# Patient Record
Sex: FEMALE | Race: WHITE | NOT HISPANIC OR LATINO | Employment: OTHER | URBAN - METROPOLITAN AREA
[De-identification: names, ages, dates, MRNs, and addresses within clinical notes are randomized per-mention and may not be internally consistent; named-entity substitution may affect disease eponyms.]

---

## 2020-12-21 ENCOUNTER — TELEPHONE (OUTPATIENT)
Dept: FAMILY MEDICINE | Facility: CLINIC | Age: 75
End: 2020-12-21

## 2020-12-21 NOTE — TELEPHONE ENCOUNTER
----- Message from Michaela Vasques sent at 12/21/2020  5:03 PM CST -----  Contact: self [phone]  Caller is requesting an earlier appt than we can schedule.  Caller declined first available appointment listed below. Caller will not accept being placed on the wait list and is requesting a message be sent to the provider.  When is the next available appointment:  2/9/2021  Reason for the appointment:  medication refill  Patient preference of timeframe to be scheduled: first week of January  Comments:

## 2021-01-04 ENCOUNTER — OFFICE VISIT (OUTPATIENT)
Dept: FAMILY MEDICINE | Facility: CLINIC | Age: 76
End: 2021-01-04
Payer: MEDICARE

## 2021-01-04 ENCOUNTER — TELEPHONE (OUTPATIENT)
Dept: FAMILY MEDICINE | Facility: CLINIC | Age: 76
End: 2021-01-04

## 2021-01-04 VITALS
TEMPERATURE: 95 F | OXYGEN SATURATION: 97 % | HEART RATE: 99 BPM | SYSTOLIC BLOOD PRESSURE: 130 MMHG | DIASTOLIC BLOOD PRESSURE: 72 MMHG | HEIGHT: 61 IN | BODY MASS INDEX: 33.68 KG/M2 | WEIGHT: 178.38 LBS

## 2021-01-04 DIAGNOSIS — Z12.11 SCREENING FOR COLON CANCER: ICD-10-CM

## 2021-01-04 DIAGNOSIS — G89.4 CHRONIC PAIN ASSOCIATED WITH SIGNIFICANT PSYCHOSOCIAL DYSFUNCTION: ICD-10-CM

## 2021-01-04 DIAGNOSIS — I10 HYPERTENSION, UNSPECIFIED TYPE: ICD-10-CM

## 2021-01-04 DIAGNOSIS — I10 ESSENTIAL HYPERTENSION: Primary | ICD-10-CM

## 2021-01-04 DIAGNOSIS — K21.9 GASTROESOPHAGEAL REFLUX DISEASE WITHOUT ESOPHAGITIS: ICD-10-CM

## 2021-01-04 DIAGNOSIS — K21.9 GASTROESOPHAGEAL REFLUX DISEASE, UNSPECIFIED WHETHER ESOPHAGITIS PRESENT: ICD-10-CM

## 2021-01-04 PROBLEM — M54.16 LUMBAR RADICULOPATHY: Status: ACTIVE | Noted: 2021-01-04

## 2021-01-04 PROBLEM — C53.9: Status: ACTIVE | Noted: 2018-01-11

## 2021-01-04 PROBLEM — F17.200 NICOTINE DEPENDENCE: Status: RESOLVED | Noted: 2018-01-11 | Resolved: 2021-01-04

## 2021-01-04 PROBLEM — L93.0 LUPUS ERYTHEMATOSUS: Status: ACTIVE | Noted: 2018-01-11

## 2021-01-04 PROBLEM — F17.200 NICOTINE DEPENDENCE: Status: ACTIVE | Noted: 2018-01-11

## 2021-01-04 PROBLEM — J41.1 MUCOPURULENT CHRONIC BRONCHITIS: Status: ACTIVE | Noted: 2021-01-04

## 2021-01-04 PROBLEM — E11.42 DIABETIC POLYNEUROPATHY: Status: ACTIVE | Noted: 2019-03-12

## 2021-01-04 PROBLEM — Z87.442 HISTORY OF RENAL CALCULI: Status: ACTIVE | Noted: 2021-01-04

## 2021-01-04 PROCEDURE — 99204 OFFICE O/P NEW MOD 45 MIN: CPT | Mod: S$GLB,,, | Performed by: STUDENT IN AN ORGANIZED HEALTH CARE EDUCATION/TRAINING PROGRAM

## 2021-01-04 PROCEDURE — 99204 PR OFFICE/OUTPT VISIT, NEW, LEVL IV, 45-59 MIN: ICD-10-PCS | Mod: S$GLB,,, | Performed by: STUDENT IN AN ORGANIZED HEALTH CARE EDUCATION/TRAINING PROGRAM

## 2021-01-04 RX ORDER — PROMETHAZINE HYDROCHLORIDE 25 MG/1
TABLET ORAL
COMMUNITY
Start: 2020-12-01 | End: 2021-07-19 | Stop reason: SDUPTHER

## 2021-01-04 RX ORDER — HYDROCODONE BITARTRATE AND ACETAMINOPHEN 10; 325 MG/1; MG/1
1 TABLET ORAL 4 TIMES DAILY
Qty: 120 TABLET | Refills: 0 | OUTPATIENT
Start: 2021-01-04

## 2021-01-04 RX ORDER — LOPERAMIDE HYDROCHLORIDE 2 MG/1
CAPSULE ORAL
Status: ON HOLD | COMMUNITY
End: 2021-09-10 | Stop reason: HOSPADM

## 2021-01-04 RX ORDER — INSULIN DETEMIR 100 [IU]/ML
INJECTION, SOLUTION SUBCUTANEOUS
COMMUNITY
Start: 2020-12-01 | End: 2021-06-16

## 2021-01-04 RX ORDER — DICYCLOMINE HYDROCHLORIDE 10 MG/1
CAPSULE ORAL
COMMUNITY
End: 2021-08-03 | Stop reason: SDUPTHER

## 2021-01-04 RX ORDER — PANTOPRAZOLE SODIUM 40 MG/1
TABLET, DELAYED RELEASE ORAL
Qty: 90 TABLET | Refills: 0 | Status: ON HOLD | OUTPATIENT
Start: 2021-01-04 | End: 2021-09-10 | Stop reason: HOSPADM

## 2021-01-04 RX ORDER — FLUTICASONE PROPIONATE 50 MCG
SPRAY, SUSPENSION (ML) NASAL
Status: ON HOLD | COMMUNITY
End: 2021-09-10 | Stop reason: HOSPADM

## 2021-01-04 RX ORDER — ATORVASTATIN CALCIUM 20 MG/1
TABLET, FILM COATED ORAL
COMMUNITY
Start: 2020-10-30 | End: 2021-05-28 | Stop reason: SDUPTHER

## 2021-01-04 RX ORDER — INSULIN ASPART 100 [IU]/ML
INJECTION, SUSPENSION SUBCUTANEOUS
COMMUNITY
End: 2021-06-16

## 2021-01-04 RX ORDER — CALCIUM CITRATE/VITAMIN D3 200MG-6.25
TABLET ORAL
COMMUNITY
Start: 2020-09-29 | End: 2021-02-09 | Stop reason: SDUPTHER

## 2021-01-04 RX ORDER — OXYCODONE AND ACETAMINOPHEN 7.5; 325 MG/1; MG/1
TABLET ORAL
COMMUNITY
End: 2021-02-09

## 2021-01-04 RX ORDER — PIOGLITAZONEHYDROCHLORIDE 15 MG/1
TABLET ORAL
COMMUNITY
End: 2021-01-04

## 2021-01-04 RX ORDER — CLONIDINE HYDROCHLORIDE 0.1 MG/1
0.1 TABLET ORAL 2 TIMES DAILY
Qty: 90 TABLET | Refills: 0 | Status: SHIPPED | OUTPATIENT
Start: 2021-01-04 | End: 2021-03-11

## 2021-01-04 RX ORDER — METHOCARBAMOL 750 MG/1
TABLET, FILM COATED ORAL
COMMUNITY
End: 2021-08-03 | Stop reason: SDUPTHER

## 2021-01-04 RX ORDER — AMLODIPINE BESYLATE 5 MG/1
TABLET ORAL
COMMUNITY
End: 2021-08-03 | Stop reason: SDUPTHER

## 2021-01-04 RX ORDER — PANTOPRAZOLE SODIUM 40 MG/1
TABLET, DELAYED RELEASE ORAL
COMMUNITY
End: 2021-01-04 | Stop reason: SDUPTHER

## 2021-01-04 RX ORDER — FUROSEMIDE 20 MG/1
TABLET ORAL
COMMUNITY
End: 2021-08-03 | Stop reason: SDUPTHER

## 2021-01-04 RX ORDER — IPRATROPIUM BROMIDE AND ALBUTEROL 20; 100 UG/1; UG/1
SPRAY, METERED RESPIRATORY (INHALATION)
COMMUNITY
End: 2021-02-10 | Stop reason: SDUPTHER

## 2021-01-06 RX ORDER — HYDROCODONE BITARTRATE AND ACETAMINOPHEN 10; 325 MG/1; MG/1
1 TABLET ORAL EVERY 6 HOURS PRN
Qty: 120 TABLET | Refills: 0 | OUTPATIENT
Start: 2021-01-06

## 2021-01-08 RX ORDER — HYDROCODONE BITARTRATE AND ACETAMINOPHEN 10; 325 MG/1; MG/1
1 TABLET ORAL 4 TIMES DAILY
Qty: 120 TABLET | Refills: 0 | Status: CANCELLED | OUTPATIENT
Start: 2021-01-08

## 2021-01-28 ENCOUNTER — TELEPHONE (OUTPATIENT)
Dept: FAMILY MEDICINE | Facility: CLINIC | Age: 76
End: 2021-01-28

## 2021-02-09 ENCOUNTER — OFFICE VISIT (OUTPATIENT)
Dept: FAMILY MEDICINE | Facility: CLINIC | Age: 76
End: 2021-02-09
Payer: MEDICARE

## 2021-02-09 DIAGNOSIS — L57.0 ACTINIC KERATOSIS: ICD-10-CM

## 2021-02-09 DIAGNOSIS — E11.42 DIABETIC POLYNEUROPATHY ASSOCIATED WITH TYPE 2 DIABETES MELLITUS: ICD-10-CM

## 2021-02-09 DIAGNOSIS — M51.36 DEGENERATION OF INTERVERTEBRAL DISC OF LUMBAR REGION: Primary | ICD-10-CM

## 2021-02-09 PROCEDURE — 99213 OFFICE O/P EST LOW 20 MIN: CPT | Mod: S$GLB,,, | Performed by: FAMILY MEDICINE

## 2021-02-09 PROCEDURE — 99213 PR OFFICE/OUTPT VISIT, EST, LEVL III, 20-29 MIN: ICD-10-PCS | Mod: S$GLB,,, | Performed by: FAMILY MEDICINE

## 2021-02-09 RX ORDER — HYDROCODONE BITARTRATE AND ACETAMINOPHEN 10; 325 MG/1; MG/1
1 TABLET ORAL 4 TIMES DAILY
Qty: 120 TABLET | Refills: 0 | Status: SHIPPED | OUTPATIENT
Start: 2021-02-09 | End: 2021-02-09

## 2021-02-09 RX ORDER — LIDOCAINE AND PRILOCAINE 25; 25 MG/G; MG/G
CREAM TOPICAL
Status: ON HOLD | COMMUNITY
End: 2021-09-10 | Stop reason: HOSPADM

## 2021-02-09 RX ORDER — MUPIROCIN 20 MG/G
OINTMENT TOPICAL 2 TIMES DAILY
Qty: 30 G | Refills: 2 | Status: ON HOLD | OUTPATIENT
Start: 2021-02-09 | End: 2021-09-10 | Stop reason: HOSPADM

## 2021-02-09 RX ORDER — MUPIROCIN 20 MG/G
OINTMENT TOPICAL
COMMUNITY
End: 2021-02-09 | Stop reason: SDUPTHER

## 2021-02-09 RX ORDER — CALCIUM CITRATE/VITAMIN D3 200MG-6.25
1 TABLET ORAL 4 TIMES DAILY
Qty: 124 STRIP | Refills: 5 | Status: ON HOLD | OUTPATIENT
Start: 2021-02-09 | End: 2021-09-10 | Stop reason: HOSPADM

## 2021-02-09 RX ORDER — PIOGLITAZONEHYDROCHLORIDE 15 MG/1
TABLET ORAL
COMMUNITY
Start: 2021-02-03 | End: 2021-06-16

## 2021-02-09 RX ORDER — HYDROCODONE BITARTRATE AND ACETAMINOPHEN 10; 325 MG/1; MG/1
1 TABLET ORAL 4 TIMES DAILY
Qty: 120 TABLET | Refills: 0 | Status: SHIPPED | OUTPATIENT
Start: 2021-02-09 | End: 2021-03-15 | Stop reason: SDUPTHER

## 2021-02-10 ENCOUNTER — PATIENT MESSAGE (OUTPATIENT)
Dept: RHEUMATOLOGY | Facility: CLINIC | Age: 76
End: 2021-02-10

## 2021-02-10 RX ORDER — IPRATROPIUM BROMIDE AND ALBUTEROL 20; 100 UG/1; UG/1
SPRAY, METERED RESPIRATORY (INHALATION)
Qty: 1 PACKAGE | Refills: 5 | Status: ON HOLD | OUTPATIENT
Start: 2021-02-10 | End: 2021-09-10 | Stop reason: HOSPADM

## 2021-02-11 DIAGNOSIS — E11.9 TYPE 2 DIABETES MELLITUS WITHOUT COMPLICATION: ICD-10-CM

## 2021-03-10 ENCOUNTER — TELEPHONE (OUTPATIENT)
Dept: PAIN MEDICINE | Facility: CLINIC | Age: 76
End: 2021-03-10

## 2021-03-11 ENCOUNTER — OFFICE VISIT (OUTPATIENT)
Dept: PAIN MEDICINE | Facility: CLINIC | Age: 76
End: 2021-03-11
Attending: ANESTHESIOLOGY
Payer: MEDICARE

## 2021-03-11 VITALS
RESPIRATION RATE: 18 BRPM | DIASTOLIC BLOOD PRESSURE: 79 MMHG | HEIGHT: 61 IN | TEMPERATURE: 99 F | SYSTOLIC BLOOD PRESSURE: 127 MMHG | WEIGHT: 183 LBS | HEART RATE: 113 BPM | BODY MASS INDEX: 34.55 KG/M2

## 2021-03-11 DIAGNOSIS — G89.4 CHRONIC PAIN SYNDROME: ICD-10-CM

## 2021-03-11 DIAGNOSIS — M54.15 RADICULOPATHY OF THORACOLUMBAR REGION: ICD-10-CM

## 2021-03-11 DIAGNOSIS — G89.4 CHRONIC PAIN ASSOCIATED WITH SIGNIFICANT PSYCHOSOCIAL DYSFUNCTION: ICD-10-CM

## 2021-03-11 DIAGNOSIS — E11.42 DIABETIC POLYNEUROPATHY ASSOCIATED WITH TYPE 2 DIABETES MELLITUS: Primary | ICD-10-CM

## 2021-03-11 DIAGNOSIS — I10 HYPERTENSION, UNSPECIFIED TYPE: ICD-10-CM

## 2021-03-11 DIAGNOSIS — M79.7 FIBROMYALGIA: ICD-10-CM

## 2021-03-11 DIAGNOSIS — M51.36 DDD (DEGENERATIVE DISC DISEASE), LUMBAR: ICD-10-CM

## 2021-03-11 PROCEDURE — 99215 OFFICE O/P EST HI 40 MIN: CPT | Mod: PBBFAC | Performed by: ANESTHESIOLOGY

## 2021-03-11 PROCEDURE — 99999 PR PBB SHADOW E&M-EST. PATIENT-LVL V: ICD-10-PCS | Mod: PBBFAC,,, | Performed by: ANESTHESIOLOGY

## 2021-03-11 PROCEDURE — 99204 PR OFFICE/OUTPT VISIT, NEW, LEVL IV, 45-59 MIN: ICD-10-PCS | Mod: S$PBB,GC,, | Performed by: ANESTHESIOLOGY

## 2021-03-11 PROCEDURE — 99999 PR PBB SHADOW E&M-EST. PATIENT-LVL V: CPT | Mod: PBBFAC,,, | Performed by: ANESTHESIOLOGY

## 2021-03-11 PROCEDURE — 99204 OFFICE O/P NEW MOD 45 MIN: CPT | Mod: S$PBB,GC,, | Performed by: ANESTHESIOLOGY

## 2021-03-11 RX ORDER — CLONIDINE HYDROCHLORIDE 0.1 MG/1
0.1 TABLET ORAL 2 TIMES DAILY
Qty: 90 TABLET | Refills: 0 | Status: SHIPPED | OUTPATIENT
Start: 2021-03-11 | End: 2021-05-27

## 2021-03-12 ENCOUNTER — TELEPHONE (OUTPATIENT)
Dept: PAIN MEDICINE | Facility: CLINIC | Age: 76
End: 2021-03-12

## 2021-03-15 ENCOUNTER — TELEPHONE (OUTPATIENT)
Dept: PAIN MEDICINE | Facility: CLINIC | Age: 76
End: 2021-03-15

## 2021-03-15 DIAGNOSIS — M51.36 DEGENERATION OF INTERVERTEBRAL DISC OF LUMBAR REGION: ICD-10-CM

## 2021-03-15 RX ORDER — HYDROCODONE BITARTRATE AND ACETAMINOPHEN 10; 325 MG/1; MG/1
1 TABLET ORAL EVERY 8 HOURS PRN
Qty: 90 TABLET | Refills: 0 | Status: SHIPPED | OUTPATIENT
Start: 2021-03-15 | End: 2021-04-15 | Stop reason: SDUPTHER

## 2021-03-21 PROBLEM — M54.16 LUMBAR RADICULOPATHY: Status: RESOLVED | Noted: 2021-01-04 | Resolved: 2021-03-21

## 2021-03-21 PROBLEM — M54.15 RADICULOPATHY OF THORACOLUMBAR REGION: Status: ACTIVE | Noted: 2021-03-21

## 2021-03-31 ENCOUNTER — TELEPHONE (OUTPATIENT)
Dept: PAIN MEDICINE | Facility: CLINIC | Age: 76
End: 2021-03-31

## 2021-04-05 ENCOUNTER — PATIENT MESSAGE (OUTPATIENT)
Dept: ADMINISTRATIVE | Facility: HOSPITAL | Age: 76
End: 2021-04-05

## 2021-04-07 ENCOUNTER — HOSPITAL ENCOUNTER (OUTPATIENT)
Dept: RADIOLOGY | Facility: OTHER | Age: 76
Discharge: HOME | End: 2021-04-07
Attending: ANESTHESIOLOGY
Payer: MEDICARE

## 2021-04-07 DIAGNOSIS — G89.4 CHRONIC PAIN SYNDROME: ICD-10-CM

## 2021-04-07 DIAGNOSIS — G89.4 CHRONIC PAIN ASSOCIATED WITH SIGNIFICANT PSYCHOSOCIAL DYSFUNCTION: ICD-10-CM

## 2021-04-07 DIAGNOSIS — M79.7 FIBROMYALGIA: ICD-10-CM

## 2021-04-07 DIAGNOSIS — E11.42 DIABETIC POLYNEUROPATHY ASSOCIATED WITH TYPE 2 DIABETES MELLITUS: ICD-10-CM

## 2021-04-07 PROCEDURE — 72110 X-RAY EXAM L-2 SPINE 4/>VWS: CPT | Mod: 26,,, | Performed by: RADIOLOGY

## 2021-04-07 PROCEDURE — 73521 X-RAY EXAM HIPS BI 2 VIEWS: CPT | Mod: TC,FY

## 2021-04-07 PROCEDURE — 72110 X-RAY EXAM L-2 SPINE 4/>VWS: CPT | Mod: TC,FY

## 2021-04-07 PROCEDURE — 73521 X-RAY EXAM HIPS BI 2 VIEWS: CPT | Mod: 26,,, | Performed by: RADIOLOGY

## 2021-04-07 PROCEDURE — 72110 XR LUMBAR SPINE 5 VIEW WITH FLEX AND EXT: ICD-10-PCS | Mod: 26,,, | Performed by: RADIOLOGY

## 2021-04-07 PROCEDURE — 73521 XR HIPS BILATERAL 2 VIEW INCL AP PELVIS: ICD-10-PCS | Mod: 26,,, | Performed by: RADIOLOGY

## 2021-04-12 ENCOUNTER — TELEPHONE (OUTPATIENT)
Dept: PAIN MEDICINE | Facility: CLINIC | Age: 76
End: 2021-04-12

## 2021-04-14 ENCOUNTER — TELEPHONE (OUTPATIENT)
Dept: FAMILY MEDICINE | Facility: CLINIC | Age: 76
End: 2021-04-14

## 2021-04-14 ENCOUNTER — TELEPHONE (OUTPATIENT)
Dept: PAIN MEDICINE | Facility: CLINIC | Age: 76
End: 2021-04-14

## 2021-04-15 ENCOUNTER — OFFICE VISIT (OUTPATIENT)
Dept: PAIN MEDICINE | Facility: CLINIC | Age: 76
End: 2021-04-15
Attending: ANESTHESIOLOGY
Payer: MEDICARE

## 2021-04-15 VITALS
RESPIRATION RATE: 18 BRPM | HEART RATE: 103 BPM | BODY MASS INDEX: 35.8 KG/M2 | TEMPERATURE: 99 F | SYSTOLIC BLOOD PRESSURE: 144 MMHG | DIASTOLIC BLOOD PRESSURE: 85 MMHG | WEIGHT: 189.63 LBS | HEIGHT: 61 IN

## 2021-04-15 DIAGNOSIS — M79.7 FIBROMYALGIA: ICD-10-CM

## 2021-04-15 DIAGNOSIS — M51.36 DDD (DEGENERATIVE DISC DISEASE), LUMBAR: ICD-10-CM

## 2021-04-15 DIAGNOSIS — M51.36 DEGENERATION OF INTERVERTEBRAL DISC OF LUMBAR REGION: ICD-10-CM

## 2021-04-15 DIAGNOSIS — G89.4 CHRONIC PAIN DISORDER: Primary | ICD-10-CM

## 2021-04-15 DIAGNOSIS — E11.42 DIABETIC POLYNEUROPATHY ASSOCIATED WITH TYPE 2 DIABETES MELLITUS: ICD-10-CM

## 2021-04-15 DIAGNOSIS — M54.16 LUMBAR RADICULOPATHY: ICD-10-CM

## 2021-04-15 PROCEDURE — 99214 PR OFFICE/OUTPT VISIT, EST, LEVL IV, 30-39 MIN: ICD-10-PCS | Mod: S$PBB,GC,, | Performed by: ANESTHESIOLOGY

## 2021-04-15 PROCEDURE — 99999 PR PBB SHADOW E&M-EST. PATIENT-LVL IV: CPT | Mod: PBBFAC,,, | Performed by: ANESTHESIOLOGY

## 2021-04-15 PROCEDURE — 99999 PR PBB SHADOW E&M-EST. PATIENT-LVL IV: ICD-10-PCS | Mod: PBBFAC,,, | Performed by: ANESTHESIOLOGY

## 2021-04-15 PROCEDURE — 99214 OFFICE O/P EST MOD 30 MIN: CPT | Mod: S$PBB,GC,, | Performed by: ANESTHESIOLOGY

## 2021-04-15 PROCEDURE — 99214 OFFICE O/P EST MOD 30 MIN: CPT | Mod: PBBFAC | Performed by: ANESTHESIOLOGY

## 2021-04-15 RX ORDER — PREGABALIN 150 MG/1
150 CAPSULE ORAL 3 TIMES DAILY
Qty: 90 CAPSULE | Refills: 2 | Status: SHIPPED | OUTPATIENT
Start: 2021-04-15 | End: 2021-08-03 | Stop reason: SDUPTHER

## 2021-04-15 RX ORDER — HYDROCODONE BITARTRATE AND ACETAMINOPHEN 10; 325 MG/1; MG/1
1 TABLET ORAL EVERY 8 HOURS PRN
Qty: 90 TABLET | Refills: 0 | Status: SHIPPED | OUTPATIENT
Start: 2021-04-15 | End: 2021-05-11 | Stop reason: SDUPTHER

## 2021-04-19 ENCOUNTER — TELEPHONE (OUTPATIENT)
Dept: ADMINISTRATIVE | Facility: OTHER | Age: 76
End: 2021-04-19

## 2021-04-20 RX ORDER — PREGABALIN 150 MG/1
150 CAPSULE ORAL 3 TIMES DAILY
OUTPATIENT
Start: 2021-04-20

## 2021-04-21 ENCOUNTER — TELEPHONE (OUTPATIENT)
Dept: FAMILY MEDICINE | Facility: CLINIC | Age: 76
End: 2021-04-21

## 2021-05-04 ENCOUNTER — PATIENT MESSAGE (OUTPATIENT)
Dept: PAIN MEDICINE | Facility: OTHER | Age: 76
End: 2021-05-04

## 2021-05-05 ENCOUNTER — HOSPITAL ENCOUNTER (OUTPATIENT)
Facility: OTHER | Age: 76
Discharge: HOME | End: 2021-05-05
Attending: ANESTHESIOLOGY | Admitting: ANESTHESIOLOGY
Payer: MEDICARE

## 2021-05-05 VITALS
HEART RATE: 102 BPM | BODY MASS INDEX: 31.24 KG/M2 | HEIGHT: 64 IN | SYSTOLIC BLOOD PRESSURE: 129 MMHG | DIASTOLIC BLOOD PRESSURE: 71 MMHG | OXYGEN SATURATION: 95 % | WEIGHT: 183 LBS | RESPIRATION RATE: 16 BRPM | TEMPERATURE: 98 F

## 2021-05-05 DIAGNOSIS — M79.2 NEUROPATHIC PAIN: Primary | ICD-10-CM

## 2021-05-05 LAB — POCT GLUCOSE: 194 MG/DL (ref 70–110)

## 2021-05-05 PROCEDURE — 64520 PR INJECT NERV BLCK,PARAVERT SYMPATH: ICD-10-PCS | Mod: 50,,, | Performed by: ANESTHESIOLOGY

## 2021-05-05 PROCEDURE — 25000003 PHARM REV CODE 250: Performed by: STUDENT IN AN ORGANIZED HEALTH CARE EDUCATION/TRAINING PROGRAM

## 2021-05-05 PROCEDURE — 25000003 PHARM REV CODE 250: Performed by: ANESTHESIOLOGY

## 2021-05-05 PROCEDURE — 25500020 PHARM REV CODE 255: Performed by: ANESTHESIOLOGY

## 2021-05-05 PROCEDURE — 64520 N BLOCK LUMBAR/THORACIC: CPT | Mod: 50,,, | Performed by: ANESTHESIOLOGY

## 2021-05-05 PROCEDURE — 63600175 PHARM REV CODE 636 W HCPCS: Performed by: ANESTHESIOLOGY

## 2021-05-05 PROCEDURE — 64520 N BLOCK LUMBAR/THORACIC: CPT | Mod: 50 | Performed by: ANESTHESIOLOGY

## 2021-05-05 RX ORDER — BUPIVACAINE HYDROCHLORIDE 2.5 MG/ML
INJECTION, SOLUTION EPIDURAL; INFILTRATION; INTRACAUDAL
Status: DISCONTINUED | OUTPATIENT
Start: 2021-05-05 | End: 2021-05-05 | Stop reason: HOSPADM

## 2021-05-05 RX ORDER — DEXAMETHASONE SODIUM PHOSPHATE 4 MG/ML
INJECTION, SOLUTION INTRA-ARTICULAR; INTRALESIONAL; INTRAMUSCULAR; INTRAVENOUS; SOFT TISSUE
Status: DISCONTINUED | OUTPATIENT
Start: 2021-05-05 | End: 2021-05-05 | Stop reason: HOSPADM

## 2021-05-05 RX ORDER — SODIUM CHLORIDE 9 MG/ML
INJECTION, SOLUTION INTRAVENOUS CONTINUOUS
Status: DISCONTINUED | OUTPATIENT
Start: 2021-05-05 | End: 2021-05-05 | Stop reason: HOSPADM

## 2021-05-05 RX ORDER — MIDAZOLAM HYDROCHLORIDE 1 MG/ML
INJECTION INTRAMUSCULAR; INTRAVENOUS
Status: DISCONTINUED | OUTPATIENT
Start: 2021-05-05 | End: 2021-05-05 | Stop reason: HOSPADM

## 2021-05-05 RX ORDER — LIDOCAINE HYDROCHLORIDE 10 MG/ML
INJECTION INFILTRATION; PERINEURAL
Status: DISCONTINUED | OUTPATIENT
Start: 2021-05-05 | End: 2021-05-05 | Stop reason: HOSPADM

## 2021-05-05 RX ORDER — FENTANYL CITRATE 50 UG/ML
INJECTION, SOLUTION INTRAMUSCULAR; INTRAVENOUS
Status: DISCONTINUED | OUTPATIENT
Start: 2021-05-05 | End: 2021-05-05 | Stop reason: HOSPADM

## 2021-05-10 ENCOUNTER — TELEPHONE (OUTPATIENT)
Dept: PAIN MEDICINE | Facility: CLINIC | Age: 76
End: 2021-05-10

## 2021-05-11 DIAGNOSIS — M51.36 DEGENERATION OF INTERVERTEBRAL DISC OF LUMBAR REGION: ICD-10-CM

## 2021-05-12 RX ORDER — HYDROCODONE BITARTRATE AND ACETAMINOPHEN 10; 325 MG/1; MG/1
1 TABLET ORAL EVERY 8 HOURS PRN
Qty: 90 TABLET | Refills: 0 | Status: SHIPPED | OUTPATIENT
Start: 2021-05-14 | End: 2021-05-14 | Stop reason: SDUPTHER

## 2021-05-13 DIAGNOSIS — K21.9 GASTROESOPHAGEAL REFLUX DISEASE, UNSPECIFIED WHETHER ESOPHAGITIS PRESENT: Primary | ICD-10-CM

## 2021-05-13 DIAGNOSIS — I10 HYPERTENSION, UNSPECIFIED TYPE: ICD-10-CM

## 2021-05-14 ENCOUNTER — TELEPHONE (OUTPATIENT)
Dept: PAIN MEDICINE | Facility: CLINIC | Age: 76
End: 2021-05-14

## 2021-05-14 DIAGNOSIS — M51.36 DEGENERATION OF INTERVERTEBRAL DISC OF LUMBAR REGION: ICD-10-CM

## 2021-05-17 RX ORDER — HYDROCODONE BITARTRATE AND ACETAMINOPHEN 10; 325 MG/1; MG/1
1 TABLET ORAL EVERY 8 HOURS PRN
Qty: 90 TABLET | Refills: 0 | Status: SHIPPED | OUTPATIENT
Start: 2021-05-17 | End: 2021-06-10 | Stop reason: SDUPTHER

## 2021-05-26 RX ORDER — CLONIDINE HYDROCHLORIDE 0.1 MG/1
0.1 TABLET ORAL 2 TIMES DAILY
Qty: 90 TABLET | Refills: 0 | Status: CANCELLED | OUTPATIENT
Start: 2021-05-26

## 2021-05-27 ENCOUNTER — PATIENT OUTREACH (OUTPATIENT)
Dept: ADMINISTRATIVE | Facility: OTHER | Age: 76
End: 2021-05-27

## 2021-05-27 ENCOUNTER — TELEPHONE (OUTPATIENT)
Dept: INTERNAL MEDICINE | Facility: CLINIC | Age: 76
End: 2021-05-27

## 2021-05-27 ENCOUNTER — TELEPHONE (OUTPATIENT)
Dept: ADMINISTRATIVE | Facility: OTHER | Age: 76
End: 2021-05-27

## 2021-05-27 ENCOUNTER — LAB VISIT (OUTPATIENT)
Dept: LAB | Facility: OTHER | Age: 76
End: 2021-05-27
Attending: STUDENT IN AN ORGANIZED HEALTH CARE EDUCATION/TRAINING PROGRAM
Payer: MEDICARE

## 2021-05-27 ENCOUNTER — OFFICE VISIT (OUTPATIENT)
Dept: INTERNAL MEDICINE | Facility: CLINIC | Age: 76
End: 2021-05-27
Attending: ANESTHESIOLOGY
Payer: MEDICARE

## 2021-05-27 VITALS
OXYGEN SATURATION: 95 % | BODY MASS INDEX: 31.96 KG/M2 | HEIGHT: 64 IN | SYSTOLIC BLOOD PRESSURE: 146 MMHG | WEIGHT: 187.19 LBS | HEART RATE: 93 BPM | DIASTOLIC BLOOD PRESSURE: 78 MMHG

## 2021-05-27 DIAGNOSIS — G89.4 CHRONIC PAIN SYNDROME: ICD-10-CM

## 2021-05-27 DIAGNOSIS — R53.83 FATIGUE, UNSPECIFIED TYPE: ICD-10-CM

## 2021-05-27 DIAGNOSIS — I10 ESSENTIAL HYPERTENSION: Primary | ICD-10-CM

## 2021-05-27 DIAGNOSIS — Z11.4 SCREENING FOR HIV (HUMAN IMMUNODEFICIENCY VIRUS): ICD-10-CM

## 2021-05-27 DIAGNOSIS — L93.0 LUPUS ERYTHEMATOSUS, UNSPECIFIED FORM: ICD-10-CM

## 2021-05-27 DIAGNOSIS — I10 HYPERTENSION, UNSPECIFIED TYPE: ICD-10-CM

## 2021-05-27 DIAGNOSIS — Z13.6 ENCOUNTER FOR LIPID SCREENING FOR CARDIOVASCULAR DISEASE: ICD-10-CM

## 2021-05-27 DIAGNOSIS — M79.7 FIBROMYALGIA: ICD-10-CM

## 2021-05-27 DIAGNOSIS — Z00.00 PREVENTATIVE HEALTH CARE: ICD-10-CM

## 2021-05-27 DIAGNOSIS — C53.9: ICD-10-CM

## 2021-05-27 DIAGNOSIS — G89.4 CHRONIC PAIN ASSOCIATED WITH SIGNIFICANT PSYCHOSOCIAL DYSFUNCTION: ICD-10-CM

## 2021-05-27 DIAGNOSIS — E11.59 TYPE 2 DIABETES MELLITUS WITH OTHER CIRCULATORY COMPLICATION, WITHOUT LONG-TERM CURRENT USE OF INSULIN: ICD-10-CM

## 2021-05-27 DIAGNOSIS — Z79.4 TYPE 2 DIABETES MELLITUS WITH OTHER CIRCULATORY COMPLICATION, WITH LONG-TERM CURRENT USE OF INSULIN: Primary | ICD-10-CM

## 2021-05-27 DIAGNOSIS — Z11.59 NEED FOR HEPATITIS C SCREENING TEST: ICD-10-CM

## 2021-05-27 DIAGNOSIS — R07.89 OTHER CHEST PAIN: ICD-10-CM

## 2021-05-27 DIAGNOSIS — E11.42 DIABETIC POLYNEUROPATHY ASSOCIATED WITH TYPE 2 DIABETES MELLITUS: ICD-10-CM

## 2021-05-27 DIAGNOSIS — E78.5 HYPERLIPIDEMIA, UNSPECIFIED HYPERLIPIDEMIA TYPE: ICD-10-CM

## 2021-05-27 DIAGNOSIS — Z13.220 ENCOUNTER FOR LIPID SCREENING FOR CARDIOVASCULAR DISEASE: ICD-10-CM

## 2021-05-27 DIAGNOSIS — I25.10 ATHEROSCLEROSIS OF NATIVE CORONARY ARTERY OF NATIVE HEART WITHOUT ANGINA PECTORIS: ICD-10-CM

## 2021-05-27 DIAGNOSIS — J41.1 MUCOPURULENT CHRONIC BRONCHITIS: ICD-10-CM

## 2021-05-27 DIAGNOSIS — E11.59 TYPE 2 DIABETES MELLITUS WITH OTHER CIRCULATORY COMPLICATION, WITH LONG-TERM CURRENT USE OF INSULIN: Primary | ICD-10-CM

## 2021-05-27 LAB
ALBUMIN SERPL BCP-MCNC: 3.2 G/DL (ref 3.5–5.2)
ALP SERPL-CCNC: 109 U/L (ref 55–135)
ALT SERPL W/O P-5'-P-CCNC: 10 U/L (ref 10–44)
ANION GAP SERPL CALC-SCNC: 14 MMOL/L (ref 8–16)
AST SERPL-CCNC: 11 U/L (ref 10–40)
BILIRUB SERPL-MCNC: 0.3 MG/DL (ref 0.1–1)
BUN SERPL-MCNC: 13 MG/DL (ref 8–23)
CALCIUM SERPL-MCNC: 8.8 MG/DL (ref 8.7–10.5)
CHLORIDE SERPL-SCNC: 98 MMOL/L (ref 95–110)
CO2 SERPL-SCNC: 23 MMOL/L (ref 23–29)
CREAT SERPL-MCNC: 1 MG/DL (ref 0.5–1.4)
EST. GFR  (AFRICAN AMERICAN): >60 ML/MIN/1.73 M^2
EST. GFR  (NON AFRICAN AMERICAN): 55 ML/MIN/1.73 M^2
ESTIMATED AVG GLUCOSE: 280 MG/DL (ref 68–131)
GLUCOSE SERPL-MCNC: 584 MG/DL (ref 70–110)
HBA1C MFR BLD: 11.4 % (ref 4–5.6)
POTASSIUM SERPL-SCNC: 4.4 MMOL/L (ref 3.5–5.1)
PROT SERPL-MCNC: 6.9 G/DL (ref 6–8.4)
SODIUM SERPL-SCNC: 135 MMOL/L (ref 136–145)
TSH SERPL DL<=0.005 MIU/L-ACNC: 2.27 UIU/ML (ref 0.4–4)

## 2021-05-27 PROCEDURE — 99999 PR PBB SHADOW E&M-EST. PATIENT-LVL V: CPT | Mod: PBBFAC,,, | Performed by: STUDENT IN AN ORGANIZED HEALTH CARE EDUCATION/TRAINING PROGRAM

## 2021-05-27 PROCEDURE — 86703 HIV-1/HIV-2 1 RESULT ANTBDY: CPT | Performed by: STUDENT IN AN ORGANIZED HEALTH CARE EDUCATION/TRAINING PROGRAM

## 2021-05-27 PROCEDURE — 99214 PR OFFICE/OUTPT VISIT, EST, LEVL IV, 30-39 MIN: ICD-10-PCS | Mod: S$PBB,,, | Performed by: STUDENT IN AN ORGANIZED HEALTH CARE EDUCATION/TRAINING PROGRAM

## 2021-05-27 PROCEDURE — 99215 OFFICE O/P EST HI 40 MIN: CPT | Mod: PBBFAC | Performed by: STUDENT IN AN ORGANIZED HEALTH CARE EDUCATION/TRAINING PROGRAM

## 2021-05-27 PROCEDURE — 86803 HEPATITIS C AB TEST: CPT | Performed by: STUDENT IN AN ORGANIZED HEALTH CARE EDUCATION/TRAINING PROGRAM

## 2021-05-27 PROCEDURE — 83036 HEMOGLOBIN GLYCOSYLATED A1C: CPT | Performed by: STUDENT IN AN ORGANIZED HEALTH CARE EDUCATION/TRAINING PROGRAM

## 2021-05-27 PROCEDURE — 99999 PR PBB SHADOW E&M-EST. PATIENT-LVL V: ICD-10-PCS | Mod: PBBFAC,,, | Performed by: STUDENT IN AN ORGANIZED HEALTH CARE EDUCATION/TRAINING PROGRAM

## 2021-05-27 PROCEDURE — 80053 COMPREHEN METABOLIC PANEL: CPT | Performed by: STUDENT IN AN ORGANIZED HEALTH CARE EDUCATION/TRAINING PROGRAM

## 2021-05-27 PROCEDURE — 84443 ASSAY THYROID STIM HORMONE: CPT | Performed by: STUDENT IN AN ORGANIZED HEALTH CARE EDUCATION/TRAINING PROGRAM

## 2021-05-27 PROCEDURE — 99214 OFFICE O/P EST MOD 30 MIN: CPT | Mod: S$PBB,,, | Performed by: STUDENT IN AN ORGANIZED HEALTH CARE EDUCATION/TRAINING PROGRAM

## 2021-05-27 PROCEDURE — 80061 LIPID PANEL: CPT | Performed by: STUDENT IN AN ORGANIZED HEALTH CARE EDUCATION/TRAINING PROGRAM

## 2021-05-27 PROCEDURE — 36415 COLL VENOUS BLD VENIPUNCTURE: CPT | Performed by: STUDENT IN AN ORGANIZED HEALTH CARE EDUCATION/TRAINING PROGRAM

## 2021-05-27 RX ORDER — CLONIDINE HYDROCHLORIDE 0.1 MG/1
0.1 TABLET ORAL 2 TIMES DAILY
Qty: 60 TABLET | Refills: 1 | Status: SHIPPED | OUTPATIENT
Start: 2021-05-27 | End: 2021-08-03 | Stop reason: SDUPTHER

## 2021-05-28 DIAGNOSIS — E78.5 HYPERLIPIDEMIA, UNSPECIFIED HYPERLIPIDEMIA TYPE: Primary | ICD-10-CM

## 2021-05-28 LAB
CHOLEST SERPL-MCNC: 286 MG/DL (ref 120–199)
CHOLEST/HDLC SERPL: 6.5 {RATIO} (ref 2–5)
HCV AB SERPL QL IA: NEGATIVE
HDLC SERPL-MCNC: 44 MG/DL (ref 40–75)
HDLC SERPL: 15.4 % (ref 20–50)
HIV 1+2 AB+HIV1 P24 AG SERPL QL IA: NEGATIVE
LDLC SERPL CALC-MCNC: 185.8 MG/DL (ref 63–159)
NONHDLC SERPL-MCNC: 242 MG/DL
TRIGL SERPL-MCNC: 281 MG/DL (ref 30–150)

## 2021-05-28 RX ORDER — ATORVASTATIN CALCIUM 20 MG/1
40 TABLET, FILM COATED ORAL DAILY
Qty: 90 TABLET | Refills: 1 | Status: SHIPPED | OUTPATIENT
Start: 2021-05-28 | End: 2021-08-03 | Stop reason: SDUPTHER

## 2021-05-31 ENCOUNTER — TELEPHONE (OUTPATIENT)
Dept: RHEUMATOLOGY | Facility: CLINIC | Age: 76
End: 2021-05-31

## 2021-06-01 ENCOUNTER — TELEPHONE (OUTPATIENT)
Dept: PAIN MEDICINE | Facility: CLINIC | Age: 76
End: 2021-06-01

## 2021-06-02 ENCOUNTER — CLINICAL SUPPORT (OUTPATIENT)
Dept: DIABETES | Facility: CLINIC | Age: 76
End: 2021-06-02
Payer: MEDICARE

## 2021-06-02 VITALS — WEIGHT: 187.81 LBS | BODY MASS INDEX: 32.24 KG/M2

## 2021-06-02 DIAGNOSIS — Z79.4 TYPE 2 DIABETES MELLITUS WITH OTHER CIRCULATORY COMPLICATION, WITH LONG-TERM CURRENT USE OF INSULIN: ICD-10-CM

## 2021-06-02 DIAGNOSIS — E11.59 TYPE 2 DIABETES MELLITUS WITH OTHER CIRCULATORY COMPLICATION, WITH LONG-TERM CURRENT USE OF INSULIN: ICD-10-CM

## 2021-06-02 PROCEDURE — G0108 DIAB MANAGE TRN  PER INDIV: HCPCS | Mod: PBBFAC | Performed by: DIETITIAN, REGISTERED

## 2021-06-02 PROCEDURE — 99999 PR PBB SHADOW E&M-EST. PATIENT-LVL I: ICD-10-PCS | Mod: PBBFAC,,, | Performed by: DIETITIAN, REGISTERED

## 2021-06-02 PROCEDURE — 99999 PR PBB SHADOW E&M-EST. PATIENT-LVL I: CPT | Mod: PBBFAC,,, | Performed by: DIETITIAN, REGISTERED

## 2021-06-02 PROCEDURE — 99211 OFF/OP EST MAY X REQ PHY/QHP: CPT | Mod: PBBFAC | Performed by: DIETITIAN, REGISTERED

## 2021-06-07 ENCOUNTER — OFFICE VISIT (OUTPATIENT)
Dept: PODIATRY | Facility: CLINIC | Age: 76
End: 2021-06-07
Payer: MEDICARE

## 2021-06-07 VITALS
HEIGHT: 64 IN | HEART RATE: 98 BPM | BODY MASS INDEX: 31.92 KG/M2 | DIASTOLIC BLOOD PRESSURE: 79 MMHG | WEIGHT: 187 LBS | SYSTOLIC BLOOD PRESSURE: 173 MMHG

## 2021-06-07 DIAGNOSIS — E11.42 DIABETIC POLYNEUROPATHY ASSOCIATED WITH TYPE 2 DIABETES MELLITUS: ICD-10-CM

## 2021-06-07 DIAGNOSIS — M79.2 NEUROPATHIC PAIN: Primary | ICD-10-CM

## 2021-06-07 DIAGNOSIS — E11.59 TYPE 2 DIABETES MELLITUS WITH OTHER CIRCULATORY COMPLICATION, WITHOUT LONG-TERM CURRENT USE OF INSULIN: ICD-10-CM

## 2021-06-07 DIAGNOSIS — M79.7 FIBROMYALGIA: ICD-10-CM

## 2021-06-07 PROCEDURE — 99999 PR PBB SHADOW E&M-EST. PATIENT-LVL V: CPT | Mod: PBBFAC,,, | Performed by: PODIATRIST

## 2021-06-07 PROCEDURE — 99203 PR OFFICE/OUTPT VISIT, NEW, LEVL III, 30-44 MIN: ICD-10-PCS | Mod: S$PBB,,, | Performed by: PODIATRIST

## 2021-06-07 PROCEDURE — 99203 OFFICE O/P NEW LOW 30 MIN: CPT | Mod: S$PBB,,, | Performed by: PODIATRIST

## 2021-06-07 PROCEDURE — 99999 PR PBB SHADOW E&M-EST. PATIENT-LVL V: ICD-10-PCS | Mod: PBBFAC,,, | Performed by: PODIATRIST

## 2021-06-07 PROCEDURE — 99215 OFFICE O/P EST HI 40 MIN: CPT | Mod: PBBFAC,PN | Performed by: PODIATRIST

## 2021-06-07 RX ORDER — PROMETHAZINE HYDROCHLORIDE 25 MG/1
TABLET ORAL
OUTPATIENT
Start: 2021-06-07

## 2021-06-07 RX ORDER — ALCOHOL 2.38 KG/3.79L
1 GEL TOPICAL DAILY
Qty: 30 CAPSULE | Refills: 3 | Status: ON HOLD | OUTPATIENT
Start: 2021-06-07 | End: 2022-05-04

## 2021-06-09 ENCOUNTER — PATIENT OUTREACH (OUTPATIENT)
Dept: ADMINISTRATIVE | Facility: HOSPITAL | Age: 76
End: 2021-06-09

## 2021-06-09 ENCOUNTER — HOSPITAL ENCOUNTER (OUTPATIENT)
Dept: RADIOLOGY | Facility: OTHER | Age: 76
Discharge: HOME | End: 2021-06-09
Attending: STUDENT IN AN ORGANIZED HEALTH CARE EDUCATION/TRAINING PROGRAM
Payer: MEDICARE

## 2021-06-09 ENCOUNTER — TELEPHONE (OUTPATIENT)
Dept: OBSTETRICS AND GYNECOLOGY | Facility: CLINIC | Age: 76
End: 2021-06-09

## 2021-06-09 ENCOUNTER — HOSPITAL ENCOUNTER (OUTPATIENT)
Dept: CARDIOLOGY | Facility: OTHER | Age: 76
Discharge: HOME | End: 2021-06-09
Attending: STUDENT IN AN ORGANIZED HEALTH CARE EDUCATION/TRAINING PROGRAM
Payer: MEDICARE

## 2021-06-09 ENCOUNTER — TELEPHONE (OUTPATIENT)
Dept: PAIN MEDICINE | Facility: CLINIC | Age: 76
End: 2021-06-09

## 2021-06-09 ENCOUNTER — PATIENT MESSAGE (OUTPATIENT)
Dept: ADMINISTRATIVE | Facility: HOSPITAL | Age: 76
End: 2021-06-09

## 2021-06-09 VITALS
HEIGHT: 64 IN | HEART RATE: 84 BPM | WEIGHT: 187 LBS | BODY MASS INDEX: 31.92 KG/M2 | SYSTOLIC BLOOD PRESSURE: 171 MMHG | DIASTOLIC BLOOD PRESSURE: 85 MMHG

## 2021-06-09 DIAGNOSIS — R07.89 OTHER CHEST PAIN: ICD-10-CM

## 2021-06-09 DIAGNOSIS — I25.10 ATHEROSCLEROSIS OF NATIVE CORONARY ARTERY OF NATIVE HEART WITHOUT ANGINA PECTORIS: ICD-10-CM

## 2021-06-09 DIAGNOSIS — Z78.0 POSTMENOPAUSAL: Primary | ICD-10-CM

## 2021-06-09 LAB
CV STRESS BASE HR: 84 BPM
DIASTOLIC BLOOD PRESSURE: 85 MMHG
OHS CV CPX 85 PERCENT MAX PREDICTED HEART RATE MALE: 119
OHS CV CPX MAX PREDICTED HEART RATE: 140
OHS CV CPX PATIENT IS FEMALE: 1
OHS CV CPX PATIENT IS MALE: 0
OHS CV CPX PEAK DIASTOLIC BLOOD PRESSURE: 59 MMHG
OHS CV CPX PEAK HEAR RATE: 98 BPM
OHS CV CPX PEAK RATE PRESSURE PRODUCT: NORMAL
OHS CV CPX PEAK SYSTOLIC BLOOD PRESSURE: 153 MMHG
OHS CV CPX PERCENT MAX PREDICTED HEART RATE ACHIEVED: 70
OHS CV CPX RATE PRESSURE PRODUCT PRESENTING: NORMAL
SYSTOLIC BLOOD PRESSURE: 171 MMHG

## 2021-06-09 PROCEDURE — 78452 NM MYOCARDIAL PERFUSION SPECT MULTI PHARM: ICD-10-PCS | Mod: 26,,, | Performed by: RADIOLOGY

## 2021-06-09 PROCEDURE — 78452 HT MUSCLE IMAGE SPECT MULT: CPT | Mod: 26,,, | Performed by: RADIOLOGY

## 2021-06-09 PROCEDURE — 63600175 PHARM REV CODE 636 W HCPCS: Performed by: STUDENT IN AN ORGANIZED HEALTH CARE EDUCATION/TRAINING PROGRAM

## 2021-06-09 PROCEDURE — 93018 NUCLEAR STRESS TEST (CUPID ONLY): ICD-10-PCS | Mod: ,,, | Performed by: INTERNAL MEDICINE

## 2021-06-09 PROCEDURE — 93018 CV STRESS TEST I&R ONLY: CPT | Mod: ,,, | Performed by: INTERNAL MEDICINE

## 2021-06-09 PROCEDURE — 78452 HT MUSCLE IMAGE SPECT MULT: CPT | Mod: TC

## 2021-06-09 PROCEDURE — A9500 TC99M SESTAMIBI: HCPCS

## 2021-06-09 PROCEDURE — 93016 NUCLEAR STRESS TEST (CUPID ONLY): ICD-10-PCS | Mod: ,,, | Performed by: INTERNAL MEDICINE

## 2021-06-09 PROCEDURE — 93017 CV STRESS TEST TRACING ONLY: CPT

## 2021-06-09 PROCEDURE — 93016 CV STRESS TEST SUPVJ ONLY: CPT | Mod: ,,, | Performed by: INTERNAL MEDICINE

## 2021-06-09 RX ORDER — REGADENOSON 0.08 MG/ML
0.4 INJECTION, SOLUTION INTRAVENOUS ONCE
Status: COMPLETED | OUTPATIENT
Start: 2021-06-09 | End: 2021-06-09

## 2021-06-09 RX ADMIN — REGADENOSON 0.4 MG: 0.08 INJECTION, SOLUTION INTRAVENOUS at 10:06

## 2021-06-10 ENCOUNTER — OFFICE VISIT (OUTPATIENT)
Dept: PAIN MEDICINE | Facility: CLINIC | Age: 76
End: 2021-06-10
Attending: ANESTHESIOLOGY
Payer: MEDICARE

## 2021-06-10 VITALS
WEIGHT: 186.94 LBS | HEART RATE: 107 BPM | RESPIRATION RATE: 18 BRPM | SYSTOLIC BLOOD PRESSURE: 158 MMHG | DIASTOLIC BLOOD PRESSURE: 77 MMHG | HEIGHT: 64 IN | BODY MASS INDEX: 31.91 KG/M2

## 2021-06-10 DIAGNOSIS — M79.2 NEUROPATHIC PAIN: ICD-10-CM

## 2021-06-10 DIAGNOSIS — M51.36 DEGENERATION OF INTERVERTEBRAL DISC OF LUMBAR REGION: ICD-10-CM

## 2021-06-10 DIAGNOSIS — M79.2 NEURALGIA AND NEURITIS: ICD-10-CM

## 2021-06-10 DIAGNOSIS — E11.42 DIABETIC POLYNEUROPATHY ASSOCIATED WITH TYPE 2 DIABETES MELLITUS: Primary | ICD-10-CM

## 2021-06-10 DIAGNOSIS — I10 HYPERTENSION, UNSPECIFIED TYPE: ICD-10-CM

## 2021-06-10 DIAGNOSIS — G89.4 CHRONIC PAIN SYNDROME: ICD-10-CM

## 2021-06-10 PROCEDURE — 99214 PR OFFICE/OUTPT VISIT, EST, LEVL IV, 30-39 MIN: ICD-10-PCS | Mod: S$PBB,GC,, | Performed by: ANESTHESIOLOGY

## 2021-06-10 PROCEDURE — 99999 PR PBB SHADOW E&M-EST. PATIENT-LVL IV: CPT | Mod: PBBFAC,,, | Performed by: ANESTHESIOLOGY

## 2021-06-10 PROCEDURE — 99999 PR PBB SHADOW E&M-EST. PATIENT-LVL IV: ICD-10-PCS | Mod: PBBFAC,,, | Performed by: ANESTHESIOLOGY

## 2021-06-10 PROCEDURE — 99214 OFFICE O/P EST MOD 30 MIN: CPT | Mod: PBBFAC | Performed by: ANESTHESIOLOGY

## 2021-06-10 PROCEDURE — 99214 OFFICE O/P EST MOD 30 MIN: CPT | Mod: S$PBB,GC,, | Performed by: ANESTHESIOLOGY

## 2021-06-10 RX ORDER — HYDROCODONE BITARTRATE AND ACETAMINOPHEN 10; 325 MG/1; MG/1
1 TABLET ORAL EVERY 8 HOURS PRN
Qty: 90 TABLET | Refills: 0 | Status: SHIPPED | OUTPATIENT
Start: 2021-06-15 | End: 2021-07-09 | Stop reason: SDUPTHER

## 2021-06-10 RX ORDER — CLONIDINE HYDROCHLORIDE 0.1 MG/1
0.1 TABLET ORAL 2 TIMES DAILY
Qty: 60 TABLET | Refills: 1 | OUTPATIENT
Start: 2021-06-10

## 2021-06-10 RX ORDER — PIOGLITAZONEHYDROCHLORIDE 15 MG/1
15 TABLET ORAL DAILY
Qty: 30 TABLET | Refills: 5 | OUTPATIENT
Start: 2021-06-10

## 2021-06-11 ENCOUNTER — OFFICE VISIT (OUTPATIENT)
Dept: CARDIOLOGY | Facility: CLINIC | Age: 76
End: 2021-06-11
Attending: INTERNAL MEDICINE
Payer: MEDICARE

## 2021-06-11 VITALS
SYSTOLIC BLOOD PRESSURE: 138 MMHG | DIASTOLIC BLOOD PRESSURE: 70 MMHG | HEART RATE: 102 BPM | WEIGHT: 188.5 LBS | HEIGHT: 64 IN | BODY MASS INDEX: 32.18 KG/M2

## 2021-06-11 DIAGNOSIS — E78.1 HYPERTRIGLYCERIDEMIA: ICD-10-CM

## 2021-06-11 DIAGNOSIS — I25.10 ATHEROSCLEROSIS OF NATIVE CORONARY ARTERY OF NATIVE HEART WITHOUT ANGINA PECTORIS: ICD-10-CM

## 2021-06-11 DIAGNOSIS — I10 ESSENTIAL HYPERTENSION: ICD-10-CM

## 2021-06-11 DIAGNOSIS — I50.32 HEART FAILURE, DIASTOLIC, CHRONIC: ICD-10-CM

## 2021-06-11 DIAGNOSIS — E78.01 FAMILIAL HYPERCHOLESTEROLEMIA: ICD-10-CM

## 2021-06-11 DIAGNOSIS — N18.31 STAGE 3A CHRONIC KIDNEY DISEASE: ICD-10-CM

## 2021-06-11 DIAGNOSIS — E66.9 MILD OBESITY: ICD-10-CM

## 2021-06-11 DIAGNOSIS — R07.2 PRECORDIAL PAIN: ICD-10-CM

## 2021-06-11 DIAGNOSIS — M32.19 SYSTEMIC LUPUS ERYTHEMATOSUS WITH OTHER ORGAN INVOLVEMENT, UNSPECIFIED SLE TYPE: ICD-10-CM

## 2021-06-11 DIAGNOSIS — E11.59 TYPE 2 DIABETES MELLITUS WITH OTHER CIRCULATORY COMPLICATION, WITHOUT LONG-TERM CURRENT USE OF INSULIN: ICD-10-CM

## 2021-06-11 DIAGNOSIS — F41.9 ANXIETY DISORDER, UNSPECIFIED TYPE: Primary | ICD-10-CM

## 2021-06-11 DIAGNOSIS — Z87.891 FORMER SMOKER: ICD-10-CM

## 2021-06-11 PROBLEM — N18.30 CHRONIC KIDNEY DISEASE, STAGE III (MODERATE): Status: ACTIVE | Noted: 2021-06-11

## 2021-06-11 PROBLEM — R07.9 CHEST PAIN: Status: ACTIVE | Noted: 2021-06-11

## 2021-06-11 PROCEDURE — 99214 OFFICE O/P EST MOD 30 MIN: CPT | Mod: PBBFAC | Performed by: INTERNAL MEDICINE

## 2021-06-11 PROCEDURE — 93005 ELECTROCARDIOGRAM TRACING: CPT

## 2021-06-11 PROCEDURE — 99999 PR PBB SHADOW E&M-EST. PATIENT-LVL IV: ICD-10-PCS | Mod: PBBFAC,,, | Performed by: INTERNAL MEDICINE

## 2021-06-11 PROCEDURE — 99999 PR PBB SHADOW E&M-EST. PATIENT-LVL IV: CPT | Mod: PBBFAC,,, | Performed by: INTERNAL MEDICINE

## 2021-06-11 PROCEDURE — 99205 OFFICE O/P NEW HI 60 MIN: CPT | Mod: 25,S$PBB,, | Performed by: INTERNAL MEDICINE

## 2021-06-11 PROCEDURE — 93010 ELECTROCARDIOGRAM REPORT: CPT | Mod: ,,, | Performed by: INTERNAL MEDICINE

## 2021-06-11 PROCEDURE — 93005 ELECTROCARDIOGRAM TRACING: CPT | Mod: PBBFAC | Performed by: INTERNAL MEDICINE

## 2021-06-11 PROCEDURE — 93010 EKG 12-LEAD: ICD-10-PCS | Mod: ,,, | Performed by: INTERNAL MEDICINE

## 2021-06-11 PROCEDURE — 99205 PR OFFICE/OUTPT VISIT, NEW, LEVL V, 60-74 MIN: ICD-10-PCS | Mod: 25,S$PBB,, | Performed by: INTERNAL MEDICINE

## 2021-06-11 RX ORDER — SERTRALINE HYDROCHLORIDE 50 MG/1
50 TABLET, FILM COATED ORAL DAILY
Qty: 90 TABLET | Refills: 2 | Status: SHIPPED | OUTPATIENT
Start: 2021-06-11 | End: 2021-08-03 | Stop reason: SDUPTHER

## 2021-06-14 ENCOUNTER — TELEPHONE (OUTPATIENT)
Dept: CARDIOLOGY | Facility: CLINIC | Age: 76
End: 2021-06-14

## 2021-06-14 ENCOUNTER — TELEPHONE (OUTPATIENT)
Dept: PAIN MEDICINE | Facility: CLINIC | Age: 76
End: 2021-06-14

## 2021-06-14 ENCOUNTER — TELEPHONE (OUTPATIENT)
Dept: INTERNAL MEDICINE | Facility: CLINIC | Age: 76
End: 2021-06-14

## 2021-06-14 DIAGNOSIS — R07.89 OTHER CHEST PAIN: Primary | ICD-10-CM

## 2021-06-14 DIAGNOSIS — I25.10 ATHEROSCLEROSIS OF NATIVE CORONARY ARTERY OF NATIVE HEART WITHOUT ANGINA PECTORIS: ICD-10-CM

## 2021-06-14 DIAGNOSIS — R93.1 ABNORMAL ECHOCARDIOGRAM: ICD-10-CM

## 2021-06-16 ENCOUNTER — OFFICE VISIT (OUTPATIENT)
Dept: ENDOCRINOLOGY | Facility: CLINIC | Age: 76
End: 2021-06-16
Payer: MEDICARE

## 2021-06-16 VITALS
SYSTOLIC BLOOD PRESSURE: 138 MMHG | RESPIRATION RATE: 16 BRPM | DIASTOLIC BLOOD PRESSURE: 94 MMHG | HEIGHT: 64 IN | BODY MASS INDEX: 31.66 KG/M2 | WEIGHT: 185.44 LBS | HEART RATE: 60 BPM

## 2021-06-16 DIAGNOSIS — E78.5 HYPERLIPIDEMIA, UNSPECIFIED HYPERLIPIDEMIA TYPE: ICD-10-CM

## 2021-06-16 DIAGNOSIS — E11.59 TYPE 2 DIABETES MELLITUS WITH OTHER CIRCULATORY COMPLICATION, WITHOUT LONG-TERM CURRENT USE OF INSULIN: Primary | ICD-10-CM

## 2021-06-16 DIAGNOSIS — E11.42 DIABETIC POLYNEUROPATHY ASSOCIATED WITH TYPE 2 DIABETES MELLITUS: ICD-10-CM

## 2021-06-16 DIAGNOSIS — N18.31 STAGE 3A CHRONIC KIDNEY DISEASE: ICD-10-CM

## 2021-06-16 PROCEDURE — 99999 PR PBB SHADOW E&M-EST. PATIENT-LVL V: ICD-10-PCS | Mod: PBBFAC,,, | Performed by: INTERNAL MEDICINE

## 2021-06-16 PROCEDURE — 99214 PR OFFICE/OUTPT VISIT, EST, LEVL IV, 30-39 MIN: ICD-10-PCS | Mod: S$PBB,,, | Performed by: INTERNAL MEDICINE

## 2021-06-16 PROCEDURE — 99999 PR PBB SHADOW E&M-EST. PATIENT-LVL V: CPT | Mod: PBBFAC,,, | Performed by: INTERNAL MEDICINE

## 2021-06-16 PROCEDURE — 99214 OFFICE O/P EST MOD 30 MIN: CPT | Mod: S$PBB,,, | Performed by: INTERNAL MEDICINE

## 2021-06-16 PROCEDURE — 99215 OFFICE O/P EST HI 40 MIN: CPT | Mod: PBBFAC | Performed by: INTERNAL MEDICINE

## 2021-06-16 RX ORDER — INSULIN DETEMIR 100 [IU]/ML
22 INJECTION, SOLUTION SUBCUTANEOUS DAILY
Qty: 15 ML | Refills: 3 | Status: ON HOLD | OUTPATIENT
Start: 2021-06-16 | End: 2021-09-10 | Stop reason: SDUPTHER

## 2021-06-16 RX ORDER — PEN NEEDLE, DIABETIC 30 GX3/16"
1 NEEDLE, DISPOSABLE MISCELLANEOUS DAILY
Qty: 100 EACH | Refills: 3 | Status: ON HOLD | OUTPATIENT
Start: 2021-06-16 | End: 2021-09-10 | Stop reason: HOSPADM

## 2021-06-16 RX ORDER — INSULIN ASPART 100 [IU]/ML
INJECTION, SOLUTION INTRAVENOUS; SUBCUTANEOUS
Qty: 30 ML | Refills: 3 | Status: ON HOLD | OUTPATIENT
Start: 2021-06-16 | End: 2021-09-10 | Stop reason: HOSPADM

## 2021-06-29 ENCOUNTER — OFFICE VISIT (OUTPATIENT)
Dept: INTERNAL MEDICINE | Facility: CLINIC | Age: 76
End: 2021-06-29
Payer: MEDICARE

## 2021-06-29 VITALS
HEART RATE: 98 BPM | HEIGHT: 64 IN | WEIGHT: 180.13 LBS | SYSTOLIC BLOOD PRESSURE: 129 MMHG | OXYGEN SATURATION: 93 % | DIASTOLIC BLOOD PRESSURE: 61 MMHG | BODY MASS INDEX: 30.75 KG/M2

## 2021-06-29 DIAGNOSIS — M79.7 FIBROMYALGIA: ICD-10-CM

## 2021-06-29 DIAGNOSIS — I10 ESSENTIAL HYPERTENSION: ICD-10-CM

## 2021-06-29 DIAGNOSIS — K21.9 GASTROESOPHAGEAL REFLUX DISEASE WITHOUT ESOPHAGITIS: ICD-10-CM

## 2021-06-29 DIAGNOSIS — E11.59 TYPE 2 DIABETES MELLITUS WITH OTHER CIRCULATORY COMPLICATION, WITH LONG-TERM CURRENT USE OF INSULIN: Primary | ICD-10-CM

## 2021-06-29 DIAGNOSIS — J44.9 CAFL (CHRONIC AIRFLOW LIMITATION): ICD-10-CM

## 2021-06-29 DIAGNOSIS — G89.4 CHRONIC PAIN SYNDROME: ICD-10-CM

## 2021-06-29 DIAGNOSIS — M81.0 OSTEOPOROSIS, UNSPECIFIED OSTEOPOROSIS TYPE, UNSPECIFIED PATHOLOGICAL FRACTURE PRESENCE: ICD-10-CM

## 2021-06-29 DIAGNOSIS — N18.31 STAGE 3A CHRONIC KIDNEY DISEASE: ICD-10-CM

## 2021-06-29 DIAGNOSIS — E66.9 MILD OBESITY: ICD-10-CM

## 2021-06-29 DIAGNOSIS — I50.32 HEART FAILURE, DIASTOLIC, CHRONIC: ICD-10-CM

## 2021-06-29 DIAGNOSIS — C53.9: ICD-10-CM

## 2021-06-29 DIAGNOSIS — Z12.11 SCREENING FOR MALIGNANT NEOPLASM OF COLON: ICD-10-CM

## 2021-06-29 DIAGNOSIS — F41.9 ANXIETY DISORDER, UNSPECIFIED TYPE: ICD-10-CM

## 2021-06-29 DIAGNOSIS — E78.5 HYPERLIPIDEMIA, UNSPECIFIED HYPERLIPIDEMIA TYPE: ICD-10-CM

## 2021-06-29 DIAGNOSIS — F32.A DEPRESSION, UNSPECIFIED DEPRESSION TYPE: ICD-10-CM

## 2021-06-29 DIAGNOSIS — M79.2 NEUROPATHIC PAIN: ICD-10-CM

## 2021-06-29 DIAGNOSIS — J41.1 MUCOPURULENT CHRONIC BRONCHITIS: ICD-10-CM

## 2021-06-29 DIAGNOSIS — E11.42 DIABETIC POLYNEUROPATHY ASSOCIATED WITH TYPE 2 DIABETES MELLITUS: ICD-10-CM

## 2021-06-29 DIAGNOSIS — Z79.4 TYPE 2 DIABETES MELLITUS WITH OTHER CIRCULATORY COMPLICATION, WITH LONG-TERM CURRENT USE OF INSULIN: Primary | ICD-10-CM

## 2021-06-29 PROCEDURE — 99999 PR PBB SHADOW E&M-EST. PATIENT-LVL V: CPT | Mod: PBBFAC,,, | Performed by: STUDENT IN AN ORGANIZED HEALTH CARE EDUCATION/TRAINING PROGRAM

## 2021-06-29 PROCEDURE — 99999 PR PBB SHADOW E&M-EST. PATIENT-LVL V: ICD-10-PCS | Mod: PBBFAC,,, | Performed by: STUDENT IN AN ORGANIZED HEALTH CARE EDUCATION/TRAINING PROGRAM

## 2021-06-29 PROCEDURE — 99214 PR OFFICE/OUTPT VISIT, EST, LEVL IV, 30-39 MIN: ICD-10-PCS | Mod: S$PBB,,, | Performed by: STUDENT IN AN ORGANIZED HEALTH CARE EDUCATION/TRAINING PROGRAM

## 2021-06-29 PROCEDURE — 99215 OFFICE O/P EST HI 40 MIN: CPT | Mod: PBBFAC | Performed by: STUDENT IN AN ORGANIZED HEALTH CARE EDUCATION/TRAINING PROGRAM

## 2021-06-29 PROCEDURE — 99214 OFFICE O/P EST MOD 30 MIN: CPT | Mod: S$PBB,,, | Performed by: STUDENT IN AN ORGANIZED HEALTH CARE EDUCATION/TRAINING PROGRAM

## 2021-06-30 ENCOUNTER — TELEPHONE (OUTPATIENT)
Dept: INTERNAL MEDICINE | Facility: CLINIC | Age: 76
End: 2021-06-30

## 2021-06-30 ENCOUNTER — TELEPHONE (OUTPATIENT)
Dept: ADMINISTRATIVE | Facility: OTHER | Age: 76
End: 2021-06-30

## 2021-06-30 ENCOUNTER — OFFICE VISIT (OUTPATIENT)
Dept: CARDIOLOGY | Facility: CLINIC | Age: 76
End: 2021-06-30
Payer: MEDICARE

## 2021-06-30 VITALS
DIASTOLIC BLOOD PRESSURE: 76 MMHG | SYSTOLIC BLOOD PRESSURE: 142 MMHG | BODY MASS INDEX: 31.54 KG/M2 | HEART RATE: 94 BPM | WEIGHT: 183.75 LBS

## 2021-06-30 DIAGNOSIS — I25.10 ATHEROSCLEROSIS OF NATIVE CORONARY ARTERY OF NATIVE HEART WITHOUT ANGINA PECTORIS: Primary | ICD-10-CM

## 2021-06-30 DIAGNOSIS — I25.10 ATHEROSCLEROSIS OF NATIVE CORONARY ARTERY OF NATIVE HEART WITHOUT ANGINA PECTORIS: ICD-10-CM

## 2021-06-30 DIAGNOSIS — R93.1 ABNORMAL ECHOCARDIOGRAM: ICD-10-CM

## 2021-06-30 DIAGNOSIS — E11.65 TYPE 2 DIABETES MELLITUS WITH HYPERGLYCEMIA, WITH LONG-TERM CURRENT USE OF INSULIN: ICD-10-CM

## 2021-06-30 DIAGNOSIS — Z79.4 TYPE 2 DIABETES MELLITUS WITH HYPERGLYCEMIA, WITH LONG-TERM CURRENT USE OF INSULIN: ICD-10-CM

## 2021-06-30 DIAGNOSIS — N18.31 STAGE 3A CHRONIC KIDNEY DISEASE: Primary | ICD-10-CM

## 2021-06-30 DIAGNOSIS — R07.89 OTHER CHEST PAIN: ICD-10-CM

## 2021-06-30 PROCEDURE — 99215 PR OFFICE/OUTPT VISIT, EST, LEVL V, 40-54 MIN: ICD-10-PCS | Mod: S$PBB,,, | Performed by: INTERNAL MEDICINE

## 2021-06-30 PROCEDURE — 99999 PR PBB SHADOW E&M-EST. PATIENT-LVL III: CPT | Mod: PBBFAC,,, | Performed by: INTERNAL MEDICINE

## 2021-06-30 PROCEDURE — 99999 PR PBB SHADOW E&M-EST. PATIENT-LVL III: ICD-10-PCS | Mod: PBBFAC,,, | Performed by: INTERNAL MEDICINE

## 2021-06-30 PROCEDURE — 99215 OFFICE O/P EST HI 40 MIN: CPT | Mod: S$PBB,,, | Performed by: INTERNAL MEDICINE

## 2021-06-30 PROCEDURE — 99213 OFFICE O/P EST LOW 20 MIN: CPT | Mod: PBBFAC | Performed by: INTERNAL MEDICINE

## 2021-06-30 RX ORDER — DIPHENHYDRAMINE HCL 25 MG
25 CAPSULE ORAL
Status: CANCELLED | OUTPATIENT
Start: 2021-06-30 | End: 2021-07-01

## 2021-06-30 RX ORDER — SODIUM CHLORIDE 9 MG/ML
INJECTION, SOLUTION INTRAVENOUS CONTINUOUS
Status: CANCELLED | OUTPATIENT
Start: 2021-06-30

## 2021-07-09 DIAGNOSIS — M51.36 DEGENERATION OF INTERVERTEBRAL DISC OF LUMBAR REGION: ICD-10-CM

## 2021-07-09 RX ORDER — HYDROCODONE BITARTRATE AND ACETAMINOPHEN 10; 325 MG/1; MG/1
1 TABLET ORAL EVERY 8 HOURS PRN
Qty: 90 TABLET | Refills: 0 | Status: SHIPPED | OUTPATIENT
Start: 2021-07-14 | End: 2021-08-12 | Stop reason: SDUPTHER

## 2021-07-19 ENCOUNTER — TELEPHONE (OUTPATIENT)
Dept: ADMINISTRATIVE | Facility: HOSPITAL | Age: 76
End: 2021-07-19

## 2021-07-19 RX ORDER — PROMETHAZINE HYDROCHLORIDE 25 MG/1
25 TABLET ORAL EVERY 6 HOURS PRN
Qty: 12 TABLET | Refills: 0 | Status: ON HOLD | OUTPATIENT
Start: 2021-07-19 | End: 2021-09-10 | Stop reason: HOSPADM

## 2021-07-20 ENCOUNTER — PATIENT OUTREACH (OUTPATIENT)
Dept: ADMINISTRATIVE | Facility: HOSPITAL | Age: 76
End: 2021-07-20

## 2021-07-20 ENCOUNTER — TELEPHONE (OUTPATIENT)
Dept: CARDIOLOGY | Facility: CLINIC | Age: 76
End: 2021-07-20

## 2021-07-20 ENCOUNTER — PATIENT MESSAGE (OUTPATIENT)
Dept: ADMINISTRATIVE | Facility: HOSPITAL | Age: 76
End: 2021-07-20

## 2021-07-29 ENCOUNTER — PATIENT OUTREACH (OUTPATIENT)
Dept: ADMINISTRATIVE | Facility: HOSPITAL | Age: 76
End: 2021-07-29

## 2021-07-29 ENCOUNTER — PATIENT MESSAGE (OUTPATIENT)
Dept: ADMINISTRATIVE | Facility: HOSPITAL | Age: 76
End: 2021-07-29

## 2021-08-03 ENCOUNTER — OFFICE VISIT (OUTPATIENT)
Dept: INTERNAL MEDICINE | Facility: CLINIC | Age: 76
End: 2021-08-03
Payer: MEDICARE

## 2021-08-03 VITALS
HEART RATE: 76 BPM | SYSTOLIC BLOOD PRESSURE: 136 MMHG | OXYGEN SATURATION: 96 % | BODY MASS INDEX: 30.86 KG/M2 | DIASTOLIC BLOOD PRESSURE: 80 MMHG | HEIGHT: 64 IN | WEIGHT: 180.75 LBS

## 2021-08-03 DIAGNOSIS — R91.8 LUNG INFILTRATE ON CT: ICD-10-CM

## 2021-08-03 DIAGNOSIS — G40.909 CEREBRAL SEIZURE: ICD-10-CM

## 2021-08-03 DIAGNOSIS — J98.4 LUNG ABNORMALITY: Primary | ICD-10-CM

## 2021-08-03 DIAGNOSIS — I50.32 HEART FAILURE, DIASTOLIC, CHRONIC: ICD-10-CM

## 2021-08-03 DIAGNOSIS — M32.19 SYSTEMIC LUPUS ERYTHEMATOSUS WITH OTHER ORGAN INVOLVEMENT, UNSPECIFIED SLE TYPE: ICD-10-CM

## 2021-08-03 DIAGNOSIS — M79.2 NEUROPATHIC PAIN: ICD-10-CM

## 2021-08-03 DIAGNOSIS — E78.01 FAMILIAL HYPERCHOLESTEROLEMIA: ICD-10-CM

## 2021-08-03 DIAGNOSIS — M79.7 FIBROMYALGIA: ICD-10-CM

## 2021-08-03 DIAGNOSIS — I10 HYPERTENSION, UNSPECIFIED TYPE: ICD-10-CM

## 2021-08-03 DIAGNOSIS — Z12.31 ENCOUNTER FOR SCREENING MAMMOGRAM FOR MALIGNANT NEOPLASM OF BREAST: ICD-10-CM

## 2021-08-03 DIAGNOSIS — E11.42 DIABETIC POLYNEUROPATHY ASSOCIATED WITH TYPE 2 DIABETES MELLITUS: ICD-10-CM

## 2021-08-03 DIAGNOSIS — E78.5 HYPERLIPIDEMIA, UNSPECIFIED HYPERLIPIDEMIA TYPE: ICD-10-CM

## 2021-08-03 DIAGNOSIS — G89.4 CHRONIC PAIN SYNDROME: ICD-10-CM

## 2021-08-03 DIAGNOSIS — G89.4 CHRONIC PAIN SYNDROME: Primary | ICD-10-CM

## 2021-08-03 DIAGNOSIS — R10.9 ABDOMINAL CRAMPING: ICD-10-CM

## 2021-08-03 DIAGNOSIS — N18.31 STAGE 3A CHRONIC KIDNEY DISEASE: ICD-10-CM

## 2021-08-03 DIAGNOSIS — M54.15 RADICULOPATHY OF THORACOLUMBAR REGION: ICD-10-CM

## 2021-08-03 DIAGNOSIS — F41.9 ANXIETY DISORDER, UNSPECIFIED TYPE: ICD-10-CM

## 2021-08-03 PROCEDURE — 99215 OFFICE O/P EST HI 40 MIN: CPT | Mod: S$PBB,,, | Performed by: STUDENT IN AN ORGANIZED HEALTH CARE EDUCATION/TRAINING PROGRAM

## 2021-08-03 PROCEDURE — 99999 PR PBB SHADOW E&M-EST. PATIENT-LVL IV: ICD-10-PCS | Mod: PBBFAC,,, | Performed by: STUDENT IN AN ORGANIZED HEALTH CARE EDUCATION/TRAINING PROGRAM

## 2021-08-03 PROCEDURE — 99214 OFFICE O/P EST MOD 30 MIN: CPT | Mod: PBBFAC | Performed by: STUDENT IN AN ORGANIZED HEALTH CARE EDUCATION/TRAINING PROGRAM

## 2021-08-03 PROCEDURE — 99999 PR PBB SHADOW E&M-EST. PATIENT-LVL IV: CPT | Mod: PBBFAC,,, | Performed by: STUDENT IN AN ORGANIZED HEALTH CARE EDUCATION/TRAINING PROGRAM

## 2021-08-03 PROCEDURE — 99215 PR OFFICE/OUTPT VISIT, EST, LEVL V, 40-54 MIN: ICD-10-PCS | Mod: S$PBB,,, | Performed by: STUDENT IN AN ORGANIZED HEALTH CARE EDUCATION/TRAINING PROGRAM

## 2021-08-03 RX ORDER — PREGABALIN 150 MG/1
150 CAPSULE ORAL 3 TIMES DAILY
Qty: 90 CAPSULE | Refills: 2 | Status: ON HOLD | OUTPATIENT
Start: 2021-08-03 | End: 2021-09-10 | Stop reason: HOSPADM

## 2021-08-03 RX ORDER — AMLODIPINE BESYLATE 5 MG/1
TABLET ORAL
Qty: 90 TABLET | Refills: 1 | Status: ON HOLD | OUTPATIENT
Start: 2021-08-03 | End: 2021-09-10 | Stop reason: HOSPADM

## 2021-08-03 RX ORDER — DICYCLOMINE HYDROCHLORIDE 10 MG/1
CAPSULE ORAL
Qty: 30 CAPSULE | Refills: 1 | Status: ON HOLD | OUTPATIENT
Start: 2021-08-03 | End: 2021-09-10 | Stop reason: HOSPADM

## 2021-08-03 RX ORDER — ATORVASTATIN CALCIUM 20 MG/1
40 TABLET, FILM COATED ORAL DAILY
Qty: 90 TABLET | Refills: 1 | Status: ON HOLD | OUTPATIENT
Start: 2021-08-03 | End: 2021-09-10 | Stop reason: SDUPTHER

## 2021-08-03 RX ORDER — FUROSEMIDE 20 MG/1
TABLET ORAL
Qty: 90 TABLET | Refills: 1 | Status: ON HOLD | OUTPATIENT
Start: 2021-08-03 | End: 2021-09-10 | Stop reason: HOSPADM

## 2021-08-03 RX ORDER — METHOCARBAMOL 750 MG/1
TABLET, FILM COATED ORAL
Qty: 15 TABLET | Refills: 1 | Status: SHIPPED | OUTPATIENT
Start: 2021-08-03 | End: 2021-08-12

## 2021-08-03 RX ORDER — CLONIDINE HYDROCHLORIDE 0.1 MG/1
0.1 TABLET ORAL 2 TIMES DAILY
Qty: 180 TABLET | Refills: 1 | Status: ON HOLD | OUTPATIENT
Start: 2021-08-03 | End: 2021-09-10 | Stop reason: HOSPADM

## 2021-08-03 RX ORDER — SERTRALINE HYDROCHLORIDE 100 MG/1
100 TABLET, FILM COATED ORAL DAILY
Qty: 90 TABLET | Refills: 3 | Status: ON HOLD | OUTPATIENT
Start: 2021-08-03 | End: 2021-09-10 | Stop reason: SDUPTHER

## 2021-08-05 ENCOUNTER — HOSPITAL ENCOUNTER (OUTPATIENT)
Dept: RADIOLOGY | Facility: OTHER | Age: 76
Discharge: HOME | End: 2021-08-05
Attending: STUDENT IN AN ORGANIZED HEALTH CARE EDUCATION/TRAINING PROGRAM
Payer: MEDICARE

## 2021-08-05 DIAGNOSIS — Z12.31 ENCOUNTER FOR SCREENING MAMMOGRAM FOR MALIGNANT NEOPLASM OF BREAST: ICD-10-CM

## 2021-08-05 PROCEDURE — 77067 SCR MAMMO BI INCL CAD: CPT | Mod: 26,,, | Performed by: RADIOLOGY

## 2021-08-05 PROCEDURE — 77063 MAMMO DIGITAL SCREENING BILAT WITH TOMO: ICD-10-PCS | Mod: 26,,, | Performed by: RADIOLOGY

## 2021-08-05 PROCEDURE — 77067 MAMMO DIGITAL SCREENING BILAT WITH TOMO: ICD-10-PCS | Mod: 26,,, | Performed by: RADIOLOGY

## 2021-08-05 PROCEDURE — 77063 BREAST TOMOSYNTHESIS BI: CPT | Mod: 26,,, | Performed by: RADIOLOGY

## 2021-08-05 PROCEDURE — 77067 SCR MAMMO BI INCL CAD: CPT | Mod: TC

## 2021-08-06 ENCOUNTER — TELEPHONE (OUTPATIENT)
Dept: PAIN MEDICINE | Facility: CLINIC | Age: 76
End: 2021-08-06

## 2021-08-09 ENCOUNTER — TELEPHONE (OUTPATIENT)
Dept: PAIN MEDICINE | Facility: CLINIC | Age: 76
End: 2021-08-09

## 2021-08-11 ENCOUNTER — TELEPHONE (OUTPATIENT)
Dept: PAIN MEDICINE | Facility: CLINIC | Age: 76
End: 2021-08-11

## 2021-08-12 ENCOUNTER — OFFICE VISIT (OUTPATIENT)
Dept: PAIN MEDICINE | Facility: CLINIC | Age: 76
End: 2021-08-12
Attending: ANESTHESIOLOGY
Payer: MEDICARE

## 2021-08-12 ENCOUNTER — HOSPITAL ENCOUNTER (OUTPATIENT)
Dept: RADIOLOGY | Facility: OTHER | Age: 76
Discharge: HOME | End: 2021-08-12
Attending: STUDENT IN AN ORGANIZED HEALTH CARE EDUCATION/TRAINING PROGRAM
Payer: MEDICARE

## 2021-08-12 VITALS
HEIGHT: 64 IN | TEMPERATURE: 97 F | DIASTOLIC BLOOD PRESSURE: 98 MMHG | WEIGHT: 183 LBS | RESPIRATION RATE: 18 BRPM | SYSTOLIC BLOOD PRESSURE: 146 MMHG | BODY MASS INDEX: 31.24 KG/M2 | HEART RATE: 92 BPM

## 2021-08-12 DIAGNOSIS — G89.4 CHRONIC PAIN SYNDROME: ICD-10-CM

## 2021-08-12 DIAGNOSIS — J98.4 LUNG ABNORMALITY: ICD-10-CM

## 2021-08-12 DIAGNOSIS — R91.8 LUNG INFILTRATE ON CT: ICD-10-CM

## 2021-08-12 DIAGNOSIS — M51.36 DEGENERATION OF INTERVERTEBRAL DISC OF LUMBAR REGION: ICD-10-CM

## 2021-08-12 DIAGNOSIS — E08.42 DIABETIC POLYNEUROPATHY ASSOCIATED WITH DIABETES MELLITUS DUE TO UNDERLYING CONDITION: Primary | ICD-10-CM

## 2021-08-12 PROCEDURE — 71250 CT THORAX DX C-: CPT | Mod: 26,,, | Performed by: RADIOLOGY

## 2021-08-12 PROCEDURE — 71250 CT THORAX DX C-: CPT | Mod: TC

## 2021-08-12 PROCEDURE — 71250 CT CHEST WITHOUT CONTRAST: ICD-10-PCS | Mod: 26,,, | Performed by: RADIOLOGY

## 2021-08-12 PROCEDURE — 99214 OFFICE O/P EST MOD 30 MIN: CPT | Mod: PBBFAC | Performed by: ANESTHESIOLOGY

## 2021-08-12 PROCEDURE — 99999 PR PBB SHADOW E&M-EST. PATIENT-LVL IV: ICD-10-PCS | Mod: PBBFAC,,, | Performed by: ANESTHESIOLOGY

## 2021-08-12 PROCEDURE — 99214 OFFICE O/P EST MOD 30 MIN: CPT | Mod: S$PBB,GC,, | Performed by: ANESTHESIOLOGY

## 2021-08-12 PROCEDURE — 99999 PR PBB SHADOW E&M-EST. PATIENT-LVL IV: CPT | Mod: PBBFAC,,, | Performed by: ANESTHESIOLOGY

## 2021-08-12 PROCEDURE — 99214 PR OFFICE/OUTPT VISIT, EST, LEVL IV, 30-39 MIN: ICD-10-PCS | Mod: S$PBB,GC,, | Performed by: ANESTHESIOLOGY

## 2021-08-12 RX ORDER — HYDROCODONE BITARTRATE AND ACETAMINOPHEN 10; 325 MG/1; MG/1
1 TABLET ORAL EVERY 8 HOURS PRN
Qty: 90 TABLET | Refills: 0 | Status: ON HOLD | OUTPATIENT
Start: 2021-08-13 | End: 2021-09-10 | Stop reason: HOSPADM

## 2021-08-12 RX ORDER — METHOCARBAMOL 500 MG/1
500 TABLET, FILM COATED ORAL 4 TIMES DAILY
Qty: 120 TABLET | Refills: 0 | Status: ON HOLD | OUTPATIENT
Start: 2021-08-12 | End: 2021-09-10 | Stop reason: HOSPADM

## 2021-08-13 PROBLEM — I25.84 CORONARY ARTERY CALCIFICATION: Chronic | Status: ACTIVE | Noted: 2021-08-13

## 2021-08-13 PROBLEM — I25.10 CORONARY ARTERY CALCIFICATION: Chronic | Status: ACTIVE | Noted: 2021-08-13

## 2021-08-16 ENCOUNTER — TELEPHONE (OUTPATIENT)
Dept: FAMILY MEDICINE | Facility: CLINIC | Age: 76
End: 2021-08-16

## 2021-08-23 ENCOUNTER — OFFICE VISIT (OUTPATIENT)
Dept: OPTOMETRY | Facility: CLINIC | Age: 76
End: 2021-08-23
Payer: MEDICARE

## 2021-08-23 DIAGNOSIS — H52.03 HYPEROPIA WITH ASTIGMATISM AND PRESBYOPIA, BILATERAL: ICD-10-CM

## 2021-08-23 DIAGNOSIS — H52.203 HYPEROPIA WITH ASTIGMATISM AND PRESBYOPIA, BILATERAL: ICD-10-CM

## 2021-08-23 DIAGNOSIS — H04.123 DRY EYE SYNDROME OF BOTH EYES: ICD-10-CM

## 2021-08-23 DIAGNOSIS — E11.59 TYPE 2 DIABETES MELLITUS WITH OTHER CIRCULATORY COMPLICATION, WITHOUT LONG-TERM CURRENT USE OF INSULIN: ICD-10-CM

## 2021-08-23 DIAGNOSIS — H25.13 SENILE NUCLEAR SCLEROSIS, BILATERAL: ICD-10-CM

## 2021-08-23 DIAGNOSIS — E11.36 TYPE 2 DIABETES MELLITUS WITH CATARACT: ICD-10-CM

## 2021-08-23 DIAGNOSIS — H52.4 HYPEROPIA WITH ASTIGMATISM AND PRESBYOPIA, BILATERAL: ICD-10-CM

## 2021-08-23 DIAGNOSIS — E11.9 TYPE 2 DIABETES MELLITUS WITHOUT RETINOPATHY: Primary | ICD-10-CM

## 2021-08-23 PROCEDURE — 92004 COMPRE OPH EXAM NEW PT 1/>: CPT | Mod: S$PBB,,, | Performed by: OPTOMETRIST

## 2021-08-23 PROCEDURE — 99999 PR PBB SHADOW E&M-EST. PATIENT-LVL III: ICD-10-PCS | Mod: PBBFAC,,, | Performed by: OPTOMETRIST

## 2021-08-23 PROCEDURE — 99213 OFFICE O/P EST LOW 20 MIN: CPT | Mod: PBBFAC | Performed by: OPTOMETRIST

## 2021-08-23 PROCEDURE — 92004 PR EYE EXAM, NEW PATIENT,COMPREHESV: ICD-10-PCS | Mod: S$PBB,,, | Performed by: OPTOMETRIST

## 2021-08-23 PROCEDURE — 99999 PR PBB SHADOW E&M-EST. PATIENT-LVL III: CPT | Mod: PBBFAC,,, | Performed by: OPTOMETRIST

## 2021-08-25 ENCOUNTER — HOSPITAL ENCOUNTER (OUTPATIENT)
Facility: OTHER | Age: 76
Discharge: HOME | End: 2021-08-25
Attending: ANESTHESIOLOGY | Admitting: ANESTHESIOLOGY
Payer: MEDICARE

## 2021-08-25 VITALS
HEART RATE: 86 BPM | SYSTOLIC BLOOD PRESSURE: 125 MMHG | BODY MASS INDEX: 35.93 KG/M2 | HEIGHT: 60 IN | DIASTOLIC BLOOD PRESSURE: 72 MMHG | RESPIRATION RATE: 18 BRPM | WEIGHT: 183 LBS | OXYGEN SATURATION: 97 % | TEMPERATURE: 99 F

## 2021-08-25 DIAGNOSIS — M54.15 RADICULOPATHY OF THORACOLUMBAR REGION: Primary | ICD-10-CM

## 2021-08-25 DIAGNOSIS — G89.4 CHRONIC PAIN SYNDROME: ICD-10-CM

## 2021-08-25 DIAGNOSIS — G89.29 CHRONIC PAIN: ICD-10-CM

## 2021-08-25 LAB — POCT GLUCOSE: 172 MG/DL (ref 70–110)

## 2021-08-25 PROCEDURE — 64520 PR INJECT NERV BLCK,PARAVERT SYMPATH: ICD-10-PCS | Mod: 50,,, | Performed by: ANESTHESIOLOGY

## 2021-08-25 PROCEDURE — 63600175 PHARM REV CODE 636 W HCPCS: Performed by: ANESTHESIOLOGY

## 2021-08-25 PROCEDURE — 25000003 PHARM REV CODE 250: Performed by: ANESTHESIOLOGY

## 2021-08-25 PROCEDURE — 64520 N BLOCK LUMBAR/THORACIC: CPT | Mod: 50,,, | Performed by: ANESTHESIOLOGY

## 2021-08-25 PROCEDURE — 25500020 PHARM REV CODE 255: Performed by: ANESTHESIOLOGY

## 2021-08-25 PROCEDURE — 64520 N BLOCK LUMBAR/THORACIC: CPT | Mod: 50 | Performed by: ANESTHESIOLOGY

## 2021-08-25 RX ORDER — SODIUM CHLORIDE 9 MG/ML
INJECTION, SOLUTION INTRAVENOUS CONTINUOUS
Status: DISCONTINUED | OUTPATIENT
Start: 2021-08-25 | End: 2021-08-25 | Stop reason: HOSPADM

## 2021-08-25 RX ORDER — DEXAMETHASONE SODIUM PHOSPHATE 4 MG/ML
INJECTION, SOLUTION INTRA-ARTICULAR; INTRALESIONAL; INTRAMUSCULAR; INTRAVENOUS; SOFT TISSUE
Status: DISCONTINUED | OUTPATIENT
Start: 2021-08-25 | End: 2021-08-25 | Stop reason: HOSPADM

## 2021-08-25 RX ORDER — MIDAZOLAM HYDROCHLORIDE 1 MG/ML
INJECTION INTRAMUSCULAR; INTRAVENOUS
Status: DISCONTINUED | OUTPATIENT
Start: 2021-08-25 | End: 2021-08-25 | Stop reason: HOSPADM

## 2021-08-25 RX ORDER — LIDOCAINE HYDROCHLORIDE 10 MG/ML
INJECTION INFILTRATION; PERINEURAL
Status: DISCONTINUED | OUTPATIENT
Start: 2021-08-25 | End: 2021-08-25 | Stop reason: HOSPADM

## 2021-08-25 RX ORDER — BUPIVACAINE HYDROCHLORIDE 2.5 MG/ML
INJECTION, SOLUTION EPIDURAL; INFILTRATION; INTRACAUDAL
Status: DISCONTINUED | OUTPATIENT
Start: 2021-08-25 | End: 2021-08-25 | Stop reason: HOSPADM

## 2021-08-25 RX ORDER — FENTANYL CITRATE 50 UG/ML
INJECTION, SOLUTION INTRAMUSCULAR; INTRAVENOUS
Status: DISCONTINUED | OUTPATIENT
Start: 2021-08-25 | End: 2021-08-25 | Stop reason: HOSPADM

## 2021-08-25 RX ADMIN — SODIUM CHLORIDE: 0.9 INJECTION, SOLUTION INTRAVENOUS at 01:08

## 2021-09-05 ENCOUNTER — HOSPITAL ENCOUNTER (INPATIENT)
Facility: OTHER | Age: 76
LOS: 7 days | Discharge: HOME HEALTH | DRG: 871 | End: 2021-09-12
Attending: EMERGENCY MEDICINE | Admitting: INTERNAL MEDICINE
Payer: MEDICARE

## 2021-09-05 DIAGNOSIS — N39.0 URINARY TRACT INFECTION WITHOUT HEMATURIA, SITE UNSPECIFIED: ICD-10-CM

## 2021-09-05 DIAGNOSIS — E87.20 LACTIC ACID ACIDOSIS: ICD-10-CM

## 2021-09-05 DIAGNOSIS — E11.59 TYPE 2 DIABETES MELLITUS WITH OTHER CIRCULATORY COMPLICATION, WITHOUT LONG-TERM CURRENT USE OF INSULIN: ICD-10-CM

## 2021-09-05 DIAGNOSIS — F41.9 ANXIETY DISORDER, UNSPECIFIED TYPE: ICD-10-CM

## 2021-09-05 DIAGNOSIS — I50.32 HEART FAILURE, DIASTOLIC, CHRONIC: ICD-10-CM

## 2021-09-05 DIAGNOSIS — E78.5 HYPERLIPIDEMIA, UNSPECIFIED HYPERLIPIDEMIA TYPE: ICD-10-CM

## 2021-09-05 DIAGNOSIS — E11.10 DIABETIC KETOACIDOSIS WITHOUT COMA ASSOCIATED WITH TYPE 2 DIABETES MELLITUS: Primary | ICD-10-CM

## 2021-09-05 DIAGNOSIS — Z51.5 ENCOUNTER FOR PALLIATIVE CARE: ICD-10-CM

## 2021-09-05 DIAGNOSIS — M06.9 RHEUMATOID ARTHRITIS, INVOLVING UNSPECIFIED SITE, UNSPECIFIED WHETHER RHEUMATOID FACTOR PRESENT: ICD-10-CM

## 2021-09-05 DIAGNOSIS — R07.9 CHEST PAIN: ICD-10-CM

## 2021-09-05 DIAGNOSIS — E86.1 HYPOVOLEMIA: ICD-10-CM

## 2021-09-05 LAB
ALBUMIN SERPL BCP-MCNC: 3.7 G/DL (ref 3.5–5.2)
ALP SERPL-CCNC: 113 U/L (ref 55–135)
ALT SERPL W/O P-5'-P-CCNC: 14 U/L (ref 10–44)
ANION GAP SERPL CALC-SCNC: 29 MMOL/L (ref 8–16)
AST SERPL-CCNC: 14 U/L (ref 10–40)
B-OH-BUTYR BLD STRIP-SCNC: 5.6 MMOL/L (ref 0–0.5)
BASOPHILS # BLD AUTO: 0.12 K/UL (ref 0–0.2)
BASOPHILS NFR BLD: 0.5 % (ref 0–1.9)
BILIRUB SERPL-MCNC: 0.4 MG/DL (ref 0.1–1)
BILIRUB UR QL STRIP: NEGATIVE
BNP SERPL-MCNC: 92 PG/ML (ref 0–99)
BUN SERPL-MCNC: 9 MG/DL (ref 8–23)
CALCIUM SERPL-MCNC: 9.1 MG/DL (ref 8.7–10.5)
CHLORIDE SERPL-SCNC: 99 MMOL/L (ref 95–110)
CLARITY UR: CLEAR
CO2 SERPL-SCNC: 10 MMOL/L (ref 23–29)
COLOR UR: YELLOW
CREAT SERPL-MCNC: 1.1 MG/DL (ref 0.5–1.4)
CTP QC/QA: YES
DIFFERENTIAL METHOD: ABNORMAL
EOSINOPHIL # BLD AUTO: 0.2 K/UL (ref 0–0.5)
EOSINOPHIL NFR BLD: 0.8 % (ref 0–8)
ERYTHROCYTE [DISTWIDTH] IN BLOOD BY AUTOMATED COUNT: 13.8 % (ref 11.5–14.5)
EST. GFR  (AFRICAN AMERICAN): 57 ML/MIN/1.73 M^2
EST. GFR  (NON AFRICAN AMERICAN): 49 ML/MIN/1.73 M^2
GLUCOSE SERPL-MCNC: 760 MG/DL (ref 70–110)
GLUCOSE UR QL STRIP: ABNORMAL
HCO3 UR-SCNC: 15.9 MMOL/L (ref 24–28)
HCT VFR BLD AUTO: 42.9 % (ref 37–48.5)
HCT VFR BLD CALC: 43 %PCV (ref 36–54)
HGB BLD-MCNC: 14.2 G/DL (ref 12–16)
HGB BLD-MCNC: 15 G/DL
HGB UR QL STRIP: ABNORMAL
IMM GRANULOCYTES # BLD AUTO: 0.18 K/UL (ref 0–0.04)
IMM GRANULOCYTES NFR BLD AUTO: 0.8 % (ref 0–0.5)
INR PPP: 0.9 (ref 0.8–1.2)
KETONES UR QL STRIP: ABNORMAL
LACTATE SERPL-SCNC: 5.5 MMOL/L (ref 0.5–2.2)
LEUKOCYTE ESTERASE UR QL STRIP: ABNORMAL
LYMPHOCYTES # BLD AUTO: 6.8 K/UL (ref 1–4.8)
LYMPHOCYTES NFR BLD: 30 % (ref 18–48)
MCH RBC QN AUTO: 30 PG (ref 27–31)
MCHC RBC AUTO-ENTMCNC: 33.1 G/DL (ref 32–36)
MCV RBC AUTO: 91 FL (ref 82–98)
MICROSCOPIC COMMENT: ABNORMAL
MONOCYTES # BLD AUTO: 0.8 K/UL (ref 0.3–1)
MONOCYTES NFR BLD: 3.6 % (ref 4–15)
NEUTROPHILS # BLD AUTO: 14.6 K/UL (ref 1.8–7.7)
NEUTROPHILS NFR BLD: 64.3 % (ref 38–73)
NITRITE UR QL STRIP: NEGATIVE
NRBC BLD-RTO: 0 /100 WBC
PCO2 BLDA: 42.5 MMHG (ref 35–45)
PH SMN: 7.18 [PH] (ref 7.35–7.45)
PH UR STRIP: 6 [PH] (ref 5–8)
PLATELET # BLD AUTO: 343 K/UL (ref 150–450)
PMV BLD AUTO: 10.9 FL (ref 9.2–12.9)
PO2 BLDA: 30 MMHG (ref 40–60)
POC BE: -12 MMOL/L
POC SATURATED O2: 43 % (ref 95–100)
POC TCO2: 17 MMOL/L (ref 24–29)
POCT GLUCOSE: >500 MG/DL (ref 70–110)
POCT GLUCOSE: >500 MG/DL (ref 70–110)
POTASSIUM BLD-SCNC: 4.5 MMOL/L (ref 3.5–5.1)
POTASSIUM SERPL-SCNC: 4.3 MMOL/L (ref 3.5–5.1)
PROCALCITONIN SERPL IA-MCNC: 0.05 NG/ML
PROT SERPL-MCNC: 7.6 G/DL (ref 6–8.4)
PROT UR QL STRIP: NEGATIVE
PROTHROMBIN TIME: 10.4 SEC (ref 9–12.5)
RBC # BLD AUTO: 4.73 M/UL (ref 4–5.4)
RBC #/AREA URNS HPF: 5 /HPF (ref 0–4)
SAMPLE: ABNORMAL
SARS-COV-2 RDRP RESP QL NAA+PROBE: NEGATIVE
SODIUM BLD-SCNC: 137 MMOL/L (ref 136–145)
SODIUM SERPL-SCNC: 138 MMOL/L (ref 136–145)
SP GR UR STRIP: 1.02 (ref 1–1.03)
SQUAMOUS #/AREA URNS HPF: 2 /HPF
TROPONIN I SERPL DL<=0.01 NG/ML-MCNC: 0.01 NG/ML (ref 0–0.03)
URN SPEC COLLECT METH UR: ABNORMAL
UROBILINOGEN UR STRIP-ACNC: NEGATIVE EU/DL
WBC # BLD AUTO: 22.79 K/UL (ref 3.9–12.7)
WBC #/AREA URNS HPF: 40 /HPF (ref 0–5)
YEAST URNS QL MICRO: ABNORMAL

## 2021-09-05 PROCEDURE — U0002 COVID-19 LAB TEST NON-CDC: HCPCS | Performed by: EMERGENCY MEDICINE

## 2021-09-05 PROCEDURE — 63600175 PHARM REV CODE 636 W HCPCS: Performed by: EMERGENCY MEDICINE

## 2021-09-05 PROCEDURE — 84145 PROCALCITONIN (PCT): CPT | Performed by: EMERGENCY MEDICINE

## 2021-09-05 PROCEDURE — 12000002 HC ACUTE/MED SURGE SEMI-PRIVATE ROOM

## 2021-09-05 PROCEDURE — 99291 CRITICAL CARE FIRST HOUR: CPT | Mod: 25

## 2021-09-05 PROCEDURE — 84484 ASSAY OF TROPONIN QUANT: CPT | Mod: 91 | Performed by: EMERGENCY MEDICINE

## 2021-09-05 PROCEDURE — 96365 THER/PROPH/DIAG IV INF INIT: CPT

## 2021-09-05 PROCEDURE — 85610 PROTHROMBIN TIME: CPT | Performed by: EMERGENCY MEDICINE

## 2021-09-05 PROCEDURE — 87086 URINE CULTURE/COLONY COUNT: CPT | Performed by: EMERGENCY MEDICINE

## 2021-09-05 PROCEDURE — 99222 PR INITIAL HOSPITAL CARE,LEVL II: ICD-10-PCS | Mod: ,,, | Performed by: INTERNAL MEDICINE

## 2021-09-05 PROCEDURE — 81000 URINALYSIS NONAUTO W/SCOPE: CPT | Performed by: EMERGENCY MEDICINE

## 2021-09-05 PROCEDURE — 96361 HYDRATE IV INFUSION ADD-ON: CPT

## 2021-09-05 PROCEDURE — 80053 COMPREHEN METABOLIC PANEL: CPT | Performed by: EMERGENCY MEDICINE

## 2021-09-05 PROCEDURE — 93010 ELECTROCARDIOGRAM REPORT: CPT | Mod: ,,, | Performed by: INTERNAL MEDICINE

## 2021-09-05 PROCEDURE — 82962 GLUCOSE BLOOD TEST: CPT

## 2021-09-05 PROCEDURE — 96367 TX/PROPH/DG ADDL SEQ IV INF: CPT

## 2021-09-05 PROCEDURE — 96374 THER/PROPH/DIAG INJ IV PUSH: CPT | Mod: 59

## 2021-09-05 PROCEDURE — 85025 COMPLETE CBC W/AUTO DIFF WBC: CPT | Performed by: EMERGENCY MEDICINE

## 2021-09-05 PROCEDURE — 96375 TX/PRO/DX INJ NEW DRUG ADDON: CPT

## 2021-09-05 PROCEDURE — 93005 ELECTROCARDIOGRAM TRACING: CPT

## 2021-09-05 PROCEDURE — 93010 EKG 12-LEAD: ICD-10-PCS | Mod: ,,, | Performed by: INTERNAL MEDICINE

## 2021-09-05 PROCEDURE — 99222 1ST HOSP IP/OBS MODERATE 55: CPT | Mod: ,,, | Performed by: INTERNAL MEDICINE

## 2021-09-05 PROCEDURE — 83605 ASSAY OF LACTIC ACID: CPT | Performed by: EMERGENCY MEDICINE

## 2021-09-05 PROCEDURE — 96372 THER/PROPH/DIAG INJ SC/IM: CPT | Mod: 59

## 2021-09-05 PROCEDURE — 87040 BLOOD CULTURE FOR BACTERIA: CPT | Mod: 59 | Performed by: EMERGENCY MEDICINE

## 2021-09-05 PROCEDURE — 96366 THER/PROPH/DIAG IV INF ADDON: CPT

## 2021-09-05 PROCEDURE — 82010 KETONE BODYS QUAN: CPT | Performed by: EMERGENCY MEDICINE

## 2021-09-05 PROCEDURE — 25000003 PHARM REV CODE 250: Performed by: EMERGENCY MEDICINE

## 2021-09-05 PROCEDURE — 83880 ASSAY OF NATRIURETIC PEPTIDE: CPT | Performed by: EMERGENCY MEDICINE

## 2021-09-05 PROCEDURE — 25000242 PHARM REV CODE 250 ALT 637 W/ HCPCS

## 2021-09-05 RX ORDER — NITROGLYCERIN 0.4 MG/1
TABLET SUBLINGUAL
Status: COMPLETED
Start: 2021-09-05 | End: 2021-09-05

## 2021-09-05 RX ORDER — NITROGLYCERIN 0.4 MG/1
0.4 TABLET SUBLINGUAL EVERY 5 MIN PRN
Status: DISCONTINUED | OUTPATIENT
Start: 2021-09-05 | End: 2021-09-12 | Stop reason: HOSPADM

## 2021-09-05 RX ORDER — MORPHINE SULFATE 2 MG/ML
2 INJECTION, SOLUTION INTRAMUSCULAR; INTRAVENOUS
Status: COMPLETED | OUTPATIENT
Start: 2021-09-05 | End: 2021-09-05

## 2021-09-05 RX ORDER — DEXTROSE MONOHYDRATE 100 MG/ML
INJECTION, SOLUTION INTRAVENOUS
Status: DISCONTINUED | OUTPATIENT
Start: 2021-09-05 | End: 2021-09-06

## 2021-09-05 RX ORDER — SODIUM CHLORIDE 9 MG/ML
INJECTION, SOLUTION INTRAVENOUS
Status: COMPLETED | OUTPATIENT
Start: 2021-09-05 | End: 2021-09-05

## 2021-09-05 RX ORDER — ENOXAPARIN SODIUM 100 MG/ML
1 INJECTION SUBCUTANEOUS
Status: COMPLETED | OUTPATIENT
Start: 2021-09-05 | End: 2021-09-05

## 2021-09-05 RX ORDER — LEVOFLOXACIN 500 MG/1
500 TABLET, FILM COATED ORAL
Status: COMPLETED | OUTPATIENT
Start: 2021-09-05 | End: 2021-09-05

## 2021-09-05 RX ADMIN — NITROGLYCERIN 0.4 MG: 0.4 TABLET, ORALLY DISINTEGRATING SUBLINGUAL at 08:09

## 2021-09-05 RX ADMIN — INSULIN HUMAN 6 UNITS/HR: 1 INJECTION, SOLUTION INTRAVENOUS at 10:09

## 2021-09-05 RX ADMIN — ENOXAPARIN SODIUM 90 MG: 100 INJECTION SUBCUTANEOUS at 11:09

## 2021-09-05 RX ADMIN — PROMETHAZINE HYDROCHLORIDE 12.5 MG: 25 INJECTION INTRAMUSCULAR; INTRAVENOUS at 11:09

## 2021-09-05 RX ADMIN — LEVOFLOXACIN 500 MG: 500 TABLET, FILM COATED ORAL at 11:09

## 2021-09-05 RX ADMIN — PROMETHAZINE HYDROCHLORIDE 12.5 MG: 25 INJECTION INTRAMUSCULAR; INTRAVENOUS at 09:09

## 2021-09-05 RX ADMIN — MORPHINE SULFATE 2 MG: 2 INJECTION, SOLUTION INTRAMUSCULAR; INTRAVENOUS at 09:09

## 2021-09-05 RX ADMIN — NITROGLYCERIN 0.4 MG: 0.4 TABLET SUBLINGUAL at 08:09

## 2021-09-05 RX ADMIN — SODIUM CHLORIDE 1000 ML: 0.9 INJECTION, SOLUTION INTRAVENOUS at 09:09

## 2021-09-05 RX ADMIN — SODIUM CHLORIDE 125 ML/HR: 0.9 INJECTION, SOLUTION INTRAVENOUS at 09:09

## 2021-09-06 PROBLEM — J18.9 LEFT LOWER LOBE PNEUMONIA: Status: ACTIVE | Noted: 2021-09-06

## 2021-09-06 PROBLEM — N39.0 UTI (URINARY TRACT INFECTION): Status: ACTIVE | Noted: 2021-09-06

## 2021-09-06 PROBLEM — A41.9 SEPSIS: Status: ACTIVE | Noted: 2021-09-06

## 2021-09-06 LAB
ALBUMIN SERPL BCP-MCNC: 3.6 G/DL (ref 3.5–5.2)
ALLENS TEST: ABNORMAL
ALLENS TEST: ABNORMAL
ALP SERPL-CCNC: 114 U/L (ref 55–135)
ALT SERPL W/O P-5'-P-CCNC: 15 U/L (ref 10–44)
ANION GAP SERPL CALC-SCNC: 14 MMOL/L (ref 8–16)
ANION GAP SERPL CALC-SCNC: 26 MMOL/L (ref 8–16)
ANION GAP SERPL CALC-SCNC: 9 MMOL/L (ref 8–16)
ANION GAP SERPL CALC-SCNC: ABNORMAL MMOL/L (ref 8–16)
ANION GAP SERPL CALC-SCNC: ABNORMAL MMOL/L (ref 8–16)
AST SERPL-CCNC: 12 U/L (ref 10–40)
B-OH-BUTYR BLD STRIP-SCNC: 3.3 MMOL/L (ref 0–0.5)
BASOPHILS # BLD AUTO: 0.12 K/UL (ref 0–0.2)
BASOPHILS NFR BLD: 0.4 % (ref 0–1.9)
BILIRUB SERPL-MCNC: 0.3 MG/DL (ref 0.1–1)
BUN SERPL-MCNC: 11 MG/DL (ref 8–23)
BUN SERPL-MCNC: 12 MG/DL (ref 8–23)
BUN SERPL-MCNC: 12 MG/DL (ref 8–23)
BUN SERPL-MCNC: 13 MG/DL (ref 8–23)
BUN SERPL-MCNC: 14 MG/DL (ref 8–23)
BUN SERPL-MCNC: 18 MG/DL (ref 8–23)
BUN SERPL-MCNC: 18 MG/DL (ref 8–23)
CALCIUM SERPL-MCNC: 7.9 MG/DL (ref 8.7–10.5)
CALCIUM SERPL-MCNC: 8.4 MG/DL (ref 8.7–10.5)
CALCIUM SERPL-MCNC: 8.5 MG/DL (ref 8.7–10.5)
CALCIUM SERPL-MCNC: 8.5 MG/DL (ref 8.7–10.5)
CALCIUM SERPL-MCNC: 8.7 MG/DL (ref 8.7–10.5)
CALCIUM SERPL-MCNC: 8.7 MG/DL (ref 8.7–10.5)
CALCIUM SERPL-MCNC: 8.9 MG/DL (ref 8.7–10.5)
CHLORIDE SERPL-SCNC: 106 MMOL/L (ref 95–110)
CHLORIDE SERPL-SCNC: 108 MMOL/L (ref 95–110)
CHLORIDE SERPL-SCNC: 112 MMOL/L (ref 95–110)
CHLORIDE SERPL-SCNC: 112 MMOL/L (ref 95–110)
CHLORIDE SERPL-SCNC: 119 MMOL/L (ref 95–110)
CHLORIDE SERPL-SCNC: 119 MMOL/L (ref 95–110)
CHLORIDE SERPL-SCNC: 122 MMOL/L (ref 95–110)
CO2 SERPL-SCNC: 10 MMOL/L (ref 23–29)
CO2 SERPL-SCNC: 14 MMOL/L (ref 23–29)
CO2 SERPL-SCNC: 7 MMOL/L (ref 23–29)
CO2 SERPL-SCNC: 7 MMOL/L (ref 23–29)
CO2 SERPL-SCNC: 8 MMOL/L (ref 23–29)
CO2 SERPL-SCNC: <5 MMOL/L (ref 23–29)
CO2 SERPL-SCNC: <5 MMOL/L (ref 23–29)
CREAT SERPL-MCNC: 1.1 MG/DL (ref 0.5–1.4)
CREAT SERPL-MCNC: 1.2 MG/DL (ref 0.5–1.4)
CREAT SERPL-MCNC: 1.2 MG/DL (ref 0.5–1.4)
CREAT SERPL-MCNC: 1.3 MG/DL (ref 0.5–1.4)
CREAT SERPL-MCNC: 1.4 MG/DL (ref 0.5–1.4)
CREAT SERPL-MCNC: 1.6 MG/DL (ref 0.5–1.4)
CREAT SERPL-MCNC: 1.6 MG/DL (ref 0.5–1.4)
DELSYS: ABNORMAL
DELSYS: ABNORMAL
DIFFERENTIAL METHOD: ABNORMAL
EOSINOPHIL # BLD AUTO: 0 K/UL (ref 0–0.5)
EOSINOPHIL NFR BLD: 0 % (ref 0–8)
ERYTHROCYTE [DISTWIDTH] IN BLOOD BY AUTOMATED COUNT: 14.4 % (ref 11.5–14.5)
EST. GFR  (AFRICAN AMERICAN): 36 ML/MIN/1.73 M^2
EST. GFR  (AFRICAN AMERICAN): 36 ML/MIN/1.73 M^2
EST. GFR  (AFRICAN AMERICAN): 42 ML/MIN/1.73 M^2
EST. GFR  (AFRICAN AMERICAN): 46 ML/MIN/1.73 M^2
EST. GFR  (AFRICAN AMERICAN): 51 ML/MIN/1.73 M^2
EST. GFR  (AFRICAN AMERICAN): 51 ML/MIN/1.73 M^2
EST. GFR  (AFRICAN AMERICAN): 57 ML/MIN/1.73 M^2
EST. GFR  (NON AFRICAN AMERICAN): 31 ML/MIN/1.73 M^2
EST. GFR  (NON AFRICAN AMERICAN): 31 ML/MIN/1.73 M^2
EST. GFR  (NON AFRICAN AMERICAN): 37 ML/MIN/1.73 M^2
EST. GFR  (NON AFRICAN AMERICAN): 40 ML/MIN/1.73 M^2
EST. GFR  (NON AFRICAN AMERICAN): 44 ML/MIN/1.73 M^2
EST. GFR  (NON AFRICAN AMERICAN): 44 ML/MIN/1.73 M^2
EST. GFR  (NON AFRICAN AMERICAN): 49 ML/MIN/1.73 M^2
ESTIMATED AVG GLUCOSE: 280 MG/DL (ref 68–131)
GLUCOSE SERPL-MCNC: 134 MG/DL (ref 70–110)
GLUCOSE SERPL-MCNC: 134 MG/DL (ref 70–110)
GLUCOSE SERPL-MCNC: 240 MG/DL (ref 70–110)
GLUCOSE SERPL-MCNC: 383 MG/DL (ref 70–110)
GLUCOSE SERPL-MCNC: 383 MG/DL (ref 70–110)
GLUCOSE SERPL-MCNC: 673 MG/DL (ref 70–110)
GLUCOSE SERPL-MCNC: 678 MG/DL (ref 70–110)
GLUCOSE SERPL-MCNC: 688 MG/DL (ref 70–110)
GLUCOSE SERPL-MCNC: 706 MG/DL (ref 70–110)
GLUCOSE SERPL-MCNC: 708 MG/DL (ref 70–110)
HBA1C MFR BLD: 11.4 % (ref 4–5.6)
HCO3 UR-SCNC: 12.2 MMOL/L (ref 24–28)
HCO3 UR-SCNC: 2.6 MMOL/L (ref 24–28)
HCT VFR BLD AUTO: 40.2 % (ref 37–48.5)
HGB BLD-MCNC: 12.7 G/DL (ref 12–16)
IMM GRANULOCYTES # BLD AUTO: 0.78 K/UL (ref 0–0.04)
IMM GRANULOCYTES NFR BLD AUTO: 2.3 % (ref 0–0.5)
LACTATE SERPL-SCNC: 1.6 MMOL/L (ref 0.5–2.2)
LACTATE SERPL-SCNC: 1.6 MMOL/L (ref 0.5–2.2)
LACTATE SERPL-SCNC: 3.3 MMOL/L (ref 0.5–2.2)
LACTATE SERPL-SCNC: 4.4 MMOL/L (ref 0.5–2.2)
LACTATE SERPL-SCNC: 4.5 MMOL/L (ref 0.5–2.2)
LACTATE SERPL-SCNC: 7.3 MMOL/L (ref 0.5–2.2)
LYMPHOCYTES # BLD AUTO: 1.8 K/UL (ref 1–4.8)
LYMPHOCYTES NFR BLD: 5.3 % (ref 18–48)
MAGNESIUM SERPL-MCNC: 2 MG/DL (ref 1.6–2.6)
MCH RBC QN AUTO: 30.2 PG (ref 27–31)
MCHC RBC AUTO-ENTMCNC: 31.6 G/DL (ref 32–36)
MCV RBC AUTO: 96 FL (ref 82–98)
MODE: ABNORMAL
MONOCYTES # BLD AUTO: 1.2 K/UL (ref 0.3–1)
MONOCYTES NFR BLD: 3.5 % (ref 4–15)
NEUTROPHILS # BLD AUTO: 30.2 K/UL (ref 1.8–7.7)
NEUTROPHILS NFR BLD: 88.5 % (ref 38–73)
NRBC BLD-RTO: 0 /100 WBC
PCO2 BLDA: 11.7 MMHG (ref 35–45)
PCO2 BLDA: 28.5 MMHG (ref 35–45)
PH SMN: 6.95 [PH] (ref 7.35–7.45)
PH SMN: 7.24 [PH] (ref 7.35–7.45)
PHOSPHATE SERPL-MCNC: 5.2 MG/DL (ref 2.7–4.5)
PHOSPHATE SERPL-MCNC: 5.2 MG/DL (ref 2.7–4.5)
PLATELET # BLD AUTO: 391 K/UL (ref 150–450)
PMV BLD AUTO: 11.1 FL (ref 9.2–12.9)
PO2 BLDA: 112 MMHG (ref 80–100)
PO2 BLDA: 35 MMHG (ref 40–60)
POC BE: -15 MMOL/L
POC BE: -29 MMOL/L
POC SATURATED O2: 58 % (ref 95–100)
POC SATURATED O2: 95 % (ref 95–100)
POC TCO2: 13 MMOL/L (ref 24–29)
POC TCO2: <5 MMOL/L (ref 23–27)
POCT GLUCOSE: 175 MG/DL (ref 70–110)
POCT GLUCOSE: 204 MG/DL (ref 70–110)
POCT GLUCOSE: 212 MG/DL (ref 70–110)
POCT GLUCOSE: 224 MG/DL (ref 70–110)
POCT GLUCOSE: 230 MG/DL (ref 70–110)
POCT GLUCOSE: 238 MG/DL (ref 70–110)
POCT GLUCOSE: 315 MG/DL (ref 70–110)
POCT GLUCOSE: 366 MG/DL (ref 70–110)
POCT GLUCOSE: 411 MG/DL (ref 70–110)
POCT GLUCOSE: 449 MG/DL (ref 70–110)
POCT GLUCOSE: >500 MG/DL (ref 70–110)
POTASSIUM SERPL-SCNC: 3.2 MMOL/L (ref 3.5–5.1)
POTASSIUM SERPL-SCNC: 3.7 MMOL/L (ref 3.5–5.1)
POTASSIUM SERPL-SCNC: 4 MMOL/L (ref 3.5–5.1)
POTASSIUM SERPL-SCNC: 5.2 MMOL/L (ref 3.5–5.1)
POTASSIUM SERPL-SCNC: 5.4 MMOL/L (ref 3.5–5.1)
POTASSIUM SERPL-SCNC: 5.5 MMOL/L (ref 3.5–5.1)
POTASSIUM SERPL-SCNC: 6 MMOL/L (ref 3.5–5.1)
PROT SERPL-MCNC: 7.3 G/DL (ref 6–8.4)
RBC # BLD AUTO: 4.21 M/UL (ref 4–5.4)
SAMPLE: ABNORMAL
SAMPLE: ABNORMAL
SITE: ABNORMAL
SITE: ABNORMAL
SODIUM SERPL-SCNC: 140 MMOL/L (ref 136–145)
SODIUM SERPL-SCNC: 141 MMOL/L (ref 136–145)
SODIUM SERPL-SCNC: 142 MMOL/L (ref 136–145)
SODIUM SERPL-SCNC: 144 MMOL/L (ref 136–145)
SODIUM SERPL-SCNC: 145 MMOL/L (ref 136–145)
SODIUM SERPL-SCNC: 146 MMOL/L (ref 136–145)
SODIUM SERPL-SCNC: 146 MMOL/L (ref 136–145)
SP02: 98
TROPONIN I SERPL DL<=0.01 NG/ML-MCNC: <0.006 NG/ML (ref 0–0.03)
WBC # BLD AUTO: 34.06 K/UL (ref 3.9–12.7)

## 2021-09-06 PROCEDURE — 84100 ASSAY OF PHOSPHORUS: CPT | Performed by: PHYSICIAN ASSISTANT

## 2021-09-06 PROCEDURE — 83605 ASSAY OF LACTIC ACID: CPT | Performed by: EMERGENCY MEDICINE

## 2021-09-06 PROCEDURE — 99223 PR INITIAL HOSPITAL CARE,LEVL III: ICD-10-PCS | Mod: AI,,, | Performed by: INTERNAL MEDICINE

## 2021-09-06 PROCEDURE — 99232 PR SUBSEQUENT HOSPITAL CARE,LEVL II: ICD-10-PCS | Mod: ,,, | Performed by: INTERNAL MEDICINE

## 2021-09-06 PROCEDURE — C1751 CATH, INF, PER/CENT/MIDLINE: HCPCS

## 2021-09-06 PROCEDURE — 20000000 HC ICU ROOM

## 2021-09-06 PROCEDURE — 99223 1ST HOSP IP/OBS HIGH 75: CPT | Mod: AI,,, | Performed by: INTERNAL MEDICINE

## 2021-09-06 PROCEDURE — 63600175 PHARM REV CODE 636 W HCPCS: Performed by: EMERGENCY MEDICINE

## 2021-09-06 PROCEDURE — 36415 COLL VENOUS BLD VENIPUNCTURE: CPT | Performed by: INTERNAL MEDICINE

## 2021-09-06 PROCEDURE — 36600 WITHDRAWAL OF ARTERIAL BLOOD: CPT

## 2021-09-06 PROCEDURE — 25000003 PHARM REV CODE 250: Performed by: EMERGENCY MEDICINE

## 2021-09-06 PROCEDURE — 99232 SBSQ HOSP IP/OBS MODERATE 35: CPT | Mod: ,,, | Performed by: INTERNAL MEDICINE

## 2021-09-06 PROCEDURE — 82803 BLOOD GASES ANY COMBINATION: CPT

## 2021-09-06 PROCEDURE — 80048 BASIC METABOLIC PNL TOTAL CA: CPT | Mod: 91 | Performed by: PHYSICIAN ASSISTANT

## 2021-09-06 PROCEDURE — 99900035 HC TECH TIME PER 15 MIN (STAT)

## 2021-09-06 PROCEDURE — 25000003 PHARM REV CODE 250: Performed by: PHYSICIAN ASSISTANT

## 2021-09-06 PROCEDURE — 36415 COLL VENOUS BLD VENIPUNCTURE: CPT | Performed by: PHYSICIAN ASSISTANT

## 2021-09-06 PROCEDURE — 36569 INSJ PICC 5 YR+ W/O IMAGING: CPT

## 2021-09-06 PROCEDURE — S5010 5% DEXTROSE AND 0.45% SALINE: HCPCS | Performed by: PHYSICIAN ASSISTANT

## 2021-09-06 PROCEDURE — 63600175 PHARM REV CODE 636 W HCPCS: Performed by: PHYSICIAN ASSISTANT

## 2021-09-06 PROCEDURE — 80048 BASIC METABOLIC PNL TOTAL CA: CPT | Mod: 91 | Performed by: INTERNAL MEDICINE

## 2021-09-06 PROCEDURE — 76937 US GUIDE VASCULAR ACCESS: CPT

## 2021-09-06 PROCEDURE — 94761 N-INVAS EAR/PLS OXIMETRY MLT: CPT

## 2021-09-06 PROCEDURE — 83036 HEMOGLOBIN GLYCOSYLATED A1C: CPT | Performed by: PHYSICIAN ASSISTANT

## 2021-09-06 PROCEDURE — 63600175 PHARM REV CODE 636 W HCPCS: Performed by: INTERNAL MEDICINE

## 2021-09-06 PROCEDURE — 25000003 PHARM REV CODE 250: Performed by: INTERNAL MEDICINE

## 2021-09-06 PROCEDURE — 63600175 PHARM REV CODE 636 W HCPCS: Performed by: STUDENT IN AN ORGANIZED HEALTH CARE EDUCATION/TRAINING PROGRAM

## 2021-09-06 PROCEDURE — 80053 COMPREHEN METABOLIC PANEL: CPT | Performed by: EMERGENCY MEDICINE

## 2021-09-06 PROCEDURE — A4216 STERILE WATER/SALINE, 10 ML: HCPCS | Performed by: INTERNAL MEDICINE

## 2021-09-06 PROCEDURE — 82010 KETONE BODYS QUAN: CPT | Performed by: STUDENT IN AN ORGANIZED HEALTH CARE EDUCATION/TRAINING PROGRAM

## 2021-09-06 PROCEDURE — 83735 ASSAY OF MAGNESIUM: CPT | Performed by: PHYSICIAN ASSISTANT

## 2021-09-06 PROCEDURE — 82947 ASSAY GLUCOSE BLOOD QUANT: CPT | Performed by: PHYSICIAN ASSISTANT

## 2021-09-06 PROCEDURE — 83605 ASSAY OF LACTIC ACID: CPT | Mod: 91 | Performed by: PHYSICIAN ASSISTANT

## 2021-09-06 PROCEDURE — 85025 COMPLETE CBC W/AUTO DIFF WBC: CPT | Performed by: PHYSICIAN ASSISTANT

## 2021-09-06 RX ORDER — ACETAMINOPHEN 325 MG/1
650 TABLET ORAL EVERY 4 HOURS PRN
Status: DISCONTINUED | OUTPATIENT
Start: 2021-09-06 | End: 2021-09-07

## 2021-09-06 RX ORDER — SODIUM CHLORIDE, SODIUM LACTATE, POTASSIUM CHLORIDE, CALCIUM CHLORIDE 600; 310; 30; 20 MG/100ML; MG/100ML; MG/100ML; MG/100ML
INJECTION, SOLUTION INTRAVENOUS CONTINUOUS
Status: DISCONTINUED | OUTPATIENT
Start: 2021-09-06 | End: 2021-09-07

## 2021-09-06 RX ORDER — TALC
6 POWDER (GRAM) TOPICAL NIGHTLY PRN
Status: DISCONTINUED | OUTPATIENT
Start: 2021-09-06 | End: 2021-09-12 | Stop reason: HOSPADM

## 2021-09-06 RX ORDER — SODIUM CHLORIDE 0.9 % (FLUSH) 0.9 %
10 SYRINGE (ML) INJECTION
Status: DISCONTINUED | OUTPATIENT
Start: 2021-09-06 | End: 2021-09-12 | Stop reason: HOSPADM

## 2021-09-06 RX ORDER — SODIUM CHLORIDE 9 MG/ML
125 INJECTION, SOLUTION INTRAVENOUS CONTINUOUS
Status: DISCONTINUED | OUTPATIENT
Start: 2021-09-06 | End: 2021-09-06

## 2021-09-06 RX ORDER — AMOXICILLIN 250 MG
1 CAPSULE ORAL 2 TIMES DAILY PRN
Status: DISCONTINUED | OUTPATIENT
Start: 2021-09-06 | End: 2021-09-12 | Stop reason: HOSPADM

## 2021-09-06 RX ORDER — ONDANSETRON 2 MG/ML
4 INJECTION INTRAMUSCULAR; INTRAVENOUS
Status: COMPLETED | OUTPATIENT
Start: 2021-09-06 | End: 2021-09-06

## 2021-09-06 RX ORDER — AMLODIPINE BESYLATE 5 MG/1
5 TABLET ORAL DAILY
Status: DISCONTINUED | OUTPATIENT
Start: 2021-09-06 | End: 2021-09-10

## 2021-09-06 RX ORDER — SERTRALINE HYDROCHLORIDE 100 MG/1
100 TABLET, FILM COATED ORAL DAILY
Status: DISCONTINUED | OUTPATIENT
Start: 2021-09-06 | End: 2021-09-12 | Stop reason: HOSPADM

## 2021-09-06 RX ORDER — POTASSIUM CHLORIDE 7.45 MG/ML
10 INJECTION INTRAVENOUS
Status: COMPLETED | OUTPATIENT
Start: 2021-09-06 | End: 2021-09-06

## 2021-09-06 RX ORDER — CLONIDINE HYDROCHLORIDE 0.1 MG/1
0.1 TABLET ORAL 2 TIMES DAILY
Status: DISCONTINUED | OUTPATIENT
Start: 2021-09-06 | End: 2021-09-07

## 2021-09-06 RX ORDER — SODIUM CHLORIDE 0.9 % (FLUSH) 0.9 %
10 SYRINGE (ML) INJECTION EVERY 6 HOURS
Status: DISCONTINUED | OUTPATIENT
Start: 2021-09-06 | End: 2021-09-12 | Stop reason: HOSPADM

## 2021-09-06 RX ORDER — ONDANSETRON 2 MG/ML
4 INJECTION INTRAMUSCULAR; INTRAVENOUS EVERY 6 HOURS PRN
Status: DISCONTINUED | OUTPATIENT
Start: 2021-09-06 | End: 2021-09-12 | Stop reason: HOSPADM

## 2021-09-06 RX ORDER — DEXTROSE MONOHYDRATE, SODIUM CHLORIDE, AND POTASSIUM CHLORIDE 50; 1.49; 4.5 G/1000ML; G/1000ML; G/1000ML
INJECTION, SOLUTION INTRAVENOUS CONTINUOUS
Status: DISCONTINUED | OUTPATIENT
Start: 2021-09-06 | End: 2021-09-07

## 2021-09-06 RX ORDER — SODIUM CHLORIDE 0.9 % (FLUSH) 0.9 %
10 SYRINGE (ML) INJECTION
Status: DISCONTINUED | OUTPATIENT
Start: 2021-09-06 | End: 2021-09-07

## 2021-09-06 RX ORDER — DEXTROSE MONOHYDRATE AND SODIUM CHLORIDE 5; .45 G/100ML; G/100ML
125 INJECTION, SOLUTION INTRAVENOUS CONTINUOUS PRN
Status: DISCONTINUED | OUTPATIENT
Start: 2021-09-06 | End: 2021-09-07

## 2021-09-06 RX ORDER — LEVOFLOXACIN 5 MG/ML
500 INJECTION, SOLUTION INTRAVENOUS
Status: DISCONTINUED | OUTPATIENT
Start: 2021-09-06 | End: 2021-09-06

## 2021-09-06 RX ORDER — MUPIROCIN 20 MG/G
OINTMENT TOPICAL 2 TIMES DAILY
Status: COMPLETED | OUTPATIENT
Start: 2021-09-06 | End: 2021-09-10

## 2021-09-06 RX ORDER — ASPIRIN 81 MG/1
81 TABLET ORAL DAILY
Status: DISCONTINUED | OUTPATIENT
Start: 2021-09-06 | End: 2021-09-12 | Stop reason: HOSPADM

## 2021-09-06 RX ORDER — PANTOPRAZOLE SODIUM 40 MG/1
40 TABLET, DELAYED RELEASE ORAL DAILY
Status: DISCONTINUED | OUTPATIENT
Start: 2021-09-06 | End: 2021-09-12 | Stop reason: HOSPADM

## 2021-09-06 RX ORDER — CEFEPIME HYDROCHLORIDE 1 G/50ML
2 INJECTION, SOLUTION INTRAVENOUS
Status: DISCONTINUED | OUTPATIENT
Start: 2021-09-06 | End: 2021-09-07

## 2021-09-06 RX ORDER — PREGABALIN 75 MG/1
75 CAPSULE ORAL 3 TIMES DAILY
Status: DISCONTINUED | OUTPATIENT
Start: 2021-09-06 | End: 2021-09-12 | Stop reason: HOSPADM

## 2021-09-06 RX ORDER — TALC
6 POWDER (GRAM) TOPICAL NIGHTLY PRN
Status: DISCONTINUED | OUTPATIENT
Start: 2021-09-06 | End: 2021-09-06 | Stop reason: SDUPTHER

## 2021-09-06 RX ORDER — SODIUM CHLORIDE 9 MG/ML
INJECTION, SOLUTION INTRAVENOUS
Status: COMPLETED | OUTPATIENT
Start: 2021-09-06 | End: 2021-09-06

## 2021-09-06 RX ORDER — ATORVASTATIN CALCIUM 20 MG/1
40 TABLET, FILM COATED ORAL DAILY
Status: DISCONTINUED | OUTPATIENT
Start: 2021-09-06 | End: 2021-09-12 | Stop reason: HOSPADM

## 2021-09-06 RX ADMIN — POTASSIUM CHLORIDE 10 MEQ: 7.46 INJECTION, SOLUTION INTRAVENOUS at 09:09

## 2021-09-06 RX ADMIN — INSULIN HUMAN 17 UNITS/HR: 1 INJECTION, SOLUTION INTRAVENOUS at 08:09

## 2021-09-06 RX ADMIN — SODIUM CHLORIDE, SODIUM LACTATE, POTASSIUM CHLORIDE, AND CALCIUM CHLORIDE 1000 ML: .6; .31; .03; .02 INJECTION, SOLUTION INTRAVENOUS at 11:09

## 2021-09-06 RX ADMIN — SODIUM CHLORIDE 125 ML/HR: 0.9 INJECTION, SOLUTION INTRAVENOUS at 10:09

## 2021-09-06 RX ADMIN — DEXTROSE, SODIUM CHLORIDE, AND POTASSIUM CHLORIDE: 5; .45; .15 INJECTION INTRAVENOUS at 08:09

## 2021-09-06 RX ADMIN — MUPIROCIN: 20 OINTMENT TOPICAL at 12:09

## 2021-09-06 RX ADMIN — POTASSIUM CHLORIDE 10 MEQ: 7.46 INJECTION, SOLUTION INTRAVENOUS at 07:09

## 2021-09-06 RX ADMIN — PROMETHAZINE HYDROCHLORIDE 12.5 MG: 25 INJECTION INTRAMUSCULAR; INTRAVENOUS at 08:09

## 2021-09-06 RX ADMIN — SODIUM CHLORIDE, SODIUM LACTATE, POTASSIUM CHLORIDE, AND CALCIUM CHLORIDE 1000 ML: .6; .31; .03; .02 INJECTION, SOLUTION INTRAVENOUS at 05:09

## 2021-09-06 RX ADMIN — SODIUM CHLORIDE, PRESERVATIVE FREE 10 ML: 5 INJECTION INTRAVENOUS at 05:09

## 2021-09-06 RX ADMIN — CEFEPIME HYDROCHLORIDE 2 G: 2 INJECTION, SOLUTION INTRAVENOUS at 01:09

## 2021-09-06 RX ADMIN — ONDANSETRON 4 MG: 2 INJECTION INTRAMUSCULAR; INTRAVENOUS at 05:09

## 2021-09-06 RX ADMIN — VANCOMYCIN HYDROCHLORIDE 1500 MG: 1.5 INJECTION, POWDER, LYOPHILIZED, FOR SOLUTION INTRAVENOUS at 12:09

## 2021-09-06 RX ADMIN — PREGABALIN 75 MG: 75 CAPSULE ORAL at 02:09

## 2021-09-06 RX ADMIN — DEXTROSE AND SODIUM CHLORIDE 125 ML/HR: 5; .45 INJECTION, SOLUTION INTRAVENOUS at 06:09

## 2021-09-06 RX ADMIN — SODIUM CHLORIDE 125 ML/HR: 0.9 INJECTION, SOLUTION INTRAVENOUS at 02:09

## 2021-09-06 RX ADMIN — SODIUM CHLORIDE: 0.9 INJECTION, SOLUTION INTRAVENOUS at 02:09

## 2021-09-06 RX ADMIN — MUPIROCIN: 20 OINTMENT TOPICAL at 09:09

## 2021-09-06 RX ADMIN — SODIUM CHLORIDE 500 ML: 0.9 INJECTION, SOLUTION INTRAVENOUS at 03:09

## 2021-09-06 RX ADMIN — INSULIN HUMAN 11.5 UNITS/HR: 1 INJECTION, SOLUTION INTRAVENOUS at 01:09

## 2021-09-06 RX ADMIN — SODIUM CHLORIDE, SODIUM LACTATE, POTASSIUM CHLORIDE, AND CALCIUM CHLORIDE: .6; .31; .03; .02 INJECTION, SOLUTION INTRAVENOUS at 02:09

## 2021-09-06 RX ADMIN — ONDANSETRON 4 MG: 2 INJECTION INTRAMUSCULAR; INTRAVENOUS at 01:09

## 2021-09-07 ENCOUNTER — PATIENT OUTREACH (OUTPATIENT)
Dept: ADMINISTRATIVE | Facility: OTHER | Age: 76
End: 2021-09-07

## 2021-09-07 ENCOUNTER — TELEPHONE (OUTPATIENT)
Dept: OBSTETRICS AND GYNECOLOGY | Facility: CLINIC | Age: 76
End: 2021-09-07

## 2021-09-07 PROBLEM — A41.9 SEPSIS: Status: RESOLVED | Noted: 2021-09-06 | Resolved: 2021-09-07

## 2021-09-07 PROBLEM — J18.9 LEFT LOWER LOBE PNEUMONIA: Status: RESOLVED | Noted: 2021-09-06 | Resolved: 2021-09-07

## 2021-09-07 PROBLEM — L93.0 LUPUS ERYTHEMATOSUS: Status: RESOLVED | Noted: 2018-01-11 | Resolved: 2021-09-07

## 2021-09-07 PROBLEM — Z51.5 ENCOUNTER FOR PALLIATIVE CARE: Status: ACTIVE | Noted: 2021-09-07

## 2021-09-07 PROBLEM — N39.0 UTI (URINARY TRACT INFECTION): Status: RESOLVED | Noted: 2021-09-06 | Resolved: 2021-09-07

## 2021-09-07 PROBLEM — N18.30 CHRONIC KIDNEY DISEASE, STAGE III (MODERATE): Status: RESOLVED | Noted: 2021-06-11 | Resolved: 2021-09-07

## 2021-09-07 PROBLEM — E78.5 HLD (HYPERLIPIDEMIA): Status: RESOLVED | Noted: 2021-06-11 | Resolved: 2021-09-07

## 2021-09-07 PROBLEM — Z51.5 ENCOUNTER FOR PALLIATIVE CARE: Status: RESOLVED | Noted: 2021-09-07 | Resolved: 2021-09-07

## 2021-09-07 LAB
ALLENS TEST: ABNORMAL
ANION GAP SERPL CALC-SCNC: 8 MMOL/L (ref 8–16)
ANION GAP SERPL CALC-SCNC: 9 MMOL/L (ref 8–16)
BACTERIA UR CULT: NORMAL
BACTERIA UR CULT: NORMAL
BASOPHILS # BLD AUTO: 0.02 K/UL (ref 0–0.2)
BASOPHILS # BLD AUTO: 0.02 K/UL (ref 0–0.2)
BASOPHILS NFR BLD: 0.1 % (ref 0–1.9)
BASOPHILS NFR BLD: 0.1 % (ref 0–1.9)
BUN SERPL-MCNC: 14 MG/DL (ref 8–23)
BUN SERPL-MCNC: 18 MG/DL (ref 8–23)
CALCIUM SERPL-MCNC: 7.9 MG/DL (ref 8.7–10.5)
CALCIUM SERPL-MCNC: 8.1 MG/DL (ref 8.7–10.5)
CHLORIDE SERPL-SCNC: 115 MMOL/L (ref 95–110)
CHLORIDE SERPL-SCNC: 118 MMOL/L (ref 95–110)
CO2 SERPL-SCNC: 14 MMOL/L (ref 23–29)
CO2 SERPL-SCNC: 14 MMOL/L (ref 23–29)
CREAT SERPL-MCNC: 1.4 MG/DL (ref 0.5–1.4)
CREAT SERPL-MCNC: 1.4 MG/DL (ref 0.5–1.4)
DELSYS: ABNORMAL
DIFFERENTIAL METHOD: ABNORMAL
DIFFERENTIAL METHOD: ABNORMAL
EOSINOPHIL # BLD AUTO: 0 K/UL (ref 0–0.5)
EOSINOPHIL # BLD AUTO: 0 K/UL (ref 0–0.5)
EOSINOPHIL NFR BLD: 0 % (ref 0–8)
EOSINOPHIL NFR BLD: 0 % (ref 0–8)
ERYTHROCYTE [DISTWIDTH] IN BLOOD BY AUTOMATED COUNT: 14.8 % (ref 11.5–14.5)
ERYTHROCYTE [DISTWIDTH] IN BLOOD BY AUTOMATED COUNT: 14.8 % (ref 11.5–14.5)
EST. GFR  (AFRICAN AMERICAN): 42 ML/MIN/1.73 M^2
EST. GFR  (AFRICAN AMERICAN): 42 ML/MIN/1.73 M^2
EST. GFR  (NON AFRICAN AMERICAN): 37 ML/MIN/1.73 M^2
EST. GFR  (NON AFRICAN AMERICAN): 37 ML/MIN/1.73 M^2
GLUCOSE SERPL-MCNC: 267 MG/DL (ref 70–110)
GLUCOSE SERPL-MCNC: 290 MG/DL (ref 70–110)
HCO3 UR-SCNC: 12.8 MMOL/L (ref 24–28)
HCT VFR BLD AUTO: 30.7 % (ref 37–48.5)
HCT VFR BLD AUTO: 30.7 % (ref 37–48.5)
HGB BLD-MCNC: 10.2 G/DL (ref 12–16)
HGB BLD-MCNC: 10.2 G/DL (ref 12–16)
IMM GRANULOCYTES # BLD AUTO: 0.18 K/UL (ref 0–0.04)
IMM GRANULOCYTES # BLD AUTO: 0.18 K/UL (ref 0–0.04)
IMM GRANULOCYTES NFR BLD AUTO: 0.8 % (ref 0–0.5)
IMM GRANULOCYTES NFR BLD AUTO: 0.8 % (ref 0–0.5)
LACTATE SERPL-SCNC: 1.1 MMOL/L (ref 0.5–2.2)
LACTATE SERPL-SCNC: 1.1 MMOL/L (ref 0.5–2.2)
LACTATE SERPL-SCNC: 2.2 MMOL/L (ref 0.5–2.2)
LACTATE SERPL-SCNC: 2.3 MMOL/L (ref 0.5–2.2)
LIPASE SERPL-CCNC: 14 U/L (ref 4–60)
LYMPHOCYTES # BLD AUTO: 1.2 K/UL (ref 1–4.8)
LYMPHOCYTES # BLD AUTO: 1.2 K/UL (ref 1–4.8)
LYMPHOCYTES NFR BLD: 5.1 % (ref 18–48)
LYMPHOCYTES NFR BLD: 5.1 % (ref 18–48)
MAGNESIUM SERPL-MCNC: 1.4 MG/DL (ref 1.6–2.6)
MAGNESIUM SERPL-MCNC: 2 MG/DL (ref 1.6–2.6)
MCH RBC QN AUTO: 30.1 PG (ref 27–31)
MCH RBC QN AUTO: 30.1 PG (ref 27–31)
MCHC RBC AUTO-ENTMCNC: 33.2 G/DL (ref 32–36)
MCHC RBC AUTO-ENTMCNC: 33.2 G/DL (ref 32–36)
MCV RBC AUTO: 91 FL (ref 82–98)
MCV RBC AUTO: 91 FL (ref 82–98)
MONOCYTES # BLD AUTO: 2.2 K/UL (ref 0.3–1)
MONOCYTES # BLD AUTO: 2.2 K/UL (ref 0.3–1)
MONOCYTES NFR BLD: 9.2 % (ref 4–15)
MONOCYTES NFR BLD: 9.2 % (ref 4–15)
NEUTROPHILS # BLD AUTO: 19.9 K/UL (ref 1.8–7.7)
NEUTROPHILS # BLD AUTO: 19.9 K/UL (ref 1.8–7.7)
NEUTROPHILS NFR BLD: 84.8 % (ref 38–73)
NEUTROPHILS NFR BLD: 84.8 % (ref 38–73)
NRBC BLD-RTO: 0 /100 WBC
NRBC BLD-RTO: 0 /100 WBC
PCO2 BLDA: 26.7 MMHG (ref 35–45)
PH SMN: 7.29 [PH] (ref 7.35–7.45)
PHOSPHATE SERPL-MCNC: 2.6 MG/DL (ref 2.7–4.5)
PHOSPHATE SERPL-MCNC: <1 MG/DL (ref 2.7–4.5)
PLATELET # BLD AUTO: 211 K/UL (ref 150–450)
PLATELET # BLD AUTO: 211 K/UL (ref 150–450)
PLATELET BLD QL SMEAR: ABNORMAL
PLATELET BLD QL SMEAR: ABNORMAL
PMV BLD AUTO: 10.4 FL (ref 9.2–12.9)
PMV BLD AUTO: 10.4 FL (ref 9.2–12.9)
PO2 BLDA: 35 MMHG (ref 40–60)
POC BE: -14 MMOL/L
POC SATURATED O2: 62 % (ref 95–100)
POC TCO2: 14 MMOL/L (ref 24–29)
POCT GLUCOSE: 189 MG/DL (ref 70–110)
POCT GLUCOSE: 206 MG/DL (ref 70–110)
POCT GLUCOSE: 239 MG/DL (ref 70–110)
POCT GLUCOSE: 240 MG/DL (ref 70–110)
POCT GLUCOSE: 254 MG/DL (ref 70–110)
POCT GLUCOSE: 261 MG/DL (ref 70–110)
POCT GLUCOSE: 269 MG/DL (ref 70–110)
POCT GLUCOSE: 276 MG/DL (ref 70–110)
POCT GLUCOSE: 280 MG/DL (ref 70–110)
POCT GLUCOSE: 297 MG/DL (ref 70–110)
POCT GLUCOSE: 304 MG/DL (ref 70–110)
POCT GLUCOSE: 328 MG/DL (ref 70–110)
POCT GLUCOSE: >500 MG/DL (ref 70–110)
POTASSIUM SERPL-SCNC: 3.4 MMOL/L (ref 3.5–5.1)
POTASSIUM SERPL-SCNC: 4.3 MMOL/L (ref 3.5–5.1)
RBC # BLD AUTO: 3.39 M/UL (ref 4–5.4)
RBC # BLD AUTO: 3.39 M/UL (ref 4–5.4)
SAMPLE: ABNORMAL
SITE: ABNORMAL
SODIUM SERPL-SCNC: 138 MMOL/L (ref 136–145)
SODIUM SERPL-SCNC: 140 MMOL/L (ref 136–145)
VANCOMYCIN SERPL-MCNC: 10.3 UG/ML
WBC # BLD AUTO: 23.48 K/UL (ref 3.9–12.7)
WBC # BLD AUTO: 23.48 K/UL (ref 3.9–12.7)

## 2021-09-07 PROCEDURE — 99497 PR ADVNCD CARE PLAN 30 MIN: ICD-10-PCS | Mod: 25,,, | Performed by: FAMILY MEDICINE

## 2021-09-07 PROCEDURE — 99222 PR INITIAL HOSPITAL CARE,LEVL II: ICD-10-PCS | Mod: ,,, | Performed by: FAMILY MEDICINE

## 2021-09-07 PROCEDURE — 25000003 PHARM REV CODE 250: Performed by: PHYSICIAN ASSISTANT

## 2021-09-07 PROCEDURE — 99222 1ST HOSP IP/OBS MODERATE 55: CPT | Mod: ,,, | Performed by: FAMILY MEDICINE

## 2021-09-07 PROCEDURE — 80048 BASIC METABOLIC PNL TOTAL CA: CPT | Performed by: INTERNAL MEDICINE

## 2021-09-07 PROCEDURE — 85025 COMPLETE CBC W/AUTO DIFF WBC: CPT | Performed by: PHYSICIAN ASSISTANT

## 2021-09-07 PROCEDURE — 63600175 PHARM REV CODE 636 W HCPCS

## 2021-09-07 PROCEDURE — 83735 ASSAY OF MAGNESIUM: CPT | Performed by: PHYSICIAN ASSISTANT

## 2021-09-07 PROCEDURE — 97166 OT EVAL MOD COMPLEX 45 MIN: CPT

## 2021-09-07 PROCEDURE — 25000003 PHARM REV CODE 250

## 2021-09-07 PROCEDURE — 99233 PR SUBSEQUENT HOSPITAL CARE,LEVL III: ICD-10-PCS | Mod: ,,, | Performed by: HOSPITALIST

## 2021-09-07 PROCEDURE — 25000003 PHARM REV CODE 250: Performed by: INTERNAL MEDICINE

## 2021-09-07 PROCEDURE — 94761 N-INVAS EAR/PLS OXIMETRY MLT: CPT

## 2021-09-07 PROCEDURE — 99233 SBSQ HOSP IP/OBS HIGH 50: CPT | Mod: ,,, | Performed by: HOSPITALIST

## 2021-09-07 PROCEDURE — 97162 PT EVAL MOD COMPLEX 30 MIN: CPT

## 2021-09-07 PROCEDURE — 80202 ASSAY OF VANCOMYCIN: CPT | Performed by: INTERNAL MEDICINE

## 2021-09-07 PROCEDURE — 80048 BASIC METABOLIC PNL TOTAL CA: CPT | Mod: 91 | Performed by: HOSPITALIST

## 2021-09-07 PROCEDURE — 63600175 PHARM REV CODE 636 W HCPCS: Performed by: INTERNAL MEDICINE

## 2021-09-07 PROCEDURE — 84100 ASSAY OF PHOSPHORUS: CPT | Performed by: PHYSICIAN ASSISTANT

## 2021-09-07 PROCEDURE — 25000003 PHARM REV CODE 250: Performed by: NURSE PRACTITIONER

## 2021-09-07 PROCEDURE — 83605 ASSAY OF LACTIC ACID: CPT | Mod: 91 | Performed by: HOSPITALIST

## 2021-09-07 PROCEDURE — 99900035 HC TECH TIME PER 15 MIN (STAT)

## 2021-09-07 PROCEDURE — 99497 ADVNCD CARE PLAN 30 MIN: CPT | Mod: 25,,, | Performed by: FAMILY MEDICINE

## 2021-09-07 PROCEDURE — 63600175 PHARM REV CODE 636 W HCPCS: Performed by: STUDENT IN AN ORGANIZED HEALTH CARE EDUCATION/TRAINING PROGRAM

## 2021-09-07 PROCEDURE — C9399 UNCLASSIFIED DRUGS OR BIOLOG: HCPCS | Performed by: HOSPITALIST

## 2021-09-07 PROCEDURE — 83690 ASSAY OF LIPASE: CPT | Performed by: INTERNAL MEDICINE

## 2021-09-07 PROCEDURE — 25000003 PHARM REV CODE 250: Performed by: HOSPITALIST

## 2021-09-07 PROCEDURE — 82803 BLOOD GASES ANY COMBINATION: CPT

## 2021-09-07 PROCEDURE — 93010 EKG 12-LEAD: ICD-10-PCS | Mod: ,,, | Performed by: INTERNAL MEDICINE

## 2021-09-07 PROCEDURE — 63600175 PHARM REV CODE 636 W HCPCS: Performed by: HOSPITALIST

## 2021-09-07 PROCEDURE — 99233 PR SUBSEQUENT HOSPITAL CARE,LEVL III: ICD-10-PCS | Mod: ,,, | Performed by: INTERNAL MEDICINE

## 2021-09-07 PROCEDURE — A4216 STERILE WATER/SALINE, 10 ML: HCPCS | Performed by: INTERNAL MEDICINE

## 2021-09-07 PROCEDURE — 99233 SBSQ HOSP IP/OBS HIGH 50: CPT | Mod: ,,, | Performed by: INTERNAL MEDICINE

## 2021-09-07 PROCEDURE — 93010 ELECTROCARDIOGRAM REPORT: CPT | Mod: ,,, | Performed by: INTERNAL MEDICINE

## 2021-09-07 PROCEDURE — 84100 ASSAY OF PHOSPHORUS: CPT | Mod: 91 | Performed by: HOSPITALIST

## 2021-09-07 PROCEDURE — 93005 ELECTROCARDIOGRAM TRACING: CPT

## 2021-09-07 PROCEDURE — 20000000 HC ICU ROOM

## 2021-09-07 PROCEDURE — 83735 ASSAY OF MAGNESIUM: CPT | Mod: 91 | Performed by: HOSPITALIST

## 2021-09-07 PROCEDURE — 97535 SELF CARE MNGMENT TRAINING: CPT

## 2021-09-07 PROCEDURE — 83605 ASSAY OF LACTIC ACID: CPT | Performed by: PHYSICIAN ASSISTANT

## 2021-09-07 RX ORDER — INSULIN ASPART 100 [IU]/ML
1-10 INJECTION, SOLUTION INTRAVENOUS; SUBCUTANEOUS
Status: DISCONTINUED | OUTPATIENT
Start: 2021-09-07 | End: 2021-09-12 | Stop reason: HOSPADM

## 2021-09-07 RX ORDER — MAGNESIUM SULFATE HEPTAHYDRATE 40 MG/ML
2 INJECTION, SOLUTION INTRAVENOUS ONCE
Status: COMPLETED | OUTPATIENT
Start: 2021-09-07 | End: 2021-09-07

## 2021-09-07 RX ORDER — GUAIFENESIN 100 MG/5ML
200 SOLUTION ORAL EVERY 4 HOURS PRN
Status: DISCONTINUED | OUTPATIENT
Start: 2021-09-07 | End: 2021-09-12 | Stop reason: HOSPADM

## 2021-09-07 RX ORDER — GLUCAGON 1 MG
1 KIT INJECTION
Status: DISCONTINUED | OUTPATIENT
Start: 2021-09-07 | End: 2021-09-12 | Stop reason: HOSPADM

## 2021-09-07 RX ORDER — ACETAMINOPHEN 500 MG
1000 TABLET ORAL EVERY 8 HOURS PRN
Status: DISCONTINUED | OUTPATIENT
Start: 2021-09-07 | End: 2021-09-12 | Stop reason: HOSPADM

## 2021-09-07 RX ORDER — SODIUM CHLORIDE AND POTASSIUM CHLORIDE 150; 450 MG/100ML; MG/100ML
INJECTION, SOLUTION INTRAVENOUS CONTINUOUS
Status: DISCONTINUED | OUTPATIENT
Start: 2021-09-07 | End: 2021-09-07

## 2021-09-07 RX ORDER — IBUPROFEN 200 MG
16 TABLET ORAL
Status: DISCONTINUED | OUTPATIENT
Start: 2021-09-07 | End: 2021-09-12 | Stop reason: HOSPADM

## 2021-09-07 RX ORDER — OXYCODONE HYDROCHLORIDE 5 MG/1
5 TABLET ORAL EVERY 6 HOURS PRN
Status: DISCONTINUED | OUTPATIENT
Start: 2021-09-07 | End: 2021-09-12 | Stop reason: HOSPADM

## 2021-09-07 RX ORDER — INSULIN ASPART 100 [IU]/ML
5 INJECTION, SOLUTION INTRAVENOUS; SUBCUTANEOUS
Status: DISCONTINUED | OUTPATIENT
Start: 2021-09-07 | End: 2021-09-10

## 2021-09-07 RX ORDER — IBUPROFEN 200 MG
24 TABLET ORAL
Status: DISCONTINUED | OUTPATIENT
Start: 2021-09-07 | End: 2021-09-12 | Stop reason: HOSPADM

## 2021-09-07 RX ORDER — POTASSIUM CHLORIDE 20 MEQ/1
40 TABLET, EXTENDED RELEASE ORAL ONCE
Status: COMPLETED | OUTPATIENT
Start: 2021-09-07 | End: 2021-09-07

## 2021-09-07 RX ORDER — ENOXAPARIN SODIUM 100 MG/ML
40 INJECTION SUBCUTANEOUS EVERY 24 HOURS
Status: DISCONTINUED | OUTPATIENT
Start: 2021-09-07 | End: 2021-09-12 | Stop reason: HOSPADM

## 2021-09-07 RX ORDER — HYDROCODONE BITARTRATE AND ACETAMINOPHEN 10; 325 MG/1; MG/1
1 TABLET ORAL EVERY 8 HOURS PRN
Status: DISCONTINUED | OUTPATIENT
Start: 2021-09-07 | End: 2021-09-07

## 2021-09-07 RX ADMIN — INSULIN ASPART 2 UNITS: 100 INJECTION, SOLUTION INTRAVENOUS; SUBCUTANEOUS at 09:09

## 2021-09-07 RX ADMIN — GUAIFENESIN 200 MG: 100 SOLUTION ORAL at 11:09

## 2021-09-07 RX ADMIN — SODIUM CHLORIDE, PRESERVATIVE FREE 10 ML: 5 INJECTION INTRAVENOUS at 11:09

## 2021-09-07 RX ADMIN — POTASSIUM CHLORIDE 40 MEQ: 1500 TABLET, EXTENDED RELEASE ORAL at 11:09

## 2021-09-07 RX ADMIN — OXYCODONE 5 MG: 5 TABLET ORAL at 09:09

## 2021-09-07 RX ADMIN — Medication 6 MG: at 09:09

## 2021-09-07 RX ADMIN — SERTRALINE HYDROCHLORIDE 100 MG: 100 TABLET ORAL at 08:09

## 2021-09-07 RX ADMIN — INSULIN ASPART 5 UNITS: 100 INJECTION, SOLUTION INTRAVENOUS; SUBCUTANEOUS at 04:09

## 2021-09-07 RX ADMIN — DEXTROSE, SODIUM CHLORIDE, AND POTASSIUM CHLORIDE: 5; .45; .15 INJECTION INTRAVENOUS at 03:09

## 2021-09-07 RX ADMIN — INSULIN ASPART 4 UNITS: 100 INJECTION, SOLUTION INTRAVENOUS; SUBCUTANEOUS at 05:09

## 2021-09-07 RX ADMIN — ENOXAPARIN SODIUM 40 MG: 40 INJECTION SUBCUTANEOUS at 11:09

## 2021-09-07 RX ADMIN — INSULIN DETEMIR 15 UNITS: 100 INJECTION, SOLUTION SUBCUTANEOUS at 09:09

## 2021-09-07 RX ADMIN — MUPIROCIN: 20 OINTMENT TOPICAL at 08:09

## 2021-09-07 RX ADMIN — CEFEPIME HYDROCHLORIDE 2 G: 2 INJECTION, SOLUTION INTRAVENOUS at 01:09

## 2021-09-07 RX ADMIN — INSULIN DETEMIR 15 UNITS: 100 INJECTION, SOLUTION SUBCUTANEOUS at 10:09

## 2021-09-07 RX ADMIN — INSULIN ASPART 5 UNITS: 100 INJECTION, SOLUTION INTRAVENOUS; SUBCUTANEOUS at 11:09

## 2021-09-07 RX ADMIN — AZITHROMYCIN 500 MG: 500 INJECTION, POWDER, LYOPHILIZED, FOR SOLUTION INTRAVENOUS at 11:09

## 2021-09-07 RX ADMIN — CEFTRIAXONE 1 G: 1 INJECTION, SOLUTION INTRAVENOUS at 11:09

## 2021-09-07 RX ADMIN — PREGABALIN 75 MG: 75 CAPSULE ORAL at 08:09

## 2021-09-07 RX ADMIN — PREGABALIN 75 MG: 75 CAPSULE ORAL at 02:09

## 2021-09-07 RX ADMIN — SODIUM CHLORIDE, PRESERVATIVE FREE 10 ML: 5 INJECTION INTRAVENOUS at 06:09

## 2021-09-07 RX ADMIN — INSULIN HUMAN 2.56 UNITS/HR: 1 INJECTION, SOLUTION INTRAVENOUS at 07:09

## 2021-09-07 RX ADMIN — PREGABALIN 75 MG: 75 CAPSULE ORAL at 09:09

## 2021-09-07 RX ADMIN — SODIUM PHOSPHATE, MONOBASIC, MONOHYDRATE 39.99 MMOL: 276; 142 INJECTION, SOLUTION INTRAVENOUS at 06:09

## 2021-09-07 RX ADMIN — AMLODIPINE BESYLATE 5 MG: 5 TABLET ORAL at 08:09

## 2021-09-07 RX ADMIN — MAGNESIUM SULFATE HEPTAHYDRATE 2 G: 40 INJECTION, SOLUTION INTRAVENOUS at 10:09

## 2021-09-07 RX ADMIN — CLONIDINE HYDROCHLORIDE 0.1 MG: 0.1 TABLET ORAL at 08:09

## 2021-09-07 RX ADMIN — PANTOPRAZOLE SODIUM 40 MG: 40 TABLET, DELAYED RELEASE ORAL at 08:09

## 2021-09-07 RX ADMIN — VANCOMYCIN HYDROCHLORIDE 1500 MG: 1.5 INJECTION, POWDER, LYOPHILIZED, FOR SOLUTION INTRAVENOUS at 08:09

## 2021-09-07 RX ADMIN — ASPIRIN 81 MG: 81 TABLET, COATED ORAL at 08:09

## 2021-09-07 RX ADMIN — OXYCODONE 5 MG: 5 TABLET ORAL at 02:09

## 2021-09-07 RX ADMIN — ATORVASTATIN CALCIUM 40 MG: 20 TABLET, FILM COATED ORAL at 08:09

## 2021-09-07 RX ADMIN — SODIUM CHLORIDE, PRESERVATIVE FREE 10 ML: 5 INJECTION INTRAVENOUS at 07:09

## 2021-09-07 RX ADMIN — HYDROCODONE BITARTRATE AND ACETAMINOPHEN 1 TABLET: 10; 325 TABLET ORAL at 03:09

## 2021-09-08 LAB
ANION GAP SERPL CALC-SCNC: 8 MMOL/L (ref 8–16)
BASOPHILS # BLD AUTO: 0.02 K/UL (ref 0–0.2)
BASOPHILS NFR BLD: 0.1 % (ref 0–1.9)
BUN SERPL-MCNC: 13 MG/DL (ref 8–23)
CALCIUM SERPL-MCNC: 8.2 MG/DL (ref 8.7–10.5)
CHLORIDE SERPL-SCNC: 117 MMOL/L (ref 95–110)
CO2 SERPL-SCNC: 16 MMOL/L (ref 23–29)
CREAT SERPL-MCNC: 1.2 MG/DL (ref 0.5–1.4)
DIFFERENTIAL METHOD: ABNORMAL
EOSINOPHIL # BLD AUTO: 0 K/UL (ref 0–0.5)
EOSINOPHIL NFR BLD: 0.1 % (ref 0–8)
ERYTHROCYTE [DISTWIDTH] IN BLOOD BY AUTOMATED COUNT: 15.6 % (ref 11.5–14.5)
EST. GFR  (AFRICAN AMERICAN): 51 ML/MIN/1.73 M^2
EST. GFR  (NON AFRICAN AMERICAN): 44 ML/MIN/1.73 M^2
GLUCOSE SERPL-MCNC: 151 MG/DL (ref 70–110)
HCT VFR BLD AUTO: 31.1 % (ref 37–48.5)
HGB BLD-MCNC: 10.3 G/DL (ref 12–16)
IMM GRANULOCYTES # BLD AUTO: 0.07 K/UL (ref 0–0.04)
IMM GRANULOCYTES NFR BLD AUTO: 0.4 % (ref 0–0.5)
LYMPHOCYTES # BLD AUTO: 2.7 K/UL (ref 1–4.8)
LYMPHOCYTES NFR BLD: 15.8 % (ref 18–48)
MAGNESIUM SERPL-MCNC: 2.1 MG/DL (ref 1.6–2.6)
MCH RBC QN AUTO: 29.8 PG (ref 27–31)
MCHC RBC AUTO-ENTMCNC: 33.1 G/DL (ref 32–36)
MCV RBC AUTO: 90 FL (ref 82–98)
MONOCYTES # BLD AUTO: 1 K/UL (ref 0.3–1)
MONOCYTES NFR BLD: 5.7 % (ref 4–15)
NEUTROPHILS # BLD AUTO: 13.4 K/UL (ref 1.8–7.7)
NEUTROPHILS NFR BLD: 77.9 % (ref 38–73)
NRBC BLD-RTO: 0 /100 WBC
PLATELET # BLD AUTO: 177 K/UL (ref 150–450)
PMV BLD AUTO: 11.2 FL (ref 9.2–12.9)
POCT GLUCOSE: 130 MG/DL (ref 70–110)
POCT GLUCOSE: 168 MG/DL (ref 70–110)
POCT GLUCOSE: 194 MG/DL (ref 70–110)
POCT GLUCOSE: 210 MG/DL (ref 70–110)
POCT GLUCOSE: 247 MG/DL (ref 70–110)
POCT GLUCOSE: 249 MG/DL (ref 70–110)
POTASSIUM SERPL-SCNC: 4 MMOL/L (ref 3.5–5.1)
RBC # BLD AUTO: 3.46 M/UL (ref 4–5.4)
SODIUM SERPL-SCNC: 141 MMOL/L (ref 136–145)
WBC # BLD AUTO: 17.14 K/UL (ref 3.9–12.7)

## 2021-09-08 PROCEDURE — 25000003 PHARM REV CODE 250: Performed by: PHYSICIAN ASSISTANT

## 2021-09-08 PROCEDURE — 25000003 PHARM REV CODE 250: Performed by: INTERNAL MEDICINE

## 2021-09-08 PROCEDURE — A4216 STERILE WATER/SALINE, 10 ML: HCPCS | Performed by: INTERNAL MEDICINE

## 2021-09-08 PROCEDURE — 94761 N-INVAS EAR/PLS OXIMETRY MLT: CPT

## 2021-09-08 PROCEDURE — 25000003 PHARM REV CODE 250: Performed by: HOSPITALIST

## 2021-09-08 PROCEDURE — 94640 AIRWAY INHALATION TREATMENT: CPT

## 2021-09-08 PROCEDURE — 99233 SBSQ HOSP IP/OBS HIGH 50: CPT | Mod: ,,, | Performed by: HOSPITALIST

## 2021-09-08 PROCEDURE — 11000001 HC ACUTE MED/SURG PRIVATE ROOM

## 2021-09-08 PROCEDURE — 97530 THERAPEUTIC ACTIVITIES: CPT | Mod: CQ

## 2021-09-08 PROCEDURE — 63600175 PHARM REV CODE 636 W HCPCS: Performed by: HOSPITALIST

## 2021-09-08 PROCEDURE — 99233 PR SUBSEQUENT HOSPITAL CARE,LEVL III: ICD-10-PCS | Mod: ,,, | Performed by: HOSPITALIST

## 2021-09-08 PROCEDURE — 97535 SELF CARE MNGMENT TRAINING: CPT

## 2021-09-08 PROCEDURE — 83735 ASSAY OF MAGNESIUM: CPT | Performed by: PHYSICIAN ASSISTANT

## 2021-09-08 PROCEDURE — 85025 COMPLETE CBC W/AUTO DIFF WBC: CPT | Performed by: HOSPITALIST

## 2021-09-08 PROCEDURE — 92610 EVALUATE SWALLOWING FUNCTION: CPT

## 2021-09-08 PROCEDURE — 25000242 PHARM REV CODE 250 ALT 637 W/ HCPCS: Performed by: HOSPITALIST

## 2021-09-08 PROCEDURE — 80048 BASIC METABOLIC PNL TOTAL CA: CPT | Performed by: HOSPITALIST

## 2021-09-08 RX ORDER — IPRATROPIUM BROMIDE AND ALBUTEROL SULFATE 2.5; .5 MG/3ML; MG/3ML
3 SOLUTION RESPIRATORY (INHALATION) EVERY 6 HOURS PRN
Status: DISCONTINUED | OUTPATIENT
Start: 2021-09-08 | End: 2021-09-12 | Stop reason: HOSPADM

## 2021-09-08 RX ORDER — IPRATROPIUM BROMIDE AND ALBUTEROL SULFATE 2.5; .5 MG/3ML; MG/3ML
3 SOLUTION RESPIRATORY (INHALATION) ONCE
Status: COMPLETED | OUTPATIENT
Start: 2021-09-08 | End: 2021-09-08

## 2021-09-08 RX ADMIN — ASPIRIN 81 MG: 81 TABLET, COATED ORAL at 08:09

## 2021-09-08 RX ADMIN — MUPIROCIN: 20 OINTMENT TOPICAL at 08:09

## 2021-09-08 RX ADMIN — OXYCODONE 5 MG: 5 TABLET ORAL at 09:09

## 2021-09-08 RX ADMIN — SODIUM CHLORIDE, PRESERVATIVE FREE 10 ML: 5 INJECTION INTRAVENOUS at 06:09

## 2021-09-08 RX ADMIN — INSULIN DETEMIR 15 UNITS: 100 INJECTION, SOLUTION SUBCUTANEOUS at 08:09

## 2021-09-08 RX ADMIN — PREGABALIN 75 MG: 75 CAPSULE ORAL at 03:09

## 2021-09-08 RX ADMIN — INSULIN ASPART 2 UNITS: 100 INJECTION, SOLUTION INTRAVENOUS; SUBCUTANEOUS at 04:09

## 2021-09-08 RX ADMIN — Medication 6 MG: at 09:09

## 2021-09-08 RX ADMIN — PREGABALIN 75 MG: 75 CAPSULE ORAL at 08:09

## 2021-09-08 RX ADMIN — ATORVASTATIN CALCIUM 40 MG: 20 TABLET, FILM COATED ORAL at 08:09

## 2021-09-08 RX ADMIN — INSULIN ASPART 5 UNITS: 100 INJECTION, SOLUTION INTRAVENOUS; SUBCUTANEOUS at 04:09

## 2021-09-08 RX ADMIN — OXYCODONE 5 MG: 5 TABLET ORAL at 01:09

## 2021-09-08 RX ADMIN — AZITHROMYCIN 500 MG: 500 INJECTION, POWDER, LYOPHILIZED, FOR SOLUTION INTRAVENOUS at 12:09

## 2021-09-08 RX ADMIN — IPRATROPIUM BROMIDE AND ALBUTEROL SULFATE 3 ML: .5; 2.5 SOLUTION RESPIRATORY (INHALATION) at 04:09

## 2021-09-08 RX ADMIN — SODIUM CHLORIDE, PRESERVATIVE FREE 10 ML: 5 INJECTION INTRAVENOUS at 12:09

## 2021-09-08 RX ADMIN — OXYCODONE 5 MG: 5 TABLET ORAL at 03:09

## 2021-09-08 RX ADMIN — INSULIN ASPART 5 UNITS: 100 INJECTION, SOLUTION INTRAVENOUS; SUBCUTANEOUS at 09:09

## 2021-09-08 RX ADMIN — CEFTRIAXONE 1 G: 1 INJECTION, SOLUTION INTRAVENOUS at 12:09

## 2021-09-08 RX ADMIN — AMLODIPINE BESYLATE 5 MG: 5 TABLET ORAL at 08:09

## 2021-09-08 RX ADMIN — INSULIN ASPART 5 UNITS: 100 INJECTION, SOLUTION INTRAVENOUS; SUBCUTANEOUS at 12:09

## 2021-09-08 RX ADMIN — SERTRALINE HYDROCHLORIDE 100 MG: 100 TABLET ORAL at 08:09

## 2021-09-08 RX ADMIN — PANTOPRAZOLE SODIUM 40 MG: 40 TABLET, DELAYED RELEASE ORAL at 08:09

## 2021-09-08 RX ADMIN — INSULIN ASPART 4 UNITS: 100 INJECTION, SOLUTION INTRAVENOUS; SUBCUTANEOUS at 12:09

## 2021-09-08 RX ADMIN — ENOXAPARIN SODIUM 40 MG: 40 INJECTION SUBCUTANEOUS at 04:09

## 2021-09-09 LAB
ANION GAP SERPL CALC-SCNC: 8 MMOL/L (ref 8–16)
BACTERIA BLD CULT: ABNORMAL
BASOPHILS # BLD AUTO: 0.03 K/UL (ref 0–0.2)
BASOPHILS NFR BLD: 0.3 % (ref 0–1.9)
BUN SERPL-MCNC: 12 MG/DL (ref 8–23)
CALCIUM SERPL-MCNC: 8.5 MG/DL (ref 8.7–10.5)
CHLORIDE SERPL-SCNC: 118 MMOL/L (ref 95–110)
CO2 SERPL-SCNC: 20 MMOL/L (ref 23–29)
CREAT SERPL-MCNC: 0.9 MG/DL (ref 0.5–1.4)
DIFFERENTIAL METHOD: ABNORMAL
EOSINOPHIL # BLD AUTO: 0.2 K/UL (ref 0–0.5)
EOSINOPHIL NFR BLD: 1.8 % (ref 0–8)
ERYTHROCYTE [DISTWIDTH] IN BLOOD BY AUTOMATED COUNT: 15.5 % (ref 11.5–14.5)
EST. GFR  (AFRICAN AMERICAN): >60 ML/MIN/1.73 M^2
EST. GFR  (NON AFRICAN AMERICAN): >60 ML/MIN/1.73 M^2
GLUCOSE SERPL-MCNC: 108 MG/DL (ref 70–110)
HCT VFR BLD AUTO: 30.8 % (ref 37–48.5)
HGB BLD-MCNC: 10.3 G/DL (ref 12–16)
IMM GRANULOCYTES # BLD AUTO: 0.04 K/UL (ref 0–0.04)
IMM GRANULOCYTES NFR BLD AUTO: 0.4 % (ref 0–0.5)
LYMPHOCYTES # BLD AUTO: 2.5 K/UL (ref 1–4.8)
LYMPHOCYTES NFR BLD: 25.4 % (ref 18–48)
MAGNESIUM SERPL-MCNC: 2.3 MG/DL (ref 1.6–2.6)
MCH RBC QN AUTO: 30.1 PG (ref 27–31)
MCHC RBC AUTO-ENTMCNC: 33.4 G/DL (ref 32–36)
MCV RBC AUTO: 90 FL (ref 82–98)
MONOCYTES # BLD AUTO: 0.7 K/UL (ref 0.3–1)
MONOCYTES NFR BLD: 6.6 % (ref 4–15)
NEUTROPHILS # BLD AUTO: 6.4 K/UL (ref 1.8–7.7)
NEUTROPHILS NFR BLD: 65.5 % (ref 38–73)
NRBC BLD-RTO: 0 /100 WBC
PHOSPHATE SERPL-MCNC: 2.9 MG/DL (ref 2.7–4.5)
PLATELET # BLD AUTO: 181 K/UL (ref 150–450)
PMV BLD AUTO: 10.5 FL (ref 9.2–12.9)
POCT GLUCOSE: 146 MG/DL (ref 70–110)
POCT GLUCOSE: 161 MG/DL (ref 70–110)
POCT GLUCOSE: 213 MG/DL (ref 70–110)
POCT GLUCOSE: 79 MG/DL (ref 70–110)
POTASSIUM SERPL-SCNC: 3.7 MMOL/L (ref 3.5–5.1)
RBC # BLD AUTO: 3.42 M/UL (ref 4–5.4)
SODIUM SERPL-SCNC: 146 MMOL/L (ref 136–145)
WBC # BLD AUTO: 9.84 K/UL (ref 3.9–12.7)

## 2021-09-09 PROCEDURE — 11000001 HC ACUTE MED/SURG PRIVATE ROOM

## 2021-09-09 PROCEDURE — 84100 ASSAY OF PHOSPHORUS: CPT | Performed by: HOSPITALIST

## 2021-09-09 PROCEDURE — 85025 COMPLETE CBC W/AUTO DIFF WBC: CPT | Performed by: PHYSICIAN ASSISTANT

## 2021-09-09 PROCEDURE — 92611 MOTION FLUOROSCOPY/SWALLOW: CPT

## 2021-09-09 PROCEDURE — 99233 SBSQ HOSP IP/OBS HIGH 50: CPT | Mod: ,,, | Performed by: HOSPITALIST

## 2021-09-09 PROCEDURE — 25000003 PHARM REV CODE 250: Performed by: HOSPITALIST

## 2021-09-09 PROCEDURE — 80048 BASIC METABOLIC PNL TOTAL CA: CPT | Performed by: HOSPITALIST

## 2021-09-09 PROCEDURE — 94761 N-INVAS EAR/PLS OXIMETRY MLT: CPT

## 2021-09-09 PROCEDURE — 97116 GAIT TRAINING THERAPY: CPT | Mod: CQ

## 2021-09-09 PROCEDURE — 25000003 PHARM REV CODE 250: Performed by: INTERNAL MEDICINE

## 2021-09-09 PROCEDURE — 83735 ASSAY OF MAGNESIUM: CPT | Performed by: PHYSICIAN ASSISTANT

## 2021-09-09 PROCEDURE — 97530 THERAPEUTIC ACTIVITIES: CPT | Mod: CQ

## 2021-09-09 PROCEDURE — 99233 PR SUBSEQUENT HOSPITAL CARE,LEVL III: ICD-10-PCS | Mod: ,,, | Performed by: HOSPITALIST

## 2021-09-09 PROCEDURE — 25500020 PHARM REV CODE 255: Performed by: HOSPITALIST

## 2021-09-09 PROCEDURE — 99232 PR SUBSEQUENT HOSPITAL CARE,LEVL II: ICD-10-PCS | Mod: ,,, | Performed by: INTERNAL MEDICINE

## 2021-09-09 PROCEDURE — 63600175 PHARM REV CODE 636 W HCPCS: Performed by: HOSPITALIST

## 2021-09-09 PROCEDURE — A9698 NON-RAD CONTRAST MATERIALNOC: HCPCS | Performed by: HOSPITALIST

## 2021-09-09 PROCEDURE — 99232 SBSQ HOSP IP/OBS MODERATE 35: CPT | Mod: ,,, | Performed by: INTERNAL MEDICINE

## 2021-09-09 PROCEDURE — 25000003 PHARM REV CODE 250: Performed by: PHYSICIAN ASSISTANT

## 2021-09-09 PROCEDURE — A4216 STERILE WATER/SALINE, 10 ML: HCPCS | Performed by: INTERNAL MEDICINE

## 2021-09-09 RX ORDER — HYDROCORTISONE 25 MG/G
CREAM TOPICAL 2 TIMES DAILY
Status: DISCONTINUED | OUTPATIENT
Start: 2021-09-09 | End: 2021-09-12 | Stop reason: HOSPADM

## 2021-09-09 RX ADMIN — OXYCODONE 5 MG: 5 TABLET ORAL at 06:09

## 2021-09-09 RX ADMIN — INSULIN ASPART 4 UNITS: 100 INJECTION, SOLUTION INTRAVENOUS; SUBCUTANEOUS at 12:09

## 2021-09-09 RX ADMIN — INSULIN ASPART 5 UNITS: 100 INJECTION, SOLUTION INTRAVENOUS; SUBCUTANEOUS at 05:09

## 2021-09-09 RX ADMIN — BARIUM SULFATE 50 ML: 0.81 POWDER, FOR SUSPENSION ORAL at 01:09

## 2021-09-09 RX ADMIN — MUPIROCIN: 20 OINTMENT TOPICAL at 08:09

## 2021-09-09 RX ADMIN — OXYCODONE 5 MG: 5 TABLET ORAL at 08:09

## 2021-09-09 RX ADMIN — PREGABALIN 75 MG: 75 CAPSULE ORAL at 08:09

## 2021-09-09 RX ADMIN — SODIUM CHLORIDE, PRESERVATIVE FREE 10 ML: 5 INJECTION INTRAVENOUS at 12:09

## 2021-09-09 RX ADMIN — AMLODIPINE BESYLATE 5 MG: 5 TABLET ORAL at 08:09

## 2021-09-09 RX ADMIN — INSULIN ASPART 5 UNITS: 100 INJECTION, SOLUTION INTRAVENOUS; SUBCUTANEOUS at 12:09

## 2021-09-09 RX ADMIN — SERTRALINE HYDROCHLORIDE 100 MG: 100 TABLET ORAL at 08:09

## 2021-09-09 RX ADMIN — SODIUM CHLORIDE, PRESERVATIVE FREE 10 ML: 5 INJECTION INTRAVENOUS at 05:09

## 2021-09-09 RX ADMIN — INSULIN DETEMIR 15 UNITS: 100 INJECTION, SOLUTION SUBCUTANEOUS at 08:09

## 2021-09-09 RX ADMIN — INSULIN ASPART 5 UNITS: 100 INJECTION, SOLUTION INTRAVENOUS; SUBCUTANEOUS at 08:09

## 2021-09-09 RX ADMIN — PREGABALIN 75 MG: 75 CAPSULE ORAL at 02:09

## 2021-09-09 RX ADMIN — CEFTRIAXONE 1 G: 1 INJECTION, SOLUTION INTRAVENOUS at 12:09

## 2021-09-09 RX ADMIN — ATORVASTATIN CALCIUM 40 MG: 20 TABLET, FILM COATED ORAL at 08:09

## 2021-09-09 RX ADMIN — SODIUM CHLORIDE, PRESERVATIVE FREE 10 ML: 5 INJECTION INTRAVENOUS at 06:09

## 2021-09-09 RX ADMIN — AZITHROMYCIN 500 MG: 500 INJECTION, POWDER, LYOPHILIZED, FOR SOLUTION INTRAVENOUS at 02:09

## 2021-09-09 RX ADMIN — PANTOPRAZOLE SODIUM 40 MG: 40 TABLET, DELAYED RELEASE ORAL at 08:09

## 2021-09-09 RX ADMIN — ASPIRIN 81 MG: 81 TABLET, COATED ORAL at 08:09

## 2021-09-09 RX ADMIN — ENOXAPARIN SODIUM 40 MG: 40 INJECTION SUBCUTANEOUS at 05:09

## 2021-09-10 ENCOUNTER — PATIENT OUTREACH (OUTPATIENT)
Dept: ADMINISTRATIVE | Facility: OTHER | Age: 76
End: 2021-09-10

## 2021-09-10 PROBLEM — I10 ESSENTIAL HYPERTENSION: Status: ACTIVE | Noted: 2021-09-10

## 2021-09-10 LAB
ANION GAP SERPL CALC-SCNC: 10 MMOL/L (ref 8–16)
BASOPHILS # BLD AUTO: 0.02 K/UL (ref 0–0.2)
BASOPHILS NFR BLD: 0.2 % (ref 0–1.9)
BUN SERPL-MCNC: 8 MG/DL (ref 8–23)
CALCIUM SERPL-MCNC: 8.3 MG/DL (ref 8.7–10.5)
CHLORIDE SERPL-SCNC: 113 MMOL/L (ref 95–110)
CO2 SERPL-SCNC: 21 MMOL/L (ref 23–29)
CREAT SERPL-MCNC: 0.9 MG/DL (ref 0.5–1.4)
DIFFERENTIAL METHOD: ABNORMAL
EOSINOPHIL # BLD AUTO: 0.2 K/UL (ref 0–0.5)
EOSINOPHIL NFR BLD: 2.4 % (ref 0–8)
ERYTHROCYTE [DISTWIDTH] IN BLOOD BY AUTOMATED COUNT: 15.1 % (ref 11.5–14.5)
EST. GFR  (AFRICAN AMERICAN): >60 ML/MIN/1.73 M^2
EST. GFR  (NON AFRICAN AMERICAN): >60 ML/MIN/1.73 M^2
GLUCOSE SERPL-MCNC: 231 MG/DL (ref 70–110)
HCT VFR BLD AUTO: 31.7 % (ref 37–48.5)
HGB BLD-MCNC: 10.5 G/DL (ref 12–16)
IMM GRANULOCYTES # BLD AUTO: 0.03 K/UL (ref 0–0.04)
IMM GRANULOCYTES NFR BLD AUTO: 0.3 % (ref 0–0.5)
LYMPHOCYTES # BLD AUTO: 2.2 K/UL (ref 1–4.8)
LYMPHOCYTES NFR BLD: 23.4 % (ref 18–48)
MAGNESIUM SERPL-MCNC: 2 MG/DL (ref 1.6–2.6)
MCH RBC QN AUTO: 29.5 PG (ref 27–31)
MCHC RBC AUTO-ENTMCNC: 33.1 G/DL (ref 32–36)
MCV RBC AUTO: 89 FL (ref 82–98)
MONOCYTES # BLD AUTO: 0.7 K/UL (ref 0.3–1)
MONOCYTES NFR BLD: 7.4 % (ref 4–15)
NEUTROPHILS # BLD AUTO: 6.3 K/UL (ref 1.8–7.7)
NEUTROPHILS NFR BLD: 66.3 % (ref 38–73)
NRBC BLD-RTO: 0 /100 WBC
PHOSPHATE SERPL-MCNC: 2.8 MG/DL (ref 2.7–4.5)
PLATELET # BLD AUTO: 213 K/UL (ref 150–450)
PMV BLD AUTO: 10.4 FL (ref 9.2–12.9)
POCT GLUCOSE: 209 MG/DL (ref 70–110)
POCT GLUCOSE: 362 MG/DL (ref 70–110)
POCT GLUCOSE: 400 MG/DL (ref 70–110)
POCT GLUCOSE: 99 MG/DL (ref 70–110)
POTASSIUM SERPL-SCNC: 3.6 MMOL/L (ref 3.5–5.1)
RBC # BLD AUTO: 3.56 M/UL (ref 4–5.4)
SODIUM SERPL-SCNC: 144 MMOL/L (ref 136–145)
WBC # BLD AUTO: 9.57 K/UL (ref 3.9–12.7)

## 2021-09-10 PROCEDURE — A4216 STERILE WATER/SALINE, 10 ML: HCPCS | Performed by: INTERNAL MEDICINE

## 2021-09-10 PROCEDURE — 80048 BASIC METABOLIC PNL TOTAL CA: CPT | Performed by: HOSPITALIST

## 2021-09-10 PROCEDURE — 99233 SBSQ HOSP IP/OBS HIGH 50: CPT | Mod: ,,, | Performed by: HOSPITALIST

## 2021-09-10 PROCEDURE — 97535 SELF CARE MNGMENT TRAINING: CPT

## 2021-09-10 PROCEDURE — 99233 PR SUBSEQUENT HOSPITAL CARE,LEVL III: ICD-10-PCS | Mod: ,,, | Performed by: HOSPITALIST

## 2021-09-10 PROCEDURE — 25000003 PHARM REV CODE 250: Performed by: INTERNAL MEDICINE

## 2021-09-10 PROCEDURE — 25000003 PHARM REV CODE 250: Performed by: PHYSICIAN ASSISTANT

## 2021-09-10 PROCEDURE — 94761 N-INVAS EAR/PLS OXIMETRY MLT: CPT

## 2021-09-10 PROCEDURE — 25000003 PHARM REV CODE 250: Performed by: HOSPITALIST

## 2021-09-10 PROCEDURE — 97116 GAIT TRAINING THERAPY: CPT | Mod: CQ

## 2021-09-10 PROCEDURE — 63600175 PHARM REV CODE 636 W HCPCS: Performed by: HOSPITALIST

## 2021-09-10 PROCEDURE — 87324 CLOSTRIDIUM AG IA: CPT | Performed by: HOSPITALIST

## 2021-09-10 PROCEDURE — 84100 ASSAY OF PHOSPHORUS: CPT | Performed by: HOSPITALIST

## 2021-09-10 PROCEDURE — 11000001 HC ACUTE MED/SURG PRIVATE ROOM

## 2021-09-10 PROCEDURE — 27000207 HC ISOLATION

## 2021-09-10 PROCEDURE — 87449 NOS EACH ORGANISM AG IA: CPT | Performed by: HOSPITALIST

## 2021-09-10 PROCEDURE — 83735 ASSAY OF MAGNESIUM: CPT | Performed by: PHYSICIAN ASSISTANT

## 2021-09-10 PROCEDURE — 85025 COMPLETE CBC W/AUTO DIFF WBC: CPT | Performed by: PHYSICIAN ASSISTANT

## 2021-09-10 PROCEDURE — 97110 THERAPEUTIC EXERCISES: CPT | Mod: CQ

## 2021-09-10 RX ORDER — PANTOPRAZOLE SODIUM 40 MG/1
40 TABLET, DELAYED RELEASE ORAL DAILY
Qty: 30 TABLET | Refills: 0 | Status: SHIPPED | OUTPATIENT
Start: 2021-09-11

## 2021-09-10 RX ORDER — ASPIRIN 81 MG/1
81 TABLET ORAL DAILY
Qty: 30 TABLET | Refills: 0 | Status: SHIPPED | OUTPATIENT
Start: 2021-09-10

## 2021-09-10 RX ORDER — DEXTROSE 4 G
TABLET,CHEWABLE ORAL
Qty: 1 EACH | Refills: 0 | Status: SHIPPED | OUTPATIENT
Start: 2021-09-10 | End: 2023-09-29

## 2021-09-10 RX ORDER — INSULIN ASPART 100 [IU]/ML
4 INJECTION, SOLUTION INTRAVENOUS; SUBCUTANEOUS
Qty: 15 ML | Refills: 0 | Status: SHIPPED | OUTPATIENT
Start: 2021-09-10 | End: 2021-10-28

## 2021-09-10 RX ORDER — AMOXICILLIN 250 MG
1 CAPSULE ORAL 2 TIMES DAILY PRN
Qty: 60 TABLET | Refills: 0 | Status: SHIPPED | OUTPATIENT
Start: 2021-09-10

## 2021-09-10 RX ORDER — INSULIN ASPART 100 [IU]/ML
5 INJECTION, SOLUTION INTRAVENOUS; SUBCUTANEOUS
Status: DISCONTINUED | OUTPATIENT
Start: 2021-09-10 | End: 2021-09-11

## 2021-09-10 RX ORDER — SERTRALINE HYDROCHLORIDE 100 MG/1
100 TABLET, FILM COATED ORAL DAILY
Qty: 30 TABLET | Refills: 0 | Status: SHIPPED | OUTPATIENT
Start: 2021-09-10 | End: 2022-01-13 | Stop reason: SDUPTHER

## 2021-09-10 RX ORDER — NITROGLYCERIN 0.4 MG/1
0.4 TABLET SUBLINGUAL EVERY 5 MIN PRN
Qty: 25 TABLET | Refills: 0 | Status: SHIPPED | OUTPATIENT
Start: 2021-09-10 | End: 2022-04-20 | Stop reason: SDUPTHER

## 2021-09-10 RX ORDER — PEN NEEDLE, DIABETIC 30 GX3/16"
1 NEEDLE, DISPOSABLE MISCELLANEOUS
Qty: 100 EACH | Refills: 0 | Status: SHIPPED | OUTPATIENT
Start: 2021-09-10 | End: 2021-10-10

## 2021-09-10 RX ORDER — ACETAMINOPHEN 500 MG
1000 TABLET ORAL EVERY 8 HOURS PRN
Refills: 0 | COMMUNITY
Start: 2021-09-10

## 2021-09-10 RX ORDER — LOSARTAN POTASSIUM 50 MG/1
50 TABLET ORAL DAILY
Status: DISCONTINUED | OUTPATIENT
Start: 2021-09-11 | End: 2021-09-12 | Stop reason: HOSPADM

## 2021-09-10 RX ORDER — NIFEDIPINE 30 MG/1
30 TABLET, EXTENDED RELEASE ORAL DAILY
Status: DISCONTINUED | OUTPATIENT
Start: 2021-09-10 | End: 2021-09-10

## 2021-09-10 RX ORDER — INSULIN DETEMIR 100 [IU]/ML
12 INJECTION, SOLUTION SUBCUTANEOUS 2 TIMES DAILY
Qty: 15 ML | Refills: 0 | Status: SHIPPED | OUTPATIENT
Start: 2021-09-10 | End: 2021-09-12 | Stop reason: SDUPTHER

## 2021-09-10 RX ORDER — NIFEDIPINE 30 MG/1
30 TABLET, EXTENDED RELEASE ORAL 2 TIMES DAILY
Status: DISCONTINUED | OUTPATIENT
Start: 2021-09-10 | End: 2021-09-12 | Stop reason: HOSPADM

## 2021-09-10 RX ORDER — NIFEDIPINE 30 MG/1
30 TABLET, EXTENDED RELEASE ORAL DAILY
Qty: 30 TABLET | Refills: 0 | Status: SHIPPED | OUTPATIENT
Start: 2021-09-10 | End: 2021-09-12 | Stop reason: HOSPADM

## 2021-09-10 RX ORDER — LOSARTAN POTASSIUM 25 MG/1
25 TABLET ORAL DAILY
Qty: 30 TABLET | Refills: 0 | Status: SHIPPED | OUTPATIENT
Start: 2021-09-10 | End: 2021-09-12 | Stop reason: HOSPADM

## 2021-09-10 RX ORDER — PREGABALIN 25 MG/1
25 CAPSULE ORAL 3 TIMES DAILY
Qty: 90 CAPSULE | Refills: 0 | Status: SHIPPED | OUTPATIENT
Start: 2021-09-10 | End: 2021-09-16

## 2021-09-10 RX ORDER — LANCETS 33 GAUGE
EACH MISCELLANEOUS 4 TIMES DAILY
Qty: 200 EACH | Refills: 0 | Status: SHIPPED | OUTPATIENT
Start: 2021-09-10 | End: 2021-10-28 | Stop reason: SDUPTHER

## 2021-09-10 RX ORDER — HYDROCORTISONE 25 MG/G
CREAM TOPICAL 2 TIMES DAILY
Qty: 28 G | Refills: 0 | Status: SHIPPED | OUTPATIENT
Start: 2021-09-10 | End: 2021-09-24

## 2021-09-10 RX ORDER — LOSARTAN POTASSIUM 25 MG/1
25 TABLET ORAL DAILY
Status: DISCONTINUED | OUTPATIENT
Start: 2021-09-10 | End: 2021-09-10

## 2021-09-10 RX ORDER — ATORVASTATIN CALCIUM 40 MG/1
40 TABLET, FILM COATED ORAL DAILY
Qty: 30 TABLET | Refills: 0 | Status: SHIPPED | OUTPATIENT
Start: 2021-09-10 | End: 2021-10-14

## 2021-09-10 RX ORDER — INSULIN ASPART 100 [IU]/ML
4 INJECTION, SOLUTION INTRAVENOUS; SUBCUTANEOUS
Status: DISCONTINUED | OUTPATIENT
Start: 2021-09-10 | End: 2021-09-10

## 2021-09-10 RX ADMIN — PREGABALIN 75 MG: 75 CAPSULE ORAL at 03:09

## 2021-09-10 RX ADMIN — INSULIN ASPART 4 UNITS: 100 INJECTION, SOLUTION INTRAVENOUS; SUBCUTANEOUS at 12:09

## 2021-09-10 RX ADMIN — MUPIROCIN: 20 OINTMENT TOPICAL at 09:09

## 2021-09-10 RX ADMIN — SODIUM CHLORIDE, PRESERVATIVE FREE 10 ML: 5 INJECTION INTRAVENOUS at 05:09

## 2021-09-10 RX ADMIN — INSULIN ASPART 10 UNITS: 100 INJECTION, SOLUTION INTRAVENOUS; SUBCUTANEOUS at 12:09

## 2021-09-10 RX ADMIN — LOSARTAN POTASSIUM 25 MG: 25 TABLET, FILM COATED ORAL at 10:09

## 2021-09-10 RX ADMIN — HYDROCORTISONE: 25 CREAM TOPICAL at 05:09

## 2021-09-10 RX ADMIN — MUPIROCIN: 20 OINTMENT TOPICAL at 08:09

## 2021-09-10 RX ADMIN — AZITHROMYCIN 500 MG: 500 INJECTION, POWDER, LYOPHILIZED, FOR SOLUTION INTRAVENOUS at 11:09

## 2021-09-10 RX ADMIN — ENOXAPARIN SODIUM 40 MG: 40 INJECTION SUBCUTANEOUS at 05:09

## 2021-09-10 RX ADMIN — ASPIRIN 81 MG: 81 TABLET, COATED ORAL at 08:09

## 2021-09-10 RX ADMIN — INSULIN ASPART 5 UNITS: 100 INJECTION, SOLUTION INTRAVENOUS; SUBCUTANEOUS at 05:09

## 2021-09-10 RX ADMIN — OXYCODONE 5 MG: 5 TABLET ORAL at 09:09

## 2021-09-10 RX ADMIN — Medication 6 MG: at 09:09

## 2021-09-10 RX ADMIN — CEFTRIAXONE 1 G: 1 INJECTION, SOLUTION INTRAVENOUS at 10:09

## 2021-09-10 RX ADMIN — SERTRALINE HYDROCHLORIDE 100 MG: 100 TABLET ORAL at 08:09

## 2021-09-10 RX ADMIN — PREGABALIN 75 MG: 75 CAPSULE ORAL at 09:09

## 2021-09-10 RX ADMIN — SODIUM CHLORIDE, PRESERVATIVE FREE 10 ML: 5 INJECTION INTRAVENOUS at 12:09

## 2021-09-10 RX ADMIN — OXYCODONE 5 MG: 5 TABLET ORAL at 08:09

## 2021-09-10 RX ADMIN — OXYCODONE 5 MG: 5 TABLET ORAL at 02:09

## 2021-09-10 RX ADMIN — ATORVASTATIN CALCIUM 40 MG: 20 TABLET, FILM COATED ORAL at 08:09

## 2021-09-10 RX ADMIN — PANTOPRAZOLE SODIUM 40 MG: 40 TABLET, DELAYED RELEASE ORAL at 08:09

## 2021-09-10 RX ADMIN — NIFEDIPINE 30 MG: 30 TABLET, FILM COATED, EXTENDED RELEASE ORAL at 10:09

## 2021-09-10 RX ADMIN — NIFEDIPINE 30 MG: 30 TABLET, FILM COATED, EXTENDED RELEASE ORAL at 09:09

## 2021-09-10 RX ADMIN — PREGABALIN 75 MG: 75 CAPSULE ORAL at 08:09

## 2021-09-10 RX ADMIN — POTASSIUM BICARBONATE 25 MEQ: 978 TABLET, EFFERVESCENT ORAL at 08:09

## 2021-09-10 RX ADMIN — INSULIN ASPART 4 UNITS: 100 INJECTION, SOLUTION INTRAVENOUS; SUBCUTANEOUS at 08:09

## 2021-09-10 RX ADMIN — AMLODIPINE BESYLATE 5 MG: 5 TABLET ORAL at 08:09

## 2021-09-11 PROBLEM — R19.7 DIARRHEA: Status: ACTIVE | Noted: 2021-09-11

## 2021-09-11 LAB
ANION GAP SERPL CALC-SCNC: 11 MMOL/L (ref 8–16)
BACTERIA BLD CULT: NORMAL
BUN SERPL-MCNC: 8 MG/DL (ref 8–23)
C DIFF GDH STL QL: NEGATIVE
C DIFF TOX A+B STL QL IA: NEGATIVE
CALCIUM SERPL-MCNC: 8.2 MG/DL (ref 8.7–10.5)
CHLORIDE SERPL-SCNC: 109 MMOL/L (ref 95–110)
CO2 SERPL-SCNC: 22 MMOL/L (ref 23–29)
CREAT SERPL-MCNC: 1 MG/DL (ref 0.5–1.4)
EST. GFR  (AFRICAN AMERICAN): >60 ML/MIN/1.73 M^2
EST. GFR  (NON AFRICAN AMERICAN): 55 ML/MIN/1.73 M^2
GLUCOSE SERPL-MCNC: 359 MG/DL (ref 70–110)
PHOSPHATE SERPL-MCNC: 3.8 MG/DL (ref 2.7–4.5)
POCT GLUCOSE: 129 MG/DL (ref 70–110)
POCT GLUCOSE: 226 MG/DL (ref 70–110)
POCT GLUCOSE: 365 MG/DL (ref 70–110)
POCT GLUCOSE: 367 MG/DL (ref 70–110)
POCT GLUCOSE: 87 MG/DL (ref 70–110)
POTASSIUM SERPL-SCNC: 3.9 MMOL/L (ref 3.5–5.1)
SODIUM SERPL-SCNC: 142 MMOL/L (ref 136–145)

## 2021-09-11 PROCEDURE — 25000003 PHARM REV CODE 250: Performed by: INTERNAL MEDICINE

## 2021-09-11 PROCEDURE — 27000207 HC ISOLATION

## 2021-09-11 PROCEDURE — A4216 STERILE WATER/SALINE, 10 ML: HCPCS | Performed by: INTERNAL MEDICINE

## 2021-09-11 PROCEDURE — 99232 PR SUBSEQUENT HOSPITAL CARE,LEVL II: ICD-10-PCS | Mod: ,,, | Performed by: HOSPITALIST

## 2021-09-11 PROCEDURE — 80048 BASIC METABOLIC PNL TOTAL CA: CPT | Performed by: HOSPITALIST

## 2021-09-11 PROCEDURE — 25000003 PHARM REV CODE 250: Performed by: PHYSICIAN ASSISTANT

## 2021-09-11 PROCEDURE — 11000001 HC ACUTE MED/SURG PRIVATE ROOM

## 2021-09-11 PROCEDURE — 84100 ASSAY OF PHOSPHORUS: CPT | Performed by: HOSPITALIST

## 2021-09-11 PROCEDURE — 25000003 PHARM REV CODE 250: Performed by: HOSPITALIST

## 2021-09-11 PROCEDURE — C9399 UNCLASSIFIED DRUGS OR BIOLOG: HCPCS | Performed by: HOSPITALIST

## 2021-09-11 PROCEDURE — 99232 SBSQ HOSP IP/OBS MODERATE 35: CPT | Mod: ,,, | Performed by: HOSPITALIST

## 2021-09-11 PROCEDURE — 92526 ORAL FUNCTION THERAPY: CPT

## 2021-09-11 PROCEDURE — 63600175 PHARM REV CODE 636 W HCPCS: Performed by: HOSPITALIST

## 2021-09-11 RX ORDER — INSULIN ASPART 100 [IU]/ML
7 INJECTION, SOLUTION INTRAVENOUS; SUBCUTANEOUS
Status: DISCONTINUED | OUTPATIENT
Start: 2021-09-11 | End: 2021-09-12 | Stop reason: HOSPADM

## 2021-09-11 RX ORDER — SODIUM CHLORIDE 9 MG/ML
INJECTION, SOLUTION INTRAVENOUS CONTINUOUS
Status: ACTIVE | OUTPATIENT
Start: 2021-09-11 | End: 2021-09-12

## 2021-09-11 RX ORDER — LOPERAMIDE HYDROCHLORIDE 2 MG/1
2 CAPSULE ORAL 4 TIMES DAILY PRN
Status: DISCONTINUED | OUTPATIENT
Start: 2021-09-11 | End: 2021-09-12 | Stop reason: HOSPADM

## 2021-09-11 RX ADMIN — SODIUM CHLORIDE, PRESERVATIVE FREE 10 ML: 5 INJECTION INTRAVENOUS at 12:09

## 2021-09-11 RX ADMIN — INSULIN ASPART 4 UNITS: 100 INJECTION, SOLUTION INTRAVENOUS; SUBCUTANEOUS at 04:09

## 2021-09-11 RX ADMIN — PREGABALIN 75 MG: 75 CAPSULE ORAL at 02:09

## 2021-09-11 RX ADMIN — Medication 6 MG: at 09:09

## 2021-09-11 RX ADMIN — SODIUM CHLORIDE, PRESERVATIVE FREE 10 ML: 5 INJECTION INTRAVENOUS at 11:09

## 2021-09-11 RX ADMIN — INSULIN ASPART 5 UNITS: 100 INJECTION, SOLUTION INTRAVENOUS; SUBCUTANEOUS at 11:09

## 2021-09-11 RX ADMIN — INSULIN ASPART 5 UNITS: 100 INJECTION, SOLUTION INTRAVENOUS; SUBCUTANEOUS at 08:09

## 2021-09-11 RX ADMIN — OXYCODONE 5 MG: 5 TABLET ORAL at 08:09

## 2021-09-11 RX ADMIN — OXYCODONE 5 MG: 5 TABLET ORAL at 09:09

## 2021-09-11 RX ADMIN — INSULIN ASPART 7 UNITS: 100 INJECTION, SOLUTION INTRAVENOUS; SUBCUTANEOUS at 04:09

## 2021-09-11 RX ADMIN — PREGABALIN 75 MG: 75 CAPSULE ORAL at 08:09

## 2021-09-11 RX ADMIN — INSULIN DETEMIR 15 UNITS: 100 INJECTION, SOLUTION SUBCUTANEOUS at 08:09

## 2021-09-11 RX ADMIN — SERTRALINE HYDROCHLORIDE 100 MG: 100 TABLET ORAL at 08:09

## 2021-09-11 RX ADMIN — OXYCODONE 5 MG: 5 TABLET ORAL at 02:09

## 2021-09-11 RX ADMIN — SODIUM CHLORIDE, PRESERVATIVE FREE 10 ML: 5 INJECTION INTRAVENOUS at 06:09

## 2021-09-11 RX ADMIN — PREGABALIN 75 MG: 75 CAPSULE ORAL at 09:09

## 2021-09-11 RX ADMIN — LOSARTAN POTASSIUM 50 MG: 50 TABLET, FILM COATED ORAL at 08:09

## 2021-09-11 RX ADMIN — HYDROCORTISONE: 25 CREAM TOPICAL at 08:09

## 2021-09-11 RX ADMIN — ATORVASTATIN CALCIUM 40 MG: 20 TABLET, FILM COATED ORAL at 08:09

## 2021-09-11 RX ADMIN — NIFEDIPINE 30 MG: 30 TABLET, FILM COATED, EXTENDED RELEASE ORAL at 08:09

## 2021-09-11 RX ADMIN — INSULIN DETEMIR 15 UNITS: 100 INJECTION, SOLUTION SUBCUTANEOUS at 09:09

## 2021-09-11 RX ADMIN — SODIUM CHLORIDE: 0.9 INJECTION, SOLUTION INTRAVENOUS at 02:09

## 2021-09-11 RX ADMIN — INSULIN ASPART 10 UNITS: 100 INJECTION, SOLUTION INTRAVENOUS; SUBCUTANEOUS at 11:09

## 2021-09-11 RX ADMIN — ENOXAPARIN SODIUM 40 MG: 40 INJECTION SUBCUTANEOUS at 04:09

## 2021-09-11 RX ADMIN — ASPIRIN 81 MG: 81 TABLET, COATED ORAL at 08:09

## 2021-09-11 RX ADMIN — INSULIN ASPART 10 UNITS: 100 INJECTION, SOLUTION INTRAVENOUS; SUBCUTANEOUS at 08:09

## 2021-09-11 RX ADMIN — NIFEDIPINE 30 MG: 30 TABLET, FILM COATED, EXTENDED RELEASE ORAL at 09:09

## 2021-09-11 RX ADMIN — PANTOPRAZOLE SODIUM 40 MG: 40 TABLET, DELAYED RELEASE ORAL at 08:09

## 2021-09-12 VITALS
SYSTOLIC BLOOD PRESSURE: 145 MMHG | DIASTOLIC BLOOD PRESSURE: 67 MMHG | HEIGHT: 60 IN | RESPIRATION RATE: 17 BRPM | TEMPERATURE: 98 F | BODY MASS INDEX: 35.88 KG/M2 | OXYGEN SATURATION: 96 % | HEART RATE: 91 BPM | WEIGHT: 182.75 LBS

## 2021-09-12 LAB
ANION GAP SERPL CALC-SCNC: 13 MMOL/L (ref 8–16)
BASOPHILS # BLD AUTO: 0.03 K/UL (ref 0–0.2)
BASOPHILS NFR BLD: 0.3 % (ref 0–1.9)
BUN SERPL-MCNC: 10 MG/DL (ref 8–23)
CALCIUM SERPL-MCNC: 8 MG/DL (ref 8.7–10.5)
CHLORIDE SERPL-SCNC: 110 MMOL/L (ref 95–110)
CO2 SERPL-SCNC: 21 MMOL/L (ref 23–29)
CREAT SERPL-MCNC: 0.9 MG/DL (ref 0.5–1.4)
DIFFERENTIAL METHOD: ABNORMAL
EOSINOPHIL # BLD AUTO: 0.6 K/UL (ref 0–0.5)
EOSINOPHIL NFR BLD: 5.1 % (ref 0–8)
ERYTHROCYTE [DISTWIDTH] IN BLOOD BY AUTOMATED COUNT: 14.9 % (ref 11.5–14.5)
EST. GFR  (AFRICAN AMERICAN): >60 ML/MIN/1.73 M^2
EST. GFR  (NON AFRICAN AMERICAN): >60 ML/MIN/1.73 M^2
GLUCOSE SERPL-MCNC: 179 MG/DL (ref 70–110)
HCT VFR BLD AUTO: 30.7 % (ref 37–48.5)
HGB BLD-MCNC: 10.2 G/DL (ref 12–16)
IMM GRANULOCYTES # BLD AUTO: 0.07 K/UL (ref 0–0.04)
IMM GRANULOCYTES NFR BLD AUTO: 0.6 % (ref 0–0.5)
LYMPHOCYTES # BLD AUTO: 3.6 K/UL (ref 1–4.8)
LYMPHOCYTES NFR BLD: 29.9 % (ref 18–48)
MCH RBC QN AUTO: 30.1 PG (ref 27–31)
MCHC RBC AUTO-ENTMCNC: 33.2 G/DL (ref 32–36)
MCV RBC AUTO: 91 FL (ref 82–98)
MONOCYTES # BLD AUTO: 1.1 K/UL (ref 0.3–1)
MONOCYTES NFR BLD: 9.1 % (ref 4–15)
NEUTROPHILS # BLD AUTO: 6.6 K/UL (ref 1.8–7.7)
NEUTROPHILS NFR BLD: 55 % (ref 38–73)
NRBC BLD-RTO: 0 /100 WBC
PHOSPHATE SERPL-MCNC: 4 MG/DL (ref 2.7–4.5)
PLATELET # BLD AUTO: 252 K/UL (ref 150–450)
PMV BLD AUTO: 10.7 FL (ref 9.2–12.9)
POCT GLUCOSE: 242 MG/DL (ref 70–110)
POCT GLUCOSE: 268 MG/DL (ref 70–110)
POTASSIUM SERPL-SCNC: 3.7 MMOL/L (ref 3.5–5.1)
RBC # BLD AUTO: 3.39 M/UL (ref 4–5.4)
SODIUM SERPL-SCNC: 144 MMOL/L (ref 136–145)
WBC # BLD AUTO: 11.88 K/UL (ref 3.9–12.7)

## 2021-09-12 PROCEDURE — 80048 BASIC METABOLIC PNL TOTAL CA: CPT | Performed by: HOSPITALIST

## 2021-09-12 PROCEDURE — 25000003 PHARM REV CODE 250: Performed by: HOSPITALIST

## 2021-09-12 PROCEDURE — 85025 COMPLETE CBC W/AUTO DIFF WBC: CPT | Performed by: HOSPITALIST

## 2021-09-12 PROCEDURE — 25000003 PHARM REV CODE 250: Performed by: INTERNAL MEDICINE

## 2021-09-12 PROCEDURE — A4216 STERILE WATER/SALINE, 10 ML: HCPCS | Performed by: INTERNAL MEDICINE

## 2021-09-12 PROCEDURE — 99239 PR HOSPITAL DISCHARGE DAY,>30 MIN: ICD-10-PCS | Mod: ,,, | Performed by: HOSPITALIST

## 2021-09-12 PROCEDURE — 25000003 PHARM REV CODE 250: Performed by: PHYSICIAN ASSISTANT

## 2021-09-12 PROCEDURE — 99239 HOSP IP/OBS DSCHRG MGMT >30: CPT | Mod: ,,, | Performed by: HOSPITALIST

## 2021-09-12 PROCEDURE — 92526 ORAL FUNCTION THERAPY: CPT

## 2021-09-12 PROCEDURE — 84100 ASSAY OF PHOSPHORUS: CPT | Performed by: HOSPITALIST

## 2021-09-12 RX ORDER — INSULIN DETEMIR 100 [IU]/ML
15 INJECTION, SOLUTION SUBCUTANEOUS 2 TIMES DAILY
Qty: 15 ML | Refills: 0 | Status: SHIPPED | OUTPATIENT
Start: 2021-09-12 | End: 2021-10-28

## 2021-09-12 RX ORDER — NIFEDIPINE 30 MG/1
30 TABLET, EXTENDED RELEASE ORAL 2 TIMES DAILY
Qty: 180 TABLET | Refills: 0 | Status: SHIPPED | OUTPATIENT
Start: 2021-09-12 | End: 2024-02-27 | Stop reason: SDUPTHER

## 2021-09-12 RX ORDER — PROMETHAZINE HYDROCHLORIDE 25 MG/1
25 TABLET ORAL EVERY 6 HOURS PRN
Qty: 30 TABLET | Refills: 0 | Status: SHIPPED | OUTPATIENT
Start: 2021-09-12 | End: 2021-12-09 | Stop reason: SDUPTHER

## 2021-09-12 RX ORDER — LOSARTAN POTASSIUM 50 MG/1
50 TABLET ORAL DAILY
Qty: 90 TABLET | Refills: 0 | Status: SHIPPED | OUTPATIENT
Start: 2021-09-12 | End: 2024-02-27 | Stop reason: SDUPTHER

## 2021-09-12 RX ADMIN — INSULIN DETEMIR 15 UNITS: 100 INJECTION, SOLUTION SUBCUTANEOUS at 08:09

## 2021-09-12 RX ADMIN — ASPIRIN 81 MG: 81 TABLET, COATED ORAL at 08:09

## 2021-09-12 RX ADMIN — LOSARTAN POTASSIUM 50 MG: 50 TABLET, FILM COATED ORAL at 08:09

## 2021-09-12 RX ADMIN — SODIUM CHLORIDE, PRESERVATIVE FREE 10 ML: 5 INJECTION INTRAVENOUS at 06:09

## 2021-09-12 RX ADMIN — SERTRALINE HYDROCHLORIDE 100 MG: 100 TABLET ORAL at 08:09

## 2021-09-12 RX ADMIN — PANTOPRAZOLE SODIUM 40 MG: 40 TABLET, DELAYED RELEASE ORAL at 08:09

## 2021-09-12 RX ADMIN — INSULIN ASPART 7 UNITS: 100 INJECTION, SOLUTION INTRAVENOUS; SUBCUTANEOUS at 08:09

## 2021-09-12 RX ADMIN — HYDROCORTISONE: 25 CREAM TOPICAL at 08:09

## 2021-09-12 RX ADMIN — ATORVASTATIN CALCIUM 40 MG: 20 TABLET, FILM COATED ORAL at 08:09

## 2021-09-12 RX ADMIN — NIFEDIPINE 30 MG: 30 TABLET, FILM COATED, EXTENDED RELEASE ORAL at 08:09

## 2021-09-12 RX ADMIN — INSULIN ASPART 4 UNITS: 100 INJECTION, SOLUTION INTRAVENOUS; SUBCUTANEOUS at 08:09

## 2021-09-12 RX ADMIN — PREGABALIN 75 MG: 75 CAPSULE ORAL at 08:09

## 2021-09-12 RX ADMIN — OXYCODONE 5 MG: 5 TABLET ORAL at 08:09

## 2021-09-12 RX ADMIN — SODIUM CHLORIDE, PRESERVATIVE FREE 10 ML: 5 INJECTION INTRAVENOUS at 12:09

## 2021-09-13 ENCOUNTER — TELEPHONE (OUTPATIENT)
Dept: FAMILY MEDICINE | Facility: CLINIC | Age: 76
End: 2021-09-13

## 2021-09-14 ENCOUNTER — PATIENT OUTREACH (OUTPATIENT)
Dept: ADMINISTRATIVE | Facility: OTHER | Age: 76
End: 2021-09-14

## 2021-09-15 ENCOUNTER — TELEPHONE (OUTPATIENT)
Dept: PAIN MEDICINE | Facility: CLINIC | Age: 76
End: 2021-09-15

## 2021-09-15 PROCEDURE — G0180 MD CERTIFICATION HHA PATIENT: HCPCS | Mod: ,,, | Performed by: HOSPITALIST

## 2021-09-15 PROCEDURE — G0180 PR HOME HEALTH MD CERTIFICATION: ICD-10-PCS | Mod: ,,, | Performed by: HOSPITALIST

## 2021-09-16 ENCOUNTER — OFFICE VISIT (OUTPATIENT)
Dept: PAIN MEDICINE | Facility: CLINIC | Age: 76
End: 2021-09-16
Attending: ANESTHESIOLOGY
Payer: MEDICARE

## 2021-09-16 VITALS
SYSTOLIC BLOOD PRESSURE: 178 MMHG | WEIGHT: 176.38 LBS | HEART RATE: 106 BPM | BODY MASS INDEX: 34.63 KG/M2 | TEMPERATURE: 98 F | RESPIRATION RATE: 18 BRPM | DIASTOLIC BLOOD PRESSURE: 87 MMHG | HEIGHT: 60 IN

## 2021-09-16 DIAGNOSIS — M79.2 NEURALGIA AND NEURITIS: ICD-10-CM

## 2021-09-16 DIAGNOSIS — G89.4 CHRONIC PAIN SYNDROME: Primary | ICD-10-CM

## 2021-09-16 DIAGNOSIS — M51.36 DEGENERATION OF INTERVERTEBRAL DISC OF LUMBAR REGION: ICD-10-CM

## 2021-09-16 DIAGNOSIS — E11.40 PAINFUL DIABETIC NEUROPATHY: ICD-10-CM

## 2021-09-16 PROCEDURE — 99215 OFFICE O/P EST HI 40 MIN: CPT | Mod: PBBFAC | Performed by: ANESTHESIOLOGY

## 2021-09-16 PROCEDURE — 99999 PR PBB SHADOW E&M-EST. PATIENT-LVL V: ICD-10-PCS | Mod: PBBFAC,,, | Performed by: ANESTHESIOLOGY

## 2021-09-16 PROCEDURE — 99999 PR PBB SHADOW E&M-EST. PATIENT-LVL V: CPT | Mod: PBBFAC,,, | Performed by: ANESTHESIOLOGY

## 2021-09-16 PROCEDURE — 99214 PR OFFICE/OUTPT VISIT, EST, LEVL IV, 30-39 MIN: ICD-10-PCS | Mod: S$PBB,,, | Performed by: ANESTHESIOLOGY

## 2021-09-16 PROCEDURE — 99214 OFFICE O/P EST MOD 30 MIN: CPT | Mod: S$PBB,,, | Performed by: ANESTHESIOLOGY

## 2021-09-16 RX ORDER — METHOCARBAMOL 500 MG/1
500 TABLET, FILM COATED ORAL 3 TIMES DAILY
Qty: 90 TABLET | Refills: 0 | Status: SHIPPED | OUTPATIENT
Start: 2021-09-16 | End: 2021-10-14 | Stop reason: SDUPTHER

## 2021-09-16 RX ORDER — HYDROCODONE BITARTRATE AND ACETAMINOPHEN 10; 325 MG/1; MG/1
1 TABLET ORAL EVERY 8 HOURS PRN
Qty: 90 TABLET | Refills: 0 | Status: SHIPPED | OUTPATIENT
Start: 2021-09-16 | End: 2021-10-14 | Stop reason: SDUPTHER

## 2021-09-16 RX ORDER — PREGABALIN 150 MG/1
150 CAPSULE ORAL 3 TIMES DAILY
Qty: 90 CAPSULE | Refills: 0 | Status: SHIPPED | OUTPATIENT
Start: 2021-09-16 | End: 2021-10-14 | Stop reason: SDUPTHER

## 2021-09-20 ENCOUNTER — HOSPITAL ENCOUNTER (EMERGENCY)
Facility: OTHER | Age: 76
Discharge: HOME | End: 2021-09-20
Attending: EMERGENCY MEDICINE
Payer: MEDICARE

## 2021-09-20 VITALS
BODY MASS INDEX: 33.38 KG/M2 | RESPIRATION RATE: 17 BRPM | WEIGHT: 170 LBS | HEIGHT: 60 IN | TEMPERATURE: 98 F | SYSTOLIC BLOOD PRESSURE: 176 MMHG | OXYGEN SATURATION: 97 % | HEART RATE: 89 BPM | DIASTOLIC BLOOD PRESSURE: 76 MMHG

## 2021-09-20 DIAGNOSIS — M25.522 ARTHRALGIA OF BOTH ELBOWS: ICD-10-CM

## 2021-09-20 DIAGNOSIS — E11.65 TYPE 2 DIABETES MELLITUS WITH HYPERGLYCEMIA, WITH LONG-TERM CURRENT USE OF INSULIN: ICD-10-CM

## 2021-09-20 DIAGNOSIS — R23.3 PETECHIAL RASH: Primary | ICD-10-CM

## 2021-09-20 DIAGNOSIS — M25.521 ARTHRALGIA OF BOTH ELBOWS: ICD-10-CM

## 2021-09-20 DIAGNOSIS — Z79.4 TYPE 2 DIABETES MELLITUS WITH HYPERGLYCEMIA, WITH LONG-TERM CURRENT USE OF INSULIN: ICD-10-CM

## 2021-09-20 DIAGNOSIS — I10 ESSENTIAL HYPERTENSION: ICD-10-CM

## 2021-09-20 LAB
ALBUMIN SERPL BCP-MCNC: 2.9 G/DL (ref 3.5–5.2)
ALP SERPL-CCNC: 92 U/L (ref 55–135)
ALT SERPL W/O P-5'-P-CCNC: 11 U/L (ref 10–44)
ANION GAP SERPL CALC-SCNC: 11 MMOL/L (ref 8–16)
AST SERPL-CCNC: 17 U/L (ref 10–40)
BASOPHILS # BLD AUTO: 0.06 K/UL (ref 0–0.2)
BASOPHILS NFR BLD: 0.4 % (ref 0–1.9)
BILIRUB SERPL-MCNC: 0.2 MG/DL (ref 0.1–1)
BUN SERPL-MCNC: 14 MG/DL (ref 8–23)
CALCIUM SERPL-MCNC: 8.9 MG/DL (ref 8.7–10.5)
CHLORIDE SERPL-SCNC: 101 MMOL/L (ref 95–110)
CO2 SERPL-SCNC: 23 MMOL/L (ref 23–29)
CREAT SERPL-MCNC: 1.2 MG/DL (ref 0.5–1.4)
CRP SERPL-MCNC: 16.7 MG/L (ref 0–8.2)
DIFFERENTIAL METHOD: ABNORMAL
EOSINOPHIL # BLD AUTO: 0.4 K/UL (ref 0–0.5)
EOSINOPHIL NFR BLD: 2.5 % (ref 0–8)
ERYTHROCYTE [DISTWIDTH] IN BLOOD BY AUTOMATED COUNT: 14.2 % (ref 11.5–14.5)
ERYTHROCYTE [SEDIMENTATION RATE] IN BLOOD: 79 MM/HR (ref 0–20)
EST. GFR  (AFRICAN AMERICAN): 51 ML/MIN/1.73 M^2
EST. GFR  (NON AFRICAN AMERICAN): 44 ML/MIN/1.73 M^2
GLUCOSE SERPL-MCNC: 374 MG/DL (ref 70–110)
HCT VFR BLD AUTO: 33.3 % (ref 37–48.5)
HGB BLD-MCNC: 10.7 G/DL (ref 12–16)
IMM GRANULOCYTES # BLD AUTO: 0.06 K/UL (ref 0–0.04)
IMM GRANULOCYTES NFR BLD AUTO: 0.4 % (ref 0–0.5)
LYMPHOCYTES # BLD AUTO: 2.8 K/UL (ref 1–4.8)
LYMPHOCYTES NFR BLD: 19.7 % (ref 18–48)
MCH RBC QN AUTO: 29.2 PG (ref 27–31)
MCHC RBC AUTO-ENTMCNC: 32.1 G/DL (ref 32–36)
MCV RBC AUTO: 91 FL (ref 82–98)
MONOCYTES # BLD AUTO: 0.8 K/UL (ref 0.3–1)
MONOCYTES NFR BLD: 5.6 % (ref 4–15)
NEUTROPHILS # BLD AUTO: 10 K/UL (ref 1.8–7.7)
NEUTROPHILS NFR BLD: 71.4 % (ref 38–73)
NRBC BLD-RTO: 0 /100 WBC
PLATELET # BLD AUTO: 318 K/UL (ref 150–450)
PLATELET BLD QL SMEAR: ABNORMAL
PMV BLD AUTO: 11 FL (ref 9.2–12.9)
POCT GLUCOSE: 299 MG/DL (ref 70–110)
POTASSIUM SERPL-SCNC: 4.5 MMOL/L (ref 3.5–5.1)
PROT SERPL-MCNC: 7 G/DL (ref 6–8.4)
RBC # BLD AUTO: 3.67 M/UL (ref 4–5.4)
SODIUM SERPL-SCNC: 135 MMOL/L (ref 136–145)
WBC # BLD AUTO: 14.01 K/UL (ref 3.9–12.7)

## 2021-09-20 PROCEDURE — 85025 COMPLETE CBC W/AUTO DIFF WBC: CPT | Performed by: EMERGENCY MEDICINE

## 2021-09-20 PROCEDURE — 80053 COMPREHEN METABOLIC PANEL: CPT | Performed by: EMERGENCY MEDICINE

## 2021-09-20 PROCEDURE — 86140 C-REACTIVE PROTEIN: CPT | Performed by: EMERGENCY MEDICINE

## 2021-09-20 PROCEDURE — 82962 GLUCOSE BLOOD TEST: CPT

## 2021-09-20 PROCEDURE — 99283 EMERGENCY DEPT VISIT LOW MDM: CPT | Mod: 25

## 2021-09-20 PROCEDURE — 85651 RBC SED RATE NONAUTOMATED: CPT | Performed by: EMERGENCY MEDICINE

## 2021-09-21 ENCOUNTER — TELEPHONE (OUTPATIENT)
Dept: DERMATOLOGY | Facility: CLINIC | Age: 76
End: 2021-09-21

## 2021-09-21 ENCOUNTER — PATIENT MESSAGE (OUTPATIENT)
Dept: INTERNAL MEDICINE | Facility: CLINIC | Age: 76
End: 2021-09-21

## 2021-09-21 DIAGNOSIS — R23.3 PETECHIAL RASH: Primary | ICD-10-CM

## 2021-09-22 ENCOUNTER — PATIENT MESSAGE (OUTPATIENT)
Dept: DERMATOLOGY | Facility: CLINIC | Age: 76
End: 2021-09-22

## 2021-09-22 ENCOUNTER — LAB VISIT (OUTPATIENT)
Dept: LAB | Facility: OTHER | Age: 76
End: 2021-09-22
Attending: STUDENT IN AN ORGANIZED HEALTH CARE EDUCATION/TRAINING PROGRAM
Payer: MEDICARE

## 2021-09-22 ENCOUNTER — OFFICE VISIT (OUTPATIENT)
Dept: INTERNAL MEDICINE | Facility: CLINIC | Age: 76
End: 2021-09-22
Payer: MEDICARE

## 2021-09-22 VITALS
HEART RATE: 100 BPM | OXYGEN SATURATION: 95 % | BODY MASS INDEX: 35.62 KG/M2 | DIASTOLIC BLOOD PRESSURE: 82 MMHG | HEIGHT: 60 IN | SYSTOLIC BLOOD PRESSURE: 142 MMHG | WEIGHT: 181.44 LBS

## 2021-09-22 DIAGNOSIS — D69.2 PURPURA: ICD-10-CM

## 2021-09-22 DIAGNOSIS — R30.0 DYSURIA: Primary | ICD-10-CM

## 2021-09-22 DIAGNOSIS — Z79.4 TYPE 2 DIABETES MELLITUS WITH HYPERGLYCEMIA, WITH LONG-TERM CURRENT USE OF INSULIN: ICD-10-CM

## 2021-09-22 DIAGNOSIS — R23.3 EASY BRUISING: ICD-10-CM

## 2021-09-22 DIAGNOSIS — E11.65 TYPE 2 DIABETES MELLITUS WITH HYPERGLYCEMIA, WITH LONG-TERM CURRENT USE OF INSULIN: ICD-10-CM

## 2021-09-22 LAB
ALBUMIN SERPL BCP-MCNC: 3 G/DL (ref 3.5–5.2)
ALP SERPL-CCNC: 99 U/L (ref 55–135)
ALT SERPL W/O P-5'-P-CCNC: 10 U/L (ref 10–44)
ANION GAP SERPL CALC-SCNC: 13 MMOL/L (ref 8–16)
APTT BLDCRRT: 23.9 SEC (ref 21–32)
AST SERPL-CCNC: 19 U/L (ref 10–40)
BASOPHILS # BLD AUTO: 0.05 K/UL (ref 0–0.2)
BASOPHILS NFR BLD: 0.4 % (ref 0–1.9)
BILIRUB SERPL-MCNC: 0.1 MG/DL (ref 0.1–1)
BILIRUB SERPL-MCNC: ABNORMAL MG/DL
BLOOD URINE, POC: ABNORMAL
BUN SERPL-MCNC: 21 MG/DL (ref 8–23)
CALCIUM SERPL-MCNC: 9.2 MG/DL (ref 8.7–10.5)
CHLORIDE SERPL-SCNC: 105 MMOL/L (ref 95–110)
CLARITY, POC UA: CLEAR
CO2 SERPL-SCNC: 23 MMOL/L (ref 23–29)
COLOR, POC UA: ABNORMAL
CREAT SERPL-MCNC: 1.4 MG/DL (ref 0.5–1.4)
DIFFERENTIAL METHOD: ABNORMAL
EOSINOPHIL # BLD AUTO: 0.4 K/UL (ref 0–0.5)
EOSINOPHIL NFR BLD: 3.2 % (ref 0–8)
ERYTHROCYTE [DISTWIDTH] IN BLOOD BY AUTOMATED COUNT: 14.1 % (ref 11.5–14.5)
EST. GFR  (AFRICAN AMERICAN): 42 ML/MIN/1.73 M^2
EST. GFR  (NON AFRICAN AMERICAN): 37 ML/MIN/1.73 M^2
GLUCOSE SERPL-MCNC: 90 MG/DL (ref 70–110)
GLUCOSE UR QL STRIP: 250
HAPTOGLOB SERPL-MCNC: 383 MG/DL (ref 30–250)
HCT VFR BLD AUTO: 32.4 % (ref 37–48.5)
HGB BLD-MCNC: 10.5 G/DL (ref 12–16)
IMM GRANULOCYTES # BLD AUTO: 0.05 K/UL (ref 0–0.04)
IMM GRANULOCYTES NFR BLD AUTO: 0.4 % (ref 0–0.5)
INR PPP: 0.9 (ref 0.8–1.2)
KETONES UR QL STRIP: ABNORMAL
LDH SERPL L TO P-CCNC: 274 U/L (ref 110–260)
LEUKOCYTE ESTERASE URINE, POC: ABNORMAL
LYMPHOCYTES # BLD AUTO: 3.7 K/UL (ref 1–4.8)
LYMPHOCYTES NFR BLD: 32.1 % (ref 18–48)
MCH RBC QN AUTO: 29.2 PG (ref 27–31)
MCHC RBC AUTO-ENTMCNC: 32.4 G/DL (ref 32–36)
MCV RBC AUTO: 90 FL (ref 82–98)
MONOCYTES # BLD AUTO: 1 K/UL (ref 0.3–1)
MONOCYTES NFR BLD: 8.5 % (ref 4–15)
NEUTROPHILS # BLD AUTO: 6.4 K/UL (ref 1.8–7.7)
NEUTROPHILS NFR BLD: 55.4 % (ref 38–73)
NITRITE, POC UA: ABNORMAL
NRBC BLD-RTO: 0 /100 WBC
PATH REV BLD -IMP: NORMAL
PH, POC UA: 6
PLATELET # BLD AUTO: 398 K/UL (ref 150–450)
PMV BLD AUTO: 10.7 FL (ref 9.2–12.9)
POTASSIUM SERPL-SCNC: 4.2 MMOL/L (ref 3.5–5.1)
PROT SERPL-MCNC: 7 G/DL (ref 6–8.4)
PROTEIN, POC: ABNORMAL
PROTHROMBIN TIME: 10.4 SEC (ref 9–12.5)
RBC # BLD AUTO: 3.6 M/UL (ref 4–5.4)
SODIUM SERPL-SCNC: 141 MMOL/L (ref 136–145)
SPECIFIC GRAVITY, POC UA: 1.01
UROBILINOGEN, POC UA: NORMAL
WBC # BLD AUTO: 11.56 K/UL (ref 3.9–12.7)

## 2021-09-22 PROCEDURE — 81002 URINALYSIS NONAUTO W/O SCOPE: CPT | Mod: PBBFAC | Performed by: STUDENT IN AN ORGANIZED HEALTH CARE EDUCATION/TRAINING PROGRAM

## 2021-09-22 PROCEDURE — 85060 PATHOLOGIST REVIEW: ICD-10-PCS | Mod: ,,, | Performed by: PATHOLOGY

## 2021-09-22 PROCEDURE — 86038 ANTINUCLEAR ANTIBODIES: CPT | Performed by: STUDENT IN AN ORGANIZED HEALTH CARE EDUCATION/TRAINING PROGRAM

## 2021-09-22 PROCEDURE — 85610 PROTHROMBIN TIME: CPT | Performed by: STUDENT IN AN ORGANIZED HEALTH CARE EDUCATION/TRAINING PROGRAM

## 2021-09-22 PROCEDURE — 99214 OFFICE O/P EST MOD 30 MIN: CPT | Mod: S$PBB,,, | Performed by: STUDENT IN AN ORGANIZED HEALTH CARE EDUCATION/TRAINING PROGRAM

## 2021-09-22 PROCEDURE — 99214 OFFICE O/P EST MOD 30 MIN: CPT | Mod: PBBFAC | Performed by: STUDENT IN AN ORGANIZED HEALTH CARE EDUCATION/TRAINING PROGRAM

## 2021-09-22 PROCEDURE — 83615 LACTATE (LD) (LDH) ENZYME: CPT | Performed by: STUDENT IN AN ORGANIZED HEALTH CARE EDUCATION/TRAINING PROGRAM

## 2021-09-22 PROCEDURE — 87086 URINE CULTURE/COLONY COUNT: CPT | Performed by: STUDENT IN AN ORGANIZED HEALTH CARE EDUCATION/TRAINING PROGRAM

## 2021-09-22 PROCEDURE — 99999 PR PBB SHADOW E&M-EST. PATIENT-LVL IV: ICD-10-PCS | Mod: PBBFAC,,, | Performed by: STUDENT IN AN ORGANIZED HEALTH CARE EDUCATION/TRAINING PROGRAM

## 2021-09-22 PROCEDURE — 85060 BLOOD SMEAR INTERPRETATION: CPT | Mod: ,,, | Performed by: PATHOLOGY

## 2021-09-22 PROCEDURE — 36415 COLL VENOUS BLD VENIPUNCTURE: CPT | Performed by: STUDENT IN AN ORGANIZED HEALTH CARE EDUCATION/TRAINING PROGRAM

## 2021-09-22 PROCEDURE — 83010 ASSAY OF HAPTOGLOBIN QUANT: CPT | Performed by: STUDENT IN AN ORGANIZED HEALTH CARE EDUCATION/TRAINING PROGRAM

## 2021-09-22 PROCEDURE — 80053 COMPREHEN METABOLIC PANEL: CPT | Performed by: STUDENT IN AN ORGANIZED HEALTH CARE EDUCATION/TRAINING PROGRAM

## 2021-09-22 PROCEDURE — 99214 PR OFFICE/OUTPT VISIT, EST, LEVL IV, 30-39 MIN: ICD-10-PCS | Mod: S$PBB,,, | Performed by: STUDENT IN AN ORGANIZED HEALTH CARE EDUCATION/TRAINING PROGRAM

## 2021-09-22 PROCEDURE — 99999 PR PBB SHADOW E&M-EST. PATIENT-LVL IV: CPT | Mod: PBBFAC,,, | Performed by: STUDENT IN AN ORGANIZED HEALTH CARE EDUCATION/TRAINING PROGRAM

## 2021-09-22 PROCEDURE — 85730 THROMBOPLASTIN TIME PARTIAL: CPT | Performed by: STUDENT IN AN ORGANIZED HEALTH CARE EDUCATION/TRAINING PROGRAM

## 2021-09-22 PROCEDURE — 85025 COMPLETE CBC W/AUTO DIFF WBC: CPT | Performed by: STUDENT IN AN ORGANIZED HEALTH CARE EDUCATION/TRAINING PROGRAM

## 2021-09-22 RX ORDER — SULFAMETHOXAZOLE AND TRIMETHOPRIM 800; 160 MG/1; MG/1
1 TABLET ORAL 2 TIMES DAILY
Qty: 10 TABLET | Refills: 0 | Status: SHIPPED | OUTPATIENT
Start: 2021-09-22 | End: 2021-09-27

## 2021-09-23 LAB — ANA SER QL IF: NORMAL

## 2021-09-24 ENCOUNTER — OFFICE VISIT (OUTPATIENT)
Dept: DERMATOLOGY | Facility: CLINIC | Age: 76
End: 2021-09-24
Payer: MEDICARE

## 2021-09-24 ENCOUNTER — PATIENT MESSAGE (OUTPATIENT)
Dept: INTERNAL MEDICINE | Facility: CLINIC | Age: 76
End: 2021-09-24

## 2021-09-24 DIAGNOSIS — M32.9 HISTORY OF SYSTEMIC LUPUS ERYTHEMATOSUS: ICD-10-CM

## 2021-09-24 DIAGNOSIS — D48.5 NEOPLASM OF UNCERTAIN BEHAVIOR OF SKIN: Primary | ICD-10-CM

## 2021-09-24 DIAGNOSIS — R23.3 PETECHIAL RASH: ICD-10-CM

## 2021-09-24 DIAGNOSIS — D69.2 PALPABLE PURPURA: ICD-10-CM

## 2021-09-24 DIAGNOSIS — D64.9 ANEMIA, UNSPECIFIED TYPE: Primary | ICD-10-CM

## 2021-09-24 DIAGNOSIS — N39.0 RECURRENT UTI (URINARY TRACT INFECTION): ICD-10-CM

## 2021-09-24 LAB — PATH REV BLD -IMP: NORMAL

## 2021-09-24 PROCEDURE — 88350 IMFLUOR EA ADDL 1ANTB STN PX: CPT | Performed by: PATHOLOGY

## 2021-09-24 PROCEDURE — 11104 PUNCH BX SKIN SINGLE LESION: CPT | Mod: S$GLB,,, | Performed by: DERMATOLOGY

## 2021-09-24 PROCEDURE — 11105 PR PUNCH BIOPSY, SKIN, EA ADDTL LESION: ICD-10-PCS | Mod: S$GLB,,, | Performed by: DERMATOLOGY

## 2021-09-24 PROCEDURE — 99204 OFFICE O/P NEW MOD 45 MIN: CPT | Mod: 25,S$GLB,, | Performed by: DERMATOLOGY

## 2021-09-24 PROCEDURE — 11105 PUNCH BX SKIN EA SEP/ADDL: CPT | Mod: S$GLB,,, | Performed by: DERMATOLOGY

## 2021-09-24 PROCEDURE — 88305 TISSUE EXAM BY PATHOLOGIST: ICD-10-PCS | Mod: 26,,, | Performed by: PATHOLOGY

## 2021-09-24 PROCEDURE — 88305 TISSUE EXAM BY PATHOLOGIST: CPT | Performed by: PATHOLOGY

## 2021-09-24 PROCEDURE — 88305 TISSUE EXAM BY PATHOLOGIST: CPT | Mod: 26,,, | Performed by: PATHOLOGY

## 2021-09-24 PROCEDURE — 99204 PR OFFICE/OUTPT VISIT, NEW, LEVL IV, 45-59 MIN: ICD-10-PCS | Mod: 25,S$GLB,, | Performed by: DERMATOLOGY

## 2021-09-24 PROCEDURE — 88346 IMFLUOR 1ST 1ANTB STAIN PX: CPT | Performed by: PATHOLOGY

## 2021-09-24 PROCEDURE — 11104 PR PUNCH BIOPSY, SKIN, SINGLE LESION: ICD-10-PCS | Mod: S$GLB,,, | Performed by: DERMATOLOGY

## 2021-09-24 RX ORDER — TRIAMCINOLONE ACETONIDE 1 MG/G
CREAM TOPICAL
Qty: 454 G | Refills: 0 | Status: SHIPPED | OUTPATIENT
Start: 2021-09-24 | End: 2022-02-20 | Stop reason: SDUPTHER

## 2021-09-25 ENCOUNTER — TELEPHONE (OUTPATIENT)
Dept: INTERNAL MEDICINE | Facility: CLINIC | Age: 76
End: 2021-09-25

## 2021-09-25 LAB — BACTERIA UR CULT: NORMAL

## 2021-09-27 ENCOUNTER — PATIENT MESSAGE (OUTPATIENT)
Dept: INTERNAL MEDICINE | Facility: CLINIC | Age: 76
End: 2021-09-27

## 2021-09-27 ENCOUNTER — OFFICE VISIT (OUTPATIENT)
Dept: INTERNAL MEDICINE | Facility: CLINIC | Age: 76
End: 2021-09-27
Payer: MEDICARE

## 2021-09-27 DIAGNOSIS — R11.0 NAUSEA: Primary | ICD-10-CM

## 2021-09-27 DIAGNOSIS — E11.65 TYPE 2 DIABETES MELLITUS WITH HYPERGLYCEMIA, WITH LONG-TERM CURRENT USE OF INSULIN: ICD-10-CM

## 2021-09-27 DIAGNOSIS — Z79.4 TYPE 2 DIABETES MELLITUS WITH HYPERGLYCEMIA, WITH LONG-TERM CURRENT USE OF INSULIN: ICD-10-CM

## 2021-09-27 DIAGNOSIS — R53.1 WEAKNESS: ICD-10-CM

## 2021-09-27 PROCEDURE — 99215 OFFICE O/P EST HI 40 MIN: CPT | Mod: 95,,, | Performed by: STUDENT IN AN ORGANIZED HEALTH CARE EDUCATION/TRAINING PROGRAM

## 2021-09-27 PROCEDURE — 99215 PR OFFICE/OUTPT VISIT, EST, LEVL V, 40-54 MIN: ICD-10-PCS | Mod: 95,,, | Performed by: STUDENT IN AN ORGANIZED HEALTH CARE EDUCATION/TRAINING PROGRAM

## 2021-09-28 ENCOUNTER — LAB VISIT (OUTPATIENT)
Dept: LAB | Facility: OTHER | Age: 76
End: 2021-09-28
Attending: STUDENT IN AN ORGANIZED HEALTH CARE EDUCATION/TRAINING PROGRAM
Payer: MEDICARE

## 2021-09-28 ENCOUNTER — TELEPHONE (OUTPATIENT)
Dept: DERMATOLOGY | Facility: CLINIC | Age: 76
End: 2021-09-28

## 2021-09-28 DIAGNOSIS — R11.0 NAUSEA: ICD-10-CM

## 2021-09-28 DIAGNOSIS — E11.65 TYPE 2 DIABETES MELLITUS WITH HYPERGLYCEMIA, WITH LONG-TERM CURRENT USE OF INSULIN: ICD-10-CM

## 2021-09-28 DIAGNOSIS — Z79.4 TYPE 2 DIABETES MELLITUS WITH HYPERGLYCEMIA, WITH LONG-TERM CURRENT USE OF INSULIN: ICD-10-CM

## 2021-09-28 DIAGNOSIS — R53.1 WEAKNESS: ICD-10-CM

## 2021-09-28 LAB
ALBUMIN SERPL BCP-MCNC: 2.9 G/DL (ref 3.5–5.2)
ALP SERPL-CCNC: 98 U/L (ref 55–135)
ALT SERPL W/O P-5'-P-CCNC: 12 U/L (ref 10–44)
ANION GAP SERPL CALC-SCNC: 11 MMOL/L (ref 8–16)
AST SERPL-CCNC: 19 U/L (ref 10–40)
B-OH-BUTYR BLD STRIP-SCNC: 0.1 MMOL/L (ref 0–0.5)
BASOPHILS # BLD AUTO: 0.04 K/UL (ref 0–0.2)
BASOPHILS NFR BLD: 0.4 % (ref 0–1.9)
BILIRUB SERPL-MCNC: 0.1 MG/DL (ref 0.1–1)
BUN SERPL-MCNC: 13 MG/DL (ref 8–23)
CALCIUM SERPL-MCNC: 8.5 MG/DL (ref 8.7–10.5)
CHLORIDE SERPL-SCNC: 104 MMOL/L (ref 95–110)
CO2 SERPL-SCNC: 19 MMOL/L (ref 23–29)
CREAT SERPL-MCNC: 1.2 MG/DL (ref 0.5–1.4)
DIFFERENTIAL METHOD: ABNORMAL
EOSINOPHIL # BLD AUTO: 0.3 K/UL (ref 0–0.5)
EOSINOPHIL NFR BLD: 3 % (ref 0–8)
ERYTHROCYTE [DISTWIDTH] IN BLOOD BY AUTOMATED COUNT: 14.4 % (ref 11.5–14.5)
EST. GFR  (AFRICAN AMERICAN): 51 ML/MIN/1.73 M^2
EST. GFR  (NON AFRICAN AMERICAN): 44 ML/MIN/1.73 M^2
GLUCOSE SERPL-MCNC: 250 MG/DL (ref 70–110)
HCT VFR BLD AUTO: 29.6 % (ref 37–48.5)
HGB BLD-MCNC: 9.6 G/DL (ref 12–16)
IMM GRANULOCYTES # BLD AUTO: 0.05 K/UL (ref 0–0.04)
IMM GRANULOCYTES NFR BLD AUTO: 0.5 % (ref 0–0.5)
LYMPHOCYTES # BLD AUTO: 2.1 K/UL (ref 1–4.8)
LYMPHOCYTES NFR BLD: 20.2 % (ref 18–48)
MCH RBC QN AUTO: 29.5 PG (ref 27–31)
MCHC RBC AUTO-ENTMCNC: 32.4 G/DL (ref 32–36)
MCV RBC AUTO: 91 FL (ref 82–98)
MONOCYTES # BLD AUTO: 0.7 K/UL (ref 0.3–1)
MONOCYTES NFR BLD: 7.1 % (ref 4–15)
NEUTROPHILS # BLD AUTO: 7 K/UL (ref 1.8–7.7)
NEUTROPHILS NFR BLD: 68.8 % (ref 38–73)
NRBC BLD-RTO: 0 /100 WBC
PLATELET # BLD AUTO: 300 K/UL (ref 150–450)
PMV BLD AUTO: 11.3 FL (ref 9.2–12.9)
POTASSIUM SERPL-SCNC: 4.8 MMOL/L (ref 3.5–5.1)
PROT SERPL-MCNC: 6.7 G/DL (ref 6–8.4)
RBC # BLD AUTO: 3.25 M/UL (ref 4–5.4)
SODIUM SERPL-SCNC: 134 MMOL/L (ref 136–145)
WBC # BLD AUTO: 10.18 K/UL (ref 3.9–12.7)

## 2021-09-28 PROCEDURE — 85025 COMPLETE CBC W/AUTO DIFF WBC: CPT | Performed by: STUDENT IN AN ORGANIZED HEALTH CARE EDUCATION/TRAINING PROGRAM

## 2021-09-28 PROCEDURE — 80053 COMPREHEN METABOLIC PANEL: CPT | Performed by: STUDENT IN AN ORGANIZED HEALTH CARE EDUCATION/TRAINING PROGRAM

## 2021-09-28 PROCEDURE — 82010 KETONE BODYS QUAN: CPT | Performed by: STUDENT IN AN ORGANIZED HEALTH CARE EDUCATION/TRAINING PROGRAM

## 2021-09-28 PROCEDURE — 36415 COLL VENOUS BLD VENIPUNCTURE: CPT | Performed by: STUDENT IN AN ORGANIZED HEALTH CARE EDUCATION/TRAINING PROGRAM

## 2021-09-30 ENCOUNTER — EXTERNAL HOME HEALTH (OUTPATIENT)
Dept: HOME HEALTH SERVICES | Facility: HOSPITAL | Age: 76
End: 2021-09-30
Payer: MEDICARE

## 2021-09-30 ENCOUNTER — HOSPITAL ENCOUNTER (INPATIENT)
Facility: OTHER | Age: 76
LOS: 4 days | Discharge: HOME HEALTH | DRG: 291 | End: 2021-10-08
Attending: EMERGENCY MEDICINE | Admitting: INTERNAL MEDICINE
Payer: MEDICARE

## 2021-09-30 DIAGNOSIS — R50.9 FEVER: ICD-10-CM

## 2021-09-30 DIAGNOSIS — I50.33 ACUTE ON CHRONIC DIASTOLIC HEART FAILURE: ICD-10-CM

## 2021-09-30 DIAGNOSIS — R41.82 ALTERED MENTAL STATUS, UNSPECIFIED ALTERED MENTAL STATUS TYPE: Primary | ICD-10-CM

## 2021-09-30 DIAGNOSIS — E87.70 VOLUME OVERLOAD: ICD-10-CM

## 2021-09-30 DIAGNOSIS — R05.9 COUGH: ICD-10-CM

## 2021-09-30 LAB
ALBUMIN SERPL BCP-MCNC: 2.7 G/DL (ref 3.5–5.2)
ALP SERPL-CCNC: 100 U/L (ref 55–135)
ALT SERPL W/O P-5'-P-CCNC: 11 U/L (ref 10–44)
ANION GAP SERPL CALC-SCNC: 13 MMOL/L (ref 8–16)
AST SERPL-CCNC: 16 U/L (ref 10–40)
B-OH-BUTYR BLD STRIP-SCNC: 0.1 MMOL/L (ref 0–0.5)
BASOPHILS # BLD AUTO: 0.05 K/UL (ref 0–0.2)
BASOPHILS NFR BLD: 0.3 % (ref 0–1.9)
BILIRUB SERPL-MCNC: 0.2 MG/DL (ref 0.1–1)
BUN SERPL-MCNC: 19 MG/DL (ref 8–23)
CALCIUM SERPL-MCNC: 8.5 MG/DL (ref 8.7–10.5)
CHLORIDE SERPL-SCNC: 100 MMOL/L (ref 95–110)
CO2 SERPL-SCNC: 18 MMOL/L (ref 23–29)
CREAT SERPL-MCNC: 1.5 MG/DL (ref 0.5–1.4)
CTP QC/QA: YES
DIFFERENTIAL METHOD: ABNORMAL
EOSINOPHIL # BLD AUTO: 0.4 K/UL (ref 0–0.5)
EOSINOPHIL NFR BLD: 2.4 % (ref 0–8)
ERYTHROCYTE [DISTWIDTH] IN BLOOD BY AUTOMATED COUNT: 14.4 % (ref 11.5–14.5)
EST. GFR  (AFRICAN AMERICAN): 39 ML/MIN/1.73 M^2
EST. GFR  (NON AFRICAN AMERICAN): 34 ML/MIN/1.73 M^2
GLUCOSE SERPL-MCNC: 319 MG/DL (ref 70–110)
HCT VFR BLD AUTO: 29.9 % (ref 37–48.5)
HGB BLD-MCNC: 9.6 G/DL (ref 12–16)
IMM GRANULOCYTES # BLD AUTO: 0.13 K/UL (ref 0–0.04)
IMM GRANULOCYTES NFR BLD AUTO: 0.7 % (ref 0–0.5)
LACTATE SERPL-SCNC: 1.9 MMOL/L (ref 0.5–2.2)
LYMPHOCYTES # BLD AUTO: 2.3 K/UL (ref 1–4.8)
LYMPHOCYTES NFR BLD: 12.8 % (ref 18–48)
MCH RBC QN AUTO: 29.2 PG (ref 27–31)
MCHC RBC AUTO-ENTMCNC: 32.1 G/DL (ref 32–36)
MCV RBC AUTO: 91 FL (ref 82–98)
MONOCYTES # BLD AUTO: 1.5 K/UL (ref 0.3–1)
MONOCYTES NFR BLD: 8.5 % (ref 4–15)
NEUTROPHILS # BLD AUTO: 13.3 K/UL (ref 1.8–7.7)
NEUTROPHILS NFR BLD: 75.3 % (ref 38–73)
NRBC BLD-RTO: 0 /100 WBC
PLATELET # BLD AUTO: 327 K/UL (ref 150–450)
PMV BLD AUTO: 11.6 FL (ref 9.2–12.9)
POCT GLUCOSE: 286 MG/DL (ref 70–110)
POTASSIUM SERPL-SCNC: 4.8 MMOL/L (ref 3.5–5.1)
PROCALCITONIN SERPL IA-MCNC: 0.12 NG/ML
PROT SERPL-MCNC: 6.7 G/DL (ref 6–8.4)
RBC # BLD AUTO: 3.29 M/UL (ref 4–5.4)
SARS-COV-2 RDRP RESP QL NAA+PROBE: NEGATIVE
SODIUM SERPL-SCNC: 131 MMOL/L (ref 136–145)
WBC # BLD AUTO: 17.71 K/UL (ref 3.9–12.7)

## 2021-09-30 PROCEDURE — 82962 GLUCOSE BLOOD TEST: CPT

## 2021-09-30 PROCEDURE — 94640 AIRWAY INHALATION TREATMENT: CPT

## 2021-09-30 PROCEDURE — 99285 EMERGENCY DEPT VISIT HI MDM: CPT | Mod: 25

## 2021-09-30 PROCEDURE — 84145 PROCALCITONIN (PCT): CPT | Performed by: EMERGENCY MEDICINE

## 2021-09-30 PROCEDURE — 93010 EKG 12-LEAD: ICD-10-PCS | Mod: ,,, | Performed by: INTERNAL MEDICINE

## 2021-09-30 PROCEDURE — 82010 KETONE BODYS QUAN: CPT | Performed by: EMERGENCY MEDICINE

## 2021-09-30 PROCEDURE — 83605 ASSAY OF LACTIC ACID: CPT | Performed by: EMERGENCY MEDICINE

## 2021-09-30 PROCEDURE — 80053 COMPREHEN METABOLIC PANEL: CPT | Performed by: EMERGENCY MEDICINE

## 2021-09-30 PROCEDURE — 93010 ELECTROCARDIOGRAM REPORT: CPT | Mod: ,,, | Performed by: INTERNAL MEDICINE

## 2021-09-30 PROCEDURE — U0002 COVID-19 LAB TEST NON-CDC: HCPCS | Performed by: EMERGENCY MEDICINE

## 2021-09-30 PROCEDURE — 93005 ELECTROCARDIOGRAM TRACING: CPT

## 2021-09-30 PROCEDURE — 96361 HYDRATE IV INFUSION ADD-ON: CPT

## 2021-09-30 PROCEDURE — 25000003 PHARM REV CODE 250: Performed by: EMERGENCY MEDICINE

## 2021-09-30 PROCEDURE — 25000242 PHARM REV CODE 250 ALT 637 W/ HCPCS: Performed by: EMERGENCY MEDICINE

## 2021-09-30 PROCEDURE — 85025 COMPLETE CBC W/AUTO DIFF WBC: CPT | Performed by: EMERGENCY MEDICINE

## 2021-09-30 RX ORDER — IPRATROPIUM BROMIDE AND ALBUTEROL SULFATE 2.5; .5 MG/3ML; MG/3ML
3 SOLUTION RESPIRATORY (INHALATION)
Status: COMPLETED | OUTPATIENT
Start: 2021-09-30 | End: 2021-09-30

## 2021-09-30 RX ADMIN — SODIUM CHLORIDE 1000 ML: 0.9 INJECTION, SOLUTION INTRAVENOUS at 09:09

## 2021-09-30 RX ADMIN — IPRATROPIUM BROMIDE AND ALBUTEROL SULFATE 3 ML: .5; 3 SOLUTION RESPIRATORY (INHALATION) at 10:09

## 2021-10-01 PROBLEM — G89.29 CHRONIC PAIN: Chronic | Status: ACTIVE | Noted: 2021-08-25

## 2021-10-01 PROBLEM — G93.41 ENCEPHALOPATHY, METABOLIC: Status: ACTIVE | Noted: 2021-10-01

## 2021-10-01 PROBLEM — I50.9 ACUTE ON CHRONIC HEART FAILURE: Status: ACTIVE | Noted: 2021-10-01

## 2021-10-01 PROBLEM — R41.82 ALTERED MENTAL STATUS: Status: ACTIVE | Noted: 2021-10-01

## 2021-10-01 PROBLEM — E87.70 VOLUME OVERLOAD: Status: RESOLVED | Noted: 2021-10-01 | Resolved: 2021-10-01

## 2021-10-01 PROBLEM — E11.42 DIABETIC POLYNEUROPATHY: Chronic | Status: ACTIVE | Noted: 2019-03-12

## 2021-10-01 PROBLEM — E87.70 VOLUME OVERLOAD: Status: ACTIVE | Noted: 2021-10-01

## 2021-10-01 PROBLEM — I50.33 ACUTE ON CHRONIC DIASTOLIC HEART FAILURE: Status: ACTIVE | Noted: 2021-10-01

## 2021-10-01 PROBLEM — I10 ESSENTIAL HYPERTENSION: Chronic | Status: ACTIVE | Noted: 2021-09-10

## 2021-10-01 PROBLEM — J18.9 PNEUMONIA: Status: ACTIVE | Noted: 2021-10-01

## 2021-10-01 LAB
ANION GAP SERPL CALC-SCNC: 13 MMOL/L (ref 8–16)
AV PEAK GRADIENT: 7 MMHG
AV VELOCITY RATIO: 0.95
BACTERIA #/AREA URNS HPF: ABNORMAL /HPF
BASOPHILS # BLD AUTO: 0.03 K/UL (ref 0–0.2)
BASOPHILS NFR BLD: 0.3 % (ref 0–1.9)
BILIRUB UR QL STRIP: NEGATIVE
BNP SERPL-MCNC: 271 PG/ML (ref 0–99)
BSA FOR ECHO PROCEDURE: 1.86 M2
BUN SERPL-MCNC: 21 MG/DL (ref 8–23)
CALCIUM SERPL-MCNC: 8.2 MG/DL (ref 8.7–10.5)
CHLORIDE SERPL-SCNC: 106 MMOL/L (ref 95–110)
CLARITY UR: CLEAR
CO2 SERPL-SCNC: 15 MMOL/L (ref 23–29)
COLOR UR: YELLOW
CREAT SERPL-MCNC: 1.4 MG/DL (ref 0.5–1.4)
CV ECHO LV RWT: 0.4 CM
DIFFERENTIAL METHOD: ABNORMAL
DOP CALC AO PEAK VEL: 1.36 M/S
DOP CALC LVOT AREA: 2.6 CM2
DOP CALC LVOT DIAMETER: 1.81 CM
DOP CALC LVOT PEAK VEL: 1.29 M/S
DOP CALC LVOT STROKE VOLUME: 71.29 CM3
DOP CALCLVOT PEAK VEL VTI: 27.72 CM
E WAVE DECELERATION TIME: 213.12 MSEC
E/A RATIO: 0.76
E/E' RATIO: 10.71 M/S
ECHO LV POSTERIOR WALL: 0.83 CM (ref 0.6–1.1)
EJECTION FRACTION: 55 %
EOSINOPHIL # BLD AUTO: 0.3 K/UL (ref 0–0.5)
EOSINOPHIL NFR BLD: 2.5 % (ref 0–8)
ERYTHROCYTE [DISTWIDTH] IN BLOOD BY AUTOMATED COUNT: 14.5 % (ref 11.5–14.5)
EST. GFR  (AFRICAN AMERICAN): 42 ML/MIN/1.73 M^2
EST. GFR  (NON AFRICAN AMERICAN): 37 ML/MIN/1.73 M^2
FRACTIONAL SHORTENING: 30 % (ref 28–44)
GLUCOSE SERPL-MCNC: 452 MG/DL (ref 70–110)
GLUCOSE UR QL STRIP: ABNORMAL
HCT VFR BLD AUTO: 28.8 % (ref 37–48.5)
HGB BLD-MCNC: 9.4 G/DL (ref 12–16)
HGB UR QL STRIP: ABNORMAL
IMM GRANULOCYTES # BLD AUTO: 0.07 K/UL (ref 0–0.04)
IMM GRANULOCYTES NFR BLD AUTO: 0.7 % (ref 0–0.5)
INTERVENTRICULAR SEPTUM: 0.97 CM (ref 0.6–1.1)
IVRT: 72.31 MSEC
KETONES UR QL STRIP: NEGATIVE
LA MAJOR: 6.13 CM
LA MINOR: 5.77 CM
LA WIDTH: 3.7 CM
LACTATE SERPL-SCNC: 0.7 MMOL/L (ref 0.5–2.2)
LEFT ATRIUM SIZE: 3.26 CM
LEFT ATRIUM VOLUME INDEX MOD: 38.5 ML/M2
LEFT ATRIUM VOLUME INDEX: 34 ML/M2
LEFT ATRIUM VOLUME MOD: 69 CM3
LEFT ATRIUM VOLUME: 60.95 CM3
LEFT INTERNAL DIMENSION IN SYSTOLE: 2.91 CM (ref 2.1–4)
LEFT VENTRICLE DIASTOLIC VOLUME INDEX: 42.23 ML/M2
LEFT VENTRICLE DIASTOLIC VOLUME: 75.59 ML
LEFT VENTRICLE MASS INDEX: 65 G/M2
LEFT VENTRICLE SYSTOLIC VOLUME INDEX: 18.2 ML/M2
LEFT VENTRICLE SYSTOLIC VOLUME: 32.51 ML
LEFT VENTRICULAR INTERNAL DIMENSION IN DIASTOLE: 4.13 CM (ref 3.5–6)
LEFT VENTRICULAR MASS: 115.48 G
LEUKOCYTE ESTERASE UR QL STRIP: NEGATIVE
LV LATERAL E/E' RATIO: 11.38 M/S
LV SEPTAL E/E' RATIO: 10.11 M/S
LYMPHOCYTES # BLD AUTO: 2.1 K/UL (ref 1–4.8)
LYMPHOCYTES NFR BLD: 20.2 % (ref 18–48)
MAGNESIUM SERPL-MCNC: 1.9 MG/DL (ref 1.6–2.6)
MCH RBC QN AUTO: 30.7 PG (ref 27–31)
MCHC RBC AUTO-ENTMCNC: 32.6 G/DL (ref 32–36)
MCV RBC AUTO: 94 FL (ref 82–98)
MICROSCOPIC COMMENT: ABNORMAL
MONOCYTES # BLD AUTO: 1 K/UL (ref 0.3–1)
MONOCYTES NFR BLD: 9.8 % (ref 4–15)
MV PEAK A VEL: 1.2 M/S
MV PEAK E VEL: 0.91 M/S
MV STENOSIS PRESSURE HALF TIME: 0 MS
NEUTROPHILS # BLD AUTO: 6.8 K/UL (ref 1.8–7.7)
NEUTROPHILS NFR BLD: 66.5 % (ref 38–73)
NITRITE UR QL STRIP: NEGATIVE
NRBC BLD-RTO: 0 /100 WBC
PH UR STRIP: 6 [PH] (ref 5–8)
PISA TR MAX VEL: 2.31 M/S
PLATELET # BLD AUTO: 181 K/UL (ref 150–450)
PLATELET BLD QL SMEAR: ABNORMAL
PMV BLD AUTO: 12.5 FL (ref 9.2–12.9)
POCT GLUCOSE: 280 MG/DL (ref 70–110)
POCT GLUCOSE: 374 MG/DL (ref 70–110)
POCT GLUCOSE: 374 MG/DL (ref 70–110)
POCT GLUCOSE: 456 MG/DL (ref 70–110)
POTASSIUM SERPL-SCNC: 5.2 MMOL/L (ref 3.5–5.1)
PROT UR QL STRIP: ABNORMAL
PULM VEIN S/D RATIO: 1.26
PV PEAK D VEL: 0.61 M/S
PV PEAK S VEL: 0.77 M/S
PV PEAK VELOCITY: 1.39 CM/S
RA MAJOR: 5.37 CM
RA WIDTH: 2.94 CM
RBC # BLD AUTO: 3.06 M/UL (ref 4–5.4)
RBC #/AREA URNS HPF: 8 /HPF (ref 0–4)
SINUS: 2.72 CM
SODIUM SERPL-SCNC: 134 MMOL/L (ref 136–145)
SP GR UR STRIP: <=1.005 (ref 1–1.03)
SQUAMOUS #/AREA URNS HPF: 3 /HPF
TDI LATERAL: 0.08 M/S
TDI SEPTAL: 0.09 M/S
TDI: 0.09 M/S
TR MAX PG: 21 MMHG
TRICUSPID ANNULAR PLANE SYSTOLIC EXCURSION: 1.11 CM
URN SPEC COLLECT METH UR: ABNORMAL
UROBILINOGEN UR STRIP-ACNC: NEGATIVE EU/DL
WBC # BLD AUTO: 10.24 K/UL (ref 3.9–12.7)
WBC #/AREA URNS HPF: 1 /HPF (ref 0–5)

## 2021-10-01 PROCEDURE — 80048 BASIC METABOLIC PNL TOTAL CA: CPT | Performed by: PHYSICIAN ASSISTANT

## 2021-10-01 PROCEDURE — 63600175 PHARM REV CODE 636 W HCPCS: Performed by: INTERNAL MEDICINE

## 2021-10-01 PROCEDURE — 25000242 PHARM REV CODE 250 ALT 637 W/ HCPCS: Performed by: PHYSICIAN ASSISTANT

## 2021-10-01 PROCEDURE — 97162 PT EVAL MOD COMPLEX 30 MIN: CPT

## 2021-10-01 PROCEDURE — G0378 HOSPITAL OBSERVATION PER HR: HCPCS

## 2021-10-01 PROCEDURE — A4216 STERILE WATER/SALINE, 10 ML: HCPCS | Performed by: PHYSICIAN ASSISTANT

## 2021-10-01 PROCEDURE — 25000003 PHARM REV CODE 250: Performed by: PHYSICIAN ASSISTANT

## 2021-10-01 PROCEDURE — 94799 UNLISTED PULMONARY SVC/PX: CPT

## 2021-10-01 PROCEDURE — 81000 URINALYSIS NONAUTO W/SCOPE: CPT | Performed by: EMERGENCY MEDICINE

## 2021-10-01 PROCEDURE — 83880 ASSAY OF NATRIURETIC PEPTIDE: CPT | Performed by: EMERGENCY MEDICINE

## 2021-10-01 PROCEDURE — 92610 EVALUATE SWALLOWING FUNCTION: CPT

## 2021-10-01 PROCEDURE — 25000003 PHARM REV CODE 250: Performed by: INTERNAL MEDICINE

## 2021-10-01 PROCEDURE — 96365 THER/PROPH/DIAG IV INF INIT: CPT

## 2021-10-01 PROCEDURE — 83605 ASSAY OF LACTIC ACID: CPT | Performed by: EMERGENCY MEDICINE

## 2021-10-01 PROCEDURE — 99220 PR INITIAL OBSERVATION CARE,LEVL III: ICD-10-PCS | Mod: ,,, | Performed by: INTERNAL MEDICINE

## 2021-10-01 PROCEDURE — 36415 COLL VENOUS BLD VENIPUNCTURE: CPT | Performed by: PHYSICIAN ASSISTANT

## 2021-10-01 PROCEDURE — C9399 UNCLASSIFIED DRUGS OR BIOLOG: HCPCS | Performed by: INTERNAL MEDICINE

## 2021-10-01 PROCEDURE — 63600175 PHARM REV CODE 636 W HCPCS: Performed by: PHYSICIAN ASSISTANT

## 2021-10-01 PROCEDURE — 83735 ASSAY OF MAGNESIUM: CPT | Performed by: PHYSICIAN ASSISTANT

## 2021-10-01 PROCEDURE — 97165 OT EVAL LOW COMPLEX 30 MIN: CPT

## 2021-10-01 PROCEDURE — 85025 COMPLETE CBC W/AUTO DIFF WBC: CPT | Performed by: PHYSICIAN ASSISTANT

## 2021-10-01 PROCEDURE — 99220 PR INITIAL OBSERVATION CARE,LEVL III: CPT | Mod: ,,, | Performed by: INTERNAL MEDICINE

## 2021-10-01 PROCEDURE — 63600175 PHARM REV CODE 636 W HCPCS: Performed by: EMERGENCY MEDICINE

## 2021-10-01 PROCEDURE — 94640 AIRWAY INHALATION TREATMENT: CPT

## 2021-10-01 PROCEDURE — 94761 N-INVAS EAR/PLS OXIMETRY MLT: CPT

## 2021-10-01 PROCEDURE — C9399 UNCLASSIFIED DRUGS OR BIOLOG: HCPCS | Performed by: PHYSICIAN ASSISTANT

## 2021-10-01 PROCEDURE — 27000221 HC OXYGEN, UP TO 24 HOURS

## 2021-10-01 RX ORDER — HYDROCODONE BITARTRATE AND ACETAMINOPHEN 10; 325 MG/1; MG/1
1 TABLET ORAL EVERY 8 HOURS PRN
Status: DISCONTINUED | OUTPATIENT
Start: 2021-10-01 | End: 2021-10-08 | Stop reason: HOSPADM

## 2021-10-01 RX ORDER — SODIUM CHLORIDE 0.9 % (FLUSH) 0.9 %
10 SYRINGE (ML) INJECTION EVERY 8 HOURS
Status: DISCONTINUED | OUTPATIENT
Start: 2021-10-01 | End: 2021-10-08 | Stop reason: HOSPADM

## 2021-10-01 RX ORDER — INSULIN ASPART 100 [IU]/ML
8 INJECTION, SOLUTION INTRAVENOUS; SUBCUTANEOUS
Status: DISCONTINUED | OUTPATIENT
Start: 2021-10-01 | End: 2021-10-01

## 2021-10-01 RX ORDER — METHOCARBAMOL 500 MG/1
500 TABLET, FILM COATED ORAL 3 TIMES DAILY
Status: DISCONTINUED | OUTPATIENT
Start: 2021-10-01 | End: 2021-10-08 | Stop reason: HOSPADM

## 2021-10-01 RX ORDER — PROMETHAZINE HYDROCHLORIDE AND CODEINE PHOSPHATE 6.25; 1 MG/5ML; MG/5ML
5 SOLUTION ORAL NIGHTLY PRN
Status: DISCONTINUED | OUTPATIENT
Start: 2021-10-01 | End: 2021-10-01

## 2021-10-01 RX ORDER — NIFEDIPINE 30 MG/1
30 TABLET, EXTENDED RELEASE ORAL 2 TIMES DAILY
Status: DISCONTINUED | OUTPATIENT
Start: 2021-10-01 | End: 2021-10-03

## 2021-10-01 RX ORDER — INSULIN ASPART 100 [IU]/ML
12 INJECTION, SOLUTION INTRAVENOUS; SUBCUTANEOUS
Status: DISCONTINUED | OUTPATIENT
Start: 2021-10-01 | End: 2021-10-02

## 2021-10-01 RX ORDER — ASPIRIN 81 MG/1
81 TABLET ORAL DAILY
Status: DISCONTINUED | OUTPATIENT
Start: 2021-10-01 | End: 2021-10-08 | Stop reason: HOSPADM

## 2021-10-01 RX ORDER — CEFEPIME HYDROCHLORIDE 1 G/50ML
2 INJECTION, SOLUTION INTRAVENOUS
Status: DISCONTINUED | OUTPATIENT
Start: 2021-10-01 | End: 2021-10-02

## 2021-10-01 RX ORDER — ATORVASTATIN CALCIUM 20 MG/1
40 TABLET, FILM COATED ORAL DAILY
Status: DISCONTINUED | OUTPATIENT
Start: 2021-10-01 | End: 2021-10-06

## 2021-10-01 RX ORDER — SERTRALINE HYDROCHLORIDE 25 MG/1
100 TABLET, FILM COATED ORAL DAILY
Status: DISCONTINUED | OUTPATIENT
Start: 2021-10-01 | End: 2021-10-08 | Stop reason: HOSPADM

## 2021-10-01 RX ORDER — TRIAMCINOLONE ACETONIDE 1 MG/G
CREAM TOPICAL 2 TIMES DAILY
Status: DISCONTINUED | OUTPATIENT
Start: 2021-10-01 | End: 2021-10-08 | Stop reason: HOSPADM

## 2021-10-01 RX ORDER — CEFEPIME HYDROCHLORIDE 1 G/50ML
1 INJECTION, SOLUTION INTRAVENOUS
Status: COMPLETED | OUTPATIENT
Start: 2021-10-01 | End: 2021-10-01

## 2021-10-01 RX ORDER — FUROSEMIDE 10 MG/ML
20 INJECTION INTRAMUSCULAR; INTRAVENOUS ONCE
Status: COMPLETED | OUTPATIENT
Start: 2021-10-01 | End: 2021-10-01

## 2021-10-01 RX ORDER — GUAIFENESIN/DEXTROMETHORPHAN 100-10MG/5
10 SYRUP ORAL EVERY 4 HOURS PRN
Status: DISCONTINUED | OUTPATIENT
Start: 2021-10-01 | End: 2021-10-08 | Stop reason: HOSPADM

## 2021-10-01 RX ORDER — INSULIN ASPART 100 [IU]/ML
5 INJECTION, SOLUTION INTRAVENOUS; SUBCUTANEOUS
Status: DISCONTINUED | OUTPATIENT
Start: 2021-10-01 | End: 2021-10-01

## 2021-10-01 RX ORDER — FUROSEMIDE 10 MG/ML
40 INJECTION INTRAMUSCULAR; INTRAVENOUS 2 TIMES DAILY
Status: DISCONTINUED | OUTPATIENT
Start: 2021-10-02 | End: 2021-10-08 | Stop reason: HOSPADM

## 2021-10-01 RX ORDER — TALC
6 POWDER (GRAM) TOPICAL NIGHTLY PRN
Status: DISCONTINUED | OUTPATIENT
Start: 2021-10-01 | End: 2021-10-08 | Stop reason: HOSPADM

## 2021-10-01 RX ORDER — IBUPROFEN 200 MG
24 TABLET ORAL
Status: DISCONTINUED | OUTPATIENT
Start: 2021-10-01 | End: 2021-10-08 | Stop reason: HOSPADM

## 2021-10-01 RX ORDER — INSULIN ASPART 100 [IU]/ML
1-10 INJECTION, SOLUTION INTRAVENOUS; SUBCUTANEOUS
Status: DISCONTINUED | OUTPATIENT
Start: 2021-10-01 | End: 2021-10-08 | Stop reason: HOSPADM

## 2021-10-01 RX ORDER — SODIUM CHLORIDE 0.9 % (FLUSH) 0.9 %
10 SYRINGE (ML) INJECTION
Status: CANCELLED | OUTPATIENT
Start: 2021-10-01

## 2021-10-01 RX ORDER — PANTOPRAZOLE SODIUM 40 MG/1
40 TABLET, DELAYED RELEASE ORAL DAILY
Status: DISCONTINUED | OUTPATIENT
Start: 2021-10-01 | End: 2021-10-08 | Stop reason: HOSPADM

## 2021-10-01 RX ORDER — AMOXICILLIN 250 MG
1 CAPSULE ORAL 2 TIMES DAILY PRN
Status: DISCONTINUED | OUTPATIENT
Start: 2021-10-01 | End: 2021-10-07

## 2021-10-01 RX ORDER — IBUPROFEN 200 MG
16 TABLET ORAL
Status: DISCONTINUED | OUTPATIENT
Start: 2021-10-01 | End: 2021-10-08 | Stop reason: HOSPADM

## 2021-10-01 RX ORDER — GLUCAGON 1 MG
1 KIT INJECTION
Status: DISCONTINUED | OUTPATIENT
Start: 2021-10-01 | End: 2021-10-08 | Stop reason: HOSPADM

## 2021-10-01 RX ORDER — LOSARTAN POTASSIUM 50 MG/1
50 TABLET ORAL DAILY
Status: DISCONTINUED | OUTPATIENT
Start: 2021-10-01 | End: 2021-10-08 | Stop reason: HOSPADM

## 2021-10-01 RX ORDER — AMOXICILLIN 250 MG
1 CAPSULE ORAL 2 TIMES DAILY PRN
Status: DISCONTINUED | OUTPATIENT
Start: 2021-10-01 | End: 2021-10-01

## 2021-10-01 RX ORDER — BENZONATATE 100 MG/1
100 CAPSULE ORAL 3 TIMES DAILY PRN
Status: DISCONTINUED | OUTPATIENT
Start: 2021-10-01 | End: 2021-10-08 | Stop reason: HOSPADM

## 2021-10-01 RX ORDER — HEPARIN SODIUM 5000 [USP'U]/ML
5000 INJECTION, SOLUTION INTRAVENOUS; SUBCUTANEOUS EVERY 8 HOURS
Status: DISCONTINUED | OUTPATIENT
Start: 2021-10-01 | End: 2021-10-08 | Stop reason: HOSPADM

## 2021-10-01 RX ORDER — CLINDAMYCIN PHOSPHATE 600 MG/50ML
600 INJECTION, SOLUTION INTRAVENOUS
Status: DISCONTINUED | OUTPATIENT
Start: 2021-10-01 | End: 2021-10-01

## 2021-10-01 RX ORDER — TALC
6 POWDER (GRAM) TOPICAL NIGHTLY PRN
Status: CANCELLED | OUTPATIENT
Start: 2021-10-01

## 2021-10-01 RX ORDER — CEFEPIME HYDROCHLORIDE 1 G/50ML
2 INJECTION, SOLUTION INTRAVENOUS
Status: DISCONTINUED | OUTPATIENT
Start: 2021-10-01 | End: 2021-10-01

## 2021-10-01 RX ORDER — ACETAMINOPHEN 325 MG/1
650 TABLET ORAL EVERY 4 HOURS PRN
Status: DISCONTINUED | OUTPATIENT
Start: 2021-10-01 | End: 2021-10-08 | Stop reason: HOSPADM

## 2021-10-01 RX ORDER — PREGABALIN 75 MG/1
150 CAPSULE ORAL 3 TIMES DAILY
Status: DISCONTINUED | OUTPATIENT
Start: 2021-10-01 | End: 2021-10-08 | Stop reason: HOSPADM

## 2021-10-01 RX ADMIN — FUROSEMIDE 20 MG: 10 INJECTION, SOLUTION INTRAMUSCULAR; INTRAVENOUS at 09:10

## 2021-10-01 RX ADMIN — INSULIN ASPART 10 UNITS: 100 INJECTION, SOLUTION INTRAVENOUS; SUBCUTANEOUS at 09:10

## 2021-10-01 RX ADMIN — Medication 10 ML: at 09:10

## 2021-10-01 RX ADMIN — PREGABALIN 150 MG: 75 CAPSULE ORAL at 02:10

## 2021-10-01 RX ADMIN — INSULIN DETEMIR 15 UNITS: 100 INJECTION, SOLUTION SUBCUTANEOUS at 09:10

## 2021-10-01 RX ADMIN — TRIAMCINOLONE ACETONIDE: 1 CREAM TOPICAL at 09:10

## 2021-10-01 RX ADMIN — INSULIN ASPART 5 UNITS: 100 INJECTION, SOLUTION INTRAVENOUS; SUBCUTANEOUS at 12:10

## 2021-10-01 RX ADMIN — HYDROCODONE BITARTRATE AND ACETAMINOPHEN 1 TABLET: 10; 325 TABLET ORAL at 07:10

## 2021-10-01 RX ADMIN — IPRATROPIUM BROMIDE AND ALBUTEROL 1 PUFF: 20; 100 SPRAY, METERED RESPIRATORY (INHALATION) at 03:10

## 2021-10-01 RX ADMIN — HEPARIN SODIUM 5000 UNITS: 5000 INJECTION INTRAVENOUS; SUBCUTANEOUS at 01:10

## 2021-10-01 RX ADMIN — HYDROCODONE BITARTRATE AND ACETAMINOPHEN 1 TABLET: 10; 325 TABLET ORAL at 09:10

## 2021-10-01 RX ADMIN — ASPIRIN 81 MG: 81 TABLET, COATED ORAL at 09:10

## 2021-10-01 RX ADMIN — CEFEPIME 2 G: 2 INJECTION, POWDER, FOR SOLUTION INTRAVENOUS at 01:10

## 2021-10-01 RX ADMIN — PREGABALIN 150 MG: 75 CAPSULE ORAL at 08:10

## 2021-10-01 RX ADMIN — INSULIN ASPART 12 UNITS: 100 INJECTION, SOLUTION INTRAVENOUS; SUBCUTANEOUS at 04:10

## 2021-10-01 RX ADMIN — INSULIN DETEMIR 18 UNITS: 100 INJECTION, SOLUTION SUBCUTANEOUS at 08:10

## 2021-10-01 RX ADMIN — PANTOPRAZOLE SODIUM 40 MG: 40 TABLET, DELAYED RELEASE ORAL at 09:10

## 2021-10-01 RX ADMIN — METHOCARBAMOL TABLETS 500 MG: 500 TABLET, COATED ORAL at 08:10

## 2021-10-01 RX ADMIN — CEFEPIME HYDROCHLORIDE 1 G: 1 INJECTION, SOLUTION INTRAVENOUS at 04:10

## 2021-10-01 RX ADMIN — LOSARTAN POTASSIUM 50 MG: 50 TABLET, FILM COATED ORAL at 09:10

## 2021-10-01 RX ADMIN — NIFEDIPINE 30 MG: 30 TABLET, FILM COATED, EXTENDED RELEASE ORAL at 08:10

## 2021-10-01 RX ADMIN — INSULIN ASPART 5 UNITS: 100 INJECTION, SOLUTION INTRAVENOUS; SUBCUTANEOUS at 09:10

## 2021-10-01 RX ADMIN — INSULIN ASPART 10 UNITS: 100 INJECTION, SOLUTION INTRAVENOUS; SUBCUTANEOUS at 04:10

## 2021-10-01 RX ADMIN — IPRATROPIUM BROMIDE AND ALBUTEROL 1 PUFF: 20; 100 SPRAY, METERED RESPIRATORY (INHALATION) at 11:10

## 2021-10-01 RX ADMIN — METHOCARBAMOL TABLETS 500 MG: 500 TABLET, COATED ORAL at 09:10

## 2021-10-01 RX ADMIN — NIFEDIPINE 30 MG: 30 TABLET, FILM COATED, EXTENDED RELEASE ORAL at 09:10

## 2021-10-01 RX ADMIN — HEPARIN SODIUM 5000 UNITS: 5000 INJECTION INTRAVENOUS; SUBCUTANEOUS at 08:10

## 2021-10-01 RX ADMIN — INSULIN ASPART 5 UNITS: 100 INJECTION, SOLUTION INTRAVENOUS; SUBCUTANEOUS at 08:10

## 2021-10-01 RX ADMIN — Medication 10 ML: at 02:10

## 2021-10-01 RX ADMIN — ACETAMINOPHEN 650 MG: 325 TABLET, FILM COATED ORAL at 12:10

## 2021-10-01 RX ADMIN — BENZONATATE 100 MG: 100 CAPSULE ORAL at 08:10

## 2021-10-01 RX ADMIN — IPRATROPIUM BROMIDE AND ALBUTEROL 1 PUFF: 20; 100 SPRAY, METERED RESPIRATORY (INHALATION) at 06:10

## 2021-10-01 RX ADMIN — GUAIFENESIN SYRUP AND DEXTROMETHORPHAN 10 ML: 100; 10 SYRUP ORAL at 09:10

## 2021-10-01 RX ADMIN — METHOCARBAMOL TABLETS 500 MG: 500 TABLET, COATED ORAL at 02:10

## 2021-10-01 RX ADMIN — Medication 10 ML: at 10:10

## 2021-10-01 RX ADMIN — SERTRALINE HYDROCHLORIDE 100 MG: 25 TABLET ORAL at 09:10

## 2021-10-01 RX ADMIN — INSULIN ASPART 6 UNITS: 100 INJECTION, SOLUTION INTRAVENOUS; SUBCUTANEOUS at 12:10

## 2021-10-02 LAB
ANION GAP SERPL CALC-SCNC: 9 MMOL/L (ref 8–16)
BASOPHILS # BLD AUTO: 0.03 K/UL (ref 0–0.2)
BASOPHILS NFR BLD: 0.2 % (ref 0–1.9)
BUN SERPL-MCNC: 19 MG/DL (ref 8–23)
CALCIUM SERPL-MCNC: 8.7 MG/DL (ref 8.7–10.5)
CHLORIDE SERPL-SCNC: 107 MMOL/L (ref 95–110)
CO2 SERPL-SCNC: 20 MMOL/L (ref 23–29)
CREAT SERPL-MCNC: 1.1 MG/DL (ref 0.5–1.4)
DIFFERENTIAL METHOD: ABNORMAL
EOSINOPHIL # BLD AUTO: 0.5 K/UL (ref 0–0.5)
EOSINOPHIL NFR BLD: 4 % (ref 0–8)
ERYTHROCYTE [DISTWIDTH] IN BLOOD BY AUTOMATED COUNT: 14.6 % (ref 11.5–14.5)
EST. GFR  (AFRICAN AMERICAN): 57 ML/MIN/1.73 M^2
EST. GFR  (NON AFRICAN AMERICAN): 49 ML/MIN/1.73 M^2
GLUCOSE SERPL-MCNC: 235 MG/DL (ref 70–110)
HCT VFR BLD AUTO: 28 % (ref 37–48.5)
HGB BLD-MCNC: 8.8 G/DL (ref 12–16)
IMM GRANULOCYTES # BLD AUTO: 0.07 K/UL (ref 0–0.04)
IMM GRANULOCYTES NFR BLD AUTO: 0.6 % (ref 0–0.5)
LYMPHOCYTES # BLD AUTO: 2.3 K/UL (ref 1–4.8)
LYMPHOCYTES NFR BLD: 18.9 % (ref 18–48)
MAGNESIUM SERPL-MCNC: 2 MG/DL (ref 1.6–2.6)
MCH RBC QN AUTO: 28.9 PG (ref 27–31)
MCHC RBC AUTO-ENTMCNC: 31.4 G/DL (ref 32–36)
MCV RBC AUTO: 92 FL (ref 82–98)
MONOCYTES # BLD AUTO: 0.8 K/UL (ref 0.3–1)
MONOCYTES NFR BLD: 6.6 % (ref 4–15)
NEUTROPHILS # BLD AUTO: 8.4 K/UL (ref 1.8–7.7)
NEUTROPHILS NFR BLD: 69.7 % (ref 38–73)
NRBC BLD-RTO: 0 /100 WBC
PLATELET # BLD AUTO: 311 K/UL (ref 150–450)
PMV BLD AUTO: 11.8 FL (ref 9.2–12.9)
POCT GLUCOSE: 241 MG/DL (ref 70–110)
POCT GLUCOSE: 246 MG/DL (ref 70–110)
POCT GLUCOSE: 72 MG/DL (ref 70–110)
POCT GLUCOSE: 90 MG/DL (ref 70–110)
POTASSIUM SERPL-SCNC: 4.3 MMOL/L (ref 3.5–5.1)
PROCALCITONIN SERPL IA-MCNC: 0.21 NG/ML
RBC # BLD AUTO: 3.04 M/UL (ref 4–5.4)
SODIUM SERPL-SCNC: 136 MMOL/L (ref 136–145)
WBC # BLD AUTO: 12.11 K/UL (ref 3.9–12.7)

## 2021-10-02 PROCEDURE — 63600175 PHARM REV CODE 636 W HCPCS: Performed by: INTERNAL MEDICINE

## 2021-10-02 PROCEDURE — 85025 COMPLETE CBC W/AUTO DIFF WBC: CPT | Performed by: PHYSICIAN ASSISTANT

## 2021-10-02 PROCEDURE — 99226 PR SUBSEQUENT OBSERVATION CARE,LEVEL III: ICD-10-PCS | Mod: ,,, | Performed by: INTERNAL MEDICINE

## 2021-10-02 PROCEDURE — 80048 BASIC METABOLIC PNL TOTAL CA: CPT | Performed by: PHYSICIAN ASSISTANT

## 2021-10-02 PROCEDURE — 25000003 PHARM REV CODE 250: Performed by: PHYSICIAN ASSISTANT

## 2021-10-02 PROCEDURE — 94761 N-INVAS EAR/PLS OXIMETRY MLT: CPT

## 2021-10-02 PROCEDURE — G0378 HOSPITAL OBSERVATION PER HR: HCPCS

## 2021-10-02 PROCEDURE — 83735 ASSAY OF MAGNESIUM: CPT | Performed by: PHYSICIAN ASSISTANT

## 2021-10-02 PROCEDURE — A4216 STERILE WATER/SALINE, 10 ML: HCPCS | Performed by: PHYSICIAN ASSISTANT

## 2021-10-02 PROCEDURE — 63600175 PHARM REV CODE 636 W HCPCS: Performed by: PHYSICIAN ASSISTANT

## 2021-10-02 PROCEDURE — 25000003 PHARM REV CODE 250: Performed by: INTERNAL MEDICINE

## 2021-10-02 PROCEDURE — 25000242 PHARM REV CODE 250 ALT 637 W/ HCPCS: Performed by: PHYSICIAN ASSISTANT

## 2021-10-02 PROCEDURE — 84145 PROCALCITONIN (PCT): CPT | Performed by: INTERNAL MEDICINE

## 2021-10-02 PROCEDURE — 94640 AIRWAY INHALATION TREATMENT: CPT

## 2021-10-02 PROCEDURE — 27000221 HC OXYGEN, UP TO 24 HOURS

## 2021-10-02 PROCEDURE — 36415 COLL VENOUS BLD VENIPUNCTURE: CPT | Performed by: INTERNAL MEDICINE

## 2021-10-02 PROCEDURE — 99226 PR SUBSEQUENT OBSERVATION CARE,LEVEL III: CPT | Mod: ,,, | Performed by: INTERNAL MEDICINE

## 2021-10-02 RX ORDER — INSULIN ASPART 100 [IU]/ML
12 INJECTION, SOLUTION INTRAVENOUS; SUBCUTANEOUS
Status: DISCONTINUED | OUTPATIENT
Start: 2021-10-02 | End: 2021-10-08 | Stop reason: HOSPADM

## 2021-10-02 RX ORDER — COLCHICINE 0.6 MG/1
0.6 TABLET, FILM COATED ORAL 2 TIMES DAILY
Status: DISCONTINUED | OUTPATIENT
Start: 2021-10-02 | End: 2021-10-07

## 2021-10-02 RX ORDER — INSULIN ASPART 100 [IU]/ML
14 INJECTION, SOLUTION INTRAVENOUS; SUBCUTANEOUS
Status: DISCONTINUED | OUTPATIENT
Start: 2021-10-02 | End: 2021-10-02

## 2021-10-02 RX ADMIN — HEPARIN SODIUM 5000 UNITS: 5000 INJECTION INTRAVENOUS; SUBCUTANEOUS at 09:10

## 2021-10-02 RX ADMIN — METHOCARBAMOL TABLETS 500 MG: 500 TABLET, COATED ORAL at 09:10

## 2021-10-02 RX ADMIN — INSULIN ASPART 4 UNITS: 100 INJECTION, SOLUTION INTRAVENOUS; SUBCUTANEOUS at 08:10

## 2021-10-02 RX ADMIN — IPRATROPIUM BROMIDE AND ALBUTEROL 1 PUFF: 20; 100 SPRAY, METERED RESPIRATORY (INHALATION) at 07:10

## 2021-10-02 RX ADMIN — FUROSEMIDE 40 MG: 10 INJECTION, SOLUTION INTRAMUSCULAR; INTRAVENOUS at 05:10

## 2021-10-02 RX ADMIN — BENZONATATE 100 MG: 100 CAPSULE ORAL at 12:10

## 2021-10-02 RX ADMIN — GUAIFENESIN SYRUP AND DEXTROMETHORPHAN 10 ML: 100; 10 SYRUP ORAL at 05:10

## 2021-10-02 RX ADMIN — GUAIFENESIN SYRUP AND DEXTROMETHORPHAN 10 ML: 100; 10 SYRUP ORAL at 09:10

## 2021-10-02 RX ADMIN — PREGABALIN 150 MG: 75 CAPSULE ORAL at 08:10

## 2021-10-02 RX ADMIN — PREGABALIN 150 MG: 75 CAPSULE ORAL at 02:10

## 2021-10-02 RX ADMIN — METHOCARBAMOL TABLETS 500 MG: 500 TABLET, COATED ORAL at 08:10

## 2021-10-02 RX ADMIN — HEPARIN SODIUM 5000 UNITS: 5000 INJECTION INTRAVENOUS; SUBCUTANEOUS at 05:10

## 2021-10-02 RX ADMIN — HEPARIN SODIUM 5000 UNITS: 5000 INJECTION INTRAVENOUS; SUBCUTANEOUS at 01:10

## 2021-10-02 RX ADMIN — SERTRALINE HYDROCHLORIDE 100 MG: 25 TABLET ORAL at 08:10

## 2021-10-02 RX ADMIN — IPRATROPIUM BROMIDE AND ALBUTEROL 1 PUFF: 20; 100 SPRAY, METERED RESPIRATORY (INHALATION) at 01:10

## 2021-10-02 RX ADMIN — Medication 10 ML: at 06:10

## 2021-10-02 RX ADMIN — Medication 6 MG: at 01:10

## 2021-10-02 RX ADMIN — Medication 10 ML: at 10:10

## 2021-10-02 RX ADMIN — Medication 10 ML: at 01:10

## 2021-10-02 RX ADMIN — PREGABALIN 150 MG: 75 CAPSULE ORAL at 09:10

## 2021-10-02 RX ADMIN — LOSARTAN POTASSIUM 50 MG: 50 TABLET, FILM COATED ORAL at 09:10

## 2021-10-02 RX ADMIN — FUROSEMIDE 40 MG: 10 INJECTION, SOLUTION INTRAMUSCULAR; INTRAVENOUS at 09:10

## 2021-10-02 RX ADMIN — CEFEPIME 2 G: 2 INJECTION, POWDER, FOR SOLUTION INTRAVENOUS at 12:10

## 2021-10-02 RX ADMIN — COLCHICINE 0.6 MG: 0.6 TABLET, FILM COATED ORAL at 06:10

## 2021-10-02 RX ADMIN — TRIAMCINOLONE ACETONIDE: 1 CREAM TOPICAL at 10:10

## 2021-10-02 RX ADMIN — TRIAMCINOLONE ACETONIDE: 1 CREAM TOPICAL at 08:10

## 2021-10-02 RX ADMIN — INSULIN ASPART 14 UNITS: 100 INJECTION, SOLUTION INTRAVENOUS; SUBCUTANEOUS at 08:10

## 2021-10-02 RX ADMIN — INSULIN ASPART 2 UNITS: 100 INJECTION, SOLUTION INTRAVENOUS; SUBCUTANEOUS at 09:10

## 2021-10-02 RX ADMIN — Medication 6 MG: at 09:10

## 2021-10-02 RX ADMIN — HYDROCODONE BITARTRATE AND ACETAMINOPHEN 1 TABLET: 10; 325 TABLET ORAL at 05:10

## 2021-10-02 RX ADMIN — PANTOPRAZOLE SODIUM 40 MG: 40 TABLET, DELAYED RELEASE ORAL at 08:10

## 2021-10-02 RX ADMIN — METHOCARBAMOL TABLETS 500 MG: 500 TABLET, COATED ORAL at 02:10

## 2021-10-02 RX ADMIN — ASPIRIN 81 MG: 81 TABLET, COATED ORAL at 08:10

## 2021-10-02 RX ADMIN — GUAIFENESIN SYRUP AND DEXTROMETHORPHAN 10 ML: 100; 10 SYRUP ORAL at 01:10

## 2021-10-03 LAB
ANION GAP SERPL CALC-SCNC: 11 MMOL/L (ref 8–16)
BASOPHILS # BLD AUTO: 0.04 K/UL (ref 0–0.2)
BASOPHILS NFR BLD: 0.3 % (ref 0–1.9)
BUN SERPL-MCNC: 19 MG/DL (ref 8–23)
CALCIUM SERPL-MCNC: 8.3 MG/DL (ref 8.7–10.5)
CHLORIDE SERPL-SCNC: 106 MMOL/L (ref 95–110)
CO2 SERPL-SCNC: 19 MMOL/L (ref 23–29)
CREAT SERPL-MCNC: 1.2 MG/DL (ref 0.5–1.4)
DIFFERENTIAL METHOD: ABNORMAL
EOSINOPHIL # BLD AUTO: 0.4 K/UL (ref 0–0.5)
EOSINOPHIL NFR BLD: 3.3 % (ref 0–8)
ERYTHROCYTE [DISTWIDTH] IN BLOOD BY AUTOMATED COUNT: 14.6 % (ref 11.5–14.5)
EST. GFR  (AFRICAN AMERICAN): 51 ML/MIN/1.73 M^2
EST. GFR  (NON AFRICAN AMERICAN): 44 ML/MIN/1.73 M^2
GLUCOSE SERPL-MCNC: 333 MG/DL (ref 70–110)
HCT VFR BLD AUTO: 25.3 % (ref 37–48.5)
HGB BLD-MCNC: 8.1 G/DL (ref 12–16)
IMM GRANULOCYTES # BLD AUTO: 0.06 K/UL (ref 0–0.04)
IMM GRANULOCYTES NFR BLD AUTO: 0.5 % (ref 0–0.5)
LYMPHOCYTES # BLD AUTO: 2.3 K/UL (ref 1–4.8)
LYMPHOCYTES NFR BLD: 17.3 % (ref 18–48)
MAGNESIUM SERPL-MCNC: 2 MG/DL (ref 1.6–2.6)
MCH RBC QN AUTO: 29.6 PG (ref 27–31)
MCHC RBC AUTO-ENTMCNC: 32 G/DL (ref 32–36)
MCV RBC AUTO: 92 FL (ref 82–98)
MONOCYTES # BLD AUTO: 0.9 K/UL (ref 0.3–1)
MONOCYTES NFR BLD: 6.8 % (ref 4–15)
NEUTROPHILS # BLD AUTO: 9.6 K/UL (ref 1.8–7.7)
NEUTROPHILS NFR BLD: 71.8 % (ref 38–73)
NRBC BLD-RTO: 0 /100 WBC
PLATELET # BLD AUTO: 302 K/UL (ref 150–450)
PMV BLD AUTO: 11.5 FL (ref 9.2–12.9)
POCT GLUCOSE: 114 MG/DL (ref 70–110)
POCT GLUCOSE: 162 MG/DL (ref 70–110)
POCT GLUCOSE: 252 MG/DL (ref 70–110)
POCT GLUCOSE: 65 MG/DL (ref 70–110)
POCT GLUCOSE: 87 MG/DL (ref 70–110)
POTASSIUM SERPL-SCNC: 4.2 MMOL/L (ref 3.5–5.1)
PROCALCITONIN SERPL IA-MCNC: 0.18 NG/ML
RBC # BLD AUTO: 2.74 M/UL (ref 4–5.4)
SODIUM SERPL-SCNC: 136 MMOL/L (ref 136–145)
WBC # BLD AUTO: 13.33 K/UL (ref 3.9–12.7)

## 2021-10-03 PROCEDURE — 25000003 PHARM REV CODE 250: Performed by: PHYSICIAN ASSISTANT

## 2021-10-03 PROCEDURE — C9399 UNCLASSIFIED DRUGS OR BIOLOG: HCPCS | Performed by: NURSE PRACTITIONER

## 2021-10-03 PROCEDURE — 94640 AIRWAY INHALATION TREATMENT: CPT

## 2021-10-03 PROCEDURE — 94761 N-INVAS EAR/PLS OXIMETRY MLT: CPT

## 2021-10-03 PROCEDURE — 83735 ASSAY OF MAGNESIUM: CPT | Performed by: PHYSICIAN ASSISTANT

## 2021-10-03 PROCEDURE — 94799 UNLISTED PULMONARY SVC/PX: CPT

## 2021-10-03 PROCEDURE — A4216 STERILE WATER/SALINE, 10 ML: HCPCS | Performed by: PHYSICIAN ASSISTANT

## 2021-10-03 PROCEDURE — 27000221 HC OXYGEN, UP TO 24 HOURS

## 2021-10-03 PROCEDURE — 99226 PR SUBSEQUENT OBSERVATION CARE,LEVEL III: ICD-10-PCS | Mod: ,,, | Performed by: INTERNAL MEDICINE

## 2021-10-03 PROCEDURE — 25000003 PHARM REV CODE 250: Performed by: INTERNAL MEDICINE

## 2021-10-03 PROCEDURE — 25000242 PHARM REV CODE 250 ALT 637 W/ HCPCS: Performed by: PHYSICIAN ASSISTANT

## 2021-10-03 PROCEDURE — 85025 COMPLETE CBC W/AUTO DIFF WBC: CPT | Performed by: PHYSICIAN ASSISTANT

## 2021-10-03 PROCEDURE — 63600175 PHARM REV CODE 636 W HCPCS: Performed by: PHYSICIAN ASSISTANT

## 2021-10-03 PROCEDURE — 99226 PR SUBSEQUENT OBSERVATION CARE,LEVEL III: CPT | Mod: ,,, | Performed by: INTERNAL MEDICINE

## 2021-10-03 PROCEDURE — 80048 BASIC METABOLIC PNL TOTAL CA: CPT | Performed by: PHYSICIAN ASSISTANT

## 2021-10-03 PROCEDURE — 84145 PROCALCITONIN (PCT): CPT | Performed by: INTERNAL MEDICINE

## 2021-10-03 PROCEDURE — G0378 HOSPITAL OBSERVATION PER HR: HCPCS

## 2021-10-03 PROCEDURE — 63600175 PHARM REV CODE 636 W HCPCS: Performed by: INTERNAL MEDICINE

## 2021-10-03 PROCEDURE — 25000003 PHARM REV CODE 250: Performed by: NURSE PRACTITIONER

## 2021-10-03 PROCEDURE — 25000242 PHARM REV CODE 250 ALT 637 W/ HCPCS: Performed by: INTERNAL MEDICINE

## 2021-10-03 PROCEDURE — 36415 COLL VENOUS BLD VENIPUNCTURE: CPT | Performed by: INTERNAL MEDICINE

## 2021-10-03 RX ORDER — NIFEDIPINE 30 MG/1
30 TABLET, EXTENDED RELEASE ORAL DAILY
Status: DISCONTINUED | OUTPATIENT
Start: 2021-10-04 | End: 2021-10-08 | Stop reason: HOSPADM

## 2021-10-03 RX ORDER — CARVEDILOL 3.12 MG/1
3.12 TABLET ORAL 2 TIMES DAILY
Status: DISCONTINUED | OUTPATIENT
Start: 2021-10-03 | End: 2021-10-08 | Stop reason: HOSPADM

## 2021-10-03 RX ADMIN — PREGABALIN 150 MG: 75 CAPSULE ORAL at 08:10

## 2021-10-03 RX ADMIN — INSULIN ASPART 2 UNITS: 100 INJECTION, SOLUTION INTRAVENOUS; SUBCUTANEOUS at 02:10

## 2021-10-03 RX ADMIN — Medication 10 ML: at 10:10

## 2021-10-03 RX ADMIN — Medication 10 ML: at 03:10

## 2021-10-03 RX ADMIN — HYDROCODONE BITARTRATE AND ACETAMINOPHEN 1 TABLET: 10; 325 TABLET ORAL at 01:10

## 2021-10-03 RX ADMIN — IPRATROPIUM BROMIDE AND ALBUTEROL 1 PUFF: 20; 100 SPRAY, METERED RESPIRATORY (INHALATION) at 07:10

## 2021-10-03 RX ADMIN — COLCHICINE 0.6 MG: 0.6 TABLET, FILM COATED ORAL at 08:10

## 2021-10-03 RX ADMIN — NIFEDIPINE 30 MG: 30 TABLET, FILM COATED, EXTENDED RELEASE ORAL at 08:10

## 2021-10-03 RX ADMIN — IPRATROPIUM BROMIDE AND ALBUTEROL 1 PUFF: 20; 100 SPRAY, METERED RESPIRATORY (INHALATION) at 01:10

## 2021-10-03 RX ADMIN — GUAIFENESIN SYRUP AND DEXTROMETHORPHAN 10 ML: 100; 10 SYRUP ORAL at 01:10

## 2021-10-03 RX ADMIN — HYDROCODONE BITARTRATE AND ACETAMINOPHEN 1 TABLET: 10; 325 TABLET ORAL at 06:10

## 2021-10-03 RX ADMIN — CEFEPIME 2 G: 2 INJECTION, POWDER, FOR SOLUTION INTRAVENOUS at 01:10

## 2021-10-03 RX ADMIN — PANTOPRAZOLE SODIUM 40 MG: 40 TABLET, DELAYED RELEASE ORAL at 08:10

## 2021-10-03 RX ADMIN — CEFEPIME 2 G: 2 INJECTION, POWDER, FOR SOLUTION INTRAVENOUS at 12:10

## 2021-10-03 RX ADMIN — METHOCARBAMOL TABLETS 500 MG: 500 TABLET, COATED ORAL at 08:10

## 2021-10-03 RX ADMIN — CARVEDILOL 3.12 MG: 3.12 TABLET, FILM COATED ORAL at 08:10

## 2021-10-03 RX ADMIN — HEPARIN SODIUM 5000 UNITS: 5000 INJECTION INTRAVENOUS; SUBCUTANEOUS at 06:10

## 2021-10-03 RX ADMIN — SERTRALINE HYDROCHLORIDE 100 MG: 25 TABLET ORAL at 08:10

## 2021-10-03 RX ADMIN — PREGABALIN 150 MG: 75 CAPSULE ORAL at 03:10

## 2021-10-03 RX ADMIN — HEPARIN SODIUM 5000 UNITS: 5000 INJECTION INTRAVENOUS; SUBCUTANEOUS at 10:10

## 2021-10-03 RX ADMIN — INSULIN DETEMIR 10 UNITS: 100 INJECTION, SOLUTION SUBCUTANEOUS at 10:10

## 2021-10-03 RX ADMIN — BENZONATATE 100 MG: 100 CAPSULE ORAL at 08:10

## 2021-10-03 RX ADMIN — INSULIN ASPART 12 UNITS: 100 INJECTION, SOLUTION INTRAVENOUS; SUBCUTANEOUS at 06:10

## 2021-10-03 RX ADMIN — HYDROCODONE BITARTRATE AND ACETAMINOPHEN 1 TABLET: 10; 325 TABLET ORAL at 09:10

## 2021-10-03 RX ADMIN — Medication 10 ML: at 06:10

## 2021-10-03 RX ADMIN — TRIAMCINOLONE ACETONIDE: 1 CREAM TOPICAL at 08:10

## 2021-10-03 RX ADMIN — INSULIN ASPART 12 UNITS: 100 INJECTION, SOLUTION INTRAVENOUS; SUBCUTANEOUS at 08:10

## 2021-10-03 RX ADMIN — IPRATROPIUM BROMIDE AND ALBUTEROL 1 PUFF: 20; 100 SPRAY, METERED RESPIRATORY (INHALATION) at 03:10

## 2021-10-03 RX ADMIN — HEPARIN SODIUM 5000 UNITS: 5000 INJECTION INTRAVENOUS; SUBCUTANEOUS at 03:10

## 2021-10-03 RX ADMIN — GUAIFENESIN SYRUP AND DEXTROMETHORPHAN 10 ML: 100; 10 SYRUP ORAL at 08:10

## 2021-10-03 RX ADMIN — METHOCARBAMOL TABLETS 500 MG: 500 TABLET, COATED ORAL at 03:10

## 2021-10-03 RX ADMIN — FUROSEMIDE 40 MG: 10 INJECTION, SOLUTION INTRAMUSCULAR; INTRAVENOUS at 08:10

## 2021-10-03 RX ADMIN — INSULIN ASPART 12 UNITS: 100 INJECTION, SOLUTION INTRAVENOUS; SUBCUTANEOUS at 02:10

## 2021-10-03 RX ADMIN — ASPIRIN 81 MG: 81 TABLET, COATED ORAL at 08:10

## 2021-10-03 RX ADMIN — INSULIN ASPART 6 UNITS: 100 INJECTION, SOLUTION INTRAVENOUS; SUBCUTANEOUS at 08:10

## 2021-10-03 RX ADMIN — LOSARTAN POTASSIUM 50 MG: 50 TABLET, FILM COATED ORAL at 08:10

## 2021-10-03 RX ADMIN — FUROSEMIDE 40 MG: 10 INJECTION, SOLUTION INTRAMUSCULAR; INTRAVENOUS at 06:10

## 2021-10-04 ENCOUNTER — DOCUMENT SCAN (OUTPATIENT)
Dept: HOME HEALTH SERVICES | Facility: HOSPITAL | Age: 76
End: 2021-10-04
Payer: MEDICARE

## 2021-10-04 LAB
ANION GAP SERPL CALC-SCNC: 12 MMOL/L (ref 8–16)
BASOPHILS # BLD AUTO: 0.03 K/UL (ref 0–0.2)
BASOPHILS NFR BLD: 0.3 % (ref 0–1.9)
BUN SERPL-MCNC: 16 MG/DL (ref 8–23)
CALCIUM SERPL-MCNC: 8.6 MG/DL (ref 8.7–10.5)
CHLORIDE SERPL-SCNC: 105 MMOL/L (ref 95–110)
CO2 SERPL-SCNC: 21 MMOL/L (ref 23–29)
CREAT SERPL-MCNC: 0.9 MG/DL (ref 0.5–1.4)
DIFFERENTIAL METHOD: ABNORMAL
EOSINOPHIL # BLD AUTO: 0.4 K/UL (ref 0–0.5)
EOSINOPHIL NFR BLD: 3.5 % (ref 0–8)
ERYTHROCYTE [DISTWIDTH] IN BLOOD BY AUTOMATED COUNT: 14.1 % (ref 11.5–14.5)
EST. GFR  (AFRICAN AMERICAN): >60 ML/MIN/1.73 M^2
EST. GFR  (NON AFRICAN AMERICAN): >60 ML/MIN/1.73 M^2
FINAL PATHOLOGIC DIAGNOSIS: NORMAL
GLUCOSE SERPL-MCNC: 225 MG/DL (ref 70–110)
GROSS: NORMAL
HCT VFR BLD AUTO: 27.1 % (ref 37–48.5)
HGB BLD-MCNC: 8.9 G/DL (ref 12–16)
IMM GRANULOCYTES # BLD AUTO: 0.08 K/UL (ref 0–0.04)
IMM GRANULOCYTES NFR BLD AUTO: 0.7 % (ref 0–0.5)
LYMPHOCYTES # BLD AUTO: 2.2 K/UL (ref 1–4.8)
LYMPHOCYTES NFR BLD: 20.4 % (ref 18–48)
Lab: NORMAL
MAGNESIUM SERPL-MCNC: 1.8 MG/DL (ref 1.6–2.6)
MCH RBC QN AUTO: 29.7 PG (ref 27–31)
MCHC RBC AUTO-ENTMCNC: 32.8 G/DL (ref 32–36)
MCV RBC AUTO: 90 FL (ref 82–98)
MICROSCOPIC EXAM: NORMAL
MONOCYTES # BLD AUTO: 0.9 K/UL (ref 0.3–1)
MONOCYTES NFR BLD: 8.3 % (ref 4–15)
NEUTROPHILS # BLD AUTO: 7.2 K/UL (ref 1.8–7.7)
NEUTROPHILS NFR BLD: 66.8 % (ref 38–73)
NRBC BLD-RTO: 0 /100 WBC
PLATELET # BLD AUTO: 348 K/UL (ref 150–450)
PMV BLD AUTO: 11.1 FL (ref 9.2–12.9)
POCT GLUCOSE: 144 MG/DL (ref 70–110)
POCT GLUCOSE: 152 MG/DL (ref 70–110)
POCT GLUCOSE: 240 MG/DL (ref 70–110)
POCT GLUCOSE: 83 MG/DL (ref 70–110)
POTASSIUM SERPL-SCNC: 3.9 MMOL/L (ref 3.5–5.1)
RBC # BLD AUTO: 3 M/UL (ref 4–5.4)
SODIUM SERPL-SCNC: 138 MMOL/L (ref 136–145)
SUPPLEMENTAL DIAGNOSIS: NORMAL
WBC # BLD AUTO: 10.74 K/UL (ref 3.9–12.7)

## 2021-10-04 PROCEDURE — 25000003 PHARM REV CODE 250: Performed by: PHYSICIAN ASSISTANT

## 2021-10-04 PROCEDURE — 21400001 HC TELEMETRY ROOM

## 2021-10-04 PROCEDURE — 25000003 PHARM REV CODE 250: Performed by: INTERNAL MEDICINE

## 2021-10-04 PROCEDURE — 63600175 PHARM REV CODE 636 W HCPCS: Performed by: PHYSICIAN ASSISTANT

## 2021-10-04 PROCEDURE — 36410 VNPNXR 3YR/> PHY/QHP DX/THER: CPT

## 2021-10-04 PROCEDURE — A4216 STERILE WATER/SALINE, 10 ML: HCPCS | Performed by: PHYSICIAN ASSISTANT

## 2021-10-04 PROCEDURE — 80048 BASIC METABOLIC PNL TOTAL CA: CPT | Performed by: PHYSICIAN ASSISTANT

## 2021-10-04 PROCEDURE — 85025 COMPLETE CBC W/AUTO DIFF WBC: CPT | Performed by: PHYSICIAN ASSISTANT

## 2021-10-04 PROCEDURE — C1751 CATH, INF, PER/CENT/MIDLINE: HCPCS

## 2021-10-04 PROCEDURE — 63600175 PHARM REV CODE 636 W HCPCS: Performed by: INTERNAL MEDICINE

## 2021-10-04 PROCEDURE — 83735 ASSAY OF MAGNESIUM: CPT | Performed by: PHYSICIAN ASSISTANT

## 2021-10-04 PROCEDURE — 99233 SBSQ HOSP IP/OBS HIGH 50: CPT | Mod: ,,, | Performed by: INTERNAL MEDICINE

## 2021-10-04 PROCEDURE — 92526 ORAL FUNCTION THERAPY: CPT

## 2021-10-04 PROCEDURE — 99233 PR SUBSEQUENT HOSPITAL CARE,LEVL III: ICD-10-PCS | Mod: ,,, | Performed by: INTERNAL MEDICINE

## 2021-10-04 PROCEDURE — 36415 COLL VENOUS BLD VENIPUNCTURE: CPT | Performed by: PHYSICIAN ASSISTANT

## 2021-10-04 PROCEDURE — 76937 US GUIDE VASCULAR ACCESS: CPT

## 2021-10-04 RX ORDER — SODIUM CHLORIDE 9 MG/ML
INJECTION, SOLUTION INTRAVENOUS
Status: DISCONTINUED | OUTPATIENT
Start: 2021-10-04 | End: 2021-10-08 | Stop reason: HOSPADM

## 2021-10-04 RX ADMIN — LOSARTAN POTASSIUM 50 MG: 50 TABLET, FILM COATED ORAL at 08:10

## 2021-10-04 RX ADMIN — INSULIN ASPART 12 UNITS: 100 INJECTION, SOLUTION INTRAVENOUS; SUBCUTANEOUS at 01:10

## 2021-10-04 RX ADMIN — METHOCARBAMOL TABLETS 500 MG: 500 TABLET, COATED ORAL at 08:10

## 2021-10-04 RX ADMIN — GUAIFENESIN SYRUP AND DEXTROMETHORPHAN 10 ML: 100; 10 SYRUP ORAL at 09:10

## 2021-10-04 RX ADMIN — SERTRALINE HYDROCHLORIDE 100 MG: 25 TABLET ORAL at 08:10

## 2021-10-04 RX ADMIN — PREGABALIN 150 MG: 75 CAPSULE ORAL at 08:10

## 2021-10-04 RX ADMIN — SODIUM CHLORIDE: 0.9 INJECTION, SOLUTION INTRAVENOUS at 01:10

## 2021-10-04 RX ADMIN — INSULIN ASPART 4 UNITS: 100 INJECTION, SOLUTION INTRAVENOUS; SUBCUTANEOUS at 08:10

## 2021-10-04 RX ADMIN — METHOCARBAMOL TABLETS 500 MG: 500 TABLET, COATED ORAL at 04:10

## 2021-10-04 RX ADMIN — CARVEDILOL 3.12 MG: 3.12 TABLET, FILM COATED ORAL at 08:10

## 2021-10-04 RX ADMIN — HYDROCODONE BITARTRATE AND ACETAMINOPHEN 1 TABLET: 10; 325 TABLET ORAL at 05:10

## 2021-10-04 RX ADMIN — ASPIRIN 81 MG: 81 TABLET, COATED ORAL at 08:10

## 2021-10-04 RX ADMIN — HEPARIN SODIUM 5000 UNITS: 5000 INJECTION INTRAVENOUS; SUBCUTANEOUS at 05:10

## 2021-10-04 RX ADMIN — PREGABALIN 150 MG: 75 CAPSULE ORAL at 04:10

## 2021-10-04 RX ADMIN — COLCHICINE 0.6 MG: 0.6 TABLET, FILM COATED ORAL at 08:10

## 2021-10-04 RX ADMIN — INSULIN ASPART 12 UNITS: 100 INJECTION, SOLUTION INTRAVENOUS; SUBCUTANEOUS at 08:10

## 2021-10-04 RX ADMIN — ATORVASTATIN CALCIUM 40 MG: 20 TABLET, FILM COATED ORAL at 08:10

## 2021-10-04 RX ADMIN — TRIAMCINOLONE ACETONIDE: 1 CREAM TOPICAL at 08:10

## 2021-10-04 RX ADMIN — TRIAMCINOLONE ACETONIDE: 1 CREAM TOPICAL at 11:10

## 2021-10-04 RX ADMIN — HEPARIN SODIUM 5000 UNITS: 5000 INJECTION INTRAVENOUS; SUBCUTANEOUS at 09:10

## 2021-10-04 RX ADMIN — NIFEDIPINE 30 MG: 30 TABLET, FILM COATED, EXTENDED RELEASE ORAL at 08:10

## 2021-10-04 RX ADMIN — FUROSEMIDE 40 MG: 10 INJECTION, SOLUTION INTRAMUSCULAR; INTRAVENOUS at 04:10

## 2021-10-04 RX ADMIN — INSULIN ASPART 12 UNITS: 100 INJECTION, SOLUTION INTRAVENOUS; SUBCUTANEOUS at 11:10

## 2021-10-04 RX ADMIN — HEPARIN SODIUM 5000 UNITS: 5000 INJECTION INTRAVENOUS; SUBCUTANEOUS at 01:10

## 2021-10-04 RX ADMIN — CEFEPIME 2 G: 2 INJECTION, POWDER, FOR SOLUTION INTRAVENOUS at 01:10

## 2021-10-04 RX ADMIN — INSULIN ASPART 1 UNITS: 100 INJECTION, SOLUTION INTRAVENOUS; SUBCUTANEOUS at 09:10

## 2021-10-04 RX ADMIN — HYDROCODONE BITARTRATE AND ACETAMINOPHEN 1 TABLET: 10; 325 TABLET ORAL at 01:10

## 2021-10-04 RX ADMIN — HYDROCODONE BITARTRATE AND ACETAMINOPHEN 1 TABLET: 10; 325 TABLET ORAL at 10:10

## 2021-10-04 RX ADMIN — Medication 10 ML: at 09:10

## 2021-10-04 RX ADMIN — BENZONATATE 100 MG: 100 CAPSULE ORAL at 09:10

## 2021-10-04 RX ADMIN — BENZONATATE 100 MG: 100 CAPSULE ORAL at 05:10

## 2021-10-04 RX ADMIN — GUAIFENESIN SYRUP AND DEXTROMETHORPHAN 10 ML: 100; 10 SYRUP ORAL at 05:10

## 2021-10-04 RX ADMIN — PANTOPRAZOLE SODIUM 40 MG: 40 TABLET, DELAYED RELEASE ORAL at 08:10

## 2021-10-04 RX ADMIN — Medication 10 ML: at 04:10

## 2021-10-04 RX ADMIN — Medication 10 ML: at 05:10

## 2021-10-04 RX ADMIN — CEFEPIME 2 G: 2 INJECTION, POWDER, FOR SOLUTION INTRAVENOUS at 02:10

## 2021-10-05 ENCOUNTER — PATIENT OUTREACH (OUTPATIENT)
Dept: ADMINISTRATIVE | Facility: OTHER | Age: 76
End: 2021-10-05

## 2021-10-05 LAB
ANION GAP SERPL CALC-SCNC: 11 MMOL/L (ref 8–16)
BASOPHILS # BLD AUTO: 0.04 K/UL (ref 0–0.2)
BASOPHILS NFR BLD: 0.4 % (ref 0–1.9)
BNP SERPL-MCNC: 172 PG/ML (ref 0–99)
BUN SERPL-MCNC: 14 MG/DL (ref 8–23)
CALCIUM SERPL-MCNC: 8.5 MG/DL (ref 8.7–10.5)
CHLORIDE SERPL-SCNC: 104 MMOL/L (ref 95–110)
CO2 SERPL-SCNC: 23 MMOL/L (ref 23–29)
CREAT SERPL-MCNC: 0.8 MG/DL (ref 0.5–1.4)
DIFFERENTIAL METHOD: ABNORMAL
EOSINOPHIL # BLD AUTO: 0.4 K/UL (ref 0–0.5)
EOSINOPHIL NFR BLD: 4.3 % (ref 0–8)
ERYTHROCYTE [DISTWIDTH] IN BLOOD BY AUTOMATED COUNT: 13.8 % (ref 11.5–14.5)
EST. GFR  (AFRICAN AMERICAN): >60 ML/MIN/1.73 M^2
EST. GFR  (NON AFRICAN AMERICAN): >60 ML/MIN/1.73 M^2
GLUCOSE SERPL-MCNC: 161 MG/DL (ref 70–110)
HCT VFR BLD AUTO: 27.2 % (ref 37–48.5)
HGB BLD-MCNC: 8.9 G/DL (ref 12–16)
IMM GRANULOCYTES # BLD AUTO: 0.06 K/UL (ref 0–0.04)
IMM GRANULOCYTES NFR BLD AUTO: 0.6 % (ref 0–0.5)
LYMPHOCYTES # BLD AUTO: 2.7 K/UL (ref 1–4.8)
LYMPHOCYTES NFR BLD: 27.1 % (ref 18–48)
MAGNESIUM SERPL-MCNC: 1.7 MG/DL (ref 1.6–2.6)
MCH RBC QN AUTO: 29.8 PG (ref 27–31)
MCHC RBC AUTO-ENTMCNC: 32.7 G/DL (ref 32–36)
MCV RBC AUTO: 91 FL (ref 82–98)
MONOCYTES # BLD AUTO: 0.8 K/UL (ref 0.3–1)
MONOCYTES NFR BLD: 8.3 % (ref 4–15)
NEUTROPHILS # BLD AUTO: 6 K/UL (ref 1.8–7.7)
NEUTROPHILS NFR BLD: 59.3 % (ref 38–73)
NRBC BLD-RTO: 0 /100 WBC
PLATELET # BLD AUTO: 343 K/UL (ref 150–450)
PMV BLD AUTO: 11 FL (ref 9.2–12.9)
POCT GLUCOSE: 131 MG/DL (ref 70–110)
POCT GLUCOSE: 157 MG/DL (ref 70–110)
POCT GLUCOSE: 158 MG/DL (ref 70–110)
POCT GLUCOSE: 162 MG/DL (ref 70–110)
POCT GLUCOSE: 170 MG/DL (ref 70–110)
POCT GLUCOSE: 182 MG/DL (ref 70–110)
POCT GLUCOSE: 75 MG/DL (ref 70–110)
POTASSIUM SERPL-SCNC: 3.9 MMOL/L (ref 3.5–5.1)
RBC # BLD AUTO: 2.99 M/UL (ref 4–5.4)
SODIUM SERPL-SCNC: 138 MMOL/L (ref 136–145)
WBC # BLD AUTO: 10.06 K/UL (ref 3.9–12.7)

## 2021-10-05 PROCEDURE — 94761 N-INVAS EAR/PLS OXIMETRY MLT: CPT

## 2021-10-05 PROCEDURE — 85025 COMPLETE CBC W/AUTO DIFF WBC: CPT | Performed by: PHYSICIAN ASSISTANT

## 2021-10-05 PROCEDURE — 80048 BASIC METABOLIC PNL TOTAL CA: CPT | Performed by: PHYSICIAN ASSISTANT

## 2021-10-05 PROCEDURE — 83735 ASSAY OF MAGNESIUM: CPT | Performed by: PHYSICIAN ASSISTANT

## 2021-10-05 PROCEDURE — 25000003 PHARM REV CODE 250: Performed by: PHYSICIAN ASSISTANT

## 2021-10-05 PROCEDURE — 92526 ORAL FUNCTION THERAPY: CPT

## 2021-10-05 PROCEDURE — 63600175 PHARM REV CODE 636 W HCPCS: Performed by: INTERNAL MEDICINE

## 2021-10-05 PROCEDURE — 25000003 PHARM REV CODE 250: Performed by: INTERNAL MEDICINE

## 2021-10-05 PROCEDURE — 63600175 PHARM REV CODE 636 W HCPCS: Performed by: PHYSICIAN ASSISTANT

## 2021-10-05 PROCEDURE — 27000221 HC OXYGEN, UP TO 24 HOURS

## 2021-10-05 PROCEDURE — A4216 STERILE WATER/SALINE, 10 ML: HCPCS | Performed by: PHYSICIAN ASSISTANT

## 2021-10-05 PROCEDURE — 99232 PR SUBSEQUENT HOSPITAL CARE,LEVL II: ICD-10-PCS | Mod: ,,, | Performed by: INTERNAL MEDICINE

## 2021-10-05 PROCEDURE — 21400001 HC TELEMETRY ROOM

## 2021-10-05 PROCEDURE — 83880 ASSAY OF NATRIURETIC PEPTIDE: CPT | Performed by: INTERNAL MEDICINE

## 2021-10-05 PROCEDURE — 63600175 PHARM REV CODE 636 W HCPCS: Performed by: NURSE PRACTITIONER

## 2021-10-05 PROCEDURE — 99232 SBSQ HOSP IP/OBS MODERATE 35: CPT | Mod: ,,, | Performed by: INTERNAL MEDICINE

## 2021-10-05 RX ORDER — MORPHINE SULFATE 2 MG/ML
2 INJECTION, SOLUTION INTRAMUSCULAR; INTRAVENOUS ONCE
Status: COMPLETED | OUTPATIENT
Start: 2021-10-05 | End: 2021-10-05

## 2021-10-05 RX ADMIN — SODIUM CHLORIDE: 0.9 INJECTION, SOLUTION INTRAVENOUS at 02:10

## 2021-10-05 RX ADMIN — INSULIN ASPART 2 UNITS: 100 INJECTION, SOLUTION INTRAVENOUS; SUBCUTANEOUS at 02:10

## 2021-10-05 RX ADMIN — INSULIN ASPART 12 UNITS: 100 INJECTION, SOLUTION INTRAVENOUS; SUBCUTANEOUS at 02:10

## 2021-10-05 RX ADMIN — TRIAMCINOLONE ACETONIDE: 1 CREAM TOPICAL at 12:10

## 2021-10-05 RX ADMIN — METHOCARBAMOL TABLETS 500 MG: 500 TABLET, COATED ORAL at 10:10

## 2021-10-05 RX ADMIN — PANTOPRAZOLE SODIUM 40 MG: 40 TABLET, DELAYED RELEASE ORAL at 10:10

## 2021-10-05 RX ADMIN — PREGABALIN 150 MG: 75 CAPSULE ORAL at 06:10

## 2021-10-05 RX ADMIN — Medication 6 MG: at 08:10

## 2021-10-05 RX ADMIN — CEFEPIME 2 G: 2 INJECTION, POWDER, FOR SOLUTION INTRAVENOUS at 02:10

## 2021-10-05 RX ADMIN — FUROSEMIDE 40 MG: 10 INJECTION, SOLUTION INTRAMUSCULAR; INTRAVENOUS at 10:10

## 2021-10-05 RX ADMIN — HEPARIN SODIUM 5000 UNITS: 5000 INJECTION INTRAVENOUS; SUBCUTANEOUS at 10:10

## 2021-10-05 RX ADMIN — CARVEDILOL 3.12 MG: 3.12 TABLET, FILM COATED ORAL at 10:10

## 2021-10-05 RX ADMIN — LOSARTAN POTASSIUM 50 MG: 50 TABLET, FILM COATED ORAL at 10:10

## 2021-10-05 RX ADMIN — INSULIN ASPART 12 UNITS: 100 INJECTION, SOLUTION INTRAVENOUS; SUBCUTANEOUS at 06:10

## 2021-10-05 RX ADMIN — COLCHICINE 0.6 MG: 0.6 TABLET, FILM COATED ORAL at 08:10

## 2021-10-05 RX ADMIN — CEFEPIME 2 G: 2 INJECTION, POWDER, FOR SOLUTION INTRAVENOUS at 01:10

## 2021-10-05 RX ADMIN — PREGABALIN 150 MG: 75 CAPSULE ORAL at 10:10

## 2021-10-05 RX ADMIN — CARVEDILOL 3.12 MG: 3.12 TABLET, FILM COATED ORAL at 08:10

## 2021-10-05 RX ADMIN — Medication 10 ML: at 02:10

## 2021-10-05 RX ADMIN — HEPARIN SODIUM 5000 UNITS: 5000 INJECTION INTRAVENOUS; SUBCUTANEOUS at 02:10

## 2021-10-05 RX ADMIN — SERTRALINE HYDROCHLORIDE 100 MG: 25 TABLET ORAL at 10:10

## 2021-10-05 RX ADMIN — Medication 6 MG: at 01:10

## 2021-10-05 RX ADMIN — METHOCARBAMOL TABLETS 500 MG: 500 TABLET, COATED ORAL at 06:10

## 2021-10-05 RX ADMIN — FUROSEMIDE 40 MG: 10 INJECTION, SOLUTION INTRAMUSCULAR; INTRAVENOUS at 06:10

## 2021-10-05 RX ADMIN — GUAIFENESIN SYRUP AND DEXTROMETHORPHAN 10 ML: 100; 10 SYRUP ORAL at 02:10

## 2021-10-05 RX ADMIN — HYDROCODONE BITARTRATE AND ACETAMINOPHEN 1 TABLET: 10; 325 TABLET ORAL at 11:10

## 2021-10-05 RX ADMIN — TRIAMCINOLONE ACETONIDE: 1 CREAM TOPICAL at 10:10

## 2021-10-05 RX ADMIN — Medication 10 ML: at 10:10

## 2021-10-05 RX ADMIN — INSULIN ASPART 12 UNITS: 100 INJECTION, SOLUTION INTRAVENOUS; SUBCUTANEOUS at 10:10

## 2021-10-05 RX ADMIN — INSULIN ASPART 2 UNITS: 100 INJECTION, SOLUTION INTRAVENOUS; SUBCUTANEOUS at 10:10

## 2021-10-05 RX ADMIN — Medication 10 ML: at 05:10

## 2021-10-05 RX ADMIN — ASPIRIN 81 MG: 81 TABLET, COATED ORAL at 10:10

## 2021-10-05 RX ADMIN — HEPARIN SODIUM 5000 UNITS: 5000 INJECTION INTRAVENOUS; SUBCUTANEOUS at 05:10

## 2021-10-05 RX ADMIN — BENZONATATE 100 MG: 100 CAPSULE ORAL at 10:10

## 2021-10-05 RX ADMIN — MORPHINE SULFATE 2 MG: 2 INJECTION, SOLUTION INTRAMUSCULAR; INTRAVENOUS at 01:10

## 2021-10-05 RX ADMIN — BENZONATATE 100 MG: 100 CAPSULE ORAL at 05:10

## 2021-10-05 RX ADMIN — COLCHICINE 0.6 MG: 0.6 TABLET, FILM COATED ORAL at 10:10

## 2021-10-05 RX ADMIN — NIFEDIPINE 30 MG: 30 TABLET, FILM COATED, EXTENDED RELEASE ORAL at 10:10

## 2021-10-05 RX ADMIN — HYDROCODONE BITARTRATE AND ACETAMINOPHEN 1 TABLET: 10; 325 TABLET ORAL at 08:10

## 2021-10-05 RX ADMIN — ATORVASTATIN CALCIUM 40 MG: 20 TABLET, FILM COATED ORAL at 10:10

## 2021-10-06 ENCOUNTER — PATIENT MESSAGE (OUTPATIENT)
Dept: DERMATOLOGY | Facility: CLINIC | Age: 76
End: 2021-10-06

## 2021-10-06 ENCOUNTER — TELEPHONE (OUTPATIENT)
Dept: INTERNAL MEDICINE | Facility: CLINIC | Age: 76
End: 2021-10-06

## 2021-10-06 ENCOUNTER — PATIENT MESSAGE (OUTPATIENT)
Dept: INTERNAL MEDICINE | Facility: CLINIC | Age: 76
End: 2021-10-06

## 2021-10-06 ENCOUNTER — PATIENT OUTREACH (OUTPATIENT)
Dept: ADMINISTRATIVE | Facility: OTHER | Age: 76
End: 2021-10-06

## 2021-10-06 LAB
ANION GAP SERPL CALC-SCNC: 8 MMOL/L (ref 8–16)
BASOPHILS # BLD AUTO: 0.03 K/UL (ref 0–0.2)
BASOPHILS NFR BLD: 0.3 % (ref 0–1.9)
BUN SERPL-MCNC: 20 MG/DL (ref 8–23)
CALCIUM SERPL-MCNC: 8.5 MG/DL (ref 8.7–10.5)
CHLORIDE SERPL-SCNC: 101 MMOL/L (ref 95–110)
CO2 SERPL-SCNC: 26 MMOL/L (ref 23–29)
CREAT SERPL-MCNC: 1.3 MG/DL (ref 0.5–1.4)
DIFFERENTIAL METHOD: ABNORMAL
EOSINOPHIL # BLD AUTO: 0.4 K/UL (ref 0–0.5)
EOSINOPHIL NFR BLD: 4.2 % (ref 0–8)
ERYTHROCYTE [DISTWIDTH] IN BLOOD BY AUTOMATED COUNT: 13.9 % (ref 11.5–14.5)
EST. GFR  (AFRICAN AMERICAN): 46 ML/MIN/1.73 M^2
EST. GFR  (NON AFRICAN AMERICAN): 40 ML/MIN/1.73 M^2
GLUCOSE SERPL-MCNC: 345 MG/DL (ref 70–110)
HCT VFR BLD AUTO: 25.5 % (ref 37–48.5)
HGB BLD-MCNC: 8.3 G/DL (ref 12–16)
IMM GRANULOCYTES # BLD AUTO: 0.12 K/UL (ref 0–0.04)
IMM GRANULOCYTES NFR BLD AUTO: 1.2 % (ref 0–0.5)
LYMPHOCYTES # BLD AUTO: 3 K/UL (ref 1–4.8)
LYMPHOCYTES NFR BLD: 28.9 % (ref 18–48)
MAGNESIUM SERPL-MCNC: 1.7 MG/DL (ref 1.6–2.6)
MCH RBC QN AUTO: 29.6 PG (ref 27–31)
MCHC RBC AUTO-ENTMCNC: 32.5 G/DL (ref 32–36)
MCV RBC AUTO: 91 FL (ref 82–98)
MONOCYTES # BLD AUTO: 1 K/UL (ref 0.3–1)
MONOCYTES NFR BLD: 9.8 % (ref 4–15)
NEUTROPHILS # BLD AUTO: 5.8 K/UL (ref 1.8–7.7)
NEUTROPHILS NFR BLD: 55.6 % (ref 38–73)
NRBC BLD-RTO: 0 /100 WBC
PLATELET # BLD AUTO: 317 K/UL (ref 150–450)
PMV BLD AUTO: 10.9 FL (ref 9.2–12.9)
POCT GLUCOSE: 157 MG/DL (ref 70–110)
POCT GLUCOSE: 161 MG/DL (ref 70–110)
POCT GLUCOSE: 283 MG/DL (ref 70–110)
POCT GLUCOSE: 352 MG/DL (ref 70–110)
POCT GLUCOSE: 372 MG/DL (ref 70–110)
POCT GLUCOSE: 54 MG/DL (ref 70–110)
POTASSIUM SERPL-SCNC: 3.9 MMOL/L (ref 3.5–5.1)
RBC # BLD AUTO: 2.8 M/UL (ref 4–5.4)
SODIUM SERPL-SCNC: 135 MMOL/L (ref 136–145)
WBC # BLD AUTO: 10.37 K/UL (ref 3.9–12.7)

## 2021-10-06 PROCEDURE — 80048 BASIC METABOLIC PNL TOTAL CA: CPT | Performed by: PHYSICIAN ASSISTANT

## 2021-10-06 PROCEDURE — 94761 N-INVAS EAR/PLS OXIMETRY MLT: CPT

## 2021-10-06 PROCEDURE — 83735 ASSAY OF MAGNESIUM: CPT | Performed by: PHYSICIAN ASSISTANT

## 2021-10-06 PROCEDURE — 99232 SBSQ HOSP IP/OBS MODERATE 35: CPT | Mod: ,,, | Performed by: INTERNAL MEDICINE

## 2021-10-06 PROCEDURE — 99232 PR SUBSEQUENT HOSPITAL CARE,LEVL II: ICD-10-PCS | Mod: ,,, | Performed by: INTERNAL MEDICINE

## 2021-10-06 PROCEDURE — 63600175 PHARM REV CODE 636 W HCPCS: Performed by: PHYSICIAN ASSISTANT

## 2021-10-06 PROCEDURE — 85025 COMPLETE CBC W/AUTO DIFF WBC: CPT | Performed by: PHYSICIAN ASSISTANT

## 2021-10-06 PROCEDURE — 25000003 PHARM REV CODE 250: Performed by: PHYSICIAN ASSISTANT

## 2021-10-06 PROCEDURE — 11000001 HC ACUTE MED/SURG PRIVATE ROOM

## 2021-10-06 PROCEDURE — 25000003 PHARM REV CODE 250: Performed by: INTERNAL MEDICINE

## 2021-10-06 PROCEDURE — 63600175 PHARM REV CODE 636 W HCPCS: Performed by: INTERNAL MEDICINE

## 2021-10-06 PROCEDURE — A4216 STERILE WATER/SALINE, 10 ML: HCPCS | Performed by: PHYSICIAN ASSISTANT

## 2021-10-06 PROCEDURE — 27000221 HC OXYGEN, UP TO 24 HOURS

## 2021-10-06 RX ADMIN — FUROSEMIDE 40 MG: 10 INJECTION, SOLUTION INTRAMUSCULAR; INTRAVENOUS at 05:10

## 2021-10-06 RX ADMIN — PREGABALIN 150 MG: 75 CAPSULE ORAL at 10:10

## 2021-10-06 RX ADMIN — COLCHICINE 0.6 MG: 0.6 TABLET, FILM COATED ORAL at 10:10

## 2021-10-06 RX ADMIN — HEPARIN SODIUM 5000 UNITS: 5000 INJECTION INTRAVENOUS; SUBCUTANEOUS at 05:10

## 2021-10-06 RX ADMIN — HYDROCODONE BITARTRATE AND ACETAMINOPHEN 1 TABLET: 10; 325 TABLET ORAL at 07:10

## 2021-10-06 RX ADMIN — FUROSEMIDE 40 MG: 10 INJECTION, SOLUTION INTRAMUSCULAR; INTRAVENOUS at 10:10

## 2021-10-06 RX ADMIN — NIFEDIPINE 30 MG: 30 TABLET, FILM COATED, EXTENDED RELEASE ORAL at 10:10

## 2021-10-06 RX ADMIN — INSULIN ASPART 10 UNITS: 100 INJECTION, SOLUTION INTRAVENOUS; SUBCUTANEOUS at 10:10

## 2021-10-06 RX ADMIN — TRIAMCINOLONE ACETONIDE: 1 CREAM TOPICAL at 10:10

## 2021-10-06 RX ADMIN — CARVEDILOL 3.12 MG: 3.12 TABLET, FILM COATED ORAL at 10:10

## 2021-10-06 RX ADMIN — PANTOPRAZOLE SODIUM 40 MG: 40 TABLET, DELAYED RELEASE ORAL at 10:10

## 2021-10-06 RX ADMIN — HEPARIN SODIUM 5000 UNITS: 5000 INJECTION INTRAVENOUS; SUBCUTANEOUS at 01:10

## 2021-10-06 RX ADMIN — METHOCARBAMOL TABLETS 500 MG: 500 TABLET, COATED ORAL at 05:10

## 2021-10-06 RX ADMIN — Medication 10 ML: at 10:10

## 2021-10-06 RX ADMIN — HEPARIN SODIUM 5000 UNITS: 5000 INJECTION INTRAVENOUS; SUBCUTANEOUS at 10:10

## 2021-10-06 RX ADMIN — HYDROCODONE BITARTRATE AND ACETAMINOPHEN 1 TABLET: 10; 325 TABLET ORAL at 11:10

## 2021-10-06 RX ADMIN — METHOCARBAMOL TABLETS 500 MG: 500 TABLET, COATED ORAL at 10:10

## 2021-10-06 RX ADMIN — INSULIN ASPART 2 UNITS: 100 INJECTION, SOLUTION INTRAVENOUS; SUBCUTANEOUS at 01:10

## 2021-10-06 RX ADMIN — INSULIN ASPART 12 UNITS: 100 INJECTION, SOLUTION INTRAVENOUS; SUBCUTANEOUS at 01:10

## 2021-10-06 RX ADMIN — Medication 10 ML: at 05:10

## 2021-10-06 RX ADMIN — SERTRALINE HYDROCHLORIDE 100 MG: 25 TABLET ORAL at 10:10

## 2021-10-06 RX ADMIN — ASPIRIN 81 MG: 81 TABLET, COATED ORAL at 10:10

## 2021-10-06 RX ADMIN — INSULIN ASPART 5 UNITS: 100 INJECTION, SOLUTION INTRAVENOUS; SUBCUTANEOUS at 10:10

## 2021-10-06 RX ADMIN — Medication 10 ML: at 01:10

## 2021-10-06 RX ADMIN — LOSARTAN POTASSIUM 50 MG: 50 TABLET, FILM COATED ORAL at 10:10

## 2021-10-06 RX ADMIN — PREGABALIN 150 MG: 75 CAPSULE ORAL at 05:10

## 2021-10-06 RX ADMIN — ATORVASTATIN CALCIUM 40 MG: 20 TABLET, FILM COATED ORAL at 10:10

## 2021-10-06 RX ADMIN — INSULIN ASPART 12 UNITS: 100 INJECTION, SOLUTION INTRAVENOUS; SUBCUTANEOUS at 10:10

## 2021-10-06 RX ADMIN — Medication 6 MG: at 10:10

## 2021-10-07 ENCOUNTER — TELEPHONE (OUTPATIENT)
Dept: DERMATOLOGY | Facility: CLINIC | Age: 76
End: 2021-10-07

## 2021-10-07 ENCOUNTER — PATIENT OUTREACH (OUTPATIENT)
Dept: ADMINISTRATIVE | Facility: OTHER | Age: 76
End: 2021-10-07

## 2021-10-07 PROBLEM — M31.0 LEUCOCYTOCLASTIC VASCULITIS: Status: ACTIVE | Noted: 2021-10-07

## 2021-10-07 LAB
ANION GAP SERPL CALC-SCNC: 11 MMOL/L (ref 8–16)
BASOPHILS # BLD AUTO: 0.03 K/UL (ref 0–0.2)
BASOPHILS NFR BLD: 0.3 % (ref 0–1.9)
BUN SERPL-MCNC: 18 MG/DL (ref 8–23)
CALCIUM SERPL-MCNC: 8.6 MG/DL (ref 8.7–10.5)
CHLORIDE SERPL-SCNC: 97 MMOL/L (ref 95–110)
CO2 SERPL-SCNC: 27 MMOL/L (ref 23–29)
CREAT SERPL-MCNC: 0.9 MG/DL (ref 0.5–1.4)
DIFFERENTIAL METHOD: ABNORMAL
EOSINOPHIL # BLD AUTO: 0.4 K/UL (ref 0–0.5)
EOSINOPHIL NFR BLD: 4.1 % (ref 0–8)
ERYTHROCYTE [DISTWIDTH] IN BLOOD BY AUTOMATED COUNT: 13.4 % (ref 11.5–14.5)
EST. GFR  (AFRICAN AMERICAN): >60 ML/MIN/1.73 M^2
EST. GFR  (NON AFRICAN AMERICAN): >60 ML/MIN/1.73 M^2
GLUCOSE SERPL-MCNC: 276 MG/DL (ref 70–110)
HCT VFR BLD AUTO: 26.9 % (ref 37–48.5)
HGB BLD-MCNC: 8.8 G/DL (ref 12–16)
IMM GRANULOCYTES # BLD AUTO: 0.11 K/UL (ref 0–0.04)
IMM GRANULOCYTES NFR BLD AUTO: 1 % (ref 0–0.5)
LYMPHOCYTES # BLD AUTO: 3.1 K/UL (ref 1–4.8)
LYMPHOCYTES NFR BLD: 29.3 % (ref 18–48)
MAGNESIUM SERPL-MCNC: 1.6 MG/DL (ref 1.6–2.6)
MCH RBC QN AUTO: 29 PG (ref 27–31)
MCHC RBC AUTO-ENTMCNC: 32.7 G/DL (ref 32–36)
MCV RBC AUTO: 89 FL (ref 82–98)
MONOCYTES # BLD AUTO: 1.1 K/UL (ref 0.3–1)
MONOCYTES NFR BLD: 10.5 % (ref 4–15)
NEUTROPHILS # BLD AUTO: 5.8 K/UL (ref 1.8–7.7)
NEUTROPHILS NFR BLD: 54.8 % (ref 38–73)
NRBC BLD-RTO: 0 /100 WBC
PLATELET # BLD AUTO: 363 K/UL (ref 150–450)
PMV BLD AUTO: 10.7 FL (ref 9.2–12.9)
POCT GLUCOSE: 223 MG/DL (ref 70–110)
POCT GLUCOSE: 240 MG/DL (ref 70–110)
POCT GLUCOSE: 342 MG/DL (ref 70–110)
POCT GLUCOSE: 97 MG/DL (ref 70–110)
POTASSIUM SERPL-SCNC: 3.6 MMOL/L (ref 3.5–5.1)
RBC # BLD AUTO: 3.03 M/UL (ref 4–5.4)
SODIUM SERPL-SCNC: 135 MMOL/L (ref 136–145)
WBC # BLD AUTO: 10.49 K/UL (ref 3.9–12.7)

## 2021-10-07 PROCEDURE — 63600175 PHARM REV CODE 636 W HCPCS: Performed by: INTERNAL MEDICINE

## 2021-10-07 PROCEDURE — 11000001 HC ACUTE MED/SURG PRIVATE ROOM

## 2021-10-07 PROCEDURE — 99900035 HC TECH TIME PER 15 MIN (STAT)

## 2021-10-07 PROCEDURE — 25000003 PHARM REV CODE 250: Performed by: INTERNAL MEDICINE

## 2021-10-07 PROCEDURE — 94761 N-INVAS EAR/PLS OXIMETRY MLT: CPT

## 2021-10-07 PROCEDURE — 63600175 PHARM REV CODE 636 W HCPCS: Performed by: PHYSICIAN ASSISTANT

## 2021-10-07 PROCEDURE — A4216 STERILE WATER/SALINE, 10 ML: HCPCS | Performed by: PHYSICIAN ASSISTANT

## 2021-10-07 PROCEDURE — 99232 SBSQ HOSP IP/OBS MODERATE 35: CPT | Mod: ,,, | Performed by: INTERNAL MEDICINE

## 2021-10-07 PROCEDURE — 94799 UNLISTED PULMONARY SVC/PX: CPT

## 2021-10-07 PROCEDURE — 80048 BASIC METABOLIC PNL TOTAL CA: CPT | Performed by: PHYSICIAN ASSISTANT

## 2021-10-07 PROCEDURE — 25000003 PHARM REV CODE 250: Performed by: PHYSICIAN ASSISTANT

## 2021-10-07 PROCEDURE — 83735 ASSAY OF MAGNESIUM: CPT | Performed by: PHYSICIAN ASSISTANT

## 2021-10-07 PROCEDURE — 85025 COMPLETE CBC W/AUTO DIFF WBC: CPT | Performed by: PHYSICIAN ASSISTANT

## 2021-10-07 PROCEDURE — 99232 PR SUBSEQUENT HOSPITAL CARE,LEVL II: ICD-10-PCS | Mod: ,,, | Performed by: INTERNAL MEDICINE

## 2021-10-07 RX ORDER — ONDANSETRON 2 MG/ML
4 INJECTION INTRAMUSCULAR; INTRAVENOUS EVERY 8 HOURS PRN
Status: DISCONTINUED | OUTPATIENT
Start: 2021-10-07 | End: 2021-10-08 | Stop reason: HOSPADM

## 2021-10-07 RX ORDER — LOPERAMIDE HYDROCHLORIDE 2 MG/1
4 CAPSULE ORAL ONCE
Status: COMPLETED | OUTPATIENT
Start: 2021-10-07 | End: 2021-10-07

## 2021-10-07 RX ORDER — LOPERAMIDE HYDROCHLORIDE 2 MG/1
2 CAPSULE ORAL 4 TIMES DAILY PRN
Status: DISCONTINUED | OUTPATIENT
Start: 2021-10-07 | End: 2021-10-08 | Stop reason: HOSPADM

## 2021-10-07 RX ADMIN — METHOCARBAMOL TABLETS 500 MG: 500 TABLET, COATED ORAL at 09:10

## 2021-10-07 RX ADMIN — Medication 10 ML: at 10:10

## 2021-10-07 RX ADMIN — Medication 10 ML: at 02:10

## 2021-10-07 RX ADMIN — HEPARIN SODIUM 5000 UNITS: 5000 INJECTION INTRAVENOUS; SUBCUTANEOUS at 02:10

## 2021-10-07 RX ADMIN — PANTOPRAZOLE SODIUM 40 MG: 40 TABLET, DELAYED RELEASE ORAL at 09:10

## 2021-10-07 RX ADMIN — PREGABALIN 150 MG: 75 CAPSULE ORAL at 02:10

## 2021-10-07 RX ADMIN — LOPERAMIDE HYDROCHLORIDE 4 MG: 2 CAPSULE ORAL at 02:10

## 2021-10-07 RX ADMIN — TRIAMCINOLONE ACETONIDE: 1 CREAM TOPICAL at 09:10

## 2021-10-07 RX ADMIN — INSULIN ASPART 12 UNITS: 100 INJECTION, SOLUTION INTRAVENOUS; SUBCUTANEOUS at 04:10

## 2021-10-07 RX ADMIN — ONDANSETRON 4 MG: 2 INJECTION INTRAMUSCULAR; INTRAVENOUS at 02:10

## 2021-10-07 RX ADMIN — HEPARIN SODIUM 5000 UNITS: 5000 INJECTION INTRAVENOUS; SUBCUTANEOUS at 10:10

## 2021-10-07 RX ADMIN — HEPARIN SODIUM 5000 UNITS: 5000 INJECTION INTRAVENOUS; SUBCUTANEOUS at 06:10

## 2021-10-07 RX ADMIN — Medication 6 MG: at 10:10

## 2021-10-07 RX ADMIN — FUROSEMIDE 40 MG: 10 INJECTION, SOLUTION INTRAMUSCULAR; INTRAVENOUS at 08:10

## 2021-10-07 RX ADMIN — LOSARTAN POTASSIUM 50 MG: 50 TABLET, FILM COATED ORAL at 09:10

## 2021-10-07 RX ADMIN — NIFEDIPINE 30 MG: 30 TABLET, FILM COATED, EXTENDED RELEASE ORAL at 09:10

## 2021-10-07 RX ADMIN — ASPIRIN 81 MG: 81 TABLET, COATED ORAL at 09:10

## 2021-10-07 RX ADMIN — INSULIN ASPART 12 UNITS: 100 INJECTION, SOLUTION INTRAVENOUS; SUBCUTANEOUS at 11:10

## 2021-10-07 RX ADMIN — HYDROCODONE BITARTRATE AND ACETAMINOPHEN 1 TABLET: 10; 325 TABLET ORAL at 08:10

## 2021-10-07 RX ADMIN — CARVEDILOL 3.12 MG: 3.12 TABLET, FILM COATED ORAL at 09:10

## 2021-10-07 RX ADMIN — METHOCARBAMOL TABLETS 500 MG: 500 TABLET, COATED ORAL at 04:10

## 2021-10-07 RX ADMIN — PREGABALIN 150 MG: 75 CAPSULE ORAL at 08:10

## 2021-10-07 RX ADMIN — Medication 10 ML: at 05:10

## 2021-10-07 RX ADMIN — PREGABALIN 150 MG: 75 CAPSULE ORAL at 09:10

## 2021-10-07 RX ADMIN — TRIAMCINOLONE ACETONIDE: 1 CREAM TOPICAL at 10:10

## 2021-10-07 RX ADMIN — HYDROCODONE BITARTRATE AND ACETAMINOPHEN 1 TABLET: 10; 325 TABLET ORAL at 11:10

## 2021-10-07 RX ADMIN — SERTRALINE HYDROCHLORIDE 100 MG: 25 TABLET ORAL at 09:10

## 2021-10-07 RX ADMIN — INSULIN ASPART 4 UNITS: 100 INJECTION, SOLUTION INTRAVENOUS; SUBCUTANEOUS at 05:10

## 2021-10-07 RX ADMIN — HYDROCODONE BITARTRATE AND ACETAMINOPHEN 1 TABLET: 10; 325 TABLET ORAL at 03:10

## 2021-10-07 RX ADMIN — FUROSEMIDE 40 MG: 10 INJECTION, SOLUTION INTRAMUSCULAR; INTRAVENOUS at 05:10

## 2021-10-07 RX ADMIN — CARVEDILOL 3.12 MG: 3.12 TABLET, FILM COATED ORAL at 08:10

## 2021-10-07 RX ADMIN — METHOCARBAMOL TABLETS 500 MG: 500 TABLET, COATED ORAL at 08:10

## 2021-10-07 RX ADMIN — COLCHICINE 0.6 MG: 0.6 TABLET, FILM COATED ORAL at 09:10

## 2021-10-08 VITALS
DIASTOLIC BLOOD PRESSURE: 68 MMHG | SYSTOLIC BLOOD PRESSURE: 139 MMHG | HEART RATE: 94 BPM | RESPIRATION RATE: 16 BRPM | WEIGHT: 183.88 LBS | TEMPERATURE: 98 F | OXYGEN SATURATION: 92 % | BODY MASS INDEX: 36.1 KG/M2 | HEIGHT: 60 IN

## 2021-10-08 DIAGNOSIS — D53.9 NUTRITIONAL ANEMIA: ICD-10-CM

## 2021-10-08 DIAGNOSIS — D64.9 NORMOCYTIC ANEMIA: Primary | ICD-10-CM

## 2021-10-08 PROBLEM — G93.41 ENCEPHALOPATHY, METABOLIC: Status: RESOLVED | Noted: 2021-10-01 | Resolved: 2021-10-08

## 2021-10-08 PROBLEM — J18.9 PNEUMONIA: Status: RESOLVED | Noted: 2021-10-01 | Resolved: 2021-10-08

## 2021-10-08 LAB
ANION GAP SERPL CALC-SCNC: 13 MMOL/L (ref 8–16)
BASOPHILS # BLD AUTO: 0.04 K/UL (ref 0–0.2)
BASOPHILS NFR BLD: 0.4 % (ref 0–1.9)
BUN SERPL-MCNC: 19 MG/DL (ref 8–23)
CALCIUM SERPL-MCNC: 8.4 MG/DL (ref 8.7–10.5)
CHLORIDE SERPL-SCNC: 96 MMOL/L (ref 95–110)
CO2 SERPL-SCNC: 21 MMOL/L (ref 23–29)
CREAT SERPL-MCNC: 1.1 MG/DL (ref 0.5–1.4)
DIFFERENTIAL METHOD: ABNORMAL
EOSINOPHIL # BLD AUTO: 0.2 K/UL (ref 0–0.5)
EOSINOPHIL NFR BLD: 2.2 % (ref 0–8)
ERYTHROCYTE [DISTWIDTH] IN BLOOD BY AUTOMATED COUNT: 13.8 % (ref 11.5–14.5)
EST. GFR  (AFRICAN AMERICAN): 57 ML/MIN/1.73 M^2
EST. GFR  (NON AFRICAN AMERICAN): 49 ML/MIN/1.73 M^2
GLUCOSE SERPL-MCNC: 459 MG/DL (ref 70–110)
HCT VFR BLD AUTO: 28.3 % (ref 37–48.5)
HGB BLD-MCNC: 9.4 G/DL (ref 12–16)
IMM GRANULOCYTES # BLD AUTO: 0.13 K/UL (ref 0–0.04)
IMM GRANULOCYTES NFR BLD AUTO: 1.2 % (ref 0–0.5)
LYMPHOCYTES # BLD AUTO: 2.9 K/UL (ref 1–4.8)
LYMPHOCYTES NFR BLD: 26.7 % (ref 18–48)
MAGNESIUM SERPL-MCNC: 1.6 MG/DL (ref 1.6–2.6)
MCH RBC QN AUTO: 29.7 PG (ref 27–31)
MCHC RBC AUTO-ENTMCNC: 33.2 G/DL (ref 32–36)
MCV RBC AUTO: 89 FL (ref 82–98)
MONOCYTES # BLD AUTO: 1 K/UL (ref 0.3–1)
MONOCYTES NFR BLD: 9.3 % (ref 4–15)
NEUTROPHILS # BLD AUTO: 6.6 K/UL (ref 1.8–7.7)
NEUTROPHILS NFR BLD: 60.2 % (ref 38–73)
NRBC BLD-RTO: 0 /100 WBC
PLATELET # BLD AUTO: 352 K/UL (ref 150–450)
PMV BLD AUTO: 10.8 FL (ref 9.2–12.9)
POCT GLUCOSE: 273 MG/DL (ref 70–110)
POCT GLUCOSE: 439 MG/DL (ref 70–110)
POCT GLUCOSE: 456 MG/DL (ref 70–110)
POTASSIUM SERPL-SCNC: 4.7 MMOL/L (ref 3.5–5.1)
RBC # BLD AUTO: 3.17 M/UL (ref 4–5.4)
SODIUM SERPL-SCNC: 130 MMOL/L (ref 136–145)
WBC # BLD AUTO: 10.88 K/UL (ref 3.9–12.7)

## 2021-10-08 PROCEDURE — 99239 PR HOSPITAL DISCHARGE DAY,>30 MIN: ICD-10-PCS | Mod: ,,, | Performed by: INTERNAL MEDICINE

## 2021-10-08 PROCEDURE — 99239 HOSP IP/OBS DSCHRG MGMT >30: CPT | Mod: ,,, | Performed by: INTERNAL MEDICINE

## 2021-10-08 PROCEDURE — 83735 ASSAY OF MAGNESIUM: CPT | Performed by: PHYSICIAN ASSISTANT

## 2021-10-08 PROCEDURE — A4216 STERILE WATER/SALINE, 10 ML: HCPCS | Performed by: PHYSICIAN ASSISTANT

## 2021-10-08 PROCEDURE — 25000003 PHARM REV CODE 250: Performed by: INTERNAL MEDICINE

## 2021-10-08 PROCEDURE — 25000003 PHARM REV CODE 250: Performed by: PHYSICIAN ASSISTANT

## 2021-10-08 PROCEDURE — 36415 COLL VENOUS BLD VENIPUNCTURE: CPT | Performed by: PHYSICIAN ASSISTANT

## 2021-10-08 PROCEDURE — 85025 COMPLETE CBC W/AUTO DIFF WBC: CPT | Performed by: PHYSICIAN ASSISTANT

## 2021-10-08 PROCEDURE — 63600175 PHARM REV CODE 636 W HCPCS: Performed by: INTERNAL MEDICINE

## 2021-10-08 PROCEDURE — 63600175 PHARM REV CODE 636 W HCPCS: Performed by: PHYSICIAN ASSISTANT

## 2021-10-08 PROCEDURE — 80048 BASIC METABOLIC PNL TOTAL CA: CPT | Performed by: PHYSICIAN ASSISTANT

## 2021-10-08 RX ORDER — FUROSEMIDE 40 MG/1
40 TABLET ORAL DAILY
Qty: 30 TABLET | Refills: 11 | Status: SHIPPED | OUTPATIENT
Start: 2021-10-08 | End: 2023-01-05 | Stop reason: SDUPTHER

## 2021-10-08 RX ADMIN — INSULIN ASPART 6 UNITS: 100 INJECTION, SOLUTION INTRAVENOUS; SUBCUTANEOUS at 11:10

## 2021-10-08 RX ADMIN — PREGABALIN 150 MG: 75 CAPSULE ORAL at 08:10

## 2021-10-08 RX ADMIN — SERTRALINE HYDROCHLORIDE 100 MG: 25 TABLET ORAL at 08:10

## 2021-10-08 RX ADMIN — CARVEDILOL 3.12 MG: 3.12 TABLET, FILM COATED ORAL at 08:10

## 2021-10-08 RX ADMIN — Medication 10 ML: at 06:10

## 2021-10-08 RX ADMIN — ASPIRIN 81 MG: 81 TABLET, COATED ORAL at 08:10

## 2021-10-08 RX ADMIN — INSULIN ASPART 10 UNITS: 100 INJECTION, SOLUTION INTRAVENOUS; SUBCUTANEOUS at 08:10

## 2021-10-08 RX ADMIN — LOPERAMIDE HYDROCHLORIDE 2 MG: 2 CAPSULE ORAL at 02:10

## 2021-10-08 RX ADMIN — INSULIN ASPART 12 UNITS: 100 INJECTION, SOLUTION INTRAVENOUS; SUBCUTANEOUS at 08:10

## 2021-10-08 RX ADMIN — HYDROCODONE BITARTRATE AND ACETAMINOPHEN 1 TABLET: 10; 325 TABLET ORAL at 02:10

## 2021-10-08 RX ADMIN — PANTOPRAZOLE SODIUM 40 MG: 40 TABLET, DELAYED RELEASE ORAL at 08:10

## 2021-10-08 RX ADMIN — FUROSEMIDE 40 MG: 10 INJECTION, SOLUTION INTRAMUSCULAR; INTRAVENOUS at 08:10

## 2021-10-08 RX ADMIN — LOSARTAN POTASSIUM 50 MG: 50 TABLET, FILM COATED ORAL at 08:10

## 2021-10-08 RX ADMIN — METHOCARBAMOL TABLETS 500 MG: 500 TABLET, COATED ORAL at 08:10

## 2021-10-08 RX ADMIN — TRIAMCINOLONE ACETONIDE: 1 CREAM TOPICAL at 08:10

## 2021-10-08 RX ADMIN — METHOCARBAMOL TABLETS 500 MG: 500 TABLET, COATED ORAL at 02:10

## 2021-10-08 RX ADMIN — HEPARIN SODIUM 5000 UNITS: 5000 INJECTION INTRAVENOUS; SUBCUTANEOUS at 02:10

## 2021-10-08 RX ADMIN — HEPARIN SODIUM 5000 UNITS: 5000 INJECTION INTRAVENOUS; SUBCUTANEOUS at 06:10

## 2021-10-08 RX ADMIN — INSULIN ASPART 12 UNITS: 100 INJECTION, SOLUTION INTRAVENOUS; SUBCUTANEOUS at 11:10

## 2021-10-08 RX ADMIN — HYDROCODONE BITARTRATE AND ACETAMINOPHEN 1 TABLET: 10; 325 TABLET ORAL at 06:10

## 2021-10-08 RX ADMIN — PREGABALIN 150 MG: 75 CAPSULE ORAL at 02:10

## 2021-10-11 ENCOUNTER — DOCUMENT SCAN (OUTPATIENT)
Dept: HOME HEALTH SERVICES | Facility: HOSPITAL | Age: 76
End: 2021-10-11
Payer: MEDICARE

## 2021-10-11 ENCOUNTER — PATIENT OUTREACH (OUTPATIENT)
Dept: ADMINISTRATIVE | Facility: OTHER | Age: 76
End: 2021-10-11

## 2021-10-12 ENCOUNTER — PATIENT OUTREACH (OUTPATIENT)
Dept: ADMINISTRATIVE | Facility: CLINIC | Age: 76
End: 2021-10-12

## 2021-10-13 ENCOUNTER — TELEPHONE (OUTPATIENT)
Dept: PAIN MEDICINE | Facility: CLINIC | Age: 76
End: 2021-10-13

## 2021-10-14 ENCOUNTER — OFFICE VISIT (OUTPATIENT)
Dept: INTERNAL MEDICINE | Facility: CLINIC | Age: 76
End: 2021-10-14
Payer: MEDICARE

## 2021-10-14 ENCOUNTER — OFFICE VISIT (OUTPATIENT)
Dept: PAIN MEDICINE | Facility: CLINIC | Age: 76
End: 2021-10-14
Payer: MEDICARE

## 2021-10-14 ENCOUNTER — TELEPHONE (OUTPATIENT)
Dept: INTERNAL MEDICINE | Facility: CLINIC | Age: 76
End: 2021-10-14

## 2021-10-14 VITALS
RESPIRATION RATE: 18 BRPM | HEIGHT: 60 IN | HEART RATE: 107 BPM | DIASTOLIC BLOOD PRESSURE: 67 MMHG | TEMPERATURE: 99 F | WEIGHT: 183 LBS | SYSTOLIC BLOOD PRESSURE: 122 MMHG | BODY MASS INDEX: 35.93 KG/M2

## 2021-10-14 VITALS
BODY MASS INDEX: 35.76 KG/M2 | HEIGHT: 60 IN | DIASTOLIC BLOOD PRESSURE: 60 MMHG | WEIGHT: 182.13 LBS | SYSTOLIC BLOOD PRESSURE: 114 MMHG | OXYGEN SATURATION: 94 % | HEART RATE: 105 BPM

## 2021-10-14 DIAGNOSIS — M51.36 DEGENERATION OF INTERVERTEBRAL DISC OF LUMBAR REGION: ICD-10-CM

## 2021-10-14 DIAGNOSIS — I25.84 CORONARY ARTERY CALCIFICATION: Chronic | ICD-10-CM

## 2021-10-14 DIAGNOSIS — R19.7 DIARRHEA, UNSPECIFIED TYPE: ICD-10-CM

## 2021-10-14 DIAGNOSIS — M79.2 NEURALGIA AND NEURITIS: ICD-10-CM

## 2021-10-14 DIAGNOSIS — G89.4 CHRONIC PAIN SYNDROME: ICD-10-CM

## 2021-10-14 DIAGNOSIS — E08.42 DIABETIC POLYNEUROPATHY ASSOCIATED WITH DIABETES MELLITUS DUE TO UNDERLYING CONDITION: ICD-10-CM

## 2021-10-14 DIAGNOSIS — E78.2 MIXED HYPERLIPIDEMIA: Primary | ICD-10-CM

## 2021-10-14 DIAGNOSIS — I10 ESSENTIAL HYPERTENSION: Chronic | ICD-10-CM

## 2021-10-14 DIAGNOSIS — E11.65 TYPE 2 DIABETES MELLITUS WITH HYPERGLYCEMIA, WITH LONG-TERM CURRENT USE OF INSULIN: Chronic | ICD-10-CM

## 2021-10-14 DIAGNOSIS — R10.31 RIGHT LOWER QUADRANT ABDOMINAL PAIN: ICD-10-CM

## 2021-10-14 DIAGNOSIS — R30.0 DYSURIA: Primary | ICD-10-CM

## 2021-10-14 DIAGNOSIS — K21.9 GASTROESOPHAGEAL REFLUX DISEASE WITHOUT ESOPHAGITIS: Chronic | ICD-10-CM

## 2021-10-14 DIAGNOSIS — R30.0 DYSURIA: ICD-10-CM

## 2021-10-14 DIAGNOSIS — Z79.4 TYPE 2 DIABETES MELLITUS WITH HYPERGLYCEMIA, WITH LONG-TERM CURRENT USE OF INSULIN: Chronic | ICD-10-CM

## 2021-10-14 DIAGNOSIS — E78.01 FAMILIAL HYPERCHOLESTEROLEMIA: Chronic | ICD-10-CM

## 2021-10-14 DIAGNOSIS — I50.33 ACUTE ON CHRONIC DIASTOLIC HEART FAILURE: ICD-10-CM

## 2021-10-14 DIAGNOSIS — I25.10 CORONARY ARTERY CALCIFICATION: Chronic | ICD-10-CM

## 2021-10-14 DIAGNOSIS — M31.0 LEUCOCYTOCLASTIC VASCULITIS: Primary | ICD-10-CM

## 2021-10-14 DIAGNOSIS — M06.9 RHEUMATOID ARTHRITIS, INVOLVING UNSPECIFIED SITE, UNSPECIFIED WHETHER RHEUMATOID FACTOR PRESENT: ICD-10-CM

## 2021-10-14 DIAGNOSIS — M31.0 LEUCOCYTOCLASTIC VASCULITIS: ICD-10-CM

## 2021-10-14 DIAGNOSIS — E66.9 MILD OBESITY: ICD-10-CM

## 2021-10-14 DIAGNOSIS — J41.1 MUCOPURULENT CHRONIC BRONCHITIS: ICD-10-CM

## 2021-10-14 DIAGNOSIS — M79.7 FIBROMYALGIA: Chronic | ICD-10-CM

## 2021-10-14 PROCEDURE — 99999 PR PBB SHADOW E&M-EST. PATIENT-LVL IV: ICD-10-PCS | Mod: PBBFAC,,, | Performed by: NURSE PRACTITIONER

## 2021-10-14 PROCEDURE — 99213 PR OFFICE/OUTPT VISIT, EST, LEVL III, 20-29 MIN: ICD-10-PCS | Mod: S$PBB,,, | Performed by: NURSE PRACTITIONER

## 2021-10-14 PROCEDURE — 99999 PR PBB SHADOW E&M-EST. PATIENT-LVL IV: CPT | Mod: PBBFAC,,, | Performed by: NURSE PRACTITIONER

## 2021-10-14 PROCEDURE — 99999 PR PBB SHADOW E&M-EST. PATIENT-LVL V: CPT | Mod: PBBFAC,,, | Performed by: STUDENT IN AN ORGANIZED HEALTH CARE EDUCATION/TRAINING PROGRAM

## 2021-10-14 PROCEDURE — 99999 PR PBB SHADOW E&M-EST. PATIENT-LVL V: ICD-10-PCS | Mod: PBBFAC,,, | Performed by: STUDENT IN AN ORGANIZED HEALTH CARE EDUCATION/TRAINING PROGRAM

## 2021-10-14 PROCEDURE — 99214 OFFICE O/P EST MOD 30 MIN: CPT | Mod: S$PBB,,, | Performed by: STUDENT IN AN ORGANIZED HEALTH CARE EDUCATION/TRAINING PROGRAM

## 2021-10-14 PROCEDURE — 99214 PR OFFICE/OUTPT VISIT, EST, LEVL IV, 30-39 MIN: ICD-10-PCS | Mod: S$PBB,,, | Performed by: STUDENT IN AN ORGANIZED HEALTH CARE EDUCATION/TRAINING PROGRAM

## 2021-10-14 PROCEDURE — 99214 OFFICE O/P EST MOD 30 MIN: CPT | Mod: PBBFAC | Performed by: NURSE PRACTITIONER

## 2021-10-14 PROCEDURE — 99213 OFFICE O/P EST LOW 20 MIN: CPT | Mod: S$PBB,,, | Performed by: NURSE PRACTITIONER

## 2021-10-14 PROCEDURE — 99215 OFFICE O/P EST HI 40 MIN: CPT | Mod: PBBFAC,27 | Performed by: STUDENT IN AN ORGANIZED HEALTH CARE EDUCATION/TRAINING PROGRAM

## 2021-10-14 RX ORDER — HYDROCODONE BITARTRATE AND ACETAMINOPHEN 10; 325 MG/1; MG/1
1 TABLET ORAL EVERY 8 HOURS PRN
Qty: 90 TABLET | Refills: 0 | Status: SHIPPED | OUTPATIENT
Start: 2021-10-14 | End: 2021-11-11 | Stop reason: SDUPTHER

## 2021-10-14 RX ORDER — PREGABALIN 150 MG/1
150 CAPSULE ORAL 3 TIMES DAILY
Qty: 90 CAPSULE | Refills: 1 | Status: SHIPPED | OUTPATIENT
Start: 2021-10-14 | End: 2021-11-11 | Stop reason: SDUPTHER

## 2021-10-14 RX ORDER — METHOCARBAMOL 500 MG/1
500 TABLET, FILM COATED ORAL 3 TIMES DAILY
Qty: 90 TABLET | Refills: 1 | Status: SHIPPED | OUTPATIENT
Start: 2021-10-14 | End: 2021-11-11 | Stop reason: SDUPTHER

## 2021-10-15 ENCOUNTER — SPECIALTY PHARMACY (OUTPATIENT)
Dept: PHARMACY | Facility: CLINIC | Age: 76
End: 2021-10-15

## 2021-10-18 ENCOUNTER — TELEPHONE (OUTPATIENT)
Dept: INTERNAL MEDICINE | Facility: CLINIC | Age: 76
End: 2021-10-18

## 2021-10-18 DIAGNOSIS — I10 ESSENTIAL HYPERTENSION: Primary | ICD-10-CM

## 2021-10-18 RX ORDER — PEN NEEDLE, DIABETIC 30 GX3/16"
1 NEEDLE, DISPOSABLE MISCELLANEOUS 4 TIMES DAILY
Qty: 400 EACH | Refills: 2 | Status: SHIPPED | OUTPATIENT
Start: 2021-10-18 | End: 2022-05-29 | Stop reason: SDUPTHER

## 2021-10-18 RX ORDER — CLONIDINE HYDROCHLORIDE 0.1 MG/1
0.1 TABLET ORAL 2 TIMES DAILY
Qty: 180 TABLET | Refills: 1 | Status: SHIPPED | OUTPATIENT
Start: 2021-10-18 | End: 2022-02-20 | Stop reason: SDUPTHER

## 2021-10-21 ENCOUNTER — SPECIALTY PHARMACY (OUTPATIENT)
Dept: PHARMACY | Facility: CLINIC | Age: 76
End: 2021-10-21
Payer: MEDICARE

## 2021-10-21 ENCOUNTER — LAB VISIT (OUTPATIENT)
Dept: LAB | Facility: OTHER | Age: 76
End: 2021-10-21
Attending: INTERNAL MEDICINE
Payer: MEDICARE

## 2021-10-21 ENCOUNTER — PATIENT MESSAGE (OUTPATIENT)
Dept: PHARMACY | Facility: CLINIC | Age: 76
End: 2021-10-21
Payer: MEDICARE

## 2021-10-21 DIAGNOSIS — I25.84 CORONARY ARTERY CALCIFICATION: Primary | ICD-10-CM

## 2021-10-21 DIAGNOSIS — I25.10 CORONARY ARTERY CALCIFICATION: Primary | ICD-10-CM

## 2021-10-21 DIAGNOSIS — E11.59 TYPE 2 DIABETES MELLITUS WITH OTHER CIRCULATORY COMPLICATION, WITHOUT LONG-TERM CURRENT USE OF INSULIN: ICD-10-CM

## 2021-10-21 LAB
ALBUMIN SERPL BCP-MCNC: 2.9 G/DL (ref 3.5–5.2)
ALP SERPL-CCNC: 100 U/L (ref 55–135)
ALT SERPL W/O P-5'-P-CCNC: 7 U/L (ref 10–44)
ANION GAP SERPL CALC-SCNC: 13 MMOL/L (ref 8–16)
AST SERPL-CCNC: 13 U/L (ref 10–40)
BILIRUB SERPL-MCNC: 0.6 MG/DL (ref 0.1–1)
BUN SERPL-MCNC: 22 MG/DL (ref 8–23)
CALCIUM SERPL-MCNC: 8.9 MG/DL (ref 8.7–10.5)
CHLORIDE SERPL-SCNC: 103 MMOL/L (ref 95–110)
CHOLEST SERPL-MCNC: 186 MG/DL (ref 120–199)
CHOLEST/HDLC SERPL: 4.9 {RATIO} (ref 2–5)
CO2 SERPL-SCNC: 24 MMOL/L (ref 23–29)
CREAT SERPL-MCNC: 1.2 MG/DL (ref 0.5–1.4)
EST. GFR  (AFRICAN AMERICAN): 51 ML/MIN/1.73 M^2
EST. GFR  (NON AFRICAN AMERICAN): 44 ML/MIN/1.73 M^2
ESTIMATED AVG GLUCOSE: 235 MG/DL (ref 68–131)
GLUCOSE SERPL-MCNC: 245 MG/DL (ref 70–110)
HBA1C MFR BLD: 9.8 % (ref 4–5.6)
HDLC SERPL-MCNC: 38 MG/DL (ref 40–75)
HDLC SERPL: 20.4 % (ref 20–50)
LDLC SERPL CALC-MCNC: 109 MG/DL (ref 63–159)
NONHDLC SERPL-MCNC: 148 MG/DL
POTASSIUM SERPL-SCNC: 5 MMOL/L (ref 3.5–5.1)
PROT SERPL-MCNC: 6.9 G/DL (ref 6–8.4)
SODIUM SERPL-SCNC: 140 MMOL/L (ref 136–145)
TRIGL SERPL-MCNC: 195 MG/DL (ref 30–150)

## 2021-10-21 PROCEDURE — 80053 COMPREHEN METABOLIC PANEL: CPT | Performed by: INTERNAL MEDICINE

## 2021-10-21 PROCEDURE — 80061 LIPID PANEL: CPT | Performed by: INTERNAL MEDICINE

## 2021-10-21 PROCEDURE — 36415 COLL VENOUS BLD VENIPUNCTURE: CPT | Performed by: INTERNAL MEDICINE

## 2021-10-21 PROCEDURE — 83036 HEMOGLOBIN GLYCOSYLATED A1C: CPT | Performed by: INTERNAL MEDICINE

## 2021-10-22 ENCOUNTER — DOCUMENT SCAN (OUTPATIENT)
Dept: HOME HEALTH SERVICES | Facility: HOSPITAL | Age: 76
End: 2021-10-22
Payer: MEDICARE

## 2021-10-22 ENCOUNTER — DOCUMENT SCAN (OUTPATIENT)
Dept: HOME HEALTH SERVICES | Facility: HOSPITAL | Age: 76
End: 2021-10-22

## 2021-10-25 ENCOUNTER — DOCUMENT SCAN (OUTPATIENT)
Dept: HOME HEALTH SERVICES | Facility: HOSPITAL | Age: 76
End: 2021-10-25
Payer: MEDICARE

## 2021-10-28 ENCOUNTER — OFFICE VISIT (OUTPATIENT)
Dept: ENDOCRINOLOGY | Facility: CLINIC | Age: 76
End: 2021-10-28
Payer: MEDICARE

## 2021-10-28 VITALS
SYSTOLIC BLOOD PRESSURE: 137 MMHG | DIASTOLIC BLOOD PRESSURE: 62 MMHG | WEIGHT: 182.63 LBS | HEIGHT: 60 IN | HEART RATE: 64 BPM | OXYGEN SATURATION: 100 % | BODY MASS INDEX: 35.86 KG/M2

## 2021-10-28 DIAGNOSIS — E11.65 TYPE 2 DIABETES MELLITUS WITH HYPERGLYCEMIA, WITH LONG-TERM CURRENT USE OF INSULIN: Primary | Chronic | ICD-10-CM

## 2021-10-28 DIAGNOSIS — E11.59 TYPE 2 DIABETES MELLITUS WITH OTHER CIRCULATORY COMPLICATION, WITHOUT LONG-TERM CURRENT USE OF INSULIN: ICD-10-CM

## 2021-10-28 DIAGNOSIS — Z79.4 TYPE 2 DIABETES MELLITUS WITH HYPERGLYCEMIA, WITH LONG-TERM CURRENT USE OF INSULIN: Primary | Chronic | ICD-10-CM

## 2021-10-28 PROCEDURE — 99214 OFFICE O/P EST MOD 30 MIN: CPT | Mod: PBBFAC | Performed by: NURSE PRACTITIONER

## 2021-10-28 PROCEDURE — 99214 PR OFFICE/OUTPT VISIT, EST, LEVL IV, 30-39 MIN: ICD-10-PCS | Mod: S$PBB,,, | Performed by: NURSE PRACTITIONER

## 2021-10-28 PROCEDURE — 99999 PR PBB SHADOW E&M-EST. PATIENT-LVL IV: CPT | Mod: PBBFAC,,, | Performed by: NURSE PRACTITIONER

## 2021-10-28 PROCEDURE — 99999 PR PBB SHADOW E&M-EST. PATIENT-LVL IV: ICD-10-PCS | Mod: PBBFAC,,, | Performed by: NURSE PRACTITIONER

## 2021-10-28 PROCEDURE — 99214 OFFICE O/P EST MOD 30 MIN: CPT | Mod: S$PBB,,, | Performed by: NURSE PRACTITIONER

## 2021-10-28 RX ORDER — INSULIN DETEMIR 100 [IU]/ML
10 INJECTION, SOLUTION SUBCUTANEOUS 2 TIMES DAILY
Qty: 15 ML | Refills: 3 | Status: SHIPPED | OUTPATIENT
Start: 2021-10-28 | End: 2021-11-09

## 2021-10-28 RX ORDER — INSULIN ASPART 100 [IU]/ML
8 INJECTION, SOLUTION INTRAVENOUS; SUBCUTANEOUS
Qty: 15 ML | Refills: 3 | Status: SHIPPED | OUTPATIENT
Start: 2021-10-28 | End: 2021-11-09

## 2021-10-29 RX ORDER — BLOOD-GLUCOSE,RECEIVER,CONT
1 EACH MISCELLANEOUS CONTINUOUS PRN
Qty: 1 EACH | Status: SHIPPED | OUTPATIENT
Start: 2021-10-29 | End: 2023-09-29

## 2021-10-29 RX ORDER — BLOOD-GLUCOSE SENSOR
3 EACH MISCELLANEOUS CONTINUOUS PRN
Qty: 3 EACH | Status: SHIPPED | OUTPATIENT
Start: 2021-10-29 | End: 2023-09-29

## 2021-10-29 RX ORDER — BLOOD-GLUCOSE TRANSMITTER
1 EACH MISCELLANEOUS CONTINUOUS PRN
Qty: 1 EACH | Status: SHIPPED | OUTPATIENT
Start: 2021-10-29 | End: 2023-09-29

## 2021-10-29 RX ORDER — LANCETS 33 GAUGE
200 EACH MISCELLANEOUS 4 TIMES DAILY
Qty: 200 EACH | Refills: 0 | Status: SHIPPED | OUTPATIENT
Start: 2021-10-29 | End: 2022-05-29 | Stop reason: SDUPTHER

## 2021-10-31 ENCOUNTER — PATIENT MESSAGE (OUTPATIENT)
Dept: ENDOCRINOLOGY | Facility: CLINIC | Age: 76
End: 2021-10-31
Payer: MEDICARE

## 2021-11-02 ENCOUNTER — TELEPHONE (OUTPATIENT)
Dept: HEMATOLOGY/ONCOLOGY | Facility: CLINIC | Age: 76
End: 2021-11-02
Payer: MEDICARE

## 2021-11-02 ENCOUNTER — PATIENT MESSAGE (OUTPATIENT)
Dept: ENDOCRINOLOGY | Facility: CLINIC | Age: 76
End: 2021-11-02
Payer: MEDICARE

## 2021-11-04 ENCOUNTER — TELEPHONE (OUTPATIENT)
Dept: HEMATOLOGY/ONCOLOGY | Facility: CLINIC | Age: 76
End: 2021-11-04
Payer: MEDICARE

## 2021-11-05 ENCOUNTER — PATIENT MESSAGE (OUTPATIENT)
Dept: ENDOCRINOLOGY | Facility: CLINIC | Age: 76
End: 2021-11-05
Payer: MEDICARE

## 2021-11-09 ENCOUNTER — TELEPHONE (OUTPATIENT)
Dept: INTERNAL MEDICINE | Facility: CLINIC | Age: 76
End: 2021-11-09
Payer: MEDICARE

## 2021-11-09 ENCOUNTER — OFFICE VISIT (OUTPATIENT)
Dept: INTERNAL MEDICINE | Facility: CLINIC | Age: 76
End: 2021-11-09
Payer: MEDICARE

## 2021-11-09 ENCOUNTER — PATIENT MESSAGE (OUTPATIENT)
Dept: INTERNAL MEDICINE | Facility: CLINIC | Age: 76
End: 2021-11-09
Payer: MEDICARE

## 2021-11-09 ENCOUNTER — TELEPHONE (OUTPATIENT)
Dept: HEMATOLOGY/ONCOLOGY | Facility: CLINIC | Age: 76
End: 2021-11-09
Payer: MEDICARE

## 2021-11-09 ENCOUNTER — TELEPHONE (OUTPATIENT)
Dept: ENDOCRINOLOGY | Facility: CLINIC | Age: 76
End: 2021-11-09
Payer: MEDICARE

## 2021-11-09 VITALS — DIASTOLIC BLOOD PRESSURE: 82 MMHG | OXYGEN SATURATION: 96 % | SYSTOLIC BLOOD PRESSURE: 144 MMHG

## 2021-11-09 DIAGNOSIS — E08.42 DIABETIC POLYNEUROPATHY ASSOCIATED WITH DIABETES MELLITUS DUE TO UNDERLYING CONDITION: Chronic | ICD-10-CM

## 2021-11-09 DIAGNOSIS — I25.10 CORONARY ARTERY CALCIFICATION: Chronic | ICD-10-CM

## 2021-11-09 DIAGNOSIS — E78.01 FAMILIAL HYPERCHOLESTEROLEMIA: Chronic | ICD-10-CM

## 2021-11-09 DIAGNOSIS — I25.84 CORONARY ARTERY CALCIFICATION: Chronic | ICD-10-CM

## 2021-11-09 DIAGNOSIS — E11.10 DIABETIC KETOACIDOSIS WITHOUT COMA ASSOCIATED WITH TYPE 2 DIABETES MELLITUS: ICD-10-CM

## 2021-11-09 DIAGNOSIS — I10 ESSENTIAL HYPERTENSION: Chronic | ICD-10-CM

## 2021-11-09 DIAGNOSIS — C53.9: ICD-10-CM

## 2021-11-09 DIAGNOSIS — I50.33 ACUTE ON CHRONIC DIASTOLIC HEART FAILURE: ICD-10-CM

## 2021-11-09 DIAGNOSIS — Z79.4 TYPE 2 DIABETES MELLITUS WITH HYPERGLYCEMIA, WITH LONG-TERM CURRENT USE OF INSULIN: Chronic | ICD-10-CM

## 2021-11-09 DIAGNOSIS — G89.4 CHRONIC PAIN SYNDROME: ICD-10-CM

## 2021-11-09 DIAGNOSIS — E11.59 TYPE 2 DIABETES MELLITUS WITH OTHER CIRCULATORY COMPLICATION, WITHOUT LONG-TERM CURRENT USE OF INSULIN: ICD-10-CM

## 2021-11-09 DIAGNOSIS — R10.9 ABDOMINAL CRAMPING: ICD-10-CM

## 2021-11-09 DIAGNOSIS — E11.65 TYPE 2 DIABETES MELLITUS WITH HYPERGLYCEMIA, WITH LONG-TERM CURRENT USE OF INSULIN: Chronic | ICD-10-CM

## 2021-11-09 DIAGNOSIS — Z71.89 ADVANCED CARE PLANNING/COUNSELING DISCUSSION: ICD-10-CM

## 2021-11-09 DIAGNOSIS — J41.1 MUCOPURULENT CHRONIC BRONCHITIS: ICD-10-CM

## 2021-11-09 DIAGNOSIS — R19.7 DIARRHEA, UNSPECIFIED TYPE: Primary | ICD-10-CM

## 2021-11-09 PROCEDURE — 99215 OFFICE O/P EST HI 40 MIN: CPT | Mod: 95,,, | Performed by: STUDENT IN AN ORGANIZED HEALTH CARE EDUCATION/TRAINING PROGRAM

## 2021-11-09 PROCEDURE — 99215 PR OFFICE/OUTPT VISIT, EST, LEVL V, 40-54 MIN: ICD-10-PCS | Mod: 95,,, | Performed by: STUDENT IN AN ORGANIZED HEALTH CARE EDUCATION/TRAINING PROGRAM

## 2021-11-09 RX ORDER — INSULIN DETEMIR 100 [IU]/ML
14 INJECTION, SOLUTION SUBCUTANEOUS 2 TIMES DAILY
Qty: 15 ML | Refills: 3 | Status: SHIPPED | OUTPATIENT
Start: 2021-11-09 | End: 2022-01-31 | Stop reason: SDUPTHER

## 2021-11-09 RX ORDER — INSULIN ASPART 100 [IU]/ML
10 INJECTION, SOLUTION INTRAVENOUS; SUBCUTANEOUS
Qty: 15 ML | Refills: 3 | Status: SHIPPED | OUTPATIENT
Start: 2021-11-09 | End: 2022-02-20 | Stop reason: SDUPTHER

## 2021-11-09 RX ORDER — DICYCLOMINE HYDROCHLORIDE 10 MG/1
10 CAPSULE ORAL
Qty: 120 CAPSULE | Refills: 1 | Status: SHIPPED | OUTPATIENT
Start: 2021-11-09 | End: 2022-02-04

## 2021-11-10 ENCOUNTER — TELEPHONE (OUTPATIENT)
Dept: INTERNAL MEDICINE | Facility: CLINIC | Age: 76
End: 2021-11-10
Payer: MEDICARE

## 2021-11-12 ENCOUNTER — LAB VISIT (OUTPATIENT)
Dept: LAB | Facility: OTHER | Age: 76
End: 2021-11-12
Attending: STUDENT IN AN ORGANIZED HEALTH CARE EDUCATION/TRAINING PROGRAM
Payer: MEDICARE

## 2021-11-12 DIAGNOSIS — R19.7 DIARRHEA, UNSPECIFIED TYPE: ICD-10-CM

## 2021-11-12 LAB
C DIFF GDH STL QL: NEGATIVE
C DIFF TOX A+B STL QL IA: NEGATIVE
OB PNL STL: POSITIVE

## 2021-11-12 PROCEDURE — 89055 LEUKOCYTE ASSESSMENT FECAL: CPT | Performed by: STUDENT IN AN ORGANIZED HEALTH CARE EDUCATION/TRAINING PROGRAM

## 2021-11-12 PROCEDURE — 87045 FECES CULTURE AEROBIC BACT: CPT | Performed by: STUDENT IN AN ORGANIZED HEALTH CARE EDUCATION/TRAINING PROGRAM

## 2021-11-12 PROCEDURE — 87324 CLOSTRIDIUM AG IA: CPT | Performed by: STUDENT IN AN ORGANIZED HEALTH CARE EDUCATION/TRAINING PROGRAM

## 2021-11-12 PROCEDURE — 87046 STOOL CULTR AEROBIC BACT EA: CPT | Performed by: STUDENT IN AN ORGANIZED HEALTH CARE EDUCATION/TRAINING PROGRAM

## 2021-11-12 PROCEDURE — 87427 SHIGA-LIKE TOXIN AG IA: CPT | Performed by: STUDENT IN AN ORGANIZED HEALTH CARE EDUCATION/TRAINING PROGRAM

## 2021-11-12 PROCEDURE — 87449 NOS EACH ORGANISM AG IA: CPT | Performed by: STUDENT IN AN ORGANIZED HEALTH CARE EDUCATION/TRAINING PROGRAM

## 2021-11-12 PROCEDURE — 82272 OCCULT BLD FECES 1-3 TESTS: CPT | Performed by: STUDENT IN AN ORGANIZED HEALTH CARE EDUCATION/TRAINING PROGRAM

## 2021-11-13 LAB — WBC #/AREA STL HPF: NORMAL /[HPF]

## 2021-11-14 PROCEDURE — G0179 PR HOME HEALTH MD RECERTIFICATION: ICD-10-PCS | Mod: ,,, | Performed by: STUDENT IN AN ORGANIZED HEALTH CARE EDUCATION/TRAINING PROGRAM

## 2021-11-14 PROCEDURE — G0179 MD RECERTIFICATION HHA PT: HCPCS | Mod: ,,, | Performed by: STUDENT IN AN ORGANIZED HEALTH CARE EDUCATION/TRAINING PROGRAM

## 2021-11-15 LAB
E COLI SXT1 STL QL IA: NEGATIVE
E COLI SXT2 STL QL IA: NEGATIVE
O+P STL MICRO: NORMAL

## 2021-11-16 LAB — BACTERIA STL CULT: NORMAL

## 2021-11-18 ENCOUNTER — TELEPHONE (OUTPATIENT)
Dept: PAIN MEDICINE | Facility: CLINIC | Age: 76
End: 2021-11-18
Payer: MEDICARE

## 2021-11-18 ENCOUNTER — PATIENT MESSAGE (OUTPATIENT)
Dept: ENDOCRINOLOGY | Facility: CLINIC | Age: 76
End: 2021-11-18
Payer: MEDICARE

## 2021-11-18 DIAGNOSIS — E78.5 HYPERLIPIDEMIA, UNSPECIFIED HYPERLIPIDEMIA TYPE: ICD-10-CM

## 2021-11-18 RX ORDER — ATORVASTATIN CALCIUM 40 MG/1
40 TABLET, FILM COATED ORAL DAILY
Qty: 30 TABLET | Refills: 0 | OUTPATIENT
Start: 2021-11-18

## 2021-11-20 ENCOUNTER — PATIENT MESSAGE (OUTPATIENT)
Dept: ENDOCRINOLOGY | Facility: CLINIC | Age: 76
End: 2021-11-20
Payer: MEDICARE

## 2021-11-20 DIAGNOSIS — E11.65 TYPE 2 DIABETES MELLITUS WITH HYPERGLYCEMIA, WITH LONG-TERM CURRENT USE OF INSULIN: Primary | ICD-10-CM

## 2021-11-20 DIAGNOSIS — Z79.4 TYPE 2 DIABETES MELLITUS WITH HYPERGLYCEMIA, WITH LONG-TERM CURRENT USE OF INSULIN: Primary | ICD-10-CM

## 2021-11-22 ENCOUNTER — TELEPHONE (OUTPATIENT)
Dept: ENDOCRINOLOGY | Facility: CLINIC | Age: 76
End: 2021-11-22
Payer: MEDICARE

## 2021-11-24 ENCOUNTER — TELEPHONE (OUTPATIENT)
Dept: PAIN MEDICINE | Facility: CLINIC | Age: 76
End: 2021-11-24
Payer: MEDICARE

## 2021-12-01 ENCOUNTER — PATIENT OUTREACH (OUTPATIENT)
Dept: ADMINISTRATIVE | Facility: OTHER | Age: 76
End: 2021-12-01
Payer: MEDICARE

## 2021-12-03 ENCOUNTER — EXTERNAL HOME HEALTH (OUTPATIENT)
Dept: HOME HEALTH SERVICES | Facility: HOSPITAL | Age: 76
End: 2021-12-03
Payer: MEDICARE

## 2021-12-09 ENCOUNTER — TELEPHONE (OUTPATIENT)
Dept: INTERNAL MEDICINE | Facility: CLINIC | Age: 76
End: 2021-12-09
Payer: MEDICARE

## 2021-12-09 RX ORDER — PROMETHAZINE HYDROCHLORIDE 25 MG/1
25 TABLET ORAL EVERY 6 HOURS PRN
Qty: 30 TABLET | Refills: 0 | Status: SHIPPED | OUTPATIENT
Start: 2021-12-09 | End: 2022-01-31 | Stop reason: SDUPTHER

## 2021-12-13 ENCOUNTER — TELEPHONE (OUTPATIENT)
Dept: PAIN MEDICINE | Facility: CLINIC | Age: 76
End: 2021-12-13
Payer: MEDICARE

## 2021-12-14 ENCOUNTER — OFFICE VISIT (OUTPATIENT)
Dept: PAIN MEDICINE | Facility: CLINIC | Age: 76
End: 2021-12-14
Payer: MEDICARE

## 2021-12-14 VITALS
BODY MASS INDEX: 34.95 KG/M2 | HEIGHT: 60 IN | TEMPERATURE: 98 F | WEIGHT: 178 LBS | HEART RATE: 89 BPM | DIASTOLIC BLOOD PRESSURE: 69 MMHG | SYSTOLIC BLOOD PRESSURE: 130 MMHG | RESPIRATION RATE: 18 BRPM

## 2021-12-14 DIAGNOSIS — E11.42 DIABETIC POLYNEUROPATHY ASSOCIATED WITH TYPE 2 DIABETES MELLITUS: ICD-10-CM

## 2021-12-14 DIAGNOSIS — M54.15 RADICULOPATHY OF THORACOLUMBAR REGION: Primary | ICD-10-CM

## 2021-12-14 DIAGNOSIS — M31.0 LEUCOCYTOCLASTIC VASCULITIS: ICD-10-CM

## 2021-12-14 DIAGNOSIS — M51.36 DEGENERATION OF INTERVERTEBRAL DISC OF LUMBAR REGION: ICD-10-CM

## 2021-12-14 DIAGNOSIS — M79.2 NEUROPATHIC PAIN: ICD-10-CM

## 2021-12-14 DIAGNOSIS — E08.42 DIABETIC POLYNEUROPATHY ASSOCIATED WITH DIABETES MELLITUS DUE TO UNDERLYING CONDITION: ICD-10-CM

## 2021-12-14 DIAGNOSIS — M79.7 FIBROMYALGIA: ICD-10-CM

## 2021-12-14 DIAGNOSIS — E11.40 PAINFUL DIABETIC NEUROPATHY: ICD-10-CM

## 2021-12-14 PROCEDURE — 99999 PR PBB SHADOW E&M-EST. PATIENT-LVL IV: CPT | Mod: PBBFAC,,, | Performed by: NURSE PRACTITIONER

## 2021-12-14 PROCEDURE — 99214 OFFICE O/P EST MOD 30 MIN: CPT | Mod: PBBFAC | Performed by: NURSE PRACTITIONER

## 2021-12-14 PROCEDURE — 99999 PR PBB SHADOW E&M-EST. PATIENT-LVL IV: ICD-10-PCS | Mod: PBBFAC,,, | Performed by: NURSE PRACTITIONER

## 2021-12-14 PROCEDURE — 99213 OFFICE O/P EST LOW 20 MIN: CPT | Mod: S$PBB,,, | Performed by: NURSE PRACTITIONER

## 2021-12-14 PROCEDURE — 99213 PR OFFICE/OUTPT VISIT, EST, LEVL III, 20-29 MIN: ICD-10-PCS | Mod: S$PBB,,, | Performed by: NURSE PRACTITIONER

## 2021-12-14 RX ORDER — HYDROCODONE BITARTRATE AND ACETAMINOPHEN 10; 325 MG/1; MG/1
1 TABLET ORAL EVERY 8 HOURS PRN
Qty: 90 TABLET | Refills: 0 | Status: SHIPPED | OUTPATIENT
Start: 2021-12-14 | End: 2022-01-07 | Stop reason: SDUPTHER

## 2021-12-14 RX ORDER — METHOCARBAMOL 500 MG/1
500 TABLET, FILM COATED ORAL 3 TIMES DAILY
Qty: 90 TABLET | Refills: 1 | Status: SHIPPED | OUTPATIENT
Start: 2021-12-14 | End: 2022-03-16

## 2021-12-14 RX ORDER — PREGABALIN 150 MG/1
150 CAPSULE ORAL 3 TIMES DAILY
Qty: 90 CAPSULE | Refills: 1 | Status: SHIPPED | OUTPATIENT
Start: 2021-12-14 | End: 2022-02-14 | Stop reason: SDUPTHER

## 2021-12-21 ENCOUNTER — OFFICE VISIT (OUTPATIENT)
Dept: PAIN MEDICINE | Facility: CLINIC | Age: 76
End: 2021-12-21
Payer: MEDICARE

## 2021-12-21 ENCOUNTER — TELEPHONE (OUTPATIENT)
Dept: PAIN MEDICINE | Facility: CLINIC | Age: 76
End: 2021-12-21
Payer: MEDICARE

## 2021-12-21 ENCOUNTER — HOSPITAL ENCOUNTER (EMERGENCY)
Facility: OTHER | Age: 76
Discharge: HOME | End: 2021-12-21
Attending: EMERGENCY MEDICINE
Payer: MEDICARE

## 2021-12-21 VITALS
DIASTOLIC BLOOD PRESSURE: 78 MMHG | RESPIRATION RATE: 12 BRPM | HEART RATE: 82 BPM | BODY MASS INDEX: 31.41 KG/M2 | TEMPERATURE: 98 F | SYSTOLIC BLOOD PRESSURE: 165 MMHG | OXYGEN SATURATION: 95 % | WEIGHT: 160 LBS | HEIGHT: 60 IN

## 2021-12-21 VITALS
DIASTOLIC BLOOD PRESSURE: 69 MMHG | HEART RATE: 93 BPM | SYSTOLIC BLOOD PRESSURE: 123 MMHG | TEMPERATURE: 98 F | WEIGHT: 176.38 LBS | RESPIRATION RATE: 18 BRPM | BODY MASS INDEX: 34.63 KG/M2 | HEIGHT: 60 IN

## 2021-12-21 DIAGNOSIS — R26.9 GAIT ABNORMALITY: Primary | ICD-10-CM

## 2021-12-21 DIAGNOSIS — M25.552 LEFT HIP PAIN: ICD-10-CM

## 2021-12-21 DIAGNOSIS — M25.562 ACUTE PAIN OF LEFT KNEE: Primary | ICD-10-CM
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
## 2021-12-21 DIAGNOSIS — S93.402A SPRAIN OF LEFT ANKLE, UNSPECIFIED LIGAMENT, INITIAL ENCOUNTER: ICD-10-CM

## 2021-12-21 LAB
ANION GAP SERPL CALC-SCNC: 18 MMOL/L (ref 8–16)
BASOPHILS # BLD AUTO: 0.04 K/UL (ref 0–0.2)
BASOPHILS NFR BLD: 0.2 % (ref 0–1.9)
BUN SERPL-MCNC: 19 MG/DL (ref 8–23)
CALCIUM SERPL-MCNC: 8.8 MG/DL (ref 8.7–10.5)
CHLORIDE SERPL-SCNC: 86 MMOL/L (ref 95–110)
CO2 SERPL-SCNC: 25 MMOL/L (ref 23–29)
CREAT SERPL-MCNC: 1.3 MG/DL (ref 0.5–1.4)
CTP QC/QA: YES
DIFFERENTIAL METHOD: ABNORMAL
EOSINOPHIL # BLD AUTO: 0.4 K/UL (ref 0–0.5)
EOSINOPHIL NFR BLD: 2.4 % (ref 0–8)
ERYTHROCYTE [DISTWIDTH] IN BLOOD BY AUTOMATED COUNT: 14.2 % (ref 11.5–14.5)
EST. GFR  (AFRICAN AMERICAN): 46 ML/MIN/1.73 M^2
EST. GFR  (NON AFRICAN AMERICAN): 40 ML/MIN/1.73 M^2
GLUCOSE SERPL-MCNC: 142 MG/DL (ref 70–110)
HCT VFR BLD AUTO: 31 % (ref 37–48.5)
HGB BLD-MCNC: 10.7 G/DL (ref 12–16)
IMM GRANULOCYTES # BLD AUTO: 0.12 K/UL (ref 0–0.04)
IMM GRANULOCYTES NFR BLD AUTO: 0.7 % (ref 0–0.5)
LYMPHOCYTES # BLD AUTO: 2.9 K/UL (ref 1–4.8)
LYMPHOCYTES NFR BLD: 17.5 % (ref 18–48)
MCH RBC QN AUTO: 28.2 PG (ref 27–31)
MCHC RBC AUTO-ENTMCNC: 34.5 G/DL (ref 32–36)
MCV RBC AUTO: 82 FL (ref 82–98)
MONOCYTES # BLD AUTO: 1.1 K/UL (ref 0.3–1)
MONOCYTES NFR BLD: 6.4 % (ref 4–15)
NEUTROPHILS # BLD AUTO: 12 K/UL (ref 1.8–7.7)
NEUTROPHILS NFR BLD: 72.8 % (ref 38–73)
NRBC BLD-RTO: 0 /100 WBC
PLATELET # BLD AUTO: 267 K/UL (ref 150–450)
PMV BLD AUTO: 11.5 FL (ref 9.2–12.9)
POTASSIUM SERPL-SCNC: 4 MMOL/L (ref 3.5–5.1)
RBC # BLD AUTO: 3.79 M/UL (ref 4–5.4)
SARS-COV-2 RDRP RESP QL NAA+PROBE: NEGATIVE
SODIUM SERPL-SCNC: 129 MMOL/L (ref 136–145)
WBC # BLD AUTO: 16.53 K/UL (ref 3.9–12.7)

## 2021-12-21 PROCEDURE — 96372 THER/PROPH/DIAG INJ SC/IM: CPT | Mod: 59

## 2021-12-21 PROCEDURE — 80048 BASIC METABOLIC PNL TOTAL CA: CPT | Performed by: EMERGENCY MEDICINE

## 2021-12-21 PROCEDURE — 93010 EKG 12-LEAD: ICD-10-PCS | Mod: ,,, | Performed by: INTERNAL MEDICINE

## 2021-12-21 PROCEDURE — 93010 ELECTROCARDIOGRAM REPORT: CPT | Mod: ,,, | Performed by: INTERNAL MEDICINE

## 2021-12-21 PROCEDURE — 99285 EMERGENCY DEPT VISIT HI MDM: CPT | Mod: 25,27

## 2021-12-21 PROCEDURE — 93005 ELECTROCARDIOGRAM TRACING: CPT

## 2021-12-21 PROCEDURE — 99999 PR PBB SHADOW E&M-EST. PATIENT-LVL V: CPT | Mod: PBBFAC,,, | Performed by: NURSE PRACTITIONER

## 2021-12-21 PROCEDURE — 99999 PR PBB SHADOW E&M-EST. PATIENT-LVL V: ICD-10-PCS | Mod: PBBFAC,,, | Performed by: NURSE PRACTITIONER

## 2021-12-21 PROCEDURE — 85025 COMPLETE CBC W/AUTO DIFF WBC: CPT | Performed by: EMERGENCY MEDICINE

## 2021-12-21 PROCEDURE — 99213 OFFICE O/P EST LOW 20 MIN: CPT | Mod: S$PBB,,, | Performed by: NURSE PRACTITIONER

## 2021-12-21 PROCEDURE — 63600175 PHARM REV CODE 636 W HCPCS: Performed by: EMERGENCY MEDICINE

## 2021-12-21 PROCEDURE — U0002 COVID-19 LAB TEST NON-CDC: HCPCS | Performed by: EMERGENCY MEDICINE

## 2021-12-21 PROCEDURE — 99213 PR OFFICE/OUTPT VISIT, EST, LEVL III, 20-29 MIN: ICD-10-PCS | Mod: S$PBB,,, | Performed by: NURSE PRACTITIONER

## 2021-12-21 PROCEDURE — 99215 OFFICE O/P EST HI 40 MIN: CPT | Mod: PBBFAC,25 | Performed by: NURSE PRACTITIONER

## 2021-12-21 RX ORDER — ORPHENADRINE CITRATE 30 MG/ML
30 INJECTION INTRAMUSCULAR; INTRAVENOUS
Status: COMPLETED | OUTPATIENT
Start: 2021-12-21 | End: 2021-12-21

## 2021-12-21 RX ORDER — ORPHENADRINE CITRATE 30 MG/ML
30 INJECTION INTRAMUSCULAR; INTRAVENOUS
Status: DISCONTINUED | OUTPATIENT
Start: 2021-12-21 | End: 2021-12-21

## 2021-12-21 RX ADMIN — ORPHENADRINE CITRATE 30 MG: 30 INJECTION INTRAMUSCULAR; INTRAVENOUS at 01:12

## 2021-12-21 NOTE — ED PROVIDER NOTES
"SCRIBE #1 NOTE: IJose Miguel, am scribing for, and in the presence of, William Leigh DO.         Source of History:  Patient and patient's daughter.     Chief complaint:  Fall (Fall last PM, unknown if 2/2 to syncope or trip/slip, pt difficulty with remembering events prior to fall. New onset emesis this AM. Pt is poor historian. Sent by PCP for eval. )      HPI:  Indira Waters is a 76 y.o. female presenting with fall. Patient states she lost her balance last night and landed on her buttock. She is not sure if she had a trauma to the head following the fall. She notes pain in the left lower extremity and the tail bone that started following the fall. Family found the patient laying on her butt on the floor. Family also notes "shaking tremors" on bilateral arms that started after the fall. She was at her PCP's clinic today where she vomited three times. Patient states she felt nauseated this morning before she went to the PCP's office. Patient was recommended by her primary care provider to come to ER for evaluations. This is the extent to the patients complaints today here in the emergency department.    ROS: As per HPI and below:  Constitutional: No fever.  No chills.  Eyes: No visual changes.  ENT: No sore throat. No ear pain    Cardiovascular: No chest pain.  Respiratory: No shortness of breath.  GI: No abdominal pain. Nausea and vomiting.  Genitourinary: No abnormal urination.  Neurologic: No headache. No focal weakness.  No numbness.  MSK: no back pain. Knee pain. Hip pain. Shaking tremors.   Integument: No rashes or lesions.  Hematologic: No easy bruising.  Endocrine: No excessive thirst or urination.    Review of patient's allergies indicates:   Allergen Reactions    Bleach (sodium hypochlorite) Shortness Of Breath    Nitrofurantoin macrocrystalline Anaphylaxis    Lipitor [atorvastatin] Diarrhea and Rash    Nsaids (non-steroidal anti-inflammatory drug)     Pcn [penicillins]     " Toradol [ketorolac]        PMH:  As per HPI and below:  Past Medical History:   Diagnosis Date    Arthritis     Back pain     Cancer     ovarian    Depression     Diabetes mellitus     Fibromyalgia     Hyperlipidemia     Hypertension     Lupus     Stroke     slight left sided weakness     Past Surgical History:   Procedure Laterality Date    APPENDECTOMY       SECTION      HYSTERECTOMY      INJECTION OF ANESTHETIC AGENT AROUND NERVE Bilateral 2021    Procedure: BLOCK, NERVE, SYMPATHIC;  Surgeon: Holden Pereira MD;  Location: [facility] PAIN [facility]GT;  Service: Pain Management;  Laterality: Bilateral;    INJECTION OF ANESTHETIC AGENT AROUND NERVE N/A 2021    Procedure: BLOCK, NERVE, SYMPATHETIC  need consent;  Surgeon: Holden Pereira MD;  Location: [facility] PAIN [facility]GT;  Service: Pain Management;  Laterality: N/A;    VA EVAL,SWALLOW FUNCTION,CINE/VIDEO RECORD  2021         TONSILLECTOMY         Social History     Tobacco Use    Smoking status: Former Smoker     Quit date: 2020     Years since quittin.1    Smokeless tobacco: Never Used   Substance Use Topics    Alcohol use: No    Drug use: No       Physical Exam:    BP (!) 188/78   Pulse 83   Temp 98.1 °F (36.7 °C) (Oral)   Resp 13   Ht 5' (1.524 m)   Wt 72.6 kg (160 lb)   SpO2 97%   BMI 31.25 kg/m²   Nursing note and vital signs reviewed.  Constitutional: No acute distress. Somnolent. Non-toxic appearance.   Eyes: No conjunctival injection.Extraocular muscles are intact.  ENT: Oropharynx clear.  Normal phonation.   Cardiovascular: Regular rate and rhythm.  No murmurs. No gallops. No rubs  Respiratory: Clear to auscultation bilaterally.  Good air movement. No wheezes. No rhonchi. No rales. No accessory muscle use. No respiratory distress.  Abdomen: Soft. Not distended. Nontender. No guarding.  No rebound. Non-peritoneal.  Musculoskeletal: Tenderness to palpation of left lower extremity, specifically left knee and left hip.  no edema. Good range of motion all other joints. No deformities.    Neck supple. No meningismus.  Skin: No rashes seen.  Good turgor.  No abrasions.  No ecchymoses.  Neurologic: Motor intact.  Sensation intact.  No ataxia. No focal neurological deficits. Alert and oriented to person, place and time answering questions appropriately..   Psych: Appropriate, conversant    Labs that have been ordered have been independently reviewed and interpreted by myself.    I decided to obtain the patient's medical records.  Summary of Medical Records:  Patient seen by pain management for chronic pain treated with opiates.    Seen a pain management clinic prior to arrival.  Refill of muscle relaxants as well as Norco.    09/30/2021 hospitalization admitted for CHF.    Initial Impression/ Differential Dx MDM:  Hip fracture, knee fracture.  The sounds that the patient had a mechanical fall.  I am concerned of overuse of opiate medication inducing her somnolence.  Will give muscle or accident for pain and get x-rays as well as labs get a COVID and observed.      ED Course as of 12/21/21 1519   Tue Dec 21, 2021   1349 SARS-CoV-2 RNA, Amplification, Qual: Negative [SM]   1349 X-Ray Hip 2 or 3 views Left (with Pelvis when performed)  X-ray of the hip independently interpreted by myself shows no fracture or dislocation. [SM]   1350 X-Ray Knee 1 or 2 View Left  X-ray of the knee independently interpreted by myself shows no fracture or dislocation. [SM]   1358 EKG 12-lead  EKG independently interpreted by myself shows normal sinus rhythm at a rate of 82, normal intervals, narrow QRS, no acute ST T wave abnormalities.  Compared to an EKG on September 30, 2021 shows no significant change. [SM]   1402 WBC(!): 16.53 [SM]   1402 Hemoglobin(!): 10.7 [SM]   1419 Updated the patient and her daughter on results and plan of care. [SM]   1440 X-Ray Chest AP Portable  Chest x-ray independently interpreted by myself shows normal cardiac size, no  infiltrate or focal consolidation, no pneumothorax, no bony abnormalities.  Overall impression negative chest x-ray. [SM]   1440 Sodium(!): 129 [SM]   1441 Anion Gap(!): 18 [SM]   1441 Creatinine: 1.3  Slight RANJEET with a baseline of 0.92 months ago. [SM]   1517 Patient doing well.  I discussed with the patient discharge planning.  I offered admission to the hospital if she felt she would have a difficult time getting around.  Patient deals with chronic pain and she and her daughter both agreed that she would prefer to go home and they felt comfortable with this.  The patient's oxygen saturations were about 91%.  I had her take a few deep breaths and she easily pops up to 97%.  I do suspect this is secondary to being a little bit more somnolent secondary to the pain medication.  I had a long discussion with this regarding not overdoing on the hydrocodone.  Patient denies any shortness of breath and states this is her baseline.  Strict return precautions given.  precautions were given.     No further workup indicated based on their complaints or examination today.    Patient to be discharged home in stable condition. Diagnosis and treatment plan explained to patient and/or family member who is at bedside. I have answered all questions and the patient is satisfied with the plan of care. Strict return precautions given. The patient demonstrates understanding of the care plan. [SM]      ED Course User Index  [SM] William Leigh, DO                Scribe Attestation:   Scribe #1: I performed the above scribed service and the documentation accurately describes the services I performed. I attest to the accuracy of the note.    Physician Attestation for Scribe: I, Dr Leigh, reviewed documentation as scribed in my presence, which is both accurate and complete.  Diagnostic Impression:    1. Acute pain of left knee    2. Fall    3. Left hip pain         ED Disposition Condition    Discharge Stable          ED  Prescriptions     None        Follow-up Information     Follow up With Specialties Details Why Contact Info    [facility] - Emergency Dept Emergency Medicine  [facility] symptoms worsen [address] [facility] [id number] [phone]           William Leigh,   12/21/21 [id number]

## 2021-12-21 NOTE — ED TRIAGE NOTES
Pt presents to ED accompanied by daughter with c/o fall last PM. Pt reports bilateral knee pain L greater than R since fall. Pt unsure if LOC or if trip and fall. Pt unsure if she hit her head. Pts daugher reports pt has been confused since fall and is not following commands. At present pt is AAOx4, though poor historian, and is able to follow commands. Pts daughter also report pt had episode of emesis while pt at pain management MD this AM.

## 2021-12-23 ENCOUNTER — TELEPHONE (OUTPATIENT)
Dept: INTERNAL MEDICINE | Facility: CLINIC | Age: 76
End: 2021-12-23
Payer: MEDICARE

## 2022-01-07 RX ORDER — HYDROCODONE BITARTRATE AND ACETAMINOPHEN 10; 325 MG/1; MG/1
1 TABLET ORAL EVERY 8 HOURS PRN
Qty: 90 TABLET | Refills: 0 | Status: SHIPPED | OUTPATIENT
Start: 2022-01-07 | End: 2022-02-14 | Stop reason: SDUPTHER

## 2022-01-12 ENCOUNTER — TELEPHONE (OUTPATIENT)
Dept: ADMINISTRATIVE | Facility: HOSPITAL | Age: 77
End: 2022-01-12
Payer: MEDICARE

## 2022-01-13 DIAGNOSIS — F41.9 ANXIETY DISORDER, UNSPECIFIED TYPE: ICD-10-CM

## 2022-01-13 RX ORDER — SERTRALINE HYDROCHLORIDE 100 MG/1
100 TABLET, FILM COATED ORAL DAILY
Qty: 90 TABLET | Refills: 3 | Status: SHIPPED | OUTPATIENT
Start: 2022-01-13 | End: 2022-10-24 | Stop reason: SDUPTHER

## 2022-01-31 DIAGNOSIS — E11.59 TYPE 2 DIABETES MELLITUS WITH OTHER CIRCULATORY COMPLICATION, WITHOUT LONG-TERM CURRENT USE OF INSULIN: ICD-10-CM

## 2022-01-31 RX ORDER — INSULIN DETEMIR 100 [IU]/ML
14 INJECTION, SOLUTION SUBCUTANEOUS 2 TIMES DAILY
Qty: 15 ML | Refills: 3 | Status: SHIPPED | OUTPATIENT
Start: 2022-01-31 | End: 2022-04-20

## 2022-01-31 RX ORDER — PROMETHAZINE HYDROCHLORIDE 25 MG/1
25 TABLET ORAL EVERY 6 HOURS PRN
Qty: 30 TABLET | Refills: 0 | Status: SHIPPED | OUTPATIENT
Start: 2022-01-31 | End: 2022-02-20 | Stop reason: SDUPTHER

## 2022-01-31 NOTE — TELEPHONE ENCOUNTER
Care Due:                  Date            Visit Type   Department     Provider  --------------------------------------------------------------------------------                                GISELLE LEE      [facility] INTERNAL  Last Visit: 11-      VISIT        MEDICINE       Chun Zapata  Next Visit: None Scheduled  None         None Found                                                            Last  Test          Frequency    Reason                     Performed    Due Date  --------------------------------------------------------------------------------    HBA1C.......  6 months...  insulin..................  10-   04-    Powered by [email or web address]. Reference number: [id number].   1/31/2022 1:14:13 PM CST

## 2022-02-14 ENCOUNTER — OFFICE VISIT (OUTPATIENT)
Dept: PAIN MEDICINE | Facility: CLINIC | Age: 77
End: 2022-02-14
Payer: MEDICARE

## 2022-02-14 VITALS
RESPIRATION RATE: 18 BRPM | WEIGHT: 165 LBS | TEMPERATURE: 98 F | HEIGHT: 60 IN | SYSTOLIC BLOOD PRESSURE: 151 MMHG | HEART RATE: 88 BPM | DIASTOLIC BLOOD PRESSURE: 81 MMHG | BODY MASS INDEX: 32.39 KG/M2

## 2022-02-14 DIAGNOSIS — E11.42 DIABETIC POLYNEUROPATHY ASSOCIATED WITH TYPE 2 DIABETES MELLITUS: ICD-10-CM

## 2022-02-14 DIAGNOSIS — Z51.81 ENCOUNTER FOR THERAPEUTIC DRUG MONITORING: ICD-10-CM

## 2022-02-14 DIAGNOSIS — E11.65 TYPE 2 DIABETES MELLITUS WITH HYPERGLYCEMIA, WITH LONG-TERM CURRENT USE OF INSULIN: Chronic | ICD-10-CM

## 2022-02-14 DIAGNOSIS — G89.4 CHRONIC PAIN SYNDROME: ICD-10-CM

## 2022-02-14 DIAGNOSIS — E08.42 DIABETIC POLYNEUROPATHY ASSOCIATED WITH DIABETES MELLITUS DUE TO UNDERLYING CONDITION: Primary | ICD-10-CM

## 2022-02-14 DIAGNOSIS — Z79.4 TYPE 2 DIABETES MELLITUS WITH HYPERGLYCEMIA, WITH LONG-TERM CURRENT USE OF INSULIN: Chronic | ICD-10-CM

## 2022-02-14 PROCEDURE — 99215 OFFICE O/P EST HI 40 MIN: CPT | Mod: PBBFAC | Performed by: NURSE PRACTITIONER

## 2022-02-14 PROCEDURE — 80307 DRUG TEST PRSMV CHEM ANLYZR: CPT | Performed by: NURSE PRACTITIONER

## 2022-02-14 PROCEDURE — 99999 PR PBB SHADOW E&M-EST. PATIENT-LVL V: ICD-10-PCS | Mod: PBBFAC,,, | Performed by: NURSE PRACTITIONER

## 2022-02-14 PROCEDURE — 99214 PR OFFICE/OUTPT VISIT, EST, LEVL IV, 30-39 MIN: ICD-10-PCS | Mod: S$PBB,,, | Performed by: NURSE PRACTITIONER

## 2022-02-14 PROCEDURE — 99214 OFFICE O/P EST MOD 30 MIN: CPT | Mod: S$PBB,,, | Performed by: NURSE PRACTITIONER

## 2022-02-14 PROCEDURE — 99999 PR PBB SHADOW E&M-EST. PATIENT-LVL V: CPT | Mod: PBBFAC,,, | Performed by: NURSE PRACTITIONER

## 2022-02-14 RX ORDER — METHOCARBAMOL 500 MG/1
500 TABLET, FILM COATED ORAL 3 TIMES DAILY PRN
Qty: 90 TABLET | Refills: 0 | Status: SHIPPED | OUTPATIENT
Start: 2022-02-14 | End: 2022-04-14 | Stop reason: SDUPTHER

## 2022-02-14 RX ORDER — PREGABALIN 150 MG/1
150 CAPSULE ORAL 3 TIMES DAILY
Qty: 90 CAPSULE | Refills: 2 | Status: SHIPPED | OUTPATIENT
Start: 2022-02-14 | End: 2022-04-14 | Stop reason: SDUPTHER

## 2022-02-14 RX ORDER — HYDROCODONE BITARTRATE AND ACETAMINOPHEN 10; 325 MG/1; MG/1
1 TABLET ORAL EVERY 8 HOURS PRN
Qty: 90 TABLET | Refills: 0 | Status: SHIPPED | OUTPATIENT
Start: 2022-02-14 | End: 2022-03-10 | Stop reason: SDUPTHER

## 2022-02-14 RX ORDER — HYDROCODONE BITARTRATE AND ACETAMINOPHEN 10; 325 MG/1; MG/1
1 TABLET ORAL EVERY 8 HOURS PRN
Qty: 90 TABLET | Refills: 0 | Status: SHIPPED | OUTPATIENT
Start: 2022-02-14 | End: 2022-02-14

## 2022-02-14 NOTE — PROGRESS NOTES
Chronic patient Established Note (Follow up visit)      SUBJECTIVE:    Interval History 2/14/2022:  Mrs Waters presents for follow up. Pt states overall neuropathy continues. Since last visit she has been stable with medication mgt and takign Norco 10/325mg TID, Robaxin 500mg TID and Lyrica 150mg TID. She denies new areas of pain or neurological changes. Medication adequate to control pain without adverse SE.     Interval History 12/21/2021:  Pt presents for urgent evaluation s/p fall in kitchen last night. Pt unsure of LOC or head trauma but states some amnesia of events. Pt is having left knee pain, B ankle pain L>R and lower back/buttock pain. Daughter further states tremor like activity last night. During visit daughter asked for bedside commode due to inability to ambulate.  Pt during visit appeared somnolent, pale and began vomiting. Discussed going to ER for further evaluation.     Interval History 12/14/2021:  Mrs Waters presents for follow up of chronic pain complaints. She is hopeful she will have A1C lower soon to move forward with SCS. She is doing fair with medication mgt alone at this time and denies SE of medications. Pt is currently taking Norco 10/325mg TID PRN, Lyrica 150mg TID, and Robaxin 500mg TID. She denies new areas of pain or neurological changes.     Interval History 10/14/2021:  The Pt presents for follow up and s/p B Lumbar sympathetic blocks. Pt states this has done well but ready to proceed with SCS trial. She is aware A1C must be under tight control and Pt and daughter re-iterate knowing this. Pt also mentions DKA admission and diagnosis of vasculitis as well over interval. Pt continues to take hydrocodone 10/325 mg TID PRN, Lyrica 150 mg TID, and Robaxin 500mg TID. She denies any SE of medication, denies new neurological changes.     Interval Hx 09/16/2021  Indira Waters presents to the clinic for a follow-up appointment for f/u after bilateral lumbar sympathetic block on  8/25/21.Patient reports >50% relief after lumbar sympathetic block that lasted 2 weeks when she then developed a UTI/DKA, was admitted to the hospital and pain recurred.  Current pain intensity is 8/10.    Pain Disability Index Review:  Last 3 PDI Scores 2/14/2022 12/21/2021 12/14/2021   Pain Disability Index (PDI) 56 33 45     Interval History 8/12/2021:  Indira Waters presents to the clinic for a follow-up appointment for BLE diabetic neuropathy, painful. Continues with Norco 10/325mg, Lyrica 150mg TID, Robaxin 750mg daily PRN. She had to cancel previously scheduled lumbar sympathetic block 2/2 lithotripsy for painful kidney stones, which have now passed. She still has intermittent pain with urination but overall that pain is improving. She had to cancel previous angiogram for this same reason - this has not been rescheduled yet. She denies any change in her LE pain. Denies any new neurological sxs in BLEs.     Pain Disability Index Review:  Last 3 PDI Scores 8/12/2021 6/10/2021 4/15/2021   Pain Disability Index (PDI) 45 37 55       Interval History 6/10/21:  Indira Waters presents to the clinic for a 2 week follow-up appointment from lumbar sympathetic nerve block in early May. She reports minimal relief from this intervention, but did note that it helped some, briefly. Since the last visit, Indira Waters states the pain has been persistant. Current pain intensity is 10/10. Patient has chronic generalized diffuse pain, most pronounced in her BL lower extremities 2/2 diabetic neuropathy. HSe states that the pain is a little better than at her last visit. Most days are 10/10, but some days are better than others. Her pain is aggravated by exertion, walking, or sitting/standing for extended periods of time. He pain is mildly improved by rest and medications. She is currently taking Lyrica 150 PO TID, Zoloft, and Norco 10-325mg TID PRN. She states that the pain meds are helping but she is still constantly in  "pain and unable to participate in her ADLs. She has now established care with PCP for assistance w/ poorly controlled T2DM, last A1c 11.4. She has not yet established care w/ Rheumatology for fibromyalgia management.     Interval HPI 4/15/21  Patient returns for follow up of chronic generalized diffuse pain. At the last visit, had EMG (not yet completed), lumbar and hip x-ray imaging ordered. She was also referred to Rheumatology for fibromyalgia management and referral to PCP for DM2 management and weaning of zoloft for transition to cymbalta for treatment of neuropathy. She had her Lyrica increased to 150mg PO TID, and prescribed Norco 10mg TID with opioid contract signed.      She has been taking Norco 10mg TID and it has been helping her "bad" pains but does not take all of her pain away. She has not yet been set up with her new PCP or rheumatologist yet for fibromyalgia. Still continues to have generalized pain everywhere including feet, hips, legs, lower and upper back, neck and shoulder pain. Continues to have neuropathy in the bilateral lower extremities due to uncontrolled DM2.     Initial Visit 3/11/21  Indira Waters presents to the clinic for the evaluation of chronic pain. Complaining of pain everywhere including feet, legs, hips, lower and upper back, neck, shoulder. Pain started 5+ years ago. Pain 10/10 at worse. She was referred here by her PCP Dr. Ramos who she has been seeing for over 6 years. She states her pain is aggravated by any physical activity/movement. She takes lyrica, robaxin, and Norco 10 TID with mild relief of pain. Per patient, she was referred here by her PCP for medication refill. She has not tried physical therapy for several years, she states it did not help last time she was in PT. She says she is trying to exercise daily by doing yoga. She walks with a walker. She has numerous comorbidities including DM2 (Last A1C 9+ per prior family medicine note in Jan 2021), fibromyalgia, " lupus, DDD. She states she would like to find a new PCP as she no longer lives near her old one.      Patient denies night fever/night sweats, urinary incontinence, bowel incontinence and significant weight loss.       Pain Medications:  Opioids: Lortab ( Hydrocodone/Acetaminophen)        Opioid Contract: yes      report:  Reviewed and consistent with medication use as prescribed.     Pain Procedures:    - reports possible back injection 10+ years ago  - Lumbar sympathetic nerve block 05/05/21 - Dr. Pereira - minimal relief     Physical Therapy/Home Exercise: no     Imaging:   Hip X-ray 4/7/21  FINDINGS:  Osseous structures appear intact without evidence of fracture or osseous destructive process.  No apparent dislocation.     Modest degenerative change or significant joint space narrowing.     Lumbar X-ray 4/7/21  FINDINGS:  Diffuse bony demineralization.  Vertebral bodies are normal in height without evidence of fracture.  No pars defects.     Normal sagittal alignment is preserved.  No spondylolisthesis. No abnormal translation with flexion and extension.     Intervertebral disc heights are well maintained.  Mild facet arthropathy in the lower lumbar spine.     Mild scattered vascular calcification.     Impression:     No evidence of fracture or malalignment.     Mild facet arthropathy in the lower lumbar spine.     Diffuse bony demineralization.  Consider correlation with DEXA.        Review of patient's allergies indicates:   Allergen Reactions    Bleach (sodium hypochlorite) Shortness Of Breath    Nitrofurantoin macrocrystalline Anaphylaxis    Lipitor [atorvastatin] Diarrhea and Rash    Nsaids (non-steroidal anti-inflammatory drug)     Pcn [penicillins]     Toradol [ketorolac]        Current Medications:   Current Outpatient Medications   Medication Sig Dispense Refill    acetaminophen (TYLENOL) 500 MG tablet Take 2 tablets (1,000 mg total) by mouth every 8 (eight) hours as needed for Pain.  0     aspirin (ECOTRIN) 81 MG EC tablet Take 1 tablet (81 mg total) by mouth once daily. 30 tablet 0    blood sugar diagnostic Strp 1 each by Misc.(Non-Drug; Combo Route) route 4 (four) times daily. 200 each 0    blood-glucose meter Misc Follow package directions (Patient taking differently: Follow package directions) 1 each 0    blood-glucose meter,continuous (DEXCOM G6 ) Misc 1 each by Misc.(Non-Drug; Combo Route) route continuous prn. 1 each PRN    blood-glucose sensor (DEXCOM G6 SENSOR) Nicole 3 each by Misc.(Non-Drug; Combo Route) route continuous prn. Change every 10 days. 3 each PRN    blood-glucose transmitter (DEXCOM G6 TRANSMITTER) Nicole 1 each by Misc.(Non-Drug; Combo Route) route continuous prn. 1 each PRN    cloNIDine (CATAPRES) 0.1 MG tablet Take 1 tablet (0.1 mg total) by mouth 2 (two) times daily. 180 tablet 1    evolocumab (REPATHA SYRINGE) 140 mg/mL Syrg Inject 140 mLs into the skin every 14 (fourteen) days. 2 mL 11    furosemide (LASIX) 40 MG tablet Take 1 tablet (40 mg total) by mouth once daily. 30 tablet 11    insulin aspart U-100 (NOVOLOG) 100 unit/mL (3 mL) InPn pen Inject 10 Units into the skin 3 (three) times daily with meals. Plus correction scale. Max TDD 40units. 15 mL 3    insulin detemir U-100 (LEVEMIR FLEXTOUCH U-100 INSULN) 100 unit/mL (3 mL) InPn pen Inject 14 Units into the skin 2 (two) times daily. 15 mL 3    ipratropium-albuteroL (COMBIVENT)  mcg/actuation inhaler Inhale 1 puff into the lungs by mouth every 6 (six) hours. 4 g 0    lancets 33 gauge Misc Use 4 (four) times daily. 200 each 0    tyxzhigvf-P8-hpE21-algal oil (METANX/FOLTANX RF) 3 mg-35 mg-2 mg -90.314 mg Cap Take 1 capsule by mouth once daily. For neuropathy in feet. 30 capsule 3    losartan (COZAAR) 50 MG tablet Take 1 tablet (50 mg total) by mouth once daily. 90 tablet 0    NIFEdipine (PROCARDIA-XL) 30 MG (OSM) 24 hr tablet Take 1 tablet (30 mg total) by mouth 2 (two) times a day. 180 tablet 0  "   nitroGLYCERIN (NITROSTAT) 0.4 MG SL tablet Place 1 tablet (0.4 mg total) under the tongue every 5 (five) minutes as needed for Chest pain. 25 tablet 0    pantoprazole (PROTONIX) 40 MG tablet Take 1 tablet (40 mg total) by mouth once daily. 30 tablet 0    pen needle, diabetic (BD ULTRA-FINE HIEN PEN NEEDLE) 32 gauge x 5/32" Ndle 1 each by Misc.(Non-Drug; Combo Route) route 4 (four) times daily. 400 each 2    promethazine (PHENERGAN) 25 MG tablet Take 1 tablet (25 mg total) by mouth every 6 (six) hours as needed for Nausea. 30 tablet 0    senna-docusate 8.6-50 mg (PERICOLACE) 8.6-50 mg per tablet Take 1 tablet by mouth 2 (two) times daily as needed for Constipation. 60 tablet 0    sertraline (ZOLOFT) 100 MG tablet Take 1 tablet (100 mg total) by mouth once daily. 90 tablet 3    triamcinolone acetonide 0.1% (KENALOG) 0.1 % cream Apply to affected areas of body twice daily as needed rash. Do not use on face, underarms, or groin. (Patient taking differently: Apply to affected areas of body twice daily as needed rash. Do not use on face, underarms, or groin.) 454 g 0    HYDROcodone-acetaminophen (NORCO)  mg per tablet Take 1 tablet by mouth every 8 (eight) hours as needed for Pain. 90 tablet 0    methocarbamoL (ROBAXIN) 500 MG Tab Take 1 tablet (500 mg total) by mouth 3 (three) times daily. 90 tablet 1    methocarbamoL (ROBAXIN) 500 MG Tab Take 1 tablet (500 mg total) by mouth 3 (three) times daily as needed (muscle spasm). 90 tablet 0    pregabalin (LYRICA) 150 MG capsule Take 1 capsule (150 mg total) by mouth 3 (three) times daily. 90 capsule 2     No current facility-administered medications for this visit.       REVIEW OF SYSTEMS:    GENERAL:  No weight loss, malaise or fevers.  HEENT:  Negative for frequent or significant headaches.  NECK:  Negative for lumps, goiter, pain and significant neck swelling.  RESPIRATORY:  Negative for cough, wheezing or shortness of breath.  CARDIOVASCULAR:  Negative " for chest pain, leg swelling or palpitations.  GI:  Negative for abdominal discomfort, blood in stools or black stools or change in bowel habits.  MUSCULOSKELETAL:  See HPI.  SKIN:  Negative for lesions, rash, and itching.  PSYCH:  Negative for sleep disturbance, mood disorder and recent psychosocial stressors.  HEMATOLOGY/LYMPHOLOGY:  Negative for prolonged bleeding, bruising easily or swollen nodes.  NEURO:   No history of headaches, syncope, paralysis, seizures or tremors.  All other reviewed and negative other than HPI.    Past Medical History:  Past Medical History:   Diagnosis Date    Arthritis     Back pain     Cancer     ovarian    Depression     Diabetes mellitus     Fibromyalgia     Hyperlipidemia     Hypertension     Lupus     Stroke     slight left sided weakness       Past Surgical History:  Past Surgical History:   Procedure Laterality Date    APPENDECTOMY       SECTION      HYSTERECTOMY      INJECTION OF ANESTHETIC AGENT AROUND NERVE Bilateral 2021    Procedure: BLOCK, NERVE, SYMPATHIC;  Surgeon: Holden Pereira MD;  Location[place] [facility], [place], [place] [facility] [place]  Service: Pain Management;  Laterality: Bilateral;    INJECTION OF ANESTHETIC AGENT AROUND NERVE N/A 2021    Procedure: BLOCK, NERVE, SYMPATHETIC  need consent;  Surgeon: Holden Pereira MD;  Location: [facility], [place], operated by [facility];  Service: Pain Management;  Laterality: N/A;    NV EVAL,SWALLOW FUNCTION,CINE/VIDEO RECORD  2021         TONSILLECTOMY         Family History:  Family History   Problem Relation Age of Onset    COPD Mother     Lupus Mother     Hernia Mother     Uterine cancer Mother         vs cervical cancer    Ovarian cancer Mother     Diabetes Father     Coronary artery disease Father     Colon cancer Maternal Grandmother         in her 50's       Social History:  Social History     Socioeconomic History    Marital status:    Tobacco Use    Smoking status: Former Smoker     Quit date: 2020     Years  since quittin.2    Smokeless tobacco: Never Used   Substance and Sexual Activity    Alcohol use: No    Drug use: No     Social Determinants of Health     Financial Resource Strain: Low Risk     Difficulty of Paying Living Expenses: Not hard at all   Food Insecurity: No Food Insecurity    Worried About Running Out of Food in the Last Year: Never true    Ran Out of Food in the Last Year: Never true   Transportation Needs: No Transportation Needs    Lack of Transportation (Medical): No    Lack of Transportation (Non-Medical): No   Physical Activity: Insufficiently Active    Days of Exercise per Week: 3 days    Minutes of Exercise per Session: 20 min   Stress: No Stress Concern Present    Feeling of Stress : Not at all   Social Connections: Socially Isolated    Frequency of Communication with Friends and Family: More than three times a week    Frequency of Social Gatherings with Friends and Family: Never    Attends Hoahaoism Services: Never    Active Member of Clubs or Organizations: No    Attends Club or Organization Meetings: Never    Marital Status:    Housing Stability: Low Risk     Unable to Pay for Housing in the Last Year: No    Number of Places Lived in the Last Year: 1    Unstable Housing in the Last Year: No       OBJECTIVE:    BP (!) 151/81   Pulse 88   Temp 98 °F (36.7 °C)   Resp 18   Ht 5' (1.524 m)   Wt 74.8 kg (165 lb)   BMI 32.22 kg/m²     PHYSICAL EXAMINATION:  Voice normal.  Normal affect without evidence of distress.  Gait: sitting in hospital wc for transport.      Prior  PHYSICAL EXAMINATION:    General appearance: Well appearing, in no acute distress, alert and oriented x3.  Psych:  Mood and affect appropriate.  Skin: Skin color, texture, turgor normal, no rashes or lesions, in both upper and lower body.  Head/face:  Atraumatic, normocephalic. No palpable lymph nodes  Neck: No pain to palpation over the cervical paraspinous muscles. Spurling Negative. No pain  with neck flexion, extension, or lateral flexion. .  Cor: RRR  Pulm: CTA  GI: Abdomen soft and non-tender.  Back: Straight leg raising in the sitting and supine positions is negative to radicular pain. No pain to palpation over the spine or costovertebral angles. Normal range of motion without pain reproduction.  Extremities: Peripheral joint ROM is full and pain free without obvious instability or laxity in all four extremities. No deformities, edema, or skin discoloration. Good capillary refill.  Musculoskeletal: Shoulder, hip, sacroiliac and knee provocative maneuvers are negative. Bilateral upper and lower extremity strength is normal and symmetric.  No atrophy or tone abnormalities are noted.  Neuro: Bilateral upper and lower extremity coordination and muscle stretch reflexes are physiologic and symmetric.  Plantar response are downgoing. No loss of sensation is noted.  Gait: Normal.    ASSESSMENT: 76 y.o. year old female with      1. Diabetic polyneuropathy associated with diabetes mellitus due to underlying condition  Hemoglobin A1C    CBC W/ AUTO DIFFERENTIAL   2. Type 2 diabetes mellitus with hyperglycemia, with long-term current use of insulin     3. Encounter for therapeutic drug monitoring  Pain Clinic Drug Screen   4. Chronic pain syndrome     5. Diabetic polyneuropathy associated with type 2 diabetes mellitus  Ambulatory referral/consult to Psychiatry           PLAN:      - Prior records reviewed  - Stable with medication mgt  - Discussed Nevro SCS implant for PDN  - Will update A1c and if <10 can proceed with trial  - Referral [;aced for  psych evaluation   - Utox updated today 2/14/2022  Continue with prior care plan  - Refill Robaxin 500mg TID- Refilled Norco 10/325mg TID #90, can call with one additional refill  - Refill Lyrica 150mg TID  - I have stressed the importance of physical activity and a home exercise plan to help with pain and improve health.  - Patient can continue with medications for  now since they are providing benefits, using them appropriately, and without side effects..    - Counseled patient regarding the importance of activity modification. Also counseled patient on improved blood glucose control. - Counseled patient regarding the importance of activity modification, constant sleeping habits and physical therapy.  - RTC 2 months with Dr Pereira to continue medication mgt.     The above plan and management options were discussed at length with patient. Patient is in agreement with the above and verbalized understanding.    Colin Gibbs  02/14/2022

## 2022-02-19 LAB
6MAM UR QL: NOT DETECTED
7AMINOCLONAZEPAM UR QL: NOT DETECTED
A-OH ALPRAZ UR QL: NOT DETECTED
ALPHA-OH-MIDAZOLAM: NOT DETECTED
ALPRAZ UR QL: NOT DETECTED
AMPHET UR QL SCN: NOT DETECTED
ANNOTATION COMMENT IMP: NORMAL
ANNOTATION COMMENT IMP: NORMAL
BARBITURATES UR QL: NOT DETECTED
BUPRENORPHINE UR QL: NOT DETECTED
BZE UR QL: NOT DETECTED
CARBOXYTHC UR QL: NOT DETECTED
CARISOPRODOL UR QL: NOT DETECTED
CLONAZEPAM UR QL: NOT DETECTED
CODEINE UR QL: NOT DETECTED
CREAT UR-MCNC: 62.7 MG/DL (ref 20–400)
DIAZEPAM UR QL: NOT DETECTED
ETHYL GLUCURONIDE UR QL: NOT DETECTED
FENTANYL UR QL: NOT DETECTED
GABAPENTIN: NOT DETECTED
HYDROCODONE UR QL: PRESENT
HYDROMORPHONE UR QL: PRESENT
LORAZEPAM UR QL: NOT DETECTED
MDA UR QL: NOT DETECTED
MDEA UR QL: NOT DETECTED
MDMA UR QL: NOT DETECTED
ME-PHENIDATE UR QL: NOT DETECTED
METHADONE UR QL: NOT DETECTED
METHAMPHET UR QL: NOT DETECTED
MIDAZOLAM UR QL SCN: NOT DETECTED
MORPHINE UR QL: NOT DETECTED
NALOXONE: NOT DETECTED
NORBUPRENORPHINE UR QL CFM: NOT DETECTED
NORDIAZEPAM UR QL: NOT DETECTED
NORFENTANYL UR QL: NOT DETECTED
NORHYDROCODONE UR QL CFM: PRESENT
NORMEPERIDINE UR QL CFM: NOT DETECTED
NOROXYCODONE UR QL CFM: NOT DETECTED
NOROXYMORPHONE UR QL SCN: NOT DETECTED
OXAZEPAM UR QL: NOT DETECTED
OXYCODONE UR QL: NOT DETECTED
OXYMORPHONE UR QL: NOT DETECTED
PATHOLOGY STUDY: NORMAL
PCP UR QL: NOT DETECTED
PHENTERMINE UR QL: NOT DETECTED
PREGABALIN: PRESENT
SERVICE CMNT-IMP: NORMAL
TAPENTADOL UR QL SCN: NOT DETECTED
TAPENTADOL UR QL SCN: NOT DETECTED
TEMAZEPAM UR QL: NOT DETECTED
TRAMADOL UR QL: NOT DETECTED
ZOLPIDEM METABOLITE: NOT DETECTED
ZOLPIDEM UR QL: NOT DETECTED

## 2022-02-20 DIAGNOSIS — I10 ESSENTIAL HYPERTENSION: ICD-10-CM

## 2022-02-20 DIAGNOSIS — D69.2 PALPABLE PURPURA: ICD-10-CM

## 2022-02-21 RX ORDER — CLONIDINE HYDROCHLORIDE 0.1 MG/1
0.1 TABLET ORAL 2 TIMES DAILY
Qty: 180 TABLET | Refills: 1 | Status: SHIPPED | OUTPATIENT
Start: 2022-02-21 | End: 2022-05-29 | Stop reason: SDUPTHER

## 2022-02-21 RX ORDER — PROMETHAZINE HYDROCHLORIDE 25 MG/1
25 TABLET ORAL EVERY 6 HOURS PRN
Qty: 30 TABLET | Refills: 0 | Status: SHIPPED | OUTPATIENT
Start: 2022-02-21 | End: 2022-03-24 | Stop reason: SDUPTHER

## 2022-02-21 RX ORDER — TRIAMCINOLONE ACETONIDE 1 MG/G
CREAM TOPICAL
Qty: 454 G | Refills: 0 | Status: SHIPPED | OUTPATIENT
Start: 2022-02-21 | End: 2022-05-29 | Stop reason: SDUPTHER

## 2022-02-21 NOTE — TELEPHONE ENCOUNTER
No new care gaps identified.  Powered by [employer]. Reference number: [id number].   2/20/2022 9:36:53 PM CST

## 2022-02-21 NOTE — TELEPHONE ENCOUNTER
Refilled TAC cream but would like to see her for f/u. Please call to schedule f/u appt-- in person or virtual

## 2022-02-23 DIAGNOSIS — N18.9 CHRONIC KIDNEY DISEASE, UNSPECIFIED CKD STAGE: ICD-10-CM

## 2022-02-28 ENCOUNTER — PES CALL (OUTPATIENT)
Dept: ADMINISTRATIVE | Facility: CLINIC | Age: 77
End: 2022-02-28
Payer: MEDICARE

## 2022-03-02 ENCOUNTER — PATIENT MESSAGE (OUTPATIENT)
Dept: PSYCHIATRY | Facility: CLINIC | Age: 77
End: 2022-03-02
Payer: MEDICARE

## 2022-03-03 ENCOUNTER — DOCUMENTATION ONLY (OUTPATIENT)
Dept: PSYCHIATRY | Facility: CLINIC | Age: 77
End: 2022-03-03
Payer: MEDICARE

## 2022-03-03 ENCOUNTER — OFFICE VISIT (OUTPATIENT)
Dept: PSYCHIATRY | Facility: CLINIC | Age: 77
End: 2022-03-03
Payer: MEDICARE

## 2022-03-03 DIAGNOSIS — M79.2 NEURALGIA AND NEURITIS: ICD-10-CM

## 2022-03-03 DIAGNOSIS — G47.01 INSOMNIA SECONDARY TO CHRONIC PAIN: ICD-10-CM

## 2022-03-03 DIAGNOSIS — E11.40 PAINFUL DIABETIC NEUROPATHY: ICD-10-CM

## 2022-03-03 DIAGNOSIS — G89.29 INSOMNIA SECONDARY TO CHRONIC PAIN: ICD-10-CM

## 2022-03-03 DIAGNOSIS — Z86.59 HISTORY OF DEPRESSION: ICD-10-CM

## 2022-03-03 DIAGNOSIS — Z01.818 PREOPERATIVE EVALUATION TO RULE OUT SURGICAL CONTRAINDICATION: Primary | ICD-10-CM

## 2022-03-03 DIAGNOSIS — G89.4 CHRONIC PAIN SYNDROME: ICD-10-CM

## 2022-03-03 PROCEDURE — 96130 PR PSYCHOLOGIC TEST EVAL SVCS, 1ST HR: ICD-10-PCS | Mod: 95,,, | Performed by: PSYCHOLOGIST

## 2022-03-03 PROCEDURE — 96139 PR PSYCH/NEUROPSYCH TEST ADMIN/SCORING, BY TECH, 2+ TESTS, EA ADDTL 30 MIN: ICD-10-PCS | Mod: 95,,, | Performed by: PSYCHOLOGIST

## 2022-03-03 PROCEDURE — 96131 PSYCL TST EVAL PHYS/QHP EA: CPT | Mod: 95,,, | Performed by: PSYCHOLOGIST

## 2022-03-03 PROCEDURE — 90791 PSYCH DIAGNOSTIC EVALUATION: CPT | Mod: 95,,, | Performed by: PSYCHOLOGIST

## 2022-03-03 PROCEDURE — 96138 PSYCL/NRPSYC TECH 1ST: CPT | Mod: 95,,, | Performed by: PSYCHOLOGIST

## 2022-03-03 PROCEDURE — 96138 PR PSYCH/NEUROPSYCH TEST ADMIN/SCORING, BY TECH, 2+ TESTS, 1ST 30 MIN: ICD-10-PCS | Mod: 95,,, | Performed by: PSYCHOLOGIST

## 2022-03-03 PROCEDURE — 96130 PSYCL TST EVAL PHYS/QHP 1ST: CPT | Mod: 95,,, | Performed by: PSYCHOLOGIST

## 2022-03-03 PROCEDURE — 96131 PR PSYCHOLOGIC TEST EVAL SVCS, EA ADDTL HR: ICD-10-PCS | Mod: 95,,, | Performed by: PSYCHOLOGIST

## 2022-03-03 PROCEDURE — 96139 PSYCL/NRPSYC TST TECH EA: CPT | Mod: 95,,, | Performed by: PSYCHOLOGIST

## 2022-03-03 PROCEDURE — 90791 PR PSYCHIATRIC DIAGNOSTIC EVALUATION: ICD-10-PCS | Mod: 95,,, | Performed by: PSYCHOLOGIST

## 2022-03-03 NOTE — LETTER
March 3, 2022        LAURENT CALDERÓN STAFF             [facility] [address]  [address]  [facility] [address]  Phone: [phone]   Patient: Indira Waters   MR Number: [id number]   YOB: 1945   Date of Visit: 3/3/2022       Dear  Staff:    Thank you for referring Indira Waters to me for evaluation. Below are the relevant portions of my assessment and plan of care.    Please see Psych Testing Note in Epic as needed.  This evaluation revealed NO contraindications to SCS from a psychological perspective.  There are no significant testing risk factors, current psychiatric problems, or major psychosocial stressors that would contraindicate surgery.  Her history of depression is well managed with pharmacotherapy and she is hopeful insomnia will improve with pain reductions.  She has multiple protective factors to help her manage stress, as well as SCS and recovery.  There are no recommendations for psychological intervention at this time.       If you have questions, please do not hesitate to call me.     Sincerely,        Sandra Nieves, PhD

## 2022-03-03 NOTE — PROGRESS NOTES
Virtual Visit  The patient location is: Home ([place])  The chief complaint leading to consultation is: Presurgical psychological evaluation prior to SCS    Visit type: audiovisual    Face to Face time with patient: 60 minutes of total time spent on the encounter, which includes face to face time and non-face to face time preparing to see the patient (eg, review of tests), Obtaining and/or reviewing separately obtained history, Documenting clinical information in the electronic or other health record, Independently interpreting results (not separately reported) and communicating results to the patient/family/caregiver, or Care coordination (not separately reported). Testing times and codes in report.    Each patient to whom he or she provides medical services by telemedicine is:  (1) informed of the relationship between the physician and patient and the respective role of any other health care provider with respect to management of the patient; and (2) notified that he or she may decline to receive medical services by telemedicine and may withdraw from such care at any time.    Notes: N/A        Psychiatry Initial Visit (PhD/LCSW)    NAME: Indira Waters  MRN: [id number]  : 1945    Date:   2022  Site: [facility]   CPT Code: 85215  Site:  [facility]  Clinical status of patient:  Outpatient  Met with:  Patient  Referred by:  Colin Gibbs NP / Holden Pereira M.D.    Chief complaint/reason for encounter:  Psychological Evaluation prior to surgical implantation of a spinal cord stimulator (SCS) to address chronic pain.      Before this evaluation was initiated, the purposes and process of the assessment and the limits of confidentiality were discussed with the patient who expressed understanding of these issues and verbally consented to proceed with the evaluation.    History of present illness:  Ms. Indira Waters is a 76-year-old female referred for Psychological Evaluation  prior to surgical implantation of a spinal cord stimulator (SCS) to address chronic pain.  She has chronic pain due to neuropathy throughout her body.  Her pain has been present since she was in her 60s.  She has tried medications and injections without receiving sufficient relief.  Her activities are greatly limited.  She reports, It affected my life real bad.  I cant do what I used to do.  I cant hardly walk now I used to sew, shop I dont shop anymore go to [employer].    Ms. Waters is now interested in a trial of SCS.  She was unfamiliar with the procedures and SCS (only thing I know is it is supposed to help).  She understood risks of surgery including bleeding, infection, failure of surgery, misplaced hardware, migration of hardware, need for reoperation, etc.  When asked about potential concerns regarding SCS, she indicated no significant concerns.  She only had one question about whether it affects her CHF.  Her expectations regarding SCS include hoping the pain gets better because its so bad.  She is motivated to walk again.  If SCS is ineffective for her, she noted she will continue living her life.  Her daughters will be available to help her during recovery after surgery.    When asked how pain affects her mood, Ms. Waters reported, Ive always been a strong woman, so thats why the pain aggravates me I try not letting pain affect me real bad, but sometimes it does get to me I cry a lot.  Regarding mental health history, she endorsed a history of pharmacotherapy related to depression and insomnia.  She is currently prescribed Zoloft with benefit, but had to discontinue Valium for insomnia due to taking pain medication.  She has never attended psychotherapy or counseling in the past.  Currently, Ms. Waters denied significant psychiatric problems or major psychosocial stressors.  She has good social support, strong whit, and engagement in recreation.  She was pleasant and cooperative in  interview and appeared to be in good spirits.    Relevant medical history:  Diabetic polyneuropathy associated with diabetes mellitus due to underlying condition; Type 2 diabetes mellitus with hyperglycemia, with long-term current use of insulin; Encounter for therapeutic drug monitoring; Chronic pain syndrome; Diabetic polyneuropathy associated with type 2 diabetes mellitus    Pain scales:  Current level of pain:  9/10    Psychiatric Symptoms:  Depression:  She endorsed depressive symptoms related to multiple psychosocial stressors.  She felt depressed while raising her children on her own.  She also felt depressed due to the loss of her son and also due to pain limitations.  She denied current depression.  She described herself as a happy person who enjoys life.   Krystyna/Hypomania:  Denied.  She denied periods of elevated mood or abnormally increased energy or goal-directed activity.  Anxiety:  She endorsed nervousness that does not seem exaggerated or problematic.  She denied experiencing excessive, exaggerated anxiety that was unmanageable.  Based on her reports, her symptoms do not meet full criteria for Generalized Anxiety Disorder.  Thoughts:  Denied delusions, hallucinations.  Suicidal thoughts/behaviors:  Denied.  Self-injury:  Denied.  Sleep:  She has had sleep problems for which she was prescribed Valium.  They will not prescribe Valium to her currently due to pain medication.  She has trouble falling and staying asleep due to pain because she does not take medication during the night.  Cognitive:  She reports having short-term and long-term memory problems.    Current psychosocial stressors:  Moving to another apartment  Report of coping skills:  Spending time with family, Playing phone games, Watching television  Support system:  Daughters, Family  Strengths and liabilities:  Strength: Patient accepts guidance/feedback, Strength: Patient is expressive/articulate., Strength: Patient is motivated for  change., Strength: Patient has positive support network., Strength: Patient has reasonable judgment., Strength: Patient is stable.    Current and past substance use:  Alcohol: She drinks a cocktail or glass of wine every once in a while; denied history of abuse or dependency.   Drugs: Denied current use; denied history of abuse or dependency.  Tobacco: None.   Caffeine: She drinks two cups of coffee each day.    Current Psychiatric Treatment:  Medications:  She is currently prescribed Zoloft 100 mg by Chun Zapata MD.  She started taking it after her son passed away five years ago.  Psychotherapy:  Denied.    Psychiatric History:  Previous diagnosis:  Depression, Anxiety  Previous hospitalizations:  Denied.  History of outpatient treatment:  Denied.  Previous suicide attempt:  Denied.  Family history of psychiatric illness:  None reported.  Access to guns:  None.    Trauma history:  Five years ago her son passed away.    Social history (marriage, employment, etc.):   Ms. Waters was born and raised in [place], [place] by her biological mother and father along with three younger brothers.  She described her childhood as real good I had a good life.  She denied childhood trauma, abuse, and neglect.  She did good in school and earned a high school diploma.  She is currently not working.  She denied  service.  She is not on disability and finances are somewhat of a struggle (I have to pay for a lot of my medication) but improved by her daughters help.  She is .  She has four living children and one  son.  She currently lives with two of her daughters.  Amish is very important to her and she identifies as Buddhist.    Legal history:  She denied history of arrests and convictions.  She denied current involvement in litigation.    Mental Status Exam:  General appearance:  appears stated age, neatly dressed, well groomed  Speech:  normal rate and tone  Level of cooperation:   cooperative  Thought processes:  logical, goal-directed  Mood:  euthymic  Thought content:  no illusions, no visual hallucinations, no auditory hallucinations, no delusions, no active or passive homicidal thoughts, no active or passive suicidal ideation, no obsessions, no compulsions, no violence  Affect:  appropriate  Orientation:  oriented to person, place, and date  Memory:  Recent memory:  2 of 3 objects after brief delay.  (1/1 with categorical cue)  Remote memory - intact  Attention span and concentration:  spelled HOUSE forward and backwards  Fund of general knowledge: 2 of 3 recent presidents  Abstract reasoning:    Similarities: abstract.    Proverbs: abstract.  Judgment and insight: fair  Language:  intact    Diagnostic impressions:    ICD-10-CM ICD-9-CM   1. Preoperative evaluation to rule out surgical contraindication  Z01.818 V72.83   2. Chronic pain syndrome  G89.4 338.4   3. Neuralgia and neuritis  M79.2 729.2   4. Painful diabetic neuropathy  E11.40 250.60     357.2   5. History of depression  Z86.59 V11.8   6. Insomnia secondary to chronic pain  G89.29 338.29    G47.01 327.01       Plan and Recommendations:  Ms. Waters completed psychological testing.  The testing report of this psychological evaluation will follow in the Notes folder in the patients chart in the encounter titled Psychological Testing.  Her test results indicated no significant psychiatric distress that would contraindicate surgery.  She was provided with feedback and recommendations to address unhelpful pain perceptions, to which she was very open and agreeable.    Based on this evaluation, Ms. Waters is a suitable candidate to proceed with spinal cord stimulation (SCS) from a psychological perspective.  Her history of depression is well-managed with psychotropic medication, and she is hopeful that insomnia will improve with reductions in pain.  She has multiple protective factors (good social support, stability, whit, engagement  in recreation) that should help her manage stress and recovery from surgery.  There are no recommendations for psychological intervention at this time.        Length of time:   60 minutes              Sandra Nieves, PhD  Clinical Psychologist

## 2022-03-03 NOTE — PROGRESS NOTES
This evaluation revealed NO contraindications to SCS from a psychological perspective.  There are no significant testing risk factors, current psychiatric problems, or major psychosocial stressors that would contraindicate surgery.  Her history of depression is well managed with pharmacotherapy and she is hopeful insomnia will improve with pain reductions.  She has multiple protective factors to help her manage stress, as well as SCS and recovery.  There are no recommendations for psychological intervention at this time.

## 2022-03-04 DIAGNOSIS — G89.4 CHRONIC PAIN SYNDROME: Primary | ICD-10-CM

## 2022-03-04 DIAGNOSIS — E11.40 PAINFUL DIABETIC NEUROPATHY: ICD-10-CM

## 2022-03-04 DIAGNOSIS — E11.42 DIABETIC POLYNEUROPATHY ASSOCIATED WITH TYPE 2 DIABETES MELLITUS: ICD-10-CM

## 2022-03-09 ENCOUNTER — OFFICE VISIT (OUTPATIENT)
Dept: RHEUMATOLOGY | Facility: CLINIC | Age: 77
End: 2022-03-09
Payer: MEDICARE

## 2022-03-09 VITALS
HEIGHT: 60 IN | WEIGHT: 185.88 LBS | OXYGEN SATURATION: 96 % | SYSTOLIC BLOOD PRESSURE: 165 MMHG | DIASTOLIC BLOOD PRESSURE: 72 MMHG | RESPIRATION RATE: 18 BRPM | HEART RATE: 90 BPM | BODY MASS INDEX: 36.49 KG/M2

## 2022-03-09 DIAGNOSIS — E66.9 CLASS 2 OBESITY WITH BODY MASS INDEX (BMI) OF 36.0 TO 36.9 IN ADULT, UNSPECIFIED OBESITY TYPE, UNSPECIFIED WHETHER SERIOUS COMORBIDITY PRESENT: ICD-10-CM

## 2022-03-09 DIAGNOSIS — G89.4 CHRONIC PAIN SYNDROME: ICD-10-CM

## 2022-03-09 DIAGNOSIS — M31.0 LEUKOCYTOCLASTIC VASCULITIS: ICD-10-CM

## 2022-03-09 DIAGNOSIS — M79.7 FIBROMYALGIA: Primary | ICD-10-CM

## 2022-03-09 DIAGNOSIS — M15.9 PRIMARY OSTEOARTHRITIS INVOLVING MULTIPLE JOINTS: ICD-10-CM

## 2022-03-09 DIAGNOSIS — Z71.89 COUNSELING AND COORDINATION OF CARE: ICD-10-CM

## 2022-03-09 PROCEDURE — 99999 PR PBB SHADOW E&M-EST. PATIENT-LVL III: ICD-10-PCS | Mod: PBBFAC,,, | Performed by: INTERNAL MEDICINE

## 2022-03-09 PROCEDURE — 99215 OFFICE O/P EST HI 40 MIN: CPT | Mod: S$PBB,ICN,, | Performed by: INTERNAL MEDICINE

## 2022-03-09 PROCEDURE — 99999 PR PBB SHADOW E&M-EST. PATIENT-LVL III: CPT | Mod: PBBFAC,,, | Performed by: INTERNAL MEDICINE

## 2022-03-09 PROCEDURE — 99213 OFFICE O/P EST LOW 20 MIN: CPT | Mod: PBBFAC,PN | Performed by: INTERNAL MEDICINE

## 2022-03-09 PROCEDURE — 99215 PR OFFICE/OUTPT VISIT, EST, LEVL V, 40-54 MIN: ICD-10-PCS | Mod: S$PBB,ICN,, | Performed by: INTERNAL MEDICINE

## 2022-03-09 NOTE — PROGRESS NOTES
RHEUMATOLOGY OUTPATIENT CLINIC NOTE    3/9/2022    Attending Rheumatologist: Jayesh Boggs  Primary Care Provider: Chun Zapata MD   Physician Requesting Consultation: Holden Pereira MD  [address] [place] [address]  Chief Complaint/Reason For Consultation:  No chief complaint on file.      Subjective:       HPI  Indira Waters is a 76 y.o. White female with medical history noted below who presents for evaluation of joint pain.     Patient presents for evaluation of joint pain. She reports widespread pain from the top of her head to the bottom of your toes. Patient reports morning stiffness, usually takes her pain pill and it goes away. Reports lower extremity swelling. 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 +fatigue, HA, forgetfulness, anxiety/depresssion, paraesthesias, myalgias/spasms. +Hair thinning, oral sores, rash on arms though as a result of LCV, Hx of Pleural Effusion, easy bruising, Raynaud's, Hx of CVA, miscarriage x2 (1st trimester), chest pain, wt gain. No Photosensitivity, LAD, Raynaud's, GERD, Skin tightening, SOB, fever, wt loss. Of note patient reports Hx of Fibromyalgia and Lupus (not previously treated).     Review of Systems   Constitutional: Positive for fatigue. Negative for chills, fever and unexpected weight change.   HENT: Positive for mouth dryness, mouth sores and trouble swallowing.    Eyes: Positive for eye dryness. Negative for redness.   Respiratory: Negative for cough and shortness of breath.    Cardiovascular: Positive for chest pain.   Gastrointestinal: Negative for abdominal distention, constipation, diarrhea, nausea and vomiting.   Genitourinary: Positive for dysuria. Negative for genital sores and vaginal dryness.   Musculoskeletal: Positive for arthralgias, back pain, leg pain and myalgias. Negative for gait problem, joint swelling, neck pain, neck stiffness and joint  deformity.   Integumentary:  Negative for rash.   Neurological: Positive for numbness and headaches. Negative for weakness.   Hematological: Negative for adenopathy. Bruises/bleeds easily.   Psychiatric/Behavioral: Positive for decreased concentration and sleep disturbance. Negative for confusion. The patient is nervous/anxious.    All other systems reviewed and are negative.       Chronic comorbid conditions affecting medical decision making today:  Past Medical History:   Diagnosis Date    Arthritis     Back pain     Cancer     ovarian    Depression     Diabetes mellitus     Fibromyalgia     Hyperlipidemia     Hypertension     Lupus     Stroke     slight left sided weakness     Past Surgical History:   Procedure Laterality Date    APPENDECTOMY       SECTION      HYSTERECTOMY      INJECTION OF ANESTHETIC AGENT AROUND NERVE Bilateral 2021    Procedure: BLOCK, NERVE, SYMPATHIC;  Surgeon: Holden Pereira MD;  Location: [facility];  Service: Pain Management;  Laterality: Bilateral;    INJECTION OF ANESTHETIC AGENT AROUND NERVE N/A 2021    Procedure: BLOCK, NERVE, SYMPATHETIC  need consent;  Surgeon: Holden Pereira MD;  Location: [facility];  Service: Pain Management;  Laterality: N/A;    VA EVAL,SWALLOW FUNCTION,CINE/VIDEO RECORD  2021         TONSILLECTOMY       Family History   Problem Relation Age of Onset    COPD Mother     Lupus Mother     Hernia Mother     Uterine cancer Mother         vs cervical cancer    Ovarian cancer Mother     Diabetes Father     Coronary artery disease Father     Colon cancer Maternal Grandmother         in her 50's     Social History     Substance and Sexual Activity   Alcohol Use No     Social History     Tobacco Use   Smoking Status Former Smoker    Quit date: 2020    Years since quittin.3   Smokeless Tobacco Never Used     Social History     Substance and Sexual Activity   Drug Use No       Current Outpatient  Medications:     acetaminophen (TYLENOL) 500 MG tablet, Take 2 tablets (1,000 mg total) by mouth every 8 (eight) hours as needed for Pain., Disp: , Rfl: 0    aspirin (ECOTRIN) 81 MG EC tablet, Take 1 tablet (81 mg total) by mouth once daily., Disp: 30 tablet, Rfl: 0    blood sugar diagnostic Strp, 1 each by Misc.(Non-Drug; Combo Route) route 4 (four) times daily., Disp: 200 each, Rfl: 0    blood-glucose meter Misc, Follow package directions (Patient taking differently: Follow package directions), Disp: 1 each, Rfl: 0    blood-glucose meter,continuous (DEXCOM G6 ) Misc, 1 each by Misc.(Non-Drug; Combo Route) route continuous prn., Disp: 1 each, Rfl: PRN    blood-glucose sensor (DEXCOM G6 SENSOR) Nicole, 3 each by Misc.(Non-Drug; Combo Route) route continuous prn. Change every 10 days., Disp: 3 each, Rfl: PRN    blood-glucose transmitter (DEXCOM G6 TRANSMITTER) Nicole, 1 each by Misc.(Non-Drug; Combo Route) route continuous prn., Disp: 1 each, Rfl: PRN    cloNIDine (CATAPRES) 0.1 MG tablet, Take 1 tablet (0.1 mg total) by mouth 2 (two) times daily., Disp: 180 tablet, Rfl: 1    evolocumab (REPATHA SYRINGE) 140 mg/mL Syrg, Inject 140 mLs into the skin every 14 (fourteen) days., Disp: 2 mL, Rfl: 11    furosemide (LASIX) 40 MG tablet, Take 1 tablet (40 mg total) by mouth once daily., Disp: 30 tablet, Rfl: 11    HYDROcodone-acetaminophen (NORCO)  mg per tablet, Take 1 tablet by mouth every 8 (eight) hours as needed for Pain., Disp: 90 tablet, Rfl: 0    insulin aspart U-100 (NOVOLOG) 100 unit/mL (3 mL) InPn pen, Inject 10 Units into the skin 3 (three) times daily with meals. Plus correction scale. Max TDD 40units., Disp: 15 mL, Rfl: 3    insulin detemir U-100 (LEVEMIR FLEXTOUCH U-100 INSULN) 100 unit/mL (3 mL) InPn pen, Inject 14 Units into the skin 2 (two) times daily., Disp: 15 mL, Rfl: 3    ipratropium-albuteroL (COMBIVENT)  mcg/actuation inhaler, Inhale 1 puff into the lungs by mouth every  "6 (six) hours., Disp: 4 g, Rfl: 0    lancets 33 gauge Misc, Use 4 (four) times daily., Disp: 200 each, Rfl: 0    zzvwutngh-U7-sqO95-algal oil (METANX/FOLTANX RF) 3 mg-35 mg-2 mg -90.314 mg Cap, Take 1 capsule by mouth once daily. For neuropathy in feet., Disp: 30 capsule, Rfl: 3    losartan (COZAAR) 50 MG tablet, Take 1 tablet (50 mg total) by mouth once daily., Disp: 90 tablet, Rfl: 0    methocarbamoL (ROBAXIN) 500 MG Tab, Take 1 tablet (500 mg total) by mouth 3 (three) times daily., Disp: 90 tablet, Rfl: 1    methocarbamoL (ROBAXIN) 500 MG Tab, Take 1 tablet (500 mg total) by mouth 3 (three) times daily as needed (muscle spasm)., Disp: 90 tablet, Rfl: 0    NIFEdipine (PROCARDIA-XL) 30 MG (OSM) 24 hr tablet, Take 1 tablet (30 mg total) by mouth 2 (two) times a day., Disp: 180 tablet, Rfl: 0    nitroGLYCERIN (NITROSTAT) 0.4 MG SL tablet, Place 1 tablet (0.4 mg total) under the tongue every 5 (five) minutes as needed for Chest pain., Disp: 25 tablet, Rfl: 0    pantoprazole (PROTONIX) 40 MG tablet, Take 1 tablet (40 mg total) by mouth once daily., Disp: 30 tablet, Rfl: 0    pen needle, diabetic (BD ULTRA-FINE HIEN PEN NEEDLE) 32 gauge x 5/32" Ndle, 1 each by Misc.(Non-Drug; Combo Route) route 4 (four) times daily., Disp: 400 each, Rfl: 2    pregabalin (LYRICA) 150 MG capsule, Take 1 capsule (150 mg total) by mouth 3 (three) times daily., Disp: 90 capsule, Rfl: 2    promethazine (PHENERGAN) 25 MG tablet, Take 1 tablet (25 mg total) by mouth every 6 (six) hours as needed for Nausea., Disp: 30 tablet, Rfl: 0    senna-docusate 8.6-50 mg (PERICOLACE) 8.6-50 mg per tablet, Take 1 tablet by mouth 2 (two) times daily as needed for Constipation., Disp: 60 tablet, Rfl: 0    sertraline (ZOLOFT) 100 MG tablet, Take 1 tablet (100 mg total) by mouth once daily., Disp: 90 tablet, Rfl: 3    triamcinolone acetonide 0.1% (KENALOG) 0.1 % cream, Apply to affected areas of body twice daily as needed rash. Do not use on face, " underarms, or groin., Disp: 454 g, Rfl: 0    TRUEPLUS LANCETS 30 gauge Misc, USE FOR TESTING FOUR TIMES DAILY, Disp: 200 each, Rfl: 2     Objective:         Vitals:    03/09/22 1520   BP: (!) 165/72   Pulse: 90   Resp: 18     Physical Exam   Constitutional:   Uses walker    Musculoskeletal:      Comments: Can make fist, no synovitis   Knee crepitus   Tender Points:  No   Yes  ( )    (x )   Low cervical anterior aspect    ( )    (x )   Costochondral Junction   ( )    (x )   Lateral Epicondyle  ( )    (x )   Suboccipital   ( )    (x )   Trapezius   ( )    (x )   Supraspinatus   ( )    (x )   Gluteal   ( )    (x )   Greater trochanter  ( )    (x )   Knee       Skin:   hyperpigmented spots        Reviewed old and all outside pertinent medical records available.    All lab results personally reviewed and interpreted by me.  Lab Results   Component Value Date    WBC 16.53 (H) 12/21/2021    HGB 10.7 (L) 12/21/2021    HCT 31.0 (L) 12/21/2021    MCV 82 12/21/2021    MCH 28.2 12/21/2021    MCHC 34.5 12/21/2021    RDW 14.2 12/21/2021     12/21/2021    MPV 11.5 12/21/2021    PLTEST Appears normal 10/01/2021       Lab Results   Component Value Date     (L) 12/21/2021    K 4.0 12/21/2021    CL 86 (L) 12/21/2021    CO2 25 12/21/2021     (H) 12/21/2021    BUN 19 12/21/2021    CALCIUM 8.8 12/21/2021    PROT 6.9 10/21/2021    ALBUMIN 2.9 (L) 10/21/2021    BILITOT 0.6 10/21/2021    AST 13 10/21/2021    ALKPHOS 100 10/21/2021    ALT 7 (L) 10/21/2021       Lab Results   Component Value Date    COLORU Yellow 10/01/2021    APPEARANCEUA Clear 10/01/2021    SPECGRAV <=1.005 (A) 10/01/2021    PHUR 6.0 10/01/2021    PROTEINUA Trace (A) 10/01/2021    KETONESU Negative 10/01/2021    LEUKOCYTESUR Negative 10/01/2021    NITRITE Negative 10/01/2021    UROBILINOGEN Negative 10/01/2021       Lab Results   Component Value Date    CRP 16.7 (H) 09/20/2021       Lab Results   Component Value Date    SEDRATE 79 (H) 09/20/2021        Lab Results   Component Value Date    SEDRATE 79 (H) 09/20/2021       No components found for: 25OHVITDTOT, 76XSJYXU2, 21XOLJAZ8, METHODNOTE    No results found for: URICACID    No components found for: TSPOTTB        Imaging:  All imaging reviewed and independently interpreted by me.         ASSESSMENT / PLAN:     Indira Waters is a 76 y.o. White female with:      1. Fibromyalgia with Chronic Pain Syndrome   - I believe patients main complaints are due to overlap of fibromyalgia/chronic pain syndrome/osteoarthritis  - discussed diagnosis and management   - she is on Lyrica, Robaxin, Norco  - can consider adding Cymbalta/Savella   - continue following with Pain  - sleep hygiene and stress management discussed  - PT   - reassurance and exercise    - C-Reactive Protein; Future  - STEVE Screen w/Reflex; Future  - Rheumatoid Factor; Future  - Sjogrens syndrome-B extractable nuclear antibody; Future  - CBC Auto Differential; Future  - Comprehensive Metabolic Panel; Future  - Cyclic Citrullinated Peptide Antibody, IgG; Future  - Protein/Creatinine Ratio, Urine; Future  - C4 Complement; Future  - C3 Complement; Future  - Anti-Histone Antibody; Future  - Sedimentation rate; Future  - Urinalysis; Future  - Hepatitis B Surface Ab, Qualitative; Future  - Vitamin D; Future  - TSH; Future  - Uric Acid; Future  - Hepatitis C Antibody; Future  - Hepatitis B Surface Antigen; Future  - CK; Future  - Anti-Scleroderma Antibody; Future  - Quantiferon Gold TB; Future  - HIV 1/2 Ag/Ab (4th Gen); Future  - IgG 1, 2, 3, and 4; Future  - Immunoglobulins (IgG, IgA, IgM) Quantitative; Future  - Protein Electrophoresis, Serum; Future  - Immunofixation Electrophoresis; Future  - DRVVT; Future  - Cardiolipin antibody; Future  - Beta-2 Glycoprotein Abs (IgA, IgG, IgM); Future  - Anti-Neutrophilic Cytoplasmic Antibody; Future  - Cryoglobulin; Future  - Ambulatory referral/consult to Physical/Occupational Therapy; Future    2. Primary  osteoarthritis involving multiple joints  - I believe her current complaints are due to overlap of CPS/OA/FM, which will make her pain goals harder to achieve   - discussed diagnosis and management  - PT referral  - pain meds per Pain team   - wt loss  - reassurance and exercise   - Ambulatory referral/consult to [facility]; Future    3. Leukocytoclastic vasculitis  - patient with hx of LCV with +IgA    - discussed diagnosis and association with CTD   - it is possible this was a drug induced reaction   - will check labs as above   - reassurance     4. Other specified counseling  - over 10 minutes spent regarding below topics:  - Immunization counseling done.  - Weight loss counseling done.  - Nutrition and exercise counseling.  - Limitation of alcohol consumption.  - Regular exercise:  Aerobic and resistance.  - Medication counseling provided.    5. Obesity  - would benefit from decreasing at least 10% of body weight.  - recommended goal of losing 1 lb per week.  - consider nutritionist evaluation.      Follow up in about 4 weeks (around 4/6/2022).    Method of contact with patient concerns: [facility]    Disclaimer:  This note is prepared using voice recognition software and as such is likely to have errors and has not been proof read. Please contact me for questions.     Time spent: 45 minutes in face to face discussion concerning diagnosis, prognosis, review of lab and test results, benefits of treatment as well as management of disease, counseling of patient and coordination of care between various health care providers.  Greater than half the time spent was used for coordination of care and counseling of patient.    Jayesh Boggs M.D.  Rheumatology Department   [facility]

## 2022-03-10 RX ORDER — HYDROCODONE BITARTRATE AND ACETAMINOPHEN 10; 325 MG/1; MG/1
1 TABLET ORAL EVERY 8 HOURS PRN
Qty: 90 TABLET | Refills: 0 | Status: SHIPPED | OUTPATIENT
Start: 2022-03-10 | End: 2022-04-14 | Stop reason: SDUPTHER

## 2022-03-24 RX ORDER — PROMETHAZINE HYDROCHLORIDE 25 MG/1
25 TABLET ORAL EVERY 6 HOURS PRN
Qty: 30 TABLET | Refills: 0 | Status: SHIPPED | OUTPATIENT
Start: 2022-03-24 | End: 2022-04-14 | Stop reason: SDUPTHER

## 2022-03-24 NOTE — TELEPHONE ENCOUNTER
This Rx Request does not qualify for assessment with the OR   Please review protocol details and the Care Due Message for extra clinical information    Reasons Rx Request may be deferred:  Medication is non-delegated    Note composed:4:21 PM 03/24/2022

## 2022-04-06 ENCOUNTER — OFFICE VISIT (OUTPATIENT)
Dept: RHEUMATOLOGY | Facility: CLINIC | Age: 77
End: 2022-04-06
Payer: MEDICARE

## 2022-04-06 DIAGNOSIS — M79.7 FIBROMYALGIA: Primary | ICD-10-CM

## 2022-04-06 DIAGNOSIS — E66.9 CLASS 2 OBESITY WITH BODY MASS INDEX (BMI) OF 36.0 TO 36.9 IN ADULT, UNSPECIFIED OBESITY TYPE, UNSPECIFIED WHETHER SERIOUS COMORBIDITY PRESENT: ICD-10-CM

## 2022-04-06 DIAGNOSIS — M31.0 LEUKOCYTOCLASTIC VASCULITIS: ICD-10-CM

## 2022-04-06 DIAGNOSIS — M15.9 PRIMARY OSTEOARTHRITIS INVOLVING MULTIPLE JOINTS: ICD-10-CM

## 2022-04-06 DIAGNOSIS — Z71.89 COUNSELING AND COORDINATION OF CARE: ICD-10-CM

## 2022-04-06 PROCEDURE — 99214 OFFICE O/P EST MOD 30 MIN: CPT | Mod: 95,,, | Performed by: INTERNAL MEDICINE

## 2022-04-06 PROCEDURE — 99214 PR OFFICE/OUTPT VISIT, EST, LEVL IV, 30-39 MIN: ICD-10-PCS | Mod: 95,,, | Performed by: INTERNAL MEDICINE

## 2022-04-06 NOTE — PROGRESS NOTES
The patient location is: Home  The chief complaint leading to consultation is: Follow up   Visit type: Virtual visit with synchronous audio and video  Total time spent with patient: 15 minutes     Each patient to whom he or she provides medical services by telemedicine is:  (1) informed of the relationship between the physician and patient and the respective role of any other health care provider with respect to management of the patient; and (2) notified that he or she may decline to receive medical services by telemedicine and may withdraw from such care at any time.         RHEUMATOLOGY OUTPATIENT CLINIC NOTE    4/6/2022    Attending Rheumatologist: Jayesh Boggs  Primary Care Provider: Chun Zapata MD   Physician Requesting Consultation: No referring provider defined for this encounter.  Chief Complaint/Reason For Consultation:  No chief complaint on file.      Subjective:       HPI  Indira Waters is a 77 y.o. White female with medical history noted below who presents for evaluation of joint pain.   Patient presents for evaluation of joint pain. She reports widespread pain from the top of her head to the bottom of your toes. Patient reports morning stiffness, usually takes her pain pill and it goes away. Reports lower extremity swelling. 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 +fatigue, HA, forgetfulness, anxiety/depresssion, paraesthesias, myalgias/spasms. +Hair thinning, oral sores, rash on arms though as a result of LCV, Hx of Pleural Effusion, easy bruising, Raynaud's, Hx of CVA, miscarriage x2 (1st trimester), chest pain, wt gain. No Photosensitivity, LAD, Raynaud's, GERD, Skin tightening, SOB, fever, wt loss. Of note patient reports Hx of Fibromyalgia and Lupus (not previously treated).     Today  Patient here for follow up.   Last visit presented for evaluation of joint pain. Extensive work up ordered, patient never got  the work up. She notes since last visit a fall due to her slippers, reports eeing her PCP after this.     Review of Systems   Constitutional: Positive for fatigue. Negative for chills, fever and unexpected weight change.   HENT: Positive for mouth dryness, mouth sores and trouble swallowing.    Eyes: Positive for eye dryness. Negative for redness.   Respiratory: Negative for cough and shortness of breath.    Cardiovascular: Positive for chest pain.   Gastrointestinal: Negative for abdominal distention, constipation, diarrhea, nausea and vomiting.   Genitourinary: Positive for dysuria. Negative for genital sores and vaginal dryness.   Musculoskeletal: Positive for arthralgias, back pain, leg pain and myalgias. Negative for gait problem, joint swelling, neck pain, neck stiffness and joint deformity.   Integumentary:  Negative for rash.   Neurological: Positive for numbness and headaches. Negative for weakness.   Hematological: Negative for adenopathy. Bruises/bleeds easily.   Psychiatric/Behavioral: Positive for decreased concentration and sleep disturbance. Negative for confusion. The patient is nervous/anxious.    All other systems reviewed and are negative.       Chronic comorbid conditions affecting medical decision making today:  Past Medical History:   Diagnosis Date    Arthritis     Back pain     Cancer     ovarian    Depression     Diabetes mellitus     Fibromyalgia     Hyperlipidemia     Hypertension     Lupus     Stroke     slight left sided weakness     Past Surgical History:   Procedure Laterality Date    APPENDECTOMY       SECTION      HYSTERECTOMY      INJECTION OF ANESTHETIC AGENT AROUND NERVE Bilateral 2021    Procedure: BLOCK, NERVE, SYMPATHIC;  Surgeon: Holden Pereira MD;  [facility]: [facility] PAIN [facility]GT;  Service: Pain Management;  Laterality: Bilateral;    INJECTION OF ANESTHETIC AGENT AROUND NERVE N/A 2021    Procedure: BLOCK, NERVE, SYMPATHETIC  need consent;  Surgeon:  Holden Pereira MD;  Location: [facility] PAIN MGT;  Service: Pain Management;  Laterality: N/A;    NH EVAL,SWALLOW FUNCTION,CINE/VIDEO RECORD  2021         TONSILLECTOMY       Family History   Problem Relation Age of Onset    COPD Mother     Lupus Mother     Hernia Mother     Uterine cancer Mother         vs cervical cancer    Ovarian cancer Mother     Diabetes Father     Coronary artery disease Father     Colon cancer Maternal Grandmother         in her 50's     Social History     Substance and Sexual Activity   Alcohol Use No     Social History     Tobacco Use   Smoking Status Former Smoker    Quit date: 2020    Years since quittin.4   Smokeless Tobacco Never Used     Social History     Substance and Sexual Activity   Drug Use No       Current Outpatient Medications:     acetaminophen (TYLENOL) 500 MG tablet, Take 2 tablets (1,000 mg total) by mouth every 8 (eight) hours as needed for Pain., Disp: , Rfl: 0    aspirin (ECOTRIN) 81 MG EC tablet, Take 1 tablet (81 mg total) by mouth once daily., Disp: 30 tablet, Rfl: 0    blood sugar diagnostic Strp, 1 each by Misc.(Non-Drug; Combo Route) route 4 (four) times daily., Disp: 200 each, Rfl: 0    blood-glucose meter Misc, Follow package directions (Patient taking differently: Follow package directions), Disp: 1 each, Rfl: 0    blood-glucose meter,continuous (DEXCOM G6 ) Misc, 1 each by Misc.(Non-Drug; Combo Route) route continuous prn., Disp: 1 each, Rfl: PRN    blood-glucose sensor (DEXCOM G6 SENSOR) Nicole, 3 each by Misc.(Non-Drug; Combo Route) route continuous prn. Change every 10 days., Disp: 3 each, Rfl: PRN    blood-glucose transmitter (DEXCOM G6 TRANSMITTER) Nicole, 1 each by Misc.(Non-Drug; Combo Route) route continuous prn., Disp: 1 each, Rfl: PRN    cloNIDine (CATAPRES) 0.1 MG tablet, Take 1 tablet (0.1 mg total) by mouth 2 (two) times daily., Disp: 180 tablet, Rfl: 1    evolocumab (REPATHA SYRINGE) 140 mg/mL Syrg, Inject 140  "mLs into the skin every 14 (fourteen) days., Disp: 2 mL, Rfl: 11    furosemide (LASIX) 40 MG tablet, Take 1 tablet (40 mg total) by mouth once daily., Disp: 30 tablet, Rfl: 11    HYDROcodone-acetaminophen (NORCO)  mg per tablet, Take 1 tablet by mouth every 8 (eight) hours as needed for Pain., Disp: 90 tablet, Rfl: 0    insulin aspart U-100 (NOVOLOG) 100 unit/mL (3 mL) InPn pen, Inject 10 Units into the skin 3 (three) times daily with meals. Plus correction scale. Max TDD 40units., Disp: 15 mL, Rfl: 3    insulin detemir U-100 (LEVEMIR FLEXTOUCH U-100 INSULN) 100 unit/mL (3 mL) InPn pen, Inject 14 Units into the skin 2 (two) times daily., Disp: 15 mL, Rfl: 3    ipratropium-albuteroL (COMBIVENT)  mcg/actuation inhaler, Inhale 1 puff into the lungs by mouth every 6 (six) hours., Disp: 4 g, Rfl: 0    lancets 33 gauge Misc, Use 4 (four) times daily., Disp: 200 each, Rfl: 0    [id number]-algal oil (METANX/FOLTANX RF) 3 mg-35 mg-2 mg -90.314 mg Cap, Take 1 capsule by mouth once daily. For neuropathy in feet., Disp: 30 capsule, Rfl: 3    losartan (COZAAR) 50 MG tablet, Take 1 tablet (50 mg total) by mouth once daily., Disp: 90 tablet, Rfl: 0    methocarbamoL (ROBAXIN) 500 MG Tab, Take 1 tablet (500 mg total) by mouth 3 (three) times daily as needed (muscle spasm)., Disp: 90 tablet, Rfl: 0    NIFEdipine (PROCARDIA-XL) 30 MG (OSM) 24 hr tablet, Take 1 tablet (30 mg total) by mouth 2 (two) times a day., Disp: 180 tablet, Rfl: 0    nitroGLYCERIN (NITROSTAT) 0.4 MG SL tablet, Place 1 tablet (0.4 mg total) under the tongue every 5 (five) minutes as needed for Chest pain., Disp: 25 tablet, Rfl: 0    pantoprazole (PROTONIX) 40 MG tablet, Take 1 tablet (40 mg total) by mouth once daily., Disp: 30 tablet, Rfl: 0    pen needle, diabetic (BD ULTRA-FINE HIEN PEN NEEDLE) 32 gauge x 5/32" Ndle, 1 each by Misc.(Non-Drug; Combo Route) route 4 (four) times daily., Disp: 400 each, Rfl: 2    pregabalin " (LYRICA) 150 MG capsule, Take 1 capsule (150 mg total) by mouth 3 (three) times daily., Disp: 90 capsule, Rfl: 2    promethazine (PHENERGAN) 25 MG tablet, Take 1 tablet (25 mg total) by mouth every 6 (six) hours as needed for Nausea., Disp: 30 tablet, Rfl: 0    senna-docusate 8.6-50 mg (PERICOLACE) 8.6-50 mg per tablet, Take 1 tablet by mouth 2 (two) times daily as needed for Constipation., Disp: 60 tablet, Rfl: 0    sertraline (ZOLOFT) 100 MG tablet, Take 1 tablet (100 mg total) by mouth once daily., Disp: 90 tablet, Rfl: 3    triamcinolone acetonide 0.1% (KENALOG) 0.1 % cream, Apply to affected areas of body twice daily as needed rash. Do not use on face, underarms, or groin., Disp: 454 g, Rfl: 0    TRUEPLUS LANCETS 30 gauge Misc, USE FOR TESTING FOUR TIMES DAILY, Disp: 200 each, Rfl: 2     Objective:         There were no vitals filed for this visit.  Physical Exam  Virtual visit   Reviewed old and all outside pertinent medical records available.    All lab results personally reviewed and interpreted by me.  Lab Results   Component Value Date    WBC 16.53 (H) 12/21/2021    HGB 10.7 (L) 12/21/2021    HCT 31.0 (L) 12/21/2021    MCV 82 12/21/2021    MCH 28.2 12/21/2021    MCHC 34.5 12/21/2021    RDW 14.2 12/21/2021     12/21/2021    MPV 11.5 12/21/2021    PLTEST Appears normal 10/01/2021       Lab Results   Component Value Date     (L) 12/21/2021    K 4.0 12/21/2021    CL 86 (L) 12/21/2021    CO2 25 12/21/2021     (H) 12/21/2021    BUN 19 12/21/2021    CALCIUM 8.8 12/21/2021    PROT 6.9 10/21/2021    ALBUMIN 2.9 (L) 10/21/2021    BILITOT 0.6 10/21/2021    AST 13 10/21/2021    ALKPHOS 100 10/21/2021    ALT 7 (L) 10/21/2021       Lab Results   Component Value Date    COLORU Yellow 10/01/2021    APPEARANCEUA Clear 10/01/2021    SPECGRAV <=1.005 (A) 10/01/2021    PHUR 6.0 10/01/2021    PROTEINUA Trace (A) 10/01/2021    KETONESU Negative 10/01/2021    LEUKOCYTESUR Negative 10/01/2021    NITRITE  Negative 10/01/2021    UROBILINOGEN Negative 10/01/2021       Lab Results   Component Value Date    CRP 16.7 (H) 09/20/2021       Lab Results   Component Value Date    SEDRATE 79 (H) 09/20/2021       Lab Results   Component Value Date    SEDRATE 79 (H) 09/20/2021       No components found for: 25OHVITDTOT, 18QKCQEP8, 06YLJKUC4, METHODNOTE    No results found for: URICACID    No components found for: TSPOTTB        Imaging:  All imaging reviewed and independently interpreted by me.         ASSESSMENT / PLAN:     Indira Waters is a 77 y.o. White female with:      1. Fibromyalgia with Chronic Pain Syndrome   - I believe patients main complaints are due to overlap of fibromyalgia/chronic pain syndrome/osteoarthritis  - she is on Lyrica, Robaxin, Norco  - can consider adding Cymbalta/Savella   - continue following with Pain  - sleep hygiene and stress management discussed  - PT   - reassurance and exercise      2. Primary osteoarthritis involving multiple joints  - I believe her current complaints are due to overlap of CPS/OA/FM, which will make her pain goals harder to achieve   - stable   - advised to follow up with PT as this will help with her from not falling the future   - PT referral  - pain meds per Pain team   - wt loss  - reassurance and exercise   - Ambulatory referral/consult to [facility]; [facility]    3. Leukocytoclastic vasculitis  - patient with hx of LCV with +IgA    - it is possible this was a drug induced reaction   - will get blood work from last visit    - reassurance     4. Other specified counseling  - over 10 minutes spent regarding below topics:  - Immunization counseling done.  - Weight loss counseling done.  - Nutrition and exercise counseling.  - Limitation of alcohol consumption.  - Regular exercise:  Aerobic and resistance.  - Medication counseling provided.    5. Obesity  - would benefit from decreasing at least 10% of body weight.  - recommended goal of losing 1 lb per  week.  - consider nutritionist evaluation.      Follow up in about 6 weeks (around 5/18/2022).    Method of contact with patient concerns: [facility]    Disclaimer:  This note is prepared using voice recognition software and as such is likely to have errors and has not been proof read. Please contact me for questions.     Time spent: 30 minutes in face to face discussion concerning diagnosis, prognosis, review of lab and test results, benefits of treatment as well as management of disease, counseling of patient and coordination of care between various health care providers.  Greater than half the time spent was used for coordination of care and counseling of patient.    Jayesh Boggs M.D.  Rheumatology Department   [facility]

## 2022-04-13 ENCOUNTER — PATIENT OUTREACH (OUTPATIENT)
Dept: ADMINISTRATIVE | Facility: OTHER | Age: 77
End: 2022-04-13
Payer: MEDICARE

## 2022-04-13 ENCOUNTER — TELEPHONE (OUTPATIENT)
Dept: PAIN MEDICINE | Facility: CLINIC | Age: 77
End: 2022-04-13
Payer: MEDICARE

## 2022-04-13 DIAGNOSIS — E11.9 TYPE 2 DIABETES MELLITUS WITHOUT COMPLICATION, UNSPECIFIED WHETHER LONG TERM INSULIN USE: Primary | ICD-10-CM

## 2022-04-13 NOTE — TELEPHONE ENCOUNTER
Staff tried lvm in regards to appt on 4/14/22 @ 9:30  Am with  on the 9th floor [address] ,but there was no answer and mailbox wasn't set up.

## 2022-04-13 NOTE — PROGRESS NOTES
Care Everywhere: updated  Immunization: no profile in links   Health Maintenance: updated  Media Review:   Legacy Review:   DIS:  Order placed: hemoglobin   Upcoming appts:hemoglobin 4.14  EFAX:  Task Tickets:  Referrals:

## 2022-04-14 ENCOUNTER — TELEPHONE (OUTPATIENT)
Dept: PAIN MEDICINE | Facility: CLINIC | Age: 77
End: 2022-04-14
Payer: MEDICARE

## 2022-04-14 ENCOUNTER — OFFICE VISIT (OUTPATIENT)
Dept: PAIN MEDICINE | Facility: CLINIC | Age: 77
End: 2022-04-14
Attending: ANESTHESIOLOGY
Payer: MEDICARE

## 2022-04-14 ENCOUNTER — TELEPHONE (OUTPATIENT)
Dept: INTERNAL MEDICINE | Facility: CLINIC | Age: 77
End: 2022-04-14
Payer: MEDICARE

## 2022-04-14 ENCOUNTER — LAB VISIT (OUTPATIENT)
Dept: LAB | Facility: OTHER | Age: 77
End: 2022-04-14
Payer: MEDICARE

## 2022-04-14 VITALS
SYSTOLIC BLOOD PRESSURE: 140 MMHG | RESPIRATION RATE: 18 BRPM | TEMPERATURE: 98 F | WEIGHT: 185.19 LBS | HEIGHT: 60 IN | HEART RATE: 96 BPM | DIASTOLIC BLOOD PRESSURE: 71 MMHG | BODY MASS INDEX: 36.36 KG/M2 | OXYGEN SATURATION: 98 %

## 2022-04-14 DIAGNOSIS — E11.40 PAINFUL DIABETIC NEUROPATHY: ICD-10-CM

## 2022-04-14 DIAGNOSIS — E11.42 DIABETIC POLYNEUROPATHY ASSOCIATED WITH TYPE 2 DIABETES MELLITUS: ICD-10-CM

## 2022-04-14 DIAGNOSIS — M79.7 FIBROMYALGIA: ICD-10-CM

## 2022-04-14 DIAGNOSIS — E08.42 DIABETIC POLYNEUROPATHY ASSOCIATED WITH DIABETES MELLITUS DUE TO UNDERLYING CONDITION: Primary | ICD-10-CM

## 2022-04-14 DIAGNOSIS — Z79.4 TYPE 2 DIABETES MELLITUS WITH HYPERGLYCEMIA, WITH LONG-TERM CURRENT USE OF INSULIN: ICD-10-CM

## 2022-04-14 DIAGNOSIS — E11.65 TYPE 2 DIABETES MELLITUS WITH HYPERGLYCEMIA, WITH LONG-TERM CURRENT USE OF INSULIN: ICD-10-CM

## 2022-04-14 DIAGNOSIS — G89.4 CHRONIC PAIN DISORDER: ICD-10-CM

## 2022-04-14 DIAGNOSIS — Z51.81 ENCOUNTER FOR THERAPEUTIC DRUG MONITORING: ICD-10-CM

## 2022-04-14 DIAGNOSIS — E11.9 TYPE 2 DIABETES MELLITUS WITHOUT COMPLICATION, UNSPECIFIED WHETHER LONG TERM INSULIN USE: ICD-10-CM

## 2022-04-14 LAB
25(OH)D3+25(OH)D2 SERPL-MCNC: 34 NG/ML (ref 30–96)
ALBUMIN SERPL BCP-MCNC: 3.9 G/DL (ref 3.5–5.2)
ALP SERPL-CCNC: 110 U/L (ref 55–135)
ALT SERPL W/O P-5'-P-CCNC: 8 U/L (ref 10–44)
ANION GAP SERPL CALC-SCNC: 16 MMOL/L (ref 8–16)
AST SERPL-CCNC: 12 U/L (ref 10–40)
BASOPHILS # BLD AUTO: 0.08 K/UL (ref 0–0.2)
BASOPHILS NFR BLD: 0.6 % (ref 0–1.9)
BILIRUB SERPL-MCNC: 0.2 MG/DL (ref 0.1–1)
BUN SERPL-MCNC: 29 MG/DL (ref 8–23)
C3 SERPL-MCNC: 162 MG/DL (ref 50–180)
C4 SERPL-MCNC: 45 MG/DL (ref 11–44)
CALCIUM SERPL-MCNC: 9.6 MG/DL (ref 8.7–10.5)
CCP AB SER IA-ACNC: <0.5 U/ML
CHLORIDE SERPL-SCNC: 99 MMOL/L (ref 95–110)
CK SERPL-CCNC: 40 U/L (ref 20–180)
CO2 SERPL-SCNC: 21 MMOL/L (ref 23–29)
CREAT SERPL-MCNC: 1.6 MG/DL (ref 0.5–1.4)
CRP SERPL-MCNC: 9 MG/L (ref 0–8.2)
DIFFERENTIAL METHOD: ABNORMAL
EOSINOPHIL # BLD AUTO: 0.6 K/UL (ref 0–0.5)
EOSINOPHIL NFR BLD: 3.9 % (ref 0–8)
ERYTHROCYTE [DISTWIDTH] IN BLOOD BY AUTOMATED COUNT: 14 % (ref 11.5–14.5)
ERYTHROCYTE [SEDIMENTATION RATE] IN BLOOD: 55 MM/HR (ref 0–20)
EST. GFR  (AFRICAN AMERICAN): 36 ML/MIN/1.73 M^2
EST. GFR  (NON AFRICAN AMERICAN): 31 ML/MIN/1.73 M^2
ESTIMATED AVG GLUCOSE: 286 MG/DL (ref 68–131)
GLUCOSE SERPL-MCNC: 523 MG/DL (ref 70–110)
HBA1C MFR BLD: 11.6 % (ref 4–5.6)
HCT VFR BLD AUTO: 36.7 % (ref 37–48.5)
HGB BLD-MCNC: 12.1 G/DL (ref 12–16)
IGA SERPL-MCNC: 443 MG/DL (ref 40–350)
IGG SERPL-MCNC: 997 MG/DL (ref 650–1600)
IGM SERPL-MCNC: 116 MG/DL (ref 50–300)
IMM GRANULOCYTES # BLD AUTO: 0.05 K/UL (ref 0–0.04)
IMM GRANULOCYTES NFR BLD AUTO: 0.3 % (ref 0–0.5)
LYMPHOCYTES # BLD AUTO: 4.2 K/UL (ref 1–4.8)
LYMPHOCYTES NFR BLD: 29 % (ref 18–48)
MCH RBC QN AUTO: 28.7 PG (ref 27–31)
MCHC RBC AUTO-ENTMCNC: 33 G/DL (ref 32–36)
MCV RBC AUTO: 87 FL (ref 82–98)
MONOCYTES # BLD AUTO: 0.8 K/UL (ref 0.3–1)
MONOCYTES NFR BLD: 5.8 % (ref 4–15)
NEUTROPHILS # BLD AUTO: 8.7 K/UL (ref 1.8–7.7)
NEUTROPHILS NFR BLD: 60.4 % (ref 38–73)
NRBC BLD-RTO: 0 /100 WBC
PLATELET # BLD AUTO: 322 K/UL (ref 150–450)
PMV BLD AUTO: 11.5 FL (ref 9.2–12.9)
POTASSIUM SERPL-SCNC: 4.2 MMOL/L (ref 3.5–5.1)
PROT SERPL-MCNC: 7.9 G/DL (ref 6–8.4)
RBC # BLD AUTO: 4.21 M/UL (ref 4–5.4)
RHEUMATOID FACT SERPL-ACNC: <13 IU/ML (ref 0–15)
SODIUM SERPL-SCNC: 136 MMOL/L (ref 136–145)
TSH SERPL DL<=0.005 MIU/L-ACNC: 2.08 UIU/ML (ref 0.4–4)
URATE SERPL-MCNC: 6.4 MG/DL (ref 2.4–5.7)
WBC # BLD AUTO: 14.42 K/UL (ref 3.9–12.7)

## 2022-04-14 PROCEDURE — 99215 OFFICE O/P EST HI 40 MIN: CPT | Mod: PBBFAC | Performed by: ANESTHESIOLOGY

## 2022-04-14 PROCEDURE — 86334 PATHOLOGIST INTERPRETATION IFE: ICD-10-PCS | Mod: 26,,, | Performed by: PATHOLOGY

## 2022-04-14 PROCEDURE — 82595 ASSAY OF CRYOGLOBULIN: CPT | Performed by: INTERNAL MEDICINE

## 2022-04-14 PROCEDURE — 84165 PATHOLOGIST INTERPRETATION SPE: ICD-10-PCS | Mod: 26,,, | Performed by: PATHOLOGY

## 2022-04-14 PROCEDURE — 99999 PR PBB SHADOW E&M-EST. PATIENT-LVL V: ICD-10-PCS | Mod: PBBFAC,,, | Performed by: ANESTHESIOLOGY

## 2022-04-14 PROCEDURE — 86235 NUCLEAR ANTIGEN ANTIBODY: CPT | Performed by: INTERNAL MEDICINE

## 2022-04-14 PROCEDURE — 82550 ASSAY OF CK (CPK): CPT | Performed by: INTERNAL MEDICINE

## 2022-04-14 PROCEDURE — 99214 PR OFFICE/OUTPT VISIT, EST, LEVL IV, 30-39 MIN: ICD-10-PCS | Mod: S$PBB,GC,, | Performed by: ANESTHESIOLOGY

## 2022-04-14 PROCEDURE — 86160 COMPLEMENT ANTIGEN: CPT | Mod: 59 | Performed by: INTERNAL MEDICINE

## 2022-04-14 PROCEDURE — 87389 HIV-1 AG W/HIV-1&-2 AB AG IA: CPT | Performed by: INTERNAL MEDICINE

## 2022-04-14 PROCEDURE — 87340 HEPATITIS B SURFACE AG IA: CPT | Performed by: INTERNAL MEDICINE

## 2022-04-14 PROCEDURE — 86334 IMMUNOFIX E-PHORESIS SERUM: CPT | Mod: 26,,, | Performed by: PATHOLOGY

## 2022-04-14 PROCEDURE — 80053 COMPREHEN METABOLIC PANEL: CPT | Performed by: INTERNAL MEDICINE

## 2022-04-14 PROCEDURE — 86431 RHEUMATOID FACTOR QUANT: CPT | Performed by: INTERNAL MEDICINE

## 2022-04-14 PROCEDURE — 86038 ANTINUCLEAR ANTIBODIES: CPT | Performed by: INTERNAL MEDICINE

## 2022-04-14 PROCEDURE — 84165 PROTEIN E-PHORESIS SERUM: CPT | Performed by: INTERNAL MEDICINE

## 2022-04-14 PROCEDURE — 86255 FLUORESCENT ANTIBODY SCREEN: CPT | Performed by: INTERNAL MEDICINE

## 2022-04-14 PROCEDURE — 82306 VITAMIN D 25 HYDROXY: CPT | Performed by: INTERNAL MEDICINE

## 2022-04-14 PROCEDURE — 86140 C-REACTIVE PROTEIN: CPT | Performed by: INTERNAL MEDICINE

## 2022-04-14 PROCEDURE — 86160 COMPLEMENT ANTIGEN: CPT | Performed by: INTERNAL MEDICINE

## 2022-04-14 PROCEDURE — 83516 IMMUNOASSAY NONANTIBODY: CPT | Performed by: INTERNAL MEDICINE

## 2022-04-14 PROCEDURE — 86200 CCP ANTIBODY: CPT | Performed by: INTERNAL MEDICINE

## 2022-04-14 PROCEDURE — 84165 PROTEIN E-PHORESIS SERUM: CPT | Mod: 26,,, | Performed by: PATHOLOGY

## 2022-04-14 PROCEDURE — 82784 ASSAY IGA/IGD/IGG/IGM EACH: CPT | Performed by: INTERNAL MEDICINE

## 2022-04-14 PROCEDURE — 86706 HEP B SURFACE ANTIBODY: CPT | Performed by: INTERNAL MEDICINE

## 2022-04-14 PROCEDURE — 86147 CARDIOLIPIN ANTIBODY EA IG: CPT | Mod: 59 | Performed by: INTERNAL MEDICINE

## 2022-04-14 PROCEDURE — 85025 COMPLETE CBC W/AUTO DIFF WBC: CPT | Performed by: INTERNAL MEDICINE

## 2022-04-14 PROCEDURE — 86334 IMMUNOFIX E-PHORESIS SERUM: CPT | Performed by: INTERNAL MEDICINE

## 2022-04-14 PROCEDURE — 86803 HEPATITIS C AB TEST: CPT | Performed by: INTERNAL MEDICINE

## 2022-04-14 PROCEDURE — 85730 THROMBOPLASTIN TIME PARTIAL: CPT | Performed by: INTERNAL MEDICINE

## 2022-04-14 PROCEDURE — 85651 RBC SED RATE NONAUTOMATED: CPT | Performed by: INTERNAL MEDICINE

## 2022-04-14 PROCEDURE — 84443 ASSAY THYROID STIM HORMONE: CPT | Performed by: INTERNAL MEDICINE

## 2022-04-14 PROCEDURE — 86235 NUCLEAR ANTIGEN ANTIBODY: CPT | Mod: 59 | Performed by: INTERNAL MEDICINE

## 2022-04-14 PROCEDURE — 83036 HEMOGLOBIN GLYCOSYLATED A1C: CPT | Performed by: STUDENT IN AN ORGANIZED HEALTH CARE EDUCATION/TRAINING PROGRAM

## 2022-04-14 PROCEDURE — 84550 ASSAY OF BLOOD/URIC ACID: CPT | Performed by: INTERNAL MEDICINE

## 2022-04-14 PROCEDURE — 86146 BETA-2 GLYCOPROTEIN ANTIBODY: CPT | Performed by: INTERNAL MEDICINE

## 2022-04-14 PROCEDURE — 36415 COLL VENOUS BLD VENIPUNCTURE: CPT | Performed by: INTERNAL MEDICINE

## 2022-04-14 PROCEDURE — 99214 OFFICE O/P EST MOD 30 MIN: CPT | Mod: S$PBB,GC,, | Performed by: ANESTHESIOLOGY

## 2022-04-14 PROCEDURE — 99999 PR PBB SHADOW E&M-EST. PATIENT-LVL V: CPT | Mod: PBBFAC,,, | Performed by: ANESTHESIOLOGY

## 2022-04-14 PROCEDURE — 82787 IGG 1 2 3 OR 4 EACH: CPT | Performed by: INTERNAL MEDICINE

## 2022-04-14 RX ORDER — PROMETHAZINE HYDROCHLORIDE 25 MG/1
25 TABLET ORAL EVERY 6 HOURS PRN
Qty: 30 TABLET | Refills: 0 | Status: SHIPPED | OUTPATIENT
Start: 2022-04-14 | End: 2022-05-12 | Stop reason: SDUPTHER

## 2022-04-14 RX ORDER — PREGABALIN 150 MG/1
150 CAPSULE ORAL 3 TIMES DAILY
Qty: 90 CAPSULE | Refills: 2 | Status: SHIPPED | OUTPATIENT
Start: 2022-05-12 | End: 2022-05-12 | Stop reason: SDUPTHER

## 2022-04-14 RX ORDER — METHOCARBAMOL 500 MG/1
500 TABLET, FILM COATED ORAL 3 TIMES DAILY PRN
Qty: 90 TABLET | Refills: 0 | Status: SHIPPED | OUTPATIENT
Start: 2022-05-12 | End: 2022-05-12 | Stop reason: SDUPTHER

## 2022-04-14 RX ORDER — HYDROCODONE BITARTRATE AND ACETAMINOPHEN 10; 325 MG/1; MG/1
1 TABLET ORAL EVERY 8 HOURS PRN
Qty: 90 TABLET | Refills: 0 | Status: SHIPPED | OUTPATIENT
Start: 2022-04-14 | End: 2022-05-12 | Stop reason: SDUPTHER

## 2022-04-14 RX ORDER — METHOCARBAMOL 500 MG/1
500 TABLET, FILM COATED ORAL 3 TIMES DAILY PRN
Qty: 90 TABLET | Refills: 0 | Status: SHIPPED | OUTPATIENT
Start: 2022-04-14 | End: 2022-04-14 | Stop reason: SDUPTHER

## 2022-04-14 NOTE — TELEPHONE ENCOUNTER
----- Message from David Ortiz sent at 4/14/2022  1:07 PM CDT -----  Name of Who is Calling: Lab         What is the request in detail: A critical for the patient. Please advise          Can the clinic reply by [facility]: No         What Number to Call Back if not in [facility]: [phone]

## 2022-04-14 NOTE — TELEPHONE ENCOUNTER
----- Message from [place] sent at 4/14/2022 11:32 AM CDT -----  Regarding: Pt's Daughter Esha called due to not being able to filled the pt's Rx at the pharmacy and New Rx will have to be sent for next month  Patient Advice:    Pt's Daughter Esha called due to not being able to filled the pt's Rx at the pharmacy and New Rx will have to be sent for next month,.    Ms Balbuena would like a call back today and can be reached at [phone]

## 2022-04-14 NOTE — TELEPHONE ENCOUNTER
----- Message from Estelle Lane sent at 4/14/2022  1:09 PM CDT -----  Regarding: Critical  Who called: Melissa from Lab     What is the request in detail: Patient is requesting a call back. She states she was previously on hold for too long and is requesting a message be sent to notify the staff of a critical value   Please advise.    Can the clinic reply by [facility]? No    Best call back number: [phone]     Additional Information: N/A

## 2022-04-14 NOTE — PROGRESS NOTES
Chronic patient Established Note (Follow up visit)        Interval History 4/12/2022:  Patient returns to clinic today for follow-up. Her neuropathic pain continues to be an issue for her, but she says that she is able to manage. She is taking Norco 10-325mg TID, Robaxin 500mg TID, and Lyrica 150mg TID without any adverse side effects. She denies any changes in her symptoms. She would like to proceed with SCS trial. Discussed last time that her HbA1c should be <10, was 9.8 on most recent labs. Seen by psychology and cleared for SCS.     Interval History 2/14/2022:  Mrs Waters presents for follow up. Pt states overall neuropathy continues. Since last visit she has been stable with medication mgt and takign Norco 10/325mg TID, Robaxin 500mg TID and Lyrica 150mg TID. She denies new areas of pain or neurological changes. Medication adequate to control pain without adverse SE.      Interval History 12/21/2021:  Pt presents for urgent evaluation s/p fall in kitchen last night. Pt unsure of LOC or head trauma but states some amnesia of events. Pt is having left knee pain, B ankle pain L>R and lower back/buttock pain. Daughter further states tremor like activity last night. During visit daughter asked for bedside commode due to inability to ambulate.  Pt during visit appeared somnolent, pale and began vomiting. Discussed going to ER for further evaluation.      Interval History 12/14/2021:  Mrs Waters presents for follow up of chronic pain complaints. She is hopeful she will have A1C lower soon to move forward with SCS. She is doing fair with medication mgt alone at this time and denies SE of medications. Pt is currently taking Norco 10/325mg TID PRN, Lyrica 150mg TID, and Robaxin 500mg TID. She denies new areas of pain or neurological changes.      Interval History 10/14/2021:  The Pt presents for follow up and s/p B Lumbar sympathetic blocks. Pt states this has done well but ready to proceed with SCS trial. She is aware A1C  must be under tight control and Pt and daughter re-iterate knowing this. Pt also mentions DKA admission and diagnosis of vasculitis as well over interval. Pt continues to take hydrocodone 10/325 mg TID PRN, Lyrica 150 mg TID, and Robaxin 500mg TID. She denies any SE of medication, denies new neurological changes.      Interval Hx 09/16/2021  Indira Waters presents to the clinic for a follow-up appointment for f/u after bilateral lumbar sympathetic block on 8/25/21.Patient reports >50% relief after lumbar sympathetic block that lasted 2 weeks when she then developed a UTI/DKA, was admitted to the hospital and pain recurred.  Current pain intensity is 8/10.     Interval History 8/12/2021:  Indira Waters presents to the clinic for a follow-up appointment for BLE diabetic neuropathy, painful. Continues with Norco 10/325mg, Lyrica 150mg TID, Robaxin 750mg daily PRN. She had to cancel previously scheduled lumbar sympathetic block 2/2 lithotripsy for painful kidney stones, which have now passed. She still has intermittent pain with urination but overall that pain is improving. She had to cancel previous angiogram for this same reason - this has not been rescheduled yet. She denies any change in her LE pain. Denies any new neurological sxs in BLEs.     Interval History 6/10/21:  Indira Waters presents to the clinic for a 2 week follow-up appointment from lumbar sympathetic nerve block in early May. She reports minimal relief from this intervention, but did note that it helped some, briefly. Since the last visit, Indira Waters states the pain has been persistant. Current pain intensity is 10/10. Patient has chronic generalized diffuse pain, most pronounced in her BL lower extremities 2/2 diabetic neuropathy. HSe states that the pain is a little better than at her last visit. Most days are 10/10, but some days are better than others. Her pain is aggravated by exertion, walking, or sitting/standing for extended  "periods of time. He pain is mildly improved by rest and medications. She is currently taking Lyrica 150 PO TID, Zoloft, and Norco 10-325mg TID PRN. She states that the pain meds are helping but she is still constantly in pain and unable to participate in her ADLs. She has now established care with PCP for assistance w/ poorly controlled T2DM, last A1c 11.4. She has not yet established care w/ Rheumatology for fibromyalgia management.    Interval HPI 4/15/21:  Patient returns for follow up of chronic generalized diffuse pain. At the last visit, had EMG (not yet completed), lumbar and hip x-ray imaging ordered. She was also referred to Rheumatology for fibromyalgia management and referral to PCP for DM2 management and weaning of zoloft for transition to cymbalta for treatment of neuropathy. She had her Lyrica increased to 150mg PO TID, and prescribed Norco 10mg TID with opioid contract signed. She has been taking Norco 10mg TID and it has been helping her "bad" pains but does not take all of her pain away. She has not yet been set up with her new PCP or rheumatologist yet for fibromyalgia. Still continues to have generalized pain everywhere including feet, hips, legs, lower and upper back, neck and shoulder pain. Continues to have neuropathy in the bilateral lower extremities due to uncontrolled DM2.    Initial Visit 3/11/21:  Indira Waters presents to the clinic for the evaluation of chronic pain. Complaining of pain everywhere including feet, legs, hips, lower and upper back, neck, shoulder. Pain started 5+ years ago. Pain 10/10 at worse. She was referred here by her PCP Dr. Ramos who she has been seeing for over 6 years. She states her pain is aggravated by any physical activity/movement. She takes lyrica, robaxin, and Norco 10 TID with mild relief of pain. Per patient, she was referred here by her PCP for medication refill. She has not tried physical therapy for several years, she states it did not help last time " she was in PT. She says she is trying to exercise daily by doing yoga. She walks with a walker. She has numerous comorbidities including DM2 (Last A1C 9+ per prior family medicine note in Jan 2021), fibromyalgia, lupus, DDD. She states she would like to find a new PCP as she no longer lives near her old one. Patient denies night fever/night sweats, urinary incontinence, bowel incontinence and significant weight loss.      Pain Disability Index Review:  Last 3 PDI Scores 4/14/2022 2/14/2022 12/21/2021   Pain Disability Index (PDI) 50 56 33     Pain Medications:  Norco 10-325mg TID, Robaxin 500mg TID, and Lyrica 150mg TID    Opioid Contract: yes     report:  Reviewed and consistent with medication use as prescribed.    Pain Procedures:    - reports possible back injection 10+ years ago  - Lumbar sympathetic nerve block 05/05/21 - Dr. Pereira - minimal relief    Physical Therapy/Home Exercise: no     Imaging:   Hip X-ray 4/7/21  FINDINGS:  Osseous structures appear intact without evidence of fracture or osseous destructive process.  No apparent dislocation.     Modest degenerative change or significant joint space narrowing.     Lumbar X-ray 4/7/21  FINDINGS:  Diffuse bony demineralization.  Vertebral bodies are normal in height without evidence of fracture.  No pars defects.     Normal sagittal alignment is preserved.  No spondylolisthesis. No abnormal translation with flexion and extension.     Intervertebral disc heights are well maintained.  Mild facet arthropathy in the lower lumbar spine.     Mild scattered vascular calcification.     Impression:     No evidence of fracture or malalignment.     Mild facet arthropathy in the lower lumbar spine.     Diffuse bony demineralization.  Consider correlation with DEXA.    Allergies:   Review of patient's allergies indicates:   Allergen Reactions    Bleach (sodium hypochlorite) Shortness Of Breath    Nitrofurantoin macrocrystalline Anaphylaxis    Lipitor  [atorvastatin] Diarrhea and Rash    Nsaids (non-steroidal anti-inflammatory drug)     Pcn [penicillins]     Toradol [ketorolac]        Current Medications:   Current Outpatient Medications   Medication Sig Dispense Refill    aspirin (ECOTRIN) 81 MG EC tablet Take 1 tablet (81 mg total) by mouth once daily. 30 tablet 0    blood sugar diagnostic Strp 1 each by Misc.(Non-Drug; Combo Route) route 4 (four) times daily. 200 each 0    blood-glucose meter Misc Follow package directions (Patient taking differently: Follow package directions) 1 each 0    blood-glucose meter,continuous (DEXCOM G6 ) Misc 1 each by Misc.(Non-Drug; Combo Route) route continuous prn. 1 each PRN    blood-glucose sensor (DEXCOM G6 SENSOR) Nicole 3 each by Misc.(Non-Drug; Combo Route) route continuous prn. Change every 10 days. 3 each PRN    blood-glucose transmitter (DEXCOM G6 TRANSMITTER) Nicole 1 each by Misc.(Non-Drug; Combo Route) route continuous prn. 1 each PRN    cloNIDine (CATAPRES) 0.1 MG tablet Take 1 tablet (0.1 mg total) by mouth 2 (two) times daily. 180 tablet 1    evolocumab (REPATHA SYRINGE) 140 mg/mL Syrg Inject 140 mLs into the skin every 14 (fourteen) days. 2 mL 11    furosemide (LASIX) 40 MG tablet Take 1 tablet (40 mg total) by mouth once daily. 30 tablet 11    insulin aspart U-100 (NOVOLOG) 100 unit/mL (3 mL) InPn pen Inject 10 Units into the skin 3 (three) times daily with meals. Plus correction scale. Max TDD 40units. 15 mL 3    insulin detemir U-100 (LEVEMIR FLEXTOUCH U-100 INSULN) 100 unit/mL (3 mL) InPn pen Inject 14 Units into the skin 2 (two) times daily. 15 mL 3    ipratropium-albuteroL (COMBIVENT)  mcg/actuation inhaler Inhale 1 puff into the lungs by mouth every 6 (six) hours. 4 g 0    lancets 33 gauge Misc Use 4 (four) times daily. 200 each 0    [id number]-[id number]-algal oil (METANX/FOLTANX RF) 3 mg-35 mg-2 mg -90.314 mg Cap Take 1 capsule by mouth once daily. For neuropathy in feet. 30  "capsule 3    nitroGLYCERIN (NITROSTAT) 0.4 MG SL tablet Place 1 tablet (0.4 mg total) under the tongue every 5 (five) minutes as needed for Chest pain. 25 tablet 0    pen needle, diabetic (BD ULTRA-FINE HIEN PEN NEEDLE) 32 gauge x 5/32" Ndle 1 each by Misc.(Non-Drug; Combo Route) route 4 (four) times daily. 400 each 2    sertraline (ZOLOFT) 100 MG tablet Take 1 tablet (100 mg total) by mouth once daily. 90 tablet 3    triamcinolone acetonide 0.1% (KENALOG) 0.1 % cream Apply to affected areas of body twice daily as needed rash. Do not use on face, underarms, or groin. 454 g 0    TRUEPLUS LANCETS 30 gauge Misc USE FOR TESTING FOUR TIMES DAILY 200 each 2    acetaminophen (TYLENOL) 500 MG tablet Take 2 tablets (1,000 mg total) by mouth every 8 (eight) hours as needed for Pain.  0    losartan (COZAAR) 50 MG tablet Take 1 tablet (50 mg total) by mouth once daily. 90 tablet 0    methocarbamoL (ROBAXIN) 500 MG Tab Take 1 tablet (500 mg total) by mouth 3 (three) times daily as needed (muscle spasm). 90 tablet 0    NIFEdipine (PROCARDIA-XL) 30 MG (OSM) 24 hr tablet Take 1 tablet (30 mg total) by mouth 2 (two) times a day. 180 tablet 0    pantoprazole (PROTONIX) 40 MG tablet Take 1 tablet (40 mg total) by mouth once daily. 30 tablet 0    pregabalin (LYRICA) 150 MG capsule Take 1 capsule (150 mg total) by mouth 3 (three) times daily. 90 capsule 2    promethazine (PHENERGAN) 25 MG tablet Take 1 tablet (25 mg total) by mouth every 6 (six) hours as needed for Nausea. 30 tablet 0    senna-docusate 8.6-50 mg (PERICOLACE) 8.6-50 mg per tablet Take 1 tablet by mouth 2 (two) times daily as needed for Constipation. 60 tablet 0     No current facility-administered medications for this visit.       REVIEW OF SYSTEMS:    GENERAL:  No weight loss, malaise or fevers.  HEENT:  Negative for frequent or significant headaches.  NECK:  Negative for lumps, goiter, pain and significant neck swelling.  RESPIRATORY:  Negative for cough, " wheezing or shortness of breath.  CARDIOVASCULAR:  Negative for chest pain, leg swelling or palpitations.  GI:  Negative for abdominal discomfort, blood in stools or black stools or change in bowel habits.  MUSCULOSKELETAL:  See HPI.  SKIN:  Negative for lesions, rash, and itching.  PSYCH:  Negative for sleep disturbance, mood disorder and recent psychosocial stressors.  HEMATOLOGY/LYMPHOLOGY:  Negative for prolonged bleeding, bruising easily or swollen nodes.  NEURO:   No history of headaches, syncope, paralysis, seizures or tremors.  All other reviewed and negative other than HPI.    Past Medical History:  Past Medical History:   Diagnosis Date    Arthritis     Back pain     Cancer     ovarian    Depression     Diabetes mellitus     Fibromyalgia     Hyperlipidemia     Hypertension     Lupus     Stroke     slight left sided weakness       Past Surgical History:  Past Surgical History:   Procedure Laterality Date    APPENDECTOMY       SECTION      HYSTERECTOMY      INJECTION OF ANESTHETIC AGENT AROUND NERVE Bilateral 2021    Procedure: BLOCK, NERVE, SYMPATHIC;  Surgeon: Holden Pereira MD;  Location: [facility];  Service: Pain Management;  Laterality: Bilateral;    INJECTION OF ANESTHETIC AGENT AROUND NERVE N/A 2021    Procedure: BLOCK, NERVE, SYMPATHETIC  need consent;  Surgeon: Holden Pereira MD;  Location: [facility];  Service: Pain Management;  Laterality: N/A;    NH EVAL,SWALLOW FUNCTION,CINE/VIDEO RECORD  2021         TONSILLECTOMY         Family History:  Family History   Problem Relation Age of Onset    COPD Mother     Lupus Mother     Hernia Mother     Uterine cancer Mother         vs cervical cancer    Ovarian cancer Mother     Diabetes Father     Coronary artery disease Father     Colon cancer Maternal Grandmother         in her 50's       Social History:  Social History     Socioeconomic History    Marital status:    Tobacco Use     Smoking status: Former Smoker     Quit date: 2020     Years since quittin.4    Smokeless tobacco: Never Used   Substance and Sexual Activity    Alcohol use: No    Drug use: No     Social Determinants of Health     Financial Resource Strain: Low Risk     Difficulty of Paying Living Expenses: Not hard at all   Food Insecurity: No Food Insecurity    Worried About Running Out of Food in the Last Year: Never true    Ran Out of Food in the Last Year: Never true   Transportation Needs: No Transportation Needs    Lack of Transportation (Medical): No    Lack of Transportation (Non-Medical): No   Physical Activity: Insufficiently Active    Days of Exercise per Week: 3 days    Minutes of Exercise per Session: 20 min   Stress: No Stress Concern Present    Feeling of Stress : Not at all   Social Connections: Socially Isolated    Frequency of Communication with Friends and Family: More than three times a week    Frequency of Social Gatherings with Friends and Family: Never    Attends Orthodoxy Services: Never    Active Member of Clubs or Organizations: No    Attends Club or Organization Meetings: Never    Marital Status:    Housing Stability: Low Risk     Unable to Pay for Housing in the Last Year: No    Number of Places Lived in the Last Year: 1    Unstable Housing in the Last Year: No       OBJECTIVE:    BP (!) 140/71   Pulse 96   Temp 97.7 °F (36.5 °C)   Resp 18   Ht 5' (1.524 m)   Wt 84 kg (185 lb 3 oz)   SpO2 98%   BMI 36.17 kg/m²     PHYSICAL EXAMINATION:    General appearance: Well appearing, in no acute distress, alert and oriented x3.  Psych:  Mood and affect appropriate.  Skin: Skin color, texture, turgor normal, no rashes or lesions, in both upper and lower body.  Head/face:  Atraumatic, normocephalic. No palpable lymph nodes  Cor: RRR  Pulm: Non-labored  Back: Straight leg raising in the sitting and supine positions is negative to radicular pain. No pain to palpation over the  spine or costovertebral angles.  Extremities: Peripheral joint ROM is full and pain free without obvious instability or laxity in all four extremities. No deformities, edema, or skin discoloration. Good capillary refill.  Musculoskeletal: Bilateral upper and lower extremity strength is normal and symmetric.  No atrophy or tone abnormalities are noted.  Neuro: Muscle stretch reflexes are diminished but symmetric. Altered sensation in BLE at baseline. Painful paresthesias in BLE.   Gait: Antalgic, uses RW for ambulation    ASSESSMENT: 77 y.o. year old female with chronic pain, consistent with:     1. Diabetic polyneuropathy associated with diabetes mellitus due to underlying condition     2. Type 2 diabetes mellitus with hyperglycemia, with long-term current use of insulin     3. Encounter for therapeutic drug monitoring     4. Chronic pain disorder     5. Diabetic polyneuropathy associated with type 2 diabetes mellitus     6. Painful diabetic neuropathy         PLAN:     - I have stressed the importance of physical activity and a home exercise plan to help with pain and improve health.  - Patient can continue with medications for now since they are providing benefits, using them appropriately, and without side effects.  - Discussed Nevro SCS implant for PDN. RBA discussed, and she would like to proceed.   - Repeat HbA1c today. If <10, will proceed with SCS trial.  - UDS 2/14/22 reviewed.   - Refill Robaxin 500mg TID #90  - Refilled Norco 10/325mg TID #90  - Refill Lyrica 150mg TID #90  - Will schedule for SCS trial pending lab results  - In the meantime, counseled patient regarding the importance of activity modification and physical activity as tolerated. Also counseled patient on close blood glucose control.  - RTC 1 week after SCS trail   - Counseled patient regarding the importance of activity modification, constant sleeping habits and physical therapy.      The above plan and management options were discussed at  length with patient. Patient is in agreement with the above and verbalized understanding.    River James MD  [employer], PGY-3   I have personally reviewed the history and exam of this patient and agree with the resident/fellow/NPs note as stated above.    Holden Pereira MD  4/14/22

## 2022-04-14 NOTE — TELEPHONE ENCOUNTER
Refill Routing Note   Medication(s) are not appropriate for processing by [facility] for the following reason(s):      - Outside of protocol    [facility] action(s):  Route          Medication reconciliation completed: No     Appointments  past 12m or future 3m with PCP    Date Provider   Last Visit   11/9/2021 Chun Zapata MD   Next Visit   4/19/2022 Chun Zapata MD   ED visits in past 90 days: 0        Note composed:8:30 AM 04/14/2022

## 2022-04-15 LAB
HBV SURFACE AB SER-ACNC: NEGATIVE M[IU]/ML
HBV SURFACE AG SERPL QL IA: NEGATIVE
HCV AB SERPL QL IA: NEGATIVE
HIV 1+2 AB+HIV1 P24 AG SERPL QL IA: NEGATIVE

## 2022-04-16 LAB
B2 GLYCOPROT1 IGA SER QL: <9 SAU
B2 GLYCOPROT1 IGG SER QL: <9 SGU
B2 GLYCOPROT1 IGM SER QL: <9 SMU

## 2022-04-17 LAB — HISTONE IGG SER IA-ACNC: 0.3 UNITS (ref 0–0.9)

## 2022-04-18 LAB
ALBUMIN SERPL ELPH-MCNC: 4.37 G/DL (ref 3.35–5.55)
ALPHA1 GLOB SERPL ELPH-MCNC: 0.36 G/DL (ref 0.17–0.41)
ALPHA2 GLOB SERPL ELPH-MCNC: 1.01 G/DL (ref 0.43–0.99)
ANA SER QL IF: NORMAL
ANCA AB TITR SER IF: NORMAL TITER
B-GLOBULIN SERPL ELPH-MCNC: 1.08 G/DL (ref 0.5–1.1)
CARDIOLIPIN IGG SER IA-ACNC: <9.4 GPL (ref 0–14.99)
CARDIOLIPIN IGM SER IA-ACNC: 18.3 MPL (ref 0–12.49)
ENA SCL70 AB SER-ACNC: 7 UNITS
GAMMA GLOB SERPL ELPH-MCNC: 0.88 G/DL (ref 0.67–1.58)
IGG1 SER-MCNC: 500 MG/DL (ref 382–929)
IGG2 SER-MCNC: 294 MG/DL (ref 242–700)
IGG3 SER-MCNC: 31 MG/DL (ref 22–176)
IGG4 SER-MCNC: 88 MG/DL (ref 4–86)
INTERPRETATION SERPL IFE-IMP: NORMAL
P-ANCA TITR SER IF: NORMAL TITER
PROT SERPL-MCNC: 7.7 G/DL (ref 6–8.4)

## 2022-04-19 LAB
ANTI-SSB ANTIBODY: 0.08 RATIO (ref 0–0.99)
ANTI-SSB INTERPRETATION: NEGATIVE
APTT IMM NP PPP: NORMAL SEC (ref 32–48)
APTT P HEP NEUT PPP: NORMAL SEC (ref 32–48)
CONFIRM APTT STACLOT: NORMAL
DRVVT SCREEN TO CONFIRM RATIO: NORMAL RATIO
LA 3 SCREEN W REFLEX-IMP: NORMAL
LA NT DPL PPP QL: NORMAL
MIXING DRVVT: NORMAL SEC (ref 33–44)
PROTHROMBIN TIME: 12.9 SEC (ref 12–15.5)
REPTILASE TIME: NORMAL SEC
SCREEN APTT: 39 SEC (ref 32–48)
SCREEN DRVVT: 37 SEC (ref 33–44)
THROMBIN TIME: NORMAL SEC (ref 14.7–19.5)

## 2022-04-20 ENCOUNTER — OFFICE VISIT (OUTPATIENT)
Dept: INTERNAL MEDICINE | Facility: CLINIC | Age: 77
End: 2022-04-20
Payer: MEDICARE

## 2022-04-20 VITALS
HEART RATE: 74 BPM | OXYGEN SATURATION: 95 % | WEIGHT: 185.63 LBS | BODY MASS INDEX: 36.25 KG/M2 | SYSTOLIC BLOOD PRESSURE: 114 MMHG | DIASTOLIC BLOOD PRESSURE: 66 MMHG

## 2022-04-20 DIAGNOSIS — I25.84 CORONARY ARTERY CALCIFICATION: Chronic | ICD-10-CM

## 2022-04-20 DIAGNOSIS — E08.42 DIABETIC POLYNEUROPATHY ASSOCIATED WITH DIABETES MELLITUS DUE TO UNDERLYING CONDITION: Chronic | ICD-10-CM

## 2022-04-20 DIAGNOSIS — Z85.41 HISTORY OF CERVICAL CANCER: ICD-10-CM

## 2022-04-20 DIAGNOSIS — F32.A DEPRESSION, UNSPECIFIED DEPRESSION TYPE: Chronic | ICD-10-CM

## 2022-04-20 DIAGNOSIS — I25.10 CORONARY ARTERY CALCIFICATION: Chronic | ICD-10-CM

## 2022-04-20 DIAGNOSIS — M06.9 RHEUMATOID ARTHRITIS, INVOLVING UNSPECIFIED SITE, UNSPECIFIED WHETHER RHEUMATOID FACTOR PRESENT: ICD-10-CM

## 2022-04-20 DIAGNOSIS — K21.9 GASTROESOPHAGEAL REFLUX DISEASE WITHOUT ESOPHAGITIS: Chronic | ICD-10-CM

## 2022-04-20 DIAGNOSIS — Z87.442 HISTORY OF RENAL CALCULI: ICD-10-CM

## 2022-04-20 DIAGNOSIS — M31.0 LEUCOCYTOCLASTIC VASCULITIS: ICD-10-CM

## 2022-04-20 DIAGNOSIS — J44.9 CAFL (CHRONIC AIRFLOW LIMITATION): ICD-10-CM

## 2022-04-20 DIAGNOSIS — Z79.4 TYPE 2 DIABETES MELLITUS WITH HYPERGLYCEMIA, WITH LONG-TERM CURRENT USE OF INSULIN: Chronic | ICD-10-CM

## 2022-04-20 DIAGNOSIS — Z87.891 FORMER SMOKER: ICD-10-CM

## 2022-04-20 DIAGNOSIS — N18.4 STAGE 4 CHRONIC KIDNEY DISEASE: Primary | ICD-10-CM

## 2022-04-20 DIAGNOSIS — M79.7 FIBROMYALGIA: Chronic | ICD-10-CM

## 2022-04-20 DIAGNOSIS — F41.1 GENERALIZED ANXIETY DISORDER: ICD-10-CM

## 2022-04-20 DIAGNOSIS — C53.9: ICD-10-CM

## 2022-04-20 DIAGNOSIS — G89.4 CHRONIC PAIN SYNDROME: ICD-10-CM

## 2022-04-20 DIAGNOSIS — E11.65 TYPE 2 DIABETES MELLITUS WITH HYPERGLYCEMIA, WITH LONG-TERM CURRENT USE OF INSULIN: Chronic | ICD-10-CM

## 2022-04-20 DIAGNOSIS — I10 ESSENTIAL HYPERTENSION: Chronic | ICD-10-CM

## 2022-04-20 DIAGNOSIS — I50.33 ACUTE ON CHRONIC DIASTOLIC HEART FAILURE: ICD-10-CM

## 2022-04-20 DIAGNOSIS — E78.01 FAMILIAL HYPERCHOLESTEROLEMIA: Chronic | ICD-10-CM

## 2022-04-20 DIAGNOSIS — E66.01 CLASS 2 SEVERE OBESITY DUE TO EXCESS CALORIES WITH SERIOUS COMORBIDITY AND BODY MASS INDEX (BMI) OF 36.0 TO 36.9 IN ADULT: ICD-10-CM

## 2022-04-20 DIAGNOSIS — E11.59 TYPE 2 DIABETES MELLITUS WITH OTHER CIRCULATORY COMPLICATION, WITHOUT LONG-TERM CURRENT USE OF INSULIN: ICD-10-CM

## 2022-04-20 DIAGNOSIS — K31.84 GASTROPARESIS: ICD-10-CM

## 2022-04-20 DIAGNOSIS — I20.89 ANGINA AT REST: ICD-10-CM

## 2022-04-20 DIAGNOSIS — M54.15 RADICULOPATHY OF THORACOLUMBAR REGION: ICD-10-CM

## 2022-04-20 PROBLEM — E11.10 DIABETIC KETOACIDOSIS WITHOUT COMA ASSOCIATED WITH TYPE 2 DIABETES MELLITUS: Status: RESOLVED | Noted: 2021-09-05 | Resolved: 2022-04-20

## 2022-04-20 PROBLEM — E66.9 MILD OBESITY: Status: RESOLVED | Noted: 2021-06-11 | Resolved: 2022-04-20

## 2022-04-20 PROBLEM — E66.812 CLASS 2 SEVERE OBESITY DUE TO EXCESS CALORIES WITH SERIOUS COMORBIDITY AND BODY MASS INDEX (BMI) OF 36.0 TO 36.9 IN ADULT: Status: RESOLVED | Noted: 2021-06-11 | Resolved: 2022-04-20

## 2022-04-20 LAB
PATHOLOGIST INTERPRETATION IFE: NORMAL
PATHOLOGIST INTERPRETATION SPE: NORMAL

## 2022-04-20 PROCEDURE — 99215 OFFICE O/P EST HI 40 MIN: CPT | Mod: S$PBB,,, | Performed by: STUDENT IN AN ORGANIZED HEALTH CARE EDUCATION/TRAINING PROGRAM

## 2022-04-20 PROCEDURE — 99213 OFFICE O/P EST LOW 20 MIN: CPT | Mod: PBBFAC | Performed by: STUDENT IN AN ORGANIZED HEALTH CARE EDUCATION/TRAINING PROGRAM

## 2022-04-20 PROCEDURE — 99215 PR OFFICE/OUTPT VISIT, EST, LEVL V, 40-54 MIN: ICD-10-PCS | Mod: S$PBB,,, | Performed by: STUDENT IN AN ORGANIZED HEALTH CARE EDUCATION/TRAINING PROGRAM

## 2022-04-20 PROCEDURE — 99999 PR PBB SHADOW E&M-EST. PATIENT-LVL III: CPT | Mod: PBBFAC,,, | Performed by: STUDENT IN AN ORGANIZED HEALTH CARE EDUCATION/TRAINING PROGRAM

## 2022-04-20 PROCEDURE — 99999 PR PBB SHADOW E&M-EST. PATIENT-LVL III: ICD-10-PCS | Mod: PBBFAC,,, | Performed by: STUDENT IN AN ORGANIZED HEALTH CARE EDUCATION/TRAINING PROGRAM

## 2022-04-20 RX ORDER — INSULIN ASPART 100 [IU]/ML
12 INJECTION, SOLUTION INTRAVENOUS; SUBCUTANEOUS
Qty: 15 ML | Refills: 3 | Status: SHIPPED | OUTPATIENT
Start: 2022-04-20 | End: 2022-10-07 | Stop reason: SDUPTHER

## 2022-04-20 RX ORDER — METOCLOPRAMIDE 5 MG/1
5 TABLET ORAL 4 TIMES DAILY PRN
Qty: 30 TABLET | Refills: 3 | Status: SHIPPED | OUTPATIENT
Start: 2022-04-20 | End: 2022-08-21 | Stop reason: SDUPTHER

## 2022-04-20 RX ORDER — NITROGLYCERIN 0.4 MG/1
0.4 TABLET SUBLINGUAL EVERY 5 MIN PRN
Qty: 25 TABLET | Refills: 0 | Status: SHIPPED | OUTPATIENT
Start: 2022-04-20 | End: 2022-05-12 | Stop reason: SDUPTHER

## 2022-04-20 RX ORDER — INSULIN DETEMIR 100 [IU]/ML
20 INJECTION, SOLUTION SUBCUTANEOUS 2 TIMES DAILY
Qty: 15 ML | Refills: 3 | Status: SHIPPED | OUTPATIENT
Start: 2022-04-20 | End: 2022-10-07 | Stop reason: SDUPTHER

## 2022-04-20 NOTE — PATIENT INSTRUCTIONS
-Increased levemir from 14 units twice a day to 20 units twice a day  -Increased novolog 10 three times a day now 12 three times a day  -Try reglan for nausea, to promote digestion  -diabetes education  -I sent in more nitroglycerin  -It was the statin that we stopped due to suspected drug reaction/skin changes (not lisinopril)

## 2022-04-20 NOTE — PROGRESS NOTES
[facility]    Subjective:      Patient ID: Indira Waters is a 77 y.o. female.    Chief Complaint: Diabetes    History was obtained from the patient and supplemented through chart review.  This pt is known to me.    HPI:    Patient is a 77 y.o. female with chronic medical problems including DM, HTN, HLD, CAD, fibrmomyalgia    DM  Uncontrolled  11.6 4/14/2022  Hypoglycemia months ago now into 40's, but   Ice cream, grits, oatmeal, apple juice, pasta- spaghetti  Not much rice  levemir changed from 14 BID to 20 BID  Novolog 10 TID with meals to 12 TID  Am glucoses often in 300's, sometimes high 200's  Still with nausea  Went to  Unable to get dexcom thus far   Suspect gastroparesis, takes phenergan regularly.  Recommended trial of reglan to substitue    Severely deconditioned    CAD  Seen by Dr. Montalvo and Dr. Méndez  Wishes to follow with Dr. Méndez  Potential Cardiac Cath in future, needs to return  Interrupted with a number of health problems  Taking ASA 81    Fibromyalgia  Chronic Pain Syndrome  Potential plan for surgical implantation of spinal cord stimulator with Dr. Covington if can get A1C < 10  Roabxin, norco 10    COPD/asthma?  combivent    HTN  Controlled  Procardia 30 BID, losartan 50 mg daily, clonidine 0.1 mg BID    CHF   EF 55%, Grade I DD 10/2021  Lasix 40 mg daily    Drug induced Vasculitis  Thought to be 2/2 to atorvastatin, switched to repatha q 14 days    CKD Stage 3- worsening to CKD Stage 4?  Nephrology referrology  Avoid nsaids, nephrotoxic meds      Advance Care Planning   Wishes to be full code. Wants to reverse prior decisions (had been concerned about intubation prior).        Medical History  Past Medical History:   Diagnosis Date    Arthritis     Back pain     Cancer     ovarian    Depression     Diabetes mellitus     Fibromyalgia     Hyperlipidemia     Hypertension     Lupus     Stroke     slight left sided weakness       Review of Systems   Constitutional:  Negative for fever.        Deconditioned   HENT: Negative for trouble swallowing.    Respiratory: Positive for shortness of breath (improves with rest).    Cardiovascular: Negative for chest pain.   Gastrointestinal: Positive for abdominal pain and diarrhea. Negative for constipation, nausea and vomiting.   Genitourinary: Positive for dysuria.   Musculoskeletal: Positive for arthralgias (improving) and gait problem.   Skin: Positive for pallor.   Neurological: Positive for light-headedness (on exertion, improves with rest). Negative for dizziness and seizures.   Psychiatric/Behavioral: Negative for hallucinations.         Surgical hx, family hx, social hx   Have been reviewed      Current Outpatient Medications:     acetaminophen (TYLENOL) 500 MG tablet, Take 2 tablets (1,000 mg total) by mouth every 8 (eight) hours as needed for Pain., Disp: , Rfl: 0    aspirin (ECOTRIN) 81 MG EC tablet, Take 1 tablet (81 mg total) by mouth once daily., Disp: 30 tablet, Rfl: 0    blood sugar diagnostic Strp, 1 each by Misc.(Non-Drug; Combo Route) route 4 (four) times daily., Disp: 200 each, Rfl: 0    blood-glucose meter Misc, Follow package directions (Patient taking differently: Follow package directions), Disp: 1 each, Rfl: 0    blood-glucose meter,continuous (DEXCOM G6 ) Misc, 1 each by Misc.(Non-Drug; Combo Route) route continuous prn., Disp: 1 each, Rfl: PRN    blood-glucose sensor (DEXCOM G6 SENSOR) Nicole, 3 each by Misc.(Non-Drug; Combo Route) route continuous prn. Change every 10 days., Disp: 3 each, Rfl: PRN    blood-glucose transmitter (DEXCOM G6 TRANSMITTER) Nicole, 1 each by Misc.(Non-Drug; Combo Route) route continuous prn., Disp: 1 each, Rfl: PRN    cloNIDine (CATAPRES) 0.1 MG tablet, Take 1 tablet (0.1 mg total) by mouth 2 (two) times daily., Disp: 180 tablet, Rfl: 1    evolocumab (REPATHA SYRINGE) 140 mg/mL Syrg, Inject 140 mLs into the skin every 14 (fourteen) days., Disp: 2 mL, Rfl: 11    furosemide  "(LASIX) 40 MG tablet, Take 1 tablet (40 mg total) by mouth once daily., Disp: 30 tablet, Rfl: 11    HYDROcodone-acetaminophen (NORCO)  mg per tablet, Take 1 tablet by mouth every 8 (eight) hours as needed for Pain., Disp: 90 tablet, Rfl: 0    ipratropium-albuteroL (COMBIVENT)  mcg/actuation inhaler, Inhale 1 puff into the lungs by mouth every 6 (six) hours., Disp: 4 g, Rfl: 0    lancets 33 gauge Misc, Use 4 (four) times daily., Disp: 200 each, Rfl: 0    [id number]-algal oil (METANX/FOLTANX RF) 3 mg-35 mg-2 mg -90.314 mg Cap, Take 1 capsule by mouth once daily. For neuropathy in feet., Disp: 30 capsule, Rfl: 3    [START ON 5/12/2022] methocarbamoL (ROBAXIN) 500 MG Tab, Take 1 tablet (500 mg total) by mouth 3 (three) times daily as needed (muscle spasm)., Disp: 90 tablet, Rfl: 0    pantoprazole (PROTONIX) 40 MG tablet, Take 1 tablet (40 mg total) by mouth once daily., Disp: 30 tablet, Rfl: 0    pen needle, diabetic (BD ULTRA-FINE HIEN PEN NEEDLE) 32 gauge x 5/32" Ndle, 1 each by Misc.(Non-Drug; Combo Route) route 4 (four) times daily., Disp: 400 each, Rfl: 2    [START ON 5/12/2022] pregabalin (LYRICA) 150 MG capsule, Take 1 capsule (150 mg total) by mouth 3 (three) times daily., Disp: 90 capsule, Rfl: 2    promethazine (PHENERGAN) 25 MG tablet, Take 1 tablet (25 mg total) by mouth every 6 (six) hours as needed for Nausea., Disp: 30 tablet, Rfl: 0    senna-docusate 8.6-50 mg (PERICOLACE) 8.6-50 mg per tablet, Take 1 tablet by mouth 2 (two) times daily as needed for Constipation., Disp: 60 tablet, Rfl: 0    sertraline (ZOLOFT) 100 MG tablet, Take 1 tablet (100 mg total) by mouth once daily., Disp: 90 tablet, Rfl: 3    triamcinolone acetonide 0.1% (KENALOG) 0.1 % cream, Apply to affected areas of body twice daily as needed rash. Do not use on face, underarms, or groin., Disp: 454 g, Rfl: 0    TRUEPLUS LANCETS 30 gauge Misc, USE FOR TESTING FOUR TIMES DAILY, Disp: 200 each, Rfl: 2    " insulin aspart U-100 (NOVOLOG) 100 unit/mL (3 mL) InPn pen, Inject 12 Units into the skin 3 (three) times daily with meals. Plus correction scale. Max TDD 40units., Disp: 15 mL, Rfl: 3    insulin detemir U-100 (LEVEMIR FLEXTOUCH U-100 INSULN) 100 unit/mL (3 mL) InPn pen, Inject 20 Units into the skin 2 (two) times daily., Disp: 15 mL, Rfl: 3    losartan (COZAAR) 50 MG tablet, Take 1 tablet (50 mg total) by mouth once daily., Disp: 90 tablet, Rfl: 0    metoclopramide HCl (REGLAN) 5 MG tablet, Take 1 tablet (5 mg total) by mouth 4 (four) times daily as needed (nausea, prevention)., Disp: 30 tablet, Rfl: 3    NIFEdipine (PROCARDIA-XL) 30 MG (OSM) 24 hr tablet, Take 1 tablet (30 mg total) by mouth 2 (two) times a day., Disp: 180 tablet, Rfl: 0    nitroGLYCERIN (NITROSTAT) 0.4 MG SL tablet, Place 1 tablet (0.4 mg total) under the tongue every 5 (five) minutes as needed for Chest pain., Disp: 25 tablet, Rfl: 0    Objective:        Body mass index is 36.25 kg/m².  Vitals:    04/20/22 1303   BP: 114/66   Pulse: 74   SpO2: 95%   Weight: 84.2 kg (185 lb 10 oz)     Physical Exam  Vitals and nursing note reviewed.   Constitutional:       General: She is not in acute distress.     Appearance: Normal appearance. She is not ill-appearing.      Comments: wheelchair   HENT:      Head: Normocephalic and atraumatic.      Mouth/Throat:      Comments: Wearing a mask  Removed for exam  Left sided facial weakness  Eyes:      General: No scleral icterus.  Cardiovascular:      Rate and Rhythm: Normal rate.      Comments: Diminished heart sounds  Pulmonary:      Effort: Pulmonary effort is normal.      Breath sounds: Wheezing (with forceful expiration) present.   Abdominal:      General: There is no distension.   Musculoskeletal:         General: No deformity.      Cervical back: Normal range of motion.   Skin:     General: Skin is warm and dry.   Neurological:      Mental Status: She is alert and oriented to person, place, and time.    Psychiatric:         Behavior: Behavior normal.                       Lab Results   Component Value Date    WBC 14.42 (H) 04/14/2022    HGB 12.1 04/14/2022    HCT 36.7 (L) 04/14/2022     04/14/2022    CHOL 186 10/21/2021    TRIG 195 (H) 10/21/2021    HDL 38 (L) 10/21/2021    ALT 8 (L) 04/14/2022    AST 12 04/14/2022     04/14/2022    K 4.2 04/14/2022    CL 99 04/14/2022    CREATININE 1.6 (H) 04/14/2022    BUN 29 (H) 04/14/2022    CO2 21 (L) 04/14/2022    TSH 2.084 04/14/2022    INR 0.9 09/22/2021    HGBA1C 11.6 (H) 04/14/2022       The 10-year ASCVD risk score (Osiris CAMPBELL Jr., et al., 2013) is: 32.7%    Values used to calculate the score:      Age: 77 years      Sex: Female      Is Non- : No      Diabetic: Yes      Tobacco smoker: No      Systolic Blood Pressure: 114 mmHg      Is BP treated: Yes      HDL Cholesterol: 38 mg/dL      Total Cholesterol: 186 mg/dL    Repatha    (Imaging have been independently reviewed)      Assessment:         1. Stage 4 chronic kidney disease    2. Type 2 diabetes mellitus with other circulatory complication, without long-term current use of insulin    3. Gastroparesis    4. Angina at rest    5. Radiculopathy of thoracolumbar region    6. Chronic pain syndrome    7. Diabetic polyneuropathy associated with diabetes mellitus due to underlying condition    8. Depression, unspecified depression type    9. Generalized anxiety disorder    10. CAFL (chronic airflow limitation)    11. Coronary artery calcification    12. Essential hypertension    13. Familial hypercholesterolemia    14. Acute on chronic diastolic heart failure    15. History of renal calculi    16. Rheumatoid arthritis, involving unspecified site, unspecified whether rheumatoid factor present    17. Leucocytoclastic vasculitis    18. Carcinoma of uterine cervix, invasive    19. History of cervical cancer    20. Type 2 diabetes mellitus with hyperglycemia, with long-term current use of  insulin    21. Class 2 severe obesity due to excess calories with serious comorbidity and body mass index (BMI) of 36.0 to 36.9 in adult    22. Gastroesophageal reflux disease without esophagitis    23. Fibromyalgia    24. Former smoker          Plan:     Indira was seen today for diabetes.    Diagnoses and all orders for this visit:    Stage 4 chronic kidney disease  -     Ambulatory referral/consult to Nephrology; Future    Type 2 diabetes mellitus with other circulatory complication, without long-term current use of insulin  -     insulin detemir U-100 (LEVEMIR FLEXTOUCH U-100 INSULN) 100 unit/mL (3 mL) InPn pen; Inject 20 Units into the skin 2 (two) times daily.  -     insulin aspart U-100 (NOVOLOG) 100 unit/mL (3 mL) InPn pen; Inject 12 Units into the skin 3 (three) times daily with meals. Plus correction scale. Max TDD 40units.    Gastroparesis  -     metoclopramide HCl (REGLAN) 5 MG tablet; Take 1 tablet (5 mg total) by mouth 4 (four) times daily as needed (nausea, prevention).    Angina at rest  -     nitroGLYCERIN (NITROSTAT) 0.4 MG SL tablet; Place 1 tablet (0.4 mg total) under the tongue every 5 (five) minutes as needed for Chest pain.    Radiculopathy of thoracolumbar region    Chronic pain syndrome    Diabetic polyneuropathy associated with diabetes mellitus due to underlying condition    Depression, unspecified depression type    Generalized anxiety disorder    CAFL (chronic airflow limitation)    Coronary artery calcification    Essential hypertension    Familial hypercholesterolemia    Acute on chronic diastolic heart failure    History of renal calculi    Rheumatoid arthritis, involving unspecified site, unspecified whether rheumatoid factor present    Leucocytoclastic vasculitis    Carcinoma of uterine cervix, invasive    History of cervical cancer  -     Ambulatory referral/consult to Obstetrics / Gynecology; Future    Type 2 diabetes mellitus with hyperglycemia, with long-term current use of  insulin    Class 2 severe obesity due to excess calories with serious comorbidity and body mass index (BMI) of 36.0 to 36.9 in adult    Gastroesophageal reflux disease without esophagitis    Fibromyalgia    Former smoker        Follow up in about 2 months (around 6/20/2022). or sooner prn      50 min were used in chart review, evaluation and counseling of patient including counseling on diabetes compliance (A1C 11) and dangers, end of life conversation, coordiantion of care, documentation and review of results on same day of service     All medications were reviewed including potential side effects and risks/benefits.  Pt was counseled to call back if anything worsens or if questions arise.    Chun Zapata MD  Family Medicine  [facility]  [id number]  [place] [address]  Phone [phone]  Fax [phone]

## 2022-04-21 ENCOUNTER — PATIENT MESSAGE (OUTPATIENT)
Dept: ADMINISTRATIVE | Facility: OTHER | Age: 77
End: 2022-04-21
Payer: MEDICARE

## 2022-04-21 ENCOUNTER — TELEPHONE (OUTPATIENT)
Dept: INTERNAL MEDICINE | Facility: CLINIC | Age: 77
End: 2022-04-21
Payer: MEDICARE

## 2022-04-21 DIAGNOSIS — E11.69 TYPE 2 DIABETES MELLITUS WITH OTHER SPECIFIED COMPLICATION, UNSPECIFIED WHETHER LONG TERM INSULIN USE: Primary | ICD-10-CM

## 2022-04-21 NOTE — TELEPHONE ENCOUNTER
----- Message from Chun Zapata MD sent at 4/20/2022  3:13 PM CDT -----  Please make gyn appt.  Daughter and pt aware.  thanks

## 2022-04-22 ENCOUNTER — TELEPHONE (OUTPATIENT)
Dept: ADMINISTRATIVE | Facility: OTHER | Age: 77
End: 2022-04-22
Payer: MEDICARE

## 2022-04-22 NOTE — TELEPHONE ENCOUNTER
Not able to leave a message to discuss scheduling [facility] appointment as there is no voice mail. Portal message will be sent.

## 2022-04-25 LAB — CRYOGLOB SER QL: NORMAL

## 2022-04-27 ENCOUNTER — OFFICE VISIT (OUTPATIENT)
Dept: CARDIOLOGY | Facility: CLINIC | Age: 77
End: 2022-04-27
Payer: MEDICARE

## 2022-04-27 VITALS
DIASTOLIC BLOOD PRESSURE: 80 MMHG | OXYGEN SATURATION: 95 % | WEIGHT: 179.5 LBS | SYSTOLIC BLOOD PRESSURE: 132 MMHG | HEART RATE: 95 BPM | BODY MASS INDEX: 35.06 KG/M2

## 2022-04-27 DIAGNOSIS — E11.22 TYPE 2 DIABETES MELLITUS WITH STAGE 3A CHRONIC KIDNEY DISEASE, WITH LONG-TERM CURRENT USE OF INSULIN: ICD-10-CM

## 2022-04-27 DIAGNOSIS — I25.118 ATHEROSCLEROSIS OF NATIVE CORONARY ARTERY OF NATIVE HEART WITH STABLE ANGINA PECTORIS: Primary | ICD-10-CM

## 2022-04-27 DIAGNOSIS — E78.00 HYPERCHOLESTEROLEMIA: ICD-10-CM

## 2022-04-27 DIAGNOSIS — E66.01 SEVERE OBESITY: ICD-10-CM

## 2022-04-27 DIAGNOSIS — I10 ESSENTIAL HYPERTENSION: ICD-10-CM

## 2022-04-27 DIAGNOSIS — N18.31 TYPE 2 DIABETES MELLITUS WITH STAGE 3A CHRONIC KIDNEY DISEASE, WITH LONG-TERM CURRENT USE OF INSULIN: ICD-10-CM

## 2022-04-27 DIAGNOSIS — Z79.4 TYPE 2 DIABETES MELLITUS WITH STAGE 3A CHRONIC KIDNEY DISEASE, WITH LONG-TERM CURRENT USE OF INSULIN: ICD-10-CM

## 2022-04-27 PROCEDURE — 99999 PR PBB SHADOW E&M-EST. PATIENT-LVL III: CPT | Mod: PBBFAC,,, | Performed by: INTERNAL MEDICINE

## 2022-04-27 PROCEDURE — 99215 PR OFFICE/OUTPT VISIT, EST, LEVL V, 40-54 MIN: ICD-10-PCS | Mod: S$PBB,,, | Performed by: INTERNAL MEDICINE

## 2022-04-27 PROCEDURE — 99213 OFFICE O/P EST LOW 20 MIN: CPT | Mod: PBBFAC | Performed by: INTERNAL MEDICINE

## 2022-04-27 PROCEDURE — 99999 PR PBB SHADOW E&M-EST. PATIENT-LVL III: ICD-10-PCS | Mod: PBBFAC,,, | Performed by: INTERNAL MEDICINE

## 2022-04-27 PROCEDURE — 99215 OFFICE O/P EST HI 40 MIN: CPT | Mod: S$PBB,,, | Performed by: INTERNAL MEDICINE

## 2022-04-27 RX ORDER — METOPROLOL SUCCINATE 50 MG/1
50 TABLET, EXTENDED RELEASE ORAL NIGHTLY
Qty: 30 TABLET | Refills: 11 | Status: ON HOLD | OUTPATIENT
Start: 2022-04-27 | End: 2022-05-07 | Stop reason: SDUPTHER

## 2022-04-27 RX ORDER — CLOPIDOGREL BISULFATE 75 MG/1
75 TABLET ORAL DAILY
Qty: 30 TABLET | Refills: 11 | Status: SHIPPED | OUTPATIENT
Start: 2022-04-27 | End: 2022-09-29

## 2022-04-27 RX ORDER — SODIUM CHLORIDE 9 MG/ML
INJECTION, SOLUTION INTRAVENOUS CONTINUOUS
Status: CANCELLED | OUTPATIENT
Start: 2022-04-27

## 2022-04-27 NOTE — PATIENT INSTRUCTIONS
Procedure: cardiac catheterization  Procedure date: 5/4/22  Procedure time: 9:30am    Arrive at the Outpatient Surgery Unit ([facility]) at 7:30am.  Nothing to eat or drink after midnight.  Take all morning medication with a sip of water.  If you are diabetic, do NOT take any diabetes medication the morning of the procedure.   Please make arrangements for a family member or friend to bring you home after the procedure. You will not be able to drive home.          If you have any questions, please call our office at [phone].

## 2022-04-27 NOTE — H&P (VIEW-ONLY)
[facility]    Chief Complaint  Chief Complaint   Patient presents with    Coronary Artery Disease       HPI:    Patient finally presents for follow-up.  Was last seen in October when she was hospitalized with DKA.  Has recently been taking more nitroglycerin.  May take up to 3 per day.  Gets dyspneic and has chest discomfort when she exerts herself.  Relieved with rest and a single nitroglycerin.  Inquires about scheduling angiography that we discussed at her visit last September.    Medications  Current Outpatient Medications   Medication Sig Dispense Refill    acetaminophen (TYLENOL) 500 MG tablet Take 2 tablets (1,000 mg total) by mouth every 8 (eight) hours as needed for Pain.  0    aspirin (ECOTRIN) 81 MG EC tablet Take 1 tablet (81 mg total) by mouth once daily. 30 tablet 0    blood sugar diagnostic Strp 1 each by Misc.(Non-Drug; Combo Route) route 4 (four) times daily. 200 each 0    blood-glucose meter Misc Follow package directions (Patient taking differently: Follow package directions) 1 each 0    blood-glucose meter,continuous (DEXCOM G6 ) Misc 1 each by Misc.(Non-Drug; Combo Route) route continuous prn. 1 each PRN    blood-glucose sensor (DEXCOM G6 SENSOR) Nicole 3 each by Misc.(Non-Drug; Combo Route) route continuous prn. Change every 10 days. 3 each PRN    blood-glucose transmitter (DEXCOM G6 TRANSMITTER) Nicole 1 each by Misc.(Non-Drug; Combo Route) route continuous prn. 1 each PRN    cloNIDine (CATAPRES) 0.1 MG tablet Take 1 tablet (0.1 mg total) by mouth 2 (two) times daily. 180 tablet 1    clopidogreL (PLAVIX) 75 mg tablet Take 1 tablet (75 mg total) by mouth once daily. 30 tablet 11    furosemide (LASIX) 40 MG tablet Take 1 tablet (40 mg total) by mouth once daily. 30 tablet 11    HYDROcodone-acetaminophen (NORCO)  mg per tablet Take 1 tablet by mouth every 8 (eight) hours as needed for Pain. 90 tablet 0    insulin aspart U-100 (NOVOLOG) 100 unit/mL (3 mL)  "InPn pen Inject 12 Units into the skin 3 (three) times daily with meals. Plus correction scale. Max TDD 40units. 15 mL 3    insulin detemir U-100 (LEVEMIR FLEXTOUCH U-100 INSULN) 100 unit/mL (3 mL) InPn pen Inject 20 Units into the skin 2 (two) times daily. 15 mL 3    ipratropium-albuteroL (COMBIVENT)  mcg/actuation inhaler Inhale 1 puff into the lungs by mouth every 6 (six) hours. 4 g 0    lancets 33 gauge Misc Use 4 (four) times daily. 200 each 0    fiujecypj-T4-rhW04-algal oil (METANX/FOLTANX RF) 3 mg-35 mg-2 mg -90.314 mg Cap Take 1 capsule by mouth once daily. For neuropathy in feet. 30 capsule 3    losartan (COZAAR) 50 MG tablet Take 1 tablet (50 mg total) by mouth once daily. 90 tablet 0    [START ON 5/12/2022] methocarbamoL (ROBAXIN) 500 MG Tab Take 1 tablet (500 mg total) by mouth 3 (three) times daily as needed (muscle spasm). 90 tablet 0    metoclopramide HCl (REGLAN) 5 MG tablet Take 1 tablet (5 mg total) by mouth 4 (four) times daily as needed (nausea, prevention). 30 tablet 3    metoprolol succinate (TOPROL-XL) 50 MG 24 hr tablet Take 1 tablet (50 mg total) by mouth nightly. 30 tablet 11    NIFEdipine (PROCARDIA-XL) 30 MG (OSM) 24 hr tablet Take 1 tablet (30 mg total) by mouth 2 (two) times a day. 180 tablet 0    nitroGLYCERIN (NITROSTAT) 0.4 MG SL tablet Place 1 tablet (0.4 mg total) under the tongue every 5 (five) minutes as needed for Chest pain. 25 tablet 0    pantoprazole (PROTONIX) 40 MG tablet Take 1 tablet (40 mg total) by mouth once daily. 30 tablet 0    pen needle, diabetic (BD ULTRA-FINE HIEN PEN NEEDLE) 32 gauge x 5/32" Ndle 1 each by Misc.(Non-Drug; Combo Route) route 4 (four) times daily. 400 each 2    [START ON 5/12/2022] pregabalin (LYRICA) 150 MG capsule Take 1 capsule (150 mg total) by mouth 3 (three) times daily. 90 capsule 2    promethazine (PHENERGAN) 25 MG tablet Take 1 tablet (25 mg total) by mouth every 6 (six) hours as needed for Nausea. 30 tablet 0    " senna-docusate 8.6-50 mg (PERICOLACE) 8.6-50 mg per tablet Take 1 tablet by mouth 2 (two) times daily as needed for Constipation. 60 tablet 0    sertraline (ZOLOFT) 100 MG tablet Take 1 tablet (100 mg total) by mouth once daily. 90 tablet 3    triamcinolone acetonide 0.1% (KENALOG) 0.1 % cream Apply to affected areas of body twice daily as needed rash. Do not use on face, underarms, or groin. 454 g 0    TRUEPLUS LANCETS 30 gauge Misc USE FOR TESTING FOUR TIMES DAILY 200 each 2     No current facility-administered medications for this visit.        History  Past Medical History:   Diagnosis Date    Arthritis     Back pain     Cancer     ovarian    Depression     Diabetes mellitus     Fibromyalgia     Hyperlipidemia     Hypertension     Lupus     Stroke     slight left sided weakness     Past Surgical History:   Procedure Laterality Date    APPENDECTOMY       SECTION      HYSTERECTOMY      INJECTION OF ANESTHETIC AGENT AROUND NERVE Bilateral 2021    Procedure: BLOCK, NERVE, SYMPATHIC;  Surgeon: Holden Pereira MD;  Location: [facility];  Service: Pain Management;  Laterality: Bilateral;    INJECTION OF ANESTHETIC AGENT AROUND NERVE N/A 2021    Procedure: BLOCK, NERVE, SYMPATHETIC  need consent;  Surgeon: Holden Pereira MD;  Location: [facility];  Service: Pain Management;  Laterality: N/A;    MS EVAL,SWALLOW FUNCTION,CINE/VIDEO RECORD  2021         TONSILLECTOMY       Social History     Socioeconomic History    Marital status:    Tobacco Use    Smoking status: Former Smoker     Quit date: 2020     Years since quittin.4    Smokeless tobacco: Never Used   Substance and Sexual Activity    Alcohol use: No    Drug use: No     Social Determinants of Health     Financial Resource Strain: Low Risk     Difficulty of Paying Living Expenses: Not hard at all   Food Insecurity: No Food Insecurity    Worried About Running Out of Food in the Last Year: Never  true    Ran Out of Food in the Last Year: Never true   Transportation Needs: No Transportation Needs    Lack of Transportation (Medical): No    Lack of Transportation (Non-Medical): No   Physical Activity: Insufficiently Active    Days of Exercise per Week: 3 days    Minutes of Exercise per Session: 20 min   Stress: No Stress Concern Present    Feeling of Stress : Not at all   Social Connections: Socially Isolated    Frequency of Communication with Friends and Family: More than three times a week    Frequency of Social Gatherings with Friends and Family: Never    Attends Adventism Services: Never    Active Member of Clubs or Organizations: No    Attends Club or Organization Meetings: Never    Marital Status:    Housing Stability: Low Risk     Unable to Pay for Housing in the Last Year: No    Number of Places Lived in the Last Year: 1    Unstable Housing in the Last Year: No     Family History   Problem Relation Age of Onset    COPD Mother     Lupus Mother     Hernia Mother     Uterine cancer Mother         vs cervical cancer    Ovarian cancer Mother     Diabetes Father     Coronary artery disease Father     Colon cancer Maternal Grandmother         in her 50's        Allergies  Review of patient's allergies indicates:   Allergen Reactions    Bleach (sodium hypochlorite) Shortness Of Breath    Nitrofurantoin macrocrystalline Anaphylaxis    Lipitor [atorvastatin] Diarrhea and Rash    Nsaids (non-steroidal anti-inflammatory drug)     Pcn [penicillins]     Toradol [ketorolac]        Review of Systems   Review of Systems   Constitutional: Negative for chills, fever and malaise/fatigue.   HENT: Negative for nosebleeds.    Eyes: Negative for blurred vision, double vision, vision loss in left eye and vision loss in right eye.   Cardiovascular: Positive for chest pain and dyspnea on exertion. Negative for claudication, irregular heartbeat, leg swelling, near-syncope, orthopnea,  palpitations, paroxysmal nocturnal dyspnea and syncope.   Respiratory: Positive for shortness of breath. Negative for cough, hemoptysis and wheezing.    Endocrine: Negative for cold intolerance and heat intolerance.   Hematologic/Lymphatic: Negative for bleeding problem. Does not bruise/bleed easily.   Skin: Positive for rash. Negative for color change.   Musculoskeletal: Positive for back pain and neck pain. Negative for falls, muscle weakness and myalgias.   Gastrointestinal: Negative for abdominal pain, heartburn, hematemesis, hematochezia, hemorrhoids, jaundice, melena, nausea and vomiting.   Genitourinary: Negative for dysuria, hematuria and nocturia.   Neurological: Negative for dizziness, focal weakness, headaches, light-headedness, loss of balance, numbness, vertigo and weakness.   Psychiatric/Behavioral: Negative for altered mental status, depression and memory loss. The patient is not nervous/anxious.    Allergic/Immunologic: Negative for hives and persistent infections.       Physical Exam  Vitals:    04/27/22 1541   BP: 132/80   Pulse: 95     Wt Readings from Last 1 Encounters:   04/27/22 81.4 kg (179 lb 8 oz)     Physical Exam  Vitals and nursing note reviewed.   Constitutional:       General: She is not in acute distress.     Appearance: She is obese. She is not toxic-appearing or diaphoretic.   HENT:      Head: Normocephalic and atraumatic.      Mouth/Throat:      Lips: Pink.      Mouth: Mucous membranes are moist.   Eyes:      General: No scleral icterus.     Conjunctiva/sclera: Conjunctivae normal.   Neck:      Thyroid: No thyromegaly.      Vascular: No carotid bruit, hepatojugular reflux or JVD.      Trachea: Trachea normal.   Cardiovascular:      Rate and Rhythm: Normal rate and regular rhythm.  No extrasystoles are present.     Chest Wall: PMI is not displaced.      Pulses:           Carotid pulses are 2+ on the right side and 2+ on the left side.       Radial pulses are 2+ on the right side and  2+ on the left side.      Heart sounds: S1 normal and S2 normal. No murmur heard.    No friction rub. No S3 or S4 sounds.      Comments: Jc test suggests patent palmar arch.  Pulmonary:      Effort: Pulmonary effort is normal. No accessory muscle usage or respiratory distress.      Breath sounds: Normal breath sounds and air entry. No decreased breath sounds, wheezing, rhonchi or rales.   Abdominal:      General: Bowel sounds are normal. There is no distension or abdominal bruit.      Palpations: Abdomen is soft. There is no hepatomegaly, splenomegaly or pulsatile mass.      Tenderness: There is no abdominal tenderness.   Musculoskeletal:         General: No tenderness or deformity.      Right lower leg: No edema.      Left lower leg: No edema.   Skin:     General: Skin is warm and dry.      Capillary Refill: Capillary refill takes less than 2 seconds.      Coloration: Skin is not cyanotic or pale.      Nails: There is no clubbing.   Neurological:      General: No focal deficit present.      Mental Status: She is alert and oriented to person, place, and time.   Psychiatric:         Attention and Perception: Attention normal.         Mood and Affect: Mood normal.         Speech: Speech normal.         Behavior: Behavior normal. Behavior is cooperative.         Labs  Lab Visit on 04/14/2022   Component Date Value Ref Range Status    Protein, Urine Random 04/14/2022 33 (A) 0 - 15 mg/dL Final    Creatinine, Urine 04/14/2022 89.0  15.0 - 325.0 mg/dL Final    Prot/Creat Ratio, Urine 04/14/2022 0.37 (A) 0.00 - 0.20 Final    Specimen UA 04/14/2022 Urine, Clean Catch   Final    Color, UA 04/14/2022 Yellow  Yellow, Straw, Aye Final    Appearance, UA 04/14/2022 Hazy (A) Clear Final    pH, UA 04/14/2022 6.0  5.0 - 8.0 Final    Specific Gravity, UA 04/14/2022 1.020  1.005 - 1.030 Final    Protein, UA 04/14/2022 Trace (A) Negative Final    Comment: Recommend a 24 hour urine protein or a urine   protein/creatinine  ratio if globulin induced proteinuria is  clinically suspected.      Glucose, UA 04/14/2022 3+ (A) Negative Final    Ketones, UA 04/14/2022 Negative  Negative Final    Bilirubin (UA) 04/14/2022 Negative  Negative Final    Occult Blood UA 04/14/2022 3+ (A) Negative Final    Nitrite, UA 04/14/2022 Negative  Negative Final    Urobilinogen, UA 04/14/2022 Negative  <2.0 EU/dL Final    Leukocytes, UA 04/14/2022 Trace (A) Negative Final    RBC, UA 04/14/2022 60 (A) 0 - 4 /hpf Final    WBC, UA 04/14/2022 35 (A) 0 - 5 /hpf Final    WBC Clumps, UA 04/14/2022 Few (A) None-Rare Final    Bacteria 04/14/2022 Moderate (A) None-Occ /hpf Final    Yeast, UA 04/14/2022 None  None Final    Squam Epithel, UA 04/14/2022 12  /hpf Final    Microscopic Comment 04/14/2022 SEE COMMENT   Final    Comment: Other formed elements not mentioned in the report are not   present in the microscopic examination.      Lab Visit on 04/14/2022   Component Date Value Ref Range Status    CRP 04/14/2022 9.0 (A) 0.0 - 8.2 mg/L Final    STEVE Screen 04/14/2022 Negative <1:80  Negative <1:80 Final    STEVE test was performed by Immunofluorescence on HEP2 cells.       Rheumatoid Factor 04/14/2022 <13.0  0.0 - 15.0 IU/mL Final    Anti-SSB Antibody 04/14/2022 0.08  0.00 - 0.99 Ratio Final    Anti-SSB Interpretation 04/14/2022 Negative  Negative Final    WBC 04/14/2022 14.42 (A) 3.90 - 12.70 K/uL Final    RBC 04/14/2022 4.21  4.00 - 5.40 M/uL Final    Hemoglobin 04/14/2022 12.1  12.0 - 16.0 g/dL Final    Hematocrit 04/14/2022 36.7 (A) 37.0 - 48.5 % Final    MCV 04/14/2022 87  82 - 98 fL Final    MCH 04/14/2022 28.7  27.0 - 31.0 pg Final    MCHC 04/14/2022 33.0  32.0 - 36.0 g/dL Final    RDW 04/14/2022 14.0  11.5 - 14.5 % Final    Platelets 04/14/2022 322  150 - 450 K/uL Final    MPV 04/14/2022 11.5  9.2 - 12.9 fL Final    Immature Granulocytes 04/14/2022 0.3  0.0 - 0.5 % Final    Gran # (ANC) 04/14/2022 8.7 (A) 1.8 - 7.7 K/uL Final     Immature Grans (Abs) 04/14/2022 0.05 (A) 0.00 - 0.04 K/uL Final    Comment: Mild elevation in immature granulocytes is non specific and   can be seen in a variety of conditions including stress response,   acute inflammation, trauma and pregnancy. Correlation with other   laboratory and clinical findings is essential.      Lymph # 04/14/2022 4.2  1.0 - 4.8 K/uL Final    Mono # 04/14/2022 0.8  0.3 - 1.0 K/uL Final    Eos # 04/14/2022 0.6 (A) 0.0 - 0.5 K/uL Final    Baso # 04/14/2022 0.08  0.00 - 0.20 K/uL Final    nRBC 04/14/2022 0  0 /100 WBC Final    Gran % 04/14/2022 60.4  38.0 - 73.0 % Final    Lymph % 04/14/2022 29.0  18.0 - 48.0 % Final    Mono % 04/14/2022 5.8  4.0 - 15.0 % Final    Eosinophil % 04/14/2022 3.9  0.0 - 8.0 % Final    Basophil % 04/14/2022 0.6  0.0 - 1.9 % Final    Differential Method 04/14/2022 Automated   Final    Sodium 04/14/2022 136  136 - 145 mmol/L Final    Potassium 04/14/2022 4.2  3.5 - 5.1 mmol/L Final    Chloride 04/14/2022 99  95 - 110 mmol/L Final    CO2 04/14/2022 21 (A) 23 - 29 mmol/L Final    Glucose 04/14/2022 523 (A) 70 - 110 mg/dL Final    Comment: GLU   critical result(s) called and verbal readback obtained from   Leigh Isbell RN by [facility] 04/14/2022 13:18      BUN 04/14/2022 29 (A) 8 - 23 mg/dL Final    Creatinine 04/14/2022 1.6 (A) 0.5 - 1.4 mg/dL Final    Calcium 04/14/2022 9.6  8.7 - 10.5 mg/dL Final    Total Protein 04/14/2022 7.9  6.0 - 8.4 g/dL Final    Albumin 04/14/2022 3.9  3.5 - 5.2 g/dL Final    Total Bilirubin 04/14/2022 0.2  0.1 - 1.0 mg/dL Final    Comment: For infants and newborns, interpretation of results should be based  on gestational age, weight and in agreement with clinical  observations.    Premature Infant recommended reference ranges:  Up to 24 hours.............<8.0 mg/dL  Up to 48 hours............<12.0 mg/dL  3-5 days..................<15.0 mg/dL  6-29 days.................<15.0 mg/dL      Alkaline Phosphatase 04/14/2022 110   55 - 135 U/L Final    AST 04/14/2022 12  10 - 40 U/L Final    ALT 04/14/2022 8 (A) 10 - 44 U/L Final    Anion Gap 04/14/2022 16  8 - 16 mmol/L Final    eGFR if  04/14/2022 36 (A) >60 mL/min/1.73 m^2 Final    eGFR if non  04/14/2022 31 (A) >60 mL/min/1.73 m^2 Final    Comment: Calculation used to obtain the estimated glomerular filtration  rate (eGFR) is the CKD-EPI equation.       CCP Antibodies 04/14/2022 <0.5  <5.0 U/mL Final    Complement (C-4) 04/14/2022 45 (A) 11 - 44 mg/dL Final    Complement (C-3) 04/14/2022 162  50 - 180 mg/dL Final    Anti-Histone Antibody 04/14/2022 0.3  0.0 - 0.9 Units Final    Comment: INTERPRETIVE INFORMATION: Histone Ab, IgG  0.9 Units or less ............ Negative  1.0 - 1.5 Units .............. Weak Positive  1.6 - 2.5 Units .............. Moderate Positive  2.6 Units or greater ......... Strong Positive  Performed By: [email or web address]  [address] [place] [id number]  : Jodi Vu MD      Sed Rate 04/14/2022 55 (A) 0 - 20 mm/Hr Final    Hep B S Ab 04/14/2022 Negative   Final    Individual is considered not immune to HBV infection.    Vit D, 25-Hydroxy 04/14/2022 34  30 - 96 ng/mL Final    Comment: Vitamin D deficiency.........<10 ng/mL                              Vitamin D insufficiency......10-29 ng/mL       Vitamin D sufficiency........> or equal to 30 ng/mL  Vitamin D toxicity............>100 ng/mL      TSH 04/14/2022 2.084  0.400 - 4.000 uIU/mL Final    Uric Acid 04/14/2022 6.4 (A) 2.4 - 5.7 mg/dL Final    Hepatitis C Ab 04/14/2022 Negative  Negative Final    Hepatitis B Surface Ag 04/14/2022 Negative  Negative Final    CPK 04/14/2022 40  20 - 180 U/L Final    Scleroderma SCL- 04/14/2022 7  <20 UNITS Final    Comment: Interpretation: Negative    Test performed at [facility],  [address], [place]  [address] [phone]  Jules Polo MD  - Medical Director       HIV 1/2 Ag/Ab 04/14/2022 Negative  Negative Final    IgG 1 04/14/2022 500  382 - 929 mg/dL Final    IgG 2 04/14/2022 294  242 - 700 mg/dL Final    IgG 3 04/14/2022 31  22 - 176 mg/dL Final    IgG 4 04/14/2022 88 (A) 4 - 86 mg/dL Final    Comment: Test performed at [facility],  [address] , [place] [id number]     [phone]  Jules Polo MD  - Medical Director      IgG 04/14/2022 997  650 - 1600 mg/dL Final    IgG Cord Blood Reference Range: 650-1600 mg/dL.    IgA 04/14/2022 443 (A) 40 - 350 mg/dL Final    IgA Cord Blood Reference Range: <5 mg/dL.    IgM 04/14/2022 116  50 - 300 mg/dL Final    IgM Cord Blood Reference Range: <25 mg/dL.    Protein, Serum 04/14/2022 7.7  6.0 - 8.4 g/dL Final    Comment: Serum protein electrophoresis and immunofixation results should be   interpreted in clinical context in that some therapeutic agents can   result   in false positive results (example, daratumumab). Correlation with   the   patient s therapeutic regimen is required.      Albumin 04/14/2022 4.37  3.35 - 5.55 g/dL Final    Alpha-1 04/14/2022 0.36  0.17 - 0.41 g/dL Final    Alpha-2 04/14/2022 1.01 (A) 0.43 - 0.99 g/dL Final    Beta 04/14/2022 1.08  0.50 - 1.10 g/dL Final    Gamma 04/14/2022 0.88  0.67 - 1.58 g/dL Final    Immunofix Interp. 04/14/2022 SEE COMMENT   Final    Comment: Serum protein electrophoresis and immunofixation results should be   interpreted in clinical context in that some therapeutic agents can   result   in false positive results (example, daratumumab). Correlation with   the   patient s therapeutic regimen is required.  See pathologist's interpretation.      PT Lupus Anticoagulant 04/14/2022 12.9  12.0 - 15.5 sec Final    PTT Lupus Anticoagulant 04/14/2022 39  32 - 48 sec Final    Thrombin Time 04/14/2022 Not Applicable  14.7 - 19.5 sec Final    Reptilase Time 04/14/2022 Not Applicable  <=21.9 sec Final    PTT Heparin Neutralized 04/14/2022 Not  Applicable  32 - 48 sec Final    PTT-LA Mix 04/14/2022 Not Applicable  32 - 48 sec Final    PLATELET NEUTRALIZATION APTT 04/14/2022 Not Applicable  Negative Final    DRVVT Screen 04/14/2022 37  33 - 44 sec Final    dRVVT Incubated 1:1 Mix 04/14/2022 Not Applicable  33 - 44 sec Final    dRVVT Confirm 04/14/2022 Not Applicable  Negative ratio Final    Hex Phosph Neut Test 04/14/2022 Not Applicable  Negative Final    Lupus Anticoagulant Interpretation 04/14/2022 See Note   Final    Comment: Lupus anticoagulant not detected.  The phospholipid-dependent screening tests (PTT, DRVVT) are   not prolonged.  Lupus anticoagulant antibodies are heterogeneous and   antibody titers fluctuate over time. Laboratory tests used   to identify lupus anticoagulants demonstrate variable   sensitivity. If there is strong clinical suspicion for   antiphospholipid antibody syndrome (APS), consider testing   for cardiolipin and beta-2 glycoprotein 1 antibodies (IgG   and IgM) if this testing has not already been performed.  Performed by [facility],                              [address] [place] [id number] [phone]                    [email or web address], Jodi Vu MD - Lab. Director      APA Isotype IgG 04/14/2022 <9.40  0.00 - 14.99 GPL Final    Interpretation: Absent    APA Isotype IgM 04/14/2022 18.30 (A) 0.00 - 12.49 MPL Final    Comment: Interpretation: Inconclusive    Test performed at [facility],  [address] [place]  [address] [phone]  Jules Polo MD  - Medical Director      Beta-2 Glyco 1 IgG 04/14/2022 <9  <=20 SGU Final    Beta-2 Glyco 1 IgM 04/14/2022 <9  <=20 SMU Final    Beta-2 Glyco 1 IgA 04/14/2022 <9  <=20 TROY Final    Comment: Test performed at [facility],  [address] [place]  [address] [phone]  Jules Polo MD  - Medical Director      Cytoplasmic Neutrophilic Ab 04/14/2022 <1:20  <1:20 Titer Final    Comment: Test performed  at [facility],  [address], [place]  [address]     [phone]  Jules Polo MD  - Medical Director      Perinuclear (P-ANCA) 04/14/2022 <1:20  <1:20 Titer Final    Cryoglobulin, Qual 04/14/2022 Absent  Absent Final    Comment: Test performed at [facility],  [address], [place]  [address]     [phone]  Jules Polo MD  - Medical Director      Hemoglobin A1C 04/14/2022 11.6 (A) 4.0 - 5.6 % Final    Comment: ADA Screening Guidelines:  5.7-6.4%  Consistent with prediabetes  >or=6.5%  Consistent with diabetes    High levels of fetal hemoglobin interfere with the HbA1C  assay. Heterozygous hemoglobin variants (HbS, HgC, etc)do  not significantly interfere with this assay.   However, presence of multiple variants may affect accuracy.      Estimated Avg Glucose 04/14/2022 286 (A) 68 - 131 mg/dL Final    Pathologist Interpretation BRI 04/14/2022 REVIEWED   Final    Comment:   Electronically reviewed and signed by:  Tangela Reyes MD  Signed on 04/20/22 at 15:22  No monoclonal peaks identified.      Pathologist Interpretation SPE 04/14/2022 REVIEWED   Final    Comment:   Electronically reviewed and signed by:  Tangela Reyes MD  Signed on 04/20/22 at 15:22  Normal total protein.  No discrete paraprotein bands identified.     Office Visit on 02/14/2022   Component Date Value Ref Range Status    DRUGS EXPECTED 02/14/2022 See Interp   Final    PAIN MANAGEMENT DRUG PANEL INTERP 02/14/2022 See Note   Final    Comment: ____________________________________________________________  ____  NO DRUGS PROVIDED  ____________________________________________________________  ____  Please call [facility] at   [phone] for Medical Director interpretation if   applicable. Alternatively, please consider the PAIN HYB U   ([id number]) which does not require medication information or   provide compliance  interpretation.  ____________________________________________________________  ____  INTERPRETIVE INFORMATION: Targeted drug profile Interp  Interpretation depends on accuracy and completeness of   patient medication information submitted by client.      CODEINE 02/14/2022 Not Detected   Final    MORPHINE 02/14/2022 Not Detected   Final    6-ACETYLMORPHINE 02/14/2022 Not Detected   Final    OXYCODONE 02/14/2022 Not Detected   Final    NOROYXCODONE 02/14/2022 Not Detected   Final    OXYMORPHONE 02/14/2022 Not Detected   Final    NOROXYMORPHONE 02/14/2022 Not Detected   Final    HYDROCODONE 02/14/2022 Present   Final    NORHYDROCODONE 02/14/2022 Present   Final    HYDROMORPHONE 02/14/2022 Present   Final    BUPRENORPHINE 02/14/2022 Not Detected   Final    NORUBPRENORPHINE 02/14/2022 Not Detected   Final    FENTANYL 02/14/2022 Not Detected   Final    NORFENTANYL 02/14/2022 Not Detected   Final    MEPERIDINE METABOLITE 02/14/2022 Not Detected   Final    TAPENTADOL 02/14/2022 Not Detected   Final    TAPENTADOL-O-SULF 02/14/2022 Not Detected   Final    METHADONE 02/14/2022 Not Detected   Final    TRAMADOL 02/14/2022 Not Detected   Final    AMPHETAMINE 02/14/2022 Not Detected   Final    METHAMPHETAMINE 02/14/2022 Not Detected   Final    MDMA- ECSTASY 02/14/2022 Not Detected   Final    MDA 02/14/2022 Not Detected   Final    MDEA- Mary 02/14/2022 Not Detected   Final    METHYLPHENIDATE 02/14/2022 Not Detected   Final    PHENTERMINE 02/14/2022 Not Detected   Final    BENZOYLECGONINE 02/14/2022 Not Detected   Final    ALPRAZOLAM 02/14/2022 Not Detected   Final    ALPHA-OH-ALPRAZOLAM 02/14/2022 Not Detected   Final    CLONAZEPAM 02/14/2022 Not Detected   Final    7-AMINOCLONAZEPAM 02/14/2022 Not Detected   Final    DIAZEPAM 02/14/2022 Not Detected   Final    NORDIAZEPAM 02/14/2022 Not Detected   Final    OXAZEPAM 02/14/2022 Not Detected   Final    TEMAZEPAM 02/14/2022 Not Detected   Final     Lorazepam 02/14/2022 Not Detected   Final    MIDAZOLAM 02/14/2022 Not Detected   Final    ZOLPIDEM 02/14/2022 Not Detected   Final    BARBITURATES 02/14/2022 Not Detected   Final    Creatinine, Urine 02/14/2022 62.7  20.0 - 400.0 mg/dL Final    ETHYL GLUCURONIDE 02/14/2022 Not Detected   Final    MARIJUANA METABOLITE 02/14/2022 Not Detected   Final    PCP 02/14/2022 Not Detected   Final    CARISOPRODOL 02/14/2022 Not Detected   Final    Comment: The carisoprodol immunoassay has cross-reactivity to   carisoprodol and meprobamate.      Naloxone 02/14/2022 Not Detected   Final    Gabapentin 02/14/2022 Not Detected   Final    Pregabalin 02/14/2022 Present   Final    Alpha-OH-Midazolam 02/14/2022 Not Detected   Final    Zolpidem Metabolite 02/14/2022 Not Detected   Final    PAIN MANAGEMENT DRUG PANEL 02/14/2022 See Below   Final    Comment: Methodology: Qualitative Enzyme Immunoassay and Qualitative   Liquid Chromatography-Tandem Mass Spectrometry,   Quantitative Spectrophotometry  The absence of expected drug(s) and/or drug metabolite(s)   may indicate non-compliance, inappropriate timing of   specimen collection relative to drug administration, poor   drug absorption, diluted/adulterated urine, or limitations   of testing. The concentration must be greater than or equal   to the cutoff to be reported as present.  If specific drug   concentrations are required, contact the laboratory within   two weeks of specimen collection to request quantification   by a second analytical technique. Interpretive questions   should be directed to the laboratory.  Results based on immunoassay detection that do not match   clinical expectations should be  interpreted with caution. Confirmatory testing by mass   spectrometry for immunoassay-based results is available, if   ordered within two weeks of specimen collection. Additional   kathrin                           ges apply.  For medical purposes only; not valid for  forensic use.  This test was developed and its performance characteristics   determined by [facility]. It has not been cleared or   approved by the [place] Food and Drug Administration. This test   was performed in a CLIA certified laboratory and is   intended for clinical purposes.      EER PAIN MGT PAN,HIGH RES/EMIT, IN* 02/14/2022 See Note   Final    Comment: Access [employer] Enhanced Report using the link below:    -Direct access:   [email or web address]  Performed By: [facility]  [address] [place] [id number]  : Jodi Vu MD     Admission on 12/21/2021, Discharged on 12/21/2021   Component Date Value Ref Range Status    POC Rapid COVID 12/21/2021 Negative  Negative Final     Acceptable 12/21/2021 Yes   Final    WBC 12/21/2021 16.53 (A) 3.90 - 12.70 K/uL Final    RBC 12/21/2021 3.79 (A) 4.00 - 5.40 M/uL Final    Hemoglobin 12/21/2021 10.7 (A) 12.0 - 16.0 g/dL Final    Hematocrit 12/21/2021 31.0 (A) 37.0 - 48.5 % Final    MCV 12/21/2021 82  82 - 98 fL Final    MCH 12/21/2021 28.2  27.0 - 31.0 pg Final    MCHC 12/21/2021 34.5  32.0 - 36.0 g/dL Final    RDW 12/21/2021 14.2  11.5 - 14.5 % Final    Platelets 12/21/2021 267  150 - 450 K/uL Final    MPV 12/21/2021 11.5  9.2 - 12.9 fL Final    Immature Granulocytes 12/21/2021 0.7 (A) 0.0 - 0.5 % Final    Gran # (ANC) 12/21/2021 12.0 (A) 1.8 - 7.7 K/uL Final    Immature Grans (Abs) 12/21/2021 0.12 (A) 0.00 - 0.04 K/uL Final    Comment: Mild elevation in immature granulocytes is non specific and   can be seen in a variety of conditions including stress response,   acute inflammation, trauma and pregnancy. Correlation with other   laboratory and clinical findings is essential.      Lymph # 12/21/2021 2.9  1.0 - 4.8 K/uL Final    Mono # 12/21/2021 1.1 (A) 0.3 - 1.0 K/uL Final    Eos # 12/21/2021 0.4  0.0 - 0.5 K/uL Final    Baso # 12/21/2021 0.04  0.00 - 0.20 K/uL Final     nRBC 12/21/2021 0  0 /100 WBC Final    Gran % 12/21/2021 72.8  38.0 - 73.0 % Final    Lymph % 12/21/2021 17.5 (A) 18.0 - 48.0 % Final    Mono % 12/21/2021 6.4  4.0 - 15.0 % Final    Eosinophil % 12/21/2021 2.4  0.0 - 8.0 % Final    Basophil % 12/21/2021 0.2  0.0 - 1.9 % Final    Differential Method 12/21/2021 Automated   Final    Sodium 12/21/2021 129 (A) 136 - 145 mmol/L Final    Potassium 12/21/2021 4.0  3.5 - 5.1 mmol/L Final    Specimen moderately hemolyzed    Chloride 12/21/2021 86 (A) 95 - 110 mmol/L Final    CO2 12/21/2021 25  23 - 29 mmol/L Final    Glucose 12/21/2021 142 (A) 70 - 110 mg/dL Final    BUN 12/21/2021 19  8 - 23 mg/dL Final    Creatinine 12/21/2021 1.3  0.5 - 1.4 mg/dL Final    Calcium 12/21/2021 8.8  8.7 - 10.5 mg/dL Final    Anion Gap 12/21/2021 18 (A) 8 - 16 mmol/L Final    eGFR if  12/21/2021 46 (A) >60 mL/min/1.73 m^2 Final    eGFR if non African American 12/21/2021 40 (A) >60 mL/min/1.73 m^2 Final    Comment: Calculation used to obtain the estimated glomerular filtration  rate (eGFR) is the CKD-EPI equation.      Lab Visit on 11/12/2021   Component Date Value Ref Range Status    Stool Culture 11/12/2021 No Salmonella,Shigella,Vibrio,Campylobacter,Yersinia isolated.   Final    Occult Blood 11/12/2021 Positive (A) Negative Final    Stool Exam-Ova,Cysts,Parasites 11/12/2021 FINAL 11/15/2021 1636   Final    Comment: SOURCE: STOOL  OVA AND PARASITE, MICROSCOPY, F                        FINAL    No parasites seen.   Cryptosporidium, Cyclospora, and microsporidia are not   readily detected by this method.   Single negative specimen does not rule out   parasitic infection.    Test Performed by:  [facility] - [address] [address]  : Jules Galicia M.D. Ph.D.; CLIA# [id number]      Stool WBC 11/12/2021 No neutrophils seen  No neutrophils seen Final    No fungal elements seen.    C. diff  Antigen 11/12/2021 Negative  Negative Final    C difficile Toxins A+B, EIA 11/12/2021 Negative  Negative Final    Testing not recommended for children <24 months old.    Shiga Toxin 1 E.coli 11/12/2021 Negative   Final    Shiga Toxin 2 E.coli 11/12/2021 Negative   Final       Imaging  No results found.    Assessment  1. Atherosclerosis of native coronary artery of native heart with stable angina pectoris  Ready to proceed with angiography    2. Essential hypertension  Controlled.  Unclear what she is actually taking.  She reports not taking nifedipine and losartan which are on her medication list.  Will need to clarify.    3. Hypercholesterolemia  Reassessment ordered.  Likely needs to be on a PCSK9 inhibitor    4. Type 2 diabetes mellitus with stage 3a chronic kidney disease, with long-term current use of insulin  Dr. Zapata    5. Severe obesity  Unchanged      Plan and Discussion    Plan for coronary angiography.  Start Clopidogrel in addition aspirin in advance of the procedure.  Start metoprolol 50 mg daily to help with blood pressure, heart rate, and angina.  Further planning based on results of angiography. The procedure, its risks, benefits and alternatives were discussed in detail.  All questions were answered.  Appropriate consents were signed.    The 10-year ASCVD risk score (Osiris DC Jr., et al., 2013) is: 41.2%    Values used to calculate the score:      Age: 77 years      Sex: Female      Is Non- : No      Diabetic: Yes      Tobacco smoker: No      Systolic Blood Pressure: 132 mmHg      Is BP treated: Yes      HDL Cholesterol: 38 mg/dL      Total Cholesterol: 186 mg/dL     Follow Up  After angiography      Jose L Méndez MD

## 2022-04-27 NOTE — PROGRESS NOTES
[facility]    Chief Complaint  Chief Complaint   Patient presents with    Coronary Artery Disease       HPI:    Patient finally presents for follow-up.  Was last seen in October when she was hospitalized with DKA.  Has recently been taking more nitroglycerin.  May take up to 3 per day.  Gets dyspneic and has chest discomfort when she exerts herself.  Relieved with rest and a single nitroglycerin.  Inquires about scheduling angiography that we discussed at her visit last September.    Medications  Current Outpatient Medications   Medication Sig Dispense Refill    acetaminophen (TYLENOL) 500 MG tablet Take 2 tablets (1,000 mg total) by mouth every 8 (eight) hours as needed for Pain.  0    aspirin (ECOTRIN) 81 MG EC tablet Take 1 tablet (81 mg total) by mouth once daily. 30 tablet 0    blood sugar diagnostic Strp 1 each by Misc.(Non-Drug; Combo Route) route 4 (four) times daily. 200 each 0    blood-glucose meter Misc Follow package directions (Patient taking differently: Follow package directions) 1 each 0    blood-glucose meter,continuous (DEXCOM G6 ) Misc 1 each by Misc.(Non-Drug; Combo Route) route continuous prn. 1 each PRN    blood-glucose sensor (DEXCOM G6 SENSOR) Nicole 3 each by Misc.(Non-Drug; Combo Route) route continuous prn. Change every 10 days. 3 each PRN    blood-glucose transmitter (DEXCOM G6 TRANSMITTER) Nicole 1 each by Misc.(Non-Drug; Combo Route) route continuous prn. 1 each PRN    cloNIDine (CATAPRES) 0.1 MG tablet Take 1 tablet (0.1 mg total) by mouth 2 (two) times daily. 180 tablet 1    clopidogreL (PLAVIX) 75 mg tablet Take 1 tablet (75 mg total) by mouth once daily. 30 tablet 11    furosemide (LASIX) 40 MG tablet Take 1 tablet (40 mg total) by mouth once daily. 30 tablet 11    HYDROcodone-acetaminophen (NORCO)  mg per tablet Take 1 tablet by mouth every 8 (eight) hours as needed for Pain. 90 tablet 0    insulin aspart U-100 (NOVOLOG) 100 unit/mL (3 mL)  "InPn pen Inject 12 Units into the skin 3 (three) times daily with meals. Plus correction scale. Max TDD 40units. 15 mL 3    insulin detemir U-100 (LEVEMIR FLEXTOUCH U-100 INSULN) 100 unit/mL (3 mL) InPn pen Inject 20 Units into the skin 2 (two) times daily. 15 mL 3    ipratropium-albuteroL (COMBIVENT)  mcg/actuation inhaler Inhale 1 puff into the lungs by mouth every 6 (six) hours. 4 g 0    lancets 33 gauge Misc Use 4 (four) times daily. 200 each 0    [id number]-algal oil (METANX/FOLTANX RF) 3 mg-35 mg-2 mg -90.314 mg Cap Take 1 capsule by mouth once daily. For neuropathy in feet. 30 capsule 3    losartan (COZAAR) 50 MG tablet Take 1 tablet (50 mg total) by mouth once daily. 90 tablet 0    [START ON 5/12/2022] methocarbamoL (ROBAXIN) 500 MG Tab Take 1 tablet (500 mg total) by mouth 3 (three) times daily as needed (muscle spasm). 90 tablet 0    metoclopramide HCl (REGLAN) 5 MG tablet Take 1 tablet (5 mg total) by mouth 4 (four) times daily as needed (nausea, prevention). 30 tablet 3    metoprolol succinate (TOPROL-XL) 50 MG 24 hr tablet Take 1 tablet (50 mg total) by mouth nightly. 30 tablet 11    NIFEdipine (PROCARDIA-XL) 30 MG (OSM) 24 hr tablet Take 1 tablet (30 mg total) by mouth 2 (two) times a day. 180 tablet 0    nitroGLYCERIN (NITROSTAT) 0.4 MG SL tablet Place 1 tablet (0.4 mg total) under the tongue every 5 (five) minutes as needed for Chest pain. 25 tablet 0    pantoprazole (PROTONIX) 40 MG tablet Take 1 tablet (40 mg total) by mouth once daily. 30 tablet 0    pen needle, diabetic (BD ULTRA-FINE HIEN PEN NEEDLE) 32 gauge x 5/32" Ndle 1 each by Misc.(Non-Drug; Combo Route) route 4 (four) times daily. 400 each 2    [START ON 5/12/2022] pregabalin (LYRICA) 150 MG capsule Take 1 capsule (150 mg total) by mouth 3 (three) times daily. 90 capsule 2    promethazine (PHENERGAN) 25 MG tablet Take 1 tablet (25 mg total) by mouth every 6 (six) hours as needed for Nausea. 30 tablet 0    " senna-docusate 8.6-50 mg (PERICOLACE) 8.6-50 mg per tablet Take 1 tablet by mouth 2 (two) times daily as needed for Constipation. 60 tablet 0    sertraline (ZOLOFT) 100 MG tablet Take 1 tablet (100 mg total) by mouth once daily. 90 tablet 3    triamcinolone acetonide 0.1% (KENALOG) 0.1 % cream Apply to affected areas of body twice daily as needed rash. Do not use on face, underarms, or groin. 454 g 0    TRUEPLUS LANCETS 30 gauge Misc USE FOR TESTING FOUR TIMES DAILY 200 each 2     No current facility-administered medications for this visit.        History  Past Medical History:   Diagnosis Date    Arthritis     Back pain     Cancer     ovarian    Depression     Diabetes mellitus     Fibromyalgia     Hyperlipidemia     Hypertension     Lupus     Stroke     slight left sided weakness     Past Surgical History:   Procedure Laterality Date    APPENDECTOMY       SECTION      HYSTERECTOMY      INJECTION OF ANESTHETIC AGENT AROUND NERVE Bilateral 2021    Procedure: BLOCK, NERVE, SYMPATHIC;  Surgeon: Holden Pereira MD;  Location: [facility];  Service: Pain Management;  Laterality: Bilateral;    INJECTION OF ANESTHETIC AGENT AROUND NERVE N/A 2021    Procedure: BLOCK, NERVE, SYMPATHETIC  need consent;  Surgeon: Holden Pereira MD;  Location: [facility];  Service: Pain Management;  Laterality: N/A;    CT EVAL,SWALLOW FUNCTION,CINE/VIDEO RECORD  2021         TONSILLECTOMY       Social History     Socioeconomic History    Marital status:    Tobacco Use    Smoking status: Former Smoker     Quit date: 2020     Years since quittin.4    Smokeless tobacco: Never Used   Substance and Sexual Activity    Alcohol use: No    Drug use: No     Social Determinants of Health     Financial Resource Strain: Low Risk     Difficulty of Paying Living Expenses: Not hard at all   Food Insecurity: No Food Insecurity    Worried About Running Out of Food in the Last Year: Never  true    Ran Out of Food in the Last Year: Never true   Transportation Needs: No Transportation Needs    Lack of Transportation (Medical): No    Lack of Transportation (Non-Medical): No   Physical Activity: Insufficiently Active    Days of Exercise per Week: 3 days    Minutes of Exercise per Session: 20 min   Stress: No Stress Concern Present    Feeling of Stress : Not at all   Social Connections: Socially Isolated    Frequency of Communication with Friends and Family: More than three times a week    Frequency of Social Gatherings with Friends and Family: Never    Attends Congregation Services: Never    Active Member of Clubs or Organizations: No    Attends Club or Organization Meetings: Never    Marital Status:    Housing Stability: Low Risk     Unable to Pay for Housing in the Last Year: No    Number of Places Lived in the Last Year: 1    Unstable Housing in the Last Year: No     Family History   Problem Relation Age of Onset    COPD Mother     Lupus Mother     Hernia Mother     Uterine cancer Mother         vs cervical cancer    Ovarian cancer Mother     Diabetes Father     Coronary artery disease Father     Colon cancer Maternal Grandmother         in her 50's        Allergies  Review of patient's allergies indicates:   Allergen Reactions    Bleach (sodium hypochlorite) Shortness Of Breath    Nitrofurantoin macrocrystalline Anaphylaxis    Lipitor [atorvastatin] Diarrhea and Rash    Nsaids (non-steroidal anti-inflammatory drug)     Pcn [penicillins]     Toradol [ketorolac]        Review of Systems   Review of Systems   Constitutional: Negative for chills, fever and malaise/fatigue.   HENT: Negative for nosebleeds.    Eyes: Negative for blurred vision, double vision, vision loss in left eye and vision loss in right eye.   Cardiovascular: Positive for chest pain and dyspnea on exertion. Negative for claudication, irregular heartbeat, leg swelling, near-syncope, orthopnea,  palpitations, paroxysmal nocturnal dyspnea and syncope.   Respiratory: Positive for shortness of breath. Negative for cough, hemoptysis and wheezing.    Endocrine: Negative for cold intolerance and heat intolerance.   Hematologic/Lymphatic: Negative for bleeding problem. Does not bruise/bleed easily.   Skin: Positive for rash. Negative for color change.   Musculoskeletal: Positive for back pain and neck pain. Negative for falls, muscle weakness and myalgias.   Gastrointestinal: Negative for abdominal pain, heartburn, hematemesis, hematochezia, hemorrhoids, jaundice, melena, nausea and vomiting.   Genitourinary: Negative for dysuria, hematuria and nocturia.   Neurological: Negative for dizziness, focal weakness, headaches, light-headedness, loss of balance, numbness, vertigo and weakness.   Psychiatric/Behavioral: Negative for altered mental status, depression and memory loss. The patient is not nervous/anxious.    Allergic/Immunologic: Negative for hives and persistent infections.       Physical Exam  Vitals:    04/27/22 1541   BP: 132/80   Pulse: 95     Wt Readings from Last 1 Encounters:   04/27/22 81.4 kg (179 lb 8 oz)     Physical Exam  Vitals and nursing note reviewed.   Constitutional:       General: She is not in acute distress.     Appearance: She is obese. She is not toxic-appearing or diaphoretic.   HENT:      Head: Normocephalic and atraumatic.      Mouth/Throat:      Lips: Pink.      Mouth: Mucous membranes are moist.   Eyes:      General: No scleral icterus.     Conjunctiva/sclera: Conjunctivae normal.   Neck:      Thyroid: No thyromegaly.      Vascular: No carotid bruit, hepatojugular reflux or JVD.      Trachea: Trachea normal.   Cardiovascular:      Rate and Rhythm: Normal rate and regular rhythm.  No extrasystoles are present.     Chest Wall: PMI is not displaced.      Pulses:           Carotid pulses are 2+ on the right side and 2+ on the left side.       Radial pulses are 2+ on the right side and  2+ on the left side.      Heart sounds: S1 normal and S2 normal. No murmur heard.    No friction rub. No S3 or S4 sounds.      Comments: Jc test suggests patent palmar arch.  Pulmonary:      Effort: Pulmonary effort is normal. No accessory muscle usage or respiratory distress.      Breath sounds: Normal breath sounds and air entry. No decreased breath sounds, wheezing, rhonchi or rales.   Abdominal:      General: Bowel sounds are normal. There is no distension or abdominal bruit.      Palpations: Abdomen is soft. There is no hepatomegaly, splenomegaly or pulsatile mass.      Tenderness: There is no abdominal tenderness.   Musculoskeletal:         General: No tenderness or deformity.      Right lower leg: No edema.      Left lower leg: No edema.   Skin:     General: Skin is warm and dry.      Capillary Refill: Capillary refill takes less than 2 seconds.      Coloration: Skin is not cyanotic or pale.      Nails: There is no clubbing.   Neurological:      General: No focal deficit present.      Mental Status: She is alert and oriented to person, place, and time.   Psychiatric:         Attention and Perception: Attention normal.         Mood and Affect: Mood normal.         Speech: Speech normal.         Behavior: Behavior normal. Behavior is cooperative.         Labs  Lab Visit on 04/14/2022   Component Date Value Ref Range Status    Protein, Urine Random 04/14/2022 33 (A) 0 - 15 mg/dL Final    Creatinine, Urine 04/14/2022 89.0  15.0 - 325.0 mg/dL Final    Prot/Creat Ratio, Urine 04/14/2022 0.37 (A) 0.00 - 0.20 Final    Specimen UA 04/14/2022 Urine, Clean Catch   Final    Color, UA 04/14/2022 Yellow  Yellow, Straw, Aye Final    Appearance, UA 04/14/2022 Hazy (A) Clear Final    pH, UA 04/14/2022 6.0  5.0 - 8.0 Final    Specific Gravity, UA 04/14/2022 1.020  1.005 - 1.030 Final    Protein, UA 04/14/2022 Trace (A) Negative Final    Comment: Recommend a 24 hour urine protein or a urine   protein/creatinine  ratio if globulin induced proteinuria is  clinically suspected.      Glucose, UA 04/14/2022 3+ (A) Negative Final    Ketones, UA 04/14/2022 Negative  Negative Final    Bilirubin (UA) 04/14/2022 Negative  Negative Final    Occult Blood UA 04/14/2022 3+ (A) Negative Final    Nitrite, UA 04/14/2022 Negative  Negative Final    Urobilinogen, UA 04/14/2022 Negative  <2.0 EU/dL Final    Leukocytes, UA 04/14/2022 Trace (A) Negative Final    RBC, UA 04/14/2022 60 (A) 0 - 4 /hpf Final    WBC, UA 04/14/2022 35 (A) 0 - 5 /hpf Final    WBC Clumps, UA 04/14/2022 Few (A) None-Rare Final    Bacteria 04/14/2022 Moderate (A) None-Occ /hpf Final    Yeast, UA 04/14/2022 None  None Final    Squam Epithel, UA 04/14/2022 12  /hpf Final    Microscopic Comment 04/14/2022 SEE COMMENT   Final    Comment: Other formed elements not mentioned in the report are not   present in the microscopic examination.      Lab Visit on 04/14/2022   Component Date Value Ref Range Status    CRP 04/14/2022 9.0 (A) 0.0 - 8.2 mg/L Final    STEVE Screen 04/14/2022 Negative <1:80  Negative <1:80 Final    STEVE test was performed by Immunofluorescence on HEP2 cells.       Rheumatoid Factor 04/14/2022 <13.0  0.0 - 15.0 IU/mL Final    Anti-SSB Antibody 04/14/2022 0.08  0.00 - 0.99 Ratio Final    Anti-SSB Interpretation 04/14/2022 Negative  Negative Final    WBC 04/14/2022 14.42 (A) 3.90 - 12.70 K/uL Final    RBC 04/14/2022 4.21  4.00 - 5.40 M/uL Final    Hemoglobin 04/14/2022 12.1  12.0 - 16.0 g/dL Final    Hematocrit 04/14/2022 36.7 (A) 37.0 - 48.5 % Final    MCV 04/14/2022 87  82 - 98 fL Final    MCH 04/14/2022 28.7  27.0 - 31.0 pg Final    MCHC 04/14/2022 33.0  32.0 - 36.0 g/dL Final    RDW 04/14/2022 14.0  11.5 - 14.5 % Final    Platelets 04/14/2022 322  150 - 450 K/uL Final    MPV 04/14/2022 11.5  9.2 - 12.9 fL Final    Immature Granulocytes 04/14/2022 0.3  0.0 - 0.5 % Final    Gran # (ANC) 04/14/2022 8.7 (A) 1.8 - 7.7 K/uL Final     Immature Grans (Abs) 04/14/2022 0.05 (A) 0.00 - 0.04 K/uL Final    Comment: Mild elevation in immature granulocytes is non specific and   can be seen in a variety of conditions including stress response,   acute inflammation, trauma and pregnancy. Correlation with other   laboratory and clinical findings is essential.      Lymph # 04/14/2022 4.2  1.0 - 4.8 K/uL Final    Mono # 04/14/2022 0.8  0.3 - 1.0 K/uL Final    Eos # 04/14/2022 0.6 (A) 0.0 - 0.5 K/uL Final    Baso # 04/14/2022 0.08  0.00 - 0.20 K/uL Final    nRBC 04/14/2022 0  0 /100 WBC Final    Gran % 04/14/2022 60.4  38.0 - 73.0 % Final    Lymph % 04/14/2022 29.0  18.0 - 48.0 % Final    Mono % 04/14/2022 5.8  4.0 - 15.0 % Final    Eosinophil % 04/14/2022 3.9  0.0 - 8.0 % Final    Basophil % 04/14/2022 0.6  0.0 - 1.9 % Final    Differential Method 04/14/2022 Automated   Final    Sodium 04/14/2022 136  136 - 145 mmol/L Final    Potassium 04/14/2022 4.2  3.5 - 5.1 mmol/L Final    Chloride 04/14/2022 99  95 - 110 mmol/L Final    CO2 04/14/2022 21 (A) 23 - 29 mmol/L Final    Glucose 04/14/2022 523 (A) 70 - 110 mg/dL Final    Comment: GLU   critical result(s) called and verbal readback obtained from   Leigh Isbell RN by [facility] 04/14/2022 13:18      BUN 04/14/2022 29 (A) 8 - 23 mg/dL Final    Creatinine 04/14/2022 1.6 (A) 0.5 - 1.4 mg/dL Final    Calcium 04/14/2022 9.6  8.7 - 10.5 mg/dL Final    Total Protein 04/14/2022 7.9  6.0 - 8.4 g/dL Final    Albumin 04/14/2022 3.9  3.5 - 5.2 g/dL Final    Total Bilirubin 04/14/2022 0.2  0.1 - 1.0 mg/dL Final    Comment: For infants and newborns, interpretation of results should be based  on gestational age, weight and in agreement with clinical  observations.    Premature Infant recommended reference ranges:  Up to 24 hours.............<8.0 mg/dL  Up to 48 hours............<12.0 mg/dL  3-5 days..................<15.0 mg/dL  6-29 days.................<15.0 mg/dL      Alkaline Phosphatase 04/14/2022 110   55 - 135 U/L Final    AST 04/14/2022 12  10 - 40 U/L Final    ALT 04/14/2022 8 (A) 10 - 44 U/L Final    Anion Gap 04/14/2022 16  8 - 16 mmol/L Final    eGFR if  04/14/2022 36 (A) >60 mL/min/1.73 m^2 Final    eGFR if non  04/14/2022 31 (A) >60 mL/min/1.73 m^2 Final    Comment: Calculation used to obtain the estimated glomerular filtration  rate (eGFR) is the CKD-EPI equation.       CCP Antibodies 04/14/2022 <0.5  <5.0 U/mL Final    Complement (C-4) 04/14/2022 45 (A) 11 - 44 mg/dL Final    Complement (C-3) 04/14/2022 162  50 - 180 mg/dL Final    Anti-Histone Antibody 04/14/2022 0.3  0.0 - 0.9 Units Final    Comment: INTERPRETIVE INFORMATION: Histone Ab, IgG  0.9 Units or less ............ Negative  1.0 - 1.5 Units .............. Weak Positive  1.6 - 2.5 Units .............. Moderate Positive  2.6 Units or greater ......... Strong Positive  Performed By: [facility]  [address] [place] [id number]  : Jodi Vu MD      Sed Rate 04/14/2022 55 (A) 0 - 20 mm/Hr Final    Hep B S Ab 04/14/2022 Negative   Final    Individual is considered not immune to HBV infection.    Vit D, 25-Hydroxy 04/14/2022 34  30 - 96 ng/mL Final    Comment: Vitamin D deficiency.........<10 ng/mL                              Vitamin D insufficiency......10-29 ng/mL       Vitamin D sufficiency........> or equal to 30 ng/mL  Vitamin D toxicity............>100 ng/mL      TSH 04/14/2022 2.084  0.400 - 4.000 uIU/mL Final    Uric Acid 04/14/2022 6.4 (A) 2.4 - 5.7 mg/dL Final    Hepatitis C Ab 04/14/2022 Negative  Negative Final    Hepatitis B Surface Ag 04/14/2022 Negative  Negative Final    CPK 04/14/2022 40  20 - 180 U/L Final    Scleroderma SCL- 04/14/2022 7  <20 UNITS Final    Comment: Interpretation: Negative    Test performed at [facility],  [address], [place]  [address] [phone]  Jules Polo MD  - Medical Director       HIV 1/2 Ag/Ab 04/14/2022 Negative  Negative Final    IgG 1 04/14/2022 500  382 - 929 mg/dL Final    IgG 2 04/14/2022 294  242 - 700 mg/dL Final    IgG 3 04/14/2022 31  22 - 176 mg/dL Final    IgG 4 04/14/2022 88 (A) 4 - 86 mg/dL Final    Comment: Test performed at [facility],  [address] , [place] [id number]     [phone]  Jules Polo MD  - Medical Director      IgG 04/14/2022 997  650 - 1600 mg/dL Final    IgG Cord Blood Reference Range: 650-1600 mg/dL.    IgA 04/14/2022 443 (A) 40 - 350 mg/dL Final    IgA Cord Blood Reference Range: <5 mg/dL.    IgM 04/14/2022 116  50 - 300 mg/dL Final    IgM Cord Blood Reference Range: <25 mg/dL.    Protein, Serum 04/14/2022 7.7  6.0 - 8.4 g/dL Final    Comment: Serum protein electrophoresis and immunofixation results should be   interpreted in clinical context in that some therapeutic agents can   result   in false positive results (example, daratumumab). Correlation with   the   patient s therapeutic regimen is required.      Albumin 04/14/2022 4.37  3.35 - 5.55 g/dL Final    Alpha-1 04/14/2022 0.36  0.17 - 0.41 g/dL Final    Alpha-2 04/14/2022 1.01 (A) 0.43 - 0.99 g/dL Final    Beta 04/14/2022 1.08  0.50 - 1.10 g/dL Final    Gamma 04/14/2022 0.88  0.67 - 1.58 g/dL Final    Immunofix Interp. 04/14/2022 SEE COMMENT   Final    Comment: Serum protein electrophoresis and immunofixation results should be   interpreted in clinical context in that some therapeutic agents can   result   in false positive results (example, daratumumab). Correlation with   the   patient s therapeutic regimen is required.  See pathologist's interpretation.      PT Lupus Anticoagulant 04/14/2022 12.9  12.0 - 15.5 sec Final    PTT Lupus Anticoagulant 04/14/2022 39  32 - 48 sec Final    Thrombin Time 04/14/2022 Not Applicable  14.7 - 19.5 sec Final    Reptilase Time 04/14/2022 Not Applicable  <=21.9 sec Final    PTT Heparin Neutralized 04/14/2022 Not  Applicable  32 - 48 sec Final    PTT-LA Mix 04/14/2022 Not Applicable  32 - 48 sec Final    PLATELET NEUTRALIZATION APTT 04/14/2022 Not Applicable  Negative Final    DRVVT Screen 04/14/2022 37  33 - 44 sec Final    dRVVT Incubated 1:1 Mix 04/14/2022 Not Applicable  33 - 44 sec Final    dRVVT Confirm 04/14/2022 Not Applicable  Negative ratio Final    Hex Phosph Neut Test 04/14/2022 Not Applicable  Negative Final    Lupus Anticoagulant Interpretation 04/14/2022 See Note   Final    Comment: Lupus anticoagulant not detected.  The phospholipid-dependent screening tests (PTT, DRVVT) are   not prolonged.  Lupus anticoagulant antibodies are heterogeneous and   antibody titers fluctuate over time. Laboratory tests used   to identify lupus anticoagulants demonstrate variable   sensitivity. If there is strong clinical suspicion for   antiphospholipid antibody syndrome (APS), consider testing   for cardiolipin and beta-2 glycoprotein 1 antibodies (IgG   and IgM) if this testing has not already been performed.  Performed by [facility],                              [address] [place] [id number] [phone]                    [email or web address], Jodi Vu MD - Lab. Director      APA Isotype IgG 04/14/2022 <9.40  0.00 - 14.99 GPL Final    Interpretation: Absent    APA Isotype IgM 04/14/2022 18.30 (A) 0.00 - 12.49 MPL Final    Comment: Interpretation: Inconclusive    Test performed at [facility],  [address] [place]  [address] [phone]  Jules Polo MD  - Medical Director      Beta-2 Glyco 1 IgG 04/14/2022 <9  <=20 SGU Final    Beta-2 Glyco 1 IgM 04/14/2022 <9  <=20 SMU Final    Beta-2 Glyco 1 IgA 04/14/2022 <9  <=20 TROY Final    Comment: Test performed at [facility],  [address] [place]  [address] [phone]  Jules Polo MD  - Medical Director      Cytoplasmic Neutrophilic Ab 04/14/2022 <1:20  <1:20 Titer Final    Comment: Test performed  at [facility],  [address], [place]  [address]     [phone]  Jules Polo MD  - Medical Director      Perinuclear (P-ANCA) 04/14/2022 <1:20  <1:20 Titer Final    Cryoglobulin, Qual 04/14/2022 Absent  Absent Final    Comment: Test performed at [facility],  [address], [place]  [address]     [phone]  Jules Polo MD  - Medical Director      Hemoglobin A1C 04/14/2022 11.6 (A) 4.0 - 5.6 % Final    Comment: ADA Screening Guidelines:  5.7-6.4%  Consistent with prediabetes  >or=6.5%  Consistent with diabetes    High levels of fetal hemoglobin interfere with the HbA1C  assay. Heterozygous hemoglobin variants (HbS, HgC, etc)do  not significantly interfere with this assay.   However, presence of multiple variants may affect accuracy.      Estimated Avg Glucose 04/14/2022 286 (A) 68 - 131 mg/dL Final    Pathologist Interpretation BRI 04/14/2022 REVIEWED   Final    Comment:   Electronically reviewed and signed by:  Tangela Reyes MD  Signed on 04/20/22 at 15:22  No monoclonal peaks identified.      Pathologist Interpretation SPE 04/14/2022 REVIEWED   Final    Comment:   Electronically reviewed and signed by:  Tangela Reyes MD  Signed on 04/20/22 at 15:22  Normal total protein.  No discrete paraprotein bands identified.     Office Visit on 02/14/2022   Component Date Value Ref Range Status    DRUGS EXPECTED 02/14/2022 See Interp   Final    PAIN MANAGEMENT DRUG PANEL INTERP 02/14/2022 See Note   Final    Comment: ____________________________________________________________  ____  NO DRUGS PROVIDED  ____________________________________________________________  ____  Please call [facility] at   [phone] for Medical Director interpretation if   applicable. Alternatively, please consider the PAIN HYB U   ([id number]) which does not require medication information or   provide compliance  interpretation.  ____________________________________________________________  ____  INTERPRETIVE INFORMATION: Targeted drug profile Interp  Interpretation depends on accuracy and completeness of   patient medication information submitted by client.      CODEINE 02/14/2022 Not Detected   Final    MORPHINE 02/14/2022 Not Detected   Final    6-ACETYLMORPHINE 02/14/2022 Not Detected   Final    OXYCODONE 02/14/2022 Not Detected   Final    NOROYXCODONE 02/14/2022 Not Detected   Final    OXYMORPHONE 02/14/2022 Not Detected   Final    NOROXYMORPHONE 02/14/2022 Not Detected   Final    HYDROCODONE 02/14/2022 Present   Final    NORHYDROCODONE 02/14/2022 Present   Final    HYDROMORPHONE 02/14/2022 Present   Final    BUPRENORPHINE 02/14/2022 Not Detected   Final    NORUBPRENORPHINE 02/14/2022 Not Detected   Final    FENTANYL 02/14/2022 Not Detected   Final    NORFENTANYL 02/14/2022 Not Detected   Final    MEPERIDINE METABOLITE 02/14/2022 Not Detected   Final    TAPENTADOL 02/14/2022 Not Detected   Final    TAPENTADOL-O-SULF 02/14/2022 Not Detected   Final    METHADONE 02/14/2022 Not Detected   Final    TRAMADOL 02/14/2022 Not Detected   Final    AMPHETAMINE 02/14/2022 Not Detected   Final    METHAMPHETAMINE 02/14/2022 Not Detected   Final    MDMA- ECSTASY 02/14/2022 Not Detected   Final    MDA 02/14/2022 Not Detected   Final    MDEA- Mary 02/14/2022 Not Detected   Final    METHYLPHENIDATE 02/14/2022 Not Detected   Final    PHENTERMINE 02/14/2022 Not Detected   Final    BENZOYLECGONINE 02/14/2022 Not Detected   Final    ALPRAZOLAM 02/14/2022 Not Detected   Final    ALPHA-OH-ALPRAZOLAM 02/14/2022 Not Detected   Final    CLONAZEPAM 02/14/2022 Not Detected   Final    7-AMINOCLONAZEPAM 02/14/2022 Not Detected   Final    DIAZEPAM 02/14/2022 Not Detected   Final    NORDIAZEPAM 02/14/2022 Not Detected   Final    OXAZEPAM 02/14/2022 Not Detected   Final    TEMAZEPAM 02/14/2022 Not Detected   Final     Lorazepam 02/14/2022 Not Detected   Final    MIDAZOLAM 02/14/2022 Not Detected   Final    ZOLPIDEM 02/14/2022 Not Detected   Final    BARBITURATES 02/14/2022 Not Detected   Final    Creatinine, Urine 02/14/2022 62.7  20.0 - 400.0 mg/dL Final    ETHYL GLUCURONIDE 02/14/2022 Not Detected   Final    MARIJUANA METABOLITE 02/14/2022 Not Detected   Final    PCP 02/14/2022 Not Detected   Final    CARISOPRODOL 02/14/2022 Not Detected   Final    Comment: The carisoprodol immunoassay has cross-reactivity to   carisoprodol and meprobamate.      Naloxone 02/14/2022 Not Detected   Final    Gabapentin 02/14/2022 Not Detected   Final    Pregabalin 02/14/2022 Present   Final    Alpha-OH-Midazolam 02/14/2022 Not Detected   Final    Zolpidem Metabolite 02/14/2022 Not Detected   Final    PAIN MANAGEMENT DRUG PANEL 02/14/2022 See Below   Final    Comment: Methodology: Qualitative Enzyme Immunoassay and Qualitative   Liquid Chromatography-Tandem Mass Spectrometry,   Quantitative Spectrophotometry  The absence of expected drug(s) and/or drug metabolite(s)   may indicate non-compliance, inappropriate timing of   specimen collection relative to drug administration, poor   drug absorption, diluted/adulterated urine, or limitations   of testing. The concentration must be greater than or equal   to the cutoff to be reported as present.  If specific drug   concentrations are required, contact the laboratory within   two weeks of specimen collection to request quantification   by a second analytical technique. Interpretive questions   should be directed to the laboratory.  Results based on immunoassay detection that do not match   clinical expectations should be  interpreted with caution. Confirmatory testing by mass   spectrometry for immunoassay-based results is available, if   ordered within two weeks of specimen collection. Additional   kathrin                           ges apply.  For medical purposes only; not valid for  forensic use.  This test was developed and its performance characteristics   determined by [facility]. It has not been cleared or   approved by the US Food and Drug Administration. This test   was performed in a CLIA certified laboratory and is   intended for clinical purposes.      EER PAIN MGT PAN,HIGH RES/EMIT, IN* 02/14/2022 See Note   Final    Comment: Access Spout Enhanced Report using the link below:    -Direct access:   [email or web address]  Performed By: [facility]  [address] [place] [id number]  : Jodi Vu MD     Admission on 12/21/2021, Discharged on 12/21/2021   Component Date Value Ref Range Status    POC Rapid COVID 12/21/2021 Negative  Negative Final     Acceptable 12/21/2021 Yes   Final    WBC 12/21/2021 16.53 (A) 3.90 - 12.70 K/uL Final    RBC 12/21/2021 3.79 (A) 4.00 - 5.40 M/uL Final    Hemoglobin 12/21/2021 10.7 (A) 12.0 - 16.0 g/dL Final    Hematocrit 12/21/2021 31.0 (A) 37.0 - 48.5 % Final    MCV 12/21/2021 82  82 - 98 fL Final    MCH 12/21/2021 28.2  27.0 - 31.0 pg Final    MCHC 12/21/2021 34.5  32.0 - 36.0 g/dL Final    RDW 12/21/2021 14.2  11.5 - 14.5 % Final    Platelets 12/21/2021 267  150 - 450 K/uL Final    MPV 12/21/2021 11.5  9.2 - 12.9 fL Final    Immature Granulocytes 12/21/2021 0.7 (A) 0.0 - 0.5 % Final    Gran # (ANC) 12/21/2021 12.0 (A) 1.8 - 7.7 K/uL Final    Immature Grans (Abs) 12/21/2021 0.12 (A) 0.00 - 0.04 K/uL Final    Comment: Mild elevation in immature granulocytes is non specific and   can be seen in a variety of conditions including stress response,   acute inflammation, trauma and pregnancy. Correlation with other   laboratory and clinical findings is essential.      Lymph # 12/21/2021 2.9  1.0 - 4.8 K/uL Final    Mono # 12/21/2021 1.1 (A) 0.3 - 1.0 K/uL Final    Eos # 12/21/2021 0.4  0.0 - 0.5 K/uL Final    Baso # 12/21/2021 0.04  0.00 - 0.20 K/uL Final     nRBC 12/21/2021 0  0 /100 WBC Final    Gran % 12/21/2021 72.8  38.0 - 73.0 % Final    Lymph % 12/21/2021 17.5 (A) 18.0 - 48.0 % Final    Mono % 12/21/2021 6.4  4.0 - 15.0 % Final    Eosinophil % 12/21/2021 2.4  0.0 - 8.0 % Final    Basophil % 12/21/2021 0.2  0.0 - 1.9 % Final    Differential Method 12/21/2021 Automated   Final    Sodium 12/21/2021 129 (A) 136 - 145 mmol/L Final    Potassium 12/21/2021 4.0  3.5 - 5.1 mmol/L Final    Specimen moderately hemolyzed    Chloride 12/21/2021 86 (A) 95 - 110 mmol/L Final    CO2 12/21/2021 25  23 - 29 mmol/L Final    Glucose 12/21/2021 142 (A) 70 - 110 mg/dL Final    BUN 12/21/2021 19  8 - 23 mg/dL Final    Creatinine 12/21/2021 1.3  0.5 - 1.4 mg/dL Final    Calcium 12/21/2021 8.8  8.7 - 10.5 mg/dL Final    Anion Gap 12/21/2021 18 (A) 8 - 16 mmol/L Final    eGFR if  12/21/2021 46 (A) >60 mL/min/1.73 m^2 Final    eGFR if non African American 12/21/2021 40 (A) >60 mL/min/1.73 m^2 Final    Comment: Calculation used to obtain the estimated glomerular filtration  rate (eGFR) is the CKD-EPI equation.      Lab Visit on 11/12/2021   Component Date Value Ref Range Status    Stool Culture 11/12/2021 No Salmonella,Shigella,Vibrio,Campylobacter,Yersinia isolated.   Final    Occult Blood 11/12/2021 Positive (A) Negative Final    Stool Exam-Ova,Cysts,Parasites 11/12/2021 FINAL 11/15/2021 1636   Final    Comment: SOURCE: STOOL  OVA AND PARASITE, MICROSCOPY, F                        FINAL    No parasites seen.   Cryptosporidium, Cyclospora, and microsporidia are not   readily detected by this method.   Single negative specimen does not rule out   parasitic infection.    Test Performed by:  [facility] - [address] [address]  : Jules Galicia M.D. Ph.D.; CLIA# [id number]      Stool WBC 11/12/2021 No neutrophils seen  No neutrophils seen Final    No fungal elements seen.    C. diff  Antigen 11/12/2021 Negative  Negative Final    C difficile Toxins A+B, EIA 11/12/2021 Negative  Negative Final    Testing not recommended for children <24 months old.    Shiga Toxin 1 E.coli 11/12/2021 Negative   Final    Shiga Toxin 2 E.coli 11/12/2021 Negative   Final       Imaging  No results found.    Assessment  1. Atherosclerosis of native coronary artery of native heart with stable angina pectoris  Ready to proceed with angiography    2. Essential hypertension  Controlled.  Unclear what she is actually taking.  She reports not taking nifedipine and losartan which are on her medication list.  Will need to clarify.    3. Hypercholesterolemia  Reassessment ordered.  Likely needs to be on a PCSK9 inhibitor    4. Type 2 diabetes mellitus with stage 3a chronic kidney disease, with long-term current use of insulin  Dr. Zapata    5. Severe obesity  Unchanged      Plan and Discussion    Plan for coronary angiography.  Start Clopidogrel in addition aspirin in advance of the procedure.  Start metoprolol 50 mg daily to help with blood pressure, heart rate, and angina.  Further planning based on results of angiography. The procedure, its risks, benefits and alternatives were discussed in detail.  All questions were answered.  Appropriate consents were signed.    The 10-year ASCVD risk score (Osiris DC Jr., et al., 2013) is: 41.2%    Values used to calculate the score:      Age: 77 years      Sex: Female      Is Non- : No      Diabetic: Yes      Tobacco smoker: No      Systolic Blood Pressure: 132 mmHg      Is BP treated: Yes      HDL Cholesterol: 38 mg/dL      Total Cholesterol: 186 mg/dL     Follow Up  After angiography      Jose L Méndez MD

## 2022-05-04 ENCOUNTER — HOSPITAL ENCOUNTER (INPATIENT)
Facility: OTHER | Age: 77
LOS: 2 days | Discharge: HOME | DRG: 682 | End: 2022-05-07
Attending: INTERNAL MEDICINE | Admitting: INTERNAL MEDICINE
Payer: MEDICARE

## 2022-05-04 DIAGNOSIS — I25.118 ATHEROSCLEROSIS OF NATIVE CORONARY ARTERY OF NATIVE HEART WITH STABLE ANGINA PECTORIS: ICD-10-CM

## 2022-05-04 DIAGNOSIS — I50.33 ACUTE ON CHRONIC DIASTOLIC CHF (CONGESTIVE HEART FAILURE): ICD-10-CM

## 2022-05-04 DIAGNOSIS — R07.9 CHEST PAIN: ICD-10-CM

## 2022-05-04 DIAGNOSIS — I25.84 CORONARY ARTERY CALCIFICATION: ICD-10-CM

## 2022-05-04 DIAGNOSIS — I25.10 CAD (CORONARY ARTERY DISEASE): ICD-10-CM

## 2022-05-04 DIAGNOSIS — I25.118 CORONARY ARTERY DISEASE OF NATIVE ARTERY OF NATIVE HEART WITH STABLE ANGINA PECTORIS: ICD-10-CM

## 2022-05-04 DIAGNOSIS — I25.10 CORONARY ARTERY CALCIFICATION: ICD-10-CM

## 2022-05-04 PROBLEM — N17.9 AKI (ACUTE KIDNEY INJURY): Status: ACTIVE | Noted: 2022-05-04

## 2022-05-04 LAB
ANION GAP SERPL CALC-SCNC: 12 MMOL/L (ref 8–16)
BACTERIA #/AREA URNS HPF: NORMAL /HPF
BASOPHILS # BLD AUTO: 0.06 K/UL (ref 0–0.2)
BASOPHILS NFR BLD: 0.6 % (ref 0–1.9)
BILIRUB UR QL STRIP: NEGATIVE
BUN SERPL-MCNC: 25 MG/DL (ref 8–23)
CALCIUM SERPL-MCNC: 8.8 MG/DL (ref 8.7–10.5)
CHLORIDE SERPL-SCNC: 97 MMOL/L (ref 95–110)
CLARITY UR: CLEAR
CO2 SERPL-SCNC: 26 MMOL/L (ref 23–29)
COLOR UR: YELLOW
CREAT SERPL-MCNC: 1.7 MG/DL (ref 0.5–1.4)
CTP QC/QA: YES
DIFFERENTIAL METHOD: ABNORMAL
EOSINOPHIL # BLD AUTO: 0.3 K/UL (ref 0–0.5)
EOSINOPHIL NFR BLD: 2.9 % (ref 0–8)
ERYTHROCYTE [DISTWIDTH] IN BLOOD BY AUTOMATED COUNT: 14 % (ref 11.5–14.5)
EST. GFR  (AFRICAN AMERICAN): 33 ML/MIN/1.73 M^2
EST. GFR  (NON AFRICAN AMERICAN): 29 ML/MIN/1.73 M^2
GLUCOSE SERPL-MCNC: 648 MG/DL (ref 70–110)
GLUCOSE UR QL STRIP: ABNORMAL
HCT VFR BLD AUTO: 34.4 % (ref 37–48.5)
HGB BLD-MCNC: 10.9 G/DL (ref 12–16)
HGB UR QL STRIP: ABNORMAL
IMM GRANULOCYTES # BLD AUTO: 0.07 K/UL (ref 0–0.04)
IMM GRANULOCYTES NFR BLD AUTO: 0.7 % (ref 0–0.5)
INR PPP: 0.9 (ref 0.8–1.2)
KETONES UR QL STRIP: NEGATIVE
LEUKOCYTE ESTERASE UR QL STRIP: NEGATIVE
LYMPHOCYTES # BLD AUTO: 2.8 K/UL (ref 1–4.8)
LYMPHOCYTES NFR BLD: 27.2 % (ref 18–48)
MCH RBC QN AUTO: 28.6 PG (ref 27–31)
MCHC RBC AUTO-ENTMCNC: 31.7 G/DL (ref 32–36)
MCV RBC AUTO: 90 FL (ref 82–98)
MICROSCOPIC COMMENT: NORMAL
MONOCYTES # BLD AUTO: 0.7 K/UL (ref 0.3–1)
MONOCYTES NFR BLD: 6.5 % (ref 4–15)
NEUTROPHILS # BLD AUTO: 6.3 K/UL (ref 1.8–7.7)
NEUTROPHILS NFR BLD: 62.1 % (ref 38–73)
NITRITE UR QL STRIP: NEGATIVE
NRBC BLD-RTO: 0 /100 WBC
PH UR STRIP: 6 [PH] (ref 5–8)
PLATELET # BLD AUTO: 255 K/UL (ref 150–450)
PMV BLD AUTO: 12.2 FL (ref 9.2–12.9)
POCT GLUCOSE: 100 MG/DL (ref 70–110)
POCT GLUCOSE: 231 MG/DL (ref 70–110)
POCT GLUCOSE: 368 MG/DL (ref 70–110)
POCT GLUCOSE: >500 MG/DL (ref 70–110)
POCT GLUCOSE: >500 MG/DL (ref 70–110)
POTASSIUM SERPL-SCNC: 4.6 MMOL/L (ref 3.5–5.1)
PROT UR QL STRIP: NEGATIVE
PROTHROMBIN TIME: 10.1 SEC (ref 9–12.5)
RBC # BLD AUTO: 3.81 M/UL (ref 4–5.4)
RBC #/AREA URNS HPF: 4 /HPF (ref 0–4)
SARS-COV-2 AG RESP QL IA.RAPID: NEGATIVE
SODIUM SERPL-SCNC: 135 MMOL/L (ref 136–145)
SP GR UR STRIP: 1.01 (ref 1–1.03)
URN SPEC COLLECT METH UR: ABNORMAL
UROBILINOGEN UR STRIP-ACNC: NEGATIVE EU/DL
WBC # BLD AUTO: 10.21 K/UL (ref 3.9–12.7)
YEAST URNS QL MICRO: NORMAL

## 2022-05-04 PROCEDURE — 25000003 PHARM REV CODE 250: Performed by: INTERNAL MEDICINE

## 2022-05-04 PROCEDURE — 93010 ELECTROCARDIOGRAM REPORT: CPT | Mod: ,,, | Performed by: INTERNAL MEDICINE

## 2022-05-04 PROCEDURE — 99220 PR INITIAL OBSERVATION CARE,LEVL III: CPT | Mod: ,,, | Performed by: INTERNAL MEDICINE

## 2022-05-04 PROCEDURE — 99220 PR INITIAL OBSERVATION CARE,LEVL III: ICD-10-PCS | Mod: ,,, | Performed by: INTERNAL MEDICINE

## 2022-05-04 PROCEDURE — C9399 UNCLASSIFIED DRUGS OR BIOLOG: HCPCS | Performed by: INTERNAL MEDICINE

## 2022-05-04 PROCEDURE — 63600175 PHARM REV CODE 636 W HCPCS: Performed by: INTERNAL MEDICINE

## 2022-05-04 PROCEDURE — 99499 UNLISTED E&M SERVICE: CPT | Mod: ,,, | Performed by: INTERNAL MEDICINE

## 2022-05-04 PROCEDURE — 93005 ELECTROCARDIOGRAM TRACING: CPT

## 2022-05-04 PROCEDURE — 82962 GLUCOSE BLOOD TEST: CPT | Performed by: INTERNAL MEDICINE

## 2022-05-04 PROCEDURE — 93010 EKG 12-LEAD: ICD-10-PCS | Mod: ,,, | Performed by: INTERNAL MEDICINE

## 2022-05-04 PROCEDURE — 85025 COMPLETE CBC W/AUTO DIFF WBC: CPT | Performed by: INTERNAL MEDICINE

## 2022-05-04 PROCEDURE — 81000 URINALYSIS NONAUTO W/SCOPE: CPT | Performed by: INTERNAL MEDICINE

## 2022-05-04 PROCEDURE — 80048 BASIC METABOLIC PNL TOTAL CA: CPT | Performed by: INTERNAL MEDICINE

## 2022-05-04 PROCEDURE — 96372 THER/PROPH/DIAG INJ SC/IM: CPT | Performed by: INTERNAL MEDICINE

## 2022-05-04 PROCEDURE — 36415 COLL VENOUS BLD VENIPUNCTURE: CPT | Performed by: INTERNAL MEDICINE

## 2022-05-04 PROCEDURE — G0378 HOSPITAL OBSERVATION PER HR: HCPCS

## 2022-05-04 PROCEDURE — 85610 PROTHROMBIN TIME: CPT | Performed by: INTERNAL MEDICINE

## 2022-05-04 PROCEDURE — 99499 NO LOS: ICD-10-PCS | Mod: ,,, | Performed by: INTERNAL MEDICINE

## 2022-05-04 RX ORDER — NITROGLYCERIN 0.4 MG/1
0.4 TABLET SUBLINGUAL EVERY 5 MIN PRN
Status: DISCONTINUED | OUTPATIENT
Start: 2022-05-04 | End: 2022-05-07 | Stop reason: HOSPADM

## 2022-05-04 RX ORDER — TALC
6 POWDER (GRAM) TOPICAL NIGHTLY PRN
Status: DISCONTINUED | OUTPATIENT
Start: 2022-05-04 | End: 2022-05-07 | Stop reason: HOSPADM

## 2022-05-04 RX ORDER — PANTOPRAZOLE SODIUM 40 MG/1
40 TABLET, DELAYED RELEASE ORAL DAILY
Status: DISCONTINUED | OUTPATIENT
Start: 2022-05-04 | End: 2022-05-07 | Stop reason: HOSPADM

## 2022-05-04 RX ORDER — METHOCARBAMOL 500 MG/1
500 TABLET, FILM COATED ORAL 3 TIMES DAILY PRN
Status: DISCONTINUED | OUTPATIENT
Start: 2022-05-04 | End: 2022-05-07 | Stop reason: HOSPADM

## 2022-05-04 RX ORDER — DIPHENHYDRAMINE HCL 25 MG
25 CAPSULE ORAL
Status: COMPLETED | OUTPATIENT
Start: 2022-05-04 | End: 2022-05-04

## 2022-05-04 RX ORDER — POLYETHYLENE GLYCOL 3350 17 G/17G
17 POWDER, FOR SOLUTION ORAL DAILY PRN
Status: DISCONTINUED | OUTPATIENT
Start: 2022-05-04 | End: 2022-05-07 | Stop reason: HOSPADM

## 2022-05-04 RX ORDER — INSULIN ASPART 100 [IU]/ML
12 INJECTION, SOLUTION INTRAVENOUS; SUBCUTANEOUS
Status: DISCONTINUED | OUTPATIENT
Start: 2022-05-04 | End: 2022-05-05

## 2022-05-04 RX ORDER — INSULIN ASPART 100 [IU]/ML
1-10 INJECTION, SOLUTION INTRAVENOUS; SUBCUTANEOUS
Status: DISCONTINUED | OUTPATIENT
Start: 2022-05-04 | End: 2022-05-07 | Stop reason: HOSPADM

## 2022-05-04 RX ORDER — METOPROLOL SUCCINATE 50 MG/1
50 TABLET, EXTENDED RELEASE ORAL NIGHTLY
Status: DISCONTINUED | OUTPATIENT
Start: 2022-05-04 | End: 2022-05-06

## 2022-05-04 RX ORDER — GLUCAGON 1 MG
1 KIT INJECTION
Status: DISCONTINUED | OUTPATIENT
Start: 2022-05-04 | End: 2022-05-07 | Stop reason: HOSPADM

## 2022-05-04 RX ORDER — PREGABALIN 75 MG/1
150 CAPSULE ORAL 2 TIMES DAILY
Status: DISCONTINUED | OUTPATIENT
Start: 2022-05-04 | End: 2022-05-07 | Stop reason: HOSPADM

## 2022-05-04 RX ORDER — ONDANSETRON 2 MG/ML
4 INJECTION INTRAMUSCULAR; INTRAVENOUS EVERY 6 HOURS PRN
Status: DISCONTINUED | OUTPATIENT
Start: 2022-05-04 | End: 2022-05-07 | Stop reason: HOSPADM

## 2022-05-04 RX ORDER — CLOPIDOGREL BISULFATE 75 MG/1
75 TABLET ORAL DAILY
Status: DISCONTINUED | OUTPATIENT
Start: 2022-05-04 | End: 2022-05-07 | Stop reason: HOSPADM

## 2022-05-04 RX ORDER — NIFEDIPINE 30 MG/1
30 TABLET, EXTENDED RELEASE ORAL 2 TIMES DAILY
Status: DISCONTINUED | OUTPATIENT
Start: 2022-05-04 | End: 2022-05-06

## 2022-05-04 RX ORDER — CLONIDINE HYDROCHLORIDE 0.1 MG/1
0.1 TABLET ORAL 2 TIMES DAILY
Status: DISCONTINUED | OUTPATIENT
Start: 2022-05-04 | End: 2022-05-06

## 2022-05-04 RX ORDER — SODIUM CHLORIDE 9 MG/ML
INJECTION, SOLUTION INTRAVENOUS CONTINUOUS
Status: DISCONTINUED | OUTPATIENT
Start: 2022-05-04 | End: 2022-05-05

## 2022-05-04 RX ORDER — DIAZEPAM 5 MG/1
5 TABLET ORAL
Status: COMPLETED | OUTPATIENT
Start: 2022-05-04 | End: 2022-05-04

## 2022-05-04 RX ORDER — IBUPROFEN 200 MG
16 TABLET ORAL
Status: DISCONTINUED | OUTPATIENT
Start: 2022-05-04 | End: 2022-05-07 | Stop reason: HOSPADM

## 2022-05-04 RX ORDER — SERTRALINE HYDROCHLORIDE 100 MG/1
100 TABLET, FILM COATED ORAL DAILY
Status: DISCONTINUED | OUTPATIENT
Start: 2022-05-04 | End: 2022-05-07 | Stop reason: HOSPADM

## 2022-05-04 RX ORDER — HYDROCODONE BITARTRATE AND ACETAMINOPHEN 10; 325 MG/1; MG/1
1 TABLET ORAL EVERY 8 HOURS PRN
Status: DISCONTINUED | OUTPATIENT
Start: 2022-05-04 | End: 2022-05-07 | Stop reason: HOSPADM

## 2022-05-04 RX ORDER — SODIUM CHLORIDE 0.9 % (FLUSH) 0.9 %
10 SYRINGE (ML) INJECTION EVERY 8 HOURS PRN
Status: DISCONTINUED | OUTPATIENT
Start: 2022-05-04 | End: 2022-05-07 | Stop reason: HOSPADM

## 2022-05-04 RX ORDER — NALOXONE HCL 0.4 MG/ML
0.02 VIAL (ML) INJECTION
Status: DISCONTINUED | OUTPATIENT
Start: 2022-05-04 | End: 2022-05-07 | Stop reason: HOSPADM

## 2022-05-04 RX ORDER — HEPARIN SODIUM 5000 [USP'U]/ML
5000 INJECTION, SOLUTION INTRAVENOUS; SUBCUTANEOUS EVERY 8 HOURS
Status: DISPENSED | OUTPATIENT
Start: 2022-05-04 | End: 2022-05-06

## 2022-05-04 RX ORDER — IBUPROFEN 200 MG
24 TABLET ORAL
Status: DISCONTINUED | OUTPATIENT
Start: 2022-05-04 | End: 2022-05-07 | Stop reason: HOSPADM

## 2022-05-04 RX ORDER — MAG HYDROX/ALUMINUM HYD/SIMETH 200-200-20
30 SUSPENSION, ORAL (FINAL DOSE FORM) ORAL 4 TIMES DAILY PRN
Status: DISCONTINUED | OUTPATIENT
Start: 2022-05-04 | End: 2022-05-07 | Stop reason: HOSPADM

## 2022-05-04 RX ORDER — ASPIRIN 81 MG/1
81 TABLET ORAL DAILY
Status: DISCONTINUED | OUTPATIENT
Start: 2022-05-04 | End: 2022-05-07 | Stop reason: HOSPADM

## 2022-05-04 RX ORDER — ACETAMINOPHEN 325 MG/1
650 TABLET ORAL EVERY 4 HOURS PRN
Status: DISCONTINUED | OUTPATIENT
Start: 2022-05-04 | End: 2022-05-07 | Stop reason: HOSPADM

## 2022-05-04 RX ADMIN — NIFEDIPINE 30 MG: 30 TABLET, FILM COATED, EXTENDED RELEASE ORAL at 10:05

## 2022-05-04 RX ADMIN — SODIUM CHLORIDE: 0.9 INJECTION, SOLUTION INTRAVENOUS at 10:05

## 2022-05-04 RX ADMIN — INSULIN ASPART 2 UNITS: 100 INJECTION, SOLUTION INTRAVENOUS; SUBCUTANEOUS at 10:05

## 2022-05-04 RX ADMIN — DIPHENHYDRAMINE HYDROCHLORIDE 25 MG: 25 CAPSULE ORAL at 08:05

## 2022-05-04 RX ADMIN — INSULIN DETEMIR 20 UNITS: 100 INJECTION, SOLUTION SUBCUTANEOUS at 10:05

## 2022-05-04 RX ADMIN — CLONIDINE HYDROCHLORIDE 0.1 MG: 0.1 TABLET ORAL at 10:05

## 2022-05-04 RX ADMIN — HYDROCODONE BITARTRATE AND ACETAMINOPHEN 1 TABLET: 10; 325 TABLET ORAL at 10:05

## 2022-05-04 RX ADMIN — SERTRALINE 100 MG: 100 TABLET, FILM COATED ORAL at 05:05

## 2022-05-04 RX ADMIN — ASPIRIN 81 MG: 81 TABLET, COATED ORAL at 05:05

## 2022-05-04 RX ADMIN — METOPROLOL SUCCINATE 50 MG: 50 TABLET, EXTENDED RELEASE ORAL at 10:05

## 2022-05-04 RX ADMIN — INSULIN ASPART 12 UNITS: 100 INJECTION, SOLUTION INTRAVENOUS; SUBCUTANEOUS at 01:05

## 2022-05-04 RX ADMIN — HEPARIN SODIUM 5000 UNITS: 5000 INJECTION INTRAVENOUS; SUBCUTANEOUS at 10:05

## 2022-05-04 RX ADMIN — INSULIN HUMAN 8 UNITS: 100 INJECTION, SOLUTION PARENTERAL at 10:05

## 2022-05-04 RX ADMIN — PREGABALIN 150 MG: 75 CAPSULE ORAL at 10:05

## 2022-05-04 RX ADMIN — HEPARIN SODIUM 5000 UNITS: 5000 INJECTION INTRAVENOUS; SUBCUTANEOUS at 01:05

## 2022-05-04 RX ADMIN — SODIUM CHLORIDE: 0.9 INJECTION, SOLUTION INTRAVENOUS at 09:05

## 2022-05-04 RX ADMIN — INSULIN ASPART 10 UNITS: 100 INJECTION, SOLUTION INTRAVENOUS; SUBCUTANEOUS at 12:05

## 2022-05-04 RX ADMIN — DIAZEPAM 5 MG: 5 TABLET ORAL at 08:05

## 2022-05-04 RX ADMIN — CLOPIDOGREL 75 MG: 75 TABLET, FILM COATED ORAL at 05:05

## 2022-05-04 RX ADMIN — PANTOPRAZOLE SODIUM 40 MG: 40 TABLET, DELAYED RELEASE ORAL at 05:05

## 2022-05-04 NOTE — INTERVAL H&P NOTE
The patient has been examined and the H&P has been reviewed:    I concur with the findings and changes have been noted since the H&P was written:  Her angina has improved with the addition of metoprolol and clopidogrel.  Feels better.  But, presented today with a blood sugar of 648.  Creatinine is increased to 1.7.  Baseline creatinine is about 1.0.  Risk of contrast induced nephropathy is much higher.  Best to admit for blood sugar control, rehydration and then coronary angiography.  Discussed with Dr. Scott.    Anesthesia/Surgery risks, benefits and alternative options discussed and understood by patient/family.          There are no hospital problems to display for this patient.

## 2022-05-04 NOTE — Clinical Note
The catheter was inserted into the and was repositioned into the ostium   right coronary artery. An angiography was performed of the right coronary arteries. Multiple views were taken.

## 2022-05-04 NOTE — HPI
76 y/o F with history of DM2, HTN, HLD, CAD, fibromyalgia, and chronic pain who presented for outpatient coronary angiography for evaluation of angina. She was found to have elevated creatinine and hyperglycemia so referred for admission and treatment. She received benadryl and diazepam prior to planned procedure. She is drowsy at the time of my evaluation so history obtained from her daughter at bedside. She reports compliance with home medications, including insulin but she has had uncontrolled blood glucose for the past several weeks (often >300). Daughter reports she has symptoms of hypoglycemia when her glucose is low/normal so she often runs high. Patient has complained of dry mouth and polyuria lately. Currently chest pain free. Patient complains of dysuria but reports this is chronic. Denies fever or chills. ROS limited

## 2022-05-04 NOTE — SUBJECTIVE & OBJECTIVE
Past Medical History:   Diagnosis Date    Arthritis     Back pain     Cancer     ovarian    Depression     Diabetes mellitus     Fibromyalgia     Hyperlipidemia     Hypertension     Lupus     Stroke     slight left sided weakness       Past Surgical History:   Procedure Laterality Date    APPENDECTOMY       SECTION      HYSTERECTOMY      INJECTION OF ANESTHETIC AGENT AROUND NERVE Bilateral 2021    Procedure: BLOCK, NERVE, SYMPATHIC;  Surgeon: Holden Pereira MD;  Location: [facility] PAIN [facility]GT;  Service: Pain Management;  Laterality: Bilateral;    INJECTION OF ANESTHETIC AGENT AROUND NERVE N/A 2021    Procedure: BLOCK, NERVE, SYMPATHETIC  need consent;  Surgeon: Holden Pereira MD;  Location: [facility] PAIN MGT;  Service: Pain Management;  Laterality: N/A;    HI EVAL,SWALLOW FUNCTION,CINE/VIDEO RECORD  2021         TONSILLECTOMY         Review of patient's allergies indicates:   Allergen Reactions    Bleach (sodium hypochlorite) Shortness Of Breath    Nitrofurantoin macrocrystalline Anaphylaxis    Lipitor [atorvastatin] Diarrhea and Rash    Nsaids (non-steroidal anti-inflammatory drug)     Pcn [penicillins]     Toradol [ketorolac]        No current facility-administered medications on file prior to encounter.     Current Outpatient Medications on File Prior to Encounter   Medication Sig    aspirin (ECOTRIN) 81 MG EC tablet Take 1 tablet (81 mg total) by mouth once daily.    cloNIDine (CATAPRES) 0.1 MG tablet Take 1 tablet (0.1 mg total) by mouth 2 (two) times daily.    furosemide (LASIX) 40 MG tablet Take 1 tablet (40 mg total) by mouth once daily.    HYDROcodone-acetaminophen (NORCO)  mg per tablet Take 1 tablet by mouth every 8 (eight) hours as needed for Pain.    insulin aspart U-100 (NOVOLOG) 100 unit/mL (3 mL) InPn pen Inject 12 Units into the skin 3 (three) times daily with meals. Plus correction scale. Max TDD 40units.    insulin detemir U-100 (LEVEMIR FLEXTOUCH U-100 INSULN) 100 unit/mL  (3 mL) InPn pen Inject 20 Units into the skin 2 (two) times daily.    [START ON 5/12/2022] methocarbamoL (ROBAXIN) 500 MG Tab Take 1 tablet (500 mg total) by mouth 3 (three) times daily as needed (muscle spasm).    metoclopramide HCl (REGLAN) 5 MG tablet Take 1 tablet (5 mg total) by mouth 4 (four) times daily as needed (nausea, prevention).    metoprolol succinate (TOPROL-XL) 50 MG 24 hr tablet Take 1 tablet (50 mg total) by mouth nightly.    nitroGLYCERIN (NITROSTAT) 0.4 MG SL tablet Place 1 tablet (0.4 mg total) under the tongue every 5 (five) minutes as needed for Chest pain.    [START ON 5/12/2022] pregabalin (LYRICA) 150 MG capsule Take 1 capsule (150 mg total) by mouth 3 (three) times daily.    senna-docusate 8.6-50 mg (PERICOLACE) 8.6-50 mg per tablet Take 1 tablet by mouth 2 (two) times daily as needed for Constipation.    sertraline (ZOLOFT) 100 MG tablet Take 1 tablet (100 mg total) by mouth once daily.    acetaminophen (TYLENOL) 500 MG tablet Take 2 tablets (1,000 mg total) by mouth every 8 (eight) hours as needed for Pain.    blood sugar diagnostic Strp 1 each by Misc.(Non-Drug; Combo Route) route 4 (four) times daily.    blood-glucose meter Misc Follow package directions (Patient taking differently: Follow package directions)    blood-glucose meter,continuous (DEXCOM G6 ) Misc 1 each by Misc.(Non-Drug; Combo Route) route continuous prn.    blood-glucose sensor (DEXCOM G6 SENSOR) Nicole 3 each by Misc.(Non-Drug; Combo Route) route continuous prn. Change every 10 days.    blood-glucose transmitter (DEXCOM G6 TRANSMITTER) Nicole 1 each by Misc.(Non-Drug; Combo Route) route continuous prn.    clopidogreL (PLAVIX) 75 mg tablet Take 1 tablet (75 mg total) by mouth once daily.    ipratropium-albuteroL (COMBIVENT)  mcg/actuation inhaler Inhale 1 puff into the lungs by mouth every 6 (six) hours.    lancets 33 gauge Misc Use 4 (four) times daily.    mopzumulw-M7-[id number]-algal oil (METANX/FOLTANX RF) 3  "mg-35 mg-2 mg -90.314 mg Cap Take 1 capsule by mouth once daily. For neuropathy in feet.    losartan (COZAAR) 50 MG tablet Take 1 tablet (50 mg total) by mouth once daily.    NIFEdipine (PROCARDIA-XL) 30 MG (OSM) 24 hr tablet Take 1 tablet (30 mg total) by mouth 2 (two) times a day.    pantoprazole (PROTONIX) 40 MG tablet Take 1 tablet (40 mg total) by mouth once daily.    pen needle, diabetic (BD ULTRA-FINE HIEN PEN NEEDLE) 32 gauge x 5/32" Ndle 1 each by Misc.(Non-Drug; Combo Route) route 4 (four) times daily.    promethazine (PHENERGAN) 25 MG tablet Take 1 tablet (25 mg total) by mouth every 6 (six) hours as needed for Nausea.    triamcinolone acetonide 0.1% (KENALOG) 0.1 % cream Apply to affected areas of body twice daily as needed rash. Do not use on face, underarms, or groin.    TRUEPLUS LANCETS 30 gauge Misc USE FOR TESTING FOUR TIMES DAILY     Family History       Problem Relation (Age of Onset)    COPD Mother    Colon cancer Maternal Grandmother    Coronary artery disease Father    Diabetes Father    Hernia Mother    Lupus Mother    Ovarian cancer Mother    Uterine cancer Mother          Tobacco Use    Smoking status: Former Smoker     Quit date: 2020     Years since quittin.5    Smokeless tobacco: Never Used   Substance and Sexual Activity    Alcohol use: No    Drug use: No    Sexual activity: Not on file     Review of Systems   Constitutional:  Negative for chills and fever.   Respiratory:  Negative for shortness of breath.    Cardiovascular:  Negative for chest pain and leg swelling.   Gastrointestinal:  Negative for nausea and vomiting.   Genitourinary:  Positive for dysuria and frequency.   Objective:     Vital Signs (Most Recent):  Temp: 97.5 °F (36.4 °C) (22 1145)  Pulse: (!) 59 (22 1145)  Resp: 18 (22 1145)  BP: (!) 165/71 (22 1145)  SpO2: 97 % (22 1145)   Vital Signs (24h Range):  Temp:  [97.5 °F (36.4 °C)-97.7 °F (36.5 °C)] 97.5 °F (36.4 °C)  Pulse:  [59-93] " 59  Resp:  [18] 18  SpO2:  [93 %-97 %] 97 %  BP: (151-165)/(71) 165/71     Weight: 81.4 kg (179 lb 8 oz)  Body mass index is 35.06 kg/m².    Physical Exam  Vitals and nursing note reviewed.   Constitutional:       General: She is not in acute distress.     Appearance: Normal appearance. She is well-developed. She is obese.      Comments: Drowsy but awakens to voice   HENT:      Head: Normocephalic and atraumatic.   Eyes:      General:         Right eye: No discharge.         Left eye: No discharge.      Conjunctiva/sclera: Conjunctivae normal.   Neck:      Vascular: No JVD.   Cardiovascular:      Rate and Rhythm: Normal rate and regular rhythm.      Pulses: Normal pulses.   Pulmonary:      Effort: Pulmonary effort is normal. No respiratory distress.      Breath sounds: Normal breath sounds. No wheezing or rales.   Abdominal:      General: Bowel sounds are normal. There is no distension.      Palpations: Abdomen is soft.      Tenderness: There is no abdominal tenderness.   Musculoskeletal:      Cervical back: Neck supple.      Right lower leg: No edema.      Left lower leg: No edema.   Skin:     General: Skin is warm and dry.   Neurological:      Mental Status: She is oriented to person, place, and time.           Significant Labs: All pertinent labs within the past 24 hours have been reviewed.  CBC:   Recent Labs   Lab 05/04/22  0821   WBC 10.21   HGB 10.9*   HCT 34.4*        CMP:   Recent Labs   Lab 05/04/22  0821   *   K 4.6   CL 97   CO2 26   *   BUN 25*   CREATININE 1.7*   CALCIUM 8.8   ANIONGAP 12   EGFRNONAA 29*       Significant Imaging: I have reviewed all pertinent imaging results/findings within the past 24 hours.

## 2022-05-04 NOTE — ASSESSMENT & PLAN NOTE
- Planned for LHC today but cancelled due to hyperglycemia and RANJEET  - Continue aspirin, plavix, metoprolol  - Nitroglycerin PRN  - NPO after midnight in case LHC can be done  - Cardiology following

## 2022-05-04 NOTE — ASSESSMENT & PLAN NOTE
Chronic pain   - Continue home regimen with norco 10/325 mg q8h PRN, methocarbamol 500 mg q8h PRN and lyrica - renally dose at 150 mg BID

## 2022-05-04 NOTE — H&P
[facility]  History & Physical    Patient Name: Indira Waters  MRN: [id number]  Patient Class: OP- Observation  Admission Date: 2022  Attending Physician: Keila Chan MD   Primary Care Provider: Chun Zapata MD         Patient information was obtained from patient, relative(s) and past medical records.     Subjective:     Principal Problem:Type 2 diabetes mellitus with hyperglycemia    Chief Complaint:   Chief Complaint   Patient presents with    Hyperglycemia        HPI: 78 y/o F with history of DM2, HTN, HLD, CAD, fibromyalgia, and chronic pain who presented for outpatient coronary angiography for evaluation of angina. She was found to have elevated creatinine and hyperglycemia so referred for admission and treatment. She received benadryl and diazepam prior to planned procedure. She is drowsy at the time of my evaluation so history obtained from her daughter at bedside. She reports compliance with home medications, including insulin but she has had uncontrolled blood glucose for the past several weeks (often >300). Daughter reports she has symptoms of hypoglycemia when her glucose is low/normal so she often runs high. Patient has complained of dry mouth and polyuria lately. Currently chest pain free. Patient complains of dysuria but reports this is chronic. Denies fever or chills. ROS limited       Past Medical History:   Diagnosis Date    Arthritis     Back pain     Cancer     ovarian    Depression     Diabetes mellitus     Fibromyalgia     Hyperlipidemia     Hypertension     Lupus     Stroke     slight left sided weakness       Past Surgical History:   Procedure Laterality Date    APPENDECTOMY       SECTION      HYSTERECTOMY      INJECTION OF ANESTHETIC AGENT AROUND NERVE Bilateral 2021    Procedure: BLOCK, NERVE, SYMPATHIC;  Surgeon: Holden Pereira MD;  Location: [facility];  Service: Pain Management;  Laterality: Bilateral;     INJECTION OF ANESTHETIC AGENT AROUND NERVE N/A 8/25/2021    Procedure: BLOCK, NERVE, SYMPATHETIC  need consent;  Surgeon: Holden Pereira MD;  Location: [facility];  Service: Pain Management;  Laterality: N/A;    MD EVAL,SWALLOW FUNCTION,CINE/VIDEO RECORD  9/9/2021         TONSILLECTOMY         Review of patient's allergies indicates:   Allergen Reactions    Bleach (sodium hypochlorite) Shortness Of Breath    Nitrofurantoin macrocrystalline Anaphylaxis    Lipitor [atorvastatin] Diarrhea and Rash    Nsaids (non-steroidal anti-inflammatory drug)     Pcn [penicillins]     Toradol [ketorolac]        No current facility-administered medications on file prior to encounter.     Current Outpatient Medications on File Prior to Encounter   Medication Sig    aspirin (ECOTRIN) 81 MG EC tablet Take 1 tablet (81 mg total) by mouth once daily.    cloNIDine (CATAPRES) 0.1 MG tablet Take 1 tablet (0.1 mg total) by mouth 2 (two) times daily.    furosemide (LASIX) 40 MG tablet Take 1 tablet (40 mg total) by mouth once daily.    HYDROcodone-acetaminophen (NORCO)  mg per tablet Take 1 tablet by mouth every 8 (eight) hours as needed for Pain.    insulin aspart U-100 (NOVOLOG) 100 unit/mL (3 mL) InPn pen Inject 12 Units into the skin 3 (three) times daily with meals. Plus correction scale. Max TDD 40units.    insulin detemir U-100 (LEVEMIR FLEXTOUCH U-100 INSULN) 100 unit/mL (3 mL) InPn pen Inject 20 Units into the skin 2 (two) times daily.    [START ON 5/12/2022] methocarbamoL (ROBAXIN) 500 MG Tab Take 1 tablet (500 mg total) by mouth 3 (three) times daily as needed (muscle spasm).    metoclopramide HCl (REGLAN) 5 MG tablet Take 1 tablet (5 mg total) by mouth 4 (four) times daily as needed (nausea, prevention).    metoprolol succinate (TOPROL-XL) 50 MG 24 hr tablet Take 1 tablet (50 mg total) by mouth nightly.    nitroGLYCERIN (NITROSTAT) 0.4 MG SL tablet Place 1 tablet (0.4 mg total) under the tongue  "every 5 (five) minutes as needed for Chest pain.    [START ON 5/12/2022] pregabalin (LYRICA) 150 MG capsule Take 1 capsule (150 mg total) by mouth 3 (three) times daily.    senna-docusate 8.6-50 mg (PERICOLACE) 8.6-50 mg per tablet Take 1 tablet by mouth 2 (two) times daily as needed for Constipation.    sertraline (ZOLOFT) 100 MG tablet Take 1 tablet (100 mg total) by mouth once daily.    acetaminophen (TYLENOL) 500 MG tablet Take 2 tablets (1,000 mg total) by mouth every 8 (eight) hours as needed for Pain.    blood sugar diagnostic Strp 1 each by Misc.(Non-Drug; Combo Route) route 4 (four) times daily.    blood-glucose meter Misc Follow package directions (Patient taking differently: Follow package directions)    blood-glucose meter,continuous (DEXCOM G6 ) Misc 1 each by Misc.(Non-Drug; Combo Route) route continuous prn.    blood-glucose sensor (DEXCOM G6 SENSOR) Nicole 3 each by Misc.(Non-Drug; Combo Route) route continuous prn. Change every 10 days.    blood-glucose transmitter (DEXCOM G6 TRANSMITTER) Nicole 1 each by Misc.(Non-Drug; Combo Route) route continuous prn.    clopidogreL (PLAVIX) 75 mg tablet Take 1 tablet (75 mg total) by mouth once daily.    ipratropium-albuteroL (COMBIVENT)  mcg/actuation inhaler Inhale 1 puff into the lungs by mouth every 6 (six) hours.    lancets 33 gauge Misc Use 4 (four) times daily.    fgslngoit-H6-bmP89-algal oil (METANX/FOLTANX RF) 3 mg-35 mg-2 mg -90.314 mg Cap Take 1 capsule by mouth once daily. For neuropathy in feet.    losartan (COZAAR) 50 MG tablet Take 1 tablet (50 mg total) by mouth once daily.    NIFEdipine (PROCARDIA-XL) 30 MG (OSM) 24 hr tablet Take 1 tablet (30 mg total) by mouth 2 (two) times a day.    pantoprazole (PROTONIX) 40 MG tablet Take 1 tablet (40 mg total) by mouth once daily.    pen needle, diabetic (BD ULTRA-FINE HIEN PEN NEEDLE) 32 gauge x 5/32" Ndle 1 each by Misc.(Non-Drug; Combo Route) route 4 (four) times daily.    " promethazine (PHENERGAN) 25 MG tablet Take 1 tablet (25 mg total) by mouth every 6 (six) hours as needed for Nausea.    triamcinolone acetonide 0.1% (KENALOG) 0.1 % cream Apply to affected areas of body twice daily as needed rash. Do not use on face, underarms, or groin.    TRUEPLUS LANCETS 30 gauge Misc USE FOR TESTING FOUR TIMES DAILY     Family History       Problem Relation (Age of Onset)    COPD Mother    Colon cancer Maternal Grandmother    Coronary artery disease Father    Diabetes Father    Hernia Mother    Lupus Mother    Ovarian cancer Mother    Uterine cancer Mother          Tobacco Use    Smoking status: Former Smoker     Quit date: 2020     Years since quittin.5    Smokeless tobacco: Never Used   Substance and Sexual Activity    Alcohol use: No    Drug use: No    Sexual activity: Not on file     Review of Systems   Constitutional:  Negative for chills and fever.   Respiratory:  Negative for shortness of breath.    Cardiovascular:  Negative for chest pain and leg swelling.   Gastrointestinal:  Negative for nausea and vomiting.   Genitourinary:  Positive for dysuria and frequency.   Objective:     Vital Signs (Most Recent):  Temp: 97.5 °F (36.4 °C) (22 1145)  Pulse: (!) 59 (22 1145)  Resp: 18 (22 1145)  BP: (!) 165/71 (22 1145)  SpO2: 97 % (22 1145)   Vital Signs (24h Range):  Temp:  [97.5 °F (36.4 °C)-97.7 °F (36.5 °C)] 97.5 °F (36.4 °C)  Pulse:  [59-93] 59  Resp:  [18] 18  SpO2:  [93 %-97 %] 97 %  BP: (151-165)/(71) 165/71     Weight: 81.4 kg (179 lb 8 oz)  Body mass index is 35.06 kg/m².    Physical Exam  Vitals and nursing note reviewed.   Constitutional:       General: She is not in acute distress.     Appearance: Normal appearance. She is well-developed. She is obese.      Comments: Drowsy but awakens to voice   HENT:      Head: Normocephalic and atraumatic.   Eyes:      General:         Right eye: No discharge.         Left eye: No discharge.       Conjunctiva/sclera: Conjunctivae normal.   Neck:      Vascular: No JVD.   Cardiovascular:      Rate and Rhythm: Normal rate and regular rhythm.      Pulses: Normal pulses.   Pulmonary:      Effort: Pulmonary effort is normal. No respiratory distress.      Breath sounds: Normal breath sounds. No wheezing or rales.   Abdominal:      General: Bowel sounds are normal. There is no distension.      Palpations: Abdomen is soft.      Tenderness: There is no abdominal tenderness.   Musculoskeletal:      Cervical back: Neck supple.      Right lower leg: No edema.      Left lower leg: No edema.   Skin:     General: Skin is warm and dry.   Neurological:      Mental Status: She is oriented to person, place, and time.           Significant Labs: All pertinent labs within the past 24 hours have been reviewed.  CBC:   Recent Labs   Lab 05/04/22  0821   WBC 10.21   HGB 10.9*   HCT 34.4*        CMP:   Recent Labs   Lab 05/04/22 0821   *   K 4.6   CL 97   CO2 26   *   BUN 25*   CREATININE 1.7*   CALCIUM 8.8   ANIONGAP 12   EGFRNONAA 29*       Significant Imaging: I have reviewed all pertinent imaging results/findings within the past 24 hours.    Assessment/Plan:     * Type 2 diabetes mellitus with hyperglycemia  - Uncontrolled. A1c 11.6.   - Glucose 648 with AGMA  - Given regular insulin 8 units IV x 1  - Start home regimen: levemir 20U BID and aspart 12U TIDWM  - Start moderate dose SSI  - Continue IVF  - Consistent carb diet  - Non-toxic appearing. Check UA to r/o UTI    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 pectoris  - Planned for LHC today but cancelled due to hyperglycemia and RANJEET  - Continue aspirin, plavix, metoprolol  - Nitroglycerin PRN  - NPO after midnight in case LHC can be done  - Cardiology following    RANJEET (acute kidney injury)  - Baseline Cr 1.0 - 1.3  - Presents with Cr. 1.7  - Suspect due to volume depletion from hyperglycemia  - Continue IVF. Monitor I/Os  -  Hold ARB and lasix. Renally dose meds    Essential hypertension  - Continue clonidine, metoprolol, and nifedipine    Fibromyalgia  Chronic pain   - Continue home regimen with norco 10/325 mg q8h PRN, methocarbamol 500 mg q8h PRN and lyrica - renally dose at 150 mg BID    VTE Risk Mitigation (From admission, onward)         Ordered     heparin (porcine) injection 5,000 Units  Every 8 hours         05/04/22 0931     IP VTE HIGH RISK PATIENT  Once         05/04/22 0931     Place sequential compression device  Until discontinued         05/04/22 0931                   Keila Chan MD  Department of Hospital Medicine   [facility] ([place])

## 2022-05-04 NOTE — ASSESSMENT & PLAN NOTE
- Baseline Cr 1.0 - 1.3  - Presents with Cr. 1.7  - Suspect due to volume depletion from hyperglycemia  - Continue IVF. Monitor I/Os  - Hold ARB and lasix. Renally dose meds

## 2022-05-04 NOTE — PROGRESS NOTES
1040 Patient AAOx3. Daughter at the bedside. IVP insulin order administration verified with Hospital , [facility]. 8u Regular Insulin administered IVP, will recheck BG in 30min.     1050 Report called to TERRY Choi. Will transfer to room 382 after BG recheck. POC discussed with patient and daughter, both verbalized understanding.

## 2022-05-04 NOTE — Clinical Note
dry, intact, no bleeding and no hematoma. R wrist insertion site has Vasc band with 3cc of air instilled

## 2022-05-04 NOTE — Clinical Note
The catheter was inserted into the and was inserted over the wire into the aorta. An angiography was performed of the left coronary arteries. Multiple views were taken.

## 2022-05-04 NOTE — ASSESSMENT & PLAN NOTE
- Uncontrolled. A1c 11.6.   - Glucose 648 with AGMA  - Given regular insulin 8 units IV x 1  - Start home regimen: levemir 20U BID and aspart 12U TIDWM  - Start moderate dose SSI  - Continue IVF  - Consistent carb diet  - Non-toxic appearing. Check UA to r/o UTI

## 2022-05-05 ENCOUNTER — PATIENT OUTREACH (OUTPATIENT)
Dept: ADMINISTRATIVE | Facility: OTHER | Age: 77
End: 2022-05-05
Payer: MEDICARE

## 2022-05-05 PROBLEM — R41.0 DELIRIUM: Status: ACTIVE | Noted: 2022-05-05

## 2022-05-05 PROBLEM — J96.01 ACUTE HYPOXEMIC RESPIRATORY FAILURE: Status: ACTIVE | Noted: 2022-05-05

## 2022-05-05 LAB
ALBUMIN SERPL BCP-MCNC: 3 G/DL (ref 3.5–5.2)
ALLENS TEST: ABNORMAL
ANION GAP SERPL CALC-SCNC: 11 MMOL/L (ref 8–16)
ANION GAP SERPL CALC-SCNC: 13 MMOL/L (ref 8–16)
BASOPHILS # BLD AUTO: 0.02 K/UL (ref 0–0.2)
BASOPHILS # BLD AUTO: 0.04 K/UL (ref 0–0.2)
BASOPHILS NFR BLD: 0.2 % (ref 0–1.9)
BASOPHILS NFR BLD: 0.4 % (ref 0–1.9)
BNP SERPL-MCNC: 1120 PG/ML (ref 0–99)
BUN SERPL-MCNC: 19 MG/DL (ref 8–23)
BUN SERPL-MCNC: 23 MG/DL (ref 8–23)
CALCIUM SERPL-MCNC: 8.3 MG/DL (ref 8.7–10.5)
CALCIUM SERPL-MCNC: 8.5 MG/DL (ref 8.7–10.5)
CHLORIDE SERPL-SCNC: 104 MMOL/L (ref 95–110)
CHLORIDE SERPL-SCNC: 108 MMOL/L (ref 95–110)
CO2 SERPL-SCNC: 23 MMOL/L (ref 23–29)
CO2 SERPL-SCNC: 25 MMOL/L (ref 23–29)
CREAT SERPL-MCNC: 1.4 MG/DL (ref 0.5–1.4)
CREAT SERPL-MCNC: 1.4 MG/DL (ref 0.5–1.4)
DIFFERENTIAL METHOD: ABNORMAL
DIFFERENTIAL METHOD: ABNORMAL
EOSINOPHIL # BLD AUTO: 0 K/UL (ref 0–0.5)
EOSINOPHIL # BLD AUTO: 0.2 K/UL (ref 0–0.5)
EOSINOPHIL NFR BLD: 0.3 % (ref 0–8)
EOSINOPHIL NFR BLD: 1.7 % (ref 0–8)
ERYTHROCYTE [DISTWIDTH] IN BLOOD BY AUTOMATED COUNT: 13.8 % (ref 11.5–14.5)
ERYTHROCYTE [DISTWIDTH] IN BLOOD BY AUTOMATED COUNT: 14.2 % (ref 11.5–14.5)
EST. GFR  (AFRICAN AMERICAN): 42 ML/MIN/1.73 M^2
EST. GFR  (AFRICAN AMERICAN): 42 ML/MIN/1.73 M^2
EST. GFR  (NON AFRICAN AMERICAN): 37 ML/MIN/1.73 M^2
EST. GFR  (NON AFRICAN AMERICAN): 37 ML/MIN/1.73 M^2
GLUCOSE SERPL-MCNC: 375 MG/DL (ref 70–110)
GLUCOSE SERPL-MCNC: 83 MG/DL (ref 70–110)
HCO3 UR-SCNC: 25.4 MMOL/L (ref 24–28)
HCT VFR BLD AUTO: 31 % (ref 37–48.5)
HCT VFR BLD AUTO: 35.8 % (ref 37–48.5)
HGB BLD-MCNC: 10.2 G/DL (ref 12–16)
HGB BLD-MCNC: 11.4 G/DL (ref 12–16)
IMM GRANULOCYTES # BLD AUTO: 0.03 K/UL (ref 0–0.04)
IMM GRANULOCYTES # BLD AUTO: 0.04 K/UL (ref 0–0.04)
IMM GRANULOCYTES NFR BLD AUTO: 0.3 % (ref 0–0.5)
IMM GRANULOCYTES NFR BLD AUTO: 0.4 % (ref 0–0.5)
INFLUENZA A, MOLECULAR: NEGATIVE
INFLUENZA B, MOLECULAR: NEGATIVE
INR PPP: 1 (ref 0.8–1.2)
LYMPHOCYTES # BLD AUTO: 1.1 K/UL (ref 1–4.8)
LYMPHOCYTES # BLD AUTO: 1.2 K/UL (ref 1–4.8)
LYMPHOCYTES NFR BLD: 11 % (ref 18–48)
LYMPHOCYTES NFR BLD: 13.2 % (ref 18–48)
MAGNESIUM SERPL-MCNC: 2.1 MG/DL (ref 1.6–2.6)
MCH RBC QN AUTO: 28.4 PG (ref 27–31)
MCH RBC QN AUTO: 28.7 PG (ref 27–31)
MCHC RBC AUTO-ENTMCNC: 31.8 G/DL (ref 32–36)
MCHC RBC AUTO-ENTMCNC: 32.9 G/DL (ref 32–36)
MCV RBC AUTO: 87 FL (ref 82–98)
MCV RBC AUTO: 89 FL (ref 82–98)
MONOCYTES # BLD AUTO: 0.4 K/UL (ref 0.3–1)
MONOCYTES # BLD AUTO: 1.4 K/UL (ref 0.3–1)
MONOCYTES NFR BLD: 14.6 % (ref 4–15)
MONOCYTES NFR BLD: 4.2 % (ref 4–15)
NEUTROPHILS # BLD AUTO: 6.7 K/UL (ref 1.8–7.7)
NEUTROPHILS # BLD AUTO: 7.9 K/UL (ref 1.8–7.7)
NEUTROPHILS NFR BLD: 71.3 % (ref 38–73)
NEUTROPHILS NFR BLD: 82.4 % (ref 38–73)
NRBC BLD-RTO: 0 /100 WBC
NRBC BLD-RTO: 0 /100 WBC
PCO2 BLDA: 43.2 MMHG (ref 35–45)
PH SMN: 7.38 [PH] (ref 7.35–7.45)
PHOSPHATE SERPL-MCNC: 2.5 MG/DL (ref 2.7–4.5)
PHOSPHATE SERPL-MCNC: 4.3 MG/DL (ref 2.7–4.5)
PLATELET # BLD AUTO: 204 K/UL (ref 150–450)
PLATELET # BLD AUTO: 230 K/UL (ref 150–450)
PMV BLD AUTO: 11.6 FL (ref 9.2–12.9)
PMV BLD AUTO: 12 FL (ref 9.2–12.9)
PO2 BLDA: 21 MMHG (ref 40–60)
POC BE: 0 MMOL/L
POC SATURATED O2: 35 % (ref 95–100)
POC TCO2: 27 MMOL/L (ref 24–29)
POCT GLUCOSE: 101 MG/DL (ref 70–110)
POCT GLUCOSE: 126 MG/DL (ref 70–110)
POCT GLUCOSE: 251 MG/DL (ref 70–110)
POCT GLUCOSE: 345 MG/DL (ref 70–110)
POCT GLUCOSE: 500 MG/DL (ref 70–110)
POCT GLUCOSE: 66 MG/DL (ref 70–110)
POTASSIUM SERPL-SCNC: 4.1 MMOL/L (ref 3.5–5.1)
POTASSIUM SERPL-SCNC: 4.5 MMOL/L (ref 3.5–5.1)
PROTHROMBIN TIME: 10.6 SEC (ref 9–12.5)
RBC # BLD AUTO: 3.55 M/UL (ref 4–5.4)
RBC # BLD AUTO: 4.01 M/UL (ref 4–5.4)
SAMPLE: ABNORMAL
SODIUM SERPL-SCNC: 140 MMOL/L (ref 136–145)
SODIUM SERPL-SCNC: 144 MMOL/L (ref 136–145)
SPECIMEN SOURCE: NORMAL
TROPONIN I SERPL DL<=0.01 NG/ML-MCNC: 0.01 NG/ML (ref 0–0.03)
WBC # BLD AUTO: 9.36 K/UL (ref 3.9–12.7)
WBC # BLD AUTO: 9.57 K/UL (ref 3.9–12.7)

## 2022-05-05 PROCEDURE — C9399 UNCLASSIFIED DRUGS OR BIOLOG: HCPCS | Performed by: INTERNAL MEDICINE

## 2022-05-05 PROCEDURE — 80069 RENAL FUNCTION PANEL: CPT | Performed by: INTERNAL MEDICINE

## 2022-05-05 PROCEDURE — 96372 THER/PROPH/DIAG INJ SC/IM: CPT | Performed by: INTERNAL MEDICINE

## 2022-05-05 PROCEDURE — 99233 PR SUBSEQUENT HOSPITAL CARE,LEVL III: ICD-10-PCS | Mod: ,,, | Performed by: INTERNAL MEDICINE

## 2022-05-05 PROCEDURE — 21400001 HC TELEMETRY ROOM

## 2022-05-05 PROCEDURE — 87502 INFLUENZA DNA AMP PROBE: CPT | Performed by: INTERNAL MEDICINE

## 2022-05-05 PROCEDURE — 25000003 PHARM REV CODE 250: Performed by: INTERNAL MEDICINE

## 2022-05-05 PROCEDURE — 63600175 PHARM REV CODE 636 W HCPCS: Performed by: NURSE PRACTITIONER

## 2022-05-05 PROCEDURE — 99233 SBSQ HOSP IP/OBS HIGH 50: CPT | Mod: ,,, | Performed by: INTERNAL MEDICINE

## 2022-05-05 PROCEDURE — 63600175 PHARM REV CODE 636 W HCPCS: Performed by: INTERNAL MEDICINE

## 2022-05-05 PROCEDURE — 85610 PROTHROMBIN TIME: CPT | Performed by: INTERNAL MEDICINE

## 2022-05-05 PROCEDURE — 83735 ASSAY OF MAGNESIUM: CPT | Performed by: INTERNAL MEDICINE

## 2022-05-05 PROCEDURE — 93005 ELECTROCARDIOGRAM TRACING: CPT

## 2022-05-05 PROCEDURE — 36415 COLL VENOUS BLD VENIPUNCTURE: CPT | Performed by: INTERNAL MEDICINE

## 2022-05-05 PROCEDURE — 93010 ELECTROCARDIOGRAM REPORT: CPT | Mod: ,,, | Performed by: INTERNAL MEDICINE

## 2022-05-05 PROCEDURE — 27000221 HC OXYGEN, UP TO 24 HOURS

## 2022-05-05 PROCEDURE — 94640 AIRWAY INHALATION TREATMENT: CPT

## 2022-05-05 PROCEDURE — 84100 ASSAY OF PHOSPHORUS: CPT | Performed by: INTERNAL MEDICINE

## 2022-05-05 PROCEDURE — 80048 BASIC METABOLIC PNL TOTAL CA: CPT | Performed by: INTERNAL MEDICINE

## 2022-05-05 PROCEDURE — 93010 EKG 12-LEAD: ICD-10-PCS | Mod: ,,, | Performed by: INTERNAL MEDICINE

## 2022-05-05 PROCEDURE — 83880 ASSAY OF NATRIURETIC PEPTIDE: CPT | Performed by: INTERNAL MEDICINE

## 2022-05-05 PROCEDURE — 84484 ASSAY OF TROPONIN QUANT: CPT | Performed by: NURSE PRACTITIONER

## 2022-05-05 PROCEDURE — 96372 THER/PROPH/DIAG INJ SC/IM: CPT | Performed by: NURSE PRACTITIONER

## 2022-05-05 PROCEDURE — 85025 COMPLETE CBC W/AUTO DIFF WBC: CPT | Performed by: INTERNAL MEDICINE

## 2022-05-05 PROCEDURE — 25000242 PHARM REV CODE 250 ALT 637 W/ HCPCS: Performed by: NURSE PRACTITIONER

## 2022-05-05 RX ORDER — IPRATROPIUM BROMIDE AND ALBUTEROL SULFATE 2.5; .5 MG/3ML; MG/3ML
3 SOLUTION RESPIRATORY (INHALATION) EVERY 4 HOURS PRN
Status: DISCONTINUED | OUTPATIENT
Start: 2022-05-05 | End: 2022-05-07 | Stop reason: HOSPADM

## 2022-05-05 RX ORDER — INSULIN ASPART 100 [IU]/ML
8 INJECTION, SOLUTION INTRAVENOUS; SUBCUTANEOUS
Status: DISCONTINUED | OUTPATIENT
Start: 2022-05-05 | End: 2022-05-05

## 2022-05-05 RX ORDER — FUROSEMIDE 10 MG/ML
40 INJECTION INTRAMUSCULAR; INTRAVENOUS ONCE
Status: COMPLETED | OUTPATIENT
Start: 2022-05-05 | End: 2022-05-05

## 2022-05-05 RX ORDER — FUROSEMIDE 10 MG/ML
20 INJECTION INTRAMUSCULAR; INTRAVENOUS ONCE
Status: COMPLETED | OUTPATIENT
Start: 2022-05-05 | End: 2022-05-05

## 2022-05-05 RX ORDER — INSULIN ASPART 100 [IU]/ML
6 INJECTION, SOLUTION INTRAVENOUS; SUBCUTANEOUS ONCE
Status: COMPLETED | OUTPATIENT
Start: 2022-05-05 | End: 2022-05-05

## 2022-05-05 RX ADMIN — INSULIN DETEMIR 10 UNITS: 100 INJECTION, SOLUTION SUBCUTANEOUS at 09:05

## 2022-05-05 RX ADMIN — METOPROLOL SUCCINATE 50 MG: 50 TABLET, EXTENDED RELEASE ORAL at 09:05

## 2022-05-05 RX ADMIN — NIFEDIPINE 30 MG: 30 TABLET, FILM COATED, EXTENDED RELEASE ORAL at 09:05

## 2022-05-05 RX ADMIN — PREGABALIN 150 MG: 75 CAPSULE ORAL at 08:05

## 2022-05-05 RX ADMIN — CLONIDINE HYDROCHLORIDE 0.1 MG: 0.1 TABLET ORAL at 09:05

## 2022-05-05 RX ADMIN — IPRATROPIUM BROMIDE AND ALBUTEROL SULFATE 3 ML: 2.5; .5 SOLUTION RESPIRATORY (INHALATION) at 04:05

## 2022-05-05 RX ADMIN — ACETAMINOPHEN 650 MG: 325 TABLET, FILM COATED ORAL at 04:05

## 2022-05-05 RX ADMIN — SERTRALINE 100 MG: 100 TABLET, FILM COATED ORAL at 08:05

## 2022-05-05 RX ADMIN — PREGABALIN 150 MG: 75 CAPSULE ORAL at 09:05

## 2022-05-05 RX ADMIN — PANTOPRAZOLE SODIUM 40 MG: 40 TABLET, DELAYED RELEASE ORAL at 08:05

## 2022-05-05 RX ADMIN — METHOCARBAMOL TABLETS 500 MG: 500 TABLET, COATED ORAL at 09:05

## 2022-05-05 RX ADMIN — ASPIRIN 81 MG: 81 TABLET, COATED ORAL at 08:05

## 2022-05-05 RX ADMIN — INSULIN ASPART 6 UNITS: 100 INJECTION, SOLUTION INTRAVENOUS; SUBCUTANEOUS at 04:05

## 2022-05-05 RX ADMIN — INSULIN ASPART 2 UNITS: 100 INJECTION, SOLUTION INTRAVENOUS; SUBCUTANEOUS at 09:05

## 2022-05-05 RX ADMIN — INSULIN ASPART 12 UNITS: 100 INJECTION, SOLUTION INTRAVENOUS; SUBCUTANEOUS at 08:05

## 2022-05-05 RX ADMIN — CLOPIDOGREL 75 MG: 75 TABLET, FILM COATED ORAL at 08:05

## 2022-05-05 RX ADMIN — HEPARIN SODIUM 5000 UNITS: 5000 INJECTION INTRAVENOUS; SUBCUTANEOUS at 08:05

## 2022-05-05 RX ADMIN — INSULIN DETEMIR 20 UNITS: 100 INJECTION, SOLUTION SUBCUTANEOUS at 08:05

## 2022-05-05 RX ADMIN — FUROSEMIDE 20 MG: 10 INJECTION, SOLUTION INTRAMUSCULAR; INTRAVENOUS at 04:05

## 2022-05-05 RX ADMIN — NIFEDIPINE 30 MG: 30 TABLET, FILM COATED, EXTENDED RELEASE ORAL at 08:05

## 2022-05-05 RX ADMIN — INSULIN ASPART 6 UNITS: 100 INJECTION, SOLUTION INTRAVENOUS; SUBCUTANEOUS at 08:05

## 2022-05-05 RX ADMIN — HEPARIN SODIUM 5000 UNITS: 5000 INJECTION INTRAVENOUS; SUBCUTANEOUS at 10:05

## 2022-05-05 RX ADMIN — HYDROCODONE BITARTRATE AND ACETAMINOPHEN 1 TABLET: 10; 325 TABLET ORAL at 03:05

## 2022-05-05 RX ADMIN — FUROSEMIDE 40 MG: 10 INJECTION, SOLUTION INTRAMUSCULAR; INTRAVENOUS at 09:05

## 2022-05-05 NOTE — CARE UPDATE
05/05/22 0422   Patient Assessment/Suction   Level of Consciousness (AVPU) alert  (Little confused)   Respiratory Effort Mild   All Lung Fields Breath Sounds Anterior:;pleural rub;crackles, fine   CARMEN Breath Sounds pleural rub   Cough Frequency incessant   Cough Type loose;nonproductive   Sent to the lab? No   Is this patient in SNF or Inpatient Rehab? No   PRE-TX-O2   O2 Device (Oxygen Therapy) nasal cannula   $ Is the patient on Low Flow Oxygen? Yes   Flow (L/min) 4   SpO2 95 %   Pulse Oximetry Type Intermittent   Pulse 95   Resp 20   Positioning HOB elevated 30 degrees   Positioning   Body Position position maintained   Head of Bed (HOB) Positioning HOB at 30 degrees   Aerosol Therapy   $ Aerosol Therapy Charges Aerosol Treatment   Respiratory Treatment Status (SVN) given   Treatment Route (SVN) mask   Patient Position (SVN) HOB elevated   Post Treatment Assessment (SVN) breath sounds unchanged   Signs of Intolerance (SVN) none   Breath Sounds Post-Respiratory Treatment   Post-treatment Heart Rate (beats/min) 95   Post-treatment Resp Rate (breaths/min) 22

## 2022-05-05 NOTE — SIGNIFICANT EVENT
Called to bedside for patient with wheezing.  On assessment, patient pale, not diaphoretic. Audible wheezes.  Duoneb, stat labs, CXR pending. Hold IVF

## 2022-05-05 NOTE — ASSESSMENT & PLAN NOTE
Acute on chronic diastolic CHF  - Hypoxic overnight with RA sats down to 86%. Improved with oxygen protocol. Continue  - CXR with mild interstitial edema. BNP 1120. Rales on exam  - Due to IVF which have been stopped  - Give lasix 40 mg IV x 1 and re-evalaute this afternoon  - Echo in 2021 with grade I diastolic dysfunction and normal EF. Repeat echo

## 2022-05-05 NOTE — ASSESSMENT & PLAN NOTE
- With hallucinations and confusion that waxes and wanes  - Suspect hospital/mediation induced  - Delirium precautions ordered

## 2022-05-05 NOTE — ASSESSMENT & PLAN NOTE
- Baseline Cr 1.0 - 1.3  - Presents with Cr. 1.7  - Suspect due to volume depletion from hyperglycemia  - Cr down trending with IVF to 1.4. IVF stopped  - Hold ARB and lasix. Renally dose meds

## 2022-05-05 NOTE — PLAN OF CARE
AAOX4. VSS.Pt free of trauma, falls, injury and skin breakdown. Pt denies pain at this time. NaCl infusing @ 125ml/hr to 22g L wrist. Pt has been eating and voiding adequately throughout shift. Blood glucose monitoring maintained.Pt ambulates w/ rollator/cane and repositions self independently. Purposeful hourly rounding. Pt has call light in reach, side rails up X2, bed in low position, SCDs and nonskid socks on. Pt lying in bed in no distress w/ daughter at the bedside. Pt NPO after midnight.

## 2022-05-05 NOTE — PROGRESS NOTES
[facility] CARDIOLOGY    Admission date:  5/4/2022     Assessment    Acute heart failure  Likely related to hydration efforts to correct her glucose.    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  But will check an EKG to make sure acute pulmonary edema was not related to ischemia.     Essential hypertension  Elevated during events of early morning    Plan and Discussion    Would not proceed with angiography today as she is volume overloaded which would be worsened by contrast load.  Will tentatively plan for tomorrow.  Agree with Lasix.  Check EKG.    Subjective    Became acutely short of breath this morning.  Fluid stopped.  Given Lasix.  Feeling better now.    Medications  Current Facility-Administered Medications   Medication Dose Route Frequency Provider Last Rate Last Admin    acetaminophen tablet 650 mg  650 mg Oral Q4H PRN Keila Chan MD        albuterol-ipratropium 2.5 mg-0.5 mg/3 mL nebulizer solution 3 mL  3 mL Nebulization Q4H PRN Jairon Ariza, BRAN   3 mL at 05/05/22 0422    aluminum-magnesium hydroxide-simethicone 200-200-20 mg/5 mL suspension 30 mL  30 mL Oral QID PRN Keila Chan MD        aspirin EC tablet 81 mg  81 mg Oral Daily Keila Chan MD   81 mg at 05/05/22 0846    cloNIDine tablet 0.1 mg  0.1 mg Oral BID Keila Chan MD   0.1 mg at 05/04/22 2220    clopidogreL tablet 75 mg  75 mg Oral Daily Keila Chan MD   75 mg at 05/05/22 0845    dextrose 10% bolus 125 mL  12.5 g Intravenous PRN Keila Chan MD        dextrose 10% bolus 250 mL  25 g Intravenous PRN Keila Chan MD        glucagon (human recombinant) injection 1 mg  1 mg Intramuscular PRN Keila Chan MD        glucose chewable tablet 16 g  16 g Oral PRN Keila Chan MD        glucose chewable tablet 24 g  24 g Oral PRN Keila Chan MD        heparin (porcine) injection 5,000 Units  5,000 Units Subcutaneous Q8H Keila Chan  MD   5,000 Units at 05/05/22 0846    HYDROcodone-acetaminophen  mg per tablet 1 tablet  1 tablet Oral Q8H PRN Keila Chan MD   1 tablet at 05/04/22 2241    insulin aspart U-100 pen 1-10 Units  1-10 Units Subcutaneous QID (AC + HS) PRN Keila Chan MD   6 Units at 05/05/22 0843    insulin aspart U-100 pen 8 Units  8 Units Subcutaneous TIDWM Keila Chan MD        insulin detemir U-100 pen 20 Units  20 Units Subcutaneous BID Keila Chan MD   20 Units at 05/05/22 0845    melatonin tablet 6 mg  6 mg Oral Nightly PRN Keila Chan MD        methocarbamoL tablet 500 mg  500 mg Oral TID PRN Keila Chan MD        metoprolol succinate (TOPROL-XL) 24 hr tablet 50 mg  50 mg Oral Nightly Keila Chan MD   50 mg at 05/04/22 2220    naloxone 0.4 mg/mL injection 0.02 mg  0.02 mg Intravenous PRN Keila Chan MD        NIFEdipine 24 hr tablet 30 mg  30 mg Oral BID Keila Chan MD   30 mg at 05/05/22 0845    nitroGLYCERIN SL tablet 0.4 mg  0.4 mg Sublingual Q5 Min PRN Keila Chan MD        ondansetron injection 4 mg  4 mg Intravenous Q6H PRN Keila Chan MD        pantoprazole EC tablet 40 mg  40 mg Oral Daily Keila Chan MD   40 mg at 05/05/22 0846    polyethylene glycol packet 17 g  17 g Oral Daily PRN Keila Chan MD        pregabalin capsule 150 mg  150 mg Oral BID Keila Chan MD   150 mg at 05/05/22 0846    sertraline tablet 100 mg  100 mg Oral Daily Keila Chan MD   100 mg at 05/05/22 0846    sodium chloride 0.9% flush 10 mL  10 mL Intravenous Q8H PRN Keila Chan MD            Physical Exam    Temp:  [96.8 °F (36 °C)-99.4 °F (37.4 °C)]   Pulse:  [58-97]   Resp:  [16-26]   BP: (116-217)/(56-93)   SpO2:  [86 %-97 %]    Wt Readings from Last 3 Encounters:   05/05/22 84.2 kg (185 lb 10 oz)   04/27/22 81.4 kg (179 lb 8 oz)   04/20/22 84.2 kg (185 lb 10 oz)     Physical Exam  Constitutional:       General: She is not in acute distress.  Neck:      Vascular:  JVD present.   Cardiovascular:      Rate and Rhythm: Normal rate and regular rhythm.      Heart sounds: S1 normal and S2 normal. No murmur heard.    No S3 or S4 sounds.   Pulmonary:      Effort: Pulmonary effort is normal.      Breath sounds: Normal breath sounds and air entry.   Abdominal:      General: Bowel sounds are normal.      Palpations: Abdomen is soft. There is no hepatomegaly.      Tenderness: There is no abdominal tenderness.   Musculoskeletal:      Right lower leg: No edema.      Left lower leg: No edema.   Skin:     Coloration: Skin is not pale.   Neurological:      Mental Status: She is alert.   Psychiatric:         Behavior: Behavior is cooperative.         Labs  Recent Results (from the past 24 hour(s))   POCT glucose    Collection Time: 05/04/22 10:38 AM   Result Value Ref Range    POCT Glucose >500 (H) 70 - 110 mg/dL   POCT glucose    Collection Time: 05/04/22 11:11 AM   Result Value Ref Range    POCT Glucose >500 (H) 70 - 110 mg/dL   POCT glucose    Collection Time: 05/04/22  1:39 PM   Result Value Ref Range    POCT Glucose 368 (H) 70 - 110 mg/dL   Urinalysis, Reflex to Urine Culture Urine, Clean Catch    Collection Time: 05/04/22  1:45 PM    Specimen: Urine   Result Value Ref Range    Specimen UA Urine, Clean Catch     Color, UA Yellow Yellow, Straw, Aye    Appearance, UA Clear Clear    pH, UA 6.0 5.0 - 8.0    Specific Gravity, UA 1.010 1.005 - 1.030    Protein, UA Negative Negative    Glucose, UA 3+ (A) Negative    Ketones, UA Negative Negative    Bilirubin (UA) Negative Negative    Occult Blood UA 1+ (A) Negative    Nitrite, UA Negative Negative    Urobilinogen, UA Negative <2.0 EU/dL    Leukocytes, UA Negative Negative   Urinalysis Microscopic    Collection Time: 05/04/22  1:45 PM   Result Value Ref Range    RBC, UA 4 0 - 4 /hpf    Bacteria Rare None-Occ /hpf    Yeast, UA None None    Microscopic Comment SEE COMMENT    POCT glucose    Collection Time: 05/04/22  5:18 PM   Result Value Ref  Range    POCT Glucose 100 70 - 110 mg/dL   POCT glucose    Collection Time: 05/04/22  9:09 PM   Result Value Ref Range    POCT Glucose 231 (H) 70 - 110 mg/dL   POCT glucose    Collection Time: 05/05/22  3:48 AM   Result Value Ref Range    POCT Glucose 345 (H) 70 - 110 mg/dL   Basic Metabolic Panel (BMP)    Collection Time: 05/05/22  4:35 AM   Result Value Ref Range    Sodium 140 136 - 145 mmol/L    Potassium 4.5 3.5 - 5.1 mmol/L    Chloride 104 95 - 110 mmol/L    CO2 23 23 - 29 mmol/L    Glucose 375 (H) 70 - 110 mg/dL    BUN 19 8 - 23 mg/dL    Creatinine 1.4 0.5 - 1.4 mg/dL    Calcium 8.3 (L) 8.7 - 10.5 mg/dL    Anion Gap 13 8 - 16 mmol/L    eGFR if African American 42 (A) >60 mL/min/1.73 m^2    eGFR if non African American 37 (A) >60 mL/min/1.73 m^2   Magnesium    Collection Time: 05/05/22  4:35 AM   Result Value Ref Range    Magnesium 2.1 1.6 - 2.6 mg/dL   Phosphorus    Collection Time: 05/05/22  4:35 AM   Result Value Ref Range    Phosphorus 2.5 (L) 2.7 - 4.5 mg/dL   CBC with Automated Differential    Collection Time: 05/05/22  4:35 AM   Result Value Ref Range    WBC 9.57 3.90 - 12.70 K/uL    RBC 4.01 4.00 - 5.40 M/uL    Hemoglobin 11.4 (L) 12.0 - 16.0 g/dL    Hematocrit 35.8 (L) 37.0 - 48.5 %    MCV 89 82 - 98 fL    MCH 28.4 27.0 - 31.0 pg    MCHC 31.8 (L) 32.0 - 36.0 g/dL    RDW 13.8 11.5 - 14.5 %    Platelets 230 150 - 450 K/uL    MPV 11.6 9.2 - 12.9 fL    Immature Granulocytes 0.3 0.0 - 0.5 %    Gran # (ANC) 7.9 (H) 1.8 - 7.7 K/uL    Immature Grans (Abs) 0.03 0.00 - 0.04 K/uL    Lymph # 1.1 1.0 - 4.8 K/uL    Mono # 0.4 0.3 - 1.0 K/uL    Eos # 0.2 0.0 - 0.5 K/uL    Baso # 0.04 0.00 - 0.20 K/uL    nRBC 0 0 /100 WBC    Gran % 82.4 (H) 38.0 - 73.0 %    Lymph % 11.0 (L) 18.0 - 48.0 %    Mono % 4.2 4.0 - 15.0 %    Eosinophil % 1.7 0.0 - 8.0 %    Basophil % 0.4 0.0 - 1.9 %    Differential Method Automated    Troponin I    Collection Time: 05/05/22  4:35 AM   Result Value Ref Range    Troponin I 0.007 0.000 - 0.026  ng/mL   ISTAT PROCEDURE    Collection Time: 05/05/22  4:40 AM   Result Value Ref Range    POC PH 7.378 7.35 - 7.45    POC PCO2 43.2 35 - 45 mmHg    POC PO2 21 (L) 40 - 60 mmHg    POC HCO3 25.4 24 - 28 mmol/L    POC BE 0 -2 to 2 mmol/L    POC SATURATED O2 35 (L) 95 - 100 %    POC TCO2 27 24 - 29 mmol/L    Sample VENOUS     Allens Test N/A         Imaging  EXAMINATION:  XR CHEST AP PORTABLE     CLINICAL HISTORY:  Wheezing, hypoxia;     TECHNIQUE:  Single frontal view of the chest was performed.     COMPARISON:  12/21/2021     FINDINGS:  The cardiomediastinal silhouette is unchanged in size and configuration noting atherosclerosis of the thoracic aorta.  Mediastinal structures are midline.  There are low lung volumes with increased interstitial parenchymal attenuation which could relate to hypoventilatory status versus interstitial edema.  No large volume of pleural fluid or pneumothorax is identified.     Impression:     Please see above.        Electronically signed by: Shantel Gonzalez MD  Date:                                            05/05/2022  Time:                                           05:27       Jose L Méndez MD

## 2022-05-05 NOTE — PLAN OF CARE
Initial Discharge Planning Assessment:  Patient admitted on: 5/4/22     Chart reviewed, Care plan discussed with treatment team,  attending Dr. Keila Chan     PCP updated in Epic: Dr. Chioma Zapata  Pharmacy, updated in [facility]: [facility] (bedside)   DME at home: cane, rolling walker, bedside commoode      Current dispo: Home      Transportation: Patient will need help with transportation home upon discharge.     Power of  or Living Will: Unknown     Anticipated DC needs from CM perspective:  Transportation home.       05/05/22 1355   Discharge Assessment   Assessment Type Discharge Planning Assessment   Confirmed/corrected address, phone number and insurance Yes   Confirmed Demographics Correct on Facesheet   Source of Information patient   Lives With child(georges), adult   Do you expect to return to your current living situation? Yes   Do you have help at home or someone to help you manage your care at home? Yes   Who are your caregiver(s) and their phone number(s)? Daughters   Prior to hospitilization cognitive status: Alert/Oriented   Current cognitive status: Alert/Oriented   Walking or Climbing Stairs Difficulty ambulation difficulty, requires equipment   Dressing/Bathing Difficulty bathing difficulty, assistance 1 person   Do you have any problems with: Needs other help   Equipment Currently Used at Home walker, rolling;cane, straight;commode   Readmission within 30 days? No   Patient currently being followed by outpatient case management? No   Do you currently have service(s) that help you manage your care at home? No   Do you take prescription medications? Yes   Do you have prescription coverage? Yes   Do you have any problems affording any of your prescribed medications? Yes   If yes, what medications? Patient reported having trouble buying some medications.   Is the patient taking medications as prescribed? yes   Who is going to help you get home at discharge? Patient reported that she may  need help with transportation home at discharge.   How do you get to doctors appointments? family or friend will provide   Are you on dialysis? No   Do you take coumadin? No   Discharge Plan A Home with family   Discharge Plan B Home with family   DME Needed Upon Discharge  none   Discharge Plan discussed with: Patient   Discharge Barriers Identified Transportation   Relationship/Environment   Name(s) of Who Lives With Patient Daughters

## 2022-05-05 NOTE — SUBJECTIVE & OBJECTIVE
Interval History: Overnight pt with wheezing and hypoxia. O2 sats down to 86% on RA, improved on 4L NC. Also with confusion and hallucinations overnight. Daughter reports she had similar episode of confusion on prior hospitalization. Updated on POC. Questions answered.    Review of Systems   Constitutional:  Negative for chills and fever.   Respiratory:  Positive for shortness of breath and wheezing.    Cardiovascular:  Negative for leg swelling.   Gastrointestinal:  Negative for nausea and vomiting.   Psychiatric/Behavioral:  Positive for hallucinations.    Objective:     Vital Signs (Most Recent):  Temp: (!) 101.1 °F (38.4 °C) (05/05/22 1619)  Pulse: 79 (05/05/22 1619)  Resp: 18 (05/05/22 1619)  BP: (!) 126/58 (05/05/22 1619)  SpO2: 98 % (05/05/22 1619)   Vital Signs (24h Range):  Temp:  [96.8 °F (36 °C)-101.1 °F (38.4 °C)] 101.1 °F (38.4 °C)  Pulse:  [69-97] 79  Resp:  [16-26] 18  SpO2:  [86 %-98 %] 98 %  BP: (114-217)/(56-93) 126/58     Weight: 84.2 kg (185 lb 10 oz)  Body mass index is 36.25 kg/m².    Intake/Output Summary (Last 24 hours) at 5/5/2022 1622  Last data filed at 5/5/2022 1529  Gross per 24 hour   Intake 2280 ml   Output 1000 ml   Net 1280 ml      Physical Exam  Vitals and nursing note reviewed.   Constitutional:       General: She is not in acute distress.     Appearance: She is well-developed. She is obese.      Interventions: Nasal cannula in place.   Cardiovascular:      Rate and Rhythm: Normal rate and regular rhythm.      Pulses: Normal pulses.   Pulmonary:      Effort: Pulmonary effort is normal.      Breath sounds: Rales present.      Comments: B/l rales noted  Abdominal:      General: Bowel sounds are normal.      Palpations: Abdomen is soft.      Tenderness: There is no abdominal tenderness.   Musculoskeletal:         General: Normal range of motion.      Right lower leg: No edema.      Left lower leg: No edema.   Skin:     General: Skin is warm and dry.   Neurological:      General: No  focal deficit present.      Mental Status: She is alert and oriented to person, place, and time.      Comments: Slowed response but A&O   Psychiatric:         Attention and Perception: She is inattentive. She perceives visual hallucinations.       Significant Labs: All pertinent labs within the past 24 hours have been reviewed.  CBC:   Recent Labs   Lab 05/04/22  0821 05/05/22  0435   WBC 10.21 9.57   HGB 10.9* 11.4*   HCT 34.4* 35.8*    230     CMP:   Recent Labs   Lab 05/04/22  0821 05/05/22  0435   * 140   K 4.6 4.5   CL 97 104   CO2 26 23   * 375*   BUN 25* 19   CREATININE 1.7* 1.4   CALCIUM 8.8 8.3*   ANIONGAP 12 13   EGFRNONAA 29* 37*       Significant Imaging: I have reviewed all pertinent imaging results/findings within the past 24 hours.

## 2022-05-05 NOTE — ASSESSMENT & PLAN NOTE
Chronic pain   - Continue home regimen with norco 10/325 mg q8h PRN, methocarbamol 500 mg q8h PRN and lyrica - renally dose at 150 mg BID   Detail Level: Zone Detail Level: Detailed

## 2022-05-05 NOTE — ASSESSMENT & PLAN NOTE
- Uncontrolled. A1c 11.6.   - Glucose 648 without AGMA. Now improved  - Continue levemir 20U BID. Aspart decreased to 8U TIDWM  - Continue moderate dose SSI  - Stop IVF  - Consistent carb diet

## 2022-05-05 NOTE — PROGRESS NOTES
[facility], [place], operated by [facility]  Progress Note    Patient Name: Indira Waters  MRN: [id number]  Patient Class: OP- Observation   Admission Date: 5/4/2022  Length of Stay: 0 days  Attending Physician: Keila Chan MD  Primary Care Provider: Chun Zapata MD        Subjective:     Principal Problem:Type 2 diabetes mellitus with hyperglycemia        HPI:  76 y/o F with history of DM2, HTN, HLD, CAD, fibromyalgia, and chronic pain who presented for outpatient coronary angiography for evaluation of angina. She was found to have elevated creatinine and hyperglycemia so referred for admission and treatment. She received benadryl and diazepam prior to planned procedure. She is drowsy at the time of my evaluation so history obtained from her daughter at bedside. She reports compliance with home medications, including insulin but she has had uncontrolled blood glucose for the past several weeks (often >300). Daughter reports she has symptoms of hypoglycemia when her glucose is low/normal so she often runs high. Patient has complained of dry mouth and polyuria lately. Currently chest pain free. Patient complains of dysuria but reports this is chronic. Denies fever or chills. ROS limited       Overview/Hospital Course:  No notes on file    Interval History: Overnight pt with wheezing and hypoxia. O2 sats down to 86% on RA, improved on 4L NC. Also with confusion and hallucinations overnight. Daughter reports she had similar episode of confusion on prior hospitalization. Updated on POC. Questions answered.    Review of Systems   Constitutional:  Negative for chills and fever.   Respiratory:  Positive for shortness of breath and wheezing.    Cardiovascular:  Negative for leg swelling.   Gastrointestinal:  Negative for nausea and vomiting.   Psychiatric/Behavioral:  Positive for hallucinations.    Objective:     Vital Signs (Most Recent):  Temp: (!) 101.1 °F (38.4 °C) (05/05/22 1619)  Pulse: 79  (05/05/22 1619)  Resp: 18 (05/05/22 1619)  BP: (!) 126/58 (05/05/22 1619)  SpO2: 98 % (05/05/22 1619)   Vital Signs (24h Range):  Temp:  [96.8 °F (36 °C)-101.1 °F (38.4 °C)] 101.1 °F (38.4 °C)  Pulse:  [69-97] 79  Resp:  [16-26] 18  SpO2:  [86 %-98 %] 98 %  BP: (114-217)/(56-93) 126/58     Weight: 84.2 kg (185 lb 10 oz)  Body mass index is 36.25 kg/m².    Intake/Output Summary (Last 24 hours) at 5/5/2022 1622  Last data filed at 5/5/2022 1529  Gross per 24 hour   Intake 2280 ml   Output 1000 ml   Net 1280 ml      Physical Exam  Vitals and nursing note reviewed.   Constitutional:       General: She is not in acute distress.     Appearance: She is well-developed. She is obese.      Interventions: Nasal cannula in place.   Cardiovascular:      Rate and Rhythm: Normal rate and regular rhythm.      Pulses: Normal pulses.   Pulmonary:      Effort: Pulmonary effort is normal.      Breath sounds: Rales present.      Comments: B/l rales noted  Abdominal:      General: Bowel sounds are normal.      Palpations: Abdomen is soft.      Tenderness: There is no abdominal tenderness.   Musculoskeletal:         General: Normal range of motion.      Right lower leg: No edema.      Left lower leg: No edema.   Skin:     General: Skin is warm and dry.   Neurological:      General: No focal deficit present.      Mental Status: She is alert and oriented to person, place, and time.      Comments: Slowed response but A&O   Psychiatric:         Attention and Perception: She is inattentive. She perceives visual hallucinations.       Significant Labs: All pertinent labs within the past 24 hours have been reviewed.  CBC:   Recent Labs   Lab 05/04/22 0821 05/05/22 0435   WBC 10.21 9.57   HGB 10.9* 11.4*   HCT 34.4* 35.8*    230     CMP:   Recent Labs   Lab 05/04/22  0821 05/05/22  0435   * 140   K 4.6 4.5   CL 97 104   CO2 26 23   * 375*   BUN 25* 19   CREATININE 1.7* 1.4   CALCIUM 8.8 8.3*   ANIONGAP 12 13   EGFRNONAA 29*  37*       Significant Imaging: I have reviewed all pertinent imaging results/findings within the past 24 hours.      Assessment/Plan:      * Type 2 diabetes mellitus with hyperglycemia  - Uncontrolled. A1c 11.6.   - Glucose 648 without AGMA. Now improved  - Continue levemir 20U BID. Aspart decreased to 8U TIDWM  - Continue moderate dose SSI  - Stop IVF  - Consistent carb diet    Delirium  - With hallucinations and confusion that waxes and wanes  - Suspect hospital/mediation induced  - Delirium precautions ordered    Acute hypoxemic respiratory failure  Acute on chronic diastolic CHF  - Hypoxic overnight with RA sats down to 86%. Improved with oxygen protocol. Continue  - CXR with mild interstitial edema. BNP 1120. Rales on exam  - Due to IVF which have been stopped  - Give lasix 40 mg IV x 1 and re-evalaute this afternoon  - Echo in 2021 with grade I diastolic dysfunction and normal EF. Repeat echo    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 pectoris  - Planned for LHC today but cancelled due to hyperglycemia and RANJEET  - Continue aspirin, plavix, metoprolol  - Nitroglycerin PRN  - NPO after midnight in case LHC can be done  - Cardiology following    RANJEET (acute kidney injury)  - Baseline Cr 1.0 - 1.3  - Presents with Cr. 1.7  - Suspect due to volume depletion from hyperglycemia  - Cr down trending with IVF to 1.4. IVF stopped  - Hold ARB and lasix. Renally dose meds    Essential hypertension  - Continue clonidine, metoprolol, and nifedipine as BP allows    Fibromyalgia  Chronic pain   - Continue home regimen with norco 10/325 mg q8h PRN, methocarbamol 500 mg q8h PRN and lyrica - renally dose at 150 mg BID    VTE Risk Mitigation (From admission, onward)         Ordered     heparin (porcine) injection 5,000 Units  Every 8 hours         05/04/22 0931     IP VTE HIGH RISK PATIENT  Once         05/04/22 0931     Place sequential compression device  Until discontinued         05/04/22 0931                   Keila Chan MD  Department of Hospital Medicine   [facility] - Med Surg ([facility])

## 2022-05-05 NOTE — PROGRESS NOTES
05/04/22 2000   Obey Risk Assessment   Sensory Perception 3-->slightly limited   Moisture 4-->rarely moist   Activity 3-->walks occasionally  (with rolling walker)   Mobility 3-->slightly limited   Nutrition 3-->adequate   Friction and Shear 2-->potential problem   Obey Score 18   [facility] ([facility])  Wound Care  Progress Note    Patient Name: Indira Waters  MRN: [id number]  Admission Date: 5/4/2022  Hospital Length of Stay: 0 days  Attending Physician: Keila Chan MD  Primary Care Provider: Chun Zapata MD   No new subjective & objective note has been filed under this hospital service since the last note was generated.    Assessment/Plan:       HAPI prevention,Obey<18 PIP orders placed     Ella Moran LPN  Wound Care  [facility] ([facility])

## 2022-05-05 NOTE — NURSING
Patient's daughter took patient to the BR using her rolling walker and took her back to bed.  I came to assist her in getting the patient pulled up and situated in the bed.  I lied the bed down flat.  The patient, when asked asked, said that she would rather use her legs and push herself up instead of us using the draw sheet to pull her up, she did so with little effort.  Her daughter and I put the HOB up and put her pillows in place behind her head and under her arms.  Patient seemed to be in no distress, but a minute or two later I noticed a tremor.  I went to get the accucheck machine and returned to the room.  Her blood glucose was 345.  She was still tremulous, I asked the patient if she was cold, she said yes.  I excused myself to go call ZUHAIR Smallwood for insulin orders and to get the patient a blanket.  Upon my arrival back to the room.  The patient had new onset audible wheezing, new confusion/hallucinations, and she was more pale than before.  I went to go get a Dynamap and called ZUHAIR Smallwood and charge nurse, Soha Holloway, who came to the bedside quickly.  The patient received 6 units of Novolog.   VS were taken.  Patient's O2 sats had dropped to 86% on RA and she was hypertensive.  She was put on 4L NC and came up to 93%.  Please see new orders that ZUHAIR Smallwood placed at that time.  RT, phlebotomy, and radiology all notified of their new stat orders and arrived to the room soon after to perform their interventions.  Patient stabilized, but still confused.  Daughter at bedside, will continue to monitor.

## 2022-05-05 NOTE — PROGRESS NOTES
IP Liaison - Initial Visit Note    Patient: Indira Waters  MRN:  [id number]  Date of Service:  5/5/2022  Completed by:  EDGAR Obrien    Reason for Visit   Patient presents with    IP Liaison Initial Visit       RSW met with patient at bedside in order to complete SDOH questionnaire and liaison assessment.  Pt has identified no barriers to care.    The following were addressed during this visit:  - Review SDOH Questions   - Complete patient assessment   - Review and discuss options for social groups or events   - Review and discuss options to become more physically active   - Complete initial visit with patient        Patient Summary     IP Liaison Patient Assessment    General  Level of Caregiver support: Member independent and does not need caregiver assistance  Have you had to make a decision between paying for any of the following in the last 2 months?: None  Transportation means: Family  Employment status: Retired and not working  Do you have any of the following?: Medical power of   Current symptoms: Confusion  Assessments  Was the PHQ Depression Screening completed this visit?: Yes  Was the AD-7 Screening completed this visit?: No         EDGAR Obrien

## 2022-05-06 ENCOUNTER — PES CALL (OUTPATIENT)
Dept: ADMINISTRATIVE | Facility: CLINIC | Age: 77
End: 2022-05-06
Payer: MEDICARE

## 2022-05-06 ENCOUNTER — PATIENT OUTREACH (OUTPATIENT)
Dept: ADMINISTRATIVE | Facility: OTHER | Age: 77
End: 2022-05-06
Payer: MEDICARE

## 2022-05-06 LAB
ANION GAP SERPL CALC-SCNC: 12 MMOL/L (ref 8–16)
ASCENDING AORTA: 2.41 CM
AV INDEX (PROSTH): 0.97
AV MEAN GRADIENT: 4 MMHG
AV PEAK GRADIENT: 6 MMHG
AV VALVE AREA: 1.54 CM2
AV VELOCITY RATIO: 0.9
BASOPHILS # BLD AUTO: 0.01 K/UL (ref 0–0.2)
BASOPHILS NFR BLD: 0.2 % (ref 0–1.9)
BSA FOR ECHO PROCEDURE: 1.88 M2
BUN SERPL-MCNC: 22 MG/DL (ref 8–23)
CALCIUM SERPL-MCNC: 8 MG/DL (ref 8.7–10.5)
CHLORIDE SERPL-SCNC: 105 MMOL/L (ref 95–110)
CO2 SERPL-SCNC: 24 MMOL/L (ref 23–29)
CREAT SERPL-MCNC: 1.4 MG/DL (ref 0.5–1.4)
CV ECHO LV RWT: 0.72 CM
DIFFERENTIAL METHOD: ABNORMAL
DOP CALC AO PEAK VEL: 1.2 M/S
DOP CALC AO VTI: 29.78 CM
DOP CALC LVOT AREA: 1.6 CM2
DOP CALC LVOT DIAMETER: 1.42 CM
DOP CALC LVOT PEAK VEL: 1.08 M/S
DOP CALC LVOT STROKE VOLUME: 45.95 CM3
DOP CALCLVOT PEAK VEL VTI: 29.03 CM
E WAVE DECELERATION TIME: 286.31 MSEC
E/A RATIO: 1.01
E/E' RATIO: 15.43 M/S
ECHO LV POSTERIOR WALL: 1.23 CM (ref 0.6–1.1)
EJECTION FRACTION: 55 %
EOSINOPHIL # BLD AUTO: 0 K/UL (ref 0–0.5)
EOSINOPHIL NFR BLD: 0.2 % (ref 0–8)
ERYTHROCYTE [DISTWIDTH] IN BLOOD BY AUTOMATED COUNT: 14.4 % (ref 11.5–14.5)
EST. GFR  (AFRICAN AMERICAN): 42 ML/MIN/1.73 M^2
EST. GFR  (NON AFRICAN AMERICAN): 37 ML/MIN/1.73 M^2
FRACTIONAL SHORTENING: 29 % (ref 28–44)
GLUCOSE SERPL-MCNC: 313 MG/DL (ref 70–110)
HCT VFR BLD AUTO: 29.4 % (ref 37–48.5)
HGB BLD-MCNC: 9.5 G/DL (ref 12–16)
IMM GRANULOCYTES # BLD AUTO: 0.03 K/UL (ref 0–0.04)
IMM GRANULOCYTES NFR BLD AUTO: 0.5 % (ref 0–0.5)
INTERVENTRICULAR SEPTUM: 1.18 CM (ref 0.6–1.1)
IVRT: 83.04 MSEC
LA MAJOR: 5.74 CM
LA MINOR: 5.15 CM
LA WIDTH: 3.45 CM
LEFT ATRIUM SIZE: 3.92 CM
LEFT ATRIUM VOLUME INDEX MOD: 30.4 ML/M2
LEFT ATRIUM VOLUME INDEX: 34.5 ML/M2
LEFT ATRIUM VOLUME MOD: 55 CM3
LEFT ATRIUM VOLUME: 62.41 CM3
LEFT INTERNAL DIMENSION IN SYSTOLE: 2.42 CM (ref 2.1–4)
LEFT VENTRICLE DIASTOLIC VOLUME INDEX: 26.22 ML/M2
LEFT VENTRICLE DIASTOLIC VOLUME: 47.46 ML
LEFT VENTRICLE MASS INDEX: 72 G/M2
LEFT VENTRICLE SYSTOLIC VOLUME INDEX: 11.4 ML/M2
LEFT VENTRICLE SYSTOLIC VOLUME: 20.64 ML
LEFT VENTRICULAR INTERNAL DIMENSION IN DIASTOLE: 3.4 CM (ref 3.5–6)
LEFT VENTRICULAR MASS: 131.07 G
LV LATERAL E/E' RATIO: 12 M/S
LV SEPTAL E/E' RATIO: 21.6 M/S
LYMPHOCYTES # BLD AUTO: 1.5 K/UL (ref 1–4.8)
LYMPHOCYTES NFR BLD: 24.2 % (ref 18–48)
MAGNESIUM SERPL-MCNC: 2.3 MG/DL (ref 1.6–2.6)
MCH RBC QN AUTO: 28.4 PG (ref 27–31)
MCHC RBC AUTO-ENTMCNC: 32.3 G/DL (ref 32–36)
MCV RBC AUTO: 88 FL (ref 82–98)
MONOCYTES # BLD AUTO: 1 K/UL (ref 0.3–1)
MONOCYTES NFR BLD: 16.2 % (ref 4–15)
MV PEAK A VEL: 1.07 M/S
MV PEAK E VEL: 1.08 M/S
MV STENOSIS PRESSURE HALF TIME: 83.03 MS
MV VALVE AREA P 1/2 METHOD: 2.65 CM2
NEUTROPHILS # BLD AUTO: 3.7 K/UL (ref 1.8–7.7)
NEUTROPHILS NFR BLD: 58.7 % (ref 38–73)
NRBC BLD-RTO: 0 /100 WBC
PHOSPHATE SERPL-MCNC: 3.1 MG/DL (ref 2.7–4.5)
PISA MRMAX VEL: 0.02 M/S
PISA TR MAX VEL: 2.56 M/S
PLATELET # BLD AUTO: 166 K/UL (ref 150–450)
PMV BLD AUTO: 12 FL (ref 9.2–12.9)
POCT GLUCOSE: 212 MG/DL (ref 70–110)
POCT GLUCOSE: 260 MG/DL (ref 70–110)
POCT GLUCOSE: 298 MG/DL (ref 70–110)
POCT GLUCOSE: 340 MG/DL (ref 70–110)
POCT GLUCOSE: 359 MG/DL (ref 70–110)
POTASSIUM SERPL-SCNC: 4 MMOL/L (ref 3.5–5.1)
RA MAJOR: 4.46 CM
RA PRESSURE: 3 MMHG
RA WIDTH: 2.55 CM
RBC # BLD AUTO: 3.35 M/UL (ref 4–5.4)
RIGHT VENTRICULAR END-DIASTOLIC DIMENSION: 3.37 CM
SINUS: 2.94 CM
SODIUM SERPL-SCNC: 141 MMOL/L (ref 136–145)
STJ: 2.16 CM
TDI LATERAL: 0.09 M/S
TDI SEPTAL: 0.05 M/S
TDI: 0.07 M/S
TR MAX PG: 26 MMHG
TRICUSPID ANNULAR PLANE SYSTOLIC EXCURSION: 1.56 CM
TV REST PULMONARY ARTERY PRESSURE: 29 MMHG
WBC # BLD AUTO: 6.36 K/UL (ref 3.9–12.7)

## 2022-05-06 PROCEDURE — 93454 CORONARY ARTERY ANGIO S&I: CPT | Mod: 26,,, | Performed by: INTERNAL MEDICINE

## 2022-05-06 PROCEDURE — C1894 INTRO/SHEATH, NON-LASER: HCPCS | Performed by: INTERNAL MEDICINE

## 2022-05-06 PROCEDURE — 85025 COMPLETE CBC W/AUTO DIFF WBC: CPT | Performed by: INTERNAL MEDICINE

## 2022-05-06 PROCEDURE — 25000003 PHARM REV CODE 250: Performed by: INTERNAL MEDICINE

## 2022-05-06 PROCEDURE — 80048 BASIC METABOLIC PNL TOTAL CA: CPT | Performed by: INTERNAL MEDICINE

## 2022-05-06 PROCEDURE — 94761 N-INVAS EAR/PLS OXIMETRY MLT: CPT

## 2022-05-06 PROCEDURE — 93454 PR CATH PLACE/CORONARY ANGIO, IMG SUPER/INTERP: ICD-10-PCS | Mod: 26,,, | Performed by: INTERNAL MEDICINE

## 2022-05-06 PROCEDURE — C1887 CATHETER, GUIDING: HCPCS | Performed by: INTERNAL MEDICINE

## 2022-05-06 PROCEDURE — 99232 SBSQ HOSP IP/OBS MODERATE 35: CPT | Mod: ,,, | Performed by: INTERNAL MEDICINE

## 2022-05-06 PROCEDURE — 63600175 PHARM REV CODE 636 W HCPCS: Performed by: INTERNAL MEDICINE

## 2022-05-06 PROCEDURE — 93454 CORONARY ARTERY ANGIO S&I: CPT | Performed by: INTERNAL MEDICINE

## 2022-05-06 PROCEDURE — 99232 PR SUBSEQUENT HOSPITAL CARE,LEVL II: ICD-10-PCS | Mod: ,,, | Performed by: INTERNAL MEDICINE

## 2022-05-06 PROCEDURE — 84100 ASSAY OF PHOSPHORUS: CPT | Performed by: INTERNAL MEDICINE

## 2022-05-06 PROCEDURE — 99233 SBSQ HOSP IP/OBS HIGH 50: CPT | Mod: 25,,, | Performed by: INTERNAL MEDICINE

## 2022-05-06 PROCEDURE — 21400001 HC TELEMETRY ROOM

## 2022-05-06 PROCEDURE — 99152 MOD SED SAME PHYS/QHP 5/>YRS: CPT | Performed by: INTERNAL MEDICINE

## 2022-05-06 PROCEDURE — C1769 GUIDE WIRE: HCPCS | Performed by: INTERNAL MEDICINE

## 2022-05-06 PROCEDURE — 99233 PR SUBSEQUENT HOSPITAL CARE,LEVL III: ICD-10-PCS | Mod: 25,,, | Performed by: INTERNAL MEDICINE

## 2022-05-06 PROCEDURE — 99153 MOD SED SAME PHYS/QHP EA: CPT | Performed by: INTERNAL MEDICINE

## 2022-05-06 PROCEDURE — 36415 COLL VENOUS BLD VENIPUNCTURE: CPT | Performed by: INTERNAL MEDICINE

## 2022-05-06 PROCEDURE — 83735 ASSAY OF MAGNESIUM: CPT | Performed by: INTERNAL MEDICINE

## 2022-05-06 RX ORDER — CLONIDINE HYDROCHLORIDE 0.1 MG/1
0.1 TABLET ORAL 2 TIMES DAILY
Status: DISCONTINUED | OUTPATIENT
Start: 2022-05-06 | End: 2022-05-07 | Stop reason: HOSPADM

## 2022-05-06 RX ORDER — NIFEDIPINE 30 MG/1
30 TABLET, EXTENDED RELEASE ORAL 2 TIMES DAILY
Status: DISCONTINUED | OUTPATIENT
Start: 2022-05-06 | End: 2022-05-07 | Stop reason: HOSPADM

## 2022-05-06 RX ORDER — ACETAMINOPHEN 325 MG/1
650 TABLET ORAL EVERY 4 HOURS PRN
Status: DISCONTINUED | OUTPATIENT
Start: 2022-05-06 | End: 2022-05-07 | Stop reason: HOSPADM

## 2022-05-06 RX ORDER — ONDANSETRON 8 MG/1
8 TABLET, ORALLY DISINTEGRATING ORAL EVERY 8 HOURS PRN
Status: DISCONTINUED | OUTPATIENT
Start: 2022-05-06 | End: 2022-05-07 | Stop reason: HOSPADM

## 2022-05-06 RX ORDER — INSULIN ASPART 100 [IU]/ML
5 INJECTION, SOLUTION INTRAVENOUS; SUBCUTANEOUS
Status: DISCONTINUED | OUTPATIENT
Start: 2022-05-06 | End: 2022-05-07 | Stop reason: HOSPADM

## 2022-05-06 RX ORDER — LIDOCAINE HYDROCHLORIDE 10 MG/ML
INJECTION INFILTRATION; PERINEURAL
Status: DISCONTINUED | OUTPATIENT
Start: 2022-05-06 | End: 2022-05-06 | Stop reason: HOSPADM

## 2022-05-06 RX ORDER — MIDAZOLAM HYDROCHLORIDE 1 MG/ML
INJECTION, SOLUTION INTRAMUSCULAR; INTRAVENOUS
Status: DISCONTINUED | OUTPATIENT
Start: 2022-05-06 | End: 2022-05-06 | Stop reason: HOSPADM

## 2022-05-06 RX ORDER — METOPROLOL SUCCINATE 50 MG/1
50 TABLET, EXTENDED RELEASE ORAL 2 TIMES DAILY
Status: DISCONTINUED | OUTPATIENT
Start: 2022-05-06 | End: 2022-05-07 | Stop reason: HOSPADM

## 2022-05-06 RX ORDER — FENTANYL CITRATE 50 UG/ML
INJECTION, SOLUTION INTRAMUSCULAR; INTRAVENOUS
Status: DISCONTINUED | OUTPATIENT
Start: 2022-05-06 | End: 2022-05-06 | Stop reason: HOSPADM

## 2022-05-06 RX ORDER — SODIUM CHLORIDE 9 MG/ML
INJECTION, SOLUTION INTRAVENOUS CONTINUOUS
Status: DISCONTINUED | OUTPATIENT
Start: 2022-05-06 | End: 2022-05-07

## 2022-05-06 RX ADMIN — HYDROCODONE BITARTRATE AND ACETAMINOPHEN 1 TABLET: 10; 325 TABLET ORAL at 09:05

## 2022-05-06 RX ADMIN — PANTOPRAZOLE SODIUM 40 MG: 40 TABLET, DELAYED RELEASE ORAL at 09:05

## 2022-05-06 RX ADMIN — CLONIDINE HYDROCHLORIDE 0.1 MG: 0.1 TABLET ORAL at 09:05

## 2022-05-06 RX ADMIN — INSULIN ASPART 8 UNITS: 100 INJECTION, SOLUTION INTRAVENOUS; SUBCUTANEOUS at 03:05

## 2022-05-06 RX ADMIN — SODIUM CHLORIDE: 0.9 INJECTION, SOLUTION INTRAVENOUS at 09:05

## 2022-05-06 RX ADMIN — INSULIN ASPART 5 UNITS: 100 INJECTION, SOLUTION INTRAVENOUS; SUBCUTANEOUS at 06:05

## 2022-05-06 RX ADMIN — NIFEDIPINE 30 MG: 30 TABLET, FILM COATED, EXTENDED RELEASE ORAL at 09:05

## 2022-05-06 RX ADMIN — SERTRALINE 100 MG: 100 TABLET, FILM COATED ORAL at 09:05

## 2022-05-06 RX ADMIN — HYDROCODONE BITARTRATE AND ACETAMINOPHEN 1 TABLET: 10; 325 TABLET ORAL at 12:05

## 2022-05-06 RX ADMIN — CLOPIDOGREL 75 MG: 75 TABLET, FILM COATED ORAL at 09:05

## 2022-05-06 RX ADMIN — METOPROLOL SUCCINATE 50 MG: 50 TABLET, EXTENDED RELEASE ORAL at 09:05

## 2022-05-06 RX ADMIN — SODIUM CHLORIDE: 0.9 INJECTION, SOLUTION INTRAVENOUS at 06:05

## 2022-05-06 RX ADMIN — INSULIN DETEMIR 10 UNITS: 100 INJECTION, SOLUTION SUBCUTANEOUS at 09:05

## 2022-05-06 RX ADMIN — HYDROCODONE BITARTRATE AND ACETAMINOPHEN 1 TABLET: 10; 325 TABLET ORAL at 03:05

## 2022-05-06 RX ADMIN — METHOCARBAMOL TABLETS 500 MG: 500 TABLET, COATED ORAL at 09:05

## 2022-05-06 RX ADMIN — PREGABALIN 150 MG: 75 CAPSULE ORAL at 09:05

## 2022-05-06 RX ADMIN — INSULIN DETEMIR 10 UNITS: 100 INJECTION, SOLUTION SUBCUTANEOUS at 11:05

## 2022-05-06 RX ADMIN — ASPIRIN 81 MG: 81 TABLET, COATED ORAL at 09:05

## 2022-05-06 NOTE — NURSING
Spoke with Jairon Ariza NP about report from day shift nurse that her heparin was held due to bleeding. No bleeding noted at this time, ZUHAIR Ariza NP okay to given 2200 dose of heparin, hold 0600 am dose of heparin. Telephone orders read back.

## 2022-05-06 NOTE — OR NURSING
Patient transported by RN to room 382. Moved to bed with assist of 3. Right groin site remains dry and intact without bleeding or hematoma.  Patient rolled to side and skin check completed with myself and nurse YORDAN Husain. Sacral Mepilex placed by YORDAN Husain.

## 2022-05-06 NOTE — PROGRESS NOTES
[facility]    Admission date:  5/4/2022     Assessment    Acute heart failure  Compensated with minimal diuresis     Atherosclerosis of native coronary artery of native heart with stable angina pectoris  Free of angina.       Essential hypertension  Controlled    Plan and Discussion    Continues to optimize the management of her coronary artery disease.    Subjective    Confused overnight.  No dyspnea.  No orthostasis.    Medications  Current Facility-Administered Medications   Medication Dose Route Frequency Provider Last Rate Last Admin    0.9%  NaCl infusion   Intravenous Continuous Jose L Méndez MD        acetaminophen tablet 650 mg  650 mg Oral Q4H PRN Keila Chan MD   650 mg at 05/05/22 1627    acetaminophen tablet 650 mg  650 mg Oral Q4H PRN Jose L Méndez MD        albuterol-ipratropium 2.5 mg-0.5 mg/3 mL nebulizer solution 3 mL  3 mL Nebulization Q4H PRN Jairon Ariza NP   3 mL at 05/05/22 0422    aluminum-magnesium hydroxide-simethicone 200-200-20 mg/5 mL suspension 30 mL  30 mL Oral QID PRN Keila Chan MD        aspirin EC tablet 81 mg  81 mg Oral Daily Keila Chan MD   81 mg at 05/06/22 0958    cloNIDine tablet 0.1 mg  0.1 mg Oral BID Jose L Méndez MD        clopidogreL tablet 75 mg  75 mg Oral Daily Keila Chan MD   75 mg at 05/06/22 0958    dextrose 10% bolus 125 mL  12.5 g Intravenous PRN Keila Chan MD        dextrose 10% bolus 250 mL  25 g Intravenous PRN Keila Chan MD        fentaNYL 50 mcg/mL injection    PRN Jose L Méndez MD   25 mcg at 05/06/22 1358    glucagon (human recombinant) injection 1 mg  1 mg Intramuscular PRN Keila Chan MD        glucose chewable tablet 16 g  16 g Oral PRN Keila Chan MD        glucose chewable tablet 24 g  24 g Oral PRN Keila Chan MD        HYDROcodone-acetaminophen  mg per tablet 1 tablet  1 tablet Oral Q8H PRN Keila Chan MD   1 tablet at 05/06/22 1225    insulin aspart  U-100 pen 1-10 Units  1-10 Units Subcutaneous QID (AC + HS) PRN Keila Chan MD   2 Units at 05/05/22 2151    insulin detemir U-100 pen 10 Units  10 Units Subcutaneous BID Keila Chan MD   10 Units at 05/06/22 1105    LIDOcaine HCL 10 mg/ml (1%) injection    PRN Jose L Méndez MD   10 mL at 05/06/22 1414    melatonin tablet 6 mg  6 mg Oral Nightly PRN Keila Chan MD        methocarbamoL tablet 500 mg  500 mg Oral TID PRN Keila Chan MD   500 mg at 05/05/22 2148    metoprolol succinate (TOPROL-XL) 24 hr tablet 50 mg  50 mg Oral BID Jose L Méndez MD        midazolam injection    PRN Jose L Méndez MD   1 mg at 05/06/22 1358    naloxone 0.4 mg/mL injection 0.02 mg  0.02 mg Intravenous PRN Keila Chan MD        NIFEdipine 24 hr tablet 30 mg  30 mg Oral BID Jose L Méndez MD        nitroGLYCERIN SL tablet 0.4 mg  0.4 mg Sublingual Q5 Min PRN Keila Chan MD        ondansetron disintegrating tablet 8 mg  8 mg Oral Q8H PRN Jose L Méndez MD        ondansetron injection 4 mg  4 mg Intravenous Q6H PRN Keila Chan MD        pantoprazole EC tablet 40 mg  40 mg Oral Daily Keila Chan MD   40 mg at 05/06/22 0958    polyethylene glycol packet 17 g  17 g Oral Daily PRN Keila Chan MD        pregabalin capsule 150 mg  150 mg Oral BID Keila Chan MD   150 mg at 05/06/22 0958    sertraline tablet 100 mg  100 mg Oral Daily Keila Chan MD   100 mg at 05/06/22 0958    sodium chloride 0.9% flush 10 mL  10 mL Intravenous Q8H PRN Keila Chan MD            Physical Exam    Temp:  [98.1 °F (36.7 °C)-101.1 °F (38.4 °C)]   Pulse:  [59-79]   Resp:  [16-18]   BP: (111-144)/(56-64)   SpO2:  [95 %-100 %]    Wt Readings from Last 3 Encounters:   05/06/22 83.9 kg (185 lb)   04/27/22 81.4 kg (179 lb 8 oz)   04/20/22 84.2 kg (185 lb 10 oz)     Physical Exam  Constitutional:       General: She is not in acute distress.  Neck:      Vascular: No hepatojugular reflux or JVD.    Cardiovascular:      Rate and Rhythm: Normal rate and regular rhythm.      Heart sounds: S1 normal and S2 normal. No murmur heard.    No S3 or S4 sounds.   Pulmonary:      Effort: Pulmonary effort is normal.      Breath sounds: Normal breath sounds and air entry.   Abdominal:      General: Bowel sounds are normal.      Palpations: Abdomen is soft. There is no hepatomegaly.      Tenderness: There is no abdominal tenderness.   Musculoskeletal:      Right lower leg: No edema.      Left lower leg: No edema.   Skin:     Coloration: Skin is not pale.   Neurological:      Mental Status: She is alert.   Psychiatric:         Behavior: Behavior is cooperative.         Telemetry  Sinus rhythm    Labs  Recent Results (from the past 24 hour(s))   POCT glucose    Collection Time: 05/05/22  5:19 PM   Result Value Ref Range    POCT Glucose 66 (L) 70 - 110 mg/dL   CBC auto differential    Collection Time: 05/05/22  5:25 PM   Result Value Ref Range    WBC 9.36 3.90 - 12.70 K/uL    RBC 3.55 (L) 4.00 - 5.40 M/uL    Hemoglobin 10.2 (L) 12.0 - 16.0 g/dL    Hematocrit 31.0 (L) 37.0 - 48.5 %    MCV 87 82 - 98 fL    MCH 28.7 27.0 - 31.0 pg    MCHC 32.9 32.0 - 36.0 g/dL    RDW 14.2 11.5 - 14.5 %    Platelets 204 150 - 450 K/uL    MPV 12.0 9.2 - 12.9 fL    Immature Granulocytes 0.4 0.0 - 0.5 %    Gran # (ANC) 6.7 1.8 - 7.7 K/uL    Immature Grans (Abs) 0.04 0.00 - 0.04 K/uL    Lymph # 1.2 1.0 - 4.8 K/uL    Mono # 1.4 (H) 0.3 - 1.0 K/uL    Eos # 0.0 0.0 - 0.5 K/uL    Baso # 0.02 0.00 - 0.20 K/uL    nRBC 0 0 /100 WBC    Gran % 71.3 38.0 - 73.0 %    Lymph % 13.2 (L) 18.0 - 48.0 %    Mono % 14.6 4.0 - 15.0 %    Eosinophil % 0.3 0.0 - 8.0 %    Basophil % 0.2 0.0 - 1.9 %    Differential Method Automated    Renal function panel    Collection Time: 05/05/22  5:25 PM   Result Value Ref Range    Glucose 83 70 - 110 mg/dL    Sodium 144 136 - 145 mmol/L    Potassium 4.1 3.5 - 5.1 mmol/L    Chloride 108 95 - 110 mmol/L    CO2 25 23 - 29 mmol/L    BUN 23  8 - 23 mg/dL    Calcium 8.5 (L) 8.7 - 10.5 mg/dL    Creatinine 1.4 0.5 - 1.4 mg/dL    Albumin 3.0 (L) 3.5 - 5.2 g/dL    Phosphorus 4.3 2.7 - 4.5 mg/dL    eGFR if African American 42 (A) >60 mL/min/1.73 m^2    eGFR if non African American 37 (A) >60 mL/min/1.73 m^2    Anion Gap 11 8 - 16 mmol/L   POCT glucose    Collection Time: 05/05/22  5:42 PM   Result Value Ref Range    POCT Glucose 126 (H) 70 - 110 mg/dL   Influenza A & B by Molecular    Collection Time: 05/05/22  7:50 PM    Specimen: Nasopharyngeal Swab   Result Value Ref Range    Influenza A, Molecular Negative Negative    Influenza B, Molecular Negative Negative    Flu A & B Source Nasal Swab    POCT glucose    Collection Time: 05/05/22  8:30 PM   Result Value Ref Range    POCT Glucose 212 (H) 70 - 110 mg/dL   Basic Metabolic Panel (BMP)    Collection Time: 05/06/22  4:25 AM   Result Value Ref Range    Sodium 141 136 - 145 mmol/L    Potassium 4.0 3.5 - 5.1 mmol/L    Chloride 105 95 - 110 mmol/L    CO2 24 23 - 29 mmol/L    Glucose 313 (H) 70 - 110 mg/dL    BUN 22 8 - 23 mg/dL    Creatinine 1.4 0.5 - 1.4 mg/dL    Calcium 8.0 (L) 8.7 - 10.5 mg/dL    Anion Gap 12 8 - 16 mmol/L    eGFR if African American 42 (A) >60 mL/min/1.73 m^2    eGFR if non African American 37 (A) >60 mL/min/1.73 m^2   Magnesium    Collection Time: 05/06/22  4:25 AM   Result Value Ref Range    Magnesium 2.3 1.6 - 2.6 mg/dL   Phosphorus    Collection Time: 05/06/22  4:25 AM   Result Value Ref Range    Phosphorus 3.1 2.7 - 4.5 mg/dL   CBC with Automated Differential    Collection Time: 05/06/22  4:25 AM   Result Value Ref Range    WBC 6.36 3.90 - 12.70 K/uL    RBC 3.35 (L) 4.00 - 5.40 M/uL    Hemoglobin 9.5 (L) 12.0 - 16.0 g/dL    Hematocrit 29.4 (L) 37.0 - 48.5 %    MCV 88 82 - 98 fL    MCH 28.4 27.0 - 31.0 pg    MCHC 32.3 32.0 - 36.0 g/dL    RDW 14.4 11.5 - 14.5 %    Platelets 166 150 - 450 K/uL    MPV 12.0 9.2 - 12.9 fL    Immature Granulocytes 0.5 0.0 - 0.5 %    Gran # (ANC) 3.7 1.8 - 7.7  K/uL    Immature Grans (Abs) 0.03 0.00 - 0.04 K/uL    Lymph # 1.5 1.0 - 4.8 K/uL    Mono # 1.0 0.3 - 1.0 K/uL    Eos # 0.0 0.0 - 0.5 K/uL    Baso # 0.01 0.00 - 0.20 K/uL    nRBC 0 0 /100 WBC    Gran % 58.7 38.0 - 73.0 %    Lymph % 24.2 18.0 - 48.0 %    Mono % 16.2 (H) 4.0 - 15.0 %    Eosinophil % 0.2 0.0 - 8.0 %    Basophil % 0.2 0.0 - 1.9 %    Differential Method Automated    POCT glucose    Collection Time: 05/06/22  7:58 AM   Result Value Ref Range    POCT Glucose 298 (H) 70 - 110 mg/dL   Echo    Collection Time: 05/06/22  8:55 AM   Result Value Ref Range    BSA 1.88 m2    TDI SEPTAL 0.05 m/s    LV LATERAL E/E' RATIO 12.00 m/s    LV SEPTAL E/E' RATIO 21.60 m/s    LA WIDTH 3.45 cm    TDI LATERAL 0.09 m/s    LVIDd 3.40 (A) 3.5 - 6.0 cm    IVS 1.18 (A) 0.6 - 1.1 cm    Posterior Wall 1.23 (A) 0.6 - 1.1 cm    LVIDs 2.42 2.1 - 4.0 cm    FS 29 28 - 44 %    LA volume 62.41 cm3    Sinus 2.94 cm    STJ 2.16 cm    Ascending aorta 2.41 cm    LV mass 131.07 g    LA size 3.92 cm    RVDD 3.37 cm    TAPSE 1.56 cm    Left Ventricle Relative Wall Thickness 0.72 cm    AV mean gradient 4 mmHg    AV valve area 1.54 cm2    AV Velocity Ratio 0.90     AV index (prosthetic) 0.97     MV valve area p 1/2 method 2.65 cm2    E/A ratio 1.01     Mean e' 0.07 m/s    E wave deceleration time 286.31 msec    IVRT 83.04 msec    LVOT diameter 1.42 cm    LVOT area 1.6 cm2    LVOT peak calvin 1.08 m/s    LVOT peak VTI 29.03 cm    Ao peak calvin 1.20 m/s    Ao VTI 29.78 cm    Mr max calvin 0.02 m/s    LVOT stroke volume 45.95 cm3    AV peak gradient 6 mmHg    E/E' ratio 15.43 m/s    MV Peak E Calvin 1.08 m/s    TR Max Calvin 2.56 m/s    MV stenosis pressure 1/2 time 83.03 ms    MV Peak A Calvin 1.07 m/s    LV Systolic Volume 20.64 mL    LV Systolic Volume Index 11.4 mL/m2    LV Diastolic Volume 47.46 mL    LV Diastolic Volume Index 26.22 mL/m2    LA Volume Index 34.5 mL/m2    LV Mass Index 72 g/m2    RA Major Axis 4.46 cm    Left Atrium Minor Axis 5.15 cm    Left Atrium  Major Axis 5.74 cm    Triscuspid Valve Regurgitation Peak Gradient 26 mmHg    LA Volume Index (Mod) 30.4 mL/m2    LA volume (mod) 55.00 cm3    RA Width 2.55 cm   POCT glucose    Collection Time: 05/06/22 12:41 PM   Result Value Ref Range    POCT Glucose 359 (H) 70 - 110 mg/dL             Jose L Méndez MD

## 2022-05-06 NOTE — ASSESSMENT & PLAN NOTE
- Baseline Cr 1.0 - 1.3  - Presents with Cr. 1.7  - Suspect due to volume depletion from hyperglycemia  - Cr down trending with IVF to 1.4. IVF stopped. Cr remains stable  - Renally dose meds. Resume lasix and ARB after procedure

## 2022-05-06 NOTE — ASSESSMENT & PLAN NOTE
- Uncontrolled. A1c 11.6.   - Glucose 648 without AGMA. Now improved  - Continue levemir 10U BID. Aspart stopped for now  - Continue moderate dose SSI  - Consistent carb diet

## 2022-05-06 NOTE — ASSESSMENT & PLAN NOTE
Acute on chronic diastolic CHF  - Hypoxic overnight with RA sats down to 86%. Improved with oxygen protocol. Continue  - CXR with mild interstitial edema. BNP 1120. Rales on exam  - Due to IVF which have been stopped  - Improvement with lasix IV x 2. Oxygen weaned from 4L to 1L. Continue to wean as able  - Echo in 2021 with grade I diastolic dysfunction and normal EF. Repeat echo pending  - PT/OT eval

## 2022-05-06 NOTE — OR NURSING
Patient received from Cath Lab with right wrist TR band in place. Reported by Cath Lab nurse that right radial stick was venous, 3cc in band. TR band removed, site cleaned and bandaid applied. Will monitor site in PACU.  Right groin site with gauze and tegaderm dressing dry and intact. No bleeding or hematoma, soft to paljpation. Ice pack to site. Will monitor site while in PACU.

## 2022-05-06 NOTE — PLAN OF CARE
Problem: Adult Inpatient Plan of Care  Goal: Plan of Care Review  Outcome: Ongoing, Progressing  Goal: Patient-Specific Goal (Individualized)  Outcome: Ongoing, Progressing  Goal: Absence of Hospital-Acquired Illness or Injury  Outcome: Ongoing, Progressing  Goal: Optimal Comfort and Wellbeing  Outcome: Ongoing, Progressing  Goal: Readiness for Transition of Care  Outcome: Ongoing, Progressing     Problem: Diabetes Comorbidity  Goal: Blood Glucose Level Within Targeted Range  Outcome: Ongoing, Progressing     Problem: Fluid and Electrolyte Imbalance (Acute Kidney Injury/Impairment)  Goal: Fluid and Electrolyte Balance  Outcome: Ongoing, Progressing     Problem: Oral Intake Inadequate (Acute Kidney Injury/Impairment)  Goal: Optimal Nutrition Intake  Outcome: Ongoing, Progressing     Problem: Renal Function Impairment (Acute Kidney Injury/Impairment)  Goal: Effective Renal Function  Outcome: Ongoing, Progressing     Problem: Fall Injury Risk  Goal: Absence of Fall and Fall-Related Injury  Outcome: Ongoing, Progressing     Problem: Skin Injury Risk Increased  Goal: Skin Health and Integrity  Outcome: Ongoing, Progressing

## 2022-05-06 NOTE — ASSESSMENT & PLAN NOTE
- Planned for C today but cancelled due to hyperglycemia and RANJEET  - Continue aspirin, plavix, metoprolol  - Nitroglycerin PRN  - Cardiology following. Cardiac cath today

## 2022-05-06 NOTE — OR NURSING
Report called to R[facility] on [address].  VSS. Right groin with dry and intact dressing. No hematoma or bleeding, soft to palpation.  Will transport patient on 2L O2 via stretcher with RN.

## 2022-05-06 NOTE — HOSPITAL COURSE
Patient presented for cardiac cath for evaluation of stable angina but was found to have elevated Cr and hyperglycemia (glucose >600) on pre-op labs. She was admitted to hospital medicine and started on IVF for RANJEET and insulin for hyperglycemia. Overnight she developed acute onset dyspnea and hypoxia requiring supplemental oxygen. She developed pulmonary edema related to IVF so fluids held and she was started on IV lasix with improvement in symptoms. LHC done on 5/6 which showed diffuse diabetic small vessel disease. She was treated with continued medical management. She had improvement in respiratory symptoms with diuresis and weaned off oxygen. Home lasix regimen was resumed. She was evaluated by PT/OT and recommended discharge home. She is stable and ready for discharge home today. Discussed importance of improved glucose control with patient and her 3 daughters

## 2022-05-06 NOTE — SUBJECTIVE & OBJECTIVE
Interval History: No acute events overnight. She had fever of 101 F x 1 yesterday. She has been afebrile since. SOB has improved. Denies cough, new rash, dysuria. Flu screen negative. Weaned down to 1L NC. Awaiting [facility] today.    Review of Systems   Constitutional:  Negative for chills and fever.   Respiratory:  Negative for shortness of breath.    Cardiovascular:  Negative for leg swelling.   Gastrointestinal:  Negative for nausea and vomiting.   Objective:     Vital Signs (Most Recent):  Temp: 98.6 °F (37 °C) (05/06/22 0747)  Pulse: 62 (05/06/22 1200)  Resp: 18 (05/06/22 0747)  BP: 130/60 (05/06/22 0800)  SpO2: 97 % (05/06/22 0747)   Vital Signs (24h Range):  Temp:  [98.6 °F (37 °C)-101.1 °F (38.4 °C)] 98.6 °F (37 °C)  Pulse:  [62-80] 62  Resp:  [16-18] 18  SpO2:  [95 %-100 %] 97 %  BP: (111-134)/(56-61) 130/60     Weight: 83.9 kg (185 lb)  Body mass index is 36.13 kg/m².    Intake/Output Summary (Last 24 hours) at 5/6/2022 1218  Last data filed at 5/6/2022 0600  Gross per 24 hour   Intake 600 ml   Output 1700 ml   Net -1100 ml        Physical Exam  Vitals and nursing note reviewed.   Constitutional:       General: She is not in acute distress.     Appearance: She is well-developed. She is obese.      Interventions: Nasal cannula in place.   Cardiovascular:      Rate and Rhythm: Normal rate and regular rhythm.      Pulses: Normal pulses.   Pulmonary:      Effort: Pulmonary effort is normal. No respiratory distress.      Breath sounds: Normal breath sounds. No rales.   Abdominal:      General: Bowel sounds are normal.      Palpations: Abdomen is soft.      Tenderness: There is no abdominal tenderness.   Musculoskeletal:      Right lower leg: No edema.      Left lower leg: No edema.   Skin:     General: Skin is warm and dry.   Neurological:      General: No focal deficit present.      Mental Status: She is alert and oriented to person, place, and time.      Comments: Slowed response but A&O   Psychiatric:          Attention and Perception: She is inattentive. She perceives visual hallucinations.       Significant Labs: All pertinent labs within the past 24 hours have been reviewed.  CBC:   Recent Labs   Lab 05/05/22 0435 05/05/22 1725 05/06/22  0425   WBC 9.57 9.36 6.36   HGB 11.4* 10.2* 9.5*   HCT 35.8* 31.0* 29.4*    204 166       CMP:   Recent Labs   Lab 05/05/22 0435 05/05/22 1725 05/06/22  0425    144 141   K 4.5 4.1 4.0    108 105   CO2 23 25 24   * 83 313*   BUN 19 23 22   CREATININE 1.4 1.4 1.4   CALCIUM 8.3* 8.5* 8.0*   ALBUMIN  --  3.0*  --    ANIONGAP 13 11 12   EGFRNONAA 37* 37* 37*         Significant Imaging: I have reviewed all pertinent imaging results/findings within the past 24 hours.

## 2022-05-06 NOTE — PT/OT/SLP PROGRESS
Physical Therapy  Not Seen    Patient Name:  Indira Waters   MRN:  [id number]    Patient not seen today secondary to Off the floor for procedure/surgery. Will follow-up tomorrow, 5/7.

## 2022-05-06 NOTE — PROGRESS NOTES
[facility] Medicine  Progress Note    Patient Name: Indira Waters  MRN: [id number]  Patient Class: IP- Inpatient   Admission Date: 5/4/2022  Length of Stay: 1 days  Attending Physician: Keila Chan MD  Primary Care Provider: Chun Zapata MD        Subjective:     Principal Problem:Type 2 diabetes mellitus with hyperglycemia        HPI:  76 y/o F with history of DM2, HTN, HLD, CAD, fibromyalgia, and chronic pain who presented for outpatient coronary angiography for evaluation of angina. She was found to have elevated creatinine and hyperglycemia so referred for admission and treatment. She received benadryl and diazepam prior to planned procedure. She is drowsy at the time of my evaluation so history obtained from her daughter at bedside. She reports compliance with home medications, including insulin but she has had uncontrolled blood glucose for the past several weeks (often >300). Daughter reports she has symptoms of hypoglycemia when her glucose is low/normal so she often runs high. Patient has complained of dry mouth and polyuria lately. Currently chest pain free. Patient complains of dysuria but reports this is chronic. Denies fever or chills. ROS limited       Overview/Hospital Course:  Patient presented for cardiac cath for evaluation of stable angina but was found to have elevated Cr and hyperglycemia (glucose >600) on pre-op labs. She was admitted to hospital medicine and started on IVF for RANJEET and insulin for hyperglycemia. Overnight she developed acute onset dyspnea and hypoxia requiring supplemental oxygen. She developed pulmonary edema related to IVF so fluids held and she was started on IV lasix with improvement in symptoms. [facility] planned for 5/6      Interval History: No acute events overnight. She had fever of 101 F x 1 yesterday. She has been afebrile since. SOB has improved. Denies cough, new rash, dysuria. Flu screen negative. Weaned down to 1L NC.  Awaiting [facility] today.    Review of Systems   Constitutional:  Negative for chills and fever.   Respiratory:  Negative for shortness of breath.    Cardiovascular:  Negative for leg swelling.   Gastrointestinal:  Negative for nausea and vomiting.   Objective:     Vital Signs (Most Recent):  Temp: 98.6 °F (37 °C) (05/06/22 0747)  Pulse: 62 (05/06/22 1200)  Resp: 18 (05/06/22 0747)  BP: 130/60 (05/06/22 0800)  SpO2: 97 % (05/06/22 0747)   Vital Signs (24h Range):  Temp:  [98.6 °F (37 °C)-101.1 °F (38.4 °C)] 98.6 °F (37 °C)  Pulse:  [62-80] 62  Resp:  [16-18] 18  SpO2:  [95 %-100 %] 97 %  BP: (111-134)/(56-61) 130/60     Weight: 83.9 kg (185 lb)  Body mass index is 36.13 kg/m².    Intake/Output Summary (Last 24 hours) at 5/6/2022 1218  Last data filed at 5/6/2022 0600  Gross per 24 hour   Intake 600 ml   Output 1700 ml   Net -1100 ml        Physical Exam  Vitals and nursing note reviewed.   Constitutional:       General: She is not in acute distress.     Appearance: She is well-developed. She is obese.      Interventions: Nasal cannula in place.   Cardiovascular:      Rate and Rhythm: Normal rate and regular rhythm.      Pulses: Normal pulses.   Pulmonary:      Effort: Pulmonary effort is normal. No respiratory distress.      Breath sounds: Normal breath sounds. No rales.   Abdominal:      General: Bowel sounds are normal.      Palpations: Abdomen is soft.      Tenderness: There is no abdominal tenderness.   Musculoskeletal:      Right lower leg: No edema.      Left lower leg: No edema.   Skin:     General: Skin is warm and dry.   Neurological:      General: No focal deficit present.      Mental Status: She is alert and oriented to person, place, and time.      Comments: Slowed response but A&O   Psychiatric:         Attention and Perception: She is inattentive. She perceives visual hallucinations.       Significant Labs: All pertinent labs within the past 24 hours have been reviewed.  CBC:   Recent Labs   Lab  05/05/22  0435 05/05/22  1725 05/06/22  0425   WBC 9.57 9.36 6.36   HGB 11.4* 10.2* 9.5*   HCT 35.8* 31.0* 29.4*    204 166       CMP:   Recent Labs   Lab 05/05/22  0435 05/05/22  1725 05/06/22  0425    144 141   K 4.5 4.1 4.0    108 105   CO2 23 25 24   * 83 313*   BUN 19 23 22   CREATININE 1.4 1.4 1.4   CALCIUM 8.3* 8.5* 8.0*   ALBUMIN  --  3.0*  --    ANIONGAP 13 11 12   EGFRNONAA 37* 37* 37*         Significant Imaging: I have reviewed all pertinent imaging results/findings within the past 24 hours.      Assessment/Plan:      * Type 2 diabetes mellitus with hyperglycemia  - Uncontrolled. A1c 11.6.   - Glucose 648 without AGMA. Now improved  - Continue levemir 10U BID. Aspart stopped for now  - Continue moderate dose SSI  - Consistent carb diet    Delirium  - With hallucinations and confusion that waxes and wanes  - Suspect hospital/mediation induced  - Delirium precautions ordered    Acute hypoxemic respiratory failure  Acute on chronic diastolic CHF  - Hypoxic overnight with RA sats down to 86%. Improved with oxygen protocol. Continue  - CXR with mild interstitial edema. BNP 1120. Rales on exam  - Due to IVF which have been stopped  - Improvement with lasix IV x 2. Oxygen weaned from 4L to 1L. Continue to wean as able  - Echo in 2021 with grade I diastolic dysfunction and normal EF. Repeat echo pending  - PT/OT eval    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 pectoris  - Planned for [facility] today but cancelled due to hyperglycemia and RANJEET  - Continue aspirin, plavix, metoprolol  - Nitroglycerin PRN  - Cardiology following. Cardiac cath today    RANJEET (acute kidney injury)  - Baseline Cr 1.0 - 1.3  - Presents with Cr. 1.7  - Suspect due to volume depletion from hyperglycemia  - Cr down trending with IVF to 1.4. IVF stopped. Cr remains stable  - Renally dose meds. Resume lasix and ARB after procedure    Essential hypertension  - Continue clonidine,  metoprolol, and nifedipine as BP allows    Fibromyalgia  Chronic pain   - Continue home regimen with norco 10/325 mg q8h PRN, methocarbamol 500 mg q8h PRN and lyrica - renally dose at 150 mg BID    VTE Risk Mitigation (From admission, onward)         Ordered     heparin (porcine) injection 5,000 Units  Every 8 hours         05/04/22 0931     IP VTE HIGH RISK PATIENT  Once         05/04/22 0931     Place sequential compression device  Until discontinued         05/04/22 0931                Keila Chan MD  Department of Hospital Medicine   [facility] ([facility])

## 2022-05-06 NOTE — PT/OT/SLP PROGRESS
Occupational Therapy      Patient Name:  Indira Waters   MRN:  [id number]    Patient not seen today secondary to Off the floor for procedure/surgery. Will follow-up on 5/7/22.    5/6/2022

## 2022-05-07 VITALS
TEMPERATURE: 99 F | HEIGHT: 60 IN | DIASTOLIC BLOOD PRESSURE: 64 MMHG | RESPIRATION RATE: 18 BRPM | OXYGEN SATURATION: 94 % | SYSTOLIC BLOOD PRESSURE: 135 MMHG | BODY MASS INDEX: 36.32 KG/M2 | HEART RATE: 69 BPM | WEIGHT: 185 LBS

## 2022-05-07 PROBLEM — N17.9 AKI (ACUTE KIDNEY INJURY): Status: RESOLVED | Noted: 2022-05-04 | Resolved: 2022-05-07

## 2022-05-07 PROBLEM — J96.01 ACUTE HYPOXEMIC RESPIRATORY FAILURE: Status: RESOLVED | Noted: 2022-05-05 | Resolved: 2022-05-07

## 2022-05-07 PROBLEM — R41.0 DELIRIUM: Status: RESOLVED | Noted: 2022-05-05 | Resolved: 2022-05-07

## 2022-05-07 PROBLEM — I50.33 ACUTE ON CHRONIC DIASTOLIC HEART FAILURE: Status: RESOLVED | Noted: 2021-10-01 | Resolved: 2022-05-07

## 2022-05-07 LAB
ANION GAP SERPL CALC-SCNC: 11 MMOL/L (ref 8–16)
BASOPHILS # BLD AUTO: 0.02 K/UL (ref 0–0.2)
BASOPHILS NFR BLD: 0.3 % (ref 0–1.9)
BUN SERPL-MCNC: 19 MG/DL (ref 8–23)
CALCIUM SERPL-MCNC: 8 MG/DL (ref 8.7–10.5)
CHLORIDE SERPL-SCNC: 109 MMOL/L (ref 95–110)
CO2 SERPL-SCNC: 22 MMOL/L (ref 23–29)
CREAT SERPL-MCNC: 1.2 MG/DL (ref 0.5–1.4)
DIFFERENTIAL METHOD: ABNORMAL
EOSINOPHIL # BLD AUTO: 0.1 K/UL (ref 0–0.5)
EOSINOPHIL NFR BLD: 0.8 % (ref 0–8)
ERYTHROCYTE [DISTWIDTH] IN BLOOD BY AUTOMATED COUNT: 14.3 % (ref 11.5–14.5)
EST. GFR  (AFRICAN AMERICAN): 51 ML/MIN/1.73 M^2
EST. GFR  (NON AFRICAN AMERICAN): 44 ML/MIN/1.73 M^2
GLUCOSE SERPL-MCNC: 289 MG/DL (ref 70–110)
HCT VFR BLD AUTO: 31.1 % (ref 37–48.5)
HGB BLD-MCNC: 10 G/DL (ref 12–16)
IMM GRANULOCYTES # BLD AUTO: 0.02 K/UL (ref 0–0.04)
IMM GRANULOCYTES NFR BLD AUTO: 0.3 % (ref 0–0.5)
LYMPHOCYTES # BLD AUTO: 3 K/UL (ref 1–4.8)
LYMPHOCYTES NFR BLD: 48.4 % (ref 18–48)
MAGNESIUM SERPL-MCNC: 2.2 MG/DL (ref 1.6–2.6)
MCH RBC QN AUTO: 29.2 PG (ref 27–31)
MCHC RBC AUTO-ENTMCNC: 32.2 G/DL (ref 32–36)
MCV RBC AUTO: 91 FL (ref 82–98)
MONOCYTES # BLD AUTO: 0.6 K/UL (ref 0.3–1)
MONOCYTES NFR BLD: 9.7 % (ref 4–15)
NEUTROPHILS # BLD AUTO: 2.5 K/UL (ref 1.8–7.7)
NEUTROPHILS NFR BLD: 40.5 % (ref 38–73)
NRBC BLD-RTO: 0 /100 WBC
PHOSPHATE SERPL-MCNC: 2.7 MG/DL (ref 2.7–4.5)
PLATELET # BLD AUTO: 163 K/UL (ref 150–450)
PMV BLD AUTO: 11.9 FL (ref 9.2–12.9)
POCT GLUCOSE: 156 MG/DL (ref 70–110)
POCT GLUCOSE: 239 MG/DL (ref 70–110)
POCT GLUCOSE: 246 MG/DL (ref 70–110)
POCT GLUCOSE: 289 MG/DL (ref 70–110)
POTASSIUM SERPL-SCNC: 4.1 MMOL/L (ref 3.5–5.1)
RBC # BLD AUTO: 3.43 M/UL (ref 4–5.4)
SODIUM SERPL-SCNC: 142 MMOL/L (ref 136–145)
WBC # BLD AUTO: 6.09 K/UL (ref 3.9–12.7)

## 2022-05-07 PROCEDURE — 99232 PR SUBSEQUENT HOSPITAL CARE,LEVL II: ICD-10-PCS | Mod: ,,, | Performed by: INTERNAL MEDICINE

## 2022-05-07 PROCEDURE — 36415 COLL VENOUS BLD VENIPUNCTURE: CPT | Performed by: INTERNAL MEDICINE

## 2022-05-07 PROCEDURE — 25000003 PHARM REV CODE 250: Performed by: INTERNAL MEDICINE

## 2022-05-07 PROCEDURE — 97161 PT EVAL LOW COMPLEX 20 MIN: CPT

## 2022-05-07 PROCEDURE — 99232 SBSQ HOSP IP/OBS MODERATE 35: CPT | Mod: ,,, | Performed by: INTERNAL MEDICINE

## 2022-05-07 PROCEDURE — 94761 N-INVAS EAR/PLS OXIMETRY MLT: CPT

## 2022-05-07 PROCEDURE — 99239 HOSP IP/OBS DSCHRG MGMT >30: CPT | Mod: ,,, | Performed by: INTERNAL MEDICINE

## 2022-05-07 PROCEDURE — 84100 ASSAY OF PHOSPHORUS: CPT | Performed by: INTERNAL MEDICINE

## 2022-05-07 PROCEDURE — 83735 ASSAY OF MAGNESIUM: CPT | Performed by: INTERNAL MEDICINE

## 2022-05-07 PROCEDURE — 99239 PR HOSPITAL DISCHARGE DAY,>30 MIN: ICD-10-PCS | Mod: ,,, | Performed by: INTERNAL MEDICINE

## 2022-05-07 PROCEDURE — 97116 GAIT TRAINING THERAPY: CPT

## 2022-05-07 PROCEDURE — 85025 COMPLETE CBC W/AUTO DIFF WBC: CPT | Performed by: INTERNAL MEDICINE

## 2022-05-07 PROCEDURE — 80048 BASIC METABOLIC PNL TOTAL CA: CPT | Performed by: INTERNAL MEDICINE

## 2022-05-07 RX ORDER — FUROSEMIDE 40 MG/1
40 TABLET ORAL DAILY
Status: DISCONTINUED | OUTPATIENT
Start: 2022-05-07 | End: 2022-05-07 | Stop reason: HOSPADM

## 2022-05-07 RX ORDER — METOPROLOL SUCCINATE 50 MG/1
50 TABLET, EXTENDED RELEASE ORAL 2 TIMES DAILY
Qty: 60 TABLET | Refills: 0 | Status: SHIPPED | OUTPATIENT
Start: 2022-05-07 | End: 2022-05-30 | Stop reason: SDUPTHER

## 2022-05-07 RX ADMIN — CLOPIDOGREL 75 MG: 75 TABLET, FILM COATED ORAL at 09:05

## 2022-05-07 RX ADMIN — ASPIRIN 81 MG: 81 TABLET, COATED ORAL at 09:05

## 2022-05-07 RX ADMIN — CLONIDINE HYDROCHLORIDE 0.1 MG: 0.1 TABLET ORAL at 09:05

## 2022-05-07 RX ADMIN — NIFEDIPINE 30 MG: 30 TABLET, FILM COATED, EXTENDED RELEASE ORAL at 09:05

## 2022-05-07 RX ADMIN — PREGABALIN 150 MG: 75 CAPSULE ORAL at 09:05

## 2022-05-07 RX ADMIN — METOPROLOL SUCCINATE 50 MG: 50 TABLET, EXTENDED RELEASE ORAL at 09:05

## 2022-05-07 RX ADMIN — SERTRALINE 100 MG: 100 TABLET, FILM COATED ORAL at 09:05

## 2022-05-07 RX ADMIN — INSULIN ASPART 6 UNITS: 100 INJECTION, SOLUTION INTRAVENOUS; SUBCUTANEOUS at 09:05

## 2022-05-07 RX ADMIN — INSULIN ASPART 5 UNITS: 100 INJECTION, SOLUTION INTRAVENOUS; SUBCUTANEOUS at 09:05

## 2022-05-07 RX ADMIN — PANTOPRAZOLE SODIUM 40 MG: 40 TABLET, DELAYED RELEASE ORAL at 09:05

## 2022-05-07 RX ADMIN — FUROSEMIDE 40 MG: 40 TABLET ORAL at 09:05

## 2022-05-07 NOTE — PT/OT/SLP EVAL
"Physical Therapy Evaluation and Discharge Note    Patient Name:  Indira Waters   MRN:  [id number]    Recommendations:     Discharge Recommendations:  home   Discharge Equipment Recommendations: none   Barriers to discharge: None    Assessment:     Indira Waters is a 77 y.o. female admitted with a medical diagnosis of Type 2 diabetes mellitus with hyperglycemia. .  At this time, patient is functioning at their prior level of function and does not require further acute PT services. Upon arrival, pt was sitting up in chair on room air. Pt was Indigo-CGA sit<>stand w/ SPC. Pt amb 45ft w/ SPC CGA, increased fatigue. Daughter present w/in room. Rollator was suggested for pt to use d/t increase fatigue and more support. Eval/DCPT      Recent Surgery: Procedure(s) (LRB):  ANGIOGRAM, CORONARY ARTERY (N/A) 1 Day Post-Op    Plan:     During this hospitalization, patient does not require further acute PT services.  Please re-consult if situation changes.      Subjective     Chief Complaint: I feel naseous  Patient/Family Comments/goals: Return home w/ Daughter  Pain/Comfort:  · Pain Rating 1: other (see comments) (did not rate but stated "a lit pain this is the knee I fell on")  · Location - Side 1: Left  · Location 1: knee  · Pain Addressed 1: Nurse notified    Patients cultural, spiritual, Confucianist conflicts given the current situation: no    Living Environment:  Pt lives w/ daughter on 2nd in an Apt. Has 20 steps upon entry into home.  Prior to admission, patients level of function was Mod I.  Equipment used at home: rollator, cane, straight, wheelchair.  DME owned (not currently used): none.  Upon discharge, patient will have assistance from Daughter and Grandson.    Objective:     Communicated with Jamee STEWART prior to session.  Patient found up in chair with peripheral IV upon PT entry to room.    General Precautions: Standard, fall   Orthopedic Precautions:N/A   Braces: N/A   Respiratory Status: Room " air    Exams:  · Cognitive Exam:  Patient is oriented to Person, Place, Time and Situation  · Gross Motor Coordination:  WFL  · Postural Exam:  Patient presented with the following abnormalities:    · -       Rounded shoulders  · -       Forward head  · RLE ROM: WFL  · RLE Strength: WFL  · LLE ROM: Deficits: Decreased AROM  · LLE Strength: Deficits: 3/5 Hip flex, 2+/5 knee ext/flexion, and 4/5 DF/PF    Functional Mobility:  · Transfers:     · Sit to Stand:  minimum assistance with straight cane  · Gait: 45ft w/ SPC CGA, increased fatigue  · Balance: Good static and Fair+ dynamic    AM-PAC 6 CLICK MOBILITY  Total Score:20       Therapeutic Activities and Exercises:   importance of mob, safety awareness, gait training for increased functional mob and endurance, Eval/DCPT    AM-PAC 6 CLICK MOBILITY  Total Score:20     Patient left up in chair with all lines intact, call button in reach, RN notified and Daughter present.    GOALS:   Multidisciplinary Problems     Physical Therapy Goals     Not on file                History:     Past Medical History:   Diagnosis Date    Arthritis     Back pain     Cancer     ovarian    Depression     Diabetes mellitus     Fibromyalgia     Hyperlipidemia     Hypertension     Lupus     Stroke     slight left sided weakness       Past Surgical History:   Procedure Laterality Date    APPENDECTOMY       SECTION      HYSTERECTOMY      INJECTION OF ANESTHETIC AGENT AROUND NERVE Bilateral 2021    Procedure: BLOCK, NERVE, SYMPATHIC;  Surgeon: Holden Pereira MD;  Location: [facility];  Service: Pain Management;  Laterality: Bilateral;    INJECTION OF ANESTHETIC AGENT AROUND NERVE N/A 2021    Procedure: BLOCK, NERVE, SYMPATHETIC  need consent;  Surgeon: Holden Pereira MD;  Location: [facility];  Service: Pain Management;  Laterality: N/A;    SD EVAL,SWALLOW FUNCTION,CINE/VIDEO RECORD  2021         TONSILLECTOMY         Time Tracking:     PT  Received On: 05/07/22  PT Start Time: 1125     PT Stop Time: 1200  PT Total Time (min): 35 min     Billable Minutes: Evaluation 17 and Gait Training 18      05/07/2022

## 2022-05-07 NOTE — PT/OT/SLP PROGRESS
Occupational Therapy      Patient Name:  Indira Waters   MRN:  [id number]    [id number]:  Patient not seen today secondary to discharging soon with daughter packing and expressing disinterest in OT at this time.  Discussed DME at home with Pt. Having rollator and BSC as well as access to TTB in storage.  Pt. Found seated at rollator and daughter drying her hair; daughter pushing Pt. Out of bathroom using rollator as W/C and advised that rollator should not be used in this manner as Pt. Could possibly flip out of it; daughter dismissing this and continuing to push Pt. Out of bathroom seated at rollator.  Also suggested use of TTB as daughter reports assisting Pt. With sit down baths and assisting her with transfer into tub,.  Will follow-up later if d/c plans change.    5/7/2022

## 2022-05-07 NOTE — PROGRESS NOTES
[facility]    Admission date:  5/4/2022     Assessment    Acute heart failure  Echocardiogram confirms a left ventricular function remains normal.  So exacerbation likely related to volume resuscitation efforts.  She is compensated now.     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       Essential hypertension  Controlled    Plan and Discussion    She has diffuse diabetic disease.  Discussed with patient and her daughter that I think at this point the best course is intensive medical therapy, especially given her difficult to control diabetes.  Her angina improved with addition of clopidogrel and metoprolol just prior to admission.  Metoprolol was increased.  Blood pressure and heart rate are tolerating.  From the cardiac standpoint, she is ready for discharge.  I will plan to see her in the office 2 weeks after discharge.    Subjective    No chest pain or dyspnea.  Breathing and able to speak in complete sentences comfortably while supine on no supplemental oxygen.    Medications  Current Facility-Administered Medications   Medication Dose Route Frequency Provider Last Rate Last Admin    acetaminophen tablet 650 mg  650 mg Oral Q4H PRN Keila Chan MD   650 mg at 05/05/22 1627    acetaminophen tablet 650 mg  650 mg Oral Q4H PRN Jose L Méndez MD        albuterol-ipratropium 2.5 mg-0.5 mg/3 mL nebulizer solution 3 mL  3 mL Nebulization Q4H PRN Jairon Ariza NP   3 mL at 05/05/22 0422    aluminum-magnesium hydroxide-simethicone 200-200-20 mg/5 mL suspension 30 mL  30 mL Oral QID PRN Keila Chan MD        aspirin EC tablet 81 mg  81 mg Oral Daily Keila Chan MD   81 mg at 05/06/22 0958    cloNIDine tablet 0.1 mg  0.1 mg Oral BID Jose L Méndez MD   0.1 mg at 05/06/22 2114    clopidogreL tablet 75 mg  75 mg Oral Daily Keila Chan MD   75 mg at 05/06/22 0958    dextrose 10% bolus 125 mL  12.5 g Intravenous PRN  Keila Chan MD        dextrose 10% bolus 250 mL  25 g Intravenous PRN Keila Chan MD        furosemide tablet 40 mg  40 mg Oral Daily Keila Chan MD        glucagon (human recombinant) injection 1 mg  1 mg Intramuscular PRN Keila Chan MD        glucose chewable tablet 16 g  16 g Oral PRN Keila Chan MD        glucose chewable tablet 24 g  24 g Oral PRN Keila Chan MD        HYDROcodone-acetaminophen  mg per tablet 1 tablet  1 tablet Oral Q8H PRN Keila Chan MD   1 tablet at 05/06/22 2115    insulin aspart U-100 pen 1-10 Units  1-10 Units Subcutaneous QID (AC + HS) PRN Keila Chan MD   8 Units at 05/06/22 1545    insulin aspart U-100 pen 5 Units  5 Units Subcutaneous TIDWM Keila Chan MD   5 Units at 05/06/22 1844    insulin detemir U-100 pen 10 Units  10 Units Subcutaneous BID Keila Chan MD   10 Units at 05/06/22 2114    melatonin tablet 6 mg  6 mg Oral Nightly PRN Keila Chan MD        methocarbamoL tablet 500 mg  500 mg Oral TID PRN Keila Chan MD   500 mg at 05/06/22 2116    metoprolol succinate (TOPROL-XL) 24 hr tablet 50 mg  50 mg Oral BID Jose L Méndez MD   50 mg at 05/06/22 2116    naloxone 0.4 mg/mL injection 0.02 mg  0.02 mg Intravenous PRN Keila Chan MD        NIFEdipine 24 hr tablet 30 mg  30 mg Oral BID Jose L Méndez MD   30 mg at 05/06/22 2116    nitroGLYCERIN SL tablet 0.4 mg  0.4 mg Sublingual Q5 Min PRN Keila Chan MD        ondansetron disintegrating tablet 8 mg  8 mg Oral Q8H PRN Jose L Méndez MD        ondansetron injection 4 mg  4 mg Intravenous Q6H PRN Keila Chan MD        pantoprazole EC tablet 40 mg  40 mg Oral Daily Keila Chan MD   40 mg at 05/06/22 0958    polyethylene glycol packet 17 g  17 g Oral Daily PRN Keila Chan MD        pregabalin capsule 150 mg  150 mg Oral BID Keila Chan MD   150 mg at 05/06/22 2116    sertraline tablet 100 mg  100 mg Oral Daily Keila Chan MD   100  mg at 05/06/22 0958    sodium chloride 0.9% flush 10 mL  10 mL Intravenous Q8H NETO Chan MD            Physical Exam    Temp:  [98 °F (36.7 °C)-99.7 °F (37.6 °C)]   Pulse:  [57-68]   Resp:  [18]   BP: (126-148)/(57-67)   SpO2:  [90 %-100 %]    Wt Readings from Last 3 Encounters:   05/06/22 83.9 kg (185 lb)   04/27/22 81.4 kg (179 lb 8 oz)   04/20/22 84.2 kg (185 lb 10 oz)     Physical Exam  Constitutional:       General: She is not in acute distress.  Neck:      Vascular: No hepatojugular reflux or JVD.   Cardiovascular:      Rate and Rhythm: Normal rate and regular rhythm.      Heart sounds: S1 normal and S2 normal. No murmur heard.    No S3 or S4 sounds.      Comments: Some ecchymosis around the right radial access.  No bleeding or swelling.  Hand is well perfused.  Right femoral access site looks good with no bleeding, hematoma, or bruit.  Distal pulses are unchanged.  Pulmonary:      Effort: Pulmonary effort is normal.      Breath sounds: Normal breath sounds and air entry.   Abdominal:      General: Bowel sounds are normal.      Palpations: Abdomen is soft. There is no hepatomegaly.      Tenderness: There is no abdominal tenderness.   Musculoskeletal:      Right lower leg: No edema.      Left lower leg: No edema.   Skin:     Coloration: Skin is not pale.   Neurological:      Mental Status: She is alert.   Psychiatric:         Behavior: Behavior is cooperative.         Telemetry  Sinus rhythm    Labs  Recent Results (from the past 24 hour(s))   Echo    Collection Time: 05/06/22  8:55 AM   Result Value Ref Range    BSA 1.88 m2    TDI SEPTAL 0.05 m/s    LV LATERAL E/E' RATIO 12.00 m/s    LV SEPTAL E/E' RATIO 21.60 m/s    LA WIDTH 3.45 cm    TDI LATERAL 0.09 m/s    LVIDd 3.40 (A) 3.5 - 6.0 cm    IVS 1.18 (A) 0.6 - 1.1 cm    Posterior Wall 1.23 (A) 0.6 - 1.1 cm    LVIDs 2.42 2.1 - 4.0 cm    FS 29 28 - 44 %    LA volume 62.41 cm3    Sinus 2.94 cm    STJ 2.16 cm    Ascending aorta 2.41 cm    LV mass 131.07  g    LA size 3.92 cm    RVDD 3.37 cm    TAPSE 1.56 cm    Left Ventricle Relative Wall Thickness 0.72 cm    AV mean gradient 4 mmHg    AV valve area 1.54 cm2    AV Velocity Ratio 0.90     AV index (prosthetic) 0.97     MV valve area p 1/2 method 2.65 cm2    E/A ratio 1.01     Mean e' 0.07 m/s    E wave deceleration time 286.31 msec    IVRT 83.04 msec    LVOT diameter 1.42 cm    LVOT area 1.6 cm2    LVOT peak calvin 1.08 m/s    LVOT peak VTI 29.03 cm    Ao peak calvin 1.20 m/s    Ao VTI 29.78 cm    Mr max calvin 0.02 m/s    LVOT stroke volume 45.95 cm3    AV peak gradient 6 mmHg    E/E' ratio 15.43 m/s    MV Peak E Calvin 1.08 m/s    TR Max Calvin 2.56 m/s    MV stenosis pressure 1/2 time 83.03 ms    MV Peak A Calvin 1.07 m/s    LV Systolic Volume 20.64 mL    LV Systolic Volume Index 11.4 mL/m2    LV Diastolic Volume 47.46 mL    LV Diastolic Volume Index 26.22 mL/m2    LA Volume Index 34.5 mL/m2    LV Mass Index 72 g/m2    RA Major Axis 4.46 cm    Left Atrium Minor Axis 5.15 cm    Left Atrium Major Axis 5.74 cm    Triscuspid Valve Regurgitation Peak Gradient 26 mmHg    LA Volume Index (Mod) 30.4 mL/m2    LA volume (mod) 55.00 cm3    RA Width 2.55 cm    Right Atrial Pressure (from IVC) 3 mmHg    EF 55 %    TV rest pulmonary artery pressure 29 mmHg   POCT glucose    Collection Time: 05/06/22 12:41 PM   Result Value Ref Range    POCT Glucose 359 (H) 70 - 110 mg/dL   POCT glucose    Collection Time: 05/06/22  3:35 PM   Result Value Ref Range    POCT Glucose 340 (H) 70 - 110 mg/dL   POCT glucose    Collection Time: 05/06/22  5:13 PM   Result Value Ref Range    POCT Glucose 260 (H) 70 - 110 mg/dL   POCT glucose    Collection Time: 05/06/22  8:28 PM   Result Value Ref Range    POCT Glucose 156 (H) 70 - 110 mg/dL   Basic Metabolic Panel (BMP)    Collection Time: 05/07/22  6:10 AM   Result Value Ref Range    Sodium 142 136 - 145 mmol/L    Potassium 4.1 3.5 - 5.1 mmol/L    Chloride 109 95 - 110 mmol/L    CO2 22 (L) 23 - 29 mmol/L    Glucose 289  (H) 70 - 110 mg/dL    BUN 19 8 - 23 mg/dL    Creatinine 1.2 0.5 - 1.4 mg/dL    Calcium 8.0 (L) 8.7 - 10.5 mg/dL    Anion Gap 11 8 - 16 mmol/L    eGFR if African American 51 (A) >60 mL/min/1.73 m^2    eGFR if non African American 44 (A) >60 mL/min/1.73 m^2   Magnesium    Collection Time: 05/07/22  6:10 AM   Result Value Ref Range    Magnesium 2.2 1.6 - 2.6 mg/dL   Phosphorus    Collection Time: 05/07/22  6:10 AM   Result Value Ref Range    Phosphorus 2.7 2.7 - 4.5 mg/dL   CBC with Automated Differential    Collection Time: 05/07/22  6:10 AM   Result Value Ref Range    WBC 6.09 3.90 - 12.70 K/uL    RBC 3.43 (L) 4.00 - 5.40 M/uL    Hemoglobin 10.0 (L) 12.0 - 16.0 g/dL    Hematocrit 31.1 (L) 37.0 - 48.5 %    MCV 91 82 - 98 fL    MCH 29.2 27.0 - 31.0 pg    MCHC 32.2 32.0 - 36.0 g/dL    RDW 14.3 11.5 - 14.5 %    Platelets 163 150 - 450 K/uL    MPV 11.9 9.2 - 12.9 fL    Immature Granulocytes 0.3 0.0 - 0.5 %    Gran # (ANC) 2.5 1.8 - 7.7 K/uL    Immature Grans (Abs) 0.02 0.00 - 0.04 K/uL    Lymph # 3.0 1.0 - 4.8 K/uL    Mono # 0.6 0.3 - 1.0 K/uL    Eos # 0.1 0.0 - 0.5 K/uL    Baso # 0.02 0.00 - 0.20 K/uL    nRBC 0 0 /100 WBC    Gran % 40.5 38.0 - 73.0 %    Lymph % 48.4 (H) 18.0 - 48.0 %    Mono % 9.7 4.0 - 15.0 %    Eosinophil % 0.8 0.0 - 8.0 %    Basophil % 0.3 0.0 - 1.9 %    Differential Method Automated    POCT glucose    Collection Time: 05/07/22  8:32 AM   Result Value Ref Range    POCT Glucose 289 (H) 70 - 110 mg/dL             Jose L Méndez MD

## 2022-05-07 NOTE — ASSESSMENT & PLAN NOTE
- Baseline Cr 1.0 - 1.3  - Presents with Cr. 1.7  - Suspect due to volume depletion from hyperglycemia  - Cr down to baseline. Monitor

## 2022-05-07 NOTE — DISCHARGE SUMMARY
[facility]  Discharge Summary      Patient Name: Indira Waters  MRN: [id number]  Patient Class: IP- Inpatient  Admission Date: 5/4/2022  Hospital Length of Stay: 2 days  Discharge Date and Time:  05/07/2022 1:03 PM  Attending Physician: Keila Chan MD   Discharging Provider: Keila Chan MD  Primary Care Provider: Chun Zapata MD      HPI:   76 y/o F with history of DM2, HTN, HLD, CAD, fibromyalgia, and chronic pain who presented for outpatient coronary angiography for evaluation of angina. She was found to have elevated creatinine and hyperglycemia so referred for admission and treatment. She received benadryl and diazepam prior to planned procedure. She is drowsy at the time of my evaluation so history obtained from her daughter at bedside. She reports compliance with home medications, including insulin but she has had uncontrolled blood glucose for the past several weeks (often >300). Daughter reports she has symptoms of hypoglycemia when her glucose is low/normal so she often runs high. Patient has complained of dry mouth and polyuria lately. Currently chest pain free. Patient complains of dysuria but reports this is chronic. Denies fever or chills. ROS limited       Procedure(s) (LRB):  ANGIOGRAM, CORONARY ARTERY (N/A)      Hospital Course:   Patient presented for cardiac cath for evaluation of stable angina but was found to have elevated Cr and hyperglycemia (glucose >600) on pre-op labs. She was admitted to hospital medicine and started on IVF for RANJEET and insulin for hyperglycemia. Overnight she developed acute onset dyspnea and hypoxia requiring supplemental oxygen. She developed pulmonary edema related to IVF so fluids held and she was started on IV lasix with improvement in symptoms. LHC done on 5/6 which showed diffuse diabetic small vessel disease. She was treated with continued medical management. She had improvement in respiratory symptoms with  diuresis and weaned off oxygen. Home lasix regimen was resumed. She was evaluated by PT/OT and recommended discharge home. She is stable and ready for discharge home today. Discussed importance of improved glucose control with patient and her 3 daughters       Goals of Care Treatment Preferences:  Code Status: Full Code    Health care agent: Esha Iglesias  [facility] care agent number: [phone]                   Consults:     * Type 2 diabetes mellitus with hyperglycemia  - Uncontrolled. A1c 11.6.   - Glucose 648 without AGMA. Now improved  - Continue levemir 10U BID. Aspart 5U TID  - Continue moderate dose SSI  - Consistent carb diet    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 pectoris  - Planned for [facility] today but cancelled due to hyperglycemia and RANJEET  - Nitroglycerin PRN  - Cardiology following.   - [facility] 5/6 with small diabetic vessels with diffuse disease  - Continue medical management with aspirin, plavix, and metoprolol    Essential hypertension  - Continue clonidine, metoprolol, and nifedipine as BP allows    Delirium-resolved as of 5/7/2022  - With hallucinations and confusion that waxes and wanes  - Suspect hospital/mediation induced  - Delirium precautions ordered    Acute hypoxemic respiratory failure-resolved as of 5/7/2022  Acute on chronic diastolic CHF  - Hypoxic overnight with RA sats down to 86%. Improved with oxygen protocol. Continue  - CXR with mild interstitial edema. BNP 1120. Rales on exam  - Due to IVF which have been stopped  - Improvement with lasix IV x 2. Oxygen weaned from 4L to 2L. Continue to wean as able  - Echo with grade II diastolic dysfunction and normal EF.   - Resume home lasix 40 mg qd  - PT/OT eval    RANJEET (acute kidney injury)-resolved as of 5/7/2022  - Baseline Cr 1.0 - 1.3  - Presents with Cr. 1.7  - Suspect due to volume depletion from hyperglycemia  - Cr down to baseline. Monitor      Final Active Diagnoses:    Diagnosis Date Noted POA     PRINCIPAL PROBLEM:  Type 2 diabetes mellitus with hyperglycemia [E11.65] 04/29/2014 Yes     Chronic    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 pectoris [I25.118] 05/04/2022 Yes    Essential hypertension [I10] 09/10/2021 Yes     Chronic    Fibromyalgia [M79.7] 04/29/2014 Yes     Chronic      Problems Resolved During this Admission:    Diagnosis Date Noted Date Resolved POA    Acute hypoxemic respiratory failure [J96.01] 05/05/2022 05/07/2022 No    Delirium [R41.0] 05/05/2022 05/07/2022 No    RANJEET (acute kidney injury) [N17.9] 05/04/2022 05/07/2022 Yes    Acute on chronic diastolic heart failure [I50.33] 10/01/2021 05/07/2022 Yes       Discharged Condition: good    Disposition: Home or Self Care    Follow Up:   Follow-up Information     Chun Zapata MD Follow up in 2 week(s).    Specialty: Family Medicine  Why: hospital follow-up for uncontrolled diabetes  Contact information:  [address] [address]  [phone]                       Patient Instructions:      Diet diabetic     Diet Cardiac     Activity as tolerated       Significant Diagnostic Studies: Labs: All labs within the past 24 hours have been reviewed    Pending Diagnostic Studies:     None         Medications:  Reconciled Home Medications:      Medication List      CHANGE how you take these medications    metoprolol succinate 50 MG 24 hr tablet  Commonly known as: TOPROL-XL  Take 1 tablet (50 mg total) by mouth 2 (two) times daily.  What changed: when to take this        CONTINUE taking these medications    acetaminophen 500 MG tablet  Commonly known as: TYLENOL  Take 2 tablets (1,000 mg total) by mouth every 8 (eight) hours as needed for Pain.     aspirin 81 MG EC tablet  Commonly known as: ECOTRIN  Take 1 tablet (81 mg total) by mouth once daily.     blood sugar diagnostic Strp  1 each by Misc.(Non-Drug; Combo Route) route 4 (four) times daily.     cloNIDine 0.1 MG  tablet  Commonly known as: CATAPRES  Take 1 tablet (0.1 mg total) by mouth 2 (two) times daily.     clopidogreL 75 mg tablet  Commonly known as: PLAVIX  Take 1 tablet (75 mg total) by mouth once daily.     CombiVENT RESPIMAT  mcg/actuation inhaler  Generic drug: ipratropium-albuteroL  Inhale 1 puff into the lungs by mouth every 6 (six) hours.     DEXCOM G6  Misc  Generic drug: blood-glucose meter,continuous  1 each by Misc.(Non-Drug; Combo Route) route continuous prn.     DEXCOM G6 SENSOR Nicole  Generic drug: blood-glucose sensor  3 each by Misc.(Non-Drug; Combo Route) route continuous prn. Change every 10 days.     DEXCOM G6 TRANSMITTER Nicole  Generic drug: blood-glucose transmitter  1 each by Misc.(Non-Drug; Combo Route) route continuous prn.     furosemide 40 MG tablet  Commonly known as: LASIX  Take 1 tablet (40 mg total) by mouth once daily.     HYDROcodone-acetaminophen  mg per tablet  Commonly known as: NORCO  Take 1 tablet by mouth every 8 (eight) hours as needed for Pain.     insulin aspart U-100 100 unit/mL (3 mL) Inpn pen  Commonly known as: NovoLOG  Inject 12 Units into the skin 3 (three) times daily with meals. Plus correction scale. Max TDD 40units.     * lancets 33 gauge Misc  Use 4 (four) times daily.     * TRUEPLUS LANCETS 30 gauge Misc  Generic drug: lancets  USE FOR TESTING FOUR TIMES DAILY     LEVEMIR FLEXTOUCH U-100 INSULN 100 unit/mL (3 mL) Inpn pen  Generic drug: insulin detemir U-100  Inject 20 Units into the skin 2 (two) times daily.     losartan 50 MG tablet  Commonly known as: COZAAR  Take 1 tablet (50 mg total) by mouth once daily.     methocarbamoL 500 MG Tab  Commonly known as: ROBAXIN  Take 1 tablet (500 mg total) by mouth 3 (three) times daily as needed (muscle spasm).  Start taking on: May 12, 2022     metoclopramide HCl 5 MG tablet  Commonly known as: REGLAN  Take 1 tablet (5 mg total) by mouth 4 (four) times daily as needed (nausea, prevention).     NIFEdipine 30  "MG (OSM) 24 hr tablet  Commonly known as: PROCARDIA-XL  Take 1 tablet (30 mg total) by mouth 2 (two) times a day.     nitroGLYCERIN 0.4 MG SL tablet  Commonly known as: NITROSTAT  Place 1 tablet (0.4 mg total) under the tongue every 5 (five) minutes as needed for Chest pain.     pantoprazole 40 MG tablet  Commonly known as: PROTONIX  Take 1 tablet (40 mg total) by mouth once daily.     pen needle, diabetic 32 gauge x 5/32" Ndle  Commonly known as: BD ULTRA-FINE HIEN PEN NEEDLE  1 each by Misc.(Non-Drug; Combo Route) route 4 (four) times daily.     pregabalin 150 MG capsule  Commonly known as: LYRICA  Take 1 capsule (150 mg total) by mouth 3 (three) times daily.  Start taking on: May 12, 2022     promethazine 25 MG tablet  Commonly known as: PHENERGAN  Take 1 tablet (25 mg total) by mouth every 6 (six) hours as needed for Nausea.     sertraline 100 MG tablet  Commonly known as: ZOLOFT  Take 1 tablet (100 mg total) by mouth once daily.     STOOL SOFTENER-STIMULANT LAXAT 8.6-50 mg per tablet  Generic drug: senna-docusate 8.6-50 mg  Take 1 tablet by mouth 2 (two) times daily as needed for Constipation.     triamcinolone acetonide 0.1% 0.1 % cream  Commonly known as: KENALOG  Apply to affected areas of body twice daily as needed rash. Do not use on face, underarms, or groin.     TRUE METRIX GLUCOSE METER Misc  Generic drug: blood-glucose meter  Follow package directions         * This list has 2 medication(s) that are the same as other medications prescribed for you. Read the directions carefully, and ask your doctor or other care provider to review them with you.                Indwelling Lines/Drains at time of discharge:   Lines/Drains/Airways     None                 Time spent on the discharge of patient: 35 minutes         Keila Chan MD  Department of Hospital Medicine  [facility])  "

## 2022-05-07 NOTE — ASSESSMENT & PLAN NOTE
Acute on chronic diastolic CHF  - Hypoxic overnight with RA sats down to 86%. Improved with oxygen protocol. Continue  - CXR with mild interstitial edema. BNP 1120. Rales on exam  - Due to IVF which have been stopped  - Improvement with lasix IV x 2. Oxygen weaned from 4L to 2L. Continue to wean as able  - Echo with grade II diastolic dysfunction and normal EF.   - Resume home lasix 40 mg qd  - PT/OT eval

## 2022-05-07 NOTE — ASSESSMENT & PLAN NOTE
- Planned for LHC today but cancelled due to hyperglycemia and RANJEET  - Nitroglycerin PRN  - Cardiology following.   - LHC 5/6 with small diabetic vessels with diffuse disease  - Continue medical management with aspirin, plavix, and metoprolol

## 2022-05-07 NOTE — PLAN OF CARE
Spoke with patient's son in law - he's at bedside to provide transportation home - denied any discharge needs        05/07/22 1312   Final Note   Assessment Type Final Discharge Note   Anticipated Discharge Disposition Home   What phone number can be called within the next 1-3 days to see how you are doing after discharge? [phone]   Hospital Resources/Appts/Education Provided Provided patient/caregiver with written discharge plan information

## 2022-05-07 NOTE — ASSESSMENT & PLAN NOTE
- Uncontrolled. A1c 11.6.   - Glucose 648 without AGMA. Now improved  - Continue levemir 10U BID. Aspart 5U TID  - Continue moderate dose SSI  - Consistent carb diet

## 2022-05-09 ENCOUNTER — PATIENT OUTREACH (OUTPATIENT)
Dept: ADMINISTRATIVE | Facility: OTHER | Age: 77
End: 2022-05-09
Payer: MEDICARE

## 2022-05-09 DIAGNOSIS — J41.1 MUCOPURULENT CHRONIC BRONCHITIS: Primary | ICD-10-CM

## 2022-05-09 NOTE — PROGRESS NOTES
IP Liaison - Final Visit Note    Patient: Indira Waters  MRN:  [id number]  Date of Service:  5/9/2022  Completed by:  EDGAR Obrien    Reason for Visit   Patient presents with    IP Liaison Chart Review       The following were addressed during this visit:  - Complete follow-up visit with patient        Patient Summary     Discharge Date: 5/7/2022  Discharge telephone number/address:  [phone]/[address] [facility] [address] [phone]  Follow up provider: Jayesh Boggs MD  Follow up appointments: 5/17/2022, 2:30pm  Home Health agency & telephone number: n/a  DME ordered &  name: n/a  Assigned OPCM RN/SW: Mary Lou POMPA  Report sent to follow up team (PCP/OPCM) via in basket message: yes  Community Resources arranged including agency name & contact info: none    EDGAR Obrien

## 2022-05-10 ENCOUNTER — PATIENT OUTREACH (OUTPATIENT)
Dept: ADMINISTRATIVE | Facility: CLINIC | Age: 77
End: 2022-05-10
Payer: MEDICARE

## 2022-05-12 DIAGNOSIS — G89.4 CHRONIC PAIN DISORDER: ICD-10-CM

## 2022-05-12 DIAGNOSIS — E08.42 DIABETIC POLYNEUROPATHY ASSOCIATED WITH DIABETES MELLITUS DUE TO UNDERLYING CONDITION: Primary | ICD-10-CM

## 2022-05-12 DIAGNOSIS — R11.0 NAUSEA: Primary | ICD-10-CM

## 2022-05-12 DIAGNOSIS — I20.89 ANGINA AT REST: ICD-10-CM

## 2022-05-12 DIAGNOSIS — G89.4 CHRONIC PAIN SYNDROME: ICD-10-CM

## 2022-05-12 RX ORDER — HYDROCODONE BITARTRATE AND ACETAMINOPHEN 10; 325 MG/1; MG/1
1 TABLET ORAL EVERY 8 HOURS PRN
Qty: 90 TABLET | Refills: 0 | Status: SHIPPED | OUTPATIENT
Start: 2022-05-13 | End: 2022-06-13 | Stop reason: SDUPTHER

## 2022-05-12 RX ORDER — PROMETHAZINE HYDROCHLORIDE 25 MG/1
25 TABLET ORAL EVERY 6 HOURS PRN
Qty: 30 TABLET | Refills: 0 | Status: SHIPPED | OUTPATIENT
Start: 2022-05-12 | End: 2022-05-29 | Stop reason: SDUPTHER

## 2022-05-12 RX ORDER — PREGABALIN 150 MG/1
150 CAPSULE ORAL 3 TIMES DAILY
Qty: 90 CAPSULE | Refills: 2 | Status: SHIPPED | OUTPATIENT
Start: 2022-05-12 | End: 2022-06-13 | Stop reason: SDUPTHER

## 2022-05-12 RX ORDER — METHOCARBAMOL 500 MG/1
500 TABLET, FILM COATED ORAL 3 TIMES DAILY PRN
Qty: 90 TABLET | Refills: 0 | Status: SHIPPED | OUTPATIENT
Start: 2022-05-12 | End: 2022-06-13 | Stop reason: SDUPTHER

## 2022-05-12 RX ORDER — NITROGLYCERIN 0.4 MG/1
0.4 TABLET SUBLINGUAL EVERY 5 MIN PRN
Qty: 25 TABLET | Refills: 0 | Status: SHIPPED | OUTPATIENT
Start: 2022-05-12 | End: 2022-05-29 | Stop reason: SDUPTHER

## 2022-05-12 NOTE — TELEPHONE ENCOUNTER
Patient requesting refill on Norco 10/325mg  Last office visit 04.14.22   shows last refill on 04.14.22  Patient does have a pain contract on file with [facility]  Patient last UDS 02.14.22 was consistent with current therapy    CODEINE  Not Detected     MORPHINE  Not Detected     6-ACETYLMORPHINE  Not Detected     OXYCODONE  Not Detected     NOROYXCODONE  Not Detected     OXYMORPHONE  Not Detected     NOROXYMORPHONE  Not Detected     HYDROCODONE  Present     NORHYDROCODONE  Present     HYDROMORPHONE  Present     BUPRENORPHINE  Not Detected     NORUBPRENORPHINE  Not Detected     FENTANYL  Not Detected     NORFENTANYL  Not Detected     MEPERIDINE METABOLITE  Not Detected     TAPENTADOL  Not Detected     TAPENTADOL-O-SULF  Not Detected     METHADONE  Not Detected     TRAMADOL  Not Detected     AMPHETAMINE  Not Detected     METHAMPHETAMINE  Not Detected     MDMA- ECSTASY  Not Detected     MDA  Not Detected     MDEA- Mary  Not Detected     METHYLPHENIDATE  Not Detected     PHENTERMINE  Not Detected     BENZOYLECGONINE  Not Detected     ALPRAZOLAM  Not Detected     ALPHA-OH-ALPRAZOLAM  Not Detected     CLONAZEPAM  Not Detected     7-AMINOCLONAZEPAM  Not Detected     DIAZEPAM  Not Detected     NORDIAZEPAM  Not Detected     OXAZEPAM  Not Detected     TEMAZEPAM  Not Detected     Lorazepam  Not Detected     MIDAZOLAM  Not Detected     ZOLPIDEM  Not Detected     BARBITURATES  Not Detected     Creatinine, Urine 20.0 - 400.0 mg/dL 62.7  36.9 R, CM    ETHYL GLUCURONIDE  Not Detected     MARIJUANA METABOLITE  Not Detected     PCP  Not Detected     CARISOPRODOL  Not Detected     Comment: The carisoprodol immunoassay has cross-reactivity to   carisoprodol and meprobamate.    Naloxone  Not Detected     Gabapentin  Not Detected     Pregabalin  Present     Alpha-OH-Midazolam  Not Detected     Zolpidem Metabolite  Not Detected

## 2022-05-12 NOTE — TELEPHONE ENCOUNTER
Refill Routing Note   Medication(s) are not appropriate for processing by [facility] for the following reason(s):      - Patient has been seen in the ED/Hospital since the last PCP visit    [facility] action(s):  Defer          Medication reconciliation completed: No     Appointments  past 12m or future 3m with PCP    Date Provider   Last Visit   4/20/2022 Chun Zapata MD   Next Visit   5/12/2022 Chun Zapata MD   ED visits in past 90 days: 0        Note composed:12:12 PM 05/12/2022

## 2022-05-12 NOTE — TELEPHONE ENCOUNTER
----- Message from David Ortiz sent at 5/12/2022 11:36 AM CDT -----  Can the clinic reply in [facility]: Yes         Please refill the medication(s) listed below. Please call the patient when the prescription(s) is ready for  at this phone number   [phone]         Medication #1 HYDROcodone-acetaminophen (NORCO)  mg per tablet         Medication #2 methocarbamoL (ROBAXIN) 500 MG Tab      Medication #pregabalin (LYRICA) 150 MG capsule         Preferred Pharmacy: [facility]

## 2022-05-12 NOTE — TELEPHONE ENCOUNTER
Refill Routing Note   Medication(s) are not appropriate for processing by [facility] for the following reason(s):      - Outside of protocol    [facility] action(s):  Route          Medication reconciliation completed: No     Appointments  past 12m or future 3m with PCP    Date Provider   Last Visit   4/20/2022 Chun Zapata MD   Next Visit   6/20/2022 Chun Zapata MD   ED visits in past 90 days: 0        Note composed:11:33 AM 05/12/2022

## 2022-05-13 NOTE — TELEPHONE ENCOUNTER
Spoke with pt daughter about promethazine (PHENERGAN) 25 MG tablet being sent to preferred pharmacy on file. Pt daughter verbalized understanding. She stated she was ok with weaning off Reglan due to Phenergan. Had no further questions.

## 2022-05-16 ENCOUNTER — TELEPHONE (OUTPATIENT)
Dept: NEPHROLOGY | Facility: CLINIC | Age: 77
End: 2022-05-16
Payer: MEDICARE

## 2022-05-17 ENCOUNTER — OFFICE VISIT (OUTPATIENT)
Dept: RHEUMATOLOGY | Facility: CLINIC | Age: 77
End: 2022-05-17
Payer: MEDICARE

## 2022-05-17 DIAGNOSIS — D69.0 IGA MEDIATED LEUKOCYTOCLASTIC VASCULITIS: ICD-10-CM

## 2022-05-17 DIAGNOSIS — Z71.89 COUNSELING AND COORDINATION OF CARE: ICD-10-CM

## 2022-05-17 DIAGNOSIS — M15.9 PRIMARY OSTEOARTHRITIS INVOLVING MULTIPLE JOINTS: ICD-10-CM

## 2022-05-17 DIAGNOSIS — G89.4 CHRONIC PAIN SYNDROME: ICD-10-CM

## 2022-05-17 DIAGNOSIS — M79.7 FIBROMYALGIA: Primary | ICD-10-CM

## 2022-05-17 DIAGNOSIS — E66.9 CLASS 2 OBESITY WITH BODY MASS INDEX (BMI) OF 36.0 TO 36.9 IN ADULT, UNSPECIFIED OBESITY TYPE, UNSPECIFIED WHETHER SERIOUS COMORBIDITY PRESENT: ICD-10-CM

## 2022-05-17 PROCEDURE — 99214 OFFICE O/P EST MOD 30 MIN: CPT | Mod: 95,,, | Performed by: INTERNAL MEDICINE

## 2022-05-17 PROCEDURE — 99214 PR OFFICE/OUTPT VISIT, EST, LEVL IV, 30-39 MIN: ICD-10-PCS | Mod: 95,,, | Performed by: INTERNAL MEDICINE

## 2022-05-18 NOTE — PROGRESS NOTES
The patient location is: Home  The chief complaint leading to consultation is: Follow up   Visit type: Virtual visit with synchronous audio and video  Total time spent with patient: 15 minutes     Each patient to whom he or she provides medical services by telemedicine is:  (1) informed of the relationship between the physician and patient and the respective role of any other health care provider with respect to management of the patient; and (2) notified that he or she may decline to receive medical services by telemedicine and may withdraw from such care at any time.         RHEUMATOLOGY OUTPATIENT CLINIC NOTE    5/18/2022    Attending Rheumatologist: Jayesh Boggs  Primary Care Provider: Chun Zapata MD   Physician Requesting Consultation: No referring provider defined for this encounter.  Chief Complaint/Reason For Consultation:  No chief complaint on file.      Subjective:       HPI  Indira Waters is a 77 y.o. White female with medical history noted below who presents for evaluation of joint pain.   Patient presents for evaluation of joint pain. She reports widespread pain from the top of her head to the bottom of your toes. Patient reports morning stiffness, usually takes her pain pill and it goes away. Reports lower extremity swelling. 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 +fatigue, HA, forgetfulness, anxiety/depresssion, paraesthesias, myalgias/spasms. +Hair thinning, oral sores, rash on arms though as a result of LCV, Hx of Pleural Effusion, easy bruising, Raynaud's, Hx of CVA, miscarriage x2 (1st trimester), chest pain, wt gain. No Photosensitivity, LAD, Raynaud's, GERD, Skin tightening, SOB, fever, wt loss. Of note patient reports Hx of Fibromyalgia and Lupus (not previously treated).     Today  Patient here for follow up.   Last visit had got the prior work up ordered. Notes she has been suffering from pain all over,  relief from current therapy plan. Rest per ROS.     Review of Systems   Constitutional: Positive for fatigue. Negative for chills, fever and unexpected weight change.   HENT: Positive for mouth dryness, mouth sores and trouble swallowing.    Eyes: Positive for eye dryness. Negative for redness.   Respiratory: Negative for cough and shortness of breath.    Cardiovascular: Positive for chest pain.   Gastrointestinal: Negative for abdominal distention, constipation, diarrhea, nausea and vomiting.   Genitourinary: Positive for dysuria. Negative for genital sores and vaginal dryness.   Musculoskeletal: Positive for arthralgias, back pain, leg pain and myalgias. Negative for gait problem, joint swelling, neck pain, neck stiffness and joint deformity.   Integumentary:  Negative for rash.   Neurological: Positive for numbness and headaches. Negative for weakness.   Hematological: Negative for adenopathy. Bruises/bleeds easily.   Psychiatric/Behavioral: Positive for decreased concentration and sleep disturbance. Negative for confusion. The patient is nervous/anxious.    All other systems reviewed and are negative.       Chronic comorbid conditions affecting medical decision making today:  Past Medical History:   Diagnosis Date    Arthritis     Back pain     Cancer     ovarian    Depression     Diabetes mellitus     Fibromyalgia     Hyperlipidemia     Hypertension     Lupus     Stroke     slight left sided weakness     Past Surgical History:   Procedure Laterality Date    APPENDECTOMY       SECTION      CORONARY ANGIOGRAPHY N/A 2022    Procedure: ANGIOGRAM, CORONARY ARTERY;  Surgeon: Jose L Méndez MD;  Location: [facility] operated [facility] CATH LAB;  Service: Cardiology;  Laterality: N/A;    HYSTERECTOMY      INJECTION OF ANESTHETIC AGENT AROUND NERVE Bilateral 2021    Procedure: BLOCK, NERVE, SYMPATHIC;  Surgeon: Holden Pereira MD;  Location: [facility] PAIN MGT;  Service: Pain Management;  Laterality: Bilateral;     INJECTION OF ANESTHETIC AGENT AROUND NERVE N/A 2021    Procedure: BLOCK, NERVE, SYMPATHETIC  need consent;  Surgeon: Holden Pereira MD;  Location: [facility];  Service: Pain Management;  Laterality: N/A;    DE EVAL,SWALLOW FUNCTION,CINE/VIDEO RECORD  2021         TONSILLECTOMY       Family History   Problem Relation Age of Onset    COPD Mother     Lupus Mother     Hernia Mother     Uterine cancer Mother         vs cervical cancer    Ovarian cancer Mother     Diabetes Father     Coronary artery disease Father     Colon cancer Maternal Grandmother         in her 50's     Social History     Substance and Sexual Activity   Alcohol Use No     Social History     Tobacco Use   Smoking Status Former Smoker    Quit date: 2020    Years since quittin.5   Smokeless Tobacco Never Used     Social History     Substance and Sexual Activity   Drug Use No       Current Outpatient Medications:     acetaminophen (TYLENOL) 500 MG tablet, Take 2 tablets (1,000 mg total) by mouth every 8 (eight) hours as needed for Pain., Disp: , Rfl: 0    aspirin (ECOTRIN) 81 MG EC tablet, Take 1 tablet (81 mg total) by mouth once daily., Disp: 30 tablet, Rfl: 0    blood sugar diagnostic Strp, 1 each by Misc.(Non-Drug; Combo Route) route 4 (four) times daily., Disp: 200 each, Rfl: 0    blood-glucose meter Misc, Follow package directions (Patient taking differently: Follow package directions), Disp: 1 each, Rfl: 0    blood-glucose meter,continuous (DEXCOM G6 ) Misc, 1 each by Misc.(Non-Drug; Combo Route) route continuous prn., Disp: 1 each, Rfl: PRN    blood-glucose sensor (DEXCOM G6 SENSOR) Nicole, 3 each by Misc.(Non-Drug; Combo Route) route continuous prn. Change every 10 days., Disp: 3 each, Rfl: PRN    blood-glucose transmitter (DEXCOM G6 TRANSMITTER) Nicole, 1 each by Misc.(Non-Drug; Combo Route) route continuous prn., Disp: 1 each, Rfl: PRN    cloNIDine (CATAPRES) 0.1 MG tablet, Take 1 tablet (0.1 mg  total) by mouth 2 (two) times daily., Disp: 180 tablet, Rfl: 1    clopidogreL (PLAVIX) 75 mg tablet, Take 1 tablet (75 mg total) by mouth once daily., Disp: 30 tablet, Rfl: 11    furosemide (LASIX) 40 MG tablet, Take 1 tablet (40 mg total) by mouth once daily., Disp: 30 tablet, Rfl: 11    HYDROcodone-acetaminophen (NORCO)  mg per tablet, Take 1 tablet by mouth every 8 (eight) hours as needed for Pain., Disp: 90 tablet, Rfl: 0    insulin aspart U-100 (NOVOLOG) 100 unit/mL (3 mL) InPn pen, Inject 12 Units into the skin 3 (three) times daily with meals. Plus correction scale. Max TDD 40units., Disp: 15 mL, Rfl: 3    insulin detemir U-100 (LEVEMIR FLEXTOUCH U-100 INSULN) 100 unit/mL (3 mL) InPn pen, Inject 20 Units into the skin 2 (two) times daily., Disp: 15 mL, Rfl: 3    ipratropium-albuteroL (COMBIVENT)  mcg/actuation inhaler, Inhale 1 puff into the lungs by mouth every 6 (six) hours., Disp: 4 g, Rfl: 0    lancets 33 gauge Misc, Use 4 (four) times daily., Disp: 200 each, Rfl: 0    losartan (COZAAR) 50 MG tablet, Take 1 tablet (50 mg total) by mouth once daily., Disp: 90 tablet, Rfl: 0    methocarbamoL (ROBAXIN) 500 MG Tab, Take 1 tablet (500 mg total) by mouth 3 (three) times daily as needed (muscle spasm)., Disp: 90 tablet, Rfl: 0    metoclopramide HCl (REGLAN) 5 MG tablet, Take 1 tablet (5 mg total) by mouth 4 (four) times daily as needed (nausea, prevention)., Disp: 30 tablet, Rfl: 3    metoprolol succinate (TOPROL-XL) 50 MG 24 hr tablet, Take 1 tablet (50 mg total) by mouth 2 (two) times daily., Disp: 60 tablet, Rfl: 0    NIFEdipine (PROCARDIA-XL) 30 MG (OSM) 24 hr tablet, Take 1 tablet (30 mg total) by mouth 2 (two) times a day., Disp: 180 tablet, Rfl: 0    nitroGLYCERIN (NITROSTAT) 0.4 MG SL tablet, Place 1 tablet (0.4 mg total) under the tongue every 5 (five) minutes as needed for Chest pain., Disp: 25 tablet, Rfl: 0    pantoprazole (PROTONIX) 40 MG tablet, Take 1 tablet (40 mg total)  "by mouth once daily., Disp: 30 tablet, Rfl: 0    pen needle, diabetic (BD ULTRA-FINE HIEN PEN NEEDLE) 32 gauge x 5/32" Ndle, 1 each by Misc.(Non-Drug; Combo Route) route 4 (four) times daily., Disp: 400 each, Rfl: 2    pregabalin (LYRICA) 150 MG capsule, Take 1 capsule (150 mg total) by mouth 3 (three) times daily., Disp: 90 capsule, Rfl: 2    promethazine (PHENERGAN) 25 MG tablet, Take 1 tablet (25 mg total) by mouth every 6 (six) hours as needed for Nausea., Disp: 30 tablet, Rfl: 0    senna-docusate 8.6-50 mg (PERICOLACE) 8.6-50 mg per tablet, Take 1 tablet by mouth 2 (two) times daily as needed for Constipation., Disp: 60 tablet, Rfl: 0    sertraline (ZOLOFT) 100 MG tablet, Take 1 tablet (100 mg total) by mouth once daily., Disp: 90 tablet, Rfl: 3    triamcinolone acetonide 0.1% (KENALOG) 0.1 % cream, Apply to affected areas of body twice daily as needed rash. Do not use on face, underarms, or groin., Disp: 454 g, Rfl: 0    TRUEPLUS LANCETS 30 gauge Misc, USE FOR TESTING FOUR TIMES DAILY, Disp: 200 each, Rfl: 2     Objective:         There were no vitals filed for this visit.  Physical Exam  Virtual visit   Reviewed old and all outside pertinent medical records available.    All lab results personally reviewed and interpreted by me.  Lab Results   Component Value Date    WBC 6.09 05/07/2022    HGB 10.0 (L) 05/07/2022    HCT 31.1 (L) 05/07/2022    MCV 91 05/07/2022    MCH 29.2 05/07/2022    MCHC 32.2 05/07/2022    RDW 14.3 05/07/2022     05/07/2022    MPV 11.9 05/07/2022    PLTEST Appears normal 10/01/2021       Lab Results   Component Value Date     05/07/2022    K 4.1 05/07/2022     05/07/2022    CO2 22 (L) 05/07/2022     (H) 05/07/2022    BUN 19 05/07/2022    CALCIUM 8.0 (L) 05/07/2022    PROT 7.9 04/14/2022    ALBUMIN 3.0 (L) 05/05/2022    BILITOT 0.2 04/14/2022    AST 12 04/14/2022    ALKPHOS 110 04/14/2022    ALT 8 (L) 04/14/2022       Lab Results   Component Value Date    " COLORU Yellow 05/04/2022    APPEARANCEUA Clear 05/04/2022    SPECGRAV 1.010 05/04/2022    PHUR 6.0 05/04/2022    PROTEINUA Negative 05/04/2022    KETONESU Negative 05/04/2022    LEUKOCYTESUR Negative 05/04/2022    NITRITE Negative 05/04/2022    UROBILINOGEN Negative 05/04/2022       Lab Results   Component Value Date    CRP 9.0 (H) 04/14/2022       Lab Results   Component Value Date    SEDRATE 55 (H) 04/14/2022       Lab Results   Component Value Date    RF <13.0 04/14/2022    SEDRATE 55 (H) 04/14/2022       No components found for: 25OHVITDTOT, 54CZOIJH0, 32OVFTRN9, METHODNOTE    Lab Results   Component Value Date    URICACID 6.4 (H) 04/14/2022       No components found for: TSPOTTB        Imaging:  All imaging reviewed and independently interpreted by me.         ASSESSMENT / PLAN:     Indira Waters is a 77 y.o. White female with:      1. Fibromyalgia with Chronic Pain Syndrome   - active   - she is on Lyrica, Robaxin, Norco  - can consider adding Cymbalta/Savella but will leave to discretion of primary team   - continue following with Pain  - sleep hygiene and stress management reinforced   - PT   - reassurance and exercise      2. Primary osteoarthritis involving multiple joints  - I believe her current complaints are due to overlap of CPS/OA/FM, which will make her pain goals harder to achieve   - stable   - pain meds per Pain team   - wt loss  - reassurance and exercise     3. Leukocytoclastic vasculitis  - patient with hx of LCV with +IgA    - it is possible this was a drug induced reaction   - no evidence of ongoing CTD     - reassurance     4. Other specified counseling  - over 10 minutes spent regarding below topics:  - Immunization counseling done.  - Weight loss counseling done.  - Nutrition and exercise counseling.  - Limitation of alcohol consumption.  - Regular exercise:  Aerobic and resistance.  - Medication counseling provided.    5. Obesity  - would benefit from decreasing at least 10% of  body weight.  - recommended goal of losing 1 lb per week.  - consider nutritionist evaluation.      Follow up in about 6 months (around 11/17/2022).    Method of contact with patient concerns: [facility]    Disclaimer:  This note is prepared using voice recognition software and as such is likely to have errors and has not been proof read. Please contact me for questions.     Time spent: 30 minutes in face to face discussion concerning diagnosis, prognosis, review of lab and test results, benefits of treatment as well as management of disease, counseling of patient and coordination of care between various health care providers.  Greater than half the time spent was used for coordination of care and counseling of patient.    Jayesh Boggs M.D.  Rheumatology Department   [facility]

## 2022-05-29 DIAGNOSIS — I20.89 ANGINA AT REST: ICD-10-CM

## 2022-05-29 DIAGNOSIS — D69.2 PALPABLE PURPURA: ICD-10-CM

## 2022-05-29 DIAGNOSIS — R11.0 NAUSEA: ICD-10-CM

## 2022-05-29 DIAGNOSIS — I10 ESSENTIAL HYPERTENSION: ICD-10-CM

## 2022-05-30 RX ORDER — PROMETHAZINE HYDROCHLORIDE 25 MG/1
25 TABLET ORAL EVERY 6 HOURS PRN
Qty: 30 TABLET | Refills: 0 | Status: SHIPPED | OUTPATIENT
Start: 2022-05-30 | End: 2022-06-20

## 2022-05-30 RX ORDER — CLONIDINE HYDROCHLORIDE 0.1 MG/1
0.1 TABLET ORAL 2 TIMES DAILY
Qty: 180 TABLET | Refills: 0 | Status: SHIPPED | OUTPATIENT
Start: 2022-05-30 | End: 2022-08-21 | Stop reason: SDUPTHER

## 2022-05-30 RX ORDER — TRIAMCINOLONE ACETONIDE 1 MG/G
CREAM TOPICAL
Qty: 454 G | Refills: 0 | Status: SHIPPED | OUTPATIENT
Start: 2022-05-30 | End: 2022-08-21 | Stop reason: SDUPTHER

## 2022-05-30 RX ORDER — PEN NEEDLE, DIABETIC 30 GX3/16"
1 NEEDLE, DISPOSABLE MISCELLANEOUS 4 TIMES DAILY
Qty: 400 EACH | Refills: 1 | Status: SHIPPED | OUTPATIENT
Start: 2022-05-30 | End: 2022-10-24 | Stop reason: SDUPTHER

## 2022-05-30 RX ORDER — NITROGLYCERIN 0.4 MG/1
0.4 TABLET SUBLINGUAL EVERY 5 MIN PRN
Qty: 25 TABLET | Refills: 0 | Status: SHIPPED | OUTPATIENT
Start: 2022-05-30 | End: 2022-07-12 | Stop reason: SDUPTHER

## 2022-05-30 RX ORDER — METOPROLOL SUCCINATE 50 MG/1
50 TABLET, EXTENDED RELEASE ORAL 2 TIMES DAILY
Qty: 180 TABLET | Refills: 0 | Status: SHIPPED | OUTPATIENT
Start: 2022-05-30 | End: 2022-06-20

## 2022-05-30 NOTE — TELEPHONE ENCOUNTER
No new care gaps identified.  [facility] Embedded Care Gaps. Reference number: [id number]. 5/29/2022   9:35:38 PM CDT

## 2022-05-30 NOTE — TELEPHONE ENCOUNTER
No new care gaps identified.  [facility] Embedded Care Gaps. Reference number: [id number]. 5/29/2022   9:37:50 PM CDT

## 2022-06-01 ENCOUNTER — TELEPHONE (OUTPATIENT)
Dept: PRIMARY CARE CLINIC | Facility: CLINIC | Age: 77
End: 2022-06-01
Payer: MEDICARE

## 2022-06-01 ENCOUNTER — OUTPATIENT CASE MANAGEMENT (OUTPATIENT)
Dept: ADMINISTRATIVE | Facility: OTHER | Age: 77
End: 2022-06-01
Payer: MEDICARE

## 2022-06-01 DIAGNOSIS — Z79.4 TYPE 2 DIABETES MELLITUS WITH HYPERGLYCEMIA, WITH LONG-TERM CURRENT USE OF INSULIN: ICD-10-CM

## 2022-06-01 DIAGNOSIS — I10 ESSENTIAL HYPERTENSION: ICD-10-CM

## 2022-06-01 DIAGNOSIS — E11.65 TYPE 2 DIABETES MELLITUS WITH HYPERGLYCEMIA, WITH LONG-TERM CURRENT USE OF INSULIN: ICD-10-CM

## 2022-06-01 DIAGNOSIS — I25.118 CORONARY ARTERY DISEASE OF NATIVE ARTERY OF NATIVE HEART WITH STABLE ANGINA PECTORIS: Primary | ICD-10-CM

## 2022-06-01 NOTE — TELEPHONE ENCOUNTER
Dr Zapata- Your patient has a complex medical profile, advanced age, debility. She is appropriate for a referral to [facility].     If you are in agreement with transferring this patient to [facility], could you reach out to family to explain that you recommend the clinic, and describe to them why the services of the clinic would benefit their loved one (care coordination, home visits, mobile NPs, addressing barriers to care)? If they agree, please place a referral to [facility] ([id number]).     Thank you,  Narcisa Marshall MD/MPH  [facility]  [phone] jorge

## 2022-06-01 NOTE — LETTER
June 6, 2022    Indira Shaw Waters  [address]  [address]  [place] [address]             [facility]  [address]  [place] [address] Dear Mrs Waters:      Welcome to [facility] Complex Care Management Program.  It was a pleasure talking with you today.  My name is Eden Plascencia and I look forward to being your Care Manager.   I am available Monday - Wednesday 8:00 am - 4:30 pm. My goal is to help you function at the healthiest and highest level possible.  You can contact me directly at [phone]. Monday through Wednesday 8 - 4:30 pm.     As an [facility] patient, some of the services we may be able to provide include:      Development of an individualized care plan with a Registered Nurse    Connection with a    Connection with available resources and services     Coordinate communication among your care team members    Provide coaching and education    Help you understand your doctors treatment plan   Help you obtain information about your insurance coverage.     All services provided by [facility]s Complex Care Managers and other care team members are coordinated with and communicated to your primary care team.    As part of your enrollment, you will be receiving education materials and more information about these services in your [facility] account, by phone or through the mail.  If you do not wish to participate or receive information, please contact our office at [phone].      Sincerely,    Eden Plascencia RN,[facility]  Outpatient Complex Case Management  [facility]   [phone] cell phone   [phone] office phone

## 2022-06-01 NOTE — TELEPHONE ENCOUNTER
----- Message from Elijah Le MA sent at 6/1/2022  4:50 PM CDT -----  Regarding: FW: patient review please    ----- Message -----  From: Eden Plascencia RN  Sent: 6/1/2022   4:40 PM CDT  To: Mei Abreu RN, [facility]zm[facility] Staff  Subject: patient review please                            Dr. FLORES,    Pt with multiple co-morbid conditions and uncontrolled dm.  Not always compliant with follow up visits and I think the amount of providers following pt is overwhelming to pt and family.  Could you review this case and see if you feel she is appropriate for you clinic.  Pt is agreeable and would welcome your expertise in her care.      Thank you,  Eden Plascencia RN,[facility]  Outpatient Complex Case Management  [phone]

## 2022-06-02 ENCOUNTER — TELEPHONE (OUTPATIENT)
Dept: PRIMARY CARE CLINIC | Facility: CLINIC | Age: 77
End: 2022-06-02
Payer: MEDICARE

## 2022-06-02 NOTE — TELEPHONE ENCOUNTER
----- Message from Eden Plascencia RN sent at 6/1/2022  4:35 PM CDT -----  Regarding: patient review please  Dr. FLORES,    Pt with multiple co-morbid conditions and uncontrolled dm.  Not always compliant with follow up visits and I think the amount of providers following pt is overwhelming to pt and family.  Could you review this case and see if you feel she is appropriate for you clinic.  Pt is agreeable and would welcome your expertise in her care.      Thank you,  Eden Plascencia RN,[facility]  Outpatient Complex Case Management  [phone]

## 2022-06-06 ENCOUNTER — TELEPHONE (OUTPATIENT)
Dept: PRIMARY CARE CLINIC | Facility: CLINIC | Age: 77
End: 2022-06-06
Payer: MEDICARE

## 2022-06-06 ENCOUNTER — OUTPATIENT CASE MANAGEMENT (OUTPATIENT)
Dept: ADMINISTRATIVE | Facility: OTHER | Age: 77
End: 2022-06-06
Payer: MEDICARE

## 2022-06-06 NOTE — PROGRESS NOTES
Outpatient Care Management  Plan of Care Follow Up Visit    Patient: Indira Waters  MRN: [id number]  Date of Service: 06/06/2022  Completed by: Eden Plascencia RN  Referral Date: 05/09/2022  Program: Case Management (High Risk)    Reason for Visit   Patient presents with    OPCM Chart Review    Follow-up    Update Plan Of Care       Brief Summary:       Next steps from previous encounter  Fu on message to dr storm about assuming care of pt  This is noted and pt is scheduled  Follow up on receipt of education material sent in mail not received yet  Fu on blood sugar range and diet done    Interventions  Chart reviewed  Reviewed upcoming appt schedule    Assess/review short term goals met       Next steps  Review educational information on diabetes management  Fu on appointment with dr celeste  6/10  Follow up on receipt of education material sent in mail  Fu on blood sugar range and diet    Patient Summary     Involvement of Care:  Do I have permission to speak with other family members about your care?       Patient Reported Labs & Vitals:  1.  Any Patient Reported Labs & Vitals?     2.  Patient Reported Blood Pressure:     3.  Patient Reported Pulse:     4.  Patient Reported Weight (Kg):     5.  Patient Reported Blood Glucose (mg/dl):       Medical and social history was reviewed with patient and/or caregiver.     Clinical Assessment     Reviewed and provided basic information on available community resources for mental health, transportation, wellness resources, and palliative care programs with patient and/or caregiver.     Complex Care Plan     Care plan was discussed and completed today with input from patient and/or caregiver.    Patient Instructions     Instructions were provided via the [employer] patient resources and are available for the patient to view on the patient portal.    Todays OPCM Self-Management Care Plan was developed with the patients/caregivers input and was based on identified barriers from  todays assessment.  Goals were written today with the patient/caregiver and the patient has agreed to work towards these goals to improve his/her overall well-being. Patient verbalized understanding of the care plan, goals, and all of today's instructions. Encouraged patient/caregiver to communicate with his/her physician and health care team about health conditions and the treatment plan.  Provided my contact information today and encouraged patient/caregiver to call me with any questions as needed.

## 2022-06-06 NOTE — TELEPHONE ENCOUNTER
----- Message from Eden Plascencia RN sent at 6/6/2022  3:02 PM CDT -----  6/6/2022       Dr. Queen,  Thank you for reaching out to pt and pcp to facilitate transition of care to you and the [facility] team.  I will continue to follow pt as well and provide case management and support to pt and her family.  I will focus on diabetes management.  Let me know if there is anything else you need me to do to assist with her care.      Sincerely,    Eden Plascencia RN,[facility]  Outpatient Complex Case Management  [phone]

## 2022-06-06 NOTE — TELEPHONE ENCOUNTER
Pt has been in hospital, needed earlier appt, moved pt appt to 6/10/2022, called pt dtr, no answer. Not able to leave message for call back.

## 2022-06-07 ENCOUNTER — PATIENT MESSAGE (OUTPATIENT)
Dept: ADMINISTRATIVE | Facility: OTHER | Age: 77
End: 2022-06-07
Payer: MEDICARE

## 2022-06-08 ENCOUNTER — OUTPATIENT CASE MANAGEMENT (OUTPATIENT)
Dept: ADMINISTRATIVE | Facility: OTHER | Age: 77
End: 2022-06-08
Payer: MEDICARE

## 2022-06-09 ENCOUNTER — TELEPHONE (OUTPATIENT)
Dept: ADMINISTRATIVE | Facility: HOSPITAL | Age: 77
End: 2022-06-09
Payer: MEDICARE

## 2022-06-10 ENCOUNTER — TELEPHONE (OUTPATIENT)
Dept: PRIMARY CARE CLINIC | Facility: CLINIC | Age: 77
End: 2022-06-10
Payer: MEDICARE

## 2022-06-10 NOTE — TELEPHONE ENCOUNTER
----- Message from Zarina Jacinto sent at 6/10/2022  1:44 PM CDT -----  Contact: [phone] cat  Pts daughter is calling she cancelled the pts appt for today for a hospital follow up and she is asking for another appt please give return call

## 2022-06-13 ENCOUNTER — OUTPATIENT CASE MANAGEMENT (OUTPATIENT)
Dept: ADMINISTRATIVE | Facility: OTHER | Age: 77
End: 2022-06-13
Payer: MEDICARE

## 2022-06-13 DIAGNOSIS — E08.42 DIABETIC POLYNEUROPATHY ASSOCIATED WITH DIABETES MELLITUS DUE TO UNDERLYING CONDITION: ICD-10-CM

## 2022-06-13 DIAGNOSIS — G89.4 CHRONIC PAIN SYNDROME: ICD-10-CM

## 2022-06-13 DIAGNOSIS — G89.4 CHRONIC PAIN DISORDER: ICD-10-CM

## 2022-06-13 RX ORDER — HYDROCODONE BITARTRATE AND ACETAMINOPHEN 10; 325 MG/1; MG/1
1 TABLET ORAL EVERY 8 HOURS PRN
Qty: 90 TABLET | Refills: 0 | Status: SHIPPED | OUTPATIENT
Start: 2022-06-13 | End: 2022-07-12 | Stop reason: SDUPTHER

## 2022-06-13 RX ORDER — PREGABALIN 150 MG/1
150 CAPSULE ORAL 3 TIMES DAILY
Qty: 90 CAPSULE | Refills: 2 | Status: SHIPPED | OUTPATIENT
Start: 2022-06-13 | End: 2022-09-12 | Stop reason: SDUPTHER

## 2022-06-13 RX ORDER — METHOCARBAMOL 500 MG/1
500 TABLET, FILM COATED ORAL 3 TIMES DAILY PRN
Qty: 90 TABLET | Refills: 0 | Status: SHIPPED | OUTPATIENT
Start: 2022-06-13 | End: 2022-07-12 | Stop reason: SDUPTHER

## 2022-06-13 NOTE — PROGRESS NOTES
Outpatient Care Management  Plan of Care Follow Up Visit    Patient: Indira Waters  MRN: [id number]  Date of Service: 06/13/2022  Completed by: Eden Plascencia RN  Referral Date: 05/09/2022  Program: Case Management (High Risk)    Reason for Visit   Patient presents with    OPCM Chart Review    Update Plan Of Care    Follow-up       Brief Summary:   Pt states cx appt with kj last week due to kidney stone and rescheduled for 6/16  bs 344      Next steps from previous encounter  Review educational information on diabetes management  Fu on appointment with dr celeste  6/10  cx per daughter rescheduled for 6/15  Follow up on receipt of education material sent in mail  Fu on blood sugar range and diet    Interventions  Chart reviewed  Reviewed upcoming appt schedule    Assess/review short term goals met       Next steps  Review educational information on diabetes management  Fu on appointment with dr celeste  6/15  Follow up on receipt of education material sent in mail  Fu on blood sugar range and diet        Patient Summary     Involvement of Care:  Do I have permission to speak with other family members about your care?       Patient Reported Labs & Vitals:  1.  Any Patient Reported Labs & Vitals?     2.  Patient Reported Blood Pressure:     3.  Patient Reported Pulse:     4.  Patient Reported Weight (Kg):     5.  Patient Reported Blood Glucose (mg/dl):       Medical and social history was reviewed with patient and/or caregiver.     Clinical Assessment     Reviewed and provided basic information on available community resources for mental health, transportation, wellness resources, and palliative care programs with patient and/or caregiver.     Complex Care Plan     Care plan was discussed and completed today with input from patient and/or caregiver.    Patient Instructions     Instructions were provided via the [employer] patient resources and are available for the patient to view on the patient portal.      Todays OPCM  Self-Management Care Plan was developed with the patients/caregivers input and was based on identified barriers from todays assessment.  Goals were written today with the patient/caregiver and the patient has agreed to work towards these goals to improve his/her overall well-being. Patient verbalized understanding of the care plan, goals, and all of today's instructions. Encouraged patient/caregiver to communicate with his/her physician and health care team about health conditions and the treatment plan.  Provided my contact information today and encouraged patient/caregiver to call me with any questions as needed.

## 2022-06-13 NOTE — TELEPHONE ENCOUNTER
Patient requesting refill on Norco 10/325mg  Last office visit 04.14.22   shows last refill on 05.13.22  Patient does have a pain contract on file with [facility]  Patient last UDS 02.14.22 was consistent with current therapy    Patient requesting refill on lyrica 150mg  Last office visit 04.14.22   shows last refill on 05.12.22  Patient does have a pain contract on file with [facility]  Patient last UDS 02.14.22 was consistent with current therapy    CODEINE  Not Detected     MORPHINE  Not Detected     6-ACETYLMORPHINE  Not Detected     OXYCODONE  Not Detected     NOROYXCODONE  Not Detected     OXYMORPHONE  Not Detected     NOROXYMORPHONE  Not Detected     HYDROCODONE  Present     NORHYDROCODONE  Present     HYDROMORPHONE  Present     BUPRENORPHINE  Not Detected     NORUBPRENORPHINE  Not Detected     FENTANYL  Not Detected     NORFENTANYL  Not Detected     MEPERIDINE METABOLITE  Not Detected     TAPENTADOL  Not Detected     TAPENTADOL-O-SULF  Not Detected     METHADONE  Not Detected     TRAMADOL  Not Detected     AMPHETAMINE  Not Detected     METHAMPHETAMINE  Not Detected     MDMA- ECSTASY  Not Detected     MDA  Not Detected     MDEA- Mary  Not Detected     METHYLPHENIDATE  Not Detected     PHENTERMINE  Not Detected     BENZOYLECGONINE  Not Detected     ALPRAZOLAM  Not Detected     ALPHA-OH-ALPRAZOLAM  Not Detected     CLONAZEPAM  Not Detected     7-AMINOCLONAZEPAM  Not Detected     DIAZEPAM  Not Detected     NORDIAZEPAM  Not Detected     OXAZEPAM  Not Detected     TEMAZEPAM  Not Detected     Lorazepam  Not Detected     MIDAZOLAM  Not Detected     ZOLPIDEM  Not Detected     BARBITURATES  Not Detected     Creatinine, Urine 20.0 - 400.0 mg/dL 62.7  36.9 R, CM    ETHYL GLUCURONIDE  Not Detected     MARIJUANA METABOLITE  Not Detected     PCP  Not Detected     CARISOPRODOL  Not Detected     Comment: The carisoprodol immunoassay has cross-reactivity to    carisoprodol and meprobamate.    Naloxone  Not Detected     Gabapentin  Not Detected     Pregabalin  Present     Alpha-OH-Midazolam  Not Detected     Zolpidem Metabolite  Not Detected

## 2022-06-13 NOTE — TELEPHONE ENCOUNTER
----- Message from Vivian Portillo sent at 6/13/2022  9:02 AM CDT -----  Regarding: Medication  Name of Who is Calling:Esha Severino           What is the request in detail: Patients daughter is requesting a call back in reference to  medication that is no longer listed under her mother medication list             Can the clinic reply by [facility]: No           What Number to Call Back if not in [facility]: [phone]

## 2022-06-20 ENCOUNTER — HOSPITAL ENCOUNTER (OUTPATIENT)
Dept: RADIOLOGY | Facility: OTHER | Age: 77
Discharge: HOME | End: 2022-06-20
Attending: STUDENT IN AN ORGANIZED HEALTH CARE EDUCATION/TRAINING PROGRAM
Payer: MEDICARE

## 2022-06-20 ENCOUNTER — PATIENT MESSAGE (OUTPATIENT)
Dept: INTERNAL MEDICINE | Facility: CLINIC | Age: 77
End: 2022-06-20

## 2022-06-20 ENCOUNTER — TELEPHONE (OUTPATIENT)
Dept: INTERNAL MEDICINE | Facility: CLINIC | Age: 77
End: 2022-06-20
Payer: MEDICARE

## 2022-06-20 ENCOUNTER — OFFICE VISIT (OUTPATIENT)
Dept: INTERNAL MEDICINE | Facility: CLINIC | Age: 77
End: 2022-06-20
Payer: MEDICARE

## 2022-06-20 ENCOUNTER — OUTPATIENT CASE MANAGEMENT (OUTPATIENT)
Dept: ADMINISTRATIVE | Facility: OTHER | Age: 77
End: 2022-06-20
Payer: MEDICARE

## 2022-06-20 VITALS
OXYGEN SATURATION: 96 % | HEIGHT: 60 IN | WEIGHT: 182.75 LBS | HEART RATE: 74 BPM | DIASTOLIC BLOOD PRESSURE: 90 MMHG | BODY MASS INDEX: 35.88 KG/M2 | SYSTOLIC BLOOD PRESSURE: 120 MMHG

## 2022-06-20 DIAGNOSIS — F32.A DEPRESSION, UNSPECIFIED DEPRESSION TYPE: Chronic | ICD-10-CM

## 2022-06-20 DIAGNOSIS — R11.2 NAUSEA AND VOMITING, INTRACTABILITY OF VOMITING NOT SPECIFIED, UNSPECIFIED VOMITING TYPE: ICD-10-CM

## 2022-06-20 DIAGNOSIS — I10 ESSENTIAL HYPERTENSION: ICD-10-CM

## 2022-06-20 DIAGNOSIS — Z91.199 NONCOMPLIANCE: ICD-10-CM

## 2022-06-20 DIAGNOSIS — I25.84 CORONARY ARTERY CALCIFICATION: ICD-10-CM

## 2022-06-20 DIAGNOSIS — K21.9 GASTROESOPHAGEAL REFLUX DISEASE WITHOUT ESOPHAGITIS: Chronic | ICD-10-CM

## 2022-06-20 DIAGNOSIS — R10.31 RIGHT LOWER QUADRANT ABDOMINAL PAIN: ICD-10-CM

## 2022-06-20 DIAGNOSIS — N95.9 POST MENOPAUSAL PROBLEMS: ICD-10-CM

## 2022-06-20 DIAGNOSIS — D64.9 NORMOCYTIC ANEMIA: ICD-10-CM

## 2022-06-20 DIAGNOSIS — Z85.41 HISTORY OF CERVICAL CANCER: ICD-10-CM

## 2022-06-20 DIAGNOSIS — N20.0 NEPHROLITHIASIS: ICD-10-CM

## 2022-06-20 DIAGNOSIS — F33.40 RECURRENT MAJOR DEPRESSIVE DISORDER, IN REMISSION: ICD-10-CM

## 2022-06-20 DIAGNOSIS — M54.15 RADICULOPATHY OF THORACOLUMBAR REGION: ICD-10-CM

## 2022-06-20 DIAGNOSIS — M79.2 NEUROPATHIC PAIN: ICD-10-CM

## 2022-06-20 DIAGNOSIS — G89.4 CHRONIC PAIN SYNDROME: Chronic | ICD-10-CM

## 2022-06-20 DIAGNOSIS — I25.10 CORONARY ARTERY CALCIFICATION: ICD-10-CM

## 2022-06-20 DIAGNOSIS — R20.9 UNSPECIFIED DISTURBANCES OF SKIN SENSATION: ICD-10-CM

## 2022-06-20 DIAGNOSIS — N18.4 STAGE 4 CHRONIC KIDNEY DISEASE: ICD-10-CM

## 2022-06-20 DIAGNOSIS — Z79.4 TYPE 2 DIABETES MELLITUS WITH HYPERGLYCEMIA, WITH LONG-TERM CURRENT USE OF INSULIN: Primary | ICD-10-CM

## 2022-06-20 DIAGNOSIS — Z87.442 HISTORY OF RENAL CALCULI: ICD-10-CM

## 2022-06-20 DIAGNOSIS — E11.65 TYPE 2 DIABETES MELLITUS WITH HYPERGLYCEMIA, WITH LONG-TERM CURRENT USE OF INSULIN: Primary | ICD-10-CM

## 2022-06-20 DIAGNOSIS — I25.118 CORONARY ARTERY DISEASE OF NATIVE ARTERY OF NATIVE HEART WITH STABLE ANGINA PECTORIS: ICD-10-CM

## 2022-06-20 DIAGNOSIS — E79.0 ELEVATED URIC ACID IN BLOOD: ICD-10-CM

## 2022-06-20 DIAGNOSIS — F41.1 GENERALIZED ANXIETY DISORDER: ICD-10-CM

## 2022-06-20 DIAGNOSIS — E78.01 FAMILIAL HYPERCHOLESTEROLEMIA: ICD-10-CM

## 2022-06-20 PROBLEM — R19.7 DIARRHEA: Status: RESOLVED | Noted: 2021-09-11 | Resolved: 2022-06-20

## 2022-06-20 PROCEDURE — 99215 OFFICE O/P EST HI 40 MIN: CPT | Mod: S$PBB,,, | Performed by: STUDENT IN AN ORGANIZED HEALTH CARE EDUCATION/TRAINING PROGRAM

## 2022-06-20 PROCEDURE — 99215 PR OFFICE/OUTPT VISIT, EST, LEVL V, 40-54 MIN: ICD-10-PCS | Mod: S$PBB,,, | Performed by: STUDENT IN AN ORGANIZED HEALTH CARE EDUCATION/TRAINING PROGRAM

## 2022-06-20 PROCEDURE — 99999 PR PBB SHADOW E&M-EST. PATIENT-LVL V: CPT | Mod: PBBFAC,,, | Performed by: STUDENT IN AN ORGANIZED HEALTH CARE EDUCATION/TRAINING PROGRAM

## 2022-06-20 PROCEDURE — 74176 CT ABD & PELVIS W/O CONTRAST: CPT | Mod: 26,,, | Performed by: INTERNAL MEDICINE

## 2022-06-20 PROCEDURE — 99999 PR PBB SHADOW E&M-EST. PATIENT-LVL V: ICD-10-PCS | Mod: PBBFAC,,, | Performed by: STUDENT IN AN ORGANIZED HEALTH CARE EDUCATION/TRAINING PROGRAM

## 2022-06-20 PROCEDURE — 74176 CT ABD & PELVIS W/O CONTRAST: CPT | Mod: TC

## 2022-06-20 PROCEDURE — 99215 OFFICE O/P EST HI 40 MIN: CPT | Mod: PBBFAC,25 | Performed by: STUDENT IN AN ORGANIZED HEALTH CARE EDUCATION/TRAINING PROGRAM

## 2022-06-20 PROCEDURE — 74176 CT RENAL STONE STUDY ABD PELVIS WO: ICD-10-PCS | Mod: 26,,, | Performed by: INTERNAL MEDICINE

## 2022-06-20 RX ORDER — EVOLOCUMAB 140 MG/ML
140 INJECTION, SOLUTION SUBCUTANEOUS
Qty: 8 EACH | Refills: 3 | OUTPATIENT
Start: 2022-06-20 | End: 2022-06-22 | Stop reason: SDUPTHER

## 2022-06-20 RX ORDER — METOPROLOL SUCCINATE 50 MG/1
50 TABLET, EXTENDED RELEASE ORAL DAILY
Qty: 90 TABLET | Refills: 0 | Status: SHIPPED | OUTPATIENT
Start: 2022-06-20 | End: 2022-09-26 | Stop reason: SDUPTHER

## 2022-06-20 NOTE — PROGRESS NOTES
[facility]    Subjective:      Patient ID: Indira Waters is a 77 y.o. female.    Chief Complaint: Diabetes (F/u) and Flank Pain (Kidney stones)    History was obtained from the patient and supplemented through chart review.  This pt is known to me.    HPI:    Patient is a 77 y.o. female with chronic medical problems including DM, HTN, HLD, CAD, fibromyalgia    -Attempted to get pt into [facility] program but pt declined due to stated goal of getting her off pain meds  -Frequent no-show, cancellations appointments due to fatigue with appts.  Try to prioritize as able  -Frequent discontinuation, changing of dosing of meds on her own    DM  Uncontrolled  11.6 4/14/2022  Difficulty with compliance  In the past Ice cream, grits, oatmeal, apple juice, pasta- spaghetti  Working on affordability of food, new food choices, per daughter  Daughter states glucoses have been better in the AM, no numbers available  levemir 20 BID  Novolog 12 TID  Still with nausea  Went to  Did not ever get dexcom thus far, will attempt again   Attempt to get new Dm educat appt  Suspect gastroparesis, takes phenergan regularly.  Pt states  reglan helps  Delaying scheduling gastric emptying study due to number of other appointments/studies needed. Pt wishes to spread out studies    Severely deconditioned  Exercise encouraged    CAD  Seen by Dr. Montalvo and Dr. Méndez  Wishes to follow with Dr. Méndez  Cardiac Cath 5/6/2022 with significant blockages   Taking ASA 81, plavix  Not taking statin due to vasculitis thought to be 2/2 statin  Repatha prescribed prior, but not continued due to pt preference  Attempted to call twice after visit to discuss and no answer  Ordering again and sent portal message    Normocytic Anemia  Need iron studies, B12, folate  Did not follow up heme onc as requested    Fibromyalgia   Chronic Pain Syndrome  Potential plan for surgical implantation of spinal cord stimulator with Dr. Covington if can  get A1C < 10  Roabxin, norco 10    Lupus?  Not treated in the past    COPD/asthma?  combivent  Stable, breathing better with lasix    HTN  Controlled  Procardia 30 BID, losartan 50 mg daily, clonidine 0.1 mg BID, toprol 50 mg daily (had been refilled bid, fixed back to daily)  Extensive time educating again on clonidine rebound.  Explained about the patch, pt declines.  Mentioned initially skipping doses of clonidine but then stated she's taking consistently due to the way her daughter is laying them out    CHF   Grade II DD  Lasix 40 mg daily  Doing well    Drug induced Vasculitis  Thought to be 2/2 to atorvastatin, switched to repatha q 14 days  Was discontinued in April for unknown reason.  Reordering and sent pt portal message    CKD Stage 3- worsening to CKD Stage 4?  Nephrology referral delayed.  Family missed rescheduling of appt  Try again  Avoid nsaids, nephrotoxic meds    Long standing hx of kidney stones  Hx of lithotripsy in the past  Referral urology  Checking uric acid, had been elevated in the past  CT renal stone study today    Also with constipation    Depression  zoloft 100 mg daily   Stable, mentions long term chronic loss of son since 2007    History of cervical cancer  S/p hyst with 1 ovary remaining  Appt with gyn canceled and not rescheduled in the past  CT renal stone study might assist in f/u; still needs f/u with gyn but pt often gets overwhelmed with multiple appts. Will f/u CT    Medical History  Past Medical History:   Diagnosis Date    Arthritis     Back pain     Cancer     ovarian    Depression     Diabetes mellitus     Fibromyalgia     Hyperlipidemia     Hypertension     Lupus     Stroke     slight left sided weakness       Review of Systems   Constitutional: Negative for fever.        Deconditioned   HENT: Negative for trouble swallowing.    Respiratory: Negative for shortness of breath.    Cardiovascular: Negative for chest pain.   Gastrointestinal: Positive for abdominal  pain and constipation. Negative for nausea and vomiting.   Musculoskeletal: Positive for arthralgias (improving), back pain and gait problem.   Skin: Positive for pallor.   Neurological: Negative for dizziness, seizures and light-headedness.   Psychiatric/Behavioral: Negative for hallucinations.         Surgical hx, family hx, social hx   Have been reviewed      Current Outpatient Medications:     acetaminophen (TYLENOL) 500 MG tablet, Take 2 tablets (1,000 mg total) by mouth every 8 (eight) hours as needed for Pain., Disp: , Rfl: 0    aspirin (ECOTRIN) 81 MG EC tablet, Take 1 tablet (81 mg total) by mouth once daily., Disp: 30 tablet, Rfl: 0    blood sugar diagnostic Strp, 1 each by Misc.(Non-Drug; Combo Route) route 4 (four) times daily., Disp: 200 each, Rfl: 0    blood-glucose meter Misc, Follow package directions (Patient taking differently: Follow package directions), Disp: 1 each, Rfl: 0    blood-glucose meter,continuous (DEXCOM G6 ) Misc, 1 each by Misc.(Non-Drug; Combo Route) route continuous prn., Disp: 1 each, Rfl: PRN    blood-glucose sensor (DEXCOM G6 SENSOR) Nicole, 3 each by Misc.(Non-Drug; Combo Route) route continuous prn. Change every 10 days., Disp: 3 each, Rfl: PRN    blood-glucose transmitter (DEXCOM G6 TRANSMITTER) Nicole, 1 each by Misc.(Non-Drug; Combo Route) route continuous prn., Disp: 1 each, Rfl: PRN    cloNIDine (CATAPRES) 0.1 MG tablet, Take 1 tablet (0.1 mg total) by mouth 2 (two) times daily., Disp: 180 tablet, Rfl: 0    clopidogreL (PLAVIX) 75 mg tablet, Take 1 tablet (75 mg total) by mouth once daily., Disp: 30 tablet, Rfl: 11    furosemide (LASIX) 40 MG tablet, Take 1 tablet (40 mg total) by mouth once daily., Disp: 30 tablet, Rfl: 11    HYDROcodone-acetaminophen (NORCO)  mg per tablet, Take 1 tablet by mouth every 8 (eight) hours as needed for Pain., Disp: 90 tablet, Rfl: 0    insulin aspart U-100 (NOVOLOG) 100 unit/mL (3 mL) InPn pen, Inject 12 Units into the  "skin 3 (three) times daily with meals. Plus correction scale. Max TDD 40units., Disp: 15 mL, Rfl: 3    insulin detemir U-100 (LEVEMIR FLEXTOUCH U-100 INSULN) 100 unit/mL (3 mL) InPn pen, Inject 20 Units into the skin 2 (two) times daily., Disp: 15 mL, Rfl: 3    ipratropium-albuteroL (COMBIVENT)  mcg/actuation inhaler, Inhale 1 puff into the lungs by mouth every 6 (six) hours., Disp: 4 g, Rfl: 0    lancets (TRUEPLUS LANCETS) 30 gauge Misc, Inject 100 lancets into the skin once daily at 6am., Disp: 200 each, Rfl: 2    lancets 33 gauge Misc, Use 4 (four) times daily., Disp: 200 each, Rfl: 0    losartan (COZAAR) 50 MG tablet, Take 1 tablet (50 mg total) by mouth once daily., Disp: 90 tablet, Rfl: 0    methocarbamoL (ROBAXIN) 500 MG Tab, Take 1 tablet (500 mg total) by mouth 3 (three) times daily as needed (muscle spasm)., Disp: 90 tablet, Rfl: 0    metoclopramide HCl (REGLAN) 5 MG tablet, Take 1 tablet (5 mg total) by mouth 4 (four) times daily as needed (nausea, prevention)., Disp: 30 tablet, Rfl: 3    NIFEdipine (PROCARDIA-XL) 30 MG (OSM) 24 hr tablet, Take 1 tablet (30 mg total) by mouth 2 (two) times a day., Disp: 180 tablet, Rfl: 0    nitroGLYCERIN (NITROSTAT) 0.4 MG SL tablet, Place 1 tablet (0.4 mg total) under the tongue every 5 (five) minutes as needed for Chest pain., Disp: 25 tablet, Rfl: 0    pantoprazole (PROTONIX) 40 MG tablet, Take 1 tablet (40 mg total) by mouth once daily., Disp: 30 tablet, Rfl: 0    pen needle, diabetic (BD ULTRA-FINE HIEN PEN NEEDLE) 32 gauge x 5/32" Ndle, 1 each by Misc.(Non-Drug; Combo Route) route 4 (four) times daily., Disp: 400 each, Rfl: 1    pregabalin (LYRICA) 150 MG capsule, Take 1 capsule (150 mg total) by mouth 3 (three) times daily., Disp: 90 capsule, Rfl: 2    senna-docusate 8.6-50 mg (PERICOLACE) 8.6-50 mg per tablet, Take 1 tablet by mouth 2 (two) times daily as needed for Constipation., Disp: 60 tablet, Rfl: 0    sertraline (ZOLOFT) 100 MG tablet, " Take 1 tablet (100 mg total) by mouth once daily., Disp: 90 tablet, Rfl: 3    triamcinolone acetonide 0.1% (KENALOG) 0.1 % cream, Apply to affected areas of body twice daily as needed rash. Do not use on face, underarms, or groin., Disp: 454 g, Rfl: 0    evolocumab (REPATHA SURECLICK) 140 mg/mL PnIj, Inject 1 mL (140 mg total) into the skin every 14 (fourteen) days., Disp: 8 each, Rfl: 3    metoprolol succinate (TOPROL-XL) 50 MG 24 hr tablet, Take 1 tablet (50 mg total) by mouth once daily., Disp: 90 tablet, Rfl: 0    Objective:        Body mass index is 35.69 kg/m².  Vitals:    06/20/22 1549   BP: (!) 120/90   Pulse: 74   SpO2: 96%   Weight: 82.9 kg (182 lb 12.2 oz)   Height: 5' (1.524 m)   PainSc: 10-Worst pain ever   PainLoc: Back     Physical Exam  Vitals and nursing note reviewed.   Constitutional:       General: She is not in acute distress.     Appearance: Normal appearance. She is not ill-appearing.      Comments: Ambulates with cane   HENT:      Head: Normocephalic and atraumatic.   Eyes:      General: No scleral icterus.  Cardiovascular:      Rate and Rhythm: Normal rate and regular rhythm.      Comments: Diminished heart sounds  Pulmonary:      Effort: Pulmonary effort is normal.   Abdominal:      General: There is no distension.      Tenderness: There is abdominal tenderness (RLQ tenderness).   Musculoskeletal:         General: No deformity.      Cervical back: Normal range of motion.      Comments: Trace bilat edema   Skin:     General: Skin is warm and dry.   Neurological:      Mental Status: She is alert and oriented to person, place, and time.   Psychiatric:         Behavior: Behavior normal.             Lab Results   Component Value Date    WBC 6.09 05/07/2022    HGB 10.0 (L) 05/07/2022    HCT 31.1 (L) 05/07/2022     05/07/2022    CHOL 186 10/21/2021    TRIG 195 (H) 10/21/2021    HDL 38 (L) 10/21/2021    ALT 8 (L) 04/14/2022    AST 12 04/14/2022     05/07/2022    K 4.1 05/07/2022      05/07/2022    CREATININE 1.2 05/07/2022    BUN 19 05/07/2022    CO2 22 (L) 05/07/2022    TSH 2.084 04/14/2022    INR 1.0 05/05/2022    HGBA1C 11.6 (H) 04/14/2022       The 10-year ASCVD risk score (Osiris CAMPBELL Jr., et al., 2013) is: 35.5%    Values used to calculate the score:      Age: 77 years      Sex: Female      Is Non- : No      Diabetic: Yes      Tobacco smoker: No      Systolic Blood Pressure: 120 mmHg      Is BP treated: Yes      HDL Cholesterol: 38 mg/dL      Total Cholesterol: 186 mg/dL    Repatha    (Imaging have been independently reviewed)      Assessment:         1. Type 2 diabetes mellitus with hyperglycemia, with long-term current use of insulin    2. Essential hypertension    3. Right lower quadrant abdominal pain    4. Stage 4 chronic kidney disease    5. Nephrolithiasis    6. Noncompliance    7. Nausea and vomiting, intractability of vomiting not specified, unspecified vomiting type    8. Familial hypercholesterolemia    9. 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 pectoris    10. Coronary artery calcification    11. Elevated uric acid in blood    12. Gastroesophageal reflux disease without esophagitis    13. Post menopausal problems    14. Normocytic anemia    15. Unspecified disturbances of skin sensation     16. History of renal calculi    17. Chronic pain syndrome    18. Generalized anxiety disorder    19. Depression, unspecified depression type    20. Neuropathic pain    21. Radiculopathy of thoracolumbar region    22. History of cervical cancer    23. Recurrent major depressive disorder, in remission          Plan:     Indira was seen today for diabetes and flank pain.    Diagnoses and all orders for this visit:    Type 2 diabetes mellitus with hyperglycemia, with long-term current use of insulin  -     Hemoglobin A1C; Future    Essential hypertension  -     metoprolol succinate (TOPROL-XL) 50 MG 24 hr tablet; Take 1 tablet (50 mg  total) by mouth once daily.    Right lower quadrant abdominal pain  -     CT Renal Stone Study ABD Pelvis WO; Future    Stage 4 chronic kidney disease  -     Ambulatory referral/consult to Nephrology; Future  -     Uric Acid; Future    Nephrolithiasis  -     Ambulatory referral/consult to Urology; Future    Noncompliance    Nausea and vomiting, intractability of vomiting not specified, unspecified vomiting type  -     NM Gastric Emptying; Future    Familial hypercholesterolemia  -     evolocumab (REPATHA SURECLICK) 140 mg/mL PnIj; Inject 1 mL (140 mg total) into the skin every 14 (fourteen) days.    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 pectoris  -     evolocumab (REPATHA SURECLICK) 140 mg/mL PnIj; Inject 1 mL (140 mg total) into the skin every 14 (fourteen) days.    Coronary artery calcification  -     evolocumab (REPATHA SURECLICK) 140 mg/mL PnIj; Inject 1 mL (140 mg total) into the skin every 14 (fourteen) days.    Elevated uric acid in blood  -     Uric Acid; Future    Gastroesophageal reflux disease without esophagitis    Post menopausal problems  -     DXA Bone Density Spine And Hip; Future    Normocytic anemia  -     CBC Auto Differential; Future  -     Comprehensive Metabolic Panel; Future  -     Iron and TIBC; Future  -     Ferritin; Future  -     Methylmalonic Acid, Serum; Future  -     Folate; Future  -     Erythropoietin; Future  -     Sedimentation rate; Future  -     Soluble Transferrin Receptor; Future  -     Lactate Dehydrogenase; Future  -     Reticulocytes; Future  -     Vitamin B12; Future    Unspecified disturbances of skin sensation   -     Folate; Future  -     Vitamin B12; Future    History of renal calculi    Chronic pain syndrome    Generalized anxiety disorder    Depression, unspecified depression type    Neuropathic pain    Radiculopathy of thoracolumbar region    History of cervical cancer    Recurrent major depressive disorder, in  remission        Follow up in about 3 months (around 9/20/2022). or sooner prn      72 min were used in chart review, evaluation and counseling of patient re: A1c, compliance, specialists missed, kidney stones, BP meds, and chart review, planning for work-up for gastroparesis later and attempt to investigate anemia without heme onc for now, documentation and review of results on same day of service     All medications were reviewed including potential side effects and risks/benefits.  Pt was counseled to call back if anything worsens or if questions arise.    Chun Zapata MD  Family Medicine  [facility]  [address]  [address]  [place] [address]  Phone [phone]  Fax [phone]

## 2022-06-22 DIAGNOSIS — I25.10 CORONARY ARTERY CALCIFICATION: ICD-10-CM

## 2022-06-22 DIAGNOSIS — I25.118 CORONARY ARTERY DISEASE OF NATIVE ARTERY OF NATIVE HEART WITH STABLE ANGINA PECTORIS: ICD-10-CM

## 2022-06-22 DIAGNOSIS — I25.84 CORONARY ARTERY CALCIFICATION: ICD-10-CM

## 2022-06-22 DIAGNOSIS — E78.01 FAMILIAL HYPERCHOLESTEROLEMIA: ICD-10-CM

## 2022-06-22 RX ORDER — EVOLOCUMAB 140 MG/ML
140 INJECTION, SOLUTION SUBCUTANEOUS
Qty: 8 EACH | Refills: 3 | Status: SHIPPED | OUTPATIENT
Start: 2022-06-22 | End: 2022-06-23 | Stop reason: SDUPTHER

## 2022-06-23 ENCOUNTER — PATIENT MESSAGE (OUTPATIENT)
Dept: INTERNAL MEDICINE | Facility: CLINIC | Age: 77
End: 2022-06-23
Payer: MEDICARE

## 2022-06-23 DIAGNOSIS — I25.84 CORONARY ARTERY CALCIFICATION: ICD-10-CM

## 2022-06-23 DIAGNOSIS — I25.118 CORONARY ARTERY DISEASE OF NATIVE ARTERY OF NATIVE HEART WITH STABLE ANGINA PECTORIS: ICD-10-CM

## 2022-06-23 DIAGNOSIS — E78.01 FAMILIAL HYPERCHOLESTEROLEMIA: ICD-10-CM

## 2022-06-23 DIAGNOSIS — I25.10 CORONARY ARTERY CALCIFICATION: ICD-10-CM

## 2022-06-23 RX ORDER — EVOLOCUMAB 140 MG/ML
140 INJECTION, SOLUTION SUBCUTANEOUS
Qty: 8 EACH | Refills: 3 | Status: SHIPPED | OUTPATIENT
Start: 2022-06-23 | End: 2023-06-26 | Stop reason: SDUPTHER

## 2022-06-23 NOTE — PROGRESS NOTES
3rd follow up attempt to complete high risk assessment.  Unable to leave a voicemail message because pt's voicemail box has not been set up.  Notified OPCM RN re: difficulty making contact with pt.

## 2022-06-27 ENCOUNTER — OFFICE VISIT (OUTPATIENT)
Dept: CARDIOLOGY | Facility: CLINIC | Age: 77
End: 2022-06-27
Payer: MEDICARE

## 2022-06-27 VITALS
WEIGHT: 185 LBS | OXYGEN SATURATION: 94 % | BODY MASS INDEX: 36.13 KG/M2 | SYSTOLIC BLOOD PRESSURE: 126 MMHG | DIASTOLIC BLOOD PRESSURE: 76 MMHG | HEART RATE: 78 BPM

## 2022-06-27 DIAGNOSIS — E78.00 HYPERCHOLESTEROLEMIA: ICD-10-CM

## 2022-06-27 DIAGNOSIS — N18.31 TYPE 2 DIABETES MELLITUS WITH STAGE 3A CHRONIC KIDNEY DISEASE, WITH LONG-TERM CURRENT USE OF INSULIN: ICD-10-CM

## 2022-06-27 DIAGNOSIS — I10 ESSENTIAL HYPERTENSION: ICD-10-CM

## 2022-06-27 DIAGNOSIS — I25.118 ATHEROSCLEROSIS OF NATIVE CORONARY ARTERY OF NATIVE HEART WITH STABLE ANGINA PECTORIS: Primary | ICD-10-CM

## 2022-06-27 DIAGNOSIS — Z79.4 TYPE 2 DIABETES MELLITUS WITH STAGE 3A CHRONIC KIDNEY DISEASE, WITH LONG-TERM CURRENT USE OF INSULIN: ICD-10-CM

## 2022-06-27 DIAGNOSIS — E11.22 TYPE 2 DIABETES MELLITUS WITH STAGE 3A CHRONIC KIDNEY DISEASE, WITH LONG-TERM CURRENT USE OF INSULIN: ICD-10-CM

## 2022-06-27 DIAGNOSIS — E66.01 SEVERE OBESITY: ICD-10-CM

## 2022-06-27 PROCEDURE — 99213 OFFICE O/P EST LOW 20 MIN: CPT | Mod: PBBFAC | Performed by: INTERNAL MEDICINE

## 2022-06-27 PROCEDURE — 99999 PR PBB SHADOW E&M-EST. PATIENT-LVL III: CPT | Mod: PBBFAC,,, | Performed by: INTERNAL MEDICINE

## 2022-06-27 PROCEDURE — 99214 PR OFFICE/OUTPT VISIT, EST, LEVL IV, 30-39 MIN: ICD-10-PCS | Mod: S$PBB,,, | Performed by: INTERNAL MEDICINE

## 2022-06-27 PROCEDURE — 99999 PR PBB SHADOW E&M-EST. PATIENT-LVL III: ICD-10-PCS | Mod: PBBFAC,,, | Performed by: INTERNAL MEDICINE

## 2022-06-27 PROCEDURE — 99214 OFFICE O/P EST MOD 30 MIN: CPT | Mod: S$PBB,,, | Performed by: INTERNAL MEDICINE

## 2022-06-27 NOTE — PROGRESS NOTES
[facility]    Chief Complaint  Chief Complaint   Patient presents with    Coronary Artery Disease       HPI:    Has done well with medical therapy.  More active.  Not having to take any sublingual nitroglycerin.  Still having issues with diabetes control.    Medications  Current Outpatient Medications   Medication Sig Dispense Refill    acetaminophen (TYLENOL) 500 MG tablet Take 2 tablets (1,000 mg total) by mouth every 8 (eight) hours as needed for Pain.  0    aspirin (ECOTRIN) 81 MG EC tablet Take 1 tablet (81 mg total) by mouth once daily. 30 tablet 0    blood sugar diagnostic Strp 1 each by Misc.(Non-Drug; Combo Route) route 4 (four) times daily. 200 each 0    blood-glucose meter Misc Follow package directions (Patient taking differently: Follow package directions) 1 each 0    blood-glucose meter,continuous (DEXCOM G6 ) Misc 1 each by Misc.(Non-Drug; Combo Route) route continuous prn. 1 each PRN    blood-glucose sensor (DEXCOM G6 SENSOR) Nicole 3 each by Misc.(Non-Drug; Combo Route) route continuous prn. Change every 10 days. 3 each PRN    blood-glucose transmitter (DEXCOM G6 TRANSMITTER) Nicole 1 each by Misc.(Non-Drug; Combo Route) route continuous prn. 1 each PRN    cloNIDine (CATAPRES) 0.1 MG tablet Take 1 tablet (0.1 mg total) by mouth 2 (two) times daily. (Patient taking differently: Take 0.1 mg by mouth Daily.) 180 tablet 0    clopidogreL (PLAVIX) 75 mg tablet Take 1 tablet (75 mg total) by mouth once daily. 30 tablet 11    evolocumab (REPATHA SURECLICK) 140 mg/mL PnIj Inject 1 mL (140 mg total) into the skin every 14 (fourteen) days. 8 each 3    furosemide (LASIX) 40 MG tablet Take 1 tablet (40 mg total) by mouth once daily. 30 tablet 11    HYDROcodone-acetaminophen (NORCO)  mg per tablet Take 1 tablet by mouth every 8 (eight) hours as needed for Pain. 90 tablet 0    insulin aspart U-100 (NOVOLOG) 100 unit/mL (3 mL) InPn pen Inject 12 Units into the skin 3 (three)  "times daily with meals. Plus correction scale. Max TDD 40units. 15 mL 3    insulin detemir U-100 (LEVEMIR FLEXTOUCH U-100 INSULN) 100 unit/mL (3 mL) InPn pen Inject 20 Units into the skin 2 (two) times daily. 15 mL 3    ipratropium-albuteroL (COMBIVENT)  mcg/actuation inhaler Inhale 1 puff into the lungs by mouth every 6 (six) hours. 4 g 0    lancets (TRUEPLUS LANCETS) 30 gauge Misc Inject 100 lancets into the skin once daily at 6am. 200 each 2    lancets 33 gauge Misc Use 4 (four) times daily. 200 each 0    losartan (COZAAR) 50 MG tablet Take 1 tablet (50 mg total) by mouth once daily. 90 tablet 0    methocarbamoL (ROBAXIN) 500 MG Tab Take 1 tablet (500 mg total) by mouth 3 (three) times daily as needed (muscle spasm). 90 tablet 0    metoclopramide HCl (REGLAN) 5 MG tablet Take 1 tablet (5 mg total) by mouth 4 (four) times daily as needed (nausea, prevention). 30 tablet 3    metoprolol succinate (TOPROL-XL) 50 MG 24 hr tablet Take 1 tablet (50 mg total) by mouth once daily. 90 tablet 0    NIFEdipine (PROCARDIA-XL) 30 MG (OSM) 24 hr tablet Take 1 tablet (30 mg total) by mouth 2 (two) times a day. 180 tablet 0    nitroGLYCERIN (NITROSTAT) 0.4 MG SL tablet Place 1 tablet (0.4 mg total) under the tongue every 5 (five) minutes as needed for Chest pain. 25 tablet 0    pantoprazole (PROTONIX) 40 MG tablet Take 1 tablet (40 mg total) by mouth once daily. 30 tablet 0    pen needle, diabetic (BD ULTRA-FINE HIEN PEN NEEDLE) 32 gauge x 5/32" Ndle 1 each by Misc.(Non-Drug; Combo Route) route 4 (four) times daily. 400 each 1    pregabalin (LYRICA) 150 MG capsule Take 1 capsule (150 mg total) by mouth 3 (three) times daily. 90 capsule 2    senna-docusate 8.6-50 mg (PERICOLACE) 8.6-50 mg per tablet Take 1 tablet by mouth 2 (two) times daily as needed for Constipation. 60 tablet 0    sertraline (ZOLOFT) 100 MG tablet Take 1 tablet (100 mg total) by mouth once daily. 90 tablet 3    triamcinolone acetonide 0.1% " (KENALOG) 0.1 % cream Apply to affected areas of body twice daily as needed rash. Do not use on face, underarms, or groin. 454 g 0     No current facility-administered medications for this visit.        History  Past Medical History:   Diagnosis Date    Arthritis     Back pain     Cancer     ovarian    Depression     Diabetes mellitus     Fibromyalgia     Hyperlipidemia     Hypertension     Lupus     Stroke     slight left sided weakness     Past Surgical History:   Procedure Laterality Date    APPENDECTOMY       SECTION      CORONARY ANGIOGRAPHY N/A 2022    Procedure: ANGIOGRAM, CORONARY ARTERY;  Surgeon: Jose L Méndez MD;  Location: [facility] CATH LAB;  Service: Cardiology;  Laterality: N/A;    HYSTERECTOMY      INJECTION OF ANESTHETIC AGENT AROUND NERVE Bilateral 2021    Procedure: BLOCK, NERVE, SYMPATHIC;  Surgeon: Holden Pereira MD;  Location: [facility] PAIN MGT;  Service: Pain Management;  Laterality: Bilateral;    INJECTION OF ANESTHETIC AGENT AROUND NERVE N/A 2021    Procedure: BLOCK, NERVE, SYMPATHETIC  need consent;  Surgeon: Holden Pereira MD;  Location: [facility] PAIN MGT;  Service: Pain Management;  Laterality: N/A;    SC EVAL,SWALLOW FUNCTION,CINE/VIDEO RECORD  2021         TONSILLECTOMY       Social History     Socioeconomic History    Marital status:    Tobacco Use    Smoking status: Former Smoker     Quit date: 2020     Years since quittin.6    Smokeless tobacco: Never Used   Substance and Sexual Activity    Alcohol use: No    Drug use: No     Social Determinants of Health     Financial Resource Strain: Low Risk     Difficulty of Paying Living Expenses: Not hard at all   Food Insecurity: No Food Insecurity    Worried About Running Out of Food in the Last Year: Never true    Ran Out of Food in the Last Year: Never true   Transportation Needs: No Transportation Needs    Lack of Transportation (Medical): No    Lack of Transportation  (Non-Medical): No   Physical Activity: Insufficiently Active    Days of Exercise per Week: 3 days    Minutes of Exercise per Session: 20 min   Stress: No Stress Concern Present    Feeling of Stress : Not at all   Social Connections: Socially Isolated    Frequency of Communication with Friends and Family: More than three times a week    Frequency of Social Gatherings with Friends and Family: Never    Attends Christian Services: Never    Active Member of Clubs or Organizations: No    Attends Club or Organization Meetings: Never    Marital Status:    Housing Stability: Low Risk     Unable to Pay for Housing in the Last Year: No    Number of Places Lived in the Last Year: 1    Unstable Housing in the Last Year: No     Family History   Problem Relation Age of Onset    COPD Mother     Lupus Mother     Hernia Mother     Uterine cancer Mother         vs cervical cancer    Ovarian cancer Mother     Diabetes Father     Coronary artery disease Father     Colon cancer Maternal Grandmother         in her 50's        Allergies  Review of patient's allergies indicates:   Allergen Reactions    Bleach (sodium hypochlorite) Shortness Of Breath    Nitrofurantoin macrocrystalline Anaphylaxis    Lipitor [atorvastatin] Diarrhea and Rash    Nsaids (non-steroidal anti-inflammatory drug)     Pcn [penicillins]     Toradol [ketorolac]        Review of Systems   Review of Systems   Constitutional: Negative for chills, fever and malaise/fatigue.   HENT: Negative for nosebleeds.    Eyes: Negative for blurred vision, double vision, vision loss in left eye and vision loss in right eye.   Cardiovascular: Negative for chest pain, claudication, dyspnea on exertion, irregular heartbeat, leg swelling, near-syncope, orthopnea, palpitations, paroxysmal nocturnal dyspnea and syncope.   Respiratory: Negative for cough, hemoptysis, shortness of breath and wheezing.    Endocrine: Negative for cold intolerance and heat  intolerance.   Hematologic/Lymphatic: Negative for bleeding problem. Does not bruise/bleed easily.   Skin: Negative for color change.   Musculoskeletal: Negative for falls, muscle weakness and myalgias.   Gastrointestinal: Negative for abdominal pain, heartburn, hematemesis, hematochezia, hemorrhoids, jaundice, melena, nausea and vomiting.   Genitourinary: Negative for dysuria, hematuria and nocturia.   Neurological: Negative for dizziness, focal weakness, headaches, light-headedness, loss of balance, numbness, vertigo and weakness.   Psychiatric/Behavioral: Negative for altered mental status, depression and memory loss. The patient is not nervous/anxious.    Allergic/Immunologic: Negative for hives and persistent infections.       Physical Exam  Vitals:    06/27/22 1506   BP: 126/76   Pulse: 78     Wt Readings from Last 1 Encounters:   06/27/22 83.9 kg (185 lb)     Physical Exam  Vitals and nursing note reviewed.   Constitutional:       General: She is not in acute distress.     Appearance: She is obese. She is not toxic-appearing or diaphoretic.   HENT:      Head: Normocephalic and atraumatic.      Mouth/Throat:      Lips: Pink.      Mouth: Mucous membranes are moist.   Eyes:      General: No scleral icterus.     Conjunctiva/sclera: Conjunctivae normal.   Neck:      Thyroid: No thyromegaly.      Vascular: No carotid bruit, hepatojugular reflux or JVD.      Trachea: Trachea normal.   Cardiovascular:      Rate and Rhythm: Normal rate and regular rhythm.  No extrasystoles are present.     Chest Wall: PMI is not displaced.      Pulses:           Carotid pulses are 2+ on the right side and 2+ on the left side.       Radial pulses are 2+ on the right side and 2+ on the left side.      Heart sounds: S1 normal and S2 normal. No murmur heard.    No friction rub. No S3 or S4 sounds.   Pulmonary:      Effort: Pulmonary effort is normal. No accessory muscle usage or respiratory distress.      Breath sounds: Normal breath  sounds and air entry. No decreased breath sounds, wheezing, rhonchi or rales.   Abdominal:      General: Bowel sounds are normal. There is no distension or abdominal bruit.      Palpations: Abdomen is soft. There is no hepatomegaly, splenomegaly or pulsatile mass.      Tenderness: There is no abdominal tenderness.   Musculoskeletal:         General: No tenderness or deformity.      Right lower leg: No edema.      Left lower leg: No edema.   Skin:     General: Skin is warm and dry.      Capillary Refill: Capillary refill takes less than 2 seconds.      Coloration: Skin is not cyanotic or pale.      Nails: There is no clubbing.   Neurological:      General: No focal deficit present.      Mental Status: She is alert and oriented to person, place, and time.   Psychiatric:         Attention and Perception: Attention normal.         Mood and Affect: Mood normal.         Speech: Speech normal.         Behavior: Behavior normal. Behavior is cooperative.         Labs  No results displayed because visit has over 200 results.      Lab Visit on 04/14/2022   Component Date Value Ref Range Status    Protein, Urine Random 04/14/2022 33 (A) 0 - 15 mg/dL Final    Creatinine, Urine 04/14/2022 89.0  15.0 - 325.0 mg/dL Final    Prot/Creat Ratio, Urine 04/14/2022 0.37 (A) 0.00 - 0.20 Final    Specimen UA 04/14/2022 Urine, Clean Catch   Final    Color, UA 04/14/2022 Yellow  Yellow, Straw, Aye Final    Appearance, UA 04/14/2022 Hazy (A) Clear Final    pH, UA 04/14/2022 6.0  5.0 - 8.0 Final    Specific Gravity, UA 04/14/2022 1.020  1.005 - 1.030 Final    Protein, UA 04/14/2022 Trace (A) Negative Final    Comment: Recommend a 24 hour urine protein or a urine   protein/creatinine ratio if globulin induced proteinuria is  clinically suspected.      Glucose, UA 04/14/2022 3+ (A) Negative Final    Ketones, UA 04/14/2022 Negative  Negative Final    Bilirubin (UA) 04/14/2022 Negative  Negative Final    Occult Blood UA 04/14/2022 3+  (A) Negative Final    Nitrite, UA 04/14/2022 Negative  Negative Final    Urobilinogen, UA 04/14/2022 Negative  <2.0 EU/dL Final    Leukocytes, UA 04/14/2022 Trace (A) Negative Final    RBC, UA 04/14/2022 60 (A) 0 - 4 /hpf Final    WBC, UA 04/14/2022 35 (A) 0 - 5 /hpf Final    WBC Clumps, UA 04/14/2022 Few (A) None-Rare Final    Bacteria 04/14/2022 Moderate (A) None-Occ /hpf Final    Yeast, UA 04/14/2022 None  None Final    Squam Epithel, UA 04/14/2022 12  /hpf Final    Microscopic Comment 04/14/2022 SEE COMMENT   Final    Comment: Other formed elements not mentioned in the report are not   present in the microscopic examination.      Lab Visit on 04/14/2022   Component Date Value Ref Range Status    CRP 04/14/2022 9.0 (A) 0.0 - 8.2 mg/L Final    STEVE Screen 04/14/2022 Negative <1:80  Negative <1:80 Final    STEVE test was performed by Immunofluorescence on HEP2 cells.       Rheumatoid Factor 04/14/2022 <13.0  0.0 - 15.0 IU/mL Final    Anti-SSB Antibody 04/14/2022 0.08  0.00 - 0.99 Ratio Final    Anti-SSB Interpretation 04/14/2022 Negative  Negative Final    WBC 04/14/2022 14.42 (A) 3.90 - 12.70 K/uL Final    RBC 04/14/2022 4.21  4.00 - 5.40 M/uL Final    Hemoglobin 04/14/2022 12.1  12.0 - 16.0 g/dL Final    Hematocrit 04/14/2022 36.7 (A) 37.0 - 48.5 % Final    MCV 04/14/2022 87  82 - 98 fL Final    MCH 04/14/2022 28.7  27.0 - 31.0 pg Final    MCHC 04/14/2022 33.0  32.0 - 36.0 g/dL Final    RDW 04/14/2022 14.0  11.5 - 14.5 % Final    Platelets 04/14/2022 322  150 - 450 K/uL Final    MPV 04/14/2022 11.5  9.2 - 12.9 fL Final    Immature Granulocytes 04/14/2022 0.3  0.0 - 0.5 % Final    Gran # (ANC) 04/14/2022 8.7 (A) 1.8 - 7.7 K/uL Final    Immature Grans (Abs) 04/14/2022 0.05 (A) 0.00 - 0.04 K/uL Final    Comment: Mild elevation in immature granulocytes is non specific and   can be seen in a variety of conditions including stress response,   acute inflammation, trauma and pregnancy.  Correlation with other   laboratory and clinical findings is essential.      Lymph # 04/14/2022 4.2  1.0 - 4.8 K/uL Final    Mono # 04/14/2022 0.8  0.3 - 1.0 K/uL Final    Eos # 04/14/2022 0.6 (A) 0.0 - 0.5 K/uL Final    Baso # 04/14/2022 0.08  0.00 - 0.20 K/uL Final    nRBC 04/14/2022 0  0 /100 WBC Final    Gran % 04/14/2022 60.4  38.0 - 73.0 % Final    Lymph % 04/14/2022 29.0  18.0 - 48.0 % Final    Mono % 04/14/2022 5.8  4.0 - 15.0 % Final    Eosinophil % 04/14/2022 3.9  0.0 - 8.0 % Final    Basophil % 04/14/2022 0.6  0.0 - 1.9 % Final    Differential Method 04/14/2022 Automated   Final    Sodium 04/14/2022 136  136 - 145 mmol/L Final    Potassium 04/14/2022 4.2  3.5 - 5.1 mmol/L Final    Chloride 04/14/2022 99  95 - 110 mmol/L Final    CO2 04/14/2022 21 (A) 23 - 29 mmol/L Final    Glucose 04/14/2022 523 (A) 70 - 110 mg/dL Final    Comment: GLU   critical result(s) called and verbal readback obtained from   Leigh Isbell RN by [facility] 04/14/2022 13:18      BUN 04/14/2022 29 (A) 8 - 23 mg/dL Final    Creatinine 04/14/2022 1.6 (A) 0.5 - 1.4 mg/dL Final    Calcium 04/14/2022 9.6  8.7 - 10.5 mg/dL Final    Total Protein 04/14/2022 7.9  6.0 - 8.4 g/dL Final    Albumin 04/14/2022 3.9  3.5 - 5.2 g/dL Final    Total Bilirubin 04/14/2022 0.2  0.1 - 1.0 mg/dL Final    Comment: For infants and newborns, interpretation of results should be based  on gestational age, weight and in agreement with clinical  observations.    Premature Infant recommended reference ranges:  Up to 24 hours.............<8.0 mg/dL  Up to 48 hours............<12.0 mg/dL  3-5 days..................<15.0 mg/dL  6-29 days.................<15.0 mg/dL      Alkaline Phosphatase 04/14/2022 110  55 - 135 U/L Final    AST 04/14/2022 12  10 - 40 U/L Final    ALT 04/14/2022 8 (A) 10 - 44 U/L Final    Anion Gap 04/14/2022 16  8 - 16 mmol/L Final    eGFR if  04/14/2022 36 (A) >60 mL/min/1.73 m^2 Final    eGFR if non   04/14/2022 31 (A) >60 mL/min/1.73 m^2 Final    Comment: Calculation used to obtain the estimated glomerular filtration  rate (eGFR) is the CKD-EPI equation.       CCP Antibodies 04/14/2022 <0.5  <5.0 U/mL Final    Complement (C-4) 04/14/2022 45 (A) 11 - 44 mg/dL Final    Complement (C-3) 04/14/2022 162  50 - 180 mg/dL Final    Anti-Histone Antibody 04/14/2022 0.3  0.0 - 0.9 Units Final    Comment: INTERPRETIVE INFORMATION: Histone Ab, IgG  0.9 Units or less ............ Negative  1.0 - 1.5 Units .............. Weak Positive  1.6 - 2.5 Units .............. Moderate Positive  2.6 Units or greater ......... Strong Positive  Performed By: [facility]  [address] [place] [id number]  : Jodi Vu MD      Sed Rate 04/14/2022 55 (A) 0 - 20 mm/Hr Final    Hep B S Ab 04/14/2022 Negative   Final    Individual is considered not immune to HBV infection.    Vit D, 25-Hydroxy 04/14/2022 34  30 - 96 ng/mL Final    Comment: Vitamin D deficiency.........<10 ng/mL                              Vitamin D insufficiency......10-29 ng/mL       Vitamin D sufficiency........> or equal to 30 ng/mL  Vitamin D toxicity............>100 ng/mL      TSH 04/14/2022 2.084  0.400 - 4.000 uIU/mL Final    Uric Acid 04/14/2022 6.4 (A) 2.4 - 5.7 mg/dL Final    Hepatitis C Ab 04/14/2022 Negative  Negative Final    Hepatitis B Surface Ag 04/14/2022 Negative  Negative Final    CPK 04/14/2022 40  20 - 180 U/L Final    Scleroderma SCL- 04/14/2022 7  <20 UNITS Final    Comment: Interpretation: Negative    Test performed at [facility],  [address], [place]  [address]     [phone]  Jules Polo MD  - Medical Director      HIV 1/2 Ag/Ab 04/14/2022 Negative  Negative Final    IgG 1 04/14/2022 500  382 - 929 mg/dL Final    IgG 2 04/14/2022 294  242 - 700 mg/dL Final    IgG 3 04/14/2022 31  22 - 176 mg/dL Final    IgG 4 04/14/2022 88 (A) 4 - 86 mg/dL Final     Comment: Test performed at [facility],  [address], [place]  [address]     [phone]  Jules Polo MD  - Medical Director      IgG 04/14/2022 997  650 - 1600 mg/dL Final    IgG Cord Blood Reference Range: 650-1600 mg/dL.    IgA 04/14/2022 443 (A) 40 - 350 mg/dL Final    IgA Cord Blood Reference Range: <5 mg/dL.    IgM 04/14/2022 116  50 - 300 mg/dL Final    IgM Cord Blood Reference Range: <25 mg/dL.    Protein, Serum 04/14/2022 7.7  6.0 - 8.4 g/dL Final    Comment: Serum protein electrophoresis and immunofixation results should be   interpreted in clinical context in that some therapeutic agents can   result   in false positive results (example, daratumumab). Correlation with   the   patient s therapeutic regimen is required.      Albumin 04/14/2022 4.37  3.35 - 5.55 g/dL Final    Alpha-1 04/14/2022 0.36  0.17 - 0.41 g/dL Final    Alpha-2 04/14/2022 1.01 (A) 0.43 - 0.99 g/dL Final    Beta 04/14/2022 1.08  0.50 - 1.10 g/dL Final    Gamma 04/14/2022 0.88  0.67 - 1.58 g/dL Final    Immunofix Interp. 04/14/2022 SEE COMMENT   Final    Comment: Serum protein electrophoresis and immunofixation results should be   interpreted in clinical context in that some therapeutic agents can   result   in false positive results (example, daratumumab). Correlation with   the   patient s therapeutic regimen is required.  See pathologist's interpretation.      PT Lupus Anticoagulant 04/14/2022 12.9  12.0 - 15.5 sec Final    PTT Lupus Anticoagulant 04/14/2022 39  32 - 48 sec Final    Thrombin Time 04/14/2022 Not Applicable  14.7 - 19.5 sec Final    Reptilase Time 04/14/2022 Not Applicable  <=21.9 sec Final    PTT Heparin Neutralized 04/14/2022 Not Applicable  32 - 48 sec Final    PTT-LA Mix 04/14/2022 Not Applicable  32 - 48 sec Final    PLATELET NEUTRALIZATION APTT 04/14/2022 Not Applicable  Negative Final    DRVVT Screen 04/14/2022 37  33 - 44 sec Final    dRVVT Incubated 1:1 Mix  04/14/2022 Not Applicable  33 - 44 sec Final    dRVVT Confirm 04/14/2022 Not Applicable  Negative ratio Final    Hex Phosph Neut Test 04/14/2022 Not Applicable  Negative Final    Lupus Anticoagulant Interpretation 04/14/2022 See Note   Final    Comment: Lupus anticoagulant not detected.  The phospholipid-dependent screening tests (PTT, DRVVT) are   not prolonged.  Lupus anticoagulant antibodies are heterogeneous and   antibody titers fluctuate over time. Laboratory tests used   to identify lupus anticoagulants demonstrate variable   sensitivity. If there is strong clinical suspicion for   antiphospholipid antibody syndrome (APS), consider testing   for cardiolipin and beta-2 glycoprotein 1 antibodies (IgG   and IgM) if this testing has not already been performed.  Performed by [facility],                              [address] [place] [id number] [phone]                    [email or web address] Jodi Vu MD - Lab. Director      APA Isotype IgG 04/14/2022 <9.40  0.00 - 14.99 GPL Final    Interpretation: Absent    APA Isotype IgM 04/14/2022 18.30 (A) 0.00 - 12.49 MPL Final    Comment: Interpretation: Inconclusive    Test performed at [facility],  [address] [place]  [address] [phone]  Jules Polo MD  - Medical Director      Beta-2 Glyco 1 IgG 04/14/2022 <9  <=20 SGU Final    Beta-2 Glyco 1 IgM 04/14/2022 <9  <=20 [facility]U Final    Beta-2 Glyco 1 IgA 04/14/2022 <9  <=20 TROY Final    Comment: Test performed at [facility],  [address] [place]  [address] [phone]  Jules Polo MD  - Medical Director      Cytoplasmic Neutrophilic Ab 04/14/2022 <1:20  <1:20 Titer Final    Comment: Test performed at [facility],  [address] [place]  [address] [phone]  Jules Polo MD  - Medical Director      Perinuclear (P-ANCA) 04/14/2022 <1:20  <1:20 Titer Final    Cryoglobulin, Qual 04/14/2022 Absent  Absent  Final    Comment: Test performed at [facility],  [address], [place]  [address]     [phone]  Jules Polo MD  - Medical Director      Hemoglobin A1C 04/14/2022 11.6 (A) 4.0 - 5.6 % Final    Comment: ADA Screening Guidelines:  5.7-6.4%  Consistent with prediabetes  >or=6.5%  Consistent with diabetes    High levels of fetal hemoglobin interfere with the HbA1C  assay. Heterozygous hemoglobin variants (HbS, HgC, etc)do  not significantly interfere with this assay.   However, presence of multiple variants may affect accuracy.      Estimated Avg Glucose 04/14/2022 286 (A) 68 - 131 mg/dL Final    Pathologist Interpretation BRI 04/14/2022 REVIEWED   Final    Comment:   Electronically reviewed and signed by:  Tangela Reyes MD  Signed on 04/20/22 at 15:22  No monoclonal peaks identified.      Pathologist Interpretation SPE 04/14/2022 REVIEWED   Final    Comment:   Electronically reviewed and signed by:  Tangela Reyes MD  Signed on 04/20/22 at 15:22  Normal total protein.  No discrete paraprotein bands identified.     Office Visit on 02/14/2022   Component Date Value Ref Range Status    DRUGS EXPECTED 02/14/2022 See Interp   Final    PAIN MANAGEMENT DRUG PANEL INTERP 02/14/2022 See Note   Final    Comment: ____________________________________________________________  ____  NO DRUGS PROVIDED  ____________________________________________________________  ____  Please call [employer] [facility] at   [phone] for Medical Director interpretation if   applicable. Alternatively, please consider the PAIN HYB U   ([id number]) which does not require medication information or   provide compliance interpretation.  ____________________________________________________________  ____  INTERPRETIVE INFORMATION: Targeted drug profile Interp  Interpretation depends on accuracy and completeness of   patient medication information submitted by client.      CODEINE 02/14/2022 Not  Detected   Final    MORPHINE 02/14/2022 Not Detected   Final    6-ACETYLMORPHINE 02/14/2022 Not Detected   Final    OXYCODONE 02/14/2022 Not Detected   Final    NOROYXCODONE 02/14/2022 Not Detected   Final    OXYMORPHONE 02/14/2022 Not Detected   Final    NOROXYMORPHONE 02/14/2022 Not Detected   Final    HYDROCODONE 02/14/2022 Present   Final    NORHYDROCODONE 02/14/2022 Present   Final    HYDROMORPHONE 02/14/2022 Present   Final    BUPRENORPHINE 02/14/2022 Not Detected   Final    NORUBPRENORPHINE 02/14/2022 Not Detected   Final    FENTANYL 02/14/2022 Not Detected   Final    NORFENTANYL 02/14/2022 Not Detected   Final    MEPERIDINE METABOLITE 02/14/2022 Not Detected   Final    TAPENTADOL 02/14/2022 Not Detected   Final    TAPENTADOL-O-SULF 02/14/2022 Not Detected   Final    METHADONE 02/14/2022 Not Detected   Final    TRAMADOL 02/14/2022 Not Detected   Final    AMPHETAMINE 02/14/2022 Not Detected   Final    METHAMPHETAMINE 02/14/2022 Not Detected   Final    MDMA- ECSTASY 02/14/2022 Not Detected   Final    MDA 02/14/2022 Not Detected   Final    MDEA- Mary 02/14/2022 Not Detected   Final    METHYLPHENIDATE 02/14/2022 Not Detected   Final    PHENTERMINE 02/14/2022 Not Detected   Final    BENZOYLECGONINE 02/14/2022 Not Detected   Final    ALPRAZOLAM 02/14/2022 Not Detected   Final    ALPHA-OH-ALPRAZOLAM 02/14/2022 Not Detected   Final    CLONAZEPAM 02/14/2022 Not Detected   Final    7-AMINOCLONAZEPAM 02/14/2022 Not Detected   Final    DIAZEPAM 02/14/2022 Not Detected   Final    NORDIAZEPAM 02/14/2022 Not Detected   Final    OXAZEPAM 02/14/2022 Not Detected   Final    TEMAZEPAM 02/14/2022 Not Detected   Final    Lorazepam 02/14/2022 Not Detected   Final    MIDAZOLAM 02/14/2022 Not Detected   Final    ZOLPIDEM 02/14/2022 Not Detected   Final    BARBITURATES 02/14/2022 Not Detected   Final    Creatinine, Urine 02/14/2022 62.7  20.0 - 400.0 mg/dL Final    ETHYL GLUCURONIDE 02/14/2022  Not Detected   Final    MARIJUANA METABOLITE 02/14/2022 Not Detected   Final    PCP 02/14/2022 Not Detected   Final    CARISOPRODOL 02/14/2022 Not Detected   Final    Comment: The carisoprodol immunoassay has cross-reactivity to   carisoprodol and meprobamate.      Naloxone 02/14/2022 Not Detected   Final    Gabapentin 02/14/2022 Not Detected   Final    Pregabalin 02/14/2022 Present   Final    Alpha-OH-Midazolam 02/14/2022 Not Detected   Final    Zolpidem Metabolite 02/14/2022 Not Detected   Final    PAIN MANAGEMENT DRUG PANEL 02/14/2022 See Below   Final    Comment: Methodology: Qualitative Enzyme Immunoassay and Qualitative   Liquid Chromatography-Tandem Mass Spectrometry,   Quantitative Spectrophotometry  The absence of expected drug(s) and/or drug metabolite(s)   may indicate non-compliance, inappropriate timing of   specimen collection relative to drug administration, poor   drug absorption, diluted/adulterated urine, or limitations   of testing. The concentration must be greater than or equal   to the cutoff to be reported as present.  If specific drug   concentrations are required, contact the laboratory within   two weeks of specimen collection to request quantification   by a second analytical technique. Interpretive questions   should be directed to the laboratory.  Results based on immunoassay detection that do not match   clinical expectations should be  interpreted with caution. Confirmatory testing by mass   spectrometry for immunoassay-based results is available, if   ordered within two weeks of specimen collection. Additional   kathrin                           ges apply.  For medical purposes only; not valid for forensic use.  This test was developed and its performance characteristics   determined by [facility]. It has not been cleared or   approved by the US Food and Drug Administration. This test   was performed in a CLIA certified laboratory and is   intended for clinical  purposes.      EER PAIN MGT PAN,HIGH RES/EMIT, IN* 02/14/2022 See Note   Final    Comment: Access [facility] Enhanced Report using the link below:    -Direct access:   [email or web address]  Performed By: [facility]  [address] [place] [id number]  : Jodi Vu MD         Imaging  CT Renal Stone Study ABD Pelvis WO    Result Date: 6/21/2022  EXAMINATION: CT RENAL STONE STUDY ABD PELVIS WO CLINICAL HISTORY: Flank pain, kidney stone suspected; Right lower quadrant pain TECHNIQUE: Low dose axial images, sagittal and coronal reformations were obtained from the lung bases to the pubic symphysis.  Contrast was not administered. COMPARISON: CT abdomen pelvis 07/10/2014 FINDINGS: LUNG BASES: Mitral annular calcifications. HEPATOBILIARY: Unchanged 1.3 cm soft tissue nodule in the gallbladder fossa (02:50); it is difficult to tell whether this arises from the liver or gallbladder wall.  Either way, it is unchanged from 2014 and likely benign.  No new focal hepatic lesions.  Gallbladder is otherwise normal.  No biliary ductal dilatation. SPLEEN: No splenomegaly. PANCREAS: Diffuse fatty atrophy.  No focal masses or ductal dilatation. ADRENALS: No adrenal nodules. KIDNEYS/URETERS: No hydronephrosis or stones.  Unchanged subcentimeter hypodensity in the left upper pole, too small to characterize but likely a cyst. PELVIC ORGANS/BLADDER: Circumferential bladder wall thickening.  Hysterectomy.  No adnexal mass. PERITONEUM / RETROPERITONEUM: No free air or fluid. LYMPH NODES: No lymphadenopathy. VESSELS: Scattered atherosclerosis of the abdominal aorta and its major branches. GI TRACT: Small hiatal hernia.  Mildly dilated fecalized loop of small bowel in the left upper quadrant (601:59).  No abrupt transition point.  No large bowel distention.  No bowel wall thickening.  Prior appendectomy.  Moderate stool burden throughout the colon. BONES AND SOFT TISSUES:  Postoperative changes in the midline abdominal wall.  There are subcutaneous nodules in the lower abdominal wall and gluteal regions bilaterally, presumably injection related. Mild degenerative changes.  No acute fractures or focal osseous lesions.     Mildly dilated fecalized loop of small bowel in the left upper quadrant with no abrupt transition point, likely localized ileus. Moderate colonic stool burden. No nephrolithiasis or hydronephrosis. Circumferential bladder wall thickening, which could be secondary to cystitis or outlet obstruction.  Correlation with urinalysis is recommended. Electronically signed by: Fahad Ricks Date:    06/21/2022 Time:    09:49      Assessment  1. Atherosclerosis of native coronary artery of native heart with stable angina pectoris  Stable on medical therapy.  Small-vessel disease not favorable for revascularization    2. Essential hypertension  Controlled    3. Hypercholesterolemia  Controlled    4. Type 2 diabetes mellitus with stage 3a chronic kidney disease, with long-term current use of insulin  Working on it    5. Severe obesity  Unchanged      Plan and Discussion    Continue current guideline directed medical therapy.    The 10-year ASCVD risk score (Bear DC Jr., et al., 2013) is: 38.4%    Values used to calculate the score:      Age: 77 years      Sex: Female      Is Non- : No      Diabetic: Yes      Tobacco smoker: No      Systolic Blood Pressure: 126 mmHg      Is BP treated: Yes      HDL Cholesterol: 38 mg/dL      Total Cholesterol: 186 mg/dL     Follow Up  Follow up in about 3 months (around 9/27/2022).      Jose L Méndez MD

## 2022-06-28 ENCOUNTER — SPECIALTY PHARMACY (OUTPATIENT)
Dept: PHARMACY | Facility: CLINIC | Age: 77
End: 2022-06-28
Payer: MEDICARE

## 2022-06-28 ENCOUNTER — PATIENT OUTREACH (OUTPATIENT)
Dept: ADMINISTRATIVE | Facility: OTHER | Age: 77
End: 2022-06-28
Payer: MEDICARE

## 2022-06-28 NOTE — TELEPHONE ENCOUNTER
PA approved for Repatha from 6/28/22 to 6/28/23.   PA Case: [id number]  $4 copay     Forwarding to .

## 2022-06-28 NOTE — TELEPHONE ENCOUNTER
Oma, this is Karon Zarate with [facility].  We are working on your prescription that your doctor has sent us. We will be working with your insurance to get this approved for you. We will be calling you along the way with updates on your medication.  If you have any questions, you can reach us at [phone].    Welcome call outcome: No answer/Unable to leave voicemail

## 2022-06-29 NOTE — PROGRESS NOTES
Outpatient Care Management  Plan of Care Follow Up Visit    Patient: Indira Waters  MRN: [id number]  Date of Service: 06/20/2022  Completed by: Eden Plascencia RN  Referral Date: 05/09/2022  Program: Case Management (High Risk)    Reason for Visit   Patient presents with    OPCM Chart Review    OPCM RN First Follow-Up Attempt    OPCM RN Second Follow-Up Attempt     6/27 message sent v ia pt portal       Brief Summary:   7/6 no response from pt case closed  7/5  Message to dr. jimenez advising of case closure due to no contact      6/27    Received message from pt states they are busy and will contact me later today for follow up      ally lambw attempted to contact to assist with medicaid application for [place]  [facility] also attempting to contact for resources for repatha rx  Pt to cardiology no significant changes    Send message to pcp to possibly simplify insulin regiment and maybe add trulicity    Next steps from previous encounter      Interventions  Chart reviewed  Reviewed upcoming appt schedule    Assess/review short term goals met       Next steps  Review educational information on        Patient Summary     Involvement of Care:  Do I have permission to speak with other family members about your care?       Patient Reported Labs & Vitals:  1.  Any Patient Reported Labs & Vitals?     2.  Patient Reported Blood Pressure:     3.  Patient Reported Pulse:     4.  Patient Reported Weight (Kg):     5.  Patient Reported Blood Glucose (mg/dl):       Medical and social history was reviewed with patient and/or caregiver.     Clinical Assessment     Reviewed and provided basic information on available community resources for mental health, transportation, wellness resources, and palliative care programs with patient and/or caregiver.     Complex Care Plan     Care plan was discussed and completed today with input from patient and/or caregiver.    Patient Instructions     Instructions were provided via the  [facility] patient resources and are available for the patient to view on the patient portal.      Todays OPCM Self-Management Care Plan was developed with the patients/caregivers input and was based on identified barriers from todays assessment.  Goals were written today with the patient/caregiver and the patient has agreed to work towards these goals to improve his/her overall well-being. Patient verbalized understanding of the care plan, goals, and all of today's instructions. Encouraged patient/caregiver to communicate with his/her physician and health care team about health conditions and the treatment plan.  Provided my contact information today and encouraged patient/caregiver to call me with any questions as needed.

## 2022-06-29 NOTE — TELEPHONE ENCOUNTER
BI: COMPLETE     MEDICARE PLAN: Rx options   Estimated copay: $4  Benefits Stage: initial   In Network: yes   PA approval on file: 6/28/22-6/28/23  LIS level: 2    Pending for initial consult

## 2022-06-30 ENCOUNTER — TELEPHONE (OUTPATIENT)
Dept: PRIMARY CARE CLINIC | Facility: CLINIC | Age: 77
End: 2022-06-30

## 2022-06-30 ENCOUNTER — PATIENT OUTREACH (OUTPATIENT)
Dept: ADMINISTRATIVE | Facility: OTHER | Age: 77
End: 2022-06-30
Payer: MEDICARE

## 2022-06-30 ENCOUNTER — SPECIALTY PHARMACY (OUTPATIENT)
Dept: PHARMACY | Facility: CLINIC | Age: 77
End: 2022-06-30
Payer: MEDICARE

## 2022-06-30 DIAGNOSIS — E78.01 ESSENTIAL FAMILIAL HYPERCHOLESTEROLEMIA: Primary | ICD-10-CM

## 2022-06-30 NOTE — PROGRESS NOTES
Community Health Worker's second attempt to contact this patient regarding Medicaid applicatin.  Unable to contact patient at this time.  Unable to leave a voicemail message and will try again at a later date.

## 2022-06-30 NOTE — TELEPHONE ENCOUNTER
----- Message from Eden Plascencia RN sent at 6/29/2022  9:07 AM CDT -----  This pt was referred to you but has decided to stay with her current PCP Dr Zapata.  Would you still make a recommendation for dm management.  Pt is taking levimere bid and novolog 3 times per day with correction scale. I dont think they are compliant with this and would possibly benefit from a simpler regiment.  I think dr. Zapata would be willing to prescribed as she was open to pt going to [facility] due to her non compliance and complicated hx.     pt has not seen endocrine since 10/2021.  They seem to have a good connection with dr. Zapata.      Please advise    Eden Plascencia RN,[facility]  Outpatient Complex Case Management  [phone]

## 2022-07-01 ENCOUNTER — OFFICE VISIT (OUTPATIENT)
Dept: UROLOGY | Facility: CLINIC | Age: 77
End: 2022-07-01
Payer: MEDICARE

## 2022-07-01 VITALS
SYSTOLIC BLOOD PRESSURE: 161 MMHG | WEIGHT: 184.94 LBS | DIASTOLIC BLOOD PRESSURE: 70 MMHG | HEIGHT: 60 IN | BODY MASS INDEX: 36.31 KG/M2 | HEART RATE: 76 BPM

## 2022-07-01 DIAGNOSIS — N20.0 NEPHROLITHIASIS: ICD-10-CM

## 2022-07-01 DIAGNOSIS — N32.89 BLADDER WALL THICKENING: Primary | ICD-10-CM

## 2022-07-01 PROCEDURE — 99213 OFFICE O/P EST LOW 20 MIN: CPT | Mod: S$GLB,ICN,, | Performed by: NURSE PRACTITIONER

## 2022-07-01 PROCEDURE — 87086 URINE CULTURE/COLONY COUNT: CPT | Performed by: NURSE PRACTITIONER

## 2022-07-01 PROCEDURE — 99213 PR OFFICE/OUTPT VISIT, EST, LEVL III, 20-29 MIN: ICD-10-PCS | Mod: S$GLB,ICN,, | Performed by: NURSE PRACTITIONER

## 2022-07-01 NOTE — PROGRESS NOTES
"Subjective:      Indira Waters is a 77 y.o. female who was referred by Dr. Zapata for evaluation of her nephrolithiasis.    The patient has a long history of stone stones- s/p ESWL many years ago. Reports passing uric acid stones. Most recently she passed 2 small stones in June.     She completed a CT that demonstrated "No hydronephrosis or stones. Unchanged subcentimeter hypodensity in the left upper pole, too small to characterize but likely a cyst. Circumferential bladder wall thickening."    She denies dysuria, frequency and urgency. Reports gross hematuria with passing her small stones. She reports lower right quadrant abdominal pain.     Former smoker. Denies a recent history of recurrent UTIs.    The following portions of the patient's history were reviewed and updated as appropriate: allergies, current medications, past family history, past medical history, past social history, past surgical history and problem list.    Review of Systems  Constitutional: no fever or chills  ENT: no nasal congestion or sore throat  Respiratory: no cough or shortness of breath  Cardiovascular: no chest pain or palpitations  Gastrointestinal: no nausea or vomiting, tolerating diet  Genitourinary: as per HPI  Hematologic/Lymphatic: no easy bruising or lymphadenopathy  Musculoskeletal: no arthralgias or myalgias  Neurological: no seizures or tremors  Behavioral/Psych: no auditory or visual hallucinations     Objective:   Vital Signs:  Vitals:    07/01/22 1321   BP: (!) 161/70   Pulse: 76     Physical Exam   General: alert and oriented, no acute distress  Head: normocephalic, atraumatic  Neck: supple, normal ROM  Respiratory: Symmetric expansion, non-labored breathing  Cardiovascular: regular rate and rhythm  Abdomen: soft, non tender, non distended  Pelvic: deferred  Skin: normal coloration and turgor, no rashes, no suspicious skin lesions noted  Neuro: alert and oriented x3, no gross deficits  Psych: normal judgment and " "insight, normal mood/affect and non-anxious  No CVA tenderness    Lab Review   Urinalysis demonstrates positive for red blood cells (5-10 royce/mL), glucose (500 mg/dL)  Lab Results   Component Value Date    WBC 6.09 05/07/2022    HGB 10.0 (L) 05/07/2022    HCT 31.1 (L) 05/07/2022    HCT 43 09/05/2021    MCV 91 05/07/2022     05/07/2022     Lab Results   Component Value Date    CREATININE 1.2 05/07/2022    BUN 19 05/07/2022       Imaging   (all images personally reviewed; agree with report below)  CT stone study (6/20/22)- "No nephrolithiasis or hydronephrosis. Circumferential bladder wall thickening, which could be secondary to cystitis or outlet obstruction.  Correlation with urinalysis is recommended."    Assessment:     Bladder wall thickening  Nephrolithiasis    Plan:   Indira was seen today for kidnsy stone.    Diagnoses and all orders for this visit:    Bladder wall thickening  -     Cystoscopy; Future    Nephrolithiasis  -     Ambulatory referral/consult to Urology  -     Urine culture    Plan:  --Discussed imaging results- no stones or hydro noted  --Recommend general stone preventive measures, to include increased hydration, low Na diet, and increased citrus intake  --Urine culture today  --Due to smoking history will proceed with cysto for further evaluation of bladder wall thickening noted on CT  "

## 2022-07-02 LAB — BACTERIA UR CULT: NORMAL

## 2022-07-05 ENCOUNTER — TELEPHONE (OUTPATIENT)
Dept: INTERNAL MEDICINE | Facility: CLINIC | Age: 77
End: 2022-07-05
Payer: MEDICARE

## 2022-07-05 ENCOUNTER — PATIENT OUTREACH (OUTPATIENT)
Dept: ADMINISTRATIVE | Facility: OTHER | Age: 77
End: 2022-07-05
Payer: MEDICARE

## 2022-07-05 NOTE — TELEPHONE ENCOUNTER
----- Message from Eden Plascencia RN sent at 7/5/2022  8:31 AM CDT -----  I have made several attempts to follow up with pt and her daughter without success.  I will be closing their case this week if they do not follow up with me.    I did reach out to Dr Marshall and ask her for suggestion on simplifying her insulin regiment for improved compliance.  I see she is scheduled to see endocrine and hope they might do that if the pt attends the appointment.  If not that was one of Dr. Castro recommendations to simplify the regiment for ease of management.    Sincerely,  Eden Plascencia RN,[facility]  Outpatient Complex Case Management  [phone]

## 2022-07-05 NOTE — PROGRESS NOTES
Case closed on this date due to inability to make contact with pt.  OPCM RN notified re: case closure.

## 2022-07-05 NOTE — PROGRESS NOTES
Community Health Worker assistance requested from July Kapadia LCSW to assist this patient with Medicaid application.  Application completed with: n/a   Location of meeting: telephone   Status of application: Informed patients daughter of the medicaid application center number, she decline information regarding medicaid applications centers.   Follow up required: no   Follow up Scheduled for: n/ a

## 2022-07-08 ENCOUNTER — PATIENT MESSAGE (OUTPATIENT)
Dept: PHARMACY | Facility: CLINIC | Age: 77
End: 2022-07-08
Payer: MEDICARE

## 2022-07-11 ENCOUNTER — PES CALL (OUTPATIENT)
Dept: ADMINISTRATIVE | Facility: CLINIC | Age: 77
End: 2022-07-11
Payer: MEDICARE

## 2022-07-11 NOTE — TELEPHONE ENCOUNTER
Spoke with Esha, she stated that pt is refusing to be part of [facility].   Pt received a call stating what to expect with Dr. Marshall's appt.   It was explained to the pt that the goal was to get pt off of her pain meds.   Pt is in pain, has problems with her back and is not able to have surgery.     Explained that the goal is not to stop her pain meds.     Esha scheduled pt with SOFY Lane DNP, explained if she gets another phone call stating this, please explain the goal is not to stop pain meds.

## 2022-07-12 DIAGNOSIS — R11.0 NAUSEA: ICD-10-CM

## 2022-07-12 DIAGNOSIS — G89.4 CHRONIC PAIN SYNDROME: ICD-10-CM

## 2022-07-12 DIAGNOSIS — E08.42 DIABETIC POLYNEUROPATHY ASSOCIATED WITH DIABETES MELLITUS DUE TO UNDERLYING CONDITION: ICD-10-CM

## 2022-07-12 DIAGNOSIS — G89.4 CHRONIC PAIN DISORDER: ICD-10-CM

## 2022-07-12 DIAGNOSIS — I20.89 ANGINA AT REST: ICD-10-CM

## 2022-07-12 RX ORDER — PROMETHAZINE HYDROCHLORIDE 25 MG/1
25 TABLET ORAL EVERY 6 HOURS PRN
Qty: 30 TABLET | Refills: 0 | Status: CANCELLED | OUTPATIENT
Start: 2022-07-12

## 2022-07-12 NOTE — TELEPHONE ENCOUNTER
Specialty Pharmacy - Initial Clinical Assessment    Specialty Medication Orders Linked to Encounter    Flowsheet Row Most Recent Value   Medication #1 evolocumab (REPATHA SURECLICK) 140 mg/mL PnIj (Order#[id number], Rx#[id number])        Patient Diagnosis   E78.01 - Essential familial hypercholesterolemia    Subjective    Indira Waters is a 77 y.o. female, who is followed by the specialty pharmacy service for management and education.    Recent Encounters     Date Type Provider Description    06/30/2022 Specialty Pharmacy Karon Main PharmD Initial Clinical Assessment    06/28/2022 Specialty Pharmacy Karon Main PharmD Referral Authorization    10/21/2021 Specialty Pharmacy Ayesha Reyes, Taniya Initial Clinical Assessment    10/15/2021 Specialty Pharmacy Giuliana Medina PharmD Referral Authorization        Clinical call attempts since last clinical assessment   6/30/2022  5:15 PM - Specialty Pharmacy - Clinical Assessment by Karon Main PharmD     Current Outpatient Medications   Medication Sig    acetaminophen (TYLENOL) 500 MG tablet Take 2 tablets (1,000 mg total) by mouth every 8 (eight) hours as needed for Pain.    aspirin (ECOTRIN) 81 MG EC tablet Take 1 tablet (81 mg total) by mouth once daily.    blood sugar diagnostic Strp 1 each by Misc.(Non-Drug; Combo Route) route 4 (four) times daily.    blood-glucose meter Misc Follow package directions (Patient taking differently: Follow package directions)    blood-glucose meter,continuous (DEXCOM G6 ) Misc 1 each by Misc.(Non-Drug; Combo Route) route continuous prn.    blood-glucose sensor (DEXCOM G6 SENSOR) Nicole 3 each by Misc.(Non-Drug; Combo Route) route continuous prn. Change every 10 days.    blood-glucose transmitter (DEXCOM G6 TRANSMITTER) Nicole 1 each by Misc.(Non-Drug; Combo Route) route continuous prn.    cloNIDine (CATAPRES) 0.1 MG tablet Take 1 tablet (0.1 mg total) by mouth 2 (two) times daily. (Patient  "taking differently: Take 0.1 mg by mouth Daily.)    clopidogreL (PLAVIX) 75 mg tablet Take 1 tablet (75 mg total) by mouth once daily.    evolocumab (REPATHA SURECLICK) 140 mg/mL PnIj Inject 1 mL (140 mg total) into the skin every 14 (fourteen) days.    furosemide (LASIX) 40 MG tablet Take 1 tablet (40 mg total) by mouth once daily.    HYDROcodone-acetaminophen (NORCO)  mg per tablet Take 1 tablet by mouth every 8 (eight) hours as needed for Pain.    insulin aspart U-100 (NOVOLOG) 100 unit/mL (3 mL) InPn pen Inject 12 Units into the skin 3 (three) times daily with meals. Plus correction scale. Max TDD 40units.    insulin detemir U-100 (LEVEMIR FLEXTOUCH U-100 INSULN) 100 unit/mL (3 mL) InPn pen Inject 20 Units into the skin 2 (two) times daily.    ipratropium-albuteroL (COMBIVENT)  mcg/actuation inhaler Inhale 1 puff into the lungs by mouth every 6 (six) hours.    lancets (TRUEPLUS LANCETS) 30 gauge Misc Inject 100 lancets into the skin once daily at 6am.    lancets 33 gauge Misc Use 4 (four) times daily.    losartan (COZAAR) 50 MG tablet Take 1 tablet (50 mg total) by mouth once daily.    methocarbamoL (ROBAXIN) 500 MG Tab Take 1 tablet (500 mg total) by mouth 3 (three) times daily as needed (muscle spasm).    metoclopramide HCl (REGLAN) 5 MG tablet Take 1 tablet (5 mg total) by mouth 4 (four) times daily as needed (nausea, prevention).    metoprolol succinate (TOPROL-XL) 50 MG 24 hr tablet Take 1 tablet (50 mg total) by mouth once daily.    NIFEdipine (PROCARDIA-XL) 30 MG (OSM) 24 hr tablet Take 1 tablet (30 mg total) by mouth 2 (two) times a day.    nitroGLYCERIN (NITROSTAT) 0.4 MG SL tablet Place 1 tablet (0.4 mg total) under the tongue every 5 (five) minutes as needed for Chest pain.    pantoprazole (PROTONIX) 40 MG tablet Take 1 tablet (40 mg total) by mouth once daily.    pen needle, diabetic (BD ULTRA-FINE HIEN PEN NEEDLE) 32 gauge x 5/32" Ndle 1 each by Misc.(Non-Drug; Combo " Route) route 4 (four) times daily.    pregabalin (LYRICA) 150 MG capsule Take 1 capsule (150 mg total) by mouth 3 (three) times daily.    senna-docusate 8.6-50 mg (PERICOLACE) 8.6-50 mg per tablet Take 1 tablet by mouth 2 (two) times daily as needed for Constipation.    sertraline (ZOLOFT) 100 MG tablet Take 1 tablet (100 mg total) by mouth once daily.    triamcinolone acetonide 0.1% (KENALOG) 0.1 % cream Apply to affected areas of body twice daily as needed rash. Do not use on face, underarms, or groin.   Last reviewed on 7/1/2022  1:58 PM by Celsa Ramos NP    Review of patient's allergies indicates:   Allergen Reactions    Bleach (sodium hypochlorite) Shortness Of Breath    Nitrofurantoin macrocrystalline Anaphylaxis    Lipitor [atorvastatin] Diarrhea and Rash    Nsaids (non-steroidal anti-inflammatory drug)     Pcn [penicillins]     Toradol [ketorolac]    Last reviewed on  7/1/2022 1:21 PM by Priyanka Mcgraw    Drug Interactions    Drug interactions evaluated: no  Clinically relevant drug interactions identified: no  Provided the patient with educational material regarding drug interactions: not applicable         Adverse Effects    Unable to perform a full ROS: Yes   Reason: other        Assessment Questions - Documented Responses    Flowsheet Row Most Recent Value   Assessment    Medication Reconciliation completed for patient No   Goals of Therapy Status Discussed (new start)   Status of the patients ability to self-administer: Is Able   All education points have been covered with patient? Yes, supplemental printed education provided   Welcome packet contents reviewed and discussed with patient? Yes   Assesment completed? Yes   Plan Therapy being initiated   Do you need to open a clinical intervention (i-vent)? No   Do you want to schedule first shipment? Yes   Medication #1 Assessment Info    Patient status New medication   Is this medication appropriate for the patient? Yes   Is this  medication effective? Not yet started        Refill Questions - Documented Responses    Flowsheet Row Most Recent Value   Patient Availability and HIPAA Verification    Does patient want to proceed with activity? Yes   HIPAA/medical authority confirmed? Yes   Relationship to patient of person spoken to? Child   Refill Screening Questions    When does the patient need to receive the medication? 07/14/22   Refill Delivery Questions    How will the patient receive the medication? Delivery [place]   When does the patient need to receive the medication? 07/14/22   Shipping Address Home   Address in [facility] confirmed and updated if neccessary? Yes   Expected Copay ($) 4   Is the patient able to afford the medication copay? Yes   Payment Method new CC added to file   Days supply of Refill 28   Supplies needed? No supplies needed   Refill activity completed? Yes   Refill activity plan Refill scheduled   Shipment/Pickup Date: 07/14/22          Objective    She has a past medical history of Arthritis, Back pain, Cancer, Depression, Diabetes mellitus, Fibromyalgia, Hyperlipidemia, Hypertension, Lupus, and Stroke.        BP Readings from Last 4 Encounters:   07/01/22 (!) 161/70   06/27/22 126/76   06/20/22 (!) 120/90   05/07/22 135/64     Ht Readings from Last 4 Encounters:   07/01/22 5' (1.524 m)   06/20/22 5' (1.524 m)   05/06/22 5' (1.524 m)   04/14/22 5' (1.524 m)     Wt Readings from Last 4 Encounters:   07/01/22 83.9 kg (184 lb 15.5 oz)   06/27/22 83.9 kg (185 lb)   06/20/22 82.9 kg (182 lb 12.2 oz)   05/06/22 83.9 kg (185 lb)       The goals of prescribed drug therapy management include:  · Supporting patient to meet the prescriber's medical treatment objectives  · Improving or maintaining quality of life  · Maintaining optimal therapy adherence  · Minimizing and managing side effects      Goals of Therapy Status: Discussed (new start)    Assessment/Plan  Patient plans to start therapy on 07/14/22      Indication,  dosage, appropriateness, effectiveness, safety and convenience of her specialty medication(s) were reviewed today.     Patient Education   Patient received education on the following:    Expectations and possible outcomes of therapy   Proper use, timely administration, and missed dose management   Duration of therapy   Side effects, including prevention, minimization, and management   Contraindications and safety precautions   New or changed medications, including prescribe and over the counter medications and supplements   Reviews recommended vaccinations, as appropriate   Storage, safe handling, and disposal        Tasks added this encounter   No tasks added.   Tasks due within next 3 months   6/29/2022 - Clinical - Initial Assessment (Manual Add)     Karon Zarate, PharmD  [facility]  [address] [address]  Phone: [phone]  Fax: [phone]

## 2022-07-12 NOTE — TELEPHONE ENCOUNTER
No new care gaps identified.  [facility] Embedded Care Gaps. Reference number: [id number]. 7/12/2022   2:11:44 PM CDT

## 2022-07-12 NOTE — TELEPHONE ENCOUNTER
Patient requesting refill on hydrocodone(norco)10-325mg  Last office visit 04/14/22   shows last refill on 06/13/22  Patient does have a pain contract on file with [facility]  Patient last UDS 02/14/22 was consistent with current therapy    Patient requesting refill on methocarbamoL (ROBAXIN) 500 mg  Last office visit 04/14/22   shows last refill on 06/13/22  Patient does have a pain contract on file with [facility]  Patient last UDS 02/14/22 was consistent with current therapy  CODEINE  Not Detected     MORPHINE  Not Detected     6-ACETYLMORPHINE  Not Detected     OXYCODONE  Not Detected     NOROYXCODONE  Not Detected     OXYMORPHONE  Not Detected     NOROXYMORPHONE  Not Detected     HYDROCODONE  Present     NORHYDROCODONE  Present     HYDROMORPHONE  Present     BUPRENORPHINE  Not Detected     NORUBPRENORPHINE  Not Detected     FENTANYL  Not Detected     NORFENTANYL  Not Detected     MEPERIDINE METABOLITE  Not Detected     TAPENTADOL  Not Detected     TAPENTADOL-O-SULF  Not Detected     METHADONE  Not Detected     TRAMADOL  Not Detected     AMPHETAMINE  Not Detected     METHAMPHETAMINE  Not Detected     MDMA- ECSTASY  Not Detected     MDA  Not Detected     MDEA- Mary  Not Detected     METHYLPHENIDATE  Not Detected     PHENTERMINE  Not Detected     BENZOYLECGONINE  Not Detected     ALPRAZOLAM  Not Detected     ALPHA-OH-ALPRAZOLAM  Not Detected     CLONAZEPAM  Not Detected     7-AMINOCLONAZEPAM  Not Detected     DIAZEPAM  Not Detected     NORDIAZEPAM  Not Detected     OXAZEPAM  Not Detected     TEMAZEPAM  Not Detected     Lorazepam  Not Detected     MIDAZOLAM  Not Detected     ZOLPIDEM  Not Detected     BARBITURATES  Not Detected     Creatinine, Urine 20.0 - 400.0 mg/dL 62.7  36.9 R, CM    ETHYL GLUCURONIDE  Not Detected     MARIJUANA METABOLITE  Not Detected     PCP  Not Detected     CARISOPRODOL  Not Detected     Comment: The carisoprodol  immunoassay has cross-reactivity to   carisoprodol and meprobamate.    Naloxone  Not Detected     Gabapentin  Not Detected     Pregabalin  Present     Alpha-OH-Midazolam  Not Detected     Zolpidem Metabolite  Not Detected

## 2022-07-13 PROBLEM — Z91.148 NONCOMPLIANCE WITH MEDICATION REGIMEN: Status: ACTIVE | Noted: 2022-07-13

## 2022-07-13 RX ORDER — METHOCARBAMOL 500 MG/1
500 TABLET, FILM COATED ORAL 3 TIMES DAILY PRN
Qty: 90 TABLET | Refills: 0 | Status: SHIPPED | OUTPATIENT
Start: 2022-07-13 | End: 2022-08-10 | Stop reason: SDUPTHER

## 2022-07-13 RX ORDER — NITROGLYCERIN 0.4 MG/1
0.4 TABLET SUBLINGUAL EVERY 5 MIN PRN
Qty: 25 TABLET | Refills: 0 | Status: SHIPPED | OUTPATIENT
Start: 2022-07-13 | End: 2022-08-21 | Stop reason: SDUPTHER

## 2022-07-13 RX ORDER — HYDROCODONE BITARTRATE AND ACETAMINOPHEN 10; 325 MG/1; MG/1
1 TABLET ORAL EVERY 8 HOURS PRN
Qty: 90 TABLET | Refills: 0 | Status: SHIPPED | OUTPATIENT
Start: 2022-07-13 | End: 2022-08-12 | Stop reason: SDUPTHER

## 2022-07-13 NOTE — TELEPHONE ENCOUNTER
Refill Routing Note   Medication(s) are not appropriate for processing by [facility] for the following reason(s):      - Medication not previously prescribed by PCP    [facility] action(s):  Defer          Medication reconciliation completed: No     Appointments  past 12m or future 3m with PCP    Date Provider   Last Visit   9/22/2021 Ayesha Mckay MD   Next Visit   Visit date not found Ayesha Mckay MD   ED visits in past 90 days: 0        Note composed:12:58 PM 07/13/2022

## 2022-07-14 ENCOUNTER — HOSPITAL ENCOUNTER (OUTPATIENT)
Dept: RADIOLOGY | Facility: OTHER | Age: 77
Discharge: HOME | End: 2022-07-14
Attending: STUDENT IN AN ORGANIZED HEALTH CARE EDUCATION/TRAINING PROGRAM
Payer: MEDICARE

## 2022-07-14 DIAGNOSIS — N95.9 POST MENOPAUSAL PROBLEMS: ICD-10-CM

## 2022-07-14 PROCEDURE — 77080 DXA BONE DENSITY AXIAL: CPT | Mod: 26,,, | Performed by: RADIOLOGY

## 2022-07-14 PROCEDURE — 77080 DXA BONE DENSITY AXIAL: CPT | Mod: TC

## 2022-07-14 PROCEDURE — 77080 DEXA BONE DENSITY SPINE HIP: ICD-10-PCS | Mod: 26,,, | Performed by: RADIOLOGY

## 2022-07-17 DIAGNOSIS — M81.0 AGE-RELATED OSTEOPOROSIS WITHOUT CURRENT PATHOLOGICAL FRACTURE: Primary | ICD-10-CM

## 2022-07-17 DIAGNOSIS — E11.65 TYPE 2 DIABETES MELLITUS WITH HYPERGLYCEMIA, WITH LONG-TERM CURRENT USE OF INSULIN: ICD-10-CM

## 2022-07-17 DIAGNOSIS — E78.1 HYPERTRIGLYCERIDEMIA: ICD-10-CM

## 2022-07-17 DIAGNOSIS — Z79.4 TYPE 2 DIABETES MELLITUS WITH HYPERGLYCEMIA, WITH LONG-TERM CURRENT USE OF INSULIN: ICD-10-CM

## 2022-07-17 DIAGNOSIS — E79.0 HYPERURICEMIA: Primary | ICD-10-CM

## 2022-07-17 RX ORDER — ALLOPURINOL 300 MG/1
300 TABLET ORAL DAILY
Qty: 90 TABLET | Refills: 1 | Status: SHIPPED | OUTPATIENT
Start: 2022-07-17 | End: 2023-02-01 | Stop reason: SDUPTHER

## 2022-07-17 RX ORDER — FENOFIBRATE 134 MG/1
134 CAPSULE ORAL
Qty: 90 CAPSULE | Refills: 3 | Status: SHIPPED | OUTPATIENT
Start: 2022-07-17 | End: 2023-08-09 | Stop reason: SDUPTHER

## 2022-07-18 ENCOUNTER — TELEPHONE (OUTPATIENT)
Dept: INTERNAL MEDICINE | Facility: CLINIC | Age: 77
End: 2022-07-18
Payer: MEDICARE

## 2022-07-18 NOTE — TELEPHONE ENCOUNTER
Schedule pt for 10/7/22 with Dr. Peters. Schedule pt for virtual appt on 8/5/22 with Dr. Zapata to discuss lab results. Pt verbalized understanding.     10.6 L, Hct 33.3 L, Na 140 wnl, K 4.5 wnl, Cl 106 wnl, BUN 26 wnl, BUN 13 wnl, Creat 0.72 wnl, Glu 155 H, POCT glucose 161 H, HbA1c 6.7 H, Alb 2.8 L

## 2022-07-18 NOTE — TELEPHONE ENCOUNTER
----- Message from Chun Zapata MD sent at 7/17/2022  1:30 PM CDT -----  Please help schedule in endocrinology with Dr. Tanner

## 2022-07-18 NOTE — TELEPHONE ENCOUNTER
----- Message from Chun Zapata MD sent at 7/17/2022 12:59 PM CDT -----  Please make us a virtual appt that fits for the patient and family.    thanks

## 2022-07-21 ENCOUNTER — PROCEDURE VISIT (OUTPATIENT)
Dept: UROLOGY | Facility: CLINIC | Age: 77
End: 2022-07-21
Attending: UROLOGY
Payer: MEDICARE

## 2022-07-21 VITALS — SYSTOLIC BLOOD PRESSURE: 134 MMHG | DIASTOLIC BLOOD PRESSURE: 61 MMHG | HEART RATE: 92 BPM

## 2022-07-21 DIAGNOSIS — N32.89 BLADDER WALL THICKENING: ICD-10-CM

## 2022-07-21 LAB
BILIRUB SERPL-MCNC: NEGATIVE MG/DL
BLOOD URINE, POC: 250
CLARITY, POC UA: CLEAR
COLOR, POC UA: YELLOW
GLUCOSE UR QL STRIP: 1000
KETONES UR QL STRIP: NEGATIVE
LEUKOCYTE ESTERASE URINE, POC: ABNORMAL
NITRITE, POC UA: NEGATIVE
PH, POC UA: 5
PROTEIN, POC: 30
SPECIFIC GRAVITY, POC UA: 1.02
UROBILINOGEN, POC UA: NORMAL

## 2022-07-21 PROCEDURE — 52000 CYSTOURETHROSCOPY: CPT | Mod: S$GLB,,, | Performed by: UROLOGY

## 2022-07-21 PROCEDURE — 52000 CYSTOSCOPY: ICD-10-PCS | Mod: S$GLB,,, | Performed by: UROLOGY

## 2022-07-21 PROCEDURE — 81002 POCT URINE DIPSTICK WITHOUT MICROSCOPE: ICD-10-PCS | Mod: S$GLB,,, | Performed by: UROLOGY

## 2022-07-21 PROCEDURE — 81002 URINALYSIS NONAUTO W/O SCOPE: CPT | Mod: S$GLB,,, | Performed by: UROLOGY

## 2022-07-21 RX ORDER — CIPROFLOXACIN 500 MG/1
500 TABLET ORAL
Status: COMPLETED | OUTPATIENT
Start: 2022-07-21 | End: 2022-07-21

## 2022-07-21 RX ORDER — LIDOCAINE HYDROCHLORIDE 20 MG/ML
JELLY TOPICAL
Status: COMPLETED | OUTPATIENT
Start: 2022-07-21 | End: 2022-07-21

## 2022-07-21 RX ADMIN — CIPROFLOXACIN 500 MG: 500 TABLET ORAL at 10:07

## 2022-07-21 RX ADMIN — LIDOCAINE HYDROCHLORIDE 1 ML: 20 JELLY TOPICAL at 10:07

## 2022-07-21 NOTE — PROCEDURES
Cystoscopy    Date/Time: 7/21/2022 8:30 AM  Performed by: Raffi Crisostomo MD  Authorized by: Celsa Ramos NP     Consent Done?:  Yes (Written)  Prep: patient was prepped and draped in usual sterile fashion    Anesthesia:  Lidocaine jelly  Position:  Dorsal lithotomy  Anesthesia:  Lidocaine jelly  Preparation: Patient was prepped and draped in usual sterile fashion    Scope type:  Flexible cystoscope  External exam normal: Yes    Urethra normal: Yes (Resistance initially noted that released with pressure c/w possible stricture)    Bladder neck normal: Yes    Bladder normal: No (Moderate to severe trabeculations. No tumors, lesions, or stones noted.  Normal bilateral UOs.)     Patient tolerated the procedure well with no immediate complications    Impression  No evidence of malignancy  Bladder wall thickening 2/2 detrusor hypertrophy, presumably 2/2 obstruction  Possible urethral stricture dilated with cystoscope today    Plan  Will monitor voiding symptoms which may improve if stricture dilated today  If obstructive urinary symptoms persist, will refer to UroGyn

## 2022-07-27 ENCOUNTER — TELEPHONE (OUTPATIENT)
Dept: ADMINISTRATIVE | Facility: HOSPITAL | Age: 77
End: 2022-07-27
Payer: MEDICARE

## 2022-08-09 ENCOUNTER — SPECIALTY PHARMACY (OUTPATIENT)
Dept: PHARMACY | Facility: CLINIC | Age: 77
End: 2022-08-09
Payer: MEDICARE

## 2022-08-09 ENCOUNTER — TELEPHONE (OUTPATIENT)
Dept: PAIN MEDICINE | Facility: CLINIC | Age: 77
End: 2022-08-09
Payer: MEDICARE

## 2022-08-09 NOTE — TELEPHONE ENCOUNTER
----- Message from Alonzo Uriel sent at 8/9/2022  2:26 PM CDT -----  Regarding: Refill  Contact: [phone]  RX Refill Request    Who Called:  Indira    Refill or New Rx: Refills    RX Name and Strength:  methocarbamoL (ROBAXIN) 500 MG Tab  HYDROcodone-acetaminophen (NORCO)  mg per tablet  pregabalin (LYRICA) 150 MG capsule    Preferred Pharmacy with phone number:[facility]    Local or Mail Order: Local    Ordering Provider:Holden Pereira MD    Would the patient rather a call back or a response via [facility]? Call Back    Best Call Back Number:[phone]

## 2022-08-09 NOTE — TELEPHONE ENCOUNTER
Specialty Pharmacy - Refill Coordination    Specialty Medication Orders Linked to Encounter    Flowsheet Row Most Recent Value   Medication #1 evolocumab (REPATHA SURECLICK) 140 mg/mL PnIj (Order#[id number], Rx#[id number])          Refill Questions - Documented Responses    Flowsheet Row Most Recent Value   Patient Availability and HIPAA Verification    Does patient want to proceed with activity? Yes   HIPAA/medical authority confirmed? Yes   Relationship to patient of person spoken to? Child   Refill Screening Questions    Would patient like to speak to a pharmacist? No   When does the patient need to receive the medication? 08/10/22   Refill Delivery Questions    How will the patient receive the medication? Pickup   When does the patient need to receive the medication? 08/10/22   Shipping Address Home   Address in [facility] confirmed and updated if neccessary? Yes   Expected Copay ($) 4   Is the patient able to afford the medication copay? Yes   Payment Method at pickup   Days supply of Refill 28   Supplies needed? No supplies needed   Refill activity completed? Yes   Refill activity plan Refill scheduled   Shipment/Pickup Date: 08/10/22          Current Outpatient Medications   Medication Sig    acetaminophen (TYLENOL) 500 MG tablet Take 2 tablets (1,000 mg total) by mouth every 8 (eight) hours as needed for Pain.    allopurinoL (ZYLOPRIM) 300 MG tablet Take 1 tablet (300 mg total) by mouth once daily.    aspirin (ECOTRIN) 81 MG EC tablet Take 1 tablet (81 mg total) by mouth once daily.    blood sugar diagnostic Strp 1 each by Misc.(Non-Drug; Combo Route) route 4 (four) times daily.    blood-glucose meter Misc Follow package directions (Patient taking differently: Follow package directions)    blood-glucose meter,continuous (DEXCOM G6 ) Misc 1 each by Misc.(Non-Drug; Combo Route) route continuous prn.    blood-glucose sensor (DEXCOM G6 SENSOR) Nicole 3 each by Misc.(Non-Drug; Combo Route)  route continuous prn. Change every 10 days.    blood-glucose transmitter (DEXCOM G6 TRANSMITTER) Nicole 1 each by Misc.(Non-Drug; Combo Route) route continuous prn.    cloNIDine (CATAPRES) 0.1 MG tablet Take 1 tablet (0.1 mg total) by mouth 2 (two) times daily. (Patient taking differently: Take 0.1 mg by mouth Daily.)    clopidogreL (PLAVIX) 75 mg tablet Take 1 tablet (75 mg total) by mouth once daily.    evolocumab (REPATHA SURECLICK) 140 mg/mL PnIj Inject 1 mL (140 mg total) into the skin every 14 (fourteen) days.    fenofibrate micronized (LOFIBRA) 134 MG Cap Take 1 capsule (134 mg total) by mouth daily with breakfast.    furosemide (LASIX) 40 MG tablet Take 1 tablet (40 mg total) by mouth once daily.    HYDROcodone-acetaminophen (NORCO)  mg per tablet Take 1 tablet by mouth every 8 (eight) hours as needed for Pain.    insulin aspart U-100 (NOVOLOG) 100 unit/mL (3 mL) InPn pen Inject 12 Units into the skin 3 (three) times daily with meals. Plus correction scale. Max TDD 40units.    insulin detemir U-100 (LEVEMIR FLEXTOUCH U-100 INSULN) 100 unit/mL (3 mL) InPn pen Inject 20 Units into the skin 2 (two) times daily.    ipratropium-albuteroL (COMBIVENT)  mcg/actuation inhaler Inhale 1 puff into the lungs by mouth every 6 (six) hours.    losartan (COZAAR) 50 MG tablet Take 1 tablet (50 mg total) by mouth once daily.    methocarbamoL (ROBAXIN) 500 MG Tab Take 1 tablet (500 mg total) by mouth 3 (three) times daily as needed (muscle spasm).    metoclopramide HCl (REGLAN) 5 MG tablet Take 1 tablet (5 mg total) by mouth 4 (four) times daily as needed (nausea, prevention).    metoprolol succinate (TOPROL-XL) 50 MG 24 hr tablet Take 1 tablet (50 mg total) by mouth once daily.    NIFEdipine (PROCARDIA-XL) 30 MG (OSM) 24 hr tablet Take 1 tablet (30 mg total) by mouth 2 (two) times a day.    nitroGLYCERIN (NITROSTAT) 0.4 MG SL tablet Place 1 tablet (0.4 mg total) under the tongue every 5 (five) minutes  "as needed for Chest pain.    pantoprazole (PROTONIX) 40 MG tablet Take 1 tablet (40 mg total) by mouth once daily.    pen needle, diabetic (BD ULTRA-FINE HIEN PEN NEEDLE) 32 gauge x 5/32" Ndle 1 each by Misc.(Non-Drug; Combo Route) route 4 (four) times daily.    pregabalin (LYRICA) 150 MG capsule Take 1 capsule (150 mg total) by mouth 3 (three) times daily.    senna-docusate 8.6-50 mg (PERICOLACE) 8.6-50 mg per tablet Take 1 tablet by mouth 2 (two) times daily as needed for Constipation.    sertraline (ZOLOFT) 100 MG tablet Take 1 tablet (100 mg total) by mouth once daily.    triamcinolone acetonide 0.1% (KENALOG) 0.1 % cream Apply to affected areas of body twice daily as needed rash. Do not use on face, underarms, or groin.    TRUEPLUS LANCETS 30 gauge Misc TEST AS DIRECTED FOUR TIMES DAILY   Last reviewed on 7/21/2022  9:23 AM by Sowmya Green RN    Review of patient's allergies indicates:   Allergen Reactions    Bleach (sodium hypochlorite) Shortness Of Breath    Nitrofurantoin macrocrystalline Anaphylaxis    Lipitor [atorvastatin] Diarrhea and Rash    Nsaids (non-steroidal anti-inflammatory drug)     Pcn [penicillins]     Toradol [ketorolac]     Last reviewed on  7/21/2022 9:21 AM by Sowmya Green      Tasks added this encounter   8/31/2022 - Refill Call (Auto Added)  8/11/2022 - Pickup Reminder   Tasks due within next 3 months   No tasks due.     Gosia Almaraz [facility]  [address] [address]  Phone: [phone]  Fax: [phone]      "

## 2022-08-09 NOTE — TELEPHONE ENCOUNTER
Staff spoke with patient daughter and confirmed that medication request has been received and will be submitted to  and once a response is received you will be updated.

## 2022-08-10 ENCOUNTER — PATIENT MESSAGE (OUTPATIENT)
Dept: PAIN MEDICINE | Facility: CLINIC | Age: 77
End: 2022-08-10
Payer: MEDICARE

## 2022-08-10 DIAGNOSIS — G89.4 CHRONIC PAIN SYNDROME: ICD-10-CM

## 2022-08-10 DIAGNOSIS — G89.4 CHRONIC PAIN DISORDER: ICD-10-CM

## 2022-08-10 DIAGNOSIS — E08.42 DIABETIC POLYNEUROPATHY ASSOCIATED WITH DIABETES MELLITUS DUE TO UNDERLYING CONDITION: ICD-10-CM

## 2022-08-10 RX ORDER — METHOCARBAMOL 500 MG/1
500 TABLET, FILM COATED ORAL 3 TIMES DAILY PRN
Qty: 90 TABLET | Refills: 0 | Status: SHIPPED | OUTPATIENT
Start: 2022-08-10 | End: 2022-09-12 | Stop reason: SDUPTHER

## 2022-08-11 ENCOUNTER — OFFICE VISIT (OUTPATIENT)
Dept: PAIN MEDICINE | Facility: CLINIC | Age: 77
End: 2022-08-11
Payer: MEDICARE

## 2022-08-11 VITALS
WEIGHT: 183 LBS | RESPIRATION RATE: 18 BRPM | OXYGEN SATURATION: 99 % | BODY MASS INDEX: 35.93 KG/M2 | DIASTOLIC BLOOD PRESSURE: 81 MMHG | SYSTOLIC BLOOD PRESSURE: 164 MMHG | HEART RATE: 78 BPM | HEIGHT: 60 IN

## 2022-08-11 DIAGNOSIS — G89.4 CHRONIC PAIN SYNDROME: ICD-10-CM

## 2022-08-11 DIAGNOSIS — E08.42 DIABETIC POLYNEUROPATHY ASSOCIATED WITH DIABETES MELLITUS DUE TO UNDERLYING CONDITION: Primary | ICD-10-CM

## 2022-08-11 DIAGNOSIS — E11.42 DIABETIC PERIPHERAL NEUROPATHY: ICD-10-CM

## 2022-08-11 DIAGNOSIS — G89.4 CHRONIC PAIN DISORDER: ICD-10-CM

## 2022-08-11 PROCEDURE — 99999 PR PBB SHADOW E&M-EST. PATIENT-LVL V: ICD-10-PCS | Mod: PBBFAC,,, | Performed by: NURSE PRACTITIONER

## 2022-08-11 PROCEDURE — 99214 PR OFFICE/OUTPT VISIT, EST, LEVL IV, 30-39 MIN: ICD-10-PCS | Mod: S$PBB,,, | Performed by: NURSE PRACTITIONER

## 2022-08-11 PROCEDURE — 99214 OFFICE O/P EST MOD 30 MIN: CPT | Mod: S$PBB,,, | Performed by: NURSE PRACTITIONER

## 2022-08-11 PROCEDURE — 99215 OFFICE O/P EST HI 40 MIN: CPT | Mod: PBBFAC | Performed by: NURSE PRACTITIONER

## 2022-08-11 PROCEDURE — 99999 PR PBB SHADOW E&M-EST. PATIENT-LVL V: CPT | Mod: PBBFAC,,, | Performed by: NURSE PRACTITIONER

## 2022-08-11 NOTE — PROGRESS NOTES
Chronic patient Established Note (Follow up visit)    Interval History 8/12/2022:  Mrs Waters presents for delayed FU. She has had continuous pain to lumbar and radicular pain but also peripheral neuropathy complaints. Over interval she has had CVA but doing fair. Her A1C has unfortunately elevated above 10 at this time and SCS trial placed on hold but phychiatric evaluation complete. She continues to take Norco 10/325mg TID, Robaxin 500mg TID and Lyrica 150mg TID with some benefit and denies SE of medications. No s/s concerning for cauda equina.     Interval History 4/12/2022:  Patient returns to clinic today for follow-up. Her neuropathic pain continues to be an issue for her, but she says that she is able to manage. She is taking Norco 10-325mg TID, Robaxin 500mg TID, and Lyrica 150mg TID without any adverse side effects. She denies any changes in her symptoms. She would like to proceed with SCS trial. Discussed last time that her HbA1c should be <10, was 9.8 on most recent labs. Seen by psychology and cleared for SCS.     Interval History 2/14/2022:  Mrs Waters presents for follow up. Pt states overall neuropathy continues. Since last visit she has been stable with medication mgt and takign Norco 10/325mg TID, Robaxin 500mg TID and Lyrica 150mg TID. She denies new areas of pain or neurological changes. Medication adequate to control pain without adverse SE.      Interval History 12/21/2021:  Pt presents for urgent evaluation s/p fall in kitchen last night. Pt unsure of LOC or head trauma but states some amnesia of events. Pt is having left knee pain, B ankle pain L>R and lower back/buttock pain. Daughter further states tremor like activity last night. During visit daughter asked for bedside commode due to inability to ambulate.  Pt during visit appeared somnolent, pale and began vomiting. Discussed going to ER for further evaluation.      Interval History 12/14/2021:  Mrs Waters presents for follow up of chronic  pain complaints. She is hopeful she will have A1C lower soon to move forward with SCS. She is doing fair with medication mgt alone at this time and denies SE of medications. Pt is currently taking Norco 10/325mg TID PRN, Lyrica 150mg TID, and Robaxin 500mg TID. She denies new areas of pain or neurological changes.      Interval History 10/14/2021:  The Pt presents for follow up and s/p B Lumbar sympathetic blocks. Pt states this has done well but ready to proceed with SCS trial. She is aware A1C must be under tight control and Pt and daughter re-iterate knowing this. Pt also mentions DKA admission and diagnosis of vasculitis as well over interval. Pt continues to take hydrocodone 10/325 mg TID PRN, Lyrica 150 mg TID, and Robaxin 500mg TID. She denies any SE of medication, denies new neurological changes.      Interval Hx 09/16/2021  Indira Waters presents to the clinic for a follow-up appointment for f/u after bilateral lumbar sympathetic block on 8/25/21.Patient reports >50% relief after lumbar sympathetic block that lasted 2 weeks when she then developed a UTI/DKA, was admitted to the hospital and pain recurred.  Current pain intensity is 8/10.     Interval History 8/12/2021:  Indira Waters presents to the clinic for a follow-up appointment for BLE diabetic neuropathy, painful. Continues with Norco 10/325mg, Lyrica 150mg TID, Robaxin 750mg daily PRN. She had to cancel previously scheduled lumbar sympathetic block 2/2 lithotripsy for painful kidney stones, which have now passed. She still has intermittent pain with urination but overall that pain is improving. She had to cancel previous angiogram for this same reason - this has not been rescheduled yet. She denies any change in her LE pain. Denies any new neurological sxs in BLEs.     Interval History 6/10/21:  Indira Waters presents to the clinic for a 2 week follow-up appointment from lumbar sympathetic nerve block in early May. She reports minimal  "relief from this intervention, but did note that it helped some, briefly. Since the last visit, Indira Waters states the pain has been persistant. Current pain intensity is 10/10. Patient has chronic generalized diffuse pain, most pronounced in her BL lower extremities 2/2 diabetic neuropathy. HSe states that the pain is a little better than at her last visit. Most days are 10/10, but some days are better than others. Her pain is aggravated by exertion, walking, or sitting/standing for extended periods of time. He pain is mildly improved by rest and medications. She is currently taking Lyrica 150 PO TID, Zoloft, and Norco 10-325mg TID PRN. She states that the pain meds are helping but she is still constantly in pain and unable to participate in her ADLs. She has now established care with PCP for assistance w/ poorly controlled T2DM, last A1c 11.4. She has not yet established care w/ Rheumatology for fibromyalgia management.    Interval HPI 4/15/21:  Patient returns for follow up of chronic generalized diffuse pain. At the last visit, had EMG (not yet completed), lumbar and hip x-ray imaging ordered. She was also referred to Rheumatology for fibromyalgia management and referral to PCP for DM2 management and weaning of zoloft for transition to cymbalta for treatment of neuropathy. She had her Lyrica increased to 150mg PO TID, and prescribed Norco 10mg TID with opioid contract signed. She has been taking Norco 10mg TID and it has been helping her "bad" pains but does not take all of her pain away. She has not yet been set up with her new PCP or rheumatologist yet for fibromyalgia. Still continues to have generalized pain everywhere including feet, hips, legs, lower and upper back, neck and shoulder pain. Continues to have neuropathy in the bilateral lower extremities due to uncontrolled DM2.    Initial Visit 3/11/21:  Indira Waters presents to the clinic for the evaluation of chronic pain. Complaining of pain " everywhere including feet, legs, hips, lower and upper back, neck, shoulder. Pain started 5+ years ago. Pain 10/10 at worse. She was referred here by her PCP Dr. Ramos who she has been seeing for over 6 years. She states her pain is aggravated by any physical activity/movement. She takes lyrica, robaxin, and Norco 10 TID with mild relief of pain. Per patient, she was referred here by her PCP for medication refill. She has not tried physical therapy for several years, she states it did not help last time she was in PT. She says she is trying to exercise daily by doing yoga. She walks with a walker. She has numerous comorbidities including DM2 (Last A1C 9+ per prior family medicine note in Jan 2021), fibromyalgia, lupus, DDD. She states she would like to find a new PCP as she no longer lives near her old one. Patient denies night fever/night sweats, urinary incontinence, bowel incontinence and significant weight loss.      Pain Disability Index Review:  Last 3 PDI Scores 8/11/2022 4/14/2022 2/14/2022   Pain Disability Index (PDI) 40 50 56     Pain Medications:  Norco 10-325mg TID, Robaxin 500mg TID, and Lyrica 150mg TID    Opioid Contract: yes     report:  Reviewed and consistent with medication use as prescribed.    Pain Procedures:    - reports possible back injection 10+ years ago  - Lumbar sympathetic nerve block 05/05/21 - Dr. Pereira - minimal relief    Physical Therapy/Home Exercise: no     Imaging:   Hip X-ray 4/7/21  FINDINGS:  Osseous structures appear intact without evidence of fracture or osseous destructive process.  No apparent dislocation.     Modest degenerative change or significant joint space narrowing.     Lumbar X-ray 4/7/21  FINDINGS:  Diffuse bony demineralization.  Vertebral bodies are normal in height without evidence of fracture.  No pars defects.     Normal sagittal alignment is preserved.  No spondylolisthesis. No abnormal translation with flexion and extension.     Intervertebral disc  heights are well maintained.  Mild facet arthropathy in the lower lumbar spine.     Mild scattered vascular calcification.     Impression:     No evidence of fracture or malalignment.     Mild facet arthropathy in the lower lumbar spine.     Diffuse bony demineralization.  Consider correlation with DEXA.    Allergies:   Review of patient's allergies indicates:   Allergen Reactions    Bleach (sodium hypochlorite) Shortness Of Breath    Nitrofurantoin macrocrystalline Anaphylaxis    Lipitor [atorvastatin] Diarrhea and Rash    Nsaids (non-steroidal anti-inflammatory drug)     Pcn [penicillins]     Toradol [ketorolac]        Current Medications:   Current Outpatient Medications   Medication Sig Dispense Refill    acetaminophen (TYLENOL) 500 MG tablet Take 2 tablets (1,000 mg total) by mouth every 8 (eight) hours as needed for Pain.  0    allopurinoL (ZYLOPRIM) 300 MG tablet Take 1 tablet (300 mg total) by mouth once daily. 90 tablet 1    aspirin (ECOTRIN) 81 MG EC tablet Take 1 tablet (81 mg total) by mouth once daily. 30 tablet 0    blood sugar diagnostic Strp 1 each by Misc.(Non-Drug; Combo Route) route 4 (four) times daily. 200 each 0    blood-glucose meter Misc Follow package directions (Patient taking differently: Follow package directions) 1 each 0    blood-glucose meter,continuous (DEXCOM G6 ) Misc 1 each by Misc.(Non-Drug; Combo Route) route continuous prn. 1 each PRN    blood-glucose sensor (DEXCOM G6 SENSOR) Nicole 3 each by Misc.(Non-Drug; Combo Route) route continuous prn. Change every 10 days. 3 each PRN    blood-glucose transmitter (DEXCOM G6 TRANSMITTER) Nicole 1 each by Misc.(Non-Drug; Combo Route) route continuous prn. 1 each PRN    cloNIDine (CATAPRES) 0.1 MG tablet Take 1 tablet (0.1 mg total) by mouth 2 (two) times daily. (Patient taking differently: Take 0.1 mg by mouth Daily.) 180 tablet 0    clopidogreL (PLAVIX) 75 mg tablet Take 1 tablet (75 mg total) by mouth once daily. 30  "tablet 11    evolocumab (REPATHA SURECLICK) 140 mg/mL PnIj Inject 1 mL (140 mg total) into the skin every 14 (fourteen) days. 8 each 3    fenofibrate micronized (LOFIBRA) 134 MG Cap Take 1 capsule (134 mg total) by mouth daily with breakfast. 90 capsule 3    furosemide (LASIX) 40 MG tablet Take 1 tablet (40 mg total) by mouth once daily. 30 tablet 11    HYDROcodone-acetaminophen (NORCO)  mg per tablet Take 1 tablet by mouth every 8 (eight) hours as needed for Pain. 90 tablet 0    insulin aspart U-100 (NOVOLOG) 100 unit/mL (3 mL) InPn pen Inject 12 Units into the skin 3 (three) times daily with meals. Plus correction scale. Max TDD 40units. 15 mL 3    insulin detemir U-100 (LEVEMIR FLEXTOUCH U-100 INSULN) 100 unit/mL (3 mL) InPn pen Inject 20 Units into the skin 2 (two) times daily. 15 mL 3    ipratropium-albuteroL (COMBIVENT)  mcg/actuation inhaler Inhale 1 puff into the lungs by mouth every 6 (six) hours. 4 g 0    methocarbamoL (ROBAXIN) 500 MG Tab Take 1 tablet (500 mg total) by mouth 3 (three) times daily as needed (muscle spasm). 90 tablet 0    metoclopramide HCl (REGLAN) 5 MG tablet Take 1 tablet (5 mg total) by mouth 4 (four) times daily as needed (nausea, prevention). 30 tablet 3    metoprolol succinate (TOPROL-XL) 50 MG 24 hr tablet Take 1 tablet (50 mg total) by mouth once daily. 90 tablet 0    nitroGLYCERIN (NITROSTAT) 0.4 MG SL tablet Place 1 tablet (0.4 mg total) under the tongue every 5 (five) minutes as needed for Chest pain. 25 tablet 0    pantoprazole (PROTONIX) 40 MG tablet Take 1 tablet (40 mg total) by mouth once daily. 30 tablet 0    pen needle, diabetic (BD ULTRA-FINE HIEN PEN NEEDLE) 32 gauge x 5/32" Ndle 1 each by Misc.(Non-Drug; Combo Route) route 4 (four) times daily. 400 each 1    pregabalin (LYRICA) 150 MG capsule Take 1 capsule (150 mg total) by mouth 3 (three) times daily. 90 capsule 2    senna-docusate 8.6-50 mg (PERICOLACE) 8.6-50 mg per tablet Take 1 tablet " by mouth 2 (two) times daily as needed for Constipation. 60 tablet 0    sertraline (ZOLOFT) 100 MG tablet Take 1 tablet (100 mg total) by mouth once daily. 90 tablet 3    triamcinolone acetonide 0.1% (KENALOG) 0.1 % cream Apply to affected areas of body twice daily as needed rash. Do not use on face, underarms, or groin. 454 g 0    TRUEPLUS LANCETS 30 gauge Misc TEST AS DIRECTED FOUR TIMES DAILY 200 each 2    losartan (COZAAR) 50 MG tablet Take 1 tablet (50 mg total) by mouth once daily. 90 tablet 0    NIFEdipine (PROCARDIA-XL) 30 MG (OSM) 24 hr tablet Take 1 tablet (30 mg total) by mouth 2 (two) times a day. 180 tablet 0     Current Facility-Administered Medications   Medication Dose Route Frequency Provider Last Rate Last Admin    capsaicin-skin cleanser 8 % patch Kit 2 patch  2 patch Topical (Top) 1 time in Clinic/HOD Colin Gibbs NP           REVIEW OF SYSTEMS:    GENERAL:  No weight loss, malaise or fevers.  HEENT:  Negative for frequent or significant headaches.  NECK:  Negative for lumps, goiter, pain and significant neck swelling.  RESPIRATORY:  Negative for cough, wheezing or shortness of breath.  CARDIOVASCULAR:  Negative for chest pain, leg swelling or palpitations.  GI:  Negative for abdominal discomfort, blood in stools or black stools or change in bowel habits.  MUSCULOSKELETAL:  See HPI.  SKIN:  Negative for lesions, rash, and itching.  PSYCH:  Negative for sleep disturbance, mood disorder and recent psychosocial stressors.  HEMATOLOGY/LYMPHOLOGY:  Negative for prolonged bleeding, bruising easily or swollen nodes.  NEURO:   No history of headaches, syncope, paralysis, seizures or tremors.  All other reviewed and negative other than HPI.    Past Medical History:  Past Medical History:   Diagnosis Date    Arthritis     Back pain     Cancer     ovarian    Depression     Diabetes mellitus     Fibromyalgia     Hyperlipidemia     Hypertension     Lupus     Stroke     slight left sided  weakness       Past Surgical History:  Past Surgical History:   Procedure Laterality Date    APPENDECTOMY       SECTION      CORONARY ANGIOGRAPHY N/A 2022    Procedure: ANGIOGRAM, CORONARY ARTERY;  Surgeon: Jose L Méndez MD;  Location: [facility] CATH LAB;  Service: Cardiology;  Laterality: N/A;    HYSTERECTOMY      INJECTION OF ANESTHETIC AGENT AROUND NERVE Bilateral 2021    Procedure: BLOCK, NERVE, SYMPATHIC;  Surgeon: Holden Pereira MD;  Location: [facility] PAIN MGT;  Service: Pain Management;  Laterality: Bilateral;    INJECTION OF ANESTHETIC AGENT AROUND NERVE N/A 2021    Procedure: BLOCK, NERVE, SYMPATHETIC  need consent;  Surgeon: Holden Pereira MD;  Location: [facility] PAIN MGT;  Service: Pain Management;  Laterality: N/A;    [place] EVAL,SWALLOW FUNCTION,CINE/VIDEO RECORD  2021         TONSILLECTOMY         Family History:  Family History   Problem Relation Age of Onset    COPD Mother     Lupus Mother     Hernia Mother     Uterine cancer Mother         vs cervical cancer    Ovarian cancer Mother     Diabetes Father     Coronary artery disease Father     Colon cancer Maternal Grandmother         in her 50's       Social History:  Social History     Socioeconomic History    Marital status:    Tobacco Use    Smoking status: Former Smoker     Quit date: 2020     Years since quittin.7    Smokeless tobacco: Never Used   Substance and Sexual Activity    Alcohol use: No    Drug use: No    Sexual activity: Not Currently     Social Determinants of Health     Financial Resource Strain: Low Risk     Difficulty of Paying Living Expenses: Not hard at all   Food Insecurity: No Food Insecurity    Worried About Running Out of Food in the Last Year: Never true    Ran Out of Food in the Last Year: Never true   Transportation Needs: No Transportation Needs    Lack of Transportation (Medical): No    Lack of Transportation (Non-Medical): No   Physical Activity:  Insufficiently Active    Days of Exercise per Week: 3 days    Minutes of Exercise per Session: 20 min   Stress: No Stress Concern Present    Feeling of Stress : Not at all   Social Connections: Socially Isolated    Frequency of Communication with Friends and Family: More than three times a week    Frequency of Social Gatherings with Friends and Family: Never    Attends Catholic Services: Never    Active Member of Clubs or Organizations: No    Attends Club or Organization Meetings: Never    Marital Status:    Housing Stability: Low Risk     Unable to Pay for Housing in the Last Year: No    Number of Places Lived in the Last Year: 1    Unstable Housing in the Last Year: No       OBJECTIVE:    BP (!) 164/81 (BP Location: Right arm, Patient Position: Sitting, BP Method: X-Large (Automatic))   Pulse 78   Resp 18   Ht 5' (1.524 m)   Wt 83 kg (182 lb 15.7 oz)   SpO2 99%   BMI 35.74 kg/m²     PHYSICAL EXAMINATION:    General appearance: Well appearing, in no acute distress, alert and oriented x3.  Psych:  Mood and affect appropriate.  Skin: Skin color, texture, turgor normal, no rashes or lesions, in both upper and lower body.  Head/face:  Atraumatic, normocephalic. No palpable lymph nodes  Cor: RRR  Pulm: Non-labored  Back: Straight leg raising in the sitting and supine positions is negative to radicular pain. No pain to palpation over the spine or costovertebral angles.  Extremities: Peripheral joint ROM is full and pain free without obvious instability or laxity in all four extremities. No deformities, edema, or skin discoloration. Good capillary refill.  Musculoskeletal: Bilateral upper and lower extremity strength is normal and symmetric.  No atrophy or tone abnormalities are noted.  Neuro: Muscle stretch reflexes are diminished but symmetric. Altered sensation in BLE at baseline. Painful paresthesias in BLE.   Gait: Antalgic, uses RW for ambulation    ASSESSMENT: 77 y.o. year old female with  chronic pain, consistent with:     1. Diabetic polyneuropathy associated with diabetes mellitus due to underlying condition     2. Diabetic peripheral neuropathy  capsaicin-skin cleanser 8 % patch Kit 2 patch       PLAN:     - I have stressed the importance of physical activity and a home exercise plan to help with pain and improve health.  - Patient can continue with medications for now since they are providing benefits, using them appropriately, and without side effects.  - Psych evaluation complete but now waiting till A1C can drop below 10 again  - in meantime will order Qutenza patch to address PDN  - Discussed Nevro SCS implant for PDN.  - When  HbA1c becomes <10, will proceed with SCS trial with Nevro for PDN  - UDS 2/14/22 reviewed.   - Continue Robaxin 500mg TID #90  - Refilled Norco 10/325mg TID #90 Last fill 7/13/2022  - Continue Lyrica 150mg TID #90  - RTC for Qutenza application.   - Counseled patient regarding the importance of activity modification, constant sleeping habits and physical therapy.    The above plan and management options were discussed at length with patient. Patient is in agreement with the above and verbalized understanding.    Colin Gibbs NP  8/11/2022    I spent a total of 30 minutes on the day of the visit.  This includes face to face time and non-face to face time preparing to see the patient by reviewing previous labs/imaging, obtaining and/or reviewing separately obtained history, documenting clinical information in the electronic or other health record, independently interpreting results and communicating results to the patient/family/caregiver.

## 2022-08-12 ENCOUNTER — NURSE TRIAGE (OUTPATIENT)
Dept: ADMINISTRATIVE | Facility: CLINIC | Age: 77
End: 2022-08-12
Payer: MEDICARE

## 2022-08-12 DIAGNOSIS — E08.42 DIABETIC POLYNEUROPATHY ASSOCIATED WITH DIABETES MELLITUS DUE TO UNDERLYING CONDITION: ICD-10-CM

## 2022-08-12 DIAGNOSIS — G89.4 CHRONIC PAIN DISORDER: ICD-10-CM

## 2022-08-12 DIAGNOSIS — G89.4 CHRONIC PAIN SYNDROME: ICD-10-CM

## 2022-08-12 NOTE — TELEPHONE ENCOUNTER
Spoke with Esha (daughter) and patient.  Esha states prescription for Norco was supposed to be sent to [facility] ([place] today.  Patient was seen in office on yesterday by Colin Gibbs NP.  Spoke with on call provider Dr. Lee Morrell who states patient will have to follow up with office during business hours.  Patient given information and she verbalized understanding.  Informed patient that message would be sent to office.  Advised ER for uncontrolled pain.  Patient verbalized understanding.     Reason for Disposition   [1] Caller has URGENT medicine question about med that PCP or specialist prescribed AND [2] triager unable to answer question    Protocols used: MEDICATION QUESTION CALL-A-

## 2022-08-12 NOTE — TELEPHONE ENCOUNTER
----- Message from David Ortiz sent at 8/12/2022  2:53 PM CDT -----  Name of Who is Calling: Esha (daughter)         What is the request in detail: Esha is calling to speak to the nurse in regards to medication for the patient. Please advise          Can the clinic reply by [facility]: No         What Number to Call Back if not in [facility]: [phone]

## 2022-08-12 NOTE — TELEPHONE ENCOUNTER
----- Message from Alonzo Trevino sent at 8/12/2022  1:22 PM CDT -----  Regarding: Refills  Contact: [phone]  RX Refill Request    Who Called: Indira    Refill or New Rx: Refills    RX Name and Strength: HYDROcodone-acetaminophen (NORCO)  mg per tablet    How is the patient currently taking it? (ex. 1XDay):    Preferred Pharmacy with phone number:[facility]    Local or Mail Order: Local    Would the patient rather a call back or a [facility]? Call Back    Best Call Back Number:[phone]

## 2022-08-12 NOTE — TELEPHONE ENCOUNTER
Patient requesting refill on Norco 10-325mg  Last office visit 08/12/22   shows last refill on 7/13/22  Patient DO NOT have a pain contract on file with [facility]  Patient last UDS 2/14/22 was consistent with current therapy    CODEINE  Not Detected     MORPHINE  Not Detected     6-ACETYLMORPHINE  Not Detected     OXYCODONE  Not Detected     NOROYXCODONE  Not Detected     OXYMORPHONE  Not Detected     NOROXYMORPHONE  Not Detected     HYDROCODONE  Present     NORHYDROCODONE  Present     HYDROMORPHONE  Present     BUPRENORPHINE  Not Detected     NORUBPRENORPHINE  Not Detected     FENTANYL  Not Detected     NORFENTANYL  Not Detected     MEPERIDINE METABOLITE  Not Detected     TAPENTADOL  Not Detected     TAPENTADOL-O-SULF  Not Detected     METHADONE  Not Detected     TRAMADOL  Not Detected     AMPHETAMINE  Not Detected     METHAMPHETAMINE  Not Detected     MDMA- ECSTASY  Not Detected     MDA  Not Detected     MDEA- Mary  Not Detected     METHYLPHENIDATE  Not Detected     PHENTERMINE  Not Detected     BENZOYLECGONINE  Not Detected     ALPRAZOLAM  Not Detected     ALPHA-OH-ALPRAZOLAM  Not Detected     CLONAZEPAM  Not Detected     7-AMINOCLONAZEPAM  Not Detected     DIAZEPAM  Not Detected     NORDIAZEPAM  Not Detected     OXAZEPAM  Not Detected     TEMAZEPAM  Not Detected     Lorazepam  Not Detected     MIDAZOLAM  Not Detected     ZOLPIDEM  Not Detected     BARBITURATES  Not Detected     Creatinine, Urine 20.0 - 400.0 mg/dL 62.7  36.9 R, CM    ETHYL GLUCURONIDE  Not Detected     MARIJUANA METABOLITE  Not Detected     PCP  Not Detected     CARISOPRODOL  Not Detected     Comment: The carisoprodol immunoassay has cross-reactivity to   carisoprodol and meprobamate.    Naloxone  Not Detected     Gabapentin  Not Detected     Pregabalin  Present     Alpha-OH-Midazolam  Not Detected     Zolpidem Metabolite  Not Detected

## 2022-08-12 NOTE — TELEPHONE ENCOUNTER
Patient's daughter was contacted staff could not leave a message due to the mailbox not being set up

## 2022-08-13 RX ORDER — HYDROCODONE BITARTRATE AND ACETAMINOPHEN 10; 325 MG/1; MG/1
1 TABLET ORAL EVERY 8 HOURS PRN
Qty: 90 TABLET | Refills: 0 | OUTPATIENT
Start: 2022-08-13 | End: 2022-09-12

## 2022-08-13 RX ORDER — HYDROCODONE BITARTRATE AND ACETAMINOPHEN 10; 325 MG/1; MG/1
1 TABLET ORAL EVERY 8 HOURS PRN
Qty: 90 TABLET | Refills: 0 | Status: SHIPPED | OUTPATIENT
Start: 2022-08-13 | End: 2022-09-08 | Stop reason: SDUPTHER

## 2022-08-21 DIAGNOSIS — I20.89 ANGINA AT REST: ICD-10-CM

## 2022-08-21 DIAGNOSIS — K31.84 GASTROPARESIS: ICD-10-CM

## 2022-08-21 DIAGNOSIS — I10 ESSENTIAL HYPERTENSION: ICD-10-CM

## 2022-08-21 DIAGNOSIS — D69.2 PALPABLE PURPURA: ICD-10-CM

## 2022-08-22 RX ORDER — NITROGLYCERIN 0.4 MG/1
0.4 TABLET SUBLINGUAL EVERY 5 MIN PRN
Qty: 25 TABLET | Refills: 0 | Status: SHIPPED | OUTPATIENT
Start: 2022-08-22 | End: 2022-09-26 | Stop reason: SDUPTHER

## 2022-08-22 RX ORDER — CLONIDINE HYDROCHLORIDE 0.1 MG/1
0.1 TABLET ORAL 2 TIMES DAILY
Qty: 180 TABLET | Refills: 0 | Status: SHIPPED | OUTPATIENT
Start: 2022-08-22 | End: 2023-01-02 | Stop reason: SDUPTHER

## 2022-08-22 RX ORDER — METOCLOPRAMIDE 5 MG/1
5 TABLET ORAL 4 TIMES DAILY PRN
Qty: 30 TABLET | Refills: 3 | Status: SHIPPED | OUTPATIENT
Start: 2022-08-22 | End: 2022-11-08 | Stop reason: SDUPTHER

## 2022-08-22 RX ORDER — TRIAMCINOLONE ACETONIDE 1 MG/G
CREAM TOPICAL
Qty: 454 G | Refills: 0 | Status: SHIPPED | OUTPATIENT
Start: 2022-08-22

## 2022-08-22 NOTE — TELEPHONE ENCOUNTER
Refill Routing Note   Medication(s) are not appropriate for processing by [facility] for the following reason(s):      - Outside of protocol  - Medication is a new start (<3 months)    [facility] action(s):  Defer  Route          Medication reconciliation completed: No     Appointments  past 12m or future 3m with PCP    Date Provider   Last Visit   6/20/2022 Chun Zapata MD   Next Visit   8/21/2022 Chun Zapata MD   ED visits in past 90 days: 0        Note composed:11:43 AM 08/22/2022

## 2022-08-22 NOTE — TELEPHONE ENCOUNTER
No new care gaps identified.  [facility] Embedded Care Gaps. Reference number: [id number]. 8/21/2022   11:05:07 PM CDT

## 2022-08-22 NOTE — TELEPHONE ENCOUNTER
Refill Routing Note   Medication(s) are not appropriate for processing by [facility] for the following reason(s):      - Outside of protocol  - Required vitals are abnormal    [facility] action(s):  Route          Medication reconciliation completed: No     Appointments  past 12m or future 3m with PCP    Date Provider   Last Visit   6/20/2022 Chun Zapata MD   Next Visit   Visit date not found Chun Zapata MD   ED visits in past 90 days: 0        Note composed:9:57 AM 08/22/2022

## 2022-08-22 NOTE — TELEPHONE ENCOUNTER
No new care gaps identified.  [facility] Embedded Care Gaps. Reference number: [id number]. 8/21/2022   11:03:26 PM CDT

## 2022-08-24 ENCOUNTER — PATIENT MESSAGE (OUTPATIENT)
Dept: PAIN MEDICINE | Facility: CLINIC | Age: 77
End: 2022-08-24
Payer: MEDICARE

## 2022-08-31 ENCOUNTER — SPECIALTY PHARMACY (OUTPATIENT)
Dept: PHARMACY | Facility: CLINIC | Age: 77
End: 2022-08-31
Payer: MEDICARE

## 2022-08-31 NOTE — TELEPHONE ENCOUNTER
Specialty Pharmacy - Refill Coordination    Specialty Medication Orders Linked to Encounter      Flowsheet Row Most Recent Value   Medication #1 evolocumab (REPATHA SURECLICK) 140 mg/mL PnIj (Order#[id number], Rx#[id number])            Refill Questions - Documented Responses      Flowsheet Row Most Recent Value   Patient Availability and HIPAA Verification    Does patient want to proceed with activity? Yes   HIPAA/medical authority confirmed? Yes   Relationship to patient of person spoken to? Child   Refill Screening Questions    Would patient like to speak to a pharmacist? No   When does the patient need to receive the medication? 09/01/22   Refill Delivery Questions    How will the patient receive the medication? Pickup   When does the patient need to receive the medication? 09/01/22   Shipping Address Home   Address in [facility] confirmed and updated if neccessary? Yes   Expected Copay ($) 4   Is the patient able to afford the medication copay? Yes   Payment Method at pickup   Days supply of Refill 28   Supplies needed? No supplies needed   Refill activity completed? Yes   Refill activity plan Refill scheduled   Shipment/Pickup Date: 09/01/22            Current Outpatient Medications   Medication Sig    acetaminophen (TYLENOL) 500 MG tablet Take 2 tablets (1,000 mg total) by mouth every 8 (eight) hours as needed for Pain.    allopurinoL (ZYLOPRIM) 300 MG tablet Take 1 tablet (300 mg total) by mouth once daily.    aspirin (ECOTRIN) 81 MG EC tablet Take 1 tablet (81 mg total) by mouth once daily.    blood sugar diagnostic Strp 1 each by Misc.(Non-Drug; Combo Route) route 4 (four) times daily.    blood-glucose meter Misc Follow package directions (Patient taking differently: Follow package directions)    blood-glucose meter,continuous (DEXCOM G6 ) Misc 1 each by Misc.(Non-Drug; Combo Route) route continuous prn.    blood-glucose sensor (DEXCOM G6 SENSOR) Nicole 3 each by Misc.(Non-Drug; Combo Route)  route continuous prn. Change every 10 days.    blood-glucose transmitter (DEXCOM G6 TRANSMITTER) Nicole 1 each by Misc.(Non-Drug; Combo Route) route continuous prn.    cloNIDine (CATAPRES) 0.1 MG tablet Take 1 tablet (0.1 mg total) by mouth 2 (two) times daily.    clopidogreL (PLAVIX) 75 mg tablet Take 1 tablet (75 mg total) by mouth once daily.    evolocumab (REPATHA SURECLICK) 140 mg/mL PnIj Inject 1 mL (140 mg total) into the skin every 14 (fourteen) days.    fenofibrate micronized (LOFIBRA) 134 MG Cap Take 1 capsule (134 mg total) by mouth daily with breakfast.    furosemide (LASIX) 40 MG tablet Take 1 tablet (40 mg total) by mouth once daily.    HYDROcodone-acetaminophen (NORCO)  mg per tablet Take 1 tablet by mouth every 8 (eight) hours as needed for Pain.    insulin aspart U-100 (NOVOLOG) 100 unit/mL (3 mL) InPn pen Inject 12 Units into the skin 3 (three) times daily with meals. Plus correction scale. Max TDD 40units.    insulin detemir U-100 (LEVEMIR FLEXTOUCH U-100 INSULN) 100 unit/mL (3 mL) InPn pen Inject 20 Units into the skin 2 (two) times daily.    ipratropium-albuteroL (COMBIVENT)  mcg/actuation inhaler Inhale 1 puff into the lungs by mouth every 6 (six) hours.    losartan (COZAAR) 50 MG tablet Take 1 tablet (50 mg total) by mouth once daily.    methocarbamoL (ROBAXIN) 500 MG Tab Take 1 tablet (500 mg total) by mouth 3 (three) times daily as needed (muscle spasm).    metoclopramide HCl (REGLAN) 5 MG tablet Take 1 tablet (5 mg total) by mouth 4 (four) times daily as needed (nausea, prevention).    metoprolol succinate (TOPROL-XL) 50 MG 24 hr tablet Take 1 tablet (50 mg total) by mouth once daily.    NIFEdipine (PROCARDIA-XL) 30 MG (OSM) 24 hr tablet Take 1 tablet (30 mg total) by mouth 2 (two) times a day.    nitroGLYCERIN (NITROSTAT) 0.4 MG SL tablet Place 1 tablet (0.4 mg total) under the tongue every 5 (five) minutes as needed for Chest pain.    pantoprazole (PROTONIX) 40 MG tablet Take 1  "tablet (40 mg total) by mouth once daily.    pen needle, diabetic (BD ULTRA-FINE HIEN PEN NEEDLE) 32 gauge x 5/32" Ndle 1 each by Misc.(Non-Drug; Combo Route) route 4 (four) times daily.    pregabalin (LYRICA) 150 MG capsule Take 1 capsule (150 mg total) by mouth 3 (three) times daily.    senna-docusate 8.6-50 mg (PERICOLACE) 8.6-50 mg per tablet Take 1 tablet by mouth 2 (two) times daily as needed for Constipation.    sertraline (ZOLOFT) 100 MG tablet Take 1 tablet (100 mg total) by mouth once daily.    triamcinolone acetonide 0.1% (KENALOG) 0.1 % cream Apply to affected areas of body twice daily as needed rash. Do not use on face, underarms, or groin.    TRUEPLUS LANCETS 30 gauge Misc TEST AS DIRECTED FOUR TIMES DAILY   Last reviewed on 8/12/2022  8:15 AM by Colin Gibbs NP    Review of patient's allergies indicates:   Allergen Reactions    Bleach (sodium hypochlorite) Shortness Of Breath    Nitrofurantoin macrocrystalline Anaphylaxis    Lipitor [atorvastatin] Diarrhea and Rash    Nsaids (non-steroidal anti-inflammatory drug)     Pcn [penicillins]     Toradol [ketorolac]     Last reviewed on  8/22/2022 9:42 AM by Yamile Gamez      Tasks added this encounter   9/22/2022 - Refill Call (Auto Added)  9/2/2022 - Pickup Reminder   Tasks due within next 3 months   No tasks due.     Shruti Almaraz [facility]  [address] [place] [id number]  Phone: [phone]  Fax: [phone]        "

## 2022-09-02 NOTE — TELEPHONE ENCOUNTER
Outgoing call for pickup reminder but daughter states they cannot make it in and requested rx to be mailed instead. Verified address is the home listed on [address] and daughter instructed to use the ccof ending in [id number]. Informed fulfillment team to switch [facility] from pickup to ship for 9/7 delivery.

## 2022-09-07 ENCOUNTER — TELEPHONE (OUTPATIENT)
Dept: PAIN MEDICINE | Facility: CLINIC | Age: 77
End: 2022-09-07
Payer: MEDICARE

## 2022-09-07 NOTE — TELEPHONE ENCOUNTER
This message is for patient in regards to his/her appointment 09/08/22 at 2:20p  With THAD Cherry.      For the safety of all patients and staff members. We are requesting during this visit that all patients and visitors to wear required face mask at all times here at [facility].     If you have any questions or concerns please contact [phone]     Pt daughter verbalized understanding and has confirmed appt

## 2022-09-08 ENCOUNTER — OFFICE VISIT (OUTPATIENT)
Dept: PAIN MEDICINE | Facility: CLINIC | Age: 77
End: 2022-09-08
Payer: MEDICARE

## 2022-09-08 VITALS — RESPIRATION RATE: 18 BRPM | TEMPERATURE: 99 F | WEIGHT: 187.38 LBS | HEIGHT: 60 IN | BODY MASS INDEX: 36.79 KG/M2

## 2022-09-08 DIAGNOSIS — M79.2 NEUROPATHIC PAIN: ICD-10-CM

## 2022-09-08 DIAGNOSIS — E08.42 DIABETIC POLYNEUROPATHY ASSOCIATED WITH DIABETES MELLITUS DUE TO UNDERLYING CONDITION: ICD-10-CM

## 2022-09-08 DIAGNOSIS — M54.15 RADICULOPATHY OF THORACOLUMBAR REGION: ICD-10-CM

## 2022-09-08 DIAGNOSIS — E11.40 PAINFUL DIABETIC NEUROPATHY: ICD-10-CM

## 2022-09-08 DIAGNOSIS — E13.42 POLYNEUROPATHY DUE TO SECONDARY DIABETES: Primary | ICD-10-CM

## 2022-09-08 DIAGNOSIS — G89.4 CHRONIC PAIN DISORDER: ICD-10-CM

## 2022-09-08 PROCEDURE — 99999 PR PBB SHADOW E&M-EST. PATIENT-LVL IV: ICD-10-PCS | Mod: PBBFAC,,, | Performed by: NURSE PRACTITIONER

## 2022-09-08 PROCEDURE — 99214 OFFICE O/P EST MOD 30 MIN: CPT | Mod: PBBFAC | Performed by: NURSE PRACTITIONER

## 2022-09-08 PROCEDURE — 99215 OFFICE O/P EST HI 40 MIN: CPT | Mod: S$PBB,,, | Performed by: NURSE PRACTITIONER

## 2022-09-08 PROCEDURE — 99999 PR PBB SHADOW E&M-EST. PATIENT-LVL IV: CPT | Mod: PBBFAC,,, | Performed by: NURSE PRACTITIONER

## 2022-09-08 PROCEDURE — 99215 PR OFFICE/OUTPT VISIT, EST, LEVL V, 40-54 MIN: ICD-10-PCS | Mod: S$PBB,,, | Performed by: NURSE PRACTITIONER

## 2022-09-08 RX ADMIN — CAPSAICIN 4 PATCH: KIT at 02:09

## 2022-09-12 DIAGNOSIS — G89.4 CHRONIC PAIN SYNDROME: ICD-10-CM

## 2022-09-12 DIAGNOSIS — E08.42 DIABETIC POLYNEUROPATHY ASSOCIATED WITH DIABETES MELLITUS DUE TO UNDERLYING CONDITION: ICD-10-CM

## 2022-09-12 DIAGNOSIS — G89.4 CHRONIC PAIN DISORDER: ICD-10-CM

## 2022-09-12 NOTE — PROGRESS NOTES
Chronic patient Established Note (Follow up visit)    Interval History 9/8/2022:  Mrs Waters presents for follow up of chronic lower back painn and radicular pain in conjunction with PDN to bilateral feet. She is doing fair with medication mgt of Norco, Robaxin, and Lyrica and does need refill at this time. She denies SE of medications. She is patiently awaiting her A1C to become lower than 10 to proceed with SCS trial.     Interval History 8/12/2022:  Mrs Waters presents for delayed FU. She has had continuous pain to lumbar and radicular pain but also peripheral neuropathy complaints. Over interval she has had CVA but doing fair. Her A1C has unfortunately elevated above 10 at this time and SCS trial placed on hold but phychiatric evaluation complete. She continues to take Norco 10/325mg TID, Robaxin 500mg TID and Lyrica 150mg TID with some benefit and denies SE of medications. No s/s concerning for cauda equina.     Interval History 4/12/2022:  Patient returns to clinic today for follow-up. Her neuropathic pain continues to be an issue for her, but she says that she is able to manage. She is taking Norco 10-325mg TID, Robaxin 500mg TID, and Lyrica 150mg TID without any adverse side effects. She denies any changes in her symptoms. She would like to proceed with SCS trial. Discussed last time that her HbA1c should be <10, was 9.8 on most recent labs. Seen by psychology and cleared for SCS.     Interval History 2/14/2022:  Mrs Waters presents for follow up. Pt states overall neuropathy continues. Since last visit she has been stable with medication mgt and takign Norco 10/325mg TID, Robaxin 500mg TID and Lyrica 150mg TID. She denies new areas of pain or neurological changes. Medication adequate to control pain without adverse SE.      Interval History 12/21/2021:  Pt presents for urgent evaluation s/p fall in kitchen last night. Pt unsure of LOC or head trauma but states some amnesia of events. Pt is having left knee  pain, B ankle pain L>R and lower back/buttock pain. Daughter further states tremor like activity last night. During visit daughter asked for bedside commode due to inability to ambulate.  Pt during visit appeared somnolent, pale and began vomiting. Discussed going to ER for further evaluation.      Interval History 12/14/2021:  Mrs Waters presents for follow up of chronic pain complaints. She is hopeful she will have A1C lower soon to move forward with SCS. She is doing fair with medication mgt alone at this time and denies SE of medications. Pt is currently taking Norco 10/325mg TID PRN, Lyrica 150mg TID, and Robaxin 500mg TID. She denies new areas of pain or neurological changes.      Interval History 10/14/2021:  The Pt presents for follow up and s/p B Lumbar sympathetic blocks. Pt states this has done well but ready to proceed with SCS trial. She is aware A1C must be under tight control and Pt and daughter re-iterate knowing this. Pt also mentions DKA admission and diagnosis of vasculitis as well over interval. Pt continues to take hydrocodone 10/325 mg TID PRN, Lyrica 150 mg TID, and Robaxin 500mg TID. She denies any SE of medication, denies new neurological changes.      Interval Hx 09/16/2021  Indira Waters presents to the clinic for a follow-up appointment for f/u after bilateral lumbar sympathetic block on 8/25/21.Patient reports >50% relief after lumbar sympathetic block that lasted 2 weeks when she then developed a UTI/DKA, was admitted to the hospital and pain recurred.  Current pain intensity is 8/10.     Interval History 8/12/2021:  Indira Waters presents to the clinic for a follow-up appointment for BLE diabetic neuropathy, painful. Continues with Norco 10/325mg, Lyrica 150mg TID, Robaxin 750mg daily PRN. She had to cancel previously scheduled lumbar sympathetic block 2/2 lithotripsy for painful kidney stones, which have now passed. She still has intermittent pain with urination but  "overall that pain is improving. She had to cancel previous angiogram for this same reason - this has not been rescheduled yet. She denies any change in her LE pain. Denies any new neurological sxs in BLEs.     Interval History 6/10/21:  Indira Waters presents to the clinic for a 2 week follow-up appointment from lumbar sympathetic nerve block in early May. She reports minimal relief from this intervention, but did note that it helped some, briefly. Since the last visit, Indira Waters states the pain has been persistant. Current pain intensity is 10/10. Patient has chronic generalized diffuse pain, most pronounced in her BL lower extremities 2/2 diabetic neuropathy. HSe states that the pain is a little better than at her last visit. Most days are 10/10, but some days are better than others. Her pain is aggravated by exertion, walking, or sitting/standing for extended periods of time. He pain is mildly improved by rest and medications. She is currently taking Lyrica 150 PO TID, Zoloft, and Norco 10-325mg TID PRN. She states that the pain meds are helping but she is still constantly in pain and unable to participate in her ADLs. She has now established care with PCP for assistance w/ poorly controlled T2DM, last A1c 11.4. She has not yet established care w/ Rheumatology for fibromyalgia management.    Interval HPI 4/15/21:  Patient returns for follow up of chronic generalized diffuse pain. At the last visit, had EMG (not yet completed), lumbar and hip x-ray imaging ordered. She was also referred to Rheumatology for fibromyalgia management and referral to PCP for DM2 management and weaning of zoloft for transition to cymbalta for treatment of neuropathy. She had her Lyrica increased to 150mg PO TID, and prescribed Norco 10mg TID with opioid contract signed. She has been taking Norco 10mg TID and it has been helping her "bad" pains but does not take all of her pain away. She has not yet been set up with her new PCP or " rheumatologist yet for fibromyalgia. Still continues to have generalized pain everywhere including feet, hips, legs, lower and upper back, neck and shoulder pain. Continues to have neuropathy in the bilateral lower extremities due to uncontrolled DM2.    Initial Visit 3/11/21:  Indira Waters presents to the clinic for the evaluation of chronic pain. Complaining of pain everywhere including feet, legs, hips, lower and upper back, neck, shoulder. Pain started 5+ years ago. Pain 10/10 at worse. She was referred here by her PCP Dr. Ramos who she has been seeing for over 6 years. She states her pain is aggravated by any physical activity/movement. She takes lyrica, robaxin, and Norco 10 TID with mild relief of pain. Per patient, she was referred here by her PCP for medication refill. She has not tried physical therapy for several years, she states it did not help last time she was in PT. She says she is trying to exercise daily by doing yoga. She walks with a walker. She has numerous comorbidities including DM2 (Last A1C 9+ per prior family medicine note in Jan 2021), fibromyalgia, lupus, DDD. She states she would like to find a new PCP as she no longer lives near her old one. Patient denies night fever/night sweats, urinary incontinence, bowel incontinence and significant weight loss.      Pain Disability Index Review:  Last 3 PDI Scores 9/8/2022 8/11/2022 4/14/2022   Pain Disability Index (PDI) 35 40 50     Pain Medications:  Norco 10-325mg TID, Robaxin 500mg TID, and Lyrica 150mg TID    Opioid Contract: yes     report:  Reviewed and consistent with medication use as prescribed.    Pain Procedures:    - reports possible back injection 10+ years ago  - Lumbar sympathetic nerve block 05/05/21 - Dr. Pereira - minimal relief    Physical Therapy/Home Exercise: no     Imaging:   Hip X-ray 4/7/21  FINDINGS:  Osseous structures appear intact without evidence of fracture or osseous destructive process.  No apparent  dislocation.     Modest degenerative change or significant joint space narrowing.     Lumbar X-ray 4/7/21  FINDINGS:  Diffuse bony demineralization.  Vertebral bodies are normal in height without evidence of fracture.  No pars defects.     Normal sagittal alignment is preserved.  No spondylolisthesis. No abnormal translation with flexion and extension.     Intervertebral disc heights are well maintained.  Mild facet arthropathy in the lower lumbar spine.     Mild scattered vascular calcification.     Impression:     No evidence of fracture or malalignment.     Mild facet arthropathy in the lower lumbar spine.     Diffuse bony demineralization.  Consider correlation with DEXA.    Allergies:   Review of patient's allergies indicates:   Allergen Reactions    Bleach (sodium hypochlorite) Shortness Of Breath    Nitrofurantoin macrocrystalline Anaphylaxis    Lipitor [atorvastatin] Diarrhea and Rash    Nsaids (non-steroidal anti-inflammatory drug)     Pcn [penicillins]     Toradol [ketorolac]        Current Medications:   Current Outpatient Medications   Medication Sig Dispense Refill    acetaminophen (TYLENOL) 500 MG tablet Take 2 tablets (1,000 mg total) by mouth every 8 (eight) hours as needed for Pain.  0    allopurinoL (ZYLOPRIM) 300 MG tablet Take 1 tablet (300 mg total) by mouth once daily. 90 tablet 1    aspirin (ECOTRIN) 81 MG EC tablet Take 1 tablet (81 mg total) by mouth once daily. 30 tablet 0    blood sugar diagnostic Strp 1 each by Misc.(Non-Drug; Combo Route) route 4 (four) times daily. 200 each 0    blood-glucose meter Misc Follow package directions (Patient taking differently: Follow package directions) 1 each 0    blood-glucose meter,continuous (DEXCOM G6 ) Misc 1 each by Misc.(Non-Drug; Combo Route) route continuous prn. 1 each PRN    blood-glucose sensor (DEXCOM G6 SENSOR) Nicole 3 each by Misc.(Non-Drug; Combo Route) route continuous prn. Change every 10 days. 3 each PRN    blood-glucose  "transmitter (DEXCOM G6 TRANSMITTER) Nicole 1 each by Misc.(Non-Drug; Combo Route) route continuous prn. 1 each PRN    cloNIDine (CATAPRES) 0.1 MG tablet Take 1 tablet (0.1 mg total) by mouth 2 (two) times daily. 180 tablet 0    clopidogreL (PLAVIX) 75 mg tablet Take 1 tablet (75 mg total) by mouth once daily. 30 tablet 11    evolocumab (REPATHA SURECLICK) 140 mg/mL PnIj Inject 1 mL (140 mg total) into the skin every 14 (fourteen) days. 8 each 3    fenofibrate micronized (LOFIBRA) 134 MG Cap Take 1 capsule (134 mg total) by mouth daily with breakfast. 90 capsule 3    furosemide (LASIX) 40 MG tablet Take 1 tablet (40 mg total) by mouth once daily. 30 tablet 11    insulin aspart U-100 (NOVOLOG) 100 unit/mL (3 mL) InPn pen Inject 12 Units into the skin 3 (three) times daily with meals. Plus correction scale. Max TDD 40units. 15 mL 3    insulin detemir U-100 (LEVEMIR FLEXTOUCH U-100 INSULN) 100 unit/mL (3 mL) InPn pen Inject 20 Units into the skin 2 (two) times daily. 15 mL 3    ipratropium-albuteroL (COMBIVENT)  mcg/actuation inhaler Inhale 1 puff into the lungs by mouth every 6 (six) hours. 4 g 0    metoclopramide HCl (REGLAN) 5 MG tablet Take 1 tablet (5 mg total) by mouth 4 (four) times daily as needed (nausea, prevention). 30 tablet 3    metoprolol succinate (TOPROL-XL) 50 MG 24 hr tablet Take 1 tablet (50 mg total) by mouth once daily. 90 tablet 0    nitroGLYCERIN (NITROSTAT) 0.4 MG SL tablet Place 1 tablet (0.4 mg total) under the tongue every 5 (five) minutes as needed for Chest pain. 25 tablet 0    pantoprazole (PROTONIX) 40 MG tablet Take 1 tablet (40 mg total) by mouth once daily. 30 tablet 0    pen needle, diabetic (BD ULTRA-FINE HIEN PEN NEEDLE) 32 gauge x 5/32" Ndle 1 each by Misc.(Non-Drug; Combo Route) route 4 (four) times daily. 400 each 1    pregabalin (LYRICA) 150 MG capsule Take 1 capsule (150 mg total) by mouth 3 (three) times daily. 90 capsule 2    senna-docusate 8.6-50 mg (PERICOLACE) 8.6-50 mg " per tablet Take 1 tablet by mouth 2 (two) times daily as needed for Constipation. 60 tablet 0    sertraline (ZOLOFT) 100 MG tablet Take 1 tablet (100 mg total) by mouth once daily. 90 tablet 3    triamcinolone acetonide 0.1% (KENALOG) 0.1 % cream Apply to affected areas of body twice daily as needed rash. Do not use on face, underarms, or groin. 454 g 0    TRUEPLUS LANCETS 30 gauge Misc TEST AS DIRECTED FOUR TIMES DAILY 200 each 2    HYDROcodone-acetaminophen (NORCO)  mg per tablet Take 1 tablet by mouth every 8 (eight) hours as needed for Pain. 90 tablet 0    losartan (COZAAR) 50 MG tablet Take 1 tablet (50 mg total) by mouth once daily. 90 tablet 0    NIFEdipine (PROCARDIA-XL) 30 MG (OSM) 24 hr tablet Take 1 tablet (30 mg total) by mouth 2 (two) times a day. 180 tablet 0     Current Facility-Administered Medications   Medication Dose Route Frequency Provider Last Rate Last Admin    capsaicin-skin cleanser 8 % patch Kit 2 patch  2 patch Topical (Top) 1 time in Clinic/HOD Colin Gibbs NP           REVIEW OF SYSTEMS:    GENERAL:  No weight loss, malaise or fevers.  HEENT:  Negative for frequent or significant headaches.  NECK:  Negative for lumps, goiter, pain and significant neck swelling.  RESPIRATORY:  Negative for cough, wheezing or shortness of breath.  CARDIOVASCULAR:  Negative for chest pain, leg swelling or palpitations.  GI:  Negative for abdominal discomfort, blood in stools or black stools or change in bowel habits.  MUSCULOSKELETAL:  See HPI.  SKIN:  Negative for lesions, rash, and itching.  PSYCH:  Negative for sleep disturbance, mood disorder and recent psychosocial stressors.  HEMATOLOGY/LYMPHOLOGY:  Negative for prolonged bleeding, bruising easily or swollen nodes.  NEURO:   No history of headaches, syncope, paralysis, seizures or tremors.  All other reviewed and negative other than HPI.    Past Medical History:  Past Medical History:   Diagnosis Date    Arthritis     Back pain     Cancer      ovarian    Depression     Diabetes mellitus     Fibromyalgia     Hyperlipidemia     Hypertension     Lupus     Stroke     slight left sided weakness       Past Surgical History:  Past Surgical History:   Procedure Laterality Date    APPENDECTOMY       SECTION      CORONARY ANGIOGRAPHY N/A 2022    Procedure: ANGIOGRAM, CORONARY ARTERY;  Surgeon: Jose L Méndez MD;  Location: [facility] CATH LAB;  Service: Cardiology;  Laterality: N/A;    HYSTERECTOMY      INJECTION OF ANESTHETIC AGENT AROUND NERVE Bilateral 2021    Procedure: BLOCK, NERVE, SYMPATHIC;  Surgeon: Holden Pereira MD;  Location: [facility] PAIN MGT;  Service: Pain Management;  Laterality: Bilateral;    INJECTION OF ANESTHETIC AGENT AROUND NERVE N/A 2021    Procedure: BLOCK, NERVE, SYMPATHETIC  need consent;  Surgeon: Holden Pereira MD;  Location: [facility] PAIN MGT;  Service: Pain Management;  Laterality: N/A;    MI EVAL,SWALLOW FUNCTION,CINE/VIDEO RECORD  2021         TONSILLECTOMY         Family History:  Family History   Problem Relation Age of Onset    COPD Mother     Lupus Mother     Hernia Mother     Uterine cancer Mother         vs cervical cancer    Ovarian cancer Mother     Diabetes Father     Coronary artery disease Father     Colon cancer Maternal Grandmother         in her 50's       Social History:  Social History     Socioeconomic History    Marital status:    Tobacco Use    Smoking status: Former     Types: Cigarettes     Quit date: 2020     Years since quittin.8    Smokeless tobacco: Never   Substance and Sexual Activity    Alcohol use: No    Drug use: No    Sexual activity: Not Currently     Social Determinants of Health     Financial Resource Strain: Low Risk     Difficulty of Paying Living Expenses: Not hard at all   Food Insecurity: No Food Insecurity    Worried About Running Out of Food in the Last Year: Never true    Ran Out of Food in the Last Year: Never true   Transportation Needs: No  Transportation Needs    Lack of Transportation (Medical): No    Lack of Transportation (Non-Medical): No   Physical Activity: Insufficiently Active    Days of Exercise per Week: 3 days    Minutes of Exercise per Session: 20 min   Stress: No Stress Concern Present    Feeling of Stress : Not at all   Social Connections: Socially Isolated    Frequency of Communication with Friends and Family: More than three times a week    Frequency of Social Gatherings with Friends and Family: Never    Attends Zoroastrian Services: Never    Active Member of Clubs or Organizations: No    Attends Club or Organization Meetings: Never    Marital Status:    Housing Stability: Low Risk     Unable to Pay for Housing in the Last Year: No    Number of Places Lived in the Last Year: 1    Unstable Housing in the Last Year: No       OBJECTIVE:    Temp 99 °F (37.2 °C)   Resp 18   Ht 5' (1.524 m)   Wt 85 kg (187 lb 6.3 oz)   BMI 36.60 kg/m²     PHYSICAL EXAMINATION:    General appearance: Well appearing, in no acute distress, alert and oriented x3.  Psych:  Mood and affect appropriate.  Skin: Skin color, texture, turgor normal, no rashes or lesions, in both upper and lower body.  Head/face:  Atraumatic, normocephalic. No palpable lymph nodes  Cor: RRR  Pulm: Non-labored  Back: Straight leg raising in the sitting and supine positions is negative to radicular pain. No pain to palpation over the spine or costovertebral angles.  Extremities: Peripheral joint ROM is full and pain free without obvious instability or laxity in all four extremities. No deformities, edema, or skin discoloration. Good capillary refill.  Musculoskeletal: Bilateral upper and lower extremity strength is normal and symmetric.  No atrophy or tone abnormalities are noted.  Neuro: Muscle stretch reflexes are diminished but symmetric. Altered sensation in BLE at baseline. Painful paresthesias in BLE.   Gait: Antalgic, uses RW for ambulation    ASSESSMENT: 77 y.o. year old  female with chronic pain, consistent with:     No diagnosis found.      PLAN:     - I have stressed the importance of physical activity and a home exercise plan to help with pain and improve health.  - Patient can continue with medications for now since they are providing benefits, using them appropriately, and without side effects.  - Psych evaluation complete but now waiting till A1C can drop below 10 again  - Qutenza patch to address PDN placed to B feet today  - When  HbA1c becomes <10, will proceed with SCS trial with Nevro for PDN  - UDS 2/14/22 reviewed.   - Continue Robaxin 500mg TID #90  - Refilled Norco 10/325mg TID #90 Last fill 7/13/2022  - Continue Lyrica 150mg TID #90  - RTC 4 weeks for re-evaluation of symptoms.   - Counseled patient regarding the importance of activity modification, constant sleeping habits and physical therapy.    The above plan and management options were discussed at length with patient. Patient is in agreement with the above and verbalized understanding.    Colin Gibbs NP  8/11/2022    I spent a total of 55 minutes on the day of the visit.  This includes face to face time and non-face to face time preparing to see the patient by reviewing previous labs/imaging, obtaining and/or reviewing separately obtained history, documenting clinical information in the electronic or other health record, independently interpreting results and communicating results to the patient/family/caregiver.    PATIENT NAME: Indira Waters    MRN:[id number]     DATE OF PROCEDURE:9/8    Diagnosis: Painful Diabetic Neuropathy E13.42     Postprocedural Diagnosis: Same     Complications: None     Informed Consent:  The patient's condition and proposed procedures, risks (including complications of nerve damage, skin irritation, infection, and failure of pain relief), and alternatives were discussed with the patient or responsible party.  The patient's/responsible party's questions were answered.  The  patient/responsible party appeared to understand and chose to proceed.       Procedural Pause:  A procedural pause verifying correct patient, medical record number, allergies, medications to be administered, current vital signs, and proper application site was performed immediately prior to beginning the procedure.     Procedure in Detail:  The patient was placed in a supine position. 2 patches were used for the procedure today.  The patches were applied to the target area and secured with tape.  A coban was used to further secure the patches in place.  The patient was allowed to rest in a comfortable position, and monitored by staff every 10-15 minutes to ensure comfort. The patches remained in place for 30 minutes.  The patches were then removed and the cleaning gel was applied and allowed to sit for an additional 60 seconds, after which the area was wiped clean.      The patient was then discharged in stable condition.        DISCUSSION:  A Qutenza patch was applied today with the purpose of treating the patients nerve pain. They were informed that they may have some burning of the skin for a few days, and should not take a hot shower for 2-3 days as that may irritate the area.  They were informed that the area may feel like they had a sunburn. They were informed that it can take about 2-4 weeks for the effects of the patch to be fully realized.    I will see the patient for follow up in 1 month.    Plan to repeat every 91 days.

## 2022-09-13 RX ORDER — METHOCARBAMOL 500 MG/1
500 TABLET, FILM COATED ORAL 3 TIMES DAILY PRN
Qty: 90 TABLET | Refills: 0 | Status: SHIPPED | OUTPATIENT
Start: 2022-09-13 | End: 2022-10-12 | Stop reason: SDUPTHER

## 2022-09-13 RX ORDER — PREGABALIN 150 MG/1
150 CAPSULE ORAL 3 TIMES DAILY
Qty: 90 CAPSULE | Refills: 2 | Status: SHIPPED | OUTPATIENT
Start: 2022-09-13 | End: 2022-10-12 | Stop reason: SDUPTHER

## 2022-09-22 ENCOUNTER — SPECIALTY PHARMACY (OUTPATIENT)
Dept: PHARMACY | Facility: CLINIC | Age: 77
End: 2022-09-22
Payer: MEDICARE

## 2022-09-26 DIAGNOSIS — I20.89 ANGINA AT REST: ICD-10-CM

## 2022-09-26 DIAGNOSIS — I10 ESSENTIAL HYPERTENSION: ICD-10-CM

## 2022-09-26 RX ORDER — METOPROLOL SUCCINATE 100 MG/1
100 TABLET, EXTENDED RELEASE ORAL DAILY
Qty: 30 TABLET | Refills: 1 | Status: SHIPPED | OUTPATIENT
Start: 2022-09-26 | End: 2022-11-30 | Stop reason: SDUPTHER

## 2022-09-26 RX ORDER — NITROGLYCERIN 0.4 MG/1
0.4 TABLET SUBLINGUAL EVERY 5 MIN PRN
Qty: 25 TABLET | Refills: 5 | Status: SHIPPED | OUTPATIENT
Start: 2022-09-26 | End: 2023-05-01

## 2022-09-26 NOTE — TELEPHONE ENCOUNTER
Refill Routing Note   Medication(s) are not appropriate for processing by [facility] for the following reason(s):      - Required vitals are abnormal    [facility] action(s):  Defer  Approve       Medication Therapy Plan: Defer toprol - high BP. Approve nitrostat.  Medication reconciliation completed: No     Appointments  past 12m or future 3m with PCP    Date Provider   Last Visit   6/20/2022 Chun Zapata MD   Next Visit   Visit date not found Chun Zapata MD   ED visits in past 90 days: 0        Note composed:12:40 PM 09/26/2022

## 2022-09-26 NOTE — TELEPHONE ENCOUNTER
No new care gaps identified.  [facility] Embedded Care Gaps. Reference number: [id number]. 9/26/2022   9:59:42 AM GHISLAINET

## 2022-09-27 ENCOUNTER — HOSPITAL ENCOUNTER (EMERGENCY)
Facility: OTHER | Age: 77
Discharge: HOME | End: 2022-09-27
Attending: EMERGENCY MEDICINE
Payer: MEDICARE

## 2022-09-27 VITALS
SYSTOLIC BLOOD PRESSURE: 168 MMHG | WEIGHT: 182 LBS | RESPIRATION RATE: 19 BRPM | OXYGEN SATURATION: 95 % | HEIGHT: 60 IN | TEMPERATURE: 98 F | BODY MASS INDEX: 35.73 KG/M2 | HEART RATE: 70 BPM | DIASTOLIC BLOOD PRESSURE: 70 MMHG
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
## 2022-09-27 DIAGNOSIS — R04.2 HEMOPTYSIS: Primary | ICD-10-CM

## 2022-09-27 DIAGNOSIS — R07.81 RIB PAIN: ICD-10-CM

## 2022-09-27 LAB
ALBUMIN SERPL BCP-MCNC: 3.8 G/DL (ref 3.5–5.2)
ALP SERPL-CCNC: 80 U/L (ref 55–135)
ALT SERPL W/O P-5'-P-CCNC: 16 U/L (ref 10–44)
ANION GAP SERPL CALC-SCNC: 7 MMOL/L (ref 8–16)
AST SERPL-CCNC: 23 U/L (ref 10–40)
BASOPHILS # BLD AUTO: 0.06 K/UL (ref 0–0.2)
BASOPHILS NFR BLD: 0.5 % (ref 0–1.9)
BILIRUB SERPL-MCNC: 0.2 MG/DL (ref 0.1–1)
BUN SERPL-MCNC: 21 MG/DL (ref 8–23)
CALCIUM SERPL-MCNC: 9.3 MG/DL (ref 8.7–10.5)
CHLORIDE SERPL-SCNC: 106 MMOL/L (ref 95–110)
CO2 SERPL-SCNC: 25 MMOL/L (ref 23–29)
CREAT SERPL-MCNC: 1.3 MG/DL (ref 0.5–1.4)
DIFFERENTIAL METHOD: ABNORMAL
EOSINOPHIL # BLD AUTO: 0.3 K/UL (ref 0–0.5)
EOSINOPHIL NFR BLD: 2.9 % (ref 0–8)
ERYTHROCYTE [DISTWIDTH] IN BLOOD BY AUTOMATED COUNT: 15.3 % (ref 11.5–14.5)
EST. GFR  (NO RACE VARIABLE): 43 ML/MIN/1.73 M^2
GLUCOSE SERPL-MCNC: 143 MG/DL (ref 70–110)
HCT VFR BLD AUTO: 34.1 % (ref 37–48.5)
HGB BLD-MCNC: 11.1 G/DL (ref 12–16)
IMM GRANULOCYTES # BLD AUTO: 0.07 K/UL (ref 0–0.04)
IMM GRANULOCYTES NFR BLD AUTO: 0.6 % (ref 0–0.5)
LYMPHOCYTES # BLD AUTO: 3.2 K/UL (ref 1–4.8)
LYMPHOCYTES NFR BLD: 27.5 % (ref 18–48)
MCH RBC QN AUTO: 28 PG (ref 27–31)
MCHC RBC AUTO-ENTMCNC: 32.6 G/DL (ref 32–36)
MCV RBC AUTO: 86 FL (ref 82–98)
MONOCYTES # BLD AUTO: 0.8 K/UL (ref 0.3–1)
MONOCYTES NFR BLD: 7 % (ref 4–15)
NEUTROPHILS # BLD AUTO: 7.2 K/UL (ref 1.8–7.7)
NEUTROPHILS NFR BLD: 61.5 % (ref 38–73)
NRBC BLD-RTO: 0 /100 WBC
PLATELET # BLD AUTO: 315 K/UL (ref 150–450)
PMV BLD AUTO: 11.4 FL (ref 9.2–12.9)
POTASSIUM SERPL-SCNC: 4.3 MMOL/L (ref 3.5–5.1)
PROT SERPL-MCNC: 7.5 G/DL (ref 6–8.4)
RBC # BLD AUTO: 3.96 M/UL (ref 4–5.4)
SODIUM SERPL-SCNC: 138 MMOL/L (ref 136–145)
WBC # BLD AUTO: 11.73 K/UL (ref 3.9–12.7)

## 2022-09-27 PROCEDURE — 85025 COMPLETE CBC W/AUTO DIFF WBC: CPT | Performed by: EMERGENCY MEDICINE

## 2022-09-27 PROCEDURE — 80053 COMPREHEN METABOLIC PANEL: CPT | Performed by: EMERGENCY MEDICINE

## 2022-09-27 PROCEDURE — 99284 EMERGENCY DEPT VISIT MOD MDM: CPT | Mod: 25

## 2022-09-27 NOTE — ED TRIAGE NOTES
Ptpresents to ED c/o coughing up  blood clot earlier today.Pt also  rep[orts BL rib pain s/p fall 2 weeks ago. Pt states she has not been evaluated for fall. Denies hitting head, LOC, SOB,recent illness.

## 2022-09-27 NOTE — FIRST PROVIDER EVALUATION
"Medical screening examination initiated.  I have conducted a focused provider triage encounter, findings are as follows:    Brief history of present illness: "blood clot in mouth after brushing false teeth." Did not cough up or vomit any blood. Only one small clot was noted. On blood thinner (plavix noted on med list).  Trip and fall 2 weeks ago injuring her bilateral ribs. Has had pain since then, worse in the last few days. Not better with narcotic pain medication.     Vitals:    09/27/22 1336   BP: (!) 145/67   BP Location: Left arm   Patient Position: Sitting   Pulse: 79   Resp: 16   SpO2: 96%   Weight: 82.6 kg (182 lb)   Height: 5' (1.524 m)       Pertinent physical exam:  In wheelchair. No distress.     Brief workup plan:  Chest xray.    Preliminary workup initiated; this workup will be continued and followed by the physician or advanced practice provider that is assigned to the patient when roomed.  "

## 2022-09-27 NOTE — ED PROVIDER NOTES
SCRIBE #1 NOTE: I, Aimee Mancilla, am scribing for, and in the presence of,  Duane Benson MD.     Source of History   The patient.     Chief Complaint   Hemoptysis (Reports coughing up a blood clot. )      History Of Present Illness   Indira Waters is a 77 y.o. female with PMHx of lupus, HTN, CVA, DM, cancer and HLD, presenting with 1 episode of hemoptysis this afternoon. She has no complaints otherwise. She denies cough prior to this episode. She notes BLE swelling, which is typical and no worse than usual. Of note, patient takes an anticoagulant daily.     Review Of Systems   As per HPI and below:  General: No fever.  No chills.  Eyes: No visual changes.  Head: No headache.    Integument: No rashes or lesions.   Chest: No shortness of breath. No cough. +Hemoptysis.    Cardiovascular: No chest pain.  Abdomen: No abdominal pain.  No nausea or vomiting.  Urinary: No abnormal urination.  Musculoskeletal: +BLE swelling.  Neurologic: No focal weakness.  No numbness.  Hematologic: No easy bruising.  Endocrine: No excessive thirst or urination.      Review of patient's allergies indicates:   Allergen Reactions    Bleach (sodium hypochlorite) Shortness Of Breath    Nitrofurantoin macrocrystalline Anaphylaxis    Lipitor [atorvastatin] Diarrhea and Rash    Nsaids (non-steroidal anti-inflammatory drug)     Pcn [penicillins]     Toradol [ketorolac]        No current facility-administered medications on file prior to encounter.     Current Outpatient Medications on File Prior to Encounter   Medication Sig Dispense Refill    acetaminophen (TYLENOL) 500 MG tablet Take 2 tablets (1,000 mg total) by mouth every 8 (eight) hours as needed for Pain.  0    allopurinoL (ZYLOPRIM) 300 MG tablet Take 1 tablet (300 mg total) by mouth once daily. 90 tablet 1    aspirin (ECOTRIN) 81 MG EC tablet Take 1 tablet (81 mg total) by mouth once daily. 30 tablet 0    blood sugar diagnostic Strp 1 each by Misc.(Non-Drug; Combo Route) route 4  (four) times daily. 200 each 0    blood-glucose meter Misc Follow package directions (Patient taking differently: Follow package directions) 1 each 0    blood-glucose meter,continuous (DEXCOM G6 ) Misc 1 each by Misc.(Non-Drug; Combo Route) route continuous prn. 1 each PRN    blood-glucose sensor (DEXCOM G6 SENSOR) Nicole 3 each by Misc.(Non-Drug; Combo Route) route continuous prn. Change every 10 days. 3 each PRN    blood-glucose transmitter (DEXCOM G6 TRANSMITTER) Nicole 1 each by Misc.(Non-Drug; Combo Route) route continuous prn. 1 each PRN    cloNIDine (CATAPRES) 0.1 MG tablet Take 1 tablet (0.1 mg total) by mouth 2 (two) times daily. 180 tablet 0    clopidogreL (PLAVIX) 75 mg tablet Take 1 tablet (75 mg total) by mouth once daily. 30 tablet 11    evolocumab (REPATHA SURECLICK) 140 mg/mL PnIj Inject 1 mL (140 mg total) into the skin every 14 (fourteen) days. 8 each 3    fenofibrate micronized (LOFIBRA) 134 MG Cap Take 1 capsule (134 mg total) by mouth daily with breakfast. 90 capsule 3    furosemide (LASIX) 40 MG tablet Take 1 tablet (40 mg total) by mouth once daily. 30 tablet 11    HYDROcodone-acetaminophen (NORCO)  mg per tablet Take 1 tablet by mouth every 8 (eight) hours as needed for Pain. 90 tablet 0    insulin aspart U-100 (NOVOLOG) 100 unit/mL (3 mL) InPn pen Inject 12 Units into the skin 3 (three) times daily with meals. Plus correction scale. Max TDD 40units. 15 mL 3    insulin detemir U-100 (LEVEMIR FLEXTOUCH U-100 INSULN) 100 unit/mL (3 mL) InPn pen Inject 20 Units into the skin 2 (two) times daily. 15 mL 3    ipratropium-albuteroL (COMBIVENT)  mcg/actuation inhaler Inhale 1 puff into the lungs by mouth every 6 (six) hours. 4 g 0    losartan (COZAAR) 50 MG tablet Take 1 tablet (50 mg total) by mouth once daily. 90 tablet 0    methocarbamoL (ROBAXIN) 500 MG Tab Take 1 tablet (500 mg total) by mouth 3 (three) times daily as needed (muscle spasm). 90 tablet 0    metoclopramide HCl  "(REGLAN) 5 MG tablet Take 1 tablet (5 mg total) by mouth 4 (four) times daily as needed (nausea, prevention). 30 tablet 3    metoprolol succinate (TOPROL-XL) 100 MG 24 hr tablet Take 1 tablet (100 mg total) by mouth once daily. 30 tablet 1    NIFEdipine (PROCARDIA-XL) 30 MG (OSM) 24 hr tablet Take 1 tablet (30 mg total) by mouth 2 (two) times a day. 180 tablet 0    nitroGLYCERIN (NITROSTAT) 0.4 MG SL tablet Place 1 tablet (0.4 mg total) under the tongue every 5 (five) minutes as needed for Chest pain. 25 tablet 5    pantoprazole (PROTONIX) 40 MG tablet Take 1 tablet (40 mg total) by mouth once daily. 30 tablet 0    pen needle, diabetic (BD ULTRA-FINE HIEN PEN NEEDLE) 32 gauge x 5/32" Ndle 1 each by Misc.(Non-Drug; Combo Route) route 4 (four) times daily. 400 each 1    pregabalin (LYRICA) 150 MG capsule Take 1 capsule (150 mg total) by mouth 3 (three) times daily. 90 capsule 2    senna-docusate 8.6-50 mg (PERICOLACE) 8.6-50 mg per tablet Take 1 tablet by mouth 2 (two) times daily as needed for Constipation. 60 tablet 0    sertraline (ZOLOFT) 100 MG tablet Take 1 tablet (100 mg total) by mouth once daily. 90 tablet 3    triamcinolone acetonide 0.1% (KENALOG) 0.1 % cream Apply to affected areas of body twice daily as needed rash. Do not use on face, underarms, or groin. 454 g 0    TRUEPLUS LANCETS 30 gauge Misc TEST AS DIRECTED FOUR TIMES DAILY 200 each 2       Past History   As per HPI and below:  Past Medical History:   Diagnosis Date    Arthritis     Back pain     Cancer     ovarian    Depression     Diabetes mellitus     Fibromyalgia     Hyperlipidemia     Hypertension     Lupus     Stroke     slight left sided weakness     Past Surgical History:   Procedure Laterality Date    APPENDECTOMY       SECTION      CORONARY ANGIOGRAPHY N/A 2022    Procedure: ANGIOGRAM, CORONARY ARTERY;  Surgeon: Jose L Méndez MD;  Location: [facility] CATH LAB;  Service: Cardiology;  Laterality: N/A;    HYSTERECTOMY      INJECTION " OF ANESTHETIC AGENT AROUND NERVE Bilateral 2021    Procedure: BLOCK, NERVE, SYMPATHIC;  Surgeon: Holden Pereira MD;  Location: [facility] PAIN MGT;  Service: Pain Management;  Laterality: Bilateral;    INJECTION OF ANESTHETIC AGENT AROUND NERVE N/A 2021    Procedure: BLOCK, NERVE, SYMPATHETIC  need consent;  Surgeon: Holden Pereira MD;  Location: [facility] PAIN MGT;  Service: Pain Management;  Laterality: N/A;    HI EVAL,SWALLOW FUNCTION,CINE/VIDEO RECORD  2021         TONSILLECTOMY         Social History     Socioeconomic History    Marital status:    Tobacco Use    Smoking status: Former     Types: Cigarettes     Quit date: 2020     Years since quittin.9    Smokeless tobacco: Never   Substance and Sexual Activity    Alcohol use: No    Drug use: No    Sexual activity: Not Currently     Social Determinants of Health     Financial Resource Strain: Low Risk     Difficulty of Paying Living Expenses: Not hard at all   Food Insecurity: No Food Insecurity    Worried About Running Out of Food in the Last Year: Never true    Ran Out of Food in the Last Year: Never true   Transportation Needs: No Transportation Needs    Lack of Transportation (Medical): No    Lack of Transportation (Non-Medical): No   Physical Activity: Insufficiently Active    Days of Exercise per Week: 3 days    Minutes of Exercise per Session: 20 min   Stress: No Stress Concern Present    Feeling of Stress : Not at all   Social Connections: Socially Isolated    Frequency of Communication with Friends and Family: More than three times a week    Frequency of Social Gatherings with Friends and Family: Never    Attends Rastafarian Services: Never    Active Member of Clubs or Organizations: No    Attends Club or Organization Meetings: Never    Marital Status:    Housing Stability: Low Risk     Unable to Pay for Housing in the Last Year: No    Number of Places Lived in the Last Year: 1    Unstable Housing in the Last Year: No        Family History   Problem Relation Age of Onset    COPD Mother     Lupus Mother     Hernia Mother     Uterine cancer Mother         vs cervical cancer    Ovarian cancer Mother     Diabetes Father     Coronary artery disease Father     Colon cancer Maternal Grandmother         in her 50's       Physical Exam     Vitals:    09/27/22 1336 09/27/22 1658 09/27/22 1702 09/27/22 1711   BP: (!) 145/67 (!) 174/80 (!) 168/70    BP Location: Left arm Left arm     Patient Position: Sitting Lying     Pulse: 79 70 69 70   Resp: 16 20  19   Temp:  98.3 °F (36.8 °C)     TempSrc:  Oral     SpO2: 96% 96% 95% 95%   Weight: 82.6 kg (182 lb)      Height: 5' (1.524 m)        Appearance: No acute distress.  Skin: No rashes seen.  Good turgor.  No abrasions.  No ecchymoses.  Eyes: No conjunctival injection.  ENT: Oropharynx clear.  Small area of bleeding on hard palate.  Chest: Clear to auscultation bilaterally.  Good air movement.  No wheezes.  No rhonchi.  Cardiovascular: Regular rate and rhythm.  No murmurs. No gallops. No rubs.  Abdomen: Soft.  Not distended.  Nontender.  No guarding.  No rebound.  Musculoskeletal: Good range of motion all joints.  No deformities.  Neck supple.  No meningismus. Trace BLE edema.   Neurologic: Motor intact.  Sensation intact.  Cerebellar intact.  Cranial nerves intact.  Mental Status:  Alert and oriented x 3.  Appropriate, conversant.      Initial MDM   Hemoptysis, 1 episode.  One very small clot.  There is a small area of bleeding on the hard palate which may be the source.  Will check chest x-ray and labs/    I decided to obtain the patient's medical records.    Medications - No data to display    Results and Course     Labs Reviewed   CBC W/ AUTO DIFFERENTIAL - Abnormal; Notable for the following components:       Result Value    RBC 3.96 (*)     Hemoglobin 11.1 (*)     Hematocrit 34.1 (*)     RDW 15.3 (*)     Immature Granulocytes 0.6 (*)     Immature Grans (Abs) 0.07 (*)     All other  components within normal limits   COMPREHENSIVE METABOLIC PANEL - Abnormal; Notable for the following components:    Glucose 143 (*)     Anion Gap 7 (*)     eGFR 43 (*)     All other components within normal limits       Imaging Results              X-Ray Chest PA And Lateral (Final result)  Result time 09/27/22 16:13:16      Final result by Giorgi Yeh MD (09/27/22 16:13:16)                   Impression:      No acute abnormality.      Electronically signed by: Giorgi Yeh MD  Date:    09/27/2022  Time:    16:13               Narrative:    EXAMINATION:  XR CHEST PA AND LATERAL    CLINICAL HISTORY:  Pleurodynia    TECHNIQUE:  PA and lateral views of the chest were performed.    COMPARISON:  05/05/2022    FINDINGS:  The lungs are clear, with normal appearance of pulmonary vasculature and no pleural effusion or pneumothorax.    The cardiac silhouette is normal in size. The hilar and mediastinal contours are unremarkable.    Bones are intact.                                      ED Course as of 09/27/22 1903   Tue Sep 27, 2022   1632 X-Ray Chest PA And Lateral  CXR shows no acute disease per my independent interpretation.     [DC]   1724 Platelets: 315 [DC]   1724 WBC: 11.73 [DC]   1724 Hemoglobin(!): 11.1 [DC]   1811 Creatinine: 1.3 [DC]      ED Course User Index  [DC] Duane Benson MD           MDM, Impression and Plan   77 y.o. female with hemoptysis, likely from small area of bleeding on the hard palate.  Chest x-ray is clear.  Labs are benign.  I highly doubt PE, mass.  No symptoms of bronchitis.  Recommend discharge with PCP follow-up.  Plavix is likely contributory.         Final diagnoses:  [W19.XXXA] Fall  [R07.81] Rib pain  [R04.2] Hemoptysis (Primary)        ED Disposition Condition    Discharge Stable          ED Prescriptions    None       Follow-up Information       Follow up With Specialties Details Why Contact Info    Chun Zapata MD Family Medicine Schedule an appointment as  soon as possible for a visit in 1 week  [address] [facility]  [address]  [facility] [address]  [phone]      [facility] Emergency Medicine  If symptoms worsen [address] [facility] [id number] [phone]            Scribe Attestation:   Scribe #1: I performed the above scribed service and the documentation accurately describes the services I performed. I attest to the accuracy of the note.    Physician Attestation for Scribe: I, Duane Benson, reviewed documentation as scribed in my presence, which is both accurate and complete.     Duane Benson MD  09/27/22 4718

## 2022-09-28 ENCOUNTER — TELEPHONE (OUTPATIENT)
Dept: INTERNAL MEDICINE | Facility: CLINIC | Age: 77
End: 2022-09-28
Payer: MEDICARE

## 2022-09-28 NOTE — TELEPHONE ENCOUNTER
----- Message from Chun Zapata MD sent at 9/28/2022  9:14 AM CDT -----  Ok to override schedule for sooner ER  f/u  thanks  ----- Message -----  From: Joel Soliman  Sent: 9/28/2022   8:50 AM CDT  To: Chun Zapata MD    Name of Who is Calling: CAYLA GOODEN [[id number]]            What is the request in detail: Patient is requesting call back to get sooner appointment for hospital f/u bc of blood clot in the mouth              Can the clinic reply by [facility]: no              What Number to Call Back if not in [facility]: [phone]

## 2022-09-29 ENCOUNTER — OFFICE VISIT (OUTPATIENT)
Dept: CARDIOLOGY | Facility: CLINIC | Age: 77
End: 2022-09-29
Payer: MEDICARE

## 2022-09-29 VITALS
OXYGEN SATURATION: 95 % | HEART RATE: 70 BPM | SYSTOLIC BLOOD PRESSURE: 158 MMHG | DIASTOLIC BLOOD PRESSURE: 78 MMHG | BODY MASS INDEX: 37.36 KG/M2 | WEIGHT: 190.31 LBS | HEIGHT: 60 IN

## 2022-09-29 DIAGNOSIS — I25.118 ATHEROSCLEROSIS OF NATIVE CORONARY ARTERY OF NATIVE HEART WITH STABLE ANGINA PECTORIS: Primary | ICD-10-CM

## 2022-09-29 DIAGNOSIS — E11.22 TYPE 2 DIABETES MELLITUS WITH STAGE 3A CHRONIC KIDNEY DISEASE, WITH LONG-TERM CURRENT USE OF INSULIN: ICD-10-CM

## 2022-09-29 DIAGNOSIS — E66.01 SEVERE OBESITY: ICD-10-CM

## 2022-09-29 DIAGNOSIS — Z79.4 TYPE 2 DIABETES MELLITUS WITH STAGE 3A CHRONIC KIDNEY DISEASE, WITH LONG-TERM CURRENT USE OF INSULIN: ICD-10-CM

## 2022-09-29 DIAGNOSIS — E78.00 HYPERCHOLESTEROLEMIA: ICD-10-CM

## 2022-09-29 DIAGNOSIS — I10 ESSENTIAL HYPERTENSION: ICD-10-CM

## 2022-09-29 DIAGNOSIS — N18.31 TYPE 2 DIABETES MELLITUS WITH STAGE 3A CHRONIC KIDNEY DISEASE, WITH LONG-TERM CURRENT USE OF INSULIN: ICD-10-CM

## 2022-09-29 PROCEDURE — 99215 OFFICE O/P EST HI 40 MIN: CPT | Mod: S$PBB,,, | Performed by: INTERNAL MEDICINE

## 2022-09-29 PROCEDURE — 99999 PR PBB SHADOW E&M-EST. PATIENT-LVL III: CPT | Mod: PBBFAC,,, | Performed by: INTERNAL MEDICINE

## 2022-09-29 PROCEDURE — 99999 PR PBB SHADOW E&M-EST. PATIENT-LVL III: ICD-10-PCS | Mod: PBBFAC,,, | Performed by: INTERNAL MEDICINE

## 2022-09-29 PROCEDURE — 99213 OFFICE O/P EST LOW 20 MIN: CPT | Mod: PBBFAC | Performed by: INTERNAL MEDICINE

## 2022-09-29 PROCEDURE — 99215 PR OFFICE/OUTPT VISIT, EST, LEVL V, 40-54 MIN: ICD-10-PCS | Mod: S$PBB,,, | Performed by: INTERNAL MEDICINE

## 2022-09-29 NOTE — PROGRESS NOTES
[facility]    Chief Complaint  Chief Complaint   Patient presents with    Coronary Artery Disease       HPI:    Went to the emergency department 2 days ago because she was coughing up blood clots.  Workup was unrevealing.  No problems since.  Also some confusion about metoprolol dosing.  Seems to have 50 mg pills, not 100 mg pills.  Has not had to take any nitroglycerin.  Angina is well controlled    Medications  Current Outpatient Medications   Medication Sig Dispense Refill    acetaminophen (TYLENOL) 500 MG tablet Take 2 tablets (1,000 mg total) by mouth every 8 (eight) hours as needed for Pain.  0    allopurinoL (ZYLOPRIM) 300 MG tablet Take 1 tablet (300 mg total) by mouth once daily. 90 tablet 1    aspirin (ECOTRIN) 81 MG EC tablet Take 1 tablet (81 mg total) by mouth once daily. 30 tablet 0    blood sugar diagnostic Strp 1 each by Misc.(Non-Drug; Combo Route) route 4 (four) times daily. 200 each 0    blood-glucose meter Misc Follow package directions (Patient taking differently: Follow package directions) 1 each 0    blood-glucose meter,continuous (DEXCOM G6 ) Misc 1 each by Misc.(Non-Drug; Combo Route) route continuous prn. 1 each PRN    blood-glucose sensor (DEXCOM G6 SENSOR) Nicole 3 each by Misc.(Non-Drug; Combo Route) route continuous prn. Change every 10 days. 3 each PRN    blood-glucose transmitter (DEXCOM G6 TRANSMITTER) Nicole 1 each by Misc.(Non-Drug; Combo Route) route continuous prn. 1 each PRN    cloNIDine (CATAPRES) 0.1 MG tablet Take 1 tablet (0.1 mg total) by mouth 2 (two) times daily. 180 tablet 0    evolocumab (REPATHA SURECLICK) 140 mg/mL PnIj Inject 1 mL (140 mg total) into the skin every 14 (fourteen) days. 8 each 3    fenofibrate micronized (LOFIBRA) 134 MG Cap Take 1 capsule (134 mg total) by mouth daily with breakfast. 90 capsule 3    furosemide (LASIX) 40 MG tablet Take 1 tablet (40 mg total) by mouth once daily. 30 tablet 11    HYDROcodone-acetaminophen (NORCO)  " mg per tablet Take 1 tablet by mouth every 8 (eight) hours as needed for Pain. 90 tablet 0    insulin aspart U-100 (NOVOLOG) 100 unit/mL (3 mL) InPn pen Inject 12 Units into the skin 3 (three) times daily with meals. Plus correction scale. Max TDD 40units. 15 mL 3    insulin detemir U-100 (LEVEMIR FLEXTOUCH U-100 INSULN) 100 unit/mL (3 mL) InPn pen Inject 20 Units into the skin 2 (two) times daily. 15 mL 3    ipratropium-albuteroL (COMBIVENT)  mcg/actuation inhaler Inhale 1 puff into the lungs by mouth every 6 (six) hours. 4 g 0    methocarbamoL (ROBAXIN) 500 MG Tab Take 1 tablet (500 mg total) by mouth 3 (three) times daily as needed (muscle spasm). 90 tablet 0    metoclopramide HCl (REGLAN) 5 MG tablet Take 1 tablet (5 mg total) by mouth 4 (four) times daily as needed (nausea, prevention). 30 tablet 3    metoprolol succinate (TOPROL-XL) 100 MG 24 hr tablet Take 1 tablet (100 mg total) by mouth once daily. 30 tablet 1    nitroGLYCERIN (NITROSTAT) 0.4 MG SL tablet Place 1 tablet (0.4 mg total) under the tongue every 5 (five) minutes as needed for Chest pain. 25 tablet 5    pantoprazole (PROTONIX) 40 MG tablet Take 1 tablet (40 mg total) by mouth once daily. 30 tablet 0    pen needle, diabetic (BD ULTRA-FINE HIEN PEN NEEDLE) 32 gauge x 5/32" Ndle 1 each by Misc.(Non-Drug; Combo Route) route 4 (four) times daily. 400 each 1    pregabalin (LYRICA) 150 MG capsule Take 1 capsule (150 mg total) by mouth 3 (three) times daily. 90 capsule 2    senna-docusate 8.6-50 mg (PERICOLACE) 8.6-50 mg per tablet Take 1 tablet by mouth 2 (two) times daily as needed for Constipation. 60 tablet 0    sertraline (ZOLOFT) 100 MG tablet Take 1 tablet (100 mg total) by mouth once daily. 90 tablet 3    triamcinolone acetonide 0.1% (KENALOG) 0.1 % cream Apply to affected areas of body twice daily as needed rash. Do not use on face, underarms, or groin. 454 g 0    TRUEPLUS LANCETS 30 gauge Misc TEST AS DIRECTED FOUR TIMES DAILY 200 " each 2    losartan (COZAAR) 50 MG tablet Take 1 tablet (50 mg total) by mouth once daily. 90 tablet 0    NIFEdipine (PROCARDIA-XL) 30 MG (OSM) 24 hr tablet Take 1 tablet (30 mg total) by mouth 2 (two) times a day. 180 tablet 0     No current facility-administered medications for this visit.        History  Past Medical History:   Diagnosis Date    Arthritis     Back pain     Cancer     ovarian    Depression     Diabetes mellitus     Fibromyalgia     Hyperlipidemia     Hypertension     Lupus     Stroke     slight left sided weakness     Past Surgical History:   Procedure Laterality Date    APPENDECTOMY       SECTION      CORONARY ANGIOGRAPHY N/A 2022    Procedure: ANGIOGRAM, CORONARY ARTERY;  Surgeon: Jose L Méndez MD;  Location: [facility] CATH LAB;  Service: Cardiology;  Laterality: N/A;    HYSTERECTOMY      INJECTION OF ANESTHETIC AGENT AROUND NERVE Bilateral 2021    Procedure: BLOCK, NERVE, SYMPATHIC;  Surgeon: Holden Pereira MD;  Location: [facility] PAIN MGT;  Service: Pain Management;  Laterality: Bilateral;    INJECTION OF ANESTHETIC AGENT AROUND NERVE N/A 2021    Procedure: BLOCK, NERVE, SYMPATHETIC  need consent;  Surgeon: Holden Pereira MD;  Location: [facility] PAIN MGT;  Service: Pain Management;  Laterality: N/A;    NV EVAL,SWALLOW FUNCTION,CINE/VIDEO RECORD  2021         TONSILLECTOMY       Social History     Socioeconomic History    Marital status:    Tobacco Use    Smoking status: Former     Types: Cigarettes     Quit date: 2020     Years since quittin.9    Smokeless tobacco: Never   Substance and Sexual Activity    Alcohol use: No    Drug use: No    Sexual activity: Not Currently     Social Determinants of Health     Financial Resource Strain: Low Risk     Difficulty of Paying Living Expenses: Not hard at all   Food Insecurity: No Food Insecurity    Worried About Running Out of Food in the Last Year: Never true    Ran Out of Food in the Last Year: Never true    Transportation Needs: No Transportation Needs    Lack of Transportation (Medical): No    Lack of Transportation (Non-Medical): No   Physical Activity: Insufficiently Active    Days of Exercise per Week: 3 days    Minutes of Exercise per Session: 20 min   Stress: No Stress Concern Present    Feeling of Stress : Not at all   Social Connections: Socially Isolated    Frequency of Communication with Friends and Family: More than three times a week    Frequency of Social Gatherings with Friends and Family: Never    Attends [employer] Services: Never    Active Member of Clubs or Organizations: No    Attends Club or Organization Meetings: Never    Marital Status:    Housing Stability: Low Risk     Unable to Pay for Housing in the Last Year: No    Number of Places Lived in the Last Year: 1    Unstable Housing in the Last Year: No     Family History   Problem Relation Age of Onset    COPD Mother     Lupus Mother     Hernia Mother     Uterine cancer Mother         vs cervical cancer    Ovarian cancer Mother     Diabetes Father     Coronary artery disease Father     Colon cancer Maternal Grandmother         in her 50's        Allergies  Review of patient's allergies indicates:   Allergen Reactions    Bleach (sodium hypochlorite) Shortness Of Breath    Nitrofurantoin macrocrystalline Anaphylaxis    Lipitor [atorvastatin] Diarrhea and Rash    Nsaids (non-steroidal anti-inflammatory drug)     Pcn [penicillins]     Toradol [ketorolac]        Review of Systems   Review of Systems   Constitutional: Negative for chills, fever and malaise/fatigue.   HENT:  Negative for nosebleeds.    Eyes:  Negative for blurred vision, double vision, vision loss in left eye and vision loss in right eye.   Cardiovascular:  Negative for chest pain, claudication, dyspnea on exertion, irregular heartbeat, leg swelling, near-syncope, orthopnea, palpitations, paroxysmal nocturnal dyspnea and syncope.   Respiratory:  Positive for hemoptysis. Negative  for cough, shortness of breath and wheezing.    Endocrine: Negative for cold intolerance and heat intolerance.   Hematologic/Lymphatic: Negative for bleeding problem. Does not bruise/bleed easily.   Skin:  Negative for color change.   Musculoskeletal:  Negative for falls, muscle weakness and myalgias.   Gastrointestinal:  Negative for abdominal pain, heartburn, hematemesis, hematochezia, hemorrhoids, jaundice, melena, nausea and vomiting.   Genitourinary:  Negative for dysuria, hematuria and nocturia.   Neurological:  Negative for dizziness, focal weakness, headaches, light-headedness, loss of balance, numbness, vertigo and weakness.   Psychiatric/Behavioral:  Negative for altered mental status, depression and memory loss. The patient is not nervous/anxious.    Allergic/Immunologic: Negative for hives and persistent infections.     Physical Exam  Vitals:    09/29/22 1509   BP: (!) 158/78   Pulse: 70     Wt Readings from Last 1 Encounters:   09/29/22 86.3 kg (190 lb 4.8 oz)     Physical Exam  Vitals and nursing note reviewed.   Constitutional:       General: She is not in acute distress.     Appearance: She is obese. She is not toxic-appearing or diaphoretic.   HENT:      Head: Normocephalic and atraumatic.      Mouth/Throat:      Lips: Pink.      Mouth: Mucous membranes are moist.   Eyes:      General: No scleral icterus.     Conjunctiva/sclera: Conjunctivae normal.   Neck:      Thyroid: No thyromegaly.      Vascular: No carotid bruit, hepatojugular reflux or JVD.      Trachea: Trachea normal.   Cardiovascular:      Rate and Rhythm: Normal rate and regular rhythm. No extrasystoles are present.     Chest Wall: PMI is not displaced.      Pulses:           Carotid pulses are 2+ on the right side and 2+ on the left side.       Radial pulses are 2+ on the right side and 2+ on the left side.      Heart sounds: S1 normal and S2 normal. No murmur heard.    No friction rub. No S3 or S4 sounds.   Pulmonary:      Effort:  Pulmonary effort is normal. No accessory muscle usage or respiratory distress.      Breath sounds: Normal breath sounds and air entry. No decreased breath sounds, wheezing, rhonchi or rales.   Abdominal:      General: Bowel sounds are normal. There is no distension or abdominal bruit.      Palpations: Abdomen is soft. There is no hepatomegaly, splenomegaly or pulsatile mass.      Tenderness: There is no abdominal tenderness.   Musculoskeletal:         General: No tenderness or deformity.      Right lower leg: No edema.      Left lower leg: No edema.   Skin:     General: Skin is warm and dry.      Capillary Refill: Capillary refill takes less than 2 seconds.      Coloration: Skin is not cyanotic or pale.      Nails: There is no clubbing.   Neurological:      General: No focal deficit present.      Mental Status: She is alert and oriented to person, place, and time.   Psychiatric:         Attention and Perception: Attention normal.         Mood and Affect: Mood normal.         Speech: Speech normal.         Behavior: Behavior normal. Behavior is cooperative.       Labs  Admission on 09/27/2022, Discharged on 09/27/2022   Component Date Value Ref Range Status    WBC 09/27/2022 11.73  3.90 - 12.70 K/uL Final    RBC 09/27/2022 3.96 (L)  4.00 - 5.40 M/uL Final    Hemoglobin 09/27/2022 11.1 (L)  12.0 - 16.0 g/dL Final    Hematocrit 09/27/2022 34.1 (L)  37.0 - 48.5 % Final    MCV 09/27/2022 86  82 - 98 fL Final    MCH 09/27/2022 28.0  27.0 - 31.0 pg Final    MCHC 09/27/2022 32.6  32.0 - 36.0 g/dL Final    RDW 09/27/2022 15.3 (H)  11.5 - 14.5 % Final    Platelets 09/27/2022 315  150 - 450 K/uL Final    MPV 09/27/2022 11.4  9.2 - 12.9 fL Final    Immature Granulocytes 09/27/2022 0.6 (H)  0.0 - 0.5 % Final    Gran # (ANC) 09/27/2022 7.2  1.8 - 7.7 K/uL Final    Immature Grans (Abs) 09/27/2022 0.07 (H)  0.00 - 0.04 K/uL Final    Comment: Mild elevation in immature granulocytes is non specific and   can be seen in a variety of  conditions including stress response,   acute inflammation, trauma and pregnancy. Correlation with other   laboratory and clinical findings is essential.      Lymph # 09/27/2022 3.2  1.0 - 4.8 K/uL Final    Mono # 09/27/2022 0.8  0.3 - 1.0 K/uL Final    Eos # 09/27/2022 0.3  0.0 - 0.5 K/uL Final    Baso # 09/27/2022 0.06  0.00 - 0.20 K/uL Final    nRBC 09/27/2022 0  0 /100 WBC Final    Gran % 09/27/2022 61.5  38.0 - 73.0 % Final    Lymph % 09/27/2022 27.5  18.0 - 48.0 % Final    Mono % 09/27/2022 7.0  4.0 - 15.0 % Final    Eosinophil % 09/27/2022 2.9  0.0 - 8.0 % Final    Basophil % 09/27/2022 0.5  0.0 - 1.9 % Final    Differential Method 09/27/2022 Automated   Final    Sodium 09/27/2022 138  136 - 145 mmol/L Final    Potassium 09/27/2022 4.3  3.5 - 5.1 mmol/L Final    Chloride 09/27/2022 106  95 - 110 mmol/L Final    CO2 09/27/2022 25  23 - 29 mmol/L Final    Glucose 09/27/2022 143 (H)  70 - 110 mg/dL Final    BUN 09/27/2022 21  8 - 23 mg/dL Final    Creatinine 09/27/2022 1.3  0.5 - 1.4 mg/dL Final    Calcium 09/27/2022 9.3  8.7 - 10.5 mg/dL Final    Total Protein 09/27/2022 7.5  6.0 - 8.4 g/dL Final    Albumin 09/27/2022 3.8  3.5 - 5.2 g/dL Final    Total Bilirubin 09/27/2022 0.2  0.1 - 1.0 mg/dL Final    Comment: For infants and newborns, interpretation of results should be based  on gestational age, weight and in agreement with clinical  observations.    Premature Infant recommended reference ranges:  Up to 24 hours.............<8.0 mg/dL  Up to 48 hours............<12.0 mg/dL  3-5 days..................<15.0 mg/dL  6-29 days.................<15.0 mg/dL      Alkaline Phosphatase 09/27/2022 80  55 - 135 U/L Final    AST 09/27/2022 23  10 - 40 U/L Final    ALT 09/27/2022 16  10 - 44 U/L Final    Anion Gap 09/27/2022 7 (L)  8 - 16 mmol/L Final    eGFR 09/27/2022 43 (A)  >60 mL/min/1.73 m^2 Final   Procedure visit on 07/21/2022   Component Date Value Ref Range Status    Color, UA 07/21/2022 Yellow   Final    pH, UA  07/21/2022 5   Final    WBC, UA 07/21/2022 +   Final    Nitrite, UA 07/21/2022 Negative   Final    Protein, POC 07/21/2022 30   Final    Glucose, UA 07/21/2022 1,000   Final    Ketones, UA 07/21/2022 Negative   Final    Urobilinogen, UA 07/21/2022 Normal   Final    Bilirubin, POC 07/21/2022 Negative   Final    Blood, UA 07/21/2022 250   Final    Clarity, UA 07/21/2022 Clear   Final    Spec Grav UA 07/21/2022 1.02   Final   Lab Visit on 07/14/2022   Component Date Value Ref Range Status    Cholesterol 07/14/2022 296 (H)  120 - 199 mg/dL Final    Comment: The National Cholesterol Education Program (NCEP) has set the  following guidelines (reference ranges) for Cholesterol:  Optimal.....................<200 mg/dL  Borderline High.............200-239 mg/dL  High........................> or = 240 mg/dL      Triglycerides 07/14/2022 533 (H)  30 - 150 mg/dL Final    Comment: The National Cholesterol Education Program (NCEP) has set the  following guidelines (reference values) for triglycerides:  Normal......................<150 mg/dL  Borderline High.............150-199 mg/dL  High........................200-499 mg/dL      HDL 07/14/2022 39 (L)  40 - 75 mg/dL Final    Comment: The National Cholesterol Education Program (NCEP) has set the  following guidelines (reference values) for HDL Cholesterol:  Low...............<40 mg/dL  Optimal...........>60 mg/dL      LDL Cholesterol 07/14/2022 Invalid, Trig>400.0  63.0 - 159.0 mg/dL Final    Comment: The National Cholesterol Education Program (NCEP) has set the  following guidelines (reference values) for LDL Cholesterol:  Optimal.......................<130 mg/dL  Borderline High...............130-159 mg/dL  High..........................160-189 mg/dL  Very High.....................>190 mg/dL      HDL/Cholesterol Ratio 07/14/2022 13.2 (L)  20.0 - 50.0 % Final    Total Cholesterol/HDL Ratio 07/14/2022 7.6 (H)  2.0 - 5.0 Final    Non-HDL Cholesterol 07/14/2022 257  mg/dL Final     Comment: Risk category and Non-HDL cholesterol goals:  Coronary heart disease (CHD)or equivalent (10-year risk of CHD >20%):  Non-HDL cholesterol goal     <130 mg/dL  Two or more CHD risk factors and 10-year risk of CHD <= 20%:  Non-HDL cholesterol goal     <160 mg/dL  0 to 1 CHD risk factor:  Non-HDL cholesterol goal     <190 mg/dL      Hemoglobin A1C 07/14/2022 10.5 (H)  4.0 - 5.6 % Final    Comment: ADA Screening Guidelines:  5.7-6.4%  Consistent with prediabetes  >or=6.5%  Consistent with diabetes    High levels of fetal hemoglobin interfere with the HbA1C  assay. Heterozygous hemoglobin variants (HbS, HgC, etc)do  not significantly interfere with this assay.   However, presence of multiple variants may affect accuracy.      Estimated Avg Glucose 07/14/2022 255 (H)  68 - 131 mg/dL Final    Uric Acid 07/14/2022 8.6 (H)  2.4 - 5.7 mg/dL Final    WBC 07/14/2022 11.49  3.90 - 12.70 K/uL Final    RBC 07/14/2022 3.92 (L)  4.00 - 5.40 M/uL Final    Hemoglobin 07/14/2022 11.1 (L)  12.0 - 16.0 g/dL Final    Hematocrit 07/14/2022 33.9 (L)  37.0 - 48.5 % Final    MCV 07/14/2022 87  82 - 98 fL Final    MCH 07/14/2022 28.3  27.0 - 31.0 pg Final    MCHC 07/14/2022 32.7  32.0 - 36.0 g/dL Final    RDW 07/14/2022 14.6 (H)  11.5 - 14.5 % Final    Platelets 07/14/2022 268  150 - 450 K/uL Final    MPV 07/14/2022 11.2  9.2 - 12.9 fL Final    Immature Granulocytes 07/14/2022 0.3  0.0 - 0.5 % Final    Gran # (ANC) 07/14/2022 7.3  1.8 - 7.7 K/uL Final    Immature Grans (Abs) 07/14/2022 0.04  0.00 - 0.04 K/uL Final    Comment: Mild elevation in immature granulocytes is non specific and   can be seen in a variety of conditions including stress response,   acute inflammation, trauma and pregnancy. Correlation with other   laboratory and clinical findings is essential.      Lymph # 07/14/2022 2.9  1.0 - 4.8 K/uL Final    Mono # 07/14/2022 0.7  0.3 - 1.0 K/uL Final    Eos # 07/14/2022 0.4  0.0 - 0.5 K/uL Final    Baso # 07/14/2022 0.04   0.00 - 0.20 K/uL Final    nRBC 07/14/2022 0  0 /100 WBC Final    Gran % 07/14/2022 64.0  38.0 - 73.0 % Final    Lymph % 07/14/2022 25.3  18.0 - 48.0 % Final    Mono % 07/14/2022 6.4  4.0 - 15.0 % Final    Eosinophil % 07/14/2022 3.7  0.0 - 8.0 % Final    Basophil % 07/14/2022 0.3  0.0 - 1.9 % Final    Differential Method 07/14/2022 Automated   Final    Sodium 07/14/2022 137  136 - 145 mmol/L Final    Potassium 07/14/2022 3.8  3.5 - 5.1 mmol/L Final    Chloride 07/14/2022 98  95 - 110 mmol/L Final    CO2 07/14/2022 24  23 - 29 mmol/L Final    Glucose 07/14/2022 229 (H)  70 - 110 mg/dL Final    BUN 07/14/2022 26 (H)  8 - 23 mg/dL Final    Creatinine 07/14/2022 1.4  0.5 - 1.4 mg/dL Final    Calcium 07/14/2022 9.2  8.7 - 10.5 mg/dL Final    Total Protein 07/14/2022 7.6  6.0 - 8.4 g/dL Final    Albumin 07/14/2022 3.6  3.5 - 5.2 g/dL Final    Total Bilirubin 07/14/2022 0.2  0.1 - 1.0 mg/dL Final    Comment: For infants and newborns, interpretation of results should be based  on gestational age, weight and in agreement with clinical  observations.    Premature Infant recommended reference ranges:  Up to 24 hours.............<8.0 mg/dL  Up to 48 hours............<12.0 mg/dL  3-5 days..................<15.0 mg/dL  6-29 days.................<15.0 mg/dL      Alkaline Phosphatase 07/14/2022 109  55 - 135 U/L Final    AST 07/14/2022 12  10 - 40 U/L Final    ALT 07/14/2022 9 (L)  10 - 44 U/L Final    Anion Gap 07/14/2022 15  8 - 16 mmol/L Final    eGFR if  07/14/2022 42 (A)  >60 mL/min/1.73 m^2 Final    eGFR if non  07/14/2022 37 (A)  >60 mL/min/1.73 m^2 Final    Comment: Calculation used to obtain the estimated glomerular filtration  rate (eGFR) is the CKD-EPI equation.       Iron 07/14/2022 61  30 - 160 ug/dL Final    Transferrin 07/14/2022 317  200 - 375 mg/dL Final    TIBC 07/14/2022 469 (H)  250 - 450 ug/dL Final    Saturated Iron 07/14/2022 13 (L)  20 - 50 % Final    Ferritin 07/14/2022 57   20.0 - 300.0 ng/mL Final    Methlymalonic Acid 07/14/2022 0.57 (H)  <0.40 umol/L Final    Comment: If applicable, any drug confirmation testing reported  here was developed and the performance characteristics  determined by [facility]. This   confirmation testing has not been cleared or approved  by the FDA. The laboratory is regulated under CLIA as  qualified to perform high-complexity testing. This test  is used for patient testing purposes. It should not be  regarded as investigational or for research.    Test performed at [facility],  [address] , [place]  [address]     [phone]  Jules Polo MD  - Medical Director      Folate 07/14/2022 4.2  4.0 - 24.0 ng/mL Final    Erythropoietin 07/14/2022 15.4  2.6 - 18.5 mIU/mL Final    Comment: Test performed at [facility],  [address] , [place]  [address]     [phone]  Jules Polo MD  - Medical Director      Sed Rate 07/14/2022 46 (H)  0 - 20 mm/Hr Final    Sol. Transferrin Receptor 07/14/2022 5.6 (H)  1.8 - 4.6 mg/L Final    Comment: -------------------ADDITIONAL INFORMATION-------------------  It is reported that    Americans may   have slightly   higher values.    Test Performed by:  [place] [address]  : Jules Galicia M.D. Ph.D.; CLIA# [id number]       07/14/2022 235  110 - 260 U/L Final    Results are increased in hemolyzed samples.    Retic 07/14/2022 2.2  0.5 - 2.5 % Final    Vitamin B-12 07/14/2022 538  210 - 950 pg/mL Final   Office Visit on 07/01/2022   Component Date Value Ref Range Status    Urine Culture, Routine 07/01/2022 No significant growth   Final   No results displayed because visit has over 200 results.      Lab Visit on 04/14/2022   Component Date Value Ref Range Status    Protein, Urine Random 04/14/2022 33 (H)  0 - 15 mg/dL Final    Creatinine, Urine 04/14/2022 89.0  15.0 -  325.0 mg/dL Final    Prot/Creat Ratio, Urine 04/14/2022 0.37 (H)  0.00 - 0.20 Final    Specimen UA 04/14/2022 Urine, Clean Catch   Final    Color, UA 04/14/2022 Yellow  Yellow, Straw, Aye Final    Appearance, UA 04/14/2022 Hazy (A)  Clear Final    pH, UA 04/14/2022 6.0  5.0 - 8.0 Final    Specific Gravity, UA 04/14/2022 1.020  1.005 - 1.030 Final    Protein, UA 04/14/2022 Trace (A)  Negative Final    Comment: Recommend a 24 hour urine protein or a urine   protein/creatinine ratio if globulin induced proteinuria is  clinically suspected.      Glucose, UA 04/14/2022 3+ (A)  Negative Final    Ketones, UA 04/14/2022 Negative  Negative Final    Bilirubin (UA) 04/14/2022 Negative  Negative Final    Occult Blood UA 04/14/2022 3+ (A)  Negative Final    Nitrite, UA 04/14/2022 Negative  Negative Final    Urobilinogen, UA 04/14/2022 Negative  <2.0 EU/dL Final    Leukocytes, UA 04/14/2022 Trace (A)  Negative Final    RBC, UA 04/14/2022 60 (H)  0 - 4 /hpf Final    WBC, UA 04/14/2022 35 (H)  0 - 5 /hpf Final    WBC Clumps, UA 04/14/2022 Few (A)  None-Rare Final    Bacteria 04/14/2022 Moderate (A)  None-Occ /hpf Final    Yeast, UA 04/14/2022 None  None Final    Squam Epithel, UA 04/14/2022 12  /hpf Final    Microscopic Comment 04/14/2022 SEE COMMENT   Final    Comment: Other formed elements not mentioned in the report are not   present in the microscopic examination.      Lab Visit on 04/14/2022   Component Date Value Ref Range Status    CRP 04/14/2022 9.0 (H)  0.0 - 8.2 mg/L Final    STEVE Screen 04/14/2022 Negative <1:80  Negative <1:80 Final    STEVE test was performed by Immunofluorescence on HEP2 cells.       Rheumatoid Factor 04/14/2022 <13.0  0.0 - 15.0 IU/mL Final    Anti-SSB Antibody 04/14/2022 0.08  0.00 - 0.99 Ratio Final    Anti-SSB Interpretation 04/14/2022 Negative  Negative Final    WBC 04/14/2022 14.42 (H)  3.90 - 12.70 K/uL Final    RBC 04/14/2022 4.21  4.00 - 5.40 M/uL Final    Hemoglobin 04/14/2022 12.1  12.0 -  16.0 g/dL Final    Hematocrit 04/14/2022 36.7 (L)  37.0 - 48.5 % Final    MCV 04/14/2022 87  82 - 98 fL Final    MCH 04/14/2022 28.7  27.0 - 31.0 pg Final    MCHC 04/14/2022 33.0  32.0 - 36.0 g/dL Final    RDW 04/14/2022 14.0  11.5 - 14.5 % Final    Platelets 04/14/2022 322  150 - 450 K/uL Final    MPV 04/14/2022 11.5  9.2 - 12.9 fL Final    Immature Granulocytes 04/14/2022 0.3  0.0 - 0.5 % Final    Gran # (ANC) 04/14/2022 8.7 (H)  1.8 - 7.7 K/uL Final    Immature Grans (Abs) 04/14/2022 0.05 (H)  0.00 - 0.04 K/uL Final    Comment: Mild elevation in immature granulocytes is non specific and   can be seen in a variety of conditions including stress response,   acute inflammation, trauma and pregnancy. Correlation with other   laboratory and clinical findings is essential.      Lymph # 04/14/2022 4.2  1.0 - 4.8 K/uL Final    Mono # 04/14/2022 0.8  0.3 - 1.0 K/uL Final    Eos # 04/14/2022 0.6 (H)  0.0 - 0.5 K/uL Final    Baso # 04/14/2022 0.08  0.00 - 0.20 K/uL Final    nRBC 04/14/2022 0  0 /100 WBC Final    Gran % 04/14/2022 60.4  38.0 - 73.0 % Final    Lymph % 04/14/2022 29.0  18.0 - 48.0 % Final    Mono % 04/14/2022 5.8  4.0 - 15.0 % Final    Eosinophil % 04/14/2022 3.9  0.0 - 8.0 % Final    Basophil % 04/14/2022 0.6  0.0 - 1.9 % Final    Differential Method 04/14/2022 Automated   Final    Sodium 04/14/2022 136  136 - 145 mmol/L Final    Potassium 04/14/2022 4.2  3.5 - 5.1 mmol/L Final    Chloride 04/14/2022 99  95 - 110 mmol/L Final    CO2 04/14/2022 21 (L)  23 - 29 mmol/L Final    Glucose 04/14/2022 523 (HH)  70 - 110 mg/dL Final    Comment: GLU   critical result(s) called and verbal readback obtained from   Leigh Isbell RN by [facility] 04/14/2022 13:18      BUN 04/14/2022 29 (H)  8 - 23 mg/dL Final    Creatinine 04/14/2022 1.6 (H)  0.5 - 1.4 mg/dL Final    Calcium 04/14/2022 9.6  8.7 - 10.5 mg/dL Final    Total Protein 04/14/2022 7.9  6.0 - 8.4 g/dL Final    Albumin 04/14/2022 3.9  3.5 - 5.2 g/dL Final    Total  Bilirubin 04/14/2022 0.2  0.1 - 1.0 mg/dL Final    Comment: For infants and newborns, interpretation of results should be based  on gestational age, weight and in agreement with clinical  observations.    Premature Infant recommended reference ranges:  Up to 24 hours.............<8.0 mg/dL  Up to 48 hours............<12.0 mg/dL  3-5 days..................<15.0 mg/dL  6-29 days.................<15.0 mg/dL      Alkaline Phosphatase 04/14/2022 110  55 - 135 U/L Final    AST 04/14/2022 12  10 - 40 U/L Final    ALT 04/14/2022 8 (L)  10 - 44 U/L Final    Anion Gap 04/14/2022 16  8 - 16 mmol/L Final    eGFR if  04/14/2022 36 (A)  >60 mL/min/1.73 m^2 Final    eGFR if non  04/14/2022 31 (A)  >60 mL/min/1.73 m^2 Final    Comment: Calculation used to obtain the estimated glomerular filtration  rate (eGFR) is the CKD-EPI equation.       CCP Antibodies 04/14/2022 <0.5  <5.0 U/mL Final    Complement (C-4) 04/14/2022 45 (H)  11 - 44 mg/dL Final    Complement (C-3) 04/14/2022 162  50 - 180 mg/dL Final    Anti-Histone Antibody 04/14/2022 0.3  0.0 - 0.9 Units Final    Comment: INTERPRETIVE INFORMATION: Histone Ab, IgG  0.9 Units or less ............ Negative  1.0 - 1.5 Units .............. Weak Positive  1.6 - 2.5 Units .............. Moderate Positive  2.6 Units or greater ......... Strong Positive  Performed By: [facility]  [address] [place] [id number]  : Jodi Vu MD      Sed Rate 04/14/2022 55 (H)  0 - 20 mm/Hr Final    Hep B S Ab 04/14/2022 Negative   Final    Individual is considered not immune to HBV infection.    Vit D, 25-Hydroxy 04/14/2022 34  30 - 96 ng/mL Final    Comment: Vitamin D deficiency.........<10 ng/mL                              Vitamin D insufficiency......10-29 ng/mL       Vitamin D sufficiency........> or equal to 30 ng/mL  Vitamin D toxicity............>100 ng/mL      TSH 04/14/2022 2.084  0.400 - 4.000 uIU/mL Final    Uric Acid  04/14/2022 6.4 (H)  2.4 - 5.7 mg/dL Final    Hepatitis C Ab 04/14/2022 Negative  Negative Final    Hepatitis B Surface Ag 04/14/2022 Negative  Negative Final    CPK 04/14/2022 40  20 - 180 U/L Final    Scleroderma SCL- 04/14/2022 7  <20 UNITS Final    Comment: Interpretation: Negative    Test performed at [facility],  [address] , [place]  [address]     [phone]  Jules Polo MD  - Medical Director      HIV 1/2 Ag/Ab 04/14/2022 Negative  Negative Final    IgG 1 04/14/2022 500  382 - 929 mg/dL Final    IgG 2 04/14/2022 294  242 - 700 mg/dL Final    IgG 3 04/14/2022 31  22 - 176 mg/dL Final    IgG 4 04/14/2022 88 (H)  4 - 86 mg/dL Final    Comment: Test performed at [facility],  [address] , [place]  [address]     [phone]  Jules Polo MD  - Medical Director      IgG 04/14/2022 997  650 - 1600 mg/dL Final    IgG Cord Blood Reference Range: 650-1600 mg/dL.    IgA 04/14/2022 443 (H)  40 - 350 mg/dL Final    IgA Cord Blood Reference Range: <5 mg/dL.    IgM 04/14/2022 116  50 - 300 mg/dL Final    IgM Cord Blood Reference Range: <25 mg/dL.    Protein, Serum 04/14/2022 7.7  6.0 - 8.4 g/dL Final    Comment: Serum protein electrophoresis and immunofixation results should be   interpreted in clinical context in that some therapeutic agents can   result   in false positive results (example, daratumumab). Correlation with   the   patient s therapeutic regimen is required.      Albumin 04/14/2022 4.37  3.35 - 5.55 g/dL Final    Alpha-1 04/14/2022 0.36  0.17 - 0.41 g/dL Final    Alpha-2 04/14/2022 1.01 (H)  0.43 - 0.99 g/dL Final    Beta 04/14/2022 1.08  0.50 - 1.10 g/dL Final    Gamma 04/14/2022 0.88  0.67 - 1.58 g/dL Final    Immunofix Interp. 04/14/2022 SEE COMMENT   Final    Comment: Serum protein electrophoresis and immunofixation results should be   interpreted in clinical context in that some therapeutic agents can   result   in false positive results (example,  daratumumab). Correlation with   the   patient s therapeutic regimen is required.  See pathologist's interpretation.      PT Lupus Anticoagulant 04/14/2022 12.9  12.0 - 15.5 sec Final    PTT Lupus Anticoagulant 04/14/2022 39  32 - 48 sec Final    Thrombin Time 04/14/2022 Not Applicable  14.7 - 19.5 sec Final    Reptilase Time 04/14/2022 Not Applicable  <=21.9 sec Final    PTT Heparin Neutralized 04/14/2022 Not Applicable  32 - 48 sec Final    PTT-LA Mix 04/14/2022 Not Applicable  32 - 48 sec Final    PLATELET NEUTRALIZATION APTT 04/14/2022 Not Applicable  Negative Final    DRVVT Screen 04/14/2022 37  33 - 44 sec Final    dRVVT Incubated 1:1 Mix 04/14/2022 Not Applicable  33 - 44 sec Final    dRVVT Confirm 04/14/2022 Not Applicable  Negative ratio Final    Hex Phosph Neut Test 04/14/2022 Not Applicable  Negative Final    Lupus Anticoagulant Interpretation 04/14/2022 See Note   Final    Comment: Lupus anticoagulant not detected.  The phospholipid-dependent screening tests (PTT, DRVVT) are   not prolonged.  Lupus anticoagulant antibodies are heterogeneous and   antibody titers fluctuate over time. Laboratory tests used   to identify lupus anticoagulants demonstrate variable   sensitivity. If there is strong clinical suspicion for   antiphospholipid antibody syndrome (APS), consider testing   for cardiolipin and beta-2 glycoprotein 1 antibodies (IgG   and IgM) if this testing has not already been performed.  Performed by [facility],                              [address] [place] [id number] [phone]                    [email or web address] [email or web address] MD - Lab. Director      APA Isotype IgG 04/14/2022 <9.40  0.00 - 14.99 GPL Final    Interpretation: Absent    APA Isotype IgM 04/14/2022 18.30 (H)  0.00 - 12.49 MPL Final    Comment: Interpretation: Inconclusive    Test performed at [facility],  [address] , [place]  [address] [phone]  Jules Polo MD  - Medical Director       Beta-2 Glyco 1 IgG 04/14/2022 <9  <=20 SGU Final    Beta-2 Glyco 1 IgM 04/14/2022 <9  <=20 SMU Final    Beta-2 Glyco 1 IgA 04/14/2022 <9  <=20 TROY Final    Comment: Test performed at [facility],  [address], [place], [place]  [id number] [phone]  Jules Polo MD  - Medical Director      Cytoplasmic Neutrophilic Ab 04/14/2022 <1:20  <1:20 Titer Final    Comment: Test performed at [facility],  [address], [place]  [address]     [phone]  Jules Polo MD  - Medical Director      Perinuclear (P-ANCA) 04/14/2022 <1:20  <1:20 Titer Final    Cryoglobulin, Qual 04/14/2022 Absent  Absent Final    Comment: Test performed at [facility],  [address] [place]  [address] [phone]  Jules Polo MD  - Medical Director      Hemoglobin A1C 04/14/2022 11.6 (H)  4.0 - 5.6 % Final    Comment: ADA Screening Guidelines:  5.7-6.4%  Consistent with prediabetes  >or=6.5%  Consistent with diabetes    High levels of fetal hemoglobin interfere with the HbA1C  assay. Heterozygous hemoglobin variants (HbS, HgC, etc)do  not significantly interfere with this assay.   However, presence of multiple variants may affect accuracy.      Estimated Avg Glucose 04/14/2022 286 (H)  68 - 131 mg/dL Final    Pathologist Interpretation BRI 04/14/2022 REVIEWED   Final    Comment:   Electronically reviewed and signed by:  Tangela Reyes MD  Signed on 04/20/22 at 15:22  No monoclonal peaks identified.      Pathologist Interpretation SPE 04/14/2022 REVIEWED   Final    Comment:   Electronically reviewed and signed by:  Tangela Reyes MD  Signed on 04/20/22 at 15:22  Normal total protein.  No discrete paraprotein bands identified.         Imaging  X-Ray Chest PA And Lateral    Result Date: 9/27/2022  EXAMINATION: XR CHEST PA AND LATERAL CLINICAL HISTORY: Pleurodynia TECHNIQUE: PA and lateral views of the chest were performed. COMPARISON: 05/05/2022 FINDINGS: The lungs  are clear, with normal appearance of pulmonary vasculature and no pleural effusion or pneumothorax. The cardiac silhouette is normal in size. The hilar and mediastinal contours are unremarkable. Bones are intact.     No acute abnormality. Electronically signed by: Giorgi Yeh MD Date:    09/27/2022 Time:    16:13      Assessment  1. Atherosclerosis of native coronary artery of native heart with stable angina pectoris  Well controlled with medical therapy.    2. Essential hypertension  Blood pressure seems to be running high    3. Hypercholesterolemia  Triglycerides are a reflection of her diabetes control    4. Type 2 diabetes mellitus with stage 3a chronic kidney disease, with long-term current use of insulin  Dr. Zapata    5. Severe obesity  Unchanged      Plan and Discussion    Will stop Plavix.  But I discussed with her the Plavix does not cause bleeding.  So the underlying etiology still needs to be investigated.  I told her with 50 mg metoprolol pills, she should be taking 50 mg twice daily.    The 10-year ASCVD risk score (Annamarie DK, et al., 2019) is: 54.4%    Values used to calculate the score:      Age: 77 years      Sex: Female      Is Non- : No      Diabetic: Yes      Tobacco smoker: No      Systolic Blood Pressure: 158 mmHg      Is BP treated: Yes      HDL Cholesterol: 39 mg/dL      Total Cholesterol: 296 mg/dL     Follow Up  Follow up in about 3 months (around 12/29/2022).      Jose L Méndez MD

## 2022-09-29 NOTE — PROGRESS NOTES
[facility]    Subjective:      Patient ID: Indira Waters is a 77 y.o. female.    Chief Complaint: Hospital Follow Up    History was obtained from the patient and supplemented through chart review.  This pt is known to me.    HPI:    Patient is a 77 y.o. female with chronic medical problems including DM, HTN, HLD, CAD, fibromyalgia    Pt chooses to minimize specialists and sometimes changes her medical regimen without consulting others.  She has particular ideas of how things should unfold    Fall and ER visit weeks ago  Spit up blood clots this week Monday without coughing, vomiting  No nose bleed, no coughin  Dentures sometimes fit poorly, was on plavix and was temp stopped  However upon discussing risks and benefits, pt and daughter would rather risk another similar occasion happening rather than going off the plavix for now.  ENT referral and recommended pt see a dentist for denture sizing/ evaluation    DM  Uncontrolled  11.6 4/14/2022 -> 10.5 -> 11.2 9/30/2022  Difficulty with compliance  In the past Ice cream, grits, oatmeal, apple juice, pasta- spaghetti  Working on affordability of food, new food choices, per daughter  Daughter states glucoses have been better in the AM, no numbers available  levemir 20 BID  Novolog 12 TID  Still with nausea  In the past Did not ever get dexcom thus far, despite prior attempts  Attempt to get new Dm educat appt    Severely deconditioned  Exercise encouraged    CAD  Followed by Dr. Méndez  Cardiac Cath 5/6/2022 with significant blockages   Taking ASA 81, plavix  Not taking statin due to vasculitis thought to be 2/2 statin  On Repatha  stable    Normocytic Anemia  Need iron studies, B12, folate  Did not follow up heme onc as requested  Overdue for colonoscopy due to famiy hx of colon cancer    Fibromyalgia   Chronic Pain Syndrome  Potential plan for surgical implantation of spinal cord stimulator with Dr. Covington if can get A1C < 10  Roabxin, norco  10    Lupus?  Not treated in the past    COPD/asthma?  combivent  Stable, breathing better with lasix    Osteoporosis  Did not tolerate bisphosphonate in the past  AE dicussed, Rx prolia  Advised on r/b, pt choses to pursue    HTN  Uncontrolled today  Procardia 30 BID, losartan 50 mg daily, clonidine 0.1 mg BID, toprol 50 mg daily  F/u BP w/ 2 week nurse visit    CHF   Grade II DD  Lasix 40 mg daily  Doing well    Drug induced Vasculitis  Thought to be 2/2 to atorvastatin, switched to repatha q 14 days  Was discontinued in April for unknown reason.  Reordering and sent pt portal message    CKD Stage 3- worsening to CKD Stage 4?  Nephrology referral delayed.  Family missed rescheduling of appt  Try again  Avoid nsaids, nephrotoxic meds    Long standing hx of kidney stones  Hx of lithotripsy in the past  Referral urology  Checking uric acid, had been elevated in the past  CT renal stone study August 2022    Also with constipation    Depression  zoloft 100 mg daily   Stable, mentions long term chronic loss of son since 2007    History of cervical cancer  S/p hyst with 1 ovary remaining  Appt with gyn canceled and not rescheduled in the past      Medical History  Past Medical History:   Diagnosis Date    Arthritis     Back pain     Cancer     ovarian    Depression     Diabetes mellitus     Fibromyalgia     Hyperlipidemia     Hypertension     Lupus     Stroke     slight left sided weakness       Review of Systems   Constitutional:  Negative for fever.        Deconditioned   HENT:  Negative for trouble swallowing.    Respiratory:  Negative for shortness of breath.    Cardiovascular:  Negative for chest pain.   Gastrointestinal:  Positive for abdominal pain and constipation. Negative for nausea and vomiting.   Musculoskeletal:  Positive for arthralgias (improving), back pain and gait problem.   Skin:  Positive for pallor.   Neurological:  Negative for dizziness, seizures and light-headedness.   Psychiatric/Behavioral:   Negative for hallucinations.        Surgical hx, family hx, social hx   Have been reviewed      Current Outpatient Medications:     acetaminophen (TYLENOL) 500 MG tablet, Take 2 tablets (1,000 mg total) by mouth every 8 (eight) hours as needed for Pain., Disp: , Rfl: 0    allopurinoL (ZYLOPRIM) 300 MG tablet, Take 1 tablet (300 mg total) by mouth once daily., Disp: 90 tablet, Rfl: 1    aspirin (ECOTRIN) 81 MG EC tablet, Take 1 tablet (81 mg total) by mouth once daily., Disp: 30 tablet, Rfl: 0    blood sugar diagnostic Strp, 1 each by Misc.(Non-Drug; Combo Route) route 4 (four) times daily., Disp: 200 each, Rfl: 0    blood-glucose meter Misc, Follow package directions (Patient taking differently: Follow package directions), Disp: 1 each, Rfl: 0    blood-glucose meter,continuous (DEXCOM G6 ) Misc, 1 each by Misc.(Non-Drug; Combo Route) route continuous prn., Disp: 1 each, Rfl: PRN    blood-glucose sensor (DEXCOM G6 SENSOR) Nicole, 3 each by Misc.(Non-Drug; Combo Route) route continuous prn. Change every 10 days., Disp: 3 each, Rfl: PRN    blood-glucose transmitter (DEXCOM G6 TRANSMITTER) Nicole, 1 each by Misc.(Non-Drug; Combo Route) route continuous prn., Disp: 1 each, Rfl: PRN    cloNIDine (CATAPRES) 0.1 MG tablet, Take 1 tablet (0.1 mg total) by mouth 2 (two) times daily., Disp: 180 tablet, Rfl: 0    evolocumab (REPATHA SURECLICK) 140 mg/mL PnIj, Inject 1 mL (140 mg total) into the skin every 14 (fourteen) days., Disp: 8 each, Rfl: 3    fenofibrate micronized (LOFIBRA) 134 MG Cap, Take 1 capsule (134 mg total) by mouth daily with breakfast., Disp: 90 capsule, Rfl: 3    furosemide (LASIX) 40 MG tablet, Take 1 tablet (40 mg total) by mouth once daily., Disp: 30 tablet, Rfl: 11    HYDROcodone-acetaminophen (NORCO)  mg per tablet, Take 1 tablet by mouth every 8 (eight) hours as needed for Pain., Disp: 90 tablet, Rfl: 0    insulin aspart U-100 (NOVOLOG) 100 unit/mL (3 mL) InPn pen, Inject 12 Units into the  "skin 3 (three) times daily with meals. Plus correction scale. Max TDD 40units., Disp: 15 mL, Rfl: 3    insulin detemir U-100 (LEVEMIR FLEXTOUCH U-100 INSULN) 100 unit/mL (3 mL) InPn pen, Inject 20 Units into the skin 2 (two) times daily., Disp: 15 mL, Rfl: 3    ipratropium-albuteroL (COMBIVENT)  mcg/actuation inhaler, Inhale 1 puff into the lungs by mouth every 6 (six) hours., Disp: 4 g, Rfl: 0    losartan (COZAAR) 50 MG tablet, Take 1 tablet (50 mg total) by mouth once daily., Disp: 90 tablet, Rfl: 0    methocarbamoL (ROBAXIN) 500 MG Tab, Take 1 tablet (500 mg total) by mouth 3 (three) times daily as needed (muscle spasm)., Disp: 90 tablet, Rfl: 0    metoclopramide HCl (REGLAN) 5 MG tablet, Take 1 tablet (5 mg total) by mouth 4 (four) times daily as needed (nausea, prevention)., Disp: 30 tablet, Rfl: 3    metoprolol succinate (TOPROL-XL) 100 MG 24 hr tablet, Take 1 tablet (100 mg total) by mouth once daily., Disp: 30 tablet, Rfl: 1    nitroGLYCERIN (NITROSTAT) 0.4 MG SL tablet, Place 1 tablet (0.4 mg total) under the tongue every 5 (five) minutes as needed for Chest pain., Disp: 25 tablet, Rfl: 5    pantoprazole (PROTONIX) 40 MG tablet, Take 1 tablet (40 mg total) by mouth once daily., Disp: 30 tablet, Rfl: 0    pen needle, diabetic (BD ULTRA-FINE HIEN PEN NEEDLE) 32 gauge x 5/32" Ndle, 1 each by Misc.(Non-Drug; Combo Route) route 4 (four) times daily., Disp: 400 each, Rfl: 1    pregabalin (LYRICA) 150 MG capsule, Take 1 capsule (150 mg total) by mouth 3 (three) times daily., Disp: 90 capsule, Rfl: 2    sertraline (ZOLOFT) 100 MG tablet, Take 1 tablet (100 mg total) by mouth once daily., Disp: 90 tablet, Rfl: 3    triamcinolone acetonide 0.1% (KENALOG) 0.1 % cream, Apply to affected areas of body twice daily as needed rash. Do not use on face, underarms, or groin., Disp: 454 g, Rfl: 0    TRUEPLUS LANCETS 30 gauge Misc, TEST AS DIRECTED FOUR TIMES DAILY, Disp: 200 each, Rfl: 2    clopidogreL (PLAVIX) 75 mg " tablet, Take 1 tablet (75 mg total) by mouth once daily., Disp: 30 tablet, Rfl: 3    denosumab (PROLIA) 60 mg/mL Syrg, Inject 1 mL (60 mg total) into the skin every 6 (six) months., Disp: 2 mL, Rfl: 0    NIFEdipine (PROCARDIA-XL) 30 MG (OSM) 24 hr tablet, Take 1 tablet (30 mg total) by mouth 2 (two) times a day., Disp: 180 tablet, Rfl: 0    senna-docusate 8.6-50 mg (PERICOLACE) 8.6-50 mg per tablet, Take 1 tablet by mouth 2 (two) times daily as needed for Constipation. (Patient not taking: Reported on 9/30/2022), Disp: 60 tablet, Rfl: 0    Objective:        Body mass index is 37.17 kg/m².  Vitals:    09/30/22 1358   BP: (!) 148/80   Pulse: 72   SpO2: 95%   Height: 5' (1.524 m)   PainSc:   9   PainLoc: Generalized       Physical Exam  Vitals and nursing note reviewed.   Constitutional:       General: She is not in acute distress.     Appearance: Normal appearance. She is not ill-appearing.      Comments: Ambulates with cane   HENT:      Head: Normocephalic and atraumatic.      Mouth/Throat:      Comments: No bleeding, no lesions, no trauma, no abscess  Eyes:      General: No scleral icterus.  Cardiovascular:      Rate and Rhythm: Normal rate and regular rhythm.      Comments: Diminished heart sounds  Pulmonary:      Effort: Pulmonary effort is normal.   Abdominal:      General: There is no distension.      Tenderness: There is no abdominal tenderness.   Musculoskeletal:         General: No deformity.      Cervical back: Normal range of motion.      Comments: Trace bilat edema   Skin:     General: Skin is warm and dry.   Neurological:      Mental Status: She is alert and oriented to person, place, and time.   Psychiatric:         Behavior: Behavior normal.           Lab Results   Component Value Date    WBC 10.85 09/30/2022    HGB 10.6 (L) 09/30/2022    HCT 33.4 (L) 09/30/2022     09/30/2022    CHOL 171 09/30/2022    TRIG 210 (H) 09/30/2022    HDL 38 (L) 09/30/2022    ALT 17 09/30/2022    AST 20 09/30/2022      09/30/2022    K 4.5 09/30/2022     09/30/2022    CREATININE 1.4 09/30/2022    BUN 16 09/30/2022    CO2 22 (L) 09/30/2022    TSH 2.432 09/30/2022    INR 1.0 05/05/2022    HGBA1C 11.2 (H) 09/30/2022       The 10-year ASCVD risk score (Annamarie WEATHERS, et al., 2019) is: 48.7%    Values used to calculate the score:      Age: 77 years      Sex: Female      Is Non- : No      Diabetic: Yes      Tobacco smoker: No      Systolic Blood Pressure: 148 mmHg      Is BP treated: Yes      HDL Cholesterol: 38 mg/dL      Total Cholesterol: 171 mg/dL    Repatha    (Imaging have been independently reviewed)      Assessment:         1. Chronic pain syndrome    2. Adverse effect of bisphosphonate, sequela    3. Radiculopathy of thoracolumbar region    4. Diabetic polyneuropathy associated with diabetes mellitus due to underlying condition    5. Familial hypercholesterolemia    6. Age-related osteoporosis with current pathological fracture, initial encounter    7. Stage 4 chronic kidney disease    8. At risk for bleeding    9. Type 2 diabetes mellitus with hyperglycemia, with long-term current use of insulin    10. Normocytic anemia    11. Iron deficiency anemia, unspecified iron deficiency anemia type    12. Coronary artery calcification    13. Hemoptysis    14. 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 pectoris    15. Recurrent UTI            Plan:     Indira was seen today for hospital follow up.    Diagnoses and all orders for this visit:    Chronic pain syndrome  -     HOSPITAL BED FOR HOME USE    Adverse effect of bisphosphonate, sequela    Radiculopathy of thoracolumbar region  -     HOSPITAL BED FOR HOME USE    Diabetic polyneuropathy associated with diabetes mellitus due to underlying condition  -     HOSPITAL BED FOR HOME USE    Familial hypercholesterolemia  -     Lipid Panel; Future  -     TSH; Future    Age-related osteoporosis with current pathological fracture,  initial encounter  -     denosumab (PROLIA) 60 mg/mL Syrg; Inject 1 mL (60 mg total) into the skin every 6 (six) months.  -     Ambulatory referral/consult to Pharmacy Assistance; Future    Stage 4 chronic kidney disease  -     Comprehensive Metabolic Panel; Future  -     PTH, Intact; Future    At risk for bleeding  -     Occult blood x 1, stool; Future  -     Occult blood x 1, stool; Future  -     Occult blood x 1, stool; Future  -     CBC Auto Differential; Future    Type 2 diabetes mellitus with hyperglycemia, with long-term current use of insulin  -     Hemoglobin A1C; Future  -     Cancel: POCT Microalbumin/Creatinine Ratio  -     Microalbumin/Creatinine Ratio, Urine; Future    Normocytic anemia  -     CBC Auto Differential; Future    Iron deficiency anemia, unspecified iron deficiency anemia type  -     Occult blood x 1, stool; Future  -     Occult blood x 1, stool; Future  -     Occult blood x 1, stool; Future    Coronary artery calcification  -     clopidogreL (PLAVIX) 75 mg tablet; Take 1 tablet (75 mg total) by mouth once daily.    Hemoptysis  -     Ambulatory referral/consult to ENT; Future    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 pectoris  -     clopidogreL (PLAVIX) 75 mg tablet; Take 1 tablet (75 mg total) by mouth once daily.    Recurrent UTI  -     Cancel: Urinalysis, Reflex to Urine Culture Urine, Clean Catch  -     Urinalysis, Reflex to Urine Culture Urine, Clean Catch; Future        Follow up in about 4 weeks (around 10/28/2022) for virtual 1 month for hemoptysis f/u. or sooner prn        56 min were used in chart review, evaluation and counseling of patient, documentation and review of results on same day of service     All medications were reviewed including potential side effects and risks/benefits.  Pt was counseled to call back if anything worsens or if questions arise.    Chun Zapata MD  Family Medicine  [facility]  [address] [place] [address]  [address]ve  [address]  [place] [address]  Phone [phone]  Fax [phone]

## 2022-09-30 ENCOUNTER — SPECIALTY PHARMACY (OUTPATIENT)
Dept: PHARMACY | Facility: CLINIC | Age: 77
End: 2022-09-30
Payer: MEDICARE

## 2022-09-30 ENCOUNTER — LAB VISIT (OUTPATIENT)
Dept: LAB | Facility: OTHER | Age: 77
End: 2022-09-30
Attending: STUDENT IN AN ORGANIZED HEALTH CARE EDUCATION/TRAINING PROGRAM
Payer: MEDICARE

## 2022-09-30 ENCOUNTER — OFFICE VISIT (OUTPATIENT)
Dept: INTERNAL MEDICINE | Facility: CLINIC | Age: 77
End: 2022-09-30
Payer: MEDICARE

## 2022-09-30 ENCOUNTER — TELEPHONE (OUTPATIENT)
Dept: PHARMACY | Facility: CLINIC | Age: 77
End: 2022-09-30
Payer: MEDICARE

## 2022-09-30 ENCOUNTER — TELEPHONE (OUTPATIENT)
Dept: FAMILY MEDICINE | Facility: CLINIC | Age: 77
End: 2022-09-30
Payer: MEDICARE

## 2022-09-30 ENCOUNTER — TELEPHONE (OUTPATIENT)
Dept: INTERNAL MEDICINE | Facility: CLINIC | Age: 77
End: 2022-09-30

## 2022-09-30 VITALS
HEIGHT: 60 IN | HEART RATE: 72 BPM | SYSTOLIC BLOOD PRESSURE: 148 MMHG | DIASTOLIC BLOOD PRESSURE: 80 MMHG | BODY MASS INDEX: 37.17 KG/M2 | OXYGEN SATURATION: 95 %

## 2022-09-30 DIAGNOSIS — M54.15 RADICULOPATHY OF THORACOLUMBAR REGION: ICD-10-CM

## 2022-09-30 DIAGNOSIS — M80.00XA AGE-RELATED OSTEOPOROSIS WITH CURRENT PATHOLOGICAL FRACTURE, INITIAL ENCOUNTER: ICD-10-CM

## 2022-09-30 DIAGNOSIS — N18.4 STAGE 4 CHRONIC KIDNEY DISEASE: ICD-10-CM

## 2022-09-30 DIAGNOSIS — I25.84 CORONARY ARTERY CALCIFICATION: Chronic | ICD-10-CM

## 2022-09-30 DIAGNOSIS — D64.9 NORMOCYTIC ANEMIA: ICD-10-CM

## 2022-09-30 DIAGNOSIS — E08.42 DIABETIC POLYNEUROPATHY ASSOCIATED WITH DIABETES MELLITUS DUE TO UNDERLYING CONDITION: Chronic | ICD-10-CM

## 2022-09-30 DIAGNOSIS — T45.8X5S ADVERSE EFFECT OF BISPHOSPHONATE, SEQUELA: ICD-10-CM

## 2022-09-30 DIAGNOSIS — Z91.89 AT RISK FOR BLEEDING: ICD-10-CM

## 2022-09-30 DIAGNOSIS — I25.10 CORONARY ARTERY CALCIFICATION: Chronic | ICD-10-CM

## 2022-09-30 DIAGNOSIS — E11.65 TYPE 2 DIABETES MELLITUS WITH HYPERGLYCEMIA, WITH LONG-TERM CURRENT USE OF INSULIN: Chronic | ICD-10-CM

## 2022-09-30 DIAGNOSIS — R04.2 HEMOPTYSIS: ICD-10-CM

## 2022-09-30 DIAGNOSIS — D50.9 IRON DEFICIENCY ANEMIA, UNSPECIFIED IRON DEFICIENCY ANEMIA TYPE: ICD-10-CM

## 2022-09-30 DIAGNOSIS — G89.4 CHRONIC PAIN SYNDROME: Primary | ICD-10-CM

## 2022-09-30 DIAGNOSIS — Z79.4 TYPE 2 DIABETES MELLITUS WITH HYPERGLYCEMIA, WITH LONG-TERM CURRENT USE OF INSULIN: Chronic | ICD-10-CM

## 2022-09-30 DIAGNOSIS — E78.01 FAMILIAL HYPERCHOLESTEROLEMIA: Chronic | ICD-10-CM

## 2022-09-30 DIAGNOSIS — I25.118 CORONARY ARTERY DISEASE OF NATIVE ARTERY OF NATIVE HEART WITH STABLE ANGINA PECTORIS: ICD-10-CM

## 2022-09-30 DIAGNOSIS — N39.0 RECURRENT UTI: ICD-10-CM

## 2022-09-30 PROBLEM — T45.8X5A: Status: ACTIVE | Noted: 2022-09-30

## 2022-09-30 LAB
ALBUMIN SERPL BCP-MCNC: 3.6 G/DL (ref 3.5–5.2)
ALBUMIN/CREAT UR: 65.8 UG/MG (ref 0–30)
ALP SERPL-CCNC: 75 U/L (ref 55–135)
ALT SERPL W/O P-5'-P-CCNC: 17 U/L (ref 10–44)
ANION GAP SERPL CALC-SCNC: 11 MMOL/L (ref 8–16)
AST SERPL-CCNC: 20 U/L (ref 10–40)
BASOPHILS # BLD AUTO: 0.06 K/UL (ref 0–0.2)
BASOPHILS NFR BLD: 0.6 % (ref 0–1.9)
BILIRUB SERPL-MCNC: 0.2 MG/DL (ref 0.1–1)
BUN SERPL-MCNC: 16 MG/DL (ref 8–23)
CALCIUM SERPL-MCNC: 9.4 MG/DL (ref 8.7–10.5)
CHLORIDE SERPL-SCNC: 106 MMOL/L (ref 95–110)
CHOLEST SERPL-MCNC: 171 MG/DL (ref 120–199)
CHOLEST/HDLC SERPL: 4.5 {RATIO} (ref 2–5)
CO2 SERPL-SCNC: 22 MMOL/L (ref 23–29)
CREAT SERPL-MCNC: 1.4 MG/DL (ref 0.5–1.4)
CREAT UR-MCNC: 22.8 MG/DL (ref 15–325)
DIFFERENTIAL METHOD: ABNORMAL
EOSINOPHIL # BLD AUTO: 0.5 K/UL (ref 0–0.5)
EOSINOPHIL NFR BLD: 4.1 % (ref 0–8)
ERYTHROCYTE [DISTWIDTH] IN BLOOD BY AUTOMATED COUNT: 15.3 % (ref 11.5–14.5)
EST. GFR  (NO RACE VARIABLE): 39 ML/MIN/1.73 M^2
ESTIMATED AVG GLUCOSE: 275 MG/DL (ref 68–131)
GLUCOSE SERPL-MCNC: 223 MG/DL (ref 70–110)
HBA1C MFR BLD: 11.2 % (ref 4–5.6)
HCT VFR BLD AUTO: 33.4 % (ref 37–48.5)
HDLC SERPL-MCNC: 38 MG/DL (ref 40–75)
HDLC SERPL: 22.2 % (ref 20–50)
HGB BLD-MCNC: 10.6 G/DL (ref 12–16)
IMM GRANULOCYTES # BLD AUTO: 0.05 K/UL (ref 0–0.04)
IMM GRANULOCYTES NFR BLD AUTO: 0.5 % (ref 0–0.5)
LDLC SERPL CALC-MCNC: 91 MG/DL (ref 63–159)
LYMPHOCYTES # BLD AUTO: 2.8 K/UL (ref 1–4.8)
LYMPHOCYTES NFR BLD: 25.5 % (ref 18–48)
MCH RBC QN AUTO: 28 PG (ref 27–31)
MCHC RBC AUTO-ENTMCNC: 31.7 G/DL (ref 32–36)
MCV RBC AUTO: 88 FL (ref 82–98)
MICROALBUMIN UR DL<=1MG/L-MCNC: 15 UG/ML
MONOCYTES # BLD AUTO: 0.8 K/UL (ref 0.3–1)
MONOCYTES NFR BLD: 7.4 % (ref 4–15)
NEUTROPHILS # BLD AUTO: 6.7 K/UL (ref 1.8–7.7)
NEUTROPHILS NFR BLD: 61.9 % (ref 38–73)
NONHDLC SERPL-MCNC: 133 MG/DL
NRBC BLD-RTO: 0 /100 WBC
PLATELET # BLD AUTO: 314 K/UL (ref 150–450)
PMV BLD AUTO: 12 FL (ref 9.2–12.9)
POTASSIUM SERPL-SCNC: 4.5 MMOL/L (ref 3.5–5.1)
PROT SERPL-MCNC: 7.2 G/DL (ref 6–8.4)
PTH-INTACT SERPL-MCNC: 38.4 PG/ML (ref 9–77)
RBC # BLD AUTO: 3.79 M/UL (ref 4–5.4)
SODIUM SERPL-SCNC: 139 MMOL/L (ref 136–145)
TRIGL SERPL-MCNC: 210 MG/DL (ref 30–150)
TSH SERPL DL<=0.005 MIU/L-ACNC: 2.43 UIU/ML (ref 0.4–4)
WBC # BLD AUTO: 10.85 K/UL (ref 3.9–12.7)

## 2022-09-30 PROCEDURE — 84443 ASSAY THYROID STIM HORMONE: CPT | Performed by: STUDENT IN AN ORGANIZED HEALTH CARE EDUCATION/TRAINING PROGRAM

## 2022-09-30 PROCEDURE — 82570 ASSAY OF URINE CREATININE: CPT | Performed by: STUDENT IN AN ORGANIZED HEALTH CARE EDUCATION/TRAINING PROGRAM

## 2022-09-30 PROCEDURE — 36415 COLL VENOUS BLD VENIPUNCTURE: CPT | Performed by: STUDENT IN AN ORGANIZED HEALTH CARE EDUCATION/TRAINING PROGRAM

## 2022-09-30 PROCEDURE — 99999 PR PBB SHADOW E&M-EST. PATIENT-LVL III: CPT | Mod: PBBFAC,,, | Performed by: STUDENT IN AN ORGANIZED HEALTH CARE EDUCATION/TRAINING PROGRAM

## 2022-09-30 PROCEDURE — 82043 UR ALBUMIN QUANTITATIVE: CPT | Performed by: STUDENT IN AN ORGANIZED HEALTH CARE EDUCATION/TRAINING PROGRAM

## 2022-09-30 PROCEDURE — 83970 ASSAY OF PARATHORMONE: CPT | Performed by: STUDENT IN AN ORGANIZED HEALTH CARE EDUCATION/TRAINING PROGRAM

## 2022-09-30 PROCEDURE — 99213 OFFICE O/P EST LOW 20 MIN: CPT | Mod: PBBFAC | Performed by: STUDENT IN AN ORGANIZED HEALTH CARE EDUCATION/TRAINING PROGRAM

## 2022-09-30 PROCEDURE — 81000 URINALYSIS NONAUTO W/SCOPE: CPT | Performed by: STUDENT IN AN ORGANIZED HEALTH CARE EDUCATION/TRAINING PROGRAM

## 2022-09-30 PROCEDURE — 99215 PR OFFICE/OUTPT VISIT, EST, LEVL V, 40-54 MIN: ICD-10-PCS | Mod: S$PBB,,, | Performed by: STUDENT IN AN ORGANIZED HEALTH CARE EDUCATION/TRAINING PROGRAM

## 2022-09-30 PROCEDURE — 80061 LIPID PANEL: CPT | Performed by: STUDENT IN AN ORGANIZED HEALTH CARE EDUCATION/TRAINING PROGRAM

## 2022-09-30 PROCEDURE — 85025 COMPLETE CBC W/AUTO DIFF WBC: CPT | Performed by: STUDENT IN AN ORGANIZED HEALTH CARE EDUCATION/TRAINING PROGRAM

## 2022-09-30 PROCEDURE — 99215 OFFICE O/P EST HI 40 MIN: CPT | Mod: S$PBB,,, | Performed by: STUDENT IN AN ORGANIZED HEALTH CARE EDUCATION/TRAINING PROGRAM

## 2022-09-30 PROCEDURE — 83036 HEMOGLOBIN GLYCOSYLATED A1C: CPT | Performed by: STUDENT IN AN ORGANIZED HEALTH CARE EDUCATION/TRAINING PROGRAM

## 2022-09-30 PROCEDURE — 80053 COMPREHEN METABOLIC PANEL: CPT | Performed by: STUDENT IN AN ORGANIZED HEALTH CARE EDUCATION/TRAINING PROGRAM

## 2022-09-30 PROCEDURE — 99999 PR PBB SHADOW E&M-EST. PATIENT-LVL III: ICD-10-PCS | Mod: PBBFAC,,, | Performed by: STUDENT IN AN ORGANIZED HEALTH CARE EDUCATION/TRAINING PROGRAM

## 2022-09-30 RX ORDER — CLOPIDOGREL BISULFATE 75 MG/1
75 TABLET ORAL DAILY
Qty: 30 TABLET | Refills: 3 | Status: SHIPPED | OUTPATIENT
Start: 2022-09-30 | End: 2023-02-01 | Stop reason: SDUPTHER

## 2022-09-30 NOTE — TELEPHONE ENCOUNTER
This is a specialty drug.  The prescription was submitting to [facility] which will contact the patient.

## 2022-09-30 NOTE — TELEPHONE ENCOUNTER
----- Message from Chun Zapata MD sent at 9/29/2022  6:17 PM CDT -----  Regarding: please help arrange for sooner hos f/u even if override is needed. joshua lerma    ----- Message -----  From: Joel Soliman  Sent: 9/28/2022   8:50 AM CDT  To: Chun Zapata MD    Name of Who is Calling: CAYLA GOODEN [[id number]]            What is the request in detail: Patient is requesting call back to get sooner appointment for hospital f/u bc of blood clot in the mouth              Can the clinic reply by [facility]: no              What Number to Call Back if not in [facility]: [phone]

## 2022-09-30 NOTE — TELEPHONE ENCOUNTER
Left a voicemail message to inform the Patient about the [facility] appointment and to schedule.  Requested the Patient to call the office back to be schedule.  Sent a detailed message thru [employer] as well.

## 2022-09-30 NOTE — TELEPHONE ENCOUNTER
Prolia covered without a PA    BENEFIT INVESTIGATION:  [employer] Medicare plan.   [facility] in network. LIS level 2. No deductible or TrOOP max.   Patient is in initial stage of coverage.   Will pay $4.00 in initial & GAP and $0 in catastrophic.

## 2022-09-30 NOTE — Clinical Note
Good afternoon,  We are working on investigating cause of hemoptysis. In the mean time, pt and daughter would like to remain on the plaxix.  They understand the risks and would rather re-evaluate if there is another occasion.  Thanks for your help.  Happy to get feedback, but I suspect you will be ok with this.  Thanks, Chun

## 2022-10-01 LAB
BACTERIA #/AREA URNS HPF: ABNORMAL /HPF
BILIRUB UR QL STRIP: NEGATIVE
CLARITY UR: CLEAR
COLOR UR: YELLOW
GLUCOSE UR QL STRIP: ABNORMAL
HGB UR QL STRIP: ABNORMAL
KETONES UR QL STRIP: NEGATIVE
LEUKOCYTE ESTERASE UR QL STRIP: ABNORMAL
MICROSCOPIC COMMENT: ABNORMAL
NITRITE UR QL STRIP: NEGATIVE
PH UR STRIP: 6 [PH] (ref 5–8)
PROT UR QL STRIP: NEGATIVE
RBC #/AREA URNS HPF: 3 /HPF (ref 0–4)
SP GR UR STRIP: <=1.005 (ref 1–1.03)
SQUAMOUS #/AREA URNS HPF: 2 /HPF
URN SPEC COLLECT METH UR: ABNORMAL
WBC #/AREA URNS HPF: 7 /HPF (ref 0–5)
YEAST URNS QL MICRO: ABNORMAL

## 2022-10-03 ENCOUNTER — SPECIALTY PHARMACY (OUTPATIENT)
Dept: PHARMACY | Facility: CLINIC | Age: 77
End: 2022-10-03
Payer: MEDICARE

## 2022-10-03 ENCOUNTER — TELEPHONE (OUTPATIENT)
Dept: INTERNAL MEDICINE | Facility: CLINIC | Age: 77
End: 2022-10-03
Payer: MEDICARE

## 2022-10-03 ENCOUNTER — PATIENT MESSAGE (OUTPATIENT)
Dept: PHARMACY | Facility: CLINIC | Age: 77
End: 2022-10-03
Payer: MEDICARE

## 2022-10-03 DIAGNOSIS — M81.0 OSTEOPOROSIS, UNSPECIFIED OSTEOPOROSIS TYPE, UNSPECIFIED PATHOLOGICAL FRACTURE PRESENCE: Primary | ICD-10-CM

## 2022-10-03 DIAGNOSIS — Z71.89 COMPLEX CARE COORDINATION: ICD-10-CM

## 2022-10-03 NOTE — TELEPHONE ENCOUNTER
Specialty Pharmacy - Refill Coordination    Specialty Medication Orders Linked to Encounter      Flowsheet Row Most Recent Value   Medication #1 evolocumab (REPATHA SURECLICK) 140 mg/mL PnIj (Order#[id number], Rx#[id number])            Refill Questions - Documented Responses      Flowsheet Row Most Recent Value   Patient Availability and HIPAA Verification    Does patient want to proceed with activity? Yes   HIPAA/medical authority confirmed? Yes   Relationship to patient of person spoken to? Child   Refill Screening Questions    Would patient like to speak to a pharmacist? No   When does the patient need to receive the medication? 10/07/22   Refill Delivery Questions    How will the patient receive the medication? [facility]   When does the patient need to receive the medication? 10/07/22   Shipping Address Home   Address in [facility] confirmed and updated if neccessary? Yes   Expected Copay ($) 4   Is the patient able to afford the medication copay? Yes   Payment Method CC on file   Days supply of Refill 28   Supplies needed? Injection Device   Refill activity completed? Yes   Refill activity plan Refill scheduled   Shipment/Pickup Date: 10/04/22            Current Outpatient Medications   Medication Sig    acetaminophen (TYLENOL) 500 MG tablet Take 2 tablets (1,000 mg total) by mouth every 8 (eight) hours as needed for Pain.    allopurinoL (ZYLOPRIM) 300 MG tablet Take 1 tablet (300 mg total) by mouth once daily.    aspirin (ECOTRIN) 81 MG EC tablet Take 1 tablet (81 mg total) by mouth once daily.    blood sugar diagnostic Strp 1 each by Misc.(Non-Drug; Combo Route) route 4 (four) times daily.    blood-glucose meter Misc Follow package directions (Patient taking differently: Follow package directions)    blood-glucose meter,continuous (DEXCOM G6 ) Misc 1 each by Misc.(Non-Drug; Combo Route) route continuous prn.    blood-glucose sensor (DEXCOM G6 SENSOR) Nicole 3 each by Misc.(Non-Drug; Combo Route)  route continuous prn. Change every 10 days.    blood-glucose transmitter (DEXCOM G6 TRANSMITTER) Nicole 1 each by Misc.(Non-Drug; Combo Route) route continuous prn.    cloNIDine (CATAPRES) 0.1 MG tablet Take 1 tablet (0.1 mg total) by mouth 2 (two) times daily.    clopidogreL (PLAVIX) 75 mg tablet Take 1 tablet (75 mg total) by mouth once daily.    denosumab (PROLIA) 60 mg/mL Syrg Inject 1 mL (60 mg total) into the skin every 6 (six) months.    evolocumab (REPATHA SURECLICK) 140 mg/mL PnIj Inject 1 mL (140 mg total) into the skin every 14 (fourteen) days.    fenofibrate micronized (LOFIBRA) 134 MG Cap Take 1 capsule (134 mg total) by mouth daily with breakfast.    furosemide (LASIX) 40 MG tablet Take 1 tablet (40 mg total) by mouth once daily.    HYDROcodone-acetaminophen (NORCO)  mg per tablet Take 1 tablet by mouth every 8 (eight) hours as needed for Pain.    insulin aspart U-100 (NOVOLOG) 100 unit/mL (3 mL) InPn pen Inject 12 Units into the skin 3 (three) times daily with meals. Plus correction scale. Max TDD 40units.    insulin detemir U-100 (LEVEMIR FLEXTOUCH U-100 INSULN) 100 unit/mL (3 mL) InPn pen Inject 20 Units into the skin 2 (two) times daily.    ipratropium-albuteroL (COMBIVENT)  mcg/actuation inhaler Inhale 1 puff into the lungs by mouth every 6 (six) hours.    losartan (COZAAR) 50 MG tablet Take 1 tablet (50 mg total) by mouth once daily.    methocarbamoL (ROBAXIN) 500 MG Tab Take 1 tablet (500 mg total) by mouth 3 (three) times daily as needed (muscle spasm).    metoclopramide HCl (REGLAN) 5 MG tablet Take 1 tablet (5 mg total) by mouth 4 (four) times daily as needed (nausea, prevention).    metoprolol succinate (TOPROL-XL) 100 MG 24 hr tablet Take 1 tablet (100 mg total) by mouth once daily.    NIFEdipine (PROCARDIA-XL) 30 MG (OSM) 24 hr tablet Take 1 tablet (30 mg total) by mouth 2 (two) times a day.    nitroGLYCERIN (NITROSTAT) 0.4 MG SL tablet Place 1 tablet (0.4 mg total) under the  "tongue every 5 (five) minutes as needed for Chest pain.    pantoprazole (PROTONIX) 40 MG tablet Take 1 tablet (40 mg total) by mouth once daily.    pen needle, diabetic (BD ULTRA-FINE HIEN PEN NEEDLE) 32 gauge x 5/32" Ndle 1 each by Misc.(Non-Drug; Combo Route) route 4 (four) times daily.    pregabalin (LYRICA) 150 MG capsule Take 1 capsule (150 mg total) by mouth 3 (three) times daily.    senna-docusate 8.6-50 mg (PERICOLACE) 8.6-50 mg per tablet Take 1 tablet by mouth 2 (two) times daily as needed for Constipation. (Patient not taking: Reported on 9/30/2022)    sertraline (ZOLOFT) 100 MG tablet Take 1 tablet (100 mg total) by mouth once daily.    triamcinolone acetonide 0.1% (KENALOG) 0.1 % cream Apply to affected areas of body twice daily as needed rash. Do not use on face, underarms, or groin.    TRUEPLUS LANCETS 30 gauge Misc TEST AS DIRECTED FOUR TIMES DAILY   Last reviewed on 9/30/2022  2:01 PM by Maxime Isbell MA    Review of patient's allergies indicates:   Allergen Reactions    Bleach (sodium hypochlorite) Shortness Of Breath    Nitrofurantoin macrocrystalline Anaphylaxis    Lipitor [atorvastatin] Diarrhea and Rash    Nsaids (non-steroidal anti-inflammatory drug)     Pcn [penicillins]     Toradol [ketorolac]     Last reviewed on  9/30/2022 1:59 PM by Maxime Isbell      Tasks added this encounter   10/28/2022 - Refill Call (Auto Added)   Tasks due within next 3 months   No tasks due.     Goldy Rodriguez, PharmD  [facility]  [address]  [facility] [address]  Phone: [phone]  Fax: [phone]        "

## 2022-10-03 NOTE — TELEPHONE ENCOUNTER
Spoke to Ms. Waters and confirmed remote blood pressure appt.  Patient states understanding with no further questions or concerns.

## 2022-10-03 NOTE — TELEPHONE ENCOUNTER
----- Message from Chun Zapata MD sent at 9/30/2022 11:53 PM CDT -----  Can we please set up a remote Bp rcheck in approx 1 week

## 2022-10-03 NOTE — TELEPHONE ENCOUNTER
Specialty Pharmacy - Initial Clinical Assessment    Specialty Medication Orders Linked to Encounter      Flowsheet Row Most Recent Value   Medication #1 denosumab (PROLIA) 60 mg/mL Syrg (Order#[id number], Rx#[id number])          Patient Diagnosis   M81.0 - OP (osteoporosis)    Subjective    Indira Waters is a 77 y.o. female, who is followed by the specialty pharmacy service for management and education.    Recent Encounters       Date Type Provider Description    10/03/2022 Specialty Pharmacy Goldy Rodriguez PharmD Initial Clinical Assessment    10/03/2022 Specialty Pharmacy Goldy Rodriguez PharmD Refill Coordination    09/30/2022 Specialty Pharmacy Goldy Rodriguez PharmD Referral Authorization    09/22/2022 Specialty Pharmacy Marilia Whitten, Taniya Refill Coordination    08/31/2022 Specialty Pharmacy Shruti BRUNSON Resor Refill Coordination          Clinical call attempts since last clinical assessment   6/30/2022  5:15 PM - Specialty Pharmacy - Clinical Assessment by Karon Main PharmD  7/5/2022  3:49 PM - Specialty Pharmacy - Clinical Assessment by Karon Main PharmD  7/8/2022  4:42 PM - Specialty Pharmacy - Clinical Assessment by Karon Main PharmD     Current Outpatient Medications   Medication Sig    acetaminophen (TYLENOL) 500 MG tablet Take 2 tablets (1,000 mg total) by mouth every 8 (eight) hours as needed for Pain.    allopurinoL (ZYLOPRIM) 300 MG tablet Take 1 tablet (300 mg total) by mouth once daily.    aspirin (ECOTRIN) 81 MG EC tablet Take 1 tablet (81 mg total) by mouth once daily.    blood sugar diagnostic Strp 1 each by Misc.(Non-Drug; Combo Route) route 4 (four) times daily.    blood-glucose meter Misc Follow package directions (Patient taking differently: Follow package directions)    blood-glucose meter,continuous (DEXCOM G6 ) Misc 1 each by Misc.(Non-Drug; Combo Route) route continuous prn.    blood-glucose sensor (DEXCOM G6 SENSOR) Nicole 3 each by  Misc.(Non-Drug; Combo Route) route continuous prn. Change every 10 days.    blood-glucose transmitter (DEXCOM G6 TRANSMITTER) Nicole 1 each by Misc.(Non-Drug; Combo Route) route continuous prn.    cloNIDine (CATAPRES) 0.1 MG tablet Take 1 tablet (0.1 mg total) by mouth 2 (two) times daily.    clopidogreL (PLAVIX) 75 mg tablet Take 1 tablet (75 mg total) by mouth once daily.    denosumab (PROLIA) 60 mg/mL Syrg Inject 1 mL (60 mg total) into the skin every 6 (six) months.    evolocumab (REPATHA SURECLICK) 140 mg/mL PnIj Inject 1 mL (140 mg total) into the skin every 14 (fourteen) days.    fenofibrate micronized (LOFIBRA) 134 MG Cap Take 1 capsule (134 mg total) by mouth daily with breakfast.    furosemide (LASIX) 40 MG tablet Take 1 tablet (40 mg total) by mouth once daily.    HYDROcodone-acetaminophen (NORCO)  mg per tablet Take 1 tablet by mouth every 8 (eight) hours as needed for Pain.    insulin aspart U-100 (NOVOLOG) 100 unit/mL (3 mL) InPn pen Inject 12 Units into the skin 3 (three) times daily with meals. Plus correction scale. Max TDD 40units.    insulin detemir U-100 (LEVEMIR FLEXTOUCH U-100 INSULN) 100 unit/mL (3 mL) InPn pen Inject 20 Units into the skin 2 (two) times daily.    ipratropium-albuteroL (COMBIVENT)  mcg/actuation inhaler Inhale 1 puff into the lungs by mouth every 6 (six) hours.    losartan (COZAAR) 50 MG tablet Take 1 tablet (50 mg total) by mouth once daily.    methocarbamoL (ROBAXIN) 500 MG Tab Take 1 tablet (500 mg total) by mouth 3 (three) times daily as needed (muscle spasm).    metoclopramide HCl (REGLAN) 5 MG tablet Take 1 tablet (5 mg total) by mouth 4 (four) times daily as needed (nausea, prevention).    metoprolol succinate (TOPROL-XL) 100 MG 24 hr tablet Take 1 tablet (100 mg total) by mouth once daily.    NIFEdipine (PROCARDIA-XL) 30 MG (OSM) 24 hr tablet Take 1 tablet (30 mg total) by mouth 2 (two) times a day.    nitroGLYCERIN (NITROSTAT) 0.4 MG SL tablet Place 1 tablet  "(0.4 mg total) under the tongue every 5 (five) minutes as needed for Chest pain.    pantoprazole (PROTONIX) 40 MG tablet Take 1 tablet (40 mg total) by mouth once daily.    pen needle, diabetic (BD ULTRA-FINE HIEN PEN NEEDLE) 32 gauge x 5/32" Ndle 1 each by Misc.(Non-Drug; Combo Route) route 4 (four) times daily.    pregabalin (LYRICA) 150 MG capsule Take 1 capsule (150 mg total) by mouth 3 (three) times daily.    senna-docusate 8.6-50 mg (PERICOLACE) 8.6-50 mg per tablet Take 1 tablet by mouth 2 (two) times daily as needed for Constipation. (Patient not taking: Reported on 9/30/2022)    sertraline (ZOLOFT) 100 MG tablet Take 1 tablet (100 mg total) by mouth once daily.    triamcinolone acetonide 0.1% (KENALOG) 0.1 % cream Apply to affected areas of body twice daily as needed rash. Do not use on face, underarms, or groin.    TRUEPLUS LANCETS 30 gauge Misc TEST AS DIRECTED FOUR TIMES DAILY   Last reviewed on 9/30/2022  2:01 PM by Maxime Isbell MA    Review of patient's allergies indicates:   Allergen Reactions    Bleach (sodium hypochlorite) Shortness Of Breath    Nitrofurantoin macrocrystalline Anaphylaxis    Lipitor [atorvastatin] Diarrhea and Rash    Nsaids (non-steroidal anti-inflammatory drug)     Pcn [penicillins]     Toradol [ketorolac]    Last reviewed on  9/30/2022 1:59 PM by Maxime Isbell    Drug Interactions    Drug interactions evaluated: yes  Clinically relevant drug interactions identified: no  Provided the patient with educational material regarding drug interactions: not applicable         Adverse Effects    Unable to perform a full ROS: Yes   Reason: other          Assessment Questions - Documented Responses      Flowsheet Row Most Recent Value   Assessment    Medication Reconciliation completed for patient No  [patient's daughter declined]   Status of the patients ability to self-administer: Caregiver to administer  [clinic administered]   All education points have been covered with patient? " No, patient declined- printed education provided   Welcome packet contents reviewed and discussed with patient? No  [clinic administered]   Assesment completed? No  [patient's daughter declined]   Plan Therapy being initiated   Do you need to open a clinical intervention (i-vent)? No   Do you want to schedule first shipment? Yes   Medication #1 Assessment Info    Patient status New medication, Exisiting to [facility]   Is this medication appropriate for the patient? Yes   Is this medication effective? Not yet started          Refill Questions - Documented Responses      Flowsheet Row Most Recent Value   Patient Availability and HIPAA Verification    Does patient want to proceed with activity? Yes   HIPAA/medical authority confirmed? Yes   Relationship to patient of person spoken to? Child   Refill Screening Questions    When does the patient need to receive the medication? 10/07/22   Refill Delivery Questions    How will the patient receive the medication?    When does the patient need to receive the medication? 10/07/22   Shipping Address Home   Address in [facility] confirmed and updated if neccessary? Yes   Expected Copay ($) 4   Is the patient able to afford the medication copay? Yes   Payment Method CC on file   Days supply of Refill 180   Supplies needed? No supplies needed   Refill activity completed? Yes   Refill activity plan Refill scheduled   Shipment/Pickup Date: 10/06/22            Objective    She has a past medical history of Arthritis, Back pain, Cancer, Depression, Diabetes mellitus, Fibromyalgia, Hyperlipidemia, Hypertension, Lupus, and Stroke.    Tried/failed medications: bisphosphonate    BP Readings from Last 4 Encounters:   09/30/22 (!) 148/80   09/29/22 (!) 158/78   09/27/22 (!) 168/70   08/11/22 (!) 164/81     Ht Readings from Last 4 Encounters:   09/30/22 5' (1.524 m)   09/29/22 5' (1.524 m)   09/27/22 5' (1.524 m)   09/08/22 5' (1.524 m)     Wt Readings from Last 4 Encounters:    09/29/22 86.3 kg (190 lb 4.8 oz)   09/27/22 82.6 kg (182 lb)   09/08/22 85 kg (187 lb 6.3 oz)   08/11/22 83 kg (182 lb 15.7 oz)       Assessment/Plan  Patient plans to start therapy on 10/07/22      Indication, dosage, appropriateness, effectiveness, safety and convenience of her specialty medication(s) were reviewed today.     Patient Education   Pharmacist offer to  patient was declined. Printed educational materials will be provided with medication.      Patient's daughter declined Prolia consult, states information was reviewed at the office visit. No questions or concerns at this time.     Tasks added this encounter   3/29/2023 - Refill Call (Auto Added)  10/3/2022 -  Setup Confirmation   Tasks due within next 3 months   No tasks due.     Goldy Rodriguez, PharmD  [facility]  [address]  [facility] [address]  Phone: [phone]  Fax: [phone]

## 2022-10-04 NOTE — TELEPHONE ENCOUNTER
Confirmed  with Dr Zapata for Thurs 10/6    [facility]   [address] [place] [address]

## 2022-10-07 ENCOUNTER — SPECIALTY PHARMACY (OUTPATIENT)
Dept: PHARMACY | Facility: CLINIC | Age: 77
End: 2022-10-07
Payer: MEDICARE

## 2022-10-07 ENCOUNTER — PATIENT MESSAGE (OUTPATIENT)
Dept: PAIN MEDICINE | Facility: CLINIC | Age: 77
End: 2022-10-07
Payer: MEDICARE

## 2022-10-07 ENCOUNTER — OFFICE VISIT (OUTPATIENT)
Dept: ENDOCRINOLOGY | Facility: CLINIC | Age: 77
End: 2022-10-07
Payer: MEDICARE

## 2022-10-07 VITALS
BODY MASS INDEX: 36.52 KG/M2 | SYSTOLIC BLOOD PRESSURE: 138 MMHG | DIASTOLIC BLOOD PRESSURE: 66 MMHG | HEART RATE: 72 BPM | WEIGHT: 186 LBS | HEIGHT: 60 IN

## 2022-10-07 DIAGNOSIS — E66.01 CLASS 2 SEVERE OBESITY WITH SERIOUS COMORBIDITY AND BODY MASS INDEX (BMI) OF 36.0 TO 36.9 IN ADULT, UNSPECIFIED OBESITY TYPE: ICD-10-CM

## 2022-10-07 DIAGNOSIS — M81.0 AGE-RELATED OSTEOPOROSIS WITHOUT CURRENT PATHOLOGICAL FRACTURE: Primary | ICD-10-CM

## 2022-10-07 DIAGNOSIS — E11.65 TYPE 2 DIABETES MELLITUS WITH HYPERGLYCEMIA, WITH LONG-TERM CURRENT USE OF INSULIN: ICD-10-CM

## 2022-10-07 DIAGNOSIS — E11.59 TYPE 2 DIABETES MELLITUS WITH OTHER CIRCULATORY COMPLICATION, WITHOUT LONG-TERM CURRENT USE OF INSULIN: ICD-10-CM

## 2022-10-07 DIAGNOSIS — Z79.4 TYPE 2 DIABETES MELLITUS WITH HYPERGLYCEMIA, WITH LONG-TERM CURRENT USE OF INSULIN: ICD-10-CM

## 2022-10-07 PROBLEM — E66.812 CLASS 2 SEVERE OBESITY WITH SERIOUS COMORBIDITY AND BODY MASS INDEX (BMI) OF 36.0 TO 36.9 IN ADULT: Status: ACTIVE | Noted: 2022-10-07

## 2022-10-07 PROCEDURE — 99215 PR OFFICE/OUTPT VISIT, EST, LEVL V, 40-54 MIN: ICD-10-PCS | Mod: S$GLB,,, | Performed by: INTERNAL MEDICINE

## 2022-10-07 PROCEDURE — 99215 OFFICE O/P EST HI 40 MIN: CPT | Mod: S$GLB,,, | Performed by: INTERNAL MEDICINE

## 2022-10-07 RX ORDER — INSULIN DETEMIR 100 [IU]/ML
20 INJECTION, SOLUTION SUBCUTANEOUS 2 TIMES DAILY
Qty: 15 ML | Refills: 3 | Status: SHIPPED | OUTPATIENT
Start: 2022-10-07 | End: 2023-09-29

## 2022-10-07 RX ORDER — INSULIN ASPART 100 [IU]/ML
12 INJECTION, SOLUTION INTRAVENOUS; SUBCUTANEOUS
Qty: 15 ML | Refills: 3 | Status: SHIPPED | OUTPATIENT
Start: 2022-10-07 | End: 2023-07-27

## 2022-10-07 RX ORDER — TERIPARATIDE 250 UG/ML
20 INJECTION, SOLUTION SUBCUTANEOUS DAILY
Qty: 2.48 ML | Refills: 11 | Status: SHIPPED | OUTPATIENT
Start: 2022-10-07 | End: 2023-09-14 | Stop reason: SDUPTHER

## 2022-10-07 NOTE — TELEPHONE ENCOUNTER
PA submitted through [facility] - Approved       PA Case: [id number], Status: Approved, Coverage Starts on: 10/7/2022 12:00:00 AM, Coverage Ends on: 10/7/2023 12:00:00 AM.    Test Claim - $0   LIS LVL 2       Sending to initial

## 2022-10-07 NOTE — ASSESSMENT & PLAN NOTE
Body mass index is 36.33 kg/m².   complicated by HTN, HLD, diabetes   encourage pt to work on healthy diet, increase exercise as tolerated.   monitor weight

## 2022-10-07 NOTE — TELEPHONE ENCOUNTER
Adeel Tanner MD discontinued Prolia due to starting Forteo. Messaging PCP Chun Zapata MD to inform of change. Closing referral and will workup new referral for Forteo.

## 2022-10-07 NOTE — ASSESSMENT & PLAN NOTE
Complicated by hyperglycemia, sometimes hypoglycemia, neuropathy, CAD, CKD, pt also notes hx CVA, chart mentioned hx retinopathy.    Reviewed goals of therapy - to get the best control we can without hypoglycemia. Goal A1C <8 given age, comorbidities   last a1c remains above goal    Refer to education, recommend yearly DM education    Glucose reports with wide variability. 50s-400s. No log, so unable to determine patterns.  Pt/family denies missed doses of mealtime insulins.    Medication changes:   Continue same for now.    Reviewed patient's current insulin regimen. Clarified proper insulin dose and timing in relation to meals, etc. Insulin injection sites and proper rotation instructed.     -Advised frequent self blood glucose monitoring. Patient encouraged to document glucose results and bring them to every clinic visit    -Hypoglycemia precautions discussed. Instructed on precautions before driving.    -Close adherence to lifestyle changes recommended.    -Periodic follow ups for eye evaluations, foot care suggested.    Try for professional CGM as well as DM education, that can hopefully help fine tune some insulin doses.   otherwise could consider DPP4 vs GLP1 agonist if covered okay. Options limited by CKD.

## 2022-10-07 NOTE — PATIENT INSTRUCTIONS
For bones, don't use prolia yet. Try for forteo (Teriparatide). Once a day shot for about a year or two, then prolia after that.    For diabetes, try professional CGM when able and check in with diabetes education folks.    For now, continue insulins:    Basal Insulin  Take 16 units of levemir insulin twice daily    Check your glucose in the morning before breakfast and keep a log.  Goal AM glucose:       After 3-5 days, if your average glucose is above 140, increase your dose by 2 units  If your AM glucose is under 90, decrease by 2 units.       Meal-time insulin    Take 15 units of novolog with each meal (works best when taken about 5-10 minutes before you eat). If you skip a meal, skip this meal-time insulin.    This dose of insulin can be adjusted based on what your sugar is like at mealtime as well as what kind of meal you're about to eat:  If you're having a low-carb meal (like a salad), take 12 units instead  If you're having a high-carb meal (like pasta, with bread, and/or adding a pastry/dessert), take 17 units instead    This amount can then further be adjusted based on your pre-meal glucose:    Pre-meal glucose Adjustment to make   <70 Eat a meal   70-90 Decrease by 2 units    No change   150-249 Add 1 units   250-349 Add 2 units   350-449 Add 3 units   450+ Add 4 units

## 2022-10-07 NOTE — LETTER
October 7, 2022        Chun Zapata MD  [address]  [address]  [facility] [address]             [facility]  [address] [place] [address] [address]  [facility] [address]  Phone: [phone]  Fax: [phone]   Patient: Indira Waters   MR Number: [id number]   YOB: 1945   Date of Visit: 10/7/2022       Dear Dr. Zapata:    I saw your patient, Indira Waters, today for evaluation. Attached you will find relevant portions of my assessment and plan of care.    Tscore very low, fracture risk very high, want to see if anabolic agent is covered first. CKD and cardiac/other history makes options a bit limited, but ideally forteo for a year or two then prolia after that.    Diabetes is not looking great. No glucose data/log with which to recommend adjustments, so mostly trying to get her back to diabetes education and see about a professional CGM sometime to get some more information.    If you have questions, please do not hesitate to call me. I look forward to following Indira Waters along with you.    Sincerely,      Adeel Tanner MD    CC  No Recipients    Enclosure

## 2022-10-07 NOTE — PROGRESS NOTES
Subjective:      Chief Complaint: Osteoporosis    HPI: Indira Waters is a 77 y.o. female who is here for a follow-up evaluation for bones. Last seen 10/2021, though this is her first visit with me.    With regards to osteoporosis:     Pt diagnosed with osteoporosis on bone density scan from: not sure when, pt reports having it for a while.  Fractures: pt denies fractures.  First bone density was: not sure  Last bone density: 7/14/2022. FRAX 37% major, 16.7% hip  Location BMD T-score Change?   Spine 0.821 -3 N/A   Total hip (L) 0.612 -3.1 N/A   Fem neck (R) 0.546 -3.5 N/A     Osteoporosis Risk Factor Assessment:    No   Yes  [x]    []  Prior use of hormone replacement therapy  []    [x]  Falls over the age of fifty  [x]    []  fractures to her wrist, hip or spine  [x]    []  loss of height of more than 1 1/2 - 2 inches   []    [x]  family history of osteoporosis, stooped posture, or fractures of hip.  []    [x]  Kidney stones, kidney disease  [x]    []  Thyroid disease  []    [x]  Medication exposure: steroids, anticoagulants, proton pump inhibitors or anti-seizure medications  []    [x]  Tobacco use, including use in the past. Pt reports she quit.  []    [x]  EtOH use, sometimes  [x]    []  Active malignancy, history of bone malignancy, or prior radiation treatment. Hx cancer, but denied radiation treatment    Has hx heart disease, stroke.    Last labs:    Lab Results   Component Value Date    CALCIUM 9.4 09/30/2022    ALBUMIN 3.6 09/30/2022    CREATININE 1.4 09/30/2022    [id number] 34 04/14/2022    PTH 38.4 09/30/2022     Treatment:  Osteoporosis medications used in the past: pt not sure, thinks she tried a lot of medications over the years. GI side effects.    Current medication: None. Pending prolia.  Start date: Pending start.    Vitamin intake:  Calcium: not sure how much  Vit D: not sure how much    Weight bearing exercise?  Not much    Today, pt reports feeling okay overall.    Symptoms  No    Yes  []    [x]  Falls or fractures recently. No fractures.  []    [x]  Dizziness, lightheadedness    Estimated Creatinine Clearance: 33 mL/min (based on SCr of 1.4 mg/dL).    With regards to the diabetes:  Diagnosed with T2DM since around 2015   Hx DKA in 2015, started insulin then and has remained on insulin    Known complications:     Retinopathy? Yes    Nephropathy? Yes    Neuropathy? Yes    CAD? Yes    CVA? Yes    Current regimen:   Basal insulin: levemir 16 units BID   Prandial insulin: novolog 14-16 units TIDWM    Missed doses? occasional    Prior treatments:    Pioglitazone   Januvia   70/30 insulin   Metformin    Self-monitored glucose  # times a day testing: 3-4/day  Log reviewed: No    176 this morning recently.    400s often  Other times 100s.    Hypoglycemic events? 1/week lately. Down to 50s.    Current Symptoms  No   Yes  []    [x]  Polydipsia  []    [x]  Polyuria  []    [x]  Vision changes  [x]    []  Nausea  [x]    []  Diarrhea  [x]    []  Weight gain  [x]    []  Weight loss  Neuropathy  Back, neck pains. Seeing pain management.    Diabetes Management Status    Statin: Not taking. On repatha.  ACE/ARB: Not taking    Screening or Prevention Patient's value Goal Complete/Controlled?   HgA1C Testing and Control   Lab Results   Component Value Date    HGBA1C 11.2 (H) 09/30/2022      q6months/Less than 8% No     Lipid profile : 09/30/2022 Annually Yes     LDL control Lab Results   Component Value Date    LDLCALC 91.0 09/30/2022    Annually/Less than 100 mg/dl  yes     Nephropathy screening Lab Results   Component Value Date    MICALBCREAT 65.8 (H) 09/30/2022     Lab Results   Component Value Date    CREATININE 1.4 09/30/2022     Lab Results   Component Value Date    PROTEINUA Negative 09/30/2022    Annually Yes     Blood pressure BP Readings from Last 1 Encounters:   10/07/22 138/66    Less than 140/90 Yes     Dilated retinal exam : 08/23/2021 Annually No     Foot exam   Most Recent Foot Exam Date: Not  Found Annually No     Reviewed past medical, family, social history and updated as appropriate.    Review of Systems  As above    Objective:     Vitals:    10/07/22 1027   BP: 138/66   Pulse: 72     BP Readings from Last 5 Encounters:   10/07/22 138/66   09/30/22 (!) 148/80   09/29/22 (!) 158/78   09/27/22 (!) 168/70   08/11/22 (!) 164/81     Physical Exam  Vitals reviewed.   Constitutional:       General: She is not in acute distress.     Appearance: She is obese.   Cardiovascular:      Heart sounds: Normal heart sounds.   Pulmonary:      Effort: Pulmonary effort is normal.   Musculoskeletal:         General: Swelling present.       Wt Readings from Last 10 Encounters:   10/07/22 1027 84.4 kg (186 lb)   09/29/22 1509 86.3 kg (190 lb 4.8 oz)   09/27/22 1336 82.6 kg (182 lb)   09/08/22 1431 85 kg (187 lb 6.3 oz)   08/11/22 1443 83 kg (182 lb 15.7 oz)   07/01/22 1321 83.9 kg (184 lb 15.5 oz)   06/27/22 1506 83.9 kg (185 lb)   06/20/22 1549 82.9 kg (182 lb 12.2 oz)   05/06/22 0800 83.9 kg (185 lb)   05/05/22 0811 84.2 kg (185 lb 10 oz)   05/04/22 1145 81.4 kg (179 lb 8 oz)   05/03/22 1100 81.4 kg (179 lb 8 oz)   04/27/22 1541 81.4 kg (179 lb 8 oz)     Lab Results   Component Value Date    HGBA1C 11.2 (H) 09/30/2022     Lab Results   Component Value Date    CHOL 171 09/30/2022    HDL 38 (L) 09/30/2022    LDLCALC 91.0 09/30/2022    TRIG 210 (H) 09/30/2022    CHOLHDL 22.2 09/30/2022     Lab Results   Component Value Date     09/30/2022    K 4.5 09/30/2022     09/30/2022    CO2 22 (L) 09/30/2022     (H) 09/30/2022    BUN 16 09/30/2022    CREATININE 1.4 09/30/2022    CALCIUM 9.4 09/30/2022    PROT 7.2 09/30/2022    ALBUMIN 3.6 09/30/2022    BILITOT 0.2 09/30/2022    ALKPHOS 75 09/30/2022    AST 20 09/30/2022    ALT 17 09/30/2022    ANIONGAP 11 09/30/2022    ESTGFRAFRICA 42 (A) 07/14/2022    EGFRNONAA 37 (A) 07/14/2022    TSH 2.432 09/30/2022        Assessment/Plan:     Type 2 diabetes mellitus with  hyperglycemia  Complicated by hyperglycemia, sometimes hypoglycemia, neuropathy, CAD, CKD, pt also notes hx CVA, chart mentioned hx retinopathy.    Reviewed goals of therapy - to get the best control we can without hypoglycemia. Goal A1C <8 given age, comorbidities   last a1c remains above goal    Refer to education, recommend yearly DM education    Glucose reports with wide variability. 50s-400s. No log, so unable to determine patterns.  Pt/family denies missed doses of mealtime insulins.    Medication changes:   Continue same for now.    Reviewed patient's current insulin regimen. Clarified proper insulin dose and timing in relation to meals, etc. Insulin injection sites and proper rotation instructed.     -Advised frequent self blood glucose monitoring. Patient encouraged to document glucose results and bring them to every clinic visit    -Hypoglycemia precautions discussed. Instructed on precautions before driving.    -Close adherence to lifestyle changes recommended.    -Periodic follow ups for eye evaluations, foot care suggested.    Try for professional CGM as well as DM education, that can hopefully help fine tune some insulin doses.   otherwise could consider DPP4 vs GLP1 agonist if covered okay. Options limited by CKD.      Age-related osteoporosis without current pathological fracture  Discussed with pt diagnosis of osteoporosis.     Reviewed basics of bone quantity versus quality  Reassuring she is not fracturing     Risks include  race, menopause, hx smoking, ETOH,  +FH, PPI therapy    Recommend pt have sufficient calcium (8026-7229 mg/day in divided doses) and vitamin D (2000 units a day), calcium from food sources preferred but OTC supplementation okay if needed to reach goal amounts.  Fall precautions emphasized  Encourage weight bearing exercises as tolerated    Treatment options limited by CKD. And comorbidities.  With how low BMD is, and how high FRAX is, recommend anabolic agent. To  build up bone density a bit, then use other agent after that to maintain bone density at that higher level     avoid evenity with cardiac/stroke hx.   leaves forteo/tymlos. Try for forteo if covered okay. Once a day shot for a year or two, then prolia after that      Class 2 severe obesity with serious comorbidity and body mass index (BMI) of 36.0 to 36.9 in adult  Body mass index is 36.33 kg/m².   complicated by HTN, HLD, diabetes   encourage pt to work on healthy diet, increase exercise as tolerated.   monitor weight      40 minutes of total time spent on the encounter, which includes face to face time and non-face to face time preparing to see the patient (eg, review of tests), Obtaining and/or reviewing separately obtained history, Documenting clinical information in the electronic or other health record, Independently interpreting results (not separately reported) and communicating results to the patient/family/caregiver, or Care coordination (not separately reported).      Follow up in about 4 months (around 2/7/2023) for lab review, further monitoring.      Adeel Tanner MD  Endocrinology

## 2022-10-07 NOTE — ASSESSMENT & PLAN NOTE
Discussed with pt diagnosis of osteoporosis.     Reviewed basics of bone quantity versus quality  Reassuring she is not fracturing     Risks include  race, menopause, hx smoking, ETOH,  +FH, PPI therapy    Recommend pt have sufficient calcium (1977-3751 mg/day in divided doses) and vitamin D (2000 units a day), calcium from food sources preferred but OTC supplementation okay if needed to reach goal amounts.  Fall precautions emphasized  Encourage weight bearing exercises as tolerated    Treatment options limited by CKD. And comorbidities.  With how low BMD is, and how high FRAX is, recommend anabolic agent. To build up bone density a bit, then use other agent after that to maintain bone density at that higher level     avoid evenity with cardiac/stroke hx.   leaves forteo/tymlos. Try for forteo if covered okay. Once a day shot for a year or two, then prolia after that

## 2022-10-07 NOTE — TELEPHONE ENCOUNTER
Oma, this is Harvinder Flor with [facility].  We are working on your prescription that your doctor has sent us. We will be working with your insurance to get this approved for you. We will be calling you along the way with updates on your medication.  If you have any questions, you can reach us at [phone].    Welcome call outcome: Patient/caregiver reached Spoke with Esha (daughter)

## 2022-10-09 RX ORDER — FUROSEMIDE 40 MG/1
40 TABLET ORAL DAILY
Qty: 30 TABLET | Refills: 11 | Status: CANCELLED | OUTPATIENT
Start: 2022-10-09 | End: 2023-10-09

## 2022-10-10 ENCOUNTER — SPECIALTY PHARMACY (OUTPATIENT)
Dept: PHARMACY | Facility: CLINIC | Age: 77
End: 2022-10-10
Payer: MEDICARE

## 2022-10-10 DIAGNOSIS — M81.0 AGE-RELATED OSTEOPOROSIS WITHOUT CURRENT PATHOLOGICAL FRACTURE: Primary | ICD-10-CM

## 2022-10-10 NOTE — TELEPHONE ENCOUNTER
Specialty Pharmacy - Initial Clinical Assessment    Specialty Medication Orders Linked to Encounter      Flowsheet Row Most Recent Value   Medication #1 teriparatide 20 mcg/dose (620mcg/2.48mL) PnIj (Order#[id number], Rx#[id number])          Patient Diagnosis   M81.0 - Age-related osteoporosis without current pathological fracture    Subjective    Indira Waters is a 77 y.o. female, who is followed by the specialty pharmacy service for management and education.    Recent Encounters       Date Type Provider Description    10/10/2022 Specialty Pharmacy Goldy Rodriguez PharmD Initial Clinical Assessment    10/07/2022 Specialty Pharmacy Harvinder Flor PharmD Referral Authorization    10/03/2022 Specialty Pharmacy Goldy Rodriguez PharmD Initial Clinical Assessment    10/03/2022 Specialty Pharmacy Goldy Rodriguez PharmD Refill Coordination    09/30/2022 Specialty Pharmacy Goldy Rodriguez PharmD Referral Authorization          Clinical call attempts since last clinical assessment   No call attempts found.     Current Outpatient Medications   Medication Sig    acetaminophen (TYLENOL) 500 MG tablet Take 2 tablets (1,000 mg total) by mouth every 8 (eight) hours as needed for Pain.    allopurinoL (ZYLOPRIM) 300 MG tablet Take 1 tablet (300 mg total) by mouth once daily.    aspirin (ECOTRIN) 81 MG EC tablet Take 1 tablet (81 mg total) by mouth once daily.    blood sugar diagnostic Strp 1 each by Misc.(Non-Drug; Combo Route) route 4 (four) times daily.    blood-glucose meter Misc Follow package directions (Patient taking differently: Follow package directions)    blood-glucose meter,continuous (DEXCOM G6 ) Misc 1 each by Misc.(Non-Drug; Combo Route) route continuous prn.    blood-glucose sensor (DEXCOM G6 SENSOR) Nicole 3 each by Misc.(Non-Drug; Combo Route) route continuous prn. Change every 10 days.    blood-glucose transmitter (DEXCOM G6 TRANSMITTER) Nicole 1 each by Misc.(Non-Drug; Combo Route) route  "continuous prn.    cloNIDine (CATAPRES) 0.1 MG tablet Take 1 tablet (0.1 mg total) by mouth 2 (two) times daily.    clopidogreL (PLAVIX) 75 mg tablet Take 1 tablet (75 mg total) by mouth once daily.    evolocumab (REPATHA SURECLICK) 140 mg/mL PnIj Inject 1 mL (140 mg total) into the skin every 14 (fourteen) days.    fenofibrate micronized (LOFIBRA) 134 MG Cap Take 1 capsule (134 mg total) by mouth daily with breakfast.    furosemide (LASIX) 40 MG tablet Take 1 tablet (40 mg total) by mouth once daily.    HYDROcodone-acetaminophen (NORCO)  mg per tablet Take 1 tablet by mouth every 8 (eight) hours as needed for Pain.    insulin aspart U-100 (NOVOLOG) 100 unit/mL (3 mL) InPn pen Inject 12 Units into the skin 3 (three) times daily with meals. Plus correction scale. Max TDD 40units.    insulin detemir U-100 (LEVEMIR FLEXTOUCH U-100 INSULN) 100 unit/mL (3 mL) InPn pen Inject 20 Units into the skin 2 (two) times daily.    ipratropium-albuteroL (COMBIVENT)  mcg/actuation inhaler Inhale 1 puff into the lungs by mouth every 6 (six) hours.    losartan (COZAAR) 50 MG tablet Take 1 tablet (50 mg total) by mouth once daily.    methocarbamoL (ROBAXIN) 500 MG Tab Take 1 tablet (500 mg total) by mouth 3 (three) times daily as needed (muscle spasm).    metoclopramide HCl (REGLAN) 5 MG tablet Take 1 tablet (5 mg total) by mouth 4 (four) times daily as needed (nausea, prevention).    metoprolol succinate (TOPROL-XL) 100 MG 24 hr tablet Take 1 tablet (100 mg total) by mouth once daily.    NIFEdipine (PROCARDIA-XL) 30 MG (OSM) 24 hr tablet Take 1 tablet (30 mg total) by mouth 2 (two) times a day.    nitroGLYCERIN (NITROSTAT) 0.4 MG SL tablet Place 1 tablet (0.4 mg total) under the tongue every 5 (five) minutes as needed for Chest pain.    pantoprazole (PROTONIX) 40 MG tablet Take 1 tablet (40 mg total) by mouth once daily.    pen needle, diabetic (BD ULTRA-FINE HIEN PEN NEEDLE) 32 gauge x 5/32" Ndle 1 each by Misc.(Non-Drug; " Combo Route) route 4 (four) times daily.    pregabalin (LYRICA) 150 MG capsule Take 1 capsule (150 mg total) by mouth 3 (three) times daily.    senna-docusate 8.6-50 mg (PERICOLACE) 8.6-50 mg per tablet Take 1 tablet by mouth 2 (two) times daily as needed for Constipation. (Patient not taking: Reported on 9/30/2022)    sertraline (ZOLOFT) 100 MG tablet Take 1 tablet (100 mg total) by mouth once daily.    teriparatide 20 mcg/dose (620mcg/2.48mL) PnIj Inject 20 mcg into the skin once daily.    triamcinolone acetonide 0.1% (KENALOG) 0.1 % cream Apply to affected areas of body twice daily as needed rash. Do not use on face, underarms, or groin.    TRUEPLUS LANCETS 30 gauge Misc TEST AS DIRECTED FOUR TIMES DAILY   Last reviewed on 10/7/2022 10:38 AM by Adeel Tanner MD    Review of patient's allergies indicates:   Allergen Reactions    Bleach (sodium hypochlorite) Shortness Of Breath    Nitrofurantoin macrocrystalline Anaphylaxis    Lipitor [atorvastatin] Diarrhea and Rash    Nsaids (non-steroidal anti-inflammatory drug)     Pcn [penicillins]     Toradol [ketorolac]    Last reviewed on  10/7/2022 10:38 AM by Adeel Tanner    Drug Interactions    Drug interactions evaluated: yes  Clinically relevant drug interactions identified: no  Provided the patient with educational material regarding drug interactions: not applicable         Adverse Effects    Unable to perform a full ROS: Yes   Reason: other          Assessment Questions - Documented Responses      Flowsheet Row Most Recent Value   Assessment    Medication Reconciliation completed for patient No  [patient's daughter declined]   Status of the patients ability to self-administer: Is Able   All education points have been covered with patient? No, patient declined- printed education provided   Welcome packet contents reviewed and discussed with patient? No  [existing to [facility]]   Assesment completed? No  [patient's daughter declined]   Plan Therapy being initiated   Do  you need to open a clinical intervention (i-vent)? No   Do you want to schedule first shipment? Yes   Medication #1 Assessment Info    Patient status New medication, Exisiting to [facility]   Is this medication appropriate for the patient? Yes   Is this medication effective? Not yet started          Refill Questions - Documented Responses      Flowsheet Row Most Recent Value   Patient Availability and HIPAA Verification    Does patient want to proceed with activity? Yes   HIPAA/medical authority confirmed? Yes   Relationship to patient of person spoken to? Child   Refill Screening Questions    When does the patient need to receive the medication? 10/13/22   Refill Delivery Questions    How will the patient receive the medication? [facility]   When does the patient need to receive the medication? 10/13/22   Shipping Address Home   Address in [facility] confirmed and updated if neccessary? Yes   Expected Copay ($) 0   Is the patient able to afford the medication copay? Yes   Payment Method zero copay   Days supply of Refill 28   Supplies needed? Alcohol Swabs, Pen needles   Refill activity completed? Yes   Refill activity plan Refill scheduled   Shipment/Pickup Date: 10/12/22            Objective    She has a past medical history of Arthritis, Back pain, Cancer, Depression, Diabetes mellitus, Fibromyalgia, Hyperlipidemia, Hypertension, Lupus, and Stroke.    Tried/failed medications: bisphosphonate    BP Readings from Last 4 Encounters:   10/07/22 138/66   09/30/22 (!) 148/80   09/29/22 (!) 158/78   09/27/22 (!) 168/70     Ht Readings from Last 4 Encounters:   10/07/22 5' (1.524 m)   09/30/22 5' (1.524 m)   09/29/22 5' (1.524 m)   09/27/22 5' (1.524 m)     Wt Readings from Last 4 Encounters:   10/07/22 84.4 kg (186 lb)   09/29/22 86.3 kg (190 lb 4.8 oz)   09/27/22 82.6 kg (182 lb)   09/08/22 85 kg (187 lb 6.3 oz)       Assessment/Plan  Patient plans to start therapy on 10/13/22      Indication, dosage, appropriateness,  effectiveness, safety and convenience of her specialty medication(s) were reviewed today.     Patient Education   Pharmacist offer to  patient was declined. Printed educational materials will be provided with medication.  Patient did accept verbal education on the following topics:  · Proper use, timely administration, and missed dose management  · Duration of therapy  · Side effects, including prevention, minimization, and management  · Contraindications and safety precautions  · Storage, safe handling, and disposal    Patient's daughter declined full Teriparatide consult. Was able to review the above information. She did not want to review injection technique, stating patient is comfortable self-injections. Takes insulin and Repatha. No questions or concerns at this time.     Tasks added this encounter   11/3/2022 - Refill Call (Auto Added)  7/10/2023 - Clinical - Follow Up Assesement (Annual)   Tasks due within next 3 months   No tasks due.     Goldy Rodriguez, PharmD  [facility]  [address]  [facility] [address]  Phone: [phone]  Fax: [phone]

## 2022-10-12 ENCOUNTER — OFFICE VISIT (OUTPATIENT)
Dept: PAIN MEDICINE | Facility: CLINIC | Age: 77
End: 2022-10-12
Payer: MEDICARE

## 2022-10-12 VITALS
TEMPERATURE: 98 F | SYSTOLIC BLOOD PRESSURE: 152 MMHG | BODY MASS INDEX: 34.08 KG/M2 | HEIGHT: 62 IN | DIASTOLIC BLOOD PRESSURE: 73 MMHG | HEART RATE: 76 BPM | WEIGHT: 185.19 LBS | RESPIRATION RATE: 18 BRPM

## 2022-10-12 DIAGNOSIS — G89.4 CHRONIC PAIN DISORDER: ICD-10-CM

## 2022-10-12 DIAGNOSIS — E11.42 DIABETIC PERIPHERAL NEUROPATHY: ICD-10-CM

## 2022-10-12 DIAGNOSIS — R26.9 GAIT ABNORMALITY: ICD-10-CM

## 2022-10-12 DIAGNOSIS — E08.42 DIABETIC POLYNEUROPATHY ASSOCIATED WITH DIABETES MELLITUS DUE TO UNDERLYING CONDITION: ICD-10-CM

## 2022-10-12 DIAGNOSIS — M79.7 FIBROMYALGIA: Primary | ICD-10-CM

## 2022-10-12 DIAGNOSIS — G89.4 CHRONIC PAIN SYNDROME: ICD-10-CM

## 2022-10-12 DIAGNOSIS — M79.2 NEUROPATHIC PAIN: ICD-10-CM

## 2022-10-12 PROCEDURE — 99215 OFFICE O/P EST HI 40 MIN: CPT | Mod: PBBFAC | Performed by: NURSE PRACTITIONER

## 2022-10-12 PROCEDURE — 99999 PR PBB SHADOW E&M-EST. PATIENT-LVL V: ICD-10-PCS | Mod: PBBFAC,,, | Performed by: NURSE PRACTITIONER

## 2022-10-12 PROCEDURE — 99999 PR PBB SHADOW E&M-EST. PATIENT-LVL V: CPT | Mod: PBBFAC,,, | Performed by: NURSE PRACTITIONER

## 2022-10-12 PROCEDURE — 99215 PR OFFICE/OUTPT VISIT, EST, LEVL V, 40-54 MIN: ICD-10-PCS | Mod: S$PBB,,, | Performed by: NURSE PRACTITIONER

## 2022-10-12 PROCEDURE — 99215 OFFICE O/P EST HI 40 MIN: CPT | Mod: S$PBB,,, | Performed by: NURSE PRACTITIONER

## 2022-10-12 RX ORDER — HYDROCODONE BITARTRATE AND ACETAMINOPHEN 10; 325 MG/1; MG/1
1 TABLET ORAL EVERY 8 HOURS PRN
Qty: 90 TABLET | Refills: 0 | Status: SHIPPED | OUTPATIENT
Start: 2022-10-12 | End: 2022-11-08 | Stop reason: SDUPTHER

## 2022-10-12 RX ORDER — PREGABALIN 150 MG/1
150 CAPSULE ORAL 3 TIMES DAILY
Qty: 90 CAPSULE | Refills: 2 | Status: SHIPPED | OUTPATIENT
Start: 2022-10-12 | End: 2023-01-02 | Stop reason: SDUPTHER

## 2022-10-12 RX ORDER — METHOCARBAMOL 500 MG/1
500 TABLET, FILM COATED ORAL 3 TIMES DAILY PRN
Qty: 90 TABLET | Refills: 2 | Status: SHIPPED | OUTPATIENT
Start: 2022-10-12 | End: 2023-01-02 | Stop reason: SDUPTHER

## 2022-10-12 NOTE — PROGRESS NOTES
Chronic patient Established Note (Follow up visit)    Interval History 10/12/2022:  Mrs Waters presents for follow up of chronic neuropathy. She is s/p qutenza patch placement with improved functioning and neuropathy control. Unfortunately her A1C was above 11 which inhibits her from Nevro SCS trial at this time. She is eager to have SCS trial. She denies new areas of pain or neurological changes. She continued to take  Norco 10/325mg TID, Robaxin 500mg and Lyrica 150mg TID with benefit and denies significant SE of medications.     Interval History 9/8/2022:  Mrs Waters presents for follow up of chronic lower back painn and radicular pain in conjunction with PDN to bilateral feet. She is doing fair with medication mgt of Norco, Robaxin, and Lyrica and does need refill at this time. She denies SE of medications. She is patiently awaiting her A1C to become lower than 10 to proceed with SCS trial.     Interval History 8/12/2022:  Mrs Wtaers presents for delayed FU. She has had continuous pain to lumbar and radicular pain but also peripheral neuropathy complaints. Over interval she has had CVA but doing fair. Her A1C has unfortunately elevated above 10 at this time and SCS trial placed on hold but phychiatric evaluation complete. She continues to take Norco 10/325mg TID, Robaxin 500mg TID and Lyrica 150mg TID with some benefit and denies SE of medications. No s/s concerning for cauda equina.     Interval History 4/12/2022:  Patient returns to clinic today for follow-up. Her neuropathic pain continues to be an issue for her, but she says that she is able to manage. She is taking Norco 10-325mg TID, Robaxin 500mg TID, and Lyrica 150mg TID without any adverse side effects. She denies any changes in her symptoms. She would like to proceed with SCS trial. Discussed last time that her HbA1c should be <10, was 9.8 on most recent labs. Seen by psychology and cleared for SCS.     Interval History 2/14/2022:  Mrs Waters presents for  follow up. Pt states overall neuropathy continues. Since last visit she has been stable with medication mgt and takign Norco 10/325mg TID, Robaxin 500mg TID and Lyrica 150mg TID. She denies new areas of pain or neurological changes. Medication adequate to control pain without adverse SE.      Interval History 12/21/2021:  Pt presents for urgent evaluation s/p fall in kitchen last night. Pt unsure of LOC or head trauma but states some amnesia of events. Pt is having left knee pain, B ankle pain L>R and lower back/buttock pain. Daughter further states tremor like activity last night. During visit daughter asked for bedside commode due to inability to ambulate.  Pt during visit appeared somnolent, pale and began vomiting. Discussed going to ER for further evaluation.      Interval History 12/14/2021:  Mrs Waters presents for follow up of chronic pain complaints. She is hopeful she will have A1C lower soon to move forward with SCS. She is doing fair with medication mgt alone at this time and denies SE of medications. Pt is currently taking Norco 10/325mg TID PRN, Lyrica 150mg TID, and Robaxin 500mg TID. She denies new areas of pain or neurological changes.      Interval History 10/14/2021:  The Pt presents for follow up and s/p B Lumbar sympathetic blocks. Pt states this has done well but ready to proceed with SCS trial. She is aware A1C must be under tight control and Pt and daughter re-iterate knowing this. Pt also mentions DKA admission and diagnosis of vasculitis as well over interval. Pt continues to take hydrocodone 10/325 mg TID PRN, Lyrica 150 mg TID, and Robaxin 500mg TID. She denies any SE of medication, denies new neurological changes.      Interval Hx 09/16/2021  Indira Waters presents to the clinic for a follow-up appointment for f/u after bilateral lumbar sympathetic block on 8/25/21.Patient reports >50% relief after lumbar sympathetic block that lasted 2 weeks when she then developed a UTI/DKA, was  admitted to the hospital and pain recurred.  Current pain intensity is 8/10.     Interval History 8/12/2021:  Indira Waters presents to the clinic for a follow-up appointment for BLE diabetic neuropathy, painful. Continues with Norco 10/325mg, Lyrica 150mg TID, Robaxin 750mg daily PRN. She had to cancel previously scheduled lumbar sympathetic block 2/2 lithotripsy for painful kidney stones, which have now passed. She still has intermittent pain with urination but overall that pain is improving. She had to cancel previous angiogram for this same reason - this has not been rescheduled yet. She denies any change in her LE pain. Denies any new neurological sxs in BLEs.     Interval History 6/10/21:  Indira Waters presents to the clinic for a 2 week follow-up appointment from lumbar sympathetic nerve block in early May. She reports minimal relief from this intervention, but did note that it helped some, briefly. Since the last visit, Indira Waters states the pain has been persistant. Current pain intensity is 10/10. Patient has chronic generalized diffuse pain, most pronounced in her BL lower extremities 2/2 diabetic neuropathy. HSe states that the pain is a little better than at her last visit. Most days are 10/10, but some days are better than others. Her pain is aggravated by exertion, walking, or sitting/standing for extended periods of time. He pain is mildly improved by rest and medications. She is currently taking Lyrica 150 PO TID, Zoloft, and Norco 10-325mg TID PRN. She states that the pain meds are helping but she is still constantly in pain and unable to participate in her ADLs. She has now established care with PCP for assistance w/ poorly controlled T2DM, last A1c 11.4. She has not yet established care w/ Rheumatology for fibromyalgia management.    Interval HPI 4/15/21:  Patient returns for follow up of chronic generalized diffuse pain. At the last visit, had EMG (not yet completed), lumbar and hip  "x-ray imaging ordered. She was also referred to Rheumatology for fibromyalgia management and referral to PCP for DM2 management and weaning of zoloft for transition to cymbalta for treatment of neuropathy. She had her Lyrica increased to 150mg PO TID, and prescribed Norco 10mg TID with opioid contract signed. She has been taking Norco 10mg TID and it has been helping her "bad" pains but does not take all of her pain away. She has not yet been set up with her new PCP or rheumatologist yet for fibromyalgia. Still continues to have generalized pain everywhere including feet, hips, legs, lower and upper back, neck and shoulder pain. Continues to have neuropathy in the bilateral lower extremities due to uncontrolled DM2.    Initial Visit 3/11/21:  Indira Waters presents to the clinic for the evaluation of chronic pain. Complaining of pain everywhere including feet, legs, hips, lower and upper back, neck, shoulder. Pain started 5+ years ago. Pain 10/10 at worse. She was referred here by her PCP Dr. Ramos who she has been seeing for over 6 years. She states her pain is aggravated by any physical activity/movement. She takes lyrica, robaxin, and Norco 10 TID with mild relief of pain. Per patient, she was referred here by her PCP for medication refill. She has not tried physical therapy for several years, she states it did not help last time she was in PT. She says she is trying to exercise daily by doing yoga. She walks with a walker. She has numerous comorbidities including DM2 (Last A1C 9+ per prior family medicine note in Jan 2021), fibromyalgia, lupus, DDD. She states she would like to find a new PCP as she no longer lives near her old one. Patient denies night fever/night sweats, urinary incontinence, bowel incontinence and significant weight loss.      Pain Disability Index Review:  Last 3 PDI Scores 10/12/2022 9/8/2022 8/11/2022   Pain Disability Index (PDI) 40 35 40     Pain Medications:  Norco 10-325mg TID, Robaxin " 500mg TID, and Lyrica 150mg TID    Opioid Contract: yes     report:  Reviewed and consistent with medication use as prescribed.    Pain Procedures:    - reports possible back injection 10+ years ago  - Lumbar sympathetic nerve block 05/05/21 - Dr. Pereira - minimal relief    Physical Therapy/Home Exercise: no     Imaging:   Hip X-ray 4/7/21  FINDINGS:  Osseous structures appear intact without evidence of fracture or osseous destructive process.  No apparent dislocation.     Modest degenerative change or significant joint space narrowing.     Lumbar X-ray 4/7/21  FINDINGS:  Diffuse bony demineralization.  Vertebral bodies are normal in height without evidence of fracture.  No pars defects.     Normal sagittal alignment is preserved.  No spondylolisthesis. No abnormal translation with flexion and extension.     Intervertebral disc heights are well maintained.  Mild facet arthropathy in the lower lumbar spine.     Mild scattered vascular calcification.     Impression:     No evidence of fracture or malalignment.     Mild facet arthropathy in the lower lumbar spine.     Diffuse bony demineralization.  Consider correlation with DEXA.    Allergies:   Review of patient's allergies indicates:   Allergen Reactions    Bleach (sodium hypochlorite) Shortness Of Breath    Nitrofurantoin macrocrystalline Anaphylaxis    Lipitor [atorvastatin] Diarrhea and Rash    Nsaids (non-steroidal anti-inflammatory drug)     Pcn [penicillins]     Toradol [ketorolac]        Current Medications:   Current Outpatient Medications   Medication Sig Dispense Refill    acetaminophen (TYLENOL) 500 MG tablet Take 2 tablets (1,000 mg total) by mouth every 8 (eight) hours as needed for Pain.  0    allopurinoL (ZYLOPRIM) 300 MG tablet Take 1 tablet (300 mg total) by mouth once daily. 90 tablet 1    aspirin (ECOTRIN) 81 MG EC tablet Take 1 tablet (81 mg total) by mouth once daily. 30 tablet 0    blood sugar diagnostic Strp 1 each by Misc.(Non-Drug;  Combo Route) route 4 (four) times daily. 200 each 0    blood-glucose meter Misc Follow package directions (Patient taking differently: Follow package directions) 1 each 0    blood-glucose meter,continuous (DEXCOM G6 ) Misc 1 each by Misc.(Non-Drug; Combo Route) route continuous prn. 1 each PRN    blood-glucose sensor (DEXCOM G6 SENSOR) Nicole 3 each by Misc.(Non-Drug; Combo Route) route continuous prn. Change every 10 days. 3 each PRN    blood-glucose transmitter (DEXCOM G6 TRANSMITTER) Nicole 1 each by Misc.(Non-Drug; Combo Route) route continuous prn. 1 each PRN    cloNIDine (CATAPRES) 0.1 MG tablet Take 1 tablet (0.1 mg total) by mouth 2 (two) times daily. 180 tablet 0    clopidogreL (PLAVIX) 75 mg tablet Take 1 tablet (75 mg total) by mouth once daily. 30 tablet 3    evolocumab (REPATHA SURECLICK) 140 mg/mL PnIj Inject 1 mL (140 mg total) into the skin every 14 (fourteen) days. 8 each 3    fenofibrate micronized (LOFIBRA) 134 MG Cap Take 1 capsule (134 mg total) by mouth daily with breakfast. 90 capsule 3    HYDROcodone-acetaminophen (NORCO)  mg per tablet Take 1 tablet by mouth every 8 (eight) hours as needed for Pain. 90 tablet 0    insulin aspart U-100 (NOVOLOG) 100 unit/mL (3 mL) InPn pen Inject 12 Units into the skin 3 (three) times daily with meals. Plus correction scale. Max TDD 40units. 15 mL 3    insulin detemir U-100 (LEVEMIR FLEXTOUCH U-100 INSULN) 100 unit/mL (3 mL) InPn pen Inject 20 Units into the skin 2 (two) times daily. 15 mL 3    ipratropium-albuteroL (COMBIVENT)  mcg/actuation inhaler Inhale 1 puff into the lungs by mouth every 6 (six) hours. 4 g 0    methocarbamoL (ROBAXIN) 500 MG Tab Take 1 tablet (500 mg total) by mouth 3 (three) times daily as needed (muscle spasm). 90 tablet 0    metoclopramide HCl (REGLAN) 5 MG tablet Take 1 tablet (5 mg total) by mouth 4 (four) times daily as needed (nausea, prevention). 30 tablet 3    metoprolol succinate (TOPROL-XL) 100 MG 24 hr tablet  "Take 1 tablet (100 mg total) by mouth once daily. 30 tablet 1    nitroGLYCERIN (NITROSTAT) 0.4 MG SL tablet Place 1 tablet (0.4 mg total) under the tongue every 5 (five) minutes as needed for Chest pain. 25 tablet 5    pantoprazole (PROTONIX) 40 MG tablet Take 1 tablet (40 mg total) by mouth once daily. 30 tablet 0    pen needle, diabetic (BD ULTRA-FINE HIEN PEN NEEDLE) 32 gauge x 5/32" Ndle 1 each by Misc.(Non-Drug; Combo Route) route 4 (four) times daily. 400 each 1    pregabalin (LYRICA) 150 MG capsule Take 1 capsule (150 mg total) by mouth 3 (three) times daily. 90 capsule 2    senna-docusate 8.6-50 mg (PERICOLACE) 8.6-50 mg per tablet Take 1 tablet by mouth 2 (two) times daily as needed for Constipation. 60 tablet 0    sertraline (ZOLOFT) 100 MG tablet Take 1 tablet (100 mg total) by mouth once daily. 90 tablet 3    teriparatide 20 mcg/dose (620mcg/2.48mL) PnIj Inject 20 mcg into the skin once daily. 2.48 mL 11    triamcinolone acetonide 0.1% (KENALOG) 0.1 % cream Apply to affected areas of body twice daily as needed rash. Do not use on face, underarms, or groin. 454 g 0    TRUEPLUS LANCETS 30 gauge Misc TEST AS DIRECTED FOUR TIMES DAILY 200 each 2    furosemide (LASIX) 40 MG tablet Take 1 tablet (40 mg total) by mouth once daily. 30 tablet 11    losartan (COZAAR) 50 MG tablet Take 1 tablet (50 mg total) by mouth once daily. 90 tablet 0    NIFEdipine (PROCARDIA-XL) 30 MG (OSM) 24 hr tablet Take 1 tablet (30 mg total) by mouth 2 (two) times a day. 180 tablet 0     No current facility-administered medications for this visit.       REVIEW OF SYSTEMS:    GENERAL:  No weight loss, malaise or fevers.  HEENT:  Negative for frequent or significant headaches.  NECK:  Negative for lumps, goiter, pain and significant neck swelling.  RESPIRATORY:  Negative for cough, wheezing or shortness of breath.  CARDIOVASCULAR:  Negative for chest pain, leg swelling or palpitations.  GI:  Negative for abdominal discomfort, blood in " stools or black stools or change in bowel habits.  MUSCULOSKELETAL:  See HPI.  SKIN:  Negative for lesions, rash, and itching.  PSYCH:  Negative for sleep disturbance, mood disorder and recent psychosocial stressors.  HEMATOLOGY/LYMPHOLOGY:  Negative for prolonged bleeding, bruising easily or swollen nodes.  NEURO:   No history of headaches, syncope, paralysis, seizures or tremors.  All other reviewed and negative other than HPI.    Past Medical History:  Past Medical History:   Diagnosis Date    Arthritis     Back pain     Cancer     ovarian    Depression     Diabetes mellitus     Fibromyalgia     Hyperlipidemia     Hypertension     Lupus     Stroke     slight left sided weakness       Past Surgical History:  Past Surgical History:   Procedure Laterality Date    APPENDECTOMY       SECTION      CORONARY ANGIOGRAPHY N/A 2022    Procedure: ANGIOGRAM, CORONARY ARTERY;  Surgeon: Jose L Méndez MD;  Location: [facility] CATH LAB;  Service: Cardiology;  Laterality: N/A;    HYSTERECTOMY      INJECTION OF ANESTHETIC AGENT AROUND NERVE Bilateral 2021    Procedure: BLOCK, NERVE, SYMPATHIC;  Surgeon: Holden Pereira MD;  Location: [facility] PAIN MGT;  Service: Pain Management;  Laterality: Bilateral;    INJECTION OF ANESTHETIC AGENT AROUND NERVE N/A 2021    Procedure: BLOCK, NERVE, SYMPATHETIC  need consent;  Surgeon: Holden Pereira MD;  Location: [facility] PAIN MGT;  Service: Pain Management;  Laterality: N/A;    YARED LINK,SWALLOW FUNCTION,CINE/VIDEO RECORD  2021         TONSILLECTOMY         Family History:  Family History   Problem Relation Age of Onset    COPD Mother     Lupus Mother     Hernia Mother     Uterine cancer Mother         vs cervical cancer    Ovarian cancer Mother     Diabetes Father     Coronary artery disease Father     Colon cancer Maternal Grandmother         in her 50's       Social History:  Social History     Socioeconomic History    Marital status:    Tobacco Use    Smoking  "status: Former     Types: Cigarettes     Quit date: 2020     Years since quittin.9    Smokeless tobacco: Never   Substance and Sexual Activity    Alcohol use: No    Drug use: No    Sexual activity: Not Currently     Social Determinants of Health     Financial Resource Strain: Low Risk     Difficulty of Paying Living Expenses: Not hard at all   Food Insecurity: No Food Insecurity    Worried About Running Out of Food in the Last Year: Never true    Ran Out of Food in the Last Year: Never true   Transportation Needs: No Transportation Needs    Lack of Transportation (Medical): No    Lack of Transportation (Non-Medical): No   Physical Activity: Insufficiently Active    Days of Exercise per Week: 3 days    Minutes of Exercise per Session: 20 min   Stress: No Stress Concern Present    Feeling of Stress : Not at all   Social Connections: Socially Isolated    Frequency of Communication with Friends and Family: More than three times a week    Frequency of Social Gatherings with Friends and Family: Never    Attends Zoroastrian Services: Never    Active Member of Clubs or Organizations: No    Attends Club or Organization Meetings: Never    Marital Status:    Housing Stability: Low Risk     Unable to Pay for Housing in the Last Year: No    Number of Places Lived in the Last Year: 1    Unstable Housing in the Last Year: No       OBJECTIVE:    BP (!) 152/73   Pulse 76   Temp 98.2 °F (36.8 °C)   Resp 18   Ht 5' 1.52" (1.563 m)   Wt 84 kg (185 lb 3 oz)   BMI 34.40 kg/m²     PHYSICAL EXAMINATION:    General appearance: Well appearing, in no acute distress, alert and oriented x3.  Psych:  Mood and affect appropriate.  Skin: Skin color, texture, turgor normal, no rashes or lesions, in both upper and lower body.  Head/face:  Atraumatic, normocephalic. No palpable lymph nodes  Cor: RRR  Pulm: Non-labored  Back: Straight leg raising in the sitting and supine positions is negative to radicular pain. No pain to palpation " over the spine or costovertebral angles.  Extremities: Peripheral joint ROM is full and pain free without obvious instability or laxity in all four extremities. No deformities, edema, or skin discoloration. Good capillary refill.  Musculoskeletal: Bilateral upper and lower extremity strength is normal and symmetric.  No atrophy or tone abnormalities are noted.  Neuro: Muscle stretch reflexes are diminished but symmetric. Altered sensation in BLE at baseline. Painful paresthesias in BLE.   Gait: Antalgic, uses cane for ambulation today     ASSESSMENT: 77 y.o. year old female with chronic pain, consistent with:     1. Fibromyalgia        2. Diabetic peripheral neuropathy        3. Chronic pain disorder        4. Neuropathic pain        5. Gait abnormality              PLAN:     - I have stressed the importance of physical activity and a home exercise plan to help with pain and improve health.  - Patient can continue with medications for now since they are providing benefits, using them appropriately, and without side effects.  - Psych evaluation complete but now waiting till A1C can drop below 10 again (recent was 11.2  - Qutenza patch tdid well for PDN   - When  HbA1c becomes <10, will proceed with SCS trial with Nevro for PDN  - UDS 2/14/22 reviewed.   - Continue Robaxin 500mg TID #90  - Refilled Norco 10/325mg TID #90   - Continue Lyrica 150mg TID #90  - RTC 8 weeks for re-evaluation of symptoms, monitor A1C and consider repeat Qutenza placement    - Counseled patient regarding the importance of activity modification, constant sleeping habits and physical therapy.    The above plan and management options were discussed at length with patient. Patient is in agreement with the above and verbalized understanding.    Colin Gibbs NP  10/12/2022    I spent a total of 40 minutes on the day of the visit.  This includes face to face time and non-face to face time preparing to see the patient by reviewing previous  labs/imaging, obtaining and/or reviewing separately obtained history, documenting clinical information in the electronic or other health record, independently interpreting results and communicating results to the patient/family/caregiver.

## 2022-10-24 ENCOUNTER — TELEPHONE (OUTPATIENT)
Dept: ENDOCRINOLOGY | Facility: CLINIC | Age: 77
End: 2022-10-24
Payer: MEDICARE

## 2022-10-24 DIAGNOSIS — D69.2 PALPABLE PURPURA: ICD-10-CM

## 2022-10-24 DIAGNOSIS — Z79.4 TYPE 2 DIABETES MELLITUS WITH HYPERGLYCEMIA, WITH LONG-TERM CURRENT USE OF INSULIN: Primary | ICD-10-CM

## 2022-10-24 DIAGNOSIS — E11.65 TYPE 2 DIABETES MELLITUS WITH HYPERGLYCEMIA, WITH LONG-TERM CURRENT USE OF INSULIN: Primary | ICD-10-CM

## 2022-10-24 DIAGNOSIS — F41.9 ANXIETY DISORDER, UNSPECIFIED TYPE: ICD-10-CM

## 2022-10-24 RX ORDER — PEN NEEDLE, DIABETIC 30 GX3/16"
1 NEEDLE, DISPOSABLE MISCELLANEOUS 4 TIMES DAILY
Qty: 400 EACH | Refills: 3 | Status: SHIPPED | OUTPATIENT
Start: 2022-10-24 | End: 2023-09-29

## 2022-10-24 RX ORDER — TRIAMCINOLONE ACETONIDE 1 MG/G
CREAM TOPICAL
Qty: 454 G | Refills: 0 | OUTPATIENT
Start: 2022-10-24

## 2022-10-24 NOTE — TELEPHONE ENCOUNTER
Pt has not been seen by dr paul since 6/2021    Last office visit was with Dr. Peters, please advise

## 2022-10-24 NOTE — TELEPHONE ENCOUNTER
"----- Message from Phuong Dean sent at 10/24/2022  1:47 PM CDT -----  Regarding: refill  Contact: [phone]  Caller, Esha is requesting a refill on Pt (mother) Rx pen needle, diabetic (BD ULTRA-FINE HIEN PEN NEEDLE) 32 gauge x 5/32" Ndle. Please call to discuss further.        [id number] - [place], [place] - [address] AT [address] [place] [address]  Phone: [phone] Fax: [phone]        "

## 2022-10-25 RX ORDER — SERTRALINE HYDROCHLORIDE 100 MG/1
100 TABLET, FILM COATED ORAL DAILY
Qty: 90 TABLET | Refills: 3 | Status: SHIPPED | OUTPATIENT
Start: 2022-10-25 | End: 2023-08-09 | Stop reason: SDUPTHER

## 2022-10-25 NOTE — TELEPHONE ENCOUNTER
No new care gaps identified.  [facility] Embedded Care Gaps. Reference number: [id number]. 10/25/2022   8:12:32 AM CDT

## 2022-10-25 NOTE — TELEPHONE ENCOUNTER
Refill Authorization Note   Indira Waters  is requesting a refill authorization.  Brief Assessment and Rationale for Refill:  Approve     Medication Therapy Plan:       Medication Reconciliation Completed: No   Comments:     No Care Gaps recommended.     Note composed:8:11 AM 10/25/2022

## 2022-10-31 ENCOUNTER — SPECIALTY PHARMACY (OUTPATIENT)
Dept: PHARMACY | Facility: CLINIC | Age: 77
End: 2022-10-31
Payer: MEDICARE

## 2022-10-31 NOTE — TELEPHONE ENCOUNTER
Specialty Pharmacy - Refill Coordination    Specialty Medication Orders Linked to Encounter      Flowsheet Row Most Recent Value   Medication #1 evolocumab (REPATHA SURECLICK) 140 mg/mL PnIj (Order#[id number], Rx#[id number])            Refill Questions - Documented Responses      Flowsheet Row Most Recent Value   Patient Availability and HIPAA Verification    Does patient want to proceed with activity? Yes   HIPAA/medical authority confirmed? Yes   Relationship to patient of person spoken to? Self   Refill Screening Questions    Would patient like to speak to a pharmacist? No   When does the patient need to receive the medication? 11/09/22   Refill Delivery Questions    How will the patient receive the medication? MEDRx   When does the patient need to receive the medication? 11/09/22   Address in [facility] confirmed and updated if neccessary? Yes   Expected Copay ($) 0   Is the patient able to afford the medication copay? Yes   Payment Method zero copay   Days supply of Refill 28   Supplies needed? No supplies needed   Refill activity completed? Yes   Refill activity plan Refill scheduled   Shipment/Pickup Date: 11/07/22            Current Outpatient Medications   Medication Sig    acetaminophen (TYLENOL) 500 MG tablet Take 2 tablets (1,000 mg total) by mouth every 8 (eight) hours as needed for Pain.    allopurinoL (ZYLOPRIM) 300 MG tablet Take 1 tablet (300 mg total) by mouth once daily.    aspirin (ECOTRIN) 81 MG EC tablet Take 1 tablet (81 mg total) by mouth once daily.    blood sugar diagnostic Strp 1 each by Misc.(Non-Drug; Combo Route) route 4 (four) times daily.    blood-glucose meter Misc Follow package directions (Patient taking differently: Follow package directions)    blood-glucose meter,continuous (DEXCOM G6 ) Misc 1 each by Misc.(Non-Drug; Combo Route) route continuous prn.    blood-glucose sensor (DEXCOM G6 SENSOR) Nicole 3 each by Misc.(Non-Drug; Combo Route) route continuous prn.  Change every 10 days.    blood-glucose transmitter (DEXCOM G6 TRANSMITTER) Nicole 1 each by Misc.(Non-Drug; Combo Route) route continuous prn.    cloNIDine (CATAPRES) 0.1 MG tablet Take 1 tablet (0.1 mg total) by mouth 2 (two) times daily.    clopidogreL (PLAVIX) 75 mg tablet Take 1 tablet (75 mg total) by mouth once daily.    evolocumab (REPATHA SURECLICK) 140 mg/mL PnIj Inject 1 mL (140 mg total) into the skin every 14 (fourteen) days.    fenofibrate micronized (LOFIBRA) 134 MG Cap Take 1 capsule (134 mg total) by mouth daily with breakfast.    furosemide (LASIX) 40 MG tablet Take 1 tablet (40 mg total) by mouth once daily.    HYDROcodone-acetaminophen (NORCO)  mg per tablet Take 1 tablet by mouth every 8 (eight) hours as needed for Pain.    insulin aspart U-100 (NOVOLOG) 100 unit/mL (3 mL) InPn pen Inject 12 Units into the skin 3 (three) times daily with meals. Plus correction scale. Max TDD 40units.    insulin detemir U-100 (LEVEMIR FLEXTOUCH U-100 INSULN) 100 unit/mL (3 mL) InPn pen Inject 20 Units into the skin 2 (two) times daily.    ipratropium-albuteroL (COMBIVENT)  mcg/actuation inhaler Inhale 1 puff into the lungs by mouth every 6 (six) hours.    losartan (COZAAR) 50 MG tablet Take 1 tablet (50 mg total) by mouth once daily.    methocarbamoL (ROBAXIN) 500 MG Tab Take 1 tablet (500 mg total) by mouth 3 (three) times daily as needed (muscle spasm).    metoclopramide HCl (REGLAN) 5 MG tablet Take 1 tablet (5 mg total) by mouth 4 (four) times daily as needed (nausea, prevention).    metoprolol succinate (TOPROL-XL) 100 MG 24 hr tablet Take 1 tablet (100 mg total) by mouth once daily.    NIFEdipine (PROCARDIA-XL) 30 MG (OSM) 24 hr tablet Take 1 tablet (30 mg total) by mouth 2 (two) times a day.    nitroGLYCERIN (NITROSTAT) 0.4 MG SL tablet Place 1 tablet (0.4 mg total) under the tongue every 5 (five) minutes as needed for Chest pain.    pantoprazole (PROTONIX) 40 MG tablet Take 1 tablet (40 mg total)  "by mouth once daily.    pen needle, diabetic (BD ULTRA-FINE HIEN PEN NEEDLE) 32 gauge x 5/32" Ndle 1 each by Misc.(Non-Drug; Combo Route) route 4 (four) times daily.    pregabalin (LYRICA) 150 MG capsule Take 1 capsule (150 mg total) by mouth 3 (three) times daily.    senna-docusate 8.6-50 mg (PERICOLACE) 8.6-50 mg per tablet Take 1 tablet by mouth 2 (two) times daily as needed for Constipation.    sertraline (ZOLOFT) 100 MG tablet Take 1 tablet (100 mg total) by mouth once daily.    teriparatide 20 mcg/dose (620mcg/2.48mL) PnIj Inject 20 mcg into the skin once daily.    triamcinolone acetonide 0.1% (KENALOG) 0.1 % cream Apply to affected areas of body twice daily as needed rash. Do not use on face, underarms, or groin.    TRUEPLUS LANCETS 30 gauge Misc TEST AS DIRECTED FOUR TIMES DAILY   Last reviewed on 10/12/2022  2:11 PM by Colin Gibbs NP    Review of patient's allergies indicates:   Allergen Reactions    Bleach (sodium hypochlorite) Shortness Of Breath    Nitrofurantoin macrocrystalline Anaphylaxis    Lipitor [atorvastatin] Diarrhea and Rash    Nsaids (non-steroidal anti-inflammatory drug)     Pcn [penicillins]     Toradol [ketorolac]     Last reviewed on  10/12/2022 2:11 PM by Colin Gibbs      Tasks added this encounter   11/30/2022 - Refill Call (Auto Added)   Tasks due within next 3 months   No tasks due.     Tiffany Martínez, PharmD  [facility] - Specialty Pharmacy  [address] [facility] [address] [facility] [address]  Phone: [phone]  Fax: [phone]        "

## 2022-11-01 ENCOUNTER — OFFICE VISIT (OUTPATIENT)
Dept: INTERNAL MEDICINE | Facility: CLINIC | Age: 77
End: 2022-11-01
Payer: MEDICARE

## 2022-11-01 VITALS — SYSTOLIC BLOOD PRESSURE: 104 MMHG | DIASTOLIC BLOOD PRESSURE: 52 MMHG

## 2022-11-01 DIAGNOSIS — N18.32 STAGE 3B CHRONIC KIDNEY DISEASE: ICD-10-CM

## 2022-11-01 DIAGNOSIS — D64.9 ANEMIA, UNSPECIFIED TYPE: ICD-10-CM

## 2022-11-01 DIAGNOSIS — Z80.0 FAMILY HISTORY OF COLON CANCER: ICD-10-CM

## 2022-11-01 DIAGNOSIS — I25.118 CORONARY ARTERY DISEASE OF NATIVE ARTERY OF NATIVE HEART WITH STABLE ANGINA PECTORIS: ICD-10-CM

## 2022-11-01 DIAGNOSIS — Z91.148 NONCOMPLIANCE WITH MEDICATION REGIMEN: Primary | ICD-10-CM

## 2022-11-01 DIAGNOSIS — R10.2 PELVIC PAIN: ICD-10-CM

## 2022-11-01 DIAGNOSIS — Z79.4 TYPE 2 DIABETES MELLITUS WITH HYPERGLYCEMIA, WITH LONG-TERM CURRENT USE OF INSULIN: Chronic | ICD-10-CM

## 2022-11-01 DIAGNOSIS — E11.65 TYPE 2 DIABETES MELLITUS WITH HYPERGLYCEMIA, WITH LONG-TERM CURRENT USE OF INSULIN: Chronic | ICD-10-CM

## 2022-11-01 DIAGNOSIS — N18.4 STAGE 4 CHRONIC KIDNEY DISEASE: ICD-10-CM

## 2022-11-01 DIAGNOSIS — I10 ESSENTIAL HYPERTENSION: Chronic | ICD-10-CM

## 2022-11-01 PROCEDURE — 99215 OFFICE O/P EST HI 40 MIN: CPT | Mod: GT,,, | Performed by: STUDENT IN AN ORGANIZED HEALTH CARE EDUCATION/TRAINING PROGRAM

## 2022-11-01 PROCEDURE — 99215 PR OFFICE/OUTPT VISIT, EST, LEVL V, 40-54 MIN: ICD-10-PCS | Mod: GT,,, | Performed by: STUDENT IN AN ORGANIZED HEALTH CARE EDUCATION/TRAINING PROGRAM

## 2022-11-01 NOTE — Clinical Note
Please inquire if pt is interested in seeing urogynocology for recommendations pelvic pain.  Urology had recommended this has next step is symptoms persist  Perhaps Dr Cassie Salgado (new) at [facility] please?  Thanks  Also see if pt now willing to see nephrology

## 2022-11-02 ENCOUNTER — TELEPHONE (OUTPATIENT)
Dept: INTERNAL MEDICINE | Facility: CLINIC | Age: 77
End: 2022-11-02
Payer: MEDICARE

## 2022-11-02 NOTE — TELEPHONE ENCOUNTER
----- Message from Chun Zapata MD sent at 11/1/2022  7:44 PM CDT -----  Please inquire if pt is interested in seeing urogynocology for recommendations pelvic pain.  Urology had recommended this has next step is symptoms persist    Perhaps Dr Cassie Salgado (new) at [facility] please?  Thanks    Also see if pt now willing to see nephrology

## 2022-11-03 ENCOUNTER — SPECIALTY PHARMACY (OUTPATIENT)
Dept: PHARMACY | Facility: CLINIC | Age: 77
End: 2022-11-03
Payer: MEDICARE

## 2022-11-03 NOTE — TELEPHONE ENCOUNTER
Specialty Pharmacy - Refill Coordination    Specialty Medication Orders Linked to Encounter      Flowsheet Row Most Recent Value   Medication #1 teriparatide 20 mcg/dose (620mcg/2.48mL) PnIj (Order#[id number], Rx#[id number])            Refill Questions - Documented Responses      Flowsheet Row Most Recent Value   Patient Availability and HIPAA Verification    Does patient want to proceed with activity? Yes   HIPAA/medical authority confirmed? Yes   Relationship to patient of person spoken to? Child   Refill Screening Questions    Changes to allergies? No   Changes to medications? No   New conditions since last clinic visit? No   Unplanned office visit, urgent care, ED, or hospital admission in the last 4 weeks? No   How does patient/caregiver feel medication is working? Good   Financial problems or insurance changes? No   How many doses of your specialty medications were missed in the last 4 weeks? 0   Would patient like to speak to a pharmacist? No   When does the patient need to receive the medication? 11/09/22   Refill Delivery Questions    How will the patient receive the medication? MEDRx   When does the patient need to receive the medication? 11/09/22   Shipping Address Home   Address in [facility] confirmed and updated if neccessary? Yes   Expected Copay ($) 0   Is the patient able to afford the medication copay? Yes   Payment Method zero copay   Days supply of Refill 28   Supplies needed? No supplies needed   Refill activity completed? Yes   Refill activity plan Refill scheduled   Shipment/Pickup Date: 11/07/22            Current Outpatient Medications   Medication Sig    acetaminophen (TYLENOL) 500 MG tablet Take 2 tablets (1,000 mg total) by mouth every 8 (eight) hours as needed for Pain.    allopurinoL (ZYLOPRIM) 300 MG tablet Take 1 tablet (300 mg total) by mouth once daily.    aspirin (ECOTRIN) 81 MG EC tablet Take 1 tablet (81 mg total) by mouth once daily.    blood sugar diagnostic Strp 1 each  by Misc.(Non-Drug; Combo Route) route 4 (four) times daily.    blood-glucose meter Misc Follow package directions (Patient taking differently: Follow package directions)    blood-glucose meter,continuous (DEXCOM G6 ) Misc 1 each by Misc.(Non-Drug; Combo Route) route continuous prn.    blood-glucose sensor (DEXCOM G6 SENSOR) Nicole 3 each by Misc.(Non-Drug; Combo Route) route continuous prn. Change every 10 days.    blood-glucose transmitter (DEXCOM G6 TRANSMITTER) Nicole 1 each by Misc.(Non-Drug; Combo Route) route continuous prn.    cloNIDine (CATAPRES) 0.1 MG tablet Take 1 tablet (0.1 mg total) by mouth 2 (two) times daily.    clopidogreL (PLAVIX) 75 mg tablet Take 1 tablet (75 mg total) by mouth once daily.    evolocumab (REPATHA SURECLICK) 140 mg/mL PnIj Inject 1 mL (140 mg total) into the skin every 14 (fourteen) days.    fenofibrate micronized (LOFIBRA) 134 MG Cap Take 1 capsule (134 mg total) by mouth daily with breakfast.    furosemide (LASIX) 40 MG tablet Take 1 tablet (40 mg total) by mouth once daily.    HYDROcodone-acetaminophen (NORCO)  mg per tablet Take 1 tablet by mouth every 8 (eight) hours as needed for Pain.    insulin aspart U-100 (NOVOLOG) 100 unit/mL (3 mL) InPn pen Inject 12 Units into the skin 3 (three) times daily with meals. Plus correction scale. Max TDD 40units.    insulin detemir U-100 (LEVEMIR FLEXTOUCH U-100 INSULN) 100 unit/mL (3 mL) InPn pen Inject 20 Units into the skin 2 (two) times daily.    ipratropium-albuteroL (COMBIVENT)  mcg/actuation inhaler Inhale 1 puff into the lungs by mouth every 6 (six) hours.    losartan (COZAAR) 50 MG tablet Take 1 tablet (50 mg total) by mouth once daily.    methocarbamoL (ROBAXIN) 500 MG Tab Take 1 tablet (500 mg total) by mouth 3 (three) times daily as needed (muscle spasm).    metoclopramide HCl (REGLAN) 5 MG tablet Take 1 tablet (5 mg total) by mouth 4 (four) times daily as needed (nausea, prevention).    metoprolol succinate  "(TOPROL-XL) 100 MG 24 hr tablet Take 1 tablet (100 mg total) by mouth once daily.    NIFEdipine (PROCARDIA-XL) 30 MG (OSM) 24 hr tablet Take 1 tablet (30 mg total) by mouth 2 (two) times a day.    nitroGLYCERIN (NITROSTAT) 0.4 MG SL tablet Place 1 tablet (0.4 mg total) under the tongue every 5 (five) minutes as needed for Chest pain.    pantoprazole (PROTONIX) 40 MG tablet Take 1 tablet (40 mg total) by mouth once daily.    pen needle, diabetic (BD ULTRA-FINE HIEN PEN NEEDLE) 32 gauge x 5/32" Ndle 1 each by Misc.(Non-Drug; Combo Route) route 4 (four) times daily.    pregabalin (LYRICA) 150 MG capsule Take 1 capsule (150 mg total) by mouth 3 (three) times daily.    senna-docusate 8.6-50 mg (PERICOLACE) 8.6-50 mg per tablet Take 1 tablet by mouth 2 (two) times daily as needed for Constipation.    sertraline (ZOLOFT) 100 MG tablet Take 1 tablet (100 mg total) by mouth once daily.    teriparatide 20 mcg/dose (620mcg/2.48mL) PnIj Inject 20 mcg into the skin once daily.    triamcinolone acetonide 0.1% (KENALOG) 0.1 % cream Apply to affected areas of body twice daily as needed rash. Do not use on face, underarms, or groin.    TRUEPLUS LANCETS 30 gauge Misc TEST AS DIRECTED FOUR TIMES DAILY   Last reviewed on 10/12/2022  2:11 PM by Colin Gibbs NP    Review of patient's allergies indicates:   Allergen Reactions    Bleach (sodium hypochlorite) Shortness Of Breath    Nitrofurantoin macrocrystalline Anaphylaxis    Lipitor [atorvastatin] Diarrhea and Rash    Nsaids (non-steroidal anti-inflammatory drug)     Pcn [penicillins]     Toradol [ketorolac]     Last reviewed on  11/1/2022 7:44 PM by Chun Zapata      Tasks added this encounter   11/30/2022 - Refill Call (Auto Added)   Tasks due within next 3 months   11/30/2022 - Refill Call (Auto Added)     Sowmya Almaraz [facility] - Specialty Pharmacy  [address] [address]  Phone: [phone]  Fax: [phone] "

## 2022-11-08 DIAGNOSIS — K31.84 GASTROPARESIS: ICD-10-CM

## 2022-11-08 DIAGNOSIS — E08.42 DIABETIC POLYNEUROPATHY ASSOCIATED WITH DIABETES MELLITUS DUE TO UNDERLYING CONDITION: ICD-10-CM

## 2022-11-08 DIAGNOSIS — G89.4 CHRONIC PAIN SYNDROME: ICD-10-CM

## 2022-11-08 DIAGNOSIS — G89.4 CHRONIC PAIN DISORDER: ICD-10-CM

## 2022-11-08 RX ORDER — METOCLOPRAMIDE 5 MG/1
5 TABLET ORAL 4 TIMES DAILY PRN
Qty: 30 TABLET | Refills: 3 | Status: SHIPPED | OUTPATIENT
Start: 2022-11-08 | End: 2023-02-01 | Stop reason: SDUPTHER

## 2022-11-08 RX ORDER — HYDROCODONE BITARTRATE AND ACETAMINOPHEN 10; 325 MG/1; MG/1
1 TABLET ORAL EVERY 8 HOURS PRN
Qty: 90 TABLET | Refills: 0 | Status: SHIPPED | OUTPATIENT
Start: 2022-11-12 | End: 2022-12-12 | Stop reason: SDUPTHER

## 2022-11-08 NOTE — TELEPHONE ENCOUNTER
Refill Routing Note   Medication(s) are not appropriate for processing by [facility] for the following reason(s):      - Outside of protocol    [facility] action(s):  Route          Medication reconciliation completed: No     Appointments  past 12m or future 3m with PCP    Date Provider   Last Visit   11/1/2022 Chun Zapata MD   Next Visit   Visit date not found Chun Zapata MD   ED visits in past 90 days: 1        Note composed:2:37 PM 11/08/2022

## 2022-11-08 NOTE — TELEPHONE ENCOUNTER
Patient requesting refill on Hydrocodone-acetaminophen (NORCO) 10 mg  Last office visit 10/12/2022   shows last refill on 10/12/2022  Patient does have a pain contract on file with [facility]  Patient last UDS 02/14/2022 was consistent with current therapy       HYDROCODONE  Present     NORHYDROCODONE  Present     HYDROMORPHONE  Present

## 2022-11-08 NOTE — TELEPHONE ENCOUNTER
No new care gaps identified.  [facility] Embedded Care Gaps. Reference number: [id number]. 11/08/2022   2:38:22 PM CST

## 2022-11-21 ENCOUNTER — OFFICE VISIT (OUTPATIENT)
Dept: OTOLARYNGOLOGY | Facility: CLINIC | Age: 77
End: 2022-11-21
Payer: MEDICARE

## 2022-11-21 VITALS — HEART RATE: 74 BPM | DIASTOLIC BLOOD PRESSURE: 68 MMHG | SYSTOLIC BLOOD PRESSURE: 133 MMHG

## 2022-11-21 DIAGNOSIS — R13.11 DYSPHAGIA, ORAL PHASE: ICD-10-CM

## 2022-11-21 DIAGNOSIS — R04.2 HEMOPTYSIS: ICD-10-CM

## 2022-11-21 DIAGNOSIS — R13.10 DYSPHAGIA, UNSPECIFIED TYPE: Primary | ICD-10-CM

## 2022-11-21 PROCEDURE — 31575 PR LARYNGOSCOPY, FLEXIBLE; DIAGNOSTIC: ICD-10-PCS | Mod: S$PBB,,, | Performed by: OTOLARYNGOLOGY

## 2022-11-21 PROCEDURE — 99203 OFFICE O/P NEW LOW 30 MIN: CPT | Mod: 25,S$PBB,, | Performed by: OTOLARYNGOLOGY

## 2022-11-21 PROCEDURE — 31575 DIAGNOSTIC LARYNGOSCOPY: CPT | Mod: PBBFAC | Performed by: OTOLARYNGOLOGY

## 2022-11-21 PROCEDURE — 31575 DIAGNOSTIC LARYNGOSCOPY: CPT | Mod: S$PBB,,, | Performed by: OTOLARYNGOLOGY

## 2022-11-21 PROCEDURE — 99999 PR PBB SHADOW E&M-EST. PATIENT-LVL V: CPT | Mod: PBBFAC,,, | Performed by: OTOLARYNGOLOGY

## 2022-11-21 PROCEDURE — 99203 PR OFFICE/OUTPT VISIT, NEW, LEVL III, 30-44 MIN: ICD-10-PCS | Mod: 25,S$PBB,, | Performed by: OTOLARYNGOLOGY

## 2022-11-21 PROCEDURE — 99215 OFFICE O/P EST HI 40 MIN: CPT | Mod: PBBFAC,25 | Performed by: OTOLARYNGOLOGY

## 2022-11-21 PROCEDURE — 99999 PR PBB SHADOW E&M-EST. PATIENT-LVL V: ICD-10-PCS | Mod: PBBFAC,,, | Performed by: OTOLARYNGOLOGY

## 2022-11-22 PROBLEM — R13.10 DYSPHAGIA: Status: ACTIVE | Noted: 2022-11-22

## 2022-11-22 PROBLEM — R04.2 HEMOPTYSIS: Status: ACTIVE | Noted: 2022-11-22

## 2022-11-22 NOTE — PROGRESS NOTES
Chief Complaint   Patient presents with    consult/ problems swallowing, change in speech, throat irri       HPI   77 y.o. female presents for evaluation of recent hemoptysis.  She reports she class coughed up blood roughly 6 weeks ago.  She is anticoagulated with aspirin and Plavix.  She also reports frequent drooling and dysphagia to both solids and liquids.  She is status post stroke.    Review of Systems   Constitutional: Negative for fatigue and unexpected weight change.   HENT: Per HPI.  Eyes: Negative for visual disturbance.   Respiratory: Negative for shortness of breath, hemoptysis   Cardiovascular: Negative for chest pain and palpitations.   Musculoskeletal: Negative for decreased ROM, back pain.   Skin: Negative for rash, sunburn, itching.   Neurological: Negative for dizziness and seizures.   Hematological: Negative for adenopathy. Does not bruise/bleed easily.   Endocrine: Negative for rapid weight loss/weight gain, heat/cold intolerance.     Past Medical History   Patient Active Problem List   Diagnosis    Abnormal electrocardiogram    Abnormal presence of protein in urine    Gastroesophageal reflux disease without esophagitis    Anxiety disorder    CAFL (chronic airflow limitation)    Calculus of kidney    Chronic pain syndrome    Clinical depression    Type 2 diabetes mellitus with hyperglycemia    Fibromyalgia    Familial hypercholesterolemia    Arthritis or polyarthritis, rheumatoid    Lung mass    Age-related osteoporosis without current pathological fracture    Diabetic polyneuropathy    History of renal calculi    History of cervical cancer    Mucopurulent chronic bronchitis    Radiculopathy of thoracolumbar region    Neuropathic pain    Former smoker    Chest pain    Coronary artery calcification    Chronic pain    Essential hypertension    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 pectoris    Noncompliance    Recurrent major depressive disorder, in remission     Noncompliance with medication regimen    Adverse effect of bisphosphonate    Class 2 severe obesity with serious comorbidity and body mass index (BMI) of 36.0 to 36.9 in adult    Hemoptysis    Dysphagia           Past Surgical History   Past Surgical History:   Procedure Laterality Date    APPENDECTOMY       SECTION      CORONARY ANGIOGRAPHY N/A 2022    Procedure: ANGIOGRAM, CORONARY ARTERY;  Surgeon: Jose L Méndez MD;  Location: [facility] CATH LAB;  Service: Cardiology;  Laterality: N/A;    HYSTERECTOMY      INJECTION OF ANESTHETIC AGENT AROUND NERVE Bilateral 2021    Procedure: BLOCK, NERVE, SYMPATHIC;  Surgeon: Holden Pereira MD;  Location: [facility] PAIN MGT;  Service: Pain Management;  Laterality: Bilateral;    INJECTION OF ANESTHETIC AGENT AROUND NERVE N/A 2021    Procedure: BLOCK, NERVE, SYMPATHETIC  need consent;  Surgeon: Holden Pereira MD;  Location: [facility] PAIN MGT;  Service: Pain Management;  Laterality: N/A;    DE EVAL,SWALLOW FUNCTION,CINE/VIDEO RECORD  2021         TONSILLECTOMY           Family History   Family History   Problem Relation Age of Onset    COPD Mother     Lupus Mother     Hernia Mother     Uterine cancer Mother         vs cervical cancer    Ovarian cancer Mother     Diabetes Father     Coronary artery disease Father     Colon cancer Maternal Grandmother         in her 50's           Social History   .  Social History     Socioeconomic History    Marital status:    Tobacco Use    Smoking status: Former     Types: Cigarettes     Quit date: 2020     Years since quittin.0    Smokeless tobacco: Never   Substance and Sexual Activity    Alcohol use: No    Drug use: No    Sexual activity: Not Currently     Social Determinants of Health     Financial Resource Strain: Low Risk     Difficulty of Paying Living Expenses: Not hard at all   Food Insecurity: No Food Insecurity    Worried About Running Out of Food in the Last Year: Never true    Ran Out of Food in  the Last Year: Never true   Transportation Needs: No Transportation Needs    Lack of Transportation (Medical): No    Lack of Transportation (Non-Medical): No   Physical Activity: Insufficiently Active    Days of Exercise per Week: 3 days    Minutes of Exercise per Session: 20 min   Stress: No Stress Concern Present    Feeling of Stress : Not at all   Social Connections: Socially Isolated    Frequency of Communication with Friends and Family: More than three times a week    Frequency of Social Gatherings with Friends and Family: Never    Attends Temple Services: Never    Active Member of Clubs or Organizations: No    Attends Club or Organization Meetings: Never    Marital Status:    Housing Stability: Low Risk     Unable to Pay for Housing in the Last Year: No    Number of Places Lived in the Last Year: 1    Unstable Housing in the Last Year: No         Allergies   Review of patient's allergies indicates:   Allergen Reactions    Bleach (sodium hypochlorite) Shortness Of Breath    Nitrofurantoin macrocrystalline Anaphylaxis    Lipitor [atorvastatin] Diarrhea and Rash    Nsaids (non-steroidal anti-inflammatory drug)     Pcn [penicillins]     Toradol [ketorolac]            Physical Exam     Vitals:    11/21/22 1407   BP: 133/68   Pulse: 74         There is no height or weight on file to calculate BMI.      General: AOx3, NAD   Respiratory:  Symmetric chest rise, normal effort  Nose: No gross nasal septal deviation. Inferior Turbinates WNL bilaterally. No septal perforation. No masses/lesions.   Oral Cavity:  Oral Tongue mobile, no lesions noted. Hard Palate WNL. No buccal or FOM lesions.  Oropharynx:  No masses/lesions of the posterior pharyngeal wall. Tonsillar fossa without lesions. Soft palate without masses. Midline uvula.   Neck: No scars.  No cervical lymphadenopathy, thyromegaly or thyroid nodules.  Normal range of motion.    Face: House Brackmann I bilaterally.     Flex Naso Lizett Hypo Procedures  #2    Procedure:  Diagnostic flexible nasopharyngoscopy, laryngoscopy and hypopharyngoscopy:    Routine preparation with local atomizer with 1% neosynephrine/pontocaine with customary flexible endoscope.    Nasopharynx:  No lesions.   Mucosa:  No lesions.   Adenoids:  Present.  Posterior Choanae:  Patent.  Eustachian Tubes:  Patent.  Posterior pharynx:  No lesions.  Larynx/hypopharynx:   Epiglottis:  No lesions, without edema.   AE Folds:  No lesions.   Vocal cords:  No polyps, nodules, ulcers or lesions.   Mobility:  Equal and normal bilateral.   Hypopharynx:  No lesions.   Piriform sinus:  No pooling, no lesions.   Post Cricoid:  No erythema, no edema.      Assessment/Plan  Problem List Items Addressed This Visit          Pulmonary    Hemoptysis     No worrisome lesions.  I suspect that her hemoptysis was due to irritation of the pharynx in the setting of anticoagulation.  Reassured.         Relevant Orders    Fl Modified Barium Swallow Speech    SLP video swallow       GI    Dysphagia - Primary     Repeat modified barium swallow.         Relevant Orders    Fl Modified Barium Swallow Speech    SLP video swallow     Other Visit Diagnoses       Dysphagia, oral phase        Relevant Orders    Fl Modified Barium Swallow Speech

## 2022-11-22 NOTE — ASSESSMENT & PLAN NOTE
No worrisome lesions.  I suspect that her hemoptysis was due to irritation of the pharynx in the setting of anticoagulation.  Reassured.

## 2022-11-29 DIAGNOSIS — N18.1 CKD (CHRONIC KIDNEY DISEASE) STAGE 1, GFR 90 ML/MIN OR GREATER: Primary | ICD-10-CM

## 2022-11-29 DIAGNOSIS — E11.65 TYPE 2 DIABETES MELLITUS WITH HYPERGLYCEMIA, UNSPECIFIED WHETHER LONG TERM INSULIN USE: ICD-10-CM

## 2022-11-30 ENCOUNTER — PATIENT MESSAGE (OUTPATIENT)
Dept: PHARMACY | Facility: CLINIC | Age: 77
End: 2022-11-30
Payer: MEDICARE

## 2022-11-30 ENCOUNTER — SPECIALTY PHARMACY (OUTPATIENT)
Dept: PHARMACY | Facility: CLINIC | Age: 77
End: 2022-11-30
Payer: MEDICARE

## 2022-11-30 DIAGNOSIS — I10 ESSENTIAL HYPERTENSION: ICD-10-CM

## 2022-11-30 RX ORDER — FUROSEMIDE 40 MG/1
40 TABLET ORAL DAILY
Qty: 30 TABLET | Refills: 11 | Status: CANCELLED | OUTPATIENT
Start: 2022-11-30 | End: 2023-11-30

## 2022-11-30 RX ORDER — METOPROLOL SUCCINATE 100 MG/1
100 TABLET, EXTENDED RELEASE ORAL DAILY
Qty: 90 TABLET | Refills: 3 | Status: SHIPPED | OUTPATIENT
Start: 2022-11-30 | End: 2023-08-09 | Stop reason: SDUPTHER

## 2022-11-30 NOTE — TELEPHONE ENCOUNTER
Refill Routing Note   Medication(s) are not appropriate for processing by [facility] for the following reason(s):      - Medication requested has undergone a recent dosage adjustment (<3 months)    [facility] action(s):  Defer          Medication reconciliation completed: No     Appointments  past 12m or future 3m with PCP    Date Provider   Last Visit   11/1/2022 Chun Zapata MD   Next Visit   Visit date not found Chun Zapata MD   ED visits in past 90 days: 1        Note composed:4:15 PM 11/30/2022

## 2022-11-30 NOTE — TELEPHONE ENCOUNTER
No new care gaps identified.  [facility] Embedded Care Gaps. Reference number: [id number]. 11/30/2022   2:24:13 PM CST

## 2022-11-30 NOTE — TELEPHONE ENCOUNTER
Specialty Pharmacy - Refill Coordination    Specialty Medication Orders Linked to Encounter      Flowsheet Row Most Recent Value   Medication #1 teriparatide 20 mcg/dose (620mcg/2.48mL) PnIj (Order#[id number], Rx#[id number])            Refill Questions - Documented Responses      Flowsheet Row Most Recent Value   Patient Availability and HIPAA Verification    Does patient want to proceed with activity? Yes   HIPAA/medical authority confirmed? Yes   Relationship to patient of person spoken to? Child   Refill Screening Questions    Changes to allergies? No   Changes to medications? No   New conditions since last clinic visit? No   Unplanned office visit, urgent care, ED, or hospital admission in the last 4 weeks? No   How does patient/caregiver feel medication is working? Good   Financial problems or insurance changes? No   How many doses of your specialty medications were missed in the last 4 weeks? 0   Would patient like to speak to a pharmacist? No   When does the patient need to receive the medication? 12/07/22   Refill Delivery Questions    How will the patient receive the medication? [facility]   When does the patient need to receive the medication? 12/07/22   Shipping Address Home   Address in [facility] confirmed and updated if neccessary? Yes   Expected Copay ($) 0   Is the patient able to afford the medication copay? Yes   Payment Method zero copay   Days supply of Refill 28   Supplies needed? No supplies needed   Refill activity completed? Yes   Refill activity plan Refill scheduled   Shipment/Pickup Date: 12/05/22            Current Outpatient Medications   Medication Sig    acetaminophen (TYLENOL) 500 MG tablet Take 2 tablets (1,000 mg total) by mouth every 8 (eight) hours as needed for Pain.    allopurinoL (ZYLOPRIM) 300 MG tablet Take 1 tablet (300 mg total) by mouth once daily.    aspirin (ECOTRIN) 81 MG EC tablet Take 1 tablet (81 mg total) by mouth once daily.    blood sugar diagnostic Strp 1 each  by Misc.(Non-Drug; Combo Route) route 4 (four) times daily.    blood-glucose meter Misc Follow package directions (Patient taking differently: Follow package directions)    blood-glucose meter,continuous (DEXCOM G6 ) Misc 1 each by Misc.(Non-Drug; Combo Route) route continuous prn.    blood-glucose sensor (DEXCOM G6 SENSOR) Nicole 3 each by Misc.(Non-Drug; Combo Route) route continuous prn. Change every 10 days.    blood-glucose transmitter (DEXCOM G6 TRANSMITTER) Nicole 1 each by Misc.(Non-Drug; Combo Route) route continuous prn.    cloNIDine (CATAPRES) 0.1 MG tablet Take 1 tablet (0.1 mg total) by mouth 2 (two) times daily.    clopidogreL (PLAVIX) 75 mg tablet Take 1 tablet (75 mg total) by mouth once daily.    evolocumab (REPATHA SURECLICK) 140 mg/mL PnIj Inject 1 mL (140 mg total) into the skin every 14 (fourteen) days.    fenofibrate micronized (LOFIBRA) 134 MG Cap Take 1 capsule (134 mg total) by mouth daily with breakfast.    furosemide (LASIX) 40 MG tablet Take 1 tablet (40 mg total) by mouth once daily.    HYDROcodone-acetaminophen (NORCO)  mg per tablet Take 1 tablet by mouth every 8 (eight) hours as needed for Pain.    insulin aspart U-100 (NOVOLOG) 100 unit/mL (3 mL) InPn pen Inject 12 Units into the skin 3 (three) times daily with meals. Plus correction scale. Max TDD 40units.    insulin detemir U-100 (LEVEMIR FLEXTOUCH U-100 INSULN) 100 unit/mL (3 mL) InPn pen Inject 20 Units into the skin 2 (two) times daily.    ipratropium-albuteroL (COMBIVENT)  mcg/actuation inhaler Inhale 1 puff into the lungs by mouth every 6 (six) hours.    losartan (COZAAR) 50 MG tablet Take 1 tablet (50 mg total) by mouth once daily.    methocarbamoL (ROBAXIN) 500 MG Tab Take 1 tablet (500 mg total) by mouth 3 (three) times daily as needed (muscle spasm).    metoclopramide HCl (REGLAN) 5 MG tablet Take 1 tablet (5 mg total) by mouth 4 (four) times daily as needed (nausea, prevention).    metoprolol succinate  "(TOPROL-XL) 100 MG 24 hr tablet Take 1 tablet (100 mg total) by mouth once daily.    NIFEdipine (PROCARDIA-XL) 30 MG (OSM) 24 hr tablet Take 1 tablet (30 mg total) by mouth 2 (two) times a day.    nitroGLYCERIN (NITROSTAT) 0.4 MG SL tablet Place 1 tablet (0.4 mg total) under the tongue every 5 (five) minutes as needed for Chest pain.    pantoprazole (PROTONIX) 40 MG tablet Take 1 tablet (40 mg total) by mouth once daily.    pen needle, diabetic (BD ULTRA-FINE HIEN PEN NEEDLE) 32 gauge x 5/32" Ndle 1 each by Misc.(Non-Drug; Combo Route) route 4 (four) times daily.    pregabalin (LYRICA) 150 MG capsule Take 1 capsule (150 mg total) by mouth 3 (three) times daily.    senna-docusate 8.6-50 mg (PERICOLACE) 8.6-50 mg per tablet Take 1 tablet by mouth 2 (two) times daily as needed for Constipation.    sertraline (ZOLOFT) 100 MG tablet Take 1 tablet (100 mg total) by mouth once daily.    teriparatide 20 mcg/dose (620mcg/2.48mL) PnIj Inject 20 mcg into the skin once daily.    triamcinolone acetonide 0.1% (KENALOG) 0.1 % cream Apply to affected areas of body twice daily as needed rash. Do not use on face, underarms, or groin.    TRUEPLUS LANCETS 30 gauge Misc TEST AS DIRECTED FOUR TIMES DAILY   Last reviewed on 11/22/2022 10:52 AM by Gagan Plaza MD    Review of patient's allergies indicates:   Allergen Reactions    Bleach (sodium hypochlorite) Shortness Of Breath    Nitrofurantoin macrocrystalline Anaphylaxis    Lipitor [atorvastatin] Diarrhea and Rash    Nsaids (non-steroidal anti-inflammatory drug)     Pcn [penicillins]     Toradol [ketorolac]     Last reviewed on  11/22/2022 10:52 AM by Gagan Plaza      Tasks added this encounter   12/28/2022 - Refill Call (Auto Added)   Tasks due within next 3 months   No tasks due.     Tangela Chris, PharmD  [facility]  [address] [address]  Phone: [phone]  Fax: [phone] "

## 2022-12-02 ENCOUNTER — TELEPHONE (OUTPATIENT)
Dept: SPEECH THERAPY | Facility: HOSPITAL | Age: 77
End: 2022-12-02
Payer: MEDICARE

## 2022-12-05 ENCOUNTER — LAB VISIT (OUTPATIENT)
Dept: LAB | Facility: OTHER | Age: 77
End: 2022-12-05
Attending: INTERNAL MEDICINE
Payer: MEDICARE

## 2022-12-05 DIAGNOSIS — N18.1 CKD (CHRONIC KIDNEY DISEASE) STAGE 1, GFR 90 ML/MIN OR GREATER: ICD-10-CM

## 2022-12-05 DIAGNOSIS — E11.65 TYPE 2 DIABETES MELLITUS WITH HYPERGLYCEMIA, UNSPECIFIED WHETHER LONG TERM INSULIN USE: ICD-10-CM

## 2022-12-05 LAB
ALBUMIN SERPL BCP-MCNC: 3.6 G/DL (ref 3.5–5.2)
ALP SERPL-CCNC: 69 U/L (ref 55–135)
ALT SERPL W/O P-5'-P-CCNC: 23 U/L (ref 10–44)
ANION GAP SERPL CALC-SCNC: 10 MMOL/L (ref 8–16)
AST SERPL-CCNC: 23 U/L (ref 10–40)
BASOPHILS # BLD AUTO: 0.05 K/UL (ref 0–0.2)
BASOPHILS NFR BLD: 0.5 % (ref 0–1.9)
BILIRUB SERPL-MCNC: 0.2 MG/DL (ref 0.1–1)
BUN SERPL-MCNC: 21 MG/DL (ref 8–23)
CALCIUM SERPL-MCNC: 9.5 MG/DL (ref 8.7–10.5)
CHLORIDE SERPL-SCNC: 103 MMOL/L (ref 95–110)
CO2 SERPL-SCNC: 24 MMOL/L (ref 23–29)
CREAT SERPL-MCNC: 1.5 MG/DL (ref 0.5–1.4)
DIFFERENTIAL METHOD: ABNORMAL
EOSINOPHIL # BLD AUTO: 0.5 K/UL (ref 0–0.5)
EOSINOPHIL NFR BLD: 5 % (ref 0–8)
ERYTHROCYTE [DISTWIDTH] IN BLOOD BY AUTOMATED COUNT: 15.3 % (ref 11.5–14.5)
EST. GFR  (NO RACE VARIABLE): 36 ML/MIN/1.73 M^2
ESTIMATED AVG GLUCOSE: 246 MG/DL (ref 68–131)
GLUCOSE SERPL-MCNC: 386 MG/DL (ref 70–110)
HBA1C MFR BLD: 10.2 % (ref 4–5.6)
HCT VFR BLD AUTO: 32.3 % (ref 37–48.5)
HGB BLD-MCNC: 10.4 G/DL (ref 12–16)
IMM GRANULOCYTES # BLD AUTO: 0.05 K/UL (ref 0–0.04)
IMM GRANULOCYTES NFR BLD AUTO: 0.5 % (ref 0–0.5)
LYMPHOCYTES # BLD AUTO: 2.1 K/UL (ref 1–4.8)
LYMPHOCYTES NFR BLD: 22.7 % (ref 18–48)
MCH RBC QN AUTO: 28.3 PG (ref 27–31)
MCHC RBC AUTO-ENTMCNC: 32.2 G/DL (ref 32–36)
MCV RBC AUTO: 88 FL (ref 82–98)
MONOCYTES # BLD AUTO: 0.5 K/UL (ref 0.3–1)
MONOCYTES NFR BLD: 5.7 % (ref 4–15)
NEUTROPHILS # BLD AUTO: 6 K/UL (ref 1.8–7.7)
NEUTROPHILS NFR BLD: 65.6 % (ref 38–73)
NRBC BLD-RTO: 0 /100 WBC
PHOSPHATE SERPL-MCNC: 3.3 MG/DL (ref 2.7–4.5)
PLATELET # BLD AUTO: 292 K/UL (ref 150–450)
PMV BLD AUTO: 12.1 FL (ref 9.2–12.9)
POTASSIUM SERPL-SCNC: 4.6 MMOL/L (ref 3.5–5.1)
PROT SERPL-MCNC: 7 G/DL (ref 6–8.4)
PTH-INTACT SERPL-MCNC: 30.1 PG/ML (ref 9–77)
RBC # BLD AUTO: 3.68 M/UL (ref 4–5.4)
SODIUM SERPL-SCNC: 137 MMOL/L (ref 136–145)
URATE SERPL-MCNC: 3.5 MG/DL (ref 2.4–5.7)
WBC # BLD AUTO: 9.2 K/UL (ref 3.9–12.7)

## 2022-12-05 PROCEDURE — 80053 COMPREHEN METABOLIC PANEL: CPT | Performed by: INTERNAL MEDICINE

## 2022-12-05 PROCEDURE — 36415 COLL VENOUS BLD VENIPUNCTURE: CPT | Performed by: INTERNAL MEDICINE

## 2022-12-05 PROCEDURE — 84100 ASSAY OF PHOSPHORUS: CPT | Performed by: INTERNAL MEDICINE

## 2022-12-05 PROCEDURE — 83036 HEMOGLOBIN GLYCOSYLATED A1C: CPT | Performed by: INTERNAL MEDICINE

## 2022-12-05 PROCEDURE — 84550 ASSAY OF BLOOD/URIC ACID: CPT | Performed by: INTERNAL MEDICINE

## 2022-12-05 PROCEDURE — 85025 COMPLETE CBC W/AUTO DIFF WBC: CPT | Performed by: INTERNAL MEDICINE

## 2022-12-05 PROCEDURE — 83970 ASSAY OF PARATHORMONE: CPT | Performed by: INTERNAL MEDICINE

## 2022-12-07 ENCOUNTER — OFFICE VISIT (OUTPATIENT)
Dept: NEPHROLOGY | Facility: CLINIC | Age: 77
End: 2022-12-07
Payer: MEDICARE

## 2022-12-07 VITALS
SYSTOLIC BLOOD PRESSURE: 132 MMHG | DIASTOLIC BLOOD PRESSURE: 70 MMHG | OXYGEN SATURATION: 96 % | BODY MASS INDEX: 36.17 KG/M2 | HEART RATE: 84 BPM | HEIGHT: 61 IN | WEIGHT: 191.56 LBS

## 2022-12-07 DIAGNOSIS — N20.0 CALCULUS OF KIDNEY: Primary | ICD-10-CM

## 2022-12-07 DIAGNOSIS — N18.4 STAGE 4 CHRONIC KIDNEY DISEASE: ICD-10-CM

## 2022-12-07 PROCEDURE — 99205 PR OFFICE/OUTPT VISIT, NEW, LEVL V, 60-74 MIN: ICD-10-PCS | Mod: S$PBB,,, | Performed by: INTERNAL MEDICINE

## 2022-12-07 PROCEDURE — 99205 OFFICE O/P NEW HI 60 MIN: CPT | Mod: S$PBB,,, | Performed by: INTERNAL MEDICINE

## 2022-12-07 PROCEDURE — 99215 OFFICE O/P EST HI 40 MIN: CPT | Mod: PBBFAC | Performed by: INTERNAL MEDICINE

## 2022-12-07 PROCEDURE — 99999 PR PBB SHADOW E&M-EST. PATIENT-LVL V: ICD-10-PCS | Mod: PBBFAC,,, | Performed by: INTERNAL MEDICINE

## 2022-12-07 PROCEDURE — 99999 PR PBB SHADOW E&M-EST. PATIENT-LVL V: CPT | Mod: PBBFAC,,, | Performed by: INTERNAL MEDICINE

## 2022-12-07 NOTE — PROGRESS NOTES
New Consultation Report  Nephrology      Consult Requested By: PCP  Reason for Consult: CKD, nephrolithiasis    History of Present Illness:  Patient is a 77 y.o. female presents for a new consultation for evaluation of elevated serum creatinine and presumed chronic kidney disease. The patient was found to have an elevated serum creatinine during routine laboratory testing, at 1.5 mg/dL.     The patient denies SOB, LE edema, hematuria or foamy urine. She has had nephrolithiasis with multiple (>10 times) stones passed over her entire life beginning during her adolescence. She underwent lithotripsy 3 years ago, last stone passed several months ago. She was told that she has uric acid stones.     The patient denies taking NSAIDs or new antibiotics, recreational drugs, recent episode of dehydration, diarrhea, nausea or vomiting, acute illness, hospitalization or exposure to IV radiocontrast.     Past Medical History:   Diagnosis Date    Arthritis     Back pain     Cancer     ovarian    Depression     Diabetes mellitus     Fibromyalgia     Hyperlipidemia     Hypertension     Lupus     Stroke     slight left sided weakness         Current Outpatient Medications:     acetaminophen (TYLENOL) 500 MG tablet, Take 2 tablets (1,000 mg total) by mouth every 8 (eight) hours as needed for Pain., Disp: , Rfl: 0    allopurinoL (ZYLOPRIM) 300 MG tablet, Take 1 tablet (300 mg total) by mouth once daily., Disp: 90 tablet, Rfl: 1    aspirin (ECOTRIN) 81 MG EC tablet, Take 1 tablet (81 mg total) by mouth once daily., Disp: 30 tablet, Rfl: 0    cloNIDine (CATAPRES) 0.1 MG tablet, Take 1 tablet (0.1 mg total) by mouth 2 (two) times daily., Disp: 180 tablet, Rfl: 0    clopidogreL (PLAVIX) 75 mg tablet, Take 1 tablet (75 mg total) by mouth once daily., Disp: 30 tablet, Rfl: 3    evolocumab (REPATHA SURECLICK) 140 mg/mL PnIj, Inject 1 mL (140 mg total) into the skin every 14 (fourteen) days., Disp: 8 each, Rfl: 3    fenofibrate micronized  (LOFIBRA) 134 MG Cap, Take 1 capsule (134 mg total) by mouth daily with breakfast., Disp: 90 capsule, Rfl: 3    furosemide (LASIX) 40 MG tablet, Take 1 tablet (40 mg total) by mouth once daily., Disp: 30 tablet, Rfl: 11    HYDROcodone-acetaminophen (NORCO)  mg per tablet, Take 1 tablet by mouth every 8 (eight) hours as needed for Pain., Disp: 90 tablet, Rfl: 0    insulin aspart U-100 (NOVOLOG) 100 unit/mL (3 mL) InPn pen, Inject 12 Units into the skin 3 (three) times daily with meals. Plus correction scale. Max TDD 40units., Disp: 15 mL, Rfl: 3    insulin detemir U-100 (LEVEMIR FLEXTOUCH U-100 INSULN) 100 unit/mL (3 mL) InPn pen, Inject 20 Units into the skin 2 (two) times daily., Disp: 15 mL, Rfl: 3    ipratropium-albuteroL (COMBIVENT)  mcg/actuation inhaler, Inhale 1 puff into the lungs by mouth every 6 (six) hours., Disp: 4 g, Rfl: 0    methocarbamoL (ROBAXIN) 500 MG Tab, Take 1 tablet (500 mg total) by mouth 3 (three) times daily as needed (muscle spasm)., Disp: 90 tablet, Rfl: 2    metoclopramide HCl (REGLAN) 5 MG tablet, Take 1 tablet (5 mg total) by mouth 4 (four) times daily as needed (nausea, prevention)., Disp: 30 tablet, Rfl: 3    metoprolol succinate (TOPROL-XL) 100 MG 24 hr tablet, Take 1 tablet (100 mg total) by mouth once daily., Disp: 90 tablet, Rfl: 3    nitroGLYCERIN (NITROSTAT) 0.4 MG SL tablet, Place 1 tablet (0.4 mg total) under the tongue every 5 (five) minutes as needed for Chest pain., Disp: 25 tablet, Rfl: 5    pantoprazole (PROTONIX) 40 MG tablet, Take 1 tablet (40 mg total) by mouth once daily., Disp: 30 tablet, Rfl: 0    pregabalin (LYRICA) 150 MG capsule, Take 1 capsule (150 mg total) by mouth 3 (three) times daily., Disp: 90 capsule, Rfl: 2    senna-docusate 8.6-50 mg (PERICOLACE) 8.6-50 mg per tablet, Take 1 tablet by mouth 2 (two) times daily as needed for Constipation., Disp: 60 tablet, Rfl: 0    sertraline (ZOLOFT) 100 MG tablet, Take 1 tablet (100 mg total) by mouth  "once daily., Disp: 90 tablet, Rfl: 3    teriparatide 20 mcg/dose (620mcg/2.48mL) PnIj, Inject 20 mcg into the skin once daily., Disp: 2.48 mL, Rfl: 11    triamcinolone acetonide 0.1% (KENALOG) 0.1 % cream, Apply to affected areas of body twice daily as needed rash. Do not use on face, underarms, or groin., Disp: 454 g, Rfl: 0    blood sugar diagnostic Strp, 1 each by Misc.(Non-Drug; Combo Route) route 4 (four) times daily., Disp: 200 each, Rfl: 0    blood-glucose meter Misc, Follow package directions (Patient taking differently: Follow package directions), Disp: 1 each, Rfl: 0    blood-glucose meter,continuous (DEXCOM G6 ) Misc, 1 each by Misc.(Non-Drug; Combo Route) route continuous prn., Disp: 1 each, Rfl: PRN    blood-glucose sensor (DEXCOM G6 SENSOR) Nicole, 3 each by Misc.(Non-Drug; Combo Route) route continuous prn. Change every 10 days., Disp: 3 each, Rfl: PRN    blood-glucose transmitter (DEXCOM G6 TRANSMITTER) Nicole, 1 each by Misc.(Non-Drug; Combo Route) route continuous prn., Disp: 1 each, Rfl: PRN    losartan (COZAAR) 50 MG tablet, Take 1 tablet (50 mg total) by mouth once daily., Disp: 90 tablet, Rfl: 0    NIFEdipine (PROCARDIA-XL) 30 MG (OSM) 24 hr tablet, Take 1 tablet (30 mg total) by mouth 2 (two) times a day. (Patient not taking: Reported on 12/7/2022), Disp: 180 tablet, Rfl: 0    pen needle, diabetic (BD ULTRA-FINE HIEN PEN NEEDLE) 32 gauge x 5/32" Ndle, 1 each by Misc.(Non-Drug; Combo Route) route 4 (four) times daily., Disp: 400 each, Rfl: 3    TRUEPLUS LANCETS 30 gauge Misc, TEST AS DIRECTED FOUR TIMES DAILY, Disp: 200 each, Rfl: 2  Review of patient's allergies indicates:   Allergen Reactions    Bleach (sodium hypochlorite) Shortness Of Breath    Nitrofurantoin macrocrystalline Anaphylaxis    Lipitor [atorvastatin] Diarrhea and Rash    Nsaids (non-steroidal anti-inflammatory drug)     Pcn [penicillins]     Toradol [ketorolac]         Past Surgical History:   Procedure Laterality Date    " APPENDECTOMY       SECTION      CORONARY ANGIOGRAPHY N/A 2022    Procedure: ANGIOGRAM, CORONARY ARTERY;  Surgeon: Jose L Méndez MD;  Location: [facility] CATH [facility];  Service: Cardiology;  Laterality: N/A;    HYSTERECTOMY      INJECTION OF ANESTHETIC AGENT AROUND NERVE Bilateral 2021    Procedure: BLOCK, NERVE, SYMPATHIC;  Surgeon: Holden Pereira MD;  Location: [facility]IN [facility]GT;  Service: Pain Management;  Laterality: Bilateral;    INJECTION OF ANESTHETIC AGENT AROUND NERVE N/A 2021    Procedure: BLOCK, NERVE, SYMPATHETIC  need consent;  Surgeon: Holden Pereira MD;  Location: [facility]IN [facility];  Service: Pain Management;  Laterality: N/A;    OH EVAL,SWALLOW FUNCTION,CINE/VIDEO RECORD  2021         TONSILLECTOMY       Family History   Problem Relation Age of Onset    COPD Mother     Lupus Mother     Hernia Mother     Uterine cancer Mother         vs cervical cancer    Ovarian cancer Mother     Diabetes Father     Coronary artery disease Father     Colon cancer Maternal Grandmother         in her 50's     Social History     Tobacco Use    Smoking status: Former     Types: Cigarettes     Quit date: 2020     Years since quittin.0    Smokeless tobacco: Never   Substance Use Topics    Alcohol use: No    Drug use: No       Review of Systems   Constitutional: Negative.    Respiratory: Negative.     Gastrointestinal: Negative.    Genitourinary: Negative.    Skin: Negative.    All other systems reviewed and are negative.    Vitals:    22 1446   BP: 132/70   Pulse: 84       PHYSICAL EXAMINATION:  General: no distress, well nourished  Skin: color, texture, turgor normal. No rash or lesions  HEENT: Eyes: reactive pupils, normal conjunctiva. Oral mucosa moist, no ulcers. Throat: no erythema.  Neck: supple, symmetrical, trachea midline, no JVD, no carotid bruit  Lungs: clear to auscultation bilaterally and normal respiratory effort  Cardiovascular: Heart: regular rate and rhythm, S1, S2 normal, no  murmur, rub or gallop. Pulses: 2+ and symmetric.  Abdomen: bowel sounds present, no abdominal bruit, soft, non-tender non-distented; no masses, organomegaly or ascites.   Musculoskeletal: trace pitting edema in lower extremities, no clubbing or cyanosis  Lymph Nodes: No cervical or supraclavicular adenopathy  Neurologic: AAOx3, normal strength and tone. No focal deficit. No asterixis.       LABORATORY DATA:  Lab Results   Component Value Date    CREATININE 1.5 (H) 12/05/2022       Prot/Creat Ratio, Urine   Date Value Ref Range Status   12/05/2022 0.25 (H) 0.00 - 0.20 Final   04/14/2022 0.37 (H) 0.00 - 0.20 Final       Lab Results   Component Value Date     12/05/2022    K 4.6 12/05/2022    CO2 24 12/05/2022       Lab Results   Component Value Date    PTH 30.1 12/05/2022    CALCIUM 9.5 12/05/2022    PHOS 3.3 12/05/2022       Lab Results   Component Value Date    HGB 10.4 (L) 12/05/2022        Lab Results   Component Value Date    HGBA1C 10.2 (H) 12/05/2022       Lab Results   Component Value Date    LDLCALC 91.0 09/30/2022         IMAGING STUDIES      IMPRESSION / RECOMMENDATIONS:     1. Calculus of kidney  Reportedly uric acid stones. Will institute standard measure: high water intake (2-3 L per day or more), low Na diet, low protein diet. Will obtain kidney US to assess stone burden. Will consult renal dietitian       2. Stage 3b chronic kidney disease  Minimally proteinuric, low risk for progression to ESRD. Unclear etiology, metabolic syndrome, chronic parenchymal scarring from nephrolithiasis, diabetic nephropathy, renovascular hypertensive nephropathy are among some of the potential culprits. Takes an ARB, well controlled hypertension. We may consider stopping fenofibrate to eliminate confounders for serum Cr          SUMMARY OF PLAN:  Continue losartan, nifedipine  Consult Renal Dietitian: low Na, low protein, high water intake  Renal US  4.   RTC in 6 mo

## 2022-12-09 ENCOUNTER — TELEPHONE (OUTPATIENT)
Dept: DIABETES | Facility: CLINIC | Age: 77
End: 2022-12-09
Payer: MEDICARE

## 2022-12-12 ENCOUNTER — OFFICE VISIT (OUTPATIENT)
Dept: PAIN MEDICINE | Facility: CLINIC | Age: 77
End: 2022-12-12
Payer: MEDICARE

## 2022-12-12 VITALS
BODY MASS INDEX: 35.3 KG/M2 | RESPIRATION RATE: 18 BRPM | DIASTOLIC BLOOD PRESSURE: 77 MMHG | WEIGHT: 187 LBS | HEART RATE: 80 BPM | HEIGHT: 61 IN | SYSTOLIC BLOOD PRESSURE: 142 MMHG | TEMPERATURE: 97 F

## 2022-12-12 DIAGNOSIS — G89.4 CHRONIC PAIN DISORDER: ICD-10-CM

## 2022-12-12 DIAGNOSIS — E08.42 DIABETIC POLYNEUROPATHY ASSOCIATED WITH DIABETES MELLITUS DUE TO UNDERLYING CONDITION: ICD-10-CM

## 2022-12-12 DIAGNOSIS — E11.42 DIABETIC PERIPHERAL NEUROPATHY: Primary | ICD-10-CM

## 2022-12-12 DIAGNOSIS — G89.4 CHRONIC PAIN SYNDROME: ICD-10-CM

## 2022-12-12 PROCEDURE — 99999 PR PBB SHADOW E&M-EST. PATIENT-LVL V: ICD-10-PCS | Mod: PBBFAC,,, | Performed by: NURSE PRACTITIONER

## 2022-12-12 PROCEDURE — 99999 PR PBB SHADOW E&M-EST. PATIENT-LVL V: CPT | Mod: PBBFAC,,, | Performed by: NURSE PRACTITIONER

## 2022-12-12 PROCEDURE — 99214 PR OFFICE/OUTPT VISIT, EST, LEVL IV, 30-39 MIN: ICD-10-PCS | Mod: S$PBB,,, | Performed by: NURSE PRACTITIONER

## 2022-12-12 PROCEDURE — 99215 OFFICE O/P EST HI 40 MIN: CPT | Mod: PBBFAC | Performed by: NURSE PRACTITIONER

## 2022-12-12 PROCEDURE — 99214 OFFICE O/P EST MOD 30 MIN: CPT | Mod: S$PBB,,, | Performed by: NURSE PRACTITIONER

## 2022-12-12 RX ORDER — HYDROCODONE BITARTRATE AND ACETAMINOPHEN 10; 325 MG/1; MG/1
1 TABLET ORAL EVERY 8 HOURS PRN
Qty: 90 TABLET | Refills: 0 | Status: SHIPPED | OUTPATIENT
Start: 2022-12-12 | End: 2023-01-12 | Stop reason: SDUPTHER

## 2022-12-12 NOTE — PROGRESS NOTES
Chronic patient Established Note (Follow up visit)    Interval History 12/13/2022:  Mrs Waters presents for follow up of chronic pain. She is ready to repeat Qutenza application as it has been >91 days and she had significant improvement of symptoms of PDN. She recently had repeat A1C and just above 10.0 but is decreasing. She continues to take medication with benefit and denies SE of medication. Medication regimen include Norco 10/325mg TID, Robaxin 500mg and Lyrica 150mg.     Interval History 10/12/2022:  Mrs Waters presents for follow up of chronic neuropathy. She is s/p qutenza patch placement with improved functioning and neuropathy control. Unfortunately her A1C was above 11 which inhibits her from Nevro SCS trial at this time. She is eager to have SCS trial. She denies new areas of pain or neurological changes. She continued to take  Norco 10/325mg TID, Robaxin 500mg and Lyrica 150mg TID with benefit and denies significant SE of medications.     Interval History 9/8/2022:  Mrs Waters presents for follow up of chronic lower back painn and radicular pain in conjunction with PDN to bilateral feet. She is doing fair with medication mgt of Norco, Robaxin, and Lyrica and does need refill at this time. She denies SE of medications. She is patiently awaiting her A1C to become lower than 10 to proceed with SCS trial.     Interval History 8/12/2022:  Mrs Waters presents for delayed FU. She has had continuous pain to lumbar and radicular pain but also peripheral neuropathy complaints. Over interval she has had CVA but doing fair. Her A1C has unfortunately elevated above 10 at this time and SCS trial placed on hold but phychiatric evaluation complete. She continues to take Norco 10/325mg TID, Robaxin 500mg TID and Lyrica 150mg TID with some benefit and denies SE of medications. No s/s concerning for cauda equina.     Interval History 4/12/2022:  Patient returns to clinic today for follow-up. Her neuropathic pain continues to  be an issue for her, but she says that she is able to manage. She is taking Norco 10-325mg TID, Robaxin 500mg TID, and Lyrica 150mg TID without any adverse side effects. She denies any changes in her symptoms. She would like to proceed with SCS trial. Discussed last time that her HbA1c should be <10, was 9.8 on most recent labs. Seen by psychology and cleared for SCS.     Interval History 2/14/2022:  Mrs Waters presents for follow up. Pt states overall neuropathy continues. Since last visit she has been stable with medication mgt and takign Norco 10/325mg TID, Robaxin 500mg TID and Lyrica 150mg TID. She denies new areas of pain or neurological changes. Medication adequate to control pain without adverse SE.      Interval History 12/21/2021:  Pt presents for urgent evaluation s/p fall in kitchen last night. Pt unsure of LOC or head trauma but states some amnesia of events. Pt is having left knee pain, B ankle pain L>R and lower back/buttock pain. Daughter further states tremor like activity last night. During visit daughter asked for bedside commode due to inability to ambulate.  Pt during visit appeared somnolent, pale and began vomiting. Discussed going to ER for further evaluation.      Interval History 12/14/2021:  Mrs Waters presents for follow up of chronic pain complaints. She is hopeful she will have A1C lower soon to move forward with SCS. She is doing fair with medication mgt alone at this time and denies SE of medications. Pt is currently taking Norco 10/325mg TID PRN, Lyrica 150mg TID, and Robaxin 500mg TID. She denies new areas of pain or neurological changes.      Interval History 10/14/2021:  The Pt presents for follow up and s/p B Lumbar sympathetic blocks. Pt states this has done well but ready to proceed with SCS trial. She is aware A1C must be under tight control and Pt and daughter re-iterate knowing this. Pt also mentions DKA admission and diagnosis of vasculitis as well over interval. Pt continues  to take hydrocodone 10/325 mg TID PRN, Lyrica 150 mg TID, and Robaxin 500mg TID. She denies any SE of medication, denies new neurological changes.      Interval Hx 09/16/2021  Indira Waters presents to the clinic for a follow-up appointment for f/u after bilateral lumbar sympathetic block on 8/25/21.Patient reports >50% relief after lumbar sympathetic block that lasted 2 weeks when she then developed a UTI/DKA, was admitted to the hospital and pain recurred.  Current pain intensity is 8/10.     Interval History 8/12/2021:  Indira Waters presents to the clinic for a follow-up appointment for BLE diabetic neuropathy, painful. Continues with Norco 10/325mg, Lyrica 150mg TID, Robaxin 750mg daily PRN. She had to cancel previously scheduled lumbar sympathetic block 2/2 lithotripsy for painful kidney stones, which have now passed. She still has intermittent pain with urination but overall that pain is improving. She had to cancel previous angiogram for this same reason - this has not been rescheduled yet. She denies any change in her LE pain. Denies any new neurological sxs in BLEs.     Interval History 6/10/21:  Indira Waters presents to the clinic for a 2 week follow-up appointment from lumbar sympathetic nerve block in early May. She reports minimal relief from this intervention, but did note that it helped some, briefly. Since the last visit, Indira Waters states the pain has been persistant. Current pain intensity is 10/10. Patient has chronic generalized diffuse pain, most pronounced in her BL lower extremities 2/2 diabetic neuropathy. HSe states that the pain is a little better than at her last visit. Most days are 10/10, but some days are better than others. Her pain is aggravated by exertion, walking, or sitting/standing for extended periods of time. He pain is mildly improved by rest and medications. She is currently taking Lyrica 150 PO TID, Zoloft, and Norco 10-325mg TID PRN. She states that the  "pain meds are helping but she is still constantly in pain and unable to participate in her ADLs. She has now established care with PCP for assistance w/ poorly controlled T2DM, last A1c 11.4. She has not yet established care w/ Rheumatology for fibromyalgia management.    Interval HPI 4/15/21:  Patient returns for follow up of chronic generalized diffuse pain. At the last visit, had EMG (not yet completed), lumbar and hip x-ray imaging ordered. She was also referred to Rheumatology for fibromyalgia management and referral to PCP for DM2 management and weaning of zoloft for transition to cymbalta for treatment of neuropathy. She had her Lyrica increased to 150mg PO TID, and prescribed Norco 10mg TID with opioid contract signed. She has been taking Norco 10mg TID and it has been helping her "bad" pains but does not take all of her pain away. She has not yet been set up with her new PCP or rheumatologist yet for fibromyalgia. Still continues to have generalized pain everywhere including feet, hips, legs, lower and upper back, neck and shoulder pain. Continues to have neuropathy in the bilateral lower extremities due to uncontrolled DM2.    Initial Visit 3/11/21:  Indira Waters presents to the clinic for the evaluation of chronic pain. Complaining of pain everywhere including feet, legs, hips, lower and upper back, neck, shoulder. Pain started 5+ years ago. Pain 10/10 at worse. She was referred here by her PCP Dr. Ramos who she has been seeing for over 6 years. She states her pain is aggravated by any physical activity/movement. She takes lyrica, robaxin, and Norco 10 TID with mild relief of pain. Per patient, she was referred here by her PCP for medication refill. She has not tried physical therapy for several years, she states it did not help last time she was in PT. She says she is trying to exercise daily by doing yoga. She walks with a walker. She has numerous comorbidities including DM2 (Last A1C 9+ per prior " family medicine note in Jan 2021), fibromyalgia, lupus, DDD. She states she would like to find a new PCP as she no longer lives near her old one. Patient denies night fever/night sweats, urinary incontinence, bowel incontinence and significant weight loss.      Pain Disability Index Review:  Last 3 PDI Scores 10/12/2022 9/8/2022 8/11/2022   Pain Disability Index (PDI) 40 35 40     Pain Medications:  Norco 10-325mg TID, Robaxin 500mg TID, and Lyrica 150mg TID    Opioid Contract: yes     report:  Reviewed and consistent with medication use as prescribed.    Pain Procedures:    - reports possible back injection 10+ years ago  - Lumbar sympathetic nerve block 05/05/21 - Dr. Pereira - minimal relief    Physical Therapy/Home Exercise: no     Imaging:   Hip X-ray 4/7/21  FINDINGS:  Osseous structures appear intact without evidence of fracture or osseous destructive process.  No apparent dislocation.     Modest degenerative change or significant joint space narrowing.     Lumbar X-ray 4/7/21  FINDINGS:  Diffuse bony demineralization.  Vertebral bodies are normal in height without evidence of fracture.  No pars defects.     Normal sagittal alignment is preserved.  No spondylolisthesis. No abnormal translation with flexion and extension.     Intervertebral disc heights are well maintained.  Mild facet arthropathy in the lower lumbar spine.     Mild scattered vascular calcification.     Impression:     No evidence of fracture or malalignment.     Mild facet arthropathy in the lower lumbar spine.     Diffuse bony demineralization.  Consider correlation with DEXA.    Allergies:   Review of patient's allergies indicates:   Allergen Reactions    Bleach (sodium hypochlorite) Shortness Of Breath    Nitrofurantoin macrocrystalline Anaphylaxis    Lipitor [atorvastatin] Diarrhea and Rash    Nsaids (non-steroidal anti-inflammatory drug)     Pcn [penicillins]     Toradol [ketorolac]        Current Medications:   Current Outpatient  Medications   Medication Sig Dispense Refill    acetaminophen (TYLENOL) 500 MG tablet Take 2 tablets (1,000 mg total) by mouth every 8 (eight) hours as needed for Pain.  0    allopurinoL (ZYLOPRIM) 300 MG tablet Take 1 tablet (300 mg total) by mouth once daily. 90 tablet 1    aspirin (ECOTRIN) 81 MG EC tablet Take 1 tablet (81 mg total) by mouth once daily. 30 tablet 0    blood sugar diagnostic Strp 1 each by Misc.(Non-Drug; Combo Route) route 4 (four) times daily. 200 each 0    blood-glucose meter Misc Follow package directions (Patient taking differently: Follow package directions) 1 each 0    blood-glucose meter,continuous (DEXCOM G6 ) Misc 1 each by Misc.(Non-Drug; Combo Route) route continuous prn. 1 each PRN    blood-glucose sensor (DEXCOM G6 SENSOR) Nicole 3 each by Misc.(Non-Drug; Combo Route) route continuous prn. Change every 10 days. 3 each PRN    blood-glucose transmitter (DEXCOM G6 TRANSMITTER) Nicole 1 each by Misc.(Non-Drug; Combo Route) route continuous prn. 1 each PRN    cloNIDine (CATAPRES) 0.1 MG tablet Take 1 tablet (0.1 mg total) by mouth 2 (two) times daily. 180 tablet 0    clopidogreL (PLAVIX) 75 mg tablet Take 1 tablet (75 mg total) by mouth once daily. 30 tablet 3    evolocumab (REPATHA SURECLICK) 140 mg/mL PnIj Inject 1 mL (140 mg total) into the skin every 14 (fourteen) days. 8 each 3    fenofibrate micronized (LOFIBRA) 134 MG Cap Take 1 capsule (134 mg total) by mouth daily with breakfast. 90 capsule 3    furosemide (LASIX) 40 MG tablet Take 1 tablet (40 mg total) by mouth once daily. 30 tablet 11    insulin aspart U-100 (NOVOLOG) 100 unit/mL (3 mL) InPn pen Inject 12 Units into the skin 3 (three) times daily with meals. Plus correction scale. Max TDD 40units. 15 mL 3    insulin detemir U-100 (LEVEMIR FLEXTOUCH U-100 INSULN) 100 unit/mL (3 mL) InPn pen Inject 20 Units into the skin 2 (two) times daily. 15 mL 3    ipratropium-albuteroL (COMBIVENT)  mcg/actuation inhaler Inhale 1  "puff into the lungs by mouth every 6 (six) hours. 4 g 0    losartan (COZAAR) 50 MG tablet Take 1 tablet (50 mg total) by mouth once daily. 90 tablet 0    methocarbamoL (ROBAXIN) 500 MG Tab Take 1 tablet (500 mg total) by mouth 3 (three) times daily as needed (muscle spasm). 90 tablet 2    metoclopramide HCl (REGLAN) 5 MG tablet Take 1 tablet (5 mg total) by mouth 4 (four) times daily as needed (nausea, prevention). 30 tablet 3    metoprolol succinate (TOPROL-XL) 100 MG 24 hr tablet Take 1 tablet (100 mg total) by mouth once daily. 90 tablet 3    NIFEdipine (PROCARDIA-XL) 30 MG (OSM) 24 hr tablet Take 1 tablet (30 mg total) by mouth 2 (two) times a day. 180 tablet 0    nitroGLYCERIN (NITROSTAT) 0.4 MG SL tablet Place 1 tablet (0.4 mg total) under the tongue every 5 (five) minutes as needed for Chest pain. 25 tablet 5    pantoprazole (PROTONIX) 40 MG tablet Take 1 tablet (40 mg total) by mouth once daily. 30 tablet 0    pen needle, diabetic (BD ULTRA-FINE HIEN PEN NEEDLE) 32 gauge x 5/32" Ndle 1 each by Misc.(Non-Drug; Combo Route) route 4 (four) times daily. 400 each 3    pregabalin (LYRICA) 150 MG capsule Take 1 capsule (150 mg total) by mouth 3 (three) times daily. 90 capsule 2    senna-docusate 8.6-50 mg (PERICOLACE) 8.6-50 mg per tablet Take 1 tablet by mouth 2 (two) times daily as needed for Constipation. 60 tablet 0    sertraline (ZOLOFT) 100 MG tablet Take 1 tablet (100 mg total) by mouth once daily. 90 tablet 3    teriparatide 20 mcg/dose (620mcg/2.48mL) PnIj Inject 20 mcg into the skin once daily. 2.48 mL 11    triamcinolone acetonide 0.1% (KENALOG) 0.1 % cream Apply to affected areas of body twice daily as needed rash. Do not use on face, underarms, or groin. 454 g 0    TRUEPLUS LANCETS 30 gauge Misc TEST AS DIRECTED FOUR TIMES DAILY 200 each 2    HYDROcodone-acetaminophen (NORCO)  mg per tablet Take 1 tablet by mouth every 8 (eight) hours as needed for Pain. 90 tablet 0     No current " facility-administered medications for this visit.       REVIEW OF SYSTEMS:    GENERAL:  No weight loss, malaise or fevers.  HEENT:  Negative for frequent or significant headaches.  NECK:  Negative for lumps, goiter, pain and significant neck swelling.  RESPIRATORY:  Negative for cough, wheezing or shortness of breath.  CARDIOVASCULAR:  Negative for chest pain, leg swelling or palpitations.  GI:  Negative for abdominal discomfort, blood in stools or black stools or change in bowel habits.  MUSCULOSKELETAL:  See HPI.  SKIN:  Negative for lesions, rash, and itching.  PSYCH:  Negative for sleep disturbance, mood disorder and recent psychosocial stressors.  HEMATOLOGY/LYMPHOLOGY:  Negative for prolonged bleeding, bruising easily or swollen nodes.  NEURO:   No history of headaches, syncope, paralysis, seizures or tremors.  All other reviewed and negative other than HPI.    Past Medical History:  Past Medical History:   Diagnosis Date    Arthritis     Back pain     Cancer     ovarian    Depression     Diabetes mellitus     Fibromyalgia     Hyperlipidemia     Hypertension     Lupus     Stroke     slight left sided weakness       Past Surgical History:  Past Surgical History:   Procedure Laterality Date    APPENDECTOMY       SECTION      CORONARY ANGIOGRAPHY N/A 2022    Procedure: ANGIOGRAM, CORONARY ARTERY;  Surgeon: Jose L Méndez MD;  Location: [facility] CATH LAB;  Service: Cardiology;  Laterality: N/A;    HYSTERECTOMY      INJECTION OF ANESTHETIC AGENT AROUND NERVE Bilateral 2021    Procedure: BLOCK, NERVE, SYMPATHIC;  Surgeon: Holden Pereira MD;  Location: [facility];  Service: Pain Management;  Laterality: Bilateral;    INJECTION OF ANESTHETIC AGENT AROUND NERVE N/A 2021    Procedure: BLOCK, NERVE, SYMPATHETIC  need consent;  Surgeon: Holden Pereira MD;  Location: [facility];  Service: Pain Management;  Laterality: N/A;    YARED LINK,SWALLOW FUNCTION,CINE/VIDEO RECORD  2021          "TONSILLECTOMY         Family History:  Family History   Problem Relation Age of Onset    COPD Mother     Lupus Mother     Hernia Mother     Uterine cancer Mother         vs cervical cancer    Ovarian cancer Mother     Diabetes Father     Coronary artery disease Father     Colon cancer Maternal Grandmother         in her 50's       Social History:  Social History     Socioeconomic History    Marital status:    Tobacco Use    Smoking status: Former     Types: Cigarettes     Quit date: 2020     Years since quittin.1    Smokeless tobacco: Never   Substance and Sexual Activity    Alcohol use: No    Drug use: No    Sexual activity: Not Currently     Social Determinants of Health     Financial Resource Strain: Low Risk     Difficulty of Paying Living Expenses: Not hard at all   Food Insecurity: No Food Insecurity    Worried About Running Out of Food in the Last Year: Never true    Ran Out of Food in the Last Year: Never true   Transportation Needs: No Transportation Needs    Lack of Transportation (Medical): No    Lack of Transportation (Non-Medical): No   Physical Activity: Insufficiently Active    Days of Exercise per Week: 3 days    Minutes of Exercise per Session: 20 min   Stress: No Stress Concern Present    Feeling of Stress : Not at all   Social Connections: Socially Isolated    Frequency of Communication with Friends and Family: More than three times a week    Frequency of Social Gatherings with Friends and Family: Never    Attends Orthodox Services: Never    Active Member of Clubs or Organizations: No    Attends Club or Organization Meetings: Never    Marital Status:    Housing Stability: Low Risk     Unable to Pay for Housing in the Last Year: No    Number of Places Lived in the Last Year: 1    Unstable Housing in the Last Year: No       OBJECTIVE:    BP (!) 142/77   Pulse 80   Temp 97 °F (36.1 °C) (Oral)   Resp 18   Ht 5' 1" (1.549 m)   Wt 84.8 kg (187 lb)   BMI 35.33 kg/m² "     PHYSICAL EXAMINATION:    General appearance: Well appearing, in no acute distress, alert and oriented x3.  Psych:  Mood and affect appropriate.  Skin: Skin color, texture, turgor normal, no rashes or lesions, in both upper and lower body.  Head/face:  Atraumatic, normocephalic. No palpable lymph nodes  Cor: RRR  Pulm: Non-labored  Back: Straight leg raising in the sitting and supine positions is negative to radicular pain. No pain to palpation over the spine or costovertebral angles.  Extremities: Peripheral joint ROM is full and pain free without obvious instability or laxity in all four extremities. No deformities, edema, or skin discoloration. Good capillary refill.  Musculoskeletal: Bilateral upper and lower extremity strength is normal and symmetric.  No atrophy or tone abnormalities are noted.  Neuro: Muscle stretch reflexes are diminished but symmetric. Altered sensation in BLE at baseline. Painful paresthesias in BLE.   Gait: Antalgic, uses cane for ambulation today     ASSESSMENT: 77 y.o. year old female with chronic pain, consistent with:     No diagnosis found.        PLAN:     - I have stressed the importance of physical activity and a home exercise plan to help with pain and improve health.  - Patient can continue with medications for now since they are providing benefits, using them appropriately, and without side effects.  - Psych evaluation complete but now waiting till A1C can drop below 10 again (recent was 10.2 and trending downward)  - s/f repeat Qutenza application   - When  HbA1c becomes <10, will proceed with SCS trial with Nevro for PDN  - UDS 2/14/22 reviewed.   - Continue Robaxin 500mg TID #90  - Refilled Norco 10/325mg TID #90   - Continue Lyrica 150mg TID #90  - RTC for Qutenza application. 8 weeks for re-evaluation of symptoms, monitor A1C   - Counseled patient regarding the importance of activity modification, constant sleeping habits and physical therapy.    The above plan and  management options were discussed at length with patient. Patient is in agreement with the above and verbalized understanding.    Colin Gibbs NP  12/13/2022    I spent a total of 30 minutes on the day of the visit.  This includes face to face time and non-face to face time preparing to see the patient by reviewing previous labs/imaging, obtaining and/or reviewing separately obtained history, documenting clinical information in the electronic or other health record, independently interpreting results and communicating results to the patient/family/caregiver.

## 2022-12-13 ENCOUNTER — PATIENT MESSAGE (OUTPATIENT)
Dept: PHARMACY | Facility: CLINIC | Age: 77
End: 2022-12-13
Payer: MEDICARE

## 2022-12-13 ENCOUNTER — SPECIALTY PHARMACY (OUTPATIENT)
Dept: PHARMACY | Facility: CLINIC | Age: 77
End: 2022-12-13
Payer: MEDICARE

## 2022-12-15 ENCOUNTER — PES CALL (OUTPATIENT)
Dept: ADMINISTRATIVE | Facility: CLINIC | Age: 77
End: 2022-12-15
Payer: MEDICARE

## 2022-12-16 ENCOUNTER — PATIENT MESSAGE (OUTPATIENT)
Dept: PHARMACY | Facility: CLINIC | Age: 77
End: 2022-12-16
Payer: MEDICARE

## 2022-12-16 NOTE — TELEPHONE ENCOUNTER
Specialty Pharmacy - Refill Coordination    Specialty Medication Orders Linked to Encounter      Flowsheet Row Most Recent Value   Medication #1 evolocumab (REPATHA SURECLICK) 140 mg/mL PnIj (Order#[id number], Rx#[id number])            Refill Questions - Documented Responses      Flowsheet Row Most Recent Value   Patient Availability and HIPAA Verification    Does patient want to proceed with activity? Yes   HIPAA/medical authority confirmed? Yes   Relationship to patient of person spoken to? Child   Refill Screening Questions    Would patient like to speak to a pharmacist? No   When does the patient need to receive the medication? 12/23/22   Refill Delivery Questions    How will the patient receive the medication? MEDRx   When does the patient need to receive the medication? 12/23/22   Shipping Address Home   Address in [facility] confirmed and updated if neccessary? Yes   Expected Copay ($) 0   Is the patient able to afford the medication copay? Yes   Payment Method zero copay   Days supply of Refill 28   Supplies needed? No supplies needed   Refill activity completed? Yes   Refill activity plan Refill scheduled   Shipment/Pickup Date: 12/21/22            Current Outpatient Medications   Medication Sig    acetaminophen (TYLENOL) 500 MG tablet Take 2 tablets (1,000 mg total) by mouth every 8 (eight) hours as needed for Pain.    allopurinoL (ZYLOPRIM) 300 MG tablet Take 1 tablet (300 mg total) by mouth once daily.    aspirin (ECOTRIN) 81 MG EC tablet Take 1 tablet (81 mg total) by mouth once daily.    blood sugar diagnostic Strp 1 each by Misc.(Non-Drug; Combo Route) route 4 (four) times daily.    blood-glucose meter Misc Follow package directions (Patient taking differently: Follow package directions)    blood-glucose meter,continuous (DEXCOM G6 ) Misc 1 each by Misc.(Non-Drug; Combo Route) route continuous prn.    blood-glucose sensor (DEXCOM G6 SENSOR) Nicole 3 each by Misc.(Non-Drug; Combo Route)  route continuous prn. Change every 10 days.    blood-glucose transmitter (DEXCOM G6 TRANSMITTER) Nicole 1 each by Misc.(Non-Drug; Combo Route) route continuous prn.    cloNIDine (CATAPRES) 0.1 MG tablet Take 1 tablet (0.1 mg total) by mouth 2 (two) times daily.    clopidogreL (PLAVIX) 75 mg tablet Take 1 tablet (75 mg total) by mouth once daily.    evolocumab (REPATHA SURECLICK) 140 mg/mL PnIj Inject 1 mL (140 mg total) into the skin every 14 (fourteen) days.    fenofibrate micronized (LOFIBRA) 134 MG Cap Take 1 capsule (134 mg total) by mouth daily with breakfast.    furosemide (LASIX) 40 MG tablet Take 1 tablet (40 mg total) by mouth once daily.    HYDROcodone-acetaminophen (NORCO)  mg per tablet Take 1 tablet by mouth every 8 (eight) hours as needed for Pain.    insulin aspart U-100 (NOVOLOG) 100 unit/mL (3 mL) InPn pen Inject 12 Units into the skin 3 (three) times daily with meals. Plus correction scale. Max TDD 40units.    insulin detemir U-100 (LEVEMIR FLEXTOUCH U-100 INSULN) 100 unit/mL (3 mL) InPn pen Inject 20 Units into the skin 2 (two) times daily.    ipratropium-albuteroL (COMBIVENT)  mcg/actuation inhaler Inhale 1 puff into the lungs by mouth every 6 (six) hours.    losartan (COZAAR) 50 MG tablet Take 1 tablet (50 mg total) by mouth once daily.    methocarbamoL (ROBAXIN) 500 MG Tab Take 1 tablet (500 mg total) by mouth 3 (three) times daily as needed (muscle spasm).    metoclopramide HCl (REGLAN) 5 MG tablet Take 1 tablet (5 mg total) by mouth 4 (four) times daily as needed (nausea, prevention).    metoprolol succinate (TOPROL-XL) 100 MG 24 hr tablet Take 1 tablet (100 mg total) by mouth once daily.    NIFEdipine (PROCARDIA-XL) 30 MG (OSM) 24 hr tablet Take 1 tablet (30 mg total) by mouth 2 (two) times a day.    nitroGLYCERIN (NITROSTAT) 0.4 MG SL tablet Place 1 tablet (0.4 mg total) under the tongue every 5 (five) minutes as needed for Chest pain.    pantoprazole (PROTONIX) 40 MG tablet Take  "1 tablet (40 mg total) by mouth once daily.    pen needle, diabetic (BD ULTRA-FINE HIEN PEN NEEDLE) 32 gauge x 5/32" Ndle 1 each by Misc.(Non-Drug; Combo Route) route 4 (four) times daily.    pregabalin (LYRICA) 150 MG capsule Take 1 capsule (150 mg total) by mouth 3 (three) times daily.    senna-docusate 8.6-50 mg (PERICOLACE) 8.6-50 mg per tablet Take 1 tablet by mouth 2 (two) times daily as needed for Constipation.    sertraline (ZOLOFT) 100 MG tablet Take 1 tablet (100 mg total) by mouth once daily.    teriparatide 20 mcg/dose (620mcg/2.48mL) PnIj Inject 20 mcg into the skin once daily.    triamcinolone acetonide 0.1% (KENALOG) 0.1 % cream Apply to affected areas of body twice daily as needed rash. Do not use on face, underarms, or groin.    TRUEPLUS LANCETS 30 gauge Misc TEST AS DIRECTED FOUR TIMES DAILY   Last reviewed on 12/13/2022  8:36 AM by Colin Gibbs NP    Review of patient's allergies indicates:   Allergen Reactions    Bleach (sodium hypochlorite) Shortness Of Breath    Nitrofurantoin macrocrystalline Anaphylaxis    Lipitor [atorvastatin] Diarrhea and Rash    Nsaids (non-steroidal anti-inflammatory drug)     Pcn [penicillins]     Toradol [ketorolac]     Last reviewed on  12/13/2022 8:36 AM by Colin Gibbs      Tasks added this encounter   1/13/2023 - Refill Call (Auto Added)   Tasks due within next 3 months   No tasks due.     Edne Candelaria, PharmD  [facility]  [address] [place] [id number]  Phone: [phone]  Fax: [phone]        "

## 2022-12-27 ENCOUNTER — PATIENT MESSAGE (OUTPATIENT)
Dept: CARDIOLOGY | Facility: CLINIC | Age: 77
End: 2022-12-27
Payer: MEDICARE

## 2022-12-28 ENCOUNTER — PATIENT MESSAGE (OUTPATIENT)
Dept: PHARMACY | Facility: CLINIC | Age: 77
End: 2022-12-28
Payer: MEDICARE

## 2022-12-29 ENCOUNTER — SPECIALTY PHARMACY (OUTPATIENT)
Dept: PHARMACY | Facility: CLINIC | Age: 77
End: 2022-12-29
Payer: MEDICARE

## 2022-12-29 NOTE — TELEPHONE ENCOUNTER
Specialty Pharmacy - Refill Coordination    Specialty Medication Orders Linked to Encounter      Flowsheet Row Most Recent Value   Medication #1 teriparatide 20 mcg/dose (620mcg/2.48mL) PnIj (Order#[id number], Rx#[id number])            Refill Questions - Documented Responses      Flowsheet Row Most Recent Value   Patient Availability and HIPAA Verification    Does patient want to proceed with activity? Yes   HIPAA/medical authority confirmed? Yes   Relationship to patient of person spoken to? Family Member   Refill Screening Questions    Changes to allergies? No   Changes to medications? No   New conditions since last clinic visit? No   Unplanned office visit, urgent care, ED, or hospital admission in the last 4 weeks? No   How does patient/caregiver feel medication is working? Excellent   Financial problems or insurance changes? No   How many doses of your specialty medications were missed in the last 4 weeks? 0   Would patient like to speak to a pharmacist? No   When does the patient need to receive the medication? 01/05/23   Refill Delivery Questions    How will the patient receive the medication? MEDRx   When does the patient need to receive the medication? 01/05/23   Shipping Address Home   Address in [facility] confirmed and updated if neccessary? Yes   Expected Copay ($) 0   Is the patient able to afford the medication copay? Yes   Payment Method zero copay   Days supply of Refill 28   Supplies needed? Pen needles   Refill activity completed? Yes   Refill activity plan Refill scheduled   Shipment/Pickup Date: 01/04/23            Current Outpatient Medications   Medication Sig    acetaminophen (TYLENOL) 500 MG tablet Take 2 tablets (1,000 mg total) by mouth every 8 (eight) hours as needed for Pain.    allopurinoL (ZYLOPRIM) 300 MG tablet Take 1 tablet (300 mg total) by mouth once daily.    aspirin (ECOTRIN) 81 MG EC tablet Take 1 tablet (81 mg total) by mouth once daily.    blood sugar diagnostic Strp 1  each by Misc.(Non-Drug; Combo Route) route 4 (four) times daily.    blood-glucose meter Misc Follow package directions (Patient taking differently: Follow package directions)    blood-glucose meter,continuous (DEXCOM G6 ) Misc 1 each by Misc.(Non-Drug; Combo Route) route continuous prn.    blood-glucose sensor (DEXCOM G6 SENSOR) Nicole 3 each by Misc.(Non-Drug; Combo Route) route continuous prn. Change every 10 days.    blood-glucose transmitter (DEXCOM G6 TRANSMITTER) Nicole 1 each by Misc.(Non-Drug; Combo Route) route continuous prn.    cloNIDine (CATAPRES) 0.1 MG tablet Take 1 tablet (0.1 mg total) by mouth 2 (two) times daily.    clopidogreL (PLAVIX) 75 mg tablet Take 1 tablet (75 mg total) by mouth once daily.    evolocumab (REPATHA SURECLICK) 140 mg/mL PnIj Inject 1 mL (140 mg total) into the skin every 14 (fourteen) days.    fenofibrate micronized (LOFIBRA) 134 MG Cap Take 1 capsule (134 mg total) by mouth daily with breakfast.    furosemide (LASIX) 40 MG tablet Take 1 tablet (40 mg total) by mouth once daily.    HYDROcodone-acetaminophen (NORCO)  mg per tablet Take 1 tablet by mouth every 8 (eight) hours as needed for Pain.    insulin aspart U-100 (NOVOLOG) 100 unit/mL (3 mL) InPn pen Inject 12 Units into the skin 3 (three) times daily with meals. Plus correction scale. Max TDD 40units.    insulin detemir U-100 (LEVEMIR FLEXTOUCH U-100 INSULN) 100 unit/mL (3 mL) InPn pen Inject 20 Units into the skin 2 (two) times daily.    ipratropium-albuteroL (COMBIVENT)  mcg/actuation inhaler Inhale 1 puff into the lungs by mouth every 6 (six) hours.    losartan (COZAAR) 50 MG tablet Take 1 tablet (50 mg total) by mouth once daily.    methocarbamoL (ROBAXIN) 500 MG Tab Take 1 tablet (500 mg total) by mouth 3 (three) times daily as needed (muscle spasm).    metoclopramide HCl (REGLAN) 5 MG tablet Take 1 tablet (5 mg total) by mouth 4 (four) times daily as needed (nausea, prevention).    metoprolol  "succinate (TOPROL-XL) 100 MG 24 hr tablet Take 1 tablet (100 mg total) by mouth once daily.    NIFEdipine (PROCARDIA-XL) 30 MG (OSM) 24 hr tablet Take 1 tablet (30 mg total) by mouth 2 (two) times a day.    nitroGLYCERIN (NITROSTAT) 0.4 MG SL tablet Place 1 tablet (0.4 mg total) under the tongue every 5 (five) minutes as needed for Chest pain.    pantoprazole (PROTONIX) 40 MG tablet Take 1 tablet (40 mg total) by mouth once daily.    pen needle, diabetic (BD ULTRA-FINE HIEN PEN NEEDLE) 32 gauge x 5/32" Ndle 1 each by Misc.(Non-Drug; Combo Route) route 4 (four) times daily.    pregabalin (LYRICA) 150 MG capsule Take 1 capsule (150 mg total) by mouth 3 (three) times daily.    senna-docusate 8.6-50 mg (PERICOLACE) 8.6-50 mg per tablet Take 1 tablet by mouth 2 (two) times daily as needed for Constipation.    sertraline (ZOLOFT) 100 MG tablet Take 1 tablet (100 mg total) by mouth once daily.    teriparatide 20 mcg/dose (620mcg/2.48mL) PnIj Inject 20 mcg into the skin once daily.    triamcinolone acetonide 0.1% (KENALOG) 0.1 % cream Apply to affected areas of body twice daily as needed rash. Do not use on face, underarms, or groin.    TRUEPLUS LANCETS 30 gauge Misc TEST AS DIRECTED FOUR TIMES DAILY   Last reviewed on 12/13/2022  8:36 AM by Colin Gibbs NP    Review of patient's allergies indicates:   Allergen Reactions    Bleach (sodium hypochlorite) Shortness Of Breath    Nitrofurantoin macrocrystalline Anaphylaxis    Lipitor [atorvastatin] Diarrhea and Rash    Nsaids (non-steroidal anti-inflammatory drug)     Pcn [penicillins]     Toradol [ketorolac]     Last reviewed on  12/13/2022 8:36 AM by Colin Gibbs      Tasks added this encounter   1/26/2023 - Refill Call (Auto Added)   Tasks due within next 3 months   No tasks due.     Kelly Reid, PharmD  [facility]  [address] [facility] [address]  Phone: [phone]  Fax: [phone] "

## 2023-01-02 DIAGNOSIS — E08.42 DIABETIC POLYNEUROPATHY ASSOCIATED WITH DIABETES MELLITUS DUE TO UNDERLYING CONDITION: ICD-10-CM

## 2023-01-02 DIAGNOSIS — G89.4 CHRONIC PAIN DISORDER: ICD-10-CM

## 2023-01-02 DIAGNOSIS — G89.4 CHRONIC PAIN SYNDROME: ICD-10-CM

## 2023-01-02 DIAGNOSIS — I10 ESSENTIAL HYPERTENSION: ICD-10-CM

## 2023-01-02 NOTE — TELEPHONE ENCOUNTER
No new care gaps identified.  [facility] Embedded Care Gaps. Reference number: [id number]. 1/02/2023   10:19:09 AM CST

## 2023-01-03 RX ORDER — METHOCARBAMOL 500 MG/1
500 TABLET, FILM COATED ORAL 3 TIMES DAILY PRN
Qty: 90 TABLET | Refills: 2 | Status: SHIPPED | OUTPATIENT
Start: 2023-01-03 | End: 2023-03-27 | Stop reason: SDUPTHER

## 2023-01-03 RX ORDER — PREGABALIN 150 MG/1
150 CAPSULE ORAL 3 TIMES DAILY
Qty: 90 CAPSULE | Refills: 2 | Status: SHIPPED | OUTPATIENT
Start: 2023-01-03 | End: 2023-03-27 | Stop reason: SDUPTHER

## 2023-01-03 RX ORDER — CLONIDINE HYDROCHLORIDE 0.1 MG/1
0.1 TABLET ORAL 2 TIMES DAILY
Qty: 180 TABLET | Refills: 0 | Status: SHIPPED | OUTPATIENT
Start: 2023-01-03 | End: 2023-02-01 | Stop reason: SDUPTHER

## 2023-01-03 NOTE — TELEPHONE ENCOUNTER
Refill Routing Note   Medication(s) are not appropriate for processing by [facility] for the following reason(s):      - Outside of protocol    [facility] action(s):  Route          Medication reconciliation completed: No     Appointments  past 12m or future 3m with PCP    Date Provider   Last Visit   11/1/2022 Chun Zapata MD   Next Visit   Visit date not found Chun Zapata MD   ED visits in past 90 days: 0        Note composed:8:55 AM 01/03/2023

## 2023-01-04 ENCOUNTER — PATIENT MESSAGE (OUTPATIENT)
Dept: INTERNAL MEDICINE | Facility: CLINIC | Age: 78
End: 2023-01-04
Payer: MEDICARE

## 2023-01-04 ENCOUNTER — HOSPITAL ENCOUNTER (EMERGENCY)
Facility: OTHER | Age: 78
Discharge: HOME | End: 2023-01-05
Attending: EMERGENCY MEDICINE
Payer: MEDICARE

## 2023-01-04 DIAGNOSIS — E11.42 DIABETIC PERIPHERAL NEUROPATHY: Primary | ICD-10-CM

## 2023-01-04 DIAGNOSIS — M79.89 LEFT LEG SWELLING: ICD-10-CM

## 2023-01-04 DIAGNOSIS — M79.89 LEG SWELLING: Primary | ICD-10-CM

## 2023-01-04 DIAGNOSIS — M54.15 RADICULOPATHY OF THORACOLUMBAR REGION: ICD-10-CM

## 2023-01-04 DIAGNOSIS — M79.604 PAIN IN BOTH LOWER EXTREMITIES: ICD-10-CM

## 2023-01-04 DIAGNOSIS — G89.4 CHRONIC PAIN SYNDROME: ICD-10-CM

## 2023-01-04 DIAGNOSIS — E11.40 PAINFUL DIABETIC NEUROPATHY: ICD-10-CM

## 2023-01-04 DIAGNOSIS — M79.605 PAIN IN BOTH LOWER EXTREMITIES: ICD-10-CM

## 2023-01-04 LAB — POCT GLUCOSE: 256 MG/DL (ref 70–110)

## 2023-01-04 PROCEDURE — 96374 THER/PROPH/DIAG INJ IV PUSH: CPT

## 2023-01-04 PROCEDURE — 82962 GLUCOSE BLOOD TEST: CPT

## 2023-01-04 PROCEDURE — 99284 EMERGENCY DEPT VISIT MOD MDM: CPT | Mod: 25

## 2023-01-04 RX ORDER — FUROSEMIDE 10 MG/ML
40 INJECTION INTRAMUSCULAR; INTRAVENOUS
Status: COMPLETED | OUTPATIENT
Start: 2023-01-04 | End: 2023-01-05

## 2023-01-05 ENCOUNTER — PATIENT MESSAGE (OUTPATIENT)
Dept: INTERNAL MEDICINE | Facility: CLINIC | Age: 78
End: 2023-01-05
Payer: MEDICARE

## 2023-01-05 VITALS
DIASTOLIC BLOOD PRESSURE: 69 MMHG | HEART RATE: 68 BPM | SYSTOLIC BLOOD PRESSURE: 156 MMHG | BODY MASS INDEX: 33.79 KG/M2 | OXYGEN SATURATION: 96 % | WEIGHT: 179 LBS | HEIGHT: 61 IN | TEMPERATURE: 98 F | RESPIRATION RATE: 19 BRPM

## 2023-01-05 LAB
ANION GAP SERPL CALC-SCNC: 11 MMOL/L (ref 8–16)
BASOPHILS # BLD AUTO: 0.07 K/UL (ref 0–0.2)
BASOPHILS NFR BLD: 0.6 % (ref 0–1.9)
BUN SERPL-MCNC: 18 MG/DL (ref 8–23)
CALCIUM SERPL-MCNC: 9.5 MG/DL (ref 8.7–10.5)
CHLORIDE SERPL-SCNC: 103 MMOL/L (ref 95–110)
CO2 SERPL-SCNC: 24 MMOL/L (ref 23–29)
CREAT SERPL-MCNC: 1.4 MG/DL (ref 0.5–1.4)
DIFFERENTIAL METHOD: ABNORMAL
EOSINOPHIL # BLD AUTO: 0.6 K/UL (ref 0–0.5)
EOSINOPHIL NFR BLD: 5.3 % (ref 0–8)
ERYTHROCYTE [DISTWIDTH] IN BLOOD BY AUTOMATED COUNT: 14.9 % (ref 11.5–14.5)
EST. GFR  (NO RACE VARIABLE): 39 ML/MIN/1.73 M^2
GLUCOSE SERPL-MCNC: 223 MG/DL (ref 70–110)
HCT VFR BLD AUTO: 33.7 % (ref 37–48.5)
HGB BLD-MCNC: 11.3 G/DL (ref 12–16)
IMM GRANULOCYTES # BLD AUTO: 0.05 K/UL (ref 0–0.04)
IMM GRANULOCYTES NFR BLD AUTO: 0.4 % (ref 0–0.5)
LYMPHOCYTES # BLD AUTO: 3.3 K/UL (ref 1–4.8)
LYMPHOCYTES NFR BLD: 29.1 % (ref 18–48)
MCH RBC QN AUTO: 28.7 PG (ref 27–31)
MCHC RBC AUTO-ENTMCNC: 33.5 G/DL (ref 32–36)
MCV RBC AUTO: 86 FL (ref 82–98)
MONOCYTES # BLD AUTO: 0.8 K/UL (ref 0.3–1)
MONOCYTES NFR BLD: 7.1 % (ref 4–15)
NEUTROPHILS # BLD AUTO: 6.5 K/UL (ref 1.8–7.7)
NEUTROPHILS NFR BLD: 57.5 % (ref 38–73)
NRBC BLD-RTO: 0 /100 WBC
PLATELET # BLD AUTO: 272 K/UL (ref 150–450)
PMV BLD AUTO: 11.6 FL (ref 9.2–12.9)
POCT GLUCOSE: 228 MG/DL (ref 70–110)
POTASSIUM SERPL-SCNC: 4.4 MMOL/L (ref 3.5–5.1)
RBC # BLD AUTO: 3.94 M/UL (ref 4–5.4)
SODIUM SERPL-SCNC: 138 MMOL/L (ref 136–145)
WBC # BLD AUTO: 11.34 K/UL (ref 3.9–12.7)

## 2023-01-05 PROCEDURE — 63600175 PHARM REV CODE 636 W HCPCS: Performed by: EMERGENCY MEDICINE

## 2023-01-05 PROCEDURE — 25000003 PHARM REV CODE 250: Performed by: EMERGENCY MEDICINE

## 2023-01-05 PROCEDURE — 85025 COMPLETE CBC W/AUTO DIFF WBC: CPT | Performed by: EMERGENCY MEDICINE

## 2023-01-05 PROCEDURE — 80048 BASIC METABOLIC PNL TOTAL CA: CPT | Performed by: EMERGENCY MEDICINE

## 2023-01-05 RX ORDER — HYDROCODONE BITARTRATE AND ACETAMINOPHEN 5; 325 MG/1; MG/1
2 TABLET ORAL
Status: COMPLETED | OUTPATIENT
Start: 2023-01-05 | End: 2023-01-05

## 2023-01-05 RX ORDER — FUROSEMIDE 40 MG/1
40 TABLET ORAL DAILY
Qty: 30 TABLET | Refills: 6 | Status: SHIPPED | OUTPATIENT
Start: 2023-01-05 | End: 2023-08-01

## 2023-01-05 RX ADMIN — HYDROCODONE BITARTRATE AND ACETAMINOPHEN 2 TABLET: 5; 325 TABLET ORAL at 01:01

## 2023-01-05 RX ADMIN — FUROSEMIDE 40 MG: 10 INJECTION, SOLUTION INTRAMUSCULAR; INTRAVENOUS at 12:01

## 2023-01-05 NOTE — FIRST PROVIDER EVALUATION
" Emergency Department TeleTriage Encounter Note      CHIEF COMPLAINT    Chief Complaint   Patient presents with    Leg Swelling     Reports left knee swelling with "fever" and red discoloration per daughter, onset 2 days ago. Unable to recall any event or activity, takes a blood thinner daily. (+) headache and elevated BG readings       VITAL SIGNS   Initial Vitals [01/04/23 2208]   BP Pulse Resp Temp SpO2   (!) 146/69 77 16 98.1 °F (36.7 °C) 97 %      MAP       --            ALLERGIES    Review of patient's allergies indicates:   Allergen Reactions    Bleach (sodium hypochlorite) Shortness Of Breath    Nitrofurantoin macrocrystalline Anaphylaxis    Lipitor [atorvastatin] Diarrhea and Rash    Nsaids (non-steroidal anti-inflammatory drug)     Pcn [penicillins]     Toradol [ketorolac]        PROVIDER TRIAGE NOTE  77-year-old female presenting with left lower extremity swelling and redness.  Endorses discomfort.  No trauma or injury reported.  Vital signs normal.      ORDERS  Labs Reviewed   POCT GLUCOSE - Abnormal; Notable for the following components:       Result Value    POCT Glucose 256 (*)     All other components within normal limits   POCT GLUCOSE MONITORING CONTINUOUS       ED Orders (720h ago, onward)      Start Ordered     Status Ordering Provider    01/04/23 2240 01/04/23 2239  US Lower Extremity Veins Left  1 time imaging         Ordered CATHERINE JOYCE    01/04/23 2214 01/04/23 2214  POCT glucose  Once         Final result EMERGENCY, DEPT PHYSICIAN    01/04/23 2212 01/04/23 2211  POCT glucose  Once         Acknowledged MARK ANTHONY CIFUENTES              Virtual Visit Note: The provider triage portion of this emergency department evaluation and documentation was performed via [employer], a HIPAA-compliant telemedicine application, in concert with a tele-presenter in the room. A face to face patient evaluation with one of my colleagues will occur once the patient is placed in an emergency department " room.      DISCLAIMER: This note was prepared with [employer] voice recognition transcription software. Garbled syntax, mangled pronouns, and other bizarre constructions may be attributed to that software system.

## 2023-01-05 NOTE — ED PROVIDER NOTES
"Encounter Date: 2023    SCRIBE #1 NOTE: I, Payam Moreau am scribing for, and in the presence of,  Yaniv Arguello MD. I have scribed the following portions of the note - Other sections scribed: HPI, ROS.     History     Chief Complaint   Patient presents with    Leg Swelling     Reports left knee swelling with "fever" and red discoloration per daughter, onset 2 days ago. Unable to recall any event or activity, takes a blood thinner daily. (+) headache and elevated BG readings     Time seen by provider: 10:51 PM    This is a 77 y.o. female, with PMHx of MI and CVA within the past eight months, who presents with complaint of bilateral posterior lower extremity pains. Patient states that this initially began on the left side four days ago, where the pain is currently worse. She describes shooting pains "like lightning going into me" today. Her daughter adds concerns of a firm area to the posterior left thigh as well as the back. Patient notes associated bilateral lower extremity swelling as well, and states she has been out of her Lasix for two days now. PMHx also includes DM, lupus, and herniated discs of the cervical and lumbar spine. No PSHx of cardiac stent placement. This is the extent of the patient's complaints at this time.    The history is provided by the patient and a relative.   Review of patient's allergies indicates:   Allergen Reactions    Bleach (sodium hypochlorite) Shortness Of Breath    Nitrofurantoin macrocrystalline Anaphylaxis    Lipitor [atorvastatin] Diarrhea and Rash    Nsaids (non-steroidal anti-inflammatory drug)     Pcn [penicillins]     Toradol [ketorolac]      Past Medical History:   Diagnosis Date    Arthritis     Back pain     Cancer     ovarian    Depression     Diabetes mellitus     Fibromyalgia     Hyperlipidemia     Hypertension     Lupus     Stroke     slight left sided weakness     Past Surgical History:   Procedure Laterality Date    APPENDECTOMY       SECTION   "    CORONARY ANGIOGRAPHY N/A 2022    Procedure: ANGIOGRAM, CORONARY ARTERY;  Surgeon: Jose L Méndez MD;  Location: [facility] CATH LAB;  Service: Cardiology;  Laterality: N/A;    HYSTERECTOMY      INJECTION OF ANESTHETIC AGENT AROUND NERVE Bilateral 2021    Procedure: BLOCK, NERVE, SYMPATHIC;  Surgeon: Holden Pereira MD;  Location: [facility] PAIN MGT;  Service: Pain Management;  Laterality: Bilateral;    INJECTION OF ANESTHETIC AGENT AROUND NERVE N/A 2021    Procedure: BLOCK, NERVE, SYMPATHETIC  need consent;  Surgeon: Holden Pereira MD;  Location: [facility] PAIN MGT;  Service: Pain Management;  Laterality: N/A;    NJ EVAL,SWALLOW FUNCTION,CINE/VIDEO RECORD  2021         TONSILLECTOMY       Family History   Problem Relation Age of Onset    COPD Mother     Lupus Mother     Hernia Mother     Uterine cancer Mother         vs cervical cancer    Ovarian cancer Mother     Diabetes Father     Coronary artery disease Father     Colon cancer Maternal Grandmother         in her 50's     Social History     Tobacco Use    Smoking status: Former     Types: Cigarettes     Quit date: 2020     Years since quittin.1    Smokeless tobacco: Never   Substance Use Topics    Alcohol use: No    Drug use: No     Review of Systems    Constitutional-no fever  HEENT-no congestion  Eyes-no redness  Respiratory-no shortness of breath  Cardio-no chest pain, notes leg swelling  GI-no abdominal pain  Endocrine-no cold intolerance  -no difficulty urinating  MSK-notes myalgias  Skin-no rashes  Allergy-no environmental allergy  Neurologic-, no headache  Hematology-no swollen nodes  Behavioral-no confusion    Physical Exam     Initial Vitals [23 2208]   BP Pulse Resp Temp SpO2   (!) 146/69 77 16 98.1 °F (36.7 °C) 97 %      MAP       --         Physical Exam    Constitutional: diminutive chronically ill appearing 78 yo woman in mild distress  Eyes: Conjunctivae normal.  ENT       Head: Normocephalic, atraumatic.       Nose: No  congestion.       Mouth/Throat: Mucous membranes are moist.  Hematological/Lymphatic/Immunilogical: No cervical lymphadenopathy.  Cardiovascular: Normal rate, regular rhythm. Normal and symmetric distal pulses.  Respiratory: Normal respiratory effort. Breath sounds are normal.  Gastrointestinal: Soft, nontender.   Musculoskeletal: Normal range of motion in all extremities. + 2 edema in the lower extremities and bilateral mild induration in the hamstrings   Neurologic: Alert, oriented. Normal speech and language. No gross focal neurologic deficits are appreciated.  Skin: Skin is warm, dry. No rash noted.  Psychiatric: Mood and affect are normal.     ED Course   Procedures  Labs Reviewed   CBC W/ AUTO DIFFERENTIAL - Abnormal; Notable for the following components:       Result Value    RBC 3.94 (*)     Hemoglobin 11.3 (*)     Hematocrit 33.7 (*)     RDW 14.9 (*)     Immature Grans (Abs) 0.05 (*)     Eos # 0.6 (*)     All other components within normal limits   BASIC METABOLIC PANEL - Abnormal; Notable for the following components:    Glucose 223 (*)     eGFR 39 (*)     All other components within normal limits   POCT GLUCOSE - Abnormal; Notable for the following components:    POCT Glucose 256 (*)     All other components within normal limits   POCT GLUCOSE - Abnormal; Notable for the following components:    POCT Glucose 228 (*)     All other components within normal limits          Imaging Results              US Lower Extremity Veins Left (Final result)  Result time 01/04/23 23:39:35      Final result by Duane Mims MD (01/04/23 23:39:35)                   Impression:      No evidence of deep venous thrombosis in the left lower extremity.      Electronically signed by: Duane Mims  Date:    01/04/2023  Time:    23:39               Narrative:    EXAMINATION:  US LOWER EXTREMITY VEINS LEFT    CLINICAL HISTORY:  Other specified soft tissue disorders    TECHNIQUE:  Duplex and color flow Doppler evaluation  and graded compression of the left lower extremity veins was performed.    COMPARISON:  None    FINDINGS:  Left thigh veins: The common femoral, femoral, popliteal, upper greater saphenous, and deep femoral veins are patent and free of thrombus. The veins are normally compressible and have normal phasic flow and augmentation response.    Left calf veins: The visualized calf veins are patent.    Contralateral CFV: The contralateral (right) common femoral vein is patent and free of thrombus.    Miscellaneous: None                                       Medications   furosemide injection 40 mg (40 mg Intravenous Given 1/5/23 0055)   HYDROcodone-acetaminophen 5-325 mg per tablet 2 tablet (2 tablets Oral Given 1/5/23 0104)     Medical Decision Making:   History:   Old Medical Records: I decided to obtain old medical records.  Old Records Summarized: records from clinic visits and records from previous admission(s).  Differential Diagnosis:   Dvt, edema, cellulitis,   Clinical Tests:   Lab Tests: Ordered and Reviewed  Radiological Study: Ordered and Reviewed  ED Management:  Moderate edema in the bilateral lower extremities, has been off of diuretic for the last several days.    Will plan for administration of diuretic, will plan for administration of analgesics.    Ultrasound is nondiagnostic, currently awaiting chemistry with plan for discharge following return of chemisty sparing profound derangement.  Will plan for symptom care, resumption of her diuretic in the outpatient setting and returning case of any worsening.        Scribe Attestation:   Scribe #1: I performed the above scribed service and the documentation accurately describes the services I performed. I attest to the accuracy of the note.            Physician Attestation for Scribe: I, greg calix, reviewed documentation as scribed in my presence, which is both accurate and complete.         Clinical Impression:   Final diagnoses:  [M79.89] Left leg  swelling  [M79.89] Leg swelling (Primary)  [M79.604, M79.605] Pain in both lower extremities        ED Disposition Condition    Discharge Stable          ED Prescriptions    None       Follow-up Information       Follow up With Specialties Details Why Contact Info    Chun Zapata MD Family Medicine Call in 1 day If symptoms worsen, For a follow up visit about today [address] [address]  [phone]               Yaniv Arguello MD  01/05/23 8860

## 2023-01-05 NOTE — TELEPHONE ENCOUNTER
No new care gaps identified.  [facility] Embedded Care Gaps. Reference number: [id number]. 1/05/2023   8:01:25 AM CST

## 2023-01-05 NOTE — ED TRIAGE NOTES
[facility]  Hospitalist Discharge Summary       Date of Admission:  12/30/2019  Date of Discharge:  1/1/2020  Discharging Provider: Jaquan Garces MD      Discharge Diagnoses   Possible seizure  Brain atrophy  Abnormal non specific EEG without evidence for seizure    Follow-ups Needed After Discharge    With your PCP  With neurology in one month    Unresulted Labs Ordered in the Past 30 Days of this Admission     Date and Time Order Name Status Description    12/31/2019 0001 Vitamin B1 whole blood In process     12/31/2019 0001 Anti Nuclear Dianne IgG by IFA with Reflex In process     12/31/2019 0001 Rheumatoid factor In process       These results will be followed up by PCP    Discharge Disposition   Discharged to home  Condition at discharge: Stable    Hospital Course   Megan Bettencourt is a 66 year old female admitted on 12/30/2019 with confusion and memory lapses for past couple months and possible witnessed tonic clonic seizure on sunday morning. She also had some R foot tremor witnessed by ED physician who felt it was involuntary and possible c/w focal seizure. She was admitted and neurology was consulted. She also reported right leg weakness and non volitional episodes of tremor which have resolved and she feels she is now back to her usual state of health and was thus discharge home on 1/1/2020. During her hospital stay, the folllowing issues were addressed    Suspected seizure, New cognitive deficits, short term memory loss  Head CT unremarkable. Sister at bedside notes significant mental status changes for past couple of months. Pt seems unaware of changes. Utox negative. She stays that she stopped taking clonazepam a week ago which might have led to the seizure. EEG did not show any evidence of seizure but this did not rule this out. Non specific frontal slow wave were seen. She had an MRI which showed some atrophy and no acute changes. No lab abnormality to explain her altered  Pt presents to ED with bilateral lower leg swelling and 8/10 headache; denies dizziness and blurry vision.    mental status. She is now back to normal and  Rose from neurology was consulted from ED and made recommendations.   - avoid taking benzodiazepines and if taking do not stop abruptly  - no driving till follow up in a month with Dr Rose   - discontinue Effexor which may decrease seizure threshold  It is unclear at this time what happened but seizure remain an unproven possibility may be related to medication (benzo withdrawal while on venlafaxine)     Depression/anxiety/ADHD:   Hold venlafaxine and discuss with her PCP alternative which does not decrease her seizure threshold  Hold clonazepam as patient hasn't taken in a week. Hold adderall.    SVT  While sleeping patient was noted to have a few second episode of SVT. No hx of palpitation.  - patient opted to discuss with her PCP if she wants further workup. She declined have an event monitor placed prior to discharge.      Hypothyroid: continue levothyroxine. TSH within NL    Consultations This Hospital Stay   NEUROLOGY IP CONSULT  NEUROLOGY IP CONSULT  PHYSICAL THERAPY ADULT IP CONSULT  OCCUPATIONAL THERAPY ADULT IP CONSULT  ADVANCE DIRECTIVE IP CONSULT    Code Status   Full Code    Time Spent on this Encounter   I, Jaquan Garces MD, personally saw the patient today and spent less than or equal to 30 minutes discharging this patient.       Jaquan Garces MD  [facility]  ______________________________________________________________________    Physical Exam   Vital Signs: Temp: 98.1  F (36.7  C) Temp src: Oral BP: 135/80   Heart Rate: 66 Resp: 14 SpO2: 97 % O2 Device: None (Room air)    Weight: 0 lbs 0 oz  Constitutional: alert, no distress and cooperative  Head  normocephalic   Neck: Neck supple.  No JVD  ENT: Pupils symmetrical, sclera anicteric,  Cardiac: RRR. No murmurs, clicks gallops or rub,   Respiratory: Breathing comfortably. Lungs clear  Gastrointestinal: Abdomen soft, non-tender. BS normal.  : no  Kennedi  Musculoskeletal: extremities normal- no gross deformities noted. No clubbing  Skin: no rashes, extremities edema: none.   Neurologic:oriented and appropriate, grossly non focal.       Primary Care Physician   Gunnar Sarkar    Discharge Orders   No discharge procedures on file.    Significant Results and Procedures   Most Recent 3 CBC's:  Recent Labs   Lab Test 12/30/19  1204 03/20/19  1352 07/08/18  1745   WBC 5.8 5.4 6.3   HGB 13.5 14.9 13.1    94 96    224 251     Most Recent 3 BMP's:  Recent Labs   Lab Test 12/31/19  0758 12/30/19  1204 03/20/19  1352    139 137   POTASSIUM 4.0 3.8 3.9   CHLORIDE 112* 105 98   CO2 28 30 29   BUN 6* 5* 19   CR 0.60 0.66 0.73   ANIONGAP 3 4 10   DENISA 8.5 8.8 9.1   GLC 96 128* 96     Most Recent 2 LFT's:  Recent Labs   Lab Test 12/30/19  1204 03/20/19  1352   AST 28 69*   ALT 19 45   ALKPHOS 62 109   BILITOTAL 0.6 1.5*     Most Recent 3 INR's:  Recent Labs   Lab Test 07/09/18  1013   INR 1.03     Most Recent INR's and Anticoagulation Dosing History:  Anticoagulation Dose History     Recent Dosing and Labs Latest Ref Rng & Units 7/9/2018    INR 0.86 - 1.14 1.03        Most Recent 3 Creatinines:  Recent Labs   Lab Test 12/31/19  0758 12/30/19  1204 03/20/19  1352   CR 0.60 0.66 0.73     Most Recent 3 Hemoglobins:  Recent Labs   Lab Test 12/30/19  1204 03/20/19  1352 07/08/18  1745   HGB 13.5 14.9 13.1     Most Recent 3 Troponin's:  Recent Labs   Lab Test 08/23/13  1145   TROPI <0.012     Most Recent 3 BNP's:No lab results found.  Most Recent D-dimer:No lab results found.  Most Recent Cholesterol Panel:No lab results found.  Most Recent 6 Bacteria Isolates From Any Culture (See EPIC Reports for Culture Details):No lab results found.  Most Recent TSH and T4:  Recent Labs   Lab Test 12/30/19  1204   TSH 1.23     Most Recent Hemoglobin A1c:No lab results found.  Most Recent 6 glucoses:  Recent Labs   Lab Test 12/31/19  0758 12/30/19  1204 03/20/19  1352  07/08/18  1745 08/23/13  1145 01/05/13  1910   GLC 96 128* 96 98 93 93     Most Recent Urinalysis:  Recent Labs   Lab Test 12/30/19  1307   COLOR Yellow   APPEARANCE Clear   URINEGLC Negative   URINEBILI Negative   URINEKETONE Negative   SG 1.004   UBLD Negative   URINEPH 7.0   PROTEIN Negative   NITRITE Negative   LEUKEST Negative   RBCU 0   WBCU 1     Most Recent ABG:No lab results found.  Most Recent ESR & CRP:No lab results found.  Most Recent Anemia Panel:  Recent Labs   Lab Test 12/31/19  0758 12/30/19  1204   WBC  --  5.8   HGB  --  13.5   HCT  --  41.9   MCV  --  100   PLT  --  337   B12  --  319   FOLIC 10.1  --      Most Recent CPK:No lab results found.,   Results for orders placed or performed during the hospital encounter of 12/30/19   Head CT w/o contrast    Narrative    CT SCAN OF THE HEAD WITHOUT CONTRAST December 30, 2019 12:28 PM     HISTORY: Right leg shaking, confusion/memory issue.    TECHNIQUE: Axial images of the head and coronal reformations without  IV contrast material. Radiation dose for this scan was reduced using  automated exposure control, adjustment of the mA and/or kV according  to patient size, or iterative reconstruction technique.    COMPARISON: Head CT 3/20/2019.    FINDINGS: There is no evidence of intracranial hemorrhage, mass, acute  infarct or anomaly. The ventricles are normal in size, shape and  configuration. The brain parenchyma and subarachnoid spaces are  normal.     The visualized portions of the sinuses and mastoids appear normal. The  bony calvarium and bones of the skull base appear intact.       Impression    IMPRESSION: No evidence of acute intracranial hemorrhage, mass, or  herniation.    LUCY BERNARD MD   MR Brain w/o & w Contrast    Narrative    MRI OF THE BRAIN WITHOUT AND WITH CONTRAST 12/31/2019 7:18 PM     COMPARISON: Head CT 12/30/2019.    HISTORY: Seizures, refractory, surgical candidate .    TECHNIQUE: Axial diffusion-weighted with ADC map, axial  T2-weighted  with fat saturation, axial T1-weighted, axial turboFLAIR and coronal  T1-weighted images of the brain were acquired without intravenous  contrast. Following intravenous administration of gadolinium (6.5  Gadavist), axial T1-weighted images of the brain were acquired.     FINDINGS: There is mild diffuse cerebral volume loss. There are a few  tiny scattered focal areas of abnormal T2 signal hyperintensity in the  cerebral white matter bilaterally that are consistent with sequela of  chronic small vessel ischemic disease.    The ventricles and basal cisterns are within normal limits in  configuration given the degree of cerebral volume loss. There is no  midline shift. There are no extra-axial fluid collections. There is no  evidence for stroke or acute intracranial hemorrhage. There is no  abnormal contrast enhancement in the brain or its coverings.    There is no sinusitis or mastoiditis.      Impression    IMPRESSION: Diffuse cerebral volume loss and cerebral white matter  changes consistent with chronic small vessel ischemic disease. No  evidence for acute intracranial pathology. No findings to suggest a  seizure focus.     AGATHA EDMONDS MD       EEG dated 12/31/2019  Description:  This is a digital EEG performed in the standard international 10-20 electrode system.  A normal posterior alpha rhythm is present at 8-9 Hz bilaterally with quiet wakefulness and eye closure.  The background activity during wakefulness is mostly in the Low alpha range and is symmetric.      Intermittent delta/theta activity is noted in bifrontal area.  With drowsiness, there is a dropout of the posterior dominant rhythm, and increased delta/theta frequency activity.  The patient did not fall asleep.       Photic stimulation and hyperventilation were performed, and no abnormalities occurred during the procedures.       No epileptiform activity or seizures were present.  The one-lead EKG was unremarkable.     Impression:  Intermittent focal slowing in the bifrontal area which could indicate cerebral dysfunction in this area otherwise No epileptiform activity or seizures were present.      Discharge Medications   Current Discharge Medication List      CONTINUE these medications which have NOT CHANGED    Details   lactobacillus rhamnosus, GG, (CULTURELL) capsule Take 1 capsule by mouth daily      levothyroxine (SYNTHROID) 50 MCG tablet Take 50 mcg by mouth daily       metoprolol succinate ER (TOPROL-XL) 50 MG 24 hr tablet Take 50 mg by mouth daily          STOP taking these medications       amphetamine-dextroamphetamine (ADDERALL) 10 MG tablet Comments:   Reason for Stopping:         clonazePAM (KLONOPIN) 1 MG tablet Comments:   Reason for Stopping:         venlafaxine (EFFEXOR-XR) 150 MG 24 hr capsule Comments:   Reason for Stopping:             Allergies   Allergies   Allergen Reactions     Morphine Itching     iv morphine after         Hydrocodone-Acetaminophen Itching     In higher doses    In higher doses     Morphine Hcl Itching

## 2023-01-05 NOTE — DISCHARGE INSTRUCTIONS
You do not have any clotting in your blood.  Please take your fluid pill as directed.  If your swelling continues to worsen with fluid pill you may use a double dose 1x per week as needed.  Use your previously prescribed medications to help with pain.    Mrs. Waters,    Thank you for letting me care for you today! It was nice meeting you, and I hope you feel better soon.   If you would like access to your chart and what was done today please utilize the [facility].   Please come back to [facility] for all of your future medical needs.    Our goal in the emergency department is to always give you outstanding care and exceptional service. You may receive a survey by mail or e-mail in the next week regarding your experience in our ED. We would greatly appreciate you completing and returning the survey. Your feedback provides us with a way to recognize our staff who give very good care and it helps us learn how to improve when your experience was below our aspiration of excellence.     Sincerely,    Yaniv Arguello MD  Board Certified Emergency Physician

## 2023-01-06 ENCOUNTER — PATIENT MESSAGE (OUTPATIENT)
Dept: PAIN MEDICINE | Facility: OTHER | Age: 78
End: 2023-01-06
Payer: MEDICARE

## 2023-01-06 ENCOUNTER — TELEPHONE (OUTPATIENT)
Dept: PAIN MEDICINE | Facility: OTHER | Age: 78
End: 2023-01-06
Payer: MEDICARE

## 2023-01-06 DIAGNOSIS — M54.15 RADICULOPATHY OF THORACOLUMBAR REGION: ICD-10-CM

## 2023-01-06 DIAGNOSIS — G89.4 CHRONIC PAIN SYNDROME: Primary | ICD-10-CM

## 2023-01-06 NOTE — TELEPHONE ENCOUNTER
----- Message from Jose L Méndez MD sent at 1/5/2023  4:46 PM CST -----  Regarding: RE: Request to hold Plavix  Okay to hold Plavix as needed       ----- Message -----  From: Anyi Elizondo LPN  Sent: 1/5/2023  12:58 PM CST  To: Jose L Méndez MD  Subject: Request to hold Plavix                           Good afternoon,    Patient will be scheduled for an SCS Trial with Dr. Pereira. Staff requesting to hold Plavix for 7 days prior to procedure plus 7 days for the duration of the trial for a total of 14 days.    -ALSO-      If the trial is successful can the patient stop Plavix for 7 days prior to implant?    Please advise.    Thank you,  Anyi

## 2023-01-07 NOTE — PROGRESS NOTES
"The patient location is: [facility]  The chief complaint leading to consultation is: f/u diabetes, specialist appointments, kidney stones    Visit type: audiovisual    Face to Face time with patient: 35  43 minutes of total time spent on the encounter, which includes face to face time and non-face to face time preparing to see the patient (eg, review of tests), Obtaining and/or reviewing separately obtained history, Documenting clinical information in the electronic or other health record, Independently interpreting results (not separately reported) and communicating results to the patient/family/caregiver, or Care coordination (not separately reported).         Each patient to whom he or she provides medical services by telemedicine is:  (1) informed of the relationship between the physician and patient and the respective role of any other health care provider with respect to management of the patient; and (2) notified that he or she may decline to receive medical services by telemedicine and may withdraw from such care at any time.    Notes:           [facility]    Subjective:      Patient ID: Indira Waters is a 77 y.o. female.    Chief Complaint: Diabetes    History was obtained from the patient and supplemented through chart review.  This pt is known to me.    HPI:    Patient is a 77 y.o. female with chronic medical problems including DM, HTN, HLD, CAD, fibromyalgia    An attempt was made in Spring of 2022 to get pt in with Dr. Lozoya who specializes in more complex patients.  Ms. Waters decided against this plan due to the tradition in that clinic to minimize medications, namely narcotic pain medication.      Hypotension after taking 1 nitro, SOB, feels like "my heart is going to stop"  Also took nitro last week  Advised pt go to ER, she declines.  Advised to go if she feels worse  Precautions given BP meds and hypotension "I know" states pt    2 kidney stones per pt, no " Problem: Discharge Planning  Goal: Discharge to home or other facility with appropriate resources  1/7/2023 0024 by Tiera Rizvi  Outcome: Progressing  Flowsheets (Taken 1/6/2023 2100)  Discharge to home or other facility with appropriate resources: Identify barriers to discharge with patient and caregiver  1/6/2023 2012 by Lyudmila Lacey RN  Outcome: Progressing  Flowsheets (Taken 1/6/2023 7453)  Discharge to home or other facility with appropriate resources: Identify barriers to discharge with patient and caregiver     Problem: Pain  Goal: Verbalizes/displays adequate comfort level or baseline comfort level  1/7/2023 0024 by JAZLYN Urban  Outcome: Progressing  Flowsheets (Taken 1/6/2023 2015)  Verbalizes/displays adequate comfort level or baseline comfort level: Encourage patient to monitor pain and request assistance  1/6/2023 2012 by Lyudmila Lacey RN  Outcome: Progressing  Flowsheets (Taken 1/6/2023 2012)  Verbalizes/displays adequate comfort level or baseline comfort level:   Encourage patient to monitor pain and request assistance   Assess pain using appropriate pain scale     Problem: Skin/Tissue Integrity  Goal: Absence of new skin breakdown  Description: 1. Monitor for areas of redness and/or skin breakdown  2. Assess vascular access sites hourly  3. Every 4-6 hours minimum:  Change oxygen saturation probe site  4. Every 4-6 hours:  If on nasal continuous positive airway pressure, respiratory therapy assess nares and determine need for appliance change or resting period.   1/7/2023 0024 by Tiera Rizvi  Outcome: Progressing  1/6/2023 2012 by Lyudmila Lacey RN  Outcome: Progressing     Problem: Safety - Adult  Goal: Free from fall injury  1/6/2023 2012 by Lyudmila Lacey RN  Outcome: Francia Brooks (Taken 1/6/2023 2012)  Free From Fall Injury: Instruct family/caregiver on patient safety     Problem: ABCDS Injury Assessment  Goal: Absence of physical injury  1/6/2023 2012 by Laverta Brittle "nephrolithiasis on CT renal scan June  Seen by urology July 2022, possible urethral stricture dilated 7/2022, advised urogyn if obstructive symptoms persist  Pt "states she knows her body"  No further hemoptysis  Sometimes with jaw  Does not want to go to ER, does not want admission  Referred to urogyn, staff will call pt and inquire if she's willing    Uncontrolled glucoses  229 "good" she states; aware of goal  Advised elevated glucose killing her, "I know.   Declined dexcom because she doesn't want something attached to her    Pt chooses to minimize specialists and sometimes changes her medical regimen without consulting others.  She has particular ideas of how things should unfold    Fall and ER visit weeks ago  Spit up blood clots this week Monday without coughing, vomiting  No nose bleed, no coughin  Dentures sometimes fit poorly, was on plavix and was temp stopped  However upon discussing risks and benefits, pt and daughter would rather risk another similar occasion happening rather than going off the plavix for now.  ENT referral and recommended pt see a dentist for denture sizing/ evaluation    DM  Uncontrolled  11.6 4/14/2022 -> 10.5 -> 11.2 9/30/2022  Difficulty with compliance; not motivated, attempted to encourage with motivational interviewing  levemir 20 BID  Novolog 12 TID  Still with nausea  Declines dexcom    Severely deconditioned  Exercise encouraged    CAD  Followed by Dr. Méndez  Cardiac Cath 5/6/2022 with significant blockages   Taking ASA 81, plavix  Not taking statin due to vasculitis thought to be 2/2 statin; repatha started    Normocytic Anemia  Family history of colon cancer  Need iron studies, B12, folate  Did not follow up heme onc as requested  Overdue for colonoscopy due to famiy hx of colon cancer; ordered 6/2021, attempt again    Fibromyalgia   Chronic Pain Syndrome  Potential plan for surgical implantation of spinal cord stimulator with Dr. Covington if can get A1C < 10  Roariane, " Abe Gonzalez RN  Outcome: Progressing norco 10    Lupus?  Not treated in the past    COPD/asthma?  combivent  Stable, breathing better with lasix    Osteoporosis  Did not tolerate bisphosphonate in the past  AE dicussed, Rx prolia  Advised on r/b, pt choses to pursue    HTN  Hypotensino today  Procardia 30 BID, losartan 50 mg daily, clonidine 0.1 mg BID, toprol 50 mg daily  F/u BP w/ 2 week nurse visit    CHF   Grade II DD  Lasix 40 mg daily  Doing well    Drug induced Vasculitis  Thought to be 2/2 to atorvastatin, switched to repatha q 14 days  Was discontinued in April for unknown reason.  Reordering and sent pt portal message    CKD Stage 3b  Nephrology referral #5 placed today, encourage pt again to make appt.   Avoid nsaids, nephrotoxic meds    Also with constipation    Not addressed  Depression  zoloft 100 mg daily   generally depressed.    History of cervical cancer  S/p hyst with 1 ovary remaining  Appt with gyn canceled and not rescheduled in the past      Medical History  Past Medical History:   Diagnosis Date    Arthritis     Back pain     Cancer     ovarian    Depression     Diabetes mellitus     Fibromyalgia     Hyperlipidemia     Hypertension     Lupus     Stroke     slight left sided weakness       Review of Systems   Constitutional:  Negative for activity change and unexpected weight change.   HENT:  Positive for trouble swallowing. Negative for hearing loss and rhinorrhea.    Eyes:  Negative for discharge and visual disturbance.   Respiratory:  Negative for chest tightness and wheezing.    Cardiovascular:  Negative for chest pain and palpitations.   Gastrointestinal:  Negative for blood in stool, constipation, diarrhea and vomiting.   Endocrine: Negative for polydipsia and polyuria.   Genitourinary:  Negative for difficulty urinating, dysuria, hematuria and menstrual problem.   Musculoskeletal:  Positive for arthralgias, joint swelling and neck pain.   Neurological:  Negative for weakness and headaches.   Psychiatric/Behavioral:   Negative for confusion and dysphoric mood.        Surgical hx, family hx, social hx   Have been reviewed      Current Outpatient Medications:     acetaminophen (TYLENOL) 500 MG tablet, Take 2 tablets (1,000 mg total) by mouth every 8 (eight) hours as needed for Pain., Disp: , Rfl: 0    allopurinoL (ZYLOPRIM) 300 MG tablet, Take 1 tablet (300 mg total) by mouth once daily., Disp: 90 tablet, Rfl: 1    aspirin (ECOTRIN) 81 MG EC tablet, Take 1 tablet (81 mg total) by mouth once daily., Disp: 30 tablet, Rfl: 0    blood sugar diagnostic Strp, 1 each by Misc.(Non-Drug; Combo Route) route 4 (four) times daily., Disp: 200 each, Rfl: 0    blood-glucose meter Misc, Follow package directions (Patient taking differently: Follow package directions), Disp: 1 each, Rfl: 0    blood-glucose meter,continuous (DEXCOM G6 ) Misc, 1 each by Misc.(Non-Drug; Combo Route) route continuous prn., Disp: 1 each, Rfl: PRN    blood-glucose sensor (DEXCOM G6 SENSOR) Nicole, 3 each by Misc.(Non-Drug; Combo Route) route continuous prn. Change every 10 days., Disp: 3 each, Rfl: PRN    blood-glucose transmitter (DEXCOM G6 TRANSMITTER) Nicole, 1 each by Misc.(Non-Drug; Combo Route) route continuous prn., Disp: 1 each, Rfl: PRN    cloNIDine (CATAPRES) 0.1 MG tablet, Take 1 tablet (0.1 mg total) by mouth 2 (two) times daily., Disp: 180 tablet, Rfl: 0    clopidogreL (PLAVIX) 75 mg tablet, Take 1 tablet (75 mg total) by mouth once daily., Disp: 30 tablet, Rfl: 3    evolocumab (REPATHA SURECLICK) 140 mg/mL PnIj, Inject 1 mL (140 mg total) into the skin every 14 (fourteen) days., Disp: 8 each, Rfl: 3    fenofibrate micronized (LOFIBRA) 134 MG Cap, Take 1 capsule (134 mg total) by mouth daily with breakfast., Disp: 90 capsule, Rfl: 3    furosemide (LASIX) 40 MG tablet, Take 1 tablet (40 mg total) by mouth once daily., Disp: 30 tablet, Rfl: 11    HYDROcodone-acetaminophen (NORCO)  mg per tablet, Take 1 tablet by mouth every 8 (eight) hours as needed  "for Pain., Disp: 90 tablet, Rfl: 0    insulin aspart U-100 (NOVOLOG) 100 unit/mL (3 mL) InPn pen, Inject 12 Units into the skin 3 (three) times daily with meals. Plus correction scale. Max TDD 40units., Disp: 15 mL, Rfl: 3    insulin detemir U-100 (LEVEMIR FLEXTOUCH U-100 INSULN) 100 unit/mL (3 mL) InPn pen, Inject 20 Units into the skin 2 (two) times daily., Disp: 15 mL, Rfl: 3    ipratropium-albuteroL (COMBIVENT)  mcg/actuation inhaler, Inhale 1 puff into the lungs by mouth every 6 (six) hours., Disp: 4 g, Rfl: 0    losartan (COZAAR) 50 MG tablet, Take 1 tablet (50 mg total) by mouth once daily., Disp: 90 tablet, Rfl: 0    methocarbamoL (ROBAXIN) 500 MG Tab, Take 1 tablet (500 mg total) by mouth 3 (three) times daily as needed (muscle spasm)., Disp: 90 tablet, Rfl: 2    metoclopramide HCl (REGLAN) 5 MG tablet, Take 1 tablet (5 mg total) by mouth 4 (four) times daily as needed (nausea, prevention)., Disp: 30 tablet, Rfl: 3    metoprolol succinate (TOPROL-XL) 100 MG 24 hr tablet, Take 1 tablet (100 mg total) by mouth once daily., Disp: 30 tablet, Rfl: 1    NIFEdipine (PROCARDIA-XL) 30 MG (OSM) 24 hr tablet, Take 1 tablet (30 mg total) by mouth 2 (two) times a day., Disp: 180 tablet, Rfl: 0    nitroGLYCERIN (NITROSTAT) 0.4 MG SL tablet, Place 1 tablet (0.4 mg total) under the tongue every 5 (five) minutes as needed for Chest pain., Disp: 25 tablet, Rfl: 5    pantoprazole (PROTONIX) 40 MG tablet, Take 1 tablet (40 mg total) by mouth once daily., Disp: 30 tablet, Rfl: 0    pen needle, diabetic (BD ULTRA-FINE HIEN PEN NEEDLE) 32 gauge x 5/32" Ndle, 1 each by Misc.(Non-Drug; Combo Route) route 4 (four) times daily., Disp: 400 each, Rfl: 3    pregabalin (LYRICA) 150 MG capsule, Take 1 capsule (150 mg total) by mouth 3 (three) times daily., Disp: 90 capsule, Rfl: 2    senna-docusate 8.6-50 mg (PERICOLACE) 8.6-50 mg per tablet, Take 1 tablet by mouth 2 (two) times daily as needed for Constipation., Disp: 60 tablet, " Rfl: 0    sertraline (ZOLOFT) 100 MG tablet, Take 1 tablet (100 mg total) by mouth once daily., Disp: 90 tablet, Rfl: 3    teriparatide 20 mcg/dose (620mcg/2.48mL) PnIj, Inject 20 mcg into the skin once daily., Disp: 2.48 mL, Rfl: 11    triamcinolone acetonide 0.1% (KENALOG) 0.1 % cream, Apply to affected areas of body twice daily as needed rash. Do not use on face, underarms, or groin., Disp: 454 g, Rfl: 0    TRUEPLUS LANCETS 30 gauge Misc, TEST AS DIRECTED FOUR TIMES DAILY, Disp: 200 each, Rfl: 2    Objective:        There is no height or weight on file to calculate BMI.  Vitals:    11/01/22 1304   BP: (!) 104/52         Physical Exam  Vitals and nursing note reviewed.   Constitutional:       General: She is not in acute distress.     Appearance: Normal appearance. She is not ill-appearing.   HENT:      Head: Normocephalic and atraumatic.   Eyes:      General: No scleral icterus.  Pulmonary:      Effort: Pulmonary effort is normal.   Musculoskeletal:         General: No deformity.   Neurological:      Mental Status: She is alert and oriented to person, place, and time.   Psychiatric:         Behavior: Behavior normal.           Lab Results   Component Value Date    WBC 10.85 09/30/2022    HGB 10.6 (L) 09/30/2022    HCT 33.4 (L) 09/30/2022     09/30/2022    CHOL 171 09/30/2022    TRIG 210 (H) 09/30/2022    HDL 38 (L) 09/30/2022    ALT 17 09/30/2022    AST 20 09/30/2022     09/30/2022    K 4.5 09/30/2022     09/30/2022    CREATININE 1.4 09/30/2022    BUN 16 09/30/2022    CO2 22 (L) 09/30/2022    TSH 2.432 09/30/2022    INR 1.0 05/05/2022    HGBA1C 11.2 (H) 09/30/2022       The 10-year ASCVD risk score (Annamarie WEATHERS, et al., 2019) is: 27.9%    Values used to calculate the score:      Age: 77 years      Sex: Female      Is Non- : No      Diabetic: Yes      Tobacco smoker: No      Systolic Blood Pressure: 104 mmHg      Is BP treated: Yes      HDL Cholesterol: 38 mg/dL      Total  Cholesterol: 171 mg/dL    Repatha    (Imaging have been independently reviewed)      Assessment:         1. Noncompliance with medication regimen    2. Type 2 diabetes mellitus with hyperglycemia, with long-term current use of insulin    3. Essential hypertension    4. 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 pectoris    5. Pelvic pain    6. Anemia, unspecified type    7. Family history of colon cancer    8. Stage 4 chronic kidney disease    9. Stage 3b chronic kidney disease              Plan:     Indira was seen today for diabetes.    Diagnoses and all orders for this visit:    Noncompliance with medication regimen  -     Ambulatory referral/consult to Nephrology; Future    Type 2 diabetes mellitus with hyperglycemia, with long-term current use of insulin  -     Ambulatory referral/consult to Nephrology; Future    Essential hypertension    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 pectoris    Pelvic pain  -     Ambulatory referral/consult to Urogynecology; Future    Anemia, unspecified type  -     Ambulatory referral/consult to Endo Procedure ; Future    Family history of colon cancer  -     Ambulatory referral/consult to Endo Procedure ; Future    Stage 4 chronic kidney disease  -     Ambulatory referral/consult to Nephrology; Future    Stage 3b chronic kidney disease    Advised pt go to ER/admission due to hypotension, chest pain, abdominal pain  Pr declines    Will f/u on BP tomorrow with nurse remote visit  Precautions given    Follow up in about 3 months (around 2/1/2023). or sooner prn        58 min were used in chart review, evaluation and counseling of patient re: diabetes, f/u kidney stones and risks, documentation and on same day of service     All medications were reviewed including potential side effects and risks/benefits.  Pt was counseled to call back if anything worsens or if questions arise.    Chun Zapata,  MD  Family Medicine  [facility]  [address]  [address]  [place] [address]  Phone [phone]  Fax [phone]

## 2023-01-10 ENCOUNTER — TELEPHONE (OUTPATIENT)
Dept: OPTOMETRY | Facility: CLINIC | Age: 78
End: 2023-01-10
Payer: MEDICARE

## 2023-01-10 ENCOUNTER — OFFICE VISIT (OUTPATIENT)
Dept: OPTOMETRY | Facility: CLINIC | Age: 78
End: 2023-01-10
Payer: MEDICARE

## 2023-01-10 DIAGNOSIS — H52.203 HYPEROPIA WITH ASTIGMATISM AND PRESBYOPIA, BILATERAL: ICD-10-CM

## 2023-01-10 DIAGNOSIS — H25.13 SENILE NUCLEAR SCLEROSIS, BILATERAL: ICD-10-CM

## 2023-01-10 DIAGNOSIS — H04.123 DRY EYE SYNDROME OF BOTH EYES: ICD-10-CM

## 2023-01-10 DIAGNOSIS — H52.4 HYPEROPIA WITH ASTIGMATISM AND PRESBYOPIA, BILATERAL: ICD-10-CM

## 2023-01-10 DIAGNOSIS — E11.36 TYPE 2 DIABETES MELLITUS WITH CATARACT: ICD-10-CM

## 2023-01-10 DIAGNOSIS — H52.03 HYPEROPIA WITH ASTIGMATISM AND PRESBYOPIA, BILATERAL: ICD-10-CM

## 2023-01-10 DIAGNOSIS — E11.9 TYPE 2 DIABETES MELLITUS WITHOUT RETINOPATHY: ICD-10-CM

## 2023-01-10 DIAGNOSIS — E11.59 TYPE 2 DIABETES MELLITUS WITH OTHER CIRCULATORY COMPLICATION, WITHOUT LONG-TERM CURRENT USE OF INSULIN: Primary | ICD-10-CM

## 2023-01-10 PROCEDURE — 92014 COMPRE OPH EXAM EST PT 1/>: CPT | Mod: S$PBB,,, | Performed by: OPTOMETRIST

## 2023-01-10 PROCEDURE — 99999 PR PBB SHADOW E&M-EST. PATIENT-LVL IV: CPT | Mod: PBBFAC,,, | Performed by: OPTOMETRIST

## 2023-01-10 PROCEDURE — 92015 PR REFRACTION: ICD-10-PCS | Mod: ,,, | Performed by: OPTOMETRIST

## 2023-01-10 PROCEDURE — 99214 OFFICE O/P EST MOD 30 MIN: CPT | Mod: PBBFAC | Performed by: OPTOMETRIST

## 2023-01-10 PROCEDURE — 92014 PR EYE EXAM, EST PATIENT,COMPREHESV: ICD-10-PCS | Mod: S$PBB,,, | Performed by: OPTOMETRIST

## 2023-01-10 PROCEDURE — 99999 PR PBB SHADOW E&M-EST. PATIENT-LVL IV: ICD-10-PCS | Mod: PBBFAC,,, | Performed by: OPTOMETRIST

## 2023-01-10 PROCEDURE — 92015 DETERMINE REFRACTIVE STATE: CPT | Mod: ,,, | Performed by: OPTOMETRIST

## 2023-01-10 RX ORDER — HYDROCORTISONE 25 MG/G
CREAM TOPICAL
COMMUNITY

## 2023-01-10 RX ORDER — SULFAMETHOXAZOLE AND TRIMETHOPRIM 800; 160 MG/1; MG/1
TABLET ORAL
COMMUNITY
End: 2023-10-26

## 2023-01-10 RX ORDER — ATORVASTATIN CALCIUM 40 MG/1
TABLET, FILM COATED ORAL
COMMUNITY
End: 2023-05-19

## 2023-01-11 ENCOUNTER — TELEPHONE (OUTPATIENT)
Dept: PAIN MEDICINE | Facility: CLINIC | Age: 78
End: 2023-01-11
Payer: MEDICARE

## 2023-01-11 NOTE — PROGRESS NOTES
EDI    MARIBEL: [birth year]  Chief complaint (CC): Patient is here for annual eye exam today.  Patient   is here with daughter today.  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  Patient has prescription glasses that   are 10 year old and are uncomfortable.  Patient has noticed that distance   and near vision has gradually gotten worse over.    Glasses? +10 yrs. old  Contacts? -  H/o eye surgery, injections or laser: -  H/o eye injury: -  Known eye conditions? See above  Family h/o eye conditions? -  Eye gtts? -      (-) Flashes (-)  Floaters (-) Mucous   (-)  Tearing (-) Itching (-) Burning   (-) Headaches (-) Eye Pain/discomfort (-) Irritation   (-)  Redness (-) Double vision (-) Blurry vision    Diabetic? + usually 170-350 BS is not under control  A1c? Hemoglobin A1C       Date                     Value               Ref Range             Status                12/05/2022               10.2 (H)            4.0 - 5.6 %           Final                 09/30/2022               11.2 (H)            4.0 - 5.6 %           Final                 07/14/2022               10.5 (H)            4.0 - 5.6 %           Final                    Last edited by Marifer Prescott on 1/10/2023  3:34 PM.            Assessment /Plan     For exam results, see Encounter Report.      Type 2 diabetes mellitus with other circulatory complication, without long-term current use of insulin  Type 2 diabetes mellitus without retinopathy  BS control. No signs of diabetic retinopathy. Monitor with annual exam.    Dry eye syndrome of both eyes  Recommend Systane Ultra or Refresh Optive BID-TID OU to aid with symptoms of dry eyes.    Type 2 diabetes mellitus with cataract  -     Ambulatory referral/consult to Ophthalmology; Future; Expected date: 01/17/2023  Senile nuclear sclerosis, bilateral  -     Ambulatory referral/consult to Ophthalmology; Future; Expected date: 01/17/2023  Visually significant. Educated pt on  potential for surgery. Referral to Dr Heller.     Hyperopia with astigmatism and presbyopia, bilateral  SRx released to patient. Patient educated on lens options. Normal ocular health. RTC 1 year for routine exam.

## 2023-01-11 NOTE — TELEPHONE ENCOUNTER
----- Message from Joel Soliman sent at 1/11/2023 10:07 AM CST -----  Regarding: REFILL  Who Called:CAYLA GOODEN [[id number]]          RX Name and Strength:HYDROcodone-acetaminophen (NORCO)  mg per tablet            Is this a 30 day or 90 day RX: 30          Preferred Pharmacy with phone number:  [facility]           Local or Mail Order: LOCAL                       Would the patient rather a call back or a response via [facility]?    Best Call Back Number:      Additional Information:

## 2023-01-11 NOTE — TELEPHONE ENCOUNTER
----- Message from Joel Soliman sent at 1/11/2023 10:07 AM CST -----  Regarding: REFILL  Who Called:CAYLA GOODEN [[id number]]          RX Name and Strength:HYDROcodone-acetaminophen (NORCO)  mg per tablet            Is this a 30 day or 90 day RX: 30          Preferred Pharmacy with phone number:  [facility]           Local or Mail Order: LOCAL                       Would the patient rather a call back or a response via [facility]?    Best Call Back Number:      Additional Information:

## 2023-01-12 DIAGNOSIS — G89.4 CHRONIC PAIN DISORDER: ICD-10-CM

## 2023-01-12 DIAGNOSIS — G89.4 CHRONIC PAIN SYNDROME: ICD-10-CM

## 2023-01-12 DIAGNOSIS — E08.42 DIABETIC POLYNEUROPATHY ASSOCIATED WITH DIABETES MELLITUS DUE TO UNDERLYING CONDITION: ICD-10-CM

## 2023-01-12 NOTE — TELEPHONE ENCOUNTER
----- Message from Clarita Espinosa sent at 1/12/2023 11:20 AM CST -----  Can the clinic reply in [facility]:              Please refill the medication(s) listed below. Please call the patient when the prescription(s) is ready for  at this phone number [phone]                           Medication #1HYDROcodone-acetaminophen (NORCO)  mg per tablet            Medication #2              Preferred Pharmacy:[facility]

## 2023-01-12 NOTE — TELEPHONE ENCOUNTER
Patient requesting refill on norco 10 mg   Last office visit 12/12/22   shows last refill on 12/12/22  Patient does have a pain contract on file with [facility]  Patient last UDS 2/14/22 was consistent with current therapy     NOROXYMORPHONE  Not Detected     HYDROCODONE  Present     NORHYDROCODONE  Present     HYDROMORPHONE  Present

## 2023-01-13 ENCOUNTER — SPECIALTY PHARMACY (OUTPATIENT)
Dept: PHARMACY | Facility: CLINIC | Age: 78
End: 2023-01-13
Payer: MEDICARE

## 2023-01-13 RX ORDER — HYDROCODONE BITARTRATE AND ACETAMINOPHEN 10; 325 MG/1; MG/1
1 TABLET ORAL EVERY 8 HOURS PRN
Qty: 90 TABLET | Refills: 0 | Status: SHIPPED | OUTPATIENT
Start: 2023-01-13 | End: 2023-02-10 | Stop reason: SDUPTHER

## 2023-01-13 NOTE — TELEPHONE ENCOUNTER
Specialty Pharmacy - Refill Coordination    Specialty Medication Orders Linked to Encounter      Flowsheet Row Most Recent Value   Medication #1 evolocumab (REPATHA SURECLICK) 140 mg/mL PnIj (Order#[id number], Rx#[id number])            Refill Questions - Documented Responses      Flowsheet Row Most Recent Value   Patient Availability and HIPAA Verification    Does patient want to proceed with activity? Yes   HIPAA/medical authority confirmed? Yes   Relationship to patient of person spoken to? Child   Refill Screening Questions    Would patient like to speak to a pharmacist? No   When does the patient need to receive the medication? 01/20/23   Refill Delivery Questions    How will the patient receive the medication? MED[facility]   When does the patient need to receive the medication? 01/20/23   Shipping Address Home   Address in [facility] confirmed and updated if neccessary? Yes   Expected Copay ($) 4.3   Is the patient able to afford the medication copay? Yes   Payment Method CC on file   Days supply of Refill 28   Supplies needed? No supplies needed   Refill activity completed? Yes   Refill activity plan Refill scheduled   Shipment/Pickup Date: 01/18/23            Current Outpatient Medications   Medication Sig    acetaminophen (TYLENOL) 500 MG tablet Take 2 tablets (1,000 mg total) by mouth every 8 (eight) hours as needed for Pain.    allopurinoL (ZYLOPRIM) 300 MG tablet Take 1 tablet (300 mg total) by mouth once daily.    aspirin (ECOTRIN) 81 MG EC tablet Take 1 tablet (81 mg total) by mouth once daily.    atorvastatin (LIPITOR) 40 MG tablet atorvastatin 40 mg tablet    blood sugar diagnostic Strp 1 each by Misc.(Non-Drug; Combo Route) route 4 (four) times daily.    blood-glucose meter Misc Follow package directions (Patient taking differently: Follow package directions)    blood-glucose meter,continuous (DEXCOM G6 ) Misc 1 each by Misc.(Non-Drug; Combo Route) route continuous prn.    blood-glucose  sensor (DEXCOM G6 SENSOR) Nicole 3 each by Misc.(Non-Drug; Combo Route) route continuous prn. Change every 10 days.    blood-glucose transmitter (DEXCOM G6 TRANSMITTER) Nicole 1 each by Misc.(Non-Drug; Combo Route) route continuous prn.    cloNIDine (CATAPRES) 0.1 MG tablet Take 1 tablet (0.1 mg total) by mouth 2 (two) times daily.    clopidogreL (PLAVIX) 75 mg tablet Take 1 tablet (75 mg total) by mouth once daily.    evolocumab (REPATHA SURECLICK) 140 mg/mL PnIj Inject 1 mL (140 mg total) into the skin every 14 (fourteen) days.    fenofibrate micronized (LOFIBRA) 134 MG Cap Take 1 capsule (134 mg total) by mouth daily with breakfast.    furosemide (LASIX) 40 MG tablet Take 1 tablet (40 mg total) by mouth once daily.    HYDROcodone-acetaminophen (NORCO)  mg per tablet Take 1 tablet by mouth every 8 (eight) hours as needed for Pain.    hydrocortisone (ANUSOL-HC) 2.5 % rectal cream Procto-Med HC 2.5 % topical cream perineal applicator    insulin aspart U-100 (NOVOLOG) 100 unit/mL (3 mL) InPn pen Inject 12 Units into the skin 3 (three) times daily with meals. Plus correction scale. Max TDD 40units.    insulin detemir U-100 (LEVEMIR FLEXTOUCH U-100 INSULN) 100 unit/mL (3 mL) InPn pen Inject 20 Units into the skin 2 (two) times daily.    ipratropium-albuteroL (COMBIVENT)  mcg/actuation inhaler Inhale 1 puff into the lungs by mouth every 6 (six) hours.    losartan (COZAAR) 50 MG tablet Take 1 tablet (50 mg total) by mouth once daily.    methocarbamoL (ROBAXIN) 500 MG Tab Take 1 tablet (500 mg total) by mouth 3 (three) times daily as needed (muscle spasm).    metoclopramide HCl (REGLAN) 5 MG tablet Take 1 tablet (5 mg total) by mouth 4 (four) times daily as needed (nausea, prevention).    metoprolol succinate (TOPROL-XL) 100 MG 24 hr tablet Take 1 tablet (100 mg total) by mouth once daily.    NIFEdipine (PROCARDIA-XL) 30 MG (OSM) 24 hr tablet Take 1 tablet (30 mg total) by mouth 2 (two) times a day.     "nitroGLYCERIN (NITROSTAT) 0.4 MG SL tablet Place 1 tablet (0.4 mg total) under the tongue every 5 (five) minutes as needed for Chest pain.    pantoprazole (PROTONIX) 40 MG tablet Take 1 tablet (40 mg total) by mouth once daily.    pen needle, diabetic (BD ULTRA-FINE HIEN PEN NEEDLE) 32 gauge x 5/32" Ndle 1 each by Misc.(Non-Drug; Combo Route) route 4 (four) times daily.    pregabalin (LYRICA) 150 MG capsule Take 1 capsule (150 mg total) by mouth 3 (three) times daily.    senna-docusate 8.6-50 mg (PERICOLACE) 8.6-50 mg per tablet Take 1 tablet by mouth 2 (two) times daily as needed for Constipation.    sertraline (ZOLOFT) 100 MG tablet Take 1 tablet (100 mg total) by mouth once daily.    sulfamethoxazole-trimethoprim 800-160mg (BACTRIM DS) 800-160 mg Tab sulfamethoxazole 800 mg-trimethoprim 160 mg tablet    teriparatide 20 mcg/dose (620mcg/2.48mL) PnIj Inject 20 mcg into the skin once daily.    triamcinolone acetonide 0.1% (KENALOG) 0.1 % cream Apply to affected areas of body twice daily as needed rash. Do not use on face, underarms, or groin.    TRUEPLUS LANCETS 30 gauge Misc TEST AS DIRECTED FOUR TIMES DAILY   Last reviewed on 1/11/2023  4:59 PM by Judie Morales, ANN    Review of patient's allergies indicates:   Allergen Reactions    Bleach (sodium hypochlorite) Shortness Of Breath    Nitrofurantoin macrocrystalline Anaphylaxis    Lipitor [atorvastatin] Diarrhea and Rash    Nsaids (non-steroidal anti-inflammatory drug)     Pcn [penicillins]     Toradol [ketorolac]     Last reviewed on  1/11/2023 4:59 PM by Judie Morales      Tasks added this encounter   2/10/2023 - Refill Call (Auto Added)   Tasks due within next 3 months   No tasks due.     July Almaraz [facility]  [address] [address]  Phone: [phone]  Fax: [phone]        "

## 2023-01-13 NOTE — TELEPHONE ENCOUNTER
----- Message from Estelle Lane sent at 1/13/2023 11:10 AM CST -----  Regarding: Patient call back  Who called: CAYLA GOODEN [[phone]]    What is the request in detail: Patient is requesting a call back. She would like to f/u on her 1HYDROcodone-acetaminophen (NORCO)  mg per tablet refill request      Please advise.    Can the clinic reply by [facility]? No    Best call back number: [phone]    Additional Information: N/A

## 2023-01-26 ENCOUNTER — PATIENT MESSAGE (OUTPATIENT)
Dept: PHARMACY | Facility: CLINIC | Age: 78
End: 2023-01-26
Payer: MEDICARE

## 2023-01-26 ENCOUNTER — OFFICE VISIT (OUTPATIENT)
Dept: UROGYNECOLOGY | Facility: CLINIC | Age: 78
End: 2023-01-26
Payer: MEDICARE

## 2023-01-26 VITALS
WEIGHT: 179 LBS | HEIGHT: 61 IN | BODY MASS INDEX: 33.79 KG/M2 | SYSTOLIC BLOOD PRESSURE: 124 MMHG | DIASTOLIC BLOOD PRESSURE: 80 MMHG

## 2023-01-26 DIAGNOSIS — N81.6 RECTOCELE: ICD-10-CM

## 2023-01-26 DIAGNOSIS — M79.18 MYOFASCIAL PAIN: ICD-10-CM

## 2023-01-26 DIAGNOSIS — N81.11 CYSTOCELE, MIDLINE: ICD-10-CM

## 2023-01-26 DIAGNOSIS — R39.15 URGENCY OF URINATION: ICD-10-CM

## 2023-01-26 DIAGNOSIS — N39.41 URGE INCONTINENCE: Primary | ICD-10-CM

## 2023-01-26 DIAGNOSIS — N39.3 STRESS INCONTINENCE: ICD-10-CM

## 2023-01-26 PROCEDURE — 99999 PR PBB SHADOW E&M-EST. PATIENT-LVL V: ICD-10-PCS | Mod: PBBFAC,,, | Performed by: OBSTETRICS & GYNECOLOGY

## 2023-01-26 PROCEDURE — 87086 URINE CULTURE/COLONY COUNT: CPT | Performed by: OBSTETRICS & GYNECOLOGY

## 2023-01-26 PROCEDURE — 99204 PR OFFICE/OUTPT VISIT, NEW, LEVL IV, 45-59 MIN: ICD-10-PCS | Mod: S$PBB,,, | Performed by: OBSTETRICS & GYNECOLOGY

## 2023-01-26 PROCEDURE — 99999 PR PBB SHADOW E&M-EST. PATIENT-LVL V: CPT | Mod: PBBFAC,,, | Performed by: OBSTETRICS & GYNECOLOGY

## 2023-01-26 PROCEDURE — 99215 OFFICE O/P EST HI 40 MIN: CPT | Mod: PBBFAC | Performed by: OBSTETRICS & GYNECOLOGY

## 2023-01-26 PROCEDURE — 99204 OFFICE O/P NEW MOD 45 MIN: CPT | Mod: S$PBB,,, | Performed by: OBSTETRICS & GYNECOLOGY

## 2023-01-26 NOTE — PROGRESS NOTES
"Chief Complaint   Patient presents with    Pelvic Pain    Urinary Incontinence    Constipation        HPI: Patient is a 77 y.o. female  presents today with c/o vaginal pressure and discomfort. She denies a vaginal bulge/ prolapse. She states that the symptoms have been occurring for a "long time", for longer than 10 years. She reports that she had a "bladder lift/ tack" procedure at least twice. She states that the surgery was due to prolapse and both surgeries were performed in the .     She reports bilateral hip pain and vaginal pain. She reports that the vaginal pain occurs when she has urinary urgency and needs to run to the bathroom. States that she had a fall in the middle of last year. She reports that since the fall her urinary urgency got worse. She states that she used to be able to squeeze the muscles but no longer feels like she is able to do so. Patient takes lasix and will void every hour. She wakes 2-3 times per night to void. She wears two pads at night and finds that they are both wet when she wakes. She reports urgency incontinence. Also notices leakage with a cough, sneeze or laugh.     She reports trying to do kegel exercises  but she has not tried any other treatments.   Patient drinks 6-7 bottles of water day. She also drinks 1-2 cups of coffee per day. She infrequently drinks alcohol.     She denies accidental bowel leakage. Only experienced one episode. Has constipation intermittently and takes a fiber supplement or MOM.       Review of Systems   Constitutional:  Negative for activity change, appetite change, chills, fatigue, fever and unexpected weight change.   HENT:  Positive for nasal congestion, hearing loss, mouth dryness and sore throat. Negative for dental problem.    Eyes:  Positive for pain and eye dryness.   Respiratory:  Negative for cough, shortness of breath and wheezing.    Cardiovascular:  Positive for chest pain, palpitations and leg swelling.   Gastrointestinal: "  Positive for abdominal pain, nausea and rectal pain. Negative for abdominal distention, blood in stool and constipation.   Endocrine: Negative for cold intolerance and heat intolerance.   Genitourinary:  Negative for dyspareunia, hot flashes and vaginal dryness.   Musculoskeletal:  Positive for arthralgias, back pain, joint swelling, myalgias, neck pain and neck stiffness. Negative for gait problem.   Integumentary:  Negative for rash.   Neurological:  Positive for dizziness, speech difficulty, weakness, light-headedness, numbness and headaches. Negative for tremors and seizures.   Psychiatric/Behavioral:  Negative for confusion, dysphoric mood and sleep disturbance. The patient is not nervous/anxious.       Past Medical History:   Diagnosis Date    Arthritis     Back pain     Cancer     ovarian    Cervical cancer     Depression     Diabetes mellitus     Fibromyalgia     Heart attack     Hyperlipidemia     Hypertension     Lupus     Stroke     slight left sided weakness       Past Surgical History:   Procedure Laterality Date    APPENDECTOMY       SECTION      2    CORONARY ANGIOGRAPHY N/A 2022    Procedure: ANGIOGRAM, CORONARY ARTERY;  Surgeon: Jose L Méndez MD;  Location: [facility] CATH LAB;  Service: Cardiology;  Laterality: N/A;    HYSTERECTOMY      with USO for cervical cancer    INJECTION OF ANESTHETIC AGENT AROUND NERVE Bilateral 2021    Procedure: BLOCK, NERVE, SYMPATHIC;  Surgeon: Holden Pereira MD;  Location: [facility] PAIN MGT;  Service: Pain Management;  Laterality: Bilateral;    INJECTION OF ANESTHETIC AGENT AROUND NERVE N/A 2021    Procedure: BLOCK, NERVE, SYMPATHETIC  need consent;  Surgeon: Holden Pereira MD;  Location: [facility] PAIN MGT;  Service: Pain Management;  Laterality: N/A;    YARED LINK,SWALLOW FUNCTION,CINE/VIDEO RECORD  2021         TONSILLECTOMY         Current Outpatient Medications:     acetaminophen (TYLENOL) 500 MG tablet, Take 2 tablets (1,000 mg total) by  mouth every 8 (eight) hours as needed for Pain., Disp: , Rfl: 0    allopurinoL (ZYLOPRIM) 300 MG tablet, Take 1 tablet (300 mg total) by mouth once daily., Disp: 90 tablet, Rfl: 1    aspirin (ECOTRIN) 81 MG EC tablet, Take 1 tablet (81 mg total) by mouth once daily., Disp: 30 tablet, Rfl: 0    atorvastatin (LIPITOR) 40 MG tablet, atorvastatin 40 mg tablet, Disp: , Rfl:     blood sugar diagnostic Strp, 1 each by Misc.(Non-Drug; Combo Route) route 4 (four) times daily., Disp: 200 each, Rfl: 0    blood-glucose meter Misc, Follow package directions (Patient taking differently: Follow package directions), Disp: 1 each, Rfl: 0    cloNIDine (CATAPRES) 0.1 MG tablet, Take 1 tablet (0.1 mg total) by mouth 2 (two) times daily., Disp: 180 tablet, Rfl: 0    clopidogreL (PLAVIX) 75 mg tablet, Take 1 tablet (75 mg total) by mouth once daily., Disp: 30 tablet, Rfl: 3    evolocumab (REPATHA SURECLICK) 140 mg/mL PnIj, Inject 1 mL (140 mg total) into the skin every 14 (fourteen) days., Disp: 8 each, Rfl: 3    fenofibrate micronized (LOFIBRA) 134 MG Cap, Take 1 capsule (134 mg total) by mouth daily with breakfast., Disp: 90 capsule, Rfl: 3    furosemide (LASIX) 40 MG tablet, Take 1 tablet (40 mg total) by mouth once daily., Disp: 30 tablet, Rfl: 6    HYDROcodone-acetaminophen (NORCO)  mg per tablet, Take 1 tablet by mouth every 8 (eight) hours as needed for Pain., Disp: 90 tablet, Rfl: 0    hydrocortisone (ANUSOL-HC) 2.5 % rectal cream, Procto-Med HC 2.5 % topical cream perineal applicator, Disp: , Rfl:     insulin aspart U-100 (NOVOLOG) 100 unit/mL (3 mL) InPn pen, Inject 12 Units into the skin 3 (three) times daily with meals. Plus correction scale. Max TDD 40units., Disp: 15 mL, Rfl: 3    insulin detemir U-100 (LEVEMIR FLEXTOUCH U-100 INSULN) 100 unit/mL (3 mL) InPn pen, Inject 20 Units into the skin 2 (two) times daily., Disp: 15 mL, Rfl: 3    ipratropium-albuteroL (COMBIVENT)  mcg/actuation inhaler, Inhale 1 puff into  "the lungs by mouth every 6 (six) hours., Disp: 4 g, Rfl: 0    methocarbamoL (ROBAXIN) 500 MG Tab, Take 1 tablet (500 mg total) by mouth 3 (three) times daily as needed (muscle spasm)., Disp: 90 tablet, Rfl: 2    metoclopramide HCl (REGLAN) 5 MG tablet, Take 1 tablet (5 mg total) by mouth 4 (four) times daily as needed (nausea, prevention)., Disp: 30 tablet, Rfl: 3    metoprolol succinate (TOPROL-XL) 100 MG 24 hr tablet, Take 1 tablet (100 mg total) by mouth once daily., Disp: 90 tablet, Rfl: 3    nitroGLYCERIN (NITROSTAT) 0.4 MG SL tablet, Place 1 tablet (0.4 mg total) under the tongue every 5 (five) minutes as needed for Chest pain., Disp: 25 tablet, Rfl: 5    pantoprazole (PROTONIX) 40 MG tablet, Take 1 tablet (40 mg total) by mouth once daily., Disp: 30 tablet, Rfl: 0    pen needle, diabetic (BD ULTRA-FINE HIEN PEN NEEDLE) 32 gauge x 5/32" Ndle, 1 each by Misc.(Non-Drug; Combo Route) route 4 (four) times daily., Disp: 400 each, Rfl: 3    pregabalin (LYRICA) 150 MG capsule, Take 1 capsule (150 mg total) by mouth 3 (three) times daily., Disp: 90 capsule, Rfl: 2    senna-docusate 8.6-50 mg (PERICOLACE) 8.6-50 mg per tablet, Take 1 tablet by mouth 2 (two) times daily as needed for Constipation., Disp: 60 tablet, Rfl: 0    sertraline (ZOLOFT) 100 MG tablet, Take 1 tablet (100 mg total) by mouth once daily., Disp: 90 tablet, Rfl: 3    sulfamethoxazole-trimethoprim 800-160mg (BACTRIM DS) 800-160 mg Tab, sulfamethoxazole 800 mg-trimethoprim 160 mg tablet, Disp: , Rfl:     teriparatide 20 mcg/dose (620mcg/2.48mL) PnIj, Inject 20 mcg into the skin once daily., Disp: 2.48 mL, Rfl: 11    triamcinolone acetonide 0.1% (KENALOG) 0.1 % cream, Apply to affected areas of body twice daily as needed rash. Do not use on face, underarms, or groin., Disp: 454 g, Rfl: 0    TRUEPLUS LANCETS 30 gauge Misc, TEST AS DIRECTED FOUR TIMES DAILY, Disp: 200 each, Rfl: 2    blood-glucose meter,continuous (DEXCOM G6 ) Misc, 1 each by " Misc.(Non-Drug; Combo Route) route continuous prn., Disp: 1 each, Rfl: PRN    blood-glucose sensor (DEXCOM G6 SENSOR) Nicole, 3 each by Misc.(Non-Drug; Combo Route) route continuous prn. Change every 10 days., Disp: 3 each, Rfl: PRN    blood-glucose transmitter (DEXCOM G6 TRANSMITTER) Nicole, 1 each by Misc.(Non-Drug; Combo Route) route continuous prn., Disp: 1 each, Rfl: PRN    losartan (COZAAR) 50 MG tablet, Take 1 tablet (50 mg total) by mouth once daily., Disp: 90 tablet, Rfl: 0    NIFEdipine (PROCARDIA-XL) 30 MG (OSM) 24 hr tablet, Take 1 tablet (30 mg total) by mouth 2 (two) times a day., Disp: 180 tablet, Rfl: 0    Review of patient's allergies indicates:   Allergen Reactions    Bleach (sodium hypochlorite) Shortness Of Breath    Nitrofurantoin macrocrystalline Anaphylaxis    Lipitor [atorvastatin] Diarrhea and Rash    Nsaids (non-steroidal anti-inflammatory drug)     Pcn [penicillins]     Toradol [ketorolac]        Family History   Problem Relation Age of Onset    COPD Mother     Lupus Mother     Hernia Mother     Uterine cancer Mother         vs cervical cancer    Ovarian cancer Mother     Diabetes Father     Coronary artery disease Father     Colon cancer Maternal Grandmother         in her 50's       Social History     Socioeconomic History    Marital status:    Tobacco Use    Smoking status: Former     Types: Cigarettes     Quit date: 2020     Years since quittin.2    Smokeless tobacco: Never   Substance and Sexual Activity    Alcohol use: Yes     Comment: occasionally    Drug use: No    Sexual activity: Not Currently   Social History Narrative    Lives with daughter. .      Social Determinants of Health     Financial Resource Strain: Low Risk     Difficulty of Paying Living Expenses: Not hard at all   Food Insecurity: No Food Insecurity    Worried About Running Out of Food in the Last Year: Never true    Ran Out of Food in the Last Year: Never true   Transportation Needs: No  Transportation Needs    Lack of Transportation (Medical): No    Lack of Transportation (Non-Medical): No   Physical Activity: Insufficiently Active    Days of Exercise per Week: 3 days    Minutes of Exercise per Session: 20 min   Stress: No Stress Concern Present    Feeling of Stress : Not at all   Social Connections: Socially Isolated    Frequency of Communication with Friends and Family: More than three times a week    Frequency of Social Gatherings with Friends and Family: Never    Attends [employer] Services: Never    Active Member of Clubs or Organizations: No    Attends Club or Organization Meetings: Never    Marital Status:    Housing Stability: Low Risk     Unable to Pay for Housing in the Last Year: No    Number of Places Lived in the Last Year: 1    Unstable Housing in the Last Year: No       OB History          5    Para   4    Term   4            AB   1    Living   4         SAB   1    IAB        Ectopic        Multiple        Live Births                     Gyn History    Mammogram: 21 BI-RADS 1, negative  Pap smear: n/a  LMP: No LMP recorded. Patient has had a hysterectomy.   Postmenopausal bleeding: n/a      INITIAL PHYSICAL EXAMINATION    Vitals:    23 1459   BP: 124/80      General: Healthy in appearance, Well nourished, Affect Normal, NAD.  Neck: Supple, No masses, Trachea midline, Thyroid normal size,  non-tender, no masses or nodules.  Nodes: No clavicular/cervical adenopathy.  Skin: Normal temperature, No atypical lesions or rash.  Heart: Normal sounds, no murmurs  Lungs: Normal respiratory effort.  Gastrointestinal: Non tender, Non distended, No masses, guarding or  rebound, No hepatosplenomegally, No hernia.  Ext: No clubbing, cyanosis, edema or varicosities.  DTR's: 2+ bilaterally  Strength 5/5 bilateral upper and lower extremities    Genitourinary-  Vulva: normal without lesions, masses, atrophy or pain  Urethra: meatus central and normal in appearance, no  prolapse/caruncle, no masses or discharge. Empty supine stress test was negative. Spontaneous leakage of urine with insertion of speculum.  Bladder: non-tender, no masses  Vagina: No discharge or lesions, severe atrophy, no masses appreciated.  [See POP-Q]  Cervix: absent  Uterus:  absent  Adnexa: no masses, non tender.  Rectal: deferred   Neuro: decreased sensation S2-4- pin prick and light touch , Anal wink absent  Levator strength: 1/5  Levator tenderness: left 9/10 and right 0/10    POP-Q Exam- pelvic organ prolapse quantitative    Aa   Anterior Wall -2 Ba   Anterior wall -2 C   Cervix or cuff    Gh   Genital hiatus 3 pb   perineal body 3 tvl   Total vaginal length 8   Ap   Posterior wall -2 Bp   Posterior wall -2 D   Posterior fornix     without Uterus    POP-Q Stage:1      No visits with results within 1 Day(s) from this visit.   Latest known visit with results is:   Admission on 01/04/2023, Discharged on 01/05/2023   Component Date Value Ref Range Status    POCT Glucose 01/04/2023 256 (H)  70 - 110 mg/dL Final    WBC 01/05/2023 11.34  3.90 - 12.70 K/uL Final    RBC 01/05/2023 3.94 (L)  4.00 - 5.40 M/uL Final    Hemoglobin 01/05/2023 11.3 (L)  12.0 - 16.0 g/dL Final    Hematocrit 01/05/2023 33.7 (L)  37.0 - 48.5 % Final    MCV 01/05/2023 86  82 - 98 fL Final    MCH 01/05/2023 28.7  27.0 - 31.0 pg Final    MCHC 01/05/2023 33.5  32.0 - 36.0 g/dL Final    RDW 01/05/2023 14.9 (H)  11.5 - 14.5 % Final    Platelets 01/05/2023 272  150 - 450 K/uL Final    MPV 01/05/2023 11.6  9.2 - 12.9 fL Final    Immature Granulocytes 01/05/2023 0.4  0.0 - 0.5 % Final    Gran # (ANC) 01/05/2023 6.5  1.8 - 7.7 K/uL Final    Immature Grans (Abs) 01/05/2023 0.05 (H)  0.00 - 0.04 K/uL Final    Lymph # 01/05/2023 3.3  1.0 - 4.8 K/uL Final    Mono # 01/05/2023 0.8  0.3 - 1.0 K/uL Final    Eos # 01/05/2023 0.6 (H)  0.0 - 0.5 K/uL Final    Baso # 01/05/2023 0.07  0.00 - 0.20 K/uL Final    nRBC 01/05/2023 0  0 /100 WBC Final    Gran %  01/05/2023 57.5  38.0 - 73.0 % Final    Lymph % 01/05/2023 29.1  18.0 - 48.0 % Final    Mono % 01/05/2023 7.1  4.0 - 15.0 % Final    Eosinophil % 01/05/2023 5.3  0.0 - 8.0 % Final    Basophil % 01/05/2023 0.6  0.0 - 1.9 % Final    Differential Method 01/05/2023 Automated   Final    Sodium 01/05/2023 138  136 - 145 mmol/L Final    Potassium 01/05/2023 4.4  3.5 - 5.1 mmol/L Final    Chloride 01/05/2023 103  95 - 110 mmol/L Final    CO2 01/05/2023 24  23 - 29 mmol/L Final    Glucose 01/05/2023 223 (H)  70 - 110 mg/dL Final    BUN 01/05/2023 18  8 - 23 mg/dL Final    Creatinine 01/05/2023 1.4  0.5 - 1.4 mg/dL Final    Calcium 01/05/2023 9.5  8.7 - 10.5 mg/dL Final    Anion Gap 01/05/2023 11  8 - 16 mmol/L Final    eGFR 01/05/2023 39 (A)  >60 mL/min/1.73 m^2 Final    POCT Glucose 01/05/2023 228 (H)  70 - 110 mg/dL Final        ASSESSMENT & PLAN:    Urge incontinence  -     Ambulatory referral/consult to Physical/Occupational Therapy; Future; Expected date: 02/02/2023    Urgency of urination  -     Urinalysis Microscopic  -     Urine culture  -     Urinalysis    Myofascial pain  -     Ambulatory referral/consult to Urogynecology  -     Ambulatory referral/consult to Physical/Occupational Therapy; Future; Expected date: 02/02/2023    Cystocele, midline  Comments:  Grade 1    Rectocele  Comments:  Grade 1       Exam findings and treatment options were discussed in detail with the patient. Her symptoms are consistent with mixed urinary incontinence. We spent time in discussion today of the differences between UUI and ADELA. We reviewed treatment options of each type of incontinence as well, discussing in detail that for UUI she can try conservative measures such as bladder retraining, PT or medications and for ADELA options such as PT, Impressa Poise, pessary use or surgery. Handouts on OAB and bladder training were provided.   Discussed with patient that due to her poorly controlled DM I would not be able to offer her surgical  intervention at this time as the literature indicates a higher complication rate. I have however recommended that she start with fluid titration, bladder training, and pelvic floor physical therapy. She was not able to spontaneously void today and mentioned that she often needs to have an indwelling catheter placed when she is hospitalized. Will plan to have the patient return in a month for a PVR check and follow up visit to re-assess symptoms after she has initiated the bladder training and fluid titration. Reviewed also that due to her lower extremity edema she should wear support stockings and keep her legs elevated above her heart prior to bedtime.   Depending on patient's symptoms at the next visit may consider initiating medication therapy, potentially Myrbetriq.     We did have a lengthy conversation about her DM and improving control as the DM will contribute to her nocturia and OAB symptoms. Reviewed that it also will contribute to her bladder dysfunction given that she already has peripheral neuropathy. Patient verbalized understanding.     All questions were answered today. The patient was encouraged to contact the office as needed with any additional questions or concerns.     Total time spent on visit was 55 minutes.  This includes face to face time and non-face to face time preparing to see the patient (eg, review of tests), Obtaining and/or reviewing separately obtained history, Documenting clinical information in the electronic or other health record, Independently interpreting resultsand communicating results to the patient/family/caregiver, or Care coordination.    Cassie Salgado MD

## 2023-01-28 LAB — BACTERIA UR CULT: NO GROWTH

## 2023-01-30 ENCOUNTER — SPECIALTY PHARMACY (OUTPATIENT)
Dept: PHARMACY | Facility: CLINIC | Age: 78
End: 2023-01-30
Payer: MEDICARE

## 2023-01-30 NOTE — TELEPHONE ENCOUNTER
Specialty Pharmacy - Refill Coordination    Specialty Medication Orders Linked to Encounter      Flowsheet Row Most Recent Value   Medication #1 teriparatide 20 mcg/dose (620mcg/2.48mL) PnIj (Order#[id number], Rx#[id number])            Refill Questions - Documented Responses      Flowsheet Row Most Recent Value   Patient Availability and HIPAA Verification    Does patient want to proceed with activity? Yes   HIPAA/medical authority confirmed? Yes   Relationship to patient of person spoken to? Child   Refill Screening Questions    Changes to allergies? No   Changes to medications? No   New conditions since last clinic visit? No   Unplanned office visit, urgent care, ED, or hospital admission in the last 4 weeks? No   How does patient/caregiver feel medication is working? Good   Financial problems or insurance changes? No   Would patient like to speak to a pharmacist? No   When does the patient need to receive the medication? 02/02/23   Refill Delivery Questions    How will the patient receive the medication? [facility]   When does the patient need to receive the medication? 02/02/23   Shipping Address Home   Address in [facility] confirmed and updated if neccessary? Yes   Expected Copay ($) 4.3   Is the patient able to afford the medication copay? Yes   Payment Method CC on file   Days supply of Refill 28   Supplies needed? No supplies needed   Refill activity completed? Yes   Refill activity plan Refill scheduled   Shipment/Pickup Date: 02/01/23            Current Outpatient Medications   Medication Sig    acetaminophen (TYLENOL) 500 MG tablet Take 2 tablets (1,000 mg total) by mouth every 8 (eight) hours as needed for Pain.    allopurinoL (ZYLOPRIM) 300 MG tablet Take 1 tablet (300 mg total) by mouth once daily.    aspirin (ECOTRIN) 81 MG EC tablet Take 1 tablet (81 mg total) by mouth once daily.    atorvastatin (LIPITOR) 40 MG tablet atorvastatin 40 mg tablet    blood sugar diagnostic Strp 1 each by  Misc.(Non-Drug; Combo Route) route 4 (four) times daily.    blood-glucose meter Misc Follow package directions (Patient taking differently: Follow package directions)    blood-glucose meter,continuous (DEXCOM G6 ) Misc 1 each by Misc.(Non-Drug; Combo Route) route continuous prn.    blood-glucose sensor (DEXCOM G6 SENSOR) Nicole 3 each by Misc.(Non-Drug; Combo Route) route continuous prn. Change every 10 days.    blood-glucose transmitter (DEXCOM G6 TRANSMITTER) Nicole 1 each by Misc.(Non-Drug; Combo Route) route continuous prn.    cloNIDine (CATAPRES) 0.1 MG tablet Take 1 tablet (0.1 mg total) by mouth 2 (two) times daily.    clopidogreL (PLAVIX) 75 mg tablet Take 1 tablet (75 mg total) by mouth once daily.    evolocumab (REPATHA SURECLICK) 140 mg/mL PnIj Inject 1 mL (140 mg total) into the skin every 14 (fourteen) days.    fenofibrate micronized (LOFIBRA) 134 MG Cap Take 1 capsule (134 mg total) by mouth daily with breakfast.    furosemide (LASIX) 40 MG tablet Take 1 tablet (40 mg total) by mouth once daily.    HYDROcodone-acetaminophen (NORCO)  mg per tablet Take 1 tablet by mouth every 8 (eight) hours as needed for Pain.    hydrocortisone (ANUSOL-HC) 2.5 % rectal cream Procto-Med HC 2.5 % topical cream perineal applicator    insulin aspart U-100 (NOVOLOG) 100 unit/mL (3 mL) InPn pen Inject 12 Units into the skin 3 (three) times daily with meals. Plus correction scale. Max TDD 40units.    insulin detemir U-100 (LEVEMIR FLEXTOUCH U-100 INSULN) 100 unit/mL (3 mL) InPn pen Inject 20 Units into the skin 2 (two) times daily.    ipratropium-albuteroL (COMBIVENT)  mcg/actuation inhaler Inhale 1 puff into the lungs by mouth every 6 (six) hours.    losartan (COZAAR) 50 MG tablet Take 1 tablet (50 mg total) by mouth once daily.    methocarbamoL (ROBAXIN) 500 MG Tab Take 1 tablet (500 mg total) by mouth 3 (three) times daily as needed (muscle spasm).    metoclopramide HCl (REGLAN) 5 MG tablet Take 1 tablet (5  "mg total) by mouth 4 (four) times daily as needed (nausea, prevention).    metoprolol succinate (TOPROL-XL) 100 MG 24 hr tablet Take 1 tablet (100 mg total) by mouth once daily.    NIFEdipine (PROCARDIA-XL) 30 MG (OSM) 24 hr tablet Take 1 tablet (30 mg total) by mouth 2 (two) times a day.    nitroGLYCERIN (NITROSTAT) 0.4 MG SL tablet Place 1 tablet (0.4 mg total) under the tongue every 5 (five) minutes as needed for Chest pain.    pantoprazole (PROTONIX) 40 MG tablet Take 1 tablet (40 mg total) by mouth once daily.    pen needle, diabetic (BD ULTRA-FINE HIEN PEN NEEDLE) 32 gauge x 5/32" Ndle 1 each by Misc.(Non-Drug; Combo Route) route 4 (four) times daily.    pregabalin (LYRICA) 150 MG capsule Take 1 capsule (150 mg total) by mouth 3 (three) times daily.    senna-docusate 8.6-50 mg (PERICOLACE) 8.6-50 mg per tablet Take 1 tablet by mouth 2 (two) times daily as needed for Constipation.    sertraline (ZOLOFT) 100 MG tablet Take 1 tablet (100 mg total) by mouth once daily.    sulfamethoxazole-trimethoprim 800-160mg (BACTRIM DS) 800-160 mg Tab sulfamethoxazole 800 mg-trimethoprim 160 mg tablet    teriparatide 20 mcg/dose (620mcg/2.48mL) PnIj Inject 20 mcg into the skin once daily.    triamcinolone acetonide 0.1% (KENALOG) 0.1 % cream Apply to affected areas of body twice daily as needed rash. Do not use on face, underarms, or groin.    TRUEPLUS LANCETS 30 gauge Misc TEST AS DIRECTED FOUR TIMES DAILY   Last reviewed on 1/26/2023  7:19 PM by Cassie Salgado MD    Review of patient's allergies indicates:   Allergen Reactions    Bleach (sodium hypochlorite) Shortness Of Breath    Nitrofurantoin macrocrystalline Anaphylaxis    Lipitor [atorvastatin] Diarrhea and Rash    Nsaids (non-steroidal anti-inflammatory drug)     Pcn [penicillins]     Toradol [ketorolac]     Last reviewed on  1/26/2023 7:19 PM by Cassie Salgado      Tasks added this encounter   2/23/2023 - Refill Call (Auto Added)   Tasks due within next 3 months   No " tasks due.     Robert, PharmD  [facility]  [address]  [place] [address]  Phone: [phone]  Fax: [phone]

## 2023-01-31 ENCOUNTER — PES CALL (OUTPATIENT)
Dept: ADMINISTRATIVE | Facility: CLINIC | Age: 78
End: 2023-01-31
Payer: MEDICARE

## 2023-02-01 DIAGNOSIS — E79.0 HYPERURICEMIA: ICD-10-CM

## 2023-02-01 DIAGNOSIS — I25.10 CORONARY ARTERY CALCIFICATION: Chronic | ICD-10-CM

## 2023-02-01 DIAGNOSIS — I10 ESSENTIAL HYPERTENSION: ICD-10-CM

## 2023-02-01 DIAGNOSIS — I25.118 CORONARY ARTERY DISEASE OF NATIVE ARTERY OF NATIVE HEART WITH STABLE ANGINA PECTORIS: ICD-10-CM

## 2023-02-01 DIAGNOSIS — K31.84 GASTROPARESIS: ICD-10-CM

## 2023-02-01 DIAGNOSIS — I25.84 CORONARY ARTERY CALCIFICATION: Chronic | ICD-10-CM

## 2023-02-02 RX ORDER — METOCLOPRAMIDE 5 MG/1
5 TABLET ORAL 4 TIMES DAILY PRN
Qty: 30 TABLET | Refills: 3 | Status: SHIPPED | OUTPATIENT
Start: 2023-02-02 | End: 2023-04-21 | Stop reason: SDUPTHER

## 2023-02-02 RX ORDER — ALLOPURINOL 300 MG/1
300 TABLET ORAL DAILY
Qty: 90 TABLET | Refills: 1 | Status: SHIPPED | OUTPATIENT
Start: 2023-02-02 | End: 2023-08-09 | Stop reason: SDUPTHER

## 2023-02-02 RX ORDER — CLONIDINE HYDROCHLORIDE 0.1 MG/1
0.1 TABLET ORAL 2 TIMES DAILY
Qty: 180 TABLET | Refills: 0 | Status: SHIPPED | OUTPATIENT
Start: 2023-02-02 | End: 2023-06-15

## 2023-02-02 RX ORDER — CLOPIDOGREL BISULFATE 75 MG/1
75 TABLET ORAL DAILY
Qty: 30 TABLET | Refills: 3 | Status: SHIPPED | OUTPATIENT
Start: 2023-02-02 | End: 2023-02-03

## 2023-02-02 NOTE — TELEPHONE ENCOUNTER
No new care gaps identified.  [facility] Embedded Care Gaps. Reference number: [id number]. 2/01/2023   9:27:53 PM CST

## 2023-02-02 NOTE — TELEPHONE ENCOUNTER
No new care gaps identified.  [facility] Embedded Care Gaps. Reference number: [id number]. 2/01/2023   9:28:50 PM CST

## 2023-02-03 ENCOUNTER — LAB VISIT (OUTPATIENT)
Dept: LAB | Facility: OTHER | Age: 78
End: 2023-02-03
Attending: INTERNAL MEDICINE
Payer: MEDICARE

## 2023-02-03 ENCOUNTER — PATIENT MESSAGE (OUTPATIENT)
Dept: INTERNAL MEDICINE | Facility: CLINIC | Age: 78
End: 2023-02-03
Payer: MEDICARE

## 2023-02-03 ENCOUNTER — OFFICE VISIT (OUTPATIENT)
Dept: CARDIOLOGY | Facility: CLINIC | Age: 78
End: 2023-02-03
Payer: MEDICARE

## 2023-02-03 VITALS
SYSTOLIC BLOOD PRESSURE: 122 MMHG | OXYGEN SATURATION: 94 % | HEART RATE: 85 BPM | DIASTOLIC BLOOD PRESSURE: 74 MMHG | BODY MASS INDEX: 36.69 KG/M2 | WEIGHT: 194.19 LBS

## 2023-02-03 DIAGNOSIS — E66.01 SEVERE OBESITY: ICD-10-CM

## 2023-02-03 DIAGNOSIS — E78.2 MIXED HYPERLIPIDEMIA: ICD-10-CM

## 2023-02-03 DIAGNOSIS — I25.118 ATHEROSCLEROSIS OF NATIVE CORONARY ARTERY OF NATIVE HEART WITH STABLE ANGINA PECTORIS: Primary | ICD-10-CM

## 2023-02-03 DIAGNOSIS — Z79.4 TYPE 2 DIABETES MELLITUS WITH HYPERGLYCEMIA, WITH LONG-TERM CURRENT USE OF INSULIN: ICD-10-CM

## 2023-02-03 DIAGNOSIS — E11.65 TYPE 2 DIABETES MELLITUS WITH HYPERGLYCEMIA, WITH LONG-TERM CURRENT USE OF INSULIN: ICD-10-CM

## 2023-02-03 DIAGNOSIS — I10 PRIMARY HYPERTENSION: ICD-10-CM

## 2023-02-03 DIAGNOSIS — Z01.810 PREOPERATIVE CARDIOVASCULAR EXAMINATION: ICD-10-CM

## 2023-02-03 DIAGNOSIS — I70.0 AORTIC ATHEROSCLEROSIS: ICD-10-CM

## 2023-02-03 LAB
ANION GAP SERPL CALC-SCNC: 9 MMOL/L (ref 8–16)
BUN SERPL-MCNC: 19 MG/DL (ref 8–23)
CALCIUM SERPL-MCNC: 9.6 MG/DL (ref 8.7–10.5)
CHLORIDE SERPL-SCNC: 102 MMOL/L (ref 95–110)
CO2 SERPL-SCNC: 26 MMOL/L (ref 23–29)
CREAT SERPL-MCNC: 1.5 MG/DL (ref 0.5–1.4)
EST. GFR  (NO RACE VARIABLE): 36 ML/MIN/1.73 M^2
ESTIMATED AVG GLUCOSE: 237 MG/DL (ref 68–131)
GLUCOSE SERPL-MCNC: 371 MG/DL (ref 70–110)
HBA1C MFR BLD: 9.9 % (ref 4–5.6)
POTASSIUM SERPL-SCNC: 4.5 MMOL/L (ref 3.5–5.1)
SODIUM SERPL-SCNC: 137 MMOL/L (ref 136–145)

## 2023-02-03 PROCEDURE — 99215 OFFICE O/P EST HI 40 MIN: CPT | Mod: S$PBB,,, | Performed by: INTERNAL MEDICINE

## 2023-02-03 PROCEDURE — 80048 BASIC METABOLIC PNL TOTAL CA: CPT | Performed by: INTERNAL MEDICINE

## 2023-02-03 PROCEDURE — 99215 PR OFFICE/OUTPT VISIT, EST, LEVL V, 40-54 MIN: ICD-10-PCS | Mod: S$PBB,,, | Performed by: INTERNAL MEDICINE

## 2023-02-03 PROCEDURE — 99999 PR PBB SHADOW E&M-EST. PATIENT-LVL III: CPT | Mod: PBBFAC,,, | Performed by: INTERNAL MEDICINE

## 2023-02-03 PROCEDURE — 99213 OFFICE O/P EST LOW 20 MIN: CPT | Mod: PBBFAC | Performed by: INTERNAL MEDICINE

## 2023-02-03 PROCEDURE — 99999 PR PBB SHADOW E&M-EST. PATIENT-LVL III: ICD-10-PCS | Mod: PBBFAC,,, | Performed by: INTERNAL MEDICINE

## 2023-02-03 PROCEDURE — 83036 HEMOGLOBIN GLYCOSYLATED A1C: CPT | Performed by: INTERNAL MEDICINE

## 2023-02-03 PROCEDURE — 36415 COLL VENOUS BLD VENIPUNCTURE: CPT | Performed by: INTERNAL MEDICINE

## 2023-02-03 NOTE — PROGRESS NOTES
[facility]    Chief Complaint  Chief Complaint   Patient presents with    Pre-op Exam       HPI:    Comes in seeking preoperative cardiac evaluation.  She is scheduled to have implantation of a spinal cord stimulator.  Reports that she is been doing well from a cardiac standpoint.  Has been walking for exercise.  Also got a trampoline for [place] to exercise on.  No problems with exertional chest pain.  Has occasional arrest episodes of chest discomfort, last 1 was at the beginning of this week.  Relieved after a few minutes and a single sublingual nitroglycerin.    Medications  Current Outpatient Medications   Medication Sig Dispense Refill    acetaminophen (TYLENOL) 500 MG tablet Take 2 tablets (1,000 mg total) by mouth every 8 (eight) hours as needed for Pain.  0    allopurinoL (ZYLOPRIM) 300 MG tablet Take 1 tablet (300 mg total) by mouth once daily. 90 tablet 1    aspirin (ECOTRIN) 81 MG EC tablet Take 1 tablet (81 mg total) by mouth once daily. 30 tablet 0    atorvastatin (LIPITOR) 40 MG tablet atorvastatin 40 mg tablet      blood sugar diagnostic Strp 1 each by Misc.(Non-Drug; Combo Route) route 4 (four) times daily. 200 each 0    blood-glucose meter Misc Follow package directions (Patient taking differently: Follow package directions) 1 each 0    blood-glucose meter,continuous (DEXCOM G6 ) Misc 1 each by Misc.(Non-Drug; Combo Route) route continuous prn. 1 each PRN    blood-glucose sensor (DEXCOM G6 SENSOR) Nicole 3 each by Misc.(Non-Drug; Combo Route) route continuous prn. Change every 10 days. 3 each PRN    blood-glucose transmitter (DEXCOM G6 TRANSMITTER) Nicole 1 each by Misc.(Non-Drug; Combo Route) route continuous prn. 1 each PRN    cloNIDine (CATAPRES) 0.1 MG tablet Take 1 tablet (0.1 mg total) by mouth 2 (two) times daily. 180 tablet 0    evolocumab (REPATHA SURECLICK) 140 mg/mL PnIj Inject 1 mL (140 mg total) into the skin every 14 (fourteen) days. 8 each 3    fenofibrate  "micronized (LOFIBRA) 134 MG Cap Take 1 capsule (134 mg total) by mouth daily with breakfast. 90 capsule 3    furosemide (LASIX) 40 MG tablet Take 1 tablet (40 mg total) by mouth once daily. 30 tablet 6    HYDROcodone-acetaminophen (NORCO)  mg per tablet Take 1 tablet by mouth every 8 (eight) hours as needed for Pain. 90 tablet 0    hydrocortisone (ANUSOL-HC) 2.5 % rectal cream Procto-Med HC 2.5 % topical cream perineal applicator      insulin aspart U-100 (NOVOLOG) 100 unit/mL (3 mL) InPn pen Inject 12 Units into the skin 3 (three) times daily with meals. Plus correction scale. Max TDD 40units. 15 mL 3    insulin detemir U-100 (LEVEMIR FLEXTOUCH U-100 INSULN) 100 unit/mL (3 mL) InPn pen Inject 20 Units into the skin 2 (two) times daily. 15 mL 3    ipratropium-albuteroL (COMBIVENT)  mcg/actuation inhaler Inhale 1 puff into the lungs by mouth every 6 (six) hours. 4 g 0    losartan (COZAAR) 50 MG tablet Take 1 tablet (50 mg total) by mouth once daily. 90 tablet 0    methocarbamoL (ROBAXIN) 500 MG Tab Take 1 tablet (500 mg total) by mouth 3 (three) times daily as needed (muscle spasm). 90 tablet 2    metoclopramide HCl (REGLAN) 5 MG tablet Take 1 tablet (5 mg total) by mouth 4 (four) times daily as needed (nausea, prevention). 30 tablet 3    metoprolol succinate (TOPROL-XL) 100 MG 24 hr tablet Take 1 tablet (100 mg total) by mouth once daily. 90 tablet 3    NIFEdipine (PROCARDIA-XL) 30 MG (OSM) 24 hr tablet Take 1 tablet (30 mg total) by mouth 2 (two) times a day. 180 tablet 0    nitroGLYCERIN (NITROSTAT) 0.4 MG SL tablet Place 1 tablet (0.4 mg total) under the tongue every 5 (five) minutes as needed for Chest pain. 25 tablet 5    pantoprazole (PROTONIX) 40 MG tablet Take 1 tablet (40 mg total) by mouth once daily. 30 tablet 0    pen needle, diabetic (BD ULTRA-FINE HIEN PEN NEEDLE) 32 gauge x 5/32" Ndle 1 each by Misc.(Non-Drug; Combo Route) route 4 (four) times daily. 400 each 3    pregabalin (LYRICA) 150 " MG capsule Take 1 capsule (150 mg total) by mouth 3 (three) times daily. 90 capsule 2    senna-docusate 8.6-50 mg (PERICOLACE) 8.6-50 mg per tablet Take 1 tablet by mouth 2 (two) times daily as needed for Constipation. 60 tablet 0    sertraline (ZOLOFT) 100 MG tablet Take 1 tablet (100 mg total) by mouth once daily. 90 tablet 3    sulfamethoxazole-trimethoprim 800-160mg (BACTRIM DS) 800-160 mg Tab sulfamethoxazole 800 mg-trimethoprim 160 mg tablet      teriparatide 20 mcg/dose (620mcg/2.48mL) PnIj Inject 20 mcg into the skin once daily. 2.48 mL 11    triamcinolone acetonide 0.1% (KENALOG) 0.1 % cream Apply to affected areas of body twice daily as needed rash. Do not use on face, underarms, or groin. 454 g 0    TRUEPLUS LANCETS 30 gauge Misc TEST AS DIRECTED FOUR TIMES DAILY 200 each 2     No current facility-administered medications for this visit.        History  Past Medical History:   Diagnosis Date    Arthritis     Back pain     Cancer     ovarian    Cervical cancer     Depression     Diabetes mellitus     Fibromyalgia     Heart attack     Hyperlipidemia     Hypertension     Lupus     Stroke     slight left sided weakness     Past Surgical History:   Procedure Laterality Date    APPENDECTOMY       SECTION      2    CORONARY ANGIOGRAPHY N/A 2022    Procedure: ANGIOGRAM, CORONARY ARTERY;  Surgeon: Jose L Méndez MD;  Location: [facility] CATH LAB;  Service: Cardiology;  Laterality: N/A;    HYSTERECTOMY      with USO for cervical cancer    INJECTION OF ANESTHETIC AGENT AROUND NERVE Bilateral 2021    Procedure: BLOCK, NERVE, SYMPATHIC;  Surgeon: Holden Pereira MD;  Location: [facility] PAIN MGT;  Service: Pain Management;  Laterality: Bilateral;    INJECTION OF ANESTHETIC AGENT AROUND NERVE N/A 2021    Procedure: BLOCK, NERVE, SYMPATHETIC  need consent;  Surgeon: Holden Pereira MD;  Location: [facility] PAIN MGT;  Service: Pain Management;  Laterality: N/A;    OH EVAL,SWALLOW FUNCTION,CINE/VIDEO  RECORD  2021         TONSILLECTOMY       Social History     Socioeconomic History    Marital status:    Tobacco Use    Smoking status: Former     Types: Cigarettes     Quit date: 2020     Years since quittin.2    Smokeless tobacco: Never   Substance and Sexual Activity    Alcohol use: Yes     Comment: occasionally    Drug use: No    Sexual activity: Not Currently   Social History Narrative    Lives with daughter. .      Social Determinants of Health     Financial Resource Strain: Low Risk     Difficulty of Paying Living Expenses: Not hard at all   Food Insecurity: No Food Insecurity    Worried About Running Out of Food in the Last Year: Never true    Ran Out of Food in the Last Year: Never true   Transportation Needs: No Transportation Needs    Lack of Transportation (Medical): No    Lack of Transportation (Non-Medical): No   Physical Activity: Insufficiently Active    Days of Exercise per Week: 3 days    Minutes of Exercise per Session: 20 min   Stress: No Stress Concern Present    Feeling of Stress : Not at all   Social Connections: Socially Isolated    Frequency of Communication with Friends and Family: More than three times a week    Frequency of Social Gatherings with Friends and Family: Never    Attends L[employer]-day Services: Never    Active Member of Clubs or Organizations: No    Attends Club or Organization Meetings: Never    Marital Status:    Housing Stability: Low Risk     Unable to Pay for Housing in the Last Year: No    Number of Places Lived in the Last Year: 1    Unstable Housing in the Last Year: No     Family History   Problem Relation Age of Onset    COPD Mother     Lupus Mother     Hernia Mother     Uterine cancer Mother         vs cervical cancer    Ovarian cancer Mother     Diabetes Father     Coronary artery disease Father     Colon cancer Maternal Grandmother         in her 50's        Allergies  Review of patient's allergies indicates:   Allergen Reactions     Bleach (sodium hypochlorite) Shortness Of Breath    Nitrofurantoin macrocrystalline Anaphylaxis    Lipitor [atorvastatin] Diarrhea and Rash    Nsaids (non-steroidal anti-inflammatory drug)     Pcn [penicillins]     Toradol [ketorolac]        Review of Systems   Review of Systems   Constitutional: Negative for chills, fever and malaise/fatigue.   HENT:  Negative for nosebleeds.    Eyes:  Negative for blurred vision, double vision, vision loss in left eye and vision loss in right eye.   Cardiovascular:  Positive for chest pain. Negative for claudication, dyspnea on exertion, irregular heartbeat, leg swelling, near-syncope, orthopnea, palpitations, paroxysmal nocturnal dyspnea and syncope.   Respiratory:  Negative for cough, hemoptysis, shortness of breath and wheezing.    Endocrine: Negative for cold intolerance and heat intolerance.   Hematologic/Lymphatic: Negative for bleeding problem. Does not bruise/bleed easily.   Skin:  Negative for color change.   Musculoskeletal:  Negative for falls, muscle weakness and myalgias.   Gastrointestinal:  Negative for abdominal pain, heartburn, hematemesis, hematochezia, hemorrhoids, jaundice, melena, nausea and vomiting.   Genitourinary:  Negative for dysuria, hematuria and nocturia.   Neurological:  Negative for dizziness, focal weakness, headaches, light-headedness, loss of balance, numbness, vertigo and weakness.   Psychiatric/Behavioral:  Negative for altered mental status, depression and memory loss. The patient is not nervous/anxious.    Allergic/Immunologic: Negative for hives and persistent infections.     Physical Exam  Vitals:    02/03/23 1118   BP: 122/74   Pulse: 85     Wt Readings from Last 1 Encounters:   02/03/23 88.1 kg (194 lb 3.2 oz)     Physical Exam  Vitals and nursing note reviewed.   Constitutional:       General: She is not in acute distress.     Appearance: She is obese. She is not toxic-appearing or diaphoretic.   HENT:      Head: Normocephalic and  atraumatic.      Mouth/Throat:      Lips: Pink.      Mouth: Mucous membranes are moist.   Eyes:      General: No scleral icterus.     Conjunctiva/sclera: Conjunctivae normal.   Neck:      Thyroid: No thyromegaly.      Vascular: No carotid bruit, hepatojugular reflux or JVD.      Trachea: Trachea normal.   Cardiovascular:      Rate and Rhythm: Normal rate and regular rhythm. No extrasystoles are present.     Chest Wall: PMI is not displaced.      Pulses:           Carotid pulses are 2+ on the right side and 2+ on the left side.       Radial pulses are 2+ on the right side and 2+ on the left side.      Heart sounds: S1 normal and S2 normal. No murmur heard.    No friction rub. No S3 or S4 sounds.   Pulmonary:      Effort: Pulmonary effort is normal. No accessory muscle usage or respiratory distress.      Breath sounds: Normal breath sounds and air entry. No decreased breath sounds, wheezing, rhonchi or rales.   Abdominal:      General: Bowel sounds are normal. There is no distension or abdominal bruit.      Palpations: Abdomen is soft. There is no hepatomegaly, splenomegaly or pulsatile mass.      Tenderness: There is no abdominal tenderness.   Musculoskeletal:         General: No tenderness or deformity.      Right lower leg: No edema.      Left lower leg: No edema.   Skin:     General: Skin is warm and dry.      Capillary Refill: Capillary refill takes less than 2 seconds.      Coloration: Skin is not cyanotic or pale.      Nails: There is no clubbing.   Neurological:      General: No focal deficit present.      Mental Status: She is alert and oriented to person, place, and time.   Psychiatric:         Attention and Perception: Attention normal.         Mood and Affect: Mood normal.         Speech: Speech normal.         Behavior: Behavior normal. Behavior is cooperative.       Labs  Office Visit on 01/26/2023   Component Date Value Ref Range Status    Urine Culture, Routine 01/26/2023 No growth   Final   Admission  on 01/04/2023, Discharged on 01/05/2023   Component Date Value Ref Range Status    POCT Glucose 01/04/2023 256 (H)  70 - 110 mg/dL Final    WBC 01/05/2023 11.34  3.90 - 12.70 K/uL Final    RBC 01/05/2023 3.94 (L)  4.00 - 5.40 M/uL Final    Hemoglobin 01/05/2023 11.3 (L)  12.0 - 16.0 g/dL Final    Hematocrit 01/05/2023 33.7 (L)  37.0 - 48.5 % Final    MCV 01/05/2023 86  82 - 98 fL Final    MCH 01/05/2023 28.7  27.0 - 31.0 pg Final    MCHC 01/05/2023 33.5  32.0 - 36.0 g/dL Final    RDW 01/05/2023 14.9 (H)  11.5 - 14.5 % Final    Platelets 01/05/2023 272  150 - 450 K/uL Final    MPV 01/05/2023 11.6  9.2 - 12.9 fL Final    Immature Granulocytes 01/05/2023 0.4  0.0 - 0.5 % Final    Gran # (ANC) 01/05/2023 6.5  1.8 - 7.7 K/uL Final    Immature Grans (Abs) 01/05/2023 0.05 (H)  0.00 - 0.04 K/uL Final    Comment: Mild elevation in immature granulocytes is non specific and   can be seen in a variety of conditions including stress response,   acute inflammation, trauma and pregnancy. Correlation with other   laboratory and clinical findings is essential.      Lymph # 01/05/2023 3.3  1.0 - 4.8 K/uL Final    Mono # 01/05/2023 0.8  0.3 - 1.0 K/uL Final    Eos # 01/05/2023 0.6 (H)  0.0 - 0.5 K/uL Final    Baso # 01/05/2023 0.07  0.00 - 0.20 K/uL Final    nRBC 01/05/2023 0  0 /100 WBC Final    Gran % 01/05/2023 57.5  38.0 - 73.0 % Final    Lymph % 01/05/2023 29.1  18.0 - 48.0 % Final    Mono % 01/05/2023 7.1  4.0 - 15.0 % Final    Eosinophil % 01/05/2023 5.3  0.0 - 8.0 % Final    Basophil % 01/05/2023 0.6  0.0 - 1.9 % Final    Differential Method 01/05/2023 Automated   Final    Sodium 01/05/2023 138  136 - 145 mmol/L Final    Potassium 01/05/2023 4.4  3.5 - 5.1 mmol/L Final    Chloride 01/05/2023 103  95 - 110 mmol/L Final    CO2 01/05/2023 24  23 - 29 mmol/L Final    Glucose 01/05/2023 223 (H)  70 - 110 mg/dL Final    BUN 01/05/2023 18  8 - 23 mg/dL Final    Creatinine 01/05/2023 1.4  0.5 - 1.4 mg/dL Final    Calcium 01/05/2023  9.5  8.7 - 10.5 mg/dL Final    Anion Gap 01/05/2023 11  8 - 16 mmol/L Final    eGFR 01/05/2023 39 (A)  >60 mL/min/1.73 m^2 Final    POCT Glucose 01/05/2023 228 (H)  70 - 110 mg/dL Final   Lab Visit on 12/05/2022   Component Date Value Ref Range Status    Specimen UA 12/05/2022 Urine, Clean Catch   Final    Color, UA 12/05/2022 Yellow  Yellow, Straw, Aye Final    Appearance, UA 12/05/2022 Clear  Clear Final    pH, UA 12/05/2022 6.0  5.0 - 8.0 Final    Specific Gravity, UA 12/05/2022 1.010  1.005 - 1.030 Final    Protein, UA 12/05/2022 Negative  Negative Final    Comment: Recommend a 24 hour urine protein or a urine   protein/creatinine ratio if globulin induced proteinuria is  clinically suspected.      Glucose, UA 12/05/2022 3+ (A)  Negative Final    Ketones, UA 12/05/2022 Negative  Negative Final    Bilirubin (UA) 12/05/2022 Negative  Negative Final    Occult Blood UA 12/05/2022 Trace (A)  Negative Final    Nitrite, UA 12/05/2022 Negative  Negative Final    Urobilinogen, UA 12/05/2022 Negative  <2.0 EU/dL Final    Leukocytes, UA 12/05/2022 2+ (A)  Negative Final    Protein, Urine Random 12/05/2022 8  0 - 15 mg/dL Final    Creatinine, Urine 12/05/2022 32.4  15.0 - 325.0 mg/dL Final    Prot/Creat Ratio, Urine 12/05/2022 0.25 (H)  0.00 - 0.20 Final    RBC, UA 12/05/2022 2  0 - 4 /hpf Final    WBC, UA 12/05/2022 25 (H)  0 - 5 /hpf Final    WBC Clumps, UA 12/05/2022 Few (A)  None-Rare Final    Bacteria 12/05/2022 Many (A)  None-Occ /hpf Final    Yeast, UA 12/05/2022 None  None Final    Squam Epithel, UA 12/05/2022 3  /hpf Final    Hyaline Casts, UA 12/05/2022 5 (A)  0-1/lpf /lpf Final    Microscopic Comment 12/05/2022 SEE COMMENT   Final    Comment: Other formed elements not mentioned in the report are not   present in the microscopic examination.      Lab Visit on 12/05/2022   Component Date Value Ref Range Status    Sodium 12/05/2022 137  136 - 145 mmol/L Final    Potassium 12/05/2022 4.6  3.5 - 5.1 mmol/L Final     Chloride 12/05/2022 103  95 - 110 mmol/L Final    CO2 12/05/2022 24  23 - 29 mmol/L Final    Glucose 12/05/2022 386 (H)  70 - 110 mg/dL Final    BUN 12/05/2022 21  8 - 23 mg/dL Final    Creatinine 12/05/2022 1.5 (H)  0.5 - 1.4 mg/dL Final    Calcium 12/05/2022 9.5  8.7 - 10.5 mg/dL Final    Total Protein 12/05/2022 7.0  6.0 - 8.4 g/dL Final    Albumin 12/05/2022 3.6  3.5 - 5.2 g/dL Final    Total Bilirubin 12/05/2022 0.2  0.1 - 1.0 mg/dL Final    Comment: For infants and newborns, interpretation of results should be based  on gestational age, weight and in agreement with clinical  observations.    Premature Infant recommended reference ranges:  Up to 24 hours.............<8.0 mg/dL  Up to 48 hours............<12.0 mg/dL  3-5 days..................<15.0 mg/dL  6-29 days.................<15.0 mg/dL      Alkaline Phosphatase 12/05/2022 69  55 - 135 U/L Final    AST 12/05/2022 23  10 - 40 U/L Final    ALT 12/05/2022 23  10 - 44 U/L Final    Anion Gap 12/05/2022 10  8 - 16 mmol/L Final    eGFR 12/05/2022 36 (A)  >60 mL/min/1.73 m^2 Final    WBC 12/05/2022 9.20  3.90 - 12.70 K/uL Final    RBC 12/05/2022 3.68 (L)  4.00 - 5.40 M/uL Final    Hemoglobin 12/05/2022 10.4 (L)  12.0 - 16.0 g/dL Final    Hematocrit 12/05/2022 32.3 (L)  37.0 - 48.5 % Final    MCV 12/05/2022 88  82 - 98 fL Final    MCH 12/05/2022 28.3  27.0 - 31.0 pg Final    MCHC 12/05/2022 32.2  32.0 - 36.0 g/dL Final    RDW 12/05/2022 15.3 (H)  11.5 - 14.5 % Final    Platelets 12/05/2022 292  150 - 450 K/uL Final    MPV 12/05/2022 12.1  9.2 - 12.9 fL Final    Immature Granulocytes 12/05/2022 0.5  0.0 - 0.5 % Final    Gran # (ANC) 12/05/2022 6.0  1.8 - 7.7 K/uL Final    Immature Grans (Abs) 12/05/2022 0.05 (H)  0.00 - 0.04 K/uL Final    Comment: Mild elevation in immature granulocytes is non specific and   can be seen in a variety of conditions including stress response,   acute inflammation, trauma and pregnancy. Correlation with other   laboratory and clinical  findings is essential.      Lymph # 12/05/2022 2.1  1.0 - 4.8 K/uL Final    Mono # 12/05/2022 0.5  0.3 - 1.0 K/uL Final    Eos # 12/05/2022 0.5  0.0 - 0.5 K/uL Final    Baso # 12/05/2022 0.05  0.00 - 0.20 K/uL Final    nRBC 12/05/2022 0  0 /100 WBC Final    Gran % 12/05/2022 65.6  38.0 - 73.0 % Final    Lymph % 12/05/2022 22.7  18.0 - 48.0 % Final    Mono % 12/05/2022 5.7  4.0 - 15.0 % Final    Eosinophil % 12/05/2022 5.0  0.0 - 8.0 % Final    Basophil % 12/05/2022 0.5  0.0 - 1.9 % Final    Differential Method 12/05/2022 Automated   Final    PTH, Intact 12/05/2022 30.1  9.0 - 77.0 pg/mL Final    Phosphorus 12/05/2022 3.3  2.7 - 4.5 mg/dL Final    Hemoglobin A1C 12/05/2022 10.2 (H)  4.0 - 5.6 % Final    Comment: ADA Screening Guidelines:  5.7-6.4%  Consistent with prediabetes  >or=6.5%  Consistent with diabetes    High levels of fetal hemoglobin interfere with the HbA1C  assay. Heterozygous hemoglobin variants (HbS, HgC, etc)do  not significantly interfere with this assay.   However, presence of multiple variants may affect accuracy.      Estimated Avg Glucose 12/05/2022 246 (H)  68 - 131 mg/dL Final    Uric Acid 12/05/2022 3.5  2.4 - 5.7 mg/dL Final   Lab Visit on 09/30/2022   Component Date Value Ref Range Status    Sodium 09/30/2022 139  136 - 145 mmol/L Final    Potassium 09/30/2022 4.5  3.5 - 5.1 mmol/L Final    Chloride 09/30/2022 106  95 - 110 mmol/L Final    CO2 09/30/2022 22 (L)  23 - 29 mmol/L Final    Glucose 09/30/2022 223 (H)  70 - 110 mg/dL Final    BUN 09/30/2022 16  8 - 23 mg/dL Final    Creatinine 09/30/2022 1.4  0.5 - 1.4 mg/dL Final    Calcium 09/30/2022 9.4  8.7 - 10.5 mg/dL Final    Total Protein 09/30/2022 7.2  6.0 - 8.4 g/dL Final    Albumin 09/30/2022 3.6  3.5 - 5.2 g/dL Final    Total Bilirubin 09/30/2022 0.2  0.1 - 1.0 mg/dL Final    Comment: For infants and newborns, interpretation of results should be based  on gestational age, weight and in agreement with  clinical  observations.    Premature Infant recommended reference ranges:  Up to 24 hours.............<8.0 mg/dL  Up to 48 hours............<12.0 mg/dL  3-5 days..................<15.0 mg/dL  6-29 days.................<15.0 mg/dL      Alkaline Phosphatase 09/30/2022 75  55 - 135 U/L Final    AST 09/30/2022 20  10 - 40 U/L Final    ALT 09/30/2022 17  10 - 44 U/L Final    Anion Gap 09/30/2022 11  8 - 16 mmol/L Final    eGFR 09/30/2022 39 (A)  >60 mL/min/1.73 m^2 Final    Cholesterol 09/30/2022 171  120 - 199 mg/dL Final    Comment: The National Cholesterol Education Program (NCEP) has set the  following guidelines (reference ranges) for Cholesterol:  Optimal.....................<200 mg/dL  Borderline High.............200-239 mg/dL  High........................> or = 240 mg/dL      Triglycerides 09/30/2022 210 (H)  30 - 150 mg/dL Final    Comment: The National Cholesterol Education Program (NCEP) has set the  following guidelines (reference values) for triglycerides:  Normal......................<150 mg/dL  Borderline High.............150-199 mg/dL  High........................200-499 mg/dL      HDL 09/30/2022 38 (L)  40 - 75 mg/dL Final    Comment: The National Cholesterol Education Program (NCEP) has set the  following guidelines (reference values) for HDL Cholesterol:  Low...............<40 mg/dL  Optimal...........>60 mg/dL      LDL Cholesterol 09/30/2022 91.0  63.0 - 159.0 mg/dL Final    Comment: The National Cholesterol Education Program (NCEP) has set the  following guidelines (reference values) for LDL Cholesterol:  Optimal.......................<130 mg/dL  Borderline High...............130-159 mg/dL  High..........................160-189 mg/dL  Very High.....................>190 mg/dL      HDL/Cholesterol Ratio 09/30/2022 22.2  20.0 - 50.0 % Final    Total Cholesterol/HDL Ratio 09/30/2022 4.5  2.0 - 5.0 Final    Non-HDL Cholesterol 09/30/2022 133  mg/dL Final    Comment: Risk category and Non-HDL cholesterol  goals:  Coronary heart disease (CHD)or equivalent (10-year risk of CHD >20%):  Non-HDL cholesterol goal     <130 mg/dL  Two or more CHD risk factors and 10-year risk of CHD <= 20%:  Non-HDL cholesterol goal     <160 mg/dL  0 to 1 CHD risk factor:  Non-HDL cholesterol goal     <190 mg/dL      TSH 09/30/2022 2.432  0.400 - 4.000 uIU/mL Final    WBC 09/30/2022 10.85  3.90 - 12.70 K/uL Final    RBC 09/30/2022 3.79 (L)  4.00 - 5.40 M/uL Final    Hemoglobin 09/30/2022 10.6 (L)  12.0 - 16.0 g/dL Final    Hematocrit 09/30/2022 33.4 (L)  37.0 - 48.5 % Final    MCV 09/30/2022 88  82 - 98 fL Final    MCH 09/30/2022 28.0  27.0 - 31.0 pg Final    MCHC 09/30/2022 31.7 (L)  32.0 - 36.0 g/dL Final    RDW 09/30/2022 15.3 (H)  11.5 - 14.5 % Final    Platelets 09/30/2022 314  150 - 450 K/uL Final    MPV 09/30/2022 12.0  9.2 - 12.9 fL Final    Immature Granulocytes 09/30/2022 0.5  0.0 - 0.5 % Final    Gran # (ANC) 09/30/2022 6.7  1.8 - 7.7 K/uL Final    Immature Grans (Abs) 09/30/2022 0.05 (H)  0.00 - 0.04 K/uL Final    Comment: Mild elevation in immature granulocytes is non specific and   can be seen in a variety of conditions including stress response,   acute inflammation, trauma and pregnancy. Correlation with other   laboratory and clinical findings is essential.      Lymph # 09/30/2022 2.8  1.0 - 4.8 K/uL Final    Mono # 09/30/2022 0.8  0.3 - 1.0 K/uL Final    Eos # 09/30/2022 0.5  0.0 - 0.5 K/uL Final    Baso # 09/30/2022 0.06  0.00 - 0.20 K/uL Final    nRBC 09/30/2022 0  0 /100 WBC Final    Gran % 09/30/2022 61.9  38.0 - 73.0 % Final    Lymph % 09/30/2022 25.5  18.0 - 48.0 % Final    Mono % 09/30/2022 7.4  4.0 - 15.0 % Final    Eosinophil % 09/30/2022 4.1  0.0 - 8.0 % Final    Basophil % 09/30/2022 0.6  0.0 - 1.9 % Final    Differential Method 09/30/2022 Automated   Final    Hemoglobin A1C 09/30/2022 11.2 (H)  4.0 - 5.6 % Final    Comment: ADA Screening Guidelines:  5.7-6.4%  Consistent with prediabetes  >or=6.5%  Consistent  with diabetes    High levels of fetal hemoglobin interfere with the HbA1C  assay. Heterozygous hemoglobin variants (HbS, HgC, etc)do  not significantly interfere with this assay.   However, presence of multiple variants may affect accuracy.      Estimated Avg Glucose 09/30/2022 275 (H)  68 - 131 mg/dL Final    PTH, Intact 09/30/2022 38.4  9.0 - 77.0 pg/mL Final    Specimen UA 09/30/2022 Urine, Clean Catch   Final    Color, UA 09/30/2022 Yellow  Yellow, Straw, Aye Final    Appearance, UA 09/30/2022 Clear  Clear Final    pH, UA 09/30/2022 6.0  5.0 - 8.0 Final    Specific Gravity, UA 09/30/2022 <=1.005 (A)  1.005 - 1.030 Final    Protein, UA 09/30/2022 Negative  Negative Final    Comment: Recommend a 24 hour urine protein or a urine   protein/creatinine ratio if globulin induced proteinuria is  clinically suspected.      Glucose, UA 09/30/2022 1+ (A)  Negative Final    Ketones, UA 09/30/2022 Negative  Negative Final    Bilirubin (UA) 09/30/2022 Negative  Negative Final    Occult Blood UA 09/30/2022 Trace (A)  Negative Final    Nitrite, UA 09/30/2022 Negative  Negative Final    Leukocytes, UA 09/30/2022 2+ (A)  Negative Final    Microalbumin, Urine 09/30/2022 15.0  ug/mL Final    Creatinine, Urine 09/30/2022 22.8  15.0 - 325.0 mg/dL Final    Microalb/Creat Ratio 09/30/2022 65.8 (H)  0.0 - 30.0 ug/mg Final    RBC, UA 09/30/2022 3  0 - 4 /hpf Final    WBC, UA 09/30/2022 7 (H)  0 - 5 /hpf Final    Bacteria 09/30/2022 Rare  None-Occ /hpf Final    Yeast, UA 09/30/2022 None  None Final    Squam Epithel, UA 09/30/2022 2  /hpf Final    Microscopic Comment 09/30/2022 SEE COMMENT   Final    Comment: Other formed elements not mentioned in the report are not   present in the microscopic examination.      Admission on 09/27/2022, Discharged on 09/27/2022   Component Date Value Ref Range Status    WBC 09/27/2022 11.73  3.90 - 12.70 K/uL Final    RBC 09/27/2022 3.96 (L)  4.00 - 5.40 M/uL Final    Hemoglobin 09/27/2022 11.1 (L)  12.0  - 16.0 g/dL Final    Hematocrit 09/27/2022 34.1 (L)  37.0 - 48.5 % Final    MCV 09/27/2022 86  82 - 98 fL Final    MCH 09/27/2022 28.0  27.0 - 31.0 pg Final    MCHC 09/27/2022 32.6  32.0 - 36.0 g/dL Final    RDW 09/27/2022 15.3 (H)  11.5 - 14.5 % Final    Platelets 09/27/2022 315  150 - 450 K/uL Final    MPV 09/27/2022 11.4  9.2 - 12.9 fL Final    Immature Granulocytes 09/27/2022 0.6 (H)  0.0 - 0.5 % Final    Gran # (ANC) 09/27/2022 7.2  1.8 - 7.7 K/uL Final    Immature Grans (Abs) 09/27/2022 0.07 (H)  0.00 - 0.04 K/uL Final    Comment: Mild elevation in immature granulocytes is non specific and   can be seen in a variety of conditions including stress response,   acute inflammation, trauma and pregnancy. Correlation with other   laboratory and clinical findings is essential.      Lymph # 09/27/2022 3.2  1.0 - 4.8 K/uL Final    Mono # 09/27/2022 0.8  0.3 - 1.0 K/uL Final    Eos # 09/27/2022 0.3  0.0 - 0.5 K/uL Final    Baso # 09/27/2022 0.06  0.00 - 0.20 K/uL Final    nRBC 09/27/2022 0  0 /100 WBC Final    Gran % 09/27/2022 61.5  38.0 - 73.0 % Final    Lymph % 09/27/2022 27.5  18.0 - 48.0 % Final    Mono % 09/27/2022 7.0  4.0 - 15.0 % Final    Eosinophil % 09/27/2022 2.9  0.0 - 8.0 % Final    Basophil % 09/27/2022 0.5  0.0 - 1.9 % Final    Differential Method 09/27/2022 Automated   Final    Sodium 09/27/2022 138  136 - 145 mmol/L Final    Potassium 09/27/2022 4.3  3.5 - 5.1 mmol/L Final    Chloride 09/27/2022 106  95 - 110 mmol/L Final    CO2 09/27/2022 25  23 - 29 mmol/L Final    Glucose 09/27/2022 143 (H)  70 - 110 mg/dL Final    BUN 09/27/2022 21  8 - 23 mg/dL Final    Creatinine 09/27/2022 1.3  0.5 - 1.4 mg/dL Final    Calcium 09/27/2022 9.3  8.7 - 10.5 mg/dL Final    Total Protein 09/27/2022 7.5  6.0 - 8.4 g/dL Final    Albumin 09/27/2022 3.8  3.5 - 5.2 g/dL Final    Total Bilirubin 09/27/2022 0.2  0.1 - 1.0 mg/dL Final    Comment: For infants and newborns, interpretation of results should be based  on  gestational age, weight and in agreement with clinical  observations.    Premature Infant recommended reference ranges:  Up to 24 hours.............<8.0 mg/dL  Up to 48 hours............<12.0 mg/dL  3-5 days..................<15.0 mg/dL  6-29 days.................<15.0 mg/dL      Alkaline Phosphatase 09/27/2022 80  55 - 135 U/L Final    AST 09/27/2022 23  10 - 40 U/L Final    ALT 09/27/2022 16  10 - 44 U/L Final    Anion Gap 09/27/2022 7 (L)  8 - 16 mmol/L Final    eGFR 09/27/2022 43 (A)  >60 mL/min/1.73 m^2 Final       Imaging  US Lower Extremity Veins Left    Result Date: 1/4/2023  EXAMINATION: US LOWER EXTREMITY VEINS LEFT CLINICAL HISTORY: Other specified soft tissue disorders TECHNIQUE: Duplex and color flow Doppler evaluation and graded compression of the left lower extremity veins was performed. COMPARISON: None FINDINGS: Left thigh veins: The common femoral, femoral, popliteal, upper greater saphenous, and deep femoral veins are patent and free of thrombus. The veins are normally compressible and have normal phasic flow and augmentation response. Left calf veins: The visualized calf veins are patent. Contralateral CFV: The contralateral (right) common femoral vein is patent and free of thrombus. Miscellaneous: None     No evidence of deep venous thrombosis in the left lower extremity. Electronically signed by: Duane Mims Date:    01/04/2023 Time:    23:39      Assessment  1. Atherosclerosis of native coronary artery of native heart with stable angina pectoris  Stable on current medical regimen    2. Aortic atherosclerosis  Needs continued risk factor modification efforts.  Discussed improving her diabetes control.    3. Primary hypertension  Controlled    4. Mixed hyperlipidemia  Triglycerides should improve with diabetes control    5. Severe obesity  Unchanged    6. Preoperative cardiovascular examination  Proceed with surgery without further cardiac workup      Plan and Discussion    Continue current  guideline directed medical therapy.  Discussed perioperative CV risk.    The 10-year ASCVD risk score (Annamarie DK, et al., 2019) is: 36.3%    Values used to calculate the score:      Age: 77 years      Sex: Female      Is Non- : No      Diabetic: Yes      Tobacco smoker: No      Systolic Blood Pressure: 122 mmHg      Is BP treated: Yes      HDL Cholesterol: 38 mg/dL      Total Cholesterol: 171 mg/dL     Follow Up  Follow up in about 3 months (around 5/3/2023).      Jose L Méndez MD

## 2023-02-08 ENCOUNTER — OFFICE VISIT (OUTPATIENT)
Dept: ENDOCRINOLOGY | Facility: CLINIC | Age: 78
End: 2023-02-08
Payer: MEDICARE

## 2023-02-08 VITALS
OXYGEN SATURATION: 96 % | WEIGHT: 194.25 LBS | HEART RATE: 82 BPM | SYSTOLIC BLOOD PRESSURE: 137 MMHG | DIASTOLIC BLOOD PRESSURE: 56 MMHG | BODY MASS INDEX: 36.67 KG/M2 | HEIGHT: 61 IN

## 2023-02-08 DIAGNOSIS — Z79.4 TYPE 2 DIABETES MELLITUS WITH HYPERGLYCEMIA, WITH LONG-TERM CURRENT USE OF INSULIN: Chronic | ICD-10-CM

## 2023-02-08 DIAGNOSIS — M81.0 AGE-RELATED OSTEOPOROSIS WITHOUT CURRENT PATHOLOGICAL FRACTURE: Primary | ICD-10-CM

## 2023-02-08 DIAGNOSIS — E11.65 TYPE 2 DIABETES MELLITUS WITH HYPERGLYCEMIA, WITH LONG-TERM CURRENT USE OF INSULIN: Chronic | ICD-10-CM

## 2023-02-08 DIAGNOSIS — E66.01 CLASS 2 SEVERE OBESITY WITH SERIOUS COMORBIDITY AND BODY MASS INDEX (BMI) OF 36.0 TO 36.9 IN ADULT, UNSPECIFIED OBESITY TYPE: ICD-10-CM

## 2023-02-08 PROCEDURE — 99214 PR OFFICE/OUTPT VISIT, EST, LEVL IV, 30-39 MIN: ICD-10-PCS | Mod: S$GLB,,, | Performed by: INTERNAL MEDICINE

## 2023-02-08 PROCEDURE — 99214 OFFICE O/P EST MOD 30 MIN: CPT | Mod: S$GLB,,, | Performed by: INTERNAL MEDICINE

## 2023-02-08 NOTE — PATIENT INSTRUCTIONS
For diabetes:     Think about dexcom trial to get a better sense for how the different doses are doing.    In the meantime, adjust insulin doses:    Basal Insulin  Take 20 units of levemir insulin in the morning, 15 units in the evening.    Check your glucose in the morning before breakfast and keep a log.  Goal AM glucose:       After 3-5 days, if your average glucose is above 140, increase your dose by 2 units  If your AM glucose is under 90, decrease by 2 units.     Meal-time insulin    Take 16 units of novolog with each meal (works best when taken about 5-10 minutes before you eat). If you skip a meal, skip this meal-time insulin.    This dose of insulin can be adjusted based on what your sugar is like at mealtime as well as what kind of meal you're about to eat:  If you're having a low-carb meal (like a salad), take 14 units instead  If you're having a high-carb meal (like pasta, with bread, and/or adding a pastry/dessert), take 18 units instead    This amount can then further be adjusted based on your pre-meal glucose:    Pre-meal glucose Adjustment to make   <70 Eat a meal   70-90 Decrease by 2 units    No change   150-249 Add 1 units   250-349 Add 2 units   350-449 Add 3 units   450+ Add 4 units

## 2023-02-08 NOTE — ASSESSMENT & PLAN NOTE
Complicated by hyperglycemia, sometimes hypoglycemia, neuropathy, CAD, CKD, pt also notes hx CVA, chart mentioned hx retinopathy.    Reviewed goals of therapy - to get the best control we can without hypoglycemia. Goal A1C <8 given age, comorbidities   last a1c remains above goal    Glucose reports still with wide variability. 50s-400s. No log, so unable to determine patterns.   Had recommended DM education, professional CGM, not done, pt not interested.    Medication changes:    Decrease PM basal insulin to 15 units   continue 20 units qAM lantus  Increase mealtime insulin.  Details in AVS    Reviewed patient's current insulin regimen. Clarified proper insulin dose and timing in relation to meals, etc. Insulin injection sites and proper rotation instructed.     -Advised frequent self blood glucose monitoring. Patient encouraged to document glucose results and bring them to every clinic visit    -Hypoglycemia precautions discussed. Instructed on precautions before driving.    -Close adherence to lifestyle changes recommended.    -Periodic follow ups for eye evaluations, foot care suggested.    Otherwise consider DPP4 vs GLP1 if covered okay. Options limited by CKD

## 2023-02-08 NOTE — ASSESSMENT & PLAN NOTE
Body mass index is 36.7 kg/m².   complicated by HTN, HLD, diabetes   encourage pt to work on healthy diet, increase exercise as tolerated.   monitor weight

## 2023-02-08 NOTE — PROGRESS NOTES
Subjective:      Chief Complaint: Diabetes and Osteoporosis      HPI: Indira Waters is a 77 y.o. female who is here for a follow-up evaluation for bones. Last seen 10/7/2022    With regards to osteoporosis:     Pt diagnosed with osteoporosis on bone density scan from: not sure when, pt reports having it for a while.  Fractures: pt denies fractures.  First bone density was: not sure  Last bone density: 7/14/2022. FRAX 37% major, 16.7% hip  Location BMD T-score Change?   Spine 0.821 -3 N/A   Total hip (L) 0.612 -3.1 N/A   Fem neck (R) 0.546 -3.5 N/A     Osteoporosis Risk Factor Assessment:    No   Yes  [x]    []  Prior use of hormone replacement therapy  []    [x]  Falls over the age of fifty  [x]    []  fractures to her wrist, hip or spine  [x]    []  loss of height of more than 1 1/2 - 2 inches   []    [x]  family history of osteoporosis, stooped posture, or fractures of hip.  []    [x]  Kidney stones, kidney disease  [x]    []  Thyroid disease  []    [x]  Medication exposure: steroids, anticoagulants, proton pump inhibitors or anti-seizure medications  []    [x]  Tobacco use, including use in the past. Pt reports she quit.  []    [x]  EtOH use, sometimes  [x]    []  Active malignancy, history of bone malignancy, or prior radiation treatment. Hx cancer, but denied radiation treatment    Has hx heart disease, stroke.    Last labs:    Lab Results   Component Value Date    CALCIUM 9.6 02/03/2023    ALBUMIN 3.6 12/05/2022    CREATININE 1.5 (H) 02/03/2023    [id number] 34 04/14/2022    PTH 30.1 12/05/2022     Treatment:  Osteoporosis medications used in the past: pt not sure, thinks she tried a lot of medications over the years. GI side effects.    Current medication: Forteo.  Start date: 10/2022    Vitamin intake:  Calcium: not sure how much  Vit D: not sure how much    Weight bearing exercise?  Not much    Today, pt reports feeling okay overall.    Symptoms  No   Yes  [x]    []  Falls or fractures  recently.  []    [x]  Dizziness, lightheadedness    Estimated Creatinine Clearance: 32.2 mL/min (A) (based on SCr of 1.5 mg/dL (H)).    With regards to the diabetes:  Diagnosed with T2DM since around    Hx DKA in , started insulin then and has remained on insulin    Known complications:     Retinopathy? Yes    Nephropathy? Yes    Neuropathy? Yes    CAD? Yes    CVA? Yes    Current regimen:   Basal insulin: levemir 20 units BID   Prandial insulin: novolog 14-16 units TIDWM    Missed doses? occasional    Prior treatments:    Pioglitazone   Januvia   70/30 insulin   Metformin    Self-monitored glucose  # times a day testin/day  Log reviewed: No    371 on the last blood test    AM: 200-300.  Later in the day: higher    Hypoglycemic events? Down to the 50s, with symptoms. Overnight mostly    Current Symptoms  No   Yes  []    [x]  Polydipsia  []    [x]  Polyuria  []    [x]  Vision changes  [x]    []  Nausea  [x]    []  Diarrhea  []    [x]  Weight gain, about 10 lbs  [x]    []  Weight loss  Neuropathy  Back, neck pains. Seeing pain management.   Decreased exercise    Diabetes Management Status    Statin: Not taking. On repatha.  ACE/ARB: Not taking    Screening or Prevention Patient's value Goal Complete/Controlled?   HgA1C Testing and Control   Lab Results   Component Value Date    HGBA1C 9.9 (H) 2023      q6months/Less than 8% No     Lipid profile : 2022 Annually Yes     LDL control Lab Results   Component Value Date    LDLCALC 91.0 2022    Annually/Less than 100 mg/dl  yes     Nephropathy screening Lab Results   Component Value Date    MICALBCREAT 65.8 (H) 2022     Lab Results   Component Value Date    CREATININE 1.5 (H) 2023     Lab Results   Component Value Date    PROTEINUA Negative 2022    Annually Yes     Blood pressure BP Readings from Last 1 Encounters:   23 (!) 137/56    Less than 140/90 Yes     Dilated retinal exam : 01/10/2023 Annually No     Foot exam    Most Recent Foot Exam Date: Not Found Annually No     Reviewed past medical, family, social history and updated as appropriate.    Review of Systems  As above    Objective:     Vitals:    02/08/23 1518   BP: (!) 137/56   Pulse: 82     BP Readings from Last 5 Encounters:   02/08/23 (!) 137/56   02/03/23 122/74   01/26/23 124/80   01/05/23 (!) 156/69   12/12/22 (!) 142/77     Physical Exam  Vitals reviewed.   Constitutional:       General: She is not in acute distress.     Appearance: She is obese.   Cardiovascular:      Heart sounds: Normal heart sounds.   Pulmonary:      Effort: Pulmonary effort is normal.   Musculoskeletal:         General: Swelling present.       Wt Readings from Last 10 Encounters:   02/08/23 1518 88.1 kg (194 lb 3.6 oz)   02/03/23 1118 88.1 kg (194 lb 3.2 oz)   01/26/23 1459 81.2 kg (179 lb)   01/04/23 2208 81.2 kg (179 lb)   12/12/22 1348 84.8 kg (187 lb)   12/07/22 1446 86.9 kg (191 lb 9.3 oz)   10/12/22 1344 84 kg (185 lb 3 oz)   10/07/22 1027 84.4 kg (186 lb)   09/29/22 1509 86.3 kg (190 lb 4.8 oz)   09/27/22 1336 82.6 kg (182 lb)     Lab Results   Component Value Date    HGBA1C 9.9 (H) 02/03/2023     Lab Results   Component Value Date    CHOL 171 09/30/2022    HDL 38 (L) 09/30/2022    LDLCALC 91.0 09/30/2022    TRIG 210 (H) 09/30/2022    CHOLHDL 22.2 09/30/2022     Lab Results   Component Value Date     02/03/2023    K 4.5 02/03/2023     02/03/2023    CO2 26 02/03/2023     (H) 02/03/2023    BUN 19 02/03/2023    CREATININE 1.5 (H) 02/03/2023    CALCIUM 9.6 02/03/2023    PROT 7.0 12/05/2022    ALBUMIN 3.6 12/05/2022    BILITOT 0.2 12/05/2022    ALKPHOS 69 12/05/2022    AST 23 12/05/2022    ALT 23 12/05/2022    ANIONGAP 9 02/03/2023    ESTGFRAFRICA 42 (A) 07/14/2022    EGFRNONAA 37 (A) 07/14/2022    TSH 2.432 09/30/2022        Assessment/Plan:     Type 2 diabetes mellitus with hyperglycemia  Complicated by hyperglycemia, sometimes hypoglycemia, neuropathy, CAD, CKD, pt also  notes hx CVA, chart mentioned hx retinopathy.    Reviewed goals of therapy - to get the best control we can without hypoglycemia. Goal A1C <8 given age, comorbidities   last a1c remains above goal    Glucose reports still with wide variability. 50s-400s. No log, so unable to determine patterns.   Had recommended DM education, professional CGM, not done, pt not interested.    Medication changes:    Decrease PM basal insulin to 15 units   continue 20 units qAM lantus  Increase mealtime insulin.  Details in AVS    Reviewed patient's current insulin regimen. Clarified proper insulin dose and timing in relation to meals, etc. Insulin injection sites and proper rotation instructed.     -Advised frequent self blood glucose monitoring. Patient encouraged to document glucose results and bring them to every clinic visit    -Hypoglycemia precautions discussed. Instructed on precautions before driving.    -Close adherence to lifestyle changes recommended.    -Periodic follow ups for eye evaluations, foot care suggested.    Otherwise consider DPP4 vs GLP1 if covered okay. Options limited by CKD    Age-related osteoporosis without current pathological fracture  Risks include  race, menopause, hx smoking, ETOH,  +FH, PPI therapy    Recommend pt have sufficient calcium (2239-5304 mg/day in divided doses) and vitamin D (2000 units a day), calcium from food sources preferred but OTC supplementation okay if needed to reach goal amounts.  Fall precautions emphasized  Encourage weight bearing exercises as tolerated    Treatment options limited by CKD. And comorbidities.  With how low BMD is, and how high FRAX is, recommended anabolic agent. To build up bone density a bit, then use other agent after that to maintain bone density at that higher level     avoid evenity with cardiac/stroke hx.  Went with forteo. Tolerating okay   continue   prolia after finishing.      Class 2 severe obesity with serious comorbidity and body mass  index (BMI) of 36.0 to 36.9 in adult  Body mass index is 36.7 kg/m².   complicated by HTN, HLD, diabetes   encourage pt to work on healthy diet, increase exercise as tolerated.   monitor weight      Follow up in about 3 months (around 5/8/2023) for further monitoring, lab review.      Adeel Tanner MD  Endocrinology

## 2023-02-08 NOTE — PROGRESS NOTES
Subjective:      Chief Complaint: No chief complaint on file.      HPI: Indira Waters is a 77 y.o. female who is here for a follow-up evaluation for bones. Last seen 10/7/2022    With regards to osteoporosis:     Pt diagnosed with osteoporosis on bone density scan from: not sure when, pt reports having it for a while.  Fractures: pt denies fractures.  First bone density was: not sure  Last bone density: 7/14/2022. FRAX 37% major, 16.7% hip  Location BMD T-score Change?   Spine 0.821 -3 N/A   Total hip (L) 0.612 -3.1 N/A   Fem neck (R) 0.546 -3.5 N/A     Osteoporosis Risk Factor Assessment:    No   Yes  [x]    []  Prior use of hormone replacement therapy  []    [x]  Falls over the age of fifty  [x]    []  fractures to her wrist, hip or spine  [x]    []  loss of height of more than 1 1/2 - 2 inches   []    [x]  family history of osteoporosis, stooped posture, or fractures of hip.  []    [x]  Kidney stones, kidney disease  [x]    []  Thyroid disease  []    [x]  Medication exposure: steroids, anticoagulants, proton pump inhibitors or anti-seizure medications  []    [x]  Tobacco use, including use in the past. Pt reports she quit.  []    [x]  EtOH use, sometimes  [x]    []  Active malignancy, history of bone malignancy, or prior radiation treatment. Hx cancer, but denied radiation treatment    Has hx heart disease, stroke.    Last labs:    Lab Results   Component Value Date    CALCIUM 9.6 02/03/2023    ALBUMIN 3.6 12/05/2022    CREATININE 1.5 (H) 02/03/2023    [id number] 34 04/14/2022    PTH 30.1 12/05/2022     Treatment:  Osteoporosis medications used in the past: pt not sure, thinks she tried a lot of medications over the years. GI side effects.    Current medication: Forteo.  Start date: 10/2022      Vitamin intake:  Calcium: not sure how much  Vit D: not sure how much    Weight bearing exercise?  Not much    Today, pt reports feeling okay overall.    Symptoms  No   Yes  []    [x]  Falls or fractures  recently. No fractures.  []    [x]  Dizziness, lightheadedness    Estimated Creatinine Clearance: 32.2 mL/min (A) (based on SCr of 1.5 mg/dL (H)).    With regards to the diabetes:  Diagnosed with T2DM since around 2015   Hx DKA in 2015, started insulin then and has remained on insulin    Known complications:     Retinopathy? Yes    Nephropathy? Yes    Neuropathy? Yes    CAD? Yes    CVA? Yes    Current regimen:   Basal insulin: levemir 16 units BID   Prandial insulin: novolog 14-16 units TIDWM    Missed doses? occasional    Prior treatments:    Pioglitazone   Januvia   70/30 insulin   Metformin    Self-monitored glucose  # times a day testing: 3-4/day  Log reviewed: No    176 this morning recently.    400s often  Other times 100s.    Hypoglycemic events? 1/week lately. Down to 50s.    Current Symptoms  No   Yes  []    [x]  Polydipsia  []    [x]  Polyuria  []    [x]  Vision changes  [x]    []  Nausea  [x]    []  Diarrhea  [x]    []  Weight gain  [x]    []  Weight loss  Neuropathy  Back, neck pains. Seeing pain management.    Diabetes Management Status    Statin: Not taking. On repatha.  ACE/ARB: Not taking    Screening or Prevention Patient's value Goal Complete/Controlled?   HgA1C Testing and Control   Lab Results   Component Value Date    HGBA1C 9.9 (H) 02/03/2023      q6months/Less than 8% No     Lipid profile : 09/30/2022 Annually Yes     LDL control Lab Results   Component Value Date    LDLCALC 91.0 09/30/2022    Annually/Less than 100 mg/dl  yes     Nephropathy screening Lab Results   Component Value Date    MICALBCREAT 65.8 (H) 09/30/2022     Lab Results   Component Value Date    CREATININE 1.5 (H) 02/03/2023     Lab Results   Component Value Date    PROTEINUA Negative 12/05/2022    Annually Yes     Blood pressure BP Readings from Last 1 Encounters:   02/08/23 (!) 137/56    Less than 140/90 Yes     Dilated retinal exam : 01/10/2023 Annually yes     Foot exam   Most Recent Foot Exam Date: Not Found Annually No      Reviewed past medical, family, social history and updated as appropriate.    Review of Systems  As above    Objective:     Vitals:    02/08/23 1518   BP: (!) 137/56   Pulse: 82     BP Readings from Last 5 Encounters:   02/08/23 (!) 137/56   02/03/23 122/74   01/26/23 124/80   01/05/23 (!) 156/69   12/12/22 (!) 142/77     Physical Exam  Vitals reviewed.   Constitutional:       General: She is not in acute distress.     Appearance: She is obese.   Cardiovascular:      Heart sounds: Normal heart sounds.   Pulmonary:      Effort: Pulmonary effort is normal.   Musculoskeletal:         General: Swelling present.       Wt Readings from Last 10 Encounters:   02/08/23 1518 88.1 kg (194 lb 3.6 oz)   02/03/23 1118 88.1 kg (194 lb 3.2 oz)   01/26/23 1459 81.2 kg (179 lb)   01/04/23 2208 81.2 kg (179 lb)   12/12/22 1348 84.8 kg (187 lb)   12/07/22 1446 86.9 kg (191 lb 9.3 oz)   10/12/22 1344 84 kg (185 lb 3 oz)   10/07/22 1027 84.4 kg (186 lb)   09/29/22 1509 86.3 kg (190 lb 4.8 oz)   09/27/22 1336 82.6 kg (182 lb)     Lab Results   Component Value Date    HGBA1C 9.9 (H) 02/03/2023     Lab Results   Component Value Date    CHOL 171 09/30/2022    HDL 38 (L) 09/30/2022    LDLCALC 91.0 09/30/2022    TRIG 210 (H) 09/30/2022    CHOLHDL 22.2 09/30/2022     Lab Results   Component Value Date     02/03/2023    K 4.5 02/03/2023     02/03/2023    CO2 26 02/03/2023     (H) 02/03/2023    BUN 19 02/03/2023    CREATININE 1.5 (H) 02/03/2023    CALCIUM 9.6 02/03/2023    PROT 7.0 12/05/2022    ALBUMIN 3.6 12/05/2022    BILITOT 0.2 12/05/2022    ALKPHOS 69 12/05/2022    AST 23 12/05/2022    ALT 23 12/05/2022    ANIONGAP 9 02/03/2023    ESTGFRAFRICA 42 (A) 07/14/2022    EGFRNONAA 37 (A) 07/14/2022    TSH 2.432 09/30/2022        Assessment/Plan:     No problem-specific Assessment & Plan notes found for this encounter.        40 minutes of total time spent on the encounter, which includes face to face time and non-face to face  time preparing to see the patient (eg, review of tests), Obtaining and/or reviewing separately obtained history, Documenting clinical information in the electronic or other health record, Independently interpreting results (not separately reported) and communicating results to the patient/family/caregiver, or Care coordination (not separately reported).      No follow-ups on file.      Adeel Tanner MD  Endocrinology

## 2023-02-08 NOTE — ASSESSMENT & PLAN NOTE
Risks include  race, menopause, hx smoking, ETOH,  +FH, PPI therapy    Recommend pt have sufficient calcium (2346-9352 mg/day in divided doses) and vitamin D (2000 units a day), calcium from food sources preferred but OTC supplementation okay if needed to reach goal amounts.  Fall precautions emphasized  Encourage weight bearing exercises as tolerated    Treatment options limited by CKD. And comorbidities.  With how low BMD is, and how high FRAX is, recommended anabolic agent. To build up bone density a bit, then use other agent after that to maintain bone density at that higher level     avoid evenity with cardiac/stroke hx.  Went with forteo. Tolerating okay   continue   prolia after finishing.

## 2023-02-09 ENCOUNTER — TELEPHONE (OUTPATIENT)
Dept: PAIN MEDICINE | Facility: CLINIC | Age: 78
End: 2023-02-09
Payer: MEDICARE

## 2023-02-10 ENCOUNTER — OFFICE VISIT (OUTPATIENT)
Dept: PAIN MEDICINE | Facility: CLINIC | Age: 78
End: 2023-02-10
Payer: MEDICARE

## 2023-02-10 ENCOUNTER — SPECIALTY PHARMACY (OUTPATIENT)
Dept: PHARMACY | Facility: CLINIC | Age: 78
End: 2023-02-10
Payer: MEDICARE

## 2023-02-10 VITALS
DIASTOLIC BLOOD PRESSURE: 85 MMHG | HEART RATE: 85 BPM | HEIGHT: 61 IN | BODY MASS INDEX: 36.7 KG/M2 | SYSTOLIC BLOOD PRESSURE: 176 MMHG

## 2023-02-10 DIAGNOSIS — E13.42 POLYNEUROPATHY DUE TO SECONDARY DIABETES: ICD-10-CM

## 2023-02-10 DIAGNOSIS — G89.4 CHRONIC PAIN DISORDER: ICD-10-CM

## 2023-02-10 DIAGNOSIS — E08.42 DIABETIC POLYNEUROPATHY ASSOCIATED WITH DIABETES MELLITUS DUE TO UNDERLYING CONDITION: ICD-10-CM

## 2023-02-10 DIAGNOSIS — E11.42 DIABETIC PERIPHERAL NEUROPATHY: Primary | ICD-10-CM

## 2023-02-10 DIAGNOSIS — G89.4 CHRONIC PAIN SYNDROME: ICD-10-CM

## 2023-02-10 PROCEDURE — 99215 OFFICE O/P EST HI 40 MIN: CPT | Mod: S$PBB,,, | Performed by: NURSE PRACTITIONER

## 2023-02-10 PROCEDURE — 99999 PR PBB SHADOW E&M-EST. PATIENT-LVL IV: ICD-10-PCS | Mod: PBBFAC,,, | Performed by: NURSE PRACTITIONER

## 2023-02-10 PROCEDURE — 99999 PR PBB SHADOW E&M-EST. PATIENT-LVL IV: CPT | Mod: PBBFAC,,, | Performed by: NURSE PRACTITIONER

## 2023-02-10 PROCEDURE — 99215 PR OFFICE/OUTPT VISIT, EST, LEVL V, 40-54 MIN: ICD-10-PCS | Mod: S$PBB,,, | Performed by: NURSE PRACTITIONER

## 2023-02-10 PROCEDURE — 99214 OFFICE O/P EST MOD 30 MIN: CPT | Mod: PBBFAC | Performed by: NURSE PRACTITIONER

## 2023-02-10 NOTE — TELEPHONE ENCOUNTER
Specialty Pharmacy - Refill Coordination    Specialty Medication Orders Linked to Encounter      Flowsheet Row Most Recent Value   Medication #1 evolocumab (REPATHA SURECLICK) 140 mg/mL PnIj (Order#[id number], Rx#[id number])            Refill Questions - Documented Responses      Flowsheet Row Most Recent Value   Patient Availability and HIPAA Verification    Does patient want to proceed with activity? Yes   HIPAA/medical authority confirmed? Yes   Relationship to patient of person spoken to? Child   Refill Screening Questions    Would patient like to speak to a pharmacist? No   When does the patient need to receive the medication? 02/15/23   Refill Delivery Questions    How will the patient receive the medication? [facility]   When does the patient need to receive the medication? 02/15/23   Shipping Address Home   Address in [facility] confirmed and updated if neccessary? Yes   Expected Copay ($) 4.3   Is the patient able to afford the medication copay? Yes   Payment Method CC on file   Days supply of Refill 28   Supplies needed? No supplies needed   Refill activity completed? Yes   Refill activity plan Refill scheduled   Shipment/Pickup Date: 02/13/23            Current Outpatient Medications   Medication Sig    acetaminophen (TYLENOL) 500 MG tablet Take 2 tablets (1,000 mg total) by mouth every 8 (eight) hours as needed for Pain.    allopurinoL (ZYLOPRIM) 300 MG tablet Take 1 tablet (300 mg total) by mouth once daily.    aspirin (ECOTRIN) 81 MG EC tablet Take 1 tablet (81 mg total) by mouth once daily.    atorvastatin (LIPITOR) 40 MG tablet atorvastatin 40 mg tablet    blood sugar diagnostic Strp 1 each by Misc.(Non-Drug; Combo Route) route 4 (four) times daily.    blood-glucose meter Misc Follow package directions (Patient taking differently: Follow package directions)    blood-glucose meter,continuous (DEXCOM G6 ) Misc 1 each by Misc.(Non-Drug; Combo Route) route continuous prn.    blood-glucose  sensor (DEXCOM G6 SENSOR) Nicole 3 each by Misc.(Non-Drug; Combo Route) route continuous prn. Change every 10 days.    blood-glucose transmitter (DEXCOM G6 TRANSMITTER) Nicole 1 each by Misc.(Non-Drug; Combo Route) route continuous prn.    cloNIDine (CATAPRES) 0.1 MG tablet Take 1 tablet (0.1 mg total) by mouth 2 (two) times daily.    evolocumab (REPATHA SURECLICK) 140 mg/mL PnIj Inject 1 mL (140 mg total) into the skin every 14 (fourteen) days.    fenofibrate micronized (LOFIBRA) 134 MG Cap Take 1 capsule (134 mg total) by mouth daily with breakfast.    furosemide (LASIX) 40 MG tablet Take 1 tablet (40 mg total) by mouth once daily.    HYDROcodone-acetaminophen (NORCO)  mg per tablet Take 1 tablet by mouth every 8 (eight) hours as needed for Pain.    hydrocortisone (ANUSOL-HC) 2.5 % rectal cream Procto-Med HC 2.5 % topical cream perineal applicator    insulin aspart U-100 (NOVOLOG) 100 unit/mL (3 mL) InPn pen Inject 12 Units into the skin 3 (three) times daily with meals. Plus correction scale. Max TDD 40units.    insulin detemir U-100 (LEVEMIR FLEXTOUCH U-100 INSULN) 100 unit/mL (3 mL) InPn pen Inject 20 Units into the skin 2 (two) times daily.    ipratropium-albuteroL (COMBIVENT)  mcg/actuation inhaler Inhale 1 puff into the lungs by mouth every 6 (six) hours.    losartan (COZAAR) 50 MG tablet Take 1 tablet (50 mg total) by mouth once daily.    methocarbamoL (ROBAXIN) 500 MG Tab Take 1 tablet (500 mg total) by mouth 3 (three) times daily as needed (muscle spasm).    metoclopramide HCl (REGLAN) 5 MG tablet Take 1 tablet (5 mg total) by mouth 4 (four) times daily as needed (nausea, prevention).    metoprolol succinate (TOPROL-XL) 100 MG 24 hr tablet Take 1 tablet (100 mg total) by mouth once daily.    NIFEdipine (PROCARDIA-XL) 30 MG (OSM) 24 hr tablet Take 1 tablet (30 mg total) by mouth 2 (two) times a day.    nitroGLYCERIN (NITROSTAT) 0.4 MG SL tablet Place 1 tablet (0.4 mg total) under the tongue every 5  "(five) minutes as needed for Chest pain.    pantoprazole (PROTONIX) 40 MG tablet Take 1 tablet (40 mg total) by mouth once daily.    pen needle, diabetic (BD ULTRA-FINE HIEN PEN NEEDLE) 32 gauge x 5/32" Ndle 1 each by Misc.(Non-Drug; Combo Route) route 4 (four) times daily.    pregabalin (LYRICA) 150 MG capsule Take 1 capsule (150 mg total) by mouth 3 (three) times daily.    senna-docusate 8.6-50 mg (PERICOLACE) 8.6-50 mg per tablet Take 1 tablet by mouth 2 (two) times daily as needed for Constipation.    sertraline (ZOLOFT) 100 MG tablet Take 1 tablet (100 mg total) by mouth once daily.    sulfamethoxazole-trimethoprim 800-160mg (BACTRIM DS) 800-160 mg Tab sulfamethoxazole 800 mg-trimethoprim 160 mg tablet    teriparatide 20 mcg/dose (620mcg/2.48mL) PnIj Inject 20 mcg into the skin once daily.    triamcinolone acetonide 0.1% (KENALOG) 0.1 % cream Apply to affected areas of body twice daily as needed rash. Do not use on face, underarms, or groin.    TRUEPLUS LANCETS 30 gauge Misc TEST AS DIRECTED FOUR TIMES DAILY   Last reviewed on 2/8/2023  3:35 PM by Adeel Tanner MD    Review of patient's allergies indicates:   Allergen Reactions    Bleach (sodium hypochlorite) Shortness Of Breath    Nitrofurantoin macrocrystalline Anaphylaxis    Lipitor [atorvastatin] Diarrhea and Rash    Nsaids (non-steroidal anti-inflammatory drug)     Pcn [penicillins]     Toradol [ketorolac]     Last reviewed on  2/10/2023 1:28 PM by Joyce Austin      Tasks added this encounter   3/8/2023 - Refill Call (Auto Added)   Tasks due within next 3 months   No tasks due.     [facility]  [address] [place] [id number]  Phone: [phone]  Fax: [phone]        "

## 2023-02-10 NOTE — PROGRESS NOTES
Chronic patient Established Note (Follow up visit)    Interval History 2/10/2023:  Mrs Waters presents for follow up of chronic lower back painn and radicular pain in conjunction with PDN to bilateral feet. She is doing fair with medication mgt of Norco, Robaxin, and Lyrica and does need refill at this time. She denies SE of medications. She is also here for Qutenza application today. She is scheduled to have upcoming SCS trial with [facility] to aslo address her PDN.     Interval History 12/13/2022:  Mrs Waters presents for follow up of chronic pain. She is ready to repeat Qutenza application as it has been >91 days and she had significant improvement of symptoms of PDN. She recently had repeat A1C and just above 10.0 but is decreasing. She continues to take medication with benefit and denies SE of medication. Medication regimen include Norco 10/325mg TID, Robaxin 500mg and Lyrica 150mg.     Interval History 10/12/2022:  Mrs Waters presents for follow up of chronic neuropathy. She is s/p qutenza patch placement with improved functioning and neuropathy control. Unfortunately her A1C was above 11 which inhibits her from Nevro SCS trial at this time. She is eager to have SCS trial. She denies new areas of pain or neurological changes. She continued to take  Norco 10/325mg TID, Robaxin 500mg and Lyrica 150mg TID with benefit and denies significant SE of medications.     Interval History 9/8/2022:  Mrs Waters presents for follow up of chronic lower back painn and radicular pain in conjunction with PDN to bilateral feet. She is doing fair with medication mgt of Norco, Robaxin, and Lyrica and does need refill at this time. She denies SE of medications. She is patiently awaiting her A1C to become lower than 10 to proceed with SCS trial.     Interval History 8/12/2022:  Mrs Waters presents for delayed FU. She has had continuous pain to lumbar and radicular pain but also peripheral neuropathy complaints. Over interval she has had CVA  but doing fair. Her A1C has unfortunately elevated above 10 at this time and SCS trial placed on hold but phychiatric evaluation complete. She continues to take Norco 10/325mg TID, Robaxin 500mg TID and Lyrica 150mg TID with some benefit and denies SE of medications. No s/s concerning for cauda equina.     Interval History 4/12/2022:  Patient returns to clinic today for follow-up. Her neuropathic pain continues to be an issue for her, but she says that she is able to manage. She is taking Norco 10-325mg TID, Robaxin 500mg TID, and Lyrica 150mg TID without any adverse side effects. She denies any changes in her symptoms. She would like to proceed with SCS trial. Discussed last time that her HbA1c should be <10, was 9.8 on most recent labs. Seen by psychology and cleared for SCS.     Interval History 2/14/2022:  Mrs Waters presents for follow up. Pt states overall neuropathy continues. Since last visit she has been stable with medication mgt and takign Norco 10/325mg TID, Robaxin 500mg TID and Lyrica 150mg TID. She denies new areas of pain or neurological changes. Medication adequate to control pain without adverse SE.      Interval History 12/21/2021:  Pt presents for urgent evaluation s/p fall in kitchen last night. Pt unsure of LOC or head trauma but states some amnesia of events. Pt is having left knee pain, B ankle pain L>R and lower back/buttock pain. Daughter further states tremor like activity last night. During visit daughter asked for bedside commode due to inability to ambulate.  Pt during visit appeared somnolent, pale and began vomiting. Discussed going to ER for further evaluation.      Interval History 12/14/2021:  Mrs Waters presents for follow up of chronic pain complaints. She is hopeful she will have A1C lower soon to move forward with SCS. She is doing fair with medication mgt alone at this time and denies SE of medications. Pt is currently taking Norco 10/325mg TID PRN, Lyrica 150mg TID, and Robaxin  500mg TID. She denies new areas of pain or neurological changes.      Interval History 10/14/2021:  The Pt presents for follow up and s/p B Lumbar sympathetic blocks. Pt states this has done well but ready to proceed with SCS trial. She is aware A1C must be under tight control and Pt and daughter re-iterate knowing this. Pt also mentions DKA admission and diagnosis of vasculitis as well over interval. Pt continues to take hydrocodone 10/325 mg TID PRN, Lyrica 150 mg TID, and Robaxin 500mg TID. She denies any SE of medication, denies new neurological changes.      Interval Hx 09/16/2021  Indira Waters presents to the clinic for a follow-up appointment for f/u after bilateral lumbar sympathetic block on 8/25/21.Patient reports >50% relief after lumbar sympathetic block that lasted 2 weeks when she then developed a UTI/DKA, was admitted to the hospital and pain recurred.  Current pain intensity is 8/10.     Interval History 8/12/2021:  Indira Waters presents to the clinic for a follow-up appointment for BLE diabetic neuropathy, painful. Continues with Norco 10/325mg, Lyrica 150mg TID, Robaxin 750mg daily PRN. She had to cancel previously scheduled lumbar sympathetic block 2/2 lithotripsy for painful kidney stones, which have now passed. She still has intermittent pain with urination but overall that pain is improving. She had to cancel previous angiogram for this same reason - this has not been rescheduled yet. She denies any change in her LE pain. Denies any new neurological sxs in BLEs.     Interval History 6/10/21:  Indira Waters presents to the clinic for a 2 week follow-up appointment from lumbar sympathetic nerve block in early May. She reports minimal relief from this intervention, but did note that it helped some, briefly. Since the last visit, Indira Waters states the pain has been persistant. Current pain intensity is 10/10. Patient has chronic generalized diffuse pain, most pronounced in her BL  "lower extremities 2/2 diabetic neuropathy. HSe states that the pain is a little better than at her last visit. Most days are 10/10, but some days are better than others. Her pain is aggravated by exertion, walking, or sitting/standing for extended periods of time. He pain is mildly improved by rest and medications. She is currently taking Lyrica 150 PO TID, Zoloft, and Norco 10-325mg TID PRN. She states that the pain meds are helping but she is still constantly in pain and unable to participate in her ADLs. She has now established care with PCP for assistance w/ poorly controlled T2DM, last A1c 11.4. She has not yet established care w/ Rheumatology for fibromyalgia management.    Interval HPI 4/15/21:  Patient returns for follow up of chronic generalized diffuse pain. At the last visit, had EMG (not yet completed), lumbar and hip x-ray imaging ordered. She was also referred to Rheumatology for fibromyalgia management and referral to PCP for DM2 management and weaning of zoloft for transition to cymbalta for treatment of neuropathy. She had her Lyrica increased to 150mg PO TID, and prescribed Norco 10mg TID with opioid contract signed. She has been taking Norco 10mg TID and it has been helping her "bad" pains but does not take all of her pain away. She has not yet been set up with her new PCP or rheumatologist yet for fibromyalgia. Still continues to have generalized pain everywhere including feet, hips, legs, lower and upper back, neck and shoulder pain. Continues to have neuropathy in the bilateral lower extremities due to uncontrolled DM2.    Initial Visit 3/11/21:  Indira Waters presents to the clinic for the evaluation of chronic pain. Complaining of pain everywhere including feet, legs, hips, lower and upper back, neck, shoulder. Pain started 5+ years ago. Pain 10/10 at worse. She was referred here by her PCP Dr. Ramos who she has been seeing for over 6 years. She states her pain is aggravated by any physical " activity/movement. She takes lyrica, robaxin, and Norco 10 TID with mild relief of pain. Per patient, she was referred here by her PCP for medication refill. She has not tried physical therapy for several years, she states it did not help last time she was in PT. She says she is trying to exercise daily by doing yoga. She walks with a walker. She has numerous comorbidities including DM2 (Last A1C 9+ per prior family medicine note in Jan 2021), fibromyalgia, lupus, DDD. She states she would like to find a new PCP as she no longer lives near her old one. Patient denies night fever/night sweats, urinary incontinence, bowel incontinence and significant weight loss.      Pain Disability Index Review:  Last 3 PDI Scores 3/16/2023 2/10/2023 10/12/2022   Pain Disability Index (PDI) 63 50 40     Pain Medications:  Norco 10-325mg TID, Robaxin 500mg TID, and Lyrica 150mg TID    Opioid Contract: yes     report:  Reviewed and consistent with medication use as prescribed.    Pain Procedures:    - reports possible back injection 10+ years ago  - Lumbar sympathetic nerve block 05/05/21 - Dr. Pereira - minimal relief    Physical Therapy/Home Exercise: no     Imaging:   Hip X-ray 4/7/21  FINDINGS:  Osseous structures appear intact without evidence of fracture or osseous destructive process.  No apparent dislocation.     Modest degenerative change or significant joint space narrowing.     Lumbar X-ray 4/7/21  FINDINGS:  Diffuse bony demineralization.  Vertebral bodies are normal in height without evidence of fracture.  No pars defects.     Normal sagittal alignment is preserved.  No spondylolisthesis. No abnormal translation with flexion and extension.     Intervertebral disc heights are well maintained.  Mild facet arthropathy in the lower lumbar spine.     Mild scattered vascular calcification.     Impression:     No evidence of fracture or malalignment.     Mild facet arthropathy in the lower lumbar spine.     Diffuse bony  demineralization.  Consider correlation with DEXA.    Allergies:   Review of patient's allergies indicates:   Allergen Reactions    Bleach (sodium hypochlorite) Shortness Of Breath    Nitrofurantoin macrocrystalline Anaphylaxis    Lipitor [atorvastatin] Diarrhea and Rash    Nsaids (non-steroidal anti-inflammatory drug)     Pcn [penicillins]     Toradol [ketorolac]        Current Medications:   Current Outpatient Medications   Medication Sig Dispense Refill    acetaminophen (TYLENOL) 500 MG tablet Take 2 tablets (1,000 mg total) by mouth every 8 (eight) hours as needed for Pain.  0    allopurinoL (ZYLOPRIM) 300 MG tablet Take 1 tablet (300 mg total) by mouth once daily. 90 tablet 1    aspirin (ECOTRIN) 81 MG EC tablet Take 1 tablet (81 mg total) by mouth once daily. 30 tablet 0    atorvastatin (LIPITOR) 40 MG tablet atorvastatin 40 mg tablet      blood sugar diagnostic Strp 1 each by Misc.(Non-Drug; Combo Route) route 4 (four) times daily. 200 each 0    blood-glucose meter Misc Follow package directions (Patient taking differently: Follow package directions) 1 each 0    blood-glucose meter,continuous (DEXCOM G6 ) Misc 1 each by Misc.(Non-Drug; Combo Route) route continuous prn. 1 each PRN    blood-glucose sensor (DEXCOM G6 SENSOR) Nicole 3 each by Misc.(Non-Drug; Combo Route) route continuous prn. Change every 10 days. 3 each PRN    blood-glucose transmitter (DEXCOM G6 TRANSMITTER) Nicole 1 each by Misc.(Non-Drug; Combo Route) route continuous prn. 1 each PRN    cloNIDine (CATAPRES) 0.1 MG tablet Take 1 tablet (0.1 mg total) by mouth 2 (two) times daily. 180 tablet 0    evolocumab (REPATHA SURECLICK) 140 mg/mL PnIj Inject 1 mL (140 mg total) into the skin every 14 (fourteen) days. 8 each 3    fenofibrate micronized (LOFIBRA) 134 MG Cap Take 1 capsule (134 mg total) by mouth daily with breakfast. 90 capsule 3    furosemide (LASIX) 40 MG tablet Take 1 tablet (40 mg total) by mouth once daily. 30 tablet 6     "HYDROcodone-acetaminophen (NORCO)  mg per tablet Take 1 tablet by mouth every 8 (eight) hours as needed for Pain. 90 tablet 0    hydrocortisone (ANUSOL-HC) 2.5 % rectal cream Procto-Med HC 2.5 % topical cream perineal applicator      insulin aspart U-100 (NOVOLOG) 100 unit/mL (3 mL) InPn pen Inject 12 Units into the skin 3 (three) times daily with meals. Plus correction scale. Max TDD 40units. 15 mL 3    insulin detemir U-100, Levemir, (LEVEMIR FLEXPEN) 100 unit/mL (3 mL) InPn pen Inject 20 units into the skin 2 (two) times daily. 15 mL 0    insulin detemir U-100, Levemir, (LEVEMIR FLEXTOUCH U-100 INSULN) 100 unit/mL (3 mL) InPn pen Inject 20 Units into the skin 2 (two) times daily. 15 mL 3    ipratropium-albuteroL (COMBIVENT)  mcg/actuation inhaler Inhale 1 puff into the lungs by mouth every 6 (six) hours. 4 g 0    losartan (COZAAR) 50 MG tablet Take 1 tablet (50 mg total) by mouth once daily. 90 tablet 0    methocarbamoL (ROBAXIN) 500 MG Tab Take 1 tablet (500 mg total) by mouth 3 (three) times daily as needed (muscle spasm). 90 tablet 2    metoclopramide HCl (REGLAN) 5 MG tablet Take 1 tablet (5 mg total) by mouth 4 (four) times daily as needed (nausea, prevention). 30 tablet 3    metoprolol succinate (TOPROL-XL) 100 MG 24 hr tablet Take 1 tablet (100 mg total) by mouth once daily. 90 tablet 3    NIFEdipine (PROCARDIA-XL) 30 MG (OSM) 24 hr tablet Take 1 tablet (30 mg total) by mouth 2 (two) times a day. 180 tablet 0    nitroGLYCERIN (NITROSTAT) 0.4 MG SL tablet Place 1 tablet (0.4 mg total) under the tongue every 5 (five) minutes as needed for Chest pain. 25 tablet 5    pantoprazole (PROTONIX) 40 MG tablet Take 1 tablet (40 mg total) by mouth once daily. 30 tablet 0    pen needle, diabetic (BD ULTRA-FINE HIEN PEN NEEDLE) 32 gauge x 5/32" Ndle 1 each by Misc.(Non-Drug; Combo Route) route 4 (four) times daily. 400 each 3    pregabalin (LYRICA) 150 MG capsule Take 1 capsule (150 mg total) by mouth 3 " (three) times daily. 90 capsule 2    senna-docusate 8.6-50 mg (PERICOLACE) 8.6-50 mg per tablet Take 1 tablet by mouth 2 (two) times daily as needed for Constipation. 60 tablet 0    sertraline (ZOLOFT) 100 MG tablet Take 1 tablet (100 mg total) by mouth once daily. 90 tablet 3    sulfamethoxazole-trimethoprim 800-160mg (BACTRIM DS) 800-160 mg Tab sulfamethoxazole 800 mg-trimethoprim 160 mg tablet      teriparatide 20 mcg/dose (620mcg/2.48mL) PnIj Inject 20 mcg into the skin once daily. 2.48 mL 11    triamcinolone acetonide 0.1% (KENALOG) 0.1 % cream Apply to affected areas of body twice daily as needed rash. Do not use on face, underarms, or groin. 454 g 0    TRUEPLUS LANCETS 30 gauge Misc TEST AS DIRECTED FOUR TIMES DAILY 200 each 2     No current facility-administered medications for this visit.       REVIEW OF SYSTEMS:    GENERAL:  No weight loss, malaise or fevers.  HEENT:  Negative for frequent or significant headaches.  NECK:  Negative for lumps, goiter, pain and significant neck swelling.  RESPIRATORY:  Negative for cough, wheezing or shortness of breath.  CARDIOVASCULAR:  Negative for chest pain, leg swelling or palpitations.  GI:  Negative for abdominal discomfort, blood in stools or black stools or change in bowel habits.  MUSCULOSKELETAL:  See HPI.  SKIN:  Negative for lesions, rash, and itching.  PSYCH:  Negative for sleep disturbance, mood disorder and recent psychosocial stressors.  HEMATOLOGY/LYMPHOLOGY:  Negative for prolonged bleeding, bruising easily or swollen nodes.  NEURO:   No history of headaches, syncope, paralysis, seizures or tremors.  All other reviewed and negative other than HPI.    Past Medical History:  Past Medical History:   Diagnosis Date    Arthritis     Back pain     Cancer     ovarian    Cervical cancer     Depression     Diabetes mellitus     Fibromyalgia     Heart attack     Hyperlipidemia     Hypertension     Lupus     Stroke     slight left sided weakness       Past Surgical  History:  Past Surgical History:   Procedure Laterality Date    APPENDECTOMY       SECTION      2    CORONARY ANGIOGRAPHY N/A 2022    Procedure: ANGIOGRAM, CORONARY ARTERY;  Surgeon: Jose L Méndez MD;  Location: [facility] CATH LAB;  Service: Cardiology;  Laterality: N/A;    HYSTERECTOMY      with USO for cervical cancer    INJECTION OF ANESTHETIC AGENT AROUND NERVE Bilateral 2021    Procedure: BLOCK, NERVE, SYMPATHIC;  Surgeon: Holden Pereira MD;  Location: [facility] PAIN MGT;  Service: Pain Management;  Laterality: Bilateral;    INJECTION OF ANESTHETIC AGENT AROUND NERVE N/A 2021    Procedure: BLOCK, NERVE, SYMPATHETIC  need consent;  Surgeon: Holden Pereira MD;  Location: [facility] PAIN MGT;  Service: Pain Management;  Laterality: N/A;    YARED LINK,SWALLOW FUNCTION,CINE/VIDEO RECORD  2021         TONSILLECTOMY      TRIAL OF SPINAL CORD NERVE STIMULATOR N/A 3/8/2023    Procedure: LUMBAR SPINAL CORD STIMULATOR TRIAL NEVRO REP PATIENT STATES SHE NO LONGER TAKES PLAVIX;  Surgeon: Holden Pereira MD;  Location: [facility] PAIN MGT;  Service: Pain Management;  Laterality: N/A;       Family History:  Family History   Problem Relation Age of Onset    COPD Mother     Lupus Mother     Hernia Mother     Uterine cancer Mother         vs cervical cancer    Ovarian cancer Mother     Diabetes Father     Coronary artery disease Father     Colon cancer Maternal Grandmother         in her 50's       Social History:  Social History     Socioeconomic History    Marital status:    Tobacco Use    Smoking status: Former     Types: Cigarettes     Quit date: 2020     Years since quittin.4    Smokeless tobacco: Never   Substance and Sexual Activity    Alcohol use: Yes     Comment: occasionally    Drug use: No    Sexual activity: Not Currently   Social History Narrative    Lives with daughter. .      Social Determinants of Health     Financial Resource Strain: Low Risk     Difficulty of Paying  "Living Expenses: Not hard at all   Food Insecurity: No Food Insecurity    Worried About Running Out of Food in the Last Year: Never true    Ran Out of Food in the Last Year: Never true   Transportation Needs: No Transportation Needs    Lack of Transportation (Medical): No    Lack of Transportation (Non-Medical): No   Physical Activity: Insufficiently Active    Days of Exercise per Week: 3 days    Minutes of Exercise per Session: 20 min   Stress: No Stress Concern Present    Feeling of Stress : Not at all   Social Connections: Socially Isolated    Frequency of Communication with Friends and Family: More than three times a week    Frequency of Social Gatherings with Friends and Family: Never    Attends Spiritism Services: Never    Active Member of Clubs or Organizations: No    Attends Club or Organization Meetings: Never    Marital Status:    Housing Stability: Low Risk     Unable to Pay for Housing in the Last Year: No    Number of Places Lived in the Last Year: 1    Unstable Housing in the Last Year: No       OBJECTIVE:    BP (!) 176/85 (BP Location: Right arm, Patient Position: Sitting, BP Method: Medium (Automatic))   Pulse 85   Ht 5' 1" (1.549 m)   BMI 36.70 kg/m²     PHYSICAL EXAMINATION:    General appearance: Well appearing, in no acute distress, alert and oriented x3.  Psych:  Mood and affect appropriate.  Skin: Skin color, texture, turgor normal, no rashes or lesions, in both upper and lower body.  Head/face:  Atraumatic, normocephalic. No palpable lymph nodes  Cor: RRR  Pulm: Non-labored  Back: Straight leg raising in the sitting and supine positions is negative to radicular pain. No pain to palpation over the spine or costovertebral angles.  Extremities: Peripheral joint ROM is full and pain free without obvious instability or laxity in all four extremities. No deformities, edema, or skin discoloration. Good capillary refill.  Musculoskeletal: Bilateral upper and lower extremity strength is " normal and symmetric.  No atrophy or tone abnormalities are noted.  Neuro: Muscle stretch reflexes are diminished but symmetric. Altered sensation in BLE at baseline. Painful paresthesias in BLE.   Gait: Antalgic, uses cane for ambulation today     ASSESSMENT: 78 y.o. year old female with chronic pain, consistent with:     1. Diabetic peripheral neuropathy        2. Polyneuropathy due to secondary diabetes        3. Chronic pain disorder                PLAN:     - I have stressed the importance of physical activity and a home exercise plan to help with pain and improve health.  - Patient can continue with medications for now since they are providing benefits, using them appropriately, and without side effects.  - Qutenza applied today   - Awaiting Nevro SCS trial  - Refill Norco 10/325mg TID  - UDS 2/14/22 reviewed.   - Continue Robaxin 500mg TID #90  - Continue Lyrica 150mg TID #90  - RTC after SCS trail, 91 days for re-application   - Counseled patient regarding the importance of activity modification, constant sleeping habits and physical therapy.    The above plan and management options were discussed at length with patient. Patient is in agreement with the above and verbalized understanding.    Colin Gibbs NP  2/10/2023    I spent a total of 40 minutes on the day of the visit.  This includes face to face time and non-face to face time preparing to see the patient by reviewing previous labs/imaging, obtaining and/or reviewing separately obtained history, documenting clinical information in the electronic or other health record, independently interpreting results and communicating results to the patient/family/caregiver.        PATIENT NAME: Indira Waters    MRN:[id number]     DATE OF PROCEDURE:2/10/2023    Diagnosis: Painful Diabetic Neuropathy E13.42     Postprocedural Diagnosis: Same     Complications: None     Informed Consent:  The patient's condition and proposed procedures, risks (including  complications of nerve damage, skin irritation, infection, and failure of pain relief), and alternatives were discussed with the patient or responsible party.  The patient's/responsible party's questions were answered.  The patient/responsible party appeared to understand and chose to proceed.       Procedural Pause:  A procedural pause verifying correct patient, medical record number, allergies, medications to be administered, current vital signs, and proper application site was performed immediately prior to beginning the procedure.     Procedure in Detail:  The patient was placed in a supine position. 2 patches were used for the procedure today.  The patches were applied to the target area and secured with tape.  A coban was used to further secure the patches in place.  The patient was allowed to rest in a comfortable position, and monitored by staff every 10-15 minutes to ensure comfort. The patches remained in place for 30 minutes.  The patches were then removed and the cleaning gel was applied and allowed to sit for an additional 60 seconds, after which the area was wiped clean.      The patient was then discharged in stable condition.        DISCUSSION:  A Qutenza patch was applied today with the purpose of treating the patients nerve pain. They were informed that they may have some burning of the skin for a few days, and should not take a hot shower for 2-3 days as that may irritate the area.  They were informed that the area may feel like they had a sunburn. They were informed that it can take about 2-4 weeks for the effects of the patch to be fully realized.    I will see the patient for follow up in 1 month.    Plan to repeat every 91 days.

## 2023-02-13 RX ORDER — HYDROCODONE BITARTRATE AND ACETAMINOPHEN 10; 325 MG/1; MG/1
1 TABLET ORAL EVERY 8 HOURS PRN
Qty: 90 TABLET | Refills: 0 | Status: SHIPPED | OUTPATIENT
Start: 2023-02-13 | End: 2023-03-13 | Stop reason: SDUPTHER

## 2023-02-14 ENCOUNTER — PATIENT MESSAGE (OUTPATIENT)
Dept: PAIN MEDICINE | Facility: OTHER | Age: 78
End: 2023-02-14
Payer: MEDICARE

## 2023-02-17 ENCOUNTER — TELEPHONE (OUTPATIENT)
Dept: SPEECH THERAPY | Facility: HOSPITAL | Age: 78
End: 2023-02-17
Payer: MEDICARE

## 2023-02-23 ENCOUNTER — SPECIALTY PHARMACY (OUTPATIENT)
Dept: PHARMACY | Facility: CLINIC | Age: 78
End: 2023-02-23
Payer: MEDICARE

## 2023-02-23 NOTE — TELEPHONE ENCOUNTER
Specialty Pharmacy - Refill Coordination    Specialty Medication Orders Linked to Encounter      Flowsheet Row Most Recent Value   Medication #1 teriparatide 20 mcg/dose (620mcg/2.48mL) PnIj (Order#[id number], Rx#[id number])            Refill Questions - Documented Responses      Flowsheet Row Most Recent Value   Patient Availability and HIPAA Verification    Does patient want to proceed with activity? Yes   HIPAA/medical authority confirmed? Yes   Relationship to patient of person spoken to? Self   Refill Screening Questions    Changes to allergies? No   Changes to medications? No   New conditions since last clinic visit? No   Unplanned office visit, urgent care, ED, or hospital admission in the last 4 weeks? No   How does patient/caregiver feel medication is working? Good   Financial problems or insurance changes? No   How many doses of your specialty medications were missed in the last 4 weeks? 0   Would patient like to speak to a pharmacist? No   When does the patient need to receive the medication? 02/28/23   Refill Delivery Questions    How will the patient receive the medication? MEDRx   When does the patient need to receive the medication? 02/28/23   Shipping Address Home   Address in [facility] confirmed and updated if neccessary? Yes   Expected Copay ($) 4.3   Is the patient able to afford the medication copay? Yes   Payment Method CC on file   Days supply of Refill 28   Supplies needed? Alcohol Swabs, Pen needles   Refill activity completed? Yes   Refill activity plan Refill scheduled   Shipment/Pickup Date: 02/24/23            Current Outpatient Medications   Medication Sig    acetaminophen (TYLENOL) 500 MG tablet Take 2 tablets (1,000 mg total) by mouth every 8 (eight) hours as needed for Pain.    allopurinoL (ZYLOPRIM) 300 MG tablet Take 1 tablet (300 mg total) by mouth once daily.    aspirin (ECOTRIN) 81 MG EC tablet Take 1 tablet (81 mg total) by mouth once daily.    atorvastatin (LIPITOR) 40  MG tablet atorvastatin 40 mg tablet    blood sugar diagnostic Strp 1 each by Misc.(Non-Drug; Combo Route) route 4 (four) times daily.    blood-glucose meter Misc Follow package directions (Patient taking differently: Follow package directions)    blood-glucose meter,continuous (DEXCOM G6 ) Misc 1 each by Misc.(Non-Drug; Combo Route) route continuous prn.    blood-glucose sensor (DEXCOM G6 SENSOR) Nicole 3 each by Misc.(Non-Drug; Combo Route) route continuous prn. Change every 10 days.    blood-glucose transmitter (DEXCOM G6 TRANSMITTER) Nicole 1 each by Misc.(Non-Drug; Combo Route) route continuous prn.    cloNIDine (CATAPRES) 0.1 MG tablet Take 1 tablet (0.1 mg total) by mouth 2 (two) times daily.    evolocumab (REPATHA SURECLICK) 140 mg/mL PnIj Inject 1 mL (140 mg total) into the skin every 14 (fourteen) days.    fenofibrate micronized (LOFIBRA) 134 MG Cap Take 1 capsule (134 mg total) by mouth daily with breakfast.    furosemide (LASIX) 40 MG tablet Take 1 tablet (40 mg total) by mouth once daily.    HYDROcodone-acetaminophen (NORCO)  mg per tablet Take 1 tablet by mouth every 8 (eight) hours as needed for Pain.    hydrocortisone (ANUSOL-HC) 2.5 % rectal cream Procto-Med HC 2.5 % topical cream perineal applicator    insulin aspart U-100 (NOVOLOG) 100 unit/mL (3 mL) InPn pen Inject 12 Units into the skin 3 (three) times daily with meals. Plus correction scale. Max TDD 40units.    insulin detemir U-100 (LEVEMIR FLEXTOUCH U-100 INSULN) 100 unit/mL (3 mL) InPn pen Inject 20 Units into the skin 2 (two) times daily.    ipratropium-albuteroL (COMBIVENT)  mcg/actuation inhaler Inhale 1 puff into the lungs by mouth every 6 (six) hours.    losartan (COZAAR) 50 MG tablet Take 1 tablet (50 mg total) by mouth once daily.    methocarbamoL (ROBAXIN) 500 MG Tab Take 1 tablet (500 mg total) by mouth 3 (three) times daily as needed (muscle spasm).    metoclopramide HCl (REGLAN) 5 MG tablet Take 1 tablet (5 mg  "total) by mouth 4 (four) times daily as needed (nausea, prevention).    metoprolol succinate (TOPROL-XL) 100 MG 24 hr tablet Take 1 tablet (100 mg total) by mouth once daily.    NIFEdipine (PROCARDIA-XL) 30 MG (OSM) 24 hr tablet Take 1 tablet (30 mg total) by mouth 2 (two) times a day.    nitroGLYCERIN (NITROSTAT) 0.4 MG SL tablet Place 1 tablet (0.4 mg total) under the tongue every 5 (five) minutes as needed for Chest pain.    pantoprazole (PROTONIX) 40 MG tablet Take 1 tablet (40 mg total) by mouth once daily.    pen needle, diabetic (BD ULTRA-FINE HIEN PEN NEEDLE) 32 gauge x 5/32" Ndle 1 each by Misc.(Non-Drug; Combo Route) route 4 (four) times daily.    pregabalin (LYRICA) 150 MG capsule Take 1 capsule (150 mg total) by mouth 3 (three) times daily.    senna-docusate 8.6-50 mg (PERICOLACE) 8.6-50 mg per tablet Take 1 tablet by mouth 2 (two) times daily as needed for Constipation.    sertraline (ZOLOFT) 100 MG tablet Take 1 tablet (100 mg total) by mouth once daily.    sulfamethoxazole-trimethoprim 800-160mg (BACTRIM DS) 800-160 mg Tab sulfamethoxazole 800 mg-trimethoprim 160 mg tablet    teriparatide 20 mcg/dose (620mcg/2.48mL) PnIj Inject 20 mcg into the skin once daily.    triamcinolone acetonide 0.1% (KENALOG) 0.1 % cream Apply to affected areas of body twice daily as needed rash. Do not use on face, underarms, or groin.    TRUEPLUS LANCETS 30 gauge Misc TEST AS DIRECTED FOUR TIMES DAILY   Last reviewed on 2/8/2023  3:35 PM by Adeel Tanner MD    Review of patient's allergies indicates:   Allergen Reactions    Bleach (sodium hypochlorite) Shortness Of Breath    Nitrofurantoin macrocrystalline Anaphylaxis    Lipitor [atorvastatin] Diarrhea and Rash    Nsaids (non-steroidal anti-inflammatory drug)     Pcn [penicillins]     Toradol [ketorolac]     Last reviewed on  2/10/2023 1:28 PM by Joyce Austin      Tasks added this encounter   3/21/2023 - Refill Call (Auto Added)   Tasks due within next 3 months "   3/8/2023 - Refill Call (Auto Added)     [facility]  [address]  [facility] [address]  Phone: [phone]  Fax: [phone]

## 2023-03-01 ENCOUNTER — PATIENT MESSAGE (OUTPATIENT)
Dept: ADMINISTRATIVE | Facility: OTHER | Age: 78
End: 2023-03-01
Payer: MEDICARE

## 2023-03-06 ENCOUNTER — TELEPHONE (OUTPATIENT)
Dept: PAIN MEDICINE | Facility: CLINIC | Age: 78
End: 2023-03-06
Payer: MEDICARE

## 2023-03-06 NOTE — TELEPHONE ENCOUNTER
----- Message from Rod Capellan sent at 3/6/2023  9:01 AM CST -----  Type:  Patient Returning Call    Who Called:     Who Left Message for Patient:     Does the patient know what this is regarding?: missed call     Best Call Back Number: [phone] / lora /wilfredo     Additional Information:

## 2023-03-07 ENCOUNTER — PATIENT MESSAGE (OUTPATIENT)
Dept: PAIN MEDICINE | Facility: OTHER | Age: 78
End: 2023-03-07
Payer: MEDICARE

## 2023-03-08 ENCOUNTER — HOSPITAL ENCOUNTER (OUTPATIENT)
Facility: OTHER | Age: 78
Discharge: HOME | End: 2023-03-08
Attending: ANESTHESIOLOGY | Admitting: ANESTHESIOLOGY
Payer: MEDICARE

## 2023-03-08 ENCOUNTER — SPECIALTY PHARMACY (OUTPATIENT)
Dept: PHARMACY | Facility: CLINIC | Age: 78
End: 2023-03-08
Payer: MEDICARE

## 2023-03-08 ENCOUNTER — TELEPHONE (OUTPATIENT)
Dept: PAIN MEDICINE | Facility: CLINIC | Age: 78
End: 2023-03-08
Payer: MEDICARE

## 2023-03-08 VITALS
SYSTOLIC BLOOD PRESSURE: 164 MMHG | HEART RATE: 74 BPM | BODY MASS INDEX: 36.63 KG/M2 | HEIGHT: 61 IN | TEMPERATURE: 99 F | DIASTOLIC BLOOD PRESSURE: 72 MMHG | OXYGEN SATURATION: 98 % | RESPIRATION RATE: 16 BRPM | WEIGHT: 194 LBS

## 2023-03-08 DIAGNOSIS — M54.15 RADICULOPATHY OF THORACOLUMBAR REGION: Primary | ICD-10-CM

## 2023-03-08 DIAGNOSIS — G89.29 CHRONIC PAIN: ICD-10-CM

## 2023-03-08 LAB
POCT GLUCOSE: 346 MG/DL (ref 70–110)
POCT GLUCOSE: 366 MG/DL (ref 70–110)
POCT GLUCOSE: 375 MG/DL (ref 70–110)

## 2023-03-08 PROCEDURE — 25000003 PHARM REV CODE 250: Performed by: ANESTHESIOLOGY

## 2023-03-08 PROCEDURE — 82947 ASSAY GLUCOSE BLOOD QUANT: CPT | Mod: 91 | Performed by: ANESTHESIOLOGY

## 2023-03-08 PROCEDURE — 63650 IMPLANT NEUROELECTRODES: CPT | Mod: ,,, | Performed by: ANESTHESIOLOGY

## 2023-03-08 PROCEDURE — C1778 LEAD, NEUROSTIMULATOR: HCPCS | Performed by: ANESTHESIOLOGY

## 2023-03-08 PROCEDURE — 63600175 PHARM REV CODE 636 W HCPCS: Performed by: ANESTHESIOLOGY

## 2023-03-08 PROCEDURE — 63650 PR PERCUT IMPLNT NEUROELECT,EPIDURAL: ICD-10-PCS | Mod: ,,, | Performed by: ANESTHESIOLOGY

## 2023-03-08 PROCEDURE — 63650 IMPLANT NEUROELECTRODES: CPT | Performed by: ANESTHESIOLOGY

## 2023-03-08 DEVICE — IMPLANTABLE DEVICE: Type: IMPLANTABLE DEVICE | Site: BACK | Status: FUNCTIONAL

## 2023-03-08 RX ORDER — BUPIVACAINE HYDROCHLORIDE 2.5 MG/ML
INJECTION, SOLUTION EPIDURAL; INFILTRATION; INTRACAUDAL
Status: DISCONTINUED | OUTPATIENT
Start: 2023-03-08 | End: 2023-03-08 | Stop reason: HOSPADM

## 2023-03-08 RX ORDER — SODIUM CHLORIDE 9 MG/ML
500 INJECTION, SOLUTION INTRAVENOUS CONTINUOUS
Status: DISCONTINUED | OUTPATIENT
Start: 2023-03-08 | End: 2023-03-08 | Stop reason: HOSPADM

## 2023-03-08 RX ORDER — FENTANYL CITRATE 50 UG/ML
INJECTION, SOLUTION INTRAMUSCULAR; INTRAVENOUS
Status: DISCONTINUED | OUTPATIENT
Start: 2023-03-08 | End: 2023-03-08 | Stop reason: HOSPADM

## 2023-03-08 RX ORDER — DOXYCYCLINE 100 MG/1
100 CAPSULE ORAL EVERY 12 HOURS
Qty: 14 CAPSULE | Refills: 0 | Status: SHIPPED | OUTPATIENT
Start: 2023-03-08 | End: 2023-03-15

## 2023-03-08 RX ORDER — DOXYCYCLINE 100 MG/1
100 CAPSULE ORAL EVERY 12 HOURS
Qty: 14 CAPSULE | Refills: 0 | Status: SHIPPED | OUTPATIENT
Start: 2023-03-08 | End: 2023-03-16

## 2023-03-08 RX ORDER — CLINDAMYCIN PHOSPHATE 900 MG/50ML
900 INJECTION, SOLUTION INTRAVENOUS ONCE
Status: COMPLETED | OUTPATIENT
Start: 2023-03-08 | End: 2023-03-08

## 2023-03-08 RX ORDER — SODIUM CHLORIDE 9 MG/ML
INJECTION, SOLUTION INTRAVENOUS
Status: COMPLETED | OUTPATIENT
Start: 2023-03-08 | End: 2023-03-08

## 2023-03-08 RX ORDER — MIDAZOLAM HYDROCHLORIDE 1 MG/ML
INJECTION INTRAMUSCULAR; INTRAVENOUS
Status: DISCONTINUED | OUTPATIENT
Start: 2023-03-08 | End: 2023-03-08 | Stop reason: HOSPADM

## 2023-03-08 RX ORDER — LIDOCAINE HYDROCHLORIDE 20 MG/ML
INJECTION, SOLUTION INFILTRATION; PERINEURAL
Status: DISCONTINUED | OUTPATIENT
Start: 2023-03-08 | End: 2023-03-08 | Stop reason: HOSPADM

## 2023-03-08 RX ADMIN — CLINDAMYCIN PHOSPHATE 900 MG: 900 INJECTION, SOLUTION INTRAVENOUS at 08:03

## 2023-03-08 NOTE — TELEPHONE ENCOUNTER
Specialty Pharmacy - Refill Coordination    Specialty Medication Orders Linked to Encounter      Flowsheet Row Most Recent Value   Medication #1 evolocumab (REPATHA SURECLICK) 140 mg/mL PnIj (Order#[id number], Rx#[id number])            Refill Questions - Documented Responses      Flowsheet Row Most Recent Value   Patient Availability and HIPAA Verification    Does patient want to proceed with activity? Yes   HIPAA/medical authority confirmed? Yes   Relationship to patient of person spoken to? Child   Refill Screening Questions    Would patient like to speak to a pharmacist? No   When does the patient need to receive the medication? 03/15/23   Refill Delivery Questions    How will the patient receive the medication? MEDRx   When does the patient need to receive the medication? 03/15/23   Shipping Address Home   Address in [facility] confirmed and updated if neccessary? Yes   Expected Copay ($) 4.3   Is the patient able to afford the medication copay? Yes   Payment Method CC on file   Days supply of Refill 28   Supplies needed? No supplies needed   Refill activity completed? Yes   Refill activity plan Refill scheduled   Shipment/Pickup Date: 03/13/23            No current outpatient medications on file.     Last reviewed on 3/8/2023  7:40 AM by Nancy Waters RN    Review of patient's allergies indicates:   Allergen Reactions    Bleach (sodium hypochlorite) Shortness Of Breath    Nitrofurantoin macrocrystalline Anaphylaxis    Lipitor [atorvastatin] Diarrhea and Rash    Nsaids (non-steroidal anti-inflammatory drug)     Pcn [penicillins]     Toradol [ketorolac]     Last reviewed on  3/8/2023 7:42 AM by Anette Le      Tasks added this encounter   4/3/2023 - Refill Call (Auto Added)   Tasks due within next 3 months   No tasks due.     Peggy Donato, PharmD  [facility]  [address] [place] [address]  Phone: [phone]  Fax: [phone]

## 2023-03-08 NOTE — TELEPHONE ENCOUNTER
Attempted to call Mrs. Waters as well as daughter Mrs. Severino to remind them to  post-procedural antibiotics.  Unable to reach patient or her daughter. Message sent to pain management pool to continue follow up with her.

## 2023-03-08 NOTE — OP NOTE
Spinal Cord Stimulator Trial Fluoroscopic Guidance    The procedure, risks, benefits, and options were discussed with the patient. There are no contraindications to the procedure. The patent expressed understanding and agreed to the procedure. Informed written consent was obtained prior to the start of the procedure and can be found in the patient's chart.    PATIENT NAME: Indira Waters   MRN: [id number]     DATE OF PROCEDURE: 03/08/2023    PROCEDURE:   1) Placement of Octad Electrode by 2   2) Intraoperative and Postoperative Programming, Simple   3) Spinal Cord Stimulator Trial     PRE-OP DIAGNOSIS: Chronic pain syndrome [G89.4]  Radiculopathy of thoracolumbar region [M54.15] PAINFUL DIABETIC NEUROPATHY     POST-OP DIAGNOSIS: SAME    PHYSICIAN: Holden Pereira MD    ASSISTANTS: Anette Le MD Pain Fellow     MEDICATIONS INJECTED: Bupivacaine 0.25%     LOCAL ANESTHETIC INJECTED: Xylocaine 2%     SEDATION: Versed 2mg and Fentanyl 50mcg                                                                                                                                                                                     Conscious sedation ordered by M.D. Patient re-evaluation prior to administration of conscious sedation. No changes noted in patient's status from initial evaluation. The patient's vital signs were monitored by RN and patient remained hemodynamically stable throughout the procedure.    Event Time In   Sedation Start 0919   Sedation End 0953       ESTIMATED BLOOD LOSS: None    COMPLICATIONS: None    TECHNIQUE: Time-out was performed to identify the patient and procedure to be performed. The patient was placed in the prone position on the OR table, the area overlying the  lumbar spine was prepped and draped in usual sterile fashion using chlorhexidine. The entry site of the  lumbar spine was identified at the T12/L1 interspace under fluoroscopy guidance. The entry site was anesthetized with Lidocaine 2%  followed by a 22 gauge, 3.5 inch spinal needle to anesthetize the deeper tissue up to the supraspinous ligament. A 20 gauge, 3.5 inch Tuohy needle was then advanced to contact the right L1 lamina.  It was walked off in a superior medial direction until it entered the epidural space using loss of resistance to air. The spinal cord stimulator lead was advanced through the Tuohy needle and directed to rest the tip at the bottom of the T7 vertebral body.  The same procedure was repeated in detail for the left side to insert a second lead within the epidural space to reside adjacent to the first lead.  As the 2nd lead was guided into the epidural space, the position was not adequate to allow coverage at T11 as it was following an anterior tract created by the first lead paramedian, thus the decision was made to modify the entry point to enter the epidural space at L1/L2. The lead was able to be placed adequately at the top of T9 vertebral body. The patient was allowed to fully awaken from anesthesia.  Testing was carried out by the device representative under the guidance of Holden Pereira MD. Leads were tested for impedence. Once the leads were adjusted to by within the proper positioning, needles were withdrawn leaving the leads in place and Biopatch was applied then leads were secured to the patients skin using sutures and  Stay-Fix adhesive bandages and tegaderm. The patients back was cleaned. No specimens collected. The patient tolerated the procedure well.     The patient was monitored after the procedure in the recovery area. They were given post-procedure and discharge instructions to follow at home. The patient was discharged in a stable condition.    Clarisse Haynes DO    I reviewed and edited the fellow's note. I conducted my own interview and physical examination. I agree with the findings. I was present and supervising all critical portions of the procedure.    Holden Pereira MD

## 2023-03-08 NOTE — H&P
HPI  Patient presenting for Procedure(s) (LRB):  SPINAL CORD STIMULATOR TRIAL NEVRO REP PATIENT STATES SHE NO LONGER TAKES PLAVIX (N/A)     Patient on Anti-coagulation Yes, ASA 81mg    No health changes since previous encounter    Past Medical History:   Diagnosis Date    Arthritis     Back pain     Cancer     ovarian    Cervical cancer     Depression     Diabetes mellitus     Fibromyalgia     Heart attack     Hyperlipidemia     Hypertension     Lupus     Stroke     slight left sided weakness     Past Surgical History:   Procedure Laterality Date    APPENDECTOMY       SECTION      2    CORONARY ANGIOGRAPHY N/A 2022    Procedure: ANGIOGRAM, CORONARY ARTERY;  Surgeon: Jose L Méndez MD;  Location: [facility] CATH LAB;  Service: Cardiology;  Laterality: N/A;    HYSTERECTOMY      with USO for cervical cancer    INJECTION OF ANESTHETIC AGENT AROUND NERVE Bilateral 2021    Procedure: BLOCK, NERVE, SYMPATHIC;  Surgeon: Holden Pereira MD;  Location: [facility] PAIN MGT;  Service: Pain Management;  Laterality: Bilateral;    INJECTION OF ANESTHETIC AGENT AROUND NERVE N/A 2021    Procedure: BLOCK, NERVE, SYMPATHETIC  need consent;  Surgeon: Holden Pereira MD;  Location: [facility] PAIN [facility]GT;  Service: Pain Management;  Laterality: N/A;    ME EVAL,SWALLOW FUNCTION,CINE/VIDEO RECORD  2021         TONSILLECTOMY       Review of patient's allergies indicates:   Allergen Reactions    Bleach (sodium hypochlorite) Shortness Of Breath    Nitrofurantoin macrocrystalline Anaphylaxis    Lipitor [atorvastatin] Diarrhea and Rash    Nsaids (non-steroidal anti-inflammatory drug)     Pcn [penicillins]     Toradol [ketorolac]       No current facility-administered medications for this encounter.       PMHx, PSHx, Allergies, Medications reviewed in epic    ROS negative except pain complaints in HPI    OBJECTIVE:    Breastfeeding No     PHYSICAL EXAMINATION:    GENERAL: Well appearing, in no acute distress, alert and  oriented x3.  PSYCH:  Mood and affect appropriate.  SKIN: Skin color, texture, turgor normal, no rashes or lesions which will impact the procedure.  CV: RRR with palpation of the radial artery.  PULM: No evidence of respiratory difficulty, symmetric chest rise. Clear to auscultation.  NEURO: Cranial nerves grossly intact.    Plan:    Proceed with procedure as planned Procedure(s) (LRB):  SPINAL CORD STIMULATOR TRIAL NEVRO REP PATIENT STATES SHE NO LONGER TAKES PLAVIX (N/A)    Anette Le  03/08/2023

## 2023-03-08 NOTE — DISCHARGE SUMMARY
Discharge Note  Short Stay      SUMMARY     Admit Date: 3/8/2023    Attending Physician: Holden Pereira    Discharge Physician: Anette Le      Discharge Date: 3/8/2023 10:09 AM    Procedure(s) (LRB):  LUMBAR SPINAL CORD STIMULATOR TRIAL NEVRO REP PATIENT STATES SHE NO LONGER TAKES PLAVIX (N/A)    Final Diagnosis: Chronic pain syndrome [G89.4]  Radiculopathy of thoracolumbar region [M54.15]    Disposition: Home or self care    Patient Instructions:   Current Discharge Medication List        CONTINUE these medications which have NOT CHANGED    Details   acetaminophen (TYLENOL) 500 MG tablet Take 2 tablets (1,000 mg total) by mouth every 8 (eight) hours as needed for Pain.  Refills: 0      allopurinoL (ZYLOPRIM) 300 MG tablet Take 1 tablet (300 mg total) by mouth once daily.  Qty: 90 tablet, Refills: 1    Associated Diagnoses: Hyperuricemia      aspirin (ECOTRIN) 81 MG EC tablet Take 1 tablet (81 mg total) by mouth once daily.  Qty: 30 tablet, Refills: 0      atorvastatin (LIPITOR) 40 MG tablet atorvastatin 40 mg tablet      blood sugar diagnostic Strp 1 each by Misc.(Non-Drug; Combo Route) route 4 (four) times daily.  Qty: 200 each, Refills: 0      blood-glucose meter Misc Follow package directions  Qty: 1 each, Refills: 0      blood-glucose meter,continuous (DEXCOM G6 ) Misc 1 each by Misc.(Non-Drug; Combo Route) route continuous prn.  Qty: 1 each, Refills: PRN    Associated Diagnoses: Type 2 diabetes mellitus with hyperglycemia, with long-term current use of insulin      blood-glucose sensor (DEXCOM G6 SENSOR) Nicole 3 each by Misc.(Non-Drug; Combo Route) route continuous prn. Change every 10 days.  Qty: 3 each, Refills: PRN    Associated Diagnoses: Type 2 diabetes mellitus with hyperglycemia, with long-term current use of insulin      blood-glucose transmitter (DEXCOM G6 TRANSMITTER) Nicole 1 each by Misc.(Non-Drug; Combo Route) route continuous prn.  Qty: 1 each, Refills: PRN    Associated Diagnoses: Type  2 diabetes mellitus with hyperglycemia, with long-term current use of insulin      cloNIDine (CATAPRES) 0.1 MG tablet Take 1 tablet (0.1 mg total) by mouth 2 (two) times daily.  Qty: 180 tablet, Refills: 0    Comments: .  Associated Diagnoses: Essential hypertension      evolocumab (REPATHA SURECLICK) 140 mg/mL PnIj Inject 1 mL (140 mg total) into the skin every 14 (fourteen) days.  Qty: 8 each, Refills: 3    Associated Diagnoses: Familial hypercholesterolemia; 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 pectoris; Coronary artery calcification      fenofibrate micronized (LOFIBRA) 134 MG Cap Take 1 capsule (134 mg total) by mouth daily with breakfast.  Qty: 90 capsule, Refills: 3    Associated Diagnoses: Hypertriglyceridemia      furosemide (LASIX) 40 MG tablet Take 1 tablet (40 mg total) by mouth once daily.  Qty: 30 tablet, Refills: 6      HYDROcodone-acetaminophen (NORCO)  mg per tablet Take 1 tablet by mouth every 8 (eight) hours as needed for Pain.  Qty: 90 tablet, Refills: 0    Comments: Quantity prescribed more than 7 day supply? Yes, quantity medically necessary  Associated Diagnoses: Chronic pain disorder; Diabetic polyneuropathy associated with diabetes mellitus due to underlying condition; Chronic pain syndrome      hydrocortisone (ANUSOL-HC) 2.5 % rectal cream Procto-Med HC 2.5 % topical cream perineal applicator      insulin aspart U-100 (NOVOLOG) 100 unit/mL (3 mL) InPn pen Inject 12 Units into the skin 3 (three) times daily with meals. Plus correction scale. Max TDD 40units.  Qty: 15 mL, Refills: 3    Associated Diagnoses: Type 2 diabetes mellitus with other circulatory complication, without long-term current use of insulin      insulin detemir U-100 (LEVEMIR FLEXTOUCH U-100 INSULN) 100 unit/mL (3 mL) InPn pen Inject 20 Units into the skin 2 (two) times daily.  Qty: 15 mL, Refills: 3    Associated Diagnoses: Type 2 diabetes mellitus with other circulatory  "complication, without long-term current use of insulin      ipratropium-albuteroL (COMBIVENT)  mcg/actuation inhaler Inhale 1 puff into the lungs by mouth every 6 (six) hours.  Qty: 4 g, Refills: 0      losartan (COZAAR) 50 MG tablet Take 1 tablet (50 mg total) by mouth once daily.  Qty: 90 tablet, Refills: 0    Comments: .      methocarbamoL (ROBAXIN) 500 MG Tab Take 1 tablet (500 mg total) by mouth 3 (three) times daily as needed (muscle spasm).  Qty: 90 tablet, Refills: 2    Associated Diagnoses: Chronic pain disorder; Diabetic polyneuropathy associated with diabetes mellitus due to underlying condition; Chronic pain syndrome      metoclopramide HCl (REGLAN) 5 MG tablet Take 1 tablet (5 mg total) by mouth 4 (four) times daily as needed (nausea, prevention).  Qty: 30 tablet, Refills: 3    Associated Diagnoses: Gastroparesis      metoprolol succinate (TOPROL-XL) 100 MG 24 hr tablet Take 1 tablet (100 mg total) by mouth once daily.  Qty: 90 tablet, Refills: 3    Comments: .  Associated Diagnoses: Essential hypertension      NIFEdipine (PROCARDIA-XL) 30 MG (OSM) 24 hr tablet Take 1 tablet (30 mg total) by mouth 2 (two) times a day.  Qty: 180 tablet, Refills: 0    Comments: .      nitroGLYCERIN (NITROSTAT) 0.4 MG SL tablet Place 1 tablet (0.4 mg total) under the tongue every 5 (five) minutes as needed for Chest pain.  Qty: 25 tablet, Refills: 5    Associated Diagnoses: Angina at rest      pantoprazole (PROTONIX) 40 MG tablet Take 1 tablet (40 mg total) by mouth once daily.  Qty: 30 tablet, Refills: 0      pen needle, diabetic (BD ULTRA-FINE HIEN PEN NEEDLE) 32 gauge x 5/32" Ndle 1 each by Misc.(Non-Drug; Combo Route) route 4 (four) times daily.  Qty: 400 each, Refills: 3    Associated Diagnoses: Type 2 diabetes mellitus with hyperglycemia, with long-term current use of insulin      pregabalin (LYRICA) 150 MG capsule Take 1 capsule (150 mg total) by mouth 3 (three) times daily.  Qty: 90 capsule, Refills: 2    " Associated Diagnoses: Chronic pain disorder      senna-docusate 8.6-50 mg (PERICOLACE) 8.6-50 mg per tablet Take 1 tablet by mouth 2 (two) times daily as needed for Constipation.  Qty: 60 tablet, Refills: 0      sertraline (ZOLOFT) 100 MG tablet Take 1 tablet (100 mg total) by mouth once daily.  Qty: 90 tablet, Refills: 3    Associated Diagnoses: Anxiety disorder, unspecified type      sulfamethoxazole-trimethoprim 800-160mg (BACTRIM DS) 800-160 mg Tab sulfamethoxazole 800 mg-trimethoprim 160 mg tablet      teriparatide 20 mcg/dose (620mcg/2.48mL) PnIj Inject 20 mcg into the skin once daily.  Qty: 2.48 mL, Refills: 11    Associated Diagnoses: Age-related osteoporosis without current pathological fracture      triamcinolone acetonide 0.1% (KENALOG) 0.1 % cream Apply to affected areas of body twice daily as needed rash. Do not use on face, underarms, or groin.  Qty: 454 g, Refills: 0    Associated Diagnoses: Palpable purpura      TRUEPLUS LANCETS 30 gauge Misc TEST AS DIRECTED FOUR TIMES DAILY  Qty: 200 each, Refills: 2                 Discharge Diagnosis: Chronic pain syndrome [G89.4]  Radiculopathy of thoracolumbar region [M54.15]  Condition on Discharge: Stable with no complications to procedure   Diet on Discharge: Same as before.  Activity: as per instruction sheet.  Discharge to: Home with a responsible adult.  Follow up: 2-4 weeks       Please call my office or pager at [phone] if experienced any weakness or loss of sensation, fever > 101.5, pain uncontrolled with oral medications, persistent nausea/vomiting/or diarrhea, redness or drainage from the incisions, or any other worrisome concerns. If physician on call was not reached or could not communicate with our office for any reason please go to the nearest emergency department        Holden Pereira

## 2023-03-08 NOTE — DISCHARGE INSTRUCTIONS
Thank you for allowing us to care for you today. You may receive a survey about the care we provided. Your feedback is valuable and helps us provide excellent care throughout the community.     Home Care Instructions Pain Management:    1. DIET:   You may resume your normal diet today.   2. BATHING:   Sponge baths ONLY. No showers or tub baths with Spinal Cord Stimulator (SCS) in.   3. DRESSING:   Do not remove bandage. Doctor will remove at follow up visit. You can reinforce edges with band aids or skin tape if they start to come up.  4. ACTIVITY LEVEL:   Nothing strenuous with SCS in place. No bending of the back, no lifting anything heavier than a gallon of milk.    5. MEDICATIONS:   You may resume your normal medications today. DO NOT take any blood thinners with SCS in place.    6. SPECIAL INSTRUCTIONS:   No heat or ice  to the injection site while SCS is in place     If you have received any sedation through your IV, you may not drive for 24 hrs.     Please call the PAIN MANAGEMENT office at [phone] or ON CALL pager at [phone] if you experienced any:   1. Redness or swelling around the injection site.  2. Fever of 101 degrees or more  3. Drainage (pus) from the injection site.  4. For any continuous bleeding (some dried blood over the incision is normal.)    FOR EMERGENCIES:   If any unusual problems or difficulties occur during clinic hours, call [phone] or [phone].  Adult Procedural Sedation Instructions    Recovery After Procedural Sedation (Adult)  You have been given medicine by vein to make you sleep during your surgery. This may have included both a pain medicine and sleeping medicine. Most of the effects have worn off. But you may still have some drowsiness for the next 6 to 8 hours.  Home care  Follow these guidelines when you get home:  For the next 8 hours, you should be watched by a responsible adult. This person should make sure your condition is not getting worse.  Don't drink any  alcohol for the next 24 hours.  Don't drive, operate dangerous machinery, or make important business or personal decisions during the next 24 hours.  Note: Your healthcare provider may tell you not to take any medicine by mouth for pain or sleep in the next 4 hours. These medicines may react with the medicines you were given in the hospital. This could cause a much stronger response than usual.  Follow-up care  Follow up with your healthcare provider if you are not alert and back to your usual level of activity within 12 hours.  When to seek medical advice  Call your healthcare provider right away if any of these occur:  Drowsiness gets worse  Weakness or dizziness gets worse  Repeated vomiting  You can't be awakened   Date Last Reviewed: 10/18/2016  © 7523-7094 [employer]. [address] [place] [address], [place] [address]. All rights reserved. This information is not intended as a substitute for professional medical care. Always follow your healthcare professional's instructions.

## 2023-03-08 NOTE — TELEPHONE ENCOUNTER
----- Message from Anette Le MD sent at 3/8/2023 11:04 AM CST -----  Regarding: Antibiotic Followup  Hi Everyone,    We sent doxycycline to the pharmacy for this patient to take following her SCS trial today.  It doesn't appear that she's picked up her prescription yet.  We attempted to call her mobile number and her daughter's number which is listed, however there was no answer and no ability to leave a voicemail. Can someone please follow up with her to make sure she gets and takes her antibiotics?    Thank you!    Anette

## 2023-03-12 DIAGNOSIS — G89.4 CHRONIC PAIN DISORDER: ICD-10-CM

## 2023-03-12 DIAGNOSIS — G89.4 CHRONIC PAIN SYNDROME: ICD-10-CM

## 2023-03-12 DIAGNOSIS — E08.42 DIABETIC POLYNEUROPATHY ASSOCIATED WITH DIABETES MELLITUS DUE TO UNDERLYING CONDITION: ICD-10-CM

## 2023-03-12 RX ORDER — HYDROCODONE BITARTRATE AND ACETAMINOPHEN 10; 325 MG/1; MG/1
1 TABLET ORAL EVERY 8 HOURS PRN
Qty: 90 TABLET | Refills: 0 | Status: CANCELLED | OUTPATIENT
Start: 2023-03-12 | End: 2023-04-11

## 2023-03-13 ENCOUNTER — NURSE TRIAGE (OUTPATIENT)
Dept: ADMINISTRATIVE | Facility: CLINIC | Age: 78
End: 2023-03-13
Payer: MEDICARE

## 2023-03-13 DIAGNOSIS — G89.4 CHRONIC PAIN DISORDER: ICD-10-CM

## 2023-03-13 DIAGNOSIS — E08.42 DIABETIC POLYNEUROPATHY ASSOCIATED WITH DIABETES MELLITUS DUE TO UNDERLYING CONDITION: ICD-10-CM

## 2023-03-13 DIAGNOSIS — G89.4 CHRONIC PAIN SYNDROME: ICD-10-CM

## 2023-03-13 RX ORDER — HYDROCODONE BITARTRATE AND ACETAMINOPHEN 10; 325 MG/1; MG/1
1 TABLET ORAL EVERY 8 HOURS PRN
Qty: 90 TABLET | Refills: 0 | Status: SHIPPED | OUTPATIENT
Start: 2023-03-14 | End: 2023-04-11 | Stop reason: SDUPTHER

## 2023-03-13 NOTE — TELEPHONE ENCOUNTER
----- Message from Narcisa Santos sent at 3/13/2023  4:21 PM CDT -----  Regarding: please call  Name of Who is Calling:  Esha / daughter        What is the request in detail:Pt daughter is calling to see when her mom's pain med will be filled. She also canceled moms' appt bc she said mom cant make it there w/ out her meds. Please c/b to advise           Can the clinic reply by [facility]:          What Number to Call Back if not in [facility]: [phone] or [phone]

## 2023-03-13 NOTE — TELEPHONE ENCOUNTER
----- Message from Valeria Yanez sent at 3/13/2023  9:15 AM CDT -----  Regarding: Medication  Refill  Request        Medication  Refill  Request:       Reply in [facility]:  NO      Please refill the medication(s) listed below. Please call the patient:  [phone] (T)      Medication   HYDROcodone-acetaminophen (NORCO)  mg per tablet                            Preferred Pharmacy: [facility]   Phone: [phone]  Fax:  [phone]

## 2023-03-13 NOTE — TELEPHONE ENCOUNTER
Patient requesting refill on norco 10 mg   Last office visit 2/10/23   shows last refill on 2/13/23  Patient does have a pain contract on file with [facility]  Patient last UDS 2/14/22 was consistent with current therapy   CODEINE  Not Detected     MORPHINE  Not Detected     6-ACETYLMORPHINE  Not Detected     OXYCODONE  Not Detected     NOROYXCODONE  Not Detected     OXYMORPHONE  Not Detected     NOROXYMORPHONE  Not Detected     HYDROCODONE  Present     NORHYDROCODONE  Present     HYDROMORPHONE  Present     BUPRENORPHINE  Not Detected     NORUBPRENORPHINE  Not Detected     FENTANYL  Not Detected

## 2023-03-13 NOTE — TELEPHONE ENCOUNTER
Daughter was checking on refill. I told her it is in for a refill on 3/14 and not 3/15. Daughter is satisfied.

## 2023-03-13 NOTE — TELEPHONE ENCOUNTER
Reason for Disposition   Caller has medicine question only, adult not sick, AND triager answers question    Additional Information   Negative: [1] Intentional drug overdose AND [2] suicidal thoughts or ideas   Negative: Drug overdose and triager unable to answer question   Negative: Caller requesting a renewal or refill of a medicine patient is currently taking   Negative: Caller requesting information unrelated to medicine   Negative: Caller requesting information about COVID-19 Vaccine   Negative: Caller requesting information about Emergency Contraception   Negative: Caller requesting information about Combined Birth Control Pills   Negative: Caller requesting information about Progestin Birth Control Pills   Negative: Caller requesting information about Post-Op pain or medicines   Negative: Caller requesting a prescription antibiotic (such as Penicillin) for Strep throat and has a positive culture result   Negative: Caller requesting a prescription anti-viral med (such as Tamiflu) and has influenza (flu) symptoms   Negative: Immunization reaction suspected   Negative: Rash while taking a medicine or within 3 days of stopping it   Negative: [1] Asthma and [2] having symptoms of asthma (cough, wheezing, etc.)   Negative: [1] Symptom of illness (e.g., headache, abdominal pain, earache, vomiting) AND [2] more than mild   Negative: Breastfeeding questions about mother's medicines and diet   Negative: MORE THAN A DOUBLE DOSE of a prescription or over-the-counter (OTC) drug   Negative: [1] DOUBLE DOSE (an extra dose or lesser amount) of prescription drug AND [2] any symptoms (e.g., dizziness, nausea, pain, sleepiness)   Negative: [1] DOUBLE DOSE (an extra dose or lesser amount) of over-the-counter (OTC) drug AND [2] any symptoms (e.g., dizziness, nausea, pain, sleepiness)   Negative: Took another person's prescription drug   Negative: [1] DOUBLE DOSE (an extra dose or lesser amount) of prescription drug AND [2] NO  symptoms (Exception: a double dose of antibiotics)   Negative: Diabetes drug error or overdose (e.g., took wrong type of insulin or took extra dose)   Negative: [1] Prescription not at pharmacy AND [2] was prescribed by PCP recently (Exception: triager has access to EMR and prescription is recorded there. Go to Home Care and confirm for pharmacy.)   Negative: [1] Pharmacy calling with prescription question AND [2] triager unable to answer question   Negative: [1] Caller has URGENT medicine question about med that PCP or specialist prescribed AND [2] triager unable to answer question   Negative: Medicine patch causing local rash or itching   Negative: [1] Caller has medicine question about med NOT prescribed by PCP AND [2] triager unable to answer question (e.g., compatibility with other med, storage)   Negative: Prescription request for new medicine (not a refill)   Negative: [1] Caller has NON-URGENT medicine question about med that PCP prescribed AND [2] triager unable to answer question   Negative: Caller wants to use a complementary or alternative medicine   Negative: [1] Prescription prescribed recently is not at pharmacy AND [2] triager has access to patient's EMR AND [3] prescription is recorded in the EMR   Negative: [1] DOUBLE DOSE (an extra dose or lesser amount) of over-the-counter (OTC) drug AND [2] NO symptoms   Negative: [1] DOUBLE DOSE (an extra dose or lesser amount) of antibiotic drug AND [2] NO symptoms    Protocols used: Medication Question Call-A-

## 2023-03-15 ENCOUNTER — TELEPHONE (OUTPATIENT)
Dept: PAIN MEDICINE | Facility: CLINIC | Age: 78
End: 2023-03-15
Payer: MEDICARE

## 2023-03-16 ENCOUNTER — HOSPITAL ENCOUNTER (OUTPATIENT)
Dept: RADIOLOGY | Facility: OTHER | Age: 78
Discharge: HOME | End: 2023-03-16
Attending: NURSE PRACTITIONER
Payer: MEDICARE

## 2023-03-16 ENCOUNTER — OFFICE VISIT (OUTPATIENT)
Dept: PAIN MEDICINE | Facility: CLINIC | Age: 78
End: 2023-03-16
Payer: MEDICARE

## 2023-03-16 ENCOUNTER — TELEPHONE (OUTPATIENT)
Dept: PAIN MEDICINE | Facility: CLINIC | Age: 78
End: 2023-03-16
Payer: MEDICARE

## 2023-03-16 VITALS
SYSTOLIC BLOOD PRESSURE: 134 MMHG | HEART RATE: 80 BPM | WEIGHT: 184.75 LBS | BODY MASS INDEX: 34.88 KG/M2 | HEIGHT: 61 IN | DIASTOLIC BLOOD PRESSURE: 72 MMHG

## 2023-03-16 DIAGNOSIS — E11.42 DIABETIC PERIPHERAL NEUROPATHY: ICD-10-CM

## 2023-03-16 DIAGNOSIS — G89.4 CHRONIC PAIN SYNDROME: ICD-10-CM

## 2023-03-16 DIAGNOSIS — Z96.89 S/P INSERTION OF SPINAL CORD STIMULATOR: Primary | ICD-10-CM

## 2023-03-16 DIAGNOSIS — R26.9 GAIT ABNORMALITY: ICD-10-CM

## 2023-03-16 DIAGNOSIS — Z96.89 S/P INSERTION OF SPINAL CORD STIMULATOR: ICD-10-CM

## 2023-03-16 DIAGNOSIS — E13.42 POLYNEUROPATHY DUE TO SECONDARY DIABETES: ICD-10-CM

## 2023-03-16 PROCEDURE — 72070 X-RAY EXAM THORAC SPINE 2VWS: CPT | Mod: 26,,, | Performed by: RADIOLOGY

## 2023-03-16 PROCEDURE — 99024 POSTOP FOLLOW-UP VISIT: CPT | Mod: POP,,, | Performed by: NURSE PRACTITIONER

## 2023-03-16 PROCEDURE — 72100 X-RAY EXAM L-S SPINE 2/3 VWS: CPT | Mod: TC,FY

## 2023-03-16 PROCEDURE — 99024 PR POST-OP FOLLOW-UP VISIT: ICD-10-PCS | Mod: POP,,, | Performed by: NURSE PRACTITIONER

## 2023-03-16 PROCEDURE — 72070 XR THORACIC SPINE AP LATERAL: ICD-10-PCS | Mod: 26,,, | Performed by: RADIOLOGY

## 2023-03-16 PROCEDURE — 72100 X-RAY EXAM L-S SPINE 2/3 VWS: CPT | Mod: 26,,, | Performed by: RADIOLOGY

## 2023-03-16 PROCEDURE — 72100 XR LUMBAR SPINE AP AND LATERAL: ICD-10-PCS | Mod: 26,,, | Performed by: RADIOLOGY

## 2023-03-16 PROCEDURE — 99215 OFFICE O/P EST HI 40 MIN: CPT | Mod: PBBFAC | Performed by: NURSE PRACTITIONER

## 2023-03-16 PROCEDURE — 99999 PR PBB SHADOW E&M-EST. PATIENT-LVL V: CPT | Mod: PBBFAC,,, | Performed by: NURSE PRACTITIONER

## 2023-03-16 PROCEDURE — 99999 PR PBB SHADOW E&M-EST. PATIENT-LVL V: ICD-10-PCS | Mod: PBBFAC,,, | Performed by: NURSE PRACTITIONER

## 2023-03-16 PROCEDURE — 72070 X-RAY EXAM THORAC SPINE 2VWS: CPT | Mod: TC,FY

## 2023-03-16 NOTE — TELEPHONE ENCOUNTER
----- Message from Padmini Huffman sent at 3/16/2023 11:07 AM CDT -----  Regarding: Requesting a call  Contact: INDIRA WATERS [[id number]]  Name of Who is Calling: Indira Waters            What is the request in detail:   Pt wants to know if she has to do Xray before appt, she states the appt was there this morning on my chart but isn't there now. Please advise she is requesting a call urgently in regards       Can the clinic reply by [facility]:          [facility]: [phone]

## 2023-03-16 NOTE — PROGRESS NOTES
Chronic patient Established Note (Follow up visit)    Interval History 3/17/2023:  Mrs Waters presents for follow up of chronic, continuous neuropathic pain to [facility]. She is s/p Nevro SCS trial and unfortunately she did not get the relief she was hoping for and 50% at best relief but this was not consistent. She has no constitutional s/s concerning for infection and denies newer neurological changes since trial. She continues with medication mgt and states Qutenza application has been providing significant benefit.     Interval History 2/10/2023:  Mrs Waters presents for follow up of chronic lower back painn and radicular pain in conjunction with PDN to bilateral feet. She is doing fair with medication mgt of Norco, Robaxin, and Lyrica and does need refill at this time. She denies SE of medications. She is also here for Qutenza application today. She is scheduled to have upcoming SCS trial with Nevro to aslo address her PDN.     Interval History 12/13/2022:  Mrs Waters presents for follow up of chronic pain. She is ready to repeat Qutenza application as it has been >91 days and she had significant improvement of symptoms of PDN. She recently had repeat A1C and just above 10.0 but is decreasing. She continues to take medication with benefit and denies SE of medication. Medication regimen include Norco 10/325mg TID, Robaxin 500mg and Lyrica 150mg.     Interval History 10/12/2022:  Mrs Waters presents for follow up of chronic neuropathy. She is s/p qutenza patch placement with improved functioning and neuropathy control. Unfortunately her A1C was above 11 which inhibits her from Nevro SCS trial at this time. She is eager to have SCS trial. She denies new areas of pain or neurological changes. She continued to take  Norco 10/325mg TID, Robaxin 500mg and Lyrica 150mg TID with benefit and denies significant SE of medications.     Interval History 9/8/2022:  Mrs Waters presents for follow up of chronic lower back painn and  radicular pain in conjunction with PDN to bilateral feet. She is doing fair with medication mgt of Norco, Robaxin, and Lyrica and does need refill at this time. She denies SE of medications. She is patiently awaiting her A1C to become lower than 10 to proceed with SCS trial.     Interval History 8/12/2022:  Mrs Waters presents for delayed FU. She has had continuous pain to lumbar and radicular pain but also peripheral neuropathy complaints. Over interval she has had CVA but doing fair. Her A1C has unfortunately elevated above 10 at this time and SCS trial placed on hold but phychiatric evaluation complete. She continues to take Norco 10/325mg TID, Robaxin 500mg TID and Lyrica 150mg TID with some benefit and denies SE of medications. No s/s concerning for cauda equina.     Interval History 4/12/2022:  Patient returns to clinic today for follow-up. Her neuropathic pain continues to be an issue for her, but she says that she is able to manage. She is taking Norco 10-325mg TID, Robaxin 500mg TID, and Lyrica 150mg TID without any adverse side effects. She denies any changes in her symptoms. She would like to proceed with SCS trial. Discussed last time that her HbA1c should be <10, was 9.8 on most recent labs. Seen by psychology and cleared for SCS.     Interval History 2/14/2022:  Mrs Waters presents for follow up. Pt states overall neuropathy continues. Since last visit she has been stable with medication mgt and takign Norco 10/325mg TID, Robaxin 500mg TID and Lyrica 150mg TID. She denies new areas of pain or neurological changes. Medication adequate to control pain without adverse SE.      Interval History 12/21/2021:  Pt presents for urgent evaluation s/p fall in kitchen last night. Pt unsure of LOC or head trauma but states some amnesia of events. Pt is having left knee pain, B ankle pain L>R and lower back/buttock pain. Daughter further states tremor like activity last night. During visit daughter asked for bedside  commode due to inability to ambulate.  Pt during visit appeared somnolent, pale and began vomiting. Discussed going to ER for further evaluation.      Interval History 12/14/2021:  Mrs Waters presents for follow up of chronic pain complaints. She is hopeful she will have A1C lower soon to move forward with SCS. She is doing fair with medication mgt alone at this time and denies SE of medications. Pt is currently taking Norco 10/325mg TID PRN, Lyrica 150mg TID, and Robaxin 500mg TID. She denies new areas of pain or neurological changes.      Interval History 10/14/2021:  The Pt presents for follow up and s/p B Lumbar sympathetic blocks. Pt states this has done well but ready to proceed with SCS trial. She is aware A1C must be under tight control and Pt and daughter re-iterate knowing this. Pt also mentions DKA admission and diagnosis of vasculitis as well over interval. Pt continues to take hydrocodone 10/325 mg TID PRN, Lyrica 150 mg TID, and Robaxin 500mg TID. She denies any SE of medication, denies new neurological changes.      Interval Hx 09/16/2021  Indira Waters presents to the clinic for a follow-up appointment for f/u after bilateral lumbar sympathetic block on 8/25/21.Patient reports >50% relief after lumbar sympathetic block that lasted 2 weeks when she then developed a UTI/DKA, was admitted to the hospital and pain recurred.  Current pain intensity is 8/10.     Interval History 8/12/2021:  Indira Waters presents to the clinic for a follow-up appointment for BLE diabetic neuropathy, painful. Continues with Norco 10/325mg, Lyrica 150mg TID, Robaxin 750mg daily PRN. She had to cancel previously scheduled lumbar sympathetic block 2/2 lithotripsy for painful kidney stones, which have now passed. She still has intermittent pain with urination but overall that pain is improving. She had to cancel previous angiogram for this same reason - this has not been rescheduled yet. She denies any change in  "her LE pain. Denies any new neurological sxs in BLEs.     Interval History 6/10/21:  Indira Waters presents to the clinic for a 2 week follow-up appointment from lumbar sympathetic nerve block in early May. She reports minimal relief from this intervention, but did note that it helped some, briefly. Since the last visit, Indira Waters states the pain has been persistant. Current pain intensity is 10/10. Patient has chronic generalized diffuse pain, most pronounced in her BL lower extremities 2/2 diabetic neuropathy. HSe states that the pain is a little better than at her last visit. Most days are 10/10, but some days are better than others. Her pain is aggravated by exertion, walking, or sitting/standing for extended periods of time. He pain is mildly improved by rest and medications. She is currently taking Lyrica 150 PO TID, Zoloft, and Norco 10-325mg TID PRN. She states that the pain meds are helping but she is still constantly in pain and unable to participate in her ADLs. She has now established care with PCP for assistance w/ poorly controlled T2DM, last A1c 11.4. She has not yet established care w/ Rheumatology for fibromyalgia management.    Interval HPI 4/15/21:  Patient returns for follow up of chronic generalized diffuse pain. At the last visit, had EMG (not yet completed), lumbar and hip x-ray imaging ordered. She was also referred to Rheumatology for fibromyalgia management and referral to PCP for DM2 management and weaning of zoloft for transition to cymbalta for treatment of neuropathy. She had her Lyrica increased to 150mg PO TID, and prescribed Norco 10mg TID with opioid contract signed. She has been taking Norco 10mg TID and it has been helping her "bad" pains but does not take all of her pain away. She has not yet been set up with her new PCP or rheumatologist yet for fibromyalgia. Still continues to have generalized pain everywhere including feet, hips, legs, lower and upper back, neck and " shoulder pain. Continues to have neuropathy in the bilateral lower extremities due to uncontrolled DM2.    Initial Visit 3/11/21:  Indira Waters presents to the clinic for the evaluation of chronic pain. Complaining of pain everywhere including feet, legs, hips, lower and upper back, neck, shoulder. Pain started 5+ years ago. Pain 10/10 at worse. She was referred here by her PCP Dr. Ramos who she has been seeing for over 6 years. She states her pain is aggravated by any physical activity/movement. She takes lyrica, robaxin, and Norco 10 TID with mild relief of pain. Per patient, she was referred here by her PCP for medication refill. She has not tried physical therapy for several years, she states it did not help last time she was in PT. She says she is trying to exercise daily by doing yoga. She walks with a walker. She has numerous comorbidities including DM2 (Last A1C 9+ per prior family medicine note in Jan 2021), fibromyalgia, lupus, DDD. She states she would like to find a new PCP as she no longer lives near her old one. Patient denies night fever/night sweats, urinary incontinence, bowel incontinence and significant weight loss.      Pain Disability Index Review:  Last 3 PDI Scores 3/16/2023 2/10/2023 10/12/2022   Pain Disability Index (PDI) 63 50 40     Pain Medications:  Norco 10-325mg TID, Robaxin 500mg TID, and Lyrica 150mg TID    Opioid Contract: yes     report:  Reviewed and consistent with medication use as prescribed.    Pain Procedures:    - reports possible back injection 10+ years ago  - Lumbar sympathetic nerve block 05/05/21 - Dr. Pereira - minimal relief    Physical Therapy/Home Exercise: no     Imaging:   Hip X-ray 4/7/21  FINDINGS:  Osseous structures appear intact without evidence of fracture or osseous destructive process.  No apparent dislocation.     Modest degenerative change or significant joint space narrowing.     Lumbar X-ray 4/7/21  FINDINGS:  Diffuse bony demineralization.   Vertebral bodies are normal in height without evidence of fracture.  No pars defects.     Normal sagittal alignment is preserved.  No spondylolisthesis. No abnormal translation with flexion and extension.     Intervertebral disc heights are well maintained.  Mild facet arthropathy in the lower lumbar spine.     Mild scattered vascular calcification.     Impression:     No evidence of fracture or malalignment.     Mild facet arthropathy in the lower lumbar spine.     Diffuse bony demineralization.  Consider correlation with DEXA.    Allergies:   Review of patient's allergies indicates:   Allergen Reactions    Bleach (sodium hypochlorite) Shortness Of Breath    Nitrofurantoin macrocrystalline Anaphylaxis    Lipitor [atorvastatin] Diarrhea and Rash    Nsaids (non-steroidal anti-inflammatory drug)     Pcn [penicillins]     Toradol [ketorolac]        Current Medications:   Current Outpatient Medications   Medication Sig Dispense Refill    acetaminophen (TYLENOL) 500 MG tablet Take 2 tablets (1,000 mg total) by mouth every 8 (eight) hours as needed for Pain.  0    allopurinoL (ZYLOPRIM) 300 MG tablet Take 1 tablet (300 mg total) by mouth once daily. 90 tablet 1    aspirin (ECOTRIN) 81 MG EC tablet Take 1 tablet (81 mg total) by mouth once daily. 30 tablet 0    atorvastatin (LIPITOR) 40 MG tablet atorvastatin 40 mg tablet      blood sugar diagnostic Strp 1 each by Misc.(Non-Drug; Combo Route) route 4 (four) times daily. 200 each 0    blood-glucose meter Misc Follow package directions (Patient taking differently: Follow package directions) 1 each 0    cloNIDine (CATAPRES) 0.1 MG tablet Take 1 tablet (0.1 mg total) by mouth 2 (two) times daily. 180 tablet 0    evolocumab (REPATHA SURECLICK) 140 mg/mL PnIj Inject 1 mL (140 mg total) into the skin every 14 (fourteen) days. 8 each 3    fenofibrate micronized (LOFIBRA) 134 MG Cap Take 1 capsule (134 mg total) by mouth daily with breakfast. 90 capsule 3    furosemide (LASIX) 40  "MG tablet Take 1 tablet (40 mg total) by mouth once daily. 30 tablet 6    HYDROcodone-acetaminophen (NORCO)  mg per tablet Take 1 tablet by mouth every 8 (eight) hours as needed for Pain. 90 tablet 0    hydrocortisone (ANUSOL-HC) 2.5 % rectal cream Procto-Med HC 2.5 % topical cream perineal applicator      insulin aspart U-100 (NOVOLOG) 100 unit/mL (3 mL) InPn pen Inject 12 Units into the skin 3 (three) times daily with meals. Plus correction scale. Max TDD 40units. 15 mL 3    insulin detemir U-100 (LEVEMIR FLEXTOUCH U-100 INSULN) 100 unit/mL (3 mL) InPn pen Inject 20 Units into the skin 2 (two) times daily. 15 mL 3    ipratropium-albuteroL (COMBIVENT)  mcg/actuation inhaler Inhale 1 puff into the lungs by mouth every 6 (six) hours. 4 g 0    losartan (COZAAR) 50 MG tablet Take 1 tablet (50 mg total) by mouth once daily. 90 tablet 0    methocarbamoL (ROBAXIN) 500 MG Tab Take 1 tablet (500 mg total) by mouth 3 (three) times daily as needed (muscle spasm). 90 tablet 2    metoclopramide HCl (REGLAN) 5 MG tablet Take 1 tablet (5 mg total) by mouth 4 (four) times daily as needed (nausea, prevention). 30 tablet 3    metoprolol succinate (TOPROL-XL) 100 MG 24 hr tablet Take 1 tablet (100 mg total) by mouth once daily. 90 tablet 3    NIFEdipine (PROCARDIA-XL) 30 MG (OSM) 24 hr tablet Take 1 tablet (30 mg total) by mouth 2 (two) times a day. 180 tablet 0    nitroGLYCERIN (NITROSTAT) 0.4 MG SL tablet Place 1 tablet (0.4 mg total) under the tongue every 5 (five) minutes as needed for Chest pain. 25 tablet 5    pantoprazole (PROTONIX) 40 MG tablet Take 1 tablet (40 mg total) by mouth once daily. 30 tablet 0    pen needle, diabetic (BD ULTRA-FINE HIEN PEN NEEDLE) 32 gauge x 5/32" Ndle 1 each by Misc.(Non-Drug; Combo Route) route 4 (four) times daily. 400 each 3    pregabalin (LYRICA) 150 MG capsule Take 1 capsule (150 mg total) by mouth 3 (three) times daily. 90 capsule 2    senna-docusate 8.6-50 mg (PERICOLACE) " 8.6-50 mg per tablet Take 1 tablet by mouth 2 (two) times daily as needed for Constipation. 60 tablet 0    sertraline (ZOLOFT) 100 MG tablet Take 1 tablet (100 mg total) by mouth once daily. 90 tablet 3    sulfamethoxazole-trimethoprim 800-160mg (BACTRIM DS) 800-160 mg Tab sulfamethoxazole 800 mg-trimethoprim 160 mg tablet      teriparatide 20 mcg/dose (620mcg/2.48mL) PnIj Inject 20 mcg into the skin once daily. 2.48 mL 11    triamcinolone acetonide 0.1% (KENALOG) 0.1 % cream Apply to affected areas of body twice daily as needed rash. Do not use on face, underarms, or groin. 454 g 0    TRUEPLUS LANCETS 30 gauge Misc TEST AS DIRECTED FOUR TIMES DAILY 200 each 2    blood-glucose meter,continuous (DEXCOM G6 ) Misc 1 each by Misc.(Non-Drug; Combo Route) route continuous prn. 1 each PRN    blood-glucose sensor (DEXCOM G6 SENSOR) Nicole 3 each by Misc.(Non-Drug; Combo Route) route continuous prn. Change every 10 days. 3 each PRN    blood-glucose transmitter (DEXCOM G6 TRANSMITTER) Nicole 1 each by Misc.(Non-Drug; Combo Route) route continuous prn. 1 each PRN     No current facility-administered medications for this visit.       REVIEW OF SYSTEMS:    GENERAL:  No weight loss, malaise or fevers.  HEENT:  Negative for frequent or significant headaches.  NECK:  Negative for lumps, goiter, pain and significant neck swelling.  RESPIRATORY:  Negative for cough, wheezing or shortness of breath.  CARDIOVASCULAR:  Negative for chest pain, leg swelling or palpitations.  GI:  Negative for abdominal discomfort, blood in stools or black stools or change in bowel habits.  MUSCULOSKELETAL:  See HPI.  SKIN:  Negative for lesions, rash, and itching.  PSYCH:  Negative for sleep disturbance, mood disorder and recent psychosocial stressors.  HEMATOLOGY/LYMPHOLOGY:  Negative for prolonged bleeding, bruising easily or swollen nodes.  NEURO:   No history of headaches, syncope, paralysis, seizures or tremors.  All other reviewed and negative  other than HPI.    Past Medical History:  Past Medical History:   Diagnosis Date    Arthritis     Back pain     Cancer     ovarian    Cervical cancer     Depression     Diabetes mellitus     Fibromyalgia     Heart attack     Hyperlipidemia     Hypertension     Lupus     Stroke     slight left sided weakness       Past Surgical History:  Past Surgical History:   Procedure Laterality Date    APPENDECTOMY       SECTION      2    CORONARY ANGIOGRAPHY N/A 2022    Procedure: ANGIOGRAM, CORONARY ARTERY;  Surgeon: Jose L Méndez MD;  Location: [facility] CATH LAB;  Service: Cardiology;  Laterality: N/A;    HYSTERECTOMY      with USO for cervical cancer    INJECTION OF ANESTHETIC AGENT AROUND NERVE Bilateral 2021    Procedure: BLOCK, NERVE, SYMPATHIC;  Surgeon: Holden Pereira MD;  Location: [facility] PAIN MGT;  Service: Pain Management;  Laterality: Bilateral;    INJECTION OF ANESTHETIC AGENT AROUND NERVE N/A 2021    Procedure: BLOCK, NERVE, SYMPATHETIC  need consent;  Surgeon: Holden Pereira MD;  Location: [facility] PAIN MGT;  Service: Pain Management;  Laterality: N/A;    FL EVAL,SWALLOW FUNCTION,CINE/VIDEO RECORD  2021         TONSILLECTOMY      TRIAL OF SPINAL CORD NERVE STIMULATOR N/A 3/8/2023    Procedure: LUMBAR SPINAL CORD STIMULATOR TRIAL NEVRO REP PATIENT STATES SHE NO LONGER TAKES PLAVIX;  Surgeon: Holden Pereira MD;  Location: [facility] PAIN MGT;  Service: Pain Management;  Laterality: N/A;       Family History:  Family History   Problem Relation Age of Onset    COPD Mother     Lupus Mother     Hernia Mother     Uterine cancer Mother         vs cervical cancer    Ovarian cancer Mother     Diabetes Father     Coronary artery disease Father     Colon cancer Maternal Grandmother         in her 50's       Social History:  Social History     Socioeconomic History    Marital status:    Tobacco Use    Smoking status: Former     Types: Cigarettes     Quit date: 2020     Years since  "quittin.3    Smokeless tobacco: Never   Substance and Sexual Activity    Alcohol use: Yes     Comment: occasionally    Drug use: No    Sexual activity: Not Currently   Social History Narrative    Lives with daughter. .      Social Determinants of Health     Financial Resource Strain: Low Risk     Difficulty of Paying Living Expenses: Not hard at all   Food Insecurity: No Food Insecurity    Worried About Running Out of Food in the Last Year: Never true    Ran Out of Food in the Last Year: Never true   Transportation Needs: No Transportation Needs    Lack of Transportation (Medical): No    Lack of Transportation (Non-Medical): No   Physical Activity: Insufficiently Active    Days of Exercise per Week: 3 days    Minutes of Exercise per Session: 20 min   Stress: No Stress Concern Present    Feeling of Stress : Not at all   Social Connections: Socially Isolated    Frequency of Communication with Friends and Family: More than three times a week    Frequency of Social Gatherings with Friends and Family: Never    Attends Spiritism Services: Never    Active Member of Clubs or Organizations: No    Attends Club or Organization Meetings: Never    Marital Status:    Housing Stability: Low Risk     Unable to Pay for Housing in the Last Year: No    Number of Places Lived in the Last Year: 1    Unstable Housing in the Last Year: No       OBJECTIVE:    /72 (BP Location: Left arm, Patient Position: Sitting, BP Method: Large (Automatic))   Pulse 80   Ht 5' 1" (1.549 m)   Wt 83.8 kg (184 lb 11.9 oz)   BMI 34.91 kg/m²     GEN:  Well developed, well nourished.  No acute distress.   HEENT:  No trauma.  Mucous membranes moist.  Nares patent bilaterally.  PSYCH: Normal affect. Thought content appropriate.  CHEST:  Breathing symmetric.  No audible wheezing.  ABD: Soft, non-distended.  SKIN:  SCS site without drainage or signs of infection.  Leads removed without difficulty with tips intact.  Area cleaned with " alcohol and Bacitracin applied.  EXT:  No cyanosis, clubbing, or edema.  No color change or changes in nail or hair growth.  NEURO/MUSCULOSKELETAL:  Fully alert, oriented, and appropriate. Speech normal alonso. No cranial nerve deficits.   Gait: in Hospital  for transport     ASSESSMENT: 78 y.o. year old female with chronic pain, consistent with:     1. S/P insertion of spinal cord stimulator  X-Ray Thoracic Spine AP Lateral    X-Ray Lumbar Spine AP And Lateral      2. Diabetic peripheral neuropathy        3. Chronic pain syndrome        4. Gait abnormality        5. Polyneuropathy due to secondary diabetes                PLAN:     - Prior records reviewed  - Prior imaging reviewed  - s/p Nevro SCS trial and no significant relief  - Qutenza does aid in relief of PDN and will continue this q91 days   - I have stressed the importance of physical activity and a home exercise plan to help with pain and improve health.  - Patient can continue with medications for now since they are providing benefits, using them appropriately, and without side effects.  - UDS 2/14/22 reviewed.   - Continue Robaxin 500mg TID #90  - Refilled Norco 10/325mg TID #90   - Continue Lyrica 150mg TID #90  - RTC Qutenza applications and med mgt refills   - Counseled patient regarding the importance of activity modification, constant sleeping habits and physical therapy.    The above plan and management options were discussed at length with patient. Patient is in agreement with the above and verbalized understanding.    Colin Gibbs NP  03/17/2023

## 2023-03-22 ENCOUNTER — SPECIALTY PHARMACY (OUTPATIENT)
Dept: PHARMACY | Facility: CLINIC | Age: 78
End: 2023-03-22
Payer: MEDICARE

## 2023-03-22 NOTE — TELEPHONE ENCOUNTER
Specialty Pharmacy - Refill Coordination    Specialty Medication Orders Linked to Encounter      Flowsheet Row Most Recent Value   Medication #1 teriparatide 20 mcg/dose (620mcg/2.48mL) PnIj (Order#[id number], Rx#[id number])            Refill Questions - Documented Responses      Flowsheet Row Most Recent Value   Patient Availability and HIPAA Verification    Does patient want to proceed with activity? Yes   HIPAA/medical authority confirmed? Yes   Relationship to patient of person spoken to? Child   Refill Screening Questions    Changes to allergies? No   Changes to medications? Yes  [Patient had a spinal cord stimulator placed- she was placed on Bactrim after procedure - no DDI with [facility]]   New conditions since last clinic visit? No   Unplanned office visit, urgent care, ED, or hospital admission in the last 4 weeks? Yes  [Patient was hospitalized for spinal cord stimulator placement.]   How does patient/caregiver feel medication is working? Good   Financial problems or insurance changes? No   How many doses of your specialty medications were missed in the last 4 weeks? 0   Would patient like to speak to a pharmacist? No   When does the patient need to receive the medication? 03/28/23   Refill Delivery Questions    How will the patient receive the medication? MEDRx   When does the patient need to receive the medication? 03/28/23   Shipping Address Home   Address in [facility] confirmed and updated if neccessary? Yes   Expected Copay ($) 4.3   Is the patient able to afford the medication copay? Yes   Payment Method CC on file   Days supply of Refill 28   Supplies needed? No supplies needed   Refill activity completed? Yes   Refill activity plan Refill scheduled   Shipment/Pickup Date: 03/24/23            Current Outpatient Medications   Medication Sig    acetaminophen (TYLENOL) 500 MG tablet Take 2 tablets (1,000 mg total) by mouth every 8 (eight) hours as needed for Pain.    allopurinoL (ZYLOPRIM) 300 MG  tablet Take 1 tablet (300 mg total) by mouth once daily.    aspirin (ECOTRIN) 81 MG EC tablet Take 1 tablet (81 mg total) by mouth once daily.    atorvastatin (LIPITOR) 40 MG tablet atorvastatin 40 mg tablet    blood sugar diagnostic Strp 1 each by Misc.(Non-Drug; Combo Route) route 4 (four) times daily.    blood-glucose meter Misc Follow package directions (Patient taking differently: Follow package directions)    blood-glucose meter,continuous (DEXCOM G6 ) Misc 1 each by Misc.(Non-Drug; Combo Route) route continuous prn.    blood-glucose sensor (DEXCOM G6 SENSOR) Nicole 3 each by Misc.(Non-Drug; Combo Route) route continuous prn. Change every 10 days.    blood-glucose transmitter (DEXCOM G6 TRANSMITTER) Nicole 1 each by Misc.(Non-Drug; Combo Route) route continuous prn.    cloNIDine (CATAPRES) 0.1 MG tablet Take 1 tablet (0.1 mg total) by mouth 2 (two) times daily.    evolocumab (REPATHA SURECLICK) 140 mg/mL PnIj Inject 1 mL (140 mg total) into the skin every 14 (fourteen) days.    fenofibrate micronized (LOFIBRA) 134 MG Cap Take 1 capsule (134 mg total) by mouth daily with breakfast.    furosemide (LASIX) 40 MG tablet Take 1 tablet (40 mg total) by mouth once daily.    HYDROcodone-acetaminophen (NORCO)  mg per tablet Take 1 tablet by mouth every 8 (eight) hours as needed for Pain.    hydrocortisone (ANUSOL-HC) 2.5 % rectal cream Procto-Med HC 2.5 % topical cream perineal applicator    insulin aspart U-100 (NOVOLOG) 100 unit/mL (3 mL) InPn pen Inject 12 Units into the skin 3 (three) times daily with meals. Plus correction scale. Max TDD 40units.    insulin detemir U-100 (LEVEMIR FLEXTOUCH U-100 INSULN) 100 unit/mL (3 mL) InPn pen Inject 20 Units into the skin 2 (two) times daily.    ipratropium-albuteroL (COMBIVENT)  mcg/actuation inhaler Inhale 1 puff into the lungs by mouth every 6 (six) hours.    losartan (COZAAR) 50 MG tablet Take 1 tablet (50 mg total) by mouth once daily.    methocarbamoL  "(ROBAXIN) 500 MG Tab Take 1 tablet (500 mg total) by mouth 3 (three) times daily as needed (muscle spasm).    metoclopramide HCl (REGLAN) 5 MG tablet Take 1 tablet (5 mg total) by mouth 4 (four) times daily as needed (nausea, prevention).    metoprolol succinate (TOPROL-XL) 100 MG 24 hr tablet Take 1 tablet (100 mg total) by mouth once daily.    NIFEdipine (PROCARDIA-XL) 30 MG (OSM) 24 hr tablet Take 1 tablet (30 mg total) by mouth 2 (two) times a day.    nitroGLYCERIN (NITROSTAT) 0.4 MG SL tablet Place 1 tablet (0.4 mg total) under the tongue every 5 (five) minutes as needed for Chest pain.    pantoprazole (PROTONIX) 40 MG tablet Take 1 tablet (40 mg total) by mouth once daily.    pen needle, diabetic (BD ULTRA-FINE HIEN PEN NEEDLE) 32 gauge x 5/32" Ndle 1 each by Misc.(Non-Drug; Combo Route) route 4 (four) times daily.    pregabalin (LYRICA) 150 MG capsule Take 1 capsule (150 mg total) by mouth 3 (three) times daily.    senna-docusate 8.6-50 mg (PERICOLACE) 8.6-50 mg per tablet Take 1 tablet by mouth 2 (two) times daily as needed for Constipation.    sertraline (ZOLOFT) 100 MG tablet Take 1 tablet (100 mg total) by mouth once daily.    sulfamethoxazole-trimethoprim 800-160mg (BACTRIM DS) 800-160 mg Tab sulfamethoxazole 800 mg-trimethoprim 160 mg tablet    teriparatide 20 mcg/dose (620mcg/2.48mL) PnIj Inject 20 mcg into the skin once daily.    triamcinolone acetonide 0.1% (KENALOG) 0.1 % cream Apply to affected areas of body twice daily as needed rash. Do not use on face, underarms, or groin.    TRUEPLUS LANCETS 30 gauge Misc TEST AS DIRECTED FOUR TIMES DAILY   Last reviewed on 3/17/2023  7:50 AM by Colin Gibbs NP    Review of patient's allergies indicates:   Allergen Reactions    Bleach (sodium hypochlorite) Shortness Of Breath    Nitrofurantoin macrocrystalline Anaphylaxis    Lipitor [atorvastatin] Diarrhea and Rash    Nsaids (non-steroidal anti-inflammatory drug)     Pcn [penicillins]     Toradol " [ketorolac]     Last reviewed on  3/17/2023 7:50 AM by Colin Gibbs      Tasks added this encounter   4/18/2023 - Refill Call (Auto Added)   Tasks due within next 3 months   No tasks due.     Christina Hammer, PharmD  [facility]  [address]  [facility] [address]  Phone: [phone]  Fax: [phone]

## 2023-03-27 DIAGNOSIS — G89.4 CHRONIC PAIN DISORDER: ICD-10-CM

## 2023-03-27 DIAGNOSIS — D69.2 PALPABLE PURPURA: ICD-10-CM

## 2023-03-27 DIAGNOSIS — E08.42 DIABETIC POLYNEUROPATHY ASSOCIATED WITH DIABETES MELLITUS DUE TO UNDERLYING CONDITION: ICD-10-CM

## 2023-03-27 DIAGNOSIS — G89.4 CHRONIC PAIN SYNDROME: ICD-10-CM

## 2023-03-27 RX ORDER — PREGABALIN 150 MG/1
150 CAPSULE ORAL 3 TIMES DAILY
Qty: 90 CAPSULE | Refills: 2 | Status: SHIPPED | OUTPATIENT
Start: 2023-03-27 | End: 2023-05-31 | Stop reason: SDUPTHER

## 2023-03-27 RX ORDER — METHOCARBAMOL 500 MG/1
500 TABLET, FILM COATED ORAL 3 TIMES DAILY PRN
Qty: 90 TABLET | Refills: 2 | Status: SHIPPED | OUTPATIENT
Start: 2023-03-27 | End: 2023-08-31 | Stop reason: SDUPTHER

## 2023-03-27 RX ORDER — TRIAMCINOLONE ACETONIDE 1 MG/G
CREAM TOPICAL
Qty: 454 G | Refills: 0 | OUTPATIENT
Start: 2023-03-27

## 2023-04-03 ENCOUNTER — SPECIALTY PHARMACY (OUTPATIENT)
Dept: PHARMACY | Facility: CLINIC | Age: 78
End: 2023-04-03
Payer: MEDICARE

## 2023-04-03 NOTE — TELEPHONE ENCOUNTER
Specialty Pharmacy - Refill Coordination    Specialty Medication Orders Linked to Encounter      Flowsheet Row Most Recent Value   Medication #1 evolocumab (REPATHA SURECLICK) 140 mg/mL PnIj (Order#[id number], Rx#[id number])            Refill Questions - Documented Responses      Flowsheet Row Most Recent Value   Patient Availability and HIPAA Verification    Does patient want to proceed with activity? Yes   HIPAA/medical authority confirmed? Yes   Relationship to patient of person spoken to? Child  [daughter]   Refill Screening Questions    Would patient like to speak to a pharmacist? No   When does the patient need to receive the medication? 04/10/23   Refill Delivery Questions    How will the patient receive the medication? MEDRx   When does the patient need to receive the medication? 04/10/23   Shipping Address Home   Address in [facility] confirmed and updated if neccessary? Yes   Expected Copay ($) 0   Is the patient able to afford the medication copay? Yes   Payment Method zero copay   Days supply of Refill 28   Supplies needed? No supplies needed   Refill activity completed? Yes   Refill activity plan Refill scheduled   Shipment/Pickup Date: 04/05/23            Current Outpatient Medications   Medication Sig    acetaminophen (TYLENOL) 500 MG tablet Take 2 tablets (1,000 mg total) by mouth every 8 (eight) hours as needed for Pain.    allopurinoL (ZYLOPRIM) 300 MG tablet Take 1 tablet (300 mg total) by mouth once daily.    aspirin (ECOTRIN) 81 MG EC tablet Take 1 tablet (81 mg total) by mouth once daily.    atorvastatin (LIPITOR) 40 MG tablet atorvastatin 40 mg tablet    blood sugar diagnostic Strp 1 each by Misc.(Non-Drug; Combo Route) route 4 (four) times daily.    blood-glucose meter Misc Follow package directions (Patient taking differently: Follow package directions)    blood-glucose meter,continuous (DEXCOM G6 ) Misc 1 each by Misc.(Non-Drug; Combo Route) route continuous prn.     blood-glucose sensor (DEXCOM G6 SENSOR) Nicole 3 each by Misc.(Non-Drug; Combo Route) route continuous prn. Change every 10 days.    blood-glucose transmitter (DEXCOM G6 TRANSMITTER) Nicole 1 each by Misc.(Non-Drug; Combo Route) route continuous prn.    cloNIDine (CATAPRES) 0.1 MG tablet Take 1 tablet (0.1 mg total) by mouth 2 (two) times daily.    evolocumab (REPATHA SURECLICK) 140 mg/mL PnIj Inject 1 mL (140 mg total) into the skin every 14 (fourteen) days.    fenofibrate micronized (LOFIBRA) 134 MG Cap Take 1 capsule (134 mg total) by mouth daily with breakfast.    furosemide (LASIX) 40 MG tablet Take 1 tablet (40 mg total) by mouth once daily.    HYDROcodone-acetaminophen (NORCO)  mg per tablet Take 1 tablet by mouth every 8 (eight) hours as needed for Pain.    hydrocortisone (ANUSOL-HC) 2.5 % rectal cream Procto-Med HC 2.5 % topical cream perineal applicator    insulin aspart U-100 (NOVOLOG) 100 unit/mL (3 mL) InPn pen Inject 12 Units into the skin 3 (three) times daily with meals. Plus correction scale. Max TDD 40units.    insulin detemir U-100, Levemir, (LEVEMIR FLEXPEN) 100 unit/mL (3 mL) InPn pen Inject 20 units into the skin 2 (two) times daily.    insulin detemir U-100, Levemir, (LEVEMIR FLEXTOUCH U-100 INSULN) 100 unit/mL (3 mL) InPn pen Inject 20 Units into the skin 2 (two) times daily.    ipratropium-albuteroL (COMBIVENT)  mcg/actuation inhaler Inhale 1 puff into the lungs by mouth every 6 (six) hours.    losartan (COZAAR) 50 MG tablet Take 1 tablet (50 mg total) by mouth once daily.    methocarbamoL (ROBAXIN) 500 MG Tab Take 1 tablet (500 mg total) by mouth 3 (three) times daily as needed (muscle spasm).    metoclopramide HCl (REGLAN) 5 MG tablet Take 1 tablet (5 mg total) by mouth 4 (four) times daily as needed (nausea, prevention).    metoprolol succinate (TOPROL-XL) 100 MG 24 hr tablet Take 1 tablet (100 mg total) by mouth once daily.    NIFEdipine (PROCARDIA-XL) 30 MG (OSM) 24 hr tablet  "Take 1 tablet (30 mg total) by mouth 2 (two) times a day.    nitroGLYCERIN (NITROSTAT) 0.4 MG SL tablet Place 1 tablet (0.4 mg total) under the tongue every 5 (five) minutes as needed for Chest pain.    pantoprazole (PROTONIX) 40 MG tablet Take 1 tablet (40 mg total) by mouth once daily.    pen needle, diabetic (BD ULTRA-FINE HIEN PEN NEEDLE) 32 gauge x 5/32" Ndle 1 each by Misc.(Non-Drug; Combo Route) route 4 (four) times daily.    pregabalin (LYRICA) 150 MG capsule Take 1 capsule (150 mg total) by mouth 3 (three) times daily.    senna-docusate 8.6-50 mg (PERICOLACE) 8.6-50 mg per tablet Take 1 tablet by mouth 2 (two) times daily as needed for Constipation.    sertraline (ZOLOFT) 100 MG tablet Take 1 tablet (100 mg total) by mouth once daily.    sulfamethoxazole-trimethoprim 800-160mg (BACTRIM DS) 800-160 mg Tab sulfamethoxazole 800 mg-trimethoprim 160 mg tablet    teriparatide 20 mcg/dose (620mcg/2.48mL) PnIj Inject 20 mcg into the skin once daily.    triamcinolone acetonide 0.1% (KENALOG) 0.1 % cream Apply to affected areas of body twice daily as needed rash. Do not use on face, underarms, or groin.    TRUEPLUS LANCETS 30 gauge Misc TEST AS DIRECTED FOUR TIMES DAILY   Last reviewed on 3/17/2023  7:50 AM by Colin Gibbs NP    Review of patient's allergies indicates:   Allergen Reactions    Bleach (sodium hypochlorite) Shortness Of Breath    Nitrofurantoin macrocrystalline Anaphylaxis    Lipitor [atorvastatin] Diarrhea and Rash    Nsaids (non-steroidal anti-inflammatory drug)     Pcn [penicillins]     Toradol [ketorolac]     Last reviewed on  3/17/2023 7:50 AM by Colin Gibbs      Tasks added this encounter   5/1/2023 - Refill Call (Auto Added)   Tasks due within next 3 months   No tasks due.     Alesha De La Torre, Patient Care Assistant  [facility]  [address] [place] [address]  Phone: [phone]  Fax: [phone]        "

## 2023-04-06 ENCOUNTER — TELEPHONE (OUTPATIENT)
Dept: FAMILY MEDICINE | Facility: CLINIC | Age: 78
End: 2023-04-06
Payer: MEDICARE

## 2023-04-06 NOTE — TELEPHONE ENCOUNTER
Called the Patient, but could not leave a voicemail not setup yet.  Called the home number and no answer.  Sent a detailed message thru [employer] requesting the Patient, to call the office back to be scheduled.

## 2023-04-11 ENCOUNTER — TELEPHONE (OUTPATIENT)
Dept: FAMILY MEDICINE | Facility: CLINIC | Age: 78
End: 2023-04-11
Payer: MEDICARE

## 2023-04-11 ENCOUNTER — PATIENT MESSAGE (OUTPATIENT)
Dept: FAMILY MEDICINE | Facility: CLINIC | Age: 78
End: 2023-04-11
Payer: MEDICARE

## 2023-04-11 NOTE — TELEPHONE ENCOUNTER
----- Message from Larry Goodwin sent at 4/11/2023  1:38 PM CDT -----  Regarding: CAll BAck  Name of Who is Calling: Esha Daughter              What is the request in detail: Esha Requesting a appointment for Pt Nothing available in system. Please assist               Can the clinic reply by [facility]: No              What Number to Call Back if not in [facility]: [phone]

## 2023-04-11 NOTE — TELEPHONE ENCOUNTER
----- Message from Aries Gibbs LPN sent at 4/11/2023  2:51 PM CDT -----    ----- Message -----  From: Sravanthi Clemons  Sent: 4/11/2023   1:31 PM CDT  To: [facility] Staff    .Type: Patient Call Back    Who called: Daughter    What is the request in detail: Calling to schedule EAWV     Can the clinic reply by [facility]? No     Would the patient rather a call back or a response via [facility]? Call back    Best call back number:.[phone] (home) [phone] (work)      Additional Information:

## 2023-04-11 NOTE — TELEPHONE ENCOUNTER
Called the Patient , but could not leave a message no voicemail set up yet on neither answering system..

## 2023-04-14 ENCOUNTER — TELEPHONE (OUTPATIENT)
Dept: PAIN MEDICINE | Facility: CLINIC | Age: 78
End: 2023-04-14
Payer: MEDICARE

## 2023-04-14 NOTE — TELEPHONE ENCOUNTER
----- Message from Valeria Yanez sent at 4/14/2023  9:44 AM CDT -----  Regarding: Medication  Refill  Request                  Reply in [facility]: NO      Please refill the medication listed below. Please call the patient   [phone] (V)         Medication      HYDROcodone-acetaminophen (NORCO)  mg per tablet                          Dispense:  90 tablet                          Sig - Route: Take 1 tablet by mouth every 8 (eight) hours as needed for Pain. - Oral       Preferred Pharmacy:   [place] [address]   Phone: [phone]  Fax:  [phone]

## 2023-04-14 NOTE — TELEPHONE ENCOUNTER
Staff tried to reach pt voicemail not set up that medication has been sent to [facility] pharmacy on file in the chart but if patients wants to use [facility] to call and inform the staff.

## 2023-04-18 ENCOUNTER — PATIENT MESSAGE (OUTPATIENT)
Dept: PHARMACY | Facility: CLINIC | Age: 78
End: 2023-04-18
Payer: MEDICARE

## 2023-04-19 RX ADMIN — CAPSAICIN 4 PATCH: KIT at 09:04

## 2023-04-21 ENCOUNTER — SPECIALTY PHARMACY (OUTPATIENT)
Dept: PHARMACY | Facility: CLINIC | Age: 78
End: 2023-04-21
Payer: MEDICARE

## 2023-04-21 DIAGNOSIS — K31.84 GASTROPARESIS: ICD-10-CM

## 2023-04-21 RX ORDER — METOCLOPRAMIDE 5 MG/1
5 TABLET ORAL 4 TIMES DAILY PRN
Qty: 30 TABLET | Refills: 3 | Status: SHIPPED | OUTPATIENT
Start: 2023-04-21 | End: 2023-07-07 | Stop reason: SDUPTHER

## 2023-04-21 NOTE — TELEPHONE ENCOUNTER
No new care gaps identified.  [facility] Embedded Care Gaps. Reference number: [id number]. 4/21/2023   7:33:40 AM CDT

## 2023-04-21 NOTE — TELEPHONE ENCOUNTER
Specialty Pharmacy - Refill Coordination    Specialty Medication Orders Linked to Encounter      Flowsheet Row Most Recent Value   Medication #1 teriparatide 20 mcg/dose (620mcg/2.48mL) PnIj (Order#[id number], Rx#[id number])            Refill Questions - Documented Responses      Flowsheet Row Most Recent Value   Patient Availability and HIPAA Verification    Does patient want to proceed with activity? Yes   HIPAA/medical authority confirmed? Yes   Relationship to patient of person spoken to? Self   Refill Screening Questions    Changes to allergies? No   Changes to medications? No   New conditions since last clinic visit? No   Unplanned office visit, urgent care, ED, or hospital admission in the last 4 weeks? No   How does patient/caregiver feel medication is working? Good   Financial problems or insurance changes? No   How many doses of your specialty medications were missed in the last 4 weeks? 0   Would patient like to speak to a pharmacist? No   When does the patient need to receive the medication? 04/26/23   Refill Delivery Questions    How will the patient receive the medication? MEDRx   When does the patient need to receive the medication? 04/26/23   Shipping Address Home   Address in [facility] confirmed and updated if neccessary? Yes   Expected Copay ($) 0   Is the patient able to afford the medication copay? Yes   Payment Method zero copay   Days supply of Refill 28   Supplies needed? No supplies needed   Refill activity completed? Yes   Refill activity plan Refill scheduled   Shipment/Pickup Date: 04/24/23            Current Outpatient Medications   Medication Sig    acetaminophen (TYLENOL) 500 MG tablet Take 2 tablets (1,000 mg total) by mouth every 8 (eight) hours as needed for Pain.    allopurinoL (ZYLOPRIM) 300 MG tablet Take 1 tablet (300 mg total) by mouth once daily.    aspirin (ECOTRIN) 81 MG EC tablet Take 1 tablet (81 mg total) by mouth once daily.    atorvastatin (LIPITOR) 40 MG tablet  atorvastatin 40 mg tablet    blood sugar diagnostic Strp 1 each by Misc.(Non-Drug; Combo Route) route 4 (four) times daily.    blood-glucose meter Misc Follow package directions (Patient taking differently: Follow package directions)    blood-glucose meter,continuous (DEXCOM G6 ) Misc 1 each by Misc.(Non-Drug; Combo Route) route continuous prn.    blood-glucose sensor (DEXCOM G6 SENSOR) Nicole 3 each by Misc.(Non-Drug; Combo Route) route continuous prn. Change every 10 days.    blood-glucose transmitter (DEXCOM G6 TRANSMITTER) Nicole 1 each by Misc.(Non-Drug; Combo Route) route continuous prn.    cloNIDine (CATAPRES) 0.1 MG tablet Take 1 tablet (0.1 mg total) by mouth 2 (two) times daily.    evolocumab (REPATHA SURECLICK) 140 mg/mL PnIj Inject 1 mL (140 mg total) into the skin every 14 (fourteen) days.    fenofibrate micronized (LOFIBRA) 134 MG Cap Take 1 capsule (134 mg total) by mouth daily with breakfast.    furosemide (LASIX) 40 MG tablet Take 1 tablet (40 mg total) by mouth once daily.    HYDROcodone-acetaminophen (NORCO)  mg per tablet Take 1 tablet by mouth every 8 (eight) hours as needed for Pain.    hydrocortisone (ANUSOL-HC) 2.5 % rectal cream Procto-Med HC 2.5 % topical cream perineal applicator    insulin aspart U-100 (NOVOLOG) 100 unit/mL (3 mL) InPn pen Inject 12 Units into the skin 3 (three) times daily with meals. Plus correction scale. Max TDD 40units.    insulin detemir U-100, Levemir, (LEVEMIR FLEXPEN) 100 unit/mL (3 mL) InPn pen Inject 20 units into the skin 2 (two) times daily.    insulin detemir U-100, Levemir, (LEVEMIR FLEXTOUCH U-100 INSULN) 100 unit/mL (3 mL) InPn pen Inject 20 Units into the skin 2 (two) times daily.    ipratropium-albuteroL (COMBIVENT)  mcg/actuation inhaler Inhale 1 puff into the lungs by mouth every 6 (six) hours.    losartan (COZAAR) 50 MG tablet Take 1 tablet (50 mg total) by mouth once daily.    methocarbamoL (ROBAXIN) 500 MG Tab Take 1 tablet (500 mg  "total) by mouth 3 (three) times daily as needed (muscle spasm).    metoclopramide HCl (REGLAN) 5 MG tablet Take 1 tablet (5 mg total) by mouth 4 (four) times daily as needed (nausea, prevention).    metoprolol succinate (TOPROL-XL) 100 MG 24 hr tablet Take 1 tablet (100 mg total) by mouth once daily.    NIFEdipine (PROCARDIA-XL) 30 MG (OSM) 24 hr tablet Take 1 tablet (30 mg total) by mouth 2 (two) times a day.    nitroGLYCERIN (NITROSTAT) 0.4 MG SL tablet Place 1 tablet (0.4 mg total) under the tongue every 5 (five) minutes as needed for Chest pain.    pantoprazole (PROTONIX) 40 MG tablet Take 1 tablet (40 mg total) by mouth once daily.    pen needle, diabetic (BD ULTRA-FINE HIEN PEN NEEDLE) 32 gauge x 5/32" Ndle 1 each by Misc.(Non-Drug; Combo Route) route 4 (four) times daily.    pregabalin (LYRICA) 150 MG capsule Take 1 capsule (150 mg total) by mouth 3 (three) times daily.    senna-docusate 8.6-50 mg (PERICOLACE) 8.6-50 mg per tablet Take 1 tablet by mouth 2 (two) times daily as needed for Constipation.    sertraline (ZOLOFT) 100 MG tablet Take 1 tablet (100 mg total) by mouth once daily.    sulfamethoxazole-trimethoprim 800-160mg (BACTRIM DS) 800-160 mg Tab sulfamethoxazole 800 mg-trimethoprim 160 mg tablet    teriparatide 20 mcg/dose (620mcg/2.48mL) PnIj Inject 20 mcg into the skin once daily.    triamcinolone acetonide 0.1% (KENALOG) 0.1 % cream Apply to affected areas of body twice daily as needed rash. Do not use on face, underarms, or groin.    TRUEPLUS LANCETS 30 gauge Misc TEST AS DIRECTED FOUR TIMES DAILY   Last reviewed on 4/19/2023  9:16 AM by Colin Gibbs NP    Review of patient's allergies indicates:   Allergen Reactions    Bleach (sodium hypochlorite) Shortness Of Breath    Nitrofurantoin macrocrystalline Anaphylaxis    Lipitor [atorvastatin] Diarrhea and Rash    Nsaids (non-steroidal anti-inflammatory drug)     Pcn [penicillins]     Toradol [ketorolac]     Last reviewed on  4/21/2023 7:32 AM " by Yamile Gamez      Tasks added this encounter   5/19/2023 - Refill Call (Auto Added)   Tasks due within next 3 months   5/1/2023 - Refill Call (Auto Added)  7/10/2023 - Clinical - Follow Up Assesement (Annual)     [facility]  [address]  [facility] [address]  Phone: [phone]  Fax: [phone]

## 2023-04-21 NOTE — TELEPHONE ENCOUNTER
----- Message from Narcisa Santos sent at 4/20/2023  4:14 PM CDT -----  Regarding: refill  Who Called: Esha / daughter      Refill or New Rx: Refill        RX Name and Strength  metoclopramide HCl (REGLAN) 5 MG tablet    Is this a 30 day or 90 day RX:      Preferred Pharmacy with phone number:    [place] [address]   Phone:  [phone]  Fax:  [phone]          Local or Mail Order:local       Would the patient rather a call back or a response via [facility]?      best Call Back Number:      Additional Information:

## 2023-05-01 DIAGNOSIS — I20.89 ANGINA AT REST: ICD-10-CM

## 2023-05-01 RX ORDER — NITROGLYCERIN 0.4 MG/1
TABLET SUBLINGUAL
Qty: 25 TABLET | Refills: 0 | Status: SHIPPED | OUTPATIENT
Start: 2023-05-01 | End: 2023-08-09 | Stop reason: SDUPTHER

## 2023-05-01 NOTE — TELEPHONE ENCOUNTER
Refill Encounter    PCP Visits: Recent Visits  Date Type Provider Dept   11/01/22 Office Visit Chun Zapata MD [facility]   09/30/22 Office Visit Chun Zapata MD [facility]   06/20/22 Office Visit Chun Zapata MD [facility]   Showing recent visits within past 360 days and meeting all other requirements  Future Appointments  Date Type Provider Dept   06/09/23 Appointment Chun Zapata MD [facility]   Showing future appointments within next 720 days and meeting all other requirements     Last 3 Blood Pressure:   BP Readings from Last 3 Encounters:   03/16/23 134/72   03/08/23 (!) 164/72   02/10/23 (!) 176/85     Preferred Pharmacy:   [facility]  [address]  [facility] [address]  Phone: [phone] Fax: [phone]    [facility] [id number] [place] [address] [address]  [address] [place] [address]  Phone: [phone] Fax: [phone]    [facility] [address]  [address]  [facility] [address]  Phone: [phone] Fax: [phone]    Requested RX:  Requested Prescriptions     Pending Prescriptions Disp Refills    nitroGLYCERIN (NITROSTAT) 0.4 MG SL tablet [Pharmacy Med Name: nitroglycerin 0.4 mg sublingual tablet] 25 tablet 1     Sig: place 1 TABLET UNDER THE TONGUE every five minutes AS NEEDED FOR CHEST pain      RX Route: Normal

## 2023-05-01 NOTE — TELEPHONE ENCOUNTER
No care due was identified.  [facility] Embedded Care Due Messages. Reference number: [id number].   5/01/2023 9:51:11 AM CDT   DISPLAY PLAN FREE TEXT DISPLAY PLAN FREE TEXT DISPLAY PLAN FREE TEXT DISPLAY PLAN FREE TEXT

## 2023-05-03 DIAGNOSIS — Z71.89 COMPLEX CARE COORDINATION: ICD-10-CM

## 2023-05-09 ENCOUNTER — TELEPHONE (OUTPATIENT)
Dept: ADMINISTRATIVE | Facility: CLINIC | Age: 78
End: 2023-05-09
Payer: MEDICARE

## 2023-05-09 ENCOUNTER — SPECIALTY PHARMACY (OUTPATIENT)
Dept: PHARMACY | Facility: CLINIC | Age: 78
End: 2023-05-09
Payer: MEDICARE

## 2023-05-09 NOTE — TELEPHONE ENCOUNTER
Specialty Pharmacy - Refill Coordination    Specialty Medication Orders Linked to Encounter      Flowsheet Row Most Recent Value   Medication #1 evolocumab (REPATHA SURECLICK) 140 mg/mL PnIj (Order#[id number], Rx#[id number])            Refill Questions - Documented Responses      Flowsheet Row Most Recent Value   Patient Availability and HIPAA Verification    Does patient want to proceed with activity? Yes   HIPAA/medical authority confirmed? Yes   Relationship to patient of person spoken to? Child   Refill Screening Questions    Would patient like to speak to a pharmacist? No   When does the patient need to receive the medication? 05/10/23   Refill Delivery Questions    How will the patient receive the medication? MEDRx   When does the patient need to receive the medication? 05/10/23   Shipping Address Home   Address in [facility] confirmed and updated if neccessary? Yes   Expected Copay ($) 0   Is the patient able to afford the medication copay? Yes   Payment Method zero copay   Days supply of Refill 28   Supplies needed? No supplies needed   Refill activity completed? Yes   Refill activity plan Refill scheduled   Shipment/Pickup Date: 05/09/23            Current Outpatient Medications   Medication Sig    acetaminophen (TYLENOL) 500 MG tablet Take 2 tablets (1,000 mg total) by mouth every 8 (eight) hours as needed for Pain.    allopurinoL (ZYLOPRIM) 300 MG tablet Take 1 tablet (300 mg total) by mouth once daily.    aspirin (ECOTRIN) 81 MG EC tablet Take 1 tablet (81 mg total) by mouth once daily.    atorvastatin (LIPITOR) 40 MG tablet atorvastatin 40 mg tablet    blood sugar diagnostic Strp 1 each by Misc.(Non-Drug; Combo Route) route 4 (four) times daily.    blood-glucose meter Misc Follow package directions (Patient taking differently: Follow package directions)    blood-glucose meter,continuous (DEXCOM G6 ) Misc 1 each by Misc.(Non-Drug; Combo Route) route continuous prn.    blood-glucose sensor  (DEXCOM G6 SENSOR) Nicole 3 each by Misc.(Non-Drug; Combo Route) route continuous prn. Change every 10 days.    blood-glucose transmitter (DEXCOM G6 TRANSMITTER) Nicole 1 each by Misc.(Non-Drug; Combo Route) route continuous prn.    cloNIDine (CATAPRES) 0.1 MG tablet Take 1 tablet (0.1 mg total) by mouth 2 (two) times daily.    evolocumab (REPATHA SURECLICK) 140 mg/mL PnIj Inject 1 mL (140 mg total) into the skin every 14 (fourteen) days.    fenofibrate micronized (LOFIBRA) 134 MG Cap Take 1 capsule (134 mg total) by mouth daily with breakfast.    furosemide (LASIX) 40 MG tablet Take 1 tablet (40 mg total) by mouth once daily.    HYDROcodone-acetaminophen (NORCO)  mg per tablet Take 1 tablet by mouth every 8 (eight) hours as needed for Pain.    hydrocortisone (ANUSOL-HC) 2.5 % rectal cream Procto-Med HC 2.5 % topical cream perineal applicator    insulin aspart U-100 (NOVOLOG) 100 unit/mL (3 mL) InPn pen Inject 12 Units into the skin 3 (three) times daily with meals. Plus correction scale. Max TDD 40units.    insulin detemir U-100, Levemir, (LEVEMIR FLEXPEN) 100 unit/mL (3 mL) InPn pen INJECT 20 UNITS into THE SKIN TWICE DAILY    insulin detemir U-100, Levemir, (LEVEMIR FLEXTOUCH U-100 INSULN) 100 unit/mL (3 mL) InPn pen Inject 20 Units into the skin 2 (two) times daily.    ipratropium-albuteroL (COMBIVENT)  mcg/actuation inhaler Inhale 1 puff into the lungs by mouth every 6 (six) hours.    losartan (COZAAR) 50 MG tablet Take 1 tablet (50 mg total) by mouth once daily.    metoclopramide HCl (REGLAN) 5 MG tablet Take 1 tablet (5 mg total) by mouth 4 (four) times daily as needed (nausea, prevention).    metoprolol succinate (TOPROL-XL) 100 MG 24 hr tablet Take 1 tablet (100 mg total) by mouth once daily.    NIFEdipine (PROCARDIA-XL) 30 MG (OSM) 24 hr tablet Take 1 tablet (30 mg total) by mouth 2 (two) times a day.    nitroGLYCERIN (NITROSTAT) 0.4 MG SL tablet place 1 TABLET UNDER THE TONGUE every five minutes  "AS NEEDED FOR CHEST pain    pantoprazole (PROTONIX) 40 MG tablet Take 1 tablet (40 mg total) by mouth once daily.    pen needle, diabetic (BD ULTRA-FINE HIEN PEN NEEDLE) 32 gauge x 5/32" Ndle 1 each by Misc.(Non-Drug; Combo Route) route 4 (four) times daily.    pregabalin (LYRICA) 150 MG capsule Take 1 capsule (150 mg total) by mouth 3 (three) times daily.    senna-docusate 8.6-50 mg (PERICOLACE) 8.6-50 mg per tablet Take 1 tablet by mouth 2 (two) times daily as needed for Constipation.    sertraline (ZOLOFT) 100 MG tablet Take 1 tablet (100 mg total) by mouth once daily.    sulfamethoxazole-trimethoprim 800-160mg (BACTRIM DS) 800-160 mg Tab sulfamethoxazole 800 mg-trimethoprim 160 mg tablet    teriparatide 20 mcg/dose (620mcg/2.48mL) PnIj Inject 20 mcg into the skin once daily.    triamcinolone acetonide 0.1% (KENALOG) 0.1 % cream Apply to affected areas of body twice daily as needed rash. Do not use on face, underarms, or groin.    TRUEPLUS LANCETS 30 gauge Misc TEST AS DIRECTED FOUR TIMES DAILY   Last reviewed on 4/19/2023  9:16 AM by Colin Gibbs NP    Review of patient's allergies indicates:   Allergen Reactions    Bleach (sodium hypochlorite) Shortness Of Breath    Nitrofurantoin macrocrystalline Anaphylaxis    Lipitor [atorvastatin] Diarrhea and Rash    Nsaids (non-steroidal anti-inflammatory drug)     Pcn [penicillins]     Toradol [ketorolac]     Last reviewed on  5/1/2023 2:46 PM by Rere Winkler      Tasks added this encounter   No tasks added.   Tasks due within next 3 months   5/12/2023 - Refill Coordination Outreach (1 time occurrence)     Vineet Bledsoe, PharmD  [facility] - [facility]  [address] [place] [id number]  Phone: [phone]  Fax: [phone]        "

## 2023-05-11 ENCOUNTER — OFFICE VISIT (OUTPATIENT)
Dept: FAMILY MEDICINE | Facility: CLINIC | Age: 78
End: 2023-05-11
Payer: MEDICARE

## 2023-05-11 ENCOUNTER — TELEPHONE (OUTPATIENT)
Dept: PAIN MEDICINE | Facility: CLINIC | Age: 78
End: 2023-05-11
Payer: MEDICARE

## 2023-05-11 VITALS
TEMPERATURE: 98 F | DIASTOLIC BLOOD PRESSURE: 70 MMHG | WEIGHT: 197.75 LBS | HEART RATE: 81 BPM | SYSTOLIC BLOOD PRESSURE: 132 MMHG | OXYGEN SATURATION: 95 % | HEIGHT: 61 IN | BODY MASS INDEX: 37.34 KG/M2

## 2023-05-11 DIAGNOSIS — Z85.41 HISTORY OF CERVICAL CANCER: ICD-10-CM

## 2023-05-11 DIAGNOSIS — E78.01 FAMILIAL HYPERCHOLESTEROLEMIA: Chronic | ICD-10-CM

## 2023-05-11 DIAGNOSIS — J44.9 CAFL (CHRONIC AIRFLOW LIMITATION): ICD-10-CM

## 2023-05-11 DIAGNOSIS — M06.9 RHEUMATOID ARTHRITIS, INVOLVING UNSPECIFIED SITE, UNSPECIFIED WHETHER RHEUMATOID FACTOR PRESENT: ICD-10-CM

## 2023-05-11 DIAGNOSIS — I10 ESSENTIAL HYPERTENSION: Chronic | ICD-10-CM

## 2023-05-11 DIAGNOSIS — I25.118 CORONARY ARTERY DISEASE OF NATIVE ARTERY OF NATIVE HEART WITH STABLE ANGINA PECTORIS: ICD-10-CM

## 2023-05-11 DIAGNOSIS — I70.0 AORTIC ATHEROSCLEROSIS: ICD-10-CM

## 2023-05-11 DIAGNOSIS — M79.7 FIBROMYALGIA: Chronic | ICD-10-CM

## 2023-05-11 DIAGNOSIS — Z79.4 TYPE 2 DIABETES MELLITUS WITH HYPERGLYCEMIA, WITH LONG-TERM CURRENT USE OF INSULIN: Chronic | ICD-10-CM

## 2023-05-11 DIAGNOSIS — E66.01 CLASS 2 SEVERE OBESITY DUE TO EXCESS CALORIES WITH SERIOUS COMORBIDITY AND BODY MASS INDEX (BMI) OF 37.0 TO 37.9 IN ADULT: ICD-10-CM

## 2023-05-11 DIAGNOSIS — J41.1 MUCOPURULENT CHRONIC BRONCHITIS: ICD-10-CM

## 2023-05-11 DIAGNOSIS — K21.9 GASTROESOPHAGEAL REFLUX DISEASE WITHOUT ESOPHAGITIS: Chronic | ICD-10-CM

## 2023-05-11 DIAGNOSIS — E11.65 TYPE 2 DIABETES MELLITUS WITH HYPERGLYCEMIA, WITH LONG-TERM CURRENT USE OF INSULIN: Chronic | ICD-10-CM

## 2023-05-11 DIAGNOSIS — N18.32 STAGE 3B CHRONIC KIDNEY DISEASE: ICD-10-CM

## 2023-05-11 DIAGNOSIS — E11.42 DIABETIC POLYNEUROPATHY ASSOCIATED WITH TYPE 2 DIABETES MELLITUS: Chronic | ICD-10-CM

## 2023-05-11 DIAGNOSIS — F33.40 RECURRENT MAJOR DEPRESSIVE DISORDER, IN REMISSION: ICD-10-CM

## 2023-05-11 DIAGNOSIS — Z00.00 ENCOUNTER FOR PREVENTIVE HEALTH EXAMINATION: Primary | ICD-10-CM

## 2023-05-11 PROCEDURE — 99999 PR PBB SHADOW E&M-EST. PATIENT-LVL V: ICD-10-PCS | Mod: PBBFAC,,, | Performed by: NURSE PRACTITIONER

## 2023-05-11 PROCEDURE — G0439 PPPS, SUBSEQ VISIT: HCPCS | Mod: ,,, | Performed by: NURSE PRACTITIONER

## 2023-05-11 PROCEDURE — 99999 PR PBB SHADOW E&M-EST. PATIENT-LVL V: CPT | Mod: PBBFAC,,, | Performed by: NURSE PRACTITIONER

## 2023-05-11 PROCEDURE — G0439 PR MEDICARE ANNUAL WELLNESS SUBSEQUENT VISIT: ICD-10-PCS | Mod: ,,, | Performed by: NURSE PRACTITIONER

## 2023-05-11 PROCEDURE — 99215 OFFICE O/P EST HI 40 MIN: CPT | Mod: PBBFAC,PO | Performed by: NURSE PRACTITIONER

## 2023-05-11 NOTE — PROGRESS NOTES
"  Indira Waters presented for a  Medicare AWV and comprehensive Health Risk Assessment today. The following components were reviewed and updated:    Medical history  Family History  Social history  Allergies and Current Medications  Health Risk Assessment  Health Maintenance  Care Team       ** See Completed Assessments for Annual Wellness Visit within the encounter summary.**       The following assessments were completed:  Living Situation  CAGE  Depression Screening  Timed Get Up and Go  Whisper Test  Cognitive Function Screening  Nutrition Screening  ADL Screening  PAQ Screening            Vitals:    05/11/23 1455   BP: 132/70   BP Location: Right arm   Patient Position: Sitting   BP Method: Large (Manual)   Pulse: 81   Temp: 98.3 °F (36.8 °C)   TempSrc: Oral   SpO2: 95%   Weight: 89.7 kg (197 lb 12 oz)   Height: 5' 1" (1.549 m)     Body mass index is 37.37 kg/m².  Physical Exam  Vitals and nursing note reviewed.   Constitutional:       Appearance: Normal appearance. She is obese.   Cardiovascular:      Rate and Rhythm: Normal rate.      Pulses: Normal pulses.      Heart sounds: Normal heart sounds.   Pulmonary:      Effort: Pulmonary effort is normal.      Breath sounds: Normal breath sounds.   Musculoskeletal:      Comments: ambulates with a cane   Neurological:      Mental Status: She is alert and oriented to person, place, and time.   Psychiatric:         Mood and Affect: Mood normal.         Behavior: Behavior normal.           Diagnoses and health risks identified today and associated recommendations/orders:    1. Encounter for preventive health examination  Pt, accompanied by her daughter Esha, was seen today for an Annual Wellness visit. Healthcare maintenance and screening recommendations were discussed and updated as indicated. Return in one year for AWV.    Review current opioid prescriptions:yes  Screen for potential Substance Use Disorders:yes  Patient is aware of non-narcotic pain options  Followed " by prescribing provider    2. Aortic atherosclerosis  The current medical regimen is effective;  continue present plan and medications.    3. 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 pectoris  The current medical regimen is effective;  continue present plan and medications.    4. Rheumatoid arthritis, involving unspecified site, unspecified whether rheumatoid factor present  The current medical regimen is effective;  continue present plan and medications.    5. Mucopurulent chronic bronchitis  The current medical regimen is effective;  continue present plan and medications.    6. CAFL (chronic airflow limitation)  The current medical regimen is effective;  continue present plan and medications.    7. Type 2 diabetes mellitus with hyperglycemia, with long-term current use of insulin  8. Diabetic polyneuropathy associated with type 2 diabetes mellitus  The current medical regimen is effective;  continue present plan and medications.  Hemoglobin A1C   Date Value Ref Range Status   02/03/2023 9.9 (H) 4.0 - 5.6 % Final             12/05/2022 10.2 (H) 4.0 - 5.6 % Final             09/30/2022 11.2 (H) 4.0 - 5.6 % Final               9. Stage 3b chronic kidney disease  The current medical regimen is effective;  continue present plan and medications.    10. Recurrent major depressive disorder, in remission  The current medical regimen is effective;  continue present plan and medications.    11. Class 2 severe obesity due to excess calories with serious comorbidity and body mass index (BMI) of 37.0 to 37.9 in adult  The patient is asked to make an attempt to improve diet and exercise patterns to aid in medical management of this problem.    12. Essential hypertension  The current medical regimen is effective;  continue present plan and medications.    13. Familial hypercholesterolemia  The current medical regimen is effective;  continue present plan and medications.    14. Gastroesophageal  reflux disease without esophagitis  The current medical regimen is effective;  continue present plan and medications.    15. Fibromyalgia  The current medical regimen is effective;  continue present plan and medications.    16. History of cervical cancer  The current medical regimen is effective;  continue present plan and medications.        Provided Indira with a 5-10 year written screening schedule and personal prevention plan. Recommendations were developed using the USPSTF age appropriate recommendations. Education, counseling, and referrals were provided as needed. After Visit Summary printed and given to patient which includes a list of additional screenings\tests needed.    Follow up in about 1 year (around 5/11/2024).    THAD Roblero  I offered to discuss advanced care planning, including how to pick a person who would make decisions for you if you were unable to make them for yourself, called a health care power of , and what kind of decisions you might make such as use of life sustaining treatments such as ventilators and tube feeding when faced with a life limiting illness recorded on a living will that they will need to know. (How you want to be cared for as you near the end of your natural life)     X  Patient has advanced directives on file, which we reviewed, and they do not wish to make changes.

## 2023-05-11 NOTE — TELEPHONE ENCOUNTER
----- Message from Aziza Joes sent at 5/11/2023  1:15 PM CDT -----  Regarding: Refill med  Contact: Pt  Pt calling in regards to medication    Needs refill: HYDROcodone-acetaminophen (NORCO)  mg per tablet    Pharmacy: Preferred : [facility] [address] [facility] [address]   [facility] [address], Phone [phone]    Please call to advise    Pt phone [phone]

## 2023-05-11 NOTE — PATIENT INSTRUCTIONS
Counseling and Referral of Other Preventative  (Italic type indicates deductible and co-insurance are waived)    Patient Name: Indira Waters  Today's Date: 5/11/2023    Health Maintenance       Date Due Completion Date    Shingles Vaccine (1 of 2) 05/23/2016 3/28/2016    Pneumococcal Vaccines (Age 65+) (3 - PCV) 10/19/2018 10/19/2017    Hemoglobin A1c 05/03/2023 2/3/2023    TETANUS VACCINE 09/30/2023 (Originally 3/10/1963) ---    COVID-19 Vaccine (1) 09/30/2023 (Originally 1945) ---    Influenza Vaccine (Season Ended) 09/01/2023 11/5/2019    Diabetes Urine Screening 09/30/2023 9/30/2022    Lipid Panel 09/30/2023 9/30/2022    Eye Exam 01/10/2024 1/10/2023    Aspirin/Antiplatelet Therapy 03/17/2024 3/17/2023    DEXA Scan 07/14/2025 7/14/2022        No orders of the defined types were placed in this encounter.    The following information is provided to all patients.  This information is to help you find resources for any of the problems found today that may be affecting your health:                Living healthy guide: www.[facility].[place].gov      Understanding Diabetes: www.diabetes.org      Eating healthy: www.cdc.gov/healthyweight      CDC home safety checklist: www.cdc.gov/steadi/patient.html      Agency on Aging: www.goea.[place].[facility]      Alcoholics anonymous (AA): www.aa.org      Physical Activity: www.salma.nih.gov/vf2rktv      Tobacco use: www.quitwithusla.org

## 2023-05-15 ENCOUNTER — PATIENT MESSAGE (OUTPATIENT)
Dept: PHARMACY | Facility: CLINIC | Age: 78
End: 2023-05-15
Payer: MEDICARE

## 2023-05-15 DIAGNOSIS — G89.4 CHRONIC PAIN DISORDER: ICD-10-CM

## 2023-05-15 DIAGNOSIS — G89.4 CHRONIC PAIN SYNDROME: ICD-10-CM

## 2023-05-15 DIAGNOSIS — E08.42 DIABETIC POLYNEUROPATHY ASSOCIATED WITH DIABETES MELLITUS DUE TO UNDERLYING CONDITION: ICD-10-CM

## 2023-05-15 RX ORDER — HYDROCODONE BITARTRATE AND ACETAMINOPHEN 10; 325 MG/1; MG/1
1 TABLET ORAL EVERY 8 HOURS PRN
Qty: 90 TABLET | Refills: 0 | OUTPATIENT
Start: 2023-05-15 | End: 2023-06-14

## 2023-05-15 NOTE — TELEPHONE ENCOUNTER
----- Message from Estelle Lane sent at 5/15/2023 10:53 AM CDT -----  Regarding: Refill request  Type:  RX Refill Request    Who Called: CAYLA GOODEN [[id number]]    Refill or New Rx: Refill     RX Name and Strength: HYDROcodone-acetaminophen (NORCO)  mg per tablet    How is the patient currently taking it? (ex. 1XDay) 3xday     Is this a 30 day or 90 day RX: 30 days     Preferred Pharmacy with phone number: [facility]    Local or Mail Order: local     Ordering Provider: Randall Linares Call Back Number:    Additional Information:

## 2023-05-15 NOTE — TELEPHONE ENCOUNTER
Patient requesting refill on Hydrocodone 10/325  Last office visit 03/16/2023   shows last refill on 04/14/2023  Patient does have a pain contract on file with [facility]  Patient has NO uds on file.

## 2023-05-15 NOTE — TELEPHONE ENCOUNTER
----- Message from Roosevelt Pantoja sent at 5/15/2023  4:07 PM CDT -----  Can the clinic reply in [facility]: no              Please refill the medication(s) listed below. Please call the patient when the prescription(s) is ready for  at this phone number   [phone]           Medication #1 HYDROcodone-acetaminophen (NORCO)  mg per tablet         Medication #2           Preferred Pharmacy: [facility]    Telephone   [phone]      Fax  [phone]

## 2023-05-15 NOTE — TELEPHONE ENCOUNTER
Patient requesting refill on Hydrocodone   Last office visit 03/16/23   shows last refill on 04/14/23  Patient does have a pain contract on file with [facility]  Patient has NO uds on file.

## 2023-05-16 RX ORDER — HYDROCODONE BITARTRATE AND ACETAMINOPHEN 10; 325 MG/1; MG/1
1 TABLET ORAL EVERY 8 HOURS PRN
Qty: 90 TABLET | Refills: 0 | Status: SHIPPED | OUTPATIENT
Start: 2023-05-16 | End: 2023-06-16 | Stop reason: SDUPTHER

## 2023-05-16 NOTE — TELEPHONE ENCOUNTER
----- Message from Valeria Yanez sent at 5/16/2023 11:15 AM CDT -----  Regarding: Medication  Refill  Request                  Reply in [facility]: NO      Please refill the medication listed below. Please call the patient   [phone]        Medication      HYDROcodone-acetaminophen (NORCO)  mg per tablet                          Dispense: 90 Tablets                          Sig - Route: Take 1 tablet by mouth every 8 (eight) hours as needed for Pain. - Oral         Preferred Pharmacy:  [facility]   Phone: [phone]  Fax:  [phone]

## 2023-05-19 ENCOUNTER — OFFICE VISIT (OUTPATIENT)
Dept: CARDIOLOGY | Facility: CLINIC | Age: 78
End: 2023-05-19
Payer: MEDICARE

## 2023-05-19 ENCOUNTER — SPECIALTY PHARMACY (OUTPATIENT)
Dept: PHARMACY | Facility: CLINIC | Age: 78
End: 2023-05-19
Payer: MEDICARE

## 2023-05-19 ENCOUNTER — LAB VISIT (OUTPATIENT)
Dept: LAB | Facility: OTHER | Age: 78
End: 2023-05-19
Attending: INTERNAL MEDICINE
Payer: MEDICARE

## 2023-05-19 VITALS
HEART RATE: 81 BPM | OXYGEN SATURATION: 95 % | DIASTOLIC BLOOD PRESSURE: 70 MMHG | SYSTOLIC BLOOD PRESSURE: 132 MMHG | BODY MASS INDEX: 36.92 KG/M2 | WEIGHT: 195.38 LBS

## 2023-05-19 DIAGNOSIS — Z79.4 TYPE 2 DIABETES MELLITUS WITH HYPERGLYCEMIA, WITH LONG-TERM CURRENT USE OF INSULIN: Chronic | ICD-10-CM

## 2023-05-19 DIAGNOSIS — I70.0 AORTIC ATHEROSCLEROSIS: ICD-10-CM

## 2023-05-19 DIAGNOSIS — E78.2 MIXED HYPERLIPIDEMIA: ICD-10-CM

## 2023-05-19 DIAGNOSIS — E11.65 TYPE 2 DIABETES MELLITUS WITH HYPERGLYCEMIA, WITH LONG-TERM CURRENT USE OF INSULIN: Chronic | ICD-10-CM

## 2023-05-19 DIAGNOSIS — M81.0 AGE-RELATED OSTEOPOROSIS WITHOUT CURRENT PATHOLOGICAL FRACTURE: ICD-10-CM

## 2023-05-19 DIAGNOSIS — I10 PRIMARY HYPERTENSION: ICD-10-CM

## 2023-05-19 DIAGNOSIS — I25.118 ATHEROSCLEROSIS OF NATIVE CORONARY ARTERY OF NATIVE HEART WITH STABLE ANGINA PECTORIS: Primary | ICD-10-CM

## 2023-05-19 DIAGNOSIS — E66.01 SEVERE OBESITY: ICD-10-CM

## 2023-05-19 LAB
25(OH)D3+25(OH)D2 SERPL-MCNC: 26 NG/ML (ref 30–96)
ALBUMIN SERPL BCP-MCNC: 3.7 G/DL (ref 3.5–5.2)
ANION GAP SERPL CALC-SCNC: 13 MMOL/L (ref 8–16)
BUN SERPL-MCNC: 28 MG/DL (ref 8–23)
CALCIUM SERPL-MCNC: 9.7 MG/DL (ref 8.7–10.5)
CHLORIDE SERPL-SCNC: 97 MMOL/L (ref 95–110)
CO2 SERPL-SCNC: 29 MMOL/L (ref 23–29)
CREAT SERPL-MCNC: 1.6 MG/DL (ref 0.5–1.4)
EST. GFR  (NO RACE VARIABLE): 33 ML/MIN/1.73 M^2
ESTIMATED AVG GLUCOSE: 255 MG/DL (ref 68–131)
GLUCOSE SERPL-MCNC: 142 MG/DL (ref 70–110)
HBA1C MFR BLD: 10.5 % (ref 4–5.6)
PHOSPHATE SERPL-MCNC: 2.6 MG/DL (ref 2.7–4.5)
POTASSIUM SERPL-SCNC: 4 MMOL/L (ref 3.5–5.1)
SODIUM SERPL-SCNC: 139 MMOL/L (ref 136–145)

## 2023-05-19 PROCEDURE — 82306 VITAMIN D 25 HYDROXY: CPT | Performed by: INTERNAL MEDICINE

## 2023-05-19 PROCEDURE — 99999 PR PBB SHADOW E&M-EST. PATIENT-LVL V: CPT | Mod: PBBFAC,,, | Performed by: INTERNAL MEDICINE

## 2023-05-19 PROCEDURE — 82985 ASSAY OF GLYCATED PROTEIN: CPT | Performed by: INTERNAL MEDICINE

## 2023-05-19 PROCEDURE — 99214 PR OFFICE/OUTPT VISIT, EST, LEVL IV, 30-39 MIN: ICD-10-PCS | Mod: S$PBB,,, | Performed by: INTERNAL MEDICINE

## 2023-05-19 PROCEDURE — 99214 OFFICE O/P EST MOD 30 MIN: CPT | Mod: S$PBB,,, | Performed by: INTERNAL MEDICINE

## 2023-05-19 PROCEDURE — 99215 OFFICE O/P EST HI 40 MIN: CPT | Mod: PBBFAC | Performed by: INTERNAL MEDICINE

## 2023-05-19 PROCEDURE — 83036 HEMOGLOBIN GLYCOSYLATED A1C: CPT | Mod: 59 | Performed by: INTERNAL MEDICINE

## 2023-05-19 PROCEDURE — 36415 COLL VENOUS BLD VENIPUNCTURE: CPT | Performed by: INTERNAL MEDICINE

## 2023-05-19 PROCEDURE — 80069 RENAL FUNCTION PANEL: CPT | Performed by: INTERNAL MEDICINE

## 2023-05-19 PROCEDURE — 99999 PR PBB SHADOW E&M-EST. PATIENT-LVL V: ICD-10-PCS | Mod: PBBFAC,,, | Performed by: INTERNAL MEDICINE

## 2023-05-19 NOTE — TELEPHONE ENCOUNTER
Specialty Pharmacy - Refill Coordination    Specialty Medication Orders Linked to Encounter      Flowsheet Row Most Recent Value   Medication #1 teriparatide 20 mcg/dose (620mcg/2.48mL) PnIj (Order#[id number], Rx#[id number])            Refill Questions - Documented Responses      Flowsheet Row Most Recent Value   Patient Availability and HIPAA Verification    Does patient want to proceed with activity? Yes   HIPAA/medical authority confirmed? Yes   Relationship to patient of person spoken to? Child   Refill Screening Questions    Changes to allergies? No   Changes to medications? No   New conditions since last clinic visit? No   Unplanned office visit, urgent care, ED, or hospital admission in the last 4 weeks? No   How does patient/caregiver feel medication is working? Good   Financial problems or insurance changes? No   How many doses of your specialty medications were missed in the last 4 weeks? 0   Would patient like to speak to a pharmacist? No   When does the patient need to receive the medication? 05/26/23   Refill Delivery Questions    How will the patient receive the medication? MEDRx   When does the patient need to receive the medication? 05/26/23   Shipping Address Home   Address in [facility] confirmed and updated if neccessary? Yes   Expected Copay ($) 0   Is the patient able to afford the medication copay? Yes   Payment Method zero copay   Days supply of Refill 28   Supplies needed? No supplies needed   Refill activity completed? Yes   Refill activity plan Refill scheduled   Shipment/Pickup Date: 05/23/23            Current Outpatient Medications   Medication Sig    acetaminophen (TYLENOL) 500 MG tablet Take 2 tablets (1,000 mg total) by mouth every 8 (eight) hours as needed for Pain.    allopurinoL (ZYLOPRIM) 300 MG tablet Take 1 tablet (300 mg total) by mouth once daily.    aspirin (ECOTRIN) 81 MG EC tablet Take 1 tablet (81 mg total) by mouth once daily.    atorvastatin (LIPITOR) 40 MG tablet  atorvastatin 40 mg tablet    blood sugar diagnostic Strp 1 each by Misc.(Non-Drug; Combo Route) route 4 (four) times daily.    blood-glucose meter Misc Follow package directions (Patient taking differently: Follow package directions)    blood-glucose meter,continuous (DEXCOM G6 ) Misc 1 each by Misc.(Non-Drug; Combo Route) route continuous prn.    blood-glucose sensor (DEXCOM G6 SENSOR) Nicole 3 each by Misc.(Non-Drug; Combo Route) route continuous prn. Change every 10 days.    blood-glucose transmitter (DEXCOM G6 TRANSMITTER) Nicole 1 each by Misc.(Non-Drug; Combo Route) route continuous prn.    cloNIDine (CATAPRES) 0.1 MG tablet Take 1 tablet (0.1 mg total) by mouth 2 (two) times daily.    evolocumab (REPATHA SURECLICK) 140 mg/mL PnIj Inject 1 mL (140 mg total) into the skin every 14 (fourteen) days.    fenofibrate micronized (LOFIBRA) 134 MG Cap Take 1 capsule (134 mg total) by mouth daily with breakfast.    furosemide (LASIX) 40 MG tablet Take 1 tablet (40 mg total) by mouth once daily.    HYDROcodone-acetaminophen (NORCO)  mg per tablet Take 1 tablet by mouth every 8 (eight) hours as needed for Pain.    hydrocortisone (ANUSOL-HC) 2.5 % rectal cream Procto-Med HC 2.5 % topical cream perineal applicator    insulin aspart U-100 (NOVOLOG) 100 unit/mL (3 mL) InPn pen Inject 12 Units into the skin 3 (three) times daily with meals. Plus correction scale. Max TDD 40units.    insulin detemir U-100, Levemir, (LEVEMIR FLEXPEN) 100 unit/mL (3 mL) InPn pen INJECT 20 UNITS into THE SKIN TWICE DAILY    insulin detemir U-100, Levemir, (LEVEMIR FLEXTOUCH U-100 INSULN) 100 unit/mL (3 mL) InPn pen Inject 20 Units into the skin 2 (two) times daily.    ipratropium-albuteroL (COMBIVENT)  mcg/actuation inhaler Inhale 1 puff into the lungs by mouth every 6 (six) hours.    losartan (COZAAR) 50 MG tablet Take 1 tablet (50 mg total) by mouth once daily.    metoclopramide HCl (REGLAN) 5 MG tablet Take 1 tablet (5 mg total)  "by mouth 4 (four) times daily as needed (nausea, prevention).    metoprolol succinate (TOPROL-XL) 100 MG 24 hr tablet Take 1 tablet (100 mg total) by mouth once daily.    NIFEdipine (PROCARDIA-XL) 30 MG (OSM) 24 hr tablet Take 1 tablet (30 mg total) by mouth 2 (two) times a day.    nitroGLYCERIN (NITROSTAT) 0.4 MG SL tablet place 1 TABLET UNDER THE TONGUE every five minutes AS NEEDED FOR CHEST pain    pantoprazole (PROTONIX) 40 MG tablet Take 1 tablet (40 mg total) by mouth once daily.    pen needle, diabetic (BD ULTRA-FINE HIEN PEN NEEDLE) 32 gauge x 5/32" Ndle 1 each by Misc.(Non-Drug; Combo Route) route 4 (four) times daily.    pregabalin (LYRICA) 150 MG capsule Take 1 capsule (150 mg total) by mouth 3 (three) times daily.    senna-docusate 8.6-50 mg (PERICOLACE) 8.6-50 mg per tablet Take 1 tablet by mouth 2 (two) times daily as needed for Constipation.    sertraline (ZOLOFT) 100 MG tablet Take 1 tablet (100 mg total) by mouth once daily.    sulfamethoxazole-trimethoprim 800-160mg (BACTRIM DS) 800-160 mg Tab sulfamethoxazole 800 mg-trimethoprim 160 mg tablet    teriparatide 20 mcg/dose (620mcg/2.48mL) PnIj Inject 20 mcg into the skin once daily.    triamcinolone acetonide 0.1% (KENALOG) 0.1 % cream Apply to affected areas of body twice daily as needed rash. Do not use on face, underarms, or groin. (Patient not taking: Reported on 5/11/2023)    TRUEPLUS LANCETS 30 gauge Misc TEST AS DIRECTED FOUR TIMES DAILY   Last reviewed on 5/11/2023  3:28 PM by THAD Payan    Review of patient's allergies indicates:   Allergen Reactions    Bleach (sodium hypochlorite) Shortness Of Breath    Nitrofurantoin macrocrystalline Anaphylaxis    Lipitor [atorvastatin] Diarrhea and Rash    Nsaids (non-steroidal anti-inflammatory drug)     Pcn [penicillins]     Toradol [ketorolac]     Last reviewed on  5/11/2023 3:21 PM by Nida Block      Tasks added this encounter   No tasks added.   Tasks due within next 3 months   7/10/2023 " - Clinical Assessment (1 year recurrence)  5/18/2023 - Refill Coordination Outreach (1 time occurrence)     [facility]  [facility] [address]  [facility] [address]  Phone: [phone]  Fax: [phone]

## 2023-05-19 NOTE — PROGRESS NOTES
[facility]    Chief Complaint  Chief Complaint   Patient presents with    Coronary Artery Disease       HPI:    Generally doing well from a cardiac standpoint.  More problems with knee pain and back pain.  Describes her diabetes as out of control.  Occasional episodes of angina.  Mostly happen at night and wake her up from sleep with associated dyspnea.  Always resolved after a single sublingual nitroglycerin.  No exertional discomfort.    Medications  Current Outpatient Medications   Medication Sig Dispense Refill    acetaminophen (TYLENOL) 500 MG tablet Take 2 tablets (1,000 mg total) by mouth every 8 (eight) hours as needed for Pain.  0    allopurinoL (ZYLOPRIM) 300 MG tablet Take 1 tablet (300 mg total) by mouth once daily. 90 tablet 1    aspirin (ECOTRIN) 81 MG EC tablet Take 1 tablet (81 mg total) by mouth once daily. 30 tablet 0    blood sugar diagnostic Strp 1 each by Misc.(Non-Drug; Combo Route) route 4 (four) times daily. 200 each 0    blood-glucose meter Misc Follow package directions (Patient taking differently: Follow package directions) 1 each 0    blood-glucose meter,continuous (DEXCOM G6 ) Misc 1 each by Misc.(Non-Drug; Combo Route) route continuous prn. 1 each PRN    blood-glucose sensor (DEXCOM G6 SENSOR) Nicole 3 each by Misc.(Non-Drug; Combo Route) route continuous prn. Change every 10 days. 3 each PRN    blood-glucose transmitter (DEXCOM G6 TRANSMITTER) Nicole 1 each by Misc.(Non-Drug; Combo Route) route continuous prn. 1 each PRN    cloNIDine (CATAPRES) 0.1 MG tablet Take 1 tablet (0.1 mg total) by mouth 2 (two) times daily. 180 tablet 0    evolocumab (REPATHA SURECLICK) 140 mg/mL PnIj Inject 1 mL (140 mg total) into the skin every 14 (fourteen) days. 8 each 3    fenofibrate micronized (LOFIBRA) 134 MG Cap Take 1 capsule (134 mg total) by mouth daily with breakfast. 90 capsule 3    furosemide (LASIX) 40 MG tablet Take 1 tablet (40 mg total) by mouth once daily. 30 tablet 6  "   HYDROcodone-acetaminophen (NORCO)  mg per tablet Take 1 tablet by mouth every 8 (eight) hours as needed for Pain. 90 tablet 0    hydrocortisone (ANUSOL-HC) 2.5 % rectal cream Procto-Med HC 2.5 % topical cream perineal applicator      insulin aspart U-100 (NOVOLOG) 100 unit/mL (3 mL) InPn pen Inject 12 Units into the skin 3 (three) times daily with meals. Plus correction scale. Max TDD 40units. 15 mL 3    insulin detemir U-100, Levemir, (LEVEMIR FLEXPEN) 100 unit/mL (3 mL) InPn pen INJECT 20 UNITS into THE SKIN TWICE DAILY 15 mL 11    insulin detemir U-100, Levemir, (LEVEMIR FLEXTOUCH U-100 INSULN) 100 unit/mL (3 mL) InPn pen Inject 20 Units into the skin 2 (two) times daily. 15 mL 3    ipratropium-albuteroL (COMBIVENT)  mcg/actuation inhaler Inhale 1 puff into the lungs by mouth every 6 (six) hours. 4 g 0    losartan (COZAAR) 50 MG tablet Take 1 tablet (50 mg total) by mouth once daily. 90 tablet 0    metoclopramide HCl (REGLAN) 5 MG tablet Take 1 tablet (5 mg total) by mouth 4 (four) times daily as needed (nausea, prevention). 30 tablet 3    metoprolol succinate (TOPROL-XL) 100 MG 24 hr tablet Take 1 tablet (100 mg total) by mouth once daily. 90 tablet 3    NIFEdipine (PROCARDIA-XL) 30 MG (OSM) 24 hr tablet Take 1 tablet (30 mg total) by mouth 2 (two) times a day. 180 tablet 0    nitroGLYCERIN (NITROSTAT) 0.4 MG SL tablet place 1 TABLET UNDER THE TONGUE every five minutes AS NEEDED FOR CHEST pain 25 tablet 0    pantoprazole (PROTONIX) 40 MG tablet Take 1 tablet (40 mg total) by mouth once daily. 30 tablet 0    pen needle, diabetic (BD ULTRA-FINE HIEN PEN NEEDLE) 32 gauge x 5/32" Ndle 1 each by Misc.(Non-Drug; Combo Route) route 4 (four) times daily. 400 each 3    pregabalin (LYRICA) 150 MG capsule Take 1 capsule (150 mg total) by mouth 3 (three) times daily. 90 capsule 2    senna-docusate 8.6-50 mg (PERICOLACE) 8.6-50 mg per tablet Take 1 tablet by mouth 2 (two) times daily as needed for " Constipation. 60 tablet 0    sertraline (ZOLOFT) 100 MG tablet Take 1 tablet (100 mg total) by mouth once daily. 90 tablet 3    sulfamethoxazole-trimethoprim 800-160mg (BACTRIM DS) 800-160 mg Tab sulfamethoxazole 800 mg-trimethoprim 160 mg tablet      teriparatide 20 mcg/dose (620mcg/2.48mL) PnIj Inject 20 mcg into the skin once daily. 2.48 mL 11    triamcinolone acetonide 0.1% (KENALOG) 0.1 % cream Apply to affected areas of body twice daily as needed rash. Do not use on face, underarms, or groin. (Patient not taking: Reported on 2023) 454 g 0    TRUEPLUS LANCETS 30 gauge Misc TEST AS DIRECTED FOUR TIMES DAILY 200 each 2     No current facility-administered medications for this visit.        History  Past Medical History:   Diagnosis Date    Arthritis     Back pain     Cancer     ovarian    Cervical cancer     Depression     Diabetes mellitus     Fibromyalgia     Heart attack     Hyperlipidemia     Hypertension     Lupus     Stroke     slight left sided weakness     Past Surgical History:   Procedure Laterality Date    APPENDECTOMY       SECTION      2    CORONARY ANGIOGRAPHY N/A 2022    Procedure: ANGIOGRAM, CORONARY ARTERY;  Surgeon: Jose L Méndez MD;  Location: [facility] CATH LAB;  Service: Cardiology;  Laterality: N/A;    HYSTERECTOMY      with USO for cervical cancer    INJECTION OF ANESTHETIC AGENT AROUND NERVE Bilateral 2021    Procedure: BLOCK, NERVE, SYMPATHIC;  Surgeon: Holden Pereira MD;  Location: [facility] PAIN MGT;  Service: Pain Management;  Laterality: Bilateral;    INJECTION OF ANESTHETIC AGENT AROUND NERVE N/A 2021    Procedure: BLOCK, NERVE, SYMPATHETIC  need consent;  Surgeon: Holden Pereira MD;  Location: [facility] PAIN MGT;  Service: Pain Management;  Laterality: N/A;    DC EVAL,SWALLOW FUNCTION,CINE/VIDEO RECORD  2021         TONSILLECTOMY      TRIAL OF SPINAL CORD NERVE STIMULATOR N/A 3/8/2023    Procedure: LUMBAR SPINAL CORD STIMULATOR TRIAL DIEUDONNE REP PATIENT  STATES SHE NO LONGER TAKES PLAVIX;  Surgeon: Holden Pereira MD;  Location: [facility];  Service: Pain Management;  Laterality: N/A;     Social History     Socioeconomic History    Marital status:    Tobacco Use    Smoking status: Former     Types: Cigarettes     Quit date: 2020     Years since quittin.5     Passive exposure: Past    Smokeless tobacco: Never   Substance and Sexual Activity    Alcohol use: Not Currently     Comment: occasionally    Drug use: Yes     Types: Hydrocodone     Comment: three times a day    Sexual activity: Not Currently   Social History Narrative    Lives with daughter. .      Family History   Problem Relation Age of Onset    COPD Mother     Lupus Mother     Hernia Mother     Uterine cancer Mother         vs cervical cancer    Ovarian cancer Mother     Diabetes Father     Coronary artery disease Father     Colon cancer Maternal Grandmother         in her 50's        Allergies  Review of patient's allergies indicates:   Allergen Reactions    Bleach (sodium hypochlorite) Shortness Of Breath    Nitrofurantoin macrocrystalline Anaphylaxis    Lipitor [atorvastatin] Diarrhea and Rash    Nsaids (non-steroidal anti-inflammatory drug)     Pcn [penicillins]     Toradol [ketorolac]        Review of Systems   Review of Systems   Constitutional: Negative for chills, fever and malaise/fatigue.   HENT:  Negative for nosebleeds.    Eyes:  Negative for blurred vision, double vision, vision loss in left eye and vision loss in right eye.   Cardiovascular:  Positive for chest pain and paroxysmal nocturnal dyspnea. Negative for claudication, dyspnea on exertion, irregular heartbeat, leg swelling, near-syncope, orthopnea, palpitations and syncope.   Respiratory:  Negative for cough, hemoptysis, shortness of breath and wheezing.    Endocrine: Negative for cold intolerance and heat intolerance.   Hematologic/Lymphatic: Negative for bleeding problem. Does not bruise/bleed easily.    Skin:  Negative for color change.   Musculoskeletal:  Negative for falls, muscle weakness and myalgias.   Gastrointestinal:  Negative for abdominal pain, heartburn, hematemesis, hematochezia, hemorrhoids, jaundice, melena, nausea and vomiting.   Genitourinary:  Negative for dysuria, hematuria and nocturia.   Neurological:  Negative for dizziness, focal weakness, headaches, light-headedness, loss of balance, numbness, vertigo and weakness.   Psychiatric/Behavioral:  Negative for altered mental status, depression and memory loss. The patient is not nervous/anxious.    Allergic/Immunologic: Negative for hives and persistent infections.     Physical Exam  Vitals:    05/19/23 1459   BP: 132/70   Pulse: 81     Wt Readings from Last 1 Encounters:   05/19/23 88.6 kg (195 lb 6.4 oz)     Physical Exam  Vitals and nursing note reviewed.   Constitutional:       General: She is not in acute distress.     Appearance: She is obese. She is not toxic-appearing or diaphoretic.   HENT:      Head: Normocephalic and atraumatic.      Mouth/Throat:      Lips: Pink.      Mouth: Mucous membranes are moist.   Eyes:      General: No scleral icterus.     Conjunctiva/sclera: Conjunctivae normal.   Neck:      Thyroid: No thyromegaly.      Vascular: No carotid bruit, hepatojugular reflux or JVD.      Trachea: Trachea normal.   Cardiovascular:      Rate and Rhythm: Normal rate and regular rhythm. No extrasystoles are present.     Chest Wall: PMI is not displaced.      Pulses:           Carotid pulses are 2+ on the right side and 2+ on the left side.       Radial pulses are 2+ on the right side and 2+ on the left side.      Heart sounds: S1 normal and S2 normal. No murmur heard.    No friction rub. No S3 or S4 sounds.   Pulmonary:      Effort: Pulmonary effort is normal. No accessory muscle usage or respiratory distress.      Breath sounds: Normal breath sounds and air entry. No decreased breath sounds, wheezing, rhonchi or rales.   Abdominal:       General: Bowel sounds are normal. There is no distension or abdominal bruit.      Palpations: Abdomen is soft. There is no hepatomegaly, splenomegaly or pulsatile mass.      Tenderness: There is no abdominal tenderness.   Musculoskeletal:         General: No tenderness or deformity.      Right lower leg: No edema.      Left lower leg: No edema.   Skin:     General: Skin is warm and dry.      Capillary Refill: Capillary refill takes less than 2 seconds.      Coloration: Skin is not cyanotic or pale.      Nails: There is no clubbing.   Neurological:      General: No focal deficit present.      Mental Status: She is alert and oriented to person, place, and time.   Psychiatric:         Attention and Perception: Attention normal.         Mood and Affect: Mood normal.         Speech: Speech normal.         Behavior: Behavior normal. Behavior is cooperative.       Labs  Admission on 03/08/2023, Discharged on 03/08/2023   Component Date Value Ref Range Status    POCT Glucose 03/08/2023 375 (H)  70 - 110 mg/dL Final    POCT Glucose 03/08/2023 366 (H)  70 - 110 mg/dL Final    POCT Glucose 03/08/2023 346 (H)  70 - 110 mg/dL Final   Lab Visit on 02/03/2023   Component Date Value Ref Range Status    Sodium 02/03/2023 137  136 - 145 mmol/L Final    Potassium 02/03/2023 4.5  3.5 - 5.1 mmol/L Final    Chloride 02/03/2023 102  95 - 110 mmol/L Final    CO2 02/03/2023 26  23 - 29 mmol/L Final    Glucose 02/03/2023 371 (H)  70 - 110 mg/dL Final    BUN 02/03/2023 19  8 - 23 mg/dL Final    Creatinine 02/03/2023 1.5 (H)  0.5 - 1.4 mg/dL Final    Calcium 02/03/2023 9.6  8.7 - 10.5 mg/dL Final    Anion Gap 02/03/2023 9  8 - 16 mmol/L Final    eGFR 02/03/2023 36 (A)  >60 mL/min/1.73 m^2 Final    Hemoglobin A1C 02/03/2023 9.9 (H)  4.0 - 5.6 % Final    Comment: ADA Screening Guidelines:  5.7-6.4%  Consistent with prediabetes  >or=6.5%  Consistent with diabetes    High levels of fetal hemoglobin interfere with the HbA1C  assay. Heterozygous  hemoglobin variants (HbS, HgC, etc)do  not significantly interfere with this assay.   However, presence of multiple variants may affect accuracy.      Estimated Avg Glucose 02/03/2023 237 (H)  68 - 131 mg/dL Final   Office Visit on 01/26/2023   Component Date Value Ref Range Status    Urine Culture, Routine 01/26/2023 No growth   Final   Admission on 01/04/2023, Discharged on 01/05/2023   Component Date Value Ref Range Status    POCT Glucose 01/04/2023 256 (H)  70 - 110 mg/dL Final    WBC 01/05/2023 11.34  3.90 - 12.70 K/uL Final    RBC 01/05/2023 3.94 (L)  4.00 - 5.40 M/uL Final    Hemoglobin 01/05/2023 11.3 (L)  12.0 - 16.0 g/dL Final    Hematocrit 01/05/2023 33.7 (L)  37.0 - 48.5 % Final    MCV 01/05/2023 86  82 - 98 fL Final    MCH 01/05/2023 28.7  27.0 - 31.0 pg Final    MCHC 01/05/2023 33.5  32.0 - 36.0 g/dL Final    RDW 01/05/2023 14.9 (H)  11.5 - 14.5 % Final    Platelets 01/05/2023 272  150 - 450 K/uL Final    MPV 01/05/2023 11.6  9.2 - 12.9 fL Final    Immature Granulocytes 01/05/2023 0.4  0.0 - 0.5 % Final    Gran # (ANC) 01/05/2023 6.5  1.8 - 7.7 K/uL Final    Immature Grans (Abs) 01/05/2023 0.05 (H)  0.00 - 0.04 K/uL Final    Comment: Mild elevation in immature granulocytes is non specific and   can be seen in a variety of conditions including stress response,   acute inflammation, trauma and pregnancy. Correlation with other   laboratory and clinical findings is essential.      Lymph # 01/05/2023 3.3  1.0 - 4.8 K/uL Final    Mono # 01/05/2023 0.8  0.3 - 1.0 K/uL Final    Eos # 01/05/2023 0.6 (H)  0.0 - 0.5 K/uL Final    Baso # 01/05/2023 0.07  0.00 - 0.20 K/uL Final    nRBC 01/05/2023 0  0 /100 WBC Final    Gran % 01/05/2023 57.5  38.0 - 73.0 % Final    Lymph % 01/05/2023 29.1  18.0 - 48.0 % Final    Mono % 01/05/2023 7.1  4.0 - 15.0 % Final    Eosinophil % 01/05/2023 5.3  0.0 - 8.0 % Final    Basophil % 01/05/2023 0.6  0.0 - 1.9 % Final    Differential Method 01/05/2023 Automated   Final    Sodium  01/05/2023 138  136 - 145 mmol/L Final    Potassium 01/05/2023 4.4  3.5 - 5.1 mmol/L Final    Chloride 01/05/2023 103  95 - 110 mmol/L Final    CO2 01/05/2023 24  23 - 29 mmol/L Final    Glucose 01/05/2023 223 (H)  70 - 110 mg/dL Final    BUN 01/05/2023 18  8 - 23 mg/dL Final    Creatinine 01/05/2023 1.4  0.5 - 1.4 mg/dL Final    Calcium 01/05/2023 9.5  8.7 - 10.5 mg/dL Final    Anion Gap 01/05/2023 11  8 - 16 mmol/L Final    eGFR 01/05/2023 39 (A)  >60 mL/min/1.73 m^2 Final    POCT Glucose 01/05/2023 228 (H)  70 - 110 mg/dL Final   Lab Visit on 12/05/2022   Component Date Value Ref Range Status    Specimen UA 12/05/2022 Urine, Clean Catch   Final    Color, UA 12/05/2022 Yellow  Yellow, Straw, Aye Final    Appearance, UA 12/05/2022 Clear  Clear Final    pH, UA 12/05/2022 6.0  5.0 - 8.0 Final    Specific Gravity, UA 12/05/2022 1.010  1.005 - 1.030 Final    Protein, UA 12/05/2022 Negative  Negative Final    Comment: Recommend a 24 hour urine protein or a urine   protein/creatinine ratio if globulin induced proteinuria is  clinically suspected.      Glucose, UA 12/05/2022 3+ (A)  Negative Final    Ketones, UA 12/05/2022 Negative  Negative Final    Bilirubin (UA) 12/05/2022 Negative  Negative Final    Occult Blood UA 12/05/2022 Trace (A)  Negative Final    Nitrite, UA 12/05/2022 Negative  Negative Final    Urobilinogen, UA 12/05/2022 Negative  <2.0 EU/dL Final    Leukocytes, UA 12/05/2022 2+ (A)  Negative Final    Protein, Urine Random 12/05/2022 8  0 - 15 mg/dL Final    Creatinine, Urine 12/05/2022 32.4  15.0 - 325.0 mg/dL Final    Prot/Creat Ratio, Urine 12/05/2022 0.25 (H)  0.00 - 0.20 Final    RBC, UA 12/05/2022 2  0 - 4 /hpf Final    WBC, UA 12/05/2022 25 (H)  0 - 5 /hpf Final    WBC Clumps, UA 12/05/2022 Few (A)  None-Rare Final    Bacteria 12/05/2022 Many (A)  None-Occ /hpf Final    Yeast, UA 12/05/2022 None  None Final    Squam Epithel, UA 12/05/2022 3  /hpf Final    Hyaline Casts, UA 12/05/2022 5 (A)  0-1/lpf  /lpf Final    Microscopic Comment 12/05/2022 SEE COMMENT   Final    Comment: Other formed elements not mentioned in the report are not   present in the microscopic examination.      Lab Visit on 12/05/2022   Component Date Value Ref Range Status    Sodium 12/05/2022 137  136 - 145 mmol/L Final    Potassium 12/05/2022 4.6  3.5 - 5.1 mmol/L Final    Chloride 12/05/2022 103  95 - 110 mmol/L Final    CO2 12/05/2022 24  23 - 29 mmol/L Final    Glucose 12/05/2022 386 (H)  70 - 110 mg/dL Final    BUN 12/05/2022 21  8 - 23 mg/dL Final    Creatinine 12/05/2022 1.5 (H)  0.5 - 1.4 mg/dL Final    Calcium 12/05/2022 9.5  8.7 - 10.5 mg/dL Final    Total Protein 12/05/2022 7.0  6.0 - 8.4 g/dL Final    Albumin 12/05/2022 3.6  3.5 - 5.2 g/dL Final    Total Bilirubin 12/05/2022 0.2  0.1 - 1.0 mg/dL Final    Comment: For infants and newborns, interpretation of results should be based  on gestational age, weight and in agreement with clinical  observations.    Premature Infant recommended reference ranges:  Up to 24 hours.............<8.0 mg/dL  Up to 48 hours............<12.0 mg/dL  3-5 days..................<15.0 mg/dL  6-29 days.................<15.0 mg/dL      Alkaline Phosphatase 12/05/2022 69  55 - 135 U/L Final    AST 12/05/2022 23  10 - 40 U/L Final    ALT 12/05/2022 23  10 - 44 U/L Final    Anion Gap 12/05/2022 10  8 - 16 mmol/L Final    eGFR 12/05/2022 36 (A)  >60 mL/min/1.73 m^2 Final    WBC 12/05/2022 9.20  3.90 - 12.70 K/uL Final    RBC 12/05/2022 3.68 (L)  4.00 - 5.40 M/uL Final    Hemoglobin 12/05/2022 10.4 (L)  12.0 - 16.0 g/dL Final    Hematocrit 12/05/2022 32.3 (L)  37.0 - 48.5 % Final    MCV 12/05/2022 88  82 - 98 fL Final    MCH 12/05/2022 28.3  27.0 - 31.0 pg Final    MCHC 12/05/2022 32.2  32.0 - 36.0 g/dL Final    RDW 12/05/2022 15.3 (H)  11.5 - 14.5 % Final    Platelets 12/05/2022 292  150 - 450 K/uL Final    MPV 12/05/2022 12.1  9.2 - 12.9 fL Final    Immature Granulocytes 12/05/2022 0.5  0.0 - 0.5 % Final     Gran # (ANC) 12/05/2022 6.0  1.8 - 7.7 K/uL Final    Immature Grans (Abs) 12/05/2022 0.05 (H)  0.00 - 0.04 K/uL Final    Comment: Mild elevation in immature granulocytes is non specific and   can be seen in a variety of conditions including stress response,   acute inflammation, trauma and pregnancy. Correlation with other   laboratory and clinical findings is essential.      Lymph # 12/05/2022 2.1  1.0 - 4.8 K/uL Final    Mono # 12/05/2022 0.5  0.3 - 1.0 K/uL Final    Eos # 12/05/2022 0.5  0.0 - 0.5 K/uL Final    Baso # 12/05/2022 0.05  0.00 - 0.20 K/uL Final    nRBC 12/05/2022 0  0 /100 WBC Final    Gran % 12/05/2022 65.6  38.0 - 73.0 % Final    Lymph % 12/05/2022 22.7  18.0 - 48.0 % Final    Mono % 12/05/2022 5.7  4.0 - 15.0 % Final    Eosinophil % 12/05/2022 5.0  0.0 - 8.0 % Final    Basophil % 12/05/2022 0.5  0.0 - 1.9 % Final    Differential Method 12/05/2022 Automated   Final    PTH, Intact 12/05/2022 30.1  9.0 - 77.0 pg/mL Final    Phosphorus 12/05/2022 3.3  2.7 - 4.5 mg/dL Final    Hemoglobin A1C 12/05/2022 10.2 (H)  4.0 - 5.6 % Final    Comment: ADA Screening Guidelines:  5.7-6.4%  Consistent with prediabetes  >or=6.5%  Consistent with diabetes    High levels of fetal hemoglobin interfere with the HbA1C  assay. Heterozygous hemoglobin variants (HbS, HgC, etc)do  not significantly interfere with this assay.   However, presence of multiple variants may affect accuracy.      Estimated Avg Glucose 12/05/2022 246 (H)  68 - 131 mg/dL Final    Uric Acid 12/05/2022 3.5  2.4 - 5.7 mg/dL Final       Imaging  No results found.    Assessment  1. Atherosclerosis of native coronary artery of native heart with stable angina pectoris  Sounds like angina decubitus.  Asked her to see if it correlated with sodium excess in the days prior.  Reinforced importance of sodium restriction.    2. Primary hypertension  Controlled    3. Mixed hyperlipidemia  Controlled.  Due for repeat blood work soon.    4. Aortic  atherosclerosis  Continue risk factor modification efforts    5. Severe obesity  No improvement      Plan and Discussion    Continue current guideline directed medical therapy.    The 10-year ASCVD risk score (Annamarie DK, et al., 2019) is: 44.4%    Values used to calculate the score:      Age: 78 years      Sex: Female      Is Non- : No      Diabetic: Yes      Tobacco smoker: No      Systolic Blood Pressure: 132 mmHg      Is BP treated: Yes      HDL Cholesterol: 38 mg/dL      Total Cholesterol: 171 mg/dL     Follow Up  Follow up in about 6 months (around 11/19/2023).      Jose L Méndez MD

## 2023-05-22 LAB — FRUCTOSAMINE SERPL-SCNC: 480 UMOL /L

## 2023-05-24 ENCOUNTER — TELEPHONE (OUTPATIENT)
Dept: ENDOCRINOLOGY | Facility: CLINIC | Age: 78
End: 2023-05-24
Payer: MEDICARE

## 2023-05-24 NOTE — TELEPHONE ENCOUNTER
Spoke with patient, I stated to her there are no available appointments, as of now with Dr. Tanner, I stated to her to check her portal for any openings, patient verbalized understanding.

## 2023-05-24 NOTE — TELEPHONE ENCOUNTER
----- Message from Gertrudis Branham sent at 5/24/2023 10:00 AM CDT -----  Regarding: sooner appt  Contact: [phone]  Indira Waters daughter/Esha calling regarding Appointment Reschedule for #appt on 6.9. She would like to see him sooner and before he leaves. Please call

## 2023-05-30 ENCOUNTER — PATIENT MESSAGE (OUTPATIENT)
Dept: PHARMACY | Facility: CLINIC | Age: 78
End: 2023-05-30
Payer: MEDICARE

## 2023-05-31 DIAGNOSIS — G89.4 CHRONIC PAIN DISORDER: ICD-10-CM

## 2023-05-31 RX ORDER — PREGABALIN 150 MG/1
150 CAPSULE ORAL 3 TIMES DAILY
Qty: 90 CAPSULE | Refills: 2 | Status: SHIPPED | OUTPATIENT
Start: 2023-05-31 | End: 2023-08-24 | Stop reason: SDUPTHER

## 2023-05-31 NOTE — TELEPHONE ENCOUNTER
----- Message from Rosalba Damian sent at 5/31/2023  3:40 PM CDT -----  Regarding: Refill Request  Who Called: CAYLA GOODEN [[id number]]          New Prescription or Refill : New      RX Name and Strength:  pregabalin (LYRICA) 150 MG capsule        30 day or 90 day RX: 30      Preferred Pharmacy: [place] [id number]   Phone:  [phone]  Fax:  [phone]            Would the patient rather a call back or a response via [facility]? Call back        Best Call Back Number:  [phone]        Additional Information: Patient says pharmacy has that the patient is supposed to be taking meds 2 times a day but the instructions says 3. Pharmacy says new prescription is needed showing 3 time daily

## 2023-06-02 ENCOUNTER — PATIENT MESSAGE (OUTPATIENT)
Dept: PHARMACY | Facility: CLINIC | Age: 78
End: 2023-06-02
Payer: MEDICARE

## 2023-06-02 ENCOUNTER — SPECIALTY PHARMACY (OUTPATIENT)
Dept: PHARMACY | Facility: CLINIC | Age: 78
End: 2023-06-02
Payer: MEDICARE

## 2023-06-02 NOTE — TELEPHONE ENCOUNTER
Specialty Pharmacy - Refill Coordination    Specialty Medication Orders Linked to Encounter      Flowsheet Row Most Recent Value   Medication #1 evolocumab (REPATHA SURECLICK) 140 mg/mL PnIj (Order#[id number], Rx#[id number])            Refill Questions - Documented Responses      Flowsheet Row Most Recent Value   Patient Availability and HIPAA Verification    Does patient want to proceed with activity? Yes   HIPAA/medical authority confirmed? Yes   Relationship to patient of person spoken to? Child   Refill Screening Questions    Would patient like to speak to a pharmacist? No   When does the patient need to receive the medication? 06/07/23   Refill Delivery Questions    How will the patient receive the medication? MEDRx   When does the patient need to receive the medication? 06/07/23   Shipping Address Home   Address in [facility] confirmed and updated if neccessary? Yes   Expected Copay ($) 0   Is the patient able to afford the medication copay? Yes   Payment Method zero copay   Days supply of Refill 28   Supplies needed? No supplies needed   Refill activity completed? Yes   Refill activity plan Refill scheduled   Shipment/Pickup Date: 06/05/23            Current Outpatient Medications   Medication Sig    acetaminophen (TYLENOL) 500 MG tablet Take 2 tablets (1,000 mg total) by mouth every 8 (eight) hours as needed for Pain.    allopurinoL (ZYLOPRIM) 300 MG tablet Take 1 tablet (300 mg total) by mouth once daily.    aspirin (ECOTRIN) 81 MG EC tablet Take 1 tablet (81 mg total) by mouth once daily.    blood sugar diagnostic Strp 1 each by Misc.(Non-Drug; Combo Route) route 4 (four) times daily.    blood-glucose meter Misc Follow package directions (Patient taking differently: Follow package directions)    blood-glucose meter,continuous (DEXCOM G6 ) Misc 1 each by Misc.(Non-Drug; Combo Route) route continuous prn.    blood-glucose sensor (DEXCOM G6 SENSOR) Nicole 3 each by Misc.(Non-Drug; Combo Route)  route continuous prn. Change every 10 days.    blood-glucose transmitter (DEXCOM G6 TRANSMITTER) Nicole 1 each by Misc.(Non-Drug; Combo Route) route continuous prn.    cloNIDine (CATAPRES) 0.1 MG tablet Take 1 tablet (0.1 mg total) by mouth 2 (two) times daily.    evolocumab (REPATHA SURECLICK) 140 mg/mL PnIj Inject 1 mL (140 mg total) into the skin every 14 (fourteen) days.    fenofibrate micronized (LOFIBRA) 134 MG Cap Take 1 capsule (134 mg total) by mouth daily with breakfast.    furosemide (LASIX) 40 MG tablet Take 1 tablet (40 mg total) by mouth once daily.    HYDROcodone-acetaminophen (NORCO)  mg per tablet Take 1 tablet by mouth every 8 (eight) hours as needed for Pain.    hydrocortisone (ANUSOL-HC) 2.5 % rectal cream Procto-Med HC 2.5 % topical cream perineal applicator    insulin aspart U-100 (NOVOLOG) 100 unit/mL (3 mL) InPn pen Inject 12 Units into the skin 3 (three) times daily with meals. Plus correction scale. Max TDD 40units.    insulin detemir U-100, Levemir, (LEVEMIR FLEXPEN) 100 unit/mL (3 mL) InPn pen INJECT 20 UNITS into THE SKIN TWICE DAILY    insulin detemir U-100, Levemir, (LEVEMIR FLEXTOUCH U-100 INSULN) 100 unit/mL (3 mL) InPn pen Inject 20 Units into the skin 2 (two) times daily.    ipratropium-albuteroL (COMBIVENT)  mcg/actuation inhaler Inhale 1 puff into the lungs by mouth every 6 (six) hours.    losartan (COZAAR) 50 MG tablet Take 1 tablet (50 mg total) by mouth once daily.    metoclopramide HCl (REGLAN) 5 MG tablet Take 1 tablet (5 mg total) by mouth 4 (four) times daily as needed (nausea, prevention).    metoprolol succinate (TOPROL-XL) 100 MG 24 hr tablet Take 1 tablet (100 mg total) by mouth once daily.    NIFEdipine (PROCARDIA-XL) 30 MG (OSM) 24 hr tablet Take 1 tablet (30 mg total) by mouth 2 (two) times a day.    nitroGLYCERIN (NITROSTAT) 0.4 MG SL tablet place 1 TABLET UNDER THE TONGUE every five minutes AS NEEDED FOR CHEST pain    pantoprazole (PROTONIX) 40 MG tablet  "Take 1 tablet (40 mg total) by mouth once daily.    pen needle, diabetic (BD ULTRA-FINE HIEN PEN NEEDLE) 32 gauge x 5/32" Ndle 1 each by Misc.(Non-Drug; Combo Route) route 4 (four) times daily.    pregabalin (LYRICA) 150 MG capsule Take 1 capsule (150 mg total) by mouth 3 (three) times daily.    senna-docusate 8.6-50 mg (PERICOLACE) 8.6-50 mg per tablet Take 1 tablet by mouth 2 (two) times daily as needed for Constipation.    sertraline (ZOLOFT) 100 MG tablet Take 1 tablet (100 mg total) by mouth once daily.    sulfamethoxazole-trimethoprim 800-160mg (BACTRIM DS) 800-160 mg Tab sulfamethoxazole 800 mg-trimethoprim 160 mg tablet    teriparatide 20 mcg/dose (620mcg/2.48mL) PnIj Inject 20 mcg into the skin once daily.    triamcinolone acetonide 0.1% (KENALOG) 0.1 % cream Apply to affected areas of body twice daily as needed rash. Do not use on face, underarms, or groin. (Patient not taking: Reported on 5/11/2023)    TRUEPLUS LANCETS 30 gauge Misc TEST AS DIRECTED FOUR TIMES DAILY   Last reviewed on 5/19/2023  3:24 PM by Jose L Méndez MD    Review of patient's allergies indicates:   Allergen Reactions    Bleach (sodium hypochlorite) Shortness Of Breath    Nitrofurantoin macrocrystalline Anaphylaxis    Lipitor [atorvastatin] Diarrhea and Rash    Nsaids (non-steroidal anti-inflammatory drug)     Pcn [penicillins]     Toradol [ketorolac]     Last reviewed on  5/19/2023 3:24 PM by Jose L Méndez      Tasks added this encounter   No tasks added.   Tasks due within next 3 months   No tasks due.     Yessy Diaz, PharmD  [facility]  [address] [place] [id number]  Phone: [phone]  Fax: [phone]        "

## 2023-06-05 ENCOUNTER — TELEPHONE (OUTPATIENT)
Dept: ENDOCRINOLOGY | Facility: CLINIC | Age: 78
End: 2023-06-05
Payer: MEDICARE

## 2023-06-05 ENCOUNTER — PATIENT MESSAGE (OUTPATIENT)
Dept: ENDOCRINOLOGY | Facility: CLINIC | Age: 78
End: 2023-06-05
Payer: MEDICARE

## 2023-06-05 NOTE — TELEPHONE ENCOUNTER
Spoke w/ pt informing her Dr Peters's last day is on 6/7/23 and we will have to refer her to another endocrinologist within [facility]. Sent form to pt via pt portal. Pt confirmed appt cancellation and VU

## 2023-06-07 ENCOUNTER — TELEPHONE (OUTPATIENT)
Dept: PAIN MEDICINE | Facility: CLINIC | Age: 78
End: 2023-06-07
Payer: MEDICARE

## 2023-06-07 NOTE — TELEPHONE ENCOUNTER
----- Message from Roosevelt Pantoja sent at 6/7/2023 10:51 AM CDT -----  .Type: Patient Call Back    Who called: [facility]    What is the request in detail:    Can the clinic reply by [facility]? NO    Would the patient rather a call back or a response via [facility]?  [phone]    Best call back number: [phone]    Additional Information:[phone]

## 2023-06-13 ENCOUNTER — SPECIALTY PHARMACY (OUTPATIENT)
Dept: PHARMACY | Facility: CLINIC | Age: 78
End: 2023-06-13
Payer: MEDICARE

## 2023-06-13 ENCOUNTER — PATIENT MESSAGE (OUTPATIENT)
Dept: PHARMACY | Facility: CLINIC | Age: 78
End: 2023-06-13
Payer: MEDICARE

## 2023-06-13 ENCOUNTER — TELEPHONE (OUTPATIENT)
Dept: PAIN MEDICINE | Facility: CLINIC | Age: 78
End: 2023-06-13
Payer: MEDICARE

## 2023-06-13 NOTE — TELEPHONE ENCOUNTER
Specialty Pharmacy - Refill Coordination    Specialty Medication Orders Linked to Encounter      Flowsheet Row Most Recent Value   Medication #1 teriparatide 20 mcg/dose (620mcg/2.48mL) PnIj (Order#[id number], Rx#[id number])            Refill Questions - Documented Responses      Flowsheet Row Most Recent Value   Patient Availability and HIPAA Verification    Does patient want to proceed with activity? Yes   HIPAA/medical authority confirmed? Yes   Relationship to patient of person spoken to? Child   Refill Screening Questions    Changes to allergies? No   Changes to medications? No   New conditions since last clinic visit? No   Unplanned office visit, urgent care, ED, or hospital admission in the last 4 weeks? No   How does patient/caregiver feel medication is working? Good   Financial problems or insurance changes? No   How many doses of your specialty medications were missed in the last 4 weeks? 0   Would patient like to speak to a pharmacist? No   When does the patient need to receive the medication? 06/23/23   Refill Delivery Questions    How will the patient receive the medication? MEDRx   When does the patient need to receive the medication? 06/23/23   Shipping Address Home   Address in [facility] confirmed and updated if neccessary? Yes   Expected Copay ($) 0   Is the patient able to afford the medication copay? Yes   Payment Method zero copay   Days supply of Refill 28   Supplies needed? No supplies needed   Refill activity completed? Yes   Refill activity plan Refill scheduled   Shipment/Pickup Date: 06/20/23            Current Outpatient Medications   Medication Sig    acetaminophen (TYLENOL) 500 MG tablet Take 2 tablets (1,000 mg total) by mouth every 8 (eight) hours as needed for Pain.    allopurinoL (ZYLOPRIM) 300 MG tablet Take 1 tablet (300 mg total) by mouth once daily.    aspirin (ECOTRIN) 81 MG EC tablet Take 1 tablet (81 mg total) by mouth once daily.    blood sugar diagnostic Strp 1 each  by Misc.(Non-Drug; Combo Route) route 4 (four) times daily.    blood-glucose meter Misc Follow package directions (Patient taking differently: Follow package directions)    blood-glucose meter,continuous (DEXCOM G6 ) Misc 1 each by Misc.(Non-Drug; Combo Route) route continuous prn.    blood-glucose sensor (DEXCOM G6 SENSOR) Nicole 3 each by Misc.(Non-Drug; Combo Route) route continuous prn. Change every 10 days.    blood-glucose transmitter (DEXCOM G6 TRANSMITTER) Nicole 1 each by Misc.(Non-Drug; Combo Route) route continuous prn.    cloNIDine (CATAPRES) 0.1 MG tablet Take 1 tablet (0.1 mg total) by mouth 2 (two) times daily.    evolocumab (REPATHA SURECLICK) 140 mg/mL PnIj Inject 1 mL (140 mg total) into the skin every 14 (fourteen) days.    fenofibrate micronized (LOFIBRA) 134 MG Cap Take 1 capsule (134 mg total) by mouth daily with breakfast.    furosemide (LASIX) 40 MG tablet Take 1 tablet (40 mg total) by mouth once daily.    HYDROcodone-acetaminophen (NORCO)  mg per tablet Take 1 tablet by mouth every 8 (eight) hours as needed for Pain.    hydrocortisone (ANUSOL-HC) 2.5 % rectal cream Procto-Med HC 2.5 % topical cream perineal applicator    insulin aspart U-100 (NOVOLOG) 100 unit/mL (3 mL) InPn pen Inject 12 Units into the skin 3 (three) times daily with meals. Plus correction scale. Max TDD 40units.    insulin detemir U-100, Levemir, (LEVEMIR FLEXPEN) 100 unit/mL (3 mL) InPn pen INJECT 20 UNITS into THE SKIN TWICE DAILY    insulin detemir U-100, Levemir, (LEVEMIR FLEXTOUCH U-100 INSULN) 100 unit/mL (3 mL) InPn pen Inject 20 Units into the skin 2 (two) times daily.    ipratropium-albuteroL (COMBIVENT)  mcg/actuation inhaler Inhale 1 puff into the lungs by mouth every 6 (six) hours.    losartan (COZAAR) 50 MG tablet Take 1 tablet (50 mg total) by mouth once daily.    metoclopramide HCl (REGLAN) 5 MG tablet Take 1 tablet (5 mg total) by mouth 4 (four) times daily as needed (nausea, prevention).  "   metoprolol succinate (TOPROL-XL) 100 MG 24 hr tablet Take 1 tablet (100 mg total) by mouth once daily.    NIFEdipine (PROCARDIA-XL) 30 MG (OSM) 24 hr tablet Take 1 tablet (30 mg total) by mouth 2 (two) times a day.    nitroGLYCERIN (NITROSTAT) 0.4 MG SL tablet place 1 TABLET UNDER THE TONGUE every five minutes AS NEEDED FOR CHEST pain    pantoprazole (PROTONIX) 40 MG tablet Take 1 tablet (40 mg total) by mouth once daily.    pen needle, diabetic (BD ULTRA-FINE HIEN PEN NEEDLE) 32 gauge x 5/32" Ndle 1 each by Misc.(Non-Drug; Combo Route) route 4 (four) times daily.    pregabalin (LYRICA) 150 MG capsule Take 1 capsule (150 mg total) by mouth 3 (three) times daily.    senna-docusate 8.6-50 mg (PERICOLACE) 8.6-50 mg per tablet Take 1 tablet by mouth 2 (two) times daily as needed for Constipation.    sertraline (ZOLOFT) 100 MG tablet Take 1 tablet (100 mg total) by mouth once daily.    sulfamethoxazole-trimethoprim 800-160mg (BACTRIM DS) 800-160 mg Tab sulfamethoxazole 800 mg-trimethoprim 160 mg tablet    teriparatide 20 mcg/dose (620mcg/2.48mL) PnIj Inject 20 mcg into the skin once daily.    triamcinolone acetonide 0.1% (KENALOG) 0.1 % cream Apply to affected areas of body twice daily as needed rash. Do not use on face, underarms, or groin. (Patient not taking: Reported on 5/11/2023)    TRUEPLUS LANCETS 30 gauge Misc TEST AS DIRECTED FOUR TIMES DAILY   Last reviewed on 5/19/2023  3:24 PM by Jose L Méndez MD    Review of patient's allergies indicates:   Allergen Reactions    Bleach (sodium hypochlorite) Shortness Of Breath    Nitrofurantoin macrocrystalline Anaphylaxis    Lipitor [atorvastatin] Diarrhea and Rash    Nsaids (non-steroidal anti-inflammatory drug)     Pcn [penicillins]     Toradol [ketorolac]     Last reviewed on  5/19/2023 3:24 PM by Jose L Méndez      Tasks added this encounter   No tasks added.   Tasks due within next 3 months   7/10/2023 - Clinical Assessment (1 year recurrence)     Robert, " PharmD  [facility]  [address]  [facility] [address]  Phone: [phone]  Fax: [phone]

## 2023-06-13 NOTE — TELEPHONE ENCOUNTER
Staff tried to reach pt no mailbox set up but patient needs appointment for pain med refill due to we haven't saw pt since 3/16/23.

## 2023-06-13 NOTE — TELEPHONE ENCOUNTER
----- Message from Estelle Lane sent at 6/13/2023 11:29 AM CDT -----  Regarding: Refill request  Type:  RX Refill Request    Who Called: CAYLA GOODEN [[id number]]    Refill or New Rx: Refill     RX Name and Strength: HYDROcodone-acetaminophen (NORCO)  mg per tablet    How is the patient currently taking it? (ex. 1XDay) 3xday     Is this a 30 day or 90 day RX: 30 days     Preferred Pharmacy with phone number: [facility]    Local or Mail Order: local    Ordering Provider: Randall Linares Call Back Number: [phone]    Additional Information:

## 2023-06-15 ENCOUNTER — TELEPHONE (OUTPATIENT)
Dept: PAIN MEDICINE | Facility: CLINIC | Age: 78
End: 2023-06-15
Payer: MEDICARE

## 2023-06-15 DIAGNOSIS — G89.4 CHRONIC PAIN DISORDER: ICD-10-CM

## 2023-06-15 DIAGNOSIS — E08.42 DIABETIC POLYNEUROPATHY ASSOCIATED WITH DIABETES MELLITUS DUE TO UNDERLYING CONDITION: ICD-10-CM

## 2023-06-15 DIAGNOSIS — G89.4 CHRONIC PAIN SYNDROME: ICD-10-CM

## 2023-06-15 RX ORDER — HYDROCODONE BITARTRATE AND ACETAMINOPHEN 10; 325 MG/1; MG/1
1 TABLET ORAL EVERY 8 HOURS PRN
Qty: 90 TABLET | Refills: 0 | OUTPATIENT
Start: 2023-06-15 | End: 2023-07-15

## 2023-06-15 NOTE — TELEPHONE ENCOUNTER
----- Message from Korina Toledo sent at 6/15/2023 11:10 AM CDT -----  Regarding: Patient call back  .Type: Patient Call Back    Who called:self     What is the request in detail:needs advice on what to do. Patient is out of medication and can not get a refill until she is seen in office.     Can the clinic reply by [facility]?no    Would the patient rather a call back or a response via [facility]? Call     Best call back number:[phone]    Additional Information:.  [facility]  [address] [place] [address]  Phone: [phone] Fax: [phone]

## 2023-06-15 NOTE — TELEPHONE ENCOUNTER
----- Message from Korina Toledo sent at 6/15/2023 11:10 AM CDT -----  Regarding: Patient call back  .Type: Patient Call Back    Who called:self     What is the request in detail:needs advice on what to do. Patient is out of medication and can not get a refill until she is seen in office.     Can the clinic reply by [facility]?no    Would the patient rather a call back or a response via [facility]? Call     Best call back number:[phone]    Additional Information:.  [facility]  [address] [place] [address]  Phone: [phone] Fax: [phone]

## 2023-06-15 NOTE — TELEPHONE ENCOUNTER
----- Message from Roosevelt Pantoja sent at 6/15/2023 11:41 AM CDT -----  Name of Who is Calling:CAYLA GOODEN [[id number]]           What is the request in detail: pt is needing an certain amount of meds until her upcoming appt as she is in excruciating pain HYDROcodone-acetaminophen (NORCO)  mg per tablet.Please contact to further discuss and advise.      [facility]   Phone:  [phone]  Fax:  [phone]            Can the clinic reply by [facility]:[phone]           [facility][phone]

## 2023-06-16 ENCOUNTER — OFFICE VISIT (OUTPATIENT)
Dept: PAIN MEDICINE | Facility: CLINIC | Age: 78
End: 2023-06-16
Payer: MEDICARE

## 2023-06-16 VITALS
BODY MASS INDEX: 36.92 KG/M2 | SYSTOLIC BLOOD PRESSURE: 138 MMHG | HEIGHT: 61 IN | DIASTOLIC BLOOD PRESSURE: 70 MMHG | HEART RATE: 93 BPM

## 2023-06-16 DIAGNOSIS — E11.40 PAINFUL DIABETIC NEUROPATHY: Primary | ICD-10-CM

## 2023-06-16 DIAGNOSIS — Z51.81 ENCOUNTER FOR THERAPEUTIC DRUG MONITORING: ICD-10-CM

## 2023-06-16 DIAGNOSIS — G89.4 CHRONIC PAIN DISORDER: ICD-10-CM

## 2023-06-16 DIAGNOSIS — M54.15 RADICULOPATHY OF THORACOLUMBAR REGION: ICD-10-CM

## 2023-06-16 DIAGNOSIS — E13.42 POLYNEUROPATHY DUE TO SECONDARY DIABETES: ICD-10-CM

## 2023-06-16 DIAGNOSIS — G89.4 CHRONIC PAIN SYNDROME: ICD-10-CM

## 2023-06-16 DIAGNOSIS — E08.42 DIABETIC POLYNEUROPATHY ASSOCIATED WITH DIABETES MELLITUS DUE TO UNDERLYING CONDITION: ICD-10-CM

## 2023-06-16 PROCEDURE — 99214 PR OFFICE/OUTPT VISIT, EST, LEVL IV, 30-39 MIN: ICD-10-PCS | Mod: S$PBB,,, | Performed by: NURSE PRACTITIONER

## 2023-06-16 PROCEDURE — 99215 OFFICE O/P EST HI 40 MIN: CPT | Mod: PBBFAC | Performed by: NURSE PRACTITIONER

## 2023-06-16 PROCEDURE — 80326 AMPHETAMINES 5 OR MORE: CPT | Performed by: NURSE PRACTITIONER

## 2023-06-16 PROCEDURE — 99214 OFFICE O/P EST MOD 30 MIN: CPT | Mod: S$PBB,,, | Performed by: NURSE PRACTITIONER

## 2023-06-16 PROCEDURE — 99999 PR PBB SHADOW E&M-EST. PATIENT-LVL V: ICD-10-PCS | Mod: PBBFAC,,, | Performed by: NURSE PRACTITIONER

## 2023-06-16 PROCEDURE — 99999 PR PBB SHADOW E&M-EST. PATIENT-LVL V: CPT | Mod: PBBFAC,,, | Performed by: NURSE PRACTITIONER

## 2023-06-16 RX ORDER — HYDROCODONE BITARTRATE AND ACETAMINOPHEN 10; 325 MG/1; MG/1
1 TABLET ORAL EVERY 8 HOURS PRN
Qty: 90 TABLET | Refills: 0 | Status: SHIPPED | OUTPATIENT
Start: 2023-06-16 | End: 2023-07-12 | Stop reason: SDUPTHER

## 2023-06-16 NOTE — PROGRESS NOTES
Chronic patient Established Note (Follow up visit)    Interval History 6/16/2023:  The patient returns to clinic today for follow up of back and leg pain. She continues to report low back pain. She also reports diabetic neuropathy to her bilateral feet. Her pain is worse with standing and walking. She has tried Qutenza patches with benefit in the past. She continues to take Robaxin and Lyrica. She also takes Norco as needed with benefit and without adverse effects. She denies any other health changes. Her pain today is 10/10.    Interval History 3/17/2023:  Mrs Waters presents for follow up of chronic, continuous neuropathic pain to [facility]. She is s/p Duartero SCS trial and unfortunately she did not get the relief she was hoping for and 50% at best relief but this was not consistent. She has no constitutional s/s concerning for infection and denies newer neurological changes since trial. She continues with medication mgt and states Qutenza application has been providing significant benefit.     Interval History 2/10/2023:  Mrs Waters presents for follow up of chronic lower back painn and radicular pain in conjunction with PDN to bilateral feet. She is doing fair with medication mgt of Norco, Robaxin, and Lyrica and does need refill at this time. She denies SE of medications. She is also here for Qutenza application today. She is scheduled to have upcoming SCS trial with [facility] to aslo address her PDN.     Interval History 12/13/2022:  Mrs Waters presents for follow up of chronic pain. She is ready to repeat Qutenza application as it has been >91 days and she had significant improvement of symptoms of PDN. She recently had repeat A1C and just above 10.0 but is decreasing. She continues to take medication with benefit and denies SE of medication. Medication regimen include Norco 10/325mg TID, Robaxin 500mg and Lyrica 150mg.     Interval History 10/12/2022:  Mrs Waters presents for follow up of chronic neuropathy. She is s/p  qutenza patch placement with improved functioning and neuropathy control. Unfortunately her A1C was above 11 which inhibits her from Nevro SCS trial at this time. She is eager to have SCS trial. She denies new areas of pain or neurological changes. She continued to take  Norco 10/325mg TID, Robaxin 500mg and Lyrica 150mg TID with benefit and denies significant SE of medications.     Interval History 9/8/2022:  Mrs Waters presents for follow up of chronic lower back painn and radicular pain in conjunction with PDN to bilateral feet. She is doing fair with medication mgt of Norco, Robaxin, and Lyrica and does need refill at this time. She denies SE of medications. She is patiently awaiting her A1C to become lower than 10 to proceed with SCS trial.     Interval History 8/12/2022:  Mrs Waters presents for delayed FU. She has had continuous pain to lumbar and radicular pain but also peripheral neuropathy complaints. Over interval she has had CVA but doing fair. Her A1C has unfortunately elevated above 10 at this time and SCS trial placed on hold but phychiatric evaluation complete. She continues to take Norco 10/325mg TID, Robaxin 500mg TID and Lyrica 150mg TID with some benefit and denies SE of medications. No s/s concerning for cauda equina.     Interval History 4/12/2022:  Patient returns to clinic today for follow-up. Her neuropathic pain continues to be an issue for her, but she says that she is able to manage. She is taking Norco 10-325mg TID, Robaxin 500mg TID, and Lyrica 150mg TID without any adverse side effects. She denies any changes in her symptoms. She would like to proceed with SCS trial. Discussed last time that her HbA1c should be <10, was 9.8 on most recent labs. Seen by psychology and cleared for SCS.     Interval History 2/14/2022:  Mrs Waters presents for follow up. Pt states overall neuropathy continues. Since last visit she has been stable with medication mgt and takign Norco 10/325mg TID, Robaxin  500mg TID and Lyrica 150mg TID. She denies new areas of pain or neurological changes. Medication adequate to control pain without adverse SE.      Interval History 12/21/2021:  Pt presents for urgent evaluation s/p fall in kitchen last night. Pt unsure of LOC or head trauma but states some amnesia of events. Pt is having left knee pain, B ankle pain L>R and lower back/buttock pain. Daughter further states tremor like activity last night. During visit daughter asked for bedside commode due to inability to ambulate.  Pt during visit appeared somnolent, pale and began vomiting. Discussed going to ER for further evaluation.      Interval History 12/14/2021:  Mrs Waters presents for follow up of chronic pain complaints. She is hopeful she will have A1C lower soon to move forward with SCS. She is doing fair with medication mgt alone at this time and denies SE of medications. Pt is currently taking Norco 10/325mg TID PRN, Lyrica 150mg TID, and Robaxin 500mg TID. She denies new areas of pain or neurological changes.      Interval History 10/14/2021:  The Pt presents for follow up and s/p B Lumbar sympathetic blocks. Pt states this has done well but ready to proceed with SCS trial. She is aware A1C must be under tight control and Pt and daughter re-iterate knowing this. Pt also mentions DKA admission and diagnosis of vasculitis as well over interval. Pt continues to take hydrocodone 10/325 mg TID PRN, Lyrica 150 mg TID, and Robaxin 500mg TID. She denies any SE of medication, denies new neurological changes.      Interval Hx 09/16/2021  Indira Waters presents to the clinic for a follow-up appointment for f/u after bilateral lumbar sympathetic block on 8/25/21.Patient reports >50% relief after lumbar sympathetic block that lasted 2 weeks when she then developed a UTI/DKA, was admitted to the hospital and pain recurred.  Current pain intensity is 8/10.     Interval History 8/12/2021:  Indira Waters presents to the  clinic for a follow-up appointment for BLE diabetic neuropathy, painful. Continues with Norco 10/325mg, Lyrica 150mg TID, Robaxin 750mg daily PRN. She had to cancel previously scheduled lumbar sympathetic block 2/2 lithotripsy for painful kidney stones, which have now passed. She still has intermittent pain with urination but overall that pain is improving. She had to cancel previous angiogram for this same reason - this has not been rescheduled yet. She denies any change in her LE pain. Denies any new neurological sxs in BLEs.     Interval History 6/10/21:  Indira Waters presents to the clinic for a 2 week follow-up appointment from lumbar sympathetic nerve block in early May. She reports minimal relief from this intervention, but did note that it helped some, briefly. Since the last visit, Indira Waters states the pain has been persistant. Current pain intensity is 10/10. Patient has chronic generalized diffuse pain, most pronounced in her BL lower extremities 2/2 diabetic neuropathy. HSe states that the pain is a little better than at her last visit. Most days are 10/10, but some days are better than others. Her pain is aggravated by exertion, walking, or sitting/standing for extended periods of time. He pain is mildly improved by rest and medications. She is currently taking Lyrica 150 PO TID, Zoloft, and Norco 10-325mg TID PRN. She states that the pain meds are helping but she is still constantly in pain and unable to participate in her ADLs. She has now established care with PCP for assistance w/ poorly controlled T2DM, last A1c 11.4. She has not yet established care w/ Rheumatology for fibromyalgia management.    Interval HPI 4/15/21:  Patient returns for follow up of chronic generalized diffuse pain. At the last visit, had EMG (not yet completed), lumbar and hip x-ray imaging ordered. She was also referred to Rheumatology for fibromyalgia management and referral to PCP for DM2 management and weaning of  "zoloft for transition to cymbalta for treatment of neuropathy. She had her Lyrica increased to 150mg PO TID, and prescribed Norco 10mg TID with opioid contract signed. She has been taking Norco 10mg TID and it has been helping her "bad" pains but does not take all of her pain away. She has not yet been set up with her new PCP or rheumatologist yet for fibromyalgia. Still continues to have generalized pain everywhere including feet, hips, legs, lower and upper back, neck and shoulder pain. Continues to have neuropathy in the bilateral lower extremities due to uncontrolled DM2.    Initial Visit 3/11/21:  Indira Waters presents to the clinic for the evaluation of chronic pain. Complaining of pain everywhere including feet, legs, hips, lower and upper back, neck, shoulder. Pain started 5+ years ago. Pain 10/10 at worse. She was referred here by her PCP Dr. Ramos who she has been seeing for over 6 years. She states her pain is aggravated by any physical activity/movement. She takes lyrica, robaxin, and Norco 10 TID with mild relief of pain. Per patient, she was referred here by her PCP for medication refill. She has not tried physical therapy for several years, she states it did not help last time she was in PT. She says she is trying to exercise daily by doing yoga. She walks with a walker. She has numerous comorbidities including DM2 (Last A1C 9+ per prior family medicine note in Jan 2021), fibromyalgia, lupus, DDD. She states she would like to find a new PCP as she no longer lives near her old one. Patient denies night fever/night sweats, urinary incontinence, bowel incontinence and significant weight loss.      Pain Disability Index Review:  Last 3 PDI Scores 6/16/2023 3/16/2023 2/10/2023   Pain Disability Index (PDI) 41 63 50     Pain Medications:  Norco 10-325mg TID, Robaxin 500mg TID, and Lyrica 150mg TID    Opioid Contract: yes     report:  Reviewed and consistent with medication use as prescribed.    Pain " Procedures:    - reports possible back injection 10+ years ago  - Lumbar sympathetic nerve block 05/05/21 - Dr. Pereira - minimal relief    Physical Therapy/Home Exercise: no     Imaging:   Hip X-ray 4/7/21  FINDINGS:  Osseous structures appear intact without evidence of fracture or osseous destructive process.  No apparent dislocation.     Modest degenerative change or significant joint space narrowing.     Lumbar X-ray 4/7/21  FINDINGS:  Diffuse bony demineralization.  Vertebral bodies are normal in height without evidence of fracture.  No pars defects.     Normal sagittal alignment is preserved.  No spondylolisthesis. No abnormal translation with flexion and extension.     Intervertebral disc heights are well maintained.  Mild facet arthropathy in the lower lumbar spine.     Mild scattered vascular calcification.     Impression:     No evidence of fracture or malalignment.     Mild facet arthropathy in the lower lumbar spine.     Diffuse bony demineralization.  Consider correlation with DEXA.    Allergies:   Review of patient's allergies indicates:   Allergen Reactions    Bleach (sodium hypochlorite) Shortness Of Breath    Nitrofurantoin macrocrystalline Anaphylaxis    Lipitor [atorvastatin] Diarrhea and Rash    Nsaids (non-steroidal anti-inflammatory drug)     Pcn [penicillins]     Toradol [ketorolac]        Current Medications:   Current Outpatient Medications   Medication Sig Dispense Refill    acetaminophen (TYLENOL) 500 MG tablet Take 2 tablets (1,000 mg total) by mouth every 8 (eight) hours as needed for Pain.  0    allopurinoL (ZYLOPRIM) 300 MG tablet Take 1 tablet (300 mg total) by mouth once daily. 90 tablet 1    aspirin (ECOTRIN) 81 MG EC tablet Take 1 tablet (81 mg total) by mouth once daily. 30 tablet 0    blood sugar diagnostic Strp 1 each by Misc.(Non-Drug; Combo Route) route 4 (four) times daily. 200 each 0    blood-glucose meter Misc Follow package directions (Patient taking differently: Follow  "package directions) 1 each 0    cloNIDine (CATAPRES) 0.1 MG tablet TAKE ONE TABLET BY MOUTH TWICE DAILY 180 tablet 0    evolocumab (REPATHA SURECLICK) 140 mg/mL PnIj Inject 1 mL (140 mg total) into the skin every 14 (fourteen) days. 8 each 3    fenofibrate micronized (LOFIBRA) 134 MG Cap Take 1 capsule (134 mg total) by mouth daily with breakfast. 90 capsule 3    furosemide (LASIX) 40 MG tablet Take 1 tablet (40 mg total) by mouth once daily. 30 tablet 6    hydrocortisone (ANUSOL-HC) 2.5 % rectal cream Procto-Med HC 2.5 % topical cream perineal applicator      insulin aspart U-100 (NOVOLOG) 100 unit/mL (3 mL) InPn pen Inject 12 Units into the skin 3 (three) times daily with meals. Plus correction scale. Max TDD 40units. 15 mL 3    insulin detemir U-100, Levemir, (LEVEMIR FLEXPEN) 100 unit/mL (3 mL) InPn pen INJECT 20 UNITS into THE SKIN TWICE DAILY 15 mL 11    insulin detemir U-100, Levemir, (LEVEMIR FLEXTOUCH U-100 INSULN) 100 unit/mL (3 mL) InPn pen Inject 20 Units into the skin 2 (two) times daily. 15 mL 3    ipratropium-albuteroL (COMBIVENT)  mcg/actuation inhaler Inhale 1 puff into the lungs by mouth every 6 (six) hours. 4 g 0    metoclopramide HCl (REGLAN) 5 MG tablet Take 1 tablet (5 mg total) by mouth 4 (four) times daily as needed (nausea, prevention). 30 tablet 3    metoprolol succinate (TOPROL-XL) 100 MG 24 hr tablet Take 1 tablet (100 mg total) by mouth once daily. 90 tablet 3    nitroGLYCERIN (NITROSTAT) 0.4 MG SL tablet place 1 TABLET UNDER THE TONGUE every five minutes AS NEEDED FOR CHEST pain 25 tablet 0    pantoprazole (PROTONIX) 40 MG tablet Take 1 tablet (40 mg total) by mouth once daily. 30 tablet 0    pen needle, diabetic (BD ULTRA-FINE HIEN PEN NEEDLE) 32 gauge x 5/32" Ndle 1 each by Misc.(Non-Drug; Combo Route) route 4 (four) times daily. 400 each 3    pregabalin (LYRICA) 150 MG capsule Take 1 capsule (150 mg total) by mouth 3 (three) times daily. 90 capsule 2    senna-docusate 8.6-50 mg " (PERICOLACE) 8.6-50 mg per tablet Take 1 tablet by mouth 2 (two) times daily as needed for Constipation. 60 tablet 0    sertraline (ZOLOFT) 100 MG tablet Take 1 tablet (100 mg total) by mouth once daily. 90 tablet 3    sulfamethoxazole-trimethoprim 800-160mg (BACTRIM DS) 800-160 mg Tab sulfamethoxazole 800 mg-trimethoprim 160 mg tablet      teriparatide 20 mcg/dose (620mcg/2.48mL) PnIj Inject 20 mcg into the skin once daily. 2.48 mL 11    triamcinolone acetonide 0.1% (KENALOG) 0.1 % cream Apply to affected areas of body twice daily as needed rash. Do not use on face, underarms, or groin. 454 g 0    TRUEPLUS LANCETS 30 gauge Misc TEST AS DIRECTED FOUR TIMES DAILY 200 each 2    blood-glucose meter,continuous (DEXCOM G6 ) Misc 1 each by Misc.(Non-Drug; Combo Route) route continuous prn. 1 each PRN    blood-glucose sensor (DEXCOM G6 SENSOR) Nicole 3 each by Misc.(Non-Drug; Combo Route) route continuous prn. Change every 10 days. 3 each PRN    blood-glucose transmitter (DEXCOM G6 TRANSMITTER) Nicole 1 each by Misc.(Non-Drug; Combo Route) route continuous prn. 1 each PRN    losartan (COZAAR) 50 MG tablet Take 1 tablet (50 mg total) by mouth once daily. 90 tablet 0    NIFEdipine (PROCARDIA-XL) 30 MG (OSM) 24 hr tablet Take 1 tablet (30 mg total) by mouth 2 (two) times a day. 180 tablet 0     No current facility-administered medications for this visit.       REVIEW OF SYSTEMS:    GENERAL:  No weight loss, malaise or fevers.  HEENT:  Negative for frequent or significant headaches.  NECK:  Negative for lumps, goiter, pain and significant neck swelling.  RESPIRATORY:  Negative for cough, wheezing or shortness of breath.  CARDIOVASCULAR:  Negative for chest pain, leg swelling or palpitations.  GI:  Negative for abdominal discomfort, blood in stools or black stools or change in bowel habits.  MUSCULOSKELETAL:  See HPI.  SKIN:  Negative for lesions, rash, and itching.  PSYCH:  Negative for sleep disturbance, mood disorder and  recent psychosocial stressors.  HEMATOLOGY/LYMPHOLOGY:  Negative for prolonged bleeding, bruising easily or swollen nodes.  NEURO:   No history of headaches, syncope, paralysis, seizures or tremors.  All other reviewed and negative other than HPI.    Past Medical History:  Past Medical History:   Diagnosis Date    Arthritis     Back pain     Cancer     ovarian    Cervical cancer     Depression     Diabetes mellitus     Fibromyalgia     Heart attack     Hyperlipidemia     Hypertension     Lupus     Stroke     slight left sided weakness       Past Surgical History:  Past Surgical History:   Procedure Laterality Date    APPENDECTOMY       SECTION      2    CORONARY ANGIOGRAPHY N/A 2022    Procedure: ANGIOGRAM, CORONARY ARTERY;  Surgeon: Jose L Méndez MD;  Location: [facility] CATH LAB;  Service: Cardiology;  Laterality: N/A;    HYSTERECTOMY      with USO for cervical cancer    INJECTION OF ANESTHETIC AGENT AROUND NERVE Bilateral 2021    Procedure: BLOCK, NERVE, SYMPATHIC;  Surgeon: Holden Pereira MD;  Location: [facility] PAIN MGT;  Service: Pain Management;  Laterality: Bilateral;    INJECTION OF ANESTHETIC AGENT AROUND NERVE N/A 2021    Procedure: BLOCK, NERVE, SYMPATHETIC  need consent;  Surgeon: Holden Pereira MD;  Location: [facility] PAIN MGT;  Service: Pain Management;  Laterality: N/A;    OK EVAL,SWALLOW FUNCTION,CINE/VIDEO RECORD  2021         TONSILLECTOMY      TRIAL OF SPINAL CORD NERVE STIMULATOR N/A 3/8/2023    Procedure: LUMBAR SPINAL CORD STIMULATOR TRIAL NEVRO REP PATIENT STATES SHE NO LONGER TAKES PLAVIX;  Surgeon: Holden Pereira MD;  Location: [facility] PAIN MGT;  Service: Pain Management;  Laterality: N/A;       Family History:  Family History   Problem Relation Age of Onset    COPD Mother     Lupus Mother     Hernia Mother     Uterine cancer Mother         vs cervical cancer    Ovarian cancer Mother     Diabetes Father     Coronary artery disease Father     Colon cancer  "Maternal Grandmother         in her 50's       Social History:  Social History     Socioeconomic History    Marital status:    Tobacco Use    Smoking status: Former     Types: Cigarettes     Quit date: 2020     Years since quittin.6     Passive exposure: Past    Smokeless tobacco: Never   Substance and Sexual Activity    Alcohol use: Not Currently     Comment: occasionally    Drug use: Yes     Types: Hydrocodone     Comment: three times a day    Sexual activity: Not Currently   Social History Narrative    Lives with daughter. .        OBJECTIVE:    /70   Pulse 93   Ht 5' 1" (1.549 m)   BMI 36.92 kg/m²     PHYSICAL EXAMINATION:     General appearance: Well appearing, in no acute distress, alert and oriented x3.  Psych:  Mood and affect appropriate.  Skin: Skin color, texture, turgor normal, no rashes or lesions, in both upper and lower body.  Head/face:  Atraumatic, normocephalic.  Pulm: Symmetric chest rise, no respiratory distress noted.   Musculoskeletal: 5/5 strength in right ankle with plantar and dorsiflexion. 5/5 strength in left ankle with plantar and dorsiflexion. 4/5 strength with right knee flexion and extension. 4/5 strength with left knee flexion and extension.   Neuro:  Decreased sensation to light touch to BLE.   Gait: Antalgic- ambulates with wheelchair.     ASSESSMENT: 78 y.o. year old female with chronic pain, consistent with:     1. Painful diabetic neuropathy        2. Polyneuropathy due to secondary diabetes        3. Radiculopathy of thoracolumbar region        4. Chronic pain disorder        5. Encounter for therapeutic drug monitoring  Pain Clinic Drug Screen                PLAN:     - Previous imaging reviewed.     - Schedule for Qutenza patch application.  She has had relief with this in the past.     - Continue Robaxin 500 mg TID PRN.    - Continue Lyrica 150 mg TID, refill provided.     - Continue Norco 10/325 mg TID PRN, #90, refill provided.     - The " patient is here today for a refill of current pain medications and they believe these provide effective pain control and improvements in quality of life.  The patient notes no serious side effects, and feels the benefits outweigh the risks.  The patient was reminded of the pain contract that they signed previously as well as the risks and benefits of the medication including possible death.  The updated [place] Board of [employer] prescription monitoring program was reviewed, and the patient has been found to be compliant with current treatment plan. Medication management provided by .     - UDS from 2/14/2022 reviewed. Repeat UDS today.     - RTC for above procedure.     The above plan and management options were discussed at length with patient. Patient is in agreement with the above and verbalized understanding.    Elvira Hylton NP  06/16/2023

## 2023-06-21 LAB
6MAM UR QL: NOT DETECTED
7AMINOCLONAZEPAM UR QL: NOT DETECTED
A-OH ALPRAZ UR QL: NOT DETECTED
ALPHA-OH-MIDAZOLAM: NOT DETECTED
ALPRAZ UR QL: NOT DETECTED
AMPHET UR QL SCN: NOT DETECTED
ANNOTATION COMMENT IMP: NORMAL
ANNOTATION COMMENT IMP: NORMAL
BARBITURATES UR QL: NOT DETECTED
BUPRENORPHINE UR QL: NOT DETECTED
BZE UR QL: NOT DETECTED
CARBOXYTHC UR QL: NOT DETECTED
CARISOPRODOL UR QL: NOT DETECTED
CLONAZEPAM UR QL: NOT DETECTED
CODEINE UR QL: NOT DETECTED
CREAT UR-MCNC: 21.1 MG/DL (ref 20–400)
DIAZEPAM UR QL: NOT DETECTED
ETHYL GLUCURONIDE UR QL: NOT DETECTED
FENTANYL UR QL: NOT DETECTED
GABAPENTIN: NOT DETECTED
HYDROCODONE UR QL: PRESENT
HYDROMORPHONE UR QL: PRESENT
LORAZEPAM UR QL: NOT DETECTED
MDA UR QL: NOT DETECTED
MDEA UR QL: NOT DETECTED
MDMA UR QL: NOT DETECTED
ME-PHENIDATE UR QL: NOT DETECTED
METHADONE UR QL: NOT DETECTED
METHAMPHET UR QL: NOT DETECTED
MIDAZOLAM UR QL SCN: NOT DETECTED
MORPHINE UR QL: NOT DETECTED
NALOXONE: NOT DETECTED
NORBUPRENORPHINE UR QL CFM: NOT DETECTED
NORDIAZEPAM UR QL: NOT DETECTED
NORFENTANYL UR QL: NOT DETECTED
NORHYDROCODONE UR QL CFM: PRESENT
NORMEPERIDINE UR QL CFM: NOT DETECTED
NOROXYCODONE UR QL CFM: NOT DETECTED
NOROXYMORPHONE UR QL SCN: NOT DETECTED
OXAZEPAM UR QL: NOT DETECTED
OXYCODONE UR QL: NOT DETECTED
OXYMORPHONE UR QL: NOT DETECTED
PATHOLOGY STUDY: NORMAL
PCP UR QL: NOT DETECTED
PHENTERMINE UR QL: NOT DETECTED
PREGABALIN: PRESENT
SERVICE CMNT-IMP: NORMAL
TAPENTADOL UR QL SCN: NOT DETECTED
TAPENTADOL UR QL SCN: NOT DETECTED
TEMAZEPAM UR QL: NOT DETECTED
TRAMADOL UR QL: NOT DETECTED
ZOLPIDEM METABOLITE: NOT DETECTED
ZOLPIDEM UR QL: NOT DETECTED

## 2023-06-26 ENCOUNTER — PATIENT MESSAGE (OUTPATIENT)
Dept: PHARMACY | Facility: CLINIC | Age: 78
End: 2023-06-26
Payer: MEDICARE

## 2023-06-26 DIAGNOSIS — I25.118 CORONARY ARTERY DISEASE OF NATIVE ARTERY OF NATIVE HEART WITH STABLE ANGINA PECTORIS: ICD-10-CM

## 2023-06-26 DIAGNOSIS — I25.84 CORONARY ARTERY CALCIFICATION: ICD-10-CM

## 2023-06-26 DIAGNOSIS — E78.01 FAMILIAL HYPERCHOLESTEROLEMIA: ICD-10-CM

## 2023-06-26 DIAGNOSIS — I25.10 CORONARY ARTERY CALCIFICATION: ICD-10-CM

## 2023-06-26 RX ORDER — EVOLOCUMAB 140 MG/ML
140 INJECTION, SOLUTION SUBCUTANEOUS
Qty: 8 EACH | Refills: 0 | Status: ACTIVE | OUTPATIENT
Start: 2023-06-26 | End: 2023-10-16 | Stop reason: SDUPTHER

## 2023-06-26 NOTE — TELEPHONE ENCOUNTER
Refill Encounter    PCP Visits: Recent Visits  Date Type Provider Dept   11/01/22 Office Visit Chun Zapata MD [facility]   09/30/22 Office Visit Chun Zapata MD [facility]   Showing recent visits within past 360 days and meeting all other requirements  Future Appointments  No visits were found meeting these conditions.  Showing future appointments within next 720 days and meeting all other requirements     Last 3 Blood Pressure:   BP Readings from Last 3 Encounters:   06/16/23 138/70   05/19/23 132/70   05/11/23 132/70     Preferred Pharmacy:   [facility]  [address]  [place] [address]  Phone: [phone] Fax: [phone]    [facility] #[id number] [address] AT [facility]  [address] [place] [address]  Phone: [phone] Fax: [phone]    [facility] [place], [place] - [address]  [address]  [facility] [address]  Phone: [phone] Fax: [phone]    [facility]  [address]  [place] [address]  Phone: [phone] Fax: [phone]    Requested RX:  Requested Prescriptions     Pending Prescriptions Disp Refills    evolocumab (REPATHA SURECLICK) 140 mg/mL PnIj 8 each 3     Sig: Inject 1 mL (140 mg total) into the skin every 14 (fourteen) days.      RX Route: Normal

## 2023-06-27 ENCOUNTER — OFFICE VISIT (OUTPATIENT)
Dept: PAIN MEDICINE | Facility: CLINIC | Age: 78
End: 2023-06-27
Payer: MEDICARE

## 2023-06-27 VITALS
HEIGHT: 61 IN | WEIGHT: 195.31 LBS | DIASTOLIC BLOOD PRESSURE: 71 MMHG | BODY MASS INDEX: 36.87 KG/M2 | SYSTOLIC BLOOD PRESSURE: 133 MMHG | HEART RATE: 82 BPM

## 2023-06-27 DIAGNOSIS — E13.42 POLYNEUROPATHY DUE TO SECONDARY DIABETES: ICD-10-CM

## 2023-06-27 DIAGNOSIS — E11.40 PAINFUL DIABETIC NEUROPATHY: Primary | ICD-10-CM

## 2023-06-27 PROCEDURE — 99999 PR PBB SHADOW E&M-EST. PATIENT-LVL IV: ICD-10-PCS | Mod: PBBFAC,,, | Performed by: NURSE PRACTITIONER

## 2023-06-27 PROCEDURE — 99499 UNLISTED E&M SERVICE: CPT | Mod: S$PBB,,, | Performed by: NURSE PRACTITIONER

## 2023-06-27 PROCEDURE — 64999 UNLISTED PX NERVOUS SYSTEM: CPT | Mod: S$PBB,,, | Performed by: NURSE PRACTITIONER

## 2023-06-27 PROCEDURE — 99499 NO LOS: ICD-10-PCS | Mod: S$PBB,,, | Performed by: NURSE PRACTITIONER

## 2023-06-27 PROCEDURE — 64999 UNLISTED PX NERVOUS SYSTEM: CPT | Mod: PBBFAC | Performed by: NURSE PRACTITIONER

## 2023-06-27 PROCEDURE — 99999 PR PBB SHADOW E&M-EST. PATIENT-LVL IV: CPT | Mod: PBBFAC,,, | Performed by: NURSE PRACTITIONER

## 2023-06-27 PROCEDURE — 64999 PR QUTENZA APPLICATION: ICD-10-PCS | Mod: S$PBB,,, | Performed by: NURSE PRACTITIONER

## 2023-06-27 PROCEDURE — 99214 OFFICE O/P EST MOD 30 MIN: CPT | Mod: PBBFAC | Performed by: NURSE PRACTITIONER

## 2023-06-27 RX ADMIN — CAPSAICIN 4 PATCH: KIT at 11:06

## 2023-06-27 NOTE — PROGRESS NOTES
Chronic patient Established Note (Follow up visit)    Interval History 6/27/2023:  The patient returns to clinic today for follow up of pain. She continues to report nerve pain to her feet bilaterally. This pain is worse with standing and walking. She is taking Lyrica and Robaxin. She also takes Norco as needed with benefit and without adverse effects. She denies any other health changes. Her pain today is 9/10.    Interval History 6/16/2023:  The patient returns to clinic today for follow up of back and leg pain. She continues to report low back pain. She also reports diabetic neuropathy to her bilateral feet. Her pain is worse with standing and walking. She has tried Qutenza patches with benefit in the past. She continues to take Robaxin and Lyrica. She also takes Norco as needed with benefit and without adverse effects. She denies any other health changes. Her pain today is 10/10.    Interval History 3/17/2023:  Mrs Waters presents for follow up of chronic, continuous neuropathic pain to [facility]. She is s/p Nevro SCS trial and unfortunately she did not get the relief she was hoping for and 50% at best relief but this was not consistent. She has no constitutional s/s concerning for infection and denies newer neurological changes since trial. She continues with medication mgt and states Qutenza application has been providing significant benefit.     Interval History 2/10/2023:  Mrs Waters presents for follow up of chronic lower back painn and radicular pain in conjunction with PDN to bilateral feet. She is doing fair with medication mgt of Norco, Robaxin, and Lyrica and does need refill at this time. She denies SE of medications. She is also here for Qutenza application today. She is scheduled to have upcoming SCS trial with [facility] address her PDN.     Interval History 12/13/2022:  Mrs Waters presents for follow up of chronic pain. She is ready to repeat Qutenza application as it has been >91 days and she had  significant improvement of symptoms of PDN. She recently had repeat A1C and just above 10.0 but is decreasing. She continues to take medication with benefit and denies SE of medication. Medication regimen include Norco 10/325mg TID, Robaxin 500mg and Lyrica 150mg.     Interval History 10/12/2022:  Mrs Waters presents for follow up of chronic neuropathy. She is s/p qutenza patch placement with improved functioning and neuropathy control. Unfortunately her A1C was above 11 which inhibits her from Nevro SCS trial at this time. She is eager to have SCS trial. She denies new areas of pain or neurological changes. She continued to take  Norco 10/325mg TID, Robaxin 500mg and Lyrica 150mg TID with benefit and denies significant SE of medications.     Interval History 9/8/2022:  Mrs Waters presents for follow up of chronic lower back painn and radicular pain in conjunction with PDN to bilateral feet. She is doing fair with medication mgt of Norco, Robaxin, and Lyrica and does need refill at this time. She denies SE of medications. She is patiently awaiting her A1C to become lower than 10 to proceed with SCS trial.     Interval History 8/12/2022:  Mrs Waters presents for delayed FU. She has had continuous pain to lumbar and radicular pain but also peripheral neuropathy complaints. Over interval she has had CVA but doing fair. Her A1C has unfortunately elevated above 10 at this time and SCS trial placed on hold but phychiatric evaluation complete. She continues to take Norco 10/325mg TID, Robaxin 500mg TID and Lyrica 150mg TID with some benefit and denies SE of medications. No s/s concerning for cauda equina.     Interval History 4/12/2022:  Patient returns to clinic today for follow-up. Her neuropathic pain continues to be an issue for her, but she says that she is able to manage. She is taking Norco 10-325mg TID, Robaxin 500mg TID, and Lyrica 150mg TID without any adverse side effects. She denies any changes in her symptoms.  She would like to proceed with SCS trial. Discussed last time that her HbA1c should be <10, was 9.8 on most recent labs. Seen by psychology and cleared for SCS.     Interval History 2/14/2022:  Mrs Waters presents for follow up. Pt states overall neuropathy continues. Since last visit she has been stable with medication mgt and takign Norco 10/325mg TID, Robaxin 500mg TID and Lyrica 150mg TID. She denies new areas of pain or neurological changes. Medication adequate to control pain without adverse SE.      Interval History 12/21/2021:  Pt presents for urgent evaluation s/p fall in kitchen last night. Pt unsure of LOC or head trauma but states some amnesia of events. Pt is having left knee pain, B ankle pain L>R and lower back/buttock pain. Daughter further states tremor like activity last night. During visit daughter asked for bedside commode due to inability to ambulate.  Pt during visit appeared somnolent, pale and began vomiting. Discussed going to ER for further evaluation.      Interval History 12/14/2021:  Mrs aWters presents for follow up of chronic pain complaints. She is hopeful she will have A1C lower soon to move forward with SCS. She is doing fair with medication mgt alone at this time and denies SE of medications. Pt is currently taking Norco 10/325mg TID PRN, Lyrica 150mg TID, and Robaxin 500mg TID. She denies new areas of pain or neurological changes.      Interval History 10/14/2021:  The Pt presents for follow up and s/p B Lumbar sympathetic blocks. Pt states this has done well but ready to proceed with SCS trial. She is aware A1C must be under tight control and Pt and daughter re-iterate knowing this. Pt also mentions DKA admission and diagnosis of vasculitis as well over interval. Pt continues to take hydrocodone 10/325 mg TID PRN, Lyrica 150 mg TID, and Robaxin 500mg TID. She denies any SE of medication, denies new neurological changes.      Interval Hx 09/16/2021  Indira Waters presents to  the clinic for a follow-up appointment for f/u after bilateral lumbar sympathetic block on 8/25/21.Patient reports >50% relief after lumbar sympathetic block that lasted 2 weeks when she then developed a UTI/DKA, was admitted to the hospital and pain recurred.  Current pain intensity is 8/10.     Interval History 8/12/2021:  Indira Waters presents to the clinic for a follow-up appointment for BLE diabetic neuropathy, painful. Continues with Norco 10/325mg, Lyrica 150mg TID, Robaxin 750mg daily PRN. She had to cancel previously scheduled lumbar sympathetic block 2/2 lithotripsy for painful kidney stones, which have now passed. She still has intermittent pain with urination but overall that pain is improving. She had to cancel previous angiogram for this same reason - this has not been rescheduled yet. She denies any change in her LE pain. Denies any new neurological sxs in BLEs.     Interval History 6/10/21:  Indira Waters presents to the clinic for a 2 week follow-up appointment from lumbar sympathetic nerve block in early May. She reports minimal relief from this intervention, but did note that it helped some, briefly. Since the last visit, Indira Waters states the pain has been persistant. Current pain intensity is 10/10. Patient has chronic generalized diffuse pain, most pronounced in her BL lower extremities 2/2 diabetic neuropathy. HSe states that the pain is a little better than at her last visit. Most days are 10/10, but some days are better than others. Her pain is aggravated by exertion, walking, or sitting/standing for extended periods of time. He pain is mildly improved by rest and medications. She is currently taking Lyrica 150 PO TID, Zoloft, and Norco 10-325mg TID PRN. She states that the pain meds are helping but she is still constantly in pain and unable to participate in her ADLs. She has now established care with PCP for assistance w/ poorly controlled T2DM, last A1c 11.4. She has not yet  "established care w/ Rheumatology for fibromyalgia management.    Interval HPI 4/15/21:  Patient returns for follow up of chronic generalized diffuse pain. At the last visit, had EMG (not yet completed), lumbar and hip x-ray imaging ordered. She was also referred to Rheumatology for fibromyalgia management and referral to PCP for DM2 management and weaning of zoloft for transition to cymbalta for treatment of neuropathy. She had her Lyrica increased to 150mg PO TID, and prescribed Norco 10mg TID with opioid contract signed. She has been taking Norco 10mg TID and it has been helping her "bad" pains but does not take all of her pain away. She has not yet been set up with her new PCP or rheumatologist yet for fibromyalgia. Still continues to have generalized pain everywhere including feet, hips, legs, lower and upper back, neck and shoulder pain. Continues to have neuropathy in the bilateral lower extremities due to uncontrolled DM2.    Initial Visit 3/11/21:  Indira Waters presents to the clinic for the evaluation of chronic pain. Complaining of pain everywhere including feet, legs, hips, lower and upper back, neck, shoulder. Pain started 5+ years ago. Pain 10/10 at worse. She was referred here by her PCP Dr. Ramos who she has been seeing for over 6 years. She states her pain is aggravated by any physical activity/movement. She takes lyrica, robaxin, and Norco 10 TID with mild relief of pain. Per patient, she was referred here by her PCP for medication refill. She has not tried physical therapy for several years, she states it did not help last time she was in PT. She says she is trying to exercise daily by doing yoga. She walks with a walker. She has numerous comorbidities including DM2 (Last A1C 9+ per prior family medicine note in Jan 2021), fibromyalgia, lupus, DDD. She states she would like to find a new PCP as she no longer lives near her old one. Patient denies night fever/night sweats, urinary incontinence, " bowel incontinence and significant weight loss.      Pain Disability Index Review:  Last 3 PDI Scores 6/16/2023 3/16/2023 2/10/2023   Pain Disability Index (PDI) 41 63 50     Pain Medications:  Norco 10-325mg TID, Robaxin 500mg TID, and Lyrica 150mg TID    Opioid Contract: yes     report:  Reviewed and consistent with medication use as prescribed.    Pain Procedures:    - reports possible back injection 10+ years ago  - Lumbar sympathetic nerve block 05/05/21 - Dr. Pereira - minimal relief    Physical Therapy/Home Exercise: no     Imaging:   Hip X-ray 4/7/21  FINDINGS:  Osseous structures appear intact without evidence of fracture or osseous destructive process.  No apparent dislocation.     Modest degenerative change or significant joint space narrowing.     Lumbar X-ray 4/7/21  FINDINGS:  Diffuse bony demineralization.  Vertebral bodies are normal in height without evidence of fracture.  No pars defects.     Normal sagittal alignment is preserved.  No spondylolisthesis. No abnormal translation with flexion and extension.     Intervertebral disc heights are well maintained.  Mild facet arthropathy in the lower lumbar spine.     Mild scattered vascular calcification.     Impression:     No evidence of fracture or malalignment.     Mild facet arthropathy in the lower lumbar spine.     Diffuse bony demineralization.  Consider correlation with DEXA.    Allergies:   Review of patient's allergies indicates:   Allergen Reactions    Bleach (sodium hypochlorite) Shortness Of Breath    Nitrofurantoin macrocrystalline Anaphylaxis    Lipitor [atorvastatin] Diarrhea and Rash    Nsaids (non-steroidal anti-inflammatory drug)     Pcn [penicillins]     Toradol [ketorolac]        Current Medications:   Current Outpatient Medications   Medication Sig Dispense Refill    acetaminophen (TYLENOL) 500 MG tablet Take 2 tablets (1,000 mg total) by mouth every 8 (eight) hours as needed for Pain.  0    allopurinoL (ZYLOPRIM) 300 MG tablet  Take 1 tablet (300 mg total) by mouth once daily. 90 tablet 1    aspirin (ECOTRIN) 81 MG EC tablet Take 1 tablet (81 mg total) by mouth once daily. 30 tablet 0    blood sugar diagnostic Strp 1 each by Misc.(Non-Drug; Combo Route) route 4 (four) times daily. 200 each 0    blood-glucose meter Misc Follow package directions (Patient taking differently: Follow package directions) 1 each 0    cloNIDine (CATAPRES) 0.1 MG tablet TAKE ONE TABLET BY MOUTH TWICE DAILY 180 tablet 0    evolocumab (REPATHA SURECLICK) 140 mg/mL PnIj Inject 1 mL (140 mg total) into the skin every 14 (fourteen) days. 8 each 0    fenofibrate micronized (LOFIBRA) 134 MG Cap Take 1 capsule (134 mg total) by mouth daily with breakfast. 90 capsule 3    furosemide (LASIX) 40 MG tablet Take 1 tablet (40 mg total) by mouth once daily. 30 tablet 6    HYDROcodone-acetaminophen (NORCO)  mg per tablet Take 1 tablet by mouth every 8 (eight) hours as needed for Pain. 90 tablet 0    hydrocortisone (ANUSOL-HC) 2.5 % rectal cream Procto-Med HC 2.5 % topical cream perineal applicator      insulin aspart U-100 (NOVOLOG) 100 unit/mL (3 mL) InPn pen Inject 12 Units into the skin 3 (three) times daily with meals. Plus correction scale. Max TDD 40units. 15 mL 3    insulin detemir U-100, Levemir, (LEVEMIR FLEXPEN) 100 unit/mL (3 mL) InPn pen INJECT 20 UNITS into THE SKIN TWICE DAILY 15 mL 11    insulin detemir U-100, Levemir, (LEVEMIR FLEXTOUCH U-100 INSULN) 100 unit/mL (3 mL) InPn pen Inject 20 Units into the skin 2 (two) times daily. 15 mL 3    ipratropium-albuteroL (COMBIVENT)  mcg/actuation inhaler Inhale 1 puff into the lungs by mouth every 6 (six) hours. 4 g 0    metoclopramide HCl (REGLAN) 5 MG tablet Take 1 tablet (5 mg total) by mouth 4 (four) times daily as needed (nausea, prevention). 30 tablet 3    metoprolol succinate (TOPROL-XL) 100 MG 24 hr tablet Take 1 tablet (100 mg total) by mouth once daily. 90 tablet 3    nitroGLYCERIN (NITROSTAT) 0.4 MG  "SL tablet place 1 TABLET UNDER THE TONGUE every five minutes AS NEEDED FOR CHEST pain 25 tablet 0    pantoprazole (PROTONIX) 40 MG tablet Take 1 tablet (40 mg total) by mouth once daily. 30 tablet 0    pen needle, diabetic (BD ULTRA-FINE HIEN PEN NEEDLE) 32 gauge x 5/32" Ndle 1 each by Misc.(Non-Drug; Combo Route) route 4 (four) times daily. 400 each 3    pregabalin (LYRICA) 150 MG capsule Take 1 capsule (150 mg total) by mouth 3 (three) times daily. 90 capsule 2    senna-docusate 8.6-50 mg (PERICOLACE) 8.6-50 mg per tablet Take 1 tablet by mouth 2 (two) times daily as needed for Constipation. 60 tablet 0    sertraline (ZOLOFT) 100 MG tablet Take 1 tablet (100 mg total) by mouth once daily. 90 tablet 3    sulfamethoxazole-trimethoprim 800-160mg (BACTRIM DS) 800-160 mg Tab sulfamethoxazole 800 mg-trimethoprim 160 mg tablet      teriparatide 20 mcg/dose (620mcg/2.48mL) PnIj Inject 20 mcg into the skin once daily. 2.48 mL 11    triamcinolone acetonide 0.1% (KENALOG) 0.1 % cream Apply to affected areas of body twice daily as needed rash. Do not use on face, underarms, or groin. 454 g 0    TRUEPLUS LANCETS 30 gauge Misc TEST AS DIRECTED FOUR TIMES DAILY 200 each 2    blood-glucose meter,continuous (DEXCOM G6 ) Misc 1 each by Misc.(Non-Drug; Combo Route) route continuous prn. 1 each PRN    blood-glucose sensor (DEXCOM G6 SENSOR) Nicole 3 each by Misc.(Non-Drug; Combo Route) route continuous prn. Change every 10 days. 3 each PRN    blood-glucose transmitter (DEXCOM G6 TRANSMITTER) Nicole 1 each by Misc.(Non-Drug; Combo Route) route continuous prn. 1 each PRN    losartan (COZAAR) 50 MG tablet Take 1 tablet (50 mg total) by mouth once daily. 90 tablet 0    NIFEdipine (PROCARDIA-XL) 30 MG (OSM) 24 hr tablet Take 1 tablet (30 mg total) by mouth 2 (two) times a day. 180 tablet 0     No current facility-administered medications for this visit.       REVIEW OF SYSTEMS:    GENERAL:  No weight loss, malaise or fevers.  HEENT:  " Negative for frequent or significant headaches.  NECK:  Negative for lumps, goiter, pain and significant neck swelling.  RESPIRATORY:  Negative for cough, wheezing or shortness of breath.  CARDIOVASCULAR:  Negative for chest pain, leg swelling or palpitations.  GI:  Negative for abdominal discomfort, blood in stools or black stools or change in bowel habits.  MUSCULOSKELETAL:  See HPI.  SKIN:  Negative for lesions, rash, and itching.  PSYCH:  Negative for sleep disturbance, mood disorder and recent psychosocial stressors.  HEMATOLOGY/LYMPHOLOGY:  Negative for prolonged bleeding, bruising easily or swollen nodes.  NEURO:   No history of headaches, syncope, paralysis, seizures or tremors.  All other reviewed and negative other than HPI.    Past Medical History:  Past Medical History:   Diagnosis Date    Arthritis     Back pain     Cancer     ovarian    Cervical cancer     Depression     Diabetes mellitus     Fibromyalgia     Heart attack     Hyperlipidemia     Hypertension     Lupus     Stroke     slight left sided weakness       Past Surgical History:  Past Surgical History:   Procedure Laterality Date    APPENDECTOMY       SECTION      2    CORONARY ANGIOGRAPHY N/A 2022    Procedure: ANGIOGRAM, CORONARY ARTERY;  Surgeon: Jose L Méndez MD;  Location: [facility] CATH LAB;  Service: Cardiology;  Laterality: N/A;    HYSTERECTOMY      with USO for cervical cancer    INJECTION OF ANESTHETIC AGENT AROUND NERVE Bilateral 2021    Procedure: BLOCK, NERVE, SYMPATHIC;  Surgeon: Holden Pereira MD;  Location: [facility] PAIN [facility];  Service: Pain Management;  Laterality: Bilateral;    INJECTION OF ANESTHETIC AGENT AROUND NERVE N/A 2021    Procedure: BLOCK, NERVE, SYMPATHETIC  need consent;  Surgeon: Holden Pereira MD;  Location: [facility] PAIN MGT;  Service: Pain Management;  Laterality: N/A;    YARED LINK,SWALLOW FUNCTION,CINE/VIDEO RECORD  2021         TONSILLECTOMY      TRIAL OF SPINAL CORD NERVE STIMULATOR  "N/A 3/8/2023    Procedure: LUMBAR SPINAL CORD STIMULATOR TRIAL NEVRO REP PATIENT STATES SHE NO LONGER TAKES PLAVIX;  Surgeon: Holden Pereira MD;  Location: [facility];  Service: Pain Management;  Laterality: N/A;       Family History:  Family History   Problem Relation Age of Onset    COPD Mother     Lupus Mother     Hernia Mother     Uterine cancer Mother         vs cervical cancer    Ovarian cancer Mother     Diabetes Father     Coronary artery disease Father     Colon cancer Maternal Grandmother         in her 50's       Social History:  Social History     Socioeconomic History    Marital status:    Tobacco Use    Smoking status: Former     Types: Cigarettes     Quit date: 2020     Years since quittin.6     Passive exposure: Past    Smokeless tobacco: Never   Substance and Sexual Activity    Alcohol use: Not Currently     Comment: occasionally    Drug use: Yes     Types: Hydrocodone     Comment: three times a day    Sexual activity: Not Currently   Social History Narrative    Lives with daughter. .        OBJECTIVE:    /71 (BP Location: Right arm, Patient Position: Sitting, BP Method: Medium (Automatic))   Pulse 82   Ht 5' 1" (1.549 m)   Wt 88.6 kg (195 lb 5.2 oz)   BMI 36.91 kg/m²     PHYSICAL EXAMINATION:     General appearance: Well appearing, in no acute distress, alert and oriented x3.  Psych:  Mood and affect appropriate.  Skin: Skin color, texture, turgor normal, no rashes or lesions, in both upper and lower body.  Head/face:  Atraumatic, normocephalic.  Pulm: Symmetric chest rise, no respiratory distress noted.   Musculoskeletal: 5/5 strength in right ankle with plantar and dorsiflexion. 5/5 strength in left ankle with plantar and dorsiflexion. 4/5 strength with right knee flexion and extension. 4/5 strength with left knee flexion and extension.   Neuro:  Decreased sensation to light touch to BLE.   Gait: Antalgic- ambulates with wheelchair.     ASSESSMENT: 78 y.o. " year old female with chronic pain, consistent with:     1. Painful diabetic neuropathy        2. Polyneuropathy due to secondary diabetes                    PLAN:     - Previous imaging reviewed.     - Qutenza patch application as per below.      - Continue Robaxin 500 mg TID PRN.    - Continue Lyrica 150 mg TID.     - Continue Norco 10/325 mg TID PRN, #90, refill already sent.      - The patient is here today for a refill of current pain medications and they believe these provide effective pain control and improvements in quality of life.  The patient notes no serious side effects, and feels the benefits outweigh the risks.  The patient was reminded of the pain contract that they signed previously as well as the risks and benefits of the medication including possible death.  The updated [place] Board  Pharmacy prescription monitoring program was reviewed, and the patient has been found to be compliant with current treatment plan. Medication management provided by .     - UDS from 6/16/2023 reviewed and consistent.     - RTC in 3 months for medication management.     The above plan and management options were discussed at length with patient. Patient is in agreement with the above and verbalized understanding.    Elvira Hylton NP  06/27/2023    PATIENT NAME: Indira Waters      MRN: [id number]      Diagnosis: Painful Diabetic Neuropathy E13.42     Postprocedural Diagnosis: Same     Complications: None     Informed Consent:  The patient's condition and proposed procedures, risks (including complications of nerve damage, skin irritation, infection, and failure of pain relief), and alternatives were discussed with the patient or responsible party.  The patient's/responsible party's questions were answered.  The patient/responsible party appeared to understand and chose to proceed.       Procedural Pause:  A procedural pause verifying correct patient, medical record number, allergies, medications to  be administered, current vital signs, and proper application site was performed immediately prior to beginning the procedure.     Procedure in Detail:  The patient was placed in a supine position. 4 patches were used for the procedure today.  The patches were applied to the target area and secured with tape.  A coban was used to further secure the patches in place.  The patient was allowed to rest in a comfortable position, and monitored by staff every 10-15 minutes to ensure comfort. The patches remained in place for 30 minutes.  The patches were then removed and the cleaning gel was applied and allowed to sit for an additional 60 seconds, after which the area was wiped clean.      The patient was then discharged in stable condition.        DISCUSSION:  A Qutenza patch was applied today with the purpose of treating the patients nerve pain. They were informed that they may have some burning of the skin for a few days, and should not take a hot shower for 2-3 days as that may irritate the area.  They were informed that the area may feel like they had a sunburn. They were informed that it can take about 2-4 weeks for the effects of the patch to be fully realized.    I will see the patient for follow up in 1 month.    Plan to repeat every 91 days.      Elvira Hylton NP  06/27/2023

## 2023-07-06 ENCOUNTER — SPECIALTY PHARMACY (OUTPATIENT)
Dept: PHARMACY | Facility: CLINIC | Age: 78
End: 2023-07-06
Payer: MEDICARE

## 2023-07-06 NOTE — TELEPHONE ENCOUNTER
Specialty Pharmacy - Refill Coordination    Specialty Medication Orders Linked to Encounter      Flowsheet Row Most Recent Value   Medication #1 evolocumab (REPATHA SURECLICK) 140 mg/mL PnIj (Order#[id number], Rx#[id number])            Refill Questions - Documented Responses      Flowsheet Row Most Recent Value   Patient Availability and HIPAA Verification    Does patient want to proceed with activity? Yes   HIPAA/medical authority confirmed? Yes   Relationship to patient of person spoken to? Child   Refill Screening Questions    Would patient like to speak to a pharmacist? No   When does the patient need to receive the medication? 07/11/23   Refill Delivery Questions    How will the patient receive the medication? MEDRx   When does the patient need to receive the medication? 07/11/23   Shipping Address Home   Address in [facility] confirmed and updated if neccessary? Yes   Expected Copay ($) 0   Is the patient able to afford the medication copay? Yes   Payment Method zero copay   Days supply of Refill 28   Supplies needed? No supplies needed   Refill activity completed? Yes   Refill activity plan Refill scheduled   Shipment/Pickup Date: 07/10/23            Current Outpatient Medications   Medication Sig    acetaminophen (TYLENOL) 500 MG tablet Take 2 tablets (1,000 mg total) by mouth every 8 (eight) hours as needed for Pain.    allopurinoL (ZYLOPRIM) 300 MG tablet Take 1 tablet (300 mg total) by mouth once daily.    aspirin (ECOTRIN) 81 MG EC tablet Take 1 tablet (81 mg total) by mouth once daily.    blood sugar diagnostic Strp 1 each by Misc.(Non-Drug; Combo Route) route 4 (four) times daily.    blood-glucose meter Misc Follow package directions (Patient taking differently: Follow package directions)    blood-glucose meter,continuous (DEXCOM G6 ) Misc 1 each by Misc.(Non-Drug; Combo Route) route continuous prn.    blood-glucose sensor (DEXCOM G6 SENSOR) Nicole 3 each by Misc.(Non-Drug; Combo Route)  route continuous prn. Change every 10 days.    blood-glucose transmitter (DEXCOM G6 TRANSMITTER) Nicole 1 each by Misc.(Non-Drug; Combo Route) route continuous prn.    cloNIDine (CATAPRES) 0.1 MG tablet TAKE ONE TABLET BY MOUTH TWICE DAILY    evolocumab (REPATHA SURECLICK) 140 mg/mL PnIj Inject 1 mL (140 mg total) into the skin every 14 (fourteen) days.    fenofibrate micronized (LOFIBRA) 134 MG Cap Take 1 capsule (134 mg total) by mouth daily with breakfast.    furosemide (LASIX) 40 MG tablet Take 1 tablet (40 mg total) by mouth once daily.    HYDROcodone-acetaminophen (NORCO)  mg per tablet Take 1 tablet by mouth every 8 (eight) hours as needed for Pain.    hydrocortisone (ANUSOL-HC) 2.5 % rectal cream Procto-Med HC 2.5 % topical cream perineal applicator    insulin aspart U-100 (NOVOLOG) 100 unit/mL (3 mL) InPn pen Inject 12 Units into the skin 3 (three) times daily with meals. Plus correction scale. Max TDD 40units.    insulin detemir U-100, Levemir, (LEVEMIR FLEXPEN) 100 unit/mL (3 mL) InPn pen INJECT 20 UNITS into THE SKIN TWICE DAILY    insulin detemir U-100, Levemir, (LEVEMIR FLEXTOUCH U-100 INSULN) 100 unit/mL (3 mL) InPn pen Inject 20 Units into the skin 2 (two) times daily.    ipratropium-albuteroL (COMBIVENT)  mcg/actuation inhaler Inhale 1 puff into the lungs by mouth every 6 (six) hours.    losartan (COZAAR) 50 MG tablet Take 1 tablet (50 mg total) by mouth once daily.    metoclopramide HCl (REGLAN) 5 MG tablet Take 1 tablet (5 mg total) by mouth 4 (four) times daily as needed (nausea, prevention).    metoprolol succinate (TOPROL-XL) 100 MG 24 hr tablet Take 1 tablet (100 mg total) by mouth once daily.    NIFEdipine (PROCARDIA-XL) 30 MG (OSM) 24 hr tablet Take 1 tablet (30 mg total) by mouth 2 (two) times a day.    nitroGLYCERIN (NITROSTAT) 0.4 MG SL tablet place 1 TABLET UNDER THE TONGUE every five minutes AS NEEDED FOR CHEST pain    pantoprazole (PROTONIX) 40 MG tablet Take 1 tablet (40 mg  "total) by mouth once daily.    pen needle, diabetic (BD ULTRA-FINE HIEN PEN NEEDLE) 32 gauge x 5/32" Ndle 1 each by Misc.(Non-Drug; Combo Route) route 4 (four) times daily.    pregabalin (LYRICA) 150 MG capsule Take 1 capsule (150 mg total) by mouth 3 (three) times daily.    senna-docusate 8.6-50 mg (PERICOLACE) 8.6-50 mg per tablet Take 1 tablet by mouth 2 (two) times daily as needed for Constipation.    sertraline (ZOLOFT) 100 MG tablet Take 1 tablet (100 mg total) by mouth once daily.    sulfamethoxazole-trimethoprim 800-160mg (BACTRIM DS) 800-160 mg Tab sulfamethoxazole 800 mg-trimethoprim 160 mg tablet    teriparatide 20 mcg/dose (620mcg/2.48mL) PnIj Inject 20 mcg into the skin once daily.    triamcinolone acetonide 0.1% (KENALOG) 0.1 % cream Apply to affected areas of body twice daily as needed rash. Do not use on face, underarms, or groin.    TRUEPLUS LANCETS 30 gauge Misc TEST AS DIRECTED FOUR TIMES DAILY   Last reviewed on 6/27/2023 10:57 AM by Elvira Hylton NP    Review of patient's allergies indicates:   Allergen Reactions    Bleach (sodium hypochlorite) Shortness Of Breath    Nitrofurantoin macrocrystalline Anaphylaxis    Lipitor [atorvastatin] Diarrhea and Rash    Nsaids (non-steroidal anti-inflammatory drug)     Pcn [penicillins]     Toradol [ketorolac]     Last reviewed on  6/27/2023 10:57 AM by Elvira Hylton      Tasks added this encounter   No tasks added.   Tasks due within next 3 months   7/6/2023 - Refill Coordination Outreach (1 time occurrence)     Karon Mcbride, [facility]  [address] [place] [address]  Phone: [phone]  Fax: [phone]        "

## 2023-07-07 DIAGNOSIS — K31.84 GASTROPARESIS: ICD-10-CM

## 2023-07-07 RX ORDER — METOCLOPRAMIDE 5 MG/1
5 TABLET ORAL 4 TIMES DAILY PRN
Qty: 30 TABLET | Refills: 3 | Status: SHIPPED | OUTPATIENT
Start: 2023-07-07 | End: 2023-08-09 | Stop reason: SDUPTHER

## 2023-07-07 NOTE — TELEPHONE ENCOUNTER
Care Due:                  Date            Visit Type   Department     Provider  --------------------------------------------------------------------------------                                ESTABLISHED                              PATIENT -    [facility] INTERNAL  Last Visit: 11-      [email or web address]      MEDICINE       Chun Zapata  Next Visit: None Scheduled  None         None Found                                                            Last  Test          Frequency    Reason                     Performed    Due Date  --------------------------------------------------------------------------------    Lipid Panel.  12 months..  fenofibrate..............  09- 09-    [facility] Embedded Care Due Messages. Reference number: [id number].   7/07/2023 4:46:37 PM CDT

## 2023-07-12 DIAGNOSIS — G89.4 CHRONIC PAIN SYNDROME: ICD-10-CM

## 2023-07-12 DIAGNOSIS — G89.4 CHRONIC PAIN DISORDER: ICD-10-CM

## 2023-07-12 DIAGNOSIS — E08.42 DIABETIC POLYNEUROPATHY ASSOCIATED WITH DIABETES MELLITUS DUE TO UNDERLYING CONDITION: ICD-10-CM

## 2023-07-13 RX ORDER — HYDROCODONE BITARTRATE AND ACETAMINOPHEN 10; 325 MG/1; MG/1
1 TABLET ORAL EVERY 8 HOURS PRN
Qty: 90 TABLET | Refills: 0 | Status: SHIPPED | OUTPATIENT
Start: 2023-07-15 | End: 2023-08-15 | Stop reason: SDUPTHER

## 2023-07-13 NOTE — TELEPHONE ENCOUNTER
Staff contacted patient about call back message. Patient wanted to check on the status of her hydrocodone prescription. Staff told patient that it is due to get filled tomorrow. Patient verbally understood.

## 2023-07-13 NOTE — TELEPHONE ENCOUNTER
----- Message from Roosevelt Pantoja sent at 7/13/2023 10:05 AM CDT -----  Name of Who is Calling:CAYLA GOODEN [[id number]]           What is the request in detail:pt stated that he would like to speak with the office directly in regards to his questions/concerns.Please contact to further discuss and advise.           Can the clinic reply by [facility]:[phone]           What [facility][phone]

## 2023-07-13 NOTE — TELEPHONE ENCOUNTER
Patient requesting refill on Norco 10/325mg  Last office visit 06.27.23   shows last refill on 06.16.23  Patient does have a pain contract on file with [facility]  Patient last UDS 06.16.23 was consistent with current therapy

## 2023-07-14 ENCOUNTER — SPECIALTY PHARMACY (OUTPATIENT)
Dept: PHARMACY | Facility: CLINIC | Age: 78
End: 2023-07-14
Payer: MEDICARE

## 2023-07-14 DIAGNOSIS — M81.0 AGE-RELATED OSTEOPOROSIS WITHOUT CURRENT PATHOLOGICAL FRACTURE: Primary | ICD-10-CM

## 2023-07-14 NOTE — TELEPHONE ENCOUNTER
Specialty Pharmacy - Clinical Reassessment    Specialty Medication Orders Linked to Encounter      Flowsheet Row Most Recent Value   Medication #1 teriparatide 20 mcg/dose (620mcg/2.48mL) PnIj (Order#[id number], Rx#[id number])          Patient Diagnosis   M81.0 - Age-related osteoporosis without current pathological fracture    Indira Waters is a 78 y.o. female, who is followed by the specialty pharmacy service for management and education of her Osteoporosis.  She has been on therapy with Teriparatide for 9 months.  I have reviewed her electronic medical record and current medication list and determined that specialty medication adjustment Is not needed at this time.    Patient has not experienced adverse events.  She Is adherent reporting 0 missed doses since last review.  Adherence has been encouraged with the following mechanism(s): proactive refill calls.  She is meeting goals of therapy and will continue treatment.        7/6/2023 6/13/2023 6/2/2023 5/19/2023 5/9/2023 4/21/2023 4/3/2023   Follow Up Review   # of missed doses  0  0  0    New Medications?  No  No  No    New Conditions?  No  No  No    New Allergies?  No  No  No    Med Effective?  Good  Good  Good    Urgent Care?  No  No  No    Requested Pharmacist? No No No No No No No         Therapy is appropriate to continue.    Therapy is effective: Yes  On scale of 1 to 10, how does patient rank quality of life? (10 - Best): Unable to Assess  Recommendations: none at this time.  Review Method: Chart Review    Tasks added this encounter   No tasks added.   Tasks due within next 3 months   7/10/2023 - Clinical Assessment (1 year recurrence)  7/14/2023 - Refill Coordination Outreach (1 time occurrence)     Goldy Rodriguez, [facility]  [address]  [facility] [address]  Phone: [phone]  Fax: [phone]

## 2023-07-20 ENCOUNTER — SPECIALTY PHARMACY (OUTPATIENT)
Dept: PHARMACY | Facility: CLINIC | Age: 78
End: 2023-07-20
Payer: MEDICARE

## 2023-07-20 NOTE — TELEPHONE ENCOUNTER
Specialty Pharmacy - Refill Coordination    Specialty Medication Orders Linked to Encounter      Flowsheet Row Most Recent Value   Medication #1 teriparatide 20 mcg/dose (620mcg/2.48mL) PnIj (Order#[id number], Rx#[id number])            Refill Questions - Documented Responses      Flowsheet Row Most Recent Value   Patient Availability and HIPAA Verification    Does patient want to proceed with activity? Yes   HIPAA/medical authority confirmed? Yes   Relationship to patient of person spoken to? Child   Refill Screening Questions    Changes to allergies? No   Changes to medications? No   New conditions since last clinic visit? No   Unplanned office visit, urgent care, ED, or hospital admission in the last 4 weeks? No   How does patient/caregiver feel medication is working? Good   Financial problems or insurance changes? No   How many doses of your specialty medications were missed in the last 4 weeks? 0   Would patient like to speak to a pharmacist? No   When does the patient need to receive the medication? 07/21/23   Refill Delivery Questions    How will the patient receive the medication? MEDRx   When does the patient need to receive the medication? 07/21/23   Shipping Address Home   Address in [facility] confirmed and updated if neccessary? Yes   Expected Copay ($) 0   Is the patient able to afford the medication copay? Yes   Payment Method zero copay   Days supply of Refill 28   Supplies needed? No supplies needed   Refill activity completed? Yes   Refill activity plan Refill scheduled   Shipment/Pickup Date: 07/21/23            Current Outpatient Medications   Medication Sig    acetaminophen (TYLENOL) 500 MG tablet Take 2 tablets (1,000 mg total) by mouth every 8 (eight) hours as needed for Pain.    allopurinoL (ZYLOPRIM) 300 MG tablet Take 1 tablet (300 mg total) by mouth once daily.    aspirin (ECOTRIN) 81 MG EC tablet Take 1 tablet (81 mg total) by mouth once daily.    blood sugar diagnostic Strp 1 each  by Misc.(Non-Drug; Combo Route) route 4 (four) times daily.    blood-glucose meter Misc Follow package directions (Patient taking differently: Follow package directions)    blood-glucose meter,continuous (DEXCOM G6 ) Misc 1 each by Misc.(Non-Drug; Combo Route) route continuous prn.    blood-glucose sensor (DEXCOM G6 SENSOR) Nicole 3 each by Misc.(Non-Drug; Combo Route) route continuous prn. Change every 10 days.    blood-glucose transmitter (DEXCOM G6 TRANSMITTER) Nicole 1 each by Misc.(Non-Drug; Combo Route) route continuous prn.    cloNIDine (CATAPRES) 0.1 MG tablet TAKE ONE TABLET BY MOUTH TWICE DAILY    evolocumab (REPATHA SURECLICK) 140 mg/mL PnIj Inject 1 mL (140 mg total) into the skin every 14 (fourteen) days.    fenofibrate micronized (LOFIBRA) 134 MG Cap Take 1 capsule (134 mg total) by mouth daily with breakfast.    furosemide (LASIX) 40 MG tablet Take 1 tablet (40 mg total) by mouth once daily.    HYDROcodone-acetaminophen (NORCO)  mg per tablet Take 1 tablet by mouth every 8 (eight) hours as needed for Pain.    hydrocortisone (ANUSOL-HC) 2.5 % rectal cream Procto-Med HC 2.5 % topical cream perineal applicator    insulin aspart U-100 (NOVOLOG) 100 unit/mL (3 mL) InPn pen Inject 12 Units into the skin 3 (three) times daily with meals. Plus correction scale. Max TDD 40units.    insulin detemir U-100, Levemir, (LEVEMIR FLEXPEN) 100 unit/mL (3 mL) InPn pen INJECT 20 UNITS into THE SKIN TWICE DAILY    insulin detemir U-100, Levemir, (LEVEMIR FLEXTOUCH U-100 INSULN) 100 unit/mL (3 mL) InPn pen Inject 20 Units into the skin 2 (two) times daily.    ipratropium-albuteroL (COMBIVENT)  mcg/actuation inhaler Inhale 1 puff into the lungs by mouth every 6 (six) hours.    losartan (COZAAR) 50 MG tablet Take 1 tablet (50 mg total) by mouth once daily.    metoclopramide HCl (REGLAN) 5 MG tablet Take 1 tablet (5 mg total) by mouth 4 (four) times daily as needed (nausea, prevention).    metoprolol succinate  "(TOPROL-XL) 100 MG 24 hr tablet Take 1 tablet (100 mg total) by mouth once daily.    NIFEdipine (PROCARDIA-XL) 30 MG (OSM) 24 hr tablet Take 1 tablet (30 mg total) by mouth 2 (two) times a day.    nitroGLYCERIN (NITROSTAT) 0.4 MG SL tablet place 1 TABLET UNDER THE TONGUE every five minutes AS NEEDED FOR CHEST pain    pantoprazole (PROTONIX) 40 MG tablet Take 1 tablet (40 mg total) by mouth once daily.    pen needle, diabetic (BD ULTRA-FINE HIEN PEN NEEDLE) 32 gauge x 5/32" Ndle 1 each by Misc.(Non-Drug; Combo Route) route 4 (four) times daily.    pregabalin (LYRICA) 150 MG capsule Take 1 capsule (150 mg total) by mouth 3 (three) times daily.    senna-docusate 8.6-50 mg (PERICOLACE) 8.6-50 mg per tablet Take 1 tablet by mouth 2 (two) times daily as needed for Constipation.    sertraline (ZOLOFT) 100 MG tablet Take 1 tablet (100 mg total) by mouth once daily.    sulfamethoxazole-trimethoprim 800-160mg (BACTRIM DS) 800-160 mg Tab sulfamethoxazole 800 mg-trimethoprim 160 mg tablet    teriparatide 20 mcg/dose (620mcg/2.48mL) PnIj Inject 20 mcg into the skin once daily.    triamcinolone acetonide 0.1% (KENALOG) 0.1 % cream Apply to affected areas of body twice daily as needed rash. Do not use on face, underarms, or groin.    TRUEPLUS LANCETS 30 gauge Misc TEST AS DIRECTED FOUR TIMES DAILY   Last reviewed on 6/27/2023 10:57 AM by Elvira Hylton NP    Review of patient's allergies indicates:   Allergen Reactions    Bleach (sodium hypochlorite) Shortness Of Breath    Nitrofurantoin macrocrystalline Anaphylaxis    Lipitor [atorvastatin] Diarrhea and Rash    Nsaids (non-steroidal anti-inflammatory drug)     Pcn [penicillins]     Toradol [ketorolac]     Last reviewed on  7/7/2023 4:47 PM by Maxime Isbell      Tasks added this encounter   No tasks added.   Tasks due within next 3 months   7/20/2023 - Refill Coordination Outreach (1 time occurrence)     Desiree Luna, PharmD  [facility]  [address]  [facility]  [facility] [address]  Phone: [phone]  Fax: [phone]

## 2023-07-31 NOTE — TELEPHONE ENCOUNTER
Refill Routing Note   Medication(s) are not appropriate for processing by [facility] for the following reason(s):      Patient seen in ED/Hospital since LOV with PCP  Required labs abnormal    [facility] action(s):  Defer None identified        Pharmacist review requested: Yes     Appointments  past 12m or future 3m with PCP    Date Provider   Last Visit   11/1/2022 Chun Zapata MD   Next Visit   10/26/2023 Chun Zapata MD   ED visits in past 90 days: 0        Note composed:3:11 PM 07/31/2023

## 2023-07-31 NOTE — TELEPHONE ENCOUNTER
No care due was identified.  [facility] Embedded Care Due Messages. Reference number: [id number].   7/31/2023 8:04:46 AM CDT

## 2023-07-31 NOTE — TELEPHONE ENCOUNTER
Refill Routing Note   Medication(s) are not appropriate for processing by [facility] for the following reason(s):      Patient seen in ED/Hospital since LOV with provider: 1/4/23 due to leg swelling    [facility] action(s):  Defer Care Due:  None identified        Pharmacist review requested: Yes     Appointments  past 12m or future 3m with PCP    Date Provider   Last Visit   11/1/2022 Chun Zapata MD   Next Visit   10/26/2023 Chun Zapata MD   ED visits in past 90 days: 0        Note composed:3:16 PM 07/31/2023

## 2023-08-01 RX ORDER — FUROSEMIDE 40 MG/1
40 TABLET ORAL
Qty: 90 TABLET | Refills: 1 | Status: SHIPPED | OUTPATIENT
Start: 2023-08-01 | End: 2024-03-12

## 2023-08-03 ENCOUNTER — SPECIALTY PHARMACY (OUTPATIENT)
Dept: PHARMACY | Facility: CLINIC | Age: 78
End: 2023-08-03
Payer: MEDICARE

## 2023-08-07 NOTE — TELEPHONE ENCOUNTER
Specialty Pharmacy - Refill Coordination    Specialty Medication Orders Linked to Encounter      Flowsheet Row Most Recent Value   Medication #1 evolocumab (REPATHA SURECLICK) 140 mg/mL PnIj (Order#[id number], Rx#[id number])            Refill Questions - Documented Responses      Flowsheet Row Most Recent Value   Patient Availability and HIPAA Verification    Does patient want to proceed with activity? Yes   HIPAA/medical authority confirmed? Yes   Relationship to patient of person spoken to? Self   Refill Screening Questions    Would patient like to speak to a pharmacist? No   When does the patient need to receive the medication? 08/09/23   Refill Delivery Questions    How will the patient receive the medication? MEDRx   When does the patient need to receive the medication? 08/09/23   Shipping Address Home   Address in [facility] confirmed and updated if neccessary? Yes   Expected Copay ($) 0   Is the patient able to afford the medication copay? Yes   Payment Method zero copay   Days supply of Refill 28   Supplies needed? No supplies needed   Refill activity completed? Yes   Refill activity plan Refill scheduled   Shipment/Pickup Date: 08/08/23            Current Outpatient Medications   Medication Sig    acetaminophen (TYLENOL) 500 MG tablet Take 2 tablets (1,000 mg total) by mouth every 8 (eight) hours as needed for Pain.    allopurinoL (ZYLOPRIM) 300 MG tablet Take 1 tablet (300 mg total) by mouth once daily.    aspirin (ECOTRIN) 81 MG EC tablet Take 1 tablet (81 mg total) by mouth once daily.    blood sugar diagnostic Strp 1 each by Misc.(Non-Drug; Combo Route) route 4 (four) times daily.    blood-glucose meter Misc Follow package directions (Patient taking differently: Follow package directions)    blood-glucose meter,continuous (DEXCOM G6 ) Misc 1 each by Misc.(Non-Drug; Combo Route) route continuous prn.    blood-glucose sensor (DEXCOM G6 SENSOR) Nicole 3 each by Misc.(Non-Drug; Combo Route)  route continuous prn. Change every 10 days.    blood-glucose transmitter (DEXCOM G6 TRANSMITTER) Nicole 1 each by Misc.(Non-Drug; Combo Route) route continuous prn.    cloNIDine (CATAPRES) 0.1 MG tablet TAKE ONE TABLET BY MOUTH TWICE DAILY    evolocumab (REPATHA SURECLICK) 140 mg/mL PnIj Inject 1 mL (140 mg total) into the skin every 14 (fourteen) days.    fenofibrate micronized (LOFIBRA) 134 MG Cap Take 1 capsule (134 mg total) by mouth daily with breakfast.    furosemide (LASIX) 40 MG tablet TAKE ONE TABLET BY MOUTH EVERY DAY    HYDROcodone-acetaminophen (NORCO)  mg per tablet Take 1 tablet by mouth every 8 (eight) hours as needed for Pain.    hydrocortisone (ANUSOL-HC) 2.5 % rectal cream Procto-Med HC 2.5 % topical cream perineal applicator    insulin detemir U-100, Levemir, (LEVEMIR FLEXPEN) 100 unit/mL (3 mL) InPn pen INJECT 20 UNITS into THE SKIN TWICE DAILY    insulin detemir U-100, Levemir, (LEVEMIR FLEXTOUCH U-100 INSULN) 100 unit/mL (3 mL) InPn pen Inject 20 Units into the skin 2 (two) times daily.    ipratropium-albuteroL (COMBIVENT)  mcg/actuation inhaler Inhale 1 puff into the lungs by mouth every 6 (six) hours.    losartan (COZAAR) 50 MG tablet Take 1 tablet (50 mg total) by mouth once daily.    metoclopramide HCl (REGLAN) 5 MG tablet Take 1 tablet (5 mg total) by mouth 4 (four) times daily as needed (nausea, prevention).    metoprolol succinate (TOPROL-XL) 100 MG 24 hr tablet Take 1 tablet (100 mg total) by mouth once daily.    NIFEdipine (PROCARDIA-XL) 30 MG (OSM) 24 hr tablet Take 1 tablet (30 mg total) by mouth 2 (two) times a day.    nitroGLYCERIN (NITROSTAT) 0.4 MG SL tablet place 1 TABLET UNDER THE TONGUE every five minutes AS NEEDED FOR CHEST pain    NOVOLOG FLEXPEN U-100 INSULIN 100 unit/mL (3 mL) InPn pen INJECT 12 units into THE SKIN 3 TIMES DAILY with meals plus correction scale. MAX 40 UNITS    pantoprazole (PROTONIX) 40 MG tablet Take 1 tablet (40 mg total) by mouth once daily.  "   pen needle, diabetic (BD ULTRA-FINE HIEN PEN NEEDLE) 32 gauge x 5/32" Ndle 1 each by Misc.(Non-Drug; Combo Route) route 4 (four) times daily.    pregabalin (LYRICA) 150 MG capsule Take 1 capsule (150 mg total) by mouth 3 (three) times daily.    senna-docusate 8.6-50 mg (PERICOLACE) 8.6-50 mg per tablet Take 1 tablet by mouth 2 (two) times daily as needed for Constipation.    sertraline (ZOLOFT) 100 MG tablet Take 1 tablet (100 mg total) by mouth once daily.    sulfamethoxazole-trimethoprim 800-160mg (BACTRIM DS) 800-160 mg Tab sulfamethoxazole 800 mg-trimethoprim 160 mg tablet    teriparatide 20 mcg/dose (620mcg/2.48mL) PnIj Inject 20 mcg into the skin once daily.    triamcinolone acetonide 0.1% (KENALOG) 0.1 % cream Apply to affected areas of body twice daily as needed rash. Do not use on face, underarms, or groin.    TRUEPLUS LANCETS 30 gauge Misc TEST AS DIRECTED FOUR TIMES DAILY   Last reviewed on 6/27/2023 10:57 AM by Elvira Hylton NP    Review of patient's allergies indicates:   Allergen Reactions    Bleach (sodium hypochlorite) Shortness Of Breath    Nitrofurantoin macrocrystalline Anaphylaxis    Lipitor [atorvastatin] Diarrhea and Rash    Nsaids (non-steroidal anti-inflammatory drug)     Pcn [penicillins]     Toradol [ketorolac]     Last reviewed on  7/31/2023 10:45 AM by Yamile Gamez      Tasks added this encounter   No tasks added.   Tasks due within next 3 months   8/6/2023 - Refill Coordination Outreach (1 time occurrence)     Karon Mcbride, JonathanD  [facility]  [facility] [facility] [address]  Phone: [phone]  Fax: [phone]        "

## 2023-08-09 DIAGNOSIS — E11.59 TYPE 2 DIABETES MELLITUS WITH OTHER CIRCULATORY COMPLICATION, WITHOUT LONG-TERM CURRENT USE OF INSULIN: ICD-10-CM

## 2023-08-09 DIAGNOSIS — I10 ESSENTIAL HYPERTENSION: ICD-10-CM

## 2023-08-09 DIAGNOSIS — E78.1 HYPERTRIGLYCERIDEMIA: ICD-10-CM

## 2023-08-09 DIAGNOSIS — K31.84 GASTROPARESIS: ICD-10-CM

## 2023-08-09 DIAGNOSIS — F41.9 ANXIETY DISORDER, UNSPECIFIED TYPE: ICD-10-CM

## 2023-08-09 DIAGNOSIS — I20.89 ANGINA AT REST: ICD-10-CM

## 2023-08-09 DIAGNOSIS — E79.0 HYPERURICEMIA: ICD-10-CM

## 2023-08-09 RX ORDER — SERTRALINE HYDROCHLORIDE 100 MG/1
100 TABLET, FILM COATED ORAL DAILY
Qty: 90 TABLET | Refills: 3 | Status: SHIPPED | OUTPATIENT
Start: 2023-08-09

## 2023-08-09 RX ORDER — INSULIN ASPART 100 [IU]/ML
12 INJECTION, SOLUTION INTRAVENOUS; SUBCUTANEOUS
Qty: 15 ML | Refills: 3 | Status: SHIPPED | OUTPATIENT
Start: 2023-08-09 | End: 2023-08-20 | Stop reason: SDUPTHER

## 2023-08-09 RX ORDER — FENOFIBRATE 134 MG/1
134 CAPSULE ORAL
Qty: 90 CAPSULE | Refills: 3 | Status: SHIPPED | OUTPATIENT
Start: 2023-08-09 | End: 2024-08-08

## 2023-08-09 RX ORDER — NITROGLYCERIN 0.4 MG/1
0.4 TABLET SUBLINGUAL EVERY 5 MIN PRN
Qty: 25 TABLET | Refills: 0 | Status: SHIPPED | OUTPATIENT
Start: 2023-08-09 | End: 2023-09-20 | Stop reason: SDUPTHER

## 2023-08-09 RX ORDER — FUROSEMIDE 40 MG/1
40 TABLET ORAL
Qty: 90 TABLET | Refills: 1 | Status: CANCELLED | OUTPATIENT
Start: 2023-08-09

## 2023-08-09 RX ORDER — ALLOPURINOL 300 MG/1
300 TABLET ORAL DAILY
Qty: 90 TABLET | Refills: 1 | Status: SHIPPED | OUTPATIENT
Start: 2023-08-09 | End: 2024-01-22

## 2023-08-09 RX ORDER — METOPROLOL SUCCINATE 100 MG/1
100 TABLET, EXTENDED RELEASE ORAL DAILY
Qty: 90 TABLET | Refills: 3 | Status: SHIPPED | OUTPATIENT
Start: 2023-08-09

## 2023-08-09 RX ORDER — METOCLOPRAMIDE 5 MG/1
5 TABLET ORAL 4 TIMES DAILY PRN
Qty: 30 TABLET | Refills: 3 | Status: SHIPPED | OUTPATIENT
Start: 2023-08-09 | End: 2023-08-20 | Stop reason: SDUPTHER

## 2023-08-09 RX ORDER — CLONIDINE HYDROCHLORIDE 0.1 MG/1
0.1 TABLET ORAL 2 TIMES DAILY
Qty: 180 TABLET | Refills: 0 | Status: SHIPPED | OUTPATIENT
Start: 2023-08-09 | End: 2024-02-27 | Stop reason: SDUPTHER

## 2023-08-09 NOTE — TELEPHONE ENCOUNTER
No care due was identified.  [facility] Embedded Care Due Messages. Reference number: [id number].   8/09/2023 2:35:29 PM CDT

## 2023-08-15 ENCOUNTER — TELEPHONE (OUTPATIENT)
Dept: OTOLARYNGOLOGY | Facility: CLINIC | Age: 78
End: 2023-08-15
Payer: MEDICARE

## 2023-08-15 DIAGNOSIS — G89.4 CHRONIC PAIN SYNDROME: ICD-10-CM

## 2023-08-15 DIAGNOSIS — G89.4 CHRONIC PAIN DISORDER: ICD-10-CM

## 2023-08-15 DIAGNOSIS — D69.2 PALPABLE PURPURA: ICD-10-CM

## 2023-08-15 DIAGNOSIS — E08.42 DIABETIC POLYNEUROPATHY ASSOCIATED WITH DIABETES MELLITUS DUE TO UNDERLYING CONDITION: ICD-10-CM

## 2023-08-15 RX ORDER — HYDROCODONE BITARTRATE AND ACETAMINOPHEN 10; 325 MG/1; MG/1
1 TABLET ORAL EVERY 8 HOURS PRN
Qty: 90 TABLET | Refills: 0 | Status: SHIPPED | OUTPATIENT
Start: 2023-08-15 | End: 2023-09-15 | Stop reason: SDUPTHER

## 2023-08-15 RX ORDER — HYDROCODONE BITARTRATE AND ACETAMINOPHEN 10; 325 MG/1; MG/1
1 TABLET ORAL EVERY 8 HOURS PRN
Qty: 90 TABLET | Refills: 0 | OUTPATIENT
Start: 2023-08-15 | End: 2023-09-14

## 2023-08-15 RX ORDER — TRIAMCINOLONE ACETONIDE 1 MG/G
CREAM TOPICAL
Qty: 454 G | Refills: 0 | OUTPATIENT
Start: 2023-08-15

## 2023-08-15 NOTE — TELEPHONE ENCOUNTER
Patient requesting refill on Hydrocodone  Last office visit 06/27/2023   shows last refill on 07/17/2023  Patient does not have a pain contract on file with [facility] department  Patient last UDS 06/16/2023 was consistent with current therapy     CODEINE  Not Detected     Not Detected     MORPHINE  Not Detected     Not Detected     6-ACETYLMORPHINE  Not Detected     Not Detected     OXYCODONE  Not Detected     Not Detected     NOROYXCODONE  Not Detected     Not Detected     OXYMORPHONE  Not Detected     Not Detected     NOROXYMORPHONE  Not Detected     Not Detected     HYDROCODONE  Present     Present     NORHYDROCODONE  Present     Present     HYDROMORPHONE  Present     Present     BUPRENORPHINE  Not Detected     Not Detected     NORUBPRENORPHINE  Not Detected     Not Detected     FENTANYL  Not Detected     Not Detected     NORFENTANYL  Not Detected     Not Detected     MEPERIDINE METABOLITE  Not Detected     Not Detected     TAPENTADOL  Not Detected     Not Detected     TAPENTADOL-O-SULF  Not Detected     Not Detected     METHADONE  Not Detected     Not Detected     TRAMADOL  Not Detected     Not Detected     AMPHETAMINE  Not Detected     Not Detected     METHAMPHETAMINE  Not Detected     Not Detected     MDMA- ECSTASY  Not Detected     Not Detected     MDA  Not Detected     Not Detected     MDEA- Mary  Not Detected     Not Detected     METHYLPHENIDATE  Not Detected     Not Detected     PHENTERMINE  Not Detected     Not Detected     BENZOYLECGONINE  Not Detected     Not Detected     ALPRAZOLAM  Not Detected     Not Detected     ALPHA-OH-ALPRAZOLAM  Not Detected     Not Detected     CLONAZEPAM  Not Detected     Not Detected     7-AMINOCLONAZEPAM  Not Detected     Not Detected     DIAZEPAM  Not Detected     Not Detected     NORDIAZEPAM  Not Detected     Not Detected     OXAZEPAM  Not Detected     Not Detected     TEMAZEPAM  Not Detected     Not Detected     Lorazepam  Not Detected     Not  Detected     MIDAZOLAM  Not Detected     Not Detected     ZOLPIDEM  Not Detected     Not Detected     BARBITURATES  Not Detected     Not Detected     Creatinine, Urine 20.0 - 400.0 mg/dL 21.1  32.4 R  22.8 R  89.0 R  62.7  36.9 R, CM    ETHYL GLUCURONIDE  Not Detected     Not Detected     MARIJUANA METABOLITE  Not Detected     Not Detected     Comment: INTERPRETIVE INFORMATION: Marijuana Metabolite   The cutoff for Marijuana Metabolite (immunoassay) was   changed from 20 ng/mL to 50 ng/mL,  effective May 15, 2023.    PCP  Not Detected     Not Detected     CARISOPRODOL  Not Detected     Not Detected CM     Comment: The carisoprodol immunoassay has cross-reactivity to   carisoprodol and meprobamate.    Naloxone  Not Detected     Not Detected     Gabapentin  Not Detected     Not Detected     Pregabalin  Present     Present     Alpha-OH-Midazolam  Not Detected     Not Detected     Zolpidem Metabolite  Not Detected     Not Detected     PAIN MANAGEMENT DRUG PANEL  See Below

## 2023-08-20 ENCOUNTER — PATIENT MESSAGE (OUTPATIENT)
Dept: ADMINISTRATIVE | Facility: HOSPITAL | Age: 78
End: 2023-08-20
Payer: MEDICARE

## 2023-08-20 DIAGNOSIS — E11.59 TYPE 2 DIABETES MELLITUS WITH OTHER CIRCULATORY COMPLICATION, WITHOUT LONG-TERM CURRENT USE OF INSULIN: ICD-10-CM

## 2023-08-20 DIAGNOSIS — K31.84 GASTROPARESIS: ICD-10-CM

## 2023-08-20 RX ORDER — BLOOD-GLUCOSE CONTROL, NORMAL
EACH MISCELLANEOUS
Qty: 200 EACH | Refills: 2 | Status: SHIPPED | OUTPATIENT
Start: 2023-08-20 | End: 2023-08-20 | Stop reason: SDUPTHER

## 2023-08-20 NOTE — TELEPHONE ENCOUNTER
Care Due:                  Date            Visit Type   Department     Provider  --------------------------------------------------------------------------------                                ESTABLISHED                              PATIENT -    [facility] INTERNAL  Last Visit: 11-      VIRTUAL      MEDICINE       Chun Zapata                              MYCHART                              FOLLOWUP/OF  [facility] INTERNAL  Next Visit: 10-      FICE VISIT   MEDICINE       Chun Zapata                                                            Last  Test          Frequency    Reason                     Performed    Due Date  --------------------------------------------------------------------------------    HBA1C.......  6 months...  insulin..................  05- 11-    [facility] Embedded Care Due Messages. Reference number: [id number].   8/20/2023 12:02:14 AM CDT

## 2023-08-20 NOTE — TELEPHONE ENCOUNTER
No care due was identified.  [facility] Embedded Care Due Messages. Reference number: [id number].   8/20/2023 5:37:10 PM CDT

## 2023-08-21 ENCOUNTER — PATIENT OUTREACH (OUTPATIENT)
Dept: ADMINISTRATIVE | Facility: HOSPITAL | Age: 78
End: 2023-08-21
Payer: MEDICARE

## 2023-08-21 DIAGNOSIS — E11.65 TYPE 2 DIABETES MELLITUS WITH HYPERGLYCEMIA, WITH LONG-TERM CURRENT USE OF INSULIN: Primary | Chronic | ICD-10-CM

## 2023-08-21 DIAGNOSIS — Z79.4 TYPE 2 DIABETES MELLITUS WITH HYPERGLYCEMIA, WITH LONG-TERM CURRENT USE OF INSULIN: Primary | Chronic | ICD-10-CM

## 2023-08-21 RX ORDER — INSULIN ASPART 100 [IU]/ML
12 INJECTION, SOLUTION INTRAVENOUS; SUBCUTANEOUS
Qty: 15 ML | Refills: 3 | OUTPATIENT
Start: 2023-08-21

## 2023-08-21 RX ORDER — BLOOD-GLUCOSE CONTROL, NORMAL
EACH MISCELLANEOUS
Qty: 200 EACH | Refills: 2 | Status: SHIPPED | OUTPATIENT
Start: 2023-08-21 | End: 2023-09-29

## 2023-08-21 RX ORDER — INSULIN ASPART 100 [IU]/ML
12 INJECTION, SOLUTION INTRAVENOUS; SUBCUTANEOUS
Qty: 15 ML | Refills: 3 | Status: SHIPPED | OUTPATIENT
Start: 2023-08-21 | End: 2023-09-20 | Stop reason: SDUPTHER

## 2023-08-21 RX ORDER — METOCLOPRAMIDE 5 MG/1
5 TABLET ORAL 4 TIMES DAILY PRN
Qty: 30 TABLET | Refills: 3 | Status: SHIPPED | OUTPATIENT
Start: 2023-08-21 | End: 2023-09-20 | Stop reason: SDUPTHER

## 2023-08-21 NOTE — TELEPHONE ENCOUNTER
Refill Routing Note   Medication(s) are not appropriate for processing by [facility] for the following reason(s):      Medication outside of protocol: metoclopramide   Patient seen in ED/Hospital since LOV with provider  Required labs abnormal: Novolog Flexpen (Cr)    [facility] action(s):  Defer  Route Care Due:  Labs due              Appointments  past 12m or future 3m with PCP    Date Provider   Last Visit   11/1/2022 Chun Zapata MD   Next Visit   8/20/2023 Chun Zapata MD   ED visits in past 90 days: 0        Note composed:10:49 AM 08/21/2023

## 2023-08-21 NOTE — TELEPHONE ENCOUNTER
Refill Decision Note   Indira Waters  is requesting a refill authorization.  Brief Assessment and Rationale for Refill:  Quick Discontinue     Medication Therapy Plan:  Duplicate Request-  med pended in previous encounter, awaiting assessment      Comments:     No Care Gaps recommended.     Note composed:10:57 AM 08/21/2023

## 2023-08-22 ENCOUNTER — PATIENT OUTREACH (OUTPATIENT)
Dept: ADMINISTRATIVE | Facility: HOSPITAL | Age: 78
End: 2023-08-22
Payer: MEDICARE

## 2023-08-22 DIAGNOSIS — E11.65 TYPE 2 DIABETES MELLITUS WITH HYPERGLYCEMIA, WITH LONG-TERM CURRENT USE OF INSULIN: ICD-10-CM

## 2023-08-22 DIAGNOSIS — Z79.4 TYPE 2 DIABETES MELLITUS WITH HYPERGLYCEMIA, WITH LONG-TERM CURRENT USE OF INSULIN: ICD-10-CM

## 2023-08-24 DIAGNOSIS — G89.4 CHRONIC PAIN DISORDER: ICD-10-CM

## 2023-08-25 RX ORDER — PREGABALIN 150 MG/1
150 CAPSULE ORAL 3 TIMES DAILY
Qty: 90 CAPSULE | Refills: 2 | Status: SHIPPED | OUTPATIENT
Start: 2023-08-25 | End: 2023-09-14

## 2023-08-31 DIAGNOSIS — G89.4 CHRONIC PAIN DISORDER: ICD-10-CM

## 2023-08-31 DIAGNOSIS — G89.4 CHRONIC PAIN SYNDROME: ICD-10-CM

## 2023-08-31 DIAGNOSIS — E08.42 DIABETIC POLYNEUROPATHY ASSOCIATED WITH DIABETES MELLITUS DUE TO UNDERLYING CONDITION: ICD-10-CM

## 2023-08-31 RX ORDER — METHOCARBAMOL 500 MG/1
500 TABLET, FILM COATED ORAL 3 TIMES DAILY PRN
Qty: 90 TABLET | Refills: 2 | Status: SHIPPED | OUTPATIENT
Start: 2023-08-31 | End: 2023-11-20 | Stop reason: SDUPTHER

## 2023-08-31 NOTE — TELEPHONE ENCOUNTER
----- Message from Michele Reyes sent at 8/31/2023 11:38 AM CDT -----      Can the clinic reply in [facility]:Y        Please refill the medication(s) listed below. Please call the patient when the prescription(s) is ready for  at this phone number         Medication #1 Robaxin     Medication #2       Preferred Pharmacy: [place] [id number]   Phone:  [phone]  Fax:  [phone]

## 2023-08-31 NOTE — TELEPHONE ENCOUNTER
----- Message from Michele Reyes sent at 8/31/2023 11:38 AM CDT -----      Can the clinic reply in [facility]:Y        Please refill the medication(s) listed below. Please call the patient when the prescription(s) is ready for  at this phone number         Medication #1 Robaxin     Medication #2       Preferred Pharmacy: [place] [address]   Phone:  [phone]  Fax:  [phone]

## 2023-09-01 DIAGNOSIS — E08.42 DIABETIC POLYNEUROPATHY ASSOCIATED WITH DIABETES MELLITUS DUE TO UNDERLYING CONDITION: ICD-10-CM

## 2023-09-01 DIAGNOSIS — G89.4 CHRONIC PAIN SYNDROME: ICD-10-CM

## 2023-09-01 DIAGNOSIS — G89.4 CHRONIC PAIN DISORDER: ICD-10-CM

## 2023-09-06 RX ORDER — METHOCARBAMOL 500 MG/1
TABLET, FILM COATED ORAL
Qty: 90 TABLET | Refills: 2 | OUTPATIENT
Start: 2023-09-06

## 2023-09-10 DIAGNOSIS — G89.4 CHRONIC PAIN DISORDER: ICD-10-CM

## 2023-09-14 DIAGNOSIS — M81.0 AGE-RELATED OSTEOPOROSIS WITHOUT CURRENT PATHOLOGICAL FRACTURE: ICD-10-CM

## 2023-09-14 RX ORDER — PREGABALIN 150 MG/1
CAPSULE ORAL
Qty: 90 CAPSULE | Refills: 2 | Status: SHIPPED | OUTPATIENT
Start: 2023-09-14 | End: 2023-12-04

## 2023-09-15 ENCOUNTER — OFFICE VISIT (OUTPATIENT)
Dept: PAIN MEDICINE | Facility: CLINIC | Age: 78
End: 2023-09-15
Payer: MEDICARE

## 2023-09-15 ENCOUNTER — LAB VISIT (OUTPATIENT)
Dept: LAB | Facility: OTHER | Age: 78
End: 2023-09-15
Attending: STUDENT IN AN ORGANIZED HEALTH CARE EDUCATION/TRAINING PROGRAM
Payer: MEDICARE

## 2023-09-15 VITALS
HEART RATE: 85 BPM | WEIGHT: 199.06 LBS | HEIGHT: 61 IN | RESPIRATION RATE: 18 BRPM | DIASTOLIC BLOOD PRESSURE: 78 MMHG | BODY MASS INDEX: 37.58 KG/M2 | OXYGEN SATURATION: 100 % | SYSTOLIC BLOOD PRESSURE: 149 MMHG

## 2023-09-15 DIAGNOSIS — E11.65 TYPE 2 DIABETES MELLITUS WITH HYPERGLYCEMIA, WITH LONG-TERM CURRENT USE OF INSULIN: ICD-10-CM

## 2023-09-15 DIAGNOSIS — E08.42 DIABETIC POLYNEUROPATHY ASSOCIATED WITH DIABETES MELLITUS DUE TO UNDERLYING CONDITION: ICD-10-CM

## 2023-09-15 DIAGNOSIS — Z79.4 TYPE 2 DIABETES MELLITUS WITH HYPERGLYCEMIA, WITH LONG-TERM CURRENT USE OF INSULIN: ICD-10-CM

## 2023-09-15 DIAGNOSIS — G89.4 CHRONIC PAIN SYNDROME: ICD-10-CM

## 2023-09-15 DIAGNOSIS — G89.4 CHRONIC PAIN DISORDER: ICD-10-CM

## 2023-09-15 DIAGNOSIS — E11.40 PAINFUL DIABETIC NEUROPATHY: Primary | ICD-10-CM

## 2023-09-15 LAB
ALBUMIN/CREAT UR: 29.2 UG/MG (ref 0–30)
CREAT UR-MCNC: 24 MG/DL (ref 15–325)
MICROALBUMIN UR DL<=1MG/L-MCNC: 7 UG/ML

## 2023-09-15 PROCEDURE — 99214 OFFICE O/P EST MOD 30 MIN: CPT | Mod: S$PBB,,, | Performed by: NURSE PRACTITIONER

## 2023-09-15 PROCEDURE — 82570 ASSAY OF URINE CREATININE: CPT | Performed by: STUDENT IN AN ORGANIZED HEALTH CARE EDUCATION/TRAINING PROGRAM

## 2023-09-15 PROCEDURE — 99215 OFFICE O/P EST HI 40 MIN: CPT | Mod: PBBFAC | Performed by: NURSE PRACTITIONER

## 2023-09-15 PROCEDURE — 99999 PR PBB SHADOW E&M-EST. PATIENT-LVL V: ICD-10-PCS | Mod: PBBFAC,,, | Performed by: NURSE PRACTITIONER

## 2023-09-15 PROCEDURE — 99214 PR OFFICE/OUTPT VISIT, EST, LEVL IV, 30-39 MIN: ICD-10-PCS | Mod: S$PBB,,, | Performed by: NURSE PRACTITIONER

## 2023-09-15 PROCEDURE — 99999 PR PBB SHADOW E&M-EST. PATIENT-LVL V: CPT | Mod: PBBFAC,,, | Performed by: NURSE PRACTITIONER

## 2023-09-15 RX ORDER — HYDROCODONE BITARTRATE AND ACETAMINOPHEN 10; 325 MG/1; MG/1
1 TABLET ORAL EVERY 8 HOURS PRN
Qty: 90 TABLET | Refills: 0 | Status: SHIPPED | OUTPATIENT
Start: 2023-09-15 | End: 2023-10-12 | Stop reason: SDUPTHER

## 2023-09-15 RX ORDER — TERIPARATIDE 250 UG/ML
20 INJECTION, SOLUTION SUBCUTANEOUS DAILY
Qty: 2.48 ML | Refills: 1 | Status: ACTIVE | OUTPATIENT
Start: 2023-09-15 | End: 2023-11-10 | Stop reason: SDUPTHER

## 2023-09-15 NOTE — PROGRESS NOTES
Chronic patient Established Note (Follow up visit)        Interval History 9/15/2023:  The patient returns to clinic today for follow up of back and leg pain. She did have benefit with Qutenza patches. She continues to report low back pain with intermittent radicular leg pain. She continues to report neuropathic pain into her feet. She continues to take Lyrica and Robaxin with benefit. She also takes Norco as needed with benefit. She denies any adverse effects. She denies any other health changes. Her pain today is 9/10.    Interval History 6/27/2023:  The patient returns to clinic today for follow up of pain. She continues to report nerve pain to her feet bilaterally. This pain is worse with standing and walking. She is taking Lyrica and Robaxin. She also takes Norco as needed with benefit and without adverse effects. She denies any other health changes. Her pain today is 9/10.    Interval History 6/16/2023:  The patient returns to clinic today for follow up of back and leg pain. She continues to report low back pain. She also reports diabetic neuropathy to her bilateral feet. Her pain is worse with standing and walking. She has tried Qutenza patches with benefit in the past. She continues to take Robaxin and Lyrica. She also takes Norco as needed with benefit and without adverse effects. She denies any other health changes. Her pain today is 10/10.    Interval History 3/17/2023:  Mrs Waters presents for follow up of chronic, continuous neuropathic pain to [facility]. She is s/p Nevro SCS trial and unfortunately she did not get the relief she was hoping for and 50% at best relief but this was not consistent. She has no constitutional s/s concerning for infection and denies newer neurological changes since trial. She continues with medication mgt and states Qutenza application has been providing significant benefit.     Interval History 2/10/2023:  Mrs Waters presents for follow up of chronic lower back painn and radicular  pain in conjunction with PDN to bilateral feet. She is doing fair with medication mgt of Norco, Robaxin, and Lyrica and does need refill at this time. She denies SE of medications. She is also here for Qutenza application today. She is scheduled to have upcoming SCS trial with [facility] to aslo address her PDN.     Interval History 12/13/2022:  Mrs Waters presents for follow up of chronic pain. She is ready to repeat Qutenza application as it has been >91 days and she had significant improvement of symptoms of PDN. She recently had repeat A1C and just above 10.0 but is decreasing. She continues to take medication with benefit and denies SE of medication. Medication regimen include Norco 10/325mg TID, Robaxin 500mg and Lyrica 150mg.     Interval History 10/12/2022:  Mrs Waters presents for follow up of chronic neuropathy. She is s/p qutenza patch placement with improved functioning and neuropathy control. Unfortunately her A1C was above 11 which inhibits her from Nevro SCS trial at this time. She is eager to have SCS trial. She denies new areas of pain or neurological changes. She continued to take  Norco 10/325mg TID, Robaxin 500mg and Lyrica 150mg TID with benefit and denies significant SE of medications.     Interval History 9/8/2022:  Mrs Waters presents for follow up of chronic lower back painn and radicular pain in conjunction with PDN to bilateral feet. She is doing fair with medication mgt of Norco, Robaxin, and Lyrica and does need refill at this time. She denies SE of medications. She is patiently awaiting her A1C to become lower than 10 to proceed with SCS trial.     Interval History 8/12/2022:  Mrs Waters presents for delayed FU. She has had continuous pain to lumbar and radicular pain but also peripheral neuropathy complaints. Over interval she has had CVA but doing fair. Her A1C has unfortunately elevated above 10 at this time and SCS trial placed on hold but phychiatric evaluation complete. She continues to  take Norco 10/325mg TID, Robaxin 500mg TID and Lyrica 150mg TID with some benefit and denies SE of medications. No s/s concerning for cauda equina.     Interval History 4/12/2022:  Patient returns to clinic today for follow-up. Her neuropathic pain continues to be an issue for her, but she says that she is able to manage. She is taking Norco 10-325mg TID, Robaxin 500mg TID, and Lyrica 150mg TID without any adverse side effects. She denies any changes in her symptoms. She would like to proceed with SCS trial. Discussed last time that her HbA1c should be <10, was 9.8 on most recent labs. Seen by psychology and cleared for SCS.     Interval History 2/14/2022:  Mrs Waters presents for follow up. Pt states overall neuropathy continues. Since last visit she has been stable with medication mgt and takign Norco 10/325mg TID, Robaxin 500mg TID and Lyrica 150mg TID. She denies new areas of pain or neurological changes. Medication adequate to control pain without adverse SE.      Interval History 12/21/2021:  Pt presents for urgent evaluation s/p fall in kitchen last night. Pt unsure of LOC or head trauma but states some amnesia of events. Pt is having left knee pain, B ankle pain L>R and lower back/buttock pain. Daughter further states tremor like activity last night. During visit daughter asked for bedside commode due to inability to ambulate.  Pt during visit appeared somnolent, pale and began vomiting. Discussed going to ER for further evaluation.      Interval History 12/14/2021:  Mrs Waters presents for follow up of chronic pain complaints. She is hopeful she will have A1C lower soon to move forward with SCS. She is doing fair with medication mgt alone at this time and denies SE of medications. Pt is currently taking Norco 10/325mg TID PRN, Lyrica 150mg TID, and Robaxin 500mg TID. She denies new areas of pain or neurological changes.      Interval History 10/14/2021:  The Pt presents for follow up and s/p B Lumbar  sympathetic blocks. Pt states this has done well but ready to proceed with SCS trial. She is aware A1C must be under tight control and Pt and daughter re-iterate knowing this. Pt also mentions DKA admission and diagnosis of vasculitis as well over interval. Pt continues to take hydrocodone 10/325 mg TID PRN, Lyrica 150 mg TID, and Robaxin 500mg TID. She denies any SE of medication, denies new neurological changes.      Interval Hx 09/16/2021  Indira Waters presents to the clinic for a follow-up appointment for f/u after bilateral lumbar sympathetic block on 8/25/21.Patient reports >50% relief after lumbar sympathetic block that lasted 2 weeks when she then developed a UTI/DKA, was admitted to the hospital and pain recurred.  Current pain intensity is 8/10.     Interval History 8/12/2021:  Indira Waters presents to the clinic for a follow-up appointment for BLE diabetic neuropathy, painful. Continues with Norco 10/325mg, Lyrica 150mg TID, Robaxin 750mg daily PRN. She had to cancel previously scheduled lumbar sympathetic block 2/2 lithotripsy for painful kidney stones, which have now passed. She still has intermittent pain with urination but overall that pain is improving. She had to cancel previous angiogram for this same reason - this has not been rescheduled yet. She denies any change in her LE pain. Denies any new neurological sxs in BLEs.     Interval History 6/10/21:  Indira Waters presents to the clinic for a 2 week follow-up appointment from lumbar sympathetic nerve block in early May. She reports minimal relief from this intervention, but did note that it helped some, briefly. Since the last visit, Indira Waters states the pain has been persistant. Current pain intensity is 10/10. Patient has chronic generalized diffuse pain, most pronounced in her BL lower extremities 2/2 diabetic neuropathy. HSe states that the pain is a little better than at her last visit. Most days are 10/10, but some days  "are better than others. Her pain is aggravated by exertion, walking, or sitting/standing for extended periods of time. He pain is mildly improved by rest and medications. She is currently taking Lyrica 150 PO TID, Zoloft, and Norco 10-325mg TID PRN. She states that the pain meds are helping but she is still constantly in pain and unable to participate in her ADLs. She has now established care with PCP for assistance w/ poorly controlled T2DM, last A1c 11.4. She has not yet established care w/ Rheumatology for fibromyalgia management.    Interval HPI 4/15/21:  Patient returns for follow up of chronic generalized diffuse pain. At the last visit, had EMG (not yet completed), lumbar and hip x-ray imaging ordered. She was also referred to Rheumatology for fibromyalgia management and referral to PCP for DM2 management and weaning of zoloft for transition to cymbalta for treatment of neuropathy. She had her Lyrica increased to 150mg PO TID, and prescribed Norco 10mg TID with opioid contract signed. She has been taking Norco 10mg TID and it has been helping her "bad" pains but does not take all of her pain away. She has not yet been set up with her new PCP or rheumatologist yet for fibromyalgia. Still continues to have generalized pain everywhere including feet, hips, legs, lower and upper back, neck and shoulder pain. Continues to have neuropathy in the bilateral lower extremities due to uncontrolled DM2.    Initial Visit 3/11/21:  Indira Waters presents to the clinic for the evaluation of chronic pain. Complaining of pain everywhere including feet, legs, hips, lower and upper back, neck, shoulder. Pain started 5+ years ago. Pain 10/10 at worse. She was referred here by her PCP Dr. Ramos who she has been seeing for over 6 years. She states her pain is aggravated by any physical activity/movement. She takes lyrica, robaxin, and Norco 10 TID with mild relief of pain. Per patient, she was referred here by her PCP for " medication refill. She has not tried physical therapy for several years, she states it did not help last time she was in PT. She says she is trying to exercise daily by doing yoga. She walks with a walker. She has numerous comorbidities including DM2 (Last A1C 9+ per prior family medicine note in Jan 2021), fibromyalgia, lupus, DDD. She states she would like to find a new PCP as she no longer lives near her old one. Patient denies night fever/night sweats, urinary incontinence, bowel incontinence and significant weight loss.      Pain Disability Index Review:      9/15/2023     2:03 PM 6/16/2023    11:37 AM 3/16/2023     3:33 PM   Last 3 PDI Scores   Pain Disability Index (PDI) 43 [phone] 63     Pain Medications:  Norco 10-325mg TID, Robaxin 500mg TID, and Lyrica 150mg TID    Opioid Contract: yes     report:  Reviewed and consistent with medication use as prescribed.    Pain Procedures:    - reports possible back injection 10+ years ago  - Lumbar sympathetic nerve block 05/05/21 - Dr. Pereira - minimal relief    Physical Therapy/Home Exercise: no     Imaging:   Hip X-ray 4/7/21  FINDINGS:  Osseous structures appear intact without evidence of fracture or osseous destructive process.  No apparent dislocation.     Modest degenerative change or significant joint space narrowing.     Lumbar X-ray 4/7/21  FINDINGS:  Diffuse bony demineralization.  Vertebral bodies are normal in height without evidence of fracture.  No pars defects.     Normal sagittal alignment is preserved.  No spondylolisthesis. No abnormal translation with flexion and extension.     Intervertebral disc heights are well maintained.  Mild facet arthropathy in the lower lumbar spine.     Mild scattered vascular calcification.     Impression:     No evidence of fracture or malalignment.     Mild facet arthropathy in the lower lumbar spine.     Diffuse bony demineralization.  Consider correlation with DEXA.    Allergies:   Review of patient's allergies  indicates:   Allergen Reactions    Bleach (sodium hypochlorite) Shortness Of Breath    Nitrofurantoin macrocrystalline Anaphylaxis    Lipitor [atorvastatin] Diarrhea and Rash    Nsaids (non-steroidal anti-inflammatory drug)     Pcn [penicillins]     Toradol [ketorolac]        Current Medications:   Current Outpatient Medications   Medication Sig Dispense Refill    acetaminophen (TYLENOL) 500 MG tablet Take 2 tablets (1,000 mg total) by mouth every 8 (eight) hours as needed for Pain.  0    allopurinoL (ZYLOPRIM) 300 MG tablet Take 1 tablet (300 mg total) by mouth once daily. 90 tablet 1    aspirin (ECOTRIN) 81 MG EC tablet Take 1 tablet (81 mg total) by mouth once daily. 30 tablet 0    blood sugar diagnostic Strp 1 each by Misc.(Non-Drug; Combo Route) route 4 (four) times daily. 200 each 0    blood-glucose meter Misc Follow package directions (Patient taking differently: Follow package directions) 1 each 0    cloNIDine (CATAPRES) 0.1 MG tablet Take 1 tablet (0.1 mg total) by mouth 2 (two) times daily. 180 tablet 0    evolocumab (REPATHA SURECLICK) 140 mg/mL PnIj Inject 1 mL (140 mg total) into the skin every 14 (fourteen) days. 8 each 0    fenofibrate micronized (LOFIBRA) 134 MG Cap Take 1 capsule (134 mg total) by mouth daily with breakfast. 90 capsule 3    furosemide (LASIX) 40 MG tablet TAKE ONE TABLET BY MOUTH EVERY DAY 90 tablet 1    HYDROcodone-acetaminophen (NORCO)  mg per tablet Take 1 tablet by mouth every 8 (eight) hours as needed for Pain. 90 tablet 0    hydrocortisone (ANUSOL-HC) 2.5 % rectal cream Procto-Med HC 2.5 % topical cream perineal applicator      insulin aspart U-100 (NOVOLOG FLEXPEN U-100 INSULIN) 100 unit/mL (3 mL) InPn pen Inject 12 Units into the skin 3 (three) times daily with meals. + meal correction scale. Max 40 units 15 mL 3    insulin detemir U-100, Levemir, (LEVEMIR FLEXPEN) 100 unit/mL (3 mL) InPn pen INJECT 20 UNITS into THE SKIN TWICE DAILY 15 mL 11    insulin detemir U-100,  "Levemir, (LEVEMIR FLEXTOUCH U-100 INSULN) 100 unit/mL (3 mL) InPn pen Inject 20 Units into the skin 2 (two) times daily. 15 mL 3    ipratropium-albuteroL (COMBIVENT)  mcg/actuation inhaler Inhale 1 puff into the lungs by mouth every 6 (six) hours. 4 g 0    lancets (TRUEPLUS LANCETS) 30 gauge Misc To check 4 times daily 200 each 2    methocarbamoL (ROBAXIN) 500 MG Tab Take 1 tablet (500 mg total) by mouth 3 (three) times daily as needed (muscle spasm). 90 tablet 2    metoclopramide HCl (REGLAN) 5 MG tablet Take 1 tablet (5 mg total) by mouth 4 (four) times daily as needed (nausea, prevention). 30 tablet 3    metoprolol succinate (TOPROL-XL) 100 MG 24 hr tablet Take 1 tablet (100 mg total) by mouth once daily. 90 tablet 3    nitroGLYCERIN (NITROSTAT) 0.4 MG SL tablet Place 1 tablet (0.4 mg total) under the tongue every 5 (five) minutes as needed for Chest pain. 25 tablet 0    pantoprazole (PROTONIX) 40 MG tablet Take 1 tablet (40 mg total) by mouth once daily. 30 tablet 0    pen needle, diabetic (BD ULTRA-FINE HIEN PEN NEEDLE) 32 gauge x 5/32" Ndle 1 each by Misc.(Non-Drug; Combo Route) route 4 (four) times daily. 400 each 3    pregabalin (LYRICA) 150 MG capsule TAKE ONE CAPSULE BY MOUTH 3 TIMES DAILY 90 capsule 2    senna-docusate 8.6-50 mg (PERICOLACE) 8.6-50 mg per tablet Take 1 tablet by mouth 2 (two) times daily as needed for Constipation. 60 tablet 0    sertraline (ZOLOFT) 100 MG tablet Take 1 tablet (100 mg total) by mouth once daily. 90 tablet 3    sulfamethoxazole-trimethoprim 800-160mg (BACTRIM DS) 800-160 mg Tab sulfamethoxazole 800 mg-trimethoprim 160 mg tablet      teriparatide 20 mcg/dose (620mcg/2.48mL) PnIj Inject 20 mcg into the skin once daily. 2.48 mL 11    triamcinolone acetonide 0.1% (KENALOG) 0.1 % cream Apply to affected areas of body twice daily as needed rash. Do not use on face, underarms, or groin. 454 g 0    blood-glucose meter,continuous (DEXCOM G6 ) Misc 1 each by " Misc.(Non-Drug; Combo Route) route continuous prn. 1 each PRN    blood-glucose sensor (DEXCOM G6 SENSOR) Nicole 3 each by Misc.(Non-Drug; Combo Route) route continuous prn. Change every 10 days. 3 each PRN    blood-glucose transmitter (DEXCOM G6 TRANSMITTER) Nicole 1 each by Misc.(Non-Drug; Combo Route) route continuous prn. 1 each PRN    losartan (COZAAR) 50 MG tablet Take 1 tablet (50 mg total) by mouth once daily. 90 tablet 0    NIFEdipine (PROCARDIA-XL) 30 MG (OSM) 24 hr tablet Take 1 tablet (30 mg total) by mouth 2 (two) times a day. 180 tablet 0     No current facility-administered medications for this visit.       REVIEW OF SYSTEMS:    GENERAL:  No weight loss, malaise or fevers.  HEENT:  Negative for frequent or significant headaches.  NECK:  Negative for lumps, goiter, pain and significant neck swelling.  RESPIRATORY:  Negative for cough, wheezing or shortness of breath.  CARDIOVASCULAR:  Negative for chest pain, leg swelling or palpitations.  GI:  Negative for abdominal discomfort, blood in stools or black stools or change in bowel habits.  MUSCULOSKELETAL:  See HPI.  SKIN:  Negative for lesions, rash, and itching.  PSYCH:  Negative for sleep disturbance, mood disorder and recent psychosocial stressors.  HEMATOLOGY/LYMPHOLOGY:  Negative for prolonged bleeding, bruising easily or swollen nodes.  NEURO:   No history of headaches, syncope, paralysis, seizures or tremors.  All other reviewed and negative other than HPI.    Past Medical History:  Past Medical History:   Diagnosis Date    Arthritis     Back pain     Cancer     ovarian    Cervical cancer     Depression     Diabetes mellitus     Fibromyalgia     Heart attack     Hyperlipidemia     Hypertension     Lupus     Stroke     slight left sided weakness       Past Surgical History:  Past Surgical History:   Procedure Laterality Date    APPENDECTOMY       SECTION      2    CORONARY ANGIOGRAPHY N/A 2022    Procedure: ANGIOGRAM, CORONARY ARTERY;   Surgeon: Jose L Méndez MD;  Location: [facility] CATH LAB;  Service: Cardiology;  Laterality: N/A;    HYSTERECTOMY      with USO for cervical cancer    INJECTION OF ANESTHETIC AGENT AROUND NERVE Bilateral 2021    Procedure: BLOCK, NERVE, SYMPATHIC;  Surgeon: Holden Pereira MD;  Location: [facility] PAIN MGT;  Service: Pain Management;  Laterality: Bilateral;    INJECTION OF ANESTHETIC AGENT AROUND NERVE N/A 2021    Procedure: BLOCK, NERVE, SYMPATHETIC  need consent;  Surgeon: Holden Pereira MD;  Location: [facility] PAIN MGT;  Service: Pain Management;  Laterality: N/A;    AZ EVAL,SWALLOW FUNCTION,CINE/VIDEO RECORD  2021         TONSILLECTOMY      TRIAL OF SPINAL CORD NERVE STIMULATOR N/A 3/8/2023    Procedure: LUMBAR SPINAL CORD STIMULATOR TRIAL NEVRO REP PATIENT STATES SHE NO LONGER TAKES PLAVIX;  Surgeon: Holden Pereira MD;  Location: [facility] PAIN MGT;  Service: Pain Management;  Laterality: N/A;       Family History:  Family History   Problem Relation Age of Onset    COPD Mother     Lupus Mother     Hernia Mother     Uterine cancer Mother         vs cervical cancer    Ovarian cancer Mother     Diabetes Father     Coronary artery disease Father     Colon cancer Maternal Grandmother         in her 50's       Social History:  Social History     Socioeconomic History    Marital status:    Tobacco Use    Smoking status: Former     Current packs/day: 0.00     Types: Cigarettes     Quit date: 2020     Years since quittin.8     Passive exposure: Past    Smokeless tobacco: Never   Substance and Sexual Activity    Alcohol use: Not Currently     Comment: occasionally    Drug use: Yes     Types: Hydrocodone     Comment: three times a day    Sexual activity: Not Currently   Social History Narrative    Lives with daughter. .      Social Determinants of Health     Financial Resource Strain: Low Risk  (2022)    Overall Financial Resource Strain (CARDIA)     Difficulty of Paying Living  "Expenses: Not hard at all   Food Insecurity: No Food Insecurity (5/5/2022)    Hunger Vital Sign     Worried About Running Out of Food in the Last Year: Never true     Ran Out of Food in the Last Year: Never true   Transportation Needs: No Transportation Needs (5/5/2022)    PRAPARE - Transportation     Lack of Transportation (Medical): No     Lack of Transportation (Non-Medical): No   Physical Activity: Insufficiently Active (5/5/2022)    Exercise Vital Sign     Days of Exercise per Week: 3 days     Minutes of Exercise per Session: 20 min   Stress: No Stress Concern Present (5/5/2022)    [employer] [place] [employer] - Occupational Stress Questionnaire     Feeling of Stress : Not at all   Social Connections: Socially Isolated (5/5/2022)    Social Connection and Isolation Panel [NHANES]     Frequency of Communication with Friends and Family: More than three times a week     Frequency of Social Gatherings with Friends and Family: Never     Attends Oriental orthodox Services: Never     Active Member of Clubs or Organizations: No     Attends Club or Organization Meetings: Never     Marital Status:    Housing Stability: Low Risk  (5/5/2022)    Housing Stability Vital Sign     Unable to Pay for Housing in the Last Year: No     Number of Places Lived in the Last Year: 1     Unstable Housing in the Last Year: No       OBJECTIVE:    BP (!) 149/78 (BP Location: Left arm, Patient Position: Sitting, BP Method: Large (Automatic))   Pulse 85   Resp 18   Ht 5' 1" (1.549 m)   Wt 90.3 kg (199 lb 1.2 oz)   SpO2 100%   BMI 37.62 kg/m²     PHYSICAL EXAMINATION:     General appearance: Well appearing, in no acute distress, alert and oriented x3.  Psych:  Mood and affect appropriate.  Skin: Skin color, texture, turgor normal, no rashes or lesions, in both upper and lower body.  Head/face:  Atraumatic, normocephalic.  Pulm: Symmetric chest rise, no respiratory distress noted.   Musculoskeletal: 5/5 strength in right ankle " with plantar and dorsiflexion. 5/5 strength in left ankle with plantar and dorsiflexion. 4/5 strength with right knee flexion and extension. 4/5 strength with left knee flexion and extension.   Neuro:  Decreased sensation to light touch to BLE.   Gait: Antalgic- ambulates with wheelchair.     ASSESSMENT: 78 y.o. year old female with chronic pain, consistent with:     1. Painful diabetic neuropathy        2. Chronic pain syndrome              PLAN:     - Previous imaging reviewed. Labs reviewed.     - Schedule for repeat Qutenza patches application.     - Continue Robaxin 500 mg TID PRN.    - Continue Lyrica 150 mg TID.     - Continue Norco 10/325 mg TID PRN, #90, refill provided.      - The patient is here today for a refill of current pain medications and they believe these provide effective pain control and improvements in quality of life.  The patient notes no serious side effects, and feels the benefits outweigh the risks.  The patient was reminded of the pain contract that they signed previously as well as the risks and benefits of the medication including possible death.  The updated [place] [employer] prescription monitoring program was reviewed, and the patient has been found to be compliant with current treatment plan. Medication management provided by  in absence of Dr. Pereira.     - UDS from 6/16/2023 reviewed and consistent.     - RTC in 3 months.     The above plan and management options were discussed at length with patient. Patient is in agreement with the above and verbalized understanding.    Elvira Hylton NP  09/15/2023

## 2023-09-20 DIAGNOSIS — I20.89 ANGINA AT REST: ICD-10-CM

## 2023-09-20 DIAGNOSIS — K31.84 GASTROPARESIS: ICD-10-CM

## 2023-09-20 DIAGNOSIS — E11.59 TYPE 2 DIABETES MELLITUS WITH OTHER CIRCULATORY COMPLICATION, WITHOUT LONG-TERM CURRENT USE OF INSULIN: ICD-10-CM

## 2023-09-21 RX ORDER — NITROGLYCERIN 0.4 MG/1
0.4 TABLET SUBLINGUAL EVERY 5 MIN PRN
Qty: 25 TABLET | Refills: 11 | Status: SHIPPED | OUTPATIENT
Start: 2023-09-21

## 2023-09-21 NOTE — TELEPHONE ENCOUNTER
No care due was identified.  [facility] Embedded Care Due Messages. Reference number: [id number].   9/20/2023 9:57:59 PM CDT

## 2023-09-21 NOTE — TELEPHONE ENCOUNTER
Care Due:                  Date            Visit Type   Department     Provider  --------------------------------------------------------------------------------                                ESTABLISHED                              PATIENT -    [facility] INTERNAL  Last Visit: 11-      VIRTUAL      MEDICINE       Chun Zapata                              MYCHART                              FOLLOWUP/OF  [facility] INTERNAL  Next Visit: 10-      FICE VISIT   MEDICINE       Chun Zapata                                                            Last  Test          Frequency    Reason                     Performed    Due Date  --------------------------------------------------------------------------------    CMP.........  12 months..  allopurinoL, fenofibrate.  12- 11-    Lipid Panel.  12 months..  fenofibrate..............  09- 09-    Uric Acid...  12 months..  allopurinoL..............  12- 11-    Health [facility] Embedded Care Due Messages. Reference number: [id number].   9/20/2023 9:56:56 PM CDT

## 2023-09-25 RX ORDER — METOCLOPRAMIDE 5 MG/1
5 TABLET ORAL 4 TIMES DAILY PRN
Qty: 30 TABLET | Refills: 3 | Status: SHIPPED | OUTPATIENT
Start: 2023-09-25 | End: 2023-11-08

## 2023-09-25 RX ORDER — INSULIN ASPART 100 [IU]/ML
12 INJECTION, SOLUTION INTRAVENOUS; SUBCUTANEOUS
Qty: 15 ML | Refills: 3 | Status: SHIPPED | OUTPATIENT
Start: 2023-09-25 | End: 2023-09-29

## 2023-09-25 NOTE — PROGRESS NOTES
Subjective:      Patient ID: Indira Waters is a 78 y.o. female.    Chief Complaint:  No chief complaint on file.    History of Present Illness  Indira Waters is here for follow up of Osteoporosis and DM.  Previously seen by me 2021.  Last seen by Dr Brown 2/2023.  This is a MyChart video visit.    The patient location is: [place]  The chief complaint leading to consultation is: Osteoporosis  Visit type: Virtual visit with synchronous audio and video  Total time spent with patient: see below  Each patient to whom he or she provides medical services by telemedicine is:  (1) informed of the relationship between the physician and patient and the respective role of any other health care provider with respect to management of the patient; and (2) notified that he or she may decline to receive medical services by telemedicine and may withdraw from such care at any time.    With regards to osteoporosis:     BMD:   7/14/2022  The bone mineral density within the lumbar spine measured from L1 through L4 is equal to 0.821 g/cm squared with a T score of -3.0 and a Z score of -1.9.  The left femoral neck bone mineral density is equal to 0.567 g/cm squared with a T score of -3.4 and a Z score of -1.8.  The right femoral neck bone mineral density is equal to 0.546 g/cm squared with a T score of -3.5 and a Z score of -2.0.  The left total hip bone mineral density is equal to 0.612 g/cm squared with a T score of -3.1 and a Z score of -1.8.  The right total hip bone mineral density is equal to 0.624 g/cm squared with a T score of -3.0 and a Z score of -1.7.  There is a 37.0% risk of a major osteoporotic fracture and a 16.7% risk of hip fracture in the next 10 years (FRAX).  Impression:  Osteoporosis    Medications:   Forteo  Start 10/2022  Tolerating well.    Calcium intake: None  Vit D intake: OTC Vit D3 1000iu daily     Weight bearing exercise: None   Falls: Denies any in the last 12 months  Fractures: Denies any in the  last 12 months   Significant height loss (>2 inches): Unsure     Family history: GM, Mother    Tobacco Use: Denies  Alcohol Use: Denies  Glucocorticoid History: Denies   Anticoagulant Use: Aspirin  GERD/PPI Use: Yes  History of Malabsorption: Denies  Antiseizure Medications: Denies  History of Thyroid Disease: Denies  Kidney Disease: Yes  Personal history of kidney stones: in the past   MI:   Stroke:   Dental Visit: Dentures     Lab Results   Component Value Date    CALCIUM 9.7 2023    PHOS 2.6 (L) 2023     Vit D, 25-Hydroxy   Date Value Ref Range Status   2023 26 (L) 30 - 96 ng/mL Final     Comment:     Vitamin D deficiency.........<10 ng/mL                              Vitamin D insufficiency......10-29 ng/mL       Vitamin D sufficiency........> or equal to 30 ng/mL  Vitamin D toxicity............>100 ng/mL         With regards to diabetes:    Diagnosed:   Education - last visit: in the past  Known complications:  DKA 2015  RN +  Eye Exam: 2023  PN +  Podiatry: 2021  Nephropathy +  CAD +  Denies history of pancreatitis & personal/family history of medullary thyroid cancer.     Diet/Exercise:  Eats 3 meals a day.   Snacks : PB/jelly   Drinks : water, milk.  Exercise: None   Recent illness, injury, steroids: Denies     Current regimen:  Levemir 22 units in AM and 18 units in PM   Novolog 10-16 units before meals    Reports compliance.     Other medications tried:  Metformin- stopped for unclear reasons but maybe related to kidney disease  Actos  Insulin 70/30  Januvia     Glucose Monitor:   5 times a day testing  Log reviewed: oral recall  Fastin-300  During the day: 100s    Hypoglycemia:  Yes - before bed   Knows how to correct with 15 grams of carbs- juice, coke, or a peppermint.     Diabetes Management Status    Hemoglobin A1C   Date Value Ref Range Status   2023 10.5 (H) 4.0 - 5.6 % Final     Comment:     ADA Screening Guidelines:  5.7-6.4%  Consistent with  prediabetes  >or=6.5%  Consistent with diabetes    High levels of fetal hemoglobin interfere with the HbA1C  assay. Heterozygous hemoglobin variants (HbS, HgC, etc)do  not significantly interfere with this assay.   However, presence of multiple variants may affect accuracy.     02/03/2023 9.9 (H) 4.0 - 5.6 % Final     Comment:     ADA Screening Guidelines:  5.7-6.4%  Consistent with prediabetes  >or=6.5%  Consistent with diabetes    High levels of fetal hemoglobin interfere with the HbA1C  assay. Heterozygous hemoglobin variants (HbS, HgC, etc)do  not significantly interfere with this assay.   However, presence of multiple variants may affect accuracy.     12/05/2022 10.2 (H) 4.0 - 5.6 % Final     Comment:     ADA Screening Guidelines:  5.7-6.4%  Consistent with prediabetes  >or=6.5%  Consistent with diabetes    High levels of fetal hemoglobin interfere with the HbA1C  assay. Heterozygous hemoglobin variants (HbS, HgC, etc)do  not significantly interfere with this assay.   However, presence of multiple variants may affect accuracy.         Statin: Not taking  ACE/ARB: Taking  Screening or Prevention Patient's value Goal Complete/Controlled?   HgA1C Testing and Control   Lab Results   Component Value Date    HGBA1C 10.5 (H) 05/19/2023      Annually/Less than 8% No   Lipid profile : 09/30/2022 Annually Yes   LDL control Lab Results   Component Value Date    LDLCALC 91.0 09/30/2022    Annually/Less than 100 mg/dl  No   Nephropathy screening Lab Results   Component Value Date    LABMICR 7.0 09/15/2023     Lab Results   Component Value Date    PROTEINUA Negative 12/05/2022    Annually Yes   Blood pressure BP Readings from Last 1 Encounters:   09/15/23 (!) 149/78    Less than 140/90 Yes   Dilated retinal exam : 01/10/2023 Annually Yes   Foot exam   Most Recent Foot Exam Date: Not Found Annually Yes       Review of Systems  As above    There were no vitals taken for this visit.      There is no height or weight on file to  "calculate BMI.    Lab Review:   Lab Results   Component Value Date    HGBA1C 10.5 (H) 05/19/2023    HGBA1C 9.9 (H) 02/03/2023    HGBA1C 10.2 (H) 12/05/2022       Lab Results   Component Value Date    CHOL 171 09/30/2022    HDL 38 (L) 09/30/2022    LDLCALC 91.0 09/30/2022    TRIG 210 (H) 09/30/2022    CHOLHDL 22.2 09/30/2022     Lab Results   Component Value Date     05/19/2023    K 4.0 05/19/2023    CL 97 05/19/2023    CO2 29 05/19/2023     (H) 05/19/2023    BUN 28 (H) 05/19/2023    CREATININE 1.6 (H) 05/19/2023    CALCIUM 9.7 05/19/2023    PROT 7.0 12/05/2022    ALBUMIN 3.7 05/19/2023    BILITOT 0.2 12/05/2022    ALKPHOS 69 12/05/2022    AST 23 12/05/2022    ALT 23 12/05/2022    ANIONGAP 13 05/19/2023    ESTGFRAFRICA 42 (A) 07/14/2022    EGFRNONAA 37 (A) 07/14/2022    TSH 2.432 09/30/2022     Vit D, 25-Hydroxy   Date Value Ref Range Status   05/19/2023 26 (L) 30 - 96 ng/mL Final     Comment:     Vitamin D deficiency.........<10 ng/mL                              Vitamin D insufficiency......10-29 ng/mL       Vitamin D sufficiency........> or equal to 30 ng/mL  Vitamin D toxicity............>100 ng/mL       Assessment and Plan     1. Type 2 diabetes mellitus with hyperglycemia, with long-term current use of insulin  blood-glucose sensor (DEXCOM G7 SENSOR) Nicole    ipratropium-albuteroL (COMBIVENT)  mcg/actuation inhaler    blood-glucose meter,continuous (DEXCOM G7 ) Misc    insulin detemir U-100, Levemir, (LEVEMIR FLEXTOUCH U100 INSULIN) 100 unit/mL (3 mL) InPn pen    insulin aspart U-100 (NOVOLOG FLEXPEN U-100 INSULIN) 100 unit/mL (3 mL) InPn pen    blood-glucose meter kit    lancets Misc    blood sugar diagnostic Strp    BD ULTRA-FINE HIEN PEN NEEDLE 32 gauge x 5/32" Ndle    Hemoglobin A1C      2. Age-related osteoporosis without current pathological fracture  Ambulatory referral/consult to Endocrinology    Vitamin D      3. Type 2 diabetes mellitus with other circulatory complication, " without long-term current use of insulin        4. Vitamin D deficiency        5. Stage 3b chronic kidney disease            Type 2 diabetes mellitus with hyperglycemia  -- Labs prior to follow up.  -- A1c goal < or = 8%.  -- Medications discussed:  MFM - CKD  Insulin   -- Reviewed logs/CGM:  Significant glucose variability.  Priority is to correct for hypoglycemia.  Will begin documenting on a log.  Instructed to send glucose logs in 7-14 days.  Reach out to me sooner for any glucose <70 or consistently >200.  -- Submit for Dexcmo G7 to [facility].  -- Medication Changes:   Levemir 22 units in AM and 15 units in PM   Novolog 10 units plus correction scale before meals only  Add correction scale as needed.  Blood sugar 200 to 250 add 1 units  Blood sugar 251 to 300 add 2 units  Blood sugar greater than 301 add 3 units    -- Reviewed goals of therapy are to get the best control we can without hypoglycemia.  -- Reviewed patient's current insulin regimen. Clarified proper insulin dose and timing in relation to meals, etc. Insulin injection sites and proper rotation instructed.    -- Advised frequent self blood glucose monitoring.  Patient encouraged to document glucose results and bring them to every clinic visit.  -- Hypoglycemia precautions discussed. Instructed on precautions before driving.    -- Call for Bg repeatedly < 90 or > 180.   -- Close adherence to lifestyle changes recommended.   -- Periodic follow ups for eye evaluations, foot care and dental care suggested.    Age-related osteoporosis without current pathological fracture  -- Risks include  race, menopause, history of smoking,  +FH, PPI therapy.  -- Reviewed basics of quantity versus quality.  -- Reassuring they are not fracturing.  -- Recommend:  -Pharmacological therapy is recommended for patients with osteopenia if the 10 year probability of a hip fracture is >3% and 10 year probability of other major osteoporotic fractures is >20%.  Treatment  options and potential side effects discussed for PO bisphosphonates, reclast, Denosumab, and Teriparatide.   -Treatment:   Forteo   Start 10/2022  Complete 2 years then follow up with antiresorptive.  -Calcium and Vitamin D RDD provided.  -Exercise: recommended.  -Fall precautions made in the home.  -Alerted that if dental work needs to be done it should be done prior to initiating therapy.   -- Repeat DEXA scan 7/2024.    Vitamin D deficiency  -- CHANGE OTC Vit D3 2000iu daily.    Stage 3b chronic kidney disease  -- Optimize glucose control.       Follow up in about 3 months (around 12/29/2023).      I spent 40 minutes face-to-face with the patient, over half of the visit was spent on counseling and/or coordinating the care of the patient.    Counseling includes:  Diagnostic results, impressions, recommendations   Prognosis   Risk and benefits of management/treatment options   Instructions for management treatment and or follow-up   Importance of compliance with management   Risk factor reduction   Patient education

## 2023-09-29 ENCOUNTER — OFFICE VISIT (OUTPATIENT)
Dept: ENDOCRINOLOGY | Facility: CLINIC | Age: 78
End: 2023-09-29
Payer: MEDICARE

## 2023-09-29 ENCOUNTER — PATIENT MESSAGE (OUTPATIENT)
Dept: ENDOCRINOLOGY | Facility: CLINIC | Age: 78
End: 2023-09-29

## 2023-09-29 ENCOUNTER — TELEPHONE (OUTPATIENT)
Dept: ENDOCRINOLOGY | Facility: CLINIC | Age: 78
End: 2023-09-29

## 2023-09-29 DIAGNOSIS — E55.9 VITAMIN D DEFICIENCY: ICD-10-CM

## 2023-09-29 DIAGNOSIS — E11.65 TYPE 2 DIABETES MELLITUS WITH HYPERGLYCEMIA, WITH LONG-TERM CURRENT USE OF INSULIN: Chronic | ICD-10-CM

## 2023-09-29 DIAGNOSIS — E11.65 TYPE 2 DIABETES MELLITUS WITH HYPERGLYCEMIA, WITH LONG-TERM CURRENT USE OF INSULIN: Primary | Chronic | ICD-10-CM

## 2023-09-29 DIAGNOSIS — Z79.4 TYPE 2 DIABETES MELLITUS WITH HYPERGLYCEMIA, WITH LONG-TERM CURRENT USE OF INSULIN: Primary | Chronic | ICD-10-CM

## 2023-09-29 DIAGNOSIS — Z79.4 TYPE 2 DIABETES MELLITUS WITH HYPERGLYCEMIA, WITH LONG-TERM CURRENT USE OF INSULIN: Chronic | ICD-10-CM

## 2023-09-29 DIAGNOSIS — N18.32 STAGE 3B CHRONIC KIDNEY DISEASE: ICD-10-CM

## 2023-09-29 DIAGNOSIS — M81.0 AGE-RELATED OSTEOPOROSIS WITHOUT CURRENT PATHOLOGICAL FRACTURE: ICD-10-CM

## 2023-09-29 DIAGNOSIS — E11.59 TYPE 2 DIABETES MELLITUS WITH OTHER CIRCULATORY COMPLICATION, WITHOUT LONG-TERM CURRENT USE OF INSULIN: ICD-10-CM

## 2023-09-29 PROCEDURE — 99214 OFFICE O/P EST MOD 30 MIN: CPT | Mod: 95,,, | Performed by: NURSE PRACTITIONER

## 2023-09-29 PROCEDURE — 99214 PR OFFICE/OUTPT VISIT, EST, LEVL IV, 30-39 MIN: ICD-10-PCS | Mod: 95,,, | Performed by: NURSE PRACTITIONER

## 2023-09-29 RX ORDER — LANCETS
EACH MISCELLANEOUS
Qty: 200 EACH | Refills: 11 | Status: SHIPPED | OUTPATIENT
Start: 2023-09-29

## 2023-09-29 RX ORDER — PEN NEEDLE, DIABETIC 31 GX5/16"
NEEDLE, DISPOSABLE MISCELLANEOUS
Qty: 450 EACH | Refills: 3 | Status: SHIPPED | OUTPATIENT
Start: 2023-09-29 | End: 2023-11-13 | Stop reason: SDUPTHER

## 2023-09-29 RX ORDER — INSULIN PUMP SYRINGE, 3 ML
EACH MISCELLANEOUS
Qty: 1 EACH | Refills: 0 | Status: SHIPPED | OUTPATIENT
Start: 2023-09-29 | End: 2023-09-29 | Stop reason: SDUPTHER

## 2023-09-29 RX ORDER — BLOOD-GLUCOSE,RECEIVER,CONT
EACH MISCELLANEOUS
Qty: 1 EACH | Refills: 0 | Status: SHIPPED | OUTPATIENT
Start: 2023-09-29

## 2023-09-29 RX ORDER — BLOOD-GLUCOSE SENSOR
EACH MISCELLANEOUS
Qty: 3 EACH | Refills: 11 | Status: SHIPPED | OUTPATIENT
Start: 2023-09-29 | End: 2023-09-29 | Stop reason: SDUPTHER

## 2023-09-29 RX ORDER — BLOOD-GLUCOSE,RECEIVER,CONT
EACH MISCELLANEOUS
Qty: 1 EACH | Refills: 0 | Status: SHIPPED | OUTPATIENT
Start: 2023-09-29 | End: 2023-09-29 | Stop reason: SDUPTHER

## 2023-09-29 RX ORDER — BLOOD-GLUCOSE SENSOR
EACH MISCELLANEOUS
Qty: 3 EACH | Refills: 11 | Status: SHIPPED | OUTPATIENT
Start: 2023-09-29 | End: 2024-02-29 | Stop reason: SDUPTHER

## 2023-09-29 RX ORDER — LANCETS
EACH MISCELLANEOUS
Qty: 200 EACH | Refills: 11 | Status: SHIPPED | OUTPATIENT
Start: 2023-09-29 | End: 2023-09-29 | Stop reason: SDUPTHER

## 2023-09-29 RX ORDER — INSULIN PUMP SYRINGE, 3 ML
EACH MISCELLANEOUS
Qty: 1 EACH | Refills: 0 | Status: SHIPPED | OUTPATIENT
Start: 2023-09-29

## 2023-09-29 RX ORDER — INSULIN ASPART 100 [IU]/ML
10 INJECTION, SOLUTION INTRAVENOUS; SUBCUTANEOUS
Qty: 30 ML | Refills: 3 | Status: SHIPPED | OUTPATIENT
Start: 2023-09-29 | End: 2023-11-13 | Stop reason: SDUPTHER

## 2023-09-29 RX ORDER — INSULIN DETEMIR 100 [IU]/ML
INJECTION, SOLUTION SUBCUTANEOUS
Qty: 30 ML | Refills: 3 | Status: SHIPPED | OUTPATIENT
Start: 2023-09-29 | End: 2023-11-13 | Stop reason: SDUPTHER

## 2023-09-29 NOTE — Clinical Note
Sugar clinic in 6 weeks Virtual visit in 12 weeks with labs prior Orders Placed This Encounter     Hemoglobin A1C     Vitamin D

## 2023-09-29 NOTE — ASSESSMENT & PLAN NOTE
-- Labs prior to follow up.  -- A1c goal < or = 8%.  -- Medications discussed:  MFM - CKD  Insulin   -- Reviewed logs/CGM:  Significant glucose variability.  Priority is to correct for hypoglycemia.  Will begin documenting on a log.  Instructed to send glucose logs in 7-14 days.  Reach out to me sooner for any glucose <70 or consistently >200.  -- Submit for Dexcmo G7 to [facility].  -- Medication Changes:   Levemir 22 units in AM and 15 units in PM   Novolog 10 units plus correction scale before meals only  Add correction scale as needed.  Blood sugar 200 to 250 add 1 units  Blood sugar 251 to 300 add 2 units  Blood sugar greater than 301 add 3 units    -- Reviewed goals of therapy are to get the best control we can without hypoglycemia.  -- Reviewed patient's current insulin regimen. Clarified proper insulin dose and timing in relation to meals, etc. Insulin injection sites and proper rotation instructed.    -- Advised frequent self blood glucose monitoring.  Patient encouraged to document glucose results and bring them to every clinic visit.  -- Hypoglycemia precautions discussed. Instructed on precautions before driving.    -- Call for Bg repeatedly < 90 or > 180.   -- Close adherence to lifestyle changes recommended.   -- Periodic follow ups for eye evaluations, foot care and dental care suggested.

## 2023-09-29 NOTE — ASSESSMENT & PLAN NOTE
-- Risks include  race, menopause, history of smoking,  +FH, PPI therapy.  -- Reviewed basics of quantity versus quality.  -- Reassuring they are not fracturing.  -- Recommend:  -Pharmacological therapy is recommended for patients with osteopenia if the 10 year probability of a hip fracture is >3% and 10 year probability of other major osteoporotic fractures is >20%.  Treatment options and potential side effects discussed for PO bisphosphonates, reclast, Denosumab, and Teriparatide.   -Treatment:   Forteo   Start 10/2022  Complete 2 years then follow up with antiresorptive.  -Calcium and Vitamin D RDD provided.  -Exercise: recommended.  -Fall precautions made in the home.  -Alerted that if dental work needs to be done it should be done prior to initiating therapy.   -- Repeat DEXA scan 7/2024.

## 2023-10-11 ENCOUNTER — OFFICE VISIT (OUTPATIENT)
Dept: PAIN MEDICINE | Facility: CLINIC | Age: 78
End: 2023-10-11
Payer: MEDICARE

## 2023-10-11 VITALS
HEIGHT: 61 IN | HEART RATE: 80 BPM | OXYGEN SATURATION: 100 % | SYSTOLIC BLOOD PRESSURE: 145 MMHG | DIASTOLIC BLOOD PRESSURE: 69 MMHG | TEMPERATURE: 98 F | BODY MASS INDEX: 37.62 KG/M2 | RESPIRATION RATE: 18 BRPM

## 2023-10-11 DIAGNOSIS — G89.4 CHRONIC PAIN SYNDROME: Primary | ICD-10-CM

## 2023-10-11 DIAGNOSIS — E11.40 PAINFUL DIABETIC NEUROPATHY: ICD-10-CM

## 2023-10-11 PROCEDURE — 99999 PR PBB SHADOW E&M-EST. PATIENT-LVL V: ICD-10-PCS | Mod: PBBFAC,,, | Performed by: NURSE PRACTITIONER

## 2023-10-11 PROCEDURE — 64999 UNLISTED PX NERVOUS SYSTEM: CPT | Mod: S$PBB,,, | Performed by: NURSE PRACTITIONER

## 2023-10-11 PROCEDURE — 99215 OFFICE O/P EST HI 40 MIN: CPT | Mod: PBBFAC | Performed by: NURSE PRACTITIONER

## 2023-10-11 PROCEDURE — 99999 PR PBB SHADOW E&M-EST. PATIENT-LVL V: CPT | Mod: PBBFAC,,, | Performed by: NURSE PRACTITIONER

## 2023-10-11 PROCEDURE — 64999 PR QUTENZA APPLICATION: ICD-10-PCS | Mod: S$PBB,,, | Performed by: NURSE PRACTITIONER

## 2023-10-11 PROCEDURE — 99499 NO LOS: ICD-10-PCS | Mod: S$PBB,,, | Performed by: NURSE PRACTITIONER

## 2023-10-11 PROCEDURE — 64999 UNLISTED PX NERVOUS SYSTEM: CPT | Mod: PBBFAC | Performed by: NURSE PRACTITIONER

## 2023-10-11 PROCEDURE — 99499 UNLISTED E&M SERVICE: CPT | Mod: S$PBB,,, | Performed by: NURSE PRACTITIONER

## 2023-10-11 NOTE — PROGRESS NOTES
Chronic patient Established Note (Follow up visit)        Interval History 9/15/2023:  The patient returns to clinic today for follow up of back and leg pain. She did have benefit with Qutenza patches. She continues to report low back pain with intermittent radicular leg pain. She continues to report neuropathic pain into her feet. She continues to take Lyrica and Robaxin with benefit. She also takes Norco as needed with benefit. She denies any adverse effects. She denies any other health changes. Her pain today is 9/10.    Interval History 6/27/2023:  The patient returns to clinic today for follow up of pain. She continues to report nerve pain to her feet bilaterally. This pain is worse with standing and walking. She is taking Lyrica and Robaxin. She also takes Norco as needed with benefit and without adverse effects. She denies any other health changes. Her pain today is 9/10.    Interval History 6/16/2023:  The patient returns to clinic today for follow up of back and leg pain. She continues to report low back pain. She also reports diabetic neuropathy to her bilateral feet. Her pain is worse with standing and walking. She has tried Qutenza patches with benefit in the past. She continues to take Robaxin and Lyrica. She also takes Norco as needed with benefit and without adverse effects. She denies any other health changes. Her pain today is 10/10.    Interval History 3/17/2023:  Mrs Waters presents for follow up of chronic, continuous neuropathic pain to [facility]. She is s/p Nevro SCS trial and unfortunately she did not get the relief she was hoping for and 50% at best relief but this was not consistent. She has no constitutional s/s concerning for infection and denies newer neurological changes since trial. She continues with medication mgt and states Qutenza application has been providing significant benefit.     Interval History 2/10/2023:  Mrs Waters presents for follow up of chronic lower back painn and radicular  pain in conjunction with PDN to bilateral feet. She is doing fair with medication mgt of Norco, Robaxin, and Lyrica and does need refill at this time. She denies SE of medications. She is also here for Qutenza application today. She is scheduled to have upcoming SCS trial with [facility] to aslo address her PDN.     Interval History 12/13/2022:  Mrs Waters presents for follow up of chronic pain. She is ready to repeat Qutenza application as it has been >91 days and she had significant improvement of symptoms of PDN. She recently had repeat A1C and just above 10.0 but is decreasing. She continues to take medication with benefit and denies SE of medication. Medication regimen include Norco 10/325mg TID, Robaxin 500mg and Lyrica 150mg.     Interval History 10/12/2022:  Mrs Waters presents for follow up of chronic neuropathy. She is s/p qutenza patch placement with improved functioning and neuropathy control. Unfortunately her A1C was above 11 which inhibits her from Nevro SCS trial at this time. She is eager to have SCS trial. She denies new areas of pain or neurological changes. She continued to take  Norco 10/325mg TID, Robaxin 500mg and Lyrica 150mg TID with benefit and denies significant SE of medications.     Interval History 9/8/2022:  Mrs Waters presents for follow up of chronic lower back painn and radicular pain in conjunction with PDN to bilateral feet. She is doing fair with medication mgt of Norco, Robaxin, and Lyrica and does need refill at this time. She denies SE of medications. She is patiently awaiting her A1C to become lower than 10 to proceed with SCS trial.     Interval History 8/12/2022:  Mrs Waters presents for delayed FU. She has had continuous pain to lumbar and radicular pain but also peripheral neuropathy complaints. Over interval she has had CVA but doing fair. Her A1C has unfortunately elevated above 10 at this time and SCS trial placed on hold but phychiatric evaluation complete. She continues to  take Norco 10/325mg TID, Robaxin 500mg TID and Lyrica 150mg TID with some benefit and denies SE of medications. No s/s concerning for cauda equina.     Interval History 4/12/2022:  Patient returns to clinic today for follow-up. Her neuropathic pain continues to be an issue for her, but she says that she is able to manage. She is taking Norco 10-325mg TID, Robaxin 500mg TID, and Lyrica 150mg TID without any adverse side effects. She denies any changes in her symptoms. She would like to proceed with SCS trial. Discussed last time that her HbA1c should be <10, was 9.8 on most recent labs. Seen by psychology and cleared for SCS.     Interval History 2/14/2022:  Mrs Waters presents for follow up. Pt states overall neuropathy continues. Since last visit she has been stable with medication mgt and takign Norco 10/325mg TID, Robaxin 500mg TID and Lyrica 150mg TID. She denies new areas of pain or neurological changes. Medication adequate to control pain without adverse SE.      Interval History 12/21/2021:  Pt presents for urgent evaluation s/p fall in kitchen last night. Pt unsure of LOC or head trauma but states some amnesia of events. Pt is having left knee pain, B ankle pain L>R and lower back/buttock pain. Daughter further states tremor like activity last night. During visit daughter asked for bedside commode due to inability to ambulate.  Pt during visit appeared somnolent, pale and began vomiting. Discussed going to ER for further evaluation.      Interval History 12/14/2021:  Mrs Waters presents for follow up of chronic pain complaints. She is hopeful she will have A1C lower soon to move forward with SCS. She is doing fair with medication mgt alone at this time and denies SE of medications. Pt is currently taking Norco 10/325mg TID PRN, Lyrica 150mg TID, and Robaxin 500mg TID. She denies new areas of pain or neurological changes.      Interval History 10/14/2021:  The Pt presents for follow up and s/p B Lumbar  sympathetic blocks. Pt states this has done well but ready to proceed with SCS trial. She is aware A1C must be under tight control and Pt and daughter re-iterate knowing this. Pt also mentions DKA admission and diagnosis of vasculitis as well over interval. Pt continues to take hydrocodone 10/325 mg TID PRN, Lyrica 150 mg TID, and Robaxin 500mg TID. She denies any SE of medication, denies new neurological changes.      Interval Hx 09/16/2021  Indira Waters presents to the clinic for a follow-up appointment for f/u after bilateral lumbar sympathetic block on 8/25/21.Patient reports >50% relief after lumbar sympathetic block that lasted 2 weeks when she then developed a UTI/DKA, was admitted to the hospital and pain recurred.  Current pain intensity is 8/10.     Interval History 8/12/2021:  Indira Waters presents to the clinic for a follow-up appointment for BLE diabetic neuropathy, painful. Continues with Norco 10/325mg, Lyrica 150mg TID, Robaxin 750mg daily PRN. She had to cancel previously scheduled lumbar sympathetic block 2/2 lithotripsy for painful kidney stones, which have now passed. She still has intermittent pain with urination but overall that pain is improving. She had to cancel previous angiogram for this same reason - this has not been rescheduled yet. She denies any change in her LE pain. Denies any new neurological sxs in BLEs.     Interval History 6/10/21:  Indira Waters presents to the clinic for a 2 week follow-up appointment from lumbar sympathetic nerve block in early May. She reports minimal relief from this intervention, but did note that it helped some, briefly. Since the last visit, Indira Waters states the pain has been persistant. Current pain intensity is 10/10. Patient has chronic generalized diffuse pain, most pronounced in her BL lower extremities 2/2 diabetic neuropathy. HSe states that the pain is a little better than at her last visit. Most days are 10/10, but some days  "are better than others. Her pain is aggravated by exertion, walking, or sitting/standing for extended periods of time. He pain is mildly improved by rest and medications. She is currently taking Lyrica 150 PO TID, Zoloft, and Norco 10-325mg TID PRN. She states that the pain meds are helping but she is still constantly in pain and unable to participate in her ADLs. She has now established care with PCP for assistance w/ poorly controlled T2DM, last A1c 11.4. She has not yet established care w/ Rheumatology for fibromyalgia management.    Interval HPI 4/15/21:  Patient returns for follow up of chronic generalized diffuse pain. At the last visit, had EMG (not yet completed), lumbar and hip x-ray imaging ordered. She was also referred to Rheumatology for fibromyalgia management and referral to PCP for DM2 management and weaning of zoloft for transition to cymbalta for treatment of neuropathy. She had her Lyrica increased to 150mg PO TID, and prescribed Norco 10mg TID with opioid contract signed. She has been taking Norco 10mg TID and it has been helping her "bad" pains but does not take all of her pain away. She has not yet been set up with her new PCP or rheumatologist yet for fibromyalgia. Still continues to have generalized pain everywhere including feet, hips, legs, lower and upper back, neck and shoulder pain. Continues to have neuropathy in the bilateral lower extremities due to uncontrolled DM2.    Initial Visit 3/11/21:  Indira Waters presents to the clinic for the evaluation of chronic pain. Complaining of pain everywhere including feet, legs, hips, lower and upper back, neck, shoulder. Pain started 5+ years ago. Pain 10/10 at worse. She was referred here by her PCP Dr. Ramos who she has been seeing for over 6 years. She states her pain is aggravated by any physical activity/movement. She takes lyrica, robaxin, and Norco 10 TID with mild relief of pain. Per patient, she was referred here by her PCP for " medication refill. She has not tried physical therapy for several years, she states it did not help last time she was in PT. She says she is trying to exercise daily by doing yoga. She walks with a walker. She has numerous comorbidities including DM2 (Last A1C 9+ per prior family medicine note in Jan 2021), fibromyalgia, lupus, DDD. She states she would like to find a new PCP as she no longer lives near her old one. Patient denies night fever/night sweats, urinary incontinence, bowel incontinence and significant weight loss.      Pain Disability Index Review:      10/11/2023     2:34 PM 9/15/2023     2:03 PM 6/16/2023    11:37 AM   Last 3 PDI Scores   Pain Disability Index (PDI) 40 43 41     Pain Medications:  Norco 10-325mg TID, Robaxin 500mg TID, and Lyrica 150mg TID    Opioid Contract: yes     report:  Reviewed and consistent with medication use as prescribed.    Pain Procedures:    - reports possible back injection 10+ years ago  - Lumbar sympathetic nerve block 05/05/21 - Dr. Pereira - minimal relief    Physical Therapy/Home Exercise: no     Imaging:   Hip X-ray 4/7/21  FINDINGS:  Osseous structures appear intact without evidence of fracture or osseous destructive process.  No apparent dislocation.     Modest degenerative change or significant joint space narrowing.     Lumbar X-ray 4/7/21  FINDINGS:  Diffuse bony demineralization.  Vertebral bodies are normal in height without evidence of fracture.  No pars defects.     Normal sagittal alignment is preserved.  No spondylolisthesis. No abnormal translation with flexion and extension.     Intervertebral disc heights are well maintained.  Mild facet arthropathy in the lower lumbar spine.     Mild scattered vascular calcification.     Impression:     No evidence of fracture or malalignment.     Mild facet arthropathy in the lower lumbar spine.     Diffuse bony demineralization.  Consider correlation with DEXA.    Allergies:   Review of patient's allergies  "indicates:   Allergen Reactions    Bleach (sodium hypochlorite) Shortness Of Breath    Nitrofurantoin macrocrystalline Anaphylaxis    Lipitor [atorvastatin] Diarrhea and Rash    Nsaids (non-steroidal anti-inflammatory drug)     Pcn [penicillins]     Toradol [ketorolac]        Current Medications:   Current Outpatient Medications   Medication Sig Dispense Refill    acetaminophen (TYLENOL) 500 MG tablet Take 2 tablets (1,000 mg total) by mouth every 8 (eight) hours as needed for Pain.  0    allopurinoL (ZYLOPRIM) 300 MG tablet Take 1 tablet (300 mg total) by mouth once daily. 90 tablet 1    aspirin (ECOTRIN) 81 MG EC tablet Take 1 tablet (81 mg total) by mouth once daily. 30 tablet 0    BD ULTRA-FINE HIEN PEN NEEDLE 32 gauge x 5/32" Ndle Use with insulin 5 times a day. 450 each 3    blood sugar diagnostic Strp To check BG 6 times daily, to use with insurance preferred meter 200 each 11    blood-glucose meter kit To check BG 6 times daily, to use with insurance preferred meter 1 each 0    blood-glucose meter,continuous (DEXCOM G7 ) Misc Use as directed. 1 each 0    blood-glucose sensor (DEXCOM G7 SENSOR) Nicole Change every 10 days. 3 each 11    cloNIDine (CATAPRES) 0.1 MG tablet Take 1 tablet (0.1 mg total) by mouth 2 (two) times daily. 180 tablet 0    evolocumab (REPATHA SURECLICK) 140 mg/mL PnIj Inject 1 mL (140 mg total) into the skin every 14 (fourteen) days. 8 each 0    fenofibrate micronized (LOFIBRA) 134 MG Cap Take 1 capsule (134 mg total) by mouth daily with breakfast. 90 capsule 3    furosemide (LASIX) 40 MG tablet TAKE ONE TABLET BY MOUTH EVERY DAY 90 tablet 1    HYDROcodone-acetaminophen (NORCO)  mg per tablet Take 1 tablet by mouth every 8 (eight) hours as needed for Pain. 90 tablet 0    hydrocortisone (ANUSOL-HC) 2.5 % rectal cream Procto-Med HC 2.5 % topical cream perineal applicator      insulin aspart U-100 (NOVOLOG FLEXPEN U-100 INSULIN) 100 unit/mL (3 mL) InPn pen Inject 10 Units into " the skin 3 (three) times daily with meals. + meal correction scale. Max 40 units 30 mL 3    insulin detemir U-100, Levemir, (LEVEMIR FLEXTOUCH U100 INSULIN) 100 unit/mL (3 mL) InPn pen Inject 22 Units into the skin once daily AND 15 Units every evening. 30 mL 3    ipratropium-albuteroL (COMBIVENT)  mcg/actuation inhaler Inhale 1 puff into the lungs by mouth every 6 (six) hours. 4 g 0    lancets Misc To check BG 6 times daily, to use with insurance preferred meter 200 each 11    methocarbamoL (ROBAXIN) 500 MG Tab Take 1 tablet (500 mg total) by mouth 3 (three) times daily as needed (muscle spasm). 90 tablet 2    metoclopramide HCl (REGLAN) 5 MG tablet Take 1 tablet (5 mg total) by mouth 4 (four) times daily as needed (nausea, prevention). 30 tablet 3    metoprolol succinate (TOPROL-XL) 100 MG 24 hr tablet Take 1 tablet (100 mg total) by mouth once daily. 90 tablet 3    nitroGLYCERIN (NITROSTAT) 0.4 MG SL tablet Place 1 tablet (0.4 mg total) under the tongue every 5 (five) minutes as needed for Chest pain. 25 tablet 11    pantoprazole (PROTONIX) 40 MG tablet Take 1 tablet (40 mg total) by mouth once daily. 30 tablet 0    pregabalin (LYRICA) 150 MG capsule TAKE ONE CAPSULE BY MOUTH 3 TIMES DAILY 90 capsule 2    senna-docusate 8.6-50 mg (PERICOLACE) 8.6-50 mg per tablet Take 1 tablet by mouth 2 (two) times daily as needed for Constipation. 60 tablet 0    sertraline (ZOLOFT) 100 MG tablet Take 1 tablet (100 mg total) by mouth once daily. 90 tablet 3    sulfamethoxazole-trimethoprim 800-160mg (BACTRIM DS) 800-160 mg Tab sulfamethoxazole 800 mg-trimethoprim 160 mg tablet      teriparatide 20 mcg/dose (620mcg/2.48mL) PnIj Inject 20 mcg into the skin once daily. 2.48 mL 1    triamcinolone acetonide 0.1% (KENALOG) 0.1 % cream Apply to affected areas of body twice daily as needed rash. Do not use on face, underarms, or groin. 454 g 0    losartan (COZAAR) 50 MG tablet Take 1 tablet (50 mg total) by mouth once daily. 90  tablet 0    NIFEdipine (PROCARDIA-XL) 30 MG (OSM) 24 hr tablet Take 1 tablet (30 mg total) by mouth 2 (two) times a day. 180 tablet 0     No current facility-administered medications for this visit.       REVIEW OF SYSTEMS:    GENERAL:  No weight loss, malaise or fevers.  HEENT:  Negative for frequent or significant headaches.  NECK:  Negative for lumps, goiter, pain and significant neck swelling.  RESPIRATORY:  Negative for cough, wheezing or shortness of breath.  CARDIOVASCULAR:  Negative for chest pain, leg swelling or palpitations.  GI:  Negative for abdominal discomfort, blood in stools or black stools or change in bowel habits.  MUSCULOSKELETAL:  See HPI.  SKIN:  Negative for lesions, rash, and itching.  PSYCH:  Negative for sleep disturbance, mood disorder and recent psychosocial stressors.  HEMATOLOGY/LYMPHOLOGY:  Negative for prolonged bleeding, bruising easily or swollen nodes.  NEURO:   No history of headaches, syncope, paralysis, seizures or tremors.  All other reviewed and negative other than HPI.    Past Medical History:  Past Medical History:   Diagnosis Date    Arthritis     Back pain     Cancer     ovarian    Cervical cancer     Depression     Diabetes mellitus     Fibromyalgia     Heart attack     Hyperlipidemia     Hypertension     Lupus     Stroke     slight left sided weakness       Past Surgical History:  Past Surgical History:   Procedure Laterality Date    APPENDECTOMY       SECTION      2    CORONARY ANGIOGRAPHY N/A 2022    Procedure: ANGIOGRAM, CORONARY ARTERY;  Surgeon: Jose L Méndez MD;  Location: [facility], operated by [facility] CATH LAB;  Service: Cardiology;  Laterality: N/A;    HYSTERECTOMY      with USO for cervical cancer    INJECTION OF ANESTHETIC AGENT AROUND NERVE Bilateral 2021    Procedure: BLOCK, NERVE, SYMPATHIC;  Surgeon: Holden Pereira MD;  Location: [facility], operated by [facility] PAIN MGT;  Service: Pain Management;  Laterality: Bilateral;    INJECTION OF ANESTHETIC AGENT AROUND NERVE N/A 2021     Procedure: BLOCK, NERVE, SYMPATHETIC  need consent;  Surgeon: Holden Pereira MD;  Location: [facility] PAIN MGT;  Service: Pain Management;  Laterality: N/A;    YARED LINK,SWALLOW FUNCTION,CINE/VIDEO RECORD  2021         TONSILLECTOMY      TRIAL OF SPINAL CORD NERVE STIMULATOR N/A 3/8/2023    Procedure: LUMBAR SPINAL CORD STIMULATOR TRIAL NEVRO REP PATIENT STATES SHE NO LONGER TAKES PLAVIX;  Surgeon: Holden Pereira MD;  Location: [facility] PAIN MGT;  Service: Pain Management;  Laterality: N/A;       Family History:  Family History   Problem Relation Age of Onset    COPD Mother     Lupus Mother     Hernia Mother     Uterine cancer Mother         vs cervical cancer    Ovarian cancer Mother     Diabetes Father     Coronary artery disease Father     Colon cancer Maternal Grandmother         in her 50's       Social History:  Social History     Socioeconomic History    Marital status:    Tobacco Use    Smoking status: Former     Current packs/day: 0.00     Types: Cigarettes     Quit date: 2020     Years since quittin.9     Passive exposure: Past    Smokeless tobacco: Never   Substance and Sexual Activity    Alcohol use: Not Currently     Comment: occasionally    Drug use: Yes     Types: Hydrocodone     Comment: three times a day    Sexual activity: Not Currently   Social History Narrative    Lives with daughter. .      Social Determinants of Health     Financial Resource Strain: Low Risk  (2022)    Overall Financial Resource Strain (CARDIA)     Difficulty of Paying Living Expenses: Not hard at all   Food Insecurity: No Food Insecurity (2022)    Hunger Vital Sign     Worried About Running Out of Food in the Last Year: Never true     Ran Out of Food in the Last Year: Never true   Transportation Needs: No Transportation Needs (2022)    PRAPARE - Transportation     Lack of Transportation (Medical): No     Lack of Transportation (Non-Medical): No   Physical Activity: Insufficiently Active  "(5/5/2022)    Exercise Vital Sign     Days of Exercise per Week: 3 days     Minutes of Exercise per Session: 20 min   Stress: No Stress Concern Present (5/5/2022)    [employer] [place] [employer] - Occupational Stress Questionnaire     Feeling of Stress : Not at all   Social Connections: Socially Isolated (5/5/2022)    Social Connection and Isolation Panel [NHANES]     Frequency of Communication with Friends and Family: More than three times a week     Frequency of Social Gatherings with Friends and Family: Never     Attends Buddhism Services: Never     Active Member of Clubs or Organizations: No     Attends Club or Organization Meetings: Never     Marital Status:    Housing Stability: Low Risk  (5/5/2022)    Housing Stability Vital Sign     Unable to Pay for Housing in the Last Year: No     Number of Places Lived in the Last Year: 1     Unstable Housing in the Last Year: No       OBJECTIVE:    BP (!) 145/69 (BP Location: Left arm, Patient Position: Sitting, BP Method: Large (Automatic))   Pulse 80   Temp 97.5 °F (36.4 °C)   Resp 18   Ht 5' 1" (1.549 m)   SpO2 100%   BMI 37.62 kg/m²     PHYSICAL EXAMINATION:     General appearance: Well appearing, in no acute distress, alert and oriented x3.  Psych:  Mood and affect appropriate.  Skin: Skin color, texture, turgor normal, no rashes or lesions, in both upper and lower body.  Head/face:  Atraumatic, normocephalic.  Pulm: Symmetric chest rise, no respiratory distress noted.   Musculoskeletal: 5/5 strength in right ankle with plantar and dorsiflexion. 5/5 strength in left ankle with plantar and dorsiflexion. 4/5 strength with right knee flexion and extension. 4/5 strength with left knee flexion and extension.   Neuro:  Decreased sensation to light touch to BLE.   Gait: Antalgic- ambulates with wheelchair.     ASSESSMENT: 78 y.o. year old female with chronic pain, consistent with:     No diagnosis found.        PLAN:     - Previous imaging " reviewed. Labs reviewed.     - Schedule for repeat Qutenza patches application.     - Continue Robaxin 500 mg TID PRN.    - Continue Lyrica 150 mg TID.     - Continue Norco 10/325 mg TID PRN, #90, refill provided.      - The patient is here today for a refill of current pain medications and they believe these provide effective pain control and improvements in quality of life.  The patient notes no serious side effects, and feels the benefits outweigh the risks.  The patient was reminded of the pain contract that they signed previously as well as the risks and benefits of the medication including possible death.  The updated [place] Board of [employer] prescription monitoring program was reviewed, and the patient has been found to be compliant with current treatment plan. Medication management provided by  in absence of Dr. Pereira.     - UDS from 6/16/2023 reviewed and consistent.     - RTC in 3 months.     The above plan and management options were discussed at length with patient. Patient is in agreement with the above and verbalized understanding.    Elvira Hylton NP  10/11/2023

## 2023-10-12 DIAGNOSIS — G89.4 CHRONIC PAIN DISORDER: ICD-10-CM

## 2023-10-12 DIAGNOSIS — G89.4 CHRONIC PAIN SYNDROME: ICD-10-CM

## 2023-10-12 DIAGNOSIS — E08.42 DIABETIC POLYNEUROPATHY ASSOCIATED WITH DIABETES MELLITUS DUE TO UNDERLYING CONDITION: ICD-10-CM

## 2023-10-12 PROCEDURE — 99999PBSHW PR PBB SHADOW TECHNICAL ONLY FILED TO HB: ICD-10-PCS | Mod: PBBFAC,,,

## 2023-10-12 PROCEDURE — 99999PBSHW PR PBB SHADOW TECHNICAL ONLY FILED TO HB: Mod: PBBFAC,,,

## 2023-10-12 RX ORDER — HYDROCODONE BITARTRATE AND ACETAMINOPHEN 10; 325 MG/1; MG/1
1 TABLET ORAL EVERY 8 HOURS PRN
Qty: 90 TABLET | Refills: 0 | Status: SHIPPED | OUTPATIENT
Start: 2023-10-14 | End: 2023-11-10 | Stop reason: SDUPTHER

## 2023-10-12 RX ADMIN — CAPSAICIN 1 PATCH: KIT at 09:10

## 2023-10-12 NOTE — PROGRESS NOTES
Chronic patient Established Note (Follow up visit)        Interval History 10/11/2023:  The patient returns to clinic today for follow up of bilateral foot pain. She continues to report neuropathic pain to her feet. Her pain is worse with standing and walking. She does have benefit with Qutenza patches. She also takes Lyrica, Robaxin, and Norco. She denies any adverse effects. She denies any other health changes. Her pain today is 8/10.    Interval History 9/15/2023:  The patient returns to clinic today for follow up of back and leg pain. She did have benefit with Qutenza patches. She continues to report low back pain with intermittent radicular leg pain. She continues to report neuropathic pain into her feet. She continues to take Lyrica and Robaxin with benefit. She also takes Norco as needed with benefit. She denies any adverse effects. She denies any other health changes. Her pain today is 9/10.    Interval History 6/27/2023:  The patient returns to clinic today for follow up of pain. She continues to report nerve pain to her feet bilaterally. This pain is worse with standing and walking. She is taking Lyrica and Robaxin. She also takes Norco as needed with benefit and without adverse effects. She denies any other health changes. Her pain today is 9/10.    Interval History 6/16/2023:  The patient returns to clinic today for follow up of back and leg pain. She continues to report low back pain. She also reports diabetic neuropathy to her bilateral feet. Her pain is worse with standing and walking. She has tried Qutenza patches with benefit in the past. She continues to take Robaxin and Lyrica. She also takes Norco as needed with benefit and without adverse effects. She denies any other health changes. Her pain today is 10/10.    Interval History 3/17/2023:  Mrs Waters presents for follow up of chronic, continuous neuropathic pain to [facility]. She is s/p Nevro SCS trial and unfortunately she did not get the relief she  was hoping for and 50% at best relief but this was not consistent. She has no constitutional s/s concerning for infection and denies newer neurological changes since trial. She continues with medication mgt and states Qutenza application has been providing significant benefit.     Interval History 2/10/2023:  Mrs Waters presents for follow up of chronic lower back painn and radicular pain in conjunction with PDN to bilateral feet. She is doing fair with medication mgt of Norco, Robaxin, and Lyrica and does need refill at this time. She denies SE of medications. She is also here for Qutenza application today. She is scheduled to have upcoming SCS trial with [facility] to aslo address her PDN.     Interval History 12/13/2022:  Mrs Waters presents for follow up of chronic pain. She is ready to repeat Qutenza application as it has been >91 days and she had significant improvement of symptoms of PDN. She recently had repeat A1C and just above 10.0 but is decreasing. She continues to take medication with benefit and denies SE of medication. Medication regimen include Norco 10/325mg TID, Robaxin 500mg and Lyrica 150mg.     Interval History 10/12/2022:  Mrs Waters presents for follow up of chronic neuropathy. She is s/p qutenza patch placement with improved functioning and neuropathy control. Unfortunately her A1C was above 11 which inhibits her from Nevro SCS trial at this time. She is eager to have SCS trial. She denies new areas of pain or neurological changes. She continued to take  Norco 10/325mg TID, Robaxin 500mg and Lyrica 150mg TID with benefit and denies significant SE of medications.     Interval History 9/8/2022:  Mrs Waters presents for follow up of chronic lower back painn and radicular pain in conjunction with PDN to bilateral feet. She is doing fair with medication mgt of Norco, Robaxin, and Lyrica and does need refill at this time. She denies SE of medications. She is patiently awaiting her A1C to become lower than  10 to proceed with SCS trial.     Interval History 8/12/2022:  Mrs Waters presents for delayed FU. She has had continuous pain to lumbar and radicular pain but also peripheral neuropathy complaints. Over interval she has had CVA but doing fair. Her A1C has unfortunately elevated above 10 at this time and SCS trial placed on hold but phychiatric evaluation complete. She continues to take Norco 10/325mg TID, Robaxin 500mg TID and Lyrica 150mg TID with some benefit and denies SE of medications. No s/s concerning for cauda equina.     Interval History 4/12/2022:  Patient returns to clinic today for follow-up. Her neuropathic pain continues to be an issue for her, but she says that she is able to manage. She is taking Norco 10-325mg TID, Robaxin 500mg TID, and Lyrica 150mg TID without any adverse side effects. She denies any changes in her symptoms. She would like to proceed with SCS trial. Discussed last time that her HbA1c should be <10, was 9.8 on most recent labs. Seen by psychology and cleared for SCS.     Interval History 2/14/2022:  Mrs Waters presents for follow up. Pt states overall neuropathy continues. Since last visit she has been stable with medication mgt and takign Norco 10/325mg TID, Robaxin 500mg TID and Lyrica 150mg TID. She denies new areas of pain or neurological changes. Medication adequate to control pain without adverse SE.      Interval History 12/21/2021:  Pt presents for urgent evaluation s/p fall in kitchen last night. Pt unsure of LOC or head trauma but states some amnesia of events. Pt is having left knee pain, B ankle pain L>R and lower back/buttock pain. Daughter further states tremor like activity last night. During visit daughter asked for bedside commode due to inability to ambulate.  Pt during visit appeared somnolent, pale and began vomiting. Discussed going to ER for further evaluation.      Interval History 12/14/2021:  Mrs Waters presents for follow up of chronic pain complaints. She  is hopeful she will have A1C lower soon to move forward with SCS. She is doing fair with medication mgt alone at this time and denies SE of medications. Pt is currently taking Norco 10/325mg TID PRN, Lyrica 150mg TID, and Robaxin 500mg TID. She denies new areas of pain or neurological changes.      Interval History 10/14/2021:  The Pt presents for follow up and s/p B Lumbar sympathetic blocks. Pt states this has done well but ready to proceed with SCS trial. She is aware A1C must be under tight control and Pt and daughter re-iterate knowing this. Pt also mentions DKA admission and diagnosis of vasculitis as well over interval. Pt continues to take hydrocodone 10/325 mg TID PRN, Lyrica 150 mg TID, and Robaxin 500mg TID. She denies any SE of medication, denies new neurological changes.      Interval Hx 09/16/2021  Indira Waters presents to the clinic for a follow-up appointment for f/u after bilateral lumbar sympathetic block on 8/25/21.Patient reports >50% relief after lumbar sympathetic block that lasted 2 weeks when she then developed a UTI/DKA, was admitted to the hospital and pain recurred.  Current pain intensity is 8/10.     Interval History 8/12/2021:  Indira Waters presents to the clinic for a follow-up appointment for BLE diabetic neuropathy, painful. Continues with Norco 10/325mg, Lyrica 150mg TID, Robaxin 750mg daily PRN. She had to cancel previously scheduled lumbar sympathetic block 2/2 lithotripsy for painful kidney stones, which have now passed. She still has intermittent pain with urination but overall that pain is improving. She had to cancel previous angiogram for this same reason - this has not been rescheduled yet. She denies any change in her LE pain. Denies any new neurological sxs in BLEs.     Interval History 6/10/21:  Indira Waters presents to the clinic for a 2 week follow-up appointment from lumbar sympathetic nerve block in early May. She reports minimal relief from this  "intervention, but did note that it helped some, briefly. Since the last visit, Indira Waters states the pain has been persistant. Current pain intensity is 10/10. Patient has chronic generalized diffuse pain, most pronounced in her BL lower extremities 2/2 diabetic neuropathy. HSe states that the pain is a little better than at her last visit. Most days are 10/10, but some days are better than others. Her pain is aggravated by exertion, walking, or sitting/standing for extended periods of time. He pain is mildly improved by rest and medications. She is currently taking Lyrica 150 PO TID, Zoloft, and Norco 10-325mg TID PRN. She states that the pain meds are helping but she is still constantly in pain and unable to participate in her ADLs. She has now established care with PCP for assistance w/ poorly controlled T2DM, last A1c 11.4. She has not yet established care w/ Rheumatology for fibromyalgia management.    Interval HPI 4/15/21:  Patient returns for follow up of chronic generalized diffuse pain. At the last visit, had EMG (not yet completed), lumbar and hip x-ray imaging ordered. She was also referred to Rheumatology for fibromyalgia management and referral to PCP for DM2 management and weaning of zoloft for transition to cymbalta for treatment of neuropathy. She had her Lyrica increased to 150mg PO TID, and prescribed Norco 10mg TID with opioid contract signed. She has been taking Norco 10mg TID and it has been helping her "bad" pains but does not take all of her pain away. She has not yet been set up with her new PCP or rheumatologist yet for fibromyalgia. Still continues to have generalized pain everywhere including feet, hips, legs, lower and upper back, neck and shoulder pain. Continues to have neuropathy in the bilateral lower extremities due to uncontrolled DM2.    Initial Visit 3/11/21:  Indira Waters presents to the clinic for the evaluation of chronic pain. Complaining of pain everywhere including feet, " legs, hips, lower and upper back, neck, shoulder. Pain started 5+ years ago. Pain 10/10 at worse. She was referred here by her PCP Dr. Ramos who she has been seeing for over 6 years. She states her pain is aggravated by any physical activity/movement. She takes lyrica, robaxin, and Norco 10 TID with mild relief of pain. Per patient, she was referred here by her PCP for medication refill. She has not tried physical therapy for several years, she states it did not help last time she was in PT. She says she is trying to exercise daily by doing yoga. She walks with a walker. She has numerous comorbidities including DM2 (Last A1C 9+ per prior family medicine note in Jan 2021), fibromyalgia, lupus, DDD. She states she would like to find a new PCP as she no longer lives near her old one. Patient denies night fever/night sweats, urinary incontinence, bowel incontinence and significant weight loss.      Pain Disability Index Review:      10/11/2023     2:34 PM 9/15/2023     2:03 PM 6/16/2023    11:37 AM   Last 3 PDI Scores   Pain Disability Index (PDI) 40 43 41     Pain Medications:  Norco 10-325mg TID, Robaxin 500mg TID, and Lyrica 150mg TID    Opioid Contract: yes     report:  Reviewed and consistent with medication use as prescribed.    Pain Procedures:    - reports possible back injection 10+ years ago  - Lumbar sympathetic nerve block 05/05/21 - Dr. Pereira - minimal relief    Physical Therapy/Home Exercise: no     Imaging:   Hip X-ray 4/7/21  FINDINGS:  Osseous structures appear intact without evidence of fracture or osseous destructive process.  No apparent dislocation.     Modest degenerative change or significant joint space narrowing.     Lumbar X-ray 4/7/21  FINDINGS:  Diffuse bony demineralization.  Vertebral bodies are normal in height without evidence of fracture.  No pars defects.     Normal sagittal alignment is preserved.  No spondylolisthesis. No abnormal translation with flexion and extension.    "  Intervertebral disc heights are well maintained.  Mild facet arthropathy in the lower lumbar spine.     Mild scattered vascular calcification.     Impression:     No evidence of fracture or malalignment.     Mild facet arthropathy in the lower lumbar spine.     Diffuse bony demineralization.  Consider correlation with DEXA.    Allergies:   Review of patient's allergies indicates:   Allergen Reactions    Bleach (sodium hypochlorite) Shortness Of Breath    Nitrofurantoin macrocrystalline Anaphylaxis    Lipitor [atorvastatin] Diarrhea and Rash    Nsaids (non-steroidal anti-inflammatory drug)     Pcn [penicillins]     Toradol [ketorolac]        Current Medications:   Current Outpatient Medications   Medication Sig Dispense Refill    acetaminophen (TYLENOL) 500 MG tablet Take 2 tablets (1,000 mg total) by mouth every 8 (eight) hours as needed for Pain.  0    allopurinoL (ZYLOPRIM) 300 MG tablet Take 1 tablet (300 mg total) by mouth once daily. 90 tablet 1    aspirin (ECOTRIN) 81 MG EC tablet Take 1 tablet (81 mg total) by mouth once daily. 30 tablet 0    BD ULTRA-FINE HIEN PEN NEEDLE 32 gauge x 5/32" Ndle Use with insulin 5 times a day. 450 each 3    blood sugar diagnostic Strp To check BG 6 times daily, to use with insurance preferred meter 200 each 11    blood-glucose meter kit To check BG 6 times daily, to use with insurance preferred meter 1 each 0    blood-glucose meter,continuous (DEXCOM G7 ) Misc Use as directed. 1 each 0    blood-glucose sensor (DEXCOM G7 SENSOR) Nicole Change every 10 days. 3 each 11    cloNIDine (CATAPRES) 0.1 MG tablet Take 1 tablet (0.1 mg total) by mouth 2 (two) times daily. 180 tablet 0    evolocumab (REPATHA SURECLICK) 140 mg/mL PnIj Inject 1 mL (140 mg total) into the skin every 14 (fourteen) days. 8 each 0    fenofibrate micronized (LOFIBRA) 134 MG Cap Take 1 capsule (134 mg total) by mouth daily with breakfast. 90 capsule 3    furosemide (LASIX) 40 MG tablet TAKE ONE TABLET BY " MOUTH EVERY DAY 90 tablet 1    HYDROcodone-acetaminophen (NORCO)  mg per tablet Take 1 tablet by mouth every 8 (eight) hours as needed for Pain. 90 tablet 0    hydrocortisone (ANUSOL-HC) 2.5 % rectal cream Procto-Med HC 2.5 % topical cream perineal applicator      insulin aspart U-100 (NOVOLOG FLEXPEN U-100 INSULIN) 100 unit/mL (3 mL) InPn pen Inject 10 Units into the skin 3 (three) times daily with meals. + meal correction scale. Max 40 units 30 mL 3    insulin detemir U-100, Levemir, (LEVEMIR FLEXTOUCH U100 INSULIN) 100 unit/mL (3 mL) InPn pen Inject 22 Units into the skin once daily AND 15 Units every evening. 30 mL 3    ipratropium-albuteroL (COMBIVENT)  mcg/actuation inhaler Inhale 1 puff into the lungs by mouth every 6 (six) hours. 4 g 0    lancets Misc To check BG 6 times daily, to use with insurance preferred meter 200 each 11    methocarbamoL (ROBAXIN) 500 MG Tab Take 1 tablet (500 mg total) by mouth 3 (three) times daily as needed (muscle spasm). 90 tablet 2    metoclopramide HCl (REGLAN) 5 MG tablet Take 1 tablet (5 mg total) by mouth 4 (four) times daily as needed (nausea, prevention). 30 tablet 3    metoprolol succinate (TOPROL-XL) 100 MG 24 hr tablet Take 1 tablet (100 mg total) by mouth once daily. 90 tablet 3    nitroGLYCERIN (NITROSTAT) 0.4 MG SL tablet Place 1 tablet (0.4 mg total) under the tongue every 5 (five) minutes as needed for Chest pain. 25 tablet 11    pantoprazole (PROTONIX) 40 MG tablet Take 1 tablet (40 mg total) by mouth once daily. 30 tablet 0    pregabalin (LYRICA) 150 MG capsule TAKE ONE CAPSULE BY MOUTH 3 TIMES DAILY 90 capsule 2    senna-docusate 8.6-50 mg (PERICOLACE) 8.6-50 mg per tablet Take 1 tablet by mouth 2 (two) times daily as needed for Constipation. 60 tablet 0    sertraline (ZOLOFT) 100 MG tablet Take 1 tablet (100 mg total) by mouth once daily. 90 tablet 3    sulfamethoxazole-trimethoprim 800-160mg (BACTRIM DS) 800-160 mg Tab sulfamethoxazole 800  mg-trimethoprim 160 mg tablet      teriparatide 20 mcg/dose (620mcg/2.48mL) PnIj Inject 20 mcg into the skin once daily. 2.48 mL 1    triamcinolone acetonide 0.1% (KENALOG) 0.1 % cream Apply to affected areas of body twice daily as needed rash. Do not use on face, underarms, or groin. 454 g 0    losartan (COZAAR) 50 MG tablet Take 1 tablet (50 mg total) by mouth once daily. 90 tablet 0    NIFEdipine (PROCARDIA-XL) 30 MG (OSM) 24 hr tablet Take 1 tablet (30 mg total) by mouth 2 (two) times a day. 180 tablet 0     No current facility-administered medications for this visit.       REVIEW OF SYSTEMS:    GENERAL:  No weight loss, malaise or fevers.  HEENT:  Negative for frequent or significant headaches.  NECK:  Negative for lumps, goiter, pain and significant neck swelling.  RESPIRATORY:  Negative for cough, wheezing or shortness of breath.  CARDIOVASCULAR:  Negative for chest pain, leg swelling or palpitations.  GI:  Negative for abdominal discomfort, blood in stools or black stools or change in bowel habits.  MUSCULOSKELETAL:  See HPI.  SKIN:  Negative for lesions, rash, and itching.  PSYCH:  Negative for sleep disturbance, mood disorder and recent psychosocial stressors.  HEMATOLOGY/LYMPHOLOGY:  Negative for prolonged bleeding, bruising easily or swollen nodes.  NEURO:   No history of headaches, syncope, paralysis, seizures or tremors.  All other reviewed and negative other than HPI.    Past Medical History:  Past Medical History:   Diagnosis Date    Arthritis     Back pain     Cancer     ovarian    Cervical cancer     Depression     Diabetes mellitus     Fibromyalgia     Heart attack     Hyperlipidemia     Hypertension     Lupus     Stroke     slight left sided weakness       Past Surgical History:  Past Surgical History:   Procedure Laterality Date    APPENDECTOMY       SECTION      2    CORONARY ANGIOGRAPHY N/A 2022    Procedure: ANGIOGRAM, CORONARY ARTERY;  Surgeon: Jose L Méndez MD;  Location:  [facility], [place], operated by [facility] CATH LAB;  Service: Cardiology;  Laterality: N/A;    HYSTERECTOMY      with USO for cervical cancer    INJECTION OF ANESTHETIC AGENT AROUND NERVE Bilateral 2021    Procedure: BLOCK, NERVE, SYMPATHIC;  Surgeon: Holden Pereira MD;  Location: [facility], [place], operated by [facility] PAIN MGT;  Service: Pain Management;  Laterality: Bilateral;    INJECTION OF ANESTHETIC AGENT AROUND NERVE N/A 2021    Procedure: BLOCK, NERVE, SYMPATHETIC  need consent;  Surgeon: Holden Pereira MD;  Location: [facility], [place], operated by [facility] PAIN MGT;  Service: Pain Management;  Laterality: N/A;    SD EVAL,SWALLOW FUNCTION,CINE/VIDEO RECORD  2021         TONSILLECTOMY      TRIAL OF SPINAL CORD NERVE STIMULATOR N/A 3/8/2023    Procedure: LUMBAR SPINAL CORD STIMULATOR TRIAL NEVRO REP PATIENT STATES SHE NO LONGER TAKES PLAVIX;  Surgeon: Holden Pereira MD;  Location: [facility], [place], operated by [facility] PAIN MGT;  Service: Pain Management;  Laterality: N/A;       Family History:  Family History   Problem Relation Age of Onset    COPD Mother     Lupus Mother     Hernia Mother     Uterine cancer Mother         vs cervical cancer    Ovarian cancer Mother     Diabetes Father     Coronary artery disease Father     Colon cancer Maternal Grandmother         in her 50's       Social History:  Social History     Socioeconomic History    Marital status:    Tobacco Use    Smoking status: Former     Current packs/day: 0.00     Types: Cigarettes     Quit date: 2020     Years since quittin.9     Passive exposure: Past    Smokeless tobacco: Never   Substance and Sexual Activity    Alcohol use: Not Currently     Comment: occasionally    Drug use: Yes     Types: Hydrocodone     Comment: three times a day    Sexual activity: Not Currently   Social History Narrative    Lives with daughter. .      Social Determinants of Health     Financial Resource Strain: Low Risk  (2022)    Overall Financial Resource Strain (CARDIA)     Difficulty of Paying Living Expenses: Not hard at all   Food Insecurity: No  "Food Insecurity (5/5/2022)    Hunger Vital Sign     Worried About Running Out of Food in the Last Year: Never true     Ran Out of Food in the Last Year: Never true   Transportation Needs: No Transportation Needs (5/5/2022)    PRAPARE - Transportation     Lack of Transportation (Medical): No     Lack of Transportation (Non-Medical): No   Physical Activity: Insufficiently Active (5/5/2022)    Exercise Vital Sign     Days of Exercise per Week: 3 days     Minutes of Exercise per Session: 20 min   Stress: No Stress Concern Present (5/5/2022)    [employer] - Occupational Stress Questionnaire     Feeling of Stress : Not at all   Social Connections: Socially Isolated (5/5/2022)    Social Connection and Isolation Panel [NHANES]     Frequency of Communication with Friends and Family: More than three times a week     Frequency of Social Gatherings with Friends and Family: Never     Attends Shinto Services: Never     Active Member of Clubs or Organizations: No     Attends Club or Organization Meetings: Never     Marital Status:    Housing Stability: Low Risk  (5/5/2022)    Housing Stability Vital Sign     Unable to Pay for Housing in the Last Year: No     Number of Places Lived in the Last Year: 1     Unstable Housing in the Last Year: No       OBJECTIVE:    BP (!) 145/69 (BP Location: Left arm, Patient Position: Sitting, BP Method: Large (Automatic))   Pulse 80   Temp 97.5 °F (36.4 °C)   Resp 18   Ht 5' 1" (1.549 m)   SpO2 100%   BMI 37.62 kg/m²     PHYSICAL EXAMINATION:     General appearance: Well appearing, in no acute distress, alert and oriented x3.  Psych:  Mood and affect appropriate.  Skin: Skin color, texture, turgor normal, no rashes or lesions, in both upper and lower body.  Head/face:  Atraumatic, normocephalic.  Pulm: Symmetric chest rise, no respiratory distress noted.   Musculoskeletal: 5/5 strength in right ankle with plantar and dorsiflexion. 5/5 strength in left " ankle with plantar and dorsiflexion. 4/5 strength with right knee flexion and extension. 4/5 strength with left knee flexion and extension.   Neuro:  Decreased sensation to light touch to BLE.   Gait: Antalgic- ambulates with wheelchair.     ASSESSMENT: 78 y.o. year old female with chronic pain, consistent with:     1. Chronic pain syndrome        2. Painful diabetic neuropathy              PLAN:     - Previous imaging reviewed. Labs reviewed.     - Qutenza patch application as per below.     - Continue Robaxin 500 mg TID PRN.    - Continue Lyrica 150 mg TID.     - Continue Norco 10/325 mg TID PRN, #90, refill provided.      - The patient is here today for a refill of current pain medications and they believe these provide effective pain control and improvements in quality of life.  The patient notes no serious side effects, and feels the benefits outweigh the risks.  The patient was reminded of the pain contract that they signed previously as well as the risks and benefits of the medication including possible death.  The updated [place] [employer] prescription monitoring program was reviewed, and the patient has been found to be compliant with current treatment plan. Medication management provided by  in absence of Dr. Pereira.     - UDS from 6/16/2023 reviewed and consistent.     The above plan and management options were discussed at length with patient. Patient is in agreement with the above and verbalized understanding.    Elvira Hylton NP  10/11/2023        PATIENT NAME: Indira Waters      MRN: [id number]        Diagnosis: Painful Diabetic Neuropathy E13.42     Postprocedural Diagnosis: Same     Complications: None     Informed Consent:  The patient's condition and proposed procedures, risks (including complications of nerve damage, skin irritation, infection, and failure of pain relief), and alternatives were discussed with the patient or responsible party.  The patient's/responsible  party's questions were answered.  The patient/responsible party appeared to understand and chose to proceed.       Procedural Pause:  A procedural pause verifying correct patient, medical record number, allergies, medications to be administered, current vital signs, and proper application site was performed immediately prior to beginning the procedure.     Procedure in Detail:  The patient was placed in a supine position. 4 patches were used for the procedure today.  The patches were applied to the target area and secured with tape.  A coban was used to further secure the patches in place.  The patient was allowed to rest in a comfortable position, and monitored by staff every 10-15 minutes to ensure comfort. The patches remained in place for 30 minutes.  The patches were then removed and the cleaning gel was applied and allowed to sit for an additional 60 seconds, after which the area was wiped clean.      The patient was then discharged in stable condition.        DISCUSSION:  A Qutenza patch was applied today with the purpose of treating the patients nerve pain. They were informed that they may have some burning of the skin for a few days, and should not take a hot shower for 2-3 days as that may irritate the area.  They were informed that the area may feel like they had a sunburn. They were informed that it can take about 2-4 weeks for the effects of the patch to be fully realized.    I will see the patient for follow up in 1 month.    Plan to repeat every 91 days.      Elvira Hylton NP  10/12/2023

## 2023-10-16 ENCOUNTER — PATIENT MESSAGE (OUTPATIENT)
Dept: ENDOCRINOLOGY | Facility: CLINIC | Age: 78
End: 2023-10-16
Payer: MEDICARE

## 2023-10-16 DIAGNOSIS — E78.01 FAMILIAL HYPERCHOLESTEROLEMIA: ICD-10-CM

## 2023-10-16 DIAGNOSIS — I25.10 CORONARY ARTERY CALCIFICATION: ICD-10-CM

## 2023-10-16 DIAGNOSIS — I25.84 CORONARY ARTERY CALCIFICATION: ICD-10-CM

## 2023-10-16 DIAGNOSIS — I25.118 CORONARY ARTERY DISEASE OF NATIVE ARTERY OF NATIVE HEART WITH STABLE ANGINA PECTORIS: ICD-10-CM

## 2023-10-17 RX ORDER — EVOLOCUMAB 140 MG/ML
140 INJECTION, SOLUTION SUBCUTANEOUS
Qty: 8 EACH | Refills: 0 | Status: ACTIVE | OUTPATIENT
Start: 2023-10-17 | End: 2024-02-05 | Stop reason: SDUPTHER

## 2023-10-26 ENCOUNTER — OFFICE VISIT (OUTPATIENT)
Dept: INTERNAL MEDICINE | Facility: CLINIC | Age: 78
End: 2023-10-26
Payer: MEDICARE

## 2023-10-26 ENCOUNTER — LAB VISIT (OUTPATIENT)
Dept: LAB | Facility: OTHER | Age: 78
End: 2023-10-26
Attending: STUDENT IN AN ORGANIZED HEALTH CARE EDUCATION/TRAINING PROGRAM
Payer: MEDICARE

## 2023-10-26 VITALS
SYSTOLIC BLOOD PRESSURE: 132 MMHG | DIASTOLIC BLOOD PRESSURE: 62 MMHG | BODY MASS INDEX: 37.62 KG/M2 | HEART RATE: 80 BPM | OXYGEN SATURATION: 93 % | HEIGHT: 61 IN

## 2023-10-26 DIAGNOSIS — E11.65 TYPE 2 DIABETES MELLITUS WITH HYPERGLYCEMIA, WITH LONG-TERM CURRENT USE OF INSULIN: ICD-10-CM

## 2023-10-26 DIAGNOSIS — C53.9: ICD-10-CM

## 2023-10-26 DIAGNOSIS — M80.00XA AGE-RELATED OSTEOPOROSIS WITH CURRENT PATHOLOGICAL FRACTURE, INITIAL ENCOUNTER: ICD-10-CM

## 2023-10-26 DIAGNOSIS — K21.9 GASTROESOPHAGEAL REFLUX DISEASE WITHOUT ESOPHAGITIS: Chronic | ICD-10-CM

## 2023-10-26 DIAGNOSIS — F32.A DEPRESSION, UNSPECIFIED DEPRESSION TYPE: Chronic | ICD-10-CM

## 2023-10-26 DIAGNOSIS — I25.10 CORONARY ARTERY CALCIFICATION: Chronic | ICD-10-CM

## 2023-10-26 DIAGNOSIS — N18.32 STAGE 3B CHRONIC KIDNEY DISEASE: ICD-10-CM

## 2023-10-26 DIAGNOSIS — Z85.41 HISTORY OF CERVICAL CANCER: ICD-10-CM

## 2023-10-26 DIAGNOSIS — G89.4 CHRONIC PAIN SYNDROME: Chronic | ICD-10-CM

## 2023-10-26 DIAGNOSIS — R23.8 SKIN BREAKDOWN: ICD-10-CM

## 2023-10-26 DIAGNOSIS — D69.0 IGA MEDIATED LEUKOCYTOCLASTIC VASCULITIS: ICD-10-CM

## 2023-10-26 DIAGNOSIS — E11.42 DIABETIC POLYNEUROPATHY ASSOCIATED WITH TYPE 2 DIABETES MELLITUS: Chronic | ICD-10-CM

## 2023-10-26 DIAGNOSIS — G47.30 SLEEP APNEA, UNSPECIFIED TYPE: ICD-10-CM

## 2023-10-26 DIAGNOSIS — N18.4 STAGE 4 CHRONIC KIDNEY DISEASE: ICD-10-CM

## 2023-10-26 DIAGNOSIS — M79.2 NEUROPATHIC PAIN: ICD-10-CM

## 2023-10-26 DIAGNOSIS — I25.84 CORONARY ARTERY CALCIFICATION: Chronic | ICD-10-CM

## 2023-10-26 DIAGNOSIS — I10 ESSENTIAL HYPERTENSION: Primary | Chronic | ICD-10-CM

## 2023-10-26 DIAGNOSIS — I25.118 CORONARY ARTERY DISEASE OF NATIVE ARTERY OF NATIVE HEART WITH STABLE ANGINA PECTORIS: ICD-10-CM

## 2023-10-26 DIAGNOSIS — Z79.4 TYPE 2 DIABETES MELLITUS WITH HYPERGLYCEMIA, WITH LONG-TERM CURRENT USE OF INSULIN: ICD-10-CM

## 2023-10-26 DIAGNOSIS — J41.1 MUCOPURULENT CHRONIC BRONCHITIS: ICD-10-CM

## 2023-10-26 DIAGNOSIS — E78.01 FAMILIAL HYPERCHOLESTEROLEMIA: Chronic | ICD-10-CM

## 2023-10-26 LAB
ALBUMIN SERPL BCP-MCNC: 3.8 G/DL (ref 3.5–5.2)
ALP SERPL-CCNC: 72 U/L (ref 55–135)
ALT SERPL W/O P-5'-P-CCNC: 17 U/L (ref 10–44)
ANION GAP SERPL CALC-SCNC: 12 MMOL/L (ref 8–16)
AST SERPL-CCNC: 21 U/L (ref 10–40)
BASOPHILS # BLD AUTO: 0.06 K/UL (ref 0–0.2)
BASOPHILS NFR BLD: 0.4 % (ref 0–1.9)
BILIRUB SERPL-MCNC: 0.2 MG/DL (ref 0.1–1)
BUN SERPL-MCNC: 20 MG/DL (ref 8–23)
CALCIUM SERPL-MCNC: 9.5 MG/DL (ref 8.7–10.5)
CHLORIDE SERPL-SCNC: 97 MMOL/L (ref 95–110)
CHOLEST SERPL-MCNC: 99 MG/DL (ref 120–199)
CHOLEST/HDLC SERPL: 3.1 {RATIO} (ref 2–5)
CO2 SERPL-SCNC: 25 MMOL/L (ref 23–29)
CREAT SERPL-MCNC: 1.4 MG/DL (ref 0.5–1.4)
DIFFERENTIAL METHOD: ABNORMAL
EOSINOPHIL # BLD AUTO: 0.4 K/UL (ref 0–0.5)
EOSINOPHIL NFR BLD: 2.6 % (ref 0–8)
ERYTHROCYTE [DISTWIDTH] IN BLOOD BY AUTOMATED COUNT: 15.3 % (ref 11.5–14.5)
EST. GFR  (NO RACE VARIABLE): 39 ML/MIN/1.73 M^2
ESTIMATED AVG GLUCOSE: 226 MG/DL (ref 68–131)
GLUCOSE SERPL-MCNC: 301 MG/DL (ref 70–110)
HBA1C MFR BLD: 9.5 % (ref 4–5.6)
HCT VFR BLD AUTO: 34.8 % (ref 37–48.5)
HDLC SERPL-MCNC: 32 MG/DL (ref 40–75)
HDLC SERPL: 32.3 % (ref 20–50)
HGB BLD-MCNC: 11.2 G/DL (ref 12–16)
IMM GRANULOCYTES # BLD AUTO: 0.09 K/UL (ref 0–0.04)
IMM GRANULOCYTES NFR BLD AUTO: 0.7 % (ref 0–0.5)
LDLC SERPL CALC-MCNC: 23 MG/DL (ref 63–159)
LYMPHOCYTES # BLD AUTO: 3.3 K/UL (ref 1–4.8)
LYMPHOCYTES NFR BLD: 24.5 % (ref 18–48)
MCH RBC QN AUTO: 27.9 PG (ref 27–31)
MCHC RBC AUTO-ENTMCNC: 32.2 G/DL (ref 32–36)
MCV RBC AUTO: 87 FL (ref 82–98)
MONOCYTES # BLD AUTO: 0.7 K/UL (ref 0.3–1)
MONOCYTES NFR BLD: 5.5 % (ref 4–15)
NEUTROPHILS # BLD AUTO: 8.8 K/UL (ref 1.8–7.7)
NEUTROPHILS NFR BLD: 66.3 % (ref 38–73)
NONHDLC SERPL-MCNC: 67 MG/DL
NRBC BLD-RTO: 0 /100 WBC
PLATELET # BLD AUTO: 286 K/UL (ref 150–450)
PMV BLD AUTO: 11.9 FL (ref 9.2–12.9)
POTASSIUM SERPL-SCNC: 3.8 MMOL/L (ref 3.5–5.1)
PROT SERPL-MCNC: 7.3 G/DL (ref 6–8.4)
RBC # BLD AUTO: 4.02 M/UL (ref 4–5.4)
SODIUM SERPL-SCNC: 134 MMOL/L (ref 136–145)
TRIGL SERPL-MCNC: 220 MG/DL (ref 30–150)
WBC # BLD AUTO: 13.34 K/UL (ref 3.9–12.7)

## 2023-10-26 PROCEDURE — 99999 PR PBB SHADOW E&M-EST. PATIENT-LVL V: ICD-10-PCS | Mod: PBBFAC,,, | Performed by: STUDENT IN AN ORGANIZED HEALTH CARE EDUCATION/TRAINING PROGRAM

## 2023-10-26 PROCEDURE — 99215 OFFICE O/P EST HI 40 MIN: CPT | Mod: S$PBB,,, | Performed by: STUDENT IN AN ORGANIZED HEALTH CARE EDUCATION/TRAINING PROGRAM

## 2023-10-26 PROCEDURE — 36415 COLL VENOUS BLD VENIPUNCTURE: CPT | Performed by: STUDENT IN AN ORGANIZED HEALTH CARE EDUCATION/TRAINING PROGRAM

## 2023-10-26 PROCEDURE — 80053 COMPREHEN METABOLIC PANEL: CPT | Performed by: STUDENT IN AN ORGANIZED HEALTH CARE EDUCATION/TRAINING PROGRAM

## 2023-10-26 PROCEDURE — 99215 PR OFFICE/OUTPT VISIT, EST, LEVL V, 40-54 MIN: ICD-10-PCS | Mod: S$PBB,,, | Performed by: STUDENT IN AN ORGANIZED HEALTH CARE EDUCATION/TRAINING PROGRAM

## 2023-10-26 PROCEDURE — 99215 OFFICE O/P EST HI 40 MIN: CPT | Mod: PBBFAC | Performed by: STUDENT IN AN ORGANIZED HEALTH CARE EDUCATION/TRAINING PROGRAM

## 2023-10-26 PROCEDURE — 99999 PR PBB SHADOW E&M-EST. PATIENT-LVL V: CPT | Mod: PBBFAC,,, | Performed by: STUDENT IN AN ORGANIZED HEALTH CARE EDUCATION/TRAINING PROGRAM

## 2023-10-26 PROCEDURE — 80061 LIPID PANEL: CPT | Performed by: STUDENT IN AN ORGANIZED HEALTH CARE EDUCATION/TRAINING PROGRAM

## 2023-10-26 PROCEDURE — 85025 COMPLETE CBC W/AUTO DIFF WBC: CPT | Performed by: STUDENT IN AN ORGANIZED HEALTH CARE EDUCATION/TRAINING PROGRAM

## 2023-10-26 PROCEDURE — 83036 HEMOGLOBIN GLYCOSYLATED A1C: CPT | Performed by: STUDENT IN AN ORGANIZED HEALTH CARE EDUCATION/TRAINING PROGRAM

## 2023-10-26 RX ORDER — MUPIROCIN 20 MG/G
OINTMENT TOPICAL 3 TIMES DAILY PRN
Qty: 22 G | Refills: 3 | Status: SHIPPED | OUTPATIENT
Start: 2023-10-26 | End: 2024-02-29 | Stop reason: SDUPTHER

## 2023-10-26 NOTE — PROGRESS NOTES
[facility]    Subjective:      Patient ID: Indira Waters is a 78 y.o. female.    Chief Complaint: Annual Exam      History was obtained from the patient and supplemented through chart review.  This pt is known to me.    HPI:    Patient is a 78 y.o. female with chronic medical problems including DM, HTN, HLD, CAD, fibromyalgia    Has not been seen by me in the past year    DM  Uncontrolled  10.5 5/19/2023  Stopped grits, stopped ice cream; daughter started cooking more, changed ingredients at home  Still with nausea  Trying to get dexcom with Radha Lewis  Using carbquick instead of flour  Check labs today    Severely deconditioned  Exercise encouraged, movement difficult    CAD\CHF HFpEF  Followed by Dr. Méndez  Cardiac Cath 5/6/2022 with significant blockages   Taking ASA 81, plavix  Not taking statin due to vasculitis thought to be 2/2 statin; repatha started  Nitro prn    Normocytic Anemia  Family history of colon cancer in grandmother  Need iron studies, B12, folate  Did not follow up heme onc as requested  Discussed risk/benefit will follow anemia, symptoms  Monitor    Fibromyalgia   Chronic Pain Syndrome  Stable currently    Lupus?  Not treated in the past  Pt thinks it is currently flared  STEVE normal 2021, 2022    Osteoporosis  Did not tolerate bisphosphonate in the past  AE dicussed, Rx prolia  Advised on r/b, pt choses to pursue    HTN  Controlled   Procardia 30 BID, losartan 50 mg daily, clonidine 0.1 mg BID, toprol 50 mg daily  F/u BP w/ 2 week nurse visit    CHF   Grade II DD  Lasix 40 mg daily  Doing well    Drug induced Vasculitis  Thought to be 2/2 to atorvastatin, switched to repatha q 14 days  Was discontinued in April for unknown reason.  Reordering and sent pt portal message    CKD Stage 3b  Nephrology referral #5 placed today, encourage pt again to make appt.   Avoid nsaids, nephrotoxic meds  Also with constipation    Depression  zoloft 100 mg daily   Doing  better    History of cervical cancer at about age 31, never a problem later in life  S/p hyst with 1 ovary remaining  Return to gyn for at least one more exam    Wt Readings from Last 15 Encounters:   09/15/23 90.3 kg (199 lb 1.2 oz)   23 88.6 kg (195 lb 5.2 oz)   23 88.6 kg (195 lb 6.4 oz)   23 89.7 kg (197 lb 12 oz)   23 83.8 kg (184 lb 11.9 oz)   23 88 kg (194 lb)   23 88.1 kg (194 lb 3.6 oz)   23 88.1 kg (194 lb 3.2 oz)   23 81.2 kg (179 lb)   23 81.2 kg (179 lb)   22 84.8 kg (187 lb)   22 86.9 kg (191 lb 9.3 oz)   10/12/22 84 kg (185 lb 3 oz)   10/07/22 84.4 kg (186 lb)   22 86.3 kg (190 lb 4.8 oz)      Son and   on mothers' day    Medical History  Past Medical History:   Diagnosis Date    Arthritis     Back pain     Cancer     ovarian    Cervical cancer     Depression     Diabetes mellitus     Fibromyalgia     Heart attack     Hyperlipidemia     Hypertension     Lupus     Stroke     slight left sided weakness       Review of Systems   Constitutional:  Negative for activity change and unexpected weight change.   HENT:  Positive for trouble swallowing. Negative for hearing loss and rhinorrhea.    Eyes:  Negative for discharge and visual disturbance.   Respiratory:  Negative for chest tightness and wheezing.    Cardiovascular:  Negative for chest pain and palpitations.   Gastrointestinal:  Negative for blood in stool, constipation, diarrhea and vomiting.   Endocrine: Negative for polydipsia and polyuria.   Genitourinary:  Negative for difficulty urinating, dysuria, hematuria and menstrual problem.   Musculoskeletal:  Positive for arthralgias, joint swelling and neck pain.   Neurological:  Negative for weakness and headaches.   Psychiatric/Behavioral:  Negative for confusion and dysphoric mood.          Surgical hx, family hx, social hx   Have been reviewed      Current Outpatient Medications:     allopurinoL (ZYLOPRIM) 300 MG  "tablet, Take 1 tablet (300 mg total) by mouth once daily., Disp: 90 tablet, Rfl: 1    aspirin (ECOTRIN) 81 MG EC tablet, Take 1 tablet (81 mg total) by mouth once daily., Disp: 30 tablet, Rfl: 0    BD ULTRA-FINE HIEN PEN NEEDLE 32 gauge x 5/32" Ndle, Use with insulin 5 times a day., Disp: 450 each, Rfl: 3    blood sugar diagnostic Strp, To check BG 6 times daily, to use with insurance preferred meter, Disp: 200 each, Rfl: 11    blood-glucose meter kit, To check BG 6 times daily, to use with insurance preferred meter, Disp: 1 each, Rfl: 0    blood-glucose meter,continuous (DEXCOM G7 ) Misc, Use as directed., Disp: 1 each, Rfl: 0    blood-glucose sensor (DEXCOM G7 SENSOR) Nicole, Change every 10 days., Disp: 3 each, Rfl: 11    cloNIDine (CATAPRES) 0.1 MG tablet, Take 1 tablet (0.1 mg total) by mouth 2 (two) times daily., Disp: 180 tablet, Rfl: 0    evolocumab (REPATHA SURECLICK) 140 mg/mL PnIj, Inject 1 mL (140 mg total) into the skin every 14 (fourteen) days., Disp: 8 each, Rfl: 0    fenofibrate micronized (LOFIBRA) 134 MG Cap, Take 1 capsule (134 mg total) by mouth daily with breakfast., Disp: 90 capsule, Rfl: 3    furosemide (LASIX) 40 MG tablet, TAKE ONE TABLET BY MOUTH EVERY DAY, Disp: 90 tablet, Rfl: 1    HYDROcodone-acetaminophen (NORCO)  mg per tablet, Take 1 tablet by mouth every 8 (eight) hours as needed for Pain., Disp: 90 tablet, Rfl: 0    hydrocortisone (ANUSOL-HC) 2.5 % rectal cream, Procto-Med HC 2.5 % topical cream perineal applicator, Disp: , Rfl:     insulin aspart U-100 (NOVOLOG FLEXPEN U-100 INSULIN) 100 unit/mL (3 mL) InPn pen, Inject 10 Units into the skin 3 (three) times daily with meals. + meal correction scale. Max 40 units, Disp: 30 mL, Rfl: 3    insulin detemir U-100, Levemir, (LEVEMIR FLEXTOUCH U100 INSULIN) 100 unit/mL (3 mL) InPn pen, Inject 22 Units into the skin once daily AND 15 Units every evening., Disp: 30 mL, Rfl: 3    ipratropium-albuteroL (COMBIVENT)  " mcg/actuation inhaler, Inhale 1 puff into the lungs by mouth every 6 (six) hours., Disp: 4 g, Rfl: 0    lancets Misc, To check BG 6 times daily, to use with insurance preferred meter, Disp: 200 each, Rfl: 11    methocarbamoL (ROBAXIN) 500 MG Tab, Take 1 tablet (500 mg total) by mouth 3 (three) times daily as needed (muscle spasm)., Disp: 90 tablet, Rfl: 2    metoclopramide HCl (REGLAN) 5 MG tablet, Take 1 tablet (5 mg total) by mouth 4 (four) times daily as needed (nausea, prevention)., Disp: 30 tablet, Rfl: 3    metoprolol succinate (TOPROL-XL) 100 MG 24 hr tablet, Take 1 tablet (100 mg total) by mouth once daily., Disp: 90 tablet, Rfl: 3    nitroGLYCERIN (NITROSTAT) 0.4 MG SL tablet, Place 1 tablet (0.4 mg total) under the tongue every 5 (five) minutes as needed for Chest pain., Disp: 25 tablet, Rfl: 11    pantoprazole (PROTONIX) 40 MG tablet, Take 1 tablet (40 mg total) by mouth once daily., Disp: 30 tablet, Rfl: 0    pregabalin (LYRICA) 150 MG capsule, TAKE ONE CAPSULE BY MOUTH 3 TIMES DAILY, Disp: 90 capsule, Rfl: 2    senna-docusate 8.6-50 mg (PERICOLACE) 8.6-50 mg per tablet, Take 1 tablet by mouth 2 (two) times daily as needed for Constipation., Disp: 60 tablet, Rfl: 0    sertraline (ZOLOFT) 100 MG tablet, Take 1 tablet (100 mg total) by mouth once daily., Disp: 90 tablet, Rfl: 3    teriparatide 20 mcg/dose (620mcg/2.48mL) PnIj, Inject 20 mcg into the skin once daily., Disp: 2.48 mL, Rfl: 1    acetaminophen (TYLENOL) 500 MG tablet, Take 2 tablets (1,000 mg total) by mouth every 8 (eight) hours as needed for Pain. (Patient not taking: Reported on 10/26/2023), Disp: , Rfl: 0    losartan (COZAAR) 50 MG tablet, Take 1 tablet (50 mg total) by mouth once daily., Disp: 90 tablet, Rfl: 0    mupirocin (BACTROBAN) 2 % ointment, Apply topically 3 (three) times daily as needed (skin breakdown)., Disp: 22 g, Rfl: 3    NIFEdipine (PROCARDIA-XL) 30 MG (OSM) 24 hr tablet, Take 1 tablet (30 mg total) by mouth 2 (two) times a  "day., Disp: 180 tablet, Rfl: 0    triamcinolone acetonide 0.1% (KENALOG) 0.1 % cream, Apply to affected areas of body twice daily as needed rash. Do not use on face, underarms, or groin. (Patient not taking: Reported on 10/26/2023), Disp: 454 g, Rfl: 0    Objective:        Body mass index is 37.62 kg/m².  Vitals:    10/26/23 1341   BP: 132/62   Pulse: 80   SpO2: (!) 93%   Height: 5' 1" (1.549 m)   PainSc:   8   PainLoc: Generalized         Physical Exam  Vitals and nursing note reviewed.   Constitutional:       General: She is not in acute distress.     Appearance: Normal appearance. She is not ill-appearing.      Comments: In wheelchair with cane   HENT:      Head: Normocephalic and atraumatic.   Eyes:      General: No scleral icterus.  Cardiovascular:      Rate and Rhythm: Normal rate and regular rhythm.      Heart sounds: Normal heart sounds.   Pulmonary:      Effort: Pulmonary effort is normal.   Musculoskeletal:         General: Swelling (1+ bilat) present. No deformity.   Skin:     Findings: Rash present.   Neurological:      Mental Status: She is alert and oriented to person, place, and time.   Psychiatric:         Behavior: Behavior normal.             Lab Results   Component Value Date    WBC 13.34 (H) 10/26/2023    HGB 11.2 (L) 10/26/2023    HCT 34.8 (L) 10/26/2023     10/26/2023    CHOL 171 09/30/2022    TRIG 210 (H) 09/30/2022    HDL 38 (L) 09/30/2022    ALT 17 10/26/2023    AST 21 10/26/2023     (L) 10/26/2023    K 3.8 10/26/2023    CL 97 10/26/2023    CREATININE 1.4 10/26/2023    BUN 20 10/26/2023    CO2 25 10/26/2023    TSH 2.432 09/30/2022    INR 1.0 05/05/2022    HGBA1C 10.5 (H) 05/19/2023       The 10-year ASCVD risk score (Annamarie WEATHERS, et al., 2019) is: 44.4%    Values used to calculate the score:      Age: 78 years      Sex: Female      Is Non- : No      Diabetic: Yes      Tobacco smoker: No      Systolic Blood Pressure: 132 mmHg      Is BP treated: Yes      HDL " Cholesterol: 38 mg/dL      Total Cholesterol: 171 mg/dL    Repatha    (Imaging have been independently reviewed)    Reviewed heart catch from 5/2022      Assessment:         1. Essential hypertension    2. Chronic pain syndrome    3. Mucopurulent chronic bronchitis    4. Familial hypercholesterolemia    5. Stage 3b chronic kidney disease    6. Age-related osteoporosis with current pathological fracture, initial encounter    7. Stage 4 chronic kidney disease    8. Type 2 diabetes mellitus with hyperglycemia, with long-term current use of insulin    9. 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 pectoris    10. Coronary artery calcification    11. Gastroesophageal reflux disease without esophagitis    12. Carcinoma of uterine cervix, invasive    13. IgA mediated leukocytoclastic vasculitis    14. Skin breakdown    15. Sleep apnea, unspecified type    16. Diabetic polyneuropathy associated with type 2 diabetes mellitus    17. Neuropathic pain    18. Depression, unspecified depression type    19. History of cervical cancer                Plan:     Indira was seen today for annual exam.    Diagnoses and all orders for this visit:    Essential hypertension    Chronic pain syndrome    Mucopurulent chronic bronchitis    Familial hypercholesterolemia    Stage 3b chronic kidney disease    Age-related osteoporosis with current pathological fracture, initial encounter    Stage 4 chronic kidney disease  -     COMPREHENSIVE METABOLIC PANEL; Future  -     CBC Auto Differential; Future    Type 2 diabetes mellitus with hyperglycemia, with long-term current use of insulin  -     Hemoglobin A1C; Future    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 pectoris  -     Lipid Panel; Future    Coronary artery calcification    Gastroesophageal reflux disease without esophagitis    Carcinoma of uterine cervix, invasive  -     Ambulatory referral/consult to Obstetrics / Gynecology;  Future    IgA mediated leukocytoclastic vasculitis    Skin breakdown  -     mupirocin (BACTROBAN) 2 % ointment; Apply topically 3 (three) times daily as needed (skin breakdown).    Sleep apnea, unspecified type  -     Ambulatory referral/consult to Sleep Disorders; Future    Diabetic polyneuropathy associated with type 2 diabetes mellitus    Neuropathic pain    Depression, unspecified depression type    History of cervical cancer        Follow up in about 3 months (around 1/26/2024) for virtual. or sooner prn      All of your core healthy metrics are met.    40 min were used in chart review, evaluation and counseling of patient, documentation and review of results on same day of service    All medications were reviewed including potential side effects and risks/benefits.  Pt was counseled to call back if anything worsens or if questions arise.    Chun Zapata MD  Family Medicine  [facility]  [facility] [address] [address]  Phone [phone]  Fax [phone]

## 2023-10-30 DIAGNOSIS — D72.829 LEUKOCYTOSIS, UNSPECIFIED TYPE: Primary | ICD-10-CM

## 2023-10-31 ENCOUNTER — TELEPHONE (OUTPATIENT)
Dept: INTERNAL MEDICINE | Facility: CLINIC | Age: 78
End: 2023-10-31
Payer: MEDICARE

## 2023-11-06 NOTE — PROGRESS NOTES
Subjective:      Patient ID: Indira Waters is a 78 y.o. female.    Chief Complaint:  No chief complaint on file.    History of Present Illness  Indira Waters is here for follow up of DM.  Previously seen by me 2023  This is a MyChart video visit.    The patient location is: [place]  The chief complaint leading to consultation is: DM  Visit type: Virtual visit with synchronous audio and video  Total time spent with patient: see below  Each patient to whom he or she provides medical services by telemedicine is:  (1) informed of the relationship between the physician and patient and the respective role of any other health care provider with respect to management of the patient; and (2) notified that he or she may decline to receive medical services by telemedicine and may withdraw from such care at any time.    With regards to Diabetes:    Diagnosed:   Education - last visit: in the past  Known complications:  DKA   RN +  Eye Exam: 2023  PN +  Podiatry: 2021  Nephropathy +  CAD +  Denies history of pancreatitis & personal/family history of medullary thyroid cancer.     Diet/Exercise:  Eats 3 meals a day.   Snacks : PB/jelly   Drinks : water, milk.  Exercise: None   Recent illness, injury, steroids: URI      Current Regimen:  Levemir 20 units in AM and 18 units in PM   Novolog 12 units plus correction scale before meals only  Add correction scale as needed.  Blood sugar 200 to 250 add 1 units  Blood sugar 251 to 300 add 2 units  Blood sugar greater than 301 add 3 units    Reports compliance.     Other medications tried:  Metformin- stopped for unclear reasons but maybe related to kidney disease  Actos  Insulin 70/30  Januvia     Glucose Monitor:   5 times a day testing  Log reviewed: oral recall  Fastins  During the day: 100-200s    Hypoglycemia:  Denies   Knows how to correct with 15 grams of carbs- juice, coke, or a peppermint.     Diabetes Management Status    Hemoglobin A1C   Date Value  Ref Range Status   10/26/2023 9.5 (H) 4.0 - 5.6 % Final     Comment:     ADA Screening Guidelines:  5.7-6.4%  Consistent with prediabetes  >or=6.5%  Consistent with diabetes    High levels of fetal hemoglobin interfere with the HbA1C  assay. Heterozygous hemoglobin variants (HbS, HgC, etc)do  not significantly interfere with this assay.   However, presence of multiple variants may affect accuracy.     05/19/2023 10.5 (H) 4.0 - 5.6 % Final     Comment:     ADA Screening Guidelines:  5.7-6.4%  Consistent with prediabetes  >or=6.5%  Consistent with diabetes    High levels of fetal hemoglobin interfere with the HbA1C  assay. Heterozygous hemoglobin variants (HbS, HgC, etc)do  not significantly interfere with this assay.   However, presence of multiple variants may affect accuracy.     02/03/2023 9.9 (H) 4.0 - 5.6 % Final     Comment:     ADA Screening Guidelines:  5.7-6.4%  Consistent with prediabetes  >or=6.5%  Consistent with diabetes    High levels of fetal hemoglobin interfere with the HbA1C  assay. Heterozygous hemoglobin variants (HbS, HgC, etc)do  not significantly interfere with this assay.   However, presence of multiple variants may affect accuracy.         Statin: Not taking  ACE/ARB: Taking  Screening or Prevention Patient's value Goal Complete/Controlled?   HgA1C Testing and Control   Lab Results   Component Value Date    HGBA1C 9.5 (H) 10/26/2023      Annually/Less than 8% No   Lipid profile : 10/26/2023 Annually Yes   LDL control Lab Results   Component Value Date    LDLCALC 23.0 (L) 10/26/2023    Annually/Less than 100 mg/dl  No   Nephropathy screening Lab Results   Component Value Date    LABMICR 7.0 09/15/2023     Lab Results   Component Value Date    PROTEINUA Negative 12/05/2022    Annually Yes   Blood pressure BP Readings from Last 1 Encounters:   10/26/23 132/62    Less than 140/90 Yes   Dilated retinal exam : 01/10/2023 Annually Yes   Foot exam   Most Recent Foot Exam Date: Not Found Annually Yes  "      Review of Systems  As above    There were no vitals taken for this visit.      There is no height or weight on file to calculate BMI.    Lab Review:   Lab Results   Component Value Date    HGBA1C 9.5 (H) 10/26/2023    HGBA1C 10.5 (H) 05/19/2023    HGBA1C 9.9 (H) 02/03/2023       Lab Results   Component Value Date    CHOL 99 (L) 10/26/2023    HDL 32 (L) 10/26/2023    LDLCALC 23.0 (L) 10/26/2023    TRIG 220 (H) 10/26/2023    CHOLHDL 32.3 10/26/2023     Lab Results   Component Value Date     (L) 10/26/2023    K 3.8 10/26/2023    CL 97 10/26/2023    CO2 25 10/26/2023     (H) 10/26/2023    BUN 20 10/26/2023    CREATININE 1.4 10/26/2023    CALCIUM 9.5 10/26/2023    PROT 7.3 10/26/2023    ALBUMIN 3.8 10/26/2023    BILITOT 0.2 10/26/2023    ALKPHOS 72 10/26/2023    AST 21 10/26/2023    ALT 17 10/26/2023    ANIONGAP 12 10/26/2023    ESTGFRAFRICA 42 (A) 07/14/2022    EGFRNONAA 37 (A) 07/14/2022    TSH 2.432 09/30/2022     Vit D, 25-Hydroxy   Date Value Ref Range Status   05/19/2023 26 (L) 30 - 96 ng/mL Final     Comment:     Vitamin D deficiency.........<10 ng/mL                              Vitamin D insufficiency......10-29 ng/mL       Vitamin D sufficiency........> or equal to 30 ng/mL  Vitamin D toxicity............>100 ng/mL       Assessment and Plan     1. Type 2 diabetes mellitus with hyperglycemia, with long-term current use of insulin  BD ULTRA-FINE HIEN PEN NEEDLE 32 gauge x 5/32" Ndle    insulin aspart U-100 (NOVOLOG FLEXPEN U-100 INSULIN) 100 unit/mL (3 mL) InPn pen    insulin detemir U-100, Levemir, (LEVEMIR FLEXTOUCH U100 INSULIN) 100 unit/mL (3 mL) InPn pen          Type 2 diabetes mellitus with hyperglycemia  -- Sugar clinic in 3 weeks. Labs prior to follow up in ~ 6 weeks.  -- A1c goal < or = 8%.  -- Medications discussed:  MFM - CKD  Insulin   -- Reviewed logs/CGM:  Significant glucose variability.  Will begin documenting on a log.  Instructed to send glucose logs in 7-14 days.  Reach out " to me sooner for any glucose <70 or consistently >200.  -- Submit for Dexcmo G7 to [facility] - not covered. Sent to [facility].  -- Medication Changes:   Levemir 20 units in AM and 18 units in PM   Novolog 14 units plus correction scale before meals only  Add correction scale as needed.  Blood sugar 200 to 250 add 1 units  Blood sugar 251 to 300 add 2 units  Blood sugar greater than 301 add 3 units    -- Reviewed goals of therapy are to get the best control we can without hypoglycemia.  -- Reviewed patient's current insulin regimen. Clarified proper insulin dose and timing in relation to meals, etc. Insulin injection sites and proper rotation instructed.    -- Advised frequent self blood glucose monitoring.  Patient encouraged to document glucose results and bring them to every clinic visit.  -- Hypoglycemia precautions discussed. Instructed on precautions before driving.    -- Call for Bg repeatedly < 90 or > 180.   -- Close adherence to lifestyle changes recommended.   -- Periodic follow ups for eye evaluations, foot care and dental care suggested.        Follow up in about 3 weeks (around 12/4/2023).      I spent 20 minutes face-to-face with the patient, over half of the visit was spent on counseling and/or coordinating the care of the patient.    Counseling includes:  Diagnostic results, impressions, recommendations   Prognosis   Risk and benefits of management/treatment options   Instructions for management treatment and or follow-up   Importance of compliance with management   Risk factor reduction   Patient education

## 2023-11-08 ENCOUNTER — LAB VISIT (OUTPATIENT)
Dept: LAB | Facility: OTHER | Age: 78
End: 2023-11-08
Attending: STUDENT IN AN ORGANIZED HEALTH CARE EDUCATION/TRAINING PROGRAM
Payer: MEDICARE

## 2023-11-08 DIAGNOSIS — D72.829 LEUKOCYTOSIS, UNSPECIFIED TYPE: ICD-10-CM

## 2023-11-08 DIAGNOSIS — K31.84 GASTROPARESIS: ICD-10-CM

## 2023-11-08 LAB
ERYTHROCYTE [DISTWIDTH] IN BLOOD BY AUTOMATED COUNT: 15.5 % (ref 11.5–14.5)
HCT VFR BLD AUTO: 35.5 % (ref 37–48.5)
HGB BLD-MCNC: 11.2 G/DL (ref 12–16)
MCH RBC QN AUTO: 27.9 PG (ref 27–31)
MCHC RBC AUTO-ENTMCNC: 31.5 G/DL (ref 32–36)
MCV RBC AUTO: 89 FL (ref 82–98)
PLATELET # BLD AUTO: 270 K/UL (ref 150–450)
PMV BLD AUTO: 12.5 FL (ref 9.2–12.9)
RBC # BLD AUTO: 4.01 M/UL (ref 4–5.4)
WBC # BLD AUTO: 13.15 K/UL (ref 3.9–12.7)

## 2023-11-08 PROCEDURE — 36415 COLL VENOUS BLD VENIPUNCTURE: CPT | Performed by: STUDENT IN AN ORGANIZED HEALTH CARE EDUCATION/TRAINING PROGRAM

## 2023-11-08 PROCEDURE — 85027 COMPLETE CBC AUTOMATED: CPT | Performed by: STUDENT IN AN ORGANIZED HEALTH CARE EDUCATION/TRAINING PROGRAM

## 2023-11-08 RX ORDER — METOCLOPRAMIDE 5 MG/1
TABLET ORAL
Qty: 30 TABLET | Refills: 3 | Status: SHIPPED | OUTPATIENT
Start: 2023-11-08 | End: 2024-02-11 | Stop reason: SDUPTHER

## 2023-11-08 NOTE — TELEPHONE ENCOUNTER
Care Due:                  Date            Visit Type   Department     Provider  --------------------------------------------------------------------------------                                MYCHART                              FOLLOWUP/OF  [facility] INTERNAL  Last Visit: 10-      FICE VISIT   MEDICINE       Chun Zapata                              ESTABLISHED                              PATIENT -    [facility] INTERNAL  Next Visit: 01-      [facility]       Chun Zapata                                                            Last  Test          Frequency    Reason                     Performed    Due Date  --------------------------------------------------------------------------------    Mg Level....  12 months..  teriparatide.............  05- 05-    [facility] Embedded Care Due Messages. Reference number: [id number].   11/08/2023 11:24:50 AM CST

## 2023-11-10 DIAGNOSIS — M81.0 AGE-RELATED OSTEOPOROSIS WITHOUT CURRENT PATHOLOGICAL FRACTURE: ICD-10-CM

## 2023-11-10 DIAGNOSIS — G89.4 CHRONIC PAIN DISORDER: ICD-10-CM

## 2023-11-10 DIAGNOSIS — G89.4 CHRONIC PAIN SYNDROME: ICD-10-CM

## 2023-11-10 DIAGNOSIS — E08.42 DIABETIC POLYNEUROPATHY ASSOCIATED WITH DIABETES MELLITUS DUE TO UNDERLYING CONDITION: ICD-10-CM

## 2023-11-10 RX ORDER — TERIPARATIDE 250 UG/ML
20 INJECTION, SOLUTION SUBCUTANEOUS DAILY
Qty: 2.48 ML | Refills: 5 | Status: ACTIVE | OUTPATIENT
Start: 2023-11-10

## 2023-11-10 NOTE — TELEPHONE ENCOUNTER
----- Message from Elina Chan sent at 11/10/2023 11:22 AM CST -----  Type: RX Refill Request    Who Called: pt     Have you contacted your pharmacy:    Refill or New Rx:HYDROcodone-acetaminophen (NORCO)  mg per tablet    RX Name and Strength:    How is the patient currently taking it? (ex. 1XDay):    Is this a 30 day or 90 day RX:    Preferred Pharmacy with phone number:  [facility]  [address]  [facility] [address]  Phone: [phone] Fax: [phone]        Local or Mail Order:    Ordering Provider:    Would the patient rather a call back or a response via [facility]? call    Best Call Back Number:[phone] (home) [phone] (work)      Additional Information:

## 2023-11-10 NOTE — TELEPHONE ENCOUNTER
Patient requesting refill on hydrocodone  Last office visit 10-11-23   shows last refill on 10-14-23  Patient does have a pain contract on file with [facility]  Patient last UDS 06-16-23 was consistent with current therapy    CODEINE  Not Detected     Not Detected     MORPHINE  Not Detected     Not Detected     6-ACETYLMORPHINE  Not Detected     Not Detected     OXYCODONE  Not Detected     Not Detected     NOROYXCODONE  Not Detected     Not Detected     OXYMORPHONE  Not Detected     Not Detected     NOROXYMORPHONE  Not Detected     Not Detected     HYDROCODONE  Present     Present     NORHYDROCODONE  Present     Present     HYDROMORPHONE  Present     Present

## 2023-11-13 ENCOUNTER — CLINICAL SUPPORT (OUTPATIENT)
Dept: ENDOCRINOLOGY | Facility: CLINIC | Age: 78
End: 2023-11-13
Payer: MEDICARE

## 2023-11-13 ENCOUNTER — PATIENT MESSAGE (OUTPATIENT)
Dept: ENDOCRINOLOGY | Facility: CLINIC | Age: 78
End: 2023-11-13

## 2023-11-13 DIAGNOSIS — E11.65 TYPE 2 DIABETES MELLITUS WITH HYPERGLYCEMIA, WITH LONG-TERM CURRENT USE OF INSULIN: Primary | Chronic | ICD-10-CM

## 2023-11-13 DIAGNOSIS — E11.65 TYPE 2 DIABETES MELLITUS WITH HYPERGLYCEMIA, WITH LONG-TERM CURRENT USE OF INSULIN: Chronic | ICD-10-CM

## 2023-11-13 DIAGNOSIS — Z79.4 TYPE 2 DIABETES MELLITUS WITH HYPERGLYCEMIA, WITH LONG-TERM CURRENT USE OF INSULIN: Primary | ICD-10-CM

## 2023-11-13 DIAGNOSIS — Z79.4 TYPE 2 DIABETES MELLITUS WITH HYPERGLYCEMIA, WITH LONG-TERM CURRENT USE OF INSULIN: Chronic | ICD-10-CM

## 2023-11-13 DIAGNOSIS — Z79.4 TYPE 2 DIABETES MELLITUS WITH HYPERGLYCEMIA, WITH LONG-TERM CURRENT USE OF INSULIN: Primary | Chronic | ICD-10-CM

## 2023-11-13 DIAGNOSIS — E11.65 TYPE 2 DIABETES MELLITUS WITH HYPERGLYCEMIA, WITH LONG-TERM CURRENT USE OF INSULIN: Primary | ICD-10-CM

## 2023-11-13 PROCEDURE — 99214 OFFICE O/P EST MOD 30 MIN: CPT | Mod: 95,,, | Performed by: INTERNAL MEDICINE

## 2023-11-13 PROCEDURE — 99214 PR OFFICE/OUTPT VISIT, EST, LEVL IV, 30-39 MIN: ICD-10-PCS | Mod: 95,,, | Performed by: INTERNAL MEDICINE

## 2023-11-13 RX ORDER — HYDROCODONE BITARTRATE AND ACETAMINOPHEN 10; 325 MG/1; MG/1
1 TABLET ORAL EVERY 8 HOURS PRN
Qty: 90 TABLET | Refills: 0 | Status: SHIPPED | OUTPATIENT
Start: 2023-11-13 | End: 2023-12-12 | Stop reason: SDUPTHER

## 2023-11-13 RX ORDER — PEN NEEDLE, DIABETIC 31 GX5/16"
NEEDLE, DISPOSABLE MISCELLANEOUS
Qty: 450 EACH | Refills: 3 | Status: SHIPPED | OUTPATIENT
Start: 2023-11-13 | End: 2023-11-16

## 2023-11-13 RX ORDER — PEN NEEDLE, DIABETIC 31 GX5/16"
NEEDLE, DISPOSABLE MISCELLANEOUS
Qty: 450 EACH | Refills: 3 | Status: SHIPPED | OUTPATIENT
Start: 2023-11-13 | End: 2023-11-13 | Stop reason: SDUPTHER

## 2023-11-13 RX ORDER — INSULIN DETEMIR 100 [IU]/ML
INJECTION, SOLUTION SUBCUTANEOUS
Qty: 30 ML | Refills: 3 | Status: SHIPPED | OUTPATIENT
Start: 2023-11-13 | End: 2024-11-12

## 2023-11-13 RX ORDER — INSULIN ASPART 100 [IU]/ML
14 INJECTION, SOLUTION INTRAVENOUS; SUBCUTANEOUS
Qty: 30 ML | Refills: 3 | Status: SHIPPED | OUTPATIENT
Start: 2023-11-13

## 2023-11-13 NOTE — ASSESSMENT & PLAN NOTE
-- Sugar clinic in 3 weeks. Labs prior to follow up in ~ 6 weeks.  -- A1c goal < or = 8%.  -- Medications discussed:  MFM - CKD  Insulin   -- Reviewed logs/CGM:  Significant glucose variability.  Will begin documenting on a log.  Instructed to send glucose logs in 7-14 days.  Reach out to me sooner for any glucose <70 or consistently >200.  -- Submit for Dexcmo G7 to [facility] - not covered. Sent to [facility].  -- Medication Changes:   Levemir 20 units in AM and 18 units in PM   Novolog 14 units plus correction scale before meals only  Add correction scale as needed.  Blood sugar 200 to 250 add 1 units  Blood sugar 251 to 300 add 2 units  Blood sugar greater than 301 add 3 units    -- Reviewed goals of therapy are to get the best control we can without hypoglycemia.  -- Reviewed patient's current insulin regimen. Clarified proper insulin dose and timing in relation to meals, etc. Insulin injection sites and proper rotation instructed.    -- Advised frequent self blood glucose monitoring.  Patient encouraged to document glucose results and bring them to every clinic visit.  -- Hypoglycemia precautions discussed. Instructed on precautions before driving.    -- Call for Bg repeatedly < 90 or > 180.   -- Close adherence to lifestyle changes recommended.   -- Periodic follow ups for eye evaluations, foot care and dental care suggested.

## 2023-11-15 ENCOUNTER — OFFICE VISIT (OUTPATIENT)
Dept: CARDIOLOGY | Facility: CLINIC | Age: 78
End: 2023-11-15
Payer: MEDICARE

## 2023-11-15 VITALS
DIASTOLIC BLOOD PRESSURE: 82 MMHG | OXYGEN SATURATION: 96 % | WEIGHT: 195 LBS | SYSTOLIC BLOOD PRESSURE: 139 MMHG | BODY MASS INDEX: 36.84 KG/M2 | HEART RATE: 86 BPM

## 2023-11-15 DIAGNOSIS — I10 PRIMARY HYPERTENSION: ICD-10-CM

## 2023-11-15 DIAGNOSIS — E78.2 MIXED HYPERLIPIDEMIA: ICD-10-CM

## 2023-11-15 DIAGNOSIS — I70.0 AORTIC ATHEROSCLEROSIS: ICD-10-CM

## 2023-11-15 DIAGNOSIS — I25.118 ATHEROSCLEROSIS OF NATIVE CORONARY ARTERY OF NATIVE HEART WITH STABLE ANGINA PECTORIS: Primary | ICD-10-CM

## 2023-11-15 PROCEDURE — 99214 OFFICE O/P EST MOD 30 MIN: CPT | Mod: S$PBB,,, | Performed by: INTERNAL MEDICINE

## 2023-11-15 PROCEDURE — 99215 OFFICE O/P EST HI 40 MIN: CPT | Mod: PBBFAC | Performed by: INTERNAL MEDICINE

## 2023-11-15 PROCEDURE — 99214 PR OFFICE/OUTPT VISIT, EST, LEVL IV, 30-39 MIN: ICD-10-PCS | Mod: S$PBB,,, | Performed by: INTERNAL MEDICINE

## 2023-11-15 PROCEDURE — 99999 PR PBB SHADOW E&M-EST. PATIENT-LVL V: CPT | Mod: PBBFAC,,, | Performed by: INTERNAL MEDICINE

## 2023-11-15 PROCEDURE — 99999 PR PBB SHADOW E&M-EST. PATIENT-LVL V: ICD-10-PCS | Mod: PBBFAC,,, | Performed by: INTERNAL MEDICINE

## 2023-11-15 NOTE — PROGRESS NOTES
"[facility]    Chief Complaint  Chief Complaint   Patient presents with    Coronary Artery Disease       HPI:    She has been doing well.  More active.  Exercises on her trampoline.  No exertional dyspnea or chest discomfort.  Seems to be doing better with dietary sodium restriction.    Medications  Current Outpatient Medications   Medication Sig Dispense Refill    acetaminophen (TYLENOL) 500 MG tablet Take 2 tablets (1,000 mg total) by mouth every 8 (eight) hours as needed for Pain. (Patient not taking: Reported on 10/26/2023)  0    allopurinoL (ZYLOPRIM) 300 MG tablet Take 1 tablet (300 mg total) by mouth once daily. 90 tablet 1    aspirin (ECOTRIN) 81 MG EC tablet Take 1 tablet (81 mg total) by mouth once daily. 30 tablet 0    BD ULTRA-FINE IHEN PEN NEEDLE 32 gauge x 5/32" Ndle Use with insulin 5 times a day. 450 each 3    blood sugar diagnostic Strp To check BG 6 times daily, to use with insurance preferred meter 200 each 11    blood-glucose meter kit To check BG 6 times daily, to use with insurance preferred meter 1 each 0    blood-glucose meter,continuous (DEXCOM G7 ) Misc Use as directed. 1 each 0    blood-glucose sensor (DEXCOM G7 SENSOR) Nicole Change every 10 days. 3 each 11    cloNIDine (CATAPRES) 0.1 MG tablet Take 1 tablet (0.1 mg total) by mouth 2 (two) times daily. 180 tablet 0    evolocumab (REPATHA SURECLICK) 140 mg/mL PnIj Inject 1 mL (140 mg total) into the skin every 14 (fourteen) days. 8 each 0    fenofibrate micronized (LOFIBRA) 134 MG Cap Take 1 capsule (134 mg total) by mouth daily with breakfast. 90 capsule 3    furosemide (LASIX) 40 MG tablet TAKE ONE TABLET BY MOUTH EVERY DAY 90 tablet 1    HYDROcodone-acetaminophen (NORCO)  mg per tablet Take 1 tablet by mouth every 8 (eight) hours as needed for Pain. 90 tablet 0    hydrocortisone (ANUSOL-HC) 2.5 % rectal cream Procto-Med HC 2.5 % topical cream perineal applicator      insulin aspart U-100 (NOVOLOG FLEXPEN U-100 " INSULIN) 100 unit/mL (3 mL) InPn pen Inject 14 Units into the skin 3 (three) times daily with meals. + meal correction scale. Max 40 units 30 mL 3    insulin detemir U-100, Levemir, (LEVEMIR FLEXTOUCH U100 INSULIN) 100 unit/mL (3 mL) InPn pen Inject 20 Units into the skin once daily AND 18 Units every evening. 30 mL 3    ipratropium-albuteroL (COMBIVENT)  mcg/actuation inhaler Inhale 1 puff into the lungs by mouth every 6 (six) hours. 4 g 0    lancets Misc To check BG 6 times daily, to use with insurance preferred meter 200 each 11    losartan (COZAAR) 50 MG tablet Take 1 tablet (50 mg total) by mouth once daily. 90 tablet 0    methocarbamoL (ROBAXIN) 500 MG Tab Take 1 tablet (500 mg total) by mouth 3 (three) times daily as needed (muscle spasm). 90 tablet 2    metoclopramide HCl (REGLAN) 5 MG tablet TAKE ONE TABLET BY MOUTH FOUR TIMES DAILY AS NEEDED FOR NAUSEA PREVENTION 30 tablet 3    metoprolol succinate (TOPROL-XL) 100 MG 24 hr tablet Take 1 tablet (100 mg total) by mouth once daily. 90 tablet 3    mupirocin (BACTROBAN) 2 % ointment Apply topically 3 (three) times daily as needed (skin breakdown). 22 g 3    NIFEdipine (PROCARDIA-XL) 30 MG (OSM) 24 hr tablet Take 1 tablet (30 mg total) by mouth 2 (two) times a day. 180 tablet 0    nitroGLYCERIN (NITROSTAT) 0.4 MG SL tablet Place 1 tablet (0.4 mg total) under the tongue every 5 (five) minutes as needed for Chest pain. 25 tablet 11    pantoprazole (PROTONIX) 40 MG tablet Take 1 tablet (40 mg total) by mouth once daily. 30 tablet 0    pregabalin (LYRICA) 150 MG capsule TAKE ONE CAPSULE BY MOUTH 3 TIMES DAILY 90 capsule 2    senna-docusate 8.6-50 mg (PERICOLACE) 8.6-50 mg per tablet Take 1 tablet by mouth 2 (two) times daily as needed for Constipation. 60 tablet 0    sertraline (ZOLOFT) 100 MG tablet Take 1 tablet (100 mg total) by mouth once daily. 90 tablet 3    teriparatide 20 mcg/dose (620mcg/2.48mL) PnIj Inject 20 mcg into the skin once daily. 2.48 mL 5     triamcinolone acetonide 0.1% (KENALOG) 0.1 % cream Apply to affected areas of body twice daily as needed rash. Do not use on face, underarms, or groin. (Patient not taking: Reported on 10/26/2023) 454 g 0     No current facility-administered medications for this visit.        History  Past Medical History:   Diagnosis Date    Arthritis     Back pain     Cancer     ovarian    Cervical cancer     Depression     Diabetes mellitus     Fibromyalgia     Heart attack     Hyperlipidemia     Hypertension     Lupus     Stroke     slight left sided weakness     Past Surgical History:   Procedure Laterality Date    APPENDECTOMY       SECTION      2    CORONARY ANGIOGRAPHY N/A 2022    Procedure: ANGIOGRAM, CORONARY ARTERY;  Surgeon: Jose L Méndez MD;  Location: [facility] CATH LAB;  Service: Cardiology;  Laterality: N/A;    HYSTERECTOMY      with USO for cervical cancer    INJECTION OF ANESTHETIC AGENT AROUND NERVE Bilateral 2021    Procedure: BLOCK, NERVE, SYMPATHIC;  Surgeon: Holden Pereira MD;  Location: [facility] PAIN MGT;  Service: Pain Management;  Laterality: Bilateral;    INJECTION OF ANESTHETIC AGENT AROUND NERVE N/A 2021    Procedure: BLOCK, NERVE, SYMPATHETIC  need consent;  Surgeon: Holden Pereira MD;  Location: [facility] PAIN MGT;  Service: Pain Management;  Laterality: N/A;    MN EVAL,SWALLOW FUNCTION,CINE/VIDEO RECORD  2021         TONSILLECTOMY      TRIAL OF SPINAL CORD NERVE STIMULATOR N/A 3/8/2023    Procedure: LUMBAR SPINAL CORD STIMULATOR TRIAL NEVRO REP PATIENT STATES SHE NO LONGER TAKES PLAVIX;  Surgeon: Holden Pereira MD;  Location: [facility] PAIN MGT;  Service: Pain Management;  Laterality: N/A;     Social History     Socioeconomic History    Marital status:    Tobacco Use    Smoking status: Former     Current packs/day: 0.00     Types: Cigarettes     Quit date: 2020     Years since quitting: 3.0     Passive exposure: Past    Smokeless tobacco: Never   Substance and  Sexual Activity    Alcohol use: Not Currently     Comment: occasionally    Drug use: Yes     Types: Hydrocodone     Comment: three times a day    Sexual activity: Not Currently   Social History Narrative    Lives with daughter. .      Social Determinants of Health     Financial Resource Strain: Low Risk  (5/5/2022)    Overall Financial Resource Strain (CARDIA)     Difficulty of Paying Living Expenses: Not hard at all   Food Insecurity: No Food Insecurity (5/5/2022)    Hunger Vital Sign     Worried About Running Out of Food in the Last Year: Never true     Ran Out of Food in the Last Year: Never true   Transportation Needs: No Transportation Needs (5/5/2022)    PRAPARE - Transportation     Lack of Transportation (Medical): No     Lack of Transportation (Non-Medical): No   Physical Activity: Insufficiently Active (5/5/2022)    Exercise Vital Sign     Days of Exercise per Week: 3 days     Minutes of Exercise per Session: 20 min   Stress: No Stress Concern Present (5/5/2022)    [employer] - Occupational Stress Questionnaire     Feeling of Stress : Not at all   Social Connections: Socially Isolated (5/5/2022)    Social Connection and Isolation Panel [NHANES]     Frequency of Communication with Friends and Family: More than three times a week     Frequency of Social Gatherings with Friends and Family: Never     Attends [employer] Services: Never     Active Member of Clubs or Organizations: No     Attends Club or Organization Meetings: Never     Marital Status:    Housing Stability: Low Risk  (5/5/2022)    Housing Stability Vital Sign     Unable to Pay for Housing in the Last Year: No     Number of Places Lived in the Last Year: 1     Unstable Housing in the Last Year: No     Family History   Problem Relation Age of Onset    COPD Mother     Lupus Mother     Hernia Mother     Uterine cancer Mother         vs cervical cancer    Ovarian cancer Mother     Diabetes Father     Coronary  artery disease Father     Colon cancer Maternal Grandmother         in her 50's        Allergies  Review of patient's allergies indicates:   Allergen Reactions    Bleach (sodium hypochlorite) Shortness Of Breath    Nitrofurantoin macrocrystalline Anaphylaxis    Lipitor [atorvastatin] Diarrhea and Rash    Nsaids (non-steroidal anti-inflammatory drug)     Pcn [penicillins]     Toradol [ketorolac]        Review of Systems   Review of Systems   Constitutional: Negative for chills, fever and malaise/fatigue.   HENT:  Negative for nosebleeds.    Eyes:  Negative for blurred vision, double vision, vision loss in left eye and vision loss in right eye.   Cardiovascular:  Positive for paroxysmal nocturnal dyspnea. Negative for chest pain, claudication, dyspnea on exertion, irregular heartbeat, leg swelling, near-syncope, orthopnea, palpitations and syncope.   Respiratory:  Negative for cough, hemoptysis, shortness of breath and wheezing.    Endocrine: Negative for cold intolerance and heat intolerance.   Hematologic/Lymphatic: Negative for bleeding problem. Does not bruise/bleed easily.   Skin:  Negative for color change.   Musculoskeletal:  Negative for falls, muscle weakness and myalgias.   Gastrointestinal:  Negative for abdominal pain, heartburn, hematemesis, hematochezia, hemorrhoids, jaundice, melena, nausea and vomiting.   Genitourinary:  Negative for dysuria, hematuria and nocturia.   Neurological:  Negative for dizziness, focal weakness, headaches, light-headedness, loss of balance, numbness, vertigo and weakness.   Psychiatric/Behavioral:  Negative for altered mental status, depression and memory loss. The patient is not nervous/anxious.    Allergic/Immunologic: Negative for hives and persistent infections.       Physical Exam  Vitals:    11/15/23 1539   BP: 139/82   Pulse:      Wt Readings from Last 1 Encounters:   11/15/23 88.5 kg (195 lb)     Physical Exam  Vitals and nursing note reviewed.   Constitutional:        General: She is not in acute distress.     Appearance: She is obese. She is not toxic-appearing or diaphoretic.   HENT:      Head: Normocephalic and atraumatic.      Mouth/Throat:      Lips: Pink.      Mouth: Mucous membranes are moist.   Eyes:      General: No scleral icterus.     Conjunctiva/sclera: Conjunctivae normal.   Neck:      Thyroid: No thyromegaly.      Vascular: No carotid bruit, hepatojugular reflux or JVD.      Trachea: Trachea normal.   Cardiovascular:      Rate and Rhythm: Normal rate and regular rhythm. No extrasystoles are present.     Chest Wall: PMI is not displaced.      Pulses:           Carotid pulses are 2+ on the right side and 2+ on the left side.       Radial pulses are 2+ on the right side and 2+ on the left side.      Heart sounds: S1 normal and S2 normal. No murmur heard.     No friction rub. No S3 or S4 sounds.   Pulmonary:      Effort: Pulmonary effort is normal. No accessory muscle usage or respiratory distress.      Breath sounds: Normal breath sounds and air entry. No decreased breath sounds, wheezing, rhonchi or rales.   Abdominal:      General: Bowel sounds are normal. There is no distension or abdominal bruit.      Palpations: Abdomen is soft. There is no hepatomegaly, splenomegaly or pulsatile mass.      Tenderness: There is no abdominal tenderness.   Musculoskeletal:         General: No tenderness or deformity.      Right lower leg: No edema.      Left lower leg: No edema.   Skin:     General: Skin is warm and dry.      Capillary Refill: Capillary refill takes less than 2 seconds.      Coloration: Skin is not cyanotic or pale.      Nails: There is no clubbing.   Neurological:      General: No focal deficit present.      Mental Status: She is alert and oriented to person, place, and time.   Psychiatric:         Attention and Perception: Attention normal.         Mood and Affect: Mood normal.         Speech: Speech normal.         Behavior: Behavior normal. Behavior is  [facility].         Labs  Lab Visit on 11/08/2023   Component Date Value Ref Range Status    WBC 11/08/2023 13.15 (H)  3.90 - 12.70 K/uL Final    RBC 11/08/2023 4.01  4.00 - 5.40 M/uL Final    Hemoglobin 11/08/2023 11.2 (L)  12.0 - 16.0 g/dL Final    Hematocrit 11/08/2023 35.5 (L)  37.0 - 48.5 % Final    MCV 11/08/2023 89  82 - 98 fL Final    MCH 11/08/2023 27.9  27.0 - 31.0 pg Final    MCHC 11/08/2023 31.5 (L)  32.0 - 36.0 g/dL Final    RDW 11/08/2023 15.5 (H)  11.5 - 14.5 % Final    Platelets 11/08/2023 270  150 - 450 K/uL Final    MPV 11/08/2023 12.5  9.2 - 12.9 fL Final   Lab Visit on 10/26/2023   Component Date Value Ref Range Status    Hemoglobin A1C 10/26/2023 9.5 (H)  4.0 - 5.6 % Final    Comment: ADA Screening Guidelines:  5.7-6.4%  Consistent with prediabetes  >or=6.5%  Consistent with diabetes    High levels of fetal hemoglobin interfere with the HbA1C  assay. Heterozygous hemoglobin variants (HbS, HgC, etc)do  not significantly interfere with this assay.   However, presence of multiple variants may affect accuracy.      Estimated Avg Glucose 10/26/2023 226 (H)  68 - 131 mg/dL Final    Sodium 10/26/2023 134 (L)  136 - 145 mmol/L Final    Potassium 10/26/2023 3.8  3.5 - 5.1 mmol/L Final    Chloride 10/26/2023 97  95 - 110 mmol/L Final    CO2 10/26/2023 25  23 - 29 mmol/L Final    Glucose 10/26/2023 301 (H)  70 - 110 mg/dL Final    BUN 10/26/2023 20  8 - 23 mg/dL Final    Creatinine 10/26/2023 1.4  0.5 - 1.4 mg/dL Final    Calcium 10/26/2023 9.5  8.7 - 10.5 mg/dL Final    Total Protein 10/26/2023 7.3  6.0 - 8.4 g/dL Final    Albumin 10/26/2023 3.8  3.5 - 5.2 g/dL Final    Total Bilirubin 10/26/2023 0.2  0.1 - 1.0 mg/dL Final    Comment: For infants and newborns, interpretation of results should be based  on gestational age, weight and in agreement with clinical  observations.    Premature Infant recommended reference ranges:  Up to 24 hours.............<8.0 mg/dL  Up to 48 hours............<12.0 mg/dL  3-5  days..................<15.0 mg/dL  6-29 days.................<15.0 mg/dL      Alkaline Phosphatase 10/26/2023 72  55 - 135 U/L Final    AST 10/26/2023 21  10 - 40 U/L Final    ALT 10/26/2023 17  10 - 44 U/L Final    eGFR 10/26/2023 39 (A)  >60 mL/min/1.73 m^2 Final    Anion Gap 10/26/2023 12  8 - 16 mmol/L Final    WBC 10/26/2023 13.34 (H)  3.90 - 12.70 K/uL Final    RBC 10/26/2023 4.02  4.00 - 5.40 M/uL Final    Hemoglobin 10/26/2023 11.2 (L)  12.0 - 16.0 g/dL Final    Hematocrit 10/26/2023 34.8 (L)  37.0 - 48.5 % Final    MCV 10/26/2023 87  82 - 98 fL Final    MCH 10/26/2023 27.9  27.0 - 31.0 pg Final    MCHC 10/26/2023 32.2  32.0 - 36.0 g/dL Final    RDW 10/26/2023 15.3 (H)  11.5 - 14.5 % Final    Platelets 10/26/2023 286  150 - 450 K/uL Final    MPV 10/26/2023 11.9  9.2 - 12.9 fL Final    Immature Granulocytes 10/26/2023 0.7 (H)  0.0 - 0.5 % Final    Gran # (ANC) 10/26/2023 8.8 (H)  1.8 - 7.7 K/uL Final    Immature Grans (Abs) 10/26/2023 0.09 (H)  0.00 - 0.04 K/uL Final    Comment: Mild elevation in immature granulocytes is non specific and   can be seen in a variety of conditions including stress response,   acute inflammation, trauma and pregnancy. Correlation with other   laboratory and clinical findings is essential.      Lymph # 10/26/2023 3.3  1.0 - 4.8 K/uL Final    Mono # 10/26/2023 0.7  0.3 - 1.0 K/uL Final    Eos # 10/26/2023 0.4  0.0 - 0.5 K/uL Final    Baso # 10/26/2023 0.06  0.00 - 0.20 K/uL Final    nRBC 10/26/2023 0  0 /100 WBC Final    Gran % 10/26/2023 66.3  38.0 - 73.0 % Final    Lymph % 10/26/2023 24.5  18.0 - 48.0 % Final    Mono % 10/26/2023 5.5  4.0 - 15.0 % Final    Eosinophil % 10/26/2023 2.6  0.0 - 8.0 % Final    Basophil % 10/26/2023 0.4  0.0 - 1.9 % Final    Differential Method 10/26/2023 Automated   Final    Cholesterol 10/26/2023 99 (L)  120 - 199 mg/dL Final    Comment: The National Cholesterol Education Program (NCEP) has set the  following guidelines (reference ranges) for  Cholesterol:  Optimal.....................<200 mg/dL  Borderline High.............200-239 mg/dL  High........................> or = 240 mg/dL      Triglycerides 10/26/2023 220 (H)  30 - 150 mg/dL Final    Comment: The National Cholesterol Education Program (NCEP) has set the  following guidelines (reference values) for triglycerides:  Normal......................<150 mg/dL  Borderline High.............150-199 mg/dL  High........................200-499 mg/dL      HDL 10/26/2023 32 (L)  40 - 75 mg/dL Final    Comment: The National Cholesterol Education Program (NCEP) has set the  following guidelines (reference values) for HDL Cholesterol:  Low...............<40 mg/dL  Optimal...........>60 mg/dL      LDL Cholesterol 10/26/2023 23.0 (L)  63.0 - 159.0 mg/dL Final    Comment: The National Cholesterol Education Program (NCEP) has set the  following guidelines (reference values) for LDL Cholesterol:  Optimal.......................<130 mg/dL  Borderline High...............130-159 mg/dL  High..........................160-189 mg/dL  Very High.....................>190 mg/dL      HDL/Cholesterol Ratio 10/26/2023 32.3  20.0 - 50.0 % Final    Total Cholesterol/HDL Ratio 10/26/2023 3.1  2.0 - 5.0 Final    Non-HDL Cholesterol 10/26/2023 67  mg/dL Final    Comment: Risk category and Non-HDL cholesterol goals:  Coronary heart disease (CHD)or equivalent (10-year risk of CHD >20%):  Non-HDL cholesterol goal     <130 mg/dL  Two or more CHD risk factors and 10-year risk of CHD <= 20%:  Non-HDL cholesterol goal     <160 mg/dL  0 to 1 CHD risk factor:  Non-HDL cholesterol goal     <190 mg/dL     Lab Visit on 09/15/2023   Component Date Value Ref Range Status    Microalbumin, Urine 09/15/2023 7.0  ug/mL Final    Creatinine, Urine 09/15/2023 24.0  15.0 - 325.0 mg/dL Final    Microalb/Creat Ratio 09/15/2023 29.2  0.0 - 30.0 ug/mg Final   Office Visit on 06/16/2023   Component Date Value Ref Range Status    DRUGS EXPECTED 06/16/2023 See  Interp   Final    PAIN MANAGEMENT DRUG PANEL INTERP 06/16/2023 See Note   Final    Comment: ____________________________________________________________  ____  NO DRUGS PROVIDED  ____________________________________________________________  ____  Please call [facility] Client Services at   [phone] for Medical Director interpretation if   applicable. Alternatively, please consider the TARGETED   DRUG PROF, MASS SPEC/EMIT, UR ([id number]) which does not   require medication information or provide compliance   interpretation.  ____________________________________________________________  ____  INTERPRETIVE INFORMATION: Targeted drug profile Interp  Interpretation depends on accuracy and completeness of   patient medication information submitted by client.      CODEINE 06/16/2023 Not Detected   Final    MORPHINE 06/16/2023 Not Detected   Final    6-ACETYLMORPHINE 06/16/2023 Not Detected   Final    OXYCODONE 06/16/2023 Not Detected   Final    NOROYXCODONE 06/16/2023 Not Detected   Final    OXYMORPHONE 06/16/2023 Not Detected   Final    NOROXYMORPHONE 06/16/2023 Not Detected   Final    HYDROCODONE 06/16/2023 Present   Final    NORHYDROCODONE 06/16/2023 Present   Final    HYDROMORPHONE 06/16/2023 Present   Final    BUPRENORPHINE 06/16/2023 Not Detected   Final    NORUBPRENORPHINE 06/16/2023 Not Detected   Final    FENTANYL 06/16/2023 Not Detected   Final    NORFENTANYL 06/16/2023 Not Detected   Final    MEPERIDINE METABOLITE 06/16/2023 Not Detected   Final    TAPENTADOL 06/16/2023 Not Detected   Final    TAPENTADOL-O-SULF 06/16/2023 Not Detected   Final    METHADONE 06/16/2023 Not Detected   Final    TRAMADOL 06/16/2023 Not Detected   Final    AMPHETAMINE 06/16/2023 Not Detected   Final    METHAMPHETAMINE 06/16/2023 Not Detected   Final    MDMA- ECSTASY 06/16/2023 Not Detected   Final    MDA 06/16/2023 Not Detected   Final    MDEA- Mary 06/16/2023 Not Detected   Final    METHYLPHENIDATE 06/16/2023 Not Detected    Final    PHENTERMINE 06/16/2023 Not Detected   Final    BENZOYLECGONINE 06/16/2023 Not Detected   Final    ALPRAZOLAM 06/16/2023 Not Detected   Final    ALPHA-OH-ALPRAZOLAM 06/16/2023 Not Detected   Final    CLONAZEPAM 06/16/2023 Not Detected   Final    7-AMINOCLONAZEPAM 06/16/2023 Not Detected   Final    DIAZEPAM 06/16/2023 Not Detected   Final    NORDIAZEPAM 06/16/2023 Not Detected   Final    OXAZEPAM 06/16/2023 Not Detected   Final    TEMAZEPAM 06/16/2023 Not Detected   Final    Lorazepam 06/16/2023 Not Detected   Final    MIDAZOLAM 06/16/2023 Not Detected   Final    ZOLPIDEM 06/16/2023 Not Detected   Final    BARBITURATES 06/16/2023 Not Detected   Final    Creatinine, Urine 06/16/2023 21.1  20.0 - 400.0 mg/dL Final    ETHYL GLUCURONIDE 06/16/2023 Not Detected   Final    MARIJUANA METABOLITE 06/16/2023 Not Detected   Final    Comment: INTERPRETIVE INFORMATION: Marijuana Metabolite  The cutoff for Marijuana Metabolite (immunoassay) was   changed from 20 ng/mL to 50 ng/mL,  effective May 15, 2023.      PCP 06/16/2023 Not Detected   Final    CARISOPRODOL 06/16/2023 Not Detected   Final    Comment: The carisoprodol immunoassay has cross-reactivity to   carisoprodol and meprobamate.      Naloxone 06/16/2023 Not Detected   Final    Gabapentin 06/16/2023 Not Detected   Final    Pregabalin 06/16/2023 Present   Final    Alpha-OH-Midazolam 06/16/2023 Not Detected   Final    Zolpidem Metabolite 06/16/2023 Not Detected   Final    PAIN MANAGEMENT DRUG PANEL 06/16/2023 See Below   Final    Comment: Methodology: Qualitative Enzyme Immunoassay and Qualitative   Liquid Chromatography-Tandem Mass Spectrometry,   Quantitative Spectrophotometry  The absence of expected drug(s) and/or drug metabolite(s)   may indicate non-compliance, inappropriate timing of   specimen collection relative to drug administration, poor   drug absorption, diluted/adulterated urine, or limitations   of testing. The concentration must be greater than or  equal   to the cutoff to be reported as present.  If specific drug   concentrations are required, contact the laboratory within   two weeks of specimen collection to request quantification   by a second analytical technique. Interpretive questions   should be directed to the laboratory.  Results based on immunoassay detection that do not match   clinical expectations should be  interpreted with caution. Confirmatory testing by mass   spectrometry for immunoassay-based results is available, if   ordered within two weeks of specimen collection. Additional   kathrin                           ges apply.  For medical purposes only; not valid for forensic use.  This test was developed and its performance characteristics   determined by [facility]. It has not been cleared or   approved by the US Food and Drug Administration. This test   was performed in a CLIA certified laboratory and is   intended for clinical purposes.      EER PAIN MGT HAYS,HIGH RES/EMIT, IN* 06/16/2023 See Note   Final    Comment: Authorized individuals can access the [facility]   Enhanced Report using the following link:     [email or web address]  Performed By: [facility]  [address] [place] [id number]  : Malcolm Mclaughlin MD, PhD     Lab Visit on 05/19/2023   Component Date Value Ref Range Status    Fructosamine 05/19/2023 480  SeeBelow umol /L Final    Comment: There is no established reference range for patients aged  less than 19 years or greater than 65 years.    Test performed at [facility],  [facility] [place] [address] [place]  [address] [phone]  Carol Ann Holt MD, PhD - Medical Director      Hemoglobin A1C 05/19/2023 10.5 (H)  4.0 - 5.6 % Final    Comment: ADA Screening Guidelines:  5.7-6.4%  Consistent with prediabetes  >or=6.5%  Consistent with diabetes    High levels of fetal hemoglobin interfere with the HbA1C  assay. Heterozygous hemoglobin variants (HbS,  HgC, etc)do  not significantly interfere with this assay.   However, presence of multiple variants may affect accuracy.      Estimated Avg Glucose 05/19/2023 255 (H)  68 - 131 mg/dL Final    Vit D, 25-Hydroxy 05/19/2023 26 (L)  30 - 96 ng/mL Final    Comment: Vitamin D deficiency.........<10 ng/mL                              Vitamin D insufficiency......10-29 ng/mL       Vitamin D sufficiency........> or equal to 30 ng/mL  Vitamin D toxicity............>100 ng/mL      Glucose 05/19/2023 142 (H)  70 - 110 mg/dL Final    Sodium 05/19/2023 139  136 - 145 mmol/L Final    Potassium 05/19/2023 4.0  3.5 - 5.1 mmol/L Final    Chloride 05/19/2023 97  95 - 110 mmol/L Final    CO2 05/19/2023 29  23 - 29 mmol/L Final    BUN 05/19/2023 28 (H)  8 - 23 mg/dL Final    Calcium 05/19/2023 9.7  8.7 - 10.5 mg/dL Final    Creatinine 05/19/2023 1.6 (H)  0.5 - 1.4 mg/dL Final    Albumin 05/19/2023 3.7  3.5 - 5.2 g/dL Final    Phosphorus 05/19/2023 2.6 (L)  2.7 - 4.5 mg/dL Final    eGFR 05/19/2023 33 (A)  >60 mL/min/1.73 m^2 Final    Anion Gap 05/19/2023 13  8 - 16 mmol/L Final       Imaging  No results found.    Assessment  1. Atherosclerosis of native coronary artery of native heart with stable angina pectoris  Stable on current medical regimen    2. Primary hypertension  Better control    3. Mixed hyperlipidemia  Controlled    4. Aortic atherosclerosis  Continue risk factor modification efforts      Plan and Discussion    No change to current guideline directed medical therapy.  Keep working on diabetes control    The ASCVD Risk score (Annamarie DK, et al., 2019) failed to calculate for the following reasons:    The valid total cholesterol range is 130 to 320 mg/dL     Follow Up  Follow up in about 6 months (around 5/15/2024).      Jose L Méndez MD

## 2023-11-16 RX ORDER — PEN NEEDLE, DIABETIC 29 G X1/2"
1 NEEDLE, DISPOSABLE MISCELLANEOUS
Qty: 500 EACH | Refills: 3 | Status: SHIPPED | OUTPATIENT
Start: 2023-11-16

## 2023-11-20 DIAGNOSIS — G89.4 CHRONIC PAIN SYNDROME: Primary | ICD-10-CM

## 2023-11-20 DIAGNOSIS — G89.4 CHRONIC PAIN DISORDER: ICD-10-CM

## 2023-11-20 DIAGNOSIS — E08.42 DIABETIC POLYNEUROPATHY ASSOCIATED WITH DIABETES MELLITUS DUE TO UNDERLYING CONDITION: ICD-10-CM

## 2023-11-20 RX ORDER — METHOCARBAMOL 500 MG/1
500 TABLET, FILM COATED ORAL 3 TIMES DAILY PRN
Qty: 90 TABLET | Refills: 2 | Status: SHIPPED | OUTPATIENT
Start: 2023-11-20 | End: 2024-02-05

## 2023-11-28 NOTE — DISCHARGE SUMMARY
Diagnosis:   S/p left index finger A1 pulley release on 10/12/23   Referring Provider: Michelle Chairez  Date of Evaluation:    10/20/2023    Precautions:  None Next MD visit: as needed  Date of Surgery: n/a   Insurance Primary/Secondary: [employer] [place] PPO / N/A     # Auth Visits: 8 POC     Subjective: Left hand is feeling better, not as stiff. Right hand tingling persists. Current Pain: 0/10    Objective:   Strength (lbs) Right Left    : 59 (59 pain onset) 60 (pain onset 60)  End of session: 60 (reports less intense pain)     AROM left HAND:    IF   MP 0 - 70 (70)   PIP 0 - 90   DIP 0 - 55 (55)     Assessment: Continued progress with left index finger mobility. Less irritability over scar. Discussed further consult with surgeon regarding right hand symptoms. Goals: (to be met in 8 visits)   Pt will improve L  strength to >30 lbs to be able to open a jar without difficulty   Pt will demonstrate improved be able to carry >10 # pain free   Pt will improve left index finger PIP flexion to >/= 80 deg  Pt will be independent and compliant with comprehensive HEP to maintain progress achieved in PT    Plan: Continue PT. Progress hand/UE resistive exercises.     Date:   11/3/2023  TX#: 2 Date:  11/6/2023  TX#: 3 Date:  11/14/2023  TX#: 4 Date:  11/16/2023  TX#: 5 Date:  11/20/2023  TX#: 6 Date:  11/28/2023  TX#: 7 Date:  TX#: 8   TherEx:  -Left 2nd digit PIP, DIP towel  x20 reps  -Left 2nd digit flexion OP x20 reps  -Towel gripping x20 reps  -Right 3rd digit extension with MP PA overpressure x20 reps TherEx:  -Left 2nd digit PIP, DIP towel  x20 reps  -Left 2nd digit flexion OP x20 reps  -Towel gripping x20 reps  -Right 3rd digit extension with MP PA overpressure x20 reps  -Knee screen x5 min TherEx:  -Left 2nd digit PIP, DIP towel  x20 reps  -Left 2nd digit flexion OP x20 reps  -Left hand tendon gliding x20 reps  -Full , yellow putty, 5x30 sec  -Lumbrical , yellow putty, 5x30 sec TherEx:  -Left 2nd digit PIP, Discharge Note  Short Stay      SUMMARY     Admit Date: 3/8/2023    Attending Physician: Holden Pereira    Discharge Physician: Clarisse Haynes      Discharge Date: 3/8/2023 9:09 AM    Procedure(s) (LRB):  SPINAL CORD STIMULATOR TRIAL NEVRO REP PATIENT STATES SHE NO LONGER TAKES PLAVIX (N/A)    Final Diagnosis: Chronic pain syndrome [G89.4]  Radiculopathy of thoracolumbar region [M54.15]    Disposition: Home or self care    Patient Instructions:   Current Discharge Medication List        CONTINUE these medications which have NOT CHANGED    Details   acetaminophen (TYLENOL) 500 MG tablet Take 2 tablets (1,000 mg total) by mouth every 8 (eight) hours as needed for Pain.  Refills: 0      allopurinoL (ZYLOPRIM) 300 MG tablet Take 1 tablet (300 mg total) by mouth once daily.  Qty: 90 tablet, Refills: 1    Associated Diagnoses: Hyperuricemia      aspirin (ECOTRIN) 81 MG EC tablet Take 1 tablet (81 mg total) by mouth once daily.  Qty: 30 tablet, Refills: 0      atorvastatin (LIPITOR) 40 MG tablet atorvastatin 40 mg tablet      blood sugar diagnostic Strp 1 each by Misc.(Non-Drug; Combo Route) route 4 (four) times daily.  Qty: 200 each, Refills: 0      blood-glucose meter Misc Follow package directions  Qty: 1 each, Refills: 0      blood-glucose meter,continuous (DEXCOM G6 ) Misc 1 each by Misc.(Non-Drug; Combo Route) route continuous prn.  Qty: 1 each, Refills: PRN    Associated Diagnoses: Type 2 diabetes mellitus with hyperglycemia, with long-term current use of insulin      blood-glucose sensor (DEXCOM G6 SENSOR) Nicole 3 each by Misc.(Non-Drug; Combo Route) route continuous prn. Change every 10 days.  Qty: 3 each, Refills: PRN    Associated Diagnoses: Type 2 diabetes mellitus with hyperglycemia, with long-term current use of insulin      blood-glucose transmitter (DEXCOM G6 TRANSMITTER) Nicole 1 each by Misc.(Non-Drug; Combo Route) route continuous prn.  Qty: 1 each, Refills: PRN    Associated Diagnoses: Type 2  DIP towel  x20 reps  -Left 2nd digit flexion OP x20 reps  -Left hand tendon gliding x20 reps TherEx:  -Left 2nd digit PIP, DIP towel  x20 reps  -Left 2nd digit flexion OP x20 reps  -Left hand tendon gliding x20 reps  -Right pronator stretch 6x20 sec hold TherEx:  -Left 2nd digit PIP, DIP towel  x20 reps  -Left 2nd digit flexion OP x20 reps  -Left hand tendon gliding x20 reps      Manual:  -2nd digit scar mobilization x15 min  -2nd digit MP, PIP, DIP gr III mob x15 min Manual:  -2nd digit scar mobilization x15 min  -2nd digit MP, PIP, DIP gr III mob 10 min  -Wrist extension with PA gr III mobilization x5 min Manual:  -2nd digit IASTM x15 min  -2nd digit MP, PIP, DIP gr III mob 10 min  -Wrist extension with PA gr III mobilization x5 min Manual:  -2nd digit IASTM x15 min  -2nd digit MP, PIP, DIP gr III mob 10 min  -R pronator teres STM x5 min Manual:  -2nd digit IASTM x15 min  -2nd digit MP, PIP, DIP gr III mob 10 min  -R pronator teres STM x5 min Manual:  -2nd digit IASTM x15 min  -2nd digit MP, PIP, DIP gr III mob 10 min  -R pronator teres STM x5 min      HEP: scar mobilization, left 2nd digit flexion OP, putty gripping (full fist, lumbrical ), tendon gliding    Charges:  TherEx x1 (10 min), Manual x2 (30 min)       Total Timed Treatment: 40 min  Total Treatment Time: 40 min diabetes mellitus with hyperglycemia, with long-term current use of insulin      cloNIDine (CATAPRES) 0.1 MG tablet Take 1 tablet (0.1 mg total) by mouth 2 (two) times daily.  Qty: 180 tablet, Refills: 0    Comments: .  Associated Diagnoses: Essential hypertension      evolocumab (REPATHA SURECLICK) 140 mg/mL PnIj Inject 1 mL (140 mg total) into the skin every 14 (fourteen) days.  Qty: 8 each, Refills: 3    Associated Diagnoses: Familial hypercholesterolemia; 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 pectoris; Coronary artery calcification      fenofibrate micronized (LOFIBRA) 134 MG Cap Take 1 capsule (134 mg total) by mouth daily with breakfast.  Qty: 90 capsule, Refills: 3    Associated Diagnoses: Hypertriglyceridemia      furosemide (LASIX) 40 MG tablet Take 1 tablet (40 mg total) by mouth once daily.  Qty: 30 tablet, Refills: 6      HYDROcodone-acetaminophen (NORCO)  mg per tablet Take 1 tablet by mouth every 8 (eight) hours as needed for Pain.  Qty: 90 tablet, Refills: 0    Comments: Quantity prescribed more than 7 day supply? Yes, quantity medically necessary  Associated Diagnoses: Chronic pain disorder; Diabetic polyneuropathy associated with diabetes mellitus due to underlying condition; Chronic pain syndrome      hydrocortisone (ANUSOL-HC) 2.5 % rectal cream Procto-Med HC 2.5 % topical cream perineal applicator      insulin aspart U-100 (NOVOLOG) 100 unit/mL (3 mL) InPn pen Inject 12 Units into the skin 3 (three) times daily with meals. Plus correction scale. Max TDD 40units.  Qty: 15 mL, Refills: 3    Associated Diagnoses: Type 2 diabetes mellitus with other circulatory complication, without long-term current use of insulin      insulin detemir U-100 (LEVEMIR FLEXTOUCH U-100 INSULN) 100 unit/mL (3 mL) InPn pen Inject 20 Units into the skin 2 (two) times daily.  Qty: 15 mL, Refills: 3    Associated Diagnoses: Type 2 diabetes mellitus with other circulatory  "complication, without long-term current use of insulin      ipratropium-albuteroL (COMBIVENT)  mcg/actuation inhaler Inhale 1 puff into the lungs by mouth every 6 (six) hours.  Qty: 4 g, Refills: 0      losartan (COZAAR) 50 MG tablet Take 1 tablet (50 mg total) by mouth once daily.  Qty: 90 tablet, Refills: 0    Comments: .      methocarbamoL (ROBAXIN) 500 MG Tab Take 1 tablet (500 mg total) by mouth 3 (three) times daily as needed (muscle spasm).  Qty: 90 tablet, Refills: 2    Associated Diagnoses: Chronic pain disorder; Diabetic polyneuropathy associated with diabetes mellitus due to underlying condition; Chronic pain syndrome      metoclopramide HCl (REGLAN) 5 MG tablet Take 1 tablet (5 mg total) by mouth 4 (four) times daily as needed (nausea, prevention).  Qty: 30 tablet, Refills: 3    Associated Diagnoses: Gastroparesis      metoprolol succinate (TOPROL-XL) 100 MG 24 hr tablet Take 1 tablet (100 mg total) by mouth once daily.  Qty: 90 tablet, Refills: 3    Comments: .  Associated Diagnoses: Essential hypertension      NIFEdipine (PROCARDIA-XL) 30 MG (OSM) 24 hr tablet Take 1 tablet (30 mg total) by mouth 2 (two) times a day.  Qty: 180 tablet, Refills: 0    Comments: .      nitroGLYCERIN (NITROSTAT) 0.4 MG SL tablet Place 1 tablet (0.4 mg total) under the tongue every 5 (five) minutes as needed for Chest pain.  Qty: 25 tablet, Refills: 5    Associated Diagnoses: Angina at rest      pantoprazole (PROTONIX) 40 MG tablet Take 1 tablet (40 mg total) by mouth once daily.  Qty: 30 tablet, Refills: 0      pen needle, diabetic (BD ULTRA-FINE HIEN PEN NEEDLE) 32 gauge x 5/32" Ndle 1 each by Misc.(Non-Drug; Combo Route) route 4 (four) times daily.  Qty: 400 each, Refills: 3    Associated Diagnoses: Type 2 diabetes mellitus with hyperglycemia, with long-term current use of insulin      pregabalin (LYRICA) 150 MG capsule Take 1 capsule (150 mg total) by mouth 3 (three) times daily.  Qty: 90 capsule, Refills: 2    " Associated Diagnoses: Chronic pain disorder      senna-docusate 8.6-50 mg (PERICOLACE) 8.6-50 mg per tablet Take 1 tablet by mouth 2 (two) times daily as needed for Constipation.  Qty: 60 tablet, Refills: 0      sertraline (ZOLOFT) 100 MG tablet Take 1 tablet (100 mg total) by mouth once daily.  Qty: 90 tablet, Refills: 3    Associated Diagnoses: Anxiety disorder, unspecified type      sulfamethoxazole-trimethoprim 800-160mg (BACTRIM DS) 800-160 mg Tab sulfamethoxazole 800 mg-trimethoprim 160 mg tablet      teriparatide 20 mcg/dose (620mcg/2.48mL) PnIj Inject 20 mcg into the skin once daily.  Qty: 2.48 mL, Refills: 11    Associated Diagnoses: Age-related osteoporosis without current pathological fracture      triamcinolone acetonide 0.1% (KENALOG) 0.1 % cream Apply to affected areas of body twice daily as needed rash. Do not use on face, underarms, or groin.  Qty: 454 g, Refills: 0    Associated Diagnoses: Palpable purpura      TRUEPLUS LANCETS 30 gauge Misc TEST AS DIRECTED FOUR TIMES DAILY  Qty: 200 each, Refills: 2                 Discharge Diagnosis: Chronic pain syndrome [G89.4]  Radiculopathy of thoracolumbar region [M54.15]  Condition on Discharge: Stable with no complications to procedure   Diet on Discharge: Same as before.  Activity: as per instruction sheet.  Discharge to: Home with a responsible adult.  Follow up: 2-4 weeks       Please call my office or pager at [phone] if experienced any weakness or loss of sensation, fever > 101.5, pain uncontrolled with oral medications, persistent nausea/vomiting/or diarrhea, redness or drainage from the incisions, or any other worrisome concerns. If physician on call was not reached or could not communicate with our office for any reason please go to the nearest emergency department        Holden Pereira

## 2023-12-03 DIAGNOSIS — G89.4 CHRONIC PAIN DISORDER: ICD-10-CM

## 2023-12-03 DIAGNOSIS — Z71.89 COMPLEX CARE COORDINATION: ICD-10-CM

## 2023-12-04 RX ORDER — PREGABALIN 150 MG/1
CAPSULE ORAL
Qty: 90 CAPSULE | Refills: 2 | Status: SHIPPED | OUTPATIENT
Start: 2023-12-13 | End: 2024-03-04 | Stop reason: SDUPTHER

## 2023-12-04 NOTE — PLAN OF CARE
8:45 AM  Cath Lab procedure cancelled, ADA 2000 diet resumed. Telemetry box ordered and requested    10:02     Telemetry applied- audible and visible - per MT deshawn.  NS @ 87  Labs re ordered at STAT- BNP from 0830 and INR- spontaneous oozing of blood.   Lasix 40 mg IVP x1  Remains on 4L/O2/NC-tolerating humidified.       3:43 PM  New IV access established this am  IS.  VSS on 4L and afebrile this shift.  Tolerating pain on PO xz1 PRN.  Good appetite and good UOP this shift, epositioned dependently in bed & c encouragement   Free from injury or skin breakdown; Fall precautions maintained and call light in reach.  POC updated questions answered and comments acknowledged.  Purposeful hourly rounding completed this shift.  Aware of hallucinations    Lasix IVP 20mg x1    Hopeful for procedure in AM-NPO p 0001   Written and verbal DC instructions reviewed w/pt. Pt verbalized understanding. Pt taken off unit in WC.

## 2023-12-12 DIAGNOSIS — G89.4 CHRONIC PAIN DISORDER: ICD-10-CM

## 2023-12-12 DIAGNOSIS — E08.42 DIABETIC POLYNEUROPATHY ASSOCIATED WITH DIABETES MELLITUS DUE TO UNDERLYING CONDITION: ICD-10-CM

## 2023-12-12 DIAGNOSIS — G89.4 CHRONIC PAIN SYNDROME: ICD-10-CM

## 2023-12-13 RX ORDER — HYDROCODONE BITARTRATE AND ACETAMINOPHEN 10; 325 MG/1; MG/1
1 TABLET ORAL EVERY 8 HOURS PRN
Qty: 90 TABLET | Refills: 0 | Status: SHIPPED | OUTPATIENT
Start: 2023-12-13 | End: 2024-01-10 | Stop reason: SDUPTHER

## 2023-12-13 NOTE — TELEPHONE ENCOUNTER
Patient requesting refill on hydrocodone  Last office visit 10-11-23   shows last refill on 11-15-23  Patient does have a pain contract on file with [facility] department  Patient last UDS 06-16-23 was consistent with current therapy     CODEINE   Not Detected        Not Detected      MORPHINE   Not Detected        Not Detected      6-ACETYLMORPHINE   Not Detected        Not Detected      OXYCODONE   Not Detected        Not Detected      NOROYXCODONE   Not Detected        Not Detected      OXYMORPHONE   Not Detected        Not Detected      NOROXYMORPHONE   Not Detected        Not Detected      HYDROCODONE   Present        Present      NORHYDROCODONE   Present        Present      HYDROMORPHONE   Present        Present

## 2023-12-14 ENCOUNTER — LAB VISIT (OUTPATIENT)
Dept: LAB | Facility: OTHER | Age: 78
End: 2023-12-14
Attending: NURSE PRACTITIONER
Payer: MEDICARE

## 2023-12-14 ENCOUNTER — OFFICE VISIT (OUTPATIENT)
Dept: PAIN MEDICINE | Facility: CLINIC | Age: 78
End: 2023-12-14
Payer: MEDICARE

## 2023-12-14 ENCOUNTER — TELEPHONE (OUTPATIENT)
Dept: PAIN MEDICINE | Facility: CLINIC | Age: 78
End: 2023-12-14

## 2023-12-14 VITALS
SYSTOLIC BLOOD PRESSURE: 146 MMHG | DIASTOLIC BLOOD PRESSURE: 75 MMHG | HEART RATE: 75 BPM | RESPIRATION RATE: 18 BRPM | WEIGHT: 195.13 LBS | TEMPERATURE: 98 F | OXYGEN SATURATION: 98 % | BODY MASS INDEX: 36.87 KG/M2

## 2023-12-14 DIAGNOSIS — Z96.89 S/P INSERTION OF SPINAL CORD STIMULATOR: ICD-10-CM

## 2023-12-14 DIAGNOSIS — Z51.81 ENCOUNTER FOR THERAPEUTIC DRUG MONITORING: ICD-10-CM

## 2023-12-14 DIAGNOSIS — E11.65 TYPE 2 DIABETES MELLITUS WITH HYPERGLYCEMIA, WITH LONG-TERM CURRENT USE OF INSULIN: Chronic | ICD-10-CM

## 2023-12-14 DIAGNOSIS — M54.15 RADICULOPATHY OF THORACOLUMBAR REGION: ICD-10-CM

## 2023-12-14 DIAGNOSIS — G89.4 CHRONIC PAIN SYNDROME: Primary | ICD-10-CM

## 2023-12-14 DIAGNOSIS — E11.40 PAINFUL DIABETIC NEUROPATHY: ICD-10-CM

## 2023-12-14 DIAGNOSIS — M81.0 AGE-RELATED OSTEOPOROSIS WITHOUT CURRENT PATHOLOGICAL FRACTURE: ICD-10-CM

## 2023-12-14 DIAGNOSIS — Z79.4 TYPE 2 DIABETES MELLITUS WITH HYPERGLYCEMIA, WITH LONG-TERM CURRENT USE OF INSULIN: Chronic | ICD-10-CM

## 2023-12-14 DIAGNOSIS — Z79.891 ENCOUNTER FOR MONITORING OPIOID MAINTENANCE THERAPY: ICD-10-CM

## 2023-12-14 DIAGNOSIS — D72.829 LEUKOCYTOSIS, UNSPECIFIED TYPE: ICD-10-CM

## 2023-12-14 DIAGNOSIS — Z51.81 ENCOUNTER FOR MONITORING OPIOID MAINTENANCE THERAPY: ICD-10-CM

## 2023-12-14 DIAGNOSIS — E13.42 POLYNEUROPATHY DUE TO SECONDARY DIABETES: ICD-10-CM

## 2023-12-14 LAB
25(OH)D3+25(OH)D2 SERPL-MCNC: 38 NG/ML (ref 30–96)
ERYTHROCYTE [DISTWIDTH] IN BLOOD BY AUTOMATED COUNT: 15.2 % (ref 11.5–14.5)
ESTIMATED AVG GLUCOSE: 252 MG/DL (ref 68–131)
HBA1C MFR BLD: 10.4 % (ref 4–5.6)
HCT VFR BLD AUTO: 34.7 % (ref 37–48.5)
HGB BLD-MCNC: 10.9 G/DL (ref 12–16)
MCH RBC QN AUTO: 27.3 PG (ref 27–31)
MCHC RBC AUTO-ENTMCNC: 31.4 G/DL (ref 32–36)
MCV RBC AUTO: 87 FL (ref 82–98)
PLATELET # BLD AUTO: 311 K/UL (ref 150–450)
PMV BLD AUTO: 11.4 FL (ref 9.2–12.9)
RBC # BLD AUTO: 3.99 M/UL (ref 4–5.4)
WBC # BLD AUTO: 15.08 K/UL (ref 3.9–12.7)

## 2023-12-14 PROCEDURE — 99999 PR PBB SHADOW E&M-EST. PATIENT-LVL V: CPT | Mod: PBBFAC,,, | Performed by: NURSE PRACTITIONER

## 2023-12-14 PROCEDURE — 99214 PR OFFICE/OUTPT VISIT, EST, LEVL IV, 30-39 MIN: ICD-10-PCS | Mod: S$PBB,,, | Performed by: NURSE PRACTITIONER

## 2023-12-14 PROCEDURE — 83036 HEMOGLOBIN GLYCOSYLATED A1C: CPT | Performed by: NURSE PRACTITIONER

## 2023-12-14 PROCEDURE — 36415 COLL VENOUS BLD VENIPUNCTURE: CPT | Performed by: NURSE PRACTITIONER

## 2023-12-14 PROCEDURE — 82306 VITAMIN D 25 HYDROXY: CPT | Performed by: NURSE PRACTITIONER

## 2023-12-14 PROCEDURE — 99215 OFFICE O/P EST HI 40 MIN: CPT | Mod: PBBFAC | Performed by: NURSE PRACTITIONER

## 2023-12-14 PROCEDURE — 80307 DRUG TEST PRSMV CHEM ANLYZR: CPT | Performed by: NURSE PRACTITIONER

## 2023-12-14 PROCEDURE — 80326 AMPHETAMINES 5 OR MORE: CPT | Performed by: NURSE PRACTITIONER

## 2023-12-14 PROCEDURE — 99999 PR PBB SHADOW E&M-EST. PATIENT-LVL V: ICD-10-PCS | Mod: PBBFAC,,, | Performed by: NURSE PRACTITIONER

## 2023-12-14 PROCEDURE — 99214 OFFICE O/P EST MOD 30 MIN: CPT | Mod: S$PBB,,, | Performed by: NURSE PRACTITIONER

## 2023-12-14 PROCEDURE — 85027 COMPLETE CBC AUTOMATED: CPT | Performed by: STUDENT IN AN ORGANIZED HEALTH CARE EDUCATION/TRAINING PROGRAM

## 2023-12-14 NOTE — PROGRESS NOTES
Chronic patient Established Note (Follow up visit)        Interval History 12/14/2023:  The patient returns to clinic today for follow up of back and leg pain. She continues to report low back pain that radiates into both legs. She continues to report bilateral neuropathic pain into the feet. She states that today is a bad day. She is having more left foot and toe pain. Her pain is worse with prolonged standing and walking. She does have benefit with Qutenza patches. She is taking Lyrica, Robaxin, and Norco with benefit and without adverse effects. She denies other health changes. Her pain today is 8/10.    Interval History 10/11/2023:  The patient returns to clinic today for follow up of bilateral foot pain. She continues to report neuropathic pain to her feet. Her pain is worse with standing and walking. She does have benefit with Qutenza patches. She also takes Lyrica, Robaxin, and Norco. She denies any adverse effects. She denies any other health changes. Her pain today is 8/10.    Interval History 9/15/2023:  The patient returns to clinic today for follow up of back and leg pain. She did have benefit with Qutenza patches. She continues to report low back pain with intermittent radicular leg pain. She continues to report neuropathic pain into her feet. She continues to take Lyrica and Robaxin with benefit. She also takes Norco as needed with benefit. She denies any adverse effects. She denies any other health changes. Her pain today is 9/10.    Interval History 6/27/2023:  The patient returns to clinic today for follow up of pain. She continues to report nerve pain to her feet bilaterally. This pain is worse with standing and walking. She is taking Lyrica and Robaxin. She also takes Norco as needed with benefit and without adverse effects. She denies any other health changes. Her pain today is 9/10.    Interval History 6/16/2023:  The patient returns to clinic today for follow up of back and leg pain. She  continues to report low back pain. She also reports diabetic neuropathy to her bilateral feet. Her pain is worse with standing and walking. She has tried Qutenza patches with benefit in the past. She continues to take Robaxin and Lyrica. She also takes Norco as needed with benefit and without adverse effects. She denies any other health changes. Her pain today is 10/10.    Interval History 3/17/2023:  Mrs Waters presents for follow up of chronic, continuous neuropathic pain to [facility]. She is s/p Nevro SCS trial and unfortunately she did not get the relief she was hoping for and 50% at best relief but this was not consistent. She has no constitutional s/s concerning for infection and denies newer neurological changes since trial. She continues with medication mgt and states Qutenza application has been providing significant benefit.     Interval History 2/10/2023:  Mrs Waters presents for follow up of chronic lower back painn and radicular pain in conjunction with PDN to bilateral feet. She is doing fair with medication mgt of Norco, Robaxin, and Lyrica and does need refill at this time. She denies SE of medications. She is also here for Qutenza application today. She is scheduled to have upcoming SCS trial with [facility] to aslo address her PDN.     Interval History 12/13/2022:  Mrs Waters presents for follow up of chronic pain. She is ready to repeat Qutenza application as it has been >91 days and she had significant improvement of symptoms of PDN. She recently had repeat A1C and just above 10.0 but is decreasing. She continues to take medication with benefit and denies SE of medication. Medication regimen include Norco 10/325mg TID, Robaxin 500mg and Lyrica 150mg.     Interval History 10/12/2022:  Mrs Waters presents for follow up of chronic neuropathy. She is s/p qutenza patch placement with improved functioning and neuropathy control. Unfortunately her A1C was above 11 which inhibits her from Nevro SCS trial at this time.  She is eager to have SCS trial. She denies new areas of pain or neurological changes. She continued to take  Norco 10/325mg TID, Robaxin 500mg and Lyrica 150mg TID with benefit and denies significant SE of medications.     Interval History 9/8/2022:  Mrs Waters presents for follow up of chronic lower back painn and radicular pain in conjunction with PDN to bilateral feet. She is doing fair with medication mgt of Norco, Robaxin, and Lyrica and does need refill at this time. She denies SE of medications. She is patiently awaiting her A1C to become lower than 10 to proceed with SCS trial.     Interval History 8/12/2022:  Mrs Waters presents for delayed FU. She has had continuous pain to lumbar and radicular pain but also peripheral neuropathy complaints. Over interval she has had CVA but doing fair. Her A1C has unfortunately elevated above 10 at this time and SCS trial placed on hold but phychiatric evaluation complete. She continues to take Norco 10/325mg TID, Robaxin 500mg TID and Lyrica 150mg TID with some benefit and denies SE of medications. No s/s concerning for cauda equina.     Interval History 4/12/2022:  Patient returns to clinic today for follow-up. Her neuropathic pain continues to be an issue for her, but she says that she is able to manage. She is taking Norco 10-325mg TID, Robaxin 500mg TID, and Lyrica 150mg TID without any adverse side effects. She denies any changes in her symptoms. She would like to proceed with SCS trial. Discussed last time that her HbA1c should be <10, was 9.8 on most recent labs. Seen by psychology and cleared for SCS.     Interval History 2/14/2022:  Mrs Waters presents for follow up. Pt states overall neuropathy continues. Since last visit she has been stable with medication mgt and takign Norco 10/325mg TID, Robaxin 500mg TID and Lyrica 150mg TID. She denies new areas of pain or neurological changes. Medication adequate to control pain without adverse SE.      Interval History  12/21/2021:  Pt presents for urgent evaluation s/p fall in kitchen last night. Pt unsure of LOC or head trauma but states some amnesia of events. Pt is having left knee pain, B ankle pain L>R and lower back/buttock pain. Daughter further states tremor like activity last night. During visit daughter asked for bedside commode due to inability to ambulate.  Pt during visit appeared somnolent, pale and began vomiting. Discussed going to ER for further evaluation.      Interval History 12/14/2021:  Mrs Waters presents for follow up of chronic pain complaints. She is hopeful she will have A1C lower soon to move forward with SCS. She is doing fair with medication mgt alone at this time and denies SE of medications. Pt is currently taking Norco 10/325mg TID PRN, Lyrica 150mg TID, and Robaxin 500mg TID. She denies new areas of pain or neurological changes.      Interval History 10/14/2021:  The Pt presents for follow up and s/p B Lumbar sympathetic blocks. Pt states this has done well but ready to proceed with SCS trial. She is aware A1C must be under tight control and Pt and daughter re-iterate knowing this. Pt also mentions DKA admission and diagnosis of vasculitis as well over interval. Pt continues to take hydrocodone 10/325 mg TID PRN, Lyrica 150 mg TID, and Robaxin 500mg TID. She denies any SE of medication, denies new neurological changes.      Interval Hx 09/16/2021  Indira Waters presents to the clinic for a follow-up appointment for f/u after bilateral lumbar sympathetic block on 8/25/21.Patient reports >50% relief after lumbar sympathetic block that lasted 2 weeks when she then developed a UTI/DKA, was admitted to the hospital and pain recurred.  Current pain intensity is 8/10.     Interval History 8/12/2021:  Indira Waters presents to the clinic for a follow-up appointment for BLE diabetic neuropathy, painful. Continues with Norco 10/325mg, Lyrica 150mg TID, Robaxin 750mg daily PRN. She had to cancel  previously scheduled lumbar sympathetic block 2/2 lithotripsy for painful kidney stones, which have now passed. She still has intermittent pain with urination but overall that pain is improving. She had to cancel previous angiogram for this same reason - this has not been rescheduled yet. She denies any change in her LE pain. Denies any new neurological sxs in BLEs.     Interval History 6/10/21:  Indira Waters presents to the clinic for a 2 week follow-up appointment from lumbar sympathetic nerve block in early May. She reports minimal relief from this intervention, but did note that it helped some, briefly. Since the last visit, Indira Waters states the pain has been persistant. Current pain intensity is 10/10. Patient has chronic generalized diffuse pain, most pronounced in her BL lower extremities 2/2 diabetic neuropathy. HSe states that the pain is a little better than at her last visit. Most days are 10/10, but some days are better than others. Her pain is aggravated by exertion, walking, or sitting/standing for extended periods of time. He pain is mildly improved by rest and medications. She is currently taking Lyrica 150 PO TID, Zoloft, and Norco 10-325mg TID PRN. She states that the pain meds are helping but she is still constantly in pain and unable to participate in her ADLs. She has now established care with PCP for assistance w/ poorly controlled T2DM, last A1c 11.4. She has not yet established care w/ Rheumatology for fibromyalgia management.    Interval HPI 4/15/21:  Patient returns for follow up of chronic generalized diffuse pain. At the last visit, had EMG (not yet completed), lumbar and hip x-ray imaging ordered. She was also referred to Rheumatology for fibromyalgia management and referral to PCP for DM2 management and weaning of zoloft for transition to cymbalta for treatment of neuropathy. She had her Lyrica increased to 150mg PO TID, and prescribed Norco 10mg TID with opioid contract signed.  "She has been taking Norco 10mg TID and it has been helping her "bad" pains but does not take all of her pain away. She has not yet been set up with her new PCP or rheumatologist yet for fibromyalgia. Still continues to have generalized pain everywhere including feet, hips, legs, lower and upper back, neck and shoulder pain. Continues to have neuropathy in the bilateral lower extremities due to uncontrolled DM2.    Initial Visit 3/11/21:  Indira Waters presents to the clinic for the evaluation of chronic pain. Complaining of pain everywhere including feet, legs, hips, lower and upper back, neck, shoulder. Pain started 5+ years ago. Pain 10/10 at worse. She was referred here by her PCP Dr. Ramos who she has been seeing for over 6 years. She states her pain is aggravated by any physical activity/movement. She takes lyrica, robaxin, and Norco 10 TID with mild relief of pain. Per patient, she was referred here by her PCP for medication refill. She has not tried physical therapy for several years, she states it did not help last time she was in PT. She says she is trying to exercise daily by doing yoga. She walks with a walker. She has numerous comorbidities including DM2 (Last A1C 9+ per prior family medicine note in Jan 2021), fibromyalgia, lupus, DDD. She states she would like to find a new PCP as she no longer lives near her old one. Patient denies night fever/night sweats, urinary incontinence, bowel incontinence and significant weight loss.      Pain Disability Index Review:      12/14/2023     2:58 PM 10/11/2023     2:34 PM 9/15/2023     2:03 PM   Last 3 PDI Scores   Pain Disability Index (PDI) 56 40 43     Pain Medications:  Norco 10-325mg TID, Robaxin 500mg TID, and Lyrica 150mg TID    Opioid Contract: yes     report:  Reviewed and consistent with medication use as prescribed.    Pain Procedures:    - reports possible back injection 10+ years ago  - Lumbar sympathetic nerve block 05/05/21 - Dr. Pereira - " minimal relief    Physical Therapy/Home Exercise: no     Imaging:   Hip X-ray 4/7/21  FINDINGS:  Osseous structures appear intact without evidence of fracture or osseous destructive process.  No apparent dislocation.     Modest degenerative change or significant joint space narrowing.     Lumbar X-ray 4/7/21  FINDINGS:  Diffuse bony demineralization.  Vertebral bodies are normal in height without evidence of fracture.  No pars defects.     Normal sagittal alignment is preserved.  No spondylolisthesis. No abnormal translation with flexion and extension.     Intervertebral disc heights are well maintained.  Mild facet arthropathy in the lower lumbar spine.     Mild scattered vascular calcification.     Impression:     No evidence of fracture or malalignment.     Mild facet arthropathy in the lower lumbar spine.     Diffuse bony demineralization.  Consider correlation with DEXA.    Allergies:   Review of patient's allergies indicates:   Allergen Reactions    Bleach (sodium hypochlorite) Shortness Of Breath    Nitrofurantoin macrocrystalline Anaphylaxis    Lipitor [atorvastatin] Diarrhea and Rash    Nsaids (non-steroidal anti-inflammatory drug)     Pcn [penicillins]     Toradol [ketorolac]        Current Medications:   Current Outpatient Medications   Medication Sig Dispense Refill    acetaminophen (TYLENOL) 500 MG tablet Take 2 tablets (1,000 mg total) by mouth every 8 (eight) hours as needed for Pain.  0    allopurinoL (ZYLOPRIM) 300 MG tablet Take 1 tablet (300 mg total) by mouth once daily. 90 tablet 1    aspirin (ECOTRIN) 81 MG EC tablet Take 1 tablet (81 mg total) by mouth once daily. 30 tablet 0    blood sugar diagnostic Strp To check BG 6 times daily, to use with insurance preferred meter 200 each 11    blood-glucose meter kit To check BG 6 times daily, to use with insurance preferred meter 1 each 0    blood-glucose meter,continuous (DEXCOM G7 ) Misc Use as directed. 1 each 0    blood-glucose sensor  (DEXCOM G7 SENSOR) Nicole Change every 10 days. 3 each 11    cloNIDine (CATAPRES) 0.1 MG tablet Take 1 tablet (0.1 mg total) by mouth 2 (two) times daily. 180 tablet 0    evolocumab (REPATHA SURECLICK) 140 mg/mL PnIj Inject 1 mL (140 mg total) into the skin every 14 (fourteen) days. 8 each 0    fenofibrate micronized (LOFIBRA) 134 MG Cap Take 1 capsule (134 mg total) by mouth daily with breakfast. 90 capsule 3    furosemide (LASIX) 40 MG tablet TAKE ONE TABLET BY MOUTH EVERY DAY 90 tablet 1    HYDROcodone-acetaminophen (NORCO)  mg per tablet Take 1 tablet by mouth every 8 (eight) hours as needed for Pain. 90 tablet 0    hydrocortisone (ANUSOL-HC) 2.5 % rectal cream Procto-Med HC 2.5 % topical cream perineal applicator      insulin aspart U-100 (NOVOLOG FLEXPEN U-100 INSULIN) 100 unit/mL (3 mL) InPn pen Inject 14 Units into the skin 3 (three) times daily with meals. + meal correction scale. Max 40 units 30 mL 3    insulin detemir U-100, Levemir, (LEVEMIR FLEXTOUCH U100 INSULIN) 100 unit/mL (3 mL) InPn pen Inject 20 Units into the skin once daily AND 18 Units every evening. 30 mL 3    ipratropium-albuteroL (COMBIVENT)  mcg/actuation inhaler Inhale 1 puff into the lungs by mouth every 6 (six) hours. 4 g 0    lancets Misc To check BG 6 times daily, to use with insurance preferred meter 200 each 11    methocarbamoL (ROBAXIN) 500 MG Tab Take 1 tablet (500 mg total) by mouth 3 (three) times daily as needed (muscle spasm). 90 tablet 2    metoclopramide HCl (REGLAN) 5 MG tablet TAKE ONE TABLET BY MOUTH FOUR TIMES DAILY AS NEEDED FOR NAUSEA PREVENTION 30 tablet 3    metoprolol succinate (TOPROL-XL) 100 MG 24 hr tablet Take 1 tablet (100 mg total) by mouth once daily. 90 tablet 3    mupirocin (BACTROBAN) 2 % ointment Apply topically 3 (three) times daily as needed (skin breakdown). 22 g 3    nitroGLYCERIN (NITROSTAT) 0.4 MG SL tablet Place 1 tablet (0.4 mg total) under the tongue every 5 (five) minutes as needed for  "Chest pain. 25 tablet 11    pantoprazole (PROTONIX) 40 MG tablet Take 1 tablet (40 mg total) by mouth once daily. 30 tablet 0    pen needle, diabetic 31 gauge x 1/4" Ndle 1 each by Misc.(Non-Drug; Combo Route) route 5 (five) times daily. 500 each 3    pregabalin (LYRICA) 150 MG capsule TAKE ONE CAPSULE BY MOUTH 3 TIMES DAILY 90 capsule 2    senna-docusate 8.6-50 mg (PERICOLACE) 8.6-50 mg per tablet Take 1 tablet by mouth 2 (two) times daily as needed for Constipation. 60 tablet 0    sertraline (ZOLOFT) 100 MG tablet Take 1 tablet (100 mg total) by mouth once daily. 90 tablet 3    teriparatide 20 mcg/dose (620mcg/2.48mL) PnIj Inject 20 mcg into the skin once daily. 2.48 mL 5    triamcinolone acetonide 0.1% (KENALOG) 0.1 % cream Apply to affected areas of body twice daily as needed rash. Do not use on face, underarms, or groin. 454 g 0    losartan (COZAAR) 50 MG tablet Take 1 tablet (50 mg total) by mouth once daily. 90 tablet 0    NIFEdipine (PROCARDIA-XL) 30 MG (OSM) 24 hr tablet Take 1 tablet (30 mg total) by mouth 2 (two) times a day. 180 tablet 0     No current facility-administered medications for this visit.       REVIEW OF SYSTEMS:    GENERAL:  No weight loss, malaise or fevers.  HEENT:  Negative for frequent or significant headaches.  NECK:  Negative for lumps, goiter, pain and significant neck swelling.  RESPIRATORY:  Negative for cough, wheezing or shortness of breath.  CARDIOVASCULAR:  Negative for chest pain, leg swelling or palpitations. HTN  GI:  Negative for abdominal discomfort, blood in stools or black stools or change in bowel habits.  MUSCULOSKELETAL:  See HPI.  SKIN:  Negative for lesions, rash, and itching.  ENDO: Diabetes  PSYCH:  Negative for sleep disturbance, mood disorder and recent psychosocial stressors.  HEMATOLOGY/LYMPHOLOGY:  Negative for prolonged bleeding, bruising easily or swollen nodes.  NEURO:   No history of headaches, syncope, paralysis, seizures or tremors.  All other reviewed " and negative other than HPI.    Past Medical History:  Past Medical History:   Diagnosis Date    Arthritis     Back pain     Cancer     ovarian    Cervical cancer     Depression     Diabetes mellitus     Fibromyalgia     Heart attack     Hyperlipidemia     Hypertension     Lupus     Stroke     slight left sided weakness       Past Surgical History:  Past Surgical History:   Procedure Laterality Date    APPENDECTOMY       SECTION      2    CORONARY ANGIOGRAPHY N/A 2022    Procedure: ANGIOGRAM, CORONARY ARTERY;  Surgeon: Jose L Méndez MD;  Location: [facility] CATH LAB;  Service: Cardiology;  Laterality: N/A;    HYSTERECTOMY      with USO for cervical cancer    INJECTION OF ANESTHETIC AGENT AROUND NERVE Bilateral 2021    Procedure: BLOCK, NERVE, SYMPATHIC;  Surgeon: Holden Pereira MD;  Location: [facility] PAIN MGT;  Service: Pain Management;  Laterality: Bilateral;    INJECTION OF ANESTHETIC AGENT AROUND NERVE N/A 2021    Procedure: BLOCK, NERVE, SYMPATHETIC  need consent;  Surgeon: Holden Pereira MD;  Location: [facility] PAIN MGT;  Service: Pain Management;  Laterality: N/A;    ID EVAL,SWALLOW FUNCTION,CINE/VIDEO RECORD  2021         TONSILLECTOMY      TRIAL OF SPINAL CORD NERVE STIMULATOR N/A 3/8/2023    Procedure: LUMBAR SPINAL CORD STIMULATOR TRIAL NEVRO REP PATIENT STATES SHE NO LONGER TAKES PLAVIX;  Surgeon: Holden Pereira MD;  Location: [facility] PAIN MGT;  Service: Pain Management;  Laterality: N/A;       Family History:  Family History   Problem Relation Age of Onset    COPD Mother     Lupus Mother     Hernia Mother     Uterine cancer Mother         vs cervical cancer    Ovarian cancer Mother     Diabetes Father     Coronary artery disease Father     Colon cancer Maternal Grandmother         in her 50's       Social History:  Social History     Socioeconomic History    Marital status:    Tobacco Use    Smoking status: Former     Current packs/day: 0.00     Types: Cigarettes      Quit date: 11/2020     Years since quitting: 3.1     Passive exposure: Past    Smokeless tobacco: Never   Substance and Sexual Activity    Alcohol use: Not Currently     Comment: occasionally    Drug use: Yes     Types: Hydrocodone     Comment: three times a day    Sexual activity: Not Currently   Social History Narrative    Lives with daughter. .      Social Determinants of Health     Financial Resource Strain: Low Risk  (5/5/2022)    Overall Financial Resource Strain (CARDIA)     Difficulty of Paying Living Expenses: Not hard at all   Food Insecurity: No Food Insecurity (5/5/2022)    Hunger Vital Sign     Worried About Running Out of Food in the Last Year: Never true     Ran Out of Food in the Last Year: Never true   Transportation Needs: No Transportation Needs (5/5/2022)    PRAPARE - Transportation     Lack of Transportation (Medical): No     Lack of Transportation (Non-Medical): No   Physical Activity: Insufficiently Active (5/5/2022)    Exercise Vital Sign     Days of Exercise per Week: 3 days     Minutes of Exercise per Session: 20 min   Stress: No Stress Concern Present (5/5/2022)    [employer] - Occupational Stress Questionnaire     Feeling of Stress : Not at all   Social Connections: Socially Isolated (5/5/2022)    Social Connection and Isolation Panel [NHANES]     Frequency of Communication with Friends and Family: More than three times a week     Frequency of Social Gatherings with Friends and Family: Never     Attends Judaism Services: Never     Active Member of Clubs or Organizations: No     Attends Club or Organization Meetings: Never     Marital Status:    Housing Stability: Low Risk  (5/5/2022)    Housing Stability Vital Sign     Unable to Pay for Housing in the Last Year: No     Number of Places Lived in the Last Year: 1     Unstable Housing in the Last Year: No       OBJECTIVE:    BP (!) 146/75   Pulse 75   Temp 98.3 °F (36.8 °C)   Resp 18   Wt  88.5 kg (195 lb 1.7 oz)   SpO2 98%   BMI 36.87 kg/m²     PHYSICAL EXAMINATION:     General appearance: Well appearing, in no acute distress, alert and oriented x3.  Psych:  Mood and affect appropriate.  Skin: Skin color, texture, turgor normal, no rashes or lesions, in both upper and lower body.  Head/face:  Atraumatic, normocephalic.  Pulm: Symmetric chest rise, no respiratory distress noted.   Back: Straight leg raise in the sitting position is negative for radicular pain.   Musculoskeletal: 5/5 strength in right ankle with plantar and dorsiflexion. 5/5 strength in left ankle with plantar and dorsiflexion. 4/5 strength with right knee flexion and extension. 4/5 strength with left knee flexion and extension.   Neuro:  Decreased sensation to light touch to BLE.   Gait: Antalgic- ambulates with wheelchair.     ASSESSMENT: 78 y.o. year old female with chronic pain, consistent with:     1. Chronic pain syndrome        2. Painful diabetic neuropathy        3. Polyneuropathy due to secondary diabetes        4. Radiculopathy of thoracolumbar region        5. S/P insertion of spinal cord stimulator        6. Encounter for therapeutic drug monitoring        7. Encounter for monitoring opioid maintenance therapy  Pain Clinic Drug Screen          PLAN:     - Previous imaging reviewed. Labs reviewed.     - Schedule for Qutenza patches after January 12.    - Continue Robaxin 500 mg TID PRN.    - Continue Lyrica 150 mg TID.     - Continue Norco 10/325 mg TID PRN, #90, refill provided.      - The patient is here today for a refill of current pain medications and they believe these provide effective pain control and improvements in quality of life.  The patient notes no serious side effects, and feels the benefits outweigh the risks.  The patient was reminded of the pain contract that they signed previously as well as the risks and benefits of the medication including possible death.  The updated [place] [employer]  prescription monitoring program was reviewed, and the patient has been found to be compliant with current treatment plan. Medication management provided by  in absence of Dr. Pereira.     - UDS from 6/16/2023 reviewed and consistent. Repeat UDS today.     - RTC in 3 months.     The above plan and management options were discussed at length with patient. Patient is in agreement with the above and verbalized understanding.      Elvira Hylton NP  12/14/2023

## 2023-12-15 ENCOUNTER — HOSPITAL ENCOUNTER (OUTPATIENT)
Facility: OTHER | Age: 78
Discharge: HOME | End: 2023-12-16
Attending: EMERGENCY MEDICINE | Admitting: INTERNAL MEDICINE
Payer: MEDICARE

## 2023-12-15 ENCOUNTER — TELEPHONE (OUTPATIENT)
Dept: INTERNAL MEDICINE | Facility: CLINIC | Age: 78
End: 2023-12-15
Payer: MEDICARE

## 2023-12-15 DIAGNOSIS — R07.9 CHEST PAIN WITH HIGH RISK FOR CARDIAC ETIOLOGY: ICD-10-CM

## 2023-12-15 DIAGNOSIS — R07.9 CHEST PAIN: ICD-10-CM

## 2023-12-15 DIAGNOSIS — I25.119 CHEST PAIN DUE TO CAD: ICD-10-CM

## 2023-12-15 PROBLEM — I25.10 ATHSCL HEART DISEASE OF NATIVE CORONARY ARTERY W/O ANG PCTRS: Status: ACTIVE | Noted: 2021-09-15

## 2023-12-15 PROBLEM — F32.A DEPRESSION, UNSPECIFIED: Status: ACTIVE | Noted: 2021-11-10

## 2023-12-15 PROBLEM — M32.9 SYSTEMIC LUPUS ERYTHEMATOSUS, UNSPECIFIED: Status: ACTIVE | Noted: 2021-09-15

## 2023-12-15 PROBLEM — I50.32 CHRONIC DIASTOLIC (CONGESTIVE) HEART FAILURE: Status: ACTIVE | Noted: 2021-09-15

## 2023-12-15 PROBLEM — E11.22 TYPE 2 DIABETES MELLITUS WITH DIABETIC CHRONIC KIDNEY DISEASE: Status: ACTIVE | Noted: 2021-09-15

## 2023-12-15 LAB
ALBUMIN SERPL BCP-MCNC: 3.6 G/DL (ref 3.5–5.2)
ALP SERPL-CCNC: 65 U/L (ref 55–135)
ALT SERPL W/O P-5'-P-CCNC: 15 U/L (ref 10–44)
ANION GAP SERPL CALC-SCNC: 15 MMOL/L (ref 8–16)
AST SERPL-CCNC: 28 U/L (ref 10–40)
BASOPHILS # BLD AUTO: 0.08 K/UL (ref 0–0.2)
BASOPHILS NFR BLD: 0.7 % (ref 0–1.9)
BILIRUB SERPL-MCNC: 0.2 MG/DL (ref 0.1–1)
BUN SERPL-MCNC: 19 MG/DL (ref 8–23)
CALCIUM SERPL-MCNC: 9.2 MG/DL (ref 8.7–10.5)
CHLORIDE SERPL-SCNC: 99 MMOL/L (ref 95–110)
CO2 SERPL-SCNC: 22 MMOL/L (ref 23–29)
CREAT SERPL-MCNC: 1.2 MG/DL (ref 0.5–1.4)
CRP SERPL-MCNC: 17.6 MG/L (ref 0–8.2)
CTP QC/QA: YES
CTP QC/QA: YES
DIFFERENTIAL METHOD: ABNORMAL
EOSINOPHIL # BLD AUTO: 0.8 K/UL (ref 0–0.5)
EOSINOPHIL NFR BLD: 6.6 % (ref 0–8)
ERYTHROCYTE [DISTWIDTH] IN BLOOD BY AUTOMATED COUNT: 15.6 % (ref 11.5–14.5)
EST. GFR  (NO RACE VARIABLE): 47 ML/MIN/1.73 M^2
GLUCOSE SERPL-MCNC: 193 MG/DL (ref 70–110)
GLUCOSE SERPL-MCNC: 210 MG/DL (ref 70–110)
HCT VFR BLD AUTO: 34.3 % (ref 37–48.5)
HCV AB SERPL QL IA: NEGATIVE
HGB BLD-MCNC: 11.1 G/DL (ref 12–16)
HIV 1+2 AB+HIV1 P24 AG SERPL QL IA: NEGATIVE
IMM GRANULOCYTES # BLD AUTO: 0.11 K/UL (ref 0–0.04)
IMM GRANULOCYTES NFR BLD AUTO: 0.9 % (ref 0–0.5)
LYMPHOCYTES # BLD AUTO: 3.1 K/UL (ref 1–4.8)
LYMPHOCYTES NFR BLD: 26 % (ref 18–48)
MCH RBC QN AUTO: 27.7 PG (ref 27–31)
MCHC RBC AUTO-ENTMCNC: 32.4 G/DL (ref 32–36)
MCV RBC AUTO: 86 FL (ref 82–98)
MONOCYTES # BLD AUTO: 1 K/UL (ref 0.3–1)
MONOCYTES NFR BLD: 8.5 % (ref 4–15)
NEUTROPHILS # BLD AUTO: 6.8 K/UL (ref 1.8–7.7)
NEUTROPHILS NFR BLD: 57.3 % (ref 38–73)
NRBC BLD-RTO: 0 /100 WBC
PLATELET # BLD AUTO: 306 K/UL (ref 150–450)
PMV BLD AUTO: 11.2 FL (ref 9.2–12.9)
POC MOLECULAR INFLUENZA A AGN: NEGATIVE
POC MOLECULAR INFLUENZA B AGN: NEGATIVE
POCT GLUCOSE: 210 MG/DL (ref 70–110)
POTASSIUM SERPL-SCNC: 4.7 MMOL/L (ref 3.5–5.1)
PROT SERPL-MCNC: 7.5 G/DL (ref 6–8.4)
RBC # BLD AUTO: 4.01 M/UL (ref 4–5.4)
SARS-COV-2 RDRP RESP QL NAA+PROBE: NEGATIVE
SODIUM SERPL-SCNC: 136 MMOL/L (ref 136–145)
TROPONIN I SERPL DL<=0.01 NG/ML-MCNC: 0.02 NG/ML (ref 0–0.03)
WBC # BLD AUTO: 11.82 K/UL (ref 3.9–12.7)

## 2023-12-15 PROCEDURE — 93005 ELECTROCARDIOGRAM TRACING: CPT

## 2023-12-15 PROCEDURE — 86803 HEPATITIS C AB TEST: CPT | Performed by: EMERGENCY MEDICINE

## 2023-12-15 PROCEDURE — G0378 HOSPITAL OBSERVATION PER HR: HCPCS

## 2023-12-15 PROCEDURE — 86160 COMPLEMENT ANTIGEN: CPT | Performed by: EMERGENCY MEDICINE

## 2023-12-15 PROCEDURE — 25000003 PHARM REV CODE 250: Performed by: NURSE PRACTITIONER

## 2023-12-15 PROCEDURE — 84484 ASSAY OF TROPONIN QUANT: CPT | Performed by: NURSE PRACTITIONER

## 2023-12-15 PROCEDURE — 86140 C-REACTIVE PROTEIN: CPT | Performed by: EMERGENCY MEDICINE

## 2023-12-15 PROCEDURE — 85025 COMPLETE CBC W/AUTO DIFF WBC: CPT | Performed by: NURSE PRACTITIONER

## 2023-12-15 PROCEDURE — 82962 GLUCOSE BLOOD TEST: CPT

## 2023-12-15 PROCEDURE — 86160 COMPLEMENT ANTIGEN: CPT | Mod: 59 | Performed by: EMERGENCY MEDICINE

## 2023-12-15 PROCEDURE — 87389 HIV-1 AG W/HIV-1&-2 AB AG IA: CPT | Performed by: EMERGENCY MEDICINE

## 2023-12-15 PROCEDURE — 99285 EMERGENCY DEPT VISIT HI MDM: CPT | Mod: 25

## 2023-12-15 PROCEDURE — 87635 SARS-COV-2 COVID-19 AMP PRB: CPT | Performed by: EMERGENCY MEDICINE

## 2023-12-15 PROCEDURE — 25000003 PHARM REV CODE 250: Performed by: EMERGENCY MEDICINE

## 2023-12-15 PROCEDURE — 96372 THER/PROPH/DIAG INJ SC/IM: CPT | Performed by: NURSE PRACTITIONER

## 2023-12-15 PROCEDURE — 25000242 PHARM REV CODE 250 ALT 637 W/ HCPCS: Performed by: EMERGENCY MEDICINE

## 2023-12-15 PROCEDURE — 93010 ELECTROCARDIOGRAM REPORT: CPT | Mod: 76,,, | Performed by: INTERNAL MEDICINE

## 2023-12-15 PROCEDURE — 80053 COMPREHEN METABOLIC PANEL: CPT | Performed by: NURSE PRACTITIONER

## 2023-12-15 PROCEDURE — 63600175 PHARM REV CODE 636 W HCPCS: Mod: UD | Performed by: NURSE PRACTITIONER

## 2023-12-15 PROCEDURE — 93010 ELECTROCARDIOGRAM REPORT: CPT | Mod: ,,, | Performed by: INTERNAL MEDICINE

## 2023-12-15 PROCEDURE — 93010 EKG 12-LEAD: ICD-10-PCS | Mod: ,,, | Performed by: INTERNAL MEDICINE

## 2023-12-15 RX ORDER — ONDANSETRON 2 MG/ML
4 INJECTION INTRAMUSCULAR; INTRAVENOUS EVERY 8 HOURS PRN
Status: DISCONTINUED | OUTPATIENT
Start: 2023-12-15 | End: 2023-12-16 | Stop reason: HOSPADM

## 2023-12-15 RX ORDER — ONDANSETRON 2 MG/ML
4 INJECTION INTRAMUSCULAR; INTRAVENOUS EVERY 6 HOURS PRN
Status: DISCONTINUED | OUTPATIENT
Start: 2023-12-15 | End: 2023-12-16 | Stop reason: HOSPADM

## 2023-12-15 RX ORDER — GLUCAGON 1 MG
1 KIT INJECTION
Status: DISCONTINUED | OUTPATIENT
Start: 2023-12-15 | End: 2023-12-16 | Stop reason: HOSPADM

## 2023-12-15 RX ORDER — NITROGLYCERIN 0.4 MG/1
0.4 TABLET SUBLINGUAL EVERY 5 MIN PRN
Status: DISCONTINUED | OUTPATIENT
Start: 2023-12-15 | End: 2023-12-15

## 2023-12-15 RX ORDER — PANTOPRAZOLE SODIUM 40 MG/1
40 TABLET, DELAYED RELEASE ORAL DAILY
Status: DISCONTINUED | OUTPATIENT
Start: 2023-12-16 | End: 2023-12-16 | Stop reason: HOSPADM

## 2023-12-15 RX ORDER — PREGABALIN 75 MG/1
150 CAPSULE ORAL 3 TIMES DAILY
Status: DISCONTINUED | OUTPATIENT
Start: 2023-12-15 | End: 2023-12-16 | Stop reason: HOSPADM

## 2023-12-15 RX ORDER — METOPROLOL SUCCINATE 50 MG/1
100 TABLET, EXTENDED RELEASE ORAL DAILY
Status: DISCONTINUED | OUTPATIENT
Start: 2023-12-16 | End: 2023-12-16 | Stop reason: HOSPADM

## 2023-12-15 RX ORDER — AMOXICILLIN 250 MG
1 CAPSULE ORAL 2 TIMES DAILY
Status: DISCONTINUED | OUTPATIENT
Start: 2023-12-15 | End: 2023-12-16 | Stop reason: HOSPADM

## 2023-12-15 RX ORDER — SODIUM CHLORIDE 0.9 % (FLUSH) 0.9 %
10 SYRINGE (ML) INJECTION
Status: DISCONTINUED | OUTPATIENT
Start: 2023-12-15 | End: 2023-12-16 | Stop reason: HOSPADM

## 2023-12-15 RX ORDER — IBUPROFEN 200 MG
24 TABLET ORAL
Status: DISCONTINUED | OUTPATIENT
Start: 2023-12-15 | End: 2023-12-16 | Stop reason: HOSPADM

## 2023-12-15 RX ORDER — ENOXAPARIN SODIUM 100 MG/ML
40 INJECTION SUBCUTANEOUS EVERY 24 HOURS
Status: DISCONTINUED | OUTPATIENT
Start: 2023-12-15 | End: 2023-12-16 | Stop reason: HOSPADM

## 2023-12-15 RX ORDER — METHOCARBAMOL 500 MG/1
500 TABLET, FILM COATED ORAL 3 TIMES DAILY PRN
Status: DISCONTINUED | OUTPATIENT
Start: 2023-12-15 | End: 2023-12-16 | Stop reason: HOSPADM

## 2023-12-15 RX ORDER — CLONIDINE HYDROCHLORIDE 0.1 MG/1
0.1 TABLET ORAL 2 TIMES DAILY
Status: DISCONTINUED | OUTPATIENT
Start: 2023-12-15 | End: 2023-12-16 | Stop reason: HOSPADM

## 2023-12-15 RX ORDER — ACETAMINOPHEN 325 MG/1
650 TABLET ORAL EVERY 8 HOURS PRN
Status: DISCONTINUED | OUTPATIENT
Start: 2023-12-15 | End: 2023-12-16 | Stop reason: HOSPADM

## 2023-12-15 RX ORDER — NITROGLYCERIN 0.4 MG/1
TABLET SUBLINGUAL
Status: DISPENSED
Start: 2023-12-15 | End: 2023-12-16

## 2023-12-15 RX ORDER — HYDROCODONE BITARTRATE AND ACETAMINOPHEN 10; 325 MG/1; MG/1
1 TABLET ORAL EVERY 8 HOURS PRN
Status: DISCONTINUED | OUTPATIENT
Start: 2023-12-15 | End: 2023-12-16 | Stop reason: HOSPADM

## 2023-12-15 RX ORDER — INSULIN ASPART 100 [IU]/ML
0-10 INJECTION, SOLUTION INTRAVENOUS; SUBCUTANEOUS
Status: DISCONTINUED | OUTPATIENT
Start: 2023-12-15 | End: 2023-12-16 | Stop reason: HOSPADM

## 2023-12-15 RX ORDER — TALC
6 POWDER (GRAM) TOPICAL NIGHTLY PRN
Status: DISCONTINUED | OUTPATIENT
Start: 2023-12-15 | End: 2023-12-16 | Stop reason: HOSPADM

## 2023-12-15 RX ORDER — MAG HYDROX/ALUMINUM HYD/SIMETH 200-200-20
30 SUSPENSION, ORAL (FINAL DOSE FORM) ORAL 4 TIMES DAILY PRN
Status: DISCONTINUED | OUTPATIENT
Start: 2023-12-15 | End: 2023-12-16 | Stop reason: HOSPADM

## 2023-12-15 RX ORDER — FUROSEMIDE 20 MG/1
40 TABLET ORAL DAILY
Status: DISCONTINUED | OUTPATIENT
Start: 2023-12-16 | End: 2023-12-16 | Stop reason: HOSPADM

## 2023-12-15 RX ORDER — ASPIRIN 81 MG/1
81 TABLET ORAL DAILY
Status: DISCONTINUED | OUTPATIENT
Start: 2023-12-16 | End: 2023-12-16 | Stop reason: HOSPADM

## 2023-12-15 RX ORDER — NIFEDIPINE 30 MG/1
30 TABLET, EXTENDED RELEASE ORAL 2 TIMES DAILY
Status: DISCONTINUED | OUTPATIENT
Start: 2023-12-15 | End: 2023-12-16 | Stop reason: HOSPADM

## 2023-12-15 RX ORDER — IBUPROFEN 200 MG
16 TABLET ORAL
Status: DISCONTINUED | OUTPATIENT
Start: 2023-12-15 | End: 2023-12-16 | Stop reason: HOSPADM

## 2023-12-15 RX ORDER — SERTRALINE HYDROCHLORIDE 100 MG/1
100 TABLET, FILM COATED ORAL DAILY
Status: DISCONTINUED | OUTPATIENT
Start: 2023-12-16 | End: 2023-12-16 | Stop reason: HOSPADM

## 2023-12-15 RX ORDER — NITROGLYCERIN 0.4 MG/1
0.4 TABLET SUBLINGUAL EVERY 5 MIN PRN
Status: COMPLETED | OUTPATIENT
Start: 2023-12-15 | End: 2023-12-15

## 2023-12-15 RX ORDER — ASPIRIN 325 MG
325 TABLET ORAL
Status: COMPLETED | OUTPATIENT
Start: 2023-12-15 | End: 2023-12-15

## 2023-12-15 RX ORDER — IPRATROPIUM BROMIDE AND ALBUTEROL SULFATE 2.5; .5 MG/3ML; MG/3ML
3 SOLUTION RESPIRATORY (INHALATION) EVERY 6 HOURS PRN
Status: DISCONTINUED | OUTPATIENT
Start: 2023-12-15 | End: 2023-12-16 | Stop reason: HOSPADM

## 2023-12-15 RX ORDER — SIMETHICONE 80 MG
1 TABLET,CHEWABLE ORAL 4 TIMES DAILY PRN
Status: DISCONTINUED | OUTPATIENT
Start: 2023-12-15 | End: 2023-12-16 | Stop reason: HOSPADM

## 2023-12-15 RX ORDER — SODIUM CHLORIDE 0.9 % (FLUSH) 0.9 %
10 SYRINGE (ML) INJECTION EVERY 8 HOURS PRN
Status: DISCONTINUED | OUTPATIENT
Start: 2023-12-15 | End: 2023-12-16

## 2023-12-15 RX ORDER — NALOXONE HCL 0.4 MG/ML
0.02 VIAL (ML) INJECTION
Status: DISCONTINUED | OUTPATIENT
Start: 2023-12-15 | End: 2023-12-16 | Stop reason: HOSPADM

## 2023-12-15 RX ORDER — ALLOPURINOL 300 MG/1
300 TABLET ORAL DAILY
Status: DISCONTINUED | OUTPATIENT
Start: 2023-12-16 | End: 2023-12-16 | Stop reason: HOSPADM

## 2023-12-15 RX ADMIN — NITROGLYCERIN 0.4 MG: 0.4 TABLET, ORALLY DISINTEGRATING SUBLINGUAL at 07:12

## 2023-12-15 RX ADMIN — ENOXAPARIN SODIUM 40 MG: 40 INJECTION SUBCUTANEOUS at 11:12

## 2023-12-15 RX ADMIN — NITROGLYCERIN 1 INCH: 20 OINTMENT TOPICAL at 08:12

## 2023-12-15 RX ADMIN — ASPIRIN 325 MG ORAL TABLET 325 MG: 325 PILL ORAL at 09:12

## 2023-12-15 RX ADMIN — NIFEDIPINE 30 MG: 30 TABLET, FILM COATED, EXTENDED RELEASE ORAL at 11:12

## 2023-12-15 RX ADMIN — CLONIDINE HYDROCHLORIDE 0.1 MG: 0.1 TABLET ORAL at 11:12

## 2023-12-15 RX ADMIN — PREGABALIN 150 MG: 75 CAPSULE ORAL at 11:12

## 2023-12-15 NOTE — TELEPHONE ENCOUNTER
Patient is a 78 y.o. female with chronic medical problems including DM, HTN, HLD, CAD, fibromyalgia, osteoporosis      Called pt to discuss white blood cell count and determine if she is taking any steroids currently (no), daughter states she has been feeling poorly for the past week.    +SOB  +Chest pain, nitro x 2 last  +Anal bleeding with pain more problematic than usual  +Fatigue, malaise  No confusion  No abdom pain    Considered outpatient work up with labs, CXR, urine etc, but vulnerable patient going in to the weekend.  [facility] aware    jl

## 2023-12-16 ENCOUNTER — HOSPITAL ENCOUNTER (OUTPATIENT)
Dept: CARDIOLOGY | Facility: OTHER | Age: 78
Discharge: HOME | End: 2023-12-16
Attending: EMERGENCY MEDICINE | Admitting: INTERNAL MEDICINE
Payer: MEDICARE

## 2023-12-16 VITALS
BODY MASS INDEX: 37 KG/M2 | OXYGEN SATURATION: 94 % | WEIGHT: 196 LBS | HEIGHT: 61 IN | SYSTOLIC BLOOD PRESSURE: 140 MMHG | RESPIRATION RATE: 20 BRPM | HEART RATE: 76 BPM | TEMPERATURE: 98 F | DIASTOLIC BLOOD PRESSURE: 65 MMHG

## 2023-12-16 VITALS
HEART RATE: 81 BPM | WEIGHT: 195 LBS | HEIGHT: 61 IN | SYSTOLIC BLOOD PRESSURE: 145 MMHG | DIASTOLIC BLOOD PRESSURE: 61 MMHG | BODY MASS INDEX: 36.82 KG/M2

## 2023-12-16 LAB
ALBUMIN SERPL BCP-MCNC: 3.2 G/DL (ref 3.5–5.2)
ALP SERPL-CCNC: 60 U/L (ref 55–135)
ALT SERPL W/O P-5'-P-CCNC: 16 U/L (ref 10–44)
ANION GAP SERPL CALC-SCNC: 11 MMOL/L (ref 8–16)
ASCENDING AORTA: 3.39 CM
AST SERPL-CCNC: 18 U/L (ref 10–40)
AV INDEX (PROSTH): 0.81
AV MEAN GRADIENT: 8 MMHG
AV PEAK GRADIENT: 13 MMHG
AV VALVE AREA BY VELOCITY RATIO: 2.49 CM²
AV VALVE AREA: 2.69 CM²
AV VELOCITY RATIO: 0.75
BASOPHILS # BLD AUTO: 0.07 K/UL (ref 0–0.2)
BASOPHILS NFR BLD: 0.7 % (ref 0–1.9)
BILIRUB SERPL-MCNC: 0.2 MG/DL (ref 0.1–1)
BSA FOR ECHO PROCEDURE: 1.95 M2
BUN SERPL-MCNC: 20 MG/DL (ref 8–23)
C3 SERPL-MCNC: 179 MG/DL (ref 50–180)
C4 SERPL-MCNC: 41 MG/DL (ref 11–44)
CALCIUM SERPL-MCNC: 8.7 MG/DL (ref 8.7–10.5)
CHLORIDE SERPL-SCNC: 99 MMOL/L (ref 95–110)
CO2 SERPL-SCNC: 23 MMOL/L (ref 23–29)
CREAT SERPL-MCNC: 1.5 MG/DL (ref 0.5–1.4)
CV ECHO LV RWT: 0.69 CM
DIFFERENTIAL METHOD: ABNORMAL
DOP CALC AO PEAK VEL: 1.82 M/S
DOP CALC AO VTI: 37.6 CM
DOP CALC LVOT AREA: 3.3 CM2
DOP CALC LVOT DIAMETER: 2.06 CM
DOP CALC LVOT PEAK VEL: 1.36 M/S
DOP CALC LVOT STROKE VOLUME: 101.27 CM3
DOP CALC RVOT PEAK VEL: 1.2 M/S
DOP CALC RVOT VTI: 25 CM
DOP CALCLVOT PEAK VEL VTI: 30.4 CM
E WAVE DECELERATION TIME: 163.2 MSEC
E/A RATIO: 0.86
E/E' RATIO: 17.83 M/S
ECHO LV POSTERIOR WALL: 1.2 CM (ref 0.6–1.1)
EOSINOPHIL # BLD AUTO: 0.7 K/UL (ref 0–0.5)
EOSINOPHIL NFR BLD: 6.3 % (ref 0–8)
ERYTHROCYTE [DISTWIDTH] IN BLOOD BY AUTOMATED COUNT: 15.5 % (ref 11.5–14.5)
EST. GFR  (NO RACE VARIABLE): 36 ML/MIN/1.73 M^2
FRACTIONAL SHORTENING: 25 % (ref 28–44)
GLUCOSE SERPL-MCNC: 502 MG/DL (ref 70–110)
HCT VFR BLD AUTO: 32.8 % (ref 37–48.5)
HGB BLD-MCNC: 10.4 G/DL (ref 12–16)
HR MV ECHO: 79 BPM
IMM GRANULOCYTES # BLD AUTO: 0.08 K/UL (ref 0–0.04)
IMM GRANULOCYTES NFR BLD AUTO: 0.7 % (ref 0–0.5)
INTERVENTRICULAR SEPTUM: 1.04 CM (ref 0.6–1.1)
IVRT: 87.54 MSEC
LA MAJOR: 5.68 CM
LA MINOR: 4.56 CM
LA WIDTH: 3.4 CM
LEFT ATRIUM SIZE: 3.45 CM
LEFT ATRIUM VOLUME INDEX MOD: 36.9 ML/M2
LEFT ATRIUM VOLUME INDEX: 27 ML/M2
LEFT ATRIUM VOLUME MOD: 69 CM3
LEFT ATRIUM VOLUME: 50.44 CM3
LEFT INTERNAL DIMENSION IN SYSTOLE: 2.64 CM (ref 2.1–4)
LEFT VENTRICLE DIASTOLIC VOLUME INDEX: 27.24 ML/M2
LEFT VENTRICLE DIASTOLIC VOLUME: 50.93 ML
LEFT VENTRICLE MASS INDEX: 65 G/M2
LEFT VENTRICLE SYSTOLIC VOLUME INDEX: 13.7 ML/M2
LEFT VENTRICLE SYSTOLIC VOLUME: 25.65 ML
LEFT VENTRICULAR INTERNAL DIMENSION IN DIASTOLE: 3.5 CM (ref 3.5–6)
LEFT VENTRICULAR MASS: 122.28 G
LV LATERAL E/E' RATIO: 15.29 M/S
LV SEPTAL E/E' RATIO: 21.4 M/S
LVOT MG: 3.95 MMHG
LVOT MV: 0.92 CM/S
LYMPHOCYTES # BLD AUTO: 3.3 K/UL (ref 1–4.8)
LYMPHOCYTES NFR BLD: 30.2 % (ref 18–48)
MCH RBC QN AUTO: 27.6 PG (ref 27–31)
MCHC RBC AUTO-ENTMCNC: 31.7 G/DL (ref 32–36)
MCV RBC AUTO: 87 FL (ref 82–98)
MONOCYTES # BLD AUTO: 0.9 K/UL (ref 0.3–1)
MONOCYTES NFR BLD: 7.9 % (ref 4–15)
MV MEAN GRADIENT: 4 MMHG
MV PEAK A VEL: 1.25 M/S
MV PEAK E VEL: 1.07 M/S
MV STENOSIS PRESSURE HALF TIME: 47.33 MS
MV VALVE AREA P 1/2 METHOD: 4.65 CM2
NEUTROPHILS # BLD AUTO: 5.8 K/UL (ref 1.8–7.7)
NEUTROPHILS NFR BLD: 54.2 % (ref 38–73)
NRBC BLD-RTO: 0 /100 WBC
PISA MRMAX VEL: 4.16 M/S
PISA TR MAX VEL: 2.7 M/S
PLATELET # BLD AUTO: 289 K/UL (ref 150–450)
PMV BLD AUTO: 11.9 FL (ref 9.2–12.9)
POCT GLUCOSE: 287 MG/DL (ref 70–110)
POCT GLUCOSE: 342 MG/DL (ref 70–110)
POCT GLUCOSE: 380 MG/DL (ref 70–110)
POCT GLUCOSE: 444 MG/DL (ref 70–110)
POTASSIUM SERPL-SCNC: 4.6 MMOL/L (ref 3.5–5.1)
PROT SERPL-MCNC: 6.7 G/DL (ref 6–8.4)
PULM VEIN S/D RATIO: 0.69
PV MEAN GRADIENT: 3 MMHG
PV PEAK D VEL: 0.59 M/S
PV PEAK GRADIENT: 6 MMHG
PV PEAK S VEL: 0.41 M/S
PV PEAK VELOCITY: 1.18 M/S
RA MAJOR: 4.91 CM
RA PRESSURE ESTIMATED: 0 MMHG
RA WIDTH: 4.4 CM
RBC # BLD AUTO: 3.77 M/UL (ref 4–5.4)
RIGHT VENTRICULAR END-DIASTOLIC DIMENSION: 3.86 CM
RV TB RVSP: 3 MMHG
RV TISSUE DOPPLER FREE WALL SYSTOLIC VELOCITY 1 (APICAL 4 CHAMBER VIEW): 10.19 CM/S
SINUS: 3 CM
SODIUM SERPL-SCNC: 133 MMOL/L (ref 136–145)
STJ: 2.96 CM
TDI LATERAL: 0.07 M/S
TDI SEPTAL: 0.05 M/S
TDI: 0.06 M/S
TR MAX PG: 29 MMHG
TRICUSPID ANNULAR PLANE SYSTOLIC EXCURSION: 2.2 CM
TROPONIN I SERPL DL<=0.01 NG/ML-MCNC: 0.01 NG/ML (ref 0–0.03)
TROPONIN I SERPL DL<=0.01 NG/ML-MCNC: 0.01 NG/ML (ref 0–0.03)
TV REST PULMONARY ARTERY PRESSURE: 29 MMHG
WBC # BLD AUTO: 10.76 K/UL (ref 3.9–12.7)
Z-SCORE OF LEFT VENTRICULAR DIMENSION IN END DIASTOLE: -3.98
Z-SCORE OF LEFT VENTRICULAR DIMENSION IN END SYSTOLE: -1.56

## 2023-12-16 PROCEDURE — 96361 HYDRATE IV INFUSION ADD-ON: CPT

## 2023-12-16 PROCEDURE — 93306 TTE W/DOPPLER COMPLETE: CPT | Mod: 26,,, | Performed by: INTERNAL MEDICINE

## 2023-12-16 PROCEDURE — 94761 N-INVAS EAR/PLS OXIMETRY MLT: CPT

## 2023-12-16 PROCEDURE — 96372 THER/PROPH/DIAG INJ SC/IM: CPT | Performed by: INTERNAL MEDICINE

## 2023-12-16 PROCEDURE — 63600175 PHARM REV CODE 636 W HCPCS: Performed by: INTERNAL MEDICINE

## 2023-12-16 PROCEDURE — 63600175 PHARM REV CODE 636 W HCPCS: Mod: UD | Performed by: NURSE PRACTITIONER

## 2023-12-16 PROCEDURE — 96360 HYDRATION IV INFUSION INIT: CPT

## 2023-12-16 PROCEDURE — 96372 THER/PROPH/DIAG INJ SC/IM: CPT | Performed by: PHYSICIAN ASSISTANT

## 2023-12-16 PROCEDURE — G0378 HOSPITAL OBSERVATION PER HR: HCPCS

## 2023-12-16 PROCEDURE — 63600175 PHARM REV CODE 636 W HCPCS: Performed by: PHYSICIAN ASSISTANT

## 2023-12-16 PROCEDURE — 85025 COMPLETE CBC W/AUTO DIFF WBC: CPT | Performed by: NURSE PRACTITIONER

## 2023-12-16 PROCEDURE — 25000003 PHARM REV CODE 250: Performed by: INTERNAL MEDICINE

## 2023-12-16 PROCEDURE — 93306 TTE W/DOPPLER COMPLETE: CPT

## 2023-12-16 PROCEDURE — 84484 ASSAY OF TROPONIN QUANT: CPT | Mod: 91 | Performed by: NURSE PRACTITIONER

## 2023-12-16 PROCEDURE — 36415 COLL VENOUS BLD VENIPUNCTURE: CPT | Performed by: NURSE PRACTITIONER

## 2023-12-16 PROCEDURE — 84484 ASSAY OF TROPONIN QUANT: CPT | Performed by: NURSE PRACTITIONER

## 2023-12-16 PROCEDURE — 96372 THER/PROPH/DIAG INJ SC/IM: CPT | Performed by: NURSE PRACTITIONER

## 2023-12-16 PROCEDURE — 25000003 PHARM REV CODE 250: Performed by: NURSE PRACTITIONER

## 2023-12-16 PROCEDURE — 93306 ECHO (CUPID ONLY): ICD-10-PCS | Mod: 26,,, | Performed by: INTERNAL MEDICINE

## 2023-12-16 PROCEDURE — 80053 COMPREHEN METABOLIC PANEL: CPT | Performed by: NURSE PRACTITIONER

## 2023-12-16 RX ORDER — INSULIN ASPART 100 [IU]/ML
10 INJECTION, SOLUTION INTRAVENOUS; SUBCUTANEOUS
Status: DISCONTINUED | OUTPATIENT
Start: 2023-12-16 | End: 2023-12-16 | Stop reason: HOSPADM

## 2023-12-16 RX ORDER — SODIUM CHLORIDE, SODIUM LACTATE, POTASSIUM CHLORIDE, CALCIUM CHLORIDE 600; 310; 30; 20 MG/100ML; MG/100ML; MG/100ML; MG/100ML
INJECTION, SOLUTION INTRAVENOUS CONTINUOUS
Status: ACTIVE | OUTPATIENT
Start: 2023-12-16 | End: 2023-12-16

## 2023-12-16 RX ADMIN — INSULIN HUMAN 8 UNITS: 100 INJECTION, SOLUTION PARENTERAL at 05:12

## 2023-12-16 RX ADMIN — SENNOSIDES AND DOCUSATE SODIUM 1 TABLET: 8.6; 5 TABLET ORAL at 08:12

## 2023-12-16 RX ADMIN — PANTOPRAZOLE SODIUM 40 MG: 40 TABLET, DELAYED RELEASE ORAL at 08:12

## 2023-12-16 RX ADMIN — HYDROCODONE BITARTRATE AND ACETAMINOPHEN 1 TABLET: 10; 325 TABLET ORAL at 08:12

## 2023-12-16 RX ADMIN — INSULIN DETEMIR 18 UNITS: 100 INJECTION, SOLUTION SUBCUTANEOUS at 06:12

## 2023-12-16 RX ADMIN — SERTRALINE 100 MG: 100 TABLET, FILM COATED ORAL at 08:12

## 2023-12-16 RX ADMIN — ENOXAPARIN SODIUM 40 MG: 40 INJECTION SUBCUTANEOUS at 04:12

## 2023-12-16 RX ADMIN — FUROSEMIDE 40 MG: 20 TABLET ORAL at 08:12

## 2023-12-16 RX ADMIN — INSULIN ASPART 10 UNITS: 100 INJECTION, SOLUTION INTRAVENOUS; SUBCUTANEOUS at 01:12

## 2023-12-16 RX ADMIN — ASPIRIN 81 MG: 81 TABLET, COATED ORAL at 08:12

## 2023-12-16 RX ADMIN — INSULIN ASPART 10 UNITS: 100 INJECTION, SOLUTION INTRAVENOUS; SUBCUTANEOUS at 04:12

## 2023-12-16 RX ADMIN — INSULIN ASPART 10 UNITS: 100 INJECTION, SOLUTION INTRAVENOUS; SUBCUTANEOUS at 06:12

## 2023-12-16 RX ADMIN — METOPROLOL SUCCINATE 100 MG: 50 TABLET, EXTENDED RELEASE ORAL at 08:12

## 2023-12-16 RX ADMIN — CLONIDINE HYDROCHLORIDE 0.1 MG: 0.1 TABLET ORAL at 08:12

## 2023-12-16 RX ADMIN — HYDROCODONE BITARTRATE AND ACETAMINOPHEN 1 TABLET: 10; 325 TABLET ORAL at 04:12

## 2023-12-16 RX ADMIN — INSULIN ASPART 5 UNITS: 100 INJECTION, SOLUTION INTRAVENOUS; SUBCUTANEOUS at 08:12

## 2023-12-16 RX ADMIN — PREGABALIN 150 MG: 75 CAPSULE ORAL at 08:12

## 2023-12-16 RX ADMIN — NIFEDIPINE 30 MG: 30 TABLET, FILM COATED, EXTENDED RELEASE ORAL at 08:12

## 2023-12-16 RX ADMIN — PREGABALIN 150 MG: 75 CAPSULE ORAL at 04:12

## 2023-12-16 RX ADMIN — ALLOPURINOL 300 MG: 300 TABLET ORAL at 08:12

## 2023-12-16 RX ADMIN — SODIUM CHLORIDE, POTASSIUM CHLORIDE, SODIUM LACTATE AND CALCIUM CHLORIDE: 600; 310; 30; 20 INJECTION, SOLUTION INTRAVENOUS at 06:12

## 2023-12-16 NOTE — ASSESSMENT & PLAN NOTE
Patient with hypertension controlled with losartan, metoprolol, nifedipine and clonidine    -continue losartan 50 mg daily  -continue metoprolol 100 mg daily  -continue clonidine 0.1 mg twice daily    Closely monitor blood pressure utilize p.r.n. hydralazine for SBP > 180 and diastolic > 110

## 2023-12-16 NOTE — ASSESSMENT & PLAN NOTE
Patient's DM type 2 is controlled with Levemir and aspart insulin at home.  Patient states she is waiting to receive her Dexcom  Lab Results   Component Value Date    HGBA1C 10.4 (H) 12/14/2023       -POCT glucose 4x daily  -SSI insulin moderate dose

## 2023-12-16 NOTE — ASSESSMENT & PLAN NOTE
Patient with known CAD s/p stent placement, which is controlled Will continue ASA and monitor for S/Sx of angina/ACS. Continue to monitor on telemetry.  Patient unable to tolerate atorvastatin and states she receives injections monthly.  Last lipid panel 10/26/23    -continue aspirin 81 mg daily  -telemetry monitoring

## 2023-12-16 NOTE — FIRST PROVIDER EVALUATION
Emergency Department TeleTriage Encounter Note      CHIEF COMPLAINT    Chief Complaint   Patient presents with    Chest Pain     Non-radiating intermittent left sided chest pain that started two days ago  Described as a throbbing pain    Pt took her own sublingual nitro today around 4pm   Dr. Zapata states her white blood count is really high       VITAL SIGNS   Initial Vitals [12/15/23 1809]   BP Pulse Resp Temp SpO2   138/63 92 17 98.4 °F (36.9 °C) --      MAP       --            ALLERGIES    Review of patient's allergies indicates:   Allergen Reactions    Bleach (sodium hypochlorite) Shortness Of Breath    Nitrofurantoin macrocrystalline Anaphylaxis    Lipitor [atorvastatin] Diarrhea and Rash    Nsaids (non-steroidal anti-inflammatory drug)     Pcn [penicillins]     Toradol [ketorolac]        PROVIDER TRIAGE NOTE  Verbal consent for the teletriage evaluation was given by the patient at the start of the evaluation.  All efforts will be made to maintain patient's privacy during the evaluation.      This is a teletriage evaluation of a 78 y.o. female presenting to the ED with c/o elevated WBC.  Had labs and PCP called and instructed to come to the ED.  Also reports chest pain that started 2-3 days ago.  Taking more nitro than usual. Limited physical exam via telehealth: The patient is awake, alert, answering questions appropriately and is not in respiratory distress.  As the Teletriage provider, I performed an initial assessment and ordered appropriate labs and imaging studies, if any, to facilitate the patient's care once placed in the ED. Once a room is available, care and a full evaluation will be completed by an alternate ED provider.  Any additional orders and the final disposition will be determined by that provider.  All imaging and labs will not be followed-up by the Teletriage Team, including myself.          ORDERS  Labs Reviewed   HIV 1 / 2 ANTIBODY   HEPATITIS C ANTIBODY   CBC W/ AUTO DIFFERENTIAL    COMPREHENSIVE METABOLIC PANEL   TROPONIN I   TROPONIN I   B-TYPE NATRIURETIC PEPTIDE       ED Orders (720h ago, onward)      Start Ordered     Status Ordering Provider    12/15/23 2203 12/15/23 1902  Troponin I #2  Once Timed         Ordered SANDI JIMÉNEZ    12/15/23 1903 12/15/23 1902  Vital signs  Every 15 min         Ordered SANDI JIMÉNEZ    12/15/23 1903 12/15/23 1902  Cardiac Monitoring - Adult  Continuous        Comments: Notify Physician If:    Ordered SANDI JIMÉNEZ    12/15/23 1903 12/15/23 1902  Pulse Oximetry Continuous  Continuous         Ordered SANDI JIMÉNEZ    12/15/23 1903 12/15/23 1902  Diet NPO  Diet effective now         Ordered SANDI JIMÉNEZ    12/15/23 1903 12/15/23 1902  Saline lock IV  Once         Ordered SANDI JIMÉNEZ    12/15/23 1903 12/15/23 1902  EKG 12-lead  Once        Comments: Do not perform if previously done during this visit/ triage    Ordered SANDI JIMÉNEZ    12/15/23 1903 12/15/23 1902  CBC auto differential  STAT         Ordered SANDI JIMÉNEZ    12/15/23 1903 12/15/23 1902  Comprehensive metabolic panel  STAT         Ordered SANDI JIMÉNEZ    12/15/23 1903 12/15/23 1902  Troponin I #1  STAT         Ordered SANDI JIMÉNEZ    12/15/23 1903 12/15/23 1902  B-Type natriuretic peptide (BNP)  STAT         Ordered SANDI JIMÉNEZ    12/15/23 1903 12/15/23 1902  X-Ray Chest PA And Lateral  1 time imaging         Ordered SANDI JIMÉNEZ    12/15/23 1809 12/15/23 1808  HIV 1/2 Ag/Ab (4th Gen)  STAT         Ordered MARIA G HELTON    12/15/23 1809 12/15/23 1808  Hepatitis C Antibody  STAT         Ordered MARIA G HELTON    12/15/23 1809 12/15/23 1808  EKG 12-lead (Chest Pain) Age >30  Once        Comments: If patient > 30 yrs.    Completed by ITALO HURD on 12/15/2023 at  6:09 PM MARIA G HELTON              Virtual Visit Note: The provider triage portion of this emergency department evaluation and documentation was performed via [employer], a HIPAA-compliant telemedicine application,  in concert with a tele-presenter in the room. A face to face patient evaluation with one of my colleagues will occur once the patient is placed in an emergency department room.      DISCLAIMER: This note was prepared with [employer] voice recognition transcription software. Garbled syntax, mangled pronouns, and other bizarre constructions may be attributed to that software system.

## 2023-12-16 NOTE — PLAN OF CARE
MSW met with the patient at the bedside. The patient's daughter Esha is also at the bedside.     Patient is alert and oriented with no communication barriers.     Patient reported having DME at home. ( [facility])    Patients PCP is correct on the face sheet. [facility] pharmacy is bedside delivery.     Patients' family will transport the patient home at discharge.     CM team will continue to follow.      12/16/23 0806   Discharge Assessment   Assessment Type Discharge Planning Assessment   Confirmed/corrected address, phone number and insurance Yes   Confirmed Demographics Correct on Facesheet   Source of Information patient;family;health record   People in Home child(georges), adult   Do you expect to return to your current living situation? Yes   Do you have help at home or someone to help you manage your care at home? Yes   Prior to hospitilization cognitive status: Alert/Oriented   Current cognitive status: Alert/Oriented   Walking or Climbing Stairs Difficulty yes   Walking or Climbing Stairs ambulation difficulty, requires equipment   Dressing/Bathing Difficulty no   Equipment Currently Used at Home cane, straight;bedside commode;wheelchair   Readmission within 30 days? No   Patient currently being followed by outpatient case management? No   Do you currently have service(s) that help you manage your care at home? No   Do you take prescription medications? Yes   Do you have prescription coverage? Yes   Do you have any problems affording any of your prescribed medications? No   Is the patient taking medications as prescribed? yes   How do you get to doctors appointments? family or friend will provide   Are you on dialysis? No   Do you take coumadin? No   Discharge Plan A Home with family   Discharge Plan B Home Health   DME Needed Upon Discharge  none   Discharge Plan discussed with: Patient;Adult children   Transition of Care Barriers None   Physical Activity   On average, how many days per week do you  engage in moderate to strenuous exercise (like a brisk walk)? 2 days   On average, how many minutes do you engage in exercise at this level? 30 min   Financial Resource Strain   How hard is it for you to pay for the very basics like food, housing, medical care, and heating? Not hard   Housing Stability   In the last 12 months, was there a time when you were not able to pay the mortgage or rent on time? N   In the last 12 months, was there a time when you did not have a steady place to sleep or slept in a shelter (including now)? N   Transportation Needs   In the past 12 months, has lack of transportation kept you from medical appointments or from getting medications? no   In the past 12 months, has lack of transportation kept you from meetings, work, or from getting things needed for daily living? No   Food Insecurity   Within the past 12 months, you worried that your food would run out before you got the money to buy more. Never true   Within the past 12 months, the food you bought just didn't last and you didn't have money to get more. Never true   Stress   Do you feel stress - tense, restless, nervous, or anxious, or unable to sleep at night because your mind is troubled all the time - these days? Not at all   Social Connections   In a typical week, how many times do you talk on the phone with family, friends, or neighbors? Never   How often do you get together with friends or relatives? Never   How often do you attend Shinto or Synagogue services? Never   Do you belong to any clubs or organizations such as Shinto groups, unions, fraternal or athletic groups, or school groups? No   How often do you attend meetings of the clubs or organizations you belong to? 1 to 4   Are you , , , , never , or living with a partner?    Alcohol Use   Q1: How often do you have a drink containing alcohol? Never   Q2: How many drinks containing alcohol do you have on a typical day when you  are drinking? None   Q3: How often do you have six or more drinks on one occasion? Never

## 2023-12-16 NOTE — HOSPITAL COURSE
Admitted with chest pain and trended troponins. Pain resolved and did not recur. Troponins remained stable in normal range. Repeat echocardiography performed showing overall stability from 05/2022 though technically difficult study. With clinical improvement and vital stability, she was prepared for discharge home; recommended follow-up with PCP and cardiology in near future and counseled regarding return precautions.

## 2023-12-16 NOTE — ASSESSMENT & PLAN NOTE
Patient with depressive disorder controlled with sertraline    -continue sertraline 100 mg daily

## 2023-12-16 NOTE — ASSESSMENT & PLAN NOTE
History noted. Patient takes Lyrica, Norco and Robaxin for pain.  She is followed by pain management out patient    -continue Lyrica 150 mg TID  -continue Robaxin 500 mg  -continue Norco 10 mg-325 mg t.i.d.

## 2023-12-16 NOTE — HPI
Indira Waters is a 78-year-old female with a past medical history of arthritis, DM, CAD, fibromyalgia, hypertension, hyperlipidemia, past MI, CVA and lupus who presents with intermittent chest pain for the past 2 days.  Patient states pain was relieved with nitroglycerin that she used twice yesterday and once today.  Upon arrival to the ED, patient complaining of chest pain that she describes as aching, pressure and stabbing that occurs at rest and with activity. Patient has a history of fibromyalgia and lupus and states this pain is different than with her past Lupus flare ups. Patient has history of chronic dyspnea and states she is at baseline.  Patient also reports she was notified by her PCP that her WBC was elevated.  WBC was 15.08 on 12/14/2023 and 11.82 on lab work performed today in the ED. Patient denies fever, chills, nausea and vomiting.     EKGs performed in the ED showed normal sinus rhythm with no ST elevation or depression and repeat EKG was unchanged.  Initial troponin 0.019 and CRP 17.6.  Lab work otherwise unremarkable.  COVID and flu were negative.  Chest x-ray showed no acute cardiopulmonary process. Patient received sublingual nitroglycerin in the ED  and nitro paste was applied with relief. Patient is followed by Dr Méndez, cardiology. Patient will be admitted to hospital medicine for further evaluation and management.

## 2023-12-16 NOTE — NURSING
Nurses Note -- 4 Eyes      12/15/2023  2226      Skin assessed during: Admit      [x] No Altered Skin Integrity Present    []Prevention Measures Documented      [] Yes- Altered Skin Integrity Present or Discovered   [] LDA Added if Not in Epic (Describe Wound)   [] New Altered Skin Integrity was Present on Admit and Documented in LDA   [] Wound Image Taken    Wound Care Consulted? No    Attending Nurse:  River Swenson RN/Staff Member:   Porsha

## 2023-12-16 NOTE — ED TRIAGE NOTES
"Pt sent in by her provider for high WBC count.  Pt also reports CP x 2 days.  Pt states CP relieved with NTG SL, states she has been having to take the NTG more than usual.  Pt endorses SOB, weakness, and dizziness.  Endorses productive cough.  Pt denies any fever.  Pt reports hx of autoimmune diseases, states, "I stay sick."  Pt answering questions appropriately, speaking in complete sentences, respirations even and unlabored, NAD noted.  Aao x 4.    "

## 2023-12-16 NOTE — ASSESSMENT & PLAN NOTE
Patient reports history of lupus.  Per chart review STEVE normal 2021 and 2022 and patient not currently receiving treatment for lupus.  CRP elevated 17.6 today, c3 & c4 pending

## 2023-12-16 NOTE — ASSESSMENT & PLAN NOTE
Patient with current chest pain relieved with nitroglycerin.  Past history of MI. initial troponin negative.  Last echocardiogram 05/2022 revealed EF 55 and diastolic dysfunction.  Patient received nitroglycerin sublingually x2 and nitro paste in the ED with relief from chest pain  Patient is followed by Dr. Méndez outpatient cardiologist    -telemetry monitoring  -trending troponin  -echo pending  -nitroglycerin p.r.n. chest pain

## 2023-12-16 NOTE — PLAN OF CARE
Patient and daughter verbalized understanding of discharge plan.  The patient denies any questions or concerns.

## 2023-12-16 NOTE — H&P
[address] Medicine  History & Physical    Patient Name: Indira Waters  MRN: [id number]  Patient Class: OP- Observation  Admission Date: 12/15/2023  Attending Physician: HEAVENLY Perez MD   Primary Care Provider: Chun Zapata MD         Patient information was obtained from patient, past medical records, and ER records.     Subjective:     Principal Problem:Chest pain with high risk for cardiac etiology    Chief Complaint:   Chief Complaint   Patient presents with    Chest Pain     Non-radiating intermittent left sided chest pain that started two days ago  Described as a throbbing pain    Pt took her own sublingual nitro today around 4pm   Dr. Zapata states her white blood count is really high        HPI: Indira Waters is a 78-year-old female with a past medical history of arthritis, DM, CAD, fibromyalgia, hypertension, hyperlipidemia, past MI, CVA and lupus who presents with intermittent chest pain for the past 2 days.  Patient states pain was relieved with nitroglycerin that she used twice yesterday and once today.  Upon arrival to the ED, patient complaining of chest pain that she describes as aching, pressure and stabbing that occurs at rest and with activity. Patient has a history of fibromyalgia and lupus and states this pain is different than with her past Lupus flare ups. Patient has history of chronic dyspnea and states she is at baseline.  Patient also reports she was notified by her PCP that her WBC was elevated.  WBC was 15.08 on 12/14/2023 and 11.82 on lab work performed today in the ED. Patient denies fever, chills, nausea and vomiting.     EKGs performed in the ED showed normal sinus rhythm with no ST elevation or depression and repeat EKG was unchanged.  Initial troponin 0.019 and CRP 17.6.  Lab work otherwise unremarkable.  COVID and flu were negative.  Chest x-ray showed no acute cardiopulmonary process. Patient received sublingual  nitroglycerin in the ED  and nitro paste was applied with relief. Patient is followed by Dr Méndez, cardiology. Patient will be placed in observation under hospital medicine for further evaluation and management.           Past Medical History:   Diagnosis Date    Arthritis     Back pain     Cancer     ovarian    Cervical cancer     Coronary artery disease     Depression     Diabetes mellitus     Fibromyalgia     Heart attack     History of MI (myocardial infarction)     Hyperlipidemia     Hypertension     Lupus     Stroke     slight left sided weakness       Past Surgical History:   Procedure Laterality Date    APPENDECTOMY       SECTION      2    CORONARY ANGIOGRAPHY N/A 2022    Procedure: ANGIOGRAM, CORONARY ARTERY;  Surgeon: Jose L Méndez MD;  Location: [facility] CATH LAB;  Service: Cardiology;  Laterality: N/A;    HYSTERECTOMY      with USO for cervical cancer    INJECTION OF ANESTHETIC AGENT AROUND NERVE Bilateral 2021    Procedure: BLOCK, NERVE, SYMPATHIC;  Surgeon: Holden Pereira MD;  Location: [facility] PAIN MGT;  Service: Pain Management;  Laterality: Bilateral;    INJECTION OF ANESTHETIC AGENT AROUND NERVE N/A 2021    Procedure: BLOCK, NERVE, SYMPATHETIC  need consent;  Surgeon: Holden Pereira MD;  Location: [facility] PAIN MGT;  Service: Pain Management;  Laterality: N/A;    ID EVAL,SWALLOW FUNCTION,CINE/VIDEO RECORD  2021         TONSILLECTOMY      TRIAL OF SPINAL CORD NERVE STIMULATOR N/A 3/8/2023    Procedure: LUMBAR SPINAL CORD STIMULATOR TRIAL NEVRO REP PATIENT STATES SHE NO LONGER TAKES PLAVIX;  Surgeon: Holden Pereira MD;  Location: [facility] PAIN MGT;  Service: Pain Management;  Laterality: N/A;       Review of patient's allergies indicates:   Allergen Reactions    Bleach (sodium hypochlorite) Shortness Of Breath    Nitrofurantoin macrocrystalline Anaphylaxis    Lipitor [atorvastatin] Diarrhea and Rash    Nsaids (non-steroidal anti-inflammatory drug)      Tolerates  aspirin      Pcn [penicillins]     Toradol [ketorolac]        No current facility-administered medications on file prior to encounter.     Current Outpatient Medications on File Prior to Encounter   Medication Sig    acetaminophen (TYLENOL) 500 MG tablet Take 2 tablets (1,000 mg total) by mouth every 8 (eight) hours as needed for Pain.    allopurinoL (ZYLOPRIM) 300 MG tablet Take 1 tablet (300 mg total) by mouth once daily.    aspirin (ECOTRIN) 81 MG EC tablet Take 1 tablet (81 mg total) by mouth once daily.    blood sugar diagnostic Strp To check BG 6 times daily, to use with insurance preferred meter    blood-glucose meter kit To check BG 6 times daily, to use with insurance preferred meter    blood-glucose meter,continuous (DEXCOM G7 ) Misc Use as directed.    blood-glucose sensor (DEXCOM G7 SENSOR) Nicole Change every 10 days.    cloNIDine (CATAPRES) 0.1 MG tablet Take 1 tablet (0.1 mg total) by mouth 2 (two) times daily.    evolocumab (REPATHA SURECLICK) 140 mg/mL PnIj Inject 1 mL (140 mg total) into the skin every 14 (fourteen) days.    fenofibrate micronized (LOFIBRA) 134 MG Cap Take 1 capsule (134 mg total) by mouth daily with breakfast.    furosemide (LASIX) 40 MG tablet TAKE ONE TABLET BY MOUTH EVERY DAY    HYDROcodone-acetaminophen (NORCO)  mg per tablet Take 1 tablet by mouth every 8 (eight) hours as needed for Pain.    hydrocortisone (ANUSOL-HC) 2.5 % rectal cream Procto-Med HC 2.5 % topical cream perineal applicator    insulin aspart U-100 (NOVOLOG FLEXPEN U-100 INSULIN) 100 unit/mL (3 mL) InPn pen Inject 14 Units into the skin 3 (three) times daily with meals. + meal correction scale. Max 40 units    insulin detemir U-100, Levemir, (LEVEMIR FLEXTOUCH U100 INSULIN) 100 unit/mL (3 mL) InPn pen Inject 20 Units into the skin once daily AND 18 Units every evening.    ipratropium-albuteroL (COMBIVENT)  mcg/actuation inhaler Inhale 1 puff into the lungs by mouth every 6 (six) hours.     "lancets Misc To check BG 6 times daily, to use with insurance preferred meter    losartan (COZAAR) 50 MG tablet Take 1 tablet (50 mg total) by mouth once daily.    methocarbamoL (ROBAXIN) 500 MG Tab Take 1 tablet (500 mg total) by mouth 3 (three) times daily as needed (muscle spasm).    metoclopramide HCl (REGLAN) 5 MG tablet TAKE ONE TABLET BY MOUTH FOUR TIMES DAILY AS NEEDED FOR NAUSEA PREVENTION    metoprolol succinate (TOPROL-XL) 100 MG 24 hr tablet Take 1 tablet (100 mg total) by mouth once daily.    mupirocin (BACTROBAN) 2 % ointment Apply topically 3 (three) times daily as needed (skin breakdown).    NIFEdipine (PROCARDIA-XL) 30 MG (OSM) 24 hr tablet Take 1 tablet (30 mg total) by mouth 2 (two) times a day.    nitroGLYCERIN (NITROSTAT) 0.4 MG SL tablet Place 1 tablet (0.4 mg total) under the tongue every 5 (five) minutes as needed for Chest pain.    pantoprazole (PROTONIX) 40 MG tablet Take 1 tablet (40 mg total) by mouth once daily.    pen needle, diabetic 31 gauge x 1/4" Ndle 1 each by Misc.(Non-Drug; Combo Route) route 5 (five) times daily.    pregabalin (LYRICA) 150 MG capsule TAKE ONE CAPSULE BY MOUTH 3 TIMES DAILY    senna-docusate 8.6-50 mg (PERICOLACE) 8.6-50 mg per tablet Take 1 tablet by mouth 2 (two) times daily as needed for Constipation.    sertraline (ZOLOFT) 100 MG tablet Take 1 tablet (100 mg total) by mouth once daily.    teriparatide 20 mcg/dose (620mcg/2.48mL) PnIj Inject 20 mcg into the skin once daily.    triamcinolone acetonide 0.1% (KENALOG) 0.1 % cream Apply to affected areas of body twice daily as needed rash. Do not use on face, underarms, or groin.    [DISCONTINUED] blood-glucose transmitter (DEXCOM G6 TRANSMITTER) Nicole 1 each by Misc.(Non-Drug; Combo Route) route continuous prn.     Family History       Problem Relation (Age of Onset)    COPD Mother    Colon cancer Maternal Grandmother    Coronary artery disease Father    Diabetes Father    Hernia Mother    Lupus Mother    Ovarian " cancer Mother    Uterine cancer Mother          Tobacco Use    Smoking status: Former     Current packs/day: 0.00     Types: Cigarettes     Quit date: 11/2020     Years since quitting: 3.1     Passive exposure: Past    Smokeless tobacco: Never   Substance and Sexual Activity    Alcohol use: Not Currently     Comment: occasionally    Drug use: Yes     Types: Hydrocodone     Comment: three times a day    Sexual activity: Not Currently     Review of Systems   Constitutional:  Negative for activity change, appetite change, chills and fever.   HENT:  Negative for congestion, sore throat and trouble swallowing.    Eyes:  Negative for photophobia and visual disturbance.   Respiratory:  Positive for shortness of breath. Negative for cough and chest tightness.    Cardiovascular:  Positive for chest pain. Negative for palpitations and leg swelling.   Gastrointestinal:  Negative for abdominal pain, diarrhea and nausea.   Genitourinary:  Negative for dysuria, flank pain and hematuria.   Musculoskeletal:  Positive for myalgias. Negative for back pain.   Neurological:  Negative for dizziness, weakness and headaches.   Psychiatric/Behavioral:  Negative for confusion.      Objective:     Vital Signs (Most Recent):  Temp: 98.4 °F (36.9 °C) (12/15/23 1809)  Pulse: 77 (12/15/23 2049)  Resp: 20 (12/15/23 2049)  BP: 138/72 (12/15/23 2029)  SpO2: 97 % (12/15/23 2049) Vital Signs (24h Range):  Temp:  [98.4 °F (36.9 °C)] 98.4 °F (36.9 °C)  Pulse:  [77-92] 77  Resp:  [17-22] 20  SpO2:  [90 %-97 %] 97 %  BP: (117-138)/(58-72) 138/72     Weight: 81.6 kg (180 lb)  Body mass index is 34.01 kg/m².     Physical Exam  Vitals reviewed.   Constitutional:       Appearance: Normal appearance. She is obese.   HENT:      Head: Normocephalic.      Mouth/Throat:      Mouth: Mucous membranes are moist.      Pharynx: Oropharynx is clear.   Eyes:      Pupils: Pupils are equal, round, and reactive to light.   Cardiovascular:      Rate and Rhythm: Normal rate  and regular rhythm.      Pulses: Normal pulses.      Heart sounds: Normal heart sounds.   Pulmonary:      Effort: Pulmonary effort is normal.      Breath sounds: Normal breath sounds.   Abdominal:      General: Bowel sounds are normal.      Palpations: Abdomen is soft.   Musculoskeletal:         General: Normal range of motion.      Cervical back: Normal range of motion.      Right lower le+ Edema present.      Left lower le+ Edema present.   Skin:     General: Skin is warm and dry.   Neurological:      Mental Status: She is alert and oriented to person, place, and time. Mental status is at baseline.   Psychiatric:         Mood and Affect: Mood normal.              CRANIAL NERVES     CN III, IV, VI   Pupils are equal, round, and reactive to light.       Significant Labs: All pertinent labs within the past 24 hours have been reviewed.  CBC:   Recent Labs   Lab 23  1410 12/15/23  1940   WBC 15.08* 11.82   HGB 10.9* 11.1*   HCT 34.7* 34.3*    306     CMP:   Recent Labs   Lab 12/15/23  1940      K 4.7   CL 99   CO2 22*   *   BUN 19   CREATININE 1.2   CALCIUM 9.2   PROT 7.5   ALBUMIN 3.6   BILITOT 0.2   ALKPHOS 65   AST 28   ALT 15   ANIONGAP 15       Significant Imaging: I have reviewed all pertinent imaging results/findings within the past 24 hours.  Imaging Results              X-Ray Chest 1 View (Final result)  Result time 12/15/23 20:00:45      Final result by Jami Cary MD (12/15/23 20:00:45)                   Impression:      No acute cardiopulmonary process identified.      Electronically signed by: Jami Cary MD  Date:    12/15/2023  Time:    20:00               Narrative:    EXAMINATION:  XR CHEST 1 VIEW    CLINICAL HISTORY:  Chest pain, unspecified    TECHNIQUE:  Single frontal view of the chest was performed.    COMPARISON:  2022.    FINDINGS:  Cardiac silhouette is normal in size.  Lungs are symmetrically expanded.  No evidence of focal consolidative  process, pneumothorax, or significant pleural effusion.  No acute osseous abnormality identified.                                      Assessment/Plan:     * Chest pain with high risk for cardiac etiology  Patient with current chest pain relieved with nitroglycerin.  Past history of MI. initial troponin negative.  Last echocardiogram 05/2022 revealed EF 55 and diastolic dysfunction.  Patient received nitroglycerin sublingually x2 and nitro paste in the ED with relief from chest pain  Patient is followed by Dr. Méndez outpatient cardiologist    -telemetry monitoring  -trending troponin  -echo pending  -nitroglycerin p.r.n. chest pain      Systemic lupus erythematosus, unspecified  Patient reports history of lupus.  Per chart review STEVE normal 2021 and 2022 and patient not currently receiving treatment for lupus.  CRP elevated 17.6 today, c3 & c4 pending          Recurrent major depressive disorder, in remission  Patient with depressive disorder controlled with sertraline    -continue sertraline 100 mg daily      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 pectoris  Patient with known CAD s/p stent placement, which is controlled Will continue ASA and monitor for S/Sx of angina/ACS. Continue to monitor on telemetry.  Patient unable to tolerate atorvastatin and states she receives injections monthly.  Last lipid panel 10/26/23    -continue aspirin 81 mg daily  -telemetry monitoring      Essential hypertension  Patient with hypertension controlled with losartan, metoprolol, nifedipine and clonidine    -continue losartan 50 mg daily  -continue metoprolol 100 mg daily  -continue clonidine 0.1 mg twice daily    Closely monitor blood pressure utilize p.r.n. hydralazine for SBP > 180 and diastolic > 110    Fibromyalgia  History noted. Patient takes Lyrica, Norco and Robaxin for pain.  She is followed by pain management out patient    -continue Lyrica 150 mg TID  -continue Robaxin 500  mg  -continue Norco 10 mg-325 mg t.i.d.      Type 2 diabetes mellitus with hyperglycemia  Patient's DM type 2 is controlled with Levemir and aspart insulin at home.  Patient states she is waiting to receive her Dexcom  Lab Results   Component Value Date    HGBA1C 10.4 (H) 12/14/2023       -POCT glucose 4x daily  -SSI insulin moderate dose        Gastroesophageal reflux disease without esophagitis  History noted and controlled with Protonix    -continue Protonix 40 mg daily        VTE Risk Mitigation (From admission, onward)           Ordered     enoxaparin injection 40 mg  Daily         12/15/23 2154     IP VTE HIGH RISK PATIENT  Once         12/15/23 2154     Place sequential compression device  Until discontinued         12/15/23 2154     Place sequential compression device  Until discontinued         12/15/23 2154                         Patient will be placed in observation       Carleen Bagley NP  Department of Hospital Medicine  [facility] ([address])

## 2023-12-16 NOTE — SUBJECTIVE & OBJECTIVE
Past Medical History:   Diagnosis Date    Arthritis     Back pain     Cancer     ovarian    Cervical cancer     Coronary artery disease     Depression     Diabetes mellitus     Fibromyalgia     Heart attack     History of MI (myocardial infarction)     Hyperlipidemia     Hypertension     Lupus     Stroke     slight left sided weakness       Past Surgical History:   Procedure Laterality Date    APPENDECTOMY       SECTION      2    CORONARY ANGIOGRAPHY N/A 2022    Procedure: ANGIOGRAM, CORONARY ARTERY;  Surgeon: Jose L Méndez MD;  Location: [facility] CATH LAB;  Service: Cardiology;  Laterality: N/A;    HYSTERECTOMY      with USO for cervical cancer    INJECTION OF ANESTHETIC AGENT AROUND NERVE Bilateral 2021    Procedure: BLOCK, NERVE, SYMPATHIC;  Surgeon: Holden Pereira MD;  Location: [facility] PAIN MGT;  Service: Pain Management;  Laterality: Bilateral;    INJECTION OF ANESTHETIC AGENT AROUND NERVE N/A 2021    Procedure: BLOCK, NERVE, SYMPATHETIC  need consent;  Surgeon: Holden Pereira MD;  Location: [facility] PAIN MGT;  Service: Pain Management;  Laterality: N/A;    MA EVAL,SWALLOW FUNCTION,CINE/VIDEO RECORD  2021         TONSILLECTOMY      TRIAL OF SPINAL CORD NERVE STIMULATOR N/A 3/8/2023    Procedure: LUMBAR SPINAL CORD STIMULATOR TRIAL NEVRO REP PATIENT STATES SHE NO LONGER TAKES PLAVIX;  Surgeon: Holden Pereira MD;  Location: [facility] PAIN MGT;  Service: Pain Management;  Laterality: N/A;       Review of patient's allergies indicates:   Allergen Reactions    Bleach (sodium hypochlorite) Shortness Of Breath    Nitrofurantoin macrocrystalline Anaphylaxis    Lipitor [atorvastatin] Diarrhea and Rash    Nsaids (non-steroidal anti-inflammatory drug)      Tolerates aspirin      Pcn [penicillins]     Toradol [ketorolac]        No current facility-administered medications on file prior to encounter.     Current Outpatient Medications on File Prior to Encounter   Medication Sig     acetaminophen (TYLENOL) 500 MG tablet Take 2 tablets (1,000 mg total) by mouth every 8 (eight) hours as needed for Pain.    allopurinoL (ZYLOPRIM) 300 MG tablet Take 1 tablet (300 mg total) by mouth once daily.    aspirin (ECOTRIN) 81 MG EC tablet Take 1 tablet (81 mg total) by mouth once daily.    blood sugar diagnostic Strp To check BG 6 times daily, to use with insurance preferred meter    blood-glucose meter kit To check BG 6 times daily, to use with insurance preferred meter    blood-glucose meter,continuous (DEXCOM G7 ) Misc Use as directed.    blood-glucose sensor (DEXCOM G7 SENSOR) Nicole Change every 10 days.    cloNIDine (CATAPRES) 0.1 MG tablet Take 1 tablet (0.1 mg total) by mouth 2 (two) times daily.    evolocumab (REPATHA SURECLICK) 140 mg/mL PnIj Inject 1 mL (140 mg total) into the skin every 14 (fourteen) days.    fenofibrate micronized (LOFIBRA) 134 MG Cap Take 1 capsule (134 mg total) by mouth daily with breakfast.    furosemide (LASIX) 40 MG tablet TAKE ONE TABLET BY MOUTH EVERY DAY    HYDROcodone-acetaminophen (NORCO)  mg per tablet Take 1 tablet by mouth every 8 (eight) hours as needed for Pain.    hydrocortisone (ANUSOL-HC) 2.5 % rectal cream Procto-Med HC 2.5 % topical cream perineal applicator    insulin aspart U-100 (NOVOLOG FLEXPEN U-100 INSULIN) 100 unit/mL (3 mL) InPn pen Inject 14 Units into the skin 3 (three) times daily with meals. + meal correction scale. Max 40 units    insulin detemir U-100, Levemir, (LEVEMIR FLEXTOUCH U100 INSULIN) 100 unit/mL (3 mL) InPn pen Inject 20 Units into the skin once daily AND 18 Units every evening.    ipratropium-albuteroL (COMBIVENT)  mcg/actuation inhaler Inhale 1 puff into the lungs by mouth every 6 (six) hours.    lancets Misc To check BG 6 times daily, to use with insurance preferred meter    losartan (COZAAR) 50 MG tablet Take 1 tablet (50 mg total) by mouth once daily.    methocarbamoL (ROBAXIN) 500 MG Tab Take 1 tablet (500 mg  "total) by mouth 3 (three) times daily as needed (muscle spasm).    metoclopramide HCl (REGLAN) 5 MG tablet TAKE ONE TABLET BY MOUTH FOUR TIMES DAILY AS NEEDED FOR NAUSEA PREVENTION    metoprolol succinate (TOPROL-XL) 100 MG 24 hr tablet Take 1 tablet (100 mg total) by mouth once daily.    mupirocin (BACTROBAN) 2 % ointment Apply topically 3 (three) times daily as needed (skin breakdown).    NIFEdipine (PROCARDIA-XL) 30 MG (OSM) 24 hr tablet Take 1 tablet (30 mg total) by mouth 2 (two) times a day.    nitroGLYCERIN (NITROSTAT) 0.4 MG SL tablet Place 1 tablet (0.4 mg total) under the tongue every 5 (five) minutes as needed for Chest pain.    pantoprazole (PROTONIX) 40 MG tablet Take 1 tablet (40 mg total) by mouth once daily.    pen needle, diabetic 31 gauge x 1/4" Ndle 1 each by Misc.(Non-Drug; Combo Route) route 5 (five) times daily.    pregabalin (LYRICA) 150 MG capsule TAKE ONE CAPSULE BY MOUTH 3 TIMES DAILY    senna-docusate 8.6-50 mg (PERICOLACE) 8.6-50 mg per tablet Take 1 tablet by mouth 2 (two) times daily as needed for Constipation.    sertraline (ZOLOFT) 100 MG tablet Take 1 tablet (100 mg total) by mouth once daily.    teriparatide 20 mcg/dose (620mcg/2.48mL) PnIj Inject 20 mcg into the skin once daily.    triamcinolone acetonide 0.1% (KENALOG) 0.1 % cream Apply to affected areas of body twice daily as needed rash. Do not use on face, underarms, or groin.    [DISCONTINUED] blood-glucose transmitter (DEXCOM G6 TRANSMITTER) Nicole 1 each by Misc.(Non-Drug; Combo Route) route continuous prn.     Family History       Problem Relation (Age of Onset)    COPD Mother    Colon cancer Maternal Grandmother    Coronary artery disease Father    Diabetes Father    Hernia Mother    Lupus Mother    Ovarian cancer Mother    Uterine cancer Mother          Tobacco Use    Smoking status: Former     Current packs/day: 0.00     Types: Cigarettes     Quit date: 11/2020     Years since quitting: 3.1     Passive exposure: Past    " Smokeless tobacco: Never   Substance and Sexual Activity    Alcohol use: Not Currently     Comment: occasionally    Drug use: Yes     Types: Hydrocodone     Comment: three times a day    Sexual activity: Not Currently     Review of Systems   Constitutional:  Negative for activity change, appetite change, chills and fever.   HENT:  Negative for congestion, sore throat and trouble swallowing.    Eyes:  Negative for photophobia and visual disturbance.   Respiratory:  Positive for shortness of breath. Negative for cough and chest tightness.    Cardiovascular:  Positive for chest pain. Negative for palpitations and leg swelling.   Gastrointestinal:  Negative for abdominal pain, diarrhea and nausea.   Genitourinary:  Negative for dysuria, flank pain and hematuria.   Musculoskeletal:  Positive for myalgias. Negative for back pain.   Neurological:  Negative for dizziness, weakness and headaches.   Psychiatric/Behavioral:  Negative for confusion.      Objective:     Vital Signs (Most Recent):  Temp: 98.4 °F (36.9 °C) (12/15/23 1809)  Pulse: 77 (12/15/23 2049)  Resp: 20 (12/15/23 2049)  BP: 138/72 (12/15/23 2029)  SpO2: 97 % (12/15/23 2049) Vital Signs (24h Range):  Temp:  [98.4 °F (36.9 °C)] 98.4 °F (36.9 °C)  Pulse:  [77-92] 77  Resp:  [17-22] 20  SpO2:  [90 %-97 %] 97 %  BP: (117-138)/(58-72) 138/72     Weight: 81.6 kg (180 lb)  Body mass index is 34.01 kg/m².     Physical Exam  Vitals reviewed.   Constitutional:       Appearance: Normal appearance. She is obese.   HENT:      Head: Normocephalic.      Mouth/Throat:      Mouth: Mucous membranes are moist.      Pharynx: Oropharynx is clear.   Eyes:      Pupils: Pupils are equal, round, and reactive to light.   Cardiovascular:      Rate and Rhythm: Normal rate and regular rhythm.      Pulses: Normal pulses.      Heart sounds: Normal heart sounds.   Pulmonary:      Effort: Pulmonary effort is normal.      Breath sounds: Normal breath sounds.   Abdominal:      General: Bowel  sounds are normal.      Palpations: Abdomen is soft.   Musculoskeletal:         General: Normal range of motion.      Cervical back: Normal range of motion.      Right lower le+ Edema present.      Left lower le+ Edema present.   Skin:     General: Skin is warm and dry.   Neurological:      Mental Status: She is alert and oriented to person, place, and time. Mental status is at baseline.   Psychiatric:         Mood and Affect: Mood normal.              CRANIAL NERVES     CN III, IV, VI   Pupils are equal, round, and reactive to light.       Significant Labs: All pertinent labs within the past 24 hours have been reviewed.  CBC:   Recent Labs   Lab 23  1410 12/15/23  1940   WBC 15.08* 11.82   HGB 10.9* 11.1*   HCT 34.7* 34.3*    306     CMP:   Recent Labs   Lab 12/15/23  1940      K 4.7   CL 99   CO2 22*   *   BUN 19   CREATININE 1.2   CALCIUM 9.2   PROT 7.5   ALBUMIN 3.6   BILITOT 0.2   ALKPHOS 65   AST 28   ALT 15   ANIONGAP 15       Significant Imaging: I have reviewed all pertinent imaging results/findings within the past 24 hours.  Imaging Results              X-Ray Chest 1 View (Final result)  Result time 12/15/23 20:00:45      Final result by Jami Cary MD (12/15/23 20:00:45)                   Impression:      No acute cardiopulmonary process identified.      Electronically signed by: Jami Cary MD  Date:    12/15/2023  Time:    20:00               Narrative:    EXAMINATION:  XR CHEST 1 VIEW    CLINICAL HISTORY:  Chest pain, unspecified    TECHNIQUE:  Single frontal view of the chest was performed.    COMPARISON:  2022.    FINDINGS:  Cardiac silhouette is normal in size.  Lungs are symmetrically expanded.  No evidence of focal consolidative process, pneumothorax, or significant pleural effusion.  No acute osseous abnormality identified.

## 2023-12-16 NOTE — DISCHARGE SUMMARY
[facility] Surg [address] Medicine  Discharge Summary      Patient Name: Indira Waters  MRN: [id number]  DIONE: [id number]  Patient Class: OP- Observation  Admission Date: 12/15/2023  Hospital Length of Stay: 0 days  Discharge Date and Time:  12/16/2023 4:44 PM  Attending Physician: HEAVENLY Perez MD   Discharging Provider: TALAT Perez MD  Primary Care Provider: Chun Zapata MD    Primary Care Team: Networked reference to record PCT     HPI:   Indira Waters is a 78-year-old female with a past medical history of arthritis, DM, CAD, fibromyalgia, hypertension, hyperlipidemia, past MI, CVA and lupus who presents with intermittent chest pain for the past 2 days.  Patient states pain was relieved with nitroglycerin that she used twice yesterday and once today.  Upon arrival to the ED, patient complaining of chest pain that she describes as aching, pressure and stabbing that occurs at rest and with activity. Patient has a history of fibromyalgia and lupus and states this pain is different than with her past Lupus flare ups. Patient has history of chronic dyspnea and states she is at baseline.  Patient also reports she was notified by her PCP that her WBC was elevated.  WBC was 15.08 on 12/14/2023 and 11.82 on lab work performed today in the ED. Patient denies fever, chills, nausea and vomiting.     EKGs performed in the ED showed normal sinus rhythm with no ST elevation or depression and repeat EKG was unchanged.  Initial troponin 0.019 and CRP 17.6.  Lab work otherwise unremarkable.  COVID and flu were negative.  Chest x-ray showed no acute cardiopulmonary process. Patient received sublingual nitroglycerin in the ED  and nitro paste was applied with relief. Patient is followed by Dr Méndez, cardiology. Patient will be admitted to hospital medicine for further evaluation and management.           * No surgery found *      Hospital Course:   Admitted with chest pain and trended  troponins. Pain resolved and did not recur. Troponins remained stable in normal range. Repeat echocardiography performed showing overall stability from 05/2022 though technically difficult study. With clinical improvement and vital stability, she was prepared for discharge home; recommended follow-up with PCP and cardiology in near future and counseled regarding return precautions.     Goals of Care Treatment Preferences:  Code Status: Full Code    Health care agent: Esha Iglesias  [facility] agent number: [phone]                   Consults:     No new Assessment & Plan notes have been filed under this hospital service since the last note was generated.  Service: Hospital Medicine    Final Active Diagnoses:    Diagnosis Date Noted POA    PRINCIPAL PROBLEM:  Chest pain with high risk for cardiac etiology [R07.9] 06/11/2021 Yes    Recurrent major depressive disorder, in remission [F33.40] 06/20/2022 Yes    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 pectoris [I25.118] 05/04/2022 Yes    Depression, unspecified [F32.A] 11/10/2021 Yes    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 disease [E11.22] 09/15/2021 Yes    Systemic lupus erythematosus, unspecified [M32.9] 09/15/2021 Yes    Chronic diastolic (congestive) heart failure [I50.32] 09/15/2021 Yes    Essential hypertension [I10] 09/10/2021 Yes     Chronic    Type 2 diabetes mellitus with hyperglycemia [E11.65] 04/29/2014 Yes     Chronic    Gastroesophageal reflux disease without esophagitis [K21.9] 04/29/2014 Yes     Chronic    Fibromyalgia [M79.7] 04/29/2014 Yes     Chronic      Problems Resolved During this Admission:       Discharged Condition: good    Disposition: Home or Self Care    Follow Up:   Follow-up Information       Chun Zapata MD Follow up in 2 week(s).    Specialty: Family Medicine  Why: post-hospital follow-up  Contact information:  [address]  [address]  [address]  [phone]               Jose L Méndez MD Follow up in 2 week(s).    Specialties: Cardiovascular Disease, Cardiology, Interventional Cardiology  Why: post-hospital follow-up  Contact information:  [address]  [address]  [facility] [address]  [phone]                           Patient Instructions:      Notify your health care provider if you experience any of the following:  severe uncontrolled pain     Notify your health care provider if you experience any of the following:  increased confusion or weakness     Notify your health care provider if you experience any of the following:  persistent dizziness, light-headedness, or visual disturbances     Notify your health care provider if you experience any of the following:  difficulty breathing or increased cough     Notify your health care provider if you experience any of the following:  persistent nausea and vomiting or diarrhea     Activity as tolerated       Significant Diagnostic Studies:   CBC:  Recent Labs   Lab 12/14/23  1410 12/15/23  1940 12/16/23  0359   WBC 15.08* 11.82 10.76   HGB 10.9* 11.1* 10.4*   HCT 34.7* 34.3* 32.8*    306 289   GRAN  --  57.3  6.8 54.2  5.8   LYMPH  --  26.0  3.1 30.2  3.3   MONO  --  8.5  1.0 7.9  0.9   EOS  --  0.8* 0.7*   BASO  --  0.08 0.07     CMP:  Recent Labs   Lab 12/15/23  1940 12/16/23  0359    133*   K 4.7 4.6   CL 99 99   CO2 22* 23   BUN 19 20   CREATININE 1.2 1.5*   * 502*   CALCIUM 9.2 8.7   ALKPHOS 65 60   AST 28 18   ALT 15 16   BILITOT 0.2 0.2   PROT 7.5 6.7   ALBUMIN 3.6 3.2*   ANIONGAP 15 11       Pending Diagnostic Studies:       None           Medications:  Reconciled Home Medications:      Medication List        CONTINUE taking these medications      acetaminophen 500 MG tablet  Commonly known as: TYLENOL  Take 2 tablets (1,000 mg total) by mouth every 8 (eight) hours as needed for Pain.     allopurinoL 300 MG tablet  Commonly known as: ZYLOPRIM  Take 1 tablet  (300 mg total) by mouth once daily.     aspirin 81 MG EC tablet  Commonly known as: ECOTRIN  Take 1 tablet (81 mg total) by mouth once daily.     blood sugar diagnostic Strp  To check BG 6 times daily, to use with insurance preferred meter     blood-glucose meter kit  To check BG 6 times daily, to use with insurance preferred meter     cloNIDine 0.1 MG tablet  Commonly known as: CATAPRES  Take 1 tablet (0.1 mg total) by mouth 2 (two) times daily.     DEXCOM G7  Misc  Generic drug: blood-glucose meter,continuous  Use as directed.     DEXCOM G7 SENSOR Nicole  Generic drug: blood-glucose sensor  Change every 10 days.     fenofibrate micronized 134 MG Cap  Commonly known as: LOFIBRA  Take 1 capsule (134 mg total) by mouth daily with breakfast.     furosemide 40 MG tablet  Commonly known as: LASIX  TAKE ONE TABLET BY MOUTH EVERY DAY     HYDROcodone-acetaminophen  mg per tablet  Commonly known as: NORCO  Take 1 tablet by mouth every 8 (eight) hours as needed for Pain.     hydrocortisone 2.5 % rectal cream  Commonly known as: ANUSOL-HC  Procto-Med HC 2.5 % topical cream perineal applicator     insulin aspart U-100 100 unit/mL (3 mL) Inpn pen  Commonly known as: NovoLOG Flexpen U-100 Insulin  Inject 14 Units into the skin 3 (three) times daily with meals. + meal correction scale. Max 40 units     ipratropium-albuteroL  mcg/actuation inhaler  Commonly known as: CombiVENT  Inhale 1 puff into the lungs by mouth every 6 (six) hours.     lancets [facility]  To check BG 6 times daily, to use with insurance preferred meter     LEVEMIR FLEXTOUCH U100 INSULIN 100 unit/mL (3 mL) Inpn pen  Generic drug: insulin detemir U-100 (Levemir)  Inject 20 Units into the skin once daily AND 18 Units every evening.     losartan 50 MG tablet  Commonly known as: COZAAR  Take 1 tablet (50 mg total) by mouth once daily.     methocarbamoL 500 MG Tab  Commonly known as: ROBAXIN  Take 1 tablet (500 mg total) by mouth 3 (three) times daily as  "needed (muscle spasm).     metoclopramide HCl 5 MG tablet  Commonly known as: REGLAN  TAKE ONE TABLET BY MOUTH FOUR TIMES DAILY AS NEEDED FOR NAUSEA PREVENTION     metoprolol succinate 100 MG 24 hr tablet  Commonly known as: TOPROL-XL  Take 1 tablet (100 mg total) by mouth once daily.     mupirocin 2 % ointment  Commonly known as: BACTROBAN  Apply topically 3 (three) times daily as needed (skin breakdown).     NIFEdipine 30 MG (OSM) 24 hr tablet  Commonly known as: PROCARDIA-XL  Take 1 tablet (30 mg total) by mouth 2 (two) times a day.     nitroGLYCERIN 0.4 MG SL tablet  Commonly known as: NITROSTAT  Place 1 tablet (0.4 mg total) under the tongue every 5 (five) minutes as needed for Chest pain.     pantoprazole 40 MG tablet  Commonly known as: PROTONIX  Take 1 tablet (40 mg total) by mouth once daily.     pen needle, diabetic 31 gauge x 1/4" Ndle  1 each by Misc.(Non-Drug; Combo Route) route 5 (five) times daily.     pregabalin 150 MG capsule  Commonly known as: LYRICA  TAKE ONE CAPSULE BY MOUTH 3 TIMES DAILY     REPATHA SURECLICK 140 mg/mL Pnij  Generic drug: evolocumab  Inject 1 mL (140 mg total) into the skin every 14 (fourteen) days.     sertraline 100 MG tablet  Commonly known as: ZOLOFT  Take 1 tablet (100 mg total) by mouth once daily.     STOOL SOFTENER-STIMULANT LAXAT 8.6-50 mg per tablet  Generic drug: senna-docusate 8.6-50 mg  Take 1 tablet by mouth 2 (two) times daily as needed for Constipation.     teriparatide 20 mcg/dose (620mcg/2.48mL) Pnij  Inject 20 mcg into the skin once daily.     triamcinolone acetonide 0.1% 0.1 % cream  Commonly known as: KENALOG  Apply to affected areas of body twice daily as needed rash. Do not use on face, underarms, or groin.              Indwelling Lines/Drains at time of discharge:   Lines/Drains/Airways       None                   Time spent on the discharge of patient: 35 minutes         TALAT Perez MD  Department of Hospital Medicine  [facility] ([facility])

## 2023-12-16 NOTE — PLAN OF CARE
12/16/23 0811   CRABTREE Message   Medicare Outpatient and Observation Notification regarding financial responsibility Given to patient/caregiver;Explained to patient/caregiver;Signed/date by patient/caregiver   Date CRABTREE was signed 12/16/23   Time CRABTREE was signed 0802

## 2023-12-18 ENCOUNTER — PATIENT MESSAGE (OUTPATIENT)
Dept: INTERNAL MEDICINE | Facility: CLINIC | Age: 78
End: 2023-12-18
Payer: MEDICARE

## 2023-12-19 LAB
6MAM UR QL: NOT DETECTED
7AMINOCLONAZEPAM UR QL: NOT DETECTED
A-OH ALPRAZ UR QL: NOT DETECTED
ALPHA-OH-MIDAZOLAM: NOT DETECTED
ALPRAZ UR QL: NOT DETECTED
AMPHET UR QL SCN: NOT DETECTED
ANNOTATION COMMENT IMP: NORMAL
BARBITURATES UR QL: NEGATIVE
BUPRENORPHINE UR QL: NOT DETECTED
BZE UR QL: NEGATIVE
CARBOXYTHC UR QL: NEGATIVE
CARISOPRODOL UR QL: NEGATIVE
CLONAZEPAM UR QL: NOT DETECTED
CODEINE UR QL: NOT DETECTED
CREAT UR-MCNC: <20 MG/DL (ref 20–400)
DIAZEPAM UR QL: NOT DETECTED
ETHYL GLUCURONIDE UR QL: NEGATIVE
FENTANYL UR QL: NOT DETECTED
GABAPENTIN: NOT DETECTED
HYDROCODONE UR QL: PRESENT
HYDROMORPHONE UR QL: PRESENT
LORAZEPAM UR QL: NOT DETECTED
MDA UR QL: NOT DETECTED
MDEA UR QL: NOT DETECTED
MDMA UR QL: NOT DETECTED
ME-PHENIDATE UR QL: NOT DETECTED
METHADONE UR QL: NEGATIVE
METHAMPHET UR QL: NOT DETECTED
MIDAZOLAM UR QL SCN: NOT DETECTED
MORPHINE UR QL: NOT DETECTED
NALOXONE: NOT DETECTED
NORBUPRENORPHINE UR QL CFM: NOT DETECTED
NORDIAZEPAM UR QL: NOT DETECTED
NORFENTANYL UR QL: NOT DETECTED
NORHYDROCODONE UR QL CFM: PRESENT
NORMEPERIDINE UR QL CFM: NOT DETECTED
NOROXYCODONE UR QL CFM: NOT DETECTED
NOROXYMORPHONE UR QL SCN: NOT DETECTED
OXAZEPAM UR QL: NOT DETECTED
OXYCODONE UR QL: NOT DETECTED
OXYMORPHONE UR QL: NOT DETECTED
PATHOLOGY STUDY: NORMAL
PCP UR QL: NEGATIVE
PHENTERMINE UR QL: NOT DETECTED
PREGABALIN: PRESENT
SERVICE CMNT-IMP: NORMAL
TAPENTADOL UR QL SCN: NOT DETECTED
TAPENTADOL UR QL SCN: NOT DETECTED
TEMAZEPAM UR QL: NOT DETECTED
TRAMADOL UR QL: NEGATIVE
ZOLPIDEM METABOLITE: NOT DETECTED
ZOLPIDEM UR QL: NOT DETECTED

## 2024-01-02 ENCOUNTER — TELEPHONE (OUTPATIENT)
Dept: INTERNAL MEDICINE | Facility: CLINIC | Age: 79
End: 2024-01-02
Payer: MEDICARE

## 2024-01-02 NOTE — TELEPHONE ENCOUNTER
Spoke to patient daughter and informed her that [facility] have been trying to contact her mom. She said she will call them now.

## 2024-01-10 DIAGNOSIS — G89.4 CHRONIC PAIN DISORDER: ICD-10-CM

## 2024-01-10 DIAGNOSIS — E08.42 DIABETIC POLYNEUROPATHY ASSOCIATED WITH DIABETES MELLITUS DUE TO UNDERLYING CONDITION: ICD-10-CM

## 2024-01-10 DIAGNOSIS — G89.4 CHRONIC PAIN SYNDROME: ICD-10-CM

## 2024-01-11 RX ORDER — HYDROCODONE BITARTRATE AND ACETAMINOPHEN 10; 325 MG/1; MG/1
1 TABLET ORAL EVERY 8 HOURS PRN
Qty: 90 TABLET | Refills: 0 | Status: SHIPPED | OUTPATIENT
Start: 2024-01-11 | End: 2024-02-11 | Stop reason: SDUPTHER

## 2024-01-11 NOTE — TELEPHONE ENCOUNTER
Patient requesting refill on norco  Last office visit 12/14/2023   shows last refill on 12/14/2023  Patient does have a pain contract on file with [facility] department  Patient last UDS 12/14/2023 was consistent with current therapy     CODEINE  Not Detected  Not Detected    Not Detected   Comment: INTERPRETIVE INFORMATION: Codeine, U  Positive Cutoff: 40 ng/mL  Methodology: Mass Spectrometry   MORPHINE  Not Detected  Not Detected    Not Detected   Comment: INTERPRETIVE INFORMATION:Morphine, U  Positive Cutoff: 20 ng/mL  Methodology: Mass Spectrometry   6-ACETYLMORPHINE  Not Detected  Not Detected    Not Detected   Comment: INTERPRETIVE INFORMATION:6-acetylmorphine, U  Positive Cutoff: 20 ng/mL  Methodology: Mass Spectrometry   OXYCODONE  Not Detected  Not Detected    Not Detected   Comment: INTERPRETIVE INFORMATION:Oxycodone, U  Positive Cutoff: 40 ng/mL  Methodology: Mass Spectrometry   NOROYXCODONE  Not Detected  Not Detected    Not Detected   Comment: INTERPRETIVE INFORMATION:Noroxycodone, U  Positive Cutoff: 100 ng/mL  Methodology: Mass Spectrometry   OXYMORPHONE  Not Detected  Not Detected    Not Detected   Comment: INTERPRETIVE INFORMATION:Oxymorphone, U  Positive Cutoff: 40 ng/mL  Methodology: Mass Spectrometry   NOROXYMORPHONE  Not Detected  Not Detected    Not Detected   Comment: INTERPRETIVE INFORMATION:Noroxymorphone, U  Positive Cutoff: 100 ng/mL  Methodology: Mass Spectrometry   HYDROCODONE  Present  Present    Present   Comment: INTERPRETIVE INFORMATION:Hydrocodone, U  Positive Cutoff: 40 ng/mL  Methodology: Mass Spectrometry   NORHYDROCODONE  Present  Present    Present   Comment: INTERPRETIVE INFORMATION:Norhydrocodone, U  Positive Cutoff: 100 ng/mL  Methodology: Mass Spectrometry   HYDROMORPHONE  Present  Present    Present   Comment: INTERPRETIVE INFORMATION:Hydromorphone, U  Positive Cutoff: 20 ng/mL  Methodology: Mass Spectrometry   BUPRENORPHINE  Not Detected  Not  Detected    Not Detected   Comment: INTERPRETIVE INFORMATION:Buprenorphine, U  Positive Cutoff: 5 ng/mL  Methodology: Mass Spectrometry   NORUBPRENORPHINE  Not Detected  Not Detected    Not Detected   Comment: INTERPRETIVE INFORMATION:Norbuprenorphine, U  Positive Cutoff: 20 ng/mL  Methodology: Mass Spectrometry   FENTANYL  Not Detected  Not Detected    Not Detected   Comment: INTERPRETIVE INFORMATION:Fentanyl, U  Positive Cutoff: 2 ng/mL  Methodology: Mass Spectrometry   NORFENTANYL  Not Detected  Not Detected    Not Detected   Comment: INTERPRETIVE INFORMATION:Norfentanyl, U  Positive Cutoff: 2 ng/mL  Methodology: Mass Spectrometry   MEPERIDINE METABOLITE  Not Detected  Not Detected    Not Detected   Comment: INTERPRETIVE INFORMATION:Meperidine metabolite, U  Positive Cutoff: 50 ng/mL  Methodology: Mass Spectrometry   TAPENTADOL  Not Detected  Not Detected    Not Detected   Comment: INTERPRETIVE INFORMATION:Tapentadol, U  Positive Cutoff: 100 ng/mL  Methodology: Mass Spectrometry   TAPENTADOL-O-SULF  Not Detected  Not Detected    Not Detected   Comment: INTERPRETIVE INFORMATION:Tapentadol-o-Sulf, U  Positive Cutoff: 200 ng/mL  Methodology: Mass Spectrometry   METHADONE  Negative  Not Detected    Not Detected   Comment: Presumptive negative by immunoassay. Testing by mass  spectrometry is available on request.  INTERPRETIVE INFORMATION: Methadone Screen, U  Positive Cutoff: 150 ng/mL  Methodology: Immunoassay   TRAMADOL  Negative  Not Detected    Not Detected   Comment: Presumptive negative by immunoassay. Testing by mass  spectrometry is available on request.  INTERPRETIVE INFORMATION:Tramadol Screen, U  Positive Cutoff: 100 ng/mL  Methodology: Immunoassay   AMPHETAMINE  Not Detected  Not Detected    Not Detected   Comment: INTERPRETIVE INFORMATION:Amphetamine, U  Positive Cutoff: 50 ng/mL  Methodology: Mass Spectrometry   METHAMPHETAMINE  Not Detected  Not Detected    Not Detected   Comment: INTERPRETIVE  INFORMATION:Methamphetamine, U  Positive Cutoff: 200 ng/mL  Methodology: Mass Spectrometry   MDMA- ECSTASY  Not Detected  Not Detected    Not Detected   Comment: INTERPRETIVE INFORMATION:MDMA, U  Positive Cutoff: 200 ng/mL  Methodology: Mass Spectrometry   MDA  Not Detected  Not Detected    Not Detected   Comment: INTERPRETIVE INFORMATION:MDA, U  Positive Cutoff: 200 ng/mL  Methodology: Mass Spectrometry   MDEA- Mary  Not Detected  Not Detected    Not Detected   Comment: INTERPRETIVE INFORMATION:MDEA, U  Positive Cutoff: 200 ng/mL  Methodology: Mass Spectrometry   METHYLPHENIDATE  Not Detected  Not Detected    Not Detected   Comment: INTERPRETIVE INFORMATION:Methylphenidate, U  Positive Cutoff: 100 ng/mL  Methodology: Mass Spectrometry   PHENTERMINE  Not Detected  Not Detected    Not Detected   Comment: INTERPRETIVE INFORMATION:Phentermine, U  Positive Cutoff: 100 ng/mL  Methodology: Mass Spectrometry   BENZOYLECGONINE  Negative  Not Detected    Not Detected   Comment: Presumptive negative by immunoassay. Testing by mass  spectrometry is available on request.  INTERPRETIVE INFORMATION:Cocaine Screen, U  Positive Cutoff: 150 ng/mL  Methodology: Immunoassay   ALPRAZOLAM  Not Detected  Not Detected    Not Detected   Comment: INTERPRETIVE INFORMATION:Alprazolam, U  Positive Cutoff: 40 ng/mL  Methodology: Mass Spectrometry   ALPHA-OH-ALPRAZOLAM  Not Detected  Not Detected    Not Detected   Comment: INTERPRETIVE INFORMATION:Alpha-OH-Alprazolam, U  Positive Cutoff: 20 ng/mL  Methodology: Mass Spectrometry   CLONAZEPAM  Not Detected  Not Detected    Not Detected   Comment: INTERPRETIVE INFORMATION:Clonazepam, U  Positive Cutoff: 20 ng/mL  Methodology: Mass Spectrometry   7-AMINOCLONAZEPAM  Not Detected  Not Detected    Not Detected   Comment: INTERPRETIVE INFORMATION:7-Aminoclonazepam, U  Positive Cutoff: 40 ng/mL  Methodology: Mass Spectrometry   DIAZEPAM  Not Detected  Not Detected    Not Detected   Comment: INTERPRETIVE  INFORMATION:Diazepam, U  Positive Cutoff: 50 ng/mL  Methodology: Mass Spectrometry   NORDIAZEPAM  Not Detected  Not Detected    Not Detected   Comment: INTERPRETIVE INFORMATION:Nordiazepam, U  Positive Cutoff: 50 ng/mL  Methodology: Mass Spectrometry   OXAZEPAM  Not Detected  Not Detected    Not Detected   Comment: INTERPRETIVE INFORMATION:Oxazepam, U  Positive Cutoff: 50 ng/mL  Methodology: Mass Spectrometry   TEMAZEPAM  Not Detected  Not Detected    Not Detected   Comment: INTERPRETIVE INFORMATION:Temazepam, U  Positive Cutoff: 50 ng/mL  Methodology: Mass Spectrometry   Lorazepam  Not Detected  Not Detected    Not Detected   Comment: INTERPRETIVE INFORMATION:Lorazepam, U  Positive Cutoff: 60 ng/mL  Methodology: Mass Spectrometry   MIDAZOLAM  Not Detected  Not Detected    Not Detected   Comment: INTERPRETIVE INFORMATION:Midazolam, U  Positive Cutoff: 20 ng/mL  Methodology: Mass Spectrometry   ZOLPIDEM  Not Detected  Not Detected    Not Detected   Comment: INTERPRETIVE INFORMATION:Zolpidem, U  Positive Cutoff: 20 ng/mL  Methodology: Mass Spectrometry   BARBITURATES  Negative  Not Detected    Not Detected   Comment: Presumptive negative by immunoassay. Testing by mass  spectrometry is available on request.  INTERPRETIVE INFORMATION:Barbiturates Screen, U  Positive Cutoff: 200 ng/mL  Methodology: Immunoassay   Creatinine, Urine 20.0 - 400.0 mg/dL <20.0 24.0 R 21.1 32.4 R 22.8 R 89.0 R 62.7   Comment: Creatinine <20 mg/dL is consistent with a dilute urine  specimen. Recollection to obtain a more concentrated  specimen, such as a first morning void, may be considered  if unexpected negative urine drug screening results were  obtained.   ETHYL GLUCURONIDE  Negative  Not Detected    Not Detected   Comment: Presumptive negative by immunoassay. Testing by mass  spectrometry is available on request.  INTERPRETIVE INFORMATION:Ethyl Glucuronide Screen, U  Positive Cutoff: 500 ng/mL  Methodology: Immunoassay   MARIJUANA  METABOLITE  Negative  Not Detected CM    Not Detected   Comment: Presumptive negative by immunoassay. Testing by mass  spectrometry is available on request.  INTERPRETIVE INFORMATION: THC (Cannabinoids) Screen, U  Positive Cutoff: 50 ng/mL  Methodology: Immunoassay   PCP  Negative  Not Detected    Not Detected   Comment: Presumptive negative by immunoassay. Testing by mass  spectrometry is available on request.  INTERPRETIVE INFORMATION:Phencyclidine Screen, U  Positive Cutoff: 25 ng/mL  Methodology: Immunoassay   CARISOPRODOL  Negative  Not Detected CM    Not Detected CM   Comment: Presumptive negative by immunoassay. Testing by mass  spectrometry is available on request.  INTERPRETIVE INFORMATION: Carisoprodol Screen, U  Positive Cutoff: 100 ng/mL  Methodology: Immunoassay  The carisoprodol immunoassay has cross-reactivity to  carisoprodol and meprobamate.   Naloxone  Not Detected  Not Detected    Not Detected   Comment: INTERPRETIVE INFORMATION:Naloxone, U  Positive Cutoff: 100 ng/mL  Methodology: Mass Spectrometry   Gabapentin  Not Detected  Not Detected    Not Detected   Comment: INTERPRETIVE INFORMATION:Gabapentin, U  Positive Cutoff: 3,000 ng/mL  Methodology: Mass Spectrometry   Pregabalin  Present  Present    Present   Comment: INTERPRETIVE INFORMATION:Pregabalin, U  Positive Cutoff: 3,000 ng/mL  Methodology: Mass Spectrometry   Alpha-OH-Midazolam  Not Detected  Not Detected    Not Detected   Comment: INTERPRETIVE INFORMATION:Alpha-OH-Midazolam, U  Positive Cutoff: 20 ng/mL  Methodology: Mass Spectrometry   Zolpidem Metabolite  Not Detected  Not Detected    Not Detected   Comment: INTERPRETIVE INFORMATION:Zolpidem Metabolite, U  Positive Cutoff: 100 ng/mL  Methodology: Mass Spectrometry   PAIN MANAGEMENT DRUG PANEL  See Below  See Below CM    See Below CM

## 2024-01-20 DIAGNOSIS — E79.0 HYPERURICEMIA: ICD-10-CM

## 2024-01-20 NOTE — TELEPHONE ENCOUNTER
Care Due:                  Date            Visit Type   Department     Provider  --------------------------------------------------------------------------------                                MYCHART                              FOLLOWUP/OF  [facility] INTERNAL  Last Visit: 10-      FICE VISIT   MEDICINE       Chun Zapata  Next Visit: None Scheduled  None         None Found                                                            Last  Test          Frequency    Reason                     Performed    Due Date  --------------------------------------------------------------------------------    Uric Acid...  12 months..  allopurinoL..............  12- 11-    [facility] Embedded Care Due Messages. Reference number: [id number].   1/20/2024 8:04:29 AM CST

## 2024-01-22 RX ORDER — ALLOPURINOL 300 MG/1
300 TABLET ORAL
Qty: 90 TABLET | Refills: 1 | Status: SHIPPED | OUTPATIENT
Start: 2024-01-22

## 2024-01-26 ENCOUNTER — OFFICE VISIT (OUTPATIENT)
Dept: PAIN MEDICINE | Facility: CLINIC | Age: 79
End: 2024-01-26
Payer: MEDICARE

## 2024-01-26 ENCOUNTER — TELEPHONE (OUTPATIENT)
Dept: PAIN MEDICINE | Facility: CLINIC | Age: 79
End: 2024-01-26
Payer: MEDICARE

## 2024-01-26 VITALS
OXYGEN SATURATION: 100 % | HEART RATE: 75 BPM | SYSTOLIC BLOOD PRESSURE: 149 MMHG | WEIGHT: 198.44 LBS | BODY MASS INDEX: 37.47 KG/M2 | HEIGHT: 61 IN | TEMPERATURE: 98 F | DIASTOLIC BLOOD PRESSURE: 76 MMHG | RESPIRATION RATE: 20 BRPM

## 2024-01-26 DIAGNOSIS — E11.40 PAINFUL DIABETIC NEUROPATHY: Primary | ICD-10-CM

## 2024-01-26 PROCEDURE — 99999PBSHW PR PBB SHADOW TECHNICAL ONLY FILED TO HB: Mod: JZ,PBBFAC,,

## 2024-01-26 PROCEDURE — 99499 UNLISTED E&M SERVICE: CPT | Mod: S$PBB,,, | Performed by: NURSE PRACTITIONER

## 2024-01-26 PROCEDURE — 64999 UNLISTED PX NERVOUS SYSTEM: CPT | Mod: PBBFAC | Performed by: NURSE PRACTITIONER

## 2024-01-26 PROCEDURE — 64999 UNLISTED PX NERVOUS SYSTEM: CPT | Mod: S$PBB,,, | Performed by: NURSE PRACTITIONER

## 2024-01-26 PROCEDURE — 99215 OFFICE O/P EST HI 40 MIN: CPT | Mod: PBBFAC | Performed by: NURSE PRACTITIONER

## 2024-01-26 PROCEDURE — 99999 PR PBB SHADOW E&M-EST. PATIENT-LVL V: CPT | Mod: PBBFAC,,, | Performed by: NURSE PRACTITIONER

## 2024-01-26 RX ADMIN — CAPSAICIN 4 PATCH: KIT at 03:01

## 2024-01-26 NOTE — PROGRESS NOTES
Chronic patient Established Note (Follow up visit)        Interval History 1/26/2024:  The patient returns to clinic today for foot pain. She continues to report neuropathic pain into her feet. She describes this as burning in nature. She does find benefit with Qutenza patches. She denies any rash or increased pain after the patch application. She continues to take Lyrica, Robaxin, and Norco with benefit. She denies any adverse effects. She denies any other health changes. Her pain today is 8/10.    Interval History 12/14/2023:  The patient returns to clinic today for follow up of back and leg pain. She continues to report low back pain that radiates into both legs. She continues to report bilateral neuropathic pain into the feet. She states that today is a bad day. She is having more left foot and toe pain. Her pain is worse with prolonged standing and walking. She does have benefit with Qutenza patches. She is taking Lyrica, Robaxin, and Norco with benefit and without adverse effects. She denies other health changes. Her pain today is 8/10.    Interval History 10/11/2023:  The patient returns to clinic today for follow up of bilateral foot pain. She continues to report neuropathic pain to her feet. Her pain is worse with standing and walking. She does have benefit with Qutenza patches. She also takes Lyrica, Robaxin, and Norco. She denies any adverse effects. She denies any other health changes. Her pain today is 8/10.    Interval History 9/15/2023:  The patient returns to clinic today for follow up of back and leg pain. She did have benefit with Qutenza patches. She continues to report low back pain with intermittent radicular leg pain. She continues to report neuropathic pain into her feet. She continues to take Lyrica and Robaxin with benefit. She also takes Norco as needed with benefit. She denies any adverse effects. She denies any other health changes. Her pain today is 9/10.    Interval History  6/27/2023:  The patient returns to clinic today for follow up of pain. She continues to report nerve pain to her feet bilaterally. This pain is worse with standing and walking. She is taking Lyrica and Robaxin. She also takes Norco as needed with benefit and without adverse effects. She denies any other health changes. Her pain today is 9/10.    Interval History 6/16/2023:  The patient returns to clinic today for follow up of back and leg pain. She continues to report low back pain. She also reports diabetic neuropathy to her bilateral feet. Her pain is worse with standing and walking. She has tried Qutenza patches with benefit in the past. She continues to take Robaxin and Lyrica. She also takes Norco as needed with benefit and without adverse effects. She denies any other health changes. Her pain today is 10/10.    Interval History 3/17/2023:  Mrs Waters presents for follow up of chronic, continuous neuropathic pain to [facility]. She is s/p Nevro SCS trial and unfortunately she did not get the relief she was hoping for and 50% at best relief but this was not consistent. She has no constitutional s/s concerning for infection and denies newer neurological changes since trial. She continues with medication mgt and states Qutenza application has been providing significant benefit.     Interval History 2/10/2023:  Mrs Waters presents for follow up of chronic lower back painn and radicular pain in conjunction with PDN to bilateral feet. She is doing fair with medication mgt of Norco, Robaxin, and Lyrica and does need refill at this time. She denies SE of medications. She is also here for Qutenza application today. She is scheduled to have upcoming SCS trial with [facility] to samantha address her PDN.     Interval History 12/13/2022:  Mrs Waters presents for follow up of chronic pain. She is ready to repeat Qutenza application as it has been >91 days and she had significant improvement of symptoms of PDN. She recently had repeat A1C and  just above 10.0 but is decreasing. She continues to take medication with benefit and denies SE of medication. Medication regimen include Norco 10/325mg TID, Robaxin 500mg and Lyrica 150mg.     Interval History 10/12/2022:  Mrs Waters presents for follow up of chronic neuropathy. She is s/p qutenza patch placement with improved functioning and neuropathy control. Unfortunately her A1C was above 11 which inhibits her from Nevro SCS trial at this time. She is eager to have SCS trial. She denies new areas of pain or neurological changes. She continued to take  Norco 10/325mg TID, Robaxin 500mg and Lyrica 150mg TID with benefit and denies significant SE of medications.     Interval History 9/8/2022:  Mrs Waters presents for follow up of chronic lower back painn and radicular pain in conjunction with PDN to bilateral feet. She is doing fair with medication mgt of Norco, Robaxin, and Lyrica and does need refill at this time. She denies SE of medications. She is patiently awaiting her A1C to become lower than 10 to proceed with SCS trial.     Interval History 8/12/2022:  Mrs Waters presents for delayed FU. She has had continuous pain to lumbar and radicular pain but also peripheral neuropathy complaints. Over interval she has had CVA but doing fair. Her A1C has unfortunately elevated above 10 at this time and SCS trial placed on hold but phychiatric evaluation complete. She continues to take Norco 10/325mg TID, Robaxin 500mg TID and Lyrica 150mg TID with some benefit and denies SE of medications. No s/s concerning for cauda equina.     Interval History 4/12/2022:  Patient returns to clinic today for follow-up. Her neuropathic pain continues to be an issue for her, but she says that she is able to manage. She is taking Norco 10-325mg TID, Robaxin 500mg TID, and Lyrica 150mg TID without any adverse side effects. She denies any changes in her symptoms. She would like to proceed with SCS trial. Discussed last time that her HbA1c  should be <10, was 9.8 on most recent labs. Seen by psychology and cleared for SCS.     Interval History 2/14/2022:  Mrs Waters presents for follow up. Pt states overall neuropathy continues. Since last visit she has been stable with medication mgt and takign Norco 10/325mg TID, Robaxin 500mg TID and Lyrica 150mg TID. She denies new areas of pain or neurological changes. Medication adequate to control pain without adverse SE.      Interval History 12/21/2021:  Pt presents for urgent evaluation s/p fall in kitchen last night. Pt unsure of LOC or head trauma but states some amnesia of events. Pt is having left knee pain, B ankle pain L>R and lower back/buttock pain. Daughter further states tremor like activity last night. During visit daughter asked for bedside commode due to inability to ambulate.  Pt during visit appeared somnolent, pale and began vomiting. Discussed going to ER for further evaluation.      Interval History 12/14/2021:  Mrs Waters presents for follow up of chronic pain complaints. She is hopeful she will have A1C lower soon to move forward with SCS. She is doing fair with medication mgt alone at this time and denies SE of medications. Pt is currently taking Norco 10/325mg TID PRN, Lyrica 150mg TID, and Robaxin 500mg TID. She denies new areas of pain or neurological changes.      Interval History 10/14/2021:  The Pt presents for follow up and s/p B Lumbar sympathetic blocks. Pt states this has done well but ready to proceed with SCS trial. She is aware A1C must be under tight control and Pt and daughter re-iterate knowing this. Pt also mentions DKA admission and diagnosis of vasculitis as well over interval. Pt continues to take hydrocodone 10/325 mg TID PRN, Lyrica 150 mg TID, and Robaxin 500mg TID. She denies any SE of medication, denies new neurological changes.      Interval Hx 09/16/2021  Indira Waters presents to the clinic for a follow-up appointment for f/u after bilateral lumbar  sympathetic block on 8/25/21.Patient reports >50% relief after lumbar sympathetic block that lasted 2 weeks when she then developed a UTI/DKA, was admitted to the hospital and pain recurred.  Current pain intensity is 8/10.     Interval History 8/12/2021:  Indira Waters presents to the clinic for a follow-up appointment for BLE diabetic neuropathy, painful. Continues with Norco 10/325mg, Lyrica 150mg TID, Robaxin 750mg daily PRN. She had to cancel previously scheduled lumbar sympathetic block 2/2 lithotripsy for painful kidney stones, which have now passed. She still has intermittent pain with urination but overall that pain is improving. She had to cancel previous angiogram for this same reason - this has not been rescheduled yet. She denies any change in her LE pain. Denies any new neurological sxs in BLEs.     Interval History 6/10/21:  Indira Waters presents to the clinic for a 2 week follow-up appointment from lumbar sympathetic nerve block in early May. She reports minimal relief from this intervention, but did note that it helped some, briefly. Since the last visit, Indira Waters states the pain has been persistant. Current pain intensity is 10/10. Patient has chronic generalized diffuse pain, most pronounced in her BL lower extremities 2/2 diabetic neuropathy. HSe states that the pain is a little better than at her last visit. Most days are 10/10, but some days are better than others. Her pain is aggravated by exertion, walking, or sitting/standing for extended periods of time. He pain is mildly improved by rest and medications. She is currently taking Lyrica 150 PO TID, Zoloft, and Norco 10-325mg TID PRN. She states that the pain meds are helping but she is still constantly in pain and unable to participate in her ADLs. She has now established care with PCP for assistance w/ poorly controlled T2DM, last A1c 11.4. She has not yet established care w/ Rheumatology for fibromyalgia  "management.    Interval HPI 4/15/21:  Patient returns for follow up of chronic generalized diffuse pain. At the last visit, had EMG (not yet completed), lumbar and hip x-ray imaging ordered. She was also referred to Rheumatology for fibromyalgia management and referral to PCP for DM2 management and weaning of zoloft for transition to cymbalta for treatment of neuropathy. She had her Lyrica increased to 150mg PO TID, and prescribed Norco 10mg TID with opioid contract signed. She has been taking Norco 10mg TID and it has been helping her "bad" pains but does not take all of her pain away. She has not yet been set up with her new PCP or rheumatologist yet for fibromyalgia. Still continues to have generalized pain everywhere including feet, hips, legs, lower and upper back, neck and shoulder pain. Continues to have neuropathy in the bilateral lower extremities due to uncontrolled DM2.    Initial Visit 3/11/21:  Indira Waters presents to the clinic for the evaluation of chronic pain. Complaining of pain everywhere including feet, legs, hips, lower and upper back, neck, shoulder. Pain started 5+ years ago. Pain 10/10 at worse. She was referred here by her PCP Dr. Ramos who she has been seeing for over 6 years. She states her pain is aggravated by any physical activity/movement. She takes lyrica, robaxin, and Norco 10 TID with mild relief of pain. Per patient, she was referred here by her PCP for medication refill. She has not tried physical therapy for several years, she states it did not help last time she was in PT. She says she is trying to exercise daily by doing yoga. She walks with a walker. She has numerous comorbidities including DM2 (Last A1C 9+ per prior family medicine note in Jan 2021), fibromyalgia, lupus, DDD. She states she would like to find a new PCP as she no longer lives near her old one. Patient denies night fever/night sweats, urinary incontinence, bowel incontinence and significant weight " loss.      Pain Disability Index Review:      1/26/2024     2:50 PM 12/14/2023     2:58 PM 10/11/2023     2:34 PM   Last 3 PDI Scores   Pain Disability Index (PDI) 10 56 40     Pain Medications:  Norco 10-325mg TID, Robaxin 500mg TID, and Lyrica 150mg TID    Opioid Contract: yes     report:  Reviewed and consistent with medication use as prescribed.    Pain Procedures:    - reports possible back injection 10+ years ago  - Lumbar sympathetic nerve block 05/05/21 - Dr. Pereira - minimal relief    Physical Therapy/Home Exercise: no     Imaging:   Hip X-ray 4/7/21  FINDINGS:  Osseous structures appear intact without evidence of fracture or osseous destructive process.  No apparent dislocation.     Modest degenerative change or significant joint space narrowing.     Lumbar X-ray 4/7/21  FINDINGS:  Diffuse bony demineralization.  Vertebral bodies are normal in height without evidence of fracture.  No pars defects.     Normal sagittal alignment is preserved.  No spondylolisthesis. No abnormal translation with flexion and extension.     Intervertebral disc heights are well maintained.  Mild facet arthropathy in the lower lumbar spine.     Mild scattered vascular calcification.     Impression:     No evidence of fracture or malalignment.     Mild facet arthropathy in the lower lumbar spine.     Diffuse bony demineralization.  Consider correlation with DEXA.    Allergies:   Review of patient's allergies indicates:   Allergen Reactions    Bleach (sodium hypochlorite) Shortness Of Breath    Nitrofurantoin macrocrystalline Anaphylaxis    Lipitor [atorvastatin] Diarrhea and Rash    Nsaids (non-steroidal anti-inflammatory drug)      Tolerates aspirin      Pcn [penicillins]     Toradol [ketorolac]        Current Medications:   Current Outpatient Medications   Medication Sig Dispense Refill    acetaminophen (TYLENOL) 500 MG tablet Take 2 tablets (1,000 mg total) by mouth every 8 (eight) hours as needed for Pain.  0     allopurinoL (ZYLOPRIM) 300 MG tablet TAKE ONE TABLET BY MOUTH EVERY DAY 90 tablet 1    aspirin (ECOTRIN) 81 MG EC tablet Take 1 tablet (81 mg total) by mouth once daily. 30 tablet 0    blood sugar diagnostic Strp To check BG 6 times daily, to use with insurance preferred meter 200 each 11    blood-glucose meter kit To check BG 6 times daily, to use with insurance preferred meter 1 each 0    blood-glucose meter,continuous (DEXCOM G7 ) Misc Use as directed. 1 each 0    blood-glucose sensor (DEXCOM G7 SENSOR) Nicole Change every 10 days. 3 each 11    cloNIDine (CATAPRES) 0.1 MG tablet Take 1 tablet (0.1 mg total) by mouth 2 (two) times daily. 180 tablet 0    evolocumab (REPATHA SURECLICK) 140 mg/mL PnIj Inject 1 mL (140 mg total) into the skin every 14 (fourteen) days. 8 each 0    fenofibrate micronized (LOFIBRA) 134 MG Cap Take 1 capsule (134 mg total) by mouth daily with breakfast. 90 capsule 3    furosemide (LASIX) 40 MG tablet TAKE ONE TABLET BY MOUTH EVERY DAY 90 tablet 1    HYDROcodone-acetaminophen (NORCO)  mg per tablet Take 1 tablet by mouth every 8 (eight) hours as needed for Pain. 90 tablet 0    hydrocortisone (ANUSOL-HC) 2.5 % rectal cream Procto-Med HC 2.5 % topical cream perineal applicator      insulin aspart U-100 (NOVOLOG FLEXPEN U-100 INSULIN) 100 unit/mL (3 mL) InPn pen Inject 14 Units into the skin 3 (three) times daily with meals. + meal correction scale. Max 40 units 30 mL 3    insulin detemir U-100, Levemir, (LEVEMIR FLEXTOUCH U100 INSULIN) 100 unit/mL (3 mL) InPn pen Inject 20 Units into the skin once daily AND 18 Units every evening. 30 mL 3    ipratropium-albuteroL (COMBIVENT)  mcg/actuation inhaler Inhale 1 puff into the lungs by mouth every 6 (six) hours. 4 g 0    lancets Misc To check BG 6 times daily, to use with insurance preferred meter 200 each 11    methocarbamoL (ROBAXIN) 500 MG Tab Take 1 tablet (500 mg total) by mouth 3 (three) times daily as needed (muscle spasm).  "90 tablet 2    metoclopramide HCl (REGLAN) 5 MG tablet TAKE ONE TABLET BY MOUTH FOUR TIMES DAILY AS NEEDED FOR NAUSEA PREVENTION 30 tablet 3    metoprolol succinate (TOPROL-XL) 100 MG 24 hr tablet Take 1 tablet (100 mg total) by mouth once daily. 90 tablet 3    mupirocin (BACTROBAN) 2 % ointment Apply topically 3 (three) times daily as needed (skin breakdown). 22 g 3    nitroGLYCERIN (NITROSTAT) 0.4 MG SL tablet Place 1 tablet (0.4 mg total) under the tongue every 5 (five) minutes as needed for Chest pain. 25 tablet 11    pantoprazole (PROTONIX) 40 MG tablet Take 1 tablet (40 mg total) by mouth once daily. 30 tablet 0    pen needle, diabetic 31 gauge x 1/4" Ndle 1 each by Misc.(Non-Drug; Combo Route) route 5 (five) times daily. 500 each 3    pregabalin (LYRICA) 150 MG capsule TAKE ONE CAPSULE BY MOUTH 3 TIMES DAILY 90 capsule 2    senna-docusate 8.6-50 mg (PERICOLACE) 8.6-50 mg per tablet Take 1 tablet by mouth 2 (two) times daily as needed for Constipation. 60 tablet 0    sertraline (ZOLOFT) 100 MG tablet Take 1 tablet (100 mg total) by mouth once daily. 90 tablet 3    teriparatide 20 mcg/dose (620mcg/2.48mL) PnIj Inject 20 mcg into the skin once daily. 2.48 mL 5    triamcinolone acetonide 0.1% (KENALOG) 0.1 % cream Apply to affected areas of body twice daily as needed rash. Do not use on face, underarms, or groin. 454 g 0    losartan (COZAAR) 50 MG tablet Take 1 tablet (50 mg total) by mouth once daily. 90 tablet 0    NIFEdipine (PROCARDIA-XL) 30 MG (OSM) 24 hr tablet Take 1 tablet (30 mg total) by mouth 2 (two) times a day. 180 tablet 0     No current facility-administered medications for this visit.       REVIEW OF SYSTEMS:    GENERAL:  No weight loss, malaise or fevers.  HEENT:  Negative for frequent or significant headaches.  NECK:  Negative for lumps, goiter, pain and significant neck swelling.  RESPIRATORY:  Negative for cough, wheezing or shortness of breath.  CARDIOVASCULAR:  Negative for chest pain, leg " swelling or palpitations. HTN  GI:  Negative for abdominal discomfort, blood in stools or black stools or change in bowel habits.  MUSCULOSKELETAL:  See HPI.  SKIN:  Negative for lesions, rash, and itching.  ENDO: Diabetes  PSYCH:  Negative for sleep disturbance, mood disorder and recent psychosocial stressors.  HEMATOLOGY/LYMPHOLOGY:  Negative for prolonged bleeding, bruising easily or swollen nodes.  NEURO:   No history of headaches, syncope, paralysis, seizures or tremors.  All other reviewed and negative other than HPI.    Past Medical History:  Past Medical History:   Diagnosis Date    Arthritis     Back pain     Cancer     ovarian    Cervical cancer     Coronary artery disease     Depression     Diabetes mellitus     Fibromyalgia     Heart attack     History of MI (myocardial infarction)     Hyperlipidemia     Hypertension     Lupus     Stroke     slight left sided weakness       Past Surgical History:  Past Surgical History:   Procedure Laterality Date    APPENDECTOMY       SECTION      2    CORONARY ANGIOGRAPHY N/A 2022    Procedure: ANGIOGRAM, CORONARY ARTERY;  Surgeon: Jose L Méndez MD;  Location: [facility] CATH LAB;  Service: Cardiology;  Laterality: N/A;    HYSTERECTOMY      with USO for cervical cancer    INJECTION OF ANESTHETIC AGENT AROUND NERVE Bilateral 2021    Procedure: BLOCK, NERVE, SYMPATHIC;  Surgeon: Holden Pereira MD;  Location: [facility] PAIN MGT;  Service: Pain Management;  Laterality: Bilateral;    INJECTION OF ANESTHETIC AGENT AROUND NERVE N/A 2021    Procedure: BLOCK, NERVE, SYMPATHETIC  need consent;  Surgeon: Holden Pereira MD;  Location: [facility] PAIN MGT;  Service: Pain Management;  Laterality: N/A;    NM EVAL,SWALLOW FUNCTION,CINE/VIDEO RECORD  2021         TONSILLECTOMY      TRIAL OF SPINAL CORD NERVE STIMULATOR N/A 3/8/2023    Procedure: LUMBAR SPINAL CORD STIMULATOR TRIAL NEVRO REP PATIENT STATES SHE NO LONGER TAKES PLAVIX;  Surgeon: Holden Pereira,  MD;  Location: [facility] PAIN MGT;  Service: Pain Management;  Laterality: N/A;       Family History:  Family History   Problem Relation Age of Onset    COPD Mother     Lupus Mother     Hernia Mother     Uterine cancer Mother         vs cervical cancer    Ovarian cancer Mother     Diabetes Father     Coronary artery disease Father     Colon cancer Maternal Grandmother         in her 50's       Social History:  Social History     Socioeconomic History    Marital status:    Tobacco Use    Smoking status: Former     Current packs/day: 0.00     Types: Cigarettes     Quit date: 11/2020     Years since quitting: 3.2     Passive exposure: Past    Smokeless tobacco: Never   Substance and Sexual Activity    Alcohol use: Not Currently     Comment: occasionally    Drug use: Yes     Types: Hydrocodone     Comment: three times a day    Sexual activity: Not Currently   Social History Narrative    Lives with daughter. .      Social Determinants of Health     Financial Resource Strain: Low Risk  (12/16/2023)    Overall Financial Resource Strain (CARDIA)     Difficulty of Paying Living Expenses: Not hard at all   Food Insecurity: No Food Insecurity (12/16/2023)    Hunger Vital Sign     Worried About Running Out of Food in the Last Year: Never true     Ran Out of Food in the Last Year: Never true   Transportation Needs: No Transportation Needs (12/16/2023)    PRAPARE - Transportation     Lack of Transportation (Medical): No     Lack of Transportation (Non-Medical): No   Physical Activity: Insufficiently Active (12/16/2023)    Exercise Vital Sign     Days of Exercise per Week: 2 days     Minutes of Exercise per Session: 30 min   Stress: No Stress Concern Present (12/16/2023)    [employer] - Occupational Stress Questionnaire     Feeling of Stress : Not at all   Social Connections: Socially Isolated (12/16/2023)    Social Connection and Isolation Panel [NHANES]     Frequency of Communication with  "Friends and Family: Never     Frequency of Social Gatherings with Friends and Family: Never     Attends Alevism Services: Never     Active Member of Clubs or Organizations: No     Attends Club or Organization Meetings: 1 to 4 times per year     Marital Status:    Housing Stability: Unknown (12/16/2023)    Housing Stability Vital Sign     Unable to Pay for Housing in the Last Year: No     Unstable Housing in the Last Year: No       OBJECTIVE:    BP (!) 149/76   Pulse 75   Temp 98.2 °F (36.8 °C)   Resp 20   Ht 5' 1" (1.549 m)   Wt 90 kg (198 lb 6.6 oz)   SpO2 100%   BMI 37.49 kg/m²     PHYSICAL EXAMINATION:     General appearance: Well appearing, in no acute distress, alert and oriented x3.  Psych:  Mood and affect appropriate.  Skin: Skin color, texture, turgor normal, no rashes or lesions, in both upper and lower body.  Head/face:  Atraumatic, normocephalic.  Pulm: Symmetric chest rise, no respiratory distress noted.   Back: Straight leg raise in the sitting position is negative for radicular pain.   Musculoskeletal: 5/5 strength in right ankle with plantar and dorsiflexion. 5/5 strength in left ankle with plantar and dorsiflexion. 4/5 strength with right knee flexion and extension. 4/5 strength with left knee flexion and extension.   Neuro:  Decreased sensation to light touch to BLE.   Gait: Antalgic- ambulates with wheelchair.     ASSESSMENT: 78 y.o. year old female with chronic pain, consistent with:     1. Painful diabetic neuropathy  capsaicin-skin cleanser patch 1 patch    capsaicin-skin cleanser patch 4 patch            PLAN:     - Previous imaging reviewed. Labs reviewed.     - Qutenza patch as per below.     - Continue Robaxin 500 mg TID PRN.    - Continue Lyrica 150 mg TID.     - Continue Norco 10/325 mg TID PRN, #90, refill provided.      - The patient is here today for a refill of current pain medications and they believe these provide effective pain control and improvements in quality of " life.  The patient notes no serious side effects, and feels the benefits outweigh the risks.  The patient was reminded of the pain contract that they signed previously as well as the risks and benefits of the medication including possible death.  The updated [place] [employer] prescription monitoring program was reviewed, and the patient has been found to be compliant with current treatment plan.     - UDS from 12/14/2023 reviewed and consistent.      - RTC in 2 months, next visit with Dr. Pereira for annual med visit.      The above plan and management options were discussed at length with patient. Patient is in agreement with the above and verbalized understanding.      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  The patient's/responsible party's questions were answered.  The patient/responsible party appeared to understand and chose to proceed.       Procedural Pause:  A procedural pause verifying correct patient, medical record number, allergies, medications to be administered, current vital signs, and proper application site was performed immediately prior to beginning the procedure.     Procedure in Detail:  The patient was placed in a supine position. 4 patches were used for the procedure today.  The patches were applied to the target area and secured with tape.  A coban was used to further secure the patches in place.  The patient was allowed to rest in a comfortable position, and monitored by staff every 10-15 minutes to ensure comfort. The patches remained in place for 30 minutes.  The  patches were then removed and the cleaning gel was applied and allowed to sit for an additional 60 seconds, after which the area was wiped clean.      The patient was then discharged in stable condition.        DISCUSSION:  A Qutenza patch was applied today with the purpose of treating the patients nerve pain. They were informed that they may have some burning of the skin for a few days, and should not take a hot shower for 2-3 days as that may irritate the area.  They were informed that the area may feel like they had a sunburn. They were informed that it can take about 2-4 weeks for the effects of the patch to be fully realized.     I will see the patient for follow up in 1 month.     Plan to repeat every 91 days.

## 2024-01-31 ENCOUNTER — HOSPITAL ENCOUNTER (OUTPATIENT)
Facility: OTHER | Age: 79
Discharge: HOME | End: 2024-02-01
Attending: EMERGENCY MEDICINE | Admitting: EMERGENCY MEDICINE
Payer: MEDICARE

## 2024-01-31 DIAGNOSIS — K62.5 BRBPR (BRIGHT RED BLOOD PER RECTUM): ICD-10-CM

## 2024-01-31 DIAGNOSIS — R42 DIZZINESS: ICD-10-CM

## 2024-01-31 DIAGNOSIS — R53.1 WEAKNESS: ICD-10-CM

## 2024-01-31 DIAGNOSIS — K64.4 EXTERNAL HEMORRHOIDS: Primary | ICD-10-CM

## 2024-01-31 LAB
ABO + RH BLD: NORMAL
ALBUMIN SERPL BCP-MCNC: 3.4 G/DL (ref 3.5–5.2)
ALP SERPL-CCNC: 70 U/L (ref 55–135)
ALT SERPL W/O P-5'-P-CCNC: 16 U/L (ref 10–44)
ANION GAP SERPL CALC-SCNC: 11 MMOL/L (ref 8–16)
AST SERPL-CCNC: 20 U/L (ref 10–40)
BASOPHILS # BLD AUTO: 0.05 K/UL (ref 0–0.2)
BASOPHILS # BLD AUTO: 0.06 K/UL (ref 0–0.2)
BASOPHILS NFR BLD: 0.4 % (ref 0–1.9)
BASOPHILS NFR BLD: 0.5 % (ref 0–1.9)
BILIRUB SERPL-MCNC: 0.1 MG/DL (ref 0.1–1)
BLD GP AB SCN CELLS X3 SERPL QL: NORMAL
BUN SERPL-MCNC: 10 MG/DL (ref 8–23)
CALCIUM SERPL-MCNC: 9.5 MG/DL (ref 8.7–10.5)
CHLORIDE SERPL-SCNC: 102 MMOL/L (ref 95–110)
CO2 SERPL-SCNC: 23 MMOL/L (ref 23–29)
CREAT SERPL-MCNC: 0.9 MG/DL (ref 0.5–1.4)
CREAT SERPL-MCNC: 1.2 MG/DL (ref 0.5–1.4)
DIFFERENTIAL METHOD BLD: ABNORMAL
DIFFERENTIAL METHOD BLD: ABNORMAL
EOSINOPHIL # BLD AUTO: 0.6 K/UL (ref 0–0.5)
EOSINOPHIL # BLD AUTO: 0.7 K/UL (ref 0–0.5)
EOSINOPHIL NFR BLD: 5.1 % (ref 0–8)
EOSINOPHIL NFR BLD: 5.2 % (ref 0–8)
ERYTHROCYTE [DISTWIDTH] IN BLOOD BY AUTOMATED COUNT: 15.4 % (ref 11.5–14.5)
ERYTHROCYTE [DISTWIDTH] IN BLOOD BY AUTOMATED COUNT: 15.5 % (ref 11.5–14.5)
EST. GFR  (NO RACE VARIABLE): 47 ML/MIN/1.73 M^2
GLUCOSE SERPL-MCNC: 312 MG/DL (ref 70–110)
HCT VFR BLD AUTO: 33.1 % (ref 37–48.5)
HCT VFR BLD AUTO: 33.6 % (ref 37–48.5)
HGB BLD-MCNC: 10.7 G/DL (ref 12–16)
HGB BLD-MCNC: 10.7 G/DL (ref 12–16)
IMM GRANULOCYTES # BLD AUTO: 0.05 K/UL (ref 0–0.04)
IMM GRANULOCYTES # BLD AUTO: 0.06 K/UL (ref 0–0.04)
IMM GRANULOCYTES NFR BLD AUTO: 0.4 % (ref 0–0.5)
IMM GRANULOCYTES NFR BLD AUTO: 0.5 % (ref 0–0.5)
INR PPP: 1 (ref 0.8–1.2)
LYMPHOCYTES # BLD AUTO: 2.6 K/UL (ref 1–4.8)
LYMPHOCYTES # BLD AUTO: 3.3 K/UL (ref 1–4.8)
LYMPHOCYTES NFR BLD: 22.3 % (ref 18–48)
LYMPHOCYTES NFR BLD: 26.5 % (ref 18–48)
MCH RBC QN AUTO: 27.4 PG (ref 27–31)
MCH RBC QN AUTO: 27.8 PG (ref 27–31)
MCHC RBC AUTO-ENTMCNC: 31.8 G/DL (ref 32–36)
MCHC RBC AUTO-ENTMCNC: 32.3 G/DL (ref 32–36)
MCV RBC AUTO: 86 FL (ref 82–98)
MCV RBC AUTO: 86 FL (ref 82–98)
MONOCYTES # BLD AUTO: 0.9 K/UL (ref 0.3–1)
MONOCYTES # BLD AUTO: 0.9 K/UL (ref 0.3–1)
MONOCYTES NFR BLD: 7.4 % (ref 4–15)
MONOCYTES NFR BLD: 7.9 % (ref 4–15)
NEUTROPHILS # BLD AUTO: 7.5 K/UL (ref 1.8–7.7)
NEUTROPHILS # BLD AUTO: 7.5 K/UL (ref 1.8–7.7)
NEUTROPHILS NFR BLD: 59.9 % (ref 38–73)
NEUTROPHILS NFR BLD: 63.9 % (ref 38–73)
NRBC BLD-RTO: 0 /100 WBC
NRBC BLD-RTO: 0 /100 WBC
PLATELET # BLD AUTO: 258 K/UL (ref 150–450)
PLATELET # BLD AUTO: 267 K/UL (ref 150–450)
PMV BLD AUTO: 11.5 FL (ref 9.2–12.9)
PMV BLD AUTO: 11.5 FL (ref 9.2–12.9)
POCT GLUCOSE: 244 MG/DL (ref 70–110)
POCT GLUCOSE: 284 MG/DL (ref 70–110)
POCT GLUCOSE: 290 MG/DL (ref 70–110)
POTASSIUM SERPL-SCNC: 4 MMOL/L (ref 3.5–5.1)
PROT SERPL-MCNC: 6.6 G/DL (ref 6–8.4)
PROTHROMBIN TIME: 10.6 SEC (ref 9–12.5)
RBC # BLD AUTO: 3.85 M/UL (ref 4–5.4)
RBC # BLD AUTO: 3.9 M/UL (ref 4–5.4)
SAMPLE: NORMAL
SODIUM SERPL-SCNC: 136 MMOL/L (ref 136–145)
SPECIMEN OUTDATE: NORMAL
WBC # BLD AUTO: 11.74 K/UL (ref 3.9–12.7)
WBC # BLD AUTO: 12.46 K/UL (ref 3.9–12.7)

## 2024-01-31 PROCEDURE — 85025 COMPLETE CBC W/AUTO DIFF WBC: CPT | Performed by: EMERGENCY MEDICINE

## 2024-01-31 PROCEDURE — 99285 EMERGENCY DEPT VISIT HI MDM: CPT | Mod: 25

## 2024-01-31 PROCEDURE — 96374 THER/PROPH/DIAG INJ IV PUSH: CPT

## 2024-01-31 PROCEDURE — 27000221 HC OXYGEN, UP TO 24 HOURS

## 2024-01-31 PROCEDURE — 25000003 PHARM REV CODE 250: Performed by: PHYSICIAN ASSISTANT

## 2024-01-31 PROCEDURE — 96372 THER/PROPH/DIAG INJ SC/IM: CPT | Mod: 59 | Performed by: PHYSICIAN ASSISTANT

## 2024-01-31 PROCEDURE — 63600175 PHARM REV CODE 636 W HCPCS: Performed by: EMERGENCY MEDICINE

## 2024-01-31 PROCEDURE — C9113 INJ PANTOPRAZOLE SODIUM, VIA: HCPCS | Performed by: EMERGENCY MEDICINE

## 2024-01-31 PROCEDURE — 93005 ELECTROCARDIOGRAM TRACING: CPT

## 2024-01-31 PROCEDURE — 85610 PROTHROMBIN TIME: CPT | Performed by: EMERGENCY MEDICINE

## 2024-01-31 PROCEDURE — 94761 N-INVAS EAR/PLS OXIMETRY MLT: CPT

## 2024-01-31 PROCEDURE — 93010 ELECTROCARDIOGRAM REPORT: CPT | Mod: ,,, | Performed by: INTERNAL MEDICINE

## 2024-01-31 PROCEDURE — 80053 COMPREHEN METABOLIC PANEL: CPT | Performed by: EMERGENCY MEDICINE

## 2024-01-31 PROCEDURE — 25500020 PHARM REV CODE 255: Performed by: EMERGENCY MEDICINE

## 2024-01-31 PROCEDURE — 86901 BLOOD TYPING SEROLOGIC RH(D): CPT | Performed by: EMERGENCY MEDICINE

## 2024-01-31 PROCEDURE — G0378 HOSPITAL OBSERVATION PER HR: HCPCS

## 2024-01-31 PROCEDURE — 82962 GLUCOSE BLOOD TEST: CPT

## 2024-01-31 PROCEDURE — 99900035 HC TECH TIME PER 15 MIN (STAT)

## 2024-01-31 PROCEDURE — 36415 COLL VENOUS BLD VENIPUNCTURE: CPT | Performed by: PHYSICIAN ASSISTANT

## 2024-01-31 PROCEDURE — 85025 COMPLETE CBC W/AUTO DIFF WBC: CPT | Mod: 91 | Performed by: PHYSICIAN ASSISTANT

## 2024-01-31 PROCEDURE — 63600175 PHARM REV CODE 636 W HCPCS: Performed by: PHYSICIAN ASSISTANT

## 2024-01-31 PROCEDURE — 82565 ASSAY OF CREATININE: CPT

## 2024-01-31 RX ORDER — INSULIN ASPART 100 [IU]/ML
0-5 INJECTION, SOLUTION INTRAVENOUS; SUBCUTANEOUS
Status: DISCONTINUED | OUTPATIENT
Start: 2024-01-31 | End: 2024-02-01 | Stop reason: HOSPADM

## 2024-01-31 RX ORDER — ATORVASTATIN CALCIUM 40 MG/1
40 TABLET, FILM COATED ORAL NIGHTLY
COMMUNITY

## 2024-01-31 RX ORDER — PREGABALIN 75 MG/1
150 CAPSULE ORAL 3 TIMES DAILY
Status: DISCONTINUED | OUTPATIENT
Start: 2024-01-31 | End: 2024-02-01 | Stop reason: HOSPADM

## 2024-01-31 RX ORDER — IBUPROFEN 200 MG
16 TABLET ORAL
Status: DISCONTINUED | OUTPATIENT
Start: 2024-01-31 | End: 2024-02-01 | Stop reason: HOSPADM

## 2024-01-31 RX ORDER — HYDROCODONE BITARTRATE AND ACETAMINOPHEN 10; 325 MG/1; MG/1
1 TABLET ORAL EVERY 8 HOURS PRN
Status: DISCONTINUED | OUTPATIENT
Start: 2024-01-31 | End: 2024-02-01 | Stop reason: HOSPADM

## 2024-01-31 RX ORDER — METHOCARBAMOL 500 MG/1
500 TABLET, FILM COATED ORAL 3 TIMES DAILY PRN
Status: DISCONTINUED | OUTPATIENT
Start: 2024-01-31 | End: 2024-01-31

## 2024-01-31 RX ORDER — ASPIRIN 81 MG/1
81 TABLET ORAL DAILY
Status: DISCONTINUED | OUTPATIENT
Start: 2024-01-31 | End: 2024-02-01 | Stop reason: HOSPADM

## 2024-01-31 RX ORDER — POLYETHYLENE GLYCOL 3350 17 G/17G
17 POWDER, FOR SOLUTION ORAL DAILY
Status: DISCONTINUED | OUTPATIENT
Start: 2024-01-31 | End: 2024-02-01 | Stop reason: HOSPADM

## 2024-01-31 RX ORDER — CLONIDINE HYDROCHLORIDE 0.1 MG/1
0.1 TABLET ORAL 2 TIMES DAILY
Status: DISCONTINUED | OUTPATIENT
Start: 2024-01-31 | End: 2024-02-01 | Stop reason: HOSPADM

## 2024-01-31 RX ORDER — PANTOPRAZOLE SODIUM 40 MG/1
40 TABLET, DELAYED RELEASE ORAL DAILY
Status: DISCONTINUED | OUTPATIENT
Start: 2024-01-31 | End: 2024-02-01 | Stop reason: HOSPADM

## 2024-01-31 RX ORDER — SERTRALINE HYDROCHLORIDE 100 MG/1
100 TABLET, FILM COATED ORAL DAILY
Status: DISCONTINUED | OUTPATIENT
Start: 2024-01-31 | End: 2024-02-01 | Stop reason: HOSPADM

## 2024-01-31 RX ORDER — ALLOPURINOL 300 MG/1
300 TABLET ORAL DAILY
Status: DISCONTINUED | OUTPATIENT
Start: 2024-01-31 | End: 2024-02-01 | Stop reason: HOSPADM

## 2024-01-31 RX ORDER — GLUCAGON 1 MG
1 KIT INJECTION
Status: DISCONTINUED | OUTPATIENT
Start: 2024-01-31 | End: 2024-02-01 | Stop reason: HOSPADM

## 2024-01-31 RX ORDER — PIOGLITAZONEHYDROCHLORIDE 15 MG/1
15 TABLET ORAL DAILY
COMMUNITY

## 2024-01-31 RX ORDER — PREGABALIN 75 MG/1
150 CAPSULE ORAL 3 TIMES DAILY
Status: DISCONTINUED | OUTPATIENT
Start: 2024-01-31 | End: 2024-01-31

## 2024-01-31 RX ORDER — METHOCARBAMOL 500 MG/1
500 TABLET, FILM COATED ORAL 3 TIMES DAILY PRN
Status: DISCONTINUED | OUTPATIENT
Start: 2024-01-31 | End: 2024-02-01 | Stop reason: HOSPADM

## 2024-01-31 RX ORDER — METOCLOPRAMIDE 5 MG/1
5 TABLET ORAL DAILY PRN
Status: DISCONTINUED | OUTPATIENT
Start: 2024-01-31 | End: 2024-02-01 | Stop reason: HOSPADM

## 2024-01-31 RX ORDER — PANTOPRAZOLE SODIUM 40 MG/10ML
40 INJECTION, POWDER, LYOPHILIZED, FOR SOLUTION INTRAVENOUS
Status: COMPLETED | OUTPATIENT
Start: 2024-01-31 | End: 2024-01-31

## 2024-01-31 RX ORDER — AMLODIPINE BESYLATE 5 MG/1
5 TABLET ORAL DAILY
COMMUNITY
End: 2024-02-27

## 2024-01-31 RX ORDER — AMOXICILLIN 250 MG
1 CAPSULE ORAL 2 TIMES DAILY PRN
Status: DISCONTINUED | OUTPATIENT
Start: 2024-01-31 | End: 2024-02-01 | Stop reason: HOSPADM

## 2024-01-31 RX ORDER — DICYCLOMINE HYDROCHLORIDE 10 MG/1
10 CAPSULE ORAL 3 TIMES DAILY
COMMUNITY

## 2024-01-31 RX ORDER — ATORVASTATIN CALCIUM 20 MG/1
40 TABLET, FILM COATED ORAL NIGHTLY
Status: DISCONTINUED | OUTPATIENT
Start: 2024-01-31 | End: 2024-01-31

## 2024-01-31 RX ORDER — IBUPROFEN 200 MG
24 TABLET ORAL
Status: DISCONTINUED | OUTPATIENT
Start: 2024-01-31 | End: 2024-02-01 | Stop reason: HOSPADM

## 2024-01-31 RX ADMIN — INSULIN ASPART 1 UNITS: 100 INJECTION, SOLUTION INTRAVENOUS; SUBCUTANEOUS at 08:01

## 2024-01-31 RX ADMIN — IOHEXOL 130 ML: 350 INJECTION, SOLUTION INTRAVENOUS at 05:01

## 2024-01-31 RX ADMIN — METHOCARBAMOL 500 MG: 500 TABLET ORAL at 08:01

## 2024-01-31 RX ADMIN — ASPIRIN 81 MG: 81 TABLET, COATED ORAL at 09:01

## 2024-01-31 RX ADMIN — ALLOPURINOL 300 MG: 300 TABLET ORAL at 11:01

## 2024-01-31 RX ADMIN — PANTOPRAZOLE SODIUM 40 MG: 40 TABLET, DELAYED RELEASE ORAL at 09:01

## 2024-01-31 RX ADMIN — PREGABALIN 150 MG: 75 CAPSULE ORAL at 08:01

## 2024-01-31 RX ADMIN — PREGABALIN 150 MG: 75 CAPSULE ORAL at 03:01

## 2024-01-31 RX ADMIN — CLONIDINE HYDROCHLORIDE 0.1 MG: 0.1 TABLET ORAL at 08:01

## 2024-01-31 RX ADMIN — HYDROCODONE BITARTRATE AND ACETAMINOPHEN 1 TABLET: 10; 325 TABLET ORAL at 03:01

## 2024-01-31 RX ADMIN — PANTOPRAZOLE SODIUM 40 MG: 40 INJECTION, POWDER, LYOPHILIZED, FOR SOLUTION INTRAVENOUS at 04:01

## 2024-01-31 RX ADMIN — INSULIN DETEMIR 15 UNITS: 100 INJECTION, SOLUTION SUBCUTANEOUS at 09:01

## 2024-01-31 RX ADMIN — CLONIDINE HYDROCHLORIDE 0.1 MG: 0.1 TABLET ORAL at 11:01

## 2024-01-31 RX ADMIN — SERTRALINE HYDROCHLORIDE 100 MG: 25 TABLET ORAL at 09:01

## 2024-01-31 RX ADMIN — ATORVASTATIN CALCIUM 40 MG: 20 TABLET, FILM COATED ORAL at 08:01

## 2024-01-31 NOTE — ASSESSMENT & PLAN NOTE
Lab Results   Component Value Date    HGBA1C 10.4 (H) 12/14/2023     Glucose 200-300 on admission  Start 15 units detemir with low dose SSI   Accuchecks ACHS  Diabetic diet    Hold Oral hypoglycemics while patient is in the hospital.

## 2024-01-31 NOTE — ASSESSMENT & PLAN NOTE
Last echo with pEF  Appears compensated  Holding lasix 40mg, losartan 50mg and MTP 100mg while feeling dizzy and with bleed

## 2024-01-31 NOTE — ASSESSMENT & PLAN NOTE
Home regimen: losartan 50, metoprolol 100, clonidine 0.1 BID and amlodipine 5  Holding meds besides clonidine while with bleed and dizziness  Continue clonidine to prevent rebound HTN

## 2024-01-31 NOTE — ASSESSMENT & PLAN NOTE
patient takes Lyrica, Norco and Robaxin for pain.  She is followed by pain management out patient     -continue Lyrica 150 mg TID  -continue Robaxin 500 mg  -continue Norco 10 mg-325 mg t.i.d.

## 2024-01-31 NOTE — ASSESSMENT & PLAN NOTE
Patient with known CAD s/p stent placement, which is controlled Will continue ASA Continue to monitor on telemetry.  Patient unable to tolerate atorvastatin and states she receives injections of evolocumab

## 2024-01-31 NOTE — H&P
[facility] Emergency [facility] Medicine  History & Physical    Patient Name: Indira Waters  MRN: [id number]  Patient Class: OP- Observation  Admission Date: 1/31/2024  Attending Physician: HEAVENLY Perez MD   Primary Care Provider: Chun Zapata MD         Patient information was obtained from patient, past medical records, and ER records.     Subjective:     Principal Problem:<principal problem not specified>    Chief Complaint:   Chief Complaint   Patient presents with    Hemorrhoids     C/o hemorrhoids with bright red blood with bowel movements and on noted on pads, report resolved diarrhea a couple days ago        HPI: Indira Waters is a 78-year-old female with a past medical history of arthritis, DM, CAD, fibromyalgia, hypertension, hyperlipidemia, past MI, CVA and lupus who presents for rectal bleeding for several days. Daughter at bedside assists with history, reports her mother has been more lethargic and delayed this past day. Ms. Waters has some mild rectal pain, mostly with bowel movements. She says there is red blood on the tissue paper and in the bowl after having Bms, but she also had to use a feminine pad to help absorb the rectal bleeding. No nausea or vomiting and no dark stool. She does have issues with constipation but says she has had soft Bms on her bowel regimen and goes daily. She does not take any anti-coagulants.     In ER: BP stable, hgb 10.7; CTA without acute GI bleed. On exam she had nonthrombosed external hemorrhoids and rectal prolapse which was easily reduced. Since then she denies further bleeding    Past Medical History:   Diagnosis Date    Arthritis     Back pain     Cancer     ovarian    Cervical cancer     Coronary artery disease     Depression     Diabetes mellitus     Fibromyalgia     Heart attack     History of MI (myocardial infarction)     Hyperlipidemia     Hypertension     Lupus     Stroke     slight left sided weakness       Past Surgical  History:   Procedure Laterality Date    APPENDECTOMY       SECTION      2    CORONARY ANGIOGRAPHY N/A 2022    Procedure: ANGIOGRAM, CORONARY ARTERY;  Surgeon: Jose L Méndez MD;  Location: [facility] CATH LAB;  Service: Cardiology;  Laterality: N/A;    HYSTERECTOMY      with USO for cervical cancer    INJECTION OF ANESTHETIC AGENT AROUND NERVE Bilateral 2021    Procedure: BLOCK, NERVE, SYMPATHIC;  Surgeon: Holden Pereira MD;  Location: [facility] PAIN MGT;  Service: Pain Management;  Laterality: Bilateral;    INJECTION OF ANESTHETIC AGENT AROUND NERVE N/A 2021    Procedure: BLOCK, NERVE, SYMPATHETIC  need consent;  Surgeon: Holden Pereira MD;  Location: [facility] PAIN MGT;  Service: Pain Management;  Laterality: N/A;    YARED LINK,SWALLOW FUNCTION,CINE/VIDEO RECORD  2021         TONSILLECTOMY      TRIAL OF SPINAL CORD NERVE STIMULATOR N/A 3/8/2023    Procedure: LUMBAR SPINAL CORD STIMULATOR TRIAL NEVRO REP PATIENT STATES SHE NO LONGER TAKES PLAVIX;  Surgeon: Holden Pereira MD;  Location: [facility] PAIN MGT;  Service: Pain Management;  Laterality: N/A;       Review of patient's allergies indicates:   Allergen Reactions    Bleach (sodium hypochlorite) Shortness Of Breath    Nitrofurantoin macrocrystalline Anaphylaxis    Lipitor [atorvastatin] Diarrhea and Rash    Nsaids (non-steroidal anti-inflammatory drug)      Tolerates aspirin      Pcn [penicillins]     Toradol [ketorolac]        Current Facility-Administered Medications on File Prior to Encounter   Medication    capsaicin-skin cleanser patch 1 patch     Current Outpatient Medications on File Prior to Encounter   Medication Sig    hydrocortisone (ANUSOL-HC) 2.5 % rectal cream Procto-Med HC 2.5 % topical cream perineal applicator    nitroGLYCERIN (NITROSTAT) 0.4 MG SL tablet Place 1 tablet (0.4 mg total) under the tongue every 5 (five) minutes as needed for Chest pain.    acetaminophen (TYLENOL) 500 MG tablet Take 2 tablets (1,000 mg total) by  mouth every 8 (eight) hours as needed for Pain.    allopurinoL (ZYLOPRIM) 300 MG tablet TAKE ONE TABLET BY MOUTH EVERY DAY    amLODIPine (NORVASC) 5 MG tablet Take 5 mg by mouth once daily.    aspirin (ECOTRIN) 81 MG EC tablet Take 1 tablet (81 mg total) by mouth once daily.    atorvastatin (LIPITOR) 40 MG tablet Take 40 mg by mouth every evening.    blood sugar diagnostic Strp To check BG 6 times daily, to use with insurance preferred meter    blood-glucose meter kit To check BG 6 times daily, to use with insurance preferred meter    blood-glucose meter,continuous (DEXCOM G7 ) Misc Use as directed.    blood-glucose sensor (DEXCOM G7 SENSOR) Nicole Change every 10 days.    cloNIDine (CATAPRES) 0.1 MG tablet Take 1 tablet (0.1 mg total) by mouth 2 (two) times daily.    dicyclomine (BENTYL) 10 MG capsule Take 10 mg by mouth 3 (three) times daily.    evolocumab (REPATHA SURECLICK) 140 mg/mL PnIj Inject 1 mL (140 mg total) into the skin every 14 (fourteen) days.    fenofibrate micronized (LOFIBRA) 134 MG Cap Take 1 capsule (134 mg total) by mouth daily with breakfast.    furosemide (LASIX) 40 MG tablet TAKE ONE TABLET BY MOUTH EVERY DAY    HYDROcodone-acetaminophen (NORCO)  mg per tablet Take 1 tablet by mouth every 8 (eight) hours as needed for Pain.    insulin aspart U-100 (NOVOLOG FLEXPEN U-100 INSULIN) 100 unit/mL (3 mL) InPn pen Inject 14 Units into the skin 3 (three) times daily with meals. + meal correction scale. Max 40 units    insulin detemir U-100, Levemir, (LEVEMIR FLEXTOUCH U100 INSULIN) 100 unit/mL (3 mL) InPn pen Inject 20 Units into the skin once daily AND 18 Units every evening.    ipratropium-albuteroL (COMBIVENT)  mcg/actuation inhaler Inhale 1 puff into the lungs by mouth every 6 (six) hours.    lancets Misc To check BG 6 times daily, to use with insurance preferred meter    losartan (COZAAR) 50 MG tablet Take 1 tablet (50 mg total) by mouth once daily.    methocarbamoL (ROBAXIN)  "500 MG Tab Take 1 tablet (500 mg total) by mouth 3 (three) times daily as needed (muscle spasm).    metoclopramide HCl (REGLAN) 5 MG tablet TAKE ONE TABLET BY MOUTH FOUR TIMES DAILY AS NEEDED FOR NAUSEA PREVENTION    metoprolol succinate (TOPROL-XL) 100 MG 24 hr tablet Take 1 tablet (100 mg total) by mouth once daily.    mupirocin (BACTROBAN) 2 % ointment Apply topically 3 (three) times daily as needed (skin breakdown).    NIFEdipine (PROCARDIA-XL) 30 MG (OSM) 24 hr tablet Take 1 tablet (30 mg total) by mouth 2 (two) times a day.    pantoprazole (PROTONIX) 40 MG tablet Take 1 tablet (40 mg total) by mouth once daily.    pen needle, diabetic 31 gauge x 1/4" Ndle 1 each by Misc.(Non-Drug; Combo Route) route 5 (five) times daily.    pioglitazone (ACTOS) 15 MG tablet Take 15 mg by mouth once daily.    pregabalin (LYRICA) 150 MG capsule TAKE ONE CAPSULE BY MOUTH 3 TIMES DAILY    senna-docusate 8.6-50 mg (PERICOLACE) 8.6-50 mg per tablet Take 1 tablet by mouth 2 (two) times daily as needed for Constipation.    sertraline (ZOLOFT) 100 MG tablet Take 1 tablet (100 mg total) by mouth once daily.    teriparatide 20 mcg/dose (620mcg/2.48mL) PnIj Inject 20 mcg into the skin once daily.    triamcinolone acetonide 0.1% (KENALOG) 0.1 % cream Apply to affected areas of body twice daily as needed rash. Do not use on face, underarms, or groin.    [DISCONTINUED] blood-glucose transmitter (DEXCOM G6 TRANSMITTER) Nicole 1 each by Misc.(Non-Drug; Combo Route) route continuous prn.     Family History       Problem Relation (Age of Onset)    COPD Mother    Colon cancer Maternal Grandmother    Coronary artery disease Father    Diabetes Father    Hernia Mother    Lupus Mother    Ovarian cancer Mother    Uterine cancer Mother          Tobacco Use    Smoking status: Former     Current packs/day: 0.00     Types: Cigarettes     Quit date: 11/2020     Years since quitting: 3.2     Passive exposure: Past    Smokeless tobacco: Never   Substance and " Sexual Activity    Alcohol use: Not Currently     Comment: occasionally    Drug use: Yes     Types: Hydrocodone     Comment: three times a day    Sexual activity: Not Currently     Review of Systems   Constitutional:  Positive for fatigue. Negative for fever.   Respiratory:  Negative for cough and shortness of breath.    Cardiovascular:  Negative for chest pain.   Gastrointestinal:  Positive for abdominal pain, anal bleeding and blood in stool. Negative for constipation, diarrhea, nausea and vomiting.   Genitourinary:  Negative for difficulty urinating, dysuria and hematuria.   Musculoskeletal:  Positive for arthralgias (chronic).   Neurological:  Positive for weakness (generalized).   Psychiatric/Behavioral:  Negative for agitation and confusion.      Objective:     Vital Signs (Most Recent):  Temp: 98.1 °F (36.7 °C) (01/31/24 1058)  Pulse: 72 (01/31/24 1233)  Resp: 15 (01/31/24 1233)  BP: 130/60 (01/31/24 1233)  SpO2: 97 % (01/31/24 1233) Vital Signs (24h Range):  Temp:  [98.1 °F (36.7 °C)] 98.1 °F (36.7 °C)  Pulse:  [68-77] 72  Resp:  [12-20] 15  SpO2:  [95 %-100 %] 97 %  BP: (130-180)/(60-76) 130/60     Weight: 84.4 kg (186 lb)  Body mass index is 36.33 kg/m².     Physical Exam  Constitutional:       Appearance: Normal appearance.   HENT:      Head: Normocephalic and atraumatic.   Eyes:      Extraocular Movements: Extraocular movements intact.   Cardiovascular:      Rate and Rhythm: Normal rate and regular rhythm.   Pulmonary:      Effort: Pulmonary effort is normal. No respiratory distress.   Abdominal:      General: Bowel sounds are normal. There is no distension.      Palpations: Abdomen is soft.      Tenderness: There is abdominal tenderness (lower).   Musculoskeletal:      Right lower leg: No edema.      Left lower leg: No edema.   Skin:     General: Skin is warm and dry.   Neurological:      General: No focal deficit present.      Mental Status: She is alert.   Psychiatric:         Mood and Affect: Mood  normal.         Behavior: Behavior normal.                Significant Labs: All pertinent labs within the past 24 hours have been reviewed.    Significant Imaging: I have reviewed all pertinent imaging results/findings within the past 24 hours.  Assessment/Plan:     BRBPR (bright red blood per rectum)  Indira Waters presents with bleeding from rectum for several days, more profuse with Bms, some pain associated    - vitals stable  - hgb 10.7 (BL 11)  - CT AP without evidence of GIB  - On exam ER provided noted external nonthrombosed hemorrhoids and rectal prolapse, easily reduced  - continue to monitor and trend hgb  - colorectal surgery referral at discharge      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 disease    Lab Results   Component Value Date    HGBA1C 10.4 (H) 12/14/2023     Glucose 200-300 on admission  Start 15 units detemir with low dose SSI   Accuchecks ACHS  Diabetic diet    Hold Oral hypoglycemics while patient is in the hospital.    Chronic diastolic (congestive) heart failure  Last echo with pEF  Appears compensated  Holding lasix 40mg, losartan 50mg and MTP 100mg while feeling dizzy and with bleed    Stage 3b chronic kidney disease  Cr stable and at baseline, continue trend    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 pectoris  Patient with known CAD s/p stent placement, which is controlled Will continue ASA Continue to monitor on telemetry.  Patient unable to tolerate atorvastatin and states she receives injections of evolocumab       Essential hypertension  Home regimen: losartan 50, metoprolol 100, clonidine 0.1 BID and amlodipine 5  Holding meds besides clonidine while with bleed and dizziness  Continue clonidine to prevent rebound HTN    Chronic pain syndrome  patient takes Lyrica, Norco and Robaxin for pain.  She is followed by pain management out patient     -continue Lyrica 150 mg TID  -continue Robaxin 500 mg  -continue Norco 10 mg-325  mg t.i.d.      Anxiety disorder  Continue zoloft      Gastroesophageal reflux disease without esophagitis  Continue home protonix        VTE Risk Mitigation (From admission, onward)           Ordered     IP VTE HIGH RISK PATIENT  Once         01/31/24 0909     Place sequential compression device  Until discontinued         01/31/24 0909                                    Elena Woodson PA-C  Department of Hospital Medicine  [facility] - Emergency Dept

## 2024-01-31 NOTE — SUBJECTIVE & OBJECTIVE
Past Medical History:   Diagnosis Date    Arthritis     Back pain     Cancer     ovarian    Cervical cancer     Coronary artery disease     Depression     Diabetes mellitus     Fibromyalgia     Heart attack     History of MI (myocardial infarction)     Hyperlipidemia     Hypertension     Lupus     Stroke     slight left sided weakness       Past Surgical History:   Procedure Laterality Date    APPENDECTOMY       SECTION      2    CORONARY ANGIOGRAPHY N/A 2022    Procedure: ANGIOGRAM, CORONARY ARTERY;  Surgeon: Jose L Méndez MD;  Location: [facility] CATH LAB;  Service: Cardiology;  Laterality: N/A;    HYSTERECTOMY      with USO for cervical cancer    INJECTION OF ANESTHETIC AGENT AROUND NERVE Bilateral 2021    Procedure: BLOCK, NERVE, SYMPATHIC;  Surgeon: Holden Pereira MD;  Location: [facility] PAIN MGT;  Service: Pain Management;  Laterality: Bilateral;    INJECTION OF ANESTHETIC AGENT AROUND NERVE N/A 2021    Procedure: BLOCK, NERVE, SYMPATHETIC  need consent;  Surgeon: Holden Pereira MD;  Location: [facility] PAIN MGT;  Service: Pain Management;  Laterality: N/A;    [place] EVAL,SWALLOW FUNCTION,CINE/VIDEO RECORD  2021         TONSILLECTOMY      TRIAL OF SPINAL CORD NERVE STIMULATOR N/A 3/8/2023    Procedure: LUMBAR SPINAL CORD STIMULATOR TRIAL NEVRO REP PATIENT STATES SHE NO LONGER TAKES PLAVIX;  Surgeon: Holden Pereira MD;  Location: [facility] PAIN MGT;  Service: Pain Management;  Laterality: N/A;       Review of patient's allergies indicates:   Allergen Reactions    Bleach (sodium hypochlorite) Shortness Of Breath    Nitrofurantoin macrocrystalline Anaphylaxis    Lipitor [atorvastatin] Diarrhea and Rash    Nsaids (non-steroidal anti-inflammatory drug)      Tolerates aspirin      Pcn [penicillins]     Toradol [ketorolac]        Current Facility-Administered Medications on File Prior to Encounter   Medication    capsaicin-skin cleanser patch 1 patch     Current Outpatient Medications on File  Prior to Encounter   Medication Sig    hydrocortisone (ANUSOL-HC) 2.5 % rectal cream Procto-Med HC 2.5 % topical cream perineal applicator    nitroGLYCERIN (NITROSTAT) 0.4 MG SL tablet Place 1 tablet (0.4 mg total) under the tongue every 5 (five) minutes as needed for Chest pain.    acetaminophen (TYLENOL) 500 MG tablet Take 2 tablets (1,000 mg total) by mouth every 8 (eight) hours as needed for Pain.    allopurinoL (ZYLOPRIM) 300 MG tablet TAKE ONE TABLET BY MOUTH EVERY DAY    amLODIPine (NORVASC) 5 MG tablet Take 5 mg by mouth once daily.    aspirin (ECOTRIN) 81 MG EC tablet Take 1 tablet (81 mg total) by mouth once daily.    atorvastatin (LIPITOR) 40 MG tablet Take 40 mg by mouth every evening.    blood sugar diagnostic Strp To check BG 6 times daily, to use with insurance preferred meter    blood-glucose meter kit To check BG 6 times daily, to use with insurance preferred meter    blood-glucose meter,continuous (DEXCOM G7 ) Misc Use as directed.    blood-glucose sensor (DEXCOM G7 SENSOR) Nicole Change every 10 days.    cloNIDine (CATAPRES) 0.1 MG tablet Take 1 tablet (0.1 mg total) by mouth 2 (two) times daily.    dicyclomine (BENTYL) 10 MG capsule Take 10 mg by mouth 3 (three) times daily.    evolocumab (REPATHA SURECLICK) 140 mg/mL PnIj Inject 1 mL (140 mg total) into the skin every 14 (fourteen) days.    fenofibrate micronized (LOFIBRA) 134 MG Cap Take 1 capsule (134 mg total) by mouth daily with breakfast.    furosemide (LASIX) 40 MG tablet TAKE ONE TABLET BY MOUTH EVERY DAY    HYDROcodone-acetaminophen (NORCO)  mg per tablet Take 1 tablet by mouth every 8 (eight) hours as needed for Pain.    insulin aspart U-100 (NOVOLOG FLEXPEN U-100 INSULIN) 100 unit/mL (3 mL) InPn pen Inject 14 Units into the skin 3 (three) times daily with meals. + meal correction scale. Max 40 units    insulin detemir U-100, Levemir, (LEVEMIR FLEXTOUCH U100 INSULIN) 100 unit/mL (3 mL) InPn pen Inject 20 Units into the skin  "once daily AND 18 Units every evening.    ipratropium-albuteroL (COMBIVENT)  mcg/actuation inhaler Inhale 1 puff into the lungs by mouth every 6 (six) hours.    lancets [facility] [place] [facility] To check BG 6 times daily, to use with insurance preferred meter    losartan (COZAAR) 50 MG tablet Take 1 tablet (50 mg total) by mouth once daily.    methocarbamoL (ROBAXIN) 500 MG Tab Take 1 tablet (500 mg total) by mouth 3 (three) times daily as needed (muscle spasm).    metoclopramide HCl (REGLAN) 5 MG tablet TAKE ONE TABLET BY MOUTH FOUR TIMES DAILY AS NEEDED FOR NAUSEA PREVENTION    metoprolol succinate (TOPROL-XL) 100 MG 24 hr tablet Take 1 tablet (100 mg total) by mouth once daily.    mupirocin (BACTROBAN) 2 % ointment Apply topically 3 (three) times daily as needed (skin breakdown).    NIFEdipine (PROCARDIA-XL) 30 MG (OSM) 24 hr tablet Take 1 tablet (30 mg total) by mouth 2 (two) times a day.    pantoprazole (PROTONIX) 40 MG tablet Take 1 tablet (40 mg total) by mouth once daily.    pen needle, diabetic 31 gauge x 1/4" Ndle 1 each by Misc.(Non-Drug; Combo Route) route 5 (five) times daily.    pioglitazone (ACTOS) 15 MG tablet Take 15 mg by mouth once daily.    pregabalin (LYRICA) 150 MG capsule TAKE ONE CAPSULE BY MOUTH 3 TIMES DAILY    senna-docusate 8.6-50 mg (PERICOLACE) 8.6-50 mg per tablet Take 1 tablet by mouth 2 (two) times daily as needed for Constipation.    sertraline (ZOLOFT) 100 MG tablet Take 1 tablet (100 mg total) by mouth once daily.    teriparatide 20 mcg/dose (620mcg/2.48mL) PnIj Inject 20 mcg into the skin once daily.    triamcinolone acetonide 0.1% (KENALOG) 0.1 % cream Apply to affected areas of body twice daily as needed rash. Do not use on face, underarms, or groin.    [DISCONTINUED] blood-glucose transmitter (DEXCOM G6 TRANSMITTER) Nicole 1 each by Misc.(Non-Drug; Combo Route) route continuous prn.     Family History       Problem Relation (Age of Onset)    COPD Mother    Colon cancer Maternal Grandmother    " Coronary artery disease Father    Diabetes Father    Hernia Mother    Lupus Mother    Ovarian cancer Mother    Uterine cancer Mother          Tobacco Use    Smoking status: Former     Current packs/day: 0.00     Types: Cigarettes     Quit date: 11/2020     Years since quitting: 3.2     Passive exposure: Past    Smokeless tobacco: Never   Substance and Sexual Activity    Alcohol use: Not Currently     Comment: occasionally    Drug use: Yes     Types: Hydrocodone     Comment: three times a day    Sexual activity: Not Currently     Review of Systems   Constitutional:  Positive for fatigue. Negative for fever.   Respiratory:  Negative for cough and shortness of breath.    Cardiovascular:  Negative for chest pain.   Gastrointestinal:  Positive for abdominal pain, anal bleeding and blood in stool. Negative for constipation, diarrhea, nausea and vomiting.   Genitourinary:  Negative for difficulty urinating, dysuria and hematuria.   Musculoskeletal:  Positive for arthralgias (chronic).   Neurological:  Positive for weakness (generalized).   Psychiatric/Behavioral:  Negative for agitation and confusion.      Objective:     Vital Signs (Most Recent):  Temp: 98.1 °F (36.7 °C) (01/31/24 1058)  Pulse: 72 (01/31/24 1233)  Resp: 15 (01/31/24 1233)  BP: 130/60 (01/31/24 1233)  SpO2: 97 % (01/31/24 1233) Vital Signs (24h Range):  Temp:  [98.1 °F (36.7 °C)] 98.1 °F (36.7 °C)  Pulse:  [68-77] 72  Resp:  [12-20] 15  SpO2:  [95 %-100 %] 97 %  BP: (130-180)/(60-76) 130/60     Weight: 84.4 kg (186 lb)  Body mass index is 36.33 kg/m².     Physical Exam  Constitutional:       Appearance: Normal appearance.   HENT:      Head: Normocephalic and atraumatic.   Eyes:      Extraocular Movements: Extraocular movements intact.   Cardiovascular:      Rate and Rhythm: Normal rate and regular rhythm.   Pulmonary:      Effort: Pulmonary effort is normal. No respiratory distress.   Abdominal:      General: Bowel sounds are normal. There is no  distension.      Palpations: Abdomen is soft.      Tenderness: There is abdominal tenderness (lower).   Musculoskeletal:      Right lower leg: No edema.      Left lower leg: No edema.   Skin:     General: Skin is warm and dry.   Neurological:      General: No focal deficit present.      Mental Status: She is alert.   Psychiatric:         Mood and Affect: Mood normal.         Behavior: Behavior normal.                Significant Labs: All pertinent labs within the past 24 hours have been reviewed.    Significant Imaging: I have reviewed all pertinent imaging results/findings within the past 24 hours.

## 2024-01-31 NOTE — ASSESSMENT & PLAN NOTE
Indira Waters presents with bleeding from rectum for several days, more profuse with Bms, some pain associated    - vitals stable  - hgb 10.7 (BL 11)  - CT AP without evidence of GIB  - On exam ER provided noted external nonthrombosed hemorrhoids and rectal prolapse, easily reduced  - continue to monitor and trend hgb  - colorectal surgery referral at discharge

## 2024-01-31 NOTE — HPI
Indira Waters is a 78-year-old female with a past medical history of arthritis, DM, CAD, fibromyalgia, hypertension, hyperlipidemia, past MI, CVA and lupus who presents for rectal bleeding for several days. Daughter at bedside assists with history, reports her mother has been more lethargic and delayed this past day. Ms. Waters has some mild rectal pain, mostly with bowel movements. She says there is red blood on the tissue paper and in the bowl after having Bms, but she also had to use a feminine pad to help absorb the rectal bleeding. No nausea or vomiting and no dark stool. She does have issues with constipation but says she has had soft Bms on her bowel regimen and goes daily. She does not take any anti-coagulants.     In ER: BP stable, hgb 10.7; CTA without acute GI bleed. On exam she had nonthrombosed external hemorrhoids and rectal prolapse which was easily reduced. Since then she denies further bleeding

## 2024-01-31 NOTE — ED PROVIDER NOTES
"Encounter Date: 2024    SCRIBE #1 NOTE: I, Roberto Mckinley, am scribing for, and in the presence of,  Sammie Roach MD.       History     Chief Complaint   Patient presents with    Hemorrhoids     C/o hemorrhoids with bright red blood with bowel movements and on noted on pads, report resolved diarrhea a couple days ago     Time seen by provider: 4:35 AM    Indira Waters is a 78 y.o. female, with a PMHx of HTN, HLD, CAD, MI, cervical cancer s/p hysterectomy, who presents to the ED with rectal bleeding for the past 2 or 3 days. The patient reports bright red blood present with bowel movements as well as baseline spotting on pads onset yesterday. She notes accompanying lower abdominal pain, dizziness, and weakness without bleeding anywhere else. Patient also notes having diarrhea a few days ago preceding these symptoms but states this has already resolved She reports previous episodes of rectal bleeding but never to this extent. Current blood thinners include daily baby aspirin which patient states she has been taking for "a long time". This is the extent of the patient's complaints today in the Emergency Department.      The history is provided by the patient.     Review of patient's allergies indicates:   Allergen Reactions    Bleach (sodium hypochlorite) Shortness Of Breath    Nitrofurantoin macrocrystalline Anaphylaxis    Lipitor [atorvastatin] Diarrhea and Rash    Nsaids (non-steroidal anti-inflammatory drug)      Tolerates aspirin      Pcn [penicillins]     Toradol [ketorolac]      Past Medical History:   Diagnosis Date    Arthritis     Back pain     Cancer     ovarian    Cervical cancer     Coronary artery disease     Depression     Diabetes mellitus     Fibromyalgia     Heart attack     History of MI (myocardial infarction)     Hyperlipidemia     Hypertension     Lupus     Stroke     slight left sided weakness     Past Surgical History:   Procedure Laterality Date    APPENDECTOMY       SECTION "      2    CORONARY ANGIOGRAPHY N/A 05/06/2022    Procedure: ANGIOGRAM, CORONARY ARTERY;  Surgeon: Jose L Méndez MD;  Location: [facility] CATH LAB;  Service: Cardiology;  Laterality: N/A;    HYSTERECTOMY      with USO for cervical cancer    INJECTION OF ANESTHETIC AGENT AROUND NERVE Bilateral 05/05/2021    Procedure: BLOCK, NERVE, SYMPATHIC;  Surgeon: Holden Pereira MD;  Location: [facility] PAIN MGT;  Service: Pain Management;  Laterality: Bilateral;    INJECTION OF ANESTHETIC AGENT AROUND NERVE N/A 08/25/2021    Procedure: BLOCK, NERVE, SYMPATHETIC  need consent;  Surgeon: Holden Pereira MD;  Location: [facility] PAIN MGT;  Service: Pain Management;  Laterality: N/A;    YARED LINK,SWALLOW FUNCTION,CINE/VIDEO RECORD  09/09/2021         TONSILLECTOMY      TRIAL OF SPINAL CORD NERVE STIMULATOR N/A 3/8/2023    Procedure: LUMBAR SPINAL CORD STIMULATOR TRIAL NEVRO REP PATIENT STATES SHE NO LONGER TAKES PLAVIX;  Surgeon: Holden Pereira MD;  Location: [facility] PAIN MGT;  Service: Pain Management;  Laterality: N/A;     Family History   Problem Relation Age of Onset    COPD Mother     Lupus Mother     Hernia Mother     Uterine cancer Mother         vs cervical cancer    Ovarian cancer Mother     Diabetes Father     Coronary artery disease Father     Colon cancer Maternal Grandmother         in her 50's     Social History     Tobacco Use    Smoking status: Former     Current packs/day: 0.00     Types: Cigarettes     Quit date: 11/2020     Years since quitting: 3.2     Passive exposure: Past    Smokeless tobacco: Never   Substance Use Topics    Alcohol use: Not Currently     Comment: occasionally    Drug use: Yes     Types: Hydrocodone     Comment: three times a day     Review of Systems   Constitutional:  Negative for chills and fever.   HENT:  Negative for congestion and rhinorrhea.    Respiratory:  Negative for chest tightness and shortness of breath.    Cardiovascular:  Negative for chest pain and palpitations.   Gastrointestinal:   Positive for abdominal pain, anal bleeding and blood in stool. Negative for diarrhea, nausea and vomiting.   Genitourinary:  Negative for dysuria and flank pain.   Musculoskeletal:  Negative for back pain and neck pain.   Skin:  Negative for color change and wound.   Neurological:  Positive for dizziness and weakness. Negative for headaches.       Physical Exam     Initial Vitals [01/31/24 0358]   BP Pulse Resp Temp SpO2   (!) 180/76 77 20 98.1 °F (36.7 °C) 95 %      MAP       --         Physical Exam    Nursing note and vitals reviewed.  Constitutional: She appears well-developed and well-nourished. She does not have a sickly appearance. No distress.   HENT:   Head: Normocephalic and atraumatic.   Right Ear: External ear normal.   Left Ear: External ear normal.   Eyes: Conjunctivae, EOM and lids are normal. Right eye exhibits no discharge. Left eye exhibits no discharge. Right conjunctiva is not injected. Right conjunctiva has no hemorrhage. Left conjunctiva is not injected. Left conjunctiva has no hemorrhage. No scleral icterus.   Neck: Phonation normal. No stridor present. No tracheal deviation present.   Normal range of motion.  Cardiovascular:  Normal rate, regular rhythm and normal heart sounds.     Exam reveals no friction rub.       No murmur heard.  Pulses:       Radial pulses are 2+ on the right side and 2+ on the left side.        Dorsalis pedis pulses are 2+ on the right side and 2+ on the left side.   Pulmonary/Chest: Breath sounds normal. No respiratory distress. She has no wheezes. She has no rhonchi. She has no rales.   Abdominal: Abdomen is soft. There is abdominal tenderness.   Bilateral lower abdominal tenderness.   Genitourinary: Rectum:      Guaiac result positive.   Guaiac positive stool. : Acceptable.   Genitourinary Comments: Multiple non thrombotic hemorrhoids. Small rectal prolapse. Light brown stool, positive guaiac.     Musculoskeletal:      Cervical back: Normal range of  motion.     Neurological: She is alert and oriented to person, place, and time. She has normal strength. GCS eye subscore is 4. GCS verbal subscore is 5. GCS motor subscore is 6.   Skin: Skin is warm.   Psychiatric: She has a normal mood and affect. Her speech is normal and behavior is normal. Judgment and thought content normal. Cognition and memory are normal.         ED Course   Procedures  Labs Reviewed   CBC W/ AUTO DIFFERENTIAL - Abnormal; Notable for the following components:       Result Value    RBC 3.85 (*)     Hemoglobin 10.7 (*)     Hematocrit 33.1 (*)     RDW 15.4 (*)     Immature Grans (Abs) 0.06 (*)     Eos # 0.7 (*)     All other components within normal limits   COMPREHENSIVE METABOLIC PANEL - Abnormal; Notable for the following components:    Glucose 312 (*)     Albumin 3.4 (*)     eGFR 47 (*)     All other components within normal limits   POCT GLUCOSE - Abnormal; Notable for the following components:    POCT Glucose 284 (*)     All other components within normal limits   PROTIME-INR   TYPE & SCREEN   ISTAT CREATININE          Imaging Results              CTA Acute GI [place], Abdomen and Pelvis (Final result)  Result time 01/31/24 06:19:33      Final result by Ramin Krishnamurthy DO (01/31/24 06:19:33)                   Impression:      1. No evidence of contrast extravasation to suggest active gastrointestinal hemorrhage.  2. Interval development of numerous ill-defined nonenhancing hypodensities in the liver, possibly related to foci of fat deposition, although additional etiologies are not excluded.  Recommend further evaluation with nonemergent MRI of the abdomen.  3. Urinary bladder wall thickening.  Correlate urinalysis to exclude cystitis.  4. Hepatomegaly and hepatic steatosis.      Electronically signed by: Ramin Krishnamurthy  Date:    01/31/2024  Time:    06:19               Narrative:    EXAMINATION:  CTA ACUTE GI BLEED, ABDOMEN AND PELVIS    CLINICAL HISTORY:  GI  bleeding;    TECHNIQUE:  Multiplanar images were obtained of the abdomen and pelvis from the hemidiaphragms through the symphysis pubis before and after the administration of intravenous contrast in the arterial phase and the portal venous phase.  Contrast dose: 130 Omnipaque 350.    COMPARISON:  CT of the abdomen and pelvis from 06/20/2022.    FINDINGS:  Lung Bases: Clear. The pleural spaces are clear.    Distal esophagus: Normal.    Heart: Heart size is normal. No pericardial effusion.    Liver: There are nonenhancing ill-defined heterogeneous hypodensities in the periphery of the right and left hepatic lobes.  There is hypoattenuation of the hepatic parenchyma compatible with hepatic steatosis.  The liver is enlarged.    Biliary tract: No intrahepatic or extrahepatic biliary ductal dilatation.    Gallbladder: No radiodense gallstone. No wall thickening or pericholecystic fluid.    Pancreas: There is fatty atrophy of the pancreas.  No pancreatic ductal dilatation.    Spleen: Normal.    Adrenals: Normal.    Kidneys and urinary collecting systems: There is a right-sided extrarenal pelvis.  There is no hydronephrosis.  There is renal atrophy of the bilateral kidneys.    Lymph nodes: None enlarged.    Stomach and bowel: Stomach is normal appearance. Visualized loops of small and large bowel are normal in caliber without evidence for inflammation or obstruction. No evidence of intraluminal contrast extravasation identified allowing for significant motion on arterial phase images as well as differences in positioning of the bowel on all phases. No contrast pooling is identified within bowel on the delayed phase images.  The appendix is not definitively seen.    Peritoneum and mesentery: No ascites or free intraperitoneal air. No abdominal fluid collection.    Vasculature: Moderate atherosclerosis.    Urinary bladder: There is urinary bladder wall thickening.    Reproductive organs: The uterus is surgically  absent.    Body wall: No abnormality.    Musculoskeletal: No aggressive osseous lesion.                                       Medications   pantoprazole injection 40 mg (40 mg Intravenous Given 1/31/24 9974)   iohexoL (OMNIPAQUE 350) injection 130 mL (130 mLs Intravenous Given 1/31/24 7858)     Medical Decision Making  78-year-old female presents hemodynamically stable and well-appearing for evaluation of bright red blood per rectum.  She has hemorrhoids and occasionally has rectal bleeding as a result of this but overnight she reports significant bleeding not just with bowel movements.  According to the daughter it was dripping out and she had to put on a feminine pad.  On exam she did have multiple nonthrombosed external hemorrhoids with a small amount of tissue which appeared consistent with a rectal prolapse with a small amount of blood on it.  This was easily reduced on exam and since then she has had no further bleeding in the emergency department.  H&H is stable and CTA of the abdomen shows no evidence of active bleeding.  Suspect bleeding 2/2 prolapse.  However, she does report still feeling dizzy and not comfortable with discharge home.  Will plan to keep for observation.    Amount and/or Complexity of Data Reviewed  Labs: ordered.  Radiology: ordered.    Risk  Prescription drug management.            Scribe Attestation:   Scribe #1: I performed the above scribed service and the documentation accurately describes the services I performed. I attest to the accuracy of the note.    Physician Attestation for Scribe: I, Sammie Roach  , reviewed documentation as scribed in my presence, which is both accurate and complete.                              Clinical Impression:  Final diagnoses:  [R53.1] Weakness  [K64.4] External hemorrhoids (Primary)  [K62.5] BRBPR (bright red blood per rectum)  [R42] Dizziness          ED Disposition Condition    Observation Stable                Sammie Roach MD  01/31/24 4325

## 2024-02-01 ENCOUNTER — TELEPHONE (OUTPATIENT)
Dept: INTERNAL MEDICINE | Facility: CLINIC | Age: 79
End: 2024-02-01
Payer: MEDICARE

## 2024-02-01 VITALS
BODY MASS INDEX: 36.64 KG/M2 | HEART RATE: 87 BPM | SYSTOLIC BLOOD PRESSURE: 135 MMHG | HEIGHT: 60 IN | OXYGEN SATURATION: 93 % | TEMPERATURE: 97 F | WEIGHT: 186.63 LBS | DIASTOLIC BLOOD PRESSURE: 63 MMHG | RESPIRATION RATE: 17 BRPM

## 2024-02-01 LAB
BASOPHILS # BLD AUTO: 0.06 K/UL (ref 0–0.2)
BASOPHILS # BLD AUTO: 0.07 K/UL (ref 0–0.2)
BASOPHILS NFR BLD: 0.5 % (ref 0–1.9)
BASOPHILS NFR BLD: 0.6 % (ref 0–1.9)
DIFFERENTIAL METHOD BLD: ABNORMAL
DIFFERENTIAL METHOD BLD: ABNORMAL
EOSINOPHIL # BLD AUTO: 0.6 K/UL (ref 0–0.5)
EOSINOPHIL # BLD AUTO: 0.6 K/UL (ref 0–0.5)
EOSINOPHIL NFR BLD: 5.1 % (ref 0–8)
EOSINOPHIL NFR BLD: 5.2 % (ref 0–8)
ERYTHROCYTE [DISTWIDTH] IN BLOOD BY AUTOMATED COUNT: 15.2 % (ref 11.5–14.5)
ERYTHROCYTE [DISTWIDTH] IN BLOOD BY AUTOMATED COUNT: 15.4 % (ref 11.5–14.5)
HCT VFR BLD AUTO: 33 % (ref 37–48.5)
HCT VFR BLD AUTO: 33.8 % (ref 37–48.5)
HGB BLD-MCNC: 10.4 G/DL (ref 12–16)
HGB BLD-MCNC: 10.8 G/DL (ref 12–16)
IMM GRANULOCYTES # BLD AUTO: 0.05 K/UL (ref 0–0.04)
IMM GRANULOCYTES # BLD AUTO: 0.07 K/UL (ref 0–0.04)
IMM GRANULOCYTES NFR BLD AUTO: 0.4 % (ref 0–0.5)
IMM GRANULOCYTES NFR BLD AUTO: 0.6 % (ref 0–0.5)
LYMPHOCYTES # BLD AUTO: 2.2 K/UL (ref 1–4.8)
LYMPHOCYTES # BLD AUTO: 3.1 K/UL (ref 1–4.8)
LYMPHOCYTES NFR BLD: 19.4 % (ref 18–48)
LYMPHOCYTES NFR BLD: 27.3 % (ref 18–48)
MCH RBC QN AUTO: 27.4 PG (ref 27–31)
MCH RBC QN AUTO: 27.8 PG (ref 27–31)
MCHC RBC AUTO-ENTMCNC: 31.5 G/DL (ref 32–36)
MCHC RBC AUTO-ENTMCNC: 32 G/DL (ref 32–36)
MCV RBC AUTO: 87 FL (ref 82–98)
MCV RBC AUTO: 87 FL (ref 82–98)
MONOCYTES # BLD AUTO: 0.8 K/UL (ref 0.3–1)
MONOCYTES # BLD AUTO: 1 K/UL (ref 0.3–1)
MONOCYTES NFR BLD: 7.3 % (ref 4–15)
MONOCYTES NFR BLD: 8.3 % (ref 4–15)
NEUTROPHILS # BLD AUTO: 6.6 K/UL (ref 1.8–7.7)
NEUTROPHILS # BLD AUTO: 7.6 K/UL (ref 1.8–7.7)
NEUTROPHILS NFR BLD: 59 % (ref 38–73)
NEUTROPHILS NFR BLD: 66.3 % (ref 38–73)
NRBC BLD-RTO: 0 /100 WBC
NRBC BLD-RTO: 0 /100 WBC
PLATELET # BLD AUTO: 233 K/UL (ref 150–450)
PLATELET # BLD AUTO: 250 K/UL (ref 150–450)
PLATELET BLD QL SMEAR: ABNORMAL
PMV BLD AUTO: 11.5 FL (ref 9.2–12.9)
PMV BLD AUTO: 12.1 FL (ref 9.2–12.9)
POCT GLUCOSE: 373 MG/DL (ref 70–110)
POCT GLUCOSE: 431 MG/DL (ref 70–110)
RBC # BLD AUTO: 3.8 M/UL (ref 4–5.4)
RBC # BLD AUTO: 3.89 M/UL (ref 4–5.4)
WBC # BLD AUTO: 11.22 K/UL (ref 3.9–12.7)
WBC # BLD AUTO: 11.53 K/UL (ref 3.9–12.7)

## 2024-02-01 PROCEDURE — 25000003 PHARM REV CODE 250: Performed by: NURSE PRACTITIONER

## 2024-02-01 PROCEDURE — 25000003 PHARM REV CODE 250: Performed by: PHYSICIAN ASSISTANT

## 2024-02-01 PROCEDURE — 85025 COMPLETE CBC W/AUTO DIFF WBC: CPT | Mod: 91 | Performed by: PHYSICIAN ASSISTANT

## 2024-02-01 PROCEDURE — 96372 THER/PROPH/DIAG INJ SC/IM: CPT | Performed by: PHYSICIAN ASSISTANT

## 2024-02-01 PROCEDURE — G0378 HOSPITAL OBSERVATION PER HR: HCPCS

## 2024-02-01 PROCEDURE — 36415 COLL VENOUS BLD VENIPUNCTURE: CPT | Mod: XB | Performed by: PHYSICIAN ASSISTANT

## 2024-02-01 RX ADMIN — ASPIRIN 81 MG: 81 TABLET, COATED ORAL at 09:02

## 2024-02-01 RX ADMIN — PANTOPRAZOLE SODIUM 40 MG: 40 TABLET, DELAYED RELEASE ORAL at 09:02

## 2024-02-01 RX ADMIN — INSULIN ASPART 5 UNITS: 100 INJECTION, SOLUTION INTRAVENOUS; SUBCUTANEOUS at 11:02

## 2024-02-01 RX ADMIN — HYDROCODONE BITARTRATE AND ACETAMINOPHEN 1 TABLET: 10; 325 TABLET ORAL at 03:02

## 2024-02-01 RX ADMIN — PREGABALIN 150 MG: 75 CAPSULE ORAL at 09:02

## 2024-02-01 RX ADMIN — ALLOPURINOL 300 MG: 300 TABLET ORAL at 09:02

## 2024-02-01 RX ADMIN — CLONIDINE HYDROCHLORIDE 0.1 MG: 0.1 TABLET ORAL at 09:02

## 2024-02-01 RX ADMIN — INSULIN DETEMIR 18 UNITS: 100 INJECTION, SOLUTION SUBCUTANEOUS at 09:02

## 2024-02-01 RX ADMIN — SERTRALINE HYDROCHLORIDE 100 MG: 25 TABLET ORAL at 09:02

## 2024-02-01 NOTE — PLAN OF CARE
LMSW met with the patient and daughter at the bedside. Patient is alert and oriented with no communication barriers. Patient denies the use of HH. Patient has DME. Patients PCP is correct on the face sheet. Patient choice pharmacy is bedside. Patient will need a lyft home.     [facility] ([place])  Initial Discharge Assessment       Primary Care Provider: Chun Zapata MD    Admission Diagnosis: External hemorrhoids [K64.4]  Dizziness [R42]  Weakness [R53.1]  BRBPR (bright red blood per rectum) [K62.5]    Admission Date: 1/31/2024  Expected Discharge Date:     Transition of Care Barriers: (P) Transportation    Payor: MEDICARE / Plan: MEDICARE PART A & B / Product Type: Government /     Extended Emergency Contact Information  Primary Emergency Contact: MaycolEsha  Address: [address] [place] [address] [facility]  Home Phone: [phone]  Relation: Daughter    Discharge Plan A: (P) Home with family, Home         [facility]  [address]  [facility] [address]  Phone: [phone] Fax: [phone]    [id number] [address] AT [facility]  [address] [place] [address]  Phone: [phone] Fax: [phone]    [facility] [place], [place] - [address]  [address]  [facility] [address]  Phone: [phone] Fax: [phone]    [facility]  [address]  [facility] [address]  Phone: [phone] Fax: [phone]      Initial Assessment (most recent)       Adult Discharge Assessment - 02/01/24 0835          Discharge Assessment    Assessment Type Discharge Planning Assessment     Confirmed/corrected address, phone number and insurance Yes     Confirmed Demographics Correct on Facesheet     Source of Information patient;family;health record     People in Home child(georges), adult     Do you  expect to return to your current living situation? Yes     Do you have help at home or someone to help you manage your care at home? Yes     Who are your caregiver(s) and their phone number(s)? Esha Severino (Daughter) [phone]     Prior to hospitilization cognitive status: Alert/Oriented;No Deficits     Current cognitive status: Alert/Oriented;No Deficits     Equipment Currently Used at Home bedside commode;walker, rolling;wheelchair;cane, straight;shower chair     Readmission within 30 days? No (P)      Patient currently being followed by outpatient case management? No (P)      Do you currently have service(s) that help you manage your care at home? No (P)      Do you take prescription medications? Yes (P)      Do you have prescription coverage? Yes (P)      Do you have any problems affording any of your prescribed medications? No (P)      Is the patient taking medications as prescribed? yes (P)      How do you get to doctors appointments? public transportation (P)      Are you on dialysis? No (P)    Patient is diabetic    Do you take coumadin? No (P)      Discharge Plan A Home with family;Home (P)      Discharge Plan discussed with: Patient;Adult children (P)      Transition of Care Barriers Transportation (P)

## 2024-02-01 NOTE — NURSING
Received patient from ED to room 316 via stretcher with her family member.Patient AAOx4.Stable on RA.Vitals taken and recorded.No complaints of pain and discomfort at this time.    Nurses Note -- 4 Eyes      1/31/2024   7:14 PM      Skin assessed during: Admit      [] No Altered Skin Integrity Present    []Prevention Measures Documented      [x] Yes- Altered Skin Integrity Present or Discovered   [x] LDA Added if Not in Epic (Describe Wound)   [x] New Altered Skin Integrity was Present on Admit and Documented in LDA   [x] Wound Image Taken  Skin tear on left buttock cheek cleaned with vashe and applied triad and foam.    Wound Care Consulted? No    Attending Nurse:  Willow Swenson RN/Staff Member:  Madisyn STEWART & Radha RN

## 2024-02-01 NOTE — PLAN OF CARE
LMSW met with patient via bedside. Sw requested patient be contacted for her follow up appointments. Patient's preferred pharmacy is bedside. Patient will need a lyft home.     [facility] ([place])  Discharge Final Note    Primary Care Provider: Chun Zapata MD    Expected Discharge Date: 2/1/2024    Final Discharge Note (most recent)       Final Note - 02/01/24 1041          Final Note    Assessment Type Final Discharge Note (P)      Anticipated Discharge Disposition Home or Self Care (P)      Hospital Resources/Appts/Education Provided Provided patient/caregiver with written discharge plan information;Appointments scheduled and added to AVS (P)         Post-Acute Status    Post-Acute Authorization Other (P)      Other Status No Post-Acute Service Needs (P)      Discharge Delays Taxi Service Set-up (P)

## 2024-02-01 NOTE — PLAN OF CARE
Discharge instructions provided along with AVS. Iv catheter removed, catheter tip intact. Patient will need a ride to her home. Telemetry discontinued.   Problem: Adult Inpatient Plan of Care  Goal: Plan of Care Review  Outcome: Met  Goal: Patient-Specific Goal (Individualized)  Outcome: Met  Goal: Absence of Hospital-Acquired Illness or Injury  Outcome: Met  Goal: Optimal Comfort and Wellbeing  Outcome: Met  Goal: Readiness for Transition of Care  Outcome: Met     Problem: Adjustment to Illness (Gastrointestinal Bleeding)  Goal: Optimal Coping with Acute Illness  Outcome: Met     Problem: Bleeding (Gastrointestinal Bleeding)  Goal: Hemostasis  Outcome: Met     Problem: Diabetes Comorbidity  Goal: Blood Glucose Level Within Targeted Range  Outcome: Met     Problem: Impaired Wound Healing  Goal: Optimal Wound Healing  Outcome: Met

## 2024-02-01 NOTE — TELEPHONE ENCOUNTER
----- Message from Clemencia Mazariegos sent at 2/1/2024 10:06 AM CST -----  Type:  Needs Medical Advice    Who Called: Caller [facility]   Would the patient rather a call back or a response via [facility]? Callback   Best Call Back Number: [phone]  Additional Information: Caller is requesting back to Pt is regards to possibly sooner available appt

## 2024-02-01 NOTE — PLAN OF CARE
MOON Message    Medicare Outpatient and Observation Notification regarding financial responsibilityGiven to patient/caregiver; Explained to patient/caregiver; Signed/date by patient/caregiverDate CRABTREE was signed2/1/2024Time CRABTREE was [id number]

## 2024-02-01 NOTE — CONSULTS
[facility] - Med Surg ([place])  Wound Care    Patient Name:  Indira Waters   MRN:  [id number]  Date: 2/1/2024  Diagnosis: BRBPR (bright red blood per rectum)    History:     Past Medical History:   Diagnosis Date    Arthritis     Back pain     Cancer     ovarian    Cervical cancer     Coronary artery disease     Depression     Diabetes mellitus     Fibromyalgia     Heart attack     History of MI (myocardial infarction)     Hyperlipidemia     Hypertension     Lupus     Stroke     slight left sided weakness       Social History     Socioeconomic History    Marital status:    Tobacco Use    Smoking status: Former     Current packs/day: 0.00     Types: Cigarettes     Quit date: 11/2020     Years since quitting: 3.2     Passive exposure: Past    Smokeless tobacco: Never   Substance and Sexual Activity    Alcohol use: Not Currently     Comment: occasionally    Drug use: Yes     Types: Hydrocodone     Comment: three times a day    Sexual activity: Not Currently   Social History Narrative    Lives with daughter. .      Social Determinants of Health     Financial Resource Strain: Low Risk  (12/16/2023)    Overall Financial Resource Strain (CARDIA)     Difficulty of Paying Living Expenses: Not hard at all   Food Insecurity: No Food Insecurity (12/16/2023)    Hunger Vital Sign     Worried About Running Out of Food in the Last Year: Never true     Ran Out of Food in the Last Year: Never true   Transportation Needs: No Transportation Needs (12/16/2023)    PRAPARE - Transportation     Lack of Transportation (Medical): No     Lack of Transportation (Non-Medical): No   Physical Activity: Insufficiently Active (12/16/2023)    Exercise Vital Sign     Days of Exercise per Week: 2 days     Minutes of Exercise per Session: 30 min   Stress: No Stress Concern Present (12/16/2023)    [employer] [place] [employer] - Occupational Stress Questionnaire     Feeling of Stress : Not at all   Social Connections: Socially  Isolated (12/16/2023)    Social Connection and Isolation Panel [NHANES]     Frequency of Communication with Friends and Family: Never     Frequency of Social Gatherings with Friends and Family: Never     Attends Anabaptism Services: Never     Active Member of Clubs or Organizations: No     Attends Club or Organization Meetings: 1 to 4 times per year     Marital Status:    Housing Stability: Unknown (12/16/2023)    Housing Stability Vital Sign     Unable to Pay for Housing in the Last Year: No     Unstable Housing in the Last Year: No       Precautions:     Allergies as of 01/31/2024 - Reviewed 01/31/2024   Allergen Reaction Noted    Bleach (sodium hypochlorite) Shortness Of Breath 09/20/2021    Nitrofurantoin macrocrystalline Anaphylaxis 04/20/2014    Lipitor [atorvastatin] Diarrhea and Rash 10/14/2021    Nsaids (non-steroidal anti-inflammatory drug)  04/20/2014    Pcn [penicillins]  04/20/2014    Toradol [ketorolac]  04/20/2014       WOC Assessment Details/Treatment     Wound care consult received for assessment of buttocks. Patient is a 78 year old female  with a past medical history of arthritis, DM, CAD, fibromyalgia, hypertension, hyperlipidemia, past MI, CVA and lupus who presents for rectal bleeding for several days. Daughter at bedside assists with history, reports her mother has been more lethargic and delayed this past day. Ms. Waters has some mild rectal pain, mostly with bowel movements. She says there is red blood on the tissue paper and in the bowl after having Bms, but she also had to use a feminine pad to help absorb the rectal bleeding. On exam she had nonthrombosed external hemorrhoids and rectal prolapse which was easily reduced. Since then she denies further bleeding.     Upon assessment noted resolved partial thickness skin loss. Patient stated that she had breakdown from recent bout if diarrhea. Recommend patient continue using triad paste to promote moist wound healing and manage  moisture. Nursing and MD team notified. Orders placed. Wound care to follow.        02/01/24 1245        Altered Skin Integrity 01/31/24 1827 Left upper;medial Buttocks #1 Moisture associated dermatitis Partial thickness tissue loss. Shallow open ulcer with a red or pink wound bed, without slough. Intact or Open/Ruptured Serum-filled blister.   Date First Assessed/Time First Assessed: 01/31/24 1827   Altered Skin Integrity Present on Admission - Did Patient arrive to the hospital with altered skin?: yes  Side: Left  Orientation: upper;medial  Location: Buttocks  Wound Number: #1  Is this inj...   Wound Image    Dressing Appearance Open to air   Appearance Pink;Dry  (dried exudate)   Tissue loss description Partial thickness   Periwound Area Intact;Dry   Wound Length (cm) 1 cm   Wound Width (cm) 1 cm   Wound Depth (cm) 0.1 cm   Wound Volume (cm^3) 0.1 cm^3   Wound Surface Area (cm^2) 1 cm^2   Care Cleansed with:;Antimicrobial agent;Applied:;Skin Barrier  (triad paste)               02/01/2024

## 2024-02-02 ENCOUNTER — PATIENT MESSAGE (OUTPATIENT)
Dept: ADMINISTRATIVE | Facility: CLINIC | Age: 79
End: 2024-02-02
Payer: MEDICARE

## 2024-02-02 ENCOUNTER — TELEPHONE (OUTPATIENT)
Dept: SURGERY | Facility: CLINIC | Age: 79
End: 2024-02-02
Payer: MEDICARE

## 2024-02-02 ENCOUNTER — PATIENT OUTREACH (OUTPATIENT)
Dept: ADMINISTRATIVE | Facility: CLINIC | Age: 79
End: 2024-02-02
Payer: MEDICARE

## 2024-02-02 NOTE — HOSPITAL COURSE
Indira Waters was admitted for rectal bleeding. CTA abd pelvis without evidence of acute GIB. Hgb 10, close to baseline and stable throughout hospital stay. On examination in the ER Ms. Waters was noted to have hemorrhoids and a prolapsed rectum which was easily reduced. Stable for discharge with referral to colorectal surgery, return precautions discussed, no further questions at discharge

## 2024-02-02 NOTE — TELEPHONE ENCOUNTER
Spoke to patient daughter to schedule her hospital f/u, she was informed that pt has to be seen with 7-14 days of discharged and she declined seeing another provider.

## 2024-02-02 NOTE — PROGRESS NOTES
C3 nurse spoke with Indira Waters  for a TCC post hospital discharge follow up call. The patient has a scheduled [facility] appointment with Chun Zapata MD  on 2/27/24 @ 1030.

## 2024-02-02 NOTE — PROGRESS NOTES
C3 nurse attempted to contact Indira Waters  for a TCC post hospital discharge follow up call. No answer. No voicemail available.The patient does not have a scheduled HOSFU appointment. Message sent to PCP staff for assistance with scheduling visit with patient.     Message sent via [facility] portal for Post Discharge Attempt.

## 2024-02-02 NOTE — ASSESSMENT & PLAN NOTE
Indira Waters presents with bleeding from rectum for several days, more profuse with Bms, some pain associated    - vitals stable  - hgb 10.7 (BL 11)  - CT AP without evidence of GIB  - On exam ER provided noted external nonthrombosed hemorrhoids and rectal prolapse, easily reduced  - continue to monitor and trend hgb: hgb remained stable and frequency/amount of bleeding reduced  - colorectal surgery referral at discharge  - stable for dc with return precautions discussed, no further questions at dc

## 2024-02-02 NOTE — DISCHARGE SUMMARY
[facility] Medicine  Discharge Summary      Patient Name: Indira Waters  MRN: [id number]  DIONE: [id number]  Patient Class: OP- Observation  Admission Date: 1/31/2024  Hospital Length of Stay: 0 days  Discharge Date and Time: 2/1/2024  3:08 PM  Attending Physician: No att. providers found   Discharging Provider: Elena Woodson PA-C  Primary Care Provider: Chun Zapata MD    Primary Care Team: Networked reference to record PCT     HPI:   Indira Waters is a 78-year-old female with a past medical history of arthritis, DM, CAD, fibromyalgia, hypertension, hyperlipidemia, past MI, CVA and lupus who presents for rectal bleeding for several days. Daughter at bedside assists with history, reports her mother has been more lethargic and delayed this past day. Ms. Waters has some mild rectal pain, mostly with bowel movements. She says there is red blood on the tissue paper and in the bowl after having Bms, but she also had to use a feminine pad to help absorb the rectal bleeding. No nausea or vomiting and no dark stool. She does have issues with constipation but says she has had soft Bms on her bowel regimen and goes daily. She does not take any anti-coagulants.     In ER: BP stable, hgb 10.7; CTA without acute GI bleed. On exam she had nonthrombosed external hemorrhoids and rectal prolapse which was easily reduced. Since then she denies further bleeding    * No surgery found *      Hospital Course:   Indira Waters was admitted for rectal bleeding. CTA abd pelvis without evidence of acute GIB. Hgb 10, close to baseline and stable throughout hospital stay. On examination in the ER Ms. Waters was noted to have hemorrhoids and a prolapsed rectum which was easily reduced. Stable for discharge with referral to colorectal surgery, return precautions discussed, no further questions at discharge     Goals of Care Treatment Preferences:  Code Status: Full Code    Health care agent:  [facility] agent number: [phone]                   Consults:     GI  * BRBPR (bright red blood per rectum)  Indira Waters presents with bleeding from rectum for several days, more profuse with Bms, some pain associated    - vitals stable  - hgb 10.7 (BL 11)  - CT AP without evidence of GIB  - On exam ER provided noted external nonthrombosed hemorrhoids and rectal prolapse, easily reduced  - continue to monitor and trend hgb: hgb remained stable and frequency/amount of bleeding reduced  - colorectal surgery referral at discharge  - stable for dc with return precautions discussed, no further questions at dc      Final Active Diagnoses:    Diagnosis Date Noted POA    PRINCIPAL PROBLEM:  BRBPR (bright red blood per rectum) [K62.5] 01/31/2024 Yes    Stage 3b chronic kidney disease [N18.32] 05/11/2023 Yes    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 pectoris [I25.118] 05/04/2022 Yes    Chronic diastolic (congestive) heart failure [I50.32] 09/15/2021 Yes    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 disease [E11.22] 09/15/2021 Yes    Essential hypertension [I10] 09/10/2021 Yes     Chronic    Chronic pain syndrome [G89.4] 05/22/2014 Yes    Gastroesophageal reflux disease without esophagitis [K21.9] 04/29/2014 Yes     Chronic    Anxiety disorder [F41.9] 04/29/2014 Yes    Fibromyalgia [M79.7] 04/29/2014 Yes     Chronic      Problems Resolved During this Admission:       Discharged Condition: stable    Disposition: Home or Self Care    Follow Up:    Patient Instructions:      Ambulatory referral/consult to Colorectal Surgery   Standing Status: Future   Referral Priority: Routine Referral Type: Consultation   Referral Reason: Specialty Services Required   Requested Specialty: Colon and Rectal Surgery   Number of Visits Requested: 1     Notify your health care provider if you experience any of the following:  temperature >100.4     Notify your health care  provider if you experience any of the following:  severe uncontrolled pain     Notify your health care provider if you experience any of the following:  redness, tenderness, or signs of infection (pain, swelling, redness, odor or green/yellow discharge around incision site)     Notify your health care provider if you experience any of the following:  worsening rash     Notify your health care provider if you experience any of the following:  difficulty breathing or increased cough     Notify your health care provider if you experience any of the following:  persistent dizziness, light-headedness, or visual disturbances     Notify your health care provider if you experience any of the following:  increased confusion or weakness     Activity as tolerated       Significant Diagnostic Studies: Labs: CBC   Recent Labs   Lab 01/31/24  1808 02/01/24  0518 02/01/24  0837   WBC 11.74 11.53 11.22   HGB 10.7* 10.4* 10.8*   HCT 33.6* 33.0* 33.8*    250 233     Radiology: CT scan:CTA abd pelvis: 1. No evidence of contrast extravasation to suggest active gastrointestinal hemorrhage.  2. Interval development of numerous ill-defined nonenhancing hypodensities in the liver, possibly related to foci of fat deposition, although additional etiologies are not excluded.  Recommend further evaluation with nonemergent MRI of the abdomen.  Pending Diagnostic Studies:       None           Medications:  Reconciled Home Medications:      Medication List        CONTINUE taking these medications      acetaminophen 500 MG tablet  Commonly known as: TYLENOL  Take 2 tablets (1,000 mg total) by mouth every 8 (eight) hours as needed for Pain.     allopurinoL 300 MG tablet  Commonly known as: ZYLOPRIM  TAKE ONE TABLET BY MOUTH EVERY DAY     amLODIPine 5 MG tablet  Commonly known as: NORVASC  Take 5 mg by mouth once daily.     aspirin 81 MG EC tablet  Commonly known as: ECOTRIN  Take 1 tablet (81 mg total) by mouth once daily.     atorvastatin  40 MG tablet  Commonly known as: LIPITOR  Take 40 mg by mouth every evening.     blood sugar diagnostic Strp  To check BG 6 times daily, to use with insurance preferred meter     blood-glucose meter kit  To check BG 6 times daily, to use with insurance preferred meter     cloNIDine 0.1 MG tablet  Commonly known as: CATAPRES  Take 1 tablet (0.1 mg total) by mouth 2 (two) times daily.     DEXCOM G7  Misc  Generic drug: blood-glucose meter,continuous  Use as directed.     DEXCOM G7 SENSOR Nicole  Generic drug: blood-glucose sensor  Change every 10 days.     dicyclomine 10 MG capsule  Commonly known as: BENTYL  Take 10 mg by mouth 3 (three) times daily.     fenofibrate micronized 134 MG Cap  Commonly known as: LOFIBRA  Take 1 capsule (134 mg total) by mouth daily with breakfast.     furosemide 40 MG tablet  Commonly known as: LASIX  TAKE ONE TABLET BY MOUTH EVERY DAY     HYDROcodone-acetaminophen  mg per tablet  Commonly known as: NORCO  Take 1 tablet by mouth every 8 (eight) hours as needed for Pain.     hydrocortisone 2.5 % rectal cream  Commonly known as: ANUSOL-HC  Procto-Med HC 2.5 % topical cream perineal applicator     insulin aspart U-100 100 unit/mL (3 mL) Inpn pen  Commonly known as: NovoLOG Flexpen U-100 Insulin  Inject 14 Units into the skin 3 (three) times daily with meals. + meal correction scale. Max 40 units     ipratropium-albuteroL  mcg/actuation inhaler  Commonly known as: CombiVENT  Inhale 1 puff into the lungs by mouth every 6 (six) hours.     lancets [facility]  To check BG 6 times daily, to use with insurance preferred meter     LEVEMIR FLEXTOUCH U100 INSULIN 100 unit/mL (3 mL) Inpn pen  Generic drug: insulin detemir U-100 (Levemir)  Inject 20 Units into the skin once daily AND 18 Units every evening.     losartan 50 MG tablet  Commonly known as: COZAAR  Take 1 tablet (50 mg total) by mouth once daily.     methocarbamoL 500 MG Tab  Commonly known as: ROBAXIN  Take 1 tablet (500 mg  "total) by mouth 3 (three) times daily as needed (muscle spasm).     metoclopramide HCl 5 MG tablet  Commonly known as: REGLAN  TAKE ONE TABLET BY MOUTH FOUR TIMES DAILY AS NEEDED FOR NAUSEA PREVENTION     metoprolol succinate 100 MG 24 hr tablet  Commonly known as: TOPROL-XL  Take 1 tablet (100 mg total) by mouth once daily.     mupirocin 2 % ointment  Commonly known as: BACTROBAN  Apply topically 3 (three) times daily as needed (skin breakdown).     NIFEdipine 30 MG (OSM) 24 hr tablet  Commonly known as: PROCARDIA-XL  Take 1 tablet (30 mg total) by mouth 2 (two) times a day.     nitroGLYCERIN 0.4 MG SL tablet  Commonly known as: NITROSTAT  Place 1 tablet (0.4 mg total) under the tongue every 5 (five) minutes as needed for Chest pain.     pantoprazole 40 MG tablet  Commonly known as: PROTONIX  Take 1 tablet (40 mg total) by mouth once daily.     pen needle, diabetic 31 gauge x 1/4" Ndle  1 each by Misc.(Non-Drug; Combo Route) route 5 (five) times daily.     pioglitazone 15 MG tablet  Commonly known as: ACTOS  Take 15 mg by mouth once daily.     pregabalin 150 MG capsule  Commonly known as: LYRICA  TAKE ONE CAPSULE BY MOUTH 3 TIMES DAILY     REPATHA SURECLICK 140 mg/mL Pnij  Generic drug: evolocumab  Inject 1 mL (140 mg total) into the skin every 14 (fourteen) days.     sertraline 100 MG tablet  Commonly known as: ZOLOFT  Take 1 tablet (100 mg total) by mouth once daily.     STOOL SOFTENER-STIMULANT LAXAT 8.6-50 mg per tablet  Generic drug: senna-docusate 8.6-50 mg  Take 1 tablet by mouth 2 (two) times daily as needed for Constipation.     teriparatide 20 mcg/dose (620mcg/2.48mL) Pnij  Inject 20 mcg into the skin once daily.     triamcinolone acetonide 0.1% 0.1 % cream  Commonly known as: KENALOG  Apply to affected areas of body twice daily as needed rash. Do not use on face, underarms, or groin.              Indwelling Lines/Drains at time of discharge:   Lines/Drains/Airways       None                   Time spent " on the discharge of patient: >35 minutes         Elena Woodson PA-C  Department of Hospital Medicine  [facility] ([place])

## 2024-02-04 DIAGNOSIS — G89.4 CHRONIC PAIN DISORDER: ICD-10-CM

## 2024-02-04 DIAGNOSIS — E08.42 DIABETIC POLYNEUROPATHY ASSOCIATED WITH DIABETES MELLITUS DUE TO UNDERLYING CONDITION: ICD-10-CM

## 2024-02-04 DIAGNOSIS — G89.4 CHRONIC PAIN SYNDROME: ICD-10-CM

## 2024-02-05 DIAGNOSIS — I25.84 CORONARY ARTERY CALCIFICATION: ICD-10-CM

## 2024-02-05 DIAGNOSIS — E78.01 FAMILIAL HYPERCHOLESTEROLEMIA: ICD-10-CM

## 2024-02-05 DIAGNOSIS — I25.10 CORONARY ARTERY CALCIFICATION: ICD-10-CM

## 2024-02-05 DIAGNOSIS — I25.118 CORONARY ARTERY DISEASE OF NATIVE ARTERY OF NATIVE HEART WITH STABLE ANGINA PECTORIS: ICD-10-CM

## 2024-02-05 RX ORDER — EVOLOCUMAB 140 MG/ML
140 INJECTION, SOLUTION SUBCUTANEOUS
Qty: 2 EACH | Refills: 11 | Status: ACTIVE | OUTPATIENT
Start: 2024-02-05

## 2024-02-05 RX ORDER — METHOCARBAMOL 500 MG/1
TABLET, FILM COATED ORAL
Qty: 90 TABLET | Refills: 2 | Status: SHIPPED | OUTPATIENT
Start: 2024-02-05 | End: 2024-04-16

## 2024-02-11 DIAGNOSIS — K31.84 GASTROPARESIS: ICD-10-CM

## 2024-02-11 DIAGNOSIS — E08.42 DIABETIC POLYNEUROPATHY ASSOCIATED WITH DIABETES MELLITUS DUE TO UNDERLYING CONDITION: ICD-10-CM

## 2024-02-11 DIAGNOSIS — G89.4 CHRONIC PAIN DISORDER: ICD-10-CM

## 2024-02-11 DIAGNOSIS — G89.4 CHRONIC PAIN SYNDROME: ICD-10-CM

## 2024-02-11 NOTE — TELEPHONE ENCOUNTER
No care due was identified.  [facility] Embedded Care Due Messages. Reference number: [id number].   2/11/2024 11:30:15 AM CST

## 2024-02-12 RX ORDER — HYDROCODONE BITARTRATE AND ACETAMINOPHEN 10; 325 MG/1; MG/1
1 TABLET ORAL EVERY 8 HOURS PRN
Qty: 90 TABLET | Refills: 0 | Status: SHIPPED | OUTPATIENT
Start: 2024-02-12 | End: 2024-03-14 | Stop reason: SDUPTHER

## 2024-02-12 RX ORDER — METOCLOPRAMIDE 5 MG/1
TABLET ORAL
Qty: 30 TABLET | Refills: 3 | Status: SHIPPED | OUTPATIENT
Start: 2024-02-12 | End: 2024-05-04 | Stop reason: SDUPTHER

## 2024-02-12 NOTE — TELEPHONE ENCOUNTER
Patient requesting refill on norco  Last office visit 1/26/2024   shows last refill on   Patient does have a pain contract on file with [facility]  Patient last UDS 1214/2023 was consistent with current therapy     CODEINE  Not Detected  Not Detected    Not Detected   Comment: INTERPRETIVE INFORMATION: Codeine, U  Positive Cutoff: 40 ng/mL  Methodology: Mass Spectrometry   MORPHINE  Not Detected  Not Detected    Not Detected   Comment: INTERPRETIVE INFORMATION:Morphine, U  Positive Cutoff: 20 ng/mL  Methodology: Mass Spectrometry   6-ACETYLMORPHINE  Not Detected  Not Detected    Not Detected   Comment: INTERPRETIVE INFORMATION:6-acetylmorphine, U  Positive Cutoff: 20 ng/mL  Methodology: Mass Spectrometry   OXYCODONE  Not Detected  Not Detected    Not Detected   Comment: INTERPRETIVE INFORMATION:Oxycodone, U  Positive Cutoff: 40 ng/mL  Methodology: Mass Spectrometry   NOROYXCODONE  Not Detected  Not Detected    Not Detected   Comment: INTERPRETIVE INFORMATION:Noroxycodone, U  Positive Cutoff: 100 ng/mL  Methodology: Mass Spectrometry   OXYMORPHONE  Not Detected  Not Detected    Not Detected   Comment: INTERPRETIVE INFORMATION:Oxymorphone, U  Positive Cutoff: 40 ng/mL  Methodology: Mass Spectrometry   NOROXYMORPHONE  Not Detected  Not Detected    Not Detected   Comment: INTERPRETIVE INFORMATION:Noroxymorphone, U  Positive Cutoff: 100 ng/mL  Methodology: Mass Spectrometry   HYDROCODONE  Present  Present    Present   Comment: INTERPRETIVE INFORMATION:Hydrocodone, U  Positive Cutoff: 40 ng/mL  Methodology: Mass Spectrometry   NORHYDROCODONE  Present  Present    Present   Comment: INTERPRETIVE INFORMATION:Norhydrocodone, U  Positive Cutoff: 100 ng/mL  Methodology: Mass Spectrometry   HYDROMORPHONE  Present  Present    Present   Comment: INTERPRETIVE INFORMATION:Hydromorphone, U  Positive Cutoff: 20 ng/mL  Methodology: Mass Spectrometry   BUPRENORPHINE  Not Detected  Not Detected    Not  Detected   Comment: INTERPRETIVE INFORMATION:Buprenorphine, U  Positive Cutoff: 5 ng/mL  Methodology: Mass Spectrometry   NORUBPRENORPHINE  Not Detected  Not Detected    Not Detected   Comment: INTERPRETIVE INFORMATION:Norbuprenorphine, U  Positive Cutoff: 20 ng/mL  Methodology: Mass Spectrometry   FENTANYL  Not Detected  Not Detected    Not Detected   Comment: INTERPRETIVE INFORMATION:Fentanyl, U  Positive Cutoff: 2 ng/mL  Methodology: Mass Spectrometry   NORFENTANYL  Not Detected  Not Detected    Not Detected   Comment: INTERPRETIVE INFORMATION:Norfentanyl, U  Positive Cutoff: 2 ng/mL  Methodology: Mass Spectrometry   MEPERIDINE METABOLITE  Not Detected  Not Detected    Not Detected   Comment: INTERPRETIVE INFORMATION:Meperidine metabolite, U  Positive Cutoff: 50 ng/mL  Methodology: Mass Spectrometry   TAPENTADOL  Not Detected  Not Detected    Not Detected   Comment: INTERPRETIVE INFORMATION:Tapentadol, U  Positive Cutoff: 100 ng/mL  Methodology: Mass Spectrometry   TAPENTADOL-O-SULF  Not Detected  Not Detected    Not Detected   Comment: INTERPRETIVE INFORMATION:Tapentadol-o-Sulf, U  Positive Cutoff: 200 ng/mL  Methodology: Mass Spectrometry   METHADONE  Negative  Not Detected    Not Detected   Comment: Presumptive negative by immunoassay. Testing by mass  spectrometry is available on request.  INTERPRETIVE INFORMATION: Methadone Screen, U  Positive Cutoff: 150 ng/mL  Methodology: Immunoassay   TRAMADOL  Negative  Not Detected    Not Detected   Comment: Presumptive negative by immunoassay. Testing by mass  spectrometry is available on request.  INTERPRETIVE INFORMATION:Tramadol Screen, U  Positive Cutoff: 100 ng/mL  Methodology: Immunoassay   AMPHETAMINE  Not Detected  Not Detected    Not Detected   Comment: INTERPRETIVE INFORMATION:Amphetamine, U  Positive Cutoff: 50 ng/mL  Methodology: Mass Spectrometry   METHAMPHETAMINE  Not Detected  Not Detected    Not Detected   Comment: INTERPRETIVE  INFORMATION:Methamphetamine, U  Positive Cutoff: 200 ng/mL  Methodology: Mass Spectrometry   MDMA- ECSTASY  Not Detected  Not Detected    Not Detected   Comment: INTERPRETIVE INFORMATION:MDMA, U  Positive Cutoff: 200 ng/mL  Methodology: Mass Spectrometry   MDA  Not Detected  Not Detected    Not Detected   Comment: INTERPRETIVE INFORMATION:MDA, U  Positive Cutoff: 200 ng/mL  Methodology: Mass Spectrometry   MDEA- Mary  Not Detected  Not Detected    Not Detected   Comment: INTERPRETIVE INFORMATION:MDEA, U  Positive Cutoff: 200 ng/mL  Methodology: Mass Spectrometry   METHYLPHENIDATE  Not Detected  Not Detected    Not Detected   Comment: INTERPRETIVE INFORMATION:Methylphenidate, U  Positive Cutoff: 100 ng/mL  Methodology: Mass Spectrometry   PHENTERMINE  Not Detected  Not Detected    Not Detected   Comment: INTERPRETIVE INFORMATION:Phentermine, U  Positive Cutoff: 100 ng/mL  Methodology: Mass Spectrometry   BENZOYLECGONINE  Negative  Not Detected    Not Detected   Comment: Presumptive negative by immunoassay. Testing by mass  spectrometry is available on request.  INTERPRETIVE INFORMATION:Cocaine Screen, U  Positive Cutoff: 150 ng/mL  Methodology: Immunoassay   ALPRAZOLAM  Not Detected  Not Detected    Not Detected   Comment: INTERPRETIVE INFORMATION:Alprazolam, U  Positive Cutoff: 40 ng/mL  Methodology: Mass Spectrometry   ALPHA-OH-ALPRAZOLAM  Not Detected  Not Detected    Not Detected   Comment: INTERPRETIVE INFORMATION:Alpha-OH-Alprazolam, U  Positive Cutoff: 20 ng/mL  Methodology: Mass Spectrometry   CLONAZEPAM  Not Detected  Not Detected    Not Detected   Comment: INTERPRETIVE INFORMATION:Clonazepam, U  Positive Cutoff: 20 ng/mL  Methodology: Mass Spectrometry   7-AMINOCLONAZEPAM  Not Detected  Not Detected    Not Detected   Comment: INTERPRETIVE INFORMATION:7-Aminoclonazepam, U  Positive Cutoff: 40 ng/mL  Methodology: Mass Spectrometry   DIAZEPAM  Not Detected  Not Detected    Not Detected   Comment: INTERPRETIVE  INFORMATION:Diazepam, U  Positive Cutoff: 50 ng/mL  Methodology: Mass Spectrometry   NORDIAZEPAM  Not Detected  Not Detected    Not Detected   Comment: INTERPRETIVE INFORMATION:Nordiazepam, U  Positive Cutoff: 50 ng/mL  Methodology: Mass Spectrometry   OXAZEPAM  Not Detected  Not Detected    Not Detected   Comment: INTERPRETIVE INFORMATION:Oxazepam, U  Positive Cutoff: 50 ng/mL  Methodology: Mass Spectrometry   TEMAZEPAM  Not Detected  Not Detected    Not Detected   Comment: INTERPRETIVE INFORMATION:Temazepam, U  Positive Cutoff: 50 ng/mL  Methodology: Mass Spectrometry   Lorazepam  Not Detected  Not Detected    Not Detected   Comment: INTERPRETIVE INFORMATION:Lorazepam, U  Positive Cutoff: 60 ng/mL  Methodology: Mass Spectrometry   MIDAZOLAM  Not Detected  Not Detected    Not Detected   Comment: INTERPRETIVE INFORMATION:Midazolam, U  Positive Cutoff: 20 ng/mL  Methodology: Mass Spectrometry   ZOLPIDEM  Not Detected  Not Detected    Not Detected   Comment: INTERPRETIVE INFORMATION:Zolpidem, U  Positive Cutoff: 20 ng/mL  Methodology: Mass Spectrometry   BARBITURATES  Negative  Not Detected    Not Detected   Comment: Presumptive negative by immunoassay. Testing by mass  spectrometry is available on request.  INTERPRETIVE INFORMATION:Barbiturates Screen, U  Positive Cutoff: 200 ng/mL  Methodology: Immunoassay   Creatinine, Urine 20.0 - 400.0 mg/dL <20.0 24.0 R 21.1 32.4 R 22.8 R 89.0 R 62.7   Comment: Creatinine <20 mg/dL is consistent with a dilute urine  specimen. Recollection to obtain a more concentrated  specimen, such as a first morning void, may be considered  if unexpected negative urine drug screening results were  obtained.   ETHYL GLUCURONIDE  Negative  Not Detected    Not Detected   Comment: Presumptive negative by immunoassay. Testing by mass  spectrometry is available on request.  INTERPRETIVE INFORMATION:Ethyl Glucuronide Screen, U  Positive Cutoff: 500 ng/mL  Methodology: Immunoassay   MARIJUANA  METABOLITE  Negative  Not Detected CM    Not Detected   Comment: Presumptive negative by immunoassay. Testing by mass  spectrometry is available on request.  INTERPRETIVE INFORMATION: THC (Cannabinoids) Screen, U  Positive Cutoff: 50 ng/mL  Methodology: Immunoassay   PCP  Negative  Not Detected    Not Detected   Comment: Presumptive negative by immunoassay. Testing by mass  spectrometry is available on request.  INTERPRETIVE INFORMATION:Phencyclidine Screen, U  Positive Cutoff: 25 ng/mL  Methodology: Immunoassay   CARISOPRODOL  Negative  Not Detected CM    Not Detected CM   Comment: Presumptive negative by immunoassay. Testing by mass  spectrometry is available on request.  INTERPRETIVE INFORMATION: Carisoprodol Screen, U  Positive Cutoff: 100 ng/mL  Methodology: Immunoassay  The carisoprodol immunoassay has cross-reactivity to  carisoprodol and meprobamate.   Naloxone  Not Detected  Not Detected    Not Detected   Comment: INTERPRETIVE INFORMATION:Naloxone, U  Positive Cutoff: 100 ng/mL  Methodology: Mass Spectrometry   Gabapentin  Not Detected  Not Detected    Not Detected   Comment: INTERPRETIVE INFORMATION:Gabapentin, U  Positive Cutoff: 3,000 ng/mL  Methodology: Mass Spectrometry   Pregabalin  Present  Present    Present   Comment: INTERPRETIVE INFORMATION:Pregabalin, U  Positive Cutoff: 3,000 ng/mL  Methodology: Mass Spectrometry   Alpha-OH-Midazolam  Not Detected  Not Detected    Not Detected   Comment: INTERPRETIVE INFORMATION:Alpha-OH-Midazolam, U  Positive Cutoff: 20 ng/mL  Methodology: Mass Spectrometry   Zolpidem Metabolite  Not Detected  Not Detected    Not Detected   Comment: INTERPRETIVE INFORMATION:Zolpidem Metabolite, U  Positive Cutoff: 100 ng/mL  Methodology: Mass Spectrometry   PAIN MANAGEMENT DRUG PANEL  See Below  See Below CM    See Below CM

## 2024-02-15 ENCOUNTER — PATIENT OUTREACH (OUTPATIENT)
Dept: ADMINISTRATIVE | Facility: HOSPITAL | Age: 79
End: 2024-02-15
Payer: MEDICARE

## 2024-02-15 ENCOUNTER — PATIENT MESSAGE (OUTPATIENT)
Dept: ADMINISTRATIVE | Facility: HOSPITAL | Age: 79
End: 2024-02-15
Payer: MEDICARE

## 2024-02-15 DIAGNOSIS — E11.9 TYPE 2 DIABETES MELLITUS WITHOUT COMPLICATION, UNSPECIFIED WHETHER LONG TERM INSULIN USE: ICD-10-CM

## 2024-02-15 DIAGNOSIS — J41.1 MUCOPURULENT CHRONIC BRONCHITIS: Primary | ICD-10-CM

## 2024-02-26 ENCOUNTER — TELEPHONE (OUTPATIENT)
Dept: INTERNAL MEDICINE | Facility: CLINIC | Age: 79
End: 2024-02-26
Payer: MEDICARE

## 2024-02-26 NOTE — TELEPHONE ENCOUNTER
Spoke with pt stating message below:    ----- Message from Chun Zapata MD sent at 2/26/2024 11:33 AM CST -----  Regarding: RE: Appointment     Ok for appt to be virtual  thanks       Selected Appointment   2/27/2024 10:30 AM (30 min)  [facility]   ESTABLISHED PATIENT - VIRTUAL   Authorization not required   [facility]   Chun Zapata MD

## 2024-02-26 NOTE — TELEPHONE ENCOUNTER
"----- Message from Chun Zapata MD sent at 2/26/2024 11:33 AM CST -----  Regarding: RE: Appointment    Ok for appt to be virtual  thanks    ----- Message -----  From: Kendra Ralph MA  Sent: 2/26/2024  10:39 AM CST  To: Chun Zapata MD  Subject: FW: Appointment                                    ----- Message -----  From: Nancie Waters  Sent: 2/26/2024   9:46 AM CST  To: [facility] Staff  Subject: Appointment                                      "Type:  Patient Call Back    Who Called:Esha(Daughter)    What is the request in detail:Pt is scheduled 2/27 and would like to know if appointment can be changed to Virtual. Please advise    Can the clinic reply by [facility]?no     Best Call Back Number:[phone]      Additional Information:                "

## 2024-02-27 ENCOUNTER — PATIENT MESSAGE (OUTPATIENT)
Dept: INTERNAL MEDICINE | Facility: CLINIC | Age: 79
End: 2024-02-27

## 2024-02-27 ENCOUNTER — TELEPHONE (OUTPATIENT)
Dept: INTERNAL MEDICINE | Facility: CLINIC | Age: 79
End: 2024-02-27
Payer: MEDICARE

## 2024-02-27 ENCOUNTER — OFFICE VISIT (OUTPATIENT)
Dept: INTERNAL MEDICINE | Facility: CLINIC | Age: 79
End: 2024-02-27
Payer: MEDICARE

## 2024-02-27 VITALS — SYSTOLIC BLOOD PRESSURE: 150 MMHG | DIASTOLIC BLOOD PRESSURE: 73 MMHG

## 2024-02-27 DIAGNOSIS — N18.4 STAGE 4 CHRONIC KIDNEY DISEASE: ICD-10-CM

## 2024-02-27 DIAGNOSIS — E11.22 TYPE 2 DIABETES MELLITUS WITH CHRONIC KIDNEY DISEASE, WITH LONG-TERM CURRENT USE OF INSULIN, UNSPECIFIED CKD STAGE: Primary | ICD-10-CM

## 2024-02-27 DIAGNOSIS — Z91.148 NONCOMPLIANCE WITH MEDICATION REGIMEN: ICD-10-CM

## 2024-02-27 DIAGNOSIS — E78.01 FAMILIAL HYPERCHOLESTEROLEMIA: Chronic | ICD-10-CM

## 2024-02-27 DIAGNOSIS — I25.10 CORONARY ARTERY CALCIFICATION: Chronic | ICD-10-CM

## 2024-02-27 DIAGNOSIS — N18.4 STAGE 4 CHRONIC KIDNEY DISEASE: Primary | ICD-10-CM

## 2024-02-27 DIAGNOSIS — R07.9 CHEST PAIN WITH HIGH RISK FOR CARDIAC ETIOLOGY: ICD-10-CM

## 2024-02-27 DIAGNOSIS — M81.0 AGE-RELATED OSTEOPOROSIS WITHOUT CURRENT PATHOLOGICAL FRACTURE: ICD-10-CM

## 2024-02-27 DIAGNOSIS — Z79.4 TYPE 2 DIABETES MELLITUS WITH CHRONIC KIDNEY DISEASE, WITH LONG-TERM CURRENT USE OF INSULIN, UNSPECIFIED CKD STAGE: Primary | ICD-10-CM

## 2024-02-27 DIAGNOSIS — Z71.89 ADVANCED CARE PLANNING/COUNSELING DISCUSSION: ICD-10-CM

## 2024-02-27 DIAGNOSIS — F41.1 GENERALIZED ANXIETY DISORDER: ICD-10-CM

## 2024-02-27 DIAGNOSIS — F32.A DEPRESSION, UNSPECIFIED DEPRESSION TYPE: Chronic | ICD-10-CM

## 2024-02-27 DIAGNOSIS — I50.32 CHRONIC DIASTOLIC (CONGESTIVE) HEART FAILURE: ICD-10-CM

## 2024-02-27 DIAGNOSIS — J41.1 MUCOPURULENT CHRONIC BRONCHITIS: ICD-10-CM

## 2024-02-27 DIAGNOSIS — I70.0 AORTIC ATHEROSCLEROSIS: ICD-10-CM

## 2024-02-27 DIAGNOSIS — N18.32 STAGE 3B CHRONIC KIDNEY DISEASE: ICD-10-CM

## 2024-02-27 DIAGNOSIS — F33.40 RECURRENT MAJOR DEPRESSIVE DISORDER, IN REMISSION: ICD-10-CM

## 2024-02-27 DIAGNOSIS — E66.01 SEVERE OBESITY: ICD-10-CM

## 2024-02-27 DIAGNOSIS — D69.0 IGA MEDIATED LEUKOCYTOCLASTIC VASCULITIS: ICD-10-CM

## 2024-02-27 DIAGNOSIS — M06.9 RHEUMATOID ARTHRITIS, INVOLVING UNSPECIFIED SITE, UNSPECIFIED WHETHER RHEUMATOID FACTOR PRESENT: ICD-10-CM

## 2024-02-27 DIAGNOSIS — I25.118 CORONARY ARTERY DISEASE OF NATIVE ARTERY OF NATIVE HEART WITH STABLE ANGINA PECTORIS: ICD-10-CM

## 2024-02-27 DIAGNOSIS — I10 ESSENTIAL HYPERTENSION: Chronic | ICD-10-CM

## 2024-02-27 DIAGNOSIS — I25.84 CORONARY ARTERY CALCIFICATION: Chronic | ICD-10-CM

## 2024-02-27 DIAGNOSIS — M32.9 SYSTEMIC LUPUS ERYTHEMATOSUS, UNSPECIFIED SLE TYPE, UNSPECIFIED ORGAN INVOLVEMENT STATUS: ICD-10-CM

## 2024-02-27 PROCEDURE — G2211 COMPLEX E/M VISIT ADD ON: HCPCS | Mod: 95,,, | Performed by: STUDENT IN AN ORGANIZED HEALTH CARE EDUCATION/TRAINING PROGRAM

## 2024-02-27 PROCEDURE — 99215 OFFICE O/P EST HI 40 MIN: CPT | Mod: 95,,, | Performed by: STUDENT IN AN ORGANIZED HEALTH CARE EDUCATION/TRAINING PROGRAM

## 2024-02-27 RX ORDER — NIFEDIPINE 30 MG/1
30 TABLET, EXTENDED RELEASE ORAL 2 TIMES DAILY
Qty: 180 TABLET | Refills: 3 | Status: SHIPPED | OUTPATIENT
Start: 2024-02-27 | End: 2025-02-26

## 2024-02-27 RX ORDER — CLONIDINE HYDROCHLORIDE 0.1 MG/1
0.1 TABLET ORAL 2 TIMES DAILY
Qty: 180 TABLET | Refills: 3 | Status: SHIPPED | OUTPATIENT
Start: 2024-02-27

## 2024-02-27 RX ORDER — LOSARTAN POTASSIUM 50 MG/1
50 TABLET ORAL DAILY
Qty: 90 TABLET | Refills: 3 | Status: SHIPPED | OUTPATIENT
Start: 2024-02-27 | End: 2025-02-26

## 2024-02-27 NOTE — PROGRESS NOTES
Transitional Care Note    Family and/or Caretaker present at visit?  Yes.  Diagnostic tests reviewed/disposition: I have reviewed all completed as well as pending diagnostic tests at the time of discharge.  Disease/illness education: yes  Home health/community services discussion/referrals: Patient does not have home health established from hospital visit.  They do not need home health.  If needed, we will set up home health for the patient.   Establishment or re-establishment of referral orders for community resources: No other necessary community resources.   Discussion with other health care providers: No discussion with other health care providers necessary.           [facility]    Subjective:      Patient ID: Indira Waters is a 78 y.o. female.    Chief Complaint: Hospital Follow Up      History was obtained from the patient and supplemented through chart review.  This pt is known to me.    HPI:    Patient is a 78 y.o. female w/pmhx including DM, HTN, HLD, CAD, fibromyalgia    Seen once in the past year.  Today was switched from in-person to virtual at last minute.    DM  Uncontrolled, overdue to see endocrinology  Has not been tracking glucoses at home  Supposed to be using CGM, but has not received. pt's daughter will check in with endocrinology for next steps  10.5 5/19/2023  Diet has changed, daughter and pt concerned repatha increased blood glucose, advised there are likely other sources, plus progression of diabetes  Still to get dexcom with endocrinology, messaged for assistance  Using carbquick instead of flour  Check labs again, pt and family wish for mid March    Severely deconditioned  Exercise encouraged, movement difficult, encouraged, using exercise trampoline    CAD\CHF HFpEF  Followed by Dr. Méndez  Cardiac Cath 5/6/2022 with significant blockages   Taking ASA 81, plavix  Not taking statin due to vasculitis thought to be 2/2 statin; repatha started  Nitro  prn    Normocytic Anemia  Family history of colon cancer in grandmother  Need iron studies, B12, folate  Did not follow up heme onc as requested  Discussed risk/benefit will follow anemia, symptoms  Monitor    Fibromyalgia   Chronic Pain Syndrome  Stable currently    Lupus? Pt reports  STEVE normal 2022  Not treated   Pt thinks it is currently flared  STEVE normal 2022  C3 and C4 normal     Osteoporosis  Did not tolerate bisphosphonate in the past  AE dicussed, On forteo with endocrinology    HTN  Controlled? uncontrolled  Procardia 30 BID, losartan 50 mg daily? (Appears to not be taking), clonidine 0.1 mg BID, toprol 100 mg daily  Borderline high, will follow    CHF   Grade II DD  Lasix 40 mg daily  Doing well    Hx of Drug induced Vasculitis  Thought to be 2/2 to atorvastatin, switched to repatha q 14 days    CKD Stage 3b/4  5 referrals 5 needed to get pt to nephro 2022, but then did not return   Avoid nsaids, nephrotoxic meds  Also with constipation    Depression  zoloft 100 mg daily   Doing better    History of cervical cancer at about age 31, never a problem later in life  S/p hyst with 1 ovary remaining  Return to gyn for at least one more exam    Wt Readings from Last 15 Encounters:   24 84.6 kg (186 lb 9.6 oz)   24 90 kg (198 lb 6.6 oz)   23 88.5 kg (195 lb)   12/15/23 88.9 kg (195 lb 15.8 oz)   23 88.5 kg (195 lb 1.7 oz)   11/15/23 88.5 kg (195 lb)   09/15/23 90.3 kg (199 lb 1.2 oz)   23 88.6 kg (195 lb 5.2 oz)   23 88.6 kg (195 lb 6.4 oz)   23 89.7 kg (197 lb 12 oz)   23 83.8 kg (184 lb 11.9 oz)   23 88 kg (194 lb)   23 88.1 kg (194 lb 3.6 oz)   23 88.1 kg (194 lb 3.2 oz)   23 81.2 kg (179 lb)      Son and   on mothers' day    Advance Care Planning   Pt wanted to clarify at end of virtual visit today that she wanted to be sure we knew and had documented that she no longer wanted to be DNR.  She wants  ""everything" done for her.  Some conversation about multiple iterations of everything and pt very clear.  Daughter participated actively in conversation as well, sometimes guiding patient.           Medical History  Past Medical History:   Diagnosis Date    Arthritis     Back pain     Cancer     ovarian    Cervical cancer     Coronary artery disease     Depression     Diabetes mellitus     Fibromyalgia     Heart attack     History of MI (myocardial infarction)     Hyperlipidemia     Hypertension     Lupus     Stroke     slight left sided weakness       Review of Systems   Constitutional:  Negative for activity change and unexpected weight change.   HENT:  Positive for trouble swallowing. Negative for hearing loss and rhinorrhea.    Eyes:  Negative for discharge and visual disturbance.   Respiratory:  Negative for chest tightness and wheezing.    Cardiovascular:  Negative for chest pain and palpitations.   Gastrointestinal:  Negative for blood in stool, constipation, diarrhea and vomiting.   Endocrine: Negative for polydipsia and polyuria.   Genitourinary:  Negative for difficulty urinating, dysuria, hematuria and menstrual problem.   Musculoskeletal:  Positive for arthralgias, joint swelling and neck pain.   Neurological:  Negative for weakness and headaches.   Psychiatric/Behavioral:  Negative for confusion and dysphoric mood.          Surgical hx, family hx, social hx   Have been reviewed      Current Outpatient Medications:     acetaminophen (TYLENOL) 500 MG tablet, Take 2 tablets (1,000 mg total) by mouth every 8 (eight) hours as needed for Pain. (Patient not taking: Reported on 2/2/2024), Disp: , Rfl: 0    allopurinoL (ZYLOPRIM) 300 MG tablet, TAKE ONE TABLET BY MOUTH EVERY DAY, Disp: 90 tablet, Rfl: 1    aspirin (ECOTRIN) 81 MG EC tablet, Take 1 tablet (81 mg total) by mouth once daily., Disp: 30 tablet, Rfl: 0    atorvastatin (LIPITOR) 40 MG tablet, Take 40 mg by mouth every evening., Disp: , Rfl:     blood " sugar diagnostic Strp, To check BG 6 times daily, to use with insurance preferred meter, Disp: 200 each, Rfl: 11    blood-glucose meter kit, To check BG 6 times daily, to use with insurance preferred meter, Disp: 1 each, Rfl: 0    blood-glucose meter,continuous (DEXCOM G7 ) Misc, Use as directed., Disp: 1 each, Rfl: 0    blood-glucose sensor (DEXCOM G7 SENSOR) Nicole, Change every 10 days., Disp: 3 each, Rfl: 11    cloNIDine (CATAPRES) 0.1 MG tablet, Take 1 tablet (0.1 mg total) by mouth 2 (two) times daily., Disp: 180 tablet, Rfl: 3    dicyclomine (BENTYL) 10 MG capsule, Take 10 mg by mouth 3 (three) times daily., Disp: , Rfl:     evolocumab (REPATHA SURECLICK) 140 mg/mL PnIj, Inject 1 mL (140 mg total) into the skin every 14 (fourteen) days., Disp: 2 each, Rfl: 11    fenofibrate micronized (LOFIBRA) 134 MG Cap, Take 1 capsule (134 mg total) by mouth daily with breakfast., Disp: 90 capsule, Rfl: 3    furosemide (LASIX) 40 MG tablet, TAKE ONE TABLET BY MOUTH EVERY DAY, Disp: 90 tablet, Rfl: 1    HYDROcodone-acetaminophen (NORCO)  mg per tablet, Take 1 tablet by mouth every 8 (eight) hours as needed for Pain., Disp: 90 tablet, Rfl: 0    hydrocortisone (ANUSOL-HC) 2.5 % rectal cream, Procto-Med HC 2.5 % topical cream perineal applicator, Disp: , Rfl:     insulin aspart U-100 (NOVOLOG FLEXPEN U-100 INSULIN) 100 unit/mL (3 mL) InPn pen, Inject 14 Units into the skin 3 (three) times daily with meals. + meal correction scale. Max 40 units, Disp: 30 mL, Rfl: 3    insulin detemir U-100, Levemir, (LEVEMIR FLEXTOUCH U100 INSULIN) 100 unit/mL (3 mL) InPn pen, Inject 20 Units into the skin once daily AND 18 Units every evening., Disp: 30 mL, Rfl: 3    ipratropium-albuteroL (COMBIVENT)  mcg/actuation inhaler, Inhale 1 puff into the lungs by mouth every 6 (six) hours., Disp: 4 g, Rfl: 0    lancets Misc, To check BG 6 times daily, to use with insurance preferred meter, Disp: 200 each, Rfl: 11    losartan (COZAAR)  "50 MG tablet, Take 1 tablet (50 mg total) by mouth once daily., Disp: 90 tablet, Rfl: 3    methocarbamoL (ROBAXIN) 500 MG Tab, TAKE ONE TABLET BY MOUTH 3 TIMES DAILY AS NEEDED FOR MUSCLE SPASMS, Disp: 90 tablet, Rfl: 2    metoclopramide HCl (REGLAN) 5 MG tablet, TAKE ONE TABLET BY MOUTH FOUR TIMES DAILY AS NEEDED FOR NAUSEA PREVENTION, Disp: 30 tablet, Rfl: 3    metoprolol succinate (TOPROL-XL) 100 MG 24 hr tablet, Take 1 tablet (100 mg total) by mouth once daily., Disp: 90 tablet, Rfl: 3    mupirocin (BACTROBAN) 2 % ointment, Apply topically 3 (three) times daily as needed (skin breakdown)., Disp: 22 g, Rfl: 3    NIFEdipine (PROCARDIA-XL) 30 MG (OSM) 24 hr tablet, Take 1 tablet (30 mg total) by mouth 2 (two) times a day., Disp: 180 tablet, Rfl: 3    nitroGLYCERIN (NITROSTAT) 0.4 MG SL tablet, Place 1 tablet (0.4 mg total) under the tongue every 5 (five) minutes as needed for Chest pain., Disp: 25 tablet, Rfl: 11    pantoprazole (PROTONIX) 40 MG tablet, Take 1 tablet (40 mg total) by mouth once daily., Disp: 30 tablet, Rfl: 0    pen needle, diabetic 31 gauge x 1/4" Ndle, 1 each by Misc.(Non-Drug; Combo Route) route 5 (five) times daily., Disp: 500 each, Rfl: 3    pioglitazone (ACTOS) 15 MG tablet, Take 15 mg by mouth once daily., Disp: , Rfl:     pregabalin (LYRICA) 150 MG capsule, TAKE ONE CAPSULE BY MOUTH 3 TIMES DAILY, Disp: 90 capsule, Rfl: 2    senna-docusate 8.6-50 mg (PERICOLACE) 8.6-50 mg per tablet, Take 1 tablet by mouth 2 (two) times daily as needed for Constipation., Disp: 60 tablet, Rfl: 0    sertraline (ZOLOFT) 100 MG tablet, Take 1 tablet (100 mg total) by mouth once daily., Disp: 90 tablet, Rfl: 3    teriparatide 20 mcg/dose (620mcg/2.48mL) PnIj, Inject 20 mcg into the skin once daily., Disp: 2.48 mL, Rfl: 5    triamcinolone acetonide 0.1% (KENALOG) 0.1 % cream, Apply to affected areas of body twice daily as needed rash. Do not use on face, underarms, or groin., Disp: 454 g, Rfl: 0    Current " Facility-Administered Medications:     capsaicin-skin cleanser patch 1 patch, 1 patch, Topical (Top), 1 time in Clinic/HOD, Elvira Hylton NP    Objective:        There is no height or weight on file to calculate BMI.  Vitals:    02/27/24 1711   PainSc: 0-No pain           Physical Exam  Vitals and nursing note reviewed.   Constitutional:       General: She is not in acute distress.     Appearance: Normal appearance. She is not ill-appearing.   HENT:      Head: Normocephalic and atraumatic.   Eyes:      General: No scleral icterus.  Pulmonary:      Effort: Pulmonary effort is normal.   Musculoskeletal:         General: No deformity.   Neurological:      Mental Status: She is alert and oriented to person, place, and time.   Psychiatric:         Behavior: Behavior normal.             Lab Results   Component Value Date    WBC 11.22 02/01/2024    HGB 10.8 (L) 02/01/2024    HCT 33.8 (L) 02/01/2024     02/01/2024    CHOL 99 (L) 10/26/2023    TRIG 220 (H) 10/26/2023    HDL 32 (L) 10/26/2023    ALT 16 01/31/2024    AST 20 01/31/2024     01/31/2024    K 4.0 01/31/2024     01/31/2024    CREATININE 1.2 01/31/2024    BUN 10 01/31/2024    CO2 23 01/31/2024    TSH 2.432 09/30/2022    INR 1.0 01/31/2024    HGBA1C 10.4 (H) 12/14/2023       The ASCVD Risk score (Annamarie DK, et al., 2019) failed to calculate for the following reasons:    The valid total cholesterol range is 130 to 320 mg/dL    Repatha    (Imaging have been independently reviewed)    Reviewed heart catch from 5/2022      Assessment:         1. Type 2 diabetes mellitus with chronic kidney disease, with long-term current use of insulin, unspecified CKD stage    2. Stage 3b chronic kidney disease    3. Generalized anxiety disorder    4. Depression, unspecified depression type    5. Familial hypercholesterolemia    6. Essential hypertension    7. 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 pectoris    8. Mucopurulent  chronic bronchitis    9. Systemic lupus erythematosus, unspecified SLE type, unspecified organ involvement status    10. Noncompliance with medication regimen    11. Age-related osteoporosis without current pathological fracture    12. Advanced care planning/counseling discussion    13. Stage 4 chronic kidney disease    14. Severe obesity    15. Rheumatoid arthritis, involving unspecified site, unspecified whether rheumatoid factor present    16. IgA mediated leukocytoclastic vasculitis    17. Recurrent major depressive disorder, in remission    18. Aortic atherosclerosis    19. Chest pain with high risk for cardiac etiology    20. Chronic diastolic (congestive) heart failure    21. Coronary artery calcification                  Plan:     Indira was seen today for hospital follow up.    Diagnoses and all orders for this visit:    Type 2 diabetes mellitus with chronic kidney disease, with long-term current use of insulin, unspecified CKD stage  -     Hemoglobin A1C; Future  -     Comprehensive Metabolic Panel; Future    Stage 3b chronic kidney disease  -     CBC Without Differential; Future    Generalized anxiety disorder    Depression, unspecified depression type    Familial hypercholesterolemia    Essential hypertension  -     cloNIDine (CATAPRES) 0.1 MG tablet; Take 1 tablet (0.1 mg total) by mouth 2 (two) times daily.  -     losartan (COZAAR) 50 MG tablet; Take 1 tablet (50 mg total) by mouth once daily.  -     NIFEdipine (PROCARDIA-XL) 30 MG (OSM) 24 hr tablet; Take 1 tablet (30 mg total) by mouth 2 (two) times a day.    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 pectoris    Mucopurulent chronic bronchitis    Systemic lupus erythematosus, unspecified SLE type, unspecified organ involvement status    Noncompliance with medication regimen    Age-related osteoporosis without current pathological fracture    Advanced care planning/counseling discussion    Stage 4 chronic kidney  disease  -     losartan (COZAAR) 50 MG tablet; Take 1 tablet (50 mg total) by mouth once daily.    Severe obesity    Rheumatoid arthritis, involving unspecified site, unspecified whether rheumatoid factor present    IgA mediated leukocytoclastic vasculitis    Recurrent major depressive disorder, in remission    Aortic atherosclerosis    Chest pain with high risk for cardiac etiology    Chronic diastolic (congestive) heart failure    Coronary artery calcification          Follow up in about 3 months (around 5/27/2024). or sooner prn      All of your core healthy metrics are met.    40 min were used in chart review, evaluation and counseling of patient, documentation and review of results on same day of service    Visit today is associated with current or anticipated ongoing medical care related to this patient's single serious condition/complex condition of diabetes, depression, polyathralgia, CAD, sob, debility. The patient will return to see me as these issues will be followed longitudinally.      All medications were reviewed including potential side effects and risks/benefits.  Pt was counseled to call back if anything worsens or if questions arise.    Chun Zapata MD  Family Medicine  [facility]  [address] [place] [address]  [address] [place] [id number]  Phone [phone]  Fax [phone]

## 2024-02-27 NOTE — ACP (ADVANCE CARE PLANNING)
"  Does want to be fully resuscitated.  Wants all efforts towards sustaining life.  "All of the above."    Clear on wanting tube feeds.  Wants full code.    JL  "

## 2024-02-29 ENCOUNTER — TELEPHONE (OUTPATIENT)
Dept: ENDOCRINOLOGY | Facility: CLINIC | Age: 79
End: 2024-02-29
Payer: MEDICARE

## 2024-02-29 DIAGNOSIS — E11.65 TYPE 2 DIABETES MELLITUS WITH HYPERGLYCEMIA, WITH LONG-TERM CURRENT USE OF INSULIN: Chronic | ICD-10-CM

## 2024-02-29 DIAGNOSIS — Z79.4 TYPE 2 DIABETES MELLITUS WITH HYPERGLYCEMIA, WITH LONG-TERM CURRENT USE OF INSULIN: Chronic | ICD-10-CM

## 2024-02-29 DIAGNOSIS — R23.8 SKIN BREAKDOWN: ICD-10-CM

## 2024-02-29 RX ORDER — MUPIROCIN 20 MG/G
OINTMENT TOPICAL 3 TIMES DAILY PRN
Qty: 22 G | Refills: 3 | Status: SHIPPED | OUTPATIENT
Start: 2024-02-29

## 2024-02-29 RX ORDER — BLOOD-GLUCOSE SENSOR
EACH MISCELLANEOUS
Qty: 3 EACH | Refills: 11 | Status: SHIPPED | OUTPATIENT
Start: 2024-02-29

## 2024-03-04 DIAGNOSIS — G89.4 CHRONIC PAIN DISORDER: ICD-10-CM

## 2024-03-05 NOTE — TELEPHONE ENCOUNTER
Patient requesting refill on lyrica  Last office visit 1/26/2024   shows last refill on 02/05/2024  Patient does have a pain contract on file with [facility]  Patient last UDS 2/14/2024 was consistent with current therapy     CODEINE  Not Detected  Not Detected    Not Detected   Comment: INTERPRETIVE INFORMATION: Codeine, U  Positive Cutoff: 40 ng/mL  Methodology: Mass Spectrometry   MORPHINE  Not Detected  Not Detected    Not Detected   Comment: INTERPRETIVE INFORMATION:Morphine, U  Positive Cutoff: 20 ng/mL  Methodology: Mass Spectrometry   6-ACETYLMORPHINE  Not Detected  Not Detected    Not Detected   Comment: INTERPRETIVE INFORMATION:6-acetylmorphine, U  Positive Cutoff: 20 ng/mL  Methodology: Mass Spectrometry   OXYCODONE  Not Detected  Not Detected    Not Detected   Comment: INTERPRETIVE INFORMATION:Oxycodone, U  Positive Cutoff: 40 ng/mL  Methodology: Mass Spectrometry   NOROYXCODONE  Not Detected  Not Detected    Not Detected   Comment: INTERPRETIVE INFORMATION:Noroxycodone, U  Positive Cutoff: 100 ng/mL  Methodology: Mass Spectrometry   OXYMORPHONE  Not Detected  Not Detected    Not Detected   Comment: INTERPRETIVE INFORMATION:Oxymorphone, U  Positive Cutoff: 40 ng/mL  Methodology: Mass Spectrometry   NOROXYMORPHONE  Not Detected  Not Detected    Not Detected   Comment: INTERPRETIVE INFORMATION:Noroxymorphone, U  Positive Cutoff: 100 ng/mL  Methodology: Mass Spectrometry   HYDROCODONE  Present  Present    Present   Comment: INTERPRETIVE INFORMATION:Hydrocodone, U  Positive Cutoff: 40 ng/mL  Methodology: Mass Spectrometry   NORHYDROCODONE  Present  Present    Present   Comment: INTERPRETIVE INFORMATION:Norhydrocodone, U  Positive Cutoff: 100 ng/mL  Methodology: Mass Spectrometry   HYDROMORPHONE  Present  Present    Present   Comment: INTERPRETIVE INFORMATION:Hydromorphone, U  Positive Cutoff: 20 ng/mL  Methodology: Mass Spectrometry   BUPRENORPHINE  Not Detected  Not  Detected    Not Detected   Comment: INTERPRETIVE INFORMATION:Buprenorphine, U  Positive Cutoff: 5 ng/mL  Methodology: Mass Spectrometry   NORUBPRENORPHINE  Not Detected  Not Detected    Not Detected   Comment: INTERPRETIVE INFORMATION:Norbuprenorphine, U  Positive Cutoff: 20 ng/mL  Methodology: Mass Spectrometry   FENTANYL  Not Detected  Not Detected    Not Detected   Comment: INTERPRETIVE INFORMATION:Fentanyl, U  Positive Cutoff: 2 ng/mL  Methodology: Mass Spectrometry   NORFENTANYL  Not Detected  Not Detected    Not Detected   Comment: INTERPRETIVE INFORMATION:Norfentanyl, U  Positive Cutoff: 2 ng/mL  Methodology: Mass Spectrometry   MEPERIDINE METABOLITE  Not Detected  Not Detected    Not Detected   Comment: INTERPRETIVE INFORMATION:Meperidine metabolite, U  Positive Cutoff: 50 ng/mL  Methodology: Mass Spectrometry   TAPENTADOL  Not Detected  Not Detected    Not Detected   Comment: INTERPRETIVE INFORMATION:Tapentadol, U  Positive Cutoff: 100 ng/mL  Methodology: Mass Spectrometry   TAPENTADOL-O-SULF  Not Detected  Not Detected    Not Detected   Comment: INTERPRETIVE INFORMATION:Tapentadol-o-Sulf, U  Positive Cutoff: 200 ng/mL  Methodology: Mass Spectrometry   METHADONE  Negative  Not Detected    Not Detected   Comment: Presumptive negative by immunoassay. Testing by mass  spectrometry is available on request.  INTERPRETIVE INFORMATION: Methadone Screen, U  Positive Cutoff: 150 ng/mL  Methodology: Immunoassay   TRAMADOL  Negative  Not Detected    Not Detected   Comment: Presumptive negative by immunoassay. Testing by mass  spectrometry is available on request.  INTERPRETIVE INFORMATION:Tramadol Screen, U  Positive Cutoff: 100 ng/mL  Methodology: Immunoassay   AMPHETAMINE  Not Detected  Not Detected    Not Detected   Comment: INTERPRETIVE INFORMATION:Amphetamine, U  Positive Cutoff: 50 ng/mL  Methodology: Mass Spectrometry   METHAMPHETAMINE  Not Detected  Not Detected    Not Detected   Comment: INTERPRETIVE  INFORMATION:Methamphetamine, U  Positive Cutoff: 200 ng/mL  Methodology: Mass Spectrometry   MDMA- ECSTASY  Not Detected  Not Detected    Not Detected   Comment: INTERPRETIVE INFORMATION:MDMA, U  Positive Cutoff: 200 ng/mL  Methodology: Mass Spectrometry   MDA  Not Detected  Not Detected    Not Detected   Comment: INTERPRETIVE INFORMATION:MDA, U  Positive Cutoff: 200 ng/mL  Methodology: Mass Spectrometry   MDEA- Mary  Not Detected  Not Detected    Not Detected   Comment: INTERPRETIVE INFORMATION:MDEA, U  Positive Cutoff: 200 ng/mL  Methodology: Mass Spectrometry   METHYLPHENIDATE  Not Detected  Not Detected    Not Detected   Comment: INTERPRETIVE INFORMATION:Methylphenidate, U  Positive Cutoff: 100 ng/mL  Methodology: Mass Spectrometry   PHENTERMINE  Not Detected  Not Detected    Not Detected   Comment: INTERPRETIVE INFORMATION:Phentermine, U  Positive Cutoff: 100 ng/mL  Methodology: Mass Spectrometry   BENZOYLECGONINE  Negative  Not Detected    Not Detected   Comment: Presumptive negative by immunoassay. Testing by mass  spectrometry is available on request.  INTERPRETIVE INFORMATION:Cocaine Screen, U  Positive Cutoff: 150 ng/mL  Methodology: Immunoassay   ALPRAZOLAM  Not Detected  Not Detected    Not Detected   Comment: INTERPRETIVE INFORMATION:Alprazolam, U  Positive Cutoff: 40 ng/mL  Methodology: Mass Spectrometry   ALPHA-OH-ALPRAZOLAM  Not Detected  Not Detected    Not Detected   Comment: INTERPRETIVE INFORMATION:Alpha-OH-Alprazolam, U  Positive Cutoff: 20 ng/mL  Methodology: Mass Spectrometry   CLONAZEPAM  Not Detected  Not Detected    Not Detected   Comment: INTERPRETIVE INFORMATION:Clonazepam, U  Positive Cutoff: 20 ng/mL  Methodology: Mass Spectrometry   7-AMINOCLONAZEPAM  Not Detected  Not Detected    Not Detected   Comment: INTERPRETIVE INFORMATION:7-Aminoclonazepam, U  Positive Cutoff: 40 ng/mL  Methodology: Mass Spectrometry   DIAZEPAM  Not Detected  Not Detected    Not Detected   Comment: INTERPRETIVE  INFORMATION:Diazepam, U  Positive Cutoff: 50 ng/mL  Methodology: Mass Spectrometry   NORDIAZEPAM  Not Detected  Not Detected    Not Detected   Comment: INTERPRETIVE INFORMATION:Nordiazepam, U  Positive Cutoff: 50 ng/mL  Methodology: Mass Spectrometry   OXAZEPAM  Not Detected  Not Detected    Not Detected   Comment: INTERPRETIVE INFORMATION:Oxazepam, U  Positive Cutoff: 50 ng/mL  Methodology: Mass Spectrometry   TEMAZEPAM  Not Detected  Not Detected    Not Detected

## 2024-03-06 DIAGNOSIS — G89.4 CHRONIC PAIN DISORDER: ICD-10-CM

## 2024-03-06 RX ORDER — PREGABALIN 150 MG/1
CAPSULE ORAL
Qty: 90 CAPSULE | Refills: 2 | Status: SHIPPED | OUTPATIENT
Start: 2024-03-06 | End: 2024-03-14

## 2024-03-06 RX ORDER — PREGABALIN 150 MG/1
CAPSULE ORAL
Qty: 90 CAPSULE | Refills: 2 | Status: SHIPPED | OUTPATIENT
Start: 2024-03-06 | End: 2024-03-06 | Stop reason: SDUPTHER

## 2024-03-06 NOTE — TELEPHONE ENCOUNTER
----- Message from Valeria Yanez sent at 3/6/2024 11:12 AM CST -----  Regarding: Medication  Refill  Request                    Reply in [facility]: NO      Please refill the medication  listed below. Please call the patient  [phone] (H)    Medication     pregabalin (LYRICA) 150 MG capsule   /   30 Day Supply       Preferred Pharmacy:      [place] [address]   Phone: [phone]  Fax: [phone]

## 2024-03-06 NOTE — TELEPHONE ENCOUNTER
----- Message from Valeria Yanez sent at 3/6/2024 11:12 AM CST -----  Regarding: Medication  Refill  Request                    Reply in [facility]: NO      Please refill the medication  listed below. Please call the patient  [phone] (T)    Medication     pregabalin (LYRICA) 150 MG capsule   /   30 Day Supply       Preferred Pharmacy:      [place] [address]   Phone: [phone]  Fax: [phone]

## 2024-03-06 NOTE — TELEPHONE ENCOUNTER
----- Message from Jacquelin Adan sent at 3/5/2024 10:44 AM CST -----  Regarding: refill  Type:  RX Refill Request    Who Called: Maikel Florentino    Refill or New Rx:refill    RX Name and Strength:pregabalin (LYRICA) 150 MG capsule    How is the patient currently taking it? (ex. 1XDay):    Is this a 30 day or 90 day RX:    Preferred Pharmacy with phone number:[place] [address]   Phone: [phone]  Fax: [phone]          [facility]    Ordering Provider:    Would the patient rather a call back or a response via [facility]? call    Best Call Back Number: [phone]    Additional Information:

## 2024-03-12 RX ORDER — FUROSEMIDE 40 MG/1
40 TABLET ORAL
Qty: 90 TABLET | Refills: 1 | Status: SHIPPED | OUTPATIENT
Start: 2024-03-12 | End: 2024-06-05

## 2024-03-12 NOTE — TELEPHONE ENCOUNTER
No care due was identified.  [facility] Embedded Care Due Messages. Reference number: [id number].   3/12/2024 8:03:24 AM CDT

## 2024-03-12 NOTE — TELEPHONE ENCOUNTER
Refill Routing Note   Medication(s) are not appropriate for processing by [facility] for the following reason(s):        Required vitals abnormal    [facility] action(s):  Defer               Appointments  past 12m or future 3m with PCP    Date Provider   Last Visit   2/27/2024 Chun Zapata MD   Next Visit   6/20/2024 Chun Zapata MD   ED visits in past 90 days: 0        Note composed:12:38 PM 03/12/2024

## 2024-03-13 DIAGNOSIS — M17.0 PRIMARY OSTEOARTHRITIS OF BOTH KNEES: Primary | ICD-10-CM

## 2024-03-13 DIAGNOSIS — G89.4 CHRONIC PAIN SYNDROME: ICD-10-CM

## 2024-03-13 DIAGNOSIS — E08.42 DIABETIC POLYNEUROPATHY ASSOCIATED WITH DIABETES MELLITUS DUE TO UNDERLYING CONDITION: ICD-10-CM

## 2024-03-13 DIAGNOSIS — G89.29 CHRONIC PAIN OF LEFT KNEE: ICD-10-CM

## 2024-03-13 DIAGNOSIS — M25.562 CHRONIC PAIN OF LEFT KNEE: ICD-10-CM

## 2024-03-13 DIAGNOSIS — G89.4 CHRONIC PAIN DISORDER: ICD-10-CM

## 2024-03-13 RX ORDER — HYDROCODONE BITARTRATE AND ACETAMINOPHEN 10; 325 MG/1; MG/1
1 TABLET ORAL EVERY 8 HOURS PRN
Qty: 90 TABLET | Refills: 0 | Status: CANCELLED | OUTPATIENT
Start: 2024-03-13 | End: 2024-04-12

## 2024-03-13 NOTE — TELEPHONE ENCOUNTER
Patient requesting refill on norco  Last office visit 1/26/2024   shows last refill on 2/14/2024  Patient does have a pain contract on file with [facility] department  Patient last UDS 12/14/2023 was consistent with current therapy     CODEINE  Not Detected  Not Detected    Not Detected   Comment: INTERPRETIVE INFORMATION: Codeine, U  Positive Cutoff: 40 ng/mL  Methodology: Mass Spectrometry   MORPHINE  Not Detected  Not Detected    Not Detected   Comment: INTERPRETIVE INFORMATION:Morphine, U  Positive Cutoff: 20 ng/mL  Methodology: Mass Spectrometry   6-ACETYLMORPHINE  Not Detected  Not Detected    Not Detected   Comment: INTERPRETIVE INFORMATION:6-acetylmorphine, U  Positive Cutoff: 20 ng/mL  Methodology: Mass Spectrometry   OXYCODONE  Not Detected  Not Detected    Not Detected   Comment: INTERPRETIVE INFORMATION:Oxycodone, U  Positive Cutoff: 40 ng/mL  Methodology: Mass Spectrometry   NOROYXCODONE  Not Detected  Not Detected    Not Detected   Comment: INTERPRETIVE INFORMATION:Noroxycodone, U  Positive Cutoff: 100 ng/mL  Methodology: Mass Spectrometry   OXYMORPHONE  Not Detected  Not Detected    Not Detected   Comment: INTERPRETIVE INFORMATION:Oxymorphone, U  Positive Cutoff: 40 ng/mL  Methodology: Mass Spectrometry   NOROXYMORPHONE  Not Detected  Not Detected    Not Detected   Comment: INTERPRETIVE INFORMATION:Noroxymorphone, U  Positive Cutoff: 100 ng/mL  Methodology: Mass Spectrometry   HYDROCODONE  Present  Present    Present   Comment: INTERPRETIVE INFORMATION:Hydrocodone, U  Positive Cutoff: 40 ng/mL  Methodology: Mass Spectrometry   NORHYDROCODONE  Present  Present    Present   Comment: INTERPRETIVE INFORMATION:Norhydrocodone, U  Positive Cutoff: 100 ng/mL  Methodology: Mass Spectrometry   HYDROMORPHONE  Present  Present    Present   Comment: INTERPRETIVE INFORMATION:Hydromorphone, U  Positive Cutoff: 20 ng/mL  Methodology: Mass Spectrometry   BUPRENORPHINE  Not Detected  Not  Detected    Not Detected   Comment: INTERPRETIVE INFORMATION:Buprenorphine, U  Positive Cutoff: 5 ng/mL  Methodology: Mass Spectrometry   NORUBPRENORPHINE  Not Detected  Not Detected    Not Detected   Comment: INTERPRETIVE INFORMATION:Norbuprenorphine, U  Positive Cutoff: 20 ng/mL  Methodology: Mass Spectrometry   FENTANYL  Not Detected  Not Detected    Not Detected   Comment: INTERPRETIVE INFORMATION:Fentanyl, U  Positive Cutoff: 2 ng/mL  Methodology: Mass Spectrometry   NORFENTANYL  Not Detected  Not Detected    Not Detected   Comment: INTERPRETIVE INFORMATION:Norfentanyl, U  Positive Cutoff: 2 ng/mL  Methodology: Mass Spectrometry   MEPERIDINE METABOLITE  Not Detected  Not Detected    Not Detected   Comment: INTERPRETIVE INFORMATION:Meperidine metabolite, U  Positive Cutoff: 50 ng/mL  Methodology: Mass Spectrometry   TAPENTADOL  Not Detected  Not Detected    Not Detected   Comment: INTERPRETIVE INFORMATION:Tapentadol, U  Positive Cutoff: 100 ng/mL  Methodology: Mass Spectrometry   TAPENTADOL-O-SULF  Not Detected  Not Detected    Not Detected   Comment: INTERPRETIVE INFORMATION:Tapentadol-o-Sulf, U  Positive Cutoff: 200 ng/mL  Methodology: Mass Spectrometry   METHADONE  Negative  Not Detected    Not Detected   Comment: Presumptive negative by immunoassay. Testing by mass  spectrometry is available on request.  INTERPRETIVE INFORMATION: Methadone Screen, U  Positive Cutoff: 150 ng/mL  Methodology: Immunoassay   TRAMADOL  Negative  Not Detected    Not Detected   Comment: Presumptive negative by immunoassay. Testing by mass  spectrometry is available on request.  INTERPRETIVE INFORMATION:Tramadol Screen, U  Positive Cutoff: 100 ng/mL  Methodology: Immunoassay   AMPHETAMINE  Not Detected  Not Detected    Not Detected   Comment: INTERPRETIVE INFORMATION:Amphetamine, U  Positive Cutoff: 50 ng/mL  Methodology: Mass Spectrometry   METHAMPHETAMINE  Not Detected  Not Detected    Not Detected   Comment: INTERPRETIVE  INFORMATION:Methamphetamine, U  Positive Cutoff: 200 ng/mL  Methodology: Mass Spectrometry   MDMA- ECSTASY  Not Detected  Not Detected    Not Detected   Comment: INTERPRETIVE INFORMATION:MDMA, U  Positive Cutoff: 200 ng/mL  Methodology: Mass Spectrometry   MDA  Not Detected  Not Detected    Not Detected   Comment: INTERPRETIVE INFORMATION:MDA, U  Positive Cutoff: 200 ng/mL  Methodology: Mass Spectrometry   MDEA- Mary  Not Detected  Not Detected    Not Detected   Comment: INTERPRETIVE INFORMATION:MDEA, U

## 2024-03-14 ENCOUNTER — OFFICE VISIT (OUTPATIENT)
Dept: PAIN MEDICINE | Facility: CLINIC | Age: 79
End: 2024-03-14
Attending: ANESTHESIOLOGY
Payer: MEDICARE

## 2024-03-14 ENCOUNTER — HOSPITAL ENCOUNTER (OUTPATIENT)
Dept: RADIOLOGY | Facility: OTHER | Age: 79
Discharge: HOME | End: 2024-03-14
Attending: STUDENT IN AN ORGANIZED HEALTH CARE EDUCATION/TRAINING PROGRAM
Payer: MEDICARE

## 2024-03-14 VITALS
BODY MASS INDEX: 36.43 KG/M2 | DIASTOLIC BLOOD PRESSURE: 69 MMHG | TEMPERATURE: 98 F | SYSTOLIC BLOOD PRESSURE: 129 MMHG | WEIGHT: 186.5 LBS | HEART RATE: 84 BPM

## 2024-03-14 DIAGNOSIS — N18.4 STAGE 4 CHRONIC KIDNEY DISEASE: ICD-10-CM

## 2024-03-14 DIAGNOSIS — M47.816 LUMBAR SPONDYLOSIS: ICD-10-CM

## 2024-03-14 DIAGNOSIS — M17.12 PRIMARY OSTEOARTHRITIS OF LEFT KNEE: ICD-10-CM

## 2024-03-14 DIAGNOSIS — M51.36 DDD (DEGENERATIVE DISC DISEASE), LUMBAR: ICD-10-CM

## 2024-03-14 DIAGNOSIS — E11.40 PAINFUL DIABETIC NEUROPATHY: Primary | ICD-10-CM

## 2024-03-14 DIAGNOSIS — G89.4 CHRONIC PAIN SYNDROME: ICD-10-CM

## 2024-03-14 DIAGNOSIS — N18.32 STAGE 3B CHRONIC KIDNEY DISEASE: ICD-10-CM

## 2024-03-14 PROCEDURE — 99214 OFFICE O/P EST MOD 30 MIN: CPT | Mod: S$PBB,GC,, | Performed by: ANESTHESIOLOGY

## 2024-03-14 PROCEDURE — 99999 PR PBB SHADOW E&M-EST. PATIENT-LVL V: CPT | Mod: PBBFAC,,, | Performed by: ANESTHESIOLOGY

## 2024-03-14 PROCEDURE — 76770 US EXAM ABDO BACK WALL COMP: CPT | Mod: TC

## 2024-03-14 PROCEDURE — 76770 US EXAM ABDO BACK WALL COMP: CPT | Mod: 26,,, | Performed by: RADIOLOGY

## 2024-03-14 PROCEDURE — 73560 X-RAY EXAM OF KNEE 1 OR 2: CPT | Mod: TC,FY,LT

## 2024-03-14 PROCEDURE — 99215 OFFICE O/P EST HI 40 MIN: CPT | Mod: PBBFAC,25 | Performed by: ANESTHESIOLOGY

## 2024-03-14 PROCEDURE — 73560 X-RAY EXAM OF KNEE 1 OR 2: CPT | Mod: 26,LT,, | Performed by: RADIOLOGY

## 2024-03-14 RX ORDER — PREGABALIN 150 MG/1
150 CAPSULE ORAL 4 TIMES DAILY
Qty: 120 CAPSULE | Refills: 2 | Status: SHIPPED | OUTPATIENT
Start: 2024-03-14 | End: 2024-06-12

## 2024-03-14 RX ORDER — HYDROCODONE BITARTRATE AND ACETAMINOPHEN 10; 325 MG/1; MG/1
1 TABLET ORAL EVERY 8 HOURS PRN
Qty: 90 TABLET | Refills: 0 | Status: SHIPPED | OUTPATIENT
Start: 2024-03-14 | End: 2024-04-10 | Stop reason: SDUPTHER

## 2024-03-14 NOTE — PROGRESS NOTES
Chronic patient Established Note (Follow up visit)    Interval History 3/14/2024:  Indira Waters returns today for f/u of her painful neuropathy in the feet. She was last seen in January for this complaint. Qutenza patches were administered at that time. We also continued medication management with Robaxin, Lyrica, and Norco. She reports incredible relief of neuropathic symptoms with qutenza injections. Significantly improves walking ability. Primary complaint today is left knee pain. Started several years ago. Has been mostly intermittent over the years, but has been more constant and severe over the past 2 months. Localized over medial joint line. No falls. Pain today is an 8/10. Continues Norco 10/325 mg TID, consistently. No adverse effects. Feels like medications continue to help with functional mobility and ADL ability..      Interval History 1/26/2024:  The patient returns to clinic today for foot pain. She continues to report neuropathic pain into her feet. She describes this as burning in nature. She does find benefit with Qutenza patches. She denies any rash or increased pain after the patch application. She continues to take Lyrica, Robaxin, and Norco with benefit. She denies any adverse effects. She denies any other health changes. Her pain today is 8/10.    Interval History 12/14/2023:  The patient returns to clinic today for follow up of back and leg pain. She continues to report low back pain that radiates into both legs. She continues to report bilateral neuropathic pain into the feet. She states that today is a bad day. She is having more left foot and toe pain. Her pain is worse with prolonged standing and walking. She does have benefit with Qutenza patches. She is taking Lyrica, Robaxin, and Norco with benefit and without adverse effects. She denies other health changes. Her pain today is 8/10.    Interval History 10/11/2023:  The patient returns to clinic today for follow up of bilateral  foot pain. She continues to report neuropathic pain to her feet. Her pain is worse with standing and walking. She does have benefit with Qutenza patches. She also takes Lyrica, Robaxin, and Norco. She denies any adverse effects. She denies any other health changes. Her pain today is 8/10.    Interval History 9/15/2023:  The patient returns to clinic today for follow up of back and leg pain. She did have benefit with Qutenza patches. She continues to report low back pain with intermittent radicular leg pain. She continues to report neuropathic pain into her feet. She continues to take Lyrica and Robaxin with benefit. She also takes Norco as needed with benefit. She denies any adverse effects. She denies any other health changes. Her pain today is 9/10.    Interval History 6/27/2023:  The patient returns to clinic today for follow up of pain. She continues to report nerve pain to her feet bilaterally. This pain is worse with standing and walking. She is taking Lyrica and Robaxin. She also takes Norco as needed with benefit and without adverse effects. She denies any other health changes. Her pain today is 9/10.    Interval History 6/16/2023:  The patient returns to clinic today for follow up of back and leg pain. She continues to report low back pain. She also reports diabetic neuropathy to her bilateral feet. Her pain is worse with standing and walking. She has tried Qutenza patches with benefit in the past. She continues to take Robaxin and Lyrica. She also takes Norco as needed with benefit and without adverse effects. She denies any other health changes. Her pain today is 10/10.    Interval History 3/17/2023:  Mrs Waters presents for follow up of chronic, continuous neuropathic pain to [facility]. She is s/p Nevro SCS trial and unfortunately she did not get the relief she was hoping for and 50% at best relief but this was not consistent. She has no constitutional s/s concerning for infection and denies newer neurological  changes since trial. She continues with medication mgt and states Qutenza application has been providing significant benefit.     Interval History 2/10/2023:  Mrs Waters presents for follow up of chronic lower back painn and radicular pain in conjunction with PDN to bilateral feet. She is doing fair with medication mgt of Norco, Robaxin, and Lyrica and does need refill at this time. She denies SE of medications. She is also here for Qutenza application today. She is scheduled to have upcoming SCS trial with [facility] to aslo address her PDN.     Interval History 12/13/2022:  Mrs Waters presents for follow up of chronic pain. She is ready to repeat Qutenza application as it has been >91 days and she had significant improvement of symptoms of PDN. She recently had repeat A1C and just above 10.0 but is decreasing. She continues to take medication with benefit and denies SE of medication. Medication regimen include Norco 10/325mg TID, Robaxin 500mg and Lyrica 150mg.     Interval History 10/12/2022:  Mrs Waters presents for follow up of chronic neuropathy. She is s/p qutenza patch placement with improved functioning and neuropathy control. Unfortunately her A1C was above 11 which inhibits her from Nevro SCS trial at this time. She is eager to have SCS trial. She denies new areas of pain or neurological changes. She continued to take  Norco 10/325mg TID, Robaxin 500mg and Lyrica 150mg TID with benefit and denies significant SE of medications.     Interval History 9/8/2022:  Mrs Waters presents for follow up of chronic lower back painn and radicular pain in conjunction with PDN to bilateral feet. She is doing fair with medication mgt of Norco, Robaxin, and Lyrica and does need refill at this time. She denies SE of medications. She is patiently awaiting her A1C to become lower than 10 to proceed with SCS trial.     Interval History 8/12/2022:  Mrs Waters presents for delayed FU. She has had continuous pain to lumbar and radicular  pain but also peripheral neuropathy complaints. Over interval she has had CVA but doing fair. Her A1C has unfortunately elevated above 10 at this time and SCS trial placed on hold but phychiatric evaluation complete. She continues to take Norco 10/325mg TID, Robaxin 500mg TID and Lyrica 150mg TID with some benefit and denies SE of medications. No s/s concerning for cauda equina.     Interval History 4/12/2022:  Patient returns to clinic today for follow-up. Her neuropathic pain continues to be an issue for her, but she says that she is able to manage. She is taking Norco 10-325mg TID, Robaxin 500mg TID, and Lyrica 150mg TID without any adverse side effects. She denies any changes in her symptoms. She would like to proceed with SCS trial. Discussed last time that her HbA1c should be <10, was 9.8 on most recent labs. Seen by psychology and cleared for SCS.     Interval History 2/14/2022:  Mrs Waters presents for follow up. Pt states overall neuropathy continues. Since last visit she has been stable with medication mgt and takign Norco 10/325mg TID, Robaxin 500mg TID and Lyrica 150mg TID. She denies new areas of pain or neurological changes. Medication adequate to control pain without adverse SE.      Interval History 12/21/2021:  Pt presents for urgent evaluation s/p fall in kitchen last night. Pt unsure of LOC or head trauma but states some amnesia of events. Pt is having left knee pain, B ankle pain L>R and lower back/buttock pain. Daughter further states tremor like activity last night. During visit daughter asked for bedside commode due to inability to ambulate.  Pt during visit appeared somnolent, pale and began vomiting. Discussed going to ER for further evaluation.      Interval History 12/14/2021:  Mrs Waters presents for follow up of chronic pain complaints. She is hopeful she will have A1C lower soon to move forward with SCS. She is doing fair with medication mgt alone at this time and denies SE of  medications. Pt is currently taking Norco 10/325mg TID PRN, Lyrica 150mg TID, and Robaxin 500mg TID. She denies new areas of pain or neurological changes.      Interval History 10/14/2021:  The Pt presents for follow up and s/p B Lumbar sympathetic blocks. Pt states this has done well but ready to proceed with SCS trial. She is aware A1C must be under tight control and Pt and daughter re-iterate knowing this. Pt also mentions DKA admission and diagnosis of vasculitis as well over interval. Pt continues to take hydrocodone 10/325 mg TID PRN, Lyrica 150 mg TID, and Robaxin 500mg TID. She denies any SE of medication, denies new neurological changes.      Interval Hx 09/16/2021  Indira Waters presents to the clinic for a follow-up appointment for f/u after bilateral lumbar sympathetic block on 8/25/21.Patient reports >50% relief after lumbar sympathetic block that lasted 2 weeks when she then developed a UTI/DKA, was admitted to the hospital and pain recurred.  Current pain intensity is 8/10.     Interval History 8/12/2021:  Indira Waters presents to the clinic for a follow-up appointment for BLE diabetic neuropathy, painful. Continues with Norco 10/325mg, Lyrica 150mg TID, Robaxin 750mg daily PRN. She had to cancel previously scheduled lumbar sympathetic block 2/2 lithotripsy for painful kidney stones, which have now passed. She still has intermittent pain with urination but overall that pain is improving. She had to cancel previous angiogram for this same reason - this has not been rescheduled yet. She denies any change in her LE pain. Denies any new neurological sxs in BLEs.     Interval History 6/10/21:  Indira Waters presents to the clinic for a 2 week follow-up appointment from lumbar sympathetic nerve block in early May. She reports minimal relief from this intervention, but did note that it helped some, briefly. Since the last visit, Indira Waters states the pain has been persistant. Current pain  "intensity is 10/10. Patient has chronic generalized diffuse pain, most pronounced in her BL lower extremities 2/2 diabetic neuropathy. HSe states that the pain is a little better than at her last visit. Most days are 10/10, but some days are better than others. Her pain is aggravated by exertion, walking, or sitting/standing for extended periods of time. He pain is mildly improved by rest and medications. She is currently taking Lyrica 150 PO TID, Zoloft, and Norco 10-325mg TID PRN. She states that the pain meds are helping but she is still constantly in pain and unable to participate in her ADLs. She has now established care with PCP for assistance w/ poorly controlled T2DM, last A1c 11.4. She has not yet established care w/ Rheumatology for fibromyalgia management.    Interval HPI 4/15/21:  Patient returns for follow up of chronic generalized diffuse pain. At the last visit, had EMG (not yet completed), lumbar and hip x-ray imaging ordered. She was also referred to Rheumatology for fibromyalgia management and referral to PCP for DM2 management and weaning of zoloft for transition to cymbalta for treatment of neuropathy. She had her Lyrica increased to 150mg PO TID, and prescribed Norco 10mg TID with opioid contract signed. She has been taking Norco 10mg TID and it has been helping her "bad" pains but does not take all of her pain away. She has not yet been set up with her new PCP or rheumatologist yet for fibromyalgia. Still continues to have generalized pain everywhere including feet, hips, legs, lower and upper back, neck and shoulder pain. Continues to have neuropathy in the bilateral lower extremities due to uncontrolled DM2.    Initial Visit 3/11/21:  Indira Waters presents to the clinic for the evaluation of chronic pain. Complaining of pain everywhere including feet, legs, hips, lower and upper back, neck, shoulder. Pain started 5+ years ago. Pain 10/10 at worse. She was referred here by her PCP Dr. Ramos " who she has been seeing for over 6 years. She states her pain is aggravated by any physical activity/movement. She takes lyrica, robaxin, and Norco 10 TID with mild relief of pain. Per patient, she was referred here by her PCP for medication refill. She has not tried physical therapy for several years, she states it did not help last time she was in PT. She says she is trying to exercise daily by doing yoga. She walks with a walker. She has numerous comorbidities including DM2 (Last A1C 9+ per prior family medicine note in Jan 2021), fibromyalgia, lupus, DDD. She states she would like to find a new PCP as she no longer lives near her old one. Patient denies night fever/night sweats, urinary incontinence, bowel incontinence and significant weight loss.      Pain Disability Index Review:      3/14/2024     2:12 PM 1/26/2024     2:50 PM 12/14/2023     2:58 PM   Last 3 PDI Scores   Pain Disability Index (PDI) 56 10 56     Pain Medications:  Norco 10-325mg TID, Robaxin 500mg TID, and Lyrica 150mg TID    Opioid Contract: yes     report:  Reviewed and consistent with medication use as prescribed.    Pain Procedures:    - reports possible back injection 10+ years ago  - Lumbar sympathetic nerve block 05/05/21 - Dr. Pereira - minimal relief    Physical Therapy/Home Exercise: no     Imaging:   Hip X-ray 4/7/21  FINDINGS:  Osseous structures appear intact without evidence of fracture or osseous destructive process.  No apparent dislocation.     Modest degenerative change or significant joint space narrowing.     Lumbar X-ray 4/7/21  FINDINGS:  Diffuse bony demineralization.  Vertebral bodies are normal in height without evidence of fracture.  No pars defects.     Normal sagittal alignment is preserved.  No spondylolisthesis. No abnormal translation with flexion and extension.     Intervertebral disc heights are well maintained.  Mild facet arthropathy in the lower lumbar spine.     Mild scattered vascular calcification.      Impression:     No evidence of fracture or malalignment.     Mild facet arthropathy in the lower lumbar spine.     Diffuse bony demineralization.  Consider correlation with DEXA.    Allergies:   Review of patient's allergies indicates:   Allergen Reactions    Bleach (sodium hypochlorite) Shortness Of Breath    Nitrofurantoin macrocrystalline Anaphylaxis    Lipitor [atorvastatin] Diarrhea and Rash    Nsaids (non-steroidal anti-inflammatory drug)      Tolerates aspirin      Pcn [penicillins]     Toradol [ketorolac]        Current Medications:   Current Outpatient Medications   Medication Sig Dispense Refill    allopurinoL (ZYLOPRIM) 300 MG tablet TAKE ONE TABLET BY MOUTH EVERY DAY 90 tablet 1    aspirin (ECOTRIN) 81 MG EC tablet Take 1 tablet (81 mg total) by mouth once daily. 30 tablet 0    atorvastatin (LIPITOR) 40 MG tablet Take 40 mg by mouth every evening.      blood sugar diagnostic Strp To check BG 6 times daily, to use with insurance preferred meter 200 each 11    blood-glucose meter kit To check BG 6 times daily, to use with insurance preferred meter 1 each 0    blood-glucose meter,continuous (DEXCOM G7 ) Misc Use as directed. 1 each 0    blood-glucose sensor (DEXCOM G7 SENSOR) Nicole Change every 10 days. 3 each 11    cloNIDine (CATAPRES) 0.1 MG tablet Take 1 tablet (0.1 mg total) by mouth 2 (two) times daily. 180 tablet 3    dicyclomine (BENTYL) 10 MG capsule Take 10 mg by mouth 3 (three) times daily.      evolocumab (REPATHA SURECLICK) 140 mg/mL PnIj Inject 1 mL (140 mg total) into the skin every 14 (fourteen) days. 2 each 11    fenofibrate micronized (LOFIBRA) 134 MG Cap Take 1 capsule (134 mg total) by mouth daily with breakfast. 90 capsule 3    furosemide (LASIX) 40 MG tablet TAKE ONE TABLET BY MOUTH EVERY DAY 90 tablet 1    HYDROcodone-acetaminophen (NORCO)  mg per tablet Take 1 tablet by mouth every 8 (eight) hours as needed for Pain. 90 tablet 0    hydrocortisone (ANUSOL-HC) 2.5 %  "rectal cream Procto-Med HC 2.5 % topical cream perineal applicator      insulin aspart U-100 (NOVOLOG FLEXPEN U-100 INSULIN) 100 unit/mL (3 mL) InPn pen Inject 14 Units into the skin 3 (three) times daily with meals. + meal correction scale. Max 40 units 30 mL 3    insulin detemir U-100, Levemir, (LEVEMIR FLEXTOUCH U100 INSULIN) 100 unit/mL (3 mL) InPn pen Inject 20 Units into the skin once daily AND 18 Units every evening. 30 mL 3    ipratropium-albuteroL (COMBIVENT)  mcg/actuation inhaler Inhale 1 puff into the lungs by mouth every 6 (six) hours. 4 g 0    lancets Misc To check BG 6 times daily, to use with insurance preferred meter 200 each 11    losartan (COZAAR) 50 MG tablet Take 1 tablet (50 mg total) by mouth once daily. 90 tablet 3    methocarbamoL (ROBAXIN) 500 MG Tab TAKE ONE TABLET BY MOUTH 3 TIMES DAILY AS NEEDED FOR MUSCLE SPASMS 90 tablet 2    metoclopramide HCl (REGLAN) 5 MG tablet TAKE ONE TABLET BY MOUTH FOUR TIMES DAILY AS NEEDED FOR NAUSEA PREVENTION 30 tablet 3    metoprolol succinate (TOPROL-XL) 100 MG 24 hr tablet Take 1 tablet (100 mg total) by mouth once daily. 90 tablet 3    mupirocin (BACTROBAN) 2 % ointment Apply topically 3 (three) times daily as needed (skin breakdown). 22 g 3    NIFEdipine (PROCARDIA-XL) 30 MG (OSM) 24 hr tablet Take 1 tablet (30 mg total) by mouth 2 (two) times a day. 180 tablet 3    nitroGLYCERIN (NITROSTAT) 0.4 MG SL tablet Place 1 tablet (0.4 mg total) under the tongue every 5 (five) minutes as needed for Chest pain. 25 tablet 11    pantoprazole (PROTONIX) 40 MG tablet Take 1 tablet (40 mg total) by mouth once daily. 30 tablet 0    pen needle, diabetic 31 gauge x 1/4" Ndle 1 each by Misc.(Non-Drug; Combo Route) route 5 (five) times daily. 500 each 3    pioglitazone (ACTOS) 15 MG tablet Take 15 mg by mouth once daily.      pregabalin (LYRICA) 150 MG capsule TAKE ONE CAPSULE BY MOUTH 3 TIMES DAILY 90 capsule 2    senna-docusate 8.6-50 mg (PERICOLACE) 8.6-50 mg " per tablet Take 1 tablet by mouth 2 (two) times daily as needed for Constipation. 60 tablet 0    sertraline (ZOLOFT) 100 MG tablet Take 1 tablet (100 mg total) by mouth once daily. 90 tablet 3    teriparatide 20 mcg/dose (620mcg/2.48mL) PnIj Inject 20 mcg into the skin once daily. 2.48 mL 5    triamcinolone acetonide 0.1% (KENALOG) 0.1 % cream Apply to affected areas of body twice daily as needed rash. Do not use on face, underarms, or groin. 454 g 0    acetaminophen (TYLENOL) 500 MG tablet Take 2 tablets (1,000 mg total) by mouth every 8 (eight) hours as needed for Pain. (Patient not taking: Reported on 2/2/2024)  0     Current Facility-Administered Medications   Medication Dose Route Frequency Provider Last Rate Last Admin    capsaicin-skin cleanser patch 1 patch  1 patch Topical (Top) 1 time in Clinic/HOD Elvira Hylton NP           REVIEW OF SYSTEMS:    GENERAL:  No weight loss, malaise or fevers.  HEENT:  Negative for frequent or significant headaches.  NECK:  Negative for lumps, goiter, pain and significant neck swelling.  RESPIRATORY:  Negative for cough, wheezing or shortness of breath.  CARDIOVASCULAR:  Negative for chest pain, leg swelling or palpitations. HTN  GI:  Negative for abdominal discomfort, blood in stools or black stools or change in bowel habits.  MUSCULOSKELETAL:  See HPI.  SKIN:  Negative for lesions, rash, and itching.  ENDO: Diabetes  PSYCH:  Negative for sleep disturbance, mood disorder and recent psychosocial stressors.  HEMATOLOGY/LYMPHOLOGY:  Negative for prolonged bleeding, bruising easily or swollen nodes.  NEURO:   No history of headaches, syncope, paralysis, seizures or tremors.  All other reviewed and negative other than HPI.    Past Medical History:  Past Medical History:   Diagnosis Date    Arthritis     Back pain     Cancer     ovarian    Cervical cancer     Coronary artery disease     Depression     Diabetes mellitus     Fibromyalgia     Heart attack     History of MI  (myocardial infarction)     Hyperlipidemia     Hypertension     Lupus     Stroke     slight left sided weakness       Past Surgical History:  Past Surgical History:   Procedure Laterality Date    APPENDECTOMY       SECTION      2    CORONARY ANGIOGRAPHY N/A 2022    Procedure: ANGIOGRAM, CORONARY ARTERY;  Surgeon: Jose L Méndez MD;  Location: [facility] CATH LAB;  Service: Cardiology;  Laterality: N/A;    HYSTERECTOMY      with USO for cervical cancer    INJECTION OF ANESTHETIC AGENT AROUND NERVE Bilateral 2021    Procedure: BLOCK, NERVE, SYMPATHIC;  Surgeon: Holden Pereira MD;  Location: [facility] PAIN MGT;  Service: Pain Management;  Laterality: Bilateral;    INJECTION OF ANESTHETIC AGENT AROUND NERVE N/A 2021    Procedure: BLOCK, NERVE, SYMPATHETIC  need consent;  Surgeon: Holden Pereira MD;  Location: [facility] PAIN MGT;  Service: Pain Management;  Laterality: N/A;    IL EVAL,SWALLOW FUNCTION,CINE/VIDEO RECORD  2021         TONSILLECTOMY      TRIAL OF SPINAL CORD NERVE STIMULATOR N/A 3/8/2023    Procedure: LUMBAR SPINAL CORD STIMULATOR TRIAL NEVRO REP PATIENT STATES SHE NO LONGER TAKES PLAVIX;  Surgeon: Holden Pereira MD;  Location: [facility] PAIN MGT;  Service: Pain Management;  Laterality: N/A;       Family History:  Family History   Problem Relation Age of Onset    COPD Mother     Lupus Mother     Hernia Mother     Uterine cancer Mother         vs cervical cancer    Ovarian cancer Mother     Diabetes Father     Coronary artery disease Father     Colon cancer Maternal Grandmother         in her 50's       Social History:  Social History     Socioeconomic History    Marital status:    Tobacco Use    Smoking status: Former     Current packs/day: 0.00     Types: Cigarettes     Quit date: 2020     Years since quitting: 3.3     Passive exposure: Past    Smokeless tobacco: Never   Substance and Sexual Activity    Alcohol use: Not Currently     Comment: occasionally    Drug use: Yes      Types: Hydrocodone     Comment: three times a day    Sexual activity: Not Currently   Social History Narrative    Lives with daughter. .      Social Determinants of Health     Financial Resource Strain: Low Risk  (2/27/2024)    Overall Financial Resource Strain (CARDIA)     Difficulty of Paying Living Expenses: Not hard at all   Food Insecurity: Unknown (2/27/2024)    Hunger Vital Sign     Worried About Running Out of Food in the Last Year: Patient declined     Ran Out of Food in the Last Year: Never true   Transportation Needs: No Transportation Needs (2/27/2024)    PRAPARE - Transportation     Lack of Transportation (Medical): No     Lack of Transportation (Non-Medical): No   Physical Activity: Insufficiently Active (2/27/2024)    Exercise Vital Sign     Days of Exercise per Week: 2 days     Minutes of Exercise per Session: 10 min   Stress: No Stress Concern Present (2/27/2024)    [employer] - Occupational Stress Questionnaire     Feeling of Stress : Not at all   Social Connections: Moderately Isolated (2/27/2024)    Social Connection and Isolation Panel [NHANES]     Frequency of Communication with Friends and Family: More than three times a week     Frequency of Social Gatherings with Friends and Family: More than three times a week     Attends Sabianist Services: Never     Active Member of Clubs or Organizations: No     Attends Club or Organization Meetings: 1 to 4 times per year     Marital Status:    Housing Stability: Unknown (2/27/2024)    Housing Stability Vital Sign     Unable to Pay for Housing in the Last Year: No     Unstable Housing in the Last Year: No       OBJECTIVE:    /69   Pulse 84   Temp 97.6 °F (36.4 °C)   Wt 84.6 kg (186 lb 8.2 oz)   BMI 36.43 kg/m²     PHYSICAL EXAMINATION:     General appearance: Well appearing, in no acute distress, alert and oriented x3.  Psych:  Mood and affect appropriate.  Skin: Skin color, texture, turgor  normal, no rashes or lesions, in both upper and lower body.  Head/face:  Atraumatic, normocephalic.  Pulm: Symmetric chest rise, no respiratory distress noted.   Back: Straight leg raise in the sitting position is negative for radicular pain.   Musculoskeletal: 5/5 strength in right ankle with plantar and dorsiflexion. 5/5 strength in left ankle with plantar and dorsiflexion. 4/5 strength with right knee flexion and extension. 4/5 strength with left knee flexion and extension.   No gross deformity of the left knee. TTP over the medial joint line of left knee. Poor knee AROM, with limitation primarily in knee flexion. No laxitry on varus/valgus stress. [facility] negative.   Neuro:  Decreased sensation to light touch distally in BLE.   Gait: Antalgic- wheelchair for mobility..     ASSESSMENT: 79 y.o. year old female with chronic pain, consistent with:     1. Painful diabetic neuropathy  pregabalin (LYRICA) 150 MG capsule    HYDROcodone-acetaminophen (NORCO)  mg per tablet      2. Chronic pain syndrome  pregabalin (LYRICA) 150 MG capsule    HYDROcodone-acetaminophen (NORCO)  mg per tablet      3. Lumbar spondylosis  pregabalin (LYRICA) 150 MG capsule      4. DDD (degenerative disc disease), lumbar  pregabalin (LYRICA) 150 MG capsule      5. Primary osteoarthritis of left knee  X-ray AP Standing Knees with Left Lateral              PLAN:     - I have stressed the importance of physical activity and a home exercise plan to help with pain and improve health.  - Patient can continue with medications for now since they are providing benefits, using them appropriately, and without side effects.  - Previous imaging reviewed. Labs reviewed.   - XR LEFT Knee.  - Schedule patient for LEFT knee corticosteroid injection.  - Continue Robaxin 500 mg TID PRN.  - Increase Lyrica 150 mg from TID to QID.   - Continue Norco 10/325 mg TID PRN, #90, refill provided.    - The patient is here today for a refill of current pain  medications and they believe these provide effective pain control and improvements in quality of life.  The patient notes no serious side effects, and feels the benefits outweigh the risks.  The patient was reminded of the pain contract that they signed previously as well as the risks and benefits of the medication including possible death.  The updated [place] [employer] prescription monitoring program was reviewed, and the patient has been found to be compliant with current treatment plan.   - UDS from 12/14/2023 reviewed and consistent.    - RTC for above listed procedure. If no relief from increase lyrica dosing with regards to back pain, may consider increased dosing frequency of Norco. Consider BL L3,4,5 MBB, and if successful, can proceed to RFA of same levels.   - Counseled patient regarding the importance of activity modification, constant sleeping habits, and physical therapy.  The above plan and management options were discussed at length with patient. Patient is in agreement with the above and verbalized understanding.      Frankie Brower MD   I have personally reviewed the history and exam of this patient and agree with the resident/fellow/NPs note as stated above.    Holden Pereira MD    03/14/2024

## 2024-03-14 NOTE — PROGRESS NOTES
Chronic patient Established Note (Follow up visit)    Interval History 3/14/2024:  Indira Waters returns today for f/u of her painful neuropathy in the feet. She was last seen in January for this complaint. Qutenza patches were administered at that time. She reports ***. Pain today is an ***/10.     Interval History 1/26/2024:  The patient returns to clinic today for foot pain. She continues to report neuropathic pain into her feet. She describes this as burning in nature. She does find benefit with Qutenza patches. She denies any rash or increased pain after the patch application. She continues to take Lyrica, Robaxin, and Norco with benefit. She denies any adverse effects. She denies any other health changes. Her pain today is 8/10.    Interval History 12/14/2023:  The patient returns to clinic today for follow up of back and leg pain. She continues to report low back pain that radiates into both legs. She continues to report bilateral neuropathic pain into the feet. She states that today is a bad day. She is having more left foot and toe pain. Her pain is worse with prolonged standing and walking. She does have benefit with Qutenza patches. She is taking Lyrica, Robaxin, and Norco with benefit and without adverse effects. She denies other health changes. Her pain today is 8/10.    Interval History 10/11/2023:  The patient returns to clinic today for follow up of bilateral foot pain. She continues to report neuropathic pain to her feet. Her pain is worse with standing and walking. She does have benefit with Qutenza patches. She also takes Lyrica, Robaxin, and Norco. She denies any adverse effects. She denies any other health changes. Her pain today is 8/10.    Interval History 9/15/2023:  The patient returns to clinic today for follow up of back and leg pain. She did have benefit with Qutenza patches. She continues to report low back pain with intermittent radicular leg pain. She continues to report  neuropathic pain into her feet. She continues to take Lyrica and Robaxin with benefit. She also takes Norco as needed with benefit. She denies any adverse effects. She denies any other health changes. Her pain today is 9/10.    Interval History 6/27/2023:  The patient returns to clinic today for follow up of pain. She continues to report nerve pain to her feet bilaterally. This pain is worse with standing and walking. She is taking Lyrica and Robaxin. She also takes Norco as needed with benefit and without adverse effects. She denies any other health changes. Her pain today is 9/10.    Interval History 6/16/2023:  The patient returns to clinic today for follow up of back and leg pain. She continues to report low back pain. She also reports diabetic neuropathy to her bilateral feet. Her pain is worse with standing and walking. She has tried Qutenza patches with benefit in the past. She continues to take Robaxin and Lyrica. She also takes Norco as needed with benefit and without adverse effects. She denies any other health changes. Her pain today is 10/10.    Interval History 3/17/2023:  Mrs Waters presents for follow up of chronic, continuous neuropathic pain to [facility]. She is s/p Nevro SCS trial and unfortunately she did not get the relief she was hoping for and 50% at best relief but this was not consistent. She has no constitutional s/s concerning for infection and denies newer neurological changes since trial. She continues with medication mgt and states Qutenza application has been providing significant benefit.     Interval History 2/10/2023:  Mrs Waters presents for follow up of chronic lower back painn and radicular pain in conjunction with PDN to bilateral feet. She is doing fair with medication mgt of Norco, Robaxin, and Lyrica and does need refill at this time. She denies SE of medications. She is also here for Qutenza application today. She is scheduled to have upcoming SCS trial with [facility] address her  PDN.     Interval History 12/13/2022:  Mrs Waters presents for follow up of chronic pain. She is ready to repeat Qutenza application as it has been >91 days and she had significant improvement of symptoms of PDN. She recently had repeat A1C and just above 10.0 but is decreasing. She continues to take medication with benefit and denies SE of medication. Medication regimen include Norco 10/325mg TID, Robaxin 500mg and Lyrica 150mg.     Interval History 10/12/2022:  Mrs Waters presents for follow up of chronic neuropathy. She is s/p qutenza patch placement with improved functioning and neuropathy control. Unfortunately her A1C was above 11 which inhibits her from Nevro SCS trial at this time. She is eager to have SCS trial. She denies new areas of pain or neurological changes. She continued to take  Norco 10/325mg TID, Robaxin 500mg and Lyrica 150mg TID with benefit and denies significant SE of medications.     Interval History 9/8/2022:  Mrs Waters presents for follow up of chronic lower back painn and radicular pain in conjunction with PDN to bilateral feet. She is doing fair with medication mgt of Norco, Robaxin, and Lyrica and does need refill at this time. She denies SE of medications. She is patiently awaiting her A1C to become lower than 10 to proceed with SCS trial.     Interval History 8/12/2022:  Mrs Waters presents for delayed FU. She has had continuous pain to lumbar and radicular pain but also peripheral neuropathy complaints. Over interval she has had CVA but doing fair. Her A1C has unfortunately elevated above 10 at this time and SCS trial placed on hold but phychiatric evaluation complete. She continues to take Norco 10/325mg TID, Robaxin 500mg TID and Lyrica 150mg TID with some benefit and denies SE of medications. No s/s concerning for cauda equina.     Interval History 4/12/2022:  Patient returns to clinic today for follow-up. Her neuropathic pain continues to be an issue for her, but she says that she  is able to manage. She is taking Norco 10-325mg TID, Robaxin 500mg TID, and Lyrica 150mg TID without any adverse side effects. She denies any changes in her symptoms. She would like to proceed with SCS trial. Discussed last time that her HbA1c should be <10, was 9.8 on most recent labs. Seen by psychology and cleared for SCS.     Interval History 2/14/2022:  Mrs Waters presents for follow up. Pt states overall neuropathy continues. Since last visit she has been stable with medication mgt and takign Norco 10/325mg TID, Robaxin 500mg TID and Lyrica 150mg TID. She denies new areas of pain or neurological changes. Medication adequate to control pain without adverse SE.      Interval History 12/21/2021:  Pt presents for urgent evaluation s/p fall in kitchen last night. Pt unsure of LOC or head trauma but states some amnesia of events. Pt is having left knee pain, B ankle pain L>R and lower back/buttock pain. Daughter further states tremor like activity last night. During visit daughter asked for bedside commode due to inability to ambulate.  Pt during visit appeared somnolent, pale and began vomiting. Discussed going to ER for further evaluation.      Interval History 12/14/2021:  Mrs Waters presents for follow up of chronic pain complaints. She is hopeful she will have A1C lower soon to move forward with SCS. She is doing fair with medication mgt alone at this time and denies SE of medications. Pt is currently taking Norco 10/325mg TID PRN, Lyrica 150mg TID, and Robaxin 500mg TID. She denies new areas of pain or neurological changes.      Interval History 10/14/2021:  The Pt presents for follow up and s/p B Lumbar sympathetic blocks. Pt states this has done well but ready to proceed with SCS trial. She is aware A1C must be under tight control and Pt and daughter re-iterate knowing this. Pt also mentions DKA admission and diagnosis of vasculitis as well over interval. Pt continues to take hydrocodone 10/325 mg TID PRN,  Lyrica 150 mg TID, and Robaxin 500mg TID. She denies any SE of medication, denies new neurological changes.      Interval Hx 09/16/2021  Indira Waters presents to the clinic for a follow-up appointment for f/u after bilateral lumbar sympathetic block on 8/25/21.Patient reports >50% relief after lumbar sympathetic block that lasted 2 weeks when she then developed a UTI/DKA, was admitted to the hospital and pain recurred.  Current pain intensity is 8/10.     Interval History 8/12/2021:  Indira Waters presents to the clinic for a follow-up appointment for BLE diabetic neuropathy, painful. Continues with Norco 10/325mg, Lyrica 150mg TID, Robaxin 750mg daily PRN. She had to cancel previously scheduled lumbar sympathetic block 2/2 lithotripsy for painful kidney stones, which have now passed. She still has intermittent pain with urination but overall that pain is improving. She had to cancel previous angiogram for this same reason - this has not been rescheduled yet. She denies any change in her LE pain. Denies any new neurological sxs in BLEs.     Interval History 6/10/21:  Indira Waters presents to the clinic for a 2 week follow-up appointment from lumbar sympathetic nerve block in early May. She reports minimal relief from this intervention, but did note that it helped some, briefly. Since the last visit, Indira Waters states the pain has been persistant. Current pain intensity is 10/10. Patient has chronic generalized diffuse pain, most pronounced in her BL lower extremities 2/2 diabetic neuropathy. HSe states that the pain is a little better than at her last visit. Most days are 10/10, but some days are better than others. Her pain is aggravated by exertion, walking, or sitting/standing for extended periods of time. He pain is mildly improved by rest and medications. She is currently taking Lyrica 150 PO TID, Zoloft, and Norco 10-325mg TID PRN. She states that the pain meds are helping but she is still  "constantly in pain and unable to participate in her ADLs. She has now established care with PCP for assistance w/ poorly controlled T2DM, last A1c 11.4. She has not yet established care w/ Rheumatology for fibromyalgia management.    Interval HPI 4/15/21:  Patient returns for follow up of chronic generalized diffuse pain. At the last visit, had EMG (not yet completed), lumbar and hip x-ray imaging ordered. She was also referred to Rheumatology for fibromyalgia management and referral to PCP for DM2 management and weaning of zoloft for transition to cymbalta for treatment of neuropathy. She had her Lyrica increased to 150mg PO TID, and prescribed Norco 10mg TID with opioid contract signed. She has been taking Norco 10mg TID and it has been helping her "bad" pains but does not take all of her pain away. She has not yet been set up with her new PCP or rheumatologist yet for fibromyalgia. Still continues to have generalized pain everywhere including feet, hips, legs, lower and upper back, neck and shoulder pain. Continues to have neuropathy in the bilateral lower extremities due to uncontrolled DM2.    Initial Visit 3/11/21:  Indira Waters presents to the clinic for the evaluation of chronic pain. Complaining of pain everywhere including feet, legs, hips, lower and upper back, neck, shoulder. Pain started 5+ years ago. Pain 10/10 at worse. She was referred here by her PCP Dr. Ramos who she has been seeing for over 6 years. She states her pain is aggravated by any physical activity/movement. She takes lyrica, robaxin, and Norco 10 TID with mild relief of pain. Per patient, she was referred here by her PCP for medication refill. She has not tried physical therapy for several years, she states it did not help last time she was in PT. She says she is trying to exercise daily by doing yoga. She walks with a walker. She has numerous comorbidities including DM2 (Last A1C 9+ per prior family medicine note in Jan 2021), " fibromyalgia, lupus, DDD. She states she would like to find a new PCP as she no longer lives near her old one. Patient denies night fever/night sweats, urinary incontinence, bowel incontinence and significant weight loss.      Pain Disability Index Review:      1/26/2024     2:50 PM 12/14/2023     2:58 PM 10/11/2023     2:34 PM   Last 3 PDI Scores   Pain Disability Index (PDI) 10 56 40     Pain Medications:  Norco 10-325mg TID, Robaxin 500mg TID, and Lyrica 150mg TID    Opioid Contract: yes     report:  Reviewed and consistent with medication use as prescribed.***    Pain Procedures:    - reports possible back injection 10+ years ago  - Lumbar sympathetic nerve block 05/05/21 - Dr. Pereira - minimal relief    Physical Therapy/Home Exercise: no     Imaging:   Hip X-ray 4/7/21  FINDINGS:  Osseous structures appear intact without evidence of fracture or osseous destructive process.  No apparent dislocation.     Modest degenerative change or significant joint space narrowing.     Lumbar X-ray 4/7/21  FINDINGS:  Diffuse bony demineralization.  Vertebral bodies are normal in height without evidence of fracture.  No pars defects.     Normal sagittal alignment is preserved.  No spondylolisthesis. No abnormal translation with flexion and extension.     Intervertebral disc heights are well maintained.  Mild facet arthropathy in the lower lumbar spine.     Mild scattered vascular calcification.     Impression:     No evidence of fracture or malalignment.     Mild facet arthropathy in the lower lumbar spine.     Diffuse bony demineralization.  Consider correlation with DEXA.    Allergies:   Review of patient's allergies indicates:   Allergen Reactions    Bleach (sodium hypochlorite) Shortness Of Breath    Nitrofurantoin macrocrystalline Anaphylaxis    Lipitor [atorvastatin] Diarrhea and Rash    Nsaids (non-steroidal anti-inflammatory drug)      Tolerates aspirin      Pcn [penicillins]     Toradol [ketorolac]        Current  Medications:   Current Outpatient Medications   Medication Sig Dispense Refill    acetaminophen (TYLENOL) 500 MG tablet Take 2 tablets (1,000 mg total) by mouth every 8 (eight) hours as needed for Pain. (Patient not taking: Reported on 2/2/2024)  0    allopurinoL (ZYLOPRIM) 300 MG tablet TAKE ONE TABLET BY MOUTH EVERY DAY 90 tablet 1    aspirin (ECOTRIN) 81 MG EC tablet Take 1 tablet (81 mg total) by mouth once daily. 30 tablet 0    atorvastatin (LIPITOR) 40 MG tablet Take 40 mg by mouth every evening.      blood sugar diagnostic Strp To check BG 6 times daily, to use with insurance preferred meter 200 each 11    blood-glucose meter kit To check BG 6 times daily, to use with insurance preferred meter 1 each 0    blood-glucose meter,continuous (DEXCOM G7 ) Misc Use as directed. 1 each 0    blood-glucose sensor (DEXCOM G7 SENSOR) Nicole Change every 10 days. 3 each 11    cloNIDine (CATAPRES) 0.1 MG tablet Take 1 tablet (0.1 mg total) by mouth 2 (two) times daily. 180 tablet 3    dicyclomine (BENTYL) 10 MG capsule Take 10 mg by mouth 3 (three) times daily.      evolocumab (REPATHA SURECLICK) 140 mg/mL PnIj Inject 1 mL (140 mg total) into the skin every 14 (fourteen) days. 2 each 11    fenofibrate micronized (LOFIBRA) 134 MG Cap Take 1 capsule (134 mg total) by mouth daily with breakfast. 90 capsule 3    furosemide (LASIX) 40 MG tablet TAKE ONE TABLET BY MOUTH EVERY DAY 90 tablet 1    HYDROcodone-acetaminophen (NORCO)  mg per tablet Take 1 tablet by mouth every 8 (eight) hours as needed for Pain. 90 tablet 0    hydrocortisone (ANUSOL-HC) 2.5 % rectal cream Procto-Med HC 2.5 % topical cream perineal applicator      insulin aspart U-100 (NOVOLOG FLEXPEN U-100 INSULIN) 100 unit/mL (3 mL) InPn pen Inject 14 Units into the skin 3 (three) times daily with meals. + meal correction scale. Max 40 units 30 mL 3    insulin detemir U-100, Levemir, (LEVEMIR FLEXTOUCH U100 INSULIN) 100 unit/mL (3 mL) InPn pen Inject 20  "Units into the skin once daily AND 18 Units every evening. 30 mL 3    ipratropium-albuteroL (COMBIVENT)  mcg/actuation inhaler Inhale 1 puff into the lungs by mouth every 6 (six) hours. 4 g 0    lancets Misc To check BG 6 times daily, to use with insurance preferred meter 200 each 11    losartan (COZAAR) 50 MG tablet Take 1 tablet (50 mg total) by mouth once daily. 90 tablet 3    methocarbamoL (ROBAXIN) 500 MG Tab TAKE ONE TABLET BY MOUTH 3 TIMES DAILY AS NEEDED FOR MUSCLE SPASMS 90 tablet 2    metoclopramide HCl (REGLAN) 5 MG tablet TAKE ONE TABLET BY MOUTH FOUR TIMES DAILY AS NEEDED FOR NAUSEA PREVENTION 30 tablet 3    metoprolol succinate (TOPROL-XL) 100 MG 24 hr tablet Take 1 tablet (100 mg total) by mouth once daily. 90 tablet 3    mupirocin (BACTROBAN) 2 % ointment Apply topically 3 (three) times daily as needed (skin breakdown). 22 g 3    NIFEdipine (PROCARDIA-XL) 30 MG (OSM) 24 hr tablet Take 1 tablet (30 mg total) by mouth 2 (two) times a day. 180 tablet 3    nitroGLYCERIN (NITROSTAT) 0.4 MG SL tablet Place 1 tablet (0.4 mg total) under the tongue every 5 (five) minutes as needed for Chest pain. 25 tablet 11    pantoprazole (PROTONIX) 40 MG tablet Take 1 tablet (40 mg total) by mouth once daily. 30 tablet 0    pen needle, diabetic 31 gauge x 1/4" Ndle 1 each by Misc.(Non-Drug; Combo Route) route 5 (five) times daily. 500 each 3    pioglitazone (ACTOS) 15 MG tablet Take 15 mg by mouth once daily.      pregabalin (LYRICA) 150 MG capsule TAKE ONE CAPSULE BY MOUTH 3 TIMES DAILY 90 capsule 2    senna-docusate 8.6-50 mg (PERICOLACE) 8.6-50 mg per tablet Take 1 tablet by mouth 2 (two) times daily as needed for Constipation. 60 tablet 0    sertraline (ZOLOFT) 100 MG tablet Take 1 tablet (100 mg total) by mouth once daily. 90 tablet 3    teriparatide 20 mcg/dose (620mcg/2.48mL) PnIj Inject 20 mcg into the skin once daily. 2.48 mL 5    triamcinolone acetonide 0.1% (KENALOG) 0.1 % cream Apply to affected areas " of body twice daily as needed rash. Do not use on face, underarms, or groin. 454 g 0     Current Facility-Administered Medications   Medication Dose Route Frequency Provider Last Rate Last Admin    capsaicin-skin cleanser patch 1 patch  1 patch Topical (Top) 1 time in Clinic/HOD Elvira Hylton, NP           REVIEW OF SYSTEMS:    GENERAL:  No weight loss, malaise or fevers.  HEENT:  Negative for frequent or significant headaches.  NECK:  Negative for lumps, goiter, pain and significant neck swelling.  RESPIRATORY:  Negative for cough, wheezing or shortness of breath.  CARDIOVASCULAR:  Negative for chest pain, leg swelling or palpitations. HTN  GI:  Negative for abdominal discomfort, blood in stools or black stools or change in bowel habits.  MUSCULOSKELETAL:  See HPI.  SKIN:  Negative for lesions, rash, and itching.  ENDO: Diabetes  PSYCH:  Negative for sleep disturbance, mood disorder and recent psychosocial stressors.  HEMATOLOGY/LYMPHOLOGY:  Negative for prolonged bleeding, bruising easily or swollen nodes.  NEURO:   No history of headaches, syncope, paralysis, seizures or tremors.  All other reviewed and negative other than HPI.    Past Medical History:  Past Medical History:   Diagnosis Date    Arthritis     Back pain     Cancer     ovarian    Cervical cancer     Coronary artery disease     Depression     Diabetes mellitus     Fibromyalgia     Heart attack     History of MI (myocardial infarction)     Hyperlipidemia     Hypertension     Lupus     Stroke     slight left sided weakness       Past Surgical History:  Past Surgical History:   Procedure Laterality Date    APPENDECTOMY       SECTION      2    CORONARY ANGIOGRAPHY N/A 2022    Procedure: ANGIOGRAM, CORONARY ARTERY;  Surgeon: Jose L Méndez MD;  Location: [facility], [place], operated by [facility] CATH LAB;  Service: Cardiology;  Laterality: N/A;    HYSTERECTOMY      with USO for cervical cancer    INJECTION OF ANESTHETIC AGENT AROUND NERVE Bilateral 2021    Procedure:  BLOCK, NERVE, SYMPATHIC;  Surgeon: Holden Pereira MD;  Location: [facility] PAIN MGT;  Service: Pain Management;  Laterality: Bilateral;    INJECTION OF ANESTHETIC AGENT AROUND NERVE N/A 08/25/2021    Procedure: BLOCK, NERVE, SYMPATHETIC  need consent;  Surgeon: Holden Pereira MD;  Location: [facility] PAIN MGT;  Service: Pain Management;  Laterality: N/A;    IN EVAL,SWALLOW FUNCTION,CINE/VIDEO RECORD  09/09/2021         TONSILLECTOMY      TRIAL OF SPINAL CORD NERVE STIMULATOR N/A 3/8/2023    Procedure: LUMBAR SPINAL CORD STIMULATOR TRIAL NEVRO REP PATIENT STATES SHE NO LONGER TAKES PLAVIX;  Surgeon: Holden Pereira MD;  Location: [facility] PAIN MGT;  Service: Pain Management;  Laterality: N/A;       Family History:  Family History   Problem Relation Age of Onset    COPD Mother     Lupus Mother     Hernia Mother     Uterine cancer Mother         vs cervical cancer    Ovarian cancer Mother     Diabetes Father     Coronary artery disease Father     Colon cancer Maternal Grandmother         in her 50's       Social History:  Social History     Socioeconomic History    Marital status:    Tobacco Use    Smoking status: Former     Current packs/day: 0.00     Types: Cigarettes     Quit date: 11/2020     Years since quitting: 3.3     Passive exposure: Past    Smokeless tobacco: Never   Substance and Sexual Activity    Alcohol use: Not Currently     Comment: occasionally    Drug use: Yes     Types: Hydrocodone     Comment: three times a day    Sexual activity: Not Currently   Social History Narrative    Lives with daughter. .      Social Determinants of Health     Financial Resource Strain: Low Risk  (2/27/2024)    Overall Financial Resource Strain (CARDIA)     Difficulty of Paying Living Expenses: Not hard at all   Food Insecurity: Unknown (2/27/2024)    Hunger Vital Sign     Worried About Running Out of Food in the Last Year: Patient declined     Ran Out of Food in the Last Year: Never true   Transportation Needs:  No Transportation Needs (2/27/2024)    PRAPARE - Transportation     Lack of Transportation (Medical): No     Lack of Transportation (Non-Medical): No   Physical Activity: Insufficiently Active (2/27/2024)    Exercise Vital Sign     Days of Exercise per Week: 2 days     Minutes of Exercise per Session: 10 min   Stress: No Stress Concern Present (2/27/2024)    [employer] [place] [employer] - Occupational Stress Questionnaire     Feeling of Stress : Not at all   Social Connections: Moderately Isolated (2/27/2024)    Social Connection and Isolation Panel [NHANES]     Frequency of Communication with Friends and Family: More than three times a week     Frequency of Social Gatherings with Friends and Family: More than three times a week     Attends Yazdanism Services: Never     Active Member of Clubs or Organizations: No     Attends Club or Organization Meetings: 1 to 4 times per year     Marital Status:    Housing Stability: Unknown (2/27/2024)    Housing Stability Vital Sign     Unable to Pay for Housing in the Last Year: No     Unstable Housing in the Last Year: No       OBJECTIVE:    There were no vitals taken for this visit.    PHYSICAL EXAMINATION:     General appearance: Well appearing, in no acute distress, alert and oriented x3.  Psych:  Mood and affect appropriate.  Skin: Skin color, texture, turgor normal, no rashes or lesions, in both upper and lower body.  Head/face:  Atraumatic, normocephalic.  Pulm: Symmetric chest rise, no respiratory distress noted.   Back: Straight leg raise in the sitting position is negative for radicular pain.   Musculoskeletal: 5/5 strength in right ankle with plantar and dorsiflexion. 5/5 strength in left ankle with plantar and dorsiflexion. 4/5 strength with right knee flexion and extension. 4/5 strength with left knee flexion and extension.   Neuro:  Decreased sensation to light touch to BLE.   Gait: Antalgic- ambulates with wheelchair.     ASSESSMENT: 79 y.o.  year old female with chronic pain, consistent with:     No diagnosis found.        PLAN:     - Previous imaging reviewed. Labs reviewed.     - ***    - Continue Robaxin 500 mg TID PRN.    - Continue Lyrica 150 mg TID.     - Continue Norco 10/325 mg TID PRN, #90, refill provided.      - The patient is here today for a refill of current pain medications and they believe these provide effective pain control and improvements in quality of life.  The patient notes no serious side effects, and feels the benefits outweigh the risks.  The patient was reminded of the pain contract that they signed previously as well as the risks and benefits of the medication including possible death.  The updated [place] [employer] prescription monitoring program was reviewed, and the patient has been found to be compliant with current treatment plan.     - UDS from 12/14/2023 reviewed and consistent.      - RTC I***    The above plan and management options were discussed at length with patient. Patient is in agreement with the above and verbalized understanding.      Theresa Freedman MD  03/14/2024

## 2024-03-19 DIAGNOSIS — N28.1 RENAL CYST: Primary | ICD-10-CM

## 2024-03-27 ENCOUNTER — TELEPHONE (OUTPATIENT)
Dept: PAIN MEDICINE | Facility: CLINIC | Age: 79
End: 2024-03-27
Payer: MEDICARE

## 2024-03-27 NOTE — TELEPHONE ENCOUNTER
----- Message from Jannette Mart sent at 3/27/2024  1:45 PM CDT -----  Regarding: P/t advice  Type: Patient Call Back    Who called: p/t     What is the request in detail: p/t is calling b/c she would like for someone to explain the procedure she is having on 03/28. She seems confused on where to go and what to do. Please call to assist.     Can the clinic reply by [facility]? No     Would the patient rather a call back or a response via [facility]? Call back     Best call back number: [phone]    Additional Information:

## 2024-03-28 ENCOUNTER — TELEPHONE (OUTPATIENT)
Dept: PAIN MEDICINE | Facility: CLINIC | Age: 79
End: 2024-03-28
Payer: MEDICARE

## 2024-03-28 ENCOUNTER — PROCEDURE VISIT (OUTPATIENT)
Dept: PAIN MEDICINE | Facility: CLINIC | Age: 79
End: 2024-03-28
Attending: ANESTHESIOLOGY
Payer: MEDICARE

## 2024-03-28 VITALS
BODY MASS INDEX: 36.43 KG/M2 | RESPIRATION RATE: 20 BRPM | SYSTOLIC BLOOD PRESSURE: 120 MMHG | DIASTOLIC BLOOD PRESSURE: 66 MMHG | HEART RATE: 82 BPM | HEIGHT: 60 IN | OXYGEN SATURATION: 100 % | TEMPERATURE: 98 F

## 2024-03-28 DIAGNOSIS — M17.12 PRIMARY OSTEOARTHRITIS OF LEFT KNEE: Primary | ICD-10-CM

## 2024-03-28 DIAGNOSIS — G89.4 CHRONIC PAIN SYNDROME: ICD-10-CM

## 2024-03-28 DIAGNOSIS — M17.11 PRIMARY OSTEOARTHRITIS OF RIGHT KNEE: Primary | ICD-10-CM

## 2024-03-28 PROCEDURE — 20611 DRAIN/INJ JOINT/BURSA W/US: CPT | Mod: PBBFAC | Performed by: ANESTHESIOLOGY

## 2024-03-28 PROCEDURE — 20611 DRAIN/INJ JOINT/BURSA W/US: CPT | Mod: S$PBB,LT,, | Performed by: ANESTHESIOLOGY

## 2024-03-28 NOTE — PROCEDURES
Patient Name: Indira Waters  MRN: [id number]    INFORMED CONSENT: The procedure, risks, benefits and options were discussed with patient. There are no contraindications to the procedure. The patient expressed understanding and agreed to proceed. The personnel performing the procedure was discussed. I verify that I personally obtained Indira's consent prior to the start of the procedure and the signed consent can be found on the patient's chart.    Procedure Date: 03/28/2024    Anesthesia: Topical    Pre Procedure diagnosis:   1. Primary osteoarthritis of left knee    2. Chronic pain syndrome        Post-Procedure diagnosis: same    Sedation: None    PROCEDURE: LEFT KNEE INTRAARTICULAR STEROID INJECTION under ultrasound guide    Patient in the seated position with right knee bending 90 degrees, the area of the left knee was prepped with chlorhexidine.  After performing time out and palpating the inferolateral aspect of the knee joint, a high-frequency linear transducer ultrasound probe was placed in the inferolateral corner of the patella, and the joint space was identified. A 27 Gauge 1.5 inch needle was slowly advance using an in-plane approach, and directed into the intraarticular space under direct visualization. After negative aspiration 3cc of bupivacaine 0.25% and 40 mg kenalog was injected in the knee joint.    No complications. Patient tolerated procedure well.    Blood Loss: Nill  Specimen: None    Holden Pereira MD

## 2024-04-01 DIAGNOSIS — N18.4 STAGE 4 CHRONIC KIDNEY DISEASE: Primary | ICD-10-CM

## 2024-04-03 ENCOUNTER — LAB VISIT (OUTPATIENT)
Dept: LAB | Facility: OTHER | Age: 79
End: 2024-04-03
Attending: INTERNAL MEDICINE
Payer: MEDICARE

## 2024-04-03 DIAGNOSIS — N18.4 STAGE 4 CHRONIC KIDNEY DISEASE: ICD-10-CM

## 2024-04-03 DIAGNOSIS — N18.4 STAGE 4 CHRONIC KIDNEY DISEASE: Primary | ICD-10-CM

## 2024-04-03 LAB
ALBUMIN SERPL BCP-MCNC: 3.5 G/DL (ref 3.5–5.2)
ANION GAP SERPL CALC-SCNC: 14 MMOL/L (ref 8–16)
BUN SERPL-MCNC: 26 MG/DL (ref 8–23)
CALCIUM SERPL-MCNC: 9.1 MG/DL (ref 8.7–10.5)
CHLORIDE SERPL-SCNC: 98 MMOL/L (ref 95–110)
CO2 SERPL-SCNC: 21 MMOL/L (ref 23–29)
CREAT SERPL-MCNC: 1.3 MG/DL (ref 0.5–1.4)
EST. GFR  (NO RACE VARIABLE): 42 ML/MIN/1.73 M^2
GLUCOSE SERPL-MCNC: 267 MG/DL (ref 70–110)
PHOSPHATE SERPL-MCNC: 3.4 MG/DL (ref 2.7–4.5)
POTASSIUM SERPL-SCNC: 4.9 MMOL/L (ref 3.5–5.1)
SODIUM SERPL-SCNC: 133 MMOL/L (ref 136–145)

## 2024-04-03 PROCEDURE — 36415 COLL VENOUS BLD VENIPUNCTURE: CPT | Performed by: INTERNAL MEDICINE

## 2024-04-03 PROCEDURE — 80069 RENAL FUNCTION PANEL: CPT | Performed by: INTERNAL MEDICINE

## 2024-04-10 DIAGNOSIS — G89.4 CHRONIC PAIN SYNDROME: ICD-10-CM

## 2024-04-10 DIAGNOSIS — E11.40 PAINFUL DIABETIC NEUROPATHY: ICD-10-CM

## 2024-04-10 NOTE — TELEPHONE ENCOUNTER
Patient requesting refill on hydrocodone  Last office visit 03-14-24   shows last refill on 03-14-24  Patient does have a pain contract on file with [facility]  Patient last UDS 12-14-23 was consistent with current therapy    CODEINE  Not Detected  Not Detected    Not Detected   Comment: INTERPRETIVE INFORMATION: Codeine, U  Positive Cutoff: 40 ng/mL  Methodology: Mass Spectrometry   MORPHINE  Not Detected  Not Detected    Not Detected   Comment: INTERPRETIVE INFORMATION:Morphine, U  Positive Cutoff: 20 ng/mL  Methodology: Mass Spectrometry   6-ACETYLMORPHINE  Not Detected  Not Detected    Not Detected   Comment: INTERPRETIVE INFORMATION:6-acetylmorphine, U  Positive Cutoff: 20 ng/mL  Methodology: Mass Spectrometry   OXYCODONE  Not Detected  Not Detected    Not Detected   Comment: INTERPRETIVE INFORMATION:Oxycodone, U  Positive Cutoff: 40 ng/mL  Methodology: Mass Spectrometry   NOROYXCODONE  Not Detected  Not Detected    Not Detected   Comment: INTERPRETIVE INFORMATION:Noroxycodone, U  Positive Cutoff: 100 ng/mL  Methodology: Mass Spectrometry   OXYMORPHONE  Not Detected  Not Detected    Not Detected   Comment: INTERPRETIVE INFORMATION:Oxymorphone, U  Positive Cutoff: 40 ng/mL  Methodology: Mass Spectrometry   NOROXYMORPHONE  Not Detected  Not Detected    Not Detected   Comment: INTERPRETIVE INFORMATION:Noroxymorphone, U  Positive Cutoff: 100 ng/mL  Methodology: Mass Spectrometry   HYDROCODONE  Present  Present    Present   Comment: INTERPRETIVE INFORMATION:Hydrocodone, U  Positive Cutoff: 40 ng/mL  Methodology: Mass Spectrometry   NORHYDROCODONE  Present  Present    Present   Comment: INTERPRETIVE INFORMATION:Norhydrocodone, U  Positive Cutoff: 100 ng/mL  Methodology: Mass Spectrometry   HYDROMORPHONE  Present  Present    Present   Comment: INTERPRETIVE INFORMATION:Hydromorphone, U  Positive Cutoff: 20 ng/mL  Methodology: Mass Spectrometry   BUPRENORPHINE  Not Detected  Not  Detected    Not Detected   Comment: INTERPRETIVE INFORMATION:Buprenorphine, U  Positive Cutoff: 5 ng/mL  Methodology: Mass Spectrometry   NORUBPRENORPHINE  Not Detected  Not Detected    Not Detected   Comment: INTERPRETIVE INFORMATION:Norbuprenorphine, U  Positive Cutoff: 20 ng/mL  Methodology: Mass Spectrometry   FENTANYL  Not Detected  Not Detected    Not Detected   Comment: INTERPRETIVE INFORMATION:Fentanyl, U  Positive Cutoff: 2 ng/mL  Methodology: Mass Spectrometry   NORFENTANYL  Not Detected  Not Detected    Not Detected   Comment: INTERPRETIVE INFORMATION:Norfentanyl, U  Positive Cutoff: 2 ng/mL  Methodology: Mass Spectrometry   MEPERIDINE METABOLITE  Not Detected  Not Detected    Not Detected   Comment: INTERPRETIVE INFORMATION:Meperidine metabolite, U  Positive Cutoff: 50 ng/mL  Methodology: Mass Spectrometry   TAPENTADOL  Not Detected  Not Detected    Not Detected   Comment: INTERPRETIVE INFORMATION:Tapentadol, U  Positive Cutoff: 100 ng/mL  Methodology: Mass Spectrometry   TAPENTADOL-O-SULF  Not Detected  Not Detected    Not Detected   Comment: INTERPRETIVE INFORMATION:Tapentadol-o-Sulf, U  Positive Cutoff: 200 ng/mL  Methodology: Mass Spectrometry   METHADONE  Negative  Not Detected    Not Detected   Comment: Presumptive negative by immunoassay. Testing by mass  spectrometry is available on request.  INTERPRETIVE INFORMATION: Methadone Screen, U  Positive Cutoff: 150 ng/mL  Methodology: Immunoassay   TRAMADOL  Negative  Not Detected    Not Detected   Comment: Presumptive negative by immunoassay. Testing by mass  spectrometry is available on request.  INTERPRETIVE INFORMATION:Tramadol Screen, U  Positive Cutoff: 100 ng/mL  Methodology: Immunoassay   AMPHETAMINE  Not Detected  Not Detected    Not Detected   Comment: INTERPRETIVE INFORMATION:Amphetamine, U  Positive Cutoff: 50 ng/mL  Methodology: Mass Spectrometry   METHAMPHETAMINE  Not Detected  Not Detected    Not Detected   Comment: INTERPRETIVE  INFORMATION:Methamphetamine, U  Positive Cutoff: 200 ng/mL  Methodology: Mass Spectrometry   MDMA- ECSTASY  Not Detected  Not Detected    Not Detected   Comment: INTERPRETIVE INFORMATION:MDMA, U  Positive Cutoff: 200 ng/mL  Methodology: Mass Spectrometry   MDA  Not Detected  Not Detected    Not Detected   Comment: INTERPRETIVE INFORMATION:MDA, U  Positive Cutoff: 200 ng/mL  Methodology: Mass Spectrometry   MDEA- Mary  Not Detected  Not Detected    Not Detected   Comment: INTERPRETIVE INFORMATION:MDEA, U  Positive Cutoff: 200 ng/mL  Methodology: Mass Spectrometry   METHYLPHENIDATE  Not Detected  Not Detected    Not Detected   Comment: INTERPRETIVE INFORMATION:Methylphenidate, U  Positive Cutoff: 100 ng/mL  Methodology: Mass Spectrometry   PHENTERMINE  Not Detected  Not Detected    Not Detected   Comment: INTERPRETIVE INFORMATION:Phentermine, U  Positive Cutoff: 100 ng/mL  Methodology: Mass Spectrometry   BENZOYLECGONINE  Negative  Not Detected    Not Detected   Comment: Presumptive negative by immunoassay. Testing by mass  spectrometry is available on request.  INTERPRETIVE INFORMATION:Cocaine Screen, U  Positive Cutoff: 150 ng/mL  Methodology: Immunoassay   ALPRAZOLAM  Not Detected  Not Detected    Not Detected   Comment: INTERPRETIVE INFORMATION:Alprazolam, U  Positive Cutoff: 40 ng/mL  Methodology: Mass Spectrometry   ALPHA-OH-ALPRAZOLAM  Not Detected  Not Detected    Not Detected   Comment: INTERPRETIVE INFORMATION:Alpha-OH-Alprazolam, U  Positive Cutoff: 20 ng/mL  Methodology: Mass Spectrometry   CLONAZEPAM  Not Detected  Not Detected    Not Detected   Comment: INTERPRETIVE INFORMATION:Clonazepam, U  Positive Cutoff: 20 ng/mL  Methodology: Mass Spectrometry   7-AMINOCLONAZEPAM  Not Detected  Not Detected    Not Detected   Comment: INTERPRETIVE INFORMATION:7-Aminoclonazepam, U  Positive Cutoff: 40 ng/mL  Methodology: Mass Spectrometry   DIAZEPAM  Not Detected  Not Detected    Not Detected   Comment: INTERPRETIVE  INFORMATION:Diazepam, U  Positive Cutoff: 50 ng/mL  Methodology: Mass Spectrometry   NORDIAZEPAM  Not Detected  Not Detected    Not Detected   Comment: INTERPRETIVE INFORMATION:Nordiazepam, U  Positive Cutoff: 50 ng/mL  Methodology: Mass Spectrometry   OXAZEPAM  Not Detected  Not Detected    Not Detected   Comment: INTERPRETIVE INFORMATION:Oxazepam, U  Positive Cutoff: 50 ng/mL  Methodology: Mass Spectrometry   TEMAZEPAM  Not Detected  Not Detected    Not Detected   Comment: INTERPRETIVE INFORMATION:Temazepam, U  Positive Cutoff: 50 ng/mL  Methodology: Mass Spectrometry   Lorazepam  Not Detected  Not Detected    Not Detected   Comment: INTERPRETIVE INFORMATION:Lorazepam, U  Positive Cutoff: 60 ng/mL  Methodology: Mass Spectrometry   MIDAZOLAM  Not Detected  Not Detected    Not Detected   Comment: INTERPRETIVE INFORMATION:Midazolam, U  Positive Cutoff: 20 ng/mL  Methodology: Mass Spectrometry   ZOLPIDEM  Not Detected  Not Detected    Not Detected   Comment: INTERPRETIVE INFORMATION:Zolpidem, U  Positive Cutoff: 20 ng/mL  Methodology: Mass Spectrometry   BARBITURATES  Negative  Not Detected    Not Detected   Comment: Presumptive negative by immunoassay. Testing by mass  spectrometry is available on request.  INTERPRETIVE INFORMATION:Barbiturates Screen, U  Positive Cutoff: 200 ng/mL  Methodology: Immunoassay   Creatinine, Urine 20.0 - 400.0 mg/dL <20.0 24.0 R 21.1

## 2024-04-11 RX ORDER — HYDROCODONE BITARTRATE AND ACETAMINOPHEN 10; 325 MG/1; MG/1
1 TABLET ORAL EVERY 8 HOURS PRN
Qty: 90 TABLET | Refills: 0 | Status: SHIPPED | OUTPATIENT
Start: 2024-04-11 | End: 2024-05-11

## 2024-04-16 ENCOUNTER — PATIENT MESSAGE (OUTPATIENT)
Dept: PAIN MEDICINE | Facility: CLINIC | Age: 79
End: 2024-04-16
Payer: MEDICARE

## 2024-04-16 DIAGNOSIS — E08.42 DIABETIC POLYNEUROPATHY ASSOCIATED WITH DIABETES MELLITUS DUE TO UNDERLYING CONDITION: ICD-10-CM

## 2024-04-16 DIAGNOSIS — G89.4 CHRONIC PAIN DISORDER: ICD-10-CM

## 2024-04-16 DIAGNOSIS — G89.4 CHRONIC PAIN SYNDROME: ICD-10-CM

## 2024-04-16 RX ORDER — METHOCARBAMOL 500 MG/1
TABLET, FILM COATED ORAL
Qty: 90 TABLET | Refills: 2 | Status: SHIPPED | OUTPATIENT
Start: 2024-04-16

## 2024-04-18 ENCOUNTER — PROCEDURE VISIT (OUTPATIENT)
Dept: PAIN MEDICINE | Facility: CLINIC | Age: 79
End: 2024-04-18
Attending: ANESTHESIOLOGY
Payer: MEDICARE

## 2024-04-18 VITALS
RESPIRATION RATE: 18 BRPM | HEIGHT: 60 IN | TEMPERATURE: 98 F | SYSTOLIC BLOOD PRESSURE: 175 MMHG | HEART RATE: 84 BPM | WEIGHT: 185.19 LBS | DIASTOLIC BLOOD PRESSURE: 82 MMHG | BODY MASS INDEX: 36.36 KG/M2 | OXYGEN SATURATION: 100 %

## 2024-04-18 DIAGNOSIS — M17.11 PRIMARY OSTEOARTHRITIS OF RIGHT KNEE: Primary | ICD-10-CM

## 2024-04-18 PROCEDURE — 20611 DRAIN/INJ JOINT/BURSA W/US: CPT | Mod: PBBFAC | Performed by: ANESTHESIOLOGY

## 2024-04-18 PROCEDURE — 20611 DRAIN/INJ JOINT/BURSA W/US: CPT | Mod: S$PBB,RT,, | Performed by: ANESTHESIOLOGY

## 2024-04-18 NOTE — PROCEDURES
Patient Name: Indira Waters  MRN: [id number]    INFORMED CONSENT: The procedure, risks, benefits and options were discussed with patient. There are no contraindications to the procedure. The patient expressed understanding and agreed to proceed. The personnel performing the procedure was discussed. I verify that I personally obtained Indira's consent prior to the start of the procedure and the signed consent can be found on the patient's chart.    Procedure Date: 04/18/2024    Anesthesia: Topical    Pre Procedure diagnosis:   1. Primary osteoarthritis of right knee      Post-Procedure diagnosis: same      Sedation: None    PROCEDURE: RIGHT KNEE INTRAARTICULAR Steroid INJECTION under ultrasound guide    Patient in the supine position with right knee bending 45 degrees, the area of the right knee was prepped with chlorhexidine .  After performing time out and palpating the superior-lateral pattellar aspect A high-frequency linear transducer ultrasound probe is placed in the superolateral corner of the patella, directed medially toward the patellofemoral joint space  .  A 25 Gauge 1.5 inch needle was slowly advance Using an in-plane approach, and directed into the suprapatellar joint space towards the knee joint. After negative aspiration 5 cc of bupivicaine 0.25% and 40 mg kenalog was injected in the Knee joint.      No complications. Patient tolerated procedure well.    Blood Loss: Nill  Specimen: None    Holden Pereria MD

## 2024-04-24 ENCOUNTER — PATIENT MESSAGE (OUTPATIENT)
Dept: FAMILY MEDICINE | Facility: CLINIC | Age: 79
End: 2024-04-24
Payer: MEDICARE

## 2024-05-04 DIAGNOSIS — E78.1 HYPERTRIGLYCERIDEMIA: ICD-10-CM

## 2024-05-04 DIAGNOSIS — K31.84 GASTROPARESIS: ICD-10-CM

## 2024-05-05 NOTE — TELEPHONE ENCOUNTER
No care due was identified.  [facility] Embedded Care Due Messages. Reference number: [id number].   5/04/2024 9:48:22 PM CDT

## 2024-05-06 PROBLEM — K62.5 BRBPR (BRIGHT RED BLOOD PER RECTUM): Status: RESOLVED | Noted: 2024-01-31 | Resolved: 2024-05-06

## 2024-05-06 RX ORDER — METOCLOPRAMIDE 5 MG/1
TABLET ORAL
Qty: 30 TABLET | Refills: 3 | Status: SHIPPED | OUTPATIENT
Start: 2024-05-06 | End: 2024-06-12

## 2024-05-06 RX ORDER — FENOFIBRATE 134 MG/1
134 CAPSULE ORAL
Qty: 90 CAPSULE | Refills: 3 | Status: SHIPPED | OUTPATIENT
Start: 2024-05-06 | End: 2025-05-06

## 2024-05-06 NOTE — TELEPHONE ENCOUNTER
Refill Routing Note   Medication(s) are not appropriate for processing by [facility] for the following reason(s):        Outside of protocol    [facility] action(s):  Route               Appointments  past 12m or future 3m with PCP    Date Provider   Last Visit   2/27/2024 Chun Zapata MD   Next Visit   5/4/2024 Chun Zapata MD   ED visits in past 90 days: 0        Note composed:9:13 AM 05/06/2024

## 2024-05-07 DIAGNOSIS — M81.0 AGE-RELATED OSTEOPOROSIS WITHOUT CURRENT PATHOLOGICAL FRACTURE: ICD-10-CM

## 2024-05-07 RX ORDER — TERIPARATIDE 250 UG/ML
20 INJECTION, SOLUTION SUBCUTANEOUS DAILY
Qty: 2.48 ML | Refills: 5 | Status: ACTIVE | OUTPATIENT
Start: 2024-05-07

## 2024-05-10 ENCOUNTER — OFFICE VISIT (OUTPATIENT)
Dept: PAIN MEDICINE | Facility: CLINIC | Age: 79
End: 2024-05-10
Payer: MEDICARE

## 2024-05-10 VITALS
OXYGEN SATURATION: 98 % | HEART RATE: 79 BPM | DIASTOLIC BLOOD PRESSURE: 64 MMHG | SYSTOLIC BLOOD PRESSURE: 140 MMHG | TEMPERATURE: 98 F | RESPIRATION RATE: 18 BRPM | WEIGHT: 185.19 LBS | BODY MASS INDEX: 36.17 KG/M2

## 2024-05-10 DIAGNOSIS — M17.12 PRIMARY OSTEOARTHRITIS OF LEFT KNEE: ICD-10-CM

## 2024-05-10 DIAGNOSIS — E11.40 PAINFUL DIABETIC NEUROPATHY: ICD-10-CM

## 2024-05-10 DIAGNOSIS — M51.36 DDD (DEGENERATIVE DISC DISEASE), LUMBAR: ICD-10-CM

## 2024-05-10 DIAGNOSIS — G89.4 CHRONIC PAIN SYNDROME: Primary | ICD-10-CM

## 2024-05-10 DIAGNOSIS — M47.816 LUMBAR SPONDYLOSIS: ICD-10-CM

## 2024-05-10 PROCEDURE — 99999PBSHW PR PBB SHADOW TECHNICAL ONLY FILED TO HB: Mod: JZ,PBBFAC,,

## 2024-05-10 PROCEDURE — 99999 PR PBB SHADOW E&M-EST. PATIENT-LVL V: CPT | Mod: PBBFAC,,, | Performed by: NURSE PRACTITIONER

## 2024-05-10 PROCEDURE — 99215 OFFICE O/P EST HI 40 MIN: CPT | Mod: PBBFAC,25 | Performed by: NURSE PRACTITIONER

## 2024-05-10 PROCEDURE — 99213 OFFICE O/P EST LOW 20 MIN: CPT | Mod: 25,S$PBB,, | Performed by: NURSE PRACTITIONER

## 2024-05-10 PROCEDURE — 64999 UNLISTED PX NERVOUS SYSTEM: CPT | Mod: PBBFAC | Performed by: NURSE PRACTITIONER

## 2024-05-10 PROCEDURE — 64999 UNLISTED PX NERVOUS SYSTEM: CPT | Mod: S$PBB,,, | Performed by: NURSE PRACTITIONER

## 2024-05-10 RX ADMIN — CAPSAICIN 1 PATCH: KIT at 03:05

## 2024-05-10 NOTE — PROGRESS NOTES
Chronic patient Established Note (Follow up visit)        Interval History 5/10/2024:  The patient returns to clinic today for follow up of pain. She is s/p left knee steroid injection on 3/28/2024. She reports limited relief. She is s/p right knee steroid injection on 4/18/2024. She reports significant relief of her right knee pain. She continues to report neuropathic pain into her bilateral feet. She describes this pain as burning and tingling in nature. She does have benefit with Qutenza patches. She is taking Lyrica. She also takes Norco as needed with benefit. She denies any adverse effects. She denies any other health changes. Her pain today is 9/10.    Interval History 3/14/2024:  Indira Waters returns today for f/u of her painful neuropathy in the feet. She was last seen in January for this complaint. Qutenza patches were administered at that time. We also continued medication management with Robaxin, Lyrica, and Norco. She reports incredible relief of neuropathic symptoms with qutenza injections. Significantly improves walking ability. Primary complaint today is left knee pain. Started several years ago. Has been mostly intermittent over the years, but has been more constant and severe over the past 2 months. Localized over medial joint line. No falls. Pain today is an 8/10. Continues Norco 10/325 mg TID, consistently. No adverse effects. Feels like medications continue to help with functional mobility and ADL ability..     Interval History 1/26/2024:  The patient returns to clinic today for foot pain. She continues to report neuropathic pain into her feet. She describes this as burning in nature. She does find benefit with Qutenza patches. She denies any rash or increased pain after the patch application. She continues to take Lyrica, Robaxin, and Norco with benefit. She denies any adverse effects. She denies any other health changes. Her pain today is 8/10.    Interval History 12/14/2023:  The  patient returns to clinic today for follow up of back and leg pain. She continues to report low back pain that radiates into both legs. She continues to report bilateral neuropathic pain into the feet. She states that today is a bad day. She is having more left foot and toe pain. Her pain is worse with prolonged standing and walking. She does have benefit with Qutenza patches. She is taking Lyrica, Robaxin, and Norco with benefit and without adverse effects. She denies other health changes. Her pain today is 8/10.    Interval History 10/11/2023:  The patient returns to clinic today for follow up of bilateral foot pain. She continues to report neuropathic pain to her feet. Her pain is worse with standing and walking. She does have benefit with Qutenza patches. She also takes Lyrica, Robaxin, and Norco. She denies any adverse effects. She denies any other health changes. Her pain today is 8/10.    Interval History 9/15/2023:  The patient returns to clinic today for follow up of back and leg pain. She did have benefit with Qutenza patches. She continues to report low back pain with intermittent radicular leg pain. She continues to report neuropathic pain into her feet. She continues to take Lyrica and Robaxin with benefit. She also takes Norco as needed with benefit. She denies any adverse effects. She denies any other health changes. Her pain today is 9/10.    Interval History 6/27/2023:  The patient returns to clinic today for follow up of pain. She continues to report nerve pain to her feet bilaterally. This pain is worse with standing and walking. She is taking Lyrica and Robaxin. She also takes Norco as needed with benefit and without adverse effects. She denies any other health changes. Her pain today is 9/10.    Interval History 6/16/2023:  The patient returns to clinic today for follow up of back and leg pain. She continues to report low back pain. She also reports diabetic neuropathy to her bilateral feet. Her  pain is worse with standing and walking. She has tried Qutenza patches with benefit in the past. She continues to take Robaxin and Lyrica. She also takes Norco as needed with benefit and without adverse effects. She denies any other health changes. Her pain today is 10/10.    Interval History 3/17/2023:  Mrs Watres presents for follow up of chronic, continuous neuropathic pain to [facility]. She is s/p Nevro SCS trial and unfortunately she did not get the relief she was hoping for and 50% at best relief but this was not consistent. She has no constitutional s/s concerning for infection and denies newer neurological changes since trial. She continues with medication mgt and states Qutenza application has been providing significant benefit.     Interval History 2/10/2023:  Mrs Waters presents for follow up of chronic lower back painn and radicular pain in conjunction with PDN to bilateral feet. She is doing fair with medication mgt of Norco, Robaxin, and Lyrica and does need refill at this time. She denies SE of medications. She is also here for Qutenza application today. She is scheduled to have upcoming SCS trial with [facility] to aslo address her PDN.     Interval History 12/13/2022:  Mrs Waters presents for follow up of chronic pain. She is ready to repeat Qutenza application as it has been >91 days and she had significant improvement of symptoms of PDN. She recently had repeat A1C and just above 10.0 but is decreasing. She continues to take medication with benefit and denies SE of medication. Medication regimen include Norco 10/325mg TID, Robaxin 500mg and Lyrica 150mg.     Interval History 10/12/2022:  Mrs Waters presents for follow up of chronic neuropathy. She is s/p qutenza patch placement with improved functioning and neuropathy control. Unfortunately her A1C was above 11 which inhibits her from Nevro SCS trial at this time. She is eager to have SCS trial. She denies new areas of pain or neurological changes. She continued  to take  Norco 10/325mg TID, Robaxin 500mg and Lyrica 150mg TID with benefit and denies significant SE of medications.     Interval History 9/8/2022:  Mrs Waters presents for follow up of chronic lower back painn and radicular pain in conjunction with PDN to bilateral feet. She is doing fair with medication mgt of Norco, Robaxin, and Lyrica and does need refill at this time. She denies SE of medications. She is patiently awaiting her A1C to become lower than 10 to proceed with SCS trial.     Interval History 8/12/2022:  Mrs Waters presents for delayed FU. She has had continuous pain to lumbar and radicular pain but also peripheral neuropathy complaints. Over interval she has had CVA but doing fair. Her A1C has unfortunately elevated above 10 at this time and SCS trial placed on hold but phychiatric evaluation complete. She continues to take Norco 10/325mg TID, Robaxin 500mg TID and Lyrica 150mg TID with some benefit and denies SE of medications. No s/s concerning for cauda equina.     Interval History 4/12/2022:  Patient returns to clinic today for follow-up. Her neuropathic pain continues to be an issue for her, but she says that she is able to manage. She is taking Norco 10-325mg TID, Robaxin 500mg TID, and Lyrica 150mg TID without any adverse side effects. She denies any changes in her symptoms. She would like to proceed with SCS trial. Discussed last time that her HbA1c should be <10, was 9.8 on most recent labs. Seen by psychology and cleared for SCS.     Interval History 2/14/2022:  Mrs Waters presents for follow up. Pt states overall neuropathy continues. Since last visit she has been stable with medication mgt and takign Norco 10/325mg TID, Robaxin 500mg TID and Lyrica 150mg TID. She denies new areas of pain or neurological changes. Medication adequate to control pain without adverse SE.      Interval History 12/21/2021:  Pt presents for urgent evaluation s/p fall in kitchen last night. Pt unsure of LOC or head  trauma but states some amnesia of events. Pt is having left knee pain, B ankle pain L>R and lower back/buttock pain. Daughter further states tremor like activity last night. During visit daughter asked for bedside commode due to inability to ambulate.  Pt during visit appeared somnolent, pale and began vomiting. Discussed going to ER for further evaluation.      Interval History 12/14/2021:  Mrs Waters presents for follow up of chronic pain complaints. She is hopeful she will have A1C lower soon to move forward with SCS. She is doing fair with medication mgt alone at this time and denies SE of medications. Pt is currently taking Norco 10/325mg TID PRN, Lyrica 150mg TID, and Robaxin 500mg TID. She denies new areas of pain or neurological changes.      Interval History 10/14/2021:  The Pt presents for follow up and s/p B Lumbar sympathetic blocks. Pt states this has done well but ready to proceed with SCS trial. She is aware A1C must be under tight control and Pt and daughter re-iterate knowing this. Pt also mentions DKA admission and diagnosis of vasculitis as well over interval. Pt continues to take hydrocodone 10/325 mg TID PRN, Lyrica 150 mg TID, and Robaxin 500mg TID. She denies any SE of medication, denies new neurological changes.      Interval Hx 09/16/2021  Indira Waters presents to the clinic for a follow-up appointment for f/u after bilateral lumbar sympathetic block on 8/25/21.Patient reports >50% relief after lumbar sympathetic block that lasted 2 weeks when she then developed a UTI/DKA, was admitted to the hospital and pain recurred.  Current pain intensity is 8/10.     Interval History 8/12/2021:  Indira Waters presents to the clinic for a follow-up appointment for BLE diabetic neuropathy, painful. Continues with Norco 10/325mg, Lyrica 150mg TID, Robaxin 750mg daily PRN. She had to cancel previously scheduled lumbar sympathetic block 2/2 lithotripsy for painful kidney stones, which have now  "passed. She still has intermittent pain with urination but overall that pain is improving. She had to cancel previous angiogram for this same reason - this has not been rescheduled yet. She denies any change in her LE pain. Denies any new neurological sxs in BLEs.     Interval History 6/10/21:  Indira Waters presents to the clinic for a 2 week follow-up appointment from lumbar sympathetic nerve block in early May. She reports minimal relief from this intervention, but did note that it helped some, briefly. Since the last visit, Indira Waters states the pain has been persistant. Current pain intensity is 10/10. Patient has chronic generalized diffuse pain, most pronounced in her BL lower extremities 2/2 diabetic neuropathy. HSe states that the pain is a little better than at her last visit. Most days are 10/10, but some days are better than others. Her pain is aggravated by exertion, walking, or sitting/standing for extended periods of time. He pain is mildly improved by rest and medications. She is currently taking Lyrica 150 PO TID, Zoloft, and Norco 10-325mg TID PRN. She states that the pain meds are helping but she is still constantly in pain and unable to participate in her ADLs. She has now established care with PCP for assistance w/ poorly controlled T2DM, last A1c 11.4. She has not yet established care w/ Rheumatology for fibromyalgia management.    Interval HPI 4/15/21:  Patient returns for follow up of chronic generalized diffuse pain. At the last visit, had EMG (not yet completed), lumbar and hip x-ray imaging ordered. She was also referred to Rheumatology for fibromyalgia management and referral to PCP for DM2 management and weaning of zoloft for transition to cymbalta for treatment of neuropathy. She had her Lyrica increased to 150mg PO TID, and prescribed Norco 10mg TID with opioid contract signed. She has been taking Norco 10mg TID and it has been helping her "bad" pains but does not take all of her " pain away. She has not yet been set up with her new PCP or rheumatologist yet for fibromyalgia. Still continues to have generalized pain everywhere including feet, hips, legs, lower and upper back, neck and shoulder pain. Continues to have neuropathy in the bilateral lower extremities due to uncontrolled DM2.    Initial Visit 3/11/21:  Indira Waters presents to the clinic for the evaluation of chronic pain. Complaining of pain everywhere including feet, legs, hips, lower and upper back, neck, shoulder. Pain started 5+ years ago. Pain 10/10 at worse. She was referred here by her PCP Dr. Ramos who she has been seeing for over 6 years. She states her pain is aggravated by any physical activity/movement. She takes lyrica, robaxin, and Norco 10 TID with mild relief of pain. Per patient, she was referred here by her PCP for medication refill. She has not tried physical therapy for several years, she states it did not help last time she was in PT. She says she is trying to exercise daily by doing yoga. She walks with a walker. She has numerous comorbidities including DM2 (Last A1C 9+ per prior family medicine note in Jan 2021), fibromyalgia, lupus, DDD. She states she would like to find a new PCP as she no longer lives near her old one. Patient denies night fever/night sweats, urinary incontinence, bowel incontinence and significant weight loss.      Pain Disability Index Review:      5/10/2024     2:47 PM 4/18/2024     3:34 PM 3/28/2024     1:33 PM   Last 3 PDI Scores   Pain Disability Index (PDI) 49 24 8     Pain Medications:  Norco 10-325mg TID, Robaxin 500mg TID, and Lyrica 150mg TID    Opioid Contract: yes     report:  Reviewed and consistent with medication use as prescribed.    Pain Procedures:    - reports possible back injection 10+ years ago  - Lumbar sympathetic nerve block 05/05/21 - Dr. Pereira - minimal relief    Physical Therapy/Home Exercise: no     Imaging:   Hip X-ray 4/7/21  FINDINGS:  Osseous  structures appear intact without evidence of fracture or osseous destructive process.  No apparent dislocation.     Modest degenerative change or significant joint space narrowing.     Lumbar X-ray 4/7/21  FINDINGS:  Diffuse bony demineralization.  Vertebral bodies are normal in height without evidence of fracture.  No pars defects.     Normal sagittal alignment is preserved.  No spondylolisthesis. No abnormal translation with flexion and extension.     Intervertebral disc heights are well maintained.  Mild facet arthropathy in the lower lumbar spine.     Mild scattered vascular calcification.     Impression:     No evidence of fracture or malalignment.     Mild facet arthropathy in the lower lumbar spine.     Diffuse bony demineralization.  Consider correlation with DEXA.    Allergies:   Review of patient's allergies indicates:   Allergen Reactions    Bleach (sodium hypochlorite) Shortness Of Breath    Nitrofurantoin macrocrystalline Anaphylaxis    Lipitor [atorvastatin] Diarrhea and Rash    Nsaids (non-steroidal anti-inflammatory drug)      Tolerates aspirin      Pcn [penicillins]     Toradol [ketorolac]        Current Medications:   Current Outpatient Medications   Medication Sig Dispense Refill    acetaminophen (TYLENOL) 500 MG tablet Take 2 tablets (1,000 mg total) by mouth every 8 (eight) hours as needed for Pain.  0    allopurinoL (ZYLOPRIM) 300 MG tablet TAKE ONE TABLET BY MOUTH EVERY DAY 90 tablet 1    aspirin (ECOTRIN) 81 MG EC tablet Take 1 tablet (81 mg total) by mouth once daily. 30 tablet 0    atorvastatin (LIPITOR) 40 MG tablet Take 40 mg by mouth every evening.      blood sugar diagnostic Strp To check BG 6 times daily, to use with insurance preferred meter 200 each 11    blood-glucose meter kit To check BG 6 times daily, to use with insurance preferred meter 1 each 0    blood-glucose meter,continuous (DEXCOM G7 ) Misc Use as directed. 1 each 0    blood-glucose sensor (DEXCOM G7 SENSOR) Nicole  Change every 10 days. 3 each 11    cloNIDine (CATAPRES) 0.1 MG tablet Take 1 tablet (0.1 mg total) by mouth 2 (two) times daily. 180 tablet 3    dicyclomine (BENTYL) 10 MG capsule Take 10 mg by mouth 3 (three) times daily.      evolocumab (REPATHA SURECLICK) 140 mg/mL PnIj Inject 1 mL (140 mg total) into the skin every 14 (fourteen) days. 2 each 11    fenofibrate micronized (LOFIBRA) 134 MG Cap Take 1 capsule (134 mg total) by mouth daily with breakfast. 90 capsule 3    furosemide (LASIX) 40 MG tablet TAKE ONE TABLET BY MOUTH EVERY DAY 90 tablet 1    HYDROcodone-acetaminophen (NORCO)  mg per tablet Take 1 tablet by mouth every 8 (eight) hours as needed for Pain. 90 tablet 0    hydrocortisone (ANUSOL-HC) 2.5 % rectal cream Procto-Med HC 2.5 % topical cream perineal applicator      insulin aspart U-100 (NOVOLOG FLEXPEN U-100 INSULIN) 100 unit/mL (3 mL) InPn pen Inject 14 Units into the skin 3 (three) times daily with meals. + meal correction scale. Max 40 units 30 mL 3    insulin detemir U-100, Levemir, (LEVEMIR FLEXTOUCH U100 INSULIN) 100 unit/mL (3 mL) InPn pen Inject 20 Units into the skin once daily AND 18 Units every evening. 30 mL 3    ipratropium-albuteroL (COMBIVENT)  mcg/actuation inhaler Inhale 1 puff into the lungs by mouth every 6 (six) hours. 4 g 0    lancets Misc To check BG 6 times daily, to use with insurance preferred meter 200 each 11    losartan (COZAAR) 50 MG tablet Take 1 tablet (50 mg total) by mouth once daily. 90 tablet 3    methocarbamoL (ROBAXIN) 500 MG Tab TAKE ONE TABLET BY MOUTH 3 TIMES DAILY AS NEEDED FOR MUSCLE SPASMS 90 tablet 2    metoclopramide HCl (REGLAN) 5 MG tablet TAKE ONE TABLET BY MOUTH FOUR TIMES DAILY AS NEEDED FOR NAUSEA PREVENTION 30 tablet 3    metoprolol succinate (TOPROL-XL) 100 MG 24 hr tablet Take 1 tablet (100 mg total) by mouth once daily. 90 tablet 3    mupirocin (BACTROBAN) 2 % ointment Apply topically 3 (three) times daily as needed (skin breakdown).  "22 g 3    NIFEdipine (PROCARDIA-XL) 30 MG (OSM) 24 hr tablet Take 1 tablet (30 mg total) by mouth 2 (two) times a day. 180 tablet 3    nitroGLYCERIN (NITROSTAT) 0.4 MG SL tablet Place 1 tablet (0.4 mg total) under the tongue every 5 (five) minutes as needed for Chest pain. 25 tablet 11    pantoprazole (PROTONIX) 40 MG tablet Take 1 tablet (40 mg total) by mouth once daily. 30 tablet 0    pen needle, diabetic 31 gauge x 1/4" Ndle 1 each by Misc.(Non-Drug; Combo Route) route 5 (five) times daily. 500 each 3    pioglitazone (ACTOS) 15 MG tablet Take 15 mg by mouth once daily.      pregabalin (LYRICA) 150 MG capsule Take 1 capsule (150 mg total) by mouth 4 (four) times daily. TAKE ONE CAPSULE BY MOUTH 3 TIMES DAILY 120 capsule 2    senna-docusate 8.6-50 mg (PERICOLACE) 8.6-50 mg per tablet Take 1 tablet by mouth 2 (two) times daily as needed for Constipation. 60 tablet 0    sertraline (ZOLOFT) 100 MG tablet Take 1 tablet (100 mg total) by mouth once daily. 90 tablet 3    teriparatide 20 mcg/dose (620mcg/2.48mL) PnIj Inject 20 mcg into the skin once daily. 2.48 mL 5    triamcinolone acetonide 0.1% (KENALOG) 0.1 % cream Apply to affected areas of body twice daily as needed rash. Do not use on face, underarms, or groin. 454 g 0     Current Facility-Administered Medications   Medication Dose Route Frequency Provider Last Rate Last Admin    capsaicin-skin cleanser patch 1 patch  1 patch Topical (Top) 1 time in Clinic/HOD Elvira Hylton NP           REVIEW OF SYSTEMS:    GENERAL:  No weight loss, malaise or fevers.  HEENT:  Negative for frequent or significant headaches.  NECK:  Negative for lumps, goiter, pain and significant neck swelling.  RESPIRATORY:  Negative for cough, wheezing or shortness of breath.  CARDIOVASCULAR:  Negative for chest pain, leg swelling or palpitations. HTN  GI:  Negative for abdominal discomfort, blood in stools or black stools or change in bowel habits.  MUSCULOSKELETAL:  See HPI.  SKIN:  " Negative for lesions, rash, and itching.  ENDO: Diabetes  PSYCH:  Negative for sleep disturbance, mood disorder and recent psychosocial stressors.  HEMATOLOGY/LYMPHOLOGY:  Negative for prolonged bleeding, bruising easily or swollen nodes.  NEURO:   No history of headaches, syncope, paralysis, seizures or tremors.  All other reviewed and negative other than HPI.    Past Medical History:  Past Medical History:   Diagnosis Date    Arthritis     Back pain     Cancer     ovarian    Cervical cancer     Coronary artery disease     Depression     Diabetes mellitus     Fibromyalgia     Heart attack     History of MI (myocardial infarction)     Hyperlipidemia     Hypertension     Lupus     Stroke     slight left sided weakness       Past Surgical History:  Past Surgical History:   Procedure Laterality Date    APPENDECTOMY       SECTION      2    CORONARY ANGIOGRAPHY N/A 2022    Procedure: ANGIOGRAM, CORONARY ARTERY;  Surgeon: Jose L Méndez MD;  Location: [facility] CATH LAB;  Service: Cardiology;  Laterality: N/A;    HYSTERECTOMY      with USO for cervical cancer    INJECTION OF ANESTHETIC AGENT AROUND NERVE Bilateral 2021    Procedure: BLOCK, NERVE, SYMPATHIC;  Surgeon: Holden Pereira MD;  Location: [facility] PAIN MGT;  Service: Pain Management;  Laterality: Bilateral;    INJECTION OF ANESTHETIC AGENT AROUND NERVE N/A 2021    Procedure: BLOCK, NERVE, SYMPATHETIC  need consent;  Surgeon: Holden Pereira MD;  Location: [facility] PAIN MGT;  Service: Pain Management;  Laterality: N/A;    YARED LINK,SWALLOW FUNCTION,CINE/VIDEO RECORD  2021         TONSILLECTOMY      TRIAL OF SPINAL CORD NERVE STIMULATOR N/A 3/8/2023    Procedure: LUMBAR SPINAL CORD STIMULATOR TRIAL NEVRO REP PATIENT STATES SHE NO LONGER TAKES PLAVIX;  Surgeon: Holden Pereira MD;  Location: [facility] PAIN MGT;  Service: Pain Management;  Laterality: N/A;       Family History:  Family History   Problem Relation Name Age of Onset    COPD Mother       Lupus Mother      Hernia Mother      Uterine cancer Mother          vs cervical cancer    Ovarian cancer Mother      Diabetes Father      Coronary artery disease Father      Colon cancer Maternal Grandmother          in her 50's       Social History:  Social History     Socioeconomic History    Marital status:    Tobacco Use    Smoking status: Former     Current packs/day: 0.00     Types: Cigarettes     Quit date: 11/2020     Years since quitting: 3.5     Passive exposure: Past    Smokeless tobacco: Never   Substance and Sexual Activity    Alcohol use: Not Currently     Comment: occasionally    Drug use: Yes     Types: Hydrocodone     Comment: three times a day    Sexual activity: Not Currently   Social History Narrative    Lives with daughter. .      Social Determinants of Health     Financial Resource Strain: Low Risk  (2/27/2024)    Overall Financial Resource Strain (CARDIA)     Difficulty of Paying Living Expenses: Not hard at all   Food Insecurity: Unknown (2/27/2024)    Hunger Vital Sign     Worried About Running Out of Food in the Last Year: Patient declined     Ran Out of Food in the Last Year: Never true   Transportation Needs: No Transportation Needs (2/27/2024)    PRAPARE - Transportation     Lack of Transportation (Medical): No     Lack of Transportation (Non-Medical): No   Physical Activity: Insufficiently Active (2/27/2024)    Exercise Vital Sign     Days of Exercise per Week: 2 days     Minutes of Exercise per Session: 10 min   Stress: No Stress Concern Present (2/27/2024)    [employer] - Occupational Stress Questionnaire     Feeling of Stress : Not at all   Housing Stability: Unknown (2/27/2024)    Housing Stability Vital Sign     Unable to Pay for Housing in the Last Year: No     Unstable Housing in the Last Year: No       OBJECTIVE:    BP (!) 140/64   Pulse 79   Temp 98.2 °F (36.8 °C)   Resp 18   Wt 84 kg (185 lb 3 oz)   SpO2 98%   BMI 36.17  kg/m²     PHYSICAL EXAMINATION:     General appearance: Well appearing, in no acute distress, alert and oriented x3.  Psych:  Mood and affect appropriate.  Skin: Skin color, texture, turgor normal, no rashes or lesions, in both upper and lower body.  Head/face:  Atraumatic, normocephalic.  Pulm: Symmetric chest rise, no respiratory distress noted.   Back: Straight leg raise in the sitting position is negative for radicular pain.   Musculoskeletal: There is pain with palpation over medial and lateral joint line of left knee. 5/5 strength in right ankle with plantar and dorsiflexion. 5/5 strength in left ankle with plantar and dorsiflexion. 4/5 strength with right knee flexion and extension. 4/5 strength with left knee flexion and extension.   Neuro:  Decreased sensation to light touch to BLE.   Gait: Antalgic- ambulates with wheelchair.     ASSESSMENT: 79 y.o. year old female with chronic pain, consistent with:     1. Chronic pain syndrome        2. Painful diabetic neuropathy        3. Primary osteoarthritis of left knee        4. Lumbar spondylosis        5. DDD (degenerative disc disease), lumbar                PLAN:     - Previous imaging reviewed. Labs reviewed.     - She is s/p right knee steroid injection with benefit.     - Schedule for left knee Synvisc One injection in office.     - Qutenza patch as per below.     - Continue Robaxin 500 mg TID PRN.    - Continue Lyrica 150 mg TID.     - Continue Norco 10/325 mg TID PRN, #90, refill provided.      - The patient is here today for a refill of current pain medications and they believe these provide effective pain control and improvements in quality of life.  The patient notes no serious side effects, and feels the benefits outweigh the risks.  The patient was reminded of the pain contract that they signed previously as well as the risks and benefits of the medication including possible death.  The updated [place] [employer] prescription monitoring  program was reviewed, and the patient has been found to be compliant with current treatment plan.     - UDS from 12/14/2023 reviewed and consistent.  Repeat UDS next visit.     - RTC in 3 months.      The above plan and management options were discussed at length with patient. Patient is in agreement with the above and verbalized understanding.    Elvira Hylton NP  05/10/2024        PATIENT NAME: Indira Waters        MRN: [id number]           Diagnosis: Painful Diabetic Neuropathy E13.42     Postprocedural Diagnosis: Same     Complications: None     Informed Consent:  The patient's condition and proposed procedures, risks (including complications of nerve damage, skin irritation, infection, and failure of pain relief), and alternatives were discussed with the patient or responsible party.  The patient's/responsible party's questions were answered.  The patient/responsible party appeared to understand and chose to proceed.       Procedural Pause:  A procedural pause verifying correct patient, medical record number, allergies, medications to be administered, current vital signs, and proper application site was performed immediately prior to beginning the procedure.     Procedure in Detail:  The patient was placed in a supine position. 4 patches were used for the procedure today.  The patches were applied to the target area and secured with tape.  A coban was used to further secure the patches in place.  The patient was allowed to rest in a comfortable position, and monitored by staff every 10-15 minutes to ensure comfort. The patches remained in place for 30 minutes.  The patches were then removed and the cleaning gel was applied and allowed to sit for an additional 60 seconds, after which the area was wiped clean.      The patient was then discharged in stable condition.        DISCUSSION:  A Qutenza patch was applied today with the purpose of treating the patients nerve pain. They were informed that they may  have some burning of the skin for a few days, and should not take a hot shower for 2-3 days as that may irritate the area.  They were informed that the area may feel like they had a sunburn. They were informed that it can take about 2-4 weeks for the effects of the patch to be fully realized     Plan to repeat every 91 days.

## 2024-05-13 ENCOUNTER — PATIENT MESSAGE (OUTPATIENT)
Dept: PAIN MEDICINE | Facility: CLINIC | Age: 79
End: 2024-05-13
Payer: MEDICARE

## 2024-05-13 ENCOUNTER — TELEPHONE (OUTPATIENT)
Dept: PAIN MEDICINE | Facility: CLINIC | Age: 79
End: 2024-05-13
Payer: MEDICARE

## 2024-05-13 DIAGNOSIS — E08.42 DIABETIC POLYNEUROPATHY ASSOCIATED WITH DIABETES MELLITUS DUE TO UNDERLYING CONDITION: ICD-10-CM

## 2024-05-13 DIAGNOSIS — G89.4 CHRONIC PAIN SYNDROME: ICD-10-CM

## 2024-05-13 DIAGNOSIS — M17.12 PRIMARY OSTEOARTHRITIS OF LEFT KNEE: Primary | ICD-10-CM

## 2024-05-13 DIAGNOSIS — G89.4 CHRONIC PAIN DISORDER: ICD-10-CM

## 2024-05-13 NOTE — TELEPHONE ENCOUNTER
Patient requesting refill on norco  Last office visit 5/10/2024   shows last refill on 04/11/2024  Patient does have a pain contract on file with [facility]  Patient last UDS 12/14/2023 was consistent with current therapy     CODEINE   Not Detected   Not Detected       Not Detected   Comment: INTERPRETIVE INFORMATION: Codeine, U  Positive Cutoff: 40 ng/mL  Methodology: Mass Spectrometry   MORPHINE   Not Detected   Not Detected       Not Detected   Comment: INTERPRETIVE INFORMATION:Morphine, U  Positive Cutoff: 20 ng/mL  Methodology: Mass Spectrometry   6-ACETYLMORPHINE   Not Detected   Not Detected       Not Detected   Comment: INTERPRETIVE INFORMATION:6-acetylmorphine, U  Positive Cutoff: 20 ng/mL  Methodology: Mass Spectrometry   OXYCODONE   Not Detected   Not Detected       Not Detected   Comment: INTERPRETIVE INFORMATION:Oxycodone, U  Positive Cutoff: 40 ng/mL  Methodology: Mass Spectrometry   NOROYXCODONE   Not Detected   Not Detected       Not Detected   Comment: INTERPRETIVE INFORMATION:Noroxycodone, U  Positive Cutoff: 100 ng/mL  Methodology: Mass Spectrometry   OXYMORPHONE   Not Detected   Not Detected       Not Detected   Comment: INTERPRETIVE INFORMATION:Oxymorphone, U  Positive Cutoff: 40 ng/mL  Methodology: Mass Spectrometry   NOROXYMORPHONE   Not Detected   Not Detected       Not Detected   Comment: INTERPRETIVE INFORMATION:Noroxymorphone, U  Positive Cutoff: 100 ng/mL  Methodology: Mass Spectrometry   HYDROCODONE   Present   Present       Present   Comment: INTERPRETIVE INFORMATION:Hydrocodone, U  Positive Cutoff: 40 ng/mL  Methodology: Mass Spectrometry   NORHYDROCODONE   Present   Present       Present   Comment: INTERPRETIVE INFORMATION:Norhydrocodone, U  Positive Cutoff: 100 ng/mL  Methodology: Mass Spectrometry   HYDROMORPHONE   Present   Present       Present   Comment: INTERPRETIVE INFORMATION:Hydromorphone, U  Positive Cutoff: 20 ng/mL  Methodology: Mass  Spectrometry   BUPRENORPHINE   Not Detected   Not Detected       Not Detected   Comment: INTERPRETIVE INFORMATION:Buprenorphine, U  Positive Cutoff: 5 ng/mL  Methodology: Mass Spectrometry   NORUBPRENORPHINE   Not Detected   Not Detected       Not Detected   Comment: INTERPRETIVE INFORMATION:Norbuprenorphine, U  Positive Cutoff: 20 ng/mL  Methodology: Mass Spectrometry   FENTANYL   Not Detected   Not Detected       Not Detected   Comment: INTERPRETIVE INFORMATION:Fentanyl, U  Positive Cutoff: 2 ng/mL  Methodology: Mass Spectrometry   NORFENTANYL   Not Detected   Not Detected       Not Detected   Comment: INTERPRETIVE INFORMATION:Norfentanyl, U  Positive Cutoff: 2 ng/mL  Methodology: Mass Spectrometry   MEPERIDINE METABOLITE   Not Detected   Not Detected       Not Detected   Comment: INTERPRETIVE INFORMATION:Meperidine metabolite, U  Positive Cutoff: 50 ng/mL  Methodology: Mass Spectrometry   TAPENTADOL   Not Detected   Not Detected       Not Detected   Comment: INTERPRETIVE INFORMATION:Tapentadol, U  Positive Cutoff: 100 ng/mL  Methodology: Mass Spectrometry   TAPENTADOL-O-SULF   Not Detected   Not Detected       Not Detected   Comment: INTERPRETIVE INFORMATION:Tapentadol-o-Sulf, U  Positive Cutoff: 200 ng/mL  Methodology: Mass Spectrometry   METHADONE   Negative   Not Detected       Not Detected   Comment: Presumptive negative by immunoassay. Testing by mass  spectrometry is available on request.  INTERPRETIVE INFORMATION: Methadone Screen, U  Positive Cutoff: 150 ng/mL  Methodology: Immunoassay   TRAMADOL   Negative   Not Detected       Not Detected   Comment: Presumptive negative by immunoassay. Testing by mass  spectrometry is available on request.  INTERPRETIVE INFORMATION:Tramadol Screen, U  Positive Cutoff: 100 ng/mL  Methodology: Immunoassay   AMPHETAMINE   Not Detected   Not Detected       Not Detected   Comment: INTERPRETIVE INFORMATION:Amphetamine, U  Positive Cutoff: 50 ng/mL  Methodology: Mass  Spectrometry   METHAMPHETAMINE   Not Detected   Not Detected       Not Detected   Comment: INTERPRETIVE INFORMATION:Methamphetamine, U  Positive Cutoff: 200 ng/mL  Methodology: Mass Spectrometry   MDMA- ECSTASY   Not Detected   Not Detected       Not Detected   Comment: INTERPRETIVE INFORMATION:MDMA, U  Positive Cutoff: 200 ng/mL  Methodology: Mass Spectrometry   MDA   Not Detected   Not Detected       Not Detected   Comment: INTERPRETIVE INFORMATION:MDA, U  Positive Cutoff: 200 ng/mL  Methodology: Mass Spectrometry   MDEA- Mary   Not Detected   Not Detected       Not Detected   Comment: INTERPRETIVE INFORMATION:MDEA, U  Positive Cutoff: 200 ng/mL  Methodology: Mass Spectrometry   METHYLPHENIDATE   Not Detected   Not Detected       Not Detected   Comment: INTERPRETIVE INFORMATION:Methylphenidate, U  Positive Cutoff: 100 ng/mL  Methodology: Mass Spectrometry   PHENTERMINE   Not Detected   Not Detected       Not Detected   Comment: INTERPRETIVE INFORMATION:Phentermine, U  Positive Cutoff: 100 ng/mL  Methodology: Mass Spectrometry   BENZOYLECGONINE   Negative   Not Detected       Not Detected   Comment: Presumptive negative by immunoassay. Testing by mass  spectrometry is available on request.  INTERPRETIVE INFORMATION:Cocaine Screen, U  Positive Cutoff: 150 ng/mL  Methodology: Immunoassay   ALPRAZOLAM   Not Detected   Not Detected       Not Detected   Comment: INTERPRETIVE INFORMATION:Alprazolam, U  Positive Cutoff: 40 ng/mL  Methodology: Mass Spectrometry   ALPHA-OH-ALPRAZOLAM   Not Detected   Not Detected       Not Detected   Comment: INTERPRETIVE INFORMATION:Alpha-OH-Alprazolam, U  Positive Cutoff: 20 ng/mL  Methodology: Mass Spectrometry   CLONAZEPAM   Not Detected   Not Detected       Not Detected   Comment: INTERPRETIVE INFORMATION:Clonazepam, U  Positive Cutoff: 20 ng/mL  Methodology: Mass Spectrometry   7-AMINOCLONAZEPAM   Not Detected   Not Detected       Not Detected   Comment: INTERPRETIVE  INFORMATION:7-Aminoclonazepam, U  Positive Cutoff: 40 ng/mL  Methodology: Mass Spectrometry   DIAZEPAM   Not Detected   Not Detected       Not Detected   Comment: INTERPRETIVE INFORMATION:Diazepam, U  Positive Cutoff: 50 ng/mL  Methodology: Mass Spectrometry   NORDIAZEPAM   Not Detected   Not Detected       Not Detected   Comment: INTERPRETIVE INFORMATION:Nordiazepam, U  Positive Cutoff: 50 ng/mL  Methodology: Mass Spectrometry   OXAZEPAM   Not Detected   Not Detected       Not Detected   Comment: INTERPRETIVE INFORMATION:Oxazepam, U  Positive Cutoff: 50 ng/mL  Methodology: Mass Spectrometry   TEMAZEPAM   Not Detected   Not Detected       Not Detected   Comment: INTERPRETIVE INFORMATION:Temazepam, U  Positive Cutoff: 50 ng/mL  Methodology: Mass Spectrometry   Lorazepam   Not Detected   Not Detected       Not Detected   Comment: INTERPRETIVE INFORMATION:Lorazepam, U  Positive Cutoff: 60 ng/mL  Methodology: Mass Spectrometry   MIDAZOLAM   Not Detected   Not Detected       Not Detected   Comment: INTERPRETIVE INFORMATION:Midazolam, U  Positive Cutoff: 20 ng/mL  Methodology: Mass Spectrometry   ZOLPIDEM   Not Detected   Not Detected       Not Detected   Comment: INTERPRETIVE INFORMATION:Zolpidem, U  Positive Cutoff: 20 ng/mL  Methodology: Mass Spectrometry   BARBITURATES   Negative   Not Detected       Not Detected   Comment: Presumptive negative by immunoassay. Testing by mass  spectrometry is available on request.  INTERPRETIVE INFORMATION:Barbiturates Screen, U  Positive Cutoff: 200 ng/mL  Methodology: Immunoassay   Creatinine, Urine 20.0 - 400.0 mg/dL <20.0 24.0 R 21.1 32.4 R 22.8 R 89.0 R 62.7   Comment: Creatinine <20 mg/dL is consistent with a dilute urine  specimen. Recollection to obtain a more concentrated  specimen, such as a first morning void, may be considered  if unexpected negative urine drug screening results were  obtained.   ETHYL GLUCURONIDE   Negative   Not Detected       Not Detected   Comment:  Presumptive negative by immunoassay. Testing by mass  spectrometry is available on request.  INTERPRETIVE INFORMATION:Ethyl Glucuronide Screen, U  Positive Cutoff: 500 ng/mL  Methodology: Immunoassay   MARIJUANA METABOLITE   Negative   Not Detected CM       Not Detected   Comment: Presumptive negative by immunoassay. Testing by mass  spectrometry is available on request.  INTERPRETIVE INFORMATION: THC (Cannabinoids) Screen, U  Positive Cutoff: 50 ng/mL  Methodology: Immunoassay   PCP   Negative   Not Detected       Not Detected   Comment: Presumptive negative by immunoassay. Testing by mass  spectrometry is available on request.  INTERPRETIVE INFORMATION:Phencyclidine Screen, U  Positive Cutoff: 25 ng/mL  Methodology: Immunoassay   CARISOPRODOL   Negative   Not Detected CM       Not Detected CM   Comment: Presumptive negative by immunoassay. Testing by mass  spectrometry is available on request.  INTERPRETIVE INFORMATION: Carisoprodol Screen, U  Positive Cutoff: 100 ng/mL  Methodology: Immunoassay  The carisoprodol immunoassay has cross-reactivity to  carisoprodol and meprobamate.   Naloxone   Not Detected   Not Detected       Not Detected   Comment: INTERPRETIVE INFORMATION:Naloxone, U  Positive Cutoff: 100 ng/mL  Methodology: Mass Spectrometry   Gabapentin   Not Detected   Not Detected       Not Detected   Comment: INTERPRETIVE INFORMATION:Gabapentin, U  Positive Cutoff: 3,000 ng/mL  Methodology: Mass Spectrometry   Pregabalin   Present   Present       Present   Comment: INTERPRETIVE INFORMATION:Pregabalin, U  Positive Cutoff: 3,000 ng/mL  Methodology: Mass Spectrometry   Alpha-OH-Midazolam   Not Detected   Not Detected       Not Detected   Comment: INTERPRETIVE INFORMATION:Alpha-OH-Midazolam, U  Positive Cutoff: 20 ng/mL  Methodology: Mass Spectrometry   Zolpidem Metabolite   Not Detected   Not Detected       Not Detected   Comment: INTERPRETIVE INFORMATION:Zolpidem Metabolite, U  Positive Cutoff: 100  ng/mL  Methodology: Mass Spectrometry   PAIN MANAGEMENT DRUG PANEL   See Below   See Below CM       See Below CM

## 2024-05-13 NOTE — TELEPHONE ENCOUNTER
----- Message from Estela Godinez sent at 5/13/2024  9:56 AM CDT -----  Contact: Larissa  Type:  Patient Call          Who Called: patient         Does the patient know what this is regarding?: Requesting a call back for a refill on pain medication ; please advise           Would the patient rather a call back or a response via [facility]?call           Best Call Back Number: [phone]             Additional Information:   [facility] [place], [place] - [address] [address]  [address] [address]  Phone: [phone] Fax: [phone]

## 2024-05-14 NOTE — TELEPHONE ENCOUNTER
Patient medication had been pended since May 10th on her last appointment, provider is out of the clinic

## 2024-05-15 ENCOUNTER — OFFICE VISIT (OUTPATIENT)
Dept: CARDIOLOGY | Facility: CLINIC | Age: 79
End: 2024-05-15
Payer: MEDICARE

## 2024-05-15 VITALS
HEART RATE: 78 BPM | SYSTOLIC BLOOD PRESSURE: 118 MMHG | HEIGHT: 60 IN | WEIGHT: 198.19 LBS | OXYGEN SATURATION: 92 % | BODY MASS INDEX: 38.91 KG/M2 | DIASTOLIC BLOOD PRESSURE: 68 MMHG

## 2024-05-15 DIAGNOSIS — I10 PRIMARY HYPERTENSION: ICD-10-CM

## 2024-05-15 DIAGNOSIS — E78.2 MIXED HYPERLIPIDEMIA: ICD-10-CM

## 2024-05-15 DIAGNOSIS — I25.118 ATHEROSCLEROSIS OF NATIVE CORONARY ARTERY OF NATIVE HEART WITH STABLE ANGINA PECTORIS: Primary | ICD-10-CM

## 2024-05-15 DIAGNOSIS — E66.01 SEVERE OBESITY: ICD-10-CM

## 2024-05-15 DIAGNOSIS — I70.0 AORTIC ATHEROSCLEROSIS: ICD-10-CM

## 2024-05-15 PROCEDURE — 99215 OFFICE O/P EST HI 40 MIN: CPT | Mod: S$PBB,,, | Performed by: INTERNAL MEDICINE

## 2024-05-15 PROCEDURE — 99215 OFFICE O/P EST HI 40 MIN: CPT | Mod: PBBFAC | Performed by: INTERNAL MEDICINE

## 2024-05-15 PROCEDURE — 99999 PR PBB SHADOW E&M-EST. PATIENT-LVL V: CPT | Mod: PBBFAC,,, | Performed by: INTERNAL MEDICINE

## 2024-05-15 RX ORDER — HYDROCODONE BITARTRATE AND ACETAMINOPHEN 10; 325 MG/1; MG/1
1 TABLET ORAL EVERY 8 HOURS PRN
Qty: 90 TABLET | Refills: 0 | Status: SHIPPED | OUTPATIENT
Start: 2024-05-15 | End: 2024-06-11 | Stop reason: SDUPTHER

## 2024-05-15 RX ORDER — FLUTICASONE PROPIONATE 50 MCG
SPRAY, SUSPENSION (ML) NASAL
COMMUNITY

## 2024-05-15 NOTE — PROGRESS NOTES
[facility]    Chief Complaint  Chief Complaint   Patient presents with    Coronary Artery Disease       HPI:    She has been doing well.  No complaints.  Takes nitroglycerin for occasional exertional dyspnea and chest tightness.  May need it less than once per week.  Always quickly resolved after 1st dose.    Medications  Current Outpatient Medications   Medication Sig Dispense Refill    allopurinoL (ZYLOPRIM) 300 MG tablet TAKE ONE TABLET BY MOUTH EVERY DAY 90 tablet 1    aspirin (ECOTRIN) 81 MG EC tablet Take 1 tablet (81 mg total) by mouth once daily. 30 tablet 0    blood sugar diagnostic Strp To check BG 6 times daily, to use with insurance preferred meter 200 each 11    blood-glucose meter kit To check BG 6 times daily, to use with insurance preferred meter 1 each 0    blood-glucose meter,continuous (DEXCOM G7 ) Misc Use as directed. 1 each 0    blood-glucose sensor (DEXCOM G7 SENSOR) Nicole Change every 10 days. 3 each 11    cloNIDine (CATAPRES) 0.1 MG tablet Take 1 tablet (0.1 mg total) by mouth 2 (two) times daily. 180 tablet 3    dicyclomine (BENTYL) 10 MG capsule Take 10 mg by mouth 3 (three) times daily.      evolocumab (REPATHA SURECLICK) 140 mg/mL PnIj Inject 1 mL (140 mg total) into the skin every 14 (fourteen) days. 2 each 11    fenofibrate micronized (LOFIBRA) 134 MG Cap Take 1 capsule (134 mg total) by mouth daily with breakfast. 90 capsule 3    fluticasone propionate (FLONASE) 50 mcg/actuation nasal spray Inhale 1 spray every day by intranasal route as needed.      furosemide (LASIX) 40 MG tablet TAKE ONE TABLET BY MOUTH EVERY DAY 90 tablet 1    HYDROcodone-acetaminophen (NORCO)  mg per tablet Take 1 tablet by mouth every 8 (eight) hours as needed for Pain. 90 tablet 0    hydrocortisone (ANUSOL-HC) 2.5 % rectal cream Procto-Med HC 2.5 % topical cream perineal applicator      insulin aspart U-100 (NOVOLOG FLEXPEN U-100 INSULIN) 100 unit/mL (3 mL) InPn pen Inject 14 Units  "into the skin 3 (three) times daily with meals. + meal correction scale. Max 40 units 30 mL 3    insulin detemir U-100, Levemir, (LEVEMIR FLEXTOUCH U100 INSULIN) 100 unit/mL (3 mL) InPn pen Inject 20 Units into the skin once daily AND 18 Units every evening. 30 mL 3    ipratropium-albuteroL (COMBIVENT)  mcg/actuation inhaler Inhale 1 puff into the lungs by mouth every 6 (six) hours. 4 g 0    lancets Misc To check BG 6 times daily, to use with insurance preferred meter 200 each 11    losartan (COZAAR) 50 MG tablet Take 1 tablet (50 mg total) by mouth once daily. 90 tablet 3    methocarbamoL (ROBAXIN) 500 MG Tab TAKE ONE TABLET BY MOUTH 3 TIMES DAILY AS NEEDED FOR MUSCLE SPASMS 90 tablet 2    metoclopramide HCl (REGLAN) 5 MG tablet TAKE ONE TABLET BY MOUTH FOUR TIMES DAILY AS NEEDED FOR NAUSEA PREVENTION 30 tablet 3    metoprolol succinate (TOPROL-XL) 100 MG 24 hr tablet Take 1 tablet (100 mg total) by mouth once daily. 90 tablet 3    mupirocin (BACTROBAN) 2 % ointment Apply topically 3 (three) times daily as needed (skin breakdown). 22 g 3    NIFEdipine (PROCARDIA-XL) 30 MG (OSM) 24 hr tablet Take 1 tablet (30 mg total) by mouth 2 (two) times a day. 180 tablet 3    nitroGLYCERIN (NITROSTAT) 0.4 MG SL tablet Place 1 tablet (0.4 mg total) under the tongue every 5 (five) minutes as needed for Chest pain. 25 tablet 11    pen needle, diabetic 31 gauge x 1/4" Ndle 1 each by Misc.(Non-Drug; Combo Route) route 5 (five) times daily. 500 each 3    pioglitazone (ACTOS) 15 MG tablet Take 15 mg by mouth once daily.      pregabalin (LYRICA) 150 MG capsule Take 1 capsule (150 mg total) by mouth 4 (four) times daily. TAKE ONE CAPSULE BY MOUTH 3 TIMES DAILY 120 capsule 2    senna-docusate 8.6-50 mg (PERICOLACE) 8.6-50 mg per tablet Take 1 tablet by mouth 2 (two) times daily as needed for Constipation. 60 tablet 0    sertraline (ZOLOFT) 100 MG tablet Take 1 tablet (100 mg total) by mouth once daily. 90 tablet 3    teriparatide " 20 mcg/dose (620mcg/2.48mL) PnIj Inject 20 mcg into the skin once daily. 2.48 mL 5    triamcinolone acetonide 0.1% (KENALOG) 0.1 % cream Apply to affected areas of body twice daily as needed rash. Do not use on face, underarms, or groin. 454 g 0    acetaminophen (TYLENOL) 500 MG tablet Take 2 tablets (1,000 mg total) by mouth every 8 (eight) hours as needed for Pain. (Patient not taking: Reported on 5/15/2024)  0    atorvastatin (LIPITOR) 40 MG tablet Take 40 mg by mouth every evening. (Patient not taking: Reported on 5/15/2024)      pantoprazole (PROTONIX) 40 MG tablet Take 1 tablet (40 mg total) by mouth once daily. (Patient not taking: Reported on 5/15/2024) 30 tablet 0     Current Facility-Administered Medications   Medication Dose Route Frequency Provider Last Rate Last Admin    capsaicin-skin cleanser patch 1 patch  1 patch Topical (Top) 1 time in Clinic/HOD Elvira Hylton NP            History  Past Medical History:   Diagnosis Date    Arthritis     Back pain     Cancer     ovarian    Cervical cancer     Coronary artery disease     Depression     Diabetes mellitus     Fibromyalgia     Heart attack     History of MI (myocardial infarction)     Hyperlipidemia     Hypertension     Lupus     Stroke     slight left sided weakness     Past Surgical History:   Procedure Laterality Date    APPENDECTOMY       SECTION      2    CORONARY ANGIOGRAPHY N/A 2022    Procedure: ANGIOGRAM, CORONARY ARTERY;  Surgeon: Jose L Méndez MD;  Location: [facility] CATH LAB;  Service: Cardiology;  Laterality: N/A;    HYSTERECTOMY      with USO for cervical cancer    INJECTION OF ANESTHETIC AGENT AROUND NERVE Bilateral 2021    Procedure: BLOCK, NERVE, SYMPATHIC;  Surgeon: Holden Pereira MD;  Location: [facility] PAIN MGT;  Service: Pain Management;  Laterality: Bilateral;    INJECTION OF ANESTHETIC AGENT AROUND NERVE N/A 2021    Procedure: BLOCK, NERVE, SYMPATHETIC  need consent;  Surgeon: Holden Pereira MD;   Location: [facility] PAIN T;  Service: Pain Management;  Laterality: N/A;    YARED LINK,SWALLOW FUNCTION,CINE/VIDEO RECORD  09/09/2021         TONSILLECTOMY      TRIAL OF SPINAL CORD NERVE STIMULATOR N/A 3/8/2023    Procedure: LUMBAR SPINAL CORD STIMULATOR TRIAL NEVRO REP PATIENT STATES SHE NO LONGER TAKES PLAVIX;  Surgeon: Holden Pereira MD;  Location: [facility];  Service: Pain Management;  Laterality: N/A;     Social History     Socioeconomic History    Marital status:    Tobacco Use    Smoking status: Former     Current packs/day: 0.00     Types: Cigarettes     Quit date: 11/2020     Years since quitting: 3.5     Passive exposure: Past    Smokeless tobacco: Never   Substance and Sexual Activity    Alcohol use: Not Currently     Comment: occasionally    Drug use: Yes     Types: Hydrocodone     Comment: three times a day    Sexual activity: Not Currently   Social History Narrative    Lives with daughter. .      Social Determinants of Health     Financial Resource Strain: Low Risk  (2/27/2024)    Overall Financial Resource Strain (CARDIA)     Difficulty of Paying Living Expenses: Not hard at all   Food Insecurity: Unknown (2/27/2024)    Hunger Vital Sign     Worried About Running Out of Food in the Last Year: Patient declined     Ran Out of Food in the Last Year: Never true   Transportation Needs: No Transportation Needs (2/27/2024)    PRAPARE - Transportation     Lack of Transportation (Medical): No     Lack of Transportation (Non-Medical): No   Physical Activity: Insufficiently Active (2/27/2024)    Exercise Vital Sign     Days of Exercise per Week: 2 days     Minutes of Exercise per Session: 10 min   Stress: No Stress Concern Present (2/27/2024)    [employer] - Occupational Stress Questionnaire     Feeling of Stress : Not at all   Housing Stability: Unknown (2/27/2024)    Housing Stability Vital Sign     Unable to Pay for Housing in the Last Year: No     Unstable  Housing in the Last Year: No     Family History   Problem Relation Name Age of Onset    COPD Mother      Lupus Mother      Hernia Mother      Uterine cancer Mother          vs cervical cancer    Ovarian cancer Mother      Diabetes Father      Coronary artery disease Father      Colon cancer Maternal Grandmother          in her 50's        Allergies  Review of patient's allergies indicates:   Allergen Reactions    Bleach (sodium hypochlorite) Shortness Of Breath    Nitrofurantoin macrocrystalline Anaphylaxis    Lipitor [atorvastatin] Diarrhea and Rash    Nsaids (non-steroidal anti-inflammatory drug)      Tolerates aspirin      Pcn [penicillins]     Toradol [ketorolac]        Review of Systems   Review of Systems   Constitutional: Negative for chills, fever and malaise/fatigue.   HENT:  Negative for nosebleeds.    Eyes:  Negative for blurred vision, double vision, vision loss in left eye and vision loss in right eye.   Cardiovascular:  Positive for chest pain and dyspnea on exertion. Negative for claudication, irregular heartbeat, leg swelling, near-syncope, orthopnea, palpitations, paroxysmal nocturnal dyspnea and syncope.   Respiratory:  Negative for cough, hemoptysis, shortness of breath and wheezing.    Endocrine: Negative for cold intolerance and heat intolerance.   Hematologic/Lymphatic: Negative for bleeding problem. Does not bruise/bleed easily.   Skin:  Negative for color change.   Musculoskeletal:  Negative for falls, muscle weakness and myalgias.   Gastrointestinal:  Negative for abdominal pain, heartburn, hematemesis, hematochezia, hemorrhoids, jaundice, melena, nausea and vomiting.   Genitourinary:  Negative for dysuria, hematuria and nocturia.   Neurological:  Negative for dizziness, focal weakness, headaches, light-headedness, loss of balance, numbness, vertigo and weakness.   Psychiatric/Behavioral:  Negative for altered mental status, depression and memory loss. The patient is not nervous/anxious.     Allergic/Immunologic: Negative for hives and persistent infections.       Physical Exam  Vitals:    05/15/24 1555   BP: 118/68   Pulse: 78     Wt Readings from Last 1 Encounters:   05/15/24 89.9 kg (198 lb 3.2 oz)     Physical Exam  Vitals and nursing note reviewed.   Constitutional:       General: She is not in acute distress.     Appearance: She is obese. She is not toxic-appearing or diaphoretic.   HENT:      Head: Normocephalic and atraumatic.      Mouth/Throat:      Lips: Pink.      Mouth: Mucous membranes are moist.   Eyes:      General: No scleral icterus.     Conjunctiva/sclera: Conjunctivae normal.   Neck:      Thyroid: No thyromegaly.      Vascular: No carotid bruit, hepatojugular reflux or JVD.      Trachea: Trachea normal.   Cardiovascular:      Rate and Rhythm: Normal rate and regular rhythm. No extrasystoles are present.     Chest Wall: PMI is not displaced.      Pulses:           Carotid pulses are 2+ on the right side and 2+ on the left side.       Radial pulses are 2+ on the right side and 2+ on the left side.      Heart sounds: S1 normal and S2 normal. No murmur heard.     No friction rub. No S3 or S4 sounds.   Pulmonary:      Effort: Pulmonary effort is normal. No accessory muscle usage or respiratory distress.      Breath sounds: Normal breath sounds and air entry. No decreased breath sounds, wheezing, rhonchi or rales.   Abdominal:      General: Bowel sounds are normal. There is no distension or abdominal bruit.      Palpations: Abdomen is soft. There is no hepatomegaly, splenomegaly or pulsatile mass.      Tenderness: There is no abdominal tenderness.   Musculoskeletal:         General: No tenderness or deformity.      Right lower leg: No edema.      Left lower leg: No edema.   Skin:     General: Skin is warm and dry.      Capillary Refill: Capillary refill takes less than 2 seconds.      Coloration: Skin is not cyanotic or pale.      Nails: There is no clubbing.   Neurological:      General:  No focal deficit present.      Mental Status: She is alert and oriented to person, place, and time.   Psychiatric:         Attention and Perception: Attention normal.         Mood and Affect: Mood normal.         Speech: Speech normal.         Behavior: Behavior normal. Behavior is cooperative.         Labs  Lab Visit on 04/03/2024   Component Date Value Ref Range Status    Glucose 04/03/2024 267 (H)  70 - 110 mg/dL Final    Sodium 04/03/2024 133 (L)  136 - 145 mmol/L Final    Potassium 04/03/2024 4.9  3.5 - 5.1 mmol/L Final    Specimen slightly hemolyzed    Chloride 04/03/2024 98  95 - 110 mmol/L Final    CO2 04/03/2024 21 (L)  23 - 29 mmol/L Final    BUN 04/03/2024 26 (H)  8 - 23 mg/dL Final    Calcium 04/03/2024 9.1  8.7 - 10.5 mg/dL Final    Creatinine 04/03/2024 1.3  0.5 - 1.4 mg/dL Final    Albumin 04/03/2024 3.5  3.5 - 5.2 g/dL Final    Phosphorus 04/03/2024 3.4  2.7 - 4.5 mg/dL Final    eGFR 04/03/2024 42 (A)  >60 mL/min/1.73 m^2 Final    Anion Gap 04/03/2024 14  8 - 16 mmol/L Final   Lab Visit on 04/03/2024   Component Date Value Ref Range Status    Specimen UA 04/03/2024 Urine, Clean Catch   Final    Color, UA 04/03/2024 Colorless (A)  Yellow, Straw, Aye Final    Appearance, UA 04/03/2024 Clear  Clear Final    pH, UA 04/03/2024 6.0  5.0 - 8.0 Final    Specific Gravity, UA 04/03/2024 1.010  1.005 - 1.030 Final    Protein, UA 04/03/2024 Negative  Negative Final    Comment: Recommend a 24 hour urine protein or a urine   protein/creatinine ratio if globulin induced proteinuria is  clinically suspected.      Glucose, UA 04/03/2024 1+ (A)  Negative Final    Ketones, UA 04/03/2024 Negative  Negative Final    Bilirubin (UA) 04/03/2024 Negative  Negative Final    Occult Blood UA 04/03/2024 Negative  Negative Final    Nitrite, UA 04/03/2024 Negative  Negative Final    Urobilinogen, UA 04/03/2024 Negative  <2.0 EU/dL Final    Leukocytes, UA 04/03/2024 3+ (A)  Negative Final    Protein, Urine Random 04/03/2024 <7  0  - 15 mg/dL Final    Creatinine, Urine 04/03/2024 37.4  15.0 - 325.0 mg/dL Final    Prot/Creat Ratio, Urine 04/03/2024 Unable to calculate  0.00 - 0.20 Final    RBC, UA 04/03/2024 1  0 - 4 /hpf Final    WBC, UA 04/03/2024 13 (H)  0 - 5 /hpf Final    Bacteria 04/03/2024 Occasional  None-Occ /hpf Final    Squam Epithel, UA 04/03/2024 1  /hpf Final    Microscopic Comment 04/03/2024 SEE COMMENT   Final    Comment: Other formed elements not mentioned in the report are not   present in the microscopic examination.      Lab Visit on 03/14/2024   Component Date Value Ref Range Status    Hemoglobin A1C 03/14/2024 9.7 (H)  4.0 - 5.6 % Final    Comment: ADA Screening Guidelines:  5.7-6.4%  Consistent with prediabetes  >or=6.5%  Consistent with diabetes    High levels of fetal hemoglobin interfere with the HbA1C  assay. Heterozygous hemoglobin variants (HbS, HgC, etc)do  not significantly interfere with this assay.   However, presence of multiple variants may affect accuracy.      Estimated Avg Glucose 03/14/2024 232 (H)  68 - 131 mg/dL Final    Sodium 03/14/2024 132 (L)  136 - 145 mmol/L Final    Potassium 03/14/2024 4.5  3.5 - 5.1 mmol/L Final    Chloride 03/14/2024 98  95 - 110 mmol/L Final    CO2 03/14/2024 22 (L)  23 - 29 mmol/L Final    Glucose 03/14/2024 208 (H)  70 - 110 mg/dL Final    BUN 03/14/2024 14  8 - 23 mg/dL Final    Creatinine 03/14/2024 1.2  0.5 - 1.4 mg/dL Final    Calcium 03/14/2024 9.3  8.7 - 10.5 mg/dL Final    Total Protein 03/14/2024 6.7  6.0 - 8.4 g/dL Final    Albumin 03/14/2024 3.7  3.5 - 5.2 g/dL Final    Total Bilirubin 03/14/2024 0.2  0.1 - 1.0 mg/dL Final    Comment: For infants and newborns, interpretation of results should be based  on gestational age, weight and in agreement with clinical  observations.    Premature Infant recommended reference ranges:  Up to 24 hours.............<8.0 mg/dL  Up to 48 hours............<12.0 mg/dL  3-5 days..................<15.0 mg/dL  6-29  days.................<15.0 mg/dL      Alkaline Phosphatase 03/14/2024 70  55 - 135 U/L Final    AST 03/14/2024 20  10 - 40 U/L Final    ALT 03/14/2024 14  10 - 44 U/L Final    eGFR 03/14/2024 46 (A)  >60 mL/min/1.73 m^2 Final    Anion Gap 03/14/2024 12  8 - 16 mmol/L Final    WBC 03/14/2024 12.72 (H)  3.90 - 12.70 K/uL Final    RBC 03/14/2024 3.91 (L)  4.00 - 5.40 M/uL Final    Hemoglobin 03/14/2024 10.7 (L)  12.0 - 16.0 g/dL Final    Hematocrit 03/14/2024 34.1 (L)  37.0 - 48.5 % Final    MCV 03/14/2024 87  82 - 98 fL Final    MCH 03/14/2024 27.4  27.0 - 31.0 pg Final    MCHC 03/14/2024 31.4 (L)  32.0 - 36.0 g/dL Final    RDW 03/14/2024 15.0 (H)  11.5 - 14.5 % Final    Platelets 03/14/2024 264  150 - 450 K/uL Final    MPV 03/14/2024 11.7  9.2 - 12.9 fL Final   Admission on 01/31/2024, Discharged on 02/01/2024   Component Date Value Ref Range Status    WBC 01/31/2024 12.46  3.90 - 12.70 K/uL Final    RBC 01/31/2024 3.85 (L)  4.00 - 5.40 M/uL Final    Hemoglobin 01/31/2024 10.7 (L)  12.0 - 16.0 g/dL Final    Hematocrit 01/31/2024 33.1 (L)  37.0 - 48.5 % Final    MCV 01/31/2024 86  82 - 98 fL Final    MCH 01/31/2024 27.8  27.0 - 31.0 pg Final    MCHC 01/31/2024 32.3  32.0 - 36.0 g/dL Final    RDW 01/31/2024 15.4 (H)  11.5 - 14.5 % Final    Platelets 01/31/2024 267  150 - 450 K/uL Final    MPV 01/31/2024 11.5  9.2 - 12.9 fL Final    Immature Granulocytes 01/31/2024 0.5  0.0 - 0.5 % Final    Gran # (ANC) 01/31/2024 7.5  1.8 - 7.7 K/uL Final    Immature Grans (Abs) 01/31/2024 0.06 (H)  0.00 - 0.04 K/uL Final    Comment: Mild elevation in immature granulocytes is non specific and   can be seen in a variety of conditions including stress response,   acute inflammation, trauma and pregnancy. Correlation with other   laboratory and clinical findings is essential.      Lymph # 01/31/2024 3.3  1.0 - 4.8 K/uL Final    Mono # 01/31/2024 0.9  0.3 - 1.0 K/uL Final    Eos # 01/31/2024 0.7 (H)  0.0 - 0.5 K/uL Final    Baso #  01/31/2024 0.06  0.00 - 0.20 K/uL Final    nRBC 01/31/2024 0  0 /100 WBC Final    Gran % 01/31/2024 59.9  38.0 - 73.0 % Final    Lymph % 01/31/2024 26.5  18.0 - 48.0 % Final    Mono % 01/31/2024 7.4  4.0 - 15.0 % Final    Eosinophil % 01/31/2024 5.2  0.0 - 8.0 % Final    Basophil % 01/31/2024 0.5  0.0 - 1.9 % Final    Differential Method 01/31/2024 Automated   Final    Sodium 01/31/2024 136  136 - 145 mmol/L Final    Potassium 01/31/2024 4.0  3.5 - 5.1 mmol/L Final    Chloride 01/31/2024 102  95 - 110 mmol/L Final    CO2 01/31/2024 23  23 - 29 mmol/L Final    Glucose 01/31/2024 312 (H)  70 - 110 mg/dL Final    BUN 01/31/2024 10  8 - 23 mg/dL Final    Creatinine 01/31/2024 1.2  0.5 - 1.4 mg/dL Final    Calcium 01/31/2024 9.5  8.7 - 10.5 mg/dL Final    Total Protein 01/31/2024 6.6  6.0 - 8.4 g/dL Final    Albumin 01/31/2024 3.4 (L)  3.5 - 5.2 g/dL Final    Total Bilirubin 01/31/2024 0.1  0.1 - 1.0 mg/dL Final    Comment: For infants and newborns, interpretation of results should be based  on gestational age, weight and in agreement with clinical  observations.    Premature Infant recommended reference ranges:  Up to 24 hours.............<8.0 mg/dL  Up to 48 hours............<12.0 mg/dL  3-5 days..................<15.0 mg/dL  6-29 days.................<15.0 mg/dL      Alkaline Phosphatase 01/31/2024 70  55 - 135 U/L Final    AST 01/31/2024 20  10 - 40 U/L Final    ALT 01/31/2024 16  10 - 44 U/L Final    eGFR 01/31/2024 47 (A)  >60 mL/min/1.73 m^2 Final    Anion Gap 01/31/2024 11  8 - 16 mmol/L Final    Prothrombin Time 01/31/2024 10.6  9.0 - 12.5 sec Final    INR 01/31/2024 1.0  0.8 - 1.2 Final    Comment: Coumadin Therapy:  2.0 - 3.0 for INR for all indicators except mechanical heart valves  and antiphospholipid syndromes which should use 2.5 - 3.5.  LOT^040^PT Inn^[id number]      Group & Rh 01/31/2024 A POS   Final    Indirect Nolvia 01/31/2024 NEG   Final    Specimen Outdate 01/31/2024 02/03/2024 23:59   Final    POCT  Glucose 01/31/2024 284 (H)  70 - 110 mg/dL Final    POC Creatinine 01/31/2024 0.9  0.5 - 1.4 mg/dL Final    Sample 01/31/2024 VENOUS   Final    POCT Glucose 01/31/2024 244 (H)  70 - 110 mg/dL Final    WBC 01/31/2024 11.74  3.90 - 12.70 K/uL Final    RBC 01/31/2024 3.90 (L)  4.00 - 5.40 M/uL Final    Hemoglobin 01/31/2024 10.7 (L)  12.0 - 16.0 g/dL Final    Hematocrit 01/31/2024 33.6 (L)  37.0 - 48.5 % Final    MCV 01/31/2024 86  82 - 98 fL Final    MCH 01/31/2024 27.4  27.0 - 31.0 pg Final    MCHC 01/31/2024 31.8 (L)  32.0 - 36.0 g/dL Final    RDW 01/31/2024 15.5 (H)  11.5 - 14.5 % Final    Platelets 01/31/2024 258  150 - 450 K/uL Final    MPV 01/31/2024 11.5  9.2 - 12.9 fL Final    Immature Granulocytes 01/31/2024 0.4  0.0 - 0.5 % Final    Gran # (ANC) 01/31/2024 7.5  1.8 - 7.7 K/uL Final    Immature Grans (Abs) 01/31/2024 0.05 (H)  0.00 - 0.04 K/uL Final    Comment: Mild elevation in immature granulocytes is non specific and   can be seen in a variety of conditions including stress response,   acute inflammation, trauma and pregnancy. Correlation with other   laboratory and clinical findings is essential.      Lymph # 01/31/2024 2.6  1.0 - 4.8 K/uL Final    Mono # 01/31/2024 0.9  0.3 - 1.0 K/uL Final    Eos # 01/31/2024 0.6 (H)  0.0 - 0.5 K/uL Final    Baso # 01/31/2024 0.05  0.00 - 0.20 K/uL Final    nRBC 01/31/2024 0  0 /100 WBC Final    Gran % 01/31/2024 63.9  38.0 - 73.0 % Final    Lymph % 01/31/2024 22.3  18.0 - 48.0 % Final    Mono % 01/31/2024 7.9  4.0 - 15.0 % Final    Eosinophil % 01/31/2024 5.1  0.0 - 8.0 % Final    Basophil % 01/31/2024 0.4  0.0 - 1.9 % Final    Differential Method 01/31/2024 Automated   Final    POCT Glucose 01/31/2024 290 (H)  70 - 110 mg/dL Final    WBC 02/01/2024 11.53  3.90 - 12.70 K/uL Final    RBC 02/01/2024 3.80 (L)  4.00 - 5.40 M/uL Final    Hemoglobin 02/01/2024 10.4 (L)  12.0 - 16.0 g/dL Final    Hematocrit 02/01/2024 33.0 (L)  37.0 - 48.5 % Final    MCV 02/01/2024 87  82 -  98 fL Final    MCH 02/01/2024 27.4  27.0 - 31.0 pg Final    MCHC 02/01/2024 31.5 (L)  32.0 - 36.0 g/dL Final    RDW 02/01/2024 15.2 (H)  11.5 - 14.5 % Final    Platelets 02/01/2024 250  150 - 450 K/uL Final    MPV 02/01/2024 11.5  9.2 - 12.9 fL Final    Immature Granulocytes 02/01/2024 0.4  0.0 - 0.5 % Final    Gran # (ANC) 02/01/2024 7.6  1.8 - 7.7 K/uL Final    Immature Grans (Abs) 02/01/2024 0.05 (H)  0.00 - 0.04 K/uL Final    Comment: Mild elevation in immature granulocytes is non specific and   can be seen in a variety of conditions including stress response,   acute inflammation, trauma and pregnancy. Correlation with other   laboratory and clinical findings is essential.      Lymph # 02/01/2024 2.2  1.0 - 4.8 K/uL Final    Mono # 02/01/2024 1.0  0.3 - 1.0 K/uL Final    Eos # 02/01/2024 0.6 (H)  0.0 - 0.5 K/uL Final    Baso # 02/01/2024 0.06  0.00 - 0.20 K/uL Final    nRBC 02/01/2024 0  0 /100 WBC Final    Gran % 02/01/2024 66.3  38.0 - 73.0 % Final    Lymph % 02/01/2024 19.4  18.0 - 48.0 % Final    Mono % 02/01/2024 8.3  4.0 - 15.0 % Final    Eosinophil % 02/01/2024 5.1  0.0 - 8.0 % Final    Basophil % 02/01/2024 0.5  0.0 - 1.9 % Final    Differential Method 02/01/2024 Automated   Final    WBC 02/01/2024 11.22  3.90 - 12.70 K/uL Final    RBC 02/01/2024 3.89 (L)  4.00 - 5.40 M/uL Final    Hemoglobin 02/01/2024 10.8 (L)  12.0 - 16.0 g/dL Final    Hematocrit 02/01/2024 33.8 (L)  37.0 - 48.5 % Final    MCV 02/01/2024 87  82 - 98 fL Final    MCH 02/01/2024 27.8  27.0 - 31.0 pg Final    MCHC 02/01/2024 32.0  32.0 - 36.0 g/dL Final    RDW 02/01/2024 15.4 (H)  11.5 - 14.5 % Final    Platelets 02/01/2024 233  150 - 450 K/uL Final    Platelets are clumped on smear.Platelet count may be affected.    MPV 02/01/2024 12.1  9.2 - 12.9 fL Final    Immature Granulocytes 02/01/2024 0.6 (H)  0.0 - 0.5 % Final    Gran # (ANC) 02/01/2024 6.6  1.8 - 7.7 K/uL Final    Immature Grans (Abs) 02/01/2024 0.07 (H)  0.00 - 0.04 K/uL Final     Comment: Mild elevation in immature granulocytes is non specific and   can be seen in a variety of conditions including stress response,   acute inflammation, trauma and pregnancy. Correlation with other   laboratory and clinical findings is essential.      Lymph # 02/01/2024 3.1  1.0 - 4.8 K/uL Final    Mono # 02/01/2024 0.8  0.3 - 1.0 K/uL Final    Eos # 02/01/2024 0.6 (H)  0.0 - 0.5 K/uL Final    Baso # 02/01/2024 0.07  0.00 - 0.20 K/uL Final    nRBC 02/01/2024 0  0 /100 WBC Final    Gran % 02/01/2024 59.0  38.0 - 73.0 % Final    Lymph % 02/01/2024 27.3  18.0 - 48.0 % Final    Mono % 02/01/2024 7.3  4.0 - 15.0 % Final    Eosinophil % 02/01/2024 5.2  0.0 - 8.0 % Final    Basophil % 02/01/2024 0.6  0.0 - 1.9 % Final    Platelet Estimate 02/01/2024 Appears normal   Final    Differential Method 02/01/2024 Automated   Final    POCT Glucose 02/01/2024 373 (H)  70 - 110 mg/dL Final    POCT Glucose 02/01/2024 431 (H)  70 - 110 mg/dL Final   Admission on 12/15/2023, Discharged on 12/16/2023   Component Date Value Ref Range Status    HIV 1/2 Ag/Ab 12/15/2023 Negative  Negative Final    Hepatitis C Ab 12/15/2023 Negative  Negative Final    WBC 12/15/2023 11.82  3.90 - 12.70 K/uL Final    RBC 12/15/2023 4.01  4.00 - 5.40 M/uL Final    Hemoglobin 12/15/2023 11.1 (L)  12.0 - 16.0 g/dL Final    Hematocrit 12/15/2023 34.3 (L)  37.0 - 48.5 % Final    MCV 12/15/2023 86  82 - 98 fL Final    MCH 12/15/2023 27.7  27.0 - 31.0 pg Final    MCHC 12/15/2023 32.4  32.0 - 36.0 g/dL Final    RDW 12/15/2023 15.6 (H)  11.5 - 14.5 % Final    Platelets 12/15/2023 306  150 - 450 K/uL Final    MPV 12/15/2023 11.2  9.2 - 12.9 fL Final    Immature Granulocytes 12/15/2023 0.9 (H)  0.0 - 0.5 % Final    Gran # (ANC) 12/15/2023 6.8  1.8 - 7.7 K/uL Final    Immature Grans (Abs) 12/15/2023 0.11 (H)  0.00 - 0.04 K/uL Final    Comment: Mild elevation in immature granulocytes is non specific and   can be seen in a variety of conditions including stress  response,   acute inflammation, trauma and pregnancy. Correlation with other   laboratory and clinical findings is essential.      Lymph # 12/15/2023 3.1  1.0 - 4.8 K/uL Final    Mono # 12/15/2023 1.0  0.3 - 1.0 K/uL Final    Eos # 12/15/2023 0.8 (H)  0.0 - 0.5 K/uL Final    Baso # 12/15/2023 0.08  0.00 - 0.20 K/uL Final    nRBC 12/15/2023 0  0 /100 WBC Final    Gran % 12/15/2023 57.3  38.0 - 73.0 % Final    Lymph % 12/15/2023 26.0  18.0 - 48.0 % Final    Mono % 12/15/2023 8.5  4.0 - 15.0 % Final    Eosinophil % 12/15/2023 6.6  0.0 - 8.0 % Final    Basophil % 12/15/2023 0.7  0.0 - 1.9 % Final    Differential Method 12/15/2023 Automated   Final    Sodium 12/15/2023 136  136 - 145 mmol/L Final    Potassium 12/15/2023 4.7  3.5 - 5.1 mmol/L Final    Specimen moderately hemolyzed    Chloride 12/15/2023 99  95 - 110 mmol/L Final    CO2 12/15/2023 22 (L)  23 - 29 mmol/L Final    Glucose 12/15/2023 193 (H)  70 - 110 mg/dL Final    BUN 12/15/2023 19  8 - 23 mg/dL Final    Creatinine 12/15/2023 1.2  0.5 - 1.4 mg/dL Final    Calcium 12/15/2023 9.2  8.7 - 10.5 mg/dL Final    Total Protein 12/15/2023 7.5  6.0 - 8.4 g/dL Final    Albumin 12/15/2023 3.6  3.5 - 5.2 g/dL Final    Total Bilirubin 12/15/2023 0.2  0.1 - 1.0 mg/dL Final    Comment: For infants and newborns, interpretation of results should be based  on gestational age, weight and in agreement with clinical  observations.    Premature Infant recommended reference ranges:  Up to 24 hours.............<8.0 mg/dL  Up to 48 hours............<12.0 mg/dL  3-5 days..................<15.0 mg/dL  6-29 days.................<15.0 mg/dL      Alkaline Phosphatase 12/15/2023 65  55 - 135 U/L Final    AST 12/15/2023 28  10 - 40 U/L Final    ALT 12/15/2023 15  10 - 44 U/L Final    eGFR 12/15/2023 47 (A)  >60 mL/min/1.73 m^2 Final    Anion Gap 12/15/2023 15  8 - 16 mmol/L Final    Troponin I 12/15/2023 0.019  0.000 - 0.026 ng/mL Final    Comment: The reference interval for Troponin I  represents the 99th percentile   cutoff   for our facility and is consistent with 3rd generation assay   performance.      POC Glucose 12/15/2023 210 (A)  70 - 110 MG/DL Final    POC Molecular Influenza A Ag 12/15/2023 Negative  Negative, Not Reported Final    POC Molecular Influenza B Ag 12/15/2023 Negative  Negative, Not Reported Final     Acceptable 12/15/2023 Yes   Final    POC Rapid COVID 12/15/2023 Negative  Negative Final     Acceptable 12/15/2023 Yes   Final    CRP 12/15/2023 17.6 (H)  0.0 - 8.2 mg/L Final    Complement (C-3) 12/15/2023 179  50 - 180 mg/dL Final    Complement (C-4) 12/15/2023 41  11 - 44 mg/dL Final    POCT Glucose 12/15/2023 210 (H)  70 - 110 mg/dL Final    BSA 12/16/2023 1.95  m2 Final    LVOT stroke volume 12/16/2023 101.27  cm3 Final    LVIDd 12/16/2023 3.50  3.5 - 6.0 cm Final    LV Systolic Volume 12/16/2023 25.65  mL Final    LV Systolic Volume Index 12/16/2023 13.7  mL/m2 Final    LVIDs 12/16/2023 2.64  2.1 - 4.0 cm Final    LV Diastolic Volume 12/16/2023 50.93  mL Final    LV Diastolic Volume Index 12/16/2023 27.24  mL/m2 Final    IVS 12/16/2023 1.04  0.6 - 1.1 cm Final    LVOT diameter 12/16/2023 2.06  cm Final    LVOT area 12/16/2023 3.3  cm2 Final    FS 12/16/2023 25 (A)  28 - 44 % Final    Left Ventricle Relative Wall Thick* 12/16/2023 0.69  cm Final    Posterior Wall 12/16/2023 1.2 (A)  0.6 - 1.1 cm Final    LV mass 12/16/2023 122.28  g Final    LV Mass Index 12/16/2023 65  g/m2 Final    MV Peak E Calvin 12/16/2023 1.07  m/s Final    TDI LATERAL 12/16/2023 0.07  m/s Final    TDI SEPTAL 12/16/2023 0.05  m/s Final    E/E' ratio 12/16/2023 17.83  m/s Final    MV Peak A Calvin 12/16/2023 1.25  m/s Final    TR Max Calvin 12/16/2023 2.7  m/s Final    E/A ratio 12/16/2023 0.86   Final    IVRT 12/16/2023 87.54  msec Final    E wave deceleration time 12/16/2023 163.20  msec Final    LV SEPTAL E/E' RATIO 12/16/2023 21.40  m/s Final    LV LATERAL E/E' RATIO 12/16/2023  15.29  m/s Final    PV Peak S Calvin 12/16/2023 0.41  m/s Final    PV Peak D Calvin 12/16/2023 0.59  m/s Final    Pulm vein S/D ratio 12/16/2023 0.69   Final    LVOT peak calvin 12/16/2023 1.36  m/s Final    Left Ventricular Outflow Tract Amy* 12/16/2023 0.92  cm/s Final    Left Ventricular Outflow Tract Amy* 12/16/2023 3.95  mmHg Final    RVDD 12/16/2023 3.86  cm Final    RV S' 12/16/2023 10.19  cm/s Final    RVOT peak VTI 12/16/2023 25.0  cm Final    LA size 12/16/2023 3.45  cm Final    Left Atrium Minor Axis 12/16/2023 4.56  cm Final    Left Atrium Major Axis 12/16/2023 5.68  cm Final    LA volume (mod) 12/16/2023 69.00  cm3 Final    LA Volume Index (Mod) 12/16/2023 36.9  mL/m2 Final    RA Major Axis 12/16/2023 4.91  cm Final    AV mean gradient 12/16/2023 8  mmHg Final    AV peak gradient 12/16/2023 13  mmHg Final    Ao peak calvin 12/16/2023 1.82  m/s Final    Ao VTI 12/16/2023 37.60  cm Final    LVOT peak VTI 12/16/2023 30.40  cm Final    AV valve area 12/16/2023 2.69  cm² Final    AV Velocity Ratio 12/16/2023 0.75   Final    AV index (prosthetic) 12/16/2023 0.81   Final    PAT by Velocity Ratio 12/16/2023 2.49  cm² Final    Mr max calvin 12/16/2023 4.16  m/s Final    MV stenosis pressure 1/2 time 12/16/2023 47.33  ms Final    MV valve area p 1/2 method 12/16/2023 4.65  cm2 Final    Triscuspid Valve Regurgitation Pea* 12/16/2023 29  mmHg Final    PV mean gradient 12/16/2023 3  mmHg Final    PV PEAK VELOCITY 12/16/2023 1.18  m/s Final    PV peak gradient 12/16/2023 6  mmHg Final    RVOT peak calvin 12/16/2023 1.20  m/s Final    STJ 12/16/2023 2.96  cm Final    Ascending aorta 12/16/2023 3.39  cm Final    Mean e' 12/16/2023 0.06  m/s Final    ZLVIDS 12/16/2023 -1.56   Final    ZLVIDD 12/16/2023 -3.98   Final    LA Volume Index 12/16/2023 27.0  mL/m2 Final    LA volume 12/16/2023 50.44  cm3 Final    TAPSE 12/16/2023 2.20  cm Final    LA WIDTH 12/16/2023 3.4  cm Final    RA Width 12/16/2023 4.4  cm Final    Sinus 12/16/2023 3.0  cm  Final    Mitral Valve Heart Rate 12/16/2023 79  bpm Final    MV mean gradient 12/16/2023 4  mmHg Final    TV resting pulmonary artery pressu* 12/16/2023 29  mmHg Final    RV TB RVSP 12/16/2023 3  mmHg Final    Est. RA pres 12/16/2023 0  mmHg Final    Troponin I 12/16/2023 0.013  0.000 - 0.026 ng/mL Final    Comment: The reference interval for Troponin I represents the 99th percentile   cutoff   for our facility and is consistent with 3rd generation assay   performance.      Sodium 12/16/2023 133 (L)  136 - 145 mmol/L Final    Potassium 12/16/2023 4.6  3.5 - 5.1 mmol/L Final    Chloride 12/16/2023 99  95 - 110 mmol/L Final    CO2 12/16/2023 23  23 - 29 mmol/L Final    Glucose 12/16/2023 502 (HH)  70 - 110 mg/dL Final    Comment: Glucose critical result(s) called and verbal readback obtained from   River Duron RN by DETZOYA 12/16/2023 05:09      BUN 12/16/2023 20  8 - 23 mg/dL Final    Creatinine 12/16/2023 1.5 (H)  0.5 - 1.4 mg/dL Final    Calcium 12/16/2023 8.7  8.7 - 10.5 mg/dL Final    Total Protein 12/16/2023 6.7  6.0 - 8.4 g/dL Final    Albumin 12/16/2023 3.2 (L)  3.5 - 5.2 g/dL Final    Total Bilirubin 12/16/2023 0.2  0.1 - 1.0 mg/dL Final    Comment: For infants and newborns, interpretation of results should be based  on gestational age, weight and in agreement with clinical  observations.    Premature Infant recommended reference ranges:  Up to 24 hours.............<8.0 mg/dL  Up to 48 hours............<12.0 mg/dL  3-5 days..................<15.0 mg/dL  6-29 days.................<15.0 mg/dL      Alkaline Phosphatase 12/16/2023 60  55 - 135 U/L Final    AST 12/16/2023 18  10 - 40 U/L Final    ALT 12/16/2023 16  10 - 44 U/L Final    eGFR 12/16/2023 36 (A)  >60 mL/min/1.73 m^2 Final    Anion Gap 12/16/2023 11  8 - 16 mmol/L Final    WBC 12/16/2023 10.76  3.90 - 12.70 K/uL Final    RBC 12/16/2023 3.77 (L)  4.00 - 5.40 M/uL Final    Hemoglobin 12/16/2023 10.4 (L)  12.0 - 16.0 g/dL Final    Hematocrit 12/16/2023  32.8 (L)  37.0 - 48.5 % Final    MCV 12/16/2023 87  82 - 98 fL Final    MCH 12/16/2023 27.6  27.0 - 31.0 pg Final    MCHC 12/16/2023 31.7 (L)  32.0 - 36.0 g/dL Final    RDW 12/16/2023 15.5 (H)  11.5 - 14.5 % Final    Platelets 12/16/2023 289  150 - 450 K/uL Final    MPV 12/16/2023 11.9  9.2 - 12.9 fL Final    Immature Granulocytes 12/16/2023 0.7 (H)  0.0 - 0.5 % Final    Gran # (ANC) 12/16/2023 5.8  1.8 - 7.7 K/uL Final    Immature Grans (Abs) 12/16/2023 0.08 (H)  0.00 - 0.04 K/uL Final    Comment: Mild elevation in immature granulocytes is non specific and   can be seen in a variety of conditions including stress response,   acute inflammation, trauma and pregnancy. Correlation with other   laboratory and clinical findings is essential.      Lymph # 12/16/2023 3.3  1.0 - 4.8 K/uL Final    Mono # 12/16/2023 0.9  0.3 - 1.0 K/uL Final    Eos # 12/16/2023 0.7 (H)  0.0 - 0.5 K/uL Final    Baso # 12/16/2023 0.07  0.00 - 0.20 K/uL Final    nRBC 12/16/2023 0  0 /100 WBC Final    Gran % 12/16/2023 54.2  38.0 - 73.0 % Final    Lymph % 12/16/2023 30.2  18.0 - 48.0 % Final    Mono % 12/16/2023 7.9  4.0 - 15.0 % Final    Eosinophil % 12/16/2023 6.3  0.0 - 8.0 % Final    Basophil % 12/16/2023 0.7  0.0 - 1.9 % Final    Differential Method 12/16/2023 Automated   Final    Troponin I 12/16/2023 0.008  0.000 - 0.026 ng/mL Final    Comment: The reference interval for Troponin I represents the 99th percentile   cutoff   for our facility and is consistent with 3rd generation assay   performance.      POCT Glucose 12/16/2023 444 (H)  70 - 110 mg/dL Final    POCT Glucose 12/16/2023 380 (H)  70 - 110 mg/dL Final    POCT Glucose 12/16/2023 287 (H)  70 - 110 mg/dL Final    POCT Glucose 12/16/2023 342 (H)  70 - 110 mg/dL Final   Office Visit on 12/14/2023   Component Date Value Ref Range Status    PAIN MANAGEMENT DRUG PANEL INTERP 12/14/2023 See Note   Final    Comment: ____________________________________________________________  ____  NO  DRUGS PROVIDED  ____________________________________________________________  ____  Please call [facility] [employer] Client Services at   [phone] for Medical Director interpretation if   applicable. Alternatively, please consider the TARGETED   DRUG PROF, MASS SPEC/EMIT, UR ([id number]) which does not   require medication information or provide compliance   interpretation.  ____________________________________________________________  ____  Creatinine <20 mg/dL is consistent with a dilute urine   specimen. Recollection to obtain a more concentrated   specimen, such as a first morning void, may be considered   if unexpected negative urine drug screening results were   obtained.  ____________________________________________________________  ____  INTERPRETIVE INFORMATION: Targeted drug profile Interp  Interpretation depends on accuracy and completeness of   patient medication information submitted by client.      CODEINE 12/14/2023 Not Detected   Final    Comment: INTERPRETIVE INFORMATION: Codeine, U  Positive Cutoff: 40 ng/mL  Methodology: Mass Spectrometry      MORPHINE 12/14/2023 Not Detected   Final    Comment: INTERPRETIVE INFORMATION:Morphine, U   Positive Cutoff: 20 ng/mL   Methodology: Mass Spectrometry      6-ACETYLMORPHINE 12/14/2023 Not Detected   Final    Comment: INTERPRETIVE INFORMATION:6-acetylmorphine, U  Positive Cutoff: 20 ng/mL   Methodology: Mass Spectrometry      OXYCODONE 12/14/2023 Not Detected   Final    Comment: INTERPRETIVE INFORMATION:Oxycodone, U  Positive Cutoff: 40 ng/mL   Methodology: Mass Spectrometry      NOROYXCODONE 12/14/2023 Not Detected   Final    Comment: INTERPRETIVE INFORMATION:Noroxycodone, U  Positive Cutoff: 100 ng/mL   Methodology: Mass Spectrometry      OXYMORPHONE 12/14/2023 Not Detected   Final    Comment: INTERPRETIVE INFORMATION:Oxymorphone, U  Positive Cutoff: 40 ng/mL   Methodology: Mass Spectrometry      NOROXYMORPHONE 12/14/2023 Not Detected   Final    Comment:  INTERPRETIVE INFORMATION:Noroxymorphone, U   Positive Cutoff: 100 ng/mL   Methodology: Mass Spectrometry      HYDROCODONE 12/14/2023 Present   Final    Comment: INTERPRETIVE INFORMATION:Hydrocodone, U  Positive Cutoff: 40 ng/mL   Methodology: Mass Spectrometry      NORHYDROCODONE 12/14/2023 Present   Final    Comment: INTERPRETIVE INFORMATION:Norhydrocodone, U  Positive Cutoff: 100 ng/mL   Methodology: Mass Spectrometry      HYDROMORPHONE 12/14/2023 Present   Final    Comment: INTERPRETIVE INFORMATION:Hydromorphone, U  Positive Cutoff: 20 ng/mL   Methodology: Mass Spectrometry      BUPRENORPHINE 12/14/2023 Not Detected   Final    Comment: INTERPRETIVE INFORMATION:Buprenorphine, U  Positive Cutoff: 5 ng/mL  Methodology: Mass Spectrometry      NORUBPRENORPHINE 12/14/2023 Not Detected   Final    Comment: INTERPRETIVE INFORMATION:Norbuprenorphine, U  Positive Cutoff: 20 ng/mL   Methodology: Mass Spectrometry      FENTANYL 12/14/2023 Not Detected   Final    Comment: INTERPRETIVE INFORMATION:Fentanyl, U  Positive Cutoff: 2 ng/mL  Methodology: Mass Spectrometry      NORFENTANYL 12/14/2023 Not Detected   Final    Comment: INTERPRETIVE INFORMATION:Norfentanyl, U  Positive Cutoff: 2 ng/mL  Methodology: Mass Spectrometry      MEPERIDINE METABOLITE 12/14/2023 Not Detected   Final    Comment: INTERPRETIVE INFORMATION:Meperidine metabolite, U  Positive Cutoff: 50 ng/mL  Methodology: Mass Spectrometry      TAPENTADOL 12/14/2023 Not Detected   Final    Comment: INTERPRETIVE INFORMATION:Tapentadol, U  Positive Cutoff: 100 ng/mL  Methodology: Mass Spectrometry      TAPENTADOL-O-SULF 12/14/2023 Not Detected   Final    Comment: INTERPRETIVE INFORMATION:Tapentadol-o-Sulf, U  Positive Cutoff: 200 ng/mL  Methodology: Mass Spectrometry      METHADONE 12/14/2023 Negative   Final    Comment: Presumptive negative by immunoassay. Testing by mass   spectrometry is available on request.  INTERPRETIVE INFORMATION: Methadone Screen, U  Positive  Cutoff: 150 ng/mL   Methodology: Immunoassay      TRAMADOL 12/14/2023 Negative   Final    Comment: Presumptive negative by immunoassay. Testing by mass   spectrometry is available on request.  INTERPRETIVE INFORMATION:Tramadol Screen, U  Positive Cutoff: 100 ng/mL  Methodology: Immunoassay      AMPHETAMINE 12/14/2023 Not Detected   Final    Comment: INTERPRETIVE INFORMATION:Amphetamine, U  Positive Cutoff: 50 ng/mL  Methodology: Mass Spectrometry      METHAMPHETAMINE 12/14/2023 Not Detected   Final    Comment: INTERPRETIVE INFORMATION:Methamphetamine, U  Positive Cutoff: 200 ng/mL  Methodology: Mass Spectrometry      MDMA- ECSTASY 12/14/2023 Not Detected   Final    Comment: INTERPRETIVE INFORMATION:MDMA, U  Positive Cutoff: 200 ng/mL  Methodology: Mass Spectrometry      MDA 12/14/2023 Not Detected   Final    Comment: INTERPRETIVE INFORMATION:MDA, U  Positive Cutoff: 200 ng/mL  Methodology: Mass Spectrometry      MDEA- Mary 12/14/2023 Not Detected   Final    Comment: INTERPRETIVE INFORMATION:MDEA, U  Positive Cutoff: 200 ng/mL  Methodology: Mass Spectrometry      METHYLPHENIDATE 12/14/2023 Not Detected   Final    Comment: INTERPRETIVE INFORMATION:Methylphenidate, U  Positive Cutoff: 100 ng/mL  Methodology: Mass Spectrometry      PHENTERMINE 12/14/2023 Not Detected   Final    Comment: INTERPRETIVE INFORMATION:Phentermine, U  Positive Cutoff: 100 ng/mL  Methodology: Mass Spectrometry      BENZOYLECGONINE 12/14/2023 Negative   Final    Comment: Presumptive negative by immunoassay. Testing by mass   spectrometry is available on request.  INTERPRETIVE INFORMATION:Cocaine Screen, U  Positive Cutoff: 150 ng/mL  Methodology: Immunoassay      ALPRAZOLAM 12/14/2023 Not Detected   Final    Comment: INTERPRETIVE INFORMATION:Alprazolam, U  Positive Cutoff: 40 ng/mL  Methodology: Mass Spectrometry      ALPHA-OH-ALPRAZOLAM 12/14/2023 Not Detected   Final    Comment: INTERPRETIVE INFORMATION:Alpha-OH-Alprazolam, U  Positive Cutoff:  20 ng/mL  Methodology: Mass Spectrometry      CLONAZEPAM 12/14/2023 Not Detected   Final    Comment: INTERPRETIVE INFORMATION:Clonazepam, U  Positive Cutoff: 20 ng/mL  Methodology: Mass Spectrometry      7-AMINOCLONAZEPAM 12/14/2023 Not Detected   Final    Comment: INTERPRETIVE INFORMATION:7-Aminoclonazepam, U  Positive Cutoff: 40 ng/mL  Methodology: Mass Spectrometry      DIAZEPAM 12/14/2023 Not Detected   Final    Comment: INTERPRETIVE INFORMATION:Diazepam, U  Positive Cutoff: 50 ng/mL  Methodology: Mass Spectrometry      NORDIAZEPAM 12/14/2023 Not Detected   Final    Comment: INTERPRETIVE INFORMATION:Nordiazepam, U  Positive Cutoff: 50 ng/mL  Methodology: Mass Spectrometry      OXAZEPAM 12/14/2023 Not Detected   Final    Comment: INTERPRETIVE INFORMATION:Oxazepam, U  Positive Cutoff: 50 ng/mL  Methodology: Mass Spectrometry      TEMAZEPAM 12/14/2023 Not Detected   Final    Comment: INTERPRETIVE INFORMATION:Temazepam, U  Positive Cutoff: 50 ng/mL  Methodology: Mass Spectrometry      Lorazepam 12/14/2023 Not Detected   Final    Comment: INTERPRETIVE INFORMATION:Lorazepam, U  Positive Cutoff: 60 ng/mL  Methodology: Mass Spectrometry      MIDAZOLAM 12/14/2023 Not Detected   Final    Comment: INTERPRETIVE INFORMATION:Midazolam, U  Positive Cutoff: 20 ng/mL  Methodology: Mass Spectrometry      ZOLPIDEM 12/14/2023 Not Detected   Final    Comment: INTERPRETIVE INFORMATION:Zolpidem, U  Positive Cutoff: 20 ng/mL  Methodology: Mass Spectrometry      BARBITURATES 12/14/2023 Negative   Final    Comment: Presumptive negative by immunoassay. Testing by mass   spectrometry is available on request.  INTERPRETIVE INFORMATION:Barbiturates Screen, U  Positive Cutoff: 200 ng/mL  Methodology: Immunoassay      Creatinine, Urine 12/14/2023 <20.0  20.0 - 400.0 mg/dL Final    Comment: Creatinine <20 mg/dL is consistent with a dilute urine   specimen. Recollection to obtain a more concentrated   specimen, such as a first morning void, may  be considered   if unexpected negative urine drug screening results were   obtained.      ETHYL GLUCURONIDE 12/14/2023 Negative   Final    Comment: Presumptive negative by immunoassay. Testing by mass   spectrometry is available on request.  INTERPRETIVE INFORMATION:Ethyl Glucuronide Screen, U  Positive Cutoff: 500 ng/mL  Methodology: Immunoassay      MARIJUANA METABOLITE 12/14/2023 Negative   Final    Comment: Presumptive negative by immunoassay. Testing by mass   spectrometry is available on request.  INTERPRETIVE INFORMATION: THC (Cannabinoids) Screen, U  Positive Cutoff: 50 ng/mL  Methodology: Immunoassay      PCP 12/14/2023 Negative   Final    Comment: Presumptive negative by immunoassay. Testing by mass   spectrometry is available on request.  INTERPRETIVE INFORMATION:Phencyclidine Screen, U  Positive Cutoff: 25 ng/mL  Methodology: Immunoassay      CARISOPRODOL 12/14/2023 Negative   Final    Comment: Presumptive negative by immunoassay. Testing by mass   spectrometry is available on request.  INTERPRETIVE INFORMATION: Carisoprodol Screen, U  Positive Cutoff: 100 ng/mL  Methodology: Immunoassay  The carisoprodol immunoassay has cross-reactivity to   carisoprodol and meprobamate.      Naloxone 12/14/2023 Not Detected   Final    Comment: INTERPRETIVE INFORMATION:Naloxone, U  Positive Cutoff: 100 ng/mL  Methodology: Mass Spectrometry      Gabapentin 12/14/2023 Not Detected   Final    Comment: INTERPRETIVE INFORMATION:Gabapentin, U  Positive Cutoff: 3,000 ng/mL  Methodology: Mass Spectrometry      Pregabalin 12/14/2023 Present   Final    Comment: INTERPRETIVE INFORMATION:Pregabalin, U  Positive Cutoff: 3,000 ng/mL  Methodology: Mass Spectrometry      Alpha-OH-Midazolam 12/14/2023 Not Detected   Final    Comment: INTERPRETIVE INFORMATION:Alpha-OH-Midazolam, U  Positive Cutoff: 20 ng/mL  Methodology: Mass Spectrometry      Zolpidem Metabolite 12/14/2023 Not Detected   Final    Comment: INTERPRETIVE  INFORMATION:Zolpidem Metabolite, U  Positive Cutoff: 100 ng/mL  Methodology: Mass Spectrometry      PAIN MANAGEMENT DRUG PANEL 12/14/2023 See Below   Final    Comment: Methodology: Qualitative Enzyme Immunoassay and Qualitative   Liquid Chromatography-Tandem Mass Spectrometry,   Quantitative Spectrophotometry  The absence of expected drug(s) and/or drug metabolite(s)   may indicate non-compliance, inappropriate timing of   specimen collection relative to drug administration, poor   drug absorption, diluted/adulterated urine, or limitations   of testing. The concentration must be greater than or equal   to the cutoff to be reported as present.  If specific drug   concentrations are required, contact the laboratory within   two weeks of specimen collection to request quantification   by a second analytical technique. Interpretive questions   should be directed to the laboratory.  Results based on immunoassay detection that do not match   clinical expectations should be  interpreted with caution. Confirmatory testing by mass   spectrometry for immunoassay-based results is available, if   ordered within two weeks of specimen collection. Additional   kathrin                           ges apply.  For medical purposes only; not valid for forensic use.  This test was developed and its performance characteristics   determined by [facility]. It has not been cleared or   approved by the [place] Food and Drug Administration. This test   was performed in a CLIA certified laboratory and is   intended for clinical purposes.      EER PAIN MGT HAYS,HIGH RES/EMIT, IN* 12/14/2023 See Note   Final    Comment: Authorized individuals can access the [facility]   Enhanced Report using the following link:     [email or web address]  Performed By: [facility]  [address] [place] [id number]  : Malcolm Mclaughlin MD, PhD  IA Number: [id number]     Lab Visit on 12/14/2023   Component  Date Value Ref Range Status    Hemoglobin A1C 12/14/2023 10.4 (H)  4.0 - 5.6 % Final    Comment: ADA Screening Guidelines:  5.7-6.4%  Consistent with prediabetes  >or=6.5%  Consistent with diabetes    High levels of fetal hemoglobin interfere with the HbA1C  assay. Heterozygous hemoglobin variants (HbS, HgC, etc)do  not significantly interfere with this assay.   However, presence of multiple variants may affect accuracy.      Estimated Avg Glucose 12/14/2023 252 (H)  68 - 131 mg/dL Final    Vit D, 25-Hydroxy 12/14/2023 38  30 - 96 ng/mL Final    Comment: Vitamin D deficiency.........<10 ng/mL                              Vitamin D insufficiency......10-29 ng/mL       Vitamin D sufficiency........> or equal to 30 ng/mL  Vitamin D toxicity............>100 ng/mL      WBC 12/14/2023 15.08 (H)  3.90 - 12.70 K/uL Final    RBC 12/14/2023 3.99 (L)  4.00 - 5.40 M/uL Final    Hemoglobin 12/14/2023 10.9 (L)  12.0 - 16.0 g/dL Final    Hematocrit 12/14/2023 34.7 (L)  37.0 - 48.5 % Final    MCV 12/14/2023 87  82 - 98 fL Final    MCH 12/14/2023 27.3  27.0 - 31.0 pg Final    MCHC 12/14/2023 31.4 (L)  32.0 - 36.0 g/dL Final    RDW 12/14/2023 15.2 (H)  11.5 - 14.5 % Final    Platelets 12/14/2023 311  150 - 450 K/uL Final    MPV 12/14/2023 11.4  9.2 - 12.9 fL Final       Imaging  No results found.    Assessment  1. Atherosclerosis of native coronary artery of native heart with stable angina pectoris  Stable on current medical regimen    2. Primary hypertension  Controlled    3. Mixed hyperlipidemia  Controlled    4. Aortic atherosclerosis  Continue to work on risk factor management    5. Severe obesity  Unchanged      Plan and Discussion    No change to current guideline directed medical therapy.    The ASCVD Risk score (Annamarie DK, et al., 2019) failed to calculate for the following reasons:    The patient has a prior MI or stroke diagnosis     Follow Up  Follow up in about 6 months (around 11/15/2024).      Jose L Méndez MD

## 2024-05-19 DIAGNOSIS — Z79.4 TYPE 2 DIABETES MELLITUS WITH HYPERGLYCEMIA, WITH LONG-TERM CURRENT USE OF INSULIN: Chronic | ICD-10-CM

## 2024-05-19 DIAGNOSIS — E11.65 TYPE 2 DIABETES MELLITUS WITH HYPERGLYCEMIA, WITH LONG-TERM CURRENT USE OF INSULIN: Chronic | ICD-10-CM

## 2024-05-22 ENCOUNTER — TELEPHONE (OUTPATIENT)
Dept: PAIN MEDICINE | Facility: CLINIC | Age: 79
End: 2024-05-22
Payer: MEDICARE

## 2024-05-22 ENCOUNTER — HOSPITAL ENCOUNTER (OUTPATIENT)
Facility: OTHER | Age: 79
Discharge: HOME | End: 2024-05-23
Attending: EMERGENCY MEDICINE | Admitting: EMERGENCY MEDICINE
Payer: MEDICARE

## 2024-05-22 DIAGNOSIS — R73.9 HYPERGLYCEMIA: Primary | ICD-10-CM

## 2024-05-22 DIAGNOSIS — N17.9 AKI (ACUTE KIDNEY INJURY): ICD-10-CM

## 2024-05-22 DIAGNOSIS — R06.02 SOB (SHORTNESS OF BREATH): ICD-10-CM

## 2024-05-22 DIAGNOSIS — R07.9 CHEST PAIN: ICD-10-CM

## 2024-05-22 DIAGNOSIS — M17.12 PRIMARY OSTEOARTHRITIS OF LEFT KNEE: Primary | ICD-10-CM

## 2024-05-22 DIAGNOSIS — I10 ESSENTIAL HYPERTENSION: ICD-10-CM

## 2024-05-22 LAB
ALBUMIN SERPL BCP-MCNC: 3.4 G/DL (ref 3.5–5.2)
ALP SERPL-CCNC: 56 U/L (ref 55–135)
ALT SERPL W/O P-5'-P-CCNC: 16 U/L (ref 10–44)
ANION GAP SERPL CALC-SCNC: 15 MMOL/L (ref 8–16)
AST SERPL-CCNC: 15 U/L (ref 10–40)
B-OH-BUTYR BLD STRIP-SCNC: 0.1 MMOL/L (ref 0–0.5)
BASOPHILS # BLD AUTO: 0.06 K/UL (ref 0–0.2)
BASOPHILS NFR BLD: 0.5 % (ref 0–1.9)
BILIRUB SERPL-MCNC: 0.1 MG/DL (ref 0.1–1)
BNP SERPL-MCNC: 184 PG/ML (ref 0–99)
BUN SERPL-MCNC: 21 MG/DL (ref 8–23)
CALCIUM SERPL-MCNC: 9.2 MG/DL (ref 8.7–10.5)
CHLORIDE SERPL-SCNC: 101 MMOL/L (ref 95–110)
CO2 SERPL-SCNC: 21 MMOL/L (ref 23–29)
CREAT SERPL-MCNC: 1.5 MG/DL (ref 0.5–1.4)
DIFFERENTIAL METHOD BLD: ABNORMAL
EOSINOPHIL # BLD AUTO: 0.5 K/UL (ref 0–0.5)
EOSINOPHIL NFR BLD: 4.3 % (ref 0–8)
ERYTHROCYTE [DISTWIDTH] IN BLOOD BY AUTOMATED COUNT: 17.1 % (ref 11.5–14.5)
EST. GFR  (NO RACE VARIABLE): 35 ML/MIN/1.73 M^2
FIO2: 28
GLUCOSE SERPL-MCNC: 387 MG/DL (ref 70–110)
HCO3 UR-SCNC: 26.1 MMOL/L (ref 24–28)
HCT VFR BLD AUTO: 32.4 % (ref 37–48.5)
HCT VFR BLD CALC: 31 %PCV (ref 36–54)
HGB BLD-MCNC: 10.2 G/DL (ref 12–16)
HGB BLD-MCNC: 11 G/DL
IMM GRANULOCYTES # BLD AUTO: 0.08 K/UL (ref 0–0.04)
IMM GRANULOCYTES NFR BLD AUTO: 0.7 % (ref 0–0.5)
LYMPHOCYTES # BLD AUTO: 2.3 K/UL (ref 1–4.8)
LYMPHOCYTES NFR BLD: 20.3 % (ref 18–48)
MCH RBC QN AUTO: 27.6 PG (ref 27–31)
MCHC RBC AUTO-ENTMCNC: 31.5 G/DL (ref 32–36)
MCV RBC AUTO: 88 FL (ref 82–98)
MONOCYTES # BLD AUTO: 0.7 K/UL (ref 0.3–1)
MONOCYTES NFR BLD: 6.3 % (ref 4–15)
NEUTROPHILS # BLD AUTO: 7.8 K/UL (ref 1.8–7.7)
NEUTROPHILS NFR BLD: 67.9 % (ref 38–73)
NRBC BLD-RTO: 0 /100 WBC
PCO2 BLDA: 48 MMHG (ref 35–45)
PH SMN: 7.34 [PH] (ref 7.35–7.45)
PLATELET # BLD AUTO: 263 K/UL (ref 150–450)
PMV BLD AUTO: 11.1 FL (ref 9.2–12.9)
PO2 BLDA: 102 MMHG (ref 80–100)
POC BE: 0 MMOL/L
POC SATURATED O2: 97 % (ref 95–100)
POC TCO2: 28 MMOL/L (ref 23–27)
POCT GLUCOSE: 255 MG/DL (ref 70–110)
POCT GLUCOSE: 256 MG/DL (ref 70–110)
POCT GLUCOSE: 334 MG/DL (ref 70–110)
POCT GLUCOSE: 382 MG/DL (ref 70–110)
POTASSIUM SERPL-SCNC: 4.2 MMOL/L (ref 3.5–5.1)
PROT SERPL-MCNC: 6.8 G/DL (ref 6–8.4)
RBC # BLD AUTO: 3.69 M/UL (ref 4–5.4)
SAMPLE: ABNORMAL
SODIUM SERPL-SCNC: 137 MMOL/L (ref 136–145)
TROPONIN I SERPL DL<=0.01 NG/ML-MCNC: 0.02 NG/ML (ref 0–0.03)
TROPONIN I SERPL DL<=0.01 NG/ML-MCNC: <0.006 NG/ML (ref 0–0.03)
WBC # BLD AUTO: 11.55 K/UL (ref 3.9–12.7)

## 2024-05-22 PROCEDURE — 93005 ELECTROCARDIOGRAM TRACING: CPT

## 2024-05-22 PROCEDURE — 36415 COLL VENOUS BLD VENIPUNCTURE: CPT

## 2024-05-22 PROCEDURE — 93010 ELECTROCARDIOGRAM REPORT: CPT | Mod: 76,,, | Performed by: INTERNAL MEDICINE

## 2024-05-22 PROCEDURE — 83880 ASSAY OF NATRIURETIC PEPTIDE: CPT | Performed by: NURSE PRACTITIONER

## 2024-05-22 PROCEDURE — 96360 HYDRATION IV INFUSION INIT: CPT

## 2024-05-22 PROCEDURE — 63600175 PHARM REV CODE 636 W HCPCS

## 2024-05-22 PROCEDURE — 80053 COMPREHEN METABOLIC PANEL: CPT | Performed by: NURSE PRACTITIONER

## 2024-05-22 PROCEDURE — 93010 ELECTROCARDIOGRAM REPORT: CPT | Mod: ,,, | Performed by: INTERNAL MEDICINE

## 2024-05-22 PROCEDURE — 36415 COLL VENOUS BLD VENIPUNCTURE: CPT | Performed by: NURSE PRACTITIONER

## 2024-05-22 PROCEDURE — 84484 ASSAY OF TROPONIN QUANT: CPT | Mod: 91

## 2024-05-22 PROCEDURE — 25000003 PHARM REV CODE 250

## 2024-05-22 PROCEDURE — 96372 THER/PROPH/DIAG INJ SC/IM: CPT

## 2024-05-22 PROCEDURE — 36600 WITHDRAWAL OF ARTERIAL BLOOD: CPT

## 2024-05-22 PROCEDURE — 99285 EMERGENCY DEPT VISIT HI MDM: CPT | Mod: 25

## 2024-05-22 PROCEDURE — 85025 COMPLETE CBC W/AUTO DIFF WBC: CPT | Performed by: NURSE PRACTITIONER

## 2024-05-22 PROCEDURE — G0378 HOSPITAL OBSERVATION PER HR: HCPCS

## 2024-05-22 PROCEDURE — 84484 ASSAY OF TROPONIN QUANT: CPT | Performed by: NURSE PRACTITIONER

## 2024-05-22 PROCEDURE — 25000003 PHARM REV CODE 250: Mod: UD

## 2024-05-22 PROCEDURE — 82962 GLUCOSE BLOOD TEST: CPT | Mod: 91

## 2024-05-22 PROCEDURE — 99900035 HC TECH TIME PER 15 MIN (STAT)

## 2024-05-22 PROCEDURE — 82010 KETONE BODYS QUAN: CPT | Performed by: NURSE PRACTITIONER

## 2024-05-22 RX ORDER — ONDANSETRON 8 MG/1
8 TABLET, ORALLY DISINTEGRATING ORAL EVERY 8 HOURS PRN
Status: DISCONTINUED | OUTPATIENT
Start: 2024-05-22 | End: 2024-05-23 | Stop reason: HOSPADM

## 2024-05-22 RX ORDER — METHOCARBAMOL 500 MG/1
500 TABLET, FILM COATED ORAL 3 TIMES DAILY PRN
Status: DISCONTINUED | OUTPATIENT
Start: 2024-05-22 | End: 2024-05-23 | Stop reason: HOSPADM

## 2024-05-22 RX ORDER — FUROSEMIDE 40 MG/1
40 TABLET ORAL DAILY
Status: DISCONTINUED | OUTPATIENT
Start: 2024-05-23 | End: 2024-05-23 | Stop reason: HOSPADM

## 2024-05-22 RX ORDER — SODIUM CHLORIDE 0.9 % (FLUSH) 0.9 %
10 SYRINGE (ML) INJECTION
Status: DISCONTINUED | OUTPATIENT
Start: 2024-05-22 | End: 2024-05-23 | Stop reason: HOSPADM

## 2024-05-22 RX ORDER — SERTRALINE HYDROCHLORIDE 50 MG/1
100 TABLET, FILM COATED ORAL NIGHTLY
Status: DISCONTINUED | OUTPATIENT
Start: 2024-05-22 | End: 2024-05-23 | Stop reason: HOSPADM

## 2024-05-22 RX ORDER — AMOXICILLIN 250 MG
1 CAPSULE ORAL 2 TIMES DAILY
Status: DISCONTINUED | OUTPATIENT
Start: 2024-05-22 | End: 2024-05-23 | Stop reason: HOSPADM

## 2024-05-22 RX ORDER — PREGABALIN 75 MG/1
150 CAPSULE ORAL 2 TIMES DAILY
Status: DISCONTINUED | OUTPATIENT
Start: 2024-05-22 | End: 2024-05-23 | Stop reason: HOSPADM

## 2024-05-22 RX ORDER — NITROGLYCERIN 0.4 MG/1
0.4 TABLET SUBLINGUAL EVERY 5 MIN PRN
Status: DISCONTINUED | OUTPATIENT
Start: 2024-05-22 | End: 2024-05-23 | Stop reason: HOSPADM

## 2024-05-22 RX ORDER — HYDROCODONE BITARTRATE AND ACETAMINOPHEN 10; 325 MG/1; MG/1
1 TABLET ORAL EVERY 8 HOURS PRN
Status: DISCONTINUED | OUTPATIENT
Start: 2024-05-22 | End: 2024-05-23 | Stop reason: HOSPADM

## 2024-05-22 RX ORDER — SERTRALINE HYDROCHLORIDE 50 MG/1
100 TABLET, FILM COATED ORAL DAILY
Status: DISCONTINUED | OUTPATIENT
Start: 2024-05-23 | End: 2024-05-22

## 2024-05-22 RX ORDER — METOPROLOL SUCCINATE 50 MG/1
100 TABLET, EXTENDED RELEASE ORAL DAILY
Status: DISCONTINUED | OUTPATIENT
Start: 2024-05-23 | End: 2024-05-23 | Stop reason: HOSPADM

## 2024-05-22 RX ORDER — INSULIN GLARGINE 100 [IU]/ML
18 INJECTION, SOLUTION SUBCUTANEOUS NIGHTLY
Status: DISCONTINUED | OUTPATIENT
Start: 2024-05-22 | End: 2024-05-22

## 2024-05-22 RX ORDER — CLONIDINE HYDROCHLORIDE 0.1 MG/1
0.1 TABLET ORAL 2 TIMES DAILY
Status: DISCONTINUED | OUTPATIENT
Start: 2024-05-22 | End: 2024-05-23 | Stop reason: HOSPADM

## 2024-05-22 RX ORDER — LOSARTAN POTASSIUM 50 MG/1
50 TABLET ORAL DAILY
Status: DISCONTINUED | OUTPATIENT
Start: 2024-05-23 | End: 2024-05-23 | Stop reason: HOSPADM

## 2024-05-22 RX ORDER — TALC
6 POWDER (GRAM) TOPICAL NIGHTLY PRN
Status: DISCONTINUED | OUTPATIENT
Start: 2024-05-22 | End: 2024-05-23 | Stop reason: HOSPADM

## 2024-05-22 RX ORDER — GLUCAGON 1 MG
1 KIT INJECTION
Status: DISCONTINUED | OUTPATIENT
Start: 2024-05-22 | End: 2024-05-23 | Stop reason: HOSPADM

## 2024-05-22 RX ORDER — METOCLOPRAMIDE 5 MG/1
5 TABLET ORAL
Status: DISCONTINUED | OUTPATIENT
Start: 2024-05-23 | End: 2024-05-23 | Stop reason: HOSPADM

## 2024-05-22 RX ORDER — INSULIN ASPART 100 [IU]/ML
0-5 INJECTION, SOLUTION INTRAVENOUS; SUBCUTANEOUS
Status: DISCONTINUED | OUTPATIENT
Start: 2024-05-22 | End: 2024-05-23 | Stop reason: HOSPADM

## 2024-05-22 RX ORDER — ACETAMINOPHEN 325 MG/1
650 TABLET ORAL EVERY 8 HOURS PRN
Status: DISCONTINUED | OUTPATIENT
Start: 2024-05-22 | End: 2024-05-23 | Stop reason: HOSPADM

## 2024-05-22 RX ORDER — ALLOPURINOL 300 MG/1
300 TABLET ORAL DAILY
Status: DISCONTINUED | OUTPATIENT
Start: 2024-05-23 | End: 2024-05-23 | Stop reason: HOSPADM

## 2024-05-22 RX ORDER — NIFEDIPINE 30 MG/1
30 TABLET, EXTENDED RELEASE ORAL 2 TIMES DAILY
Status: DISCONTINUED | OUTPATIENT
Start: 2024-05-22 | End: 2024-05-23 | Stop reason: HOSPADM

## 2024-05-22 RX ORDER — IBUPROFEN 200 MG
24 TABLET ORAL
Status: DISCONTINUED | OUTPATIENT
Start: 2024-05-22 | End: 2024-05-23 | Stop reason: HOSPADM

## 2024-05-22 RX ORDER — ASPIRIN 81 MG/1
81 TABLET ORAL DAILY
Status: DISCONTINUED | OUTPATIENT
Start: 2024-05-23 | End: 2024-05-23 | Stop reason: HOSPADM

## 2024-05-22 RX ORDER — IBUPROFEN 200 MG
16 TABLET ORAL
Status: DISCONTINUED | OUTPATIENT
Start: 2024-05-22 | End: 2024-05-23 | Stop reason: HOSPADM

## 2024-05-22 RX ADMIN — SODIUM CHLORIDE 500 ML: 9 INJECTION, SOLUTION INTRAVENOUS at 05:05

## 2024-05-22 RX ADMIN — INSULIN DETEMIR 18 UNITS: 100 INJECTION, SOLUTION SUBCUTANEOUS at 09:05

## 2024-05-22 RX ADMIN — METHOCARBAMOL 500 MG: 500 TABLET ORAL at 09:05

## 2024-05-22 RX ADMIN — NIFEDIPINE 30 MG: 30 TABLET, FILM COATED, EXTENDED RELEASE ORAL at 09:05

## 2024-05-22 RX ADMIN — PREGABALIN 150 MG: 75 CAPSULE ORAL at 09:05

## 2024-05-22 RX ADMIN — SENNOSIDES AND DOCUSATE SODIUM 1 TABLET: 8.6; 5 TABLET ORAL at 09:05

## 2024-05-22 RX ADMIN — SERTRALINE HYDROCHLORIDE 100 MG: 50 TABLET ORAL at 09:05

## 2024-05-22 RX ADMIN — HYDROCODONE BITARTRATE AND ACETAMINOPHEN 1 TABLET: 10; 325 TABLET ORAL at 09:05

## 2024-05-22 RX ADMIN — CLONIDINE HYDROCHLORIDE 0.1 MG: 0.1 TABLET ORAL at 09:05

## 2024-05-22 RX ADMIN — INSULIN ASPART 3 UNITS: 100 INJECTION, SOLUTION INTRAVENOUS; SUBCUTANEOUS at 07:05

## 2024-05-22 NOTE — FIRST PROVIDER EVALUATION
" Emergency Department TeleTriage Encounter Note      CHIEF COMPLAINT    Chief Complaint   Patient presents with    Chest Pain     Constant L sided stabbing chest pain onset 1 hour ago, took 4 nitro over past 30 mins with some relief of pain. Per daughter, pt began to become "shaky" and she was concerned that her sugar was low and gave her root leora, MICHELLE in triage 334.       VITAL SIGNS   Initial Vitals [05/22/24 1404]   BP Pulse Resp Temp SpO2   131/70 103 20 98.3 °F (36.8 °C) (!) 93 %      MAP       --            ALLERGIES    Review of patient's allergies indicates:   Allergen Reactions    Bleach (sodium hypochlorite) Shortness Of Breath    Nitrofurantoin macrocrystalline Anaphylaxis    Lipitor [atorvastatin] Diarrhea and Rash    Nsaids (non-steroidal anti-inflammatory drug)      Tolerates aspirin      Pcn [penicillins]     Toradol [ketorolac]        PROVIDER TRIAGE NOTE  79 year old female presents to the ER with complaints of CP, SOB and all over body shakiness and fatigue that began 1 hour prior to arrival. Took SL nitro prior to arrival without relief. Hx of CHF with worsening lower leg edema bilaterally according to pt. No recent cough or fever. Pain is constant to chest. Non radiating. No syncopal episodes.      AAOx3, respirations even and non- labored, stable vitals, normal coloration of skin, sitting upright in triage chair, appears in no acute distress.          ORDERS  Labs Reviewed   POCT GLUCOSE - Abnormal; Notable for the following components:       Result Value    POCT Glucose 334 (*)     All other components within normal limits       ED Orders (720h ago, onward)      Start Ordered     Status Ordering Provider    05/22/24 1407 05/22/24 1406  EKG 12-lead  Once         Completed by KOSTAS AKINS on 5/22/2024 at  2:07 PM DAE CLEMONS    05/22/24 1359 05/22/24 1359  POCT glucose  Once         Final result EMERGENCY, DEPT PHYSICIAN              Virtual Visit Note: The provider triage portion of " this emergency department evaluation and documentation was performed via "[employer], a HIPAA-compliant telemedicine application, in concert with a tele-presenter in the room. A face to face patient evaluation with one of my colleagues will occur once the patient is placed in an emergency department room.      DISCLAIMER: This note was prepared with VDP voice recognition transcription software. Garbled syntax, mangled pronouns, and other bizarre constructions may be attributed to that software system.

## 2024-05-22 NOTE — H&P
"ED Observation Unit  History and Physical      I assumed care of this patient from the Main ED at onset of observation time, 17:10 on 2024.     History of Present Illness:  Indira Waters is a 79 y.o. female, with a PMHx of diabetes, high cholesterol, and low blood pressure who presents to the ED with SOB and chest pain since this morning. She notes upon waking up she experiences SOB and about an hour later she experienced chest pain. Patient took nitro for her symptoms prior to arrival which resolved her symptoms. At this time, patient does not have any complaints of chest pain. She also notes of feeling "shaky." The patient reports that her sugar was high this morning. The patient reports she is currently on Asprin but denies blood thinners. She denies DVT. She reports of being "nauseated." She denies fever and chills. This is the extent of the patient's complaints today in the Emergency Department.      ED Course:  - CXR with stable chest, no acute cardiopulmonary process  - EKG sinus tachycardia with occasional PVCs, no ischemia  - initial troponin normal, BNP mildly elevated at 134  - CBC with stable anemia  - CMP with glucose 387.  Mild RANJEET with creatinine 1.5  - Remains without chest pain    I reviewed the ED Provider Note dated 2024 prior to my evaluation of this patient.  I reviewed all labs and imaging performed in the Main ED, prior to patient being placed in Observation. Patient was placed in the ED Observation Unit for hyperglycemia.    PMHx   Past Medical History:   Diagnosis Date    Arthritis     Back pain     Cancer     ovarian    Cervical cancer     Coronary artery disease     Depression     Diabetes mellitus     Fibromyalgia     Heart attack     History of MI (myocardial infarction)     Hyperlipidemia     Hypertension     Lupus     Stroke     slight left sided weakness      Past Surgical History:   Procedure Laterality Date    APPENDECTOMY       SECTION      2    CORONARY " ANGIOGRAPHY N/A 05/06/2022    Procedure: ANGIOGRAM, CORONARY ARTERY;  Surgeon: Jose L Méndez MD;  Location: [facility] CATH LAB;  Service: Cardiology;  Laterality: N/A;    HYSTERECTOMY      with USO for cervical cancer    INJECTION OF ANESTHETIC AGENT AROUND NERVE Bilateral 05/05/2021    Procedure: BLOCK, NERVE, SYMPATHIC;  Surgeon: Holden Pereira MD;  Location: [facility] PAIN MGT;  Service: Pain Management;  Laterality: Bilateral;    INJECTION OF ANESTHETIC AGENT AROUND NERVE N/A 08/25/2021    Procedure: BLOCK, NERVE, SYMPATHETIC  need consent;  Surgeon: Holden Pereira MD;  Location: [facility] PAIN MGT;  Service: Pain Management;  Laterality: N/A;    ND EVAL,SWALLOW FUNCTION,CINE/VIDEO RECORD  09/09/2021         TONSILLECTOMY      TRIAL OF SPINAL CORD NERVE STIMULATOR N/A 3/8/2023    Procedure: LUMBAR SPINAL CORD STIMULATOR TRIAL NEVRO REP PATIENT STATES SHE NO LONGER TAKES PLAVIX;  Surgeon: Holden Pereira MD;  Location: [facility] PAIN MGT;  Service: Pain Management;  Laterality: N/A;        Family Hx   Family History   Problem Relation Name Age of Onset    COPD Mother      Lupus Mother      Hernia Mother      Uterine cancer Mother          vs cervical cancer    Ovarian cancer Mother      Diabetes Father      Coronary artery disease Father      Colon cancer Maternal Grandmother          in her 50's        Social Hx   Social History     Socioeconomic History    Marital status:    Tobacco Use    Smoking status: Former     Current packs/day: 0.00     Types: Cigarettes     Quit date: 11/2020     Years since quitting: 3.5     Passive exposure: Past    Smokeless tobacco: Never   Substance and Sexual Activity    Alcohol use: Not Currently     Comment: occasionally    Drug use: Yes     Types: Hydrocodone     Comment: three times a day    Sexual activity: Not Currently   Social History Narrative    Lives with daughter. .      Social Determinants of Health     Financial Resource Strain: Low Risk  (2/27/2024)     Overall Financial Resource Strain (CARDIA)     Difficulty of Paying Living Expenses: Not hard at all   Food Insecurity: Unknown (2/27/2024)    Hunger Vital Sign     Worried About Running Out of Food in the Last Year: Patient declined     Ran Out of Food in the Last Year: Never true   Transportation Needs: No Transportation Needs (2/27/2024)    PRAPARE - Transportation     Lack of Transportation (Medical): No     Lack of Transportation (Non-Medical): No   Physical Activity: Insufficiently Active (2/27/2024)    Exercise Vital Sign     Days of Exercise per Week: 2 days     Minutes of Exercise per Session: 10 min   Stress: No Stress Concern Present (2/27/2024)    [employer] - Occupational Stress Questionnaire     Feeling of Stress : Not at all   Housing Stability: Unknown (2/27/2024)    Housing Stability Vital Sign     Unable to Pay for Housing in the Last Year: No     Unstable Housing in the Last Year: No        Vital Signs   Vitals:    05/22/24 1445 05/22/24 1527 05/22/24 1608 05/22/24 1610   BP:  (!) 167/72 (!) 159/71    BP Location:  Left arm     Patient Position:  Sitting     Pulse: 94 93  90   Resp:  20  12   Temp:       TempSrc:       SpO2:  98%  98%   Weight:       Height:            Review of Systems  Review of Systems   Constitutional:  Negative for chills and fever.   HENT:  Negative for congestion, nosebleeds and sore throat.    Eyes:  Negative for blurred vision, double vision and photophobia.   Respiratory:  Positive for shortness of breath. Negative for cough.    Cardiovascular:  Positive for chest pain. Negative for claudication and leg swelling.   Gastrointestinal:  Negative for diarrhea, nausea and vomiting.   Genitourinary:  Negative for dysuria and urgency.   Musculoskeletal:  Negative for back pain and neck pain.   Skin:  Negative for itching and rash.   Neurological:  Negative for dizziness, weakness and headaches.       Physical Exam  Physical Exam  Constitutional:        General: She is not in acute distress.     Appearance: Normal appearance. She is not toxic-appearing.   HENT:      Head: Normocephalic and atraumatic.      Nose: Nose normal.      Mouth/Throat:      Mouth: Mucous membranes are moist.   Cardiovascular:      Rate and Rhythm: Normal rate and regular rhythm.   Pulmonary:      Effort: Pulmonary effort is normal. No respiratory distress.   Abdominal:      General: Abdomen is flat. There is no distension.   Musculoskeletal:         General: Normal range of motion.      Cervical back: Normal range of motion.   Skin:     General: Skin is warm and dry.   Neurological:      General: No focal deficit present.      Mental Status: She is alert and oriented to person, place, and time. Mental status is at baseline.   Psychiatric:         Mood and Affect: Mood normal.         Behavior: Behavior normal.       Medications:   Scheduled Meds:   [START ON 5/23/2024] allopurinoL  300 mg Oral Daily    [START ON 5/23/2024] aspirin  81 mg Oral Daily    capsaicin-skin cleanser  1 patch Topical (Top) 1 time in Clinic/HOD    cloNIDine  0.1 mg Oral BID    [START ON 5/23/2024] furosemide  40 mg Oral Daily    insulin glargine U-100  18 Units Subcutaneous QHS    [START ON 5/23/2024] losartan  50 mg Oral Daily    [START ON 5/23/2024] metoclopramide HCl  5 mg Oral TID AC    [START ON 5/23/2024] metoprolol succinate  100 mg Oral Daily    NIFEdipine  30 mg Oral BID    pregabalin  150 mg Oral BID    senna-docusate 8.6-50 mg  1 tablet Oral BID    [START ON 5/23/2024] sertraline  100 mg Oral Daily    sodium chloride 0.9%  500 mL Intravenous ED 1 Time     Continuous Infusions:  PRN Meds:.  Current Facility-Administered Medications:     acetaminophen, 650 mg, Oral, Q8H PRN    dextrose 10%, 12.5 g, Intravenous, PRN    dextrose 10%, 25 g, Intravenous, PRN    glucagon (human recombinant), 1 mg, Intramuscular, PRN    glucose, 16 g, Oral, PRN    glucose, 24 g, Oral, PRN    HYDROcodone-acetaminophen, 1  tablet, Oral, Q8H PRN    insulin aspart U-100, 0-5 Units, Subcutaneous, QID (AC + HS) PRN    melatonin, 6 mg, Oral, Nightly PRN    methocarbamoL, 500 mg, Oral, TID PRN    nitroGLYCERIN, 0.4 mg, Sublingual, Q5 Min PRN    ondansetron, 8 mg, Oral, Q8H PRN    sodium chloride 0.9%, 10 mL, Intravenous, PRN      Assessment/Plan:  Chest pain  - trend troponin  - cardiac monitoring  - cardiology consultation in the morning    2.   Hyperglycemia  3.   RANJEET  - soft IV hydration  - SSI, diabetic diet  - recheck labs     Case was discussed with the ED provider, Dr. Shay Reyes

## 2024-05-22 NOTE — ED NOTES
Pt arrived to EDOU room 329 in wheelchair from ED bed 1, pt ambulated from wheelchair to bed with assistance, no acute distress noted at this time, breathing even and unlabored, family at bedside, pt denies any pain at this time, bed in lowest position with wheels locked, bed rails up x2, call light in reach, treatment ongoing.

## 2024-05-22 NOTE — Clinical Note
Diagnosis: Hyperglycemia [767305]   Future Attending Provider: DAE CLEMONS [[id number]]   Is the patient being sent to ED Observation?: Yes

## 2024-05-22 NOTE — ED TRIAGE NOTES
1 hr onset sharp, left sided CP that began at rest. Denies N/V, SOB but reports h/o CHF. States she has been feeling fatigued over past several days but denies cough/congestion. No fever reported. Presents awake, alert. Resp unlabored. Poor historian.

## 2024-05-22 NOTE — ED PROVIDER NOTES
"Encounter Date: 5/22/2024    SCRIBE #1 NOTE: I, Dorothy Cohen, am scribing for, and in the presence of,  Shay Reyes MD. I have scribed the following portions of the note - Other sections scribed: HPI, ROS, PE.       History     Chief Complaint   Patient presents with    Chest Pain     Constant L sided stabbing chest pain onset 1 hour ago, took 4 nitro over past 30 mins with some relief of pain. Per daughter, pt began to become "shaky" and she was concerned that her sugar was low and gave her root MICHELLE corey in triage 334.     Time seen by provider: 2:43 PM    Indira Waters is a 79 y.o. female, with a PMHx of diabetes, high cholesterol, and low blood pressure who presents to the ED with SOB and chest pain. The patient reports symptoms onset this morning. She notes upon waking up she experiences SOB and about an hour later she experienced chest pain. She also noes of feeling "shaky." The patient reports that her sugar was high this morning. She notes of having 4 strokes and heart attacks. The patient reports the symptoms have improved since being here. She reports that a STEMI has attempted by Dr. Hall at [facility] but was informed there was an issue with her blood vessels and the procedure could not be done. The patient reports she is currently on Asprin but denies blood thinners. She denies DVT. She reports of being "nauseated." She denies fever and chills. This is the extent of the patient's complaints today in the Emergency Department.      The history is provided by the patient and a relative.     Review of patient's allergies indicates:   Allergen Reactions    Bleach (sodium hypochlorite) Shortness Of Breath    Nitrofurantoin macrocrystalline Anaphylaxis    Lipitor [atorvastatin] Diarrhea and Rash    Nsaids (non-steroidal anti-inflammatory drug)      Tolerates aspirin      Pcn [penicillins]     Toradol [ketorolac]      Past Medical History:   Diagnosis Date    Arthritis     Back pain     Cancer     " ovarian    Cervical cancer     Coronary artery disease     Depression     Diabetes mellitus     Fibromyalgia     Heart attack     History of MI (myocardial infarction)     Hyperlipidemia     Hypertension     Lupus     Stroke     slight left sided weakness     Past Surgical History:   Procedure Laterality Date    APPENDECTOMY       SECTION      2    CORONARY ANGIOGRAPHY N/A 2022    Procedure: ANGIOGRAM, CORONARY ARTERY;  Surgeon: Jose L Méndez MD;  Location: [facility] CATH LAB;  Service: Cardiology;  Laterality: N/A;    HYSTERECTOMY      with USO for cervical cancer    INJECTION OF ANESTHETIC AGENT AROUND NERVE Bilateral 2021    Procedure: BLOCK, NERVE, SYMPATHIC;  Surgeon: Holden Pereira MD;  Location: [facility] PAIN MGT;  Service: Pain Management;  Laterality: Bilateral;    INJECTION OF ANESTHETIC AGENT AROUND NERVE N/A 2021    Procedure: BLOCK, NERVE, SYMPATHETIC  need consent;  Surgeon: Holden Pereira MD;  Location: [facility] PAIN MGT;  Service: Pain Management;  Laterality: N/A;    KS EVAL,SWALLOW FUNCTION,CINE/VIDEO RECORD  2021         TONSILLECTOMY      TRIAL OF SPINAL CORD NERVE STIMULATOR N/A 3/8/2023    Procedure: LUMBAR SPINAL CORD STIMULATOR TRIAL NEVRO REP PATIENT STATES SHE NO LONGER TAKES PLAVIX;  Surgeon: Holden Pereira MD;  Location: [facility] PAIN MGT;  Service: Pain Management;  Laterality: N/A;     Family History   Problem Relation Name Age of Onset    COPD Mother      Lupus Mother      Hernia Mother      Uterine cancer Mother          vs cervical cancer    Ovarian cancer Mother      Diabetes Father      Coronary artery disease Father      Colon cancer Maternal Grandmother          in her 50's     Social History     Tobacco Use    Smoking status: Former     Current packs/day: 0.00     Types: Cigarettes     Quit date: 2020     Years since quitting: 3.5     Passive exposure: Past    Smokeless tobacco: Never   Substance Use Topics    Alcohol use: Not Currently      Comment: occasionally    Drug use: Yes     Types: Hydrocodone     Comment: three times a day     Review of Systems   Constitutional:  Negative for activity change, chills, diaphoresis and fever.   HENT:  Negative for ear pain, rhinorrhea, sore throat and trouble swallowing.    Eyes:  Negative for pain and visual disturbance.   Respiratory:  Positive for shortness of breath. Negative for cough and stridor.    Cardiovascular:  Positive for chest pain.   Gastrointestinal:  Positive for nausea. Negative for abdominal pain, blood in stool, diarrhea and vomiting.   Genitourinary:  Negative for dysuria, hematuria, vaginal bleeding and vaginal discharge.   Musculoskeletal:  Negative for gait problem.   Skin:  Negative for rash and wound.   Neurological:  Negative for seizures and headaches.   Psychiatric/Behavioral:  Negative for hallucinations and suicidal ideas.        Physical Exam     Initial Vitals [05/22/24 1404]   BP Pulse Resp Temp SpO2   131/70 103 20 98.3 °F (36.8 °C) (!) 93 %      MAP       --         Physical Exam    Nursing note and vitals reviewed.  Constitutional: She appears well-developed. She is not diaphoretic. No distress.   HENT:   Head: Normocephalic and atraumatic.   Nose: Nose normal.   Eyes: EOM are normal. No scleral icterus.   Neck: Neck supple.   Normal range of motion.  Cardiovascular:  Normal rate, regular rhythm, normal heart sounds and intact distal pulses.     Exam reveals no gallop and no friction rub.       No murmur heard.  Pulmonary/Chest: Breath sounds normal. No stridor. No respiratory distress. She has no wheezes. She has no rhonchi. She has no rales.   Abdominal: Abdomen is soft. Bowel sounds are normal. She exhibits no distension. There is no abdominal tenderness. There is no rebound and no guarding.   Musculoskeletal:         General: Normal range of motion.      Cervical back: Normal range of motion and neck supple.     Neurological: She is alert and oriented to person, place, and  time. She has normal strength. No cranial nerve deficit or sensory deficit.   Skin: Skin is warm and dry. Capillary refill takes less than 2 seconds. No rash noted.   Psychiatric: She has a normal mood and affect.         ED Course   Procedures  Labs Reviewed   CBC W/ AUTO DIFFERENTIAL - Abnormal; Notable for the following components:       Result Value    RBC 3.69 (*)     Hemoglobin 10.2 (*)     Hematocrit 32.4 (*)     MCHC 31.5 (*)     RDW 17.1 (*)     Immature Granulocytes 0.7 (*)     Gran # (ANC) 7.8 (*)     Immature Grans (Abs) 0.08 (*)     All other components within normal limits   B-TYPE NATRIURETIC PEPTIDE - Abnormal; Notable for the following components:     (*)     All other components within normal limits   COMPREHENSIVE METABOLIC PANEL - Abnormal; Notable for the following components:    CO2 21 (*)     Glucose 387 (*)     Creatinine 1.5 (*)     Albumin 3.4 (*)     eGFR 35 (*)     All other components within normal limits   POCT GLUCOSE - Abnormal; Notable for the following components:    POCT Glucose 334 (*)     All other components within normal limits   POCT GLUCOSE - Abnormal; Notable for the following components:    POCT Glucose 382 (*)     All other components within normal limits   ISTAT PROCEDURE - Abnormal; Notable for the following components:    POC PH 7.344 (*)     POC PCO2 48.0 (*)     POC PO2 102 (*)     POC TCO2 28 (*)     POC Hematocrit 31 (*)     All other components within normal limits   POCT GLUCOSE - Abnormal; Notable for the following components:    POCT Glucose 256 (*)     All other components within normal limits   POCT GLUCOSE - Abnormal; Notable for the following components:    POCT Glucose 255 (*)     All other components within normal limits   TROPONIN I   BETA - HYDROXYBUTYRATE, SERUM   TROPONIN I   POCT GLUCOSE MONITORING CONTINUOUS   POCT GLUCOSE MONITORING CONTINUOUS          Imaging Results              X-Ray Chest 1 View (Final result)  Result time 05/22/24  14:44:06      Final result by Karon Pennington MD (05/22/24 14:44:06)                   Impression:      Stable chest with no acute cardiopulmonary process seen.      Electronically signed by: Karon Pennington  Date:    05/22/2024  Time:    14:44               Narrative:    EXAMINATION:  XR CHEST 1 VIEW    CLINICAL HISTORY:  Shortness of breath    TECHNIQUE:  Single frontal view of the chest was performed.    COMPARISON:  12/15/2023    FINDINGS:  Lungs are well expanded.  No acute consolidation, pleural effusion, or pneumothorax.    Cardiac silhouette is normal in size.  Calcified plaque lines arch.                                       Medications   sodium chloride 0.9% flush 10 mL (has no administration in time range)   melatonin tablet 6 mg (has no administration in time range)   acetaminophen tablet 650 mg (has no administration in time range)   senna-docusate 8.6-50 mg per tablet 1 tablet (1 tablet Oral Given 5/22/24 2107)   glucose chewable tablet 16 g (has no administration in time range)   glucose chewable tablet 24 g (has no administration in time range)   glucagon (human recombinant) injection 1 mg (has no administration in time range)   insulin aspart U-100 pen 0-5 Units (3 Units Subcutaneous Given 5/22/24 1937)   ondansetron disintegrating tablet 8 mg (has no administration in time range)   dextrose 10% bolus 125 mL 125 mL (has no administration in time range)   dextrose 10% bolus 250 mL 250 mL (has no administration in time range)   allopurinoL tablet 300 mg (has no administration in time range)   aspirin EC tablet 81 mg (has no administration in time range)   cloNIDine tablet 0.1 mg (0.1 mg Oral Given 5/22/24 2107)   furosemide tablet 40 mg (has no administration in time range)   HYDROcodone-acetaminophen  mg per tablet 1 tablet (has no administration in time range)   losartan tablet 50 mg (has no administration in time range)   methocarbamoL tablet 500 mg (has no administration in time range)    metoprolol succinate (TOPROL-XL) 24 hr tablet 100 mg (has no administration in time range)   metoclopramide HCl tablet 5 mg (has no administration in time range)   NIFEdipine 24 hr tablet 30 mg (30 mg Oral Given 5/22/24 2107)   nitroGLYCERIN SL tablet 0.4 mg (has no administration in time range)   pregabalin capsule 150 mg (150 mg Oral Given 5/22/24 2107)   sertraline tablet 100 mg (has no administration in time range)   insulin detemir U-100 (Levemir) pen 18 Units (18 Units Subcutaneous Given 5/22/24 2108)   sodium chloride 0.9% bolus 500 mL 500 mL (0 mLs Intravenous Stopped 5/22/24 1827)     Medical Decision Making  Indira Waters is a 79 y.o. female with chest pain.    This is an emergent evaluation.  Patient is complaining of chest pain.  My differential diagnosis includes: Acute MI, unstable angina, stable angina, congestive heart failure, pulmonary embolism, pneumonia, pneumothorax, COPD/asthma, bronchitis, and mass.    Clinically, the patient does not have any symptoms of bronchitis, asthma, or COPD exacerbation.    An EKG was performed and was negative for ST segment elevation.  A chest x-ray was performed and was negative for signs of heart failure or pulmonary edema.  There was no evidence of pneumonia or pneumothorax or mass lesion.  Troponin was negative.  BNP was 184 but this is near patient's baseline per per chart review.  No evidence for NSTEMI or CHF.    This patient has multiple cardiac risk factors and previous cardiac catheterization showing severe disease. Risk stratification with serial cardiac markers and stress testing is warranted in this patient.  I believe the patient should be admitted for observation to the definitively rule out acute coronary syndrome in cardiology evaluation.    Will admit to ED obs unit for chest pain rule out with Cardiology evaluation morning.  Patient also hyperglycemia and mild RANJEET recommended gentle IV hydration given history of heart failure.    Amount  and/or Complexity of Data Reviewed  Labs: ordered. Decision-making details documented in ED Course.  Radiology:  Decision-making details documented in ED Course.    Risk  OTC drugs.  Prescription drug management.            Scribe Attestation:   Scribe #1: I performed the above scribed service and the documentation accurately describes the services I performed. I attest to the accuracy of the note.    Physician Attestation for Scribe: I, Shay Reyes, reviewed documentation as scribed in my presence, which is both accurate and complete.     ED Course as of 05/22/24 2112   Wed May 22, 2024   1445 Individually interpreted by me shows sinus tachycardia with occasional PVCs.  Rate of 106.  Normal intervals.  Nonspecific ST changes.  LVH.  No STEMI.   [BD]   1523 X-Ray Chest 1 View [BD]   1523 Impression:     Stable chest with no acute cardiopulmonary process seen.        Electronically signed by:Karon Pennington  Date:                                            05/22/2024  Time:                                           14:44   [BD]   1523 POCT Glucose(!): 382 [BD]   1642 79-year-old female with a past medical history of arthritis, DM, CAD, fibromyalgia, hypertension, hyperlipidemia, past MI, CVA and lupus pw chest pain and sob that began this morning. Relieved with SL NTGx 3 at home. EKG without STEMI. Labs show hyperglycemia and mild RANJEET of 1.5 baseline 1.2.  which is near baseline. Not in DKA.  [BD]      ED Course User Index  [BD] Shay Reyes MD                           Clinical Impression:  Final diagnoses:  [R07.9] Chest pain  [R06.02] SOB (shortness of breath)  [R73.9] Hyperglycemia (Primary)  [N17.9] RANJEET (acute kidney injury)          ED Disposition Condition    Observation Stable                Shay Reyes MD  05/22/24 2112

## 2024-05-22 NOTE — ED NOTES
Patient denies chest pain at this time. Respirations even and unlabored. Bed rails up x2 and call light in reach. Daughter at bedside.

## 2024-05-23 VITALS
TEMPERATURE: 98 F | HEIGHT: 60 IN | HEART RATE: 77 BPM | BODY MASS INDEX: 38.87 KG/M2 | SYSTOLIC BLOOD PRESSURE: 141 MMHG | DIASTOLIC BLOOD PRESSURE: 65 MMHG | OXYGEN SATURATION: 92 % | WEIGHT: 198 LBS | RESPIRATION RATE: 18 BRPM

## 2024-05-23 LAB
ALBUMIN SERPL BCP-MCNC: 3.2 G/DL (ref 3.5–5.2)
ALP SERPL-CCNC: 50 U/L (ref 55–135)
ALT SERPL W/O P-5'-P-CCNC: 14 U/L (ref 10–44)
ANION GAP SERPL CALC-SCNC: 9 MMOL/L (ref 8–16)
AST SERPL-CCNC: 15 U/L (ref 10–40)
BASOPHILS # BLD AUTO: 0.07 K/UL (ref 0–0.2)
BASOPHILS NFR BLD: 0.7 % (ref 0–1.9)
BILIRUB SERPL-MCNC: 0.2 MG/DL (ref 0.1–1)
BUN SERPL-MCNC: 19 MG/DL (ref 8–23)
CALCIUM SERPL-MCNC: 9 MG/DL (ref 8.7–10.5)
CHLORIDE SERPL-SCNC: 104 MMOL/L (ref 95–110)
CO2 SERPL-SCNC: 25 MMOL/L (ref 23–29)
CREAT SERPL-MCNC: 1.2 MG/DL (ref 0.5–1.4)
DIFFERENTIAL METHOD BLD: ABNORMAL
EOSINOPHIL # BLD AUTO: 0.5 K/UL (ref 0–0.5)
EOSINOPHIL NFR BLD: 5.3 % (ref 0–8)
ERYTHROCYTE [DISTWIDTH] IN BLOOD BY AUTOMATED COUNT: 16.8 % (ref 11.5–14.5)
EST. GFR  (NO RACE VARIABLE): 46 ML/MIN/1.73 M^2
GLUCOSE SERPL-MCNC: 242 MG/DL (ref 70–110)
HCT VFR BLD AUTO: 31.9 % (ref 37–48.5)
HGB BLD-MCNC: 10 G/DL (ref 12–16)
IMM GRANULOCYTES # BLD AUTO: 0.07 K/UL (ref 0–0.04)
IMM GRANULOCYTES NFR BLD AUTO: 0.7 % (ref 0–0.5)
LYMPHOCYTES # BLD AUTO: 3.2 K/UL (ref 1–4.8)
LYMPHOCYTES NFR BLD: 31.7 % (ref 18–48)
MCH RBC QN AUTO: 27.5 PG (ref 27–31)
MCHC RBC AUTO-ENTMCNC: 31.3 G/DL (ref 32–36)
MCV RBC AUTO: 88 FL (ref 82–98)
MONOCYTES # BLD AUTO: 0.7 K/UL (ref 0.3–1)
MONOCYTES NFR BLD: 6.9 % (ref 4–15)
NEUTROPHILS # BLD AUTO: 5.5 K/UL (ref 1.8–7.7)
NEUTROPHILS NFR BLD: 54.7 % (ref 38–73)
NRBC BLD-RTO: 0 /100 WBC
OHS QRS DURATION: 78 MS
OHS QRS DURATION: 80 MS
OHS QTC CALCULATION: 447 MS
OHS QTC CALCULATION: 464 MS
PLATELET # BLD AUTO: 237 K/UL (ref 150–450)
PMV BLD AUTO: 11.4 FL (ref 9.2–12.9)
POCT GLUCOSE: 275 MG/DL (ref 70–110)
POCT GLUCOSE: 377 MG/DL (ref 70–110)
POTASSIUM SERPL-SCNC: 4.5 MMOL/L (ref 3.5–5.1)
PROT SERPL-MCNC: 6.4 G/DL (ref 6–8.4)
RBC # BLD AUTO: 3.63 M/UL (ref 4–5.4)
SODIUM SERPL-SCNC: 138 MMOL/L (ref 136–145)
TROPONIN I SERPL DL<=0.01 NG/ML-MCNC: 0.01 NG/ML (ref 0–0.03)
WBC # BLD AUTO: 9.98 K/UL (ref 3.9–12.7)

## 2024-05-23 PROCEDURE — 25000003 PHARM REV CODE 250

## 2024-05-23 PROCEDURE — 82962 GLUCOSE BLOOD TEST: CPT | Mod: 91

## 2024-05-23 PROCEDURE — 80053 COMPREHEN METABOLIC PANEL: CPT

## 2024-05-23 PROCEDURE — 36415 COLL VENOUS BLD VENIPUNCTURE: CPT

## 2024-05-23 PROCEDURE — 85025 COMPLETE CBC W/AUTO DIFF WBC: CPT

## 2024-05-23 PROCEDURE — G0378 HOSPITAL OBSERVATION PER HR: HCPCS

## 2024-05-23 PROCEDURE — 99214 OFFICE O/P EST MOD 30 MIN: CPT | Mod: ,,, | Performed by: INTERNAL MEDICINE

## 2024-05-23 RX ORDER — METOPROLOL SUCCINATE 100 MG/1
100 TABLET, EXTENDED RELEASE ORAL DAILY
Qty: 90 TABLET | Refills: 3 | Status: SHIPPED | OUTPATIENT
Start: 2024-05-23

## 2024-05-23 RX ADMIN — CLONIDINE HYDROCHLORIDE 0.1 MG: 0.1 TABLET ORAL at 08:05

## 2024-05-23 RX ADMIN — INSULIN ASPART 3 UNITS: 100 INJECTION, SOLUTION INTRAVENOUS; SUBCUTANEOUS at 12:05

## 2024-05-23 RX ADMIN — ASPIRIN 81 MG: 81 TABLET, COATED ORAL at 08:05

## 2024-05-23 RX ADMIN — SENNOSIDES AND DOCUSATE SODIUM 1 TABLET: 8.6; 5 TABLET ORAL at 08:05

## 2024-05-23 RX ADMIN — LOSARTAN POTASSIUM 50 MG: 50 TABLET, FILM COATED ORAL at 08:05

## 2024-05-23 RX ADMIN — METOPROLOL SUCCINATE 100 MG: 50 TABLET, EXTENDED RELEASE ORAL at 08:05

## 2024-05-23 RX ADMIN — HYDROCODONE BITARTRATE AND ACETAMINOPHEN 1 TABLET: 10; 325 TABLET ORAL at 09:05

## 2024-05-23 RX ADMIN — METOCLOPRAMIDE 5 MG: 5 TABLET ORAL at 07:05

## 2024-05-23 RX ADMIN — ALLOPURINOL 300 MG: 300 TABLET ORAL at 08:05

## 2024-05-23 RX ADMIN — PREGABALIN 150 MG: 75 CAPSULE ORAL at 08:05

## 2024-05-23 RX ADMIN — FUROSEMIDE 40 MG: 40 TABLET ORAL at 08:05

## 2024-05-23 RX ADMIN — METHOCARBAMOL 500 MG: 500 TABLET ORAL at 09:05

## 2024-05-23 RX ADMIN — NIFEDIPINE 30 MG: 30 TABLET, FILM COATED, EXTENDED RELEASE ORAL at 08:05

## 2024-05-23 RX ADMIN — INSULIN ASPART 3 UNITS: 100 INJECTION, SOLUTION INTRAVENOUS; SUBCUTANEOUS at 08:05

## 2024-05-23 NOTE — ED NOTES
Dinner tray provided. . 3 units of insulin administered. Safety measures in place. Will continue to monitor.

## 2024-05-23 NOTE — CONSULTS
[facility]    Date of admission:  5/22/2024     Reason for Consult:    Chest pain    HPI:    This 79-year-old female is known to me for history of coronary artery disease with stable angina.  She was doing well when seen in the office earlier this week.  Yesterday morning she woke up feeling poorly.  She had some dyspnea when she woke up yesterday morning.  She became shaky.  Then she developed left chest discomfort.  Took several nitroglycerines before the pain was relieved.  Typically her angina is relieved after 1-2 nitroglycerines.  Came to the emergency department.  Blood sugar was high.  Blood pressure was high.  Cardiac testing has been unremarkable.  She feels well this morning.  She reports being ambulatory in the room without chest discomfort or dyspnea.    Medications  Current Facility-Administered Medications   Medication Dose Route Frequency Provider Last Rate Last Admin    acetaminophen tablet 650 mg  650 mg Oral Q8H PRN Yoli Lombardi PA-C        allopurinoL tablet 300 mg  300 mg Oral Daily Yoli Lombardi PA-ELISHA        aspirin EC tablet 81 mg  81 mg Oral Daily Yoli Lombardi PA-ELISHA        capsaicin-skin cleanser patch 1 patch  1 patch Topical (Top) 1 time in Clinic/HOD Elvira Hylton NP        cloNIDine tablet 0.1 mg  0.1 mg Oral BID Yoli Lombardi PA-C   0.1 mg at 05/22/24 2107    dextrose 10% bolus 125 mL 125 mL  12.5 g Intravenous PRN Yoli Lombardi PA-ELISHA        dextrose 10% bolus 250 mL 250 mL  25 g Intravenous PRN Yoli Lombardi PA-C        furosemide tablet 40 mg  40 mg Oral Daily Yoli Lombardi PA-ELISHA        glucagon (human recombinant) injection 1 mg  1 mg Intramuscular PRN Yoli Lombardi PA-C        glucose chewable tablet 16 g  16 g Oral PRN Yoli Lombardi PA-C        glucose chewable tablet 24 g  24 g Oral PRN Yoli Lombardi PA-ELISHA        HYDROcodone-acetaminophen  mg per tablet 1 tablet  1 tablet Oral Q8H PRN  Yoli Lombardi PA-C   1 tablet at 05/22/24 2118    insulin aspart U-100 pen 0-5 Units  0-5 Units Subcutaneous QID (AC + HS) PRN Yoli Lombardi PA-C   3 Units at 05/23/24 0014    insulin detemir U-100 (Levemir) pen 18 Units  18 Units Subcutaneous QHS Yoli Lombardi PA-C   18 Units at 05/22/24 2108    losartan tablet 50 mg  50 mg Oral Daily Yoli Lombardi PA-C        melatonin tablet 6 mg  6 mg Oral Nightly PRN Yoli Lombardi PA-C        methocarbamoL tablet 500 mg  500 mg Oral TID PRN Yoli Lombardi PA-C   500 mg at 05/22/24 2119    metoclopramide HCl tablet 5 mg  5 mg Oral TID AC Yoli Lombardi PA-C   5 mg at 05/23/24 0722    metoprolol succinate (TOPROL-XL) 24 hr tablet 100 mg  100 mg Oral Daily Yoli Lombardi PA-C        NIFEdipine 24 hr tablet 30 mg  30 mg Oral BID Yoli Lombardi PA-C   30 mg at 05/22/24 2107    nitroGLYCERIN SL tablet 0.4 mg  0.4 mg Sublingual Q5 Min PRN Yoli Lombardi PA-ELISHA        ondansetron disintegrating tablet 8 mg  8 mg Oral Q8H PRN Yoli Lombardi PA-C        pregabalin capsule 150 mg  150 mg Oral BID Yoli Lombardi PA-C   150 mg at 05/22/24 2107    senna-docusate 8.6-50 mg per tablet 1 tablet  1 tablet Oral BID Yoli Lombardi PA-ELISHA   1 tablet at 05/22/24 2107    sertraline tablet 100 mg  100 mg Oral QHS Yoli Lombardi PA-C   100 mg at 05/22/24 2119    sodium chloride 0.9% flush 10 mL  10 mL Intravenous PRN Yoli Lombardi PA-C         Current Outpatient Medications   Medication Sig Dispense Refill    allopurinoL (ZYLOPRIM) 300 MG tablet TAKE ONE TABLET BY MOUTH EVERY DAY 90 tablet 1    aspirin (ECOTRIN) 81 MG EC tablet Take 1 tablet (81 mg total) by mouth once daily. 30 tablet 0    cloNIDine (CATAPRES) 0.1 MG tablet Take 1 tablet (0.1 mg total) by mouth 2 (two) times daily. 180 tablet 3    fenofibrate micronized (LOFIBRA) 134 MG Cap Take 1 capsule (134 mg total) by mouth daily with breakfast. 90 capsule  3    furosemide (LASIX) 40 MG tablet TAKE ONE TABLET BY MOUTH EVERY DAY 90 tablet 1    HYDROcodone-acetaminophen (NORCO)  mg per tablet Take 1 tablet by mouth every 8 (eight) hours as needed for Pain. 90 tablet 0    insulin aspart U-100 (NOVOLOG FLEXPEN U-100 INSULIN) 100 unit/mL (3 mL) InPn pen Inject 14 Units into the skin 3 (three) times daily with meals. + meal correction scale. Max 40 units 30 mL 3    insulin detemir U-100, Levemir, (LEVEMIR FLEXTOUCH U100 INSULIN) 100 unit/mL (3 mL) InPn pen Inject 20 Units into the skin once daily AND 18 Units every evening. 30 mL 3    ipratropium-albuteroL (COMBIVENT)  mcg/actuation inhaler Inhale 1 puff into the lungs by mouth every 6 (six) hours. 4 g 0    losartan (COZAAR) 50 MG tablet Take 1 tablet (50 mg total) by mouth once daily. 90 tablet 3    methocarbamoL (ROBAXIN) 500 MG Tab TAKE ONE TABLET BY MOUTH 3 TIMES DAILY AS NEEDED FOR MUSCLE SPASMS 90 tablet 2    metoclopramide HCl (REGLAN) 5 MG tablet TAKE ONE TABLET BY MOUTH FOUR TIMES DAILY AS NEEDED FOR NAUSEA PREVENTION 30 tablet 3    metoprolol succinate (TOPROL-XL) 100 MG 24 hr tablet Take 1 tablet (100 mg total) by mouth once daily. 90 tablet 3    NIFEdipine (PROCARDIA-XL) 30 MG (OSM) 24 hr tablet Take 1 tablet (30 mg total) by mouth 2 (two) times a day. 180 tablet 3    nitroGLYCERIN (NITROSTAT) 0.4 MG SL tablet Place 1 tablet (0.4 mg total) under the tongue every 5 (five) minutes as needed for Chest pain. 25 tablet 11    pioglitazone (ACTOS) 15 MG tablet Take 15 mg by mouth once daily.      pregabalin (LYRICA) 150 MG capsule Take 1 capsule (150 mg total) by mouth 4 (four) times daily. TAKE ONE CAPSULE BY MOUTH 3 TIMES DAILY 120 capsule 2    senna-docusate 8.6-50 mg (PERICOLACE) 8.6-50 mg per tablet Take 1 tablet by mouth 2 (two) times daily as needed for Constipation. 60 tablet 0    sertraline (ZOLOFT) 100 MG tablet Take 1 tablet (100 mg total) by mouth once daily. 90 tablet 3    acetaminophen  "(TYLENOL) 500 MG tablet Take 2 tablets (1,000 mg total) by mouth every 8 (eight) hours as needed for Pain. (Patient not taking: Reported on 5/15/2024)  0    atorvastatin (LIPITOR) 40 MG tablet Take 40 mg by mouth every evening. (Patient not taking: Reported on 5/15/2024)      blood sugar diagnostic Strp To check BG 6 times daily, to use with insurance preferred meter 200 each 11    blood-glucose meter kit To check BG 6 times daily, to use with insurance preferred meter 1 each 0    blood-glucose meter,continuous (DEXCOM G7 ) Misc Use as directed. 1 each 0    blood-glucose sensor (DEXCOM G7 SENSOR) Nicole Change every 10 days. 3 each 11    dicyclomine (BENTYL) 10 MG capsule Take 10 mg by mouth 3 (three) times daily.      evolocumab (REPATHA SURECLICK) 140 mg/mL PnIj Inject 1 mL (140 mg total) into the skin every 14 (fourteen) days. 2 each 11    fluticasone propionate (FLONASE) 50 mcg/actuation nasal spray Inhale 1 spray every day by intranasal route as needed.      hydrocortisone (ANUSOL-HC) 2.5 % rectal cream Procto-Med HC 2.5 % topical cream perineal applicator      lancets Misc To check BG 6 times daily, to use with insurance preferred meter 200 each 11    mupirocin (BACTROBAN) 2 % ointment Apply topically 3 (three) times daily as needed (skin breakdown). 22 g 3    pantoprazole (PROTONIX) 40 MG tablet Take 1 tablet (40 mg total) by mouth once daily. (Patient not taking: Reported on 5/15/2024) 30 tablet 0    pen needle, diabetic 31 gauge x 1/4" Ndle 1 each by Misc.(Non-Drug; Combo Route) route 5 (five) times daily. 500 each 3    teriparatide 20 mcg/dose (620mcg/2.48mL) PnIj Inject 20 mcg into the skin once daily. 2.48 mL 5    triamcinolone acetonide 0.1% (KENALOG) 0.1 % cream Apply to affected areas of body twice daily as needed rash. Do not use on face, underarms, or groin. 454 g 0      Prior to Admission medications    Medication Sig Start Date End Date Taking? Authorizing Provider   allopurinoL (ZYLOPRIM) " 300 MG tablet TAKE ONE TABLET BY MOUTH EVERY DAY 1/22/24  Yes Anthony New NP   aspirin (ECOTRIN) 81 MG EC tablet Take 1 tablet (81 mg total) by mouth once daily. 9/10/21  Yes Prasanna Montes MD   cloNIDine (CATAPRES) 0.1 MG tablet Take 1 tablet (0.1 mg total) by mouth 2 (two) times daily. 2/27/24  Yes Chun Zapata MD   fenofibrate micronized (LOFIBRA) 134 MG Cap Take 1 capsule (134 mg total) by mouth daily with breakfast. 5/6/24 5/6/25 Yes Chun Zapata MD   furosemide (LASIX) 40 MG tablet TAKE ONE TABLET BY MOUTH EVERY DAY 3/12/24  Yes Chun Zapata MD   HYDROcodone-acetaminophen (NORCO)  mg per tablet Take 1 tablet by mouth every 8 (eight) hours as needed for Pain. 5/15/24 6/14/24 Yes Holden Pereira MD   insulin aspart U-100 (NOVOLOG FLEXPEN U-100 INSULIN) 100 unit/mL (3 mL) InPn pen Inject 14 Units into the skin 3 (three) times daily with meals. + meal correction scale. Max 40 units 11/13/23  Yes Giuliana Montero NP   insulin detemir U-100, Levemir, (LEVEMIR FLEXTOUCH U100 INSULIN) 100 unit/mL (3 mL) InPn pen Inject 20 Units into the skin once daily AND 18 Units every evening. 11/13/23 11/12/24 Yes Giuliana Montero NP   ipratropium-albuteroL (COMBIVENT)  mcg/actuation inhaler Inhale 1 puff into the lungs by mouth every 6 (six) hours. 5/20/24  Yes Chun Zapata MD   losartan (COZAAR) 50 MG tablet Take 1 tablet (50 mg total) by mouth once daily. 2/27/24 2/26/25 Yes Chun Zapata MD   methocarbamoL (ROBAXIN) 500 MG Tab TAKE ONE TABLET BY MOUTH 3 TIMES DAILY AS NEEDED FOR MUSCLE SPASMS 4/16/24  Yes Orgeron, Elvira M., NP   metoclopramide HCl (REGLAN) 5 MG tablet TAKE ONE TABLET BY MOUTH FOUR TIMES DAILY AS NEEDED FOR NAUSEA PREVENTION 5/6/24  Yes Chun Zapata MD   metoprolol succinate (TOPROL-XL) 100 MG 24 hr tablet Take 1 tablet (100 mg total) by mouth once daily. 8/9/23  Yes Chun Zapata MD   NIFEdipine (PROCARDIA-XL) 30 MG (OSM)  24 hr tablet Take 1 tablet (30 mg total) by mouth 2 (two) times a day. 2/27/24 2/26/25 Yes Chun Zapata MD   nitroGLYCERIN (NITROSTAT) 0.4 MG SL tablet Place 1 tablet (0.4 mg total) under the tongue every 5 (five) minutes as needed for Chest pain. 9/21/23  Yes Jose L Méndez MD   pioglitazone (ACTOS) 15 MG tablet Take 15 mg by mouth once daily.   Yes Provider, Historical   pregabalin (LYRICA) 150 MG capsule Take 1 capsule (150 mg total) by mouth 4 (four) times daily. TAKE ONE CAPSULE BY MOUTH 3 TIMES DAILY 3/14/24 6/12/24 Yes Holden Pereira MD   senna-docusate 8.6-50 mg (PERICOLACE) 8.6-50 mg per tablet Take 1 tablet by mouth 2 (two) times daily as needed for Constipation. 9/10/21  Yes Prasanna Montes MD   sertraline (ZOLOFT) 100 MG tablet Take 1 tablet (100 mg total) by mouth once daily. 8/9/23  Yes Chun Zapata MD   acetaminophen (TYLENOL) 500 MG tablet Take 2 tablets (1,000 mg total) by mouth every 8 (eight) hours as needed for Pain.  Patient not taking: Reported on 5/15/2024 9/10/21   Prasanna Montes MD   atorvastatin (LIPITOR) 40 MG tablet Take 40 mg by mouth every evening.  Patient not taking: Reported on 5/15/2024    Provider, Historical   blood sugar diagnostic Strp To check BG 6 times daily, to use with insurance preferred meter 9/29/23   Giuliana Montero NP   blood-glucose meter kit To check BG 6 times daily, to use with insurance preferred meter 9/29/23   Giuliana Montero NP   blood-glucose meter,continuous (DEXCOM G7 ) Misc Use as directed. 9/29/23   Giuliana Montero NP   blood-glucose sensor (DEXCOM G7 SENSOR) Nicole Change every 10 days. 2/29/24   Giuliana Montero NP   dicyclomine (BENTYL) 10 MG capsule Take 10 mg by mouth 3 (three) times daily.    Provider, Historical   evolocumab (REPATHA SURECLICK) 140 mg/mL PnBird Inject 1 mL (140 mg total) into the skin every 14 (fourteen) days. 2/5/24   Jose L Méndez MD   fluticasone propionate (FLONASE) 50 mcg/actuation nasal  "spray Inhale 1 spray every day by intranasal route as needed.    Provider, Historical   hydrocortisone (ANUSOL-HC) 2.5 % rectal cream Procto-Med HC 2.5 % topical cream perineal applicator    Provider, Historical   lancets Misc To check BG 6 times daily, to use with insurance preferred meter 23   Giuliana Montero NP   mupirocin (BACTROBAN) 2 % ointment Apply topically 3 (three) times daily as needed (skin breakdown). 24   Chun Zapata MD   pantoprazole (PROTONIX) 40 MG tablet Take 1 tablet (40 mg total) by mouth once daily.  Patient not taking: Reported on 5/15/2024 9/11/21   Prasanna Montes MD   pen needle, diabetic 31 gauge x 1/4" Ndle 1 each by Misc.(Non-Drug; Combo Route) route 5 (five) times daily. 23   Giuliana Montero NP   teriparatide 20 mcg/dose (620mcg/2.48mL) PnIj Inject 20 mcg into the skin once daily. 24   Giuliana Montero NP   triamcinolone acetonide 0.1% (KENALOG) 0.1 % cream Apply to affected areas of body twice daily as needed rash. Do not use on face, underarms, or groin. 22   Connie Ernandez MD   blood-glucose transmitter (DEXCOM G6 TRANSMITTER) Nicole 1 each by Misc.(Non-Drug; Combo Route) route continuous prn. 10/29/21 9/29/23  Giuliana Montero NP       History  Past Medical History:   Diagnosis Date    Arthritis     Back pain     Cancer     ovarian    Cervical cancer     Coronary artery disease     Depression     Diabetes mellitus     Fibromyalgia     Heart attack     History of MI (myocardial infarction)     Hyperlipidemia     Hypertension     Lupus     Stroke     slight left sided weakness     Past Surgical History:   Procedure Laterality Date    APPENDECTOMY       SECTION      2    CORONARY ANGIOGRAPHY N/A 2022    Procedure: ANGIOGRAM, CORONARY ARTERY;  Surgeon: Jose L Méndez MD;  Location: [facility] CATH LAB;  Service: Cardiology;  Laterality: N/A;    HYSTERECTOMY      with USO for cervical cancer    INJECTION OF ANESTHETIC AGENT AROUND NERVE " Bilateral 05/05/2021    Procedure: BLOCK, NERVE, SYMPATHIC;  Surgeon: Holden Pereira MD;  Location: [facility] PAIN MGT;  Service: Pain Management;  Laterality: Bilateral;    INJECTION OF ANESTHETIC AGENT AROUND NERVE N/A 08/25/2021    Procedure: BLOCK, NERVE, SYMPATHETIC  need consent;  Surgeon: Holden Pereira MD;  Location: [facility] PAIN MGT;  Service: Pain Management;  Laterality: N/A;    CA FARIBA,SWALLOW FUNCTION,CINE/VIDEO RECORD  09/09/2021         TONSILLECTOMY      TRIAL OF SPINAL CORD NERVE STIMULATOR N/A 3/8/2023    Procedure: LUMBAR SPINAL CORD STIMULATOR TRIAL NEVRO REP PATIENT STATES SHE NO LONGER TAKES PLAVIX;  Surgeon: Holden Pereira MD;  Location: [facility] PAIN MGT;  Service: Pain Management;  Laterality: N/A;     Social History     Socioeconomic History    Marital status:    Tobacco Use    Smoking status: Former     Current packs/day: 0.00     Types: Cigarettes     Quit date: 11/2020     Years since quitting: 3.5     Passive exposure: Past    Smokeless tobacco: Never   Substance and Sexual Activity    Alcohol use: Not Currently     Comment: occasionally    Drug use: Yes     Types: Hydrocodone     Comment: three times a day    Sexual activity: Not Currently   Social History Narrative    Lives with daughter. .      Social Determinants of Health     Financial Resource Strain: Low Risk  (5/22/2024)    Overall Financial Resource Strain (CARDIA)     Difficulty of Paying Living Expenses: Not very hard   Food Insecurity: No Food Insecurity (5/22/2024)    Hunger Vital Sign     Worried About Running Out of Food in the Last Year: Never true     Ran Out of Food in the Last Year: Never true   Transportation Needs: No Transportation Needs (2/27/2024)    PRAPARE - Transportation     Lack of Transportation (Medical): No     Lack of Transportation (Non-Medical): No   Physical Activity: Insufficiently Active (5/22/2024)    Exercise Vital Sign     Days of Exercise per Week: 4 days     Minutes of Exercise  per Session: 10 min   Stress: No Stress Concern Present (5/22/2024)    [employer] - Occupational Stress Questionnaire     Feeling of Stress : Only a little   Housing Stability: Unknown (2/27/2024)    Housing Stability Vital Sign     Unable to Pay for Housing in the Last Year: No     Unstable Housing in the Last Year: No     Family History   Problem Relation Name Age of Onset    COPD Mother      Lupus Mother      Hernia Mother      Uterine cancer Mother          vs cervical cancer    Ovarian cancer Mother      Diabetes Father      Coronary artery disease Father      Colon cancer Maternal Grandmother          in her 50's        Allergies  Review of patient's allergies indicates:   Allergen Reactions    Bleach (sodium hypochlorite) Shortness Of Breath    Nitrofurantoin macrocrystalline Anaphylaxis    Lipitor [atorvastatin] Diarrhea and Rash    Nsaids (non-steroidal anti-inflammatory drug)      Tolerates aspirin      Pcn [penicillins]     Toradol [ketorolac]        Review of Systems   Review of Systems   Constitutional: Negative for chills, fever and malaise/fatigue.   HENT:  Negative for nosebleeds.    Eyes:  Negative for blurred vision, double vision, vision loss in left eye and vision loss in right eye.   Cardiovascular:  Positive for chest pain and dyspnea on exertion. Negative for claudication, irregular heartbeat, leg swelling, near-syncope, orthopnea, palpitations, paroxysmal nocturnal dyspnea and syncope.   Respiratory:  Negative for cough, hemoptysis, shortness of breath and wheezing.    Endocrine: Negative for cold intolerance and heat intolerance.   Hematologic/Lymphatic: Negative for bleeding problem. Does not bruise/bleed easily.   Skin:  Negative for color change.   Musculoskeletal:  Negative for falls, muscle weakness and myalgias.   Gastrointestinal:  Negative for abdominal pain, heartburn, hematemesis, hematochezia, hemorrhoids, jaundice, melena, nausea and vomiting.    Genitourinary:  Negative for dysuria, hematuria and nocturia.   Neurological:  Negative for dizziness, focal weakness, headaches, light-headedness, loss of balance, numbness, vertigo and weakness.   Psychiatric/Behavioral:  Negative for altered mental status, depression and memory loss. The patient is not nervous/anxious.    Allergic/Immunologic: Negative for hives and persistent infections.       Physical Exam    Temp:  [98 °F (36.7 °C)]   Pulse:  [79-86]   Resp:  [16-17]   BP: (158-173)/(68-72)   SpO2:  [96 %-98 %]    Wt Readings from Last 1 Encounters:   05/22/24 89.8 kg (198 lb)     Physical Exam  Vitals and nursing note reviewed.   Constitutional:       General: She is not in acute distress.     Appearance: She is obese. She is not toxic-appearing or diaphoretic.   HENT:      Head: Normocephalic and atraumatic.      Mouth/Throat:      Lips: Pink.      Mouth: Mucous membranes are moist.   Eyes:      General: No scleral icterus.     Conjunctiva/sclera: Conjunctivae normal.   Neck:      Thyroid: No thyromegaly.      Vascular: No carotid bruit, hepatojugular reflux or JVD.      Trachea: Trachea normal.   Cardiovascular:      Rate and Rhythm: Normal rate and regular rhythm. No extrasystoles are present.     Chest Wall: PMI is not displaced.      Pulses:           Carotid pulses are 2+ on the right side and 2+ on the left side.       Radial pulses are 2+ on the right side and 2+ on the left side.      Heart sounds: S1 normal and S2 normal. No murmur heard.     No friction rub. No S3 or S4 sounds.   Pulmonary:      Effort: Pulmonary effort is normal. No accessory muscle usage or respiratory distress.      Breath sounds: Normal breath sounds and air entry. No decreased breath sounds, wheezing, rhonchi or rales.   Abdominal:      General: Bowel sounds are normal. There is no distension or abdominal bruit.      Palpations: Abdomen is soft. There is no hepatomegaly, splenomegaly or pulsatile mass.      Tenderness: There  is no abdominal tenderness.   Musculoskeletal:         General: No tenderness or deformity.      Right lower leg: No edema.      Left lower leg: No edema.   Skin:     General: Skin is warm and dry.      Capillary Refill: Capillary refill takes less than 2 seconds.      Coloration: Skin is not cyanotic or pale.      Nails: There is no clubbing.   Neurological:      General: No focal deficit present.      Mental Status: She is alert and oriented to person, place, and time.   Psychiatric:         Attention and Perception: Attention normal.         Mood and Affect: Mood normal.         Speech: Speech normal.         Behavior: Behavior normal. Behavior is cooperative.         Telemetry  Sinus rhythm with occasional, isolated ventricular ectopy    EKG  Sinus rhythm with nonspecific ST abnormality.  There is no change from prior electrocardiograms.  There have been no serial changes since presentation.    Echocardiogram 12/16/2023    Left Ventricle: The left ventricle is normal in size. Mildly increased wall thickness. Unable to assess wall motion. There is low normal systolic function with a visually estimated ejection fraction of 50 - 55%. There is indeterminate diastolic function.    Left Atrium: Left atrium is mildly dilated.    Aortic Valve: There is aortic valve sclerosis without clinically significant stenosis.    Mitral Valve: Mildly calcified anterior leaflet. There is mild posterior mitral annular calcification present. There is mild stenosis. The mean pressure gradient across the mitral valve is 3.6 mmHg at a heart rate of 79 bpm. There is trace to mild regurgitation.    Right Ventricle: Normal right ventricular cavity size. Systolic function is normal.    Technically difficult study with poor endocardial border visualization.    Labs  Recent Results (from the past 72 hour(s))   POCT glucose    Collection Time: 05/22/24  1:59 PM   Result Value Ref Range    POCT Glucose 334 (H) 70 - 110 mg/dL   CBC auto  differential    Collection Time: 05/22/24  2:58 PM   Result Value Ref Range    WBC 11.55 3.90 - 12.70 K/uL    RBC 3.69 (L) 4.00 - 5.40 M/uL    Hemoglobin 10.2 (L) 12.0 - 16.0 g/dL    Hematocrit 32.4 (L) 37.0 - 48.5 %    MCV 88 82 - 98 fL    MCH 27.6 27.0 - 31.0 pg    MCHC 31.5 (L) 32.0 - 36.0 g/dL    RDW 17.1 (H) 11.5 - 14.5 %    Platelets 263 150 - 450 K/uL    MPV 11.1 9.2 - 12.9 fL    Immature Granulocytes 0.7 (H) 0.0 - 0.5 %    Gran # (ANC) 7.8 (H) 1.8 - 7.7 K/uL    Immature Grans (Abs) 0.08 (H) 0.00 - 0.04 K/uL    Lymph # 2.3 1.0 - 4.8 K/uL    Mono # 0.7 0.3 - 1.0 K/uL    Eos # 0.5 0.0 - 0.5 K/uL    Baso # 0.06 0.00 - 0.20 K/uL    nRBC 0 0 /100 WBC    Gran % 67.9 38.0 - 73.0 %    Lymph % 20.3 18.0 - 48.0 %    Mono % 6.3 4.0 - 15.0 %    Eosinophil % 4.3 0.0 - 8.0 %    Basophil % 0.5 0.0 - 1.9 %    Differential Method Automated    Brain natriuretic peptide    Collection Time: 05/22/24  2:58 PM   Result Value Ref Range     (H) 0 - 99 pg/mL   Comprehensive metabolic panel    Collection Time: 05/22/24  2:58 PM   Result Value Ref Range    Sodium 137 136 - 145 mmol/L    Potassium 4.2 3.5 - 5.1 mmol/L    Chloride 101 95 - 110 mmol/L    CO2 21 (L) 23 - 29 mmol/L    Glucose 387 (H) 70 - 110 mg/dL    BUN 21 8 - 23 mg/dL    Creatinine 1.5 (H) 0.5 - 1.4 mg/dL    Calcium 9.2 8.7 - 10.5 mg/dL    Total Protein 6.8 6.0 - 8.4 g/dL    Albumin 3.4 (L) 3.5 - 5.2 g/dL    Total Bilirubin 0.1 0.1 - 1.0 mg/dL    Alkaline Phosphatase 56 55 - 135 U/L    AST 15 10 - 40 U/L    ALT 16 10 - 44 U/L    eGFR 35 (A) >60 mL/min/1.73 m^2    Anion Gap 15 8 - 16 mmol/L   Troponin I    Collection Time: 05/22/24  2:58 PM   Result Value Ref Range    Troponin I <0.006 0.000 - 0.026 ng/mL   Beta - Hydroxybutyrate, Serum    Collection Time: 05/22/24  2:58 PM   Result Value Ref Range    Beta-Hydroxybutyrate 0.1 0.0 - 0.5 mmol/L   POCT glucose    Collection Time: 05/22/24  3:05 PM   Result Value Ref Range    POCT Glucose 382 (H) 70 - 110 mg/dL   ISTAT  PROCEDURE    Collection Time: 05/22/24  3:30 PM   Result Value Ref Range    POC PH 7.344 (L) 7.35 - 7.45    POC PCO2 48.0 (H) 35 - 45 mmHg    POC PO2 102 (H) 80 - 100 mmHg    POC HCO3 26.1 24 - 28 mmol/L    POC BE 0 -2 to 2 mmol/L    POC SATURATED O2 97 95 - 100 %    POC TCO2 28 (H) 23 - 27 mmol/L    POC Hematocrit 31 (L) 36 - 54 %PCV    POC HEMOGLOBIN 11 g/dL    Sample ARTERIAL     FiO2 28    POCT glucose    Collection Time: 05/22/24  5:23 PM   Result Value Ref Range    POCT Glucose 256 (H) 70 - 110 mg/dL   POCT glucose    Collection Time: 05/22/24  7:17 PM   Result Value Ref Range    POCT Glucose 255 (H) 70 - 110 mg/dL   Troponin I    Collection Time: 05/22/24  7:23 PM   Result Value Ref Range    Troponin I 0.017 0.000 - 0.026 ng/mL   Troponin I    Collection Time: 05/22/24 11:51 PM   Result Value Ref Range    Troponin I 0.012 0.000 - 0.026 ng/mL   POCT glucose    Collection Time: 05/23/24 12:02 AM   Result Value Ref Range    POCT Glucose 377 (H) 70 - 110 mg/dL   CBC auto differential    Collection Time: 05/23/24  5:46 AM   Result Value Ref Range    WBC 9.98 3.90 - 12.70 K/uL    RBC 3.63 (L) 4.00 - 5.40 M/uL    Hemoglobin 10.0 (L) 12.0 - 16.0 g/dL    Hematocrit 31.9 (L) 37.0 - 48.5 %    MCV 88 82 - 98 fL    MCH 27.5 27.0 - 31.0 pg    MCHC 31.3 (L) 32.0 - 36.0 g/dL    RDW 16.8 (H) 11.5 - 14.5 %    Platelets 237 150 - 450 K/uL    MPV 11.4 9.2 - 12.9 fL    Immature Granulocytes 0.7 (H) 0.0 - 0.5 %    Gran # (ANC) 5.5 1.8 - 7.7 K/uL    Immature Grans (Abs) 0.07 (H) 0.00 - 0.04 K/uL    Lymph # 3.2 1.0 - 4.8 K/uL    Mono # 0.7 0.3 - 1.0 K/uL    Eos # 0.5 0.0 - 0.5 K/uL    Baso # 0.07 0.00 - 0.20 K/uL    nRBC 0 0 /100 WBC    Gran % 54.7 38.0 - 73.0 %    Lymph % 31.7 18.0 - 48.0 %    Mono % 6.9 4.0 - 15.0 %    Eosinophil % 5.3 0.0 - 8.0 %    Basophil % 0.7 0.0 - 1.9 %    Differential Method Automated    Comprehensive metabolic panel    Collection Time: 05/23/24  5:46 AM   Result Value Ref Range    Sodium 138 136 - 145  mmol/L    Potassium 4.5 3.5 - 5.1 mmol/L    Chloride 104 95 - 110 mmol/L    CO2 25 23 - 29 mmol/L    Glucose 242 (H) 70 - 110 mg/dL    BUN 19 8 - 23 mg/dL    Creatinine 1.2 0.5 - 1.4 mg/dL    Calcium 9.0 8.7 - 10.5 mg/dL    Total Protein 6.4 6.0 - 8.4 g/dL    Albumin 3.2 (L) 3.5 - 5.2 g/dL    Total Bilirubin 0.2 0.1 - 1.0 mg/dL    Alkaline Phosphatase 50 (L) 55 - 135 U/L    AST 15 10 - 40 U/L    ALT 14 10 - 44 U/L    eGFR 46 (A) >60 mL/min/1.73 m^2    Anion Gap 9 8 - 16 mmol/L   POCT glucose    Collection Time: 05/23/24  7:27 AM   Result Value Ref Range    POCT Glucose 275 (H) 70 - 110 mg/dL        Imaging  X-Ray Chest 1 View    Result Date: 5/22/2024  EXAMINATION: XR CHEST 1 VIEW CLINICAL HISTORY: Shortness of breath TECHNIQUE: Single frontal view of the chest was performed. COMPARISON: 12/15/2023 FINDINGS: Lungs are well expanded.  No acute consolidation, pleural effusion, or pneumothorax. Cardiac silhouette is normal in size.  Calcified plaque lines arch.     Stable chest with no acute cardiopulmonary process seen. Electronically signed by: Karon Pennington Date:    05/22/2024 Time:    14:44      Assessment    Coronary atherosclerosis with stable angina  She has small-vessel disease which is not amenable to revascularization.  EKG is without acute ischemic changes.  Cardiac enzymes are normal.    Hypertension   Generally well controlled with blood pressure was high on presentation    Hyperglycemia   Diabetes is generally not well controlled.    Plan and Discussion    Can intensify her medical management for angina.  Increase metoprolol to 100 mg twice daily.  Continue her other cardiac medications.  No need for additional cardiac testing.  Management of hyperglycemia per EDOU.  She should follow-up with me in 6 weeks.      Jose L Méndez MD\

## 2024-05-23 NOTE — ED NOTES
Resumed pt care. 79 YOF presents to ED with c/o intermitting cp and SOB that began today. PMH CHF. Currently denies any complaints. O2 99% on RA. . Cardiac monitor and Purewick noted in place. Family at bedside. Dx. Hyperglycemia, CP, RANJEET and SOB. Pending Cardiology consult in am. A&Ox4. Denies any other complaints.     LOC: The patient is awake, alert and aware of environment with an appropriate affect, the patient is oriented x 3.  APPEARANCE: Patient resting comfortably and in no acute distress, patient is clean and well groomed, patient's clothing is properly fastened.  SKIN: The skin is warm and dry, patient has normal skin turgor and moist mucus membranes, skin intact, no breakdown or brusing noted.  MUSKULOSKELETAL: Patient moving all extremities well, no obvious swelling or deformities noted.  RESPIRATORY: Airway is open and patent, respirations are spontaneous, patient has a normal effort and rate.  CARDIAC: Peripheral edema noted.  ABDOMEN: Soft and no tenderness to palpation, no distention noted.     ED workup in progress. VSS. Safety measures in place; side rails up x2. Call light within pt reach. Will continue to monitor.

## 2024-05-23 NOTE — DISCHARGE SUMMARY
"[facility] ED Observation Unit  Discharge Summary        History of Present Illness:    Indira Waters is a 79 y.o. female, with a PMHx of diabetes, high cholesterol, and low blood pressure who presents to the ED with SOB and chest pain since this morning. She notes upon waking up she experiences SOB and about an hour later she experienced chest pain. Patient took nitro for her symptoms prior to arrival which resolved her symptoms. At this time, patient does not have any complaints of chest pain. She also notes of feeling "shaky." The patient reports that her sugar was high this morning. The patient reports she is currently on Asprin but denies blood thinners. She denies DVT. She reports of being "nauseated." She denies fever and chills. This is the extent of the patient's complaints today in the Emergency Department.       ED Course:  - CXR with stable chest, no acute cardiopulmonary process  - EKG sinus tachycardia with occasional PVCs, no ischemia  - initial troponin normal, BNP mildly elevated at 134  - CBC with stable anemia  - CMP with glucose 387.  Mild RANJEET with creatinine 1.5  - Remains without chest pain     I reviewed the ED Provider Note dated 05/22/2024 prior to my evaluation of this patient.  I reviewed all labs and imaging performed in the Main ED, prior to patient being placed in Observation. Patient was placed in the ED Observation Unit for hyperglycemia.     Observation Course:    HAILEY, afebrile.  Patient remained without chest pain or symptoms.  Dr. Méndez, who is the patient's cardiologist, evaluated and recommends increasing metoprolol to 100 mg twice daily and follow-up outpatient in 6 weeks.  Improved renal function after fluids, now at baseline with creatinine of 1.2.  Patient remained hyperglycemic despite insulin.     Brief Physical Exam/Reassessment   Review of Systems   Constitutional:  Negative for chills and fever.   HENT:  Negative for congestion, nosebleeds and sore throat.  "   Eyes:  Negative for blurred vision, double vision and photophobia.   Respiratory:  Negative for cough and shortness of breath.    Cardiovascular:  Positive for chest pain (Resolved). Negative for claudication and leg swelling.   Gastrointestinal:  Negative for diarrhea, nausea and vomiting.   Genitourinary:  Negative for dysuria and urgency.   Musculoskeletal:  Negative for back pain and neck pain.   Skin:  Negative for itching and rash.   Neurological:  Negative for dizziness, weakness and headaches.       Physical Exam    Constitutional: She appears well-developed and well-nourished.  Non-toxic appearance. She does not appear ill. No distress.   HENT:   Head: Normocephalic and atraumatic.   Eyes: Conjunctivae are normal.   Neck: Neck supple.   Cardiovascular:  Normal rate and regular rhythm.           Pulmonary/Chest: Effort normal. No tachypnea. No respiratory distress.   Musculoskeletal:      Cervical back: Neck supple.     Neurological: She is alert and oriented to person, place, and time.   Skin: Skin is warm, dry and intact.   Psychiatric: She has a normal mood and affect. Her speech is normal and behavior is normal.         Consultants:    Cardiology, Dr. Méndez    ED/OBS Workup:  Vitals:    05/23/24 0322 05/23/24 0404 05/23/24 0829 05/23/24 0917   BP:  (!) 173/72 (!) 196/74    Pulse: 79 85 86    Resp:  16 18 15   Temp:  98 °F (36.7 °C) 97.5 °F (36.4 °C)    TempSrc:  Oral Oral    SpO2:  98% 96%    Weight:       Height:        05/23/24 1103   BP:    Pulse: 76   Resp:    Temp:    TempSrc:    SpO2:    Weight:    Height:        Labs Reviewed   CBC W/ AUTO DIFFERENTIAL - Abnormal; Notable for the following components:       Result Value    RBC 3.69 (*)     Hemoglobin 10.2 (*)     Hematocrit 32.4 (*)     MCHC 31.5 (*)     RDW 17.1 (*)     Immature Granulocytes 0.7 (*)     Gran # (ANC) 7.8 (*)     Immature Grans (Abs) 0.08 (*)     All other components within normal limits   B-TYPE NATRIURETIC PEPTIDE - Abnormal;  Notable for the following components:     (*)     All other components within normal limits   COMPREHENSIVE METABOLIC PANEL - Abnormal; Notable for the following components:    CO2 21 (*)     Glucose 387 (*)     Creatinine 1.5 (*)     Albumin 3.4 (*)     eGFR 35 (*)     All other components within normal limits   CBC W/ AUTO DIFFERENTIAL - Abnormal; Notable for the following components:    RBC 3.63 (*)     Hemoglobin 10.0 (*)     Hematocrit 31.9 (*)     MCHC 31.3 (*)     RDW 16.8 (*)     Immature Granulocytes 0.7 (*)     Immature Grans (Abs) 0.07 (*)     All other components within normal limits   COMPREHENSIVE METABOLIC PANEL - Abnormal; Notable for the following components:    Glucose 242 (*)     Albumin 3.2 (*)     Alkaline Phosphatase 50 (*)     eGFR 46 (*)     All other components within normal limits   POCT GLUCOSE - Abnormal; Notable for the following components:    POCT Glucose 334 (*)     All other components within normal limits   POCT GLUCOSE - Abnormal; Notable for the following components:    POCT Glucose 382 (*)     All other components within normal limits   ISTAT PROCEDURE - Abnormal; Notable for the following components:    POC PH 7.344 (*)     POC PCO2 48.0 (*)     POC PO2 102 (*)     POC TCO2 28 (*)     POC Hematocrit 31 (*)     All other components within normal limits   POCT GLUCOSE - Abnormal; Notable for the following components:    POCT Glucose 256 (*)     All other components within normal limits   POCT GLUCOSE - Abnormal; Notable for the following components:    POCT Glucose 255 (*)     All other components within normal limits   POCT GLUCOSE - Abnormal; Notable for the following components:    POCT Glucose 377 (*)     All other components within normal limits   POCT GLUCOSE - Abnormal; Notable for the following components:    POCT Glucose 275 (*)     All other components within normal limits   TROPONIN I   BETA - HYDROXYBUTYRATE, SERUM   TROPONIN I   TROPONIN I   POCT GLUCOSE  MONITORING CONTINUOUS   POCT GLUCOSE MONITORING CONTINUOUS       X-Ray Chest 1 View   Final Result      Stable chest with no acute cardiopulmonary process seen.         Electronically signed by: Karon Busbykatharine   Date:    05/22/2024   Time:    14:44          Final Diagnosis:  1. Hyperglycemia    2. Chest pain    3. SOB (shortness of breath)    4. RANJEET (acute kidney injury)    5. Essential hypertension        Plan:  - increase metoprolol to 100 mg b.i.d.  - p.r.n. analgesia using home nitro and aspirin  - schedule outpatient follow-up with Dr. Méndez in 6 weeks  - She has scheduled follow-up with her PCP in 2 weeks and informed that she may need an adjustment in her hyperglycemic medication regimen.  She was instructed to continue taking her insulin as needed and to adhere to a diabetic diet.  She was also encouraged to log her blood sugars to bring to her PCP.  - Patient educated on signs and symptoms to monitor for and when to return to ED. Patient verbalized understanding agrees with treatment plan. All questions and concerns addressed.    Discharge Condition: Stable    Disposition: Home or Self Care     Time spent on the discharge of the patient including review of hospital course with the patient. reviewing discharge medications and arranging follow-up care 15 minutes.  Patient was seen and examined on the date of discharge and determined to be suitable for discharge.    Follow Up:   ED Disposition Condition    Discharge Stable            ED Prescriptions       Medication Sig Dispense Start Date End Date Auth. Provider    metoprolol succinate (TOPROL-XL) 100 MG 24 hr tablet Take 1 tablet (100 mg total) by mouth once daily. 90 tablet 5/23/2024 -- Yoli Lombardi PA-C          Follow-up Information       Follow up With Specialties Details Why Contact Info    Chun Zapata MD Family Medicine Go to   [address] [place] [address]  [address]  [facility] [address]  [phone]      Jose L Méndez MD Cardiovascular  Disease, Cardiology, Interventional Cardiology Schedule an appointment as soon as possible for a visit in 6 weeks  [address] [facility]  [address]  [facility] [address]  [phone]      [facility] - Emergency Dept Emergency Medicine Go to  If symptoms worsen [address] [facility] [id number] [phone]            Future Appointments   Date Time Provider Department Center   5/30/2024  3:00 PM Holden Pereira MD [facility]   6/20/2024 10:00 AM Chun Zapata MD [facility] IM [facility]   6/24/2024  3:20 PM Judie Morales, OD [facility] OPT[facility] [place]   8/13/2024  2:00 PM Elvira Hylton, BRAN [facility]   11/15/2024  3:20 PM Jose L Méndez MD [facility] [id number]

## 2024-05-23 NOTE — ED NOTES
Late entry secondary to direct patient care. Assumed care of pt at change of shift with report given at the bedside. Pt ambulated to bathroom to have BM, requesting no more pure wick so I gave her a hat for accurate I&O. Medicated per MAR this morning.

## 2024-05-23 NOTE — ED NOTES
Pt c/o SOB. O2 96-97% on RA speaking in complete sentences. Placed on 1L nc for comfort. O2 97% on 1 L nc. Safety measures in place. Will continue to monitor.

## 2024-05-24 ENCOUNTER — PATIENT OUTREACH (OUTPATIENT)
Dept: EMERGENCY MEDICINE | Facility: OTHER | Age: 79
End: 2024-05-24
Payer: MEDICARE

## 2024-05-28 ENCOUNTER — PATIENT OUTREACH (OUTPATIENT)
Dept: ADMINISTRATIVE | Facility: CLINIC | Age: 79
End: 2024-05-28
Payer: MEDICARE

## 2024-05-28 DIAGNOSIS — Z00.00 ENCOUNTER FOR MEDICARE ANNUAL WELLNESS EXAM: ICD-10-CM

## 2024-05-28 NOTE — PROGRESS NOTES
C3 nurse attempted to contact Indira Waters for a TCC post hospital discharge follow up call. No answer. Left voicemail with callback information. The patient has a scheduled HOSFU appointment with Chun Zapata MD on 6/5/24 @ 1030am.

## 2024-05-30 ENCOUNTER — TELEPHONE (OUTPATIENT)
Dept: PAIN MEDICINE | Facility: CLINIC | Age: 79
End: 2024-05-30
Payer: MEDICARE

## 2024-05-30 NOTE — TELEPHONE ENCOUNTER
----- Message from Rod Capellan sent at 5/29/2024  3:34 PM CDT -----   Name of Who is Calling:     What is the request in detail: pharmacy request call back in reference to clarity on directions for medication   pregabalin (LYRICA) 150 MG capsule    Please contact to further discuss and advise      Can the clinic reply by [facility]:     What Number to Call Back [facility]  [facility]  / [phone]

## 2024-05-30 NOTE — TELEPHONE ENCOUNTER
----- Message from Marissa Hirsch sent at 5/30/2024 11:40 AM CDT -----  Regarding: call back  Type: Patient Call Back    Who called: Esha wilfredo     What is the request in detail: requesting call to reschedule upcoming appt with provider     Can the clinic reply by [facility]?no    Would the patient rather a call back or a response via [facility]? call    Best call back number: [phone]     Additional Information:

## 2024-05-30 NOTE — TELEPHONE ENCOUNTER
----- Message from Anyi Elizondo LPN sent at 5/30/2024  2:27 PM CDT -----  Patient is schedule in the clinic today. Please advise.  ----- Message -----  From: Rod Capellan  Sent: 5/30/2024   2:24 PM CDT  To: [facility] Pain Management Procedures     Name of Who is Calling:     What is the request in detail:  call back in reference  to reschedule injections due to weather / patient has appointment scheduled for 06/05/24 and want to know if can reschedule injection for same day   Please contact to further discuss and advise      Can the clinic reply by [facility]:     What Number to Call Back if not [facility]   [phone] / lora / wilfredo

## 2024-05-30 NOTE — TELEPHONE ENCOUNTER
Returned call in reference to rescheduling appointment. Spoke with daughter and rescheduled pt for 6/20 at 1:30pm. Daughter verbalized understanding.

## 2024-06-05 ENCOUNTER — LAB VISIT (OUTPATIENT)
Dept: LAB | Facility: OTHER | Age: 79
End: 2024-06-05
Attending: STUDENT IN AN ORGANIZED HEALTH CARE EDUCATION/TRAINING PROGRAM
Payer: MEDICARE

## 2024-06-05 ENCOUNTER — OFFICE VISIT (OUTPATIENT)
Dept: INTERNAL MEDICINE | Facility: CLINIC | Age: 79
End: 2024-06-05
Payer: MEDICARE

## 2024-06-05 VITALS
HEART RATE: 92 BPM | HEIGHT: 60 IN | SYSTOLIC BLOOD PRESSURE: 116 MMHG | OXYGEN SATURATION: 96 % | DIASTOLIC BLOOD PRESSURE: 54 MMHG | BODY MASS INDEX: 38.67 KG/M2

## 2024-06-05 DIAGNOSIS — M60.9 STATIN-INDUCED MYOSITIS: ICD-10-CM

## 2024-06-05 DIAGNOSIS — E11.65 TYPE 2 DIABETES MELLITUS WITH HYPERGLYCEMIA, WITH LONG-TERM CURRENT USE OF INSULIN: Chronic | ICD-10-CM

## 2024-06-05 DIAGNOSIS — E66.01 CLASS 2 SEVERE OBESITY DUE TO EXCESS CALORIES WITH SERIOUS COMORBIDITY AND BODY MASS INDEX (BMI) OF 37.0 TO 37.9 IN ADULT: ICD-10-CM

## 2024-06-05 DIAGNOSIS — G89.4 CHRONIC PAIN SYNDROME: Primary | ICD-10-CM

## 2024-06-05 DIAGNOSIS — J41.1 MUCOPURULENT CHRONIC BRONCHITIS: ICD-10-CM

## 2024-06-05 DIAGNOSIS — I50.32 CHRONIC DIASTOLIC (CONGESTIVE) HEART FAILURE: ICD-10-CM

## 2024-06-05 DIAGNOSIS — N18.4 STAGE 4 CHRONIC KIDNEY DISEASE: ICD-10-CM

## 2024-06-05 DIAGNOSIS — R07.9 CHEST PAIN WITH HIGH RISK FOR CARDIAC ETIOLOGY: ICD-10-CM

## 2024-06-05 DIAGNOSIS — E11.22 TYPE 2 DIABETES MELLITUS WITH CHRONIC KIDNEY DISEASE, WITH LONG-TERM CURRENT USE OF INSULIN, UNSPECIFIED CKD STAGE: ICD-10-CM

## 2024-06-05 DIAGNOSIS — E78.01 FAMILIAL HYPERCHOLESTEROLEMIA: Chronic | ICD-10-CM

## 2024-06-05 DIAGNOSIS — Z91.89 AT RISK FOR BLEEDING: ICD-10-CM

## 2024-06-05 DIAGNOSIS — I25.118 CORONARY ARTERY DISEASE OF NATIVE ARTERY OF NATIVE HEART WITH STABLE ANGINA PECTORIS: ICD-10-CM

## 2024-06-05 DIAGNOSIS — C53.9: ICD-10-CM

## 2024-06-05 DIAGNOSIS — R20.9 UNSPECIFIED DISTURBANCES OF SKIN SENSATION: ICD-10-CM

## 2024-06-05 DIAGNOSIS — I25.10 ATHSCL HEART DISEASE OF NATIVE CORONARY ARTERY W/O ANG PCTRS: ICD-10-CM

## 2024-06-05 DIAGNOSIS — T45.8X5S ADVERSE EFFECT OF BISPHOSPHONATE, SEQUELA: ICD-10-CM

## 2024-06-05 DIAGNOSIS — Z79.4 TYPE 2 DIABETES MELLITUS WITH CHRONIC KIDNEY DISEASE, WITH LONG-TERM CURRENT USE OF INSULIN, UNSPECIFIED CKD STAGE: ICD-10-CM

## 2024-06-05 DIAGNOSIS — R06.02 SHORTNESS OF BREATH: ICD-10-CM

## 2024-06-05 DIAGNOSIS — N18.32 STAGE 3B CHRONIC KIDNEY DISEASE: ICD-10-CM

## 2024-06-05 DIAGNOSIS — I10 ESSENTIAL HYPERTENSION: Chronic | ICD-10-CM

## 2024-06-05 DIAGNOSIS — R91.8 LUNG MASS: ICD-10-CM

## 2024-06-05 DIAGNOSIS — Z91.148 NONCOMPLIANCE WITH MEDICATION REGIMEN: ICD-10-CM

## 2024-06-05 DIAGNOSIS — T46.6X5A STATIN-INDUCED MYOSITIS: ICD-10-CM

## 2024-06-05 DIAGNOSIS — E11.42 DIABETIC POLYNEUROPATHY ASSOCIATED WITH TYPE 2 DIABETES MELLITUS: Chronic | ICD-10-CM

## 2024-06-05 DIAGNOSIS — Z79.4 TYPE 2 DIABETES MELLITUS WITH HYPERGLYCEMIA, WITH LONG-TERM CURRENT USE OF INSULIN: Chronic | ICD-10-CM

## 2024-06-05 DIAGNOSIS — M81.0 AGE-RELATED OSTEOPOROSIS WITHOUT CURRENT PATHOLOGICAL FRACTURE: ICD-10-CM

## 2024-06-05 DIAGNOSIS — D69.0 IGA MEDIATED LEUKOCYTOCLASTIC VASCULITIS: ICD-10-CM

## 2024-06-05 LAB
25(OH)D3+25(OH)D2 SERPL-MCNC: 56 NG/ML (ref 30–96)
ALBUMIN SERPL BCP-MCNC: 3.3 G/DL (ref 3.5–5.2)
ALP SERPL-CCNC: 49 U/L (ref 55–135)
ALT SERPL W/O P-5'-P-CCNC: 19 U/L (ref 10–44)
ANION GAP SERPL CALC-SCNC: 11 MMOL/L (ref 8–16)
AST SERPL-CCNC: 22 U/L (ref 10–40)
BILIRUB SERPL-MCNC: 0.2 MG/DL (ref 0.1–1)
BNP SERPL-MCNC: 136 PG/ML (ref 0–99)
BUN SERPL-MCNC: 24 MG/DL (ref 8–23)
CALCIUM SERPL-MCNC: 9.5 MG/DL (ref 8.7–10.5)
CHLORIDE SERPL-SCNC: 100 MMOL/L (ref 95–110)
CHOLEST SERPL-MCNC: 197 MG/DL (ref 120–199)
CHOLEST/HDLC SERPL: 5.6 {RATIO} (ref 2–5)
CK SERPL-CCNC: 39 U/L (ref 20–180)
CO2 SERPL-SCNC: 24 MMOL/L (ref 23–29)
CREAT SERPL-MCNC: 1.5 MG/DL (ref 0.5–1.4)
ERYTHROCYTE [DISTWIDTH] IN BLOOD BY AUTOMATED COUNT: 16.1 % (ref 11.5–14.5)
EST. GFR  (NO RACE VARIABLE): 35 ML/MIN/1.73 M^2
ESTIMATED AVG GLUCOSE: 226 MG/DL (ref 68–131)
GLUCOSE SERPL-MCNC: 319 MG/DL (ref 70–110)
HBA1C MFR BLD: 9.5 % (ref 4–5.6)
HCT VFR BLD AUTO: 31.9 % (ref 37–48.5)
HDLC SERPL-MCNC: 35 MG/DL (ref 40–75)
HDLC SERPL: 17.8 % (ref 20–50)
HGB BLD-MCNC: 10 G/DL (ref 12–16)
LDLC SERPL CALC-MCNC: 92.4 MG/DL (ref 63–159)
MCH RBC QN AUTO: 27.6 PG (ref 27–31)
MCHC RBC AUTO-ENTMCNC: 31.3 G/DL (ref 32–36)
MCV RBC AUTO: 88 FL (ref 82–98)
NONHDLC SERPL-MCNC: 162 MG/DL
PLATELET # BLD AUTO: 262 K/UL (ref 150–450)
PMV BLD AUTO: 11.4 FL (ref 9.2–12.9)
POTASSIUM SERPL-SCNC: 4.8 MMOL/L (ref 3.5–5.1)
PROT SERPL-MCNC: 6.7 G/DL (ref 6–8.4)
RBC # BLD AUTO: 3.62 M/UL (ref 4–5.4)
SODIUM SERPL-SCNC: 135 MMOL/L (ref 136–145)
TRIGL SERPL-MCNC: 348 MG/DL (ref 30–150)
TSH SERPL DL<=0.005 MIU/L-ACNC: 2.06 UIU/ML (ref 0.4–4)
URATE SERPL-MCNC: 4.7 MG/DL (ref 2.4–5.7)
VIT B12 SERPL-MCNC: >2000 PG/ML (ref 210–950)
WBC # BLD AUTO: 11.97 K/UL (ref 3.9–12.7)

## 2024-06-05 PROCEDURE — 84550 ASSAY OF BLOOD/URIC ACID: CPT | Performed by: STUDENT IN AN ORGANIZED HEALTH CARE EDUCATION/TRAINING PROGRAM

## 2024-06-05 PROCEDURE — 83036 HEMOGLOBIN GLYCOSYLATED A1C: CPT | Performed by: STUDENT IN AN ORGANIZED HEALTH CARE EDUCATION/TRAINING PROGRAM

## 2024-06-05 PROCEDURE — 80053 COMPREHEN METABOLIC PANEL: CPT | Performed by: STUDENT IN AN ORGANIZED HEALTH CARE EDUCATION/TRAINING PROGRAM

## 2024-06-05 PROCEDURE — 82306 VITAMIN D 25 HYDROXY: CPT | Performed by: STUDENT IN AN ORGANIZED HEALTH CARE EDUCATION/TRAINING PROGRAM

## 2024-06-05 PROCEDURE — 82550 ASSAY OF CK (CPK): CPT | Performed by: STUDENT IN AN ORGANIZED HEALTH CARE EDUCATION/TRAINING PROGRAM

## 2024-06-05 PROCEDURE — 99215 OFFICE O/P EST HI 40 MIN: CPT | Mod: PBBFAC | Performed by: STUDENT IN AN ORGANIZED HEALTH CARE EDUCATION/TRAINING PROGRAM

## 2024-06-05 PROCEDURE — 83880 ASSAY OF NATRIURETIC PEPTIDE: CPT | Performed by: STUDENT IN AN ORGANIZED HEALTH CARE EDUCATION/TRAINING PROGRAM

## 2024-06-05 PROCEDURE — 85027 COMPLETE CBC AUTOMATED: CPT | Performed by: STUDENT IN AN ORGANIZED HEALTH CARE EDUCATION/TRAINING PROGRAM

## 2024-06-05 PROCEDURE — 82607 VITAMIN B-12: CPT | Performed by: STUDENT IN AN ORGANIZED HEALTH CARE EDUCATION/TRAINING PROGRAM

## 2024-06-05 PROCEDURE — 80061 LIPID PANEL: CPT | Performed by: STUDENT IN AN ORGANIZED HEALTH CARE EDUCATION/TRAINING PROGRAM

## 2024-06-05 PROCEDURE — G2211 COMPLEX E/M VISIT ADD ON: HCPCS | Mod: S$PBB,,, | Performed by: STUDENT IN AN ORGANIZED HEALTH CARE EDUCATION/TRAINING PROGRAM

## 2024-06-05 PROCEDURE — 99215 OFFICE O/P EST HI 40 MIN: CPT | Mod: S$PBB,,, | Performed by: STUDENT IN AN ORGANIZED HEALTH CARE EDUCATION/TRAINING PROGRAM

## 2024-06-05 PROCEDURE — 36415 COLL VENOUS BLD VENIPUNCTURE: CPT | Performed by: STUDENT IN AN ORGANIZED HEALTH CARE EDUCATION/TRAINING PROGRAM

## 2024-06-05 PROCEDURE — 99999 PR PBB SHADOW E&M-EST. PATIENT-LVL V: CPT | Mod: PBBFAC,,, | Performed by: STUDENT IN AN ORGANIZED HEALTH CARE EDUCATION/TRAINING PROGRAM

## 2024-06-05 PROCEDURE — 84443 ASSAY THYROID STIM HORMONE: CPT | Performed by: STUDENT IN AN ORGANIZED HEALTH CARE EDUCATION/TRAINING PROGRAM

## 2024-06-05 RX ORDER — TORSEMIDE 20 MG/1
20 TABLET ORAL DAILY
Qty: 30 TABLET | Refills: 11 | Status: SHIPPED | OUTPATIENT
Start: 2024-06-05 | End: 2025-06-05

## 2024-06-05 NOTE — PROGRESS NOTES
Transitional Care Note    Family and/or Caretaker present at visit?  Yes.  Diagnostic tests reviewed/disposition: I have reviewed all completed as well as pending diagnostic tests at the time of discharge.  Disease/illness education: yes  Home health/community services discussion/referrals: Patient does not have home health established from hospital visit.  They do not need home health.  If needed, we will set up home health for the patient.   Establishment or re-establishment of referral orders for community resources: No other necessary community resources.   Discussion with other health care providers: No discussion with other health care providers necessary.         [facility]    Subjective:      Patient ID: Indira Waters is a 79 y.o. female.    Chief Complaint: Hospital Follow Up      History was obtained from the patient and supplemented through chart review.  This pt is known to me.    HPI:    Patient is a 79 y.o. female w/pmhx including DM, HTN, HLD, CAD, fibromyalgia    Pt and daughter declines us setting up follow-up appts because of their transportation and scheduling needs.    Seen once in the past year and a half.  Prior appt was changed to virtual at last minute    Recent ED short stay 5/23-5/24/24 for chest pain, hyperglycemia  Doing well though sob and swollen, taking furosemide 40 daily    DM  Needs A1C today  Glucose better controlled, 88 last night    Severely deconditioned  Exercise encouraged, movement difficult, encouraged, using exercise trampoline    CAD\CHF HFpEF  Followed by Dr. Méndez  Cardiac Cath 5/6/2022 with significant blockages   Taking ASA 81, plavix  Not taking statin due to vasculitis thought to be 2/2 statin; on repatha   Nitro prn  Return to cardiology  Switch furosemide to torsemide due to low albumin, on fluid overload     Normocytic Anemia  Family history of colon cancer in grandmother  Getting additional studies    Fibromyalgia   Chronic Pain  Syndrome  Stable currently    Osteoporosis  Did not tolerate bisphosphonate in the past  AE dicussed, On forteo with endocrinology    HTN  Procardia 30 BID, losartan 50 mg daily? (Appears to not be taking), clonidine 0.1 mg BID, toprol 100 mg BID    CHF   Grade II DD  Lasix 40 mg daily  Doing well    Hx of Drug induced Vasculitis  Thought to be 2/2 to atorvastatin, switched to repatha q 14 days    CKD Stage 3b/4  5 referrals 5 needed to get pt to nephro 2022, but then did not return   Avoid nsaids, nephrotoxic meds  Also with constipation    Depression  zoloft 100 mg daily   Stable though not not feeling well    History of cervical cancer at about age 31, never a problem later in life  S/p hyst with 1 ovary remaining  Saw urogyn 2023    Wt Readings from Last 15 Encounters:   24 89.8 kg (198 lb)   05/15/24 89.9 kg (198 lb 3.2 oz)   05/10/24 84 kg (185 lb 3 oz)   24 84 kg (185 lb 3 oz)   24 84.6 kg (186 lb 8.2 oz)   24 84.6 kg (186 lb 9.6 oz)   24 90 kg (198 lb 6.6 oz)   23 88.5 kg (195 lb)   12/15/23 88.9 kg (195 lb 15.8 oz)   23 88.5 kg (195 lb 1.7 oz)   11/15/23 88.5 kg (195 lb)   09/15/23 90.3 kg (199 lb 1.2 oz)   23 88.6 kg (195 lb 5.2 oz)   23 88.6 kg (195 lb 6.4 oz)   23 89.7 kg (197 lb 12 oz)      Son and   on mothers' day    Not addressed    Lupus? Pt reports  STEVE normal 2022  Not treated   Pt thinks it is currently flared  STEVE normal 2022  C3 and C4 normal       Medical History  Past Medical History:   Diagnosis Date    Arthritis     Back pain     Cancer     ovarian    Cervical cancer     Coronary artery disease     Depression     Diabetes mellitus     Fibromyalgia     Heart attack     History of MI (myocardial infarction)     Hyperlipidemia     Hypertension     Lupus     Stroke     slight left sided weakness       Review of Systems   Constitutional:  Negative for activity change and unexpected weight  change.   HENT:  Negative for hearing loss and rhinorrhea.    Eyes:  Negative for discharge and visual disturbance.   Respiratory:  Positive for shortness of breath (only mildly worse than normal). Negative for chest tightness and wheezing.    Cardiovascular:  Positive for leg swelling. Negative for chest pain and palpitations.   Gastrointestinal:  Negative for blood in stool, constipation, diarrhea and vomiting.   Endocrine: Negative for polydipsia and polyuria.   Genitourinary:  Negative for difficulty urinating, dysuria, hematuria and menstrual problem.   Musculoskeletal:  Positive for arthralgias, joint swelling and neck pain.   Neurological:  Negative for weakness and headaches.   Psychiatric/Behavioral:  Negative for confusion and dysphoric mood.          Surgical hx, family hx, social hx   Have been reviewed      Current Outpatient Medications:     acetaminophen (TYLENOL) 500 MG tablet, Take 2 tablets (1,000 mg total) by mouth every 8 (eight) hours as needed for Pain., Disp: , Rfl: 0    allopurinoL (ZYLOPRIM) 300 MG tablet, TAKE ONE TABLET BY MOUTH EVERY DAY, Disp: 90 tablet, Rfl: 1    aspirin (ECOTRIN) 81 MG EC tablet, Take 1 tablet (81 mg total) by mouth once daily., Disp: 30 tablet, Rfl: 0    blood sugar diagnostic Strp, To check BG 6 times daily, to use with insurance preferred meter, Disp: 200 each, Rfl: 11    blood-glucose meter kit, To check BG 6 times daily, to use with insurance preferred meter, Disp: 1 each, Rfl: 0    cloNIDine (CATAPRES) 0.1 MG tablet, Take 1 tablet (0.1 mg total) by mouth 2 (two) times daily., Disp: 180 tablet, Rfl: 3    evolocumab (REPATHA SURECLICK) 140 mg/mL PnIj, Inject 1 mL (140 mg total) into the skin every 14 (fourteen) days., Disp: 2 each, Rfl: 11    fenofibrate micronized (LOFIBRA) 134 MG Cap, Take 1 capsule (134 mg total) by mouth daily with breakfast., Disp: 90 capsule, Rfl: 3    HYDROcodone-acetaminophen (NORCO)  mg per tablet, Take 1 tablet by mouth  "every 8 (eight) hours as needed for Pain., Disp: 90 tablet, Rfl: 0    hydrocortisone (ANUSOL-HC) 2.5 % rectal cream, Procto-Med HC 2.5 % topical cream perineal applicator, Disp: , Rfl:     insulin aspart U-100 (NOVOLOG FLEXPEN U-100 INSULIN) 100 unit/mL (3 mL) InPn pen, Inject 14 Units into the skin 3 (three) times daily with meals. + meal correction scale. Max 40 units, Disp: 30 mL, Rfl: 3    insulin detemir U-100, Levemir, (LEVEMIR FLEXTOUCH U100 INSULIN) 100 unit/mL (3 mL) InPn pen, Inject 20 Units into the skin once daily AND 18 Units every evening., Disp: 30 mL, Rfl: 3    ipratropium-albuteroL (COMBIVENT)  mcg/actuation inhaler, Inhale 1 puff into the lungs by mouth every 6 (six) hours., Disp: 4 g, Rfl: 0    lancets Misc, To check BG 6 times daily, to use with insurance preferred meter, Disp: 200 each, Rfl: 11    losartan (COZAAR) 50 MG tablet, Take 1 tablet (50 mg total) by mouth once daily., Disp: 90 tablet, Rfl: 3    methocarbamoL (ROBAXIN) 500 MG Tab, TAKE ONE TABLET BY MOUTH 3 TIMES DAILY AS NEEDED FOR MUSCLE SPASMS, Disp: 90 tablet, Rfl: 2    metoclopramide HCl (REGLAN) 5 MG tablet, TAKE ONE TABLET BY MOUTH FOUR TIMES DAILY AS NEEDED FOR NAUSEA PREVENTION, Disp: 30 tablet, Rfl: 3    metoprolol succinate (TOPROL-XL) 100 MG 24 hr tablet, Take 1 tablet (100 mg total) by mouth once daily., Disp: 90 tablet, Rfl: 3    mupirocin (BACTROBAN) 2 % ointment, Apply topically 3 (three) times daily as needed (skin breakdown)., Disp: 22 g, Rfl: 3    NIFEdipine (PROCARDIA-XL) 30 MG (OSM) 24 hr tablet, Take 1 tablet (30 mg total) by mouth 2 (two) times a day., Disp: 180 tablet, Rfl: 3    pen needle, diabetic 31 gauge x 1/4" Ndle, 1 each by Misc.(Non-Drug; Combo Route) route 5 (five) times daily., Disp: 500 each, Rfl: 3    pregabalin (LYRICA) 150 MG capsule, Take 1 capsule (150 mg total) by mouth 4 (four) times daily. TAKE ONE CAPSULE BY MOUTH 3 TIMES DAILY, Disp: 120 capsule, Rfl: 2    senna-docusate " 8.6-50 mg (PERICOLACE) 8.6-50 mg per tablet, Take 1 tablet by mouth 2 (two) times daily as needed for Constipation., Disp: 60 tablet, Rfl: 0    sertraline (ZOLOFT) 100 MG tablet, Take 1 tablet (100 mg total) by mouth once daily., Disp: 90 tablet, Rfl: 3    teriparatide 20 mcg/dose (620mcg/2.48mL) PnIj, Inject 20 mcg into the skin once daily. Discard remainder after 28 days of use., Disp: 2.48 mL, Rfl: 5    triamcinolone acetonide 0.1% (KENALOG) 0.1 % cream, Apply to affected areas of body twice daily as needed rash. Do not use on face, underarms, or groin., Disp: 454 g, Rfl: 0    atorvastatin (LIPITOR) 40 MG tablet, Take 40 mg by mouth every evening. (Patient not taking: Reported on 6/5/2024), Disp: , Rfl:     blood-glucose meter,continuous (DEXCOM G7 ) Misc, Use as directed. (Patient not taking: Reported on 6/5/2024), Disp: 1 each, Rfl: 0    blood-glucose sensor (DEXCOM G7 SENSOR) Nicole, Change every 10 days. (Patient not taking: Reported on 5/28/2024), Disp: 3 each, Rfl: 11    dicyclomine (BENTYL) 10 MG capsule, Take 10 mg by mouth 3 (three) times daily. (Patient not taking: Reported on 5/28/2024), Disp: , Rfl:     fluticasone propionate (FLONASE) 50 mcg/actuation nasal spray, Inhale 1 spray every day by intranasal route as needed. (Patient not taking: Reported on 5/28/2024), Disp: , Rfl:     nitroGLYCERIN (NITROSTAT) 0.4 MG SL tablet, Place 1 tablet (0.4 mg total) under the tongue every 5 (five) minutes as needed for Chest pain. (Patient not taking: Reported on 5/28/2024), Disp: 25 tablet, Rfl: 11    pantoprazole (PROTONIX) 40 MG tablet, Take 1 tablet (40 mg total) by mouth once daily. (Patient not taking: Reported on 5/15/2024), Disp: 30 tablet, Rfl: 0    pioglitazone (ACTOS) 15 MG tablet, Take 15 mg by mouth once daily., Disp: , Rfl:     torsemide (DEMADEX) 20 MG Tab, Take 1 tablet (20 mg total) by mouth once daily., Disp: 30 tablet, Rfl: 11    Current Facility-Administered Medications:      capsaicin-skin cleanser patch 1 patch, 1 patch, Topical (Top), 1 time in Clinic/HOD, Elvira Hylton NP    Objective:        Body mass index is 38.67 kg/m².  Vitals:    06/05/24 1020   BP: (!) 116/54   Pulse: 92   SpO2: 96%   Height: 5' (1.524 m)   PainSc:   7   PainLoc: Back             Physical Exam  Vitals and nursing note reviewed.   Constitutional:       General: She is not in acute distress.     Appearance: Normal appearance. She is not ill-appearing.   HENT:      Head: Normocephalic and atraumatic.   Eyes:      General: No scleral icterus.  Cardiovascular:      Rate and Rhythm: Normal rate and regular rhythm.      Heart sounds: Normal heart sounds.   Pulmonary:      Effort: Pulmonary effort is normal.   Musculoskeletal:         General: No deformity.      Right lower leg: Edema (+3 mid shin) present.      Left lower leg: Edema (+3 mid shin) present.   Neurological:      Mental Status: She is alert and oriented to person, place, and time.   Psychiatric:         Behavior: Behavior normal.           Lab Results   Component Value Date    WBC 11.97 06/05/2024    HGB 10.0 (L) 06/05/2024    HCT 31.9 (L) 06/05/2024     06/05/2024    CHOL 197 06/05/2024    TRIG 348 (H) 06/05/2024    HDL 35 (L) 06/05/2024    ALT 19 06/05/2024    AST 22 06/05/2024     (L) 06/05/2024    K 4.8 06/05/2024     06/05/2024    CREATININE 1.5 (H) 06/05/2024    BUN 24 (H) 06/05/2024    CO2 24 06/05/2024    TSH 2.059 06/05/2024    INR 1.0 01/31/2024    HGBA1C 9.5 (H) 06/05/2024       The ASCVD Risk score (Annamarie DK, et al., 2019) failed to calculate for the following reasons:    The patient has a prior MI or stroke diagnosis    Repatha    (Imaging have been independently reviewed)    Chest xray      Assessment:         1. Chronic pain syndrome    2. Familial hypercholesterolemia    3. Essential hypertension    4. Athscl heart disease of native coronary artery w/o ang pctrs    5. Stage 4 chronic kidney disease    6. Type 2  diabetes mellitus with hyperglycemia, with long-term current use of insulin    7. At risk for bleeding    8. Diabetic polyneuropathy associated with type 2 diabetes mellitus    9. Age-related osteoporosis without current pathological fracture    10. Carcinoma of uterine cervix, invasive    11. Lung mass    12. Unspecified disturbances of skin sensation    13. Mucopurulent chronic bronchitis    14. Shortness of breath    15. Statin-induced myositis    16. Adverse effect of bisphosphonate, sequela    17. Noncompliance with medication regimen    18. Class 2 severe obesity due to excess calories with serious comorbidity and body mass index (BMI) of 37.0 to 37.9 in adult    19. Type 2 diabetes mellitus with chronic kidney disease, with long-term current use of insulin, unspecified CKD stage    20. Stage 3b chronic kidney disease    21. Chronic diastolic (congestive) heart failure    22. 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 pectoris    23. Chest pain with high risk for cardiac etiology    24. IgA mediated leukocytoclastic vasculitis                    Plan:     Indira was seen today for hospital follow up.    Diagnoses and all orders for this visit:    Chronic pain syndrome    Familial hypercholesterolemia  -     TSH; Future    Essential hypertension    Athscl heart disease of native coronary artery w/o ang pctrs  -     Lipid Panel; Future    Stage 4 chronic kidney disease  -     Comprehensive Metabolic Panel; Future  -     Vitamin D; Future  -     CK; Future    Type 2 diabetes mellitus with hyperglycemia, with long-term current use of insulin  -     Hemoglobin A1C; Future  -     Comprehensive Metabolic Panel; Future    At risk for bleeding  -     CBC Without Differential; Future    Diabetic polyneuropathy associated with type 2 diabetes mellitus  -     Vitamin B12; Future    Age-related osteoporosis without current pathological fracture    Carcinoma of uterine cervix,  invasive    Lung mass    Unspecified disturbances of skin sensation  -     Vitamin B12; Future    Mucopurulent chronic bronchitis    Shortness of breath  -     B-TYPE NATRIURETIC PEPTIDE; Future    Statin-induced myositis    Adverse effect of bisphosphonate, sequela    Noncompliance with medication regimen    Class 2 severe obesity due to excess calories with serious comorbidity and body mass index (BMI) of 37.0 to 37.9 in adult    Type 2 diabetes mellitus with chronic kidney disease, with long-term current use of insulin, unspecified CKD stage    Stage 3b chronic kidney disease  -     URIC ACID; Future    Chronic diastolic (congestive) heart failure  -     torsemide (DEMADEX) 20 MG Tab; Take 1 tablet (20 mg total) by mouth once daily.    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 pectoris    Chest pain with high risk for cardiac etiology    IgA mediated leukocytoclastic vasculitis      Pt and daughter declines us setting up follow-up appts because of their transportation and scheduling needs.    Follow up in 3 weeks (on 6/26/2024) for already made appt 6/20. or sooner prn      Tests to Keep You Healthy    Eye Exam: SCHEDULED    51 min were used in chart review, evaluation and counseling of patient, documentation and review of results on same day of service    Visit today is associated with current or anticipated ongoing medical care related to this patient's single serious condition/complex condition of diabetes, chest pain, shortness of breath. The patient will return to see me as these issues will be followed longitudinally.      All medications were reviewed including potential side effects and risks/benefits.  Pt was counseled to call back if anything worsens or if questions arise.    Chun Zapata MD  Family Medicine  [facility]  [facility] [facility]  [address]  [place] [address]  Phone [phone]  Fax [phone]

## 2024-06-06 ENCOUNTER — PATIENT OUTREACH (OUTPATIENT)
Dept: ADMINISTRATIVE | Facility: HOSPITAL | Age: 79
End: 2024-06-06
Payer: MEDICARE

## 2024-06-06 DIAGNOSIS — N18.32 STAGE 3B CHRONIC KIDNEY DISEASE: Primary | ICD-10-CM

## 2024-06-07 ENCOUNTER — TELEPHONE (OUTPATIENT)
Dept: INTERNAL MEDICINE | Facility: CLINIC | Age: 79
End: 2024-06-07
Payer: MEDICARE

## 2024-06-07 NOTE — TELEPHONE ENCOUNTER
----- Message from Chun Zapata MD sent at 6/6/2024  5:18 PM CDT -----  Please schedule my  protein:creatine urine test, my renal function panel, dr. Newsome's PTH, CBC  next week please

## 2024-06-07 NOTE — TELEPHONE ENCOUNTER
"Spoke with patient. Labs and urine test scheduled 06/12/24. Patient expressed understanding and gratitude. Call ended.      Dr. Zapata's orders and results mailed to patient's home address today, 06/07/24.     "    Chun Zapata MD  6/6/2024  5:18 PM CDT Back to Top      Please schedule my  protein:creatine urine test, my renal function panel, dr. Newsome's PTH, CBC  next week please     "  "     Good afternoon,     -Your kidney function is slightly decreased, my suspicion is that you are a little fluid overloaded now, causing stress on the kidneys.  Let's recheck in a week or so. And please do set up appt with Dr. Newsome  -Your liver function is normal.  -Your blood counts are stable.  -Your thyroid function is normal.  -Your cholesterol is within an acceptable range. Exercise and healthy eating is always recommended.  -Your vit D is normal!  -Your average glucose is slightly better than before, but still high and should qualify you for a continuous glucose monitor.  Please do reach out to endocrinology again for next steps.  -Your triglycerides are elevated which is typically attributed to alcohol or snack food.  I recommend a decrease in both if applicable and increased exercise.     Please contact us for an appointment to discuss any concerns further.  Sincerely,  Chun Zapata MD     "  "

## 2024-06-11 DIAGNOSIS — G89.4 CHRONIC PAIN SYNDROME: ICD-10-CM

## 2024-06-11 DIAGNOSIS — G89.4 CHRONIC PAIN DISORDER: ICD-10-CM

## 2024-06-11 DIAGNOSIS — E08.42 DIABETIC POLYNEUROPATHY ASSOCIATED WITH DIABETES MELLITUS DUE TO UNDERLYING CONDITION: ICD-10-CM

## 2024-06-12 ENCOUNTER — LAB VISIT (OUTPATIENT)
Dept: LAB | Facility: OTHER | Age: 79
End: 2024-06-12
Attending: INTERNAL MEDICINE
Payer: MEDICARE

## 2024-06-12 DIAGNOSIS — N18.32 STAGE 3B CHRONIC KIDNEY DISEASE: ICD-10-CM

## 2024-06-12 DIAGNOSIS — K31.84 GASTROPARESIS: ICD-10-CM

## 2024-06-12 DIAGNOSIS — N18.4 STAGE 4 CHRONIC KIDNEY DISEASE: ICD-10-CM

## 2024-06-12 LAB
ALBUMIN SERPL BCP-MCNC: 3.5 G/DL (ref 3.5–5.2)
ANION GAP SERPL CALC-SCNC: 10 MMOL/L (ref 8–16)
BASOPHILS # BLD AUTO: 0.05 K/UL (ref 0–0.2)
BASOPHILS NFR BLD: 0.4 % (ref 0–1.9)
BUN SERPL-MCNC: 29 MG/DL (ref 8–23)
CALCIUM SERPL-MCNC: 9.5 MG/DL (ref 8.7–10.5)
CHLORIDE SERPL-SCNC: 100 MMOL/L (ref 95–110)
CO2 SERPL-SCNC: 26 MMOL/L (ref 23–29)
CREAT SERPL-MCNC: 1.5 MG/DL (ref 0.5–1.4)
DIFFERENTIAL METHOD BLD: ABNORMAL
EOSINOPHIL # BLD AUTO: 0.4 K/UL (ref 0–0.5)
EOSINOPHIL NFR BLD: 3.1 % (ref 0–8)
ERYTHROCYTE [DISTWIDTH] IN BLOOD BY AUTOMATED COUNT: 16 % (ref 11.5–14.5)
EST. GFR  (NO RACE VARIABLE): 35 ML/MIN/1.73 M^2
GLUCOSE SERPL-MCNC: 203 MG/DL (ref 70–110)
HCT VFR BLD AUTO: 32.4 % (ref 37–48.5)
HGB BLD-MCNC: 10.2 G/DL (ref 12–16)
IMM GRANULOCYTES # BLD AUTO: 0.11 K/UL (ref 0–0.04)
IMM GRANULOCYTES NFR BLD AUTO: 0.9 % (ref 0–0.5)
LYMPHOCYTES # BLD AUTO: 2.5 K/UL (ref 1–4.8)
LYMPHOCYTES NFR BLD: 21 % (ref 18–48)
MCH RBC QN AUTO: 27.3 PG (ref 27–31)
MCHC RBC AUTO-ENTMCNC: 31.5 G/DL (ref 32–36)
MCV RBC AUTO: 87 FL (ref 82–98)
MONOCYTES # BLD AUTO: 0.6 K/UL (ref 0.3–1)
MONOCYTES NFR BLD: 5.2 % (ref 4–15)
NEUTROPHILS # BLD AUTO: 8.4 K/UL (ref 1.8–7.7)
NEUTROPHILS NFR BLD: 69.4 % (ref 38–73)
NRBC BLD-RTO: 0 /100 WBC
PHOSPHATE SERPL-MCNC: 2.9 MG/DL (ref 2.7–4.5)
PLATELET # BLD AUTO: 283 K/UL (ref 150–450)
PMV BLD AUTO: 11.8 FL (ref 9.2–12.9)
POTASSIUM SERPL-SCNC: 4.2 MMOL/L (ref 3.5–5.1)
PTH-INTACT SERPL-MCNC: 23.8 PG/ML (ref 9–77)
RBC # BLD AUTO: 3.74 M/UL (ref 4–5.4)
SODIUM SERPL-SCNC: 136 MMOL/L (ref 136–145)
WBC # BLD AUTO: 12.11 K/UL (ref 3.9–12.7)

## 2024-06-12 PROCEDURE — 80069 RENAL FUNCTION PANEL: CPT | Performed by: STUDENT IN AN ORGANIZED HEALTH CARE EDUCATION/TRAINING PROGRAM

## 2024-06-12 PROCEDURE — 36415 COLL VENOUS BLD VENIPUNCTURE: CPT | Performed by: STUDENT IN AN ORGANIZED HEALTH CARE EDUCATION/TRAINING PROGRAM

## 2024-06-12 PROCEDURE — 83970 ASSAY OF PARATHORMONE: CPT | Performed by: INTERNAL MEDICINE

## 2024-06-12 PROCEDURE — 85025 COMPLETE CBC W/AUTO DIFF WBC: CPT | Performed by: INTERNAL MEDICINE

## 2024-06-12 RX ORDER — METOCLOPRAMIDE 5 MG/1
TABLET ORAL
Qty: 30 TABLET | Refills: 3 | Status: SHIPPED | OUTPATIENT
Start: 2024-06-12

## 2024-06-12 RX ORDER — HYDROCODONE BITARTRATE AND ACETAMINOPHEN 10; 325 MG/1; MG/1
1 TABLET ORAL EVERY 8 HOURS PRN
Qty: 90 TABLET | Refills: 0 | Status: SHIPPED | OUTPATIENT
Start: 2024-06-12 | End: 2024-07-12

## 2024-06-12 NOTE — TELEPHONE ENCOUNTER
Patient requesting refill on norco  Last office visit 5/10/2024   shows last refill on 05/15/2024  Patient does have a pain contract on file with [facility]  Patient last UDS 12/14/2023 was consistent with current therapy     CODEINE   Not Detected   Not Detected       Not Detected   Comment: INTERPRETIVE INFORMATION: Codeine, U  Positive Cutoff: 40 ng/mL  Methodology: Mass Spectrometry   MORPHINE   Not Detected   Not Detected       Not Detected   Comment: INTERPRETIVE INFORMATION:Morphine, U  Positive Cutoff: 20 ng/mL  Methodology: Mass Spectrometry   6-ACETYLMORPHINE   Not Detected   Not Detected       Not Detected   Comment: INTERPRETIVE INFORMATION:6-acetylmorphine, U  Positive Cutoff: 20 ng/mL  Methodology: Mass Spectrometry   OXYCODONE   Not Detected   Not Detected       Not Detected   Comment: INTERPRETIVE INFORMATION:Oxycodone, U  Positive Cutoff: 40 ng/mL  Methodology: Mass Spectrometry   NOROYXCODONE   Not Detected   Not Detected       Not Detected   Comment: INTERPRETIVE INFORMATION:Noroxycodone, U  Positive Cutoff: 100 ng/mL  Methodology: Mass Spectrometry   OXYMORPHONE   Not Detected   Not Detected       Not Detected   Comment: INTERPRETIVE INFORMATION:Oxymorphone, U  Positive Cutoff: 40 ng/mL  Methodology: Mass Spectrometry   NOROXYMORPHONE   Not Detected   Not Detected       Not Detected   Comment: INTERPRETIVE INFORMATION:Noroxymorphone, U  Positive Cutoff: 100 ng/mL  Methodology: Mass Spectrometry   HYDROCODONE   Present   Present       Present   Comment: INTERPRETIVE INFORMATION:Hydrocodone, U  Positive Cutoff: 40 ng/mL  Methodology: Mass Spectrometry   NORHYDROCODONE   Present   Present       Present   Comment: INTERPRETIVE INFORMATION:Norhydrocodone, U  Positive Cutoff: 100 ng/mL  Methodology: Mass Spectrometry   HYDROMORPHONE   Present   Present       Present   Comment: INTERPRETIVE INFORMATION:Hydromorphone, U  Positive Cutoff: 20 ng/mL  Methodology: Mass  Spectrometry   BUPRENORPHINE   Not Detected   Not Detected       Not Detected   Comment: INTERPRETIVE INFORMATION:Buprenorphine, U  Positive Cutoff: 5 ng/mL  Methodology: Mass Spectrometry   NORUBPRENORPHINE   Not Detected   Not Detected       Not Detected   Comment: INTERPRETIVE INFORMATION:Norbuprenorphine, U  Positive Cutoff: 20 ng/mL  Methodology: Mass Spectrometry   FENTANYL   Not Detected   Not Detected       Not Detected   Comment: INTERPRETIVE INFORMATION:Fentanyl, U  Positive Cutoff: 2 ng/mL  Methodology: Mass Spectrometry   NORFENTANYL   Not Detected   Not Detected       Not Detected   Comment: INTERPRETIVE INFORMATION:Norfentanyl, U  Positive Cutoff: 2 ng/mL  Methodology: Mass Spectrometry   MEPERIDINE METABOLITE   Not Detected   Not Detected       Not Detected   Comment: INTERPRETIVE INFORMATION:Meperidine metabolite, U  Positive Cutoff: 50 ng/mL  Methodology: Mass Spectrometry   TAPENTADOL   Not Detected   Not Detected       Not Detected   Comment: INTERPRETIVE INFORMATION:Tapentadol, U  Positive Cutoff: 100 ng/mL  Methodology: Mass Spectrometry   TAPENTADOL-O-SULF   Not Detected   Not Detected       Not Detected   Comment: INTERPRETIVE INFORMATION:Tapentadol-o-Sulf, U  Positive Cutoff: 200 ng/mL  Methodology: Mass Spectrometry   METHADONE   Negative   Not Detected       Not Detected   Comment: Presumptive negative by immunoassay. Testing by mass  spectrometry is available on request.  INTERPRETIVE INFORMATION: Methadone Screen, U  Positive Cutoff: 150 ng/mL  Methodology: Immunoassay   TRAMADOL   Negative   Not Detected       Not Detected   Comment: Presumptive negative by immunoassay. Testing by mass  spectrometry is available on request.  INTERPRETIVE INFORMATION:Tramadol Screen, U  Positive Cutoff: 100 ng/mL  Methodology: Immunoassay   AMPHETAMINE   Not Detected   Not Detected       Not Detected   Comment: INTERPRETIVE INFORMATION:Amphetamine, U  Positive Cutoff: 50 ng/mL  Methodology: Mass  Spectrometry   METHAMPHETAMINE   Not Detected   Not Detected       Not Detected   Comment: INTERPRETIVE INFORMATION:Methamphetamine, U  Positive Cutoff: 200 ng/mL  Methodology: Mass Spectrometry   MDMA- ECSTASY   Not Detected   Not Detected       Not Detected   Comment: INTERPRETIVE INFORMATION:MDMA, U  Positive Cutoff: 200 ng/mL  Methodology: Mass Spectrometry   MDA   Not Detected   Not Detected       Not Detected   Comment: INTERPRETIVE INFORMATION:MDA, U  Positive Cutoff: 200 ng/mL  Methodology: Mass Spectrometry   MDEA- Mary   Not Detected   Not Detected       Not Detected   Comment: INTERPRETIVE INFORMATION:MDEA, U  Positive Cutoff: 200 ng/mL  Methodology: Mass Spectrometry   METHYLPHENIDATE   Not Detected   Not Detected       Not Detected   Comment: INTERPRETIVE INFORMATION:Methylphenidate, U  Positive Cutoff: 100 ng/mL  Methodology: Mass Spectrometry   PHENTERMINE   Not Detected   Not Detected       Not Detected   Comment: INTERPRETIVE INFORMATION:Phentermine, U  Positive Cutoff: 100 ng/mL  Methodology: Mass Spectrometry   BENZOYLECGONINE   Negative   Not Detected       Not Detected   Comment: Presumptive negative by immunoassay. Testing by mass  spectrometry is available on request.  INTERPRETIVE INFORMATION:Cocaine Screen, U  Positive Cutoff: 150 ng/mL  Methodology: Immunoassay   ALPRAZOLAM   Not Detected   Not Detected       Not Detected   Comment: INTERPRETIVE INFORMATION:Alprazolam, U  Positive Cutoff: 40 ng/mL  Methodology: Mass Spectrometry   ALPHA-OH-ALPRAZOLAM   Not Detected   Not Detected       Not Detected   Comment: INTERPRETIVE INFORMATION:Alpha-OH-Alprazolam, U  Positive Cutoff: 20 ng/mL  Methodology: Mass Spectrometry   CLONAZEPAM   Not Detected   Not Detected       Not Detected   Comment: INTERPRETIVE INFORMATION:Clonazepam, U  Positive Cutoff: 20 ng/mL  Methodology: Mass Spectrometry   7-AMINOCLONAZEPAM   Not Detected   Not Detected       Not Detected   Comment: INTERPRETIVE  INFORMATION:7-Aminoclonazepam, U  Positive Cutoff: 40 ng/mL  Methodology: Mass Spectrometry   DIAZEPAM   Not Detected   Not Detected       Not Detected   Comment: INTERPRETIVE INFORMATION:Diazepam, U  Positive Cutoff: 50 ng/mL  Methodology: Mass Spectrometry   NORDIAZEPAM   Not Detected   Not Detected       Not Detected   Comment: INTERPRETIVE INFORMATION:Nordiazepam, U  Positive Cutoff: 50 ng/mL  Methodology: Mass Spectrometry   OXAZEPAM   Not Detected   Not Detected       Not Detected   Comment: INTERPRETIVE INFORMATION:Oxazepam, U  Positive Cutoff: 50 ng/mL  Methodology: Mass Spectrometry   TEMAZEPAM   Not Detected   Not Detected       Not Detected   Comment: INTERPRETIVE INFORMATION:Temazepam, U  Positive Cutoff: 50 ng/mL  Methodology: Mass Spectrometry   Lorazepam   Not Detected   Not Detected       Not Detected   Comment: INTERPRETIVE INFORMATION:Lorazepam, U  Positive Cutoff: 60 ng/mL  Methodology: Mass Spectrometry   MIDAZOLAM   Not Detected   Not Detected       Not Detected   Comment: INTERPRETIVE INFORMATION:Midazolam, U  Positive Cutoff: 20 ng/mL  Methodology: Mass Spectrometry   ZOLPIDEM   Not Detected   Not Detected       Not Detected   Comment: INTERPRETIVE INFORMATION:Zolpidem, U  Positive Cutoff: 20 ng/mL  Methodology: Mass Spectrometry   BARBITURATES   Negative   Not Detected       Not Detected   Comment: Presumptive negative by immunoassay. Testing by mass  spectrometry is available on request.  INTERPRETIVE INFORMATION:Barbiturates Screen, U  Positive Cutoff: 200 ng/mL  Methodology: Immunoassay   Creatinine, Urine 20.0 - 400.0 mg/dL <20.0 24.0 R 21.1 32.4 R 22.8 R 89.0 R 62.7   Comment: Creatinine <20 mg/dL is consistent with a dilute urine  specimen. Recollection to obtain a more concentrated  specimen, such as a first morning void, may be considered  if unexpected negative urine drug screening results were  obtained.   ETHYL GLUCURONIDE   Negative   Not Detected       Not Detected   Comment:  Presumptive negative by immunoassay. Testing by mass  spectrometry is available on request.  INTERPRETIVE INFORMATION:Ethyl Glucuronide Screen, U  Positive Cutoff: 500 ng/mL  Methodology: Immunoassay   MARIJUANA METABOLITE   Negative   Not Detected CM       Not Detected   Comment: Presumptive negative by immunoassay. Testing by mass  spectrometry is available on request.  INTERPRETIVE INFORMATION: THC (Cannabinoids) Screen, U  Positive Cutoff: 50 ng/mL  Methodology: Immunoassay   PCP   Negative   Not Detected       Not Detected   Comment: Presumptive negative by immunoassay. Testing by mass  spectrometry is available on request.  INTERPRETIVE INFORMATION:Phencyclidine Screen, U  Positive Cutoff: 25 ng/mL  Methodology: Immunoassay   CARISOPRODOL   Negative   Not Detected CM       Not Detected CM   Comment: Presumptive negative by immunoassay. Testing by mass  spectrometry is available on request.  INTERPRETIVE INFORMATION: Carisoprodol Screen, U  Positive Cutoff: 100 ng/mL  Methodology: Immunoassay  The carisoprodol immunoassay has cross-reactivity to  carisoprodol and meprobamate.   Naloxone   Not Detected   Not Detected       Not Detected   Comment: INTERPRETIVE INFORMATION:Naloxone, U  Positive Cutoff: 100 ng/mL  Methodology: Mass Spectrometry   Gabapentin   Not Detected   Not Detected       Not Detected   Comment: INTERPRETIVE INFORMATION:Gabapentin, U  Positive Cutoff: 3,000 ng/mL  Methodology: Mass Spectrometry   Pregabalin   Present   Present       Present   Comment: INTERPRETIVE INFORMATION:Pregabalin, U  Positive Cutoff: 3,000 ng/mL  Methodology: Mass Spectrometry   Alpha-OH-Midazolam   Not Detected   Not Detected       Not Detected   Comment: INTERPRETIVE INFORMATION:Alpha-OH-Midazolam, U  Positive Cutoff: 20 ng/mL  Methodology: Mass Spectrometry   Zolpidem Metabolite   Not Detected   Not Detected       Not Detected   Comment: INTERPRETIVE INFORMATION:Zolpidem Metabolite, U  Positive Cutoff: 100  ng/mL  Methodology: Mass Spectrometry   PAIN MANAGEMENT DRUG PANEL   See Below   See Below CM       See Below CM

## 2024-06-12 NOTE — TELEPHONE ENCOUNTER
No care due was identified.  [facility] Embedded Care Due Messages. Reference number: [id number].   6/12/2024 4:09:44 PM CDT

## 2024-06-13 ENCOUNTER — TELEPHONE (OUTPATIENT)
Dept: INTERNAL MEDICINE | Facility: CLINIC | Age: 79
End: 2024-06-13
Payer: MEDICARE

## 2024-06-13 NOTE — TELEPHONE ENCOUNTER
----- Message from Chun Zapata MD sent at 6/13/2024  4:07 PM CDT -----  Nephrology back in with [facility]

## 2024-06-14 ENCOUNTER — TELEPHONE (OUTPATIENT)
Dept: PAIN MEDICINE | Facility: CLINIC | Age: 79
End: 2024-06-14
Payer: MEDICARE

## 2024-06-14 NOTE — TELEPHONE ENCOUNTER
----- Message from Vlad Weller sent at 6/14/2024  1:54 PM CDT -----  Name of Who is Calling:CAYLA GOODEN [[id number]]                   What is the request in detail: PT wants to know if they can come in early for the procedure please assist                   Can the clinic reply by [facility]: No                   What Number to Call Back if not in [facility]: [phone]

## 2024-06-14 NOTE — TELEPHONE ENCOUNTER
Staff called and explained that the procedure may need to stay at 1:30, due to  not being in clinic on that morning.

## 2024-06-20 ENCOUNTER — OFFICE VISIT (OUTPATIENT)
Dept: INTERNAL MEDICINE | Facility: CLINIC | Age: 79
End: 2024-06-20
Payer: MEDICARE

## 2024-06-20 ENCOUNTER — HOSPITAL ENCOUNTER (OUTPATIENT)
Dept: RADIOLOGY | Facility: OTHER | Age: 79
Discharge: HOME | End: 2024-06-20
Attending: STUDENT IN AN ORGANIZED HEALTH CARE EDUCATION/TRAINING PROGRAM
Payer: MEDICARE

## 2024-06-20 ENCOUNTER — HOSPITAL ENCOUNTER (INPATIENT)
Facility: OTHER | Age: 79
LOS: 5 days | Discharge: NURSING, REHAB OR LONG-TERM CARE | DRG: 189 | End: 2024-06-26
Attending: EMERGENCY MEDICINE | Admitting: HOSPITALIST
Payer: MEDICARE

## 2024-06-20 VITALS
OXYGEN SATURATION: 92 % | DIASTOLIC BLOOD PRESSURE: 54 MMHG | HEART RATE: 91 BPM | HEIGHT: 60 IN | SYSTOLIC BLOOD PRESSURE: 104 MMHG | BODY MASS INDEX: 38.67 KG/M2

## 2024-06-20 DIAGNOSIS — R09.02 HYPOXIA: ICD-10-CM

## 2024-06-20 DIAGNOSIS — E11.65 TYPE 2 DIABETES MELLITUS WITH HYPERGLYCEMIA, WITH LONG-TERM CURRENT USE OF INSULIN: Chronic | ICD-10-CM

## 2024-06-20 DIAGNOSIS — I50.43 ACUTE ON CHRONIC COMBINED SYSTOLIC AND DIASTOLIC CONGESTIVE HEART FAILURE: Primary | ICD-10-CM

## 2024-06-20 DIAGNOSIS — I50.32 CHRONIC DIASTOLIC (CONGESTIVE) HEART FAILURE: ICD-10-CM

## 2024-06-20 DIAGNOSIS — R06.02 SHORTNESS OF BREATH: ICD-10-CM

## 2024-06-20 DIAGNOSIS — R09.02 OXYGEN DECREASE: ICD-10-CM

## 2024-06-20 DIAGNOSIS — R00.0 TACHYCARDIA: ICD-10-CM

## 2024-06-20 DIAGNOSIS — J96.02 ACUTE RESPIRATORY FAILURE WITH HYPOXIA AND HYPERCAPNIA: ICD-10-CM

## 2024-06-20 DIAGNOSIS — Z79.4 TYPE 2 DIABETES MELLITUS WITH CHRONIC KIDNEY DISEASE, WITH LONG-TERM CURRENT USE OF INSULIN, UNSPECIFIED CKD STAGE: ICD-10-CM

## 2024-06-20 DIAGNOSIS — N18.4 STAGE 4 CHRONIC KIDNEY DISEASE: Primary | ICD-10-CM

## 2024-06-20 DIAGNOSIS — R06.02 SOB (SHORTNESS OF BREATH): ICD-10-CM

## 2024-06-20 DIAGNOSIS — Z79.4 TYPE 2 DIABETES MELLITUS WITH HYPERGLYCEMIA, WITH LONG-TERM CURRENT USE OF INSULIN: Chronic | ICD-10-CM

## 2024-06-20 DIAGNOSIS — Z91.148 NONCOMPLIANCE WITH MEDICATION REGIMEN: ICD-10-CM

## 2024-06-20 DIAGNOSIS — G89.4 CHRONIC PAIN SYNDROME: ICD-10-CM

## 2024-06-20 DIAGNOSIS — E11.22 TYPE 2 DIABETES MELLITUS WITH CHRONIC KIDNEY DISEASE, WITH LONG-TERM CURRENT USE OF INSULIN, UNSPECIFIED CKD STAGE: ICD-10-CM

## 2024-06-20 DIAGNOSIS — I10 ESSENTIAL HYPERTENSION: Chronic | ICD-10-CM

## 2024-06-20 DIAGNOSIS — R94.31 ABNORMAL ELECTROCARDIOGRAM: ICD-10-CM

## 2024-06-20 DIAGNOSIS — F41.1 GENERALIZED ANXIETY DISORDER: ICD-10-CM

## 2024-06-20 DIAGNOSIS — D64.9 ANEMIA, UNSPECIFIED TYPE: ICD-10-CM

## 2024-06-20 DIAGNOSIS — J96.01 ACUTE HYPOXEMIC RESPIRATORY FAILURE: ICD-10-CM

## 2024-06-20 DIAGNOSIS — J96.01 ACUTE RESPIRATORY FAILURE WITH HYPOXIA AND HYPERCAPNIA: ICD-10-CM

## 2024-06-20 DIAGNOSIS — E78.01 FAMILIAL HYPERCHOLESTEROLEMIA: Chronic | ICD-10-CM

## 2024-06-20 LAB
ALBUMIN SERPL BCP-MCNC: 3.5 G/DL (ref 3.5–5.2)
ALP SERPL-CCNC: 53 U/L (ref 55–135)
ALT SERPL W/O P-5'-P-CCNC: 15 U/L (ref 10–44)
ANION GAP SERPL CALC-SCNC: 16 MMOL/L (ref 8–16)
AST SERPL-CCNC: 22 U/L (ref 10–40)
BACTERIA #/AREA URNS HPF: ABNORMAL /HPF
BASOPHILS # BLD AUTO: 0.07 K/UL (ref 0–0.2)
BASOPHILS NFR BLD: 0.6 % (ref 0–1.9)
BILIRUB SERPL-MCNC: 0.1 MG/DL (ref 0.1–1)
BILIRUB UR QL STRIP: NEGATIVE
BNP SERPL-MCNC: 132 PG/ML (ref 0–99)
BUN SERPL-MCNC: 33 MG/DL (ref 8–23)
CALCIUM SERPL-MCNC: 9.2 MG/DL (ref 8.7–10.5)
CHLORIDE SERPL-SCNC: 95 MMOL/L (ref 95–110)
CLARITY UR: CLEAR
CO2 SERPL-SCNC: 22 MMOL/L (ref 23–29)
COLOR UR: COLORLESS
CREAT SERPL-MCNC: 1.9 MG/DL (ref 0.5–1.4)
DIFFERENTIAL METHOD BLD: ABNORMAL
EOSINOPHIL # BLD AUTO: 0.7 K/UL (ref 0–0.5)
EOSINOPHIL NFR BLD: 6 % (ref 0–8)
ERYTHROCYTE [DISTWIDTH] IN BLOOD BY AUTOMATED COUNT: 15.9 % (ref 11.5–14.5)
EST. GFR  (NO RACE VARIABLE): 27 ML/MIN/1.73 M^2
GLUCOSE SERPL-MCNC: 382 MG/DL (ref 70–110)
GLUCOSE UR QL STRIP: ABNORMAL
HCT VFR BLD AUTO: 32 % (ref 37–48.5)
HGB BLD-MCNC: 10.4 G/DL (ref 12–16)
HGB UR QL STRIP: NEGATIVE
HYALINE CASTS #/AREA URNS LPF: 0 /LPF
IMM GRANULOCYTES # BLD AUTO: 0.08 K/UL (ref 0–0.04)
IMM GRANULOCYTES NFR BLD AUTO: 0.7 % (ref 0–0.5)
KETONES UR QL STRIP: NEGATIVE
LEUKOCYTE ESTERASE UR QL STRIP: ABNORMAL
LYMPHOCYTES # BLD AUTO: 3 K/UL (ref 1–4.8)
LYMPHOCYTES NFR BLD: 25.7 % (ref 18–48)
MCH RBC QN AUTO: 28 PG (ref 27–31)
MCHC RBC AUTO-ENTMCNC: 32.5 G/DL (ref 32–36)
MCV RBC AUTO: 86 FL (ref 82–98)
MICROSCOPIC COMMENT: ABNORMAL
MONOCYTES # BLD AUTO: 0.9 K/UL (ref 0.3–1)
MONOCYTES NFR BLD: 7.9 % (ref 4–15)
NEUTROPHILS # BLD AUTO: 6.9 K/UL (ref 1.8–7.7)
NEUTROPHILS NFR BLD: 59.1 % (ref 38–73)
NITRITE UR QL STRIP: NEGATIVE
NRBC BLD-RTO: 0 /100 WBC
OHS QRS DURATION: 82 MS
OHS QTC CALCULATION: 462 MS
PH UR STRIP: 6 [PH] (ref 5–8)
PLATELET # BLD AUTO: 276 K/UL (ref 150–450)
PMV BLD AUTO: 11.2 FL (ref 9.2–12.9)
POTASSIUM SERPL-SCNC: 5 MMOL/L (ref 3.5–5.1)
PROT SERPL-MCNC: 7.1 G/DL (ref 6–8.4)
PROT UR QL STRIP: NEGATIVE
RBC # BLD AUTO: 3.72 M/UL (ref 4–5.4)
SODIUM SERPL-SCNC: 133 MMOL/L (ref 136–145)
SP GR UR STRIP: 1.01 (ref 1–1.03)
SQUAMOUS #/AREA URNS HPF: 2 /HPF
TROPONIN I SERPL DL<=0.01 NG/ML-MCNC: 0.01 NG/ML (ref 0–0.03)
TROPONIN I SERPL DL<=0.01 NG/ML-MCNC: 0.01 NG/ML (ref 0–0.03)
URN SPEC COLLECT METH UR: ABNORMAL
UROBILINOGEN UR STRIP-ACNC: NEGATIVE EU/DL
WBC # BLD AUTO: 11.59 K/UL (ref 3.9–12.7)
WBC #/AREA URNS HPF: 7 /HPF (ref 0–5)
YEAST URNS QL MICRO: ABNORMAL

## 2024-06-20 PROCEDURE — 83880 ASSAY OF NATRIURETIC PEPTIDE: CPT | Performed by: NURSE PRACTITIONER

## 2024-06-20 PROCEDURE — 96375 TX/PRO/DX INJ NEW DRUG ADDON: CPT

## 2024-06-20 PROCEDURE — G0378 HOSPITAL OBSERVATION PER HR: HCPCS

## 2024-06-20 PROCEDURE — 71046 X-RAY EXAM CHEST 2 VIEWS: CPT | Mod: 26,,, | Performed by: RADIOLOGY

## 2024-06-20 PROCEDURE — 99214 OFFICE O/P EST MOD 30 MIN: CPT | Mod: PBBFAC,25 | Performed by: STUDENT IN AN ORGANIZED HEALTH CARE EDUCATION/TRAINING PROGRAM

## 2024-06-20 PROCEDURE — 94640 AIRWAY INHALATION TREATMENT: CPT | Mod: XB

## 2024-06-20 PROCEDURE — 99215 OFFICE O/P EST HI 40 MIN: CPT | Mod: S$PBB,,, | Performed by: STUDENT IN AN ORGANIZED HEALTH CARE EDUCATION/TRAINING PROGRAM

## 2024-06-20 PROCEDURE — 94761 N-INVAS EAR/PLS OXIMETRY MLT: CPT

## 2024-06-20 PROCEDURE — 25000242 PHARM REV CODE 250 ALT 637 W/ HCPCS: Performed by: EMERGENCY MEDICINE

## 2024-06-20 PROCEDURE — 85025 COMPLETE CBC W/AUTO DIFF WBC: CPT | Performed by: NURSE PRACTITIONER

## 2024-06-20 PROCEDURE — G2211 COMPLEX E/M VISIT ADD ON: HCPCS | Mod: S$PBB,,, | Performed by: STUDENT IN AN ORGANIZED HEALTH CARE EDUCATION/TRAINING PROGRAM

## 2024-06-20 PROCEDURE — 99999 PR PBB SHADOW E&M-EST. PATIENT-LVL IV: CPT | Mod: PBBFAC,,, | Performed by: STUDENT IN AN ORGANIZED HEALTH CARE EDUCATION/TRAINING PROGRAM

## 2024-06-20 PROCEDURE — 71046 X-RAY EXAM CHEST 2 VIEWS: CPT | Mod: TC,FY

## 2024-06-20 PROCEDURE — 63600175 PHARM REV CODE 636 W HCPCS: Performed by: EMERGENCY MEDICINE

## 2024-06-20 PROCEDURE — 93005 ELECTROCARDIOGRAM TRACING: CPT | Mod: PBBFAC | Performed by: INTERNAL MEDICINE

## 2024-06-20 PROCEDURE — 93010 ELECTROCARDIOGRAM REPORT: CPT | Mod: S$PBB,,, | Performed by: INTERNAL MEDICINE

## 2024-06-20 PROCEDURE — 81000 URINALYSIS NONAUTO W/SCOPE: CPT | Performed by: NURSE PRACTITIONER

## 2024-06-20 PROCEDURE — 99285 EMERGENCY DEPT VISIT HI MDM: CPT | Mod: 25,27

## 2024-06-20 PROCEDURE — 84484 ASSAY OF TROPONIN QUANT: CPT | Mod: 91 | Performed by: NURSE PRACTITIONER

## 2024-06-20 PROCEDURE — 80053 COMPREHEN METABOLIC PANEL: CPT | Performed by: NURSE PRACTITIONER

## 2024-06-20 RX ORDER — SODIUM CHLORIDE 0.9 % (FLUSH) 0.9 %
10 SYRINGE (ML) INJECTION EVERY 8 HOURS
Status: DISCONTINUED | OUTPATIENT
Start: 2024-06-21 | End: 2024-06-26 | Stop reason: HOSPADM

## 2024-06-20 RX ORDER — IPRATROPIUM BROMIDE AND ALBUTEROL SULFATE 2.5; .5 MG/3ML; MG/3ML
3 SOLUTION RESPIRATORY (INHALATION)
Status: COMPLETED | OUTPATIENT
Start: 2024-06-20 | End: 2024-06-20

## 2024-06-20 RX ORDER — NALOXONE HCL 0.4 MG/ML
0.02 VIAL (ML) INJECTION
Status: DISCONTINUED | OUTPATIENT
Start: 2024-06-21 | End: 2024-06-26 | Stop reason: HOSPADM

## 2024-06-20 RX ORDER — ACETAMINOPHEN 325 MG/1
650 TABLET ORAL EVERY 6 HOURS PRN
Status: DISCONTINUED | OUTPATIENT
Start: 2024-06-21 | End: 2024-06-25

## 2024-06-20 RX ORDER — AMOXICILLIN 250 MG
1 CAPSULE ORAL 2 TIMES DAILY PRN
Status: DISCONTINUED | OUTPATIENT
Start: 2024-06-21 | End: 2024-06-26 | Stop reason: HOSPADM

## 2024-06-20 RX ORDER — METHYLPREDNISOLONE SOD SUCC 125 MG
125 VIAL (EA) INJECTION
Status: COMPLETED | OUTPATIENT
Start: 2024-06-20 | End: 2024-06-20

## 2024-06-20 RX ORDER — TALC
6 POWDER (GRAM) TOPICAL NIGHTLY PRN
Status: DISCONTINUED | OUTPATIENT
Start: 2024-06-20 | End: 2024-06-26 | Stop reason: HOSPADM

## 2024-06-20 RX ORDER — SODIUM CHLORIDE 0.9 % (FLUSH) 0.9 %
10 SYRINGE (ML) INJECTION
Status: DISCONTINUED | OUTPATIENT
Start: 2024-06-20 | End: 2024-06-25

## 2024-06-20 RX ADMIN — IPRATROPIUM BROMIDE AND ALBUTEROL SULFATE 3 ML: 2.5; .5 SOLUTION RESPIRATORY (INHALATION) at 08:06

## 2024-06-20 RX ADMIN — IPRATROPIUM BROMIDE AND ALBUTEROL SULFATE 3 ML: 2.5; .5 SOLUTION RESPIRATORY (INHALATION) at 05:06

## 2024-06-20 RX ADMIN — METHYLPREDNISOLONE SODIUM SUCCINATE 125 MG: 125 INJECTION, POWDER, FOR SOLUTION INTRAMUSCULAR; INTRAVENOUS at 10:06

## 2024-06-20 NOTE — ED PROVIDER NOTES
"Encounter Date: 2024       History     Chief Complaint   Patient presents with    Shortness of Breath     Feels SOB "when it rains outside", sent from PCP for evaluation for low SpO2. Hx of COPD, no home O2 use per pt.      Seen by physician at 2:55PM:    Patient is a 79-year-old female who presents to the emergency department with shortness of breath.  Patient states that she has been feeling short of breath all week, usually triggered by the rain.  She states that normally she does have some dyspnea with exertion but her dyspnea is now coming at rest and early in her exertion with some associated chest tightness.  She denies any fevers/chills.  Denies any cough.  She also has noted increased lower extremity swelling.  She was seen by her Primary Care Physician today for a follow up appointment who noted that she was hypoxic and sent her here for further evaluation.        Review of patient's allergies indicates:   Allergen Reactions    Bleach (sodium hypochlorite) Shortness Of Breath    Nitrofurantoin macrocrystalline Anaphylaxis    Lipitor [atorvastatin] Diarrhea and Rash    Nsaids (non-steroidal anti-inflammatory drug)      Tolerates aspirin      Pcn [penicillins]     Toradol [ketorolac]      Past Medical History:   Diagnosis Date    Arthritis     Back pain     Cancer     ovarian    Cervical cancer     Coronary artery disease     Depression     Diabetes mellitus     Fibromyalgia     Heart attack     History of MI (myocardial infarction)     Hyperlipidemia     Hypertension     Lupus     Stroke     slight left sided weakness     Past Surgical History:   Procedure Laterality Date    APPENDECTOMY       SECTION      2    CORONARY ANGIOGRAPHY N/A 2022    Procedure: ANGIOGRAM, CORONARY ARTERY;  Surgeon: Jose L Méndez MD;  Location: [facility] CATH LAB;  Service: Cardiology;  Laterality: N/A;    HYSTERECTOMY      with USO for cervical cancer    INJECTION OF ANESTHETIC AGENT AROUND NERVE Bilateral " 05/05/2021    Procedure: BLOCK, NERVE, SYMPATHIC;  Surgeon: Holden Pereira MD;  Location: [facility] PAIN MGT;  Service: Pain Management;  Laterality: Bilateral;    INJECTION OF ANESTHETIC AGENT AROUND NERVE N/A 08/25/2021    Procedure: BLOCK, NERVE, SYMPATHETIC  need consent;  Surgeon: Holden Pereira MD;  Location: [facility] PAIN MGT;  Service: Pain Management;  Laterality: N/A;    VT EVAL,SWALLOW FUNCTION,CINE/VIDEO RECORD  09/09/2021         TONSILLECTOMY      TRIAL OF SPINAL CORD NERVE STIMULATOR N/A 3/8/2023    Procedure: LUMBAR SPINAL CORD STIMULATOR TRIAL NEVRO REP PATIENT STATES SHE NO LONGER TAKES PLAVIX;  Surgeon: Holden Pereira MD;  Location: [facility] PAIN MGT;  Service: Pain Management;  Laterality: N/A;     Family History   Problem Relation Name Age of Onset    COPD Mother      Lupus Mother      Hernia Mother      Uterine cancer Mother          vs cervical cancer    Ovarian cancer Mother      Diabetes Father      Coronary artery disease Father      Colon cancer Maternal Grandmother          in her 50's     Social History     Tobacco Use    Smoking status: Former     Current packs/day: 0.00     Types: Cigarettes     Quit date: 11/2020     Years since quitting: 3.6     Passive exposure: Past    Smokeless tobacco: Never   Substance Use Topics    Alcohol use: Not Currently     Comment: occasionally    Drug use: Yes     Types: Hydrocodone     Comment: three times a day     Review of Systems   Constitutional:  Negative for chills and fever.   HENT:  Negative for congestion and rhinorrhea.    Respiratory:  Positive for chest tightness and shortness of breath.    Cardiovascular:  Positive for chest pain and leg swelling. Negative for palpitations.   Gastrointestinal:  Negative for abdominal pain, diarrhea, nausea and vomiting.   Genitourinary:  Negative for dysuria and flank pain.   Musculoskeletal:  Negative for back pain and neck pain.   Skin:  Negative for color change and wound.   Neurological:  Negative for  dizziness and headaches.       Physical Exam     Initial Vitals [06/20/24 1353]   BP Pulse Resp Temp SpO2   133/62 83 18 98 °F (36.7 °C) 95 %      MAP       --         Physical Exam    Nursing note and vitals reviewed.  Constitutional: She appears well-developed and well-nourished.   HENT:   Head: Normocephalic and atraumatic.   Eyes: Conjunctivae are normal.   Neck: Neck supple.   Normal range of motion.  Cardiovascular:  Normal rate, regular rhythm and normal heart sounds.           2+ DP/PT pulses bilaterally   Pulmonary/Chest: No respiratory distress. She has wheezes. She has no rales.   Decreased breath sounds at the bases with scant wheezes.     Abdominal: Abdomen is soft. Bowel sounds are normal. She exhibits no distension. There is no abdominal tenderness. There is no rebound.   Musculoskeletal:         General: Edema present. Normal range of motion.      Cervical back: Normal range of motion and neck supple.      Comments: 1+ pitting edema to the lower extremities bilaterally.       Neurological: She is alert and oriented to person, place, and time.   Skin: Skin is warm and dry. Capillary refill takes less than 2 seconds.         ED Course   Procedures  Labs Reviewed   CBC W/ AUTO DIFFERENTIAL - Abnormal; Notable for the following components:       Result Value    RBC 3.72 (*)     Hemoglobin 10.4 (*)     Hematocrit 32.0 (*)     RDW 15.9 (*)     Immature Granulocytes 0.7 (*)     Immature Grans (Abs) 0.08 (*)     Eos # 0.7 (*)     All other components within normal limits   COMPREHENSIVE METABOLIC PANEL - Abnormal; Notable for the following components:    Sodium 133 (*)     CO2 22 (*)     Glucose 382 (*)     BUN 33 (*)     Creatinine 1.9 (*)     Alkaline Phosphatase 53 (*)     eGFR 27 (*)     All other components within normal limits   B-TYPE NATRIURETIC PEPTIDE - Abnormal; Notable for the following components:     (*)     All other components within normal limits   TROPONIN I   TROPONIN I    URINALYSIS, REFLEX TO URINE CULTURE     EKG Readings: (Independently Interpreted)   2:07PM: Rate of 82.  Normal sinus rhythm.  Normal axis.  Normal intervals.  No ST or ischemic changes.       Imaging Results              X-Ray Chest 1 View (Final result)  Result time 06/20/24 14:45:39   Procedure changed from X-Ray Chest AP Portable     Final result by Karon Pennington MD (06/20/24 14:45:39)                   Impression:      No acute cardiopulmonary process seen.      Electronically signed by: Karon Pennington  Date:    06/20/2024  Time:    14:45               Narrative:    EXAMINATION:  XR CHEST 1 VIEW    CLINICAL HISTORY:  SOB;    TECHNIQUE:  Single frontal view of the chest was performed.    COMPARISON:  06/20/2024    FINDINGS:  Lungs are well expanded.  No acute consolidation, pleural effusion, or pneumothorax.    Cardiac silhouette is normal in size when accounting for technique.  Calcified plaque lines arch                                    X-Rays:   Independently Interpreted Readings:   Chest X-Ray: Trachea midline.  No cardiomegaly.  No effusion, infiltrate, edema     Medications   methylPREDNISolone sodium succinate injection 125 mg (has no administration in time range)   albuterol-ipratropium 2.5 mg-0.5 mg/3 mL nebulizer solution 3 mL (3 mLs Nebulization Given 6/20/24 1734)   albuterol-ipratropium 2.5 mg-0.5 mg/3 mL nebulizer solution 3 mL (3 mLs Nebulization Given 6/20/24 2012)     Medical Decision Making  2:55PM:  Patient is a 79-year-old female who presents to the emergency department with shortness of breath, hypoxia, dyspnea on exertion.  Patient appears well, nontoxic.  She is mildly hypoxic at rest at 91%.  Will plan for labs, imaging, nebs, will continue to follow and reassess.    Amount and/or Complexity of Data Reviewed  External Data Reviewed: notes.  Labs: ordered. Decision-making details documented in ED Course.  Radiology: ordered and independent interpretation performed.  Decision-making details documented in ED Course.  ECG/medicine tests: ordered and independent interpretation performed. Decision-making details documented in ED Course.  Discussion of management or test interpretation with external provider(s): Hospital medicine per MDM    Risk  Prescription drug management.    9:48 PM:  Patient feeling much better after multiple rounds of neb treatments.  Her baseline O2 sat has increased.  However upon ambulation, patient is still dropped to the mid 80s.  Her labs and chest x-ray are otherwise unremarkable.  Will plan for admission for further observation and management given the persistent hypoxia.  I discussed the patient with Marifer Boudreaux PA.-C, who is agreeable to plan for admission.                                       Clinical Impression:  Final diagnoses:  [R06.02] SOB (shortness of breath) (Primary)          ED Disposition Condition    Observation Stable                Jazzmine Martinez MD  06/20/24 4752

## 2024-06-20 NOTE — PROGRESS NOTES
[facility]    Subjective:      Patient ID: Indira Waters is a 79 y.o. female.    Chief Complaint: Hypertension (F/u)      History was obtained from the patient and supplemented through chart review.  This pt is known to me.    HPI:    Patient is a 79 y.o. female w/pmhx including DM, HTN, HLD, CAD, fibromyalgia presents for routinely scheduled visit but is short of breath today with accessory muscle usage and clif    Pt and daughter decline us setting up follow-up appts because of transportation concerns and scheduling needs.    Visit today, patient initially declined going to the emergency room. agreed to lab and imaging work up.  I called pt's daughter twice with no answer and staff advised they go to ED.    DM  9.5 6/5/2024  Doing better with diet and med compliance, needs to return to endocinrology and diabetes education  Needs CGM, delays with insurance    Severely deconditioned  Exercise encouraged, movement difficult, encouraged, using exercise trampoline but has not been able this week    CAD\CHF HFpEF  Followed by Dr. Méndez  Cardiac Cath 5/6/2022 with significant blockages   Taking ASA 81, plavix  Not taking statin due to vasculitis thought to be 2/2 statin; on repatha   Nitro prn  Return to cardiology  Switch furosemide to torsemide due to low albumin, fluid overload   Today BNP is moderately elevated 143    Normocytic Anemia  Family history of colon cancer in grandmother  Getting additional studies    Fibromyalgia   Chronic Pain Syndrome worse congratulations     Osteoporosis  Did not tolerate bisphosphonate in the past  AE dicussed, On forteo with endocrinology    HTN  Procardia 30 BID, losartan 50 mg daily?, clonidine 0.1 mg BID, toprol 100 mg BID    CHF   Grade II DD  Lasix 40 mg daily  Doing well    Hx of Drug induced Vasculitis  Thought to be 2/2 to atorvastatin, switched to repatha q 14 days    CKD Stage 3b/4  5 referrals 5 needed to get pt to nephro 12/2022, but then did not  return   Avoid nsaids, nephrotoxic meds  Also with constipation    Depression  zoloft 100 mg daily   Stable though not not feeling well    History of cervical cancer at about age 31, never a problem later in life  S/p hyst with 1 ovary remaining  Saw urogyn 2023    Wt Readings from Last 15 Encounters:   24 90.7 kg (200 lb)   24 89.8 kg (198 lb)   05/15/24 89.9 kg (198 lb 3.2 oz)   05/10/24 84 kg (185 lb 3 oz)   24 84 kg (185 lb 3 oz)   24 84.6 kg (186 lb 8.2 oz)   24 84.6 kg (186 lb 9.6 oz)   24 90 kg (198 lb 6.6 oz)   23 88.5 kg (195 lb)   12/15/23 88.9 kg (195 lb 15.8 oz)   23 88.5 kg (195 lb 1.7 oz)   11/15/23 88.5 kg (195 lb)   09/15/23 90.3 kg (199 lb 1.2 oz)   23 88.6 kg (195 lb 5.2 oz)   23 88.6 kg (195 lb 6.4 oz)      Son and   on mothers' day    Lupus? Pt reports  STEVE normal 2022  Not treated   Pt thinks it is currently flared  STEVE normal 2022  C3 and C4 normal       Medical History  Past Medical History:   Diagnosis Date    Arthritis     Back pain     Cancer     ovarian    Cervical cancer     Coronary artery disease     Depression     Diabetes mellitus     Fibromyalgia     Heart attack     History of MI (myocardial infarction)     Hyperlipidemia     Hypertension     Lupus     Stroke     slight left sided weakness       Review of Systems   Constitutional:  Negative for activity change and unexpected weight change.   HENT:  Negative for hearing loss and rhinorrhea.    Eyes:  Negative for discharge and visual disturbance.   Respiratory:  Positive for shortness of breath. Negative for chest tightness and wheezing.    Cardiovascular:  Positive for leg swelling. Negative for chest pain and palpitations.   Gastrointestinal:  Negative for blood in stool, constipation, diarrhea and vomiting.   Endocrine: Negative for polydipsia and polyuria.   Genitourinary:  Negative for difficulty urinating, dysuria, hematuria  and menstrual problem.   Musculoskeletal:  Positive for arthralgias, joint swelling and neck pain.   Neurological:  Negative for weakness and headaches.   Psychiatric/Behavioral:  Negative for confusion and dysphoric mood.          Surgical hx, family hx, social hx   Have been reviewed    No current facility-administered medications for this visit.  No current outpatient medications on file.    Facility-Administered Medications Ordered in Other Visits:     acetaminophen tablet 650 mg, 650 mg, Oral, Q6H PRN, Marifer Boudreaux PA-C, 650 mg at 06/21/24 0043    albuterol sulfate nebulizer solution, 10 mg/hr, Nebulization, Continuous, Marifer Boudreaux PA-C, Last Rate: 2 mL/hr at 06/21/24 0207, 10 mg/hr at 06/21/24 0207    albuterol-ipratropium 2.5 mg-0.5 mg/3 mL nebulizer solution 3 mL, 3 mL, Nebulization, Q4H PRN, Marifer Boudreaux PA-ELISHA    albuterol-ipratropium 2.5 mg-0.5 mg/3 mL nebulizer solution 3 mL, 3 mL, Nebulization, Q4H, Marifer Boudreaux PA-ELISHA, 3 mL at 06/21/24 0759    allopurinoL tablet 300 mg, 300 mg, Oral, Daily, Marifer Boudreaux PA-C    aspirin EC tablet 81 mg, 81 mg, Oral, Daily, Marifer Boudreaux PA-C    atorvastatin tablet 40 mg, 40 mg, Oral, QHS, Marifer Boudreaux PA-C    cloNIDine tablet 0.1 mg, 0.1 mg, Oral, BID, Marifer Boudreaux PA-C    dextrose 10% bolus 125 mL 125 mL, 12.5 g, Intravenous, PRN, Marifer Boudreaux PA-C    dextrose 10% bolus 125 mL 125 mL, 12.5 g, Intravenous, PRN, Eula Loya MD    dextrose 10% bolus 250 mL 250 mL, 25 g, Intravenous, PRN, Marifer Boudreaux PA-C    dextrose 10% bolus 250 mL 250 mL, 25 g, Intravenous, PRN, Eula Loya MD    fluticasone propionate 50 mcg/actuation nasal spray 50 mcg, 1 spray, Each Nostril, Daily, Marifer Boudreaux PA-C    furosemide injection 80 mg, 80 mg, Intravenous, Once, Lio Rosales MD    heparin 25,000 units in dextrose 5% (100 units/ml) IV bolus from bag HIGH INTENSITY  nomogram - [facility], 80 Units/kg (Adjusted), Intravenous, Once, Marifer Boudreaux PA-ELISHA    heparin 25,000 units in dextrose 5% (100 units/ml) IV bolus from bag HIGH INTENSITY nomogram - [facility], 60 Units/kg (Adjusted), Intravenous, PRN, Marifer Boudreaux, PA-ELISHA    heparin 25,000 units in dextrose 5% (100 units/ml) IV bolus from bag HIGH INTENSITY nomogram - [facility], 30 Units/kg (Adjusted), Intravenous, PRN, Marifer Boudreaux, PA-ELISHA    heparin 25,000 units in dextrose 5% 250 mL (100 units/mL) infusion HIGH INTENSITY nomogram - [facility], 0-40 Units/kg/hr (Adjusted), Intravenous, Continuous, Marifer Boudreaux PA-C    HYDROcodone-acetaminophen  mg per tablet 1 tablet, 1 tablet, Oral, Q8H PRN, Marifer Boudreaux PA-C    insulin regular in 0.9 % NaCl 100 unit/100 mL (1 unit/mL) infusion, 0-52 Units/hr, Intravenous, Continuous, Eula Loya MD, Last Rate: 3 mL/hr at 06/21/24 0631, 3 Units/hr at 06/21/24 0631    losartan tablet 50 mg, 50 mg, Oral, Daily, Marifer Boudreaux PA-C    melatonin tablet 6 mg, 6 mg, Oral, Nightly PRN, JuanJazzmine MD    methocarbamoL tablet 500 mg, 500 mg, Oral, TID PRN, Marifer Boudreaux PA-C    metoclopramide HCl tablet 5 mg, 5 mg, Oral, QID (AC & HS), Marifer Boudreaux PA-C    metoprolol succinate (TOPROL-XL) 24 hr tablet 100 mg, 100 mg, Oral, Daily, Marifer Boudreaux PA-C    naloxone 0.4 mg/mL injection 0.02 mg, 0.02 mg, Intravenous, PRN, Marifer Boudreaux PA-C    NIFEdipine 24 hr tablet 30 mg, 30 mg, Oral, BID, Marifer Boudreaux PA-C    predniSONE tablet 40 mg, 40 mg, Oral, Daily, Marifer Boudreaux PA-C    senna-docusate 8.6-50 mg per tablet 1 tablet, 1 tablet, Oral, BID PRN, Marifer Boudreaux PA-C    sertraline tablet 100 mg, 100 mg, Oral, Daily, Marifer Boudreaux PA-C    sodium chloride 0.9% flush 10 mL, 10 mL, Intravenous, PRN, JuanJazzmine MD    sodium chloride 0.9% flush 10 mL, 10 mL, Intravenous, Q8H, Marifer Boudreaux  ANASTASIIA POMPA, 10 mL at 06/21/24 0633    sodium chloride 0.9% flush 3 mL, 3 mL, Intravenous, PRN, Marifer Boudreaux PA-C    Objective:        Body mass index is 38.67 kg/m².  Vitals:    06/20/24 1006   BP: (!) 104/54   Pulse: 91   SpO2: (!) 92%   Height: 5' (1.524 m)   PainSc:   7   PainLoc: Generalized             Physical Exam  Vitals and nursing note reviewed.   Constitutional:       General: She is not in acute distress.     Appearance: She is ill-appearing.   HENT:      Head: Normocephalic and atraumatic.   Eyes:      General: No scleral icterus.  Cardiovascular:      Rate and Rhythm: Normal rate and regular rhythm.      Heart sounds: Normal heart sounds.   Pulmonary:      Effort: Respiratory distress present.      Breath sounds: Rhonchi present.   Musculoskeletal:         General: No deformity.      Right lower leg: Edema (+3 mid shin) present.      Left lower leg: Edema (+3 mid shin) present.   Neurological:      Mental Status: She is alert and oriented to person, place, and time.   Psychiatric:         Behavior: Behavior normal.           Lab Results   Component Value Date    WBC 11.59 06/20/2024    HGB 10.4 (L) 06/20/2024    HCT 47 06/21/2024     06/20/2024    CHOL 197 06/05/2024    TRIG 348 (H) 06/05/2024    HDL 35 (L) 06/05/2024    ALT 15 06/20/2024    AST 22 06/20/2024     (L) 06/21/2024    K 5.3 (H) 06/21/2024    CL 95 06/21/2024    CREATININE 2.1 (H) 06/21/2024    BUN 35 (H) 06/21/2024    CO2 11 (L) 06/21/2024    TSH 2.059 06/05/2024    INR 1.0 01/31/2024    HGBA1C 9.5 (H) 06/05/2024       The ASCVD Risk score (Annamarie WEATHERS, et al., 2019) failed to calculate for the following reasons:    The patient has a prior MI or stroke diagnosis    Repatha    (Imaging have been independently reviewed)    Chest xray reivewed      Assessment:         1. Stage 4 chronic kidney disease    2. Familial hypercholesterolemia    3. Essential hypertension    4. Generalized anxiety disorder    5. Chronic pain  syndrome    6. Type 2 diabetes mellitus with chronic kidney disease, with long-term current use of insulin, unspecified CKD stage    7. Type 2 diabetes mellitus with hyperglycemia, with long-term current use of insulin    8. Anemia, unspecified type    9. Shortness of breath    10. Hypoxia    11. Chronic diastolic (congestive) heart failure    12. Oxygen decrease    13. Noncompliance with medication regimen              Plan:     Indira was seen today for hypertension.    Diagnoses and all orders for this visit:    Stage 4 chronic kidney disease  -     Comprehensive Metabolic Panel; Future    Familial hypercholesterolemia    Essential hypertension    Generalized anxiety disorder    Chronic pain syndrome    Type 2 diabetes mellitus with chronic kidney disease, with long-term current use of insulin, unspecified CKD stage    Type 2 diabetes mellitus with hyperglycemia, with long-term current use of insulin    Anemia, unspecified type  -     CBC Without Differential; Future    Shortness of breath  -     D-DIMER, QUANTITATIVE; Future  -     X-Ray Chest PA And Lateral; Future  -     IN OFFICE EKG 12-LEAD (to Muse)  -     SCHEDULED EKG 12-LEAD (to Muse); Future    Hypoxia    Chronic diastolic (congestive) heart failure  -     B-TYPE NATRIURETIC PEPTIDE; Future    Oxygen decrease  -     X-Ray Chest PA And Lateral; Future  -     IN OFFICE EKG 12-LEAD (to Muse)  -     SCHEDULED EKG 12-LEAD (to Muse); Future    Noncompliance with medication regimen      Pt and daughter decline us setting up follow-up appts because of their transportation and scheduling needs.    Follow up in about 2 months (around 8/20/2024) for breathing f/u. or sooner prn      Tests to Keep You Healthy    Eye Exam: SCHEDULED    40 min were used in chart review, evaluation and counseling of patient, documentation and review of results on same day of service    Pt sent to ED today due to SOB, decreased oxygen level, RANJEET    Visit today is associated with current  or anticipated ongoing medical care related to this patient's single serious condition/complex condition of diabetes, chest pain, shortness of breath. The patient will return to see me as these issues will be followed longitudinally.      All medications were reviewed including potential side effects and risks/benefits.  Pt was counseled to call back if anything worsens or if questions arise.    Chun Zapata MD  Family Medicine  [facility]  [address] [address]  Phone [phone]  Fax [phone]

## 2024-06-20 NOTE — Clinical Note
Diagnosis: SOB (shortness of breath) [537256]   Future Attending Provider: SERA WELLINGTON [[id number]]   Special Needs:: No Special Needs [1]

## 2024-06-20 NOTE — ED TRIAGE NOTES
Pt reporting SOB and L rib pain. Denies chest pain. Sent from PCP for low O2. Hx of COPD and CHF. Denies home oxygen use. O2 95% in ED.

## 2024-06-20 NOTE — FIRST PROVIDER EVALUATION
" Emergency Department TeleTriage Encounter Note      CHIEF COMPLAINT    Chief Complaint   Patient presents with    Shortness of Breath     Feels SOB "when it rains outside", sent from PCP for evaluation for low SpO2. Hx of COPD, no home O2 use per pt.        VITAL SIGNS   Initial Vitals [06/20/24 1353]   BP Pulse Resp Temp SpO2   133/62 83 18 98 °F (36.7 °C) 95 %      MAP       --            ALLERGIES    Review of patient's allergies indicates:   Allergen Reactions    Bleach (sodium hypochlorite) Shortness Of Breath    Nitrofurantoin macrocrystalline Anaphylaxis    Lipitor [atorvastatin] Diarrhea and Rash    Nsaids (non-steroidal anti-inflammatory drug)      Tolerates aspirin      Pcn [penicillins]     Toradol [ketorolac]        PROVIDER TRIAGE NOTE  Verbal consent for the teletriage evaluation was given by the patient at the start of the evaluation.  All efforts will be made to maintain patient's privacy during the evaluation.      This is a teletriage evaluation of a 79 y.o. female presenting to the ED with c/o SOB that is always like this when it rains.  MD sent to ED for further evaluation. Limited physical exam via telehealth: The patient is awake, alert, answering questions appropriately and is not in respiratory distress.  As the Teletriage provider, I performed an initial assessment and ordered appropriate labs and imaging studies, if any, to facilitate the patient's care once placed in the ED. Once a room is available, care and a full evaluation will be completed by an alternate ED provider.  Any additional orders and the final disposition will be determined by that provider.  All imaging and labs will not be followed-up by the Teletriage Team, including myself.          ORDERS  Labs Reviewed - No data to display    ED Orders (720h ago, onward)      Start Ordered     Status Ordering Provider    06/20/24 1406 06/20/24 1406  Vital signs  Every 15 min         Ordered SANDI JIMÉNEZ    06/20/24 1406 06/20/24 " 1406  Cardiac Monitoring - Adult  Continuous        Comments: Notify Physician If:    Ordered SANDI JIMÉNZE    06/20/24 1406 06/20/24 1406  Pulse Oximetry Continuous  Continuous         Ordered SANDI JIMÉNEZ    06/20/24 1406 06/20/24 1406  Diet NPO  Diet effective now         Ordered SANDI JIMÉNEZ    Unscheduled 06/20/24 1406  Saline lock IV  Once         Ordered SANDI JIMÉNEZ    Unscheduled 06/20/24 1406  EKG 12-lead  Once        Comments: Do not perform if previously done during this visit/ triage    Ordered SANDI JIMÉNEZ    Unscheduled 06/20/24 1406  CBC auto differential  STAT         Ordered SANDI JIMÉNEZ    Unscheduled 06/20/24 1406  Comprehensive metabolic panel  STAT         Ordered SANDI JIMÉNEZ    Unscheduled 06/20/24 1406  Troponin I #1  STAT         Ordered SANDI JIMÉNEZ    Unscheduled 06/20/24 1406  Troponin I #2  Once Timed         Ordered SANDI JIMÉNEZ    Unscheduled 06/20/24 1406  B-Type natriuretic peptide (BNP)  STAT         Ordered SANDI JIMÉNEZ    Unscheduled 06/20/24 1406  X-Ray Chest AP Portable  1 time imaging         Ordered SANDI JIMÉNEZ    Unscheduled 06/20/24 1406  Urinalysis, Reflex to Urine Culture Urine, Clean Catch  STAT         Ordered SANDI JIMÉNEZ              Virtual Visit Note: The provider triage portion of this emergency department evaluation and documentation was performed via [employer], a HIPAA-compliant telemedicine application, in concert with a tele-presenter in the room. A face to face patient evaluation with one of my colleagues will occur once the patient is placed in an emergency department room.      DISCLAIMER: This note was prepared with [employer] voice recognition transcription software. Garbled syntax, mangled pronouns, and other bizarre constructions may be attributed to that software system.

## 2024-06-21 ENCOUNTER — TELEPHONE (OUTPATIENT)
Dept: OPTOMETRY | Facility: CLINIC | Age: 79
End: 2024-06-21
Payer: MEDICAID

## 2024-06-21 PROBLEM — M06.9 RHEUMATOID ARTHRITIS, UNSPECIFIED: Status: ACTIVE | Noted: 2021-11-10

## 2024-06-21 PROBLEM — E66.812 CLASS 2 OBESITY WITH BODY MASS INDEX (BMI) OF 39.0 TO 39.9 IN ADULT: Status: ACTIVE | Noted: 2024-06-21

## 2024-06-21 PROBLEM — I69.354 HEMIPLEGIA AND HEMIPARESIS FOLLOWING CEREBRAL INFARCTION AFFECTING LEFT NON-DOMINANT SIDE: Status: ACTIVE | Noted: 2021-09-15

## 2024-06-21 PROBLEM — E11.10 DIABETIC KETOACIDOSIS WITHOUT COMA ASSOCIATED WITH TYPE 2 DIABETES MELLITUS: Status: ACTIVE | Noted: 2024-06-21

## 2024-06-21 PROBLEM — J96.02 ACUTE RESPIRATORY FAILURE WITH HYPOXIA AND HYPERCAPNIA: Status: ACTIVE | Noted: 2024-06-21

## 2024-06-21 PROBLEM — N18.30 STAGE 3 CHRONIC KIDNEY DISEASE: Status: ACTIVE | Noted: 2021-09-15

## 2024-06-21 PROBLEM — I50.43 ACUTE ON CHRONIC COMBINED SYSTOLIC AND DIASTOLIC CONGESTIVE HEART FAILURE: Status: ACTIVE | Noted: 2021-09-15

## 2024-06-21 PROBLEM — I50.33 ACUTE ON CHRONIC DIASTOLIC HEART FAILURE: Status: ACTIVE | Noted: 2021-09-15

## 2024-06-21 PROBLEM — E66.9 CLASS 2 OBESITY WITH BODY MASS INDEX (BMI) OF 39.0 TO 39.9 IN ADULT: Status: ACTIVE | Noted: 2024-06-21

## 2024-06-21 PROBLEM — J96.01 ACUTE RESPIRATORY FAILURE WITH HYPOXIA AND HYPERCAPNIA: Status: ACTIVE | Noted: 2024-06-21

## 2024-06-21 PROBLEM — F41.1 GENERALIZED ANXIETY DISORDER: Status: ACTIVE | Noted: 2021-09-15

## 2024-06-21 PROBLEM — R06.02 SHORTNESS OF BREATH: Status: ACTIVE | Noted: 2024-06-21

## 2024-06-21 LAB
ADENOVIRUS: NOT DETECTED
ALLENS TEST: ABNORMAL
ALLENS TEST: ABNORMAL
ANION GAP SERPL CALC-SCNC: 12 MMOL/L (ref 8–16)
ANION GAP SERPL CALC-SCNC: 15 MMOL/L (ref 8–16)
ANION GAP SERPL CALC-SCNC: 15 MMOL/L (ref 8–16)
ANION GAP SERPL CALC-SCNC: 22 MMOL/L (ref 8–16)
ANION GAP SERPL CALC-SCNC: 23 MMOL/L (ref 8–16)
APTT PPP: 24.4 SEC (ref 21–32)
AV INDEX (PROSTH): 0.98
AV MEAN GRADIENT: 6 MMHG
AV PEAK GRADIENT: 9 MMHG
AV VALVE AREA BY VELOCITY RATIO: 2.39 CM²
AV VALVE AREA: 2.58 CM²
AV VELOCITY RATIO: 0.91
B-OH-BUTYR BLD STRIP-SCNC: 3.8 MMOL/L (ref 0–0.5)
BACTERIA #/AREA URNS HPF: NORMAL /HPF
BASOPHILS # BLD AUTO: 0.04 K/UL (ref 0–0.2)
BASOPHILS # BLD AUTO: 0.04 K/UL (ref 0–0.2)
BASOPHILS NFR BLD: 0.3 % (ref 0–1.9)
BASOPHILS NFR BLD: 0.3 % (ref 0–1.9)
BILIRUB UR QL STRIP: NEGATIVE
BORDETELLA PARAPERTUSSIS (IS1001): NOT DETECTED
BORDETELLA PERTUSSIS (PTXP): NOT DETECTED
BSA FOR ECHO PROCEDURE: 1.96 M2
BUN SERPL-MCNC: 35 MG/DL (ref 8–23)
BUN SERPL-MCNC: 39 MG/DL (ref 8–23)
BUN SERPL-MCNC: 41 MG/DL (ref 8–23)
BUN SERPL-MCNC: 44 MG/DL (ref 8–23)
BUN SERPL-MCNC: 46 MG/DL (ref 8–23)
CALCIUM SERPL-MCNC: 8.4 MG/DL (ref 8.7–10.5)
CALCIUM SERPL-MCNC: 8.7 MG/DL (ref 8.7–10.5)
CALCIUM SERPL-MCNC: 9 MG/DL (ref 8.7–10.5)
CALCIUM SERPL-MCNC: 9 MG/DL (ref 8.7–10.5)
CALCIUM SERPL-MCNC: 9.1 MG/DL (ref 8.7–10.5)
CHLAMYDIA PNEUMONIAE: NOT DETECTED
CHLORIDE SERPL-SCNC: 102 MMOL/L (ref 95–110)
CHLORIDE SERPL-SCNC: 103 MMOL/L (ref 95–110)
CHLORIDE SERPL-SCNC: 103 MMOL/L (ref 95–110)
CHLORIDE SERPL-SCNC: 95 MMOL/L (ref 95–110)
CHLORIDE SERPL-SCNC: 95 MMOL/L (ref 95–110)
CLARITY UR: CLEAR
CO2 SERPL-SCNC: 11 MMOL/L (ref 23–29)
CO2 SERPL-SCNC: 15 MMOL/L (ref 23–29)
CO2 SERPL-SCNC: 20 MMOL/L (ref 23–29)
CO2 SERPL-SCNC: 20 MMOL/L (ref 23–29)
CO2 SERPL-SCNC: 25 MMOL/L (ref 23–29)
COLOR UR: COLORLESS
CORONAVIRUS 229E, COMMON COLD VIRUS: NOT DETECTED
CORONAVIRUS HKU1, COMMON COLD VIRUS: NOT DETECTED
CORONAVIRUS NL63, COMMON COLD VIRUS: NOT DETECTED
CORONAVIRUS OC43, COMMON COLD VIRUS: NOT DETECTED
CREAT SERPL-MCNC: 1.8 MG/DL (ref 0.5–1.4)
CREAT SERPL-MCNC: 1.9 MG/DL (ref 0.5–1.4)
CREAT SERPL-MCNC: 1.9 MG/DL (ref 0.5–1.4)
CREAT SERPL-MCNC: 2.1 MG/DL (ref 0.5–1.4)
CREAT SERPL-MCNC: 2.2 MG/DL (ref 0.5–1.4)
CREAT UR-MCNC: 15.1 MG/DL (ref 15–325)
CREAT UR-MCNC: 15.1 MG/DL (ref 15–325)
CTP QC/QA: YES
CTP QC/QA: YES
CV ECHO LV RWT: 0.35 CM
D DIMER PPP IA.FEU-MCNC: 0.75 MG/L FEU
DELSYS: ABNORMAL
DIFFERENTIAL METHOD BLD: ABNORMAL
DIFFERENTIAL METHOD BLD: ABNORMAL
DOP CALC AO PEAK VEL: 1.52 M/S
DOP CALC AO VTI: 28 CM
DOP CALC LVOT AREA: 2.6 CM2
DOP CALC LVOT DIAMETER: 1.83 CM
DOP CALC LVOT PEAK VEL: 1.38 M/S
DOP CALC LVOT STROKE VOLUME: 72.29 CM3
DOP CALCLVOT PEAK VEL VTI: 27.5 CM
E WAVE DECELERATION TIME: 92.83 MSEC
E/E' RATIO: 26 M/S
ECHO LV POSTERIOR WALL: 0.75 CM (ref 0.6–1.1)
EOSINOPHIL # BLD AUTO: 0 K/UL (ref 0–0.5)
EOSINOPHIL # BLD AUTO: 0 K/UL (ref 0–0.5)
EOSINOPHIL NFR BLD: 0 % (ref 0–8)
EOSINOPHIL NFR BLD: 0 % (ref 0–8)
EOSINOPHIL URNS QL WRIGHT STN: NORMAL
ERYTHROCYTE [DISTWIDTH] IN BLOOD BY AUTOMATED COUNT: 15.9 % (ref 11.5–14.5)
ERYTHROCYTE [DISTWIDTH] IN BLOOD BY AUTOMATED COUNT: 15.9 % (ref 11.5–14.5)
EST. GFR  (NO RACE VARIABLE): 22 ML/MIN/1.73 M^2
EST. GFR  (NO RACE VARIABLE): 24 ML/MIN/1.73 M^2
EST. GFR  (NO RACE VARIABLE): 27 ML/MIN/1.73 M^2
EST. GFR  (NO RACE VARIABLE): 27 ML/MIN/1.73 M^2
EST. GFR  (NO RACE VARIABLE): 28 ML/MIN/1.73 M^2
FLUBV RNA NPH QL NAA+NON-PROBE: NOT DETECTED
FRACTIONAL SHORTENING: 13 % (ref 28–44)
GLUCOSE SERPL-MCNC: 169 MG/DL (ref 70–110)
GLUCOSE SERPL-MCNC: 195 MG/DL (ref 70–110)
GLUCOSE SERPL-MCNC: 527 MG/DL (ref 70–110)
GLUCOSE SERPL-MCNC: 842 MG/DL (ref 70–110)
GLUCOSE SERPL-MCNC: 901 MG/DL (ref 70–110)
GLUCOSE UR QL STRIP: ABNORMAL
HCO3 UR-SCNC: 16.3 MMOL/L (ref 24–28)
HCO3 UR-SCNC: 18.2 MMOL/L (ref 24–28)
HCO3 UR-SCNC: 26.3 MMOL/L (ref 24–28)
HCO3 UR-SCNC: 29 MMOL/L (ref 24–28)
HCT VFR BLD AUTO: 36.2 % (ref 37–48.5)
HCT VFR BLD AUTO: 36.2 % (ref 37–48.5)
HCT VFR BLD CALC: 34 %PCV (ref 36–54)
HCT VFR BLD CALC: 47 %PCV (ref 36–54)
HGB BLD-MCNC: 11.3 G/DL (ref 12–16)
HGB BLD-MCNC: 11.3 G/DL (ref 12–16)
HGB BLD-MCNC: 12 G/DL
HGB BLD-MCNC: 16 G/DL
HGB UR QL STRIP: NEGATIVE
HPIV1 RNA NPH QL NAA+NON-PROBE: NOT DETECTED
HPIV2 RNA NPH QL NAA+NON-PROBE: NOT DETECTED
HPIV3 RNA NPH QL NAA+NON-PROBE: NOT DETECTED
HPIV4 RNA NPH QL NAA+NON-PROBE: NOT DETECTED
HUMAN METAPNEUMOVIRUS: NOT DETECTED
IMM GRANULOCYTES # BLD AUTO: 0.15 K/UL (ref 0–0.04)
IMM GRANULOCYTES # BLD AUTO: 0.15 K/UL (ref 0–0.04)
IMM GRANULOCYTES NFR BLD AUTO: 1 % (ref 0–0.5)
IMM GRANULOCYTES NFR BLD AUTO: 1 % (ref 0–0.5)
INFLUENZA A (SUBTYPES H1,H1-2009,H3): NOT DETECTED
INR PPP: 1 (ref 0.8–1.2)
INTERVENTRICULAR SEPTUM: 0.82 CM (ref 0.6–1.1)
IVRT: 53.28 MSEC
KETONES UR QL STRIP: NEGATIVE
LA MAJOR: 4.82 CM
LA MINOR: 4.97 CM
LA WIDTH: 3.3 CM
LACTATE SERPL-SCNC: 1 MMOL/L (ref 0.5–2.2)
LACTATE SERPL-SCNC: 2.7 MMOL/L (ref 0.5–2.2)
LACTATE SERPL-SCNC: 6 MMOL/L (ref 0.5–2.2)
LEFT ATRIUM SIZE: 2.97 CM
LEFT ATRIUM VOLUME INDEX: 21.8 ML/M2
LEFT ATRIUM VOLUME: 40.77 CM3
LEFT INTERNAL DIMENSION IN SYSTOLE: 3.75 CM (ref 2.1–4)
LEFT VENTRICLE DIASTOLIC VOLUME INDEX: 44.21 ML/M2
LEFT VENTRICLE DIASTOLIC VOLUME: 82.67 ML
LEFT VENTRICLE MASS INDEX: 55 G/M2
LEFT VENTRICLE SYSTOLIC VOLUME INDEX: 32.2 ML/M2
LEFT VENTRICLE SYSTOLIC VOLUME: 60.16 ML
LEFT VENTRICULAR INTERNAL DIMENSION IN DIASTOLE: 4.29 CM (ref 3.5–6)
LEFT VENTRICULAR MASS: 102.33 G
LEUKOCYTE ESTERASE UR QL STRIP: ABNORMAL
LV LATERAL E/E' RATIO: 19.5 M/S
LV SEPTAL E/E' RATIO: 39 M/S
LVED V (TEICH): 82.67 ML
LVES V (TEICH): 60.16 ML
LVOT MG: 4.38 MMHG
LVOT MV: 1.02 CM/S
LYMPHOCYTES # BLD AUTO: 0.8 K/UL (ref 1–4.8)
LYMPHOCYTES # BLD AUTO: 0.8 K/UL (ref 1–4.8)
LYMPHOCYTES NFR BLD: 4.8 % (ref 18–48)
LYMPHOCYTES NFR BLD: 4.8 % (ref 18–48)
MAGNESIUM SERPL-MCNC: 2.4 MG/DL (ref 1.6–2.6)
MAGNESIUM SERPL-MCNC: 2.5 MG/DL (ref 1.6–2.6)
MCH RBC QN AUTO: 27.6 PG (ref 27–31)
MCH RBC QN AUTO: 27.6 PG (ref 27–31)
MCHC RBC AUTO-ENTMCNC: 31.2 G/DL (ref 32–36)
MCHC RBC AUTO-ENTMCNC: 31.2 G/DL (ref 32–36)
MCV RBC AUTO: 88 FL (ref 82–98)
MCV RBC AUTO: 88 FL (ref 82–98)
MICROSCOPIC COMMENT: NORMAL
MONOCYTES # BLD AUTO: 0.4 K/UL (ref 0.3–1)
MONOCYTES # BLD AUTO: 0.4 K/UL (ref 0.3–1)
MONOCYTES NFR BLD: 2.7 % (ref 4–15)
MONOCYTES NFR BLD: 2.7 % (ref 4–15)
MV PEAK E VEL: 1.56 M/S
MV STENOSIS PRESSURE HALF TIME: 26.92 MS
MV VALVE AREA P 1/2 METHOD: 8.17 CM2
MYCOPLASMA PNEUMONIAE: NOT DETECTED
NEUTROPHILS # BLD AUTO: 14.3 K/UL (ref 1.8–7.7)
NEUTROPHILS # BLD AUTO: 14.3 K/UL (ref 1.8–7.7)
NEUTROPHILS NFR BLD: 91.2 % (ref 38–73)
NEUTROPHILS NFR BLD: 91.2 % (ref 38–73)
NITRITE UR QL STRIP: NEGATIVE
NRBC BLD-RTO: 0 /100 WBC
NRBC BLD-RTO: 0 /100 WBC
OHS QRS DURATION: 106 MS
OHS QRS DURATION: 90 MS
OHS QTC CALCULATION: 466 MS
OHS QTC CALCULATION: 469 MS
OSMOLALITY UR: 387 MOSM/KG (ref 50–1200)
PCO2 BLDA: 38.4 MMHG (ref 35–45)
PCO2 BLDA: 38.7 MMHG (ref 35–45)
PCO2 BLDA: 42.1 MMHG (ref 35–45)
PCO2 BLDA: 63.2 MMHG (ref 35–45)
PH SMN: 7.02 [PH] (ref 7.35–7.45)
PH SMN: 7.28 [PH] (ref 7.35–7.45)
PH SMN: 7.44 [PH] (ref 7.35–7.45)
PH SMN: 7.45 [PH] (ref 7.35–7.45)
PH UR STRIP: 5 [PH] (ref 5–8)
PISA TR MAX VEL: 3.25 M/S
PLATELET # BLD AUTO: 345 K/UL (ref 150–450)
PLATELET # BLD AUTO: 345 K/UL (ref 150–450)
PMV BLD AUTO: 11.7 FL (ref 9.2–12.9)
PMV BLD AUTO: 11.7 FL (ref 9.2–12.9)
PO2 BLDA: 136 MMHG (ref 80–100)
PO2 BLDA: 66 MMHG (ref 80–100)
PO2 BLDA: 70 MMHG (ref 80–100)
PO2 BLDA: 96 MMHG (ref 80–100)
POC BE: -15 MMOL/L
POC BE: -9 MMOL/L
POC BE: 2 MMOL/L
POC BE: 5 MMOL/L
POC IONIZED CALCIUM: 1.27 MMOL/L (ref 1.06–1.42)
POC MOLECULAR INFLUENZA A AGN: NEGATIVE
POC MOLECULAR INFLUENZA B AGN: NEGATIVE
POC SATURATED O2: 92 % (ref 95–100)
POC SATURATED O2: 93 % (ref 95–100)
POC SATURATED O2: 94 % (ref 95–100)
POC SATURATED O2: 99 % (ref 95–100)
POC TCO2: 18 MMOL/L (ref 23–27)
POC TCO2: 19 MMOL/L (ref 23–27)
POC TCO2: 27 MMOL/L (ref 23–27)
POC TCO2: 30 MMOL/L (ref 23–27)
POCT GLUCOSE: 116 MG/DL (ref 70–110)
POCT GLUCOSE: 144 MG/DL (ref 70–110)
POCT GLUCOSE: 161 MG/DL (ref 70–110)
POCT GLUCOSE: 176 MG/DL (ref 70–110)
POCT GLUCOSE: 195 MG/DL (ref 70–110)
POCT GLUCOSE: 202 MG/DL (ref 70–110)
POCT GLUCOSE: 271 MG/DL (ref 70–110)
POCT GLUCOSE: 422 MG/DL (ref 70–110)
POCT GLUCOSE: >500 MG/DL (ref 70–110)
POTASSIUM BLD-SCNC: 4.9 MMOL/L (ref 3.5–5.1)
POTASSIUM SERPL-SCNC: 3.9 MMOL/L (ref 3.5–5.1)
POTASSIUM SERPL-SCNC: 4.7 MMOL/L (ref 3.5–5.1)
POTASSIUM SERPL-SCNC: 5 MMOL/L (ref 3.5–5.1)
POTASSIUM SERPL-SCNC: 5.2 MMOL/L (ref 3.5–5.1)
POTASSIUM SERPL-SCNC: 5.2 MMOL/L (ref 3.5–5.1)
POTASSIUM SERPL-SCNC: 5.3 MMOL/L (ref 3.5–5.1)
PROCALCITONIN SERPL IA-MCNC: 1.66 NG/ML
PROT UR QL STRIP: NEGATIVE
PROT UR-MCNC: <7 MG/DL (ref 0–15)
PROT/CREAT UR: NORMAL MG/G{CREAT} (ref 0–0.2)
PROTHROMBIN TIME: 10.9 SEC (ref 9–12.5)
RA MAJOR: 4.9 CM
RA PRESSURE ESTIMATED: 3 MMHG
RA WIDTH: 4.9 CM
RBC # BLD AUTO: 4.1 M/UL (ref 4–5.4)
RBC # BLD AUTO: 4.1 M/UL (ref 4–5.4)
RBC #/AREA URNS HPF: 1 /HPF (ref 0–4)
RESPIRATORY INFECTION PANEL SOURCE: NORMAL
RSV RNA NPH QL NAA+NON-PROBE: NOT DETECTED
RV TB RVSP: 6 MMHG
RV+EV RNA NPH QL NAA+NON-PROBE: NOT DETECTED
SAMPLE: ABNORMAL
SARS-COV-2 RDRP RESP QL NAA+PROBE: NEGATIVE
SARS-COV-2 RNA RESP QL NAA+PROBE: NOT DETECTED
SITE: ABNORMAL
SITE: ABNORMAL
SODIUM BLD-SCNC: 130 MMOL/L (ref 136–145)
SODIUM SERPL-SCNC: 129 MMOL/L (ref 136–145)
SODIUM SERPL-SCNC: 132 MMOL/L (ref 136–145)
SODIUM SERPL-SCNC: 138 MMOL/L (ref 136–145)
SODIUM SERPL-SCNC: 138 MMOL/L (ref 136–145)
SODIUM SERPL-SCNC: 139 MMOL/L (ref 136–145)
SODIUM UR-SCNC: 79 MMOL/L (ref 20–250)
SP GR UR STRIP: 1.01 (ref 1–1.03)
TDI LATERAL: 0.08 M/S
TDI SEPTAL: 0.04 M/S
TDI: 0.06 M/S
TR MAX PG: 42 MMHG
TV REST PULMONARY ARTERY PRESSURE: 45 MMHG
URATE SERPL-MCNC: 7.4 MG/DL (ref 2.4–5.7)
URN SPEC COLLECT METH UR: ABNORMAL
UROBILINOGEN UR STRIP-ACNC: NEGATIVE EU/DL
UUN UR-MCNC: 155 MG/DL (ref 140–1050)
WBC # BLD AUTO: 15.68 K/UL (ref 3.9–12.7)
WBC # BLD AUTO: 15.68 K/UL (ref 3.9–12.7)
WBC #/AREA URNS HPF: 1 /HPF (ref 0–5)
YEAST URNS QL MICRO: NORMAL
Z-SCORE OF LEFT VENTRICULAR DIMENSION IN END DIASTOLE: -1.95
Z-SCORE OF LEFT VENTRICULAR DIMENSION IN END SYSTOLE: 1.22

## 2024-06-21 PROCEDURE — 80048 BASIC METABOLIC PNL TOTAL CA: CPT | Mod: 91 | Performed by: HOSPITALIST

## 2024-06-21 PROCEDURE — C1751 CATH, INF, PER/CENT/MIDLINE: HCPCS

## 2024-06-21 PROCEDURE — 83735 ASSAY OF MAGNESIUM: CPT | Mod: 91 | Performed by: HOSPITALIST

## 2024-06-21 PROCEDURE — 94660 CPAP INITIATION&MGMT: CPT

## 2024-06-21 PROCEDURE — 84132 ASSAY OF SERUM POTASSIUM: CPT | Performed by: HOSPITALIST

## 2024-06-21 PROCEDURE — A4216 STERILE WATER/SALINE, 10 ML: HCPCS | Performed by: PHYSICIAN ASSISTANT

## 2024-06-21 PROCEDURE — 96365 THER/PROPH/DIAG IV INF INIT: CPT

## 2024-06-21 PROCEDURE — 63600175 PHARM REV CODE 636 W HCPCS: Mod: UD

## 2024-06-21 PROCEDURE — 5A09357 ASSISTANCE WITH RESPIRATORY VENTILATION, LESS THAN 24 CONSECUTIVE HOURS, CONTINUOUS POSITIVE AIRWAY PRESSURE: ICD-10-PCS | Performed by: HOSPITALIST

## 2024-06-21 PROCEDURE — 27100171 HC OXYGEN HIGH FLOW UP TO 24 HOURS

## 2024-06-21 PROCEDURE — 36600 WITHDRAWAL OF ARTERIAL BLOOD: CPT

## 2024-06-21 PROCEDURE — 87635 SARS-COV-2 COVID-19 AMP PRB: CPT | Performed by: PHYSICIAN ASSISTANT

## 2024-06-21 PROCEDURE — 81000 URINALYSIS NONAUTO W/SCOPE: CPT | Performed by: HOSPITALIST

## 2024-06-21 PROCEDURE — 63600175 PHARM REV CODE 636 W HCPCS: Mod: UD | Performed by: HOSPITALIST

## 2024-06-21 PROCEDURE — 82570 ASSAY OF URINE CREATININE: CPT | Performed by: HOSPITALIST

## 2024-06-21 PROCEDURE — 25000003 PHARM REV CODE 250: Performed by: STUDENT IN AN ORGANIZED HEALTH CARE EDUCATION/TRAINING PROGRAM

## 2024-06-21 PROCEDURE — 94761 N-INVAS EAR/PLS OXIMETRY MLT: CPT | Mod: XB

## 2024-06-21 PROCEDURE — 80048 BASIC METABOLIC PNL TOTAL CA: CPT | Performed by: PHYSICIAN ASSISTANT

## 2024-06-21 PROCEDURE — 87205 SMEAR GRAM STAIN: CPT | Performed by: HOSPITALIST

## 2024-06-21 PROCEDURE — 25000242 PHARM REV CODE 250 ALT 637 W/ HCPCS: Performed by: PHYSICIAN ASSISTANT

## 2024-06-21 PROCEDURE — 36415 COLL VENOUS BLD VENIPUNCTURE: CPT | Performed by: HOSPITALIST

## 2024-06-21 PROCEDURE — 84300 ASSAY OF URINE SODIUM: CPT | Performed by: HOSPITALIST

## 2024-06-21 PROCEDURE — 99900035 HC TECH TIME PER 15 MIN (STAT)

## 2024-06-21 PROCEDURE — 63600175 PHARM REV CODE 636 W HCPCS: Mod: UD | Performed by: PHYSICIAN ASSISTANT

## 2024-06-21 PROCEDURE — 25000003 PHARM REV CODE 250: Performed by: PHYSICIAN ASSISTANT

## 2024-06-21 PROCEDURE — 63600175 PHARM REV CODE 636 W HCPCS: Mod: UD | Performed by: STUDENT IN AN ORGANIZED HEALTH CARE EDUCATION/TRAINING PROGRAM

## 2024-06-21 PROCEDURE — 87581 M.PNEUMON DNA AMP PROBE: CPT | Performed by: HOSPITALIST

## 2024-06-21 PROCEDURE — 36415 COLL VENOUS BLD VENIPUNCTURE: CPT | Performed by: PHYSICIAN ASSISTANT

## 2024-06-21 PROCEDURE — 85730 THROMBOPLASTIN TIME PARTIAL: CPT | Performed by: HOSPITALIST

## 2024-06-21 PROCEDURE — 93005 ELECTROCARDIOGRAM TRACING: CPT

## 2024-06-21 PROCEDURE — 27000190 HC CPAP FULL FACE MASK W/VALVE

## 2024-06-21 PROCEDURE — 99291 CRITICAL CARE FIRST HOUR: CPT | Mod: GC,,, | Performed by: INTERNAL MEDICINE

## 2024-06-21 PROCEDURE — 84540 ASSAY OF URINE/UREA-N: CPT | Performed by: HOSPITALIST

## 2024-06-21 PROCEDURE — 94644 CONT INHLJ TX 1ST HOUR: CPT

## 2024-06-21 PROCEDURE — 99291 CRITICAL CARE FIRST HOUR: CPT | Mod: ,,, | Performed by: INTERNAL MEDICINE

## 2024-06-21 PROCEDURE — 83935 ASSAY OF URINE OSMOLALITY: CPT | Performed by: HOSPITALIST

## 2024-06-21 PROCEDURE — 63600175 PHARM REV CODE 636 W HCPCS: Performed by: ORTHOPAEDIC SURGERY

## 2024-06-21 PROCEDURE — 96375 TX/PRO/DX INJ NEW DRUG ADDON: CPT

## 2024-06-21 PROCEDURE — 76937 US GUIDE VASCULAR ACCESS: CPT

## 2024-06-21 PROCEDURE — 20000000 HC ICU ROOM

## 2024-06-21 PROCEDURE — 82800 BLOOD PH: CPT

## 2024-06-21 PROCEDURE — 83605 ASSAY OF LACTIC ACID: CPT | Mod: 91 | Performed by: HOSPITALIST

## 2024-06-21 PROCEDURE — 85610 PROTHROMBIN TIME: CPT | Performed by: HOSPITALIST

## 2024-06-21 PROCEDURE — 84145 PROCALCITONIN (PCT): CPT | Performed by: HOSPITALIST

## 2024-06-21 PROCEDURE — 94640 AIRWAY INHALATION TREATMENT: CPT | Mod: XB

## 2024-06-21 PROCEDURE — 25000003 PHARM REV CODE 250: Performed by: HOSPITALIST

## 2024-06-21 PROCEDURE — 85025 COMPLETE CBC W/AUTO DIFF WBC: CPT | Performed by: HOSPITALIST

## 2024-06-21 PROCEDURE — 82010 KETONE BODYS QUAN: CPT | Performed by: HOSPITALIST

## 2024-06-21 PROCEDURE — 93010 ELECTROCARDIOGRAM REPORT: CPT | Mod: ,,, | Performed by: INTERNAL MEDICINE

## 2024-06-21 PROCEDURE — 83735 ASSAY OF MAGNESIUM: CPT | Performed by: PHYSICIAN ASSISTANT

## 2024-06-21 PROCEDURE — 84550 ASSAY OF BLOOD/URIC ACID: CPT | Performed by: HOSPITALIST

## 2024-06-21 PROCEDURE — 93010 ELECTROCARDIOGRAM REPORT: CPT | Mod: 76,,, | Performed by: INTERNAL MEDICINE

## 2024-06-21 PROCEDURE — 25000003 PHARM REV CODE 250: Performed by: ORTHOPAEDIC SURGERY

## 2024-06-21 PROCEDURE — 99900031 HC PATIENT EDUCATION (STAT)

## 2024-06-21 PROCEDURE — 87798 DETECT AGENT NOS DNA AMP: CPT | Performed by: HOSPITALIST

## 2024-06-21 PROCEDURE — 36410 VNPNXR 3YR/> PHY/QHP DX/THER: CPT

## 2024-06-21 PROCEDURE — 85379 FIBRIN DEGRADATION QUANT: CPT | Performed by: PHYSICIAN ASSISTANT

## 2024-06-21 PROCEDURE — 27000221 HC OXYGEN, UP TO 24 HOURS

## 2024-06-21 PROCEDURE — 82803 BLOOD GASES ANY COMBINATION: CPT

## 2024-06-21 RX ORDER — MUPIROCIN 20 MG/G
OINTMENT TOPICAL 2 TIMES DAILY
Status: COMPLETED | OUTPATIENT
Start: 2024-06-21 | End: 2024-06-26

## 2024-06-21 RX ORDER — INSULIN GLARGINE 100 [IU]/ML
16 INJECTION, SOLUTION SUBCUTANEOUS 2 TIMES DAILY
Status: DISCONTINUED | OUTPATIENT
Start: 2024-06-21 | End: 2024-06-21

## 2024-06-21 RX ORDER — FLUTICASONE PROPIONATE 50 MCG
1 SPRAY, SUSPENSION (ML) NASAL DAILY
Status: DISCONTINUED | OUTPATIENT
Start: 2024-06-21 | End: 2024-06-26 | Stop reason: HOSPADM

## 2024-06-21 RX ORDER — IPRATROPIUM BROMIDE 0.5 MG/2.5ML
0.5 SOLUTION RESPIRATORY (INHALATION) ONCE
Status: COMPLETED | OUTPATIENT
Start: 2024-06-21 | End: 2024-06-21

## 2024-06-21 RX ORDER — SODIUM CHLORIDE 0.9 % (FLUSH) 0.9 %
10 SYRINGE (ML) INJECTION
Status: DISCONTINUED | OUTPATIENT
Start: 2024-06-21 | End: 2024-06-25

## 2024-06-21 RX ORDER — SODIUM CHLORIDE 9 MG/ML
125 INJECTION, SOLUTION INTRAVENOUS CONTINUOUS
Status: DISCONTINUED | OUTPATIENT
Start: 2024-06-21 | End: 2024-06-21

## 2024-06-21 RX ORDER — LORAZEPAM 2 MG/ML
0.5 INJECTION INTRAMUSCULAR ONCE
Status: COMPLETED | OUTPATIENT
Start: 2024-06-21 | End: 2024-06-21

## 2024-06-21 RX ORDER — METOPROLOL SUCCINATE 50 MG/1
100 TABLET, EXTENDED RELEASE ORAL DAILY
Status: DISCONTINUED | OUTPATIENT
Start: 2024-06-21 | End: 2024-06-23

## 2024-06-21 RX ORDER — ATORVASTATIN CALCIUM 20 MG/1
40 TABLET, FILM COATED ORAL NIGHTLY
Status: DISCONTINUED | OUTPATIENT
Start: 2024-06-21 | End: 2024-06-21

## 2024-06-21 RX ORDER — LIDOCAINE HYDROCHLORIDE 10 MG/ML
1 INJECTION, SOLUTION EPIDURAL; INFILTRATION; INTRACAUDAL; PERINEURAL ONCE
Status: COMPLETED | OUTPATIENT
Start: 2024-06-21 | End: 2024-06-21

## 2024-06-21 RX ORDER — LIDOCAINE 50 MG/G
1 PATCH TOPICAL DAILY
Status: DISCONTINUED | OUTPATIENT
Start: 2024-06-21 | End: 2024-06-22

## 2024-06-21 RX ORDER — GLUCAGON 1 MG
1 KIT INJECTION
Status: DISCONTINUED | OUTPATIENT
Start: 2024-06-21 | End: 2024-06-21

## 2024-06-21 RX ORDER — IBUPROFEN 200 MG
24 TABLET ORAL
Status: DISCONTINUED | OUTPATIENT
Start: 2024-06-21 | End: 2024-06-21

## 2024-06-21 RX ORDER — FUROSEMIDE 10 MG/ML
80 INJECTION INTRAMUSCULAR; INTRAVENOUS ONCE
Status: COMPLETED | OUTPATIENT
Start: 2024-06-21 | End: 2024-06-21

## 2024-06-21 RX ORDER — MAGNESIUM SULFATE HEPTAHYDRATE 40 MG/ML
2 INJECTION, SOLUTION INTRAVENOUS ONCE
Status: COMPLETED | OUTPATIENT
Start: 2024-06-21 | End: 2024-06-21

## 2024-06-21 RX ORDER — HYDROCODONE BITARTRATE AND ACETAMINOPHEN 10; 325 MG/1; MG/1
1 TABLET ORAL EVERY 8 HOURS PRN
Status: DISCONTINUED | OUTPATIENT
Start: 2024-06-21 | End: 2024-06-25

## 2024-06-21 RX ORDER — MAGNESIUM SULFATE HEPTAHYDRATE 40 MG/ML
2 INJECTION, SOLUTION INTRAVENOUS ONCE
Status: DISCONTINUED | OUTPATIENT
Start: 2024-06-21 | End: 2024-06-21

## 2024-06-21 RX ORDER — MAGNESIUM SULFATE HEPTAHYDRATE 40 MG/ML
INJECTION, SOLUTION INTRAVENOUS
Status: COMPLETED
Start: 2024-06-21 | End: 2024-06-21

## 2024-06-21 RX ORDER — METOCLOPRAMIDE 5 MG/1
5 TABLET ORAL
Status: DISCONTINUED | OUTPATIENT
Start: 2024-06-21 | End: 2024-06-26 | Stop reason: HOSPADM

## 2024-06-21 RX ORDER — MORPHINE SULFATE 2 MG/ML
2 INJECTION, SOLUTION INTRAMUSCULAR; INTRAVENOUS
Status: COMPLETED | OUTPATIENT
Start: 2024-06-21 | End: 2024-06-21

## 2024-06-21 RX ORDER — IBUPROFEN 200 MG
16 TABLET ORAL
Status: DISCONTINUED | OUTPATIENT
Start: 2024-06-21 | End: 2024-06-21

## 2024-06-21 RX ORDER — METHYLPREDNISOLONE ACETATE 80 MG/ML
40 INJECTION, SUSPENSION INTRA-ARTICULAR; INTRALESIONAL; INTRAMUSCULAR; SOFT TISSUE ONCE
Status: COMPLETED | OUTPATIENT
Start: 2024-06-21 | End: 2024-06-21

## 2024-06-21 RX ORDER — ALLOPURINOL 300 MG/1
300 TABLET ORAL DAILY
Status: DISCONTINUED | OUTPATIENT
Start: 2024-06-21 | End: 2024-06-26 | Stop reason: HOSPADM

## 2024-06-21 RX ORDER — INSULIN ASPART 100 [IU]/ML
0-5 INJECTION, SOLUTION INTRAVENOUS; SUBCUTANEOUS
Status: DISCONTINUED | OUTPATIENT
Start: 2024-06-21 | End: 2024-06-21

## 2024-06-21 RX ORDER — DEXTROSE MONOHYDRATE AND SODIUM CHLORIDE 5; .45 G/100ML; G/100ML
125 INJECTION, SOLUTION INTRAVENOUS CONTINUOUS PRN
Status: DISCONTINUED | OUTPATIENT
Start: 2024-06-21 | End: 2024-06-25

## 2024-06-21 RX ORDER — INSULIN ASPART 100 [IU]/ML
10 INJECTION, SOLUTION INTRAVENOUS; SUBCUTANEOUS
Status: DISCONTINUED | OUTPATIENT
Start: 2024-06-21 | End: 2024-06-21

## 2024-06-21 RX ORDER — ALBUTEROL SULFATE 2.5 MG/.5ML
10 SOLUTION RESPIRATORY (INHALATION) CONTINUOUS
Status: DISCONTINUED | OUTPATIENT
Start: 2024-06-21 | End: 2024-06-21

## 2024-06-21 RX ORDER — HEPARIN SODIUM,PORCINE/D5W 25000/250
0-40 INTRAVENOUS SOLUTION INTRAVENOUS CONTINUOUS
Status: DISCONTINUED | OUTPATIENT
Start: 2024-06-21 | End: 2024-06-21

## 2024-06-21 RX ORDER — SERTRALINE HYDROCHLORIDE 100 MG/1
100 TABLET, FILM COATED ORAL DAILY
Status: DISCONTINUED | OUTPATIENT
Start: 2024-06-21 | End: 2024-06-26 | Stop reason: HOSPADM

## 2024-06-21 RX ORDER — CLONIDINE HYDROCHLORIDE 0.1 MG/1
0.1 TABLET ORAL 2 TIMES DAILY
Status: DISCONTINUED | OUTPATIENT
Start: 2024-06-21 | End: 2024-06-26 | Stop reason: HOSPADM

## 2024-06-21 RX ORDER — LOSARTAN POTASSIUM 50 MG/1
50 TABLET ORAL DAILY
Status: DISCONTINUED | OUTPATIENT
Start: 2024-06-21 | End: 2024-06-21

## 2024-06-21 RX ORDER — NIFEDIPINE 30 MG/1
30 TABLET, EXTENDED RELEASE ORAL 2 TIMES DAILY
Status: DISCONTINUED | OUTPATIENT
Start: 2024-06-21 | End: 2024-06-26 | Stop reason: HOSPADM

## 2024-06-21 RX ORDER — HEPARIN SODIUM 5000 [USP'U]/ML
5000 INJECTION, SOLUTION INTRAVENOUS; SUBCUTANEOUS
Status: DISCONTINUED | OUTPATIENT
Start: 2024-06-21 | End: 2024-06-26 | Stop reason: HOSPADM

## 2024-06-21 RX ORDER — SODIUM CHLORIDE 0.9 % (FLUSH) 0.9 %
3 SYRINGE (ML) INJECTION
Status: DISCONTINUED | OUTPATIENT
Start: 2024-06-21 | End: 2024-06-25

## 2024-06-21 RX ORDER — ASPIRIN 81 MG/1
81 TABLET ORAL DAILY
Status: DISCONTINUED | OUTPATIENT
Start: 2024-06-21 | End: 2024-06-26 | Stop reason: HOSPADM

## 2024-06-21 RX ORDER — IPRATROPIUM BROMIDE AND ALBUTEROL SULFATE 2.5; .5 MG/3ML; MG/3ML
3 SOLUTION RESPIRATORY (INHALATION) EVERY 4 HOURS PRN
Status: DISCONTINUED | OUTPATIENT
Start: 2024-06-21 | End: 2024-06-26 | Stop reason: HOSPADM

## 2024-06-21 RX ORDER — PREDNISONE 20 MG/1
40 TABLET ORAL DAILY
Status: DISCONTINUED | OUTPATIENT
Start: 2024-06-21 | End: 2024-06-21

## 2024-06-21 RX ORDER — METHOCARBAMOL 500 MG/1
500 TABLET, FILM COATED ORAL 3 TIMES DAILY PRN
Status: DISCONTINUED | OUTPATIENT
Start: 2024-06-21 | End: 2024-06-26 | Stop reason: HOSPADM

## 2024-06-21 RX ORDER — HYDRALAZINE HYDROCHLORIDE 20 MG/ML
15 INJECTION INTRAMUSCULAR; INTRAVENOUS EVERY 4 HOURS PRN
Status: DISCONTINUED | OUTPATIENT
Start: 2024-06-21 | End: 2024-06-26 | Stop reason: HOSPADM

## 2024-06-21 RX ORDER — TORSEMIDE 20 MG/1
20 TABLET ORAL DAILY
Status: DISCONTINUED | OUTPATIENT
Start: 2024-06-21 | End: 2024-06-21

## 2024-06-21 RX ORDER — IPRATROPIUM BROMIDE AND ALBUTEROL SULFATE 2.5; .5 MG/3ML; MG/3ML
3 SOLUTION RESPIRATORY (INHALATION) EVERY 4 HOURS
Status: DISCONTINUED | OUTPATIENT
Start: 2024-06-21 | End: 2024-06-26 | Stop reason: HOSPADM

## 2024-06-21 RX ORDER — IPRATROPIUM BROMIDE AND ALBUTEROL SULFATE 2.5; .5 MG/3ML; MG/3ML
3 SOLUTION RESPIRATORY (INHALATION) ONCE
Status: DISCONTINUED | OUTPATIENT
Start: 2024-06-21 | End: 2024-06-21

## 2024-06-21 RX ADMIN — LIDOCAINE HYDROCHLORIDE 10 MG: 10 INJECTION, SOLUTION EPIDURAL; INFILTRATION; INTRACAUDAL; PERINEURAL at 06:06

## 2024-06-21 RX ADMIN — Medication 10 ML: at 06:06

## 2024-06-21 RX ADMIN — HYDROCODONE BITARTRATE AND ACETAMINOPHEN 1 TABLET: 10; 325 TABLET ORAL at 06:06

## 2024-06-21 RX ADMIN — NIFEDIPINE 30 MG: 30 TABLET, FILM COATED, EXTENDED RELEASE ORAL at 09:06

## 2024-06-21 RX ADMIN — METOCLOPRAMIDE 5 MG: 5 TABLET ORAL at 10:06

## 2024-06-21 RX ADMIN — LOSARTAN POTASSIUM 50 MG: 50 TABLET, FILM COATED ORAL at 09:06

## 2024-06-21 RX ADMIN — SERTRALINE HYDROCHLORIDE 100 MG: 50 TABLET ORAL at 09:06

## 2024-06-21 RX ADMIN — INSULIN HUMAN 0.05 UNITS/KG/HR: 1 INJECTION, SOLUTION INTRAVENOUS at 07:06

## 2024-06-21 RX ADMIN — POTASSIUM BICARBONATE 50 MEQ: 978 TABLET, EFFERVESCENT ORAL at 04:06

## 2024-06-21 RX ADMIN — METHYLPREDNISOLONE ACETATE 40 MG: 80 INJECTION, SUSPENSION INTRA-ARTICULAR; INTRALESIONAL; INTRAMUSCULAR; SOFT TISSUE at 06:06

## 2024-06-21 RX ADMIN — SODIUM CHLORIDE 125 ML/HR: 9 INJECTION, SOLUTION INTRAVENOUS at 10:06

## 2024-06-21 RX ADMIN — ASPIRIN 81 MG: 81 TABLET, COATED ORAL at 09:06

## 2024-06-21 RX ADMIN — INSULIN HUMAN 0.2 UNITS/KG/HR: 1 INJECTION, SOLUTION INTRAVENOUS at 02:06

## 2024-06-21 RX ADMIN — Medication 10 ML: at 04:06

## 2024-06-21 RX ADMIN — HEPARIN SODIUM 5000 UNITS: 5000 INJECTION INTRAVENOUS; SUBCUTANEOUS at 11:06

## 2024-06-21 RX ADMIN — FLUTICASONE PROPIONATE 50 MCG: 50 SPRAY, METERED NASAL at 09:06

## 2024-06-21 RX ADMIN — MORPHINE SULFATE 2 MG: 2 INJECTION, SOLUTION INTRAMUSCULAR; INTRAVENOUS at 02:06

## 2024-06-21 RX ADMIN — INSULIN HUMAN 3 UNITS/HR: 1 INJECTION, SOLUTION INTRAVENOUS at 06:06

## 2024-06-21 RX ADMIN — IPRATROPIUM BROMIDE AND ALBUTEROL SULFATE 3 ML: 2.5; .5 SOLUTION RESPIRATORY (INHALATION) at 11:06

## 2024-06-21 RX ADMIN — ACETAMINOPHEN 650 MG: 325 TABLET, FILM COATED ORAL at 12:06

## 2024-06-21 RX ADMIN — INSULIN HUMAN 0.1 UNITS/KG/HR: 1 INJECTION, SOLUTION INTRAVENOUS at 10:06

## 2024-06-21 RX ADMIN — IPRATROPIUM BROMIDE 0.5 MG: 0.5 SOLUTION RESPIRATORY (INHALATION) at 02:06

## 2024-06-21 RX ADMIN — IPRATROPIUM BROMIDE AND ALBUTEROL SULFATE 3 ML: 2.5; .5 SOLUTION RESPIRATORY (INHALATION) at 02:06

## 2024-06-21 RX ADMIN — IPRATROPIUM BROMIDE AND ALBUTEROL SULFATE 3 ML: 2.5; .5 SOLUTION RESPIRATORY (INHALATION) at 03:06

## 2024-06-21 RX ADMIN — IPRATROPIUM BROMIDE AND ALBUTEROL SULFATE 3 ML: 2.5; .5 SOLUTION RESPIRATORY (INHALATION) at 07:06

## 2024-06-21 RX ADMIN — LORAZEPAM 0.5 MG: 2 INJECTION INTRAMUSCULAR; INTRAVENOUS at 03:06

## 2024-06-21 RX ADMIN — HEPARIN SODIUM 5000 UNITS: 5000 INJECTION INTRAVENOUS; SUBCUTANEOUS at 08:06

## 2024-06-21 RX ADMIN — MAGNESIUM SULFATE HEPTAHYDRATE 2 G: 40 INJECTION, SOLUTION INTRAVENOUS at 01:06

## 2024-06-21 RX ADMIN — Medication 10 ML: at 09:06

## 2024-06-21 RX ADMIN — FUROSEMIDE 80 MG: 10 INJECTION, SOLUTION INTRAMUSCULAR; INTRAVENOUS at 04:06

## 2024-06-21 RX ADMIN — IPRATROPIUM BROMIDE AND ALBUTEROL SULFATE 3 ML: 2.5; .5 SOLUTION RESPIRATORY (INHALATION) at 12:06

## 2024-06-21 RX ADMIN — METOCLOPRAMIDE 5 MG: 5 TABLET ORAL at 04:06

## 2024-06-21 RX ADMIN — IPRATROPIUM BROMIDE AND ALBUTEROL SULFATE 3 ML: 2.5; .5 SOLUTION RESPIRATORY (INHALATION) at 05:06

## 2024-06-21 RX ADMIN — CLONIDINE HYDROCHLORIDE 0.1 MG: 0.1 TABLET ORAL at 09:06

## 2024-06-21 RX ADMIN — LIDOCAINE 1 PATCH: 50 PATCH CUTANEOUS at 11:06

## 2024-06-21 RX ADMIN — FUROSEMIDE 80 MG: 10 INJECTION, SOLUTION INTRAMUSCULAR; INTRAVENOUS at 09:06

## 2024-06-21 RX ADMIN — METOPROLOL SUCCINATE 100 MG: 50 TABLET, EXTENDED RELEASE ORAL at 09:06

## 2024-06-21 RX ADMIN — ALLOPURINOL 300 MG: 300 TABLET ORAL at 09:06

## 2024-06-21 RX ADMIN — MICONAZOLE NITRATE: 20 OINTMENT TOPICAL at 08:06

## 2024-06-21 RX ADMIN — MUPIROCIN: 20 OINTMENT TOPICAL at 08:06

## 2024-06-21 NOTE — CONSULTS
Pt seen by diabetes education.     Brief discussion with daughter - not appropriate time for diabetes education and dtr in agreement. Pt has had diabetes for many years and has been on MDI prior to hospitalization. Does need some assistance with navigating insurance coverage of Dexcom sensors. Will follow-up outpatient - referral already in. Dtr prefers to call when ready to schedule. Offered in person or virtual. My direct contact information was provided.

## 2024-06-21 NOTE — ASSESSMENT & PLAN NOTE
- Ms. Indira Waters presents with shortness of breath  - she is a former smoker, with chronic bronchitis, has been diagnosed with chronic airflow limitation, chronic diastolic heart failure, and obesity   - ED labs show H&H that are consistent with her chronic anemia level   - she improves with use of DuoNebs    - PRN DuoNebs are ordered [id number] w/ COPD Exacerbation pathways     -she was treated with 125 mg methylprednisolone in the ED   - BNP slightly elevated at 132 without pulmonary edema on CXR but with reported b/l LE swelling    - she is not on any diuretics at home    - last Echo was 12/16/23 w/ EF 50%  - Covid test negative   - Influenza test pending   - marked elevation in BP at MN with subsequent desaturation and decline in respiratory status    - ABG w/ hypercapnia and hypoxia on SPO2 monitor    - BiPap applied    - repeat CXR w/ pulmonary edema   - changed orders from Tele floor to ICU

## 2024-06-21 NOTE — H&P
"  [facility] Medicine  History & Physical    Patient Name: Indira Waters  MRN: [id number]  Patient Class: OP- Observation  Admission Date: 6/20/2024  Attending Physician: Rey Lieberman MD   Primary Care Provider: Chun Zapata MD         Patient information was obtained from patient, relative(s), past medical records, and ER records.     Subjective:     Principal Problem:Acute respiratory failure with hypoxia and hypercapnia    Chief Complaint:   Chief Complaint   Patient presents with    Shortness of Breath     Feels SOB "when it rains outside", sent from PCP for evaluation for low SpO2. Hx of COPD, no home O2 use per pt.         HPI: Ms. Indira Waters is a 79 y.o. female, with PMH of Chronic pain, Chronic airflow limitation, chronic bronchitis, former smoker, CAD, HTN, T2DM, Diabetic polyneuropathy, RA, GERD, MDD, anxiety, fibromyalgia, h/o cervical cancer, obesity, CDK-4, SLE, who presented to [facility] ED on 6/20/24 from her PCPs office due to shortness of breath that occurs when it rains outside with associated hypoxia in the office. She states she has felt short of breath all week, and that at baseline she has FERRIS, which is now also occurring at rest and is associated with chest tightness. She further notes onset of lower extremity swelling. She denied fever, chills, cough. She does not utilize home O2. ED workup with labs including a CBC which shows anemia with H&H of 10.4/32.0, but no leukocytosis or left shift. A metabolic panel showed RANJEET with BUN 33, and Cr of 1.9 without hyperkalemia. CO2 was 22, and anion gap was 16, glucose was 382. BNP was 132, without elevation of troponin, and with CTX showing no acute cardiopulmonary process. A D-dimer was not elevated. She was treated in the ED with 3 DuoNebs, one hour long neb, and IV steroids. Initially she was maintaining adequate O2 sats on room air. At about midnight her BP jerri to 204/77, when she was apparently " reporting pain, and then at about 01:40 she had an acute decline in O2 sats with worsened shortness of breath. An ABG was ordered and showed CO2 retention, she was placed on BiPap. Orders were changed from tele to ICU. She was placed on observation.     Past Medical History:   Diagnosis Date    Arthritis     Back pain     Cancer     ovarian    Cervical cancer     Coronary artery disease     Depression     Diabetes mellitus     Fibromyalgia     Heart attack     History of MI (myocardial infarction)     Hyperlipidemia     Hypertension     Lupus     Stroke     slight left sided weakness       Past Surgical History:   Procedure Laterality Date    APPENDECTOMY       SECTION      2    CORONARY ANGIOGRAPHY N/A 2022    Procedure: ANGIOGRAM, CORONARY ARTERY;  Surgeon: Jose L Méndez MD;  Location: [facility] CATH LAB;  Service: Cardiology;  Laterality: N/A;    HYSTERECTOMY      with USO for cervical cancer    INJECTION OF ANESTHETIC AGENT AROUND NERVE Bilateral 2021    Procedure: BLOCK, NERVE, SYMPATHIC;  Surgeon: Holden Pereira MD;  Location: [facility] PAIN MGT;  Service: Pain Management;  Laterality: Bilateral;    INJECTION OF ANESTHETIC AGENT AROUND NERVE N/A 2021    Procedure: BLOCK, NERVE, SYMPATHETIC  need consent;  Surgeon: Holden Pereira MD;  Location: [facility] PAIN MGT;  Service: Pain Management;  Laterality: N/A;    MD EVAL,SWALLOW FUNCTION,CINE/VIDEO RECORD  2021         TONSILLECTOMY      TRIAL OF SPINAL CORD NERVE STIMULATOR N/A 3/8/2023    Procedure: LUMBAR SPINAL CORD STIMULATOR TRIAL NEVRO REP PATIENT STATES SHE NO LONGER TAKES PLAVIX;  Surgeon: Holden Pereira MD;  Location: [facility] PAIN MGT;  Service: Pain Management;  Laterality: N/A;       Review of patient's allergies indicates:   Allergen Reactions    Bleach (sodium hypochlorite) Shortness Of Breath    Nitrofurantoin macrocrystalline Anaphylaxis    Lipitor [atorvastatin] Diarrhea and Rash    Nsaids (non-steroidal  anti-inflammatory drug)      Tolerates aspirin      Pcn [penicillins]     Toradol [ketorolac]        No current facility-administered medications on file prior to encounter.     Current Outpatient Medications on File Prior to Encounter   Medication Sig    acetaminophen (TYLENOL) 500 MG tablet Take 2 tablets (1,000 mg total) by mouth every 8 (eight) hours as needed for Pain.    allopurinoL (ZYLOPRIM) 300 MG tablet TAKE ONE TABLET BY MOUTH EVERY DAY    aspirin (ECOTRIN) 81 MG EC tablet Take 1 tablet (81 mg total) by mouth once daily.    atorvastatin (LIPITOR) 40 MG tablet Take 40 mg by mouth every evening. (Patient not taking: Reported on 6/5/2024)    blood sugar diagnostic Strp To check BG 6 times daily, to use with insurance preferred meter    blood-glucose meter kit To check BG 6 times daily, to use with insurance preferred meter    blood-glucose meter,continuous (DEXCOM G7 ) Misc Use as directed.    blood-glucose sensor (DEXCOM G7 SENSOR) Nicole Change every 10 days.    cloNIDine (CATAPRES) 0.1 MG tablet Take 1 tablet (0.1 mg total) by mouth 2 (two) times daily.    dicyclomine (BENTYL) 10 MG capsule Take 10 mg by mouth 3 (three) times daily. (Patient not taking: Reported on 6/20/2024)    evolocumab (REPATHA SURECLICK) 140 mg/mL PnIj Inject 1 mL (140 mg total) into the skin every 14 (fourteen) days.    fenofibrate micronized (LOFIBRA) 134 MG Cap Take 1 capsule (134 mg total) by mouth daily with breakfast.    fluticasone propionate (FLONASE) 50 mcg/actuation nasal spray Inhale 1 spray every day by intranasal route as needed. (Patient not taking: Reported on 5/28/2024)    HYDROcodone-acetaminophen (NORCO)  mg per tablet Take 1 tablet by mouth every 8 (eight) hours as needed for Pain.    hydrocortisone (ANUSOL-HC) 2.5 % rectal cream Procto-Med HC 2.5 % topical cream perineal applicator    insulin aspart U-100 (NOVOLOG FLEXPEN U-100 INSULIN) 100 unit/mL (3 mL) InPn pen Inject 14 Units into the skin 3  "(three) times daily with meals. + meal correction scale. Max 40 units    insulin detemir U-100, Levemir, (LEVEMIR FLEXTOUCH U100 INSULIN) 100 unit/mL (3 mL) InPn pen Inject 20 Units into the skin once daily AND 18 Units every evening.    ipratropium-albuteroL (COMBIVENT)  mcg/actuation inhaler Inhale 1 puff into the lungs by mouth every 6 (six) hours.    lancets [facility] To check BG 6 times daily, to use with insurance preferred meter    losartan (COZAAR) 50 MG tablet Take 1 tablet (50 mg total) by mouth once daily.    methocarbamoL (ROBAXIN) 500 MG Tab TAKE ONE TABLET BY MOUTH 3 TIMES DAILY AS NEEDED FOR MUSCLE SPASMS    metoclopramide HCl (REGLAN) 5 MG tablet TAKE ONE TABLET BY MOUTH FOUR TIMES DAILY AS NEEDED FOR nausea prevention    metoprolol succinate (TOPROL-XL) 100 MG 24 hr tablet Take 1 tablet (100 mg total) by mouth once daily.    mupirocin (BACTROBAN) 2 % ointment Apply topically 3 (three) times daily as needed (skin breakdown).    NIFEdipine (PROCARDIA-XL) 30 MG (OSM) 24 hr tablet Take 1 tablet (30 mg total) by mouth 2 (two) times a day.    nitroGLYCERIN (NITROSTAT) 0.4 MG SL tablet Place 1 tablet (0.4 mg total) under the tongue every 5 (five) minutes as needed for Chest pain. (Patient not taking: Reported on 5/28/2024)    pantoprazole (PROTONIX) 40 MG tablet Take 1 tablet (40 mg total) by mouth once daily. (Patient not taking: Reported on 5/15/2024)    pen needle, diabetic 31 gauge x 1/4" Ndle 1 each by Misc.(Non-Drug; Combo Route) route 5 (five) times daily.    pioglitazone (ACTOS) 15 MG tablet Take 15 mg by mouth once daily.    pregabalin (LYRICA) 150 MG capsule Take 1 capsule (150 mg total) by mouth 4 (four) times daily. TAKE ONE CAPSULE BY MOUTH 3 TIMES DAILY    senna-docusate 8.6-50 mg (PERICOLACE) 8.6-50 mg per tablet Take 1 tablet by mouth 2 (two) times daily as needed for Constipation.    sertraline (ZOLOFT) 100 MG tablet Take 1 tablet (100 mg total) by mouth once daily.    teriparatide 20 " mcg/dose (620mcg/2.48mL) PnIj Inject 20 mcg into the skin once daily. Discard remainder after 28 days of use.    torsemide (DEMADEX) 20 MG Tab Take 1 tablet (20 mg total) by mouth once daily.    triamcinolone acetonide 0.1% (KENALOG) 0.1 % cream Apply to affected areas of body twice daily as needed rash. Do not use on face, underarms, or groin.    [DISCONTINUED] blood-glucose transmitter (DEXCOM G6 TRANSMITTER) Nicole 1 each by Misc.(Non-Drug; Combo Route) route continuous prn.     Family History       Problem Relation (Age of Onset)    COPD Mother    Colon cancer Maternal Grandmother    Coronary artery disease Father    Diabetes Father    Hernia Mother    Lupus Mother    Ovarian cancer Mother    Uterine cancer Mother          Tobacco Use    Smoking status: Former     Current packs/day: 0.00     Types: Cigarettes     Quit date: 11/2020     Years since quitting: 3.6     Passive exposure: Past    Smokeless tobacco: Never   Substance and Sexual Activity    Alcohol use: Not Currently     Comment: occasionally    Drug use: Yes     Types: Hydrocodone     Comment: three times a day    Sexual activity: Not Currently     Review of Systems   Unable to perform ROS: Acuity of condition     Objective:     Vital Signs (Most Recent):  Temp: 98.9 °F (37.2 °C) (06/21/24 0310)  Pulse: 105 (06/21/24 0645)  Resp: 18 (06/21/24 0645)  BP: (!) 119/59 (06/21/24 0645)  SpO2: 97 % (06/21/24 0645) Vital Signs (24h Range):  Temp:  [98 °F (36.7 °C)-98.9 °F (37.2 °C)] 98.9 °F (37.2 °C)  Pulse:  [] 105  Resp:  [14-29] 18  SpO2:  [91 %-100 %] 97 %  BP: (104-204)/(54-90) 119/59     Weight: 90.7 kg (200 lb)  Body mass index is 39.06 kg/m².     Physical Exam  Vitals and nursing note reviewed.   Constitutional:       General: She is not in acute distress.     Appearance: She is well-developed. She is obese. She is ill-appearing (chronically). She is not toxic-appearing or diaphoretic.   HENT:      Head: Normocephalic and atraumatic.   Eyes:       General: No scleral icterus.        Right eye: No discharge.         Left eye: No discharge.      Conjunctiva/sclera: Conjunctivae normal.   Neck:      Trachea: No tracheal deviation.   Cardiovascular:      Rate and Rhythm: Normal rate and regular rhythm.      Heart sounds: Normal heart sounds. No murmur heard.     No gallop.      Comments: Trace LLE edema.     Pulmonary:      Effort: Respiratory distress present.      Breath sounds: No stridor. Wheezing present. No rales.   Abdominal:      General: Bowel sounds are normal. There is no distension.      Palpations: Abdomen is soft. There is no mass.      Tenderness: There is no abdominal tenderness. There is no guarding.   Musculoskeletal:         General: Swelling (left knee) and tenderness (left knee) present. No deformity. Normal range of motion.      Cervical back: Normal range of motion and neck supple.   Skin:     General: Skin is warm and dry.      Coloration: Skin is not pale.      Findings: No erythema or rash.      Comments: Left knee with significant warm with palpation. No erythema or fluctuance with palpation.    Neurological:      General: No focal deficit present.      Mental Status: She is alert and oriented to person, place, and time.      Cranial Nerves: No cranial nerve deficit.      Motor: No abnormal muscle tone.   Psychiatric:         Mood and Affect: Mood normal.         Behavior: Behavior normal.         Thought Content: Thought content normal.         Judgment: Judgment normal.                Significant Labs: All pertinent labs within the past 24 hours have been reviewed.  ABGs:   Recent Labs   Lab 06/21/24  0156 06/21/24  0239   PH 7.018* 7.283*   PCO2 63.2* 38.4   HCO3 16.3* 18.2*   POCSATURATED 92 99   BE -15* -9*   PO2 96 136*     BMP:   Recent Labs   Lab 06/21/24  0409   *   *   K 5.3*   CL 95   CO2 11*   BUN 35*   CREATININE 2.1*   CALCIUM 8.7   MG 2.5     CBC:   Recent Labs   Lab 06/20/24  1103 06/20/24  1608  "06/21/24  0156   WBC 12.42 11.59  --    HGB 10.3* 10.4*  --    HCT 33.0* 32.0* 47    276  --      CMP:   Recent Labs   Lab 06/20/24  1103 06/20/24  1608 06/21/24  0409   * 133* 129*   K 4.5 5.0 5.3*   CL 96 95 95   CO2 21* 22* 11*   * 382* 842*   BUN 35* 33* 35*   CREATININE 1.9* 1.9* 2.1*   CALCIUM 9.5 9.2 8.7   PROT 6.9 7.1  --    ALBUMIN 3.5 3.5  --    BILITOT 0.2 0.1  --    ALKPHOS 54* 53*  --    AST 14 22  --    ALT 14 15  --    ANIONGAP 14 16 23*     Cardiac Markers:   Recent Labs   Lab 06/20/24  1608   *     Urine Culture: No results for input(s): "LABURIN" in the last 48 hours.  Urine Studies:   Recent Labs   Lab 06/20/24  2137   COLORU Colorless*   APPEARANCEUA Clear   PHUR 6.0   SPECGRAV 1.010   PROTEINUA Negative   GLUCUA 4+*   KETONESU Negative   BILIRUBINUA Negative   OCCULTUA Negative   NITRITE Negative   UROBILINOGEN Negative   LEUKOCYTESUR 1+*   WBCUA 7*   BACTERIA Occasional   SQUAMEPITHEL 2   HYALINECASTS 0       Significant Imaging: I have reviewed all pertinent imaging results/findings within the past 24 hours.  Imaging Results               X-Ray Chest AP Portable (Final result)  Result time 06/21/24 05:09:00      Final result by Shantel Gonzalez MD (06/21/24 05:09:00)                   Impression:      Please see above.      Electronically signed by: Shantel Gonzalez MD  Date:    06/21/2024  Time:    05:09               Narrative:    EXAMINATION:  XR CHEST AP PORTABLE    CLINICAL HISTORY:  Acute Respiratory Failure;    TECHNIQUE:  Single frontal view of the chest was performed.    COMPARISON:  06/20/2024    FINDINGS:  Cardiac monitoring leads overlie the chest.  The cardiomediastinal silhouette is unchanged in size and configuration.  The lungs are symmetrically expanded with interval detrimental change in lung aeration with new bilateral mixed interstitial and more focal airspace opacities with a bibasilar predominance.  Findings can be seen with edema, infectious or " non-infectious inflammatory process.  No evidence of pneumothorax.    This report was flagged in Epic as abnormal.                                       X-Ray Chest 1 View (Final result)  Result time 06/20/24 14:45:39   Procedure changed from X-Ray Chest AP Portable     Final result by Karon Pennington MD (06/20/24 14:45:39)                   Impression:      No acute cardiopulmonary process seen.      Electronically signed by: Karon Pennington  Date:    06/20/2024  Time:    14:45               Narrative:    EXAMINATION:  XR CHEST 1 VIEW    CLINICAL HISTORY:  SOB;    TECHNIQUE:  Single frontal view of the chest was performed.    COMPARISON:  06/20/2024    FINDINGS:  Lungs are well expanded.  No acute consolidation, pleural effusion, or pneumothorax.    Cardiac silhouette is normal in size when accounting for technique.  Calcified plaque lines arch                                       Assessment/Plan:     * Acute respiratory failure with hypoxia and hypercapnia  - Ms. Indira Waters presents with shortness of breath  - she is a former smoker, with chronic bronchitis, has been diagnosed with chronic airflow limitation, chronic diastolic heart failure, and obesity   - ED labs show H&H that are consistent with her chronic anemia level   - she improves with use of DuoNebs    - PRN DuoNebs are ordered [id number] w/ COPD Exacerbation pathways     -she was treated with 125 mg methylprednisolone in the ED   - BNP slightly elevated at 132 without pulmonary edema on CXR but with reported b/l LE swelling    - she is not on any diuretics at home    - last Echo was 12/16/23 w/ EF 50%  - Covid test negative   - Influenza test pending   - marked elevation in BP at MN with subsequent desaturation and decline in respiratory status    - ABG w/ hypercapnia and hypoxia on SPO2 monitor    - BiPap applied    - repeat CXR w/ pulmonary edema   - changed orders from Tele floor to ICU     Acute on chronic diastolic heart failure  Patient  is identified as having Diastolic (HFpEF) heart failure that is Chronic. CHF is currently controlled. Latest ECHO performed and demonstrates- Results for orders placed during the hospital encounter of 12/15/23    Echo    Interpretation Summary    Left Ventricle: The left ventricle is normal in size. Mildly increased wall thickness. Unable to assess wall motion. There is low normal systolic function with a visually estimated ejection fraction of 50 - 55%. There is indeterminate diastolic function.    Left Atrium: Left atrium is mildly dilated.    Aortic Valve: There is aortic valve sclerosis without clinically significant stenosis.    Mitral Valve: Mildly calcified anterior leaflet. There is mild posterior mitral annular calcification present. There is mild stenosis. The mean pressure gradient across the mitral valve is 3.6 mmHg at a heart rate of 79 bpm. There is trace to mild regurgitation.    Right Ventricle: Normal right ventricular cavity size. Systolic function is normal.    Technically difficult study with poor endocardial border visualization.  . Continue Beta Blocker and ACE/ARB and monitor clinical status closely. Monitor on telemetry. Patient is off CHF pathway.  Monitor strict Is&Os and daily weights.  Place on fluid restriction of 1.5 L. Cardiology has not been consulted. Continue to stress to patient importance of self efficacy and  on diet for CHF. Last BNP reviewed- and noted below   Recent Labs   Lab 06/20/24  1608   *       Type 2 diabetes mellitus with hyperglycemia  Patient's FSGs are uncontrolled due to hyperglycemia on current medication regimen.  Last A1c reviewed-   Lab Results   Component Value Date    HGBA1C 9.5 (H) 06/05/2024     Most recent fingerstick glucose reviewed-   Recent Labs   Lab 06/21/24  0459 06/21/24  0501   POCTGLUCOSE >500* >500*     Current correctional scale  Low  Maintain anti-hyperglycemic dose as follows-   Antihyperglycemics (From admission, onward)       Start     Stop Route Frequency Ordered    06/21/24 0630  insulin regular in 0.9 % NaCl 100 unit/100 mL (1 unit/mL) infusion        Question:  Enter initial dose from Infusion Protocol Chart (Units/hr):  Answer:  3    -- IV Continuous 06/21/24 0526          Hold Oral hypoglycemics while patient is in the hospital.  - subsequent to treatment with IV steroids, the patient's hyperglycemia became markedly worse   - AM labs w/ evidence of DKA   - insulin drip initiated by eICU     Diabetic polyneuropathy  - no gabapentin on home meds list     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 pectoris  - Continue statin & ASA   - Patient with known CAD, which is controlled Will continue ASA and Statin and monitor for S/Sx of angina/ACS. Continue to monitor on telemetry.     Essential hypertension  Chronic, poorly controlled. Latest blood pressure and vitals reviewed-     Temp:  [98 °F (36.7 °C)]   Pulse:  []   Resp:  [14-18]   BP: (104-149)/(54-63)   SpO2:  [91 %-100 %] .   Home meds for hypertension were reviewed and noted below.   Hypertension Medications               cloNIDine (CATAPRES) 0.1 MG tablet Take 1 tablet (0.1 mg total) by mouth 2 (two) times daily.    losartan (COZAAR) 50 MG tablet Take 1 tablet (50 mg total) by mouth once daily.    metoprolol succinate (TOPROL-XL) 100 MG 24 hr tablet Take 1 tablet (100 mg total) by mouth once daily.    NIFEdipine (PROCARDIA-XL) 30 MG (OSM) 24 hr tablet Take 1 tablet (30 mg total) by mouth 2 (two) times a day.    nitroGLYCERIN (NITROSTAT) 0.4 MG SL tablet Place 1 tablet (0.4 mg total) under the tongue every 5 (five) minutes as needed for Chest pain.    torsemide (DEMADEX) 20 MG Tab Take 1 tablet (20 mg total) by mouth once daily.            While in the hospital, will manage blood pressure as follows; Continue home antihypertensive regimen    Will utilize p.r.n. blood pressure medication only if patient's blood pressure greater than 180/110  and she develops symptoms such as worsening chest pain or shortness of breath.    Stage 4 chronic kidney disease  - Creatine stable for now. BMP reviewed- noted Estimated Creatinine Clearance: 24.5 mL/min (A) (based on SCr of 1.9 mg/dL (H)). according to latest data.  -  Based on current GFR, CKD stage is stage 4 - GFR 15-29.  Monitor UOP and serial BMP and adjust therapy as needed. Renally dose meds. Avoid nephrotoxic medications and procedures.    Systemic lupus erythematosus, unspecified  - continue home medications     Rheumatoid arthritis, unspecified  - continue home medications       Hemiplegia and hemiparesis following cerebral infarction affecting left non-dominant side  - fall precautions       Noncompliance with medication regimen  - noted         VTE Risk Mitigation (From admission, onward)           Ordered     heparin 25,000 units in dextrose 5% (100 units/ml) IV bolus from bag HIGH INTENSITY nomogram - [facility]  As needed (PRN)        Question:  Heparin Infusion Adjustment (DO NOT MODIFY ANSWER)  Answer:  \\[email or web address]\epic\Images\Pharmacy\HeparinInfusions\heparin HIGH INTENSITY nomogram for [facility] [id number]    06/21/24 0231     heparin 25,000 units in dextrose 5% (100 units/ml) IV bolus from bag HIGH INTENSITY nomogram - [facility]  As needed (PRN)        Question:  Heparin Infusion Adjustment (DO NOT MODIFY ANSWER)  Answer:  \\[email or web address]\epic\Images\Pharmacy\HeparinInfusions\heparin HIGH INTENSITY nomogram for [facility] [id number]    06/21/24 0231     heparin 25,000 units in dextrose 5% (100 units/ml) IV bolus from bag HIGH INTENSITY nomogram - [facility]  Once        Question:  Heparin Infusion Adjustment (DO NOT MODIFY ANSWER)  Answer:  \\[email or web address]\epic\Images\Pharmacy\HeparinInfusions\heparin HIGH INTENSITY nomogram for [facility] [id number].pdf    06/21/24 0231     heparin 25,000 units in dextrose 5% 250 mL (100 units/mL) infusion HIGH INTENSITY nomogram - [facility]  Continuous        Question:  Begin at (units/kg/hr)  Answer:  18     06/21/24 0231     IP VTE HIGH RISK PATIENT  Once         06/21/24 0010     Place sequential compression device  Until discontinued         06/21/24 0010     Place sequential compression device  Until discontinued         06/20/24 2353                         On 06/21/2024, patient should be placed in hospital observation services under the care of Dr. HEAVENLY Perez MD.           Marifer Boudreaux PA-C  Department of Hospital Medicine  [facility] - Emergency Dept

## 2024-06-21 NOTE — EICU
New Patient Evaluation    HPI:  79 F obese (BMI 39), former smoker, CAD, HFpEF, DM (A1C 9.5), hypertension, dyslipidemia, RA/SLE, CKD, CVA with reidual hemiparesis, presents from PCPs office due to shortness of breath and was hypoxic. This was accompanied by chest tightness and LE swelling. CXR without acute findings. Placed on BiPap for acute on chronic hypercapnic respiratory failure. ABG 7.018/63/96    Camera Assessment:  /63    O2 98%  Seen tolerating BiPap 12/5 FiO2 70%, rate of 16, TB 600s, MV 13  Weak looking but responsive    Data:  WBC 11.59, H/H 10.4/32, platelets 276  Na 133, K 5, creatinine 1.9   and 132, Trop O 0.009 and 0.006  UA negative ketones,  1+ leukocyte esterase  ABG 7.28/38/136    Assessment and Plans:  Acute on chronic hypercapnic and hypoxemic respiratory failure. Managed as chronic bronchitis with prednisone and bronchodilators. Does have signs of volume overload, afterload reduction with NIV, torsemide and BP control.  DM on basal and prandial insulin

## 2024-06-21 NOTE — PLAN OF CARE
Problem: Adult Inpatient Plan of Care  Goal: Plan of Care Review  Outcome: Progressing  Goal: Patient-Specific Goal (Individualized)  Outcome: Progressing  Goal: Absence of Hospital-Acquired Illness or Injury  Outcome: Progressing  Goal: Optimal Comfort and Wellbeing  Outcome: Progressing     Problem: COPD (Chronic Obstructive Pulmonary Disease)  Goal: Effective Oxygenation and Ventilation  Outcome: Progressing     Problem: Fall Injury Risk  Goal: Absence of Fall and Fall-Related Injury  Outcome: Progressing     Problem: Wound  Goal: Skin Health and Integrity  Outcome: Progressing  Goal: Optimal Wound Healing  Outcome: Progressing

## 2024-06-21 NOTE — SUBJECTIVE & OBJECTIVE
Past Medical History:   Diagnosis Date    Arthritis     Back pain     Cancer     ovarian    Cervical cancer     Coronary artery disease     Depression     Diabetes mellitus     Fibromyalgia     Heart attack     History of MI (myocardial infarction)     Hyperlipidemia     Hypertension     Lupus     Stroke     slight left sided weakness       Past Surgical History:   Procedure Laterality Date    APPENDECTOMY       SECTION      2    CORONARY ANGIOGRAPHY N/A 2022    Procedure: ANGIOGRAM, CORONARY ARTERY;  Surgeon: Jose L Méndez MD;  Location: [facility] CATH LAB;  Service: Cardiology;  Laterality: N/A;    HYSTERECTOMY      with USO for cervical cancer    INJECTION OF ANESTHETIC AGENT AROUND NERVE Bilateral 2021    Procedure: BLOCK, NERVE, SYMPATHIC;  Surgeon: Holden Pereira MD;  Location: [facility] PAIN MGT;  Service: Pain Management;  Laterality: Bilateral;    INJECTION OF ANESTHETIC AGENT AROUND NERVE N/A 2021    Procedure: BLOCK, NERVE, SYMPATHETIC  need consent;  Surgeon: Holden Pereira MD;  Location: [facility] PAIN MGT;  Service: Pain Management;  Laterality: N/A;    IN EVAL,SWALLOW FUNCTION,CINE/VIDEO RECORD  2021         TONSILLECTOMY      TRIAL OF SPINAL CORD NERVE STIMULATOR N/A 3/8/2023    Procedure: LUMBAR SPINAL CORD STIMULATOR TRIAL NEVRO REP PATIENT STATES SHE NO LONGER TAKES PLAVIX;  Surgeon: Holden Pereira MD;  Location: [facility] PAIN MGT;  Service: Pain Management;  Laterality: N/A;       Review of patient's allergies indicates:   Allergen Reactions    Bleach (sodium hypochlorite) Shortness Of Breath    Nitrofurantoin macrocrystalline Anaphylaxis    Lipitor [atorvastatin] Diarrhea and Rash    Nsaids (non-steroidal anti-inflammatory drug)      Tolerates aspirin      Pcn [penicillins]     Toradol [ketorolac]        No current facility-administered medications on file prior to encounter.     Current Outpatient Medications on File Prior to Encounter   Medication Sig     acetaminophen (TYLENOL) 500 MG tablet Take 2 tablets (1,000 mg total) by mouth every 8 (eight) hours as needed for Pain.    allopurinoL (ZYLOPRIM) 300 MG tablet TAKE ONE TABLET BY MOUTH EVERY DAY    aspirin (ECOTRIN) 81 MG EC tablet Take 1 tablet (81 mg total) by mouth once daily.    atorvastatin (LIPITOR) 40 MG tablet Take 40 mg by mouth every evening. (Patient not taking: Reported on 6/5/2024)    blood sugar diagnostic Strp To check BG 6 times daily, to use with insurance preferred meter    blood-glucose meter kit To check BG 6 times daily, to use with insurance preferred meter    blood-glucose meter,continuous (DEXCOM G7 ) Misc Use as directed.    blood-glucose sensor (DEXCOM G7 SENSOR) Nicole Change every 10 days.    cloNIDine (CATAPRES) 0.1 MG tablet Take 1 tablet (0.1 mg total) by mouth 2 (two) times daily.    dicyclomine (BENTYL) 10 MG capsule Take 10 mg by mouth 3 (three) times daily. (Patient not taking: Reported on 6/20/2024)    evolocumab (REPATHA SURECLICK) 140 mg/mL PnIj Inject 1 mL (140 mg total) into the skin every 14 (fourteen) days.    fenofibrate micronized (LOFIBRA) 134 MG Cap Take 1 capsule (134 mg total) by mouth daily with breakfast.    fluticasone propionate (FLONASE) 50 mcg/actuation nasal spray Inhale 1 spray every day by intranasal route as needed. (Patient not taking: Reported on 5/28/2024)    HYDROcodone-acetaminophen (NORCO)  mg per tablet Take 1 tablet by mouth every 8 (eight) hours as needed for Pain.    hydrocortisone (ANUSOL-HC) 2.5 % rectal cream Procto-Med HC 2.5 % topical cream perineal applicator    insulin aspart U-100 (NOVOLOG FLEXPEN U-100 INSULIN) 100 unit/mL (3 mL) InPn pen Inject 14 Units into the skin 3 (three) times daily with meals. + meal correction scale. Max 40 units    insulin detemir U-100, Levemir, (LEVEMIR FLEXTOUCH U100 INSULIN) 100 unit/mL (3 mL) InPn pen Inject 20 Units into the skin once daily AND 18 Units every evening.    ipratropium-albuteroL  "(COMBIVENT)  mcg/actuation inhaler Inhale 1 puff into the lungs by mouth every 6 (six) hours.    lancets [facility] To check BG 6 times daily, to use with insurance preferred meter    losartan (COZAAR) 50 MG tablet Take 1 tablet (50 mg total) by mouth once daily.    methocarbamoL (ROBAXIN) 500 MG Tab TAKE ONE TABLET BY MOUTH 3 TIMES DAILY AS NEEDED FOR MUSCLE SPASMS    metoclopramide HCl (REGLAN) 5 MG tablet TAKE ONE TABLET BY MOUTH FOUR TIMES DAILY AS NEEDED FOR nausea prevention    metoprolol succinate (TOPROL-XL) 100 MG 24 hr tablet Take 1 tablet (100 mg total) by mouth once daily.    mupirocin (BACTROBAN) 2 % ointment Apply topically 3 (three) times daily as needed (skin breakdown).    NIFEdipine (PROCARDIA-XL) 30 MG (OSM) 24 hr tablet Take 1 tablet (30 mg total) by mouth 2 (two) times a day.    nitroGLYCERIN (NITROSTAT) 0.4 MG SL tablet Place 1 tablet (0.4 mg total) under the tongue every 5 (five) minutes as needed for Chest pain. (Patient not taking: Reported on 5/28/2024)    pantoprazole (PROTONIX) 40 MG tablet Take 1 tablet (40 mg total) by mouth once daily. (Patient not taking: Reported on 5/15/2024)    pen needle, diabetic 31 gauge x 1/4" Ndle 1 each by Misc.(Non-Drug; Combo Route) route 5 (five) times daily.    pioglitazone (ACTOS) 15 MG tablet Take 15 mg by mouth once daily.    pregabalin (LYRICA) 150 MG capsule Take 1 capsule (150 mg total) by mouth 4 (four) times daily. TAKE ONE CAPSULE BY MOUTH 3 TIMES DAILY    senna-docusate 8.6-50 mg (PERICOLACE) 8.6-50 mg per tablet Take 1 tablet by mouth 2 (two) times daily as needed for Constipation.    sertraline (ZOLOFT) 100 MG tablet Take 1 tablet (100 mg total) by mouth once daily.    teriparatide 20 mcg/dose (620mcg/2.48mL) PnIj Inject 20 mcg into the skin once daily. Discard remainder after 28 days of use.    torsemide (DEMADEX) 20 MG Tab Take 1 tablet (20 mg total) by mouth once daily.    triamcinolone acetonide 0.1% (KENALOG) 0.1 % cream Apply to affected " areas of body twice daily as needed rash. Do not use on face, underarms, or groin.    [DISCONTINUED] blood-glucose transmitter (DEXCOM G6 TRANSMITTER) Nicole 1 each by Misc.(Non-Drug; Combo Route) route continuous prn.     Family History       Problem Relation (Age of Onset)    COPD Mother    Colon cancer Maternal Grandmother    Coronary artery disease Father    Diabetes Father    Hernia Mother    Lupus Mother    Ovarian cancer Mother    Uterine cancer Mother          Tobacco Use    Smoking status: Former     Current packs/day: 0.00     Types: Cigarettes     Quit date: 11/2020     Years since quitting: 3.6     Passive exposure: Past    Smokeless tobacco: Never   Substance and Sexual Activity    Alcohol use: Not Currently     Comment: occasionally    Drug use: Yes     Types: Hydrocodone     Comment: three times a day    Sexual activity: Not Currently     Review of Systems   Unable to perform ROS: Acuity of condition     Objective:     Vital Signs (Most Recent):  Temp: 98.9 °F (37.2 °C) (06/21/24 0310)  Pulse: 105 (06/21/24 0645)  Resp: 18 (06/21/24 0645)  BP: (!) 119/59 (06/21/24 0645)  SpO2: 97 % (06/21/24 0645) Vital Signs (24h Range):  Temp:  [98 °F (36.7 °C)-98.9 °F (37.2 °C)] 98.9 °F (37.2 °C)  Pulse:  [] 105  Resp:  [14-29] 18  SpO2:  [91 %-100 %] 97 %  BP: (104-204)/(54-90) 119/59     Weight: 90.7 kg (200 lb)  Body mass index is 39.06 kg/m².     Physical Exam  Vitals and nursing note reviewed.   Constitutional:       General: She is not in acute distress.     Appearance: She is well-developed. She is obese. She is ill-appearing (chronically). She is not toxic-appearing or diaphoretic.   HENT:      Head: Normocephalic and atraumatic.   Eyes:      General: No scleral icterus.        Right eye: No discharge.         Left eye: No discharge.      Conjunctiva/sclera: Conjunctivae normal.   Neck:      Trachea: No tracheal deviation.   Cardiovascular:      Rate and Rhythm: Normal rate and regular rhythm.       Heart sounds: Normal heart sounds. No murmur heard.     No gallop.      Comments: Trace LLE edema.     Pulmonary:      Effort: Respiratory distress present.      Breath sounds: No stridor. Wheezing present. No rales.   Abdominal:      General: Bowel sounds are normal. There is no distension.      Palpations: Abdomen is soft. There is no mass.      Tenderness: There is no abdominal tenderness. There is no guarding.   Musculoskeletal:         General: Swelling (left knee) and tenderness (left knee) present. No deformity. Normal range of motion.      Cervical back: Normal range of motion and neck supple.   Skin:     General: Skin is warm and dry.      Coloration: Skin is not pale.      Findings: No erythema or rash.      Comments: Left knee with significant warm with palpation. No erythema or fluctuance with palpation.    Neurological:      General: No focal deficit present.      Mental Status: She is alert and oriented to person, place, and time.      Cranial Nerves: No cranial nerve deficit.      Motor: No abnormal muscle tone.   Psychiatric:         Mood and Affect: Mood normal.         Behavior: Behavior normal.         Thought Content: Thought content normal.         Judgment: Judgment normal.                Significant Labs: All pertinent labs within the past 24 hours have been reviewed.  ABGs:   Recent Labs   Lab 06/21/24  0156 06/21/24  0239   PH 7.018* 7.283*   PCO2 63.2* 38.4   HCO3 16.3* 18.2*   POCSATURATED 92 99   BE -15* -9*   PO2 96 136*     BMP:   Recent Labs   Lab 06/21/24  0409   *   *   K 5.3*   CL 95   CO2 11*   BUN 35*   CREATININE 2.1*   CALCIUM 8.7   MG 2.5     CBC:   Recent Labs   Lab 06/20/24  1103 06/20/24  1608 06/21/24  0156   WBC 12.42 11.59  --    HGB 10.3* 10.4*  --    HCT 33.0* 32.0* 47    276  --      CMP:   Recent Labs   Lab 06/20/24  1103 06/20/24  1608 06/21/24  0409   * 133* 129*   K 4.5 5.0 5.3*   CL 96 95 95   CO2 21* 22* 11*   * 382* 842*   BUN  "35* 33* 35*   CREATININE 1.9* 1.9* 2.1*   CALCIUM 9.5 9.2 8.7   PROT 6.9 7.1  --    ALBUMIN 3.5 3.5  --    BILITOT 0.2 0.1  --    ALKPHOS 54* 53*  --    AST 14 22  --    ALT 14 15  --    ANIONGAP 14 16 23*     Cardiac Markers:   Recent Labs   Lab 06/20/24  1608   *     Urine Culture: No results for input(s): "LABURIN" in the last 48 hours.  Urine Studies:   Recent Labs   Lab 06/20/24  2137   COLORU Colorless*   APPEARANCEUA Clear   PHUR 6.0   SPECGRAV 1.010   PROTEINUA Negative   GLUCUA 4+*   KETONESU Negative   BILIRUBINUA Negative   OCCULTUA Negative   NITRITE Negative   UROBILINOGEN Negative   LEUKOCYTESUR 1+*   WBCUA 7*   BACTERIA Occasional   SQUAMEPITHEL 2   HYALINECASTS 0       Significant Imaging: I have reviewed all pertinent imaging results/findings within the past 24 hours.  Imaging Results               X-Ray Chest AP Portable (Final result)  Result time 06/21/24 05:09:00      Final result by Shantel Gonzalez MD (06/21/24 05:09:00)                   Impression:      Please see above.      Electronically signed by: Shantel Gonzalez MD  Date:    06/21/2024  Time:    05:09               Narrative:    EXAMINATION:  XR CHEST AP PORTABLE    CLINICAL HISTORY:  Acute Respiratory Failure;    TECHNIQUE:  Single frontal view of the chest was performed.    COMPARISON:  06/20/2024    FINDINGS:  Cardiac monitoring leads overlie the chest.  The cardiomediastinal silhouette is unchanged in size and configuration.  The lungs are symmetrically expanded with interval detrimental change in lung aeration with new bilateral mixed interstitial and more focal airspace opacities with a bibasilar predominance.  Findings can be seen with edema, infectious or non-infectious inflammatory process.  No evidence of pneumothorax.    This report was flagged in Epic as abnormal.                                       X-Ray Chest 1 View (Final result)  Result time 06/20/24 14:45:39   Procedure changed from X-Ray Chest AP Portable     " Final result by Karon Pennington MD (06/20/24 14:45:39)                   Impression:      No acute cardiopulmonary process seen.      Electronically signed by: Karon Pennington  Date:    06/20/2024  Time:    14:45               Narrative:    EXAMINATION:  XR CHEST 1 VIEW    CLINICAL HISTORY:  SOB;    TECHNIQUE:  Single frontal view of the chest was performed.    COMPARISON:  06/20/2024    FINDINGS:  Lungs are well expanded.  No acute consolidation, pleural effusion, or pneumothorax.    Cardiac silhouette is normal in size when accounting for technique.  Calcified plaque lines arch

## 2024-06-21 NOTE — PROGRESS NOTES
[facility] ([place])  Wound Care    Patient Name:  Indira Waters   MRN:  [id number]  Date: 6/21/2024  Diagnosis: Acute respiratory failure with hypoxia and hypercapnia    History:     Past Medical History:   Diagnosis Date    Arthritis     Back pain     Cancer     ovarian    Cervical cancer     Coronary artery disease     Depression     Diabetes mellitus     Fibromyalgia     Heart attack     History of MI (myocardial infarction)     Hyperlipidemia     Hypertension     Lupus     Stroke     slight left sided weakness       Social History     Socioeconomic History    Marital status:    Tobacco Use    Smoking status: Former     Current packs/day: 0.00     Types: Cigarettes     Quit date: 11/2020     Years since quitting: 3.6     Passive exposure: Past    Smokeless tobacco: Never   Substance and Sexual Activity    Alcohol use: Not Currently     Comment: occasionally    Drug use: Yes     Types: Hydrocodone     Comment: three times a day    Sexual activity: Not Currently   Social History Narrative    Lives with daughter. .      Social Determinants of Health     Financial Resource Strain: Low Risk  (5/22/2024)    Overall Financial Resource Strain (CARDIA)     Difficulty of Paying Living Expenses: Not very hard   Food Insecurity: No Food Insecurity (5/22/2024)    Hunger Vital Sign     Worried About Running Out of Food in the Last Year: Never true     Ran Out of Food in the Last Year: Never true   Transportation Needs: No Transportation Needs (2/27/2024)    PRAPARE - Transportation     Lack of Transportation (Medical): No     Lack of Transportation (Non-Medical): No   Physical Activity: Insufficiently Active (5/22/2024)    Exercise Vital Sign     Days of Exercise per Week: 4 days     Minutes of Exercise per Session: 10 min   Stress: No Stress Concern Present (5/22/2024)    [employer] - Occupational Stress Questionnaire     Feeling of Stress : Only a little    Housing Stability: Unknown (2/27/2024)    Housing Stability Vital Sign     Unable to Pay for Housing in the Last Year: No     Unstable Housing in the Last Year: No       Precautions:     Allergies as of 06/20/2024 - Reviewed 06/20/2024   Allergen Reaction Noted    Bleach (sodium hypochlorite) Shortness Of Breath 09/20/2021    Nitrofurantoin macrocrystalline Anaphylaxis 04/20/2014    Lipitor [atorvastatin] Diarrhea and Rash 10/14/2021    Nsaids (non-steroidal anti-inflammatory drug)  04/20/2014    Pcn [penicillins]  04/20/2014    Toradol [ketorolac]  04/20/2014       WO Assessment Details/Treatment   Wound care consulted for lower sacrum, back and lower buttocks  Ecchymosis noted to right hand, skin intact. Left hand with bloody dressing. Removed dressing, cleaned wound and appears to be an old puncture site.   Patient incontinent of urine and sacral protective foam soaked with urine. Versette device in use.   MASD Rash noted to buttocks and perineal area with red satellite lesions will order critic aid antifungal with 2% miconazole.   Right upper back with small red abrasion- cleaned and dressed with mepilex border.  Placed on pressure injury prevention orders.  ICU support surface set to patients weight for low air loss.     06/21/24 1006        Altered Skin Integrity 01/31/24 1827 Left upper;medial Buttocks #1 Moisture associated dermatitis   Date First Assessed/Time First Assessed: 01/31/24 1827   Altered Skin Integrity Present on Admission - Did Patient arrive to the hospital with altered skin?: yes  Side: Left  Orientation: upper;medial  Location: Buttocks  Wound Number: #1  Is this inj...   Wound Image    Dressing Appearance Open to air   Drainage Amount None   Drainage Characteristics/Odor No odor   Appearance Pink;Red;Moist  (satelite lesions)   Tissue loss description Partial thickness   Periwound Area Satellite lesion;Redness;Moist   Wound Edges Open   Care Cleansed with:;Wound cleanser  (ordered critic  aid antifungal ointment)        Wound 06/21/24 1006 Abrasion(s) Right upper Thoracic spine   Date First Assessed/Time First Assessed: 06/21/24 1006   Primary Wound Type: Abrasion(s)  Side: Right  Orientation: upper  Location: Thoracic spine   Wound Image    Dressing Appearance Open to air   Drainage Amount None   Drainage Characteristics/Odor No odor   Appearance Red;Dry   Tissue loss description Partial thickness   Periwound Area Intact   Wound Edges Open   Wound Length (cm) 0.3 cm   Wound Width (cm) 0.3 cm   Wound Surface Area (cm^2) 0.09 cm^2   Care Cleansed with:;Wound cleanser   Dressing Applied;Silicone;Foam         06/21/2024

## 2024-06-21 NOTE — ASSESSMENT & PLAN NOTE
-Baseline Cr 1.2-1.5  -On admit Cr 1.9 and has had mild hyperkalemia and pseudohyponatremia due to hyperglycemia  -Cr now up to 2.2  -Avoid nephrotoxic agents and renally dose meds  -Appears euvolemic to mildly hypervolemic on exam - difficult to manage fluid balance given her DKA and CHF.  -Hold home torsemide and losartan for now  -Agree with trial of 1 time IV lasix 80mg  -Continue IV fluids for DKA as above.  -Check FEUrea, UA, urine protein creatine ratio, urine eosinophils and renal us.  -Repeat BMP q4h   -If worsening tomorrow will consult nephrology.

## 2024-06-21 NOTE — CONSULTS
[facility]    Date of admission:  6/20/2024     Reason for Consult:    Heart failure    HPI:    This 71-year-old female is known to me for history of coronary artery disease and heart failure.  Presented with worsening shortness of breath.  Initially felt to be related to a COPD exacerbation.  She was initially improving but then had a rise in her blood pressure in deterioration in her pulmonary status.  Required noninvasive positive pressure ventilation.  Improved.  Feels better this morning but not back to baseline.    Has a history of coronary artery disease which is predominantly distal diabetic disease and not amenable to revascularization.  Has mild left ventricular systolic dysfunction.  Has recently been stable on medical therapy.    Medications  Current Facility-Administered Medications   Medication Dose Route Frequency Provider Last Rate Last Admin    0.9%  NaCl infusion  125 mL/hr Intravenous Continuous Rey Lieberman  mL/hr at 06/21/24 1022 125 mL/hr at 06/21/24 1022    acetaminophen tablet 650 mg  650 mg Oral Q6H PRN Marifer Boudreaux PA-C   650 mg at 06/21/24 0043    albuterol-ipratropium 2.5 mg-0.5 mg/3 mL nebulizer solution 3 mL  3 mL Nebulization Q4H PRN Marifer Boudreaux PA-C        albuterol-ipratropium 2.5 mg-0.5 mg/3 mL nebulizer solution 3 mL  3 mL Nebulization Q4H Marifer Boudreaux, PA-C   3 mL at 06/21/24 0759    allopurinoL tablet 300 mg  300 mg Oral Daily Marifer Boudreaux PA-C   300 mg at 06/21/24 0955    aspirin EC tablet 81 mg  81 mg Oral Daily Marifer Boudreaux PA-C   81 mg at 06/21/24 0954    atorvastatin tablet 40 mg  40 mg Oral QHS Marifer Boudreaux PA-C        cloNIDine tablet 0.1 mg  0.1 mg Oral BID Marifer Boudreaux PA-C   0.1 mg at 06/21/24 0955    dextrose 10% bolus 125 mL 125 mL  12.5 g Intravenous PRN Rey Lieberman MD        dextrose 10% bolus 250 mL 250 mL  25 g Intravenous PRN Rey Lieberman MD        dextrose 5 % and  0.45 % NaCl infusion  125 mL/hr Intravenous Continuous PRN Rey Lieberman MD        fluticasone propionate 50 mcg/actuation nasal spray 50 mcg  1 spray Each Nostril Daily Marifer Boudreaux PA-C   50 mcg at 06/21/24 0954    heparin (porcine) injection 5,000 Units  5,000 Units Subcutaneous Q8H Rey Lieberman MD        HYDROcodone-acetaminophen  mg per tablet 1 tablet  1 tablet Oral Q8H PRN Marifer Boudreaux PA-C        insulin regular in 0.9 % NaCl 100 unit/100 mL (1 unit/mL) infusion  0-0.2 Units/kg/hr Intravenous Continuous Rey Lieberman MD 18.1 mL/hr at 06/21/24 1145 0.2 Units/kg/hr at 06/21/24 1145    LIDOcaine 5 % patch 1 patch  1 patch Transdermal Daily Rey Lieberman MD        losartan tablet 50 mg  50 mg Oral Daily Marifer Boudreaux PA-C   50 mg at 06/21/24 0955    melatonin tablet 6 mg  6 mg Oral Nightly PRN Jazzmine Martinez MD        methocarbamoL tablet 500 mg  500 mg Oral TID PRN Marifer Boudreaux PA-C        metoclopramide HCl tablet 5 mg  5 mg Oral QID (AC & HS) Marifer Boudreaux PA-C   5 mg at 06/21/24 1029    metoprolol succinate (TOPROL-XL) 24 hr tablet 100 mg  100 mg Oral Daily Marifer Boudreaux PA-C   100 mg at 06/21/24 0955    miconazole nitrate 2% ointment   Topical (Top) BID Rey Lieberman MD        mupirocin 2 % ointment   Nasal BID Rey Lieberman MD        naloxone 0.4 mg/mL injection 0.02 mg  0.02 mg Intravenous PRN Marifer Boudreaux PA-C        NIFEdipine 24 hr tablet 30 mg  30 mg Oral BID Marifer Boudreaux PA-ELISHA   30 mg at 06/21/24 0955    senna-docusate 8.6-50 mg per tablet 1 tablet  1 tablet Oral BID PRN Marifer Boudreaux PA-C        sertraline tablet 100 mg  100 mg Oral Daily Marifer Boudreaux PA-ELISHA   100 mg at 06/21/24 0955    sodium chloride 0.9% flush 10 mL  10 mL Intravenous PRN Jazzmine Martinez MD        sodium chloride 0.9% flush 10 mL  10 mL Intravenous Q8H Marifer Boudreaux PA-C   10 mL at 06/21/24 0633    sodium  chloride 0.9% flush 10 mL  10 mL Intravenous PRN Rey Lieberman MD        sodium chloride 0.9% flush 3 mL  3 mL Intravenous PRN Marifer Boudreaux PA-C          Prior to Admission medications    Medication Sig Start Date End Date Taking? Authorizing Provider   acetaminophen (TYLENOL) 500 MG tablet Take 2 tablets (1,000 mg total) by mouth every 8 (eight) hours as needed for Pain. 9/10/21   Prasanna Montes MD   allopurinoL (ZYLOPRIM) 300 MG tablet TAKE ONE TABLET BY MOUTH EVERY DAY 1/22/24   Anthony New NP   aspirin (ECOTRIN) 81 MG EC tablet Take 1 tablet (81 mg total) by mouth once daily. 9/10/21   Prasanna Montes MD   atorvastatin (LIPITOR) 40 MG tablet Take 40 mg by mouth every evening.  Patient not taking: Reported on 6/5/2024    Provider, Historical   blood sugar diagnostic Strp To check BG 6 times daily, to use with insurance preferred meter 9/29/23   Giuliana Montero NP   blood-glucose meter kit To check BG 6 times daily, to use with insurance preferred meter 9/29/23   Giuliana Montero NP   blood-glucose meter,continuous (DEXCOM G7 ) Misc Use as directed. 9/29/23   Giuliana Montero NP   blood-glucose sensor (DEXCOM G7 SENSOR) Nicole Change every 10 days. 2/29/24   Giuliana Montero NP   cloNIDine (CATAPRES) 0.1 MG tablet Take 1 tablet (0.1 mg total) by mouth 2 (two) times daily. 2/27/24   Chun Zapata MD   dicyclomine (BENTYL) 10 MG capsule Take 10 mg by mouth 3 (three) times daily.  Patient not taking: Reported on 6/20/2024    Provider, Historical   evolocumab (REPATHA SURECLICK) 140 mg/mL PnIj Inject 1 mL (140 mg total) into the skin every 14 (fourteen) days. 2/5/24   Jose L Méndez MD   fenofibrate micronized (LOFIBRA) 134 MG Cap Take 1 capsule (134 mg total) by mouth daily with breakfast. 5/6/24 5/6/25  Chun Zapata MD   fluticasone propionate (FLONASE) 50 mcg/actuation nasal spray Inhale 1 spray every day by intranasal route as needed.  Patient not taking:  Reported on 5/28/2024    Provider, Historical   HYDROcodone-acetaminophen (NORCO)  mg per tablet Take 1 tablet by mouth every 8 (eight) hours as needed for Pain. 6/12/24 7/12/24  Holden Pereira MD   hydrocortisone (ANUSOL-HC) 2.5 % rectal cream Procto-Med HC 2.5 % topical cream perineal applicator    Provider, Historical   insulin aspart U-100 (NOVOLOG FLEXPEN U-100 INSULIN) 100 unit/mL (3 mL) InPn pen Inject 14 Units into the skin 3 (three) times daily with meals. + meal correction scale. Max 40 units 11/13/23   Giuliana Montero NP   insulin detemir U-100, Levemir, (LEVEMIR FLEXTOUCH U100 INSULIN) 100 unit/mL (3 mL) InPn pen Inject 20 Units into the skin once daily AND 18 Units every evening. 11/13/23 11/12/24  Giuliana Montero NP   ipratropium-albuteroL (COMBIVENT)  mcg/actuation inhaler Inhale 1 puff into the lungs by mouth every 6 (six) hours. 5/20/24   Chun Zapata MD   lanc[place] [facility] [place] 6 times daily, to use with insurance preferred meter 9/29/23   Giuliana Montero NP   losartan (COZAAR) 50 MG tablet Take 1 tablet (50 mg total) by mouth once daily. 2/27/24 2/26/25  Chun Zapata MD   methocarbamoL (ROBAXIN) 500 MG Tab TAKE ONE TABLET BY MOUTH 3 TIMES DAILY AS NEEDED FOR MUSCLE SPASMS 4/16/24   Elvira Hylton NP   metoclopramide HCl (REGLAN) 5 MG tablet TAKE ONE TABLET BY MOUTH FOUR TIMES DAILY AS NEEDED FOR nausea prevention 6/12/24   Chun Zapata MD   metoprolol succinate (TOPROL-XL) 100 MG 24 hr tablet Take 1 tablet (100 mg total) by mouth once daily. 5/23/24   Yoli Lombardi, ANASTASIIA   mupirocin (BACTROBAN) 2 % ointment Apply topically 3 (three) times daily as needed (skin breakdown). 2/29/24   Chun Zapata MD   NIFEdipine (PROCARDIA-XL) 30 MG (OSM) 24 hr tablet Take 1 tablet (30 mg total) by mouth 2 (two) times a day. 2/27/24 2/26/25  Chun Zapata MD   nitroGLYCERIN (NITROSTAT) 0.4 MG SL tablet Place 1 tablet (0.4 mg total) under the  "tongue every 5 (five) minutes as needed for Chest pain.  Patient not taking: Reported on 5/28/2024 9/21/23   Jose L Méndez MD   pantoprazole (PROTONIX) 40 MG tablet Take 1 tablet (40 mg total) by mouth once daily.  Patient not taking: Reported on 5/15/2024 9/11/21   Prasanna Montes MD   pen needle, diabetic 31 gauge x 1/4" Ndle 1 each by Misc.(Non-Drug; Combo Route) route 5 (five) times daily. 11/16/23   Giuliana Montero NP   pioglitazone (ACTOS) 15 MG tablet Take 15 mg by mouth once daily.    Provider, Historical   pregabalin (LYRICA) 150 MG capsule Take 1 capsule (150 mg total) by mouth 4 (four) times daily. TAKE ONE CAPSULE BY MOUTH 3 TIMES DAILY 3/14/24 6/12/24  Holden Pereira MD   senna-docusate 8.6-50 mg (PERICOLACE) 8.6-50 mg per tablet Take 1 tablet by mouth 2 (two) times daily as needed for Constipation. 9/10/21   Prasanna Montes MD   sertraline (ZOLOFT) 100 MG tablet Take 1 tablet (100 mg total) by mouth once daily. 8/9/23   Chun Zapata MD   teriparatide 20 mcg/dose (620mcg/2.48mL) PnIj Inject 20 mcg into the skin once daily. Discard remainder after 28 days of use. 5/7/24   Giuliana Montero NP   torsemide (DEMADEX) 20 MG Tab Take 1 tablet (20 mg total) by mouth once daily. 6/5/24 6/5/25  Chun Zapata MD   triamcinolone acetonide 0.1% (KENALOG) 0.1 % cream Apply to affected areas of body twice daily as needed rash. Do not use on face, underarms, or groin. 8/22/22   Connie Ernandez MD   blood-glucose transmitter (DEXCOM G6 TRANSMITTER) Nicole 1 each by Misc.(Non-Drug; Combo Route) route continuous prn. 10/29/21 9/29/23  Giuliana Montero NP       History  Past Medical History:   Diagnosis Date    Arthritis     Back pain     Cancer     ovarian    Cervical cancer     Coronary artery disease     Depression     Diabetes mellitus     Fibromyalgia     Heart attack     History of MI (myocardial infarction)     Hyperlipidemia     Hypertension     Lupus     Stroke     slight left sided " weakness     Past Surgical History:   Procedure Laterality Date    APPENDECTOMY       SECTION      2    CORONARY ANGIOGRAPHY N/A 2022    Procedure: ANGIOGRAM, CORONARY ARTERY;  Surgeon: Jose L Méndez MD;  Location: [facility] CATH LAB;  Service: Cardiology;  Laterality: N/A;    HYSTERECTOMY      with USO for cervical cancer    INJECTION OF ANESTHETIC AGENT AROUND NERVE Bilateral 2021    Procedure: BLOCK, NERVE, SYMPATHIC;  Surgeon: Holden Pereira MD;  Location: [facility] PAIN MGT;  Service: Pain Management;  Laterality: Bilateral;    INJECTION OF ANESTHETIC AGENT AROUND NERVE N/A 2021    Procedure: BLOCK, NERVE, SYMPATHETIC  need consent;  Surgeon: Holden Pereira MD;  Location: [facility] PAIN MGT;  Service: Pain Management;  Laterality: N/A;    YARED LINK,SWALLOW FUNCTION,CINE/VIDEO RECORD  2021         TONSILLECTOMY      TRIAL OF SPINAL CORD NERVE STIMULATOR N/A 3/8/2023    Procedure: LUMBAR SPINAL CORD STIMULATOR TRIAL NEVRO REP PATIENT STATES SHE NO LONGER TAKES PLAVIX;  Surgeon: Holden Pereira MD;  Location: [facility] PAIN MGT;  Service: Pain Management;  Laterality: N/A;     Social History     Socioeconomic History    Marital status:    Tobacco Use    Smoking status: Former     Current packs/day: 0.00     Types: Cigarettes     Quit date: 2020     Years since quitting: 3.6     Passive exposure: Past    Smokeless tobacco: Never   Substance and Sexual Activity    Alcohol use: Not Currently     Comment: occasionally    Drug use: Yes     Types: Hydrocodone     Comment: three times a day    Sexual activity: Not Currently   Social History Narrative    Lives with daughter. .      Social Determinants of Health     Financial Resource Strain: Low Risk  (2024)    Overall Financial Resource Strain (CARDIA)     Difficulty of Paying Living Expenses: Not very hard   Food Insecurity: No Food Insecurity (2024)    Hunger Vital Sign     Worried About Running Out of Food in the Last  Year: Never true     Ran Out of Food in the Last Year: Never true   Transportation Needs: No Transportation Needs (2/27/2024)    PRAPARE - Transportation     Lack of Transportation (Medical): No     Lack of Transportation (Non-Medical): No   Physical Activity: Insufficiently Active (5/22/2024)    Exercise Vital Sign     Days of Exercise per Week: 4 days     Minutes of Exercise per Session: 10 min   Stress: No Stress Concern Present (5/22/2024)    [employer] [place] [employer] - Occupational Stress Questionnaire     Feeling of Stress : Only a little   Housing Stability: Unknown (2/27/2024)    Housing Stability Vital Sign     Unable to Pay for Housing in the Last Year: No     Unstable Housing in the Last Year: No     Family History   Problem Relation Name Age of Onset    COPD Mother      Lupus Mother      Hernia Mother      Uterine cancer Mother          vs cervical cancer    Ovarian cancer Mother      Diabetes Father      Coronary artery disease Father      Colon cancer Maternal Grandmother          in her 50's        Allergies  Review of patient's allergies indicates:   Allergen Reactions    Bleach (sodium hypochlorite) Shortness Of Breath    Nitrofurantoin macrocrystalline Anaphylaxis    Lipitor [atorvastatin] Diarrhea and Rash    Nsaids (non-steroidal anti-inflammatory drug)      Tolerates aspirin      Pcn [penicillins]     Toradol [ketorolac]        Review of Systems   Review of Systems   Constitutional: Negative for chills, fever and malaise/fatigue.   HENT:  Negative for nosebleeds.    Eyes:  Negative for blurred vision, double vision, vision loss in left eye and vision loss in right eye.   Cardiovascular:  Positive for chest pain and dyspnea on exertion. Negative for claudication, irregular heartbeat, leg swelling, near-syncope, orthopnea, palpitations, paroxysmal nocturnal dyspnea and syncope.   Respiratory:  Negative for cough, hemoptysis, shortness of breath and wheezing.    Endocrine: Negative  for cold intolerance and heat intolerance.   Hematologic/Lymphatic: Negative for bleeding problem. Does not bruise/bleed easily.   Skin:  Negative for color change.   Musculoskeletal:  Negative for falls, muscle weakness and myalgias.   Gastrointestinal:  Negative for abdominal pain, heartburn, hematemesis, hematochezia, hemorrhoids, jaundice, melena, nausea and vomiting.   Genitourinary:  Negative for dysuria, hematuria and nocturia.   Neurological:  Negative for dizziness, focal weakness, headaches, light-headedness, loss of balance, numbness, vertigo and weakness.   Psychiatric/Behavioral:  Negative for altered mental status, depression and memory loss. The patient is not nervous/anxious.    Allergic/Immunologic: Negative for hives and persistent infections.       Physical Exam    Temp:  [98.9 °F (37.2 °C)]   Pulse:  [100-122]   Resp:  [17-29]   BP: (105-193)/(54-90)   SpO2:  [94 %-100 %]    Wt Readings from Last 1 Encounters:   06/21/24 90.7 kg (200 lb)     Physical Exam  Vitals and nursing note reviewed.   Constitutional:       General: She is not in acute distress.     Appearance: She is obese. She is not toxic-appearing or diaphoretic.      Interventions: Nasal cannula in place.   HENT:      Head: Normocephalic and atraumatic.      Mouth/Throat:      Lips: Pink.      Mouth: Mucous membranes are moist.   Eyes:      General: No scleral icterus.     Conjunctiva/sclera: Conjunctivae normal.   Neck:      Thyroid: No thyromegaly.      Vascular: No carotid bruit, hepatojugular reflux or JVD.      Trachea: Trachea normal.   Cardiovascular:      Rate and Rhythm: Normal rate and regular rhythm. No extrasystoles are present.     Chest Wall: PMI is not displaced.      Pulses:           Carotid pulses are 2+ on the right side and 2+ on the left side.       Radial pulses are 2+ on the right side and 2+ on the left side.      Heart sounds: S1 normal and S2 normal. No murmur heard.     No friction rub. No S3 or S4 sounds.    Pulmonary:      Effort: Pulmonary effort is normal. No accessory muscle usage or respiratory distress.      Breath sounds: Normal breath sounds and air entry. No decreased breath sounds, wheezing, rhonchi or rales.   Abdominal:      General: Bowel sounds are normal. There is no distension or abdominal bruit.      Palpations: Abdomen is soft. There is no hepatomegaly, splenomegaly or pulsatile mass.      Tenderness: There is no abdominal tenderness.   Musculoskeletal:         General: No tenderness or deformity.      Right lower leg: No edema.      Left lower leg: No edema.   Skin:     General: Skin is warm and dry.      Capillary Refill: Capillary refill takes less than 2 seconds.      Coloration: Skin is not cyanotic or pale.      Nails: There is no clubbing.   Neurological:      General: No focal deficit present.      Mental Status: She is alert and oriented to person, place, and time.   Psychiatric:         Attention and Perception: Attention normal.         Mood and Affect: Mood normal.         Speech: Speech normal.         Behavior: Behavior normal. Behavior is cooperative.         Telemetry  Sinus rhythm    EKG  First electrocardiogram showed sinus rhythm with nonspecific ST abnormality.  Overnight with her elevated blood pressure worsening symptoms, electrocardiogram showed sinus tachycardia with widening of the QRS complex    Echocardiogram    Left Ventricle: The left ventricle is normal in size. Normal wall thickness. Global hypokinesis present. There is mildly reduced systolic function with a visually estimated ejection fraction of 40 - 50%. There is normal diastolic function.    Right Ventricle: Normal right ventricular cavity size. Wall thickness is normal. Systolic function is normal.    Pulmonary Artery: There is mild pulmonary hypertension. The estimated pulmonary artery systolic pressure is 45 mmHg.    Labs  Recent Results (from the past 72 hour(s))   PTH, Intact    Collection Time: 06/20/24 11:03  AM   Result Value Ref Range    PTH, Intact 14.2 9.0 - 77.0 pg/mL   CBC Without Differential    Collection Time: 06/20/24 11:03 AM   Result Value Ref Range    WBC 12.42 3.90 - 12.70 K/uL    RBC 3.76 (L) 4.00 - 5.40 M/uL    Hemoglobin 10.3 (L) 12.0 - 16.0 g/dL    Hematocrit 33.0 (L) 37.0 - 48.5 %    MCV 88 82 - 98 fL    MCH 27.4 27.0 - 31.0 pg    MCHC 31.2 (L) 32.0 - 36.0 g/dL    RDW 15.9 (H) 11.5 - 14.5 %    Platelets 279 150 - 450 K/uL    MPV 11.9 9.2 - 12.9 fL   Comprehensive Metabolic Panel    Collection Time: 06/20/24 11:03 AM   Result Value Ref Range    Sodium 131 (L) 136 - 145 mmol/L    Potassium 4.5 3.5 - 5.1 mmol/L    Chloride 96 95 - 110 mmol/L    CO2 21 (L) 23 - 29 mmol/L    Glucose 325 (H) 70 - 110 mg/dL    BUN 35 (H) 8 - 23 mg/dL    Creatinine 1.9 (H) 0.5 - 1.4 mg/dL    Calcium 9.5 8.7 - 10.5 mg/dL    Total Protein 6.9 6.0 - 8.4 g/dL    Albumin 3.5 3.5 - 5.2 g/dL    Total Bilirubin 0.2 0.1 - 1.0 mg/dL    Alkaline Phosphatase 54 (L) 55 - 135 U/L    AST 14 10 - 40 U/L    ALT 14 10 - 44 U/L    eGFR 27 (A) >60 mL/min/1.73 m^2    Anion Gap 14 8 - 16 mmol/L   D-DIMER, QUANTITATIVE    Collection Time: 06/20/24 11:03 AM   Result Value Ref Range    D-Dimer 0.27 <0.50 mg/L FEU   B-TYPE NATRIURETIC PEPTIDE    Collection Time: 06/20/24 11:03 AM   Result Value Ref Range     (H) 0 - 99 pg/mL   IN OFFICE EKG 12-LEAD (to [place])    Collection Time: 06/20/24  2:07 PM   Result Value Ref Range    QRS Duration 82 ms    [facility] QTC Calculation 462 ms   CBC auto differential    Collection Time: 06/20/24  4:08 PM   Result Value Ref Range    WBC 11.59 3.90 - 12.70 K/uL    RBC 3.72 (L) 4.00 - 5.40 M/uL    Hemoglobin 10.4 (L) 12.0 - 16.0 g/dL    Hematocrit 32.0 (L) 37.0 - 48.5 %    MCV 86 82 - 98 fL    MCH 28.0 27.0 - 31.0 pg    MCHC 32.5 32.0 - 36.0 g/dL    RDW 15.9 (H) 11.5 - 14.5 %    Platelets 276 150 - 450 K/uL    MPV 11.2 9.2 - 12.9 fL    Immature Granulocytes 0.7 (H) 0.0 - 0.5 %    Gran # (ANC) 6.9 1.8 - 7.7 K/uL     Immature Grans (Abs) 0.08 (H) 0.00 - 0.04 K/uL    Lymph # 3.0 1.0 - 4.8 K/uL    Mono # 0.9 0.3 - 1.0 K/uL    Eos # 0.7 (H) 0.0 - 0.5 K/uL    Baso # 0.07 0.00 - 0.20 K/uL    nRBC 0 0 /100 WBC    Gran % 59.1 38.0 - 73.0 %    Lymph % 25.7 18.0 - 48.0 %    Mono % 7.9 4.0 - 15.0 %    Eosinophil % 6.0 0.0 - 8.0 %    Basophil % 0.6 0.0 - 1.9 %    Differential Method Automated    Comprehensive metabolic panel    Collection Time: 06/20/24  4:08 PM   Result Value Ref Range    Sodium 133 (L) 136 - 145 mmol/L    Potassium 5.0 3.5 - 5.1 mmol/L    Chloride 95 95 - 110 mmol/L    CO2 22 (L) 23 - 29 mmol/L    Glucose 382 (H) 70 - 110 mg/dL    BUN 33 (H) 8 - 23 mg/dL    Creatinine 1.9 (H) 0.5 - 1.4 mg/dL    Calcium 9.2 8.7 - 10.5 mg/dL    Total Protein 7.1 6.0 - 8.4 g/dL    Albumin 3.5 3.5 - 5.2 g/dL    Total Bilirubin 0.1 0.1 - 1.0 mg/dL    Alkaline Phosphatase 53 (L) 55 - 135 U/L    AST 22 10 - 40 U/L    ALT 15 10 - 44 U/L    eGFR 27 (A) >60 mL/min/1.73 m^2    Anion Gap 16 8 - 16 mmol/L   Troponin I #1    Collection Time: 06/20/24  4:08 PM   Result Value Ref Range    Troponin I 0.009 0.000 - 0.026 ng/mL   B-Type natriuretic peptide (BNP)    Collection Time: 06/20/24  4:08 PM   Result Value Ref Range     (H) 0 - 99 pg/mL   Troponin I #2    Collection Time: 06/20/24  6:06 PM   Result Value Ref Range    Troponin I 0.006 0.000 - 0.026 ng/mL   Urinalysis, Reflex to Urine Culture Urine, Clean Catch    Collection Time: 06/20/24  9:37 PM    Specimen: Urine   Result Value Ref Range    Specimen UA Urine, Clean Catch     Color, UA Colorless (A) Yellow, Straw, Aye    Appearance, UA Clear Clear    pH, UA 6.0 5.0 - 8.0    Specific Gravity, UA 1.010 1.005 - 1.030    Protein, UA Negative Negative    Glucose, UA 4+ (A) Negative    Ketones, UA Negative Negative    Bilirubin (UA) Negative Negative    Occult Blood UA Negative Negative    Nitrite, UA Negative Negative    Urobilinogen, UA Negative <2.0 EU/dL    Leukocytes, UA 1+ (A) Negative    Urinalysis Microscopic    Collection Time: 06/20/24  9:37 PM   Result Value Ref Range    WBC, UA 7 (H) 0 - 5 /hpf    Bacteria Occasional None-Occ /hpf    Yeast, UA None None    Squam Epithel, UA 2 /hpf    Hyaline Casts, UA 0 0-1/lpf /lpf    Microscopic Comment SEE COMMENT    POCT Influenza A/B Molecular    Collection Time: 06/21/24  1:03 AM   Result Value Ref Range    POC Molecular Influenza A Ag Negative Negative    POC Molecular Influenza B Ag Negative Negative     Acceptable Yes    POCT COVID-19 Rapid Screening    Collection Time: 06/21/24  1:04 AM   Result Value Ref Range    POC Rapid COVID Negative Negative     Acceptable Yes    ISTAT PROCEDURE    Collection Time: 06/21/24  1:56 AM   Result Value Ref Range    POC PH 7.018 (LL) 7.35 - 7.45    POC PCO2 63.2 (HH) 35 - 45 mmHg    POC PO2 96 80 - 100 mmHg    POC HCO3 16.3 (L) 24 - 28 mmol/L    POC BE -15 (L) -2 to 2 mmol/L    POC SATURATED O2 92 95 - 100 %    POC Sodium 130 (L) 136 - 145 mmol/L    POC Potassium 4.9 3.5 - 5.1 mmol/L    POC TCO2 18 (L) 23 - 27 mmol/L    POC Ionized Calcium 1.27 1.06 - 1.42 mmol/L    POC Hematocrit 47 36 - 54 %PCV    POC HEMOGLOBIN 16 g/dL    Sample ARTERIAL    D dimer, quantitative    Collection Time: 06/21/24  2:03 AM   Result Value Ref Range    D-Dimer 0.75 (H) <0.50 mg/L FEU   EKG 12-lead    Collection Time: 06/21/24  2:16 AM   Result Value Ref Range    QRS Duration 106 ms    [facility]HS QTC Calculation 469 ms   ISTAT PROCEDURE    Collection Time: 06/21/24  2:39 AM   Result Value Ref Range    POC PH 7.283 (LL) 7.35 - 7.45    POC PCO2 38.4 35 - 45 mmHg    POC PO2 136 (H) 80 - 100 mmHg    POC HCO3 18.2 (L) 24 - 28 mmol/L    POC BE -9 (L) -2 to 2 mmol/L    POC SATURATED O2 99 95 - 100 %    POC TCO2 19 (L) 23 - 27 mmol/L    Sample ARTERIAL    EKG 12-lead    Collection Time: 06/21/24  3:16 AM   Result Value Ref Range    QRS Duration 90 ms    [facility]HS QTC Calculation 466 ms   Basic Metabolic Panel (BMP)    Collection  Time: 06/21/24  4:09 AM   Result Value Ref Range    Sodium 129 (L) 136 - 145 mmol/L    Potassium 5.3 (H) 3.5 - 5.1 mmol/L    Chloride 95 95 - 110 mmol/L    CO2 11 (L) 23 - 29 mmol/L    Glucose 842 (HH) 70 - 110 mg/dL    BUN 35 (H) 8 - 23 mg/dL    Creatinine 2.1 (H) 0.5 - 1.4 mg/dL    Calcium 8.7 8.7 - 10.5 mg/dL    Anion Gap 23 (H) 8 - 16 mmol/L    eGFR 24 (A) >60 mL/min/1.73 m^2   Magnesium    Collection Time: 06/21/24  4:09 AM   Result Value Ref Range    Magnesium 2.5 1.6 - 2.6 mg/dL   POCT glucose    Collection Time: 06/21/24  4:59 AM   Result Value Ref Range    POCT Glucose >500 (HH) 70 - 110 mg/dL   POCT glucose    Collection Time: 06/21/24  5:01 AM   Result Value Ref Range    POCT Glucose >500 (HH) 70 - 110 mg/dL   POCT glucose    Collection Time: 06/21/24  8:12 AM   Result Value Ref Range    POCT Glucose >500 (HH) 70 - 110 mg/dL   POCT glucose    Collection Time: 06/21/24  8:33 AM   Result Value Ref Range    POCT Glucose >500 (HH) 70 - 110 mg/dL   Beta-Hydroxybutyrate, Serum    Collection Time: 06/21/24  8:35 AM   Result Value Ref Range    Beta-Hydroxybutyrate 3.8 (H) 0.0 - 0.5 mmol/L   Basic Metabolic Panel (BMP)    Collection Time: 06/21/24  9:13 AM   Result Value Ref Range    Sodium 132 (L) 136 - 145 mmol/L    Potassium 5.2 (H) 3.5 - 5.1 mmol/L    Chloride 95 95 - 110 mmol/L    CO2 15 (L) 23 - 29 mmol/L    Glucose 901 (HH) 70 - 110 mg/dL    BUN 41 (H) 8 - 23 mg/dL    Creatinine 2.2 (H) 0.5 - 1.4 mg/dL    Calcium 9.0 8.7 - 10.5 mg/dL    Anion Gap 22 (H) 8 - 16 mmol/L    eGFR 22 (A) >60 mL/min/1.73 m^2   Lactic Acid, Plasma    Collection Time: 06/21/24  9:13 AM   Result Value Ref Range    Lactate (Lactic Acid) 6.0 (HH) 0.5 - 2.2 mmol/L   APTT    Collection Time: 06/21/24  9:14 AM   Result Value Ref Range    aPTT 24.4 21.0 - 32.0 sec   Protime-INR    Collection Time: 06/21/24  9:14 AM   Result Value Ref Range    Prothrombin Time 10.9 9.0 - 12.5 sec    INR 1.0 0.8 - 1.2   CBC auto differential    Collection  Time: 06/21/24  9:14 AM   Result Value Ref Range    WBC 15.68 (H) 3.90 - 12.70 K/uL    RBC 4.10 4.00 - 5.40 M/uL    Hemoglobin 11.3 (L) 12.0 - 16.0 g/dL    Hematocrit 36.2 (L) 37.0 - 48.5 %    MCV 88 82 - 98 fL    MCH 27.6 27.0 - 31.0 pg    MCHC 31.2 (L) 32.0 - 36.0 g/dL    RDW 15.9 (H) 11.5 - 14.5 %    Platelets 345 150 - 450 K/uL    MPV 11.7 9.2 - 12.9 fL    Immature Granulocytes 1.0 (H) 0.0 - 0.5 %    Gran # (ANC) 14.3 (H) 1.8 - 7.7 K/uL    Immature Grans (Abs) 0.15 (H) 0.00 - 0.04 K/uL    Lymph # 0.8 (L) 1.0 - 4.8 K/uL    Mono # 0.4 0.3 - 1.0 K/uL    Eos # 0.0 0.0 - 0.5 K/uL    Baso # 0.04 0.00 - 0.20 K/uL    nRBC 0 0 /100 WBC    Gran % 91.2 (H) 38.0 - 73.0 %    Lymph % 4.8 (L) 18.0 - 48.0 %    Mono % 2.7 (L) 4.0 - 15.0 %    Eosinophil % 0.0 0.0 - 8.0 %    Basophil % 0.3 0.0 - 1.9 %    Differential Method Automated    CBC with Automated Differential    Collection Time: 06/21/24  9:14 AM   Result Value Ref Range    WBC 15.68 (H) 3.90 - 12.70 K/uL    RBC 4.10 4.00 - 5.40 M/uL    Hemoglobin 11.3 (L) 12.0 - 16.0 g/dL    Hematocrit 36.2 (L) 37.0 - 48.5 %    MCV 88 82 - 98 fL    MCH 27.6 27.0 - 31.0 pg    MCHC 31.2 (L) 32.0 - 36.0 g/dL    RDW 15.9 (H) 11.5 - 14.5 %    Platelets 345 150 - 450 K/uL    MPV 11.7 9.2 - 12.9 fL    Immature Granulocytes 1.0 (H) 0.0 - 0.5 %    Gran # (ANC) 14.3 (H) 1.8 - 7.7 K/uL    Immature Grans (Abs) 0.15 (H) 0.00 - 0.04 K/uL    Lymph # 0.8 (L) 1.0 - 4.8 K/uL    Mono # 0.4 0.3 - 1.0 K/uL    Eos # 0.0 0.0 - 0.5 K/uL    Baso # 0.04 0.00 - 0.20 K/uL    nRBC 0 0 /100 WBC    Gran % 91.2 (H) 38.0 - 73.0 %    Lymph % 4.8 (L) 18.0 - 48.0 %    Mono % 2.7 (L) 4.0 - 15.0 %    Eosinophil % 0.0 0.0 - 8.0 %    Basophil % 0.3 0.0 - 1.9 %    Differential Method Automated    Magnesium    Collection Time: 06/21/24  9:14 AM   Result Value Ref Range    Magnesium 2.4 1.6 - 2.6 mg/dL   Potassium - if not ordered in last 24 hours    Collection Time: 06/21/24  9:14 AM   Result Value Ref Range    Potassium 5.2 (H)  3.5 - 5.1 mmol/L   Procalcitonin    Collection Time: 06/21/24  9:14 AM   Result Value Ref Range    Procalcitonin 1.66 (H) <0.25 ng/mL   POCT glucose    Collection Time: 06/21/24  9:27 AM   Result Value Ref Range    POCT Glucose >500 (HH) 70 - 110 mg/dL   Echo    Collection Time: 06/21/24 10:06 AM   Result Value Ref Range    BSA 1.96 m2    LVOT stroke volume 72.29 cm3    LVIDd 4.29 3.5 - 6.0 cm    LV Systolic Volume 60.16 mL    LV Systolic Volume Index 32.2 mL/m2    LVIDs 3.75 2.1 - 4.0 cm    LV Diastolic Volume 82.67 mL    LV Diastolic Volume Index 44.21 mL/m2    Left Ventricular End Systolic Volume by Teichholz Method 60.16 mL    Left Ventricular End Diastolic Volume by Teichholz Method 82.67 mL    IVS 0.82 0.6 - 1.1 cm    LVOT diameter 1.83 cm    LVOT area 2.6 cm2    FS 13 (A) 28 - 44 %    Left Ventricle Relative Wall Thickness 0.35 cm    Posterior Wall 0.75 0.6 - 1.1 cm    LV mass 102.33 g    LV Mass Index 55 g/m2    MV Peak E Calvin 1.56 m/s    TDI LATERAL 0.08 m/s    TDI SEPTAL 0.04 m/s    E/E' ratio 26.00 m/s    TR Max Calvin 3.25 m/s    IVRT 53.28 msec    E wave deceleration time 92.83 msec    LV SEPTAL E/E' RATIO 39.00 m/s    LV LATERAL E/E' RATIO 19.50 m/s    LVOT peak calvin 1.38 m/s    Left Ventricular Outflow Tract Mean Velocity 1.02 cm/s    Left Ventricular Outflow Tract Mean Gradient 4.38 mmHg    LA size 2.97 cm    Left Atrium Minor Axis 4.97 cm    Left Atrium Major Axis 4.82 cm    RA Major Axis 4.90 cm    AV mean gradient 6 mmHg    AV peak gradient 9 mmHg    Ao peak calvin 1.52 m/s    Ao VTI 28.00 cm    LVOT peak VTI 27.50 cm    AV valve area 2.58 cm²    AV Velocity Ratio 0.91     AV index (prosthetic) 0.98     PAT by Velocity Ratio 2.39 cm²    MV stenosis pressure 1/2 time 26.92 ms    MV valve area p 1/2 method 8.17 cm2    Triscuspid Valve Regurgitation Peak Gradient 42 mmHg    Mean e' 0.06 m/s    ZLVIDS 1.22     ZLVIDD -1.95     LA Volume Index 21.8 mL/m2    LA volume 40.77 cm3    LA WIDTH 3.3 cm    RA Width  4.9 cm    TV resting pulmonary artery pressure 45 mmHg    RV TB RVSP 6 mmHg    Est. RA pres 3 mmHg   POCT glucose    Collection Time: 06/21/24 10:23 AM   Result Value Ref Range    POCT Glucose >500 (HH) 70 - 110 mg/dL   POCT glucose    Collection Time: 06/21/24 11:41 AM   Result Value Ref Range    POCT Glucose >500 (HH) 70 - 110 mg/dL   POCT glucose    Collection Time: 06/21/24 12:47 PM   Result Value Ref Range    POCT Glucose >500 (HH) 70 - 110 mg/dL        Imaging  Echo    Result Date: 6/21/2024    Left Ventricle: The left ventricle is normal in size. Normal wall thickness. Global hypokinesis present. There is mildly reduced systolic function with a visually estimated ejection fraction of 40 - 50%. There is normal diastolic function.   Right Ventricle: Normal right ventricular cavity size. Wall thickness is normal. Systolic function is normal.   Pulmonary Artery: There is mild pulmonary hypertension. The estimated pulmonary artery systolic pressure is 45 mmHg.     X-Ray Knee 1 or 2 View Left    Result Date: 6/21/2024  EXAMINATION: XR KNEE 1 OR 2 VIEW LEFT CLINICAL HISTORY: left knee; TECHNIQUE: One or two views of the left knee were performed. COMPARISON: 03/14/2024. FINDINGS: There is osteopenia.  There is no acute fracture or dislocation. Alignment is normal. Joint spaces are preserved. No joint effusion.     No acute osseous abnormality of the knee. Electronically signed by: Ramin Krishnamurthy Date:    06/21/2024 Time:    08:40    X-Ray Chest AP Portable    Result Date: 6/21/2024  EXAMINATION: XR CHEST AP PORTABLE CLINICAL HISTORY: Acute Respiratory Failure; TECHNIQUE: Single frontal view of the chest was performed. COMPARISON: 06/20/2024 FINDINGS: Cardiac monitoring leads overlie the chest.  The cardiomediastinal silhouette is unchanged in size and configuration.  The lungs are symmetrically expanded with interval detrimental change in lung aeration with new bilateral mixed interstitial and more focal airspace  opacities with a bibasilar predominance.  Findings can be seen with edema, infectious or non-infectious inflammatory process.  No evidence of pneumothorax. This report was flagged in Epic as abnormal.     Please see above. Electronically signed by: Shantel Gonzalez MD Date:    06/21/2024 Time:    05:09    X-Ray Chest 1 View    Result Date: 6/20/2024  EXAMINATION: XR CHEST 1 VIEW CLINICAL HISTORY: SOB; TECHNIQUE: Single frontal view of the chest was performed. COMPARISON: 06/20/2024 FINDINGS: Lungs are well expanded.  No acute consolidation, pleural effusion, or pneumothorax. Cardiac silhouette is normal in size when accounting for technique.  Calcified plaque lines arch     No acute cardiopulmonary process seen. Electronically signed by: Karon Pennington Date:    06/20/2024 Time:    14:45    X-Ray Chest PA And Lateral    Result Date: 6/20/2024  EXAMINATION: XR CHEST PA AND LATERAL CLINICAL HISTORY: Shortness of breath TECHNIQUE: PA and lateral views of the chest were performed. COMPARISON: 05/22/2024 and 09/27/2022 FINDINGS: Cardiac silhouette is at the upper limits of normal size and unchanged.  Tortuous thoracic aorta and brachiocephalic vessels with atherosclerotic calcification in the wall of the aortic arch.  Pulmonary vascularity does not appear congested.  Lungs are satisfactorily expanded and appear free of active disease.  No pleural fluid or pneumothorax.  Accentuation of the thoracic kyphosis.  Soft tissues and osseous structures are otherwise unremarkable.     No acute cardiopulmonary disease and no significant interval change Electronically signed by: Alfred Vuong MD Date:    06/20/2024 Time:    12:08    X-Ray Chest 1 View    Result Date: 5/22/2024  EXAMINATION: XR CHEST 1 VIEW CLINICAL HISTORY: Shortness of breath TECHNIQUE: Single frontal view of the chest was performed. COMPARISON: 12/15/2023 FINDINGS: Lungs are well expanded.  No acute consolidation, pleural effusion, or pneumothorax. Cardiac silhouette is  normal in size.  Calcified plaque lines arch.     Stable chest with no acute cardiopulmonary process seen. Electronically signed by: Karon Pennington Date:    05/22/2024 Time:    14:44      Assessment    Acute on chronic combined systolic and diastolic heart failure   Exacerbation is likely ischemic due to hypoxemia and elevated blood pressure.    Coronary atherosclerosis   Not amenable to revascularization.  But certainly contributed to demand ischemia when her blood pressure was elevated and her oxygen saturation was low.    Plan and Discussion    Continue supportive care.  Resume her usual home cardiac regimen.  Will follow.      Jose L Méndez MD

## 2024-06-21 NOTE — CONSULTS
4F x 12 cm DL PowerMidline placed to right cephalic vein using real time ultrasound.  Good blood return.  Flushes with ease.  Sterile dressing applied.  CHG patch in place.  Patient tolerated well.  Baseline arm circumference is 41 cm.    LOT # [id number]

## 2024-06-21 NOTE — ED NOTES
Pt continues to remove Bipap mask, pt educated not to remove mask. Pt unable to follow commands, pt is not able to understands that the mask is helping her breath. Janusz BERKOWITZ at bedside and states to place pt in restraints.

## 2024-06-21 NOTE — PROGRESS NOTES
[facility] Intensive Care [facility] Medicine  Progress Note    Patient Name: Indira Waters  MRN: [id number]  Patient Class: IP- Inpatient   Admission Date: 6/20/2024  Length of Stay: 0 days  Attending Physician: Rey Lieberman MD  Primary Care Provider: Chun Zapata MD        Subjective:     Principal Problem:Acute respiratory failure with hypoxia and hypercapnia        HPI:  Ms. Indira Waters is a 79 y.o. female, with PMH of Chronic pain, Chronic airflow limitation, chronic bronchitis, former smoker, CAD, HTN, T2DM, Diabetic polyneuropathy, RA, GERD, MDD, anxiety, fibromyalgia, h/o cervical cancer, obesity, CDK-4, SLE, who [place] [facility] [place] from her PCPs office due to shortness of breath that occurs when it rains outside with associated hypoxia in the office. She states she has felt short of breath all week, and that at baseline she has FERRIS, which is now also occurring at rest and is associated with chest tightness. She further notes onset of lower extremity swelling. She denied fever, chills, cough. She does not utilize home O2. ED workup with labs including a CBC which shows anemia with H&H of 10.4/32.0, but no leukocytosis or left shift. A metabolic panel showed RANJEET with BUN 33, and Cr of 1.9 without hyperkalemia. CO2 was 22, and anion gap was 16, glucose was 382. BNP was 132, without elevation of troponin, and with CTX showing no acute cardiopulmonary process. A D-dimer was not elevated. She was treated in the ED with 3 DuoNebs, one hour long neb, and IV steroids. Initially she was maintaining adequate O2 sats on room air. At about midnight her BP jerri to 204/77, when she was apparently reporting pain, and then at about 01:40 she had an acute decline in O2 sats with worsened shortness of breath. An ABG was ordered and showed CO2 retention, she was placed on BiPap. Orders were changed from tele to ICU. She was placed on observation.     Overview/Hospital Course:  No  notes on file    Interval History: Noted events overnight.  This morning she feels better, but remains on bipap.  She notes pain in her left knee and appears quite weak.  All questions answered and patient had no further complaints.    Objective:     Vital Signs (Most Recent):  Temp: 98.9 °F (37.2 °C) (06/21/24 0310)  Pulse: (!) 112 (06/21/24 0759)  Resp: (!) 24 (06/21/24 0759)  BP: (!) 119/59 (06/21/24 0759)  SpO2: (!) 94 % (06/21/24 0759) Vital Signs (24h Range):  Temp:  [98 °F (36.7 °C)-98.9 °F (37.2 °C)] 98.9 °F (37.2 °C)  Pulse:  [] 112  Resp:  [14-29] 24  SpO2:  [91 %-100 %] 94 %  BP: (105-204)/(54-90) 119/59     Weight: 90.7 kg (200 lb)  Body mass index is 39.06 kg/m².    Intake/Output Summary (Last 24 hours) at 6/21/2024 1308  Last data filed at 6/21/2024 0631  Gross per 24 hour   Intake 50 ml   Output 1350 ml   Net -1300 ml         Physical Exam  Vitals and nursing note reviewed.   Constitutional:       General: She is not in acute distress.     Appearance: She is well-developed. She is obese. She is ill-appearing (chronically). She is not toxic-appearing or diaphoretic.   HENT:      Head: Normocephalic and atraumatic.      Mouth/Throat:      Mouth: Mucous membranes are dry.   Eyes:      Extraocular Movements: Extraocular movements intact.   Neck:      Trachea: No tracheal deviation.   Cardiovascular:      Rate and Rhythm: Regular rhythm. Tachycardia present.      Heart sounds: Normal heart sounds. No murmur heard.     No gallop.      Comments: Trace LLE edema.     Pulmonary:      Comments: Breathing comfortably on bipap, overall good air movement without wheezing, but with diffuse loud course rales  Abdominal:      General: Bowel sounds are normal. There is no distension.      Palpations: Abdomen is soft. There is no mass.      Tenderness: There is no abdominal tenderness. There is no guarding.   Musculoskeletal:         General: Swelling: left knee. Tenderness: left knee.      Cervical back: Normal  range of motion.      Comments: Lets are mildly edematous.  Left knee without myles swelling, warmth or redness and is not tender to superficial palpation   Skin:     General: Skin is warm and dry.      Coloration: Skin is not pale.      Findings: No erythema or rash.      Comments: Left knee with significant warm with palpation. No erythema or fluctuance with palpation.    Neurological:      General: No focal deficit present.      Mental Status: She is alert and oriented to person, place, and time.      Cranial Nerves: No cranial nerve deficit.      Motor: No abnormal muscle tone.   Psychiatric:         Mood and Affect: Mood normal.             Significant Labs: All pertinent labs within the past 24 hours have been reviewed.    Significant Imaging: I have reviewed all pertinent imaging results/findings within the past 24 hours.    Assessment/Plan:      * Acute respiratory failure with hypoxia and hypercapnia  -Admitted to inpatient status  -Presented with shortness of breath.  In ED had wheezing and treated with continuous nebulizers and steroids.  She was admitted with presumptive COPD exacerbation and overnight developed worsening left knee pain, spike in blood pressure and myles respiratory distress requiring transfer to ICU and treatment with bipap.  -Noted history of former tobacco use, chronic bronchitis, chronic airflow limitation, chronic systolic chf and obesity.  -CXR initially was clear but after spike in blood pressure and respiratory distress the cxr showed new bilateral mixed interstitial and more focal airspace opacities with a bibasilar predominance   -Initial ABG showed a severe respiratory acidosis with pH 7.0 and pCO2 63.  -Flu and covid swabs are negative.  Procalcitonin is elevated.  Lactic acid is elevated  -Echo shows EF 40-50%, normal diastolic function and mild pulmonary hypertension with PASP 45mmHg  -This is acute hypoxic and hypercapnic respiratory failure.  She is not on oxygen at  home  -Pulmonology consulted and input appreciated.  Doubt pulmonary embolism.  More likely flash pulmonary edema due to elevated blood pressure due to pain - all in context of quite poor reserve and chronic illness.  -Obtain sputum culture  -Attempt diuresis and blood pressure control.  -Treat pain as below.  -Noted leukocytosis but is afebrile and received steroids.  Check sputum culture if able.  Will hold off on antibiotics for now, but have low threshold to start.  -Attempt to wean from bipap today.  Continue supplemental oxygen and wean as able  -For now will hold off on further steroids due to DKA.    Acute on chronic combined systolic and diastolic congestive heart failure  -On admit appeared euvolemic.  BNP not significantly elevated and CXR without evidence of pulmonary edema  -Overnight developed respiratory distress associated with elevated blood pressure likely secondary to pain.  Repeat CXR now with evidence of pulmonary edema.  -Echo reviewed as above - notable for mildly depressed EF (previously normal) and normal diastolic function  -This is acute on chronic combined systolic and diastolic chf   -Strict ins/outs and daily weights  -Fluids difficulty to manage - need fluids for DKA but needs diuresis for chf and respiratory status.  Her respiratory status takes precedent here.  -Continue blood pressure control with clonidine, toprol and nifedipine.  -Agree with one time dose of iv lasix today - (home med list includes torsemide 20mg qd)  -Noted bump in creatinine - will hold losartan for now    Essential hypertension  -Bp normal initially but spiked overnight to 204/77 at [facility]   -BP now well controlled  -Continue clonidine, toprol and nifedipine  -Hold losartan for now given bump in creatinine  -Prn hdyralazine available for SBP > 180    Diabetic ketoacidosis without coma associated with type 2 diabetes mellitus  -A1c 9.5 6/5/24  -On admit hyperglycemic with blood sugar 325, but not in DKA  -Treated  with IV steroids in ED and now in DKA (Blood sugar 901, anion gap 23, BOHB 3.8)  -DKA pathway initiated  -Monitor accu check q1h and bmp q4h  -Continue to treat with insulin drip.  -Managing fluids is challenging given her chf and respiratory status.  Continue gentle IV fluids for now.  -Transition to subcutaneous insulin once anion gap closes and CO2 sufficiently improved.    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 pectoris  -Noted history  -Denies chest pain  -Echo without any notable segmental wall motion abnormalities  -Continue aspirin, toprol and statin     Chronic pain syndrome  -Noted history and follows with pain management.  -Home med list includes hydrocodone/acetaminophen 10/325 q8h prn, robaxin 500mg tid prn, lyrica 150mg TID  -Complains of left knee pain now but nothing obviously inflamed on my exam  -Check xray of left knee  -Continue allopurinol, hydrocodone, robaxin as at home  -Add lidoderm patch to left knee.  Consider resumption of lyrica if she remains stable  -Consult orthopedic surgery for evaluation of knee - might she benefit from steroid injection?    Diabetic polyneuropathy  -Takes lyrica at home - holding for now to ensure stability of respiratory status    Stage 4 chronic kidney disease  -Baseline Cr 1.2-1.5  -On admit Cr 1.9 and has had mild hyperkalemia and pseudohyponatremia due to hyperglycemia  -Cr now up to 2.2  -Avoid nephrotoxic agents and renally dose meds  -Appears euvolemic to mildly hypervolemic on exam - difficult to manage fluid balance given her DKA and CHF.  -Hold home torsemide and losartan for now  -Agree with trial of 1 time IV lasix 80mg  -Continue IV fluids for DKA as above.  -Check FEUrea, UA, urine protein creatine ratio, urine eosinophils and renal us.  -Repeat BMP q4h   -If worsening tomorrow will consult nephrology.    Systemic lupus erythematosus, unspecified  -History noted but no specific medications on home med  list  -Chart reviewed and last seen by Dr. 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  STEVE from 2022 was negative  -Unclear if she actually has lupus.    Rheumatoid arthritis, unspecified  -History noted in chart, but unclear if truly has RA  -RF and CCP negative 2022  -Refiewed last rheumatology note from 2022 and no evidence of connective tissue disorder or RA at that time.    -Unclear if she has had diagnosis at an outside location since 2022?  Seek collateral information.    Hemiplegia and hemiparesis following cerebral infarction affecting left non-dominant side  -History noted  -Fall precautions ordered  -Once clinically improved will consult PT/OT    Class 2 obesity with body mass index (BMI) of 39.0 to 39.9 in adult  Body mass index is 39.06 kg/m². Morbid obesity complicates all aspects of disease management from diagnostic modalities to treatment. Weight loss encouraged and health benefits explained to patient.    Noncompliance with medication regimen  -History noted       VTE Risk Mitigation (From admission, onward)           Ordered     heparin (porcine) injection 5,000 Units  Every 8 hours (non-standard times)         06/21/24 1008     IP VTE HIGH RISK PATIENT  Once         06/21/24 1008     Place SUHAS hose  Until discontinued         06/21/24 1008     Place sequential compression device  Until discontinued         06/21/24 1008     Place sequential compression device  Until discontinued         06/21/24 0010     Place sequential compression device  Until discontinued         06/20/24 2353                    Discharge Planning   KYLE:      Code Status: Full Code   Is the patient medically ready for discharge?:     Reason for patient still in hospital (select all that apply):  Treatment  Discharge Plan A: Home Health          45 minutes cc time today        Rey Lieberman MD  Department of Hospital Medicine   [facility] - Intensive [facility] ([place])

## 2024-06-21 NOTE — ASSESSMENT & PLAN NOTE
-History noted but no specific medications on home med list  -Chart reviewed and last seen by Dr. 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  STEVE from 2022 was negative  -Unclear if she actually has lupus.

## 2024-06-21 NOTE — ASSESSMENT & PLAN NOTE
-A1c 9.5 6/5/24  -On admit hyperglycemic with blood sugar 325, but not in DKA  -Treated with IV steroids in ED and now in DKA (Blood sugar 901, anion gap 23, BOHB 3.8)  -DKA pathway initiated  -Monitor accu check q1h and bmp q4h  -Continue to treat with insulin drip.  -Managing fluids is challenging given her chf and respiratory status.  Continue gentle IV fluids for now.  -Transition to subcutaneous insulin once anion gap closes and CO2 sufficiently improved.

## 2024-06-21 NOTE — ASSESSMENT & PLAN NOTE
-History noted in chart, but unclear if truly has RA  -RF and CCP negative 2022  -Refiewed last rheumatology note from 2022 and no evidence of connective tissue disorder or RA at that time.    -Unclear if she has had diagnosis at an outside location since 2022?  Seek collateral information.

## 2024-06-21 NOTE — EICU
eICU Intervention    Notified of glucose 842  K 5.3, creatinine 2.1    Ordered to start insulin drip for hyperglycemia  Check B

## 2024-06-21 NOTE — HPI
"Per Marifer Boudreaux PA-C:    "Ms. Indira Waters is a 79 y.o. female, with PMH of Chronic pain, Chronic airflow limitation, chronic bronchitis, former smoker, CAD, HTN, T2DM, Diabetic polyneuropathy, RA, GERD, MDD, anxiety, fibromyalgia, h/o cervical cancer, obesity, CDK-4, SLE, who presented to [facility] ED on 6/20/24 from her PCPs office due to shortness of breath that occurs when it rains outside with associated hypoxia in the office. She states she has felt short of breath all week, and that at baseline she has FERRIS, which is now also occurring at rest and is associated with chest tightness. She further notes onset of lower extremity swelling. She denied fever, chills, cough. She does not utilize home O2. ED workup with labs including a CBC which shows anemia with H&H of 10.4/32.0, but no leukocytosis or left shift. A metabolic panel showed RANJEET with BUN 33, and Cr of 1.9 without hyperkalemia. CO2 was 22, and anion gap was 16, glucose was 382. BNP was 132, without elevation of troponin, and with CTX showing no acute cardiopulmonary process. A D-dimer was not elevated. She was treated in the ED with 3 DuoNebs, one hour long neb, and IV steroids. Initially she was maintaining adequate O2 sats on room air. At about midnight her BP jerri to 204/77, when she was apparently reporting pain, and then at about 01:40 she had an acute decline in O2 sats with worsened shortness of breath. An ABG was ordered and showed CO2 retention, she was placed on BiPap. Orders were changed from tele to ICU."  "

## 2024-06-21 NOTE — ASSESSMENT & PLAN NOTE
Chronic, poorly controlled. Latest blood pressure and vitals reviewed-     Temp:  [98 °F (36.7 °C)]   Pulse:  []   Resp:  [14-18]   BP: (104-149)/(54-63)   SpO2:  [91 %-100 %] .   Home meds for hypertension were reviewed and noted below.   Hypertension Medications               cloNIDine (CATAPRES) 0.1 MG tablet Take 1 tablet (0.1 mg total) by mouth 2 (two) times daily.    losartan (COZAAR) 50 MG tablet Take 1 tablet (50 mg total) by mouth once daily.    metoprolol succinate (TOPROL-XL) 100 MG 24 hr tablet Take 1 tablet (100 mg total) by mouth once daily.    NIFEdipine (PROCARDIA-XL) 30 MG (OSM) 24 hr tablet Take 1 tablet (30 mg total) by mouth 2 (two) times a day.    nitroGLYCERIN (NITROSTAT) 0.4 MG SL tablet Place 1 tablet (0.4 mg total) under the tongue every 5 (five) minutes as needed for Chest pain.    torsemide (DEMADEX) 20 MG Tab Take 1 tablet (20 mg total) by mouth once daily.            While in the hospital, will manage blood pressure as follows; Continue home antihypertensive regimen    Will utilize p.r.n. blood pressure medication only if patient's blood pressure greater than 180/110 and she develops symptoms such as worsening chest pain or shortness of breath.

## 2024-06-21 NOTE — ASSESSMENT & PLAN NOTE
-Noted history  -Denies chest pain  -Echo without any notable segmental wall motion abnormalities  -Continue aspirin, toprol and statin

## 2024-06-21 NOTE — ASSESSMENT & PLAN NOTE
-On admit appeared euvolemic.  BNP not significantly elevated and CXR without evidence of pulmonary edema  -Overnight developed respiratory distress associated with elevated blood pressure likely secondary to pain.  Repeat CXR now with evidence of pulmonary edema.  -Echo reviewed as above - notable for mildly depressed EF (previously normal) and normal diastolic function  -This is acute on chronic combined systolic and diastolic chf   -Strict ins/outs and daily weights  -Fluids difficulty to manage - need fluids for DKA but needs diuresis for chf and respiratory status.  Her respiratory status takes precedent here.  -Continue blood pressure control with clonidine, toprol and nifedipine.  -Agree with one time dose of iv lasix today - (home med list includes torsemide 20mg qd)  -Noted bump in creatinine - will hold losartan for now

## 2024-06-21 NOTE — ASSESSMENT & PLAN NOTE
- Continue statin & ASA   - Patient with known CAD, which is controlled Will continue ASA and Statin and monitor for S/Sx of angina/ACS. Continue to monitor on telemetry.

## 2024-06-21 NOTE — ED NOTES
Patient yelling for nurse, Ismael STEWART entered room. Pt states that she can not breath, pt is on RA at this time. Ismael STEWART places pt on nasal cannula 3/L. Pt's oxygen saturation is dropping rapidly. Pt placed on non rebreather 15L. Janusz BERKOWITZ called and states to place pt on Bipap and to obtain a ABG. Resp called to bedside.

## 2024-06-21 NOTE — ASSESSMENT & PLAN NOTE
Patient is identified as having Diastolic (HFpEF) heart failure that is Chronic. CHF is currently controlled. Latest ECHO performed and demonstrates- Results for orders placed during the hospital encounter of 12/15/23    Echo    Interpretation Summary    Left Ventricle: The left ventricle is normal in size. Mildly increased wall thickness. Unable to assess wall motion. There is low normal systolic function with a visually estimated ejection fraction of 50 - 55%. There is indeterminate diastolic function.    Left Atrium: Left atrium is mildly dilated.    Aortic Valve: There is aortic valve sclerosis without clinically significant stenosis.    Mitral Valve: Mildly calcified anterior leaflet. There is mild posterior mitral annular calcification present. There is mild stenosis. The mean pressure gradient across the mitral valve is 3.6 mmHg at a heart rate of 79 bpm. There is trace to mild regurgitation.    Right Ventricle: Normal right ventricular cavity size. Systolic function is normal.    Technically difficult study with poor endocardial border visualization.  . Continue Beta Blocker and ACE/ARB and monitor clinical status closely. Monitor on telemetry. Patient is off CHF pathway.  Monitor strict Is&Os and daily weights.  Place on fluid restriction of 1.5 L. Cardiology has not been consulted. Continue to stress to patient importance of self efficacy and  on diet for CHF. Last BNP reviewed- and noted below   Recent Labs   Lab 06/20/24  1608   *

## 2024-06-21 NOTE — ASSESSMENT & PLAN NOTE
-Admitted to inpatient status  -Presented with shortness of breath.  In ED had wheezing and treated with continuous nebulizers and steroids.  She was admitted with presumptive COPD exacerbation and overnight developed worsening left knee pain, spike in blood pressure and myles respiratory distress requiring transfer to ICU and treatment with bipap.  -Noted history of former tobacco use, chronic bronchitis, chronic airflow limitation, chronic systolic chf and obesity.  -CXR initially was clear but after spike in blood pressure and respiratory distress the cxr showed new bilateral mixed interstitial and more focal airspace opacities with a bibasilar predominance   -Initial ABG showed a severe respiratory acidosis with pH 7.0 and pCO2 63.  -Flu and covid swabs are negative.  Procalcitonin is elevated.  Lactic acid is elevated  -Echo shows EF 40-50%, normal diastolic function and mild pulmonary hypertension with PASP 45mmHg  -This is acute hypoxic and hypercapnic respiratory failure.  She is not on oxygen at home  -Pulmonology consulted and input appreciated.  Doubt pulmonary embolism.  More likely flash pulmonary edema due to elevated blood pressure due to pain - all in context of quite poor reserve and chronic illness.  -Obtain sputum culture  -Attempt diuresis and blood pressure control.  -Treat pain as below.  -Noted leukocytosis but is afebrile and received steroids.  Check sputum culture if able.  Will hold off on antibiotics for now, but have low threshold to start.  -Attempt to wean from bipap today.  Continue supplemental oxygen and wean as able  -For now will hold off on further steroids due to DKA.

## 2024-06-21 NOTE — ASSESSMENT & PLAN NOTE
- Creatine stable for now. BMP reviewed- noted Estimated Creatinine Clearance: 24.5 mL/min (A) (based on SCr of 1.9 mg/dL (H)). according to latest data.  -  Based on current GFR, CKD stage is stage 4 - GFR 15-29.  Monitor UOP and serial BMP and adjust therapy as needed. Renally dose meds. Avoid nephrotoxic medications and procedures.

## 2024-06-21 NOTE — ASSESSMENT & PLAN NOTE
-Bp normal initially but spiked overnight to 204/77 at MN   -BP now well controlled  -Continue clonidine, toprol and nifedipine  -Hold losartan for now given bump in creatinine  -Prn hdyralazine available for SBP > 180

## 2024-06-21 NOTE — ASSESSMENT & PLAN NOTE
-Noted history and follows with pain management.  -Home med list includes hydrocodone/acetaminophen 10/325 q8h prn, robaxin 500mg tid prn, lyrica 150mg TID  -Complains of left knee pain now but nothing obviously inflamed on my exam  -Check xray of left knee  -Continue allopurinol, hydrocodone, robaxin as at home  -Add lidoderm patch to left knee.  Consider resumption of lyrica if she remains stable  -Consult orthopedic surgery for evaluation of knee - might she benefit from steroid injection?

## 2024-06-21 NOTE — ASSESSMENT & PLAN NOTE
Patient's FSGs are uncontrolled due to hyperglycemia on current medication regimen.  Last A1c reviewed-   Lab Results   Component Value Date    HGBA1C 9.5 (H) 06/05/2024     Most recent fingerstick glucose reviewed-   Recent Labs   Lab 06/21/24  0459 06/21/24  0501   POCTGLUCOSE >500* >500*     Current correctional scale  Low  Maintain anti-hyperglycemic dose as follows-   Antihyperglycemics (From admission, onward)      Start     Stop Route Frequency Ordered    06/21/24 0630  insulin regular in 0.9 % NaCl 100 unit/100 mL (1 unit/mL) infusion        Question:  Enter initial dose from Infusion Protocol Chart (Units/hr):  Answer:  3    -- IV Continuous 06/21/24 0526          Hold Oral hypoglycemics while patient is in the hospital.  - subsequent to treatment with IV steroids, the patient's hyperglycemia became markedly worse   - AM labs w/ evidence of DKA   - insulin drip initiated by eICU

## 2024-06-21 NOTE — PLAN OF CARE
Initial Assessment: 6/2`/24    Patient admitted on:6/20/24    Chart reviewed, Care plan discussed with treatment team, attending     PCP updated in Epic: correct in Epic    Pharmacy updated in [facility]    DME at home:st cane, rw, bsc, sh chair, wc    Current Dispo:home    Transportation:may need transportation home    POA/LW:has AP documents      Anticipated DC needs from CM perspecrive:to be determined   06/21/24 1314   Discharge Assessment   Assessment Type Discharge Planning Assessment   Confirmed/corrected address, phone number and insurance Yes   Confirmed Demographics Correct on Facesheet   Source of Information patient   When was your last doctors appointment? 06/20/24   Does patient/caregiver understand observation status Yes   Communicated KYLE with patient/caregiver Date not available/Unable to determine   Reason For Admission Respiratory failure   People in Home child(georges), adult   Facility Arrived From: MD office   Do you expect to return to your current living situation? Yes   Do you have help at home or someone to help you manage your care at home? Yes   Who are your caregiver(s) and their phone number(s)? Esha hebert, daughter, [phone]   Prior to hospitilization cognitive status: Alert/Oriented;No Deficits   Current cognitive status: Alert/Oriented;No Deficits   Walking or Climbing Stairs Difficulty yes   Walking or Climbing Stairs ambulation difficulty, requires equipment   Mobility Management St Jameel MCKEON   Dressing/Bathing Difficulty no   Home Accessibility wheelchair accessible   Home Layout Able to live on 1st floor   Equipment Currently Used at Home cane, straight;commode;shower chair;walker, rolling;wheelchair   Readmission within 30 days? No   Patient currently being followed by outpatient case management? No   Do you currently have service(s) that help you manage your care at home? No   Do you take prescription medications? Yes   Do you have any problems  affording any of your prescribed medications? TBD   Who is going to help you get home at discharge? family vs transportation   How do you get to doctors appointments? family or friend will provide   Are you on dialysis? No   Do you take coumadin? No   Discharge Plan A Home Health   Discharge Plan B Home Health   DME Needed Upon Discharge  none   Discharge Plan discussed with: Patient   Transition of Care Barriers None   Physical Activity   On average, how many days per week do you engage in moderate to strenuous exercise (like a brisk walk)? 0 days   On average, how many minutes do you engage in exercise at this level? 0 min   Financial Resource Strain   How hard is it for you to pay for the very basics like food, housing, medical care, and heating? Not very   Housing Stability   In the last 12 months, was there a time when you were not able to pay the mortgage or rent on time? N   At any time in the past 12 months, were you homeless or living in a shelter (including now)? N   Transportation Needs   Has the lack of transportation kept you from medical appointments, meetings, work or from getting things needed for daily living? No   Food Insecurity   Within the past 12 months, you worried that your food would run out before you got the money to buy more. Never true   Within the past 12 months, the food you bought just didn't last and you didn't have money to get more. Never true   Stress   Do you feel stress - tense, restless, nervous, or anxious, or unable to sleep at night because your mind is troubled all the time - these days? Only a littl   Social Isolation   How often do you feel lonely or isolated from those around you?  Never   Alcohol Use   Q1: How often do you have a drink containing alcohol? Monthly or l   Q2: How many drinks containing alcohol do you have on a typical day when you are drinking? 1 or 2   Q3: How often do you have six or more drinks on one occasion? Never   Utilities   In the past 12 months  has the electric, gas, oil, or water company threatened to shut off services in your home? No   Health Literacy   How often do you need to have someone help you when you read instructions, pamphlets, or other written material from your doctor or pharmacy? Never   OTHER   Name(s) of People in Home Daughter, other relativew     [facility] ([facility])  Initial Discharge Assessment       Primary Care Provider: Chun Zapata MD    Admission Diagnosis: SOB (shortness of breath) [R06.02]  Tachycardia [R00.0]    Admission Date: 6/20/2024  Expected Discharge Date:     Transition of Care Barriers: (P) None    Payor: MEDICARE / Plan: MEDICARE PART A & B / Product Type: Government /     Extended Emergency Contact Information  Primary Emergency Contact: Esha Severino  Address: [address] [place] [address] [facility]  Home Phone: [phone]  Relation: Daughter    Discharge Plan A: (P) Home Health  Discharge Plan B: (P) Home Health      [facility] [address]  [address]  [place] [place] [address]  Phone: [phone] Fax: [phone]    [facility]  [address]  [facility] [address]  Phone: [phone] Fax: [phone]    [id number] [place] [address] AT [facility]  [address] [place] [address]  Phone: [phone] Fax: [phone]    [facility]  [address]  [facility] [address]  Phone: [phone] Fax: [phone]      Initial Assessment (most recent)       Adult Discharge Assessment - 06/21/24 1314          Discharge Assessment    Assessment Type Discharge Planning Assessment (P)      Confirmed/corrected address, phone number and insurance Yes (P)      Confirmed Demographics Correct on Facesheet (P)      Source of Information patient (P)      When was your last doctors  appointment? 06/20/24 (P)      Does patient/caregiver understand observation status Yes (P)      Communicated KYLE with patient/caregiver Date not available/Unable to determine (P)      Reason For Admission Respiratory failure (P)      People in Home child(georges), adult (P)      Facility Arrived From: MD office (P)      Do you expect to return to your current living situation? Yes (P)      Do you have help at home or someone to help you manage your care at home? Yes (P)      Who are your caregiver(s) and their phone number(s)? Esha hebert, daughter, [phone] (P)      Prior to hospitilization cognitive status: Alert/Oriented;No Deficits (P)      Current cognitive status: Alert/Oriented;No Deficits (P)      Walking or Climbing Stairs Difficulty yes (P)      Walking or Climbing Stairs ambulation difficulty, requires equipment (P)      Mobility Management RW, St Cane (P)      Dressing/Bathing Difficulty no (P)      Home Accessibility wheelchair accessible (P)      Home Layout Able to live on 1st floor (P)      Equipment Currently Used at Home cane, straight;commode;shower chair;walker, rolling;wheelchair (P)      Readmission within 30 days? No (P)      Patient currently being followed by outpatient case management? No (P)      Do you currently have service(s) that help you manage your care at home? No (P)      Do you take prescription medications? Yes (P)      Do you have any problems affording any of your prescribed medications? TBD (P)      Who is going to help you get home at discharge? family vs transportation (P)      How do you get to doctors appointments? family or friend will provide (P)      Are you on dialysis? No (P)      Do you take coumadin? No (P)      Discharge Plan A Home Health (P)      Discharge Plan B Home Health (P)      DME Needed Upon Discharge  none (P)      Discharge Plan discussed with: Patient (P)      Transition of Care Barriers None (P)         Physical Activity    On average, how many  days per week do you engage in moderate to strenuous exercise (like a brisk walk)? 0 days (P)      On average, how many minutes do you engage in exercise at this level? 0 min (P)         Financial Resource Strain    How hard is it for you to pay for the very basics like food, housing, medical care, and heating? Not very hard (P)         Housing Stability    In the last 12 months, was there a time when you were not able to pay the mortgage or rent on time? No (P)      At any time in the past 12 months, were you homeless or living in a shelter (including now)? No (P)         Transportation Needs    Has the lack of transportation kept you from medical appointments, meetings, work or from getting things needed for daily living? No (P)         Food Insecurity    Within the past 12 months, you worried that your food would run out before you got the money to buy more. Never true (P)      Within the past 12 months, the food you bought just didn't last and you didn't have money to get more. Never true (P)         Stress    Do you feel stress - tense, restless, nervous, or anxious, or unable to sleep at night because your mind is troubled all the time - these days? Only a little (P)         Social Isolation    How often do you feel lonely or isolated from those around you?  Never (P)         Alcohol Use    Q1: How often do you have a drink containing alcohol? Monthly or less (P)      Q2: How many drinks containing alcohol do you have on a typical day when you are drinking? 1 or 2 (P)      Q3: How often do you have six or more drinks on one occasion? Never (P)         howsimple    In the past 12 months has the electric, gas, oil, or water company threatened to shut off services in your home? No (P)         Health Literacy    How often do you need to have someone help you when you read instructions, pamphlets, or other written material from your doctor or pharmacy? Never (P)         OTHER    Name(s) of People in Home Daughter,  other relativew (P)                                   Case management to follow for plans and arrangements

## 2024-06-21 NOTE — SUBJECTIVE & OBJECTIVE
Interval History: Noted events overnight.  This morning she feels better, but remains on bipap.  She notes pain in her left knee and appears quite weak.  All questions answered and patient had no further complaints.    Objective:     Vital Signs (Most Recent):  Temp: 98.9 °F (37.2 °C) (06/21/24 0310)  Pulse: (!) 112 (06/21/24 0759)  Resp: (!) 24 (06/21/24 0759)  BP: (!) 119/59 (06/21/24 0759)  SpO2: (!) 94 % (06/21/24 0759) Vital Signs (24h Range):  Temp:  [98 °F (36.7 °C)-98.9 °F (37.2 °C)] 98.9 °F (37.2 °C)  Pulse:  [] 112  Resp:  [14-29] 24  SpO2:  [91 %-100 %] 94 %  BP: (105-204)/(54-90) 119/59     Weight: 90.7 kg (200 lb)  Body mass index is 39.06 kg/m².    Intake/Output Summary (Last 24 hours) at 6/21/2024 1308  Last data filed at 6/21/2024 0631  Gross per 24 hour   Intake 50 ml   Output 1350 ml   Net -1300 ml         Physical Exam  Vitals and nursing note reviewed.   Constitutional:       General: She is not in acute distress.     Appearance: She is well-developed. She is obese. She is ill-appearing (chronically). She is not toxic-appearing or diaphoretic.   HENT:      Head: Normocephalic and atraumatic.      Mouth/Throat:      Mouth: Mucous membranes are dry.   Eyes:      Extraocular Movements: Extraocular movements intact.   Neck:      Trachea: No tracheal deviation.   Cardiovascular:      Rate and Rhythm: Regular rhythm. Tachycardia present.      Heart sounds: Normal heart sounds. No murmur heard.     No gallop.      Comments: Trace LLE edema.     Pulmonary:      Comments: Breathing comfortably on bipap, overall good air movement without wheezing, but with diffuse loud course rales  Abdominal:      General: Bowel sounds are normal. There is no distension.      Palpations: Abdomen is soft. There is no mass.      Tenderness: There is no abdominal tenderness. There is no guarding.   Musculoskeletal:         General: Swelling: left knee. Tenderness: left knee.      Cervical back: Normal range of motion.       Comments: Lets are mildly edematous.  Left knee without myles swelling, warmth or redness and is not tender to superficial palpation   Skin:     General: Skin is warm and dry.      Coloration: Skin is not pale.      Findings: No erythema or rash.      Comments: Left knee with significant warm with palpation. No erythema or fluctuance with palpation.    Neurological:      General: No focal deficit present.      Mental Status: She is alert and oriented to person, place, and time.      Cranial Nerves: No cranial nerve deficit.      Motor: No abnormal muscle tone.   Psychiatric:         Mood and Affect: Mood normal.             Significant Labs: All pertinent labs within the past 24 hours have been reviewed.    Significant Imaging: I have reviewed all pertinent imaging results/findings within the past 24 hours.

## 2024-06-21 NOTE — CONSULTS
Nutrition-Related Diabetes Education      Learners: patient   Current HbA1c: 9.5    Is patient aware of their A1c and their goal A1c? unsure    Home diabetes medication(s):  Diabetes Medications               insulin aspart U-100 (NOVOLOG FLEXPEN U-100 INSULIN) 100 unit/mL (3 mL) InPn pen Inject 14 Units into the skin 3 (three) times daily with meals. + meal correction scale. Max 40 units    insulin detemir U-100, Levemir, (LEVEMIR FLEXTOUCH U100 INSULIN) 100 unit/mL (3 mL) InPn pen Inject 20 Units into the skin once daily AND 18 Units every evening.    pioglitazone (ACTOS) 15 MG tablet Take 15 mg by mouth once daily.            Nutrition Education with handouts:   + Clinical Reference attachments to d/c documents    Comments:  Patient currently in ICU - with other provider (midline) at this time. NPO. Nutrition consult acknowledged for CHO controlled diet education. Per diabetes educator note, education not appropriate at this time. RD to monitor and follow up with education when medically appropriate. Documents attached to discharge paper work in case patient discharges over the weekend.       Barriers to Learning: Education not appropriate at this time    Follow up: Please provide an outpatient referral for diabetes educator     Please consult as needed.    Thank you!  Li Harkins, LULIN, LDN

## 2024-06-22 LAB
ANION GAP SERPL CALC-SCNC: 13 MMOL/L (ref 8–16)
ANION GAP SERPL CALC-SCNC: 13 MMOL/L (ref 8–16)
BASOPHILS # BLD AUTO: 0.04 K/UL (ref 0–0.2)
BASOPHILS NFR BLD: 0.2 % (ref 0–1.9)
BUN SERPL-MCNC: 46 MG/DL (ref 8–23)
BUN SERPL-MCNC: 46 MG/DL (ref 8–23)
CALCIUM SERPL-MCNC: 8.8 MG/DL (ref 8.7–10.5)
CALCIUM SERPL-MCNC: 9.1 MG/DL (ref 8.7–10.5)
CHLORIDE SERPL-SCNC: 102 MMOL/L (ref 95–110)
CHLORIDE SERPL-SCNC: 102 MMOL/L (ref 95–110)
CO2 SERPL-SCNC: 27 MMOL/L (ref 23–29)
CO2 SERPL-SCNC: 27 MMOL/L (ref 23–29)
CREAT SERPL-MCNC: 1.8 MG/DL (ref 0.5–1.4)
CREAT SERPL-MCNC: 1.8 MG/DL (ref 0.5–1.4)
DIFFERENTIAL METHOD BLD: ABNORMAL
EOSINOPHIL # BLD AUTO: 0 K/UL (ref 0–0.5)
EOSINOPHIL NFR BLD: 0 % (ref 0–8)
ERYTHROCYTE [DISTWIDTH] IN BLOOD BY AUTOMATED COUNT: 16.4 % (ref 11.5–14.5)
EST. GFR  (NO RACE VARIABLE): 28 ML/MIN/1.73 M^2
EST. GFR  (NO RACE VARIABLE): 28 ML/MIN/1.73 M^2
GLUCOSE SERPL-MCNC: 127 MG/DL (ref 70–110)
GLUCOSE SERPL-MCNC: 151 MG/DL (ref 70–110)
HCT VFR BLD AUTO: 32.8 % (ref 37–48.5)
HGB BLD-MCNC: 10.7 G/DL (ref 12–16)
IMM GRANULOCYTES # BLD AUTO: 0.07 K/UL (ref 0–0.04)
IMM GRANULOCYTES NFR BLD AUTO: 0.3 % (ref 0–0.5)
LACTATE SERPL-SCNC: 1.5 MMOL/L (ref 0.5–2.2)
LYMPHOCYTES # BLD AUTO: 2.2 K/UL (ref 1–4.8)
LYMPHOCYTES NFR BLD: 10.1 % (ref 18–48)
MCH RBC QN AUTO: 28.3 PG (ref 27–31)
MCHC RBC AUTO-ENTMCNC: 32.6 G/DL (ref 32–36)
MCV RBC AUTO: 87 FL (ref 82–98)
MONOCYTES # BLD AUTO: 1.5 K/UL (ref 0.3–1)
MONOCYTES NFR BLD: 7 % (ref 4–15)
NEUTROPHILS # BLD AUTO: 18.1 K/UL (ref 1.8–7.7)
NEUTROPHILS NFR BLD: 82.4 % (ref 38–73)
NRBC BLD-RTO: 0 /100 WBC
PHOSPHATE SERPL-MCNC: 2.7 MG/DL (ref 2.7–4.5)
PLATELET # BLD AUTO: 283 K/UL (ref 150–450)
PMV BLD AUTO: 11.6 FL (ref 9.2–12.9)
POCT GLUCOSE: 128 MG/DL (ref 70–110)
POCT GLUCOSE: 143 MG/DL (ref 70–110)
POCT GLUCOSE: 145 MG/DL (ref 70–110)
POCT GLUCOSE: 153 MG/DL (ref 70–110)
POCT GLUCOSE: 155 MG/DL (ref 70–110)
POCT GLUCOSE: 178 MG/DL (ref 70–110)
POCT GLUCOSE: 182 MG/DL (ref 70–110)
POCT GLUCOSE: 188 MG/DL (ref 70–110)
POCT GLUCOSE: 375 MG/DL (ref 70–110)
POCT GLUCOSE: 389 MG/DL (ref 70–110)
POCT GLUCOSE: 407 MG/DL (ref 70–110)
POTASSIUM SERPL-SCNC: 4.3 MMOL/L (ref 3.5–5.1)
POTASSIUM SERPL-SCNC: 4.3 MMOL/L (ref 3.5–5.1)
RBC # BLD AUTO: 3.78 M/UL (ref 4–5.4)
SODIUM SERPL-SCNC: 142 MMOL/L (ref 136–145)
SODIUM SERPL-SCNC: 142 MMOL/L (ref 136–145)
WBC # BLD AUTO: 22 K/UL (ref 3.9–12.7)

## 2024-06-22 PROCEDURE — 80048 BASIC METABOLIC PNL TOTAL CA: CPT | Performed by: HOSPITALIST

## 2024-06-22 PROCEDURE — 80048 BASIC METABOLIC PNL TOTAL CA: CPT | Mod: 91 | Performed by: HOSPITALIST

## 2024-06-22 PROCEDURE — 27000221 HC OXYGEN, UP TO 24 HOURS

## 2024-06-22 PROCEDURE — 63600175 PHARM REV CODE 636 W HCPCS: Performed by: STUDENT IN AN ORGANIZED HEALTH CARE EDUCATION/TRAINING PROGRAM

## 2024-06-22 PROCEDURE — 83605 ASSAY OF LACTIC ACID: CPT | Performed by: HOSPITALIST

## 2024-06-22 PROCEDURE — 94640 AIRWAY INHALATION TREATMENT: CPT

## 2024-06-22 PROCEDURE — 11000001 HC ACUTE MED/SURG PRIVATE ROOM

## 2024-06-22 PROCEDURE — 85025 COMPLETE CBC W/AUTO DIFF WBC: CPT | Performed by: HOSPITALIST

## 2024-06-22 PROCEDURE — 63600175 PHARM REV CODE 636 W HCPCS: Performed by: PHYSICIAN ASSISTANT

## 2024-06-22 PROCEDURE — 99233 SBSQ HOSP IP/OBS HIGH 50: CPT | Mod: ,,, | Performed by: INTERNAL MEDICINE

## 2024-06-22 PROCEDURE — 99900035 HC TECH TIME PER 15 MIN (STAT)

## 2024-06-22 PROCEDURE — 63600175 PHARM REV CODE 636 W HCPCS: Performed by: INTERNAL MEDICINE

## 2024-06-22 PROCEDURE — 63600175 PHARM REV CODE 636 W HCPCS: Performed by: HOSPITALIST

## 2024-06-22 PROCEDURE — 25000003 PHARM REV CODE 250: Performed by: HOSPITALIST

## 2024-06-22 PROCEDURE — 25000242 PHARM REV CODE 250 ALT 637 W/ HCPCS: Performed by: PHYSICIAN ASSISTANT

## 2024-06-22 PROCEDURE — 25000003 PHARM REV CODE 250: Performed by: PHYSICIAN ASSISTANT

## 2024-06-22 PROCEDURE — 94660 CPAP INITIATION&MGMT: CPT

## 2024-06-22 PROCEDURE — 94761 N-INVAS EAR/PLS OXIMETRY MLT: CPT

## 2024-06-22 PROCEDURE — 84100 ASSAY OF PHOSPHORUS: CPT | Performed by: HOSPITALIST

## 2024-06-22 PROCEDURE — A4216 STERILE WATER/SALINE, 10 ML: HCPCS | Performed by: PHYSICIAN ASSISTANT

## 2024-06-22 RX ORDER — LORAZEPAM 2 MG/ML
0.5 INJECTION INTRAMUSCULAR ONCE
Status: COMPLETED | OUTPATIENT
Start: 2024-06-22 | End: 2024-06-22

## 2024-06-22 RX ORDER — INSULIN ASPART 100 [IU]/ML
0-5 INJECTION, SOLUTION INTRAVENOUS; SUBCUTANEOUS
Status: DISCONTINUED | OUTPATIENT
Start: 2024-06-22 | End: 2024-06-23

## 2024-06-22 RX ORDER — INSULIN ASPART 100 [IU]/ML
14 INJECTION, SOLUTION INTRAVENOUS; SUBCUTANEOUS
Status: DISCONTINUED | OUTPATIENT
Start: 2024-06-22 | End: 2024-06-23

## 2024-06-22 RX ORDER — IBUPROFEN 200 MG
24 TABLET ORAL
Status: DISCONTINUED | OUTPATIENT
Start: 2024-06-22 | End: 2024-06-23

## 2024-06-22 RX ORDER — FUROSEMIDE 10 MG/ML
40 INJECTION INTRAMUSCULAR; INTRAVENOUS 2 TIMES DAILY
Status: DISCONTINUED | OUTPATIENT
Start: 2024-06-22 | End: 2024-06-23

## 2024-06-22 RX ORDER — INSULIN GLARGINE 100 [IU]/ML
20 INJECTION, SOLUTION SUBCUTANEOUS 2 TIMES DAILY
Status: DISCONTINUED | OUTPATIENT
Start: 2024-06-22 | End: 2024-06-23

## 2024-06-22 RX ORDER — INSULIN GLARGINE 100 [IU]/ML
18 INJECTION, SOLUTION SUBCUTANEOUS 2 TIMES DAILY
Status: DISCONTINUED | OUTPATIENT
Start: 2024-06-22 | End: 2024-06-22

## 2024-06-22 RX ORDER — IBUPROFEN 200 MG
16 TABLET ORAL
Status: DISCONTINUED | OUTPATIENT
Start: 2024-06-22 | End: 2024-06-23

## 2024-06-22 RX ORDER — GLUCAGON 1 MG
1 KIT INJECTION
Status: DISCONTINUED | OUTPATIENT
Start: 2024-06-22 | End: 2024-06-23

## 2024-06-22 RX ORDER — LIDOCAINE 50 MG/G
2 PATCH TOPICAL DAILY
Status: DISCONTINUED | OUTPATIENT
Start: 2024-06-22 | End: 2024-06-26 | Stop reason: HOSPADM

## 2024-06-22 RX ORDER — INSULIN ASPART 100 [IU]/ML
7 INJECTION, SOLUTION INTRAVENOUS; SUBCUTANEOUS
Status: DISCONTINUED | OUTPATIENT
Start: 2024-06-22 | End: 2024-06-22

## 2024-06-22 RX ADMIN — SERTRALINE HYDROCHLORIDE 100 MG: 50 TABLET ORAL at 08:06

## 2024-06-22 RX ADMIN — INSULIN GLARGINE 20 UNITS: 100 INJECTION, SOLUTION SUBCUTANEOUS at 08:06

## 2024-06-22 RX ADMIN — ALLOPURINOL 300 MG: 300 TABLET ORAL at 08:06

## 2024-06-22 RX ADMIN — METHOCARBAMOL 500 MG: 500 TABLET ORAL at 06:06

## 2024-06-22 RX ADMIN — CLONIDINE HYDROCHLORIDE 0.1 MG: 0.1 TABLET ORAL at 08:06

## 2024-06-22 RX ADMIN — LIDOCAINE 5% 2 PATCH: 700 PATCH TOPICAL at 08:06

## 2024-06-22 RX ADMIN — HEPARIN SODIUM 5000 UNITS: 5000 INJECTION INTRAVENOUS; SUBCUTANEOUS at 08:06

## 2024-06-22 RX ADMIN — IPRATROPIUM BROMIDE AND ALBUTEROL SULFATE 3 ML: 2.5; .5 SOLUTION RESPIRATORY (INHALATION) at 11:06

## 2024-06-22 RX ADMIN — METOPROLOL SUCCINATE 100 MG: 50 TABLET, EXTENDED RELEASE ORAL at 08:06

## 2024-06-22 RX ADMIN — NIFEDIPINE 30 MG: 30 TABLET, FILM COATED, EXTENDED RELEASE ORAL at 08:06

## 2024-06-22 RX ADMIN — INSULIN ASPART 3 UNITS: 100 INJECTION, SOLUTION INTRAVENOUS; SUBCUTANEOUS at 08:06

## 2024-06-22 RX ADMIN — HYDROCODONE BITARTRATE AND ACETAMINOPHEN 1 TABLET: 10; 325 TABLET ORAL at 02:06

## 2024-06-22 RX ADMIN — HEPARIN SODIUM 5000 UNITS: 5000 INJECTION INTRAVENOUS; SUBCUTANEOUS at 04:06

## 2024-06-22 RX ADMIN — FUROSEMIDE 40 MG: 10 INJECTION, SOLUTION INTRAMUSCULAR; INTRAVENOUS at 08:06

## 2024-06-22 RX ADMIN — HYDROCODONE BITARTRATE AND ACETAMINOPHEN 1 TABLET: 10; 325 TABLET ORAL at 09:06

## 2024-06-22 RX ADMIN — Medication 10 ML: at 03:06

## 2024-06-22 RX ADMIN — IPRATROPIUM BROMIDE AND ALBUTEROL SULFATE 3 ML: 2.5; .5 SOLUTION RESPIRATORY (INHALATION) at 03:06

## 2024-06-22 RX ADMIN — FUROSEMIDE 40 MG: 10 INJECTION, SOLUTION INTRAMUSCULAR; INTRAVENOUS at 05:06

## 2024-06-22 RX ADMIN — IPRATROPIUM BROMIDE AND ALBUTEROL SULFATE 3 ML: 2.5; .5 SOLUTION RESPIRATORY (INHALATION) at 04:06

## 2024-06-22 RX ADMIN — MUPIROCIN: 20 OINTMENT TOPICAL at 08:06

## 2024-06-22 RX ADMIN — INSULIN ASPART 5 UNITS: 100 INJECTION, SOLUTION INTRAVENOUS; SUBCUTANEOUS at 01:06

## 2024-06-22 RX ADMIN — IPRATROPIUM BROMIDE AND ALBUTEROL SULFATE 3 ML: 2.5; .5 SOLUTION RESPIRATORY (INHALATION) at 07:06

## 2024-06-22 RX ADMIN — Medication 10 ML: at 06:06

## 2024-06-22 RX ADMIN — MICONAZOLE NITRATE: 20 OINTMENT TOPICAL at 08:06

## 2024-06-22 RX ADMIN — INSULIN ASPART 5 UNITS: 100 INJECTION, SOLUTION INTRAVENOUS; SUBCUTANEOUS at 06:06

## 2024-06-22 RX ADMIN — ASPIRIN 81 MG: 81 TABLET, COATED ORAL at 08:06

## 2024-06-22 RX ADMIN — METOCLOPRAMIDE 5 MG: 5 TABLET ORAL at 05:06

## 2024-06-22 RX ADMIN — HEPARIN SODIUM 5000 UNITS: 5000 INJECTION INTRAVENOUS; SUBCUTANEOUS at 12:06

## 2024-06-22 RX ADMIN — Medication 10 ML: at 10:06

## 2024-06-22 RX ADMIN — FLUTICASONE PROPIONATE 50 MCG: 50 SPRAY, METERED NASAL at 08:06

## 2024-06-22 RX ADMIN — LORAZEPAM 0.5 MG: 2 INJECTION INTRAMUSCULAR; INTRAVENOUS at 12:06

## 2024-06-22 RX ADMIN — METOCLOPRAMIDE 5 MG: 5 TABLET ORAL at 08:06

## 2024-06-22 RX ADMIN — INSULIN ASPART 14 UNITS: 100 INJECTION, SOLUTION INTRAVENOUS; SUBCUTANEOUS at 06:06

## 2024-06-22 RX ADMIN — INSULIN GLARGINE 18 UNITS: 100 INJECTION, SOLUTION SUBCUTANEOUS at 07:06

## 2024-06-22 RX ADMIN — METOCLOPRAMIDE 5 MG: 5 TABLET ORAL at 12:06

## 2024-06-22 NOTE — SUBJECTIVE & OBJECTIVE
Past Medical History:   Diagnosis Date    Arthritis     Back pain     Cancer     ovarian    Cervical cancer     Coronary artery disease     Depression     Diabetes mellitus     Fibromyalgia     Heart attack     History of MI (myocardial infarction)     Hyperlipidemia     Hypertension     Lupus     Stroke     slight left sided weakness       Past Surgical History:   Procedure Laterality Date    APPENDECTOMY       SECTION      2    CORONARY ANGIOGRAPHY N/A 2022    Procedure: ANGIOGRAM, CORONARY ARTERY;  Surgeon: Jose L Méndez MD;  Location: [facility] CATH LAB;  Service: Cardiology;  Laterality: N/A;    HYSTERECTOMY      with USO for cervical cancer    INJECTION OF ANESTHETIC AGENT AROUND NERVE Bilateral 2021    Procedure: BLOCK, NERVE, SYMPATHIC;  Surgeon: Holden Pereira MD;  Location: [facility] PAIN MGT;  Service: Pain Management;  Laterality: Bilateral;    INJECTION OF ANESTHETIC AGENT AROUND NERVE N/A 2021    Procedure: BLOCK, NERVE, SYMPATHETIC  need consent;  Surgeon: Holden Pereira MD;  Location: [facility] PAIN MGT;  Service: Pain Management;  Laterality: N/A;    ME EVAL,SWALLOW FUNCTION,CINE/VIDEO RECORD  2021         TONSILLECTOMY      TRIAL OF SPINAL CORD NERVE STIMULATOR N/A 3/8/2023    Procedure: LUMBAR SPINAL CORD STIMULATOR TRIAL NEVRO REP PATIENT STATES SHE NO LONGER TAKES PLAVIX;  Surgeon: Holden Pereira MD;  Location: [facility] PAIN MGT;  Service: Pain Management;  Laterality: N/A;       Review of patient's allergies indicates:   Allergen Reactions    Bleach (sodium hypochlorite) Shortness Of Breath    Nitrofurantoin macrocrystalline Anaphylaxis    Lipitor [atorvastatin] Diarrhea and Rash    Nsaids (non-steroidal anti-inflammatory drug)      Tolerates aspirin      Pcn [penicillins]     Toradol [ketorolac]        Family History       Problem Relation (Age of Onset)    COPD Mother    Colon cancer Maternal Grandmother    Coronary artery disease Father    Diabetes Father    Hernia  Mother    Lupus Mother    Ovarian cancer Mother    Uterine cancer Mother          Tobacco Use    Smoking status: Former     Current packs/day: 0.00     Types: Cigarettes     Quit date: 11/2020     Years since quitting: 3.6     Passive exposure: Past    Smokeless tobacco: Never   Substance and Sexual Activity    Alcohol use: Not Currently     Comment: occasionally    Drug use: Yes     Types: Hydrocodone     Comment: three times a day    Sexual activity: Not Currently         Review of Systems   Unable to perform ROS: Acuity of condition     Objective:     Vital Signs (Most Recent):  Temp: 98.6 °F (37 °C) (06/21/24 1901)  Pulse: 70 (06/21/24 2318)  Resp: 19 (06/21/24 2318)  BP: (!) 109/53 (06/21/24 2055)  SpO2: (!) 94 % (06/21/24 2318) Vital Signs (24h Range):  Temp:  [98 °F (36.7 °C)-98.9 °F (37.2 °C)] 98.6 °F (37 °C)  Pulse:  [] 70  Resp:  [9-29] 19  SpO2:  [91 %-100 %] 94 %  BP: ()/(52-90) 109/53     Weight: 90.7 kg (200 lb)  Body mass index is 39.06 kg/m².      Intake/Output Summary (Last 24 hours) at 6/21/2024 2346  Last data filed at 6/21/2024 1904  Gross per 24 hour   Intake 350 ml   Output 2750 ml   Net -2400 ml        Physical Exam  Vitals and nursing note reviewed.   Constitutional:       General: She is not in acute distress.     Appearance: She is not toxic-appearing.   HENT:      Nose: Nose normal.      Mouth/Throat:      Mouth: Mucous membranes are moist.   Eyes:      General: No scleral icterus.     Extraocular Movements: Extraocular movements intact.      Pupils: Pupils are equal, round, and reactive to light.   Cardiovascular:      Rate and Rhythm: Normal rate and regular rhythm.      Pulses: Normal pulses.      Heart sounds: Normal heart sounds.   Pulmonary:      Effort: Pulmonary effort is normal.      Breath sounds: Rales (bilaterally) present.      Comments: On BiPAP 12/5 and 60% FiO2  Abdominal:      General: There is no distension.      Palpations: Abdomen is soft.      Tenderness:  There is no abdominal tenderness.   Musculoskeletal:      Right lower leg: Edema present.      Left lower leg: Edema present.   Skin:     General: Skin is warm and dry.   Neurological:      General: No focal deficit present.      Mental Status: She is alert.          Vents:  Oxygen Concentration (%): 40 (06/21/24 1944)    Lines/Drains/Airways       Drain  Duration             Female External Urinary Catheter w/ Suction 06/20/24 2340 1 day              Peripheral Intravenous Line  Duration                  Midline Catheter - Double Lumen 06/21/24 1334 Right cephalic vein (lateral side of arm) <1 day         Peripheral IV - Single Lumen 06/21/24 0204 20 G Right Antecubital <1 day                    Significant Labs:    CBC/Anemia Profile:  Recent Labs   Lab 06/20/24  1103 06/20/24  1608 06/21/24  0156 06/21/24  0914 06/21/24  1527   WBC 12.42 11.59  --  15.68*  15.68*  --    HGB 10.3* 10.4*  --  11.3*  11.3*  --    HCT 33.0* 32.0* 47 36.2*  36.2* 34*    276  --  345  345  --    MCV 88 86  --  88  88  --    RDW 15.9* 15.9*  --  15.9*  15.9*  --         Chemistries:  Recent Labs   Lab 06/20/24  1103 06/20/24  1608 06/21/24  0409 06/21/24  0913 06/21/24  0914 06/21/24  1321 06/21/24  1951 06/21/24  2101   * 133* 129*   < >  --  138 138 139   K 4.5 5.0 5.3*   < > 5.2* 3.9 5.0 4.7   CL 96 95 95   < >  --  103 103 102   CO2 21* 22* 11*   < >  --  20* 20* 25   BUN 35* 33* 35*   < >  --  39* 44* 46*   CREATININE 1.9* 1.9* 2.1*   < >  --  1.8* 1.9* 1.9*   CALCIUM 9.5 9.2 8.7   < >  --  8.4* 9.0 9.1   ALBUMIN 3.5 3.5  --   --   --   --   --   --    PROT 6.9 7.1  --   --   --   --   --   --    BILITOT 0.2 0.1  --   --   --   --   --   --    ALKPHOS 54* 53*  --   --   --   --   --   --    ALT 14 15  --   --   --   --   --   --    AST 14 22  --   --   --   --   --   --    MG  --   --  2.5  --  2.4  --   --   --     < > = values in this interval not displayed.     ANGIE  Recent Labs   Lab 06/21/24 2000   PH 7.446    PO2 66*   PCO2 42.1   HCO3 29.0*   BE 5*         All pertinent labs within the past 24 hours have been reviewed.    Significant Imaging:   I have reviewed all pertinent imaging results/findings within the past 24 hours.    Echo    Left Ventricle: The left ventricle is normal in size. Normal wall   thickness. Global hypokinesis present. There is mildly reduced systolic   function with a visually estimated ejection fraction of 40 - 50%. There is   normal diastolic function.    Right Ventricle: Normal right ventricular cavity size. Wall thickness   is normal. Systolic function is normal.    Pulmonary Artery: There is mild pulmonary hypertension. The estimated   pulmonary artery systolic pressure is 45 mmHg.  X-Ray Knee 1 or 2 View Left  Narrative: EXAMINATION:  XR KNEE 1 OR 2 VIEW LEFT    CLINICAL HISTORY:  left knee;    TECHNIQUE:  One or two views of the left knee were performed.    COMPARISON:  03/14/2024.    FINDINGS:  There is osteopenia.  There is no acute fracture or dislocation. Alignment is normal. Joint spaces are preserved. No joint effusion.  Impression: No acute osseous abnormality of the knee.    Electronically signed by: Ramin Krishnamurthy  Date:    06/21/2024  Time:    08:40  X-Ray Chest AP Portable  Narrative: EXAMINATION:  XR CHEST AP PORTABLE    CLINICAL HISTORY:  Acute Respiratory Failure;    TECHNIQUE:  Single frontal view of the chest was performed.    COMPARISON:  06/20/2024    FINDINGS:  Cardiac monitoring leads overlie the chest.  The cardiomediastinal silhouette is unchanged in size and configuration.  The lungs are symmetrically expanded with interval detrimental change in lung aeration with new bilateral mixed interstitial and more focal airspace opacities with a bibasilar predominance.  Findings can be seen with edema, infectious or non-infectious inflammatory process.  No evidence of pneumothorax.    This report was flagged in Epic as abnormal.  Impression: Please see above.    Electronically  signed by: Shantel Gonzalez MD  Date:    06/21/2024  Time:    05:09

## 2024-06-22 NOTE — ASSESSMENT & PLAN NOTE
"Patient presenting with complaints of shortness of breath and leg swelling x 1 week. Desaturated in PCP office and sent to the ED. In the ED patient with clear CXR and saturating well on room air. Received nebulizer therapy and steroids for possible COPD exacerbation. Patient with diagnosis of "chronic airflow limitation" but unclear where this diagnosis came from and no PFTs in the system. Patient is on inhalers at home. After episode of significant hypertension presumably 2/2 pain patient had decompensation and began having acute hypoxemia. Blood gas at that time with respiratory acidosis however did also appear to have concomitant metabolic acidosis. Was placed on BiPAP and repeat CXR with new bilateral interstitial opacities consistent with pulmonary edema.    Overall, this picture is very consistent with flash pulmonary edema 2/2 hypertensive event. Bedside POCUS with bilateral B-line predominance up to the apices of the lung. Good cardiac function with no WMA and no pericardial effusion.    - Continue home meds, good BP control at this time  - Giving lasix as overloaded clinically on exam, excellent urine output with 80mg x1, will give additional dose tonight  - BiPAP QHS and PRN given volume overload.  - Low suspicion for PE, stopped heparin gtt  -Will need pulmonary follow up and PFTs  - OK for nebs, would avoid steroids  "

## 2024-06-22 NOTE — SUBJECTIVE & OBJECTIVE
Interval History: No acute events overnight.  Did have some agitation requiring ativan x1.  Today she looks much improved and is breathing comfortably on nasal canula O2.  She reports she diagnosed with RA and SLE many years ago in [place] - but is unsure if she was every on specific medications.  We discussed her 2022 rheumatology visit and negative lab workup for these at that time.  I don't think she has SLE or RA.  All questions answered and patient had no further complaints.    Objective:     Vital Signs (Most Recent):  Temp: 99.1 °F (37.3 °C) (06/22/24 0701)  Pulse: 96 (06/22/24 0901)  Resp: (!) 21 (06/22/24 0901)  BP: (!) 143/65 (06/22/24 0901)  SpO2: 95 % (06/22/24 0901) Vital Signs (24h Range):  Temp:  [98.5 °F (36.9 °C)-99.3 °F (37.4 °C)] 99.1 °F (37.3 °C)  Pulse:  [70-97] 96  Resp:  [7-26] 21  SpO2:  [89 %-100 %] 95 %  BP: ()/(48-69) 143/65     Weight: 90.7 kg (200 lb)  Body mass index is 39.06 kg/m².    Intake/Output Summary (Last 24 hours) at 6/22/2024 1155  Last data filed at 6/22/2024 0659  Gross per 24 hour   Intake 445.89 ml   Output 2200 ml   Net -1754.11 ml         Physical Exam  Vitals and nursing note reviewed.   Constitutional:       General: She is not in acute distress.     Appearance: She is well-developed. She is obese. She is ill-appearing (chronically). She is not toxic-appearing or diaphoretic.   HENT:      Head: Normocephalic and atraumatic.      Mouth/Throat:      Mouth: Mucous membranes are moist.   Eyes:      Extraocular Movements: Extraocular movements intact.   Neck:      Trachea: No tracheal deviation.   Cardiovascular:      Rate and Rhythm: Regular rhythm. Tachycardia present.      Heart sounds: Normal heart sounds. No murmur heard.     No gallop.      Comments: Trace LLE edema.     Pulmonary:      Comments: Breathing comfortably on bipap, overall good air movement without wheezing, but with diffuse loud course rales  Abdominal:      General: Bowel sounds are normal. There  is no distension.      Palpations: Abdomen is soft. There is no mass.      Tenderness: There is no abdominal tenderness. There is no guarding.   Musculoskeletal:         General: Swelling: left knee. Tenderness: left knee.      Cervical back: Normal range of motion.      Comments: Lets are mildly edematous.  Left knee without myles swelling, warmth or redness and is not tender to superficial palpation   Skin:     General: Skin is warm and dry.      Coloration: Skin is not pale.      Findings: No erythema or rash.      Comments: Left knee with significant warm with palpation. No erythema or fluctuance with palpation.    Neurological:      General: No focal deficit present.      Mental Status: She is alert and oriented to person, place, and time.      Cranial Nerves: No cranial nerve deficit.      Motor: No abnormal muscle tone.   Psychiatric:         Mood and Affect: Mood normal.             Significant Labs: All pertinent labs within the past 24 hours have been reviewed.    Significant Imaging: I have reviewed all pertinent imaging results/findings within the past 24 hours.

## 2024-06-22 NOTE — PROGRESS NOTES
"[facility]    Admission date:  6/20/2024     Assessment    Acute on chronic combined systolic and diastolic heart failure   Improving.  Needs further diuresis.     Coronary atherosclerosis   No angina    Plan and Discussion    Continue efforts at diuresis.    Subjective    Denies dyspnea.  States she is "sore from fighting."    Medications  Current Facility-Administered Medications   Medication Dose Route Frequency Provider Last Rate Last Admin    acetaminophen tablet 650 mg  650 mg Oral Q6H PRN Marifer Boudreaux PA-C   650 mg at 06/21/24 0043    albuterol-ipratropium 2.5 mg-0.5 mg/3 mL nebulizer solution 3 mL  3 mL Nebulization Q4H PRN Marifer Boudreaux PA-C        albuterol-ipratropium 2.5 mg-0.5 mg/3 mL nebulizer solution 3 mL  3 mL Nebulization Q4H Marifer Boudreaux PA-C   3 mL at 06/22/24 0731    allopurinoL tablet 300 mg  300 mg Oral Daily Marifer Boudreaux PA-C   300 mg at 06/21/24 0955    aspirin EC tablet 81 mg  81 mg Oral Daily Marifer Boudreaux PA-C   81 mg at 06/21/24 0954    cloNIDine tablet 0.1 mg  0.1 mg Oral BID Marifer Boudreaux PA-ELISHA   0.1 mg at 06/21/24 0955    dextrose 10% bolus 125 mL 125 mL  12.5 g Intravenous PRN Rey Lieberman MD        dextrose 10% bolus 250 mL 250 mL  25 g Intravenous PRN Rey Lieberman MD        dextrose 5 % and 0.45 % NaCl infusion  125 mL/hr Intravenous Continuous PRN Rey Lieberman MD        fluticasone propionate 50 mcg/actuation nasal spray 50 mcg  1 spray Each Nostril Daily Marifer Boudreaux PA-C   50 mcg at 06/21/24 0954    heparin (porcine) injection 5,000 Units  5,000 Units Subcutaneous Q8H Rey Lieberman MD   5,000 Units at 06/22/24 0408    hydrALAZINE injection 15 mg  15 mg Intravenous Q4H PRN Rey Lieberman MD        HYDROcodone-acetaminophen  mg per tablet 1 tablet  1 tablet Oral Q8H PRN Marifer Boudreaux PA-C   1 tablet at 06/21/24 1806    insulin glargine U-100 (Lantus) pen 18 Units  18 Units " Subcutaneous BID Karissa Qureshi MD   18 Units at 06/22/24 0745    LIDOcaine 5 % patch 1 patch  1 patch Transdermal Daily Rey Lieberman MD   1 patch at 06/21/24 1115    melatonin tablet 6 mg  6 mg Oral Nightly PRN Jazzmine Martinez MD        methocarbamoL tablet 500 mg  500 mg Oral TID PRN Marifer Boudreaux PA-C        metoclopramide HCl tablet 5 mg  5 mg Oral QID (AC & HS) Marifer Boudreaux PA-C   5 mg at 06/21/24 1621    metoprolol succinate (TOPROL-XL) 24 hr tablet 100 mg  100 mg Oral Daily Marifer Boudreaux PA-C   100 mg at 06/21/24 0955    miconazole nitrate 2% ointment   Topical (Top) BID Rey Lieberman MD   Given at 06/21/24 2051    mupirocin 2 % ointment   Nasal BID Rey Lieberman MD   Given at 06/21/24 2052    naloxone 0.4 mg/mL injection 0.02 mg  0.02 mg Intravenous PRN Marifer Boudreaux PA-C        NIFEdipine 24 hr tablet 30 mg  30 mg Oral BID Marifer Boudreaux PA-C   30 mg at 06/21/24 0955    senna-docusate 8.6-50 mg per tablet 1 tablet  1 tablet Oral BID PRN Marifer Boudreaux PA-C        sertraline tablet 100 mg  100 mg Oral Daily Marifer Boudreaux PA-C   100 mg at 06/21/24 0955    sodium chloride 0.9% flush 10 mL  10 mL Intravenous PRN Jazzmine Martinez MD        sodium chloride 0.9% flush 10 mL  10 mL Intravenous Q8H Marifer Boudreaux PA-C   10 mL at 06/22/24 0602    sodium chloride 0.9% flush 10 mL  10 mL Intravenous PRN Rey Lieberman MD        sodium chloride 0.9% flush 3 mL  3 mL Intravenous PRN Marifer Boudreaux PA-C            Physical Exam    Temp:  [98.5 °F (36.9 °C)-99.3 °F (37.4 °C)]   Pulse:  []   Resp:  [7-26]   BP: ()/(48-66)   SpO2:  [91 %-100 %]    Wt Readings from Last 3 Encounters:   06/21/24 90.7 kg (200 lb)   05/22/24 89.8 kg (198 lb)   05/15/24 89.9 kg (198 lb 3.2 oz)     Physical Exam  Constitutional:       General: She is not in acute distress.     Interventions: Nasal cannula in place.   Neck:      Vascular: No  hepatojugular reflux or JVD.   Cardiovascular:      Rate and Rhythm: Normal rate and regular rhythm.      Heart sounds: S1 normal and S2 normal. Murmur heard.      Systolic murmur is present.      Gallop present. S4 sounds present. No S3 sounds.   Pulmonary:      Effort: Pulmonary effort is normal.      Breath sounds: Normal air entry. Wheezing and rales present.   Abdominal:      General: Bowel sounds are normal.      Palpations: Abdomen is soft. There is no hepatomegaly.      Tenderness: There is no abdominal tenderness.   Musculoskeletal:      Right lower leg: Edema present.      Left lower leg: Edema present.   Skin:     Coloration: Skin is not pale.   Neurological:      Mental Status: She is alert.   Psychiatric:         Behavior: Behavior is cooperative.         Telemetry  Sinus rhythm    Labs  Recent Results (from the past 24 hour(s))   POCT glucose    Collection Time: 06/21/24  8:33 AM   Result Value Ref Range    POCT Glucose >500 (HH) 70 - 110 mg/dL   Beta-Hydroxybutyrate, Serum    Collection Time: 06/21/24  8:35 AM   Result Value Ref Range    Beta-Hydroxybutyrate 3.8 (H) 0.0 - 0.5 mmol/L   Basic Metabolic Panel (BMP)    Collection Time: 06/21/24  9:13 AM   Result Value Ref Range    Sodium 132 (L) 136 - 145 mmol/L    Potassium 5.2 (H) 3.5 - 5.1 mmol/L    Chloride 95 95 - 110 mmol/L    CO2 15 (L) 23 - 29 mmol/L    Glucose 901 (HH) 70 - 110 mg/dL    BUN 41 (H) 8 - 23 mg/dL    Creatinine 2.2 (H) 0.5 - 1.4 mg/dL    Calcium 9.0 8.7 - 10.5 mg/dL    Anion Gap 22 (H) 8 - 16 mmol/L    eGFR 22 (A) >60 mL/min/1.73 m^2   Lactic Acid, Plasma    Collection Time: 06/21/24  9:13 AM   Result Value Ref Range    Lactate (Lactic Acid) 6.0 (HH) 0.5 - 2.2 mmol/L   APTT    Collection Time: 06/21/24  9:14 AM   Result Value Ref Range    aPTT 24.4 21.0 - 32.0 sec   Protime-INR    Collection Time: 06/21/24  9:14 AM   Result Value Ref Range    Prothrombin Time 10.9 9.0 - 12.5 sec    INR 1.0 0.8 - 1.2   CBC auto differential     Collection Time: 06/21/24  9:14 AM   Result Value Ref Range    WBC 15.68 (H) 3.90 - 12.70 K/uL    RBC 4.10 4.00 - 5.40 M/uL    Hemoglobin 11.3 (L) 12.0 - 16.0 g/dL    Hematocrit 36.2 (L) 37.0 - 48.5 %    MCV 88 82 - 98 fL    MCH 27.6 27.0 - 31.0 pg    MCHC 31.2 (L) 32.0 - 36.0 g/dL    RDW 15.9 (H) 11.5 - 14.5 %    Platelets 345 150 - 450 K/uL    MPV 11.7 9.2 - 12.9 fL    Immature Granulocytes 1.0 (H) 0.0 - 0.5 %    Gran # (ANC) 14.3 (H) 1.8 - 7.7 K/uL    Immature Grans (Abs) 0.15 (H) 0.00 - 0.04 K/uL    Lymph # 0.8 (L) 1.0 - 4.8 K/uL    Mono # 0.4 0.3 - 1.0 K/uL    Eos # 0.0 0.0 - 0.5 K/uL    Baso # 0.04 0.00 - 0.20 K/uL    nRBC 0 0 /100 WBC    Gran % 91.2 (H) 38.0 - 73.0 %    Lymph % 4.8 (L) 18.0 - 48.0 %    Mono % 2.7 (L) 4.0 - 15.0 %    Eosinophil % 0.0 0.0 - 8.0 %    Basophil % 0.3 0.0 - 1.9 %    Differential Method Automated    CBC with Automated Differential    Collection Time: 06/21/24  9:14 AM   Result Value Ref Range    WBC 15.68 (H) 3.90 - 12.70 K/uL    RBC 4.10 4.00 - 5.40 M/uL    Hemoglobin 11.3 (L) 12.0 - 16.0 g/dL    Hematocrit 36.2 (L) 37.0 - 48.5 %    MCV 88 82 - 98 fL    MCH 27.6 27.0 - 31.0 pg    MCHC 31.2 (L) 32.0 - 36.0 g/dL    RDW 15.9 (H) 11.5 - 14.5 %    Platelets 345 150 - 450 K/uL    MPV 11.7 9.2 - 12.9 fL    Immature Granulocytes 1.0 (H) 0.0 - 0.5 %    Gran # (ANC) 14.3 (H) 1.8 - 7.7 K/uL    Immature Grans (Abs) 0.15 (H) 0.00 - 0.04 K/uL    Lymph # 0.8 (L) 1.0 - 4.8 K/uL    Mono # 0.4 0.3 - 1.0 K/uL    Eos # 0.0 0.0 - 0.5 K/uL    Baso # 0.04 0.00 - 0.20 K/uL    nRBC 0 0 /100 WBC    Gran % 91.2 (H) 38.0 - 73.0 %    Lymph % 4.8 (L) 18.0 - 48.0 %    Mono % 2.7 (L) 4.0 - 15.0 %    Eosinophil % 0.0 0.0 - 8.0 %    Basophil % 0.3 0.0 - 1.9 %    Differential Method Automated    Magnesium    Collection Time: 06/21/24  9:14 AM   Result Value Ref Range    Magnesium 2.4 1.6 - 2.6 mg/dL   Potassium - if not ordered in last 24 hours    Collection Time: 06/21/24  9:14 AM   Result Value Ref Range     Potassium 5.2 (H) 3.5 - 5.1 mmol/L   Procalcitonin    Collection Time: 06/21/24  9:14 AM   Result Value Ref Range    Procalcitonin 1.66 (H) <0.25 ng/mL   POCT glucose    Collection Time: 06/21/24  9:27 AM   Result Value Ref Range    POCT Glucose >500 (HH) 70 - 110 mg/dL   Echo    Collection Time: 06/21/24 10:06 AM   Result Value Ref Range    BSA 1.96 m2    LVOT stroke volume 72.29 cm3    LVIDd 4.29 3.5 - 6.0 cm    LV Systolic Volume 60.16 mL    LV Systolic Volume Index 32.2 mL/m2    LVIDs 3.75 2.1 - 4.0 cm    LV Diastolic Volume 82.67 mL    LV Diastolic Volume Index 44.21 mL/m2    Left Ventricular End Systolic Volume by Teichholz Method 60.16 mL    Left Ventricular End Diastolic Volume by Teichholz Method 82.67 mL    IVS 0.82 0.6 - 1.1 cm    LVOT diameter 1.83 cm    LVOT area 2.6 cm2    FS 13 (A) 28 - 44 %    Left Ventricle Relative Wall Thickness 0.35 cm    Posterior Wall 0.75 0.6 - 1.1 cm    LV mass 102.33 g    LV Mass Index 55 g/m2    MV Peak E Calvin 1.56 m/s    TDI LATERAL 0.08 m/s    TDI SEPTAL 0.04 m/s    E/E' ratio 26.00 m/s    TR Max Calvin 3.25 m/s    IVRT 53.28 msec    E wave deceleration time 92.83 msec    LV SEPTAL E/E' RATIO 39.00 m/s    LV LATERAL E/E' RATIO 19.50 m/s    LVOT peak calvin 1.38 m/s    Left Ventricular Outflow Tract Mean Velocity 1.02 cm/s    Left Ventricular Outflow Tract Mean Gradient 4.38 mmHg    LA size 2.97 cm    Left Atrium Minor Axis 4.97 cm    Left Atrium Major Axis 4.82 cm    RA Major Axis 4.90 cm    AV mean gradient 6 mmHg    AV peak gradient 9 mmHg    Ao peak calvin 1.52 m/s    Ao VTI 28.00 cm    LVOT peak VTI 27.50 cm    AV valve area 2.58 cm²    AV Velocity Ratio 0.91     AV index (prosthetic) 0.98     PAT by Velocity Ratio 2.39 cm²    MV stenosis pressure 1/2 time 26.92 ms    MV valve area p 1/2 method 8.17 cm2    Triscuspid Valve Regurgitation Peak Gradient 42 mmHg    Mean e' 0.06 m/s    ZLVIDS 1.22     ZLVIDD -1.95     LA Volume Index 21.8 mL/m2    LA volume 40.77 cm3    LA WIDTH 3.3  cm    RA Width 4.9 cm    TV resting pulmonary artery pressure 45 mmHg    RV TB RVSP 6 mmHg    Est. RA pres 3 mmHg   POCT glucose    Collection Time: 06/21/24 10:23 AM   Result Value Ref Range    POCT Glucose >500 (HH) 70 - 110 mg/dL   POCT glucose    Collection Time: 06/21/24 11:41 AM   Result Value Ref Range    POCT Glucose >500 (HH) 70 - 110 mg/dL   POCT glucose    Collection Time: 06/21/24 12:47 PM   Result Value Ref Range    POCT Glucose >500 (HH) 70 - 110 mg/dL   Basic metabolic panel    Collection Time: 06/21/24  1:21 PM   Result Value Ref Range    Sodium 138 136 - 145 mmol/L    Potassium 3.9 3.5 - 5.1 mmol/L    Chloride 103 95 - 110 mmol/L    CO2 20 (L) 23 - 29 mmol/L    Glucose 527 (HH) 70 - 110 mg/dL    BUN 39 (H) 8 - 23 mg/dL    Creatinine 1.8 (H) 0.5 - 1.4 mg/dL    Calcium 8.4 (L) 8.7 - 10.5 mg/dL    Anion Gap 15 8 - 16 mmol/L    eGFR 28 (A) >60 mL/min/1.73 m^2   POCT glucose    Collection Time: 06/21/24  2:07 PM   Result Value Ref Range    POCT Glucose 422 (H) 70 - 110 mg/dL   ISTAT PROCEDURE    Collection Time: 06/21/24  3:27 PM   Result Value Ref Range    POC PH 7.439 7.35 - 7.45    POC PCO2 38.7 35 - 45 mmHg    POC PO2 70 (L) 80 - 100 mmHg    POC HCO3 26.3 24 - 28 mmol/L    POC BE 2 -2 to 2 mmol/L    POC SATURATED O2 94 95 - 100 %    POC TCO2 27 23 - 27 mmol/L    POC Hematocrit 34 (L) 36 - 54 %PCV    POC HEMOGLOBIN 12 g/dL    Sample ARTERIAL     Site RR     Allens Test Pass     DelSys Nasal Can    POCT glucose    Collection Time: 06/21/24  3:30 PM   Result Value Ref Range    POCT Glucose 271 (H) 70 - 110 mg/dL   Respiratory Infection Panel (PCR), Nasopharyngeal    Collection Time: 06/21/24  3:36 PM    Specimen: Nasopharyngeal Swab   Result Value Ref Range    Respiratory Infection Panel Source NP Swab     Adenovirus Not Detected Not Detected    Coronavirus 229E, Common Cold Virus Not Detected Not Detected    Coronavirus HKU1, Common Cold Virus Not Detected Not Detected    Coronavirus NL63, Common Cold  Virus Not Detected Not Detected    Coronavirus OC43, Common Cold Virus Not Detected Not Detected    SARS-CoV2 (COVID-19) Qualitative PCR Not Detected Not Detected    Human Metapneumovirus Not Detected Not Detected    Human Rhinovirus/Enterovirus Not Detected Not Detected    Influenza A (subtypes H1, H1-2009,H3) Not Detected Not Detected    Influenza B Not Detected Not Detected    Parainfluenza Virus 1 Not Detected Not Detected    Parainfluenza Virus 2 Not Detected Not Detected    Parainfluenza Virus 3 Not Detected Not Detected    Parainfluenza Virus 4 Not Detected Not Detected    Respiratory Syncytial Virus Not Detected Not Detected    Bordetella Parapertussis (PT9022) Not Detected Not Detected    Bordetella pertussis (ptxP) Not Detected Not Detected    Chlamydia pneumoniae Not Detected Not Detected    Mycoplasma pneumoniae Not Detected Not Detected   Creatinine, Random Urine    Collection Time: 06/21/24  3:38 PM   Result Value Ref Range    Creatinine, Urine 15.1 15.0 - 325.0 mg/dL   Sodium, Random Urine    Collection Time: 06/21/24  3:38 PM   Result Value Ref Range    Sodium, Urine 79 20 - 250 mmol/L   Urea Nitrogen, Random Urine    Collection Time: 06/21/24  3:38 PM   Result Value Ref Range    Urine Urea Nitrogen 155 140 - 1050 mg/dL   Protein / creatinine ratio, urine    Collection Time: 06/21/24  3:38 PM   Result Value Ref Range    Protein, Urine Random <7 0 - 15 mg/dL    Creatinine, Urine 15.1 15.0 - 325.0 mg/dL    Prot/Creat Ratio, Urine Unable to calculate 0.00 - 0.20   Osmolality, Urine    Collection Time: 06/21/24  3:39 PM   Result Value Ref Range    Osmolality, Urine 387 50 - 1200 mOsm/kg   Fallon's Stain, Urine Random    Collection Time: 06/21/24  3:39 PM   Result Value Ref Range    Fallon's Stain, Ur No eosinophils seen No eosinophils seen   Urinalysis, Reflex to Urine Culture Urine, Clean Catch    Collection Time: 06/21/24  3:39 PM    Specimen: Urine   Result Value Ref Range    Specimen UA Urine, Clean  Catch     Color, UA Colorless (A) Yellow, Straw, Aye    Appearance, UA Clear Clear    pH, UA 5.0 5.0 - 8.0    Specific Gravity, UA 1.010 1.005 - 1.030    Protein, UA Negative Negative    Glucose, UA 4+ (A) Negative    Ketones, UA Negative Negative    Bilirubin (UA) Negative Negative    Occult Blood UA Negative Negative    Nitrite, UA Negative Negative    Urobilinogen, UA Negative <2.0 EU/dL    Leukocytes, UA Trace (A) Negative   Urinalysis Microscopic    Collection Time: 06/21/24  3:39 PM   Result Value Ref Range    RBC, UA 1 0 - 4 /hpf    WBC, UA 1 0 - 5 /hpf    Bacteria Occasional None-Occ /hpf    Yeast, UA None None    Microscopic Comment SEE COMMENT    Uric Acid    Collection Time: 06/21/24  3:57 PM   Result Value Ref Range    Uric Acid 7.4 (H) 2.4 - 5.7 mg/dL   Lactic acid, plasma    Collection Time: 06/21/24  3:57 PM   Result Value Ref Range    Lactate (Lactic Acid) 2.7 (H) 0.5 - 2.2 mmol/L   POCT glucose    Collection Time: 06/21/24  4:47 PM   Result Value Ref Range    POCT Glucose 202 (H) 70 - 110 mg/dL   POCT glucose    Collection Time: 06/21/24  5:59 PM   Result Value Ref Range    POCT Glucose 116 (H) 70 - 110 mg/dL   POCT glucose    Collection Time: 06/21/24  7:37 PM   Result Value Ref Range    POCT Glucose 161 (H) 70 - 110 mg/dL   Basic metabolic panel    Collection Time: 06/21/24  7:51 PM   Result Value Ref Range    Sodium 138 136 - 145 mmol/L    Potassium 5.0 3.5 - 5.1 mmol/L    Chloride 103 95 - 110 mmol/L    CO2 20 (L) 23 - 29 mmol/L    Glucose 169 (H) 70 - 110 mg/dL    BUN 44 (H) 8 - 23 mg/dL    Creatinine 1.9 (H) 0.5 - 1.4 mg/dL    Calcium 9.0 8.7 - 10.5 mg/dL    Anion Gap 15 8 - 16 mmol/L    eGFR 27 (A) >60 mL/min/1.73 m^2   ISTAT PROCEDURE    Collection Time: 06/21/24  8:00 PM   Result Value Ref Range    POC PH 7.446 7.35 - 7.45    POC PCO2 42.1 35 - 45 mmHg    POC PO2 66 (L) 80 - 100 mmHg    POC HCO3 29.0 (H) 24 - 28 mmol/L    POC BE 5 (H) -2 to 2 mmol/L    POC SATURATED O2 93 95 - 100 %     POC TCO2 30 (H) 23 - 27 mmol/L    Sample ARTERIAL     Site RR     Allens Test Pass    Basic metabolic panel    Collection Time: 06/21/24  9:01 PM   Result Value Ref Range    Sodium 139 136 - 145 mmol/L    Potassium 4.7 3.5 - 5.1 mmol/L    Chloride 102 95 - 110 mmol/L    CO2 25 23 - 29 mmol/L    Glucose 195 (H) 70 - 110 mg/dL    BUN 46 (H) 8 - 23 mg/dL    Creatinine 1.9 (H) 0.5 - 1.4 mg/dL    Calcium 9.1 8.7 - 10.5 mg/dL    Anion Gap 12 8 - 16 mmol/L    eGFR 27 (A) >60 mL/min/1.73 m^2   Lactic acid, plasma    Collection Time: 06/21/24  9:01 PM   Result Value Ref Range    Lactate (Lactic Acid) 1.0 0.5 - 2.2 mmol/L   POCT glucose    Collection Time: 06/21/24  9:08 PM   Result Value Ref Range    POCT Glucose 195 (H) 70 - 110 mg/dL   POCT glucose    Collection Time: 06/21/24 10:12 PM   Result Value Ref Range    POCT Glucose 176 (H) 70 - 110 mg/dL   POCT glucose    Collection Time: 06/21/24 11:35 PM   Result Value Ref Range    POCT Glucose 144 (H) 70 - 110 mg/dL   POCT glucose    Collection Time: 06/22/24 12:24 AM   Result Value Ref Range    POCT Glucose 153 (H) 70 - 110 mg/dL   POCT glucose    Collection Time: 06/22/24  1:35 AM   Result Value Ref Range    POCT Glucose 155 (H) 70 - 110 mg/dL   POCT glucose    Collection Time: 06/22/24  2:34 AM   Result Value Ref Range    POCT Glucose 143 (H) 70 - 110 mg/dL   Basic metabolic panel    Collection Time: 06/22/24  3:17 AM   Result Value Ref Range    Sodium 142 136 - 145 mmol/L    Potassium 4.3 3.5 - 5.1 mmol/L    Chloride 102 95 - 110 mmol/L    CO2 27 23 - 29 mmol/L    Glucose 127 (H) 70 - 110 mg/dL    BUN 46 (H) 8 - 23 mg/dL    Creatinine 1.8 (H) 0.5 - 1.4 mg/dL    Calcium 8.8 8.7 - 10.5 mg/dL    Anion Gap 13 8 - 16 mmol/L    eGFR 28 (A) >60 mL/min/1.73 m^2   Phosphorus    Collection Time: 06/22/24  3:17 AM   Result Value Ref Range    Phosphorus 2.7 2.7 - 4.5 mg/dL   POCT glucose    Collection Time: 06/22/24  3:55 AM   Result Value Ref Range    POCT Glucose 145 (H) 70 - 110  mg/dL   Basic metabolic panel    Collection Time: 06/22/24  4:02 AM   Result Value Ref Range    Sodium 142 136 - 145 mmol/L    Potassium 4.3 3.5 - 5.1 mmol/L    Chloride 102 95 - 110 mmol/L    CO2 27 23 - 29 mmol/L    Glucose 151 (H) 70 - 110 mg/dL    BUN 46 (H) 8 - 23 mg/dL    Creatinine 1.8 (H) 0.5 - 1.4 mg/dL    Calcium 9.1 8.7 - 10.5 mg/dL    Anion Gap 13 8 - 16 mmol/L    eGFR 28 (A) >60 mL/min/1.73 m^2   CBC with Automated Differential    Collection Time: 06/22/24  4:02 AM   Result Value Ref Range    WBC 22.00 (H) 3.90 - 12.70 K/uL    RBC 3.78 (L) 4.00 - 5.40 M/uL    Hemoglobin 10.7 (L) 12.0 - 16.0 g/dL    Hematocrit 32.8 (L) 37.0 - 48.5 %    MCV 87 82 - 98 fL    MCH 28.3 27.0 - 31.0 pg    MCHC 32.6 32.0 - 36.0 g/dL    RDW 16.4 (H) 11.5 - 14.5 %    Platelets 283 150 - 450 K/uL    MPV 11.6 9.2 - 12.9 fL    Immature Granulocytes 0.3 0.0 - 0.5 %    Gran # (ANC) 18.1 (H) 1.8 - 7.7 K/uL    Immature Grans (Abs) 0.07 (H) 0.00 - 0.04 K/uL    Lymph # 2.2 1.0 - 4.8 K/uL    Mono # 1.5 (H) 0.3 - 1.0 K/uL    Eos # 0.0 0.0 - 0.5 K/uL    Baso # 0.04 0.00 - 0.20 K/uL    nRBC 0 0 /100 WBC    Gran % 82.4 (H) 38.0 - 73.0 %    Lymph % 10.1 (L) 18.0 - 48.0 %    Mono % 7.0 4.0 - 15.0 %    Eosinophil % 0.0 0.0 - 8.0 %    Basophil % 0.2 0.0 - 1.9 %    Differential Method Automated    Lactic Acid, Plasma    Collection Time: 06/22/24  4:02 AM   Result Value Ref Range    Lactate (Lactic Acid) 1.5 0.5 - 2.2 mmol/L   POCT glucose    Collection Time: 06/22/24  4:44 AM   Result Value Ref Range    POCT Glucose 182 (H) 70 - 110 mg/dL   POCT glucose    Collection Time: 06/22/24  5:37 AM   Result Value Ref Range    POCT Glucose 178 (H) 70 - 110 mg/dL   POCT glucose    Collection Time: 06/22/24  6:31 AM   Result Value Ref Range    POCT Glucose 188 (H) 70 - 110 mg/dL   POCT glucose    Collection Time: 06/22/24  7:55 AM   Result Value Ref Range    POCT Glucose 128 (H) 70 - 110 mg/dL             Jose L Méndez MD

## 2024-06-22 NOTE — ASSESSMENT & PLAN NOTE
-History noted but no specific medications on home med list  -Patient reports she was diagnosed with SLE and RA many years ago in [place].  She is unsure if she was ever on any specific medications for these.  -Chart reviewed and last seen by Dr. 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  STEVE from 2022 was negative  -I doubt she actually has SLE or RA.

## 2024-06-22 NOTE — ASSESSMENT & PLAN NOTE
-Admitted to inpatient status  -Presented with shortness of breath.  In ED had wheezing and treated with continuous nebulizers and steroids.  She was admitted with presumptive COPD exacerbation and overnight developed worsening left knee pain, spike in blood pressure and myles respiratory distress requiring transfer to ICU and treatment with bipap.  -Noted history of former tobacco use, chronic bronchitis, chronic airflow limitation (no pfts available), chronic systolic chf and obesity.  -CXR initially was clear but after spike in blood pressure and respiratory distress the cxr showed new bilateral mixed interstitial and more focal airspace opacities with a bibasilar predominance   -Initial ABG showed a severe respiratory acidosis with pH 7.0 and pCO2 63.  -Flu and covid swabs are negative.  Procalcitonin is elevated.  Lactic acid is elevated  -Echo shows EF 40-50%, normal diastolic function and mild pulmonary hypertension with PASP 45mmHg  -This is acute hypoxic and hypercapnic respiratory failure.  She is not on oxygen at home.  -Pulmonology consulted and input appreciated.  Doubt pulmonary embolism.  More likely flash pulmonary edema due to elevated blood pressure due to pain - all in context of quite poor reserve and chronic illness.  -Cardiology consulted and input appreciated.  Will continue diuresis with IV lasix.  -Clinically much improved and without wheezing today.    -Continue to treat chronic pain as below.  -Noted leukocytosis but is afebrile and received steroids in ED and steroid injection to both knees yesterday.  Check sputum culture if able.  Will hold off on antibiotics for now, but have low threshold to start.  -Will continue supplemental oxygen and wean as able.  Continue bipap QHS.  -For now will hold off on further steroids due to DKA.  -Stable to transfer out of ICU today.

## 2024-06-22 NOTE — ASSESSMENT & PLAN NOTE
-Baseline Cr 1.2-1.5  -On admit Cr 1.9 and has had mild hyperkalemia and pseudohyponatremia due to hyperglycemia  -Cr bumped to 2.2 on 6/21 but is improved to 1.8 today.  -WILLIAM without any evidence of hydronephrosis  -No urine eosinophils.  No significant proteinuria  -Avoid nephrotoxic agents and renally dose meds  -Hold home torsemide and losartan for now  -Continue diuresis as above  -Repeat BMP in AM

## 2024-06-22 NOTE — HPI
79 year old with history of chronic bronchitis, CAD, T2DM, RA and CKD who presented to [facility] ED evening of 6/19 from her PCP's office due to shortness of breath and hypoxemia in clinic. History provided by chart review. Patient had reported she felt short of breath for a week and was having chest tightness and lower extremity swelling. She denied fevers, chills, or cough. In the ED patient's labs were notable for an RANJEET. Her initial O2 saturation was normal on room are and CXR was clear. In the ED, the patient had an episode of hypertension with SBP's in the 200's that was associated with some pain. She acutely became short of breath following that and follow up CXR showed bilateral pulmonary edema that was not present before. She was placed on BiPAP due to a component of hypercapnic respiratory failure on ABG and admitted to the ICU. Of note, patient's blood glucose was in the 200's on presentation but acutely went up to 900, after she had received steroids. Pulmonary and Critical Care consulted for respiratory failure.

## 2024-06-22 NOTE — RESPIRATORY THERAPY
Patient seen a little confused on NC. Placed patient back on BIPAP. Aerosol treatment given with no adverse reactions. Will continue to monitor.

## 2024-06-22 NOTE — ASSESSMENT & PLAN NOTE
Patient presenting with acute increase in blood glucose, presumably after steroid administration. No ketones on UA however this was prior to patient having significant increase in insulin. Concomittant metabolic acidosis with respiratory acidosis on labs, persisted following normalization of Co2 as well. Anion gap elevated, sent lactic and betahydroxybutyrate which were both elevated.    - Overall, consistent with DKA, insulin gtt started overnight to weight based normogram with better glycemic control achieved  - would NOT give fluids at this time, will need to watch glucose carefully  - Replaced potassium, will need to be followed up q4h to see if insulin drip needs to be held  - Gap closing, bicarb improving. Suspect can transition off gtt later today or this eveing.

## 2024-06-22 NOTE — ASSESSMENT & PLAN NOTE
-A1c 9.5 6/5/24  -On admit hyperglycemic with blood sugar 325, but not in DKA  -Treated with IV steroids in ED and developed DKA on 1st night (Blood sugar 901, anion gap 23, BOHB 3.8)  -DKA pathway initiated and she was treated with insulin drip, limited fluids due to pulmonary edema and we monitored accu check q1h and bmp q4h.  Anion gap has closed and she has been transitioned to subcutaneous insulin today  -Home regimen includes aspart 14 units tidwm and detemir 20 units qd +18 units qhs  -For now continue with detemir 18 units bid + SSI ac/hs

## 2024-06-22 NOTE — CONSULTS
[facility] - Intensive Care ([place])  Pulmonology  Consult Note    Patient Name: Indira Waters  MRN: [id number]  Admission Date: 6/20/2024  Hospital Length of Stay: 1 days  Code Status: Full Code  Attending Physician: Rey Lieberman MD  Primary Care Provider: Chun Zapata MD   Principal Problem: Acute respiratory failure with hypoxia and hypercapnia    Inpatient consult to Pulmonology  Consult performed by: Lio Rosales MD  Consult ordered by: Marifer Boudreaux PA-C        Subjective:     HPI:  79 year old with history of chronic bronchitis, CAD, T2DM, RA and CKD who presented to [facility] ED evening of 6/19 from her PCP's office due to shortness of breath and hypoxemia in clinic. History provided by chart review. Patient had reported she felt short of breath for a week and was having chest tightness and lower extremity swelling. She denied fevers, chills, or cough. In the ED patient's labs were notable for an RANJEET. Her initial O2 saturation was normal on room are and CXR was clear. In the ED, the patient had an episode of hypertension with SBP's in the 200's that was associated with some pain. She acutely became short of breath following that and follow up CXR showed bilateral pulmonary edema that was not present before. She was placed on BiPAP due to a component of hypercapnic respiratory failure on ABG and admitted to the ICU. Of note, patient's blood glucose was in the 200's on presentation but acutely went up to 900, after she had received steroids. Pulmonary and Critical Care consulted for respiratory failure.    Past Medical History:   Diagnosis Date    Arthritis     Back pain     Cancer     ovarian    Cervical cancer     Coronary artery disease     Depression     Diabetes mellitus     Fibromyalgia     Heart attack     History of MI (myocardial infarction)     Hyperlipidemia     Hypertension     Lupus     Stroke     slight left sided weakness       Past Surgical History:   Procedure  Laterality Date    APPENDECTOMY       SECTION      2    CORONARY ANGIOGRAPHY N/A 2022    Procedure: ANGIOGRAM, CORONARY ARTERY;  Surgeon: Jose L Méndez MD;  Location: [facility] CATH LAB;  Service: Cardiology;  Laterality: N/A;    HYSTERECTOMY      with USO for cervical cancer    INJECTION OF ANESTHETIC AGENT AROUND NERVE Bilateral 2021    Procedure: BLOCK, NERVE, SYMPATHIC;  Surgeon: Holden Pereira MD;  Location: [facility] PAIN MGT;  Service: Pain Management;  Laterality: Bilateral;    INJECTION OF ANESTHETIC AGENT AROUND NERVE N/A 2021    Procedure: BLOCK, NERVE, SYMPATHETIC  need consent;  Surgeon: Holden Pereira MD;  Location: [facility] PAIN MGT;  Service: Pain Management;  Laterality: N/A;    IA EVAL,SWALLOW FUNCTION,CINE/VIDEO RECORD  2021         TONSILLECTOMY      TRIAL OF SPINAL CORD NERVE STIMULATOR N/A 3/8/2023    Procedure: LUMBAR SPINAL CORD STIMULATOR TRIAL NEVRO REP PATIENT STATES SHE NO LONGER TAKES PLAVIX;  Surgeon: Holden Pereira MD;  Location: [facility] PAIN MGT;  Service: Pain Management;  Laterality: N/A;       Review of patient's allergies indicates:   Allergen Reactions    Bleach (sodium hypochlorite) Shortness Of Breath    Nitrofurantoin macrocrystalline Anaphylaxis    Lipitor [atorvastatin] Diarrhea and Rash    Nsaids (non-steroidal anti-inflammatory drug)      Tolerates aspirin      Pcn [penicillins]     Toradol [ketorolac]        Family History       Problem Relation (Age of Onset)    COPD Mother    Colon cancer Maternal Grandmother    Coronary artery disease Father    Diabetes Father    Hernia Mother    Lupus Mother    Ovarian cancer Mother    Uterine cancer Mother          Tobacco Use    Smoking status: Former     Current packs/day: 0.00     Types: Cigarettes     Quit date: 2020     Years since quitting: 3.6     Passive exposure: Past    Smokeless tobacco: Never   Substance and Sexual Activity    Alcohol use: Not Currently     Comment: occasionally    Drug  use: Yes     Types: Hydrocodone     Comment: three times a day    Sexual activity: Not Currently         Review of Systems   Unable to perform ROS: Acuity of condition     Objective:     Vital Signs (Most Recent):  Temp: 98.6 °F (37 °C) (06/21/24 1901)  Pulse: 70 (06/21/24 2318)  Resp: 19 (06/21/24 2318)  BP: (!) 109/53 (06/21/24 2055)  SpO2: (!) 94 % (06/21/24 2318) Vital Signs (24h Range):  Temp:  [98 °F (36.7 °C)-98.9 °F (37.2 °C)] 98.6 °F (37 °C)  Pulse:  [] 70  Resp:  [9-29] 19  SpO2:  [91 %-100 %] 94 %  BP: ()/(52-90) 109/53     Weight: 90.7 kg (200 lb)  Body mass index is 39.06 kg/m².      Intake/Output Summary (Last 24 hours) at 6/21/2024 2346  Last data filed at 6/21/2024 1904  Gross per 24 hour   Intake 350 ml   Output 2750 ml   Net -2400 ml        Physical Exam  Vitals and nursing note reviewed.   Constitutional:       General: She is not in acute distress.     Appearance: She is not toxic-appearing.   HENT:      Nose: Nose normal.      Mouth/Throat:      Mouth: Mucous membranes are moist.   Eyes:      General: No scleral icterus.     Extraocular Movements: Extraocular movements intact.      Pupils: Pupils are equal, round, and reactive to light.   Cardiovascular:      Rate and Rhythm: Normal rate and regular rhythm.      Pulses: Normal pulses.      Heart sounds: Normal heart sounds.   Pulmonary:      Effort: Pulmonary effort is normal.      Breath sounds: Rales (bilaterally) present.      Comments: On BiPAP 12/5 and 60% FiO2  Abdominal:      General: There is no distension.      Palpations: Abdomen is soft.      Tenderness: There is no abdominal tenderness.   Musculoskeletal:      Right lower leg: Edema present.      Left lower leg: Edema present.   Skin:     General: Skin is warm and dry.   Neurological:      General: No focal deficit present.      Mental Status: She is alert.          Vents:  Oxygen Concentration (%): 40 (06/21/24 1944)    Lines/Drains/Airways       Drain  Duration              Female External Urinary Catheter w/ Suction 06/20/24 2340 1 day              Peripheral Intravenous Line  Duration                  Midline Catheter - Double Lumen 06/21/24 1334 Right cephalic vein (lateral side of arm) <1 day         Peripheral IV - Single Lumen 06/21/24 0204 20 G Right Antecubital <1 day                    Significant Labs:    CBC/Anemia Profile:  Recent Labs   Lab 06/20/24  1103 06/20/24  1608 06/21/24  0156 06/21/24  0914 06/21/24  1527   WBC 12.42 11.59  --  15.68*  15.68*  --    HGB 10.3* 10.4*  --  11.3*  11.3*  --    HCT 33.0* 32.0* 47 36.2*  36.2* 34*    276  --  345  345  --    MCV 88 86  --  88  88  --    RDW 15.9* 15.9*  --  15.9*  15.9*  --         Chemistries:  Recent Labs   Lab 06/20/24  1103 06/20/24  1608 06/21/24  0409 06/21/24  0913 06/21/24  0914 06/21/24  1321 06/21/24  1951 06/21/24  2101   * 133* 129*   < >  --  138 138 139   K 4.5 5.0 5.3*   < > 5.2* 3.9 5.0 4.7   CL 96 95 95   < >  --  103 103 102   CO2 21* 22* 11*   < >  --  20* 20* 25   BUN 35* 33* 35*   < >  --  39* 44* 46*   CREATININE 1.9* 1.9* 2.1*   < >  --  1.8* 1.9* 1.9*   CALCIUM 9.5 9.2 8.7   < >  --  8.4* 9.0 9.1   ALBUMIN 3.5 3.5  --   --   --   --   --   --    PROT 6.9 7.1  --   --   --   --   --   --    BILITOT 0.2 0.1  --   --   --   --   --   --    ALKPHOS 54* 53*  --   --   --   --   --   --    ALT 14 15  --   --   --   --   --   --    AST 14 22  --   --   --   --   --   --    MG  --   --  2.5  --  2.4  --   --   --     < > = values in this interval not displayed.     ABG  Recent Labs   Lab 06/21/24 2000   PH 7.446   PO2 66*   PCO2 42.1   HCO3 29.0*   BE 5*         All pertinent labs within the past 24 hours have been reviewed.    Significant Imaging:   I have reviewed all pertinent imaging results/findings within the past 24 hours.    Echo    Left Ventricle: The left ventricle is normal in size. Normal wall   thickness. Global hypokinesis present. There is mildly reduced systolic    function with a visually estimated ejection fraction of 40 - 50%. There is   normal diastolic function.    Right Ventricle: Normal right ventricular cavity size. Wall thickness   is normal. Systolic function is normal.    Pulmonary Artery: There is mild pulmonary hypertension. The estimated   pulmonary artery systolic pressure is 45 mmHg.  X-Ray Knee 1 or 2 View Left  Narrative: EXAMINATION:  XR KNEE 1 OR 2 VIEW LEFT    CLINICAL HISTORY:  left knee;    TECHNIQUE:  One or two views of the left knee were performed.    COMPARISON:  03/14/2024.    FINDINGS:  There is osteopenia.  There is no acute fracture or dislocation. Alignment is normal. Joint spaces are preserved. No joint effusion.  Impression: No acute osseous abnormality of the knee.    Electronically signed by: Ramin Krishnamurthy  Date:    06/21/2024  Time:    08:40  X-Ray Chest AP Portable  Narrative: EXAMINATION:  XR CHEST AP PORTABLE    CLINICAL HISTORY:  Acute Respiratory Failure;    TECHNIQUE:  Single frontal view of the chest was performed.    COMPARISON:  06/20/2024    FINDINGS:  Cardiac monitoring leads overlie the chest.  The cardiomediastinal silhouette is unchanged in size and configuration.  The lungs are symmetrically expanded with interval detrimental change in lung aeration with new bilateral mixed interstitial and more focal airspace opacities with a bibasilar predominance.  Findings can be seen with edema, infectious or non-infectious inflammatory process.  No evidence of pneumothorax.    This report was flagged in Epic as abnormal.  Impression: Please see above.    Electronically signed by: Shantel Gonzalez MD  Date:    06/21/2024  Time:    05:09      Assessment/Plan:     Pulmonary  * Acute respiratory failure with hypoxia and hypercapnia  Patient presenting with complaints of shortness of breath and leg swelling x 1 week. Desaturated in PCP office and sent to the ED. In the ED patient with clear CXR and saturating well on room air. Received  "nebulizer therapy and steroids for possible COPD exacerbation. Patient with diagnosis of "chronic airflow limitation" but unclear where this diagnosis came from and no PFTs in the system. Patient is on inhalers at home. After episode of significant hypertension presumably 2/2 pain patient had decompensation and began having acute hypoxemia. Blood gas at that time with respiratory acidosis however did also appear to have concomitant metabolic acidosis. Was placed on BiPAP and repeat CXR with new bilateral interstitial opacities consistent with pulmonary edema.    Overall, this picture is very consistent with flash pulmonary edema 2/2 hypertensive event. Bedside POCUS with bilateral B-line predominance up to the apices of the lung. Good cardiac function with no WMA and no pericardial effusion.    - Continue home meds, good BP control at this time  - Giving lasix as overloaded clinically on exam, excellent urine output with 80mg x1, will give additional dose tonight  - BiPAP QHS and PRN given volume overload.  - Low suspicion for PE, stopped heparin gtt  -Will need pulmonary follow up and PFTs  - OK for nebs, would avoid steroids    Endocrine  Diabetic ketoacidosis without coma associated with type 2 diabetes mellitus  Patient presenting with acute increase in blood glucose, presumably after steroid administration. No ketones on UA however this was prior to patient having significant increase in insulin. Concomittant metabolic acidosis with respiratory acidosis on labs, persisted following normalization of Co2 as well. Anion gap elevated, sent lactic and betahydroxybutyrate which were both elevated.    - Overall, consistent with DKA, insulin gtt started overnight to weight based normogram with better glycemic control achieved  - would NOT give fluids at this time, will need to watch glucose carefully  - Replaced potassium, will need to be followed up q4h to see if insulin drip needs to be held  - Gap closing, bicarb " improving. Suspect can transition off gtt later today or this eveing.          Thank you for your consult. I will follow-up with patient. Please contact us if you have any additional questions.     Lio Rosales MD  Pulmonology  [facility] - Intensive Care ([place])

## 2024-06-22 NOTE — ASSESSMENT & PLAN NOTE
-Noted history and follows with pain management.  -Home med list includes hydrocodone/acetaminophen 10/325 q8h prn, robaxin 500mg tid prn, lyrica 150mg TID  -Complains of left knee pain now but nothing obviously inflamed on my exam  -xray of left knee showed arthritic changes  -Consulted orthopedic surgery and input appreciated.  She underwent steroid injections to each knee on 6/21  -Continue allopurinol, hydrocodone, robaxin as at home  -Continue lidoderm patch to each knee.  Consider resumption of lyrica if she remains stable and has pain  -Consult PT/OT

## 2024-06-22 NOTE — ASSESSMENT & PLAN NOTE
-On admit appeared euvolemic.  BNP not significantly elevated and CXR without evidence of pulmonary edema  -Overnight 6/20-6/21 developed respiratory distress associated with elevated blood pressure likely secondary to pain.  Repeat CXR showed pulmonary edema (not present on initial cxr).  -Echo reviewed as above - notable for mildly depressed EF (previously normal) and normal diastolic function  -This is acute on chronic combined systolic and diastolic chf  -Cardiology consulted and input appreciated    -Strict ins/outs and daily weights  -Noted slight bump in creatinine - will hold losartan for now  -Continue diuresis with iv lasix (at home was on torsemide 20mg qd)  -Continue blood pressure control with clonidine, toprol and nifedipine.

## 2024-06-22 NOTE — PROGRESS NOTES
[facility] Pulmonary & Critical Care Medicine Progress Note    Subjective:      Overnight, no acute events. Gap closed. Did have some noted agitation and got a dose of ativan. This morning, wakes easily and answers all questions. Hoping to come off the BIPAP soon. Denies SOB or pain.      Objective:     Last 24 Hour Vital Signs:  BP  Min: 98/57  Max: 146/65  Temp  Av.8 °F (37.1 °C)  Min: 98.5 °F (36.9 °C)  Max: 99.3 °F (37.4 °C)  Pulse  Av.5  Min: 70  Max: 112  Resp  Av.4  Min: 7  Max: 26  SpO2  Av %  Min: 91 %  Max: 100 %  Height  Av' (152.4 cm)  Min: 5' (152.4 cm)  Max: 5' (152.4 cm)  Weight  Av.7 kg (200 lb)  Min: 90.7 kg (200 lb)  Max: 90.7 kg (200 lb)  I/O last 3 completed shifts:  In: 495.9 [P.O.:300; I.V.:195.9]  Out: 3550 [Urine:3550]    Physical Examination:  Physical Exam  Vitals and nursing note reviewed.   Constitutional:       General: She is not in acute distress.     Appearance: She is not toxic-appearing.      Comments: On bipap, wakes easily    HENT:      Head: Normocephalic.      Mouth/Throat:      Mouth: Mucous membranes are moist.      Comments: BIPAP mask in place  Eyes:      General:         Right eye: No discharge.         Left eye: No discharge.      Conjunctiva/sclera: Conjunctivae normal.   Cardiovascular:      Rate and Rhythm: Normal rate.   Pulmonary:      Effort: Pulmonary effort is normal. No respiratory distress.      Breath sounds: Normal breath sounds. No wheezing or rhonchi.      Comments: No crackles  Abdominal:      General: There is no distension.      Palpations: Abdomen is soft.   Musculoskeletal:      Right lower leg: Edema present.      Left lower leg: Edema present.      Comments: Some mild non-pitting b/l LE edema   Skin:     General: Skin is warm and dry.   Neurological:      General: No focal deficit present.      Mental Status: She is alert and oriented to person, place, and time.           Laboratory:  Trended Lab Data:  Recent Labs      06/20/24  1103 06/20/24  1608 06/21/24  0156 06/21/24  0409 06/21/24  0913 06/21/24  0914 06/21/24  1321 06/21/24  1527 06/21/24  1951 06/21/24  2101 06/22/24  0317 06/22/24  0402   WBC 12.42 11.59  --   --   --  15.68*  15.68*  --   --   --   --   --  22.00*   HGB 10.3* 10.4*  --   --   --  11.3*  11.3*  --   --   --   --   --  10.7*   HCT 33.0* 32.0*   < >  --   --  36.2*  36.2*  --  34*  --   --   --  32.8*    276  --   --   --  345  345  --   --   --   --   --  283   * 133*  --  129*   < >  --    < >  --    < > 139 142 142   K 4.5 5.0  --  5.3*   < > 5.2*   < >  --    < > 4.7 4.3 4.3   CL 96 95  --  95   < >  --    < >  --    < > 102 102 102   CO2 21* 22*  --  11*   < >  --    < >  --    < > 25 27 27   BUN 35* 33*  --  35*   < >  --    < >  --    < > 46* 46* 46*   CREATININE 1.9* 1.9*  --  2.1*   < >  --    < >  --    < > 1.9* 1.8* 1.8*   * 382*  --  842*   < >  --    < >  --    < > 195* 127* 151*   BILITOT 0.2 0.1  --   --   --   --   --   --   --   --   --   --    AST 14 22  --   --   --   --   --   --   --   --   --   --    ALT 14 15  --   --   --   --   --   --   --   --   --   --    ALKPHOS 54* 53*  --   --   --   --   --   --   --   --   --   --    CALCIUM 9.5 9.2  --  8.7   < >  --    < >  --    < > 9.1 8.8 9.1   ALBUMIN 3.5 3.5  --   --   --   --   --   --   --   --   --   --    PROT 6.9 7.1  --   --   --   --   --   --   --   --   --   --    MG  --   --   --  2.5  --  2.4  --   --   --   --   --   --    PHOS  --   --   --   --   --   --   --   --   --   --  2.7  --    INR  --   --   --   --   --  1.0  --   --   --   --   --   --     < > = values in this interval not displayed.       Cardiac:   Recent Labs   Lab 06/20/24  1103 06/20/24  1608 06/20/24  1806   TROPONINI  --  0.009 0.006   * 132*  --        Urinalysis:   Lab Results   Component Value Date    LABURIN No growth 01/26/2023    COLORU Colorless (A) 06/21/2024    CLARITYU Clear 07/21/2022    SPECGRAV 1.010  06/21/2024    NITRITE Negative 06/21/2024    KETONESU Negative 06/21/2024    UROBILINOGEN Negative 06/21/2024       Microbiology:  Microbiology Results (last 7 days)       Procedure Component Value Units Date/Time    Respiratory Infection Panel (PCR), Nasopharyngeal [[id number]] Collected: 06/21/24 1536    Order Status: Completed Specimen: Nasopharyngeal Swab Updated: 06/21/24 0002     Respiratory Infection Panel Source NP Swab     Adenovirus Not Detected     Coronavirus 229E, Common Cold Virus Not Detected     Coronavirus HKU1, Common Cold Virus Not Detected     Coronavirus NL63, Common Cold Virus Not Detected     Coronavirus OC43, Common Cold Virus Not Detected     Comment: The Coronavirus strains detected in this test cause the common cold.  These strains are not the COVID-19 (novel Coronavirus)strain   associated with the respiratory disease outbreak.          SARS-CoV2 (COVID-19) Qualitative PCR Not Detected     Human Metapneumovirus Not Detected     Human Rhinovirus/Enterovirus Not Detected     Influenza A (subtypes H1, H1-2009,H3) Not Detected     Influenza B Not Detected     Parainfluenza Virus 1 Not Detected     Parainfluenza Virus 2 Not Detected     Parainfluenza Virus 3 Not Detected     Parainfluenza Virus 4 Not Detected     Respiratory Syncytial Virus Not Detected     Bordetella Parapertussis (DC8865) Not Detected     Bordetella pertussis (ptxP) Not Detected     Chlamydia pneumoniae Not Detected     Mycoplasma pneumoniae Not Detected    Narrative:      Assay not valid for lower respiratory specimens, alternate  testing required.    Culture, Respiratory with Gram Stain [[id number]]     Order Status: No result Specimen: Respiratory     Respiratory Infection Panel (PCR), Nasopharyngeal [[id number]]     Order Status: Canceled Specimen: Nasopharyngeal Swab     Influenza A & B by Molecular [[id number]]     Order Status: Canceled Specimen: Nasopharyngeal Swab             Radiology:  None new     I have  "personally reviewed the above labs and imaging.      Assessment:     Indira Waters is a 79 y.o.female with h/o reported chronic bronchitis (no PFTs), CHF, T2DM, osteoarthritis, CKD3 who presented with SOB chronically worsening over 1 week then had acute inc in BP and likely flash pulmonary edema requiring BIPAP. Given steroids for possible COPD/Bronchitis and glucose went up with new anion gap acidosis and so started on insulin drip for DKA. Today, DKA has resolved and breathing is significantly better and she is now net negative 3L. Can come off bipap and use qhs and with naps.      Plan:     Pulmonary  * Acute respiratory failure with hypoxia and hypercapnia  Patient presenting with complaints of shortness of breath and leg swelling x 1 week. Desaturated in PCP office and sent to the ED. In the ED patient with clear CXR and saturating well on room air. Received nebulizer therapy and steroids for possible COPD exacerbation. Patient with diagnosis of "chronic airflow limitation" but unclear where this diagnosis came from and no PFTs in the system. Patient is on inhalers at home. After episode of significant hypertension presumably 2/2 pain patient had decompensation and began having acute hypoxemia. Blood gas at that time with respiratory acidosis however did also appear to have concomitant metabolic acidosis. Was placed on BiPAP and repeat CXR with new bilateral interstitial opacities consistent with pulmonary edema.     Overall, this picture is very consistent with flash pulmonary edema 2/2 hypertensive event. Bedside POCUS on admit with bilateral B-line predominance up to the apices of the lung. Good cardiac function with no WMA and no pericardial effusion.     - Continue home meds, good BP control at this time  - Good UOP with Lasix, negative 3L since admit, continue diuresis   - BiPAP QHS and PRN given volume overload   - Low suspicion for PE, stopped heparin gtt  - Will need pulmonary follow up and " PFTs  - OK for nebs, would avoid steroids due to significant hyperglycemia associated      Endocrine  Diabetic ketoacidosis without coma associated with type 2 diabetes mellitus, resolved   Patient presenting with acute increase in blood glucose after steroid administration. Concomitant metabolic acidosis with respiratory acidosis on labs, persisted following normalization of Co2 as well.      - Overall, consistent with DKA, insulin gtt started overnight to weight based normogram with better glycemic control achieved - gap closed, transition to Lantus this morning   - Would NOT give more fluids at this time, will need to watch glucose carefully        VTE ppx: heparin TID  GI ppx: not indicated   Dispo: Stable for step down from ICU       Karissa Qureshi MD  [employer] Pul[facility] Critical Care Medicine Fellow

## 2024-06-22 NOTE — ASSESSMENT & PLAN NOTE
-Bp normal initially but spiked overnight 6/20-6/21 to 204/77 at [facility]   -BP now normal to mildly elevated  -Continue clonidine, toprol and nifedipine  -Hold losartan for now given bump in creatinine  -Prn hdyralazine available for SBP > 180

## 2024-06-22 NOTE — CONSULTS
Chief complaint-bilateral knee pain    79-year-old female with multiple medical problems including bronchitis, hypertension, diabetes, GERD, rheumatoid arthritis, fibromyalgia.  Consult obtained for bilateral knee pain.  Patient apparently has a long history of pain in both knees.  Consultation was obtained for cortisone injections in both knees.    Past medical history-coronary artery disease, depression, diabetes, fibromyalgia, lupus ,hypertension, CVA.    Allergies-bleach, nitrofurantoin, Lipitor, nonsteroidal anti-inflammatories, penicillin, Toradol    Medications-allopurinol, aspirin, Lipitor, Catapres, Bentyl, Flonase, Norco, insulin Protonix, Procardia XL, Lyrica, Actos    Exam-exam of both knees demonstrate mild swelling with mainly tenderness over the medial joint line bilaterally.  Both knees stable to exam.  Does have some lower extremity edema bilaterally.    X-rays of the left knee demonstrate narrowing of the medial compartment consistent with osteoarthritis.    Impression-1.osteoarthritis both knees  2. Bilateral knee pain     Plan-I have injected both knees under sterile technique with 40 mg of Depo-Medrol with 2 cc of 1% plain xylocaine.  Follow-up as needed.

## 2024-06-22 NOTE — ASSESSMENT & PLAN NOTE
-History noted in chart, but unclear if truly has RA  -RF and CCP negative 2022  -Refiewed last rheumatology note from 2022 and no evidence of connective tissue disorder or RA at that time.    -I doubt she actually has SLE or RA

## 2024-06-22 NOTE — PLAN OF CARE
Problem: Adult Inpatient Plan of Care  Goal: Plan of Care Review  Outcome: Progressing     Problem: Adult Inpatient Plan of Care  Goal: Absence of Hospital-Acquired Illness or Injury  Outcome: Progressing     Problem: COPD (Chronic Obstructive Pulmonary Disease)  Goal: Optimal Chronic Illness Coping  Outcome: Progressing     Problem: COPD (Chronic Obstructive Pulmonary Disease)  Goal: Effective Oxygenation and Ventilation  Outcome: Progressing     Problem: Fall Injury Risk  Goal: Absence of Fall and Fall-Related Injury  Outcome: Progressing     Problem: Wound  Goal: Skin Health and Integrity  Outcome: Progressing     Problem: Infection  Goal: Absence of Infection Signs and Symptoms  Outcome: Progressing

## 2024-06-22 NOTE — PROGRESS NOTES
[facility] [place] [facility] Medicine  Progress Note    Patient Name: Indira Waters  MRN: [id number]  Patient Class: IP- Inpatient   Admission Date: 6/20/2024  Length of Stay: 1 days  Attending Physician: Rey Lieberman MD  Primary Care Provider: Chun Zapata MD        Subjective:     Principal Problem:Acute respiratory failure with hypoxia and hypercapnia        HPI:  Ms. Indira Waters is a 79 y.o. female, with PMH of Chronic pain, Chronic airflow limitation, chronic bronchitis, former smoker, CAD, HTN, T2DM, Diabetic polyneuropathy, RA, GERD, MDD, anxiety, fibromyalgia, h/o cervical cancer, obesity, CDK-4, SLE, who presented to [facility] ED on 6/20/24 from her PCPs office due to shortness of breath that occurs when it rains outside with associated hypoxia in the office. She states she has felt short of breath all week, and that at baseline she has FERRIS, which is now also occurring at rest and is associated with chest tightness. She further notes onset of lower extremity swelling. She denied fever, chills, cough. She does not utilize home O2. ED workup with labs including a CBC which shows anemia with H&H of 10.4/32.0, but no leukocytosis or left shift. A metabolic panel showed RANJEET with BUN 33, and Cr of 1.9 without hyperkalemia. CO2 was 22, and anion gap was 16, glucose was 382. BNP was 132, without elevation of troponin, and with CTX showing no acute cardiopulmonary process. A D-dimer was not elevated. She was treated in the ED with 3 DuoNebs, one hour long neb, and IV steroids. Initially she was maintaining adequate O2 sats on room air. At about midnight her BP jerri to 204/77, when she was apparently reporting pain, and then at about 01:40 she had an acute decline in O2 sats with worsened shortness of breath. An ABG was ordered and showed CO2 retention, she was placed on BiPap. Orders were changed from tele to ICU. She was placed on observation.     Overview/Hospital Course:  No  notes on file    Interval History: No acute events overnight.  Did have some agitation requiring ativan x1.  Today she looks much improved and is breathing comfortably on nasal canula O2.  She reports she diagnosed with RA and SLE many years ago in [place] - but is unsure if she was every on specific medications.  We discussed her 2022 rheumatology visit and negative lab workup for these at that time.  I don't think she has SLE or RA.  All questions answered and patient had no further complaints.    Objective:     Vital Signs (Most Recent):  Temp: 99.1 °F (37.3 °C) (06/22/24 0701)  Pulse: 96 (06/22/24 0901)  Resp: (!) 21 (06/22/24 0901)  BP: (!) 143/65 (06/22/24 0901)  SpO2: 95 % (06/22/24 0901) Vital Signs (24h Range):  Temp:  [98.5 °F (36.9 °C)-99.3 °F (37.4 °C)] 99.1 °F (37.3 °C)  Pulse:  [70-97] 96  Resp:  [7-26] 21  SpO2:  [89 %-100 %] 95 %  BP: ()/(48-69) 143/65     Weight: 90.7 kg (200 lb)  Body mass index is 39.06 kg/m².    Intake/Output Summary (Last 24 hours) at 6/22/2024 1155  Last data filed at 6/22/2024 0659  Gross per 24 hour   Intake 445.89 ml   Output 2200 ml   Net -1754.11 ml         Physical Exam  Vitals and nursing note reviewed.   Constitutional:       General: She is not in acute distress.     Appearance: She is well-developed. She is obese. She is ill-appearing (chronically). She is not toxic-appearing or diaphoretic.   HENT:      Head: Normocephalic and atraumatic.      Mouth/Throat:      Mouth: Mucous membranes are moist.   Eyes:      Extraocular Movements: Extraocular movements intact.   Neck:      Trachea: No tracheal deviation.   Cardiovascular:      Rate and Rhythm: Regular rhythm. Tachycardia present.      Heart sounds: Normal heart sounds. No murmur heard.     No gallop.      Comments: Trace LLE edema.     Pulmonary:      Comments: Breathing comfortably on bipap, overall good air movement without wheezing, but with diffuse loud course rales  Abdominal:      General: Bowel sounds  are normal. There is no distension.      Palpations: Abdomen is soft. There is no mass.      Tenderness: There is no abdominal tenderness. There is no guarding.   Musculoskeletal:         General: Swelling: left knee. Tenderness: left knee.      Cervical back: Normal range of motion.      Comments: Lets are mildly edematous.  Left knee without myles swelling, warmth or redness and is not tender to superficial palpation   Skin:     General: Skin is warm and dry.      Coloration: Skin is not pale.      Findings: No erythema or rash.      Comments: Left knee with significant warm with palpation. No erythema or fluctuance with palpation.    Neurological:      General: No focal deficit present.      Mental Status: She is alert and oriented to person, place, and time.      Cranial Nerves: No cranial nerve deficit.      Motor: No abnormal muscle tone.   Psychiatric:         Mood and Affect: Mood normal.             Significant Labs: All pertinent labs within the past 24 hours have been reviewed.    Significant Imaging: I have reviewed all pertinent imaging results/findings within the past 24 hours.    Assessment/Plan:      * Acute respiratory failure with hypoxia and hypercapnia  -Admitted to inpatient status  -Presented with shortness of breath.  In ED had wheezing and treated with continuous nebulizers and steroids.  She was admitted with presumptive COPD exacerbation and overnight developed worsening left knee pain, spike in blood pressure and myles respiratory distress requiring transfer to ICU and treatment with bipap.  -Noted history of former tobacco use, chronic bronchitis, chronic airflow limitation (no pfts available), chronic systolic chf and obesity.  -CXR initially was clear but after spike in blood pressure and respiratory distress the cxr showed new bilateral mixed interstitial and more focal airspace opacities with a bibasilar predominance   -Initial ABG showed a severe respiratory acidosis with pH 7.0 and  pCO2 63.  -Flu and covid swabs are negative.  Procalcitonin is elevated.  Lactic acid is elevated  -Echo shows EF 40-50%, normal diastolic function and mild pulmonary hypertension with PASP 45mmHg  -This is acute hypoxic and hypercapnic respiratory failure.  She is not on oxygen at home.  -Pulmonology consulted and input appreciated.  Doubt pulmonary embolism.  More likely flash pulmonary edema due to elevated blood pressure due to pain - all in context of quite poor reserve and chronic illness.  -Cardiology consulted and input appreciated.  Will continue diuresis with IV lasix.  -Clinically much improved and without wheezing today.    -Continue to treat chronic pain as below.  -Noted leukocytosis but is afebrile and received steroids in ED and steroid injection to both knees yesterday.  Check sputum culture if able.  Will hold off on antibiotics for now, but have low threshold to start.  -Will continue supplemental oxygen and wean as able.  Continue bipap QHS.  -For now will hold off on further steroids due to DKA.  -Stable to transfer out of ICU today.    Acute on chronic combined systolic and diastolic congestive heart failure  -On admit appeared euvolemic.  BNP not significantly elevated and CXR without evidence of pulmonary edema  -Overnight 6/20-6/21 developed respiratory distress associated with elevated blood pressure likely secondary to pain.  Repeat CXR showed pulmonary edema (not present on initial cxr).  -Echo reviewed as above - notable for mildly depressed EF (previously normal) and normal diastolic function  -This is acute on chronic combined systolic and diastolic chf  -Cardiology consulted and input appreciated    -Strict ins/outs and daily weights  -Noted slight bump in creatinine - will hold losartan for now  -Continue diuresis with iv lasix (at home was on torsemide 20mg qd)  -Continue blood pressure control with clonidine, toprol and nifedipine.    Essential hypertension  -Bp normal initially  but spiked overnight 6/20-6/21 to 204/77 at [facility]   -BP now normal to mildly elevated  -Continue clonidine, toprol and nifedipine  -Hold losartan for now given bump in creatinine  -Prn hdyralazine available for SBP > 180    Diabetic ketoacidosis without coma associated with type 2 diabetes mellitus  -A1c 9.5 6/5/24  -On admit hyperglycemic with blood sugar 325, but not in DKA  -Treated with IV steroids in ED and developed DKA on 1st night (Blood sugar 901, anion gap 23, BOHB 3.8)  -DKA pathway initiated and she was treated with insulin drip, limited fluids due to pulmonary edema and we monitored accu check q1h and bmp q4h.  Anion gap has closed and she has been transitioned to subcutaneous insulin today  -Home regimen includes aspart 14 units tidwm and detemir 20 units qd +18 units qhs  -For now continue with detemir 18 units bid + SSI ac/hs    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 pectoris  -Noted history  -Denies chest pain  -Echo without any notable segmental wall motion abnormalities  -Continue aspirin, toprol and statin     Chronic pain syndrome  -Noted history and follows with pain management.  -Home med list includes hydrocodone/acetaminophen 10/325 q8h prn, robaxin 500mg tid prn, lyrica 150mg TID  -Complains of left knee pain now but nothing obviously inflamed on my exam  -xray of left knee showed arthritic changes  -Consulted orthopedic surgery and input appreciated.  She underwent steroid injections to each knee on 6/21  -Continue allopurinol, hydrocodone, robaxin as at home  -Continue lidoderm patch to each knee.  Consider resumption of lyrica if she remains stable and has pain  -Consult PT/OT    Diabetic polyneuropathy  -Takes lyrica at home - holding for now to ensure stability of respiratory status    Stage 4 chronic kidney disease  -Baseline Cr 1.2-1.5  -On admit Cr 1.9 and has had mild hyperkalemia and pseudohyponatremia due to hyperglycemia  -Cr bumped to 2.2 on  6/21 but is improved to 1.8 today.  -WILLIAM without any evidence of hydronephrosis  -No urine eosinophils.  No significant proteinuria  -Avoid nephrotoxic agents and renally dose meds  -Hold home torsemide and losartan for now  -Continue diuresis as above  -Repeat BMP in AM    Systemic lupus erythematosus, unspecified  -History noted but no specific medications on home med list  -Patient reports she was diagnosed with SLE and RA many years ago in [place].  She is unsure if she was ever on any specific medications for these.  -Chart reviewed and last seen by Dr. 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  STEVE from 2022 was negative  -I doubt she actually has SLE or RA.    Rheumatoid arthritis, unspecified  -History noted in chart, but unclear if truly has RA  -RF and CCP negative 2022  -Refiewed last rheumatology note from 2022 and no evidence of connective tissue disorder or RA at that time.    -I doubt she actually has SLE or RA    Hemiplegia and hemiparesis following cerebral infarction affecting left non-dominant side  -History noted  -Fall precautions ordered  -Consult PT/OT    Class 2 obesity with body mass index (BMI) of 39.0 to 39.9 in adult  Body mass index is 39.06 kg/m². Morbid obesity complicates all aspects of disease management from diagnostic modalities to treatment. Weight loss encouraged and health benefits explained to patient.    Noncompliance with medication regimen  -History noted       VTE Risk Mitigation (From admission, onward)           Ordered     heparin (porcine) injection 5,000 Units  Every 8 hours (non-standard times)         06/21/24 1008     IP VTE HIGH RISK PATIENT  Once         06/21/24 1008     Place SUHAS hose  Until discontinued         06/21/24 1008     Place sequential  compression device  Until discontinued         06/21/24 1008     Place sequential compression device  Until discontinued         06/21/24 0010     Place sequential compression device  Until discontinued         06/20/24 5566                    Discharge Planning   KYLE:      Code Status: Full Code   Is the patient medically ready for discharge?:     Reason for patient still in hospital (select all that apply): Treatment  Discharge Plan A: Home Health                  Rey Lieberman MD  Department of Hospital Medicine   [facility] ([facility])

## 2024-06-23 LAB
ALBUMIN SERPL BCP-MCNC: 3 G/DL (ref 3.5–5.2)
ALLENS TEST: ABNORMAL
ALP SERPL-CCNC: 59 U/L (ref 55–135)
ALT SERPL W/O P-5'-P-CCNC: 14 U/L (ref 10–44)
ANION GAP SERPL CALC-SCNC: 14 MMOL/L (ref 8–16)
ANION GAP SERPL CALC-SCNC: 16 MMOL/L (ref 8–16)
ANION GAP SERPL CALC-SCNC: 22 MMOL/L (ref 8–16)
AST SERPL-CCNC: 26 U/L (ref 10–40)
BASOPHILS # BLD AUTO: 0.03 K/UL (ref 0–0.2)
BASOPHILS NFR BLD: 0.1 % (ref 0–1.9)
BILIRUB SERPL-MCNC: 0.3 MG/DL (ref 0.1–1)
BNP SERPL-MCNC: 667 PG/ML (ref 0–99)
BUN SERPL-MCNC: 30 MG/DL (ref 8–23)
BUN SERPL-MCNC: 32 MG/DL (ref 8–23)
BUN SERPL-MCNC: 37 MG/DL (ref 8–23)
CALCIUM SERPL-MCNC: 9.1 MG/DL (ref 8.7–10.5)
CALCIUM SERPL-MCNC: 9.2 MG/DL (ref 8.7–10.5)
CALCIUM SERPL-MCNC: 9.3 MG/DL (ref 8.7–10.5)
CHLORIDE SERPL-SCNC: 97 MMOL/L (ref 95–110)
CHLORIDE SERPL-SCNC: 97 MMOL/L (ref 95–110)
CHLORIDE SERPL-SCNC: 99 MMOL/L (ref 95–110)
CO2 SERPL-SCNC: 19 MMOL/L (ref 23–29)
CO2 SERPL-SCNC: 23 MMOL/L (ref 23–29)
CO2 SERPL-SCNC: 25 MMOL/L (ref 23–29)
CREAT SERPL-MCNC: 1.6 MG/DL (ref 0.5–1.4)
DIFFERENTIAL METHOD BLD: ABNORMAL
EOSINOPHIL # BLD AUTO: 0 K/UL (ref 0–0.5)
EOSINOPHIL NFR BLD: 0 % (ref 0–8)
ERYTHROCYTE [DISTWIDTH] IN BLOOD BY AUTOMATED COUNT: 16 % (ref 11.5–14.5)
EST. GFR  (NO RACE VARIABLE): 33 ML/MIN/1.73 M^2
GLUCOSE SERPL-MCNC: 330 MG/DL (ref 70–110)
GLUCOSE SERPL-MCNC: 473 MG/DL (ref 70–110)
GLUCOSE SERPL-MCNC: 490 MG/DL (ref 70–110)
HCO3 UR-SCNC: 27 MMOL/L (ref 24–28)
HCT VFR BLD AUTO: 32.2 % (ref 37–48.5)
HCT VFR BLD CALC: 28 %PCV (ref 36–54)
HGB BLD-MCNC: 10 G/DL
HGB BLD-MCNC: 10.5 G/DL (ref 12–16)
IMM GRANULOCYTES # BLD AUTO: 0.16 K/UL (ref 0–0.04)
IMM GRANULOCYTES NFR BLD AUTO: 0.7 % (ref 0–0.5)
LACTATE SERPL-SCNC: 0.9 MMOL/L (ref 0.5–2.2)
LACTATE SERPL-SCNC: 1 MMOL/L (ref 0.5–2.2)
LYMPHOCYTES # BLD AUTO: 1.9 K/UL (ref 1–4.8)
LYMPHOCYTES NFR BLD: 8.2 % (ref 18–48)
MAGNESIUM SERPL-MCNC: 2.1 MG/DL (ref 1.6–2.6)
MCH RBC QN AUTO: 28.2 PG (ref 27–31)
MCHC RBC AUTO-ENTMCNC: 32.6 G/DL (ref 32–36)
MCV RBC AUTO: 87 FL (ref 82–98)
MONOCYTES # BLD AUTO: 1.5 K/UL (ref 0.3–1)
MONOCYTES NFR BLD: 6.6 % (ref 4–15)
NEUTROPHILS # BLD AUTO: 19.5 K/UL (ref 1.8–7.7)
NEUTROPHILS NFR BLD: 84.4 % (ref 38–73)
NRBC BLD-RTO: 0 /100 WBC
PCO2 BLDA: 39.3 MMHG (ref 35–45)
PH SMN: 7.45 [PH] (ref 7.35–7.45)
PHOSPHATE SERPL-MCNC: 3.1 MG/DL (ref 2.7–4.5)
PLATELET # BLD AUTO: 269 K/UL (ref 150–450)
PMV BLD AUTO: 11.6 FL (ref 9.2–12.9)
PO2 BLDA: 34 MMHG (ref 40–60)
POC BE: 3 MMOL/L
POC SATURATED O2: 69 % (ref 95–100)
POC TCO2: 28 MMOL/L (ref 24–29)
POCT GLUCOSE: 183 MG/DL (ref 70–110)
POCT GLUCOSE: 245 MG/DL (ref 70–110)
POCT GLUCOSE: 389 MG/DL (ref 70–110)
POCT GLUCOSE: 449 MG/DL (ref 70–110)
POTASSIUM SERPL-SCNC: 3.6 MMOL/L (ref 3.5–5.1)
POTASSIUM SERPL-SCNC: 3.9 MMOL/L (ref 3.5–5.1)
POTASSIUM SERPL-SCNC: 4.6 MMOL/L (ref 3.5–5.1)
PROT SERPL-MCNC: 7 G/DL (ref 6–8.4)
RBC # BLD AUTO: 3.72 M/UL (ref 4–5.4)
SAMPLE: ABNORMAL
SITE: ABNORMAL
SODIUM SERPL-SCNC: 136 MMOL/L (ref 136–145)
SODIUM SERPL-SCNC: 138 MMOL/L (ref 136–145)
SODIUM SERPL-SCNC: 138 MMOL/L (ref 136–145)
WBC # BLD AUTO: 23.15 K/UL (ref 3.9–12.7)

## 2024-06-23 PROCEDURE — 25000242 PHARM REV CODE 250 ALT 637 W/ HCPCS: Performed by: PHYSICIAN ASSISTANT

## 2024-06-23 PROCEDURE — 94640 AIRWAY INHALATION TREATMENT: CPT

## 2024-06-23 PROCEDURE — 97166 OT EVAL MOD COMPLEX 45 MIN: CPT

## 2024-06-23 PROCEDURE — A4216 STERILE WATER/SALINE, 10 ML: HCPCS | Performed by: PHYSICIAN ASSISTANT

## 2024-06-23 PROCEDURE — 97530 THERAPEUTIC ACTIVITIES: CPT

## 2024-06-23 PROCEDURE — 80048 BASIC METABOLIC PNL TOTAL CA: CPT | Mod: 91,XB | Performed by: HOSPITALIST

## 2024-06-23 PROCEDURE — 94761 N-INVAS EAR/PLS OXIMETRY MLT: CPT

## 2024-06-23 PROCEDURE — 99900035 HC TECH TIME PER 15 MIN (STAT)

## 2024-06-23 PROCEDURE — 83605 ASSAY OF LACTIC ACID: CPT | Performed by: HOSPITALIST

## 2024-06-23 PROCEDURE — 25000003 PHARM REV CODE 250: Performed by: INTERNAL MEDICINE

## 2024-06-23 PROCEDURE — 25000003 PHARM REV CODE 250: Performed by: EMERGENCY MEDICINE

## 2024-06-23 PROCEDURE — 36415 COLL VENOUS BLD VENIPUNCTURE: CPT | Performed by: HOSPITALIST

## 2024-06-23 PROCEDURE — 83735 ASSAY OF MAGNESIUM: CPT | Performed by: STUDENT IN AN ORGANIZED HEALTH CARE EDUCATION/TRAINING PROGRAM

## 2024-06-23 PROCEDURE — 87040 BLOOD CULTURE FOR BACTERIA: CPT | Performed by: HOSPITALIST

## 2024-06-23 PROCEDURE — 85025 COMPLETE CBC W/AUTO DIFF WBC: CPT | Performed by: HOSPITALIST

## 2024-06-23 PROCEDURE — 63600175 PHARM REV CODE 636 W HCPCS: Performed by: STUDENT IN AN ORGANIZED HEALTH CARE EDUCATION/TRAINING PROGRAM

## 2024-06-23 PROCEDURE — 11000001 HC ACUTE MED/SURG PRIVATE ROOM

## 2024-06-23 PROCEDURE — 63600175 PHARM REV CODE 636 W HCPCS: Performed by: HOSPITALIST

## 2024-06-23 PROCEDURE — 99233 SBSQ HOSP IP/OBS HIGH 50: CPT | Mod: ,,, | Performed by: INTERNAL MEDICINE

## 2024-06-23 PROCEDURE — 80053 COMPREHEN METABOLIC PANEL: CPT | Performed by: STUDENT IN AN ORGANIZED HEALTH CARE EDUCATION/TRAINING PROGRAM

## 2024-06-23 PROCEDURE — 27000221 HC OXYGEN, UP TO 24 HOURS

## 2024-06-23 PROCEDURE — 25000003 PHARM REV CODE 250: Performed by: HOSPITALIST

## 2024-06-23 PROCEDURE — 97551 CAREGIVER TRAING EA ADDL 15: CPT

## 2024-06-23 PROCEDURE — 83880 ASSAY OF NATRIURETIC PEPTIDE: CPT | Performed by: HOSPITALIST

## 2024-06-23 PROCEDURE — 94660 CPAP INITIATION&MGMT: CPT

## 2024-06-23 PROCEDURE — 97161 PT EVAL LOW COMPLEX 20 MIN: CPT

## 2024-06-23 PROCEDURE — 84100 ASSAY OF PHOSPHORUS: CPT | Performed by: HOSPITALIST

## 2024-06-23 PROCEDURE — 97116 GAIT TRAINING THERAPY: CPT

## 2024-06-23 PROCEDURE — 25000003 PHARM REV CODE 250: Performed by: PHYSICIAN ASSISTANT

## 2024-06-23 RX ORDER — INSULIN GLARGINE 100 [IU]/ML
30 INJECTION, SOLUTION SUBCUTANEOUS 2 TIMES DAILY
Status: DISCONTINUED | OUTPATIENT
Start: 2024-06-23 | End: 2024-06-24

## 2024-06-23 RX ORDER — FUROSEMIDE 10 MG/ML
20 INJECTION INTRAMUSCULAR; INTRAVENOUS EVERY 12 HOURS
Status: DISCONTINUED | OUTPATIENT
Start: 2024-06-23 | End: 2024-06-24

## 2024-06-23 RX ORDER — INSULIN GLARGINE 100 [IU]/ML
20 INJECTION, SOLUTION SUBCUTANEOUS 2 TIMES DAILY
Status: DISCONTINUED | OUTPATIENT
Start: 2024-06-23 | End: 2024-06-23

## 2024-06-23 RX ORDER — INSULIN ASPART 100 [IU]/ML
14 INJECTION, SOLUTION INTRAVENOUS; SUBCUTANEOUS
Status: DISCONTINUED | OUTPATIENT
Start: 2024-06-23 | End: 2024-06-23

## 2024-06-23 RX ORDER — INSULIN GLARGINE 100 [IU]/ML
25 INJECTION, SOLUTION SUBCUTANEOUS 2 TIMES DAILY
Status: DISCONTINUED | OUTPATIENT
Start: 2024-06-23 | End: 2024-06-23

## 2024-06-23 RX ORDER — ONDANSETRON HYDROCHLORIDE 2 MG/ML
4 INJECTION, SOLUTION INTRAVENOUS EVERY 4 HOURS PRN
Status: DISCONTINUED | OUTPATIENT
Start: 2024-06-23 | End: 2024-06-26 | Stop reason: HOSPADM

## 2024-06-23 RX ORDER — SODIUM CHLORIDE, SODIUM LACTATE, POTASSIUM CHLORIDE, CALCIUM CHLORIDE 600; 310; 30; 20 MG/100ML; MG/100ML; MG/100ML; MG/100ML
INJECTION, SOLUTION INTRAVENOUS CONTINUOUS
Status: ACTIVE | OUTPATIENT
Start: 2024-06-23 | End: 2024-06-23

## 2024-06-23 RX ORDER — ONDANSETRON 4 MG/1
4 TABLET, ORALLY DISINTEGRATING ORAL EVERY 6 HOURS PRN
Status: DISCONTINUED | OUTPATIENT
Start: 2024-06-23 | End: 2024-06-23

## 2024-06-23 RX ORDER — INSULIN ASPART 100 [IU]/ML
0-10 INJECTION, SOLUTION INTRAVENOUS; SUBCUTANEOUS
Status: DISCONTINUED | OUTPATIENT
Start: 2024-06-23 | End: 2024-06-25

## 2024-06-23 RX ORDER — INSULIN ASPART 100 [IU]/ML
16 INJECTION, SOLUTION INTRAVENOUS; SUBCUTANEOUS
Status: DISCONTINUED | OUTPATIENT
Start: 2024-06-23 | End: 2024-06-24

## 2024-06-23 RX ORDER — INSULIN GLARGINE 100 [IU]/ML
10 INJECTION, SOLUTION SUBCUTANEOUS ONCE
Status: COMPLETED | OUTPATIENT
Start: 2024-06-23 | End: 2024-06-23

## 2024-06-23 RX ORDER — LORAZEPAM 2 MG/ML
0.5 INJECTION INTRAMUSCULAR ONCE
Status: COMPLETED | OUTPATIENT
Start: 2024-06-24 | End: 2024-06-23

## 2024-06-23 RX ORDER — ONDANSETRON HYDROCHLORIDE 2 MG/ML
4 INJECTION, SOLUTION INTRAVENOUS ONCE
Status: COMPLETED | OUTPATIENT
Start: 2024-06-23 | End: 2024-06-23

## 2024-06-23 RX ORDER — METOPROLOL SUCCINATE 50 MG/1
100 TABLET, EXTENDED RELEASE ORAL 2 TIMES DAILY
Status: DISCONTINUED | OUTPATIENT
Start: 2024-06-23 | End: 2024-06-26 | Stop reason: HOSPADM

## 2024-06-23 RX ADMIN — INSULIN GLARGINE 10 UNITS: 100 INJECTION, SOLUTION SUBCUTANEOUS at 11:06

## 2024-06-23 RX ADMIN — IPRATROPIUM BROMIDE AND ALBUTEROL SULFATE 3 ML: 2.5; .5 SOLUTION RESPIRATORY (INHALATION) at 07:06

## 2024-06-23 RX ADMIN — SODIUM CHLORIDE, POTASSIUM CHLORIDE, SODIUM LACTATE AND CALCIUM CHLORIDE: 600; 310; 30; 20 INJECTION, SOLUTION INTRAVENOUS at 06:06

## 2024-06-23 RX ADMIN — ONDANSETRON 4 MG: 2 INJECTION INTRAMUSCULAR; INTRAVENOUS at 09:06

## 2024-06-23 RX ADMIN — FLUTICASONE PROPIONATE 50 MCG: 50 SPRAY, METERED NASAL at 09:06

## 2024-06-23 RX ADMIN — IPRATROPIUM BROMIDE AND ALBUTEROL SULFATE 3 ML: 2.5; .5 SOLUTION RESPIRATORY (INHALATION) at 11:06

## 2024-06-23 RX ADMIN — ALLOPURINOL 300 MG: 300 TABLET ORAL at 09:06

## 2024-06-23 RX ADMIN — CLONIDINE HYDROCHLORIDE 0.1 MG: 0.1 TABLET ORAL at 08:06

## 2024-06-23 RX ADMIN — IPRATROPIUM BROMIDE AND ALBUTEROL SULFATE 3 ML: 2.5; .5 SOLUTION RESPIRATORY (INHALATION) at 12:06

## 2024-06-23 RX ADMIN — MICONAZOLE NITRATE: 20 OINTMENT TOPICAL at 09:06

## 2024-06-23 RX ADMIN — INSULIN ASPART 10 UNITS: 100 INJECTION, SOLUTION INTRAVENOUS; SUBCUTANEOUS at 11:06

## 2024-06-23 RX ADMIN — IPRATROPIUM BROMIDE AND ALBUTEROL SULFATE 3 ML: 2.5; .5 SOLUTION RESPIRATORY (INHALATION) at 03:06

## 2024-06-23 RX ADMIN — FUROSEMIDE 20 MG: 10 INJECTION, SOLUTION INTRAMUSCULAR; INTRAVENOUS at 08:06

## 2024-06-23 RX ADMIN — METOPROLOL SUCCINATE 100 MG: 50 TABLET, EXTENDED RELEASE ORAL at 09:06

## 2024-06-23 RX ADMIN — MICONAZOLE NITRATE: 20 OINTMENT TOPICAL at 08:06

## 2024-06-23 RX ADMIN — CLONIDINE HYDROCHLORIDE 0.1 MG: 0.1 TABLET ORAL at 09:06

## 2024-06-23 RX ADMIN — METOCLOPRAMIDE 5 MG: 5 TABLET ORAL at 05:06

## 2024-06-23 RX ADMIN — Medication 10 ML: at 02:06

## 2024-06-23 RX ADMIN — METHOCARBAMOL 500 MG: 500 TABLET ORAL at 08:06

## 2024-06-23 RX ADMIN — METOCLOPRAMIDE 5 MG: 5 TABLET ORAL at 04:06

## 2024-06-23 RX ADMIN — HEPARIN SODIUM 5000 UNITS: 5000 INJECTION INTRAVENOUS; SUBCUTANEOUS at 11:06

## 2024-06-23 RX ADMIN — METOCLOPRAMIDE 5 MG: 5 TABLET ORAL at 08:06

## 2024-06-23 RX ADMIN — INSULIN ASPART 14 UNITS: 100 INJECTION, SOLUTION INTRAVENOUS; SUBCUTANEOUS at 10:06

## 2024-06-23 RX ADMIN — METOCLOPRAMIDE 5 MG: 5 TABLET ORAL at 10:06

## 2024-06-23 RX ADMIN — ASPIRIN 81 MG: 81 TABLET, COATED ORAL at 09:06

## 2024-06-23 RX ADMIN — NIFEDIPINE 30 MG: 30 TABLET, FILM COATED, EXTENDED RELEASE ORAL at 09:06

## 2024-06-23 RX ADMIN — INSULIN ASPART 14 UNITS: 100 INJECTION, SOLUTION INTRAVENOUS; SUBCUTANEOUS at 07:06

## 2024-06-23 RX ADMIN — ONDANSETRON 4 MG: 4 TABLET, ORALLY DISINTEGRATING ORAL at 08:06

## 2024-06-23 RX ADMIN — LORAZEPAM 0.5 MG: 2 INJECTION INTRAMUSCULAR; INTRAVENOUS at 11:06

## 2024-06-23 RX ADMIN — HYDROCODONE BITARTRATE AND ACETAMINOPHEN 1 TABLET: 10; 325 TABLET ORAL at 04:06

## 2024-06-23 RX ADMIN — Medication 10 ML: at 10:06

## 2024-06-23 RX ADMIN — NIFEDIPINE 30 MG: 30 TABLET, FILM COATED, EXTENDED RELEASE ORAL at 08:06

## 2024-06-23 RX ADMIN — Medication 6 MG: at 08:06

## 2024-06-23 RX ADMIN — INSULIN GLARGINE 30 UNITS: 100 INJECTION, SOLUTION SUBCUTANEOUS at 08:06

## 2024-06-23 RX ADMIN — SERTRALINE HYDROCHLORIDE 100 MG: 50 TABLET ORAL at 09:06

## 2024-06-23 RX ADMIN — Medication 10 ML: at 06:06

## 2024-06-23 RX ADMIN — INSULIN ASPART 4 UNITS: 100 INJECTION, SOLUTION INTRAVENOUS; SUBCUTANEOUS at 04:06

## 2024-06-23 RX ADMIN — MUPIROCIN: 20 OINTMENT TOPICAL at 09:06

## 2024-06-23 RX ADMIN — MUPIROCIN: 20 OINTMENT TOPICAL at 08:06

## 2024-06-23 RX ADMIN — LIDOCAINE 5% 2 PATCH: 700 PATCH TOPICAL at 09:06

## 2024-06-23 RX ADMIN — METOPROLOL SUCCINATE 100 MG: 50 TABLET, EXTENDED RELEASE ORAL at 08:06

## 2024-06-23 RX ADMIN — HEPARIN SODIUM 5000 UNITS: 5000 INJECTION INTRAVENOUS; SUBCUTANEOUS at 08:06

## 2024-06-23 RX ADMIN — HEPARIN SODIUM 5000 UNITS: 5000 INJECTION INTRAVENOUS; SUBCUTANEOUS at 03:06

## 2024-06-23 RX ADMIN — IPRATROPIUM BROMIDE AND ALBUTEROL SULFATE 3 ML: 2.5; .5 SOLUTION RESPIRATORY (INHALATION) at 04:06

## 2024-06-23 RX ADMIN — INSULIN GLARGINE 20 UNITS: 100 INJECTION, SOLUTION SUBCUTANEOUS at 06:06

## 2024-06-23 RX ADMIN — INSULIN ASPART 16 UNITS: 100 INJECTION, SOLUTION INTRAVENOUS; SUBCUTANEOUS at 04:06

## 2024-06-23 NOTE — PT/OT/SLP PROGRESS
Occupational Therapy      Patient Name:  Indira Waters   MRN:  [id number]    Patient not seen today secondary to Pt. soon transferring to room 387 per RN. Will follow-up once on [facility].    6/23/2024

## 2024-06-23 NOTE — ASSESSMENT & PLAN NOTE
-On admit appeared euvolemic.  BNP not significantly elevated and CXR without evidence of pulmonary edema  -Overnight 6/20-6/21 developed respiratory distress associated with elevated blood pressure likely secondary to pain.  Repeat CXR showed pulmonary edema (not present on initial cxr).  -Echo reviewed as above - notable for mildly depressed EF (previously normal) and normal diastolic function  -This is acute on chronic combined systolic and diastolic chf  -Cardiology consulted and input appreciated    -Strict ins/outs and daily weights  -Noted slight bump in creatinine - holding losartan for now  -Have diuresed with iv lasix 40bid over last 24 hours and her respiratory status is improving, but again with recurrence of high aniongap and hyperglycemia this morning.  Will hold morning dose of lasix for now and give gentle fluids in effort to stave off worsening DKA and insulin drip resumption.  Likely resume lasix this afternoon - ?lower dose? (at home was on torsemide 20mg qd)  -Continue blood pressure control with clonidine, toprol and nifedipine.

## 2024-06-23 NOTE — PLAN OF CARE
Problem: Adult Inpatient Plan of Care  Goal: Plan of Care Review  Outcome: Progressing  Goal: Patient-Specific Goal (Individualized)  Outcome: Progressing  Goal: Absence of Hospital-Acquired Illness or Injury  Outcome: Progressing  Goal: Optimal Comfort and Wellbeing  Outcome: Progressing  Goal: Readiness for Transition of Care  Outcome: Progressing     Problem: COPD (Chronic Obstructive Pulmonary Disease)  Goal: Optimal Chronic Illness Coping  Outcome: Progressing  Goal: Optimal Level of Functional Jenkins  Outcome: Progressing  Goal: Absence of Infection Signs and Symptoms  Outcome: Progressing  Goal: Improved Oral Intake  Outcome: Progressing  Goal: Effective Oxygenation and Ventilation  Outcome: Progressing     Problem: Fall Injury Risk  Goal: Absence of Fall and Fall-Related Injury  Outcome: Progressing     Problem: Diabetes Comorbidity  Goal: Blood Glucose Level Within Targeted Range  Outcome: Progressing     Problem: Wound  Goal: Optimal Coping  Outcome: Progressing  Goal: Optimal Functional Ability  Outcome: Progressing  Goal: Absence of Infection Signs and Symptoms  Outcome: Progressing  Goal: Improved Oral Intake  Outcome: Progressing  Goal: Optimal Pain Control and Function  Outcome: Progressing  Goal: Skin Health and Integrity  Outcome: Progressing  Goal: Optimal Wound Healing  Outcome: Progressing     Problem: Skin Injury Risk Increased  Goal: Skin Health and Integrity  Outcome: Progressing     Problem: Diabetic Ketoacidosis  Goal: Optimal Coping  Outcome: Progressing  Goal: Fluid and Electrolyte Balance with Absence of Ketosis  Outcome: Progressing     Problem: Infection  Goal: Absence of Infection Signs and Symptoms  Outcome: Progressing

## 2024-06-23 NOTE — ASSESSMENT & PLAN NOTE
-Bp normal initially but spiked overnight 6/20-6/21 to 204/77 at [facility]   -BP today normal to mildly elevated  -Continue clonidine, toprol and nifedipine  -Holding losartan for now given bump in creatinine  -Prn hdyralazine available for SBP > 180

## 2024-06-23 NOTE — ASSESSMENT & PLAN NOTE
-A1c 9.5 6/5/24  -Home regimen includes aspart 14 units tidwm and detemir 20 units qd +18 units qhs  -On admit hyperglycemic with blood sugar 325, but not in DKA  -Treated with IV steroids in ED and developed DKA on 1st night (Blood sugar 901, anion gap 23, BOHB 3.8)  -DKA pathway initiated and she was treated with insulin drip, limited fluids due to pulmonary edema and we monitored accu check q1h and bmp q4h.  Anion gap closed and she was been transitioned to subcutaneous insulin 6/22.  -Today blood sugar again markedly elevated with anion-gap acidosis.  Have made her NPO, given her long and short acting insulin early this morning, held morning lasix and started gentle IV fluids.  Repeat BMP in 4 hours.  For now continue with detemir 25 units bid + aspart 14 units tidwm, but may need to resume insulin drip pending repeat BMP.

## 2024-06-23 NOTE — PLAN OF CARE
Problem: Occupational Therapy  Goal: Occupational Therapy Goal  Description: Goals to be met by: 7/7/2024     Patient will increase functional independence with ADLs by performing:    UE Dressing with Supervision.  LE Dressing with Stand-by Assistance and Assistive Devices as needed.  Grooming while standing at sink with Stand-by Assistance.  Toileting from toilet with Supervision for hygiene and clothing management.   Toilet transfer to toilet with Supervision.    Outcome: Progressing     Initial OT eval/treat complete.  Has SPC and shower stool; also has long handled sponge for lower body bathing.  Anticipate need for RW though will defer AD to PT.  Needs TTB.  Anticipate need for post acute Moderate Intensity therapy.  Previously living with daughter that assists Pt. With tub transfers, lower body dressing, and iADL.  Pt. Has 1 fligh of stairs to reach her 2nd [place] apt.  To benefit from continued acute care OT services to increase independence in self-care/functional transfers.  OT to follow.

## 2024-06-23 NOTE — ASSESSMENT & PLAN NOTE
Body mass index is 39.05 kg/m². Morbid obesity complicates all aspects of disease management from diagnostic modalities to treatment. Weight loss encouraged and health benefits explained to patient.

## 2024-06-23 NOTE — PLAN OF CARE
Problem: Physical Therapy  Goal: Physical Therapy Goal  Description: Goals to be met by: 2024    Patient will increase functional independence with mobility by performin. Sit<>stand with Min A with RW.  2. Gait x 40 feet with RW with CGA.  3. Supine<>sit with CGA.      Outcome: Progressing   Orders received and Physical Therapy evaluation completed.    Pt required mod assist to transfer supine to sit.  She also required Mod assist to transfer sit to stand with a RW.  She was able to ambulate 4 ft with a RW and Min assist .    Rec: Moderate Intensity Therapy  DME: TBD at next level of care

## 2024-06-23 NOTE — ASSESSMENT & PLAN NOTE
-Baseline Cr 1.2-1.5  -On admit Cr 1.9 and has had mild hyperkalemia and pseudohyponatremia due to hyperglycemia  -Cr bumped to 2.2 on 6/21 but is improved to 1.6 today.  -WILLIAM without any evidence of hydronephrosis  -No urine eosinophils.  No significant proteinuria  -Avoid nephrotoxic agents and renally dose meds  -Hold home torsemide and losartan for now  -Have been diuresing with iv lasix 40mg q12 but has recurrence of acidosis and hyperglycemia.  Hold this mornings dose and give gentle IV fluids x 4 hours.  Still needs further diuresis once metabolic status is improved.  Likely resume lasix later today.  Monitor BMP closely.  -Repeat BMP q4h

## 2024-06-23 NOTE — PLAN OF CARE
Pulmonary & Critical Care Medicine Plan of Care Note    Indira Waters is a 79 y.o.female with h/o reported chronic bronchitis (no PFTs), CHF, T2DM, osteoarthritis, CKD3 who presented with SOB chronically worsening over 1 week then had acute inc in BP and likely flash pulmonary edema requiring BIPAP and steroid induced DKA, now resolved. Still with significant hyperglycemia today.     - Increase Lantus to 30u BID   - Give another 1x dose Lantus 10u   - Continue scheduled short acting with meals in addition to increasing sliding scale to moderate dose   - Pt looks great on exam, aggressive blood sugar control       Karissa Qureshi MD  [facility]  06/23/2024 12:09 PM

## 2024-06-23 NOTE — PROGRESS NOTES
[facility] CARDIOLOGY    Admission date:  6/20/2024     Assessment    Acute on chronic combined systolic and diastolic heart failure   She is receiving intravenous fluids     Coronary atherosclerosis   No angina    Hypertension  Blood pressures are running high    Plan and Discussion    Losartan was stopped yesterday because of concerns about her renal function.  Will defer as to when to restart.  Will increase metoprolol.    Subjective    Feels well.  Had a good night.    Medications  Current Facility-Administered Medications   Medication Dose Route Frequency Provider Last Rate Last Admin    acetaminophen tablet 650 mg  650 mg Oral Q6H PRN Marifer Boudreaux PA-C   650 mg at 06/21/24 0043    albuterol-ipratropium 2.5 mg-0.5 mg/3 mL nebulizer solution 3 mL  3 mL Nebulization Q4H PRN Marifer Boudreaux PA-C        albuterol-ipratropium 2.5 mg-0.5 mg/3 mL nebulizer solution 3 mL  3 mL Nebulization Q4H Marifer Boudreaux PA-C   3 mL at 06/23/24 0757    allopurinoL tablet 300 mg  300 mg Oral Daily Marifer Boudreaux PA-C   300 mg at 06/23/24 0944    aspirin EC tablet 81 mg  81 mg Oral Daily Marifer Boudreaux PA-C   81 mg at 06/23/24 0943    cloNIDine tablet 0.1 mg  0.1 mg Oral BID Marifer Boudreaux PA-C   0.1 mg at 06/23/24 0944    dextrose 10% bolus 125 mL 125 mL  12.5 g Intravenous PRN Rey Lieberman MD        dextrose 10% bolus 125 mL 125 mL  12.5 g Intravenous PRN Rey Lieberman MD        dextrose 10% bolus 125 mL 125 mL  12.5 g Intravenous PRN Rey Lieberman MD        dextrose 10% bolus 250 mL 250 mL  25 g Intravenous PRN Rey Lieberman MD        dextrose 10% bolus 250 mL 250 mL  25 g Intravenous PRN Rey Lieberman MD        dextrose 10% bolus 250 mL 250 mL  25 g Intravenous PRN Rey Lieberman MD        dextrose 5 % and 0.45 % NaCl infusion  125 mL/hr Intravenous Continuous PRN Rey Lieberman MD        fluticasone propionate 50 mcg/actuation nasal spray 50 mcg  1 spray Each  Nostril Daily Marifer Boudreaux PA-C   50 mcg at 06/23/24 0945    glucagon (human recombinant) injection 1 mg  1 mg Intramuscular PRN Mio, Rey BURDICK MD        glucose chewable tablet 16 g  16 g Oral PRN Mio, Rey BURDICK MD        glucose chewable tablet 24 g  24 g Oral PRN Mio, Rey BURDICK MD        heparin (porcine) injection 5,000 Units  5,000 Units Subcutaneous Q8H Marker, Rey BURDICK MD   5,000 Units at 06/23/24 0342    hydrALAZINE injection 15 mg  15 mg Intravenous Q4H PRN Mio, Rey BURDICK MD        HYDROcodone-acetaminophen  mg per tablet 1 tablet  1 tablet Oral Q8H PRN Marifer Boudreaux PA-C   1 tablet at 06/23/24 0442    insulin aspart U-100 pen 0-5 Units  0-5 Units Subcutaneous QID (AC + HS) PRN Mio, Rey BURDICK MD   3 Units at 06/22/24 2007    insulin aspart U-100 pen 14 Units  14 Units Subcutaneous TIDWM Marker, Rey BURDICK MD   14 Units at 06/23/24 0703    insulin glargine U-100 (Lantus) pen 20 Units  20 Units Subcutaneous BID Mio, Rey BURDICK MD   20 Units at 06/23/24 0659    lactated ringers infusion   Intravenous Continuous Marker, Rey BURDICK MD 75 mL/hr at 06/23/24 0656 New Bag at 06/23/24 0656    LIDOcaine 5 % patch 2 patch  2 patch Transdermal Daily Mio, Rey BURDICK MD   2 patch at 06/23/24 0942    melatonin tablet 6 mg  6 mg Oral Nightly PRN Jazzmine Martinez MD        methocarbamoL tablet 500 mg  500 mg Oral TID PRN Marifer Boudreaux PA-C   500 mg at 06/22/24 1807    metoclopramide HCl tablet 5 mg  5 mg Oral QID (AC & HS) Marifer Boudreaux PA-C   5 mg at 06/23/24 0547    metoprolol succinate (TOPROL-XL) 24 hr tablet 100 mg  100 mg Oral Daily Marifer Boudreaux PA-C   100 mg at 06/23/24 0944    miconazole nitrate 2% ointment   Topical (Top) BID Marker, Rey BURDICK MD   Given at 06/23/24 0952    mupirocin 2 % ointment   Nasal BID Mio, Rey BURDICK MD   Given at 06/23/24 0945    naloxone 0.4 mg/mL injection 0.02 mg  0.02 mg Intravenous PRN Marifer Boudreaux, PAHanyC        NIFEdipine  24 hr tablet 30 mg  30 mg Oral BID Marifer Boudreaux PA-C   30 mg at 06/23/24 0944    ondansetron injection 4 mg  4 mg Intravenous Q4H PRN Rey Lieberman MD        senna-docusate 8.6-50 mg per tablet 1 tablet  1 tablet Oral BID PRN Marifer Boudreaux PA-C        sertraline tablet 100 mg  100 mg Oral Daily Marifer Boudreaux PA-C   100 mg at 06/23/24 0943    sodium chloride 0.9% flush 10 mL  10 mL Intravenous PRN Jazzmine Martinez MD        sodium chloride 0.9% flush 10 mL  10 mL Intravenous Q8H Marifer Boudreaux PA-C   10 mL at 06/23/24 0600    sodium chloride 0.9% flush 10 mL  10 mL Intravenous PRN Rey Lieberman MD        sodium chloride 0.9% flush 3 mL  3 mL Intravenous PRN Marifer Boudreaux PA-C            Physical Exam    Temp:  [98.6 °F (37 °C)-99.6 °F (37.6 °C)]   Pulse:  [84-94]   Resp:  [16-27]   BP: (121-153)/(58-68)   SpO2:  [91 %-95 %]    Wt Readings from Last 3 Encounters:   06/23/24 90.7 kg (199 lb 15.3 oz)   05/22/24 89.8 kg (198 lb)   05/15/24 89.9 kg (198 lb 3.2 oz)     Physical Exam  Constitutional:       General: She is not in acute distress.     Interventions: Nasal cannula in place.   Neck:      Vascular: No hepatojugular reflux or JVD.   Cardiovascular:      Rate and Rhythm: Normal rate and regular rhythm.      Heart sounds: S1 normal and S2 normal. Murmur heard.      Systolic murmur is present.      Gallop present. S4 sounds present. No S3 sounds.   Pulmonary:      Effort: Pulmonary effort is normal.      Breath sounds: Normal air entry. Rhonchi present.   Abdominal:      General: Bowel sounds are normal.      Palpations: Abdomen is soft. There is no hepatomegaly.      Tenderness: There is no abdominal tenderness.   Musculoskeletal:      Right lower leg: Edema present.      Left lower leg: Edema present.   Skin:     Coloration: Skin is not pale.   Neurological:      Mental Status: She is alert.   Psychiatric:         Behavior: Behavior is cooperative.          Telemetry  Sinus rhythm    Labs  Recent Results (from the past 24 hour(s))   POCT glucose    Collection Time: 06/22/24 12:15 PM   Result Value Ref Range    POCT Glucose 389 (H) 70 - 110 mg/dL   POCT glucose    Collection Time: 06/22/24  5:45 PM   Result Value Ref Range    POCT Glucose 407 (H) 70 - 110 mg/dL   POCT glucose    Collection Time: 06/22/24  8:06 PM   Result Value Ref Range    POCT Glucose 375 (H) 70 - 110 mg/dL   Phosphorus    Collection Time: 06/23/24  5:12 AM   Result Value Ref Range    Phosphorus 3.1 2.7 - 4.5 mg/dL   Comprehensive metabolic panel    Collection Time: 06/23/24  5:12 AM   Result Value Ref Range    Sodium 138 136 - 145 mmol/L    Potassium 4.6 3.5 - 5.1 mmol/L    Chloride 97 95 - 110 mmol/L    CO2 19 (L) 23 - 29 mmol/L    Glucose 490 (HH) 70 - 110 mg/dL    BUN 37 (H) 8 - 23 mg/dL    Creatinine 1.6 (H) 0.5 - 1.4 mg/dL    Calcium 9.1 8.7 - 10.5 mg/dL    Total Protein 7.0 6.0 - 8.4 g/dL    Albumin 3.0 (L) 3.5 - 5.2 g/dL    Total Bilirubin 0.3 0.1 - 1.0 mg/dL    Alkaline Phosphatase 59 55 - 135 U/L    AST 26 10 - 40 U/L    ALT 14 10 - 44 U/L    eGFR 33 (A) >60 mL/min/1.73 m^2    Anion Gap 22 (H) 8 - 16 mmol/L   Magnesium    Collection Time: 06/23/24  5:12 AM   Result Value Ref Range    Magnesium 2.1 1.6 - 2.6 mg/dL   CBC auto differential    Collection Time: 06/23/24  5:56 AM   Result Value Ref Range    WBC 23.15 (H) 3.90 - 12.70 K/uL    RBC 3.72 (L) 4.00 - 5.40 M/uL    Hemoglobin 10.5 (L) 12.0 - 16.0 g/dL    Hematocrit 32.2 (L) 37.0 - 48.5 %    MCV 87 82 - 98 fL    MCH 28.2 27.0 - 31.0 pg    MCHC 32.6 32.0 - 36.0 g/dL    RDW 16.0 (H) 11.5 - 14.5 %    Platelets 269 150 - 450 K/uL    MPV 11.6 9.2 - 12.9 fL    Immature Granulocytes 0.7 (H) 0.0 - 0.5 %    Gran # (ANC) 19.5 (H) 1.8 - 7.7 K/uL    Immature Grans (Abs) 0.16 (H) 0.00 - 0.04 K/uL    Lymph # 1.9 1.0 - 4.8 K/uL    Mono # 1.5 (H) 0.3 - 1.0 K/uL    Eos # 0.0 0.0 - 0.5 K/uL    Baso # 0.03 0.00 - 0.20 K/uL    nRBC 0 0 /100 WBC     Gran % 84.4 (H) 38.0 - 73.0 %    Lymph % 8.2 (L) 18.0 - 48.0 %    Mono % 6.6 4.0 - 15.0 %    Eosinophil % 0.0 0.0 - 8.0 %    Basophil % 0.1 0.0 - 1.9 %    Differential Method Automated    BNP    Collection Time: 06/23/24  5:56 AM   Result Value Ref Range     (H) 0 - 99 pg/mL   Lactic Acid, Plasma    Collection Time: 06/23/24  7:21 AM   Result Value Ref Range    Lactate (Lactic Acid) 0.9 0.5 - 2.2 mmol/L   POCT glucose    Collection Time: 06/23/24  9:09 AM   Result Value Ref Range    POCT Glucose 389 (H) 70 - 110 mg/dL   ISTAT PROCEDURE    Collection Time: 06/23/24  9:35 AM   Result Value Ref Range    POC PH 7.445 7.35 - 7.45    POC PCO2 39.3 35 - 45 mmHg    POC PO2 34 (LL) 40 - 60 mmHg    POC HCO3 27.0 24 - 28 mmol/L    POC BE 3 (H) -2 to 2 mmol/L    POC SATURATED O2 69 95 - 100 %    POC TCO2 28 24 - 29 mmol/L    POC Hematocrit 28 (L) 36 - 54 %PCV    POC HEMOGLOBIN 10 g/dL    Sample VENOUS     Site Other     Allens Test N/A              Jose L Méndez MD

## 2024-06-23 NOTE — PT/OT/SLP EVAL
"Physical Therapy Evaluation    Patient Name:  Indira Waters   MRN:  [id number]    Recommendations:     Discharge Recommendations: Moderate Intensity Therapy   Discharge Equipment Recommendations: to be determined by next level of care   Barriers to discharge: Inaccessible home and Decreased caregiver support    Assessment:     Indira Waters is a 79 y.o. female admitted with a medical diagnosis of Acute respiratory failure with hypoxia and hypercapnia.  She presents with the following impairments/functional limitations: weakness, impaired endurance, impaired sensation, impaired self care skills, impaired functional mobility, gait instability, impaired balance, impaired cognition, decreased coordination, decreased lower extremity function, decreased safety awareness, pain, impaired coordination, edema, impaired cardiopulmonary response to activity. Pt required mod assist to transfer supine to sit.  She also required Mod assist to transfer sit to stand with a RW.  She was able to ambulate 4 ft with a RW and Min assist .    Rec: Moderate Intensity Therapy  DME: TBD at next level of care.    Rehab Prognosis: Good; patient would benefit from acute skilled PT services to address these deficits and reach maximum level of function.    Recent Surgery: * No surgery found *      Plan:     During this hospitalization, patient to be seen 5 x/week to address the identified rehab impairments via gait training, therapeutic activities, therapeutic exercises, neuromuscular re-education and progress toward the following goals:    Plan of Care Expires:  07/23/24    Subjective     Chief Complaint: pain "all over"  Patient/Family Comments/goals: Pt agreeable to evaluation  Pain/Comfort:  Pain Rating 1: 10/10  Location 1: other (see comments) ("All over")  Pain Addressed 1: Reposition, Distraction, Cessation of Activity, Nurse notified  Pain Rating Post-Intervention 1: 10/10    Patients cultural, spiritual, Orthodoxy conflicts " given the current situation: no    Living Environment:  Pt lives in a 2nd floor Apt with her daughter with 15 steps to enter  Prior to admission, patients level of function was In need of assistance.  Equipment used at home: cane, straight, shower chair.  DME owned (not currently used): none.  Upon discharge, patient will have assistance from her daughter.    Objective:     Communicated with Nurse prior to session.  Patient found HOB elevated with blood pressure cuff, oxygen, PureWick, peripheral IV, pulse ox (continuous), telemetry  upon PT entry to room.    General Precautions: Standard, fall, diabetic, NPO  Orthopedic Precautions:N/A   Braces: N/A  Respiratory Status: Nasal cannula, flow 4 L/min    Exams:  Cognitive Exam:  Patient is oriented to Pt knew her and her daughter's names and the year.  She knew her birth day and month but not the correct year.  Sensation:    -       Impaired  Pt reports B foot sensitivity secondary to neuropathy  RLE ROM: WFL  RLE Strength: Deficits: Hip[ flexion 2/5, knee flexion and extension 3/5  LLE ROM: WFL  LLE Strength: Deficits: Hip flexion 2/5, Knee flexion and extension 3/5    Functional Mobility:  Bed Mobility:     Supine to Sit: moderate assistance  Sit to Supine: moderate assistance  Transfers:     Sit to Stand:  moderate assistance with rolling walker  Gait: Pt was able to ambulate 4 ft with a RW and Min assist      AM-PAC 6 CLICK MOBILITY  Total Score:17       Treatment & Education:  Role of Physical Therapy  Plan of Care  Physical Therapy evaluation  Transfer and gait training with a RW  Instruction in heel slides in bed  Patient left HOB elevated with all lines intact, call button in reach, and Nurse notified.    GOALS:   Multidisciplinary Problems       Physical Therapy Goals          Problem: Physical Therapy    Goal Priority Disciplines Outcome Goal Variances Interventions   Physical Therapy Goal     PT, PT/OT Progressing     Description: Goals to be met by:  2024    Patient will increase functional independence with mobility by performin. Sit<>stand with Min A with RW.  2. Gait x 40 feet with RW with CGA.  3. Supine<>sit with CGA.                           History:     Past Medical History:   Diagnosis Date    Arthritis     Back pain     Cancer     ovarian    Cervical cancer     Coronary artery disease     Depression     Diabetes mellitus     Fibromyalgia     Heart attack     History of MI (myocardial infarction)     Hyperlipidemia     Hypertension     Lupus     Stroke     slight left sided weakness       Past Surgical History:   Procedure Laterality Date    APPENDECTOMY       SECTION      2    CORONARY ANGIOGRAPHY N/A 2022    Procedure: ANGIOGRAM, CORONARY ARTERY;  Surgeon: Jose L Méndez MD;  Location: [facility] CATH LAB;  Service: Cardiology;  Laterality: N/A;    HYSTERECTOMY      with USO for cervical cancer    INJECTION OF ANESTHETIC AGENT AROUND NERVE Bilateral 2021    Procedure: BLOCK, NERVE, SYMPATHIC;  Surgeon: Holden Pereira MD;  Location: [facility] PAIN MGT;  Service: Pain Management;  Laterality: Bilateral;    INJECTION OF ANESTHETIC AGENT AROUND NERVE N/A 2021    Procedure: BLOCK, NERVE, SYMPATHETIC  need consent;  Surgeon: Holden Pereira MD;  Location: [facility] PAIN MGT;  Service: Pain Management;  Laterality: N/A;    RI EVAL,SWALLOW FUNCTION,CINE/VIDEO RECORD  2021         TONSILLECTOMY      TRIAL OF SPINAL CORD NERVE STIMULATOR N/A 3/8/2023    Procedure: LUMBAR SPINAL CORD STIMULATOR TRIAL NEVRO REP PATIENT STATES SHE NO LONGER TAKES PLAVIX;  Surgeon: Holden Pereira MD;  Location: [facility] PAIN MGT;  Service: Pain Management;  Laterality: N/A;       Time Tracking:     PT Received On: 24  PT Start Time: 1544     PT Stop Time: 1623  PT Total Time (min): 39 min     Billable Minutes: Evaluation 13, Gait Training 13, and Therapeutic Activity 13      2024

## 2024-06-23 NOTE — PROGRESS NOTES
[facility] Intensive Care [facility] Medicine  Progress Note    Patient Name: Indira Waters  MRN: [id number]  Patient Class: IP- Inpatient   Admission Date: 6/20/2024  Length of Stay: 2 days  Attending Physician: Rey Lieberman MD  Primary Care Provider: Chun Zapata MD        Subjective:     Principal Problem:Acute respiratory failure with hypoxia and hypercapnia        HPI:  Ms. Indira Waters is a 79 y.o. female, with PMH of Chronic pain, Chronic airflow limitation, chronic bronchitis, former smoker, CAD, HTN, T2DM, Diabetic polyneuropathy, RA, GERD, MDD, anxiety, fibromyalgia, h/o cervical cancer, obesity, CDK-4, SLE, who presented to [facility] ED on 6/20/24 from her PCPs office due to shortness of breath that occurs when it rains outside with associated hypoxia in the office. She states she has felt short of breath all week, and that at baseline she has FERRIS, which is now also occurring at rest and is associated with chest tightness. She further notes onset of lower extremity swelling. She denied fever, chills, cough. She does not utilize home O2. ED workup with labs including a CBC which shows anemia with H&H of 10.4/32.0, but no leukocytosis or left shift. A metabolic panel showed RANJEET with BUN 33, and Cr of 1.9 without hyperkalemia. CO2 was 22, and anion gap was 16, glucose was 382. BNP was 132, without elevation of troponin, and with CTX showing no acute cardiopulmonary process. A D-dimer was not elevated. She was treated in the ED with 3 DuoNebs, one hour long neb, and IV steroids. Initially she was maintaining adequate O2 sats on room air. At about midnight her BP jerri to 204/77, when she was apparently reporting pain, and then at about 01:40 she had an acute decline in O2 sats with worsened shortness of breath. An ABG was ordered and showed CO2 retention, she was placed on BiPap. Orders were changed from tele to ICU. She was placed on observation.     Overview/Hospital Course:  No  notes on file    Interval History: No acute events overnight.  Today states she is feeling OK and denies shortness of breath.  All questions answered and patient had no further complaints.    Objective:     Vital Signs (Most Recent):  Temp: 98.8 °F (37.1 °C) (06/23/24 0705)  Pulse: 89 (06/23/24 1000)  Resp: (!) 24 (06/23/24 1000)  BP: 136/63 (06/23/24 1000)  SpO2: (!) 93 % (06/23/24 1000) Vital Signs (24h Range):  Temp:  [98.6 °F (37 °C)-99.6 °F (37.6 °C)] 98.8 °F (37.1 °C)  Pulse:  [84-94] 89  Resp:  [16-27] 24  SpO2:  [91 %-95 %] 93 %  BP: (121-153)/(58-68) 136/63     Weight: 90.7 kg (199 lb 15.3 oz)  Body mass index is 39.05 kg/m².    Intake/Output Summary (Last 24 hours) at 6/23/2024 1054  Last data filed at 6/23/2024 0529  Gross per 24 hour   Intake 885.26 ml   Output 2700 ml   Net -1814.74 ml         Physical Exam  Vitals and nursing note reviewed.   Constitutional:       General: She is not in acute distress.     Appearance: She is well-developed. She is obese. She is ill-appearing (chronically). She is not toxic-appearing or diaphoretic.   HENT:      Head: Normocephalic and atraumatic.      Mouth/Throat:      Mouth: Mucous membranes are moist.   Eyes:      Extraocular Movements: Extraocular movements intact.   Neck:      Trachea: No tracheal deviation.   Cardiovascular:      Rate and Rhythm: Normal rate and regular rhythm.      Heart sounds: Normal heart sounds. No murmur heard.     No gallop.      Comments: Trace LLE edema.     Pulmonary:      Comments: Breathing comfortably on bipap, overall good air movement without wheezing, but with diffuse loud course rales  Abdominal:      General: Bowel sounds are normal. There is no distension.      Palpations: Abdomen is soft. There is no mass.      Tenderness: There is no abdominal tenderness. There is no guarding.   Musculoskeletal:         General: Swelling present. Tenderness: left knee.     Cervical back: Normal range of motion.      Left lower leg: Edema  present.      Comments: Lets are mildly edematous.  Left knee without myles swelling, warmth or redness and is not tender to superficial palpation   Skin:     General: Skin is warm and dry.      Coloration: Skin is not pale.      Findings: No erythema or rash.   Neurological:      General: No focal deficit present.      Mental Status: She is alert and oriented to person, place, and time.      Comments: Mild diffuse weakness   Psychiatric:         Mood and Affect: Mood normal.             Significant Labs: All pertinent labs within the past 24 hours have been reviewed.    Significant Imaging: I have reviewed all pertinent imaging results/findings within the past 24 hours.      Assessment/Plan:      * Acute respiratory failure with hypoxia and hypercapnia  -Admitted to inpatient status  -Presented with shortness of breath.  In ED had wheezing and treated with continuous nebulizers and steroids.  She was admitted with presumptive COPD exacerbation and overnight developed worsening left knee pain, spike in blood pressure and myles respiratory distress requiring transfer to ICU and treatment with bipap.  -Noted history of former tobacco use, chronic bronchitis, chronic airflow limitation (no pfts available), chronic systolic chf and obesity.  -CXR initially was clear but after spike in blood pressure and respiratory distress the cxr showed new bilateral mixed interstitial and more focal airspace opacities with a bibasilar predominance   -Initial ABG showed a severe respiratory acidosis with pH 7.0 and pCO2 63.  -Flu and covid swabs are negative.  Procalcitonin is elevated.  Lactic acid is elevated  -Echo shows EF 40-50%, normal diastolic function and mild pulmonary hypertension with PASP 45mmHg  -This is acute hypoxic and hypercapnic respiratory failure.  She is not on oxygen at home.  -Pulmonology consulted and input appreciated.  Doubt pulmonary embolism.  More likely flash pulmonary edema due to elevated blood  pressure due to pain - all in context of quite poor reserve and chronic illness.  -Cardiology consulted and input appreciated.  Diuresed over last 24 hours with clinical improvement  -Today she is breathing comfortably on 3L O2.  WBC rising but she is afebrile.  Repeat cxr shows improvement without focal consolidations.  If leukocytosis is due to steroids she received, it should be improving soon.    -Continue to treat chronic pain as below - well controlled presently.  -Will hold diuretics at least this morning given recurrence of DKA.  Will give gentle IV fluids for at least 4 hours.  Noted BNP remains elevated so do not want to continue fluids for long before resuming diuretics.  -Will continue scheduled duo-nebs for now as well as supplemental oxygen and wean as able.  Continue bipap QHS.  -For now will hold off on further steroids due to DKA.  -Hold transfer out of ICU for now pending better diabetes control    Acute on chronic combined systolic and diastolic congestive heart failure  -On admit appeared euvolemic.  BNP not significantly elevated and CXR without evidence of pulmonary edema  -Overnight 6/20-6/21 developed respiratory distress associated with elevated blood pressure likely secondary to pain.  Repeat CXR showed pulmonary edema (not present on initial cxr).  -Echo reviewed as above - notable for mildly depressed EF (previously normal) and normal diastolic function  -This is acute on chronic combined systolic and diastolic chf  -Cardiology consulted and input appreciated    -Strict ins/outs and daily weights  -Noted slight bump in creatinine - holding losartan for now  -Have diuresed with iv lasix 40bid over last 24 hours and her respiratory status is improving, but again with recurrence of high aniongap and hyperglycemia this morning.  Will hold morning dose of lasix for now and give gentle fluids in effort to stave off worsening DKA and insulin drip resumption.  Likely resume lasix this afternoon -  ?lower dose? (at home was on torsemide 20mg qd)  -Continue blood pressure control with clonidine, toprol and nifedipine.    Essential hypertension  -Bp normal initially but spiked overnight 6/20-6/21 to 204/77 at [facility]   -BP today normal to mildly elevated  -Continue clonidine, toprol and nifedipine  -Holding losartan for now given bump in creatinine  -Prn hdyralazine available for SBP > 180    Diabetic ketoacidosis without coma associated with type 2 diabetes mellitus  -A1c 9.5 6/5/24  -Home regimen includes aspart 14 units tidwm and detemir 20 units qd +18 units qhs  -On admit hyperglycemic with blood sugar 325, but not in DKA  -Treated with IV steroids in ED and developed DKA on 1st night (Blood sugar 901, anion gap 23, BOHB 3.8)  -DKA pathway initiated and she was treated with insulin drip, limited fluids due to pulmonary edema and we monitored accu check q1h and bmp q4h.  Anion gap closed and she was been transitioned to subcutaneous insulin 6/22.  -Today blood sugar again markedly elevated with anion-gap acidosis.  Have made her NPO, given her long and short acting insulin early this morning, held morning lasix and started gentle IV fluids.  Repeat BMP in 4 hours.  For now continue with detemir 25 units bid + aspart 14 units tidwm, but may need to resume insulin drip pending repeat BMP.    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 pectoris  -Noted history  -Denies chest pain  -Echo without any notable segmental wall motion abnormalities  -Continue aspirin, toprol and statin     Chronic pain syndrome  -Noted history and follows with pain management.  -Home med list includes hydrocodone/acetaminophen 10/325 q8h prn, robaxin 500mg tid prn, lyrica 150mg TID  -Complains of left knee pain now but nothing obviously inflamed on my exam  -xray of left knee showed arthritic changes  -Consulted orthopedic surgery and input appreciated.  She underwent steroid injections to each knee on  6/21  -Continue allopurinol, hydrocodone, robaxin as at home  -Continue lidoderm patch to each knee.  Consider resumption of lyrica if she remains stable and has pain  -Consult PT/OT    Diabetic polyneuropathy  -Takes lyrica at home - holding for now to ensure stability of respiratory status    Stage 4 chronic kidney disease  -Baseline Cr 1.2-1.5  -On admit Cr 1.9 and has had mild hyperkalemia and pseudohyponatremia due to hyperglycemia  -Cr bumped to 2.2 on 6/21 but is improved to 1.6 today.  -WILLIAM without any evidence of hydronephrosis  -No urine eosinophils.  No significant proteinuria  -Avoid nephrotoxic agents and renally dose meds  -Hold home torsemide and losartan for now  -Have been diuresing with iv lasix 40mg q12 but has recurrence of acidosis and hyperglycemia.  Hold this mornings dose and give gentle IV fluids x 4 hours.  Still needs further diuresis once metabolic status is improved.  Likely resume lasix later today.  Monitor BMP closely.  -Repeat BMP q4h    Systemic lupus erythematosus, unspecified  -History noted but no specific medications on home med list  -Patient reports she was diagnosed with SLE and RA many years ago in [place].  She is unsure if she was ever on any specific medications for these.  -Chart reviewed and last seen by Dr. 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  STEVE from 2022 was negative  -I doubt she actually has SLE or RA.    Rheumatoid arthritis, unspecified  -History noted in chart, but unclear if truly has RA  -RF and CCP negative 2022  -Refiewed last rheumatology note from 2022 and no evidence of connective tissue disorder or RA at that time.    -I doubt she actually has SLE or RA    Hemiplegia and hemiparesis following cerebral infarction affecting left  non-dominant side  -History noted  -Fall precautions ordered  -Consult PT/OT    Class 2 obesity with body mass index (BMI) of 39.0 to 39.9 in adult  Body mass index is 39.05 kg/m². Morbid obesity complicates all aspects of disease management from diagnostic modalities to treatment. Weight loss encouraged and health benefits explained to patient.    Noncompliance with medication regimen  -History noted       VTE Risk Mitigation (From admission, onward)           Ordered     heparin (porcine) injection 5,000 Units  Every 8 hours (non-standard times)         06/21/24 1008     IP VTE HIGH RISK PATIENT  Once         06/21/24 1008     Place SUHAS hose  Until discontinued         06/21/24 1008     Place sequential compression device  Until discontinued         06/21/24 1008     Place sequential compression device  Until discontinued         06/21/24 0010     Place sequential compression device  Until discontinued         06/20/24 2353                    Discharge Planning   KYLE:      Code Status: Full Code   Is the patient medically ready for discharge?:     Reason for patient still in hospital (select all that apply): Treatment  Discharge Plan A: Home Health                  Rey Lieberman MD  Department of Hospital Medicine   [facility]ensive [facility] [place] [facility]

## 2024-06-23 NOTE — ASSESSMENT & PLAN NOTE
-Admitted to inpatient status  -Presented with shortness of breath.  In ED had wheezing and treated with continuous nebulizers and steroids.  She was admitted with presumptive COPD exacerbation and overnight developed worsening left knee pain, spike in blood pressure and myles respiratory distress requiring transfer to ICU and treatment with bipap.  -Noted history of former tobacco use, chronic bronchitis, chronic airflow limitation (no pfts available), chronic systolic chf and obesity.  -CXR initially was clear but after spike in blood pressure and respiratory distress the cxr showed new bilateral mixed interstitial and more focal airspace opacities with a bibasilar predominance   -Initial ABG showed a severe respiratory acidosis with pH 7.0 and pCO2 63.  -Flu and covid swabs are negative.  Procalcitonin is elevated.  Lactic acid is elevated  -Echo shows EF 40-50%, normal diastolic function and mild pulmonary hypertension with PASP 45mmHg  -This is acute hypoxic and hypercapnic respiratory failure.  She is not on oxygen at home.  -Pulmonology consulted and input appreciated.  Doubt pulmonary embolism.  More likely flash pulmonary edema due to elevated blood pressure due to pain - all in context of quite poor reserve and chronic illness.  -Cardiology consulted and input appreciated.  Diuresed over last 24 hours with clinical improvement  -Today she is breathing comfortably on 3L O2.  WBC rising but she is afebrile.  Repeat cxr shows improvement without focal consolidations.  If leukocytosis is due to steroids she received, it should be improving soon.    -Continue to treat chronic pain as below - well controlled presently.  -Will hold diuretics at least this morning given recurrence of DKA.  Will give gentle IV fluids for at least 4 hours.  Noted BNP remains elevated so do not want to continue fluids for long before resuming diuretics.  -Will continue scheduled duo-nebs for now as well as supplemental oxygen and  wean as able.  Continue bipap QHS.  -For now will hold off on further steroids due to DKA.  -Hold transfer out of ICU for now pending better diabetes control

## 2024-06-23 NOTE — PT/OT/SLP EVAL
Occupational Therapy   Evaluation and Treatment    Name: Indira Waters  MRN: [id number]  Admitting Diagnosis: Acute respiratory failure with hypoxia and hypercapnia  Recent Surgery: * No surgery found *      Recommendations:     Discharge Recommendations: Low Intensity Therapy (Anticipate need for post acute Low Intensity therapy with 24/7 supervision/assistance.  Lives with daughter that is willing and able to assist.)  Discharge Equipment Recommendations:  bath bench (will defer AD to PT)  Barriers to discharge:       Assessment:   Initial OT eval/treat complete.  Has SPC and shower stool; also has long handled sponge for lower body bathing.  Anticipate need for RW though will defer AD to PT.  Needs TTB.  Anticipate need for post acute Moderate Intensity therapy.  Previously living with daughter that assists Pt. With tub transfers, lower body dressing, and iADL.  Pt. Has 1 flight of stairs to reach her 2nd [place] apt.  To benefit from continued acute care OT services to increase independence in self-care/functional transfers.  OT to follow.     Indira Waters is a 79 y.o. female with a medical diagnosis of Acute respiratory failure with hypoxia and hypercapnia.  She presents with below deficits decreasing independence in self-care/functional transfers. Performance deficits affecting function: weakness, impaired endurance, impaired self care skills, impaired functional mobility, gait instability, impaired balance, impaired cognition, decreased upper extremity function, decreased lower extremity function, decreased safety awareness, impaired cardiopulmonary response to activity.      Rehab Prognosis: Good; patient would benefit from acute skilled OT services to address these deficits and reach maximum level of function.       Plan:     Patient to be seen 4 x/week to address the above listed problems via self-care/home management, therapeutic activities, therapeutic exercises  Plan of Care Expires:  07/07/24  Plan of Care Reviewed with: patient, daughter    Subjective     Chief Complaint: No c/o pain.  Pt. Expressed needing to have BM.   Patient/Family Comments/goals: Pt. Expressed needing to have BM.     Occupational Profile:  Lives with daughter in [address] [place] [address] with 1 flight of stairs with unilateral handrail to reach unit; bathroom setup as tub/shower combo with shower stool and standard toilet.  Pt. Ambulates with SPC always in the community though only uses SPC in home based on BLE knee pain.  Previously supervision/SBA for tub/transfer and MOD I for other ADL tasks.   Equipment Used at Home: cane, straight (shower stool)  Assistance upon Discharge: Daughter willing/able to assist.     Pain/Comfort:  Pain Rating 1: 0/10  Pain Rating Post-Intervention 1: 0/10    Patients cultural, spiritual, Mu-ism conflicts given the current situation:      Objective:     Communicated with: Gavino MONTAÑO RN prior to session.  Patient found HOB elevated with peripheral IV, PureWick (ICU monitoring) and daughter present upon OT entry to room.    General Precautions: Standard, fall, diabetic, NPO (strict I&O's; cleared for ice chips and small sips with medication)  Orthopedic Precautions: N/A  Braces: N/A  Respiratory Status: Nasal cannula, flow 3 L/min    Occupational Performance:    Bed Mobility:    Supine>sit with MIN A for LE management and increased cuing for task sequencing.     Functional Mobility/Transfers:  Sit>stand EOB>RW with CGA and ambulating bed>standard toilet with SPC, HHA, CGA for steadying/safety, and increased assist with line management.  Step transfer to standard toilet with CGA for steadying/safety and use of grab bar during transitions.  Ambulating toilet>bedside chair with RW, CGA for steadying/safety.  Step transfer to bedside chair using RW, with CGA for steadying/safety with MOD verbal cuing for safe hand placement during transitions and safe timing of controlled descent.  Step transfer to bedside  "chair with     Activities of Daily Living:  TOTAL A for donning nonskid socks at bed level.  Having BM seated at toilet and assist for thorough back momo hygiene with no need for clothing management d/t back opening gown; able to assist with cleaning back momo region standing with unilateral UE support forward flexed trunk and backward reaching with RUE to clean area X 3 trials.      Cognitive/Visual Perceptual:  Cognitive/Psychosocial Skills:  -       Oriented to: Person, knows , current year though states the month is "July"   -       Follows Commands/attention:Follows one-step commands though with a 3-4 second delay in follow through  -       Communication: able to express basic needs such as not wanting blankets and needing to have BM though with 4-second delay in response time when answering questions  -       Memory: Deficits noted  -       Safety awareness/insight to disability: impaired   -       Mood/Affect/Coping skills/emotional control: Cooperative, Pleasant, and Confused    Physical Exam:  Postural examination/scapula alignment: -       Rounded shoulders  -       Forward head  -       Forward flexed trunk  Upper Extremity Range of Motion:  -       Right Upper Extremity: WFL  -       Left Upper Extremity: WFL  Upper Extremity Strength: -       Right Upper Extremity: WFL  -       Left Upper Extremity: WFL   Strength: -       Right Upper Extremity: WFL  -       Left Upper Extremity: WFL  Fine Motor Coordination: -       Intact  Left hand thumb/finger opposition skills and Right hand thumb/finger opposition skills    AMPAC 6 Click ADL:  AMPAC Total Score: 17    Treatment & Education:  Educated on role of OT, and POC.   Supine>sit with MIN A for LE management and increased cuing for task sequencing.   Sit>stand EOB>RW with CGA and ambulating bed>standard toilet with SPC, HHA, CGA for steadying/safety, and increased assist with line management.  Step transfer to standard toilet with CGA for " steadying/safety and use of grab bar during transitions.  Ambulating toilet>bedside chair with RW, CGA for steadying/safety.  Step transfer to bedside chair using RW, with CGA for steadying/safety with MOD verbal cuing for safe hand placement during transitions and safe timing of controlled descent.  Step transfer to bedside chair with  TOTAL A for donning nonskid socks at bed level.  Having BM seated at toilet and assist for thorough back momo hygiene with no need for clothing management d/t back opening gown; able to assist with cleaning back momo region standing with unilateral UE support forward flexed trunk and backward reaching with RUE to clean area X 3 trials.    Received call light review and importance of calling for assist as needed.     Patient left up in chair with all lines intact, call button in reach, and nursing notified    GOALS:   Multidisciplinary Problems       Occupational Therapy Goals          Problem: Occupational Therapy    Goal Priority Disciplines Outcome Interventions   Occupational Therapy Goal     OT, PT/OT Progressing    Description: Goals to be met by: 2024     Patient will increase functional independence with ADLs by performing:    UE Dressing with Supervision.  LE Dressing with Stand-by Assistance and Assistive Devices as needed.  Grooming while standing at sink with Stand-by Assistance.  Toileting from toilet with Supervision for hygiene and clothing management.   Toilet transfer to toilet with Supervision.                         History:     Past Medical History:   Diagnosis Date    Arthritis     Back pain     Cancer     ovarian    Cervical cancer     Coronary artery disease     Depression     Diabetes mellitus     Fibromyalgia     Heart attack     History of MI (myocardial infarction)     Hyperlipidemia     Hypertension     Lupus     Stroke     slight left sided weakness         Past Surgical History:   Procedure Laterality Date    APPENDECTOMY       SECTION      2     CORONARY ANGIOGRAPHY N/A 05/06/2022    Procedure: ANGIOGRAM, CORONARY ARTERY;  Surgeon: Jose L Méndez MD;  Location: [facility] CATH LAB;  Service: Cardiology;  Laterality: N/A;    HYSTERECTOMY      with USO for cervical cancer    INJECTION OF ANESTHETIC AGENT AROUND NERVE Bilateral 05/05/2021    Procedure: BLOCK, NERVE, SYMPATHIC;  Surgeon: Holden Pereira MD;  Location: [facility] PAIN MGT;  Service: Pain Management;  Laterality: Bilateral;    INJECTION OF ANESTHETIC AGENT AROUND NERVE N/A 08/25/2021    Procedure: BLOCK, NERVE, SYMPATHETIC  need consent;  Surgeon: Holden Pereira MD;  Location: [facility] PAIN MGT;  Service: Pain Management;  Laterality: N/A;    YARED LINK,SWALLOW FUNCTION,CINE/VIDEO RECORD  09/09/2021         TONSILLECTOMY      TRIAL OF SPINAL CORD NERVE STIMULATOR N/A 3/8/2023    Procedure: LUMBAR SPINAL CORD STIMULATOR TRIAL NEVRO REP PATIENT STATES SHE NO LONGER TAKES PLAVIX;  Surgeon: Holden Pereira MD;  Location: [facility] PAIN MGT;  Service: Pain Management;  Laterality: N/A;       Time Tracking:     OT Date of Treatment: 06/23/24  OT Start Time: 1119  OT Stop Time: 1215  OT Total Time (min): 56 min    Billable Minutes:Evaluation 15  Self Care/Home Management 41    6/23/2024

## 2024-06-23 NOTE — SUBJECTIVE & OBJECTIVE
Interval History: No acute events overnight.  Today states she is feeling OK and denies shortness of breath.  All questions answered and patient had no further complaints.    Objective:     Vital Signs (Most Recent):  Temp: 98.8 °F (37.1 °C) (06/23/24 0705)  Pulse: 89 (06/23/24 1000)  Resp: (!) 24 (06/23/24 1000)  BP: 136/63 (06/23/24 1000)  SpO2: (!) 93 % (06/23/24 1000) Vital Signs (24h Range):  Temp:  [98.6 °F (37 °C)-99.6 °F (37.6 °C)] 98.8 °F (37.1 °C)  Pulse:  [84-94] 89  Resp:  [16-27] 24  SpO2:  [91 %-95 %] 93 %  BP: (121-153)/(58-68) 136/63     Weight: 90.7 kg (199 lb 15.3 oz)  Body mass index is 39.05 kg/m².    Intake/Output Summary (Last 24 hours) at 6/23/2024 1054  Last data filed at 6/23/2024 0529  Gross per 24 hour   Intake 885.26 ml   Output 2700 ml   Net -1814.74 ml         Physical Exam  Vitals and nursing note reviewed.   Constitutional:       General: She is not in acute distress.     Appearance: She is well-developed. She is obese. She is ill-appearing (chronically). She is not toxic-appearing or diaphoretic.   HENT:      Head: Normocephalic and atraumatic.      Mouth/Throat:      Mouth: Mucous membranes are moist.   Eyes:      Extraocular Movements: Extraocular movements intact.   Neck:      Trachea: No tracheal deviation.   Cardiovascular:      Rate and Rhythm: Normal rate and regular rhythm.      Heart sounds: Normal heart sounds. No murmur heard.     No gallop.      Comments: Trace LLE edema.     Pulmonary:      Comments: Breathing comfortably on bipap, overall good air movement without wheezing, but with diffuse loud course rales  Abdominal:      General: Bowel sounds are normal. There is no distension.      Palpations: Abdomen is soft. There is no mass.      Tenderness: There is no abdominal tenderness. There is no guarding.   Musculoskeletal:         General: Swelling present. Tenderness: left knee.     Cervical back: Normal range of motion.      Left lower leg: Edema present.       Comments: Lets are mildly edematous.  Left knee without myles swelling, warmth or redness and is not tender to superficial palpation   Skin:     General: Skin is warm and dry.      Coloration: Skin is not pale.      Findings: No erythema or rash.   Neurological:      General: No focal deficit present.      Mental Status: She is alert and oriented to person, place, and time.      Comments: Mild diffuse weakness   Psychiatric:         Mood and Affect: Mood normal.             Significant Labs: All pertinent labs within the past 24 hours have been reviewed.    Significant Imaging: I have reviewed all pertinent imaging results/findings within the past 24 hours.

## 2024-06-24 LAB
ALBUMIN SERPL BCP-MCNC: 2.8 G/DL (ref 3.5–5.2)
ALP SERPL-CCNC: 57 U/L (ref 55–135)
ALT SERPL W/O P-5'-P-CCNC: 12 U/L (ref 10–44)
ANION GAP SERPL CALC-SCNC: 11 MMOL/L (ref 8–16)
AST SERPL-CCNC: 16 U/L (ref 10–40)
BASOPHILS # BLD AUTO: 0.05 K/UL (ref 0–0.2)
BASOPHILS NFR BLD: 0.3 % (ref 0–1.9)
BILIRUB SERPL-MCNC: 0.3 MG/DL (ref 0.1–1)
BUN SERPL-MCNC: 29 MG/DL (ref 8–23)
CALCIUM SERPL-MCNC: 9.2 MG/DL (ref 8.7–10.5)
CHLORIDE SERPL-SCNC: 102 MMOL/L (ref 95–110)
CO2 SERPL-SCNC: 26 MMOL/L (ref 23–29)
CREAT SERPL-MCNC: 1.4 MG/DL (ref 0.5–1.4)
DIFFERENTIAL METHOD BLD: ABNORMAL
EOSINOPHIL # BLD AUTO: 0 K/UL (ref 0–0.5)
EOSINOPHIL NFR BLD: 0.2 % (ref 0–8)
ERYTHROCYTE [DISTWIDTH] IN BLOOD BY AUTOMATED COUNT: 15.8 % (ref 11.5–14.5)
EST. GFR  (NO RACE VARIABLE): 39 ML/MIN/1.73 M^2
GLUCOSE SERPL-MCNC: 205 MG/DL (ref 70–110)
HCT VFR BLD AUTO: 30.2 % (ref 37–48.5)
HGB BLD-MCNC: 9.6 G/DL (ref 12–16)
IMM GRANULOCYTES # BLD AUTO: 0.15 K/UL (ref 0–0.04)
IMM GRANULOCYTES NFR BLD AUTO: 0.9 % (ref 0–0.5)
LYMPHOCYTES # BLD AUTO: 1.5 K/UL (ref 1–4.8)
LYMPHOCYTES NFR BLD: 9.1 % (ref 18–48)
MAGNESIUM SERPL-MCNC: 2 MG/DL (ref 1.6–2.6)
MCH RBC QN AUTO: 28.1 PG (ref 27–31)
MCHC RBC AUTO-ENTMCNC: 31.8 G/DL (ref 32–36)
MCV RBC AUTO: 88 FL (ref 82–98)
MONOCYTES # BLD AUTO: 1.1 K/UL (ref 0.3–1)
MONOCYTES NFR BLD: 6.5 % (ref 4–15)
NEUTROPHILS # BLD AUTO: 13.6 K/UL (ref 1.8–7.7)
NEUTROPHILS NFR BLD: 83 % (ref 38–73)
NRBC BLD-RTO: 0 /100 WBC
PHOSPHATE SERPL-MCNC: 3.8 MG/DL (ref 2.7–4.5)
PLATELET # BLD AUTO: 248 K/UL (ref 150–450)
PMV BLD AUTO: 11.5 FL (ref 9.2–12.9)
POCT GLUCOSE: 208 MG/DL (ref 70–110)
POCT GLUCOSE: 225 MG/DL (ref 70–110)
POCT GLUCOSE: 257 MG/DL (ref 70–110)
POCT GLUCOSE: 65 MG/DL (ref 70–110)
POCT GLUCOSE: 76 MG/DL (ref 70–110)
POTASSIUM SERPL-SCNC: 3.5 MMOL/L (ref 3.5–5.1)
PROT SERPL-MCNC: 6.8 G/DL (ref 6–8.4)
RBC # BLD AUTO: 3.42 M/UL (ref 4–5.4)
SODIUM SERPL-SCNC: 139 MMOL/L (ref 136–145)
WBC # BLD AUTO: 16.43 K/UL (ref 3.9–12.7)

## 2024-06-24 PROCEDURE — 25000003 PHARM REV CODE 250: Performed by: EMERGENCY MEDICINE

## 2024-06-24 PROCEDURE — 80053 COMPREHEN METABOLIC PANEL: CPT | Performed by: STUDENT IN AN ORGANIZED HEALTH CARE EDUCATION/TRAINING PROGRAM

## 2024-06-24 PROCEDURE — 97530 THERAPEUTIC ACTIVITIES: CPT | Mod: CQ

## 2024-06-24 PROCEDURE — 94640 AIRWAY INHALATION TREATMENT: CPT

## 2024-06-24 PROCEDURE — 25000003 PHARM REV CODE 250: Performed by: INTERNAL MEDICINE

## 2024-06-24 PROCEDURE — 85025 COMPLETE CBC W/AUTO DIFF WBC: CPT | Performed by: HOSPITALIST

## 2024-06-24 PROCEDURE — 36415 COLL VENOUS BLD VENIPUNCTURE: CPT | Performed by: HOSPITALIST

## 2024-06-24 PROCEDURE — 99233 SBSQ HOSP IP/OBS HIGH 50: CPT | Mod: ,,, | Performed by: INTERNAL MEDICINE

## 2024-06-24 PROCEDURE — 94761 N-INVAS EAR/PLS OXIMETRY MLT: CPT

## 2024-06-24 PROCEDURE — 94660 CPAP INITIATION&MGMT: CPT

## 2024-06-24 PROCEDURE — 21400001 HC TELEMETRY ROOM

## 2024-06-24 PROCEDURE — 97530 THERAPEUTIC ACTIVITIES: CPT

## 2024-06-24 PROCEDURE — 63600175 PHARM REV CODE 636 W HCPCS: Performed by: HOSPITALIST

## 2024-06-24 PROCEDURE — 83735 ASSAY OF MAGNESIUM: CPT | Performed by: STUDENT IN AN ORGANIZED HEALTH CARE EDUCATION/TRAINING PROGRAM

## 2024-06-24 PROCEDURE — 63600175 PHARM REV CODE 636 W HCPCS: Performed by: PHYSICIAN ASSISTANT

## 2024-06-24 PROCEDURE — 86580 TB INTRADERMAL TEST: CPT | Performed by: HOSPITALIST

## 2024-06-24 PROCEDURE — 99900035 HC TECH TIME PER 15 MIN (STAT)

## 2024-06-24 PROCEDURE — A4216 STERILE WATER/SALINE, 10 ML: HCPCS | Performed by: PHYSICIAN ASSISTANT

## 2024-06-24 PROCEDURE — 84100 ASSAY OF PHOSPHORUS: CPT | Performed by: HOSPITALIST

## 2024-06-24 PROCEDURE — 97116 GAIT TRAINING THERAPY: CPT | Mod: CQ

## 2024-06-24 PROCEDURE — 25000242 PHARM REV CODE 250 ALT 637 W/ HCPCS: Performed by: PHYSICIAN ASSISTANT

## 2024-06-24 PROCEDURE — 25000003 PHARM REV CODE 250: Performed by: HOSPITALIST

## 2024-06-24 PROCEDURE — 30200315 PPD INTRADERMAL TEST REV CODE 302: Performed by: HOSPITALIST

## 2024-06-24 PROCEDURE — 25000003 PHARM REV CODE 250: Performed by: PHYSICIAN ASSISTANT

## 2024-06-24 PROCEDURE — 27000221 HC OXYGEN, UP TO 24 HOURS

## 2024-06-24 PROCEDURE — 63600175 PHARM REV CODE 636 W HCPCS: Performed by: INTERNAL MEDICINE

## 2024-06-24 RX ORDER — INSULIN GLARGINE 100 [IU]/ML
35 INJECTION, SOLUTION SUBCUTANEOUS 2 TIMES DAILY
Status: DISCONTINUED | OUTPATIENT
Start: 2024-06-24 | End: 2024-06-25

## 2024-06-24 RX ORDER — FUROSEMIDE 10 MG/ML
20 INJECTION INTRAMUSCULAR; INTRAVENOUS 2 TIMES DAILY
Status: COMPLETED | OUTPATIENT
Start: 2024-06-24 | End: 2024-06-25

## 2024-06-24 RX ORDER — INSULIN ASPART 100 [IU]/ML
20 INJECTION, SOLUTION INTRAVENOUS; SUBCUTANEOUS
Status: DISCONTINUED | OUTPATIENT
Start: 2024-06-24 | End: 2024-06-25

## 2024-06-24 RX ADMIN — Medication 10 ML: at 06:06

## 2024-06-24 RX ADMIN — MICONAZOLE NITRATE: 20 OINTMENT TOPICAL at 09:06

## 2024-06-24 RX ADMIN — HYDROCODONE BITARTRATE AND ACETAMINOPHEN 1 TABLET: 10; 325 TABLET ORAL at 03:06

## 2024-06-24 RX ADMIN — INSULIN GLARGINE 30 UNITS: 100 INJECTION, SOLUTION SUBCUTANEOUS at 09:06

## 2024-06-24 RX ADMIN — METOCLOPRAMIDE 5 MG: 5 TABLET ORAL at 09:06

## 2024-06-24 RX ADMIN — FLUTICASONE PROPIONATE 50 MCG: 50 SPRAY, METERED NASAL at 09:06

## 2024-06-24 RX ADMIN — IPRATROPIUM BROMIDE AND ALBUTEROL SULFATE 3 ML: 2.5; .5 SOLUTION RESPIRATORY (INHALATION) at 03:06

## 2024-06-24 RX ADMIN — SERTRALINE HYDROCHLORIDE 100 MG: 50 TABLET ORAL at 09:06

## 2024-06-24 RX ADMIN — INSULIN ASPART 16 UNITS: 100 INJECTION, SOLUTION INTRAVENOUS; SUBCUTANEOUS at 07:06

## 2024-06-24 RX ADMIN — Medication 10 ML: at 09:06

## 2024-06-24 RX ADMIN — CLONIDINE HYDROCHLORIDE 0.1 MG: 0.1 TABLET ORAL at 09:06

## 2024-06-24 RX ADMIN — IPRATROPIUM BROMIDE AND ALBUTEROL SULFATE 3 ML: 2.5; .5 SOLUTION RESPIRATORY (INHALATION) at 07:06

## 2024-06-24 RX ADMIN — INSULIN ASPART 20 UNITS: 100 INJECTION, SOLUTION INTRAVENOUS; SUBCUTANEOUS at 12:06

## 2024-06-24 RX ADMIN — FUROSEMIDE 20 MG: 10 INJECTION, SOLUTION INTRAMUSCULAR; INTRAVENOUS at 05:06

## 2024-06-24 RX ADMIN — NIFEDIPINE 30 MG: 30 TABLET, FILM COATED, EXTENDED RELEASE ORAL at 09:06

## 2024-06-24 RX ADMIN — METOCLOPRAMIDE 5 MG: 5 TABLET ORAL at 06:06

## 2024-06-24 RX ADMIN — HEPARIN SODIUM 5000 UNITS: 5000 INJECTION INTRAVENOUS; SUBCUTANEOUS at 11:06

## 2024-06-24 RX ADMIN — CLONIDINE HYDROCHLORIDE 0.1 MG: 0.1 TABLET ORAL at 08:06

## 2024-06-24 RX ADMIN — Medication 6 MG: at 09:06

## 2024-06-24 RX ADMIN — METOCLOPRAMIDE 5 MG: 5 TABLET ORAL at 05:06

## 2024-06-24 RX ADMIN — FUROSEMIDE 20 MG: 10 INJECTION, SOLUTION INTRAMUSCULAR; INTRAVENOUS at 08:06

## 2024-06-24 RX ADMIN — METHOCARBAMOL 500 MG: 500 TABLET ORAL at 09:06

## 2024-06-24 RX ADMIN — IPRATROPIUM BROMIDE AND ALBUTEROL SULFATE 3 ML: 2.5; .5 SOLUTION RESPIRATORY (INHALATION) at 08:06

## 2024-06-24 RX ADMIN — HEPARIN SODIUM 5000 UNITS: 5000 INJECTION INTRAVENOUS; SUBCUTANEOUS at 03:06

## 2024-06-24 RX ADMIN — ASPIRIN 81 MG: 81 TABLET, COATED ORAL at 08:06

## 2024-06-24 RX ADMIN — TUBERCULIN PURIFIED PROTEIN DERIVATIVE 5 UNITS: 5 INJECTION, SOLUTION INTRADERMAL at 03:06

## 2024-06-24 RX ADMIN — ALLOPURINOL 300 MG: 300 TABLET ORAL at 08:06

## 2024-06-24 RX ADMIN — IPRATROPIUM BROMIDE AND ALBUTEROL SULFATE 3 ML: 2.5; .5 SOLUTION RESPIRATORY (INHALATION) at 11:06

## 2024-06-24 RX ADMIN — INSULIN ASPART 2 UNITS: 100 INJECTION, SOLUTION INTRAVENOUS; SUBCUTANEOUS at 09:06

## 2024-06-24 RX ADMIN — MUPIROCIN: 20 OINTMENT TOPICAL at 08:06

## 2024-06-24 RX ADMIN — HEPARIN SODIUM 5000 UNITS: 5000 INJECTION INTRAVENOUS; SUBCUTANEOUS at 09:06

## 2024-06-24 RX ADMIN — MUPIROCIN: 20 OINTMENT TOPICAL at 09:06

## 2024-06-24 RX ADMIN — INSULIN GLARGINE 35 UNITS: 100 INJECTION, SOLUTION SUBCUTANEOUS at 09:06

## 2024-06-24 RX ADMIN — METOPROLOL SUCCINATE 100 MG: 50 TABLET, EXTENDED RELEASE ORAL at 09:06

## 2024-06-24 RX ADMIN — HYDROCODONE BITARTRATE AND ACETAMINOPHEN 1 TABLET: 10; 325 TABLET ORAL at 06:06

## 2024-06-24 RX ADMIN — METOCLOPRAMIDE 5 MG: 5 TABLET ORAL at 11:06

## 2024-06-24 RX ADMIN — METOPROLOL SUCCINATE 100 MG: 50 TABLET, EXTENDED RELEASE ORAL at 08:06

## 2024-06-24 RX ADMIN — LIDOCAINE 5% 2 PATCH: 700 PATCH TOPICAL at 08:06

## 2024-06-24 NOTE — PROGRESS NOTES
[facility]    Admission date:  6/20/2024     Assessment    Acute on chronic combined systolic and diastolic heart failure   Diuresis resuming     Coronary atherosclerosis   No angina     Hypertension  Blood pressures are improved    Plan and Discussion    Continue with present management.  Ideally, resume losartan when her renal function is stable and taper either clonidine or nifedipine.    Subjective    No complaints    Medications  Current Facility-Administered Medications   Medication Dose Route Frequency Provider Last Rate Last Admin    acetaminophen tablet 650 mg  650 mg Oral Q6H PRN Marifer Boudreaux PA-C   650 mg at 06/21/24 0043    albuterol-ipratropium 2.5 mg-0.5 mg/3 mL nebulizer solution 3 mL  3 mL Nebulization Q4H PRN Marifer Boudreaux PA-C        albuterol-ipratropium 2.5 mg-0.5 mg/3 mL nebulizer solution 3 mL  3 mL Nebulization Q4H Marifer Boudreaux PA-C   3 mL at 06/24/24 0732    allopurinoL tablet 300 mg  300 mg Oral Daily Marifer Boudreaux PA-C   300 mg at 06/24/24 0802    aspirin EC tablet 81 mg  81 mg Oral Daily Marifer Boudreaux PA-C   81 mg at 06/24/24 0802    cloNIDine tablet 0.1 mg  0.1 mg Oral BID Jose L Méndez MD   0.1 mg at 06/24/24 0802    dextrose 10% bolus 125 mL 125 mL  12.5 g Intravenous PRN Marker, Rey BURDICK MD        dextrose 10% bolus 125 mL 125 mL  12.5 g Intravenous PRN MarkerRey MD        dextrose 10% bolus 125 mL 125 mL  12.5 g Intravenous PRN MarkerRey MD        dextrose 10% bolus 250 mL 250 mL  25 g Intravenous PRN Rey Lieberman MD        dextrose 10% bolus 250 mL 250 mL  25 g Intravenous PRN MarkerRey MD        dextrose 10% bolus 250 mL 250 mL  25 g Intravenous PRN Rey Lieberman MD        dextrose 5 % and 0.45 % NaCl infusion  125 mL/hr Intravenous Continuous PRN Mio, Rey BURDICK MD        fluticasone propionate 50 mcg/actuation nasal spray 50 mcg  1 spray Each Nostril Daily Marifer Boudreaux PA-C   50 mcg  at 06/23/24 0945    furosemide injection 20 mg  20 mg Intravenous Q12H Rey Lieberman MD   20 mg at 06/24/24 0802    heparin (porcine) injection 5,000 Units  5,000 Units Subcutaneous Q8H Rey Lieberman MD   5,000 Units at 06/24/24 0346    hydrALAZINE injection 15 mg  15 mg Intravenous Q4H PRN Rey Lieberman MD        HYDROcodone-acetaminophen  mg per tablet 1 tablet  1 tablet Oral Q8H PRN Marifer Boudreaux PA-C   1 tablet at 06/24/24 0620    insulin aspart U-100 pen 0-10 Units  0-10 Units Subcutaneous QID (AC + HS) PRN Karissa Qureshi MD   4 Units at 06/23/24 1623    insulin aspart U-100 pen 16 Units  16 Units Subcutaneous TIDWM Rey Lieberman MD   16 Units at 06/24/24 0749    insulin glargine U-100 (Lantus) pen 30 Units  30 Units Subcutaneous BID Karissa Qureshi MD   30 Units at 06/23/24 2016    LIDOcaine 5 % patch 2 patch  2 patch Transdermal Daily Rey Lieberman MD   2 patch at 06/24/24 0801    melatonin tablet 6 mg  6 mg Oral Nightly PRN Jazzmine Martinez MD   6 mg at 06/23/24 2015    methocarbamoL tablet 500 mg  500 mg Oral TID PRN Marifer Boudreaux PA-C   500 mg at 06/23/24 2015    metoclopramide HCl tablet 5 mg  5 mg Oral QID (AC & HS) Marifer Boudreaux PA-C   5 mg at 06/24/24 0620    metoprolol succinate (TOPROL-XL) 24 hr tablet 100 mg  100 mg Oral BID Jose L Méndez MD   100 mg at 06/24/24 0801    miconazole nitrate 2% ointment   Topical (Top) BID Rey Lieberman MD   Given at 06/23/24 2028    mupirocin 2 % ointment   Nasal BID Rey Lieberman MD   Given at 06/24/24 0802    naloxone 0.4 mg/mL injection 0.02 mg  0.02 mg Intravenous PRN Marifer Boudreaux PA-C        NIFEdipine 24 hr tablet 30 mg  30 mg Oral BID Jose L Méndez MD   30 mg at 06/23/24 2015    ondansetron injection 4 mg  4 mg Intravenous Q4H PRN Rey Lieberman MD        senna-docusate 8.6-50 mg per tablet 1 tablet  1 tablet Oral BID PRN Marifer Boudreaux, PA-C        sertraline tablet 100 mg  100 mg  Oral Daily Marifer Boudreaux PA-C   100 mg at 06/23/24 0943    sodium chloride 0.9% flush 10 mL  10 mL Intravenous PRN Jazzmine Martinez MD        sodium chloride 0.9% flush 10 mL  10 mL Intravenous Q8H Marifer Boudreaux PA-C   10 mL at 06/24/24 0621    sodium chloride 0.9% flush 10 mL  10 mL Intravenous PRN Rey Lieberman MD        sodium chloride 0.9% flush 3 mL  3 mL Intravenous PRN Marifer Boudreaux PA-C            Physical Exam    Temp:  [98 °F (36.7 °C)-98.7 °F (37.1 °C)]   Pulse:  [76-94]   Resp:  [13-27]   BP: (110-147)/(55-72)   SpO2:  [89 %-100 %]    Wt Readings from Last 3 Encounters:   06/23/24 90.7 kg (199 lb 15.3 oz)   05/22/24 89.8 kg (198 lb)   05/15/24 89.9 kg (198 lb 3.2 oz)     Physical Exam  Constitutional:       General: She is not in acute distress.     Interventions: Nasal cannula in place.   Neck:      Vascular: No hepatojugular reflux or JVD.   Cardiovascular:      Rate and Rhythm: Normal rate and regular rhythm.      Heart sounds: S1 normal and S2 normal. Murmur heard.      Systolic murmur is present.      Gallop present. S4 sounds present. No S3 sounds.   Pulmonary:      Effort: Pulmonary effort is normal.      Breath sounds: Normal air entry. Rhonchi present.   Abdominal:      General: Bowel sounds are normal.      Palpations: Abdomen is soft. There is no hepatomegaly.      Tenderness: There is no abdominal tenderness.   Musculoskeletal:      Right lower leg: Edema present.      Left lower leg: Edema present.   Skin:     Coloration: Skin is not pale.   Neurological:      Mental Status: She is alert.   Psychiatric:         Behavior: Behavior is cooperative.         Telemetry  Sinus rhythm    Labs  Recent Results (from the past 24 hour(s))   POCT glucose    Collection Time: 06/23/24  9:09 AM   Result Value Ref Range    POCT Glucose 389 (H) 70 - 110 mg/dL   ISTAT PROCEDURE    Collection Time: 06/23/24  9:35 AM   Result Value Ref Range    POC PH 7.445 7.35 - 7.45    POC PCO2 39.3  35 - 45 mmHg    POC PO2 34 (LL) 40 - 60 mmHg    POC HCO3 27.0 24 - 28 mmol/L    POC BE 3 (H) -2 to 2 mmol/L    POC SATURATED O2 69 95 - 100 %    POC TCO2 28 24 - 29 mmol/L    POC Hematocrit 28 (L) 36 - 54 %PCV    POC HEMOGLOBIN 10 g/dL    Sample VENOUS     Site Other     Allens Test N/A    Basic Metabolic Panel    Collection Time: 06/23/24 10:53 AM   Result Value Ref Range    Sodium 136 136 - 145 mmol/L    Potassium 3.9 3.5 - 5.1 mmol/L    Chloride 97 95 - 110 mmol/L    CO2 23 23 - 29 mmol/L    Glucose 473 (HH) 70 - 110 mg/dL    BUN 32 (H) 8 - 23 mg/dL    Creatinine 1.6 (H) 0.5 - 1.4 mg/dL    Calcium 9.2 8.7 - 10.5 mg/dL    Anion Gap 16 8 - 16 mmol/L    eGFR 33 (A) >60 mL/min/1.73 m^2   Lactic Acid, Plasma    Collection Time: 06/23/24 10:53 AM   Result Value Ref Range    Lactate (Lactic Acid) 1.0 0.5 - 2.2 mmol/L   POCT glucose    Collection Time: 06/23/24 10:54 AM   Result Value Ref Range    POCT Glucose 449 (H) 70 - 110 mg/dL   Basic Metabolic Panel    Collection Time: 06/23/24  1:01 PM   Result Value Ref Range    Sodium 138 136 - 145 mmol/L    Potassium 3.6 3.5 - 5.1 mmol/L    Chloride 99 95 - 110 mmol/L    CO2 25 23 - 29 mmol/L    Glucose 330 (H) 70 - 110 mg/dL    BUN 30 (H) 8 - 23 mg/dL    Creatinine 1.6 (H) 0.5 - 1.4 mg/dL    Calcium 9.3 8.7 - 10.5 mg/dL    Anion Gap 14 8 - 16 mmol/L    eGFR 33 (A) >60 mL/min/1.73 m^2   POCT glucose    Collection Time: 06/23/24  4:18 PM   Result Value Ref Range    POCT Glucose 245 (H) 70 - 110 mg/dL   POCT glucose    Collection Time: 06/23/24  8:06 PM   Result Value Ref Range    POCT Glucose 183 (H) 70 - 110 mg/dL   Phosphorus    Collection Time: 06/24/24  3:54 AM   Result Value Ref Range    Phosphorus 3.8 2.7 - 4.5 mg/dL   Comprehensive metabolic panel    Collection Time: 06/24/24  3:54 AM   Result Value Ref Range    Sodium 139 136 - 145 mmol/L    Potassium 3.5 3.5 - 5.1 mmol/L    Chloride 102 95 - 110 mmol/L    CO2 26 23 - 29 mmol/L    Glucose 205 (H) 70 - 110 mg/dL    BUN  29 (H) 8 - 23 mg/dL    Creatinine 1.4 0.5 - 1.4 mg/dL    Calcium 9.2 8.7 - 10.5 mg/dL    Total Protein 6.8 6.0 - 8.4 g/dL    Albumin 2.8 (L) 3.5 - 5.2 g/dL    Total Bilirubin 0.3 0.1 - 1.0 mg/dL    Alkaline Phosphatase 57 55 - 135 U/L    AST 16 10 - 40 U/L    ALT 12 10 - 44 U/L    eGFR 39 (A) >60 mL/min/1.73 m^2    Anion Gap 11 8 - 16 mmol/L   Magnesium    Collection Time: 06/24/24  3:54 AM   Result Value Ref Range    Magnesium 2.0 1.6 - 2.6 mg/dL   POCT glucose    Collection Time: 06/24/24  7:44 AM   Result Value Ref Range    POCT Glucose 225 (H) 70 - 110 mg/dL             Jose L Méndez MD

## 2024-06-24 NOTE — SUBJECTIVE & OBJECTIVE
Interval History: No acute events overnight.  States she is breathing comfortably without shortness of breath.  Denies pain today.  Agreeable to SNF at discharge.  All questions answered and patient had no further complaints.    Objective:     Vital Signs (Most Recent):  Temp: 98.7 °F (37.1 °C) (06/24/24 0301)  Pulse: 80 (06/24/24 0732)  Resp: 16 (06/24/24 0732)  BP: (!) 145/63 (06/24/24 0505)  SpO2: 100 % (06/24/24 0732) Vital Signs (24h Range):  Temp:  [98 °F (36.7 °C)-98.7 °F (37.1 °C)] 98.7 °F (37.1 °C)  Pulse:  [76-91] 80  Resp:  [13-27] 16  SpO2:  [89 %-100 %] 100 %  BP: (110-147)/(55-72) 145/63     Weight: 90.7 kg (199 lb 15.3 oz)  Body mass index is 39.05 kg/m².    Intake/Output Summary (Last 24 hours) at 6/24/2024 1028  Last data filed at 6/24/2024 0305  Gross per 24 hour   Intake 1557.38 ml   Output 1300 ml   Net 257.38 ml         Physical Exam  Vitals and nursing note reviewed.   Constitutional:       General: She is not in acute distress.     Appearance: She is well-developed. She is obese. She is ill-appearing (chronically). She is not toxic-appearing or diaphoretic.   HENT:      Head: Normocephalic and atraumatic.      Mouth/Throat:      Mouth: Mucous membranes are moist.   Eyes:      Extraocular Movements: Extraocular movements intact.   Neck:      Trachea: No tracheal deviation.   Cardiovascular:      Rate and Rhythm: Normal rate and regular rhythm.      Heart sounds: Normal heart sounds. No murmur heard.     No gallop.      Comments: Trace LLE edema.     Pulmonary:      Comments: Breathing comfortably on NC O2.  Good air movement without wheezing.  Faint basilar crackles noted  Abdominal:      General: Bowel sounds are normal. There is no distension.      Palpations: Abdomen is soft. There is no mass.      Tenderness: There is no abdominal tenderness. There is no guarding.   Musculoskeletal:         General: Swelling present. Tenderness: left knee.     Cervical back: Normal range of motion.       Left lower leg: Edema present.      Comments: Lets are mildly edematous.  Left knee without myles swelling, warmth or redness and is not tender to superficial palpation   Skin:     General: Skin is warm and dry.      Coloration: Skin is not pale.      Findings: No erythema or rash.   Neurological:      General: No focal deficit present.      Mental Status: She is alert and oriented to person, place, and time.      Comments: Mild diffuse weakness   Psychiatric:         Mood and Affect: Mood normal.             Significant Labs: All pertinent labs within the past 24 hours have been reviewed.    Significant Imaging: I have reviewed all pertinent imaging results/findings within the past 24 hours.

## 2024-06-24 NOTE — NURSING
Pts glucose 65 before dinner. Pts daughter stated that pt didn't eat her lunch pt did receive 20 units of aspart at noon. Nurse received orders from Dr. Lieberman to hold dinner dose of insulin. Pt eating dinner currently and is asymptomatic.

## 2024-06-24 NOTE — ASSESSMENT & PLAN NOTE
-Baseline Cr 1.2-1.5  -On admit Cr 1.9 and has had mild hyperkalemia and pseudohyponatremia due to hyperglycemia  -Cr bumped to 2.2 on 6/21 but is improved to 1.4 today.  -WILLIAM without any evidence of hydronephrosis  -No urine eosinophils.  No significant proteinuria  -Avoid nephrotoxic agents and renally dose meds  -Hold home torsemide and losartan for now.  Continue gentle diuresis as above  -Repeat BMP in AM

## 2024-06-24 NOTE — PLAN OF CARE
POC reviewed. No significant events this shift. Cardiac monitor maintained. BiPAP tolerated throughout night, 3lpm NC noted. PRN pain medication administered, full relief obtained. No significant events during shift UOP 60. Bed at lowest position, wheels locked, call light within reach, No signs of acute distress.

## 2024-06-24 NOTE — ASSESSMENT & PLAN NOTE
-A1c 9.5 6/5/24  -Home regimen includes aspart 14 units tidwm and detemir 20 units qd +18 units qhs  -On admit hyperglycemic with blood sugar 325, but not in DKA  -Treated with IV steroids in ED and developed DKA on 1st night (Blood sugar 901, anion gap 23, BOHB 3.8)  -DKA pathway initiated and she was treated with insulin drip, limited fluids due to pulmonary edema and we monitored accu check q1h and bmp q4h.    -Anion gap has closed and she has been successfully transitioned to subcutaneous insulin.    -Despite higher doses of insulin than at home, blood sugars are still above goal range  -Will increase glargine to 35 units bid and aspart to 20 units tidwm.

## 2024-06-24 NOTE — ASSESSMENT & PLAN NOTE
-Admitted to inpatient status  -Presented with shortness of breath.  In ED had wheezing and treated with continuous nebulizers and steroids.  She was admitted with presumptive COPD exacerbation and overnight developed worsening left knee pain, spike in blood pressure and myles respiratory distress requiring transfer to ICU and treatment with bipap.  -Noted history of former tobacco use, chronic bronchitis, chronic airflow limitation (no pfts available), chronic systolic chf and obesity.  -CXR initially was clear but after spike in blood pressure and respiratory distress the cxr showed new bilateral mixed interstitial and more focal airspace opacities with a bibasilar predominance   -Initial ABG showed a severe respiratory acidosis with pH 7.0 and pCO2 63.  -Flu and covid swabs are negative.  Procalcitonin is elevated.  Lactic acid is elevated  -Echo shows EF 40-50%, normal diastolic function and mild pulmonary hypertension with PASP 45mmHg  -This is acute hypoxic and hypercapnic respiratory failure.  She is not on oxygen at home.  -Pulmonology consulted and input appreciated.  Doubt pulmonary embolism.  More likely flash pulmonary edema due to elevated blood pressure due to pain - all in context of quite poor reserve and chronic illness.  -Cardiology consulted and input appreciated.  Continue with gentle diuresis for now.  -Today she is breathing comfortably on 3L O2.  WBC improving - attributed to steroids received earlier in hospitalization.    -Continue to treat chronic pain as below - well controlled presently.  -Continue gentle diuresis.  -Will continue scheduled duo-nebs for now as well as supplemental oxygen and wean as able.    -Continue bipap QHS.  -Transfer out of ICU today.  -Hold transfer out of ICU for now pending better diabetes control

## 2024-06-24 NOTE — ASSESSMENT & PLAN NOTE
-Noted history and follows with pain management.  -Home med list includes hydrocodone/acetaminophen 10/325 q8h prn, robaxin 500mg tid prn, lyrica 150mg TID  -Complains of left knee pain now but nothing obviously inflamed on my exam  -xray of left knee showed arthritic changes  -Consulted orthopedic surgery and input appreciated.  She underwent steroid injections to each knee on 6/21  -Continue allopurinol, hydrocodone, robaxin as at home  -Continue lidoderm patch to each knee.  Consider resumption of lyrica if she remains stable and has pain  -Consulted PT/OT who recommend SNF at discharge - patient is agreeable

## 2024-06-24 NOTE — ASSESSMENT & PLAN NOTE
-Bp normal initially but spiked overnight 6/20-6/21 to 204/77 at [facility]   -BP today normal to mildly elevated  -Continue clonidine, toprol and nifedipine  -Holding losartan for now given bump in creatinine - likely able to resume soon as Cr is improving.  -Prn hdyralazine available for SBP > 180

## 2024-06-24 NOTE — PROGRESS NOTES
[facility] Intensive Care [facility] Medicine  Progress Note    Patient Name: Indira Waters  MRN: [id number]  Patient Class: IP- Inpatient   Admission Date: 6/20/2024  Length of Stay: 3 days  Attending Physician: Rey Lieberman MD  Primary Care Provider: Chun Zapata MD        Subjective:     Principal Problem:Acute respiratory failure with hypoxia and hypercapnia        HPI:  Ms. Indira Waters is a 79 y.o. female, with PMH of Chronic pain, Chronic airflow limitation, chronic bronchitis, former smoker, CAD, HTN, T2DM, Diabetic polyneuropathy, RA, GERD, MDD, anxiety, fibromyalgia, h/o cervical cancer, obesity, CDK-4, SLE, who [place] [facility] [place] from her PCPs office due to shortness of breath that occurs when it rains outside with associated hypoxia in the office. She states she has felt short of breath all week, and that at baseline she has FERRIS, which is now also occurring at rest and is associated with chest tightness. She further notes onset of lower extremity swelling. She denied fever, chills, cough. She does not utilize home O2. ED workup with labs including a CBC which shows anemia with H&H of 10.4/32.0, but no leukocytosis or left shift. A metabolic panel showed RANJEET with BUN 33, and Cr of 1.9 without hyperkalemia. CO2 was 22, and anion gap was 16, glucose was 382. BNP was 132, without elevation of troponin, and with CTX showing no acute cardiopulmonary process. A D-dimer was not elevated. She was treated in the ED with 3 DuoNebs, one hour long neb, and IV steroids. Initially she was maintaining adequate O2 sats on room air. At about midnight her BP jerri to 204/77, when she was apparently reporting pain, and then at about 01:40 she had an acute decline in O2 sats with worsened shortness of breath. An ABG was ordered and showed CO2 retention, she was placed on BiPap. Orders were changed from tele to ICU. She was placed on observation.     Overview/Hospital Course:  No  notes on file    Interval History: No acute events overnight.  States she is breathing comfortably without shortness of breath.  Denies pain today.  Agreeable to SNF at discharge.  All questions answered and patient had no further complaints.    Objective:     Vital Signs (Most Recent):  Temp: 98.7 °F (37.1 °C) (06/24/24 0301)  Pulse: 80 (06/24/24 0732)  Resp: 16 (06/24/24 0732)  BP: (!) 145/63 (06/24/24 0505)  SpO2: 100 % (06/24/24 0732) Vital Signs (24h Range):  Temp:  [98 °F (36.7 °C)-98.7 °F (37.1 °C)] 98.7 °F (37.1 °C)  Pulse:  [76-91] 80  Resp:  [13-27] 16  SpO2:  [89 %-100 %] 100 %  BP: (110-147)/(55-72) 145/63     Weight: 90.7 kg (199 lb 15.3 oz)  Body mass index is 39.05 kg/m².    Intake/Output Summary (Last 24 hours) at 6/24/2024 1028  Last data filed at 6/24/2024 0305  Gross per 24 hour   Intake 1557.38 ml   Output 1300 ml   Net 257.38 ml         Physical Exam  Vitals and nursing note reviewed.   Constitutional:       General: She is not in acute distress.     Appearance: She is well-developed. She is obese. She is ill-appearing (chronically). She is not toxic-appearing or diaphoretic.   HENT:      Head: Normocephalic and atraumatic.      Mouth/Throat:      Mouth: Mucous membranes are moist.   Eyes:      Extraocular Movements: Extraocular movements intact.   Neck:      Trachea: No tracheal deviation.   Cardiovascular:      Rate and Rhythm: Normal rate and regular rhythm.      Heart sounds: Normal heart sounds. No murmur heard.     No gallop.      Comments: Trace LLE edema.     Pulmonary:      Comments: Breathing comfortably on NC O2.  Good air movement without wheezing.  Faint basilar crackles noted  Abdominal:      General: Bowel sounds are normal. There is no distension.      Palpations: Abdomen is soft. There is no mass.      Tenderness: There is no abdominal tenderness. There is no guarding.   Musculoskeletal:         General: Swelling present. Tenderness: left knee.     Cervical back: Normal range of  motion.      Left lower leg: Edema present.      Comments: Lets are mildly edematous.  Left knee without myles swelling, warmth or redness and is not tender to superficial palpation   Skin:     General: Skin is warm and dry.      Coloration: Skin is not pale.      Findings: No erythema or rash.   Neurological:      General: No focal deficit present.      Mental Status: She is alert and oriented to person, place, and time.      Comments: Mild diffuse weakness   Psychiatric:         Mood and Affect: Mood normal.             Significant Labs: All pertinent labs within the past 24 hours have been reviewed.    Significant Imaging: I have reviewed all pertinent imaging results/findings within the past 24 hours.    Assessment/Plan:      * Acute respiratory failure with hypoxia and hypercapnia  -Admitted to inpatient status  -Presented with shortness of breath.  In ED had wheezing and treated with continuous nebulizers and steroids.  She was admitted with presumptive COPD exacerbation and overnight developed worsening left knee pain, spike in blood pressure and myles respiratory distress requiring transfer to ICU and treatment with bipap.  -Noted history of former tobacco use, chronic bronchitis, chronic airflow limitation (no pfts available), chronic systolic chf and obesity.  -CXR initially was clear but after spike in blood pressure and respiratory distress the cxr showed new bilateral mixed interstitial and more focal airspace opacities with a bibasilar predominance   -Initial ABG showed a severe respiratory acidosis with pH 7.0 and pCO2 63.  -Flu and covid swabs are negative.  Procalcitonin is elevated.  Lactic acid is elevated  -Echo shows EF 40-50%, normal diastolic function and mild pulmonary hypertension with PASP 45mmHg  -This is acute hypoxic and hypercapnic respiratory failure.  She is not on oxygen at home.  -Pulmonology consulted and input appreciated.  Doubt pulmonary embolism.  More likely flash pulmonary  edema due to elevated blood pressure due to pain - all in context of quite poor reserve and chronic illness.  -Cardiology consulted and input appreciated.  Continue with gentle diuresis for now.  -Today she is breathing comfortably on 3L O2.  WBC improving - attributed to steroids received earlier in hospitalization.    -Continue to treat chronic pain as below - well controlled presently.  -Continue gentle diuresis.  -Will continue scheduled duo-nebs for now as well as supplemental oxygen and wean as able.    -Continue bipap QHS.  -Transfer out of ICU today.  -Hold transfer out of ICU for now pending better diabetes control    Acute on chronic combined systolic and diastolic congestive heart failure  -On admit appeared euvolemic.  BNP not significantly elevated and CXR without evidence of pulmonary edema  -Overnight 6/20-6/21 developed respiratory distress associated with elevated blood pressure likely secondary to pain.  Repeat CXR showed pulmonary edema (not present on initial cxr).  -Echo reviewed as above - notable for mildly depressed EF (previously normal) and normal diastolic function  -This is acute on chronic combined systolic and diastolic chf  -Cardiology consulted and input appreciated    -Strict ins/outs and daily weights  -Noted slight bump in creatinine on admit - holding losartan for now but ultimately will need to be resumed  -Initially had difficult to manage fluid balance - was volume overloaded but with DKA.  DKA has resolved and diuresis resumed last night.  Continue gentle diuresis for now.  -Continue blood pressure control with clonidine, toprol and nifedipine.    Essential hypertension  -Bp normal initially but spiked overnight 6/20-6/21 to 204/77 at [facility]   -BP today normal to mildly elevated  -Continue clonidine, toprol and nifedipine  -Holding losartan for now given bump in creatinine - likely able to resume soon as Cr is improving.  -Prn hdyralazine available for SBP > 180    Diabetic  ketoacidosis without coma associated with type 2 diabetes mellitus  -A1c 9.5 6/5/24  -Home regimen includes aspart 14 units tidwm and detemir 20 units qd +18 units qhs  -On admit hyperglycemic with blood sugar 325, but not in DKA  -Treated with IV steroids in ED and developed DKA on 1st night (Blood sugar 901, anion gap 23, BOHB 3.8)  -DKA pathway initiated and she was treated with insulin drip, limited fluids due to pulmonary edema and we monitored accu check q1h and bmp q4h.    -Anion gap has closed and she has been successfully transitioned to subcutaneous insulin.    -Despite higher doses of insulin than at home, blood sugars are still above goal range  -Will increase glargine to 35 units bid and aspart to 20 units tidwm.      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 pectoris  -Noted history  -Denies chest pain  -Echo without any notable segmental wall motion abnormalities  -Continue aspirin, toprol and statin     Chronic pain syndrome  -Noted history and follows with pain management.  -Home med list includes hydrocodone/acetaminophen 10/325 q8h prn, robaxin 500mg tid prn, lyrica 150mg TID  -Complains of left knee pain now but nothing obviously inflamed on my exam  -xray of left knee showed arthritic changes  -Consulted orthopedic surgery and input appreciated.  She underwent steroid injections to each knee on 6/21  -Continue allopurinol, hydrocodone, robaxin as at home  -Continue lidoderm patch to each knee.  Consider resumption of lyrica if she remains stable and has pain  -Consulted PT/OT who recommend SNF at discharge - patient is agreeable    Diabetic polyneuropathy  -Takes lyrica at home - holding for now to ensure stability of respiratory status    Stage 4 chronic kidney disease  -Baseline Cr 1.2-1.5  -On admit Cr 1.9 and has had mild hyperkalemia and pseudohyponatremia due to hyperglycemia  -Cr bumped to 2.2 on 6/21 but is improved to 1.4 today.  -WILLIAM without any  evidence of hydronephrosis  -No urine eosinophils.  No significant proteinuria  -Avoid nephrotoxic agents and renally dose meds  -Hold home torsemide and losartan for now.  Continue gentle diuresis as above  -Repeat BMP in AM    Systemic lupus erythematosus, unspecified  -History noted but no specific medications on home med list  -Patient reports she was diagnosed with SLE and RA many years ago in [place].  She is unsure if she was ever on any specific medications for these.  -Chart reviewed and last seen by Dr. 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  STEVE from 2022 was negative  -I doubt she actually has SLE or RA.    Rheumatoid arthritis, unspecified  -History noted in chart, but unclear if truly has RA  -RF and CCP negative 2022  -Refiewed last rheumatology note from 2022 and no evidence of connective tissue disorder or RA at that time.    -I doubt she actually has SLE or RA    Hemiplegia and hemiparesis following cerebral infarction affecting left non-dominant side  -History noted  -Fall precautions ordered  -Consulted PT/OT - recommend SNF - patient agreeable    Class 2 obesity with body mass index (BMI) of 39.0 to 39.9 in adult  Body mass index is 39.05 kg/m². Morbid obesity complicates all aspects of disease management from diagnostic modalities to treatment. Weight loss encouraged and health benefits explained to patient.    Noncompliance with medication regimen  -History noted       VTE Risk Mitigation (From admission, onward)           Ordered     heparin (porcine) injection 5,000 Units  Every 8 hours (non-standard times)         06/21/24 1008     IP VTE HIGH RISK PATIENT  Once         06/21/24 1008     Place SUHAS hose  Until discontinued         06/21/24 1008     Place sequential  compression device  Until discontinued         06/21/24 1008     Place sequential compression device  Until discontinued         06/21/24 0010     Place sequential compression device  Until discontinued         06/20/24 2992                    Discharge Planning   KYLE:      Code Status: Full Code   Is the patient medically ready for discharge?:     Reason for patient still in hospital (select all that apply): Treatment  Discharge Plan A: Home Health                  Rey Lieberman MD  Department of Hospital Medicine   [facility] - Intensive [facility] ([place])

## 2024-06-24 NOTE — ASSESSMENT & PLAN NOTE
-On admit appeared euvolemic.  BNP not significantly elevated and CXR without evidence of pulmonary edema  -Overnight 6/20-6/21 developed respiratory distress associated with elevated blood pressure likely secondary to pain.  Repeat CXR showed pulmonary edema (not present on initial cxr).  -Echo reviewed as above - notable for mildly depressed EF (previously normal) and normal diastolic function  -This is acute on chronic combined systolic and diastolic chf  -Cardiology consulted and input appreciated    -Strict ins/outs and daily weights  -Noted slight bump in creatinine on admit - holding losartan for now but ultimately will need to be resumed  -Initially had difficult to manage fluid balance - was volume overloaded but with DKA.  DKA has resolved and diuresis resumed last night.  Continue gentle diuresis for now.  -Continue blood pressure control with clonidine, toprol and nifedipine.

## 2024-06-24 NOTE — PLAN OF CARE
CM spoke to Pt and daughter about SNF facilities. Pt states she wants to go home, daughter states they had not thought about SNF, they both are leaning toward home with HH. I reiterated the need for more therapy than HH provides.    Daughter and pt to discuss and will call CM.   06/24/24 1482   Discharge Reassessment   Assessment Type Discharge Planning Reassessment   Did the patient's condition or plan change since previous assessment? No   Discharge Plan discussed with: Patient;Adult children   Communicated KYLE with patient/caregiver Yes   Discharge Plan A Skilled Nursing Facility   Discharge Plan B Home Health   DME Needed Upon Discharge  none   Transition of Care Barriers None   Why the patient remains in the hospital Requires continued medical care   Post-Acute Status   Post-Acute Authorization Placement;Home Health   Discharge Delays None known at this time

## 2024-06-24 NOTE — PT/OT/SLP PROGRESS
Physical Therapy Treatment    Patient Name:  Indira Waters   MRN:  [id number]    Recommendations:     Discharge Recommendations: Moderate Intensity Therapy  Discharge Equipment Recommendations: to be determined by next level of care  Barriers to discharge: Inaccessible home and Decreased caregiver support    Assessment:     Indira Waters is a 79 y.o. female admitted with a medical diagnosis of Acute respiratory failure with hypoxia and hypercapnia.  She presents with the following impairments/functional limitations: weakness, gait instability, pain, edema, impaired balance, impaired cardiopulmonary response to activity, impaired cognition, impaired coordination, impaired endurance, impaired functional mobility, impaired self care skills, impaired sensation, decreased coordination, decreased lower extremity function, decreased safety awareness.    Supine>sit with Indigo / HHA  Sit>stand from high ICU bed with RW and CGA, pt sliding down until feet reach floor  Toilet transfer with RW and CGA, step transfer  Amb 12' + 18' with RW and CGA, 3L O2, pt with decreased gait speed, alonso and B step length  Pt feeling better today, no c/o pain, pt performed limited ambulation in room without SoB  Rec Moderate Intensity Therapy    Rehab Prognosis: Good; patient would benefit from acute skilled PT services to address these deficits and reach maximum level of function.    Recent Surgery: * No surgery found *      Plan:     During this hospitalization, patient to be seen 5 x/week to address the identified rehab impairments via gait training, therapeutic activities, therapeutic exercises, neuromuscular re-education and progress toward the following goals:    Plan of Care Expires:  07/23/24    Subjective     Chief Complaint: none stated  Patient/Family Comments/goals: I try to stay out of bed as much as I can  Pain/Comfort:  Pain Rating 1: 0/10  Pain Rating Post-Intervention 1: 0/10      Objective:     Communicated with  nurse Baca prior to session.  Patient found HOB elevated with blood pressure cuff, oxygen, PureWick, peripheral IV, pulse ox (continuous), telemetry upon PT entry to room.     General Precautions: Standard, fall, diabetic, NPO  Orthopedic Precautions: N/A  Braces: N/A  Respiratory Status: Nasal cannula, flow 3 L/min     Functional Mobility:  Bed Mobility:     Supine to Sit: minimum assistance  Transfers:     Sit to Stand:  contact guard assistance with rolling walker  Toilet Transfer: contact guard assistance with  rolling walker  using  Step Transfer  Gait: 12' + 18' with RW and CGA, 3L O2, pt with decreased gait speed, alonso and B step length      AM-PAC 6 CLICK MOBILITY  Turning over in bed (including adjusting bedclothes, sheets and blankets)?: 3  Sitting down on and standing up from a chair with arms (e.g., wheelchair, bedside commode, etc.): 3  Moving from lying on back to sitting on the side of the bed?: 3  Moving to and from a bed to a chair (including a wheelchair)?: 3  Need to walk in hospital room?: 3  Climbing 3-5 steps with a railing?: 2  Basic Mobility Total Score: 17       Treatment & Education:  Supine therex: AP, heel slides x 10  Gait and transfer training with RW as noted    Patient left up in chair with all lines intact, call button in reach, and nurse Baca notified..    GOALS:   Multidisciplinary Problems       Physical Therapy Goals          Problem: Physical Therapy    Goal Priority Disciplines Outcome Goal Variances Interventions   Physical Therapy Goal     PT, PT/OT Progressing     Description: Goals to be met by: 2024    Patient will increase functional independence with mobility by performin. Sit<>stand with Min A with RW.  2. Gait x 40 feet with RW with CGA.  3. Supine<>sit with CGA.                           Time Tracking:     PT Received On: 24  PT Start Time: 844     PT Stop Time: 914  PT Total Time (min): 30 min     Billable Minutes: Gait Training 15 and  Therapeutic Activity 15    Treatment Type: Treatment  PT/PTA: PTA     Number of PTA visits since last PT visit: 1 06/24/2024

## 2024-06-24 NOTE — PT/OT/SLP PROGRESS
"Occupational Therapy   Treatment    Name: Indira Waters  MRN: [id number]  Admitting Diagnosis:  Acute respiratory failure with hypoxia and hypercapnia       Recommendations:     Discharge Recommendations: Moderate Intensity Therapy (Pt and daughter reporting that they do not want pt to go to a facility for continued therapy and want pt to go home with Home Health OT and PT and assistance from pt's 2 daughters.)  Discharge Equipment Recommendations:  to be determined by next level of care, walker, rolling, bedside commode  Barriers to discharge:  Inaccessible home environment (Current functional level)    Assessment:     Indira Waters is a 79 y.o. female with a medical diagnosis of Acute respiratory failure with hypoxia and hypercapnia.  She presents with the following performance deficits affecting function are weakness, impaired endurance, impaired self care skills, impaired functional mobility, gait instability, impaired balance, decreased lower extremity function, impaired cardiopulmonary response to activity, pain, decreased safety awareness, edema, decreased upper extremity function, decreased coordination, decreased ROM, impaired sensation.     Rehab Prognosis:   Fair/Good to return to [facility] ; patient would benefit from acute skilled OT services to address these deficits and reach maximum level of function.       Plan:     Patient to be seen 4 x/week to address the above listed problems via self-care/home management, therapeutic activities, therapeutic exercises  Plan of Care Expires: 07/07/24  Plan of Care Reviewed with: patient    Subjective     Chief Complaint: 10/10 pain after standing at sink for 5 minutes while performing grooming tasks  Patient/Family Comments/goals: Pt's daughter reporting pt usually gets Norco three times a day at home and pt only had it once this morning.   Pain/Comfort:  Pain Rating 1:  (Back pain which increased to "10/10" when getting back into bed  after standing at " sink for 5 minutes while performing two grooming tasks. Pt able to perform bed mobility though stating 10/10 pain. Pt use of call button to notify nursing staff of pain)    Objective:     Communicated with: SHERINE Serrano prior to session.  Patient found HOB elevated with oxygen, peripheral IV, PureWick, bed alarm (Daughter in room) upon OT entry to room.    General Precautions: Standard, diabetic, fall    Orthopedic Precautions:N/A  Braces: N/A  Respiratory Status: Nasal cannula, flow 3 L/min     Occupational Performance:     Bed Mobility:    Supine to sit: SBA with HOB elevated and pt using bed rail, extended time to perform   Sit to supine: Mod A  Scooting to HOB: SBA with use of bed rails for pulling with bilateral UE and bridging, bed flat    Functional Mobility/Transfers:  Sit to stand: CGA with RW  Standing at sink: SBA with RW for 5 minutes then back hurting and fatigued  Functional Mobility: CGA with RW    Activities of Daily Living:  Grooming: SBA standing at sink for oral care after set up of items on countertop and SBA washing face.  After performing 2 grooming tasks, pt reporting her back was starting to hurt and she couldn't stand any longer.  Pt walked back to bed at CGA with RW.  Pt declined brushing her hair once back in bed with FOB elevated slightly and HOB elevated.        [facility] 6 Click ADL: 17    Treatment & Education:  Role of OT, POC, ADL and ADL mobiity training, safety, bed mobility, discharge recs     Patient left  supported upright sitting in bed  with all lines intact, call button in reach, bed alarm on, and daughter present.   Pt had used call button to notify nursing staff she would like pain medication due to back pain.    GOALS:   Multidisciplinary Problems       Occupational Therapy Goals          Problem: Occupational Therapy    Goal Priority Disciplines Outcome Interventions   Occupational Therapy Goal     OT, PT/OT Progressing    Description: Goals to be met by: 7/7/2024     Patient  will increase functional independence with ADLs by performing:    UE Dressing with Supervision.  LE Dressing with Stand-by Assistance and Assistive Devices as needed.  Grooming while standing at sink with Stand-by Assistance.  Toileting from toilet with Supervision for hygiene and clothing management.   Toilet transfer to toilet with Supervision.                         Time Tracking:     OT Date of Treatment: 06/24/24  OT Start Time: 1438  OT Stop Time: 1458  OT Total Time (min): 20 min    Billable Minutes:Therapeutic Activity 20    OT/AMANDA: OT          6/24/2024

## 2024-06-24 NOTE — HOSPITAL COURSE
Patient is a 79-year-old woman with chronic pain syndrome, fibromyalgia, chronic bronchitis, former tobacco user, coronary artery disease, hypertension, gastroesophageal reflux disease, major depressive disorder, and chronic kidney disease stage 4 admitted to the hospital with acute respiratory distress.  Patient initially treated for possible acute exacerbation of chronic obstructive pulmonary disease.  Patient failed to improve with treatment for chronic obstructive pulmonary disease. Patient decompensated on the floor was transferred to intensive care unit for rescue BiPAP.    Following further evaluation of his clear that she had flash pulmonary edema from decompensated heart failure with elevated blood pressure.  Patient treated with intravenous diuretic therapy and afterload reduction with marked improvement in her respiratory distress.  Patient improved and weaned off BiPAP.  Patient's supplemental oxygen requirements have also decreased.  Patient subsequently transferred to the floor.      Patient has continued to improve with medical therapy to treat heart failure.  Heart failure regimen adjusted as per cardiology's recommendations.  Kidney function stable.  Patient is quite debilitated from recent acute illness.  Patient also lives on a 2nd floor apartment and with significant difficulty in doing deficit this time.  Further inpatient rehabilitation recommended.  Patient accepted to [facility] inpatient rehab facility for aggressive physical therapy with goal of return patient to her prior functional status.  Efforts of weaning her oxygen off further also can be done at the inpatient rehab facility.    Close outpatient follow up with Dr. Marlon Méndez for ongoing management of heart failure advised after she is discharged from inpatient rehab facility.  Also recommend patient follow up with her primary care physician for ongoing management of her other medical comorbidities.

## 2024-06-25 PROBLEM — M06.9 RHEUMATOID ARTHRITIS, UNSPECIFIED: Status: RESOLVED | Noted: 2021-11-10 | Resolved: 2024-06-25

## 2024-06-25 PROBLEM — Z91.148 NONCOMPLIANCE WITH MEDICATION REGIMEN: Status: RESOLVED | Noted: 2022-07-13 | Resolved: 2024-06-25

## 2024-06-25 PROBLEM — M32.9 SYSTEMIC LUPUS ERYTHEMATOSUS, UNSPECIFIED: Status: RESOLVED | Noted: 2021-09-15 | Resolved: 2024-06-25

## 2024-06-25 LAB
ALBUMIN SERPL BCP-MCNC: 2.5 G/DL (ref 3.5–5.2)
ALP SERPL-CCNC: 62 U/L (ref 55–135)
ALT SERPL W/O P-5'-P-CCNC: 18 U/L (ref 10–44)
ANION GAP SERPL CALC-SCNC: 11 MMOL/L (ref 8–16)
AST SERPL-CCNC: 20 U/L (ref 10–40)
BASOPHILS # BLD AUTO: 0.03 K/UL (ref 0–0.2)
BASOPHILS NFR BLD: 0.2 % (ref 0–1.9)
BILIRUB SERPL-MCNC: 0.3 MG/DL (ref 0.1–1)
BUN SERPL-MCNC: 21 MG/DL (ref 8–23)
CALCIUM SERPL-MCNC: 9.1 MG/DL (ref 8.7–10.5)
CHLORIDE SERPL-SCNC: 100 MMOL/L (ref 95–110)
CO2 SERPL-SCNC: 26 MMOL/L (ref 23–29)
CREAT SERPL-MCNC: 1 MG/DL (ref 0.5–1.4)
DIFFERENTIAL METHOD BLD: ABNORMAL
EOSINOPHIL # BLD AUTO: 0.3 K/UL (ref 0–0.5)
EOSINOPHIL NFR BLD: 1.7 % (ref 0–8)
ERYTHROCYTE [DISTWIDTH] IN BLOOD BY AUTOMATED COUNT: 15.4 % (ref 11.5–14.5)
EST. GFR  (NO RACE VARIABLE): 58 ML/MIN/1.73 M^2
GLUCOSE SERPL-MCNC: 148 MG/DL (ref 70–110)
HCT VFR BLD AUTO: 30.4 % (ref 37–48.5)
HGB BLD-MCNC: 9.9 G/DL (ref 12–16)
IMM GRANULOCYTES # BLD AUTO: 0.1 K/UL (ref 0–0.04)
IMM GRANULOCYTES NFR BLD AUTO: 0.6 % (ref 0–0.5)
LYMPHOCYTES # BLD AUTO: 2 K/UL (ref 1–4.8)
LYMPHOCYTES NFR BLD: 12.7 % (ref 18–48)
MAGNESIUM SERPL-MCNC: 1.8 MG/DL (ref 1.6–2.6)
MCH RBC QN AUTO: 28.1 PG (ref 27–31)
MCHC RBC AUTO-ENTMCNC: 32.6 G/DL (ref 32–36)
MCV RBC AUTO: 86 FL (ref 82–98)
MONOCYTES # BLD AUTO: 1.1 K/UL (ref 0.3–1)
MONOCYTES NFR BLD: 7 % (ref 4–15)
NEUTROPHILS # BLD AUTO: 12.1 K/UL (ref 1.8–7.7)
NEUTROPHILS NFR BLD: 77.8 % (ref 38–73)
NRBC BLD-RTO: 0 /100 WBC
PHOSPHATE SERPL-MCNC: 3.7 MG/DL (ref 2.7–4.5)
PLATELET # BLD AUTO: 267 K/UL (ref 150–450)
PMV BLD AUTO: 11.3 FL (ref 9.2–12.9)
POCT GLUCOSE: 140 MG/DL (ref 70–110)
POCT GLUCOSE: 235 MG/DL (ref 70–110)
POCT GLUCOSE: 75 MG/DL (ref 70–110)
POCT GLUCOSE: 78 MG/DL (ref 70–110)
POCT GLUCOSE: 92 MG/DL (ref 70–110)
POTASSIUM SERPL-SCNC: 3.4 MMOL/L (ref 3.5–5.1)
PROCALCITONIN SERPL IA-MCNC: 0.78 NG/ML
PROT SERPL-MCNC: 6.6 G/DL (ref 6–8.4)
RBC # BLD AUTO: 3.52 M/UL (ref 4–5.4)
SODIUM SERPL-SCNC: 137 MMOL/L (ref 136–145)
WBC # BLD AUTO: 15.62 K/UL (ref 3.9–12.7)

## 2024-06-25 PROCEDURE — 25000003 PHARM REV CODE 250: Performed by: PHYSICIAN ASSISTANT

## 2024-06-25 PROCEDURE — A4216 STERILE WATER/SALINE, 10 ML: HCPCS | Performed by: PHYSICIAN ASSISTANT

## 2024-06-25 PROCEDURE — A4216 STERILE WATER/SALINE, 10 ML: HCPCS | Performed by: HOSPITALIST

## 2024-06-25 PROCEDURE — 94761 N-INVAS EAR/PLS OXIMETRY MLT: CPT

## 2024-06-25 PROCEDURE — 25000003 PHARM REV CODE 250: Performed by: INTERNAL MEDICINE

## 2024-06-25 PROCEDURE — 97530 THERAPEUTIC ACTIVITIES: CPT | Mod: CQ

## 2024-06-25 PROCEDURE — 99233 SBSQ HOSP IP/OBS HIGH 50: CPT | Mod: ,,, | Performed by: INTERNAL MEDICINE

## 2024-06-25 PROCEDURE — 63600175 PHARM REV CODE 636 W HCPCS: Performed by: INTERNAL MEDICINE

## 2024-06-25 PROCEDURE — 80053 COMPREHEN METABOLIC PANEL: CPT | Performed by: HOSPITALIST

## 2024-06-25 PROCEDURE — 97535 SELF CARE MNGMENT TRAINING: CPT | Mod: CO

## 2024-06-25 PROCEDURE — 25000003 PHARM REV CODE 250: Performed by: HOSPITALIST

## 2024-06-25 PROCEDURE — 85025 COMPLETE CBC W/AUTO DIFF WBC: CPT | Performed by: HOSPITALIST

## 2024-06-25 PROCEDURE — 94640 AIRWAY INHALATION TREATMENT: CPT

## 2024-06-25 PROCEDURE — 36415 COLL VENOUS BLD VENIPUNCTURE: CPT | Performed by: HOSPITALIST

## 2024-06-25 PROCEDURE — 21400001 HC TELEMETRY ROOM

## 2024-06-25 PROCEDURE — 25000242 PHARM REV CODE 250 ALT 637 W/ HCPCS: Performed by: PHYSICIAN ASSISTANT

## 2024-06-25 PROCEDURE — 27000221 HC OXYGEN, UP TO 24 HOURS

## 2024-06-25 PROCEDURE — 84100 ASSAY OF PHOSPHORUS: CPT | Performed by: HOSPITALIST

## 2024-06-25 PROCEDURE — 63600175 PHARM REV CODE 636 W HCPCS: Performed by: HOSPITALIST

## 2024-06-25 PROCEDURE — 99900035 HC TECH TIME PER 15 MIN (STAT)

## 2024-06-25 PROCEDURE — 97116 GAIT TRAINING THERAPY: CPT | Mod: CQ

## 2024-06-25 PROCEDURE — 25000242 PHARM REV CODE 250 ALT 637 W/ HCPCS: Performed by: HOSPITALIST

## 2024-06-25 PROCEDURE — 83735 ASSAY OF MAGNESIUM: CPT | Performed by: HOSPITALIST

## 2024-06-25 PROCEDURE — 84145 PROCALCITONIN (PCT): CPT | Performed by: HOSPITALIST

## 2024-06-25 RX ORDER — INSULIN ASPART 100 [IU]/ML
15 INJECTION, SOLUTION INTRAVENOUS; SUBCUTANEOUS
Status: DISCONTINUED | OUTPATIENT
Start: 2024-06-25 | End: 2024-06-26

## 2024-06-25 RX ORDER — IBUPROFEN 200 MG
24 TABLET ORAL
Status: DISCONTINUED | OUTPATIENT
Start: 2024-06-25 | End: 2024-06-26 | Stop reason: HOSPADM

## 2024-06-25 RX ORDER — LOSARTAN POTASSIUM 50 MG/1
50 TABLET ORAL EVERY 12 HOURS
Status: DISCONTINUED | OUTPATIENT
Start: 2024-06-25 | End: 2024-06-26 | Stop reason: HOSPADM

## 2024-06-25 RX ORDER — OXYCODONE HYDROCHLORIDE 5 MG/1
5 TABLET ORAL EVERY 4 HOURS PRN
Status: DISCONTINUED | OUTPATIENT
Start: 2024-06-25 | End: 2024-06-26 | Stop reason: HOSPADM

## 2024-06-25 RX ORDER — ACETAMINOPHEN 500 MG
1000 TABLET ORAL EVERY 8 HOURS PRN
Status: DISCONTINUED | OUTPATIENT
Start: 2024-06-25 | End: 2024-06-26 | Stop reason: HOSPADM

## 2024-06-25 RX ORDER — INSULIN GLARGINE 100 [IU]/ML
30 INJECTION, SOLUTION SUBCUTANEOUS 2 TIMES DAILY
Status: DISCONTINUED | OUTPATIENT
Start: 2024-06-25 | End: 2024-06-26

## 2024-06-25 RX ORDER — INSULIN ASPART 100 [IU]/ML
0-5 INJECTION, SOLUTION INTRAVENOUS; SUBCUTANEOUS
Status: DISCONTINUED | OUTPATIENT
Start: 2024-06-25 | End: 2024-06-26 | Stop reason: HOSPADM

## 2024-06-25 RX ORDER — GLUCAGON 1 MG
1 KIT INJECTION
Status: DISCONTINUED | OUTPATIENT
Start: 2024-06-25 | End: 2024-06-26 | Stop reason: HOSPADM

## 2024-06-25 RX ORDER — PREGABALIN 75 MG/1
75 CAPSULE ORAL 3 TIMES DAILY
Status: DISCONTINUED | OUTPATIENT
Start: 2024-06-25 | End: 2024-06-26 | Stop reason: HOSPADM

## 2024-06-25 RX ORDER — FUROSEMIDE 20 MG/1
40 TABLET ORAL DAILY
Status: DISCONTINUED | OUTPATIENT
Start: 2024-06-26 | End: 2024-06-26 | Stop reason: HOSPADM

## 2024-06-25 RX ORDER — IBUPROFEN 200 MG
16 TABLET ORAL
Status: DISCONTINUED | OUTPATIENT
Start: 2024-06-25 | End: 2024-06-26 | Stop reason: HOSPADM

## 2024-06-25 RX ADMIN — OXYCODONE HYDROCHLORIDE 5 MG: 5 TABLET ORAL at 08:06

## 2024-06-25 RX ADMIN — MUPIROCIN: 20 OINTMENT TOPICAL at 10:06

## 2024-06-25 RX ADMIN — HEPARIN SODIUM 5000 UNITS: 5000 INJECTION INTRAVENOUS; SUBCUTANEOUS at 11:06

## 2024-06-25 RX ADMIN — PREGABALIN 75 MG: 75 CAPSULE ORAL at 10:06

## 2024-06-25 RX ADMIN — IPRATROPIUM BROMIDE AND ALBUTEROL SULFATE 3 ML: 2.5; .5 SOLUTION RESPIRATORY (INHALATION) at 11:06

## 2024-06-25 RX ADMIN — MICONAZOLE NITRATE: 20 OINTMENT TOPICAL at 08:06

## 2024-06-25 RX ADMIN — METOCLOPRAMIDE 5 MG: 5 TABLET ORAL at 11:06

## 2024-06-25 RX ADMIN — FUROSEMIDE 20 MG: 10 INJECTION, SOLUTION INTRAMUSCULAR; INTRAVENOUS at 08:06

## 2024-06-25 RX ADMIN — ALLOPURINOL 300 MG: 300 TABLET ORAL at 09:06

## 2024-06-25 RX ADMIN — CLONIDINE HYDROCHLORIDE 0.1 MG: 0.1 TABLET ORAL at 10:06

## 2024-06-25 RX ADMIN — HEPARIN SODIUM 5000 UNITS: 5000 INJECTION INTRAVENOUS; SUBCUTANEOUS at 06:06

## 2024-06-25 RX ADMIN — INSULIN ASPART 4 UNITS: 100 INJECTION, SOLUTION INTRAVENOUS; SUBCUTANEOUS at 12:06

## 2024-06-25 RX ADMIN — CLONIDINE HYDROCHLORIDE 0.1 MG: 0.1 TABLET ORAL at 08:06

## 2024-06-25 RX ADMIN — IPRATROPIUM BROMIDE AND ALBUTEROL SULFATE 3 ML: 2.5; .5 SOLUTION RESPIRATORY (INHALATION) at 07:06

## 2024-06-25 RX ADMIN — INSULIN GLARGINE 35 UNITS: 100 INJECTION, SOLUTION SUBCUTANEOUS at 08:06

## 2024-06-25 RX ADMIN — METOCLOPRAMIDE 5 MG: 5 TABLET ORAL at 05:06

## 2024-06-25 RX ADMIN — METOCLOPRAMIDE 5 MG: 5 TABLET ORAL at 10:06

## 2024-06-25 RX ADMIN — OXYCODONE HYDROCHLORIDE 5 MG: 5 TABLET ORAL at 04:06

## 2024-06-25 RX ADMIN — METOPROLOL SUCCINATE 100 MG: 50 TABLET, EXTENDED RELEASE ORAL at 08:06

## 2024-06-25 RX ADMIN — HYDROCODONE BITARTRATE AND ACETAMINOPHEN 1 TABLET: 10; 325 TABLET ORAL at 08:06

## 2024-06-25 RX ADMIN — INSULIN ASPART 15 UNITS: 100 INJECTION, SOLUTION INTRAVENOUS; SUBCUTANEOUS at 12:06

## 2024-06-25 RX ADMIN — Medication 10 ML: at 04:06

## 2024-06-25 RX ADMIN — ASPIRIN 81 MG: 81 TABLET, COATED ORAL at 08:06

## 2024-06-25 RX ADMIN — FUROSEMIDE 20 MG: 10 INJECTION, SOLUTION INTRAMUSCULAR; INTRAVENOUS at 05:06

## 2024-06-25 RX ADMIN — HEPARIN SODIUM 5000 UNITS: 5000 INJECTION INTRAVENOUS; SUBCUTANEOUS at 03:06

## 2024-06-25 RX ADMIN — SERTRALINE HYDROCHLORIDE 100 MG: 50 TABLET ORAL at 08:06

## 2024-06-25 RX ADMIN — METOCLOPRAMIDE 5 MG: 5 TABLET ORAL at 04:06

## 2024-06-25 RX ADMIN — LOSARTAN POTASSIUM 50 MG: 50 TABLET, FILM COATED ORAL at 10:06

## 2024-06-25 RX ADMIN — INSULIN ASPART 15 UNITS: 100 INJECTION, SOLUTION INTRAVENOUS; SUBCUTANEOUS at 05:06

## 2024-06-25 RX ADMIN — METOPROLOL SUCCINATE 100 MG: 50 TABLET, EXTENDED RELEASE ORAL at 10:06

## 2024-06-25 RX ADMIN — LIDOCAINE 5% 2 PATCH: 700 PATCH TOPICAL at 08:06

## 2024-06-25 RX ADMIN — PREGABALIN 75 MG: 75 CAPSULE ORAL at 04:06

## 2024-06-25 RX ADMIN — NIFEDIPINE 30 MG: 30 TABLET, FILM COATED, EXTENDED RELEASE ORAL at 10:06

## 2024-06-25 RX ADMIN — HYDROCODONE BITARTRATE AND ACETAMINOPHEN 1 TABLET: 10; 325 TABLET ORAL at 12:06

## 2024-06-25 RX ADMIN — NIFEDIPINE 30 MG: 30 TABLET, FILM COATED, EXTENDED RELEASE ORAL at 08:06

## 2024-06-25 RX ADMIN — METHOCARBAMOL 500 MG: 500 TABLET ORAL at 02:06

## 2024-06-25 RX ADMIN — METHOCARBAMOL 500 MG: 500 TABLET ORAL at 10:06

## 2024-06-25 RX ADMIN — IPRATROPIUM BROMIDE AND ALBUTEROL SULFATE 3 ML: 2.5; .5 SOLUTION RESPIRATORY (INHALATION) at 03:06

## 2024-06-25 RX ADMIN — Medication 10 ML: at 10:06

## 2024-06-25 RX ADMIN — MUPIROCIN: 20 OINTMENT TOPICAL at 08:06

## 2024-06-25 RX ADMIN — MICONAZOLE NITRATE: 20 OINTMENT TOPICAL at 10:06

## 2024-06-25 RX ADMIN — FLUTICASONE PROPIONATE 50 MCG: 50 SPRAY, METERED NASAL at 08:06

## 2024-06-25 RX ADMIN — IPRATROPIUM BROMIDE AND ALBUTEROL SULFATE 3 ML: 2.5; .5 SOLUTION RESPIRATORY (INHALATION) at 04:06

## 2024-06-25 RX ADMIN — Medication 10 ML: at 05:06

## 2024-06-25 NOTE — ASSESSMENT & PLAN NOTE
Due to heart failure.  Improving with treating heart failure.  Continue efforts at achieving euvolemia.  Wean supplemental oxygen as tolerated.

## 2024-06-25 NOTE — SUBJECTIVE & OBJECTIVE
Interval History: Breathing improving.    Review of Systems   Constitutional:  Negative for chills and fever.   Respiratory:  Positive for shortness of breath.    Cardiovascular:  Negative for chest pain.   Gastrointestinal:  Negative for abdominal pain, nausea and vomiting.   Genitourinary:  Negative for dysuria and frequency.     Objective:     Vital Signs (Most Recent):  Temp: 97.5 °F (36.4 °C) (06/25/24 1654)  Pulse: 80 (06/25/24 1654)  Resp: 14 (06/25/24 1654)  BP: (!) 148/93 (06/25/24 1654)  SpO2: 99 % (06/25/24 1654) Vital Signs (24h Range):  Temp:  [97.2 °F (36.2 °C)-98.8 °F (37.1 °C)] 97.5 °F (36.4 °C)  Pulse:  [73-81] 80  Resp:  [11-18] 14  SpO2:  [94 %-99 %] 99 %  BP: (103-156)/(52-97) 148/93     Weight: 91.5 kg (201 lb 11.5 oz)  Body mass index is 39.4 kg/m².    Intake/Output Summary (Last 24 hours) at 6/25/2024 1727  Last data filed at 6/25/2024 1100  Gross per 24 hour   Intake 490 ml   Output 1250 ml   Net -760 ml         Physical Exam  Constitutional:       General: She is not in acute distress.  HENT:      Head: Atraumatic.   Eyes:      Conjunctiva/sclera: Conjunctivae normal.   Cardiovascular:      Rate and Rhythm: Normal rate and regular rhythm.      Heart sounds: Normal heart sounds. No murmur heard.  Pulmonary:      Effort: Pulmonary effort is normal.      Breath sounds: Rales present. No wheezing.      Comments: Moving air fairly well  Abdominal:      General: Bowel sounds are normal. There is no distension.      Palpations: Abdomen is soft.      Tenderness: There is no abdominal tenderness.   Musculoskeletal:         General: No deformity. Normal range of motion.      Cervical back: Neck supple.      Right lower leg: Edema present.      Left lower leg: Edema present.   Neurological:      Mental Status: She is alert and oriented to person, place, and time.             Significant Labs: All pertinent labs within the past 24 hours have been reviewed.    Significant Imaging: I have reviewed all  pertinent imaging results/findings within the past 24 hours.

## 2024-06-25 NOTE — PROGRESS NOTES
[facility]    Admission date:  6/20/2024     Assessment    Acute on chronic combined systolic and diastolic heart failure   Diuresis resuming     Coronary atherosclerosis   No angina     Hypertension  Improving control    Plan and Discussion    Change to oral Lasix.  Continue antihypertensive titration.  Renal function looks stable to resume ARB.    Subjective    Feels well.  Has not been out of bed yet.    Medications  Current Facility-Administered Medications   Medication Dose Route Frequency Provider Last Rate Last Admin    acetaminophen tablet 650 mg  650 mg Oral Q6H PRN Rey Lieberman MD   650 mg at 06/21/24 0043    albuterol-ipratropium 2.5 mg-0.5 mg/3 mL nebulizer solution 3 mL  3 mL Nebulization Q4H PRN Rey Lieberman MD        albuterol-ipratropium 2.5 mg-0.5 mg/3 mL nebulizer solution 3 mL  3 mL Nebulization Q4H Rey Lieberman MD   3 mL at 06/25/24 1135    allopurinoL tablet 300 mg  300 mg Oral Daily Marifer Boudreaux PA-C   300 mg at 06/25/24 0900    aspirin EC tablet 81 mg  81 mg Oral Daily Marifer Boudreaux PA-C   81 mg at 06/25/24 0843    cloNIDine tablet 0.1 mg  0.1 mg Oral BID Jose L Méndez MD   0.1 mg at 06/25/24 0843    dextrose 10% bolus 125 mL 125 mL  12.5 g Intravenous PRN Rey Lieberman MD        dextrose 10% bolus 125 mL 125 mL  12.5 g Intravenous PRN Rey Lieberman MD        dextrose 10% bolus 125 mL 125 mL  12.5 g Intravenous PRN Rey Lieberman MD        dextrose 10% bolus 250 mL 250 mL  25 g Intravenous PRN Rey Lieberman MD        dextrose 10% bolus 250 mL 250 mL  25 g Intravenous PRN Rey Lieberman MD        dextrose 10% bolus 250 mL 250 mL  25 g Intravenous PRN Rey Lieberman MD        dextrose 5 % and 0.45 % NaCl infusion  125 mL/hr Intravenous Continuous PRN Rey Lieberman MD        fluticasone propionate 50 mcg/actuation nasal spray 50 mcg  1 spray Each Nostril Daily Marifer Boudreaux PA-C   50 mcg at 06/25/24 0845    furosemide  injection 20 mg  20 mg Intravenous BID Jose L Méndez MD   20 mg at 06/25/24 0845    heparin (porcine) injection 5,000 Units  5,000 Units Subcutaneous Q8H Rey Lieberman MD   5,000 Units at 06/25/24 1107    hydrALAZINE injection 15 mg  15 mg Intravenous Q4H PRN Rey Lieberman MD        HYDROcodone-acetaminophen  mg per tablet 1 tablet  1 tablet Oral Q8H PRN Marifer Boudreaux PA-C   1 tablet at 06/25/24 0858    insulin aspart U-100 pen 0-10 Units  0-10 Units Subcutaneous QID (AC + HS) PRN Rey Lieberman MD   2 Units at 06/24/24 2109    insulin aspart U-100 pen 15 Units  15 Units Subcutaneous TIDWM Prasanna Montes MD        insulin glargine U-100 (Lantus) pen 30 Units  30 Units Subcutaneous BID Prasanna Montes MD        LIDOcaine 5 % patch 2 patch  2 patch Transdermal Daily Rey Lieberman MD   2 patch at 06/25/24 0857    melatonin tablet 6 mg  6 mg Oral Nightly PRN Rey Lieberman MD   6 mg at 06/24/24 2101    methocarbamoL tablet 500 mg  500 mg Oral TID PRN Marifer Boudreaux PA-C   500 mg at 06/24/24 2100    metoclopramide HCl tablet 5 mg  5 mg Oral QID (AC & HS) Marifer Boudreaux PA-C   5 mg at 06/25/24 1107    metoprolol succinate (TOPROL-XL) 24 hr tablet 100 mg  100 mg Oral BID Rey Lieberman MD   100 mg at 06/25/24 0844    miconazole nitrate 2% ointment   Topical (Top) BID Rey Lieberman MD   Given at 06/25/24 0846    mupirocin 2 % ointment   Nasal BID Rey Lieberman MD   Given at 06/25/24 0846    naloxone 0.4 mg/mL injection 0.02 mg  0.02 mg Intravenous PRN Rey Lieberman MD        NIFEdipine 24 hr tablet 30 mg  30 mg Oral BID Jose L Méndez MD   30 mg at 06/25/24 0844    ondansetron injection 4 mg  4 mg Intravenous Q4H PRN eRy Lieberman MD        senna-docusate 8.6-50 mg per tablet 1 tablet  1 tablet Oral BID PRN Rey Lieberman MD        sertraline tablet 100 mg  100 mg Oral Daily Marifer Boudreaux, PA-C   100 mg at 06/25/24 0840    sodium chloride 0.9% flush  10 mL  10 mL Intravenous PRN Rey Lieberman MD        sodium chloride 0.9% flush 10 mL  10 mL Intravenous Q8H Rey Lieebrman MD   10 mL at 06/25/24 0543    sodium chloride 0.9% flush 10 mL  10 mL Intravenous PRN Rey Lieberman MD        sodium chloride 0.9% flush 3 mL  3 mL Intravenous PRN Rey Lieberman MD            Physical Exam    Temp:  [97.2 °F (36.2 °C)-98.8 °F (37.1 °C)]   Pulse:  [73-81]   Resp:  [11-18]   BP: (103-156)/(52-97)   SpO2:  [94 %-99 %]    Wt Readings from Last 3 Encounters:   06/25/24 91.5 kg (201 lb 11.5 oz)   05/22/24 89.8 kg (198 lb)   05/15/24 89.9 kg (198 lb 3.2 oz)     Physical Exam  Constitutional:       General: She is not in acute distress.     Interventions: Nasal cannula in place.   Neck:      Vascular: No hepatojugular reflux or JVD.   Cardiovascular:      Rate and Rhythm: Normal rate and regular rhythm.      Heart sounds: S1 normal and S2 normal. Murmur heard.      Systolic murmur is present.      Gallop present. S4 sounds present. No S3 sounds.   Pulmonary:      Effort: Pulmonary effort is normal.      Breath sounds: Normal breath sounds and air entry.   Abdominal:      General: Bowel sounds are normal.      Palpations: Abdomen is soft. There is no hepatomegaly.      Tenderness: There is no abdominal tenderness.   Musculoskeletal:      Right lower leg: No edema.      Left lower leg: No edema.   Skin:     Coloration: Skin is not pale.   Neurological:      Mental Status: She is alert.   Psychiatric:         Behavior: Behavior is cooperative.         Telemetry  Sinus rhythm    Labs  Recent Results (from the past 24 hour(s))   POCT glucose    Collection Time: 06/24/24  4:47 PM   Result Value Ref Range    POCT Glucose 65 (L) 70 - 110 mg/dL   POCT glucose    Collection Time: 06/24/24  5:13 PM   Result Value Ref Range    POCT Glucose 76 70 - 110 mg/dL   POCT glucose    Collection Time: 06/24/24  7:57 PM   Result Value Ref Range    POCT Glucose 208 (H) 70 - 110 mg/dL   CBC Auto  Differential    Collection Time: 06/25/24  4:21 AM   Result Value Ref Range    WBC 15.62 (H) 3.90 - 12.70 K/uL    RBC 3.52 (L) 4.00 - 5.40 M/uL    Hemoglobin 9.9 (L) 12.0 - 16.0 g/dL    Hematocrit 30.4 (L) 37.0 - 48.5 %    MCV 86 82 - 98 fL    MCH 28.1 27.0 - 31.0 pg    MCHC 32.6 32.0 - 36.0 g/dL    RDW 15.4 (H) 11.5 - 14.5 %    Platelets 267 150 - 450 K/uL    MPV 11.3 9.2 - 12.9 fL    Immature Granulocytes 0.6 (H) 0.0 - 0.5 %    Gran # (ANC) 12.1 (H) 1.8 - 7.7 K/uL    Immature Grans (Abs) 0.10 (H) 0.00 - 0.04 K/uL    Lymph # 2.0 1.0 - 4.8 K/uL    Mono # 1.1 (H) 0.3 - 1.0 K/uL    Eos # 0.3 0.0 - 0.5 K/uL    Baso # 0.03 0.00 - 0.20 K/uL    nRBC 0 0 /100 WBC    Gran % 77.8 (H) 38.0 - 73.0 %    Lymph % 12.7 (L) 18.0 - 48.0 %    Mono % 7.0 4.0 - 15.0 %    Eosinophil % 1.7 0.0 - 8.0 %    Basophil % 0.2 0.0 - 1.9 %    Differential Method Automated    Comprehensive Metabolic Panel    Collection Time: 06/25/24  4:21 AM   Result Value Ref Range    Sodium 137 136 - 145 mmol/L    Potassium 3.4 (L) 3.5 - 5.1 mmol/L    Chloride 100 95 - 110 mmol/L    CO2 26 23 - 29 mmol/L    Glucose 148 (H) 70 - 110 mg/dL    BUN 21 8 - 23 mg/dL    Creatinine 1.0 0.5 - 1.4 mg/dL    Calcium 9.1 8.7 - 10.5 mg/dL    Total Protein 6.6 6.0 - 8.4 g/dL    Albumin 2.5 (L) 3.5 - 5.2 g/dL    Total Bilirubin 0.3 0.1 - 1.0 mg/dL    Alkaline Phosphatase 62 55 - 135 U/L    AST 20 10 - 40 U/L    ALT 18 10 - 44 U/L    eGFR 58 (A) >60 mL/min/1.73 m^2    Anion Gap 11 8 - 16 mmol/L   Magnesium    Collection Time: 06/25/24  4:21 AM   Result Value Ref Range    Magnesium 1.8 1.6 - 2.6 mg/dL   Phosphorus    Collection Time: 06/25/24  4:21 AM   Result Value Ref Range    Phosphorus 3.7 2.7 - 4.5 mg/dL   Procalcitonin    Collection Time: 06/25/24  4:21 AM   Result Value Ref Range    Procalcitonin 0.78 (H) <0.25 ng/mL   POCT glucose    Collection Time: 06/25/24  8:02 AM   Result Value Ref Range    POCT Glucose 140 (H) 70 - 110 mg/dL   POCT glucose    Collection Time:  06/25/24 11:46 AM   Result Value Ref Range    POCT Glucose 235 (H) 70 - 110 mg/dL             Jose L Méndez MD

## 2024-06-25 NOTE — PLAN OF CARE
Recommendations  1) Continue diabetic diet with 1500 mL fluid restrictions per MD    2) Encourage intake of current diet order    3) Monitor PO intake, weights and labs   4) RD to monitor and follow up    Goals: Pt will meet 50-75% intake of meals by RD followup  Nutrition Goal Status: new  Communication of RD Recs:  (POC)

## 2024-06-25 NOTE — PROGRESS NOTES
[facility] - [facility] ([address])  Adult Nutrition  Progress Note    SUMMARY       Recommendations  1) Continue diabetic diet with 1500 mL fluid restrictions per MD    2) Encourage intake of current diet order    3) Monitor PO intake, weights and labs   4) RD to monitor and follow up    Goals: Pt will meet 50-75% intake of meals by RD followup  Nutrition Goal Status: new  Communication of RD Recs:  (POC)    Assessment and Plan  Nutrition Problem  Limited adherence to nutrition related recommendations    Related to (etiology):   Diabetes Comorbidity     Signs and Symptoms (as evidenced by):   Cardiac disease; chronic pain; CKD IV; hypercholesterolemia; GERD  Body mass index is 39.4 kg/m².  A1c: 9.5      Interventions  (treatment strategy):  Nutrition education - Clinical Reference attachments to d/c documents  CHO/fluid modified diet  Collaboration with other providers    Nutrition Diagnosis Status:   New      Malnutrition Assessment     Skin (Micronutrient): wounds unhealed ([address] - buttock, thoracic)       Fluid Accumulation (Malnutrition):  (trace)                         Reason for Assessment    Reason For Assessment: RD follow-up  Diagnosis: cardiac disease, diabetes diagnosis/complications (Acute on chronic combined systolic and diastolic congestive heart failure)  Relevant Medical History:   Patient Active Problem List   Diagnosis    Abnormal electrocardiogram    Abnormal presence of protein in urine    Gastroesophageal reflux disease without esophagitis    Anxiety disorder    CAFL (chronic airflow limitation)    Calculus of kidney    Chronic pain syndrome    Clinical depression    Fibromyalgia    Familial hypercholesterolemia    Arthritis or polyarthritis, rheumatoid    Age-related osteoporosis without current pathological fracture    Diabetic polyneuropathy    History of renal calculi    History of cervical cancer    Mucopurulent chronic bronchitis    Radiculopathy of thoracolumbar region    Neuropathic  pain    Former smoker    Coronary artery calcification    Chronic pain    Essential hypertension    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 pectoris    Noncompliance    Recurrent major depressive disorder, in remission    Adverse effect of bisphosphonate    Class 2 severe obesity due to excess calories with serious comorbidity in adult    Hemoptysis    Dysphagia    Aortic atherosclerosis    Stage 3b chronic kidney disease    Vitamin D deficiency    IgA mediated leukocytoclastic vasculitis    Athscl heart disease of native coronary artery w/o ang pctrs    Depression, unspecified    Acute on chronic combined systolic and diastolic congestive heart failure    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 disease    Advanced care planning/counseling discussion    Stage 4 chronic kidney disease    Primary osteoarthritis of right knee    Acute respiratory failure with hypoxia and hypercapnia    Hemiplegia and hemiparesis following cerebral infarction affecting left non-dominant side    Generalized anxiety disorder    Stage 3 chronic kidney disease    Diabetic ketoacidosis without coma associated with type 2 diabetes mellitus    Class 2 obesity with body mass index (BMI) of 39.0 to 39.9 in adult     Interdisciplinary Rounds: did not attend  General Information Comments: Patient with daughter in room. Receiving a 1500 kcal diabetic diet with 1500 mL fluid restriction. Tolerated 3 bites of grits this morning, and a few bites (< 25%) of lunch (fish, squash medley). No weight loss per pt and daughter. Daughter reports patient has better appetite at home and typically eats 3x/day. Pt does not like commercial beverages. Provided menu to encourage intake per pt's preferences. Pt c/o pain, no GI intolerance or N/V. RD offered resources for diet education, will attach to discharge documents per family wishes. Midline. [address] - thoracic, buttocks. NFPE pt appeared nourished.  Nutrition  Discharge Planning: dc on diabetic diet with 1500 mL fluid restrictions    Nutrition Related Social Determinants of Health: SDOH: Adequate food in home environment    Nutrition Risk Screen    Nutrition Risk Screen: reduced oral intake over the last month    Nutrition/Diet History    Spiritual, Cultural Beliefs, Uatsdin Practices, Values that Affect Care: no  Supplemental Drinks or Food Habits:  ([employer])  Food Allergies: NKFA  Factors Affecting Nutritional Intake: pain    Anthropometrics    Temp: 97.5 °F (36.4 °C)  Height Method: Stated  Height: 5' (152.4 cm)  Height (inches): 60 in  Weight Method: Bed Scale  Weight: 91.5 kg (201 lb 11.5 oz)  Weight (lb): 201.72 lb  Ideal Body Weight (IBW), Female: 100 lb  % Ideal Body Weight, Female (lb): 201.72 %  BMI (Calculated): 39.4  BMI Grade: 35 - 39.9 - obesity - grade II       Lab/Procedures/Meds    Pertinent Labs Reviewed: reviewed  CBC:  Recent Labs   Lab 06/25/24 0421   WBC 15.62*   HGB 9.9*   HCT 30.4*        CMP:  Recent Labs   Lab 06/25/24 0421   CALCIUM 9.1   ALBUMIN 2.5*   PROT 6.6      K 3.4*   CO2 26      BUN 21   CREATININE 1.0   ALKPHOS 62   ALT 18   AST 20   BILITOT 0.3     BMP:   Recent Labs   Lab 06/25/24 0421   *      K 3.4*      CO2 26   BUN 21   CREATININE 1.0   CALCIUM 9.1   MG 1.8         Pertinent Medications Reviewed: reviewed  Scheduled Meds:   albuterol-ipratropium  3 mL Nebulization Q4H    allopurinoL  300 mg Oral Daily    aspirin  81 mg Oral Daily    cloNIDine  0.1 mg Oral BID    fluticasone propionate  1 spray Each Nostril Daily    [START ON 6/26/2024] furosemide  40 mg Oral Daily    heparin (porcine)  5,000 Units Subcutaneous Q8H    insulin aspart U-100  15 Units Subcutaneous TIDWM    insulin glargine U-100  30 Units Subcutaneous BID    LIDOcaine  2 patch Transdermal Daily    losartan  50 mg Oral Q12H    metoclopramide HCl  5 mg Oral QID (AC & HS)    metoprolol succinate  100 mg Oral BID    miconazole  nitrate 2%   Topical (Top) BID    mupirocin   Nasal BID    NIFEdipine  30 mg Oral BID    pregabalin  75 mg Oral TID    sertraline  100 mg Oral Daily    sodium chloride 0.9%  10 mL Intravenous Q8H     Continuous Infusions:  PRN Meds:.  Current Facility-Administered Medications:     acetaminophen, 1,000 mg, Oral, Q8H PRN    albuterol-ipratropium, 3 mL, Nebulization, Q4H PRN    dextrose 10%, 12.5 g, Intravenous, PRN    dextrose 10%, 25 g, Intravenous, PRN    glucagon (human recombinant), 1 mg, Intramuscular, PRN    glucose, 16 g, Oral, PRN    glucose, 24 g, Oral, PRN    hydrALAZINE, 15 mg, Intravenous, Q4H PRN    insulin aspart U-100, 0-5 Units, Subcutaneous, QID (AC + HS) PRN    melatonin, 6 mg, Oral, Nightly PRN    methocarbamoL, 500 mg, Oral, TID PRN    naloxone, 0.02 mg, Intravenous, PRN    ondansetron, 4 mg, Intravenous, Q4H PRN    oxyCODONE, 5 mg, Oral, Q4H PRN    senna-docusate 8.6-50 mg, 1 tablet, Oral, BID PRN    Estimated/Assessed Needs    Weight Used For Calorie Calculations: 91.5 kg (201 lb 11.5 oz)  Energy Calorie Requirements (kcal): 1710  Energy Need Method: [place]-St Jeor (x 1.3)  Protein Requirements: 92 g (1 g/kg)  Weight Used For Protein Calculations: 91.5 kg (201 lb 11.5 oz)  Fluid Requirements (mL): 1 mL/kcal  Estimated Fluid Requirement Method:  (or per MD)  RDA Method (mL): 1710  CHO Requirement: 200 g (~50% total EEN)      Nutrition Prescription Ordered    Current Diet Order: Diabetic - 1500 kcal - 1500 mL    Evaluation of Received Nutrient/Fluid Intake    % Kcal Needs: < 25%  I/O: - / 600  Energy Calories Required: not meeting needs  Protein Required: not meeting needs  Fluid Required: not meeting needs  Comments: LBM: 6/23  Tolerance: tolerating  % Intake of Estimated Energy Needs: 0 - 25 %  % Meal Intake: 0 - 25 %    Nutrition Risk    Level of Risk/Frequency of Follow-up: moderate (1-2 x/week)     Monitor and Evaluation    Food and Nutrient Intake: energy intake, food and beverage  intake  Food and Nutrient Adminstration: diet order  Physical Activity and Function: nutrition-related ADLs and IADLs  Anthropometric Measurements: weight, weight change  Biochemical Data, Medical Tests and Procedures: electrolyte and renal panel, gastrointestinal profile, glucose/endocrine profile, inflammatory profile, lipid profile  Nutrition-Focused Physical Findings: overall appearance     Nutrition Follow-Up    RD Follow-up?: Yes    Li Harkins RDN, [facility]

## 2024-06-25 NOTE — PLAN OF CARE
Problem: Adult Inpatient Plan of Care  Goal: Plan of Care Review  Outcome: Progressing  Goal: Patient-Specific Goal (Individualized)  Outcome: Progressing  Goal: Absence of Hospital-Acquired Illness or Injury  Outcome: Progressing  Goal: Optimal Comfort and Wellbeing  Outcome: Progressing  Goal: Readiness for Transition of Care  Outcome: Progressing     Problem: COPD (Chronic Obstructive Pulmonary Disease)  Goal: Optimal Chronic Illness Coping  Outcome: Progressing  Goal: Optimal Level of Functional [place]  Outcome: Progressing  Goal: Absence of Infection Signs and Symptoms  Outcome: Progressing  Goal: Improved Oral Intake  Outcome: Progressing  Goal: Effective Oxygenation and Ventilation  Outcome: Progressing     Problem: Diabetes Comorbidity  Goal: Blood Glucose Level Within Targeted Range  Outcome: Progressing     Problem: Fall Injury Risk  Goal: Absence of Fall and Fall-Related Injury  Outcome: Progressing     Problem: Skin Injury Risk Increased  Goal: Skin Health and Integrity  Outcome: Progressing    Pt remained free from falls or injuries this shift. Pt turned q2hrs. No skin breakdown noticed. Pt w chronic pain. Spoke w Dr. Rivas about restarting home lyrica. Worked w PT today.

## 2024-06-25 NOTE — PLAN OF CARE
POC reviewed with patient this shift.  Remains A/O x4.  Respirations unlabored.  Skin w/d.  Pt continues to c/o generalized chronic pain.  PRN Oxy administered x1 thus far in shift.  Tolerates meds whole with water without difficulty.  Pt unable to tolerate Bipap for entire shift despite multiple attempts.  VSS.  CBG monitoring in progress with scheduled/SS insulin administered as ordered.  See flowsheet for full assessment.  Able to verbalize wants/needs.  No s/s of distress.  Fall/safety precautions maintained.      Problem: Adult Inpatient Plan of Care  Goal: Plan of Care Review  Outcome: Progressing     Problem: COPD (Chronic Obstructive Pulmonary Disease)  Goal: Absence of Infection Signs and Symptoms  Outcome: Progressing     Problem: Diabetes Comorbidity  Goal: Blood Glucose Level Within Targeted Range  Outcome: Progressing  Intervention: Monitor and Manage Glycemia  Flowsheets (Taken 6/25/2024 1347)  Glycemic Management: blood glucose monitored

## 2024-06-25 NOTE — PT/OT/SLP PROGRESS
Occupational Therapy   Treatment    Name: Indira Waters  MRN: [id number]  Admitting Diagnosis:  Acute respiratory failure with hypoxia and hypercapnia       Recommendations:     Discharge Recommendations: Moderate Intensity Therapy (Pt and daughter reporting that they do not want pt to go to a facility for continued therapy and want pt to go home with Home Health OT and PT and assistance from pt's 2 daughters.)  Discharge Equipment Recommendations:  to be determined by next level of care, walker, rolling, bedside commode  Barriers to discharge:  Inaccessible home environment (current functional level)    Assessment:     nIdira Waters is a 79 y.o. female with a medical diagnosis of Acute respiratory failure with hypoxia and hypercapnia.  She presents with daughter during session. Performance deficits affecting function are weakness, impaired endurance, impaired self care skills, impaired functional mobility, gait instability, impaired balance, decreased coordination, decreased lower extremity function, decreased safety awareness, pain, decreased ROM, impaired cardiopulmonary response to activity, edema.  Patient with CGA, RW for functional mobility. CGA for momo hygiene while standing with RW; no clothing management performed. CGA, RW for hand washing standing at the sink. Overall, tolerance session well and progressing toward goals.     Rehab Prognosis:  Good; patient would benefit from acute skilled OT services to address these deficits and reach maximum level of function.       Plan:     Patient to be seen 4 x/week to address the above listed problems via self-care/home management, therapeutic activities, therapeutic exercises  Plan of Care Expires: 07/07/24  Plan of Care Reviewed with: patient, daughter    Subjective     Chief Complaint: None stated   Patient/Family Comments/goals: wanting to sit up in the chair   Pain/Comfort:  Pain Rating 1: 0/10    Objective:     Communicated with: RN prior to  session.  Patient found supine with oxygen, peripheral IV, PureWick, bed alarm upon OT entry to room.    General Precautions: Standard, diabetic, fall    Orthopedic Precautions:N/A  Braces: N/A  Respiratory Status: Nasal cannula, flow 3 L/min     Occupational Performance:     Bed Mobility:    Supine > Sit: Min A  with increase of time     Functional Mobility/Transfers:  Sit > stand: Min A   Functional Mobility: CGA, RW with slow pacing   BSC Trasnfer: Min A with stand pivot with RW from the bed   Chair Transfer: Min A     Activities of Daily Living:  Toileting: CGA for momo hygiene while standing; no clothing management performed   Grooming: CGA, RW for hand washing standing at the sink      [facility] 6 Click ADL: 17    Treatment & Education:  OT role, plan of care, progression of goals, importance of continued OOB activity, ADL/functional transfer and mobility retraining, discharge recommendation, call don't fall, safety precautions, fall prevention.     Patient left up in chair with all lines intact, call button in reach, RN notified, and daughter present    GOALS:   Multidisciplinary Problems       Occupational Therapy Goals          Problem: Occupational Therapy    Goal Priority Disciplines Outcome Interventions   Occupational Therapy Goal     OT, PT/OT Progressing    Description: Goals to be met by: 7/7/2024     Patient will increase functional independence with ADLs by performing:    UE Dressing with Supervision.  LE Dressing with Stand-by Assistance and Assistive Devices as needed.  Grooming while standing at sink with Stand-by Assistance.  MET 6/24/2024   Toileting from toilet with Supervision for hygiene and clothing management.   Toilet transfer to toilet with Supervision.                         Time Tracking:     OT Date of Treatment: 06/25/24  OT Start Time: 1330  OT Stop Time: 1400  OT Total Time (min): 30 min    Billable Minutes:Self Care/Home Management 30 min    OT/AMANDA: AMANDA     Number of AMANDA visits  since last OT visit: 1 6/25/2024

## 2024-06-25 NOTE — NURSING
prior to breakfast however pt only ate 3 bites of grits and doesn't want any more. Pt had episode of hypoglycemia yesterday w decreased appetite. Pt has lantus 35 unirs and novolog 20 units due. notified Dr. Rivas, wants to assess pt first. Novolgo held a this time

## 2024-06-25 NOTE — PROGRESS NOTES
"[facility] Surg [address] Medicine  Progress Note    Patient Name: Indira Waters  MRN: [id number]  Patient Class: IP- Inpatient   Admission Date: 6/20/2024  Length of Stay: 4 days  Attending Physician: Prasanna Montes MD  Primary Care Provider: Chun Zapata MD        Subjective:     Principal Problem:Acute on chronic combined systolic and diastolic congestive heart failure        HPI:  Per Marifer Boudreaux, PA-C:    "Ms. Indira Waters is a 79 y.o. female, with PMH of Chronic pain, Chronic airflow limitation, chronic bronchitis, former smoker, CAD, HTN, T2DM, Diabetic polyneuropathy, RA, GERD, MDD, anxiety, fibromyalgia, h/o cervical cancer, obesity, CDK-4, SLE, who presented to [facility] ED on 6/20/24 from her PCPs office due to shortness of breath that occurs when it rains outside with associated hypoxia in the office. She states she has felt short of breath all week, and that at baseline she has FERRIS, which is now also occurring at rest and is associated with chest tightness. She further notes onset of lower extremity swelling. She denied fever, chills, cough. She does not utilize home O2. ED workup with labs including a CBC which shows anemia with H&H of 10.4/32.0, but no leukocytosis or left shift. A metabolic panel showed RANJEET with BUN 33, and Cr of 1.9 without hyperkalemia. CO2 was 22, and anion gap was 16, glucose was 382. BNP was 132, without elevation of troponin, and with CTX showing no acute cardiopulmonary process. A D-dimer was not elevated. She was treated in the ED with 3 DuoNebs, one hour long neb, and IV steroids. Initially she was maintaining adequate O2 sats on room air. At about midnight her BP jerri to 204/77, when she was apparently reporting pain, and then at about 01:40 she had an acute decline in O2 sats with worsened shortness of breath. An ABG was ordered and showed CO2 retention, she was placed on BiPap. Orders were changed from tele to " "ICU."    Overview/Hospital Course:  As per Rey Lieberman MD:    "79 year old woman with chronic pain syndrome, fibromyalgia, chronic bronchitis, former tobacco user, coronary artery disease, hypertension, GERD, MDD, CKD4 who presented for evaluation of shortness of breath.  Initially thought to have copd exacerbation she was treated with steroids and nebulziers in ED.  On her first night she decompensated requiring transfer to ICU and bipap treatment.  Repeat cxr showed flash pulmonary edema.  We believe this was caused by severe knee pain, triggering a spike in blood pressure and subsequent pulmonary edema.  Pain and blood pressure are reasonably controlled now.  She was treated with IV lasix and has been weaned to nasal canula O2 with bipap at night.  Likely needs several more nights.  Also had DKA which has resolved.  She is requiring significant doses of insulin, higher than at home, and likely to require several more days of adjustments.  Likely 2-3 more days of gentle IV lasix before able to wean back to room air."    Interval History: Breathing improving.    Review of Systems   Constitutional:  Negative for chills and fever.   Respiratory:  Positive for shortness of breath.    Cardiovascular:  Negative for chest pain.   Gastrointestinal:  Negative for abdominal pain, nausea and vomiting.   Genitourinary:  Negative for dysuria and frequency.     Objective:     Vital Signs (Most Recent):  Temp: 97.5 °F (36.4 °C) (06/25/24 1654)  Pulse: 80 (06/25/24 1654)  Resp: 14 (06/25/24 1654)  BP: (!) 148/93 (06/25/24 1654)  SpO2: 99 % (06/25/24 1654) Vital Signs (24h Range):  Temp:  [97.2 °F (36.2 °C)-98.8 °F (37.1 °C)] 97.5 °F (36.4 °C)  Pulse:  [73-81] 80  Resp:  [11-18] 14  SpO2:  [94 %-99 %] 99 %  BP: (103-156)/(52-97) 148/93     Weight: 91.5 kg (201 lb 11.5 oz)  Body mass index is 39.4 kg/m².    Intake/Output Summary (Last 24 hours) at 6/25/2024 2228  Last data filed at 6/25/2024 1100  Gross per 24 hour   Intake 490 ml "   Output 1250 ml   Net -760 ml         Physical Exam  Constitutional:       General: She is not in acute distress.  HENT:      Head: Atraumatic.   Eyes:      Conjunctiva/sclera: Conjunctivae normal.   Cardiovascular:      Rate and Rhythm: Normal rate and regular rhythm.      Heart sounds: Normal heart sounds. No murmur heard.  Pulmonary:      Effort: Pulmonary effort is normal.      Breath sounds: Rales present. No wheezing.      Comments: Moving air fairly well  Abdominal:      General: Bowel sounds are normal. There is no distension.      Palpations: Abdomen is soft.      Tenderness: There is no abdominal tenderness.   Musculoskeletal:         General: No deformity. Normal range of motion.      Cervical back: Neck supple.      Right lower leg: Edema present.      Left lower leg: Edema present.   Neurological:      Mental Status: She is alert and oriented to person, place, and time.             Significant Labs: All pertinent labs within the past 24 hours have been reviewed.    Significant Imaging: I have reviewed all pertinent imaging results/findings within the past 24 hours.    Assessment/Plan:      * Acute on chronic combined systolic and diastolic congestive heart failure  Improving.  Continue diuresis.    Acute respiratory failure with hypoxia and hypercapnia  Due to heart failure.  Improving with treating heart failure.  Continue efforts at achieving euvolemia.  Wean supplemental oxygen as tolerated.    Class 2 obesity with body mass index (BMI) of 39.0 to 39.9 in adult  Body mass index is 39.05 kg/m². Morbid obesity complicates all aspects of disease management from diagnostic modalities to treatment. Weight loss encouraged and health benefits explained to patient.    Diabetic ketoacidosis without coma associated with type 2 diabetes mellitus  Mild hypoglycemic.  Reduce insulin regimen.  Monitor.      Hemiplegia and hemiparesis following cerebral infarction affecting left non-dominant side  -History  noted  -Fall precautions ordered  -Consulted PT/OT - recommend SNF - patient agreeable    Stage 4 chronic kidney disease  Relatively stable now.  Monitor.    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 pectoris  -Noted history  -Denies chest pain  -Echo without any notable segmental wall motion abnormalities  -Continue aspirin, toprol and statin     Essential hypertension  Restarted ARB this morning.  Monitor response to treatment.  Monitor kidney function.    Diabetic polyneuropathy  Breathing improved.  Restart Lyrica.    Chronic pain syndrome  Restart Lyrica      VTE Risk Mitigation (From admission, onward)           Ordered     heparin (porcine) injection 5,000 Units  Every 8 hours (non-standard times)         06/21/24 1008     IP VTE HIGH RISK PATIENT  Once         06/21/24 1008     Place SUHAS hose  Until discontinued         06/21/24 1008     Place sequential compression device  Until discontinued         06/21/24 1008                      Prasanna Montes MD  Department of Hospital Medicine   [facility] - Med Surg ([address])

## 2024-06-26 VITALS
BODY MASS INDEX: 39.47 KG/M2 | WEIGHT: 201.06 LBS | DIASTOLIC BLOOD PRESSURE: 90 MMHG | TEMPERATURE: 97 F | OXYGEN SATURATION: 95 % | RESPIRATION RATE: 16 BRPM | HEART RATE: 75 BPM | HEIGHT: 60 IN | SYSTOLIC BLOOD PRESSURE: 135 MMHG

## 2024-06-26 LAB
ALBUMIN SERPL BCP-MCNC: 2.5 G/DL (ref 3.5–5.2)
ALP SERPL-CCNC: 70 U/L (ref 55–135)
ALT SERPL W/O P-5'-P-CCNC: 17 U/L (ref 10–44)
ANION GAP SERPL CALC-SCNC: 13 MMOL/L (ref 8–16)
AST SERPL-CCNC: 27 U/L (ref 10–40)
BASOPHILS # BLD AUTO: 0.03 K/UL (ref 0–0.2)
BASOPHILS NFR BLD: 0.2 % (ref 0–1.9)
BILIRUB SERPL-MCNC: 0.3 MG/DL (ref 0.1–1)
BUN SERPL-MCNC: 20 MG/DL (ref 8–23)
CALCIUM SERPL-MCNC: 9 MG/DL (ref 8.7–10.5)
CHLORIDE SERPL-SCNC: 97 MMOL/L (ref 95–110)
CO2 SERPL-SCNC: 25 MMOL/L (ref 23–29)
CREAT SERPL-MCNC: 1.1 MG/DL (ref 0.5–1.4)
DIFFERENTIAL METHOD BLD: ABNORMAL
EOSINOPHIL # BLD AUTO: 0.4 K/UL (ref 0–0.5)
EOSINOPHIL NFR BLD: 2.5 % (ref 0–8)
ERYTHROCYTE [DISTWIDTH] IN BLOOD BY AUTOMATED COUNT: 15.3 % (ref 11.5–14.5)
EST. GFR  (NO RACE VARIABLE): 51 ML/MIN/1.73 M^2
GLUCOSE SERPL-MCNC: 186 MG/DL (ref 70–110)
HCT VFR BLD AUTO: 29.6 % (ref 37–48.5)
HGB BLD-MCNC: 9.6 G/DL (ref 12–16)
IMM GRANULOCYTES # BLD AUTO: 0.12 K/UL (ref 0–0.04)
IMM GRANULOCYTES NFR BLD AUTO: 0.8 % (ref 0–0.5)
LYMPHOCYTES # BLD AUTO: 2.2 K/UL (ref 1–4.8)
LYMPHOCYTES NFR BLD: 14.6 % (ref 18–48)
MAGNESIUM SERPL-MCNC: 1.9 MG/DL (ref 1.6–2.6)
MCH RBC QN AUTO: 27.8 PG (ref 27–31)
MCHC RBC AUTO-ENTMCNC: 32.4 G/DL (ref 32–36)
MCV RBC AUTO: 86 FL (ref 82–98)
MONOCYTES # BLD AUTO: 1.1 K/UL (ref 0.3–1)
MONOCYTES NFR BLD: 7.4 % (ref 4–15)
NEUTROPHILS # BLD AUTO: 11.5 K/UL (ref 1.8–7.7)
NEUTROPHILS NFR BLD: 74.5 % (ref 38–73)
NRBC BLD-RTO: 0 /100 WBC
PHOSPHATE SERPL-MCNC: 3.7 MG/DL (ref 2.7–4.5)
PLATELET # BLD AUTO: 304 K/UL (ref 150–450)
PMV BLD AUTO: 11 FL (ref 9.2–12.9)
POCT GLUCOSE: 103 MG/DL (ref 70–110)
POCT GLUCOSE: 133 MG/DL (ref 70–110)
POCT GLUCOSE: 174 MG/DL (ref 70–110)
POCT GLUCOSE: 219 MG/DL (ref 70–110)
POCT GLUCOSE: 233 MG/DL (ref 70–110)
POTASSIUM SERPL-SCNC: 3.7 MMOL/L (ref 3.5–5.1)
PROT SERPL-MCNC: 6.6 G/DL (ref 6–8.4)
RBC # BLD AUTO: 3.45 M/UL (ref 4–5.4)
SODIUM SERPL-SCNC: 135 MMOL/L (ref 136–145)
TB INDURATION 48 - 72 HR READ: NORMAL
TB SKIN TEST 48 - 72 HR READ: NORMAL
WBC # BLD AUTO: 15.36 K/UL (ref 3.9–12.7)

## 2024-06-26 PROCEDURE — 25000003 PHARM REV CODE 250: Performed by: HOSPITALIST

## 2024-06-26 PROCEDURE — 25000242 PHARM REV CODE 250 ALT 637 W/ HCPCS: Performed by: HOSPITALIST

## 2024-06-26 PROCEDURE — 25000003 PHARM REV CODE 250: Performed by: PHYSICIAN ASSISTANT

## 2024-06-26 PROCEDURE — A4216 STERILE WATER/SALINE, 10 ML: HCPCS | Performed by: HOSPITALIST

## 2024-06-26 PROCEDURE — 25000003 PHARM REV CODE 250: Performed by: INTERNAL MEDICINE

## 2024-06-26 PROCEDURE — 99900035 HC TECH TIME PER 15 MIN (STAT)

## 2024-06-26 PROCEDURE — 85025 COMPLETE CBC W/AUTO DIFF WBC: CPT | Performed by: HOSPITALIST

## 2024-06-26 PROCEDURE — 97116 GAIT TRAINING THERAPY: CPT | Mod: CQ

## 2024-06-26 PROCEDURE — 94640 AIRWAY INHALATION TREATMENT: CPT

## 2024-06-26 PROCEDURE — 63600175 PHARM REV CODE 636 W HCPCS: Performed by: HOSPITALIST

## 2024-06-26 PROCEDURE — 36415 COLL VENOUS BLD VENIPUNCTURE: CPT | Performed by: HOSPITALIST

## 2024-06-26 PROCEDURE — 83735 ASSAY OF MAGNESIUM: CPT | Performed by: HOSPITALIST

## 2024-06-26 PROCEDURE — 27000221 HC OXYGEN, UP TO 24 HOURS

## 2024-06-26 PROCEDURE — 80053 COMPREHEN METABOLIC PANEL: CPT | Performed by: HOSPITALIST

## 2024-06-26 PROCEDURE — 97535 SELF CARE MNGMENT TRAINING: CPT | Mod: CO

## 2024-06-26 PROCEDURE — 84100 ASSAY OF PHOSPHORUS: CPT | Performed by: HOSPITALIST

## 2024-06-26 PROCEDURE — 94761 N-INVAS EAR/PLS OXIMETRY MLT: CPT

## 2024-06-26 PROCEDURE — 99232 SBSQ HOSP IP/OBS MODERATE 35: CPT | Mod: ,,, | Performed by: INTERNAL MEDICINE

## 2024-06-26 RX ORDER — INSULIN GLARGINE 100 [IU]/ML
20 INJECTION, SOLUTION SUBCUTANEOUS 2 TIMES DAILY
Start: 2024-06-26 | End: 2025-06-26

## 2024-06-26 RX ORDER — INSULIN GLARGINE 100 [IU]/ML
25 INJECTION, SOLUTION SUBCUTANEOUS 2 TIMES DAILY
Status: DISCONTINUED | OUTPATIENT
Start: 2024-06-26 | End: 2024-06-26 | Stop reason: HOSPADM

## 2024-06-26 RX ORDER — OXYCODONE HYDROCHLORIDE 5 MG/1
5 TABLET ORAL EVERY 8 HOURS PRN
Start: 2024-06-26

## 2024-06-26 RX ORDER — INSULIN ASPART 100 [IU]/ML
0-5 INJECTION, SOLUTION INTRAVENOUS; SUBCUTANEOUS
Start: 2024-06-26 | End: 2025-06-26

## 2024-06-26 RX ORDER — IPRATROPIUM BROMIDE AND ALBUTEROL SULFATE 2.5; .5 MG/3ML; MG/3ML
3 SOLUTION RESPIRATORY (INHALATION) EVERY 4 HOURS PRN
Start: 2024-06-26

## 2024-06-26 RX ORDER — FUROSEMIDE 40 MG/1
40 TABLET ORAL DAILY
Start: 2024-06-27

## 2024-06-26 RX ORDER — PREGABALIN 75 MG/1
75 CAPSULE ORAL 3 TIMES DAILY
Start: 2024-06-26

## 2024-06-26 RX ORDER — INSULIN ASPART 100 [IU]/ML
10 INJECTION, SOLUTION INTRAVENOUS; SUBCUTANEOUS
Status: DISCONTINUED | OUTPATIENT
Start: 2024-06-26 | End: 2024-06-26 | Stop reason: HOSPADM

## 2024-06-26 RX ORDER — INSULIN ASPART 100 [IU]/ML
10 INJECTION, SOLUTION INTRAVENOUS; SUBCUTANEOUS 3 TIMES DAILY
Start: 2024-06-26 | End: 2025-06-26

## 2024-06-26 RX ORDER — TALC
6 POWDER (GRAM) TOPICAL NIGHTLY PRN
Start: 2024-06-26

## 2024-06-26 RX ADMIN — HEPARIN SODIUM 5000 UNITS: 5000 INJECTION INTRAVENOUS; SUBCUTANEOUS at 02:06

## 2024-06-26 RX ADMIN — PREGABALIN 75 MG: 75 CAPSULE ORAL at 03:06

## 2024-06-26 RX ADMIN — HEPARIN SODIUM 5000 UNITS: 5000 INJECTION INTRAVENOUS; SUBCUTANEOUS at 11:06

## 2024-06-26 RX ADMIN — IPRATROPIUM BROMIDE AND ALBUTEROL SULFATE 3 ML: 2.5; .5 SOLUTION RESPIRATORY (INHALATION) at 08:06

## 2024-06-26 RX ADMIN — METOPROLOL SUCCINATE 100 MG: 50 TABLET, EXTENDED RELEASE ORAL at 08:06

## 2024-06-26 RX ADMIN — IPRATROPIUM BROMIDE AND ALBUTEROL SULFATE 3 ML: 2.5; .5 SOLUTION RESPIRATORY (INHALATION) at 03:06

## 2024-06-26 RX ADMIN — PREGABALIN 75 MG: 75 CAPSULE ORAL at 08:06

## 2024-06-26 RX ADMIN — INSULIN GLARGINE 25 UNITS: 100 INJECTION, SOLUTION SUBCUTANEOUS at 08:06

## 2024-06-26 RX ADMIN — LIDOCAINE 5% 2 PATCH: 700 PATCH TOPICAL at 08:06

## 2024-06-26 RX ADMIN — METOCLOPRAMIDE 5 MG: 5 TABLET ORAL at 04:06

## 2024-06-26 RX ADMIN — METOCLOPRAMIDE 5 MG: 5 TABLET ORAL at 11:06

## 2024-06-26 RX ADMIN — MICONAZOLE NITRATE: 20 OINTMENT TOPICAL at 08:06

## 2024-06-26 RX ADMIN — MUPIROCIN: 20 OINTMENT TOPICAL at 08:06

## 2024-06-26 RX ADMIN — IPRATROPIUM BROMIDE AND ALBUTEROL SULFATE 3 ML: 2.5; .5 SOLUTION RESPIRATORY (INHALATION) at 11:06

## 2024-06-26 RX ADMIN — OXYCODONE HYDROCHLORIDE 5 MG: 5 TABLET ORAL at 02:06

## 2024-06-26 RX ADMIN — Medication 10 ML: at 02:06

## 2024-06-26 RX ADMIN — INSULIN ASPART 10 UNITS: 100 INJECTION, SOLUTION INTRAVENOUS; SUBCUTANEOUS at 12:06

## 2024-06-26 RX ADMIN — OXYCODONE HYDROCHLORIDE 5 MG: 5 TABLET ORAL at 08:06

## 2024-06-26 RX ADMIN — METOCLOPRAMIDE 5 MG: 5 TABLET ORAL at 06:06

## 2024-06-26 RX ADMIN — SERTRALINE HYDROCHLORIDE 100 MG: 50 TABLET ORAL at 08:06

## 2024-06-26 RX ADMIN — IPRATROPIUM BROMIDE AND ALBUTEROL SULFATE 3 ML: 2.5; .5 SOLUTION RESPIRATORY (INHALATION) at 04:06

## 2024-06-26 RX ADMIN — NIFEDIPINE 30 MG: 30 TABLET, FILM COATED, EXTENDED RELEASE ORAL at 08:06

## 2024-06-26 RX ADMIN — OXYCODONE HYDROCHLORIDE 5 MG: 5 TABLET ORAL at 03:06

## 2024-06-26 RX ADMIN — INSULIN ASPART 10 UNITS: 100 INJECTION, SOLUTION INTRAVENOUS; SUBCUTANEOUS at 05:06

## 2024-06-26 RX ADMIN — INSULIN ASPART 2 UNITS: 100 INJECTION, SOLUTION INTRAVENOUS; SUBCUTANEOUS at 12:06

## 2024-06-26 RX ADMIN — Medication 10 ML: at 06:06

## 2024-06-26 RX ADMIN — CLONIDINE HYDROCHLORIDE 0.1 MG: 0.1 TABLET ORAL at 08:06

## 2024-06-26 RX ADMIN — FUROSEMIDE 40 MG: 20 TABLET ORAL at 08:06

## 2024-06-26 RX ADMIN — FLUTICASONE PROPIONATE 50 MCG: 50 SPRAY, METERED NASAL at 08:06

## 2024-06-26 RX ADMIN — LOSARTAN POTASSIUM 50 MG: 50 TABLET, FILM COATED ORAL at 08:06

## 2024-06-26 RX ADMIN — ALLOPURINOL 300 MG: 300 TABLET ORAL at 08:06

## 2024-06-26 RX ADMIN — ASPIRIN 81 MG: 81 TABLET, COATED ORAL at 08:06

## 2024-06-26 RX ADMIN — INSULIN ASPART 10 UNITS: 100 INJECTION, SOLUTION INTRAVENOUS; SUBCUTANEOUS at 08:06

## 2024-06-26 NOTE — NURSING
At 2030 pt CBG=75.  Asymptomatic.  1 pack of gopal crackers given with peanut butter.  At 2209 CBG=78.  Team notified.  BRAN Bagley advised to hold scheduled 30u Lantus and re-check CBG at MN.  Order noted and carried out.

## 2024-06-26 NOTE — PT/OT/SLP PROGRESS
Physical Therapy Treatment    Patient Name:  Indira Waters   MRN:  [id number]    Recommendations:     Discharge Recommendations: Moderate Intensity Therapy (pt planning to DC home with HH and assist from daughters)  Discharge Equipment Recommendations: bedside commode, walker, rolling  Barriers to discharge: Inaccessible home and Decreased caregiver support    Assessment:     Indira Waters is a 79 y.o. female admitted with a medical diagnosis of Acute on chronic combined systolic and diastolic congestive heart failure.  She presents with the following impairments/functional limitations: weakness, gait instability, edema, impaired balance, impaired cardiopulmonary response to activity, impaired endurance, impaired functional mobility, impaired self care skills, decreased coordination, decreased lower extremity function, decreased ROM, decreased safety awareness, decreased upper extremity function.    Sit>stand with rollator and SBA  Toilet transfer with rollator and SBA, step transfer  Amb 4 x 20' with rollator and SBA, 2L O2, decreased gait speed, alonso and B step length  Ascended/descended 4 steps with B HR and CGA with step-to gait (x 2 trials = 8 steps total)  Sit>supine with SBA  SpO2 93-97% during activity on 2L, pt with good mobility, able to progress stair training today  Rec Moderate Intensity Therapy (pt planning to DC home with HH and assist from daughters)    The mobility limitation cannot be sufficiently resolved by the use of a cane.   Patient's functional mobility deficit can be sufficiently resolved with the use of a rolling walker. Patient's mobility limitation significantly impairs their ability to participate in one of more activities of daily living. The use of a rolling walker will significantly improve the patient's ability to participate in MRADLS and the patient will use it on regular basis in the home.       Rehab Prognosis: Good; patient would benefit from acute skilled PT  services to address these deficits and reach maximum level of function.    Recent Surgery: * No surgery found *      Plan:     During this hospitalization, patient to be seen 5 x/week to address the identified rehab impairments via gait training, therapeutic activities, therapeutic exercises, neuromuscular re-education and progress toward the following goals:    Plan of Care Expires:  07/23/24    Subjective     Chief Complaint: none stated  Patient/Family Comments/goals: pt feeling good today  Pain/Comfort:  Pain Rating 1: 0/10  Pain Rating Post-Intervention 1: 0/10      Objective:     Communicated with nurse Alcantara prior to session.  Patient found up in chair with oxygen, peripheral IV, PureWick, telemetry upon PT entry to room.     General Precautions: Standard, fall, diabetic, NPO  Orthopedic Precautions: N/A  Braces: N/A  Respiratory Status: Nasal cannula, flow 1.5 L/min     Functional Mobility:  Bed Mobility:     Sit to Supine: stand by assistance  Transfers:     Sit to Stand:  stand by assistance with 4 wheeled walker  Toilet Transfer: stand by assistance with  4 wheeled walker  using  Step Transfer  Gait: 4 x 20' with rollator and SBA, 2L O2, decreased gait speed, alonso and B step length  Stairs:  Pt ascended/descended 8 stair(s) with No Assistive Device with bilateral handrails with Contact Guard Assistance.       AM-PAC 6 CLICK MOBILITY  Turning over in bed (including adjusting bedclothes, sheets and blankets)?: 3  Sitting down on and standing up from a chair with arms (e.g., wheelchair, bedside commode, etc.): 3  Moving from lying on back to sitting on the side of the bed?: 3  Moving to and from a bed to a chair (including a wheelchair)?: 3  Need to walk in hospital room?: 3  Climbing 3-5 steps with a railing?: 2  Basic Mobility Total Score: 17       Treatment & Education:  Gait and stair training as noted    Patient left HOB elevated with all lines intact, call button in reach, bed alarm on, and nurse  Maricruz present..    GOALS:   Multidisciplinary Problems       Physical Therapy Goals          Problem: Physical Therapy    Goal Priority Disciplines Outcome Goal Variances Interventions   Physical Therapy Goal     PT, PT/OT Progressing     Description: Goals to be met by: 2024    Patient will increase functional independence with mobility by performin. Sit<>stand with Min A with RW.  2. Gait x 40 feet with RW with CGA.  3. Supine<>sit with CGA.                           Time Tracking:     PT Received On: 24  PT Start Time: 1330     PT Stop Time: 1400  PT Total Time (min): 30 min     Billable Minutes: Gait Training 30    Treatment Type: Treatment  PT/PTA: PTA     Number of PTA visits since last PT visit: 3     2024

## 2024-06-26 NOTE — PT/OT/SLP PROGRESS
Occupational Therapy   Treatment    Name: Indira Waters  MRN: [id number]  Admitting Diagnosis:  Acute on chronic combined systolic and diastolic congestive heart failure       Recommendations:     Discharge Recommendations: Moderate Intensity Therapy (pt planning to d/c home with HH and assist from daughters)  Discharge Equipment Recommendations:  bedside commode, walker, rolling, bath bench  Barriers to discharge:  Inaccessible home environment (current functional level)    Assessment:     Indira Waters is a 79 y.o. female with a medical diagnosis of Acute on chronic combined systolic and diastolic congestive heart failure.  She presents with daughter during session. Performance deficits affecting function are weakness, impaired endurance, impaired self care skills, gait instability, impaired functional mobility, impaired balance, decreased coordination, decreased upper extremity function, decreased lower extremity function, decreased safety awareness, decreased ROM, impaired cardiopulmonary response to activity, edema. Patient with CGA, RW for functional mobility with slow spacing. CGA, grab bars for toilet transfer. CGA for anterior and posterior momo hygiene while standing with RW - no clothing management performed. CGA/SBA, RW for grooming tasks at the sink. Overall, tolerated session well and progressing toward goals.     Rehab Prognosis:  Good; patient would benefit from acute skilled OT services to address these deficits and reach maximum level of function.       Plan:     Patient to be seen 4 x/week to address the above listed problems via self-care/home management, therapeutic activities, therapeutic exercises  Plan of Care Expires: 07/07/24  Plan of Care Reviewed with: patient, daughter    Subjective     Chief Complaint: daughter stating it maybe a little more difficult to mobilize d/t not receiving pain medication yet   Patient/Family Comments/goals: agreeable to participate in therapy for OT  intervention   Pain/Comfort:  Pain Rating 1: 0/10    Objective:     Communicated with: RN prior to session.  Patient found supine with oxygen, peripheral IV, PureWick, bed alarm, telemetry upon OT entry to room.    General Precautions: Standard, diabetic, fall    Orthopedic Precautions:N/A  Braces: N/A  Respiratory Status: Nasal cannula, flow 2 L/min     Occupational Performance:     Bed Mobility:    Supine > Sit: CGA with increase of time to perform   Scoot EOB: SBA with cues for hand placement for assistance     Functional Mobility/Transfers:  Sit > Stand: CGA   Functional Mobility: CGA, RW   Toilet transfer: CGA, grab bars   Chair Transfer: CGA     Activities of Daily Living:  Toileting: CGA for dynamic standing balance for anterior/posterior momo hygiene   Grooming: CGA, RW for hand washing standing at the sink   Feeding: Set Up       [facility] 6 Click ADL: 17    Treatment & Education:  OT role, plan of care, progression of goals, importance of continued OOB activity, ADL/functional transfer and mobility retraining, discharge recommendation, call don't fall, safety precautions, fall prevention.     Patient left up in chair with all lines intact, call button in reach, RN notified, and daughter present    GOALS:   Multidisciplinary Problems       Occupational Therapy Goals          Problem: Occupational Therapy    Goal Priority Disciplines Outcome Interventions   Occupational Therapy Goal     OT, PT/OT Progressing    Description: Goals to be met by: 7/7/2024     Patient will increase functional independence with ADLs by performing:    UE Dressing with Supervision.  LE Dressing with Stand-by Assistance and Assistive Devices as needed.  Grooming while standing at sink with Stand-by Assistance.  MET 6/24/2024   Toileting from toilet with Supervision for hygiene and clothing management.   Toilet transfer to toilet with Supervision.                         Time Tracking:     OT Date of Treatment: 06/26/24  OT Start Time:  0844  OT Stop Time: 0907  OT Total Time (min): 23 min    Billable Minutes:Self Care/Home Management 23 min    OT/AMANDA: AMANDA     Number of AMANDA visits since last OT visit: 2    6/26/2024

## 2024-06-26 NOTE — NURSING
[id number].  Team notified.  BRAN Ariza advises to continue to hold Lantus.  Order noted and carried out.

## 2024-06-26 NOTE — DISCHARGE SUMMARY
"[facility] [address] Medicine  Discharge Summary      Patient Name: Indira Waters  MRN: [id number]  DIONE: [id number]  Patient Class: IP- Inpatient  Admission Date: 6/20/2024  Hospital Length of Stay: 5 days  Discharge Date: 6/26/2024  Attending Physician: Prasanna Montes MD   Discharging Provider: Prasanna Montes MD  Primary Care Provider: Chun Zapata MD    HPI:   [facility]:    "Ms. Indira Waters is a 79 y.o. female, with PMH of Chronic pain, Chronic airflow limitation, chronic bronchitis, former smoker, CAD, HTN, T2DM, Diabetic polyneuropathy, RA, GERD, MDD, anxiety, fibromyalgia, h/o cervical cancer, obesity, CDK-4, SLE, who presented to [facility] on 6/20/24 from her PCPs office due to shortness of breath that occurs when it rains outside with associated hypoxia in the office. She states she has felt short of breath all week, and that at baseline she has FERRIS, which is now also occurring at rest and is associated with chest tightness. She further notes onset of lower extremity swelling. She denied fever, chills, cough. She does not utilize home O2. ED workup with labs including a CBC which shows anemia with H&H of 10.4/32.0, but no leukocytosis or left shift. A metabolic panel showed RANJEET with BUN 33, and Cr of 1.9 without hyperkalemia. CO2 was 22, and anion gap was 16, glucose was 382. BNP was 132, without elevation of troponin, and with CTX showing no acute cardiopulmonary process. A D-dimer was not elevated. She was treated in the ED with 3 DuoNebs, one hour long neb, and IV steroids. Initially she was maintaining adequate O2 sats on room air. At about midnight her BP jerri to 204/77, when she was apparently reporting pain, and then at about 01:40 she had an acute decline in O2 sats with worsened shortness of breath. An ABG was ordered and showed CO2 retention, she was placed on BiPap. Orders were changed from tele to ICU."    Hospital Course: "   Patient is a 79-year-old woman with chronic pain syndrome, fibromyalgia, chronic bronchitis, former tobacco user, coronary artery disease, hypertension, gastroesophageal reflux disease, major depressive disorder, and chronic kidney disease stage 4 admitted to the hospital with acute respiratory distress.  Patient initially treated for possible acute exacerbation of chronic obstructive pulmonary disease.  Patient failed to improve with treatment for chronic obstructive pulmonary disease. Patient decompensated on the floor was transferred to intensive care unit for rescue BiPAP.    Following further evaluation of his clear that she had flash pulmonary edema from decompensated heart failure with elevated blood pressure.  Patient treated with intravenous diuretic therapy and afterload reduction with marked improvement in her respiratory distress.  Patient improved and weaned off BiPAP.  Patient's supplemental oxygen requirements have also decreased.  Patient subsequently transferred to the floor.      Patient has continued to improve with medical therapy to treat heart failure.  Heart failure regimen adjusted as per cardiology's recommendations.  Kidney function stable.  Patient is quite debilitated from recent acute illness.  Patient also lives on a 2nd floor apartment and with significant difficulty in doing deficit this time.  Further inpatient rehabilitation recommended.  Patient accepted to [facility] inpatient rehab facility for aggressive physical therapy with goal of return patient to her prior functional status.  Efforts of weaning her oxygen off further also can be done at the inpatient rehab facility.    Close outpatient follow up with Dr. Marlon Méndez for ongoing management of heart failure advised after she is discharged from inpatient rehab facility.  Also recommend patient follow up with her primary care physician for ongoing management of her other medical comorbidities.     Goals of Care Treatment  Preferences:  Code Status: Full Code    Health care agent: Esha Iglesias  [facility] care agent number: [phone]                   Consults:   Consults (From admission, onward)          Status Ordering Provider     Inpatient consult to Registered Dietitian/Nutritionist  Once        Provider:  (Not yet assigned)    Completed SERA WELLINGTON     Inpatient consult to Diabetes educator  Once        Provider:  (Not yet assigned)    Completed SERA WELLINGTON     Inpatient consult to Midline team  Once        Provider:  (Not yet assigned)    Completed JARRED WAYNE     Inpatient consult to Orthopedic Surgery  Once        Provider:  Dio Candelaria MD    Completed SERA WELLINGTON     Inpatient consult to Pulmonology  Once        Provider:  Jarred Wayne MD    Completed IVETH BALDWIN              Final Active Diagnoses:    Diagnosis Date Noted POA    PRINCIPAL PROBLEM:  Acute on chronic combined systolic and diastolic congestive heart failure [I50.43] 09/15/2021 Yes    Acute respiratory failure with hypoxia and hypercapnia [J96.01, J96.02] 06/21/2024 Yes    Diabetic ketoacidosis without coma associated with type 2 diabetes mellitus [E11.10] 06/21/2024 Yes    Class 2 obesity with body mass index (BMI) of 39.0 to 39.9 in adult [E66.9, Z68.39] 06/21/2024 Not Applicable    Stage 4 chronic kidney disease [N18.4] 02/27/2024 Yes    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 pectoris [I25.118] 05/04/2022 Yes    Hemiplegia and hemiparesis following cerebral infarction affecting left non-dominant side [I69.354] 09/15/2021 Not Applicable    Essential hypertension [I10] 09/10/2021 Yes     Chronic    Diabetic polyneuropathy [E11.42] 03/12/2019 Yes     Chronic    Chronic pain syndrome [G89.4] 05/22/2014 Yes      Problems Resolved During this Admission:    Diagnosis Date Noted Date Resolved POA    Noncompliance with medication regimen [Z91.148] 07/13/2022 06/25/2024 Not Applicable    Rheumatoid  arthritis, unspecified [M06.9] 11/10/2021 06/25/2024 Yes    Systemic lupus erythematosus, unspecified [M32.9] 09/15/2021 06/25/2024 Yes       Discharged Condition: Stable    Disposition: Rehab Facility    Follow Up: Dr. Marlon Méndez ([facility])    Patient Instructions:      Ambulatory referral/consult to Outpatient Case Management   Referral Priority: Routine Referral Type: Consultation   Referral Reason: Specialty Services Required   Number of Visits Requested: 1     Diet diabetic     Notify your health care provider if you experience any of the following:  temperature >100.4     Notify your health care provider if you experience any of the following:  persistent nausea and vomiting or diarrhea     Notify your health care provider if you experience any of the following:  severe uncontrolled pain     Notify your health care provider if you experience any of the following:  difficulty breathing or increased cough     Notify your health care provider if you experience any of the following:  severe persistent headache     Notify your health care provider if you experience any of the following:  worsening rash     Notify your health care provider if you experience any of the following:  persistent dizziness, light-headedness, or visual disturbances     Activity as tolerated          Medications:  Reconciled Home Medications:      Medication List        START taking these medications      furosemide 40 MG tablet  Commonly known as: LASIX  Take 1 tablet (40 mg total) by mouth once daily.  Start taking on: June 27, 2024     insulin glargine U-100 (Lantus) 100 unit/mL (3 mL) Inpn pen  Inject 20 Units into the skin 2 (two) times daily.     melatonin 3 mg tablet  Commonly known as: MELATIN  Take 2 tablets (6 mg total) by mouth nightly as needed for Insomnia.     miconazole nitrate 2% 2 % Oint  Commonly known as: MICOTIN  Apply topically 2 (two) times daily.     oxyCODONE 5 MG immediate release  tablet  Commonly known as: ROXICODONE  Take 1 tablet (5 mg total) by mouth every 8 (eight) hours as needed for Pain.            CHANGE how you take these medications      * insulin aspart U-100 100 unit/mL (3 mL) Inpn pen  Commonly known as: NovoLOG  Inject 10 Units into the skin 3 (three) times daily.  What changed:   how much to take  when to take this  additional instructions     * insulin aspart U-100 100 unit/mL (3 mL) Inpn pen  Commonly known as: NovoLOG  Inject 0-5 Units into the skin before meals and at bedtime as needed (Hyperglycemia).  What changed: You were already taking a medication with the same name, and this prescription was added. Make sure you understand how and when to take each.     * ipratropium-albuteroL  mcg/actuation inhaler  Commonly known as: CombiVENT  Inhale 1 puff into the lungs by mouth every 6 (six) hours.  What changed: Another medication with the same name was added. Make sure you understand how and when to take each.     * albuterol-ipratropium 2.5 mg-0.5 mg/3 mL nebulizer solution  Commonly known as: DUO-NEB  Take 3 mLs by nebulization every 4 (four) hours as needed for Wheezing. Rescue  What changed: You were already taking a medication with the same name, and this prescription was added. Make sure you understand how and when to take each.     pregabalin 75 MG capsule  Commonly known as: LYRICA  Take 1 capsule (75 mg total) by mouth 3 (three) times daily.  What changed:   medication strength  how much to take  when to take this  additional instructions           * This list has 4 medication(s) that are the same as other medications prescribed for you. Read the directions carefully, and ask your doctor or other care provider to review them with you.                CONTINUE taking these medications      acetaminophen 500 MG tablet  Commonly known as: TYLENOL  Take 2 tablets (1,000 mg total) by mouth every 8 (eight) hours as needed for Pain.     allopurinoL 300 MG  tablet  Commonly known as: ZYLOPRIM  TAKE ONE TABLET BY MOUTH EVERY DAY     aspirin 81 MG EC tablet  Commonly known as: ECOTRIN  Take 1 tablet (81 mg total) by mouth once daily.     blood sugar diagnostic Strp  To check BG 6 times daily, to use with insurance preferred meter     blood-glucose meter kit  To check BG 6 times daily, to use with insurance preferred meter     cloNIDine 0.1 MG tablet  Commonly known as: CATAPRES  Take 1 tablet (0.1 mg total) by mouth 2 (two) times daily.     DEXCOM G7  Misc  Generic drug: blood-glucose meter,continuous  Use as directed.     DEXCOM G7 SENSOR Nicole  Generic drug: blood-glucose sensor  Change every 10 days.     losartan 50 MG tablet  Commonly known as: COZAAR  Take 1 tablet (50 mg total) by mouth once daily.     metoclopramide HCl 5 MG tablet  Commonly known as: REGLAN  TAKE ONE TABLET BY MOUTH FOUR TIMES DAILY AS NEEDED FOR nausea prevention     metoprolol succinate 100 MG 24 hr tablet  Commonly known as: TOPROL-XL  Take 1 tablet (100 mg total) by mouth once daily.     NIFEdipine 30 MG (OSM) 24 hr tablet  Commonly known as: PROCARDIA-XL  Take 1 tablet (30 mg total) by mouth 2 (two) times a day.     sertraline 100 MG tablet  Commonly known as: ZOLOFT  Take 1 tablet (100 mg total) by mouth once daily.     STOOL SOFTENER-STIMULANT LAXAT 8.6-50 mg per tablet  Generic drug: senna-docusate 8.6-50 mg  Take 1 tablet by mouth 2 (two) times daily as needed for Constipation.            STOP taking these medications      atorvastatin 40 MG tablet  Commonly known as: LIPITOR     dicyclomine 10 MG capsule  Commonly known as: BENTYL     fenofibrate micronized 134 MG Cap  Commonly known as: LOFIBRA     fluticasone propionate 50 mcg/actuation nasal spray  Commonly known as: FLONASE     HYDROcodone-acetaminophen  mg per tablet  Commonly known as: NORCO     hydrocortisone 2.5 % rectal cream  Commonly known as: ANUSOL-HC     lancets [facility]     LEVEMIR FLEXTOUCH U100 INSULIN 100  "unit/mL (3 mL) Inpn pen  Generic drug: insulin detemir U-100 (Levemir)     methocarbamoL 500 MG Tab  Commonly known as: ROBAXIN     mupirocin 2 % ointment  Commonly known as: BACTROBAN     nitroGLYCERIN 0.4 MG SL tablet  Commonly known as: NITROSTAT     pantoprazole 40 MG tablet  Commonly known as: PROTONIX     pen needle, diabetic 31 gauge x 1/4" Ndle     pioglitazone 15 MG tablet  Commonly known as: ACTOS     REPATHA SURECLICK 140 mg/mL Pnij  Generic drug: evolocumab     teriparatide 20 mcg/dose (620mcg/2.48mL) Pnij     torsemide 20 MG Tab  Commonly known as: DEMADEX     triamcinolone acetonide 0.1% 0.1 % cream  Commonly known as: KENALOG              Indwelling Lines/Drains at time of discharge:   Lines/Drains/Airways       Drain  Duration             Female External Urinary Catheter w/ Suction 06/20/24 2340 5 days                    Time spent on the discharge of patient: 35 minutes         Prasanna Montes MD  Department of Hospital Medicine  [facility] ([address])  "

## 2024-06-26 NOTE — PLAN OF CARE
CM met with pt and daughter for final discharge planning assessment. Pt will discharge to [facility] rehab today.    Pt and family are in agreement with plan. Daughter will ride with pt in van.Pick-up requested for 8222.    Pt's nurse notified to call report to [phone].    Pt is ready for discharge from  perspective.   06/26/24 1548   Final Note   Assessment Type Final Discharge Note   Anticipated Discharge Disposition Rehab   What phone number can be called within the next 1-3 days to see how you are doing after discharge? [phone]   Hospital Resources/Appts/Education Provided Provided patient/caregiver with written discharge plan information;Provided education on problems/symptoms using teachback;Appointments scheduled and added to AVS   Post-Acute Status   Post-Acute Authorization Placement   Post-Acute Placement Status Set-up Complete/Auth obtained   Patient choice form signed by patient/caregiver List with quality metrics by geographic area provided;List from CMS Compare;List from System Post-Acute Care   Discharge Delays None known at this time     [facility] ([address])  Discharge Final Note    Primary Care Provider: Chun Zapata MD    Expected Discharge Date: 6/26/2024    Final Discharge Note (most recent)       Final Note - 06/26/24 1548          Final Note    Assessment Type Final Discharge Note (P)      Anticipated Discharge Disposition Rehab Facility (P)      What phone number can be called within the next 1-3 days to see how you are doing after discharge? [phone] (P)      Hospital Resources/Appts/Education Provided Provided patient/caregiver with written discharge plan information;Provided education on problems/symptoms using teachback;Appointments scheduled and added to AVS (P)         Post-Acute Status    Post-Acute Authorization Placement (P)      Post-Acute Placement Status Set-up Complete/Auth obtained (P)      Patient choice form signed by patient/caregiver List with  quality metrics by geographic area provided;List from CMS Compare;List from System Post-Acute Care (P)      Discharge Delays None known at this time (P)                      Important Message from Medicare

## 2024-06-26 NOTE — PLAN OF CARE
Medicare Message     Important Message from Medicare regarding Discharge Appeal Rights Given to patient/caregiver; Explained to patient/caregiver; Signed/date by patient/caregiver   Date IMM was signed 6/26/2024   Time IMM was signed 0945

## 2024-06-26 NOTE — PLAN OF CARE
Inpatient Rehab    06/26/2024  [facility] LOCATION ([facility])  [facility] ([address])  [address]  [place] [address]  Dept: [phone]  Loc: [phone]     Admit to Nursing Home:  Rehab Facility    Diagnoses:  Active Hospital Problems    Diagnosis  POA    *Acute on chronic combined systolic and diastolic congestive heart failure [I50.43]  Yes     Priority: 1 - High    Acute respiratory failure with hypoxia and hypercapnia [J96.01, J96.02]  Yes     Priority: 3     Diabetic ketoacidosis without coma associated with type 2 diabetes mellitus [E11.10]  Yes    Class 2 obesity with body mass index (BMI) of 39.0 to 39.9 in adult [E66.9, Z68.39]  Not Applicable    Stage 4 chronic kidney disease [N18.4]  Yes    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 pectoris [I25.118]  Yes    Hemiplegia and hemiparesis following cerebral infarction affecting left non-dominant side [I69.354]  Not Applicable    Essential hypertension [I10]  Yes     Chronic    Diabetic polyneuropathy [E11.42]  Yes     Chronic    Chronic pain syndrome [G89.4]  Yes      Resolved Hospital Problems    Diagnosis Date Resolved POA    Noncompliance with medication regimen [Z91.148] 06/25/2024 Not Applicable    Rheumatoid arthritis, unspecified [M06.9] 06/25/2024 Yes    Systemic lupus erythematosus, unspecified [M32.9] 06/25/2024 Yes       Allergies:  Review of patient's allergies indicates:   Allergen Reactions    Bleach (sodium hypochlorite) Shortness Of Breath    Nitrofurantoin macrocrystalline Anaphylaxis    Lipitor [atorvastatin] Diarrhea and Rash    Nsaids (non-steroidal anti-inflammatory drug)      Tolerates aspirin      Pcn [penicillins]     Toradol [ketorolac]        Vitals:  Routine    Diet: diabetic diet: 2000 calorie    Activities:   Activity as tolerated with assistance     Supplemental Oxygen: Wean as possible keeping oxygen saturation > 92 percent    Goals of Care Treatment  Preferences:  Code Status: Full Code  Health care agent: Esah Iglesias  [facility] care agent number: [phone]      Nursing Precautions:  Aspiration , Fall, and Pressure ulcer prevention    Consults:   PT to evaluate and treat- 5 times a week and OT to evaluate and treat- 5 times a week     Wound Care: Wound care: Back wound - clean with Vashe, pat dry and apply mepilex border dressing.                    Diabetes Care:  SN to perform and educate Diabetic management with blood glucose monitoring:, Fingerstick blood sugar AC and HS, and Report CBG < 60 or > 350 to physician.      Medications: Discontinue all previous medication orders, if any. See new list below.     Medication List        START taking these medications      furosemide 40 MG tablet  Commonly known as: LASIX  Take 1 tablet (40 mg total) by mouth once daily.  Start taking on: June 27, 2024     insulin glargine U-100 (Lantus) 100 unit/mL (3 mL) Inpn pen  Inject 20 Units into the skin 2 (two) times daily.     melatonin 3 mg tablet  Commonly known as: MELATIN  Take 2 tablets (6 mg total) by mouth nightly as needed for Insomnia.     miconazole nitrate 2% 2 % Oint  Commonly known as: MICOTIN  Apply topically 2 (two) times daily.     oxyCODONE 5 MG immediate release tablet  Commonly known as: ROXICODONE  Take 1 tablet (5 mg total) by mouth every 8 (eight) hours as needed for Pain.            CHANGE how you take these medications      * insulin aspart U-100 100 unit/mL (3 mL) Inpn pen  Commonly known as: NovoLOG  Inject 10 Units into the skin 3 (three) times daily.  What changed:   how much to take  when to take this  additional instructions     * insulin aspart U-100 100 unit/mL (3 mL) Inpn pen  Commonly known as: NovoLOG  Inject 0-5 Units into the skin before meals and at bedtime as needed (Hyperglycemia).  What changed: You were already taking a medication with the same name, and this prescription was added. Make sure you understand how and when to take  each.     * ipratropium-albuteroL  mcg/actuation inhaler  Commonly known as: CombiVENT  Inhale 1 puff into the lungs by mouth every 6 (six) hours.  What changed: Another medication with the same name was added. Make sure you understand how and when to take each.     * albuterol-ipratropium 2.5 mg-0.5 mg/3 mL nebulizer solution  Commonly known as: DUO-NEB  Take 3 mLs by nebulization every 4 (four) hours as needed for Wheezing. Rescue  What changed: You were already taking a medication with the same name, and this prescription was added. Make sure you understand how and when to take each.     pregabalin 75 MG capsule  Commonly known as: LYRICA  Take 1 capsule (75 mg total) by mouth 3 (three) times daily.  What changed:   medication strength  how much to take  when to take this  additional instructions           * This list has 4 medication(s) that are the same as other medications prescribed for you. Read the directions carefully, and ask your doctor or other care provider to review them with you.                CONTINUE taking these medications      acetaminophen 500 MG tablet  Commonly known as: TYLENOL  Take 2 tablets (1,000 mg total) by mouth every 8 (eight) hours as needed for Pain.     allopurinoL 300 MG tablet  Commonly known as: ZYLOPRIM  TAKE ONE TABLET BY MOUTH EVERY DAY     aspirin 81 MG EC tablet  Commonly known as: ECOTRIN  Take 1 tablet (81 mg total) by mouth once daily.     blood sugar diagnostic Strp  To check BG 6 times daily, to use with insurance preferred meter     blood-glucose meter kit  To check BG 6 times daily, to use with insurance preferred meter     cloNIDine 0.1 MG tablet  Commonly known as: CATAPRES  Take 1 tablet (0.1 mg total) by mouth 2 (two) times daily.     DEXCOM G7  Misc  Generic drug: blood-glucose meter,continuous  Use as directed.     DEXCOM G7 SENSOR Nicole  Generic drug: blood-glucose sensor  Change every 10 days.     losartan 50 MG tablet  Commonly known as:  "COZAAR  Take 1 tablet (50 mg total) by mouth once daily.     metoclopramide HCl 5 MG tablet  Commonly known as: REGLAN  TAKE ONE TABLET BY MOUTH FOUR TIMES DAILY AS NEEDED FOR nausea prevention     metoprolol succinate 100 MG 24 hr tablet  Commonly known as: TOPROL-XL  Take 1 tablet (100 mg total) by mouth once daily.     NIFEdipine 30 MG (OSM) 24 hr tablet  Commonly known as: PROCARDIA-XL  Take 1 tablet (30 mg total) by mouth 2 (two) times a day.     sertraline 100 MG tablet  Commonly known as: ZOLOFT  Take 1 tablet (100 mg total) by mouth once daily.     STOOL SOFTENER-STIMULANT LAXAT 8.6-50 mg per tablet  Generic drug: senna-docusate 8.6-50 mg  Take 1 tablet by mouth 2 (two) times daily as needed for Constipation.            STOP taking these medications      atorvastatin 40 MG tablet  Commonly known as: LIPITOR     dicyclomine 10 MG capsule  Commonly known as: BENTYL     fenofibrate micronized 134 MG Cap  Commonly known as: LOFIBRA     fluticasone propionate 50 mcg/actuation nasal spray  Commonly known as: FLONASE     HYDROcodone-acetaminophen  mg per tablet  Commonly known as: NORCO     hydrocortisone 2.5 % rectal cream  Commonly known as: ANUSOL-HC     lancets Misc     LEVEMIR FLEXTOUCH U100 INSULIN 100 unit/mL (3 mL) Inpn pen  Generic drug: insulin detemir U-100 (Levemir)     methocarbamoL 500 MG Tab  Commonly known as: ROBAXIN     mupirocin 2 % ointment  Commonly known as: BACTROBAN     nitroGLYCERIN 0.4 MG SL tablet  Commonly known as: NITROSTAT     pantoprazole 40 MG tablet  Commonly known as: PROTONIX     pen needle, diabetic 31 gauge x 1/4" Ndle     pioglitazone 15 MG tablet  Commonly known as: ACTOS     REPATHA SURECLICK 140 mg/mL Pnij  Generic drug: evolocumab     teriparatide 20 mcg/dose (620mcg/2.48mL) Pnij     torsemide 20 MG Tab  Commonly known as: DEMADEX     triamcinolone acetonide 0.1% 0.1 % cream  Commonly known as: KENALOG                Immunizations Administered as of 6/26/2024       " No immunizations on file.            Prasanna Montes MD  06/26/2024

## 2024-06-26 NOTE — PLAN OF CARE
POC reviewed with patient this shift.  Remains A/O x4.  Respirations unlabored on 1L/NC.  Skin w/d.  External catheter in place with momo-care provided per staff.  Pt continues to c/o general chronic pain.  X2 admin of PRN Oxy this shift.  Tolerates meds whole with water without difficulty.  CBG monitoring in progress but continues to have issues with blood sugars with Lanuts held this PM shift. Pt remains asymptomatic.  See previous note.  VSS.  See flowsheet for full assessment.  Able to verbalize wants/needs.  No s/s of distress.  Fall/safety precautions maintained.      Problem: Adult Inpatient Plan of Care  Goal: Plan of Care Review  Outcome: Progressing     Problem: COPD (Chronic Obstructive Pulmonary Disease)  Goal: Effective Oxygenation and Ventilation  Outcome: Progressing     Problem: Fall Injury Risk  Goal: Absence of Fall and Fall-Related Injury  Outcome: Progressing     Problem: Diabetes Comorbidity  Goal: Blood Glucose Level Within Targeted Range  Outcome: Not Progressing  Intervention: Monitor and Manage Glycemia  Flowsheets (Taken 6/26/2024 2863)  Glycemic Management: blood glucose monitored

## 2024-06-28 ENCOUNTER — HOSPITAL ENCOUNTER (OUTPATIENT)
Dept: RADIOLOGY | Facility: HOSPITAL | Age: 79
Discharge: HOME | End: 2024-06-28
Attending: NURSE PRACTITIONER
Payer: MEDICAID

## 2024-06-28 LAB
BACTERIA BLD CULT: NORMAL
BACTERIA BLD CULT: NORMAL

## 2024-07-01 ENCOUNTER — TELEPHONE (OUTPATIENT)
Dept: ENDOCRINOLOGY | Facility: CLINIC | Age: 79
End: 2024-07-01
Payer: MEDICAID

## 2024-07-01 DIAGNOSIS — M81.0 AGE-RELATED OSTEOPOROSIS WITHOUT CURRENT PATHOLOGICAL FRACTURE: ICD-10-CM

## 2024-07-01 RX ORDER — TERIPARATIDE 250 UG/ML
20 INJECTION, SOLUTION SUBCUTANEOUS DAILY
Qty: 2.48 ML | Refills: 2 | Status: ACTIVE | OUTPATIENT
Start: 2024-07-01

## 2024-07-02 DIAGNOSIS — E79.0 HYPERURICEMIA: ICD-10-CM

## 2024-07-02 RX ORDER — ALLOPURINOL 300 MG/1
300 TABLET ORAL DAILY
Qty: 90 TABLET | Refills: 3 | Status: SHIPPED | OUTPATIENT
Start: 2024-07-02

## 2024-07-02 NOTE — TELEPHONE ENCOUNTER
Refill Routing Note   Medication(s) are not appropriate for processing by [facility] for the following reason(s):        Drug-disease interaction    [facility] action(s):  Defer        Medication Therapy Plan: allopurinoL and Stage 3 chronic kidney disease; Stage 3b chronic kidney disease    Pharmacist review requested: Yes   Extended chart review required: Yes     Appointments  past 12m or future 3m with PCP    Date Provider   Last Visit   6/20/2024 Chun Zapata MD   Next Visit   8/23/2024 Chun Zapata MD   ED visits in past 90 days: 0        Note composed:11:06 AM 07/02/2024            Detail Level: Zone

## 2024-07-02 NOTE — TELEPHONE ENCOUNTER
No care due was identified.  [facility] Embedded Care Due Messages. Reference number: [id number].   7/02/2024 10:18:56 AM CDT

## 2024-07-02 NOTE — TELEPHONE ENCOUNTER
Refill Decision Note   nIdira Waters  is requesting a refill authorization.  Brief Assessment and Rationale for Refill:  Approve     Medication Therapy Plan:        Pharmacist review requested: Yes   Extended chart review required: Yes   Comments:     Note composed:5:49 PM 07/02/2024

## 2024-07-03 DIAGNOSIS — Z71.89 COMPLEX CARE COORDINATION: ICD-10-CM

## 2024-07-03 DIAGNOSIS — I25.84 CORONARY ARTERY CALCIFICATION: ICD-10-CM

## 2024-07-03 DIAGNOSIS — E78.01 FAMILIAL HYPERCHOLESTEROLEMIA: ICD-10-CM

## 2024-07-03 DIAGNOSIS — I25.10 CORONARY ARTERY CALCIFICATION: ICD-10-CM

## 2024-07-03 DIAGNOSIS — I25.118 CORONARY ARTERY DISEASE OF NATIVE ARTERY OF NATIVE HEART WITH STABLE ANGINA PECTORIS: ICD-10-CM

## 2024-07-03 RX ORDER — EVOLOCUMAB 140 MG/ML
140 INJECTION, SOLUTION SUBCUTANEOUS
Qty: 2 EACH | Refills: 11 | Status: SHIPPED | OUTPATIENT
Start: 2024-07-03

## 2024-07-09 ENCOUNTER — TELEPHONE (OUTPATIENT)
Dept: ADMINISTRATIVE | Facility: CLINIC | Age: 79
End: 2024-07-09
Payer: MEDICARE

## 2024-07-09 NOTE — TELEPHONE ENCOUNTER
Called pt twice and got a message both times saying my call could not be completed; I was calling to confirm pt's 7/10/24 eawv appt

## 2024-07-10 ENCOUNTER — OFFICE VISIT (OUTPATIENT)
Dept: INTERNAL MEDICINE | Facility: CLINIC | Age: 79
End: 2024-07-10
Payer: MEDICARE

## 2024-07-10 VITALS
DIASTOLIC BLOOD PRESSURE: 82 MMHG | HEART RATE: 98 BPM | WEIGHT: 197.56 LBS | HEIGHT: 60 IN | SYSTOLIC BLOOD PRESSURE: 124 MMHG | BODY MASS INDEX: 38.79 KG/M2 | OXYGEN SATURATION: 95 %

## 2024-07-10 DIAGNOSIS — N18.32 STAGE 3B CHRONIC KIDNEY DISEASE: ICD-10-CM

## 2024-07-10 DIAGNOSIS — F33.40 RECURRENT MAJOR DEPRESSIVE DISORDER, IN REMISSION: ICD-10-CM

## 2024-07-10 DIAGNOSIS — I50.43 ACUTE ON CHRONIC COMBINED SYSTOLIC AND DIASTOLIC CONGESTIVE HEART FAILURE: ICD-10-CM

## 2024-07-10 DIAGNOSIS — K21.9 GASTROESOPHAGEAL REFLUX DISEASE WITHOUT ESOPHAGITIS: Chronic | ICD-10-CM

## 2024-07-10 DIAGNOSIS — E11.22 TYPE 2 DIABETES MELLITUS WITH CHRONIC KIDNEY DISEASE, WITH LONG-TERM CURRENT USE OF INSULIN, UNSPECIFIED CKD STAGE: ICD-10-CM

## 2024-07-10 DIAGNOSIS — Z79.4 TYPE 2 DIABETES MELLITUS WITH CHRONIC KIDNEY DISEASE, WITH LONG-TERM CURRENT USE OF INSULIN, UNSPECIFIED CKD STAGE: ICD-10-CM

## 2024-07-10 DIAGNOSIS — N20.0 CALCULUS OF KIDNEY: ICD-10-CM

## 2024-07-10 DIAGNOSIS — D69.2 SENILE PURPURA: ICD-10-CM

## 2024-07-10 DIAGNOSIS — J96.02 ACUTE RESPIRATORY FAILURE WITH HYPOXIA AND HYPERCAPNIA: ICD-10-CM

## 2024-07-10 DIAGNOSIS — F41.1 GENERALIZED ANXIETY DISORDER: ICD-10-CM

## 2024-07-10 DIAGNOSIS — I25.10 CORONARY ARTERY CALCIFICATION: Chronic | ICD-10-CM

## 2024-07-10 DIAGNOSIS — R94.31 ABNORMAL ELECTROCARDIOGRAM: ICD-10-CM

## 2024-07-10 DIAGNOSIS — I69.354 HEMIPLEGIA AND HEMIPARESIS FOLLOWING CEREBRAL INFARCTION AFFECTING LEFT NON-DOMINANT SIDE: ICD-10-CM

## 2024-07-10 DIAGNOSIS — Z87.442 HISTORY OF RENAL CALCULI: ICD-10-CM

## 2024-07-10 DIAGNOSIS — I10 ESSENTIAL HYPERTENSION: Chronic | ICD-10-CM

## 2024-07-10 DIAGNOSIS — I25.84 CORONARY ARTERY CALCIFICATION: Chronic | ICD-10-CM

## 2024-07-10 DIAGNOSIS — E11.10 DIABETIC KETOACIDOSIS WITHOUT COMA ASSOCIATED WITH TYPE 2 DIABETES MELLITUS: ICD-10-CM

## 2024-07-10 DIAGNOSIS — Z87.891 FORMER SMOKER: ICD-10-CM

## 2024-07-10 DIAGNOSIS — E78.01 FAMILIAL HYPERCHOLESTEROLEMIA: Chronic | ICD-10-CM

## 2024-07-10 DIAGNOSIS — D69.0 IGA MEDIATED LEUKOCYTOCLASTIC VASCULITIS: ICD-10-CM

## 2024-07-10 DIAGNOSIS — Z00.00 ENCOUNTER FOR PREVENTIVE HEALTH EXAMINATION: Primary | ICD-10-CM

## 2024-07-10 DIAGNOSIS — E66.9 CLASS 2 OBESITY WITH BODY MASS INDEX (BMI) OF 39.0 TO 39.9 IN ADULT, UNSPECIFIED OBESITY TYPE, UNSPECIFIED WHETHER SERIOUS COMORBIDITY PRESENT: ICD-10-CM

## 2024-07-10 DIAGNOSIS — G89.4 CHRONIC PAIN SYNDROME: Chronic | ICD-10-CM

## 2024-07-10 DIAGNOSIS — E66.01 CLASS 2 SEVERE OBESITY DUE TO EXCESS CALORIES WITH SERIOUS COMORBIDITY AND BODY MASS INDEX (BMI) OF 37.0 TO 37.9 IN ADULT: ICD-10-CM

## 2024-07-10 DIAGNOSIS — I25.118 CORONARY ARTERY DISEASE OF NATIVE ARTERY OF NATIVE HEART WITH STABLE ANGINA PECTORIS: ICD-10-CM

## 2024-07-10 DIAGNOSIS — M17.11 PRIMARY OSTEOARTHRITIS OF RIGHT KNEE: ICD-10-CM

## 2024-07-10 DIAGNOSIS — I77.1 TORTUOUS AORTA: ICD-10-CM

## 2024-07-10 DIAGNOSIS — Z00.00 ENCOUNTER FOR MEDICARE ANNUAL WELLNESS EXAM: ICD-10-CM

## 2024-07-10 DIAGNOSIS — M06.9 RHEUMATOID ARTHRITIS, INVOLVING UNSPECIFIED SITE, UNSPECIFIED WHETHER RHEUMATOID FACTOR PRESENT: ICD-10-CM

## 2024-07-10 DIAGNOSIS — N18.30 STAGE 3 CHRONIC KIDNEY DISEASE, UNSPECIFIED WHETHER STAGE 3A OR 3B CKD: ICD-10-CM

## 2024-07-10 DIAGNOSIS — T45.8X5S ADVERSE EFFECT OF BISPHOSPHONATE, SEQUELA: ICD-10-CM

## 2024-07-10 DIAGNOSIS — M54.15 RADICULOPATHY OF THORACOLUMBAR REGION: ICD-10-CM

## 2024-07-10 DIAGNOSIS — J96.01 ACUTE RESPIRATORY FAILURE WITH HYPOXIA AND HYPERCAPNIA: ICD-10-CM

## 2024-07-10 DIAGNOSIS — Z91.199 NONCOMPLIANCE: ICD-10-CM

## 2024-07-10 DIAGNOSIS — J41.1 MUCOPURULENT CHRONIC BRONCHITIS: ICD-10-CM

## 2024-07-10 DIAGNOSIS — R04.2 HEMOPTYSIS: ICD-10-CM

## 2024-07-10 DIAGNOSIS — Z85.41 HISTORY OF CERVICAL CANCER: ICD-10-CM

## 2024-07-10 DIAGNOSIS — I25.10 ATHSCL HEART DISEASE OF NATIVE CORONARY ARTERY W/O ANG PCTRS: ICD-10-CM

## 2024-07-10 DIAGNOSIS — M79.2 NEUROPATHIC PAIN: ICD-10-CM

## 2024-07-10 DIAGNOSIS — E55.9 VITAMIN D DEFICIENCY: ICD-10-CM

## 2024-07-10 DIAGNOSIS — N18.4 STAGE 4 CHRONIC KIDNEY DISEASE: ICD-10-CM

## 2024-07-10 DIAGNOSIS — F32.A DEPRESSION, UNSPECIFIED DEPRESSION TYPE: Chronic | ICD-10-CM

## 2024-07-10 DIAGNOSIS — J44.9 CAFL (CHRONIC AIRFLOW LIMITATION): ICD-10-CM

## 2024-07-10 DIAGNOSIS — R80.9 PROTEINURIA, UNSPECIFIED TYPE: ICD-10-CM

## 2024-07-10 DIAGNOSIS — I70.0 AORTIC ATHEROSCLEROSIS: ICD-10-CM

## 2024-07-10 DIAGNOSIS — E11.42 DIABETIC POLYNEUROPATHY ASSOCIATED WITH TYPE 2 DIABETES MELLITUS: Chronic | ICD-10-CM

## 2024-07-10 DIAGNOSIS — H91.90 HEARING LOSS, UNSPECIFIED HEARING LOSS TYPE, UNSPECIFIED LATERALITY: ICD-10-CM

## 2024-07-10 DIAGNOSIS — M79.7 FIBROMYALGIA: Chronic | ICD-10-CM

## 2024-07-10 DIAGNOSIS — R13.10 DYSPHAGIA, UNSPECIFIED TYPE: ICD-10-CM

## 2024-07-10 DIAGNOSIS — M81.0 AGE-RELATED OSTEOPOROSIS WITHOUT CURRENT PATHOLOGICAL FRACTURE: ICD-10-CM

## 2024-07-10 DIAGNOSIS — Z71.89 ADVANCED CARE PLANNING/COUNSELING DISCUSSION: ICD-10-CM

## 2024-07-10 PROCEDURE — 99215 OFFICE O/P EST HI 40 MIN: CPT | Mod: PBBFAC | Performed by: NURSE PRACTITIONER

## 2024-07-10 PROCEDURE — 99999 PR PBB SHADOW E&M-EST. PATIENT-LVL V: CPT | Mod: PBBFAC,,, | Performed by: NURSE PRACTITIONER

## 2024-07-10 RX ORDER — BLOOD-GLUCOSE CONTROL, NORMAL
EACH MISCELLANEOUS
Qty: 400 EACH | Refills: 2 | Status: SHIPPED | OUTPATIENT
Start: 2024-07-10

## 2024-07-10 NOTE — PROGRESS NOTES
Indira Waters presented for an initial Medicare AWV today. The following components were reviewed and updated:    Medical history  Family History  Social history  Allergies and Current Medications  Health Risk Assessment  Health Maintenance  Care Team    **See Completed Assessments for Annual Wellness visit with in the encounter summary    The following assessments were completed:  Depression Screening  Cognitive function Screening  Timed Get Up Test  Whisper Test      Opioid documentation:      Patient does not have a current opioid prescription.   {Stop here if answer is 'does not'. (This text will automatically delete.) :10303}       There were no vitals filed for this visit.  There is no height or weight on file to calculate BMI.       Physical Exam  Constitutional:       Appearance: Normal appearance.   Pulmonary:      Effort: Pulmonary effort is normal.      Breath sounds: Normal breath sounds.   Neurological:      Mental Status: She is alert.           Diagnoses and health risks identified today and associated recommendations/orders:  1. Encounter for preventive health examination  Annual Health Risk Assessment (HRA) visit today.  Counseling and referral of health maintenance and preventative health measures performed.  Patient given annual wellness paperwork to take home.  Encouraged to return in 1 year for subsequent HRA visit.     2. Tortuous aorta   Chronic. Stable. Continue current treatment plan as previously prescribed by PCP.    3. Type 2 diabetes mellitus with chronic kidney disease, with long-term current use of insulin, unspecified CKD stage  Chronic. Stable. Uncontrolled. Last Hgb A1c=9.5 from 6/5/24. Continue current treatment plan as previously prescribed by PCP.    4. Diabetic ketoacidosis without coma associated with type 2 diabetes mellitus  Chronic. Stable. Continue current treatment plan as previously prescribed by PCP.    5. Class 2 severe obesity due to excess calories with serious  comorbidity and body mass index (BMI) of 37.0 to 37.9 in adult  Chronic. Stable. Encouraged to increase exercise as tolerated and improve diet to heart healthy, low sodium diet. Continue current treatment plan as previously prescribed by PCP.    6. Class 2 obesity with body mass index (BMI) of 39.0 to 39.9 in adult, unspecified obesity type, unspecified whether serious comorbidity present  Chronic. Stable. Continue current treatment plan as previously prescribed by PCP.    7. Age-related osteoporosis without current pathological fracture  Chronic. Stable. Continue current treatment plan as previously prescribed by PCP.    8. Gastroesophageal reflux disease without esophagitis  Chronic. Stable. Continue current treatment plan as previously prescribed by PCP.    9. Dysphagia, unspecified type  Chronic. Stable. Continue current treatment plan as previously prescribed by PCP.    10. Primary osteoarthritis of right knee  Chronic. Stable. Continue current treatment plan as previously prescribed by PCP.    11. Fibromyalgia  Chronic. Stable. Continue current treatment plan as previously prescribed by PCP.    12. Noncompliance  Chronic. Stable. Continue current treatment plan as previously prescribed by PCP.    13. Advanced care planning/counseling discussion  Chronic. Stable. Continue current treatment plan as previously prescribed by PCP.    14. Former smoker  Chronic. Stable. Continue current treatment plan as previously prescribed by PCP.    15. Adverse effect of bisphosphonate, sequela  Chronic. Stable. Continue current treatment plan as previously prescribed by PCP.    16. History of cervical cancer  Chronic. Stable. Continue current treatment plan as previously prescribed by PCP.    17. Vitamin D deficiency  Chronic. Stable. Continue current treatment plan as previously prescribed by PCP.    18. Rheumatoid arthritis, involving unspecified site, unspecified whether rheumatoid factor present  Chronic. Stable. Continue  current treatment plan as previously prescribed by PCP.    19. IgA mediated leukocytoclastic vasculitis  Chronic. Stable. Continue current treatment plan as previously prescribed by PCP.    20. Proteinuria, unspecified type  Chronic. Stable. Continue current treatment plan as previously prescribed by PCP.    21. Calculus of kidney  Chronic. Stable. Continue current treatment plan as previously prescribed by PCP.    22. History of renal calculi  Chronic. Stable. Continue current treatment plan as previously prescribed by PCP.    23. Stage 3 chronic kidney disease, unspecified whether stage 3a or 3b CKD  Chronic. Stable. Continue current treatment plan as previously prescribed by PCP.    24. Stage 4 chronic kidney disease  Chronic. Stable. Continue current treatment plan as previously prescribed by PCP.    25. Stage 3b chronic kidney disease  Chronic. Stable. Continue current treatment plan as previously prescribed by PCP.    26. Abnormal electrocardiogram  Chronic. Stable. Continue current treatment plan as previously prescribed by PCP.    27. Acute on chronic combined systolic and diastolic congestive heart failure  Chronic. Stable. Continue current treatment plan as previously prescribed by PCP.    28. Aortic atherosclerosis  Chronic. Stable. Continue current treatment plan as previously prescribed by PCP.    29. Athscl heart disease of native coronary artery w/o ang pctrs  Chronic. Stable. Continue current treatment plan as previously prescribed by PCP.    30. Coronary artery calcification  Chronic. Stable. Continue current treatment plan as previously prescribed by PCP.    31. 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 pectoris  Chronic. Stable. Continue current treatment plan as previously prescribed by PCP.    32. Essential hypertension  Chronic. Stable. Controlled. Encouraged to increase exercise as tolerated (moderate-intensity aerobic activity and muscle-strengthening  activities) improve diet to heart healthy, low sodium diet.  Continue current treatment plan as previously prescribed by PCP.    33. Familial hypercholesterolemia  Chronic. Stable. Continue current treatment plan as previously prescribed by PCP.    34. Acute respiratory failure with hypoxia and hypercapnia  Chronic. Stable. Continue current treatment plan as previously prescribed by PCP.    35. CAFL (chronic airflow limitation)  Chronic. Stable. Continue current treatment plan as previously prescribed by PCP.    36. Hemoptysis  Chronic. Stable. Continue current treatment plan as previously prescribed by PCP.    37. Mucopurulent chronic bronchitis  Chronic. Stable. Continue current treatment plan as previously prescribed by PCP.    38. Generalized anxiety disorder  Chronic. Stable. Continue current treatment plan as previously prescribed by PCP.    39. Depression, unspecified depression type  Chronic. Stable. Continue current treatment plan as previously prescribed by PCP.    40. Recurrent major depressive disorder, in remission  Chronic. Stable. Continue current treatment plan as previously prescribed by PCP.    41. Chronic pain syndrome  Chronic. Stable. Continue current treatment plan as previously prescribed by PCP.    42. Diabetic polyneuropathy associated with type 2 diabetes mellitus  Chronic. Stable. Continue current treatment plan as previously prescribed by PCP.    43. Hemiplegia and hemiparesis following cerebral infarction affecting left non-dominant side  Chronic. Stable. Continue current treatment plan as previously prescribed by PCP.    44. Neuropathic pain  Chronic. Stable. Continue current treatment plan as previously prescribed by PCP.    45. Radiculopathy of thoracolumbar region  Chronic. Stable. Continue current treatment plan as previously prescribed by PCP.    46. Encounter for Medicare annual wellness exam  Chronic. Stable. Continue current treatment plan as previously prescribed by PCP.  -  Ambulatory Referral/Consult to Enhanced Annual Wellness Visit (eAWV)        Provided Indira with a 5-10 year written screening schedule and personal prevention plan. Recommendations were developed using the USPSTF age appropriate recommendations. Education, counseling, and referrals were provided as needed.  After Visit Summary printed and given to patient which includes a list of additional screenings\tests needed.    No follow-ups on file.      Jax Gibbs NP

## 2024-07-10 NOTE — PROGRESS NOTES
Indira Waters presented for an initial Medicare AWV today. The following components were reviewed and updated:    Medical history  Family History  Social history  Allergies and Current Medications  Health Risk Assessment  Health Maintenance  Care Team    **See Completed Assessments for Annual Wellness visit with in the encounter summary    The following assessments were completed:  Depression Screening  Cognitive function Screening  Timed Get Up Test  Whisper Test      Opioid documentation:      Patient does not have a current opioid prescription.          Non-opioid treatment options have been discussed today and added to the patient's after visit summary.              Vitals:    07/10/24 1450   BP: 124/82   BP Location: Right arm   Patient Position: Sitting   BP Method: Large (Manual)   Pulse: 98   SpO2: 95%   Weight: 89.6 kg (197 lb 8.5 oz)   Height: 5' (1.524 m)     Body mass index is 38.58 kg/m².       Physical Exam  Constitutional:       Appearance: Normal appearance.   Pulmonary:      Effort: Pulmonary effort is normal.      Breath sounds: Normal breath sounds.   Neurological:      Mental Status: She is alert.           Diagnoses and health risks identified today and associated recommendations/orders:    1. Encounter for preventive health examination  Annual Health Risk Assessment (HRA) visit today.  Counseling and referral of health maintenance and preventative health measures performed.  Patient given annual wellness paperwork to take home.  Encouraged to return in 1 year for subsequent HRA visit.   Patient is not on chronic opioids. Risk factors reviewed for any misuse and/or abuse. Pain assessed during visit. Patient being treated and followed by PCP.    2. Tortuous aorta   Chronic. Stable. Continue current treatment plan as previously prescribed by PCP.    3. Type 2 diabetes mellitus with chronic kidney disease, with long-term current use of insulin, unspecified CKD stage  Chronic. Stable. Uncontrolled.  Last Hgb A1c=9.5 from 6/5/24. Continue current treatment plan as previously prescribed by PCP.    4. Diabetic ketoacidosis without coma associated with type 2 diabetes mellitus  Chronic. Stable. Continue current treatment plan as previously prescribed by PCP.    5. Class 2 severe obesity due to excess calories with serious comorbidity and body mass index (BMI) of 37.0 to 37.9 in adult  Chronic. Stable. Encouraged to increase exercise as tolerated and improve diet to heart healthy, low sodium diet. Continue current treatment plan as previously prescribed by PCP.    6. Class 2 obesity with body mass index (BMI) of 39.0 to 39.9 in adult, unspecified obesity type, unspecified whether serious comorbidity present  Chronic. Stable. Continue current treatment plan as previously prescribed by PCP.    7. Age-related osteoporosis without current pathological fracture  Chronic. Stable. Continue current treatment plan as previously prescribed by PCP.    8. Gastroesophageal reflux disease without esophagitis  Chronic. Stable. Continue current treatment plan as previously prescribed by PCP.    9. Dysphagia, unspecified type  Chronic. Stable. Continue current treatment plan as previously prescribed by PCP.    10. Primary osteoarthritis of right knee  Chronic. Stable. Continue current treatment plan as previously prescribed by PCP.    11. Fibromyalgia  Chronic. Stable. Continue current treatment plan as previously prescribed by PCP.    12. Noncompliance  Chronic. Stable. Continue current treatment plan as previously prescribed by PCP.    13. Advanced care planning/counseling discussion  Chronic. Stable. Continue current treatment plan as previously prescribed by PCP.    14. Former smoker  Chronic. Stable. Continue current treatment plan as previously prescribed by PCP.    15. Adverse effect of bisphosphonate, sequela  Chronic. Stable. Continue current treatment plan as previously prescribed by PCP.    16. History of cervical  cancer  Chronic. Stable. Continue current treatment plan as previously prescribed by PCP.    17. Vitamin D deficiency  Chronic. Stable. Continue current treatment plan as previously prescribed by PCP.    18. Rheumatoid arthritis, involving unspecified site, unspecified whether rheumatoid factor present  Chronic. Stable. Continue current treatment plan as previously prescribed by PCP.    19. IgA mediated leukocytoclastic vasculitis  Chronic. Stable. Continue current treatment plan as previously prescribed by PCP.    20. Proteinuria, unspecified type  Chronic. Stable. Continue current treatment plan as previously prescribed by PCP.    21. Calculus of kidney  Chronic. Stable. Continue current treatment plan as previously prescribed by PCP.    22. History of renal calculi  Chronic. Stable. Continue current treatment plan as previously prescribed by PCP.    23. Stage 3 chronic kidney disease, unspecified whether stage 3a or 3b CKD  Chronic. Stable. Continue current treatment plan as previously prescribed by PCP.    24. Stage 4 chronic kidney disease  Chronic. Stable. Continue current treatment plan as previously prescribed by PCP.    25. Stage 3b chronic kidney disease  Chronic. Stable. Continue current treatment plan as previously prescribed by PCP.    26. Abnormal electrocardiogram  Chronic. Stable. Continue current treatment plan as previously prescribed by PCP.    27. Acute on chronic combined systolic and diastolic congestive heart failure  Chronic. Stable. Continue current treatment plan as previously prescribed by PCP.    28. Aortic atherosclerosis  Chronic. Stable. Continue current treatment plan as previously prescribed by PCP.    29. Athscl heart disease of native coronary artery w/o ang pctrs  Chronic. Stable. Continue current treatment plan as previously prescribed by PCP.    30. Coronary artery calcification  Chronic. Stable. Continue current treatment plan as previously prescribed by PCP.    31. Coronary  artery disease of native artery of native heart with stable angina pectoris  Chronic. Stable. Continue current treatment plan as previously prescribed by PCP.    32. Essential hypertension  Chronic. Stable. Controlled. Encouraged to increase exercise as tolerated (moderate-intensity aerobic activity and muscle-strengthening activities) improve diet to heart healthy, low sodium diet.  Continue current treatment plan as previously prescribed by PCP.    33. Familial hypercholesterolemia  Chronic. Stable. Continue current treatment plan as previously prescribed by PCP.    34. Acute respiratory failure with hypoxia and hypercapnia  Chronic. Stable. Continue current treatment plan as previously prescribed by PCP.    35. CAFL (chronic airflow limitation)  Chronic. Stable. Continue current treatment plan as previously prescribed by PCP.    36. Hemoptysis  Chronic. Stable. Continue current treatment plan as previously prescribed by PCP.    37. Mucopurulent chronic bronchitis  Chronic. Stable. Continue current treatment plan as previously prescribed by PCP.    38. Generalized anxiety disorder  Chronic. Stable. Continue current treatment plan as previously prescribed by PCP.    39. Depression, unspecified depression type  Chronic. Stable. Continue current treatment plan as previously prescribed by PCP.    40. Recurrent major depressive disorder, in remission  Chronic. Stable. Continue current treatment plan as previously prescribed by PCP.    41. Chronic pain syndrome  Chronic. Stable. Continue current treatment plan as previously prescribed by PCP.    42. Diabetic polyneuropathy associated with type 2 diabetes mellitus  Chronic. Stable. Continue current treatment plan as previously prescribed by PCP.    43. Hemiplegia and hemiparesis following cerebral infarction affecting left non-dominant side  Chronic. Stable. Continue current treatment plan as previously prescribed by PCP.    44. Neuropathic pain  Chronic. Stable.  Continue current treatment plan as previously prescribed by PCP.    45. Radiculopathy of thoracolumbar region  Chronic. Stable. Continue current treatment plan as previously prescribed by PCP.    46. Encounter for Medicare annual wellness exam  Chronic. Stable. Continue current treatment plan as previously prescribed by PCP.  - Ambulatory Referral/Consult to Enhanced Annual Wellness Visit (eAWV)    47. Senile purpura   Chronic. Stable. Continue current treatment plan as previously prescribed by PCP.\    Provided Indira with a 5-10 year written screening schedule and personal prevention plan. Recommendations were developed using the USPSTF age appropriate recommendations. Education, counseling, and referrals were provided as needed.  After Visit Summary printed and given to patient which includes a list of additional screenings\tests needed.    No follow-ups on file.      Jax Gibbs NP

## 2024-07-10 NOTE — PATIENT INSTRUCTIONS
Counseling and Referral of Other Preventative  (Italic type indicates deductible and co-insurance are waived)    Patient Name: Indira Waters  Today's Date: 7/10/2024    Health Maintenance       Date Due Completion Date    COVID-19 Vaccine (1) Never done ---    TETANUS VACCINE Never done ---    RSV Vaccine (Age 60+ and Pregnant patients) (1 - 1-dose 60+ series) Never done ---    Shingles Vaccine (1 of 2) 05/23/2016 3/28/2016    Pneumococcal Vaccines (Age 65+) (3 of 3 - PCV) 10/19/2018 10/19/2017    Eye Exam 01/10/2024 1/10/2023    Influenza Vaccine (1) 09/01/2024 11/5/2019    Hemoglobin A1c 09/05/2024 6/5/2024    Diabetes Urine Screening 09/15/2024 9/15/2023    Lipid Panel 06/05/2025 6/5/2024    DEXA Scan 07/14/2025 7/14/2022        No orders of the defined types were placed in this encounter.    The following information is provided to all patients.  This information is to help you find resources for any of the problems found today that may be affecting your health:                  Living healthy guide: www.[facility].[place].gov      Understanding Diabetes: www.diabetes.org      Eating healthy: www.cdc.gov/healthyweight      CDC home safety checklist: www.cdc.gov/steadi/patient.html      Agency on Aging: www.goea.[place].[facility]      Alcoholics anonymous (AA): www.aa.org      Physical Activity: www.salma.nih.gov/sq2jskf      Tobacco use: www.quitwithusla.org

## 2024-07-11 ENCOUNTER — TELEPHONE (OUTPATIENT)
Dept: ENDOCRINOLOGY | Facility: CLINIC | Age: 79
End: 2024-07-11
Payer: MEDICARE

## 2024-07-11 NOTE — TELEPHONE ENCOUNTER
----- Message from Susie Hernandez PharmD sent at 7/10/2024 12:30 PM CDT -----  Regarding: Refill Request  Oma,    Ms. Waters's daughter states she has been trying to call for a refill for Ms. Waters's lancets without success. I'm not sure if she may be a part of the [employer] service disruption that has been going on. I informed her I would reach out. They request this refill to be sent to the [facility] [address]. Please reach out to pt when this is completed. Thanks in advance.     Susie Hernandez PharmD  Clinical Pharmacist  [facility]   Phone: [phone]  Fax: [phone]

## 2024-07-12 ENCOUNTER — TELEPHONE (OUTPATIENT)
Dept: INTERNAL MEDICINE | Facility: CLINIC | Age: 79
End: 2024-07-12
Payer: MEDICARE

## 2024-07-12 DIAGNOSIS — I50.43 ACUTE ON CHRONIC COMBINED SYSTOLIC AND DIASTOLIC CONGESTIVE HEART FAILURE: Primary | ICD-10-CM

## 2024-07-12 RX ORDER — TORSEMIDE 20 MG/1
20 TABLET ORAL DAILY
Qty: 90 TABLET | Refills: 3 | Status: SHIPPED | OUTPATIENT
Start: 2024-07-12 | End: 2025-07-12

## 2024-07-12 NOTE — TELEPHONE ENCOUNTER
----- Message from Nithya Martínez sent at 7/12/2024 11:59 AM CDT -----  Regarding: call back  Name of caller: Osvaldo ( Home health nurse)       What is the requesting detail: requesting a call back to discuss the pt blood sugar and medication.Please give him a call back to further discuss.       Can the clinic reply by [facility]:       What number to call back: [phone]

## 2024-07-12 NOTE — TELEPHONE ENCOUNTER
Left voice mail for Osvaldo to call the office back to inform him that Dr. Zapata said patient should take Torsemide

## 2024-07-12 NOTE — TELEPHONE ENCOUNTER
Returned call to Osvaldo  for [facility]. :( Home health nurse)   He has several concerns regarding Ms. Waters who has been discharged from [facility]  Blood sugar level for last three days        Breakfast  350-393        Lunch        220-230        Dinner       116-104  2. Medication list has Lantus and Lispro listed but she is taking      Humalog 11 units before meals      Levimir   24 U in AM                    20 U at night  3. She has Furosemide 40 mg on DC paper and she has Demedex ( Torsemide) 40 mg at home (Not sure which to take)        He has noticed 1+ edema to feet today as compared to Tuesday         He has a O2 sat of 93-95 % compared to Tuesday of 97%

## 2024-07-16 DIAGNOSIS — E11.65 TYPE 2 DIABETES MELLITUS WITH HYPERGLYCEMIA, WITH LONG-TERM CURRENT USE OF INSULIN: Chronic | ICD-10-CM

## 2024-07-16 DIAGNOSIS — Z79.4 TYPE 2 DIABETES MELLITUS WITH HYPERGLYCEMIA, WITH LONG-TERM CURRENT USE OF INSULIN: Chronic | ICD-10-CM

## 2024-07-16 RX ORDER — INSULIN ASPART 100 [IU]/ML
INJECTION, SOLUTION INTRAVENOUS; SUBCUTANEOUS
Qty: 30 ML | Refills: 3 | Status: SHIPPED | OUTPATIENT
Start: 2024-07-16 | End: 2024-07-19

## 2024-07-16 RX ORDER — INSULIN DETEMIR 100 [IU]/ML
INJECTION, SOLUTION SUBCUTANEOUS
Qty: 30 ML | Refills: 3 | Status: SHIPPED | OUTPATIENT
Start: 2024-07-16

## 2024-07-17 ENCOUNTER — OUTPATIENT CASE MANAGEMENT (OUTPATIENT)
Dept: ADMINISTRATIVE | Facility: OTHER | Age: 79
End: 2024-07-17
Payer: MEDICARE

## 2024-07-17 NOTE — LETTER
Indira Waters  [address]    [place] [address]      Dear Indira Waters,     I work with [facility] Outpatient Care Management Department. We received a referral to call you to discuss your medical history. These services are free of charge and are offered to [facility] patients who have recently been discharged from any of our facilities or who have medical conditions that may require the skill of a nurse to assist with management.     I am a Registered Nurse who specializes in connecting patients with available medical and financial resources as well as addressing any educational needs that may be indicated.    I spoke to both you and Esha today by telephone, but Esha was not comfortable answering questions. If you or Esha decide that you would like to participate in the program regarding your health, you can reach me at either number below.    The Outpatient Care Management Department can be reached at [phone], from 8:00AM to 4:30 PM, on Monday thru Friday.     Additionally, [facility] also has a program where a nurse is available 24/7 to answer questions or provide medical advice, their number is [phone].      Thanks,    Paola Malhotra RN  Outpatient Care Management  Phone #: [phone]

## 2024-07-17 NOTE — PROGRESS NOTES
Outpatient Care Management  Patient Does Not Consent    Patient: Indira Waters  MRN:  [id number]  Date of Service:  7/17/2024  Completed by:  Madina Malhotra RN    Chief Complaint   Patient presents with    Case Closure       Patient Summary           Consent Received:  Decline  Decline Reason:  Other (see comment)  Not comfortable answering questions due to family situation.

## 2024-07-18 ENCOUNTER — TELEPHONE (OUTPATIENT)
Dept: INTERNAL MEDICINE | Facility: CLINIC | Age: 79
End: 2024-07-18
Payer: MEDICARE

## 2024-07-18 ENCOUNTER — HOSPITAL ENCOUNTER (INPATIENT)
Facility: OTHER | Age: 79
LOS: 1 days | Discharge: HOME | DRG: 191 | End: 2024-07-20
Attending: EMERGENCY MEDICINE | Admitting: INTERNAL MEDICINE
Payer: MEDICARE

## 2024-07-18 DIAGNOSIS — R07.9 CHEST PAIN: Primary | ICD-10-CM

## 2024-07-18 DIAGNOSIS — E11.65 TYPE 2 DIABETES MELLITUS WITH HYPERGLYCEMIA, WITH LONG-TERM CURRENT USE OF INSULIN: Chronic | ICD-10-CM

## 2024-07-18 DIAGNOSIS — I50.43 ACUTE ON CHRONIC COMBINED SYSTOLIC AND DIASTOLIC CONGESTIVE HEART FAILURE: ICD-10-CM

## 2024-07-18 DIAGNOSIS — J41.0 SIMPLE CHRONIC BRONCHITIS: ICD-10-CM

## 2024-07-18 DIAGNOSIS — I25.10 CAD (CORONARY ARTERY DISEASE): ICD-10-CM

## 2024-07-18 DIAGNOSIS — I69.354 HEMIPLEGIA AND HEMIPARESIS FOLLOWING CEREBRAL INFARCTION AFFECTING LEFT NON-DOMINANT SIDE: Primary | ICD-10-CM

## 2024-07-18 DIAGNOSIS — Z79.4 TYPE 2 DIABETES MELLITUS WITH HYPERGLYCEMIA, WITH LONG-TERM CURRENT USE OF INSULIN: Chronic | ICD-10-CM

## 2024-07-18 DIAGNOSIS — J44.9 CHRONIC OBSTRUCTIVE PULMONARY DISEASE, UNSPECIFIED COPD TYPE: ICD-10-CM

## 2024-07-18 LAB
ALBUMIN SERPL BCP-MCNC: 2.3 G/DL (ref 3.5–5.2)
ALP SERPL-CCNC: 61 U/L (ref 55–135)
ALT SERPL W/O P-5'-P-CCNC: 10 U/L (ref 10–44)
ANION GAP SERPL CALC-SCNC: 8 MMOL/L (ref 8–16)
AST SERPL-CCNC: 13 U/L (ref 10–40)
BASOPHILS # BLD AUTO: 0.04 K/UL (ref 0–0.2)
BASOPHILS NFR BLD: 0.4 % (ref 0–1.9)
BILIRUB SERPL-MCNC: 0.1 MG/DL (ref 0.1–1)
BILIRUB UR QL STRIP: NEGATIVE
BNP SERPL-MCNC: 211 PG/ML (ref 0–99)
BUN SERPL-MCNC: 14 MG/DL (ref 8–23)
CALCIUM SERPL-MCNC: 6.9 MG/DL (ref 8.7–10.5)
CHLORIDE SERPL-SCNC: 117 MMOL/L (ref 95–110)
CLARITY UR: CLEAR
CO2 SERPL-SCNC: 18 MMOL/L (ref 23–29)
COLOR UR: YELLOW
CREAT SERPL-MCNC: 0.7 MG/DL (ref 0.5–1.4)
DIFFERENTIAL METHOD BLD: ABNORMAL
EOSINOPHIL # BLD AUTO: 0.6 K/UL (ref 0–0.5)
EOSINOPHIL NFR BLD: 5.5 % (ref 0–8)
ERYTHROCYTE [DISTWIDTH] IN BLOOD BY AUTOMATED COUNT: 17.5 % (ref 11.5–14.5)
EST. GFR  (NO RACE VARIABLE): >60 ML/MIN/1.73 M^2
GLUCOSE SERPL-MCNC: 167 MG/DL (ref 70–110)
GLUCOSE UR QL STRIP: NEGATIVE
HCT VFR BLD AUTO: 32 % (ref 37–48.5)
HGB BLD-MCNC: 10.2 G/DL (ref 12–16)
HGB UR QL STRIP: NEGATIVE
IMM GRANULOCYTES # BLD AUTO: 0.05 K/UL (ref 0–0.04)
IMM GRANULOCYTES NFR BLD AUTO: 0.4 % (ref 0–0.5)
KETONES UR QL STRIP: NEGATIVE
LEUKOCYTE ESTERASE UR QL STRIP: ABNORMAL
LIPASE SERPL-CCNC: 31 U/L (ref 4–60)
LYMPHOCYTES # BLD AUTO: 2.9 K/UL (ref 1–4.8)
LYMPHOCYTES NFR BLD: 25.8 % (ref 18–48)
MAGNESIUM SERPL-MCNC: 1.8 MG/DL (ref 1.6–2.6)
MCH RBC QN AUTO: 28.6 PG (ref 27–31)
MCHC RBC AUTO-ENTMCNC: 31.9 G/DL (ref 32–36)
MCV RBC AUTO: 90 FL (ref 82–98)
MICROSCOPIC COMMENT: ABNORMAL
MONOCYTES # BLD AUTO: 0.8 K/UL (ref 0.3–1)
MONOCYTES NFR BLD: 7.1 % (ref 4–15)
NEUTROPHILS # BLD AUTO: 6.9 K/UL (ref 1.8–7.7)
NEUTROPHILS NFR BLD: 60.8 % (ref 38–73)
NITRITE UR QL STRIP: NEGATIVE
NRBC BLD-RTO: 0 /100 WBC
PH UR STRIP: 5 [PH] (ref 5–8)
PHOSPHATE SERPL-MCNC: 4.9 MG/DL (ref 2.7–4.5)
PLATELET # BLD AUTO: 249 K/UL (ref 150–450)
PMV BLD AUTO: 10.8 FL (ref 9.2–12.9)
POCT GLUCOSE: 198 MG/DL (ref 70–110)
POCT GLUCOSE: 208 MG/DL (ref 70–110)
POTASSIUM SERPL-SCNC: 3.2 MMOL/L (ref 3.5–5.1)
PROT SERPL-MCNC: 4.8 G/DL (ref 6–8.4)
PROT UR QL STRIP: NEGATIVE
RBC # BLD AUTO: 3.57 M/UL (ref 4–5.4)
RBC #/AREA URNS HPF: 1 /HPF (ref 0–4)
SODIUM SERPL-SCNC: 143 MMOL/L (ref 136–145)
SP GR UR STRIP: 1.01 (ref 1–1.03)
SQUAMOUS #/AREA URNS HPF: 1 /HPF
TROPONIN I SERPL DL<=0.01 NG/ML-MCNC: <0.006 NG/ML (ref 0–0.03)
TROPONIN I SERPL DL<=0.01 NG/ML-MCNC: <0.006 NG/ML (ref 0–0.03)
URN SPEC COLLECT METH UR: ABNORMAL
UROBILINOGEN UR STRIP-ACNC: NEGATIVE EU/DL
WBC # BLD AUTO: 11.33 K/UL (ref 3.9–12.7)
WBC #/AREA URNS HPF: 6 /HPF (ref 0–5)

## 2024-07-18 PROCEDURE — 80053 COMPREHEN METABOLIC PANEL: CPT | Performed by: EMERGENCY MEDICINE

## 2024-07-18 PROCEDURE — 83880 ASSAY OF NATRIURETIC PEPTIDE: CPT | Performed by: EMERGENCY MEDICINE

## 2024-07-18 PROCEDURE — 83690 ASSAY OF LIPASE: CPT | Performed by: EMERGENCY MEDICINE

## 2024-07-18 PROCEDURE — 93005 ELECTROCARDIOGRAM TRACING: CPT

## 2024-07-18 PROCEDURE — 36410 VNPNXR 3YR/> PHY/QHP DX/THER: CPT

## 2024-07-18 PROCEDURE — 96372 THER/PROPH/DIAG INJ SC/IM: CPT

## 2024-07-18 PROCEDURE — 84100 ASSAY OF PHOSPHORUS: CPT | Performed by: EMERGENCY MEDICINE

## 2024-07-18 PROCEDURE — 36415 COLL VENOUS BLD VENIPUNCTURE: CPT

## 2024-07-18 PROCEDURE — 99285 EMERGENCY DEPT VISIT HI MDM: CPT | Mod: 25

## 2024-07-18 PROCEDURE — 93010 ELECTROCARDIOGRAM REPORT: CPT | Mod: 76,,, | Performed by: INTERNAL MEDICINE

## 2024-07-18 PROCEDURE — 83735 ASSAY OF MAGNESIUM: CPT | Performed by: EMERGENCY MEDICINE

## 2024-07-18 PROCEDURE — 85025 COMPLETE CBC W/AUTO DIFF WBC: CPT | Performed by: EMERGENCY MEDICINE

## 2024-07-18 PROCEDURE — 25000003 PHARM REV CODE 250

## 2024-07-18 PROCEDURE — A4216 STERILE WATER/SALINE, 10 ML: HCPCS

## 2024-07-18 PROCEDURE — G0378 HOSPITAL OBSERVATION PER HR: HCPCS

## 2024-07-18 PROCEDURE — 25000003 PHARM REV CODE 250: Performed by: EMERGENCY MEDICINE

## 2024-07-18 PROCEDURE — 63600175 PHARM REV CODE 636 W HCPCS

## 2024-07-18 PROCEDURE — 93010 ELECTROCARDIOGRAM REPORT: CPT | Mod: ,,, | Performed by: INTERNAL MEDICINE

## 2024-07-18 PROCEDURE — 84484 ASSAY OF TROPONIN QUANT: CPT | Mod: 91

## 2024-07-18 PROCEDURE — 84484 ASSAY OF TROPONIN QUANT: CPT | Performed by: EMERGENCY MEDICINE

## 2024-07-18 PROCEDURE — 81000 URINALYSIS NONAUTO W/SCOPE: CPT | Performed by: EMERGENCY MEDICINE

## 2024-07-18 PROCEDURE — C1751 CATH, INF, PER/CENT/MIDLINE: HCPCS

## 2024-07-18 PROCEDURE — 63600175 PHARM REV CODE 636 W HCPCS: Performed by: EMERGENCY MEDICINE

## 2024-07-18 RX ORDER — POTASSIUM CHLORIDE 20 MEQ/1
40 TABLET, EXTENDED RELEASE ORAL ONCE
Status: COMPLETED | OUTPATIENT
Start: 2024-07-18 | End: 2024-07-18

## 2024-07-18 RX ORDER — METHOCARBAMOL 500 MG/1
500 TABLET, FILM COATED ORAL 4 TIMES DAILY
Status: DISCONTINUED | OUTPATIENT
Start: 2024-07-18 | End: 2024-07-20 | Stop reason: HOSPADM

## 2024-07-18 RX ORDER — POTASSIUM CHLORIDE 7.45 MG/ML
10 INJECTION INTRAVENOUS ONCE
Status: COMPLETED | OUTPATIENT
Start: 2024-07-18 | End: 2024-07-18

## 2024-07-18 RX ORDER — SODIUM CHLORIDE 0.9 % (FLUSH) 0.9 %
10 SYRINGE (ML) INJECTION
Status: DISCONTINUED | OUTPATIENT
Start: 2024-07-18 | End: 2024-07-20 | Stop reason: HOSPADM

## 2024-07-18 RX ORDER — INSULIN ASPART 100 [IU]/ML
0-5 INJECTION, SOLUTION INTRAVENOUS; SUBCUTANEOUS
Status: DISCONTINUED | OUTPATIENT
Start: 2024-07-18 | End: 2024-07-19

## 2024-07-18 RX ORDER — PREGABALIN 75 MG/1
75 CAPSULE ORAL 3 TIMES DAILY
Status: DISCONTINUED | OUTPATIENT
Start: 2024-07-18 | End: 2024-07-20 | Stop reason: HOSPADM

## 2024-07-18 RX ORDER — METHOCARBAMOL 500 MG/1
500 TABLET, FILM COATED ORAL 4 TIMES DAILY
COMMUNITY

## 2024-07-18 RX ORDER — SODIUM CHLORIDE 0.9 % (FLUSH) 0.9 %
10 SYRINGE (ML) INJECTION EVERY 6 HOURS
Status: DISCONTINUED | OUTPATIENT
Start: 2024-07-19 | End: 2024-07-20 | Stop reason: HOSPADM

## 2024-07-18 RX ORDER — ALLOPURINOL 300 MG/1
300 TABLET ORAL DAILY
Status: DISCONTINUED | OUTPATIENT
Start: 2024-07-19 | End: 2024-07-20 | Stop reason: HOSPADM

## 2024-07-18 RX ORDER — IBUPROFEN 200 MG
24 TABLET ORAL
Status: DISCONTINUED | OUTPATIENT
Start: 2024-07-18 | End: 2024-07-19

## 2024-07-18 RX ORDER — GLUCAGON 1 MG
1 KIT INJECTION
Status: DISCONTINUED | OUTPATIENT
Start: 2024-07-18 | End: 2024-07-19

## 2024-07-18 RX ORDER — ACETAMINOPHEN 325 MG/1
650 TABLET ORAL EVERY 8 HOURS PRN
Status: DISCONTINUED | OUTPATIENT
Start: 2024-07-18 | End: 2024-07-19

## 2024-07-18 RX ORDER — CALCIUM CARBONATE 200(500)MG
1000 TABLET,CHEWABLE ORAL
Status: COMPLETED | OUTPATIENT
Start: 2024-07-18 | End: 2024-07-18

## 2024-07-18 RX ORDER — NITROGLYCERIN 0.4 MG/1
0.4 TABLET SUBLINGUAL EVERY 5 MIN PRN
Status: DISCONTINUED | OUTPATIENT
Start: 2024-07-19 | End: 2024-07-20 | Stop reason: HOSPADM

## 2024-07-18 RX ORDER — SERTRALINE HYDROCHLORIDE 100 MG/1
100 TABLET, FILM COATED ORAL DAILY PRN
Status: DISCONTINUED | OUTPATIENT
Start: 2024-07-18 | End: 2024-07-19

## 2024-07-18 RX ORDER — ONDANSETRON HYDROCHLORIDE 2 MG/ML
4 INJECTION, SOLUTION INTRAVENOUS EVERY 8 HOURS PRN
Status: DISCONTINUED | OUTPATIENT
Start: 2024-07-18 | End: 2024-07-19

## 2024-07-18 RX ORDER — METOPROLOL SUCCINATE 50 MG/1
100 TABLET, EXTENDED RELEASE ORAL DAILY
Status: DISCONTINUED | OUTPATIENT
Start: 2024-07-19 | End: 2024-07-20 | Stop reason: HOSPADM

## 2024-07-18 RX ORDER — NIFEDIPINE 30 MG/1
30 TABLET, EXTENDED RELEASE ORAL DAILY
Status: DISCONTINUED | OUTPATIENT
Start: 2024-07-19 | End: 2024-07-19

## 2024-07-18 RX ORDER — IBUPROFEN 200 MG
16 TABLET ORAL
Status: DISCONTINUED | OUTPATIENT
Start: 2024-07-18 | End: 2024-07-19

## 2024-07-18 RX ORDER — HYDROCODONE BITARTRATE AND ACETAMINOPHEN 10; 325 MG/1; MG/1
1 TABLET ORAL EVERY 6 HOURS PRN
Status: DISCONTINUED | OUTPATIENT
Start: 2024-07-18 | End: 2024-07-19

## 2024-07-18 RX ORDER — HYDROCODONE BITARTRATE AND ACETAMINOPHEN 10; 325 MG/1; MG/1
1 TABLET ORAL EVERY 6 HOURS PRN
COMMUNITY
Start: 2024-07-08

## 2024-07-18 RX ORDER — TALC
6 POWDER (GRAM) TOPICAL NIGHTLY PRN
Status: DISCONTINUED | OUTPATIENT
Start: 2024-07-18 | End: 2024-07-20 | Stop reason: HOSPADM

## 2024-07-18 RX ORDER — NAPROXEN SODIUM 220 MG/1
81 TABLET, FILM COATED ORAL DAILY
Status: COMPLETED | OUTPATIENT
Start: 2024-07-19 | End: 2024-07-19

## 2024-07-18 RX ORDER — LOSARTAN POTASSIUM 50 MG/1
50 TABLET ORAL DAILY
Status: DISCONTINUED | OUTPATIENT
Start: 2024-07-19 | End: 2024-07-20 | Stop reason: HOSPADM

## 2024-07-18 RX ADMIN — PREGABALIN 75 MG: 75 CAPSULE ORAL at 09:07

## 2024-07-18 RX ADMIN — HYDROCODONE BITARTRATE AND ACETAMINOPHEN 1 TABLET: 10; 325 TABLET ORAL at 10:07

## 2024-07-18 RX ADMIN — ONDANSETRON 4 MG: 2 INJECTION INTRAMUSCULAR; INTRAVENOUS at 08:07

## 2024-07-18 RX ADMIN — POTASSIUM CHLORIDE 10 MEQ: 7.46 INJECTION, SOLUTION INTRAVENOUS at 08:07

## 2024-07-18 RX ADMIN — POTASSIUM CHLORIDE 40 MEQ: 1500 TABLET, EXTENDED RELEASE ORAL at 07:07

## 2024-07-18 RX ADMIN — METHOCARBAMOL 500 MG: 500 TABLET ORAL at 09:07

## 2024-07-18 RX ADMIN — Medication 10 ML: at 10:07

## 2024-07-18 RX ADMIN — INSULIN ASPART 1 UNITS: 100 INJECTION, SOLUTION INTRAVENOUS; SUBCUTANEOUS at 10:07

## 2024-07-18 RX ADMIN — CALCIUM CARBONATE (ANTACID) CHEW TAB 500 MG 1000 MG: 500 CHEW TAB at 07:07

## 2024-07-18 NOTE — ED PROVIDER NOTES
Encounter Date: 2024       History     Chief Complaint   Patient presents with    Chest Pain     Reports chest pain onset today after a stressful event. Pt reports welfare checks and getting her phone tapped by her daughters. Ems states the pt is not paranoid and aaox4. 2 doses of nitro PTA, pain now 7/10 compared to 10/10. Denies sob     HPI    79-year-old female with past medical history of CAD, hyperlipidemia, hypertension, lupus, fibromyalgia, diabetes, history of CVA with possible residual left-sided weakness presenting with chest pain for about 1 hour prior to arrival.  Patient reports she took a nap and has been undergoing a lot of stress with recent family who does not live here calling welfare checks on her.  She reports they did yesterday and she did not have any chest pain but today she had chest pain associated with it.  She reports taking a nitro at home and getting in additional to nitro with EMS with resolution in her symptoms.  She reports a little bit of nausea as well, she denies any shortness of breath, abdominal pain, leg swelling, fevers or chills, or any additional associated complaints.    Review of patient's allergies indicates:   Allergen Reactions    Bleach (sodium hypochlorite) Shortness Of Breath    Nitrofurantoin macrocrystalline Anaphylaxis    Lipitor [atorvastatin] Diarrhea and Rash    Nsaids (non-steroidal anti-inflammatory drug)      Tolerates aspirin      Pcn [penicillins]     Statins-hmg-coa reductase inhibitors     Toradol [ketorolac]      Past Medical History:   Diagnosis Date    Arthritis     Back pain     Cancer     ovarian    Cervical cancer     Coronary artery disease     Depression     Diabetes mellitus     Fibromyalgia     Heart attack     History of MI (myocardial infarction)     Hyperlipidemia     Hypertension     Lupus     Stroke     slight left sided weakness     Past Surgical History:   Procedure Laterality Date    APPENDECTOMY       SECTION      2     CORONARY ANGIOGRAPHY N/A 05/06/2022    Procedure: ANGIOGRAM, CORONARY ARTERY;  Surgeon: Jose L Méndez MD;  Location: [facility] CATH LAB;  Service: Cardiology;  Laterality: N/A;    HYSTERECTOMY      with USO for cervical cancer    INJECTION OF ANESTHETIC AGENT AROUND NERVE Bilateral 05/05/2021    Procedure: BLOCK, NERVE, SYMPATHIC;  Surgeon: Holden Pereira MD;  Location: [facility] PAIN MGT;  Service: Pain Management;  Laterality: Bilateral;    INJECTION OF ANESTHETIC AGENT AROUND NERVE N/A 08/25/2021    Procedure: BLOCK, NERVE, SYMPATHETIC  need consent;  Surgeon: Holden Pereira MD;  Location: [facility] PAIN MGT;  Service: Pain Management;  Laterality: N/A;    KS EVAL,SWALLOW FUNCTION,CINE/VIDEO RECORD  09/09/2021         TONSILLECTOMY      TRIAL OF SPINAL CORD NERVE STIMULATOR N/A 3/8/2023    Procedure: LUMBAR SPINAL CORD STIMULATOR TRIAL NEVRO REP PATIENT STATES SHE NO LONGER TAKES PLAVIX;  Surgeon: Holden Pereira MD;  Location: [facility] PAIN MGT;  Service: Pain Management;  Laterality: N/A;     Family History   Problem Relation Name Age of Onset    COPD Mother      Lupus Mother      Hernia Mother      Uterine cancer Mother          vs cervical cancer    Ovarian cancer Mother      Diabetes Father      Coronary artery disease Father      Colon cancer Maternal Grandmother          in her 50's     Social History     Tobacco Use    Smoking status: Former     Current packs/day: 0.00     Types: Cigarettes     Quit date: 11/2020     Years since quitting: 3.7     Passive exposure: Past    Smokeless tobacco: Never   Substance Use Topics    Alcohol use: Not Currently     Comment: occasionally    Drug use: Yes     Types: Hydrocodone     Comment: three times a day     Review of Systems   Constitutional: Negative.    HENT: Negative.     Eyes: Negative.    Respiratory: Negative.     Cardiovascular:  Positive for chest pain.   Gastrointestinal: Negative.    Genitourinary: Negative.    Musculoskeletal: Negative.    Skin: Negative.     Neurological: Negative.        Physical Exam     Initial Vitals [07/18/24 1737]   BP Pulse Resp Temp SpO2   137/64 80 17 -- (!) 92 %      MAP       --         Physical Exam    Nursing note and vitals reviewed.  Constitutional: She appears well-developed and well-nourished. She is not diaphoretic. No distress.   HENT:   Head: Normocephalic and atraumatic.   Nose: Nose normal.   Eyes: EOM are normal. Pupils are equal, round, and reactive to light.   Neck: Neck supple. No JVD present.   Normal range of motion.  Cardiovascular:  Normal rate, regular rhythm, normal heart sounds and intact distal pulses.           Pulmonary/Chest: Breath sounds normal. No stridor. No respiratory distress. She has no wheezes. She has no rhonchi. She has no rales. She exhibits no tenderness.   Abdominal: Abdomen is soft. Bowel sounds are normal. She exhibits no distension. There is no abdominal tenderness.   Musculoskeletal:         General: No tenderness or edema. Normal range of motion.      Cervical back: Normal range of motion and neck supple.     Neurological: She is alert and oriented to person, place, and time. She has normal strength.   Skin: Skin is warm and dry. Capillary refill takes less than 2 seconds. No rash noted. No erythema.         ED Course   Procedures  Labs Reviewed   CBC W/ AUTO DIFFERENTIAL - Abnormal; Notable for the following components:       Result Value    RBC 3.57 (*)     Hemoglobin 10.2 (*)     Hematocrit 32.0 (*)     MCHC 31.9 (*)     RDW 17.5 (*)     Immature Grans (Abs) 0.05 (*)     Eos # 0.6 (*)     All other components within normal limits   COMPREHENSIVE METABOLIC PANEL - Abnormal; Notable for the following components:    Potassium 3.2 (*)     Chloride 117 (*)     CO2 18 (*)     Glucose 167 (*)     Calcium 6.9 (*)     Total Protein 4.8 (*)     Albumin 2.3 (*)     All other components within normal limits    Narrative:      CALCIUM  critical result(s) called and verbal readback obtained from   TOM  CAMDEN STEWART. by TL8 07/18/2024 18:50   B-TYPE NATRIURETIC PEPTIDE - Abnormal; Notable for the following components:     (*)     All other components within normal limits   TROPONIN I   LIPASE   TROPONIN I   URINALYSIS, REFLEX TO URINE CULTURE   MAGNESIUM   PHOSPHORUS   TROPONIN I          Imaging Results              X-Ray Chest AP Portable (Final result)  Result time 07/18/24 18:11:00      Final result by Duane Mims MD (07/18/24 18:11:00)                   Impression:      No acute abnormality.      Electronically signed by: Duane Mims  Date:    07/18/2024  Time:    18:11               Narrative:    EXAMINATION:  XR CHEST AP PORTABLE    CLINICAL HISTORY:  Chest Pain;    TECHNIQUE:  Single frontal view of the chest was performed.    COMPARISON:  06/23/2024    FINDINGS:  The lungs are clear, with normal appearance of pulmonary vasculature and no pleural effusion or pneumothorax.    The cardiac silhouette is normal in size. The hilar and mediastinal contours are unremarkable.    Bones are intact.                                       Medications   sodium chloride 0.9% flush 10 mL (has no administration in time range)     And   sodium chloride 0.9% flush 10 mL (has no administration in time range)   potassium chloride 10 mEq in 100 mL IVPB (has no administration in time range)   sodium chloride 0.9% flush 10 mL (has no administration in time range)   melatonin tablet 6 mg (has no administration in time range)   acetaminophen tablet 650 mg (has no administration in time range)   ondansetron injection 4 mg (has no administration in time range)   potassium chloride SA CR tablet 40 mEq (40 mEq Oral Given 7/18/24 1935)   calcium carbonate 200 mg calcium (500 mg) chewable tablet 1,000 mg (1,000 mg Oral Given 7/18/24 1933)     Medical Decision Making  Amount and/or Complexity of Data Reviewed  Labs: ordered.  Radiology: ordered.    Risk  OTC drugs.  Prescription drug  management.      MDM:      79-year-old female with past medical history of CAD, hyperlipidemia, hypertension, lupus, fibromyalgia, diabetes, history of CVA with possible residual left-sided weakness presenting with chest pain for about 1 hour prior to arrival.  Differential Diagnosis includes:  Acute mi/ACS, PE, pneumothorax, aortic dissection, anxiety, electrolyte abnormality.  Physical exam as noted above.  ED workup notable for EKG showing sinus rhythm rate of 79 beats per minute, occasional PVCs, nonspecific ST and T-wave changes noted inferiorly and laterally,  MS, no STEMI.  CBC white blood cell count 11.33, hemoglobin 10.2, CMP with potassium 3.2, calcium 6.9 (corrected 8.3), albumin 2.3, troponin negative, , lipase 31, chest x-ray unremarkable.  Patient presentation appears consistent with chest pain, unclear etiology likely precipitated by stress, negative initial troponin however patient does have a significant history of CAD with angiogram 2022 showing two-vessel disease.  She received aspirin and nitro and chest pain was responsive to the nitro prior to arrival.  Will place in observation for continued evaluation.  She appears well upon reassessment with no further chest pain. Discussed diagnosis and further treatment with patient and family at bedside. All questions answered, patient transferred to floor improved and stable.      Note was created using voice recognition software. Note may have occasional typographical or grammatical errors, garbled syntax, and other bizarre constructions that may not have been identified and edited despite good whit initial review prior to signing.                                       Clinical Impression:  Final diagnoses:  [R07.9] Chest pain (Primary)          ED Disposition Condition    Observation Stable                Willy Ross MD  07/18/24 2002

## 2024-07-18 NOTE — TELEPHONE ENCOUNTER
Patients daughter has been contacted in regards to patient referral for home health care. Patient informed requested forms have been faxed using number [phone]. Patient daughter also states patient is currently having chest pains and they are currently calling ambulance. Informed message will be routed to MD for further advise and comments. Understanding voiced.

## 2024-07-18 NOTE — TELEPHONE ENCOUNTER
----- Message from Vlad Weller sent at 7/18/2024 11:41 AM CDT -----  Name of Who is Calling:CAYLA GOODEN [[id number]]         What is the request in detail:PT needs an order for  consult please assist            Can the clinic reply by [facility]: No    At home health care  [phone]     FAX# [phone]         What Number to Call Back if not in [facility]: [phone]

## 2024-07-18 NOTE — Clinical Note
Diagnosis: Chest pain [900052]   Future Attending Provider: HEAVENLY GARCIA [[id number]]   Reason for IP Medical Treatment  (Clinical interventions that can only be accomplished in the IP setting? ) :: hypoxia   I certify that Inpatient services for greater than or equal to 2 midnights are medically necessary:: Yes   Plans for Post-Acute care--if anticipated (pick the single best option):: C. Discharge home with home health services

## 2024-07-19 ENCOUNTER — TELEPHONE (OUTPATIENT)
Dept: ENDOCRINOLOGY | Facility: CLINIC | Age: 79
End: 2024-07-19
Payer: MEDICARE

## 2024-07-19 DIAGNOSIS — E11.65 TYPE 2 DIABETES MELLITUS WITH HYPERGLYCEMIA, WITH LONG-TERM CURRENT USE OF INSULIN: Primary | ICD-10-CM

## 2024-07-19 DIAGNOSIS — Z79.4 TYPE 2 DIABETES MELLITUS WITH HYPERGLYCEMIA, WITH LONG-TERM CURRENT USE OF INSULIN: Primary | ICD-10-CM

## 2024-07-19 PROBLEM — Z86.73 HISTORY OF CVA (CEREBROVASCULAR ACCIDENT): Status: ACTIVE | Noted: 2024-07-19

## 2024-07-19 PROBLEM — J96.21 ACUTE ON CHRONIC RESPIRATORY FAILURE WITH HYPOXIA: Status: ACTIVE | Noted: 2024-07-19

## 2024-07-19 PROBLEM — J42 CHRONIC BRONCHITIS: Status: ACTIVE | Noted: 2024-07-19

## 2024-07-19 PROBLEM — J44.9 COPD (CHRONIC OBSTRUCTIVE PULMONARY DISEASE): Status: ACTIVE | Noted: 2024-07-19

## 2024-07-19 LAB
ALBUMIN SERPL BCP-MCNC: 3 G/DL (ref 3.5–5.2)
ALP SERPL-CCNC: 78 U/L (ref 55–135)
ALT SERPL W/O P-5'-P-CCNC: 13 U/L (ref 10–44)
ANION GAP SERPL CALC-SCNC: 7 MMOL/L (ref 8–16)
AST SERPL-CCNC: 14 U/L (ref 10–40)
BASOPHILS # BLD AUTO: 0.04 K/UL (ref 0–0.2)
BASOPHILS NFR BLD: 0.4 % (ref 0–1.9)
BILIRUB SERPL-MCNC: 0.3 MG/DL (ref 0.1–1)
BUN SERPL-MCNC: 16 MG/DL (ref 8–23)
CALCIUM SERPL-MCNC: 9.6 MG/DL (ref 8.7–10.5)
CHLORIDE SERPL-SCNC: 106 MMOL/L (ref 95–110)
CO2 SERPL-SCNC: 24 MMOL/L (ref 23–29)
CREAT SERPL-MCNC: 1.1 MG/DL (ref 0.5–1.4)
DIFFERENTIAL METHOD BLD: ABNORMAL
EOSINOPHIL # BLD AUTO: 0.6 K/UL (ref 0–0.5)
EOSINOPHIL NFR BLD: 6.2 % (ref 0–8)
ERYTHROCYTE [DISTWIDTH] IN BLOOD BY AUTOMATED COUNT: 17.4 % (ref 11.5–14.5)
EST. GFR  (NO RACE VARIABLE): 51 ML/MIN/1.73 M^2
GLUCOSE SERPL-MCNC: 369 MG/DL (ref 70–110)
HCT VFR BLD AUTO: 31.1 % (ref 37–48.5)
HGB BLD-MCNC: 9.9 G/DL (ref 12–16)
IMM GRANULOCYTES # BLD AUTO: 0.03 K/UL (ref 0–0.04)
IMM GRANULOCYTES NFR BLD AUTO: 0.3 % (ref 0–0.5)
LYMPHOCYTES # BLD AUTO: 2.8 K/UL (ref 1–4.8)
LYMPHOCYTES NFR BLD: 30.1 % (ref 18–48)
MCH RBC QN AUTO: 28.5 PG (ref 27–31)
MCHC RBC AUTO-ENTMCNC: 31.8 G/DL (ref 32–36)
MCV RBC AUTO: 90 FL (ref 82–98)
MONOCYTES # BLD AUTO: 0.7 K/UL (ref 0.3–1)
MONOCYTES NFR BLD: 7.6 % (ref 4–15)
NEUTROPHILS # BLD AUTO: 5.1 K/UL (ref 1.8–7.7)
NEUTROPHILS NFR BLD: 55.4 % (ref 38–73)
NRBC BLD-RTO: 0 /100 WBC
OHS QRS DURATION: 78 MS
OHS QRS DURATION: 80 MS
OHS QTC CALCULATION: 431 MS
OHS QTC CALCULATION: 444 MS
PLATELET # BLD AUTO: 230 K/UL (ref 150–450)
PMV BLD AUTO: 11.2 FL (ref 9.2–12.9)
POCT GLUCOSE: 390 MG/DL (ref 70–110)
POCT GLUCOSE: 453 MG/DL (ref 70–110)
POCT GLUCOSE: 468 MG/DL (ref 70–110)
POCT GLUCOSE: 473 MG/DL (ref 70–110)
POCT GLUCOSE: 479 MG/DL (ref 70–110)
POTASSIUM SERPL-SCNC: 5.3 MMOL/L (ref 3.5–5.1)
POTASSIUM SERPL-SCNC: 5.3 MMOL/L (ref 3.5–5.1)
PROT SERPL-MCNC: 6.2 G/DL (ref 6–8.4)
RBC # BLD AUTO: 3.47 M/UL (ref 4–5.4)
SODIUM SERPL-SCNC: 137 MMOL/L (ref 136–145)
TROPONIN I SERPL DL<=0.01 NG/ML-MCNC: 0.01 NG/ML (ref 0–0.03)
TROPONIN I SERPL DL<=0.01 NG/ML-MCNC: 0.01 NG/ML (ref 0–0.03)
WBC # BLD AUTO: 9.13 K/UL (ref 3.9–12.7)

## 2024-07-19 PROCEDURE — 36415 COLL VENOUS BLD VENIPUNCTURE: CPT

## 2024-07-19 PROCEDURE — 84132 ASSAY OF SERUM POTASSIUM: CPT | Performed by: NURSE PRACTITIONER

## 2024-07-19 PROCEDURE — 25000242 PHARM REV CODE 250 ALT 637 W/ HCPCS: Performed by: NURSE PRACTITIONER

## 2024-07-19 PROCEDURE — 36415 COLL VENOUS BLD VENIPUNCTURE: CPT | Mod: XB | Performed by: NURSE PRACTITIONER

## 2024-07-19 PROCEDURE — 63600175 PHARM REV CODE 636 W HCPCS: Performed by: NURSE PRACTITIONER

## 2024-07-19 PROCEDURE — A4216 STERILE WATER/SALINE, 10 ML: HCPCS | Performed by: EMERGENCY MEDICINE

## 2024-07-19 PROCEDURE — 25000003 PHARM REV CODE 250

## 2024-07-19 PROCEDURE — 94640 AIRWAY INHALATION TREATMENT: CPT

## 2024-07-19 PROCEDURE — 27000221 HC OXYGEN, UP TO 24 HOURS

## 2024-07-19 PROCEDURE — 25000003 PHARM REV CODE 250: Performed by: EMERGENCY MEDICINE

## 2024-07-19 PROCEDURE — 96372 THER/PROPH/DIAG INJ SC/IM: CPT | Performed by: NURSE PRACTITIONER

## 2024-07-19 PROCEDURE — 99233 SBSQ HOSP IP/OBS HIGH 50: CPT | Mod: ,,, | Performed by: INTERNAL MEDICINE

## 2024-07-19 PROCEDURE — 94761 N-INVAS EAR/PLS OXIMETRY MLT: CPT

## 2024-07-19 PROCEDURE — 25000003 PHARM REV CODE 250: Performed by: NURSE PRACTITIONER

## 2024-07-19 PROCEDURE — 96372 THER/PROPH/DIAG INJ SC/IM: CPT | Performed by: PHYSICIAN ASSISTANT

## 2024-07-19 PROCEDURE — 94618 PULMONARY STRESS TESTING: CPT

## 2024-07-19 PROCEDURE — 63600175 PHARM REV CODE 636 W HCPCS: Performed by: PHYSICIAN ASSISTANT

## 2024-07-19 PROCEDURE — 80053 COMPREHEN METABOLIC PANEL: CPT

## 2024-07-19 PROCEDURE — 99900035 HC TECH TIME PER 15 MIN (STAT)

## 2024-07-19 PROCEDURE — 11000001 HC ACUTE MED/SURG PRIVATE ROOM

## 2024-07-19 PROCEDURE — 84484 ASSAY OF TROPONIN QUANT: CPT

## 2024-07-19 PROCEDURE — 85025 COMPLETE CBC W/AUTO DIFF WBC: CPT

## 2024-07-19 RX ORDER — ONDANSETRON 8 MG/1
8 TABLET, ORALLY DISINTEGRATING ORAL EVERY 8 HOURS PRN
Status: DISCONTINUED | OUTPATIENT
Start: 2024-07-19 | End: 2024-07-20 | Stop reason: HOSPADM

## 2024-07-19 RX ORDER — IBUPROFEN 200 MG
16 TABLET ORAL
Status: DISCONTINUED | OUTPATIENT
Start: 2024-07-19 | End: 2024-07-20 | Stop reason: HOSPADM

## 2024-07-19 RX ORDER — ALLOPURINOL 300 MG/1
300 TABLET ORAL DAILY
Status: DISCONTINUED | OUTPATIENT
Start: 2024-07-20 | End: 2024-07-19

## 2024-07-19 RX ORDER — IPRATROPIUM BROMIDE AND ALBUTEROL SULFATE 2.5; .5 MG/3ML; MG/3ML
3 SOLUTION RESPIRATORY (INHALATION) EVERY 4 HOURS
Status: DISCONTINUED | OUTPATIENT
Start: 2024-07-19 | End: 2024-07-20 | Stop reason: HOSPADM

## 2024-07-19 RX ORDER — LOSARTAN POTASSIUM 50 MG/1
50 TABLET ORAL DAILY
Status: DISCONTINUED | OUTPATIENT
Start: 2024-07-20 | End: 2024-07-19

## 2024-07-19 RX ORDER — INSULIN GLARGINE 100 [IU]/ML
20 INJECTION, SOLUTION SUBCUTANEOUS DAILY
Status: DISCONTINUED | OUTPATIENT
Start: 2024-07-20 | End: 2024-07-20 | Stop reason: HOSPADM

## 2024-07-19 RX ORDER — NIFEDIPINE 30 MG/1
30 TABLET, EXTENDED RELEASE ORAL 2 TIMES DAILY
Status: DISCONTINUED | OUTPATIENT
Start: 2024-07-19 | End: 2024-07-20 | Stop reason: HOSPADM

## 2024-07-19 RX ORDER — METOPROLOL SUCCINATE 50 MG/1
100 TABLET, EXTENDED RELEASE ORAL DAILY
Status: DISCONTINUED | OUTPATIENT
Start: 2024-07-20 | End: 2024-07-19

## 2024-07-19 RX ORDER — CETIRIZINE HYDROCHLORIDE 5 MG/1
10 TABLET ORAL
Status: COMPLETED | OUTPATIENT
Start: 2024-07-19 | End: 2024-07-19

## 2024-07-19 RX ORDER — POLYETHYLENE GLYCOL 3350 17 G/17G
17 POWDER, FOR SOLUTION ORAL 2 TIMES DAILY PRN
Status: DISCONTINUED | OUTPATIENT
Start: 2024-07-19 | End: 2024-07-20 | Stop reason: HOSPADM

## 2024-07-19 RX ORDER — ALBUTEROL SULFATE 1.25 MG/3ML
1.25 SOLUTION RESPIRATORY (INHALATION) EVERY 6 HOURS PRN
Qty: 75 ML | Refills: 0 | Status: SHIPPED | OUTPATIENT
Start: 2024-07-19 | End: 2024-07-20

## 2024-07-19 RX ORDER — PREGABALIN 75 MG/1
75 CAPSULE ORAL 3 TIMES DAILY
Status: DISCONTINUED | OUTPATIENT
Start: 2024-07-19 | End: 2024-07-19

## 2024-07-19 RX ORDER — IBUPROFEN 200 MG
24 TABLET ORAL
Status: DISCONTINUED | OUTPATIENT
Start: 2024-07-19 | End: 2024-07-20 | Stop reason: HOSPADM

## 2024-07-19 RX ORDER — HYDROCODONE BITARTRATE AND ACETAMINOPHEN 5; 325 MG/1; MG/1
1 TABLET ORAL EVERY 4 HOURS PRN
Status: DISCONTINUED | OUTPATIENT
Start: 2024-07-19 | End: 2024-07-20 | Stop reason: HOSPADM

## 2024-07-19 RX ORDER — INSULIN ASPART 100 [IU]/ML
5 INJECTION, SOLUTION INTRAVENOUS; SUBCUTANEOUS
Status: DISCONTINUED | OUTPATIENT
Start: 2024-07-20 | End: 2024-07-20

## 2024-07-19 RX ORDER — SERTRALINE HYDROCHLORIDE 100 MG/1
100 TABLET, FILM COATED ORAL DAILY
Status: DISCONTINUED | OUTPATIENT
Start: 2024-07-20 | End: 2024-07-20 | Stop reason: HOSPADM

## 2024-07-19 RX ORDER — ACETAMINOPHEN 325 MG/1
650 TABLET ORAL EVERY 4 HOURS PRN
Status: DISCONTINUED | OUTPATIENT
Start: 2024-07-19 | End: 2024-07-20 | Stop reason: HOSPADM

## 2024-07-19 RX ORDER — ONDANSETRON HYDROCHLORIDE 2 MG/ML
4 INJECTION, SOLUTION INTRAVENOUS EVERY 8 HOURS PRN
Status: DISCONTINUED | OUTPATIENT
Start: 2024-07-19 | End: 2024-07-20 | Stop reason: HOSPADM

## 2024-07-19 RX ORDER — IPRATROPIUM BROMIDE AND ALBUTEROL SULFATE 2.5; .5 MG/3ML; MG/3ML
3 SOLUTION RESPIRATORY (INHALATION) EVERY 4 HOURS PRN
Status: DISCONTINUED | OUTPATIENT
Start: 2024-07-19 | End: 2024-07-20 | Stop reason: HOSPADM

## 2024-07-19 RX ORDER — MORPHINE SULFATE 2 MG/ML
2 INJECTION, SOLUTION INTRAMUSCULAR; INTRAVENOUS EVERY 4 HOURS PRN
Status: DISCONTINUED | OUTPATIENT
Start: 2024-07-19 | End: 2024-07-20 | Stop reason: HOSPADM

## 2024-07-19 RX ORDER — SODIUM CHLORIDE 0.9 % (FLUSH) 0.9 %
10 SYRINGE (ML) INJECTION
Status: DISCONTINUED | OUTPATIENT
Start: 2024-07-19 | End: 2024-07-19

## 2024-07-19 RX ORDER — ASPIRIN 81 MG/1
81 TABLET ORAL DAILY
Status: DISCONTINUED | OUTPATIENT
Start: 2024-07-20 | End: 2024-07-20 | Stop reason: HOSPADM

## 2024-07-19 RX ORDER — FUROSEMIDE 10 MG/ML
40 INJECTION INTRAMUSCULAR; INTRAVENOUS
Status: COMPLETED | OUTPATIENT
Start: 2024-07-19 | End: 2024-07-19

## 2024-07-19 RX ORDER — INSULIN GLARGINE 100 [IU]/ML
20 INJECTION, SOLUTION SUBCUTANEOUS NIGHTLY
Status: DISCONTINUED | OUTPATIENT
Start: 2024-07-19 | End: 2024-07-19

## 2024-07-19 RX ORDER — TORSEMIDE 20 MG/1
20 TABLET ORAL DAILY
Status: DISCONTINUED | OUTPATIENT
Start: 2024-07-20 | End: 2024-07-20 | Stop reason: HOSPADM

## 2024-07-19 RX ORDER — INSULIN GLARGINE 100 [IU]/ML
18 INJECTION, SOLUTION SUBCUTANEOUS NIGHTLY
Status: DISCONTINUED | OUTPATIENT
Start: 2024-07-19 | End: 2024-07-20 | Stop reason: HOSPADM

## 2024-07-19 RX ORDER — TALC
6 POWDER (GRAM) TOPICAL NIGHTLY PRN
Status: DISCONTINUED | OUTPATIENT
Start: 2024-07-19 | End: 2024-07-19

## 2024-07-19 RX ORDER — INSULIN ASPART 100 [IU]/ML
0-5 INJECTION, SOLUTION INTRAVENOUS; SUBCUTANEOUS
Status: DISCONTINUED | OUTPATIENT
Start: 2024-07-19 | End: 2024-07-20

## 2024-07-19 RX ORDER — INSULIN ASPART 100 [IU]/ML
0-5 INJECTION, SOLUTION INTRAVENOUS; SUBCUTANEOUS
Status: DISCONTINUED | OUTPATIENT
Start: 2024-07-19 | End: 2024-07-19

## 2024-07-19 RX ORDER — GLUCAGON 1 MG
1 KIT INJECTION
Status: DISCONTINUED | OUTPATIENT
Start: 2024-07-19 | End: 2024-07-20 | Stop reason: HOSPADM

## 2024-07-19 RX ORDER — INSULIN LISPRO 100 [IU]/ML
10 INJECTION, SOLUTION INTRAVENOUS; SUBCUTANEOUS
Qty: 27 ML | Refills: 3 | Status: SHIPPED | OUTPATIENT
Start: 2024-07-19 | End: 2025-07-19

## 2024-07-19 RX ORDER — ENOXAPARIN SODIUM 100 MG/ML
40 INJECTION SUBCUTANEOUS EVERY 24 HOURS
Status: DISCONTINUED | OUTPATIENT
Start: 2024-07-19 | End: 2024-07-20 | Stop reason: HOSPADM

## 2024-07-19 RX ORDER — AMOXICILLIN 250 MG
1 CAPSULE ORAL 2 TIMES DAILY
Status: DISCONTINUED | OUTPATIENT
Start: 2024-07-19 | End: 2024-07-20 | Stop reason: HOSPADM

## 2024-07-19 RX ADMIN — LOSARTAN POTASSIUM 50 MG: 50 TABLET, FILM COATED ORAL at 08:07

## 2024-07-19 RX ADMIN — Medication 10 ML: at 05:07

## 2024-07-19 RX ADMIN — IPRATROPIUM BROMIDE AND ALBUTEROL SULFATE 3 ML: 2.5; .5 SOLUTION RESPIRATORY (INHALATION) at 11:07

## 2024-07-19 RX ADMIN — HYDROCODONE BITARTRATE AND ACETAMINOPHEN 1 TABLET: 10; 325 TABLET ORAL at 08:07

## 2024-07-19 RX ADMIN — IPRATROPIUM BROMIDE AND ALBUTEROL SULFATE 3 ML: 2.5; .5 SOLUTION RESPIRATORY (INHALATION) at 05:07

## 2024-07-19 RX ADMIN — INSULIN ASPART 5 UNITS: 100 INJECTION, SOLUTION INTRAVENOUS; SUBCUTANEOUS at 04:07

## 2024-07-19 RX ADMIN — PREGABALIN 75 MG: 75 CAPSULE ORAL at 08:07

## 2024-07-19 RX ADMIN — IPRATROPIUM BROMIDE AND ALBUTEROL SULFATE 3 ML: 2.5; .5 SOLUTION RESPIRATORY (INHALATION) at 02:07

## 2024-07-19 RX ADMIN — PREGABALIN 75 MG: 75 CAPSULE ORAL at 02:07

## 2024-07-19 RX ADMIN — IPRATROPIUM BROMIDE AND ALBUTEROL SULFATE 3 ML: 2.5; .5 SOLUTION RESPIRATORY (INHALATION) at 07:07

## 2024-07-19 RX ADMIN — INSULIN ASPART 3 UNITS: 100 INJECTION, SOLUTION INTRAVENOUS; SUBCUTANEOUS at 08:07

## 2024-07-19 RX ADMIN — CETIRIZINE HYDROCHLORIDE 10 MG: 5 TABLET, FILM COATED ORAL at 05:07

## 2024-07-19 RX ADMIN — NIFEDIPINE 30 MG: 30 TABLET, FILM COATED, EXTENDED RELEASE ORAL at 08:07

## 2024-07-19 RX ADMIN — INSULIN ASPART 5 UNITS: 100 INJECTION, SOLUTION INTRAVENOUS; SUBCUTANEOUS at 12:07

## 2024-07-19 RX ADMIN — METHOCARBAMOL 500 MG: 500 TABLET ORAL at 08:07

## 2024-07-19 RX ADMIN — ENOXAPARIN SODIUM 40 MG: 40 INJECTION SUBCUTANEOUS at 08:07

## 2024-07-19 RX ADMIN — Medication 10 ML: at 06:07

## 2024-07-19 RX ADMIN — INSULIN ASPART 5 UNITS: 100 INJECTION, SOLUTION INTRAVENOUS; SUBCUTANEOUS at 08:07

## 2024-07-19 RX ADMIN — ASPIRIN 81 MG CHEWABLE TABLET 81 MG: 81 TABLET CHEWABLE at 08:07

## 2024-07-19 RX ADMIN — FUROSEMIDE 40 MG: 10 INJECTION, SOLUTION INTRAMUSCULAR; INTRAVENOUS at 01:07

## 2024-07-19 RX ADMIN — METOPROLOL SUCCINATE 100 MG: 50 TABLET, EXTENDED RELEASE ORAL at 08:07

## 2024-07-19 RX ADMIN — INSULIN GLARGINE 20 UNITS: 100 INJECTION, SOLUTION SUBCUTANEOUS at 12:07

## 2024-07-19 RX ADMIN — ALLOPURINOL 300 MG: 300 TABLET ORAL at 08:07

## 2024-07-19 RX ADMIN — METHOCARBAMOL 500 MG: 500 TABLET ORAL at 12:07

## 2024-07-19 RX ADMIN — METHOCARBAMOL 500 MG: 500 TABLET ORAL at 04:07

## 2024-07-19 RX ADMIN — Medication 10 ML: at 12:07

## 2024-07-19 RX ADMIN — INSULIN GLARGINE 18 UNITS: 100 INJECTION, SOLUTION SUBCUTANEOUS at 08:07

## 2024-07-19 RX ADMIN — HYDROCODONE BITARTRATE AND ACETAMINOPHEN 1 TABLET: 5; 325 TABLET ORAL at 08:07

## 2024-07-19 RX ADMIN — INSULIN ASPART 5 UNITS: 100 INJECTION, SOLUTION INTRAVENOUS; SUBCUTANEOUS at 09:07

## 2024-07-19 RX ADMIN — DOCUSATE SODIUM AND SENNOSIDES 1 TABLET: 8.6; 5 TABLET, FILM COATED ORAL at 08:07

## 2024-07-19 RX ADMIN — HYDROCODONE BITARTRATE AND ACETAMINOPHEN 1 TABLET: 10; 325 TABLET ORAL at 04:07

## 2024-07-19 NOTE — PLAN OF CARE
MOON Message    Medicare Outpatient and Observation Notification regarding financial responsibilityExplained to patient/caregiver; Signed/date by patient/caregiverDate CRABTREE was signed7/19/2024Time CRABTREE was [id number]   Ambulatory

## 2024-07-19 NOTE — ED NOTES
Pt arrives to unit via wheelchair, with PCT Ramon. Personal belongings and family at bedside. [place] to room, nightly routines and procedures. Resp even and unlabored, NAD Noted. Informed pt of continuation of care. Instructed pt to use call light for all questions and concerns. Pt verbalizes understanding, no complaints at this time.

## 2024-07-19 NOTE — ED NOTES
Report received from CARLOS Elizabeth. Care assumed at this time. Patient denies any complaints at this time. Patient remains attached to cardiac monitoring with heart rate and rhythm maintained within normal limits. Bed is in the lowest position with wheels locked. Call light and personal items placed within reach and patient instructed to call nurses' station for any concerns. Family remains at bedside. Family and patient updated on plan of care at this time. Will continue to monitor while in EDOU.

## 2024-07-19 NOTE — H&P
ED Observation Unit  History and Physical      I assumed care of this patient from the Main ED at onset of observation time, 8:00 PM on 07/18/2024.       History of Present Illness:  79-year-old female with past medical history of CAD, hyperlipidemia, hypertension, lupus, fibromyalgia, diabetes, history of CVA with possible residual left-sided weakness presenting with chest pain for about 1 hour prior to arrival. Patient reports she took a nap and has been undergoing a lot of stress with recent family who does not live here calling welfare checks on her. She reports they did yesterday and she did not have any chest pain but today she had chest pain associated with it. She reports taking a nitro at home and getting in additional to nitro with EMS with resolution in her symptoms. She reports a little bit of nausea as well, she denies any shortness of breath, abdominal pain, leg swelling, fevers or chills, or any additional associated complaints.     I reviewed the ED Provider Note dated 7/18/24 prior to my evaluation of this patient.  I reviewed all labs and imaging performed in the Main ED, prior to patient being placed in Observation. Patient was placed in the ED Observation Unit for chest pain rule out and cardiology consult.    ED Course:  On review of labs, no leukocytosis.  Stable anemia at her baseline.  Sodium within normal limits.  Mild hypokalemia of 3.2.  Replenished with oral potassium.  Serum glucose elevated at 167.  Hypocalcemia at 6.9.  Replenished with oral calcium carbonate.  Total protein and albumin decreased at 4.8 and 2.3.  Lipase within normal limits.  BNP mildly elevated at 211.  Initial troponin is negative.  Magnesium within normal limits.  Mild hyperphosphatemia at 4.9.  UA is pending.  No acute process on chest x-ray. EKG showing sinus rhythm rate of 79 beats per minute, occasional PVCs, nonspecific ST and T-wave changes noted inferiorly and laterally,  MS, no STEMI.  On my  assessment, patient reports some pressure to the left side of her chest and nausea. Denies any sharp or stabbing pain. She denies fever, cough, shortness for breath, palpitations, chest tightness, back pain, or leg swelling. Plan for serial troponins and cardiology consult in the morning.    PMHx   Past Medical History:   Diagnosis Date    Arthritis     Back pain     Cancer     ovarian    Cervical cancer     Coronary artery disease     Depression     Diabetes mellitus     Fibromyalgia     Heart attack     History of MI (myocardial infarction)     Hyperlipidemia     Hypertension     Lupus     Stroke     slight left sided weakness      Past Surgical History:   Procedure Laterality Date    APPENDECTOMY       SECTION      2    CORONARY ANGIOGRAPHY N/A 2022    Procedure: ANGIOGRAM, CORONARY ARTERY;  Surgeon: Jose L Méndez MD;  Location: [facility] CATH LAB;  Service: Cardiology;  Laterality: N/A;    HYSTERECTOMY      with USO for cervical cancer    INJECTION OF ANESTHETIC AGENT AROUND NERVE Bilateral 2021    Procedure: BLOCK, NERVE, SYMPATHIC;  Surgeon: Holden Pereira MD;  Location: [facility] PAIN MGT;  Service: Pain Management;  Laterality: Bilateral;    INJECTION OF ANESTHETIC AGENT AROUND NERVE N/A 2021    Procedure: BLOCK, NERVE, SYMPATHETIC  need consent;  Surgeon: Holden Pereira MD;  Location: [facility] PAIN MGT;  Service: Pain Management;  Laterality: N/A;    YARED LINK,SWALLOW FUNCTION,CINE/VIDEO RECORD  2021         TONSILLECTOMY      TRIAL OF SPINAL CORD NERVE STIMULATOR N/A 3/8/2023    Procedure: LUMBAR SPINAL CORD STIMULATOR TRIAL NEVRO REP PATIENT STATES SHE NO LONGER TAKES PLAVIX;  Surgeon: Holden Pereira MD;  Location: [facility] PAIN MGT;  Service: Pain Management;  Laterality: N/A;        Family Hx   Family History   Problem Relation Name Age of Onset    COPD Mother      Lupus Mother      Hernia Mother      Uterine cancer Mother          vs cervical cancer    Ovarian cancer Mother       Diabetes Father      Coronary artery disease Father      Colon cancer Maternal Grandmother          in her 50's        Social Hx   Social History     Socioeconomic History    Marital status:    Tobacco Use    Smoking status: Former     Current packs/day: 0.00     Types: Cigarettes     Quit date: 11/2020     Years since quitting: 3.7     Passive exposure: Past    Smokeless tobacco: Never   Substance and Sexual Activity    Alcohol use: Not Currently     Comment: occasionally    Drug use: Yes     Types: Hydrocodone     Comment: three times a day    Sexual activity: Not Currently   Social History Narrative    Lives with daughter. .      Social Determinants of Health     Financial Resource Strain: Low Risk  (7/10/2024)    Overall Financial Resource Strain (CARDIA)     Difficulty of Paying Living Expenses: Not hard at all   Food Insecurity: No Food Insecurity (7/10/2024)    Hunger Vital Sign     Worried About Running Out of Food in the Last Year: Never true     Ran Out of Food in the Last Year: Never true   Transportation Needs: No Transportation Needs (7/10/2024)    PRAPARE - Transportation     Lack of Transportation (Medical): No     Lack of Transportation (Non-Medical): No   Physical Activity: Sufficiently Active (7/10/2024)    Exercise Vital Sign     Days of Exercise per Week: 7 days     Minutes of Exercise per Session: 40 min   Recent Concern: Physical Activity - Inactive (6/21/2024)    Exercise Vital Sign     Days of Exercise per Week: 0 days     Minutes of Exercise per Session: 0 min   Stress: No Stress Concern Present (7/10/2024)    [employer] - Occupational Stress Questionnaire     Feeling of Stress : Not at all   Housing Stability: Low Risk  (7/10/2024)    Housing Stability Vital Sign     Unable to Pay for Housing in the Last Year: No     Homeless in the Last Year: No        Vital Signs   Vitals:    07/18/24 1737 07/18/24 1755   BP: 137/64    Pulse: 80    Resp: 17     SpO2: (!) 92% 97%        Review of Systems  ROS  See HPI.   Physical Exam  Physical Exam  Vitals reviewed.   Constitutional:       General: She is not in acute distress.     Appearance: Normal appearance. She is not ill-appearing, toxic-appearing or diaphoretic.   HENT:      Head: Normocephalic and atraumatic.      Right Ear: External ear normal.      Left Ear: External ear normal.      Nose: Nose normal.      Mouth/Throat:      Mouth: Mucous membranes are moist.      Pharynx: Oropharynx is clear.   Eyes:      Extraocular Movements: Extraocular movements intact.      Conjunctiva/sclera: Conjunctivae normal.   Cardiovascular:      Rate and Rhythm: Normal rate and regular rhythm.      Pulses:           Radial pulses are 2+ on the right side and 2+ on the left side.        Dorsalis pedis pulses are 2+ on the right side and 2+ on the left side.        Posterior tibial pulses are 2+ on the right side and 2+ on the left side.      Heart sounds: Normal heart sounds.   Pulmonary:      Effort: Pulmonary effort is normal. No tachypnea, accessory muscle usage or respiratory distress.      Breath sounds: Normal breath sounds. No stridor. No decreased breath sounds, wheezing, rhonchi or rales.      Comments: Speaking in full sentences.  Chest:      Chest wall: No tenderness.   Abdominal:      General: Bowel sounds are normal. There is no distension.      Palpations: Abdomen is soft. There is no mass.      Tenderness: There is no abdominal tenderness. There is no right CVA tenderness, left CVA tenderness, guarding or rebound.      Hernia: No hernia is present.   Musculoskeletal:         General: No swelling. Normal range of motion.      Right lower leg: No edema.      Left lower leg: No edema.   Skin:     General: Skin is warm and dry.   Neurological:      General: No focal deficit present.      Mental Status: She is alert and oriented to person, place, and time.   Psychiatric:         Mood and Affect: Mood normal.          Behavior: Behavior normal.         Thought Content: Thought content normal.         Judgment: Judgment normal.         Medications:   Scheduled Meds:   [START ON 7/19/2024] sodium chloride 0.9%  10 mL Intravenous Q6H     Continuous Infusions:  PRN Meds:.  Current Facility-Administered Medications:     acetaminophen, 650 mg, Oral, Q8H PRN    melatonin, 6 mg, Oral, Nightly PRN    ondansetron, 4 mg, Intravenous, Q8H PRN    Flushing PICC/Midline Protocol, , , Until Discontinued **AND** [START ON 7/19/2024] sodium chloride 0.9%, 10 mL, Intravenous, Q6H **AND** sodium chloride 0.9%, 10 mL, Intravenous, PRN    sodium chloride 0.9%, 10 mL, Intravenous, PRN      Assessment/Plan:  1. Chest pain    2. Hypertension  3. Diabetes  4 CAD  5. HLD  6. Lupus    Serial troponin, cardiology consult in AM, nitro PRN  Continue home medications  Accucheck before each meal and at bedtime, SSI  Continue home medications  Continue home medication  Continue home medication    Case was discussed with the ED provider, Dr. Ross

## 2024-07-19 NOTE — PROCEDURES
"Instructions for measuring Oxygen Saturation  to qualify for Home Oxygen:    Please obtain and document (REPLACE # [email or web address] AND COPY AND PASTE TEXT INSIDE QUOTATION MARKS) the following for Home Oxygen:    This must be performed and documented within 2 days of discharge.    "Pulse Oximetry:    95% SpO2 on room air at rest on 07/19/2024"                                 If 88% or less, STOP and document.     If 89% or more, measure and  document the following:    "Pulse Oximetry:   95%SpO2 on room air at rest on 07/19/2024                           86%SpO2  on room air with activity/exercise on 07/19/2024                      95% SpO2 on 2L oxgyen with activity/excercise on 07/19/2024"    (NOTE:  FOR OXYGEN WITH ACTIVITY - MEDICARE WANTS TO SEE THAT THE OXYGEN INCREASES ONCE A PATIENT HAS WALKED AND IS BACK ON THE OXYGEN)        "

## 2024-07-19 NOTE — CONSULTS
[facility]    Date of admission:  7/18/2024     Reason for Consult:    Chest pain    HPI:    This 79-year-old female with a known history of coronary artery disease developed chest pain yesterday for which she came to the emergency department.  Started at rest.  Left precordial discomfort.  Took nitroglycerin home without relief so she came to the emergency department.  Overall the discomfort lasted several hours.  Feels better today.  Denies associated dyspnea but states it hurts to take a deep breath.  No associated nausea, vomiting, or diaphoresis.    Medications  Current Facility-Administered Medications   Medication Dose Route Frequency Provider Last Rate Last Admin    acetaminophen tablet 650 mg  650 mg Oral Q8H PRN Med Fields PA-C        allopurinoL tablet 300 mg  300 mg Oral Daily Med Fields PA-C   300 mg at 07/19/24 0859    dextrose 10% bolus 125 mL 125 mL  12.5 g Intravenous PRN Med Fields PA-C        dextrose 10% bolus 250 mL 250 mL  25 g Intravenous PRN Med Fields PA-C        glucagon (human recombinant) injection 1 mg  1 mg Intramuscular PRN Med Fields PA-C        glucose chewable tablet 16 g  16 g Oral PRN Med Fields PA-C        glucose chewable tablet 24 g  24 g Oral PRN Med Fields PA-C        HYDROcodone-acetaminophen  mg per tablet 1 tablet  1 tablet Oral Q6H PRN Med Fields PA-C   1 tablet at 07/19/24 0818    insulin aspart U-100 pen 0-5 Units  0-5 Units Subcutaneous QID (AC + HS) PRN Med Fields PA-C   5 Units at 07/19/24 0819    insulin aspart U-100 pen 0-5 Units  0-5 Units Subcutaneous QID (AC + HS) PRN Marybel Garcia PA   5 Units at 07/19/24 0929    losartan tablet 50 mg  50 mg Oral Daily Med Fields PA-C   50 mg at 07/19/24 0859    melatonin tablet 6 mg  6 mg Oral Nightly PRN Med Fields PA-C        methocarbamoL tablet 500 mg  500 mg Oral QID Med Fields PA-C   500 mg at 07/19/24 0858    metoprolol succinate (TOPROL-XL) 24 hr tablet  100 mg  100 mg Oral Daily Med Fields PA-C   100 mg at 07/19/24 0858    NIFEdipine 24 hr tablet 30 mg  30 mg Oral Daily Med Fields PA-C   30 mg at 07/19/24 0858    nitroGLYCERIN SL tablet 0.4 mg  0.4 mg Sublingual Q5 Min PRN Med Fields PA-C        ondansetron injection 4 mg  4 mg Intravenous Q8H PRN Med Fields PA-C   4 mg at 07/18/24 2043    pregabalin capsule 75 mg  75 mg Oral TID Med Fields PA-C   75 mg at 07/19/24 0858    sertraline tablet 100 mg  100 mg Oral Daily PRN Med Fields PA-C        sodium chloride 0.9% flush 10 mL  10 mL Intravenous Q6H Willy Ross MD   10 mL at 07/19/24 0611    And    sodium chloride 0.9% flush 10 mL  10 mL Intravenous PRN Willy Ross MD        sodium chloride 0.9% flush 10 mL  10 mL Intravenous PRN Med Fields PA-C   10 mL at 07/18/24 2230     Current Outpatient Medications   Medication Sig Dispense Refill    acetaminophen (TYLENOL) 500 MG tablet Take 2 tablets (1,000 mg total) by mouth every 8 (eight) hours as needed for Pain.  0    allopurinoL (ZYLOPRIM) 300 MG tablet Take 1 tablet (300 mg total) by mouth once daily. 90 tablet 3    aspirin (ECOTRIN) 81 MG EC tablet Take 1 tablet (81 mg total) by mouth once daily. 30 tablet 0    blood sugar diagnostic Strp To check BG 6 times daily, to use with insurance preferred meter 200 each 11    blood-glucose meter kit To check BG 6 times daily, to use with insurance preferred meter 1 each 0    HYDROcodone-acetaminophen (NORCO)  mg per tablet Take 1 tablet by mouth every 6 (six) hours as needed.      lancets (TRUEPLUS LANCETS) 30 gauge Misc Check blood sugar 4 times daily 400 each 2    LEVEMIR FLEXPEN 100 unit/mL (3 mL) InPn pen INJECT 20 UNITS EVERY DAY AND 18 UNITS EVERY EVENING 30 mL 3    losartan (COZAAR) 50 MG tablet Take 1 tablet (50 mg total) by mouth once daily. 90 tablet 3    melatonin (MELATIN) 3 mg tablet Take 2 tablets (6 mg total) by mouth nightly as needed for Insomnia.       methocarbamoL (ROBAXIN) 500 MG Tab Take 500 mg by mouth 4 (four) times daily.      metoclopramide HCl (REGLAN) 5 MG tablet TAKE ONE TABLET BY MOUTH FOUR TIMES DAILY AS NEEDED FOR nausea prevention 30 tablet 3    metoprolol succinate (TOPROL-XL) 100 MG 24 hr tablet Take 1 tablet (100 mg total) by mouth once daily. 90 tablet 3    miconazole nitrate 2% (MICOTIN) 2 % Oint Apply topically 2 (two) times daily.      NIFEdipine (PROCARDIA-XL) 30 MG (OSM) 24 hr tablet Take 1 tablet (30 mg total) by mouth 2 (two) times a day. 180 tablet 3    pregabalin (LYRICA) 75 MG capsule Take 1 capsule (75 mg total) by mouth 3 (three) times daily.      senna-docusate 8.6-50 mg (PERICOLACE) 8.6-50 mg per tablet Take 1 tablet by mouth 2 (two) times daily as needed for Constipation. 60 tablet 0    sertraline (ZOLOFT) 100 MG tablet Take 1 tablet (100 mg total) by mouth once daily. 90 tablet 3    teriparatide 20 mcg/dose (620mcg/2.48mL) PnIj Inject 20 mcg into the skin once daily. Discard remainder after 28 days of use. 2.48 mL 2    evolocumab (REPATHA SURECLICK) 140 mg/mL PnIj Inject 1 mL (140 mg total) into the skin every 14 (fourteen) days. 2 each 11    insulin lispro (HUMALOG KWIKPEN INSULIN) 100 unit/mL pen Inject 10 Units into the skin 3 (three) times daily before meals. 27 mL 3    torsemide (DEMADEX) 20 MG Tab Take 1 tablet (20 mg total) by mouth once daily. 90 tablet 3      Prior to Admission medications    Medication Sig Start Date End Date Taking? Authorizing Provider   acetaminophen (TYLENOL) 500 MG tablet Take 2 tablets (1,000 mg total) by mouth every 8 (eight) hours as needed for Pain. 9/10/21  Yes Prasanna Montes MD   allopurinoL (ZYLOPRIM) 300 MG tablet Take 1 tablet (300 mg total) by mouth once daily. 7/2/24  Yes Chun Zapata MD   aspirin (ECOTRIN) 81 MG EC tablet Take 1 tablet (81 mg total) by mouth once daily. 9/10/21  Yes Prasanna Montes MD   blood sugar diagnostic Strp To check BG 6 times daily, to use with  insurance preferred meter 9/29/23  Yes Giuliana Montero NP   blood-glucose meter kit To check BG 6 times daily, to use with insurance preferred meter 9/29/23  Yes Giuliana Montero NP   HYDROcodone-acetaminophen (NORCO)  mg per tablet Take 1 tablet by mouth every 6 (six) hours as needed. 7/8/24  Yes Provider, Historical   lancets (TRUEPLUS LANCETS) 30 gauge Misc Check blood sugar 4 times daily 7/10/24  Yes Melissa Judd NP   LEVEMIR FLEXPEN 100 unit/mL (3 mL) InPn pen INJECT 20 UNITS EVERY DAY AND 18 UNITS EVERY EVENING 7/16/24  Yes Giuliana Montero NP   losartan (COZAAR) 50 MG tablet Take 1 tablet (50 mg total) by mouth once daily. 2/27/24 2/26/25 Yes Chun Zapata MD   melatonin (MELATIN) 3 mg tablet Take 2 tablets (6 mg total) by mouth nightly as needed for Insomnia. 6/26/24  Yes Prasanna Montes MD   methocarbamoL (ROBAXIN) 500 MG Tab Take 500 mg by mouth 4 (four) times daily.   Yes Provider, Historical   metoclopramide HCl (REGLAN) 5 MG tablet TAKE ONE TABLET BY MOUTH FOUR TIMES DAILY AS NEEDED FOR nausea prevention 6/12/24  Yes Chun Zapata MD   metoprolol succinate (TOPROL-XL) 100 MG 24 hr tablet Take 1 tablet (100 mg total) by mouth once daily. 5/23/24  Yes Yoli Lombardi, ANASTASIIA   miconazole nitrate 2% (MICOTIN) 2 % Oint Apply topically 2 (two) times daily. 6/26/24  Yes Prasanna Montes MD   NIFEdipine (PROCARDIA-XL) 30 MG (OSM) 24 hr tablet Take 1 tablet (30 mg total) by mouth 2 (two) times a day. 2/27/24 2/26/25 Yes Chun Zapata MD   pregabalin (LYRICA) 75 MG capsule Take 1 capsule (75 mg total) by mouth 3 (three) times daily. 6/26/24  Yes Prasanna Montes MD   senna-docusate 8.6-50 mg (PERICOLACE) 8.6-50 mg per tablet Take 1 tablet by mouth 2 (two) times daily as needed for Constipation. 9/10/21  Yes Prasanna Montes MD   sertraline (ZOLOFT) 100 MG tablet Take 1 tablet (100 mg total) by mouth once daily. 8/9/23  Yes Chun Zapata MD   teriparatide 20  mcg/dose (620mcg/2.48mL) PnIj Inject 20 mcg into the skin once daily. Discard remainder after 28 days of use. 24  Yes Giuliana Montero NP   NOVOLOG FLEXPEN U-100 INSULIN 100 unit/mL (3 mL) InPn pen INJECT 14 UNITS UNDER THE SKIN 3 TIMES DAILY WITH MEALS AND MEAL CORRECTION SCALE. MAX 40 UNITS 24 Yes Giuliana Montero NP   evolocumab (REPATHA SURECLICK) 140 mg/mL PnIj Inject 1 mL (140 mg total) into the skin every 14 (fourteen) days. 7/3/24   Jose L Méndez MD   insulin lispro (HUMALOG KWIKPEN INSULIN) 100 unit/mL pen Inject 10 Units into the skin 3 (three) times daily before meals. 24  Giuliana Montero NP   torsemide (DEMADEX) 20 MG Tab Take 1 tablet (20 mg total) by mouth once daily. 24  Chun Zapata MD   blood-glucose transmitter (DEXCOM G6 TRANSMITTER) Nicole 1 each by Misc.(Non-Drug; Combo Route) route continuous prn. 10/29/21 9/29/23  Giuliana Montero NP       History  Past Medical History:   Diagnosis Date    Arthritis     Back pain     Cancer     ovarian    Cervical cancer     Coronary artery disease     Depression     Diabetes mellitus     Fibromyalgia     Heart attack     History of MI (myocardial infarction)     Hyperlipidemia     Hypertension     Lupus     Stroke     slight left sided weakness     Past Surgical History:   Procedure Laterality Date    APPENDECTOMY       SECTION      2    CORONARY ANGIOGRAPHY N/A 2022    Procedure: ANGIOGRAM, CORONARY ARTERY;  Surgeon: Jose L Méndez MD;  Location: [facility] CATH LAB;  Service: Cardiology;  Laterality: N/A;    HYSTERECTOMY      with USO for cervical cancer    INJECTION OF ANESTHETIC AGENT AROUND NERVE Bilateral 2021    Procedure: BLOCK, NERVE, SYMPATHIC;  Surgeon: Holden Pereira MD;  Location: [facility] PAIN MGT;  Service: Pain Management;  Laterality: Bilateral;    INJECTION OF ANESTHETIC AGENT AROUND NERVE N/A 2021    Procedure: BLOCK, NERVE, SYMPATHETIC  need consent;  Surgeon: Holden CAMPOS  MD Randall;  Location: [facility] PAIN MGT;  Service: Pain Management;  Laterality: N/A;    YARED LINK,SWALLOW FUNCTION,CINE/VIDEO RECORD  09/09/2021         TONSILLECTOMY      TRIAL OF SPINAL CORD NERVE STIMULATOR N/A 3/8/2023    Procedure: LUMBAR SPINAL CORD STIMULATOR TRIAL NEVRO REP PATIENT STATES SHE NO LONGER TAKES PLAVIX;  Surgeon: Holden Pereira MD;  [place] [facility] [place] [facility] [place]  Service: Pain Management;  Laterality: N/A;     Social History     Socioeconomic History    Marital status:    Tobacco Use    Smoking status: Former     Current packs/day: 0.00     Types: Cigarettes     Quit date: 11/2020     Years since quitting: 3.7     Passive exposure: Past    Smokeless tobacco: Never   Substance and Sexual Activity    Alcohol use: Not Currently     Comment: occasionally    Drug use: Yes     Types: Hydrocodone     Comment: three times a day    Sexual activity: Not Currently   Social History Narrative    Lives with daughter. .      Social Determinants of Health     Financial Resource Strain: Low Risk  (7/10/2024)    Overall Financial Resource Strain (CARDIA)     Difficulty of Paying Living Expenses: Not hard at all   Food Insecurity: No Food Insecurity (7/10/2024)    Hunger Vital Sign     Worried About Running Out of Food in the Last Year: Never true     Ran Out of Food in the Last Year: Never true   Transportation Needs: No Transportation Needs (7/10/2024)    PRAPARE - Transportation     Lack of Transportation (Medical): No     Lack of Transportation (Non-Medical): No   Physical Activity: Sufficiently Active (7/10/2024)    Exercise Vital Sign     Days of Exercise per Week: 7 days     Minutes of Exercise per Session: 40 min   Recent Concern: Physical Activity - Inactive (6/21/2024)    Exercise Vital Sign     Days of Exercise per Week: 0 days     Minutes of Exercise per Session: 0 min   Stress: No Stress Concern Present (7/10/2024)    [employer] - Occupational Stress  Questionnaire     Feeling of Stress : Not at all   Housing Stability: Low Risk  (7/10/2024)    Housing Stability Vital Sign     Unable to Pay for Housing in the Last Year: No     Homeless in the Last Year: No     Family History   Problem Relation Name Age of Onset    COPD Mother      Lupus Mother      Hernia Mother      Uterine cancer Mother          vs cervical cancer    Ovarian cancer Mother      Diabetes Father      Coronary artery disease Father      Colon cancer Maternal Grandmother          in her 50's        Allergies  Review of patient's allergies indicates:   Allergen Reactions    Bleach (sodium hypochlorite) Shortness Of Breath    Nitrofurantoin macrocrystalline Anaphylaxis    Lipitor [atorvastatin] Diarrhea and Rash    Nsaids (non-steroidal anti-inflammatory drug)      Tolerates aspirin      Pcn [penicillins]     Statins-hmg-coa reductase inhibitors     Toradol [ketorolac]        Review of Systems   Review of Systems   Constitutional: Negative for chills, fever and malaise/fatigue.   HENT:  Negative for nosebleeds.    Eyes:  Negative for blurred vision, double vision, vision loss in left eye and vision loss in right eye.   Cardiovascular:  Positive for chest pain. Negative for claudication, dyspnea on exertion, irregular heartbeat, leg swelling, near-syncope, orthopnea, palpitations, paroxysmal nocturnal dyspnea and syncope.   Respiratory:  Negative for cough, hemoptysis, shortness of breath and wheezing.    Endocrine: Negative for cold intolerance and heat intolerance.   Hematologic/Lymphatic: Negative for bleeding problem. Does not bruise/bleed easily.   Skin:  Negative for color change.   Musculoskeletal:  Negative for falls, muscle weakness and myalgias.   Gastrointestinal:  Negative for abdominal pain, heartburn, hematemesis, hematochezia, hemorrhoids, jaundice, melena, nausea and vomiting.   Genitourinary:  Negative for dysuria, hematuria and nocturia.   Neurological:  Negative for dizziness, focal  weakness, headaches, light-headedness, loss of balance, numbness, vertigo and weakness.   Psychiatric/Behavioral:  Negative for altered mental status, depression and memory loss. The patient is not nervous/anxious.    Allergic/Immunologic: Negative for hives and persistent infections.       Physical Exam    Temp:  [97.6 °F (36.4 °C)-98 °F (36.7 °C)]   Pulse:  [76-81]   Resp:  [12-17]   BP: (145-152)/(65-70)   SpO2:  [91 %-97 %]    Wt Readings from Last 1 Encounters:   07/10/24 89.6 kg (197 lb 8.5 oz)     Physical Exam  Vitals and nursing note reviewed.   Constitutional:       General: She is not in acute distress.     Appearance: She is obese. She is not toxic-appearing or diaphoretic.   HENT:      Head: Normocephalic and atraumatic.      Mouth/Throat:      Lips: Pink.      Mouth: Mucous membranes are moist.   Eyes:      General: No scleral icterus.     Conjunctiva/sclera: Conjunctivae normal.   Neck:      Thyroid: No thyromegaly.      Vascular: No carotid bruit, hepatojugular reflux or JVD.      Trachea: Trachea normal.   Cardiovascular:      Rate and Rhythm: Normal rate and regular rhythm. No extrasystoles are present.     Chest Wall: PMI is not displaced.      Pulses:           Carotid pulses are 2+ on the right side and 2+ on the left side.       Radial pulses are 2+ on the right side and 2+ on the left side.      Heart sounds: S1 normal and S2 normal. No murmur heard.     No friction rub. No S3 or S4 sounds.   Pulmonary:      Effort: Pulmonary effort is normal. No accessory muscle usage or respiratory distress.      Breath sounds: Normal breath sounds and air entry. No decreased breath sounds, wheezing, rhonchi or rales.   Abdominal:      General: Bowel sounds are normal. There is no distension or abdominal bruit.      Palpations: Abdomen is soft. There is no hepatomegaly, splenomegaly or pulsatile mass.      Tenderness: There is no abdominal tenderness.   Musculoskeletal:         General: No tenderness or  deformity.      Right lower leg: No edema.      Left lower leg: No edema.   Skin:     General: Skin is warm and dry.      Capillary Refill: Capillary refill takes less than 2 seconds.      Coloration: Skin is not cyanotic or pale.      Nails: There is no clubbing.   Neurological:      General: No focal deficit present.      Mental Status: She is alert and oriented to person, place, and time.   Psychiatric:         Attention and Perception: Attention normal.         Mood and Affect: Mood normal.         Speech: Speech normal.         Behavior: Behavior normal. Behavior is cooperative.         Telemetry  Sinus rhythm    EKG  Sinus rhythm.  Nonspecific ST-T abnormality.  Electrocardiogram is not significantly changed when compared to prior electrocardiograms.  There been no serial changes since admission.    Echocardiogram 6/21/2024    Left Ventricle: The left ventricle is normal in size. Normal wall thickness. Global hypokinesis present. There is mildly reduced systolic function with a visually estimated ejection fraction of 40 - 50%. There is normal diastolic function.    Right Ventricle: Normal right ventricular cavity size. Wall thickness is normal. Systolic function is normal.    Pulmonary Artery: There is mild pulmonary hypertension. The estimated pulmonary artery systolic pressure is 45 mmHg.    Labs  Recent Results (from the past 72 hour(s))   CBC auto differential    Collection Time: 07/18/24  6:05 PM   Result Value Ref Range    WBC 11.33 3.90 - 12.70 K/uL    RBC 3.57 (L) 4.00 - 5.40 M/uL    Hemoglobin 10.2 (L) 12.0 - 16.0 g/dL    Hematocrit 32.0 (L) 37.0 - 48.5 %    MCV 90 82 - 98 fL    MCH 28.6 27.0 - 31.0 pg    MCHC 31.9 (L) 32.0 - 36.0 g/dL    RDW 17.5 (H) 11.5 - 14.5 %    Platelets 249 150 - 450 K/uL    MPV 10.8 9.2 - 12.9 fL    Immature Granulocytes 0.4 0.0 - 0.5 %    Gran # (ANC) 6.9 1.8 - 7.7 K/uL    Immature Grans (Abs) 0.05 (H) 0.00 - 0.04 K/uL    Lymph # 2.9 1.0 - 4.8 K/uL    Mono # 0.8 0.3 - 1.0  K/uL    Eos # 0.6 (H) 0.0 - 0.5 K/uL    Baso # 0.04 0.00 - 0.20 K/uL    nRBC 0 0 /100 WBC    Gran % 60.8 38.0 - 73.0 %    Lymph % 25.8 18.0 - 48.0 %    Mono % 7.1 4.0 - 15.0 %    Eosinophil % 5.5 0.0 - 8.0 %    Basophil % 0.4 0.0 - 1.9 %    Differential Method Automated    Comprehensive metabolic panel    Collection Time: 07/18/24  6:05 PM   Result Value Ref Range    Sodium 143 136 - 145 mmol/L    Potassium 3.2 (L) 3.5 - 5.1 mmol/L    Chloride 117 (H) 95 - 110 mmol/L    CO2 18 (L) 23 - 29 mmol/L    Glucose 167 (H) 70 - 110 mg/dL    BUN 14 8 - 23 mg/dL    Creatinine 0.7 0.5 - 1.4 mg/dL    Calcium 6.9 (LL) 8.7 - 10.5 mg/dL    Total Protein 4.8 (L) 6.0 - 8.4 g/dL    Albumin 2.3 (L) 3.5 - 5.2 g/dL    Total Bilirubin 0.1 0.1 - 1.0 mg/dL    Alkaline Phosphatase 61 55 - 135 U/L    AST 13 10 - 40 U/L    ALT 10 10 - 44 U/L    eGFR >60 >60 mL/min/1.73 m^2    Anion Gap 8 8 - 16 mmol/L   Troponin I #1    Collection Time: 07/18/24  6:05 PM   Result Value Ref Range    Troponin I <0.006 0.000 - 0.026 ng/mL   B-Type natriuretic peptide (BNP)    Collection Time: 07/18/24  6:05 PM   Result Value Ref Range     (H) 0 - 99 pg/mL   Lipase    Collection Time: 07/18/24  6:05 PM   Result Value Ref Range    Lipase 31 4 - 60 U/L   Urinalysis, Reflex to Urine Culture Urine, Clean Catch    Collection Time: 07/18/24  7:44 PM    Specimen: Urine   Result Value Ref Range    Specimen UA Urine, Clean Catch     Color, UA Yellow Yellow, Straw, Aye    Appearance, UA Clear Clear    pH, UA 5.0 5.0 - 8.0    Specific Gravity, UA 1.010 1.005 - 1.030    Protein, UA Negative Negative    Glucose, UA Negative Negative    Ketones, UA Negative Negative    Bilirubin (UA) Negative Negative    Occult Blood UA Negative Negative    Nitrite, UA Negative Negative    Urobilinogen, UA Negative <2.0 EU/dL    Leukocytes, UA 3+ (A) Negative   Urinalysis Microscopic    Collection Time: 07/18/24  7:44 PM   Result Value Ref Range    RBC, UA 1 0 - 4 /hpf    WBC, UA 6 (H)  0 - 5 /hpf    Squam Epithel, UA 1 /hpf    Microscopic Comment SEE COMMENT    Troponin I #2    Collection Time: 07/18/24  7:47 PM   Result Value Ref Range    Troponin I <0.006 0.000 - 0.026 ng/mL   Magnesium    Collection Time: 07/18/24  7:47 PM   Result Value Ref Range    Magnesium 1.8 1.6 - 2.6 mg/dL   Phosphorus    Collection Time: 07/18/24  7:47 PM   Result Value Ref Range    Phosphorus 4.9 (H) 2.7 - 4.5 mg/dL   POCT glucose    Collection Time: 07/18/24  9:03 PM   Result Value Ref Range    POCT Glucose 198 (H) 70 - 110 mg/dL   Troponin I    Collection Time: 07/18/24  9:51 PM   Result Value Ref Range    Troponin I 0.007 0.000 - 0.026 ng/mL   POCT glucose    Collection Time: 07/18/24 10:28 PM   Result Value Ref Range    POCT Glucose 208 (H) 70 - 110 mg/dL   Troponin I    Collection Time: 07/19/24  2:03 AM   Result Value Ref Range    Troponin I 0.008 0.000 - 0.026 ng/mL   CBC auto differential    Collection Time: 07/19/24  5:18 AM   Result Value Ref Range    WBC 9.13 3.90 - 12.70 K/uL    RBC 3.47 (L) 4.00 - 5.40 M/uL    Hemoglobin 9.9 (L) 12.0 - 16.0 g/dL    Hematocrit 31.1 (L) 37.0 - 48.5 %    MCV 90 82 - 98 fL    MCH 28.5 27.0 - 31.0 pg    MCHC 31.8 (L) 32.0 - 36.0 g/dL    RDW 17.4 (H) 11.5 - 14.5 %    Platelets 230 150 - 450 K/uL    MPV 11.2 9.2 - 12.9 fL    Immature Granulocytes 0.3 0.0 - 0.5 %    Gran # (ANC) 5.1 1.8 - 7.7 K/uL    Immature Grans (Abs) 0.03 0.00 - 0.04 K/uL    Lymph # 2.8 1.0 - 4.8 K/uL    Mono # 0.7 0.3 - 1.0 K/uL    Eos # 0.6 (H) 0.0 - 0.5 K/uL    Baso # 0.04 0.00 - 0.20 K/uL    nRBC 0 0 /100 WBC    Gran % 55.4 38.0 - 73.0 %    Lymph % 30.1 18.0 - 48.0 %    Mono % 7.6 4.0 - 15.0 %    Eosinophil % 6.2 0.0 - 8.0 %    Basophil % 0.4 0.0 - 1.9 %    Differential Method Automated    Comprehensive metabolic panel    Collection Time: 07/19/24  5:18 AM   Result Value Ref Range    Sodium 137 136 - 145 mmol/L    Potassium 5.3 (H) 3.5 - 5.1 mmol/L    Chloride 106 95 - 110 mmol/L    CO2 24 23 - 29 mmol/L     Glucose 369 (H) 70 - 110 mg/dL    BUN 16 8 - 23 mg/dL    Creatinine 1.1 0.5 - 1.4 mg/dL    Calcium 9.6 8.7 - 10.5 mg/dL    Total Protein 6.2 6.0 - 8.4 g/dL    Albumin 3.0 (L) 3.5 - 5.2 g/dL    Total Bilirubin 0.3 0.1 - 1.0 mg/dL    Alkaline Phosphatase 78 55 - 135 U/L    AST 14 10 - 40 U/L    ALT 13 10 - 44 U/L    eGFR 51 (A) >60 mL/min/1.73 m^2    Anion Gap 7 (L) 8 - 16 mmol/L   POCT glucose    Collection Time: 07/19/24  8:01 AM   Result Value Ref Range    POCT Glucose 468 (HH) 70 - 110 mg/dL   POCT glucose    Collection Time: 07/19/24  9:22 AM   Result Value Ref Range    POCT Glucose 479 (HH) 70 - 110 mg/dL        Imaging  X-Ray Chest AP Portable    Result Date: 7/18/2024  EXAMINATION: XR CHEST AP PORTABLE CLINICAL HISTORY: Chest Pain; TECHNIQUE: Single frontal view of the chest was performed. COMPARISON: 06/23/2024 FINDINGS: The lungs are clear, with normal appearance of pulmonary vasculature and no pleural effusion or pneumothorax. The cardiac silhouette is normal in size. The hilar and mediastinal contours are unremarkable. Bones are intact.     No acute abnormality. Electronically signed by: Duane Mims Date:    07/18/2024 Time:    18:11    US Abdomen Limited_Kidney    Result Date: 6/29/2024  EXAMINATION: US ABDOMEN LIMITED_KIDNEY CLINICAL HISTORY: RANJEET, nephrolithiasis; TECHNIQUE: Ultrasound of the kidneys and urinary bladder was performed including color flow and Doppler evaluation of the kidneys. COMPARISON: Ultrasound 06/21/2024, 03/14/2024 FINDINGS: Right kidney: The right kidney measures 11.9 cm. No cortical thinning or loss of corticomedullary distinction. Resistive index measures 0.82.  No mass. No renal stone. No hydronephrosis. Left kidney: The left kidney measures 12.9 cm. No cortical thinning or loss of corticomedullary distinction. Resistive index measures 0.81.  No mass. No renal stone. No hydronephrosis. Splenic resistive index measures 0.71. The bladder is partially distended at the time  of scanning and has an unremarkable appearance.     Elevated resistive indices bilaterally, nonspecific but can be seen with medical renal disease. Electronically signed by resident: Lucille Benjamin Date:    06/28/2024 Time:    18:08 Electronically signed by: Nick Medrano MD Date:    06/29/2024 Time:    09:51    US Kidney    Result Date: 6/29/2024  EXAMINATION: US ABDOMEN LIMITED_KIDNEY CLINICAL HISTORY: RANJEET, nephrolithiasis; TECHNIQUE: Ultrasound of the kidneys and urinary bladder was performed including color flow and Doppler evaluation of the kidneys. COMPARISON: Ultrasound 06/21/2024, 03/14/2024 FINDINGS: Right kidney: The right kidney measures 11.9 cm. No cortical thinning or loss of corticomedullary distinction. Resistive index measures 0.82.  No mass. No renal stone. No hydronephrosis. Left kidney: The left kidney measures 12.9 cm. No cortical thinning or loss of corticomedullary distinction. Resistive index measures 0.81.  No mass. No renal stone. No hydronephrosis. Splenic resistive index measures 0.71. The bladder is partially distended at the time of scanning and has an unremarkable appearance.    IMPRESSION: Elevated resistive indices bilaterally, nonspecific but can be seen with medical renal disease. Electronically signed by resident: Lucille Benjamin Date:\X09\\X09\\X09\\X09\06/28/2024 Time:\X09\\X09\\X09\\X09\18:08 Electronically signed by:\X09\Nick Medrano MD Date:\X09\\X09\\X09\\X09\06/29/2024 Time:\X09\\X09\\X09\\X09\09:51    X-Ray Chest 1 View    Result Date: 6/23/2024  EXAMINATION: XR CHEST 1 VIEW CLINICAL HISTORY: evaluate for pneumonia; TECHNIQUE: Single frontal view of the chest was performed. COMPARISON: 06/21/2024 FINDINGS: Improvement in the degree of aeration of the lungs from prior examination.  Atherosclerotic change within the aorta.The lungs are clear with normal appearance of pulmonary vasculature. No pleural effusion. No evident pneumothorax. The cardiac silhouette is normal in size.  The hilar and mediastinal contours are unremarkable. Bones are intact.     No acute abnormality.  Improvement in aeration of lungs. Electronically signed by: Domingo Torres MD Date:    06/23/2024 Time:    07:45    US Retroperitoneal Complete    Result Date: 6/22/2024  EXAMINATION: US RETROPERITONEAL COMPLETE CLINICAL HISTORY: acute renal failure; TECHNIQUE: Ultrasound of the kidneys and urinary bladder was performed including color flow and Doppler evaluation of the kidneys. COMPARISON: None. FINDINGS: Right kidney: The right kidney measures 10.1 x 4.2 x 3.6 cm.  No cortical thinning. No loss of corticomedullary distinction. Resistive index measures 0.74.  No mass. No renal stone. No hydronephrosis. Left kidney: The left kidney measures 10.4 x 3.7 x 3.1 cm. No cortical thinning. No loss of corticomedullary distinction. Resistive index measures 0.81.  No mass. No renal stone. No hydronephrosis. The bladder is partially distended at the time of scanning and has an unremarkable appearance.     No evidence for hydronephrosis on either side. Elevated resistive indices within the kidneys bilaterally which can be seen with medical renal disease. Electronically signed by: Vishnu Paulino MD Date:    06/22/2024 Time:    08:48    Echo    Result Date: 6/21/2024    Left Ventricle: The left ventricle is normal in size. Normal wall thickness. Global hypokinesis present. There is mildly reduced systolic function with a visually estimated ejection fraction of 40 - 50%. There is normal diastolic function.   Right Ventricle: Normal right ventricular cavity size. Wall thickness is normal. Systolic function is normal.   Pulmonary Artery: There is mild pulmonary hypertension. The estimated pulmonary artery systolic pressure is 45 mmHg.     X-Ray Knee 1 or 2 View Left    Result Date: 6/21/2024  EXAMINATION: XR KNEE 1 OR 2 VIEW LEFT CLINICAL HISTORY: left knee; TECHNIQUE: One or two views of the left knee were performed. COMPARISON:  03/14/2024. FINDINGS: There is osteopenia.  There is no acute fracture or dislocation. Alignment is normal. Joint spaces are preserved. No joint effusion.     No acute osseous abnormality of the knee. Electronically signed by: Ramin Krishnamurthy Date:    06/21/2024 Time:    08:40    X-Ray Chest AP Portable    Result Date: 6/21/2024  EXAMINATION: XR CHEST AP PORTABLE CLINICAL HISTORY: Acute Respiratory Failure; TECHNIQUE: Single frontal view of the chest was performed. COMPARISON: 06/20/2024 FINDINGS: Cardiac monitoring leads overlie the chest.  The cardiomediastinal silhouette is unchanged in size and configuration.  The lungs are symmetrically expanded with interval detrimental change in lung aeration with new bilateral mixed interstitial and more focal airspace opacities with a bibasilar predominance.  Findings can be seen with edema, infectious or non-infectious inflammatory process.  No evidence of pneumothorax. This report was flagged in Epic as abnormal.     Please see above. Electronically signed by: Shantel Gonzalez MD Date:    06/21/2024 Time:    05:09    X-Ray Chest 1 View    Result Date: 6/20/2024  EXAMINATION: XR CHEST 1 VIEW CLINICAL HISTORY: SOB; TECHNIQUE: Single frontal view of the chest was performed. COMPARISON: 06/20/2024 FINDINGS: Lungs are well expanded.  No acute consolidation, pleural effusion, or pneumothorax. Cardiac silhouette is normal in size when accounting for technique.  Calcified plaque lines arch     No acute cardiopulmonary process seen. Electronically signed by: Karon Pennington Date:    06/20/2024 Time:    14:45    X-Ray Chest PA And Lateral    Result Date: 6/20/2024  EXAMINATION: XR CHEST PA AND LATERAL CLINICAL HISTORY: Shortness of breath TECHNIQUE: PA and lateral views of the chest were performed. COMPARISON: 05/22/2024 and 09/27/2022 FINDINGS: Cardiac silhouette is at the upper limits of normal size and unchanged.  Tortuous thoracic aorta and brachiocephalic vessels with atherosclerotic  calcification in the wall of the aortic arch.  Pulmonary vascularity does not appear congested.  Lungs are satisfactorily expanded and appear free of active disease.  No pleural fluid or pneumothorax.  Accentuation of the thoracic kyphosis.  Soft tissues and osseous structures are otherwise unremarkable.     No acute cardiopulmonary disease and no significant interval change Electronically signed by: Alfred Vuong MD Date:    06/20/2024 Time:    12:08      Assessment    Chest pain   Noncardiac.  Cardiac enzymes and electrocardiogram showed no evidence of acute ischemia    Coronary atherosclerosis   Stable and free of angina    Chronic systolic heart failure   Mild left ventricular systolic dysfunction.  Compensated.    Plan and Discussion    No further ischemic workup is warranted.  Resume her usual, routine outpatient follow-up.      Jose L Méndez MD

## 2024-07-19 NOTE — TELEPHONE ENCOUNTER
----- Message from Charissa Del Cid MA sent at 7/18/2024  4:21 PM CDT -----  Regarding: FW: [facility] called in regards to below Rx wld like to speak with someone to see if the Rx can be changed  Change to Humalog  ----- Message -----  From: Vonnie Machado  Sent: 7/18/2024   2:43 PM CDT  To: Winston Giordano Staff  Subject: [facility] called in regards to below Rx wld like #    Pharmacy Calling to Clarify an RX       Name of Caller# Fany       Pharmacy Name# [facility]       Prescription Name# NOVOLOG FLEXPEN U-100 INSULIN 100 unit/mL (3 mL) InPn pen       What do they need to clarify?Phar called in regards to below Rx wld like to speak with someone to see if the Rx can be changed       Best Call Back Number [phone] Ext [phone]       Additional Information:

## 2024-07-19 NOTE — PROGRESS NOTES
[facility] ED Observation Unit  Progress Note      HPI   79-year-old female with past medical history of CAD, hyperlipidemia, hypertension, lupus, fibromyalgia, diabetes, history of CVA with possible residual left-sided weakness presenting with chest pain for about 1 hour prior to arrival. Patient reports she took a nap and has been undergoing a lot of stress with recent family who does not live here calling welfare checks on her. She reports they did yesterday and she did not have any chest pain but today she had chest pain associated with it. She reports taking a nitro at home and getting in additional to nitro with EMS with resolution in her symptoms. She reports a little bit of nausea as well, she denies any shortness of breath, abdominal pain, leg swelling, fevers or chills, or any additional associated complaints.      ED Course:  On review of labs, no leukocytosis.  Stable anemia at her baseline.  Sodium within normal limits.  Mild hypokalemia of 3.2.  Replenished with oral potassium.  Serum glucose elevated at 167.  Hypocalcemia at 6.9.  Replenished with oral calcium carbonate.  Total protein and albumin decreased at 4.8 and 2.3.  Lipase within normal limits.  BNP mildly elevated at 211.  Initial troponin is negative.  Magnesium within normal limits.  Mild hyperphosphatemia at 4.9.  UA is pending.  No acute process on chest x-ray. EKG showing sinus rhythm rate of 79 beats per minute, occasional PVCs, nonspecific ST and T-wave changes noted inferiorly and laterally,  MS, no STEMI.  On my assessment, patient reports some pressure to the left side of her chest and nausea. Denies any sharp or stabbing pain. She denies fever, cough, shortness for breath, palpitations, chest tightness, back pain, or leg swelling. Plan for serial troponins and cardiology consult in the morning.    Interval History   Evaluated by Cardiology who had no further recommendations since it was not believe symptoms are likely  related to any cardiac issues.  Was placed on 2 L nasal cannula at some point in the night for low pulse ox, she does not wear oxygen at home she reports mild shortness of breath.  And had a 6 minute walk test performed by Respiratory and was noted to be hypoxic at 86%.  Plan for transferred to Hospital Medicine for continued evaluation of her COPD versus CHF exacerbation.  She was given DuoNebs and Lasix while in the EDOU for    PMHx   Past Medical History:   Diagnosis Date    Arthritis     Back pain     Cancer     ovarian    Cervical cancer     Coronary artery disease     Depression     Diabetes mellitus     Fibromyalgia     Heart attack     History of MI (myocardial infarction)     Hyperlipidemia     Hypertension     Lupus     Stroke     slight left sided weakness      Past Surgical History:   Procedure Laterality Date    APPENDECTOMY       SECTION      2    CORONARY ANGIOGRAPHY N/A 2022    Procedure: ANGIOGRAM, CORONARY ARTERY;  Surgeon: Jose L Méndez MD;  Location: [facility] CATH LAB;  Service: Cardiology;  Laterality: N/A;    HYSTERECTOMY      with USO for cervical cancer    INJECTION OF ANESTHETIC AGENT AROUND NERVE Bilateral 2021    Procedure: BLOCK, NERVE, SYMPATHIC;  Surgeon: Holden Pereira MD;  Location: [facility] PAIN MGT;  Service: Pain Management;  Laterality: Bilateral;    INJECTION OF ANESTHETIC AGENT AROUND NERVE N/A 2021    Procedure: BLOCK, NERVE, SYMPATHETIC  need consent;  Surgeon: Holden Pereira MD;  Location: [facility] PAIN MGT;  Service: Pain Management;  Laterality: N/A;    YARED LINK,SWALLOW FUNCTION,CINE/VIDEO RECORD  2021         TONSILLECTOMY      TRIAL OF SPINAL CORD NERVE STIMULATOR N/A 3/8/2023    Procedure: LUMBAR SPINAL CORD STIMULATOR TRIAL NEVRO REP PATIENT STATES SHE NO LONGER TAKES PLAVIX;  Surgeon: Holden Pereira MD;  Location: [facility] PAIN MGT;  Service: Pain Management;  Laterality: N/A;        Family Hx   Family History   Problem Relation Name Age of  Onset    COPD Mother      Lupus Mother      Hernia Mother      Uterine cancer Mother          vs cervical cancer    Ovarian cancer Mother      Diabetes Father      Coronary artery disease Father      Colon cancer Maternal Grandmother          in her 50's        Social Hx   Social History     Socioeconomic History    Marital status:    Tobacco Use    Smoking status: Former     Current packs/day: 0.00     Types: Cigarettes     Quit date: 11/2020     Years since quitting: 3.7     Passive exposure: Past    Smokeless tobacco: Never   Substance and Sexual Activity    Alcohol use: Not Currently     Comment: occasionally    Drug use: Yes     Types: Hydrocodone     Comment: three times a day    Sexual activity: Not Currently   Social History Narrative    Lives with daughter. .      Social Determinants of Health     Financial Resource Strain: Low Risk  (7/10/2024)    Overall Financial Resource Strain (CARDIA)     Difficulty of Paying Living Expenses: Not hard at all   Food Insecurity: No Food Insecurity (7/10/2024)    Hunger Vital Sign     Worried About Running Out of Food in the Last Year: Never true     Ran Out of Food in the Last Year: Never true   Transportation Needs: No Transportation Needs (7/10/2024)    PRAPARE - Transportation     Lack of Transportation (Medical): No     Lack of Transportation (Non-Medical): No   Physical Activity: Sufficiently Active (7/10/2024)    Exercise Vital Sign     Days of Exercise per Week: 7 days     Minutes of Exercise per Session: 40 min   Recent Concern: Physical Activity - Inactive (6/21/2024)    Exercise Vital Sign     Days of Exercise per Week: 0 days     Minutes of Exercise per Session: 0 min   Stress: No Stress Concern Present (7/10/2024)    [employer] - Occupational Stress Questionnaire     Feeling of Stress : Not at all   Housing Stability: Low Risk  (7/10/2024)    Housing Stability Vital Sign     Unable to Pay for Housing in the Last  Year: No     Homeless in the Last Year: No        Vital Signs   Vitals:    24 1424 24 1603 24 1606 24 1722   BP:   (!) 166/74    BP Location:       Patient Position:       Pulse: 78  78 88   Resp: 18 18 18 (!) 22   Temp:   97.4 °F (36.3 °C)    TempSrc:       SpO2:   95% 95%        Review of Systems  Review of Systems   Constitutional:  Negative for chills and fever.   Respiratory:  Positive for shortness of breath. Negative for cough.    Cardiovascular:  Positive for leg swelling. Negative for chest pain (Resolved).   Neurological:  Negative for weakness and headaches.   All other systems reviewed and are negative.      Brief Physical Exam/Reassessment   Physical Exam    Nursing note and vitals reviewed.  Constitutional: She appears well-developed and well-nourished. She does not appear ill. No distress.   Neck: Neck supple.   Cardiovascular:  Normal rate, regular rhythm, S1 normal, S2 normal and normal heart sounds.           Pulmonary/Chest: Effort normal. No tachypnea. She has decreased breath sounds. She has no wheezes.   Abdominal: Abdomen is soft and flat. There is no abdominal tenderness.   Musculoskeletal:      Cervical back: Neck supple.      Right lower le+ Edema present.      Left lower le+ Edema present.     Neurological: She is alert and oriented to person, place, and time. GCS eye subscore is 4. GCS verbal subscore is 5. GCS motor subscore is 6.   Skin: Skin is warm, dry and intact.   Psychiatric: She has a normal mood and affect. Her behavior is normal.         Labs/Imaging   Labs Reviewed   CBC W/ AUTO DIFFERENTIAL - Abnormal       Result Value    WBC 11.33      RBC 3.57 (*)     Hemoglobin 10.2 (*)     Hematocrit 32.0 (*)     MCV 90      MCH 28.6      MCHC 31.9 (*)     RDW 17.5 (*)     Platelets 249      MPV 10.8      Immature Granulocytes 0.4      Gran # (ANC) 6.9      Immature Grans (Abs) 0.05 (*)     Lymph # 2.9      Mono # 0.8      Eos # 0.6 (*)     Baso # 0.04       nRBC 0      Gran % 60.8      Lymph % 25.8      Mono % 7.1      Eosinophil % 5.5      Basophil % 0.4      Differential Method Automated     COMPREHENSIVE METABOLIC PANEL - Abnormal    Sodium 143      Potassium 3.2 (*)     Chloride 117 (*)     CO2 18 (*)     Glucose 167 (*)     BUN 14      Creatinine 0.7      Calcium 6.9 (*)     Total Protein 4.8 (*)     Albumin 2.3 (*)     Total Bilirubin 0.1      Alkaline Phosphatase 61      AST 13      ALT 10      eGFR >60      Anion Gap 8      Narrative:      CALCIUM  critical result(s) called and verbal readback obtained from   TOM HANNAH RN. by TL8 07/18/2024 18:50   B-TYPE NATRIURETIC PEPTIDE - Abnormal     (*)    URINALYSIS, REFLEX TO URINE CULTURE - Abnormal    Specimen UA Urine, Clean Catch      Color, UA Yellow      Appearance, UA Clear      pH, UA 5.0      Specific Gravity, UA 1.010      Protein, UA Negative      Glucose, UA Negative      Ketones, UA Negative      Bilirubin (UA) Negative      Occult Blood UA Negative      Nitrite, UA Negative      Urobilinogen, UA Negative      Leukocytes, UA 3+ (*)     Narrative:     Specimen Source->Urine   PHOSPHORUS - Abnormal    Phosphorus 4.9 (*)    URINALYSIS MICROSCOPIC - Abnormal    RBC, UA 1      WBC, UA 6 (*)     Squam Epithel, UA 1      Microscopic Comment SEE COMMENT      Narrative:     Specimen Source->Urine   CBC W/ AUTO DIFFERENTIAL - Abnormal    WBC 9.13      RBC 3.47 (*)     Hemoglobin 9.9 (*)     Hematocrit 31.1 (*)     MCV 90      MCH 28.5      MCHC 31.8 (*)     RDW 17.4 (*)     Platelets 230      MPV 11.2      Immature Granulocytes 0.3      Gran # (ANC) 5.1      Immature Grans (Abs) 0.03      Lymph # 2.8      Mono # 0.7      Eos # 0.6 (*)     Baso # 0.04      nRBC 0      Gran % 55.4      Lymph % 30.1      Mono % 7.6      Eosinophil % 6.2      Basophil % 0.4      Differential Method Automated     COMPREHENSIVE METABOLIC PANEL - Abnormal    Sodium 137      Potassium 5.3 (*)     Chloride 106      CO2 24       Glucose 369 (*)     BUN 16      Creatinine 1.1      Calcium 9.6      Total Protein 6.2      Albumin 3.0 (*)     Total Bilirubin 0.3      Alkaline Phosphatase 78      AST 14      ALT 13      eGFR 51 (*)     Anion Gap 7 (*)    POTASSIUM - Abnormal    Potassium 5.3 (*)    POCT GLUCOSE - Abnormal    POCT Glucose 198 (*)    POCT GLUCOSE - Abnormal    POCT Glucose 208 (*)    POCT GLUCOSE - Abnormal    POCT Glucose 468 (*)    POCT GLUCOSE - Abnormal    POCT Glucose 479 (*)    POCT GLUCOSE - Abnormal    POCT Glucose 390 (*)    POCT GLUCOSE - Abnormal    POCT Glucose 453 (*)    TROPONIN I    Troponin I <0.006     TROPONIN I    Troponin I <0.006     LIPASE    Lipase 31     MAGNESIUM    Magnesium 1.8     TROPONIN I    Troponin I 0.007     TROPONIN I    Troponin I 0.008        Imaging Results              X-Ray Chest AP Portable (Final result)  Result time 07/18/24 18:11:00      Final result by Duane Mims MD (07/18/24 18:11:00)                   Impression:      No acute abnormality.      Electronically signed by: Duane Mims  Date:    07/18/2024  Time:    18:11               Narrative:    EXAMINATION:  XR CHEST AP PORTABLE    CLINICAL HISTORY:  Chest Pain;    TECHNIQUE:  Single frontal view of the chest was performed.    COMPARISON:  06/23/2024    FINDINGS:  The lungs are clear, with normal appearance of pulmonary vasculature and no pleural effusion or pneumothorax.    The cardiac silhouette is normal in size. The hilar and mediastinal contours are unremarkable.    Bones are intact.                                       I reviewed all labs, imaging, EKGs.     Plan   1. Chest pain    2. CAD (coronary artery disease)

## 2024-07-20 VITALS
WEIGHT: 192 LBS | HEIGHT: 60 IN | SYSTOLIC BLOOD PRESSURE: 143 MMHG | HEART RATE: 74 BPM | OXYGEN SATURATION: 97 % | DIASTOLIC BLOOD PRESSURE: 70 MMHG | TEMPERATURE: 98 F | BODY MASS INDEX: 37.69 KG/M2 | RESPIRATION RATE: 19 BRPM

## 2024-07-20 LAB
ALBUMIN SERPL BCP-MCNC: 3.2 G/DL (ref 3.5–5.2)
ALP SERPL-CCNC: 82 U/L (ref 55–135)
ALT SERPL W/O P-5'-P-CCNC: 13 U/L (ref 10–44)
ANION GAP SERPL CALC-SCNC: 13 MMOL/L (ref 8–16)
AST SERPL-CCNC: 12 U/L (ref 10–40)
BASOPHILS # BLD AUTO: 0.06 K/UL (ref 0–0.2)
BASOPHILS NFR BLD: 0.6 % (ref 0–1.9)
BILIRUB SERPL-MCNC: 0.3 MG/DL (ref 0.1–1)
BUN SERPL-MCNC: 22 MG/DL (ref 8–23)
CALCIUM SERPL-MCNC: 9.4 MG/DL (ref 8.7–10.5)
CHLORIDE SERPL-SCNC: 99 MMOL/L (ref 95–110)
CO2 SERPL-SCNC: 24 MMOL/L (ref 23–29)
CREAT SERPL-MCNC: 1.1 MG/DL (ref 0.5–1.4)
DIFFERENTIAL METHOD BLD: ABNORMAL
EOSINOPHIL # BLD AUTO: 0.5 K/UL (ref 0–0.5)
EOSINOPHIL NFR BLD: 5.6 % (ref 0–8)
ERYTHROCYTE [DISTWIDTH] IN BLOOD BY AUTOMATED COUNT: 17.2 % (ref 11.5–14.5)
EST. GFR  (NO RACE VARIABLE): 51 ML/MIN/1.73 M^2
GLUCOSE SERPL-MCNC: 346 MG/DL (ref 70–110)
HCT VFR BLD AUTO: 32.3 % (ref 37–48.5)
HGB BLD-MCNC: 10.4 G/DL (ref 12–16)
IMM GRANULOCYTES # BLD AUTO: 0.04 K/UL (ref 0–0.04)
IMM GRANULOCYTES NFR BLD AUTO: 0.4 % (ref 0–0.5)
LYMPHOCYTES # BLD AUTO: 3.3 K/UL (ref 1–4.8)
LYMPHOCYTES NFR BLD: 34.2 % (ref 18–48)
MAGNESIUM SERPL-MCNC: 1.7 MG/DL (ref 1.6–2.6)
MCH RBC QN AUTO: 28.6 PG (ref 27–31)
MCHC RBC AUTO-ENTMCNC: 32.2 G/DL (ref 32–36)
MCV RBC AUTO: 89 FL (ref 82–98)
MONOCYTES # BLD AUTO: 0.7 K/UL (ref 0.3–1)
MONOCYTES NFR BLD: 7.7 % (ref 4–15)
NEUTROPHILS # BLD AUTO: 5 K/UL (ref 1.8–7.7)
NEUTROPHILS NFR BLD: 51.5 % (ref 38–73)
NRBC BLD-RTO: 0 /100 WBC
PLATELET # BLD AUTO: 235 K/UL (ref 150–450)
PMV BLD AUTO: 11.3 FL (ref 9.2–12.9)
POCT GLUCOSE: 319 MG/DL (ref 70–110)
POCT GLUCOSE: 339 MG/DL (ref 70–110)
POTASSIUM SERPL-SCNC: 4 MMOL/L (ref 3.5–5.1)
PROT SERPL-MCNC: 6.7 G/DL (ref 6–8.4)
RBC # BLD AUTO: 3.64 M/UL (ref 4–5.4)
SODIUM SERPL-SCNC: 136 MMOL/L (ref 136–145)
WBC # BLD AUTO: 9.67 K/UL (ref 3.9–12.7)

## 2024-07-20 PROCEDURE — 25000003 PHARM REV CODE 250

## 2024-07-20 PROCEDURE — 94640 AIRWAY INHALATION TREATMENT: CPT

## 2024-07-20 PROCEDURE — 85025 COMPLETE CBC W/AUTO DIFF WBC: CPT | Performed by: NURSE PRACTITIONER

## 2024-07-20 PROCEDURE — 63600175 PHARM REV CODE 636 W HCPCS: Performed by: INTERNAL MEDICINE

## 2024-07-20 PROCEDURE — 36415 COLL VENOUS BLD VENIPUNCTURE: CPT | Performed by: NURSE PRACTITIONER

## 2024-07-20 PROCEDURE — A4216 STERILE WATER/SALINE, 10 ML: HCPCS

## 2024-07-20 PROCEDURE — 27000221 HC OXYGEN, UP TO 24 HOURS

## 2024-07-20 PROCEDURE — 99900035 HC TECH TIME PER 15 MIN (STAT)

## 2024-07-20 PROCEDURE — 25000003 PHARM REV CODE 250: Performed by: NURSE PRACTITIONER

## 2024-07-20 PROCEDURE — 99233 SBSQ HOSP IP/OBS HIGH 50: CPT | Mod: ,,, | Performed by: INTERNAL MEDICINE

## 2024-07-20 PROCEDURE — A4216 STERILE WATER/SALINE, 10 ML: HCPCS | Performed by: EMERGENCY MEDICINE

## 2024-07-20 PROCEDURE — 63600175 PHARM REV CODE 636 W HCPCS: Performed by: NURSE PRACTITIONER

## 2024-07-20 PROCEDURE — 25000003 PHARM REV CODE 250: Performed by: EMERGENCY MEDICINE

## 2024-07-20 PROCEDURE — 94761 N-INVAS EAR/PLS OXIMETRY MLT: CPT

## 2024-07-20 PROCEDURE — 80053 COMPREHEN METABOLIC PANEL: CPT | Performed by: NURSE PRACTITIONER

## 2024-07-20 PROCEDURE — 25000242 PHARM REV CODE 250 ALT 637 W/ HCPCS: Performed by: NURSE PRACTITIONER

## 2024-07-20 PROCEDURE — 83735 ASSAY OF MAGNESIUM: CPT | Performed by: NURSE PRACTITIONER

## 2024-07-20 RX ORDER — ALBUTEROL SULFATE 1.25 MG/3ML
1.25 SOLUTION RESPIRATORY (INHALATION) EVERY 6 HOURS PRN
Qty: 75 ML | Refills: 0 | Status: SHIPPED | OUTPATIENT
Start: 2024-07-20 | End: 2024-07-20

## 2024-07-20 RX ORDER — INSULIN ASPART 100 [IU]/ML
10 INJECTION, SOLUTION INTRAVENOUS; SUBCUTANEOUS
Status: DISCONTINUED | OUTPATIENT
Start: 2024-07-20 | End: 2024-07-20 | Stop reason: HOSPADM

## 2024-07-20 RX ORDER — ALBUTEROL SULFATE 1.25 MG/3ML
1.25 SOLUTION RESPIRATORY (INHALATION) EVERY 6 HOURS PRN
Qty: 75 ML | Refills: 0 | Status: SHIPPED | OUTPATIENT
Start: 2024-07-20 | End: 2024-07-31 | Stop reason: SDUPTHER

## 2024-07-20 RX ORDER — INSULIN ASPART 100 [IU]/ML
0-10 INJECTION, SOLUTION INTRAVENOUS; SUBCUTANEOUS
Status: DISCONTINUED | OUTPATIENT
Start: 2024-07-20 | End: 2024-07-20 | Stop reason: HOSPADM

## 2024-07-20 RX ADMIN — INSULIN ASPART 8 UNITS: 100 INJECTION, SOLUTION INTRAVENOUS; SUBCUTANEOUS at 01:07

## 2024-07-20 RX ADMIN — IPRATROPIUM BROMIDE AND ALBUTEROL SULFATE 3 ML: 2.5; .5 SOLUTION RESPIRATORY (INHALATION) at 12:07

## 2024-07-20 RX ADMIN — IPRATROPIUM BROMIDE AND ALBUTEROL SULFATE 3 ML: 2.5; .5 SOLUTION RESPIRATORY (INHALATION) at 03:07

## 2024-07-20 RX ADMIN — INSULIN ASPART 10 UNITS: 100 INJECTION, SOLUTION INTRAVENOUS; SUBCUTANEOUS at 08:07

## 2024-07-20 RX ADMIN — METOPROLOL SUCCINATE 100 MG: 50 TABLET, EXTENDED RELEASE ORAL at 08:07

## 2024-07-20 RX ADMIN — METHOCARBAMOL 500 MG: 500 TABLET ORAL at 08:07

## 2024-07-20 RX ADMIN — ASPIRIN 81 MG: 81 TABLET, COATED ORAL at 08:07

## 2024-07-20 RX ADMIN — INSULIN GLARGINE 20 UNITS: 100 INJECTION, SOLUTION SUBCUTANEOUS at 08:07

## 2024-07-20 RX ADMIN — Medication 10 ML: at 12:07

## 2024-07-20 RX ADMIN — TORSEMIDE 20 MG: 20 TABLET ORAL at 08:07

## 2024-07-20 RX ADMIN — IPRATROPIUM BROMIDE AND ALBUTEROL SULFATE 3 ML: 2.5; .5 SOLUTION RESPIRATORY (INHALATION) at 07:07

## 2024-07-20 RX ADMIN — NIFEDIPINE 30 MG: 30 TABLET, FILM COATED, EXTENDED RELEASE ORAL at 08:07

## 2024-07-20 RX ADMIN — DOCUSATE SODIUM AND SENNOSIDES 1 TABLET: 8.6; 5 TABLET, FILM COATED ORAL at 08:07

## 2024-07-20 RX ADMIN — LOSARTAN POTASSIUM 50 MG: 50 TABLET, FILM COATED ORAL at 08:07

## 2024-07-20 RX ADMIN — SERTRALINE 100 MG: 100 TABLET, FILM COATED ORAL at 08:07

## 2024-07-20 RX ADMIN — INSULIN ASPART 10 UNITS: 100 INJECTION, SOLUTION INTRAVENOUS; SUBCUTANEOUS at 01:07

## 2024-07-20 RX ADMIN — METHOCARBAMOL 500 MG: 500 TABLET ORAL at 01:07

## 2024-07-20 RX ADMIN — PREGABALIN 75 MG: 75 CAPSULE ORAL at 08:07

## 2024-07-20 RX ADMIN — HYDROCODONE BITARTRATE AND ACETAMINOPHEN 1 TABLET: 5; 325 TABLET ORAL at 08:07

## 2024-07-20 RX ADMIN — Medication 10 ML: at 05:07

## 2024-07-20 RX ADMIN — ALLOPURINOL 300 MG: 300 TABLET ORAL at 08:07

## 2024-07-20 NOTE — HPI
Ms. Waters is a 79 YOF with PMHx of HFrEF (EF 40-50% 06/2024), CAD, aortic atherosclerosis with tortuous aorta, HTN, HLD, DM type IIm chronic bronchitis, CFL, former smoker, CKD, chronic anemia, history of CVA with residual left-sided deficits, lupus, fibromyalgia, chronic pain, GERD, osteoarthritis, and anxiety/depression.    She initially presented to ED via EMS on 7/18 with complaints of CP. She reported that she has had an increase in stress due to some family dynamic difficulties and was visited by APS/[employer] for welfare checks in the last couple of days which upset her. She did take SL NTG with improvement and was given repeat dose by EMS with resolution of symptoms. She denies fever, chills, recent falls, abdominal pain, N/V/D, constipation, decreased appetite, changes in PO intake, light headiness, dizziness, or headaches.     She was placed in EDOU overnight where she remained HDS and afebrile; troponin trend negative (< 0.006 > < 0.006 > 0.007 > 0.008); CXR without acute pathology; EKG with NSR and no acute dynamic changes. She was evaluated by Cardiology and pain felt to be noncardiac in origin and no further recommendations/interventions advised. She was noted have desaturations on RA to 84-86% with recovery on 2 liters nasal cannula; she was treated with IV Lasix and DuoNebs.  6 minute walk test performed by RT with hypoxia at 86% on room air and she was upgraded to Hospital Medicine for ongoing management    The patient was admitted to the Hospital Medicine Service for further evaluation and management.

## 2024-07-20 NOTE — ASSESSMENT & PLAN NOTE
-chronic   -continue home losartan, metoprolol, nifedipine, and torsemide  -dose/medication adjustment as appropriate

## 2024-07-20 NOTE — ASSESSMENT & PLAN NOTE
Atherosclerotic heart disease of native coronary artery without angina  -placed in EDOU overnight where she remained HDS and afebrile  -troponin trend negative (< 0.006 > < 0.006 > 0.007 > 0.008); CXR without acute pathology; EKG with NSR and no acute dynamic changes  -evaluated by Cardiology and pain felt to be noncardiac in origin and no further recommendations/interventions advised  -continue home aspirin, losartan, metoprolol, and nifedipine  -dose/medication adjustment as appropriate   -EKG and SL NTG PRN chest pain  -continue tele monitoring  -trend labs, address/replete electrolytes as indicated

## 2024-07-20 NOTE — PLAN OF CARE
Follow up appointment was noted on AVS.  Pt will need lyft service for transportation to home.  Patient received etank with set up and nebulizer.  Pt awaiting delivery of medication from [facility]   07/20/24 1333   Final Note   Assessment Type Discharge Planning Assessment   Anticipated Discharge Disposition Home   What phone number can be called within the next 1-3 days to see how you are doing after discharge? [phone]   Hospital Resources/Appts/Education Provided Provided patient/caregiver with written discharge plan information;Appointments scheduled and added to AVS   Post-Acute Status   Discharge Delays None known at this time     [facility] - Med Surg ([facility])  Discharge Final Note    Primary Care Provider: Chun Zapata MD    Expected Discharge Date: 7/20/2024    Final Discharge Note (most recent)       Final Note - 07/20/24 1333          Final Note    Assessment Type Discharge Planning Assessment (P)      Anticipated Discharge Disposition Home or Self Care (P)      What phone number can be called within the next 1-3 days to see how you are doing after discharge? [phone] (P)      Hospital Resources/Appts/Education Provided Provided patient/caregiver with written discharge plan information;Appointments scheduled and added to AVS (P)         Post-Acute Status    Discharge Delays None known at this time (P)                      Important Message from Medicare  Important Message from Medicare regarding Discharge Appeal Rights: Given to patient/caregiver, Explained to patient/caregiver, Signed/date by patient/caregiver     Date IMM was signed: 07/20/24  Time IMM was signed: 0836    Contact Info       Chun Zapata MD   Specialty: Family Medicine   Relationship: PCP - General    [address] [address]   Phone: [phone]       Next Steps: Follow up in 2 week(s)    Instructions: post-hospital follow-up

## 2024-07-20 NOTE — ASSESSMENT & PLAN NOTE
CAFL   Chronic bronchitis   COPD suspected   Former smoker  -initially placed in EDOU for chest pain rule out with negative workup however had hypoxia on room air despite Lasix and DuoNeb dosing  -6 minute walk test performed 7/19 by RT with hypoxia at 86% on room air   -continue oxygen supplementation   -DuoNebs PRN   -resume home torsemide  -PRN supportive care is indicated  -home oxygen and home nebulizer orders placed in discharge

## 2024-07-20 NOTE — PROGRESS NOTES
[facility]    Admission date:  7/18/2024     Assessment    Chest pain   Resolved     Coronary atherosclerosis   Stable and free of angina     Chronic systolic heart failure   Mild left ventricular systolic dysfunction.  Compensated.    Plan and Discussion    Continue with her usual home cardiac regimen.    Subjective    Kept another night because of low oxygen saturations.  This morning she feels well but has not been out of bed yet.  Still on nasal cannula oxygen    Medications  Current Facility-Administered Medications   Medication Dose Route Frequency Provider Last Rate Last Admin    acetaminophen tablet 650 mg  650 mg Oral Q4H PRN Rina Blanc, NP        albuterol-ipratropium 2.5 mg-0.5 mg/3 mL nebulizer solution 3 mL  3 mL Nebulization Q4H Elvira Arteaga FNP   3 mL at 07/20/24 0757    albuterol-ipratropium 2.5 mg-0.5 mg/3 mL nebulizer solution 3 mL  3 mL Nebulization Q4H PRN Rina Blanc, NP        allopurinoL tablet 300 mg  300 mg Oral Daily Med Fields PA-C   300 mg at 07/20/24 0832    aspirin EC tablet 81 mg  81 mg Oral Daily Rina Blanc, NP   81 mg at 07/20/24 0833    dextrose 10% bolus 125 mL 125 mL  12.5 g Intravenous PRN Rina Blanc, NP        dextrose 10% bolus 250 mL 250 mL  25 g Intravenous PRN Rina Blanc, NP        enoxaparin injection 40 mg  40 mg Subcutaneous Daily Rina Blanc, NP   40 mg at 07/19/24 2029    glucagon (human recombinant) injection 1 mg  1 mg Intramuscular PRN Rina Blanc, NP        glucose chewable tablet 16 g  16 g Oral PRN Rina Blanc, NP        glucose chewable tablet 24 g  24 g Oral PRN Rina Blanc, NP        HYDROcodone-acetaminophen 5-325 mg per tablet 1 tablet  1 tablet Oral Q4H PRN Rina Blanc, NP   1 tablet at 07/20/24 0852    insulin aspart U-100 pen 0-10 Units  0-10 Units Subcutaneous QID (AC + HS) PRN HEAVENLY Perez MD        insulin aspart U-100 pen 10 Units  10 Units  Subcutaneous TIDWM HEAVENLY Perez MD   10 Units at 07/20/24 0831    insulin glargine U-100 (Lantus) pen 18 Units  18 Units Subcutaneous QHS Rina Blanc NP   18 Units at 07/19/24 2028    insulin glargine U-100 (Lantus) pen 20 Units  20 Units Subcutaneous Daily Rina Blanc, NP   20 Units at 07/20/24 0838    losartan tablet 50 mg  50 mg Oral Daily Med Fields PA-C   50 mg at 07/20/24 0832    melatonin tablet 6 mg  6 mg Oral Nightly PRN Med Fields PA-C        methocarbamoL tablet 500 mg  500 mg Oral QID Med Fields PA-C   500 mg at 07/20/24 0833    metoprolol succinate (TOPROL-XL) 24 hr tablet 100 mg  100 mg Oral Daily Med Fields PA-C   100 mg at 07/20/24 0832    morphine injection 2 mg  2 mg Intravenous Q4H PRN Rina Blanc, NP        NIFEdipine 24 hr tablet 30 mg  30 mg Oral BID Rina Blanc, NP   30 mg at 07/20/24 0833    nitroGLYCERIN SL tablet 0.4 mg  0.4 mg Sublingual Q5 Min PRN Med Fields PA-C        ondansetron disintegrating tablet 8 mg  8 mg Oral Q8H PRN Rina Blanc, NP        ondansetron injection 4 mg  4 mg Intravenous Q8H PRN Rina Blanc, NP        polyethylene glycol packet 17 g  17 g Oral BID PRN Rina Blanc, NP        pregabalin capsule 75 mg  75 mg Oral TID Med Fields PA-C   75 mg at 07/20/24 0833    senna-docusate 8.6-50 mg per tablet 1 tablet  1 tablet Oral BID Rina Blanc, NP   1 tablet at 07/20/24 0832    sertraline tablet 100 mg  100 mg Oral Daily Rina Blanc, NP   100 mg at 07/20/24 0833    sodium chloride 0.9% flush 10 mL  10 mL Intravenous Q6H Willy Ross MD   10 mL at 07/20/24 0533    And    sodium chloride 0.9% flush 10 mL  10 mL Intravenous PRN Willy Ross MD        sodium chloride 0.9% flush 10 mL  10 mL Intravenous PRN Med Fields PA-C   10 mL at 07/20/24 0003    torsemide tablet 20 mg  20 mg Oral Daily Rina Blanc, NP   20 mg at 07/20/24 0832        Physical Exam    Temp:   [97.4 °F (36.3 °C)-98.5 °F (36.9 °C)]   Pulse:  [72-88]   Resp:  [16-22]   BP: (123-166)/(58-74)   SpO2:  [95 %-97 %]    Wt Readings from Last 3 Encounters:   07/19/24 87.1 kg (192 lb 0.3 oz)   07/10/24 89.6 kg (197 lb 8.5 oz)   06/26/24 91.2 kg (201 lb 1 oz)     Physical Exam  Constitutional:       General: She is not in acute distress.     Appearance: She is obese.   Neck:      Vascular: No hepatojugular reflux or JVD.   Cardiovascular:      Rate and Rhythm: Normal rate and regular rhythm.      Heart sounds: S1 normal and S2 normal.      No S3 or S4 sounds.   Pulmonary:      Effort: Pulmonary effort is normal.      Breath sounds: Normal breath sounds and air entry.   Abdominal:      General: Bowel sounds are normal.      Palpations: Abdomen is soft. There is no hepatomegaly.      Tenderness: There is no abdominal tenderness.   Musculoskeletal:      Right lower leg: No edema.      Left lower leg: No edema.   Skin:     Coloration: Skin is not pale.   Neurological:      Mental Status: She is alert.   Psychiatric:         Behavior: Behavior is cooperative.         Telemetry  Sinus    Labs  Recent Results (from the past 24 hour(s))   POCT glucose    Collection Time: 07/19/24  9:22 AM   Result Value Ref Range    POCT Glucose 479 (HH) 70 - 110 mg/dL   Potassium    Collection Time: 07/19/24 11:39 AM   Result Value Ref Range    Potassium 5.3 (H) 3.5 - 5.1 mmol/L   POCT glucose    Collection Time: 07/19/24 12:13 PM   Result Value Ref Range    POCT Glucose 390 (H) 70 - 110 mg/dL   POCT glucose    Collection Time: 07/19/24  4:47 PM   Result Value Ref Range    POCT Glucose 453 (HH) 70 - 110 mg/dL   POCT glucose    Collection Time: 07/19/24  7:47 PM   Result Value Ref Range    POCT Glucose 473 (HH) 70 - 110 mg/dL   CBC Auto Differential    Collection Time: 07/20/24  4:52 AM   Result Value Ref Range    WBC 9.67 3.90 - 12.70 K/uL    RBC 3.64 (L) 4.00 - 5.40 M/uL    Hemoglobin 10.4 (L) 12.0 - 16.0 g/dL    Hematocrit 32.3 (L) 37.0  - 48.5 %    MCV 89 82 - 98 fL    MCH 28.6 27.0 - 31.0 pg    MCHC 32.2 32.0 - 36.0 g/dL    RDW 17.2 (H) 11.5 - 14.5 %    Platelets 235 150 - 450 K/uL    MPV 11.3 9.2 - 12.9 fL    Immature Granulocytes 0.4 0.0 - 0.5 %    Gran # (ANC) 5.0 1.8 - 7.7 K/uL    Immature Grans (Abs) 0.04 0.00 - 0.04 K/uL    Lymph # 3.3 1.0 - 4.8 K/uL    Mono # 0.7 0.3 - 1.0 K/uL    Eos # 0.5 0.0 - 0.5 K/uL    Baso # 0.06 0.00 - 0.20 K/uL    nRBC 0 0 /100 WBC    Gran % 51.5 38.0 - 73.0 %    Lymph % 34.2 18.0 - 48.0 %    Mono % 7.7 4.0 - 15.0 %    Eosinophil % 5.6 0.0 - 8.0 %    Basophil % 0.6 0.0 - 1.9 %    Differential Method Automated    Comprehensive Metabolic Panel    Collection Time: 07/20/24  4:52 AM   Result Value Ref Range    Sodium 136 136 - 145 mmol/L    Potassium 4.0 3.5 - 5.1 mmol/L    Chloride 99 95 - 110 mmol/L    CO2 24 23 - 29 mmol/L    Glucose 346 (H) 70 - 110 mg/dL    BUN 22 8 - 23 mg/dL    Creatinine 1.1 0.5 - 1.4 mg/dL    Calcium 9.4 8.7 - 10.5 mg/dL    Total Protein 6.7 6.0 - 8.4 g/dL    Albumin 3.2 (L) 3.5 - 5.2 g/dL    Total Bilirubin 0.3 0.1 - 1.0 mg/dL    Alkaline Phosphatase 82 55 - 135 U/L    AST 12 10 - 40 U/L    ALT 13 10 - 44 U/L    eGFR 51 (A) >60 mL/min/1.73 m^2    Anion Gap 13 8 - 16 mmol/L   Magnesium    Collection Time: 07/20/24  4:52 AM   Result Value Ref Range    Magnesium 1.7 1.6 - 2.6 mg/dL   POCT glucose    Collection Time: 07/20/24  7:54 AM   Result Value Ref Range    POCT Glucose 339 (H) 70 - 110 mg/dL             Jose L Méndez MD

## 2024-07-20 NOTE — PLAN OF CARE
07/20/24 0836   Medicare Message   Important Message from Medicare regarding Discharge Appeal Rights Given to patient/caregiver;Explained to patient/caregiver;Signed/date by patient/caregiver   Date IMM was signed 07/20/24   Time IMM was signed 0836

## 2024-07-20 NOTE — ASSESSMENT & PLAN NOTE
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

## 2024-07-20 NOTE — NURSING
Patient came to unit from ED via WC with personal belongings. Patient is AAOX4, no sign of acute distress or discomfort noted. Respirations even and unlabored. VS stable. Blood glucose level 473mg/dl. Patient was given 18 units of  scheduled Lantus and 3 units of insulin aspart per MD sliding scale. Tolerated medications without difficulty, Safety measures maintained, daughter at the bedside.

## 2024-07-20 NOTE — H&P
[facility]  History & Physical    Patient Name: Indira Waters  MRN: [id number]  Patient Class: IP- Inpatient  Admission Date: 7/18/2024  Attending Physician: HEAVENLY Perez MD   Primary Care Provider: Chun Zapata MD    Patient information was obtained from patient, relative(s), past medical records, and ER records.     Subjective:     Principal Problem:Acute on chronic respiratory failure with hypoxia    Chief Complaint:   Chief Complaint   Patient presents with    Chest Pain     Reports chest pain onset today after a stressful event. Pt reports welfare checks and getting her phone tapped by her daughters. Ems states the pt is not paranoid and aaox4. 2 doses of nitro PTA, pain now 7/10 compared to 10/10. Denies sob        HPI: Ms. Waters is a 79 YOF with PMHx of HFrEF (EF 40-50% 06/2024), CAD, aortic atherosclerosis with tortuous aorta, HTN, HLD, DM type IIm chronic bronchitis, CFL, former smoker, CKD, chronic anemia, history of CVA with residual left-sided deficits, lupus, fibromyalgia, chronic pain, GERD, osteoarthritis, and anxiety/depression.    She initially presented to ED via EMS on 7/18 with complaints of CP. She reported that she has had an increase in stress due to some family dynamic difficulties and was visited by APS/[employer] for welfare checks in the last couple of days which upset her. She did take SL NTG with improvement and was given repeat dose by EMS with resolution of symptoms. She denies fever, chills, recent falls, abdominal pain, N/V/D, constipation, decreased appetite, changes in PO intake, light headiness, dizziness, or headaches.     She was placed in EDOU overnight where she remained HDS and afebrile; troponin trend negative (< 0.006 > < 0.006 > 0.007 > 0.008); CXR without acute pathology; EKG with NSR and no acute dynamic changes. She was evaluated by Cardiology and pain felt to be noncardiac in origin and no further  recommendations/interventions advised. She was noted have desaturations on RA to 84-86% with recovery on 2 liters nasal cannula; she was treated with IV Lasix and DuoNebs.  6 minute walk test performed by RT with hypoxia at 86% on room air and she was upgraded to Hospital Medicine for ongoing management    The patient was admitted to the Hospital Medicine Service for further evaluation and management.     Past Medical History:   Diagnosis Date    Arthritis     Back pain     Cancer     ovarian    Cervical cancer     Coronary artery disease     Depression     Diabetes mellitus     Fibromyalgia     Heart attack     History of MI (myocardial infarction)     Hyperlipidemia     Hypertension     Lupus     Stroke     slight left sided weakness       Past Surgical History:   Procedure Laterality Date    APPENDECTOMY       SECTION      2    CORONARY ANGIOGRAPHY N/A 2022    Procedure: ANGIOGRAM, CORONARY ARTERY;  Surgeon: Jose L Méndez MD;  Location: [facility] CATH LAB;  Service: Cardiology;  Laterality: N/A;    HYSTERECTOMY      with USO for cervical cancer    INJECTION OF ANESTHETIC AGENT AROUND NERVE Bilateral 2021    Procedure: BLOCK, NERVE, SYMPATHIC;  Surgeon: Holden Pereira MD;  Location: [facility] PAIN MGT;  Service: Pain Management;  Laterality: Bilateral;    INJECTION OF ANESTHETIC AGENT AROUND NERVE N/A 2021    Procedure: BLOCK, NERVE, SYMPATHETIC  need consent;  Surgeon: Holden Pereira MD;  Location: [facility] PAIN MGT;  Service: Pain Management;  Laterality: N/A;    YARED LINK,SWALLOW FUNCTION,CINE/VIDEO RECORD  2021         TONSILLECTOMY      TRIAL OF SPINAL CORD NERVE STIMULATOR N/A 3/8/2023    Procedure: LUMBAR SPINAL CORD STIMULATOR TRIAL NEVRO REP PATIENT STATES SHE NO LONGER TAKES PLAVIX;  Surgeon: Holden Pereira MD;  Location: [facility] PAIN MGT;  Service: Pain Management;  Laterality: N/A;       Review of patient's allergies indicates:   Allergen Reactions    Bleach (sodium  hypochlorite) Shortness Of Breath    Nitrofurantoin macrocrystalline Anaphylaxis    Lipitor [atorvastatin] Diarrhea and Rash    Nsaids (non-steroidal anti-inflammatory drug)      Tolerates aspirin      Pcn [penicillins]     Statins-hmg-coa reductase inhibitors     Toradol [ketorolac]        No current facility-administered medications on file prior to encounter.     Current Outpatient Medications on File Prior to Encounter   Medication Sig    acetaminophen (TYLENOL) 500 MG tablet Take 2 tablets (1,000 mg total) by mouth every 8 (eight) hours as needed for Pain.    allopurinoL (ZYLOPRIM) 300 MG tablet Take 1 tablet (300 mg total) by mouth once daily.    aspirin (ECOTRIN) 81 MG EC tablet Take 1 tablet (81 mg total) by mouth once daily.    blood sugar diagnostic Strp To check BG 6 times daily, to use with insurance preferred meter    blood-glucose meter kit To check BG 6 times daily, to use with insurance preferred meter    HYDROcodone-acetaminophen (NORCO)  mg per tablet Take 1 tablet by mouth every 6 (six) hours as needed.    lancets (TRUEPLUS LANCETS) 30 gauge Misc Check blood sugar 4 times daily    LEVEMIR FLEXPEN 100 unit/mL (3 mL) InPn pen INJECT 20 UNITS EVERY DAY AND 18 UNITS EVERY EVENING    losartan (COZAAR) 50 MG tablet Take 1 tablet (50 mg total) by mouth once daily.    melatonin (MELATIN) 3 mg tablet Take 2 tablets (6 mg total) by mouth nightly as needed for Insomnia.    methocarbamoL (ROBAXIN) 500 MG Tab Take 500 mg by mouth 4 (four) times daily.    metoclopramide HCl (REGLAN) 5 MG tablet TAKE ONE TABLET BY MOUTH FOUR TIMES DAILY AS NEEDED FOR nausea prevention    metoprolol succinate (TOPROL-XL) 100 MG 24 hr tablet Take 1 tablet (100 mg total) by mouth once daily.    miconazole nitrate 2% (MICOTIN) 2 % Oint Apply topically 2 (two) times daily.    NIFEdipine (PROCARDIA-XL) 30 MG (OSM) 24 hr tablet Take 1 tablet (30 mg total) by mouth 2 (two) times a day.    pregabalin (LYRICA) 75 MG capsule Take 1  capsule (75 mg total) by mouth 3 (three) times daily.    senna-docusate 8.6-50 mg (PERICOLACE) 8.6-50 mg per tablet Take 1 tablet by mouth 2 (two) times daily as needed for Constipation.    sertraline (ZOLOFT) 100 MG tablet Take 1 tablet (100 mg total) by mouth once daily.    teriparatide 20 mcg/dose (620mcg/2.48mL) PnIj Inject 20 mcg into the skin once daily. Discard remainder after 28 days of use.    [DISCONTINUED] NOVOLOG FLEXPEN U-100 INSULIN 100 unit/mL (3 mL) InPn pen INJECT 14 UNITS UNDER THE SKIN 3 TIMES DAILY WITH MEALS AND MEAL CORRECTION SCALE. MAX 40 UNITS    evolocumab (REPATHA SURECLICK) 140 mg/mL PnIj Inject 1 mL (140 mg total) into the skin every 14 (fourteen) days.    torsemide (DEMADEX) 20 MG Tab Take 1 tablet (20 mg total) by mouth once daily.    [DISCONTINUED] blood-glucose transmitter (DEXCOM G6 TRANSMITTER) Nicole 1 each by Misc.(Non-Drug; Combo Route) route continuous prn.     Family History       Problem Relation (Age of Onset)    COPD Mother    Colon cancer Maternal Grandmother    Coronary artery disease Father    Diabetes Father    Hernia Mother    Lupus Mother    Ovarian cancer Mother    Uterine cancer Mother          Tobacco Use    Smoking status: Former     Current packs/day: 0.00     Types: Cigarettes     Quit date: 11/2020     Years since quitting: 3.7     Passive exposure: Past    Smokeless tobacco: Never   Substance and Sexual Activity    Alcohol use: Not Currently     Comment: occasionally    Drug use: Yes     Types: Hydrocodone     Comment: three times a day    Sexual activity: Not Currently     Review of Systems   Constitutional:  Negative for chills, diaphoresis, fatigue and fever.   Respiratory:  Negative for choking, chest tightness, shortness of breath and wheezing.    Cardiovascular:  Negative for chest pain, palpitations and leg swelling.   Gastrointestinal:  Negative for abdominal pain, constipation, diarrhea, nausea and vomiting.   Genitourinary:  Negative for difficulty  urinating and dysuria.   Neurological:  Negative for dizziness, syncope, light-headedness and headaches.     Objective:     Vital Signs (Most Recent):  Temp: 98.4 °F (36.9 °C) (07/19/24 1940)  Pulse: 78 (07/19/24 2009)  Resp: 18 (07/19/24 2028)  BP: (!) 141/63 (07/19/24 1956)  SpO2: 97 % (07/19/24 2009) Vital Signs (24h Range):  Temp:  [97.4 °F (36.3 °C)-98.5 °F (36.9 °C)] 98.4 °F (36.9 °C)  Pulse:  [76-88] 78  Resp:  [12-22] 18  SpO2:  [91 %-97 %] 97 %  BP: (136-166)/(62-74) 141/63     Weight: 87.1 kg (192 lb 0.3 oz)  Body mass index is 37.5 kg/m².     Physical Exam  Vitals and nursing note reviewed.   Constitutional:       General: She is not in acute distress.     Appearance: Normal appearance. She is well-developed. She is obese. She is not toxic-appearing.   HENT:      Head: Normocephalic and atraumatic.      Mouth/Throat:      Dentition: Normal dentition.   Eyes:      General: Lids are normal.      Extraocular Movements: Extraocular movements intact.      Conjunctiva/sclera: Conjunctivae normal.   Cardiovascular:      Rate and Rhythm: Normal rate and regular rhythm.      Heart sounds: Normal heart sounds. No murmur heard.  Pulmonary:      Effort: Pulmonary effort is normal.      Breath sounds: Normal breath sounds.   Abdominal:      General: There is no distension.      Palpations: Abdomen is soft.      Tenderness: There is no abdominal tenderness.   Musculoskeletal:      Cervical back: Normal range of motion and neck supple.      Right lower leg: No edema.      Left lower leg: No edema.   Skin:     General: Skin is warm and dry.      Findings: No erythema or rash.   Neurological:      Mental Status: She is alert and oriented to person, place, and time.             Significant Labs: All pertinent labs within the past 24 hours have been reviewed.  CBC:   Recent Labs   Lab 07/18/24  1805 07/19/24  0518   WBC 11.33 9.13   HGB 10.2* 9.9*   HCT 32.0* 31.1*    230     CMP:   Recent Labs   Lab 07/18/24  1802  07/19/24  0518 07/19/24  1139    137  --    K 3.2* 5.3* 5.3*   * 106  --    CO2 18* 24  --    * 369*  --    BUN 14 16  --    CREATININE 0.7 1.1  --    CALCIUM 6.9* 9.6  --    PROT 4.8* 6.2  --    ALBUMIN 2.3* 3.0*  --    BILITOT 0.1 0.3  --    ALKPHOS 61 78  --    AST 13 14  --    ALT 10 13  --    ANIONGAP 8 7*  --      Cardiac Markers:   Recent Labs   Lab 07/18/24  1805   *     Lipase:   Recent Labs   Lab 07/18/24  1805   LIPASE 31     Troponin:   Recent Labs   Lab 07/18/24  1947 07/18/24  2151 07/19/24  0203   TROPONINI <0.006 0.007 0.008       Significant Imaging: I have reviewed all pertinent imaging results/findings within the past 24 hours.  Assessment/Plan:     * Acute on chronic respiratory failure with hypoxia  CAFL   Chronic bronchitis   COPD suspected   Former smoker  -initially placed in EDOU for chest pain rule out with negative workup however had hypoxia on room air despite Lasix and DuoNeb dosing  -6 minute walk test performed 7/19 by RT with hypoxia at 86% on room air   -continue oxygen supplementation   -DuoNebs PRN   -resume home torsemide  -PRN supportive care is indicated  -home oxygen and home nebulizer orders placed in discharge     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 pectoris  Atherosclerotic heart disease of native coronary artery without angina  -placed in EDOU overnight where she remained HDS and afebrile  -troponin trend negative (< 0.006 > < 0.006 > 0.007 > 0.008); CXR without acute pathology; EKG with NSR and no acute dynamic changes  -evaluated by Cardiology and pain felt to be noncardiac in origin and no further recommendations/interventions advised  -continue home aspirin, losartan, metoprolol, and nifedipine  -dose/medication adjustment as appropriate   -EKG and SL NTG PRN chest pain  -continue tele monitoring  -trend labs, address/replete electrolytes as indicated    History of CVA (cerebrovascular  accident)  Hemiplegia and hemiparesis following cerebral infarction affecting left nondominant side   -chronic, history of   -continue home aspirin  -fall precautions    Essential hypertension  -chronic   -continue home losartan, metoprolol, nifedipine, and torsemide  -dose/medication adjustment as appropriate     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 disease  -chronic   -poorly controlled   -hemoglobin A1c 9.5 in June 20, 2024   -hold home lispro 10 units TID, Levemir 20 units AM and 18 units HS   -begin dose reduced basal and prandial insulin with low-dose SSI  -dose/medication adjustment as appropriate   -monitor accuchecks AC/HS and PRN hypoglycemic protocol     Stage 3 chronic kidney disease  -chronic   -SCr WNLs at admission  -avoid nephrotoxins and hypotension as able, renally dose medications  -trend labs, address/replete electrolytes as indicated    Depression, unspecified  Generalized anxiety disorder   -continue home Zoloft   -PRN supportive care is indicated    Chronic pain syndrome  Fibromyalgia   -chronic   -PRN supportive care is indicated   -fall precautions      VTE Risk Mitigation (From admission, onward)           Ordered     enoxaparin injection 40 mg  Daily         07/19/24 1906     IP VTE HIGH RISK PATIENT  Once         07/19/24 1906     Place sequential compression device  Until discontinued         07/19/24 1906                    Rina Blanc, DNP, AG-ACNP, BC  Department of Hospital Medicine  [facility]

## 2024-07-20 NOTE — DISCHARGE SUMMARY
[facility] ([facility] Medicine  Discharge Summary      Patient Name: Indira Waters  MRN: [id number]  DIONE: [id number]  Patient Class: IP- Inpatient  Admission Date: 7/18/2024  Hospital Length of Stay: 1 days  Discharge Date and Time: 7/20/2024  4:04 PM  Attending Physician: HEAVENLY Perez MD   Discharging Provider: TALAT Perez MD  Primary Care Provider: Chun Zapata MD    Primary Care Team: Networked reference to record PCT     HPI:   Ms. Waters is a 79 YOF with PMHx of HFrEF (EF 40-50% 06/2024), CAD, aortic atherosclerosis with tortuous aorta, HTN, HLD, DM type IIm chronic bronchitis, CFL, former smoker, CKD, chronic anemia, history of CVA with residual left-sided deficits, lupus, fibromyalgia, chronic pain, GERD, osteoarthritis, and anxiety/depression.    She initially presented to ED via EMS on 7/18 with complaints of CP. She reported that she has had an increase in stress due to some family dynamic difficulties and was visited by APS/[employer] for welfare checks in the last couple of days which upset her. She did take SL NTG with improvement and was given repeat dose by EMS with resolution of symptoms. She denies fever, chills, recent falls, abdominal pain, N/V/D, constipation, decreased appetite, changes in PO intake, light headiness, dizziness, or headaches.     She was placed in EDOU overnight where she remained HDS and afebrile; troponin trend negative (< 0.006 > < 0.006 > 0.007 > 0.008); CXR without acute pathology; EKG with NSR and no acute dynamic changes. She was evaluated by Cardiology and pain felt to be noncardiac in origin and no further recommendations/interventions advised. She was noted have desaturations on RA to 84-86% with recovery on 2 liters nasal cannula; she was treated with IV Lasix and DuoNebs.  6 minute walk test performed by RT with hypoxia at 86% on room air and she was upgraded to Hospital Medicine for ongoing management    The patient was admitted to the  Hospital Medicine Service for further evaluation and management.     * No surgery found *      Hospital Course:   Admitted with hypoxia in setting of CHF and COPD. 6MWT performed demonstrating need for home O2. Home O2 and nebulizer arrangements made. With clinical improvement and vital stability she was prepared for discharge home.    Goals of Care Treatment Preferences:  Code Status: Full Code    Health care agent: Esha Iglesias  [facility] agent number: [phone]                   Consults:   Consults (From admission, onward)          Status Ordering Provider     Inpatient consult to Cardiology  Once        Provider:  Jose L Méndez MD    Completed YASMIN MCKINNON     Inpatient consult to Midline team  Once        Provider:  (Not yet assigned)    Completed CAROL NG            No new Assessment & Plan notes have been filed under this hospital service since the last note was generated.  Service: Hospital Medicine    Final Active Diagnoses:    Diagnosis Date Noted POA    PRINCIPAL PROBLEM:  Acute on chronic respiratory failure with hypoxia [J96.21] 07/19/2024 Yes    Chronic bronchitis [J42] 07/19/2024 Yes    COPD (chronic obstructive pulmonary disease); SUSPECTED [J44.9] 07/19/2024 Yes    History of CVA (cerebrovascular accident) [Z86.73] 07/19/2024 Not Applicable    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 pectoris [I25.118] 05/04/2022 Yes    Depression, unspecified [F32.A] 11/10/2021 Yes    Stage 3 chronic kidney disease [N18.30] 09/15/2021 Yes    Hemiplegia and hemiparesis following cerebral infarction affecting left non-dominant side [I69.354] 09/15/2021 Not Applicable    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 disease [E11.22] 09/15/2021 Yes    Athscl heart disease of native coronary artery w/o ang pctrs [I25.10] 09/15/2021 Yes    Generalized anxiety disorder [F41.1] 09/15/2021 Yes    Essential hypertension [I10] 09/10/2021 Yes     Chronic     Former smoker [Z87.891] 06/11/2021 Not Applicable    Chronic pain syndrome [G89.4] 05/22/2014 Yes    CAFL (chronic airflow limitation) [J44.9] 04/29/2014 Yes    Fibromyalgia [M79.7] 04/29/2014 Yes     Chronic      Problems Resolved During this Admission:       Discharged Condition: good    Disposition:     Follow Up:    Patient Instructions:      OXYGEN FOR HOME USE     Order Specific Question Answer Comments   Liter Flow 2    Duration With activity    Qualifying Test Performed at: Activity    Oxygen saturation at rest 95    Oxygen saturation with activity 86    Oxygen saturation with activity on oxygen 95    Portable mode: pulse dose acceptable    Mode: Portable concentrator    Route nasal cannula    Device: home concentrator with portable concentrator    Length of need (in months): 99 mos    Patient condition with qualifying saturation COPD    Height: 5' (1.524 m)    Weight: 87.1 kg (192 lb 0.3 oz)    Alternative treatment measures have been tried or considered and deemed clinically ineffective. Yes      NEBULIZER FOR HOME USE     Order Specific Question Answer Comments   Height: 5' (1.524 m)    Weight: 87.1 kg (192 lb 0.3 oz)    Does patient have medical equipment at home? cane, straight    Length of need (1-99 months): 99      NEBULIZER KIT (SUPPLIES) FOR HOME USE     Order Specific Question Answer Comments   Height: 5' (1.524 m)    Weight: 87.1 kg (192 lb 0.3 oz)    Does patient have medical equipment at home? cane, straight    Length of need (1-99 months): 99    Mask or Mouthpiece? Mouthpiece        Significant Diagnostic Studies:   CBC:  Recent Labs   Lab 07/18/24 1805 07/19/24 0518 07/20/24  0452   WBC 11.33 9.13 9.67   HGB 10.2* 9.9* 10.4*   HCT 32.0* 31.1* 32.3*    230 235   GRAN 60.8  6.9 55.4  5.1 51.5  5.0   LYMPH 25.8  2.9 30.1  2.8 34.2  3.3   MONO 7.1  0.8 7.6  0.7 7.7  0.7   EOS 0.6* 0.6* 0.5   BASO 0.04 0.04 0.06   CMP:  Recent Labs   Lab 07/18/24 1805 07/18/24 1947  07/19/24  0518 07/19/24  1139 07/20/24  0452     --  137  --  136   K 3.2*  --  5.3* 5.3* 4.0   *  --  106  --  99   CO2 18*  --  24  --  24   BUN 14  --  16  --  22   CREATININE 0.7  --  1.1  --  1.1   *  --  369*  --  346*   CALCIUM 6.9*  --  9.6  --  9.4   MG  --  1.8  --   --  1.7   PHOS  --  4.9*  --   --   --    ALKPHOS 61  --  78  --  82   AST 13  --  14  --  12   ALT 10  --  13  --  13   BILITOT 0.1  --  0.3  --  0.3   PROT 4.8*  --  6.2  --  6.7   ALBUMIN 2.3*  --  3.0*  --  3.2*   ANIONGAP 8  --  7*  --  13        Pending Diagnostic Studies:       None           Medications:  Reconciled Home Medications:      Medication List        START taking these medications      albuterol 1.25 mg/3 mL Nebu  Commonly known as: ACCUNEB  Take 3 mLs (1.25 mg total) by nebulization every 6 (six) hours as needed (for wheezing or shortness of breath). Rescue     insulin lispro 100 unit/mL pen  Commonly known as: HumaLOG KwikPen Insulin  Inject 10 Units into the skin 3 (three) times daily before meals.            CONTINUE taking these medications      acetaminophen 500 MG tablet  Commonly known as: TYLENOL  Take 2 tablets (1,000 mg total) by mouth every 8 (eight) hours as needed for Pain.     allopurinoL 300 MG tablet  Commonly known as: ZYLOPRIM  Take 1 tablet (300 mg total) by mouth once daily.     aspirin 81 MG EC tablet  Commonly known as: ECOTRIN  Take 1 tablet (81 mg total) by mouth once daily.     blood sugar diagnostic Strp  To check BG 6 times daily, to use with insurance preferred meter     blood-glucose meter kit  To check BG 6 times daily, to use with insurance preferred meter     HYDROcodone-acetaminophen  mg per tablet  Commonly known as: NORCO  Take 1 tablet by mouth every 6 (six) hours as needed.     lancets 30 gauge Misc  Commonly known as: TRUEPLUS LANCETS  Check blood sugar 4 times daily     LEVEMIR FLEXPEN 100 unit/mL (3 mL) Inpn pen  Generic drug: insulin detemir U-100  (Levemir)  INJECT 20 UNITS EVERY DAY AND 18 UNITS EVERY EVENING     losartan 50 MG tablet  Commonly known as: COZAAR  Take 1 tablet (50 mg total) by mouth once daily.     melatonin 3 mg tablet  Commonly known as: MELATIN  Take 2 tablets (6 mg total) by mouth nightly as needed for Insomnia.     methocarbamoL 500 MG Tab  Commonly known as: ROBAXIN  Take 500 mg by mouth 4 (four) times daily.     metoclopramide HCl 5 MG tablet  Commonly known as: REGLAN  TAKE ONE TABLET BY MOUTH FOUR TIMES DAILY AS NEEDED FOR nausea prevention     metoprolol succinate 100 MG 24 hr tablet  Commonly known as: TOPROL-XL  Take 1 tablet (100 mg total) by mouth once daily.     miconazole nitrate 2% 2 % Oint  Commonly known as: MICOTIN  Apply topically 2 (two) times daily.     NIFEdipine 30 MG (OSM) 24 hr tablet  Commonly known as: PROCARDIA-XL  Take 1 tablet (30 mg total) by mouth 2 (two) times a day.     pregabalin 75 MG capsule  Commonly known as: LYRICA  Take 1 capsule (75 mg total) by mouth 3 (three) times daily.     REPATHA SURECLICK 140 mg/mL Pnij  Generic drug: evolocumab  Inject 1 mL (140 mg total) into the skin every 14 (fourteen) days.     sertraline 100 MG tablet  Commonly known as: ZOLOFT  Take 1 tablet (100 mg total) by mouth once daily.     STOOL SOFTENER-STIMULANT LAXAT 8.6-50 mg per tablet  Generic drug: senna-docusate 8.6-50 mg  Take 1 tablet by mouth 2 (two) times daily as needed for Constipation.     teriparatide 20 mcg/dose (620mcg/2.48mL) Pnij  Inject 20 mcg into the skin once daily. Discard remainder after 28 days of use.     torsemide 20 MG Tab  Commonly known as: DEMADEX  Take 1 tablet (20 mg total) by mouth once daily.              Indwelling Lines/Drains at time of discharge:   Lines/Drains/Airways       Drain  Duration             Female External Urinary Catheter w/ Suction 07/18/24 2130 1 day                    Time spent on the discharge of patient: 35 minutes         TALAT Perez MD  Department of Hospital  [facility] ([place])

## 2024-07-20 NOTE — PROGRESS NOTES
AVS virtually reviewed with patient and daughter in its entirety with emphasis on diet, medications, follow-up appointments and reasons to return to the ED or contact the [facility] On Call Nurse Care Line. Patient and daughtr also encouraged to utilize their patient portal. Ease and convenience of use reiterated. Education complete and patient voiced understanding. All questions answered. Discharge teaching complete.

## 2024-07-20 NOTE — PLAN OF CARE
POC reviewed.Complaints of pain treated with PRN pain medication according to MAR. No significant events this shift. Cardiac monitor maintained. 2 LPM O2 NC noted.  Purewick utilized for voiding,shifts in bed independently. Bed low and locked, side rails up x3, call light within reach.            \  Problem: Adult Inpatient Plan of Care  Goal: Plan of Care Review  Outcome: Progressing  Goal: Patient-Specific Goal (Individualized)  Outcome: Progressing  Goal: Absence of Hospital-Acquired Illness or Injury  Outcome: Progressing  Goal: Optimal Comfort and Wellbeing  Outcome: Progressing  Goal: Readiness for Transition of Care  Outcome: Progressing     Problem: Diabetes Comorbidity  Goal: Blood Glucose Level Within Targeted Range  Outcome: Progressing     Problem: Infection  Goal: Absence of Infection Signs and Symptoms  Outcome: Progressing

## 2024-07-20 NOTE — ASSESSMENT & PLAN NOTE
-chronic   -SCr WNLs at admission  -avoid nephrotoxins and hypotension as able, renally dose medications  -trend labs, address/replete electrolytes as indicated

## 2024-07-20 NOTE — ASSESSMENT & PLAN NOTE
-chronic   -poorly controlled   -hemoglobin A1c 9.5 in June 20, 2024   -hold home lispro 10 units TID, Levemir 20 units AM and 18 units HS   -begin dose reduced basal and prandial insulin with low-dose SSI  -dose/medication adjustment as appropriate   -monitor accuchecks AC/HS and PRN hypoglycemic protocol

## 2024-07-20 NOTE — NURSING
Midline removed, catheter tip intact. Telemetry discontinued. Patient will need a lyft ride to her home. Waiting for nebulization meds at the moment.

## 2024-07-23 ENCOUNTER — PATIENT OUTREACH (OUTPATIENT)
Dept: ADMINISTRATIVE | Facility: CLINIC | Age: 79
End: 2024-07-23
Payer: MEDICARE

## 2024-07-23 NOTE — PROGRESS NOTES
C3 nurse attempted to contact Indira Waters  for a TCC post hospital discharge follow up call. No answer. The patient has a scheduled HOSFU appointment with Chun Zapata MD  on 7/25/24 @ 1030.      Message sent via [facility] portal for Post Discharge Attempt.

## 2024-07-24 NOTE — PROGRESS NOTES
C3 nurse spoke with Indira Waters  for a TCC post hospital discharge follow up call. The patient has a scheduled HOS appointment with Chun Zapata MD  on 7/25/24 @ 1030.

## 2024-07-25 ENCOUNTER — TELEPHONE (OUTPATIENT)
Dept: INTERNAL MEDICINE | Facility: CLINIC | Age: 79
End: 2024-07-25

## 2024-07-25 ENCOUNTER — OFFICE VISIT (OUTPATIENT)
Dept: INTERNAL MEDICINE | Facility: CLINIC | Age: 79
End: 2024-07-25
Payer: MEDICARE

## 2024-07-25 ENCOUNTER — TELEPHONE (OUTPATIENT)
Dept: ENDOCRINOLOGY | Facility: CLINIC | Age: 79
End: 2024-07-25
Payer: MEDICARE

## 2024-07-25 ENCOUNTER — LAB VISIT (OUTPATIENT)
Dept: LAB | Facility: OTHER | Age: 79
End: 2024-07-25
Attending: STUDENT IN AN ORGANIZED HEALTH CARE EDUCATION/TRAINING PROGRAM
Payer: MEDICARE

## 2024-07-25 VITALS
HEART RATE: 86 BPM | WEIGHT: 197.75 LBS | SYSTOLIC BLOOD PRESSURE: 127 MMHG | BODY MASS INDEX: 38.82 KG/M2 | OXYGEN SATURATION: 95 % | DIASTOLIC BLOOD PRESSURE: 61 MMHG | HEIGHT: 60 IN

## 2024-07-25 DIAGNOSIS — J44.9 CHRONIC OBSTRUCTIVE PULMONARY DISEASE, UNSPECIFIED COPD TYPE: ICD-10-CM

## 2024-07-25 DIAGNOSIS — J44.9 CAFL (CHRONIC AIRFLOW LIMITATION): ICD-10-CM

## 2024-07-25 DIAGNOSIS — E11.22 TYPE 2 DIABETES MELLITUS WITH CHRONIC KIDNEY DISEASE, WITH LONG-TERM CURRENT USE OF INSULIN, UNSPECIFIED CKD STAGE: ICD-10-CM

## 2024-07-25 DIAGNOSIS — R91.8 LUNG MASS: ICD-10-CM

## 2024-07-25 DIAGNOSIS — N18.4 STAGE 4 CHRONIC KIDNEY DISEASE: Primary | ICD-10-CM

## 2024-07-25 DIAGNOSIS — I25.10 ATHSCL HEART DISEASE OF NATIVE CORONARY ARTERY W/O ANG PCTRS: ICD-10-CM

## 2024-07-25 DIAGNOSIS — I10 ESSENTIAL HYPERTENSION: Chronic | ICD-10-CM

## 2024-07-25 DIAGNOSIS — J41.1 MUCOPURULENT CHRONIC BRONCHITIS: ICD-10-CM

## 2024-07-25 DIAGNOSIS — M54.15 RADICULOPATHY OF THORACOLUMBAR REGION: ICD-10-CM

## 2024-07-25 DIAGNOSIS — R53.83 FATIGUE, UNSPECIFIED TYPE: ICD-10-CM

## 2024-07-25 DIAGNOSIS — E87.70 HYPERVOLEMIA, UNSPECIFIED HYPERVOLEMIA TYPE: ICD-10-CM

## 2024-07-25 DIAGNOSIS — J96.01 ACUTE RESPIRATORY FAILURE WITH HYPOXIA AND HYPERCAPNIA: ICD-10-CM

## 2024-07-25 DIAGNOSIS — I69.354 HEMIPLEGIA AND HEMIPARESIS FOLLOWING CEREBRAL INFARCTION AFFECTING LEFT NON-DOMINANT SIDE: ICD-10-CM

## 2024-07-25 DIAGNOSIS — D69.2 SENILE PURPURA: ICD-10-CM

## 2024-07-25 DIAGNOSIS — T45.8X5S ADVERSE EFFECT OF BISPHOSPHONATE, SEQUELA: ICD-10-CM

## 2024-07-25 DIAGNOSIS — Z86.73 HISTORY OF CVA (CEREBROVASCULAR ACCIDENT): ICD-10-CM

## 2024-07-25 DIAGNOSIS — E78.01 FAMILIAL HYPERCHOLESTEROLEMIA: Chronic | ICD-10-CM

## 2024-07-25 DIAGNOSIS — Z79.4 TYPE 2 DIABETES MELLITUS WITH CHRONIC KIDNEY DISEASE, WITH LONG-TERM CURRENT USE OF INSULIN, UNSPECIFIED CKD STAGE: ICD-10-CM

## 2024-07-25 DIAGNOSIS — Z91.199 NONCOMPLIANCE: ICD-10-CM

## 2024-07-25 DIAGNOSIS — F33.40 RECURRENT MAJOR DEPRESSIVE DISORDER, IN REMISSION: ICD-10-CM

## 2024-07-25 DIAGNOSIS — I25.84 CORONARY ARTERY CALCIFICATION: Chronic | ICD-10-CM

## 2024-07-25 DIAGNOSIS — I25.10 CORONARY ARTERY CALCIFICATION: Chronic | ICD-10-CM

## 2024-07-25 DIAGNOSIS — J96.02 ACUTE RESPIRATORY FAILURE WITH HYPOXIA AND HYPERCAPNIA: ICD-10-CM

## 2024-07-25 DIAGNOSIS — I50.43 ACUTE ON CHRONIC COMBINED SYSTOLIC AND DIASTOLIC CONGESTIVE HEART FAILURE: ICD-10-CM

## 2024-07-25 DIAGNOSIS — J42 CHRONIC BRONCHITIS, UNSPECIFIED CHRONIC BRONCHITIS TYPE: ICD-10-CM

## 2024-07-25 DIAGNOSIS — J96.21 ACUTE ON CHRONIC RESPIRATORY FAILURE WITH HYPOXIA: ICD-10-CM

## 2024-07-25 DIAGNOSIS — N18.4 STAGE 4 CHRONIC KIDNEY DISEASE: ICD-10-CM

## 2024-07-25 LAB
25(OH)D3+25(OH)D2 SERPL-MCNC: 52 NG/ML (ref 30–96)
ALBUMIN SERPL BCP-MCNC: 3.2 G/DL (ref 3.5–5.2)
ALP SERPL-CCNC: 92 U/L (ref 55–135)
ALT SERPL W/O P-5'-P-CCNC: 15 U/L (ref 10–44)
ANION GAP SERPL CALC-SCNC: 12 MMOL/L (ref 8–16)
AST SERPL-CCNC: 17 U/L (ref 10–40)
BILIRUB SERPL-MCNC: 0.2 MG/DL (ref 0.1–1)
BNP SERPL-MCNC: 250 PG/ML (ref 0–99)
BUN SERPL-MCNC: 20 MG/DL (ref 8–23)
CALCIUM SERPL-MCNC: 9 MG/DL (ref 8.7–10.5)
CHLORIDE SERPL-SCNC: 105 MMOL/L (ref 95–110)
CHOLEST SERPL-MCNC: 236 MG/DL (ref 120–199)
CHOLEST/HDLC SERPL: 5.9 {RATIO} (ref 2–5)
CO2 SERPL-SCNC: 22 MMOL/L (ref 23–29)
CREAT SERPL-MCNC: 1.1 MG/DL (ref 0.5–1.4)
ERYTHROCYTE [DISTWIDTH] IN BLOOD BY AUTOMATED COUNT: 17.2 % (ref 11.5–14.5)
EST. GFR  (NO RACE VARIABLE): 51 ML/MIN/1.73 M^2
ESTIMATED AVG GLUCOSE: 189 MG/DL (ref 68–131)
GLUCOSE SERPL-MCNC: 186 MG/DL (ref 70–110)
HBA1C MFR BLD: 8.2 % (ref 4–5.6)
HCT VFR BLD AUTO: 31.9 % (ref 37–48.5)
HDLC SERPL-MCNC: 40 MG/DL (ref 40–75)
HDLC SERPL: 16.9 % (ref 20–50)
HGB BLD-MCNC: 9.9 G/DL (ref 12–16)
LDLC SERPL CALC-MCNC: 119.8 MG/DL (ref 63–159)
MCH RBC QN AUTO: 28.2 PG (ref 27–31)
MCHC RBC AUTO-ENTMCNC: 31 G/DL (ref 32–36)
MCV RBC AUTO: 91 FL (ref 82–98)
NONHDLC SERPL-MCNC: 196 MG/DL
PLATELET # BLD AUTO: 240 K/UL (ref 150–450)
PMV BLD AUTO: 11.3 FL (ref 9.2–12.9)
POTASSIUM SERPL-SCNC: 4 MMOL/L (ref 3.5–5.1)
PROT SERPL-MCNC: 6.8 G/DL (ref 6–8.4)
RBC # BLD AUTO: 3.51 M/UL (ref 4–5.4)
SODIUM SERPL-SCNC: 139 MMOL/L (ref 136–145)
TRIGL SERPL-MCNC: 381 MG/DL (ref 30–150)
WBC # BLD AUTO: 11.07 K/UL (ref 3.9–12.7)

## 2024-07-25 PROCEDURE — 80053 COMPREHEN METABOLIC PANEL: CPT | Performed by: STUDENT IN AN ORGANIZED HEALTH CARE EDUCATION/TRAINING PROGRAM

## 2024-07-25 PROCEDURE — 83880 ASSAY OF NATRIURETIC PEPTIDE: CPT | Performed by: STUDENT IN AN ORGANIZED HEALTH CARE EDUCATION/TRAINING PROGRAM

## 2024-07-25 PROCEDURE — 85027 COMPLETE CBC AUTOMATED: CPT | Performed by: STUDENT IN AN ORGANIZED HEALTH CARE EDUCATION/TRAINING PROGRAM

## 2024-07-25 PROCEDURE — 36415 COLL VENOUS BLD VENIPUNCTURE: CPT | Performed by: STUDENT IN AN ORGANIZED HEALTH CARE EDUCATION/TRAINING PROGRAM

## 2024-07-25 PROCEDURE — 83036 HEMOGLOBIN GLYCOSYLATED A1C: CPT | Performed by: STUDENT IN AN ORGANIZED HEALTH CARE EDUCATION/TRAINING PROGRAM

## 2024-07-25 PROCEDURE — 80061 LIPID PANEL: CPT | Performed by: STUDENT IN AN ORGANIZED HEALTH CARE EDUCATION/TRAINING PROGRAM

## 2024-07-25 PROCEDURE — 82306 VITAMIN D 25 HYDROXY: CPT | Performed by: STUDENT IN AN ORGANIZED HEALTH CARE EDUCATION/TRAINING PROGRAM

## 2024-07-25 PROCEDURE — 99215 OFFICE O/P EST HI 40 MIN: CPT | Mod: PBBFAC | Performed by: STUDENT IN AN ORGANIZED HEALTH CARE EDUCATION/TRAINING PROGRAM

## 2024-07-25 PROCEDURE — 99999 PR PBB SHADOW E&M-EST. PATIENT-LVL V: CPT | Mod: PBBFAC,,, | Performed by: STUDENT IN AN ORGANIZED HEALTH CARE EDUCATION/TRAINING PROGRAM

## 2024-07-25 NOTE — TELEPHONE ENCOUNTER
"Called additional number listed as "work phone" in chart. Answered by person self identifying as patient's "other daughter" but stated she was "taking a nap". Speech sounded very groggy. Ended call as this did not seem safe for patient/patient's info.     Since a previous attempt was made calling the only other listed number, I am re-sending Dr. Zapata's orders via portal and mailing them out today, 07/25/24.     "     Maxime Isbell MA   Medical Assistant     Telephone Encounter  Signed     Encounter Date: 7/25/2024            Attempted to contact patient daughter to inform her of Dr. Zapata message below but no answer and VM is not set up.          ----- Message from Chun Zapata MD sent at 7/25/2024  1:07 PM CDT -----  Good afternoon,     Please take extra dose of torsemide today and tomorrow (or tomorrow and Saturday) due to fluid overload in legs, extra weight, and due to BNP being elevated.  Then resume regular dosing schedule of once a day.     Ok to restart forteo and repatha     Please contact us with questions.  Sincerely,  Chun Zapata MD         "  "

## 2024-07-25 NOTE — TELEPHONE ENCOUNTER
Attempted to contact patient daughter to inform her of Dr. Zapata message below but no answer and VM is not set up.

## 2024-07-25 NOTE — PROGRESS NOTES
[facility]    Subjective:     History was obtained from the patient and supplemented through chart review.  This pt is known to me.    Transitional Care Note    Family and/or Caretaker present at visit?  Yes.  Diagnostic tests reviewed/disposition: I have reviewed all completed as well as pending diagnostic tests at the time of discharge.  Disease/illness education: yes  Home health/community services discussion/referrals: Patient has home health established at [facility], but needs new order .   Establishment or re-establishment of referral orders for community resources: No other necessary community resources.   Discussion with other health care providers: No discussion with other health care providers necessary.         Patient ID: Indira Waters is a 79 y.o. female.    Chief Complaint: Hospital Follow Up      HPI:    Patient is a 79 y.o. female w/ pmhx including DM, HTN, HLD, CAD, fibromyalgia presents for hospital discharge for f/u with recent admission for SOB thought to be 2/2 copd exacerbation    Recent admission 7/18-7/20/2024  Increased family stress, new oxygen requirement for activity  Generally feeling well now, needs portable oxygen concentrator    Hospital f/u for recent COPD exacerbation with new oxygen requirement, ordered portable concentrator    Question Answer Comment   Liter Flow 2    Duration With activity    Qualifying Test Performed at: Activity    Oxygen saturation at rest 95    Oxygen saturation with activity 86    Oxygen saturation with activity on oxygen 95    Portable mode: continuous    Route nasal cannula    Device: home concentrator with portable tanks    Length of need (in months): 99 mos    Patient condition with qualifying saturation CHF    Additional information: documented pulmonary hypertension PASP 45   Height: 5' (1.524 m)    Weight: 87.1 kg (192 lb 0.3 oz)    Alternative treatment measures have been tried or considered and deemed clinically ineffective.  Yes      Has not taken forteo nor repatha since being in hospital   Will restart repatha  Will restart forteo after ensuring Vit D and calcium normal    DM  9.5 6/5/2024, repeat today  Reviewed glucoses, check A1C    Hx of     Severely deconditioned  Exercise encouraged, movement difficult, encouraged, using exercise trampoline sometimes when feeling well    CAD\CHF HFpEF  Followed by Dr. Méndez  Cardiac Cath 5/6/2022 with significant blockages   Taking ASA 81, plavix  Not taking statin due to vasculitis thought to be 2/2 statin; on repatha   Nitro prn  Return to cardiology, has been seen in hospital m,ultiple times  Torsemide taking  Repeat BNP    Normocytic Anemia  Family history of colon cancer in grandmother  Have attempted multiple times to get colonoscopy, but pt reticent.  Will continue to educate.    Fibromyalgia   Chronic Pain Syndrome worse congratulations     Osteoporosis  Did not tolerate bisphosphonate in the past  AE dicussed, On forteo with endocrinology  Awaiting calcium and Vit D    HTN  Procardia 30 BID, losartan 50 mg daily?, clonidine 0.1 mg BID, toprol 100 mg BID    CHF   Grade II DD  Lasix 40 mg daily  Doing well    Hx of Drug induced Vasculitis  Thought to be 2/2 to atorvastatin, switched to repatha q 14 days    CKD Stage 3b/4  Last seen nephro Dec 2022,pt has failed to f/u prior insisted on arranging own appts, we will assist.  Avoid nsaids, nephrotoxic meds  Also with constipation    Depression  zoloft 100 mg daily   Stable though not not feeling well    History of cervical cancer at about age 31, never a problem later in life  S/p hyst with 1 ovary remaining  Saw urogyn 1/2023    Wt Readings from Last 15 Encounters:   07/25/24 89.7 kg (197 lb 12 oz)   07/19/24 87.1 kg (192 lb 0.3 oz)   07/10/24 89.6 kg (197 lb 8.5 oz)   06/26/24 91.2 kg (201 lb 1 oz)   05/22/24 89.8 kg (198 lb)   05/15/24 89.9 kg (198 lb 3.2 oz)   05/10/24 84 kg (185 lb 3 oz)   04/18/24 84 kg (185 lb 3 oz)   03/14/24 84.6 kg  (186 lb 8.2 oz)   24 84.6 kg (186 lb 9.6 oz)   24 90 kg (198 lb 6.6 oz)   23 88.5 kg (195 lb)   12/15/23 88.9 kg (195 lb 15.8 oz)   23 88.5 kg (195 lb 1.7 oz)   11/15/23 88.5 kg (195 lb)      Son and   on mothers' day    Lupus? Pt reports  STEVE normal 2022  Never treated, not confirmed in our system  STEVE normal 2022  C3 and C4 normal       Medical History  Past Medical History:   Diagnosis Date    Arthritis     Back pain     Cancer     ovarian    Cervical cancer     Coronary artery disease     Depression     Diabetes mellitus     Fibromyalgia     Heart attack     History of MI (myocardial infarction)     Hyperlipidemia     Hypertension     Lupus     Stroke     slight left sided weakness       Review of Systems   Constitutional:  Negative for fever.   HENT:  Negative for trouble swallowing.    Respiratory:  Negative for shortness of breath.    Cardiovascular:  Negative for chest pain.   Gastrointestinal:  Negative for constipation, diarrhea, nausea and vomiting.   Musculoskeletal:  Negative for gait problem.   Skin:         bruising   Neurological:  Negative for dizziness and seizures.   Psychiatric/Behavioral:  Negative for hallucinations.          Surgical hx, family hx, social hx   Have been reviewed      Current Outpatient Medications:     acetaminophen (TYLENOL) 500 MG tablet, Take 2 tablets (1,000 mg total) by mouth every 8 (eight) hours as needed for Pain., Disp: , Rfl: 0    albuterol (ACCUNEB) 1.25 mg/3 mL Nebu, Take 3 mLs (1.25 mg total) by nebulization every 6 (six) hours as needed (for wheezing or shortness of breath). Rescue, Disp: 75 mL, Rfl: 0    allopurinoL (ZYLOPRIM) 300 MG tablet, Take 1 tablet (300 mg total) by mouth once daily., Disp: 90 tablet, Rfl: 3    aspirin (ECOTRIN) 81 MG EC tablet, Take 1 tablet (81 mg total) by mouth once daily., Disp: 30 tablet, Rfl: 0    blood sugar diagnostic Strp, To check BG 6 times daily, to use with insurance  preferred meter, Disp: 200 each, Rfl: 11    blood-glucose meter kit, To check BG 6 times daily, to use with insurance preferred meter, Disp: 1 each, Rfl: 0    HYDROcodone-acetaminophen (NORCO)  mg per tablet, Take 1 tablet by mouth every 6 (six) hours as needed., Disp: , Rfl:     insulin lispro (HUMALOG KWIKPEN INSULIN) 100 unit/mL pen, Inject 10 Units into the skin 3 (three) times daily before meals., Disp: 27 mL, Rfl: 3    lancets (TRUEPLUS LANCETS) 30 gauge Misc, Check blood sugar 4 times daily, Disp: 400 each, Rfl: 2    LEVEMIR FLEXPEN 100 unit/mL (3 mL) InPn pen, INJECT 20 UNITS EVERY DAY AND 18 UNITS EVERY EVENING, Disp: 30 mL, Rfl: 3    losartan (COZAAR) 50 MG tablet, Take 1 tablet (50 mg total) by mouth once daily., Disp: 90 tablet, Rfl: 3    melatonin (MELATIN) 3 mg tablet, Take 2 tablets (6 mg total) by mouth nightly as needed for Insomnia., Disp: , Rfl:     methocarbamoL (ROBAXIN) 500 MG Tab, Take 500 mg by mouth 4 (four) times daily., Disp: , Rfl:     metoclopramide HCl (REGLAN) 5 MG tablet, TAKE ONE TABLET BY MOUTH FOUR TIMES DAILY AS NEEDED FOR nausea prevention, Disp: 30 tablet, Rfl: 3    metoprolol succinate (TOPROL-XL) 100 MG 24 hr tablet, Take 1 tablet (100 mg total) by mouth once daily., Disp: 90 tablet, Rfl: 3    miconazole nitrate 2% (MICOTIN) 2 % Oint, Apply topically 2 (two) times daily., Disp: , Rfl:     NIFEdipine (PROCARDIA-XL) 30 MG (OSM) 24 hr tablet, Take 1 tablet (30 mg total) by mouth 2 (two) times a day., Disp: 180 tablet, Rfl: 3    pregabalin (LYRICA) 75 MG capsule, Take 1 capsule (75 mg total) by mouth 3 (three) times daily., Disp: , Rfl:     senna-docusate 8.6-50 mg (PERICOLACE) 8.6-50 mg per tablet, Take 1 tablet by mouth 2 (two) times daily as needed for Constipation., Disp: 60 tablet, Rfl: 0    sertraline (ZOLOFT) 100 MG tablet, Take 1 tablet (100 mg total) by mouth once daily., Disp: 90 tablet, Rfl: 3    torsemide (DEMADEX) 20 MG Tab, Take 1 tablet (20 mg total) by mouth  once daily., Disp: 90 tablet, Rfl: 3    evolocumab (REPATHA SURECLICK) 140 mg/mL PnIj, Inject 1 mL (140 mg total) into the skin every 14 (fourteen) days. (Patient not taking: Reported on 7/24/2024), Disp: 2 each, Rfl: 11    teriparatide 20 mcg/dose (620mcg/2.48mL) PnIj, Inject 20 mcg into the skin once daily. Discard remainder after 28 days of use. (Patient not taking: Reported on 7/24/2024), Disp: 2.48 mL, Rfl: 2    Objective:        Body mass index is 38.62 kg/m².  Vitals:    07/25/24 1025   BP: 127/61   Pulse: 86   SpO2: 95%   Weight: 89.7 kg (197 lb 12 oz)   Height: 5' (1.524 m)   PainSc:   8   PainLoc: Generalized     Physical Exam  Vitals and nursing note reviewed.   Constitutional:       General: She is not in acute distress.     Appearance: She is not ill-appearing.      Comments: Frail, cane   HENT:      Head: Normocephalic and atraumatic.   Eyes:      General: No scleral icterus.  Cardiovascular:      Rate and Rhythm: Normal rate and regular rhythm.   Pulmonary:      Effort: Pulmonary effort is normal.   Musculoskeletal:         General: No deformity.      Right lower leg: Edema present.      Left lower leg: Edema present.      Comments: 3+ PE bilat legs   Skin:     Comments: Senile purpura, echymoses   Neurological:      Mental Status: She is alert and oriented to person, place, and time.   Psychiatric:         Behavior: Behavior normal.           Lab Results   Component Value Date    WBC 11.07 07/25/2024    HGB 9.9 (L) 07/25/2024    HCT 31.9 (L) 07/25/2024     07/25/2024    CHOL 197 06/05/2024    TRIG 348 (H) 06/05/2024    HDL 35 (L) 06/05/2024    ALT 15 07/25/2024    AST 17 07/25/2024     07/25/2024    K 4.0 07/25/2024     07/25/2024    CREATININE 1.1 07/25/2024    BUN 20 07/25/2024    CO2 22 (L) 07/25/2024    TSH 2.059 06/05/2024    INR 1.0 06/21/2024    HGBA1C 9.5 (H) 06/05/2024       The ASCVD Risk score (Annamarie WEATHERS, et al., 2019) failed to calculate for the following reasons:     The patient has a prior MI or stroke diagnosis    (Imaging have been independently reviewed)   7/18/2024  CH[facility] imaging xray    Assessment:         1. Stage 4 chronic kidney disease    2. History of CVA (cerebrovascular accident)    3. Radiculopathy of thoracolumbar region    4. Type 2 diabetes mellitus with chronic kidney disease, with long-term current use of insulin, unspecified CKD stage    5. Chronic obstructive pulmonary disease, unspecified COPD type    6. Mucopurulent chronic bronchitis    7. Hypervolemia, unspecified hypervolemia type    8. Fatigue, unspecified type    9. Hemiplegia and hemiparesis following cerebral infarction affecting left non-dominant side    10. Recurrent major depressive disorder, in remission    11. Acute respiratory failure with hypoxia and hypercapnia    12. Acute on chronic respiratory failure with hypoxia    13. CAFL (chronic airflow limitation)    14. Chronic bronchitis, unspecified chronic bronchitis type    15. Lung mass    16. Acute on chronic combined systolic and diastolic congestive heart failure    17. Athscl heart disease of native coronary artery w/o ang pctrs    18. Essential hypertension    19. Familial hypercholesterolemia    20. Senile purpura    21. Coronary artery calcification    22. Noncompliance    23. Adverse effect of bisphosphonate, sequela          Plan:     Indira was seen today for hospital follow up.    Diagnoses and all orders for this visit:    Stage 4 chronic kidney disease  -     Ambulatory referral/consult to Home Health; Future  -     CBC Without Differential; Future  -     Comprehensive Metabolic Panel; Future  -     Vitamin D; Future  -     Lipid Panel; Future    History of CVA (cerebrovascular accident)  -     Ambulatory referral/consult to Home Health; Future  -     Ambulatory referral/consult to Sleep Disorders; Future    Radiculopathy of thoracolumbar region  -     Ambulatory referral/consult to Home Health; Future    Type 2 diabetes mellitus  with chronic kidney disease, with long-term current use of insulin, unspecified CKD stage  -     Hemoglobin A1C; Future    Chronic obstructive pulmonary disease, unspecified COPD type  -     OXYGEN FOR HOME USE    Mucopurulent chronic bronchitis  -     OXYGEN FOR HOME USE    Hypervolemia, unspecified hypervolemia type  -     B-TYPE NATRIURETIC PEPTIDE; Future    Fatigue, unspecified type  -     Ambulatory referral/consult to Sleep Disorders; Future    Hemiplegia and hemiparesis following cerebral infarction affecting left non-dominant side    Recurrent major depressive disorder, in remission    Acute respiratory failure with hypoxia and hypercapnia    Acute on chronic respiratory failure with hypoxia    CAFL (chronic airflow limitation)    Chronic bronchitis, unspecified chronic bronchitis type    Lung mass    Acute on chronic combined systolic and diastolic congestive heart failure    Athscl heart disease of native coronary artery w/o ang pctrs    Essential hypertension    Familial hypercholesterolemia    Senile purpura    Coronary artery calcification    Noncompliance    Adverse effect of bisphosphonate, sequela        Follow up in about 1 month (around 8/25/2024).        All medications were reviewed including potential side effects and risks/benefits.  Pt was counseled to call back if anything worsens or if questions arise.    Chun Zapata MD  Family Medicine  [facility]  [facility]  [address]  [place] [address]  Phone [phone]  Fax [phone]

## 2024-07-25 NOTE — TELEPHONE ENCOUNTER
----- Message from Chun Zapata MD sent at 7/25/2024  1:07 PM CDT -----  Good afternoon,    Please take extra dose of torsemide today and tomorrow (or tomorrow and Saturday) due to fluid overload in legs, extra weight, and due to BNP being elevated.  Then resume regular dosing schedule of once a day.    Ok to restart forteo and repatha    Please contact us with questions.  Sincerely,  Chun aZpata MD     PT ATTEMPTING TO LEAVE PER DR TAFOYA PT IS NOT COMPITENT TO LEAVE AMA AND IS 
TO BE RESTRAINED DUE TO INABILITY TO FOLLOW SAFETY INSTRUCTIONS IF HE ATTEMPTS 
TO LEAVE.

## 2024-07-26 DIAGNOSIS — G89.4 CHRONIC PAIN SYNDROME: ICD-10-CM

## 2024-07-26 DIAGNOSIS — E08.42 DIABETIC POLYNEUROPATHY ASSOCIATED WITH DIABETES MELLITUS DUE TO UNDERLYING CONDITION: ICD-10-CM

## 2024-07-26 DIAGNOSIS — G89.4 CHRONIC PAIN DISORDER: ICD-10-CM

## 2024-07-26 RX ORDER — METHOCARBAMOL 500 MG/1
500 TABLET, FILM COATED ORAL 3 TIMES DAILY
Qty: 90 TABLET | Refills: 2 | Status: SHIPPED | OUTPATIENT
Start: 2024-07-26

## 2024-07-26 NOTE — TELEPHONE ENCOUNTER
----- Message from Valeria Yanez sent at 7/26/2024 10:55 AM CDT -----  Regarding: Medication  Refill  Request                    Reply in [facility]: NO      Please refill the medication(s) listed below. Please call the patient  f   [phone] (M)      Medication    methocarbamoL (ROBAXIN) 500 MG Tab      Medication   HYDROcodone-acetaminophen (NORCO)  mg per tablet      Preferred Pharmacy:  [address]   Phone: [phone]  Fax: [phone]

## 2024-07-30 DIAGNOSIS — J44.9 CHRONIC OBSTRUCTIVE PULMONARY DISEASE, UNSPECIFIED COPD TYPE: Primary | ICD-10-CM

## 2024-07-30 DIAGNOSIS — G89.4 CHRONIC PAIN DISORDER: ICD-10-CM

## 2024-07-30 DIAGNOSIS — G89.4 CHRONIC PAIN SYNDROME: ICD-10-CM

## 2024-07-30 DIAGNOSIS — E08.42 DIABETIC POLYNEUROPATHY ASSOCIATED WITH DIABETES MELLITUS DUE TO UNDERLYING CONDITION: ICD-10-CM

## 2024-07-30 DIAGNOSIS — E11.65 TYPE 2 DIABETES MELLITUS WITH HYPERGLYCEMIA, WITH LONG-TERM CURRENT USE OF INSULIN: Chronic | ICD-10-CM

## 2024-07-30 DIAGNOSIS — Z79.4 TYPE 2 DIABETES MELLITUS WITH HYPERGLYCEMIA, WITH LONG-TERM CURRENT USE OF INSULIN: Chronic | ICD-10-CM

## 2024-07-30 DIAGNOSIS — J41.1 MUCOPURULENT CHRONIC BRONCHITIS: ICD-10-CM

## 2024-07-30 RX ORDER — BLOOD-GLUCOSE CONTROL, NORMAL
EACH MISCELLANEOUS
Qty: 400 EACH | Refills: 2 | Status: SHIPPED | OUTPATIENT
Start: 2024-07-30

## 2024-07-30 NOTE — TELEPHONE ENCOUNTER
Refill Routing Note   Medication(s) are not appropriate for processing by [facility] for the following reason(s):        No active prescription written by provider    [facility] action(s):  Defer        Medication Therapy Plan: discontinued on 7/18/2024 by Hoda Skaggs LPN for the following reason: Stop Taking at Discharge;DEFER TO YOU FOR REVIEW.      Appointments  past 12m or future 3m with PCP    Date Provider   Last Visit   7/25/2024 Chun Zapata MD   Next Visit   8/23/2024 Chun Zapata MD   ED visits in past 90 days: 0        Note composed:6:04 PM 07/30/2024

## 2024-07-30 NOTE — PHYSICIAN QUERY
Please further specify the heart failure diagnosis.        Chronic Systolic Heart Failure (HFrEF or HFmrEF)

## 2024-07-30 NOTE — TELEPHONE ENCOUNTER
Patient requesting refill on norco  Last office visit 5/10/2024   shows last refill on 06/13/2024  Patient does have a pain contract on file with [facility]  Patient last UDS 12/14/2023 was consistent with current therapy     CODEINE   Not Detected   Not Detected       Not Detected   Comment: INTERPRETIVE INFORMATION: Codeine, U  Positive Cutoff: 40 ng/mL  Methodology: Mass Spectrometry   MORPHINE   Not Detected   Not Detected       Not Detected   Comment: INTERPRETIVE INFORMATION:Morphine, U  Positive Cutoff: 20 ng/mL  Methodology: Mass Spectrometry   6-ACETYLMORPHINE   Not Detected   Not Detected       Not Detected   Comment: INTERPRETIVE INFORMATION:6-acetylmorphine, U  Positive Cutoff: 20 ng/mL  Methodology: Mass Spectrometry   OXYCODONE   Not Detected   Not Detected       Not Detected   Comment: INTERPRETIVE INFORMATION:Oxycodone, U  Positive Cutoff: 40 ng/mL  Methodology: Mass Spectrometry   NOROYXCODONE   Not Detected   Not Detected       Not Detected   Comment: INTERPRETIVE INFORMATION:Noroxycodone, U  Positive Cutoff: 100 ng/mL  Methodology: Mass Spectrometry   OXYMORPHONE   Not Detected   Not Detected       Not Detected   Comment: INTERPRETIVE INFORMATION:Oxymorphone, U  Positive Cutoff: 40 ng/mL  Methodology: Mass Spectrometry   NOROXYMORPHONE   Not Detected   Not Detected       Not Detected   Comment: INTERPRETIVE INFORMATION:Noroxymorphone, U  Positive Cutoff: 100 ng/mL  Methodology: Mass Spectrometry   HYDROCODONE   Present   Present       Present   Comment: INTERPRETIVE INFORMATION:Hydrocodone, U  Positive Cutoff: 40 ng/mL  Methodology: Mass Spectrometry   NORHYDROCODONE   Present   Present       Present   Comment: INTERPRETIVE INFORMATION:Norhydrocodone, U  Positive Cutoff: 100 ng/mL  Methodology: Mass Spectrometry   HYDROMORPHONE   Present   Present       Present   Comment: INTERPRETIVE INFORMATION:Hydromorphone, U  Positive Cutoff: 20 ng/mL  Methodology: Mass  Spectrometry   BUPRENORPHINE   Not Detected   Not Detected       Not Detected   Comment: INTERPRETIVE INFORMATION:Buprenorphine, U  Positive Cutoff: 5 ng/mL  Methodology: Mass Spectrometry   NORUBPRENORPHINE   Not Detected   Not Detected       Not Detected   Comment: INTERPRETIVE INFORMATION:Norbuprenorphine, U  Positive Cutoff: 20 ng/mL  Methodology: Mass Spectrometry   FENTANYL   Not Detected   Not Detected       Not Detected   Comment: INTERPRETIVE INFORMATION:Fentanyl, U  Positive Cutoff: 2 ng/mL  Methodology: Mass Spectrometry   NORFENTANYL   Not Detected   Not Detected       Not Detected   Comment: INTERPRETIVE INFORMATION:Norfentanyl, U  Positive Cutoff: 2 ng/mL  Methodology: Mass Spectrometry   MEPERIDINE METABOLITE   Not Detected   Not Detected       Not Detected   Comment: INTERPRETIVE INFORMATION:Meperidine metabolite, U  Positive Cutoff: 50 ng/mL  Methodology: Mass Spectrometry   TAPENTADOL   Not Detected   Not Detected       Not Detected   Comment: INTERPRETIVE INFORMATION:Tapentadol, U  Positive Cutoff: 100 ng/mL  Methodology: Mass Spectrometry   TAPENTADOL-O-SULF   Not Detected   Not Detected       Not Detected   Comment: INTERPRETIVE INFORMATION:Tapentadol-o-Sulf, U  Positive Cutoff: 200 ng/mL  Methodology: Mass Spectrometry   METHADONE   Negative   Not Detected       Not Detected   Comment: Presumptive negative by immunoassay. Testing by mass  spectrometry is available on request.  INTERPRETIVE INFORMATION: Methadone Screen, U  Positive Cutoff: 150 ng/mL  Methodology: Immunoassay   TRAMADOL   Negative   Not Detected       Not Detected   Comment: Presumptive negative by immunoassay. Testing by mass  spectrometry is available on request.  INTERPRETIVE INFORMATION:Tramadol Screen, U  Positive Cutoff: 100 ng/mL  Methodology: Immunoassay   AMPHETAMINE   Not Detected   Not Detected       Not Detected   Comment: INTERPRETIVE INFORMATION:Amphetamine, U  Positive Cutoff: 50 ng/mL  Methodology: Mass  Spectrometry   METHAMPHETAMINE   Not Detected   Not Detected       Not Detected   Comment: INTERPRETIVE INFORMATION:Methamphetamine, U  Positive Cutoff: 200 ng/mL  Methodology: Mass Spectrometry   MDMA- ECSTASY   Not Detected   Not Detected       Not Detected   Comment: INTERPRETIVE INFORMATION:MDMA, U  Positive Cutoff: 200 ng/mL  Methodology: Mass Spectrometry   MDA   Not Detected   Not Detected       Not Detected   Comment: INTERPRETIVE INFORMATION:MDA, U  Positive Cutoff: 200 ng/mL  Methodology: Mass Spectrometry   MDEA- Mary   Not Detected   Not Detected       Not Detected   Comment: INTERPRETIVE INFORMATION:MDEA, U  Positive Cutoff: 200 ng/mL  Methodology: Mass Spectrometry   METHYLPHENIDATE   Not Detected   Not Detected       Not Detected   Comment: INTERPRETIVE INFORMATION:Methylphenidate, U  Positive Cutoff: 100 ng/mL  Methodology: Mass Spectrometry   PHENTERMINE   Not Detected   Not Detected       Not Detected   Comment: INTERPRETIVE INFORMATION:Phentermine, U  Positive Cutoff: 100 ng/mL  Methodology: Mass Spectrometry   BENZOYLECGONINE   Negative   Not Detected       Not Detected   Comment: Presumptive negative by immunoassay. Testing by mass  spectrometry is available on request.  INTERPRETIVE INFORMATION:Cocaine Screen, U  Positive Cutoff: 150 ng/mL  Methodology: Immunoassay   ALPRAZOLAM   Not Detected   Not Detected       Not Detected   Comment: INTERPRETIVE INFORMATION:Alprazolam, U  Positive Cutoff: 40 ng/mL  Methodology: Mass Spectrometry   ALPHA-OH-ALPRAZOLAM   Not Detected   Not Detected       Not Detected   Comment: INTERPRETIVE INFORMATION:Alpha-OH-Alprazolam, U  Positive Cutoff: 20 ng/mL  Methodology: Mass Spectrometry   CLONAZEPAM   Not Detected   Not Detected       Not Detected   Comment: INTERPRETIVE INFORMATION:Clonazepam, U  Positive Cutoff: 20 ng/mL  Methodology: Mass Spectrometry   7-AMINOCLONAZEPAM   Not Detected   Not Detected       Not Detected   Comment: INTERPRETIVE  INFORMATION:7-Aminoclonazepam, U  Positive Cutoff: 40 ng/mL  Methodology: Mass Spectrometry   DIAZEPAM   Not Detected   Not Detected       Not Detected   Comment: INTERPRETIVE INFORMATION:Diazepam, U  Positive Cutoff: 50 ng/mL  Methodology: Mass Spectrometry   NORDIAZEPAM   Not Detected   Not Detected       Not Detected   Comment: INTERPRETIVE INFORMATION:Nordiazepam, U  Positive Cutoff: 50 ng/mL  Methodology: Mass Spectrometry   OXAZEPAM   Not Detected   Not Detected       Not Detected   Comment: INTERPRETIVE INFORMATION:Oxazepam, U  Positive Cutoff: 50 ng/mL  Methodology: Mass Spectrometry   TEMAZEPAM   Not Detected   Not Detected       Not Detected   Comment: INTERPRETIVE INFORMATION:Temazepam, U  Positive Cutoff: 50 ng/mL  Methodology: Mass Spectrometry   Lorazepam   Not Detected   Not Detected       Not Detected   Comment: INTERPRETIVE INFORMATION:Lorazepam, U  Positive Cutoff: 60 ng/mL  Methodology: Mass Spectrometry   MIDAZOLAM   Not Detected   Not Detected       Not Detected   Comment: INTERPRETIVE INFORMATION:Midazolam, U  Positive Cutoff: 20 ng/mL  Methodology: Mass Spectrometry   ZOLPIDEM   Not Detected   Not Detected       Not Detected   Comment: INTERPRETIVE INFORMATION:Zolpidem, U  Positive Cutoff: 20 ng/mL  Methodology: Mass Spectrometry   BARBITURATES   Negative   Not Detected       Not Detected   Comment: Presumptive negative by immunoassay. Testing by mass  spectrometry is available on request.  INTERPRETIVE INFORMATION:Barbiturates Screen, U  Positive Cutoff: 200 ng/mL  Methodology: Immunoassay   Creatinine, Urine 20.0 - 400.0 mg/dL <20.0 24.0 R 21.1 32.4 R 22.8 R 89.0 R 62.7   Comment: Creatinine <20 mg/dL is consistent with a dilute urine  specimen. Recollection to obtain a more concentrated  specimen, such as a first morning void, may be considered  if unexpected negative urine drug screening results were  obtained.   ETHYL GLUCURONIDE   Negative   Not Detected       Not Detected   Comment:  Presumptive negative by immunoassay. Testing by mass  spectrometry is available on request.  INTERPRETIVE INFORMATION:Ethyl Glucuronide Screen, U  Positive Cutoff: 500 ng/mL  Methodology: Immunoassay   MARIJUANA METABOLITE   Negative   Not Detected CM       Not Detected   Comment: Presumptive negative by immunoassay. Testing by mass  spectrometry is available on request.  INTERPRETIVE INFORMATION: THC (Cannabinoids) Screen, U  Positive Cutoff: 50 ng/mL  Methodology: Immunoassay   PCP   Negative   Not Detected       Not Detected   Comment: Presumptive negative by immunoassay. Testing by mass  spectrometry is available on request.  INTERPRETIVE INFORMATION:Phencyclidine Screen, U  Positive Cutoff: 25 ng/mL  Methodology: Immunoassay   CARISOPRODOL   Negative   Not Detected CM       Not Detected CM   Comment: Presumptive negative by immunoassay. Testing by mass  spectrometry is available on request.  INTERPRETIVE INFORMATION: Carisoprodol Screen, U  Positive Cutoff: 100 ng/mL  Methodology: Immunoassay  The carisoprodol immunoassay has cross-reactivity to  carisoprodol and meprobamate.   Naloxone   Not Detected   Not Detected       Not Detected   Comment: INTERPRETIVE INFORMATION:Naloxone, U  Positive Cutoff: 100 ng/mL  Methodology: Mass Spectrometry   Gabapentin   Not Detected   Not Detected       Not Detected   Comment: INTERPRETIVE INFORMATION:Gabapentin, U  Positive Cutoff: 3,000 ng/mL  Methodology: Mass Spectrometry   Pregabalin   Present   Present       Present   Comment: INTERPRETIVE INFORMATION:Pregabalin, U  Positive Cutoff: 3,000 ng/mL  Methodology: Mass Spectrometry   Alpha-OH-Midazolam   Not Detected   Not Detected       Not Detected   Comment: INTERPRETIVE INFORMATION:Alpha-OH-Midazolam, U  Positive Cutoff: 20 ng/mL  Methodology: Mass Spectrometry   Zolpidem Metabolite   Not Detected   Not Detected       Not Detected   Comment: INTERPRETIVE INFORMATION:Zolpidem Metabolite, U  Positive Cutoff: 100  ng/mL  Methodology: Mass Spectrometry   PAIN MANAGEMENT DRUG PANEL   See Below   See Below CM       See Umu

## 2024-07-30 NOTE — PHYSICIAN QUERY
Due to the conflicting clinical picture, please clinically validate the diagnosis of acute on chronic respiratory failure with hypoxia.   The respiratory condition has been ruled out and other diagnosis ruled in (please specify): chronic respiratory failure with hypoxia

## 2024-07-30 NOTE — TELEPHONE ENCOUNTER
----- Message from Ni Smith MA sent at 7/30/2024  3:04 PM CDT -----  Regarding: Refill Request  Who Called:CAYLA GOODEN [[id number]]           New Prescription or Refill : Refill      RX Name and Strength:  HYDROcodone-acetaminophen (NORCO)  mg per tablet            30 day or 90 day RX: 30           Local or Mail Order : local            Preferred Pharmacy: [address]       Would the patient rather a call back or a response via [facility]? call        Best Call Back Number:  [phone]

## 2024-07-31 RX ORDER — IPRATROPIUM BROMIDE AND ALBUTEROL 20; 100 UG/1; UG/1
SPRAY, METERED RESPIRATORY (INHALATION)
Qty: 4 G | Refills: 0 | OUTPATIENT
Start: 2024-07-31

## 2024-07-31 RX ORDER — IPRATROPIUM BROMIDE AND ALBUTEROL 20; 100 UG/1; UG/1
SPRAY, METERED RESPIRATORY (INHALATION)
Qty: 4 G | Refills: 6 | Status: SHIPPED | OUTPATIENT
Start: 2024-07-31

## 2024-07-31 RX ORDER — ALBUTEROL SULFATE 1.25 MG/3ML
1.25 SOLUTION RESPIRATORY (INHALATION) EVERY 6 HOURS PRN
Qty: 75 ML | Refills: 9 | Status: SHIPPED | OUTPATIENT
Start: 2024-07-31 | End: 2025-07-31

## 2024-07-31 RX ORDER — HYDROCODONE BITARTRATE AND ACETAMINOPHEN 10; 325 MG/1; MG/1
1 TABLET ORAL EVERY 8 HOURS PRN
Qty: 90 TABLET | Refills: 0 | Status: SHIPPED | OUTPATIENT
Start: 2024-07-31 | End: 2024-08-30

## 2024-07-31 NOTE — TELEPHONE ENCOUNTER
No care due was identified.  [facility] Embedded Care Due Messages. Reference number: [id number].   7/31/2024 4:41:13 PM CDT

## 2024-07-31 NOTE — TELEPHONE ENCOUNTER
Refill Routing Note   Medication(s) are not appropriate for processing by [facility] for the following reason(s):        Due for refill >6 months ago: pt called pharmacy to request refill, but this would be a restart of therapy    [facility] action(s):  Defer      Medication Therapy Plan:         Appointments  past 12m or future 3m with PCP    Date Provider   Last Visit   7/25/2024 Chun Zapata MD   Next Visit   8/23/2024 Chun Zapata MD   ED visits in past 90 days: 0        Note composed:4:00 PM 07/31/2024

## 2024-07-31 NOTE — TELEPHONE ENCOUNTER
No care due was identified.  [facility] Embedded Care Due Messages. Reference number: [id number].   7/31/2024 12:54:00 PM CDT

## 2024-07-31 NOTE — TELEPHONE ENCOUNTER
----- Message from Rod Capellan sent at 7/31/2024  4:35 PM CDT -----   Name of Who is Calling:     What is the request in detail:  request call back in reference to refill medication  albuterol (ACCUNEB) 1.25 mg/3 mL Nebu   / patient received medication in the hospital  /request send to pharmacy listed below Please contact to further discuss and advise      Can the clinic reply by [facility]:     What [facility] Back [facility]    [phone]/    lora / wilfredo       [facility] [address]

## 2024-08-06 ENCOUNTER — CLINICAL SUPPORT (OUTPATIENT)
Dept: DIABETES | Facility: CLINIC | Age: 79
End: 2024-08-06
Payer: MEDICARE

## 2024-08-06 ENCOUNTER — TELEPHONE (OUTPATIENT)
Dept: INTERNAL MEDICINE | Facility: CLINIC | Age: 79
End: 2024-08-06
Payer: MEDICARE

## 2024-08-06 DIAGNOSIS — Z79.4 TYPE 2 DIABETES MELLITUS WITH HYPERGLYCEMIA, WITH LONG-TERM CURRENT USE OF INSULIN: ICD-10-CM

## 2024-08-06 DIAGNOSIS — J44.9 CHRONIC OBSTRUCTIVE PULMONARY DISEASE, UNSPECIFIED COPD TYPE: ICD-10-CM

## 2024-08-06 DIAGNOSIS — J41.1 MUCOPURULENT CHRONIC BRONCHITIS: ICD-10-CM

## 2024-08-06 DIAGNOSIS — E11.65 TYPE 2 DIABETES MELLITUS WITH HYPERGLYCEMIA, WITH LONG-TERM CURRENT USE OF INSULIN: ICD-10-CM

## 2024-08-06 PROCEDURE — 99999 PR PBB SHADOW E&M-EST. PATIENT-LVL II: CPT | Mod: PBBFAC,,, | Performed by: DIETITIAN, REGISTERED

## 2024-08-06 PROCEDURE — 99212 OFFICE O/P EST SF 10 MIN: CPT | Mod: PBBFAC | Performed by: DIETITIAN, REGISTERED

## 2024-08-06 PROCEDURE — G0108 DIAB MANAGE TRN  PER INDIV: HCPCS | Mod: PBBFAC | Performed by: DIETITIAN, REGISTERED

## 2024-08-06 PROCEDURE — 99999PBSHW PR PBB SHADOW TECHNICAL ONLY FILED TO HB: Mod: PBBFAC,,,

## 2024-08-06 RX ORDER — ALBUTEROL SULFATE 1.25 MG/3ML
1.25 SOLUTION RESPIRATORY (INHALATION) EVERY 6 HOURS PRN
Qty: 75 ML | Refills: 9 | Status: SHIPPED | OUTPATIENT
Start: 2024-08-06 | End: 2025-08-06

## 2024-08-23 ENCOUNTER — OFFICE VISIT (OUTPATIENT)
Dept: INTERNAL MEDICINE | Facility: CLINIC | Age: 79
End: 2024-08-23
Payer: MEDICARE

## 2024-08-23 ENCOUNTER — HOSPITAL ENCOUNTER (OUTPATIENT)
Dept: RADIOLOGY | Facility: OTHER | Age: 79
Discharge: HOME | End: 2024-08-23
Attending: STUDENT IN AN ORGANIZED HEALTH CARE EDUCATION/TRAINING PROGRAM
Payer: MEDICARE

## 2024-08-23 VITALS
OXYGEN SATURATION: 98 % | SYSTOLIC BLOOD PRESSURE: 137 MMHG | HEIGHT: 60 IN | BODY MASS INDEX: 39.87 KG/M2 | HEART RATE: 81 BPM | WEIGHT: 203.06 LBS | DIASTOLIC BLOOD PRESSURE: 67 MMHG

## 2024-08-23 DIAGNOSIS — J42 CHRONIC BRONCHITIS, UNSPECIFIED CHRONIC BRONCHITIS TYPE: ICD-10-CM

## 2024-08-23 DIAGNOSIS — T45.8X5S ADVERSE EFFECT OF BISPHOSPHONATE, SEQUELA: ICD-10-CM

## 2024-08-23 DIAGNOSIS — Z79.4 TYPE 2 DIABETES MELLITUS WITH CHRONIC KIDNEY DISEASE, WITH LONG-TERM CURRENT USE OF INSULIN, UNSPECIFIED CKD STAGE: ICD-10-CM

## 2024-08-23 DIAGNOSIS — N28.1 RENAL CYST: ICD-10-CM

## 2024-08-23 DIAGNOSIS — J96.01 ACUTE RESPIRATORY FAILURE WITH HYPOXIA AND HYPERCAPNIA: ICD-10-CM

## 2024-08-23 DIAGNOSIS — E11.22 TYPE 2 DIABETES MELLITUS WITH CHRONIC KIDNEY DISEASE, WITH LONG-TERM CURRENT USE OF INSULIN, UNSPECIFIED CKD STAGE: ICD-10-CM

## 2024-08-23 DIAGNOSIS — Z91.89 AT RISK FOR BLEEDING: ICD-10-CM

## 2024-08-23 DIAGNOSIS — M79.7 FIBROMYALGIA: Chronic | ICD-10-CM

## 2024-08-23 DIAGNOSIS — J41.1 MUCOPURULENT CHRONIC BRONCHITIS: ICD-10-CM

## 2024-08-23 DIAGNOSIS — I25.118 CORONARY ARTERY DISEASE OF NATIVE ARTERY OF NATIVE HEART WITH STABLE ANGINA PECTORIS: ICD-10-CM

## 2024-08-23 DIAGNOSIS — Z87.891 FORMER SMOKER: ICD-10-CM

## 2024-08-23 DIAGNOSIS — J96.21 ACUTE ON CHRONIC RESPIRATORY FAILURE WITH HYPOXIA: ICD-10-CM

## 2024-08-23 DIAGNOSIS — J96.02 ACUTE RESPIRATORY FAILURE WITH HYPOXIA AND HYPERCAPNIA: ICD-10-CM

## 2024-08-23 DIAGNOSIS — F41.1 GENERALIZED ANXIETY DISORDER: ICD-10-CM

## 2024-08-23 DIAGNOSIS — J44.9 CHRONIC OBSTRUCTIVE PULMONARY DISEASE, UNSPECIFIED COPD TYPE: ICD-10-CM

## 2024-08-23 DIAGNOSIS — M81.0 AGE-RELATED OSTEOPOROSIS WITHOUT CURRENT PATHOLOGICAL FRACTURE: ICD-10-CM

## 2024-08-23 DIAGNOSIS — I50.43 ACUTE ON CHRONIC COMBINED SYSTOLIC AND DIASTOLIC CONGESTIVE HEART FAILURE: ICD-10-CM

## 2024-08-23 DIAGNOSIS — Z86.73 HISTORY OF CVA (CEREBROVASCULAR ACCIDENT): ICD-10-CM

## 2024-08-23 DIAGNOSIS — I69.354 HEMIPLEGIA AND HEMIPARESIS FOLLOWING CEREBRAL INFARCTION AFFECTING LEFT NON-DOMINANT SIDE: ICD-10-CM

## 2024-08-23 DIAGNOSIS — N18.31 STAGE 3A CHRONIC KIDNEY DISEASE: Primary | ICD-10-CM

## 2024-08-23 DIAGNOSIS — I25.10 ATHSCL HEART DISEASE OF NATIVE CORONARY ARTERY W/O ANG PCTRS: ICD-10-CM

## 2024-08-23 DIAGNOSIS — F33.40 RECURRENT MAJOR DEPRESSIVE DISORDER, IN REMISSION: ICD-10-CM

## 2024-08-23 DIAGNOSIS — I10 ESSENTIAL HYPERTENSION: Chronic | ICD-10-CM

## 2024-08-23 PROCEDURE — 99999 PR PBB SHADOW E&M-EST. PATIENT-LVL IV: CPT | Mod: PBBFAC,,, | Performed by: STUDENT IN AN ORGANIZED HEALTH CARE EDUCATION/TRAINING PROGRAM

## 2024-08-23 PROCEDURE — 76770 US EXAM ABDO BACK WALL COMP: CPT | Mod: TC

## 2024-08-23 PROCEDURE — 71046 X-RAY EXAM CHEST 2 VIEWS: CPT | Mod: TC,FY

## 2024-08-23 PROCEDURE — 76770 US EXAM ABDO BACK WALL COMP: CPT | Mod: 26,59,, | Performed by: RADIOLOGY

## 2024-08-23 PROCEDURE — 93975 VASCULAR STUDY: CPT | Mod: 26,,, | Performed by: RADIOLOGY

## 2024-08-23 PROCEDURE — 99214 OFFICE O/P EST MOD 30 MIN: CPT | Mod: PBBFAC,25 | Performed by: STUDENT IN AN ORGANIZED HEALTH CARE EDUCATION/TRAINING PROGRAM

## 2024-08-23 PROCEDURE — 71046 X-RAY EXAM CHEST 2 VIEWS: CPT | Mod: 26,,, | Performed by: RADIOLOGY

## 2024-08-23 RX ORDER — INSULIN GLARGINE 100 [IU]/ML
INJECTION, SOLUTION SUBCUTANEOUS
Qty: 30 ML | Refills: 3 | Status: SHIPPED | OUTPATIENT
Start: 2024-08-23

## 2024-08-23 RX ORDER — DOXYCYCLINE HYCLATE 100 MG
100 TABLET ORAL EVERY 12 HOURS
Qty: 10 TABLET | Refills: 0 | Status: SHIPPED | OUTPATIENT
Start: 2024-08-23 | End: 2024-08-28

## 2024-08-23 NOTE — PROGRESS NOTES
"[facility]    Subjective:     History was obtained from the patient and supplemented through chart review.  This pt is known to me.     Patient ID: Indira Waters is a 79 y.o. female.    Chief Complaint: breathing  (F/u)      HPI:    Patient is a 79 y.o. female w/ pmhx including DM, HTN, HLD, CAD, CHF, fibromyalgia presents forregular follow-up.  Had been seen late July for f/u with recent admission for SOB thought to be 2/2 copd exacerbation    Still has home health coming, which is helpful    Recent admission 7/18-7/20/2024  Now on oxygen 2 L intermittent, following O2 level    Hospital f/u for recent COPD exacerbation with rather new oxygen requirement,  Has portable Concentrator    DM  Improved A1C  Lantus 20 in the AM, 26 in the PM (Giuliana Montero   Humalog 13+ break, lunch, 11+ dinner  Reviewed glucoses, check A1C    Severely deconditioned  Working on exercise when feeling well    CAD\CHF HFpEF  Followed by Dr. Méndez  Cardiac Cath 5/6/2022 with significant blockages   Taking ASA 81, plavix  Not taking statin due to vasculitis thought to be 2/2 statin; on repatha   Torsemide taking  REstarted repatha    Normocytic Anemia  Family history of colon cancer in grandmother  Have attempted multiple times to get colonoscopy, but pt reticent. Pt contrinues to decline    Fibromyalgia   Chronic Pain Syndrome worse, better with neuropathic pain "patches"     Osteoporosis  Did not tolerate bisphosphonate in the past  AE dicussed, Restarted forteo with endocrinology already  Awaiting calcium and Vit D    HTN  Procardia 30 BID, losartan 50 mg daily?, toprol 100 mg BID  Cont    CHF   Grade II DD  Lasix 40 mg daily  Doing well    Hx of Drug induced Vasculitis  Thought to be 2/2 to atorvastatin, now on repatha q 14 days    CKD Stage 3a  Last seen nephro Dec 2022, needs to f/u  Avoid nsaids, nephrotoxic meds    Depression  zoloft 100 mg daily   Stable and doing well    History of cervical cancer at about age " 31, never a problem later in life  S/p hyst with 1 ovary remaining  Saw urogyn 2023    Wt Readings from Last 15 Encounters:   24 92.1 kg (203 lb 0.7 oz)   24 89.7 kg (197 lb 12 oz)   24 87.1 kg (192 lb 0.3 oz)   07/10/24 89.6 kg (197 lb 8.5 oz)   24 91.2 kg (201 lb 1 oz)   24 89.8 kg (198 lb)   05/15/24 89.9 kg (198 lb 3.2 oz)   05/10/24 84 kg (185 lb 3 oz)   24 84 kg (185 lb 3 oz)   24 84.6 kg (186 lb 8.2 oz)   24 84.6 kg (186 lb 9.6 oz)   24 90 kg (198 lb 6.6 oz)   23 88.5 kg (195 lb)   12/15/23 88.9 kg (195 lb 15.8 oz)   23 88.5 kg (195 lb 1.7 oz)      Son and   on mothers' day    Lupus? Pt reported in the past  STEVE normal ,   Never treated, not confirmed in our system  STEVE normal 2022  C3 and C4 normal       Medical History  Past Medical History:   Diagnosis Date    Arthritis     Back pain     Cancer     ovarian    Cervical cancer     Coronary artery disease     Depression     Diabetes mellitus     Fibromyalgia     Heart attack     History of MI (myocardial infarction)     Hyperlipidemia     Hypertension     Lupus     Stroke     slight left sided weakness       Review of Systems   Constitutional:  Positive for fatigue. Negative for fever.   HENT:  Negative for trouble swallowing.    Respiratory:  Positive for shortness of breath and wheezing.    Cardiovascular:  Positive for leg swelling. Negative for chest pain.   Gastrointestinal:  Negative for constipation, diarrhea, nausea and vomiting.   Musculoskeletal:  Negative for gait problem.   Skin:         bruising   Neurological:  Negative for dizziness and seizures.   Psychiatric/Behavioral:  Negative for hallucinations.          Surgical hx, family hx, social hx   Have been reviewed      Current Outpatient Medications:     acetaminophen (TYLENOL) 500 MG tablet, Take 2 tablets (1,000 mg total) by mouth every 8 (eight) hours as needed for Pain., Disp: , Rfl: 0     albuterol (ACCUNEB) 1.25 mg/3 mL Nebu, Take 3 mLs (1.25 mg total) by nebulization every 6 (six) hours as needed (for wheezing or shortness of breath). Rescue, Disp: 75 mL, Rfl: 9    allopurinoL (ZYLOPRIM) 300 MG tablet, Take 1 tablet (300 mg total) by mouth once daily., Disp: 90 tablet, Rfl: 3    aspirin (ECOTRIN) 81 MG EC tablet, Take 1 tablet (81 mg total) by mouth once daily., Disp: 30 tablet, Rfl: 0    blood sugar diagnostic Strp, To check BG 6 times daily, to use with insurance preferred meter, Disp: 200 each, Rfl: 11    blood-glucose meter kit, To check BG 6 times daily, to use with insurance preferred meter, Disp: 1 each, Rfl: 0    evolocumab (REPATHA SURECLICK) 140 mg/mL PnIj, Inject 1 mL (140 mg total) into the skin every 14 (fourteen) days., Disp: 2 each, Rfl: 11    HYDROcodone-acetaminophen (NORCO)  mg per tablet, Take 1 tablet by mouth every 6 (six) hours as needed., Disp: , Rfl:     HYDROcodone-acetaminophen (NORCO)  mg per tablet, Take 1 tablet by mouth every 8 (eight) hours as needed for Pain., Disp: 90 tablet, Rfl: 0    insulin lispro (HUMALOG KWIKPEN INSULIN) 100 unit/mL pen, Inject 10 Units into the skin 3 (three) times daily before meals., Disp: 27 mL, Rfl: 3    ipratropium-albuteroL (COMBIVENT RESPIMAT)  mcg/actuation inhaler, INHALE 1 PUFF INTO THE LUNGS BY MOUTH EVERY 6 HOURS, Disp: 4 g, Rfl: 6    lancets (TRUEPLUS LANCETS) 30 gauge Misc, Check blood sugar 4 times daily, Disp: 400 each, Rfl: 2    losartan (COZAAR) 50 MG tablet, Take 1 tablet (50 mg total) by mouth once daily., Disp: 90 tablet, Rfl: 3    melatonin (MELATIN) 3 mg tablet, Take 2 tablets (6 mg total) by mouth nightly as needed for Insomnia., Disp: , Rfl:     methocarbamoL (ROBAXIN) 500 MG Tab, Take 500 mg by mouth 4 (four) times daily., Disp: , Rfl:     methocarbamoL (ROBAXIN) 500 MG Tab, Take 1 tablet (500 mg total) by mouth 3 (three) times daily., Disp: 90 tablet, Rfl: 2    metoclopramide HCl (REGLAN) 5 MG  tablet, TAKE ONE TABLET BY MOUTH FOUR TIMES DAILY AS NEEDED FOR nausea prevention, Disp: 30 tablet, Rfl: 3    metoprolol succinate (TOPROL-XL) 100 MG 24 hr tablet, Take 1 tablet (100 mg total) by mouth once daily., Disp: 90 tablet, Rfl: 3    miconazole nitrate 2% (MICOTIN) 2 % Oint, Apply topically 2 (two) times daily., Disp: , Rfl:     NIFEdipine (PROCARDIA-XL) 30 MG (OSM) 24 hr tablet, Take 1 tablet (30 mg total) by mouth 2 (two) times a day., Disp: 180 tablet, Rfl: 3    pregabalin (LYRICA) 75 MG capsule, Take 1 capsule (75 mg total) by mouth 3 (three) times daily., Disp: , Rfl:     senna-docusate 8.6-50 mg (PERICOLACE) 8.6-50 mg per tablet, Take 1 tablet by mouth 2 (two) times daily as needed for Constipation., Disp: 60 tablet, Rfl: 0    sertraline (ZOLOFT) 100 MG tablet, Take 1 tablet (100 mg total) by mouth once daily., Disp: 90 tablet, Rfl: 3    torsemide (DEMADEX) 20 MG Tab, Take 1 tablet (20 mg total) by mouth once daily., Disp: 90 tablet, Rfl: 3    doxycycline (VIBRA-TABS) 100 MG tablet, Take 1 tablet (100 mg total) by mouth every 12 (twelve) hours. for 5 days, Disp: 10 tablet, Rfl: 0    insulin glargine U-100, Lantus, (LANTUS SOLOSTAR U-100 INSULIN) 100 unit/mL (3 mL) InPn pen, Take 20 units in the AM and 26 units in the PM, Disp: 30 mL, Rfl: 3    teriparatide 20 mcg/dose (620mcg/2.48mL) PnIj, Inject 20 mcg into the skin once daily. Discard remainder after 28 days of use. (Patient not taking: Reported on 7/24/2024), Disp: 2.48 mL, Rfl: 2    Objective:        Body mass index is 39.65 kg/m².  Vitals:    08/23/24 1138   BP: 137/67   Pulse: 81   SpO2: 98%   Weight: 92.1 kg (203 lb 0.7 oz)   Height: 5' (1.524 m)   PainSc:   7   PainLoc: Generalized     Physical Exam  Vitals and nursing note reviewed.   Constitutional:       General: She is not in acute distress.     Appearance: She is not ill-appearing.      Comments: Frail, cane   HENT:      Head: Normocephalic and atraumatic.   Eyes:      General: No scleral  icterus.  Cardiovascular:      Rate and Rhythm: Normal rate and regular rhythm.   Pulmonary:      Effort: Pulmonary effort is normal.      Breath sounds: Wheezing (expiratory wheezes) and rhonchi present.   Musculoskeletal:         General: No deformity.      Right lower leg: Edema (1+ PE) present.      Left lower leg: Edema (1+ PE) present.      Comments: 3+ PE bilat legs   Skin:     Comments: Senile purpura, echymoses   Neurological:      Mental Status: She is alert and oriented to person, place, and time.   Psychiatric:         Behavior: Behavior normal.           Lab Results   Component Value Date    WBC 11.07 07/25/2024    HGB 9.9 (L) 07/25/2024    HCT 31.9 (L) 07/25/2024     07/25/2024    CHOL 236 (H) 07/25/2024    TRIG 381 (H) 07/25/2024    HDL 40 07/25/2024    ALT 15 07/25/2024    AST 17 07/25/2024     07/25/2024    K 4.0 07/25/2024     07/25/2024    CREATININE 1.1 07/25/2024    BUN 20 07/25/2024    CO2 22 (L) 07/25/2024    TSH 2.059 06/05/2024    INR 1.0 06/21/2024    HGBA1C 8.2 (H) 07/25/2024       The ASCVD Risk score (Annamarie WEATHERS, et al., 2019) failed to calculate for the following reasons:    The patient has a prior MI or stroke diagnosis    (Imaging have been independently reviewed)   7/18/2024  CH[facility] imaging xray    Assessment:         1. Stage 3a chronic kidney disease    2. Chronic bronchitis, unspecified chronic bronchitis type    3. At risk for bleeding    4. Type 2 diabetes mellitus with chronic kidney disease, with long-term current use of insulin, unspecified CKD stage    5. Hemiplegia and hemiparesis following cerebral infarction affecting left non-dominant side    6. History of CVA (cerebrovascular accident)    7. Recurrent major depressive disorder, in remission    8. Generalized anxiety disorder    9. Acute respiratory failure with hypoxia and hypercapnia    10. Acute on chronic respiratory failure with hypoxia    11. Mucopurulent chronic bronchitis    12. Chronic  obstructive pulmonary disease, unspecified COPD type    13. Athscl heart disease of native coronary artery w/o ang pctrs    14. Acute on chronic combined systolic and diastolic congestive heart failure    15. Essential hypertension    16. 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 pectoris    17. Age-related osteoporosis without current pathological fracture    18. Former smoker    19. Adverse effect of bisphosphonate, sequela    20. Fibromyalgia            Plan:     Indira was seen today for breathing .    Diagnoses and all orders for this visit:    Stage 3a chronic kidney disease  -     insulin glargine U-100, Lantus, (LANTUS SOLOSTAR U-100 INSULIN) 100 unit/mL (3 mL) InPn pen; Take 20 units in the AM and 26 units in the PM  -     CBC Without Differential; Future  -     Comprehensive Metabolic Panel; Future    Chronic bronchitis, unspecified chronic bronchitis type  -     X-Ray Chest PA And Lateral; Future  -     doxycycline (VIBRA-TABS) 100 MG tablet; Take 1 tablet (100 mg total) by mouth every 12 (twelve) hours. for 5 days    At risk for bleeding    Type 2 diabetes mellitus with chronic kidney disease, with long-term current use of insulin, unspecified CKD stage  -     Hemoglobin A1C; Future    Hemiplegia and hemiparesis following cerebral infarction affecting left non-dominant side    History of CVA (cerebrovascular accident)    Recurrent major depressive disorder, in remission    Generalized anxiety disorder    Acute respiratory failure with hypoxia and hypercapnia    Acute on chronic respiratory failure with hypoxia    Mucopurulent chronic bronchitis    Chronic obstructive pulmonary disease, unspecified COPD type    Athscl heart disease of native coronary artery w/o ang pctrs    Acute on chronic combined systolic and diastolic congestive heart failure    Essential hypertension    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      41 min were used in chart review, evaluation and counseling of patient, documentation and review of results on same day of service     Visit today is associated with current or anticipated ongoing medical care related to this patient's single serious condition/complex condition of difficult to control COPD, CHF, Diabetes. The patient will return to see me as these issues will be followed longitudinally.        All medications were reviewed including potential side effects and risks/benefits.  Pt was counseled to call back if anything worsens or if questions arise.    Chun Zapata MD  Family Medicine  [facility]  [address]  [place] [address]  Phone [phone]  Fax [phone]

## 2024-08-24 DIAGNOSIS — F41.9 ANXIETY DISORDER, UNSPECIFIED TYPE: ICD-10-CM

## 2024-08-24 RX ORDER — SERTRALINE HYDROCHLORIDE 100 MG/1
100 TABLET, FILM COATED ORAL
Qty: 90 TABLET | Refills: 3 | Status: SHIPPED | OUTPATIENT
Start: 2024-08-24

## 2024-08-24 NOTE — TELEPHONE ENCOUNTER
No care due was identified.  [facility] Embedded Care Due Messages. Reference number: [id number].   8/24/2024 8:01:42 AM CDT

## 2024-08-25 NOTE — TELEPHONE ENCOUNTER
Refill Decision Note   Indira Waters  is requesting a refill authorization.  Brief Assessment and Rationale for Refill:  Approve     Medication Therapy Plan:         Comments:     Note composed:9:21 PM 08/24/2024

## 2024-08-30 ENCOUNTER — NURSE TRIAGE (OUTPATIENT)
Dept: ADMINISTRATIVE | Facility: CLINIC | Age: 79
End: 2024-08-30
Payer: MEDICARE

## 2024-08-30 DIAGNOSIS — G89.4 CHRONIC PAIN DISORDER: Primary | ICD-10-CM

## 2024-08-30 DIAGNOSIS — G89.4 CHRONIC PAIN SYNDROME: ICD-10-CM

## 2024-08-30 DIAGNOSIS — E08.42 DIABETIC POLYNEUROPATHY ASSOCIATED WITH DIABETES MELLITUS DUE TO UNDERLYING CONDITION: ICD-10-CM

## 2024-08-30 RX ORDER — HYDROCODONE BITARTRATE AND ACETAMINOPHEN 10; 325 MG/1; MG/1
1 TABLET ORAL EVERY 8 HOURS PRN
Qty: 90 TABLET | Refills: 0 | OUTPATIENT
Start: 2024-08-30 | End: 2024-09-29

## 2024-08-30 NOTE — TELEPHONE ENCOUNTER
----- Message from Rosa Isela Saunders sent at 8/30/2024  9:14 AM CDT -----  Type:  Pharmacy Calling to Clarify an RX    Name of Caller:CAYLA GOODEN [[id number]]    Pharmacy Name:[facility] [address]   Phone: [phone]  Fax: [phone]    Prescription Name:HYDROcodone-acetaminophen (NORCO)  mg per table    What do they need to clarify?:authorization     Best Call Back Number:[phone]    Additional Information: patient states she has requested a refill on her medication. Patient states she would like to get a refill sent in as soon as possible as she will run out of medication on Sunday 09/01. Patient states she would like a call back with further assistance and more information as soon as possible.

## 2024-08-30 NOTE — TELEPHONE ENCOUNTER
----- Message from Francoise Barrera sent at 8/30/2024  4:09 PM CDT -----  Type: RX REFILL REQUEST          Who Called : Esha- Daughter          Refill or New Rx: Refill          Rx Name and Strength:HYDROcodone-acetaminophen (NORCO)  mg per tablet        Pt is completely out of medication        Would the patient rather a call back or a response via [facility]? Call        Best Call Back Number: [phone]          Additional Information:    [address] [address]  Phone: [phone] Fax: [phone]

## 2024-08-30 NOTE — TELEPHONE ENCOUNTER
Patient requesting refill on Norco  Last office visit 5-10-24   shows last refill on 7-31-24  Patient does have a pain contract on file with [facility]  Patient last UDS 12-14-23 was consistent with current therapy       Returned pharmacist call and stated that it was a refill request from the patient.

## 2024-08-30 NOTE — TELEPHONE ENCOUNTER
Reason for Disposition   SEVERE abdominal pain (e.g., excruciating)   SEVERE abdominal pain (e.g., excruciating) and present > 1 hour    Additional Information   Negative: Passed out (i.e., fainted, collapsed and was not responding)   Negative: Shock suspected (e.g., cold/pale/clammy skin, too weak to stand, low BP, rapid pulse)   Negative: Sounds like a life-threatening emergency to the triager   Negative: SEVERE back pain of sudden onset and age > 60 years   Negative: Shock suspected (e.g., cold/pale/clammy skin, too weak to stand, low BP, rapid pulse)   Negative: Difficult to awaken or acting confused (e.g., disoriented, slurred speech)   Negative: Sounds like a life-threatening emergency to the triager    Protocols used: Back Pain-A-OH, Diarrhea-A-OH  Pt states she is trying to reach Dr. Pereira for back pain. She then reported severe abdominal pain present over an hour with diarrhea. Instructed to have someone bring her to the ED now and to call 911 for EMS if she becomes worse. Pt verbalized understanding.

## 2024-09-03 ENCOUNTER — OFFICE VISIT (OUTPATIENT)
Dept: PAIN MEDICINE | Facility: CLINIC | Age: 79
End: 2024-09-03
Payer: MEDICARE

## 2024-09-03 ENCOUNTER — HOSPITAL ENCOUNTER (OUTPATIENT)
Dept: RADIOLOGY | Facility: OTHER | Age: 79
Discharge: HOME | End: 2024-09-03
Attending: NURSE PRACTITIONER
Payer: MEDICARE

## 2024-09-03 VITALS
BODY MASS INDEX: 40.34 KG/M2 | OXYGEN SATURATION: 98 % | RESPIRATION RATE: 18 BRPM | TEMPERATURE: 99 F | DIASTOLIC BLOOD PRESSURE: 58 MMHG | WEIGHT: 206.56 LBS | SYSTOLIC BLOOD PRESSURE: 95 MMHG | HEART RATE: 89 BPM

## 2024-09-03 DIAGNOSIS — M54.16 LUMBAR RADICULOPATHY, CHRONIC: ICD-10-CM

## 2024-09-03 DIAGNOSIS — M17.0 PRIMARY OSTEOARTHRITIS OF BOTH KNEES: ICD-10-CM

## 2024-09-03 DIAGNOSIS — E11.40 PAINFUL DIABETIC NEUROPATHY: ICD-10-CM

## 2024-09-03 DIAGNOSIS — M47.816 LUMBAR SPONDYLOSIS: ICD-10-CM

## 2024-09-03 DIAGNOSIS — G89.4 CHRONIC PAIN DISORDER: Primary | ICD-10-CM

## 2024-09-03 DIAGNOSIS — M51.36 DDD (DEGENERATIVE DISC DISEASE), LUMBAR: ICD-10-CM

## 2024-09-03 PROCEDURE — 72114 X-RAY EXAM L-S SPINE BENDING: CPT | Mod: 26,,, | Performed by: RADIOLOGY

## 2024-09-03 PROCEDURE — 72114 X-RAY EXAM L-S SPINE BENDING: CPT | Mod: TC,FY

## 2024-09-03 PROCEDURE — 99999 PR PBB SHADOW E&M-EST. PATIENT-LVL V: CPT | Mod: PBBFAC,,, | Performed by: NURSE PRACTITIONER

## 2024-09-03 PROCEDURE — 99214 OFFICE O/P EST MOD 30 MIN: CPT | Mod: S$PBB,,, | Performed by: NURSE PRACTITIONER

## 2024-09-03 PROCEDURE — 99215 OFFICE O/P EST HI 40 MIN: CPT | Mod: PBBFAC | Performed by: NURSE PRACTITIONER

## 2024-09-03 RX ORDER — HYDROCODONE BITARTRATE AND ACETAMINOPHEN 10; 325 MG/1; MG/1
1 TABLET ORAL EVERY 8 HOURS PRN
Qty: 90 TABLET | Refills: 0 | Status: SHIPPED | OUTPATIENT
Start: 2024-09-03

## 2024-09-03 RX ORDER — PREGABALIN 75 MG/1
75 CAPSULE ORAL 3 TIMES DAILY
Qty: 90 CAPSULE | Refills: 3 | Status: SHIPPED | OUTPATIENT
Start: 2024-09-03

## 2024-09-03 NOTE — PROGRESS NOTES
Chronic patient Established Note (Follow up visit)        Interval History 9/3/2024:  The patient returns to clinic today for follow up of pain. Since last visit, she was admitted for CHF exacerbation. She spent a few weeks in inpatient rehab. She is currently participating in home health PT. She reports worsened low back pain that radiates into the posterolateral aspect of both legs to her feet, left grater than right. Her pain is worse with walking, and getting in/out of a vehicle. She continues to report left knee pain. She does have benefit with Qutenza patches and needs to schedule the next application. She is taking Lyrica and Robaxin. She also takes Norco as needed for severe pain with benefit. She denies any adverse effects. She has been out of medication since Sunday. Her pain today is 10/10.    Interval History 5/10/2024:  The patient returns to clinic today for follow up of pain. She is s/p left knee steroid injection on 3/28/2024. She reports limited relief. She is s/p right knee steroid injection on 4/18/2024. She reports significant relief of her right knee pain. She continues to report neuropathic pain into her bilateral feet. She describes this pain as burning and tingling in nature. She does have benefit with Qutenza patches. She is taking Lyrica. She also takes Norco as needed with benefit. She denies any adverse effects. She denies any other health changes. Her pain today is 9/10.    Interval History 3/14/2024:  Indira Waters returns today for f/u of her painful neuropathy in the feet. She was last seen in January for this complaint. Qutenza patches were administered at that time. We also continued medication management with Robaxin, Lyrica, and Norco. She reports incredible relief of neuropathic symptoms with qutenza injections. Significantly improves walking ability. Primary complaint today is left knee pain. Started several years ago. Has been mostly intermittent over the years, but  has been more constant and severe over the past 2 months. Localized over medial joint line. No falls. Pain today is an 8/10. Continues Norco 10/325 mg TID, consistently. No adverse effects. Feels like medications continue to help with functional mobility and ADL ability..     Interval History 1/26/2024:  The patient returns to clinic today for foot pain. She continues to report neuropathic pain into her feet. She describes this as burning in nature. She does find benefit with Qutenza patches. She denies any rash or increased pain after the patch application. She continues to take Lyrica, Robaxin, and Norco with benefit. She denies any adverse effects. She denies any other health changes. Her pain today is 8/10.    Interval History 12/14/2023:  The patient returns to clinic today for follow up of back and leg pain. She continues to report low back pain that radiates into both legs. She continues to report bilateral neuropathic pain into the feet. She states that today is a bad day. She is having more left foot and toe pain. Her pain is worse with prolonged standing and walking. She does have benefit with Qutenza patches. She is taking Lyrica, Robaxin, and Norco with benefit and without adverse effects. She denies other health changes. Her pain today is 8/10.    Interval History 10/11/2023:  The patient returns to clinic today for follow up of bilateral foot pain. She continues to report neuropathic pain to her feet. Her pain is worse with standing and walking. She does have benefit with Qutenza patches. She also takes Lyrica, Robaxin, and Norco. She denies any adverse effects. She denies any other health changes. Her pain today is 8/10.    Interval History 9/15/2023:  The patient returns to clinic today for follow up of back and leg pain. She did have benefit with Qutenza patches. She continues to report low back pain with intermittent radicular leg pain. She continues to report neuropathic pain into her feet. She  continues to take Lyrica and Robaxin with benefit. She also takes Norco as needed with benefit. She denies any adverse effects. She denies any other health changes. Her pain today is 9/10.    Interval History 6/27/2023:  The patient returns to clinic today for follow up of pain. She continues to report nerve pain to her feet bilaterally. This pain is worse with standing and walking. She is taking Lyrica and Robaxin. She also takes Norco as needed with benefit and without adverse effects. She denies any other health changes. Her pain today is 9/10.    Interval History 6/16/2023:  The patient returns to clinic today for follow up of back and leg pain. She continues to report low back pain. She also reports diabetic neuropathy to her bilateral feet. Her pain is worse with standing and walking. She has tried Qutenza patches with benefit in the past. She continues to take Robaxin and Lyrica. She also takes Norco as needed with benefit and without adverse effects. She denies any other health changes. Her pain today is 10/10.    Interval History 3/17/2023:  Mrs Waters presents for follow up of chronic, continuous neuropathic pain to [facility]. She is s/p Nevro SCS trial and unfortunately she did not get the relief she was hoping for and 50% at best relief but this was not consistent. She has no constitutional s/s concerning for infection and denies newer neurological changes since trial. She continues with medication mgt and states Qutenza application has been providing significant benefit.     Interval History 2/10/2023:  Mrs Waters presents for follow up of chronic lower back painn and radicular pain in conjunction with PDN to bilateral feet. She is doing fair with medication mgt of Norco, Robaxin, and Lyrica and does need refill at this time. She denies SE of medications. She is also here for Qutenza application today. She is scheduled to have upcoming SCS trial with [facility] address her PDN.     Interval History  12/13/2022:  Mrs Waters presents for follow up of chronic pain. She is ready to repeat Qutenza application as it has been >91 days and she had significant improvement of symptoms of PDN. She recently had repeat A1C and just above 10.0 but is decreasing. She continues to take medication with benefit and denies SE of medication. Medication regimen include Norco 10/325mg TID, Robaxin 500mg and Lyrica 150mg.     Interval History 10/12/2022:  Mrs Waters presents for follow up of chronic neuropathy. She is s/p qutenza patch placement with improved functioning and neuropathy control. Unfortunately her A1C was above 11 which inhibits her from Nevro SCS trial at this time. She is eager to have SCS trial. She denies new areas of pain or neurological changes. She continued to take  Norco 10/325mg TID, Robaxin 500mg and Lyrica 150mg TID with benefit and denies significant SE of medications.     Interval History 9/8/2022:  Mrs Waters presents for follow up of chronic lower back painn and radicular pain in conjunction with PDN to bilateral feet. She is doing fair with medication mgt of Norco, Robaxin, and Lyrica and does need refill at this time. She denies SE of medications. She is patiently awaiting her A1C to become lower than 10 to proceed with SCS trial.     Interval History 8/12/2022:  Mrs Waters presents for delayed FU. She has had continuous pain to lumbar and radicular pain but also peripheral neuropathy complaints. Over interval she has had CVA but doing fair. Her A1C has unfortunately elevated above 10 at this time and SCS trial placed on hold but phychiatric evaluation complete. She continues to take Norco 10/325mg TID, Robaxin 500mg TID and Lyrica 150mg TID with some benefit and denies SE of medications. No s/s concerning for cauda equina.     Interval History 4/12/2022:  Patient returns to clinic today for follow-up. Her neuropathic pain continues to be an issue for her, but she says that she is able to manage. She is  taking Norco 10-325mg TID, Robaxin 500mg TID, and Lyrica 150mg TID without any adverse side effects. She denies any changes in her symptoms. She would like to proceed with SCS trial. Discussed last time that her HbA1c should be <10, was 9.8 on most recent labs. Seen by psychology and cleared for SCS.     Interval History 2/14/2022:  Mrs Waters presents for follow up. Pt states overall neuropathy continues. Since last visit she has been stable with medication mgt and takign Norco 10/325mg TID, Robaxin 500mg TID and Lyrica 150mg TID. She denies new areas of pain or neurological changes. Medication adequate to control pain without adverse SE.      Interval History 12/21/2021:  Pt presents for urgent evaluation s/p fall in kitchen last night. Pt unsure of LOC or head trauma but states some amnesia of events. Pt is having left knee pain, B ankle pain L>R and lower back/buttock pain. Daughter further states tremor like activity last night. During visit daughter asked for bedside commode due to inability to ambulate.  Pt during visit appeared somnolent, pale and began vomiting. Discussed going to ER for further evaluation.      Interval History 12/14/2021:  Mrs Waters presents for follow up of chronic pain complaints. She is hopeful she will have A1C lower soon to move forward with SCS. She is doing fair with medication mgt alone at this time and denies SE of medications. Pt is currently taking Norco 10/325mg TID PRN, Lyrica 150mg TID, and Robaxin 500mg TID. She denies new areas of pain or neurological changes.      Interval History 10/14/2021:  The Pt presents for follow up and s/p B Lumbar sympathetic blocks. Pt states this has done well but ready to proceed with SCS trial. She is aware A1C must be under tight control and Pt and daughter re-iterate knowing this. Pt also mentions DKA admission and diagnosis of vasculitis as well over interval. Pt continues to take hydrocodone 10/325 mg TID PRN, Lyrica 150 mg TID, and  Robaxin 500mg TID. She denies any SE of medication, denies new neurological changes.      Interval Hx 09/16/2021  Indira Waters presents to the clinic for a follow-up appointment for f/u after bilateral lumbar sympathetic block on 8/25/21.Patient reports >50% relief after lumbar sympathetic block that lasted 2 weeks when she then developed a UTI/DKA, was admitted to the hospital and pain recurred.  Current pain intensity is 8/10.     Interval History 8/12/2021:  Indira Waters presents to the clinic for a follow-up appointment for BLE diabetic neuropathy, painful. Continues with Norco 10/325mg, Lyrica 150mg TID, Robaxin 750mg daily PRN. She had to cancel previously scheduled lumbar sympathetic block 2/2 lithotripsy for painful kidney stones, which have now passed. She still has intermittent pain with urination but overall that pain is improving. She had to cancel previous angiogram for this same reason - this has not been rescheduled yet. She denies any change in her LE pain. Denies any new neurological sxs in BLEs.     Interval History 6/10/21:  Indira Waters presents to the clinic for a 2 week follow-up appointment from lumbar sympathetic nerve block in early May. She reports minimal relief from this intervention, but did note that it helped some, briefly. Since the last visit, Indira Waters states the pain has been persistant. Current pain intensity is 10/10. Patient has chronic generalized diffuse pain, most pronounced in her BL lower extremities 2/2 diabetic neuropathy. HSe states that the pain is a little better than at her last visit. Most days are 10/10, but some days are better than others. Her pain is aggravated by exertion, walking, or sitting/standing for extended periods of time. He pain is mildly improved by rest and medications. She is currently taking Lyrica 150 PO TID, Zoloft, and Norco 10-325mg TID PRN. She states that the pain meds are helping but she is still constantly in pain and  "unable to participate in her ADLs. She has now established care with PCP for assistance w/ poorly controlled T2DM, last A1c 11.4. She has not yet established care w/ Rheumatology for fibromyalgia management.    Interval HPI 4/15/21:  Patient returns for follow up of chronic generalized diffuse pain. At the last visit, had EMG (not yet completed), lumbar and hip x-ray imaging ordered. She was also referred to Rheumatology for fibromyalgia management and referral to PCP for DM2 management and weaning of zoloft for transition to cymbalta for treatment of neuropathy. She had her Lyrica increased to 150mg PO TID, and prescribed Norco 10mg TID with opioid contract signed. She has been taking Norco 10mg TID and it has been helping her "bad" pains but does not take all of her pain away. She has not yet been set up with her new PCP or rheumatologist yet for fibromyalgia. Still continues to have generalized pain everywhere including feet, hips, legs, lower and upper back, neck and shoulder pain. Continues to have neuropathy in the bilateral lower extremities due to uncontrolled DM2.    Initial Visit 3/11/21:  Indira Waters presents to the clinic for the evaluation of chronic pain. Complaining of pain everywhere including feet, legs, hips, lower and upper back, neck, shoulder. Pain started 5+ years ago. Pain 10/10 at worse. She was referred here by her PCP Dr. Ramos who she has been seeing for over 6 years. She states her pain is aggravated by any physical activity/movement. She takes lyrica, robaxin, and Norco 10 TID with mild relief of pain. Per patient, she was referred here by her PCP for medication refill. She has not tried physical therapy for several years, she states it did not help last time she was in PT. She says she is trying to exercise daily by doing yoga. She walks with a walker. She has numerous comorbidities including DM2 (Last A1C 9+ per prior family medicine note in Jan 2021), fibromyalgia, lupus, DDD. She " states she would like to find a new PCP as she no longer lives near her old one. Patient denies night fever/night sweats, urinary incontinence, bowel incontinence and significant weight loss.      Pain Disability Index Review:      5/10/2024     2:47 PM 4/18/2024     3:34 PM 3/28/2024     1:33 PM   Last 3 PDI Scores   Pain Disability Index (PDI) 49 24 8     Pain Medications:  Norco 10-325mg TID, Robaxin 500mg TID, and Lyrica 150mg TID    Opioid Contract: yes     report:  Reviewed and consistent with medication use as prescribed.    Pain Procedures:    - reports possible back injection 10+ years ago  - Lumbar sympathetic nerve block 05/05/21 - Dr. Pereira - minimal relief    Physical Therapy/Home Exercise: no     Imaging:   Hip X-ray 4/7/21  FINDINGS:  Osseous structures appear intact without evidence of fracture or osseous destructive process.  No apparent dislocation.     Modest degenerative change or significant joint space narrowing.     Lumbar X-ray 4/7/21  FINDINGS:  Diffuse bony demineralization.  Vertebral bodies are normal in height without evidence of fracture.  No pars defects.     Normal sagittal alignment is preserved.  No spondylolisthesis. No abnormal translation with flexion and extension.     Intervertebral disc heights are well maintained.  Mild facet arthropathy in the lower lumbar spine.     Mild scattered vascular calcification.     Impression:     No evidence of fracture or malalignment.     Mild facet arthropathy in the lower lumbar spine.     Diffuse bony demineralization.  Consider correlation with DEXA.    Allergies:   Review of patient's allergies indicates:   Allergen Reactions    Bleach (sodium hypochlorite) Shortness Of Breath    Nitrofurantoin macrocrystalline Anaphylaxis    Lipitor [atorvastatin] Diarrhea and Rash    Nsaids (non-steroidal anti-inflammatory drug)      Tolerates aspirin      Pcn [penicillins]     Statins-hmg-coa reductase inhibitors     Toradol [ketorolac]         Current Medications:   Current Outpatient Medications   Medication Sig Dispense Refill    acetaminophen (TYLENOL) 500 MG tablet Take 2 tablets (1,000 mg total) by mouth every 8 (eight) hours as needed for Pain.  0    albuterol (ACCUNEB) 1.25 mg/3 mL Nebu Take 3 mLs (1.25 mg total) by nebulization every 6 (six) hours as needed (for wheezing or shortness of breath). Rescue 75 mL 9    allopurinoL (ZYLOPRIM) 300 MG tablet Take 1 tablet (300 mg total) by mouth once daily. 90 tablet 3    aspirin (ECOTRIN) 81 MG EC tablet Take 1 tablet (81 mg total) by mouth once daily. 30 tablet 0    blood sugar diagnostic Strp To check BG 6 times daily, to use with insurance preferred meter 200 each 11    blood-glucose meter kit To check BG 6 times daily, to use with insurance preferred meter 1 each 0    evolocumab (REPATHA SURECLICK) 140 mg/mL PnIj Inject 1 mL (140 mg total) into the skin every 14 (fourteen) days. 2 each 11    HYDROcodone-acetaminophen (NORCO)  mg per tablet Take 1 tablet by mouth every 6 (six) hours as needed.      insulin glargine U-100, Lantus, (LANTUS SOLOSTAR U-100 INSULIN) 100 unit/mL (3 mL) InPn pen Take 20 units in the AM and 26 units in the PM 30 mL 3    insulin lispro (HUMALOG KWIKPEN INSULIN) 100 unit/mL pen Inject 10 Units into the skin 3 (three) times daily before meals. 27 mL 3    ipratropium-albuteroL (COMBIVENT RESPIMAT)  mcg/actuation inhaler INHALE 1 PUFF INTO THE LUNGS BY MOUTH EVERY 6 HOURS 4 g 6    lancets (TRUEPLUS LANCETS) 30 gauge Misc Check blood sugar 4 times daily 400 each 2    losartan (COZAAR) 50 MG tablet Take 1 tablet (50 mg total) by mouth once daily. 90 tablet 3    melatonin (MELATIN) 3 mg tablet Take 2 tablets (6 mg total) by mouth nightly as needed for Insomnia.      methocarbamoL (ROBAXIN) 500 MG Tab Take 500 mg by mouth 4 (four) times daily.      methocarbamoL (ROBAXIN) 500 MG Tab Take 1 tablet (500 mg total) by mouth 3 (three) times daily. 90 tablet 2     metoclopramide HCl (REGLAN) 5 MG tablet TAKE ONE TABLET BY MOUTH FOUR TIMES DAILY AS NEEDED FOR nausea prevention 30 tablet 3    metoprolol succinate (TOPROL-XL) 100 MG 24 hr tablet Take 1 tablet (100 mg total) by mouth once daily. 90 tablet 3    miconazole nitrate 2% (MICOTIN) 2 % Oint Apply topically 2 (two) times daily.      NIFEdipine (PROCARDIA-XL) 30 MG (OSM) 24 hr tablet Take 1 tablet (30 mg total) by mouth 2 (two) times a day. 180 tablet 3    pregabalin (LYRICA) 75 MG capsule Take 1 capsule (75 mg total) by mouth 3 (three) times daily.      senna-docusate 8.6-50 mg (PERICOLACE) 8.6-50 mg per tablet Take 1 tablet by mouth 2 (two) times daily as needed for Constipation. 60 tablet 0    sertraline (ZOLOFT) 100 MG tablet TAKE ONE TABLET BY MOUTH EVERY DAY 90 tablet 3    teriparatide 20 mcg/dose (620mcg/2.48mL) PnIj Inject 20 mcg into the skin once daily. Discard remainder after 28 days of use. (Patient not taking: Reported on 7/24/2024) 2.48 mL 2    torsemide (DEMADEX) 20 MG Tab Take 1 tablet (20 mg total) by mouth once daily. 90 tablet 3     No current facility-administered medications for this visit.       REVIEW OF SYSTEMS:    GENERAL:  No weight loss, malaise or fevers.  HEENT:  Negative for frequent or significant headaches.  NECK:  Negative for lumps, goiter, pain and significant neck swelling.  RESPIRATORY:  Negative for cough, wheezing or shortness of breath.  CARDIOVASCULAR:  Negative for chest pain, leg swelling or palpitations. HTN  GI:  Negative for abdominal discomfort, blood in stools or black stools or change in bowel habits.  MUSCULOSKELETAL:  See HPI.  SKIN:  Negative for lesions, rash, and itching.  ENDO: Diabetes  PSYCH:  Negative for sleep disturbance, mood disorder and recent psychosocial stressors.  HEMATOLOGY/LYMPHOLOGY:  Negative for prolonged bleeding, bruising easily or swollen nodes.  NEURO:   No history of headaches, syncope, paralysis, seizures or tremors.  All other reviewed and  negative other than HPI.    Past Medical History:  Past Medical History:   Diagnosis Date    Arthritis     Back pain     Cancer     ovarian    Cervical cancer     Coronary artery disease     Depression     Diabetes mellitus     Fibromyalgia     Heart attack     History of MI (myocardial infarction)     Hyperlipidemia     Hypertension     Lupus     Stroke     slight left sided weakness       Past Surgical History:  Past Surgical History:   Procedure Laterality Date    APPENDECTOMY       SECTION      2    CORONARY ANGIOGRAPHY N/A 2022    Procedure: ANGIOGRAM, CORONARY ARTERY;  Surgeon: Jose L Méndez MD;  Location: [facility] CATH LAB;  Service: Cardiology;  Laterality: N/A;    HYSTERECTOMY      with USO for cervical cancer    INJECTION OF ANESTHETIC AGENT AROUND NERVE Bilateral 2021    Procedure: BLOCK, NERVE, SYMPATHIC;  Surgeon: Holden Pereira MD;  Location: [facility] PAIN MGT;  Service: Pain Management;  Laterality: Bilateral;    INJECTION OF ANESTHETIC AGENT AROUND NERVE N/A 2021    Procedure: BLOCK, NERVE, SYMPATHETIC  need consent;  Surgeon: Holden Pereira MD;  Location: [facility] PAIN MGT;  Service: Pain Management;  Laterality: N/A;    YARED LINK,SWALLOW FUNCTION,CINE/VIDEO RECORD  2021         TONSILLECTOMY      TRIAL OF SPINAL CORD NERVE STIMULATOR N/A 3/8/2023    Procedure: LUMBAR SPINAL CORD STIMULATOR TRIAL NEVRO REP PATIENT STATES SHE NO LONGER TAKES PLAVIX;  Surgeon: Holden Pereira MD;  Location: [facility] PAIN MGT;  Service: Pain Management;  Laterality: N/A;       Family History:  Family History   Problem Relation Name Age of Onset    COPD Mother      Lupus Mother      Hernia Mother      Uterine cancer Mother          vs cervical cancer    Ovarian cancer Mother      Diabetes Father      Coronary artery disease Father      Colon cancer Maternal Grandmother          in her 50's       Social History:  Social History     Socioeconomic History    Marital status:    Tobacco Use     Smoking status: Former     Current packs/day: 0.00     Types: Cigarettes     Quit date: 11/2020     Years since quitting: 3.8     Passive exposure: Past    Smokeless tobacco: Never   Substance and Sexual Activity    Alcohol use: Not Currently     Comment: occasionally    Drug use: Yes     Types: Hydrocodone     Comment: three times a day    Sexual activity: Not Currently   Social History Narrative    Lives with daughter. .      Social Determinants of Health     Financial Resource Strain: Low Risk  (7/20/2024)    Overall Financial Resource Strain (CARDIA)     Difficulty of Paying Living Expenses: Not hard at all   Food Insecurity: No Food Insecurity (7/20/2024)    Hunger Vital Sign     Worried About Running Out of Food in the Last Year: Never true     Ran Out of Food in the Last Year: Never true   Transportation Needs: No Transportation Needs (7/20/2024)    TRANSPORTATION NEEDS     Transportation : No   Physical Activity: Inactive (7/20/2024)    Exercise Vital Sign     Days of Exercise per Week: 0 days     Minutes of Exercise per Session: 0 min   Stress: No Stress Concern Present (7/20/2024)    [employer] - Occupational Stress Questionnaire     Feeling of Stress : Not at all   Housing Stability: Low Risk  (7/20/2024)    Housing Stability Vital Sign     Unable to Pay for Housing in the Last Year: No     Homeless in the Last Year: No       OBJECTIVE:    BP (!) 95/58   Pulse 89   Temp 98.8 °F (37.1 °C)   Resp 18   Wt 93.7 kg (206 lb 9.1 oz)   SpO2 98%   BMI 40.34 kg/m²     PHYSICAL EXAMINATION:     General appearance: Well appearing, in no acute distress, alert and oriented x3.  Psych:  Mood and affect appropriate.  Skin: Skin color, texture, turgor normal, no rashes or lesions, in both upper and lower body.  Head/face:  Atraumatic, normocephalic.  Pulm: Symmetric chest rise, no respiratory distress noted.   Back: Straight leg raise in the sitting position is positive for  radicular pain. There is pain with palpation over lumbar facet joints. Limited ROM with pain on flexion and extension.   Musculoskeletal: There is pain with palpation over bilateral SI joints. There is pain with palpation over medial and lateral joint line of left knee. 5/5 strength in right ankle with plantar and dorsiflexion. 5/5 strength in left ankle with plantar and dorsiflexion. 4/5 strength with right knee flexion and extension. 4/5 strength with left knee flexion and extension.   Neuro:  Decreased sensation to light touch to BLE.   Gait: Antalgic- ambulates with wheelchair.     ASSESSMENT: 79 y.o. year old female with chronic pain, consistent with:     1. Chronic pain disorder        2. Painful diabetic neuropathy        3. Lumbar radiculopathy, chronic  X-Ray Lumbar Complete Including Flex And Ext    MRI Lumbar Spine Without Contrast      4. Lumbar spondylosis        5. DDD (degenerative disc disease), lumbar            PLAN:     - Previous imaging reviewed. Labs reviewed.     - Obtain updated lumber imaging.     - Schedule for Qutenza patch application.     - Continue Robaxin 500 mg TID PRN. Refill provided.     - Continue Lyrica 150 mg TID. Refill provided.     - Continue Norco 10/325 mg TID PRN, #90, refill provided.      - The patient is here today for a refill of current pain medications and they believe these provide effective pain control and improvements in quality of life.  The patient notes no serious side effects, and feels the benefits outweigh the risks.  The patient was reminded of the pain contract that they signed previously as well as the risks and benefits of the medication including possible death.  The updated [place] [employer] prescription monitoring program was reviewed, and the patient has been found to be compliant with current treatment plan.     - UDS from 12/14/2023 reviewed and consistent.  Repeat UDS next visit as she was out of medication.     - RTC after imaging.       The above plan and management options were discussed at length with patient. Patient is in agreement with the above and verbalized understanding.    Elvira Hylton NP  09/03/2024

## 2024-09-09 ENCOUNTER — TELEPHONE (OUTPATIENT)
Dept: INTERNAL MEDICINE | Facility: CLINIC | Age: 79
End: 2024-09-09
Payer: MEDICARE

## 2024-09-09 DIAGNOSIS — M54.15 RADICULOPATHY OF THORACOLUMBAR REGION: Primary | ICD-10-CM

## 2024-09-09 DIAGNOSIS — Z86.73 HISTORY OF CVA (CEREBROVASCULAR ACCIDENT): ICD-10-CM

## 2024-09-09 DIAGNOSIS — J42 CHRONIC BRONCHITIS, UNSPECIFIED CHRONIC BRONCHITIS TYPE: ICD-10-CM

## 2024-09-09 DIAGNOSIS — J96.02 ACUTE RESPIRATORY FAILURE WITH HYPOXIA AND HYPERCAPNIA: ICD-10-CM

## 2024-09-09 DIAGNOSIS — J96.01 ACUTE RESPIRATORY FAILURE WITH HYPOXIA AND HYPERCAPNIA: ICD-10-CM

## 2024-09-09 NOTE — TELEPHONE ENCOUNTER
----- Message from Valeria Yanez sent at 9/6/2024  1:39 PM CDT -----  Regarding: Patient Advice                Name of Who is Calling:  CAYLA GOODEN    Who Left The Message:  CAYLA GOODEN      What is the request in detail:     Patient is requesting to extend her at home physical therapy.  Please give the patient called back at your earliest convenience and further advise.    Thank you       Can the clinic reply by [facility]: NO       Preferred Call Back  :  [phone] ([phone])

## 2024-09-10 ENCOUNTER — HOSPITAL ENCOUNTER (OUTPATIENT)
Dept: RADIOLOGY | Facility: OTHER | Age: 79
Discharge: HOME | End: 2024-09-10
Attending: NURSE PRACTITIONER
Payer: MEDICARE

## 2024-09-10 ENCOUNTER — TELEPHONE (OUTPATIENT)
Dept: INTERNAL MEDICINE | Facility: CLINIC | Age: 79
End: 2024-09-10
Payer: MEDICARE

## 2024-09-10 DIAGNOSIS — M54.16 LUMBAR RADICULOPATHY, CHRONIC: ICD-10-CM

## 2024-09-10 PROCEDURE — 72148 MRI LUMBAR SPINE W/O DYE: CPT | Mod: TC

## 2024-09-10 PROCEDURE — 72148 MRI LUMBAR SPINE W/O DYE: CPT | Mod: 26,,, | Performed by: RADIOLOGY

## 2024-09-10 NOTE — TELEPHONE ENCOUNTER
----- Message from Baron Black sent at 9/10/2024  9:48 AM CDT -----  Regarding: Orders  Name of Who is Calling:  Anne calling from [facility]          What is the request in detail: Please contact Anne she need to speak with Dr. Zapata about a referral that was sent             Can the clinic reply by [facility]: No            What Number to Call Back if not in [facility]: [phone] Ext

## 2024-09-16 ENCOUNTER — TELEPHONE (OUTPATIENT)
Dept: PAIN MEDICINE | Facility: CLINIC | Age: 79
End: 2024-09-16
Payer: MEDICARE

## 2024-09-16 DIAGNOSIS — E11.40 PAINFUL DIABETIC NEUROPATHY: Primary | ICD-10-CM

## 2024-09-17 ENCOUNTER — EXTERNAL HOME HEALTH (OUTPATIENT)
Dept: HOME HEALTH SERVICES | Facility: HOSPITAL | Age: 79
End: 2024-09-17
Payer: MEDICARE

## 2024-09-19 ENCOUNTER — PATIENT MESSAGE (OUTPATIENT)
Dept: PAIN MEDICINE | Facility: OTHER | Age: 79
End: 2024-09-19
Payer: MEDICARE

## 2024-09-19 ENCOUNTER — OFFICE VISIT (OUTPATIENT)
Dept: PAIN MEDICINE | Facility: CLINIC | Age: 79
End: 2024-09-19
Attending: ANESTHESIOLOGY
Payer: MEDICARE

## 2024-09-19 VITALS
DIASTOLIC BLOOD PRESSURE: 71 MMHG | WEIGHT: 206.56 LBS | BODY MASS INDEX: 40.34 KG/M2 | TEMPERATURE: 98 F | HEART RATE: 84 BPM | OXYGEN SATURATION: 99 % | SYSTOLIC BLOOD PRESSURE: 141 MMHG

## 2024-09-19 DIAGNOSIS — G89.4 CHRONIC PAIN DISORDER: ICD-10-CM

## 2024-09-19 DIAGNOSIS — M47.816 LUMBAR SPONDYLOSIS: Primary | ICD-10-CM

## 2024-09-19 DIAGNOSIS — Z79.891 OPIATE ANALGESIC USE AGREEMENT EXISTS: ICD-10-CM

## 2024-09-19 DIAGNOSIS — M51.36 DDD (DEGENERATIVE DISC DISEASE), LUMBAR: ICD-10-CM

## 2024-09-19 DIAGNOSIS — M17.11 PRIMARY OSTEOARTHRITIS OF RIGHT KNEE: ICD-10-CM

## 2024-09-19 DIAGNOSIS — M17.12 PRIMARY OSTEOARTHRITIS OF LEFT KNEE: ICD-10-CM

## 2024-09-19 PROCEDURE — 99213 OFFICE O/P EST LOW 20 MIN: CPT | Mod: PBBFAC | Performed by: ANESTHESIOLOGY

## 2024-09-19 PROCEDURE — 80355 GABAPENTIN NON-BLOOD: CPT | Performed by: STUDENT IN AN ORGANIZED HEALTH CARE EDUCATION/TRAINING PROGRAM

## 2024-09-19 PROCEDURE — 80364 OPIOID &OPIATE ANALOG 5/MORE: CPT | Performed by: STUDENT IN AN ORGANIZED HEALTH CARE EDUCATION/TRAINING PROGRAM

## 2024-09-19 PROCEDURE — 80326 AMPHETAMINES 5 OR MORE: CPT | Performed by: STUDENT IN AN ORGANIZED HEALTH CARE EDUCATION/TRAINING PROGRAM

## 2024-09-19 PROCEDURE — 99214 OFFICE O/P EST MOD 30 MIN: CPT | Mod: S$PBB,GC,, | Performed by: ANESTHESIOLOGY

## 2024-09-19 PROCEDURE — 99999 PR PBB SHADOW E&M-EST. PATIENT-LVL III: CPT | Mod: PBBFAC,,, | Performed by: ANESTHESIOLOGY

## 2024-09-19 NOTE — PROGRESS NOTES
Chronic Pain Established Patient       PCP: Chun Zapata MD      INTERVAL HISTORY 9/19/24:   Patient returns to clinic today for follow up of low back pain. Left worse than right. She continues to take her medication as prescribed: Robaxin 500mg TID PRN, Lyrica 150mg TID, Norco 10 TID PRN. She will return to clinic this coming Tuesday for Qutenza patch for PDN. Patient seen by BRAN Felix on 9/3/24 and was sent for new lumbar MRI, which she completed on 9/10/24, results below. Her back pain is aching, throbbing, and stabbing. Rated as a 10/10. Pain is worse with standing, bending forward, and leaning back. Improved with resting. Denies red flag symptoms.     Patient also endorses bilateral knee pain. She has received bilateral knee steroid injections and inquires about repeating those (L knee 3/28/24, R knee 4/18/24). She utilizes a cane to ambulate. L knee is worse than R knee. Pain focused to the lateral knees.        Interval History 9/3/2024:  The patient returns to clinic today for follow up of pain. Since last visit, she was admitted for CHF exacerbation. She spent a few weeks in inpatient rehab. She is currently participating in home health PT. She reports worsened low back pain that radiates into the posterolateral aspect of both legs to her feet, left grater than right. Her pain is worse with walking, and getting in/out of a vehicle. She continues to report left knee pain. She does have benefit with Qutenza patches and needs to schedule the next application. She is taking Lyrica and Robaxin. She also takes Norco as needed for severe pain with benefit. She denies any adverse effects. She has been out of medication since Sunday. Her pain today is 10/10.     Interval History 5/10/2024:  The patient returns to clinic today for follow up of pain. She is s/p left knee steroid injection on 3/28/2024.  She reports limited relief. She is s/p right knee steroid injection on 4/18/2024. She reports significant relief of her right knee pain. She continues to report neuropathic pain into her bilateral feet. She describes this pain as burning and tingling in nature. She does have benefit with Qutenza patches. She is taking Lyrica. She also takes Norco as needed with benefit. She denies any adverse effects. She denies any other health changes. Her pain today is 9/10.     Interval History 3/14/2024:  Indira Waters returns today for f/u of her painful neuropathy in the feet. She was last seen in January for this complaint. Qutenza patches were administered at that time. We also continued medication management with Robaxin, Lyrica, and Norco. She reports incredible relief of neuropathic symptoms with qutenza injections. Significantly improves walking ability. Primary complaint today is left knee pain. Started several years ago. Has been mostly intermittent over the years, but has been more constant and severe over the past 2 months. Localized over medial joint line. No falls. Pain today is an 8/10. Continues Norco 10/325 mg TID, consistently. No adverse effects. Feels like medications continue to help with functional mobility and ADL ability..      Interval History 1/26/2024:  The patient returns to clinic today for foot pain. She continues to report neuropathic pain into her feet. She describes this as burning in nature. She does find benefit with Qutenza patches. She denies any rash or increased pain after the patch application. She continues to take Lyrica, Robaxin, and Norco with benefit. She denies any adverse effects. She denies any other health changes. Her pain today is 8/10.     Interval History 12/14/2023:  The patient returns to clinic today for follow up of back and leg pain. She continues to report low back pain that radiates into both legs. She continues to report bilateral neuropathic pain into the  feet. She states that today is a bad day. She is having more left foot and toe pain. Her pain is worse with prolonged standing and walking. She does have benefit with Qutenza patches. She is taking Lyrica, Robaxin, and Norco with benefit and without adverse effects. She denies other health changes. Her pain today is 8/10.     Interval History 10/11/2023:  The patient returns to clinic today for follow up of bilateral foot pain. She continues to report neuropathic pain to her feet. Her pain is worse with standing and walking. She does have benefit with Qutenza patches. She also takes Lyrica, Robaxin, and Norco. She denies any adverse effects. She denies any other health changes. Her pain today is 8/10.     Interval History 9/15/2023:  The patient returns to clinic today for follow up of back and leg pain. She did have benefit with Qutenza patches. She continues to report low back pain with intermittent radicular leg pain. She continues to report neuropathic pain into her feet. She continues to take Lyrica and Robaxin with benefit. She also takes Norco as needed with benefit. She denies any adverse effects. She denies any other health changes. Her pain today is 9/10.     Interval History 6/27/2023:  The patient returns to clinic today for follow up of pain. She continues to report nerve pain to her feet bilaterally. This pain is worse with standing and walking. She is taking Lyrica and Robaxin. She also takes Norco as needed with benefit and without adverse effects. She denies any other health changes. Her pain today is 9/10.     Interval History 6/16/2023:  The patient returns to clinic today for follow up of back and leg pain. She continues to report low back pain. She also reports diabetic neuropathy to her bilateral feet. Her pain is worse with standing and walking. She has tried Qutenza patches with benefit in the past. She continues to take Robaxin and Lyrica. She also takes Norco as needed with benefit and  without adverse effects. She denies any other health changes. Her pain today is 10/10.     Interval History 3/17/2023:  Mrs Waters presents for follow up of chronic, continuous neuropathic pain to [facility]. She is s/p Nevro SCS trial and unfortunately she did not get the relief she was hoping for and 50% at best relief but this was not consistent. She has no constitutional s/s concerning for infection and denies newer neurological changes since trial. She continues with medication mgt and states Qutenza application has been providing significant benefit.      Interval History 2/10/2023:  Mrs Waters presents for follow up of chronic lower back painn and radicular pain in conjunction with PDN to bilateral feet. She is doing fair with medication mgt of Norco, Robaxin, and Lyrica and does need refill at this time. She denies SE of medications. She is also here for Qutenza application today. She is scheduled to have upcoming SCS trial with Nevro to aslo address her PDN.      Interval History 12/13/2022:  Mrs Waters presents for follow up of chronic pain. She is ready to repeat Qutenza application as it has been >91 days and she had significant improvement of symptoms of PDN. She recently had repeat A1C and just above 10.0 but is decreasing. She continues to take medication with benefit and denies SE of medication. Medication regimen include Norco 10/325mg TID, Robaxin 500mg and Lyrica 150mg.      Interval History 10/12/2022:  Mrs Waters presents for follow up of chronic neuropathy. She is s/p qutenza patch placement with improved functioning and neuropathy control. Unfortunately her A1C was above 11 which inhibits her from Nevro SCS trial at this time. She is eager to have SCS trial. She denies new areas of pain or neurological changes. She continued to take  Norco 10/325mg TID, Robaxin 500mg and Lyrica 150mg TID with benefit and denies significant SE of medications.      Interval History 9/8/2022:  Mrs Waters presents for follow  up of chronic lower back painn and radicular pain in conjunction with PDN to bilateral feet. She is doing fair with medication mgt of Norco, Robaxin, and Lyrica and does need refill at this time. She denies SE of medications. She is patiently awaiting her A1C to become lower than 10 to proceed with SCS trial.      Interval History 8/12/2022:  Mrs Waters presents for delayed FU. She has had continuous pain to lumbar and radicular pain but also peripheral neuropathy complaints. Over interval she has had CVA but doing fair. Her A1C has unfortunately elevated above 10 at this time and SCS trial placed on hold but phychiatric evaluation complete. She continues to take Norco 10/325mg TID, Robaxin 500mg TID and Lyrica 150mg TID with some benefit and denies SE of medications. No s/s concerning for cauda equina.      Interval History 4/12/2022:  Patient returns to clinic today for follow-up. Her neuropathic pain continues to be an issue for her, but she says that she is able to manage. She is taking Norco 10-325mg TID, Robaxin 500mg TID, and Lyrica 150mg TID without any adverse side effects. She denies any changes in her symptoms. She would like to proceed with SCS trial. Discussed last time that her HbA1c should be <10, was 9.8 on most recent labs. Seen by psychology and cleared for SCS.      Interval History 2/14/2022:  Mrs Waters presents for follow up. Pt states overall neuropathy continues. Since last visit she has been stable with medication mgt and takign Norco 10/325mg TID, Robaxin 500mg TID and Lyrica 150mg TID. She denies new areas of pain or neurological changes. Medication adequate to control pain without adverse SE.      Interval History 12/21/2021:  Pt presents for urgent evaluation s/p fall in kitchen last night. Pt unsure of LOC or head trauma but states some amnesia of events. Pt is having left knee pain, B ankle pain L>R and lower back/buttock pain. Daughter further states tremor like activity last night.  During visit daughter asked for bedside commode due to inability to ambulate.  Pt during visit appeared somnolent, pale and began vomiting. Discussed going to ER for further evaluation.      Interval History 12/14/2021:  Mrs Waters presents for follow up of chronic pain complaints. She is hopeful she will have A1C lower soon to move forward with SCS. She is doing fair with medication mgt alone at this time and denies SE of medications. Pt is currently taking Norco 10/325mg TID PRN, Lyrica 150mg TID, and Robaxin 500mg TID. She denies new areas of pain or neurological changes.      Interval History 10/14/2021:  The Pt presents for follow up and s/p B Lumbar sympathetic blocks. Pt states this has done well but ready to proceed with SCS trial. She is aware A1C must be under tight control and Pt and daughter re-iterate knowing this. Pt also mentions DKA admission and diagnosis of vasculitis as well over interval. Pt continues to take hydrocodone 10/325 mg TID PRN, Lyrica 150 mg TID, and Robaxin 500mg TID. She denies any SE of medication, denies new neurological changes.      Interval Hx 09/16/2021  Indira Waters presents to the clinic for a follow-up appointment for f/u after bilateral lumbar sympathetic block on 8/25/21.Patient reports >50% relief after lumbar sympathetic block that lasted 2 weeks when she then developed a UTI/DKA, was admitted to the hospital and pain recurred.  Current pain intensity is 8/10.     Interval History 8/12/2021:  Indira Waters presents to the clinic for a follow-up appointment for BLE diabetic neuropathy, painful. Continues with Norco 10/325mg, Lyrica 150mg TID, Robaxin 750mg daily PRN. She had to cancel previously scheduled lumbar sympathetic block 2/2 lithotripsy for painful kidney stones, which have now passed. She still has intermittent pain with urination but overall that pain is improving. She had to cancel previous angiogram for this same reason - this has not been  "rescheduled yet. She denies any change in her LE pain. Denies any new neurological sxs in BLEs.      Interval History 6/10/21:  Indira Waters presents to the clinic for a 2 week follow-up appointment from lumbar sympathetic nerve block in early May. She reports minimal relief from this intervention, but did note that it helped some, briefly. Since the last visit, Indira Waters states the pain has been persistant. Current pain intensity is 10/10. Patient has chronic generalized diffuse pain, most pronounced in her BL lower extremities 2/2 diabetic neuropathy. HSe states that the pain is a little better than at her last visit. Most days are 10/10, but some days are better than others. Her pain is aggravated by exertion, walking, or sitting/standing for extended periods of time. He pain is mildly improved by rest and medications. She is currently taking Lyrica 150 PO TID, Zoloft, and Norco 10-325mg TID PRN. She states that the pain meds are helping but she is still constantly in pain and unable to participate in her ADLs. She has now established care with PCP for assistance w/ poorly controlled T2DM, last A1c 11.4. She has not yet established care w/ Rheumatology for fibromyalgia management.     Interval HPI 4/15/21:  Patient returns for follow up of chronic generalized diffuse pain. At the last visit, had EMG (not yet completed), lumbar and hip x-ray imaging ordered. She was also referred to Rheumatology for fibromyalgia management and referral to PCP for DM2 management and weaning of zoloft for transition to cymbalta for treatment of neuropathy. She had her Lyrica increased to 150mg PO TID, and prescribed Norco 10mg TID with opioid contract signed. She has been taking Norco 10mg TID and it has been helping her "bad" pains but does not take all of her pain away. She has not yet been set up with her new PCP or rheumatologist yet for fibromyalgia. Still continues to have generalized pain everywhere including feet, hips, " legs, lower and upper back, neck and shoulder pain. Continues to have neuropathy in the bilateral lower extremities due to uncontrolled DM2.     Initial Visit 3/11/21:  Indira Waters presents to the clinic for the evaluation of chronic pain. Complaining of pain everywhere including feet, legs, hips, lower and upper back, neck, shoulder. Pain started 5+ years ago. Pain 10/10 at worse. She was referred here by her PCP Dr. Ramos who she has been seeing for over 6 years. She states her pain is aggravated by any physical activity/movement. She takes lyrica, robaxin, and Norco 10 TID with mild relief of pain. Per patient, she was referred here by her PCP for medication refill. She has not tried physical therapy for several years, she states it did not help last time she was in PT. She says she is trying to exercise daily by doing yoga. She walks with a walker. She has numerous comorbidities including DM2 (Last A1C 9+ per prior family medicine note in Jan 2021), fibromyalgia, lupus, DDD. She states she would like to find a new PCP as she no longer lives near her old one. Patient denies night fever/night sweats, urinary incontinence, bowel incontinence and significant weight loss.        9/19/2024    10:18 AM   Last 3 PDI Scores   Pain Disability Index (PDI) 56       Pain Medications:  Norco 10-325mg TID, Robaxin 500mg TID, and Lyrica 150mg TID     Opioid Contract: yes      report:  Reviewed and consistent with medication use as prescribed.     Pain Procedures:    - L knee injection under US - 3/28/24  - R knee inj under US - 4/18/24   - SCS trial 3/8/23  - reports possible back injection 10+ years ago  - Lumbar sympathetic nerve block 05/05/21 - Dr. Pereira - minimal relief     Physical Therapy/Home Exercise: no     Imaging:   MRI LUMBAR SPINE WITHOUT CONTRAST     CLINICAL HISTORY:  Lumbar radiculopathy, symptoms persist with conservative treatment; Radiculopathy, lumbar region     TECHNIQUE:  Multiplanar,  multisequence MR images were acquired from the thoracolumbar junction to the sacrum without contrast.     COMPARISON:  Radiographs 09/03/2024     FINDINGS:  Alignment: Normal.     Vertebrae: No fracture or marrow infiltrative process.     Discs: Disc heights are maintained.     Cord: Conus terminates at L1 and appears unremarkable.  Cauda equina appears unremarkable.     Degenerative findings:     T12-S1: No spinal canal stenosis or neuroforaminal narrowing.     Paraspinal muscles & soft tissues: Moderate paraspinal muscle atrophy.  Prominent right extrarenal pelvis.  Several small left-sided renal cysts.     Impression:     1. No significant degenerative changes.  No spinal canal stenosis or neural foraminal narrowing.  2. Moderate paraspinal muscle atrophy.  3. Prominent right extrarenal pelvis.  Consider further evaluation with retroperitoneal ultrasound.    Hip X-ray 4/7/21  FINDINGS:  Osseous structures appear intact without evidence of fracture or osseous destructive process.  No apparent dislocation.     Modest degenerative change or significant joint space narrowing.     Lumbar X-ray 4/7/21  FINDINGS:  Diffuse bony demineralization.  Vertebral bodies are normal in height without evidence of fracture.  No pars defects.     Normal sagittal alignment is preserved.  No spondylolisthesis. No abnormal translation with flexion and extension.     Intervertebral disc heights are well maintained.  Mild facet arthropathy in the lower lumbar spine.     Mild scattered vascular calcification.     Impression:     No evidence of fracture or malalignment.     Mild facet arthropathy in the lower lumbar spine.     Diffuse bony demineralization.  Consider correlation with DEXA         Past Medical History:   Diagnosis Date    Arthritis     Back pain     Cancer     ovarian    Cervical cancer     Coronary artery disease     Depression     Diabetes mellitus     Fibromyalgia     Heart attack     History of MI (myocardial  infarction)     Hyperlipidemia     Hypertension     Lupus     Stroke     slight left sided weakness     Past Surgical History:   Procedure Laterality Date    APPENDECTOMY       SECTION      2    CORONARY ANGIOGRAPHY N/A 2022    Procedure: ANGIOGRAM, CORONARY ARTERY;  Surgeon: Jose L Méndez MD;  Location: [facility] CATH LAB;  Service: Cardiology;  Laterality: N/A;    HYSTERECTOMY      with USO for cervical cancer    INJECTION OF ANESTHETIC AGENT AROUND NERVE Bilateral 2021    Procedure: BLOCK, NERVE, SYMPATHIC;  Surgeon: Holden Pereira MD;  Location: [facility] PAIN MGT;  Service: Pain Management;  Laterality: Bilateral;    INJECTION OF ANESTHETIC AGENT AROUND NERVE N/A 2021    Procedure: BLOCK, NERVE, SYMPATHETIC  need consent;  Surgeon: Holden Pereira MD;  Location: [facility] PAIN MGT;  Service: Pain Management;  Laterality: N/A;    ME EVAL,SWALLOW FUNCTION,CINE/VIDEO RECORD  2021         TONSILLECTOMY      TRIAL OF SPINAL CORD NERVE STIMULATOR N/A 3/8/2023    Procedure: LUMBAR SPINAL CORD STIMULATOR TRIAL NEVRO REP PATIENT STATES SHE NO LONGER TAKES PLAVIX;  Surgeon: Holden Pereira MD;  Location: [facility] PAIN MGT;  Service: Pain Management;  Laterality: N/A;     Social History     Socioeconomic History    Marital status:    Tobacco Use    Smoking status: Former     Current packs/day: 0.00     Types: Cigarettes     Quit date: 2020     Years since quitting: 3.8     Passive exposure: Past    Smokeless tobacco: Never   Substance and Sexual Activity    Alcohol use: Not Currently     Comment: occasionally    Drug use: Yes     Types: Hydrocodone     Comment: three times a day    Sexual activity: Not Currently   Social History Narrative    Lives with daughter. .      Social Determinants of Health     Financial Resource Strain: Low Risk  (2024)    Overall Financial Resource Strain (CARDIA)     Difficulty of Paying Living Expenses: Not hard at all   Food Insecurity: No Food  Insecurity (7/20/2024)    Hunger Vital Sign     Worried About Running Out of Food in the Last Year: Never true     Ran Out of Food in the Last Year: Never true   Transportation Needs: No Transportation Needs (7/20/2024)    TRANSPORTATION NEEDS     Transportation : No   Physical Activity: Inactive (7/20/2024)    Exercise Vital Sign     Days of Exercise per Week: 0 days     Minutes of Exercise per Session: 0 min   Stress: No Stress Concern Present (7/20/2024)    [facility] - Occupational Stress Questionnaire     Feeling of Stress : Not at all   Housing Stability: Low Risk  (7/20/2024)    Housing Stability Vital Sign     Unable to Pay for Housing in the Last Year: No     Homeless in the Last Year: No     Family History   Problem Relation Name Age of Onset    COPD Mother      Lupus Mother      Hernia Mother      Uterine cancer Mother          vs cervical cancer    Ovarian cancer Mother      Diabetes Father      Coronary artery disease Father      Colon cancer Maternal Grandmother          in her 50's       Review of patient's allergies indicates:   Allergen Reactions    Bleach (sodium hypochlorite) Shortness Of Breath    Nitrofurantoin macrocrystalline Anaphylaxis    Lipitor [atorvastatin] Diarrhea and Rash    Nsaids (non-steroidal anti-inflammatory drug)      Tolerates aspirin      Pcn [penicillins]     Statins-hmg-coa reductase inhibitors     Toradol [ketorolac]        Current Outpatient Medications   Medication Sig    acetaminophen (TYLENOL) 500 MG tablet Take 2 tablets (1,000 mg total) by mouth every 8 (eight) hours as needed for Pain.    albuterol (ACCUNEB) 1.25 mg/3 mL Nebu Take 3 mLs (1.25 mg total) by nebulization every 6 (six) hours as needed (for wheezing or shortness of breath). Rescue    allopurinoL (ZYLOPRIM) 300 MG tablet Take 1 tablet (300 mg total) by mouth once daily.    aspirin (ECOTRIN) 81 MG EC tablet Take 1 tablet (81 mg total) by mouth once daily.    blood sugar  diagnostic Strp To check BG 6 times daily, to use with insurance preferred meter    blood-glucose meter kit To check BG 6 times daily, to use with insurance preferred meter    evolocumab (REPATHA SURECLICK) 140 mg/mL PnIj Inject 1 mL (140 mg total) into the skin every 14 (fourteen) days.    HYDROcodone-acetaminophen (NORCO)  mg per tablet Take 1 tablet by mouth every 8 (eight) hours as needed for Pain.    insulin glargine U-100, Lantus, (LANTUS SOLOSTAR U-100 INSULIN) 100 unit/mL (3 mL) InPn pen Take 20 units in the AM and 26 units in the PM    insulin lispro (HUMALOG KWIKPEN INSULIN) 100 unit/mL pen Inject 10 Units into the skin 3 (three) times daily before meals.    ipratropium-albuteroL (COMBIVENT RESPIMAT)  mcg/actuation inhaler INHALE 1 PUFF INTO THE LUNGS BY MOUTH EVERY 6 HOURS    lancets (TRUEPLUS LANCETS) 30 gauge Misc Check blood sugar 4 times daily    losartan (COZAAR) 50 MG tablet Take 1 tablet (50 mg total) by mouth once daily.    melatonin (MELATIN) 3 mg tablet Take 2 tablets (6 mg total) by mouth nightly as needed for Insomnia.    methocarbamoL (ROBAXIN) 500 MG Tab Take 500 mg by mouth 4 (four) times daily.    methocarbamoL (ROBAXIN) 500 MG Tab Take 1 tablet (500 mg total) by mouth 3 (three) times daily.    metoclopramide HCl (REGLAN) 5 MG tablet TAKE ONE TABLET BY MOUTH FOUR TIMES DAILY AS NEEDED FOR nausea prevention    metoprolol succinate (TOPROL-XL) 100 MG 24 hr tablet Take 1 tablet (100 mg total) by mouth once daily.    miconazole nitrate 2% (MICOTIN) 2 % Oint Apply topically 2 (two) times daily.    NIFEdipine (PROCARDIA-XL) 30 MG (OSM) 24 hr tablet Take 1 tablet (30 mg total) by mouth 2 (two) times a day.    pregabalin (LYRICA) 75 MG capsule Take 1 capsule (75 mg total) by mouth 3 (three) times daily.    senna-docusate 8.6-50 mg (PERICOLACE) 8.6-50 mg per tablet Take 1 tablet by mouth 2 (two) times daily as needed for Constipation.    sertraline (ZOLOFT) 100 MG tablet TAKE ONE TABLET  BY MOUTH EVERY DAY    teriparatide 20 mcg/dose (620mcg/2.48mL) PnIj Inject 20 mcg into the skin once daily. Discard remainder after 28 days of use.    torsemide (DEMADEX) 20 MG Tab Take 1 tablet (20 mg total) by mouth once daily.     No current facility-administered medications for this visit.           ROS:  GENERAL: No fever. No chills. No fatigue. Denies weight loss. Denies weight gain.  HEENT: Denies headaches. Denies vision change. Denies eye pain. Denies double vision. Denies ear pain.   CV: Denies chest pain.   PULM: Denies of shortness of breath.  GI: Denies constipation. No diarrhea. No abdominal pain. Denies nausea. Denies vomiting. No blood in stool.  HEME: Denies bleeding problems.  : Denies urgency. No painful urination. No blood in urine.  MS: Denies joint stiffness. Denies joint swelling.  Denies back pain.  SKIN: Denies rash.   NEURO: Denies seizures. No weakness.  PSYCH:  Denies difficulty sleeping. No anxiety. Denies depression. No suicidal thoughts.       VITALS:   Vitals:    09/19/24 1017   BP: (!) 141/71   Pulse: 84   Temp: 97.7 °F (36.5 °C)   SpO2: 99%   Weight: 93.7 kg (206 lb 9.1 oz)   PainSc:   8   PainLoc: Back         PHYSICAL EXAM:   General appearance: Well appearing, in no acute distress, alert and oriented x3.  Psych:  Mood and affect appropriate.  Skin: Skin color, texture, turgor normal, no rashes or lesions, in both upper and lower body.  Head/face:  Atraumatic, normocephalic.  Pulm: Symmetric chest rise, no respiratory distress noted.   Back: Straight leg raise in the sitting position is positive for radicular pain. There is pain with palpation over lumbar facet joints. Limited ROM with pain on flexion and extension. There is pain with palpation over bilateral SI joints  Musculoskeletal: . positive for bilateral knee limited ROM with tenderness on palpation and crepitus on movement, negative ant and post drawer sign   Swelling noted to bilateral LE.   Neuro:  Decreased sensation  to light touch to BLE.  5/5 strength in right ankle with plantar and dorsiflexion. 5/5 strength in left ankle with plantar and dorsiflexion. 4/5 strength with right knee flexion and extension. 4/5 strength with left knee flexion and extension.   Gait: Antalgic- ambulates with wheelchair/cane.               ASSESSMENT: 79 y.o. year old with low back and knee pain, consistent with:    1. Lumbar spondylosis  Procedure Request Order for Pain Management      2. Chronic pain disorder  Pain Clinic Drug Screen      3. Opiate analgesic use agreement exists  Pain Clinic Drug Screen      4. Primary osteoarthritis of right knee        5. Primary osteoarthritis of left knee        6. DDD (degenerative disc disease), lumbar                  PLAN:    Patient Education:  Discussed the importance of physical therapy, activity modification, and a home exercise plan to help with pain and improve health.  I have personally reviewed her lumbar MRI.   Therapy referral:  continue HEP  Medications:   Patient can continue with pain medications for now per their provider since they are providing benefits, using them appropriately, and without side effects.  Prescribed Robaxin, lyrica, Norco   Continue with Qutenza patch treatments   Repeat UDS today   Schedule pain intervention for: bilateral knee injection under ultrasound. Schedule for bilateral lumbar MBB L3/4/5.     Counseled patient: regarding the importance of activity modification, constant sleeping habits, and physical therapy.  Return to clinic: Tuesday for Qutenza patch and for bilateral knee injection       Allan Jason   I have personally reviewed the history and exam of this patient and agree with the resident/fellow/NPs note as stated above.    Holden Pereira MD    09/19/2024

## 2024-09-20 DIAGNOSIS — K31.84 GASTROPARESIS: ICD-10-CM

## 2024-09-20 DIAGNOSIS — E78.1 HYPERTRIGLYCERIDEMIA: ICD-10-CM

## 2024-09-20 NOTE — TELEPHONE ENCOUNTER
No care due was identified.  Health [facility] Embedded Care Due Messages. Reference number: [id number].   9/20/2024 1:13:35 PM CDT

## 2024-09-21 RX ORDER — FENOFIBRATE 134 MG/1
CAPSULE ORAL
Qty: 90 CAPSULE | Refills: 3 | OUTPATIENT
Start: 2024-09-21

## 2024-09-21 NOTE — TELEPHONE ENCOUNTER
Refill Routing Note   Medication(s) are not appropriate for processing by [facility] for the following reason(s):        Outside of protocol    [facility] action(s):  Quick Discontinue  Route      Medication Therapy Plan: Fenofibrate discontinued on 6/26/2024 by Prasanna Montes MD for the following reason: Stop Taking at Discharge.      Appointments  past 12m or future 3m with PCP    Date Provider   Last Visit   8/23/2024 Chun Zapata MD   Next Visit   12/6/2024 Chun Zapata MD   ED visits in past 90 days: 0        Note composed:7:09 AM 09/21/2024

## 2024-09-23 DIAGNOSIS — E78.1 HYPERTRIGLYCERIDEMIA: ICD-10-CM

## 2024-09-23 PROBLEM — E11.10 DIABETIC KETOACIDOSIS WITHOUT COMA ASSOCIATED WITH TYPE 2 DIABETES MELLITUS: Status: RESOLVED | Noted: 2024-06-21 | Resolved: 2024-09-23

## 2024-09-23 RX ORDER — FENOFIBRATE 134 MG/1
134 CAPSULE ORAL
Qty: 90 CAPSULE | Refills: 3 | Status: SHIPPED | OUTPATIENT
Start: 2024-09-23 | End: 2025-09-23

## 2024-09-23 RX ORDER — FENOFIBRATE 134 MG/1
134 CAPSULE ORAL
Qty: 90 CAPSULE | Refills: 3 | OUTPATIENT
Start: 2024-09-23 | End: 2025-09-23

## 2024-09-23 RX ORDER — METOCLOPRAMIDE 5 MG/1
TABLET ORAL
Qty: 90 TABLET | Refills: 3 | Status: SHIPPED | OUTPATIENT
Start: 2024-09-23

## 2024-09-23 NOTE — TELEPHONE ENCOUNTER
----- Message from Ni Smith MA sent at 9/23/2024 10:50 AM CDT -----  Regarding: Refill Request  Who Called: Esha daughter of CAYLA GOODEN [[id number]]           New Prescription or Refill : Refill      RX Name and Strength:  Fenofibrate 134mg           30 day or 90 day RX: 90           Local or Mail Order : local            Preferred Pharmacy: [address]       Would the patient rather a call back or a response via [facility]? call        Best Call Back Number:  [phone]

## 2024-09-23 NOTE — TELEPHONE ENCOUNTER
No care due was identified.  Health [facility] Embedded Care Due Messages. Reference number: [id number].   9/23/2024 1:23:29 PM CDT

## 2024-09-23 NOTE — TELEPHONE ENCOUNTER
----- Message from Estela Godinez sent at 9/23/2024 12:50 PM CDT -----  Contact: Larissa  Type:  Patient Call          Who Called: Patient daughter Hany Balbuena         Does the patient know what this is regarding?: Requesting a call back about having lab orders for a urine test ; her home health agency was to  take the teat but she didn't have the hat to catch the urine ; pt will be at a office visit on tomorrow so her daughter would like to bring her in then ; please advise           Would the patient rather a call back or a response via [facility]?call         Best Call Back Number: [phone]             Additional Information:

## 2024-09-24 ENCOUNTER — TELEPHONE (OUTPATIENT)
Dept: PAIN MEDICINE | Facility: CLINIC | Age: 79
End: 2024-09-24

## 2024-09-24 ENCOUNTER — LAB VISIT (OUTPATIENT)
Dept: LAB | Facility: OTHER | Age: 79
End: 2024-09-24
Attending: STUDENT IN AN ORGANIZED HEALTH CARE EDUCATION/TRAINING PROGRAM
Payer: MEDICARE

## 2024-09-24 ENCOUNTER — OFFICE VISIT (OUTPATIENT)
Dept: PAIN MEDICINE | Facility: CLINIC | Age: 79
End: 2024-09-24
Payer: MEDICARE

## 2024-09-24 ENCOUNTER — TELEPHONE (OUTPATIENT)
Dept: INTERNAL MEDICINE | Facility: CLINIC | Age: 79
End: 2024-09-24
Payer: MEDICARE

## 2024-09-24 VITALS
DIASTOLIC BLOOD PRESSURE: 68 MMHG | TEMPERATURE: 98 F | HEART RATE: 85 BPM | OXYGEN SATURATION: 99 % | SYSTOLIC BLOOD PRESSURE: 142 MMHG

## 2024-09-24 DIAGNOSIS — E11.40 PAINFUL DIABETIC NEUROPATHY: ICD-10-CM

## 2024-09-24 DIAGNOSIS — M47.816 LUMBAR SPONDYLOSIS: ICD-10-CM

## 2024-09-24 DIAGNOSIS — E11.40 PAINFUL DIABETIC NEUROPATHY: Primary | ICD-10-CM

## 2024-09-24 DIAGNOSIS — Z79.4 TYPE 2 DIABETES MELLITUS WITH CHRONIC KIDNEY DISEASE, WITH LONG-TERM CURRENT USE OF INSULIN, UNSPECIFIED CKD STAGE: Primary | ICD-10-CM

## 2024-09-24 DIAGNOSIS — R30.0 DYSURIA: ICD-10-CM

## 2024-09-24 DIAGNOSIS — M51.36 DDD (DEGENERATIVE DISC DISEASE), LUMBAR: ICD-10-CM

## 2024-09-24 DIAGNOSIS — G89.4 CHRONIC PAIN DISORDER: Primary | ICD-10-CM

## 2024-09-24 DIAGNOSIS — R30.0 DYSURIA: Primary | ICD-10-CM

## 2024-09-24 DIAGNOSIS — E11.22 TYPE 2 DIABETES MELLITUS WITH CHRONIC KIDNEY DISEASE, WITH LONG-TERM CURRENT USE OF INSULIN, UNSPECIFIED CKD STAGE: Primary | ICD-10-CM

## 2024-09-24 DIAGNOSIS — E11.9 TYPE 2 DIABETES MELLITUS WITHOUT COMPLICATION, UNSPECIFIED WHETHER LONG TERM INSULIN USE: ICD-10-CM

## 2024-09-24 LAB
1OH-MIDAZOLAM UR QL SCN: NOT DETECTED
6MAM UR QL: NOT DETECTED
7AMINOCLONAZEPAM UR QL: NOT DETECTED
A-OH ALPRAZ UR QL: NOT DETECTED
ALPRAZ UR QL: NOT DETECTED
AMPHET UR QL SCN: NOT DETECTED
ANNOTATION COMMENT IMP: NORMAL
BARBITURATES UR QL: NEGATIVE
BILIRUB UR QL STRIP: NEGATIVE
BUPRENORPHINE UR QL: NOT DETECTED
BZE UR QL: NEGATIVE
CARBOXYTHC UR QL: NEGATIVE
CARISOPRODOL UR QL: NEGATIVE
CLARITY UR: CLEAR
CLONAZEPAM UR QL: NOT DETECTED
CODEINE UR QL: NOT DETECTED
COLOR UR: COLORLESS
CREAT UR-MCNC: <20 MG/DL (ref 20–400)
DIAZEPAM UR QL: NOT DETECTED
ESTIMATED AVG GLUCOSE: 169 MG/DL (ref 68–131)
ETHYL GLUCURONIDE UR QL: NEGATIVE
FENTANYL UR QL: NOT DETECTED
GABAPENTIN UR QL CFM: NOT DETECTED
GLUCOSE UR QL STRIP: NEGATIVE
HBA1C MFR BLD: 7.5 % (ref 4–5.6)
HGB UR QL STRIP: NEGATIVE
HYDROCODONE UR QL: PRESENT
HYDROMORPHONE UR QL: PRESENT
KETONES UR QL STRIP: NEGATIVE
LEUKOCYTE ESTERASE UR QL STRIP: ABNORMAL
LORAZEPAM UR QL: NOT DETECTED
MDA UR QL: NOT DETECTED
MDEA UR QL: NOT DETECTED
MDMA UR QL: NOT DETECTED
ME-PHENIDATE UR QL: NOT DETECTED
METHADONE UR QL: NEGATIVE
METHAMPHET UR QL: NOT DETECTED
MICROSCOPIC COMMENT: NORMAL
MIDAZOLAM UR QL SCN: NOT DETECTED
MORPHINE UR QL: NOT DETECTED
NALOXONE UR QL CFM: NOT DETECTED
NITRITE UR QL STRIP: NEGATIVE
NORBUPRENORPHINE UR QL CFM: NOT DETECTED
NORDIAZEPAM UR QL: NOT DETECTED
NORFENTANYL UR QL: NOT DETECTED
NORHYDROCODONE UR QL CFM: PRESENT
NORMEPERIDINE UR QL CFM: NOT DETECTED
NOROXYCODONE UR QL CFM: NOT DETECTED
NOROXYMORPHONE UR QL SCN: NOT DETECTED
OXAZEPAM UR QL: NOT DETECTED
OXYCODONE UR QL: NOT DETECTED
OXYMORPHONE UR QL: NOT DETECTED
PATHOLOGY STUDY: NORMAL
PCP UR QL: NEGATIVE
PH UR STRIP: 6 [PH] (ref 5–8)
PHENTERMINE UR QL: NOT DETECTED
PREGABALIN UR QL CFM: PRESENT
PROT UR QL STRIP: NEGATIVE
SERVICE CMNT-IMP: NORMAL
SP GR UR STRIP: 1 (ref 1–1.03)
SQUAMOUS #/AREA URNS HPF: 0 /HPF
TAPENTADOL UR QL SCN: NOT DETECTED
TAPENTADOL UR QL SCN: NOT DETECTED
TEMAZEPAM UR QL: NOT DETECTED
TRAMADOL UR QL: NEGATIVE
URN SPEC COLLECT METH UR: ABNORMAL
UROBILINOGEN UR STRIP-ACNC: NEGATIVE EU/DL
WBC #/AREA URNS HPF: 5 /HPF (ref 0–5)
ZOLPIDEM PHENYL-4-CARB UR QL SCN: NOT DETECTED
ZOLPIDEM UR QL: NOT DETECTED

## 2024-09-24 PROCEDURE — 99999 PR PBB SHADOW E&M-EST. PATIENT-LVL III: CPT | Mod: PBBFAC,,, | Performed by: NURSE PRACTITIONER

## 2024-09-24 PROCEDURE — 99999PBSHW PR PBB SHADOW TECHNICAL ONLY FILED TO HB: Mod: JZ,PBBFAC,,

## 2024-09-24 PROCEDURE — 81000 URINALYSIS NONAUTO W/SCOPE: CPT | Performed by: STUDENT IN AN ORGANIZED HEALTH CARE EDUCATION/TRAINING PROGRAM

## 2024-09-24 PROCEDURE — 82043 UR ALBUMIN QUANTITATIVE: CPT | Performed by: STUDENT IN AN ORGANIZED HEALTH CARE EDUCATION/TRAINING PROGRAM

## 2024-09-24 PROCEDURE — 36415 COLL VENOUS BLD VENIPUNCTURE: CPT | Performed by: STUDENT IN AN ORGANIZED HEALTH CARE EDUCATION/TRAINING PROGRAM

## 2024-09-24 PROCEDURE — 64999 UNLISTED PX NERVOUS SYSTEM: CPT | Mod: PBBFAC | Performed by: NURSE PRACTITIONER

## 2024-09-24 PROCEDURE — 99213 OFFICE O/P EST LOW 20 MIN: CPT | Mod: PBBFAC | Performed by: NURSE PRACTITIONER

## 2024-09-24 PROCEDURE — 82570 ASSAY OF URINE CREATININE: CPT | Performed by: STUDENT IN AN ORGANIZED HEALTH CARE EDUCATION/TRAINING PROGRAM

## 2024-09-24 PROCEDURE — 87086 URINE CULTURE/COLONY COUNT: CPT | Performed by: STUDENT IN AN ORGANIZED HEALTH CARE EDUCATION/TRAINING PROGRAM

## 2024-09-24 PROCEDURE — 83036 HEMOGLOBIN GLYCOSYLATED A1C: CPT | Performed by: STUDENT IN AN ORGANIZED HEALTH CARE EDUCATION/TRAINING PROGRAM

## 2024-09-24 NOTE — TELEPHONE ENCOUNTER
Please try to obtain microalbum from urine already in lab. In-patient lab at [facility].  I talked to someone [phone].  They weren't sure how to help me.    Thank you!!!!!    JL

## 2024-09-24 NOTE — PROGRESS NOTES
Chronic patient Established Note (Follow up visit)        Interval History 9/24/2024:  The patient returns to clinic today for follow up of back and foot pain. She reports worsened neuropathic pain of her bilateral feet. She describes this as burning in nature. Her pain is worse with walking. She also notes swelling into the feet. She does have benefit with Qutenza patches. She denies any adverse effects. She is scheduled for knee injection in November. She is taking Lyrica and Robaxin. She also takes Norco as needed with benefit. She denies any adverse effects. She denies any other health changes. Her pain today is 7/10.    Interval History 9/19/2024:  Patient returns to clinic today for follow up of low back pain. Left worse than right. She continues to take her medication as prescribed: Robaxin 500mg TID PRN, Lyrica 150mg TID, Norco 10 TID PRN. She will return to clinic this coming Tuesday for Qutenza patch for PDN. Patient seen by BRAN Felix on 9/3/24 and was sent for new lumbar MRI, which she completed on 9/10/24, results below. Her back pain is aching, throbbing, and stabbing. Rated as a 10/10. Pain is worse with standing, bending forward, and leaning back. Improved with resting. Denies red flag symptoms.      Patient also endorses bilateral knee pain. She has received bilateral knee steroid injections and inquires about repeating those (L knee 3/28/24, R knee 4/18/24). She utilizes a cane to ambulate. L knee is worse than R knee. Pain focused to the lateral knees.     Interval History 9/3/2024:  The patient returns to clinic today for follow up of pain. Since last visit, she was admitted for CHF exacerbation. She spent a few weeks in inpatient rehab. She is currently participating in home health PT. She reports worsened low back pain that radiates into the posterolateral aspect of both legs to her feet, left grater than right. Her pain is worse with walking, and getting in/out of a vehicle. She continues to  report left knee pain. She does have benefit with Qutenza patches and needs to schedule the next application. She is taking Lyrica and Robaxin. She also takes Norco as needed for severe pain with benefit. She denies any adverse effects. She has been out of medication since Sunday. Her pain today is 10/10.    Interval History 5/10/2024:  The patient returns to clinic today for follow up of pain. She is s/p left knee steroid injection on 3/28/2024. She reports limited relief. She is s/p right knee steroid injection on 4/18/2024. She reports significant relief of her right knee pain. She continues to report neuropathic pain into her bilateral feet. She describes this pain as burning and tingling in nature. She does have benefit with Qutenza patches. She is taking Lyrica. She also takes Norco as needed with benefit. She denies any adverse effects. She denies any other health changes. Her pain today is 9/10.    Interval History 3/14/2024:  Indira Waters returns today for f/u of her painful neuropathy in the feet. She was last seen in January for this complaint. Qutenza patches were administered at that time. We also continued medication management with Robaxin, Lyrica, and Norco. She reports incredible relief of neuropathic symptoms with qutenza injections. Significantly improves walking ability. Primary complaint today is left knee pain. Started several years ago. Has been mostly intermittent over the years, but has been more constant and severe over the past 2 months. Localized over medial joint line. No falls. Pain today is an 8/10. Continues Norco 10/325 mg TID, consistently. No adverse effects. Feels like medications continue to help with functional mobility and ADL ability..     Interval History 1/26/2024:  The patient returns to clinic today for foot pain. She continues to report neuropathic pain into her feet. She describes this as burning in nature. She does find benefit with Qutenza patches. She denies  any rash or increased pain after the patch application. She continues to take Lyrica, Robaxin, and Norco with benefit. She denies any adverse effects. She denies any other health changes. Her pain today is 8/10.    Interval History 12/14/2023:  The patient returns to clinic today for follow up of back and leg pain. She continues to report low back pain that radiates into both legs. She continues to report bilateral neuropathic pain into the feet. She states that today is a bad day. She is having more left foot and toe pain. Her pain is worse with prolonged standing and walking. She does have benefit with Qutenza patches. She is taking Lyrica, Robaxin, and Norco with benefit and without adverse effects. She denies other health changes. Her pain today is 8/10.    Interval History 10/11/2023:  The patient returns to clinic today for follow up of bilateral foot pain. She continues to report neuropathic pain to her feet. Her pain is worse with standing and walking. She does have benefit with Qutenza patches. She also takes Lyrica, Robaxin, and Norco. She denies any adverse effects. She denies any other health changes. Her pain today is 8/10.    Interval History 9/15/2023:  The patient returns to clinic today for follow up of back and leg pain. She did have benefit with Qutenza patches. She continues to report low back pain with intermittent radicular leg pain. She continues to report neuropathic pain into her feet. She continues to take Lyrica and Robaxin with benefit. She also takes Norco as needed with benefit. She denies any adverse effects. She denies any other health changes. Her pain today is 9/10.    Interval History 6/27/2023:  The patient returns to clinic today for follow up of pain. She continues to report nerve pain to her feet bilaterally. This pain is worse with standing and walking. She is taking Lyrica and Robaxin. She also takes Norco as needed with benefit and without adverse effects. She denies any  other health changes. Her pain today is 9/10.    Interval History 6/16/2023:  The patient returns to clinic today for follow up of back and leg pain. She continues to report low back pain. She also reports diabetic neuropathy to her bilateral feet. Her pain is worse with standing and walking. She has tried Qutenza patches with benefit in the past. She continues to take Robaxin and Lyrica. She also takes Norco as needed with benefit and without adverse effects. She denies any other health changes. Her pain today is 10/10.    Interval History 3/17/2023:  Mrs Waters presents for follow up of chronic, continuous neuropathic pain to [facility]. She is s/p Nevro SCS trial and unfortunately she did not get the relief she was hoping for and 50% at best relief but this was not consistent. She has no constitutional s/s concerning for infection and denies newer neurological changes since trial. She continues with medication mgt and states Qutenza application has been providing significant benefit.     Interval History 2/10/2023:  Mrs Waters presents for follow up of chronic lower back painn and radicular pain in conjunction with PDN to bilateral feet. She is doing fair with medication mgt of Norco, Robaxin, and Lyrica and does need refill at this time. She denies SE of medications. She is also here for Qutenza application today. She is scheduled to have upcoming SCS trial with [facility] to aslo address her PDN.     Interval History 12/13/2022:  Mrs Waters presents for follow up of chronic pain. She is ready to repeat Qutenza application as it has been >91 days and she had significant improvement of symptoms of PDN. She recently had repeat A1C and just above 10.0 but is decreasing. She continues to take medication with benefit and denies SE of medication. Medication regimen include Norco 10/325mg TID, Robaxin 500mg and Lyrica 150mg.     Interval History 10/12/2022:  Mrs Waters presents for follow up of chronic neuropathy. She is s/p qutenza  patch placement with improved functioning and neuropathy control. Unfortunately her A1C was above 11 which inhibits her from Nevro SCS trial at this time. She is eager to have SCS trial. She denies new areas of pain or neurological changes. She continued to take  Norco 10/325mg TID, Robaxin 500mg and Lyrica 150mg TID with benefit and denies significant SE of medications.     Interval History 9/8/2022:  Mrs Waters presents for follow up of chronic lower back painn and radicular pain in conjunction with PDN to bilateral feet. She is doing fair with medication mgt of Norco, Robaxin, and Lyrica and does need refill at this time. She denies SE of medications. She is patiently awaiting her A1C to become lower than 10 to proceed with SCS trial.     Interval History 8/12/2022:  Mrs Waters presents for delayed FU. She has had continuous pain to lumbar and radicular pain but also peripheral neuropathy complaints. Over interval she has had CVA but doing fair. Her A1C has unfortunately elevated above 10 at this time and SCS trial placed on hold but phychiatric evaluation complete. She continues to take Norco 10/325mg TID, Robaxin 500mg TID and Lyrica 150mg TID with some benefit and denies SE of medications. No s/s concerning for cauda equina.     Interval History 4/12/2022:  Patient returns to clinic today for follow-up. Her neuropathic pain continues to be an issue for her, but she says that she is able to manage. She is taking Norco 10-325mg TID, Robaxin 500mg TID, and Lyrica 150mg TID without any adverse side effects. She denies any changes in her symptoms. She would like to proceed with SCS trial. Discussed last time that her HbA1c should be <10, was 9.8 on most recent labs. Seen by psychology and cleared for SCS.     Interval History 2/14/2022:  Mrs Waters presents for follow up. Pt states overall neuropathy continues. Since last visit she has been stable with medication mgt and takign Norco 10/325mg TID, Robaxin 500mg TID  and Lyrica 150mg TID. She denies new areas of pain or neurological changes. Medication adequate to control pain without adverse SE.      Interval History 12/21/2021:  Pt presents for urgent evaluation s/p fall in kitchen last night. Pt unsure of LOC or head trauma but states some amnesia of events. Pt is having left knee pain, B ankle pain L>R and lower back/buttock pain. Daughter further states tremor like activity last night. During visit daughter asked for bedside commode due to inability to ambulate.  Pt during visit appeared somnolent, pale and began vomiting. Discussed going to ER for further evaluation.      Interval History 12/14/2021:  Mrs Waters presents for follow up of chronic pain complaints. She is hopeful she will have A1C lower soon to move forward with SCS. She is doing fair with medication mgt alone at this time and denies SE of medications. Pt is currently taking Norco 10/325mg TID PRN, Lyrica 150mg TID, and Robaxin 500mg TID. She denies new areas of pain or neurological changes.      Interval History 10/14/2021:  The Pt presents for follow up and s/p B Lumbar sympathetic blocks. Pt states this has done well but ready to proceed with SCS trial. She is aware A1C must be under tight control and Pt and daughter re-iterate knowing this. Pt also mentions DKA admission and diagnosis of vasculitis as well over interval. Pt continues to take hydrocodone 10/325 mg TID PRN, Lyrica 150 mg TID, and Robaxin 500mg TID. She denies any SE of medication, denies new neurological changes.      Interval Hx 09/16/2021  Indira Waters presents to the clinic for a follow-up appointment for f/u after bilateral lumbar sympathetic block on 8/25/21.Patient reports >50% relief after lumbar sympathetic block that lasted 2 weeks when she then developed a UTI/DKA, was admitted to the hospital and pain recurred.  Current pain intensity is 8/10.     Interval History 8/12/2021:  Indira Waters presents to the clinic for  a follow-up appointment for BLE diabetic neuropathy, painful. Continues with Norco 10/325mg, Lyrica 150mg TID, Robaxin 750mg daily PRN. She had to cancel previously scheduled lumbar sympathetic block 2/2 lithotripsy for painful kidney stones, which have now passed. She still has intermittent pain with urination but overall that pain is improving. She had to cancel previous angiogram for this same reason - this has not been rescheduled yet. She denies any change in her LE pain. Denies any new neurological sxs in BLEs.     Interval History 6/10/21:  Indira Waters presents to the clinic for a 2 week follow-up appointment from lumbar sympathetic nerve block in early May. She reports minimal relief from this intervention, but did note that it helped some, briefly. Since the last visit, Indira Waters states the pain has been persistant. Current pain intensity is 10/10. Patient has chronic generalized diffuse pain, most pronounced in her BL lower extremities 2/2 diabetic neuropathy. HSe states that the pain is a little better than at her last visit. Most days are 10/10, but some days are better than others. Her pain is aggravated by exertion, walking, or sitting/standing for extended periods of time. He pain is mildly improved by rest and medications. She is currently taking Lyrica 150 PO TID, Zoloft, and Norco 10-325mg TID PRN. She states that the pain meds are helping but she is still constantly in pain and unable to participate in her ADLs. She has now established care with PCP for assistance w/ poorly controlled T2DM, last A1c 11.4. She has not yet established care w/ Rheumatology for fibromyalgia management.    Interval HPI 4/15/21:  Patient returns for follow up of chronic generalized diffuse pain. At the last visit, had EMG (not yet completed), lumbar and hip x-ray imaging ordered. She was also referred to Rheumatology for fibromyalgia management and referral to PCP for DM2 management and weaning of zoloft for  "transition to cymbalta for treatment of neuropathy. She had her Lyrica increased to 150mg PO TID, and prescribed Norco 10mg TID with opioid contract signed. She has been taking Norco 10mg TID and it has been helping her "bad" pains but does not take all of her pain away. She has not yet been set up with her new PCP or rheumatologist yet for fibromyalgia. Still continues to have generalized pain everywhere including feet, hips, legs, lower and upper back, neck and shoulder pain. Continues to have neuropathy in the bilateral lower extremities due to uncontrolled DM2.    Initial Visit 3/11/21:  Indira Waters presents to the clinic for the evaluation of chronic pain. Complaining of pain everywhere including feet, legs, hips, lower and upper back, neck, shoulder. Pain started 5+ years ago. Pain 10/10 at worse. She was referred here by her PCP Dr. Ramos who she has been seeing for over 6 years. She states her pain is aggravated by any physical activity/movement. She takes lyrica, robaxin, and Norco 10 TID with mild relief of pain. Per patient, she was referred here by her PCP for medication refill. She has not tried physical therapy for several years, she states it did not help last time she was in PT. She says she is trying to exercise daily by doing yoga. She walks with a walker. She has numerous comorbidities including DM2 (Last A1C 9+ per prior family medicine note in Jan 2021), fibromyalgia, lupus, DDD. She states she would like to find a new PCP as she no longer lives near her old one. Patient denies night fever/night sweats, urinary incontinence, bowel incontinence and significant weight loss.      Pain Disability Index Review:      9/24/2024     1:13 PM 9/19/2024    10:18 AM 5/10/2024     2:47 PM   Last 3 PDI Scores   Pain Disability Index (PDI) 49 [phone] 49     Pain Medications:  Norco 10-325mg TID, Robaxin 500mg TID, and Lyrica 150mg TID    Opioid Contract: yes     report:  Reviewed and consistent with medication " use as prescribed.    Pain Procedures:    - reports possible back injection 10+ years ago  - Lumbar sympathetic nerve block 05/05/21 - Dr. Pereira - minimal relief    Physical Therapy/Home Exercise: no     Imaging:   Hip X-ray 4/7/21  FINDINGS:  Osseous structures appear intact without evidence of fracture or osseous destructive process.  No apparent dislocation.     Modest degenerative change or significant joint space narrowing.     Lumbar X-ray 4/7/21  FINDINGS:  Diffuse bony demineralization.  Vertebral bodies are normal in height without evidence of fracture.  No pars defects.     Normal sagittal alignment is preserved.  No spondylolisthesis. No abnormal translation with flexion and extension.     Intervertebral disc heights are well maintained.  Mild facet arthropathy in the lower lumbar spine.     Mild scattered vascular calcification.     Impression:     No evidence of fracture or malalignment.     Mild facet arthropathy in the lower lumbar spine.     Diffuse bony demineralization.  Consider correlation with DEXA.    Allergies:   Review of patient's allergies indicates:   Allergen Reactions    Bleach (sodium hypochlorite) Shortness Of Breath    Nitrofurantoin macrocrystalline Anaphylaxis    Lipitor [atorvastatin] Diarrhea and Rash    Nsaids (non-steroidal anti-inflammatory drug)      Tolerates aspirin      Pcn [penicillins]     Statins-hmg-coa reductase inhibitors     Toradol [ketorolac]        Current Medications:   Current Outpatient Medications   Medication Sig Dispense Refill    acetaminophen (TYLENOL) 500 MG tablet Take 2 tablets (1,000 mg total) by mouth every 8 (eight) hours as needed for Pain.  0    albuterol (ACCUNEB) 1.25 mg/3 mL Nebu Take 3 mLs (1.25 mg total) by nebulization every 6 (six) hours as needed (for wheezing or shortness of breath). Rescue 75 mL 9    allopurinoL (ZYLOPRIM) 300 MG tablet Take 1 tablet (300 mg total) by mouth once daily. 90 tablet 3    aspirin (ECOTRIN) 81 MG EC tablet  Take 1 tablet (81 mg total) by mouth once daily. 30 tablet 0    blood sugar diagnostic Strp To check BG 6 times daily, to use with insurance preferred meter 200 each 11    blood-glucose meter kit To check BG 6 times daily, to use with insurance preferred meter 1 each 0    evolocumab (REPATHA SURECLICK) 140 mg/mL PnIj Inject 1 mL (140 mg total) into the skin every 14 (fourteen) days. 2 each 11    fenofibrate micronized (LOFIBRA) 134 MG Cap Take 1 capsule (134 mg total) by mouth daily with breakfast. 90 capsule 3    HYDROcodone-acetaminophen (NORCO)  mg per tablet Take 1 tablet by mouth every 8 (eight) hours as needed for Pain. 90 tablet 0    insulin glargine U-100, Lantus, (LANTUS SOLOSTAR U-100 INSULIN) 100 unit/mL (3 mL) InPn pen Take 20 units in the AM and 26 units in the PM 30 mL 3    insulin lispro (HUMALOG KWIKPEN INSULIN) 100 unit/mL pen Inject 10 Units into the skin 3 (three) times daily before meals. 27 mL 3    ipratropium-albuteroL (COMBIVENT RESPIMAT)  mcg/actuation inhaler INHALE 1 PUFF INTO THE LUNGS BY MOUTH EVERY 6 HOURS 4 g 6    lancets (TRUEPLUS LANCETS) 30 gauge Misc Check blood sugar 4 times daily 400 each 2    losartan (COZAAR) 50 MG tablet Take 1 tablet (50 mg total) by mouth once daily. 90 tablet 3    melatonin (MELATIN) 3 mg tablet Take 2 tablets (6 mg total) by mouth nightly as needed for Insomnia.      methocarbamoL (ROBAXIN) 500 MG Tab Take 500 mg by mouth 4 (four) times daily.      methocarbamoL (ROBAXIN) 500 MG Tab Take 1 tablet (500 mg total) by mouth 3 (three) times daily. 90 tablet 2    metoclopramide HCl (REGLAN) 5 MG tablet TAKE ONE TABLET BY MOUTH FOUR TIMES DAILY AS NEEDED FOR nausea prevention 90 tablet 3    metoprolol succinate (TOPROL-XL) 100 MG 24 hr tablet Take 1 tablet (100 mg total) by mouth once daily. 90 tablet 3    miconazole nitrate 2% (MICOTIN) 2 % Oint Apply topically 2 (two) times daily.      NIFEdipine (PROCARDIA-XL) 30 MG (OSM) 24 hr tablet Take 1 tablet  (30 mg total) by mouth 2 (two) times a day. 180 tablet 3    pregabalin (LYRICA) 75 MG capsule Take 1 capsule (75 mg total) by mouth 3 (three) times daily. 90 capsule 3    senna-docusate 8.6-50 mg (PERICOLACE) 8.6-50 mg per tablet Take 1 tablet by mouth 2 (two) times daily as needed for Constipation. 60 tablet 0    sertraline (ZOLOFT) 100 MG tablet TAKE ONE TABLET BY MOUTH EVERY DAY 90 tablet 3    teriparatide 20 mcg/dose (620mcg/2.48mL) PnIj Inject 20 mcg into the skin once daily. Discard remainder after 28 days of use. 2.48 mL 2    torsemide (DEMADEX) 20 MG Tab Take 1 tablet (20 mg total) by mouth once daily. 90 tablet 3     No current facility-administered medications for this visit.       REVIEW OF SYSTEMS:    GENERAL:  No weight loss, malaise or fevers.  HEENT:  Negative for frequent or significant headaches.  NECK:  Negative for lumps, goiter, pain and significant neck swelling.  RESPIRATORY:  Negative for cough, wheezing or shortness of breath.  CARDIOVASCULAR:  Negative for chest pain, leg swelling or palpitations. HTN  GI:  Negative for abdominal discomfort, blood in stools or black stools or change in bowel habits.  MUSCULOSKELETAL:  See HPI.  SKIN:  Negative for lesions, rash, and itching.  ENDO: Diabetes  PSYCH:  Negative for sleep disturbance, mood disorder and recent psychosocial stressors.  HEMATOLOGY/LYMPHOLOGY:  Negative for prolonged bleeding, bruising easily or swollen nodes.  NEURO:   No history of headaches, syncope, paralysis, seizures or tremors.  All other reviewed and negative other than HPI.    Past Medical History:  Past Medical History:   Diagnosis Date    Arthritis     Back pain     Cancer     ovarian    Cervical cancer     Coronary artery disease     Depression     Diabetes mellitus     Fibromyalgia     Heart attack     History of MI (myocardial infarction)     Hyperlipidemia     Hypertension     Lupus     Stroke     slight left sided weakness       Past Surgical History:  Past Surgical  History:   Procedure Laterality Date    APPENDECTOMY       SECTION      2    CORONARY ANGIOGRAPHY N/A 2022    Procedure: ANGIOGRAM, CORONARY ARTERY;  Surgeon: Jose L Méndez MD;  Location: [facility] CATH LAB;  Service: Cardiology;  Laterality: N/A;    HYSTERECTOMY      with USO for cervical cancer    INJECTION OF ANESTHETIC AGENT AROUND NERVE Bilateral 2021    Procedure: BLOCK, NERVE, SYMPATHIC;  Surgeon: Holden Pereira MD;  Location: [facility] PAIN MGT;  Service: Pain Management;  Laterality: Bilateral;    INJECTION OF ANESTHETIC AGENT AROUND NERVE N/A 2021    Procedure: BLOCK, NERVE, SYMPATHETIC  need consent;  Surgeon: Holden Pereira MD;  Location: [facility] PAIN MGT;  Service: Pain Management;  Laterality: N/A;    YARED LINK,SWALLOW FUNCTION,CINE/VIDEO RECORD  2021         TONSILLECTOMY      TRIAL OF SPINAL CORD NERVE STIMULATOR N/A 3/8/2023    Procedure: LUMBAR SPINAL CORD STIMULATOR TRIAL NEVRO REP PATIENT STATES SHE NO LONGER TAKES PLAVIX;  Surgeon: Holden Pereira MD;  Location: [facility] PAIN MGT;  Service: Pain Management;  Laterality: N/A;       Family History:  Family History   Problem Relation Name Age of Onset    COPD Mother      Lupus Mother      Hernia Mother      Uterine cancer Mother          vs cervical cancer    Ovarian cancer Mother      Diabetes Father      Coronary artery disease Father      Colon cancer Maternal Grandmother          in her 50's       Social History:  Social History     Socioeconomic History    Marital status:    Tobacco Use    Smoking status: Former     Current packs/day: 0.00     Types: Cigarettes     Quit date: 2020     Years since quitting: 3.8     Passive exposure: Past    Smokeless tobacco: Never   Substance and Sexual Activity    Alcohol use: Not Currently     Comment: occasionally    Drug use: Yes     Types: Hydrocodone     Comment: three times a day    Sexual activity: Not Currently   Social History Narrative    Lives with daughter.  .      Social Determinants of Health     Financial Resource Strain: Low Risk  (7/20/2024)    Overall Financial Resource Strain (CARDIA)     Difficulty of Paying Living Expenses: Not hard at all   Food Insecurity: No Food Insecurity (7/20/2024)    Hunger Vital Sign     Worried About Running Out of Food in the Last Year: Never true     Ran Out of Food in the Last Year: Never true   Transportation Needs: No Transportation Needs (7/20/2024)    TRANSPORTATION NEEDS     Transportation : No   Physical Activity: Inactive (7/20/2024)    Exercise Vital Sign     Days of Exercise per Week: 0 days     Minutes of Exercise per Session: 0 min   Stress: No Stress Concern Present (7/20/2024)    [employer] - Occupational Stress Questionnaire     Feeling of Stress : Not at all   Housing Stability: Low Risk  (7/20/2024)    Housing Stability Vital Sign     Unable to Pay for Housing in the Last Year: No     Homeless in the Last Year: No       OBJECTIVE:    BP (!) 142/68   Pulse 85   Temp 97.6 °F (36.4 °C)   SpO2 99%     PHYSICAL EXAMINATION:     General appearance: Well appearing, in no acute distress, alert and oriented x3.  Psych:  Mood and affect appropriate.  Skin: Skin color, texture, turgor normal, no rashes or lesions, in both upper and lower body.  Head/face:  Atraumatic, normocephalic.  Pulm: Symmetric chest rise, no respiratory distress noted.   Back: Straight leg raise in the sitting position is positive for radicular pain. There is pain with palpation over lumbar facet joints. Limited ROM with pain on flexion and extension.   Musculoskeletal: There is pain with palpation over bilateral SI joints. There is pain with palpation over medial and lateral joint line of left knee. 5/5 strength in right ankle with plantar and dorsiflexion. 5/5 strength in left ankle with plantar and dorsiflexion. 4/5 strength with right knee flexion and extension. 4/5 strength with left knee flexion and  extension.   Neuro:  Decreased sensation to light touch to BLE.   Gait: Antalgic- ambulates with wheelchair.     ASSESSMENT: 79 y.o. year old female with chronic pain, consistent with:     1. Chronic pain disorder        2. Painful diabetic neuropathy  capsaicin-skin cleanser patch 4 patch      3. Lumbar spondylosis        4. DDD (degenerative disc disease), lumbar              PLAN:     - Previous imaging reviewed. Labs reviewed.     - Qutenza patch as per below.     - She is scheduled for knee injection in November.     - She is awaiting scheduling for lumbar procedure.     - Continue Robaxin 500 mg TID PRN.     - Continue Lyrica 150 mg TID.     - Continue Norco 10/325 mg TID PRN, #90, refill already sent.     - The patient is here today for a refill of current pain medications and they believe these provide effective pain control and improvements in quality of life.  The patient notes no serious side effects, and feels the benefits outweigh the risks.  The patient was reminded of the pain contract that they signed previously as well as the risks and benefits of the medication including possible death.  The updated [place] [employer] prescription monitoring program was reviewed, and the patient has been found to be compliant with current treatment plan.     - UDS from 9/19/2024 in progress.     - RTC in 3 months.       The above plan and management options were discussed at length with patient. Patient is in agreement with the above and verbalized understanding.    Elvira Hylton NP  09/24/2024    PATIENT NAME: Indira Waters        MRN: [id number]    Diagnosis: Painful Diabetic Neuropathy E13.42     Postprocedural Diagnosis: Same     Complications: None     Informed Consent:  The patient's condition and proposed procedures, risks (including complications of nerve damage, skin irritation, infection, and failure of pain relief), and alternatives were discussed with the patient or responsible party.   The patient's/responsible party's questions were answered.  The patient/responsible party appeared to understand and chose to proceed.       Procedural Pause:  A procedural pause verifying correct patient, medical record number, allergies, medications to be administered, current vital signs, and proper application site was performed immediately prior to beginning the procedure.     Procedure in Detail:  The patient was placed in a sitting position. 4 patches were used for the procedure today.  The patches were applied to the target area and secured with tape.  A coban was used to further secure the patches in place.  The patient was allowed to rest in a comfortable position, and monitored by staff every 10-15 minutes to ensure comfort. The patches remained in place for 30 minutes.  The patches were then removed and the cleaning gel was applied and allowed to sit for an additional 60 seconds, after which the area was wiped clean.      The patient was then discharged in stable condition.        DISCUSSION:  A Qutenza patch was applied today with the purpose of treating the patients nerve pain. They were informed that they may have some burning of the skin for a few days, and should not take a hot shower for 2-3 days as that may irritate the area.  They were informed that the area may feel like they had a sunburn. They were informed that it can take about 2-4 weeks for the effects of the patch to be fully realized     Plan to repeat every 91 days.

## 2024-09-25 ENCOUNTER — PATIENT MESSAGE (OUTPATIENT)
Dept: PAIN MEDICINE | Facility: OTHER | Age: 79
End: 2024-09-25
Payer: MEDICARE

## 2024-09-25 LAB
ALBUMIN/CREAT UR: 59.9 UG/MG (ref 0–30)
CREAT UR-MCNC: 16.7 MG/DL (ref 15–325)
MICROALBUMIN UR DL<=1MG/L-MCNC: 10 UG/ML

## 2024-09-25 NOTE — TELEPHONE ENCOUNTER
Called and spoke to [facility] in lab. Unable to link as requested. Discussed with MD and informed [facility] that we are having problems linking. She will manually place order with ICD 9 code E11.2 Diabetic Polyneuropathy

## 2024-09-26 LAB — BACTERIA UR CULT: NORMAL

## 2024-09-30 DIAGNOSIS — M54.16 LUMBAR RADICULOPATHY, CHRONIC: ICD-10-CM

## 2024-09-30 DIAGNOSIS — E11.40 PAINFUL DIABETIC NEUROPATHY: ICD-10-CM

## 2024-09-30 DIAGNOSIS — G89.4 CHRONIC PAIN DISORDER: ICD-10-CM

## 2024-09-30 DIAGNOSIS — M17.0 PRIMARY OSTEOARTHRITIS OF BOTH KNEES: ICD-10-CM

## 2024-09-30 PROBLEM — J96.01 ACUTE RESPIRATORY FAILURE WITH HYPOXIA AND HYPERCAPNIA: Status: RESOLVED | Noted: 2024-06-21 | Resolved: 2024-09-30

## 2024-09-30 PROBLEM — J96.02 ACUTE RESPIRATORY FAILURE WITH HYPOXIA AND HYPERCAPNIA: Status: RESOLVED | Noted: 2024-06-21 | Resolved: 2024-09-30

## 2024-09-30 RX ORDER — HYDROCODONE BITARTRATE AND ACETAMINOPHEN 10; 325 MG/1; MG/1
1 TABLET ORAL EVERY 8 HOURS PRN
Qty: 90 TABLET | Refills: 0 | Status: SHIPPED | OUTPATIENT
Start: 2024-09-30

## 2024-09-30 RX ORDER — PREGABALIN 75 MG/1
75 CAPSULE ORAL 3 TIMES DAILY
Qty: 90 CAPSULE | Refills: 3 | Status: SHIPPED | OUTPATIENT
Start: 2024-09-30

## 2024-09-30 NOTE — TELEPHONE ENCOUNTER
Patient requesting refill on Norco and Lyrica  Last office visit 9-24-24   shows last refill on 9-3-24  Patient does have a pain contract on file with [facility] department  Patient last UDS 9-19-24 was consistent with current therapy       CODEINE  Not Detected     Not Detected CM    Comment: INTERPRETIVE INFORMATION: Codeine, U  Positive Cutoff: 40 ng/mL  Methodology: Mass Spectrometry   MORPHINE  Not Detected     Not Detected CM    Comment: INTERPRETIVE INFORMATION:Morphine, U  Positive Cutoff: 20 ng/mL  Methodology: Mass Spectrometry   6-ACETYLMORPHINE  Not Detected     Not Detected CM    Comment: INTERPRETIVE INFORMATION:6-acetylmorphine, U  Positive Cutoff: 20 ng/mL  Methodology: Mass Spectrometry   OXYCODONE  Not Detected     Not Detected CM    Comment: INTERPRETIVE INFORMATION:Oxycodone, U  Positive Cutoff: 40 ng/mL  Methodology: Mass Spectrometry   NOROYXCODONE  Not Detected     Not Detected CM    Comment: INTERPRETIVE INFORMATION:Noroxycodone, U  Positive Cutoff: 100 ng/mL  Methodology: Mass Spectrometry   OXYMORPHONE  Not Detected     Not Detected CM    Comment: INTERPRETIVE INFORMATION:Oxymorphone, U  Positive Cutoff: 40 ng/mL  Methodology: Mass Spectrometry   NOROXYMORPHONE  Not Detected     Not Detected CM    Comment: INTERPRETIVE INFORMATION:Noroxymorphone, U  Positive Cutoff: 100 ng/mL  Methodology: Mass Spectrometry   HYDROCODONE  Present     Present CM    Comment: INTERPRETIVE INFORMATION:Hydrocodone, U  Positive Cutoff: 40 ng/mL  Methodology: Mass Spectrometry   NORHYDROCODONE  Present     Present CM    Comment: INTERPRETIVE INFORMATION:Norhydrocodone, U  Positive Cutoff: 100 ng/mL  Methodology: Mass Spectrometry   HYDROMORPHONE  Present     Present CM    Comment: INTERPRETIVE INFORMATION:Hydromorphone, U  Positive Cutoff: 20 ng/mL  Methodology: Mass Spectrometry   BUPRENORPHINE  Not Detected     Not Detected CM    Comment: INTERPRETIVE INFORMATION:Buprenorphine,  U  Positive Cutoff: 5 ng/mL  Methodology: Mass Spectrometry   NORUBPRENORPHINE  Not Detected     Not Detected CM    Comment: INTERPRETIVE INFORMATION:Norbuprenorphine, U  Positive Cutoff: 20 ng/mL  Methodology: Mass Spectrometry   FENTANYL  Not Detected     Not Detected CM    Comment: INTERPRETIVE INFORMATION:Fentanyl, U  Positive Cutoff: 2 ng/mL  Methodology: Mass Spectrometry   NORFENTANYL  Not Detected     Not Detected CM    Comment: INTERPRETIVE INFORMATION:Norfentanyl, U  Positive Cutoff: 2 ng/mL  Methodology: Mass Spectrometry   MEPERIDINE METABOLITE  Not Detected     Not Detected CM    Comment: INTERPRETIVE INFORMATION:Meperidine metabolite, U  Positive Cutoff: 50 ng/mL  Methodology: Mass Spectrometry   TAPENTADOL  Not Detected     Not Detected CM    Comment: INTERPRETIVE INFORMATION:Tapentadol, U  Positive Cutoff: 100 ng/mL  Methodology: Mass Spectrometry   TAPENTADOL-O-SULF  Not Detected     Not Detected CM    Comment: INTERPRETIVE INFORMATION:Tapentadol-o-Sulf, U  Positive Cutoff: 200 ng/mL  Methodology: Mass Spectrometry   METHADONE  Negative     Negative CM    Comment: Presumptive negative by immunoassay. Testing by mass  spectrometry is available on request.  INTERPRETIVE INFORMATION: Methadone Screen, U  Positive Cutoff: 150 ng/mL  Methodology: Immunoassay   TRAMADOL  Negative     Negative CM    Comment: Presumptive negative by immunoassay. Testing by mass  spectrometry is available on request.  INTERPRETIVE INFORMATION:Tramadol Screen, U  Positive Cutoff: 100 ng/mL  Methodology: Immunoassay   AMPHETAMINE  Not Detected     Not Detected CM    Comment: INTERPRETIVE INFORMATION:Amphetamine, U  Positive Cutoff: 50 ng/mL  Methodology: Mass Spectrometry   METHAMPHETAMINE  Not Detected     Not Detected CM    Comment: INTERPRETIVE INFORMATION:Methamphetamine, U  Positive Cutoff: 200 ng/mL  Methodology: Mass Spectrometry   MDMA- ECSTASY  Not Detected     Not Detected CM    Comment: INTERPRETIVE  INFORMATION:MDMA, U  Positive Cutoff: 200 ng/mL  Methodology: Mass Spectrometry   MDA  Not Detected     Not Detected CM    Comment: INTERPRETIVE INFORMATION:MDA, U  Positive Cutoff: 200 ng/mL  Methodology: Mass Spectrometry   MDEA- Mary  Not Detected     Not Detected CM    Comment: INTERPRETIVE INFORMATION:MDEA, U  Positive Cutoff: 200 ng/mL  Methodology: Mass Spectrometry   METHYLPHENIDATE  Not Detected     Not Detected CM    Comment: INTERPRETIVE INFORMATION:Methylphenidate, U  Positive Cutoff: 100 ng/mL  Methodology: Mass Spectrometry   PHENTERMINE  Not Detected     Not Detected CM    Comment: INTERPRETIVE INFORMATION:Phentermine, U  Positive Cutoff: 100 ng/mL  Methodology: Mass Spectrometry   BENZOYLECGONINE  Negative     Negative CM    Comment: Presumptive negative by immunoassay. Testing by mass  spectrometry is available on request.  INTERPRETIVE INFORMATION:Cocaine Screen, U  Positive Cutoff: 150 ng/mL  Methodology: Immunoassay   ALPRAZOLAM  Not Detected     Not Detected CM    Comment: INTERPRETIVE INFORMATION:Alprazolam, U  Positive Cutoff: 40 ng/mL  Methodology: Mass Spectrometry   ALPHA-OH-ALPRAZOLAM  Not Detected     Not Detected CM    Comment: INTERPRETIVE INFORMATION:Alpha-OH-Alprazolam, U  Positive Cutoff: 20 ng/mL  Methodology: Mass Spectrometry   CLONAZEPAM  Not Detected     Not Detected CM    Comment: INTERPRETIVE INFORMATION:Clonazepam, U  Positive Cutoff: 20 ng/mL  Methodology: Mass Spectrometry   7-AMINOCLONAZEPAM  Not Detected     Not Detected CM    Comment: INTERPRETIVE INFORMATION:7-Aminoclonazepam, U  Positive Cutoff: 40 ng/mL  Methodology: Mass Spectrometry   DIAZEPAM  Not Detected     Not Detected CM    Comment: INTERPRETIVE INFORMATION:Diazepam, U  Positive Cutoff: 50 ng/mL  Methodology: Mass Spectrometry   NORDIAZEPAM  Not Detected     Not Detected CM    Comment: INTERPRETIVE INFORMATION:Nordiazepam, U  Positive Cutoff: 50 ng/mL  Methodology: Mass Spectrometry   OXAZEPAM  Not Detected      Not Detected CM    Comment: INTERPRETIVE INFORMATION:Oxazepam, U  Positive Cutoff: 50 ng/mL  Methodology: Mass Spectrometry   TEMAZEPAM  Not Detected     Not Detected CM    Comment: INTERPRETIVE INFORMATION:Temazepam, U  Positive Cutoff: 50 ng/mL  Methodology: Mass Spectrometry   Lorazepam  Not Detected     Not Detected CM    Comment: INTERPRETIVE INFORMATION:Lorazepam, U  Positive Cutoff: 60 ng/mL  Methodology: Mass Spectrometry   MIDAZOLAM  Not Detected     Not Detected CM    Comment: INTERPRETIVE INFORMATION:Midazolam, U  Positive Cutoff: 20 ng/mL  Methodology: Mass Spectrometry   ZOLPIDEM  Not Detected     Not Detected CM    Comment: INTERPRETIVE INFORMATION:Zolpidem, U  Positive Cutoff: 20 ng/mL  Methodology: Mass Spectrometry   BARBITURATES  Negative     Negative CM    Comment: Presumptive negative by immunoassay. Testing by mass  spectrometry is available on request.  INTERPRETIVE INFORMATION:Barbiturates Screen, U  Positive Cutoff: 200 ng/mL  Methodology: Immunoassay   Creatinine, Urine 20.0 - 400.0 mg/dL <20.0 15.1 R 15.1 R 38.4 R 37.4 R <20.0 CM 24.0 R   Comment: Creatinine <20 mg/dL is consistent with a dilute urine  specimen. Recollection to obtain a more concentrated  specimen, such as a first morning void, may be considered  if unexpected negative urine drug screening results were  obtained.   ETHYL GLUCURONIDE  Negative     Negative CM    Comment: Presumptive negative by immunoassay. Testing by mass  spectrometry is available on request.  INTERPRETIVE INFORMATION:Ethyl Glucuronide Screen, U  Positive Cutoff: 500 ng/mL  Methodology: Immunoassay   MARIJUANA METABOLITE  Negative     Negative CM    Comment: Presumptive negative by immunoassay. Testing by mass  spectrometry is available on request.  INTERPRETIVE INFORMATION: THC (Cannabinoids) Screen, U  Positive Cutoff: 50 ng/mL  Methodology: Immunoassay   PCP  Negative     Negative CM    Comment: Presumptive negative by immunoassay. Testing by  mass  spectrometry is available on request.  INTERPRETIVE INFORMATION:Phencyclidine Screen, U  Positive Cutoff: 25 ng/mL  Methodology: Immunoassay   CARISOPRODOL  Negative     Negative CM    Comment: Presumptive negative by immunoassay. Testing by mass  spectrometry is available on request.  INTERPRETIVE INFORMATION: Carisoprodol Screen, U  Positive Cutoff: 100 ng/mL  Methodology: Immunoassay  The carisoprodol immunoassay has cross-reactivity to  carisoprodol and meprobamate.   Naloxone  Not Detected     Not Detected CM    Comment: INTERPRETIVE INFORMATION:Naloxone, U  Positive Cutoff: 100 ng/mL  Methodology: Mass Spectrometry   Gabapentin  Not Detected     Not Detected CM    Comment: INTERPRETIVE INFORMATION:Gabapentin, U  Positive Cutoff: 3,000 ng/mL  Methodology: Mass Spectrometry   Pregabalin  Present     Present CM    Comment: INTERPRETIVE INFORMATION:Pregabalin, U  Positive Cutoff: 3,000 ng/mL  Methodology: Mass Spectrometry   Alpha-OH-Midazolam  Not Detected     Not Detected CM    Comment: INTERPRETIVE INFORMATION:Alpha-OH-Midazolam, U  Positive Cutoff: 20 ng/mL  Methodology: Mass Spectrometry   Zolpidem Metabolite  Not Detected     Not Detected CM    Comment: INTERPRETIVE INFORMATION:Zolpidem Metabolite, U  Positive Cutoff: 100 ng/mL  Methodology: Mass Spectrometry   PAIN MANAGEMENT DRUG PANEL  See Below     See Below CM    Comment: Methodology: Qualitative Enzyme Immunoassay and Qualitative  Liquid Chromatography-Tandem Mass Spectrometry,  Quantitative Spectrophotometry  The absence of expected drug(s) and/or drug metabolite(s)  may indicate non-compliance, inappropriate timing of  specimen collection relative to drug administration, poor  drug absorption, diluted/adulterated urine, or limitations  of testing. The concentration must be greater than or equal  to the cutoff to be reported as present.  If specific drug  concentrations are required, contact the laboratory within  two weeks of specimen  collection to request quantification  by a second analytical technique. Interpretive questions  should be directed to the laboratory.  Results based on immunoassay detection that do not match  clinical expectations should be  interpreted with caution. Confirmatory testing by mass  spectrometry for immunoassay-based results is available, if  ordered within two weeks of specimen collection. Additional  charges apply.  For medical purposes only; not valid for forensic use.  This test was developed and its performance characteristics  determined by [employer] It has not been cleared or  approved by the [place] Food and Drug Administration. This test  was performed in a CLIA certified laboratory and is  intended for clinical purposes.

## 2024-10-08 ENCOUNTER — DOCUMENT SCAN (OUTPATIENT)
Dept: HOME HEALTH SERVICES | Facility: HOSPITAL | Age: 79
End: 2024-10-08
Payer: MEDICARE

## 2024-10-08 ENCOUNTER — PATIENT MESSAGE (OUTPATIENT)
Dept: ENDOCRINOLOGY | Facility: CLINIC | Age: 79
End: 2024-10-08
Payer: MEDICARE

## 2024-10-08 DIAGNOSIS — M81.0 AGE-RELATED OSTEOPOROSIS WITHOUT CURRENT PATHOLOGICAL FRACTURE: ICD-10-CM

## 2024-10-08 RX ORDER — TERIPARATIDE 250 UG/ML
20 INJECTION, SOLUTION SUBCUTANEOUS DAILY
Qty: 2.48 ML | Refills: 2 | OUTPATIENT
Start: 2024-10-08

## 2024-10-10 DIAGNOSIS — M81.0 AGE-RELATED OSTEOPOROSIS WITHOUT CURRENT PATHOLOGICAL FRACTURE: ICD-10-CM

## 2024-10-10 RX ORDER — TERIPARATIDE 250 UG/ML
20 INJECTION, SOLUTION SUBCUTANEOUS DAILY
Qty: 2.48 ML | Refills: 2 | Status: CANCELLED | OUTPATIENT
Start: 2024-10-10

## 2024-10-14 ENCOUNTER — TELEPHONE (OUTPATIENT)
Dept: PAIN MEDICINE | Facility: CLINIC | Age: 79
End: 2024-10-14
Payer: MEDICARE

## 2024-10-14 ENCOUNTER — EXTERNAL HOME HEALTH (OUTPATIENT)
Dept: HOME HEALTH SERVICES | Facility: HOSPITAL | Age: 79
End: 2024-10-14
Payer: MEDICARE

## 2024-10-14 NOTE — TELEPHONE ENCOUNTER
Staff returned call to Esha (patient daughter) . Staff was unable to gain more information on the procedure scheduled for 10/16. Staff transferred patient to Pain Management procedure department to further assist

## 2024-10-14 NOTE — TELEPHONE ENCOUNTER
----- Message from [employer] sent at 10/14/2024 10:19 AM CDT -----  Name of Who is Calling: Pt daughter Esha is calling on behalf of  CAYLA GOODEN [[id number]]          What is the request in detail: Pt is requesting a call back regarding back procedure that is scheduled for 10/16/2024.           Can the clinic reply by [facility]: No          [facility]  [phone]

## 2024-10-15 ENCOUNTER — TELEPHONE (OUTPATIENT)
Dept: PAIN MEDICINE | Facility: CLINIC | Age: 79
End: 2024-10-15
Payer: MEDICARE

## 2024-10-15 ENCOUNTER — PATIENT MESSAGE (OUTPATIENT)
Dept: PAIN MEDICINE | Facility: OTHER | Age: 79
End: 2024-10-15
Payer: MEDICARE

## 2024-10-15 DIAGNOSIS — G89.4 CHRONIC PAIN DISORDER: ICD-10-CM

## 2024-10-15 DIAGNOSIS — G89.4 CHRONIC PAIN SYNDROME: ICD-10-CM

## 2024-10-15 DIAGNOSIS — E08.42 DIABETIC POLYNEUROPATHY ASSOCIATED WITH DIABETES MELLITUS DUE TO UNDERLYING CONDITION: ICD-10-CM

## 2024-10-15 RX ORDER — METHOCARBAMOL 500 MG/1
TABLET, FILM COATED ORAL
Qty: 90 TABLET | Refills: 2 | Status: SHIPPED | OUTPATIENT
Start: 2024-10-15

## 2024-10-15 NOTE — TELEPHONE ENCOUNTER
Staff contacted Esha-daughter, informed her that an appeal would be the next step and the patient would have to contact insurance to authorize this.   She verbally repeated what the steps were and the call ended pleasant.

## 2024-10-16 DIAGNOSIS — I20.89 ANGINA AT REST: ICD-10-CM

## 2024-10-16 RX ORDER — NITROGLYCERIN 0.4 MG/1
TABLET SUBLINGUAL
Qty: 25 TABLET | Refills: 11 | Status: SHIPPED | OUTPATIENT
Start: 2024-10-16

## 2024-10-21 PROBLEM — J96.21 ACUTE ON CHRONIC RESPIRATORY FAILURE WITH HYPOXIA: Status: RESOLVED | Noted: 2024-07-19 | Resolved: 2024-10-21

## 2024-10-28 DIAGNOSIS — G89.4 CHRONIC PAIN DISORDER: Primary | ICD-10-CM

## 2024-10-28 DIAGNOSIS — M17.0 PRIMARY OSTEOARTHRITIS OF BOTH KNEES: ICD-10-CM

## 2024-10-28 DIAGNOSIS — M54.16 LUMBAR RADICULOPATHY, CHRONIC: ICD-10-CM

## 2024-10-28 DIAGNOSIS — E11.40 PAINFUL DIABETIC NEUROPATHY: ICD-10-CM

## 2024-10-30 ENCOUNTER — OFFICE VISIT (OUTPATIENT)
Dept: NEPHROLOGY | Facility: CLINIC | Age: 79
End: 2024-10-30
Payer: MEDICARE

## 2024-10-30 ENCOUNTER — TELEPHONE (OUTPATIENT)
Dept: PAIN MEDICINE | Facility: CLINIC | Age: 79
End: 2024-10-30
Payer: MEDICARE

## 2024-10-30 ENCOUNTER — LAB VISIT (OUTPATIENT)
Dept: LAB | Facility: HOSPITAL | Age: 79
End: 2024-10-30
Attending: INTERNAL MEDICINE
Payer: MEDICARE

## 2024-10-30 VITALS
WEIGHT: 201 LBS | SYSTOLIC BLOOD PRESSURE: 120 MMHG | OXYGEN SATURATION: 96 % | DIASTOLIC BLOOD PRESSURE: 78 MMHG | HEART RATE: 79 BPM | BODY MASS INDEX: 39.26 KG/M2

## 2024-10-30 DIAGNOSIS — N17.9 AKI (ACUTE KIDNEY INJURY): ICD-10-CM

## 2024-10-30 DIAGNOSIS — N20.0 NEPHROLITHIASIS: Primary | ICD-10-CM

## 2024-10-30 DIAGNOSIS — R60.9 EDEMA, UNSPECIFIED TYPE: ICD-10-CM

## 2024-10-30 DIAGNOSIS — M17.0 PRIMARY OSTEOARTHRITIS OF BOTH KNEES: Primary | ICD-10-CM

## 2024-10-30 LAB
ANION GAP SERPL CALC-SCNC: 13 MMOL/L (ref 8–16)
BUN SERPL-MCNC: 29 MG/DL (ref 8–23)
CALCIUM SERPL-MCNC: 10.2 MG/DL (ref 8.7–10.5)
CHLORIDE SERPL-SCNC: 98 MMOL/L (ref 95–110)
CO2 SERPL-SCNC: 28 MMOL/L (ref 23–29)
CREAT SERPL-MCNC: 1.2 MG/DL (ref 0.5–1.4)
EST. GFR  (NO RACE VARIABLE): 46.3 ML/MIN/1.73 M^2
GLUCOSE SERPL-MCNC: 124 MG/DL (ref 70–110)
POTASSIUM SERPL-SCNC: 4 MMOL/L (ref 3.5–5.1)
SODIUM SERPL-SCNC: 139 MMOL/L (ref 136–145)

## 2024-10-30 PROCEDURE — 99212 OFFICE O/P EST SF 10 MIN: CPT | Mod: PBBFAC | Performed by: INTERNAL MEDICINE

## 2024-10-30 PROCEDURE — 36415 COLL VENOUS BLD VENIPUNCTURE: CPT | Performed by: INTERNAL MEDICINE

## 2024-10-30 PROCEDURE — 99999 PR PBB SHADOW E&M-EST. PATIENT-LVL II: CPT | Mod: PBBFAC,,, | Performed by: INTERNAL MEDICINE

## 2024-10-30 PROCEDURE — 80048 BASIC METABOLIC PNL TOTAL CA: CPT | Performed by: INTERNAL MEDICINE

## 2024-10-30 PROCEDURE — 99213 OFFICE O/P EST LOW 20 MIN: CPT | Mod: S$PBB,,, | Performed by: INTERNAL MEDICINE

## 2024-10-30 RX ORDER — HYDROCODONE BITARTRATE AND ACETAMINOPHEN 10; 325 MG/1; MG/1
1 TABLET ORAL EVERY 8 HOURS PRN
Qty: 90 TABLET | Refills: 0 | Status: SHIPPED | OUTPATIENT
Start: 2024-10-30 | End: 2024-11-29

## 2024-10-31 ENCOUNTER — OFFICE VISIT (OUTPATIENT)
Dept: PAIN MEDICINE | Facility: CLINIC | Age: 79
End: 2024-10-31
Attending: ANESTHESIOLOGY
Payer: MEDICARE

## 2024-10-31 VITALS
WEIGHT: 201.94 LBS | HEART RATE: 74 BPM | DIASTOLIC BLOOD PRESSURE: 70 MMHG | SYSTOLIC BLOOD PRESSURE: 140 MMHG | BODY MASS INDEX: 39.44 KG/M2

## 2024-10-31 DIAGNOSIS — M17.12 PRIMARY OSTEOARTHRITIS OF LEFT KNEE: ICD-10-CM

## 2024-10-31 DIAGNOSIS — M17.11 PRIMARY OSTEOARTHRITIS OF RIGHT KNEE: Primary | ICD-10-CM

## 2024-10-31 DIAGNOSIS — M62.5A2 ATROPHY OF MUSCLE OF BACK AT LUMBOSACRAL LEVEL: ICD-10-CM

## 2024-10-31 DIAGNOSIS — G89.4 CHRONIC PAIN SYNDROME: ICD-10-CM

## 2024-10-31 PROCEDURE — 20611 DRAIN/INJ JOINT/BURSA W/US: CPT | Mod: 50,S$PBB,, | Performed by: ANESTHESIOLOGY

## 2024-10-31 PROCEDURE — 99999 PR PBB SHADOW E&M-EST. PATIENT-LVL III: CPT | Mod: PBBFAC,,, | Performed by: ANESTHESIOLOGY

## 2024-10-31 PROCEDURE — 99213 OFFICE O/P EST LOW 20 MIN: CPT | Mod: PBBFAC | Performed by: ANESTHESIOLOGY

## 2024-10-31 PROCEDURE — 20611 DRAIN/INJ JOINT/BURSA W/US: CPT | Mod: PBBFAC | Performed by: ANESTHESIOLOGY

## 2024-10-31 PROCEDURE — 99214 OFFICE O/P EST MOD 30 MIN: CPT | Mod: 25,S$PBB,GC, | Performed by: ANESTHESIOLOGY

## 2024-10-31 NOTE — PROGRESS NOTES
Chronic Pain Established Patient           PCP: Chun Zapata MD      CHIEF COMPLAINT: lower back pain        INTERVAL HISTORY (10/31/24):   She is here for follow up of lower back pain and L knee pain. She states the pain gets better with laying flat. States the it is worse when bending down vs extension. She reports increased Tylenol and Ibuprofen use. She states she uses a walker to get around as well as a rollater walker. She rates the pain as a 9/10. She reports radicular symptoms on the L side.  Was doing physical therapy and home PT but has since finished. Still doing home exercises.     Interval History 9/24/2024:  The patient returns to clinic today for follow up of back and foot pain. She reports worsened neuropathic pain of her bilateral feet. She describes this as burning in nature. Her pain is worse with walking. She also notes swelling into the feet. She does have benefit with Qutenza patches. She denies any adverse effects. She is scheduled for knee injection in November. She is taking Lyrica and Robaxin. She also takes Norco as needed with benefit. She denies any adverse effects. She denies any other health changes. Her pain today is 7/10.     Interval History 9/19/2024:  Patient returns to clinic today for follow up of low back pain. Left worse than right. She continues to take her medication as prescribed: Robaxin 500mg TID PRN, Lyrica 150mg TID, Norco 10 TID PRN. She will return to clinic this coming Tuesday for Qutenza patch for PDN. Patient seen by BRAN Felix on 9/3/24 and was sent for new lumbar MRI, which she completed on 9/10/24, results below. Her back pain is aching, throbbing, and stabbing. Rated as a 10/10. Pain is worse with standing, bending forward, and leaning back. Improved with resting. Denies red flag symptoms.      Patient also endorses bilateral knee pain. She has received bilateral  knee steroid injections and inquires about repeating those (L knee 3/28/24, R knee 4/18/24). She utilizes a cane to ambulate. L knee is worse than R knee. Pain focused to the lateral knees.      Interval History 9/3/2024:  The patient returns to clinic today for follow up of pain. Since last visit, she was admitted for CHF exacerbation. She spent a few weeks in inpatient rehab. She is currently participating in home health PT. She reports worsened low back pain that radiates into the posterolateral aspect of both legs to her feet, left grater than right. Her pain is worse with walking, and getting in/out of a vehicle. She continues to report left knee pain. She does have benefit with Qutenza patches and needs to schedule the next application. She is taking Lyrica and Robaxin. She also takes Norco as needed for severe pain with benefit. She denies any adverse effects. She has been out of medication since Sunday. Her pain today is 10/10.     Interval History 5/10/2024:  The patient returns to clinic today for follow up of pain. She is s/p left knee steroid injection on 3/28/2024. She reports limited relief. She is s/p right knee steroid injection on 4/18/2024. She reports significant relief of her right knee pain. She continues to report neuropathic pain into her bilateral feet. She describes this pain as burning and tingling in nature. She does have benefit with Qutenza patches. She is taking Lyrica. She also takes Norco as needed with benefit. She denies any adverse effects. She denies any other health changes. Her pain today is 9/10.     Interval History 3/14/2024:  Indira Waters returns today for f/u of her painful neuropathy in the feet. She was last seen in January for this complaint. Qutenza patches were administered at that time. We also continued medication management with Robaxin, Lyrica, and Norco. She reports incredible relief of neuropathic symptoms with qutenza injections. Significantly improves  walking ability. Primary complaint today is left knee pain. Started several years ago. Has been mostly intermittent over the years, but has been more constant and severe over the past 2 months. Localized over medial joint line. No falls. Pain today is an 8/10. Continues Norco 10/325 mg TID, consistently. No adverse effects. Feels like medications continue to help with functional mobility and ADL ability..      Interval History 1/26/2024:  The patient returns to clinic today for foot pain. She continues to report neuropathic pain into her feet. She describes this as burning in nature. She does find benefit with Qutenza patches. She denies any rash or increased pain after the patch application. She continues to take Lyrica, Robaxin, and Norco with benefit. She denies any adverse effects. She denies any other health changes. Her pain today is 8/10.     Interval History 12/14/2023:  The patient returns to clinic today for follow up of back and leg pain. She continues to report low back pain that radiates into both legs. She continues to report bilateral neuropathic pain into the feet. She states that today is a bad day. She is having more left foot and toe pain. Her pain is worse with prolonged standing and walking. She does have benefit with Qutenza patches. She is taking Lyrica, Robaxin, and Norco with benefit and without adverse effects. She denies other health changes. Her pain today is 8/10.     Interval History 10/11/2023:  The patient returns to clinic today for follow up of bilateral foot pain. She continues to report neuropathic pain to her feet. Her pain is worse with standing and walking. She does have benefit with Qutenza patches. She also takes Lyrica, Robaxin, and Norco. She denies any adverse effects. She denies any other health changes. Her pain today is 8/10.     Interval History 9/15/2023:  The patient returns to clinic today for follow up of back and leg pain. She did have benefit with Qutenza  patches. She continues to report low back pain with intermittent radicular leg pain. She continues to report neuropathic pain into her feet. She continues to take Lyrica and Robaxin with benefit. She also takes Norco as needed with benefit. She denies any adverse effects. She denies any other health changes. Her pain today is 9/10.     Interval History 6/27/2023:  The patient returns to clinic today for follow up of pain. She continues to report nerve pain to her feet bilaterally. This pain is worse with standing and walking. She is taking Lyrica and Robaxin. She also takes Norco as needed with benefit and without adverse effects. She denies any other health changes. Her pain today is 9/10.     Interval History 6/16/2023:  The patient returns to clinic today for follow up of back and leg pain. She continues to report low back pain. She also reports diabetic neuropathy to her bilateral feet. Her pain is worse with standing and walking. She has tried Qutenza patches with benefit in the past. She continues to take Robaxin and Lyrica. She also takes Norco as needed with benefit and without adverse effects. She denies any other health changes. Her pain today is 10/10.     Interval History 3/17/2023:  Mrs Waters presents for follow up of chronic, continuous neuropathic pain to [facility]. She is s/p Nevro SCS trial and unfortunately she did not get the relief she was hoping for and 50% at best relief but this was not consistent. She has no constitutional s/s concerning for infection and denies newer neurological changes since trial. She continues with medication mgt and states Qutenza application has been providing significant benefit.      Interval History 2/10/2023:  Mrs Waters presents for follow up of chronic lower back painn and radicular pain in conjunction with PDN to bilateral feet. She is doing fair with medication mgt of Norco, Robaxin, and Lyrica and does need refill at this time. She denies SE of medications. She is  also here for Qutenza application today. She is scheduled to have upcoming SCS trial with [facility] to aslo address her PDN.      Interval History 12/13/2022:  Mrs Waters presents for follow up of chronic pain. She is ready to repeat Qutenza application as it has been >91 days and she had significant improvement of symptoms of PDN. She recently had repeat A1C and just above 10.0 but is decreasing. She continues to take medication with benefit and denies SE of medication. Medication regimen include Norco 10/325mg TID, Robaxin 500mg and Lyrica 150mg.      Interval History 10/12/2022:  Mrs Waters presents for follow up of chronic neuropathy. She is s/p qutenza patch placement with improved functioning and neuropathy control. Unfortunately her A1C was above 11 which inhibits her from Nevro SCS trial at this time. She is eager to have SCS trial. She denies new areas of pain or neurological changes. She continued to take  Norco 10/325mg TID, Robaxin 500mg and Lyrica 150mg TID with benefit and denies significant SE of medications.      Interval History 9/8/2022:  Mrs Waters presents for follow up of chronic lower back painn and radicular pain in conjunction with PDN to bilateral feet. She is doing fair with medication mgt of Norco, Robaxin, and Lyrica and does need refill at this time. She denies SE of medications. She is patiently awaiting her A1C to become lower than 10 to proceed with SCS trial.      Interval History 8/12/2022:  Mrs Waters presents for delayed FU. She has had continuous pain to lumbar and radicular pain but also peripheral neuropathy complaints. Over interval she has had CVA but doing fair. Her A1C has unfortunately elevated above 10 at this time and SCS trial placed on hold but phychiatric evaluation complete. She continues to take Norco 10/325mg TID, Robaxin 500mg TID and Lyrica 150mg TID with some benefit and denies SE of medications. No s/s concerning for cauda equina.      Interval History  4/12/2022:  Patient returns to clinic today for follow-up. Her neuropathic pain continues to be an issue for her, but she says that she is able to manage. She is taking Norco 10-325mg TID, Robaxin 500mg TID, and Lyrica 150mg TID without any adverse side effects. She denies any changes in her symptoms. She would like to proceed with SCS trial. Discussed last time that her HbA1c should be <10, was 9.8 on most recent labs. Seen by psychology and cleared for SCS.      Interval History 2/14/2022:  Mrs Waters presents for follow up. Pt states overall neuropathy continues. Since last visit she has been stable with medication mgt and takign Norco 10/325mg TID, Robaxin 500mg TID and Lyrica 150mg TID. She denies new areas of pain or neurological changes. Medication adequate to control pain without adverse SE.      Interval History 12/21/2021:  Pt presents for urgent evaluation s/p fall in kitchen last night. Pt unsure of LOC or head trauma but states some amnesia of events. Pt is having left knee pain, B ankle pain L>R and lower back/buttock pain. Daughter further states tremor like activity last night. During visit daughter asked for bedside commode due to inability to ambulate.  Pt during visit appeared somnolent, pale and began vomiting. Discussed going to ER for further evaluation.      Interval History 12/14/2021:  Mrs Waters presents for follow up of chronic pain complaints. She is hopeful she will have A1C lower soon to move forward with SCS. She is doing fair with medication mgt alone at this time and denies SE of medications. Pt is currently taking Norco 10/325mg TID PRN, Lyrica 150mg TID, and Robaxin 500mg TID. She denies new areas of pain or neurological changes.      Interval History 10/14/2021:  The Pt presents for follow up and s/p B Lumbar sympathetic blocks. Pt states this has done well but ready to proceed with SCS trial. She is aware A1C must be under tight control and Pt and daughter re-iterate knowing this.  Pt also mentions DKA admission and diagnosis of vasculitis as well over interval. Pt continues to take hydrocodone 10/325 mg TID PRN, Lyrica 150 mg TID, and Robaxin 500mg TID. She denies any SE of medication, denies new neurological changes.      Interval Hx 09/16/2021  Indira Waters presents to the clinic for a follow-up appointment for f/u after bilateral lumbar sympathetic block on 8/25/21.Patient reports >50% relief after lumbar sympathetic block that lasted 2 weeks when she then developed a UTI/DKA, was admitted to the hospital and pain recurred.  Current pain intensity is 8/10.     Interval History 8/12/2021:  Indira Waters presents to the clinic for a follow-up appointment for BLE diabetic neuropathy, painful. Continues with Norco 10/325mg, Lyrica 150mg TID, Robaxin 750mg daily PRN. She had to cancel previously scheduled lumbar sympathetic block 2/2 lithotripsy for painful kidney stones, which have now passed. She still has intermittent pain with urination but overall that pain is improving. She had to cancel previous angiogram for this same reason - this has not been rescheduled yet. She denies any change in her LE pain. Denies any new neurological sxs in BLEs.      Interval History 6/10/21:  Indira Waters presents to the clinic for a 2 week follow-up appointment from lumbar sympathetic nerve block in early May. She reports minimal relief from this intervention, but did note that it helped some, briefly. Since the last visit, Indira Waters states the pain has been persistant. Current pain intensity is 10/10. Patient has chronic generalized diffuse pain, most pronounced in her BL lower extremities 2/2 diabetic neuropathy. HSe states that the pain is a little better than at her last visit. Most days are 10/10, but some days are better than others. Her pain is aggravated by exertion, walking, or sitting/standing for extended periods of time. He pain is mildly improved by rest and medications. She  "is currently taking Lyrica 150 PO TID, Zoloft, and Norco 10-325mg TID PRN. She states that the pain meds are helping but she is still constantly in pain and unable to participate in her ADLs. She has now established care with PCP for assistance w/ poorly controlled T2DM, last A1c 11.4. She has not yet established care w/ Rheumatology for fibromyalgia management.     Interval HPI 4/15/21:  Patient returns for follow up of chronic generalized diffuse pain. At the last visit, had EMG (not yet completed), lumbar and hip x-ray imaging ordered. She was also referred to Rheumatology for fibromyalgia management and referral to PCP for DM2 management and weaning of zoloft for transition to cymbalta for treatment of neuropathy. She had her Lyrica increased to 150mg PO TID, and prescribed Norco 10mg TID with opioid contract signed. She has been taking Norco 10mg TID and it has been helping her "bad" pains but does not take all of her pain away. She has not yet been set up with her new PCP or rheumatologist yet for fibromyalgia. Still continues to have generalized pain everywhere including feet, hips, legs, lower and upper back, neck and shoulder pain. Continues to have neuropathy in the bilateral lower extremities due to uncontrolled DM2.     Initial HPI 3/11/21:  Indira Waters presents to the clinic for the evaluation of chronic pain. Complaining of pain everywhere including feet, legs, hips, lower and upper back, neck, shoulder. Pain started 5+ years ago. Pain 10/10 at worse. She was referred here by her PCP Dr. Ramos who she has been seeing for over 6 years. She states her pain is aggravated by any physical activity/movement. She takes lyrica, robaxin, and Norco 10 TID with mild relief of pain. Per patient, she was referred here by her PCP for medication refill. She has not tried physical therapy for several years, she states it did not help last time she was in PT. She says she is trying to exercise daily by doing yoga. " She walks with a walker. She has numerous comorbidities including DM2 (Last A1C 9+ per prior family medicine note in Jan 2021), fibromyalgia, lupus, DDD. She states she would like to find a new PCP as she no longer lives near her old one. Patient denies night fever/night sweats, urinary incontinence, bowel incontinence and significant weight loss.          10/31/2024    10:55 AM   Last 3 PDI Scores   Pain Disability Index (PDI) 56           Conservative therapy:  PT: July- August 2024   Chiro:  HEP in the past 6 months: patient preforming daily HEP with limited benefit       Treatments / Medications: (Ice/Heat/NSAIDS/APAP/etc):  Norco 10-325mg TID, Robaxin 500mg TID, and Lyrica 150mg TID         Interventional Pain Procedures: (Previous injections)    - L knee injection under US - 3/28/24  - R knee inj under US - 4/18/24   - SCS trial 3/8/23  - reports possible back injection 10+ years ago  - Lumbar sympathetic nerve block 05/05/21 - Dr. Pereira - minimal relief      IMAGING:    EXAMINATION:  MRI LUMBAR SPINE WITHOUT CONTRAST 9/10/24     CLINICAL HISTORY:  Lumbar radiculopathy, symptoms persist with conservative treatment; Radiculopathy, lumbar region     TECHNIQUE:  Multiplanar, multisequence MR images were acquired from the thoracolumbar junction to the sacrum without contrast.     COMPARISON:  Radiographs 09/03/2024     FINDINGS:  Alignment: Normal.     Vertebrae: No fracture or marrow infiltrative process.     Discs: Disc heights are maintained.     Cord: Conus terminates at L1 and appears unremarkable.  Cauda equina appears unremarkable.     Degenerative findings:     T12-S1: No spinal canal stenosis or neuroforaminal narrowing.     Paraspinal muscles & soft tissues: Moderate paraspinal muscle atrophy.  Prominent right extrarenal pelvis.  Several small left-sided renal cysts.     Impression:     1. No significant degenerative changes.  No spinal canal stenosis or neural foraminal narrowing.  2. Moderate  paraspinal muscle atrophy.  3. Prominent right extrarenal pelvis.  Consider further evaluation with retroperitoneal ultrasound.         Past Medical History:   Diagnosis Date    Arthritis     Back pain     Cancer     ovarian    Cervical cancer     Coronary artery disease     Depression     Diabetes mellitus     Fibromyalgia     Heart attack     History of MI (myocardial infarction)     Hyperlipidemia     Hypertension     Lupus     Stroke     slight left sided weakness     Past Surgical History:   Procedure Laterality Date    APPENDECTOMY       SECTION      2    CORONARY ANGIOGRAPHY N/A 2022    Procedure: ANGIOGRAM, CORONARY ARTERY;  Surgeon: Jose L Méndez MD;  Location: [facility] CATH LAB;  Service: Cardiology;  Laterality: N/A;    HYSTERECTOMY      with USO for cervical cancer    INJECTION OF ANESTHETIC AGENT AROUND NERVE Bilateral 2021    Procedure: BLOCK, NERVE, SYMPATHIC;  Surgeon: Holden Pereira MD;  Location: [facility] PAIN MGT;  Service: Pain Management;  Laterality: Bilateral;    INJECTION OF ANESTHETIC AGENT AROUND NERVE N/A 2021    Procedure: BLOCK, NERVE, SYMPATHETIC  need consent;  Surgeon: Holden Pereira MD;  Location: [facility] PAIN MGT;  Service: Pain Management;  Laterality: N/A;    YARED LINK,SWALLOW FUNCTION,CINE/VIDEO RECORD  2021         TONSILLECTOMY      TRIAL OF SPINAL CORD NERVE STIMULATOR N/A 3/8/2023    Procedure: LUMBAR SPINAL CORD STIMULATOR TRIAL NEVRO REP PATIENT STATES SHE NO LONGER TAKES PLAVIX;  Surgeon: Holden Pereira MD;  Location: [facility] PAIN MGT;  Service: Pain Management;  Laterality: N/A;     Social History     Socioeconomic History    Marital status:    Tobacco Use    Smoking status: Former     Current packs/day: 0.00     Types: Cigarettes     Quit date: 2020     Years since quittin.0     Passive exposure: Past    Smokeless tobacco: Never   Substance and Sexual Activity    Alcohol use: Not Currently     Comment: occasionally    Drug use:  Yes     Types: Hydrocodone     Comment: three times a day    Sexual activity: Not Currently   Social History Narrative    Lives with daughter. .      Social Drivers of Health     Financial Resource Strain: Low Risk  (7/20/2024)    Overall Financial Resource Strain (CARDIA)     Difficulty of Paying Living Expenses: Not hard at all   Food Insecurity: No Food Insecurity (7/20/2024)    Hunger Vital Sign     Worried About Running Out of Food in the Last Year: Never true     Ran Out of Food in the Last Year: Never true   Transportation Needs: No Transportation Needs (7/20/2024)    TRANSPORTATION NEEDS     Transportation : No   Physical Activity: Inactive (7/20/2024)    Exercise Vital Sign     Days of Exercise per Week: 0 days     Minutes of Exercise per Session: 0 min   Stress: No Stress Concern Present (7/20/2024)    [employer] - Occupational Stress Questionnaire     Feeling of Stress : Not at all   Housing Stability: Low Risk  (7/20/2024)    Housing Stability Vital Sign     Unable to Pay for Housing in the Last Year: No     Homeless in the Last Year: No     Family History   Problem Relation Name Age of Onset    COPD Mother      Lupus Mother      Hernia Mother      Uterine cancer Mother          vs cervical cancer    Ovarian cancer Mother      Diabetes Father      Coronary artery disease Father      Colon cancer Maternal Grandmother          in her 50's       Review of patient's allergies indicates:   Allergen Reactions    Bleach (sodium hypochlorite) Shortness Of Breath    Nitrofurantoin macrocrystalline Anaphylaxis    Lipitor [atorvastatin] Diarrhea and Rash    Nsaids (non-steroidal anti-inflammatory drug)      Tolerates aspirin      Pcn [penicillins]     Statins-hmg-coa reductase inhibitors     Toradol [ketorolac]        Current Outpatient Medications   Medication Sig    acetaminophen (TYLENOL) 500 MG tablet Take 2 tablets (1,000 mg total) by mouth every 8 (eight) hours as needed  for Pain.    albuterol (ACCUNEB) 1.25 mg/3 mL Nebu Take 3 mLs (1.25 mg total) by nebulization every 6 (six) hours as needed (for wheezing or shortness of breath). Rescue    allopurinoL (ZYLOPRIM) 300 MG tablet Take 1 tablet (300 mg total) by mouth once daily.    aspirin (ECOTRIN) 81 MG EC tablet Take 1 tablet (81 mg total) by mouth once daily.    blood sugar diagnostic Strp To check BG 6 times daily, to use with insurance preferred meter    blood-glucose meter kit To check BG 6 times daily, to use with insurance preferred meter    evolocumab (REPATHA SURECLICK) 140 mg/mL PnIj Inject 1 mL (140 mg total) into the skin every 14 (fourteen) days.    fenofibrate micronized (LOFIBRA) 134 MG Cap Take 1 capsule (134 mg total) by mouth daily with breakfast.    HYDROcodone-acetaminophen (NORCO)  mg per tablet Take 1 tablet by mouth every 8 (eight) hours as needed for Pain.    insulin glargine U-100, Lantus, (LANTUS SOLOSTAR U-100 INSULIN) 100 unit/mL (3 mL) InPn pen Take 20 units in the AM and 26 units in the PM    insulin lispro (HUMALOG KWIKPEN INSULIN) 100 unit/mL pen Inject 10 Units into the skin 3 (three) times daily before meals.    ipratropium-albuteroL (COMBIVENT RESPIMAT)  mcg/actuation inhaler INHALE 1 PUFF INTO THE LUNGS BY MOUTH EVERY 6 HOURS    lancets (TRUEPLUS LANCETS) 30 gauge Misc Check blood sugar 4 times daily    losartan (COZAAR) 50 MG tablet Take 1 tablet (50 mg total) by mouth once daily.    melatonin (MELATIN) 3 mg tablet Take 2 tablets (6 mg total) by mouth nightly as needed for Insomnia.    methocarbamoL (ROBAXIN) 500 MG Tab Take 500 mg by mouth 4 (four) times daily.    methocarbamoL (ROBAXIN) 500 MG Tab TAKE ONE TABLET BY MOUTH 3 TIMES DAILY    metoclopramide HCl (REGLAN) 5 MG tablet TAKE ONE TABLET BY MOUTH FOUR TIMES DAILY AS NEEDED FOR nausea prevention    metoprolol succinate (TOPROL-XL) 100 MG 24 hr tablet Take 1 tablet (100 mg total) by mouth once daily.    miconazole nitrate 2%  (MICOTIN) 2 % Oint Apply topically 2 (two) times daily.    NIFEdipine (PROCARDIA-XL) 30 MG (OSM) 24 hr tablet Take 1 tablet (30 mg total) by mouth 2 (two) times a day.    nitroGLYCERIN (NITROSTAT) 0.4 MG SL tablet DISSOLVE 1 TABLET UNDER THE TONGUE EVERY 5 MINUTES AS NEEDED FOR CHEST PAIN. DO NOT EXCEED A TOTAL OF 3 DOSES IN 15 MINUTES.    pregabalin (LYRICA) 75 MG capsule Take 1 capsule (75 mg total) by mouth 3 (three) times daily.    senna-docusate 8.6-50 mg (PERICOLACE) 8.6-50 mg per tablet Take 1 tablet by mouth 2 (two) times daily as needed for Constipation.    sertraline (ZOLOFT) 100 MG tablet TAKE ONE TABLET BY MOUTH EVERY DAY    teriparatide 20 mcg/dose (620mcg/2.48mL) PnIj Inject 20 mcg into the skin once daily. Discard remainder after 28 days of use.    torsemide (DEMADEX) 20 MG Tab Take 1 tablet (20 mg total) by mouth once daily.     No current facility-administered medications for this visit.       OPIOID MANAGEMENT:  MME: 30  Risk: low  : Reviewed today  Naloxone: n/a  Utox: 9/19/24 reviewed, appropriate  Violations: None  Contract: yes     ROS:  GENERAL: No fever. No chills. No fatigue. Denies weight loss. Denies weight gain.  HEENT: Denies headaches. Denies vision change. Denies eye pain. Denies double vision. Denies ear pain.   CV: Denies chest pain.   PULM: Denies of shortness of breath.  GI: Denies constipation. No diarrhea. No abdominal pain. Denies nausea. Denies vomiting. No blood in stool.  HEME: Denies bleeding problems.  : Denies urgency. No painful urination. No blood in urine. +back pain, knee pain  MS: Denies joint stiffness. Denies joint swelling.    SKIN: Denies rash.   NEURO: Denies seizures. No weakness.  PSYCH:  Denies difficulty sleeping. No anxiety. Denies depression. No suicidal thoughts.       VITALS:   Vitals:    10/31/24 1053   BP: (!) 140/70   Pulse: 74   Weight: 91.6 kg (201 lb 15.1 oz)   PainSc:   8   PainLoc: Knee         PHYSICAL EXAM:   GENERAL: Well appearing, in no  acute distress, alert and oriented x3.  PSYCH:  Mood and affect appropriate.  SKIN: Skin color, texture, turgor normal, no rashes or lesions.  HEENT:  Normocephalic, atraumatic. Cranial nerves grossly intact.  NECK:   Negative for pain to palpation over the cervical paraspinous muscles.   PULM: No evidence of respiratory difficulty, symmetric chest rise.  GI:  Non-distended  BACK:   Normal range of motion.   POSITIVE for pain to palpation over the spinous processes.  POSITIVE for pain to palpation over facet joints.   POSITIVE axial loading test bilateral. L>R   POSITIVE for tenderness over BILATERAL SIJ.  L>R  Unable to perform SI provacative maneuvers due to patient's pain  POSITIVE MULTIFIDUS LIFT TEST  POSITIVE PRONE INSTABILITY TEST  EXTREMITIES:   No deformities, edema, or skin discoloration.   No atrophy is noted.   POSITIVE for LEFT knee limited ROM with tenderness on palpation and crepitus on movement  +TTP on L GTP  + L piriformis tenderness to palpation  NEURO: Sensation is equal and appropriate bilaterally. Bilateral upper and lower extremity strength is normal and symmetric. Bilateral upper and lower extremity coordination and muscle stretch reflexes are physiologic and symmetric. Plantar response are downgoing. Straight leg raising in the sitting position is POSITIVE to radicular pain.   GAIT: Antalgic gait              ASSESSMENT: 79 y.o. year old with lower back pain, consistent with:    1. Primary osteoarthritis of right knee        2. Primary osteoarthritis of left knee        3. Atrophy of muscle of back at lumbosacral level        4. Chronic pain syndrome                  PLAN:    Patient Education:  Discussed the importance of physical therapy, activity modification, and a home exercise plan to help with pain and improve health.  Therapy referral:  continue HEP, home PT  Medications:   Patient can continue with pain medications for now per their provider since they are providing benefits, using  them appropriately, and without side effects.  Recent rx for Norco   Schedule pain intervention for: Reactive8 implant   Future consideration:   Counseled patient: regarding the importance of activity modification and physical therapy.  Return to clinic: 3 months or prn  Performed BILATERAL knee injections under US in clinic today     I have personally reviewed the history and exam of this patient and agree with the resident/fellow/NPs note as stated above.    Holden Pereira MD      Patient Name: Indira Waters  MRN: [id number]    INFORMED CONSENT: The procedure, risks, benefits and options were discussed with patient. There are no contraindications to the procedure. The patient expressed understanding and agreed to proceed. The personnel performing the procedure was discussed. I verify that I personally obtained Indira's consent prior to the start of the procedure and the signed consent can be found on the patient's chart.    Procedure Date: 10/31/2024    Anesthesia: Topical    Pre Procedure diagnosis:   1. Primary osteoarthritis of right knee    2. Primary osteoarthritis of left knee    3. Atrophy of muscle of back at lumbosacral level    4. Chronic pain syndrome      Post-Procedure diagnosis: same      Sedation: None    PROCEDURE: BILATERAL KNEE INTRAARTICULAR STEROID INJECTION under ultrasound guide    Patient in the supine position with right knee bending 45 degrees, the area of the bilateral knees was prepped with chlorhexidine .  After performing time out and palpating the superior-lateral pattellar aspect A high-frequency linear transducer ultrasound probe is placed in the superolateral corner of the patella, directed medially toward the patellofemoral joint space  .  A 27 Gauge 1.5 inch needle was slowly advance Using an in-plane approach, and directed into the suprapatellar joint space towards the knee joint. After negative aspiration 5 cc of bupivicaine 0.25% and 40 mg kenalog was injected in  each Knee joint.      No complications. Patient tolerated procedure well.    Blood Loss: Nill  Specimen: None            Holden Pereira MD    10/31/2024

## 2024-11-08 ENCOUNTER — TELEPHONE (OUTPATIENT)
Dept: PAIN MEDICINE | Facility: CLINIC | Age: 79
End: 2024-11-08
Payer: MEDICARE

## 2024-11-13 DIAGNOSIS — E11.65 TYPE 2 DIABETES MELLITUS WITH HYPERGLYCEMIA, WITH LONG-TERM CURRENT USE OF INSULIN: ICD-10-CM

## 2024-11-13 DIAGNOSIS — Z79.4 TYPE 2 DIABETES MELLITUS WITH HYPERGLYCEMIA, WITH LONG-TERM CURRENT USE OF INSULIN: ICD-10-CM

## 2024-11-13 NOTE — TELEPHONE ENCOUNTER
No care due was identified.  Health [facility] Embedded Care Due Messages. Reference number: [id number].   11/13/2024 4:56:26 PM CST

## 2024-11-13 NOTE — TELEPHONE ENCOUNTER
----- Message from Nancie sent at 11/13/2024  4:20 PM CST -----  Regarding: Refill  Type: RX Refill Request    Who Called:Esha(Daughter)    RX Name and Strength:insulin lispro (HUMALOG KWIKPEN INSULIN) 100 unit/mL pen    Preferred Pharmacy with phone number: [facility] [address] [place] [address]        Would the patient rather a call back or a response via [facility]?call back     Best Call Back Number:[phone]    Additional Information:

## 2024-11-14 DIAGNOSIS — Z79.4 TYPE 2 DIABETES MELLITUS WITH HYPERGLYCEMIA, WITH LONG-TERM CURRENT USE OF INSULIN: ICD-10-CM

## 2024-11-14 DIAGNOSIS — E11.65 TYPE 2 DIABETES MELLITUS WITH HYPERGLYCEMIA, WITH LONG-TERM CURRENT USE OF INSULIN: ICD-10-CM

## 2024-11-14 RX ORDER — INSULIN LISPRO 100 [IU]/ML
10 INJECTION, SOLUTION INTRAVENOUS; SUBCUTANEOUS
Qty: 27 ML | Refills: 3 | Status: SHIPPED | OUTPATIENT
Start: 2024-11-14 | End: 2025-11-14

## 2024-11-14 RX ORDER — INSULIN LISPRO 100 [IU]/ML
10 INJECTION, SOLUTION INTRAVENOUS; SUBCUTANEOUS
Qty: 27 ML | Refills: 3 | OUTPATIENT
Start: 2024-11-14 | End: 2025-11-14

## 2024-11-14 NOTE — TELEPHONE ENCOUNTER
No care due was identified.  Health [facility] Embedded Care Due Messages. Reference number: [id number].   11/14/2024 4:16:17 PM CST

## 2024-11-14 NOTE — TELEPHONE ENCOUNTER
Refill Encounter    PCP Visits: Recent Visits  Date Type Provider Dept   08/23/24 Office Visit Chun Zapata MD [facility]   07/25/24 Office Visit Chun Zapata MD [facility]   06/20/24 Office Visit Chun Zapata MD [facility]   06/05/24 Office Visit Chun Zapata MD [facility]   02/27/24 Office Visit Chun Zapata MD [facility]   Showing recent visits within past 360 days and meeting all other requirements  Future Appointments  Date Type Provider Dept   03/06/25 Appointment Chun Zapata MD [facility]   Showing future appointments within next 720 days and meeting all other requirements     Last 3 Blood Pressure:   BP Readings from Last 3 Encounters:   10/31/24 (!) 140/70   10/30/24 120/78   09/24/24 (!) 142/68     Preferred Pharmacy:     [facility] #[id number] - [place], [place] - [address] AT [address] [place] [address]  Phone: [phone] Fax: [phone]    Requested RX:  Requested Prescriptions     Pending Prescriptions Disp Refills    insulin lispro (HUMALOG KWIKPEN INSULIN) 100 unit/mL pen 27 mL 3     Sig: Inject 10 Units into the skin 3 (three) times daily before meals.      RX Route: Normal

## 2024-11-14 NOTE — TELEPHONE ENCOUNTER
"----- Message from Ramin sent at 11/14/2024  3:53 PM CST -----  Refill Request    Name Of Caller: [facility] #[id number] [place], [place]    Contact Preference?: [phone]  Fax: [phone]    Provider: Jodi    What is the nature of the call?: Requesting refill for insulin lispro (HUMALOG KWIKPEN INSULIN) 100 unit/mL pen    [facility] #[id number] - [place], [place] - [address] AT [address] [place] [address]  Phone: [phone] Fax: [phone]    Additional Notes:  "Thank you for all that you do for our patients"  "

## 2024-11-15 ENCOUNTER — TELEPHONE (OUTPATIENT)
Dept: INTERNAL MEDICINE | Facility: CLINIC | Age: 79
End: 2024-11-15
Payer: MEDICARE

## 2024-11-15 VITALS — SYSTOLIC BLOOD PRESSURE: 118 MMHG | DIASTOLIC BLOOD PRESSURE: 64 MMHG

## 2024-11-15 DIAGNOSIS — Z79.4 TYPE 2 DIABETES MELLITUS WITH HYPERGLYCEMIA, WITH LONG-TERM CURRENT USE OF INSULIN: Primary | ICD-10-CM

## 2024-11-15 DIAGNOSIS — E11.65 TYPE 2 DIABETES MELLITUS WITH HYPERGLYCEMIA, WITH LONG-TERM CURRENT USE OF INSULIN: Primary | ICD-10-CM

## 2024-11-15 RX ORDER — BLOOD SUGAR DIAGNOSTIC
1 STRIP MISCELLANEOUS DAILY
Qty: 200 EACH | Refills: 11 | Status: SHIPPED | OUTPATIENT
Start: 2024-11-15

## 2024-11-15 NOTE — TELEPHONE ENCOUNTER
----- Message from Nithya sent at 11/15/2024  9:28 AM CST -----  Regarding: refilll  Name of caller: daughter ( Esha )       What is the requesting detail: Pharmacy stated they did not received the prescription for insulin lispro (HUMALOG KWIKPEN INSULIN) 100 unit/mL pen. Please resend       [facility] #[id number] - [place], [place] - [address] AT [facility]          Can the clinic reply by [facility]:       What number to call back: [phone]

## 2024-11-15 NOTE — TELEPHONE ENCOUNTER
No care due was identified.  [facility] Embedded Care Due Messages. Reference number: [id number].   11/15/2024 10:31:45 AM CST

## 2024-11-15 NOTE — TELEPHONE ENCOUNTER
----- Message from Valeria Yanez sent at 11/15/2024  9:43 AM CST -----  Regarding: Medication  refill  Request          Reply in [facility]: NO      Please refill the medication listed below. Please call the patient    [phone] (M)       Medication     BD Ultra Fine Micro pen needles 6 Militare 32 yazmin       Preferred Pharmacy: [facility] #[id number] - [place], [place] - [address] AT [facility]   Phone: [phone]  Fax: [phone]

## 2024-11-15 NOTE — TELEPHONE ENCOUNTER
----- Message from Marissa sent at 11/15/2024  9:41 AM CST -----  Regarding: call back  Type: Patient Call Back    Who called: [facility] Wilson     What is the request in detail: requesting order for insulin needles be sent to [id number] - [place] [address] AT [facility]   Phone: [phone]      Pt is out of needles and unable to inject insulin she has     Can the clinic reply by [facility]?no    Would the patient rather a call back or a response via [facility]? call    Best call back number: [phone]     Additional Information:

## 2024-11-21 RX ORDER — IPRATROPIUM BROMIDE AND ALBUTEROL 20; 100 UG/1; UG/1
1 SPRAY, METERED RESPIRATORY (INHALATION) EVERY 6 HOURS
Qty: 12 G | Refills: 3 | Status: SHIPPED | OUTPATIENT
Start: 2024-11-21

## 2024-11-21 NOTE — TELEPHONE ENCOUNTER
Refill Decision Note   Indira Waters  is requesting a refill authorization.  Brief Assessment and Rationale for Refill:  Approve     Medication Therapy Plan:         Comments:     Note composed:8:25 AM 11/21/2024             Pt had pink eye a few weeks ago and then he got sick again ,   Mother said he has goopy stuff in eye again  Should she start the drops tonight m

## 2024-11-21 NOTE — TELEPHONE ENCOUNTER
No care due was identified.  Health [facility] Embedded Care Due Messages. Reference number: [id number].   11/20/2024 8:09:21 PM CST

## 2024-11-25 DIAGNOSIS — M17.0 PRIMARY OSTEOARTHRITIS OF BOTH KNEES: ICD-10-CM

## 2024-11-25 DIAGNOSIS — M54.16 LUMBAR RADICULOPATHY, CHRONIC: ICD-10-CM

## 2024-11-25 DIAGNOSIS — E11.40 PAINFUL DIABETIC NEUROPATHY: ICD-10-CM

## 2024-11-25 DIAGNOSIS — G89.4 CHRONIC PAIN DISORDER: Primary | ICD-10-CM

## 2024-11-25 RX ORDER — HYDROCODONE BITARTRATE AND ACETAMINOPHEN 10; 325 MG/1; MG/1
1 TABLET ORAL EVERY 8 HOURS PRN
Qty: 90 TABLET | Refills: 0 | Status: SHIPPED | OUTPATIENT
Start: 2024-11-29 | End: 2024-12-29

## 2024-11-25 NOTE — TELEPHONE ENCOUNTER
Patient requesting refill on Norco  Last office visit 10-31-24   shows last refill on 10-30-24  Patient does have a pain contract on file with [facility]  Patient last UDS 9-19-24 was consistent with current therapy     CODEINE  Not Detected     Not Detected CM    Comment: INTERPRETIVE INFORMATION: Codeine, U  Positive Cutoff: 40 ng/mL  Methodology: Mass Spectrometry   MORPHINE  Not Detected     Not Detected CM    Comment: INTERPRETIVE INFORMATION:Morphine, U  Positive Cutoff: 20 ng/mL  Methodology: Mass Spectrometry   6-ACETYLMORPHINE  Not Detected     Not Detected CM    Comment: INTERPRETIVE INFORMATION:6-acetylmorphine, U  Positive Cutoff: 20 ng/mL  Methodology: Mass Spectrometry   OXYCODONE  Not Detected     Not Detected CM    Comment: INTERPRETIVE INFORMATION:Oxycodone, U  Positive Cutoff: 40 ng/mL  Methodology: Mass Spectrometry   NOROYXCODONE  Not Detected     Not Detected CM    Comment: INTERPRETIVE INFORMATION:Noroxycodone, U  Positive Cutoff: 100 ng/mL  Methodology: Mass Spectrometry   OXYMORPHONE  Not Detected     Not Detected CM    Comment: INTERPRETIVE INFORMATION:Oxymorphone, U  Positive Cutoff: 40 ng/mL  Methodology: Mass Spectrometry   NOROXYMORPHONE  Not Detected     Not Detected CM    Comment: INTERPRETIVE INFORMATION:Noroxymorphone, U  Positive Cutoff: 100 ng/mL  Methodology: Mass Spectrometry   HYDROCODONE  Present     Present CM    Comment: INTERPRETIVE INFORMATION:Hydrocodone, U  Positive Cutoff: 40 ng/mL  Methodology: Mass Spectrometry   NORHYDROCODONE  Present     Present CM    Comment: INTERPRETIVE INFORMATION:Norhydrocodone, U  Positive Cutoff: 100 ng/mL  Methodology: Mass Spectrometry   HYDROMORPHONE  Present     Present CM    Comment: INTERPRETIVE INFORMATION:Hydromorphone, U  Positive Cutoff: 20 ng/mL  Methodology: Mass Spectrometry   BUPRENORPHINE  Not Detected     Not Detected CM    Comment: INTERPRETIVE INFORMATION:Buprenorphine, U  Positive Cutoff:  5 ng/mL  Methodology: Mass Spectrometry   NORUBPRENORPHINE  Not Detected     Not Detected CM    Comment: INTERPRETIVE INFORMATION:Norbuprenorphine, U  Positive Cutoff: 20 ng/mL  Methodology: Mass Spectrometry   FENTANYL  Not Detected     Not Detected CM    Comment: INTERPRETIVE INFORMATION:Fentanyl, U  Positive Cutoff: 2 ng/mL  Methodology: Mass Spectrometry   NORFENTANYL  Not Detected     Not Detected CM    Comment: INTERPRETIVE INFORMATION:Norfentanyl, U  Positive Cutoff: 2 ng/mL  Methodology: Mass Spectrometry   MEPERIDINE METABOLITE  Not Detected     Not Detected CM    Comment: INTERPRETIVE INFORMATION:Meperidine metabolite, U  Positive Cutoff: 50 ng/mL  Methodology: Mass Spectrometry   TAPENTADOL  Not Detected     Not Detected CM    Comment: INTERPRETIVE INFORMATION:Tapentadol, U  Positive Cutoff: 100 ng/mL  Methodology: Mass Spectrometry   TAPENTADOL-O-SULF  Not Detected     Not Detected CM    Comment: INTERPRETIVE INFORMATION:Tapentadol-o-Sulf, U  Positive Cutoff: 200 ng/mL  Methodology: Mass Spectrometry   METHADONE  Negative     Negative CM    Comment: Presumptive negative by immunoassay. Testing by mass  spectrometry is available on request.  INTERPRETIVE INFORMATION: Methadone Screen, U  Positive Cutoff: 150 ng/mL  Methodology: Immunoassay   TRAMADOL  Negative     Negative CM    Comment: Presumptive negative by immunoassay. Testing by mass  spectrometry is available on request.  INTERPRETIVE INFORMATION:Tramadol Screen, U  Positive Cutoff: 100 ng/mL  Methodology: Immunoassay   AMPHETAMINE  Not Detected     Not Detected CM    Comment: INTERPRETIVE INFORMATION:Amphetamine, U  Positive Cutoff: 50 ng/mL  Methodology: Mass Spectrometry   METHAMPHETAMINE  Not Detected     Not Detected CM    Comment: INTERPRETIVE INFORMATION:Methamphetamine, U  Positive Cutoff: 200 ng/mL  Methodology: Mass Spectrometry   MDMA- ECSTASY  Not Detected     Not Detected CM    Comment: INTERPRETIVE INFORMATION:MDMA, U  Positive  Cutoff: 200 ng/mL  Methodology: Mass Spectrometry   MDA  Not Detected     Not Detected CM    Comment: INTERPRETIVE INFORMATION:MDA, U  Positive Cutoff: 200 ng/mL  Methodology: Mass Spectrometry   MDEA- Mary  Not Detected     Not Detected CM    Comment: INTERPRETIVE INFORMATION:MDEA, U  Positive Cutoff: 200 ng/mL  Methodology: Mass Spectrometry   METHYLPHENIDATE  Not Detected     Not Detected CM    Comment: INTERPRETIVE INFORMATION:Methylphenidate, U  Positive Cutoff: 100 ng/mL  Methodology: Mass Spectrometry   PHENTERMINE  Not Detected     Not Detected CM    Comment: INTERPRETIVE INFORMATION:Phentermine, U  Positive Cutoff: 100 ng/mL  Methodology: Mass Spectrometry   BENZOYLECGONINE  Negative     Negative CM    Comment: Presumptive negative by immunoassay. Testing by mass  spectrometry is available on request.  INTERPRETIVE INFORMATION:Cocaine Screen, U  Positive Cutoff: 150 ng/mL  Methodology: Immunoassay   ALPRAZOLAM  Not Detected     Not Detected CM    Comment: INTERPRETIVE INFORMATION:Alprazolam, U  Positive Cutoff: 40 ng/mL  Methodology: Mass Spectrometry   ALPHA-OH-ALPRAZOLAM  Not Detected     Not Detected CM    Comment: INTERPRETIVE INFORMATION:Alpha-OH-Alprazolam, U  Positive Cutoff: 20 ng/mL  Methodology: Mass Spectrometry   CLONAZEPAM  Not Detected     Not Detected CM    Comment: INTERPRETIVE INFORMATION:Clonazepam, U  Positive Cutoff: 20 ng/mL  Methodology: Mass Spectrometry   7-AMINOCLONAZEPAM  Not Detected     Not Detected CM    Comment: INTERPRETIVE INFORMATION:7-Aminoclonazepam, U  Positive Cutoff: 40 ng/mL  Methodology: Mass Spectrometry   DIAZEPAM  Not Detected     Not Detected CM    Comment: INTERPRETIVE INFORMATION:Diazepam, U  Positive Cutoff: 50 ng/mL  Methodology: Mass Spectrometry   NORDIAZEPAM  Not Detected     Not Detected CM    Comment: INTERPRETIVE INFORMATION:Nordiazepam, U  Positive Cutoff: 50 ng/mL  Methodology: Mass Spectrometry   OXAZEPAM  Not Detected     Not Detected CM    Comment:  INTERPRETIVE INFORMATION:Oxazepam, U  Positive Cutoff: 50 ng/mL  Methodology: Mass Spectrometry   TEMAZEPAM  Not Detected     Not Detected CM    Comment: INTERPRETIVE INFORMATION:Temazepam, U  Positive Cutoff: 50 ng/mL  Methodology: Mass Spectrometry   Lorazepam  Not Detected     Not Detected CM    Comment: INTERPRETIVE INFORMATION:Lorazepam, U  Positive Cutoff: 60 ng/mL  Methodology: Mass Spectrometry   MIDAZOLAM  Not Detected     Not Detected CM    Comment: INTERPRETIVE INFORMATION:Midazolam, U  Positive Cutoff: 20 ng/mL  Methodology: Mass Spectrometry   ZOLPIDEM  Not Detected     Not Detected CM    Comment: INTERPRETIVE INFORMATION:Zolpidem, U  Positive Cutoff: 20 ng/mL  Methodology: Mass Spectrometry   BARBITURATES  Negative     Negative CM    Comment: Presumptive negative by immunoassay. Testing by mass  spectrometry is available on request.  INTERPRETIVE INFORMATION:Barbiturates Screen, U  Positive Cutoff: 200 ng/mL  Methodology: Immunoassay   Creatinine, Urine 20.0 - 400.0 mg/dL <20.0 15.1 R 15.1 R 38.4 R 37.4 R <20.0 CM 24.0 R   Comment: Creatinine <20 mg/dL is consistent with a dilute urine  specimen. Recollection to obtain a more concentrated  specimen, such as a first morning void, may be considered  if unexpected negative urine drug screening results were  obtained.   ETHYL GLUCURONIDE  Negative     Negative CM    Comment: Presumptive negative by immunoassay. Testing by mass  spectrometry is available on request.  INTERPRETIVE INFORMATION:Ethyl Glucuronide Screen, U  Positive Cutoff: 500 ng/mL  Methodology: Immunoassay   MARIJUANA METABOLITE  Negative     Negative CM    Comment: Presumptive negative by immunoassay. Testing by mass  spectrometry is available on request.  INTERPRETIVE INFORMATION: THC (Cannabinoids) Screen, U  Positive Cutoff: 50 ng/mL  Methodology: Immunoassay   PCP  Negative     Negative CM    Comment: Presumptive negative by immunoassay. Testing by mass  spectrometry is available on  request.  INTERPRETIVE INFORMATION:Phencyclidine Screen, U  Positive Cutoff: 25 ng/mL  Methodology: Immunoassay   CARISOPRODOL  Negative     Negative CM    Comment: Presumptive negative by immunoassay. Testing by mass  spectrometry is available on request.  INTERPRETIVE INFORMATION: Carisoprodol Screen, U  Positive Cutoff: 100 ng/mL  Methodology: Immunoassay  The carisoprodol immunoassay has cross-reactivity to  carisoprodol and meprobamate.   Naloxone  Not Detected     Not Detected CM    Comment: INTERPRETIVE INFORMATION:Naloxone, U  Positive Cutoff: 100 ng/mL  Methodology: Mass Spectrometry   Gabapentin  Not Detected     Not Detected CM    Comment: INTERPRETIVE INFORMATION:Gabapentin, U  Positive Cutoff: 3,000 ng/mL  Methodology: Mass Spectrometry   Pregabalin  Present     Present CM    Comment: INTERPRETIVE INFORMATION:Pregabalin, U  Positive Cutoff: 3,000 ng/mL  Methodology: Mass Spectrometry   Alpha-OH-Midazolam  Not Detected     Not Detected CM    Comment: INTERPRETIVE INFORMATION:Alpha-OH-Midazolam, U  Positive Cutoff: 20 ng/mL  Methodology: Mass Spectrometry   Zolpidem Metabolite  Not Detected     Not Detected CM    Comment: INTERPRETIVE INFORMATION:Zolpidem Metabolite, U  Positive Cutoff: 100 ng/mL  Methodology: Mass Spectrometry   PAIN MANAGEMENT DRUG PANEL  See Below     See Below CM    Comment: Methodology: Qualitative Enzyme Immunoassay and Qualitative  Liquid Chromatography-Tandem Mass Spectrometry,  Quantitative Spectrophotometry  The absence of expected drug(s) and/or drug metabolite(s)  may indicate non-compliance, inappropriate timing of  specimen collection relative to drug administration, poor  drug absorption, diluted/adulterated urine, or limitations  of testing. The concentration must be greater than or equal  to the cutoff to be reported as present.  If specific drug  concentrations are required, contact the laboratory within  two weeks of specimen collection to request quantification  by a  second analytical technique. Interpretive questions  should be directed to the laboratory.  Results based on immunoassay detection that do not match  clinical expectations should be  interpreted with caution. Confirmatory testing by mass  spectrometry for immunoassay-based results is available, if  ordered within two weeks of specimen collection. Additional  charges apply.  For medical purposes only; not valid for forensic use.  This test was developed and its performance characteristics  determined by [employer]. It has not been cleared or  approved by the [place] Food and Drug Administration. This test  was performed in a CLIA certified laboratory and is  intended for clinical purposes.

## 2024-11-26 NOTE — PROGRESS NOTES
Subjective:      Patient ID: Indira Waters is a 79 y.o. female.    Chief Complaint:  Follow-up and Type 2 diabetes mellitus with hyperglycemia, with long-term    History of Present Illness  Indira Waters is here for follow up of Osteoporosis and DM.  Previously seen by me 9/2023.  This is a MyChart video visit.    The patient location is: [place]  The chief complaint leading to consultation is: Osteoporosis  Visit type: Virtual visit with synchronous audio and video  Total time spent with patient: see below  Each patient to whom he or she provides medical services by telemedicine is:  (1) informed of the relationship between the physician and patient and the respective role of any other health care provider with respect to management of the patient; and (2) notified that he or she may decline to receive medical services by telemedicine and may withdraw from such care at any time.    With regards to Osteoporosis:     BMD:   7/14/2022  The bone mineral density within the lumbar spine measured from L1 through L4 is equal to 0.821 g/cm squared with a T score of -3.0 and a Z score of -1.9.  The left femoral neck bone mineral density is equal to 0.567 g/cm squared with a T score of -3.4 and a Z score of -1.8.  The right femoral neck bone mineral density is equal to 0.546 g/cm squared with a T score of -3.5 and a Z score of -2.0.  The left total hip bone mineral density is equal to 0.612 g/cm squared with a T score of -3.1 and a Z score of -1.8.  The right total hip bone mineral density is equal to 0.624 g/cm squared with a T score of -3.0 and a Z score of -1.7.  There is a 37.0% risk of a major osteoporotic fracture and a 16.7% risk of hip fracture in the next 10 years (FRAX).  Impression:  Osteoporosis    Medications:   Forteo  Start 10/2022  Stop: 10/2024     Calcium intake: None  Vit D intake: OTC Vit D3 1000iu daily     Weight bearing exercise: None   Falls: Denies any in the last 12 months  Fractures: Denies  any in the last 12 months   Significant height loss (>2 inches): Unsure     Family history: GM, Mother    Tobacco Use: Denies  Alcohol Use: Denies  Glucocorticoid History: Denies   Anticoagulant Use: Aspirin  GERD/PPI Use: Yes  History of Malabsorption: Denies  Antiseizure Medications: Denies  History of Thyroid Disease: Denies  Kidney Disease: Yes  Personal history of kidney stones: in the past   MI: 2022  Stroke: 2022  Dental Visit: Dentures     Lab Results   Component Value Date    CALCIUM 9.2 12/03/2024    PHOS 4.9 (H) 07/18/2024     Vit D, 25-Hydroxy   Date Value Ref Range Status   07/25/2024 52 30 - 96 ng/mL Final     Comment:     Vitamin D deficiency.........<10 ng/mL                              Vitamin D insufficiency......10-29 ng/mL       Vitamin D sufficiency........> or equal to 30 ng/mL  Vitamin D toxicity............>100 ng/mL         With regards to Diabetes:    Diagnosed: 2015  Education - last visit: in the past  Known complications:  DKA 2015  RN +  Eye Exam: 1/2023  PN +  Podiatry: 6/2021  Nephropathy +  CAD +  Denies history of pancreatitis & personal/family history of medullary thyroid cancer.     Diet/Exercise:  Eats 3 meals a day.   Snacks : yes.   Drinks : water, milk.  Exercise: None   Recent illness, injury, steroids: Denies     Current regimen:  Lantus 20 units in AM and 26 units in PM   Humalog 13-13-11 units plus correction scale before meals only  Add correction scale as needed.  Blood sugar 200 to 250 add 1 units  Blood sugar 251 to 300 add 2 units  Blood sugar greater than 301 add 3 units    Reports compliance.     Other medications tried:  Metformin- stopped for unclear reasons but maybe related to kidney disease  Actos  Insulin 70/30  Januvia     Glucose Monitor: Dexcom G7  - she said she has the demarcus on her phone but unsure how to set it up  5 times a day testing  Oral recall - writing down glucose readings    Hypoglycemia:  Yes - timing varies - mostly at night   Knows how  to correct with 15 grams of carbs- juice, coke, or a peppermint.     Diabetes Management Status    Hemoglobin A1C   Date Value Ref Range Status   09/24/2024 7.5 (H) 4.0 - 5.6 % Final     Comment:     ADA Screening Guidelines:  5.7-6.4%  Consistent with prediabetes  >or=6.5%  Consistent with diabetes    High levels of fetal hemoglobin interfere with the HbA1C  assay. Heterozygous hemoglobin variants (HbS, HgC, etc)do  not significantly interfere with this assay.   However, presence of multiple variants may affect accuracy.     07/25/2024 8.2 (H) 4.0 - 5.6 % Final     Comment:     ADA Screening Guidelines:  5.7-6.4%  Consistent with prediabetes  >or=6.5%  Consistent with diabetes    High levels of fetal hemoglobin interfere with the HbA1C  assay. Heterozygous hemoglobin variants (HbS, HgC, etc)do  not significantly interfere with this assay.   However, presence of multiple variants may affect accuracy.     06/05/2024 9.5 (H) 4.0 - 5.6 % Final     Comment:     ADA Screening Guidelines:  5.7-6.4%  Consistent with prediabetes  >or=6.5%  Consistent with diabetes    High levels of fetal hemoglobin interfere with the HbA1C  assay. Heterozygous hemoglobin variants (HbS, HgC, etc)do  not significantly interfere with this assay.   However, presence of multiple variants may affect accuracy.         Statin: Not taking  ACE/ARB: Taking  Screening or Prevention Patient's value Goal Complete/Controlled?   HgA1C Testing and Control   Lab Results   Component Value Date    HGBA1C 7.5 (H) 09/24/2024      Annually/Less than 8% No   Lipid profile : 07/25/2024 Annually Yes   LDL control Lab Results   Component Value Date    LDLCALC 119.8 07/25/2024    Annually/Less than 100 mg/dl  No   Nephropathy screening Lab Results   Component Value Date    LABMICR 10.0 09/24/2024     Lab Results   Component Value Date    PROTEINUA Negative 09/24/2024    Annually Yes   Blood pressure BP Readings from Last 1 Encounters:   11/15/24 118/64    Less than  140/90 Yes   Dilated retinal exam : 01/10/2023 Annually Yes   Foot exam   Most Recent Foot Exam Date: Not Found Annually Yes       Review of Systems  As above    There were no vitals taken for this visit.      There is no height or weight on file to calculate BMI.    Lab Review:   Lab Results   Component Value Date    HGBA1C 7.5 (H) 09/24/2024    HGBA1C 8.2 (H) 07/25/2024    HGBA1C 9.5 (H) 06/05/2024       Lab Results   Component Value Date    CHOL 236 (H) 07/25/2024    HDL 40 07/25/2024    LDLCALC 119.8 07/25/2024    TRIG 381 (H) 07/25/2024    CHOLHDL 16.9 (L) 07/25/2024     Lab Results   Component Value Date     (L) 12/03/2024    K 4.2 12/03/2024     12/03/2024    CO2 20 (L) 12/03/2024     (H) 12/03/2024    BUN 21 12/03/2024    CREATININE 1.2 12/03/2024    CALCIUM 9.2 12/03/2024    PROT 6.7 12/03/2024    ALBUMIN 3.6 12/03/2024    BILITOT 0.2 12/03/2024    ALKPHOS 61 12/03/2024    AST 14 12/03/2024    ALT 14 12/03/2024    ANIONGAP 12 12/03/2024    ESTGFRAFRICA 42 (A) 07/14/2022    EGFRNONAA 37 (A) 07/14/2022    TSH 2.059 06/05/2024     Vit D, 25-Hydroxy   Date Value Ref Range Status   07/25/2024 52 30 - 96 ng/mL Final     Comment:     Vitamin D deficiency.........<10 ng/mL                              Vitamin D insufficiency......10-29 ng/mL       Vitamin D sufficiency........> or equal to 30 ng/mL  Vitamin D toxicity............>100 ng/mL       Assessment and Plan     1. Type 2 diabetes mellitus with chronic kidney disease, with long-term current use of insulin, unspecified CKD stage  Ambulatory referral/consult to Diabetes Education      2. Age-related osteoporosis without current pathological fracture  DXA Bone Density Axial Skeleton 1 or more sites      3. Stage 3a chronic kidney disease  insulin glargine U-100, Lantus, (LANTUS SOLOSTAR U-100 INSULIN) 100 unit/mL (3 mL) InPn pen      4. Type 2 diabetes mellitus with hyperglycemia, with long-term current use of insulin  insulin lispro (HUMALOG  "KWIKPEN INSULIN) 100 unit/mL pen    BD ULTRA-FINE MICRO PEN NEEDLE 32 gauge x 1/4" Ndle          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 disease  -- RTC in 3 months.  -- A1c goal < or = 8%.  -- Medications discussed:  MFM - CKD  Insulin   -- Reviewed logs/CGM:  Glucose variable - notable for hypoglycemia at night per patient recall.  Will try to get Dexcom connected on phone.  Instructed to send glucose logs in 7-14 days.  Reach out to me sooner for any glucose <70 or consistently >200.  -- Medication Changes:   Lantus 20 units in AM and 20 units in PM   Humalog 13-13-11 units plus correction scale before meals only  Add correction scale as needed.  Blood sugar 200 to 250 add 1 units  Blood sugar 251 to 300 add 2 units  Blood sugar greater than 301 add 3 units    -- Reviewed goals of therapy are to get the best control we can without hypoglycemia.  -- Reviewed patient's current insulin regimen. Clarified proper insulin dose and timing in relation to meals, etc. Insulin injection sites and proper rotation instructed.    -- Advised frequent self blood glucose monitoring.  Patient encouraged to document glucose results and bring them to every clinic visit.  -- Hypoglycemia precautions discussed. Instructed on precautions before driving.    -- Call for Bg repeatedly < 90 or > 180.   -- Close adherence to lifestyle changes recommended.   -- Periodic follow ups for eye evaluations, foot care and dental care suggested.    Age-related osteoporosis without current pathological fracture  -- Risks include  race, menopause, history of smoking,  +FH, PPI therapy.  -- Reviewed basics of quantity versus quality.  -- Reassuring they are not fracturing.  -- Recommend:  -Pharmacological therapy is recommended for patients with osteopenia if the 10 year probability of a hip fracture is >3% and 10 year probability of other major osteoporotic fractures is >20%.  Treatment options and potential side effects " discussed for PO bisphosphonates, reclast, Denosumab, and Teriparatide.   -Treatment:   Forteo   Start 10/2022  Stop 10/2024  Start Prolia every 6 months  -Calcium and Vitamin D RDD provided.  -Exercise: recommended.  -Fall precautions made in the home.  -Alerted that if dental work needs to be done it should be done prior to initiating therapy.   -- Repeat DEXA scan 7/2024.    Stage 3 chronic kidney disease  -- Optimize glucose control.         Follow up in about 3 months (around 3/3/2025).      I spent 30 minutes face-to-face with the patient, over half of the visit was spent on counseling and/or coordinating the care of the patient.    Counseling includes:  Diagnostic results, impressions, recommendations   Prognosis   Risk and benefits of management/treatment options   Instructions for management treatment and or follow-up   Importance of compliance with management   Risk factor reduction   Patient education    Visit today included increased complexity associated with the care of the problems addressed and managing the longitudinal care of the patient due to the serious and/or complex managed problems.

## 2024-12-02 DIAGNOSIS — E11.65 TYPE 2 DIABETES MELLITUS WITH HYPERGLYCEMIA, WITH LONG-TERM CURRENT USE OF INSULIN: ICD-10-CM

## 2024-12-02 DIAGNOSIS — Z79.4 TYPE 2 DIABETES MELLITUS WITH HYPERGLYCEMIA, WITH LONG-TERM CURRENT USE OF INSULIN: ICD-10-CM

## 2024-12-02 RX ORDER — PEN NEEDLE, DIABETIC 32 GX 1/4"
NEEDLE, DISPOSABLE MISCELLANEOUS
Qty: 400 EACH | Refills: 0 | Status: SHIPPED | OUTPATIENT
Start: 2024-12-02 | End: 2024-12-03 | Stop reason: SDUPTHER

## 2024-12-03 ENCOUNTER — LAB VISIT (OUTPATIENT)
Dept: LAB | Facility: OTHER | Age: 79
End: 2024-12-03
Attending: STUDENT IN AN ORGANIZED HEALTH CARE EDUCATION/TRAINING PROGRAM
Payer: MEDICARE

## 2024-12-03 ENCOUNTER — OFFICE VISIT (OUTPATIENT)
Dept: ENDOCRINOLOGY | Facility: CLINIC | Age: 79
End: 2024-12-03
Payer: MEDICARE

## 2024-12-03 ENCOUNTER — PATIENT MESSAGE (OUTPATIENT)
Dept: ENDOCRINOLOGY | Facility: CLINIC | Age: 79
End: 2024-12-03

## 2024-12-03 DIAGNOSIS — N18.31 STAGE 3A CHRONIC KIDNEY DISEASE: ICD-10-CM

## 2024-12-03 DIAGNOSIS — E11.22 TYPE 2 DIABETES MELLITUS WITH CHRONIC KIDNEY DISEASE, WITH LONG-TERM CURRENT USE OF INSULIN, UNSPECIFIED CKD STAGE: Primary | ICD-10-CM

## 2024-12-03 DIAGNOSIS — E11.22 TYPE 2 DIABETES MELLITUS WITH CHRONIC KIDNEY DISEASE, WITH LONG-TERM CURRENT USE OF INSULIN, UNSPECIFIED CKD STAGE: ICD-10-CM

## 2024-12-03 DIAGNOSIS — M81.0 AGE-RELATED OSTEOPOROSIS WITHOUT CURRENT PATHOLOGICAL FRACTURE: ICD-10-CM

## 2024-12-03 DIAGNOSIS — Z79.4 TYPE 2 DIABETES MELLITUS WITH CHRONIC KIDNEY DISEASE, WITH LONG-TERM CURRENT USE OF INSULIN, UNSPECIFIED CKD STAGE: ICD-10-CM

## 2024-12-03 DIAGNOSIS — E11.65 TYPE 2 DIABETES MELLITUS WITH HYPERGLYCEMIA, WITH LONG-TERM CURRENT USE OF INSULIN: ICD-10-CM

## 2024-12-03 DIAGNOSIS — Z79.4 TYPE 2 DIABETES MELLITUS WITH HYPERGLYCEMIA, WITH LONG-TERM CURRENT USE OF INSULIN: ICD-10-CM

## 2024-12-03 DIAGNOSIS — Z79.4 TYPE 2 DIABETES MELLITUS WITH CHRONIC KIDNEY DISEASE, WITH LONG-TERM CURRENT USE OF INSULIN, UNSPECIFIED CKD STAGE: Primary | ICD-10-CM

## 2024-12-03 LAB
ALBUMIN SERPL BCP-MCNC: 3.6 G/DL (ref 3.5–5.2)
ALBUMIN/CREAT UR: 40.7 UG/MG (ref 0–30)
ALP SERPL-CCNC: 61 U/L (ref 40–150)
ALT SERPL W/O P-5'-P-CCNC: 14 U/L (ref 10–44)
ANION GAP SERPL CALC-SCNC: 12 MMOL/L (ref 8–16)
AST SERPL-CCNC: 14 U/L (ref 10–40)
BILIRUB SERPL-MCNC: 0.2 MG/DL (ref 0.1–1)
BUN SERPL-MCNC: 21 MG/DL (ref 8–23)
CALCIUM SERPL-MCNC: 9.2 MG/DL (ref 8.7–10.5)
CHLORIDE SERPL-SCNC: 103 MMOL/L (ref 95–110)
CO2 SERPL-SCNC: 20 MMOL/L (ref 23–29)
CREAT SERPL-MCNC: 1.2 MG/DL (ref 0.5–1.4)
CREAT UR-MCNC: 17.2 MG/DL (ref 15–325)
ERYTHROCYTE [DISTWIDTH] IN BLOOD BY AUTOMATED COUNT: 15.7 % (ref 11.5–14.5)
EST. GFR  (NO RACE VARIABLE): 46 ML/MIN/1.73 M^2
ESTIMATED AVG GLUCOSE: 169 MG/DL (ref 68–131)
GLUCOSE SERPL-MCNC: 163 MG/DL (ref 70–110)
HBA1C MFR BLD: 7.5 % (ref 4–5.6)
HCT VFR BLD AUTO: 29.9 % (ref 37–48.5)
HGB BLD-MCNC: 9.3 G/DL (ref 12–16)
MCH RBC QN AUTO: 28.5 PG (ref 27–31)
MCHC RBC AUTO-ENTMCNC: 31.1 G/DL (ref 32–36)
MCV RBC AUTO: 92 FL (ref 82–98)
MICROALBUMIN UR DL<=1MG/L-MCNC: 7 UG/ML
PLATELET # BLD AUTO: 306 K/UL (ref 150–450)
PMV BLD AUTO: 11.2 FL (ref 9.2–12.9)
POTASSIUM SERPL-SCNC: 4.2 MMOL/L (ref 3.5–5.1)
PROT SERPL-MCNC: 6.7 G/DL (ref 6–8.4)
RBC # BLD AUTO: 3.26 M/UL (ref 4–5.4)
SODIUM SERPL-SCNC: 135 MMOL/L (ref 136–145)
WBC # BLD AUTO: 12.63 K/UL (ref 3.9–12.7)

## 2024-12-03 PROCEDURE — G2211 COMPLEX E/M VISIT ADD ON: HCPCS | Mod: 95,,, | Performed by: NURSE PRACTITIONER

## 2024-12-03 PROCEDURE — 82570 ASSAY OF URINE CREATININE: CPT | Performed by: STUDENT IN AN ORGANIZED HEALTH CARE EDUCATION/TRAINING PROGRAM

## 2024-12-03 PROCEDURE — 83036 HEMOGLOBIN GLYCOSYLATED A1C: CPT | Performed by: STUDENT IN AN ORGANIZED HEALTH CARE EDUCATION/TRAINING PROGRAM

## 2024-12-03 PROCEDURE — 99214 OFFICE O/P EST MOD 30 MIN: CPT | Mod: 95,,, | Performed by: NURSE PRACTITIONER

## 2024-12-03 PROCEDURE — 85027 COMPLETE CBC AUTOMATED: CPT | Performed by: STUDENT IN AN ORGANIZED HEALTH CARE EDUCATION/TRAINING PROGRAM

## 2024-12-03 PROCEDURE — 36415 COLL VENOUS BLD VENIPUNCTURE: CPT | Performed by: STUDENT IN AN ORGANIZED HEALTH CARE EDUCATION/TRAINING PROGRAM

## 2024-12-03 PROCEDURE — 80053 COMPREHEN METABOLIC PANEL: CPT | Performed by: STUDENT IN AN ORGANIZED HEALTH CARE EDUCATION/TRAINING PROGRAM

## 2024-12-03 RX ORDER — INSULIN GLARGINE 100 [IU]/ML
20 INJECTION, SOLUTION SUBCUTANEOUS 2 TIMES DAILY
Qty: 40 ML | Refills: 3 | Status: SHIPPED | OUTPATIENT
Start: 2024-12-03

## 2024-12-03 RX ORDER — PEN NEEDLE, DIABETIC 32 GX 1/4"
NEEDLE, DISPOSABLE MISCELLANEOUS
Qty: 400 EACH | Refills: 0 | Status: SHIPPED | OUTPATIENT
Start: 2024-12-03

## 2024-12-03 RX ORDER — INSULIN LISPRO 100 [IU]/ML
INJECTION, SOLUTION INTRAVENOUS; SUBCUTANEOUS
Qty: 40 ML | Refills: 3 | Status: SHIPPED | OUTPATIENT
Start: 2024-12-03 | End: 2025-12-03

## 2024-12-03 NOTE — ASSESSMENT & PLAN NOTE
-- Risks include  race, menopause, history of smoking,  +FH, PPI therapy.  -- Reviewed basics of quantity versus quality.  -- Reassuring they are not fracturing.  -- Recommend:  -Pharmacological therapy is recommended for patients with osteopenia if the 10 year probability of a hip fracture is >3% and 10 year probability of other major osteoporotic fractures is >20%.  Treatment options and potential side effects discussed for PO bisphosphonates, reclast, Denosumab, and Teriparatide.   -Treatment:   Forteo   Start 10/2022  Stop 10/2024  Start Prolia every 6 months  -Calcium and Vitamin D RDD provided.  -Exercise: recommended.  -Fall precautions made in the home.  -Alerted that if dental work needs to be done it should be done prior to initiating therapy.   -- Repeat DEXA scan 7/2024.

## 2024-12-03 NOTE — Clinical Note
DUY longoria Dexcom - can be virtual - needs help setting it  up on her phone In clinic visit in 3 months  Schedule BMD

## 2024-12-04 ENCOUNTER — TELEPHONE (OUTPATIENT)
Dept: ENDOCRINOLOGY | Facility: CLINIC | Age: 79
End: 2024-12-04
Payer: MEDICARE

## 2024-12-04 ENCOUNTER — PATIENT MESSAGE (OUTPATIENT)
Dept: ENDOCRINOLOGY | Facility: CLINIC | Age: 79
End: 2024-12-04
Payer: MEDICARE

## 2024-12-04 NOTE — TELEPHONE ENCOUNTER
Spoke to patient's daughter regarding appointment set up with educator.  Unfortunately, the patient's phone is not compatible with the Dexcom G7 demarcus. Advised to continue using the .  Also instructed to bring in  for download at each visit. Appointment was cancelled.

## 2024-12-07 NOTE — Clinical Note
The radial band was applied to the right radial artery. 3 cc's of air were inserted into the closure device.  severe

## 2024-12-10 DIAGNOSIS — K31.84 GASTROPARESIS: ICD-10-CM

## 2024-12-10 RX ORDER — METOCLOPRAMIDE 5 MG/1
TABLET ORAL
Qty: 90 TABLET | Refills: 3 | Status: SHIPPED | OUTPATIENT
Start: 2024-12-10

## 2024-12-10 NOTE — TELEPHONE ENCOUNTER
No care due was identified.  [facility] Embedded Care Due Messages. Reference number: [id number].   12/10/2024 8:04:26 AM CST

## 2024-12-16 ENCOUNTER — PATIENT MESSAGE (OUTPATIENT)
Dept: INFECTIOUS DISEASES | Facility: HOSPITAL | Age: 79
End: 2024-12-16
Payer: MEDICARE

## 2024-12-22 DIAGNOSIS — Z79.4 TYPE 2 DIABETES MELLITUS WITH HYPERGLYCEMIA, WITH LONG-TERM CURRENT USE OF INSULIN: ICD-10-CM

## 2024-12-22 DIAGNOSIS — E11.65 TYPE 2 DIABETES MELLITUS WITH HYPERGLYCEMIA, WITH LONG-TERM CURRENT USE OF INSULIN: ICD-10-CM

## 2024-12-23 DIAGNOSIS — M54.16 LUMBAR RADICULOPATHY, CHRONIC: ICD-10-CM

## 2024-12-23 DIAGNOSIS — E11.40 PAINFUL DIABETIC NEUROPATHY: ICD-10-CM

## 2024-12-23 DIAGNOSIS — M17.0 PRIMARY OSTEOARTHRITIS OF BOTH KNEES: ICD-10-CM

## 2024-12-23 DIAGNOSIS — G89.4 CHRONIC PAIN DISORDER: ICD-10-CM

## 2024-12-23 DIAGNOSIS — G89.4 CHRONIC PAIN DISORDER: Primary | ICD-10-CM

## 2024-12-23 RX ORDER — HYDROCODONE BITARTRATE AND ACETAMINOPHEN 10; 325 MG/1; MG/1
1 TABLET ORAL EVERY 8 HOURS PRN
Qty: 90 TABLET | Refills: 0 | Status: SHIPPED | OUTPATIENT
Start: 2024-12-23 | End: 2025-01-22

## 2024-12-23 RX ORDER — PEN NEEDLE, DIABETIC 32 GX 1/4"
NEEDLE, DISPOSABLE MISCELLANEOUS
Qty: 400 EACH | Refills: 0 | Status: SHIPPED | OUTPATIENT
Start: 2024-12-23

## 2024-12-23 NOTE — TELEPHONE ENCOUNTER
Patient requesting refill on Norco  Last office visit 10-   shows last refill on 11-  Patient does have a pain contract on file with [facility] department  Patient last UDS 9- was consistent with current therapy     CODEINE  Not Detected     Not Detected CM    Comment: INTERPRETIVE INFORMATION: Codeine, U  Positive Cutoff: 40 ng/mL  Methodology: Mass Spectrometry   MORPHINE  Not Detected     Not Detected CM    Comment: INTERPRETIVE INFORMATION:Morphine, U  Positive Cutoff: 20 ng/mL  Methodology: Mass Spectrometry   6-ACETYLMORPHINE  Not Detected     Not Detected CM    Comment: INTERPRETIVE INFORMATION:6-acetylmorphine, U  Positive Cutoff: 20 ng/mL  Methodology: Mass Spectrometry   OXYCODONE  Not Detected     Not Detected CM    Comment: INTERPRETIVE INFORMATION:Oxycodone, U  Positive Cutoff: 40 ng/mL  Methodology: Mass Spectrometry   NOROYXCODONE  Not Detected     Not Detected CM    Comment: INTERPRETIVE INFORMATION:Noroxycodone, U  Positive Cutoff: 100 ng/mL  Methodology: Mass Spectrometry   OXYMORPHONE  Not Detected     Not Detected CM    Comment: INTERPRETIVE INFORMATION:Oxymorphone, U  Positive Cutoff: 40 ng/mL  Methodology: Mass Spectrometry   NOROXYMORPHONE  Not Detected     Not Detected CM    Comment: INTERPRETIVE INFORMATION:Noroxymorphone, U  Positive Cutoff: 100 ng/mL  Methodology: Mass Spectrometry   HYDROCODONE  Present     Present CM    Comment: INTERPRETIVE INFORMATION:Hydrocodone, U  Positive Cutoff: 40 ng/mL  Methodology: Mass Spectrometry   NORHYDROCODONE  Present     Present CM    Comment: INTERPRETIVE INFORMATION:Norhydrocodone, U  Positive Cutoff: 100 ng/mL  Methodology: Mass Spectrometry   HYDROMORPHONE  Present     Present CM    Comment: INTERPRETIVE INFORMATION:Hydromorphone, U  Positive Cutoff: 20 ng/mL  Methodology: Mass Spectrometry   BUPRENORPHINE  Not Detected     Not Detected CM    Comment: INTERPRETIVE INFORMATION:Buprenorphine, U  Positive  Cutoff: 5 ng/mL  Methodology: Mass Spectrometry   NORUBPRENORPHINE  Not Detected     Not Detected CM    Comment: INTERPRETIVE INFORMATION:Norbuprenorphine, U  Positive Cutoff: 20 ng/mL  Methodology: Mass Spectrometry   FENTANYL  Not Detected     Not Detected CM    Comment: INTERPRETIVE INFORMATION:Fentanyl, U  Positive Cutoff: 2 ng/mL  Methodology: Mass Spectrometry   NORFENTANYL  Not Detected     Not Detected CM    Comment: INTERPRETIVE INFORMATION:Norfentanyl, U  Positive Cutoff: 2 ng/mL  Methodology: Mass Spectrometry   MEPERIDINE METABOLITE  Not Detected     Not Detected CM    Comment: INTERPRETIVE INFORMATION:Meperidine metabolite, U  Positive Cutoff: 50 ng/mL  Methodology: Mass Spectrometry   TAPENTADOL  Not Detected     Not Detected CM    Comment: INTERPRETIVE INFORMATION:Tapentadol, U  Positive Cutoff: 100 ng/mL  Methodology: Mass Spectrometry   TAPENTADOL-O-SULF  Not Detected     Not Detected CM    Comment: INTERPRETIVE INFORMATION:Tapentadol-o-Sulf, U  Positive Cutoff: 200 ng/mL  Methodology: Mass Spectrometry   METHADONE  Negative     Negative CM    Comment: Presumptive negative by immunoassay. Testing by mass  spectrometry is available on request.  INTERPRETIVE INFORMATION: Methadone Screen, U  Positive Cutoff: 150 ng/mL  Methodology: Immunoassay   TRAMADOL  Negative     Negative CM    Comment: Presumptive negative by immunoassay. Testing by mass  spectrometry is available on request.  INTERPRETIVE INFORMATION:Tramadol Screen, U  Positive Cutoff: 100 ng/mL  Methodology: Immunoassay   AMPHETAMINE  Not Detected     Not Detected CM    Comment: INTERPRETIVE INFORMATION:Amphetamine, U  Positive Cutoff: 50 ng/mL  Methodology: Mass Spectrometry   METHAMPHETAMINE  Not Detected     Not Detected CM    Comment: INTERPRETIVE INFORMATION:Methamphetamine, U  Positive Cutoff: 200 ng/mL  Methodology: Mass Spectrometry   MDMA- ECSTASY  Not Detected     Not Detected CM    Comment: INTERPRETIVE INFORMATION:MDMA,  U  Positive Cutoff: 200 ng/mL  Methodology: Mass Spectrometry   MDA  Not Detected     Not Detected CM    Comment: INTERPRETIVE INFORMATION:MDA, U  Positive Cutoff: 200 ng/mL  Methodology: Mass Spectrometry   MDEA- Mary  Not Detected     Not Detected CM    Comment: INTERPRETIVE INFORMATION:MDEA, U  Positive Cutoff: 200 ng/mL  Methodology: Mass Spectrometry   METHYLPHENIDATE  Not Detected     Not Detected CM    Comment: INTERPRETIVE INFORMATION:Methylphenidate, U  Positive Cutoff: 100 ng/mL  Methodology: Mass Spectrometry   PHENTERMINE  Not Detected     Not Detected CM    Comment: INTERPRETIVE INFORMATION:Phentermine, U  Positive Cutoff: 100 ng/mL  Methodology: Mass Spectrometry   BENZOYLECGONINE  Negative     Negative CM    Comment: Presumptive negative by immunoassay. Testing by mass  spectrometry is available on request.  INTERPRETIVE INFORMATION:Cocaine Screen, U  Positive Cutoff: 150 ng/mL  Methodology: Immunoassay   ALPRAZOLAM  Not Detected     Not Detected CM    Comment: INTERPRETIVE INFORMATION:Alprazolam, U  Positive Cutoff: 40 ng/mL  Methodology: Mass Spectrometry   ALPHA-OH-ALPRAZOLAM  Not Detected     Not Detected CM    Comment: INTERPRETIVE INFORMATION:Alpha-OH-Alprazolam, U  Positive Cutoff: 20 ng/mL  Methodology: Mass Spectrometry   CLONAZEPAM  Not Detected     Not Detected CM    Comment: INTERPRETIVE INFORMATION:Clonazepam, U  Positive Cutoff: 20 ng/mL  Methodology: Mass Spectrometry   7-AMINOCLONAZEPAM  Not Detected     Not Detected CM    Comment: INTERPRETIVE INFORMATION:7-Aminoclonazepam, U  Positive Cutoff: 40 ng/mL  Methodology: Mass Spectrometry   DIAZEPAM  Not Detected     Not Detected CM    Comment: INTERPRETIVE INFORMATION:Diazepam, U  Positive Cutoff: 50 ng/mL  Methodology: Mass Spectrometry   NORDIAZEPAM  Not Detected     Not Detected CM    Comment: INTERPRETIVE INFORMATION:Nordiazepam, U  Positive Cutoff: 50 ng/mL  Methodology: Mass Spectrometry   OXAZEPAM  Not Detected     Not Detected CM     Comment: INTERPRETIVE INFORMATION:Oxazepam, U  Positive Cutoff: 50 ng/mL  Methodology: Mass Spectrometry   TEMAZEPAM  Not Detected     Not Detected CM    Comment: INTERPRETIVE INFORMATION:Temazepam, U  Positive Cutoff: 50 ng/mL  Methodology: Mass Spectrometry   Lorazepam  Not Detected     Not Detected CM    Comment: INTERPRETIVE INFORMATION:Lorazepam, U  Positive Cutoff: 60 ng/mL  Methodology: Mass Spectrometry   MIDAZOLAM  Not Detected     Not Detected CM    Comment: INTERPRETIVE INFORMATION:Midazolam, U  Positive Cutoff: 20 ng/mL  Methodology: Mass Spectrometry   ZOLPIDEM  Not Detected     Not Detected CM    Comment: INTERPRETIVE INFORMATION:Zolpidem, U  Positive Cutoff: 20 ng/mL  Methodology: Mass Spectrometry   BARBITURATES  Negative     Negative CM    Comment: Presumptive negative by immunoassay. Testing by mass  spectrometry is available on request.  INTERPRETIVE INFORMATION:Barbiturates Screen, U  Positive Cutoff: 200 ng/mL  Methodology: Immunoassay   Creatinine, Urine 20.0 - 400.0 mg/dL <20.0 15.1 R 15.1 R 38.4 R 37.4 R <20.0 CM 24.0 R   Comment: Creatinine <20 mg/dL is consistent with a dilute urine  specimen. Recollection to obtain a more concentrated  specimen, such as a first morning void, may be considered  if unexpected negative urine drug screening results were  obtained.   ETHYL GLUCURONIDE  Negative     Negative CM    Comment: Presumptive negative by immunoassay. Testing by mass  spectrometry is available on request.  INTERPRETIVE INFORMATION:Ethyl Glucuronide Screen, U  Positive Cutoff: 500 ng/mL  Methodology: Immunoassay   MARIJUANA METABOLITE  Negative     Negative CM    Comment: Presumptive negative by immunoassay. Testing by mass  spectrometry is available on request.  INTERPRETIVE INFORMATION: THC (Cannabinoids) Screen, U  Positive Cutoff: 50 ng/mL  Methodology: Immunoassay   PCP  Negative     Negative CM    Comment: Presumptive negative by immunoassay. Testing by mass  spectrometry is  available on request.  INTERPRETIVE INFORMATION:Phencyclidine Screen, U  Positive Cutoff: 25 ng/mL  Methodology: Immunoassay   CARISOPRODOL  Negative     Negative CM    Comment: Presumptive negative by immunoassay. Testing by mass  spectrometry is available on request.  INTERPRETIVE INFORMATION: Carisoprodol Screen, U  Positive Cutoff: 100 ng/mL  Methodology: Immunoassay  The carisoprodol immunoassay has cross-reactivity to  carisoprodol and meprobamate.   Naloxone  Not Detected     Not Detected CM    Comment: INTERPRETIVE INFORMATION:Naloxone, U  Positive Cutoff: 100 ng/mL  Methodology: Mass Spectrometry   Gabapentin  Not Detected     Not Detected CM    Comment: INTERPRETIVE INFORMATION:Gabapentin, U  Positive Cutoff: 3,000 ng/mL  Methodology: Mass Spectrometry   Pregabalin  Present     Present CM    Comment: INTERPRETIVE INFORMATION:Pregabalin, U  Positive Cutoff: 3,000 ng/mL  Methodology: Mass Spectrometry   Alpha-OH-Midazolam  Not Detected     Not Detected CM    Comment: INTERPRETIVE INFORMATION:Alpha-OH-Midazolam, U  Positive Cutoff: 20 ng/mL  Methodology: Mass Spectrometry   Zolpidem Metabolite  Not Detected     Not Detected CM    Comment: INTERPRETIVE INFORMATION:Zolpidem Metabolite, U  Positive Cutoff: 100 ng/mL  Methodology: Mass Spectrometry   PAIN MANAGEMENT DRUG PANEL  See Below     See Below CM    Comment: Methodology: Qualitative Enzyme Immunoassay and Qualitative  Liquid Chromatography-Tandem Mass Spectrometry,  Quantitative Spectrophotometry  The absence of expected drug(s) and/or drug metabolite(s)  may indicate non-compliance, inappropriate timing of  specimen collection relative to drug administration, poor  drug absorption, diluted/adulterated urine, or limitations  of testing. The concentration must be greater than or equal  to the cutoff to be reported as present.  If specific drug  concentrations are required, contact the laboratory within  two weeks of specimen collection to request  quantification  by a second analytical technique. Interpretive questions  should be directed to the laboratory.  Results based on immunoassay detection that do not match  clinical expectations should be  interpreted with caution. Confirmatory testing by mass  spectrometry for immunoassay-based results is available, if  ordered within two weeks of specimen collection. Additional  charges apply.  For medical purposes only; not valid for forensic use.  This test was developed and its performance characteristics  determined by [facility]. It has not been cleared or  approved by the [place] Food and Drug Administration. This test  was performed in a CLIA certified laboratory and is  intended for clinical purposes.

## 2024-12-24 RX ORDER — PREGABALIN 75 MG/1
CAPSULE ORAL
Qty: 90 CAPSULE | Refills: 3 | Status: SHIPPED | OUTPATIENT
Start: 2024-12-24

## 2024-12-27 ENCOUNTER — TELEPHONE (OUTPATIENT)
Dept: PAIN MEDICINE | Facility: CLINIC | Age: 79
End: 2024-12-27
Payer: MEDICARE

## 2024-12-27 ENCOUNTER — OFFICE VISIT (OUTPATIENT)
Dept: PAIN MEDICINE | Facility: CLINIC | Age: 79
End: 2024-12-27
Payer: MEDICARE

## 2024-12-27 VITALS
SYSTOLIC BLOOD PRESSURE: 119 MMHG | OXYGEN SATURATION: 98 % | TEMPERATURE: 99 F | BODY MASS INDEX: 39.22 KG/M2 | HEART RATE: 75 BPM | WEIGHT: 200.81 LBS | RESPIRATION RATE: 18 BRPM | DIASTOLIC BLOOD PRESSURE: 86 MMHG

## 2024-12-27 DIAGNOSIS — E11.40 PAINFUL DIABETIC NEUROPATHY: Primary | ICD-10-CM

## 2024-12-27 PROCEDURE — 99999 PR PBB SHADOW E&M-EST. PATIENT-LVL III: CPT | Mod: PBBFAC,,, | Performed by: NURSE PRACTITIONER

## 2024-12-27 PROCEDURE — 99213 OFFICE O/P EST LOW 20 MIN: CPT | Mod: PBBFAC | Performed by: NURSE PRACTITIONER

## 2024-12-27 NOTE — PROGRESS NOTES
Chronic patient Established Note (Follow up visit)      Interval History 12/27/2024:  The patient returns to clinic today for follow up of back and foot pain. She continues to diabetic nerve pain of her bilateral feet. This is burning in nature. This pain is worse with walking. She does have swelling into the feet. She does have benefit with Qutenza patches. She continues to take Lyrica, Robaxin, and Norco as needed. She denies any adverse effects. She denies any other health changes. Her pain today is 10/10.    Interval History 10/31/24:   She is here for follow up of lower back pain and L knee pain. She states the pain gets better with laying flat. States the it is worse when bending down vs extension. She reports increased Tylenol and Ibuprofen use. She states she uses a walker to get around as well as a rollater walker. She rates the pain as a 9/10. She reports radicular symptoms on the L side.  Was doing physical therapy and home PT but has since finished. Still doing home exercises.     Interval History 9/24/2024:  The patient returns to clinic today for follow up of back and foot pain. She reports worsened neuropathic pain of her bilateral feet. She describes this as burning in nature. Her pain is worse with walking. She also notes swelling into the feet. She does have benefit with Qutenza patches. She denies any adverse effects. She is scheduled for knee injection in November. She is taking Lyrica and Robaxin. She also takes Norco as needed with benefit. She denies any adverse effects. She denies any other health changes. Her pain today is 7/10.    Interval History 9/19/2024:  Patient returns to clinic today for follow up of low back pain. Left worse than right. She continues to take her medication as prescribed: Robaxin 500mg TID PRN, Lyrica 150mg TID, Norco 10 TID PRN. She will return to clinic this coming Tuesday for Qutenza patch for PDN. Patient seen by BRAN Felix on 9/3/24 and was sent for new lumbar MRI,  which she completed on 9/10/24, results below. Her back pain is aching, throbbing, and stabbing. Rated as a 10/10. Pain is worse with standing, bending forward, and leaning back. Improved with resting. Denies red flag symptoms.      Patient also endorses bilateral knee pain. She has received bilateral knee steroid injections and inquires about repeating those (L knee 3/28/24, R knee 4/18/24). She utilizes a cane to ambulate. L knee is worse than R knee. Pain focused to the lateral knees.     Interval History 9/3/2024:  The patient returns to clinic today for follow up of pain. Since last visit, she was admitted for CHF exacerbation. She spent a few weeks in inpatient rehab. She is currently participating in home health PT. She reports worsened low back pain that radiates into the posterolateral aspect of both legs to her feet, left grater than right. Her pain is worse with walking, and getting in/out of a vehicle. She continues to report left knee pain. She does have benefit with Qutenza patches and needs to schedule the next application. She is taking Lyrica and Robaxin. She also takes Norco as needed for severe pain with benefit. She denies any adverse effects. She has been out of medication since Sunday. Her pain today is 10/10.    Interval History 5/10/2024:  The patient returns to clinic today for follow up of pain. She is s/p left knee steroid injection on 3/28/2024. She reports limited relief. She is s/p right knee steroid injection on 4/18/2024. She reports significant relief of her right knee pain. She continues to report neuropathic pain into her bilateral feet. She describes this pain as burning and tingling in nature. She does have benefit with Qutenza patches. She is taking Lyrica. She also takes Norco as needed with benefit. She denies any adverse effects. She denies any other health changes. Her pain today is 9/10.    Interval History 3/14/2024:  Indira Waters returns today for f/u of her  painful neuropathy in the feet. She was last seen in January for this complaint. Qutenza patches were administered at that time. We also continued medication management with Robaxin, Lyrica, and Norco. She reports incredible relief of neuropathic symptoms with qutenza injections. Significantly improves walking ability. Primary complaint today is left knee pain. Started several years ago. Has been mostly intermittent over the years, but has been more constant and severe over the past 2 months. Localized over medial joint line. No falls. Pain today is an 8/10. Continues Norco 10/325 mg TID, consistently. No adverse effects. Feels like medications continue to help with functional mobility and ADL ability..     Interval History 1/26/2024:  The patient returns to clinic today for foot pain. She continues to report neuropathic pain into her feet. She describes this as burning in nature. She does find benefit with Qutenza patches. She denies any rash or increased pain after the patch application. She continues to take Lyrica, Robaxin, and Norco with benefit. She denies any adverse effects. She denies any other health changes. Her pain today is 8/10.    Interval History 12/14/2023:  The patient returns to clinic today for follow up of back and leg pain. She continues to report low back pain that radiates into both legs. She continues to report bilateral neuropathic pain into the feet. She states that today is a bad day. She is having more left foot and toe pain. Her pain is worse with prolonged standing and walking. She does have benefit with Qutenza patches. She is taking Lyrica, Robaxin, and Norco with benefit and without adverse effects. She denies other health changes. Her pain today is 8/10.    Interval History 10/11/2023:  The patient returns to clinic today for follow up of bilateral foot pain. She continues to report neuropathic pain to her feet. Her pain is worse with standing and walking. She does have benefit  with Qutenza patches. She also takes Lyrica, Robaxin, and Norco. She denies any adverse effects. She denies any other health changes. Her pain today is 8/10.    Interval History 9/15/2023:  The patient returns to clinic today for follow up of back and leg pain. She did have benefit with Qutenza patches. She continues to report low back pain with intermittent radicular leg pain. She continues to report neuropathic pain into her feet. She continues to take Lyrica and Robaxin with benefit. She also takes Norco as needed with benefit. She denies any adverse effects. She denies any other health changes. Her pain today is 9/10.    Interval History 6/27/2023:  The patient returns to clinic today for follow up of pain. She continues to report nerve pain to her feet bilaterally. This pain is worse with standing and walking. She is taking Lyrica and Robaxin. She also takes Norco as needed with benefit and without adverse effects. She denies any other health changes. Her pain today is 9/10.    Interval History 6/16/2023:  The patient returns to clinic today for follow up of back and leg pain. She continues to report low back pain. She also reports diabetic neuropathy to her bilateral feet. Her pain is worse with standing and walking. She has tried Qutenza patches with benefit in the past. She continues to take Robaxin and Lyrica. She also takes Norco as needed with benefit and without adverse effects. She denies any other health changes. Her pain today is 10/10.    Interval History 3/17/2023:  Mrs Waters presents for follow up of chronic, continuous neuropathic pain to [facility]. She is s/p Nevro SCS trial and unfortunately she did not get the relief she was hoping for and 50% at best relief but this was not consistent. She has no constitutional s/s concerning for infection and denies newer neurological changes since trial. She continues with medication mgt and states Qutenza application has been providing significant benefit.      Interval History 2/10/2023:  Mrs Waters presents for follow up of chronic lower back painn and radicular pain in conjunction with PDN to bilateral feet. She is doing fair with medication mgt of Norco, Robaxin, and Lyrica and does need refill at this time. She denies SE of medications. She is also here for Qutenza application today. She is scheduled to have upcoming SCS trial with Nevro to aslo address her PDN.     Interval History 12/13/2022:  Mrs Waters presents for follow up of chronic pain. She is ready to repeat Qutenza application as it has been >91 days and she had significant improvement of symptoms of PDN. She recently had repeat A1C and just above 10.0 but is decreasing. She continues to take medication with benefit and denies SE of medication. Medication regimen include Norco 10/325mg TID, Robaxin 500mg and Lyrica 150mg.     Interval History 10/12/2022:  Mrs Waters presents for follow up of chronic neuropathy. She is s/p qutenza patch placement with improved functioning and neuropathy control. Unfortunately her A1C was above 11 which inhibits her from Nevro SCS trial at this time. She is eager to have SCS trial. She denies new areas of pain or neurological changes. She continued to take  Norco 10/325mg TID, Robaxin 500mg and Lyrica 150mg TID with benefit and denies significant SE of medications.     Interval History 9/8/2022:  Mrs Waters presents for follow up of chronic lower back painn and radicular pain in conjunction with PDN to bilateral feet. She is doing fair with medication mgt of Norco, Robaxin, and Lyrica and does need refill at this time. She denies SE of medications. She is patiently awaiting her A1C to become lower than 10 to proceed with SCS trial.     Interval History 8/12/2022:  Mrs Waters presents for delayed FU. She has had continuous pain to lumbar and radicular pain but also peripheral neuropathy complaints. Over interval she has had CVA but doing fair. Her A1C has unfortunately elevated  above 10 at this time and SCS trial placed on hold but phychiatric evaluation complete. She continues to take Norco 10/325mg TID, Robaxin 500mg TID and Lyrica 150mg TID with some benefit and denies SE of medications. No s/s concerning for cauda equina.     Interval History 4/12/2022:  Patient returns to clinic today for follow-up. Her neuropathic pain continues to be an issue for her, but she says that she is able to manage. She is taking Norco 10-325mg TID, Robaxin 500mg TID, and Lyrica 150mg TID without any adverse side effects. She denies any changes in her symptoms. She would like to proceed with SCS trial. Discussed last time that her HbA1c should be <10, was 9.8 on most recent labs. Seen by psychology and cleared for SCS.     Interval History 2/14/2022:  Mrs Waters presents for follow up. Pt states overall neuropathy continues. Since last visit she has been stable with medication mgt and takign Norco 10/325mg TID, Robaxin 500mg TID and Lyrica 150mg TID. She denies new areas of pain or neurological changes. Medication adequate to control pain without adverse SE.      Interval History 12/21/2021:  Pt presents for urgent evaluation s/p fall in kitchen last night. Pt unsure of LOC or head trauma but states some amnesia of events. Pt is having left knee pain, B ankle pain L>R and lower back/buttock pain. Daughter further states tremor like activity last night. During visit daughter asked for bedside commode due to inability to ambulate.  Pt during visit appeared somnolent, pale and began vomiting. Discussed going to ER for further evaluation.      Interval History 12/14/2021:  Mrs Waters presents for follow up of chronic pain complaints. She is hopeful she will have A1C lower soon to move forward with SCS. She is doing fair with medication mgt alone at this time and denies SE of medications. Pt is currently taking Norco 10/325mg TID PRN, Lyrica 150mg TID, and Robaxin 500mg TID. She denies new areas of pain or  neurological changes.      Interval History 10/14/2021:  The Pt presents for follow up and s/p B Lumbar sympathetic blocks. Pt states this has done well but ready to proceed with SCS trial. She is aware A1C must be under tight control and Pt and daughter re-iterate knowing this. Pt also mentions DKA admission and diagnosis of vasculitis as well over interval. Pt continues to take hydrocodone 10/325 mg TID PRN, Lyrica 150 mg TID, and Robaxin 500mg TID. She denies any SE of medication, denies new neurological changes.      Interval Hx 09/16/2021  Indira Waters presents to the clinic for a follow-up appointment for f/u after bilateral lumbar sympathetic block on 8/25/21.Patient reports >50% relief after lumbar sympathetic block that lasted 2 weeks when she then developed a UTI/DKA, was admitted to the hospital and pain recurred.  Current pain intensity is 8/10.     Interval History 8/12/2021:  Indira Waters presents to the clinic for a follow-up appointment for BLE diabetic neuropathy, painful. Continues with Norco 10/325mg, Lyrica 150mg TID, Robaxin 750mg daily PRN. She had to cancel previously scheduled lumbar sympathetic block 2/2 lithotripsy for painful kidney stones, which have now passed. She still has intermittent pain with urination but overall that pain is improving. She had to cancel previous angiogram for this same reason - this has not been rescheduled yet. She denies any change in her LE pain. Denies any new neurological sxs in BLEs.     Interval History 6/10/21:  Indira Waters presents to the clinic for a 2 week follow-up appointment from lumbar sympathetic nerve block in early May. She reports minimal relief from this intervention, but did note that it helped some, briefly. Since the last visit, Indira Waters states the pain has been persistant. Current pain intensity is 10/10. Patient has chronic generalized diffuse pain, most pronounced in her BL lower extremities 2/2 diabetic neuropathy.  "HSe states that the pain is a little better than at her last visit. Most days are 10/10, but some days are better than others. Her pain is aggravated by exertion, walking, or sitting/standing for extended periods of time. He pain is mildly improved by rest and medications. She is currently taking Lyrica 150 PO TID, Zoloft, and Norco 10-325mg TID PRN. She states that the pain meds are helping but she is still constantly in pain and unable to participate in her ADLs. She has now established care with PCP for assistance w/ poorly controlled T2DM, last A1c 11.4. She has not yet established care w/ Rheumatology for fibromyalgia management.    Interval HPI 4/15/21:  Patient returns for follow up of chronic generalized diffuse pain. At the last visit, had EMG (not yet completed), lumbar and hip x-ray imaging ordered. She was also referred to Rheumatology for fibromyalgia management and referral to PCP for DM2 management and weaning of zoloft for transition to cymbalta for treatment of neuropathy. She had her Lyrica increased to 150mg PO TID, and prescribed Norco 10mg TID with opioid contract signed. She has been taking Norco 10mg TID and it has been helping her "bad" pains but does not take all of her pain away. She has not yet been set up with her new PCP or rheumatologist yet for fibromyalgia. Still continues to have generalized pain everywhere including feet, hips, legs, lower and upper back, neck and shoulder pain. Continues to have neuropathy in the bilateral lower extremities due to uncontrolled DM2.    Initial Visit 3/11/21:  Indira Waters presents to the clinic for the evaluation of chronic pain. Complaining of pain everywhere including feet, legs, hips, lower and upper back, neck, shoulder. Pain started 5+ years ago. Pain 10/10 at worse. She was referred here by her PCP Dr. Ramos who she has been seeing for over 6 years. She states her pain is aggravated by any physical activity/movement. She takes lyrica, " robaxin, and Norco 10 TID with mild relief of pain. Per patient, she was referred here by her PCP for medication refill. She has not tried physical therapy for several years, she states it did not help last time she was in PT. She says she is trying to exercise daily by doing yoga. She walks with a walker. She has numerous comorbidities including DM2 (Last A1C 9+ per prior family medicine note in Jan 2021), fibromyalgia, lupus, DDD. She states she would like to find a new PCP as she no longer lives near her old one. Patient denies night fever/night sweats, urinary incontinence, bowel incontinence and significant weight loss.      Pain Disability Index Review:      10/31/2024    10:55 AM 9/24/2024     1:13 PM 9/19/2024    10:18 AM   Last 3 PDI Scores   Pain Disability Index (PDI) 56 49 56     Pain Medications:  Norco 10-325mg TID, Robaxin 500mg TID, and Lyrica 150mg TID    Opioid Contract: yes     report:  Reviewed and consistent with medication use as prescribed.    Pain Procedures:    - reports possible back injection 10+ years ago  - Lumbar sympathetic nerve block 05/05/21 - Dr. Pereira - minimal relief    Physical Therapy/Home Exercise: no     Imaging:   Hip X-ray 4/7/21  FINDINGS:  Osseous structures appear intact without evidence of fracture or osseous destructive process.  No apparent dislocation.     Modest degenerative change or significant joint space narrowing.     Lumbar X-ray 4/7/21  FINDINGS:  Diffuse bony demineralization.  Vertebral bodies are normal in height without evidence of fracture.  No pars defects.     Normal sagittal alignment is preserved.  No spondylolisthesis. No abnormal translation with flexion and extension.     Intervertebral disc heights are well maintained.  Mild facet arthropathy in the lower lumbar spine.     Mild scattered vascular calcification.     Impression:     No evidence of fracture or malalignment.     Mild facet arthropathy in the lower lumbar spine.     Diffuse bony  "demineralization.  Consider correlation with DEXA.    Allergies:   Review of patient's allergies indicates:   Allergen Reactions    Bleach (sodium hypochlorite) Shortness Of Breath    Nitrofurantoin macrocrystalline Anaphylaxis    Lipitor [atorvastatin] Diarrhea and Rash    Nsaids (non-steroidal anti-inflammatory drug)      Tolerates aspirin      Pcn [penicillins]     Statins-hmg-coa reductase inhibitors     Toradol [ketorolac]        Current Medications:   Current Outpatient Medications   Medication Sig Dispense Refill    acetaminophen (TYLENOL) 500 MG tablet Take 2 tablets (1,000 mg total) by mouth every 8 (eight) hours as needed for Pain.  0    albuterol (ACCUNEB) 1.25 mg/3 mL Nebu Take 3 mLs (1.25 mg total) by nebulization every 6 (six) hours as needed (for wheezing or shortness of breath). Rescue 75 mL 9    allopurinoL (ZYLOPRIM) 300 MG tablet Take 1 tablet (300 mg total) by mouth once daily. 90 tablet 3    aspirin (ECOTRIN) 81 MG EC tablet Take 1 tablet (81 mg total) by mouth once daily. 30 tablet 0    BD ULTRA-FINE MICRO PEN NEEDLE 32 gauge x 1/4" Ndle Use with insulin 5 times a day. 400 each 0    blood sugar diagnostic Strp To check BG 6 times daily, to use with insurance preferred meter 200 each 11    blood-glucose meter kit To check BG 6 times daily, to use with insurance preferred meter 1 each 0    evolocumab (REPATHA SURECLICK) 140 mg/mL PnIj Inject 1 mL (140 mg total) into the skin every 14 (fourteen) days. 2 each 11    fenofibrate micronized (LOFIBRA) 134 MG Cap Take 1 capsule (134 mg total) by mouth daily with breakfast. 90 capsule 3    HYDROcodone-acetaminophen (NORCO)  mg per tablet Take 1 tablet by mouth every 8 (eight) hours as needed for Pain. 90 tablet 0    insulin glargine U-100, Lantus, (LANTUS SOLOSTAR U-100 INSULIN) 100 unit/mL (3 mL) InPn pen Inject 20 Units into the skin 2 (two) times a day. 40 mL 3    insulin lispro (HUMALOG KWIKPEN INSULIN) 100 unit/mL pen Inject 13 Units into the " skin before breakfast AND 13 Units daily with lunch AND 11 Units daily with dinner or evening meal. Plus correction scale. Max TDD 50units.. 40 mL 3    ipratropium-albuteroL (COMBIVENT RESPIMAT)  mcg/actuation inhaler Inhale 1 puff into the lungs every 6 (six) hours. 12 g 3    lancets (TRUEPLUS LANCETS) 30 gauge Misc Check blood sugar 4 times daily 400 each 2    losartan (COZAAR) 50 MG tablet Take 1 tablet (50 mg total) by mouth once daily. 90 tablet 3    melatonin (MELATIN) 3 mg tablet Take 2 tablets (6 mg total) by mouth nightly as needed for Insomnia.      methocarbamoL (ROBAXIN) 500 MG Tab Take 500 mg by mouth 4 (four) times daily.      methocarbamoL (ROBAXIN) 500 MG Tab TAKE ONE TABLET BY MOUTH 3 TIMES DAILY 90 tablet 2    metoclopramide HCl (REGLAN) 5 MG tablet TAKE ONE TABLET BY MOUTH FOUR TIMES DAILY AS NEEDED FOR nausea prevention 90 tablet 3    metoprolol succinate (TOPROL-XL) 100 MG 24 hr tablet Take 1 tablet (100 mg total) by mouth once daily. 90 tablet 3    miconazole nitrate 2% (MICOTIN) 2 % Oint Apply topically 2 (two) times daily.      NIFEdipine (PROCARDIA-XL) 30 MG (OSM) 24 hr tablet Take 1 tablet (30 mg total) by mouth 2 (two) times a day. 180 tablet 3    nitroGLYCERIN (NITROSTAT) 0.4 MG SL tablet DISSOLVE 1 TABLET UNDER THE TONGUE EVERY 5 MINUTES AS NEEDED FOR CHEST PAIN. DO NOT EXCEED A TOTAL OF 3 DOSES IN 15 MINUTES. 25 tablet 11    pregabalin (LYRICA) 75 MG capsule TAKE ONE CAPSULE BY MOUTH 3 TIMES DAILY 90 capsule 3    senna-docusate 8.6-50 mg (PERICOLACE) 8.6-50 mg per tablet Take 1 tablet by mouth 2 (two) times daily as needed for Constipation. 60 tablet 0    sertraline (ZOLOFT) 100 MG tablet TAKE ONE TABLET BY MOUTH EVERY DAY 90 tablet 3    torsemide (DEMADEX) 20 MG Tab Take 1 tablet (20 mg total) by mouth once daily. 90 tablet 3     No current facility-administered medications for this visit.       REVIEW OF SYSTEMS:    GENERAL:  No weight loss, malaise or fevers.  HEENT:  Negative  for frequent or significant headaches.  NECK:  Negative for lumps, goiter, pain and significant neck swelling.  RESPIRATORY:  Negative for cough, wheezing or shortness of breath.  CARDIOVASCULAR:  Negative for chest pain, leg swelling or palpitations. HTN  GI:  Negative for abdominal discomfort, blood in stools or black stools or change in bowel habits.  MUSCULOSKELETAL:  See HPI.  SKIN:  Negative for lesions, rash, and itching.  ENDO: Diabetes  PSYCH:  Negative for sleep disturbance, mood disorder and recent psychosocial stressors.  HEMATOLOGY/LYMPHOLOGY:  Negative for prolonged bleeding, bruising easily or swollen nodes.  NEURO:   No history of headaches, syncope, paralysis, seizures or tremors.  All other reviewed and negative other than HPI.    Past Medical History:  Past Medical History:   Diagnosis Date    Arthritis     Back pain     Cancer     ovarian    Cervical cancer     Coronary artery disease     Depression     Diabetes mellitus     Fibromyalgia     Heart attack     History of MI (myocardial infarction)     Hyperlipidemia     Hypertension     Lupus     Stroke     slight left sided weakness       Past Surgical History:  Past Surgical History:   Procedure Laterality Date    APPENDECTOMY       SECTION      2    CORONARY ANGIOGRAPHY N/A 2022    Procedure: ANGIOGRAM, CORONARY ARTERY;  Surgeon: Jose L Méndez MD;  Location: [facility] CATH LAB;  Service: Cardiology;  Laterality: N/A;    HYSTERECTOMY      with USO for cervical cancer    INJECTION OF ANESTHETIC AGENT AROUND NERVE Bilateral 2021    Procedure: BLOCK, NERVE, SYMPATHIC;  Surgeon: Holden Pereira MD;  Location: [facility];  Service: Pain Management;  Laterality: Bilateral;    INJECTION OF ANESTHETIC AGENT AROUND NERVE N/A 2021    Procedure: BLOCK, NERVE, SYMPATHETIC  need consent;  Surgeon: Holden Pereira MD;  [facility];  Service: Pain Management;  Laterality: N/A;    WV EVAL,SWALLOW FUNCTION,CINE/VIDEO  RECORD  2021         TONSILLECTOMY      TRIAL OF SPINAL CORD NERVE STIMULATOR N/A 3/8/2023    Procedure: LUMBAR SPINAL CORD STIMULATOR TRIAL NEVRO REP PATIENT STATES SHE NO LONGER TAKES PLAVIX;  Surgeon: Holden Pereira MD;  Location: [facility];  Service: Pain Management;  Laterality: N/A;       Family History:  Family History   Problem Relation Name Age of Onset    COPD Mother      Lupus Mother      Hernia Mother      Uterine cancer Mother          vs cervical cancer    Ovarian cancer Mother      Diabetes Father      Coronary artery disease Father      Colon cancer Maternal Grandmother          in her 50's       Social History:  Social History     Socioeconomic History    Marital status:    Tobacco Use    Smoking status: Former     Current packs/day: 0.00     Types: Cigarettes     Quit date: 2020     Years since quittin.1     Passive exposure: Past    Smokeless tobacco: Never   Substance and Sexual Activity    Alcohol use: Not Currently     Comment: occasionally    Drug use: Yes     Types: Hydrocodone     Comment: three times a day    Sexual activity: Not Currently   Social History Narrative    Lives with daughter. .      Social Drivers of Health     Financial Resource Strain: Low Risk  (2024)    Overall Financial Resource Strain (CARDIA)     Difficulty of Paying Living Expenses: Not hard at all   Food Insecurity: No Food Insecurity (2024)    Hunger Vital Sign     Worried About Running Out of Food in the Last Year: Never true     Ran Out of Food in the Last Year: Never true   Transportation Needs: No Transportation Needs (2024)    TRANSPORTATION NEEDS     Transportation : No   Physical Activity: Inactive (2024)    Exercise Vital Sign     Days of Exercise per Week: 0 days     Minutes of Exercise per Session: 0 min   Stress: No Stress Concern Present (2024)    [employer] - Occupational Stress Questionnaire     Feeling of Stress :  Not at all   Housing Stability: Low Risk  (7/20/2024)    Housing Stability Vital Sign     Unable to Pay for Housing in the Last Year: No     Homeless in the Last Year: No       OBJECTIVE:    /86   Pulse 75   Temp 98.8 °F (37.1 °C)   Resp 18   Wt 91.1 kg (200 lb 13.4 oz)   SpO2 98%   BMI 39.22 kg/m²     PHYSICAL EXAMINATION:     General appearance: Well appearing, in no acute distress, alert and oriented x3.  Psych:  Mood and affect appropriate.  Skin: Skin color, texture, turgor normal, no rashes or lesions, in both upper and lower body.  Head/face:  Atraumatic, normocephalic.  Pulm: Symmetric chest rise, no respiratory distress noted.   Back: Straight leg raise in the sitting position is positive for radicular pain. There is pain with palpation over lumbar facet joints. Limited ROM with pain on flexion and extension.   Musculoskeletal: There is pain with palpation over bilateral SI joints. There is pain with palpation over medial and lateral joint line of left knee. 5/5 strength in right ankle with plantar and dorsiflexion. 5/5 strength in left ankle with plantar and dorsiflexion. 4/5 strength with right knee flexion and extension. 4/5 strength with left knee flexion and extension.   Neuro:  Decreased sensation to light touch to BLE.   Gait: Antalgic- ambulates with wheelchair.     ASSESSMENT: 79 y.o. year old female with chronic pain, consistent with:     1. Painful diabetic neuropathy  capsaicin-skin cleanser patch 4 patch              PLAN:     - Previous imaging reviewed. Labs reviewed.     - Qutenza patch as per below.     - Continue Robaxin 500 mg TID PRN.     - Continue Lyrica 150 mg TID.     - Continue Norco 10/325 mg TID PRN, #90, refill already sent.     - The patient is here today for a refill of current pain medications and they believe these provide effective pain control and improvements in quality of life.  The patient notes no serious side effects, and feels the benefits outweigh the  risks.  The patient was reminded of the pain contract that they signed previously as well as the risks and benefits of the medication including possible death.  The updated [place] [employer] prescription monitoring program was reviewed, and the patient has been found to be compliant with current treatment plan.     - UDS from 9/19/2024 reviewed and consistent.      - RTC in 3 months.       The above plan and management options were discussed at length with patient. Patient is in agreement with the above and verbalized understanding.    Elvira Hylton NP  12/27/2024    PATIENT NAME: Indira Waters        MRN: [id number]    Diagnosis: Painful Diabetic Neuropathy E13.42     Postprocedural Diagnosis: Same     Complications: None     Informed Consent:  The patient's condition and proposed procedures, risks (including complications of nerve damage, skin irritation, infection, and failure of pain relief), and alternatives were discussed with the patient or responsible party.  The patient's/responsible party's questions were answered.  The patient/responsible party appeared to understand and chose to proceed.       Procedural Pause:  A procedural pause verifying correct patient, medical record number, allergies, medications to be administered, current vital signs, and proper application site was performed immediately prior to beginning the procedure.     Procedure in Detail:  The patient was placed in a sitting position. 4 patches were used for the procedure today.  The patches were applied to the target area and secured with tape.  A coban was used to further secure the patches in place.  The patient was allowed to rest in a comfortable position, and monitored by staff every 10-15 minutes to ensure comfort. The patches remained in place for 30 minutes.  The patches were then removed and the cleaning gel was applied and allowed to sit for an additional 60 seconds, after which the area was wiped clean.      The  patient was then discharged in stable condition.        DISCUSSION:  A Qutenza patch was applied today with the purpose of treating the patients nerve pain. They were informed that they may have some burning of the skin for a few days, and should not take a hot shower for 2-3 days as that may irritate the area.  They were informed that the area may feel like they had a sunburn. They were informed that it can take about 2-4 weeks for the effects of the patch to be fully realized     Plan to repeat every 91 days.

## 2025-01-04 ENCOUNTER — PATIENT MESSAGE (OUTPATIENT)
Dept: ADMINISTRATIVE | Facility: OTHER | Age: 80
End: 2025-01-04
Payer: MEDICARE

## 2025-01-05 DIAGNOSIS — E08.42 DIABETIC POLYNEUROPATHY ASSOCIATED WITH DIABETES MELLITUS DUE TO UNDERLYING CONDITION: ICD-10-CM

## 2025-01-05 DIAGNOSIS — G89.4 CHRONIC PAIN DISORDER: ICD-10-CM

## 2025-01-05 DIAGNOSIS — G89.4 CHRONIC PAIN SYNDROME: ICD-10-CM

## 2025-01-06 RX ORDER — METHOCARBAMOL 500 MG/1
TABLET, FILM COATED ORAL
Qty: 90 TABLET | Refills: 2 | Status: SHIPPED | OUTPATIENT
Start: 2025-01-06

## 2025-01-23 ENCOUNTER — OFFICE VISIT (OUTPATIENT)
Dept: PAIN MEDICINE | Facility: CLINIC | Age: 80
End: 2025-01-23
Attending: ANESTHESIOLOGY
Payer: MEDICARE

## 2025-01-23 ENCOUNTER — PATIENT MESSAGE (OUTPATIENT)
Dept: ENDOCRINOLOGY | Facility: CLINIC | Age: 80
End: 2025-01-23
Payer: MEDICARE

## 2025-01-23 DIAGNOSIS — G89.4 CHRONIC PAIN SYNDROME: ICD-10-CM

## 2025-01-23 DIAGNOSIS — N18.31 STAGE 3A CHRONIC KIDNEY DISEASE: ICD-10-CM

## 2025-01-23 DIAGNOSIS — M62.85 DYSFUNCTION OF THE MULTIFIDUS MUSCLE OF LUMBAR REGION: Primary | ICD-10-CM

## 2025-01-23 DIAGNOSIS — M17.0 PRIMARY OSTEOARTHRITIS OF BOTH KNEES: ICD-10-CM

## 2025-01-23 DIAGNOSIS — E11.40 PAINFUL DIABETIC NEUROPATHY: ICD-10-CM

## 2025-01-23 DIAGNOSIS — Z79.4 TYPE 2 DIABETES MELLITUS WITH HYPERGLYCEMIA, WITH LONG-TERM CURRENT USE OF INSULIN: ICD-10-CM

## 2025-01-23 DIAGNOSIS — E11.65 TYPE 2 DIABETES MELLITUS WITH HYPERGLYCEMIA, WITH LONG-TERM CURRENT USE OF INSULIN: ICD-10-CM

## 2025-01-23 PROCEDURE — 98006 SYNCH AUDIO-VIDEO EST MOD 30: CPT | Mod: 95,GC,, | Performed by: ANESTHESIOLOGY

## 2025-01-23 RX ORDER — PREGABALIN 150 MG/1
150 CAPSULE ORAL 2 TIMES DAILY
Qty: 60 CAPSULE | Refills: 5 | Status: SHIPPED | OUTPATIENT
Start: 2025-01-23 | End: 2025-02-12 | Stop reason: SDUPTHER

## 2025-01-23 RX ORDER — HYDROCODONE BITARTRATE AND ACETAMINOPHEN 10; 325 MG/1; MG/1
1 TABLET ORAL EVERY 8 HOURS PRN
Qty: 90 TABLET | Refills: 0 | Status: CANCELLED | OUTPATIENT
Start: 2025-01-24 | End: 2025-02-23

## 2025-01-23 NOTE — PROGRESS NOTES
Chronic Pain Established Patient - Virtual Visit      The patient location is: home  The chief complaint leading to consultation is: back pain  Visit type: Virtual visit with synchronous audio and video  Total time spent with patient: 30 minutes   Each patient to whom he or she provides medical services by telemedicine is:  (1) informed of the relationship between the physician and patient and the respective role of any other health care provider with respect to management of the patient; and (2) notified that he or she may decline to receive medical services by telemedicine and may withdraw from such care at any time.    PCP: Chun Zapata MD      CHIEF COMPLAINT: back and foot pain        INTERVAL HISTORY (1/23/2025):   Patient returns to clinic today for follow up of back pain. Her pain is focused to the lower back that is mainly axial in nature, but also radiates down the bilateral legs to the top and bottom of the foot. She states the pain gets better with laying flat. States the it is worse when bending down vs extension.  She continues taking Robaxin and Lyrica and Norco TID, these provide moderate relief. She denies red flag symptoms. She rates the pain as 8/10. . At her previous visit on 10/31/24, she had a positive multifidus lift test and positive prone instability test with MRI finding of multifidus  muscle atrophy and no facet arthropathy   In the past She underwent Nevro trial SCS for PDN which was not successful to relief her foot pain.    Interval History 12/27/2024:  The patient returns to clinic today for follow up of back and foot pain. She continues to diabetic nerve pain of her bilateral feet. This is burning in nature. This pain is worse with walking. She does have swelling into the feet. She does have benefit with Qutenza patches. She continues to take Lyrica, Robaxin, and Norco as needed. She  denies any adverse effects. She denies any other health changes. Her pain today is 10/10.     Interval History 10/31/24:   She is here for follow up of lower back pain and L knee pain. She states the pain gets better with laying flat. States the it is worse when bending down vs extension. She reports increased Tylenol and Ibuprofen use. She states she uses a walker to get around as well as a rollater walker. She rates the pain as a 9/10. She reports radicular symptoms on the L side.  Was doing physical therapy and home PT but has since finished. Still doing home exercises.      Interval History 9/24/2024:  The patient returns to clinic today for follow up of back and foot pain. She reports worsened neuropathic pain of her bilateral feet. She describes this as burning in nature. Her pain is worse with walking. She also notes swelling into the feet. She does have benefit with Qutenza patches. She denies any adverse effects. She is scheduled for knee injection in November. She is taking Lyrica and Robaxin. She also takes Norco as needed with benefit. She denies any adverse effects. She denies any other health changes. Her pain today is 7/10.     Interval History 9/19/2024:  Patient returns to clinic today for follow up of low back pain. Left worse than right. She continues to take her medication as prescribed: Robaxin 500mg TID PRN, Lyrica 150mg TID, Norco 10 TID PRN. She will return to clinic this coming Tuesday for Qutenza patch for PDN. Patient seen by BRAN Felix on 9/3/24 and was sent for new lumbar MRI, which she completed on 9/10/24, results below. Her back pain is aching, throbbing, and stabbing. Rated as a 10/10. Pain is worse with standing, bending forward, and leaning back. Improved with resting. Denies red flag symptoms.      Patient also endorses bilateral knee pain. She has received bilateral knee steroid injections and inquires about repeating those (L knee 3/28/24, R knee 4/18/24). She utilizes a cane to  ambulate. L knee is worse than R knee. Pain focused to the lateral knees.      Interval History 9/3/2024:  The patient returns to clinic today for follow up of pain. Since last visit, she was admitted for CHF exacerbation. She spent a few weeks in inpatient rehab. She is currently participating in home health PT. She reports worsened low back pain that radiates into the posterolateral aspect of both legs to her feet, left grater than right. Her pain is worse with walking, and getting in/out of a vehicle. She continues to report left knee pain. She does have benefit with Qutenza patches and needs to schedule the next application. She is taking Lyrica and Robaxin. She also takes Norco as needed for severe pain with benefit. She denies any adverse effects. She has been out of medication since Sunday. Her pain today is 10/10.     Interval History 5/10/2024:  The patient returns to clinic today for follow up of pain. She is s/p left knee steroid injection on 3/28/2024. She reports limited relief. She is s/p right knee steroid injection on 4/18/2024. She reports significant relief of her right knee pain. She continues to report neuropathic pain into her bilateral feet. She describes this pain as burning and tingling in nature. She does have benefit with Qutenza patches. She is taking Lyrica. She also takes Norco as needed with benefit. She denies any adverse effects. She denies any other health changes. Her pain today is 9/10.     Interval History 3/14/2024:  Indira Waters returns today for f/u of her painful neuropathy in the feet. She was last seen in January for this complaint. Qutenza patches were administered at that time. We also continued medication management with Robaxin, Lyrica, and Norco. She reports incredible relief of neuropathic symptoms with qutenza injections. Significantly improves walking ability. Primary complaint today is left knee pain. Started several years ago. Has been mostly intermittent  over the years, but has been more constant and severe over the past 2 months. Localized over medial joint line. No falls. Pain today is an 8/10. Continues Norco 10/325 mg TID, consistently. No adverse effects. Feels like medications continue to help with functional mobility and ADL ability..      Interval History 1/26/2024:  The patient returns to clinic today for foot pain. She continues to report neuropathic pain into her feet. She describes this as burning in nature. She does find benefit with Qutenza patches. She denies any rash or increased pain after the patch application. She continues to take Lyrica, Robaxin, and Norco with benefit. She denies any adverse effects. She denies any other health changes. Her pain today is 8/10.     Interval History 12/14/2023:  The patient returns to clinic today for follow up of back and leg pain. She continues to report low back pain that radiates into both legs. She continues to report bilateral neuropathic pain into the feet. She states that today is a bad day. She is having more left foot and toe pain. Her pain is worse with prolonged standing and walking. She does have benefit with Qutenza patches. She is taking Lyrica, Robaxin, and Norco with benefit and without adverse effects. She denies other health changes. Her pain today is 8/10.     Interval History 10/11/2023:  The patient returns to clinic today for follow up of bilateral foot pain. She continues to report neuropathic pain to her feet. Her pain is worse with standing and walking. She does have benefit with Qutenza patches. She also takes Lyrica, Robaxin, and Norco. She denies any adverse effects. She denies any other health changes. Her pain today is 8/10.     Interval History 9/15/2023:  The patient returns to clinic today for follow up of back and leg pain. She did have benefit with Qutenza patches. She continues to report low back pain with intermittent radicular leg pain. She continues to report neuropathic  pain into her feet. She continues to take Lyrica and Robaxin with benefit. She also takes Norco as needed with benefit. She denies any adverse effects. She denies any other health changes. Her pain today is 9/10.     Interval History 6/27/2023:  The patient returns to clinic today for follow up of pain. She continues to report nerve pain to her feet bilaterally. This pain is worse with standing and walking. She is taking Lyrica and Robaxin. She also takes Norco as needed with benefit and without adverse effects. She denies any other health changes. Her pain today is 9/10.     Interval History 6/16/2023:  The patient returns to clinic today for follow up of back and leg pain. She continues to report low back pain. She also reports diabetic neuropathy to her bilateral feet. Her pain is worse with standing and walking. She has tried Qutenza patches with benefit in the past. She continues to take Robaxin and Lyrica. She also takes Norco as needed with benefit and without adverse effects. She denies any other health changes. Her pain today is 10/10.     Interval History 3/17/2023:  Mrs Waters presents for follow up of chronic, continuous neuropathic pain to [facility]. She is s/p Nevro SCS trial and unfortunately she did not get the relief she was hoping for and 50% at best relief but this was not consistent. She has no constitutional s/s concerning for infection and denies newer neurological changes since trial. She continues with medication mgt and states Qutenza application has been providing significant benefit.      Interval History 2/10/2023:  Mrs Waters presents for follow up of chronic lower back painn and radicular pain in conjunction with PDN to bilateral feet. She is doing fair with medication mgt of Norco, Robaxin, and Lyrica and does need refill at this time. She denies SE of medications. She is also here for Qutenza application today. She is scheduled to have upcoming SCS trial with [facility] address her PDN.       Interval History 12/13/2022:  Mrs Waters presents for follow up of chronic pain. She is ready to repeat Qutenza application as it has been >91 days and she had significant improvement of symptoms of PDN. She recently had repeat A1C and just above 10.0 but is decreasing. She continues to take medication with benefit and denies SE of medication. Medication regimen include Norco 10/325mg TID, Robaxin 500mg and Lyrica 150mg.      Interval History 10/12/2022:  Mrs Waters presents for follow up of chronic neuropathy. She is s/p qutenza patch placement with improved functioning and neuropathy control. Unfortunately her A1C was above 11 which inhibits her from Nevro SCS trial at this time. She is eager to have SCS trial. She denies new areas of pain or neurological changes. She continued to take  Norco 10/325mg TID, Robaxin 500mg and Lyrica 150mg TID with benefit and denies significant SE of medications.      Interval History 9/8/2022:  Mrs Waters presents for follow up of chronic lower back painn and radicular pain in conjunction with PDN to bilateral feet. She is doing fair with medication mgt of Norco, Robaxin, and Lyrica and does need refill at this time. She denies SE of medications. She is patiently awaiting her A1C to become lower than 10 to proceed with SCS trial.      Interval History 8/12/2022:  Mrs Waters presents for delayed FU. She has had continuous pain to lumbar and radicular pain but also peripheral neuropathy complaints. Over interval she has had CVA but doing fair. Her A1C has unfortunately elevated above 10 at this time and SCS trial placed on hold but phychiatric evaluation complete. She continues to take Norco 10/325mg TID, Robaxin 500mg TID and Lyrica 150mg TID with some benefit and denies SE of medications. No s/s concerning for cauda equina.      Interval History 4/12/2022:  Patient returns to clinic today for follow-up. Her neuropathic pain continues to be an issue for her, but she says that she is  able to manage. She is taking Norco 10-325mg TID, Robaxin 500mg TID, and Lyrica 150mg TID without any adverse side effects. She denies any changes in her symptoms. She would like to proceed with SCS trial. Discussed last time that her HbA1c should be <10, was 9.8 on most recent labs. Seen by psychology and cleared for SCS.      Interval History 2/14/2022:  Mrs Waters presents for follow up. Pt states overall neuropathy continues. Since last visit she has been stable with medication mgt and takign Norco 10/325mg TID, Robaxin 500mg TID and Lyrica 150mg TID. She denies new areas of pain or neurological changes. Medication adequate to control pain without adverse SE.      Interval History 12/21/2021:  Pt presents for urgent evaluation s/p fall in kitchen last night. Pt unsure of LOC or head trauma but states some amnesia of events. Pt is having left knee pain, B ankle pain L>R and lower back/buttock pain. Daughter further states tremor like activity last night. During visit daughter asked for bedside commode due to inability to ambulate.  Pt during visit appeared somnolent, pale and began vomiting. Discussed going to ER for further evaluation.      Interval History 12/14/2021:  Mrs Waters presents for follow up of chronic pain complaints. She is hopeful she will have A1C lower soon to move forward with SCS. She is doing fair with medication mgt alone at this time and denies SE of medications. Pt is currently taking Norco 10/325mg TID PRN, Lyrica 150mg TID, and Robaxin 500mg TID. She denies new areas of pain or neurological changes.      Interval History 10/14/2021:  The Pt presents for follow up and s/p B Lumbar sympathetic blocks. Pt states this has done well but ready to proceed with SCS trial. She is aware A1C must be under tight control and Pt and daughter re-iterate knowing this. Pt also mentions DKA admission and diagnosis of vasculitis as well over interval. Pt continues to take hydrocodone 10/325 mg TID PRN,  Lyrica 150 mg TID, and Robaxin 500mg TID. She denies any SE of medication, denies new neurological changes.      Interval Hx 09/16/2021  Indira Waters presents to the clinic for a follow-up appointment for f/u after bilateral lumbar sympathetic block on 8/25/21.Patient reports >50% relief after lumbar sympathetic block that lasted 2 weeks when she then developed a UTI/DKA, was admitted to the hospital and pain recurred.  Current pain intensity is 8/10.     Interval History 8/12/2021:  Indira Waters presents to the clinic for a follow-up appointment for BLE diabetic neuropathy, painful. Continues with Norco 10/325mg, Lyrica 150mg TID, Robaxin 750mg daily PRN. She had to cancel previously scheduled lumbar sympathetic block 2/2 lithotripsy for painful kidney stones, which have now passed. She still has intermittent pain with urination but overall that pain is improving. She had to cancel previous angiogram for this same reason - this has not been rescheduled yet. She denies any change in her LE pain. Denies any new neurological sxs in BLEs.      Interval History 6/10/21:  Indira Waters presents to the clinic for a 2 week follow-up appointment from lumbar sympathetic nerve block in early May. She reports minimal relief from this intervention, but did note that it helped some, briefly. Since the last visit, Indira Waters states the pain has been persistant. Current pain intensity is 10/10. Patient has chronic generalized diffuse pain, most pronounced in her BL lower extremities 2/2 diabetic neuropathy. HSe states that the pain is a little better than at her last visit. Most days are 10/10, but some days are better than others. Her pain is aggravated by exertion, walking, or sitting/standing for extended periods of time. He pain is mildly improved by rest and medications. She is currently taking Lyrica 150 PO TID, Zoloft, and Norco 10-325mg TID PRN. She states that the pain meds are helping but she is still  "constantly in pain and unable to participate in her ADLs. She has now established care with PCP for assistance w/ poorly controlled T2DM, last A1c 11.4. She has not yet established care w/ Rheumatology for fibromyalgia management.     Interval HPI 4/15/21:  Patient returns for follow up of chronic generalized diffuse pain. At the last visit, had EMG (not yet completed), lumbar and hip x-ray imaging ordered. She was also referred to Rheumatology for fibromyalgia management and referral to PCP for DM2 management and weaning of zoloft for transition to cymbalta for treatment of neuropathy. She had her Lyrica increased to 150mg PO TID, and prescribed Norco 10mg TID with opioid contract signed. She has been taking Norco 10mg TID and it has been helping her "bad" pains but does not take all of her pain away. She has not yet been set up with her new PCP or rheumatologist yet for fibromyalgia. Still continues to have generalized pain everywhere including feet, hips, legs, lower and upper back, neck and shoulder pain. Continues to have neuropathy in the bilateral lower extremities due to uncontrolled DM2.     Initial Visit 3/11/21:  Indira Waters presents to the clinic for the evaluation of chronic pain. Complaining of pain everywhere including feet, legs, hips, lower and upper back, neck, shoulder. Pain started 5+ years ago. Pain 10/10 at worse. She was referred here by her PCP Dr. Ramos who she has been seeing for over 6 years. She states her pain is aggravated by any physical activity/movement. She takes lyrica, robaxin, and Norco 10 TID with mild relief of pain. Per patient, she was referred here by her PCP for medication refill. She has not tried physical therapy for several years, she states it did not help last time she was in PT. She says she is trying to exercise daily by doing yoga. She walks with a walker. She has numerous comorbidities including DM2 (Last A1C 9+ per prior family medicine note in Jan 2021), " fibromyalgia, lupus, DDD. She states she would like to find a new PCP as she no longer lives near her old one. Patient denies night fever/night sweats, urinary incontinence, bowel incontinence and significant weight loss.        10/31/2024    10:55 AM   Last 3 PDI Scores   Pain Disability Index (PDI) 56         Pain Medications:  Norco 10-325mg TID, Robaxin 500mg TID, and Lyrica 150mg TID     Opioid Contract: yes      report:  Reviewed and consistent with medication use as prescribed.     Pain Procedures:    - reports possible back injection 10+ years ago  - Lumbar sympathetic nerve block 21 - Dr. Pereira - minimal relief     Physical Therapy/Home Exercise: no     Imaging:   Hip X-ray 21  FINDINGS:  Osseous structures appear intact without evidence of fracture or osseous destructive process.  No apparent dislocation.     Modest degenerative change or significant joint space narrowing.     Lumbar X-ray 21  FINDINGS:  Diffuse bony demineralization.  Vertebral bodies are normal in height without evidence of fracture.  No pars defects.     Normal sagittal alignment is preserved.  No spondylolisthesis. No abnormal translation with flexion and extension.     Intervertebral disc heights are well maintained.  Mild facet arthropathy in the lower lumbar spine.     Mild scattered vascular calcification.     Impression:     No evidence of fracture or malalignment.     Mild facet arthropathy in the lower lumbar spine.     Diffuse bony demineralization.  Consider correlation with DEXA.         Past Medical History:   Diagnosis Date    Arthritis     Back pain     Cancer     ovarian    Cervical cancer     Coronary artery disease     Depression     Diabetes mellitus     Fibromyalgia     Heart attack     History of MI (myocardial infarction)     Hyperlipidemia     Hypertension     Lupus     Stroke     slight left sided weakness     Past Surgical History:   Procedure Laterality Date    APPENDECTOMY        SECTION      2    CORONARY ANGIOGRAPHY N/A 2022    Procedure: ANGIOGRAM, CORONARY ARTERY;  Surgeon: Jose L Méndez MD;  Location: [facility] CATH LAB;  Service: Cardiology;  Laterality: N/A;    HYSTERECTOMY      with USO for cervical cancer    INJECTION OF ANESTHETIC AGENT AROUND NERVE Bilateral 2021    Procedure: BLOCK, NERVE, SYMPATHIC;  Surgeon: Holden Pereira MD;  Location: [facility] PAIN MGT;  Service: Pain Management;  Laterality: Bilateral;    INJECTION OF ANESTHETIC AGENT AROUND NERVE N/A 2021    Procedure: BLOCK, NERVE, SYMPATHETIC  need consent;  Surgeon: Holden Pereira MD;  Location: [facility] PAIN MGT;  Service: Pain Management;  Laterality: N/A;    YARED LINK,SWALLOW FUNCTION,CINE/VIDEO RECORD  2021         TONSILLECTOMY      TRIAL OF SPINAL CORD NERVE STIMULATOR N/A 3/8/2023    Procedure: LUMBAR SPINAL CORD STIMULATOR TRIAL NEVRO REP PATIENT STATES SHE NO LONGER TAKES PLAVIX;  Surgeon: Holden Pereira MD;  Location: [facility] PAIN MGT;  Service: Pain Management;  Laterality: N/A;     Social History     Socioeconomic History    Marital status:    Tobacco Use    Smoking status: Former     Current packs/day: 0.00     Types: Cigarettes     Quit date: 2020     Years since quittin.2     Passive exposure: Past    Smokeless tobacco: Never   Substance and Sexual Activity    Alcohol use: Not Currently     Comment: occasionally    Drug use: Yes     Types: Hydrocodone     Comment: three times a day    Sexual activity: Not Currently   Social History Narrative    Lives with daughter. .      Social Drivers of Health     Financial Resource Strain: Low Risk  (2024)    Overall Financial Resource Strain (CARDIA)     Difficulty of Paying Living Expenses: Not hard at all   Food Insecurity: No Food Insecurity (2024)    Hunger Vital Sign     Worried About Running Out of Food in the Last Year: Never true     Ran Out of Food in the Last Year: Never true   Transportation Needs: No  "Transportation Needs (7/20/2024)    TRANSPORTATION NEEDS     Transportation : No   Physical Activity: Inactive (7/20/2024)    Exercise Vital Sign     Days of Exercise per Week: 0 days     Minutes of Exercise per Session: 0 min   Stress: No Stress Concern Present (7/20/2024)    [employer] - Occupational Stress Questionnaire     Feeling of Stress : Not at all   Housing Stability: Low Risk  (7/20/2024)    Housing Stability Vital Sign     Unable to Pay for Housing in the Last Year: No     Homeless in the Last Year: No     Family History   Problem Relation Name Age of Onset    COPD Mother      Lupus Mother      Hernia Mother      Uterine cancer Mother          vs cervical cancer    Ovarian cancer Mother      Diabetes Father      Coronary artery disease Father      Colon cancer Maternal Grandmother          in her 50's       Review of patient's allergies indicates:   Allergen Reactions    Bleach (sodium hypochlorite) Shortness Of Breath    Nitrofurantoin macrocrystalline Anaphylaxis    Lipitor [atorvastatin] Diarrhea and Rash    Nsaids (non-steroidal anti-inflammatory drug)      Tolerates aspirin      Pcn [penicillins]     Statins-hmg-coa reductase inhibitors     Toradol [ketorolac]        Current Outpatient Medications   Medication Sig    acetaminophen (TYLENOL) 500 MG tablet Take 2 tablets (1,000 mg total) by mouth every 8 (eight) hours as needed for Pain.    albuterol (ACCUNEB) 1.25 mg/3 mL Nebu Take 3 mLs (1.25 mg total) by nebulization every 6 (six) hours as needed (for wheezing or shortness of breath). Rescue    allopurinoL (ZYLOPRIM) 300 MG tablet Take 1 tablet (300 mg total) by mouth once daily.    aspirin (ECOTRIN) 81 MG EC tablet Take 1 tablet (81 mg total) by mouth once daily.    BD ULTRA-FINE MICRO PEN NEEDLE 32 gauge x 1/4" Ndle Use with insulin 5 times a day.    blood sugar diagnostic Strp To check BG 6 times daily, to use with insurance preferred meter    blood-glucose meter " kit To check BG 6 times daily, to use with insurance preferred meter    evolocumab (REPATHA SURECLICK) 140 mg/mL PnIj Inject 1 mL (140 mg total) into the skin every 14 (fourteen) days.    fenofibrate micronized (LOFIBRA) 134 MG Cap Take 1 capsule (134 mg total) by mouth daily with breakfast.    insulin glargine U-100, Lantus, (LANTUS SOLOSTAR U-100 INSULIN) 100 unit/mL (3 mL) InPn pen Inject 20 Units into the skin 2 (two) times a day.    insulin lispro (HUMALOG KWIKPEN INSULIN) 100 unit/mL pen Inject 13 Units into the skin before breakfast AND 13 Units daily with lunch AND 11 Units daily with dinner or evening meal. Plus correction scale. Max TDD 50units..    ipratropium-albuteroL (COMBIVENT RESPIMAT)  mcg/actuation inhaler Inhale 1 puff into the lungs every 6 (six) hours.    lancets (TRUEPLUS LANCETS) 30 gauge Misc Check blood sugar 4 times daily    losartan (COZAAR) 50 MG tablet Take 1 tablet (50 mg total) by mouth once daily.    melatonin (MELATIN) 3 mg tablet Take 2 tablets (6 mg total) by mouth nightly as needed for Insomnia.    methocarbamoL (ROBAXIN) 500 MG Tab Take 500 mg by mouth 4 (four) times daily.    methocarbamoL (ROBAXIN) 500 MG Tab TAKE ONE TABLET BY MOUTH 3 TIMES DAILY    metoclopramide HCl (REGLAN) 5 MG tablet TAKE ONE TABLET BY MOUTH FOUR TIMES DAILY AS NEEDED FOR nausea prevention    metoprolol succinate (TOPROL-XL) 100 MG 24 hr tablet Take 1 tablet (100 mg total) by mouth once daily.    miconazole nitrate 2% (MICOTIN) 2 % Oint Apply topically 2 (two) times daily.    NIFEdipine (PROCARDIA-XL) 30 MG (OSM) 24 hr tablet Take 1 tablet (30 mg total) by mouth 2 (two) times a day.    nitroGLYCERIN (NITROSTAT) 0.4 MG SL tablet DISSOLVE 1 TABLET UNDER THE TONGUE EVERY 5 MINUTES AS NEEDED FOR CHEST PAIN. DO NOT EXCEED A TOTAL OF 3 DOSES IN 15 MINUTES.    pregabalin (LYRICA) 75 MG capsule TAKE ONE CAPSULE BY MOUTH 3 TIMES DAILY    senna-docusate 8.6-50 mg (PERICOLACE) 8.6-50 mg per tablet Take 1  tablet by mouth 2 (two) times daily as needed for Constipation.    sertraline (ZOLOFT) 100 MG tablet TAKE ONE TABLET BY MOUTH EVERY DAY    torsemide (DEMADEX) 20 MG Tab Take 1 tablet (20 mg total) by mouth once daily.     No current facility-administered medications for this visit.       ROS:  GENERAL: No fever. No chills. No fatigue. Denies weight loss. Denies weight gain.  HEENT: Denies headaches. Denies vision change. Denies eye pain. Denies double vision. Denies ear pain.   CV: Denies chest pain.   PULM: Denies of shortness of breath.  GI: Denies constipation. No diarrhea. No abdominal pain. Denies nausea. Denies vomiting. No blood in stool.  HEME: Denies bleeding problems.  : Denies urgency. No painful urination. No blood in urine.  MS: Denies joint stiffness. Denies joint swelling.    SKIN: Denies rash.   NEURO: Denies seizures. No weakness.  PSYCH:  Denies difficulty sleeping. No anxiety. Denies depression. No suicidal thoughts.       VITALS: There were no vitals filed for this visit.      Virtual PHYSICAL EXAM:   GENERAL: Well appearing, in no acute distress, alert and oriented x3.  PSYCH:  Mood and affect appropriate.      Previous PHYSICAL EXAM:   GENERAL: Well appearing, in no acute distress, alert and oriented x3.  PSYCH:  Mood and affect appropriate.  SKIN: Skin color, texture, turgor normal, no rashes or lesions.  HEENT:  Normocephalic, atraumatic. Cranial nerves grossly intact.  NECK:   Negative for pain to palpation over the cervical paraspinous muscles.   PULM: No evidence of respiratory difficulty, symmetric chest rise.  GI:  Non-distended  BACK:   Normal range of motion.   POSITIVE for pain to palpation over the spinous processes.  POSITIVE for pain to palpation over facet joints.   POSITIVE axial loading test bilateral. L>R   POSITIVE for tenderness over BILATERAL SIJ.  L>R  Unable to perform SI provacative maneuvers due to patient's pain  POSITIVE MULTIFIDUS LIFT TEST  POSITIVE PRONE  INSTABILITY TEST  EXTREMITIES:   No deformities, edema, or skin discoloration.   No atrophy is noted.   POSITIVE for LEFT knee limited ROM with tenderness on palpation and crepitus on movement  +TTP on L GTP  + L piriformis tenderness to palpation  NEURO: Sensation is equal and appropriate bilaterally. Bilateral upper and lower extremity strength is normal and symmetric. Bilateral upper and lower extremity coordination and muscle stretch reflexes are physiologic and symmetric. Plantar response are downgoing. Straight leg raising in the sitting position is POSITIVE to radicular pain.   GAIT: Antalgic gait        ASSESSMENT: 79 y.o. year old with low back pain, consistent with:    1. 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 Painful diabetic neuropathy  pregabalin (LYRICA) 150 MG capsule      3. Chronic pain syndrome  pregabalin (LYRICA) 150 MG capsule    Procedure Request Order for Pain Management      4. Primary osteoarthritis of both knees  pregabalin (LYRICA) 150 MG capsule                PLAN:    Patient Education:  Discussed the importance of physical therapy, activity modification, and a home exercise plan to help with pain and improve health.  Therapy referral:  Continue HEP.   Medications:   Patient can continue with pain medications for now per their provider since they are providing benefits, using them appropriately, and without side effects.  Prescribed Lyrica, Robaxin, and Norco 10 TID.   Norco 10 was last filled on 12/27/24, #90. Will sign for refill today.   Will change Lyrica to 150mg BID.   Schedule pain intervention for: Reactiv8 procedure. Patient underwent psych evaluation for neuromodulation on 3/3/2022 with Dr. Nieves, which she cleared.  At her previous visit on 10/31/24, she had a positive multifidus lift test and positive prone instability test. This in combination with her lower back pain suggests the patient would benefit  greatly from implantation of the Reactiv8 device. Discussed the procedure in depth with the patient, all questions answered successfully.    Future consideration: Physical therapy once the Reactiv8 has been placed to help with further strengthening and mobility.   Counseled patient: regarding the importance of activity modification, constant sleeping habits, and physical therapy.  Return to clinic: in four weeks. If patient obtains insurance approval for Reactiv8 she will follow up with [facility].           Allan Jason  I have personally reviewed the history and personally discussed the plan with patient through video visit and agree with the resident/fellow/NPs note as stated above.    Holden Pereira MD    01/23/2025                   BPH (Benign Prostatic Hypertrophy)

## 2025-01-24 DIAGNOSIS — E11.40 PAINFUL DIABETIC NEUROPATHY: ICD-10-CM

## 2025-01-24 DIAGNOSIS — M54.16 LUMBAR RADICULOPATHY, CHRONIC: ICD-10-CM

## 2025-01-24 DIAGNOSIS — M17.0 PRIMARY OSTEOARTHRITIS OF BOTH KNEES: ICD-10-CM

## 2025-01-24 DIAGNOSIS — G89.4 CHRONIC PAIN DISORDER: Primary | ICD-10-CM

## 2025-01-24 RX ORDER — INSULIN GLARGINE 100 [IU]/ML
26 INJECTION, SOLUTION SUBCUTANEOUS 2 TIMES DAILY
Qty: 60 ML | Refills: 3 | Status: SHIPPED | OUTPATIENT
Start: 2025-01-24

## 2025-01-24 RX ORDER — INSULIN LISPRO 100 [IU]/ML
INJECTION, SOLUTION INTRAVENOUS; SUBCUTANEOUS
Qty: 40 ML | Refills: 3 | Status: SHIPPED | OUTPATIENT
Start: 2025-01-24 | End: 2026-01-24

## 2025-01-28 DIAGNOSIS — M17.0 PRIMARY OSTEOARTHRITIS OF BOTH KNEES: ICD-10-CM

## 2025-01-28 DIAGNOSIS — E11.40 PAINFUL DIABETIC NEUROPATHY: ICD-10-CM

## 2025-01-28 DIAGNOSIS — M54.16 LUMBAR RADICULOPATHY, CHRONIC: ICD-10-CM

## 2025-01-28 DIAGNOSIS — G89.4 CHRONIC PAIN DISORDER: Primary | ICD-10-CM

## 2025-01-28 RX ORDER — HYDROCODONE BITARTRATE AND ACETAMINOPHEN 10; 325 MG/1; MG/1
1 TABLET ORAL EVERY 8 HOURS PRN
Qty: 90 TABLET | Refills: 0 | OUTPATIENT
Start: 2025-01-28 | End: 2025-02-27

## 2025-01-28 RX ORDER — HYDROCODONE BITARTRATE AND ACETAMINOPHEN 10; 325 MG/1; MG/1
1 TABLET ORAL EVERY 8 HOURS PRN
Qty: 90 TABLET | Refills: 0 | Status: SHIPPED | OUTPATIENT
Start: 2025-01-28 | End: 2025-02-27

## 2025-01-28 NOTE — TELEPHONE ENCOUNTER
Patient requesting refill on Norco  Last office visit 1-   shows last refill on 12-  Patient does have a pain contract on file with [facility]  Patient last UDS 9- was consistent with current therapy     CODEINE   Not Detected         Not Detected CM     Comment: INTERPRETIVE INFORMATION: Codeine, U  Positive Cutoff: 40 ng/mL  Methodology: Mass Spectrometry   MORPHINE   Not Detected         Not Detected CM     Comment: INTERPRETIVE INFORMATION:Morphine, U  Positive Cutoff: 20 ng/mL  Methodology: Mass Spectrometry   6-ACETYLMORPHINE   Not Detected         Not Detected CM     Comment: INTERPRETIVE INFORMATION:6-acetylmorphine, U  Positive Cutoff: 20 ng/mL  Methodology: Mass Spectrometry   OXYCODONE   Not Detected         Not Detected CM     Comment: INTERPRETIVE INFORMATION:Oxycodone, U  Positive Cutoff: 40 ng/mL  Methodology: Mass Spectrometry   NOROYXCODONE   Not Detected         Not Detected CM     Comment: INTERPRETIVE INFORMATION:Noroxycodone, U  Positive Cutoff: 100 ng/mL  Methodology: Mass Spectrometry   OXYMORPHONE   Not Detected         Not Detected CM     Comment: INTERPRETIVE INFORMATION:Oxymorphone, U  Positive Cutoff: 40 ng/mL  Methodology: Mass Spectrometry   NOROXYMORPHONE   Not Detected         Not Detected CM     Comment: INTERPRETIVE INFORMATION:Noroxymorphone, U  Positive Cutoff: 100 ng/mL  Methodology: Mass Spectrometry   HYDROCODONE   Present         Present CM     Comment: INTERPRETIVE INFORMATION:Hydrocodone, U  Positive Cutoff: 40 ng/mL  Methodology: Mass Spectrometry   NORHYDROCODONE   Present         Present CM     Comment: INTERPRETIVE INFORMATION:Norhydrocodone, U  Positive Cutoff: 100 ng/mL  Methodology: Mass Spectrometry   HYDROMORPHONE   Present         Present CM     Comment: INTERPRETIVE INFORMATION:Hydromorphone, U  Positive Cutoff: 20 ng/mL  Methodology: Mass Spectrometry   BUPRENORPHINE   Not Detected         Not Detected CM      Comment: INTERPRETIVE INFORMATION:Buprenorphine, U  Positive Cutoff: 5 ng/mL  Methodology: Mass Spectrometry   NORUBPRENORPHINE   Not Detected         Not Detected CM     Comment: INTERPRETIVE INFORMATION:Norbuprenorphine, U  Positive Cutoff: 20 ng/mL  Methodology: Mass Spectrometry   FENTANYL   Not Detected         Not Detected CM     Comment: INTERPRETIVE INFORMATION:Fentanyl, U  Positive Cutoff: 2 ng/mL  Methodology: Mass Spectrometry   NORFENTANYL   Not Detected         Not Detected CM     Comment: INTERPRETIVE INFORMATION:Norfentanyl, U  Positive Cutoff: 2 ng/mL  Methodology: Mass Spectrometry   MEPERIDINE METABOLITE   Not Detected         Not Detected CM     Comment: INTERPRETIVE INFORMATION:Meperidine metabolite, U  Positive Cutoff: 50 ng/mL  Methodology: Mass Spectrometry   TAPENTADOL   Not Detected         Not Detected CM     Comment: INTERPRETIVE INFORMATION:Tapentadol, U  Positive Cutoff: 100 ng/mL  Methodology: Mass Spectrometry   TAPENTADOL-O-SULF   Not Detected         Not Detected CM     Comment: INTERPRETIVE INFORMATION:Tapentadol-o-Sulf, U  Positive Cutoff: 200 ng/mL  Methodology: Mass Spectrometry   METHADONE   Negative         Negative CM     Comment: Presumptive negative by immunoassay. Testing by mass  spectrometry is available on request.  INTERPRETIVE INFORMATION: Methadone Screen, U  Positive Cutoff: 150 ng/mL  Methodology: Immunoassay   TRAMADOL   Negative         Negative CM     Comment: Presumptive negative by immunoassay. Testing by mass  spectrometry is available on request.  INTERPRETIVE INFORMATION:Tramadol Screen, U  Positive Cutoff: 100 ng/mL  Methodology: Immunoassay   AMPHETAMINE   Not Detected         Not Detected CM     Comment: INTERPRETIVE INFORMATION:Amphetamine, U  Positive Cutoff: 50 ng/mL  Methodology: Mass Spectrometry   METHAMPHETAMINE   Not Detected         Not Detected CM     Comment: INTERPRETIVE INFORMATION:Methamphetamine, U  Positive Cutoff: 200  ng/mL  Methodology: Mass Spectrometry   MDMA- ECSTASY   Not Detected         Not Detected CM     Comment: INTERPRETIVE INFORMATION:MDMA, U  Positive Cutoff: 200 ng/mL  Methodology: Mass Spectrometry   MDA   Not Detected         Not Detected CM     Comment: INTERPRETIVE INFORMATION:MDA, U  Positive Cutoff: 200 ng/mL  Methodology: Mass Spectrometry   MDEA- Mary   Not Detected         Not Detected CM     Comment: INTERPRETIVE INFORMATION:MDEA, U  Positive Cutoff: 200 ng/mL  Methodology: Mass Spectrometry   METHYLPHENIDATE   Not Detected         Not Detected CM     Comment: INTERPRETIVE INFORMATION:Methylphenidate, U  Positive Cutoff: 100 ng/mL  Methodology: Mass Spectrometry   PHENTERMINE   Not Detected         Not Detected CM     Comment: INTERPRETIVE INFORMATION:Phentermine, U  Positive Cutoff: 100 ng/mL  Methodology: Mass Spectrometry   BENZOYLECGONINE   Negative         Negative CM     Comment: Presumptive negative by immunoassay. Testing by mass  spectrometry is available on request.  INTERPRETIVE INFORMATION:Cocaine Screen, U  Positive Cutoff: 150 ng/mL  Methodology: Immunoassay   ALPRAZOLAM   Not Detected         Not Detected CM     Comment: INTERPRETIVE INFORMATION:Alprazolam, U  Positive Cutoff: 40 ng/mL  Methodology: Mass Spectrometry   ALPHA-OH-ALPRAZOLAM   Not Detected         Not Detected CM     Comment: INTERPRETIVE INFORMATION:Alpha-OH-Alprazolam, U  Positive Cutoff: 20 ng/mL  Methodology: Mass Spectrometry   CLONAZEPAM   Not Detected         Not Detected CM     Comment: INTERPRETIVE INFORMATION:Clonazepam, U  Positive Cutoff: 20 ng/mL  Methodology: Mass Spectrometry   7-AMINOCLONAZEPAM   Not Detected         Not Detected CM     Comment: INTERPRETIVE INFORMATION:7-Aminoclonazepam, U  Positive Cutoff: 40 ng/mL  Methodology: Mass Spectrometry   DIAZEPAM   Not Detected         Not Detected CM     Comment: INTERPRETIVE INFORMATION:Diazepam, U  Positive Cutoff: 50 ng/mL  Methodology: Mass Spectrometry    NORDIAZEPAM   Not Detected         Not Detected CM     Comment: INTERPRETIVE INFORMATION:Nordiazepam, U  Positive Cutoff: 50 ng/mL  Methodology: Mass Spectrometry   OXAZEPAM   Not Detected         Not Detected CM     Comment: INTERPRETIVE INFORMATION:Oxazepam, U  Positive Cutoff: 50 ng/mL  Methodology: Mass Spectrometry   TEMAZEPAM   Not Detected         Not Detected CM     Comment: INTERPRETIVE INFORMATION:Temazepam, U  Positive Cutoff: 50 ng/mL  Methodology: Mass Spectrometry   Lorazepam   Not Detected         Not Detected CM     Comment: INTERPRETIVE INFORMATION:Lorazepam, U  Positive Cutoff: 60 ng/mL  Methodology: Mass Spectrometry   MIDAZOLAM   Not Detected         Not Detected CM     Comment: INTERPRETIVE INFORMATION:Midazolam, U  Positive Cutoff: 20 ng/mL  Methodology: Mass Spectrometry   ZOLPIDEM   Not Detected         Not Detected CM     Comment: INTERPRETIVE INFORMATION:Zolpidem, U  Positive Cutoff: 20 ng/mL  Methodology: Mass Spectrometry   BARBITURATES   Negative         Negative CM     Comment: Presumptive negative by immunoassay. Testing by mass  spectrometry is available on request.  INTERPRETIVE INFORMATION:Barbiturates Screen, U  Positive Cutoff: 200 ng/mL  Methodology: Immunoassay   Creatinine, Urine 20.0 - 400.0 mg/dL <20.0 15.1 R 15.1 R 38.4 R 37.4 R <20.0 CM 24.0 R   Comment: Creatinine <20 mg/dL is consistent with a dilute urine  specimen. Recollection to obtain a more concentrated  specimen, such as a first morning void, may be considered  if unexpected negative urine drug screening results were  obtained.   ETHYL GLUCURONIDE   Negative         Negative CM     Comment: Presumptive negative by immunoassay. Testing by mass  spectrometry is available on request.  INTERPRETIVE INFORMATION:Ethyl Glucuronide Screen, U  Positive Cutoff: 500 ng/mL  Methodology: Immunoassay   MARIJUANA METABOLITE   Negative         Negative CM     Comment: Presumptive negative by immunoassay. Testing by  mass  spectrometry is available on request.  INTERPRETIVE INFORMATION: THC (Cannabinoids) Screen, U  Positive Cutoff: 50 ng/mL  Methodology: Immunoassay   PCP   Negative         Negative CM     Comment: Presumptive negative by immunoassay. Testing by mass  spectrometry is available on request.  INTERPRETIVE INFORMATION:Phencyclidine Screen, U  Positive Cutoff: 25 ng/mL  Methodology: Immunoassay   CARISOPRODOL   Negative         Negative CM     Comment: Presumptive negative by immunoassay. Testing by mass  spectrometry is available on request.  INTERPRETIVE INFORMATION: Carisoprodol Screen, U  Positive Cutoff: 100 ng/mL  Methodology: Immunoassay  The carisoprodol immunoassay has cross-reactivity to  carisoprodol and meprobamate.   Naloxone   Not Detected         Not Detected CM     Comment: INTERPRETIVE INFORMATION:Naloxone, U  Positive Cutoff: 100 ng/mL  Methodology: Mass Spectrometry   Gabapentin   Not Detected         Not Detected CM     Comment: INTERPRETIVE INFORMATION:Gabapentin, U  Positive Cutoff: 3,000 ng/mL  Methodology: Mass Spectrometry   Pregabalin   Present         Present CM     Comment: INTERPRETIVE INFORMATION:Pregabalin, U  Positive Cutoff: 3,000 ng/mL  Methodology: Mass Spectrometry   Alpha-OH-Midazolam   Not Detected         Not Detected CM     Comment: INTERPRETIVE INFORMATION:Alpha-OH-Midazolam, U  Positive Cutoff: 20 ng/mL  Methodology: Mass Spectrometry   Zolpidem Metabolite   Not Detected         Not Detected CM     Comment: INTERPRETIVE INFORMATION:Zolpidem Metabolite, U  Positive Cutoff: 100 ng/mL  Methodology: Mass Spectrometry   PAIN MANAGEMENT DRUG PANEL   See Below         See Below CM

## 2025-01-28 NOTE — TELEPHONE ENCOUNTER
----- Message from Sendy sent at 1/28/2025  9:15 AM CST -----  Type: RX Refill Request    Who called: Patient Daughter    Refill or New Rx: Refill     Rx name and Strength:HYDROcodone-acetaminophen (NORCO)  mg per tablet    Requested yesterday ; pharmacy stated they did not receive prescription ; please advise    Would the patient rather a call back or a response via [facility]? Call    Best call back number: [phone]    Additional information  [facility] - [address] [address]  Phone: [phone] Fax: [phone]

## 2025-01-28 NOTE — TELEPHONE ENCOUNTER
Patient keeps calling in requests, causing delays with review as its pinging for duplicate request. Refill request currently under review with provider

## 2025-01-29 ENCOUNTER — PATIENT MESSAGE (OUTPATIENT)
Dept: INFECTIOUS DISEASES | Facility: HOSPITAL | Age: 80
End: 2025-01-29
Payer: MEDICARE

## 2025-01-29 ENCOUNTER — HOSPITAL ENCOUNTER (OUTPATIENT)
Dept: RADIOLOGY | Facility: OTHER | Age: 80
Discharge: HOME | End: 2025-01-29
Attending: NURSE PRACTITIONER
Payer: MEDICARE

## 2025-01-29 ENCOUNTER — TELEPHONE (OUTPATIENT)
Dept: INFECTIOUS DISEASES | Facility: HOSPITAL | Age: 80
End: 2025-01-29
Payer: MEDICARE

## 2025-01-29 ENCOUNTER — PATIENT MESSAGE (OUTPATIENT)
Dept: PAIN MEDICINE | Facility: OTHER | Age: 80
End: 2025-01-29
Payer: MEDICARE

## 2025-01-29 DIAGNOSIS — G89.4 CHRONIC PAIN SYNDROME: Primary | ICD-10-CM

## 2025-01-29 DIAGNOSIS — M62.85 DYSFUNCTION OF THE MULTIFIDUS MUSCLE OF LUMBAR REGION: ICD-10-CM

## 2025-01-29 DIAGNOSIS — M81.0 AGE-RELATED OSTEOPOROSIS WITHOUT CURRENT PATHOLOGICAL FRACTURE: ICD-10-CM

## 2025-01-29 PROCEDURE — 77080 DXA BONE DENSITY AXIAL: CPT | Mod: 26,,, | Performed by: RADIOLOGY

## 2025-01-29 PROCEDURE — 77080 DXA BONE DENSITY AXIAL: CPT | Mod: TC

## 2025-01-31 ENCOUNTER — PATIENT MESSAGE (OUTPATIENT)
Dept: ENDOCRINOLOGY | Facility: CLINIC | Age: 80
End: 2025-01-31
Payer: MEDICARE

## 2025-02-04 PROBLEM — M62.85 DYSFUNCTION OF THE MULTIFIDUS MUSCLE OF LUMBAR REGION: Status: ACTIVE | Noted: 2025-02-04

## 2025-02-04 PROBLEM — G89.29 CHRONIC PAIN: Chronic | Status: RESOLVED | Noted: 2021-08-25 | Resolved: 2025-02-04

## 2025-02-04 PROBLEM — M17.0 PRIMARY OSTEOARTHRITIS OF BOTH KNEES: Status: ACTIVE | Noted: 2025-02-04

## 2025-02-04 PROBLEM — E11.40 PAINFUL DIABETIC NEUROPATHY: Status: ACTIVE | Noted: 2025-02-04

## 2025-02-12 DIAGNOSIS — G89.4 CHRONIC PAIN SYNDROME: ICD-10-CM

## 2025-02-12 DIAGNOSIS — E11.40 PAINFUL DIABETIC NEUROPATHY: ICD-10-CM

## 2025-02-12 DIAGNOSIS — M17.0 PRIMARY OSTEOARTHRITIS OF BOTH KNEES: ICD-10-CM

## 2025-02-13 DIAGNOSIS — J41.1 MUCOPURULENT CHRONIC BRONCHITIS: ICD-10-CM

## 2025-02-13 DIAGNOSIS — J44.9 CHRONIC OBSTRUCTIVE PULMONARY DISEASE, UNSPECIFIED COPD TYPE: ICD-10-CM

## 2025-02-13 RX ORDER — ALBUTEROL SULFATE 1.25 MG/3ML
1.25 SOLUTION RESPIRATORY (INHALATION) EVERY 6 HOURS PRN
Qty: 75 ML | Refills: 11 | Status: SHIPPED | OUTPATIENT
Start: 2025-02-13 | End: 2026-02-13

## 2025-02-13 NOTE — TELEPHONE ENCOUNTER
No care due was identified.  [facility] Embedded Care Due Messages. Reference number: [id number].   2/13/2025 8:27:51 AM CST

## 2025-02-14 RX ORDER — PREGABALIN 150 MG/1
150 CAPSULE ORAL 2 TIMES DAILY
Qty: 60 CAPSULE | Refills: 5 | Status: SHIPPED | OUTPATIENT
Start: 2025-02-14 | End: 2025-08-13

## 2025-02-24 ENCOUNTER — PATIENT MESSAGE (OUTPATIENT)
Dept: ENDOCRINOLOGY | Facility: CLINIC | Age: 80
End: 2025-02-24
Payer: MEDICARE

## 2025-02-24 DIAGNOSIS — M54.16 LUMBAR RADICULOPATHY, CHRONIC: ICD-10-CM

## 2025-02-24 DIAGNOSIS — E11.40 PAINFUL DIABETIC NEUROPATHY: ICD-10-CM

## 2025-02-24 DIAGNOSIS — M17.0 PRIMARY OSTEOARTHRITIS OF BOTH KNEES: ICD-10-CM

## 2025-02-24 DIAGNOSIS — G89.4 CHRONIC PAIN DISORDER: ICD-10-CM

## 2025-02-25 ENCOUNTER — PATIENT MESSAGE (OUTPATIENT)
Dept: OTOLARYNGOLOGY | Facility: CLINIC | Age: 80
End: 2025-02-25
Payer: MEDICARE

## 2025-02-25 DIAGNOSIS — K31.84 GASTROPARESIS: ICD-10-CM

## 2025-02-25 RX ORDER — METOCLOPRAMIDE 5 MG/1
TABLET ORAL
Qty: 90 TABLET | Refills: 3 | Status: SHIPPED | OUTPATIENT
Start: 2025-02-25

## 2025-02-25 NOTE — TELEPHONE ENCOUNTER
No care due was identified.  [facility] Embedded Care Due Messages. Reference number: [id number].   2/25/2025 8:02:44 AM CST

## 2025-02-26 NOTE — TELEPHONE ENCOUNTER
Patient requesting refill on Norco  Last office visit 1-   shows last refill on 01-28.25   Patient does have a pain contract on file with [facility] department  Patient last UDS 9- was consistent with current therapy   prostate tissue

## 2025-02-27 RX ORDER — HYDROCODONE BITARTRATE AND ACETAMINOPHEN 10; 325 MG/1; MG/1
1 TABLET ORAL EVERY 8 HOURS PRN
Qty: 90 TABLET | Refills: 0 | Status: SHIPPED | OUTPATIENT
Start: 2025-02-27 | End: 2025-03-29

## 2025-03-03 ENCOUNTER — PATIENT MESSAGE (OUTPATIENT)
Dept: ADMINISTRATIVE | Facility: OTHER | Age: 80
End: 2025-03-03
Payer: MEDICARE

## 2025-03-05 ENCOUNTER — TELEPHONE (OUTPATIENT)
Dept: PAIN MEDICINE | Facility: CLINIC | Age: 80
End: 2025-03-05
Payer: MEDICARE

## 2025-03-05 ENCOUNTER — HOSPITAL ENCOUNTER (OUTPATIENT)
Dept: PREADMISSION TESTING | Facility: OTHER | Age: 80
Discharge: HOME | End: 2025-03-05
Payer: MEDICARE

## 2025-03-05 NOTE — TELEPHONE ENCOUNTER
----- Message from Med Assistant Dan sent at 3/5/2025  8:32 AM CST -----  Name of Who is Calling: Esha daughter of GIACOMO, CAYLA MCKEON [[id number]]  What is the request in detail: Pt needs to cancel procedure due to price out of pocket. Pt cannot afford it.   Can the clinic reply by [facility]: No  What Number [facility] Back [facility]: [phone]

## 2025-03-05 NOTE — TELEPHONE ENCOUNTER
----- Message from Yolanda sent at 3/5/2025  8:41 AM CST -----  Regarding: appt  Name of Who is Calling:Daughter What is the request in detail: Requesting to cancel upcoming surgery in regards to procedure being 3,000Can the clinic reply by [facility]: yes What [facility]:Telephone Information:[employer]          [phone]

## 2025-03-06 ENCOUNTER — OFFICE VISIT (OUTPATIENT)
Dept: INTERNAL MEDICINE | Facility: CLINIC | Age: 80
End: 2025-03-06
Payer: MEDICARE

## 2025-03-06 ENCOUNTER — OFFICE VISIT (OUTPATIENT)
Dept: OTOLARYNGOLOGY | Facility: CLINIC | Age: 80
End: 2025-03-06
Payer: MEDICARE

## 2025-03-06 ENCOUNTER — PATIENT MESSAGE (OUTPATIENT)
Dept: PAIN MEDICINE | Facility: OTHER | Age: 80
End: 2025-03-06
Payer: MEDICARE

## 2025-03-06 ENCOUNTER — LAB VISIT (OUTPATIENT)
Dept: LAB | Facility: OTHER | Age: 80
End: 2025-03-06
Attending: STUDENT IN AN ORGANIZED HEALTH CARE EDUCATION/TRAINING PROGRAM
Payer: MEDICARE

## 2025-03-06 ENCOUNTER — CLINICAL SUPPORT (OUTPATIENT)
Dept: OTOLARYNGOLOGY | Facility: CLINIC | Age: 80
End: 2025-03-06
Payer: MEDICARE

## 2025-03-06 VITALS
DIASTOLIC BLOOD PRESSURE: 66 MMHG | HEIGHT: 61 IN | OXYGEN SATURATION: 98 % | SYSTOLIC BLOOD PRESSURE: 150 MMHG | RESPIRATION RATE: 18 BRPM | HEIGHT: 61 IN | DIASTOLIC BLOOD PRESSURE: 78 MMHG | WEIGHT: 200.81 LBS | BODY MASS INDEX: 37.91 KG/M2 | SYSTOLIC BLOOD PRESSURE: 157 MMHG | TEMPERATURE: 98 F | HEART RATE: 73 BPM | HEART RATE: 70 BPM | WEIGHT: 200.63 LBS | BODY MASS INDEX: 37.88 KG/M2

## 2025-03-06 DIAGNOSIS — E11.22 TYPE 2 DIABETES MELLITUS WITH CHRONIC KIDNEY DISEASE, WITH LONG-TERM CURRENT USE OF INSULIN, UNSPECIFIED CKD STAGE: Primary | ICD-10-CM

## 2025-03-06 DIAGNOSIS — I50.43 ACUTE ON CHRONIC COMBINED SYSTOLIC AND DIASTOLIC CONGESTIVE HEART FAILURE: ICD-10-CM

## 2025-03-06 DIAGNOSIS — H61.23 BILATERAL IMPACTED CERUMEN: Primary | ICD-10-CM

## 2025-03-06 DIAGNOSIS — M85.80 OSTEOPENIA, UNSPECIFIED LOCATION: ICD-10-CM

## 2025-03-06 DIAGNOSIS — E78.01 FAMILIAL HYPERCHOLESTEROLEMIA: Chronic | ICD-10-CM

## 2025-03-06 DIAGNOSIS — N95.9 POST MENOPAUSAL PROBLEMS: ICD-10-CM

## 2025-03-06 DIAGNOSIS — E11.22 TYPE 2 DIABETES MELLITUS WITH CHRONIC KIDNEY DISEASE, WITH LONG-TERM CURRENT USE OF INSULIN, UNSPECIFIED CKD STAGE: ICD-10-CM

## 2025-03-06 DIAGNOSIS — F41.1 GENERALIZED ANXIETY DISORDER: ICD-10-CM

## 2025-03-06 DIAGNOSIS — H93.13 TINNITUS OF BOTH EARS: ICD-10-CM

## 2025-03-06 DIAGNOSIS — Z79.4 TYPE 2 DIABETES MELLITUS WITH CHRONIC KIDNEY DISEASE, WITH LONG-TERM CURRENT USE OF INSULIN, UNSPECIFIED CKD STAGE: Primary | ICD-10-CM

## 2025-03-06 DIAGNOSIS — Z79.4 TYPE 2 DIABETES MELLITUS WITH CHRONIC KIDNEY DISEASE, WITH LONG-TERM CURRENT USE OF INSULIN, UNSPECIFIED CKD STAGE: ICD-10-CM

## 2025-03-06 DIAGNOSIS — R94.31 ABNORMAL ELECTROCARDIOGRAM: ICD-10-CM

## 2025-03-06 DIAGNOSIS — G89.4 CHRONIC PAIN DISORDER: ICD-10-CM

## 2025-03-06 DIAGNOSIS — I13.0 HYPERTENSIVE HEART AND CHRONIC KIDNEY DISEASE WITH HEART FAILURE AND STAGE 1 THROUGH STAGE 4 CHRONIC KIDNEY DISEASE, OR UNSPECIFIED CHRONIC KIDNEY DISEASE: ICD-10-CM

## 2025-03-06 DIAGNOSIS — L21.9 SEBORRHEIC DERMATITIS: ICD-10-CM

## 2025-03-06 DIAGNOSIS — H90.A22 SENSORINEURAL HEARING LOSS (SNHL) OF LEFT EAR WITH RESTRICTED HEARING OF RIGHT EAR: ICD-10-CM

## 2025-03-06 DIAGNOSIS — E55.9 VITAMIN D DEFICIENCY: ICD-10-CM

## 2025-03-06 DIAGNOSIS — M81.0 AGE-RELATED OSTEOPOROSIS WITHOUT CURRENT PATHOLOGICAL FRACTURE: ICD-10-CM

## 2025-03-06 DIAGNOSIS — I10 ESSENTIAL HYPERTENSION: Chronic | ICD-10-CM

## 2025-03-06 DIAGNOSIS — J42 CHRONIC BRONCHITIS, UNSPECIFIED CHRONIC BRONCHITIS TYPE: ICD-10-CM

## 2025-03-06 DIAGNOSIS — I69.354 HEMIPLEGIA AND HEMIPARESIS FOLLOWING CEREBRAL INFARCTION AFFECTING LEFT NON-DOMINANT SIDE: ICD-10-CM

## 2025-03-06 DIAGNOSIS — N18.4 STAGE 4 CHRONIC KIDNEY DISEASE: ICD-10-CM

## 2025-03-06 DIAGNOSIS — F32.A DEPRESSION, UNSPECIFIED DEPRESSION TYPE: Chronic | ICD-10-CM

## 2025-03-06 DIAGNOSIS — F33.40 RECURRENT MAJOR DEPRESSIVE DISORDER, IN REMISSION: ICD-10-CM

## 2025-03-06 DIAGNOSIS — G89.4 CHRONIC PAIN SYNDROME: ICD-10-CM

## 2025-03-06 DIAGNOSIS — N18.32 STAGE 3B CHRONIC KIDNEY DISEASE: ICD-10-CM

## 2025-03-06 DIAGNOSIS — H90.A22 SENSORINEURAL HEARING LOSS (SNHL) OF LEFT EAR WITH RESTRICTED HEARING OF RIGHT EAR: Primary | ICD-10-CM

## 2025-03-06 DIAGNOSIS — Z86.73 HISTORY OF CVA (CEREBROVASCULAR ACCIDENT): ICD-10-CM

## 2025-03-06 DIAGNOSIS — E66.01 CLASS 2 SEVERE OBESITY DUE TO EXCESS CALORIES WITH SERIOUS COMORBIDITY AND BODY MASS INDEX (BMI) OF 37.0 TO 37.9 IN ADULT: ICD-10-CM

## 2025-03-06 DIAGNOSIS — D69.2 OTHER NONTHROMBOCYTOPENIC PURPURA: ICD-10-CM

## 2025-03-06 DIAGNOSIS — E66.812 CLASS 2 SEVERE OBESITY DUE TO EXCESS CALORIES WITH SERIOUS COMORBIDITY AND BODY MASS INDEX (BMI) OF 37.0 TO 37.9 IN ADULT: ICD-10-CM

## 2025-03-06 DIAGNOSIS — I25.10 ATHSCL HEART DISEASE OF NATIVE CORONARY ARTERY W/O ANG PCTRS: ICD-10-CM

## 2025-03-06 DIAGNOSIS — E16.2 HYPOGLYCEMIA: ICD-10-CM

## 2025-03-06 DIAGNOSIS — I25.118 CORONARY ARTERY DISEASE OF NATIVE ARTERY OF NATIVE HEART WITH STABLE ANGINA PECTORIS: ICD-10-CM

## 2025-03-06 DIAGNOSIS — E08.42 DIABETIC POLYNEUROPATHY ASSOCIATED WITH DIABETES MELLITUS DUE TO UNDERLYING CONDITION: ICD-10-CM

## 2025-03-06 DIAGNOSIS — E66.812 CLASS 2 OBESITY WITH BODY MASS INDEX (BMI) OF 39.0 TO 39.9 IN ADULT, UNSPECIFIED OBESITY TYPE, UNSPECIFIED WHETHER SERIOUS COMORBIDITY PRESENT: ICD-10-CM

## 2025-03-06 DIAGNOSIS — I70.0 AORTIC ATHEROSCLEROSIS: ICD-10-CM

## 2025-03-06 LAB
ALBUMIN SERPL BCP-MCNC: 4 G/DL (ref 3.5–5.2)
ALP SERPL-CCNC: 63 U/L (ref 40–150)
ALT SERPL W/O P-5'-P-CCNC: 14 U/L (ref 10–44)
ANION GAP SERPL CALC-SCNC: 11 MMOL/L (ref 8–16)
AST SERPL-CCNC: 21 U/L (ref 10–40)
BILIRUB SERPL-MCNC: 0.2 MG/DL (ref 0.1–1)
BUN SERPL-MCNC: 26 MG/DL (ref 8–23)
CALCIUM SERPL-MCNC: 9.5 MG/DL (ref 8.7–10.5)
CHLORIDE SERPL-SCNC: 101 MMOL/L (ref 95–110)
CHOLEST SERPL-MCNC: 190 MG/DL (ref 120–199)
CHOLEST/HDLC SERPL: 4.9 {RATIO} (ref 2–5)
CO2 SERPL-SCNC: 23 MMOL/L (ref 23–29)
CREAT SERPL-MCNC: 1.3 MG/DL (ref 0.5–1.4)
ERYTHROCYTE [DISTWIDTH] IN BLOOD BY AUTOMATED COUNT: 16.3 % (ref 11.5–14.5)
EST. GFR  (NO RACE VARIABLE): 42 ML/MIN/1.73 M^2
ESTIMATED AVG GLUCOSE: 180 MG/DL (ref 68–131)
GLUCOSE SERPL-MCNC: 268 MG/DL (ref 70–110)
GLUCOSE SERPL-MCNC: 314 MG/DL (ref 70–110)
HBA1C MFR BLD: 7.9 % (ref 4–5.6)
HCT VFR BLD AUTO: 32.4 % (ref 37–48.5)
HDLC SERPL-MCNC: 39 MG/DL (ref 40–75)
HDLC SERPL: 20.5 % (ref 20–50)
HGB BLD-MCNC: 10.4 G/DL (ref 12–16)
LDLC SERPL CALC-MCNC: 97.4 MG/DL (ref 63–159)
MAGNESIUM SERPL-MCNC: 2 MG/DL (ref 1.6–2.6)
MCH RBC QN AUTO: 28 PG (ref 27–31)
MCHC RBC AUTO-ENTMCNC: 32.1 G/DL (ref 32–36)
MCV RBC AUTO: 87 FL (ref 82–98)
NONHDLC SERPL-MCNC: 151 MG/DL
PHOSPHATE SERPL-MCNC: 3.8 MG/DL (ref 2.7–4.5)
PLATELET # BLD AUTO: 378 K/UL (ref 150–450)
PMV BLD AUTO: 11.8 FL (ref 9.2–12.9)
POTASSIUM SERPL-SCNC: 5 MMOL/L (ref 3.5–5.1)
PROT SERPL-MCNC: 8.2 G/DL (ref 6–8.4)
RBC # BLD AUTO: 3.72 M/UL (ref 4–5.4)
SODIUM SERPL-SCNC: 135 MMOL/L (ref 136–145)
TRIGL SERPL-MCNC: 268 MG/DL (ref 30–150)
WBC # BLD AUTO: 12.31 K/UL (ref 3.9–12.7)

## 2025-03-06 PROCEDURE — 85027 COMPLETE CBC AUTOMATED: CPT | Performed by: STUDENT IN AN ORGANIZED HEALTH CARE EDUCATION/TRAINING PROGRAM

## 2025-03-06 PROCEDURE — 92567 TYMPANOMETRY: CPT | Mod: S$GLB,,, | Performed by: AUDIOLOGIST

## 2025-03-06 PROCEDURE — 82962 GLUCOSE BLOOD TEST: CPT | Mod: PBBFAC | Performed by: STUDENT IN AN ORGANIZED HEALTH CARE EDUCATION/TRAINING PROGRAM

## 2025-03-06 PROCEDURE — 82306 VITAMIN D 25 HYDROXY: CPT | Performed by: STUDENT IN AN ORGANIZED HEALTH CARE EDUCATION/TRAINING PROGRAM

## 2025-03-06 PROCEDURE — 99999 PR PBB SHADOW E&M-EST. PATIENT-LVL V: CPT | Mod: PBBFAC,,, | Performed by: STUDENT IN AN ORGANIZED HEALTH CARE EDUCATION/TRAINING PROGRAM

## 2025-03-06 PROCEDURE — 80053 COMPREHEN METABOLIC PANEL: CPT | Performed by: STUDENT IN AN ORGANIZED HEALTH CARE EDUCATION/TRAINING PROGRAM

## 2025-03-06 PROCEDURE — 99999PBSHW POCT GLUCOSE, HAND-HELD DEVICE: Mod: PBBFAC,,,

## 2025-03-06 PROCEDURE — 99215 OFFICE O/P EST HI 40 MIN: CPT | Mod: PBBFAC | Performed by: STUDENT IN AN ORGANIZED HEALTH CARE EDUCATION/TRAINING PROGRAM

## 2025-03-06 PROCEDURE — 92557 COMPREHENSIVE HEARING TEST: CPT | Mod: S$GLB,,, | Performed by: AUDIOLOGIST

## 2025-03-06 PROCEDURE — 80061 LIPID PANEL: CPT | Performed by: STUDENT IN AN ORGANIZED HEALTH CARE EDUCATION/TRAINING PROGRAM

## 2025-03-06 PROCEDURE — 83735 ASSAY OF MAGNESIUM: CPT | Performed by: STUDENT IN AN ORGANIZED HEALTH CARE EDUCATION/TRAINING PROGRAM

## 2025-03-06 PROCEDURE — 84100 ASSAY OF PHOSPHORUS: CPT | Performed by: STUDENT IN AN ORGANIZED HEALTH CARE EDUCATION/TRAINING PROGRAM

## 2025-03-06 PROCEDURE — 83036 HEMOGLOBIN GLYCOSYLATED A1C: CPT | Performed by: STUDENT IN AN ORGANIZED HEALTH CARE EDUCATION/TRAINING PROGRAM

## 2025-03-06 RX ORDER — METHOCARBAMOL 500 MG/1
500 TABLET, FILM COATED ORAL 3 TIMES DAILY PRN
Qty: 90 TABLET | Refills: 2 | Status: SHIPPED | OUTPATIENT
Start: 2025-03-06

## 2025-03-06 NOTE — PROGRESS NOTES
Ear, Nose, & Throat  Otolaryngology - Head & Neck Surgery      Subjective:     Chief Complaint: Hearing Loss    Indira Waters is a 79 y.o. female with a history of CVA and left-sided hemiparesis who was referred to me by Jax Gibbs in consultation for chronic, progressive hearing loss. She first noticed a change in his hearing several years ago.    She feels that her ears are impacted with wax    Associated symptoms include non-pulsatile tinnitus. She denies any vertigo, aural fullness, otalgia, or otorrhea.     Otologic history is significant for none    She does not wear hearing aids but is interested.     She is not using ototoxic medications.     Risk factors for hearing loss include MVO CVA.      Past Medical History  Active Ambulatory Problems     Diagnosis Date Noted    Abnormal electrocardiogram 04/29/2014    Abnormal presence of protein in urine 06/09/2014    Gastroesophageal reflux disease without esophagitis 04/29/2014    Anxiety disorder 04/29/2014    CAFL (chronic airflow limitation) 04/29/2014    Nephrolithiasis 04/29/2014    Chronic pain syndrome 05/22/2014    Clinical depression 04/29/2014    Familial hypercholesterolemia 04/29/2014    Arthritis or polyarthritis, rheumatoid 04/29/2014    Age-related osteoporosis without current pathological fracture 05/08/2014    Diabetic polyneuropathy 03/12/2019    History of renal calculi 01/04/2021    History of cervical cancer 01/11/2018    Mucopurulent chronic bronchitis 01/04/2021    Radiculopathy of thoracolumbar region 03/21/2021    Neuropathic pain 05/05/2021    Former smoker 06/11/2021    Coronary artery calcification 08/13/2021    Essential hypertension 09/10/2021    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 pectoris 05/04/2022    Noncompliance 06/20/2022    Recurrent major depressive disorder, in remission 06/20/2022    Adverse effect of bisphosphonate 09/30/2022    Class 2 severe obesity due to excess  calories with serious comorbidity in adult 10/07/2022    Hemoptysis 11/22/2022    Dysphagia 11/22/2022    Aortic atherosclerosis 02/03/2023    Stage 3b chronic kidney disease 05/11/2023    Vitamin D deficiency 09/29/2023    IgA mediated leukocytoclastic vasculitis 10/26/2023    Athscl heart disease of native coronary artery w/o ang pctrs 09/15/2021    Depression, unspecified 11/10/2021    Acute on chronic combined systolic and diastolic congestive heart failure 09/15/2021    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 disease 09/15/2021    Advanced care planning/counseling discussion 02/27/2024    Stage 4 chronic kidney disease 02/27/2024    Primary osteoarthritis of right knee 04/18/2024    Hemiplegia and hemiparesis following cerebral infarction affecting left non-dominant side 09/15/2021    Generalized anxiety disorder 09/15/2021    Stage 3 chronic kidney disease 09/15/2021    Class 2 obesity with body mass index (BMI) of 39.0 to 39.9 in adult 06/21/2024    Tortuous aorta 07/10/2024    Senile purpura 07/10/2024    Chronic bronchitis 07/19/2024    COPD (chronic obstructive pulmonary disease); SUSPECTED 07/19/2024    History of CVA (cerebrovascular accident) 07/19/2024    Edema 10/30/2024    Dysfunction of the multifidus muscle of lumbar region 02/04/2025    Painful diabetic neuropathy 02/04/2025    Primary osteoarthritis of both knees 02/04/2025    Hypertensive heart and chronic kidney disease with heart failure and stage 1 through stage 4 chronic kidney disease, or unspecified chronic kidney disease 03/06/2025     Resolved Ambulatory Problems     Diagnosis Date Noted    Hypertension associated with stage 3 chronic kidney disease due to type 2 diabetes mellitus 04/29/2014    Cerebral seizure 04/29/2014    Degeneration of intervertebral disc of cervical region 05/08/2014    Degeneration of intervertebral disc of lumbar region 05/08/2014    Heart failure, diastolic, chronic 05/09/2014    Fibromyalgia  04/29/2014    Headache, migraine 04/29/2014    Inflammatory autoimmune disorder 04/29/2014    Lung mass 05/23/2014    Peptic ulcer 04/29/2014    Lumbar radiculopathy 01/04/2021    Lupus erythematosus 01/11/2018    Nicotine dependence 01/11/2018    Class 2 severe obesity due to excess calories with serious comorbidity and body mass index (BMI) of 36.0 to 36.9 in adult 06/11/2021    Chronic kidney disease, stage III (moderate) 06/11/2021    Chest pain with high risk for cardiac etiology 06/11/2021    HLD (hyperlipidemia) 06/11/2021    Chronic pain 08/25/2021    Diabetic ketoacidosis without coma associated with type 2 diabetes mellitus 09/05/2021    Sepsis 09/06/2021    UTI (urinary tract infection) 09/06/2021    Left lower lobe pneumonia 09/06/2021    Lactic acid acidosis     Hypovolemia     Encounter for palliative care 09/07/2021    Diarrhea 09/11/2021    Encephalopathy, metabolic 10/01/2021    Pneumonia 10/01/2021    Volume overload 10/01/2021    Acute on chronic diastolic heart failure 10/01/2021    Leucocytoclastic vasculitis 10/07/2021    RANJEET (acute kidney injury) 05/04/2022    Acute hypoxemic respiratory failure 05/05/2022    Delirium 05/05/2022    Noncompliance with medication regimen 07/13/2022    Carcinoma of uterine cervix, invasive 10/26/2023    Systemic lupus erythematosus, unspecified 09/15/2021    BRBPR (bright red blood per rectum) 01/31/2024    Acute respiratory failure with hypoxia and hypercapnia 06/21/2024    Rheumatoid arthritis, unspecified 11/10/2021    Diabetic ketoacidosis without coma associated with type 2 diabetes mellitus 06/21/2024    Acute on chronic respiratory failure with hypoxia 07/19/2024     Past Medical History:   Diagnosis Date    Arthritis     Back pain     Cancer     Cervical cancer     Coronary artery disease     Depression     Diabetes mellitus     Heart attack     History of MI (myocardial infarction)     Hyperlipidemia     Hypertension     Lupus     Stroke        Past  Surgical History  She has a past surgical history that includes Hysterectomy; Tonsillectomy; Appendectomy;  section; Injection of anesthetic agent around nerve (Bilateral, 2021); Injection of anesthetic agent around nerve (N/A, 2021); pr eval,swallow function,cine/video record (2021); Coronary angiography (N/A, 2022); and Trial of spinal cord nerve stimulator (N/A, 3/8/2023).    Past Surgical History:   Procedure Laterality Date    APPENDECTOMY       SECTION      2    CORONARY ANGIOGRAPHY N/A 2022    Procedure: ANGIOGRAM, CORONARY ARTERY;  Surgeon: Jose L Méndez MD;  Location: [facility] CATH LAB;  Service: Cardiology;  Laterality: N/A;    HYSTERECTOMY      with USO for cervical cancer    INJECTION OF ANESTHETIC AGENT AROUND NERVE Bilateral 2021    Procedure: BLOCK, NERVE, SYMPATHIC;  Surgeon: Holden Pereira MD;  Location: [facility] PAIN MGT;  Service: Pain Management;  Laterality: Bilateral;    INJECTION OF ANESTHETIC AGENT AROUND NERVE N/A 2021    Procedure: BLOCK, NERVE, SYMPATHETIC  need consent;  Surgeon: Holden Pereira MD;  Location: [facility] PAIN MGT;  Service: Pain Management;  Laterality: N/A;    NC EVAL,SWALLOW FUNCTION,CINE/VIDEO RECORD  2021         TONSILLECTOMY      TRIAL OF SPINAL CORD NERVE STIMULATOR N/A 3/8/2023    Procedure: LUMBAR SPINAL CORD STIMULATOR TRIAL NEVRO REP PATIENT STATES SHE NO LONGER TAKES PLAVIX;  Surgeon: Holden Pereira MD;  Location: [facility] PAIN MGT;  Service: Pain Management;  Laterality: N/A;        Family History  Her family history includes COPD in her mother; Colon cancer in her maternal grandmother; Coronary artery disease in her father; Diabetes in her father; Hernia in her mother; Lupus in her mother; Ovarian cancer in her mother; Uterine cancer in her mother.    Social History  She reports that she quit smoking about 4 years ago. Her smoking use included cigarettes. She has been exposed to tobacco smoke. She  "has never used smokeless tobacco. She reports that she does not currently use alcohol. She reports current drug use. Drug: Hydrocodone.    Allergies  She is allergic to bleach (sodium hypochlorite), nitrofurantoin macrocrystalline, lipitor [atorvastatin], nsaids (non-steroidal anti-inflammatory drug), pcn [penicillins], statins-hmg-coa reductase inhibitors, and toradol [ketorolac].    Medications  She has a current medication list which includes the following prescription(s): acetaminophen, albuterol, allopurinol, aspirin, bd ultra-fine micro pen needle, blood sugar diagnostic, blood-glucose meter, repatha sureclick, fenofibrate micronized, hydrocodone-acetaminophen, lantus solostar u-100 insulin, insulin lispro, combivent respimat, lancets, losartan, melatonin, methocarbamol, metoclopramide hcl, metoprolol succinate, miconazole nitrate 2%, nifedipine, nitroglycerin, pregabalin, senna-docusate 8.6-50 mg, sertraline, torsemide, methocarbamol, and [DISCONTINUED] dexcom g6 transmitter.    ROS:  Pertinent positive and negative review of systems as noted in HPI.      Objective:     BP (!) 157/66 (BP Location: Right arm, Patient Position: Sitting)   Pulse 70   Ht 5' 1" (1.549 m)   Wt 91 kg (200 lb 9.9 oz)   BMI 37.91 kg/m²    Constitutional: Well appearing, communicating. No acute distress  Voice: Euphonic  Eyes: Conjunctiva WNL, Pupils reactive  Head/Face: House Brackmann I Bilaterally.  Right Ear:    Auricle normally developed   EAC: normal and cerumen-filled   Tympanic membrane: intact   Middle Ear: No effusion present and Ossicles in normal position  Left Ear:    Auricle normally developed   EAC: normal and cerumen-filled   Tympanic membrane: intact   Middle Ear: No effusion present and Ossicles in normal position  Vestibular:    Spontaneous Nystagmus: none  Neuro/Psychiatric:     Affect: Appropriate   Eyes: EOMI intact  Respiratory: No increased WOB, no stridor       Data Review:   LABS    I have independently " reviewed the lab results shown above. Findings significant for the conditions being treated include: none    IMAGING    No pertinent imaging available    AUDIO    I have independently reviewed the following audiogram and reviewed the report. Findings as follows:     Pure Tone Audiometry: Asymmetric  Right - Mild (26-40 dB) to Moderate-severe (56-70 dB) high frequency sensorineural hearing loss   Left - Mild (26-40 dB) to Severe (71-90 dB) high frequency sensorineural hearing loss     Tympanometry  Right: Type A  Left: Type A    Word Discrimination Score  Right: 92%  Left 100%      Procedures:   Procedure: Cerumen removal 10146     Pre procedure Diagnosis: bilateral Cerumen Impaction     Post procedure Diagnosis: same     Instrument: Binocular Microscope     Anesthesia: None     Procedure: After verbal consent was obtained, the patient was laid supine in the small procedure room. A microscope was used to examine the ear. Instrumentation and suction were used to remove any cerumen from the EAC. If indicated, the procedure was repeated on the contralateral side. The patient tolerated the procedure well and without any acute complication. Findings below.      Findings:               Right Ear:               EAC: Normal, Cerumen filled              Tympanic membrane: Intact              Middle Ear: No effusion present and Ossicles in normal position  Left Ear:               EAC: Normal Cerumen filled              Tympanic membrane: Intact              Middle Ear: No effusion present and Ossicles in normal position     The cerumen was removed completely from both ears.         Assessment:     1. Bilateral impacted cerumen    2. Sensorineural hearing loss (SNHL) of left ear with restricted hearing of right ear    3. Tinnitus of both ears        Plan:     I had a long discussion with this patient regarding her condition and management strategies. The patient has asymmetric SNHL which is worse on the left as defined by  asymmetry of greater than 10 dB gap across 3 continuous frequencies. I discussed with her that my recommendation would be to undergo an MRI; however, we also discussed that MRI is negative in >90% of cases.  We discussed that her asymmetric hearing loss is most likely secondary to her stroke.  She understood and does not want to undergo an MRI at this time. We also discussed management of his hearing loss. She will return to clinic in 1 year with a repeat audiogram.     We discussed her tinnitus. I explained this phenomenon as a phantom sound often produced in response to hearing damage, although other causes exist. I explained the use of masking devices. A white noise machine or music (particularly classical, jazz or chanting) can provide a soothing and effective alternative sound. We discussed that both hearing aid use and masking techniques may alleviate some of her tinnitus, though neither are expected to eliminate it. I encouraged her to return to clinic should her tinnitus ever become bothersome (e.g. tinnitus-related depression, anxiety or sleep disturbance).     Voice recognition software was used in the creation of this note/communication and any sound-alike errors which may have occurred from its use should be taken in context when interpreting. If such errors prevent a clear understanding of the note/communication, please contact the office for clarification.   Problem List Items Addressed This Visit    None  Visit Diagnoses         Bilateral impacted cerumen    -  Primary      Sensorineural hearing loss (SNHL) of left ear with restricted hearing of right ear          Tinnitus of both ears

## 2025-03-06 NOTE — PROGRESS NOTES
"  [facility]    Subjective:       Patient ID: Indira Waters is a 79 y.o. female.    Chief Complaint: Follow-up, Back Pain, and Leg Pain      History was obtained from the patient and supplemented through chart review.  This patient is known to me.    HPI:    Patient is a 79 y.o. female w/   Past Medical History:   Diagnosis Date    Arthritis     Back pain     Cancer     ovarian    Cervical cancer     Coronary artery disease     Depression     Diabetes mellitus     Fibromyalgia     Heart attack     History of MI (myocardial infarction)     Hyperlipidemia     Hypertension     Lupus     Stroke     slight left sided weakness     Presents with daughter w/ pmhx including DM, HTN, HLD, CAD, CHF, fibromyalgia presents forregular follow-up.  Had been seen late July for f/u with recent admission for SOB thought to be 2/2 copd exacerbation    Doing well    DM  Improved A1C  Lantus 26 in the AM, 26 in the PM (Giuliana Montero NP)  Humalog 15+ break, lunch, 11+ dinner  Reviewed glucoses, check A1C  Some lows with arm CGM, has lancet to double check in office today, discrepancy: reading 48, but glucose on our POCT was >300    CAD\CHF HFpEF  Followed by Dr. Méndez  Cardiac Cath 5/6/2022 with significant blockages   Taking ASA 81, plavix  Not taking statin due to vasculitis thought to be 2/2 statin; on repatha   Torsemide taking    Chronic bronchitis, suspected COPD  Stable, cont current plan    Normocytic Anemia  Family history of colon cancer in grandmother  Declines colonoscopy, will let me know if she wants to proceed    Fibromyalgia   Chronic Pain Syndrome worse, better with neuropathic pain "patches"     Osteoporosis  Did not tolerate bisphosphonate in the past  forteo with endocrinology   Awaiting calcium and Vit D    HTN  Procardia 30 BID, losartan 50 mg daily?, toprol 100 mg BID  Cont    CHF   Grade II DD  Lasix 40 mg daily  Doing well    Hx of Drug induced Vasculitis  Thought to be 2/2 to atorvastatin, " now on repatha q 14 days    CKD Stage 3a  Last seen nephro Dec 2022, needs to f/u  Avoid nsaids, nephrotoxic meds    Depression  zoloft 100 mg daily   Stable and doing well    History of cervical cancer at about age 31, never a problem later in life  S/p hyst with 1 ovary remaining  Saw urogyn 2023    Wt Readings from Last 15 Encounters:   25 91 kg (200 lb 9.9 oz)   25 91.1 kg (200 lb 13.4 oz)   24 91.1 kg (200 lb 13.4 oz)   10/31/24 91.6 kg (201 lb 15.1 oz)   10/30/24 91.2 kg (201 lb)   24 93.7 kg (206 lb 9.1 oz)   24 93.7 kg (206 lb 9.1 oz)   24 92.1 kg (203 lb 0.7 oz)   24 89.7 kg (197 lb 12 oz)   24 87.1 kg (192 lb 0.3 oz)   07/10/24 89.6 kg (197 lb 8.5 oz)   24 91.2 kg (201 lb 1 oz)   24 89.8 kg (198 lb)   05/15/24 89.9 kg (198 lb 3.2 oz)   05/10/24 84 kg (185 lb 3 oz)         Son and   on mothers' day    Lupus? Pt reported in the past  STEVE normal ,   Never treated, not confirmed in our system  STEVE normal ,   C3 and C4 normal         History of Present Illness    CHIEF COMPLAINT:  Ms. Waters presents today for psoriasis medication    DERMATOLOGIC:  She reports dryness and itchiness in her eyelid and ears. Her last dermatology visit was in 2021 for a rash.    DIABETES:  Her morning glucose readings range between 123-189 mg/dL, with occasional higher readings of 203 mg/dL and 248 mg/dL. Her current glucose meter shows potentially erroneous low readings, with one instance showing 58 mg/dL that was actually 178 mg/dL upon manual recheck. Her last diabetes education visit was in summer of last year.    CARDIOVASCULAR:  She reports occasional chest pain requiring nitroglycerin for relief. Her last cardiology visit with Dr. Saldivar was in May with follow-up scheduled for .    RESPIRATORY:  She reports current symptoms of a bad cold with labored breathing, rapid respirations, and a hoarse voice.    OCULAR:  Her  last eye exam was in January 2023. She reports difficulty obtaining new glasses.      ROS:  Cardiovascular: +chest pain  Respiratory: +shortness of breath  Skin: +dry skin             Lab Results   Component Value Date    HGBA1C 7.5 (H) 12/03/2024    HGBA1C 7.5 (H) 09/24/2024    HGBA1C 8.2 (H) 07/25/2024     Lab Results   Component Value Date    LDLCALC 119.8 07/25/2024    CREATININE 1.2 12/03/2024         Wt Readings from Last 15 Encounters:   03/06/25 91 kg (200 lb 9.9 oz)   03/06/25 91.1 kg (200 lb 13.4 oz)   12/27/24 91.1 kg (200 lb 13.4 oz)   10/31/24 91.6 kg (201 lb 15.1 oz)   10/30/24 91.2 kg (201 lb)   09/19/24 93.7 kg (206 lb 9.1 oz)   09/03/24 93.7 kg (206 lb 9.1 oz)   08/23/24 92.1 kg (203 lb 0.7 oz)   07/25/24 89.7 kg (197 lb 12 oz)   07/19/24 87.1 kg (192 lb 0.3 oz)   07/10/24 89.6 kg (197 lb 8.5 oz)   06/26/24 91.2 kg (201 lb 1 oz)   05/22/24 89.8 kg (198 lb)   05/15/24 89.9 kg (198 lb 3.2 oz)   05/10/24 84 kg (185 lb 3 oz)       Medical History  Past Medical History:   Diagnosis Date    Arthritis     Back pain     Cancer     ovarian    Cervical cancer     Coronary artery disease     Depression     Diabetes mellitus     Fibromyalgia     Heart attack     History of MI (myocardial infarction)     Hyperlipidemia     Hypertension     Lupus     Stroke     slight left sided weakness       Review of Systems   Constitutional:  Negative for fever.   HENT:  Negative for trouble swallowing.    Respiratory:  Positive for shortness of breath.    Cardiovascular:  Positive for chest pain.   Gastrointestinal:  Negative for constipation, diarrhea, nausea and vomiting.   Musculoskeletal:  Negative for gait problem.   Skin:         Dry skin   Neurological:  Negative for dizziness and seizures.   Psychiatric/Behavioral:  Negative for hallucinations.          Surgical hx, family hx, social hx   Have been reviewed    Current Medications[1]    Objective:        Body mass index is 37.95 kg/m².  Vitals:    03/06/25 1308  "03/06/25 1407   BP: (!) 142/76 (!) 150/78   Pulse: 73    Resp: 18    Temp: 97.7 °F (36.5 °C)    TempSrc: Oral    SpO2: 98%    Weight: 91.1 kg (200 lb 13.4 oz)    Height: 5' 1" (1.549 m)    PainSc:   9    PainLoc: Back      Physical Exam  Vitals and nursing note reviewed.   Constitutional:       General: She is not in acute distress.     Appearance: Normal appearance. She is not ill-appearing.      Comments: wheelchair   HENT:      Head: Normocephalic and atraumatic.   Eyes:      General: No scleral icterus.  Cardiovascular:      Rate and Rhythm: Normal rate and regular rhythm.      Heart sounds: Normal heart sounds.   Pulmonary:      Effort: Pulmonary effort is normal.   Musculoskeletal:         General: No deformity.      Cervical back: Normal range of motion.   Skin:     General: Skin is warm and dry.   Neurological:      Mental Status: She is alert and oriented to person, place, and time.   Psychiatric:         Behavior: Behavior normal.               Lab Results   Component Value Date    WBC 12.63 12/03/2024    HGB 9.3 (L) 12/03/2024    HCT 29.9 (L) 12/03/2024     12/03/2024    CHOL 236 (H) 07/25/2024    TRIG 381 (H) 07/25/2024    HDL 40 07/25/2024    ALT 14 12/03/2024    AST 14 12/03/2024     (L) 12/03/2024    K 4.2 12/03/2024     12/03/2024    CREATININE 1.2 12/03/2024    BUN 21 12/03/2024    CO2 20 (L) 12/03/2024    TSH 2.059 06/05/2024    INR 1.0 06/21/2024    HGBA1C 7.5 (H) 12/03/2024       The ASCVD Risk score (Annamarie WEATHERS, et al., 2019) failed to calculate for the following reasons:    Risk score cannot be calculated because patient has a medical history suggesting prior/existing ASCVD    (Imaging have been independently reviewed)  Dexa      Assessment:         1. Type 2 diabetes mellitus with chronic kidney disease, with long-term current use of insulin, unspecified CKD stage    2. Class 2 obesity with body mass index (BMI) of 39.0 to 39.9 in adult, unspecified obesity type, unspecified " whether serious comorbidity present    3. Stage 3b chronic kidney disease    4. Essential hypertension    5. Seborrheic dermatitis    6. Abnormal electrocardiogram    7. Osteopenia, unspecified location    8. Post menopausal problems    9. Generalized anxiety disorder    10. Depression, unspecified depression type    11. Hemiplegia and hemiparesis following cerebral infarction affecting left non-dominant side    12. 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 pectoris    13. Age-related osteoporosis without current pathological fracture    14. Hypoglycemia    15. Vitamin D deficiency    16. Chronic pain syndrome    17. Recurrent major depressive disorder, in remission    18. Athscl heart disease of native coronary artery w/o ang pctrs    19. Aortic atherosclerosis    20. Acute on chronic combined systolic and diastolic congestive heart failure    21. Familial hypercholesterolemia    22. Hypertensive heart and chronic kidney disease with heart failure and stage 1 through stage 4 chronic kidney disease, or unspecified chronic kidney disease    23. Class 2 severe obesity due to excess calories with serious comorbidity and body mass index (BMI) of 37.0 to 37.9 in adult    24. Stage 4 chronic kidney disease    25. Other nonthrombocytopenic purpura    26. Chronic bronchitis, unspecified chronic bronchitis type    27. History of CVA (cerebrovascular accident)          Plan:     Indira was seen today for follow-up, back pain and leg pain.    Diagnoses and all orders for this visit:    Type 2 diabetes mellitus with chronic kidney disease, with long-term current use of insulin, unspecified CKD stage  -     Hemoglobin A1C; Future  -     Lipid Panel; Future  -     Ambulatory referral/consult to Optometry; Future    Class 2 obesity with body mass index (BMI) of 39.0 to 39.9 in adult, unspecified obesity type, unspecified whether serious comorbidity present    Stage 3b chronic kidney disease  -      CBC Without Differential; Future  -     Comprehensive Metabolic Panel; Future    Essential hypertension    Seborrheic dermatitis  -     miconazole nitrate 2% (MICOTIN) 2 % Oint; Apply topically 2 (two) times daily. For 10 days    Abnormal electrocardiogram    Osteopenia, unspecified location  -     Magnesium; Future  -     PHOSPHORUS; Future  -     Vitamin D; Future    Post menopausal problems  -     Magnesium; Future  -     PHOSPHORUS; Future  -     Vitamin D; Future    Generalized anxiety disorder    Depression, unspecified depression type    Hemiplegia and hemiparesis following cerebral infarction affecting left non-dominant side    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 pectoris    Age-related osteoporosis without current pathological fracture    Hypoglycemia  -     POCT Glucose, Hand-Held Device    Vitamin D deficiency    Chronic pain syndrome    Recurrent major depressive disorder, in remission    Athscl heart disease of native coronary artery w/o ang pctrs    Aortic atherosclerosis    Acute on chronic combined systolic and diastolic congestive heart failure    Familial hypercholesterolemia    Hypertensive heart and chronic kidney disease with heart failure and stage 1 through stage 4 chronic kidney disease, or unspecified chronic kidney disease    Class 2 severe obesity due to excess calories with serious comorbidity and body mass index (BMI) of 37.0 to 37.9 in adult    Stage 4 chronic kidney disease    Other nonthrombocytopenic purpura    Chronic bronchitis, unspecified chronic bronchitis type    History of CVA (cerebrovascular accident)        Assessment & Plan    IMPRESSION:  1. Assessed skin condition, likely seborrheic dermatitis rather than psoriasis, affecting eyelids and ears  2. Evaluated diabetes management, noting generally improved glucose control with some outlier readings  3. Reviewed cardiac care, confirming upcoming cardiology appointment  4. Noted wheezes  on lung exam, correlating with patient's report of breathing difficulties  5. Monitored blood pressure, planning for follow-up if elevated    HYPERTENSIVE HEART AND CHRONIC KIDNEY DISEASE WITH HEART FAILURE AND STAGE 1 THROUGH STAGE 4 CHRONIC KIDNEY DISEASE, OR UNSPECIFIED CHRONIC KIDNEY DISEASE:   Auscultated heart, found regular rhythm.   Ordered blood pressure recheck and scheduled 3-month follow-up for reassessment.    CHRONIC BRONCHITIS:   Ms. Waters reports shortness of breath and rapid breathing.   Lung exam revealed a few wheezes.    STAGE 4 CHRONIC KIDNEY DISEASE:   Planned to check labs.    SEBORRHEIC DERMATITIS:   Evaluated the condition, suggesting it might be seborrheic dermatitis rather than psoriasis.   Reviewed previous medication and prescribed miconazole cream for eyelids and ears.   Ms. Waters reports dryness and itchiness in these areas.   Instructed to contact the office if condition persists after using prescribed cream.    CHEST PAIN:   Ms. Waters reports occasional chest pain requiring nitroglycerin.   Reviewed and scheduled follow-up visit with cardiologist.    DIABETES:   Morning glucose levels range between 123 and 189, with occasional outliers and some erroneous low readings.   Reviewed glucose readings, noting improvement.   Ordered finger stick glucose test in office.   Referred patient back to diabetes education with Elena Villatoro, reviewing previous appointments.    RESPIRATORY INFECTION:   Ms. Waters reports having a bad cold and talking rough.   Explained difference between immune system response to flu vaccine and actual flu infection.   Clarified relationship between viral infections and development of pneumonia.    FLU VACCINATION:   Recommend RSV and flu vaccines based on patient's history of severe flu complications.    OPHTHALMOLOGY REFERRAL:   Ms. Waters reports needing new glasses but being unable to get them.   Reviewed eye care history and referred patient to ophthalmology  "for eye exam and potential new glasses.    LABS:   No content (redundant with Note A)    FOLLOW UP:   Consider virtual visit if still available, pending potential changes in government regulations.             Tests to Keep You Healthy    Eye Exam: SCHEDULED      Follow up in about 3 months (around 6/6/2025). or sooner prn            This note was generated with the assistance of ambient listening technology. Verbal consent was obtained by the patient and accompanying visitor(s) for the recording of patient appointment to facilitate this note. I attest to having reviewed and edited the generated note for accuracy, though some syntax or spelling errors may persist. Please contact the author of this note for any clarification.      Visit today is associated with current or anticipated ongoing medical care related to this patient's single serious condition/complex condition of dm, htn, cad. The patient will return to see me as these issues will be followed longitudinally.    All medications were reviewed including potential side effects and risks/benefits.  Pt was counseled to call back if anything worsens or if questions arise.        Chun Zapata MD  Family Medicine  [facility]  [address] [place] [address]  Phone [phone]  Fax [phone]         [1]   Current Outpatient Medications:     albuterol (ACCUNEB) 1.25 mg/3 mL Nebu, Take 3 mLs (1.25 mg total) by nebulization every 6 (six) hours as needed (for wheezing or shortness of breath). Rescue, Disp: 75 mL, Rfl: 11    allopurinoL (ZYLOPRIM) 300 MG tablet, Take 1 tablet (300 mg total) by mouth once daily., Disp: 90 tablet, Rfl: 3    aspirin (ECOTRIN) 81 MG EC tablet, Take 1 tablet (81 mg total) by mouth once daily., Disp: 30 tablet, Rfl: 0    BD ULTRA-FINE MICRO PEN NEEDLE 32 gauge x 1/4" Ndle, Use with insulin 5 times a day., Disp: 400 each, Rfl: 0    blood sugar diagnostic Strp, To check BG 6 times daily, to use with " insurance preferred meter, Disp: 200 each, Rfl: 11    blood-glucose meter kit, To check BG 6 times daily, to use with insurance preferred meter, Disp: 1 each, Rfl: 0    evolocumab (REPATHA SURECLICK) 140 mg/mL PnIj, Inject 1 mL (140 mg total) into the skin every 14 (fourteen) days., Disp: 2 each, Rfl: 11    fenofibrate micronized (LOFIBRA) 134 MG Cap, Take 1 capsule (134 mg total) by mouth daily with breakfast., Disp: 90 capsule, Rfl: 3    HYDROcodone-acetaminophen (NORCO)  mg per tablet, Take 1 tablet by mouth every 8 (eight) hours as needed for Pain., Disp: 90 tablet, Rfl: 0    insulin glargine U-100, Lantus, (LANTUS SOLOSTAR U-100 INSULIN) 100 unit/mL (3 mL) InPn pen, Inject 26 Units into the skin 2 (two) times a day., Disp: 60 mL, Rfl: 3    insulin lispro (HUMALOG KWIKPEN INSULIN) 100 unit/mL pen, Inject 15 Units into the skin before breakfast AND 15 Units daily with lunch AND 11 Units daily with dinner or evening meal. Plus correction scale. Max TDD 50units.., Disp: 40 mL, Rfl: 3    ipratropium-albuteroL (COMBIVENT RESPIMAT)  mcg/actuation inhaler, Inhale 1 puff into the lungs every 6 (six) hours., Disp: 12 g, Rfl: 3    lancets (TRUEPLUS LANCETS) 30 gauge Misc, Check blood sugar 4 times daily, Disp: 400 each, Rfl: 2    losartan (COZAAR) 50 MG tablet, Take 1 tablet (50 mg total) by mouth once daily., Disp: 90 tablet, Rfl: 3    melatonin (MELATIN) 3 mg tablet, Take 2 tablets (6 mg total) by mouth nightly as needed for Insomnia., Disp: , Rfl:     methocarbamoL (ROBAXIN) 500 MG Tab, Take 500 mg by mouth 4 (four) times daily., Disp: , Rfl:     metoclopramide HCl (REGLAN) 5 MG tablet, TAKE ONE TABLET BY MOUTH FOUR TIMES DAILY AS NEEDED FOR nausea prevention, Disp: 90 tablet, Rfl: 3    metoprolol succinate (TOPROL-XL) 100 MG 24 hr tablet, Take 1 tablet (100 mg total) by mouth once daily., Disp: 90 tablet, Rfl: 3    NIFEdipine (PROCARDIA-XL) 30 MG (OSM) 24 hr tablet, Take 1 tablet (30 mg total) by mouth 2  (two) times a day., Disp: 180 tablet, Rfl: 3    nitroGLYCERIN (NITROSTAT) 0.4 MG SL tablet, DISSOLVE 1 TABLET UNDER THE TONGUE EVERY 5 MINUTES AS NEEDED FOR CHEST PAIN. DO NOT EXCEED A TOTAL OF 3 DOSES IN 15 MINUTES., Disp: 25 tablet, Rfl: 11    pregabalin (LYRICA) 150 MG capsule, Take 1 capsule (150 mg total) by mouth 2 (two) times daily., Disp: 60 capsule, Rfl: 5    senna-docusate 8.6-50 mg (PERICOLACE) 8.6-50 mg per tablet, Take 1 tablet by mouth 2 (two) times daily as needed for Constipation., Disp: 60 tablet, Rfl: 0    sertraline (ZOLOFT) 100 MG tablet, TAKE ONE TABLET BY MOUTH EVERY DAY, Disp: 90 tablet, Rfl: 3    torsemide (DEMADEX) 20 MG Tab, Take 1 tablet (20 mg total) by mouth once daily., Disp: 90 tablet, Rfl: 3    acetaminophen (TYLENOL) 500 MG tablet, Take 2 tablets (1,000 mg total) by mouth every 8 (eight) hours as needed for Pain., Disp: , Rfl: 0    methocarbamoL (ROBAXIN) 500 MG Tab, Take 1 tablet (500 mg total) by mouth 3 (three) times daily as needed (muscle pain)., Disp: 90 tablet, Rfl: 2    miconazole nitrate 2% (MICOTIN) 2 % Oint, Apply topically 2 (two) times daily. For 10 days, Disp: 142 g, Rfl: 3

## 2025-03-06 NOTE — PROGRESS NOTES
Indira Waters, a 79 y.o. female, was seen today in the clinic for an audiologic evaluation.  Patient reported difficulty hearing. Pateint had bilateral cerumen impaction that was cleaned by Dr. Vuong prior to today's testing. Patient reported tinnitus subsided after cerumen management.    Tympanometry revealed Type A in the right ear and Type A in the left ear.     Audiogram results revealed normal sloping to moderate sensorineural hearing loss in the right ear and normal sloping to severe sensorineural hearing loss in the left ear.  Speech reception thresholds were noted at 20 dB in the right ear and 25 dB in the left ear.  Speech discrimination scores were 92% in the right ear and 100% in the left ear.    Recommendations:  Otologic evaluation  Annual audiogram  Hearing protection when in noise  Hearing aid consultation

## 2025-03-07 LAB — 25(OH)D3+25(OH)D2 SERPL-MCNC: 54 NG/ML (ref 30–96)

## 2025-03-09 ENCOUNTER — RESULTS FOLLOW-UP (OUTPATIENT)
Dept: INTERNAL MEDICINE | Facility: CLINIC | Age: 80
End: 2025-03-09

## 2025-03-10 DIAGNOSIS — I10 ESSENTIAL HYPERTENSION: Chronic | ICD-10-CM

## 2025-03-10 NOTE — TELEPHONE ENCOUNTER
No care due was identified.  [facility] Embedded Care Due Messages. Reference number: [id number].   3/10/2025 9:34:17 AM CDT

## 2025-03-11 DIAGNOSIS — J44.9 CHRONIC OBSTRUCTIVE PULMONARY DISEASE, UNSPECIFIED COPD TYPE: ICD-10-CM

## 2025-03-11 DIAGNOSIS — E11.65 TYPE 2 DIABETES MELLITUS WITH HYPERGLYCEMIA, WITH LONG-TERM CURRENT USE OF INSULIN: ICD-10-CM

## 2025-03-11 DIAGNOSIS — Z79.4 TYPE 2 DIABETES MELLITUS WITH HYPERGLYCEMIA, WITH LONG-TERM CURRENT USE OF INSULIN: ICD-10-CM

## 2025-03-11 DIAGNOSIS — J41.1 MUCOPURULENT CHRONIC BRONCHITIS: ICD-10-CM

## 2025-03-11 RX ORDER — NIFEDIPINE 30 MG/1
30 TABLET, EXTENDED RELEASE ORAL 2 TIMES DAILY
Qty: 180 TABLET | Refills: 3 | Status: SHIPPED | OUTPATIENT
Start: 2025-03-11

## 2025-03-11 NOTE — TELEPHONE ENCOUNTER
Refill Routing Note   Medication(s) are not appropriate for processing by [facility] for the following reason(s):        Required vitals abnormal    [facility] action(s):  Defer               Appointments  past 12m or future 3m with PCP    Date Provider   Last Visit   3/6/2025 Chun Zapata MD   Next Visit   Visit date not found Chun Zapata MD   ED visits in past 90 days: 0        Note composed:12:19 AM 03/11/2025

## 2025-03-12 RX ORDER — ALBUTEROL SULFATE 1.25 MG/3ML
1.25 SOLUTION RESPIRATORY (INHALATION) EVERY 6 HOURS PRN
Qty: 300 ML | Refills: 11 | Status: SHIPPED | OUTPATIENT
Start: 2025-03-12

## 2025-03-12 RX ORDER — PEN NEEDLE, DIABETIC 32 GX 1/4"
NEEDLE, DISPOSABLE MISCELLANEOUS
Qty: 400 EACH | Refills: 10 | Status: SHIPPED | OUTPATIENT
Start: 2025-03-12

## 2025-03-12 NOTE — TELEPHONE ENCOUNTER
Refill Decision Note   Indira Waters  is requesting a refill authorization.  Brief Assessment and Rationale for Refill:  Approve     Medication Therapy Plan:         Comments:     Note composed:10:54 AM 03/12/2025

## 2025-03-12 NOTE — TELEPHONE ENCOUNTER
No care due was identified.  [facility] Embedded Care Due Messages. Reference number: [id number].   3/11/2025 7:24:42 PM CDT

## 2025-03-16 DIAGNOSIS — E11.40 PAINFUL DIABETIC NEUROPATHY: ICD-10-CM

## 2025-03-16 DIAGNOSIS — G89.4 CHRONIC PAIN SYNDROME: ICD-10-CM

## 2025-03-16 DIAGNOSIS — I10 ESSENTIAL HYPERTENSION: Chronic | ICD-10-CM

## 2025-03-16 DIAGNOSIS — N18.4 STAGE 4 CHRONIC KIDNEY DISEASE: ICD-10-CM

## 2025-03-16 DIAGNOSIS — M17.0 PRIMARY OSTEOARTHRITIS OF BOTH KNEES: ICD-10-CM

## 2025-03-16 RX ORDER — PREGABALIN 150 MG/1
150 CAPSULE ORAL 2 TIMES DAILY
Qty: 60 CAPSULE | Refills: 5 | Status: CANCELLED | OUTPATIENT
Start: 2025-03-16 | End: 2025-09-12

## 2025-03-16 NOTE — TELEPHONE ENCOUNTER
No care due was identified.  [facility] Embedded Care Due Messages. Reference number: [id number].   3/16/2025 5:52:14 PM CDT

## 2025-03-17 RX ORDER — LOSARTAN POTASSIUM 50 MG/1
50 TABLET ORAL DAILY
Qty: 90 TABLET | Refills: 3 | Status: SHIPPED | OUTPATIENT
Start: 2025-03-17

## 2025-03-17 NOTE — TELEPHONE ENCOUNTER
Staff spoke with patient in regards to her refill request for Lyrica. Patient provider sent her in 5 refills to her pharmacy, staff verbalized to the patients daughter yasmine may need to check the pharmacy for refills.

## 2025-03-17 NOTE — TELEPHONE ENCOUNTER
Refill Routing Note   Medication(s) are not appropriate for processing by [facility] for the following reason(s):        Required vitals abnormal    [facility] action(s):  Defer               Appointments  past 12m or future 3m with PCP    Date Provider   Last Visit   3/6/2025 Chun Zapata MD   Next Visit   Visit date not found Chun Zapata MD   ED visits in past 90 days: 0        Note composed:2:23 PM 03/17/2025

## 2025-03-18 ENCOUNTER — PATIENT MESSAGE (OUTPATIENT)
Dept: ENDOCRINOLOGY | Facility: CLINIC | Age: 80
End: 2025-03-18
Payer: MEDICARE

## 2025-03-26 DIAGNOSIS — E11.40 PAINFUL DIABETIC NEUROPATHY: ICD-10-CM

## 2025-03-26 DIAGNOSIS — M54.16 LUMBAR RADICULOPATHY, CHRONIC: ICD-10-CM

## 2025-03-26 DIAGNOSIS — M17.0 PRIMARY OSTEOARTHRITIS OF BOTH KNEES: ICD-10-CM

## 2025-03-26 DIAGNOSIS — G89.4 CHRONIC PAIN DISORDER: ICD-10-CM

## 2025-03-27 ENCOUNTER — OFFICE VISIT (OUTPATIENT)
Dept: CARDIOLOGY | Facility: CLINIC | Age: 80
End: 2025-03-27
Payer: MEDICARE

## 2025-03-27 ENCOUNTER — CLINICAL SUPPORT (OUTPATIENT)
Dept: DIABETES | Facility: CLINIC | Age: 80
End: 2025-03-27
Payer: MEDICARE

## 2025-03-27 VITALS
OXYGEN SATURATION: 96 % | BODY MASS INDEX: 37.41 KG/M2 | WEIGHT: 198 LBS | HEART RATE: 77 BPM | SYSTOLIC BLOOD PRESSURE: 144 MMHG | DIASTOLIC BLOOD PRESSURE: 76 MMHG

## 2025-03-27 DIAGNOSIS — E11.22 TYPE 2 DIABETES MELLITUS WITH CHRONIC KIDNEY DISEASE, WITH LONG-TERM CURRENT USE OF INSULIN, UNSPECIFIED CKD STAGE: ICD-10-CM

## 2025-03-27 DIAGNOSIS — Z79.4 TYPE 2 DIABETES MELLITUS WITH CHRONIC KIDNEY DISEASE, WITH LONG-TERM CURRENT USE OF INSULIN, UNSPECIFIED CKD STAGE: ICD-10-CM

## 2025-03-27 DIAGNOSIS — I10 PRIMARY HYPERTENSION: ICD-10-CM

## 2025-03-27 DIAGNOSIS — E78.2 MIXED HYPERLIPIDEMIA: ICD-10-CM

## 2025-03-27 DIAGNOSIS — I50.42 CHRONIC COMBINED SYSTOLIC AND DIASTOLIC HEART FAILURE: Primary | ICD-10-CM

## 2025-03-27 DIAGNOSIS — I25.118 ATHEROSCLEROSIS OF NATIVE CORONARY ARTERY OF NATIVE HEART WITH STABLE ANGINA PECTORIS: ICD-10-CM

## 2025-03-27 PROCEDURE — G0108 DIAB MANAGE TRN  PER INDIV: HCPCS | Mod: PBBFAC | Performed by: DIETITIAN, REGISTERED

## 2025-03-27 PROCEDURE — 99999 PR PBB SHADOW E&M-EST. PATIENT-LVL III: CPT | Mod: PBBFAC,,, | Performed by: INTERNAL MEDICINE

## 2025-03-27 PROCEDURE — 99999 PR PBB SHADOW E&M-EST. PATIENT-LVL I: CPT | Mod: PBBFAC,,, | Performed by: DIETITIAN, REGISTERED

## 2025-03-27 PROCEDURE — 93005 ELECTROCARDIOGRAM TRACING: CPT

## 2025-03-27 PROCEDURE — 99999PBSHW PR PBB SHADOW TECHNICAL ONLY FILED TO HB: Mod: PBBFAC,,,

## 2025-03-27 PROCEDURE — 99213 OFFICE O/P EST LOW 20 MIN: CPT | Mod: PBBFAC | Performed by: INTERNAL MEDICINE

## 2025-03-27 PROCEDURE — 99211 OFF/OP EST MAY X REQ PHY/QHP: CPT | Mod: PBBFAC,25,27 | Performed by: DIETITIAN, REGISTERED

## 2025-03-27 RX ORDER — HYDROCODONE BITARTRATE AND ACETAMINOPHEN 10; 325 MG/1; MG/1
1 TABLET ORAL EVERY 8 HOURS PRN
Qty: 90 TABLET | Refills: 0 | Status: SHIPPED | OUTPATIENT
Start: 2025-03-27 | End: 2025-04-26

## 2025-03-27 NOTE — PROGRESS NOTES
[facility], [place] CARDIOLOGY    Chief Complaint  Chief Complaint   Patient presents with    Follow-up       HPI:    She has been doing well.  No complaints.  Angina is stable.  Takes nitroglycerin less than once per week.    Medications  Current Medications[1]     History  Past Medical History:   Diagnosis Date    Arthritis     Back pain     Cancer     ovarian    Cervical cancer     Coronary artery disease     Depression     Diabetes mellitus     Fibromyalgia     Heart attack     History of MI (myocardial infarction)     Hyperlipidemia     Hypertension     Lupus     Stroke     slight left sided weakness     Past Surgical History:   Procedure Laterality Date    APPENDECTOMY       SECTION      2    CORONARY ANGIOGRAPHY N/A 2022    Procedure: ANGIOGRAM, CORONARY ARTERY;  Surgeon: Jose L Méndez MD;  Location: [facility] CATH LAB;  Service: Cardiology;  Laterality: N/A;    HYSTERECTOMY      with USO for cervical cancer    INJECTION OF ANESTHETIC AGENT AROUND NERVE Bilateral 2021    Procedure: BLOCK, NERVE, SYMPATHIC;  Surgeon: Holden Pereira MD;  Location: [facility] PAIN MGT;  Service: Pain Management;  Laterality: Bilateral;    INJECTION OF ANESTHETIC AGENT AROUND NERVE N/A 2021    Procedure: BLOCK, NERVE, SYMPATHETIC  need consent;  Surgeon: Holden Pereira MD;  Location: [facility] PAIN MGT;  Service: Pain Management;  Laterality: N/A;    VA EVAL,SWALLOW FUNCTION,CINE/VIDEO RECORD  2021         TONSILLECTOMY      TRIAL OF SPINAL CORD NERVE STIMULATOR N/A 3/8/2023    Procedure: LUMBAR SPINAL CORD STIMULATOR TRIAL NEVRO REP PATIENT STATES SHE NO LONGER TAKES PLAVIX;  Surgeon: Holden Pereira MD;  Location: [facility] PAIN MGT;  Service: Pain Management;  Laterality: N/A;     Social History[2]  Family History   Problem Relation Name Age of Onset    COPD Mother      Lupus Mother      Hernia Mother      Uterine cancer Mother          vs cervical cancer    Ovarian cancer Mother      Diabetes Father       Coronary artery disease Father      Colon cancer Maternal Grandmother          in her 50's        Allergies  Review of patient's allergies indicates:   Allergen Reactions    Bleach (sodium hypochlorite) Shortness Of Breath    Nitrofurantoin macrocrystalline Anaphylaxis    Lipitor [atorvastatin] Diarrhea and Rash    Nsaids (non-steroidal anti-inflammatory drug)      Tolerates aspirin      Pcn [penicillins]     Statins-hmg-coa reductase inhibitors     Toradol [ketorolac]        Review of Systems   Review of Systems   Constitutional: Negative for chills, fever and malaise/fatigue.   HENT:  Negative for nosebleeds.    Eyes:  Negative for blurred vision, double vision, vision loss in left eye and vision loss in right eye.   Cardiovascular:  Positive for chest pain and dyspnea on exertion. Negative for claudication, irregular heartbeat, leg swelling, near-syncope, orthopnea, palpitations, paroxysmal nocturnal dyspnea and syncope.   Respiratory:  Negative for cough, hemoptysis, shortness of breath and wheezing.    Endocrine: Negative for cold intolerance and heat intolerance.   Hematologic/Lymphatic: Negative for bleeding problem. Does not bruise/bleed easily.   Skin:  Negative for color change.   Musculoskeletal:  Negative for falls, muscle weakness and myalgias.   Gastrointestinal:  Negative for abdominal pain, heartburn, hematemesis, hematochezia, hemorrhoids, jaundice, melena, nausea and vomiting.   Genitourinary:  Negative for dysuria, hematuria and nocturia.   Neurological:  Negative for dizziness, focal weakness, headaches, light-headedness, loss of balance, numbness, vertigo and weakness.   Psychiatric/Behavioral:  Negative for altered mental status, depression and memory loss. The patient is not nervous/anxious.    Allergic/Immunologic: Negative for hives and persistent infections.       Physical Exam  Vitals:    03/27/25 1549   BP: (!) 144/76   Pulse: 77     Wt Readings from Last 1 Encounters:   03/27/25 89.8 kg  (198 lb)     Physical Exam  Vitals and nursing note reviewed.   Constitutional:       General: She is not in acute distress.     Appearance: She is obese. She is not toxic-appearing or diaphoretic.   HENT:      Head: Normocephalic and atraumatic.      Mouth/Throat:      Lips: Pink.      Mouth: Mucous membranes are moist.   Eyes:      General: No scleral icterus.     Conjunctiva/sclera: Conjunctivae normal.   Neck:      Thyroid: No thyromegaly.      Vascular: No carotid bruit, hepatojugular reflux or JVD.      Trachea: Trachea normal.   Cardiovascular:      Rate and Rhythm: Normal rate and regular rhythm. No extrasystoles are present.     Chest Wall: PMI is not displaced.      Pulses:           Carotid pulses are 2+ on the right side and 2+ on the left side.       Radial pulses are 2+ on the right side and 2+ on the left side.      Heart sounds: S1 normal and S2 normal. No murmur heard.     No friction rub. No S3 or S4 sounds.   Pulmonary:      Effort: Pulmonary effort is normal. No accessory muscle usage or respiratory distress.      Breath sounds: Normal breath sounds and air entry. No decreased breath sounds, wheezing, rhonchi or rales.   Abdominal:      General: Bowel sounds are normal. There is no distension or abdominal bruit.      Palpations: Abdomen is soft. There is no hepatomegaly, splenomegaly or pulsatile mass.      Tenderness: There is no abdominal tenderness.   Musculoskeletal:         General: No tenderness or deformity.      Right lower leg: No edema.      Left lower leg: No edema.   Skin:     General: Skin is warm and dry.      Capillary Refill: Capillary refill takes less than 2 seconds.      Coloration: Skin is not cyanotic or pale.      Nails: There is no clubbing.   Neurological:      General: No focal deficit present.      Mental Status: She is alert and oriented to person, place, and time.   Psychiatric:         Attention and Perception: Attention normal.         Mood and Affect: Mood normal.          Speech: Speech normal.         Behavior: Behavior normal. Behavior is cooperative.         Labs  Lab Visit on 03/06/2025   Component Date Value Ref Range Status    WBC 03/06/2025 12.31  3.90 - 12.70 K/uL Final    RBC 03/06/2025 3.72 (L)  4.00 - 5.40 M/uL Final    Hemoglobin 03/06/2025 10.4 (L)  12.0 - 16.0 g/dL Final    Hematocrit 03/06/2025 32.4 (L)  37.0 - 48.5 % Final    MCV 03/06/2025 87  82 - 98 fL Final    MCH 03/06/2025 28.0  27.0 - 31.0 pg Final    MCHC 03/06/2025 32.1  32.0 - 36.0 g/dL Final    RDW 03/06/2025 16.3 (H)  11.5 - 14.5 % Final    Platelets 03/06/2025 378  150 - 450 K/uL Final    MPV 03/06/2025 11.8  9.2 - 12.9 fL Final    Sodium 03/06/2025 135 (L)  136 - 145 mmol/L Final    Potassium 03/06/2025 5.0  3.5 - 5.1 mmol/L Final    Chloride 03/06/2025 101  95 - 110 mmol/L Final    CO2 03/06/2025 23  23 - 29 mmol/L Final    Glucose 03/06/2025 268 (H)  70 - 110 mg/dL Final    BUN 03/06/2025 26 (H)  8 - 23 mg/dL Final    Creatinine 03/06/2025 1.3  0.5 - 1.4 mg/dL Final    Calcium 03/06/2025 9.5  8.7 - 10.5 mg/dL Final    Total Protein 03/06/2025 8.2  6.0 - 8.4 g/dL Final    Albumin 03/06/2025 4.0  3.5 - 5.2 g/dL Final    Total Bilirubin 03/06/2025 0.2  0.1 - 1.0 mg/dL Final    Comment: For infants and newborns, interpretation of results should be based  on gestational age, weight and in agreement with clinical  observations.    Premature Infant recommended reference ranges:  Up to 24 hours.............<8.0 mg/dL  Up to 48 hours............<12.0 mg/dL  3-5 days..................<15.0 mg/dL  6-29 days.................<15.0 mg/dL      Alkaline Phosphatase 03/06/2025 63  40 - 150 U/L Final    AST 03/06/2025 21  10 - 40 U/L Final    ALT 03/06/2025 14  10 - 44 U/L Final    eGFR 03/06/2025 42 (A)  >60 mL/min/1.73 m^2 Final    Anion Gap 03/06/2025 11  8 - 16 mmol/L Final    Hemoglobin A1C 03/06/2025 7.9 (H)  4.0 - 5.6 % Final    Comment: ADA Screening Guidelines:  5.7-6.4%  Consistent with  prediabetes  >or=6.5%  Consistent with diabetes    High levels of fetal hemoglobin interfere with the HbA1C  assay. Heterozygous hemoglobin variants (HbS, HgC, etc)do  not significantly interfere with this assay.   However, presence of multiple variants may affect accuracy.      Estimated Avg Glucose 03/06/2025 180 (H)  68 - 131 mg/dL Final    Cholesterol 03/06/2025 190  120 - 199 mg/dL Final    Comment: The National Cholesterol Education Program (NCEP) has set the  following guidelines (reference ranges) for Cholesterol:  Optimal.....................<200 mg/dL  Borderline High.............200-239 mg/dL  High........................> or = 240 mg/dL      Triglycerides 03/06/2025 268 (H)  30 - 150 mg/dL Final    Comment: The National Cholesterol Education Program (NCEP) has set the  following guidelines (reference values) for triglycerides:  Normal......................<150 mg/dL  Borderline High.............150-199 mg/dL  High........................200-499 mg/dL      HDL 03/06/2025 39 (L)  40 - 75 mg/dL Final    Comment: The National Cholesterol Education Program (NCEP) has set the  following guidelines (reference values) for HDL Cholesterol:  Low...............<40 mg/dL  Optimal...........>60 mg/dL      LDL Cholesterol 03/06/2025 97.4  63.0 - 159.0 mg/dL Final    Comment: The National Cholesterol Education Program (NCEP) has set the  following guidelines (reference values) for LDL Cholesterol:  Optimal.......................<130 mg/dL  Borderline High...............130-159 mg/dL  High..........................160-189 mg/dL  Very High.....................>190 mg/dL      HDL/Cholesterol Ratio 03/06/2025 20.5  20.0 - 50.0 % Final    Total Cholesterol/HDL Ratio 03/06/2025 4.9  2.0 - 5.0 Final    Non-HDL Cholesterol 03/06/2025 151  mg/dL Final    Comment: Risk category and Non-HDL cholesterol goals:  Coronary heart disease (CHD)or equivalent (10-year risk of CHD >20%):  Non-HDL cholesterol goal     <130 mg/dL  Two or  more CHD risk factors and 10-year risk of CHD <= 20%:  Non-HDL cholesterol goal     <160 mg/dL  0 to 1 CHD risk factor:  Non-HDL cholesterol goal     <190 mg/dL      Magnesium 03/06/2025 2.0  1.6 - 2.6 mg/dL Final    Phosphorus 03/06/2025 3.8  2.7 - 4.5 mg/dL Final    Vit D, 25-Hydroxy 03/06/2025 54  30 - 96 ng/mL Final    Comment: Vitamin D deficiency.........<10 ng/mL                              Vitamin D insufficiency......10-29 ng/mL       Vitamin D sufficiency........> or equal to 30 ng/mL  Vitamin D toxicity............>100 ng/mL     Office Visit on 03/06/2025   Component Date Value Ref Range Status    POC Glucose 03/06/2025 314 (A)  70 - 110 MG/DL Final   Lab Visit on 12/03/2024   Component Date Value Ref Range Status    Hemoglobin A1C 12/03/2024 7.5 (H)  4.0 - 5.6 % Final    Comment: ADA Screening Guidelines:  5.7-6.4%  Consistent with prediabetes  >or=6.5%  Consistent with diabetes    High levels of fetal hemoglobin interfere with the HbA1C  assay. Heterozygous hemoglobin variants (HbS, HgC, etc)do  not significantly interfere with this assay.   However, presence of multiple variants may affect accuracy.      Estimated Avg Glucose 12/03/2024 169 (H)  68 - 131 mg/dL Final    WBC 12/03/2024 12.63  3.90 - 12.70 K/uL Final    RBC 12/03/2024 3.26 (L)  4.00 - 5.40 M/uL Final    Hemoglobin 12/03/2024 9.3 (L)  12.0 - 16.0 g/dL Final    Hematocrit 12/03/2024 29.9 (L)  37.0 - 48.5 % Final    MCV 12/03/2024 92  82 - 98 fL Final    MCH 12/03/2024 28.5  27.0 - 31.0 pg Final    MCHC 12/03/2024 31.1 (L)  32.0 - 36.0 g/dL Final    RDW 12/03/2024 15.7 (H)  11.5 - 14.5 % Final    Platelets 12/03/2024 306  150 - 450 K/uL Final    MPV 12/03/2024 11.2  9.2 - 12.9 fL Final    Sodium 12/03/2024 135 (L)  136 - 145 mmol/L Final    Potassium 12/03/2024 4.2  3.5 - 5.1 mmol/L Final    Chloride 12/03/2024 103  95 - 110 mmol/L Final    CO2 12/03/2024 20 (L)  23 - 29 mmol/L Final    Glucose 12/03/2024 163 (H)  70 - 110 mg/dL Final     BUN 12/03/2024 21  8 - 23 mg/dL Final    Creatinine 12/03/2024 1.2  0.5 - 1.4 mg/dL Final    Calcium 12/03/2024 9.2  8.7 - 10.5 mg/dL Final    Total Protein 12/03/2024 6.7  6.0 - 8.4 g/dL Final    Albumin 12/03/2024 3.6  3.5 - 5.2 g/dL Final    Total Bilirubin 12/03/2024 0.2  0.1 - 1.0 mg/dL Final    Comment: For infants and newborns, interpretation of results should be based  on gestational age, weight and in agreement with clinical  observations.    Premature Infant recommended reference ranges:  Up to 24 hours.............<8.0 mg/dL  Up to 48 hours............<12.0 mg/dL  3-5 days..................<15.0 mg/dL  6-29 days.................<15.0 mg/dL      Alkaline Phosphatase 12/03/2024 61  40 - 150 U/L Final    AST 12/03/2024 14  10 - 40 U/L Final    ALT 12/03/2024 14  10 - 44 U/L Final    eGFR 12/03/2024 46 (A)  >60 mL/min/1.73 m^2 Final    Anion Gap 12/03/2024 12  8 - 16 mmol/L Final    Microalbumin, Urine 12/03/2024 7.0  ug/mL Final    Creatinine, Urine 12/03/2024 17.2  15.0 - 325.0 mg/dL Final    Microalb/Creat Ratio 12/03/2024 40.7 (H)  0.0 - 30.0 ug/mg Final   Lab Visit on 10/30/2024   Component Date Value Ref Range Status    Sodium 10/30/2024 139  136 - 145 mmol/L Final    Potassium 10/30/2024 4.0  3.5 - 5.1 mmol/L Final    Chloride 10/30/2024 98  95 - 110 mmol/L Final    CO2 10/30/2024 28  23 - 29 mmol/L Final    Glucose 10/30/2024 124 (H)  70 - 110 mg/dL Final    BUN 10/30/2024 29 (H)  8 - 23 mg/dL Final    Creatinine 10/30/2024 1.2  0.5 - 1.4 mg/dL Final    Calcium 10/30/2024 10.2  8.7 - 10.5 mg/dL Final    Anion Gap 10/30/2024 13  8 - 16 mmol/L Final    eGFR 10/30/2024 46.3 (A)  >60 mL/min/1.73 m^2 Final       Imaging  No results found.    Assessment  1. Chronic combined systolic and diastolic heart failure   Compensated    2. Atherosclerosis of native coronary artery of native heart with stable angina pectoris  No change in her angina pattern  - EKG 12-lead    3. Primary hypertension  Controlled    4.  "Mixed hyperlipidemia  Controlled      Plan and Discussion    No change to present management    The ASCVD Risk score (Annamarie DK, et al., 2019) failed to calculate for the following reasons:    Risk score cannot be calculated because patient has a medical history suggesting prior/existing ASCVD     Follow Up  Follow up in about 6 months (around 9/27/2025).      Jose L Méndez MD         [1]   Current Outpatient Medications   Medication Sig Dispense Refill    acetaminophen (TYLENOL) 500 MG tablet Take 2 tablets (1,000 mg total) by mouth every 8 (eight) hours as needed for Pain.  0    albuterol (ACCUNEB) 1.25 mg/3 mL Nebu Take 3 mLs (1.25 mg total) by nebulization every 6 (six) hours as needed (for wheezing or shortness of breath). Rescue 300 mL 11    allopurinoL (ZYLOPRIM) 300 MG tablet Take 1 tablet (300 mg total) by mouth once daily. 90 tablet 3    aspirin (ECOTRIN) 81 MG EC tablet Take 1 tablet (81 mg total) by mouth once daily. 30 tablet 0    BD ULTRA-FINE MICRO PEN NEEDLE 32 gauge x 1/4" Ndle Use with insulin 5 times a day. 400 each 10    blood sugar diagnostic Strp To check BG 6 times daily, to use with insurance preferred meter 200 each 11    blood-glucose meter kit To check BG 6 times daily, to use with insurance preferred meter 1 each 0    evolocumab (REPATHA SURECLICK) 140 mg/mL PnIj Inject 1 mL (140 mg total) into the skin every 14 (fourteen) days. 2 each 11    fenofibrate micronized (LOFIBRA) 134 MG Cap Take 1 capsule (134 mg total) by mouth daily with breakfast. 90 capsule 3    HYDROcodone-acetaminophen (NORCO)  mg per tablet Take 1 tablet by mouth every 8 (eight) hours as needed for Pain. 90 tablet 0    insulin glargine U-100, Lantus, (LANTUS SOLOSTAR U-100 INSULIN) 100 unit/mL (3 mL) InPn pen Inject 26 Units into the skin 2 (two) times a day. 60 mL 3    insulin lispro (HUMALOG KWIKPEN INSULIN) 100 unit/mL pen Inject 15 Units into the skin before breakfast AND 15 Units daily with lunch AND 11 " Units daily with dinner or evening meal. Plus correction scale. Max TDD 50units.. 40 mL 3    ipratropium-albuteroL (COMBIVENT RESPIMAT)  mcg/actuation inhaler Inhale 1 puff into the lungs every 6 (six) hours. 12 g 3    lancets (TRUEPLUS LANCETS) 30 gauge Misc Check blood sugar 4 times daily 400 each 2    losartan (COZAAR) 50 MG tablet Take 1 tablet (50 mg total) by mouth once daily. 90 tablet 3    melatonin (MELATIN) 3 mg tablet Take 2 tablets (6 mg total) by mouth nightly as needed for Insomnia.      methocarbamoL (ROBAXIN) 500 MG Tab Take 500 mg by mouth 4 (four) times daily.      methocarbamoL (ROBAXIN) 500 MG Tab Take 1 tablet (500 mg total) by mouth 3 (three) times daily as needed (muscle pain). 90 tablet 2    metoclopramide HCl (REGLAN) 5 MG tablet TAKE ONE TABLET BY MOUTH FOUR TIMES DAILY AS NEEDED FOR nausea prevention 90 tablet 3    metoprolol succinate (TOPROL-XL) 100 MG 24 hr tablet Take 1 tablet (100 mg total) by mouth once daily. 90 tablet 3    miconazole nitrate 2% (MICOTIN) 2 % Oint Apply topically 2 (two) times daily. For 10 days 142 g 3    NIFEdipine (PROCARDIA-XL) 30 MG (OSM) 24 hr tablet TAKE ONE TABLET BY MOUTH TWICE DAILY 180 tablet 3    nitroGLYCERIN (NITROSTAT) 0.4 MG SL tablet DISSOLVE 1 TABLET UNDER THE TONGUE EVERY 5 MINUTES AS NEEDED FOR CHEST PAIN. DO NOT EXCEED A TOTAL OF 3 DOSES IN 15 MINUTES. 25 tablet 11    pregabalin (LYRICA) 150 MG capsule Take 1 capsule (150 mg total) by mouth 2 (two) times daily. 60 capsule 5    senna-docusate 8.6-50 mg (PERICOLACE) 8.6-50 mg per tablet Take 1 tablet by mouth 2 (two) times daily as needed for Constipation. 60 tablet 0    sertraline (ZOLOFT) 100 MG tablet TAKE ONE TABLET BY MOUTH EVERY DAY 90 tablet 3    torsemide (DEMADEX) 20 MG Tab Take 1 tablet (20 mg total) by mouth once daily. 90 tablet 3     No current facility-administered medications for this visit.   [2]   Social History  Socioeconomic History    Marital status:    Tobacco Use     Smoking status: Former     Current packs/day: 0.00     Types: Cigarettes     Quit date: 2020     Years since quittin.4     Passive exposure: Past    Smokeless tobacco: Never   Substance and Sexual Activity    Alcohol use: Not Currently     Comment: occasionally    Drug use: Yes     Types: Hydrocodone     Comment: three times a day    Sexual activity: Not Currently   Social History Narrative    Lives with daughter. .      Social Drivers of Health     Financial Resource Strain: Low Risk  (2024)    Overall Financial Resource Strain (CARDIA)     Difficulty of Paying Living Expenses: Not hard at all   Food Insecurity: No Food Insecurity (2024)    Hunger Vital Sign     Worried About Running Out of Food in the Last Year: Never true     Ran Out of Food in the Last Year: Never true   Transportation Needs: No Transportation Needs (2024)    TRANSPORTATION NEEDS     Transportation : No   Physical Activity: Inactive (2024)    Exercise Vital Sign     Days of Exercise per Week: 0 days     Minutes of Exercise per Session: 0 min   Stress: No Stress Concern Present (2024)    [employer] - Occupational Stress Questionnaire     Feeling of Stress : Not at all   Housing Stability: Unknown (2024)    Housing Stability Vital Sign     Unable to Pay for Housing in the Last Year: No     Homeless in the Last Year: No

## 2025-03-27 NOTE — TELEPHONE ENCOUNTER
Patient requesting refill on Norco  Last office visit 1-   shows last refill on 02-28.25   Patient does have a pain contract on file with [facility] department  Patient last UDS 9- was consistent with current therapy

## 2025-03-28 ENCOUNTER — OFFICE VISIT (OUTPATIENT)
Dept: PAIN MEDICINE | Facility: CLINIC | Age: 80
End: 2025-03-28
Payer: MEDICARE

## 2025-03-28 VITALS
DIASTOLIC BLOOD PRESSURE: 63 MMHG | TEMPERATURE: 98 F | BODY MASS INDEX: 37.75 KG/M2 | HEIGHT: 61 IN | HEART RATE: 84 BPM | RESPIRATION RATE: 18 BRPM | WEIGHT: 199.94 LBS | OXYGEN SATURATION: 100 % | SYSTOLIC BLOOD PRESSURE: 132 MMHG

## 2025-03-28 DIAGNOSIS — G89.4 CHRONIC PAIN DISORDER: Primary | ICD-10-CM

## 2025-03-28 DIAGNOSIS — E11.40 PAINFUL DIABETIC NEUROPATHY: ICD-10-CM

## 2025-03-28 DIAGNOSIS — M54.16 LUMBAR RADICULOPATHY, CHRONIC: ICD-10-CM

## 2025-03-28 DIAGNOSIS — M47.816 LUMBAR SPONDYLOSIS: ICD-10-CM

## 2025-03-28 LAB
OHS QRS DURATION: 82 MS
OHS QTC CALCULATION: 457 MS

## 2025-03-28 PROCEDURE — 99999 PR PBB SHADOW E&M-EST. PATIENT-LVL III: CPT | Mod: PBBFAC,,, | Performed by: NURSE PRACTITIONER

## 2025-03-28 PROCEDURE — 99213 OFFICE O/P EST LOW 20 MIN: CPT | Mod: PBBFAC | Performed by: NURSE PRACTITIONER

## 2025-03-28 NOTE — PROGRESS NOTES
Chronic patient Established Note (Follow up visit)        Interval History 3/28/2025:  Ms. Jacobson returns to clinic today for follow up of foot pain. She reports worsened diabetic nerve pain over the last weeks. She notes that she can tell when it is time for the patch application. She continues to report low back pain. She has intermittent radiating pain into the legs. She reports benefit with current medication regimen. She is taking Robaxin, Lyrica, and Norco. She denies any adverse effects. She denies any other health changes. Her pain today is 4/10.    Interval History 1/23/2025:   Patient returns to clinic today for follow up of back pain. Her pain is focused to the lower back that is mainly axial in nature, but also radiates down the bilateral legs to the top and bottom of the foot. She states the pain gets better with laying flat. States the it is worse when bending down vs extension.  She continues taking Robaxin and Lyrica and Norco TID, these provide moderate relief. She denies red flag symptoms. She rates the pain as 8/10. . At her previous visit on 10/31/24, she had a positive multifidus lift test and positive prone instability test with MRI finding of multifidus  muscle atrophy and no facet arthropathy   In the past She underwent Nevro trial SCS for PDN which was not successful to relief her foot pain.    Interval History 12/27/2024:  The patient returns to clinic today for follow up of back and foot pain. She continues to diabetic nerve pain of her bilateral feet. This is burning in nature. This pain is worse with walking. She does have swelling into the feet. She does have benefit with Qutenza patches. She continues to take Lyrica, Robaxin, and Norco as needed. She denies any adverse effects. She denies any other health changes. Her pain today is 10/10.    Interval History 10/31/24:   She is here for follow up of lower back pain and L knee pain. She states the pain gets better with laying flat. States  Problem: Patient Care Overview  Goal: Plan of Care Review  Outcome: Ongoing (interventions implemented as appropriate)   06/14/19 8863   OTHER   Outcome Summary PT Reeval completed patient is able to ambulate 80 ft with min assist. anticipate patient to go home with HH upon D/C   Coping/Psychosocial   Plan of Care Reviewed With patient          the it is worse when bending down vs extension. She reports increased Tylenol and Ibuprofen use. She states she uses a walker to get around as well as a rollater walker. She rates the pain as a 9/10. She reports radicular symptoms on the L side.  Was doing physical therapy and home PT but has since finished. Still doing home exercises.     Interval History 9/24/2024:  The patient returns to clinic today for follow up of back and foot pain. She reports worsened neuropathic pain of her bilateral feet. She describes this as burning in nature. Her pain is worse with walking. She also notes swelling into the feet. She does have benefit with Qutenza patches. She denies any adverse effects. She is scheduled for knee injection in November. She is taking Lyrica and Robaxin. She also takes Norco as needed with benefit. She denies any adverse effects. She denies any other health changes. Her pain today is 7/10.    Interval History 9/19/2024:  Patient returns to clinic today for follow up of low back pain. Left worse than right. She continues to take her medication as prescribed: Robaxin 500mg TID PRN, Lyrica 150mg TID, Norco 10 TID PRN. She will return to clinic this coming Tuesday for Qutenza patch for PDN. Patient seen by BRAN Felix on 9/3/24 and was sent for new lumbar MRI, which she completed on 9/10/24, results below. Her back pain is aching, throbbing, and stabbing. Rated as a 10/10. Pain is worse with standing, bending forward, and leaning back. Improved with resting. Denies red flag symptoms.      Patient also endorses bilateral knee pain. She has received bilateral knee steroid injections and inquires about repeating those (L knee 3/28/24, R knee 4/18/24). She utilizes a cane to ambulate. L knee is worse than R knee. Pain focused to the lateral knees.     Interval History 9/3/2024:  The patient returns to clinic today for follow up of pain. Since last visit, she was admitted for CHF exacerbation. She spent a few weeks in  inpatient rehab. She is currently participating in home health PT. She reports worsened low back pain that radiates into the posterolateral aspect of both legs to her feet, left grater than right. Her pain is worse with walking, and getting in/out of a vehicle. She continues to report left knee pain. She does have benefit with Qutenza patches and needs to schedule the next application. She is taking Lyrica and Robaxin. She also takes Norco as needed for severe pain with benefit. She denies any adverse effects. She has been out of medication since Sunday. Her pain today is 10/10.    Interval History 5/10/2024:  The patient returns to clinic today for follow up of pain. She is s/p left knee steroid injection on 3/28/2024. She reports limited relief. She is s/p right knee steroid injection on 4/18/2024. She reports significant relief of her right knee pain. She continues to report neuropathic pain into her bilateral feet. She describes this pain as burning and tingling in nature. She does have benefit with Qutenza patches. She is taking Lyrica. She also takes Norco as needed with benefit. She denies any adverse effects. She denies any other health changes. Her pain today is 9/10.    Interval History 3/14/2024:  Indira Waters returns today for f/u of her painful neuropathy in the feet. She was last seen in January for this complaint. Qutenza patches were administered at that time. We also continued medication management with Robaxin, Lyrica, and Norco. She reports incredible relief of neuropathic symptoms with qutenza injections. Significantly improves walking ability. Primary complaint today is left knee pain. Started several years ago. Has been mostly intermittent over the years, but has been more constant and severe over the past 2 months. Localized over medial joint line. No falls. Pain today is an 8/10. Continues Norco 10/325 mg TID, consistently. No adverse effects. Feels like medications continue to help  with functional mobility and ADL ability..     Interval History 1/26/2024:  The patient returns to clinic today for foot pain. She continues to report neuropathic pain into her feet. She describes this as burning in nature. She does find benefit with Qutenza patches. She denies any rash or increased pain after the patch application. She continues to take Lyrica, Robaxin, and Norco with benefit. She denies any adverse effects. She denies any other health changes. Her pain today is 8/10.    Interval History 12/14/2023:  The patient returns to clinic today for follow up of back and leg pain. She continues to report low back pain that radiates into both legs. She continues to report bilateral neuropathic pain into the feet. She states that today is a bad day. She is having more left foot and toe pain. Her pain is worse with prolonged standing and walking. She does have benefit with Qutenza patches. She is taking Lyrica, Robaxin, and Norco with benefit and without adverse effects. She denies other health changes. Her pain today is 8/10.    Interval History 10/11/2023:  The patient returns to clinic today for follow up of bilateral foot pain. She continues to report neuropathic pain to her feet. Her pain is worse with standing and walking. She does have benefit with Qutenza patches. She also takes Lyrica, Robaxin, and Norco. She denies any adverse effects. She denies any other health changes. Her pain today is 8/10.    Interval History 9/15/2023:  The patient returns to clinic today for follow up of back and leg pain. She did have benefit with Qutenza patches. She continues to report low back pain with intermittent radicular leg pain. She continues to report neuropathic pain into her feet. She continues to take Lyrica and Robaxin with benefit. She also takes Norco as needed with benefit. She denies any adverse effects. She denies any other health changes. Her pain today is 9/10.    Interval History 6/27/2023:  The patient  returns to clinic today for follow up of pain. She continues to report nerve pain to her feet bilaterally. This pain is worse with standing and walking. She is taking Lyrica and Robaxin. She also takes Norco as needed with benefit and without adverse effects. She denies any other health changes. Her pain today is 9/10.    Interval History 6/16/2023:  The patient returns to clinic today for follow up of back and leg pain. She continues to report low back pain. She also reports diabetic neuropathy to her bilateral feet. Her pain is worse with standing and walking. She has tried Qutenza patches with benefit in the past. She continues to take Robaxin and Lyrica. She also takes Norco as needed with benefit and without adverse effects. She denies any other health changes. Her pain today is 10/10.    Interval History 3/17/2023:  Mrs Waters presents for follow up of chronic, continuous neuropathic pain to [facility]. She is s/p Nevro SCS trial and unfortunately she did not get the relief she was hoping for and 50% at best relief but this was not consistent. She has no constitutional s/s concerning for infection and denies newer neurological changes since trial. She continues with medication mgt and states Qutenza application has been providing significant benefit.     Interval History 2/10/2023:  Mrs Waters presents for follow up of chronic lower back painn and radicular pain in conjunction with PDN to bilateral feet. She is doing fair with medication mgt of Norco, Robaxin, and Lyrica and does need refill at this time. She denies SE of medications. She is also here for Qutenza application today. She is scheduled to have upcoming SCS trial with [facility] to aslo address her PDN.     Interval History 12/13/2022:  Mrs Waters presents for follow up of chronic pain. She is ready to repeat Qutenza application as it has been >91 days and she had significant improvement of symptoms of PDN. She recently had repeat A1C and just above 10.0 but is  decreasing. She continues to take medication with benefit and denies SE of medication. Medication regimen include Norco 10/325mg TID, Robaxin 500mg and Lyrica 150mg.     Interval History 10/12/2022:  Mrs Waters presents for follow up of chronic neuropathy. She is s/p qutenza patch placement with improved functioning and neuropathy control. Unfortunately her A1C was above 11 which inhibits her from Nevro SCS trial at this time. She is eager to have SCS trial. She denies new areas of pain or neurological changes. She continued to take  Norco 10/325mg TID, Robaxin 500mg and Lyrica 150mg TID with benefit and denies significant SE of medications.     Interval History 9/8/2022:  Mrs Waters presents for follow up of chronic lower back painn and radicular pain in conjunction with PDN to bilateral feet. She is doing fair with medication mgt of Norco, Robaxin, and Lyrica and does need refill at this time. She denies SE of medications. She is patiently awaiting her A1C to become lower than 10 to proceed with SCS trial.     Interval History 8/12/2022:  Mrs Waters presents for delayed FU. She has had continuous pain to lumbar and radicular pain but also peripheral neuropathy complaints. Over interval she has had CVA but doing fair. Her A1C has unfortunately elevated above 10 at this time and SCS trial placed on hold but phychiatric evaluation complete. She continues to take Norco 10/325mg TID, Robaxin 500mg TID and Lyrica 150mg TID with some benefit and denies SE of medications. No s/s concerning for cauda equina.     Interval History 4/12/2022:  Patient returns to clinic today for follow-up. Her neuropathic pain continues to be an issue for her, but she says that she is able to manage. She is taking Norco 10-325mg TID, Robaxin 500mg TID, and Lyrica 150mg TID without any adverse side effects. She denies any changes in her symptoms. She would like to proceed with SCS trial. Discussed last time that her HbA1c should be <10, was 9.8  on most recent labs. Seen by psychology and cleared for SCS.     Interval History 2/14/2022:  Mrs Waters presents for follow up. Pt states overall neuropathy continues. Since last visit she has been stable with medication mgt and takign Norco 10/325mg TID, Robaxin 500mg TID and Lyrica 150mg TID. She denies new areas of pain or neurological changes. Medication adequate to control pain without adverse SE.      Interval History 12/21/2021:  Pt presents for urgent evaluation s/p fall in kitchen last night. Pt unsure of LOC or head trauma but states some amnesia of events. Pt is having left knee pain, B ankle pain L>R and lower back/buttock pain. Daughter further states tremor like activity last night. During visit daughter asked for bedside commode due to inability to ambulate.  Pt during visit appeared somnolent, pale and began vomiting. Discussed going to ER for further evaluation.      Interval History 12/14/2021:  Mrs Waters presents for follow up of chronic pain complaints. She is hopeful she will have A1C lower soon to move forward with SCS. She is doing fair with medication mgt alone at this time and denies SE of medications. Pt is currently taking Norco 10/325mg TID PRN, Lyrica 150mg TID, and Robaxin 500mg TID. She denies new areas of pain or neurological changes.      Interval History 10/14/2021:  The Pt presents for follow up and s/p B Lumbar sympathetic blocks. Pt states this has done well but ready to proceed with SCS trial. She is aware A1C must be under tight control and Pt and daughter re-iterate knowing this. Pt also mentions DKA admission and diagnosis of vasculitis as well over interval. Pt continues to take hydrocodone 10/325 mg TID PRN, Lyrica 150 mg TID, and Robaxin 500mg TID. She denies any SE of medication, denies new neurological changes.      Interval Hx 09/16/2021  Indira Waters presents to the clinic for a follow-up appointment for f/u after bilateral lumbar sympathetic block on  8/25/21.Patient reports >50% relief after lumbar sympathetic block that lasted 2 weeks when she then developed a UTI/DKA, was admitted to the hospital and pain recurred.  Current pain intensity is 8/10.     Interval History 8/12/2021:  Indira Waters presents to the clinic for a follow-up appointment for BLE diabetic neuropathy, painful. Continues with Norco 10/325mg, Lyrica 150mg TID, Robaxin 750mg daily PRN. She had to cancel previously scheduled lumbar sympathetic block 2/2 lithotripsy for painful kidney stones, which have now passed. She still has intermittent pain with urination but overall that pain is improving. She had to cancel previous angiogram for this same reason - this has not been rescheduled yet. She denies any change in her LE pain. Denies any new neurological sxs in BLEs.     Interval History 6/10/21:  Indira Waters presents to the clinic for a 2 week follow-up appointment from lumbar sympathetic nerve block in early May. She reports minimal relief from this intervention, but did note that it helped some, briefly. Since the last visit, Indira Waters states the pain has been persistant. Current pain intensity is 10/10. Patient has chronic generalized diffuse pain, most pronounced in her BL lower extremities 2/2 diabetic neuropathy. HSe states that the pain is a little better than at her last visit. Most days are 10/10, but some days are better than others. Her pain is aggravated by exertion, walking, or sitting/standing for extended periods of time. He pain is mildly improved by rest and medications. She is currently taking Lyrica 150 PO TID, Zoloft, and Norco 10-325mg TID PRN. She states that the pain meds are helping but she is still constantly in pain and unable to participate in her ADLs. She has now established care with PCP for assistance w/ poorly controlled T2DM, last A1c 11.4. She has not yet established care w/ Rheumatology for fibromyalgia management.    Interval HPI  "4/15/21:  Patient returns for follow up of chronic generalized diffuse pain. At the last visit, had EMG (not yet completed), lumbar and hip x-ray imaging ordered. She was also referred to Rheumatology for fibromyalgia management and referral to PCP for DM2 management and weaning of zoloft for transition to cymbalta for treatment of neuropathy. She had her Lyrica increased to 150mg PO TID, and prescribed Norco 10mg TID with opioid contract signed. She has been taking Norco 10mg TID and it has been helping her "bad" pains but does not take all of her pain away. She has not yet been set up with her new PCP or rheumatologist yet for fibromyalgia. Still continues to have generalized pain everywhere including feet, hips, legs, lower and upper back, neck and shoulder pain. Continues to have neuropathy in the bilateral lower extremities due to uncontrolled DM2.    Initial Visit 3/11/21:  Indira Waters presents to the clinic for the evaluation of chronic pain. Complaining of pain everywhere including feet, legs, hips, lower and upper back, neck, shoulder. Pain started 5+ years ago. Pain 10/10 at worse. She was referred here by her PCP Dr. Ramos who she has been seeing for over 6 years. She states her pain is aggravated by any physical activity/movement. She takes lyrica, robaxin, and Norco 10 TID with mild relief of pain. Per patient, she was referred here by her PCP for medication refill. She has not tried physical therapy for several years, she states it did not help last time she was in PT. She says she is trying to exercise daily by doing yoga. She walks with a walker. She has numerous comorbidities including DM2 (Last A1C 9+ per prior family medicine note in Jan 2021), fibromyalgia, lupus, DDD. She states she would like to find a new PCP as she no longer lives near her old one. Patient denies night fever/night sweats, urinary incontinence, bowel incontinence and significant weight loss.      Pain Disability Index " Review:      10/31/2024    10:55 AM 9/24/2024     1:13 PM 9/19/2024    10:18 AM   Last 3 PDI Scores   Pain Disability Index (PDI) 56 49 56     Pain Medications:  Norco 10-325mg TID, Robaxin 500mg TID, and Lyrica 150mg TID    Opioid Contract: yes     report:  Reviewed and consistent with medication use as prescribed.    Pain Procedures:    - reports possible back injection 10+ years ago  - Lumbar sympathetic nerve block 05/05/21 - Dr. Pereira - minimal relief    Physical Therapy/Home Exercise: no     Imaging:   Hip X-ray 4/7/21  FINDINGS:  Osseous structures appear intact without evidence of fracture or osseous destructive process.  No apparent dislocation.     Modest degenerative change or significant joint space narrowing.     Lumbar X-ray 4/7/21  FINDINGS:  Diffuse bony demineralization.  Vertebral bodies are normal in height without evidence of fracture.  No pars defects.     Normal sagittal alignment is preserved.  No spondylolisthesis. No abnormal translation with flexion and extension.     Intervertebral disc heights are well maintained.  Mild facet arthropathy in the lower lumbar spine.     Mild scattered vascular calcification.     Impression:     No evidence of fracture or malalignment.     Mild facet arthropathy in the lower lumbar spine.     Diffuse bony demineralization.  Consider correlation with DEXA.    Allergies:   Review of patient's allergies indicates:   Allergen Reactions    Bleach (sodium hypochlorite) Shortness Of Breath    Nitrofurantoin macrocrystalline Anaphylaxis    Lipitor [atorvastatin] Diarrhea and Rash    Nsaids (non-steroidal anti-inflammatory drug)      Tolerates aspirin      Pcn [penicillins]     Statins-hmg-coa reductase inhibitors     Toradol [ketorolac]        Current Medications:   Current Outpatient Medications   Medication Sig Dispense Refill    acetaminophen (TYLENOL) 500 MG tablet Take 2 tablets (1,000 mg total) by mouth every 8 (eight) hours as needed for Pain.  0     "albuterol (ACCUNEB) 1.25 mg/3 mL Nebu Take 3 mLs (1.25 mg total) by nebulization every 6 (six) hours as needed (for wheezing or shortness of breath). Rescue 300 mL 11    allopurinoL (ZYLOPRIM) 300 MG tablet Take 1 tablet (300 mg total) by mouth once daily. 90 tablet 3    aspirin (ECOTRIN) 81 MG EC tablet Take 1 tablet (81 mg total) by mouth once daily. 30 tablet 0    BD ULTRA-FINE MICRO PEN NEEDLE 32 gauge x 1/4" Ndle Use with insulin 5 times a day. 400 each 10    blood sugar diagnostic Strp To check BG 6 times daily, to use with insurance preferred meter 200 each 11    blood-glucose meter kit To check BG 6 times daily, to use with insurance preferred meter 1 each 0    evolocumab (REPATHA SURECLICK) 140 mg/mL PnIj Inject 1 mL (140 mg total) into the skin every 14 (fourteen) days. 2 each 11    fenofibrate micronized (LOFIBRA) 134 MG Cap Take 1 capsule (134 mg total) by mouth daily with breakfast. 90 capsule 3    HYDROcodone-acetaminophen (NORCO)  mg per tablet Take 1 tablet by mouth every 8 (eight) hours as needed for Pain. 90 tablet 0    insulin glargine U-100, Lantus, (LANTUS SOLOSTAR U-100 INSULIN) 100 unit/mL (3 mL) InPn pen Inject 26 Units into the skin 2 (two) times a day. 60 mL 3    insulin lispro (HUMALOG KWIKPEN INSULIN) 100 unit/mL pen Inject 15 Units into the skin before breakfast AND 15 Units daily with lunch AND 11 Units daily with dinner or evening meal. Plus correction scale. Max TDD 50units.. 40 mL 3    ipratropium-albuteroL (COMBIVENT RESPIMAT)  mcg/actuation inhaler Inhale 1 puff into the lungs every 6 (six) hours. 12 g 3    lancets (TRUEPLUS LANCETS) 30 gauge Misc Check blood sugar 4 times daily 400 each 2    losartan (COZAAR) 50 MG tablet Take 1 tablet (50 mg total) by mouth once daily. 90 tablet 3    melatonin (MELATIN) 3 mg tablet Take 2 tablets (6 mg total) by mouth nightly as needed for Insomnia.      methocarbamoL (ROBAXIN) 500 MG Tab Take 500 mg by mouth 4 (four) times daily.   "    methocarbamoL (ROBAXIN) 500 MG Tab Take 1 tablet (500 mg total) by mouth 3 (three) times daily as needed (muscle pain). 90 tablet 2    metoclopramide HCl (REGLAN) 5 MG tablet TAKE ONE TABLET BY MOUTH FOUR TIMES DAILY AS NEEDED FOR nausea prevention 90 tablet 3    metoprolol succinate (TOPROL-XL) 100 MG 24 hr tablet Take 1 tablet (100 mg total) by mouth once daily. 90 tablet 3    miconazole nitrate 2% (MICOTIN) 2 % Oint Apply topically 2 (two) times daily. For 10 days 142 g 3    NIFEdipine (PROCARDIA-XL) 30 MG (OSM) 24 hr tablet TAKE ONE TABLET BY MOUTH TWICE DAILY 180 tablet 3    nitroGLYCERIN (NITROSTAT) 0.4 MG SL tablet DISSOLVE 1 TABLET UNDER THE TONGUE EVERY 5 MINUTES AS NEEDED FOR CHEST PAIN. DO NOT EXCEED A TOTAL OF 3 DOSES IN 15 MINUTES. 25 tablet 11    pregabalin (LYRICA) 150 MG capsule Take 1 capsule (150 mg total) by mouth 2 (two) times daily. 60 capsule 5    senna-docusate 8.6-50 mg (PERICOLACE) 8.6-50 mg per tablet Take 1 tablet by mouth 2 (two) times daily as needed for Constipation. 60 tablet 0    sertraline (ZOLOFT) 100 MG tablet TAKE ONE TABLET BY MOUTH EVERY DAY 90 tablet 3    torsemide (DEMADEX) 20 MG Tab Take 1 tablet (20 mg total) by mouth once daily. 90 tablet 3     No current facility-administered medications for this visit.       REVIEW OF SYSTEMS:    GENERAL:  No weight loss, malaise or fevers.  HEENT:  Negative for frequent or significant headaches.  NECK:  Negative for lumps, goiter, pain and significant neck swelling.  RESPIRATORY:  Negative for cough, wheezing or shortness of breath.  CARDIOVASCULAR:  Negative for chest pain, leg swelling or palpitations. HTN  GI:  Negative for abdominal discomfort, blood in stools or black stools or change in bowel habits.  MUSCULOSKELETAL:  See HPI.  SKIN:  Negative for lesions, rash, and itching.  ENDO: Diabetes  PSYCH:  Negative for sleep disturbance, mood disorder and recent psychosocial stressors.  HEMATOLOGY/LYMPHOLOGY:  Negative for prolonged  bleeding, bruising easily or swollen nodes.  NEURO:   No history of headaches, syncope, paralysis, seizures or tremors.  All other reviewed and negative other than HPI.    Past Medical History:  Past Medical History:   Diagnosis Date    Arthritis     Back pain     Cancer     ovarian    Cervical cancer     Coronary artery disease     Depression     Diabetes mellitus     Fibromyalgia     Heart attack     History of MI (myocardial infarction)     Hyperlipidemia     Hypertension     Lupus     Stroke     slight left sided weakness       Past Surgical History:  Past Surgical History:   Procedure Laterality Date    APPENDECTOMY       SECTION      2    CORONARY ANGIOGRAPHY N/A 2022    Procedure: ANGIOGRAM, CORONARY ARTERY;  Surgeon: Jose L Méndez MD;  Location: [facility] CATH LAB;  Service: Cardiology;  Laterality: N/A;    HYSTERECTOMY      with USO for cervical cancer    INJECTION OF ANESTHETIC AGENT AROUND NERVE Bilateral 2021    Procedure: BLOCK, NERVE, SYMPATHIC;  Surgeon: Holden Pereira MD;  Location: [facility] PAIN MGT;  Service: Pain Management;  Laterality: Bilateral;    INJECTION OF ANESTHETIC AGENT AROUND NERVE N/A 2021    Procedure: BLOCK, NERVE, SYMPATHETIC  need consent;  Surgeon: Holden Pereira MD;  Location: [facility] PAIN MGT;  Service: Pain Management;  Laterality: N/A;    YARED LINK,SWALLOW FUNCTION,CINE/VIDEO RECORD  2021         TONSILLECTOMY      TRIAL OF SPINAL CORD NERVE STIMULATOR N/A 3/8/2023    Procedure: LUMBAR SPINAL CORD STIMULATOR TRIAL NEVRO REP PATIENT STATES SHE NO LONGER TAKES PLAVIX;  Surgeon: Holden Pereira MD;  Location: [facility] PAIN MGT;  Service: Pain Management;  Laterality: N/A;       Family History:  Family History   Problem Relation Name Age of Onset    COPD Mother      Lupus Mother      Hernia Mother      Uterine cancer Mother          vs cervical cancer    Ovarian cancer Mother      Diabetes Father      Coronary artery disease Father      Colon cancer  "Maternal Grandmother          in her 50's       Social History:  Social History     Socioeconomic History    Marital status:    Tobacco Use    Smoking status: Former     Current packs/day: 0.00     Types: Cigarettes     Quit date: 2020     Years since quittin.4     Passive exposure: Past    Smokeless tobacco: Never   Substance and Sexual Activity    Alcohol use: Not Currently     Comment: occasionally    Drug use: Yes     Types: Hydrocodone     Comment: three times a day    Sexual activity: Not Currently   Social History Narrative    Lives with daughter. .      Social Drivers of Health     Financial Resource Strain: Low Risk  (2024)    Overall Financial Resource Strain (CARDIA)     Difficulty of Paying Living Expenses: Not hard at all   Food Insecurity: No Food Insecurity (2024)    Hunger Vital Sign     Worried About Running Out of Food in the Last Year: Never true     Ran Out of Food in the Last Year: Never true   Transportation Needs: No Transportation Needs (2024)    TRANSPORTATION NEEDS     Transportation : No   Physical Activity: Inactive (2024)    Exercise Vital Sign     Days of Exercise per Week: 0 days     Minutes of Exercise per Session: 0 min   Stress: No Stress Concern Present (2024)    [employer] - Occupational Stress Questionnaire     Feeling of Stress : Not at all   Housing Stability: Unknown (2024)    Housing Stability Vital Sign     Unable to Pay for Housing in the Last Year: No     Homeless in the Last Year: No       OBJECTIVE:    /63   Pulse 84   Temp 98 °F (36.7 °C)   Resp 18   Ht 5' 1" (1.549 m)   Wt 90.7 kg (199 lb 15.3 oz)   SpO2 100%   BMI 37.78 kg/m²     PHYSICAL EXAMINATION:     General appearance: Well appearing, in no acute distress, alert and oriented x3.  Psych:  Mood and affect appropriate.  Skin: Skin color, texture, turgor normal, no rashes or lesions, in both upper and lower " body.  Head/face:  Atraumatic, normocephalic.  Pulm: Symmetric chest rise, no respiratory distress noted.   Back: Straight leg raise in the sitting position is positive for radicular pain. There is pain with palpation over lumbar facet joints. Limited ROM with pain on flexion and extension.   Musculoskeletal: There is pain with palpation over bilateral SI joints. There is pain with palpation over medial and lateral joint line of left knee. 5/5 strength in right ankle with plantar and dorsiflexion. 5/5 strength in left ankle with plantar and dorsiflexion. 4/5 strength with right knee flexion and extension. 4/5 strength with left knee flexion and extension.   Neuro:  Decreased sensation to light touch to BLE.   Gait: Antalgic- ambulates with wheelchair.     ASSESSMENT: 80 y.o. year old female with chronic pain, consistent with:     1. Chronic pain disorder        2. Painful diabetic neuropathy        3. Lumbar radiculopathy, chronic        4. Lumbar spondylosis            PLAN:     - Previous imaging reviewed. Labs reviewed.     - Qutenza patch as per below.     - Continue Robaxin 500 mg TID PRN.     - Continue Lyrica 150 mg TID.     - Continue Norco 10/325 mg TID PRN, #90, refill already sent.     - The patient is here today for a refill of current pain medications and they believe these provide effective pain control and improvements in quality of life.  The patient notes no serious side effects, and feels the benefits outweigh the risks.  The patient was reminded of the pain contract that they signed previously as well as the risks and benefits of the medication including possible death.  The updated [place] [employer] prescription monitoring program was reviewed, and the patient has been found to be compliant with current treatment plan.     - UDS from 9/19/2024 reviewed and consistent.      - RTC in 3 months.       The above plan and management options were discussed at length with patient. Patient is in  agreement with the above and verbalized understanding.    Elvira Hylton NP  03/28/2025    PATIENT NAME: Indira Waters        MRN: [id number]    Diagnosis: Painful Diabetic Neuropathy E13.42     Postprocedural Diagnosis: Same     Complications: None     Informed Consent:  The patient's condition and proposed procedures, risks (including complications of nerve damage, skin irritation, infection, and failure of pain relief), and alternatives were discussed with the patient or responsible party.  The patient's/responsible party's questions were answered.  The patient/responsible party appeared to understand and chose to proceed.       Procedural Pause:  A procedural pause verifying correct patient, medical record number, allergies, medications to be administered, current vital signs, and proper application site was performed immediately prior to beginning the procedure.     Procedure in Detail:  The patient was placed in a sitting position. 4 patches were used for the procedure today.  The patches were applied to the target area and secured with tape.  A coban was used to further secure the patches in place.  The patient was allowed to rest in a comfortable position, and monitored by staff every 10-15 minutes to ensure comfort. The patches remained in place for 30 minutes.  The patches were then removed and the cleaning gel was applied and allowed to sit for an additional 60 seconds, after which the area was wiped clean.      The patient was then discharged in stable condition.        DISCUSSION:  A Qutenza patch was applied today with the purpose of treating the patients nerve pain. They were informed that they may have some burning of the skin for a few days, and should not take a hot shower for 2-3 days as that may irritate the area.  They were informed that the area may feel like they had a sunburn. They were informed that it can take about 2-4 weeks for the effects of the patch to be fully realized     Plan  to repeat every 91 days.

## 2025-03-28 NOTE — PROGRESS NOTES
Diabetes Care Specialist Progress Note  Author: Elena Villatoro RD, CDCES  Date: 3/28/2025    Intake    Program Intake  Reason for Diabetes Program Visit:: Intervention  Type of Intervention:: Individual  Individual: Education  Education: Self-Management Skill Review  Current diabetes risk level:: moderate  In the last month, have you used the ER or been admitted to the hospital: Yes  Was the ER or hospital admission related to diabetes?: No  Permission to speak with others about care:: yes    Current Diabetes Treatment: Insulin  Method of insulin delivery?: Injections  Injection Type: Pens  Pen Type/Dose: Lantus 26 units BID, Humalog 15/15/11 + scale 200-250 +1, 251-300 +, >/=301 +3    Continuous Glucose Monitoring  Patient has CGM: Yes  Personal CGM type:: Dexcom G7  GMI Date: 03/27/25  GMI Value: 8.1 %    Lab Results   Component Value Date    HGBA1C 7.9 (H) 03/06/2025         Diabetes Self-Management Skills Assessment    Physical Activity/Exercise  Patient's daily activity level:: lightly active  Patient formally exercises outside of work.: yes  Frequency: four or more times a week (doing PT exercises at home and taking the stairs)  Patient can identify forms of physical activity.: yes  Physical Activity/Exercise Skills Assessment Completed: : Yes  Assessment indicates:: Adequate understanding  Area of need?: No    Home Blood Glucose Monitoring  Personal CGM type:: Dexcom G7         Chronic Complications  Reviewed health maintenance: yes  Have you completed your annual diabetes maintenance labwork? : yes  Chronic Complications Skills Assessment Completed: : Yes  Assessment indicates:: Adequate understanding  Area of need?: No      Assessment Summary and Plan    Based on today's diabetes care assessment, the following areas of need were identified:      Identified Areas of Need      Physical Activity/Exercise: No    Chronic Complications: No       Today's interventions were provided through individual discussion,  instruction, and written materials were provided.      Patient verbalized understanding of instruction and written materials.  Pt was able to return back demonstration of instructions today. Patient understood key points, needs reinforcement and further instruction.     Diabetes Self-Management Care Plan:    Today's Diabetes Self-Management Care Plan was developed with Indira's input. Indira has agreed to work toward the following goal(s) to improve his/her overall diabetes control.      Care Plan: Diabetes Management   Updates made since 3/28/2024 12:00 AM        Problem: Medications         Goal: Patient Agrees to take Diabetes Medication(s) MDI as prescribed.    Start Date: 8/6/2024   Expected End Date: 9/12/2024   This Visit's Progress: Met   Priority: High   Barriers: No Barriers Identified   Note:    8/6/24 - Dtr (Esha) is assisting with pt's insulin administration. Confirms she does not miss a dose and is consistently taking at appropriate times. Observed injection sites (prefers abdomen - less painful) - some bruising but minimal and does not appear to have much scar tissue. Appropriately rotating sites. She is taking more than currently prescribed - taking Levemir 20 in am and 24 in pm and taking Humalog 11 AC + scale starting at 150-200 +2 units. Overall, glucose remaining well above goal (see attached logs). Educated on insulin titration guidelines and dtr showed good understanding. She has not seen endocrinology since late last year - lost to follow-up - Pt is interested in getting back in with Giuliana. Sent message to Giuliana Montero NP staff to assist with scheduling.    3/27/25 - Dtr is assisting with administering insulin injections and consistently providing MDI as prescribed and appropriately timing prandial insulin 5-15 min before meals. Reviewed Dexcom report with with pt and dtr. TIR 40% and Avg glucose 198. Lows have been mostly resolved. Will share report with Giuliana Montero NP to see if further  "insulin dose adjustment warranted. Noticed big variations from day to day - some of this r/t food choices and occasionally "splurges" and active/inactive days but also having more pain some days vs others. Briefly discussed pump therapy could be a good tool to help with getting ahead of highs/lows - pt would not be able to use pump on her own but I'm confident dtr would be able to manage it (and dtr is with her 24/7). Briefly explained what pump therapy is, training/tech involved, and potential cost. Pt is currently using Dexcom with  and phone is not compatible with demarcus - discussed would not be able to use both  and pump at the same time. Omnipod particularly would not be an option unless she got a phone that is compatible with the Dexcom demarcus. Dtr and pt will think about it.        Task: Reviewed with patient all current diabetes medications and provided basic review of the purpose, dosage, frequency, side effects, and storage of both oral and injectable diabetes medications. Completed 8/13/2024        Task: Discussed guidelines for preventing, detecting and treating hypoglycemia and hyperglycemia and reviewed the importance of meal and medication timing with diabetes mediations for prevention of hypoglycemia and maximum drug benefit. Completed 8/13/2024        Problem: Blood Glucose Self-Monitoring         Goal: Pt will check affordability of personal CGM.    Start Date: 8/6/2024   Expected End Date: 9/12/2024   This Visit's Progress: Met   Priority: Medium   Barriers: No Barriers Identified   Note:    8/6/24 - Pt is interested in personal CGM but has not been able to get it. Suspect it is d/t insurance - she has traditional Medicare A & B and [place] Medicaid, which requires going through DME. Explained process with pt and dtr and what to expect. Emphasized she will receive a call from DME regarding the order and must answer/call back before they will ship to her home. Dr. Zapata signed order for " Dexcom G7 and sent to [facility].     3/27/25 - Using Dexcom G7 and loves it! They report it has been a game changer for her. Had an issue with a faulty sensor that was reading consistently low all morning, fingerstick reading confirmed large discrepancy. Dtr went ahead and changed sensor early and denies having any issues since. Reviewed calibrating Dexcom, which she had tried but didn't fix discrepancy. Reviewed contacting Dexcom to get replacement for faulty sensor.        Task: Reviewed the importance of self-monitoring blood glucose and keeping logs. Completed 8/13/2024        Problem: Healthy Eating         Goal: Will add protein with breakfast.    Start Date: 8/6/2024   Expected End Date: 9/12/2024   This Visit's Progress: Met   Priority: Low   Barriers: No Barriers Identified   Note:    8/6/24 - Dtr verbalized has done a lot of her own research on dm diet and received a lot of information from dietitian at rehab facility. She reports looking at glycemic index of foods and trying to really limit carbs. Pt does eat cream of wheat every morning - loves her cream of wheat and this one is important to her. Lunch is usually a sandwich on keto bread. Dinner is lightest carb meal - usually protein and non-starchy vegetables only. Does have some lower GI fruit for a snack and sometimes a spoonful of pb. Discussed does not need to avoid carbs all together but rather focus on portion sizes, choosing high fiber carbs, and balancing meals with protein. Encouraged adding a protein source to breakfast - likes eggs and willing to add to breakfast. Also discussed having lower HS readings because of very low carb dinner - encouraged adding a high fiber carb to dinner (beans, whole grain, berries, etc). Encouraged if BG <100 HS, to have carb + protein snack before bed. She likes 1 gopal cracker plank + pb for an HS snack.     3/27/25 - Has added protein to breakfast. Having variability from day to day some r/t food choices.  "Overall, they are balancing home prepared meals with protein, carb, and non-starchy vegetables. She is incorporating more non-starchy veg and controlling carb portion as previously discussed. Avoiding sugary drinks (only uses regular drink for treating low). Occasionally eats a "cheat" meal - burger and fries last night - has a "splurge" maybe once a month. Discussed reducing carb intake when "cheating" - if going to have a burger, skip fries,  using [employer] demarcus to find lower carb menu choices.        Task: Reviewed the sources and role of Carbohydrate, Protein, and Fat and how each nutrient impacts blood sugar. Completed 8/13/2024        Task: Provided visual examples using dry measuring cups, food models, and other familiar objects such as computer mouse, deck or cards, tennis ball etc. to help with visualization of portions. Completed 8/13/2024        Task: Discussed strategies for choosing healthier menu options when dining out. Completed 3/28/2025        Task: Review the importance of balancing carbohydrates with each meal using portion control techniques to count servings of carbohydrate and label reading to identify serving size and amount of total carbs per serving. Completed 8/13/2024        Task: Provided Sample plate method and reviewed the use of the plate to estimate amounts of carbohydrate per meal. Completed 3/28/2025          Follow Up Plan     Follow up in about 1 month (around 4/29/2025) for 1 month follow-up.    Today's care plan and follow up schedule was discussed with patient.  Indira verbalized understanding of the care plan, goals, and agrees to follow up plan.        The patient was encouraged to communicate with his/her health care provider/physician and care team regarding his/her condition(s) and treatment.  I provided the patient with my contact information today and encouraged to contact me via phone or [facility]'s Patient Portal as needed.     Length of Visit   Total Time: 70 Minutes "

## 2025-03-31 ENCOUNTER — HOSPITAL ENCOUNTER (INPATIENT)
Facility: OTHER | Age: 80
LOS: 9 days | Discharge: HOME HEALTH | DRG: 871 | End: 2025-04-09
Attending: EMERGENCY MEDICINE | Admitting: STUDENT IN AN ORGANIZED HEALTH CARE EDUCATION/TRAINING PROGRAM
Payer: MEDICARE

## 2025-03-31 DIAGNOSIS — J44.9 CHRONIC OBSTRUCTIVE PULMONARY DISEASE, UNSPECIFIED COPD TYPE: Primary | ICD-10-CM

## 2025-03-31 DIAGNOSIS — J18.9 COMMUNITY ACQUIRED PNEUMONIA: ICD-10-CM

## 2025-03-31 DIAGNOSIS — I50.42 CHRONIC COMBINED SYSTOLIC AND DIASTOLIC CHF (CONGESTIVE HEART FAILURE): ICD-10-CM

## 2025-03-31 PROBLEM — Z99.81 ON HOME OXYGEN THERAPY: Status: ACTIVE | Noted: 2025-03-31

## 2025-03-31 LAB
ABSOLUTE EOSINOPHIL (OHS): 0.01 K/UL
ABSOLUTE MONOCYTE (OHS): 1.26 K/UL (ref 0.3–1)
ABSOLUTE NEUTROPHIL COUNT (OHS): 28.26 K/UL (ref 1.8–7.7)
ALBUMIN SERPL BCP-MCNC: 3.3 G/DL (ref 3.5–5.2)
ALP SERPL-CCNC: 71 UNIT/L (ref 40–150)
ALT SERPL W/O P-5'-P-CCNC: 15 UNIT/L (ref 10–44)
ANION GAP (OHS): 12 MMOL/L (ref 8–16)
ANISOCYTOSIS BLD QL SMEAR: SLIGHT
AST SERPL-CCNC: 19 UNIT/L (ref 11–45)
BASOPHILS # BLD AUTO: 0.1 K/UL
BASOPHILS NFR BLD AUTO: 0.3 %
BILIRUB SERPL-MCNC: 0.5 MG/DL (ref 0.1–1)
BILIRUB UR QL STRIP.AUTO: NEGATIVE
BNP SERPL-MCNC: 547 PG/ML (ref 0–99)
BUN SERPL-MCNC: 21 MG/DL (ref 8–23)
CALCIUM SERPL-MCNC: 9.1 MG/DL (ref 8.7–10.5)
CHLORIDE SERPL-SCNC: 101 MMOL/L (ref 95–110)
CLARITY UR: CLEAR
CO2 SERPL-SCNC: 21 MMOL/L (ref 23–29)
COLOR UR AUTO: COLORLESS
CREAT SERPL-MCNC: 1.4 MG/DL (ref 0.5–1.4)
ERYTHROCYTE [DISTWIDTH] IN BLOOD BY AUTOMATED COUNT: 16.1 % (ref 11.5–14.5)
GFR SERPLBLD CREATININE-BSD FMLA CKD-EPI: 38 ML/MIN/1.73/M2
GLUCOSE SERPL-MCNC: 328 MG/DL (ref 70–110)
GLUCOSE UR QL STRIP: ABNORMAL
HCT VFR BLD AUTO: 32.7 % (ref 37–48.5)
HGB BLD-MCNC: 10.4 GM/DL (ref 12–16)
HGB UR QL STRIP: NEGATIVE
IMM GRANULOCYTES # BLD AUTO: 0.37 K/UL (ref 0–0.04)
IMM GRANULOCYTES NFR BLD AUTO: 1.1 % (ref 0–0.5)
KETONES UR QL STRIP: NEGATIVE
LDH SERPL L TO P-CCNC: 1.7 MMOL/L (ref 0.5–2.2)
LEUKOCYTE ESTERASE UR QL STRIP: NEGATIVE
LYMPHOCYTES # BLD AUTO: 2.25 K/UL (ref 1–4.8)
MAGNESIUM SERPL-MCNC: 1.7 MG/DL (ref 1.6–2.6)
MAGNESIUM SERPL-MCNC: NORMAL MG/DL
MCH RBC QN AUTO: 27.9 PG (ref 27–31)
MCHC RBC AUTO-ENTMCNC: 31.8 G/DL (ref 32–36)
MCV RBC AUTO: 88 FL (ref 82–98)
NITRITE UR QL STRIP: NEGATIVE
NUCLEATED RBC (/100WBC) (OHS): 0 /100 WBC
OHS QRS DURATION: 94 MS
OHS QTC CALCULATION: 455 MS
PH UR STRIP: 6 [PH]
PHOSPHATE SERPL-MCNC: 4.2 MG/DL (ref 2.7–4.5)
PHOSPHATE SERPL-MCNC: NORMAL MG/DL
PLATELET # BLD AUTO: 376 K/UL (ref 150–450)
PLATELET BLD QL SMEAR: NORMAL
PMV BLD AUTO: 12.2 FL (ref 9.2–12.9)
POCT GLUCOSE: 409 MG/DL (ref 70–110)
POCT GLUCOSE: 424 MG/DL (ref 70–110)
POCT GLUCOSE: 433 MG/DL (ref 70–110)
POCT GLUCOSE: 458 MG/DL (ref 70–110)
POTASSIUM SERPL-SCNC: 4.4 MMOL/L (ref 3.5–5.1)
PROCALCITONIN SERPL-MCNC: 1.34 NG/ML
PROT SERPL-MCNC: 7.3 GM/DL (ref 6–8.4)
PROT UR QL STRIP: NEGATIVE
RBC # BLD AUTO: 3.73 M/UL (ref 4–5.4)
RELATIVE EOSINOPHIL (OHS): 0 %
RELATIVE LYMPHOCYTE (OHS): 7 % (ref 18–48)
RELATIVE MONOCYTE (OHS): 3.9 % (ref 4–15)
RELATIVE NEUTROPHIL (OHS): 87.7 % (ref 38–73)
SAMPLE: NORMAL
SODIUM SERPL-SCNC: 134 MMOL/L (ref 136–145)
SP GR UR STRIP: 1.01
TOXIC GRANULES BLD QL SMEAR: PRESENT
TROPONIN I SERPL DL<=0.01 NG/ML-MCNC: 0.01 NG/ML
UROBILINOGEN UR STRIP-ACNC: NEGATIVE EU/DL
WBC # BLD AUTO: 32.25 K/UL (ref 3.9–12.7)

## 2025-03-31 PROCEDURE — 94761 N-INVAS EAR/PLS OXIMETRY MLT: CPT

## 2025-03-31 PROCEDURE — 25000003 PHARM REV CODE 250: Performed by: EMERGENCY MEDICINE

## 2025-03-31 PROCEDURE — 27000221 HC OXYGEN, UP TO 24 HOURS

## 2025-03-31 PROCEDURE — 94640 AIRWAY INHALATION TREATMENT: CPT

## 2025-03-31 PROCEDURE — 99900035 HC TECH TIME PER 15 MIN (STAT)

## 2025-03-31 PROCEDURE — 84484 ASSAY OF TROPONIN QUANT: CPT | Performed by: EMERGENCY MEDICINE

## 2025-03-31 PROCEDURE — 84100 ASSAY OF PHOSPHORUS: CPT | Performed by: EMERGENCY MEDICINE

## 2025-03-31 PROCEDURE — 63600175 PHARM REV CODE 636 W HCPCS: Performed by: EMERGENCY MEDICINE

## 2025-03-31 PROCEDURE — 84145 PROCALCITONIN (PCT): CPT | Performed by: NURSE PRACTITIONER

## 2025-03-31 PROCEDURE — 94664 DEMO&/EVAL PT USE INHALER: CPT

## 2025-03-31 PROCEDURE — 63600175 PHARM REV CODE 636 W HCPCS: Performed by: NURSE PRACTITIONER

## 2025-03-31 PROCEDURE — 83880 ASSAY OF NATRIURETIC PEPTIDE: CPT | Performed by: EMERGENCY MEDICINE

## 2025-03-31 PROCEDURE — 94799 UNLISTED PULMONARY SVC/PX: CPT

## 2025-03-31 PROCEDURE — 80053 COMPREHEN METABOLIC PANEL: CPT | Performed by: EMERGENCY MEDICINE

## 2025-03-31 PROCEDURE — 87040 BLOOD CULTURE FOR BACTERIA: CPT | Performed by: EMERGENCY MEDICINE

## 2025-03-31 PROCEDURE — 27000646 HC AEROBIKA DEVICE

## 2025-03-31 PROCEDURE — 25000003 PHARM REV CODE 250: Performed by: NURSE PRACTITIONER

## 2025-03-31 PROCEDURE — 81003 URINALYSIS AUTO W/O SCOPE: CPT | Performed by: EMERGENCY MEDICINE

## 2025-03-31 PROCEDURE — 83735 ASSAY OF MAGNESIUM: CPT | Performed by: EMERGENCY MEDICINE

## 2025-03-31 PROCEDURE — 21400001 HC TELEMETRY ROOM

## 2025-03-31 PROCEDURE — 25000242 PHARM REV CODE 250 ALT 637 W/ HCPCS: Performed by: EMERGENCY MEDICINE

## 2025-03-31 PROCEDURE — 96372 THER/PROPH/DIAG INJ SC/IM: CPT | Performed by: EMERGENCY MEDICINE

## 2025-03-31 PROCEDURE — 85025 COMPLETE CBC W/AUTO DIFF WBC: CPT | Performed by: EMERGENCY MEDICINE

## 2025-03-31 RX ORDER — TALC
6 POWDER (GRAM) TOPICAL NIGHTLY PRN
Status: DISCONTINUED | OUTPATIENT
Start: 2025-03-31 | End: 2025-04-02

## 2025-03-31 RX ORDER — CEFEPIME HYDROCHLORIDE 2 G/1
2 INJECTION, POWDER, FOR SOLUTION INTRAVENOUS
Status: COMPLETED | OUTPATIENT
Start: 2025-03-31 | End: 2025-03-31

## 2025-03-31 RX ORDER — IBUPROFEN 200 MG
16 TABLET ORAL
Status: DISCONTINUED | OUTPATIENT
Start: 2025-03-31 | End: 2025-04-01

## 2025-03-31 RX ORDER — HYDROCODONE BITARTRATE AND ACETAMINOPHEN 5; 325 MG/1; MG/1
1 TABLET ORAL EVERY 4 HOURS PRN
Refills: 0 | Status: DISCONTINUED | OUTPATIENT
Start: 2025-03-31 | End: 2025-04-09 | Stop reason: HOSPADM

## 2025-03-31 RX ORDER — AMOXICILLIN 250 MG
1 CAPSULE ORAL 2 TIMES DAILY
Status: DISCONTINUED | OUTPATIENT
Start: 2025-03-31 | End: 2025-04-09 | Stop reason: HOSPADM

## 2025-03-31 RX ORDER — LOSARTAN POTASSIUM 50 MG/1
50 TABLET ORAL DAILY
Status: DISCONTINUED | OUTPATIENT
Start: 2025-04-01 | End: 2025-04-09 | Stop reason: HOSPADM

## 2025-03-31 RX ORDER — IBUPROFEN 200 MG
24 TABLET ORAL
Status: DISCONTINUED | OUTPATIENT
Start: 2025-03-31 | End: 2025-04-01

## 2025-03-31 RX ORDER — GLUCAGON 1 MG
1 KIT INJECTION
Status: DISCONTINUED | OUTPATIENT
Start: 2025-03-31 | End: 2025-04-01

## 2025-03-31 RX ORDER — INSULIN ASPART 100 [IU]/ML
0-5 INJECTION, SOLUTION INTRAVENOUS; SUBCUTANEOUS
Status: DISCONTINUED | OUTPATIENT
Start: 2025-03-31 | End: 2025-04-01

## 2025-03-31 RX ORDER — ALLOPURINOL 300 MG/1
300 TABLET ORAL DAILY
Status: DISCONTINUED | OUTPATIENT
Start: 2025-04-01 | End: 2025-04-09 | Stop reason: HOSPADM

## 2025-03-31 RX ORDER — INSULIN GLARGINE 100 [IU]/ML
12 INJECTION, SOLUTION SUBCUTANEOUS 2 TIMES DAILY
Status: DISCONTINUED | OUTPATIENT
Start: 2025-03-31 | End: 2025-04-01

## 2025-03-31 RX ORDER — NIFEDIPINE 30 MG/1
30 TABLET, EXTENDED RELEASE ORAL 2 TIMES DAILY
Status: DISCONTINUED | OUTPATIENT
Start: 2025-03-31 | End: 2025-04-09 | Stop reason: HOSPADM

## 2025-03-31 RX ORDER — ACETAMINOPHEN 325 MG/1
650 TABLET ORAL EVERY 4 HOURS PRN
Status: DISCONTINUED | OUTPATIENT
Start: 2025-03-31 | End: 2025-04-09 | Stop reason: HOSPADM

## 2025-03-31 RX ORDER — ACETAMINOPHEN 500 MG
1000 TABLET ORAL
Status: COMPLETED | OUTPATIENT
Start: 2025-03-31 | End: 2025-03-31

## 2025-03-31 RX ORDER — METOPROLOL SUCCINATE 50 MG/1
100 TABLET, EXTENDED RELEASE ORAL DAILY
Status: DISCONTINUED | OUTPATIENT
Start: 2025-04-01 | End: 2025-04-09 | Stop reason: HOSPADM

## 2025-03-31 RX ORDER — ONDANSETRON 8 MG/1
8 TABLET, ORALLY DISINTEGRATING ORAL EVERY 8 HOURS PRN
Status: DISCONTINUED | OUTPATIENT
Start: 2025-03-31 | End: 2025-04-09 | Stop reason: HOSPADM

## 2025-03-31 RX ORDER — INSULIN ASPART 100 [IU]/ML
7 INJECTION, SOLUTION INTRAVENOUS; SUBCUTANEOUS
Status: DISCONTINUED | OUTPATIENT
Start: 2025-03-31 | End: 2025-04-01

## 2025-03-31 RX ORDER — IPRATROPIUM BROMIDE AND ALBUTEROL SULFATE 2.5; .5 MG/3ML; MG/3ML
3 SOLUTION RESPIRATORY (INHALATION) EVERY 4 HOURS PRN
Status: DISCONTINUED | OUTPATIENT
Start: 2025-03-31 | End: 2025-04-01

## 2025-03-31 RX ORDER — VANCOMYCIN 2 GRAM/500 ML IN 0.9 % SODIUM CHLORIDE INTRAVENOUS
2000
Status: COMPLETED | OUTPATIENT
Start: 2025-03-31 | End: 2025-03-31

## 2025-03-31 RX ORDER — ASPIRIN 81 MG/1
81 TABLET ORAL DAILY
Status: DISCONTINUED | OUTPATIENT
Start: 2025-04-01 | End: 2025-04-09 | Stop reason: HOSPADM

## 2025-03-31 RX ORDER — SERTRALINE HYDROCHLORIDE 100 MG/1
100 TABLET, FILM COATED ORAL DAILY
Status: DISCONTINUED | OUTPATIENT
Start: 2025-04-01 | End: 2025-04-09 | Stop reason: HOSPADM

## 2025-03-31 RX ORDER — METHOCARBAMOL 500 MG/1
500 TABLET, FILM COATED ORAL 3 TIMES DAILY PRN
Status: DISCONTINUED | OUTPATIENT
Start: 2025-03-31 | End: 2025-04-09 | Stop reason: HOSPADM

## 2025-03-31 RX ORDER — GUAIFENESIN AND DEXTROMETHORPHAN HYDROBROMIDE 10; 100 MG/5ML; MG/5ML
10 SYRUP ORAL EVERY 4 HOURS PRN
Status: DISCONTINUED | OUTPATIENT
Start: 2025-03-31 | End: 2025-04-02

## 2025-03-31 RX ORDER — PREGABALIN 75 MG/1
150 CAPSULE ORAL 2 TIMES DAILY
Status: DISCONTINUED | OUTPATIENT
Start: 2025-03-31 | End: 2025-04-09 | Stop reason: HOSPADM

## 2025-03-31 RX ORDER — IPRATROPIUM BROMIDE AND ALBUTEROL SULFATE 2.5; .5 MG/3ML; MG/3ML
3 SOLUTION RESPIRATORY (INHALATION)
Status: COMPLETED | OUTPATIENT
Start: 2025-03-31 | End: 2025-03-31

## 2025-03-31 RX ORDER — SODIUM CHLORIDE 0.9 % (FLUSH) 0.9 %
10 SYRINGE (ML) INJECTION
Status: DISCONTINUED | OUTPATIENT
Start: 2025-03-31 | End: 2025-04-09 | Stop reason: HOSPADM

## 2025-03-31 RX ORDER — CEFEPIME HYDROCHLORIDE 1 G/1
1 INJECTION, POWDER, FOR SOLUTION INTRAMUSCULAR; INTRAVENOUS
Status: DISCONTINUED | OUTPATIENT
Start: 2025-03-31 | End: 2025-04-01

## 2025-03-31 RX ORDER — POLYETHYLENE GLYCOL 3350 17 G/17G
17 POWDER, FOR SOLUTION ORAL 2 TIMES DAILY PRN
Status: DISCONTINUED | OUTPATIENT
Start: 2025-03-31 | End: 2025-04-05

## 2025-03-31 RX ORDER — ONDANSETRON HYDROCHLORIDE 2 MG/ML
8 INJECTION, SOLUTION INTRAVENOUS EVERY 6 HOURS PRN
Status: DISCONTINUED | OUTPATIENT
Start: 2025-03-31 | End: 2025-04-09 | Stop reason: HOSPADM

## 2025-03-31 RX ORDER — ENOXAPARIN SODIUM 100 MG/ML
40 INJECTION SUBCUTANEOUS EVERY 24 HOURS
Status: DISCONTINUED | OUTPATIENT
Start: 2025-03-31 | End: 2025-04-09 | Stop reason: HOSPADM

## 2025-03-31 RX ADMIN — PREGABALIN 150 MG: 75 CAPSULE ORAL at 09:03

## 2025-03-31 RX ADMIN — ACETAMINOPHEN 1000 MG: 500 TABLET ORAL at 04:03

## 2025-03-31 RX ADMIN — INSULIN ASPART 7 UNITS: 100 INJECTION, SOLUTION INTRAVENOUS; SUBCUTANEOUS at 05:03

## 2025-03-31 RX ADMIN — CEFEPIME 2 G: 2 INJECTION, POWDER, FOR SOLUTION INTRAVENOUS at 01:03

## 2025-03-31 RX ADMIN — ONDANSETRON 8 MG: 2 INJECTION INTRAMUSCULAR; INTRAVENOUS at 08:03

## 2025-03-31 RX ADMIN — INSULIN GLARGINE 12 UNITS: 100 INJECTION, SOLUTION SUBCUTANEOUS at 09:03

## 2025-03-31 RX ADMIN — CEFEPIME 1 G: 1 INJECTION, POWDER, FOR SOLUTION INTRAMUSCULAR; INTRAVENOUS at 09:03

## 2025-03-31 RX ADMIN — ENOXAPARIN SODIUM 40 MG: 40 INJECTION SUBCUTANEOUS at 05:03

## 2025-03-31 RX ADMIN — INSULIN HUMAN 6 UNITS: 100 INJECTION, SOLUTION PARENTERAL at 02:03

## 2025-03-31 RX ADMIN — SODIUM CHLORIDE 2000 MG: 9 INJECTION, SOLUTION INTRAVENOUS at 04:03

## 2025-03-31 RX ADMIN — IPRATROPIUM BROMIDE AND ALBUTEROL SULFATE 3 ML: .5; 3 SOLUTION RESPIRATORY (INHALATION) at 02:03

## 2025-03-31 RX ADMIN — GUAIFENESIN AND DEXTROMETHORPHAN 10 ML: 100; 10 SYRUP ORAL at 08:03

## 2025-03-31 RX ADMIN — SENNOSIDES AND DOCUSATE SODIUM 1 TABLET: 50; 8.6 TABLET ORAL at 09:03

## 2025-03-31 RX ADMIN — NIFEDIPINE 30 MG: 30 TABLET, FILM COATED, EXTENDED RELEASE ORAL at 09:03

## 2025-03-31 RX ADMIN — INSULIN ASPART 3 UNITS: 100 INJECTION, SOLUTION INTRAVENOUS; SUBCUTANEOUS at 09:03

## 2025-03-31 NOTE — ED PROVIDER NOTES
Encounter Date: 3/31/2025       History     Chief Complaint   Patient presents with    Shortness of Breath     SOB with N/V/D and cough. 89% on RA.      80-year-old female presents emergency department with her daughter.  Patient has been ill for the past couple of days with upper respiratory congestion and cough productive of a yellowish sputum.  Patient has been complaining of chills and has felt warm to her daughter.  She checked her oxygen saturation this morning and it was in the high 80s, so she increased her usual oxygen from 2 L nasal cannula to 3 L nasal cannula.  Patient then had an episode where she thought someone was sitting on a bed who was not there, a daughter called 911.  Patient is also complaining of dysuria.      The history is provided by the patient.     Review of patient's allergies indicates:   Allergen Reactions    Bleach (sodium hypochlorite) Shortness Of Breath    Nitrofurantoin macrocrystalline Anaphylaxis    Lipitor [atorvastatin] Diarrhea and Rash    Nsaids (non-steroidal anti-inflammatory drug)      Tolerates aspirin      Pcn [penicillins]     Statins-hmg-coa reductase inhibitors     Toradol [ketorolac]      Past Medical History:   Diagnosis Date    Arthritis     Back pain     Cancer     ovarian    Cervical cancer     Coronary artery disease     Depression     Diabetes mellitus     Fibromyalgia     Heart attack     History of MI (myocardial infarction)     Hyperlipidemia     Hypertension     Lupus     Stroke     slight left sided weakness     Past Surgical History:   Procedure Laterality Date    APPENDECTOMY       SECTION      2    CORONARY ANGIOGRAPHY N/A 2022    Procedure: ANGIOGRAM, CORONARY ARTERY;  Surgeon: Jose L Méndez MD;  Location: [facility] CATH LAB;  Service: Cardiology;  Laterality: N/A;    HYSTERECTOMY      with USO for cervical cancer    INJECTION OF ANESTHETIC AGENT AROUND NERVE Bilateral 2021    Procedure: BLOCK, NERVE, SYMPATHIC;  Surgeon: Holden CAMPOS  MD Randall;  Location: [facility] PAIN T;  Service: Pain Management;  Laterality: Bilateral;    INJECTION OF ANESTHETIC AGENT AROUND NERVE N/A 08/25/2021    Procedure: BLOCK, NERVE, SYMPATHETIC  need consent;  Surgeon: Holden Pereira MD;  Location: [facility] PAIN MGT;  Service: Pain Management;  Laterality: N/A;    YARED QUIGLEYAL,SWALLOW FUNCTION,CINE/VIDEO RECORD  09/09/2021         TONSILLECTOMY      TRIAL OF SPINAL CORD NERVE STIMULATOR N/A 3/8/2023    Procedure: LUMBAR SPINAL CORD STIMULATOR TRIAL NEVRO REP PATIENT STATES SHE NO LONGER TAKES PLAVIX;  Surgeon: Holden Pereira MD;  Location: [facility];  Service: Pain Management;  Laterality: N/A;     Family History   Problem Relation Name Age of Onset    COPD Mother      Lupus Mother      Hernia Mother      Uterine cancer Mother          vs cervical cancer    Ovarian cancer Mother      Diabetes Father      Coronary artery disease Father      Colon cancer Maternal Grandmother          in her 50's     Social History[1]  Review of Systems   Constitutional:  Positive for chills, fatigue and fever.   HENT:  Positive for congestion. Negative for sore throat.    Respiratory:  Positive for cough and shortness of breath.    Cardiovascular:  Negative for chest pain.   Gastrointestinal:  Negative for diarrhea and nausea.   Genitourinary:  Positive for dysuria and frequency.   Musculoskeletal:  Negative for back pain.   Skin:  Negative for color change, rash and wound.   Neurological:  Positive for weakness.   Hematological:  Does not bruise/bleed easily.   Psychiatric/Behavioral:  Positive for hallucinations.    All other systems reviewed and are negative.      Physical Exam     Initial Vitals [03/31/25 1040]   BP Pulse Resp Temp SpO2   (!) 151/67 95 (!) 28 (!) 102.3 °F (39.1 °C) 99 %      MAP       --         Physical Exam    Nursing note and vitals reviewed.  Constitutional: She appears well-developed and well-nourished. No distress.   HENT:   Head: Normocephalic and  atraumatic.   Right Ear: External ear normal.   Left Ear: External ear normal.   Eyes: Conjunctivae and EOM are normal. Pupils are equal, round, and reactive to light. Right eye exhibits no discharge. Left eye exhibits no discharge. No scleral icterus.   Neck: Neck supple.   Normal range of motion.  Cardiovascular:  Regular rhythm and normal heart sounds.     Exam reveals no gallop and no friction rub.       No murmur heard.  Tachycardic, regular with rate 110   Pulmonary/Chest: No stridor. She is in respiratory distress. She has wheezes. She has rhonchi. She has rales.   Abdominal muscle use with breathing, accessory muscle use   Abdominal: Abdomen is soft. Bowel sounds are normal. She exhibits no distension and no mass. There is no abdominal tenderness. There is no rebound and no guarding.   Musculoskeletal:         General: Edema present. Normal range of motion.      Cervical back: Normal range of motion and neck supple.      Comments: Mild peripheral edema in lower extremities     Neurological: She is alert. She has normal strength. No cranial nerve deficit or sensory deficit. GCS score is 15. GCS eye subscore is 4. GCS verbal subscore is 5. GCS motor subscore is 6.   Oriented to person place, recognizes daughter, knows she is in the hospital   Skin: Skin is warm and dry. Capillary refill takes less than 2 seconds. There is pallor.   Psychiatric: She has a normal mood and affect. Her behavior is normal. Judgment and thought content normal.         ED Course   Procedures  Labs Reviewed   COMPREHENSIVE METABOLIC PANEL - Abnormal       Result Value    Sodium 134 (*)     Potassium 4.4      Chloride 101      CO2 21 (*)     Glucose 328 (*)     BUN 21      Creatinine 1.4      Calcium 9.1      Protein Total 7.3      Albumin 3.3 (*)     Bilirubin Total 0.5      ALP 71      AST 19      ALT 15      Anion Gap 12      eGFR 38 (*)    URINALYSIS, REFLEX TO URINE CULTURE - Abnormal    Color, UA Colorless (*)     Appearance, UA  Clear      pH, UA 6.0      Spec Grav UA 1.010      Protein, UA Negative      Glucose, UA Trace (*)     Ketones, UA Negative      Bilirubin, UA Negative      Blood, UA Negative      Nitrites, UA Negative      Urobilinogen, UA Negative      Leukocyte Esterase, UA Negative     CBC WITH DIFFERENTIAL - Abnormal    WBC 32.25 (*)     RBC 3.73 (*)     HGB 10.4 (*)     HCT 32.7 (*)     MCV 88      MCH 27.9      MCHC 31.8 (*)     RDW 16.1 (*)     Platelet Count 376      MPV 12.2      Nucleated RBC 0      Neut % 87.7 (*)     Lymph % 7.0 (*)     Mono % 3.9 (*)     Eos % 0.0      Basophil % 0.3      Imm Grans % 1.1 (*)     Neut # 28.26 (*)     Lymph # 2.25      Mono # 1.26 (*)     Eos # 0.01      Baso # 0.10      Imm Grans # 0.37 (*)     Narrative:     This is an appended report.  These results have been appended to a previously verified report.   MAGNESIUM - Normal    Magnesium  1.7     PHOSPHORUS - Normal    Phosphorus Level 4.2     CULTURE, BLOOD   CULTURE, BLOOD   CBC W/ AUTO DIFFERENTIAL    Narrative:     The following orders were created for panel order CBC auto differential.  Procedure                               Abnormality         Status                     ---------                               -----------         ------                     CBC with Differential[[id number]]       Abnormal            Final result                 Please view results for these tests on the individual orders.   MAGNESIUM    Magnesium        PHOSPHORUS    Phosphorus Level       MORPHOLOGY    Platelet Estimate Appears Normal      Anisocytosis Slight      Toxic Gran Present     ISTAT LACTATE    POC Lactate 1.70      Sample VENOUS       EKG Readings: (Independently Interpreted)   Initial Reading: No STEMI.   Sinus tachycardia, nonspecific ST abnormality       Imaging Results               X-Ray Chest 1 View (Final result)  Result time 03/31/25 14:05:48      Final result by Karon Pennington MD (03/31/25 14:05:48)                    Impression:      Abnormal opacities left perihilar and right upper lobe opacities which can be seen with multilobar infection in this patient with provided history of sepsis.  Recommend follow-up to resolution to exclude the possibility of an underlying lesion, particularly at the left hilum.    This report was flagged in Epic as abnormal.      Electronically signed by: Karon Pennington  Date:    03/31/2025  Time:    14:05               Narrative:    EXAMINATION:  XR CHEST 1 VIEW    CLINICAL HISTORY:  Sepsis;    TECHNIQUE:  Single frontal view of the chest was performed.    COMPARISON:  08/23/2024    FINDINGS:  Lungs are well expanded.  Abnormal ground-glass opacities right upper lobe.  Abnormal patchy left perihilar opacities.  No definite pleural effusion.    Cardiac silhouette is mildly enlarged, stable.  Calcified plaque lines arch                                    X-Rays:   Independently Interpreted Readings:   Chest X-Ray: There is an infiltrate in the Left hilum. There is a lung mass present in the RUL. Multilobar pneumonia     Medications   insulin regular injection 6 Units 0.06 mL (has no administration in time range)   ceFEPIme injection 2 g (2 g Intravenous Given 3/31/25 1309)   albuterol-ipratropium 2.5 mg-0.5 mg/3 mL nebulizer solution 3 mL (3 mLs Nebulization Given 3/31/25 1402)     Medical Decision Making  Amount and/or Complexity of Data Reviewed  Labs: ordered.  Radiology: ordered.    Risk  OTC drugs.  Prescription drug management.               ED Course as of 03/31/25 1433   Mon Mar 31, 2025   1242 Patient with difficult access, 22 gauge established and all labs including cultures obtained upon arrival.  Advanced access team at the bedside placing additional access at this time.  Will initiate cefepime as expeditiously as possible when this is complete.  Suspect source is likely pneumonia, however patient is also complaining of dysuria.  Cefepime will cover those concerns.  [LF]      ED Course User  "Index  [LF] Eden Robison MD                           Clinical Impression:   ***Please document a Clinical Impression and click the "Refresh" button to refresh your note and automatically pull in before signing.***                  [1]   Social History  Tobacco Use    Smoking status: Former     Current packs/day: 0.00     Types: Cigarettes     Quit date: 2020     Years since quittin.4     Passive exposure: Past    Smokeless tobacco: Never   Substance Use Topics    Alcohol use: Not Currently     Comment: occasionally    Drug use: Yes     Types: Hydrocodone     Comment: three times a day     "

## 2025-03-31 NOTE — PROGRESS NOTES
Pharmacokinetic Initial Assessment: IV Vancomycin    Assessment/Plan:    Initiate intravenous vancomycin with loading dose of 2000 mg once followed by a maintenance dose of vancomycin 1250mg IV every 24 hours  Desired empiric serum trough concentration is 15 to 20 mcg/mL  Draw vancomycin trough level 30 min prior to third dose on 4/2/25 at approximately 1530  Pharmacy will continue to follow and monitor vancomycin.      Please contact pharmacy at extension [phone] with any questions regarding this assessment.     Thank you for the consult,   Anthony Barcenas       Patient brief summary:  Indira Waters is a 80 y.o. female initiated on antimicrobial therapy with IV Vancomycin for treatment of suspected  Pneumonia    Drug Allergies:   Review of patient's allergies indicates:   Allergen Reactions    Bleach (sodium hypochlorite) Shortness Of Breath    Nitrofurantoin macrocrystalline Anaphylaxis    Pcn [penicillins] Shortness Of Breath    Lipitor [atorvastatin] Diarrhea and Rash    Nsaids (non-steroidal anti-inflammatory drug)      Tolerates aspirin      Statins-hmg-coa reductase inhibitors     Toradol [ketorolac]        Actual Body Weight:   98 kg    Renal Function:   Estimated Creatinine Clearance: 34.9 mL/min (based on SCr of 1.4 mg/dL).,     Dialysis Method (if applicable):  N/A    CBC (last 72 hours):  Recent Labs   Lab Result Units 03/31/25  1156   WBC K/uL 32.25*   HGB gm/dL 10.4*   HCT % 32.7*   Platelet Count K/uL 376   Lymph % % 7.0*   Mono % % 3.9*   Eos % % 0.0   Basophil % % 0.3       Metabolic Panel (last 72 hours):  Recent Labs   Lab Result Units 03/31/25  1307   Sodium mmol/L 134*   Potassium mmol/L 4.4   Chloride mmol/L 101   CO2 mmol/L 21*   Glucose mg/dL 328*   Glucose, UA  Trace*   BUN mg/dL 21   Creatinine mg/dL 1.4   Albumin g/dL 3.3*   Bilirubin Total mg/dL 0.5   ALP unit/L 71   AST unit/L 19   ALT unit/L 15   Magnesium  mg/dL 1.7   Phosphorus Level mg/dL 4.2       Drug levels (last 3  "results):  No results for input(s): "VANCOMYCINRA", "VANCORANDOM", "VANCOMYCINPE", "VANCOPEAK", "VANCOMYCINTR", "VANCOTROUGH" in the last 72 hours.    Microbiologic Results:  Microbiology Results (last 7 days)       Procedure Component Value Units Date/Time    Culture, Respiratory with Gram Stain [[id number]]     Order Status: Sent Specimen: Respiratory     Blood culture x two cultures. Draw prior to antibiotics [[id number]] Collected: 03/31/25 1307    Order Status: Sent Specimen: Blood from Peripheral, Upper Arm, Right Updated: 03/31/25 1321    Blood culture x two cultures. Draw prior to antibiotics [[id number]] Collected: 03/31/25 1155    Order Status: Sent Specimen: Blood from Peripheral, Wrist, Right Updated: 03/31/25 1206            "

## 2025-04-01 LAB
ABSOLUTE NEUTROPHIL MANUAL (OHS): 24.7 K/UL
ALBUMIN SERPL BCP-MCNC: 2.9 G/DL (ref 3.5–5.2)
ALP SERPL-CCNC: 63 UNIT/L (ref 40–150)
ALT SERPL W/O P-5'-P-CCNC: 13 UNIT/L (ref 10–44)
ANION GAP (OHS): 11 MMOL/L (ref 8–16)
ANISOCYTOSIS BLD QL SMEAR: SLIGHT
AST SERPL-CCNC: 10 UNIT/L (ref 11–45)
BILIRUB SERPL-MCNC: 0.4 MG/DL (ref 0.1–1)
BUN SERPL-MCNC: 27 MG/DL (ref 8–23)
CALCIUM SERPL-MCNC: 9.2 MG/DL (ref 8.7–10.5)
CHLORIDE SERPL-SCNC: 102 MMOL/L (ref 95–110)
CO2 SERPL-SCNC: 21 MMOL/L (ref 23–29)
CREAT SERPL-MCNC: 1.5 MG/DL (ref 0.5–1.4)
EAG (OHS): 171 MG/DL (ref 68–131)
ERYTHROCYTE [DISTWIDTH] IN BLOOD BY AUTOMATED COUNT: 16 % (ref 11.5–14.5)
GFR SERPLBLD CREATININE-BSD FMLA CKD-EPI: 35 ML/MIN/1.73/M2
GLUCOSE SERPL-MCNC: 494 MG/DL (ref 70–110)
HBA1C MFR BLD: 7.6 % (ref 4–5.6)
HCT VFR BLD AUTO: 27.7 % (ref 37–48.5)
HGB BLD-MCNC: 8.8 GM/DL (ref 12–16)
LYMPHOCYTES NFR BLD MANUAL: 8 % (ref 18–48)
MAGNESIUM SERPL-MCNC: 2.1 MG/DL (ref 1.6–2.6)
MCH RBC QN AUTO: 27.4 PG (ref 27–31)
MCHC RBC AUTO-ENTMCNC: 31.8 G/DL (ref 32–36)
MCV RBC AUTO: 86 FL (ref 82–98)
MONOCYTES NFR BLD MANUAL: 5 % (ref 4–15)
NEUTROPHILS NFR BLD MANUAL: 87 % (ref 38–73)
NUCLEATED RBC (/100WBC) (OHS): 0 /100 WBC
OVALOCYTES BLD QL SMEAR: ABNORMAL
PLATELET # BLD AUTO: 294 K/UL (ref 150–450)
PLATELET BLD QL SMEAR: ABNORMAL
PMV BLD AUTO: 11.4 FL (ref 9.2–12.9)
POCT GLUCOSE: 290 MG/DL (ref 70–110)
POCT GLUCOSE: 315 MG/DL (ref 70–110)
POCT GLUCOSE: 383 MG/DL (ref 70–110)
POCT GLUCOSE: >500 MG/DL (ref 70–110)
POTASSIUM SERPL-SCNC: 4.6 MMOL/L (ref 3.5–5.1)
PROT SERPL-MCNC: 6.7 GM/DL (ref 6–8.4)
RBC # BLD AUTO: 3.21 M/UL (ref 4–5.4)
SCHISTOCYTES BLD QL SMEAR: ABNORMAL
SODIUM SERPL-SCNC: 134 MMOL/L (ref 136–145)
WBC # BLD AUTO: 28.34 K/UL (ref 3.9–12.7)

## 2025-04-01 PROCEDURE — 63600175 PHARM REV CODE 636 W HCPCS: Performed by: NURSE PRACTITIONER

## 2025-04-01 PROCEDURE — 27000221 HC OXYGEN, UP TO 24 HOURS

## 2025-04-01 PROCEDURE — 63600175 PHARM REV CODE 636 W HCPCS: Performed by: HOSPITALIST

## 2025-04-01 PROCEDURE — 36415 COLL VENOUS BLD VENIPUNCTURE: CPT | Performed by: NURSE PRACTITIONER

## 2025-04-01 PROCEDURE — 94799 UNLISTED PULMONARY SVC/PX: CPT | Mod: XB

## 2025-04-01 PROCEDURE — 25000242 PHARM REV CODE 250 ALT 637 W/ HCPCS: Performed by: HOSPITALIST

## 2025-04-01 PROCEDURE — 25000003 PHARM REV CODE 250: Performed by: NURSE PRACTITIONER

## 2025-04-01 PROCEDURE — 83036 HEMOGLOBIN GLYCOSYLATED A1C: CPT | Performed by: HOSPITALIST

## 2025-04-01 PROCEDURE — 25000242 PHARM REV CODE 250 ALT 637 W/ HCPCS: Performed by: NURSE PRACTITIONER

## 2025-04-01 PROCEDURE — 21400001 HC TELEMETRY ROOM

## 2025-04-01 PROCEDURE — 25000003 PHARM REV CODE 250: Performed by: HOSPITALIST

## 2025-04-01 PROCEDURE — 83735 ASSAY OF MAGNESIUM: CPT | Performed by: NURSE PRACTITIONER

## 2025-04-01 PROCEDURE — 94640 AIRWAY INHALATION TREATMENT: CPT

## 2025-04-01 PROCEDURE — 94761 N-INVAS EAR/PLS OXIMETRY MLT: CPT

## 2025-04-01 PROCEDURE — 94664 DEMO&/EVAL PT USE INHALER: CPT

## 2025-04-01 PROCEDURE — 99900035 HC TECH TIME PER 15 MIN (STAT)

## 2025-04-01 PROCEDURE — 85007 BL SMEAR W/DIFF WBC COUNT: CPT | Performed by: NURSE PRACTITIONER

## 2025-04-01 PROCEDURE — 80053 COMPREHEN METABOLIC PANEL: CPT | Performed by: NURSE PRACTITIONER

## 2025-04-01 RX ORDER — DIPHENHYDRAMINE HYDROCHLORIDE 50 MG/ML
25 INJECTION, SOLUTION INTRAMUSCULAR; INTRAVENOUS EVERY 6 HOURS PRN
Status: DISCONTINUED | OUTPATIENT
Start: 2025-04-01 | End: 2025-04-09 | Stop reason: HOSPADM

## 2025-04-01 RX ORDER — GLUCAGON 1 MG
1 KIT INJECTION
Status: DISCONTINUED | OUTPATIENT
Start: 2025-04-01 | End: 2025-04-09 | Stop reason: HOSPADM

## 2025-04-01 RX ORDER — INSULIN GLARGINE 100 [IU]/ML
26 INJECTION, SOLUTION SUBCUTANEOUS 2 TIMES DAILY
Status: DISCONTINUED | OUTPATIENT
Start: 2025-04-01 | End: 2025-04-07

## 2025-04-01 RX ORDER — INSULIN ASPART 100 [IU]/ML
10 INJECTION, SOLUTION INTRAVENOUS; SUBCUTANEOUS
Status: DISCONTINUED | OUTPATIENT
Start: 2025-04-01 | End: 2025-04-07

## 2025-04-01 RX ORDER — IBUPROFEN 200 MG
16 TABLET ORAL
Status: DISCONTINUED | OUTPATIENT
Start: 2025-04-01 | End: 2025-04-09 | Stop reason: HOSPADM

## 2025-04-01 RX ORDER — IBUPROFEN 200 MG
24 TABLET ORAL
Status: DISCONTINUED | OUTPATIENT
Start: 2025-04-01 | End: 2025-04-09 | Stop reason: HOSPADM

## 2025-04-01 RX ORDER — CEFTRIAXONE 1 G/1
1 INJECTION, POWDER, FOR SOLUTION INTRAMUSCULAR; INTRAVENOUS
Status: DISCONTINUED | OUTPATIENT
Start: 2025-04-01 | End: 2025-04-07

## 2025-04-01 RX ORDER — IPRATROPIUM BROMIDE AND ALBUTEROL SULFATE 2.5; .5 MG/3ML; MG/3ML
3 SOLUTION RESPIRATORY (INHALATION)
Status: DISCONTINUED | OUTPATIENT
Start: 2025-04-01 | End: 2025-04-09 | Stop reason: HOSPADM

## 2025-04-01 RX ADMIN — CEFEPIME 1 G: 1 INJECTION, POWDER, FOR SOLUTION INTRAMUSCULAR; INTRAVENOUS at 06:04

## 2025-04-01 RX ADMIN — IPRATROPIUM BROMIDE AND ALBUTEROL SULFATE 3 ML: 2.5; .5 SOLUTION RESPIRATORY (INHALATION) at 05:04

## 2025-04-01 RX ADMIN — INSULIN GLARGINE 26 UNITS: 100 INJECTION, SOLUTION SUBCUTANEOUS at 08:04

## 2025-04-01 RX ADMIN — METOPROLOL SUCCINATE ER TABLETS 100 MG: 50 TABLET, FILM COATED, EXTENDED RELEASE ORAL at 08:04

## 2025-04-01 RX ADMIN — METHOCARBAMOL 500 MG: 500 TABLET ORAL at 01:04

## 2025-04-01 RX ADMIN — GUAIFENESIN AND DEXTROMETHORPHAN 10 ML: 100; 10 SYRUP ORAL at 09:04

## 2025-04-01 RX ADMIN — INSULIN ASPART 10 UNITS: 100 INJECTION, SOLUTION INTRAVENOUS; SUBCUTANEOUS at 05:04

## 2025-04-01 RX ADMIN — NIFEDIPINE 30 MG: 30 TABLET, FILM COATED, EXTENDED RELEASE ORAL at 07:04

## 2025-04-01 RX ADMIN — INSULIN GLARGINE 26 UNITS: 100 INJECTION, SOLUTION SUBCUTANEOUS at 07:04

## 2025-04-01 RX ADMIN — SERTRALINE HYDROCHLORIDE 100 MG: 100 TABLET ORAL at 08:04

## 2025-04-01 RX ADMIN — ASPIRIN 81 MG: 81 TABLET, COATED ORAL at 08:04

## 2025-04-01 RX ADMIN — HYDROCODONE BITARTRATE AND ACETAMINOPHEN 1 TABLET: 5; 325 TABLET ORAL at 07:04

## 2025-04-01 RX ADMIN — CEFTRIAXONE 1 G: 1 INJECTION, POWDER, FOR SOLUTION INTRAMUSCULAR; INTRAVENOUS at 09:04

## 2025-04-01 RX ADMIN — HYDROCODONE BITARTRATE AND ACETAMINOPHEN 1 TABLET: 5; 325 TABLET ORAL at 08:04

## 2025-04-01 RX ADMIN — PREGABALIN 150 MG: 75 CAPSULE ORAL at 08:04

## 2025-04-01 RX ADMIN — INSULIN ASPART 10 UNITS: 100 INJECTION, SOLUTION INTRAVENOUS; SUBCUTANEOUS at 01:04

## 2025-04-01 RX ADMIN — IPRATROPIUM BROMIDE AND ALBUTEROL SULFATE 3 ML: 2.5; .5 SOLUTION RESPIRATORY (INHALATION) at 12:04

## 2025-04-01 RX ADMIN — INSULIN ASPART 10 UNITS: 100 INJECTION, SOLUTION INTRAVENOUS; SUBCUTANEOUS at 08:04

## 2025-04-01 RX ADMIN — ALLOPURINOL 300 MG: 300 TABLET ORAL at 08:04

## 2025-04-01 RX ADMIN — IPRATROPIUM BROMIDE AND ALBUTEROL SULFATE 3 ML: 2.5; .5 SOLUTION RESPIRATORY (INHALATION) at 04:04

## 2025-04-01 RX ADMIN — SENNOSIDES AND DOCUSATE SODIUM 1 TABLET: 50; 8.6 TABLET ORAL at 07:04

## 2025-04-01 RX ADMIN — SENNOSIDES AND DOCUSATE SODIUM 1 TABLET: 50; 8.6 TABLET ORAL at 08:04

## 2025-04-01 RX ADMIN — PREGABALIN 150 MG: 75 CAPSULE ORAL at 07:04

## 2025-04-01 RX ADMIN — ENOXAPARIN SODIUM 40 MG: 40 INJECTION SUBCUTANEOUS at 05:04

## 2025-04-01 RX ADMIN — METHOCARBAMOL 500 MG: 500 TABLET ORAL at 08:04

## 2025-04-01 RX ADMIN — LOSARTAN POTASSIUM 50 MG: 50 TABLET, FILM COATED ORAL at 08:04

## 2025-04-01 RX ADMIN — HYDROCODONE BITARTRATE AND ACETAMINOPHEN 1 TABLET: 5; 325 TABLET ORAL at 01:04

## 2025-04-01 RX ADMIN — AZITHROMYCIN MONOHYDRATE 500 MG: 500 INJECTION, POWDER, LYOPHILIZED, FOR SOLUTION INTRAVENOUS at 10:04

## 2025-04-01 RX ADMIN — IPRATROPIUM BROMIDE AND ALBUTEROL SULFATE 3 ML: 2.5; .5 SOLUTION RESPIRATORY (INHALATION) at 07:04

## 2025-04-01 RX ADMIN — DIPHENHYDRAMINE HYDROCHLORIDE 25 MG: 50 INJECTION INTRAMUSCULAR; INTRAVENOUS at 10:04

## 2025-04-01 RX ADMIN — NIFEDIPINE 30 MG: 30 TABLET, FILM COATED, EXTENDED RELEASE ORAL at 08:04

## 2025-04-01 NOTE — HPI
HPi by Rina Blanc NP:  Ms. Waters is a 80 YOF with PMHx of HFrEF (EF 40-50% 06/2024), CAD ([facility] 05/2022 with severe 2 vessel, predominantly distal, disease), aortic atherosclerosis with tortuous aorta, HTN, HLD, DM type II, diabetic polyneuropathy,COPD, chronic respiratory insuffiencey/chronic airflow limitation on home oxygen qHS , former smoker, CKD III (baseline SCr 1.1-1.2), chronic anemia, history of CVA with residual left-sided deficits, GERD, fibromyalgia, chronic pain, osteoarthritis, anxiety/depression, and obesity.     She presents to ED via EMS with her daughter due to generally feeling unwell the last couple of days with fatigue, malaise, subjective fevers, chills, shortness of breath, productive cough, upper respiratory congestion, chest pain, tachycardia, and decreased oxygen saturation from baseline requiring increased nasal cannula supplementation. Ms. Waters also has complaints of dysuria. Daughter states that prior to ED arrival she had a hallucination of someone sitting on her bed that was not there, prompting her to activate EMS. Ms. Waters denies abdominal pain, N/V/D, constipation, hematuria, decreased UOP, changes in bowel habits or blood in stool, decreased appetite, changes in PO intake, light headiness, dizziness, or headaches.  She lives with her daughter, uses ambualtory aides as needed, and requires some assistance with ADLs.     In the ED she is hypertensive, heart rate 80-100s, saturation stable on 2-3 L nasal cannula, and T-max 102.3°.  CBC with WBC 32, H and H 10.4/33, platelets 376.  Chemistry NA 34, K 4.4, chloride 101, CO2 21, BUN 21, SCr 1.4, glucose 328.  Phosphorus 4.2, magnesium 1.7.  LFTs unremarkable.  POCT lactic 1.70.  Procalcitonin 1.34.  UA noninfectious.  Blood cultures in process.  EKG with sinus tach and inferolateral ST and T-wave abnormality similar to priors.  CXR with  abnormal opacities left perihilar and right upper lobe opacities which can be seen with  multilobar infection in this patient with provided history of sepsis.     The patient was admitted to the Hospital Medicine Service for further evaluation and management.

## 2025-04-01 NOTE — ASSESSMENT & PLAN NOTE
-chronic, BP elevated at admission, some LE edema on physical exam  -most recent TTE 06/2024 with EF 40-50% mild pulmonary HTN with PASP 45  -holding IVF resuscitation as detailed above  -continue home losartan, metoprolol, and nifedipine with hold parameters   -holding home torsemide for now >>> resume when clinically appropriate  -dose/medication adjustment as appropriate   -continue tele monitoring  -strict I&Os   -trend labs, address/replete electrolytes as indicated

## 2025-04-01 NOTE — ASSESSMENT & PLAN NOTE
-chronic  -baseline SCr 1.1-1.2  -at admission SCr 1.4  -avoid nephrotoxins and hypotension as able, renally dose medications  -trend labs, address/replete electrolytes as indicated

## 2025-04-01 NOTE — ASSESSMENT & PLAN NOTE
-history of   -[facility] 05/2022 detailed in HPI  -continue home ASA  -hold home fenofibrate and repatha for now >> resume at DC  -continue tele monitoring  -EKG PRN concerns

## 2025-04-01 NOTE — PLAN OF CARE
Problem: Adult Inpatient Plan of Care  Goal: Plan of Care Review  Outcome: Progressing  Goal: Patient-Specific Goal (Individualized)  Outcome: Progressing  Goal: Absence of Hospital-Acquired Illness or Injury  Outcome: Progressing  Goal: Optimal Comfort and Wellbeing  Outcome: Progressing  Goal: Readiness for Transition of Care  Outcome: Progressing     Problem: Infection  Goal: Absence of Infection Signs and Symptoms  Outcome: Progressing     Problem: Pneumonia  Goal: Fluid Balance  Outcome: Progressing  Goal: Resolution of Infection Signs and Symptoms  Outcome: Progressing  Goal: Effective Oxygenation and Ventilation  Outcome: Progressing     Problem: Diabetes Comorbidity  Goal: Blood Glucose Level Within Targeted Range  Outcome: Progressing

## 2025-04-01 NOTE — ASSESSMENT & PLAN NOTE
-history of   -[facility] 05/2022 detailed in HPI  -continue home ASA  -hold home fenofibrate and repatha for now >> resume at DC  -continue tele monitoring  -EKG PRN concerns

## 2025-04-01 NOTE — HOSPITAL COURSE
Patient admitted for treatment of sepsis due to multifocal pneumonia and associated severe hyperglycemia.  She had good improvement in her mental status, fever and shortness of breath and antibiotics were changed to ceftriaxone/azithromycin the following day - completed course.  She was on guaifenesin during the day for cough and a cough suppressant at night so she could rest.  PT/OT were consulted due to her poor conditioning and debility, recommended home health. Prednisone added for COPD exacerbation tx. Monitor BG. Pt SOB and wheezing improved and more stable for discharge. Daughter updated at bedside.

## 2025-04-01 NOTE — PROGRESS NOTES
Active pharmacy to dose vancomycin consult:     Patient reviewed, renal function stable, cultures reviewed, no new levels, continue current therapy; Next levels due: 4/2 at 15:30    Please contact inpatient pharmacy at [phone] with any questions.     Thank you,  Sammie Richards  04/01/2025

## 2025-04-01 NOTE — SUBJECTIVE & OBJECTIVE
Interval History: Assumed patient care, daughter at bedside.  She has a history of chronic bronchitis and has had pneumonia before.  Had pneumovax at age 70.  BG has been quite elevated.  She is no longer confused and is breathing more comfortably.  Has some pain in her left ankle.    Review of Systems   Constitutional:  Positive for chills and fever.   Respiratory:  Positive for cough and shortness of breath.    Cardiovascular:  Negative for chest pain and palpitations.     Objective:     Vital Signs (Most Recent):  Temp: 97.7 °F (36.5 °C) (04/01/25 0751)  Pulse: 92 (04/01/25 0751)  Resp: (!) 22 (04/01/25 0807)  BP: (!) 156/69 (04/01/25 0751)  SpO2: 95 % (04/01/25 0804) Vital Signs (24h Range):  Temp:  [97.7 °F (36.5 °C)-102.3 °F (39.1 °C)] 97.7 °F (36.5 °C)  Pulse:  [] 92  Resp:  [16-28] 22  SpO2:  [95 %-100 %] 95 %  BP: (130-160)/(62-71) 156/69     Weight: 98 kg (216 lb 0.8 oz)  Body mass index is 40.82 kg/m².    Intake/Output Summary (Last 24 hours) at 4/1/2025 0912  Last data filed at 4/1/2025 0040  Gross per 24 hour   Intake --   Output 800 ml   Net -800 ml         Physical Exam  Constitutional:       Appearance: She is ill-appearing.   Cardiovascular:      Rate and Rhythm: Normal rate and regular rhythm.      Heart sounds: Normal heart sounds. No murmur heard.     No gallop.   Pulmonary:      Effort: Pulmonary effort is normal.      Breath sounds: Wheezing, rhonchi and rales present.   Abdominal:      General: Bowel sounds are normal.      Palpations: Abdomen is soft.   Skin:     General: Skin is warm and dry.   Neurological:      General: No focal deficit present.      Mental Status: She is alert.   Psychiatric:         Mood and Affect: Mood normal.         Behavior: Behavior normal.               Significant Labs: All pertinent labs within the past 24 hours have been reviewed.    Significant Imaging: I have reviewed all pertinent imaging results/findings within the past 24 hours.   English

## 2025-04-01 NOTE — PLAN OF CARE
Problem: Adult Inpatient Plan of Care  Goal: Plan of Care Review  Outcome: Progressing  Goal: Patient-Specific Goal (Individualized)  Outcome: Progressing  Goal: Absence of Hospital-Acquired Illness or Injury  Outcome: Progressing  Goal: Optimal Comfort and Wellbeing  Outcome: Progressing  Goal: Readiness for Transition of Care  Outcome: Progressing     Problem: Infection  Goal: Absence of Infection Signs and Symptoms  Outcome: Progressing     Problem: Pneumonia  Goal: Fluid Balance  Outcome: Progressing  Goal: Resolution of Infection Signs and Symptoms  Outcome: Progressing  Goal: Effective Oxygenation and Ventilation  Outcome: Progressing     Respiratory therapy treatments as ordered. PRN medications given by request. CBG monitored and SSI given as ordered.

## 2025-04-01 NOTE — ASSESSMENT & PLAN NOTE
-admitted due to community-acquired pneumonia, multilobular, in setting of known COPD, chronic airflow limitation, chronic respiratory failure on home oxygen, in former smoker  -at admission hypertensive otherwise hemoglobin stable and saturation stable on nasal cannula supplementation; T-max 102.3°   -ED workup detailed above however notable for WBC 32 and procalcitonin 1.34  -initiated on cefepime vancomycin in ED >>> continue   -tailor/de-escalate as appropriate   -hold IV fluids at current D/T normal lactic and CHF history   -monitor for steroid need however wheezing minimal   -encourage PO intake as tolerated  -follow blood cultures   -sputum culture pending collection   -Mando PRN   -incentive spirometer and flutter valve for pulmonary toileting   -PRN supportive care as indicated   -continue oxygen supplementation  -strict I&Os  -trend labs, address/replete electrolytes as indicated

## 2025-04-01 NOTE — PLAN OF CARE
Medicare Message     Important Message from Medicare regarding Discharge Appeal Rights Explained to patient/caregiver; Signed/date by patient/caregiver   Date IMM was signed 3/31/2025   Time IMM was signed 0737

## 2025-04-01 NOTE — ASSESSMENT & PLAN NOTE
-history of   -[facility] 05/2022 detailed in HPI  -continue home ASA  -hold home fenofibrate and repatha for now >> resume at DC  -continue tele monitoring  -EKG PRN concerns

## 2025-04-01 NOTE — ASSESSMENT & PLAN NOTE
-history of   -continue home ASA  -hold home fenofibrate and repatha for now >> resume at DC  -fall precautions

## 2025-04-01 NOTE — PROGRESS NOTES
[facility]urg [address] Medicine  Progress Note    Patient Name: Indira Waters  MRN: [id number]  Patient Class: IP- Inpatient   Admission Date: 3/31/2025  Length of Stay: 1 days  Attending Physician: Annamarie Scott MD  Primary Care Provider: Chun Zapata MD        Subjective     Principal Problem:CAP (community acquired pneumonia)        HPI:  HPi by Rina Blanc NP:  Ms. Waters is a 80 YOF with PMHx of HFrEF (EF 40-50% 06/2024), CAD ([facility] 05/2022 with severe 2 vessel, predominantly distal, disease), aortic atherosclerosis with tortuous aorta, HTN, HLD, DM type II, diabetic polyneuropathy,COPD, chronic respiratory insuffiencey/chronic airflow limitation on home oxygen qHS , former smoker, CKD III (baseline SCr 1.1-1.2), chronic anemia, history of CVA with residual left-sided deficits, GERD, fibromyalgia, chronic pain, osteoarthritis, anxiety/depression, and obesity.     She presents to ED via EMS with her daughter due to generally feeling unwell the last couple of days with fatigue, malaise, subjective fevers, chills, shortness of breath, productive cough, upper respiratory congestion, chest pain, tachycardia, and decreased oxygen saturation from baseline requiring increased nasal cannula supplementation. Ms. Waters also has complaints of dysuria. Daughter states that prior to ED arrival she had a hallucination of someone sitting on her bed that was not there, prompting her to activate EMS. Ms. Waters denies abdominal pain, N/V/D, constipation, hematuria, decreased UOP, changes in bowel habits or blood in stool, decreased appetite, changes in PO intake, light headiness, dizziness, or headaches.  She lives with her daughter, uses ambualtory aides as needed, and requires some assistance with ADLs.     In the ED she is hypertensive, heart rate 80-100s, saturation stable on 2-3 L nasal cannula, and T-max 102.3°.  CBC with WBC 32, H and H 10.4/33, platelets 376.  Chemistry NA 34, K  4.4, chloride 101, CO2 21, BUN 21, SCr 1.4, glucose 328.  Phosphorus 4.2, magnesium 1.7.  LFTs unremarkable.  POCT lactic 1.70.  Procalcitonin 1.34.  UA noninfectious.  Blood cultures in process.  EKG with sinus tach and inferolateral ST and T-wave abnormality similar to priors.  CXR with  abnormal opacities left perihilar and right upper lobe opacities which can be seen with multilobar infection in this patient with provided history of sepsis.     The patient was admitted to the Hospital Medicine Service for further evaluation and management.     Overview/Hospital Course:  Patient admitted for treatment of sepsis due to multifocal pneumonia and associated severe hyperglycemia.  She had good improvement in her mental status, fever and shortness of breath and antibiotics were changed to ceftriaxone/azithromycin the following day.    Interval History: Assumed patient care, daughter at bedside.  She has a history of chronic bronchitis and has had pneumonia before.  Had pneumovax at age 70.  BG has been quite elevated.  She is no longer confused and is breathing more comfortably.  Has some pain in her left ankle.    Review of Systems   Constitutional:  Positive for chills and fever.   Respiratory:  Positive for cough and shortness of breath.    Cardiovascular:  Negative for chest pain and palpitations.     Objective:     Vital Signs (Most Recent):  Temp: 97.7 °F (36.5 °C) (04/01/25 0751)  Pulse: 92 (04/01/25 0751)  Resp: (!) 22 (04/01/25 0807)  BP: (!) 156/69 (04/01/25 0751)  SpO2: 95 % (04/01/25 0804) Vital Signs (24h Range):  Temp:  [97.7 °F (36.5 °C)-102.3 °F (39.1 °C)] 97.7 °F (36.5 °C)  Pulse:  [] 92  Resp:  [16-28] 22  SpO2:  [95 %-100 %] 95 %  BP: (130-160)/(62-71) 156/69     Weight: 98 kg (216 lb 0.8 oz)  Body mass index is 40.82 kg/m².    Intake/Output Summary (Last 24 hours) at 4/1/2025 0912  Last data filed at 4/1/2025 0040  Gross per 24 hour   Intake --   Output 800 ml   Net -800 ml         Physical  Exam  Constitutional:       Appearance: She is ill-appearing.   Cardiovascular:      Rate and Rhythm: Normal rate and regular rhythm.      Heart sounds: Normal heart sounds. No murmur heard.     No gallop.   Pulmonary:      Effort: Pulmonary effort is normal.      Breath sounds: Wheezing, rhonchi and rales present.   Abdominal:      General: Bowel sounds are normal.      Palpations: Abdomen is soft.   Skin:     General: Skin is warm and dry.   Neurological:      General: No focal deficit present.      Mental Status: She is alert.   Psychiatric:         Mood and Affect: Mood normal.         Behavior: Behavior normal.               Significant Labs: All pertinent labs within the past 24 hours have been reviewed.    Significant Imaging: I have reviewed all pertinent imaging results/findings within the past 24 hours.      Assessment & Plan  CAP (community acquired pneumonia); multilobular  Acute on chronic respiratory failure with hypoxia  On home oxygen therapy  COPD (chronic obstructive pulmonary disease); SUSPECTED  CAFL (chronic airflow limitation)  Chronic bronchitis  Former smoker  -admitted due to community-acquired pneumonia, multilobular, in setting of known COPD, chronic airflow limitation, chronic respiratory failure on home oxygen, in former smoker  -at admission hypertensive otherwise hemoglobin stable and saturation stable on nasal cannula supplementation; T-max 102.3°   -ED workup detailed above however notable for WBC 32 and procalcitonin 1.34  -initiated on cefepime vancomycin in ED >>> good improvement overnight, change to ceftriaxone/azithromycin   -hold IV fluids at current D/T normal lactic and CHF history   -monitor for steroid need however wheezing minimal   -encourage PO intake as tolerated  -follow blood cultures   -sputum culture pending collection   -DuoNebs PRN   -incentive spirometer and flutter valve for pulmonary toileting   -PRN supportive care as indicated   -continue oxygen  supplementation  -strict I&Os  -trend labs, address/replete electrolytes as indicated    Chronic combined systolic and diastolic congestive heart failure  Essential hypertension  -chronic, BP elevated at admission, some LE edema on physical exam  -most recent TTE 06/2024 with EF 40-50% mild pulmonary HTN with PASP 45  -holding IVF resuscitation as detailed above  -continue home losartan, metoprolol, and nifedipine with hold parameters   -holding home torsemide for now >>> resume when clinically appropriate  -dose/medication adjustment as appropriate   -continue tele monitoring  -strict I&Os   -trend labs, address/replete electrolytes as indicated    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 pectoris  Athscl heart disease of native coronary artery w/o ang pctrs  -history of   -[facility] 05/2022 detailed in HPI  -continue home ASA  -hold home fenofibrate and repatha for now >> resume at DC  -continue tele monitoring  -EKG PRN concerns     History of CVA (cerebrovascular accident)  Hemiplegia and hemiparesis following cerebral infarction affecting left non-dominant side  -history of   -continue home ASA  -hold home fenofibrate and repatha for now >> resume at DC  -fall precautions     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 disease  -chronic, not well controlled   -hemoglobin A1c 7.9/180 3/06/2025   -hold home Lantus 26 units BID and lispro 15 units with breakfast, 15 units with lunch, N 11 units with dinner plus low-dose SSI   -begin dose reduced basal and prandial insulin plus low-dose SSI  -dose/medication adjustment as appropriate   -monitor accuchecks AC/HS and PRN hypoglycemic protocol   -4/1 - change to home doses of basal insulin, increase prandial.  Persistently hyperglycemic, per daughter this is typical when she gets an infection.    Gastroesophageal reflux disease without esophagitis  -chronic  -PRN supportive care as indicated    Stage 3 chronic kidney  disease  -chronic  -baseline SCr 1.1-1.2  -at admission SCr 1.4  -avoid nephrotoxins and hypotension as able, renally dose medications  -trend labs, address/replete electrolytes as indicated    Depression, unspecified  Generalized anxiety disorder  Recurrent major depressive disorder, in remission  -chronic  -continue home sertraline  -additional PRN supportive care as indicated     Chronic pain syndrome  Fibromyalgia  -chronic  -PRN supportive care for pain  -fall precautions      VTE Risk Mitigation (From admission, onward)           Ordered     enoxaparin injection 40 mg  Daily         03/31/25 1639     IP VTE HIGH RISK PATIENT  Once         03/31/25 1639     Place sequential compression device  Until discontinued         03/31/25 1639                    Discharge Planning   KYLE:      Code Status: Full Code   Medical Readiness for Discharge Date:                            Annamarie Zuniga MD  Department of Hospital Medicine   [facility] - Med Surg ([address])

## 2025-04-01 NOTE — ASSESSMENT & PLAN NOTE
-chronic, not well controlled   -hemoglobin A1c 7.9/180 3/06/2025   -hold home Lantus 26 units BID and lispro 15 units with breakfast, 15 units with lunch, N 11 units with dinner plus low-dose SSI   -begin dose reduced basal and prandial insulin plus low-dose SSI  -dose/medication adjustment as appropriate   -monitor accuchecks AC/HS and PRN hypoglycemic protocol

## 2025-04-01 NOTE — SUBJECTIVE & OBJECTIVE
Past Medical History:   Diagnosis Date    Arthritis     Back pain     Cancer     ovarian    Cervical cancer     Coronary artery disease     Depression     Diabetes mellitus     Fibromyalgia     Heart attack     History of MI (myocardial infarction)     Hyperlipidemia     Hypertension     Lupus     Stroke     slight left sided weakness       Past Surgical History:   Procedure Laterality Date    APPENDECTOMY       SECTION      2    CORONARY ANGIOGRAPHY N/A 2022    Procedure: ANGIOGRAM, CORONARY ARTERY;  Surgeon: Jose L Méndez MD;  Location: [facility], [place], operated by [facility] CATH LAB;  Service: Cardiology;  Laterality: N/A;    HYSTERECTOMY      with USO for cervical cancer    INJECTION OF ANESTHETIC AGENT AROUND NERVE Bilateral 2021    Procedure: BLOCK, NERVE, SYMPATHIC;  Surgeon: Holden Pereira MD;  Location: [facility], [place], operated by [facility] PAIN MGT;  Service: Pain Management;  Laterality: Bilateral;    INJECTION OF ANESTHETIC AGENT AROUND NERVE N/A 2021    Procedure: BLOCK, NERVE, SYMPATHETIC  need consent;  Surgeon: Holden Pereira MD;  Location: [facility], [place], operated by [facility] PAIN MGT;  Service: Pain Management;  Laterality: N/A;    MS EVAL,SWALLOW FUNCTION,CINE/VIDEO RECORD  2021         TONSILLECTOMY      TRIAL OF SPINAL CORD NERVE STIMULATOR N/A 3/8/2023    Procedure: LUMBAR SPINAL CORD STIMULATOR TRIAL NEVRO REP PATIENT STATES SHE NO LONGER TAKES PLAVIX;  Surgeon: Holden Pereira MD;  Location: [facility], [place], operated by [facility] PAIN MGT;  Service: Pain Management;  Laterality: N/A;       Review of patient's allergies indicates:   Allergen Reactions    Bleach (sodium hypochlorite) Shortness Of Breath    Nitrofurantoin macrocrystalline Anaphylaxis    Pcn [penicillins] Shortness Of Breath    Lipitor [atorvastatin] Diarrhea and Rash    Nsaids (non-steroidal anti-inflammatory drug)      Tolerates aspirin      Statins-hmg-coa reductase inhibitors     Toradol [ketorolac]        No current facility-administered medications on file prior to encounter.     Current Outpatient Medications  "on File Prior to Encounter   Medication Sig    acetaminophen (TYLENOL) 500 MG tablet Take 2 tablets (1,000 mg total) by mouth every 8 (eight) hours as needed for Pain.    albuterol (ACCUNEB) 1.25 mg/3 mL Nebu Take 3 mLs (1.25 mg total) by nebulization every 6 (six) hours as needed (for wheezing or shortness of breath). Rescue    allopurinoL (ZYLOPRIM) 300 MG tablet Take 1 tablet (300 mg total) by mouth once daily.    aspirin (ECOTRIN) 81 MG EC tablet Take 1 tablet (81 mg total) by mouth once daily.    BD ULTRA-FINE MICRO PEN NEEDLE 32 gauge x 1/4" Ndle Use with insulin 5 times a day.    blood sugar diagnostic Strp To check BG 6 times daily, to use with insurance preferred meter    blood-glucose meter kit To check BG 6 times daily, to use with insurance preferred meter    evolocumab (REPATHA SURECLICK) 140 mg/mL PnIj Inject 1 mL (140 mg total) into the skin every 14 (fourteen) days.    fenofibrate micronized (LOFIBRA) 134 MG Cap Take 1 capsule (134 mg total) by mouth daily with breakfast.    HYDROcodone-acetaminophen (NORCO)  mg per tablet Take 1 tablet by mouth every 8 (eight) hours as needed for Pain.    insulin glargine U-100, Lantus, (LANTUS SOLOSTAR U-100 INSULIN) 100 unit/mL (3 mL) InPn pen Inject 26 Units into the skin 2 (two) times a day.    insulin lispro (HUMALOG KWIKPEN INSULIN) 100 unit/mL pen Inject 15 Units into the skin before breakfast AND 15 Units daily with lunch AND 11 Units daily with dinner or evening meal. Plus correction scale. Max TDD 50units..    ipratropium-albuteroL (COMBIVENT RESPIMAT)  mcg/actuation inhaler Inhale 1 puff into the lungs every 6 (six) hours.    lancets (TRUEPLUS LANCETS) 30 gauge Misc Check blood sugar 4 times daily    losartan (COZAAR) 50 MG tablet Take 1 tablet (50 mg total) by mouth once daily.    melatonin (MELATIN) 3 mg tablet Take 2 tablets (6 mg total) by mouth nightly as needed for Insomnia.    methocarbamoL (ROBAXIN) 500 MG Tab Take 500 mg by mouth 4 " (four) times daily.    methocarbamoL (ROBAXIN) 500 MG Tab Take 1 tablet (500 mg total) by mouth 3 (three) times daily as needed (muscle pain).    metoclopramide HCl (REGLAN) 5 MG tablet TAKE ONE TABLET BY MOUTH FOUR TIMES DAILY AS NEEDED FOR nausea prevention    metoprolol succinate (TOPROL-XL) 100 MG 24 hr tablet Take 1 tablet (100 mg total) by mouth once daily.    miconazole nitrate 2% (MICOTIN) 2 % Oint Apply topically 2 (two) times daily. For 10 days    NIFEdipine (PROCARDIA-XL) 30 MG (OSM) 24 hr tablet TAKE ONE TABLET BY MOUTH TWICE DAILY    nitroGLYCERIN (NITROSTAT) 0.4 MG SL tablet DISSOLVE 1 TABLET UNDER THE TONGUE EVERY 5 MINUTES AS NEEDED FOR CHEST PAIN. DO NOT EXCEED A TOTAL OF 3 DOSES IN 15 MINUTES.    pregabalin (LYRICA) 150 MG capsule Take 1 capsule (150 mg total) by mouth 2 (two) times daily.    senna-docusate 8.6-50 mg (PERICOLACE) 8.6-50 mg per tablet Take 1 tablet by mouth 2 (two) times daily as needed for Constipation.    sertraline (ZOLOFT) 100 MG tablet TAKE ONE TABLET BY MOUTH EVERY DAY    torsemide (DEMADEX) 20 MG Tab Take 1 tablet (20 mg total) by mouth once daily.    [DISCONTINUED] blood-glucose transmitter (DEXCOM G6 TRANSMITTER) Nicole 1 each by Misc.(Non-Drug; Combo Route) route continuous prn.     Family History       Problem Relation (Age of Onset)    COPD Mother    Colon cancer Maternal Grandmother    Coronary artery disease Father    Diabetes Father    Hernia Mother    Lupus Mother    Ovarian cancer Mother    Uterine cancer Mother          Tobacco Use    Smoking status: Former     Current packs/day: 0.00     Types: Cigarettes     Quit date: 2020     Years since quittin.4     Passive exposure: Past    Smokeless tobacco: Never   Substance and Sexual Activity    Alcohol use: Not Currently     Comment: occasionally    Drug use: Yes     Types: Hydrocodone     Comment: three times a day    Sexual activity: Not Currently     Review of Systems   Constitutional:  Positive for chills,  fatigue and fever. Negative for activity change, appetite change and diaphoresis.   HENT:  Positive for congestion. Negative for sore throat and trouble swallowing.    Respiratory:  Positive for cough, chest tightness and shortness of breath. Negative for choking and wheezing.    Cardiovascular:  Positive for chest pain and palpitations. Negative for leg swelling.   Gastrointestinal:  Negative for abdominal pain, constipation, diarrhea, nausea and vomiting.   Genitourinary:  Positive for dysuria. Negative for difficulty urinating and hematuria.   Musculoskeletal:  Positive for gait problem (chronic) and myalgias.   Neurological:  Positive for weakness. Negative for dizziness, syncope, light-headedness and headaches.     Objective:     Vital Signs (Most Recent):  Temp: 97.7 °F (36.5 °C) (03/31/25 2104)  Pulse: 87 (03/31/25 2104)  Resp: 19 (03/31/25 2104)  BP: 133/62 (03/31/25 2104)  SpO2: 96 % (03/31/25 2104) Vital Signs (24h Range):  Temp:  [97.7 °F (36.5 °C)-102.3 °F (39.1 °C)] 97.7 °F (36.5 °C)  Pulse:  [] 87  Resp:  [16-28] 19  SpO2:  [95 %-100 %] 96 %  BP: (133-160)/(62-71) 133/62     Weight: 98 kg (216 lb 0.8 oz)  Body mass index is 40.82 kg/m².     Physical Exam  Vitals and nursing note reviewed.   Constitutional:       General: She is not in acute distress.     Appearance: Normal appearance. She is well-developed. She is obese. She is ill-appearing (chronically). She is not toxic-appearing.   HENT:      Head: Normocephalic and atraumatic.      Mouth/Throat:      Dentition: Normal dentition.   Eyes:      General: Lids are normal.      Extraocular Movements: Extraocular movements intact.      Conjunctiva/sclera: Conjunctivae normal.   Cardiovascular:      Rate and Rhythm: Regular rhythm. Tachycardia present.      Heart sounds: No murmur heard.  Pulmonary:      Effort: Pulmonary effort is normal.      Breath sounds: Wheezing and rhonchi present.      Comments: Resting on nasal cannula.  No increased work  of breathing or conversational dyspnea.  Nonproductive cough during interview/exam.  Bilateral lower base rhonchi with right mid lung expiratory wheeze.  Abdominal:      General: Bowel sounds are normal. There is no distension.      Palpations: Abdomen is soft.      Tenderness: There is no abdominal tenderness.   Musculoskeletal:      Cervical back: Neck supple.      Right lower leg: Edema (1+ pitting mid shin to ankle) present.      Left lower leg: Edema (1+ pitting mid shin to ankle) present.   Skin:     General: Skin is warm and dry.      Findings: No erythema or rash.   Neurological:      Mental Status: She is alert and oriented to person, place, and time.             Significant Labs: All pertinent labs within the past 24 hours have been reviewed.  CBC:   Recent Labs   Lab 03/31/25  1156   WBC 32.25*   HGB 10.4*   HCT 32.7*        CMP:   Recent Labs   Lab 03/31/25  1307   *   K 4.4      CO2 21*   BUN 21   CREATININE 1.4   CALCIUM 9.1   ALBUMIN 3.3*   BILITOT 0.5   ALKPHOS 71   AST 19   ALT 15   ANIONGAP 12     Magnesium:   Recent Labs   Lab 03/31/25  1307   MG 1.7     Urine Studies:   Recent Labs   Lab 03/31/25  1307   COLORU Colorless*   APPEARANCEUA Clear   PHUR 6.0   SPECGRAV 1.010   PROTEINUA Negative   GLUCUA Trace*   BILIRUBINUA Negative   OCCULTUA Negative   NITRITE Negative   UROBILINOGEN Negative   LEUKOCYTESUR Negative     Significant Imaging: I have reviewed all pertinent imaging results/findings within the past 24 hours.

## 2025-04-01 NOTE — ASSESSMENT & PLAN NOTE
-history of   -[facility] 05/2022 detailed in HPI  -continue home ASA  -hold home fenofibrate and repatha for now >> resume at DC  -continue tele monitoring  -EKG PRN concerns

## 2025-04-01 NOTE — PROGRESS NOTES
Therapy with vancomycin complete and/or consult discontinued by provider.  Pharmacy will sign off, please re-consult as needed.    Thank you for the consult,    Sammie Richards  04/01/2025

## 2025-04-01 NOTE — H&P
[address] Medicine  History & Physical    Patient Name: Indira Waters  MRN: [id number]  Patient Class: IP- Inpatient  Admission Date: 3/31/2025  Attending Physician: Juanis Alejandro MD   Primary Care Provider: Chun Zapata MD    Patient information was obtained from patient, relative(s), EMS personnel, past medical records, and ER records.     Subjective:     Principal Problem:CAP (community acquired pneumonia)    Chief Complaint:   Chief Complaint   Patient presents with    Shortness of Breath     SOB with N/V/D and cough. 89% on RA.         HPI: Ms. Waters is a 80 YOF with PMHx of HFrEF (EF 40-50% 06/2024), CAD ([facility] 05/2022 with severe 2 vessel, predominantly distal, disease), aortic atherosclerosis with tortuous aorta, HTN, HLD, DM type II, diabetic polyneuropathy,COPD, chronic respiratory insuffiencey/chronic airflow limitation on home oxygen qHS , former smoker, CKD III (baseline SCr 1.1-1.2), chronic anemia, history of CVA with residual left-sided deficits, GERD, fibromyalgia, chronic pain, osteoarthritis, anxiety/depression, and obesity.     She presents to ED via EMS with her daughter due to generally feeling unwell the last couple of days with fatigue, malaise, subjective fevers, chills, shortness of breath, productive cough, upper respiratory congestion, chest pain, tachycardia, and decreased oxygen saturation from baseline requiring increased nasal cannula supplementation. Ms. Waters also has complaints of dysuria. Daughter states that prior to ED arrival she had a hallucination of someone sitting on her bed that was not there, prompting her to activate EMS. Ms. Waters denies abdominal pain, N/V/D, constipation, hematuria, decreased UOP, changes in bowel habits or blood in stool, decreased appetite, changes in PO intake, light headiness, dizziness, or headaches.  She lives with her daughter, uses ambualtory aides as needed, and requires some assistance with  ADLs.     In the ED she is hypertensive, heart rate 80-100s, saturation stable on 2-3 L nasal cannula, and T-max 102.3°.  CBC with WBC 32, H and H 10.4/33, platelets 376.  Chemistry NA 34, K 4.4, chloride 101, CO2 21, BUN 21, SCr 1.4, glucose 328.  Phosphorus 4.2, magnesium 1.7.  LFTs unremarkable.  POCT lactic 1.70.  Procalcitonin 1.34.  UA noninfectious.  Blood cultures in process.  EKG with sinus tach and inferolateral ST and T-wave abnormality similar to priors.  CXR with  abnormal opacities left perihilar and right upper lobe opacities which can be seen with multilobar infection in this patient with provided history of sepsis.     The patient was admitted to the Hospital Medicine Service for further evaluation and management.     Past Medical History:   Diagnosis Date    Arthritis     Back pain     Cancer     ovarian    Cervical cancer     Coronary artery disease     Depression     Diabetes mellitus     Fibromyalgia     Heart attack     History of MI (myocardial infarction)     Hyperlipidemia     Hypertension     Lupus     Stroke     slight left sided weakness       Past Surgical History:   Procedure Laterality Date    APPENDECTOMY       SECTION      2    CORONARY ANGIOGRAPHY N/A 2022    Procedure: ANGIOGRAM, CORONARY ARTERY;  Surgeon: Jose L Méndez MD;  Location: [facility] CATH LAB;  Service: Cardiology;  Laterality: N/A;    HYSTERECTOMY      with USO for cervical cancer    INJECTION OF ANESTHETIC AGENT AROUND NERVE Bilateral 2021    Procedure: BLOCK, NERVE, SYMPATHIC;  Surgeon: Holden Pereira MD;  Location: [facility] PAIN MGT;  Service: Pain Management;  Laterality: Bilateral;    INJECTION OF ANESTHETIC AGENT AROUND NERVE N/A 2021    Procedure: BLOCK, NERVE, SYMPATHETIC  need consent;  Surgeon: Holden Pereira MD;  Location: [facility] PAIN MGT;  Service: Pain Management;  Laterality: N/A;    YARED LINK,SWALLOW FUNCTION,CINE/VIDEO RECORD  2021         TONSILLECTOMY      TRIAL OF SPINAL  "CORD NERVE STIMULATOR N/A 3/8/2023    Procedure: LUMBAR SPINAL CORD STIMULATOR TRIAL NEVRO REP PATIENT STATES SHE NO LONGER TAKES PLAVIX;  Surgeon: Holden Pereira MD;  Location: [facility];  Service: Pain Management;  Laterality: N/A;       Review of patient's allergies indicates:   Allergen Reactions    Bleach (sodium hypochlorite) Shortness Of Breath    Nitrofurantoin macrocrystalline Anaphylaxis    Pcn [penicillins] Shortness Of Breath    Lipitor [atorvastatin] Diarrhea and Rash    Nsaids (non-steroidal anti-inflammatory drug)      Tolerates aspirin      Statins-hmg-coa reductase inhibitors     Toradol [ketorolac]        No current facility-administered medications on file prior to encounter.     Current Outpatient Medications on File Prior to Encounter   Medication Sig    acetaminophen (TYLENOL) 500 MG tablet Take 2 tablets (1,000 mg total) by mouth every 8 (eight) hours as needed for Pain.    albuterol (ACCUNEB) 1.25 mg/3 mL Nebu Take 3 mLs (1.25 mg total) by nebulization every 6 (six) hours as needed (for wheezing or shortness of breath). Rescue    allopurinoL (ZYLOPRIM) 300 MG tablet Take 1 tablet (300 mg total) by mouth once daily.    aspirin (ECOTRIN) 81 MG EC tablet Take 1 tablet (81 mg total) by mouth once daily.    BD ULTRA-FINE MICRO PEN NEEDLE 32 gauge x 1/4" Ndle Use with insulin 5 times a day.    blood sugar diagnostic Strp To check BG 6 times daily, to use with insurance preferred meter    blood-glucose meter kit To check BG 6 times daily, to use with insurance preferred meter    evolocumab (REPATHA SURECLICK) 140 mg/mL PnIj Inject 1 mL (140 mg total) into the skin every 14 (fourteen) days.    fenofibrate micronized (LOFIBRA) 134 MG Cap Take 1 capsule (134 mg total) by mouth daily with breakfast.    HYDROcodone-acetaminophen (NORCO)  mg per tablet Take 1 tablet by mouth every 8 (eight) hours as needed for Pain.    insulin glargine U-100, Lantus, (LANTUS SOLOSTAR U-100 INSULIN) 100 " unit/mL (3 mL) InPn pen Inject 26 Units into the skin 2 (two) times a day.    insulin lispro (HUMALOG KWIKPEN INSULIN) 100 unit/mL pen Inject 15 Units into the skin before breakfast AND 15 Units daily with lunch AND 11 Units daily with dinner or evening meal. Plus correction scale. Max TDD 50units..    ipratropium-albuteroL (COMBIVENT RESPIMAT)  mcg/actuation inhaler Inhale 1 puff into the lungs every 6 (six) hours.    lancets (TRUEPLUS LANCETS) 30 gauge Misc Check blood sugar 4 times daily    losartan (COZAAR) 50 MG tablet Take 1 tablet (50 mg total) by mouth once daily.    melatonin (MELATIN) 3 mg tablet Take 2 tablets (6 mg total) by mouth nightly as needed for Insomnia.    methocarbamoL (ROBAXIN) 500 MG Tab Take 500 mg by mouth 4 (four) times daily.    methocarbamoL (ROBAXIN) 500 MG Tab Take 1 tablet (500 mg total) by mouth 3 (three) times daily as needed (muscle pain).    metoclopramide HCl (REGLAN) 5 MG tablet TAKE ONE TABLET BY MOUTH FOUR TIMES DAILY AS NEEDED FOR nausea prevention    metoprolol succinate (TOPROL-XL) 100 MG 24 hr tablet Take 1 tablet (100 mg total) by mouth once daily.    miconazole nitrate 2% (MICOTIN) 2 % Oint Apply topically 2 (two) times daily. For 10 days    NIFEdipine (PROCARDIA-XL) 30 MG (OSM) 24 hr tablet TAKE ONE TABLET BY MOUTH TWICE DAILY    nitroGLYCERIN (NITROSTAT) 0.4 MG SL tablet DISSOLVE 1 TABLET UNDER THE TONGUE EVERY 5 MINUTES AS NEEDED FOR CHEST PAIN. DO NOT EXCEED A TOTAL OF 3 DOSES IN 15 MINUTES.    pregabalin (LYRICA) 150 MG capsule Take 1 capsule (150 mg total) by mouth 2 (two) times daily.    senna-docusate 8.6-50 mg (PERICOLACE) 8.6-50 mg per tablet Take 1 tablet by mouth 2 (two) times daily as needed for Constipation.    sertraline (ZOLOFT) 100 MG tablet TAKE ONE TABLET BY MOUTH EVERY DAY    torsemide (DEMADEX) 20 MG Tab Take 1 tablet (20 mg total) by mouth once daily.    [DISCONTINUED] blood-glucose transmitter (DEXCOM G6 TRANSMITTER) Nicole 1 each by  Misc.(Non-Drug; Combo Route) route continuous prn.     Family History       Problem Relation (Age of Onset)    COPD Mother    Colon cancer Maternal Grandmother    Coronary artery disease Father    Diabetes Father    Hernia Mother    Lupus Mother    Ovarian cancer Mother    Uterine cancer Mother          Tobacco Use    Smoking status: Former     Current packs/day: 0.00     Types: Cigarettes     Quit date: 2020     Years since quittin.4     Passive exposure: Past    Smokeless tobacco: Never   Substance and Sexual Activity    Alcohol use: Not Currently     Comment: occasionally    Drug use: Yes     Types: Hydrocodone     Comment: three times a day    Sexual activity: Not Currently     Review of Systems   Constitutional:  Positive for chills, fatigue and fever. Negative for activity change, appetite change and diaphoresis.   HENT:  Positive for congestion. Negative for sore throat and trouble swallowing.    Respiratory:  Positive for cough, chest tightness and shortness of breath. Negative for choking and wheezing.    Cardiovascular:  Positive for chest pain and palpitations. Negative for leg swelling.   Gastrointestinal:  Negative for abdominal pain, constipation, diarrhea, nausea and vomiting.   Genitourinary:  Positive for dysuria. Negative for difficulty urinating and hematuria.   Musculoskeletal:  Positive for gait problem (chronic) and myalgias.   Neurological:  Positive for weakness. Negative for dizziness, syncope, light-headedness and headaches.     Objective:     Vital Signs (Most Recent):  Temp: 97.7 °F (36.5 °C) (25)  Pulse: 87 (25)  Resp: 19 (25)  BP: 133/62 (25)  SpO2: 96 % (25) Vital Signs (24h Range):  Temp:  [97.7 °F (36.5 °C)-102.3 °F (39.1 °C)] 97.7 °F (36.5 °C)  Pulse:  [] 87  Resp:  [16-28] 19  SpO2:  [95 %-100 %] 96 %  BP: (133-160)/(62-71) 133/62     Weight: 98 kg (216 lb 0.8 oz)  Body mass index is 40.82 kg/m².     Physical  Exam  Vitals and nursing note reviewed.   Constitutional:       General: She is not in acute distress.     Appearance: Normal appearance. She is well-developed. She is obese. She is ill-appearing (chronically). She is not toxic-appearing.   HENT:      Head: Normocephalic and atraumatic.      Mouth/Throat:      Dentition: Normal dentition.   Eyes:      General: Lids are normal.      Extraocular Movements: Extraocular movements intact.      Conjunctiva/sclera: Conjunctivae normal.   Cardiovascular:      Rate and Rhythm: Regular rhythm. Tachycardia present.      Heart sounds: No murmur heard.  Pulmonary:      Effort: Pulmonary effort is normal.      Breath sounds: Wheezing and rhonchi present.      Comments: Resting on nasal cannula.  No increased work of breathing or conversational dyspnea.  Nonproductive cough during interview/exam.  Bilateral lower base rhonchi with right mid lung expiratory wheeze.  Abdominal:      General: Bowel sounds are normal. There is no distension.      Palpations: Abdomen is soft.      Tenderness: There is no abdominal tenderness.   Musculoskeletal:      Cervical back: Neck supple.      Right lower leg: Edema (1+ pitting mid shin to ankle) present.      Left lower leg: Edema (1+ pitting mid shin to ankle) present.   Skin:     General: Skin is warm and dry.      Findings: No erythema or rash.   Neurological:      Mental Status: She is alert and oriented to person, place, and time.             Significant Labs: All pertinent labs within the past 24 hours have been reviewed.  CBC:   Recent Labs   Lab 03/31/25  1156   WBC 32.25*   HGB 10.4*   HCT 32.7*        CMP:   Recent Labs   Lab 03/31/25  1307   *   K 4.4      CO2 21*   BUN 21   CREATININE 1.4   CALCIUM 9.1   ALBUMIN 3.3*   BILITOT 0.5   ALKPHOS 71   AST 19   ALT 15   ANIONGAP 12     Magnesium:   Recent Labs   Lab 03/31/25  1307   MG 1.7     Urine Studies:   Recent Labs   Lab 03/31/25  1307   COLORU Colorless*    APPEARANCEUA Clear   PHUR 6.0   SPECGRAV 1.010   PROTEINUA Negative   GLUCUA Trace*   BILIRUBINUA Negative   OCCULTUA Negative   NITRITE Negative   UROBILINOGEN Negative   LEUKOCYTESUR Negative     Significant Imaging: I have reviewed all pertinent imaging results/findings within the past 24 hours.  Assessment/Plan:     Assessment & Plan  CAP (community acquired pneumonia); multilobular  Acute on chronic respiratory failure with hypoxia  On home oxygen therapy  COPD (chronic obstructive pulmonary disease); SUSPECTED  CAFL (chronic airflow limitation)  Chronic bronchitis  Former smoker  -admitted due to community-acquired pneumonia, multilobular, in setting of known COPD, chronic airflow limitation, chronic respiratory failure on home oxygen, in former smoker  -at admission hypertensive otherwise hemoglobin stable and saturation stable on nasal cannula supplementation; T-max 102.3°   -ED workup detailed above however notable for WBC 32 and procalcitonin 1.34  -initiated on cefepime vancomycin in ED >>> continue   -tailor/de-escalate as appropriate   -hold IV fluids at current D/T normal lactic and CHF history   -monitor for steroid need however wheezing minimal   -encourage PO intake as tolerated  -follow blood cultures   -sputum culture pending collection   -Mando PRN   -incentive spirometer and flutter valve for pulmonary toileting   -PRN supportive care as indicated   -continue oxygen supplementation  -strict I&Os  -trend labs, address/replete electrolytes as indicated    Chronic combined systolic and diastolic congestive heart failure  Essential hypertension  -chronic, BP elevated at admission, some LE edema on physical exam  -most recent TTE 06/2024 with EF 40-50% mild pulmonary HTN with PASP 45  -holding IVF resuscitation as detailed above  -continue home losartan, metoprolol, and nifedipine with hold parameters   -holding home torsemide for now >>> resume when clinically appropriate  -dose/medication  adjustment as appropriate   -continue tele monitoring  -strict I&Os   -trend labs, address/replete electrolytes as indicated    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 pectoris  Athscl heart disease of native coronary artery w/o ang pctrs  -history of   -[facility] 05/2022 detailed in HPI  -continue home ASA  -hold home fenofibrate and repatha for now >> resume at DC  -continue tele monitoring  -EKG PRN concerns     History of CVA (cerebrovascular accident)  Hemiplegia and hemiparesis following cerebral infarction affecting left non-dominant side  -history of   -continue home ASA  -hold home fenofibrate and repatha for now >> resume at DC  -fall precautions     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 disease  -chronic, not well controlled   -hemoglobin A1c 7.9/180 3/06/2025   -hold home Lantus 26 units BID and lispro 15 units with breakfast, 15 units with lunch, N 11 units with dinner plus low-dose SSI   -begin dose reduced basal and prandial insulin plus low-dose SSI  -dose/medication adjustment as appropriate   -monitor accuchecks AC/HS and PRN hypoglycemic protocol     Gastroesophageal reflux disease without esophagitis  -chronic  -PRN supportive care as indicated    Stage 3 chronic kidney disease  -chronic  -baseline SCr 1.1-1.2  -at admission SCr 1.4  -avoid nephrotoxins and hypotension as able, renally dose medications  -trend labs, address/replete electrolytes as indicated    Depression, unspecified  Generalized anxiety disorder  Recurrent major depressive disorder, in remission  -chronic  -continue home sertraline  -additional PRN supportive care as indicated     Chronic pain syndrome  Fibromyalgia  -chronic  -PRN supportive care for pain  -fall precautions  -consider PT/OT consult in AM     VTE Risk Mitigation (From admission, onward)           Ordered     enoxaparin injection 40 mg  Daily         03/31/25 1639     IP VTE HIGH RISK PATIENT  Once         03/31/25  1639     Place sequential compression device  Until discontinued         03/31/25 1639                       Rina Blanc DNP, AG-ACNP, BC  Department of Hospital Medicine  [facility]

## 2025-04-01 NOTE — ASSESSMENT & PLAN NOTE
-chronic, not well controlled   -hemoglobin A1c 7.9/180 3/06/2025   -hold home Lantus 26 units BID and lispro 15 units with breakfast, 15 units with lunch, N 11 units with dinner plus low-dose SSI   -begin dose reduced basal and prandial insulin plus low-dose SSI  -dose/medication adjustment as appropriate   -monitor accuchecks AC/HS and PRN hypoglycemic protocol   -4/1 - change to home doses of basal insulin, increase prandial.  Persistently hyperglycemic, per daughter this is typical when she gets an infection.

## 2025-04-01 NOTE — ASSESSMENT & PLAN NOTE
-admitted due to community-acquired pneumonia, multilobular, in setting of known COPD, chronic airflow limitation, chronic respiratory failure on home oxygen, in former smoker  -at admission hypertensive otherwise hemoglobin stable and saturation stable on nasal cannula supplementation; T-max 102.3°   -ED workup detailed above however notable for WBC 32 and procalcitonin 1.34  -initiated on cefepime vancomycin in ED >>> good improvement overnight, change to ceftriaxone/azithromycin   -hold IV fluids at current D/T normal lactic and CHF history   -monitor for steroid need however wheezing minimal   -encourage PO intake as tolerated  -follow blood cultures   -sputum culture pending collection   -Mando PRN   -incentive spirometer and flutter valve for pulmonary toileting   -PRN supportive care as indicated   -continue oxygen supplementation  -strict I&Os  -trend labs, address/replete electrolytes as indicated

## 2025-04-01 NOTE — PLAN OF CARE
Case Management Assessment     PCP: Chun Zapata MD  Pharmacy: [facility]    Patient Arrived From: home  Existing Help at Home: Rey    Barriers to Discharge: none    Discharge Plan:    A. Home with family   B. Home with fmily      Assessment completed with patient and daughterEsha, at bedside---patient alert and oriented. Patient lives with daughter, utilizes RW, cane, home O2, bath bench, nebulizer, BSC, and WC for DME. Patient daughter to help transport patient home.        [facility] ([address])  Initial Discharge Assessment       Primary Care Provider: Chun Zapata MD    Admission Diagnosis: Community acquired pneumonia [J18.9]  Chronic combined systolic and diastolic CHF (congestive heart failure) [I50.42]    Admission Date: 3/31/2025  Expected Discharge Date:     Transition of Care Barriers: None    Payor: MEDICARE / Plan: MEDICARE PART A & B / Product Type: Government /     Extended Emergency Contact Information  Primary Emergency Contact: Esha Severino  Address: [address] [place] [address]  Home Phone: [phone]  Relation: Daughter    Discharge Plan A: Home with family  Discharge Plan B: Home with family      [id number] - [place], [place] - [address] AT [facility] OF [address] [place] [address]  Phone: [phone] Fax: [phone]    [facility] [address] [place] [address] - [facility] [address]  Phone: [phone] Fax: [phone]    [facility]  [address] [place] [address] [address]  [facility] [address]  Phone: [phone] Fax: [phone]    [facility]  [address] [place] [address] A  [facility] [address]  Phone: [phone] Fax: [phone]      Initial Assessment (most recent)       Adult Discharge Assessment - 04/01/25 1004          Discharge Assessment    Assessment Type Discharge  Planning Assessment     Confirmed/corrected address, phone number and insurance Yes     Confirmed Demographics Correct on Facesheet     Source of Information patient;family     Communicated KYLE with patient/caregiver Date not available/Unable to determine     People in Home child(georges), adult     Do you expect to return to your current living situation? Yes     Do you have help at home or someone to help you manage your care at home? Yes     Who are your caregiver(s) and their phone number(s)? Esha Severino (Daughter)  [phone]     Prior to hospitilization cognitive status: Alert/Oriented     Current cognitive status: Alert/Oriented     Walking or Climbing Stairs Difficulty yes     Walking or Climbing Stairs ambulation difficulty, requires equipment;stair climbing difficulty, requires equipment;transferring difficulty, requires equipment     Mobility Management RW, cane     Dressing/Bathing Difficulty yes     Dressing/Bathing bathing difficulty, assistance 1 person;dressing difficulty, assistance 1 person     Dressing/Bathing Management daughter assists     Equipment Currently Used at Home walker, rolling;cane, straight;oxygen;bath bench;bedside commode;wheelchair;nebulizer     Readmission within 30 days? No     Patient currently being followed by outpatient case management? No     Do you currently have service(s) that help you manage your care at home? No     Do you take prescription medications? Yes     Do you have prescription coverage? Yes     Coverage Medicare A&B     Do you have any problems affording any of your prescribed medications? No     Is the patient taking medications as prescribed? yes     Who is going to help you get home at discharge? Esha Severino (Daughter)  [phone]     How do you get to doctors appointments? other (see comments)   Uber/lyft    Are you on dialysis? No     Do you take coumadin? No     Discharge Plan A Home with family     Discharge Plan B Home with family     DME  Needed Upon Discharge  none     Discharge Plan discussed with: Patient;Adult children     Transition of Care Barriers None        Physical Activity    On average, how many days per week do you engage in moderate to strenuous exercise (like a brisk walk)? 0 days     On average, how many minutes do you engage in exercise at this level? 0 min        Financial Resource Strain    How hard is it for you to pay for the very basics like food, housing, medical care, and heating? Not hard at all        Housing Stability    In the last 12 months, was there a time when you were not able to pay the mortgage or rent on time? No     In the past 12 months, how many times have you moved where you were living? 0     At any time in the past 12 months, were you homeless or living in a shelter (including now)? No        Transportation Needs    In the past 12 months, has lack of transportation kept you from medical appointments or from getting medications? No     In the past 12 months, has lack of transportation kept you from meetings, work, or from getting things needed for daily living? No        Food Insecurity    Within the past 12 months, you worried that your food would run out before you got the money to buy more. Never true     Within the past 12 months, the food you bought just didn't last and you didn't have money to get more. Never true        Social Isolation    How often do you feel lonely or isolated from those around you?  Never        Alcohol Use    Q1: How often do you have a drink containing alcohol? Never     Q2: How many drinks containing alcohol do you have on a typical day when you are drinking? Patient does not drink     Q3: How often do you have six or more drinks on one occasion? Never        Utilities    In the past 12 months has the electric, gas, oil, or water company threatened to shut off services in your home? No        Health Literacy    How often do you need to have someone help you when you read  instructions, pamphlets, or other written material from your doctor or pharmacy? Sometimes        OTHER    Name(s) of People in Home Esha Severino (Daughter)  [phone]

## 2025-04-02 LAB
ABSOLUTE EOSINOPHIL (OHS): 0.27 K/UL
ABSOLUTE MONOCYTE (OHS): 1.07 K/UL (ref 0.3–1)
ABSOLUTE NEUTROPHIL COUNT (OHS): 16.93 K/UL (ref 1.8–7.7)
ALBUMIN SERPL BCP-MCNC: 3 G/DL (ref 3.5–5.2)
ALP SERPL-CCNC: 93 UNIT/L (ref 40–150)
ALT SERPL W/O P-5'-P-CCNC: 14 UNIT/L (ref 10–44)
ANION GAP (OHS): 12 MMOL/L (ref 8–16)
AST SERPL-CCNC: 15 UNIT/L (ref 11–45)
BASOPHILS # BLD AUTO: 0.06 K/UL
BASOPHILS NFR BLD AUTO: 0.3 %
BILIRUB SERPL-MCNC: 0.3 MG/DL (ref 0.1–1)
BUN SERPL-MCNC: 27 MG/DL (ref 8–23)
CALCIUM SERPL-MCNC: 9.8 MG/DL (ref 8.7–10.5)
CHLORIDE SERPL-SCNC: 103 MMOL/L (ref 95–110)
CO2 SERPL-SCNC: 23 MMOL/L (ref 23–29)
CREAT SERPL-MCNC: 1.3 MG/DL (ref 0.5–1.4)
ERYTHROCYTE [DISTWIDTH] IN BLOOD BY AUTOMATED COUNT: 16.1 % (ref 11.5–14.5)
GFR SERPLBLD CREATININE-BSD FMLA CKD-EPI: 42 ML/MIN/1.73/M2
GLUCOSE SERPL-MCNC: 292 MG/DL (ref 70–110)
HCT VFR BLD AUTO: 28.6 % (ref 37–48.5)
HGB BLD-MCNC: 9 GM/DL (ref 12–16)
IMM GRANULOCYTES # BLD AUTO: 0.15 K/UL (ref 0–0.04)
IMM GRANULOCYTES NFR BLD AUTO: 0.7 % (ref 0–0.5)
LYMPHOCYTES # BLD AUTO: 1.58 K/UL (ref 1–4.8)
MCH RBC QN AUTO: 27.4 PG (ref 27–31)
MCHC RBC AUTO-ENTMCNC: 31.5 G/DL (ref 32–36)
MCV RBC AUTO: 87 FL (ref 82–98)
NUCLEATED RBC (/100WBC) (OHS): 0 /100 WBC
PLATELET # BLD AUTO: 354 K/UL (ref 150–450)
PMV BLD AUTO: 11.7 FL (ref 9.2–12.9)
POCT GLUCOSE: 186 MG/DL (ref 70–110)
POCT GLUCOSE: 195 MG/DL (ref 70–110)
POCT GLUCOSE: 313 MG/DL (ref 70–110)
POCT GLUCOSE: 347 MG/DL (ref 70–110)
POTASSIUM SERPL-SCNC: 4.4 MMOL/L (ref 3.5–5.1)
PROT SERPL-MCNC: 7.2 GM/DL (ref 6–8.4)
RBC # BLD AUTO: 3.28 M/UL (ref 4–5.4)
RELATIVE EOSINOPHIL (OHS): 1.3 %
RELATIVE LYMPHOCYTE (OHS): 7.9 % (ref 18–48)
RELATIVE MONOCYTE (OHS): 5.3 % (ref 4–15)
RELATIVE NEUTROPHIL (OHS): 84.5 % (ref 38–73)
SODIUM SERPL-SCNC: 138 MMOL/L (ref 136–145)
WBC # BLD AUTO: 20.06 K/UL (ref 3.9–12.7)

## 2025-04-02 PROCEDURE — 94640 AIRWAY INHALATION TREATMENT: CPT

## 2025-04-02 PROCEDURE — 80053 COMPREHEN METABOLIC PANEL: CPT | Performed by: HOSPITALIST

## 2025-04-02 PROCEDURE — 94799 UNLISTED PULMONARY SVC/PX: CPT

## 2025-04-02 PROCEDURE — 94664 DEMO&/EVAL PT USE INHALER: CPT

## 2025-04-02 PROCEDURE — 87205 SMEAR GRAM STAIN: CPT | Performed by: NURSE PRACTITIONER

## 2025-04-02 PROCEDURE — 94761 N-INVAS EAR/PLS OXIMETRY MLT: CPT

## 2025-04-02 PROCEDURE — 85025 COMPLETE CBC W/AUTO DIFF WBC: CPT | Performed by: HOSPITALIST

## 2025-04-02 PROCEDURE — 99900035 HC TECH TIME PER 15 MIN (STAT)

## 2025-04-02 PROCEDURE — 27000646 HC AEROBIKA DEVICE

## 2025-04-02 PROCEDURE — 25000003 PHARM REV CODE 250: Performed by: NURSE PRACTITIONER

## 2025-04-02 PROCEDURE — 36415 COLL VENOUS BLD VENIPUNCTURE: CPT | Performed by: HOSPITALIST

## 2025-04-02 PROCEDURE — 25000242 PHARM REV CODE 250 ALT 637 W/ HCPCS: Performed by: HOSPITALIST

## 2025-04-02 PROCEDURE — 25000003 PHARM REV CODE 250: Performed by: HOSPITALIST

## 2025-04-02 PROCEDURE — 63600175 PHARM REV CODE 636 W HCPCS: Performed by: HOSPITALIST

## 2025-04-02 PROCEDURE — 63600175 PHARM REV CODE 636 W HCPCS: Performed by: NURSE PRACTITIONER

## 2025-04-02 PROCEDURE — 27000221 HC OXYGEN, UP TO 24 HOURS

## 2025-04-02 PROCEDURE — 11000001 HC ACUTE MED/SURG PRIVATE ROOM

## 2025-04-02 PROCEDURE — 21400001 HC TELEMETRY ROOM

## 2025-04-02 RX ORDER — TALC
6 POWDER (GRAM) TOPICAL NIGHTLY
Status: DISCONTINUED | OUTPATIENT
Start: 2025-04-02 | End: 2025-04-09 | Stop reason: HOSPADM

## 2025-04-02 RX ORDER — PROMETHAZINE HYDROCHLORIDE AND CODEINE PHOSPHATE 6.25; 1 MG/5ML; MG/5ML
10 SOLUTION ORAL NIGHTLY
Status: DISCONTINUED | OUTPATIENT
Start: 2025-04-02 | End: 2025-04-09 | Stop reason: HOSPADM

## 2025-04-02 RX ORDER — DICLOFENAC SODIUM 10 MG/G
4 GEL TOPICAL DAILY
Status: DISCONTINUED | OUTPATIENT
Start: 2025-04-02 | End: 2025-04-09 | Stop reason: HOSPADM

## 2025-04-02 RX ORDER — GUAIFENESIN 600 MG/1
600 TABLET, EXTENDED RELEASE ORAL 2 TIMES DAILY
Status: DISCONTINUED | OUTPATIENT
Start: 2025-04-02 | End: 2025-04-09 | Stop reason: HOSPADM

## 2025-04-02 RX ADMIN — ENOXAPARIN SODIUM 40 MG: 40 INJECTION SUBCUTANEOUS at 05:04

## 2025-04-02 RX ADMIN — PREGABALIN 150 MG: 75 CAPSULE ORAL at 08:04

## 2025-04-02 RX ADMIN — HYDROCODONE BITARTRATE AND ACETAMINOPHEN 1 TABLET: 5; 325 TABLET ORAL at 03:04

## 2025-04-02 RX ADMIN — ALLOPURINOL 300 MG: 300 TABLET ORAL at 08:04

## 2025-04-02 RX ADMIN — SENNOSIDES AND DOCUSATE SODIUM 1 TABLET: 50; 8.6 TABLET ORAL at 08:04

## 2025-04-02 RX ADMIN — HYDROCODONE BITARTRATE AND ACETAMINOPHEN 1 TABLET: 5; 325 TABLET ORAL at 07:04

## 2025-04-02 RX ADMIN — NIFEDIPINE 30 MG: 30 TABLET, FILM COATED, EXTENDED RELEASE ORAL at 08:04

## 2025-04-02 RX ADMIN — INSULIN GLARGINE 26 UNITS: 100 INJECTION, SOLUTION SUBCUTANEOUS at 08:04

## 2025-04-02 RX ADMIN — CEFTRIAXONE 1 G: 1 INJECTION, POWDER, FOR SOLUTION INTRAMUSCULAR; INTRAVENOUS at 08:04

## 2025-04-02 RX ADMIN — METHOCARBAMOL 500 MG: 500 TABLET ORAL at 03:04

## 2025-04-02 RX ADMIN — IPRATROPIUM BROMIDE AND ALBUTEROL SULFATE 3 ML: 2.5; .5 SOLUTION RESPIRATORY (INHALATION) at 04:04

## 2025-04-02 RX ADMIN — GUAIFENESIN 600 MG: 600 TABLET, EXTENDED RELEASE ORAL at 11:04

## 2025-04-02 RX ADMIN — METOPROLOL SUCCINATE ER TABLETS 100 MG: 50 TABLET, FILM COATED, EXTENDED RELEASE ORAL at 08:04

## 2025-04-02 RX ADMIN — AZITHROMYCIN MONOHYDRATE 500 MG: 500 INJECTION, POWDER, LYOPHILIZED, FOR SOLUTION INTRAVENOUS at 11:04

## 2025-04-02 RX ADMIN — SERTRALINE HYDROCHLORIDE 100 MG: 100 TABLET ORAL at 08:04

## 2025-04-02 RX ADMIN — INSULIN ASPART 10 UNITS: 100 INJECTION, SOLUTION INTRAVENOUS; SUBCUTANEOUS at 05:04

## 2025-04-02 RX ADMIN — METHOCARBAMOL 500 MG: 500 TABLET ORAL at 07:04

## 2025-04-02 RX ADMIN — IPRATROPIUM BROMIDE AND ALBUTEROL SULFATE 3 ML: 2.5; .5 SOLUTION RESPIRATORY (INHALATION) at 12:04

## 2025-04-02 RX ADMIN — PROMETHAZINE HYDROCHLORIDE AND CODEINE PHOSPHATE 10 ML: 6.25; 1 SOLUTION ORAL at 08:04

## 2025-04-02 RX ADMIN — ASPIRIN 81 MG: 81 TABLET, COATED ORAL at 08:04

## 2025-04-02 RX ADMIN — HYDROCODONE BITARTRATE AND ACETAMINOPHEN 1 TABLET: 5; 325 TABLET ORAL at 08:04

## 2025-04-02 RX ADMIN — IPRATROPIUM BROMIDE AND ALBUTEROL SULFATE 3 ML: 2.5; .5 SOLUTION RESPIRATORY (INHALATION) at 07:04

## 2025-04-02 RX ADMIN — INSULIN ASPART 10 UNITS: 100 INJECTION, SOLUTION INTRAVENOUS; SUBCUTANEOUS at 01:04

## 2025-04-02 RX ADMIN — MELATONIN TAB 3 MG 6 MG: 3 TAB at 08:04

## 2025-04-02 RX ADMIN — INSULIN ASPART 10 UNITS: 100 INJECTION, SOLUTION INTRAVENOUS; SUBCUTANEOUS at 08:04

## 2025-04-02 RX ADMIN — GUAIFENESIN 600 MG: 600 TABLET, EXTENDED RELEASE ORAL at 08:04

## 2025-04-02 RX ADMIN — DICLOFENAC SODIUM 4 G: 10 GEL TOPICAL at 11:04

## 2025-04-02 RX ADMIN — LOSARTAN POTASSIUM 50 MG: 50 TABLET, FILM COATED ORAL at 08:04

## 2025-04-02 NOTE — ASSESSMENT & PLAN NOTE
-admitted due to community-acquired pneumonia, multilobular, in setting of known COPD, chronic airflow limitation, chronic respiratory failure on home oxygen, in former smoker  -at admission hypertensive otherwise hemoglobin stable and saturation stable on nasal cannula supplementation; T-max 102.3°   -ED workup detailed above however notable for WBC 32 and procalcitonin 1.34  -initiated on cefepime vancomycin in ED >>> good improvement overnight, change to ceftriaxone/azithromycin   -hold IV fluids at current D/T normal lactic and CHF history   -monitor for steroid need however wheezing minimal   -encourage PO intake as tolerated  -follow blood cultures   -sputum culture pending collection   -DuoNebs every 4 hours while awake   -incentive spirometer and flutter valve for pulmonary toileting   -PRN supportive care as indicated   -continue oxygen supplementation  -strict I&Os  -trend labs, address/replete electrolytes as indicated  -Add guaifenesin during the day, codeine at night.  Requests melatonin.

## 2025-04-02 NOTE — PLAN OF CARE
Problem: Adult Inpatient Plan of Care  Goal: Plan of Care Review  Outcome: Progressing  Goal: Patient-Specific Goal (Individualized)  Outcome: Progressing  Goal: Absence of Hospital-Acquired Illness or Injury  Outcome: Progressing  Goal: Optimal Comfort and Wellbeing  Outcome: Progressing  Goal: Readiness for Transition of Care  Outcome: Progressing     Problem: Bariatric Environmental Safety  Goal: Safety Maintained with Care  Outcome: Progressing     Problem: Infection  Goal: Absence of Infection Signs and Symptoms  Outcome: Progressing     Problem: Diabetes Comorbidity  Goal: Blood Glucose Level Within Targeted Range  Outcome: Progressing     Problem: Pneumonia  Goal: Fluid Balance  Outcome: Progressing  Goal: Resolution of Infection Signs and Symptoms  Outcome: Progressing  Goal: Effective Oxygenation and Ventilation  Outcome: Progressing

## 2025-04-02 NOTE — SUBJECTIVE & OBJECTIVE
"Interval History: Unable to sleep last night due to cough.  Cough is now productive and has a "color."  Sample sent to lab last night.  She has increasing pain in her right shoulder, tender to palpation but able to raise it above a 90 degree angle.    Review of Systems   Constitutional:  Negative for chills and fever.   Respiratory:  Positive for cough and shortness of breath.    Cardiovascular:  Negative for chest pain and palpitations.   Musculoskeletal:  Positive for arthralgias and back pain.   Psychiatric/Behavioral:  Positive for sleep disturbance.      Objective:     Vital Signs (Most Recent):  Temp: 98.6 °F (37 °C) (04/02/25 0753)  Pulse: 96 (04/02/25 0753)  Resp: 18 (04/02/25 0753)  BP: (!) 149/65 (04/02/25 0753)  SpO2: 99 % (04/02/25 0753) Vital Signs (24h Range):  Temp:  [97.5 °F (36.4 °C)-98.7 °F (37.1 °C)] 98.6 °F (37 °C)  Pulse:  [72-96] 96  Resp:  [14-20] 18  SpO2:  [94 %-99 %] 99 %  BP: (125-149)/(61-65) 149/65     Weight: 98 kg (216 lb 0.8 oz)  Body mass index is 40.82 kg/m².    Intake/Output Summary (Last 24 hours) at 4/2/2025 0934  Last data filed at 4/2/2025 0800  Gross per 24 hour   Intake 120 ml   Output 2100 ml   Net -1980 ml         Physical Exam  Constitutional:       Appearance: She is ill-appearing.   Cardiovascular:      Rate and Rhythm: Normal rate and regular rhythm.      Heart sounds: Normal heart sounds. No murmur heard.     No gallop.   Pulmonary:      Effort: Pulmonary effort is normal.      Breath sounds: Rhonchi and rales present. No wheezing.   Abdominal:      General: Bowel sounds are normal.      Palpations: Abdomen is soft.   Skin:     General: Skin is warm and dry.   Neurological:      General: No focal deficit present.      Mental Status: She is alert.   Psychiatric:         Mood and Affect: Mood normal.         Behavior: Behavior normal.               Significant Labs: All pertinent labs within the past 24 hours have been reviewed.    Significant Imaging: I have reviewed " all pertinent imaging results/findings within the past 24 hours.

## 2025-04-02 NOTE — ASSESSMENT & PLAN NOTE
-history of   -[facility] 05/2022 detailed in HPI  -continue home ASA  -hold home fenofibrate and repatha for now >> resume at DC  -continue tele monitoring  -EKG PRN concerns

## 2025-04-02 NOTE — PROGRESS NOTES
[facility] [address] Medicine  Progress Note    Patient Name: Indira Waters  MRN: [id number]  Patient Class: IP- Inpatient   Admission Date: 3/31/2025  Length of Stay: 2 days  Attending Physician: Annamarie Scott MD  Primary Care Provider: Chun Zapata MD        Subjective     Principal Problem:CAP (community acquired pneumonia)        HPI:  HPi by Rina Blanc NP:  Ms. Waters is a 80 YOF with PMHx of HFrEF (EF 40-50% 06/2024), CAD ([facility] 05/2022 with severe 2 vessel, predominantly distal, disease), aortic atherosclerosis with tortuous aorta, HTN, HLD, DM type II, diabetic polyneuropathy,COPD, chronic respiratory insuffiencey/chronic airflow limitation on home oxygen qHS , former smoker, CKD III (baseline SCr 1.1-1.2), chronic anemia, history of CVA with residual left-sided deficits, GERD, fibromyalgia, chronic pain, osteoarthritis, anxiety/depression, and obesity.     She presents to ED via EMS with her daughter due to generally feeling unwell the last couple of days with fatigue, malaise, subjective fevers, chills, shortness of breath, productive cough, upper respiratory congestion, chest pain, tachycardia, and decreased oxygen saturation from baseline requiring increased nasal cannula supplementation. Ms. Waters also has complaints of dysuria. Daughter states that prior to ED arrival she had a hallucination of someone sitting on her bed that was not there, prompting her to activate EMS. Ms. Waters denies abdominal pain, N/V/D, constipation, hematuria, decreased UOP, changes in bowel habits or blood in stool, decreased appetite, changes in PO intake, light headiness, dizziness, or headaches.  She lives with her daughter, uses ambualtory aides as needed, and requires some assistance with ADLs.     In the ED she is hypertensive, heart rate 80-100s, saturation stable on 2-3 L nasal cannula, and T-max 102.3°.  CBC with WBC 32, H and H 10.4/33, platelets 376.  Chemistry NA 34, K  "4.4, chloride 101, CO2 21, BUN 21, SCr 1.4, glucose 328.  Phosphorus 4.2, magnesium 1.7.  LFTs unremarkable.  POCT lactic 1.70.  Procalcitonin 1.34.  UA noninfectious.  Blood cultures in process.  EKG with sinus tach and inferolateral ST and T-wave abnormality similar to priors.  CXR with  abnormal opacities left perihilar and right upper lobe opacities which can be seen with multilobar infection in this patient with provided history of sepsis.     The patient was admitted to the Hospital Medicine Service for further evaluation and management.     Overview/Hospital Course:  Patient admitted for treatment of sepsis due to multifocal pneumonia and associated severe hyperglycemia.  She had good improvement in her mental status, fever and shortness of breath and antibiotics were changed to ceftriaxone/azithromycin the following day.    Interval History: Unable to sleep last night due to cough.  Cough is now productive and has a "color."  Sample sent to lab last night.  She has increasing pain in her right shoulder, tender to palpation but able to raise it above a 90 degree angle.    Review of Systems   Constitutional:  Negative for chills and fever.   Respiratory:  Positive for cough and shortness of breath.    Cardiovascular:  Negative for chest pain and palpitations.   Musculoskeletal:  Positive for arthralgias and back pain.   Psychiatric/Behavioral:  Positive for sleep disturbance.      Objective:     Vital Signs (Most Recent):  Temp: 98.6 °F (37 °C) (04/02/25 0753)  Pulse: 96 (04/02/25 0753)  Resp: 18 (04/02/25 0753)  BP: (!) 149/65 (04/02/25 0753)  SpO2: 99 % (04/02/25 0753) Vital Signs (24h Range):  Temp:  [97.5 °F (36.4 °C)-98.7 °F (37.1 °C)] 98.6 °F (37 °C)  Pulse:  [72-96] 96  Resp:  [14-20] 18  SpO2:  [94 %-99 %] 99 %  BP: (125-149)/(61-65) 149/65     Weight: 98 kg (216 lb 0.8 oz)  Body mass index is 40.82 kg/m².    Intake/Output Summary (Last 24 hours) at 4/2/2025 0934  Last data filed at 4/2/2025 " 0800  Gross per 24 hour   Intake 120 ml   Output 2100 ml   Net -1980 ml         Physical Exam  Constitutional:       Appearance: She is ill-appearing.   Cardiovascular:      Rate and Rhythm: Normal rate and regular rhythm.      Heart sounds: Normal heart sounds. No murmur heard.     No gallop.   Pulmonary:      Effort: Pulmonary effort is normal.      Breath sounds: Rhonchi and rales present. No wheezing.   Abdominal:      General: Bowel sounds are normal.      Palpations: Abdomen is soft.   Skin:     General: Skin is warm and dry.   Neurological:      General: No focal deficit present.      Mental Status: She is alert.   Psychiatric:         Mood and Affect: Mood normal.         Behavior: Behavior normal.               Significant Labs: All pertinent labs within the past 24 hours have been reviewed.    Significant Imaging: I have reviewed all pertinent imaging results/findings within the past 24 hours.      Assessment & Plan  CAP (community acquired pneumonia); multilobular  Acute on chronic respiratory failure with hypoxia  On home oxygen therapy  COPD (chronic obstructive pulmonary disease)  CAFL (chronic airflow limitation)  Chronic bronchitis  Former smoker  -admitted due to community-acquired pneumonia, multilobular, in setting of known COPD, chronic airflow limitation, chronic respiratory failure on home oxygen, in former smoker  -at admission hypertensive otherwise hemoglobin stable and saturation stable on nasal cannula supplementation; T-max 102.3°   -ED workup detailed above however notable for WBC 32 and procalcitonin 1.34  -initiated on cefepime vancomycin in ED >>> good improvement overnight, change to ceftriaxone/azithromycin   -hold IV fluids at current D/T normal lactic and CHF history   -monitor for steroid need however wheezing minimal   -encourage PO intake as tolerated  -follow blood cultures   -sputum culture pending collection   -DuoNebs every 4 hours while awake   -incentive spirometer and  flutter valve for pulmonary toileting   -PRN supportive care as indicated   -continue oxygen supplementation  -strict I&Os  -trend labs, address/replete electrolytes as indicated  -Add guaifenesin during the day, codeine at night.  Requests melatonin.    Chronic combined systolic and diastolic congestive heart failure  Essential hypertension  -chronic, BP elevated at admission, some LE edema on physical exam  -most recent TTE 06/2024 with EF 40-50% mild pulmonary HTN with PASP 45  -holding IVF resuscitation as detailed above  -continue home losartan, metoprolol, and nifedipine with hold parameters   -holding home torsemide for now >>> resume when clinically appropriate  -dose/medication adjustment as appropriate   -continue tele monitoring  -strict I&Os   -trend labs, address/replete electrolytes as indicated    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 pectoris  Athscl heart disease of native coronary artery w/o ang pctrs  -history of   -[facility] 05/2022 detailed in HPI  -continue home ASA  -hold home fenofibrate and repatha for now >> resume at DC  -continue tele monitoring  -EKG PRN concerns     History of CVA (cerebrovascular accident)  Hemiplegia and hemiparesis following cerebral infarction affecting left non-dominant side  -history of   -continue home ASA  -hold home fenofibrate and repatha for now >> resume at DC  -fall precautions     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 disease  -chronic, not well controlled   -hemoglobin A1c 7.9/180 3/06/2025   -hold home Lantus 26 units BID and lispro 15 units with breakfast, 15 units with lunch, N 11 units with dinner plus low-dose SSI   -begin dose reduced basal and prandial insulin plus low-dose SSI  -dose/medication adjustment as appropriate   -monitor accuchecks AC/HS and PRN hypoglycemic protocol   -4/1 - change to home doses of basal insulin, increase prandial.  Persistently hyperglycemic, per daughter this is typical  when she gets an infection.    Gastroesophageal reflux disease without esophagitis  -chronic  -PRN supportive care as indicated    Stage 3 chronic kidney disease  -chronic  -baseline SCr 1.1-1.2  -at admission SCr 1.4  -avoid nephrotoxins and hypotension as able, renally dose medications  -trend labs, address/replete electrolytes as indicated    Depression, unspecified  Generalized anxiety disorder  Recurrent major depressive disorder, in remission  -chronic  -continue home sertraline  -additional PRN supportive care as indicated     Chronic pain syndrome  Fibromyalgia  -chronic  -PRN supportive care for pain  -fall precautions      VTE Risk Mitigation (From admission, onward)           Ordered     enoxaparin injection 40 mg  Daily         03/31/25 1639     IP VTE HIGH RISK PATIENT  Once         03/31/25 1639     Place sequential compression device  Until discontinued         03/31/25 1639                    Discharge Planning   KYLE: 4/3/2025     Code Status: Full Code   Medical Readiness for Discharge Date:   Discharge Plan A: Home with family                        Annamarie Zuniga MD  Department of Hospital Medicine   [facility] ([address])

## 2025-04-02 NOTE — ASSESSMENT & PLAN NOTE
-history of   -[facility] 05/2022 detailed in HPI  -continue home ASA  -hold home fenofibrate and repatha for now >> resume at DC  -continue tele monitoring  -EKG PRN concerns

## 2025-04-02 NOTE — PLAN OF CARE
Problem: Adult Inpatient Plan of Care  Goal: Plan of Care Review  Outcome: Progressing  Goal: Patient-Specific Goal (Individualized)  Outcome: Progressing  Goal: Absence of Hospital-Acquired Illness or Injury  Outcome: Progressing  Goal: Optimal Comfort and Wellbeing  Outcome: Progressing  Goal: Readiness for Transition of Care  Outcome: Progressing     Problem: Infection  Goal: Absence of Infection Signs and Symptoms  Outcome: Progressing     Problem: Diabetes Comorbidity  Goal: Blood Glucose Level Within Targeted Range  Outcome: Progressing     Problem: Pneumonia  Goal: Fluid Balance  Outcome: Progressing  Goal: Resolution of Infection Signs and Symptoms  Outcome: Progressing  Goal: Effective Oxygenation and Ventilation  Outcome: Progressing

## 2025-04-03 LAB
ABSOLUTE EOSINOPHIL (OHS): 0.69 K/UL
ABSOLUTE MONOCYTE (OHS): 1.09 K/UL (ref 0.3–1)
ABSOLUTE NEUTROPHIL COUNT (OHS): 9.46 K/UL (ref 1.8–7.7)
ALBUMIN SERPL BCP-MCNC: 2.7 G/DL (ref 3.5–5.2)
ALP SERPL-CCNC: 72 UNIT/L (ref 40–150)
ALT SERPL W/O P-5'-P-CCNC: 10 UNIT/L (ref 10–44)
ANION GAP (OHS): 13 MMOL/L (ref 8–16)
AST SERPL-CCNC: 13 UNIT/L (ref 11–45)
BASOPHILS # BLD AUTO: 0.04 K/UL
BASOPHILS NFR BLD AUTO: 0.3 %
BILIRUB SERPL-MCNC: 0.2 MG/DL (ref 0.1–1)
BUN SERPL-MCNC: 25 MG/DL (ref 8–23)
CALCIUM SERPL-MCNC: 9.3 MG/DL (ref 8.7–10.5)
CHLORIDE SERPL-SCNC: 107 MMOL/L (ref 95–110)
CO2 SERPL-SCNC: 20 MMOL/L (ref 23–29)
CREAT SERPL-MCNC: 1 MG/DL (ref 0.5–1.4)
ERYTHROCYTE [DISTWIDTH] IN BLOOD BY AUTOMATED COUNT: 16.1 % (ref 11.5–14.5)
GFR SERPLBLD CREATININE-BSD FMLA CKD-EPI: 57 ML/MIN/1.73/M2
GLUCOSE SERPL-MCNC: 114 MG/DL (ref 70–110)
HCT VFR BLD AUTO: 28.7 % (ref 37–48.5)
HGB BLD-MCNC: 8.9 GM/DL (ref 12–16)
IMM GRANULOCYTES # BLD AUTO: 0.15 K/UL (ref 0–0.04)
IMM GRANULOCYTES NFR BLD AUTO: 1 % (ref 0–0.5)
LYMPHOCYTES # BLD AUTO: 2.94 K/UL (ref 1–4.8)
MCH RBC QN AUTO: 27.4 PG (ref 27–31)
MCHC RBC AUTO-ENTMCNC: 31 G/DL (ref 32–36)
MCV RBC AUTO: 88 FL (ref 82–98)
NUCLEATED RBC (/100WBC) (OHS): 0 /100 WBC
PLATELET # BLD AUTO: 272 K/UL (ref 150–450)
PMV BLD AUTO: 11.7 FL (ref 9.2–12.9)
POCT GLUCOSE: 136 MG/DL (ref 70–110)
POCT GLUCOSE: 170 MG/DL (ref 70–110)
POCT GLUCOSE: 174 MG/DL (ref 70–110)
POTASSIUM SERPL-SCNC: 4.3 MMOL/L (ref 3.5–5.1)
PROT SERPL-MCNC: 6.8 GM/DL (ref 6–8.4)
RBC # BLD AUTO: 3.25 M/UL (ref 4–5.4)
RELATIVE EOSINOPHIL (OHS): 4.8 %
RELATIVE LYMPHOCYTE (OHS): 20.5 % (ref 18–48)
RELATIVE MONOCYTE (OHS): 7.6 % (ref 4–15)
RELATIVE NEUTROPHIL (OHS): 65.8 % (ref 38–73)
SODIUM SERPL-SCNC: 140 MMOL/L (ref 136–145)
WBC # BLD AUTO: 14.37 K/UL (ref 3.9–12.7)

## 2025-04-03 PROCEDURE — 94761 N-INVAS EAR/PLS OXIMETRY MLT: CPT

## 2025-04-03 PROCEDURE — 27000221 HC OXYGEN, UP TO 24 HOURS

## 2025-04-03 PROCEDURE — 85025 COMPLETE CBC W/AUTO DIFF WBC: CPT | Performed by: HOSPITALIST

## 2025-04-03 PROCEDURE — 27000646 HC AEROBIKA DEVICE

## 2025-04-03 PROCEDURE — 25000003 PHARM REV CODE 250: Performed by: NURSE PRACTITIONER

## 2025-04-03 PROCEDURE — 63600175 PHARM REV CODE 636 W HCPCS: Performed by: HOSPITALIST

## 2025-04-03 PROCEDURE — 82040 ASSAY OF SERUM ALBUMIN: CPT | Performed by: HOSPITALIST

## 2025-04-03 PROCEDURE — 11000001 HC ACUTE MED/SURG PRIVATE ROOM

## 2025-04-03 PROCEDURE — 63600175 PHARM REV CODE 636 W HCPCS: Performed by: NURSE PRACTITIONER

## 2025-04-03 PROCEDURE — 94640 AIRWAY INHALATION TREATMENT: CPT

## 2025-04-03 PROCEDURE — 25000003 PHARM REV CODE 250: Performed by: HOSPITALIST

## 2025-04-03 PROCEDURE — 94664 DEMO&/EVAL PT USE INHALER: CPT

## 2025-04-03 PROCEDURE — 94799 UNLISTED PULMONARY SVC/PX: CPT

## 2025-04-03 PROCEDURE — 99900035 HC TECH TIME PER 15 MIN (STAT)

## 2025-04-03 PROCEDURE — 25000242 PHARM REV CODE 250 ALT 637 W/ HCPCS: Performed by: HOSPITALIST

## 2025-04-03 PROCEDURE — 21400001 HC TELEMETRY ROOM

## 2025-04-03 PROCEDURE — 36415 COLL VENOUS BLD VENIPUNCTURE: CPT | Performed by: HOSPITALIST

## 2025-04-03 RX ADMIN — INSULIN ASPART 10 UNITS: 100 INJECTION, SOLUTION INTRAVENOUS; SUBCUTANEOUS at 08:04

## 2025-04-03 RX ADMIN — IPRATROPIUM BROMIDE AND ALBUTEROL SULFATE 3 ML: 2.5; .5 SOLUTION RESPIRATORY (INHALATION) at 07:04

## 2025-04-03 RX ADMIN — DICLOFENAC SODIUM 4 G: 10 GEL TOPICAL at 08:04

## 2025-04-03 RX ADMIN — GUAIFENESIN 600 MG: 600 TABLET, EXTENDED RELEASE ORAL at 08:04

## 2025-04-03 RX ADMIN — NIFEDIPINE 30 MG: 30 TABLET, FILM COATED, EXTENDED RELEASE ORAL at 08:04

## 2025-04-03 RX ADMIN — SENNOSIDES AND DOCUSATE SODIUM 1 TABLET: 50; 8.6 TABLET ORAL at 08:04

## 2025-04-03 RX ADMIN — AZITHROMYCIN MONOHYDRATE 500 MG: 500 INJECTION, POWDER, LYOPHILIZED, FOR SOLUTION INTRAVENOUS at 10:04

## 2025-04-03 RX ADMIN — ALLOPURINOL 300 MG: 300 TABLET ORAL at 08:04

## 2025-04-03 RX ADMIN — HYDROCODONE BITARTRATE AND ACETAMINOPHEN 1 TABLET: 5; 325 TABLET ORAL at 08:04

## 2025-04-03 RX ADMIN — MELATONIN TAB 3 MG 6 MG: 3 TAB at 08:04

## 2025-04-03 RX ADMIN — INSULIN GLARGINE 26 UNITS: 100 INJECTION, SOLUTION SUBCUTANEOUS at 08:04

## 2025-04-03 RX ADMIN — INSULIN ASPART 10 UNITS: 100 INJECTION, SOLUTION INTRAVENOUS; SUBCUTANEOUS at 04:04

## 2025-04-03 RX ADMIN — METHOCARBAMOL 500 MG: 500 TABLET ORAL at 04:04

## 2025-04-03 RX ADMIN — INSULIN ASPART 10 UNITS: 100 INJECTION, SOLUTION INTRAVENOUS; SUBCUTANEOUS at 12:04

## 2025-04-03 RX ADMIN — IPRATROPIUM BROMIDE AND ALBUTEROL SULFATE 3 ML: 2.5; .5 SOLUTION RESPIRATORY (INHALATION) at 08:04

## 2025-04-03 RX ADMIN — SERTRALINE HYDROCHLORIDE 100 MG: 100 TABLET ORAL at 08:04

## 2025-04-03 RX ADMIN — ASPIRIN 81 MG: 81 TABLET, COATED ORAL at 09:04

## 2025-04-03 RX ADMIN — IPRATROPIUM BROMIDE AND ALBUTEROL SULFATE 3 ML: 2.5; .5 SOLUTION RESPIRATORY (INHALATION) at 12:04

## 2025-04-03 RX ADMIN — IPRATROPIUM BROMIDE AND ALBUTEROL SULFATE 3 ML: 2.5; .5 SOLUTION RESPIRATORY (INHALATION) at 04:04

## 2025-04-03 RX ADMIN — LOSARTAN POTASSIUM 50 MG: 50 TABLET, FILM COATED ORAL at 08:04

## 2025-04-03 RX ADMIN — METOPROLOL SUCCINATE ER TABLETS 100 MG: 50 TABLET, FILM COATED, EXTENDED RELEASE ORAL at 08:04

## 2025-04-03 RX ADMIN — HYDROCODONE BITARTRATE AND ACETAMINOPHEN 1 TABLET: 5; 325 TABLET ORAL at 01:04

## 2025-04-03 RX ADMIN — CEFTRIAXONE 1 G: 1 INJECTION, POWDER, FOR SOLUTION INTRAMUSCULAR; INTRAVENOUS at 08:04

## 2025-04-03 RX ADMIN — PROMETHAZINE HYDROCHLORIDE AND CODEINE PHOSPHATE 10 ML: 6.25; 1 SOLUTION ORAL at 08:04

## 2025-04-03 RX ADMIN — PREGABALIN 150 MG: 75 CAPSULE ORAL at 08:04

## 2025-04-03 RX ADMIN — ENOXAPARIN SODIUM 40 MG: 40 INJECTION SUBCUTANEOUS at 04:04

## 2025-04-03 NOTE — ASSESSMENT & PLAN NOTE
-history of   -[facility] 05/2022 detailed in HPI  -continue home ASA  -hold home fenofibrate and repatha for now >> resume at DC  -EKG PRN concerns      incomplete

## 2025-04-03 NOTE — PROGRESS NOTES
[facility] Surg [address] Medicine  Progress Note    Patient Name: Indira Waters  MRN: [id number]  Patient Class: IP- Inpatient   Admission Date: 3/31/2025  Length of Stay: 3 days  Attending Physician: Annamarie Scott MD  Primary Care Provider: Chun Zapata MD        Subjective     Principal Problem:CAP (community acquired pneumonia)        HPI:  HPi by Rina Blanc NP:  Ms. Waters is a 80 YOF with PMHx of HFrEF (EF 40-50% 06/2024), CAD ([facility] 05/2022 with severe 2 vessel, predominantly distal, disease), aortic atherosclerosis with tortuous aorta, HTN, HLD, DM type II, diabetic polyneuropathy,COPD, chronic respiratory insuffiencey/chronic airflow limitation on home oxygen qHS , former smoker, CKD III (baseline SCr 1.1-1.2), chronic anemia, history of CVA with residual left-sided deficits, GERD, fibromyalgia, chronic pain, osteoarthritis, anxiety/depression, and obesity.     She presents to ED via EMS with her daughter due to generally feeling unwell the last couple of days with fatigue, malaise, subjective fevers, chills, shortness of breath, productive cough, upper respiratory congestion, chest pain, tachycardia, and decreased oxygen saturation from baseline requiring increased nasal cannula supplementation. Ms. Waters also has complaints of dysuria. Daughter states that prior to ED arrival she had a hallucination of someone sitting on her bed that was not there, prompting her to activate EMS. Ms. Waters denies abdominal pain, N/V/D, constipation, hematuria, decreased UOP, changes in bowel habits or blood in stool, decreased appetite, changes in PO intake, light headiness, dizziness, or headaches.  She lives with her daughter, uses ambualtory aides as needed, and requires some assistance with ADLs.     In the ED she is hypertensive, heart rate 80-100s, saturation stable on 2-3 L nasal cannula, and T-max 102.3°.  CBC with WBC 32, H and H 10.4/33, platelets 376.  Chemistry NA 34, K  4.4, chloride 101, CO2 21, BUN 21, SCr 1.4, glucose 328.  Phosphorus 4.2, magnesium 1.7.  LFTs unremarkable.  POCT lactic 1.70.  Procalcitonin 1.34.  UA noninfectious.  Blood cultures in process.  EKG with sinus tach and inferolateral ST and T-wave abnormality similar to priors.  CXR with  abnormal opacities left perihilar and right upper lobe opacities which can be seen with multilobar infection in this patient with provided history of sepsis.     The patient was admitted to the Hospital Medicine Service for further evaluation and management.     Overview/Hospital Course:  Patient admitted for treatment of sepsis due to multifocal pneumonia and associated severe hyperglycemia.  She had good improvement in her mental status, fever and shortness of breath and antibiotics were changed to ceftriaxone/azithromycin the following day.    Interval History: Says she does not feel well, but finally was able to sleep last night after getting codeine.  Has a lot of rhonchi and is weak.  Overall better.    Review of Systems   Constitutional:  Negative for chills and fever.   Respiratory:  Positive for cough and shortness of breath.    Cardiovascular:  Negative for chest pain and palpitations.   Musculoskeletal:  Positive for arthralgias and back pain.     Objective:     Vital Signs (Most Recent):  Temp: 98.2 °F (36.8 °C) (04/03/25 1630)  Pulse: 82 (04/03/25 1630)  Resp: 16 (04/03/25 1630)  BP: (!) 146/67 (04/03/25 1630)  SpO2: 96 % (04/03/25 1630) Vital Signs (24h Range):  Temp:  [97.5 °F (36.4 °C)-98.5 °F (36.9 °C)] 98.2 °F (36.8 °C)  Pulse:  [77-89] 82  Resp:  [14-20] 16  SpO2:  [90 %-98 %] 96 %  BP: (133-146)/(59-67) 146/67     Weight: 98 kg (216 lb 0.8 oz)  Body mass index is 40.82 kg/m².    Intake/Output Summary (Last 24 hours) at 4/3/2025 1832  Last data filed at 4/3/2025 1600  Gross per 24 hour   Intake --   Output 1700 ml   Net -1700 ml         Physical Exam  Constitutional:       Appearance: She is ill-appearing.    Cardiovascular:      Rate and Rhythm: Normal rate and regular rhythm.      Heart sounds: Normal heart sounds. No murmur heard.     No gallop.   Pulmonary:      Effort: Pulmonary effort is normal.      Breath sounds: Rhonchi and rales present. No wheezing.   Abdominal:      General: Bowel sounds are normal.      Palpations: Abdomen is soft.   Skin:     General: Skin is warm and dry.   Neurological:      General: No focal deficit present.      Mental Status: She is alert.   Psychiatric:         Mood and Affect: Mood normal.         Behavior: Behavior normal.               Significant Labs: All pertinent labs within the past 24 hours have been reviewed.    Significant Imaging: I have reviewed all pertinent imaging results/findings within the past 24 hours.      Assessment & Plan  CAP (community acquired pneumonia); multilobular  Acute on chronic respiratory failure with hypoxia  On home oxygen therapy  COPD (chronic obstructive pulmonary disease)  CAFL (chronic airflow limitation)  Chronic bronchitis  Former smoker  -admitted due to community-acquired pneumonia, multilobular, in setting of known COPD, chronic airflow limitation, chronic respiratory failure on home oxygen, in former smoker  -at admission hypertensive otherwise hemoglobin stable and saturation stable on nasal cannula supplementation; T-max 102.3°   -ED workup detailed above however notable for WBC 32 and procalcitonin 1.34  -initiated on cefepime vancomycin in ED >>> good improvement overnight, change to ceftriaxone/azithromycin   -hold IV fluids at current D/T normal lactic and CHF history   -monitor for steroid need however wheezing minimal   -encourage PO intake as tolerated  -follow blood cultures   -sputum culture pending collection   -DuoNebs every 4 hours while awake   -incentive spirometer and flutter valve for pulmonary toileting   -PRN supportive care as indicated   -continue oxygen supplementation  -strict I&Os  -trend labs, address/replete  electrolytes as indicated  -Added guaifenesin during the day, codeine at night with improvement.  Requests melatonin.    Chronic combined systolic and diastolic congestive heart failure  Essential hypertension  -chronic, BP elevated at admission, some LE edema on physical exam  -most recent TTE 06/2024 with EF 40-50% mild pulmonary HTN with PASP 45  -holding IVF resuscitation as detailed above  -continue home losartan, metoprolol, and nifedipine with hold parameters   -holding home torsemide for now >>> resume when clinically appropriate  -dose/medication adjustment as appropriate   -strict I&Os   -trend labs, address/replete electrolytes as indicated    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 pectoris  Athscl heart disease of native coronary artery w/o ang pctrs  -history of   -[facility] 05/2022 detailed in HPI  -continue home ASA  -hold home fenofibrate and repatha for now >> resume at DC  -EKG PRN concerns     History of CVA (cerebrovascular accident)  Hemiplegia and hemiparesis following cerebral infarction affecting left non-dominant side  -history of   -continue home ASA  -hold home fenofibrate and repatha for now >> resume at DC  -fall precautions     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 disease  -chronic, not well controlled   -hemoglobin A1c 7.9/180 3/06/2025   -hold home Lantus 26 units BID and lispro 15 units with breakfast, 15 units with lunch, N 11 units with dinner plus low-dose SSI   -begin dose reduced basal and prandial insulin plus low-dose SSI  -dose/medication adjustment as appropriate   -monitor accuchecks AC/HS and PRN hypoglycemic protocol   -4/1 - change to home doses of basal insulin, increase prandial.  Persistently hyperglycemic, per daughter this is typical when she gets an infection.    Gastroesophageal reflux disease without esophagitis  -chronic  -PRN supportive care as indicated    Stage 3 chronic kidney disease  -chronic  -baseline SCr  1.1-1.2  -at admission SCr 1.4  -avoid nephrotoxins and hypotension as able, renally dose medications  -trend labs, address/replete electrolytes as indicated    Depression, unspecified  Generalized anxiety disorder  Recurrent major depressive disorder, in remission  -chronic  -continue home sertraline  -additional PRN supportive care as indicated     Chronic pain syndrome  Fibromyalgia  -chronic  -PRN supportive care for pain  -fall precautions      VTE Risk Mitigation (From admission, onward)           Ordered     enoxaparin injection 40 mg  Daily         03/31/25 1639     IP VTE HIGH RISK PATIENT  Once         03/31/25 1639     Place sequential compression device  Until discontinued         03/31/25 1639                    Discharge Planning   KYLE: 4/5/2025     Code Status: Full Code   Medical Readiness for Discharge Date:   Discharge Plan A: Home with family                        Annamarie Zuniga MD  Department of Hospital Medicine   [facility] ([address])

## 2025-04-03 NOTE — ASSESSMENT & PLAN NOTE
-history of   -[facility] 05/2022 detailed in HPI  -continue home ASA  -hold home fenofibrate and repatha for now >> resume at DC  -EKG PRN concerns

## 2025-04-03 NOTE — ASSESSMENT & PLAN NOTE
-chronic, BP elevated at admission, some LE edema on physical exam  -most recent TTE 06/2024 with EF 40-50% mild pulmonary HTN with PASP 45  -holding IVF resuscitation as detailed above  -continue home losartan, metoprolol, and nifedipine with hold parameters   -holding home torsemide for now >>> resume when clinically appropriate  -dose/medication adjustment as appropriate   -strict I&Os   -trend labs, address/replete electrolytes as indicated

## 2025-04-03 NOTE — PLAN OF CARE
Problem: Adult Inpatient Plan of Care  Goal: Plan of Care Review  Outcome: Not Progressing  Goal: Patient-Specific Goal (Individualized)  Outcome: Not Progressing  Goal: Absence of Hospital-Acquired Illness or Injury  Outcome: Not Progressing  Goal: Optimal Comfort and Wellbeing  Outcome: Not Progressing  Goal: Readiness for Transition of Care  Outcome: Not Progressing     Problem: Bariatric Environmental Safety  Goal: Safety Maintained with Care  Outcome: Not Progressing     Problem: Infection  Goal: Absence of Infection Signs and Symptoms  Outcome: Not Progressing     Problem: Diabetes Comorbidity  Goal: Blood Glucose Level Within Targeted Range  Outcome: Not Progressing     Problem: Pneumonia  Goal: Fluid Balance  Outcome: Not Progressing  Goal: Resolution of Infection Signs and Symptoms  Outcome: Not Progressing  Goal: Effective Oxygenation and Ventilation  Outcome: Not Progressing

## 2025-04-03 NOTE — ASSESSMENT & PLAN NOTE
-admitted due to community-acquired pneumonia, multilobular, in setting of known COPD, chronic airflow limitation, chronic respiratory failure on home oxygen, in former smoker  -at admission hypertensive otherwise hemoglobin stable and saturation stable on nasal cannula supplementation; T-max 102.3°   -ED workup detailed above however notable for WBC 32 and procalcitonin 1.34  -initiated on cefepime vancomycin in ED >>> good improvement overnight, change to ceftriaxone/azithromycin   -hold IV fluids at current D/T normal lactic and CHF history   -monitor for steroid need however wheezing minimal   -encourage PO intake as tolerated  -follow blood cultures   -sputum culture pending collection   -DuoNebs every 4 hours while awake   -incentive spirometer and flutter valve for pulmonary toileting   -PRN supportive care as indicated   -continue oxygen supplementation  -strict I&Os  -trend labs, address/replete electrolytes as indicated  -Added guaifenesin during the day, codeine at night with improvement.  Requests melatonin.

## 2025-04-03 NOTE — PLAN OF CARE
Medicare Message     Important Message from Medicare regarding Discharge Appeal Rights Explained to patient/caregiver; Signed/date by patient/caregiver Explained to patient/caregiver; Signed/date by patient/caregiver   Date IMM was signed 3/31/2025 4/3/2025   Time IMM was signed [id number]

## 2025-04-03 NOTE — SUBJECTIVE & OBJECTIVE
Interval History: Says she does not feel well, but finally was able to sleep last night after getting codeine.  Has a lot of rhonchi and is weak.  Overall better.    Review of Systems   Constitutional:  Negative for chills and fever.   Respiratory:  Positive for cough and shortness of breath.    Cardiovascular:  Negative for chest pain and palpitations.   Musculoskeletal:  Positive for arthralgias and back pain.     Objective:     Vital Signs (Most Recent):  Temp: 98.2 °F (36.8 °C) (04/03/25 1630)  Pulse: 82 (04/03/25 1630)  Resp: 16 (04/03/25 1630)  BP: (!) 146/67 (04/03/25 1630)  SpO2: 96 % (04/03/25 1630) Vital Signs (24h Range):  Temp:  [97.5 °F (36.4 °C)-98.5 °F (36.9 °C)] 98.2 °F (36.8 °C)  Pulse:  [77-89] 82  Resp:  [14-20] 16  SpO2:  [90 %-98 %] 96 %  BP: (133-146)/(59-67) 146/67     Weight: 98 kg (216 lb 0.8 oz)  Body mass index is 40.82 kg/m².    Intake/Output Summary (Last 24 hours) at 4/3/2025 1832  Last data filed at 4/3/2025 1600  Gross per 24 hour   Intake --   Output 1700 ml   Net -1700 ml         Physical Exam  Constitutional:       Appearance: She is ill-appearing.   Cardiovascular:      Rate and Rhythm: Normal rate and regular rhythm.      Heart sounds: Normal heart sounds. No murmur heard.     No gallop.   Pulmonary:      Effort: Pulmonary effort is normal.      Breath sounds: Rhonchi and rales present. No wheezing.   Abdominal:      General: Bowel sounds are normal.      Palpations: Abdomen is soft.   Skin:     General: Skin is warm and dry.   Neurological:      General: No focal deficit present.      Mental Status: She is alert.   Psychiatric:         Mood and Affect: Mood normal.         Behavior: Behavior normal.               Significant Labs: All pertinent labs within the past 24 hours have been reviewed.    Significant Imaging: I have reviewed all pertinent imaging results/findings within the past 24 hours.

## 2025-04-04 LAB
ABSOLUTE EOSINOPHIL (OHS): 0.77 K/UL
ABSOLUTE MONOCYTE (OHS): 1.18 K/UL (ref 0.3–1)
ABSOLUTE NEUTROPHIL COUNT (OHS): 10.22 K/UL (ref 1.8–7.7)
ALBUMIN SERPL BCP-MCNC: 2.5 G/DL (ref 3.5–5.2)
ALP SERPL-CCNC: 62 UNIT/L (ref 40–150)
ALT SERPL W/O P-5'-P-CCNC: 13 UNIT/L (ref 10–44)
ANION GAP (OHS): 10 MMOL/L (ref 8–16)
AST SERPL-CCNC: 15 UNIT/L (ref 11–45)
BACTERIA SPT CULT: NORMAL
BASOPHILS # BLD AUTO: 0.07 K/UL
BASOPHILS NFR BLD AUTO: 0.5 %
BILIRUB SERPL-MCNC: 0.2 MG/DL (ref 0.1–1)
BUN SERPL-MCNC: 22 MG/DL (ref 8–23)
CALCIUM SERPL-MCNC: 9.1 MG/DL (ref 8.7–10.5)
CHLORIDE SERPL-SCNC: 106 MMOL/L (ref 95–110)
CO2 SERPL-SCNC: 23 MMOL/L (ref 23–29)
CREAT SERPL-MCNC: 1.1 MG/DL (ref 0.5–1.4)
ERYTHROCYTE [DISTWIDTH] IN BLOOD BY AUTOMATED COUNT: 15.9 % (ref 11.5–14.5)
GFR SERPLBLD CREATININE-BSD FMLA CKD-EPI: 51 ML/MIN/1.73/M2
GLUCOSE SERPL-MCNC: 197 MG/DL (ref 70–110)
GRAM STN SPEC: NORMAL
HCT VFR BLD AUTO: 28.5 % (ref 37–48.5)
HGB BLD-MCNC: 9 GM/DL (ref 12–16)
IMM GRANULOCYTES # BLD AUTO: 0.2 K/UL (ref 0–0.04)
IMM GRANULOCYTES NFR BLD AUTO: 1.3 % (ref 0–0.5)
LYMPHOCYTES # BLD AUTO: 2.53 K/UL (ref 1–4.8)
MCH RBC QN AUTO: 27.5 PG (ref 27–31)
MCHC RBC AUTO-ENTMCNC: 31.6 G/DL (ref 32–36)
MCV RBC AUTO: 87 FL (ref 82–98)
NUCLEATED RBC (/100WBC) (OHS): 0 /100 WBC
PLATELET # BLD AUTO: 347 K/UL (ref 150–450)
PMV BLD AUTO: 11.3 FL (ref 9.2–12.9)
POCT GLUCOSE: 128 MG/DL (ref 70–110)
POCT GLUCOSE: 130 MG/DL (ref 70–110)
POCT GLUCOSE: 237 MG/DL (ref 70–110)
POCT GLUCOSE: 249 MG/DL (ref 70–110)
POCT GLUCOSE: 283 MG/DL (ref 70–110)
POTASSIUM SERPL-SCNC: 4.4 MMOL/L (ref 3.5–5.1)
PROT SERPL-MCNC: 6.5 GM/DL (ref 6–8.4)
RBC # BLD AUTO: 3.27 M/UL (ref 4–5.4)
RELATIVE EOSINOPHIL (OHS): 5.1 %
RELATIVE LYMPHOCYTE (OHS): 16.9 % (ref 18–48)
RELATIVE MONOCYTE (OHS): 7.9 % (ref 4–15)
RELATIVE NEUTROPHIL (OHS): 68.3 % (ref 38–73)
SODIUM SERPL-SCNC: 139 MMOL/L (ref 136–145)
WBC # BLD AUTO: 14.97 K/UL (ref 3.9–12.7)

## 2025-04-04 PROCEDURE — 85025 COMPLETE CBC W/AUTO DIFF WBC: CPT | Performed by: HOSPITALIST

## 2025-04-04 PROCEDURE — 27000646 HC AEROBIKA DEVICE

## 2025-04-04 PROCEDURE — 94664 DEMO&/EVAL PT USE INHALER: CPT

## 2025-04-04 PROCEDURE — 11000001 HC ACUTE MED/SURG PRIVATE ROOM

## 2025-04-04 PROCEDURE — 21400001 HC TELEMETRY ROOM

## 2025-04-04 PROCEDURE — 63600175 PHARM REV CODE 636 W HCPCS: Performed by: HOSPITALIST

## 2025-04-04 PROCEDURE — 25000242 PHARM REV CODE 250 ALT 637 W/ HCPCS: Performed by: HOSPITALIST

## 2025-04-04 PROCEDURE — 27000221 HC OXYGEN, UP TO 24 HOURS

## 2025-04-04 PROCEDURE — 94640 AIRWAY INHALATION TREATMENT: CPT

## 2025-04-04 PROCEDURE — 25000003 PHARM REV CODE 250: Performed by: NURSE PRACTITIONER

## 2025-04-04 PROCEDURE — 80053 COMPREHEN METABOLIC PANEL: CPT | Performed by: HOSPITALIST

## 2025-04-04 PROCEDURE — 94799 UNLISTED PULMONARY SVC/PX: CPT

## 2025-04-04 PROCEDURE — 94761 N-INVAS EAR/PLS OXIMETRY MLT: CPT

## 2025-04-04 PROCEDURE — 99900035 HC TECH TIME PER 15 MIN (STAT)

## 2025-04-04 PROCEDURE — 25000003 PHARM REV CODE 250: Performed by: HOSPITALIST

## 2025-04-04 PROCEDURE — 97530 THERAPEUTIC ACTIVITIES: CPT

## 2025-04-04 PROCEDURE — 36415 COLL VENOUS BLD VENIPUNCTURE: CPT | Performed by: HOSPITALIST

## 2025-04-04 PROCEDURE — 97162 PT EVAL MOD COMPLEX 30 MIN: CPT

## 2025-04-04 PROCEDURE — 63600175 PHARM REV CODE 636 W HCPCS: Performed by: NURSE PRACTITIONER

## 2025-04-04 RX ADMIN — INSULIN ASPART 10 UNITS: 100 INJECTION, SOLUTION INTRAVENOUS; SUBCUTANEOUS at 12:04

## 2025-04-04 RX ADMIN — GUAIFENESIN 600 MG: 600 TABLET, EXTENDED RELEASE ORAL at 08:04

## 2025-04-04 RX ADMIN — IPRATROPIUM BROMIDE AND ALBUTEROL SULFATE 3 ML: 2.5; .5 SOLUTION RESPIRATORY (INHALATION) at 11:04

## 2025-04-04 RX ADMIN — SENNOSIDES AND DOCUSATE SODIUM 1 TABLET: 50; 8.6 TABLET ORAL at 08:04

## 2025-04-04 RX ADMIN — ALLOPURINOL 300 MG: 300 TABLET ORAL at 08:04

## 2025-04-04 RX ADMIN — SERTRALINE HYDROCHLORIDE 100 MG: 100 TABLET ORAL at 08:04

## 2025-04-04 RX ADMIN — IPRATROPIUM BROMIDE AND ALBUTEROL SULFATE 3 ML: 2.5; .5 SOLUTION RESPIRATORY (INHALATION) at 07:04

## 2025-04-04 RX ADMIN — CEFTRIAXONE 1 G: 1 INJECTION, POWDER, FOR SOLUTION INTRAMUSCULAR; INTRAVENOUS at 08:04

## 2025-04-04 RX ADMIN — ASPIRIN 81 MG: 81 TABLET, COATED ORAL at 08:04

## 2025-04-04 RX ADMIN — DICLOFENAC SODIUM 4 G: 10 GEL TOPICAL at 08:04

## 2025-04-04 RX ADMIN — IPRATROPIUM BROMIDE AND ALBUTEROL SULFATE 3 ML: 2.5; .5 SOLUTION RESPIRATORY (INHALATION) at 04:04

## 2025-04-04 RX ADMIN — HYDROCODONE BITARTRATE AND ACETAMINOPHEN 1 TABLET: 5; 325 TABLET ORAL at 03:04

## 2025-04-04 RX ADMIN — INSULIN GLARGINE 26 UNITS: 100 INJECTION, SOLUTION SUBCUTANEOUS at 09:04

## 2025-04-04 RX ADMIN — IPRATROPIUM BROMIDE AND ALBUTEROL SULFATE 3 ML: 2.5; .5 SOLUTION RESPIRATORY (INHALATION) at 08:04

## 2025-04-04 RX ADMIN — ONDANSETRON 8 MG: 2 INJECTION INTRAMUSCULAR; INTRAVENOUS at 01:04

## 2025-04-04 RX ADMIN — METHOCARBAMOL 500 MG: 500 TABLET ORAL at 09:04

## 2025-04-04 RX ADMIN — NIFEDIPINE 30 MG: 30 TABLET, FILM COATED, EXTENDED RELEASE ORAL at 09:04

## 2025-04-04 RX ADMIN — INSULIN ASPART 10 UNITS: 100 INJECTION, SOLUTION INTRAVENOUS; SUBCUTANEOUS at 08:04

## 2025-04-04 RX ADMIN — PROMETHAZINE HYDROCHLORIDE AND CODEINE PHOSPHATE 10 ML: 6.25; 1 SOLUTION ORAL at 09:04

## 2025-04-04 RX ADMIN — HYDROCODONE BITARTRATE AND ACETAMINOPHEN 1 TABLET: 5; 325 TABLET ORAL at 09:04

## 2025-04-04 RX ADMIN — INSULIN GLARGINE 26 UNITS: 100 INJECTION, SOLUTION SUBCUTANEOUS at 08:04

## 2025-04-04 RX ADMIN — PREGABALIN 150 MG: 75 CAPSULE ORAL at 09:04

## 2025-04-04 RX ADMIN — LOSARTAN POTASSIUM 50 MG: 50 TABLET, FILM COATED ORAL at 08:04

## 2025-04-04 RX ADMIN — METOPROLOL SUCCINATE ER TABLETS 100 MG: 50 TABLET, FILM COATED, EXTENDED RELEASE ORAL at 08:04

## 2025-04-04 RX ADMIN — PREGABALIN 150 MG: 75 CAPSULE ORAL at 08:04

## 2025-04-04 RX ADMIN — MELATONIN TAB 3 MG 6 MG: 3 TAB at 08:04

## 2025-04-04 RX ADMIN — HYDROCODONE BITARTRATE AND ACETAMINOPHEN 1 TABLET: 5; 325 TABLET ORAL at 01:04

## 2025-04-04 RX ADMIN — ENOXAPARIN SODIUM 40 MG: 40 INJECTION SUBCUTANEOUS at 06:04

## 2025-04-04 NOTE — ASSESSMENT & PLAN NOTE
-admitted due to community-acquired pneumonia, multilobular, in setting of known COPD, chronic airflow limitation, chronic respiratory failure on home oxygen, in former smoker  -at admission hypertensive otherwise hemoglobin stable and saturation stable on nasal cannula supplementation; T-max 102.3°   -ED workup detailed above however notable for WBC 32 and procalcitonin 1.34  -initiated on cefepime vancomycin in ED >>> good improvement overnight, change to ceftriaxone/azithromycin   -hold IV fluids at current D/T normal lactic and CHF history   -monitor for steroid need however wheezing minimal   -encourage PO intake as tolerated  -follow blood cultures   -sputum culture pending collection   -DuoNebs every 4 hours while awake   -incentive spirometer and flutter valve for pulmonary toileting - needs encouragement to use.  -PRN supportive care as indicated   -continue oxygen supplementation  -strict I&Os  -trend labs, address/replete electrolytes as indicated  -Added guaifenesin during the day, codeine at night with improvement.  Requests melatonin.  - PT/OT consult due to deconditioning in setting of COPD and acute illness

## 2025-04-04 NOTE — SUBJECTIVE & OBJECTIVE
"Interval History: She is sleeping well at night but feeling very ill in general, deconditioned.  Coughing a lot, has IS and Aerobika at bedside but not using very much.  Has been in bed most of the time.  Daughter asking about "light PT."    Review of Systems   Constitutional:  Negative for chills and fever.   Respiratory:  Positive for cough and shortness of breath.    Cardiovascular:  Negative for chest pain and palpitations.   Musculoskeletal:  Positive for arthralgias and back pain.     Objective:     Vital Signs (Most Recent):  Temp: 98.3 °F (36.8 °C) (04/04/25 1123)  Pulse: 90 (04/04/25 1142)  Resp: 16 (04/04/25 1359)  BP: (!) 159/77 (04/04/25 1123)  SpO2: 95 % (04/04/25 1142) Vital Signs (24h Range):  Temp:  [97.5 °F (36.4 °C)-98.3 °F (36.8 °C)] 98.3 °F (36.8 °C)  Pulse:  [78-90] 90  Resp:  [12-18] 16  SpO2:  [93 %-98 %] 95 %  BP: (142-161)/(63-77) 159/77     Weight: 98 kg (216 lb 0.8 oz)  Body mass index is 40.82 kg/m².    Intake/Output Summary (Last 24 hours) at 4/4/2025 1546  Last data filed at 4/4/2025 1140  Gross per 24 hour   Intake 1180 ml   Output 2850 ml   Net -1670 ml         Physical Exam  Constitutional:       Appearance: She is ill-appearing.   Cardiovascular:      Rate and Rhythm: Normal rate and regular rhythm.      Heart sounds: Normal heart sounds. No murmur heard.     No gallop.   Pulmonary:      Effort: Pulmonary effort is normal.      Breath sounds: Rhonchi and rales present. No wheezing.   Abdominal:      General: Bowel sounds are normal.      Palpations: Abdomen is soft.   Skin:     General: Skin is warm and dry.   Neurological:      General: No focal deficit present.      Mental Status: She is alert.   Psychiatric:         Mood and Affect: Mood normal.         Behavior: Behavior normal.               Significant Labs: All pertinent labs within the past 24 hours have been reviewed.    Significant Imaging: I have reviewed all pertinent imaging results/findings within the past 24 hours.  "

## 2025-04-04 NOTE — ASSESSMENT & PLAN NOTE
-history of   -[facility] 05/2022 detailed in HPI  -continue home ASA  -hold home fenofibrate and repatha for now >> resume at DC  -EKG PRN concerns

## 2025-04-04 NOTE — ASSESSMENT & PLAN NOTE
-history of   -[facility] 05/2022 detailed in HPI  -continue home ASA  -hold home fenofibrate and repatha for now >> resume at DC  -EKG PRN concerns

## 2025-04-04 NOTE — PLAN OF CARE
"D/C Recommendation- Low Intensity Therapy  DME Recommendation- has needed DME.    Evaluation completed this date. Pt PLOF is Mod I w straight cane and uses tub bench for bathing and rollator for "exercising". Pt currently performing bed mobility w HOB elevated and use of bed rails with SBA. Transferred to BSC w Min A and increased time. Returned to supine in bed w Mod A for ADRY LE management. At this time, pt is currently performing below her baseline and would benefit from acute PT services to increase her level of independence before discharging.     Problem: Physical Therapy  Goal: Physical Therapy Goal  Description: P.T goals to be met in 2 weeks as follows:  1. Mod I Bed Mobility  2. Mod I T/F w LRAD  3. Gait 150' Mod I with LRAD  4. Pt will ascend/descend 4 steps with Min A    Outcome: Progressing     "

## 2025-04-04 NOTE — PT/OT/SLP PROGRESS
Occupational Therapy      Patient Name:  Indira Waters   MRN:  [id number]    Patient not seen today secondary to Patient fatigue, Nurse/ PRANAY hold (Nursing asking to come back this afternoon due to pt not sleeping well last night). Will follow-up as able.    4/4/2025

## 2025-04-04 NOTE — PROGRESS NOTES
[facility] Surg [address] Medicine  Progress Note    Patient Name: Indira Waters  MRN: [id number]  Patient Class: IP- Inpatient   Admission Date: 3/31/2025  Length of Stay: 4 days  Attending Physician: Annamarie Scott MD  Primary Care Provider: Chun Zapata MD        Subjective     Principal Problem:CAP (community acquired pneumonia)        HPI:  HPi by Rina Blanc NP:  Ms. Waters is a 80 YOF with PMHx of HFrEF (EF 40-50% 06/2024), CAD ([facility] 05/2022 with severe 2 vessel, predominantly distal, disease), aortic atherosclerosis with tortuous aorta, HTN, HLD, DM type II, diabetic polyneuropathy,COPD, chronic respiratory insuffiencey/chronic airflow limitation on home oxygen qHS , former smoker, CKD III (baseline SCr 1.1-1.2), chronic anemia, history of CVA with residual left-sided deficits, GERD, fibromyalgia, chronic pain, osteoarthritis, anxiety/depression, and obesity.     She presents to ED via EMS with her daughter due to generally feeling unwell the last couple of days with fatigue, malaise, subjective fevers, chills, shortness of breath, productive cough, upper respiratory congestion, chest pain, tachycardia, and decreased oxygen saturation from baseline requiring increased nasal cannula supplementation. Ms. Waters also has complaints of dysuria. Daughter states that prior to ED arrival she had a hallucination of someone sitting on her bed that was not there, prompting her to activate EMS. Ms. Waters denies abdominal pain, N/V/D, constipation, hematuria, decreased UOP, changes in bowel habits or blood in stool, decreased appetite, changes in PO intake, light headiness, dizziness, or headaches.  She lives with her daughter, uses ambualtory aides as needed, and requires some assistance with ADLs.     In the ED she is hypertensive, heart rate 80-100s, saturation stable on 2-3 L nasal cannula, and T-max 102.3°.  CBC with WBC 32, H and H 10.4/33, platelets 376.  Chemistry NA 34, K  "4.4, chloride 101, CO2 21, BUN 21, SCr 1.4, glucose 328.  Phosphorus 4.2, magnesium 1.7.  LFTs unremarkable.  POCT lactic 1.70.  Procalcitonin 1.34.  UA noninfectious.  Blood cultures in process.  EKG with sinus tach and inferolateral ST and T-wave abnormality similar to priors.  CXR with  abnormal opacities left perihilar and right upper lobe opacities which can be seen with multilobar infection in this patient with provided history of sepsis.     The patient was admitted to the Hospital Medicine Service for further evaluation and management.     Overview/Hospital Course:  Patient admitted for treatment of sepsis due to multifocal pneumonia and associated severe hyperglycemia.  She had good improvement in her mental status, fever and shortness of breath and antibiotics were changed to ceftriaxone/azithromycin the following day.  She was on guaifenesin during the day for cough and a cough suppressant at night so she could rest.  PT/OT were consulted due to her poor conditioning and debility.    Interval History: She is sleeping well at night but feeling very ill in general, deconditioned.  Coughing a lot, has IS and Aerobika at bedside but not using very much.  Has been in bed most of the time.  Daughter asking about "light PT."    Review of Systems   Constitutional:  Negative for chills and fever.   Respiratory:  Positive for cough and shortness of breath.    Cardiovascular:  Negative for chest pain and palpitations.   Musculoskeletal:  Positive for arthralgias and back pain.     Objective:     Vital Signs (Most Recent):  Temp: 98.3 °F (36.8 °C) (04/04/25 1123)  Pulse: 90 (04/04/25 1142)  Resp: 16 (04/04/25 1359)  BP: (!) 159/77 (04/04/25 1123)  SpO2: 95 % (04/04/25 1142) Vital Signs (24h Range):  Temp:  [97.5 °F (36.4 °C)-98.3 °F (36.8 °C)] 98.3 °F (36.8 °C)  Pulse:  [78-90] 90  Resp:  [12-18] 16  SpO2:  [93 %-98 %] 95 %  BP: (142-161)/(63-77) 159/77     Weight: 98 kg (216 lb 0.8 oz)  Body mass index is 40.82 " kg/m².    Intake/Output Summary (Last 24 hours) at 4/4/2025 1546  Last data filed at 4/4/2025 1140  Gross per 24 hour   Intake 1180 ml   Output 2850 ml   Net -1670 ml         Physical Exam  Constitutional:       Appearance: She is ill-appearing.   Cardiovascular:      Rate and Rhythm: Normal rate and regular rhythm.      Heart sounds: Normal heart sounds. No murmur heard.     No gallop.   Pulmonary:      Effort: Pulmonary effort is normal.      Breath sounds: Rhonchi and rales present. No wheezing.   Abdominal:      General: Bowel sounds are normal.      Palpations: Abdomen is soft.   Skin:     General: Skin is warm and dry.   Neurological:      General: No focal deficit present.      Mental Status: She is alert.   Psychiatric:         Mood and Affect: Mood normal.         Behavior: Behavior normal.               Significant Labs: All pertinent labs within the past 24 hours have been reviewed.    Significant Imaging: I have reviewed all pertinent imaging results/findings within the past 24 hours.      Assessment & Plan  CAP (community acquired pneumonia); multilobular  Acute on chronic respiratory failure with hypoxia  On home oxygen therapy  COPD (chronic obstructive pulmonary disease)  CAFL (chronic airflow limitation)  Chronic bronchitis  Former smoker  -admitted due to community-acquired pneumonia, multilobular, in setting of known COPD, chronic airflow limitation, chronic respiratory failure on home oxygen, in former smoker  -at admission hypertensive otherwise hemoglobin stable and saturation stable on nasal cannula supplementation; T-max 102.3°   -ED workup detailed above however notable for WBC 32 and procalcitonin 1.34  -initiated on cefepime vancomycin in ED >>> good improvement overnight, change to ceftriaxone/azithromycin   -hold IV fluids at current D/T normal lactic and CHF history   -monitor for steroid need however wheezing minimal   -encourage PO intake as tolerated  -follow blood cultures   -sputum  culture pending collection   -DuoNebs every 4 hours while awake   -incentive spirometer and flutter valve for pulmonary toileting - needs encouragement to use.  -PRN supportive care as indicated   -continue oxygen supplementation  -strict I&Os  -trend labs, address/replete electrolytes as indicated  -Added guaifenesin during the day, codeine at night with improvement.  Requests melatonin.  - PT/OT consult due to deconditioning in setting of COPD and acute illness    Chronic combined systolic and diastolic congestive heart failure  Essential hypertension  -chronic, BP elevated at admission, some LE edema on physical exam  -most recent TTE 06/2024 with EF 40-50% mild pulmonary HTN with PASP 45  -holding IVF resuscitation as detailed above  -continue home losartan, metoprolol, and nifedipine with hold parameters   -holding home torsemide for now >>> resume when clinically appropriate  -dose/medication adjustment as appropriate   -strict I&Os   -trend labs, address/replete electrolytes as indicated    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 pectoris  Athscl heart disease of native coronary artery w/o ang pctrs  -history of   -[facility] 05/2022 detailed in HPI  -continue home ASA  -hold home fenofibrate and repatha for now >> resume at DC  -EKG PRN concerns     History of CVA (cerebrovascular accident)  Hemiplegia and hemiparesis following cerebral infarction affecting left non-dominant side  -history of   -continue home ASA  -hold home fenofibrate and repatha for now >> resume at DC  -fall precautions     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 disease  -chronic, not well controlled   -hemoglobin A1c 7.9/180 3/06/2025   -hold home Lantus 26 units BID and lispro 15 units with breakfast, 15 units with lunch, N 11 units with dinner plus low-dose SSI   -begin dose reduced basal and prandial insulin plus low-dose SSI  -dose/medication adjustment as appropriate   -monitor accuchecks  AC/HS and PRN hypoglycemic protocol   -4/1 - change to home doses of basal insulin, increase prandial.  Persistently hyperglycemic, per daughter this is typical when she gets an infection.    Gastroesophageal reflux disease without esophagitis  -chronic  -PRN supportive care as indicated    Stage 3 chronic kidney disease  -chronic  -baseline SCr 1.1-1.2  -at admission SCr 1.4  -avoid nephrotoxins and hypotension as able, renally dose medications  -trend labs, address/replete electrolytes as indicated    Depression, unspecified  Generalized anxiety disorder  Recurrent major depressive disorder, in remission  -chronic  -continue home sertraline  -additional PRN supportive care as indicated     Chronic pain syndrome  Fibromyalgia  -chronic  -PRN supportive care for pain  -fall precautions      VTE Risk Mitigation (From admission, onward)           Ordered     enoxaparin injection 40 mg  Daily         03/31/25 1639     IP VTE HIGH RISK PATIENT  Once         03/31/25 1639     Place sequential compression device  Until discontinued         03/31/25 1639                    Discharge Planning   KYLE: 4/5/2025     Code Status: Full Code   Medical Readiness for Discharge Date:   Discharge Plan A: Home with family                Please place Justification for DME        Annamarie Zuniga MD  Department of Hospital Medicine   [facility] ([address])

## 2025-04-04 NOTE — PLAN OF CARE
Problem: Adult Inpatient Plan of Care  Goal: Plan of Care Review  Outcome: Progressing     Problem: Adult Inpatient Plan of Care  Goal: Absence of Hospital-Acquired Illness or Injury  Outcome: Progressing     Problem: Adult Inpatient Plan of Care  Goal: Patient-Specific Goal (Individualized)  Outcome: Progressing     Problem: Bariatric Environmental Safety  Goal: Safety Maintained with Care  Outcome: Progressing     Problem: Diabetes Comorbidity  Goal: Blood Glucose Level Within Targeted Range  Outcome: Progressing     Problem: Pneumonia  Goal: Fluid Balance  Outcome: Progressing     Problem: Pneumonia  Goal: Resolution of Infection Signs and Symptoms  Outcome: Progressing     Problem: Pneumonia  Goal: Effective Oxygenation and Ventilation  Outcome: Progressing

## 2025-04-04 NOTE — PT/OT/SLP EVAL
"Physical Therapy Evaluation    Patient Name:  Indira Waters   MRN:  [id number]    Recommendations:     Discharge Recommendations: Low Intensity Therapy   Discharge Equipment Recommendations: none   Barriers to discharge:  mobility limitations    Assessment:     Indira Waters is a 80 y.o. female admitted with a medical diagnosis of CAP (community acquired pneumonia).  She presents with the following impairments/functional limitations: weakness, impaired endurance, impaired functional mobility, impaired balance, decreased coordination .  Pt PLOF is Mod I w straight cane and uses tub bench for bathing and rollator for "exercising". Pt currently performing bed mobility w HOB elevated and use of bed rails with SBA. Transferred to C w Min A and increased time. Returned to supine in bed w Mod A for ADRY LE management. At this time, pt is currently performing below her baseline and would benefit from acute PT services to increase her level of independence before discharging.     Rehab Prognosis: Good; patient would benefit from acute skilled PT services to address these deficits and reach maximum level of function.    Recent Surgery: * No surgery found *      Plan:     During this hospitalization, patient to be seen 5 x/week to address the identified rehab impairments via therapeutic activities, therapeutic exercises, neuromuscular re-education and progress toward the following goals:    Plan of Care Expires:  04/18/25    Subjective     Chief Complaint: pt reporting that she may need to use the bathroom.   Patient/Family Comments/goals: agreeable to PT evaluation. Daughter at bedside.   Pain/Comfort:  Pain Rating 1: 0/10    Patients cultural, spiritual, Latter day conflicts given the current situation: no    Living Environment:  [facility] w [address]. Lives w daughter.  Prior to admission, patients level of function was Mod I w ambulation w straight cane but required some assistance for ADLs.  Equipment used at home: " cane, straight, bath bench, rollator.  DME owned (not currently used): none.  Upon discharge, patient will have assistance from daughter.    Objective:     Communicated with RN prior to session.  Patient found supine with oxygen, peripheral IV, PureWick (nasal cannula)  upon PT entry to room.    General Precautions: Standard,    Orthopedic Precautions:    Braces:    Respiratory Status: Nasal cannula, flow 2 L/min    Exams:  RLE ROM: WFL  RLE Strength: 4/5 grossly  LLE ROM: WFL  LLE Strength: 4/5 grossly    Functional Mobility:  Bed Mobility:  Rolling Right: stand by assistance  Scooting: stand by assistance  Bridging: stand by assistance and but unable to achieve full clearance of bottom.   Supine to Sit: stand by assistance  Sit to Supine: moderate assistance and for ADRY LE management.   Transfers:  Sit to Stand:  minimum assistance with hand held assist  Toilet Transfer: minimum assistance with  hand held assist  using  Stand Pivot  Gait: 2' to BSC w 1 HHA and Min A for balance.       AM-PAC 6 CLICK MOBILITY  Total Score:18       Treatment & Education:  Education provided regarding PT role, PT POC, bed mobility, t/f training to BSC.   Static sitting balance at EOB x5 min w 1UE support on bed rail  Static standing x1 min w 1HHA  Static standing w 2HHA while daughter cleaning pt following toileting.   Mod A for LE management when transitioning from seated EOB to supine.   Pt bridging and pulling self to HOB w SBA.     Patient left supine with all lines intact, call button in reach, and daughter at bedside.  .    GOALS:   Multidisciplinary Problems       Physical Therapy Goals          Problem: Physical Therapy    Goal Priority Disciplines Outcome Interventions   Physical Therapy Goal     PT, PT/OT Progressing    Description: P.T goals to be met in 2 weeks as follows:  1. Mod I Bed Mobility  2. Mod I T/F w LRAD  3. Gait 150' Mod I with LRAD  4. Pt will ascend/descend 4 steps with Min A                         DME  Justifications:  No DME recommended requiring DME justifications    History:     Past Medical History:   Diagnosis Date    Arthritis     Back pain     Cancer     ovarian    Cervical cancer     Coronary artery disease     Depression     Diabetes mellitus     Fibromyalgia     Heart attack     History of MI (myocardial infarction)     Hyperlipidemia     Hypertension     Lupus     Stroke     slight left sided weakness       Past Surgical History:   Procedure Laterality Date    APPENDECTOMY       SECTION      2    CORONARY ANGIOGRAPHY N/A 2022    Procedure: ANGIOGRAM, CORONARY ARTERY;  Surgeon: Jose L Méndez MD;  Location: [facility] CATH LAB;  Service: Cardiology;  Laterality: N/A;    HYSTERECTOMY      with USO for cervical cancer    INJECTION OF ANESTHETIC AGENT AROUND NERVE Bilateral 2021    Procedure: BLOCK, NERVE, SYMPATHIC;  Surgeon: Holden Pereira MD;  Location: [facility] PAIN MGT;  Service: Pain Management;  Laterality: Bilateral;    INJECTION OF ANESTHETIC AGENT AROUND NERVE N/A 2021    Procedure: BLOCK, NERVE, SYMPATHETIC  need consent;  Surgeon: Holden Pereira MD;  Location: [facility] PAIN MGT;  Service: Pain Management;  Laterality: N/A;    VA FARIBA,SWALLOW FUNCTION,CINE/VIDEO RECORD  2021         TONSILLECTOMY      TRIAL OF SPINAL CORD NERVE STIMULATOR N/A 3/8/2023    Procedure: LUMBAR SPINAL CORD STIMULATOR TRIAL NEVRO REP PATIENT STATES SHE NO LONGER TAKES PLAVIX;  Surgeon: Holden Pereira MD;  Location: [facility] PAIN MGT;  Service: Pain Management;  Laterality: N/A;       Time Tracking:     PT Received On: 25  PT Start Time: 1311     PT Stop Time: 1347  PT Total Time (min): 36 min     Billable Minutes: Evaluation 20 and Therapeutic Activity 16      2025

## 2025-04-05 LAB
ABSOLUTE EOSINOPHIL (OHS): 0.8 K/UL
ABSOLUTE MONOCYTE (OHS): 1.02 K/UL (ref 0.3–1)
ABSOLUTE NEUTROPHIL COUNT (OHS): 9.45 K/UL (ref 1.8–7.7)
ALBUMIN SERPL BCP-MCNC: 2.4 G/DL (ref 3.5–5.2)
ALP SERPL-CCNC: 65 UNIT/L (ref 40–150)
ALT SERPL W/O P-5'-P-CCNC: 13 UNIT/L (ref 10–44)
ANION GAP (OHS): 8 MMOL/L (ref 8–16)
AST SERPL-CCNC: 13 UNIT/L (ref 11–45)
BACTERIA BLD CULT: NORMAL
BACTERIA BLD CULT: NORMAL
BASOPHILS # BLD AUTO: 0.05 K/UL
BASOPHILS NFR BLD AUTO: 0.4 %
BILIRUB SERPL-MCNC: 0.2 MG/DL (ref 0.1–1)
BUN SERPL-MCNC: 23 MG/DL (ref 8–23)
CALCIUM SERPL-MCNC: 8.9 MG/DL (ref 8.7–10.5)
CHLORIDE SERPL-SCNC: 104 MMOL/L (ref 95–110)
CO2 SERPL-SCNC: 24 MMOL/L (ref 23–29)
CREAT SERPL-MCNC: 1.1 MG/DL (ref 0.5–1.4)
ERYTHROCYTE [DISTWIDTH] IN BLOOD BY AUTOMATED COUNT: 15.6 % (ref 11.5–14.5)
GFR SERPLBLD CREATININE-BSD FMLA CKD-EPI: 51 ML/MIN/1.73/M2
GLUCOSE SERPL-MCNC: 258 MG/DL (ref 70–110)
HCT VFR BLD AUTO: 27.9 % (ref 37–48.5)
HGB BLD-MCNC: 8.7 GM/DL (ref 12–16)
IMM GRANULOCYTES # BLD AUTO: 0.25 K/UL (ref 0–0.04)
IMM GRANULOCYTES NFR BLD AUTO: 1.8 % (ref 0–0.5)
LYMPHOCYTES # BLD AUTO: 2.47 K/UL (ref 1–4.8)
MCH RBC QN AUTO: 27.1 PG (ref 27–31)
MCHC RBC AUTO-ENTMCNC: 31.2 G/DL (ref 32–36)
MCV RBC AUTO: 87 FL (ref 82–98)
NUCLEATED RBC (/100WBC) (OHS): 0 /100 WBC
PLATELET # BLD AUTO: 341 K/UL (ref 150–450)
PMV BLD AUTO: 11.1 FL (ref 9.2–12.9)
POCT GLUCOSE: 277 MG/DL (ref 70–110)
POCT GLUCOSE: 284 MG/DL (ref 70–110)
POCT GLUCOSE: 307 MG/DL (ref 70–110)
POCT GLUCOSE: 339 MG/DL (ref 70–110)
POTASSIUM SERPL-SCNC: 4.4 MMOL/L (ref 3.5–5.1)
PROT SERPL-MCNC: 6.4 GM/DL (ref 6–8.4)
RBC # BLD AUTO: 3.21 M/UL (ref 4–5.4)
RELATIVE EOSINOPHIL (OHS): 5.7 %
RELATIVE LYMPHOCYTE (OHS): 17.6 % (ref 18–48)
RELATIVE MONOCYTE (OHS): 7.3 % (ref 4–15)
RELATIVE NEUTROPHIL (OHS): 67.2 % (ref 38–73)
SODIUM SERPL-SCNC: 136 MMOL/L (ref 136–145)
WBC # BLD AUTO: 14.04 K/UL (ref 3.9–12.7)

## 2025-04-05 PROCEDURE — 21400001 HC TELEMETRY ROOM

## 2025-04-05 PROCEDURE — 99900035 HC TECH TIME PER 15 MIN (STAT)

## 2025-04-05 PROCEDURE — 27000221 HC OXYGEN, UP TO 24 HOURS

## 2025-04-05 PROCEDURE — 85025 COMPLETE CBC W/AUTO DIFF WBC: CPT | Performed by: HOSPITALIST

## 2025-04-05 PROCEDURE — 36415 COLL VENOUS BLD VENIPUNCTURE: CPT | Performed by: HOSPITALIST

## 2025-04-05 PROCEDURE — 11000001 HC ACUTE MED/SURG PRIVATE ROOM

## 2025-04-05 PROCEDURE — 25000003 PHARM REV CODE 250: Performed by: HOSPITALIST

## 2025-04-05 PROCEDURE — 25000242 PHARM REV CODE 250 ALT 637 W/ HCPCS: Performed by: HOSPITALIST

## 2025-04-05 PROCEDURE — 63600175 PHARM REV CODE 636 W HCPCS: Performed by: NURSE PRACTITIONER

## 2025-04-05 PROCEDURE — 94799 UNLISTED PULMONARY SVC/PX: CPT

## 2025-04-05 PROCEDURE — 80053 COMPREHEN METABOLIC PANEL: CPT | Performed by: HOSPITALIST

## 2025-04-05 PROCEDURE — 94640 AIRWAY INHALATION TREATMENT: CPT

## 2025-04-05 PROCEDURE — 25000003 PHARM REV CODE 250: Performed by: NURSE PRACTITIONER

## 2025-04-05 PROCEDURE — 63600175 PHARM REV CODE 636 W HCPCS: Performed by: HOSPITALIST

## 2025-04-05 PROCEDURE — 94664 DEMO&/EVAL PT USE INHALER: CPT

## 2025-04-05 PROCEDURE — 94761 N-INVAS EAR/PLS OXIMETRY MLT: CPT

## 2025-04-05 RX ORDER — BISACODYL 10 MG/1
10 SUPPOSITORY RECTAL ONCE
Status: COMPLETED | OUTPATIENT
Start: 2025-04-05 | End: 2025-04-05

## 2025-04-05 RX ADMIN — MELATONIN TAB 3 MG 6 MG: 3 TAB at 08:04

## 2025-04-05 RX ADMIN — NIFEDIPINE 30 MG: 30 TABLET, FILM COATED, EXTENDED RELEASE ORAL at 08:04

## 2025-04-05 RX ADMIN — GUAIFENESIN 600 MG: 600 TABLET, EXTENDED RELEASE ORAL at 08:04

## 2025-04-05 RX ADMIN — PREGABALIN 150 MG: 75 CAPSULE ORAL at 08:04

## 2025-04-05 RX ADMIN — METHOCARBAMOL 500 MG: 500 TABLET ORAL at 02:04

## 2025-04-05 RX ADMIN — SERTRALINE HYDROCHLORIDE 100 MG: 100 TABLET ORAL at 08:04

## 2025-04-05 RX ADMIN — SENNOSIDES AND DOCUSATE SODIUM 1 TABLET: 50; 8.6 TABLET ORAL at 08:04

## 2025-04-05 RX ADMIN — METOPROLOL SUCCINATE ER TABLETS 100 MG: 50 TABLET, FILM COATED, EXTENDED RELEASE ORAL at 08:04

## 2025-04-05 RX ADMIN — LACTULOSE 20 G: 20 SOLUTION ORAL at 02:04

## 2025-04-05 RX ADMIN — DICLOFENAC SODIUM 4 G: 10 GEL TOPICAL at 08:04

## 2025-04-05 RX ADMIN — HYDROCODONE BITARTRATE AND ACETAMINOPHEN 1 TABLET: 5; 325 TABLET ORAL at 02:04

## 2025-04-05 RX ADMIN — LACTULOSE 20 G: 20 SOLUTION ORAL at 08:04

## 2025-04-05 RX ADMIN — INSULIN ASPART 10 UNITS: 100 INJECTION, SOLUTION INTRAVENOUS; SUBCUTANEOUS at 12:04

## 2025-04-05 RX ADMIN — HYDROCODONE BITARTRATE AND ACETAMINOPHEN 1 TABLET: 5; 325 TABLET ORAL at 08:04

## 2025-04-05 RX ADMIN — BISACODYL 10 MG: 10 SUPPOSITORY RECTAL at 12:04

## 2025-04-05 RX ADMIN — IPRATROPIUM BROMIDE AND ALBUTEROL SULFATE 3 ML: 2.5; .5 SOLUTION RESPIRATORY (INHALATION) at 08:04

## 2025-04-05 RX ADMIN — IPRATROPIUM BROMIDE AND ALBUTEROL SULFATE 3 ML: 2.5; .5 SOLUTION RESPIRATORY (INHALATION) at 04:04

## 2025-04-05 RX ADMIN — ENOXAPARIN SODIUM 40 MG: 40 INJECTION SUBCUTANEOUS at 05:04

## 2025-04-05 RX ADMIN — IPRATROPIUM BROMIDE AND ALBUTEROL SULFATE 3 ML: 2.5; .5 SOLUTION RESPIRATORY (INHALATION) at 07:04

## 2025-04-05 RX ADMIN — METHOCARBAMOL 500 MG: 500 TABLET ORAL at 08:04

## 2025-04-05 RX ADMIN — LOSARTAN POTASSIUM 50 MG: 50 TABLET, FILM COATED ORAL at 08:04

## 2025-04-05 RX ADMIN — IPRATROPIUM BROMIDE AND ALBUTEROL SULFATE 3 ML: 2.5; .5 SOLUTION RESPIRATORY (INHALATION) at 11:04

## 2025-04-05 RX ADMIN — INSULIN GLARGINE 26 UNITS: 100 INJECTION, SOLUTION SUBCUTANEOUS at 08:04

## 2025-04-05 RX ADMIN — INSULIN ASPART 10 UNITS: 100 INJECTION, SOLUTION INTRAVENOUS; SUBCUTANEOUS at 08:04

## 2025-04-05 RX ADMIN — ASPIRIN 81 MG: 81 TABLET, COATED ORAL at 08:04

## 2025-04-05 RX ADMIN — PROMETHAZINE HYDROCHLORIDE AND CODEINE PHOSPHATE 10 ML: 6.25; 1 SOLUTION ORAL at 08:04

## 2025-04-05 RX ADMIN — HYDROCODONE BITARTRATE AND ACETAMINOPHEN 1 TABLET: 5; 325 TABLET ORAL at 05:04

## 2025-04-05 RX ADMIN — INSULIN ASPART 10 UNITS: 100 INJECTION, SOLUTION INTRAVENOUS; SUBCUTANEOUS at 05:04

## 2025-04-05 RX ADMIN — CEFTRIAXONE 1 G: 1 INJECTION, POWDER, FOR SOLUTION INTRAMUSCULAR; INTRAVENOUS at 08:04

## 2025-04-05 RX ADMIN — METHYLPREDNISOLONE SODIUM SUCCINATE 80 MG: 40 INJECTION, POWDER, FOR SOLUTION INTRAMUSCULAR; INTRAVENOUS at 10:04

## 2025-04-05 RX ADMIN — ALLOPURINOL 300 MG: 300 TABLET ORAL at 08:04

## 2025-04-05 NOTE — PT/OT/SLP PROGRESS
Physical Therapy      Patient Name:  Indira Waters   MRN:  [id number]    Patient not seen today secondary to Patient unwilling to participate, stating she hadn't slept last  night and felt terrible. Will follow-up later today as scheduling permits.    ADDENDUM 12:07pm - pt still not feeling up to therapy. Offered to t/f pt to chair for lunch, pt declined. Will try again if time permits

## 2025-04-05 NOTE — ASSESSMENT & PLAN NOTE
-history of   -[facility] 05/2022 detailed in HPI  -continue home ASA  -hold home fenofibrate and repatha for now >> resume at DC  -EKG PRN concerns

## 2025-04-05 NOTE — PLAN OF CARE
Problem: Adult Inpatient Plan of Care  Goal: Plan of Care Review  Outcome: Progressing     Problem: Adult Inpatient Plan of Care  Goal: Patient-Specific Goal (Individualized)  Outcome: Progressing     Problem: Adult Inpatient Plan of Care  Goal: Optimal Comfort and Wellbeing  Outcome: Progressing     Problem: Adult Inpatient Plan of Care  Goal: Readiness for Transition of Care  Outcome: Progressing     Problem: Infection  Goal: Absence of Infection Signs and Symptoms  Outcome: Progressing     Problem: Diabetes Comorbidity  Goal: Blood Glucose Level Within Targeted Range  Outcome: Progressing     Problem: Pneumonia  Goal: Fluid Balance  Outcome: Progressing     Problem: Pneumonia  Goal: Resolution of Infection Signs and Symptoms  Outcome: Progressing

## 2025-04-05 NOTE — PT/OT/SLP PROGRESS
Occupational Therapy      Patient Name:  Indira Waters   MRN:  [id number]    [id number]:  Patient not seen today secondary to lower back pain and fatigue. Agreeable to client interview with living environment and PLOF as below.  Will follow-up later as time permits.    Living environment:  Lives with daughter in [facility] apt  1 flight of stairs to reach  B-handrails; no elevator access  Bathroom setup  Tub/shower   Shower stool  Standard toilet  Ambulates with SPC at all times  Daughter assists with ADL and iADL    1723:  Pt. Not seen today secondary to sleeping and dinner tray arriving.  Will follow-up tomorrow.     4/5/2025

## 2025-04-05 NOTE — ASSESSMENT & PLAN NOTE
-history of   -[facility] 05/2022 detailed in HPI  -continue home ASA  -hold home fenofibrate and repatha for now >> resume at DC  -EKG PRN concerns

## 2025-04-05 NOTE — ASSESSMENT & PLAN NOTE
-admitted due to community-acquired pneumonia, multilobular, in setting of known COPD, chronic airflow limitation, chronic respiratory failure on home oxygen, in former smoker  -at admission hypertensive otherwise hemoglobin stable and saturation stable on nasal cannula supplementation; T-max 102.3°   -ED workup detailed above however notable for WBC 32 and procalcitonin 1.34  -initiated on cefepime vancomycin in ED >>> good improvement overnight, change to ceftriaxone/azithromycin   -hold IV fluids at current D/T normal lactic and CHF history   -monitor for steroid need however wheezing minimal   -encourage PO intake as tolerated  -follow blood cultures   -sputum culture pending collection   -DuoNebs every 4 hours while awake   -incentive spirometer and flutter valve for pulmonary toileting - needs encouragement to use.  -PRN supportive care as indicated   -continue oxygen supplementation  -strict I&Os  -trend labs, address/replete electrolytes as indicated  -Added guaifenesin during the day, codeine at night with improvement.  Requests melatonin.  - PT/OT consult due to deconditioning in setting of COPD and acute illness  - Treat constipation  - Try a dose of Solu-medrol given wheezing

## 2025-04-05 NOTE — SUBJECTIVE & OBJECTIVE
"Interval History: She did not sleep well last night and is "out of it" this morning.  Coughing a lot but dry, less productive.  Had respiratory treatment this morning.  No BM in 2 days despite scheduled and prn laxatives.    Review of Systems   Constitutional:  Negative for chills and fever.   Respiratory:  Positive for cough and shortness of breath.    Cardiovascular:  Negative for chest pain and palpitations.   Gastrointestinal:  Positive for constipation.   Musculoskeletal:  Positive for arthralgias and back pain.     Objective:     Vital Signs (Most Recent):  Temp: 97.8 °F (36.6 °C) (04/05/25 0720)  Pulse: 76 (04/05/25 0747)  Resp: 17 (04/05/25 0845)  BP: (!) 141/63 (04/05/25 0720)  SpO2: 97 % (04/05/25 0747) Vital Signs (24h Range):  Temp:  [97.8 °F (36.6 °C)-98.5 °F (36.9 °C)] 97.8 °F (36.6 °C)  Pulse:  [76-91] 76  Resp:  [12-18] 17  SpO2:  [92 %-100 %] 97 %  BP: (133-181)/(60-77) 141/63     Weight: 98 kg (216 lb 0.8 oz)  Body mass index is 40.82 kg/m².    Intake/Output Summary (Last 24 hours) at 4/5/2025 0910  Last data filed at 4/5/2025 0625  Gross per 24 hour   Intake 240 ml   Output 2350 ml   Net -2110 ml         Physical Exam  Constitutional:       Appearance: She is ill-appearing.   Cardiovascular:      Rate and Rhythm: Normal rate and regular rhythm.      Heart sounds: Normal heart sounds. No murmur heard.     No gallop.   Pulmonary:      Effort: Pulmonary effort is normal.      Breath sounds: Rhonchi and rales present. No wheezing.   Abdominal:      General: Bowel sounds are normal.      Palpations: Abdomen is soft.   Skin:     General: Skin is warm and dry.   Neurological:      General: No focal deficit present.      Mental Status: She is alert.   Psychiatric:         Mood and Affect: Mood normal.         Behavior: Behavior normal.               Significant Labs: All pertinent labs within the past 24 hours have been reviewed.    Significant Imaging: I have reviewed all pertinent imaging " results/findings within the past 24 hours.

## 2025-04-05 NOTE — PROGRESS NOTES
[facility] [address] Medicine  Progress Note    Patient Name: Indira Waters  MRN: [id number]  Patient Class: IP- Inpatient   Admission Date: 3/31/2025  Length of Stay: 5 days  Attending Physician: Annamarie Scott MD  Primary Care Provider: Chun Zapata MD        Subjective     Principal Problem:CAP (community acquired pneumonia)        HPI:  HPi by Rina Blanc NP:  Ms. Waters is a 80 YOF with PMHx of HFrEF (EF 40-50% 06/2024), CAD ([facility] 05/2022 with severe 2 vessel, predominantly distal, disease), aortic atherosclerosis with tortuous aorta, HTN, HLD, DM type II, diabetic polyneuropathy,COPD, chronic respiratory insuffiencey/chronic airflow limitation on home oxygen qHS , former smoker, CKD III (baseline SCr 1.1-1.2), chronic anemia, history of CVA with residual left-sided deficits, GERD, fibromyalgia, chronic pain, osteoarthritis, anxiety/depression, and obesity.     She presents to ED via EMS with her daughter due to generally feeling unwell the last couple of days with fatigue, malaise, subjective fevers, chills, shortness of breath, productive cough, upper respiratory congestion, chest pain, tachycardia, and decreased oxygen saturation from baseline requiring increased nasal cannula supplementation. Ms. Waters also has complaints of dysuria. Daughter states that prior to ED arrival she had a hallucination of someone sitting on her bed that was not there, prompting her to activate EMS. Ms. Waters denies abdominal pain, N/V/D, constipation, hematuria, decreased UOP, changes in bowel habits or blood in stool, decreased appetite, changes in PO intake, light headiness, dizziness, or headaches.  She lives with her daughter, uses ambualtory aides as needed, and requires some assistance with ADLs.     In the ED she is hypertensive, heart rate 80-100s, saturation stable on 2-3 L nasal cannula, and T-max 102.3°.  CBC with WBC 32, H and H 10.4/33, platelets 376.  Chemistry NA 34, K  "4.4, chloride 101, CO2 21, BUN 21, SCr 1.4, glucose 328.  Phosphorus 4.2, magnesium 1.7.  LFTs unremarkable.  POCT lactic 1.70.  Procalcitonin 1.34.  UA noninfectious.  Blood cultures in process.  EKG with sinus tach and inferolateral ST and T-wave abnormality similar to priors.  CXR with  abnormal opacities left perihilar and right upper lobe opacities which can be seen with multilobar infection in this patient with provided history of sepsis.     The patient was admitted to the Hospital Medicine Service for further evaluation and management.     Overview/Hospital Course:  Patient admitted for treatment of sepsis due to multifocal pneumonia and associated severe hyperglycemia.  She had good improvement in her mental status, fever and shortness of breath and antibiotics were changed to ceftriaxone/azithromycin the following day.  She was on guaifenesin during the day for cough and a cough suppressant at night so she could rest.  PT/OT were consulted due to her poor conditioning and debility.    Interval History: She did not sleep well last night and is "out of it" this morning.  Coughing a lot but dry, less productive.  Had respiratory treatment this morning.  No BM in 2 days despite scheduled and prn laxatives.    Review of Systems   Constitutional:  Negative for chills and fever.   Respiratory:  Positive for cough and shortness of breath.    Cardiovascular:  Negative for chest pain and palpitations.   Gastrointestinal:  Positive for constipation.   Musculoskeletal:  Positive for arthralgias and back pain.     Objective:     Vital Signs (Most Recent):  Temp: 97.8 °F (36.6 °C) (04/05/25 0720)  Pulse: 76 (04/05/25 0747)  Resp: 17 (04/05/25 0845)  BP: (!) 141/63 (04/05/25 0720)  SpO2: 97 % (04/05/25 0747) Vital Signs (24h Range):  Temp:  [97.8 °F (36.6 °C)-98.5 °F (36.9 °C)] 97.8 °F (36.6 °C)  Pulse:  [76-91] 76  Resp:  [12-18] 17  SpO2:  [92 %-100 %] 97 %  BP: (133-181)/(60-77) 141/63     Weight: 98 kg (216 lb 0.8 " oz)  Body mass index is 40.82 kg/m².    Intake/Output Summary (Last 24 hours) at 4/5/2025 0910  Last data filed at 4/5/2025 0625  Gross per 24 hour   Intake 240 ml   Output 2350 ml   Net -2110 ml         Physical Exam  Constitutional:       Appearance: She is ill-appearing.   Cardiovascular:      Rate and Rhythm: Normal rate and regular rhythm.      Heart sounds: Normal heart sounds. No murmur heard.     No gallop.   Pulmonary:      Effort: Pulmonary effort is normal.      Breath sounds: Rhonchi and rales present. No wheezing.   Abdominal:      General: Bowel sounds are normal.      Palpations: Abdomen is soft.   Skin:     General: Skin is warm and dry.   Neurological:      General: No focal deficit present.      Mental Status: She is alert.   Psychiatric:         Mood and Affect: Mood normal.         Behavior: Behavior normal.               Significant Labs: All pertinent labs within the past 24 hours have been reviewed.    Significant Imaging: I have reviewed all pertinent imaging results/findings within the past 24 hours.      Assessment & Plan  CAP (community acquired pneumonia); multilobular  Acute on chronic respiratory failure with hypoxia  On home oxygen therapy  COPD (chronic obstructive pulmonary disease)  CAFL (chronic airflow limitation)  Chronic bronchitis  Former smoker  -admitted due to community-acquired pneumonia, multilobular, in setting of known COPD, chronic airflow limitation, chronic respiratory failure on home oxygen, in former smoker  -at admission hypertensive otherwise hemoglobin stable and saturation stable on nasal cannula supplementation; T-max 102.3°   -ED workup detailed above however notable for WBC 32 and procalcitonin 1.34  -initiated on cefepime vancomycin in ED >>> good improvement overnight, change to ceftriaxone/azithromycin   -hold IV fluids at current D/T normal lactic and CHF history   -monitor for steroid need however wheezing minimal   -encourage PO intake as  tolerated  -follow blood cultures   -sputum culture pending collection   -DuoNebs every 4 hours while awake   -incentive spirometer and flutter valve for pulmonary toileting - needs encouragement to use.  -PRN supportive care as indicated   -continue oxygen supplementation  -strict I&Os  -trend labs, address/replete electrolytes as indicated  -Added guaifenesin during the day, codeine at night with improvement.  Requests melatonin.  - PT/OT consult due to deconditioning in setting of COPD and acute illness  - Treat constipation  - Try a dose of Solu-medrol given wheezing    Chronic combined systolic and diastolic congestive heart failure  Essential hypertension  -chronic, BP elevated at admission, some LE edema on physical exam  -most recent TTE 06/2024 with EF 40-50% mild pulmonary HTN with PASP 45  -holding IVF resuscitation as detailed above  -continue home losartan, metoprolol, and nifedipine with hold parameters   -holding home torsemide for now >>> resume when clinically appropriate  -dose/medication adjustment as appropriate   -strict I&Os   -trend labs, address/replete electrolytes as indicated    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 pectoris  Athscl heart disease of native coronary artery w/o ang pctrs  -history of   -[facility] 05/2022 detailed in HPI  -continue home ASA  -hold home fenofibrate and repatha for now >> resume at DC  -EKG PRN concerns     History of CVA (cerebrovascular accident)  Hemiplegia and hemiparesis following cerebral infarction affecting left non-dominant side  -history of   -continue home ASA  -hold home fenofibrate and repatha for now >> resume at DC  -fall precautions     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 disease  -chronic, not well controlled   -hemoglobin A1c 7.9/180 3/06/2025   -hold home Lantus 26 units BID and lispro 15 units with breakfast, 15 units with lunch, N 11 units with dinner plus low-dose SSI   -begin dose reduced  basal and prandial insulin plus low-dose SSI  -dose/medication adjustment as appropriate   -monitor accuchecks AC/HS and PRN hypoglycemic protocol   -4/1 - change to home doses of basal insulin, increase prandial.  Persistently hyperglycemic, per daughter this is typical when she gets an infection.    Gastroesophageal reflux disease without esophagitis  -chronic  -PRN supportive care as indicated    Stage 3 chronic kidney disease  -chronic  -baseline SCr 1.1-1.2  -at admission SCr 1.4  -avoid nephrotoxins and hypotension as able, renally dose medications  -trend labs, address/replete electrolytes as indicated    Depression, unspecified  Generalized anxiety disorder  Recurrent major depressive disorder, in remission  -chronic  -continue home sertraline  -additional PRN supportive care as indicated     Chronic pain syndrome  Fibromyalgia  -chronic  -PRN supportive care for pain  -fall precautions      VTE Risk Mitigation (From admission, onward)           Ordered     enoxaparin injection 40 mg  Daily         03/31/25 1639     IP VTE HIGH RISK PATIENT  Once         03/31/25 1639     Place sequential compression device  Until discontinued         03/31/25 1639                    Discharge Planning   KYLE: 4/5/2025     Code Status: Full Code   Medical Readiness for Discharge Date:   Discharge Plan A: Home with family                Please place Justification for DME        Annamarie Zuniga MD  Department of Hospital Medicine   [facility] ([address])

## 2025-04-05 NOTE — ASSESSMENT & PLAN NOTE
-admitted due to community-acquired pneumonia, multilobular, in setting of known COPD, chronic airflow limitation, chronic respiratory failure on home oxygen, in former smoker  -at admission hypertensive otherwise hemoglobin stable and saturation stable on nasal cannula supplementation; T-max 102.3°   -ED workup detailed above however notable for WBC 32 and procalcitonin 1.34  -initiated on cefepime vancomycin in ED >>> good improvement overnight, change to ceftriaxone/azithromycin   -hold IV fluids at current D/T normal lactic and CHF history   -monitor for steroid need however wheezing minimal   -encourage PO intake as tolerated  -follow blood cultures   -sputum culture pending collection   -DuoNebs every 4 hours while awake   -incentive spirometer and flutter valve for pulmonary toileting - needs encouragement to use.  -PRN supportive care as indicated   -continue oxygen supplementation  -strict I&Os  -trend labs, address/replete electrolytes as indicated  -Added guaifenesin during the day, codeine at night with improvement.  Requests melatonin.  - PT/OT consult due to deconditioning in setting of COPD and acute illness  - Treat constipation  - Try a dose of Solu-medrol given wheezing     no

## 2025-04-06 LAB
ABSOLUTE EOSINOPHIL (OHS): 0.04 K/UL
ABSOLUTE MONOCYTE (OHS): 0.8 K/UL (ref 0.3–1)
ABSOLUTE NEUTROPHIL COUNT (OHS): 11.33 K/UL (ref 1.8–7.7)
ALBUMIN SERPL BCP-MCNC: 2.6 G/DL (ref 3.5–5.2)
ALP SERPL-CCNC: 76 UNIT/L (ref 40–150)
ALT SERPL W/O P-5'-P-CCNC: 12 UNIT/L (ref 10–44)
ANION GAP (OHS): 11 MMOL/L (ref 8–16)
AST SERPL-CCNC: 14 UNIT/L (ref 11–45)
BASOPHILS # BLD AUTO: 0.05 K/UL
BASOPHILS NFR BLD AUTO: 0.3 %
BILIRUB SERPL-MCNC: 0.1 MG/DL (ref 0.1–1)
BUN SERPL-MCNC: 27 MG/DL (ref 8–23)
CALCIUM SERPL-MCNC: 9.1 MG/DL (ref 8.7–10.5)
CHLORIDE SERPL-SCNC: 104 MMOL/L (ref 95–110)
CO2 SERPL-SCNC: 21 MMOL/L (ref 23–29)
CREAT SERPL-MCNC: 1.2 MG/DL (ref 0.5–1.4)
ERYTHROCYTE [DISTWIDTH] IN BLOOD BY AUTOMATED COUNT: 15.4 % (ref 11.5–14.5)
GFR SERPLBLD CREATININE-BSD FMLA CKD-EPI: 46 ML/MIN/1.73/M2
GLUCOSE SERPL-MCNC: 533 MG/DL (ref 70–110)
HCT VFR BLD AUTO: 26.3 % (ref 37–48.5)
HGB BLD-MCNC: 8.2 GM/DL (ref 12–16)
IMM GRANULOCYTES # BLD AUTO: 0.29 K/UL (ref 0–0.04)
IMM GRANULOCYTES NFR BLD AUTO: 2 % (ref 0–0.5)
LYMPHOCYTES # BLD AUTO: 1.9 K/UL (ref 1–4.8)
MCH RBC QN AUTO: 27.3 PG (ref 27–31)
MCHC RBC AUTO-ENTMCNC: 31.2 G/DL (ref 32–36)
MCV RBC AUTO: 88 FL (ref 82–98)
NUCLEATED RBC (/100WBC) (OHS): 0 /100 WBC
PLATELET # BLD AUTO: 357 K/UL (ref 150–450)
PMV BLD AUTO: 11.3 FL (ref 9.2–12.9)
POCT GLUCOSE: 145 MG/DL (ref 70–110)
POCT GLUCOSE: 448 MG/DL (ref 70–110)
POTASSIUM SERPL-SCNC: 5.3 MMOL/L (ref 3.5–5.1)
PROT SERPL-MCNC: 6.4 GM/DL (ref 6–8.4)
RBC # BLD AUTO: 3 M/UL (ref 4–5.4)
RELATIVE EOSINOPHIL (OHS): 0.3 %
RELATIVE LYMPHOCYTE (OHS): 13.2 % (ref 18–48)
RELATIVE MONOCYTE (OHS): 5.6 % (ref 4–15)
RELATIVE NEUTROPHIL (OHS): 78.6 % (ref 38–73)
SODIUM SERPL-SCNC: 136 MMOL/L (ref 136–145)
WBC # BLD AUTO: 14.41 K/UL (ref 3.9–12.7)

## 2025-04-06 PROCEDURE — 94640 AIRWAY INHALATION TREATMENT: CPT

## 2025-04-06 PROCEDURE — 63600175 PHARM REV CODE 636 W HCPCS: Performed by: HOSPITALIST

## 2025-04-06 PROCEDURE — 97166 OT EVAL MOD COMPLEX 45 MIN: CPT

## 2025-04-06 PROCEDURE — 27000646 HC AEROBIKA DEVICE

## 2025-04-06 PROCEDURE — 36415 COLL VENOUS BLD VENIPUNCTURE: CPT | Performed by: HOSPITALIST

## 2025-04-06 PROCEDURE — 63600175 PHARM REV CODE 636 W HCPCS: Performed by: NURSE PRACTITIONER

## 2025-04-06 PROCEDURE — 25000003 PHARM REV CODE 250: Performed by: NURSE PRACTITIONER

## 2025-04-06 PROCEDURE — 27000221 HC OXYGEN, UP TO 24 HOURS

## 2025-04-06 PROCEDURE — 99900035 HC TECH TIME PER 15 MIN (STAT)

## 2025-04-06 PROCEDURE — 94664 DEMO&/EVAL PT USE INHALER: CPT

## 2025-04-06 PROCEDURE — 80053 COMPREHEN METABOLIC PANEL: CPT | Performed by: HOSPITALIST

## 2025-04-06 PROCEDURE — 25000003 PHARM REV CODE 250: Performed by: HOSPITALIST

## 2025-04-06 PROCEDURE — 85025 COMPLETE CBC W/AUTO DIFF WBC: CPT | Performed by: HOSPITALIST

## 2025-04-06 PROCEDURE — 94799 UNLISTED PULMONARY SVC/PX: CPT

## 2025-04-06 PROCEDURE — 25000242 PHARM REV CODE 250 ALT 637 W/ HCPCS: Performed by: HOSPITALIST

## 2025-04-06 PROCEDURE — 11000001 HC ACUTE MED/SURG PRIVATE ROOM

## 2025-04-06 PROCEDURE — 94761 N-INVAS EAR/PLS OXIMETRY MLT: CPT

## 2025-04-06 PROCEDURE — 97535 SELF CARE MNGMENT TRAINING: CPT

## 2025-04-06 RX ORDER — INSULIN ASPART 100 [IU]/ML
0-10 INJECTION, SOLUTION INTRAVENOUS; SUBCUTANEOUS
Status: DISCONTINUED | OUTPATIENT
Start: 2025-04-06 | End: 2025-04-09 | Stop reason: HOSPADM

## 2025-04-06 RX ADMIN — SENNOSIDES AND DOCUSATE SODIUM 1 TABLET: 50; 8.6 TABLET ORAL at 08:04

## 2025-04-06 RX ADMIN — SERTRALINE HYDROCHLORIDE 100 MG: 100 TABLET ORAL at 08:04

## 2025-04-06 RX ADMIN — PREGABALIN 150 MG: 75 CAPSULE ORAL at 08:04

## 2025-04-06 RX ADMIN — HYDROCODONE BITARTRATE AND ACETAMINOPHEN 1 TABLET: 5; 325 TABLET ORAL at 05:04

## 2025-04-06 RX ADMIN — INSULIN ASPART 10 UNITS: 100 INJECTION, SOLUTION INTRAVENOUS; SUBCUTANEOUS at 05:04

## 2025-04-06 RX ADMIN — HYDROCODONE BITARTRATE AND ACETAMINOPHEN 1 TABLET: 5; 325 TABLET ORAL at 03:04

## 2025-04-06 RX ADMIN — INSULIN ASPART 10 UNITS: 100 INJECTION, SOLUTION INTRAVENOUS; SUBCUTANEOUS at 09:04

## 2025-04-06 RX ADMIN — METHOCARBAMOL 500 MG: 500 TABLET ORAL at 09:04

## 2025-04-06 RX ADMIN — LOSARTAN POTASSIUM 50 MG: 50 TABLET, FILM COATED ORAL at 08:04

## 2025-04-06 RX ADMIN — DICLOFENAC SODIUM 4 G: 10 GEL TOPICAL at 09:04

## 2025-04-06 RX ADMIN — MELATONIN TAB 3 MG 6 MG: 3 TAB at 08:04

## 2025-04-06 RX ADMIN — METOPROLOL SUCCINATE ER TABLETS 100 MG: 50 TABLET, FILM COATED, EXTENDED RELEASE ORAL at 08:04

## 2025-04-06 RX ADMIN — IPRATROPIUM BROMIDE AND ALBUTEROL SULFATE 3 ML: 2.5; .5 SOLUTION RESPIRATORY (INHALATION) at 08:04

## 2025-04-06 RX ADMIN — INSULIN ASPART 10 UNITS: 100 INJECTION, SOLUTION INTRAVENOUS; SUBCUTANEOUS at 12:04

## 2025-04-06 RX ADMIN — NIFEDIPINE 30 MG: 30 TABLET, FILM COATED, EXTENDED RELEASE ORAL at 08:04

## 2025-04-06 RX ADMIN — HYDROCODONE BITARTRATE AND ACETAMINOPHEN 1 TABLET: 5; 325 TABLET ORAL at 09:04

## 2025-04-06 RX ADMIN — INSULIN ASPART 8 UNITS: 100 INJECTION, SOLUTION INTRAVENOUS; SUBCUTANEOUS at 12:04

## 2025-04-06 RX ADMIN — ASPIRIN 81 MG: 81 TABLET, COATED ORAL at 08:04

## 2025-04-06 RX ADMIN — LACTULOSE 20 G: 20 SOLUTION ORAL at 08:04

## 2025-04-06 RX ADMIN — INSULIN HUMAN 5 UNITS: 100 INJECTION, SOLUTION PARENTERAL at 05:04

## 2025-04-06 RX ADMIN — INSULIN GLARGINE 26 UNITS: 100 INJECTION, SOLUTION SUBCUTANEOUS at 08:04

## 2025-04-06 RX ADMIN — CEFTRIAXONE 1 G: 1 INJECTION, POWDER, FOR SOLUTION INTRAMUSCULAR; INTRAVENOUS at 08:04

## 2025-04-06 RX ADMIN — HYDROCODONE BITARTRATE AND ACETAMINOPHEN 1 TABLET: 5; 325 TABLET ORAL at 08:04

## 2025-04-06 RX ADMIN — IPRATROPIUM BROMIDE AND ALBUTEROL SULFATE 3 ML: 2.5; .5 SOLUTION RESPIRATORY (INHALATION) at 03:04

## 2025-04-06 RX ADMIN — IPRATROPIUM BROMIDE AND ALBUTEROL SULFATE 3 ML: 2.5; .5 SOLUTION RESPIRATORY (INHALATION) at 07:04

## 2025-04-06 RX ADMIN — METHOCARBAMOL 500 MG: 500 TABLET ORAL at 08:04

## 2025-04-06 RX ADMIN — IPRATROPIUM BROMIDE AND ALBUTEROL SULFATE 3 ML: 2.5; .5 SOLUTION RESPIRATORY (INHALATION) at 11:04

## 2025-04-06 RX ADMIN — ENOXAPARIN SODIUM 40 MG: 40 INJECTION SUBCUTANEOUS at 05:04

## 2025-04-06 RX ADMIN — PROMETHAZINE HYDROCHLORIDE AND CODEINE PHOSPHATE 10 ML: 6.25; 1 SOLUTION ORAL at 08:04

## 2025-04-06 RX ADMIN — GUAIFENESIN 600 MG: 600 TABLET, EXTENDED RELEASE ORAL at 08:04

## 2025-04-06 RX ADMIN — ALLOPURINOL 300 MG: 300 TABLET ORAL at 08:04

## 2025-04-06 NOTE — PLAN OF CARE
Problem: Occupational Therapy  Goal: Occupational Therapy Goal  Description: Goals to be met by: 4/20/2025     Patient will increase functional independence with ADLs by performing:    UE Dressing with Stand-by Assistance.  LE Dressing with Contact Guard Assistance and Assistive Devices as needed.  Grooming while standing at sink with Stand-by Assistance.  Toileting from bedside commode with Supervision for hygiene and clothing management.   Toilet transfer to bedside commode with Supervision.    Outcome: Progressing     Initial OT eval/treat complete.  Has SPC and shower stool.  Needs RW and BSC currently though will continue to assess needs pending progress.  Recommend post acute Low Intensity therapy with continued 24/7 supervision/assist from daughter pending ability to negotiate steps; hs [address].  To benefit from continued acute care OT services to increase independence in self-care/functional transfers.  OT to follow.

## 2025-04-06 NOTE — ASSESSMENT & PLAN NOTE
-history of   -[facility] 05/2022 detailed in HPI  -continue home ASA  -hold home fenofibrate and repatha for now >> resume at DC  -EKG PRN concerns

## 2025-04-06 NOTE — PROGRESS NOTES
[facility] Surg [address] Medicine  Progress Note    Patient Name: Indira Waters  MRN: [id number]  Patient Class: IP- Inpatient   Admission Date: 3/31/2025  Length of Stay: 6 days  Attending Physician: Annamarie Scott MD  Primary Care Provider: Chun Zapata MD        Subjective     Principal Problem:CAP (community acquired pneumonia)        HPI:  HPi by Rina Blanc NP:  Ms. Waters is a 80 YOF with PMHx of HFrEF (EF 40-50% 06/2024), CAD ([facility] 05/2022 with severe 2 vessel, predominantly distal, disease), aortic atherosclerosis with tortuous aorta, HTN, HLD, DM type II, diabetic polyneuropathy,COPD, chronic respiratory insuffiencey/chronic airflow limitation on home oxygen qHS , former smoker, CKD III (baseline SCr 1.1-1.2), chronic anemia, history of CVA with residual left-sided deficits, GERD, fibromyalgia, chronic pain, osteoarthritis, anxiety/depression, and obesity.     She presents to ED via EMS with her daughter due to generally feeling unwell the last couple of days with fatigue, malaise, subjective fevers, chills, shortness of breath, productive cough, upper respiratory congestion, chest pain, tachycardia, and decreased oxygen saturation from baseline requiring increased nasal cannula supplementation. Ms. Waters also has complaints of dysuria. Daughter states that prior to ED arrival she had a hallucination of someone sitting on her bed that was not there, prompting her to activate EMS. Ms. Waters denies abdominal pain, N/V/D, constipation, hematuria, decreased UOP, changes in bowel habits or blood in stool, decreased appetite, changes in PO intake, light headiness, dizziness, or headaches.  She lives with her daughter, uses ambualtory aides as needed, and requires some assistance with ADLs.     In the ED she is hypertensive, heart rate 80-100s, saturation stable on 2-3 L nasal cannula, and T-max 102.3°.  CBC with WBC 32, H and H 10.4/33, platelets 376.  Chemistry NA 34, K  4.4, chloride 101, CO2 21, BUN 21, SCr 1.4, glucose 328.  Phosphorus 4.2, magnesium 1.7.  LFTs unremarkable.  POCT lactic 1.70.  Procalcitonin 1.34.  UA noninfectious.  Blood cultures in process.  EKG with sinus tach and inferolateral ST and T-wave abnormality similar to priors.  CXR with  abnormal opacities left perihilar and right upper lobe opacities which can be seen with multilobar infection in this patient with provided history of sepsis.     The patient was admitted to the Hospital Medicine Service for further evaluation and management.     Overview/Hospital Course:  Patient admitted for treatment of sepsis due to multifocal pneumonia and associated severe hyperglycemia.  She had good improvement in her mental status, fever and shortness of breath and antibiotics were changed to ceftriaxone/azithromycin the following day.  She was on guaifenesin during the day for cough and a cough suppressant at night so she could rest.  PT/OT were consulted due to her poor conditioning and debility, recommended home health.    Interval History: She has been working with therapy and is feeling better today, much more alert and sitting up in a chair.  Cough is also a little better.    Review of Systems   Constitutional:  Negative for chills and fever.   Respiratory:  Positive for cough and shortness of breath.    Cardiovascular:  Negative for chest pain and palpitations.   Musculoskeletal:  Positive for arthralgias and back pain.     Objective:     Vital Signs (Most Recent):  Temp: 97.5 °F (36.4 °C) (04/06/25 1212)  Pulse: 75 (04/06/25 1537)  Resp: 20 (04/06/25 1538)  BP: 137/63 (04/06/25 1212)  SpO2: 98 % (04/06/25 1212) Vital Signs (24h Range):  Temp:  [97.4 °F (36.3 °C)-98.2 °F (36.8 °C)] 97.5 °F (36.4 °C)  Pulse:  [72-98] 75  Resp:  [16-21] 20  SpO2:  [95 %-98 %] 98 %  BP: (122-175)/(56-75) 137/63     Weight: 98 kg (216 lb 0.8 oz)  Body mass index is 40.82 kg/m².    Intake/Output Summary (Last 24 hours) at 4/6/2025  1611  Last data filed at 4/6/2025 0928  Gross per 24 hour   Intake 480 ml   Output 1550 ml   Net -1070 ml         Physical Exam  Cardiovascular:      Rate and Rhythm: Normal rate and regular rhythm.      Heart sounds: Normal heart sounds. No murmur heard.     No gallop.   Pulmonary:      Effort: Pulmonary effort is normal.      Breath sounds: Rhonchi and rales present. No wheezing.      Comments: Left base.  Abdominal:      General: Bowel sounds are normal.      Palpations: Abdomen is soft.   Skin:     General: Skin is warm and dry.   Neurological:      General: No focal deficit present.      Mental Status: She is alert.   Psychiatric:         Mood and Affect: Mood normal.         Behavior: Behavior normal.               Significant Labs: All pertinent labs within the past 24 hours have been reviewed.    Significant Imaging: I have reviewed all pertinent imaging results/findings within the past 24 hours.      Assessment & Plan  CAP (community acquired pneumonia); multilobular  Acute on chronic respiratory failure with hypoxia  On home oxygen therapy  COPD (chronic obstructive pulmonary disease)  CAFL (chronic airflow limitation)  Chronic bronchitis  Former smoker  -admitted due to community-acquired pneumonia, multilobular, in setting of known COPD, chronic airflow limitation, chronic respiratory failure on home oxygen, in former smoker  -at admission hypertensive otherwise hemoglobin stable and saturation stable on nasal cannula supplementation; T-max 102.3°   -ED workup detailed above however notable for WBC 32 and procalcitonin 1.34  -initiated on cefepime vancomycin in ED >>> good improvement overnight, change to ceftriaxone/azithromycin   -hold IV fluids at current D/T normal lactic and CHF history   -monitor for steroid need however wheezing minimal   -encourage PO intake as tolerated  -follow blood cultures   -sputum culture pending collection   -DuoNebs every 4 hours while awake   -incentive spirometer and  flutter valve for pulmonary toileting - needs encouragement to use.  -PRN supportive care as indicated   -continue oxygen supplementation (on home oxygen)  -strict I&Os  -trend labs, address/replete electrolytes as indicated  -Added guaifenesin during the day, codeine at night with improvement.  Requests melatonin.  - PT/OT consult due to deconditioning in setting of COPD and acute illness - home health recommended  - Treat constipation  - Tried a dose of Solu-medrol given wheezing - seems to have been helpful    Chronic combined systolic and diastolic congestive heart failure  Essential hypertension  -chronic, BP elevated at admission, some LE edema on physical exam  -most recent TTE 06/2024 with EF 40-50% mild pulmonary HTN with PASP 45  -holding IVF resuscitation as detailed above  -continue home losartan, metoprolol, and nifedipine with hold parameters   -holding home torsemide for now >>> resume when clinically appropriate  -dose/medication adjustment as appropriate   -strict I&Os   -trend labs, address/replete electrolytes as indicated    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 pectoris  Athscl heart disease of native coronary artery w/o ang pctrs  -history of   -[facility] 05/2022 detailed in HPI  -continue home ASA  -hold home fenofibrate and repatha for now >> resume at DC  -EKG PRN concerns     History of CVA (cerebrovascular accident)  Hemiplegia and hemiparesis following cerebral infarction affecting left non-dominant side  -history of   -continue home ASA  -hold home fenofibrate and repatha for now >> resume at DC  -fall precautions     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 disease  -chronic, not well controlled   -hemoglobin A1c 7.9/180 3/06/2025   -hold home Lantus 26 units BID and lispro 15 units with breakfast, 15 units with lunch, N 11 units with dinner plus low-dose SSI   -begin dose reduced basal and prandial insulin plus low-dose SSI  -dose/medication  adjustment as appropriate   -monitor accuchecks AC/HS and PRN hypoglycemic protocol   -4/1 - change to home doses of basal insulin, increase prandial.  Persistently hyperglycemic, per daughter this is typical when she gets an infection.  -BG higher after Solu-Medrol dose    Gastroesophageal reflux disease without esophagitis  -chronic  -PRN supportive care as indicated    Stage 3 chronic kidney disease  -chronic  -baseline SCr 1.1-1.2  -at admission SCr 1.4  -avoid nephrotoxins and hypotension as able, renally dose medications  -trend labs, address/replete electrolytes as indicated    Depression, unspecified  Generalized anxiety disorder  Recurrent major depressive disorder, in remission  -chronic  -continue home sertraline  -additional PRN supportive care as indicated     Chronic pain syndrome  Fibromyalgia  -chronic  -PRN supportive care for pain  -fall precautions      VTE Risk Mitigation (From admission, onward)           Ordered     enoxaparin injection 40 mg  Daily         03/31/25 1639     IP VTE HIGH RISK PATIENT  Once         03/31/25 1639     Place sequential compression device  Until discontinued         03/31/25 1639                    Discharge Planning   KYLE: 4/5/2025     Code Status: Full Code   Medical Readiness for Discharge Date:   Discharge Plan A: Home with family                Please place Justification for DME        Annamarie Zuniga MD  Department of Hospital Medicine   [facility] - Med Surg ([address])

## 2025-04-06 NOTE — ASSESSMENT & PLAN NOTE
-admitted due to community-acquired pneumonia, multilobular, in setting of known COPD, chronic airflow limitation, chronic respiratory failure on home oxygen, in former smoker  -at admission hypertensive otherwise hemoglobin stable and saturation stable on nasal cannula supplementation; T-max 102.3°   -ED workup detailed above however notable for WBC 32 and procalcitonin 1.34  -initiated on cefepime vancomycin in ED >>> good improvement overnight, change to ceftriaxone/azithromycin   -hold IV fluids at current D/T normal lactic and CHF history   -monitor for steroid need however wheezing minimal   -encourage PO intake as tolerated  -follow blood cultures   -sputum culture pending collection   -DuoNebs every 4 hours while awake   -incentive spirometer and flutter valve for pulmonary toileting - needs encouragement to use.  -PRN supportive care as indicated   -continue oxygen supplementation (on home oxygen)  -strict I&Os  -trend labs, address/replete electrolytes as indicated  -Added guaifenesin during the day, codeine at night with improvement.  Requests melatonin.  - PT/OT consult due to deconditioning in setting of COPD and acute illness - home health recommended  - Treat constipation  - Tried a dose of Solu-medrol given wheezing - seems to have been helpful

## 2025-04-06 NOTE — PT/OT/SLP EVAL
Occupational Therapy   Evaluation and Treatment    Name: Indira Waters  MRN: [id number]  Admitting Diagnosis: CAP (community acquired pneumonia)  Recent Surgery: * No surgery found *      Recommendations:     Discharge Recommendations: Low Intensity Therapy (continued 24/7 supervision/assist from daughter)  Discharge Equipment Recommendations:  walker, rolling, bedside commode  Barriers to discharge:  Inaccessible home environment ([address]; current functional level)    Assessment:   Initial OT eval/treat complete.  Remained on O2 NC throughout session.  Step transfer bed>BSC with SPC and MIN A for increased steadying and cues for step direction; step transfer BSC>bed improved to CGA for steadying/safety with RW use though requiring cues for hand placement during transitions.  Toileting with MOD A for thorough back momo hygiene in stance though able to clean front momo region in stance with unilateral UE support and forward flexed trunk while reaching; cleaning back momo region in stance with RUE in stance with backward reaching and slight trunk rotation for task.  Setup for seated hand hygiene while EOB with retrieval of needed items.  Has SPC and shower stool.  Needs RW and BSC currently though will continue to assess needs pending progress.  Recommend post acute Low Intensity therapy with continued 24/7 supervision/assist from daughter pending ability to negotiate steps; has [address].  To benefit from continued acute care OT services to increase independence in self-care/functional transfers.  OT to follow.     Indira Waters is a 80 y.o. female with a medical diagnosis of CAP (community acquired pneumonia).  She presents with below deficits decreasing independence in self-care/functional transfers. Performance deficits affecting function: weakness, impaired endurance, impaired self care skills, impaired functional mobility, gait instability, impaired balance, decreased upper  extremity function, decreased lower extremity function, decreased safety awareness, pain, decreased ROM, impaired cardiopulmonary response to activity.      Rehab Prognosis: Good; patient would benefit from acute skilled OT services to address these deficits and reach maximum level of function.       Plan:     Patient to be seen 3 x/week to address the above listed problems via self-care/home management, therapeutic activities, therapeutic exercises  Plan of Care Expires: 04/20/25  Plan of Care Reviewed with: patient, daughter    Subjective     Chief Complaint: With c/o lower back pain.   Patient/Family Comments/goals: Pt. Expressed needing to void once seated at [facility].     Occupational Profile:  Lives with daughter in [facility] apt  1 flight of stairs to reach  B-handrails; no elevator access  Bathroom setup  Tub/shower   Shower stool  Standard toilet  Ambulates with SPC at all times  Daughter assists with ADL and iADL  Equipment Used at Home: cane, straight, shower chair  Assistance upon Discharge: Daughter able to assist.     Pain/Comfort:  Pain Rating 1: 9/10 (prior to activity)  Location - Side 1: Bilateral  Location - Orientation 1: lower  Location 1: back  Pain Addressed 1: Distraction, Cessation of Activity, Nurse notified, Pre-medicate for activity, Reposition  Pain Rating Post-Intervention 1: 6/10 (after activity)    Patients cultural, spiritual, Hinduism conflicts given the current situation:  (None stated.)    Objective:     Communicated with: Summer [place] RN prior to session.  Patient found HOB elevated with telemetry, peripheral IV, PureWick, oxygen upon OT entry to room.    General Precautions: Standard, fall  Orthopedic Precautions: N/A  Braces: N/A  Respiratory Status: Nasal cannula, flow 2 L/min    Occupational Performance:    Bed Mobility:    Supine<>sit supervision  HOB elevated coming into sit  Bed rails used   Bridging towards HOB SBA  Cues for overhead rail use  Good follow through of sequencing  cues    Functional Mobility/Transfers:  Sit>stand EOB with SPC MIN A   MIN cues for safe hand placement  Step transfer EOB>BSC SPC MIN A   Lift assist needed  Cues for safe hand placement  Increased steadying assist needed  Step transfer BSC>EOB RW CGA  For steadying/safety  Forward stooped posture noted  MIN cues for safe hand placement and maintaining close proximity to RW    Activities of Daily Living:  Toileting at [facility] MOD A   Able to clean front momo region in stance with CGA for steadying, LUE support at RW, and downward reaching with RUE for ADL  Able to clean back momo region in stance with assist for thoroughness though Pt. Able to partially clean with LUE support, slight R trunk rotation, and backward reach with RUE for ADL  Hand hygiene seated EOB setup assist  Retrieval of needed items     Cognitive/Visual Perceptual:  Cognitive/Psychosocial Skills:  -       Oriented to: Person, Place, Time, and Situation   -       Follows Commands/attention:Follows one-step commands  -       Communication: able to make basic needs known and answer questions appropriately with approx. 2-second delay in responses  -       Memory: No Deficits noted  -       Safety awareness/insight to disability: impaired   -       Mood/Affect/Coping skills/emotional control: Cooperative    Physical Exam:  Postural examination/scapula alignment: -       Rounded shoulders  -       Forward head  Upper Extremity Range of Motion:  -       Right Upper Extremity: WFL except for approx. 75% shoulder flex  -       Left Upper Extremity: WFL except for approx. 75% shoulder flex  Upper Extremity Strength: -       Right Upper Extremity: 3+/5 to 4-/5 gross   -       Left Upper Extremity: 3+/5 to 4-/5 gross    Strength: -       Right Upper Extremity: fair plus  -       Left Upper Extremity: fair plus  Fine Motor Coordination: -       Intact  Left hand thumb/finger opposition skills and Right hand thumb/finger opposition skills    [facility] 6 Click  ADL:  AMPAC Total Score: 17    Treatment & Education:  Educated on role of OT, POC, functional transfer/ADL safety, review of call light, and importance of calling for assist as needed.        Patient left HOB elevated with all lines intact, call button in reach, nursing notified, and daughter present    GOALS:   Multidisciplinary Problems       Occupational Therapy Goals          Problem: Occupational Therapy    Goal Priority Disciplines Outcome Interventions   Occupational Therapy Goal     OT, PT/OT Progressing    Description: Goals to be met by: 4/20/2025     Patient will increase functional independence with ADLs by performing:    UE Dressing with Stand-by Assistance.  LE Dressing with Contact Guard Assistance and Assistive Devices as needed.  Grooming while standing at sink with Stand-by Assistance.  Toileting from bedside commode with Supervision for hygiene and clothing management.   Toilet transfer to bedside commode with Supervision.                         DME Justifications:   Indira requires a commode for home use because she is confined to a single room.   Indira's mobility limitation cannot be sufficiently resolved by the use of a cane. Her functional mobility deficit can be sufficiently resolved with the use of a Rolling Walker. Patient's mobility limitation significantly impairs their ability to participate in one of more activities of daily living.  The use of a RW will significantly improve the patient's ability to participate in MRADLS and the patient will use it on regular basis in the home.    History:     Past Medical History:   Diagnosis Date    Arthritis     Back pain     Cancer     ovarian    Cervical cancer     Coronary artery disease     Depression     Diabetes mellitus     Fibromyalgia     Heart attack     History of MI (myocardial infarction)     Hyperlipidemia     Hypertension     Lupus     Stroke     slight left sided weakness         Past Surgical History:   Procedure Laterality Date     APPENDECTOMY       SECTION      2    CORONARY ANGIOGRAPHY N/A 2022    Procedure: ANGIOGRAM, CORONARY ARTERY;  Surgeon: Jose L Méndez MD;  Location: [facility] CATH LAB;  Service: Cardiology;  Laterality: N/A;    HYSTERECTOMY      with USO for cervical cancer    INJECTION OF ANESTHETIC AGENT AROUND NERVE Bilateral 2021    Procedure: BLOCK, NERVE, SYMPATHIC;  Surgeon: Holden Pereira MD;  Location: [facility] PAIN MGT;  Service: Pain Management;  Laterality: Bilateral;    INJECTION OF ANESTHETIC AGENT AROUND NERVE N/A 2021    Procedure: BLOCK, NERVE, SYMPATHETIC  need consent;  Surgeon: Hodlen Pereira MD;  Location: [facility] PAIN MGT;  Service: Pain Management;  Laterality: N/A;    YARED LINK,SWALLOW FUNCTION,CINE/VIDEO RECORD  2021         TONSILLECTOMY      TRIAL OF SPINAL CORD NERVE STIMULATOR N/A 3/8/2023    Procedure: LUMBAR SPINAL CORD STIMULATOR TRIAL NEVRO REP PATIENT STATES SHE NO LONGER TAKES PLAVIX;  Surgeon: Holden Pereira MD;  Location: [facility] PAIN MGT;  Service: Pain Management;  Laterality: N/A;       Time Tracking:     OT Date of Treatment: 25  OT Start Time: 1619  OT Stop Time: 1646  OT Total Time (min): 27 min    Billable Minutes:Evaluation 12  Self Care/Home Management 15    2025

## 2025-04-06 NOTE — ASSESSMENT & PLAN NOTE
-chronic, not well controlled   -hemoglobin A1c 7.9/180 3/06/2025   -hold home Lantus 26 units BID and lispro 15 units with breakfast, 15 units with lunch, N 11 units with dinner plus low-dose SSI   -begin dose reduced basal and prandial insulin plus low-dose SSI  -dose/medication adjustment as appropriate   -monitor accuchecks AC/HS and PRN hypoglycemic protocol   -4/1 - change to home doses of basal insulin, increase prandial.  Persistently hyperglycemic, per daughter this is typical when she gets an infection.  -BG higher after Solu-Medrol dose

## 2025-04-06 NOTE — SUBJECTIVE & OBJECTIVE
Interval History: She has been working with therapy and is feeling better today, much more alert and sitting up in a chair.  Cough is also a little better.    Review of Systems   Constitutional:  Negative for chills and fever.   Respiratory:  Positive for cough and shortness of breath.    Cardiovascular:  Negative for chest pain and palpitations.   Musculoskeletal:  Positive for arthralgias and back pain.     Objective:     Vital Signs (Most Recent):  Temp: 97.5 °F (36.4 °C) (04/06/25 1212)  Pulse: 75 (04/06/25 1537)  Resp: 20 (04/06/25 1538)  BP: 137/63 (04/06/25 1212)  SpO2: 98 % (04/06/25 1212) Vital Signs (24h Range):  Temp:  [97.4 °F (36.3 °C)-98.2 °F (36.8 °C)] 97.5 °F (36.4 °C)  Pulse:  [72-98] 75  Resp:  [16-21] 20  SpO2:  [95 %-98 %] 98 %  BP: (122-175)/(56-75) 137/63     Weight: 98 kg (216 lb 0.8 oz)  Body mass index is 40.82 kg/m².    Intake/Output Summary (Last 24 hours) at 4/6/2025 1611  Last data filed at 4/6/2025 0928  Gross per 24 hour   Intake 480 ml   Output 1550 ml   Net -1070 ml         Physical Exam  Cardiovascular:      Rate and Rhythm: Normal rate and regular rhythm.      Heart sounds: Normal heart sounds. No murmur heard.     No gallop.   Pulmonary:      Effort: Pulmonary effort is normal.      Breath sounds: Rhonchi and rales present. No wheezing.      Comments: Left base.  Abdominal:      General: Bowel sounds are normal.      Palpations: Abdomen is soft.   Skin:     General: Skin is warm and dry.   Neurological:      General: No focal deficit present.      Mental Status: She is alert.   Psychiatric:         Mood and Affect: Mood normal.         Behavior: Behavior normal.               Significant Labs: All pertinent labs within the past 24 hours have been reviewed.    Significant Imaging: I have reviewed all pertinent imaging results/findings within the past 24 hours.

## 2025-04-06 NOTE — ASSESSMENT & PLAN NOTE
-history of   -[facility] 05/2022 detailed in HPI  -continue home ASA  -hold home fenofibrate and repatha for now >> resume at DC  -EKG PRN concerns

## 2025-04-07 ENCOUNTER — PATIENT MESSAGE (OUTPATIENT)
Dept: ENDOCRINOLOGY | Facility: CLINIC | Age: 80
End: 2025-04-07
Payer: MEDICARE

## 2025-04-07 LAB
ABSOLUTE EOSINOPHIL (OHS): 0.82 K/UL
ABSOLUTE MONOCYTE (OHS): 0.79 K/UL (ref 0.3–1)
ABSOLUTE NEUTROPHIL COUNT (OHS): 9.26 K/UL (ref 1.8–7.7)
ALBUMIN SERPL BCP-MCNC: 2.7 G/DL (ref 3.5–5.2)
ALP SERPL-CCNC: 75 UNIT/L (ref 40–150)
ALT SERPL W/O P-5'-P-CCNC: 15 UNIT/L (ref 10–44)
ANION GAP (OHS): 11 MMOL/L (ref 8–16)
AST SERPL-CCNC: 14 UNIT/L (ref 11–45)
BASOPHILS # BLD AUTO: 0.13 K/UL
BASOPHILS NFR BLD AUTO: 0.9 %
BILIRUB SERPL-MCNC: 0.1 MG/DL (ref 0.1–1)
BUN SERPL-MCNC: 26 MG/DL (ref 8–23)
CALCIUM SERPL-MCNC: 9.5 MG/DL (ref 8.7–10.5)
CHLORIDE SERPL-SCNC: 108 MMOL/L (ref 95–110)
CO2 SERPL-SCNC: 21 MMOL/L (ref 23–29)
CREAT SERPL-MCNC: 1 MG/DL (ref 0.5–1.4)
ERYTHROCYTE [DISTWIDTH] IN BLOOD BY AUTOMATED COUNT: 15.8 % (ref 11.5–14.5)
GFR SERPLBLD CREATININE-BSD FMLA CKD-EPI: 57 ML/MIN/1.73/M2
GLUCOSE SERPL-MCNC: 173 MG/DL (ref 70–110)
HCT VFR BLD AUTO: 27 % (ref 37–48.5)
HGB BLD-MCNC: 8.5 GM/DL (ref 12–16)
IMM GRANULOCYTES # BLD AUTO: 0.37 K/UL (ref 0–0.04)
IMM GRANULOCYTES NFR BLD AUTO: 2.5 % (ref 0–0.5)
LYMPHOCYTES # BLD AUTO: 3.23 K/UL (ref 1–4.8)
MCH RBC QN AUTO: 27.5 PG (ref 27–31)
MCHC RBC AUTO-ENTMCNC: 31.5 G/DL (ref 32–36)
MCV RBC AUTO: 87 FL (ref 82–98)
NUCLEATED RBC (/100WBC) (OHS): 0 /100 WBC
PLATELET # BLD AUTO: 410 K/UL (ref 150–450)
PMV BLD AUTO: 10.7 FL (ref 9.2–12.9)
POCT GLUCOSE: 124 MG/DL (ref 70–110)
POCT GLUCOSE: 255 MG/DL (ref 70–110)
POCT GLUCOSE: 328 MG/DL (ref 70–110)
POCT GLUCOSE: 391 MG/DL (ref 70–110)
POCT GLUCOSE: 402 MG/DL (ref 70–110)
POTASSIUM SERPL-SCNC: 4.7 MMOL/L (ref 3.5–5.1)
PROT SERPL-MCNC: 6.6 GM/DL (ref 6–8.4)
RBC # BLD AUTO: 3.09 M/UL (ref 4–5.4)
RELATIVE EOSINOPHIL (OHS): 5.6 %
RELATIVE LYMPHOCYTE (OHS): 22.1 % (ref 18–48)
RELATIVE MONOCYTE (OHS): 5.4 % (ref 4–15)
RELATIVE NEUTROPHIL (OHS): 63.5 % (ref 38–73)
SODIUM SERPL-SCNC: 140 MMOL/L (ref 136–145)
WBC # BLD AUTO: 14.6 K/UL (ref 3.9–12.7)

## 2025-04-07 PROCEDURE — 63600175 PHARM REV CODE 636 W HCPCS: Performed by: NURSE PRACTITIONER

## 2025-04-07 PROCEDURE — 97530 THERAPEUTIC ACTIVITIES: CPT

## 2025-04-07 PROCEDURE — 25000003 PHARM REV CODE 250: Performed by: NURSE PRACTITIONER

## 2025-04-07 PROCEDURE — 63600175 PHARM REV CODE 636 W HCPCS: Performed by: HOSPITALIST

## 2025-04-07 PROCEDURE — 27000646 HC AEROBIKA DEVICE

## 2025-04-07 PROCEDURE — 85025 COMPLETE CBC W/AUTO DIFF WBC: CPT | Performed by: HOSPITALIST

## 2025-04-07 PROCEDURE — 94664 DEMO&/EVAL PT USE INHALER: CPT

## 2025-04-07 PROCEDURE — 97116 GAIT TRAINING THERAPY: CPT

## 2025-04-07 PROCEDURE — 36415 COLL VENOUS BLD VENIPUNCTURE: CPT | Performed by: HOSPITALIST

## 2025-04-07 PROCEDURE — 27000221 HC OXYGEN, UP TO 24 HOURS

## 2025-04-07 PROCEDURE — 82040 ASSAY OF SERUM ALBUMIN: CPT | Performed by: HOSPITALIST

## 2025-04-07 PROCEDURE — 11000001 HC ACUTE MED/SURG PRIVATE ROOM

## 2025-04-07 PROCEDURE — 99900035 HC TECH TIME PER 15 MIN (STAT)

## 2025-04-07 PROCEDURE — 94640 AIRWAY INHALATION TREATMENT: CPT

## 2025-04-07 PROCEDURE — 25000003 PHARM REV CODE 250: Performed by: HOSPITALIST

## 2025-04-07 PROCEDURE — 25000242 PHARM REV CODE 250 ALT 637 W/ HCPCS: Performed by: HOSPITALIST

## 2025-04-07 PROCEDURE — 94761 N-INVAS EAR/PLS OXIMETRY MLT: CPT

## 2025-04-07 RX ORDER — INSULIN GLARGINE 100 [IU]/ML
30 INJECTION, SOLUTION SUBCUTANEOUS 2 TIMES DAILY
Status: DISCONTINUED | OUTPATIENT
Start: 2025-04-07 | End: 2025-04-09

## 2025-04-07 RX ORDER — PREDNISONE 20 MG/1
40 TABLET ORAL DAILY
Status: DISCONTINUED | OUTPATIENT
Start: 2025-04-08 | End: 2025-04-09

## 2025-04-07 RX ORDER — CEFTRIAXONE 1 G/1
1 INJECTION, POWDER, FOR SOLUTION INTRAMUSCULAR; INTRAVENOUS ONCE
Status: COMPLETED | OUTPATIENT
Start: 2025-04-07 | End: 2025-04-07

## 2025-04-07 RX ORDER — INSULIN ASPART 100 [IU]/ML
15 INJECTION, SOLUTION INTRAVENOUS; SUBCUTANEOUS
Status: DISCONTINUED | OUTPATIENT
Start: 2025-04-07 | End: 2025-04-09 | Stop reason: HOSPADM

## 2025-04-07 RX ADMIN — IPRATROPIUM BROMIDE AND ALBUTEROL SULFATE 3 ML: 2.5; .5 SOLUTION RESPIRATORY (INHALATION) at 03:04

## 2025-04-07 RX ADMIN — CEFTRIAXONE 1 G: 1 INJECTION, POWDER, FOR SOLUTION INTRAMUSCULAR; INTRAVENOUS at 08:04

## 2025-04-07 RX ADMIN — HYDROCODONE BITARTRATE AND ACETAMINOPHEN 1 TABLET: 5; 325 TABLET ORAL at 08:04

## 2025-04-07 RX ADMIN — INSULIN ASPART 10 UNITS: 100 INJECTION, SOLUTION INTRAVENOUS; SUBCUTANEOUS at 04:04

## 2025-04-07 RX ADMIN — ASPIRIN 81 MG: 81 TABLET, COATED ORAL at 08:04

## 2025-04-07 RX ADMIN — INSULIN ASPART 15 UNITS: 100 INJECTION, SOLUTION INTRAVENOUS; SUBCUTANEOUS at 12:04

## 2025-04-07 RX ADMIN — HYDROCODONE BITARTRATE AND ACETAMINOPHEN 1 TABLET: 5; 325 TABLET ORAL at 04:04

## 2025-04-07 RX ADMIN — LOSARTAN POTASSIUM 50 MG: 50 TABLET, FILM COATED ORAL at 08:04

## 2025-04-07 RX ADMIN — SERTRALINE HYDROCHLORIDE 100 MG: 100 TABLET ORAL at 08:04

## 2025-04-07 RX ADMIN — METHOCARBAMOL 500 MG: 500 TABLET ORAL at 08:04

## 2025-04-07 RX ADMIN — INSULIN GLARGINE 30 UNITS: 100 INJECTION, SOLUTION SUBCUTANEOUS at 08:04

## 2025-04-07 RX ADMIN — DICLOFENAC SODIUM 4 G: 10 GEL TOPICAL at 08:04

## 2025-04-07 RX ADMIN — METOPROLOL SUCCINATE ER TABLETS 100 MG: 50 TABLET, FILM COATED, EXTENDED RELEASE ORAL at 08:04

## 2025-04-07 RX ADMIN — GUAIFENESIN 600 MG: 600 TABLET, EXTENDED RELEASE ORAL at 08:04

## 2025-04-07 RX ADMIN — ALLOPURINOL 300 MG: 300 TABLET ORAL at 08:04

## 2025-04-07 RX ADMIN — METHYLPREDNISOLONE SODIUM SUCCINATE 80 MG: 40 INJECTION, POWDER, FOR SOLUTION INTRAMUSCULAR; INTRAVENOUS at 08:04

## 2025-04-07 RX ADMIN — IPRATROPIUM BROMIDE AND ALBUTEROL SULFATE 3 ML: 2.5; .5 SOLUTION RESPIRATORY (INHALATION) at 11:04

## 2025-04-07 RX ADMIN — INSULIN ASPART 8 UNITS: 100 INJECTION, SOLUTION INTRAVENOUS; SUBCUTANEOUS at 12:04

## 2025-04-07 RX ADMIN — INSULIN ASPART 15 UNITS: 100 INJECTION, SOLUTION INTRAVENOUS; SUBCUTANEOUS at 04:04

## 2025-04-07 RX ADMIN — IPRATROPIUM BROMIDE AND ALBUTEROL SULFATE 3 ML: 2.5; .5 SOLUTION RESPIRATORY (INHALATION) at 08:04

## 2025-04-07 RX ADMIN — PROMETHAZINE HYDROCHLORIDE AND CODEINE PHOSPHATE 10 ML: 6.25; 1 SOLUTION ORAL at 08:04

## 2025-04-07 RX ADMIN — ENOXAPARIN SODIUM 40 MG: 40 INJECTION SUBCUTANEOUS at 04:04

## 2025-04-07 RX ADMIN — INSULIN ASPART 6 UNITS: 100 INJECTION, SOLUTION INTRAVENOUS; SUBCUTANEOUS at 08:04

## 2025-04-07 RX ADMIN — NIFEDIPINE 30 MG: 30 TABLET, FILM COATED, EXTENDED RELEASE ORAL at 08:04

## 2025-04-07 RX ADMIN — PREGABALIN 150 MG: 75 CAPSULE ORAL at 08:04

## 2025-04-07 RX ADMIN — METHOCARBAMOL 500 MG: 500 TABLET ORAL at 04:04

## 2025-04-07 RX ADMIN — IPRATROPIUM BROMIDE AND ALBUTEROL SULFATE 3 ML: 2.5; .5 SOLUTION RESPIRATORY (INHALATION) at 07:04

## 2025-04-07 RX ADMIN — SENNOSIDES AND DOCUSATE SODIUM 1 TABLET: 50; 8.6 TABLET ORAL at 08:04

## 2025-04-07 RX ADMIN — INSULIN ASPART 5 UNITS: 100 INJECTION, SOLUTION INTRAVENOUS; SUBCUTANEOUS at 08:04

## 2025-04-07 RX ADMIN — MELATONIN TAB 3 MG 6 MG: 3 TAB at 08:04

## 2025-04-07 RX ADMIN — HYDROCODONE BITARTRATE AND ACETAMINOPHEN 1 TABLET: 5; 325 TABLET ORAL at 12:04

## 2025-04-07 NOTE — ASSESSMENT & PLAN NOTE
-chronic, not well controlled   -hemoglobin A1c 7.9/180 3/06/2025   -hold home Lantus 26 units BID and lispro 15 units with breakfast, 15 units with lunch, N 11 units with dinner plus low-dose SSI   -begin dose reduced basal and prandial insulin plus low-dose SSI  -dose/medication adjustment as appropriate   -monitor accuchecks AC/HS and PRN hypoglycemic protocol   -4/1 - change to home doses of basal insulin, increase prandial.  Persistently hyperglycemic, per daughter this is typical when she gets an infection.  -BG higher after Solu-Medrol dose and now resuming steroids so will increase insulin again to make up for it.

## 2025-04-07 NOTE — ASSESSMENT & PLAN NOTE
-admitted due to community-acquired pneumonia, multilobular, in setting of known COPD, chronic airflow limitation, chronic respiratory failure on home oxygen, in former smoker  -at admission hypertensive otherwise hemoglobin stable and saturation stable on nasal cannula supplementation; T-max 102.3°   -ED workup detailed above however notable for WBC 32 and procalcitonin 1.34  -initiated on cefepime vancomycin in ED >>> good improvement overnight, change to ceftriaxone/azithromycin   -hold IV fluids at current D/T normal lactic and CHF history   -monitor for steroid need however wheezing minimal   -encourage PO intake as tolerated  -follow blood cultures   -sputum culture pending collection   -DuoNebs every 4 hours while awake   -incentive spirometer and flutter valve for pulmonary toileting - needs encouragement to use.  -PRN supportive care as indicated   -continue oxygen supplementation (on home oxygen)  -strict I&Os  -trend labs, address/replete electrolytes as indicated  -Added guaifenesin during the day, codeine at night with improvement.  Requests melatonin.  - PT/OT consult due to deconditioning in setting of COPD and acute illness - home health recommended  - Treat constipation  - Tried a dose of Solu-medrol given wheezing - seems to have been helpful but made her hyperglycemic.  -4/7 had  planned for discharge but she is wheezing more and feels ill - will give additional dose of Solu-medrol and start a course of oral prednisone.  Antibiotic course complete.

## 2025-04-07 NOTE — PLAN OF CARE
[facility] ([address])      HOME HEALTH ORDERS  FACE TO FACE ENCOUNTER    Patient Name: Indira Waters  YOB: 1945    PCP: Chun Zapata MD   PCP Address: [address] [place] [address] / [facility] [address]  PCP Phone Number: [phone]  PCP Fax: [phone]    Encounter Date: 3/31/25    Admit to Home Health    Diagnoses:  Active Hospital Problems    Diagnosis  POA    *CAP (community acquired pneumonia); multilobular [J18.9]  Yes    On home oxygen therapy [Z99.81]  Not Applicable    Acute on chronic respiratory failure with hypoxia [J96.21]  Yes    Chronic bronchitis [J42]  Yes    COPD (chronic obstructive pulmonary disease) [J44.9]  Yes    History of CVA (cerebrovascular accident) [Z86.73]  Not Applicable    Recurrent major depressive disorder, in remission [F33.40]  Yes    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 pectoris [I25.118]  Yes    Depression, unspecified [F32.A]  Yes    Chronic combined systolic and diastolic congestive heart failure [I50.42]  Yes    Hemiplegia and hemiparesis following cerebral infarction affecting left non-dominant side [I69.354]  Not Applicable    Athscl heart disease of native coronary artery w/o ang pctrs [I25.10]  Yes    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 disease [E11.22]  Yes    Stage 3 chronic kidney disease [N18.30]  Yes    Generalized anxiety disorder [F41.1]  Yes    Essential hypertension [I10]  Yes     Chronic    Former smoker [Z87.891]  Not Applicable     1964: Began smoking. 0.5 ppd.  2020: Quit smoking.      Chronic pain syndrome [G89.4]  Yes    CAFL (chronic airflow limitation) [J44.9]  Yes    Gastroesophageal reflux disease without esophagitis [K21.9]  Yes     Chronic    Fibromyalgia [M79.7]  Yes     Chronic      Resolved Hospital Problems   No resolved problems to display.       Follow Up Appointments:  Future Appointments   Date Time Provider Department Center   4/7/2025   "2:00 PM Giuliana Montero, BRAN [facility] ENDODIA [address]   4/29/2025  2:30 PM Elena Villatoro RD, CDCES [facility] DIBTMGM [facility]   7/1/2025  2:40 PM Elvira Hylton NP [facility] PAINMGT [facility]   7/29/2025  1:00 PM James Mendez OD [facility] OPTOMTY [facility]   9/26/2025  3:40 PM Jose L Méndez MD [facility] [id number] [facility]       Allergies:  Review of patient's allergies indicates:   Allergen Reactions    Bleach (sodium hypochlorite) Shortness Of Breath    Nitrofurantoin macrocrystalline Anaphylaxis    Pcn [penicillins] Shortness Of Breath    Lipitor [atorvastatin] Diarrhea and Rash    Nsaids (non-steroidal anti-inflammatory drug)      Tolerates aspirin      Statins-hmg-coa reductase inhibitors     Toradol [ketorolac]        Medications: Review discharge medications with patient and family and provide education.    Current Medications[1]     Medication List        CONTINUE taking these medications      acetaminophen 500 MG tablet  Commonly known as: TYLENOL  Take 2 tablets (1,000 mg total) by mouth every 8 (eight) hours as needed for Pain.     albuterol 1.25 mg/3 mL Nebu  Commonly known as: ACCUNEB  Take 3 mLs (1.25 mg total) by nebulization every 6 (six) hours as needed (for wheezing or shortness of breath). Rescue     allopurinoL 300 MG tablet  Commonly known as: ZYLOPRIM  Take 1 tablet (300 mg total) by mouth once daily.     aspirin 81 MG EC tablet  Commonly known as: ECOTRIN  Take 1 tablet (81 mg total) by mouth once daily.     BD ULTRA-FINE MICRO PEN NEEDLE 32 gauge x 1/4" Ndle  Generic drug: pen needle, diabetic  Use with insulin 5 times a day.     blood sugar diagnostic Strp  To check BG 6 times daily, to use with insurance preferred meter     CombiVENT RESPIMAT  mcg/actuation inhaler  Generic drug: ipratropium-albuteroL  Inhale 1 puff into the lungs every 6 (six) hours.     fenofibrate micronized 134 MG Cap  Commonly known as: LOFIBRA  Take 1 capsule (134 mg total) by mouth daily with breakfast.   "   HYDROcodone-acetaminophen  mg per tablet  Commonly known as: NORCO  Take 1 tablet by mouth every 8 (eight) hours as needed for Pain.     insulin lispro 100 unit/mL pen  Commonly known as: HumaLOG KwikPen Insulin  Inject 15 Units into the skin before breakfast AND 15 Units daily with lunch AND 11 Units daily with dinner or evening meal. Plus correction scale. Max TDD 50units..     lancets 30 gauge Misc  Commonly known as: TRUEPLUS LANCETS  Check blood sugar 4 times daily     LANTUS SOLOSTAR U-100 INSULIN 100 unit/mL (3 mL) Inpn pen  Generic drug: insulin glargine U-100 (Lantus)  Inject 26 Units into the skin 2 (two) times a day.     losartan 50 MG tablet  Commonly known as: COZAAR  Take 1 tablet (50 mg total) by mouth once daily.     melatonin 3 mg tablet  Commonly known as: MELATIN  Take 2 tablets (6 mg total) by mouth nightly as needed for Insomnia.     methocarbamoL 500 MG Tab  Commonly known as: Robaxin  Take 1 tablet (500 mg total) by mouth 3 (three) times daily as needed (muscle pain).     metoclopramide HCl 5 MG tablet  Commonly known as: REGLAN  TAKE ONE TABLET BY MOUTH FOUR TIMES DAILY AS NEEDED FOR nausea prevention     metoprolol succinate 100 MG 24 hr tablet  Commonly known as: TOPROL-XL  Take 1 tablet (100 mg total) by mouth once daily.     miconazole nitrate 2% 2 % Oint  Commonly known as: MICOTIN  Apply topically 2 (two) times daily. For 10 days     NIFEdipine 30 MG (OSM) 24 hr tablet  Commonly known as: PROCARDIA-XL  TAKE ONE TABLET BY MOUTH TWICE DAILY     nitroGLYCERIN 0.4 MG SL tablet  Commonly known as: NITROSTAT  DISSOLVE 1 TABLET UNDER THE TONGUE EVERY 5 MINUTES AS NEEDED FOR CHEST PAIN. DO NOT EXCEED A TOTAL OF 3 DOSES IN 15 MINUTES.     pregabalin 150 MG capsule  Commonly known as: LYRICA  Take 1 capsule (150 mg total) by mouth 2 (two) times daily.     REPATHA SURECLICK 140 mg/mL Pnij  Generic drug: evolocumab  Inject 1 mL (140 mg total) into the skin every 14 (fourteen) days.      sertraline 100 MG tablet  Commonly known as: ZOLOFT  TAKE ONE TABLET BY MOUTH EVERY DAY     STOOL SOFTENER-STIMULANT LAXAT 8.6-50 mg per tablet  Generic drug: senna-docusate  Take 1 tablet by mouth 2 (two) times daily as needed for Constipation.     torsemide 20 MG Tab  Commonly known as: DEMADEX  Take 1 tablet (20 mg total) by mouth once daily.                I have seen and examined this patient within the last 30 days. My clinical findings that support the need for the home health skilled services and home bound status are the following:no   Weakness/numbness causing balance and gait disturbance due to Weakness/Debility making it taxing to leave home.     Diet:   diabetic diet 1800 calorie    Referrals/ Consults  Physical Therapy to evaluate and treat. Evaluate for home safety and equipment needs; Establish/upgrade home exercise program. Perform / instruct on therapeutic exercises, gait training, transfer training, and Range of Motion.  Occupational Therapy to evaluate and treat. Evaluate home environment for safety and equipment needs. Perform/Instruct on transfers, ADL training, ROM, and therapeutic exercises.    Activities:   activity as tolerated    Nursing:   Agency to admit patient within 24 hours of hospital discharge unless specified on physician order or at patient request    SN to complete comprehensive assessment including routine vital signs. Instruct on disease process and s/s of complications to report to MD. Review/verify medication list sent home with the patient at time of discharge  and instruct patient/caregiver as needed. Frequency may be adjusted depending on start of care date.     Skilled nurse to perform up to 3 visits PRN for symptoms related to diagnosis    Notify MD if SBP > 160 or < 90; DBP > 90 or < 50; HR > 120 or < 50; Temp > 101; O2 < 88%    Ok to schedule additional visits based on staff availability and patient request on consecutive days within the home health episode.    When  multiple disciplines ordered:    Start of Care occurs on Sunday - Wednesday schedule remaining discipline evaluations as ordered on separate consecutive days following the start of care.    Thursday SOC -schedule subsequent evaluations Friday and Monday the following week.     Friday - Saturday SOC - schedule subsequent discipline evaluations on consecutive days starting Monday of the following week.    For all post-discharge communication and subsequent orders please contact patient's primary care physician,      I certify that this patient is confined to her home and needs physical therapy and occupational therapy.                [1]   Current Facility-Administered Medications   Medication Dose Route Frequency Provider Last Rate Last Admin    acetaminophen tablet 650 mg  650 mg Oral Q4H PRN Rina Blanc NP        albuterol-ipratropium 2.5 mg-0.5 mg/3 mL nebulizer solution 3 mL  3 mL Nebulization Q4H WAKE Annamarie Scott MD   3 mL at 04/06/25 1959    allopurinoL tablet 300 mg  300 mg Oral Daily Rina Blanc NP   300 mg at 04/06/25 0858    aspirin EC tablet 81 mg  81 mg Oral Daily Rina Blanc NP   81 mg at 04/06/25 0858    cefTRIAXone injection 1 g  1 g Intravenous Q24H Annamarie Scott MD   1 g at 04/06/25 0859    dextrose 50% injection 12.5 g  12.5 g Intravenous PRN Annamarie Scott MD        dextrose 50% injection 25 g  25 g Intravenous PRN Annamarie Scott MD        diclofenac sodium 1 % gel 4 g  4 g Topical (Top) Daily Annamarie Scott MD   4 g at 04/06/25 0925    diphenhydrAMINE injection 25 mg  25 mg Intravenous Q6H PRN Annamarie Scott MD   25 mg at 04/01/25 1035    enoxaparin injection 40 mg  40 mg Subcutaneous Daily Rina Blanc NP   40 mg at 04/06/25 1721    glucagon (human recombinant) injection 1 mg  1 mg Intramuscular PRN Annamarie Scott MD        glucose chewable tablet 16 g  16 g Oral PRN Annamarie Scott MD        glucose chewable tablet 24  g  24 g Oral PRN Annamarie Scott MD        guaiFENesin 12 hr tablet 600 mg  600 mg Oral BID Annamarie Scott MD   600 mg at 04/06/25 2048    HYDROcodone-acetaminophen 5-325 mg per tablet 1 tablet  1 tablet Oral Q4H PRN Rina Blanc, NP   1 tablet at 04/06/25 2052    insulin aspart U-100 pen 0-10 Units  0-10 Units Subcutaneous QID (AC + HS) PRN Annamarie Scott MD   8 Units at 04/06/25 1223    insulin aspart U-100 pen 10 Units  10 Units Subcutaneous TIDWM Annamarie Scott MD   10 Units at 04/06/25 1723    insulin glargine U-100 (Lantus) pen 26 Units  26 Units Subcutaneous BID Annamarie Scott MD   26 Units at 04/06/25 2048    losartan tablet 50 mg  50 mg Oral Daily Rina Blanc, NP   50 mg at 04/06/25 0858    melatonin tablet 6 mg  6 mg Oral Nightly Annamarie Scott MD   6 mg at 04/06/25 2047    methocarbamoL tablet 500 mg  500 mg Oral TID PRN Rina Blanc, NP   500 mg at 04/06/25 2052    metoprolol succinate (TOPROL-XL) 24 hr tablet 100 mg  100 mg Oral Daily Rina Blanc, NP   100 mg at 04/06/25 0858    NIFEdipine 24 hr tablet 30 mg  30 mg Oral BID Rina Blanc, NP   30 mg at 04/06/25 2048    ondansetron disintegrating tablet 8 mg  8 mg Oral Q8H PRN Rina Blanc, NP        ondansetron injection 8 mg  8 mg Intravenous Q6H PRN Rina Blanc, NP   8 mg at 04/04/25 1359    pregabalin capsule 150 mg  150 mg Oral BID Rina Blanc, NP   150 mg at 04/06/25 2048    promethazine-codeine 6.25-10 mg/5 ml syrup 10 mL  10 mL Oral QHS Annamarie Scott MD   10 mL at 04/06/25 2057    senna-docusate 8.6-50 mg per tablet 1 tablet  1 tablet Oral BID Rina Blanc, NP   1 tablet at 04/06/25 2048    sertraline tablet 100 mg  100 mg Oral Daily Rina Blanc, NP   100 mg at 04/06/25 0858    sodium chloride 0.9% flush 10 mL  10 mL Intravenous PRN Rina Blanc, NP

## 2025-04-07 NOTE — ASSESSMENT & PLAN NOTE
-history of   -[facility] 05/2022 detailed in HPI  -continue home ASA  -hold home fenofibrate and repatha for now >> resume at DC  -EKG PRN concerns

## 2025-04-07 NOTE — SUBJECTIVE & OBJECTIVE
Interval History: We had planned on her going home but she feels too ill to be discharged today.  She feels better with steroids but not given yesterday due to hyperglycemia and infection.    Review of Systems   Constitutional:  Negative for chills and fever.   Respiratory:  Positive for cough, shortness of breath and wheezing.    Cardiovascular:  Negative for chest pain and palpitations.   Musculoskeletal:  Positive for arthralgias and back pain.     Objective:     Vital Signs (Most Recent):  Temp: 98.4 °F (36.9 °C) (04/07/25 0747)  Pulse: 74 (04/07/25 0747)  Resp: 18 (04/07/25 0747)  BP: (!) 148/66 (04/07/25 0747)  SpO2: 99 % (04/07/25 0747) Vital Signs (24h Range):  Temp:  [97.5 °F (36.4 °C)-98.4 °F (36.9 °C)] 98.4 °F (36.9 °C)  Pulse:  [72-94] 74  Resp:  [16-20] 18  SpO2:  [95 %-99 %] 99 %  BP: (136-148)/(60-82) 148/66     Weight: 98 kg (216 lb 0.8 oz)  Body mass index is 40.82 kg/m².    Intake/Output Summary (Last 24 hours) at 4/7/2025 0755  Last data filed at 4/7/2025 0003  Gross per 24 hour   Intake 320 ml   Output 2200 ml   Net -1880 ml         Physical Exam  Constitutional:       Comments: Lethargic   Cardiovascular:      Rate and Rhythm: Normal rate and regular rhythm.      Heart sounds: Normal heart sounds. No murmur heard.     No gallop.   Pulmonary:      Effort: Pulmonary effort is normal.      Breath sounds: Wheezing and rhonchi present.   Abdominal:      General: Bowel sounds are normal.      Palpations: Abdomen is soft.   Skin:     General: Skin is warm and dry.   Neurological:      General: No focal deficit present.   Psychiatric:         Mood and Affect: Mood normal.         Behavior: Behavior normal.               Significant Labs: All pertinent labs within the past 24 hours have been reviewed.    Significant Imaging: I have reviewed all pertinent imaging results/findings within the past 24 hours.

## 2025-04-07 NOTE — PROGRESS NOTES
[facility] Surg [address] Medicine  Progress Note    Patient Name: Indira Waters  MRN: [id number]  Patient Class: IP- Inpatient   Admission Date: 3/31/2025  Length of Stay: 7 days  Attending Physician: Annamarie Scott MD  Primary Care Provider: Chun Zapata MD        Subjective     Principal Problem:CAP (community acquired pneumonia)        HPI:  HPi by Rina Blanc NP:  Ms. Waters is a 80 YOF with PMHx of HFrEF (EF 40-50% 06/2024), CAD ([facility] 05/2022 with severe 2 vessel, predominantly distal, disease), aortic atherosclerosis with tortuous aorta, HTN, HLD, DM type II, diabetic polyneuropathy,COPD, chronic respiratory insuffiencey/chronic airflow limitation on home oxygen qHS , former smoker, CKD III (baseline SCr 1.1-1.2), chronic anemia, history of CVA with residual left-sided deficits, GERD, fibromyalgia, chronic pain, osteoarthritis, anxiety/depression, and obesity.     She presents to ED via EMS with her daughter due to generally feeling unwell the last couple of days with fatigue, malaise, subjective fevers, chills, shortness of breath, productive cough, upper respiratory congestion, chest pain, tachycardia, and decreased oxygen saturation from baseline requiring increased nasal cannula supplementation. Ms. Waters also has complaints of dysuria. Daughter states that prior to ED arrival she had a hallucination of someone sitting on her bed that was not there, prompting her to activate EMS. Ms. Waters denies abdominal pain, N/V/D, constipation, hematuria, decreased UOP, changes in bowel habits or blood in stool, decreased appetite, changes in PO intake, light headiness, dizziness, or headaches.  She lives with her daughter, uses ambualtory aides as needed, and requires some assistance with ADLs.     In the ED she is hypertensive, heart rate 80-100s, saturation stable on 2-3 L nasal cannula, and T-max 102.3°.  CBC with WBC 32, H and H 10.4/33, platelets 376.  Chemistry NA 34, K  4.4, chloride 101, CO2 21, BUN 21, SCr 1.4, glucose 328.  Phosphorus 4.2, magnesium 1.7.  LFTs unremarkable.  POCT lactic 1.70.  Procalcitonin 1.34.  UA noninfectious.  Blood cultures in process.  EKG with sinus tach and inferolateral ST and T-wave abnormality similar to priors.  CXR with  abnormal opacities left perihilar and right upper lobe opacities which can be seen with multilobar infection in this patient with provided history of sepsis.     The patient was admitted to the Hospital Medicine Service for further evaluation and management.     Overview/Hospital Course:  Patient admitted for treatment of sepsis due to multifocal pneumonia and associated severe hyperglycemia.  She had good improvement in her mental status, fever and shortness of breath and antibiotics were changed to ceftriaxone/azithromycin the following day.  She was on guaifenesin during the day for cough and a cough suppressant at night so she could rest.  PT/OT were consulted due to her poor conditioning and debility, recommended home health.    Interval History: We had planned on her going home but she feels too ill to be discharged today.  She feels better with steroids but not given yesterday due to hyperglycemia and infection.    Review of Systems   Constitutional:  Negative for chills and fever.   Respiratory:  Positive for cough, shortness of breath and wheezing.    Cardiovascular:  Negative for chest pain and palpitations.   Musculoskeletal:  Positive for arthralgias and back pain.     Objective:     Vital Signs (Most Recent):  Temp: 98.4 °F (36.9 °C) (04/07/25 0747)  Pulse: 74 (04/07/25 0747)  Resp: 18 (04/07/25 0747)  BP: (!) 148/66 (04/07/25 0747)  SpO2: 99 % (04/07/25 0747) Vital Signs (24h Range):  Temp:  [97.5 °F (36.4 °C)-98.4 °F (36.9 °C)] 98.4 °F (36.9 °C)  Pulse:  [72-94] 74  Resp:  [16-20] 18  SpO2:  [95 %-99 %] 99 %  BP: (136-148)/(60-82) 148/66     Weight: 98 kg (216 lb 0.8 oz)  Body mass index is 40.82  kg/m².    Intake/Output Summary (Last 24 hours) at 4/7/2025 0755  Last data filed at 4/7/2025 0003  Gross per 24 hour   Intake 320 ml   Output 2200 ml   Net -1880 ml         Physical Exam  Constitutional:       Comments: Lethargic   Cardiovascular:      Rate and Rhythm: Normal rate and regular rhythm.      Heart sounds: Normal heart sounds. No murmur heard.     No gallop.   Pulmonary:      Effort: Pulmonary effort is normal.      Breath sounds: Wheezing and rhonchi present.   Abdominal:      General: Bowel sounds are normal.      Palpations: Abdomen is soft.   Skin:     General: Skin is warm and dry.   Neurological:      General: No focal deficit present.   Psychiatric:         Mood and Affect: Mood normal.         Behavior: Behavior normal.               Significant Labs: All pertinent labs within the past 24 hours have been reviewed.    Significant Imaging: I have reviewed all pertinent imaging results/findings within the past 24 hours.      Assessment & Plan  CAP (community acquired pneumonia); multilobular  Acute on chronic respiratory failure with hypoxia  On home oxygen therapy  COPD (chronic obstructive pulmonary disease)  CAFL (chronic airflow limitation)  Chronic bronchitis  Former smoker  -admitted due to community-acquired pneumonia, multilobular, in setting of known COPD, chronic airflow limitation, chronic respiratory failure on home oxygen, in former smoker  -at admission hypertensive otherwise hemoglobin stable and saturation stable on nasal cannula supplementation; T-max 102.3°   -ED workup detailed above however notable for WBC 32 and procalcitonin 1.34  -initiated on cefepime vancomycin in ED >>> good improvement overnight, change to ceftriaxone/azithromycin   -hold IV fluids at current D/T normal lactic and CHF history   -monitor for steroid need however wheezing minimal   -encourage PO intake as tolerated  -follow blood cultures   -sputum culture pending collection   -DuoNebs every 4 hours while  awake   -incentive spirometer and flutter valve for pulmonary toileting - needs encouragement to use.  -PRN supportive care as indicated   -continue oxygen supplementation (on home oxygen)  -strict I&Os  -trend labs, address/replete electrolytes as indicated  -Added guaifenesin during the day, codeine at night with improvement.  Requests melatonin.  - PT/OT consult due to deconditioning in setting of COPD and acute illness - home health recommended  - Treat constipation  - Tried a dose of Solu-medrol given wheezing - seems to have been helpful but made her hyperglycemic.  -4/7 had  planned for discharge but she is wheezing more and feels ill - will give additional dose of Solu-medrol and start a course of oral prednisone.  Antibiotic course complete.    Chronic combined systolic and diastolic congestive heart failure  Essential hypertension  -chronic, BP elevated at admission, some LE edema on physical exam  -most recent TTE 06/2024 with EF 40-50% mild pulmonary HTN with PASP 45  -holding IVF resuscitation as detailed above  -continue home losartan, metoprolol, and nifedipine with hold parameters   -holding home torsemide for now >>> resume when clinically appropriate  -dose/medication adjustment as appropriate   -strict I&Os   -trend labs, address/replete electrolytes as indicated    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 pectoris  Athscl heart disease of native coronary artery w/o ang pctrs  -history of   -[facility] 05/2022 detailed in HPI  -continue home ASA  -hold home fenofibrate and repatha for now >> resume at DC  -EKG PRN concerns     History of CVA (cerebrovascular accident)  Hemiplegia and hemiparesis following cerebral infarction affecting left non-dominant side  -history of   -continue home ASA  -hold home fenofibrate and repatha for now >> resume at DC  -fall precautions     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 disease  -chronic, not well controlled    -hemoglobin A1c 7.9/180 3/06/2025   -hold home Lantus 26 units BID and lispro 15 units with breakfast, 15 units with lunch, N 11 units with dinner plus low-dose SSI   -begin dose reduced basal and prandial insulin plus low-dose SSI  -dose/medication adjustment as appropriate   -monitor accuchecks AC/HS and PRN hypoglycemic protocol   -4/1 - change to home doses of basal insulin, increase prandial.  Persistently hyperglycemic, per daughter this is typical when she gets an infection.  -BG higher after Solu-Medrol dose and now resuming steroids so will increase insulin again to make up for it.    Gastroesophageal reflux disease without esophagitis  -chronic  -PRN supportive care as indicated    Stage 3 chronic kidney disease  -chronic  -baseline SCr 1.1-1.2  -at admission SCr 1.4  -avoid nephrotoxins and hypotension as able, renally dose medications  -trend labs, address/replete electrolytes as indicated    Depression, unspecified  Generalized anxiety disorder  Recurrent major depressive disorder, in remission  -chronic  -continue home sertraline  -additional PRN supportive care as indicated     Chronic pain syndrome  Fibromyalgia  -chronic  -PRN supportive care for pain  -fall precautions      VTE Risk Mitigation (From admission, onward)           Ordered     enoxaparin injection 40 mg  Daily         03/31/25 1639     IP VTE HIGH RISK PATIENT  Once         03/31/25 1639     Place sequential compression device  Until discontinued         03/31/25 1639                    Discharge Planning   KYLE: 4/5/2025     Code Status: Full Code   Medical Readiness for Discharge Date:   Discharge Plan A: Home with family                Please place Justification for DME        Annamarie Zuniga MD  Department of Hospital Medicine   [facility] ([address])

## 2025-04-07 NOTE — ASSESSMENT & PLAN NOTE
-history of   -[facility] 05/2022 detailed in HPI  -continue home ASA  -hold home fenofibrate and repatha for now >> resume at DC  -EKG PRN concerns

## 2025-04-07 NOTE — PLAN OF CARE
04/07/25 1639   Discharge Reassessment   Assessment Type Discharge Planning Reassessment   Did the patient's condition or plan change since previous assessment? No   Discharge Plan discussed with: Patient;Adult children   Communicated KYLE with patient/caregiver Date not available/Unable to determine   Discharge Plan A Home with family;Home Health   Discharge Plan B Home with family   DME Needed Upon Discharge  none   Transition of Care Barriers None   Why the patient remains in the hospital Requires continued medical care   Post-Acute Status   Discharge Delays None known at this time     Patient still being medically treated for pneumonia.

## 2025-04-07 NOTE — PT/OT/SLP PROGRESS
Physical Therapy Treatment    Patient Name:  Indira Waters   MRN:  [id number]    Recommendations:     Discharge Recommendations: Low Intensity Therapy  Discharge Equipment Recommendations: none  Barriers to discharge: Inaccessible home    Assessment:     Indira Waters is a 80 y.o. female admitted with a medical diagnosis of CAP (community acquired pneumonia).  She presents with the following impairments/functional limitations: weakness, impaired endurance, impaired self care skills, impaired functional mobility, gait instability, impaired balance, decreased lower extremity function, decreased upper extremity function, decreased coordination, decreased safety awareness, pain, decreased ROM, edema, impaired skin.    Patient with progress towards goals, able to complete transfers and short ambulation bout on O2 with RW and CGA-SBA. Initiated pre gait/stair training but notable decreased endurance. Discussed importance of stair practice tomorrow, pt and dtr in agreement with plan for trip to rehab gym. Rec for dc to LIT.    Rehab Prognosis: Fair; patient would benefit from acute skilled PT services to address these deficits and reach maximum level of function.    Recent Surgery: * No surgery found *      Plan:     During this hospitalization, patient to be seen 5 x/week to address the identified rehab impairments via gait training, therapeutic activities, therapeutic exercises, neuromuscular re-education and progress toward the following goals:    Plan of Care Expires:  04/18/25    Subjective     Chief Complaint: Back pain, coughing, fatigued with ambulation  Patient/Family Comments/goals: Comments her dtr is her rickie garza; Patient agreeable to PT treatment.  Pain/Comfort:  Pain Rating 1:  (unrated chronic back pain worsened with mobility)  Pain Rating Post-Intervention 1:  (appears comfortable at rest)      Objective:     Communicated with RN prior to session.  Patient found HOB elevated with peripheral  IV, PureWick, oxygen upon PT entry to room.     General Precautions: Standard, fall  Orthopedic Precautions: N/A  Braces: N/A  Respiratory Status: Nasal cannula, flow 2 L/min     Patient donned non slip slippers for OOB mobility.    Functional Mobility:  Bed Mobility:     Bridging: modified independence  Supine to Sit: contact guard assistance  Sit to Supine: stand by assistance  Transfers:     Sit to Stand:  stand by assistance and contact guard assistance with rolling walker  Toilet Transfer: contact guard assistance with  rolling walker  using  Step Transfer  Gait: x25 ft with RW and CGA progressing to SBA. Slow gait with slight forward flexion of trunk and increased B stance time. No overt LOB, no explicit SOB until end of gait bout. Further gait limited by fatigue.      AM-PAC 6 CLICK MOBILITY  Turning over in bed (including adjusting bedclothes, sheets and blankets)?: 4  Sitting down on and standing up from a chair with arms (e.g., wheelchair, bedside commode, etc.): 3  Moving from lying on back to sitting on the side of the bed?: 3  Moving to and from a bed to a chair (including a wheelchair)?: 3  Need to walk in hospital room?: 3  Climbing 3-5 steps with a railing?: 3  Basic Mobility Total Score: 19       Treatment & Education:  Standing balance 2x60 sec with BUE support (1 bout with momo hygiene, 1 bout with 30 sec marching for pre stair training and 30 sec static standing), too fatigued to continued standing longer  Transfers and gait as above, SpO2 monitored and remained >90% throughout session    Patient left HOB elevated with all lines intact, call button in reach, and dtr present..    GOALS:   Multidisciplinary Problems       Physical Therapy Goals          Problem: Physical Therapy    Goal Priority Disciplines Outcome Interventions   Physical Therapy Goal     PT, PT/OT Progressing    Description: P.T goals to be met in 2 weeks as follows:  1. Mod I Bed Mobility  2. Mod I T/F w LRAD  3. Gait 150' Mod I  with LRAD  4. Pt will ascend/descend 4 steps with Min A                       DME Justifications:  No DME recommended requiring DME justifications    Time Tracking:     PT Received On: 04/07/25  PT Start Time: 1425     PT Stop Time: 1502  PT Total Time (min): 37 min     Billable Minutes: Gait Training 17 and Therapeutic Activity 20    Treatment Type: Treatment  PT/PTA: PT     Number of PTA visits since last PT visit: 0     04/07/2025

## 2025-04-08 LAB
ABSOLUTE EOSINOPHIL (OHS): 0.11 K/UL
ABSOLUTE MONOCYTE (OHS): 1 K/UL (ref 0.3–1)
ABSOLUTE NEUTROPHIL COUNT (OHS): 8.96 K/UL (ref 1.8–7.7)
ALBUMIN SERPL BCP-MCNC: 2.7 G/DL (ref 3.5–5.2)
ALP SERPL-CCNC: 75 UNIT/L (ref 40–150)
ALT SERPL W/O P-5'-P-CCNC: 17 UNIT/L (ref 10–44)
ANION GAP (OHS): 9 MMOL/L (ref 8–16)
AST SERPL-CCNC: 13 UNIT/L (ref 11–45)
BASOPHILS # BLD AUTO: 0.05 K/UL
BASOPHILS NFR BLD AUTO: 0.4 %
BILIRUB SERPL-MCNC: 0.1 MG/DL (ref 0.1–1)
BUN SERPL-MCNC: 32 MG/DL (ref 8–23)
CALCIUM SERPL-MCNC: 9.3 MG/DL (ref 8.7–10.5)
CHLORIDE SERPL-SCNC: 106 MMOL/L (ref 95–110)
CO2 SERPL-SCNC: 23 MMOL/L (ref 23–29)
CREAT SERPL-MCNC: 1 MG/DL (ref 0.5–1.4)
ERYTHROCYTE [DISTWIDTH] IN BLOOD BY AUTOMATED COUNT: 15.6 % (ref 11.5–14.5)
GFR SERPLBLD CREATININE-BSD FMLA CKD-EPI: 57 ML/MIN/1.73/M2
GLUCOSE SERPL-MCNC: 269 MG/DL (ref 70–110)
HCT VFR BLD AUTO: 26.5 % (ref 37–48.5)
HGB BLD-MCNC: 8.3 GM/DL (ref 12–16)
IMM GRANULOCYTES # BLD AUTO: 0.35 K/UL (ref 0–0.04)
IMM GRANULOCYTES NFR BLD AUTO: 2.6 % (ref 0–0.5)
LYMPHOCYTES # BLD AUTO: 2.81 K/UL (ref 1–4.8)
MCH RBC QN AUTO: 27.4 PG (ref 27–31)
MCHC RBC AUTO-ENTMCNC: 31.3 G/DL (ref 32–36)
MCV RBC AUTO: 88 FL (ref 82–98)
NUCLEATED RBC (/100WBC) (OHS): 0 /100 WBC
PLATELET # BLD AUTO: 362 K/UL (ref 150–450)
PMV BLD AUTO: 10.7 FL (ref 9.2–12.9)
POCT GLUCOSE: 193 MG/DL (ref 70–110)
POCT GLUCOSE: 241 MG/DL (ref 70–110)
POCT GLUCOSE: 283 MG/DL (ref 70–110)
POCT GLUCOSE: 297 MG/DL (ref 70–110)
POTASSIUM SERPL-SCNC: 4.9 MMOL/L (ref 3.5–5.1)
PROT SERPL-MCNC: 6.5 GM/DL (ref 6–8.4)
RBC # BLD AUTO: 3.03 M/UL (ref 4–5.4)
RELATIVE EOSINOPHIL (OHS): 0.8 %
RELATIVE LYMPHOCYTE (OHS): 21.2 % (ref 18–48)
RELATIVE MONOCYTE (OHS): 7.5 % (ref 4–15)
RELATIVE NEUTROPHIL (OHS): 67.5 % (ref 38–73)
SODIUM SERPL-SCNC: 138 MMOL/L (ref 136–145)
WBC # BLD AUTO: 13.28 K/UL (ref 3.9–12.7)

## 2025-04-08 PROCEDURE — 11000001 HC ACUTE MED/SURG PRIVATE ROOM

## 2025-04-08 PROCEDURE — 63600175 PHARM REV CODE 636 W HCPCS: Performed by: NURSE PRACTITIONER

## 2025-04-08 PROCEDURE — 25000242 PHARM REV CODE 250 ALT 637 W/ HCPCS: Performed by: HOSPITALIST

## 2025-04-08 PROCEDURE — 25000003 PHARM REV CODE 250: Performed by: NURSE PRACTITIONER

## 2025-04-08 PROCEDURE — 36415 COLL VENOUS BLD VENIPUNCTURE: CPT | Performed by: HOSPITALIST

## 2025-04-08 PROCEDURE — 80053 COMPREHEN METABOLIC PANEL: CPT | Performed by: HOSPITALIST

## 2025-04-08 PROCEDURE — 94640 AIRWAY INHALATION TREATMENT: CPT

## 2025-04-08 PROCEDURE — 63600175 PHARM REV CODE 636 W HCPCS: Performed by: HOSPITALIST

## 2025-04-08 PROCEDURE — 25000003 PHARM REV CODE 250: Performed by: HOSPITALIST

## 2025-04-08 PROCEDURE — 99900035 HC TECH TIME PER 15 MIN (STAT)

## 2025-04-08 PROCEDURE — 27000221 HC OXYGEN, UP TO 24 HOURS

## 2025-04-08 PROCEDURE — 94761 N-INVAS EAR/PLS OXIMETRY MLT: CPT

## 2025-04-08 PROCEDURE — 27000646 HC AEROBIKA DEVICE

## 2025-04-08 PROCEDURE — 97116 GAIT TRAINING THERAPY: CPT | Mod: CQ

## 2025-04-08 PROCEDURE — 97530 THERAPEUTIC ACTIVITIES: CPT | Mod: CQ

## 2025-04-08 PROCEDURE — 94664 DEMO&/EVAL PT USE INHALER: CPT

## 2025-04-08 PROCEDURE — 85025 COMPLETE CBC W/AUTO DIFF WBC: CPT | Performed by: HOSPITALIST

## 2025-04-08 RX ADMIN — IPRATROPIUM BROMIDE AND ALBUTEROL SULFATE 3 ML: 2.5; .5 SOLUTION RESPIRATORY (INHALATION) at 08:04

## 2025-04-08 RX ADMIN — DICLOFENAC SODIUM 4 G: 10 GEL TOPICAL at 08:04

## 2025-04-08 RX ADMIN — HYDROCODONE BITARTRATE AND ACETAMINOPHEN 1 TABLET: 5; 325 TABLET ORAL at 05:04

## 2025-04-08 RX ADMIN — NIFEDIPINE 30 MG: 30 TABLET, FILM COATED, EXTENDED RELEASE ORAL at 09:04

## 2025-04-08 RX ADMIN — INSULIN ASPART 15 UNITS: 100 INJECTION, SOLUTION INTRAVENOUS; SUBCUTANEOUS at 11:04

## 2025-04-08 RX ADMIN — METHOCARBAMOL 500 MG: 500 TABLET ORAL at 09:04

## 2025-04-08 RX ADMIN — ENOXAPARIN SODIUM 40 MG: 40 INJECTION SUBCUTANEOUS at 05:04

## 2025-04-08 RX ADMIN — ALLOPURINOL 300 MG: 300 TABLET ORAL at 08:04

## 2025-04-08 RX ADMIN — IPRATROPIUM BROMIDE AND ALBUTEROL SULFATE 3 ML: 2.5; .5 SOLUTION RESPIRATORY (INHALATION) at 07:04

## 2025-04-08 RX ADMIN — HYDROCODONE BITARTRATE AND ACETAMINOPHEN 1 TABLET: 5; 325 TABLET ORAL at 09:04

## 2025-04-08 RX ADMIN — HYDROCODONE BITARTRATE AND ACETAMINOPHEN 1 TABLET: 5; 325 TABLET ORAL at 06:04

## 2025-04-08 RX ADMIN — MELATONIN TAB 3 MG 6 MG: 3 TAB at 09:04

## 2025-04-08 RX ADMIN — SENNOSIDES AND DOCUSATE SODIUM 1 TABLET: 50; 8.6 TABLET ORAL at 09:04

## 2025-04-08 RX ADMIN — LOSARTAN POTASSIUM 50 MG: 50 TABLET, FILM COATED ORAL at 08:04

## 2025-04-08 RX ADMIN — METHOCARBAMOL 500 MG: 500 TABLET ORAL at 05:04

## 2025-04-08 RX ADMIN — INSULIN ASPART 3 UNITS: 100 INJECTION, SOLUTION INTRAVENOUS; SUBCUTANEOUS at 09:04

## 2025-04-08 RX ADMIN — HYDROCODONE BITARTRATE AND ACETAMINOPHEN 1 TABLET: 5; 325 TABLET ORAL at 10:04

## 2025-04-08 RX ADMIN — NIFEDIPINE 30 MG: 30 TABLET, FILM COATED, EXTENDED RELEASE ORAL at 08:04

## 2025-04-08 RX ADMIN — METHOCARBAMOL 500 MG: 500 TABLET ORAL at 06:04

## 2025-04-08 RX ADMIN — SERTRALINE HYDROCHLORIDE 100 MG: 100 TABLET ORAL at 08:04

## 2025-04-08 RX ADMIN — ASPIRIN 81 MG: 81 TABLET, COATED ORAL at 08:04

## 2025-04-08 RX ADMIN — PREDNISONE 40 MG: 20 TABLET ORAL at 08:04

## 2025-04-08 RX ADMIN — INSULIN GLARGINE 30 UNITS: 100 INJECTION, SOLUTION SUBCUTANEOUS at 08:04

## 2025-04-08 RX ADMIN — IPRATROPIUM BROMIDE AND ALBUTEROL SULFATE 3 ML: 2.5; .5 SOLUTION RESPIRATORY (INHALATION) at 03:04

## 2025-04-08 RX ADMIN — PREGABALIN 150 MG: 75 CAPSULE ORAL at 09:04

## 2025-04-08 RX ADMIN — INSULIN GLARGINE 30 UNITS: 100 INJECTION, SOLUTION SUBCUTANEOUS at 09:04

## 2025-04-08 RX ADMIN — SENNOSIDES AND DOCUSATE SODIUM 1 TABLET: 50; 8.6 TABLET ORAL at 08:04

## 2025-04-08 RX ADMIN — IPRATROPIUM BROMIDE AND ALBUTEROL SULFATE 3 ML: 2.5; .5 SOLUTION RESPIRATORY (INHALATION) at 11:04

## 2025-04-08 RX ADMIN — GUAIFENESIN 600 MG: 600 TABLET, EXTENDED RELEASE ORAL at 09:04

## 2025-04-08 RX ADMIN — INSULIN ASPART 15 UNITS: 100 INJECTION, SOLUTION INTRAVENOUS; SUBCUTANEOUS at 05:04

## 2025-04-08 RX ADMIN — PREGABALIN 150 MG: 75 CAPSULE ORAL at 08:04

## 2025-04-08 RX ADMIN — METOPROLOL SUCCINATE ER TABLETS 100 MG: 50 TABLET, FILM COATED, EXTENDED RELEASE ORAL at 08:04

## 2025-04-08 RX ADMIN — PROMETHAZINE HYDROCHLORIDE AND CODEINE PHOSPHATE 10 ML: 6.25; 1 SOLUTION ORAL at 09:04

## 2025-04-08 RX ADMIN — GUAIFENESIN 600 MG: 600 TABLET, EXTENDED RELEASE ORAL at 08:04

## 2025-04-08 RX ADMIN — METHOCARBAMOL 500 MG: 500 TABLET ORAL at 10:04

## 2025-04-08 RX ADMIN — INSULIN ASPART 15 UNITS: 100 INJECTION, SOLUTION INTRAVENOUS; SUBCUTANEOUS at 08:04

## 2025-04-08 NOTE — PROGRESS NOTES
[facility] [address] Medicine  Progress Note    Patient Name: Indira Waters  MRN: [id number]  Patient Class: IP- Inpatient   Admission Date: 3/31/2025  Length of Stay: 8 days  Attending Physician: Juanis Alejandro MD  Primary Care Provider: Chun Zapata MD        Principal Problem:CAP (community acquired pneumonia)        HPI:  HPi by Rina Blanc NP:  Ms. Waters is a 80 YOF with PMHx of HFrEF (EF 40-50% 06/2024), CAD ([facility] 05/2022 with severe 2 vessel, predominantly distal, disease), aortic atherosclerosis with tortuous aorta, HTN, HLD, DM type II, diabetic polyneuropathy,COPD, chronic respiratory insuffiencey/chronic airflow limitation on home oxygen qHS , former smoker, CKD III (baseline SCr 1.1-1.2), chronic anemia, history of CVA with residual left-sided deficits, GERD, fibromyalgia, chronic pain, osteoarthritis, anxiety/depression, and obesity.     She presents to ED via EMS with her daughter due to generally feeling unwell the last couple of days with fatigue, malaise, subjective fevers, chills, shortness of breath, productive cough, upper respiratory congestion, chest pain, tachycardia, and decreased oxygen saturation from baseline requiring increased nasal cannula supplementation. Ms. Waters also has complaints of dysuria. Daughter states that prior to ED arrival she had a hallucination of someone sitting on her bed that was not there, prompting her to activate EMS. Ms. Waters denies abdominal pain, N/V/D, constipation, hematuria, decreased UOP, changes in bowel habits or blood in stool, decreased appetite, changes in PO intake, light headiness, dizziness, or headaches.  She lives with her daughter, uses ambualtory aides as needed, and requires some assistance with ADLs.     In the ED she is hypertensive, heart rate 80-100s, saturation stable on 2-3 L nasal cannula, and T-max 102.3°.  CBC with WBC 32, H and H 10.4/33, platelets 376.  Chemistry NA 34, K 4.4, chloride  101, CO2 21, BUN 21, SCr 1.4, glucose 328.  Phosphorus 4.2, magnesium 1.7.  LFTs unremarkable.  POCT lactic 1.70.  Procalcitonin 1.34.  UA noninfectious.  Blood cultures in process.  EKG with sinus tach and inferolateral ST and T-wave abnormality similar to priors.  CXR with  abnormal opacities left perihilar and right upper lobe opacities which can be seen with multilobar infection in this patient with provided history of sepsis.     The patient was admitted to the Hospital Medicine Service for further evaluation and management.     Overview/Hospital Course:  Patient admitted for treatment of sepsis due to multifocal pneumonia and associated severe hyperglycemia.  She had good improvement in her mental status, fever and shortness of breath and antibiotics were changed to ceftriaxone/azithromycin the following day.  She was on guaifenesin during the day for cough and a cough suppressant at night so she could rest.  PT/OT were consulted due to her poor conditioning and debility, recommended home health. Prednisone added for COPD exacerbation tx. Monitor BG.     Interval History: Feeling slightly better since steroids started. Cont to monitor. Daughter updated at bedside.     Review of Systems   Constitutional:  Negative for chills and fever.   Respiratory:  Positive for cough, shortness of breath and wheezing.    Cardiovascular:  Negative for chest pain and palpitations.   Musculoskeletal:  Positive for arthralgias and back pain.     Objective:     Vital Signs (Most Recent):  Temp: 97.7 °F (36.5 °C) (04/08/25 0807)  Pulse: 79 (04/08/25 0837)  Resp: 18 (04/08/25 1023)  BP: 132/68 (04/08/25 0837)  SpO2: 98 % (04/08/25 0807) Vital Signs (24h Range):  Temp:  [97.7 °F (36.5 °C)-98.3 °F (36.8 °C)] 97.7 °F (36.5 °C)  Pulse:  [72-87] 79  Resp:  [14-22] 18  SpO2:  [92 %-99 %] 98 %  BP: (126-148)/(59-70) 132/68     Weight: 98 kg (216 lb 0.8 oz)  Body mass index is 40.82 kg/m².    Intake/Output Summary (Last 24 hours) at  4/8/2025 1051  Last data filed at 4/8/2025 0715  Gross per 24 hour   Intake 480 ml   Output 2300 ml   Net -1820 ml         Physical Exam  Constitutional:       General: She is not in acute distress.  Pulmonary:      Effort: No respiratory distress.      Breath sounds: Wheezing present.      Comments: On NC   Abdominal:      General: There is no distension.      Tenderness: There is no abdominal tenderness.   Musculoskeletal:      Right lower leg: No edema.      Left lower leg: No edema.               Significant Labs: All pertinent labs within the past 24 hours have been reviewed.    Significant Imaging: I have reviewed all pertinent imaging results/findings within the past 24 hours.      Assessment & Plan  CAP (community acquired pneumonia); multilobular  Acute on chronic respiratory failure with hypoxia  On home oxygen therapy  COPD (chronic obstructive pulmonary disease)  CAFL (chronic airflow limitation)  Chronic bronchitis  Former smoker  -admitted due to community-acquired pneumonia, multilobular, in setting of known COPD, chronic airflow limitation, chronic respiratory failure on home oxygen, in former smoker  -at admission hypertensive otherwise hemoglobin stable and saturation stable on nasal cannula supplementation; T-max 102.3°   -ED workup detailed above however notable for WBC 32 and procalcitonin 1.34  -initiated on cefepime vancomycin in ED >>> good improvement overnight, change to ceftriaxone/azithromycin   -hold IV fluids at current D/T normal lactic and CHF history   -monitor for steroid need however wheezing minimal   -encourage PO intake as tolerated  -follow blood cultures   -sputum culture pending collection   -DuoNebs every 4 hours while awake   -incentive spirometer and flutter valve for pulmonary toileting - needs encouragement to use.  -PRN supportive care as indicated   -continue oxygen supplementation (on home oxygen)  -strict I&Os  -trend labs, address/replete electrolytes as  indicated  -Added guaifenesin during the day, codeine at night with improvement.  Requests melatonin.  - PT/OT consult due to deconditioning in setting of COPD and acute illness - home health recommended  - Treat constipation  - Tried a dose of Solu-medrol given wheezing - seems to have been helpful but made her hyperglycemic.  -4/7 had  planned for discharge but she is wheezing more and feels ill - will give additional dose of Solu-medrol and start a course of oral prednisone.  Antibiotic course complete.    Chronic combined systolic and diastolic congestive heart failure  Essential hypertension  -chronic, BP elevated at admission, some LE edema on physical exam  -most recent TTE 06/2024 with EF 40-50% mild pulmonary HTN with PASP 45  -holding IVF resuscitation as detailed above  -continue home losartan, metoprolol, and nifedipine with hold parameters   -holding home torsemide for now >>> resume when clinically appropriate  -dose/medication adjustment as appropriate   -strict I&Os   -trend labs, address/replete electrolytes as indicated    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 pectoris  Athscl heart disease of native coronary artery w/o ang pctrs  -history of   -[facility] 05/2022 detailed in HPI  -continue home ASA  -hold home fenofibrate and repatha for now >> resume at DC  -EKG PRN concerns     History of CVA (cerebrovascular accident)  Hemiplegia and hemiparesis following cerebral infarction affecting left non-dominant side  -history of   -continue home ASA  -hold home fenofibrate and repatha for now >> resume at DC  -fall precautions     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 disease  -chronic, not well controlled   -hemoglobin A1c 7.9/180 3/06/2025   -hold home Lantus 26 units BID and lispro 15 units with breakfast, 15 units with lunch, N 11 units with dinner plus low-dose SSI   -begin dose reduced basal and prandial insulin plus low-dose SSI  -dose/medication  adjustment as appropriate   -monitor accuchecks AC/HS and PRN hypoglycemic protocol   -4/1 - change to home doses of basal insulin, increase prandial.  Persistently hyperglycemic, per daughter this is typical when she gets an infection.  -BG higher after Solu-Medrol dose and now resuming steroids so will increase insulin again to make up for it.    Gastroesophageal reflux disease without esophagitis  -chronic  -PRN supportive care as indicated    Stage 3 chronic kidney disease  -chronic  -baseline SCr 1.1-1.2  -at admission SCr 1.4  -avoid nephrotoxins and hypotension as able, renally dose medications  -trend labs, address/replete electrolytes as indicated    Depression, unspecified  Generalized anxiety disorder  Recurrent major depressive disorder, in remission  -chronic  -continue home sertraline  -additional PRN supportive care as indicated     Chronic pain syndrome  Fibromyalgia  -chronic  -PRN supportive care for pain  -fall precautions      VTE Risk Mitigation (From admission, onward)           Ordered     enoxaparin injection 40 mg  Daily         03/31/25 1639     IP VTE HIGH RISK PATIENT  Once         03/31/25 1639     Place sequential compression device  Until discontinued         03/31/25 1639                    Discharge Planning   KYLE: 4/9/2025     Code Status: Full Code   Medical Readiness for Discharge Date:   Discharge Plan A: Home with family, Home Health   Discharge Delays: None known at this time                  Juanis Dubois MD  Department of Hospital Medicine   [facility] [place] ([address])

## 2025-04-08 NOTE — ASSESSMENT & PLAN NOTE
-history of   -[facility] 05/2022 detailed in HPI  -continue home ASA  -hold home fenofibrate and repatha for now >> resume at DC  -EKG PRN concerns

## 2025-04-08 NOTE — PLAN OF CARE
Problem: Diabetes Comorbidity  Goal: Blood Glucose Level Within Targeted Range  Outcome: Progressing     Problem: Infection  Goal: Absence of Infection Signs and Symptoms  Outcome: Progressing     Problem: Bariatric Environmental Safety  Goal: Safety Maintained with Care  Outcome: Progressing     Problem: Adult Inpatient Plan of Care  Goal: Readiness for Transition of Care  Outcome: Progressing     Problem: Adult Inpatient Plan of Care  Goal: Optimal Comfort and Wellbeing  Outcome: Progressing

## 2025-04-08 NOTE — PT/OT/SLP PROGRESS
Physical Therapy Treatment    Patient Name:  Indira Waters   MRN:  [id number]    Recommendations:     Discharge Recommendations: Low Intensity Therapy  Discharge Equipment Recommendations: none  Barriers to discharge: Inaccessible home    Assessment:     Indira Waters is a 80 y.o. female admitted with a medical diagnosis of CAP (community acquired pneumonia).  She presents with the following impairments/functional limitations: weakness, gait instability, pain, edema, impaired balance, impaired endurance, impaired functional mobility, impaired self care skills, impaired skin, decreased coordination, decreased lower extremity function, decreased ROM, decreased safety awareness, decreased upper extremity function.    Supine<>sit with SBA  Sit>stand with rollator and SBA, cueing for hand placement on sit surface  Toilet transfer with RW and SBA  Amb 104' with rollator and CGA progressing to SBA, 2L O2, pt c/o increasing back pain toward end of bout  Ascended/descended 4 steps with B HR and CGA  SpO2 93% during activity, HR 92 bpm  Pt approaching baseline, feeling better  Rec Low Intensity Therapy    Rehab Prognosis: Fair; patient would benefit from acute skilled PT services to address these deficits and reach maximum level of function.    Recent Surgery: * No surgery found *      Plan:     During this hospitalization, patient to be seen 5 x/week to address the identified rehab impairments via gait training, therapeutic activities, therapeutic exercises, neuromuscular re-education and progress toward the following goals:    Plan of Care Expires:  04/18/25    Subjective     Chief Complaint: back pain with gait bout  Patient/Family Comments/goals: dtr remarked that pt is a little more energetic at home, but she's getting closer to baseline  Pain/Comfort:  Pain Rating 1: 0/10  Pain Rating Post-Intervention 1: 0/10      Objective:     Communicated with nurse Griffiths prior to session.  Patient found HOB elevated  with peripheral IV, PureWick, oxygen upon PT entry to room.     General Precautions: Standard, fall  Orthopedic Precautions: N/A  Braces: N/A  Respiratory Status: Nasal cannula, flow 2 L/min     Functional Mobility:  Bed Mobility:     Supine to Sit: stand by assistance  Sit to Supine: stand by assistance  Transfers:     Sit to Stand:  stand by assistance with 4 wheeled walker  Toilet Transfer: stand by assistance with  rolling walker  using  Step Transfer  Gait: 104' with rollator and CGA progressing to SBA, 2L O2, pt c/o increasing back pain toward end of bout  Stairs:  Pt ascended/descended 4 stair(s) with No Assistive Device with bilateral handrails with Contact Guard Assistance.       AM-PAC 6 CLICK MOBILITY  Turning over in bed (including adjusting bedclothes, sheets and blankets)?: 4  Sitting down on and standing up from a chair with arms (e.g., wheelchair, bedside commode, etc.): 3  Moving from lying on back to sitting on the side of the bed?: 3  Moving to and from a bed to a chair (including a wheelchair)?: 3  Need to walk in hospital room?: 3  Climbing 3-5 steps with a railing?: 3  Basic Mobility Total Score: 19       Treatment & Education:  Gait and stair training as noted    Patient left HOB elevated with all lines intact, call button in reach, and daughter present..    GOALS:   Multidisciplinary Problems       Physical Therapy Goals          Problem: Physical Therapy    Goal Priority Disciplines Outcome Interventions   Physical Therapy Goal     PT, PT/OT Progressing    Description: P.T goals to be met in 2 weeks as follows:  1. Mod I Bed Mobility  2. Mod I T/F w LRAD  3. Gait 150' Mod I with LRAD  4. Pt will ascend/descend 4 steps with Min A                       DME Justifications:  No DME recommended requiring DME justifications    Time Tracking:     PT Received On: 04/08/25  PT Start Time: 0834     PT Stop Time: 0928  PT Total Time (min): 54 min     Billable Minutes: Gait Training 30 and Therapeutic  Activity 24    Treatment Type: Treatment  PT/PTA: PTA     Number of PTA visits since last PT visit: 1 04/08/2025

## 2025-04-08 NOTE — ASSESSMENT & PLAN NOTE
-history of   -[facility] 05/2022 detailed in HPI  -continue home ASA  -hold home fenofibrate and repatha for now >> resume at DC  -EKG PRN concerns

## 2025-04-08 NOTE — SUBJECTIVE & OBJECTIVE
Interval History: Feeling slightly better since steroids started. Cont to monitor. Daughter updated at bedside.     Review of Systems   Constitutional:  Negative for chills and fever.   Respiratory:  Positive for cough, shortness of breath and wheezing.    Cardiovascular:  Negative for chest pain and palpitations.   Musculoskeletal:  Positive for arthralgias and back pain.     Objective:     Vital Signs (Most Recent):  Temp: 97.7 °F (36.5 °C) (04/08/25 0807)  Pulse: 79 (04/08/25 0837)  Resp: 18 (04/08/25 1023)  BP: 132/68 (04/08/25 0837)  SpO2: 98 % (04/08/25 0807) Vital Signs (24h Range):  Temp:  [97.7 °F (36.5 °C)-98.3 °F (36.8 °C)] 97.7 °F (36.5 °C)  Pulse:  [72-87] 79  Resp:  [14-22] 18  SpO2:  [92 %-99 %] 98 %  BP: (126-148)/(59-70) 132/68     Weight: 98 kg (216 lb 0.8 oz)  Body mass index is 40.82 kg/m².    Intake/Output Summary (Last 24 hours) at 4/8/2025 1051  Last data filed at 4/8/2025 0715  Gross per 24 hour   Intake 480 ml   Output 2300 ml   Net -1820 ml         Physical Exam  Constitutional:       General: She is not in acute distress.  Pulmonary:      Effort: No respiratory distress.      Breath sounds: Wheezing present.      Comments: On NC   Abdominal:      General: There is no distension.      Tenderness: There is no abdominal tenderness.   Musculoskeletal:      Right lower leg: No edema.      Left lower leg: No edema.               Significant Labs: All pertinent labs within the past 24 hours have been reviewed.    Significant Imaging: I have reviewed all pertinent imaging results/findings within the past 24 hours.

## 2025-04-08 NOTE — PLAN OF CARE
Problem: Adult Inpatient Plan of Care  Goal: Plan of Care Review  4/7/2025 2139 by Adeline Ronquillo RN  Outcome: Progressing  4/7/2025 2138 by Adeline Ronquillo RN  Outcome: Progressing  Goal: Patient-Specific Goal (Individualized)  4/7/2025 2139 by Adeline Ronquillo RN  Outcome: Progressing  4/7/2025 2138 by Adeline Ronquillo RN  Outcome: Progressing  Goal: Absence of Hospital-Acquired Illness or Injury  4/7/2025 2139 by Adeline Ronquillo RN  Outcome: Progressing  4/7/2025 2138 by Adeline Ronquillo RN  Outcome: Progressing  Goal: Optimal Comfort and Wellbeing  4/7/2025 2139 by Adeline Ronquillo RN  Outcome: Progressing  4/7/2025 2138 by Adeline Ronquillo RN  Outcome: Progressing  Goal: Readiness for Transition of Care  4/7/2025 2139 by Adeline Ronquillo RN  Outcome: Progressing  4/7/2025 2138 by Adeline Ronquillo RN  Outcome: Progressing     Problem: Bariatric Environmental Safety  Goal: Safety Maintained with Care  4/7/2025 2139 by Adeline Ronquillo RN  Outcome: Progressing  4/7/2025 2138 by Adeline Ronquillo RN  Outcome: Progressing     Problem: Infection  Goal: Absence of Infection Signs and Symptoms  4/7/2025 2139 by Adeline Ronquillo RN  Outcome: Progressing  4/7/2025 2138 by Adeline Ronquillo RN  Outcome: Progressing     Problem: Diabetes Comorbidity  Goal: Blood Glucose Level Within Targeted Range  4/7/2025 2139 by Adeline Ronquillo RN  Outcome: Progressing  4/7/2025 2138 by Adeline Ronquillo RN  Outcome: Progressing     Problem: Pneumonia  Goal: Fluid Balance  4/7/2025 2139 by Adeline Ronquillo RN  Outcome: Progressing  4/7/2025 2138 by Adeline Ronquillo RN  Outcome: Progressing  Goal: Resolution of Infection Signs and Symptoms  4/7/2025 2139 by Adeline Ronquillo RN  Outcome: Progressing  4/7/2025 2138 by Adeline Ronquillo RN  Outcome: Progressing  Goal: Effective Oxygenation and Ventilation  4/7/2025 2139 by Adeline Ronquillo, RN  Outcome: Progressing  4/7/2025 2138 by Adeline Ronquillo, RN  Outcome:  Progressing     Problem: Skin Injury Risk Increased  Goal: Skin Health and Integrity  4/7/2025 2139 by Adeline Ronquillo, RN  Outcome: Progressing  4/7/2025 2138 by Adeline Ronquillo, RN  Outcome: Progressing

## 2025-04-09 VITALS
RESPIRATION RATE: 16 BRPM | SYSTOLIC BLOOD PRESSURE: 175 MMHG | BODY MASS INDEX: 40.79 KG/M2 | DIASTOLIC BLOOD PRESSURE: 79 MMHG | OXYGEN SATURATION: 98 % | HEART RATE: 74 BPM | HEIGHT: 61 IN | WEIGHT: 216.06 LBS | TEMPERATURE: 98 F

## 2025-04-09 LAB — POCT GLUCOSE: 296 MG/DL (ref 70–110)

## 2025-04-09 PROCEDURE — 94799 UNLISTED PULMONARY SVC/PX: CPT

## 2025-04-09 PROCEDURE — 99900035 HC TECH TIME PER 15 MIN (STAT)

## 2025-04-09 PROCEDURE — 25000242 PHARM REV CODE 250 ALT 637 W/ HCPCS: Performed by: HOSPITALIST

## 2025-04-09 PROCEDURE — 27000221 HC OXYGEN, UP TO 24 HOURS

## 2025-04-09 PROCEDURE — 94640 AIRWAY INHALATION TREATMENT: CPT

## 2025-04-09 PROCEDURE — 94664 DEMO&/EVAL PT USE INHALER: CPT

## 2025-04-09 PROCEDURE — 94761 N-INVAS EAR/PLS OXIMETRY MLT: CPT

## 2025-04-09 PROCEDURE — 25000003 PHARM REV CODE 250: Performed by: HOSPITALIST

## 2025-04-09 PROCEDURE — 25000003 PHARM REV CODE 250: Performed by: NURSE PRACTITIONER

## 2025-04-09 PROCEDURE — 63600175 PHARM REV CODE 636 W HCPCS: Performed by: HOSPITALIST

## 2025-04-09 RX ORDER — INSULIN GLARGINE 100 [IU]/ML
26 INJECTION, SOLUTION SUBCUTANEOUS 2 TIMES DAILY
Status: DISCONTINUED | OUTPATIENT
Start: 2025-04-09 | End: 2025-04-09 | Stop reason: HOSPADM

## 2025-04-09 RX ADMIN — IPRATROPIUM BROMIDE AND ALBUTEROL SULFATE 3 ML: 2.5; .5 SOLUTION RESPIRATORY (INHALATION) at 11:04

## 2025-04-09 RX ADMIN — NIFEDIPINE 30 MG: 30 TABLET, FILM COATED, EXTENDED RELEASE ORAL at 08:04

## 2025-04-09 RX ADMIN — IPRATROPIUM BROMIDE AND ALBUTEROL SULFATE 3 ML: 2.5; .5 SOLUTION RESPIRATORY (INHALATION) at 07:04

## 2025-04-09 RX ADMIN — INSULIN GLARGINE 26 UNITS: 100 INJECTION, SOLUTION SUBCUTANEOUS at 08:04

## 2025-04-09 RX ADMIN — HYDROCODONE BITARTRATE AND ACETAMINOPHEN 1 TABLET: 5; 325 TABLET ORAL at 05:04

## 2025-04-09 RX ADMIN — PREGABALIN 150 MG: 75 CAPSULE ORAL at 08:04

## 2025-04-09 RX ADMIN — ASPIRIN 81 MG: 81 TABLET, COATED ORAL at 08:04

## 2025-04-09 RX ADMIN — METHOCARBAMOL 500 MG: 500 TABLET ORAL at 05:04

## 2025-04-09 RX ADMIN — METOPROLOL SUCCINATE ER TABLETS 100 MG: 50 TABLET, FILM COATED, EXTENDED RELEASE ORAL at 08:04

## 2025-04-09 RX ADMIN — ALLOPURINOL 300 MG: 300 TABLET ORAL at 08:04

## 2025-04-09 RX ADMIN — LOSARTAN POTASSIUM 50 MG: 50 TABLET, FILM COATED ORAL at 08:04

## 2025-04-09 RX ADMIN — SERTRALINE HYDROCHLORIDE 100 MG: 100 TABLET ORAL at 08:04

## 2025-04-09 RX ADMIN — INSULIN ASPART 15 UNITS: 100 INJECTION, SOLUTION INTRAVENOUS; SUBCUTANEOUS at 08:04

## 2025-04-09 RX ADMIN — SENNOSIDES AND DOCUSATE SODIUM 1 TABLET: 50; 8.6 TABLET ORAL at 08:04

## 2025-04-09 RX ADMIN — METHOCARBAMOL 500 MG: 500 TABLET ORAL at 09:04

## 2025-04-09 RX ADMIN — HYDROCODONE BITARTRATE AND ACETAMINOPHEN 1 TABLET: 5; 325 TABLET ORAL at 09:04

## 2025-04-09 RX ADMIN — GUAIFENESIN 600 MG: 600 TABLET, EXTENDED RELEASE ORAL at 08:04

## 2025-04-09 NOTE — PLAN OF CARE
Problem: Pneumonia  Goal: Effective Oxygenation and Ventilation  Outcome: Met     Problem: Skin Injury Risk Increased  Goal: Skin Health and Integrity  Outcome: Met     Problem: Diabetes Comorbidity  Goal: Blood Glucose Level Within Targeted Range  Outcome: Met     Problem: Infection  Goal: Absence of Infection Signs and Symptoms  Outcome: Met     Problem: Adult Inpatient Plan of Care  Goal: Optimal Comfort and Wellbeing  Outcome: Met     Problem: Adult Inpatient Plan of Care  Goal: Readiness for Transition of Care  Outcome: Met

## 2025-04-09 NOTE — PLAN OF CARE
Case Management Final Discharge Note    Discharge Disposition: home with family and HH ([facility])    New DME ordered / company name: none    Relevant SDOH / Transition of Care Barriers:  none    Person available to provide assistance at home when needed and their contact information: Esha Severino (Daughter)  [phone]     Scheduled followup appointment: PCP    Referrals placed: none    Transportation: Patient daughter to transport patient home.         Patient and family educated on discharge services and updated on DC plan. Bedside RN notified, patient clear to discharge from Case Management Perspective.       [facility] ([address])  Discharge Final Note    Primary Care Provider: Chun Zapata MD    Expected Discharge Date: 4/9/2025    Final Discharge Note (most recent)       Final Note - 04/09/25 1036          Final Note    Assessment Type Final Discharge Note     Anticipated Discharge Disposition Home-Health Care [facility]     Hospital Resources/Appts/Education Provided Provided patient/caregiver with written discharge plan information;Appointments scheduled and added to AVS        Post-Acute Status    Post-Acute Authorization Home Health     Home Health Status Set-up Complete/Auth obtained     Discharge Delays None known at this time                     Important Message from Medicare  Important Message from Medicare regarding Discharge Appeal Rights: Explained to patient/caregiver, Signed/date by patient/caregiver     Date IMM was signed: 04/03/25  Time IMM was signed: 1119     Follow-up providers       Chun Zapata MD   Specialty: Family Medicine   Relationship: PCP - General    [facility] [address]  [facility] [address]   Phone: [phone]       Next Steps: Follow up              After-discharge care                Home Medical Care       [facility]   Service: Home Health Services    [address] [facility] [address]   Phone: [phone]

## 2025-04-09 NOTE — PT/OT/SLP PROGRESS
Physical Therapy      Patient Name:  Indira Waters   MRN:  [id number]    Patient not seen today secondary to Therapist assessment (pt about to have a shower, which she expects will require a lot of energy/strength (and pt d/c'ing today)). Will follow-up later today to address any needs pre-discharge.

## 2025-04-09 NOTE — PLAN OF CARE
04/09/25 0823   Post-Acute Status   Post-Acute Authorization Home Health   Home Health Status Referrals Sent   Patient choice form signed by patient/caregiver List with quality metrics by geographic area provided;List from CMS Compare;List from System Post-Acute Care   Discharge Delays None known at this time   Discharge Plan   Discharge Plan A Home with family;Home Health   Discharge Plan B Home with family     Met with patient and daughter to review discharge recommendation of HH and is agreeable to plan    Patient/family provided list of facilities in-network with patient's payor plan. Providers that are owned, operated, or affiliated with [facility] are included on the list.     Notified that referral sent to below listed facilities from in-network list based on proximity to home/family support:   [facility]      Patient/family instructed to identify preference.    Preferred Facility: (if more than 1, listed in order of descending preference)  [facility]    Patient has declined to select a preferred provider and elects placement with the first accepting in network provider that is available to provide services as ordered by the physician       If an additional preferred facility not listed above is identified, additional referral to be sent. If above facilities unable to accept, will send additional referrals to in-network providers.

## 2025-04-09 NOTE — PLAN OF CARE
Medicare Message     Important Message from Medicare regarding Discharge Appeal Rights Explained to patient/caregiver; Signed/date by patient/caregiver Explained to patient/caregiver; Signed/date by patient/caregiver Explained to patient/caregiver; Signed/date by patient/caregiver   Date IMM was signed 3/31/2025 4/3/2025 4/9/2025   Time IMM was signed [id number]

## 2025-04-09 NOTE — DISCHARGE SUMMARY
[facility] Surg [address] Medicine  Discharge Summary      Patient Name: Indira Waters  MRN: [id number]  [facility]: [id number]  Patient Class: IP- Inpatient  Admission Date: 3/31/2025  Hospital Length of Stay: 9 days  Discharge Date and Time: 04/09/2025 11:03 AM  Attending Physician: Juanis Alejandro MD   Discharging Provider: Juanis Dubois MD  Primary Care Provider: Chun Zapata MD    Primary Care Team: Networked reference to record PCT     HPI:   HPi by Rina Blanc NP:  Ms. Waters is a 80 YOF with PMHx of HFrEF (EF 40-50% 06/2024), CAD ([facility] 05/2022 with severe 2 vessel, predominantly distal, disease), aortic atherosclerosis with tortuous aorta, HTN, HLD, DM type II, diabetic polyneuropathy,COPD, chronic respiratory insuffiencey/chronic airflow limitation on home oxygen qHS , former smoker, CKD III (baseline SCr 1.1-1.2), chronic anemia, history of CVA with residual left-sided deficits, GERD, fibromyalgia, chronic pain, osteoarthritis, anxiety/depression, and obesity.     She presents to ED via EMS with her daughter due to generally feeling unwell the last couple of days with fatigue, malaise, subjective fevers, chills, shortness of breath, productive cough, upper respiratory congestion, chest pain, tachycardia, and decreased oxygen saturation from baseline requiring increased nasal cannula supplementation. Ms. Waters also has complaints of dysuria. Daughter states that prior to ED arrival she had a hallucination of someone sitting on her bed that was not there, prompting her to activate EMS. Ms. Waters denies abdominal pain, N/V/D, constipation, hematuria, decreased UOP, changes in bowel habits or blood in stool, decreased appetite, changes in PO intake, light headiness, dizziness, or headaches.  She lives with her daughter, uses ambualtory aides as needed, and requires some assistance with ADLs.     In the ED she is hypertensive, heart rate 80-100s, saturation stable on 2-3 L  nasal cannula, and T-max 102.3°.  CBC with WBC 32, H and H 10.4/33, platelets 376.  Chemistry NA 34, K 4.4, chloride 101, CO2 21, BUN 21, SCr 1.4, glucose 328.  Phosphorus 4.2, magnesium 1.7.  LFTs unremarkable.  POCT lactic 1.70.  Procalcitonin 1.34.  UA noninfectious.  Blood cultures in process.  EKG with sinus tach and inferolateral ST and T-wave abnormality similar to priors.  CXR with  abnormal opacities left perihilar and right upper lobe opacities which can be seen with multilobar infection in this patient with provided history of sepsis.     The patient was admitted to the Hospital Medicine Service for further evaluation and management.     Hospital Course:   Patient admitted for treatment of sepsis due to multifocal pneumonia and associated severe hyperglycemia.  She had good improvement in her mental status, fever and shortness of breath and antibiotics were changed to ceftriaxone/azithromycin the following day - completed course.  She was on guaifenesin during the day for cough and a cough suppressant at night so she could rest.  PT/OT were consulted due to her poor conditioning and debility, recommended home health. Prednisone added for COPD exacerbation tx. Monitor BG. Pt SOB and wheezing improved and more stable for discharge. Daughter updated at bedside.      Goals of Care Treatment Preferences:  Code Status: Full Code        Final Active Diagnoses:    Diagnosis Date Noted POA    PRINCIPAL PROBLEM:  CAP (community acquired pneumonia); multilobular [J18.9] 10/01/2021 Yes    On home oxygen therapy [Z99.81] 03/31/2025 Not Applicable    Acute on chronic respiratory failure with hypoxia [J96.21] 07/19/2024 Yes    Chronic bronchitis [J42] 07/19/2024 Yes    COPD (chronic obstructive pulmonary disease) [J44.9] 07/19/2024 Yes    History of CVA (cerebrovascular accident) [Z86.73] 07/19/2024 Not Applicable    Recurrent major depressive disorder, in remission [F33.40] 06/20/2022 Yes    Coronary artery disease of  native artery of native heart with stable angina pectoris [I25.118] 05/04/2022 Yes    Depression, unspecified [F32.A] 11/10/2021 Yes    Chronic combined systolic and diastolic congestive heart failure [I50.42] 09/15/2021 Yes    Hemiplegia and hemiparesis following cerebral infarction affecting left non-dominant side [I69.354] 09/15/2021 Not Applicable    Athscl heart disease of native coronary artery w/o ang pctrs [I25.10] 09/15/2021 Yes    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 disease [E11.22] 09/15/2021 Yes    Stage 3 chronic kidney disease [N18.30] 09/15/2021 Yes    Generalized anxiety disorder [F41.1] 09/15/2021 Yes    Essential hypertension [I10] 09/10/2021 Yes     Chronic    Former smoker [Z87.891] 06/11/2021 Not Applicable    Chronic pain syndrome [G89.4] 05/22/2014 Yes    CAFL (chronic airflow limitation) [J44.9] 04/29/2014 Yes    Gastroesophageal reflux disease without esophagitis [K21.9] 04/29/2014 Yes     Chronic    Fibromyalgia [M79.7] 04/29/2014 Yes     Chronic      Problems Resolved During this Admission:       Discharged Condition: good    Disposition: Home or Self Care    Follow Up:   Contact information for follow-up providers       Chun Zapata MD Follow up.    Specialty: Family Medicine  Contact information:  [address]  [facility] [address]  [phone]                       Contact information for after-discharge care       Home Medical [facility]       [facility] .    Service: Home Health Services  Contact information:  [address] [address]  [phone]                                 Patient Instructions:      Ambulatory referral/consult to Outpatient Case Management   Referral Priority: Routine Referral Type: Consultation   Referral Reason: Specialty Services Required   Number of Visits Requested: 1         Medications:  Reconciled Home Medications:      Medication List        CONTINUE taking these medications   "    acetaminophen 500 MG tablet  Commonly known as: TYLENOL  Take 2 tablets (1,000 mg total) by mouth every 8 (eight) hours as needed for Pain.     albuterol 1.25 mg/3 mL Nebu  Commonly known as: ACCUNEB  Take 3 mLs (1.25 mg total) by nebulization every 6 (six) hours as needed (for wheezing or shortness of breath). Rescue     allopurinoL 300 MG tablet  Commonly known as: ZYLOPRIM  Take 1 tablet (300 mg total) by mouth once daily.     aspirin 81 MG EC tablet  Commonly known as: ECOTRIN  Take 1 tablet (81 mg total) by mouth once daily.     BD ULTRA-FINE MICRO PEN NEEDLE 32 gauge x 1/4" Ndle  Generic drug: pen needle, diabetic  Use with insulin 5 times a day.     blood sugar diagnostic Strp  To check BG 6 times daily, to use with insurance preferred meter     CombiVENT RESPIMAT  mcg/actuation inhaler  Generic drug: ipratropium-albuteroL  Inhale 1 puff into the lungs every 6 (six) hours.     fenofibrate micronized 134 MG Cap  Commonly known as: LOFIBRA  Take 1 capsule (134 mg total) by mouth daily with breakfast.     HYDROcodone-acetaminophen  mg per tablet  Commonly known as: NORCO  Take 1 tablet by mouth every 8 (eight) hours as needed for Pain.     insulin lispro 100 unit/mL pen  Commonly known as: HumaLOG KwikPen Insulin  Inject 15 Units into the skin before breakfast AND 15 Units daily with lunch AND 11 Units daily with dinner or evening meal. Plus correction scale. Max TDD 50units..     lancets 30 gauge Misc  Commonly known as: TRUEPLUS LANCETS  Check blood sugar 4 times daily     LANTUS SOLOSTAR U-100 INSULIN 100 unit/mL (3 mL) Inpn pen  Generic drug: insulin glargine U-100 (Lantus)  Inject 26 Units into the skin 2 (two) times a day.     losartan 50 MG tablet  Commonly known as: COZAAR  Take 1 tablet (50 mg total) by mouth once daily.     melatonin 3 mg tablet  Commonly known as: MELATIN  Take 2 tablets (6 mg total) by mouth nightly as needed for Insomnia.     methocarbamoL 500 MG Tab  Commonly known " as: Robaxin  Take 1 tablet (500 mg total) by mouth 3 (three) times daily as needed (muscle pain).     metoclopramide HCl 5 MG tablet  Commonly known as: REGLAN  TAKE ONE TABLET BY MOUTH FOUR TIMES DAILY AS NEEDED FOR nausea prevention     metoprolol succinate 100 MG 24 hr tablet  Commonly known as: TOPROL-XL  Take 1 tablet (100 mg total) by mouth once daily.     miconazole nitrate 2% 2 % Oint  Commonly known as: MICOTIN  Apply topically 2 (two) times daily. For 10 days     NIFEdipine 30 MG (OSM) 24 hr tablet  Commonly known as: PROCARDIA-XL  TAKE ONE TABLET BY MOUTH TWICE DAILY     nitroGLYCERIN 0.4 MG SL tablet  Commonly known as: NITROSTAT  DISSOLVE 1 TABLET UNDER THE TONGUE EVERY 5 MINUTES AS NEEDED FOR CHEST PAIN. DO NOT EXCEED A TOTAL OF 3 DOSES IN 15 MINUTES.     pregabalin 150 MG capsule  Commonly known as: LYRICA  Take 1 capsule (150 mg total) by mouth 2 (two) times daily.     REPATHA SURECLICK 140 mg/mL Pnij  Generic drug: evolocumab  Inject 1 mL (140 mg total) into the skin every 14 (fourteen) days.     sertraline 100 MG tablet  Commonly known as: ZOLOFT  TAKE ONE TABLET BY MOUTH EVERY DAY     STOOL SOFTENER-STIMULANT LAXAT 8.6-50 mg per tablet  Generic drug: senna-docusate  Take 1 tablet by mouth 2 (two) times daily as needed for Constipation.     torsemide 20 MG Tab  Commonly known as: DEMADEX  Take 1 tablet (20 mg total) by mouth once daily.                Time spent on the discharge of patient: 35 minutes         Juanis Dubois MD  Department of Hospital Medicine  [facility] ([address])

## 2025-04-10 ENCOUNTER — PATIENT OUTREACH (OUTPATIENT)
Dept: ADMINISTRATIVE | Facility: CLINIC | Age: 80
End: 2025-04-10
Payer: MEDICARE

## 2025-04-10 NOTE — TELEPHONE ENCOUNTER
Dr. Zapata doesn't have any openings sooner. I will contact patient daughter if something comes available.

## 2025-04-10 NOTE — PROGRESS NOTES
C3 nurse spoke with Indira Waters, patient's daughter, Esha, for a TCC post hospital discharge follow up call. The patient has a scheduled HOSFU appointment with Chun Zapata MD  on 04/23/2025 @ 10:30AM.

## 2025-04-11 ENCOUNTER — PATIENT MESSAGE (OUTPATIENT)
Dept: DIABETES | Facility: CLINIC | Age: 80
End: 2025-04-11
Payer: MEDICARE

## 2025-04-11 ENCOUNTER — OUTPATIENT CASE MANAGEMENT (OUTPATIENT)
Dept: ADMINISTRATIVE | Facility: OTHER | Age: 80
End: 2025-04-11
Payer: MEDICARE

## 2025-04-11 NOTE — LETTER
Indira Waters  [facility]   [place] [address]      Dear Indira Waters,     I work with [facility]'s Outpatient Care Management Department. We received a referral to call you to discuss your medical history. These services are free of charge and are offered to [facility] patients who have recently been discharged from any of our facilities or who have medical conditions that may require the skill of a nurse to assist with management.     I am a Registered Nurse who specializes in connecting patients with available medical and financial resources as well as addressing any educational needs that may be indicated.    I attempted to reach you by telephone, but I was unsuccessful. Please call our department so that we can go over some questions with you, regarding your health.    The Outpatient Care Management Department can be reached at [phone], from 8:00AM to 4:30 PM, on Monday thru Friday.     Additionally, [facility] also has a program where a nurse is available 24/7 to answer questions or provide medical advice, their number is [phone].      Thanks,    Paola Malhotra RN  Outpatient Care Management  Phone #: [phone]

## 2025-04-14 ENCOUNTER — TELEPHONE (OUTPATIENT)
Dept: INTERNAL MEDICINE | Facility: CLINIC | Age: 80
End: 2025-04-14
Payer: MEDICARE

## 2025-04-14 ENCOUNTER — NURSE TRIAGE (OUTPATIENT)
Dept: ADMINISTRATIVE | Facility: CLINIC | Age: 80
End: 2025-04-14
Payer: MEDICARE

## 2025-04-14 NOTE — TELEPHONE ENCOUNTER
Patient c/o a rash. Received a phone call from PCP during triage. Patient hung up.    Reason for Disposition   Caller has already spoken with the PCP (or office), and has no further questions    Protocols used: No Contact or Duplicate Contact Call-A-[place]

## 2025-04-14 NOTE — TELEPHONE ENCOUNTER
Spoke to patient daughter and she's asking for something to be prescribed for her mom saying that she has a really bad rash on her private area that she had sent her hospital stay. Patient has an appointment on tomorrow.

## 2025-04-14 NOTE — TELEPHONE ENCOUNTER
"----- Message from DeirdreArron sent at 4/14/2025  2:14 PM CDT -----  Type: Patient CallWho Called: Patient's daughterHany Balbuena Does the patient know what this is regarding? Requesting a call back asap to discuss a prescription being written for the pt. Pt had a rash while in the hospital and the daughter tried to get medication then and they didn't. Pt has a rash that has her genitalia and buttocks that is "raw". Please advise Does the patient rather a call back or a response via [facility]? [facility] Call Back Number: [phone] Additional Information:  "

## 2025-04-14 NOTE — TELEPHONE ENCOUNTER
Patient already has an appointment scheduled on tomorrow...Informed patient daughter that Dr. Zapata said can use heavy application demi's buttpaste right away.

## 2025-04-15 ENCOUNTER — OFFICE VISIT (OUTPATIENT)
Dept: INTERNAL MEDICINE | Facility: CLINIC | Age: 80
End: 2025-04-15
Payer: MEDICARE

## 2025-04-15 ENCOUNTER — TELEPHONE (OUTPATIENT)
Dept: INTERNAL MEDICINE | Facility: CLINIC | Age: 80
End: 2025-04-15
Payer: MEDICARE

## 2025-04-15 VITALS
OXYGEN SATURATION: 97 % | SYSTOLIC BLOOD PRESSURE: 123 MMHG | HEIGHT: 61 IN | TEMPERATURE: 98 F | HEART RATE: 71 BPM | BODY MASS INDEX: 40.82 KG/M2 | DIASTOLIC BLOOD PRESSURE: 56 MMHG

## 2025-04-15 DIAGNOSIS — K21.9 GASTROESOPHAGEAL REFLUX DISEASE WITHOUT ESOPHAGITIS: Chronic | ICD-10-CM

## 2025-04-15 DIAGNOSIS — J96.11 CHRONIC RESPIRATORY FAILURE WITH HYPOXIA, ON HOME O2 THERAPY: ICD-10-CM

## 2025-04-15 DIAGNOSIS — S31.829A WOUND OF LEFT BUTTOCK, INITIAL ENCOUNTER: Primary | ICD-10-CM

## 2025-04-15 DIAGNOSIS — F41.1 GENERALIZED ANXIETY DISORDER: ICD-10-CM

## 2025-04-15 DIAGNOSIS — E11.22 TYPE 2 DIABETES MELLITUS WITH CHRONIC KIDNEY DISEASE, WITH LONG-TERM CURRENT USE OF INSULIN, UNSPECIFIED CKD STAGE: ICD-10-CM

## 2025-04-15 DIAGNOSIS — Z99.81 CHRONIC RESPIRATORY FAILURE WITH HYPOXIA, ON HOME O2 THERAPY: ICD-10-CM

## 2025-04-15 DIAGNOSIS — E66.01 CLASS 2 SEVERE OBESITY DUE TO EXCESS CALORIES WITH SERIOUS COMORBIDITY AND BODY MASS INDEX (BMI) OF 37.0 TO 37.9 IN ADULT: ICD-10-CM

## 2025-04-15 DIAGNOSIS — D64.9 ANEMIA, UNSPECIFIED TYPE: ICD-10-CM

## 2025-04-15 DIAGNOSIS — I10 ESSENTIAL HYPERTENSION: Chronic | ICD-10-CM

## 2025-04-15 DIAGNOSIS — I69.354 HEMIPLEGIA AND HEMIPARESIS FOLLOWING CEREBRAL INFARCTION AFFECTING LEFT NON-DOMINANT SIDE: ICD-10-CM

## 2025-04-15 DIAGNOSIS — J96.21 ACUTE ON CHRONIC RESPIRATORY FAILURE WITH HYPOXIA: ICD-10-CM

## 2025-04-15 DIAGNOSIS — E66.812 CLASS 2 OBESITY WITH BODY MASS INDEX (BMI) OF 39.0 TO 39.9 IN ADULT, UNSPECIFIED OBESITY TYPE, UNSPECIFIED WHETHER SERIOUS COMORBIDITY PRESENT: ICD-10-CM

## 2025-04-15 DIAGNOSIS — Z79.4 TYPE 2 DIABETES MELLITUS WITH CHRONIC KIDNEY DISEASE, WITH LONG-TERM CURRENT USE OF INSULIN, UNSPECIFIED CKD STAGE: ICD-10-CM

## 2025-04-15 DIAGNOSIS — I50.42 CHRONIC COMBINED SYSTOLIC AND DIASTOLIC CONGESTIVE HEART FAILURE: ICD-10-CM

## 2025-04-15 DIAGNOSIS — J44.9 CHRONIC OBSTRUCTIVE PULMONARY DISEASE, UNSPECIFIED COPD TYPE: ICD-10-CM

## 2025-04-15 DIAGNOSIS — L30.4 INTERTRIGO: ICD-10-CM

## 2025-04-15 DIAGNOSIS — R79.9 ABNORMAL FINDING OF BLOOD CHEMISTRY: ICD-10-CM

## 2025-04-15 DIAGNOSIS — E66.812 CLASS 2 SEVERE OBESITY DUE TO EXCESS CALORIES WITH SERIOUS COMORBIDITY AND BODY MASS INDEX (BMI) OF 37.0 TO 37.9 IN ADULT: ICD-10-CM

## 2025-04-15 DIAGNOSIS — R20.9 UNSPECIFIED DISTURBANCES OF SKIN SENSATION: ICD-10-CM

## 2025-04-15 PROBLEM — J18.9 CAP (COMMUNITY ACQUIRED PNEUMONIA): Status: RESOLVED | Noted: 2021-10-01 | Resolved: 2025-04-15

## 2025-04-15 LAB
POCT GLUCOSE: 144 MG/DL (ref 70–110)
POCT GLUCOSE: 273 MG/DL (ref 70–110)
POCT GLUCOSE: 319 MG/DL (ref 70–110)

## 2025-04-15 RX ORDER — NYSTATIN 100000 U/G
CREAM TOPICAL 2 TIMES DAILY
Qty: 30 G | Refills: 1 | Status: SHIPPED | OUTPATIENT
Start: 2025-04-15

## 2025-04-15 NOTE — PROGRESS NOTES
The patient location is: [facility]  The chief complaint leading to consultation is: hospital follow-up    Visit type: audiovisual    Face to Face time with patient: 31  36 minutes of total time spent on the encounter, which includes face to face time and non-face to face time preparing to see the patient (eg, review of tests), Obtaining and/or reviewing separately obtained history, Documenting clinical information in the electronic or other health record, Independently interpreting results (not separately reported) and communicating results to the patient/family/caregiver, or Care coordination (not separately reported).         Each patient to whom he or she provides medical services by telemedicine is:  (1) informed of the relationship between the physician and patient and the respective role of any other health care provider with respect to management of the patient; and (2) notified that he or she may decline to receive medical services by telemedicine and may withdraw from such care at any time.    Notes:         [facility]    Subjective:       Patient ID: Indira Waters is a 80 y.o. female.    Chief Complaint: Hospital Follow Up      History was obtained from the patient and supplemented through chart review.  This patient is known to me.    HPI:    Patient is a 80 y.o. female w/   Past Medical History:   Diagnosis Date    Arthritis     Back pain     Cancer     ovarian    Cervical cancer     Coronary artery disease     Depression     Diabetes mellitus     Fibromyalgia     Heart attack     History of MI (myocardial infarction)     Hyperlipidemia     Hypertension     Lupus     Stroke     slight left sided weakness     Hospital follow-up for sepsis 2/2 multifocal pneumonia  Admitted   Started on ceftriazone/azithromycin  + prednisone  Had had hallucinations prior to admission, someone sitting on her bed who was not there  Already on oxygen at home  Discharged 4/9    History of Present  Illness    CHIEF COMPLAINT:  Ms. Waters presents today for hospital discharge follow-up after admission for multifocal pneumonia and COPD exacerbation.    RESPIRATORY STATUS:  She requires supplemental oxygen during physical activities including exercise, physical therapy, and bathroom ambulation. She reports SpO2 drops below 90% with these activities.    SKIN CONCERNS:  She has developed a rash in the groin and buttocks area due to Pure Wick leakage during hospitalization. She also has a wound on the left buttock medial aspect present for over a week. The wound, which was bleeding during hospitalization, has now stopped bleeding. She is currently using mupirocin, zinc oxide, and lidocaine ointment for the affected areas.    SLEEP:  She reports poor sleep last night due to buttock pain, resulting in feeling groggy and tired today.    DIABETES:  She reports improved blood sugar at home following elevated levels during hospitalization. Her most recent A1c is 7.6, showing improvement from the previous month.    LABS:  Hemoglobin is 8.33 and hematocrit 26.5, decreased from baseline hemoglobin of 10.4 in summer 2022. Ferritin was 391 in July. B12 was within normal range in summer 2022, though methylmalonic acid was elevated in 2022.      ROS:  General: +fatigue  Respiratory: +exertional dyspnea  Musculoskeletal: +nightime pain  Skin: +rash, +wound                       Lab Results   Component Value Date    HGBA1C 7.6 (H) 04/01/2025    HGBA1C 7.9 (H) 03/06/2025    HGBA1C 7.5 (H) 12/03/2024     Lab Results   Component Value Date    LDLCALC 97.4 03/06/2025    CREATININE 1.0 04/08/2025         Wt Readings from Last 15 Encounters:   04/03/25 98 kg (216 lb 0.8 oz)   03/28/25 90.7 kg (199 lb 15.3 oz)   03/27/25 89.8 kg (198 lb)   03/06/25 91 kg (200 lb 9.9 oz)   03/06/25 91.1 kg (200 lb 13.4 oz)   12/27/24 91.1 kg (200 lb 13.4 oz)   10/31/24 91.6 kg (201 lb 15.1 oz)   10/30/24 91.2 kg (201 lb)   09/19/24 93.7 kg (206 lb 9.1 oz)  "  09/03/24 93.7 kg (206 lb 9.1 oz)   08/23/24 92.1 kg (203 lb 0.7 oz)   07/25/24 89.7 kg (197 lb 12 oz)   07/19/24 87.1 kg (192 lb 0.3 oz)   07/10/24 89.6 kg (197 lb 8.5 oz)   06/26/24 91.2 kg (201 lb 1 oz)       Medical History  Past Medical History:   Diagnosis Date    Arthritis     Back pain     Cancer     ovarian    Cervical cancer     Coronary artery disease     Depression     Diabetes mellitus     Fibromyalgia     Heart attack     History of MI (myocardial infarction)     Hyperlipidemia     Hypertension     Lupus     Stroke     slight left sided weakness       Review of Systems   Constitutional:  Positive for fatigue. Negative for fever.   HENT:  Negative for congestion, rhinorrhea and sore throat.    Eyes:  Negative for pain.   Respiratory:  Positive for shortness of breath. Negative for cough.    Gastrointestinal:  Negative for diarrhea and vomiting.   Musculoskeletal:  Positive for arthralgias and myalgias.   Skin:  Positive for rash (buttocks ang roind).         Surgical hx, family hx, social hx   Have been reviewed    Current Medications[1]    Objective:        Body mass index is 40.82 kg/m².  Vitals:    04/15/25 1125   BP: (!) 123/56   Pulse: 71   Temp: 97.5 °F (36.4 °C)   SpO2: 97%   Height: 5' 1" (1.549 m)   PainSc:   8   PainLoc: Back     Physical Exam  Vitals and nursing note reviewed.   Constitutional:       General: She is not in acute distress.     Appearance: Normal appearance. She is not ill-appearing.      Comments: On nasal cannula   HENT:      Head: Normocephalic and atraumatic.   Eyes:      General: No scleral icterus.  Pulmonary:      Effort: Pulmonary effort is normal.   Genitourinary:     Comments: Buttock left medial aspect dark wound, unable to delineate more; erythema superior gluteal cleft  Musculoskeletal:         General: No deformity.   Neurological:      Mental Status: She is alert and oriented to person, place, and time.   Psychiatric:         Behavior: Behavior normal.       "           Lab Results   Component Value Date    WBC 13.28 (H) 04/08/2025    HGB 8.3 (L) 04/08/2025    HCT 26.5 (L) 04/08/2025     04/08/2025    CHOL 190 03/06/2025    TRIG 268 (H) 03/06/2025    HDL 39 (L) 03/06/2025    ALT 17 04/08/2025    AST 13 04/08/2025     04/08/2025    K 4.9 04/08/2025     04/08/2025    CREATININE 1.0 04/08/2025    BUN 32 (H) 04/08/2025    CO2 23 04/08/2025    TSH 2.059 06/05/2024    INR 1.0 06/21/2024    HGBA1C 7.6 (H) 04/01/2025       The ASCVD Risk score (Annamarie WEATHERS, et al., 2019) failed to calculate for the following reasons:    Risk score cannot be calculated because patient has a medical history suggesting prior/existing ASCVD    (Imaging have been independently reviewed)  Chest xray    Assessment:         1. Wound of left buttock, initial encounter    2. Gastroesophageal reflux disease without esophagitis    3. Type 2 diabetes mellitus with chronic kidney disease, with long-term current use of insulin, unspecified CKD stage    4. Class 2 severe obesity due to excess calories with serious comorbidity and body mass index (BMI) of 37.0 to 37.9 in adult    5. Class 2 obesity with body mass index (BMI) of 39.0 to 39.9 in adult, unspecified obesity type, unspecified whether serious comorbidity present    6. Essential hypertension    7. Chronic combined systolic and diastolic congestive heart failure    8. Chronic obstructive pulmonary disease, unspecified COPD type    9. Acute on chronic respiratory failure with hypoxia    10. Generalized anxiety disorder    11. Hemiplegia and hemiparesis following cerebral infarction affecting left non-dominant side    12. Chronic respiratory failure with hypoxia, on home O2 therapy    13. Intertrigo    14. Anemia, unspecified type    15. Abnormal finding of blood chemistry    16. Unspecified disturbances of skin sensation          Plan:     Indira was seen today for hospital follow up.    Diagnoses and all orders for this visit:    Wound of  left buttock, initial encounter  -     Ambulatory referral/consult to Wound Clinic; Future  -     HME - OTHER    Gastroesophageal reflux disease without esophagitis    Type 2 diabetes mellitus with chronic kidney disease, with long-term current use of insulin, unspecified CKD stage  -     Comprehensive Metabolic Panel; Future  -     Hemoglobin A1C; Future    Class 2 severe obesity due to excess calories with serious comorbidity and body mass index (BMI) of 37.0 to 37.9 in adult    Class 2 obesity with body mass index (BMI) of 39.0 to 39.9 in adult, unspecified obesity type, unspecified whether serious comorbidity present    Essential hypertension    Chronic combined systolic and diastolic congestive heart failure    Chronic obstructive pulmonary disease, unspecified COPD type    Acute on chronic respiratory failure with hypoxia    Generalized anxiety disorder    Hemiplegia and hemiparesis following cerebral infarction affecting left non-dominant side    Chronic respiratory failure with hypoxia, on home O2 therapy    Intertrigo  -     nystatin (MYCOSTATIN) cream; Apply topically 2 (two) times daily. Underneath Alexus's buttpaste for the rash    Anemia, unspecified type  -     Folate; Future  -     Vitamin B12; Future  -     CBC Auto Differential; Future  -     Reticulocytes; Future  -     Iron and TIBC; Future  -     Ferritin; Future  -     Lactate Dehydrogenase; Future  -     Haptoglobin; Future  -     CBC Auto Differential; Future  -     Methylmalonic Acid, Serum; Future    Abnormal finding of blood chemistry  -     Folate; Future  -     Vitamin B12; Future    Unspecified disturbances of skin sensation  -     Folate; Future  -     Vitamin B12; Future        Assessment & Plan    IMPRESSION:  1. Assessed condition post-hospital discharge for multifocal pneumonia and COPD exacerbation.  2. Evaluated wound on left buttock medial aspect and surrounding rash in groin and buttocks area.  3. Wound requires specialized  care; rash likely due to moisture exposure from Pure Wick device.  4. Considered low air loss mattress to alleviate pressure on wound area.  5. Reviewed recent lab results, noting improved A1c of 7.6 and concerning low hemoglobin of 8.33.  6. Assessed potential causes of anemia, considering slow bleed or absorption issues.  7. Noted previous normal ferritin and B12 levels, but elevated methylmalonic acid in 2022.    CHRONIC RESPIRATORY FAILURE WITH HYPOXIA:   Ms. Waters advised to continue using supplemental oxygen at home, particularly during exercise, physical therapy, and bathroom visits when SpO2 drops below 90%.   Breathing is improved compared to hospital admission but still not at baseline.   Instructed to continue using incentive spirometer and flutter valve for lung exercises.   Ordered a low air loss mattress from [employer] with a scheduled follow-up regarding the order.   Assessed oxygen usage and flow rate, noting that SpO2 drops below 90% during exertion.    PRESSURE ULCER OF BUTTOCK:   Ms. Waters to adjust position in bed or chair using cushions to reduce pressure on wound area and use cool hair dryer after showers to keep it dry.   Started mupirocin for direct application to wound and barrier cream (zinc oxide or similar) for application to surrounding area to prevent chafing.   Referred to wound care specialist for evaluation and treatment of gluteal wound.    DIAPER DERMATITIS:   Started combination treatment: Nystatin cream to be applied topically 2 times daily, followed by liberal application of Alexus's Butt Paste as an outer layer for rash (not to be applied on wound area).    ANEMIA:   Ordered comprehensive labs to evaluate anemia including CBC, iron studies, and B12 levels (non-fasting) within 1 week.   Ms. Waters has option to reschedule to coincide with wound care appointment.    DIABETES MELLITUS:   Ordered A1C, renal function tests, and CBC in 3 months.    FOLLOW-UP  ENCOUNTER:   Follow up in 3 months after completing new set of labs.   TALAT Puente to arrange wound care clinic appointment.   Ms. Waters to contact office for scheduling.             Tests to Keep You Healthy    Eye Exam: SCHEDULED      Follow up in about 3 months (around 7/15/2025). or sooner prn            This note was generated with the assistance of ambient listening technology. Verbal consent was obtained by the patient and accompanying visitor(s) for the recording of patient appointment to facilitate this note. I attest to having reviewed and edited the generated note for accuracy, though some syntax or spelling errors may persist. Please contact the author of this note for any clarification.      Visit today is associated with current or anticipated ongoing medical care related to this patient's single serious condition/complex condition of dm, COPD. The patient will return to see me as these issues will be followed longitudinally.        All medications were reviewed including potential side effects and risks/benefits.  Pt was counseled to call back if anything worsens or if questions arise.        Chun Zapata MD  Family Medicine  [facility]  [facility] [address] [address]  Phone [phone]  Fax [phone]  Answers submitted by the patient for this visit:  Rash Questionnaire (Submitted on 4/15/2025)  Chief Complaint: Rash  Chronicity: new  Onset: in the past 7 days  Progression since onset: gradually worsening  Affected locations: genitalia, right buttock  Characteristics: pain, redness, swelling  anorexia: No  facial edema: No  joint pain: No  nail changes: No  Treatments tried: anti-itch cream, antibiotic cream, moisturizer  Improvement on treatment: mild  asthma: Yes  allergies: Yes  eczema: Yes  varicella: No         [1]   Current Outpatient Medications:     acetaminophen (TYLENOL) 500 MG tablet, Take 2 tablets (1,000 mg total) by mouth every 8 (eight) hours as  "needed for Pain., Disp: , Rfl: 0    albuterol (ACCUNEB) 1.25 mg/3 mL Nebu, Take 3 mLs (1.25 mg total) by nebulization every 6 (six) hours as needed (for wheezing or shortness of breath). Rescue, Disp: 300 mL, Rfl: 11    allopurinoL (ZYLOPRIM) 300 MG tablet, Take 1 tablet (300 mg total) by mouth once daily., Disp: 90 tablet, Rfl: 3    aspirin (ECOTRIN) 81 MG EC tablet, Take 1 tablet (81 mg total) by mouth once daily., Disp: 30 tablet, Rfl: 0    BD ULTRA-FINE MICRO PEN NEEDLE 32 gauge x 1/4" Ndle, Use with insulin 5 times a day., Disp: 400 each, Rfl: 10    evolocumab (REPATHA SURECLICK) 140 mg/mL PnIj, Inject 1 mL (140 mg total) into the skin every 14 (fourteen) days., Disp: 2 each, Rfl: 11    fenofibrate micronized (LOFIBRA) 134 MG Cap, Take 1 capsule (134 mg total) by mouth daily with breakfast., Disp: 90 capsule, Rfl: 3    HYDROcodone-acetaminophen (NORCO)  mg per tablet, Take 1 tablet by mouth every 8 (eight) hours as needed for Pain., Disp: 90 tablet, Rfl: 0    insulin glargine U-100, Lantus, (LANTUS SOLOSTAR U-100 INSULIN) 100 unit/mL (3 mL) InPn pen, Inject 26 Units into the skin 2 (two) times a day., Disp: 60 mL, Rfl: 3    insulin lispro (HUMALOG KWIKPEN INSULIN) 100 unit/mL pen, Inject 15 Units into the skin before breakfast AND 15 Units daily with lunch AND 11 Units daily with dinner or evening meal. Plus correction scale. Max TDD 50units.., Disp: 40 mL, Rfl: 3    ipratropium-albuteroL (COMBIVENT RESPIMAT)  mcg/actuation inhaler, Inhale 1 puff into the lungs every 6 (six) hours., Disp: 12 g, Rfl: 3    losartan (COZAAR) 50 MG tablet, Take 1 tablet (50 mg total) by mouth once daily., Disp: 90 tablet, Rfl: 3    melatonin (MELATIN) 3 mg tablet, Take 2 tablets (6 mg total) by mouth nightly as needed for Insomnia., Disp: , Rfl:     methocarbamoL (ROBAXIN) 500 MG Tab, Take 1 tablet (500 mg total) by mouth 3 (three) times daily as needed (muscle pain)., Disp: 90 tablet, Rfl: 2    metoclopramide HCl " (REGLAN) 5 MG tablet, TAKE ONE TABLET BY MOUTH FOUR TIMES DAILY AS NEEDED FOR nausea prevention, Disp: 90 tablet, Rfl: 3    metoprolol succinate (TOPROL-XL) 100 MG 24 hr tablet, Take 1 tablet (100 mg total) by mouth once daily., Disp: 90 tablet, Rfl: 3    miconazole nitrate 2% (MICOTIN) 2 % Oint, Apply topically 2 (two) times daily. For 10 days, Disp: 142 g, Rfl: 3    NIFEdipine (PROCARDIA-XL) 30 MG (OSM) 24 hr tablet, TAKE ONE TABLET BY MOUTH TWICE DAILY, Disp: 180 tablet, Rfl: 3    nitroGLYCERIN (NITROSTAT) 0.4 MG SL tablet, DISSOLVE 1 TABLET UNDER THE TONGUE EVERY 5 MINUTES AS NEEDED FOR CHEST PAIN. DO NOT EXCEED A TOTAL OF 3 DOSES IN 15 MINUTES., Disp: 25 tablet, Rfl: 11    pregabalin (LYRICA) 150 MG capsule, Take 1 capsule (150 mg total) by mouth 2 (two) times daily., Disp: 60 capsule, Rfl: 5    senna-docusate 8.6-50 mg (PERICOLACE) 8.6-50 mg per tablet, Take 1 tablet by mouth 2 (two) times daily as needed for Constipation., Disp: 60 tablet, Rfl: 0    sertraline (ZOLOFT) 100 MG tablet, TAKE ONE TABLET BY MOUTH EVERY DAY, Disp: 90 tablet, Rfl: 3    torsemide (DEMADEX) 20 MG Tab, Take 1 tablet (20 mg total) by mouth once daily., Disp: 90 tablet, Rfl: 3    blood sugar diagnostic Strp, To check BG 6 times daily, to use with insurance preferred meter (Patient not taking: Reported on 4/10/2025), Disp: 200 each, Rfl: 11    lancets (TRUEPLUS LANCETS) 30 gauge Misc, Check blood sugar 4 times daily (Patient not taking: Reported on 4/10/2025), Disp: 400 each, Rfl: 2    nystatin (MYCOSTATIN) cream, Apply topically 2 (two) times daily. Underneath Alexus's buttpaste for the rash, Disp: 30 g, Rfl: 1

## 2025-04-15 NOTE — TELEPHONE ENCOUNTER
----- Message from [employer] sent at 4/15/2025  9:29 AM CDT -----  Name of Who is Calling:pt daughter What is the request in detail:pt daughter is requesting a call back regarding today appt 5/15/25 and if possible to change it to virtual chat..Please contact to further discuss and advise.  Can the clinic reply by [facility]:call What Number to Call Back if not in [facility]:.[phone]

## 2025-04-17 ENCOUNTER — TELEPHONE (OUTPATIENT)
Dept: INTERNAL MEDICINE | Facility: CLINIC | Age: 80
End: 2025-04-17
Payer: MEDICARE

## 2025-04-17 DIAGNOSIS — L30.4 INTERTRIGO: Primary | ICD-10-CM

## 2025-04-17 RX ORDER — NYSTATIN 100000 [USP'U]/G
POWDER TOPICAL 4 TIMES DAILY
Qty: 60 G | Refills: 2 | Status: SHIPPED | OUTPATIENT
Start: 2025-04-17

## 2025-04-17 NOTE — TELEPHONE ENCOUNTER
Spoke with Osvaldo and he said that it's his first time seeing patient and patient developed an open wound from pressure from sitting on urine incontinence. Patient was discharged on the 4/7/2025. Patient changes her diapers and goes to the restroom now that she's aware. He has set up wound care for in home and not sure how long this will take. He feels patient needs powder instead of Nystatin cream. Hard for Nepolex to stick this is why he's requesting powder to protect area from getting worse. Osvaldo said the order can be faxed to fax [phone] at [facility]

## 2025-04-17 NOTE — TELEPHONE ENCOUNTER
----- Message from [employer] sent at 4/17/2025 12:27 PM CDT -----  Type : Patient CallWho Called : Osvaldo ([facility] nurse)Does the patient know what this is regarding?: Requesting a callback regarding treating pt's injuries. Pt was discharged with these injuries and wasn't sent home with any medication to treat injuries.Says spt is needing the powder instead of cream to treat injuries.Would the patient rather a call back or a response [facility]  [phone] ( Osvaldo) Additional Information:

## 2025-04-21 ENCOUNTER — TELEPHONE (OUTPATIENT)
Dept: INTERNAL MEDICINE | Facility: CLINIC | Age: 80
End: 2025-04-21
Payer: MEDICARE

## 2025-04-21 NOTE — TELEPHONE ENCOUNTER
----- Message from Rashel Muller sent at 4/21/2025 10:22 AM CDT -----  Type:  Sooner Appointment RequestPatient is requesting a sooner appointment.  Patient declined first available appointment listed as well as another facility and provider .  Patient will not accept being placed on the waitlist and is requesting a message be sent to doctor.Name of Caller:Esha (Daughter)When is the first available appointment?8/26Symptoms:Discuss finical assistance Would the patient rather a call back or a response via [facility]?Best Call Back Number:[phone] (Mobile)Additional Information: Requesting tomorrow when coming in for lab

## 2025-04-21 NOTE — TELEPHONE ENCOUNTER
Spoke to patient daughter and she said everything has worked out for patient appointment with wound care

## 2025-04-23 ENCOUNTER — TELEPHONE (OUTPATIENT)
Dept: INTERNAL MEDICINE | Facility: CLINIC | Age: 80
End: 2025-04-23
Payer: MEDICARE

## 2025-04-23 ENCOUNTER — LAB VISIT (OUTPATIENT)
Dept: LAB | Facility: OTHER | Age: 80
End: 2025-04-23
Attending: STUDENT IN AN ORGANIZED HEALTH CARE EDUCATION/TRAINING PROGRAM
Payer: MEDICARE

## 2025-04-23 DIAGNOSIS — R79.9 ABNORMAL FINDING OF BLOOD CHEMISTRY: ICD-10-CM

## 2025-04-23 DIAGNOSIS — R20.9 UNSPECIFIED DISTURBANCES OF SKIN SENSATION: ICD-10-CM

## 2025-04-23 DIAGNOSIS — D64.9 ANEMIA, UNSPECIFIED TYPE: ICD-10-CM

## 2025-04-23 LAB
ABSOLUTE EOSINOPHIL (OHS): 0.45 K/UL
ABSOLUTE MONOCYTE (OHS): 0.72 K/UL (ref 0.3–1)
ABSOLUTE NEUTROPHIL COUNT (OHS): 6.63 K/UL (ref 1.8–7.7)
BASOPHILS # BLD AUTO: 0.05 K/UL
BASOPHILS NFR BLD AUTO: 0.5 %
ERYTHROCYTE [DISTWIDTH] IN BLOOD BY AUTOMATED COUNT: 16.3 % (ref 11.5–14.5)
FERRITIN SERPL-MCNC: 55 NG/ML (ref 20–300)
FOLATE SERPL-MCNC: 10 NG/ML (ref 4–24)
HAPTOGLOB SERPL-MCNC: 126 MG/DL (ref 30–250)
HCT VFR BLD AUTO: 27.6 % (ref 37–48.5)
HGB BLD-MCNC: 9.1 GM/DL (ref 12–16)
IMM GRANULOCYTES # BLD AUTO: 0.05 K/UL (ref 0–0.04)
IMM GRANULOCYTES NFR BLD AUTO: 0.5 % (ref 0–0.5)
IRON SATN MFR SERPL: 11 % (ref 20–50)
IRON SERPL-MCNC: 54 UG/DL (ref 30–160)
LDH SERPL-CCNC: 241 U/L (ref 110–260)
LYMPHOCYTES # BLD AUTO: 2.16 K/UL (ref 1–4.8)
MCH RBC QN AUTO: 28.7 PG (ref 27–31)
MCHC RBC AUTO-ENTMCNC: 33 G/DL (ref 32–36)
MCV RBC AUTO: 87 FL (ref 82–98)
NUCLEATED RBC (/100WBC) (OHS): 0 /100 WBC
PLATELET # BLD AUTO: 287 K/UL (ref 150–450)
PMV BLD AUTO: 11.7 FL (ref 9.2–12.9)
RBC # BLD AUTO: 3.17 M/UL (ref 4–5.4)
RELATIVE EOSINOPHIL (OHS): 4.5 %
RELATIVE LYMPHOCYTE (OHS): 21.5 % (ref 18–48)
RELATIVE MONOCYTE (OHS): 7.2 % (ref 4–15)
RELATIVE NEUTROPHIL (OHS): 65.8 % (ref 38–73)
RETICS/RBC NFR AUTO: 3.4 % (ref 0.5–2.5)
TIBC SERPL-MCNC: 505 UG/DL (ref 250–450)
TRANSFERRIN SERPL-MCNC: 341 MG/DL (ref 200–375)
VIT B12 SERPL-MCNC: 804 PG/ML (ref 210–950)
WBC # BLD AUTO: 10.06 K/UL (ref 3.9–12.7)

## 2025-04-23 PROCEDURE — 83921 ORGANIC ACID SINGLE QUANT: CPT

## 2025-04-23 PROCEDURE — 82746 ASSAY OF FOLIC ACID SERUM: CPT

## 2025-04-23 PROCEDURE — 83615 LACTATE (LD) (LDH) ENZYME: CPT

## 2025-04-23 PROCEDURE — 85025 COMPLETE CBC W/AUTO DIFF WBC: CPT

## 2025-04-23 PROCEDURE — 82728 ASSAY OF FERRITIN: CPT

## 2025-04-23 PROCEDURE — 36415 COLL VENOUS BLD VENIPUNCTURE: CPT

## 2025-04-23 PROCEDURE — 85045 AUTOMATED RETICULOCYTE COUNT: CPT

## 2025-04-23 PROCEDURE — 82607 VITAMIN B-12: CPT

## 2025-04-23 PROCEDURE — 83540 ASSAY OF IRON: CPT

## 2025-04-23 PROCEDURE — 83010 ASSAY OF HAPTOGLOBIN QUANT: CPT

## 2025-04-23 NOTE — TELEPHONE ENCOUNTER
Informed patient daughter that I just faxed the powder prescription to [facility].

## 2025-04-23 NOTE — TELEPHONE ENCOUNTER
----- Message from Yvan sent at 4/23/2025 11:43 AM CDT -----  Name of Who is Calling: Esha  What is the request in detail:pt daughter calling asking if the nystatin (MYCOSTATIN) powder can be sent to  [facility].Please call back to further assist.   [facility] - [address] the clinic reply by [facility]: No  What Number to Call Back if not in [facility]: [phone]

## 2025-04-25 ENCOUNTER — PATIENT MESSAGE (OUTPATIENT)
Dept: DIABETES | Facility: CLINIC | Age: 80
End: 2025-04-25

## 2025-04-25 ENCOUNTER — CLINICAL SUPPORT (OUTPATIENT)
Dept: DIABETES | Facility: CLINIC | Age: 80
End: 2025-04-25
Payer: MEDICARE

## 2025-04-25 DIAGNOSIS — E11.65 TYPE 2 DIABETES MELLITUS WITH HYPERGLYCEMIA, WITH LONG-TERM CURRENT USE OF INSULIN: Primary | ICD-10-CM

## 2025-04-25 DIAGNOSIS — Z79.4 TYPE 2 DIABETES MELLITUS WITH HYPERGLYCEMIA, WITH LONG-TERM CURRENT USE OF INSULIN: Primary | ICD-10-CM

## 2025-04-25 NOTE — LETTER
April 25, 2025      Giuliana Montero NP  [address]  [facility] [address]         Patient: CAYLA Waters   MR Number: [id number]   YOB: 1945   Date of Visit: 4/25/2025       Dear Giuliana Montero:    Thank you for referring CAYLA for diabetes self-management education and support services. She has completed all components of our Diabetes Management Program. Below is a summary of her clinical outcomes and goal progress.    Patient Outcomes:    A1c Status:   Lab Results   Component Value Date    HGBA1C 7.6 (H) 04/01/2025    HGBA1C 7.9 (H) 03/06/2025    HGBA1C 7.5 (H) 12/03/2024       Care Plan: Diabetes Management   Updates made since 4/25/2024 12:00 AM        Problem: Medications         Goal: Patient Agrees to take Diabetes Medication(s) MDI as prescribed. Completed 4/25/2025   Start Date: 8/6/2024   Expected End Date: 9/12/2024   Recent Progress: Met   Priority: High   Barriers: No Barriers Identified   Note:    8/6/24 - Dtr (Esha) is assisting with pt's insulin administration. Confirms she does not miss a dose and is consistently taking at appropriate times. Observed injection sites (prefers abdomen - less painful) - some bruising but minimal and does not appear to have much scar tissue. Appropriately rotating sites. She is taking more than currently prescribed - taking Levemir 20 in am and 24 in pm and taking Humalog 11 AC + scale starting at 150-200 +2 units. Overall, glucose remaining well above goal (see attached logs). Educated on insulin titration guidelines and dtr showed good understanding. She has not seen endocrinology since late last year - lost to follow-up - Pt is interested in getting back in with Giuliana. Sent message to Giuliana Montero NP staff to assist with scheduling.    3/27/25 - Dtr is assisting with administering insulin injections and consistently providing MDI as prescribed and appropriately timing prandial insulin 5-15 min before meals. Reviewed Dexcom report with with  "pt and dtr. TIR 40% and Avg glucose 198. Lows have been mostly resolved. Will share report with Giuliana Montero NP to see if further insulin dose adjustment warranted. Noticed big variations from day to day - some of this r/t food choices and occasionally "splurges" and active/inactive days but also having more pain some days vs others. Briefly discussed pump therapy could be a good tool to help with getting ahead of highs/lows - pt would not be able to use pump on her own but I'm confident dtr would be able to manage it (and dtr is with her 24/7). Briefly explained what pump therapy is, training/tech involved, and potential cost. Pt is currently using Dexcom with  and phone is not compatible with demarcus - discussed would not be able to use both  and pump at the same time. Omnipod particularly would not be an option unless she got a phone that is compatible with the Dexcom demarcus. Dtr and pt will think about it.     4/25/25 - Approx 2 weeks s/p hospitalization. Readings in hospital were much higher but pt was on steroids. Overall, home readings greatly improved. Doing well with taking MDI consistently and timing appropriately. Dtr has decreased Humalog doses d/t lows. Reported readings majority in goal - reports most readings 80s-120s. Today 170 ~2hr after lunch. Occasionally has higher PP spike depending on meal composition (spiked to 250 after pizza).       Task: Reviewed with patient all current diabetes medications and provided basic review of the purpose, dosage, frequency, side effects, and storage of both oral and injectable diabetes medications. Completed 8/13/2024        Task: Discussed guidelines for preventing, detecting and treating hypoglycemia and hyperglycemia and reviewed the importance of meal and medication timing with diabetes mediations for prevention of hypoglycemia and maximum drug benefit. Completed 8/13/2024        Problem: Blood Glucose Self-Monitoring         Goal: Pt will check " affordability of personal CGM. Completed 4/25/2025   Start Date: 8/6/2024   Expected End Date: 9/12/2024   Recent Progress: Met   Priority: Medium   Barriers: No Barriers Identified   Note:    8/6/24 - Pt is interested in personal CGM but has not been able to get it. Suspect it is d/t insurance - she has traditional Medicare A & B and [place] Medicaid, which requires going through DME. Explained process with pt and dtr and what to expect. Emphasized she will receive a call from DME regarding the order and must answer/call back before they will ship to her home. Dr. Zapata signed order for Dexcom G7 and sent to [facility].     3/27/25 - Using Dexcom G7 and loves it! They report it has been a game changer for her. Had an issue with a faulty sensor that was reading consistently low all morning, fingerstick reading confirmed large discrepancy. Dtr went ahead and changed sensor early and denies having any issues since. Reviewed calibrating Dexcom, which she had tried but didn't fix discrepancy. Reviewed contacting Dexcom to get replacement for faulty sensor.        Task: Reviewed the importance of self-monitoring blood glucose and keeping logs. Completed 8/13/2024        Problem: Healthy Eating         Goal: Will add protein with breakfast.    Start Date: 8/6/2024   Expected End Date: 9/12/2024   Recent Progress: Met   Priority: Low   Barriers: No Barriers Identified   Note:    8/6/24 - Dtr verbalized has done a lot of her own research on dm diet and received a lot of information from dietitian at rehab facility. She reports looking at glycemic index of foods and trying to really limit carbs. Pt does eat cream of wheat every morning - loves her cream of wheat and this one is important to her. Lunch is usually a sandwich on keto bread. Dinner is lightest carb meal - usually protein and non-starchy vegetables only. Does have some lower GI fruit for a snack and sometimes a spoonful of pb. Discussed does not need to avoid carbs  "all together but rather focus on portion sizes, choosing high fiber carbs, and balancing meals with protein. Encouraged adding a protein source to breakfast - likes eggs and willing to add to breakfast. Also discussed having lower HS readings because of very low carb dinner - encouraged adding a high fiber carb to dinner (beans, whole grain, berries, etc). Encouraged if BG <100 HS, to have carb + protein snack before bed. She likes 1 gopal cracker plank + pb for an HS snack.     3/27/25 - Has added protein to breakfast. Having variability from day to day some r/t food choices. Overall, they are balancing home prepared meals with protein, carb, and non-starchy vegetables. She is incorporating more non-starchy veg and controlling carb portion as previously discussed. Avoiding sugary drinks (only uses regular drink for treating low). Occasionally eats a "cheat" meal - burger and fries last night - has a "splurge" maybe once a month. Discussed reducing carb intake when "cheating" - if going to have a burger, skip fries,  using IMANIN demarcus to find lower carb menu choices.     4/25/25 - Developed buttock wound in hospital and reports slow to heal. She reports good appetite and eating protein with every meal. Dtr reports they have been eating balanced meals more and more. Uses keto bread and limits carb portions, eating more non-starch vegetables, and incorporating lean proteins. Occasionally has a splurge like pizza or burger but not often (maybe once a week). Reinforced proteins first at meals and reviewed getting enough protein is extra important along with good glycemic control to aid in wound healing. She is taking a multivitamin as well - confirmed contains vitamin C and zinc. Discussed can consider adding Negrito 1 pkt BID mixed with water or zero calorie drink to also aid in wound healing; however, can be pricey. Dtr plans to look into it.         Task: Reviewed the sources and role of Carbohydrate, Protein, " and Fat and how each nutrient impacts blood sugar. Completed 8/13/2024        Task: Provided visual examples using dry measuring cups, food models, and other familiar objects such as computer mouse, deck or cards, tennis ball etc. to help with visualization of portions. Completed 8/13/2024        Task: Discussed strategies for choosing healthier menu options when dining out. Completed 3/28/2025        Task: Review the importance of balancing carbohydrates with each meal using portion control techniques to count servings of carbohydrate and label reading to identify serving size and amount of total carbs per serving. Completed 8/13/2024        Task: Provided Sample plate method and reviewed the use of the plate to estimate amounts of carbohydrate per meal. Completed 3/28/2025          Follow up:   CAYLA to attend medical appointments as scheduled  CAYLA to update you on her DM education progress as needed      If you have questions, please do not hesitate to call me. I look forward to providing additional education and support as needed.    Sincerely,    Elena Villatoro RD, [facility]  Diabetes Care and

## 2025-04-25 NOTE — PROGRESS NOTES
Diabetes Care Specialist Virtual Visit Note       The patient location is: home In [place]  The chief complaint leading to consultation is: Diabetes  Visit type: audiovisual  Total time spent with patient: 30 min   Each patient to whom he or she provides medical services by telemedicine is:  (1) informed of the relationship between the physician and patient and the respective role of any other health care provider with respect to management of the patient; and (2) notified that he or she may decline to receive medical services by telemedicine and may withdraw from such care at any time.     Diabetes Care Specialist Progress Note  Author: Elena Villatoro RD, [facility]  Date: 4/25/2025    Intake    Program Intake  Reason for Diabetes Program Visit:: Post Program Follow-Up  Type of Intervention:: Individual  Individual: Education  Education: Self-Management Skill Review  Type of Follow-Up: Other  Current diabetes risk level:: moderate  In the last month, have you used the ER or been admitted to the hospital: Yes (3/31 - hospitalization for Pneumonia, COPD, CHF)  Was the ER or hospital admission related to diabetes?: No  Permission to speak with others about care:: yes    Current Diabetes Treatment: Insulin  Method of insulin delivery?: Injections  Injection Type: Pens  Pen Type/Dose: Lantus 26 units BID, Humalog 0-5 units + scale 200-250 +1, 251-300 +, >/=301 +3 (self-decreased Humalog d/t lows)    Continuous Glucose Monitoring  Patient has CGM: Yes  Personal CGM type:: Dexcom G7 - unable to view download today d/t virtual visit and pt only able to use  (phone not compatible)    Lab Results   Component Value Date    HGBA1C 7.6 (H) 04/01/2025       Today's interventions were provided through individual discussion, instruction, and written materials were provided.      Patient verbalized understanding of instruction and written materials.  Pt was able to return back demonstration of instructions today. Patient  "understood key points, needs reinforcement and further instruction.     Diabetes Self-Management Care Plan:    Today's Diabetes Self-Management Care Plan was developed with Indira's input. Indira has agreed to work toward the following goal(s) to improve his/her overall diabetes control.      Care Plan: Diabetes Management   Updates made since 4/25/2024 12:00 AM        Problem: Medications         Goal: Patient Agrees to take Diabetes Medication(s) MDI as prescribed. Completed 4/25/2025   Start Date: 8/6/2024   Expected End Date: 9/12/2024   Recent Progress: Met   Priority: High   Barriers: No Barriers Identified   Note:    8/6/24 - Dtr (Esha) is assisting with pt's insulin administration. Confirms she does not miss a dose and is consistently taking at appropriate times. Observed injection sites (prefers abdomen - less painful) - some bruising but minimal and does not appear to have much scar tissue. Appropriately rotating sites. She is taking more than currently prescribed - taking Levemir 20 in am and 24 in pm and taking Humalog 11 AC + scale starting at 150-200 +2 units. Overall, glucose remaining well above goal (see attached logs). Educated on insulin titration guidelines and dtr showed good understanding. She has not seen endocrinology since late last year - lost to follow-up - Pt is interested in getting back in with Giuliana. Sent message to Giuliana Montero NP staff to assist with scheduling.    3/27/25 - Dtr is assisting with administering insulin injections and consistently providing MDI as prescribed and appropriately timing prandial insulin 5-15 min before meals. Reviewed Dexcom report with with pt and dtr. TIR 40% and Avg glucose 198. Lows have been mostly resolved. Will share report with Giuliana Montero NP to see if further insulin dose adjustment warranted. Noticed big variations from day to day - some of this r/t food choices and occasionally "splurges" and active/inactive days but also having more pain " some days vs others. Briefly discussed pump therapy could be a good tool to help with getting ahead of highs/lows - pt would not be able to use pump on her own but I'm confident dtr would be able to manage it (and dtr is with her 24/7). Briefly explained what pump therapy is, training/tech involved, and potential cost. Pt is currently using Dexcom with  and phone is not compatible with demarcus - discussed would not be able to use both  and pump at the same time. Omnipod particularly would not be an option unless she got a phone that is compatible with the Dexcom demarcus. Dtr and pt will think about it.     4/25/25 - Approx 2 weeks s/p hospitalization. Readings in hospital were much higher but pt was on steroids. Overall, home readings greatly improved. Doing well with taking MDI consistently and timing appropriately. Dtr has decreased Humalog doses d/t lows. Reported readings majority in goal - reports most readings 80s-120s. Today 170 ~2hr after lunch. Occasionally has higher PP spike depending on meal composition (spiked to 250 after pizza).    Missed endocrine follow-up with Giuliana Montero NP d/t hospitalization. Sent message to Giuliana's staff to assist with re-scheduling.        Task: Reviewed with patient all current diabetes medications and provided basic review of the purpose, dosage, frequency, side effects, and storage of both oral and injectable diabetes medications. Completed 8/13/2024        Task: Discussed guidelines for preventing, detecting and treating hypoglycemia and hyperglycemia and reviewed the importance of meal and medication timing with diabetes mediations for prevention of hypoglycemia and maximum drug benefit. Completed 8/13/2024        Problem: Blood Glucose Self-Monitoring         Goal: Pt will check affordability of personal CGM. Completed 4/25/2025   Start Date: 8/6/2024   Expected End Date: 9/12/2024   Recent Progress: Met   Priority: Medium   Barriers: No Barriers Identified    Note:    8/6/24 - Pt is interested in personal CGM but has not been able to get it. Suspect it is d/t insurance - she has traditional Medicare A & B and [place] Medicaid, which requires going through DME. Explained process with pt and dtr and what to expect. Emphasized she will receive a call from DME regarding the order and must answer/call back before they will ship to her home. Dr. Zapata signed order for Dexcom G7 and sent to [facility].     3/27/25 - Using Dexcom G7 and loves it! They report it has been a game changer for her. Had an issue with a faulty sensor that was reading consistently low all morning, fingerstick reading confirmed large discrepancy. Dtr went ahead and changed sensor early and denies having any issues since. Reviewed calibrating Dexcom, which she had tried but didn't fix discrepancy. Reviewed contacting Dexcom to get replacement for faulty sensor.        Task: Reviewed the importance of self-monitoring blood glucose and keeping logs. Completed 8/13/2024        Problem: Healthy Eating         Goal: Will add protein with breakfast.    Start Date: 8/6/2024   Expected End Date: 9/12/2024   Recent Progress: Met   Priority: Low   Barriers: No Barriers Identified   Note:    8/6/24 - Dtr verbalized has done a lot of her own research on dm diet and received a lot of information from dietitian at rehab facility. She reports looking at glycemic index of foods and trying to really limit carbs. Pt does eat cream of wheat every morning - loves her cream of wheat and this one is important to her. Lunch is usually a sandwich on keto bread. Dinner is lightest carb meal - usually protein and non-starchy vegetables only. Does have some lower GI fruit for a snack and sometimes a spoonful of pb. Discussed does not need to avoid carbs all together but rather focus on portion sizes, choosing high fiber carbs, and balancing meals with protein. Encouraged adding a protein source to breakfast - likes eggs and willing  "to add to breakfast. Also discussed having lower HS readings because of very low carb dinner - encouraged adding a high fiber carb to dinner (beans, whole grain, berries, etc). Encouraged if BG <100 HS, to have carb + protein snack before bed. She likes 1 gopal cracker plank + pb for an HS snack.     3/27/25 - Has added protein to breakfast. Having variability from day to day some r/t food choices. Overall, they are balancing home prepared meals with protein, carb, and non-starchy vegetables. She is incorporating more non-starchy veg and controlling carb portion as previously discussed. Avoiding sugary drinks (only uses regular drink for treating low). Occasionally eats a "cheat" meal - burger and fries last night - has a "splurge" maybe once a month. Discussed reducing carb intake when "cheating" - if going to have a burger, skip fries,  using Muzy demarcus to find lower carb menu choices.     4/25/25 - Developed buttock wound in hospital and reports slow to heal. She reports good appetite and eating protein with every meal. Dtr reports they have been eating balanced meals more and more. Uses keto bread and limits carb portions, eating more non-starch vegetables, and incorporating lean proteins. Occasionally has a splurge like pizza or burger but not often (maybe once a week). Reinforced proteins first at meals and reviewed getting enough protein is extra important along with good glycemic control to aid in wound healing. She is taking a multivitamin as well - confirmed contains vitamin C and zinc. Discussed can consider adding Negrito 1 pkt BID mixed with water or zero calorie drink to also aid in wound healing; however, can be pricey. Dtr plans to look into it.         Task: Reviewed the sources and role of Carbohydrate, Protein, and Fat and how each nutrient impacts blood sugar. Completed 8/13/2024        Task: Provided visual examples using dry measuring cups, food models, and other familiar objects such as " computer mouse, deck or cards, tennis ball etc. to help with visualization of portions. Completed 8/13/2024        Task: Discussed strategies for choosing healthier menu options when dining out. Completed 3/28/2025        Task: Review the importance of balancing carbohydrates with each meal using portion control techniques to count servings of carbohydrate and label reading to identify serving size and amount of total carbs per serving. Completed 8/13/2024        Task: Provided Sample plate method and reviewed the use of the plate to estimate amounts of carbohydrate per meal. Completed 3/28/2025          Follow Up Plan     Follow up annually or sooner if A1C increases.    Today's care plan and follow up schedule was discussed with patient.  Indira verbalized understanding of the care plan, goals, and agrees to follow up plan.        The patient was encouraged to communicate with his/her health care provider/physician and care team regarding his/her condition(s) and treatment.  I provided the patient with my contact information today and encouraged to contact me via phone or [facility] Patient Portal as needed.     Length of Visit   Total Time: 30 Minutes

## 2025-04-28 ENCOUNTER — RESULTS FOLLOW-UP (OUTPATIENT)
Dept: INTERNAL MEDICINE | Facility: CLINIC | Age: 80
End: 2025-04-28
Payer: MEDICARE

## 2025-04-28 ENCOUNTER — PATIENT MESSAGE (OUTPATIENT)
Dept: ENDOCRINOLOGY | Facility: CLINIC | Age: 80
End: 2025-04-28
Payer: MEDICARE

## 2025-04-28 ENCOUNTER — OUTPATIENT CASE MANAGEMENT (OUTPATIENT)
Dept: ADMINISTRATIVE | Facility: OTHER | Age: 80
End: 2025-04-28
Payer: MEDICARE

## 2025-04-28 DIAGNOSIS — D64.9 ANEMIA, UNSPECIFIED TYPE: Primary | ICD-10-CM

## 2025-04-28 NOTE — LETTER
Indira Waters  [address]   [place] [facility] [address]      Dear Indira,    Welcome to [facility]s Complex Care Management Program.  It was a pleasure talking with you today.  My name is Paola Malhotra, and I look forward to being your Care Manager.  My goal is to help you function at the healthiest and highest level possible.  You can contact me directly at [phone].    As an [facility] patient, some of the services we may be able to provide include:     Development of an individualized care plan with a Registered Nurse   Connection with a   Connection with available resources and services    Coordinate communication among your care team members   Provide coaching and education   Help you understand your doctors treatment plan  Help you obtain information about your insurance coverage.     All services provided by [facility]s Complex Care Managers and other care team members are coordinated with and communicated to your primary care team.      As part of your enrollment, you will be receiving education materials and more information about these services in your [facility] account, by phone or through the mail.  If you do not wish to participate or receive information, please contact our office at [phone].      [facility] Patient Rights and Responsibilities available upon request.    Sincerely,    Paola Malhotra RN  [facility]   Outpatient RN Complex Care Manager                                  [facility]:  Paola Malhotra RN - [facility] Outpatient Care Management Nurse  Telephone:  [phone],  Mon-Fri 8 AM-4:30 PM    Korina Azevedo LMSW - [facility]patient Care Management   Telephone:  [phone],  Mon-Fri 8 AM-4:30 PMStarla Garza formerly [facility] Health Worker - [facility]patient Care Management  Telephone:  [phone],  Mon-Fri 8 AM-4:30 PM    [facility] On Call,  24/7 Nurse Help Line (non emergent) - Telephone:  [phone]    My [facility] Technical  Issue Support Team - Telephone:  [phone], Mon-Fri 9 AM-5 PM    [email or web address] - Online patient portal    [facility] Financial Assistance - Telephone:  [phone], Mon-Thurs 7:30 AM-6 PM and Fri 8 AM-4:30 PM

## 2025-04-28 NOTE — PROGRESS NOTES
Outpatient Care Management  Initial Patient Assessment    Patient: Indira Waters  MRN: [id number]  Date of Service: 04/28/2025  Completed by: Madina Malhotra RN  Referral Date: 03/31/2025  Date of Eligibility: 4/1/2025  Program:   High Risk  Status: Ongoing  Effective Dates: 4/28/2025 - present  Responsible Staff: Madina Malhotra RN        Reason for Visit   Patient presents with    OPCM Enrollment Call    Nursing Assessment       Brief Summary:  Indira Waters was referred by Dr. Juanis Dubois for Community acquired pneumonia, chronic combined systolic and diastolic CHF, COPD. Patient qualifies for program based on risk score of 70.2%.   Active problem list, medical, surgical and social history reviewed. Active comorbidities include CHF, CAD, HTN, HLD, DM2, diabetic polyneuropathy, COPD, CKD3, Anemia, CVA, GERD, fibromyalgia, OA of knees, MDD, AD. Areas of need identified by patient's daughter, Esha, include CKD 3 and Anemia.       CM sent referral to Korina Azevedo [facility].  Patient is in need of assistance with transportation, COA information, assistance with changing her address with social security (she has been having great difficulty with this), and an SDOH.     Next steps:   Follow up if received educational materials  Educate on what CKD is  Educate on S&S of CKD  Educate on what Anemia is  Educate on S&S of Anemia        Disability Status  Is the patient alert and oriented (person, place, time, and situation)?: Alert and oriented x 4  Hearing Difficulty or Deaf: no  Visual Difficulty or Blind: yes  Visual and Hearing Conclusion Statement: no hearing problems, wears glasses all the time.  Vision Management: Other  Difficulty Concentrating, Remembering or Making Decisions: no  Communication Difficulty: no  Eating/Swallowing Difficulty: no  Walking or Climbing Stairs Difficulty: yes  Walking or Climbing Stairs: stair climbing difficulty, requires equipment; ambulation difficulty, requires  equipment  Mobility Management: ambulates with a cane and climbs stairs using a railing  Dressing/Bathing Difficulty: yes  Dressing/Bathing: bathing difficulty, assistance 1 person; dressing difficulty, assistance 1 person  Dressing/Bathing Management: only needs assistance of one person to get in and out of tub and someone to tie her shoes.  Grooming: independent  Transferring (e.g., getting in and out of chairs): independent  Toileting : Independent  Continence : Incontience - Bladder  Difficulty Managing Errands Independently: yes  Errands Management: daughters run errands for her  Equipment Currently Used at Home: rollator; wheelchair; shower chair; oxygen; bedside commode; glucometer; blood pressure machine; other (see comments); cane, straight (Dexcom G7, scale)  ADL Conclusion Statement: Patient is fairly independent with ADL's and her daughter assists her with whatever she needs.  They live in a second floor apartment with no elevator.  She does okay going up stairs with a railing and a cane.  She has no hearing problems and needs new glasses but the doctor will not prescribe them because she needs to have her cataracts removed.  The patient occasionally cooks but otherwise, the daughter, Esha, takes care of errands, household chores and the rest of the cooking.  Service Animal Required: no  Change in Functional Status Since Onset of Current Illness/Injury: no        Spiritual Beliefs  Spiritual, Cultural Beliefs, Mu-ism Practices, Values that Affect Care: no      Social History     Socioeconomic History    Marital status:    Tobacco Use    Smoking status: Former     Current packs/day: 0.00     Types: Cigarettes     Quit date: 2020     Years since quittin.4     Passive exposure: Past    Smokeless tobacco: Never   Substance and Sexual Activity    Alcohol use: Not Currently     Comment: occasionally    Drug use: Yes     Types: Hydrocodone     Comment: three times a day    Sexual activity:  Not Currently   Social History Narrative    Lives with daughter. .      Social Drivers of Health     Financial Resource Strain: Low Risk  (4/2/2025)    Overall Financial Resource Strain (CARDIA)     Difficulty of Paying Living Expenses: Not hard at all   Food Insecurity: No Food Insecurity (4/2/2025)    Hunger Vital Sign     Worried About Running Out of Food in the Last Year: Never true     Ran Out of Food in the Last Year: Never true   Transportation Needs: No Transportation Needs (4/1/2025)    PRAPARE - Transportation     Lack of Transportation (Medical): No     Lack of Transportation (Non-Medical): No   Physical Activity: Inactive (4/1/2025)    Exercise Vital Sign     Days of Exercise per Week: 0 days     Minutes of Exercise per Session: 0 min   Stress: No Stress Concern Present (4/1/2025)    [employer] - Occupational Stress Questionnaire     Feeling of Stress : Not at all   Housing Stability: Low Risk  (4/2/2025)    Housing Stability Vital Sign     Unable to Pay for Housing in the Last Year: No     Number of Times Moved in the Last Year: 0     Homeless in the Last Year: No       Roles and Relationships  Primary Source of Support/Comfort: child(georges)  Name of Support/Comfort Primary Source: daughterEsha  Secondary Source of Support/Comfort: child(georges); friend  Name of Support/Comfort Secondary Source: Friend, Jazzmine      Advance Directives (For Healthcare)  Advance Directive  (If Adv Dir status is received, view document under Adv Dir in header or Chart Review Media tab): Patient does not have Advance Directive, requests information.  Patient Requests Assistance: Handouts provided        Patient Reported Insurance  Verified current insurance plan:: Medicare            4/28/2025     4:09 PM 4/15/2025    11:27 AM 3/28/2025     2:04 PM 3/6/2025     1:06 PM 12/27/2024     1:43 PM 10/31/2024    10:54 AM 9/24/2024     1:13 PM   Depression Patient Health Questionnaire   Over the  last two weeks how often have you been bothered by little interest or pleasure in doing things Not at all Not at all Not at all Not at all Not at all Not at all Not at all   Over the last two weeks how often have you been bothered by feeling down, depressed or hopeless Not at all Not at all Not at all Not at all Not at all Not at all Not at all   PHQ-2 Total Score 0 0 0 0 0 0 0       Learning Assessment       04/28/2025 1647 [facility] (4/28/2025 - Present)   Created by Madina Malhotra RN -  (Nurse) Status: Complete                 PRIMARY LEARNER     Primary Learner Name:  Indira Waters DB - 04/28/2025 1647    Relationship:  Patient DB - 04/28/2025 1647    Does the primary learner have any barriers to learning?:  No Barriers DB - 04/28/2025 1647    What is the preferred language of the primary learner?:  English DB - 04/28/2025 1647    Is an  required?:  No DB - 04/28/2025 1647    How does the primary learner prefer to learn new concepts?:  Listening, Reading DB - 04/28/2025 1647    How often do you need to have someone help you read instructions, pamphlets, or written material from your doctor or pharmacy?:  Never DB - 04/28/2025 1647        CO-LEARNER #1     Co-Learner Name (if applicable):  Esha Severino, daughter DB - 04/28/2025 1647    Relationship:  Family DB - 04/28/2025 1647    Does the co-learner have any barriers to learning?:  No Barriers DB - 04/28/2025 1647    What is the preferred language of the co-learner?:  English DB - 04/28/2025 1647    Is an  required?:  No DB - 04/28/2025 1647    How does the co-learner prefer to learn new concepts?:  Listening, Reading DB - 04/28/2025 1647        CO-LEARNER #2     No question answered        SPECIAL TOPICS     No question answered        ANSWERED BY:     No question answered        Comments         Edit History       Madina Malhotra, RN -  (Nurse)   04/28/2025 1647

## 2025-04-29 ENCOUNTER — TELEPHONE (OUTPATIENT)
Dept: INTERNAL MEDICINE | Facility: CLINIC | Age: 80
End: 2025-04-29
Payer: MEDICARE

## 2025-04-29 DIAGNOSIS — E11.40 PAINFUL DIABETIC NEUROPATHY: ICD-10-CM

## 2025-04-29 DIAGNOSIS — M17.0 PRIMARY OSTEOARTHRITIS OF BOTH KNEES: ICD-10-CM

## 2025-04-29 DIAGNOSIS — M54.16 LUMBAR RADICULOPATHY, CHRONIC: ICD-10-CM

## 2025-04-29 DIAGNOSIS — G89.4 CHRONIC PAIN DISORDER: ICD-10-CM

## 2025-04-29 LAB — W METHYLMALONIC ACID: 0.55 UMOL/L

## 2025-04-29 RX ORDER — HYDROCODONE BITARTRATE AND ACETAMINOPHEN 10; 325 MG/1; MG/1
1 TABLET ORAL EVERY 8 HOURS PRN
COMMUNITY
End: 2025-04-30

## 2025-04-29 NOTE — TELEPHONE ENCOUNTER
----- Message from  Madina sent at 4/28/2025  5:21 PM CDT -----  Regarding: Enroll in OPCM  4/28/2025 Good evening,In addition to letting you know that she is enrolled in OPCM, I wanted to ask if you could please add Hydrocodone 10/325 on tablet every 8 hours as needed for pain to her med list.  For some reason it was removed and she has been on it a while from her pain medicine doctor, Dr. Pereira.  The daughter told me it was never stopped at anytime.   Thank you!Your patient [id number] Indira Waters  was  referred to [facility] by Provider Referral.  My name is Paola, Care Manager.  At times, it may be necessary to have a  involved in the coordination of care, their names will be added to the Care Team where appropriate.In our enrollment encounter, the following needs were identified:Transportation, Disease Management Education, Self-care Management Education, and Caregiver SupportAll needs and services provided by [facility] Man[facility] and other care team members will be communicated to you via your Resource Pool.  Our documentation can be readily accessed in the EMR using the following tabs: Chart review -> Episodes: High Risk. It is our goal to provide holistic care, if at any time you feel other areas of concern need to be addressed, please communicate with me by Inbasket messaging.  Sincerely,Paola Malhotra RNRN Case ManagerOutpatient Complex Care ManagementOchsner Health [email or web address]

## 2025-04-30 ENCOUNTER — PATIENT MESSAGE (OUTPATIENT)
Dept: PAIN MEDICINE | Facility: CLINIC | Age: 80
End: 2025-04-30
Payer: MEDICARE

## 2025-05-05 ENCOUNTER — OUTPATIENT CASE MANAGEMENT (OUTPATIENT)
Dept: ADMINISTRATIVE | Facility: OTHER | Age: 80
End: 2025-05-05
Payer: MEDICARE

## 2025-05-05 NOTE — LETTER
Indira Waters  [address]   [place] [address]      Dear Indira Waters,     I am your nurse with [facility]. I was unsuccessful in reaching you today. At your earliest convenience, I would like to discuss your healthcare progress.      Please contact me at [phone] from 8:00AM to 4:30 PM on Monday thru Friday.     As you know, [facility] is a program offered to you through [facility] where a nurse is available 24/7 to answer questions or provide medical advice, their number is [phone].    Thanks,    Paola Malhotra, RN  Outpatient Care Management

## 2025-05-09 ENCOUNTER — OUTPATIENT CASE MANAGEMENT (OUTPATIENT)
Dept: ADMINISTRATIVE | Facility: OTHER | Age: 80
End: 2025-05-09
Payer: MEDICARE

## 2025-05-09 NOTE — LETTER
Indira Waters  [address]   [place] [address]      Dear Indira Waters,     I am writing from the Outpatient Care Management Department at [facility]. I received a referral from Madina Malhotra RN to contact you regarding any needs you may have. I was unable to reach you by phone. Please contact me for assistance.     I can be reached at [phone] Monday thru Friday from 8:00am to 4:30pm.      Additionally, [facility] also has a program with a nurse available 24/7 to answer questions or provide medical advice. [facility] can be reached at [phone].      Sincerely,        Naz Mclain LMSW  Outpatient Care Management

## 2025-05-16 NOTE — PROGRESS NOTES
Outpatient Care Management   - Patient Assessment    Patient: Indira Waters  MRN:  [id number]  Date of Service:  5/16/2025  Completed by:  Naz Mclain LMSW  Referral Date: 03/31/2025    Reason for Visit   Patient presents with    Other     5/9/25:1st attempt to complete Initial Assessment  for OPCM,unable to leave message. Attempt letter sent.Collaborating w/ OPCM RN for successful patient contact.   5/12/25:2nd attempt to complete Initial Assessment  for OPCM, left message. Collaborating w/OPCM RN for successful pt contact.   5/14/25:3rd attempt to complete Initial Assessment  for OPCM, left message. Collaborating w/ OPCM RN for successful pt contact. Spoke w/ pt's daughter who would pass on Sw'ers number to pt and have her call sw.       Other Misc     05/15/2025: OPCM RN unable to make contact w/ patient.   5/16/2025  4th attempt to complete Initial Assessment  for OPCM, left message. RN agreeable to case closure.       This OPCM  is closing encounter with patient after 4 failed intiial attempts (  5/9,5/12,5/14,5/16     ) and a letter mailed on  5/9  to patient. OPCM maintained communication with RN  for assistance with connecting sw to patient (unsuccessful). RN attempted to connect SW w/ pt on 5/15 w/out success. OPCM  will close case. Paola Malhotra RN notified of case closure and is in agreement to sw closing case. RN agreeable to provide patient w/ SW number to reach out SW.

## 2025-05-18 DIAGNOSIS — G89.4 CHRONIC PAIN SYNDROME: ICD-10-CM

## 2025-05-18 DIAGNOSIS — E08.42 DIABETIC POLYNEUROPATHY ASSOCIATED WITH DIABETES MELLITUS DUE TO UNDERLYING CONDITION: ICD-10-CM

## 2025-05-18 DIAGNOSIS — G89.4 CHRONIC PAIN DISORDER: ICD-10-CM

## 2025-05-19 ENCOUNTER — OUTPATIENT CASE MANAGEMENT (OUTPATIENT)
Dept: ADMINISTRATIVE | Facility: OTHER | Age: 80
End: 2025-05-19
Payer: MEDICARE

## 2025-05-19 RX ORDER — METHOCARBAMOL 500 MG/1
TABLET, FILM COATED ORAL
Qty: 90 TABLET | Refills: 0 | Status: SHIPPED | OUTPATIENT
Start: 2025-05-19

## 2025-05-22 NOTE — PROGRESS NOTES
Outpatient Care Management   - Patient Assessment    Patient: Indira Waters  MRN:  [id number]  Date of Service:  5/19/2025  Completed by:  Naz Mclain LMSW  Referral Date: 03/31/2025    Reason for Visit   Patient presents with    Social Work Assessment     05/19/2025         Brief Summary:  received a referral from OPCM RN  Paola Malhotra RN for the following HR SW psychosocial needs COA, transportation, SS assistance..  The patient also requests assistance with obtaining a new id. [facility] SW completed assessment w/ pt's daughter Esha per pt's request. Esha denied any food,utility assistance. Care plan was created in collaboration with patient/caregiver input.   completed the SDOH questionnaire.

## 2025-05-28 ENCOUNTER — OUTPATIENT CASE MANAGEMENT (OUTPATIENT)
Dept: ADMINISTRATIVE | Facility: OTHER | Age: 80
End: 2025-05-28
Payer: MEDICARE

## 2025-05-29 DIAGNOSIS — G89.4 CHRONIC PAIN DISORDER: ICD-10-CM

## 2025-05-29 DIAGNOSIS — E11.40 PAINFUL DIABETIC NEUROPATHY: ICD-10-CM

## 2025-05-29 DIAGNOSIS — M54.16 LUMBAR RADICULOPATHY, CHRONIC: ICD-10-CM

## 2025-05-29 DIAGNOSIS — M17.0 PRIMARY OSTEOARTHRITIS OF BOTH KNEES: ICD-10-CM

## 2025-05-30 RX ORDER — HYDROCODONE BITARTRATE AND ACETAMINOPHEN 10; 325 MG/1; MG/1
1 TABLET ORAL EVERY 8 HOURS PRN
Qty: 90 TABLET | Refills: 0 | Status: SHIPPED | OUTPATIENT
Start: 2025-05-30 | End: 2025-06-29

## 2025-05-30 NOTE — TELEPHONE ENCOUNTER
Patient requesting refill on HYDROcodone-acetaminophen (NORCO)  mg   Last office visit 03/28/25   shows last refill on 04/29/25  Patient does have a pain contract on file with [facility]  Patient last UDS 09/19/24

## 2025-06-08 DIAGNOSIS — G89.4 CHRONIC PAIN DISORDER: ICD-10-CM

## 2025-06-08 DIAGNOSIS — E08.42 DIABETIC POLYNEUROPATHY ASSOCIATED WITH DIABETES MELLITUS DUE TO UNDERLYING CONDITION: ICD-10-CM

## 2025-06-08 DIAGNOSIS — G89.4 CHRONIC PAIN SYNDROME: ICD-10-CM

## 2025-06-09 ENCOUNTER — OUTPATIENT CASE MANAGEMENT (OUTPATIENT)
Dept: ADMINISTRATIVE | Facility: OTHER | Age: 80
End: 2025-06-09
Payer: MEDICARE

## 2025-06-09 RX ORDER — METHOCARBAMOL 500 MG/1
500 TABLET, FILM COATED ORAL 3 TIMES DAILY
Qty: 90 TABLET | Refills: 2 | Status: SHIPPED | OUTPATIENT
Start: 2025-06-09

## 2025-06-11 ENCOUNTER — TELEPHONE (OUTPATIENT)
Dept: INTERNAL MEDICINE | Facility: CLINIC | Age: 80
End: 2025-06-11
Payer: MEDICARE

## 2025-06-16 ENCOUNTER — OUTPATIENT CASE MANAGEMENT (OUTPATIENT)
Dept: ADMINISTRATIVE | Facility: OTHER | Age: 80
End: 2025-06-16
Payer: MEDICARE

## 2025-06-16 NOTE — PROGRESS NOTES
Outpatient Care Management  Plan of Care Follow Up Visit    Patient: Indira Waters  MRN: [id number]  Date of Service: 06/16/2025  Completed by: Madina Malhotra RN  Referral Date: 03/31/2025    Reason for Visit   Patient presents with    OPCM RN Follow Up Call    Nursing Assessment       Brief Summary:   MEMBER'S CURRENT STATUS  :  -CKD definition:  Patient's daughter, Esha, states that CKD is when the kidney's are over worked and cannot function properly.  She states that is all she knows.  She states she received the educational materials and has started reading them.  Esha states the patient had a lot of pain today in her left knee so they put pain cream on the knee, gave patient some pain medication, used heat and cold on it and the patient is now resting finally.      -S&S of CKD:  Patient's daughter, Esha, states that she does not know any signs and symptoms of CKD.      -Low sodium diet and fluids:  Esha states that she tries to give the patient a low sodium diet.  She states she uses Mrs Dash, low sodium Slap Ya Mama's, and Pat in da Pot low sodium seasonings.      -Comorbid risk factors:  Esha states she did not know that dehydration, blood sugar and blood pressures can all affect the kidneys.    -Anemia definition:  Patient's daughter, Esha, states she knows that anemia is the low blood count of cells, red blood cells in particular.      -S&S of Anemia:  Patient's daughter, Esha, states she does not know any of the signs and symptoms of anemia.      -Diet:  Esha states that she got a high iron diet online and has been giving the patient high iron foods to help boost her iron levels.           INTERVENTIONS  :  -CKD definition:   reviewed that chronic or end-stage renal disease includes a wide spectrum of kidney conditions ranging from decreased function requiring monitoring to serious impairment requiring dialysis or renal transplant. With advanced chronic kidney disease, dangerous  levels of fluid, electrolytes and waste can accumulate, causing kidney damage. Damage can slowly occur and worsen over many years. Symptoms may not become apparent until the disease is far advanced. Diabetes and hypertension are common causes. It may also be an inherited condition (genetic).      -S&S of CKD:   LESTER reviewed the signs and symptoms of CKD including anorexia; fatigue; weakness; difficulty sleeping; confusion; frequent urination, especially at night; muscle cramps at night; edema; periorbital edema, especially in the morning; nausea and vomiting; itchy skin (pruritus); weight gain or decreased urine output.     -Low sodium diet and fluids:  CM commended Lora for using the low sodium alternative seasonings she is using.  LESTER also told Lora about Marquez's Perfect Pinch Salt Free and No Salt Casper Chachere's so she can have more options for variety if she wants.  LESTER also reviewed with lora that the patient should drink at least 1500 ml of fluids a day, preferably water, and to not go over 2000 ml a day due to her CHF.  CM educated Lora that it is important for the patient to keep her kidney's well hydrated.      -Comorbid risk factors:  CM stressed to lora that it is very important for the patient to keep her diabetes and HTN under control because high blood sugars, high blood pressures, and dehydration are detrimental to the kidneys.    -Anemia definition:  CM commended patient's daughter, Lora, for knowing that anemia is low blood counts of the red blood cells.  LESTER explained to the patient what the hemoglobin does for the body with carrying the oxygen throughout the body.      -S&S of Anemia:  LESTER reviewed signs and symptoms of anemia including fatigue, Breathlessness, tachypnea, tachycardia, cold hands and feet, dizziness, headache, pale skin, brittle nails and craving unusual food.  LESTER reviewed with Lora how to check the patient's bottom, inner eyelid for paleness.      -Diet:  LESTER  commended Esha for giving the patient a high iron diet.  CM encouraged Esha to keep up this practice.          SDOH completed by Naz ALSTON and reviewed by this CM with assistance given to help patient and daughter, Esha, to obtain new identification and social security cards that were lost so she can apply for financial assistance.    Next steps:   Follow up on what CKD is  Follow up on S&S of CKD  Follow up on what Anemia is  Follow up on S&S of Anemia  Follow up on how to check for paleness  Follow up on low sodium diet  Follow up on fluid restriction [id number] ml  Follow up on what BP and BS running

## 2025-06-19 ENCOUNTER — PATIENT MESSAGE (OUTPATIENT)
Dept: HEMATOLOGY/ONCOLOGY | Facility: CLINIC | Age: 80
End: 2025-06-19
Payer: MEDICARE

## 2025-06-23 DIAGNOSIS — Z00.00 ENCOUNTER FOR MEDICARE ANNUAL WELLNESS EXAM: ICD-10-CM

## 2025-06-24 ENCOUNTER — OUTPATIENT CASE MANAGEMENT (OUTPATIENT)
Dept: ADMINISTRATIVE | Facility: OTHER | Age: 80
End: 2025-06-24
Payer: MEDICARE

## 2025-06-24 ENCOUNTER — PATIENT MESSAGE (OUTPATIENT)
Dept: INTERNAL MEDICINE | Facility: CLINIC | Age: 80
End: 2025-06-24
Payer: MEDICARE

## 2025-06-24 NOTE — PROGRESS NOTES
"Outpatient Care Management  Plan of Care Follow Up Visit    Patient: Indira Waters  MRN: [id number]  Date of Service: 06/24/2025  Completed by: Madina Malhotra RN  Referral Date: 03/31/2025    Reason for Visit   Patient presents with    Other     Notification of problems with Air Loss Mattress order.       Brief Summary: LESTER received a phone message and call from Yohana Box, an  at [facility], to connect a call from Esha, patient's daughter.  Esah states that she is very upset because she was speaking to Caro from [facility] who stated to Esha that "someone in Dr. Zapata's office said that the patient may be lying about a buttock wound to get the mattress".  (Dr. Zapata had put in an order for a Low Air Loss Matress on 4/15 for a buttock wound that the patient has.)  Esha also states that Caro told her that they would be responsible to pay $2000 for the mattress if someone from Dr. Zapata's office charts that the patient is lying about the wound in her chart because Medicare would refuse it.  LESTER then called [facility] and found out that Caro charted that the patient may not have a stage 3 buttock wound which is a requirement for the low air loss mattress that was ordered.  LESTER called Esha back to inform her of questioning of the stage 3 to which she responded that Caro told her that someone from Dr. Zapata's office stated this about her mother lying about the wound.  Esha also very tearfully said that they would be refusing the mattress because she does not have $2000 to pay for the mattress if Medicare does not pay for it and she cannot deal with that right now.  She states that the wound is improving and they will just keep doing what they are doing already.  LESTER sent Dr. Zapata a detailed message concerning this.      "

## 2025-06-25 ENCOUNTER — DOCUMENT SCAN (OUTPATIENT)
Dept: HOME HEALTH SERVICES | Facility: HOSPITAL | Age: 80
End: 2025-06-25
Payer: MEDICARE

## 2025-06-30 ENCOUNTER — OUTPATIENT CASE MANAGEMENT (OUTPATIENT)
Dept: ADMINISTRATIVE | Facility: OTHER | Age: 80
End: 2025-06-30
Payer: MEDICARE

## 2025-06-30 DIAGNOSIS — M54.16 LUMBAR RADICULOPATHY, CHRONIC: ICD-10-CM

## 2025-06-30 DIAGNOSIS — E11.40 PAINFUL DIABETIC NEUROPATHY: Primary | ICD-10-CM

## 2025-06-30 DIAGNOSIS — G89.4 CHRONIC PAIN DISORDER: ICD-10-CM

## 2025-06-30 DIAGNOSIS — M17.0 PRIMARY OSTEOARTHRITIS OF BOTH KNEES: ICD-10-CM

## 2025-06-30 RX ORDER — HYDROCODONE BITARTRATE AND ACETAMINOPHEN 10; 325 MG/1; MG/1
1 TABLET ORAL EVERY 8 HOURS PRN
Qty: 90 TABLET | Refills: 0 | Status: SHIPPED | OUTPATIENT
Start: 2025-06-30 | End: 2025-07-30

## 2025-06-30 NOTE — LETTER
Indira Waters  [address]   [place] [address]      Dear Indira Waters,     I am your nurse with [facility]. I was unsuccessful in reaching you today. At your earliest convenience, I would like to discuss your healthcare progress.      Please contact me at [phone] from 8:00AM to 4:30 PM on Monday thru Friday.     As you know, [facility] is a program offered to you through [facility] where a nurse is available 24/7 to answer questions or provide medical advice, their number is [phone].    Thanks,    Paola Malhotra, RN  Outpatient Care Management

## 2025-07-01 ENCOUNTER — OFFICE VISIT (OUTPATIENT)
Dept: PAIN MEDICINE | Facility: CLINIC | Age: 80
End: 2025-07-01
Payer: MEDICARE

## 2025-07-01 ENCOUNTER — LAB VISIT (OUTPATIENT)
Dept: LAB | Facility: OTHER | Age: 80
End: 2025-07-01
Attending: STUDENT IN AN ORGANIZED HEALTH CARE EDUCATION/TRAINING PROGRAM
Payer: MEDICARE

## 2025-07-01 VITALS
BODY MASS INDEX: 40.79 KG/M2 | WEIGHT: 216.06 LBS | SYSTOLIC BLOOD PRESSURE: 125 MMHG | HEIGHT: 61 IN | DIASTOLIC BLOOD PRESSURE: 57 MMHG | HEART RATE: 79 BPM

## 2025-07-01 DIAGNOSIS — G89.4 CHRONIC PAIN DISORDER: Primary | ICD-10-CM

## 2025-07-01 DIAGNOSIS — E11.22 TYPE 2 DIABETES MELLITUS WITH CHRONIC KIDNEY DISEASE, WITH LONG-TERM CURRENT USE OF INSULIN, UNSPECIFIED CKD STAGE: ICD-10-CM

## 2025-07-01 DIAGNOSIS — D64.9 ANEMIA, UNSPECIFIED TYPE: ICD-10-CM

## 2025-07-01 DIAGNOSIS — M17.0 PRIMARY OSTEOARTHRITIS OF BOTH KNEES: ICD-10-CM

## 2025-07-01 DIAGNOSIS — M17.0 PRIMARY OSTEOARTHRITIS OF BOTH KNEES: Primary | ICD-10-CM

## 2025-07-01 DIAGNOSIS — E11.40 PAINFUL DIABETIC NEUROPATHY: ICD-10-CM

## 2025-07-01 DIAGNOSIS — Z79.4 TYPE 2 DIABETES MELLITUS WITH CHRONIC KIDNEY DISEASE, WITH LONG-TERM CURRENT USE OF INSULIN, UNSPECIFIED CKD STAGE: ICD-10-CM

## 2025-07-01 LAB
ABSOLUTE EOSINOPHIL (OHS): 0.36 K/UL
ABSOLUTE MONOCYTE (OHS): 1.1 K/UL (ref 0.3–1)
ABSOLUTE NEUTROPHIL COUNT (OHS): 11.75 K/UL (ref 1.8–7.7)
ALBUMIN SERPL BCP-MCNC: 3.7 G/DL (ref 3.5–5.2)
ALP SERPL-CCNC: 66 UNIT/L (ref 40–150)
ALT SERPL W/O P-5'-P-CCNC: 14 UNIT/L (ref 10–44)
ANION GAP (OHS): 13 MMOL/L (ref 8–16)
AST SERPL-CCNC: 19 UNIT/L (ref 11–45)
BASOPHILS # BLD AUTO: 0.05 K/UL
BASOPHILS NFR BLD AUTO: 0.3 %
BILIRUB SERPL-MCNC: 0.2 MG/DL (ref 0.1–1)
BUN SERPL-MCNC: 28 MG/DL (ref 8–23)
CALCIUM SERPL-MCNC: 9.8 MG/DL (ref 8.7–10.5)
CHLORIDE SERPL-SCNC: 99 MMOL/L (ref 95–110)
CO2 SERPL-SCNC: 26 MMOL/L (ref 23–29)
CREAT SERPL-MCNC: 1.6 MG/DL (ref 0.5–1.4)
EAG (OHS): 183 MG/DL (ref 68–131)
ERYTHROCYTE [DISTWIDTH] IN BLOOD BY AUTOMATED COUNT: 15.6 % (ref 11.5–14.5)
GFR SERPLBLD CREATININE-BSD FMLA CKD-EPI: 33 ML/MIN/1.73/M2
GLUCOSE SERPL-MCNC: 220 MG/DL (ref 70–110)
HBA1C MFR BLD: 8 % (ref 4–5.6)
HCT VFR BLD AUTO: 31.8 % (ref 37–48.5)
HGB BLD-MCNC: 9.9 GM/DL (ref 12–16)
IMM GRANULOCYTES # BLD AUTO: 0.1 K/UL (ref 0–0.04)
IMM GRANULOCYTES NFR BLD AUTO: 0.6 % (ref 0–0.5)
LYMPHOCYTES # BLD AUTO: 2.57 K/UL (ref 1–4.8)
MCH RBC QN AUTO: 26.8 PG (ref 27–31)
MCHC RBC AUTO-ENTMCNC: 31.1 G/DL (ref 32–36)
MCV RBC AUTO: 86 FL (ref 82–98)
NUCLEATED RBC (/100WBC) (OHS): 0 /100 WBC
PLATELET # BLD AUTO: 336 K/UL (ref 150–450)
PMV BLD AUTO: 12.5 FL (ref 9.2–12.9)
POTASSIUM SERPL-SCNC: 4.3 MMOL/L (ref 3.5–5.1)
PROT SERPL-MCNC: 7.9 GM/DL (ref 6–8.4)
RBC # BLD AUTO: 3.69 M/UL (ref 4–5.4)
RELATIVE EOSINOPHIL (OHS): 2.3 %
RELATIVE LYMPHOCYTE (OHS): 16.1 % (ref 18–48)
RELATIVE MONOCYTE (OHS): 6.9 % (ref 4–15)
RELATIVE NEUTROPHIL (OHS): 73.8 % (ref 38–73)
SODIUM SERPL-SCNC: 138 MMOL/L (ref 136–145)
WBC # BLD AUTO: 15.93 K/UL (ref 3.9–12.7)

## 2025-07-01 PROCEDURE — 83036 HEMOGLOBIN GLYCOSYLATED A1C: CPT

## 2025-07-01 PROCEDURE — 99999 PR PBB SHADOW E&M-EST. PATIENT-LVL III: CPT | Mod: PBBFAC,,, | Performed by: NURSE PRACTITIONER

## 2025-07-01 PROCEDURE — 64999 UNLISTED PX NERVOUS SYSTEM: CPT | Mod: PBBFAC | Performed by: NURSE PRACTITIONER

## 2025-07-01 PROCEDURE — 80053 COMPREHEN METABOLIC PANEL: CPT

## 2025-07-01 PROCEDURE — 85025 COMPLETE CBC W/AUTO DIFF WBC: CPT

## 2025-07-01 PROCEDURE — 36415 COLL VENOUS BLD VENIPUNCTURE: CPT

## 2025-07-01 PROCEDURE — 99213 OFFICE O/P EST LOW 20 MIN: CPT | Mod: PBBFAC | Performed by: NURSE PRACTITIONER

## 2025-07-01 PROCEDURE — 99999PBSHW PR PBB SHADOW TECHNICAL ONLY FILED TO HB: Mod: JZ,PBBFAC,,

## 2025-07-01 NOTE — PROGRESS NOTES
Subjective:      Patient ID: Indira Waters is a 80 y.o. female.    Chief Complaint:  Diabetes and Osteoporosis    History of Present Illness  Indira Waters is here for follow up of Osteoporosis and DM.  Previously seen by me 12/2024.  This is a MyChart video visit.    The patient location is: [place]  The chief complaint leading to consultation is: Osteoporosis  Visit type: Virtual visit with synchronous audio and video  Total time spent with patient: see below  Each patient to whom he or she provides medical services by telemedicine is:  (1) informed of the relationship between the physician and patient and the respective role of any other health care provider with respect to management of the patient; and (2) notified that he or she may decline to receive medical services by telemedicine and may withdraw from such care at any time.    Patients daughter, Esha, present for visit.    With regards to Osteoporosis:     BMD:   1/2025  The L1 to L4 vertebral bone mineral density is equal to 0.848 g/cm squared with a T score of -2.8.  The left femoral neck bone mineral density is equal to 0.553 g/cm squared with a T score of -3.5.  The total hip bone mineral density is equal to 0.648 g/cm squared with a T score of -2.9.  There is a 36.6% risk of a major osteoporotic fracture and a 16.0% risk of hip fracture in the next 10 years (FRAX).  Impression:  Osteopenia.    Medications:   Forteo  Start 10/2022  Stop: 10/2024     Prolia ordered 12/2024 - has not started - unclear reason why    Calcium intake: None  Vit D intake: OTC Vit D3 1000iu daily     Weight bearing exercise: None   Falls: Denies any in the last 12 months  Fractures: Denies any in the last 12 months   Significant height loss (>2 inches): Unsure     Family history: GM, Mother    Tobacco Use: Denies  Alcohol Use: Denies  Glucocorticoid History: Denies   Anticoagulant Use: Aspirin  GERD/PPI Use: Yes  History of Malabsorption: Denies  Antiseizure  Medications: Denies  History of Thyroid Disease: Denies  Kidney Disease: Yes  Personal history of kidney stones: in the past   MI:   Stroke:   Dental Visit: Dentures     Lab Results   Component Value Date    CALCIUM 9.8 2025    PHOS 4.2 2025     Vit D, 25-Hydroxy   Date Value Ref Range Status   2025 54 30 - 96 ng/mL Final     Comment:     Vitamin D deficiency.........<10 ng/mL                              Vitamin D insufficiency......10-29 ng/mL       Vitamin D sufficiency........> or equal to 30 ng/mL  Vitamin D toxicity............>100 ng/mL         With regards to Diabetes:    Diagnosed:   Education - last visit: 3/2025  Known complications:  DKA   RN +  Eye Exam: 2023 - trying to get an appointment   PN +  Podiatry: 2021  Nephropathy +  CAD +  Denies history of pancreatitis & personal/family history of medullary thyroid cancer.     Diet/Exercise:  Eats 3 meals a day.   Snacks : yes.   Drinks : water, milk.  Exercise: None   Recent illness, injury, steroids: 2025 - admitted for sepsis - prednisone      Current Regimen: patient or her daughter administers insulin   Lantus 26 units in AM and 26 units in PM   Humalog 13-13-11 units plus correction scale before meals only  Add correction scale as needed.  Blood sugar 200 to 250 add 1 units  Blood sugar 251 to 300 add 2 units  Blood sugar greater than 301 add 3 units  If glucose < 150 she will give 1/2 a dose.     Reports compliance.     Other medications tried:  Metformin- stopped for unclear reasons but maybe related to kidney disease  Actos  Insulin 70/30  Januvia     Glucose Monitor: Dexcom G7 -   -demarcus does not work on phone (does not have a computer to upload from home)   5 times a day testing  Oral recall - writing down glucose readings  Fastin-130  Reports PP hyperglycemia but trends back down (for the most part)  Before bed: 120-240  Does report glucose trends down overnight     Hypoglycemia: having issues  with false lows on CGM and treating, now knows to check  Denies any recent hypoglycemic episodes   Knows how to correct with 15 grams of carbs- juice, coke, or a peppermint.     Diabetes Management Status    Hemoglobin A1C   Date Value Ref Range Status   03/06/2025 7.9 (H) 4.0 - 5.6 % Final     Comment:     ADA Screening Guidelines:  5.7-6.4%  Consistent with prediabetes  >or=6.5%  Consistent with diabetes    High levels of fetal hemoglobin interfere with the HbA1C  assay. Heterozygous hemoglobin variants (HbS, HgC, etc)do  not significantly interfere with this assay.   However, presence of multiple variants may affect accuracy.     12/03/2024 7.5 (H) 4.0 - 5.6 % Final     Comment:     ADA Screening Guidelines:  5.7-6.4%  Consistent with prediabetes  >or=6.5%  Consistent with diabetes    High levels of fetal hemoglobin interfere with the HbA1C  assay. Heterozygous hemoglobin variants (HbS, HgC, etc)do  not significantly interfere with this assay.   However, presence of multiple variants may affect accuracy.     09/24/2024 7.5 (H) 4.0 - 5.6 % Final     Comment:     ADA Screening Guidelines:  5.7-6.4%  Consistent with prediabetes  >or=6.5%  Consistent with diabetes    High levels of fetal hemoglobin interfere with the HbA1C  assay. Heterozygous hemoglobin variants (HbS, HgC, etc)do  not significantly interfere with this assay.   However, presence of multiple variants may affect accuracy.       Hemoglobin A1c   Date Value Ref Range Status   07/01/2025 8.0 (H) 4.0 - 5.6 % Final     Comment:     ADA Screening Guidelines:  5.7-6.4%  Consistent with prediabetes  >=6.5%  Consistent with diabetes    High levels of fetal hemoglobin interfere with the HbA1C  assay. Heterozygous hemoglobin variants (HbS, HgC, etc)do  not significantly interfere with this assay.   However, presence of multiple variants may affect accuracy.   04/01/2025 7.6 (H) 4.0 - 5.6 % Final     Comment:     ADA Screening Guidelines:  5.7-6.4%  Consistent  with prediabetes  >=6.5%  Consistent with diabetes    High levels of fetal hemoglobin interfere with the HbA1C  assay. Heterozygous hemoglobin variants (HbS, HgC, etc)do  not significantly interfere with this assay.   However, presence of multiple variants may affect accuracy.       Statin: Not taking  ACE/ARB: Taking  Screening or Prevention Patient's value Goal Complete/Controlled?   HgA1C Testing and Control   Lab Results   Component Value Date    HGBA1C 8.0 (H) 07/01/2025      Annually/Less than 8% No   Lipid profile : 03/06/2025 Annually Yes   LDL control Lab Results   Component Value Date    LDLCALC 97.4 03/06/2025    Annually/Less than 100 mg/dl  No   Nephropathy screening Lab Results   Component Value Date    LABMICR 7.0 12/03/2024     Lab Results   Component Value Date    PROTEINUA Negative 03/31/2025    Annually Yes   Blood pressure BP Readings from Last 1 Encounters:   07/01/25 (!) 125/57    Less than 140/90 Yes   Dilated retinal exam : 01/10/2023 Annually Yes   Foot exam   Most Recent Foot Exam Date: Not Found Annually Yes       Review of Systems  As above    There were no vitals taken for this visit.      There is no height or weight on file to calculate BMI.    Lab Review:   Lab Results   Component Value Date    HGBA1C 8.0 (H) 07/01/2025    HGBA1C 7.6 (H) 04/01/2025    HGBA1C 7.9 (H) 03/06/2025       Lab Results   Component Value Date    CHOL 190 03/06/2025    HDL 39 (L) 03/06/2025    LDLCALC 97.4 03/06/2025    TRIG 268 (H) 03/06/2025    CHOLHDL 20.5 03/06/2025     Lab Results   Component Value Date     07/01/2025    K 4.3 07/01/2025    CL 99 07/01/2025    CO2 26 07/01/2025     (H) 07/01/2025    BUN 28 (H) 07/01/2025    CREATININE 1.6 (H) 07/01/2025    CALCIUM 9.8 07/01/2025    PROT 7.9 07/01/2025    ALBUMIN 3.7 07/01/2025    BILITOT 0.2 07/01/2025    ALKPHOS 66 07/01/2025    AST 19 07/01/2025    ALT 14 07/01/2025    ANIONGAP 13 07/01/2025    ESTGFRAFRICA 42 (A) 07/14/2022    EGFRNONAA 37  (A) 07/14/2022    TSH 2.059 06/05/2024     Vit D, 25-Hydroxy   Date Value Ref Range Status   03/06/2025 54 30 - 96 ng/mL Final     Comment:     Vitamin D deficiency.........<10 ng/mL                              Vitamin D insufficiency......10-29 ng/mL       Vitamin D sufficiency........> or equal to 30 ng/mL  Vitamin D toxicity............>100 ng/mL       Assessment and Plan     1. Type 2 diabetes mellitus with chronic kidney disease, with long-term current use of insulin, unspecified CKD stage  blood-glucose meter kit    lancets Misc    blood sugar diagnostic Strp    glucagon (BAQSIMI) 3 mg/actuation Spry    Comprehensive Metabolic Panel    Hemoglobin A1C      2. Age-related osteoporosis without current pathological fracture  Vitamin D          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 disease  -- RTC in 4 months.  -- A1c goal < or = 8%.  -- Medications discussed:  MFM - CKD  Insulin   -- Reviewed logs/CGM:  Glucose variable.  Instructed to send glucose logs in 7-14 days.  Reach out to me sooner for any glucose <70 or consistently >200.  -- Baqsimi ordered and discussed use.  -- Medication Changes:   Lantus 26 units in AM and 26 units in PM   Humalog 13-13-11 units plus correction scale before meals only  Add correction scale as needed.  Blood sugar 200 to 250 add 1 units  Blood sugar 251 to 300 add 2 units  Blood sugar greater than 301 add 3 units  If glucose < 150 she will give 1/2 a dose.     -- Reviewed goals of therapy are to get the best control we can without hypoglycemia.  -- Reviewed patient's current insulin regimen. Clarified proper insulin dose and timing in relation to meals, etc. Insulin injection sites and proper rotation instructed.    -- Advised frequent self blood glucose monitoring.  Patient encouraged to document glucose results and bring them to every clinic visit.  -- Hypoglycemia precautions discussed. Instructed on precautions before driving.    -- Call for Bg repeatedly < 90 or  > 180.   -- Close adherence to lifestyle changes recommended.   -- Periodic follow ups for eye evaluations, foot care and dental care suggested.    Age-related osteoporosis without current pathological fracture  -- Risks include  race, menopause, history of smoking,  +FH, PPI therapy.  -- Reviewed basics of quantity versus quality.  -- Reassuring they are not fracturing.  -- Recommend:  -Pharmacological therapy is recommended for patients with osteopenia if the 10 year probability of a hip fracture is >3% and 10 year probability of other major osteoporotic fractures is >20%.  Treatment options and potential side effects discussed for PO bisphosphonates, reclast, Denosumab, and Teriparatide.   -Treatment:   Forteo   Start 10/2022  Stop 10/2024  LOV - plan was to start Prolia - not sure why this was not schedule. Will resubmit order for Prolia in clinic with clinical pharmacsit.   -Calcium and Vitamin D RDD provided.  -Exercise: recommended.  -Fall precautions made in the home.  -Alerted that if dental work needs to be done it should be done prior to initiating therapy.   -- Repeat DEXA scan 1/2027.  -- Reviewed BMD - stable.      Follow up in about 3 months (around 10/8/2025).      I spent 30 minutes face-to-face with the patient, over half of the visit was spent on counseling and/or coordinating the care of the patient.    Counseling includes:  Diagnostic results, impressions, recommendations   Prognosis   Risk and benefits of management/treatment options   Instructions for management treatment and or follow-up   Importance of compliance with management   Risk factor reduction   Patient education    Visit today included increased complexity associated with the care of the problems addressed and managing the longitudinal care of the patient due to the serious and/or complex managed problems.

## 2025-07-01 NOTE — PROGRESS NOTES
Chronic patient Established Note (Follow up visit)        Interval History 7/1/2025:  The patient returns to clinic today for follow up of back and foot pain. She continues to report diabetic nerve pain of the feet. This pain is worse with standing and walking. She continues to report low back pain. She also reports increased bilateral knee pain. This is worse with walking. She is interested in repeat injections. She is taking Lyrica, Robaxin, and Norco. She denies any adverse effects. She denies any other health changes. Her pain today is 9/10.    Interval History 3/28/2025:  Ms. Jacobson returns to clinic today for follow up of foot pain. She reports worsened diabetic nerve pain over the last weeks. She notes that she can tell when it is time for the patch application. She continues to report low back pain. She has intermittent radiating pain into the legs. She reports benefit with current medication regimen. She is taking Robaxin, Lyrica, and Norco. She denies any adverse effects. She denies any other health changes. Her pain today is 4/10.    Interval History 1/23/2025:   Patient returns to clinic today for follow up of back pain. Her pain is focused to the lower back that is mainly axial in nature, but also radiates down the bilateral legs to the top and bottom of the foot. She states the pain gets better with laying flat. States the it is worse when bending down vs extension.  She continues taking Robaxin and Lyrica and Norco TID, these provide moderate relief. She denies red flag symptoms. She rates the pain as 8/10. . At her previous visit on 10/31/24, she had a positive multifidus lift test and positive prone instability test with MRI finding of multifidus  muscle atrophy and no facet arthropathy   In the past She underwent Nevro trial SCS for PDN which was not successful to relief her foot pain.    Interval History 12/27/2024:  The patient returns to clinic today for follow up of back and foot pain. She  continues to diabetic nerve pain of her bilateral feet. This is burning in nature. This pain is worse with walking. She does have swelling into the feet. She does have benefit with Qutenza patches. She continues to take Lyrica, Robaxin, and Norco as needed. She denies any adverse effects. She denies any other health changes. Her pain today is 10/10.    Interval History 10/31/24:   She is here for follow up of lower back pain and L knee pain. She states the pain gets better with laying flat. States the it is worse when bending down vs extension. She reports increased Tylenol and Ibuprofen use. She states she uses a walker to get around as well as a rollater walker. She rates the pain as a 9/10. She reports radicular symptoms on the L side.  Was doing physical therapy and home PT but has since finished. Still doing home exercises.     Interval History 9/24/2024:  The patient returns to clinic today for follow up of back and foot pain. She reports worsened neuropathic pain of her bilateral feet. She describes this as burning in nature. Her pain is worse with walking. She also notes swelling into the feet. She does have benefit with Qutenza patches. She denies any adverse effects. She is scheduled for knee injection in November. She is taking Lyrica and Robaxin. She also takes Norco as needed with benefit. She denies any adverse effects. She denies any other health changes. Her pain today is 7/10.    Interval History 9/19/2024:  Patient returns to clinic today for follow up of low back pain. Left worse than right. She continues to take her medication as prescribed: Robaxin 500mg TID PRN, Lyrica 150mg TID, Norco 10 TID PRN. She will return to clinic this coming Tuesday for Qutenza patch for PDN. Patient seen by BRAN Felix on 9/3/24 and was sent for new lumbar MRI, which she completed on 9/10/24, results below. Her back pain is aching, throbbing, and stabbing. Rated as a 10/10. Pain is worse with standing, bending forward,  and leaning back. Improved with resting. Denies red flag symptoms.      Patient also endorses bilateral knee pain. She has received bilateral knee steroid injections and inquires about repeating those (L knee 3/28/24, R knee 4/18/24). She utilizes a cane to ambulate. L knee is worse than R knee. Pain focused to the lateral knees.     Interval History 9/3/2024:  The patient returns to clinic today for follow up of pain. Since last visit, she was admitted for CHF exacerbation. She spent a few weeks in inpatient rehab. She is currently participating in home health PT. She reports worsened low back pain that radiates into the posterolateral aspect of both legs to her feet, left grater than right. Her pain is worse with walking, and getting in/out of a vehicle. She continues to report left knee pain. She does have benefit with Qutenza patches and needs to schedule the next application. She is taking Lyrica and Robaxin. She also takes Norco as needed for severe pain with benefit. She denies any adverse effects. She has been out of medication since Sunday. Her pain today is 10/10.    Interval History 5/10/2024:  The patient returns to clinic today for follow up of pain. She is s/p left knee steroid injection on 3/28/2024. She reports limited relief. She is s/p right knee steroid injection on 4/18/2024. She reports significant relief of her right knee pain. She continues to report neuropathic pain into her bilateral feet. She describes this pain as burning and tingling in nature. She does have benefit with Qutenza patches. She is taking Lyrica. She also takes Norco as needed with benefit. She denies any adverse effects. She denies any other health changes. Her pain today is 9/10.    Interval History 3/14/2024:  Indira Waters returns today for f/u of her painful neuropathy in the feet. She was last seen in January for this complaint. Qutenza patches were administered at that time. We also continued medication  management with Robaxin, Lyrica, and Norco. She reports incredible relief of neuropathic symptoms with qutenza injections. Significantly improves walking ability. Primary complaint today is left knee pain. Started several years ago. Has been mostly intermittent over the years, but has been more constant and severe over the past 2 months. Localized over medial joint line. No falls. Pain today is an 8/10. Continues Norco 10/325 mg TID, consistently. No adverse effects. Feels like medications continue to help with functional mobility and ADL ability..     Interval History 1/26/2024:  The patient returns to clinic today for foot pain. She continues to report neuropathic pain into her feet. She describes this as burning in nature. She does find benefit with Qutenza patches. She denies any rash or increased pain after the patch application. She continues to take Lyrica, Robaxin, and Norco with benefit. She denies any adverse effects. She denies any other health changes. Her pain today is 8/10.    Interval History 12/14/2023:  The patient returns to clinic today for follow up of back and leg pain. She continues to report low back pain that radiates into both legs. She continues to report bilateral neuropathic pain into the feet. She states that today is a bad day. She is having more left foot and toe pain. Her pain is worse with prolonged standing and walking. She does have benefit with Qutenza patches. She is taking Lyrica, Robaxin, and Norco with benefit and without adverse effects. She denies other health changes. Her pain today is 8/10.    Interval History 10/11/2023:  The patient returns to clinic today for follow up of bilateral foot pain. She continues to report neuropathic pain to her feet. Her pain is worse with standing and walking. She does have benefit with Qutenza patches. She also takes Lyrica, Robaxin, and Norco. She denies any adverse effects. She denies any other health changes. Her pain today is  8/10.    Interval History 9/15/2023:  The patient returns to clinic today for follow up of back and leg pain. She did have benefit with Qutenza patches. She continues to report low back pain with intermittent radicular leg pain. She continues to report neuropathic pain into her feet. She continues to take Lyrica and Robaxin with benefit. She also takes Norco as needed with benefit. She denies any adverse effects. She denies any other health changes. Her pain today is 9/10.    Interval History 6/27/2023:  The patient returns to clinic today for follow up of pain. She continues to report nerve pain to her feet bilaterally. This pain is worse with standing and walking. She is taking Lyrica and Robaxin. She also takes Norco as needed with benefit and without adverse effects. She denies any other health changes. Her pain today is 9/10.    Interval History 6/16/2023:  The patient returns to clinic today for follow up of back and leg pain. She continues to report low back pain. She also reports diabetic neuropathy to her bilateral feet. Her pain is worse with standing and walking. She has tried Qutenza patches with benefit in the past. She continues to take Robaxin and Lyrica. She also takes Norco as needed with benefit and without adverse effects. She denies any other health changes. Her pain today is 10/10.    Interval History 3/17/2023:  Mrs Waters presents for follow up of chronic, continuous neuropathic pain to [facility]. She is s/p Nevro SCS trial and unfortunately she did not get the relief she was hoping for and 50% at best relief but this was not consistent. She has no constitutional s/s concerning for infection and denies newer neurological changes since trial. She continues with medication mgt and states Qutenza application has been providing significant benefit.     Interval History 2/10/2023:  Mrs Waters presents for follow up of chronic lower back painn and radicular pain in conjunction with PDN to bilateral feet.  She is doing fair with medication mgt of Norco, Robaxin, and Lyrica and does need refill at this time. She denies SE of medications. She is also here for Qutenza application today. She is scheduled to have upcoming SCS trial with [facility] to aslo address her PDN.     Interval History 12/13/2022:  Mrs Waters presents for follow up of chronic pain. She is ready to repeat Qutenza application as it has been >91 days and she had significant improvement of symptoms of PDN. She recently had repeat A1C and just above 10.0 but is decreasing. She continues to take medication with benefit and denies SE of medication. Medication regimen include Norco 10/325mg TID, Robaxin 500mg and Lyrica 150mg.     Interval History 10/12/2022:  Mrs Waters presents for follow up of chronic neuropathy. She is s/p qutenza patch placement with improved functioning and neuropathy control. Unfortunately her A1C was above 11 which inhibits her from Nevro SCS trial at this time. She is eager to have SCS trial. She denies new areas of pain or neurological changes. She continued to take  Norco 10/325mg TID, Robaxin 500mg and Lyrica 150mg TID with benefit and denies significant SE of medications.     Interval History 9/8/2022:  Mrs Waters presents for follow up of chronic lower back painn and radicular pain in conjunction with PDN to bilateral feet. She is doing fair with medication mgt of Norco, Robaxin, and Lyrica and does need refill at this time. She denies SE of medications. She is patiently awaiting her A1C to become lower than 10 to proceed with SCS trial.     Interval History 8/12/2022:  Mrs Waters presents for delayed FU. She has had continuous pain to lumbar and radicular pain but also peripheral neuropathy complaints. Over interval she has had CVA but doing fair. Her A1C has unfortunately elevated above 10 at this time and SCS trial placed on hold but phychiatric evaluation complete. She continues to take Norco 10/325mg TID, Robaxin 500mg TID and  Lyrica 150mg TID with some benefit and denies SE of medications. No s/s concerning for cauda equina.     Interval History 4/12/2022:  Patient returns to clinic today for follow-up. Her neuropathic pain continues to be an issue for her, but she says that she is able to manage. She is taking Norco 10-325mg TID, Robaxin 500mg TID, and Lyrica 150mg TID without any adverse side effects. She denies any changes in her symptoms. She would like to proceed with SCS trial. Discussed last time that her HbA1c should be <10, was 9.8 on most recent labs. Seen by psychology and cleared for SCS.     Interval History 2/14/2022:  Mrs Waters presents for follow up. Pt states overall neuropathy continues. Since last visit she has been stable with medication mgt and takign Norco 10/325mg TID, Robaxin 500mg TID and Lyrica 150mg TID. She denies new areas of pain or neurological changes. Medication adequate to control pain without adverse SE.      Interval History 12/21/2021:  Pt presents for urgent evaluation s/p fall in kitchen last night. Pt unsure of LOC or head trauma but states some amnesia of events. Pt is having left knee pain, B ankle pain L>R and lower back/buttock pain. Daughter further states tremor like activity last night. During visit daughter asked for bedside commode due to inability to ambulate.  Pt during visit appeared somnolent, pale and began vomiting. Discussed going to ER for further evaluation.      Interval History 12/14/2021:  Mrs Waters presents for follow up of chronic pain complaints. She is hopeful she will have A1C lower soon to move forward with SCS. She is doing fair with medication mgt alone at this time and denies SE of medications. Pt is currently taking Norco 10/325mg TID PRN, Lyrica 150mg TID, and Robaxin 500mg TID. She denies new areas of pain or neurological changes.      Interval History 10/14/2021:  The Pt presents for follow up and s/p B Lumbar sympathetic blocks. Pt states this has done well but  ready to proceed with SCS trial. She is aware A1C must be under tight control and Pt and daughter re-iterate knowing this. Pt also mentions DKA admission and diagnosis of vasculitis as well over interval. Pt continues to take hydrocodone 10/325 mg TID PRN, Lyrica 150 mg TID, and Robaxin 500mg TID. She denies any SE of medication, denies new neurological changes.      Interval Hx 09/16/2021  Indira Waters presents to the clinic for a follow-up appointment for f/u after bilateral lumbar sympathetic block on 8/25/21.Patient reports >50% relief after lumbar sympathetic block that lasted 2 weeks when she then developed a UTI/DKA, was admitted to the hospital and pain recurred.  Current pain intensity is 8/10.     Interval History 8/12/2021:  Indira Waters presents to the clinic for a follow-up appointment for BLE diabetic neuropathy, painful. Continues with Norco 10/325mg, Lyrica 150mg TID, Robaxin 750mg daily PRN. She had to cancel previously scheduled lumbar sympathetic block 2/2 lithotripsy for painful kidney stones, which have now passed. She still has intermittent pain with urination but overall that pain is improving. She had to cancel previous angiogram for this same reason - this has not been rescheduled yet. She denies any change in her LE pain. Denies any new neurological sxs in BLEs.     Interval History 6/10/21:  Indira Waters presents to the clinic for a 2 week follow-up appointment from lumbar sympathetic nerve block in early May. She reports minimal relief from this intervention, but did note that it helped some, briefly. Since the last visit, Indira Waters states the pain has been persistant. Current pain intensity is 10/10. Patient has chronic generalized diffuse pain, most pronounced in her BL lower extremities 2/2 diabetic neuropathy. HSe states that the pain is a little better than at her last visit. Most days are 10/10, but some days are better than others. Her pain is aggravated by  "exertion, walking, or sitting/standing for extended periods of time. He pain is mildly improved by rest and medications. She is currently taking Lyrica 150 PO TID, Zoloft, and Norco 10-325mg TID PRN. She states that the pain meds are helping but she is still constantly in pain and unable to participate in her ADLs. She has now established care with PCP for assistance w/ poorly controlled T2DM, last A1c 11.4. She has not yet established care w/ Rheumatology for fibromyalgia management.    Interval HPI 4/15/21:  Patient returns for follow up of chronic generalized diffuse pain. At the last visit, had EMG (not yet completed), lumbar and hip x-ray imaging ordered. She was also referred to Rheumatology for fibromyalgia management and referral to PCP for DM2 management and weaning of zoloft for transition to cymbalta for treatment of neuropathy. She had her Lyrica increased to 150mg PO TID, and prescribed Norco 10mg TID with opioid contract signed. She has been taking Norco 10mg TID and it has been helping her "bad" pains but does not take all of her pain away. She has not yet been set up with her new PCP or rheumatologist yet for fibromyalgia. Still continues to have generalized pain everywhere including feet, hips, legs, lower and upper back, neck and shoulder pain. Continues to have neuropathy in the bilateral lower extremities due to uncontrolled DM2.    Initial Visit 3/11/21:  Indira Waters presents to the clinic for the evaluation of chronic pain. Complaining of pain everywhere including feet, legs, hips, lower and upper back, neck, shoulder. Pain started 5+ years ago. Pain 10/10 at worse. She was referred here by her PCP Dr. Ramos who she has been seeing for over 6 years. She states her pain is aggravated by any physical activity/movement. She takes lyrica, robaxin, and Norco 10 TID with mild relief of pain. Per patient, she was referred here by her PCP for medication refill. She has not tried physical therapy for " several years, she states it did not help last time she was in PT. She says she is trying to exercise daily by doing yoga. She walks with a walker. She has numerous comorbidities including DM2 (Last A1C 9+ per prior family medicine note in Jan 2021), fibromyalgia, lupus, DDD. She states she would like to find a new PCP as she no longer lives near her old one. Patient denies night fever/night sweats, urinary incontinence, bowel incontinence and significant weight loss.      Pain Disability Index Review:      10/31/2024    10:55 AM 9/24/2024     1:13 PM 9/19/2024    10:18 AM   Last 3 PDI Scores   Pain Disability Index (PDI) 56 49 56     Pain Medications:  Norco 10-325mg TID, Robaxin 500mg TID, and Lyrica 150mg TID    Opioid Contract: yes     report:  Reviewed and consistent with medication use as prescribed.    Pain Procedures:    - reports possible back injection 10+ years ago  - Lumbar sympathetic nerve block 05/05/21 - Dr. Pereira - minimal relief    Physical Therapy/Home Exercise: no     Imaging:   Hip X-ray 4/7/21  FINDINGS:  Osseous structures appear intact without evidence of fracture or osseous destructive process.  No apparent dislocation.     Modest degenerative change or significant joint space narrowing.     Lumbar X-ray 4/7/21  FINDINGS:  Diffuse bony demineralization.  Vertebral bodies are normal in height without evidence of fracture.  No pars defects.     Normal sagittal alignment is preserved.  No spondylolisthesis. No abnormal translation with flexion and extension.     Intervertebral disc heights are well maintained.  Mild facet arthropathy in the lower lumbar spine.     Mild scattered vascular calcification.     Impression:     No evidence of fracture or malalignment.     Mild facet arthropathy in the lower lumbar spine.     Diffuse bony demineralization.  Consider correlation with DEXA.    Allergies:   Review of patient's allergies indicates:   Allergen Reactions    Bleach (sodium  "hypochlorite) Shortness Of Breath    Nitrofurantoin macrocrystalline Anaphylaxis    Pcn [penicillins] Shortness Of Breath    Lipitor [atorvastatin] Diarrhea and Rash    Nsaids (non-steroidal anti-inflammatory drug)      Tolerates aspirin      Statins-hmg-coa reductase inhibitors     Toradol [ketorolac]        Current Medications:   Current Outpatient Medications   Medication Sig Dispense Refill    acetaminophen (TYLENOL) 500 MG tablet Take 2 tablets (1,000 mg total) by mouth every 8 (eight) hours as needed for Pain.  0    albuterol (ACCUNEB) 1.25 mg/3 mL Nebu Take 3 mLs (1.25 mg total) by nebulization every 6 (six) hours as needed (for wheezing or shortness of breath). Rescue 300 mL 11    allopurinoL (ZYLOPRIM) 300 MG tablet Take 1 tablet (300 mg total) by mouth once daily. 90 tablet 3    aspirin (ECOTRIN) 81 MG EC tablet Take 1 tablet (81 mg total) by mouth once daily. 30 tablet 0    BD ULTRA-FINE MICRO PEN NEEDLE 32 gauge x 1/4" Ndle Use with insulin 5 times a day. 400 each 10    blood sugar diagnostic Strp To check BG 6 times daily, to use with insurance preferred meter 200 each 11    evolocumab (REPATHA SURECLICK) 140 mg/mL PnIj Inject 1 mL (140 mg total) into the skin every 14 (fourteen) days. 2 each 11    fenofibrate micronized (LOFIBRA) 134 MG Cap Take 1 capsule (134 mg total) by mouth daily with breakfast. 90 capsule 3    HYDROcodone-acetaminophen (NORCO)  mg per tablet Take 1 tablet by mouth every 8 (eight) hours as needed for Pain. 90 tablet 0    insulin glargine U-100, Lantus, (LANTUS SOLOSTAR U-100 INSULIN) 100 unit/mL (3 mL) InPn pen Inject 26 Units into the skin 2 (two) times a day. 60 mL 3    insulin lispro (HUMALOG KWIKPEN INSULIN) 100 unit/mL pen Inject 15 Units into the skin before breakfast AND 15 Units daily with lunch AND 11 Units daily with dinner or evening meal. Plus correction scale. Max TDD 50units.. 40 mL 3    ipratropium-albuteroL (COMBIVENT RESPIMAT)  mcg/actuation inhaler " Inhale 1 puff into the lungs every 6 (six) hours. 12 g 3    lancets (TRUEPLUS LANCETS) 30 gauge Misc Check blood sugar 4 times daily 400 each 2    losartan (COZAAR) 50 MG tablet Take 1 tablet (50 mg total) by mouth once daily. 90 tablet 3    melatonin (MELATIN) 3 mg tablet Take 2 tablets (6 mg total) by mouth nightly as needed for Insomnia.      methocarbamoL (ROBAXIN) 500 MG Tab Take 1 tablet (500 mg total) by mouth 3 (three) times daily. 90 tablet 2    metoclopramide HCl (REGLAN) 5 MG tablet TAKE ONE TABLET BY MOUTH FOUR TIMES DAILY AS NEEDED FOR nausea prevention 90 tablet 3    metoprolol succinate (TOPROL-XL) 100 MG 24 hr tablet Take 1 tablet (100 mg total) by mouth once daily. 90 tablet 3    miconazole nitrate 2% (MICOTIN) 2 % Oint Apply topically 2 (two) times daily. For 10 days 142 g 3    NIFEdipine (PROCARDIA-XL) 30 MG (OSM) 24 hr tablet TAKE ONE TABLET BY MOUTH TWICE DAILY 180 tablet 3    nitroGLYCERIN (NITROSTAT) 0.4 MG SL tablet DISSOLVE 1 TABLET UNDER THE TONGUE EVERY 5 MINUTES AS NEEDED FOR CHEST PAIN. DO NOT EXCEED A TOTAL OF 3 DOSES IN 15 MINUTES. 25 tablet 11    nystatin (MYCOSTATIN) cream Apply topically 2 (two) times daily. Underneath Alexus's buttpaste for the rash 30 g 1    nystatin (MYCOSTATIN) powder Apply topically 4 (four) times daily. 60 g 2    pregabalin (LYRICA) 150 MG capsule Take 1 capsule (150 mg total) by mouth 2 (two) times daily. 60 capsule 5    senna-docusate 8.6-50 mg (PERICOLACE) 8.6-50 mg per tablet Take 1 tablet by mouth 2 (two) times daily as needed for Constipation. 60 tablet 0    sertraline (ZOLOFT) 100 MG tablet TAKE ONE TABLET BY MOUTH EVERY DAY 90 tablet 3    torsemide (DEMADEX) 20 MG Tab Take 1 tablet (20 mg total) by mouth once daily. 90 tablet 3     No current facility-administered medications for this visit.       REVIEW OF SYSTEMS:    GENERAL:  No weight loss, malaise or fevers.  HEENT:  Negative for frequent or significant headaches.  NECK:  Negative for lumps,  goiter, pain and significant neck swelling.  RESPIRATORY:  Negative for cough, wheezing or shortness of breath.  CARDIOVASCULAR:  Negative for chest pain, leg swelling or palpitations. HTN  GI:  Negative for abdominal discomfort, blood in stools or black stools or change in bowel habits.  MUSCULOSKELETAL:  See HPI.  SKIN:  Negative for lesions, rash, and itching.  ENDO: Diabetes  PSYCH:  Negative for sleep disturbance, mood disorder and recent psychosocial stressors.  HEMATOLOGY/LYMPHOLOGY:  Negative for prolonged bleeding, bruising easily or swollen nodes.  NEURO:   No history of headaches, syncope, paralysis, seizures or tremors.  All other reviewed and negative other than HPI.    Past Medical History:  Past Medical History:   Diagnosis Date    Arthritis     Back pain     Cancer     ovarian    Cervical cancer     Coronary artery disease     Depression     Diabetes mellitus     Fibromyalgia     Heart attack     History of MI (myocardial infarction)     Hyperlipidemia     Hypertension     Lupus     Stroke     slight left sided weakness       Past Surgical History:  Past Surgical History:   Procedure Laterality Date    APPENDECTOMY       SECTION      2    CORONARY ANGIOGRAPHY N/A 2022    Procedure: ANGIOGRAM, CORONARY ARTERY;  Surgeon: Jose L Méndez MD;  Location: [facility] CATH LAB;  Service: Cardiology;  Laterality: N/A;    HYSTERECTOMY      with USO for cervical cancer    INJECTION OF ANESTHETIC AGENT AROUND NERVE Bilateral 2021    Procedure: BLOCK, NERVE, SYMPATHIC;  Surgeon: Holden Pereira MD;  Location: [facility] PAIN MGT;  Service: Pain Management;  Laterality: Bilateral;    INJECTION OF ANESTHETIC AGENT AROUND NERVE N/A 2021    Procedure: BLOCK, NERVE, SYMPATHETIC  need consent;  Surgeon: Holden Pereira MD;  Location: [facility] PAIN MGT;  Service: Pain Management;  Laterality: N/A;    YARED LINK,SWALLOW FUNCTION,CINE/VIDEO RECORD  2021         TONSILLECTOMY      TRIAL OF SPINAL CORD  NERVE STIMULATOR N/A 3/8/2023    Procedure: LUMBAR SPINAL CORD STIMULATOR TRIAL NEVRO REP PATIENT STATES SHE NO LONGER TAKES PLAVIX;  Surgeon: Holden Pereira MD;  Location: [facility];  Service: Pain Management;  Laterality: N/A;       Family History:  Family History   Problem Relation Name Age of Onset    COPD Mother      Lupus Mother      Hernia Mother      Uterine cancer Mother          vs cervical cancer    Ovarian cancer Mother      Diabetes Father      Coronary artery disease Father      Colon cancer Maternal Grandmother          in her 50's       Social History:  Social History     Socioeconomic History    Marital status:    Tobacco Use    Smoking status: Former     Current packs/day: 0.00     Types: Cigarettes     Quit date: 2020     Years since quittin.6     Passive exposure: Past    Smokeless tobacco: Never   Substance and Sexual Activity    Alcohol use: Not Currently     Comment: occasionally    Drug use: Yes     Types: Hydrocodone     Comment: three times a day    Sexual activity: Not Currently   Social History Narrative    Lives with daughter. .      Social Drivers of Health     Financial Resource Strain: Low Risk  (2025)    Overall Financial Resource Strain (CARDIA)     Difficulty of Paying Living Expenses: Not hard at all   Food Insecurity: No Food Insecurity (2025)    Hunger Vital Sign     Worried About Running Out of Food in the Last Year: Never true     Ran Out of Food in the Last Year: Never true   Transportation Needs: No Transportation Needs (2025)    PRAPARE - Transportation     Lack of Transportation (Medical): No     Lack of Transportation (Non-Medical): No   Physical Activity: Insufficiently Active (2025)    Exercise Vital Sign     Days of Exercise per Week: 7 days     Minutes of Exercise per Session: 10 min   Stress: Patient Unable To Answer (2025)    [employer] - Occupational Stress Questionnaire     Feeling  "of Stress : Patient unable to answer   Housing Stability: Low Risk  (5/19/2025)    Housing Stability Vital Sign     Unable to Pay for Housing in the Last Year: No     Number of Times Moved in the Last Year: 0     Homeless in the Last Year: No       OBJECTIVE:    Ht 5' 1" (1.549 m)   Wt 98 kg (216 lb 0.8 oz)   BMI 40.82 kg/m²     PHYSICAL EXAMINATION:     General appearance: Well appearing, in no acute distress, alert and oriented x3.  Psych:  Mood and affect appropriate.  Skin: Skin color, texture, turgor normal, no rashes or lesions, in both upper and lower body.  Head/face:  Atraumatic, normocephalic.  Pulm: Symmetric chest rise, no respiratory distress noted.   Back: Straight leg raise in the sitting position is positive for radicular pain. There is pain with palpation over lumbar facet joints. Limited ROM with pain on flexion and extension.   Musculoskeletal: There is pain with palpation over bilateral SI joints. There is pain with palpation over medial and lateral joint line of left knee. 5/5 strength in right ankle with plantar and dorsiflexion. 5/5 strength in left ankle with plantar and dorsiflexion. 4/5 strength with right knee flexion and extension. 4/5 strength with left knee flexion and extension.   Neuro:  Decreased sensation to light touch to BLE.   Gait: Antalgic- ambulates with wheelchair.     ASSESSMENT: 80 y.o. year old female with chronic pain, consistent with:     1. Chronic pain disorder        2. Painful diabetic neuropathy  capsaicin-skin cleanser patch 4 patch      3. Primary osteoarthritis of both knees              PLAN:     - Previous imaging reviewed. Labs reviewed.     - Schedule for bilateral knee steroid injections in office.     - Qutenza patch as per below.     - Continue Robaxin 500 mg TID PRN.     - Continue Lyrica 150 mg TID.     - Continue Norco 10/325 mg TID PRN, #90, refill already sent.     - The patient is here today for a refill of current pain medications and they believe " these provide effective pain control and improvements in quality of life.  The patient notes no serious side effects, and feels the benefits outweigh the risks.  The patient was reminded of the pain contract that they signed previously as well as the risks and benefits of the medication including possible death.  The updated [place] [employer] prescription monitoring program was reviewed, and the patient has been found to be compliant with current treatment plan.     - UDS from 9/19/2024 reviewed and consistent.      - RTC in 3 months.       The above plan and management options were discussed at length with patient. Patient is in agreement with the above and verbalized understanding.    Elvira Hylton NP  07/01/2025    PATIENT NAME: Indira Waters        MRN: [id number]    Diagnosis: Painful Diabetic Neuropathy E13.42     Postprocedural Diagnosis: Same     Complications: None     Informed Consent:  The patient's condition and proposed procedures, risks (including complications of nerve damage, skin irritation, infection, and failure of pain relief), and alternatives were discussed with the patient or responsible party.  The patient's/responsible party's questions were answered.  The patient/responsible party appeared to understand and chose to proceed.       Procedural Pause:  A procedural pause verifying correct patient, medical record number, allergies, medications to be administered, current vital signs, and proper application site was performed immediately prior to beginning the procedure.     Procedure in Detail:  The patient was placed in a sitting position. 4 patches were used for the procedure today.  The patches were applied to the target area and secured with tape.  A coban was used to further secure the patches in place.  The patient was allowed to rest in a comfortable position, and monitored by staff every 10-15 minutes to ensure comfort. The patches remained in place for 30 minutes.  The  patches were then removed and the cleaning gel was applied and allowed to sit for an additional 60 seconds, after which the area was wiped clean.      The patient was then discharged in stable condition.        DISCUSSION:  A Qutenza patch was applied today with the purpose of treating the patients nerve pain. They were informed that they may have some burning of the skin for a few days, and should not take a hot shower for 2-3 days as that may irritate the area.  They were informed that the area may feel like they had a sunburn. They were informed that it can take about 2-4 weeks for the effects of the patch to be fully realized     Plan to repeat every 91 days.

## 2025-07-03 ENCOUNTER — TELEPHONE (OUTPATIENT)
Dept: INTERNAL MEDICINE | Facility: CLINIC | Age: 80
End: 2025-07-03

## 2025-07-06 DIAGNOSIS — K31.84 GASTROPARESIS: ICD-10-CM

## 2025-07-06 NOTE — TELEPHONE ENCOUNTER
Care Due:                  Date            Visit Type   Department     Provider  --------------------------------------------------------------------------------                                ESTABLISHED                              PATIENT -    [facility] INTERNAL  Last Visit: 04-      [facility]      MEDICINE       Chun Zapata                               -                              PRIMARY      [facility] INTERNAL  Next Visit: 07-      CARE ([facility])   MEDICINE       Chun Zapata                                                            Last  Test          Frequency    Reason                     Performed    Due Date  --------------------------------------------------------------------------------    Uric Acid...  12 months..  allopurinoL..............  06- 06-    [facility] Embedded Care Due Messages. Reference number: [id number].   7/06/2025 8:03:27 AM CDT

## 2025-07-07 ENCOUNTER — EXTERNAL HOME HEALTH (OUTPATIENT)
Dept: HOME HEALTH SERVICES | Facility: HOSPITAL | Age: 80
End: 2025-07-07
Payer: MEDICARE

## 2025-07-07 RX ORDER — METOCLOPRAMIDE 5 MG/1
TABLET ORAL
Qty: 90 TABLET | Refills: 3 | Status: SHIPPED | OUTPATIENT
Start: 2025-07-07

## 2025-07-08 ENCOUNTER — OFFICE VISIT (OUTPATIENT)
Dept: ENDOCRINOLOGY | Facility: CLINIC | Age: 80
End: 2025-07-08
Payer: MEDICARE

## 2025-07-08 ENCOUNTER — TELEPHONE (OUTPATIENT)
Dept: ENDOCRINOLOGY | Facility: CLINIC | Age: 80
End: 2025-07-08

## 2025-07-08 ENCOUNTER — OUTPATIENT CASE MANAGEMENT (OUTPATIENT)
Dept: ADMINISTRATIVE | Facility: OTHER | Age: 80
End: 2025-07-08
Payer: MEDICARE

## 2025-07-08 ENCOUNTER — PATIENT MESSAGE (OUTPATIENT)
Dept: ENDOCRINOLOGY | Facility: CLINIC | Age: 80
End: 2025-07-08

## 2025-07-08 ENCOUNTER — OUTPATIENT CASE MANAGEMENT (OUTPATIENT)
Dept: ADMINISTRATIVE | Facility: OTHER | Age: 80
End: 2025-07-08

## 2025-07-08 DIAGNOSIS — M81.0 AGE-RELATED OSTEOPOROSIS WITHOUT CURRENT PATHOLOGICAL FRACTURE: Primary | ICD-10-CM

## 2025-07-08 DIAGNOSIS — E11.22 TYPE 2 DIABETES MELLITUS WITH CHRONIC KIDNEY DISEASE, WITH LONG-TERM CURRENT USE OF INSULIN, UNSPECIFIED CKD STAGE: Primary | ICD-10-CM

## 2025-07-08 DIAGNOSIS — Z79.4 TYPE 2 DIABETES MELLITUS WITH CHRONIC KIDNEY DISEASE, WITH LONG-TERM CURRENT USE OF INSULIN, UNSPECIFIED CKD STAGE: Primary | ICD-10-CM

## 2025-07-08 DIAGNOSIS — M81.0 AGE-RELATED OSTEOPOROSIS WITHOUT CURRENT PATHOLOGICAL FRACTURE: ICD-10-CM

## 2025-07-08 PROCEDURE — 98006 SYNCH AUDIO-VIDEO EST MOD 30: CPT | Mod: 95,,, | Performed by: NURSE PRACTITIONER

## 2025-07-08 PROCEDURE — G2211 COMPLEX E/M VISIT ADD ON: HCPCS | Mod: 95,,, | Performed by: NURSE PRACTITIONER

## 2025-07-08 RX ORDER — INSULIN PUMP SYRINGE, 3 ML
EACH MISCELLANEOUS
Qty: 1 EACH | Refills: 0 | Status: SHIPPED | OUTPATIENT
Start: 2025-07-08

## 2025-07-08 RX ORDER — GLUCAGON 3 MG/1
3 POWDER NASAL
Qty: 1 EACH | Refills: 1 | Status: SHIPPED | OUTPATIENT
Start: 2025-07-08

## 2025-07-08 RX ORDER — LANCETS
EACH MISCELLANEOUS
Qty: 200 EACH | Refills: 11 | Status: SHIPPED | OUTPATIENT
Start: 2025-07-08

## 2025-07-08 NOTE — ASSESSMENT & PLAN NOTE
-- RTC in 4 months.  -- A1c goal < or = 8%.  -- Medications discussed:  MFM - CKD  Insulin   -- Reviewed logs/CGM:  Glucose variable.  Instructed to send glucose logs in 7-14 days.  Reach out to me sooner for any glucose <70 or consistently >200.  -- Asael ordered and discussed use.  -- Medication Changes:   Lantus 26 units in AM and 26 units in PM   Humalog 13-13-11 units plus correction scale before meals only  Add correction scale as needed.  Blood sugar 200 to 250 add 1 units  Blood sugar 251 to 300 add 2 units  Blood sugar greater than 301 add 3 units  If glucose < 150 she will give 1/2 a dose.     -- Reviewed goals of therapy are to get the best control we can without hypoglycemia.  -- Reviewed patient's current insulin regimen. Clarified proper insulin dose and timing in relation to meals, etc. Insulin injection sites and proper rotation instructed.    -- Advised frequent self blood glucose monitoring.  Patient encouraged to document glucose results and bring them to every clinic visit.  -- Hypoglycemia precautions discussed. Instructed on precautions before driving.    -- Call for Bg repeatedly < 90 or > 180.   -- Close adherence to lifestyle changes recommended.   -- Periodic follow ups for eye evaluations, foot care and dental care suggested.

## 2025-07-08 NOTE — ASSESSMENT & PLAN NOTE
-- Risks include  race, menopause, history of smoking,  +FH, PPI therapy.  -- Reviewed basics of quantity versus quality.  -- Reassuring they are not fracturing.  -- Recommend:  -Pharmacological therapy is recommended for patients with osteopenia if the 10 year probability of a hip fracture is >3% and 10 year probability of other major osteoporotic fractures is >20%.  Treatment options and potential side effects discussed for PO bisphosphonates, reclast, Denosumab, and Teriparatide.   -Treatment:   Forteo   Start 10/2022  Stop 10/2024  LOV - plan was to start Prolia - not sure why this was not schedule. Will resubmit order for Prolia in clinic with clinical pharmacsit.   -Calcium and Vitamin D RDD provided.  -Exercise: recommended.  -Fall precautions made in the home.  -Alerted that if dental work needs to be done it should be done prior to initiating therapy.   -- Repeat DEXA scan 1/2027.  -- Reviewed BMD - stable.

## 2025-07-08 NOTE — Clinical Note
VV in 6 and 12 weeks Labs in 12 weeks  Orders Placed This Encounter     Comprehensive Metabolic Panel     Hemoglobin A1C     Vitamin D

## 2025-07-08 NOTE — PROGRESS NOTES
Outpatient Care Management  Plan of Care Follow Up Visit    Patient: Indira Waters  MRN: [id number]  Date of Service: 07/08/2025  Completed by: Madina Malhotra RN  Referral Date: 03/31/2025    Reason for Visit   Patient presents with    OPCM RN Follow Up Call       Brief Summary:   MEMBER'S CURRENT STATUS  :  -S&S of CKD:  Patient's daughter, Esha, states that she cannot remember the signs and symptoms of CKD at this time.      -Low sodium diet and fluids: Esha states the patient is still following a low sodium.  She states she does a lot of the cooking for the patient and she does not cook with salt.  She states the patient used to love fried pork chops but she stopped frying foods for the patient.      -Comorbid risk factors:  That patient's blood pressure has ranged from 115//60.  Esha states the patient's blood sugars have ranged from 110-288.  Esha states they have had trouble with Dexcom sensor failures.  She states one morning that the Dexcom read 55 and when they checked the patient's blood sugar with the glucometer, it said 177.  She states when the patient's sugar really does get low, they treat it with a cup of juice.    -S&S of Anemia:  Patient's daughter, Esha, states that she does not remember the S&S of anemia.  She states that to check for paleness of the patient, she should pull the bottom eyelid down to see if the membrane is pink.    -Diet:  Esha states that she is still trying to give the patient her iron in her diet because she tried taking an iron pill at one time but the patient could not tolerate it.  She states the patient eats things like liver other meats that are high in iron.          INTERVENTIONS  :  -S&S of CKD:  CM reiterated the signs and symptoms of CKD including anorexia; fatigue; weakness; difficulty sleeping; confusion; frequent urination, especially at night; muscle cramps at night; edema; periorbital edema, especially in the morning; nausea and  vomiting; itchy skin (pruritus); weight gain or decreased urine output.     -Low sodium diet and fluids:  CM educated the patient on the plate method in great detail informing the patient that they should fill half the plate with non starchy vegetables, 1/4 of a plate of carbohydrates, and 1/4 of a plate of protein.  CM informed the patient that the carbohydrates should be 30-45 gm per meal and the protein should be about the size of a fist or a deck of cards.  CM stressed that the patient can visualize the plate method on page 16 of the Diabetes Management Guide and the list of non starchy vegetables on page 11.     -Comorbid risk factors:  CM stressed to Esha that the patient's blood sugar should range from 100-180 and that 288 is too high of blood sugars, even after a meal.   CM encouraged Esha to talk to Giuliana Montero from [facility] about the Dexcom problems they are having and how often she should calibrate the Dexcom.     -S&S of Anemia:  CM reiterated signs and symptoms of anemia including fatigue, Breathlessness, tachypnea, tachycardia, cold hands and feet, dizziness, headache, pale skin, brittle nails and craving unusual food.      -Diet:  CM commended Esha for continuing to try to give her as much iron in the diet as possible.          Next steps:   Follow up on S&S of CKD  Follow up on S&S of Anemia  Follow up on fluid restriction 8563-0522 ml  Follow up on what BP and BS running  Follow up on Dexcom sensor problems and if spoke to Giuliana Montero NP in Endo  Follow up on how often to calibrate Dexcom from Giuliana

## 2025-07-09 ENCOUNTER — TELEPHONE (OUTPATIENT)
Dept: ENDOCRINOLOGY | Facility: CLINIC | Age: 80
End: 2025-07-09
Payer: MEDICARE

## 2025-07-11 ENCOUNTER — PATIENT MESSAGE (OUTPATIENT)
Dept: ENDOCRINOLOGY | Facility: CLINIC | Age: 80
End: 2025-07-11
Payer: MEDICARE

## 2025-07-11 DIAGNOSIS — I25.10 CORONARY ARTERY CALCIFICATION: ICD-10-CM

## 2025-07-11 DIAGNOSIS — I50.43 ACUTE ON CHRONIC COMBINED SYSTOLIC AND DIASTOLIC CONGESTIVE HEART FAILURE: ICD-10-CM

## 2025-07-11 DIAGNOSIS — I25.118 CORONARY ARTERY DISEASE OF NATIVE ARTERY OF NATIVE HEART WITH STABLE ANGINA PECTORIS: ICD-10-CM

## 2025-07-11 DIAGNOSIS — E78.01 FAMILIAL HYPERCHOLESTEROLEMIA: ICD-10-CM

## 2025-07-11 RX ORDER — EVOLOCUMAB 140 MG/ML
140 INJECTION, SOLUTION SUBCUTANEOUS
Qty: 2 EACH | Refills: 11 | Status: ACTIVE | OUTPATIENT
Start: 2025-07-11

## 2025-07-13 RX ORDER — TORSEMIDE 20 MG/1
20 TABLET ORAL
Qty: 90 TABLET | Refills: 3 | Status: ON HOLD | OUTPATIENT
Start: 2025-07-13

## 2025-07-14 ENCOUNTER — TELEPHONE (OUTPATIENT)
Dept: INTERNAL MEDICINE | Facility: CLINIC | Age: 80
End: 2025-07-14
Payer: MEDICARE

## 2025-07-14 NOTE — TELEPHONE ENCOUNTER
----- Message from Chun Zapata MD sent at 7/14/2025 10:46 AM CDT -----  Please call    Kidneys are under stress again, perhaps less water lately?  Diabetes is worse lately also.  Perhaps something else is going on?  Perhaps a urinary tract infection?    Please recheck kidney function within a week or so and check urine. Be sure to wipe three times with a cleansing towelette, then urine some into the toilet before urinating into the cup.  Tricky, I   know.    Sincerely,  ZUHAIR Zapata MD  ----- Message -----  From: Lab, Background User  Sent: 7/1/2025   4:48 PM CDT  To: Chun Zapata MD

## 2025-07-14 NOTE — TELEPHONE ENCOUNTER
No care due was identified.  [facility] Embedded Care Due Messages. Reference number: [id number].   7/11/2025 8:03:32 AM CDT  
Refill Decision Note   Indira Waters  is requesting a refill authorization.  Brief Assessment and Rationale for Refill:  Approve     Medication Therapy Plan:         Pharmacist review requested: Yes   Extended chart review required: Yes   Comments:     Note composed:7:32 PM 07/13/2025               
Refill Routing Note   Medication(s) are not appropriate for processing by [facility] for the following reason(s):        Required labs abnormal    [facility] action(s):  Defer           Pharmacist review requested: Yes     Appointments  past 12m or future 3m with PCP    Date Provider   Last Visit   4/15/2025 Chun Zapata MD   Next Visit   7/16/2025 Chun Zapata MD   ED visits in past 90 days: 0        Note composed:9:57 PM 07/12/2025           
no

## 2025-07-15 ENCOUNTER — LAB VISIT (OUTPATIENT)
Dept: LAB | Facility: OTHER | Age: 80
End: 2025-07-15
Attending: STUDENT IN AN ORGANIZED HEALTH CARE EDUCATION/TRAINING PROGRAM
Payer: MEDICARE

## 2025-07-15 DIAGNOSIS — M81.0 AGE-RELATED OSTEOPOROSIS WITHOUT CURRENT PATHOLOGICAL FRACTURE: ICD-10-CM

## 2025-07-15 DIAGNOSIS — E09.22: ICD-10-CM

## 2025-07-15 DIAGNOSIS — N18.4 STAGE 4 CHRONIC KIDNEY DISEASE: ICD-10-CM

## 2025-07-15 DIAGNOSIS — N17.9 AKI (ACUTE KIDNEY INJURY): ICD-10-CM

## 2025-07-15 LAB
ALBUMIN SERPL BCP-MCNC: 3.7 G/DL (ref 3.5–5.2)
ANION GAP (OHS): 14 MMOL/L (ref 8–16)
BUN SERPL-MCNC: 30 MG/DL (ref 8–23)
CALCIUM SERPL-MCNC: 8.9 MG/DL (ref 8.7–10.5)
CHLORIDE SERPL-SCNC: 101 MMOL/L (ref 95–110)
CO2 SERPL-SCNC: 23 MMOL/L (ref 23–29)
CREAT SERPL-MCNC: 1.5 MG/DL (ref 0.5–1.4)
GFR SERPLBLD CREATININE-BSD FMLA CKD-EPI: 35 ML/MIN/1.73/M2
GLUCOSE SERPL-MCNC: 270 MG/DL (ref 70–110)
PHOSPHATE SERPL-MCNC: 3.6 MG/DL (ref 2.7–4.5)
POTASSIUM SERPL-SCNC: 4.6 MMOL/L (ref 3.5–5.1)
SODIUM SERPL-SCNC: 138 MMOL/L (ref 136–145)

## 2025-07-15 PROCEDURE — 80069 RENAL FUNCTION PANEL: CPT

## 2025-07-15 PROCEDURE — 87086 URINE CULTURE/COLONY COUNT: CPT

## 2025-07-15 PROCEDURE — 36415 COLL VENOUS BLD VENIPUNCTURE: CPT

## 2025-07-16 ENCOUNTER — OCHSNER VIRTUAL EMERGENCY DEPARTMENT (OUTPATIENT)
Facility: CLINIC | Age: 80
End: 2025-07-16
Payer: MEDICARE

## 2025-07-16 ENCOUNTER — HOSPITAL ENCOUNTER (INPATIENT)
Facility: OTHER | Age: 80
LOS: 5 days | Discharge: HOME HEALTH | DRG: 947 | End: 2025-07-22
Attending: EMERGENCY MEDICINE | Admitting: INTERNAL MEDICINE
Payer: MEDICARE

## 2025-07-16 ENCOUNTER — PATIENT OUTREACH (OUTPATIENT)
Facility: OTHER | Age: 80
End: 2025-07-16
Payer: MEDICARE

## 2025-07-16 ENCOUNTER — OFFICE VISIT (OUTPATIENT)
Dept: INTERNAL MEDICINE | Facility: CLINIC | Age: 80
End: 2025-07-16
Payer: MEDICARE

## 2025-07-16 VITALS — HEART RATE: 77 BPM | HEIGHT: 61 IN | BODY MASS INDEX: 40.82 KG/M2 | OXYGEN SATURATION: 96 %

## 2025-07-16 DIAGNOSIS — R53.1 WEAKNESS: ICD-10-CM

## 2025-07-16 DIAGNOSIS — M62.85 DYSFUNCTION OF THE MULTIFIDUS MUSCLE OF LUMBAR REGION: ICD-10-CM

## 2025-07-16 DIAGNOSIS — E11.42 DIABETIC POLYNEUROPATHY ASSOCIATED WITH TYPE 2 DIABETES MELLITUS: Chronic | ICD-10-CM

## 2025-07-16 DIAGNOSIS — I50.40 COMBINED SYSTOLIC AND DIASTOLIC CONGESTIVE HEART FAILURE, UNSPECIFIED HF CHRONICITY: ICD-10-CM

## 2025-07-16 DIAGNOSIS — N17.9 AKI (ACUTE KIDNEY INJURY): ICD-10-CM

## 2025-07-16 DIAGNOSIS — M79.2 NEUROPATHIC PAIN: ICD-10-CM

## 2025-07-16 DIAGNOSIS — I10 ESSENTIAL HYPERTENSION: Chronic | ICD-10-CM

## 2025-07-16 DIAGNOSIS — E11.40 PAINFUL DIABETIC NEUROPATHY: ICD-10-CM

## 2025-07-16 DIAGNOSIS — N20.0 NEPHROLITHIASIS: ICD-10-CM

## 2025-07-16 DIAGNOSIS — J44.9 CAFL (CHRONIC AIRFLOW LIMITATION): ICD-10-CM

## 2025-07-16 DIAGNOSIS — J96.11 CHRONIC RESPIRATORY FAILURE WITH HYPOXIA, ON HOME O2 THERAPY: ICD-10-CM

## 2025-07-16 DIAGNOSIS — N18.30 STAGE 3 CHRONIC KIDNEY DISEASE, UNSPECIFIED WHETHER STAGE 3A OR 3B CKD: ICD-10-CM

## 2025-07-16 DIAGNOSIS — I13.0 HYPERTENSIVE HEART AND CHRONIC KIDNEY DISEASE WITH HEART FAILURE AND STAGE 1 THROUGH STAGE 4 CHRONIC KIDNEY DISEASE, OR UNSPECIFIED CHRONIC KIDNEY DISEASE: ICD-10-CM

## 2025-07-16 DIAGNOSIS — F32.A DEPRESSION, UNSPECIFIED DEPRESSION TYPE: ICD-10-CM

## 2025-07-16 DIAGNOSIS — R53.1 GENERALIZED WEAKNESS: ICD-10-CM

## 2025-07-16 DIAGNOSIS — Z86.73 HISTORY OF CVA (CEREBROVASCULAR ACCIDENT): ICD-10-CM

## 2025-07-16 DIAGNOSIS — I25.118 CORONARY ARTERY DISEASE OF NATIVE ARTERY OF NATIVE HEART WITH STABLE ANGINA PECTORIS: ICD-10-CM

## 2025-07-16 DIAGNOSIS — N18.9 CHRONIC KIDNEY DISEASE, UNSPECIFIED CKD STAGE: ICD-10-CM

## 2025-07-16 DIAGNOSIS — I69.354 HEMIPLEGIA AND HEMIPARESIS FOLLOWING CEREBRAL INFARCTION AFFECTING LEFT NON-DOMINANT SIDE: ICD-10-CM

## 2025-07-16 DIAGNOSIS — I50.42 CHRONIC COMBINED SYSTOLIC AND DIASTOLIC CONGESTIVE HEART FAILURE: ICD-10-CM

## 2025-07-16 DIAGNOSIS — Z99.81 CHRONIC RESPIRATORY FAILURE WITH HYPOXIA, ON HOME O2 THERAPY: ICD-10-CM

## 2025-07-16 DIAGNOSIS — R53.83 FATIGUE, UNSPECIFIED TYPE: ICD-10-CM

## 2025-07-16 DIAGNOSIS — Z85.41 HISTORY OF CERVICAL CANCER: ICD-10-CM

## 2025-07-16 DIAGNOSIS — N18.32 STAGE 3B CHRONIC KIDNEY DISEASE: ICD-10-CM

## 2025-07-16 DIAGNOSIS — E78.49 OTHER HYPERLIPIDEMIA: ICD-10-CM

## 2025-07-16 DIAGNOSIS — J44.9 CHRONIC OBSTRUCTIVE PULMONARY DISEASE, UNSPECIFIED COPD TYPE: ICD-10-CM

## 2025-07-16 DIAGNOSIS — Z91.199 NONCOMPLIANCE: ICD-10-CM

## 2025-07-16 DIAGNOSIS — E11.22 TYPE 2 DIABETES MELLITUS WITH CHRONIC KIDNEY DISEASE, WITH LONG-TERM CURRENT USE OF INSULIN, UNSPECIFIED CKD STAGE: ICD-10-CM

## 2025-07-16 DIAGNOSIS — I25.10 ATHSCL HEART DISEASE OF NATIVE CORONARY ARTERY W/O ANG PCTRS: ICD-10-CM

## 2025-07-16 DIAGNOSIS — R04.2 HEMOPTYSIS: ICD-10-CM

## 2025-07-16 DIAGNOSIS — Z79.4 TYPE 2 DIABETES MELLITUS WITH CHRONIC KIDNEY DISEASE, WITH LONG-TERM CURRENT USE OF INSULIN, UNSPECIFIED CKD STAGE: ICD-10-CM

## 2025-07-16 DIAGNOSIS — J96.11 CHRONIC RESPIRATORY FAILURE WITH HYPOXIA: ICD-10-CM

## 2025-07-16 DIAGNOSIS — N17.9 AKI (ACUTE KIDNEY INJURY): Primary | ICD-10-CM

## 2025-07-16 DIAGNOSIS — Z87.891 FORMER SMOKER: ICD-10-CM

## 2025-07-16 DIAGNOSIS — R30.0 DYSURIA: ICD-10-CM

## 2025-07-16 DIAGNOSIS — M17.0 PRIMARY OSTEOARTHRITIS OF BOTH KNEES: ICD-10-CM

## 2025-07-16 DIAGNOSIS — N18.4 STAGE 4 CHRONIC KIDNEY DISEASE: ICD-10-CM

## 2025-07-16 DIAGNOSIS — Z71.89 ADVANCED CARE PLANNING/COUNSELING DISCUSSION: ICD-10-CM

## 2025-07-16 DIAGNOSIS — G89.4 CHRONIC PAIN SYNDROME: ICD-10-CM

## 2025-07-16 DIAGNOSIS — E55.9 VITAMIN D DEFICIENCY: ICD-10-CM

## 2025-07-16 DIAGNOSIS — R53.1 GENERAL WEAKNESS: ICD-10-CM

## 2025-07-16 DIAGNOSIS — M79.7 FIBROMYALGIA: Chronic | ICD-10-CM

## 2025-07-16 DIAGNOSIS — Z99.81 ON HOME OXYGEN THERAPY: ICD-10-CM

## 2025-07-16 DIAGNOSIS — K21.9 GASTROESOPHAGEAL REFLUX DISEASE WITHOUT ESOPHAGITIS: Chronic | ICD-10-CM

## 2025-07-16 DIAGNOSIS — I70.0 AORTIC ATHEROSCLEROSIS: ICD-10-CM

## 2025-07-16 DIAGNOSIS — I77.1 TORTUOUS AORTA: ICD-10-CM

## 2025-07-16 DIAGNOSIS — F33.40 RECURRENT MAJOR DEPRESSIVE DISORDER, IN REMISSION: ICD-10-CM

## 2025-07-16 DIAGNOSIS — J96.21 ACUTE ON CHRONIC RESPIRATORY FAILURE WITH HYPOXIA: Primary | ICD-10-CM

## 2025-07-16 DIAGNOSIS — D69.2 SENILE PURPURA: ICD-10-CM

## 2025-07-16 DIAGNOSIS — E78.01 FAMILIAL HYPERCHOLESTEROLEMIA: Chronic | ICD-10-CM

## 2025-07-16 DIAGNOSIS — R53.81 MALAISE: ICD-10-CM

## 2025-07-16 DIAGNOSIS — F41.1 GENERALIZED ANXIETY DISORDER: ICD-10-CM

## 2025-07-16 DIAGNOSIS — R94.31 ABNORMAL ELECTROCARDIOGRAM: ICD-10-CM

## 2025-07-16 DIAGNOSIS — Z87.442 HISTORY OF RENAL CALCULI: ICD-10-CM

## 2025-07-16 DIAGNOSIS — M81.0 AGE-RELATED OSTEOPOROSIS WITHOUT CURRENT PATHOLOGICAL FRACTURE: ICD-10-CM

## 2025-07-16 DIAGNOSIS — M54.15 RADICULOPATHY OF THORACOLUMBAR REGION: ICD-10-CM

## 2025-07-16 DIAGNOSIS — D69.0 IGA MEDIATED LEUKOCYTOCLASTIC VASCULITIS: ICD-10-CM

## 2025-07-16 DIAGNOSIS — I25.10 CORONARY ARTERY CALCIFICATION: Chronic | ICD-10-CM

## 2025-07-16 DIAGNOSIS — I50.22 CHRONIC SYSTOLIC (CONGESTIVE) HEART FAILURE: ICD-10-CM

## 2025-07-16 DIAGNOSIS — M17.11 PRIMARY OSTEOARTHRITIS OF RIGHT KNEE: ICD-10-CM

## 2025-07-16 DIAGNOSIS — R53.83 FATIGUE, UNSPECIFIED TYPE: Primary | ICD-10-CM

## 2025-07-16 DIAGNOSIS — E66.812 CLASS 2 OBESITY WITH BODY MASS INDEX (BMI) OF 39.0 TO 39.9 IN ADULT, UNSPECIFIED OBESITY TYPE, UNSPECIFIED WHETHER SERIOUS COMORBIDITY PRESENT: ICD-10-CM

## 2025-07-16 DIAGNOSIS — J41.1 MUCOPURULENT CHRONIC BRONCHITIS: ICD-10-CM

## 2025-07-16 DIAGNOSIS — J42 CHRONIC BRONCHITIS, UNSPECIFIED CHRONIC BRONCHITIS TYPE: ICD-10-CM

## 2025-07-16 PROBLEM — R11.2 NAUSEA AND VOMITING: Status: ACTIVE | Noted: 2025-07-16

## 2025-07-16 LAB
ABSOLUTE EOSINOPHIL (OHS): 0.48 K/UL
ABSOLUTE MONOCYTE (OHS): 0.98 K/UL (ref 0.3–1)
ABSOLUTE NEUTROPHIL COUNT (OHS): 8.35 K/UL (ref 1.8–7.7)
ALBUMIN SERPL BCP-MCNC: 3.8 G/DL (ref 3.5–5.2)
ALP SERPL-CCNC: 60 UNIT/L (ref 40–150)
ALT SERPL W/O P-5'-P-CCNC: 15 UNIT/L (ref 10–44)
ANION GAP (OHS): 16 MMOL/L (ref 8–16)
AST SERPL-CCNC: 25 UNIT/L (ref 11–45)
BACTERIA UR CULT: ABNORMAL
BASOPHILS # BLD AUTO: 0.07 K/UL
BASOPHILS NFR BLD AUTO: 0.6 %
BILIRUB SERPL-MCNC: 0.2 MG/DL (ref 0.1–1)
BILIRUB UR QL STRIP.AUTO: NEGATIVE
BNP SERPL-MCNC: 158 PG/ML (ref 0–99)
BUN SERPL-MCNC: 31 MG/DL (ref 8–23)
CALCIUM SERPL-MCNC: 9.7 MG/DL (ref 8.7–10.5)
CHLORIDE SERPL-SCNC: 101 MMOL/L (ref 95–110)
CLARITY UR: CLEAR
CO2 SERPL-SCNC: 22 MMOL/L (ref 23–29)
COLOR UR AUTO: COLORLESS
CREAT SERPL-MCNC: 1.4 MG/DL (ref 0.5–1.4)
CREAT UR-MCNC: 19.9 MG/DL (ref 15–325)
CREAT UR-MCNC: 20 MG/DL (ref 15–325)
ERYTHROCYTE [DISTWIDTH] IN BLOOD BY AUTOMATED COUNT: 15.6 % (ref 11.5–14.5)
GFR SERPLBLD CREATININE-BSD FMLA CKD-EPI: 38 ML/MIN/1.73/M2
GLUCOSE SERPL-MCNC: 205 MG/DL (ref 70–110)
GLUCOSE UR QL STRIP: NEGATIVE
HCT VFR BLD AUTO: 32.9 % (ref 37–48.5)
HCV AB SERPL QL IA: NEGATIVE
HGB BLD-MCNC: 10.7 GM/DL (ref 12–16)
HGB UR QL STRIP: NEGATIVE
HIV 1+2 AB+HIV1 P24 AG SERPL QL IA: NEGATIVE
HOLD SPECIMEN: 1
IMM GRANULOCYTES # BLD AUTO: 0.03 K/UL (ref 0–0.04)
IMM GRANULOCYTES NFR BLD AUTO: 0.2 % (ref 0–0.5)
KETONES UR QL STRIP: NEGATIVE
LEUKOCYTE ESTERASE UR QL STRIP: NEGATIVE
LYMPHOCYTES # BLD AUTO: 2.73 K/UL (ref 1–4.8)
MAGNESIUM SERPL-MCNC: 2 MG/DL (ref 1.6–2.6)
MCH RBC QN AUTO: 27.9 PG (ref 27–31)
MCHC RBC AUTO-ENTMCNC: 32.5 G/DL (ref 32–36)
MCV RBC AUTO: 86 FL (ref 82–98)
NITRITE UR QL STRIP: NEGATIVE
NUCLEATED RBC (/100WBC) (OHS): 0 /100 WBC
OSMOLALITY UR: 256 MOSM/KG (ref 50–1200)
PH UR STRIP: 7 [PH]
PLATELET # BLD AUTO: 319 K/UL (ref 150–450)
PMV BLD AUTO: 11.7 FL (ref 9.2–12.9)
POCT GLUCOSE: 209 MG/DL (ref 70–110)
POCT GLUCOSE: 291 MG/DL (ref 70–110)
POTASSIUM SERPL-SCNC: 4.7 MMOL/L (ref 3.5–5.1)
PROT SERPL-MCNC: 7.8 GM/DL (ref 6–8.4)
PROT UR QL STRIP: NEGATIVE
PROT UR-MCNC: <7 MG/DL
PROT/CREAT UR: NORMAL MG/G{CREAT}
RBC # BLD AUTO: 3.83 M/UL (ref 4–5.4)
RELATIVE EOSINOPHIL (OHS): 3.8 %
RELATIVE LYMPHOCYTE (OHS): 21.6 % (ref 18–48)
RELATIVE MONOCYTE (OHS): 7.8 % (ref 4–15)
RELATIVE NEUTROPHIL (OHS): 66 % (ref 38–73)
SODIUM SERPL-SCNC: 139 MMOL/L (ref 136–145)
SP GR UR STRIP: 1.01
TROPONIN I SERPL DL<=0.01 NG/ML-MCNC: 0.01 NG/ML
TSH SERPL-ACNC: 1.55 UIU/ML (ref 0.4–4)
UROBILINOGEN UR STRIP-ACNC: NEGATIVE EU/DL
WBC # BLD AUTO: 12.64 K/UL (ref 3.9–12.7)

## 2025-07-16 PROCEDURE — 96372 THER/PROPH/DIAG INJ SC/IM: CPT | Performed by: NURSE PRACTITIONER

## 2025-07-16 PROCEDURE — 99999 PR PBB SHADOW E&M-EST. PATIENT-LVL IV: CPT | Mod: PBBFAC,,, | Performed by: STUDENT IN AN ORGANIZED HEALTH CARE EDUCATION/TRAINING PROGRAM

## 2025-07-16 PROCEDURE — 94761 N-INVAS EAR/PLS OXIMETRY MLT: CPT

## 2025-07-16 PROCEDURE — 25000003 PHARM REV CODE 250: Performed by: NURSE PRACTITIONER

## 2025-07-16 PROCEDURE — 83935 ASSAY OF URINE OSMOLALITY: CPT | Performed by: NURSE PRACTITIONER

## 2025-07-16 PROCEDURE — 27000221 HC OXYGEN, UP TO 24 HOURS

## 2025-07-16 PROCEDURE — 84300 ASSAY OF URINE SODIUM: CPT | Performed by: NURSE PRACTITIONER

## 2025-07-16 PROCEDURE — 84443 ASSAY THYROID STIM HORMONE: CPT | Performed by: NURSE PRACTITIONER

## 2025-07-16 PROCEDURE — 82570 ASSAY OF URINE CREATININE: CPT | Mod: 59 | Performed by: NURSE PRACTITIONER

## 2025-07-16 PROCEDURE — 99900035 HC TECH TIME PER 15 MIN (STAT)

## 2025-07-16 PROCEDURE — G0378 HOSPITAL OBSERVATION PER HR: HCPCS

## 2025-07-16 PROCEDURE — 93005 ELECTROCARDIOGRAM TRACING: CPT

## 2025-07-16 PROCEDURE — 82570 ASSAY OF URINE CREATININE: CPT | Performed by: NURSE PRACTITIONER

## 2025-07-16 PROCEDURE — 84484 ASSAY OF TROPONIN QUANT: CPT | Performed by: NURSE PRACTITIONER

## 2025-07-16 PROCEDURE — 81003 URINALYSIS AUTO W/O SCOPE: CPT | Performed by: NURSE PRACTITIONER

## 2025-07-16 PROCEDURE — 83735 ASSAY OF MAGNESIUM: CPT | Performed by: NURSE PRACTITIONER

## 2025-07-16 PROCEDURE — 87389 HIV-1 AG W/HIV-1&-2 AB AG IA: CPT | Performed by: EMERGENCY MEDICINE

## 2025-07-16 PROCEDURE — 93010 ELECTROCARDIOGRAM REPORT: CPT | Mod: ,,, | Performed by: INTERNAL MEDICINE

## 2025-07-16 PROCEDURE — 86803 HEPATITIS C AB TEST: CPT | Performed by: EMERGENCY MEDICINE

## 2025-07-16 PROCEDURE — 85025 COMPLETE CBC W/AUTO DIFF WBC: CPT | Performed by: NURSE PRACTITIONER

## 2025-07-16 PROCEDURE — 94799 UNLISTED PULMONARY SVC/PX: CPT

## 2025-07-16 PROCEDURE — 99214 OFFICE O/P EST MOD 30 MIN: CPT | Mod: PBBFAC,25 | Performed by: STUDENT IN AN ORGANIZED HEALTH CARE EDUCATION/TRAINING PROGRAM

## 2025-07-16 PROCEDURE — 63600175 PHARM REV CODE 636 W HCPCS: Performed by: NURSE PRACTITIONER

## 2025-07-16 PROCEDURE — 80053 COMPREHEN METABOLIC PANEL: CPT | Performed by: NURSE PRACTITIONER

## 2025-07-16 PROCEDURE — 83880 ASSAY OF NATRIURETIC PEPTIDE: CPT | Performed by: NURSE PRACTITIONER

## 2025-07-16 PROCEDURE — 99285 EMERGENCY DEPT VISIT HI MDM: CPT | Mod: 25,27

## 2025-07-16 RX ORDER — POLYETHYLENE GLYCOL 3350 17 G/17G
17 POWDER, FOR SOLUTION ORAL 2 TIMES DAILY PRN
Status: DISCONTINUED | OUTPATIENT
Start: 2025-07-16 | End: 2025-07-20

## 2025-07-16 RX ORDER — CIPROFLOXACIN 2 MG/ML
400 INJECTION, SOLUTION INTRAVENOUS
Status: DISCONTINUED | OUTPATIENT
Start: 2025-07-16 | End: 2025-07-17

## 2025-07-16 RX ORDER — SODIUM CHLORIDE 0.9 % (FLUSH) 0.9 %
10 SYRINGE (ML) INJECTION
Status: DISCONTINUED | OUTPATIENT
Start: 2025-07-16 | End: 2025-07-16

## 2025-07-16 RX ORDER — HYDRALAZINE HYDROCHLORIDE 20 MG/ML
10 INJECTION INTRAMUSCULAR; INTRAVENOUS EVERY 6 HOURS PRN
Status: DISCONTINUED | OUTPATIENT
Start: 2025-07-16 | End: 2025-07-22 | Stop reason: HOSPADM

## 2025-07-16 RX ORDER — SODIUM CHLORIDE 9 MG/ML
1000 INJECTION, SOLUTION INTRAVENOUS
Status: COMPLETED | OUTPATIENT
Start: 2025-07-16 | End: 2025-07-16

## 2025-07-16 RX ORDER — HEPARIN SODIUM 5000 [USP'U]/ML
5000 INJECTION, SOLUTION INTRAVENOUS; SUBCUTANEOUS EVERY 8 HOURS
Status: DISCONTINUED | OUTPATIENT
Start: 2025-07-16 | End: 2025-07-22 | Stop reason: HOSPADM

## 2025-07-16 RX ORDER — HYDROCODONE BITARTRATE AND ACETAMINOPHEN 5; 325 MG/1; MG/1
1 TABLET ORAL EVERY 4 HOURS PRN
Refills: 0 | Status: DISCONTINUED | OUTPATIENT
Start: 2025-07-16 | End: 2025-07-19

## 2025-07-16 RX ORDER — HYDROXYZINE HYDROCHLORIDE 25 MG/1
25 TABLET, FILM COATED ORAL EVERY 4 HOURS PRN
Status: DISCONTINUED | OUTPATIENT
Start: 2025-07-16 | End: 2025-07-22 | Stop reason: HOSPADM

## 2025-07-16 RX ORDER — INSULIN ASPART 100 [IU]/ML
13 INJECTION, SOLUTION INTRAVENOUS; SUBCUTANEOUS
Status: DISCONTINUED | OUTPATIENT
Start: 2025-07-17 | End: 2025-07-17

## 2025-07-16 RX ORDER — INSULIN GLARGINE 100 [IU]/ML
13 INJECTION, SOLUTION SUBCUTANEOUS 2 TIMES DAILY
Status: DISCONTINUED | OUTPATIENT
Start: 2025-07-16 | End: 2025-07-17

## 2025-07-16 RX ORDER — TALC
6 POWDER (GRAM) TOPICAL NIGHTLY PRN
Status: DISCONTINUED | OUTPATIENT
Start: 2025-07-16 | End: 2025-07-16

## 2025-07-16 RX ORDER — INSULIN ASPART 100 [IU]/ML
0-5 INJECTION, SOLUTION INTRAVENOUS; SUBCUTANEOUS
Status: DISCONTINUED | OUTPATIENT
Start: 2025-07-16 | End: 2025-07-20

## 2025-07-16 RX ORDER — ONDANSETRON 8 MG/1
8 TABLET, ORALLY DISINTEGRATING ORAL EVERY 6 HOURS PRN
Status: DISCONTINUED | OUTPATIENT
Start: 2025-07-16 | End: 2025-07-22 | Stop reason: HOSPADM

## 2025-07-16 RX ORDER — SODIUM CHLORIDE 0.9 % (FLUSH) 0.9 %
10 SYRINGE (ML) INJECTION
Status: DISCONTINUED | OUTPATIENT
Start: 2025-07-16 | End: 2025-07-22 | Stop reason: HOSPADM

## 2025-07-16 RX ORDER — SERTRALINE HYDROCHLORIDE 100 MG/1
100 TABLET, FILM COATED ORAL DAILY
Status: DISCONTINUED | OUTPATIENT
Start: 2025-07-17 | End: 2025-07-22 | Stop reason: HOSPADM

## 2025-07-16 RX ORDER — GLUCAGON 1 MG
1 KIT INJECTION
Status: DISCONTINUED | OUTPATIENT
Start: 2025-07-16 | End: 2025-07-22 | Stop reason: HOSPADM

## 2025-07-16 RX ORDER — ALLOPURINOL 300 MG/1
300 TABLET ORAL DAILY
Status: DISCONTINUED | OUTPATIENT
Start: 2025-07-17 | End: 2025-07-22 | Stop reason: HOSPADM

## 2025-07-16 RX ORDER — IPRATROPIUM BROMIDE AND ALBUTEROL SULFATE 2.5; .5 MG/3ML; MG/3ML
3 SOLUTION RESPIRATORY (INHALATION) EVERY 4 HOURS PRN
Status: DISCONTINUED | OUTPATIENT
Start: 2025-07-16 | End: 2025-07-22 | Stop reason: HOSPADM

## 2025-07-16 RX ORDER — AMOXICILLIN 250 MG
1 CAPSULE ORAL 2 TIMES DAILY
Status: DISCONTINUED | OUTPATIENT
Start: 2025-07-16 | End: 2025-07-22 | Stop reason: HOSPADM

## 2025-07-16 RX ORDER — METOPROLOL SUCCINATE 50 MG/1
100 TABLET, EXTENDED RELEASE ORAL DAILY
Status: DISCONTINUED | OUTPATIENT
Start: 2025-07-17 | End: 2025-07-22 | Stop reason: HOSPADM

## 2025-07-16 RX ORDER — TALC
6 POWDER (GRAM) TOPICAL NIGHTLY PRN
Status: DISCONTINUED | OUTPATIENT
Start: 2025-07-16 | End: 2025-07-22 | Stop reason: HOSPADM

## 2025-07-16 RX ORDER — METHOCARBAMOL 500 MG/1
500 TABLET, FILM COATED ORAL 3 TIMES DAILY
Status: DISCONTINUED | OUTPATIENT
Start: 2025-07-16 | End: 2025-07-22 | Stop reason: HOSPADM

## 2025-07-16 RX ORDER — ONDANSETRON HYDROCHLORIDE 2 MG/ML
8 INJECTION, SOLUTION INTRAVENOUS EVERY 6 HOURS PRN
Status: DISCONTINUED | OUTPATIENT
Start: 2025-07-16 | End: 2025-07-22 | Stop reason: HOSPADM

## 2025-07-16 RX ORDER — ACETAMINOPHEN 325 MG/1
650 TABLET ORAL EVERY 4 HOURS PRN
Status: DISCONTINUED | OUTPATIENT
Start: 2025-07-16 | End: 2025-07-18

## 2025-07-16 RX ORDER — TORSEMIDE 20 MG/1
20 TABLET ORAL DAILY
Status: DISCONTINUED | OUTPATIENT
Start: 2025-07-17 | End: 2025-07-20

## 2025-07-16 RX ORDER — ASPIRIN 81 MG/1
81 TABLET ORAL DAILY
Status: DISCONTINUED | OUTPATIENT
Start: 2025-07-17 | End: 2025-07-22 | Stop reason: HOSPADM

## 2025-07-16 RX ORDER — IBUPROFEN 200 MG
24 TABLET ORAL
Status: DISCONTINUED | OUTPATIENT
Start: 2025-07-16 | End: 2025-07-22 | Stop reason: HOSPADM

## 2025-07-16 RX ORDER — NIFEDIPINE 30 MG/1
30 TABLET, EXTENDED RELEASE ORAL 2 TIMES DAILY
Status: DISCONTINUED | OUTPATIENT
Start: 2025-07-16 | End: 2025-07-22 | Stop reason: HOSPADM

## 2025-07-16 RX ORDER — PREGABALIN 75 MG/1
150 CAPSULE ORAL 2 TIMES DAILY
Status: DISCONTINUED | OUTPATIENT
Start: 2025-07-16 | End: 2025-07-22 | Stop reason: HOSPADM

## 2025-07-16 RX ORDER — IBUPROFEN 200 MG
16 TABLET ORAL
Status: DISCONTINUED | OUTPATIENT
Start: 2025-07-16 | End: 2025-07-22 | Stop reason: HOSPADM

## 2025-07-16 RX ADMIN — METHOCARBAMOL 500 MG: 500 TABLET ORAL at 08:07

## 2025-07-16 RX ADMIN — HEPARIN SODIUM 5000 UNITS: 5000 INJECTION INTRAVENOUS; SUBCUTANEOUS at 10:07

## 2025-07-16 RX ADMIN — MELATONIN TAB 3 MG 6 MG: 3 TAB at 08:07

## 2025-07-16 RX ADMIN — CIPROFLOXACIN 400 MG: 2 INJECTION, SOLUTION INTRAVENOUS at 07:07

## 2025-07-16 RX ADMIN — INSULIN GLARGINE 13 UNITS: 100 INJECTION, SOLUTION SUBCUTANEOUS at 10:07

## 2025-07-16 RX ADMIN — HYDROCODONE BITARTRATE AND ACETAMINOPHEN 1 TABLET: 5; 325 TABLET ORAL at 08:07

## 2025-07-16 RX ADMIN — SODIUM CHLORIDE 1000 ML: 9 INJECTION, SOLUTION INTRAVENOUS at 05:07

## 2025-07-16 RX ADMIN — NIFEDIPINE 30 MG: 30 TABLET, FILM COATED, EXTENDED RELEASE ORAL at 08:07

## 2025-07-16 RX ADMIN — INSULIN ASPART 1 UNITS: 100 INJECTION, SOLUTION INTRAVENOUS; SUBCUTANEOUS at 10:07

## 2025-07-16 RX ADMIN — PREGABALIN 150 MG: 75 CAPSULE ORAL at 08:07

## 2025-07-16 RX ADMIN — HYDROXYZINE HYDROCHLORIDE 25 MG: 25 TABLET, FILM COATED ORAL at 08:07

## 2025-07-16 RX ADMIN — DOCUSATE SODIUM AND SENNOSIDES 1 TABLET: 8.6; 5 TABLET ORAL at 08:07

## 2025-07-16 NOTE — PLAN OF CARE-OVED
[facility] Emergency Department Plan of Care Note  Referral Source: Primary Care Provider                               Chief Complaint   Patient presents with    Weakness       Recommendation: Emergency Department                            Encounter Diagnosis   Name Primary?    RANJEET (acute kidney injury) Yes

## 2025-07-16 NOTE — PROGRESS NOTES
Patient seen by PCP today and referred to Eloisa CABALLERO on call, Dr. William Leigh, for RANJEET on CKD, Complaint of fatigue, feeling poorly. Disposition is ED. Follow up scheduled on 07/17/2025 to outreach to assess for any additional needs/concerns.

## 2025-07-16 NOTE — ED PROVIDER NOTES
Source of History:  Patient, family, chart    Chief complaint:  Dysuria (Being treated by Dr. Mcgee for RANJEET for past 2 months w/o improvement. Painful urination as well. Sent to ER. )      HPI:  Indira Waters is a 80 y.o. female with medical history of GERD, anxiety, chronic pain, depression, fibromyalgia, hypertension, CAD, CKD, diabetes, CHF, COPD presenting with weakness, fatigue, lower extremity edema, dysuria, nausea shortness of breath.  Patient also concerned about her kidney function.  Patient states she has been following with internal medicine due to worsening kidney function.  Patient had an appointment today and was sent to the ED due to worsening weakness and fatigue.  Patient states she has been compliant with all of her medications.  Patient states I just do not feel good.    This is the extent to the patients complaints today here in the emergency department.    ROS: As per HPI     Review of patient's allergies indicates:   Allergen Reactions    Bleach (sodium hypochlorite) Shortness Of Breath    Nitrofurantoin macrocrystalline Anaphylaxis    Pcn [penicillins] Shortness Of Breath    Lipitor [atorvastatin] Diarrhea and Rash    Nsaids (non-steroidal anti-inflammatory drug)      Tolerates aspirin      Statins-hmg-coa reductase inhibitors     Toradol [ketorolac]        PMH:  As per HPI and below:  Past Medical History:   Diagnosis Date    Arthritis     Back pain     Cancer     ovarian    Cervical cancer     Coronary artery disease     Depression     Diabetes mellitus     Fibromyalgia     Heart attack     History of MI (myocardial infarction)     Hyperlipidemia     Hypertension     Lupus     Stroke     slight left sided weakness     Past Surgical History:   Procedure Laterality Date    APPENDECTOMY       SECTION      2    CORONARY ANGIOGRAPHY N/A 2022    Procedure: ANGIOGRAM, CORONARY ARTERY;  Surgeon: Jose L Méndez MD;  [place] [facility], [place], operated by [facility] CATH LAB;  Service: Cardiology;  Laterality:  N/A;    HYSTERECTOMY      with USO for cervical cancer    INJECTION OF ANESTHETIC AGENT AROUND NERVE Bilateral 05/05/2021    Procedure: BLOCK, NERVE, SYMPATHIC;  Surgeon: Holden Pereira MD;  Location: [facility] PAIN MGT;  Service: Pain Management;  Laterality: Bilateral;    INJECTION OF ANESTHETIC AGENT AROUND NERVE N/A 08/25/2021    Procedure: BLOCK, NERVE, SYMPATHETIC  need consent;  Surgeon: Holden Pereira MD;  Location: [facility] PAIN MGT;  Service: Pain Management;  Laterality: N/A;    MO EVAL,SWALLOW FUNCTION,CINE/VIDEO RECORD  09/09/2021         TONSILLECTOMY      TRIAL OF SPINAL CORD NERVE STIMULATOR N/A 3/8/2023    Procedure: LUMBAR SPINAL CORD STIMULATOR TRIAL NEVRO REP PATIENT STATES SHE NO LONGER TAKES PLAVIX;  Surgeon: Holden Pereira MD;  Location: [facility] PAIN T;  Service: Pain Management;  Laterality: N/A;       Social History[1]    Physical Exam:    BP (!) 171/73   Pulse 75   Temp 97.8 °F (36.6 °C) (Oral)   Resp 19   Wt 89.8 kg (198 lb)   SpO2 100%   BMI 37.41 kg/m²     Nursing note and vital signs reviewed.  Constitutional: No acute distress.  Nontoxic but ill-appearing.  Eyes: No conjunctival injection.Extraocular muscles are intact.  ENT: Oropharynx clear.  Normal phonation.  Mucous membranes pink but dry.  Cardiovascular: Regular rate and rhythm.  No murmurs. No gallops. No rubs.  Plus one pitting edema noted to bilateral lower extremities.  Respiratory:  Diminished to auscultation bilaterally.  Decreased air movement.  No wheezes.  No rhonchi. No rales. No accessory muscle use.  Abdomen:  Protuberant but soft.  Bilateral CVA tenderness to palpation.  Not distended.  Nontender.  No guarding.  No rebound. Non-peritoneal.  Musculoskeletal: Good range of motion all joints.  No deformities.  Neck supple.  No meningismus.  Skin: No rashes seen.  Decreased skin turgor.  No abrasions.  No ecchymoses.  Neurologic: Motor intact.  Sensation intact.  No ataxia. No focal neurological deficits.  Psych:  Appropriate, conversant    MDM    Emergent evaluation of a 79 yo female presenting for weakness, fatigue, nausea, dysuria.  Patient was seen by her PCP today due to CKD and was sent to the ED for evaluation.  Patient states symptoms have been ongoing for the past few days.  Patient is on home O2.  On exam pt is A&Ox3. VSS. Nonfebrile and nontoxic appearing.  Chronically ill-appearing.  Mucous membranes pink but dry.  Breath sounds diminished bilaterally.  No rhonchi, rales or wheezing noted on exam.  Plus one pitting edema noted to bilateral lower extremities.  Patient states swelling is worse than her baseline.  Abdomen protuberant but soft and nontender. No rebound or guarding appreciated on exam.   BS WNL.  Pt speaking in full sentences.  Decreased skin turgor noted.    History Acquisition   Additional history was acquired from other historians.  Chart, family    The patient's list of active medical problems, social history, medications, and allergies as documented per RN staff has been reviewed.     Differential Diagnoses   Based on available information and the initial assessment, the working differential diagnoses considered during this evaluation include but are not limited to dehydration, electrolyte abnormality, RANJEET, CHF exacerbation, COPD exacerbation, CKD, pneumonia, bronchitis, urinary tract infection, others.    I will get labs, imaging and reassess.  I discussed this case with my supervising physician.      LABS     Labs Reviewed   B-TYPE NATRIURETIC PEPTIDE - Abnormal       Result Value     (*)    URINALYSIS, REFLEX TO URINE CULTURE - Abnormal    Color, UA Colorless (*)     Appearance, UA Clear      pH, UA 7.0      Spec Grav UA 1.015      Protein, UA Negative      Glucose, UA Negative      Ketones, UA Negative      Bilirubin, UA Negative      Blood, UA Negative      Nitrites, UA Negative      Urobilinogen, UA Negative      Leukocyte Esterase, UA Negative     COMPREHENSIVE METABOLIC PANEL -  Abnormal    Sodium 139      Potassium 4.7      Chloride 101      CO2 22 (*)     Glucose 205 (*)     BUN 31 (*)     Creatinine 1.4      Calcium 9.7      Protein Total 7.8      Albumin 3.8      Bilirubin Total 0.2      ALP 60      AST 25      ALT 15      Anion Gap 16      eGFR 38 (*)    CBC WITH DIFFERENTIAL - Abnormal    WBC 12.64      RBC 3.83 (*)     HGB 10.7 (*)     HCT 32.9 (*)     MCV 86      MCH 27.9      MCHC 32.5      RDW 15.6 (*)     Platelet Count 319      MPV 11.7      Nucleated RBC 0      Neut % 66.0      Lymph % 21.6      Mono % 7.8      Eos % 3.8      Basophil % 0.6      Imm Grans % 0.2      Neut # 8.35 (*)     Lymph # 2.73      Mono # 0.98      Eos # 0.48      Baso # 0.07      Imm Grans # 0.03     HEPATITIS C ANTIBODY - Normal    Hep C Ab Interp Negative     HIV 1 / 2 ANTIBODY - Normal    HIV 1/2 Ag/Ab Negative     TSH - Normal    TSH 1.551     MAGNESIUM - Normal    Magnesium  2.0     TROPONIN I - Normal    Troponin-I 0.009     HEP C VIRUS HOLD SPECIMEN    Extra Tube 1     CBC W/ AUTO DIFFERENTIAL    Narrative:     The following orders were created for panel order CBC Auto Differential.  Procedure                               Abnormality         Status                     ---------                               -----------         ------                     CBC with Differential[[id number]]       Abnormal            Final result                 Please view results for these tests on the individual orders.   GREY [place] URINE HOLD         Imaging     Imaging Results              CT Abdomen Pelvis  Without Contrast (Final result)  Result time 07/16/25 13:49:06      Final result by Lio Lakhani III, MD (07/16/25 13:49:06)                   Impression:      Normal appearance of the kidneys, ureters and bladder.    Mild pancreatic atrophy.      Electronically signed by: Lio Lakhani MD  Date:    07/16/2025  Time:    13:49               Narrative:    EXAMINATION:  CT ABDOMEN PELVIS WITHOUT  CONTRAST    CLINICAL HISTORY:  UTI, recurrent/complicated (Female);    FINDINGS:  Lung bases are clear.  Liver, gallbladder, biliary tree, spleen, stomach, duodenum pancreas show nothing there may be some pancreatic atrophy.  The adrenal glands are not enlarged.  There is a right-sided extrarenal pelvis.  No renal mass, or scar is seen.  No para-aortic, retroperitoneal, or mesenteric adenopathy seen.  No bowel obstruction, ileus, or perforation seen.  Pelvic organs show nothing unusual.  No ureter calculi are seen.  The bladder is unremarkable.  Bones demonstrate nothing focal.  The appendix region is unremarkable.                                       X-Ray Chest AP Portable (Final result)  Result time 07/16/25 13:24:39      Final result by Lio Lakhani III, MD (07/16/25 13:24:39)                   Impression:      Cardiomegaly.      Electronically signed by: Lio Lakhani MD  Date:    07/16/2025  Time:    13:24               Narrative:    EXAMINATION:  XR CHEST AP PORTABLE    CLINICAL HISTORY:  weakness;    FINDINGS:  Chest one view portable.    There is cardiomegaly aortic plaque and DJD.  The lungs are clear.                                      A radiology report was available for my review at the time of the encounter.    EKG   EKG Readings: (Independently Interpreted)   Initial Reading: No STEMI. Previous EKG: Compared with most recent EKG Previous EKG Date: 3/31/25. Rhythm: Normal Sinus Rhythm. Heart Rate: 71.   My independent interpretation    No STEMI  Normal sinus rhythm  Rate of 71       Additional Consideration   All available testing was considered during the course of this workup.  Comorbidities taken into consideration during the patient's evaluation and treatment include weight, age.    Social determinants of health were taken into consideration during development of our treatment plan.    Medications   0.9% NaCl infusion (has no administration in time range)      ED Course as of 07/16/25 0486    Wed 2025   1348 Chest x-ray independently reviewed by myself and Radiology.  Negative for any acute abnormalities. [RZ]   1444 CBC unremarkable.  No leukocytosis noted.  H&H stable at 10.7 and 32.9.  Mag within normal limits at 2.0.  CMP shows a BUN of 31 with a creatinine of 1.4.  CT independently reviewed by myself and Radiology.  Negative for any acute abnormalities. [RZ]   1655 TSH within normal limits.  BNP slightly elevated at 158.  UA negative for any acute infectious process.  Troponin negative.  Discussed case with hospital medicine due to ongoing fatigue and worsening weakness.  Will admit to observation for further evaluation treatment.  Gentle hydration started.  Awaiting bed assignment. [RZ]      ED Course User Index  [RZ] Sara Person NP             CLINICAL IMPRESSION  1. Fatigue, unspecified type    2. Weakness    3. Chronic kidney disease, unspecified CKD stage    4. Combined systolic and diastolic congestive heart failure, unspecified HF chronicity    5. Type 2 diabetes mellitus with chronic kidney disease, with long-term current use of insulin, unspecified CKD stage         ED Disposition Condition    Observation           This note was created using dictation software.  This program may occasionally mistype words and phrases.      Launch MDCalc MDM  MDCalc MDM Module  2025 4:57 PM [Sara Person]  Data:  - Independent interpretation: I independently reviewed the CT Abd+Pelvis WO contr, XR port Chest AP 1 view. It showed no acute abnormality. [Sara Person]  - Test/documents/historian: 3+ tests ordered  Additional encounter diagnoses: Weakness, Fatigue, unspecified type  Risk: Admitted (Decision regarding hospitalization)             [1]   Social History  Tobacco Use    Smoking status: Former     Current packs/day: 0.00     Types: Cigarettes     Quit date: 2020     Years since quittin.7     Passive exposure: Past    Smokeless tobacco: Never   Substance Use Topics     Alcohol use: Not Currently     Comment: occasionally    Drug use: Yes     Types: Hydrocodone     Comment: three times a day        Sara Person NP  07/16/25 1385

## 2025-07-16 NOTE — PROGRESS NOTES
[facility]    Subjective:       Patient ID: Indira Waters is a 80 y.o. female.    Chief Complaint: Hypertension (F/u)      History was obtained from the patient and supplemented through chart review.  This patient is known to me.    HPI:    Patient is a 80 y.o. female w/   Past Medical History:   Diagnosis Date    Arthritis     Back pain     Cancer     ovarian    Cervical cancer     Coronary artery disease     Depression     Diabetes mellitus     Fibromyalgia     Heart attack     History of MI (myocardial infarction)     Hyperlipidemia     Hypertension     Lupus     Stroke     slight left sided weakness         History of Present Illness    CHIEF COMPLAINT:  Ms. Waters presents today for follow up of blood pressure, oxygen, and anemia.    Daughter interrupted my greeting to the visit stating that that she was concerned patient feels worse than normal, fatigue, malaise, chronic vision changes worsening over recent weeks    VISION CHANGES:  She reports progressive vision changes in the left eye with significant visual impairment, stating she can only see approximately half of her visual field. Her left eye vision has been worsening over the past months. Daughter notes pt's eyes appear swollen and dark, which is unusual for her. She has an upcoming eye appointment scheduled for the 29th after being on a waiting list for several months and expresses concern about the ongoing deterioration.    RESPIRATORY:  Using supplemental oxygen at 2 L. Her SpO2 drops to 89% when not using supplemental oxygen, requiring immediate oxygen intervention to maintain adequate respiratory function.    FATIGUE/MALAISE:  She reports significant decrease in energy and activity level, often declining suggested walks or activities. She experiences fatigue with minimal exertion, becoming tired from walking short distances such as from the bathroom to the living room. She finds it increasingly difficult to complete usual  daily tasks, sometimes feeling overwhelmed by simple activities like making the bed. She verbalizes feeling unable to give 100% effort and expresses frustration with her reduced functional capacity while actively trying to fight against these limitations.    She feels like it has been worse for the past few days compared to normal    NEUROLOGIC:  She reports progressive difficulty with secretion control, noting increased drooling both in clinical settings and at home that has been worsening over time and impacting daily functioning.    GASTROINTESTINAL:  She reports ongoing nausea and constipation.    GENITOURINARY:  She reports burning sensation with urination that is actively occurring.    LABS/TEST RESULTS:  Creatinine level decreased from 1.6 to 1.5, indicating slightly improved kidney function. She acknowledges ongoing kidney stress and vulnerability.      ROS:  General: positive fatigue, positive body aches  Eyes: positive loss of vision, positive eye swelling, positive swelling around eyes  ENT: positive excessive saliva/drooling  Gastrointestinal: positive nausea  Genitourinary: positive dysuria, positive painful urination             Lab Results   Component Value Date    HGBA1C 8.0 (H) 07/01/2025    HGBA1C 7.6 (H) 04/01/2025    HGBA1C 7.9 (H) 03/06/2025     Lab Results   Component Value Date    LDLCALC 97.4 03/06/2025    CREATININE 1.4 07/16/2025         Wt Readings from Last 15 Encounters:   07/16/25 89.8 kg (198 lb)   07/01/25 98 kg (216 lb 0.8 oz)   04/03/25 98 kg (216 lb 0.8 oz)   03/28/25 90.7 kg (199 lb 15.3 oz)   03/27/25 89.8 kg (198 lb)   03/06/25 91 kg (200 lb 9.9 oz)   03/06/25 91.1 kg (200 lb 13.4 oz)   12/27/24 91.1 kg (200 lb 13.4 oz)   10/31/24 91.6 kg (201 lb 15.1 oz)   10/30/24 91.2 kg (201 lb)   09/19/24 93.7 kg (206 lb 9.1 oz)   09/03/24 93.7 kg (206 lb 9.1 oz)   08/23/24 92.1 kg (203 lb 0.7 oz)   07/25/24 89.7 kg (197 lb 12 oz)   07/19/24 87.1 kg (192 lb 0.3 oz)       Medical  "History  Past Medical History:   Diagnosis Date    Arthritis     Back pain     Cancer     ovarian    Cervical cancer     Coronary artery disease     Depression     Diabetes mellitus     Fibromyalgia     Heart attack     History of MI (myocardial infarction)     Hyperlipidemia     Hypertension     Lupus     Stroke     slight left sided weakness       Review of Systems   Constitutional:  Positive for fatigue. Negative for fever.   HENT:  Negative for trouble swallowing.    Eyes:  Positive for visual disturbance.   Respiratory:  Positive for shortness of breath (better with oxygen).    Cardiovascular:  Negative for chest pain.   Gastrointestinal:  Positive for nausea. Negative for constipation, diarrhea and vomiting.   Genitourinary:  Positive for dysuria and urgency.   Musculoskeletal:  Positive for arthralgias and myalgias. Negative for gait problem.   Neurological:  Negative for dizziness and seizures.   Psychiatric/Behavioral:  Negative for hallucinations.          Surgical hx, family hx, social hx   Have been reviewed    Current Medications[1]    Objective:        Body mass index is 40.82 kg/m².  Vitals:    07/16/25 1122   Pulse: 77   SpO2: 96%   Height: 5' 1" (1.549 m)   PainSc:   8   PainLoc: Back     Physical Exam  Vitals and nursing note reviewed.   Constitutional:       General: She is not in acute distress.     Appearance: She is obese. She is ill-appearing.   HENT:      Head: Normocephalic.   Eyes:      General: No scleral icterus.  Cardiovascular:      Rate and Rhythm: Normal rate and regular rhythm.      Heart sounds: Normal heart sounds.   Pulmonary:      Comments: Requiring supplemental oxygen, cnanula in place  Musculoskeletal:         General: No deformity.   Skin:     Comments: Dry mucus membranes   Neurological:      Mental Status: She is alert and oriented to person, place, and time.   Psychiatric:         Behavior: Behavior normal.               Lab Results   Component Value Date    WBC 12.64 " 07/16/2025    HGB 10.7 (L) 07/16/2025    HCT 32.9 (L) 07/16/2025     07/16/2025    CHOL 190 03/06/2025    TRIG 268 (H) 03/06/2025    HDL 39 (L) 03/06/2025    ALT 15 07/16/2025    AST 25 07/16/2025     07/16/2025    K 4.7 07/16/2025     07/16/2025    CREATININE 1.4 07/16/2025    BUN 31 (H) 07/16/2025    CO2 22 (L) 07/16/2025    TSH 1.551 07/16/2025    INR 1.0 06/21/2024    HGBA1C 8.0 (H) 07/01/2025       The ASCVD Risk score (Annamarie WEATHERS, et al., 2019) failed to calculate for the following reasons:    Risk score cannot be calculated because patient has a medical history suggesting prior/existing ASCVD    (Imaging have been independently reviewed)  Chest xray PNA 4/2025    Assessment:         1. Acute on chronic respiratory failure with hypoxia    2. Chronic obstructive pulmonary disease, unspecified COPD type    3. Diabetic polyneuropathy associated with type 2 diabetes mellitus    4. Hemiplegia and hemiparesis following cerebral infarction affecting left non-dominant side    5. History of CVA (cerebrovascular accident)    6. Recurrent major depressive disorder, in remission    7. CAFL (chronic airflow limitation)    8. Chronic respiratory failure with hypoxia, on home O2 therapy    9. Chronic combined systolic and diastolic congestive heart failure    10. Essential hypertension    11. Familial hypercholesterolemia    12. Other hyperlipidemia    13. Hypertensive heart and chronic kidney disease with heart failure and stage 1 through stage 4 chronic kidney disease, or unspecified chronic kidney disease    14. Fatigue, unspecified type    15. Malaise    16. RANJEET (acute kidney injury)          Plan:     Indira was seen today for hypertension.    Diagnoses and all orders for this visit:    Acute on chronic respiratory failure with hypoxia    Chronic obstructive pulmonary disease, unspecified COPD type    Diabetic polyneuropathy associated with type 2 diabetes mellitus    Hemiplegia and hemiparesis  following cerebral infarction affecting left non-dominant side    History of CVA (cerebrovascular accident)    Recurrent major depressive disorder, in remission    CAFL (chronic airflow limitation)    Chronic respiratory failure with hypoxia, on home O2 therapy    Chronic combined systolic and diastolic congestive heart failure    Essential hypertension    Familial hypercholesterolemia    Other hyperlipidemia    Hypertensive heart and chronic kidney disease with heart failure and stage 1 through stage 4 chronic kidney disease, or unspecified chronic kidney disease    Fatigue, unspecified type  -     Refer to Emergency Dept.    Malaise  -     Refer to Emergency Dept.    RANJEET (acute kidney injury)  -     Refer to Emergency Dept.        Assessment & Plan    IMPRESSION:  1. Presenting with worsening vision (particularly in left eye), fatigue, nausea, and burning urination.  2. Creatinine improved slightly from 1.6 to 1.5, indicating mild improvement in renal function.  3. Suspect possible UTI.  4. Determined emergency room evaluation necessary for:  5. Further assessment of renal function  6. Fluid balance management  7. Potential infection treatment with antibiotics  8. Recommend focusing on renal function, fluid balance, and potential infection in ER, while also mentioning eye issues.    LEFT EYE VISION IMPAIRMENT:   Monitored the patient's left eye vision, which is severely impaired and deteriorating over months.   Ms. Waters cannot see nursing home, with the left eye being worse.   Eyes are swollen and dark.   Confirmed severe impairment with inability to see motion, count fingers, or see the physician's nose when looking straight ahead.   Ms. Waters is on a waiting list for an ophthalmology appointment scheduled for the 29th, 2 weeks from yesterday.   Advised to mention eye issues at the emergency room, with potential referral to an ophthalmology appointment or emergency clinic at [facility].    OXYGEN DEPENDENCE:   Monitored  the patient's SpO2, which was 89% without supplemental oxygen and improved to 97% with 2 L of oxygen.   Continued the patient on 2 L of supplemental oxygen to maintain adequate SpO2.    ACUTE KIDNEY FAILURE:   Monitored the patient's kidney function, noting creatinine improved slightly from 1.6 to 1.5, indicating minimal improvement.   Assessed the need for emergency evaluation due to kidney stress and possible urinary tract infection.   Referred the patient to emergency room for evaluation and management of acute kidney injury and fluid balance issues, including potential fluid resuscitation and antibiotics.    URINARY TRACT INFECTION:   Monitored the patient's urinary symptoms, noting burning sensation during urination, indicating possible urinary tract infection.   Referred the patient to emergency room for evaluation and management of potential UTI and appropriate antibiotic treatment.    NAUSEA:   Monitored the patient's GI symptoms, noting ongoing nausea.    CONSTIPATION:   Monitored the patient's bowel habits, noting constipation.    WEAKNESS:   Monitored the patient's energy levels, noting fatigue and weakness, decreased activity tolerance, and need to sit down frequently.    FOLLOW-UP PLAN:   Will determine follow-up plan after hospital evaluation and treatment.         Messaged PILAR, messaged with staff placing pt in obs.  Reviewed labs, counseled fam and pt    All of your core healthy metrics are met.      Follow up in about 4 weeks (around 8/13/2025) for f/u after ED/hospital. or sooner prn            This note was generated with the assistance of ambient listening technology. Verbal consent was obtained by the patient and accompanying visitor(s) for the recording of patient appointment to facilitate this note. I attest to having reviewed and edited the generated note for accuracy, though some syntax or spelling errors may persist. Please contact the author of this note for any clarification.      Visit  "today is associated with current or anticipated ongoing medical care related to this patient's single serious condition/complex condition of DM, respir failure. The patient will return to see me as these issues will be followed longitudinally.    45 min were used in chart review, evaluation and counseling of patient, messaging with ED< coordination, documentation and review of results on same day of service     All medications were reviewed including potential side effects and risks/benefits.  Pt was counseled to call back if anything worsens or if questions arise.        Chun Zapata MD  Family Medicine  [facility]  [address]  [address]  [place] [address]  Phone [phone]  Fax [phone]         [1] No current facility-administered medications for this visit.    Current Outpatient Medications:     acetaminophen (TYLENOL) 500 MG tablet, Take 2 tablets (1,000 mg total) by mouth every 8 (eight) hours as needed for Pain., Disp: , Rfl: 0    albuterol (ACCUNEB) 1.25 mg/3 mL Nebu, Take 3 mLs (1.25 mg total) by nebulization every 6 (six) hours as needed (for wheezing or shortness of breath). Rescue, Disp: 300 mL, Rfl: 11    allopurinoL (ZYLOPRIM) 300 MG tablet, Take 1 tablet (300 mg total) by mouth once daily., Disp: 90 tablet, Rfl: 3    aspirin (ECOTRIN) 81 MG EC tablet, Take 1 tablet (81 mg total) by mouth once daily., Disp: 30 tablet, Rfl: 0    BD ULTRA-FINE MICRO PEN NEEDLE 32 gauge x 1/4" Ndle, Use with insulin 5 times a day., Disp: 400 each, Rfl: 10    blood sugar diagnostic Strp, To check BG 4 times daily, to use with insurance preferred meter, Disp: 200 each, Rfl: 11    evolocumab (REPATHA SURECLICK) 140 mg/mL PnIj, Inject 1 mL (140 mg total) into the skin every 14 (fourteen) days., Disp: 2 each, Rfl: 11    fenofibrate micronized (LOFIBRA) 134 MG Cap, Take 1 capsule (134 mg total) by mouth daily with breakfast., Disp: 90 capsule, Rfl: 3    glucagon (BAQSIMI) 3 mg/actuation Spry, 3 " mg by Nasal route as needed (Severe Hypoglycemia)., Disp: 1 each, Rfl: 1    HYDROcodone-acetaminophen (NORCO)  mg per tablet, Take 1 tablet by mouth every 8 (eight) hours as needed for Pain., Disp: 90 tablet, Rfl: 0    insulin glargine U-100, Lantus, (LANTUS SOLOSTAR U-100 INSULIN) 100 unit/mL (3 mL) InPn pen, Inject 26 Units into the skin 2 (two) times a day., Disp: 60 mL, Rfl: 3    insulin lispro (HUMALOG KWIKPEN INSULIN) 100 unit/mL pen, Inject 15 Units into the skin before breakfast AND 15 Units daily with lunch AND 11 Units daily with dinner or evening meal. Plus correction scale. Max TDD 50units.., Disp: 40 mL, Rfl: 3    ipratropium-albuteroL (COMBIVENT RESPIMAT)  mcg/actuation inhaler, Inhale 1 puff into the lungs every 6 (six) hours., Disp: 12 g, Rfl: 3    lancets Misc, To check BG 4 times daily, to use with insurance preferred meter, Disp: 200 each, Rfl: 11    losartan (COZAAR) 50 MG tablet, Take 1 tablet (50 mg total) by mouth once daily., Disp: 90 tablet, Rfl: 3    melatonin (MELATIN) 3 mg tablet, Take 2 tablets (6 mg total) by mouth nightly as needed for Insomnia., Disp: , Rfl:     methocarbamoL (ROBAXIN) 500 MG Tab, Take 1 tablet (500 mg total) by mouth 3 (three) times daily., Disp: 90 tablet, Rfl: 2    metoclopramide HCl (REGLAN) 5 MG tablet, TAKE ONE TABLET BY MOUTH FOUR TIMES DAILY AS NEEDED FOR nausea prevention, Disp: 90 tablet, Rfl: 3    metoprolol succinate (TOPROL-XL) 100 MG 24 hr tablet, Take 1 tablet (100 mg total) by mouth once daily., Disp: 90 tablet, Rfl: 3    miconazole nitrate 2% (MICOTIN) 2 % Oint, Apply topically 2 (two) times daily. For 10 days, Disp: 142 g, Rfl: 3    NIFEdipine (PROCARDIA-XL) 30 MG (OSM) 24 hr tablet, TAKE ONE TABLET BY MOUTH TWICE DAILY, Disp: 180 tablet, Rfl: 3    nitroGLYCERIN (NITROSTAT) 0.4 MG SL tablet, DISSOLVE 1 TABLET UNDER THE TONGUE EVERY 5 MINUTES AS NEEDED FOR CHEST PAIN. DO NOT EXCEED A TOTAL OF 3 DOSES IN 15 MINUTES., Disp: 25 tablet, Rfl:  11    nystatin (MYCOSTATIN) cream, Apply topically 2 (two) times daily. Underneath Alexus's buttpaste for the rash, Disp: 30 g, Rfl: 1    nystatin (MYCOSTATIN) powder, Apply topically 4 (four) times daily., Disp: 60 g, Rfl: 2    pregabalin (LYRICA) 150 MG capsule, Take 1 capsule (150 mg total) by mouth 2 (two) times daily., Disp: 60 capsule, Rfl: 5    senna-docusate 8.6-50 mg (PERICOLACE) 8.6-50 mg per tablet, Take 1 tablet by mouth 2 (two) times daily as needed for Constipation., Disp: 60 tablet, Rfl: 0    sertraline (ZOLOFT) 100 MG tablet, TAKE ONE TABLET BY MOUTH EVERY DAY, Disp: 90 tablet, Rfl: 3    torsemide (DEMADEX) 20 MG Tab, TAKE ONE TABLET BY MOUTH ONCE DAILY, Disp: 90 tablet, Rfl: 3    blood-glucose meter kit, To check BG 4 times daily, to use with insurance preferred meter, Disp: 1 each, Rfl: 0

## 2025-07-16 NOTE — NURSING
Pt transferred from the ED. She is on home dose o2 at 2L nc. Was able to ambulate to the bed. Daughter informed me that pt has an old sacral injury that is healing. Otherwise skin is impeccable with minor general bruising. Has +1 edema Bilat lower extremities. Patient is alert and oriented, has purewick in place. Daughter states she will be staying the night. Educated patient on to call staff for in need of assistance for fall preventives. Call light in hand and instructed how to use.

## 2025-07-16 NOTE — PHARMACY MED REC
"Admission Medication History     The home medication history was taken by Giuliana Clark.    You may go to "Admission" then "Reconcile Home Medications" tabs to review and/or act upon these items.     The home medication list has been updated by the Pharmacy department.   Please read ALL comments highlighted in yellow.   Please address this information as you see fit.    Feel free to contact us if you have any questions or require assistance.      The patient daughter is at bedside with medication list.          .        "

## 2025-07-16 NOTE — ED TRIAGE NOTES
Indira Waters, a 80 y.o. female presents to the ED BIB pt's daughter with complaints of pt having pain with urination and was told to come to the ED by her doctor. Denies chest pain and sob. Both patient and daughter says that Pt resting comfortably. Connected to cardiac monitor, BP cuff, and pulse oximeter. ED workup in progress. Call light within reach. Safety measures in place. Denies further needs. Plan of care ongoing.

## 2025-07-17 LAB
ABSOLUTE EOSINOPHIL (OHS): 0.61 K/UL
ABSOLUTE MONOCYTE (OHS): 1.14 K/UL (ref 0.3–1)
ABSOLUTE NEUTROPHIL COUNT (OHS): 6.12 K/UL (ref 1.8–7.7)
ALBUMIN SERPL BCP-MCNC: 3.4 G/DL (ref 3.5–5.2)
ALP SERPL-CCNC: 54 UNIT/L (ref 40–150)
ALT SERPL W/O P-5'-P-CCNC: 12 UNIT/L (ref 10–44)
ANION GAP (OHS): 13 MMOL/L (ref 8–16)
AST SERPL-CCNC: 17 UNIT/L (ref 11–45)
BASOPHILS # BLD AUTO: 0.07 K/UL
BASOPHILS NFR BLD AUTO: 0.6 %
BILIRUB SERPL-MCNC: 0.2 MG/DL (ref 0.1–1)
BUN SERPL-MCNC: 26 MG/DL (ref 8–23)
CALCIUM SERPL-MCNC: 9.1 MG/DL (ref 8.7–10.5)
CHLORIDE SERPL-SCNC: 103 MMOL/L (ref 95–110)
CO2 SERPL-SCNC: 24 MMOL/L (ref 23–29)
CREAT SERPL-MCNC: 1.3 MG/DL (ref 0.5–1.4)
ERYTHROCYTE [DISTWIDTH] IN BLOOD BY AUTOMATED COUNT: 15.5 % (ref 11.5–14.5)
GFR SERPLBLD CREATININE-BSD FMLA CKD-EPI: 42 ML/MIN/1.73/M2
GLUCOSE SERPL-MCNC: 211 MG/DL (ref 70–110)
HCT VFR BLD AUTO: 32.5 % (ref 37–48.5)
HGB BLD-MCNC: 10.1 GM/DL (ref 12–16)
HOLD SPECIMEN: NORMAL
IMM GRANULOCYTES # BLD AUTO: 0.04 K/UL (ref 0–0.04)
IMM GRANULOCYTES NFR BLD AUTO: 0.4 % (ref 0–0.5)
LYMPHOCYTES # BLD AUTO: 3.09 K/UL (ref 1–4.8)
MAGNESIUM SERPL-MCNC: 2.1 MG/DL (ref 1.6–2.6)
MCH RBC QN AUTO: 26.8 PG (ref 27–31)
MCHC RBC AUTO-ENTMCNC: 31.1 G/DL (ref 32–36)
MCV RBC AUTO: 86 FL (ref 82–98)
NUCLEATED RBC (/100WBC) (OHS): 0 /100 WBC
OHS QRS DURATION: 80 MS
OHS QTC CALCULATION: 469 MS
PLATELET # BLD AUTO: 295 K/UL (ref 150–450)
PMV BLD AUTO: 11.6 FL (ref 9.2–12.9)
POCT GLUCOSE: 140 MG/DL (ref 70–110)
POCT GLUCOSE: 199 MG/DL (ref 70–110)
POCT GLUCOSE: 259 MG/DL (ref 70–110)
POCT GLUCOSE: 423 MG/DL (ref 70–110)
POCT GLUCOSE: 70 MG/DL (ref 70–110)
POTASSIUM SERPL-SCNC: 4.3 MMOL/L (ref 3.5–5.1)
PROT SERPL-MCNC: 7 GM/DL (ref 6–8.4)
RBC # BLD AUTO: 3.77 M/UL (ref 4–5.4)
RELATIVE EOSINOPHIL (OHS): 5.5 %
RELATIVE LYMPHOCYTE (OHS): 27.9 % (ref 18–48)
RELATIVE MONOCYTE (OHS): 10.3 % (ref 4–15)
RELATIVE NEUTROPHIL (OHS): 55.3 % (ref 38–73)
SODIUM SERPL-SCNC: 140 MMOL/L (ref 136–145)
SODIUM UR-SCNC: 65 MMOL/L (ref 20–250)
WBC # BLD AUTO: 11.07 K/UL (ref 3.9–12.7)

## 2025-07-17 PROCEDURE — 97162 PT EVAL MOD COMPLEX 30 MIN: CPT

## 2025-07-17 PROCEDURE — 25000003 PHARM REV CODE 250: Performed by: NURSE PRACTITIONER

## 2025-07-17 PROCEDURE — 97166 OT EVAL MOD COMPLEX 45 MIN: CPT

## 2025-07-17 PROCEDURE — 11000001 HC ACUTE MED/SURG PRIVATE ROOM

## 2025-07-17 PROCEDURE — 25000242 PHARM REV CODE 250 ALT 637 W/ HCPCS: Performed by: INTERNAL MEDICINE

## 2025-07-17 PROCEDURE — 83735 ASSAY OF MAGNESIUM: CPT | Performed by: NURSE PRACTITIONER

## 2025-07-17 PROCEDURE — 63600175 PHARM REV CODE 636 W HCPCS: Performed by: NURSE PRACTITIONER

## 2025-07-17 PROCEDURE — 97535 SELF CARE MNGMENT TRAINING: CPT

## 2025-07-17 PROCEDURE — 97116 GAIT TRAINING THERAPY: CPT

## 2025-07-17 PROCEDURE — 84520 ASSAY OF UREA NITROGEN: CPT | Performed by: NURSE PRACTITIONER

## 2025-07-17 PROCEDURE — 96372 THER/PROPH/DIAG INJ SC/IM: CPT | Performed by: NURSE PRACTITIONER

## 2025-07-17 PROCEDURE — 36415 COLL VENOUS BLD VENIPUNCTURE: CPT | Performed by: NURSE PRACTITIONER

## 2025-07-17 PROCEDURE — 27000221 HC OXYGEN, UP TO 24 HOURS

## 2025-07-17 PROCEDURE — 85025 COMPLETE CBC W/AUTO DIFF WBC: CPT | Performed by: NURSE PRACTITIONER

## 2025-07-17 PROCEDURE — 94761 N-INVAS EAR/PLS OXIMETRY MLT: CPT

## 2025-07-17 PROCEDURE — 99900035 HC TECH TIME PER 15 MIN (STAT)

## 2025-07-17 PROCEDURE — 94640 AIRWAY INHALATION TREATMENT: CPT

## 2025-07-17 RX ORDER — INSULIN ASPART 100 [IU]/ML
15 INJECTION, SOLUTION INTRAVENOUS; SUBCUTANEOUS
Status: DISCONTINUED | OUTPATIENT
Start: 2025-07-17 | End: 2025-07-19

## 2025-07-17 RX ORDER — INSULIN GLARGINE 100 [IU]/ML
18 INJECTION, SOLUTION SUBCUTANEOUS 2 TIMES DAILY
Status: DISCONTINUED | OUTPATIENT
Start: 2025-07-17 | End: 2025-07-19

## 2025-07-17 RX ADMIN — HYDROCODONE BITARTRATE AND ACETAMINOPHEN 1 TABLET: 5; 325 TABLET ORAL at 09:07

## 2025-07-17 RX ADMIN — IPRATROPIUM BROMIDE AND ALBUTEROL 1 PUFF: 20; 100 SPRAY, METERED RESPIRATORY (INHALATION) at 08:07

## 2025-07-17 RX ADMIN — INSULIN ASPART 3 UNITS: 100 INJECTION, SOLUTION INTRAVENOUS; SUBCUTANEOUS at 01:07

## 2025-07-17 RX ADMIN — METOPROLOL SUCCINATE 100 MG: 50 TABLET, EXTENDED RELEASE ORAL at 09:07

## 2025-07-17 RX ADMIN — ASPIRIN 81 MG: 81 TABLET, COATED ORAL at 09:07

## 2025-07-17 RX ADMIN — HYDROCODONE BITARTRATE AND ACETAMINOPHEN 1 TABLET: 5; 325 TABLET ORAL at 10:07

## 2025-07-17 RX ADMIN — INSULIN ASPART 15 UNITS: 100 INJECTION, SOLUTION INTRAVENOUS; SUBCUTANEOUS at 04:07

## 2025-07-17 RX ADMIN — HYDROCODONE BITARTRATE AND ACETAMINOPHEN 1 TABLET: 5; 325 TABLET ORAL at 04:07

## 2025-07-17 RX ADMIN — IPRATROPIUM BROMIDE AND ALBUTEROL 1 PUFF: 20; 100 SPRAY, METERED RESPIRATORY (INHALATION) at 12:07

## 2025-07-17 RX ADMIN — PREGABALIN 150 MG: 75 CAPSULE ORAL at 09:07

## 2025-07-17 RX ADMIN — INSULIN ASPART 13 UNITS: 100 INJECTION, SOLUTION INTRAVENOUS; SUBCUTANEOUS at 09:07

## 2025-07-17 RX ADMIN — NIFEDIPINE 30 MG: 30 TABLET, FILM COATED, EXTENDED RELEASE ORAL at 09:07

## 2025-07-17 RX ADMIN — TORSEMIDE 20 MG: 20 TABLET ORAL at 10:07

## 2025-07-17 RX ADMIN — INSULIN GLARGINE 13 UNITS: 100 INJECTION, SOLUTION SUBCUTANEOUS at 09:07

## 2025-07-17 RX ADMIN — SERTRALINE HYDROCHLORIDE 100 MG: 100 TABLET ORAL at 09:07

## 2025-07-17 RX ADMIN — INSULIN GLARGINE 18 UNITS: 100 INJECTION, SOLUTION SUBCUTANEOUS at 09:07

## 2025-07-17 RX ADMIN — METHOCARBAMOL 500 MG: 500 TABLET ORAL at 09:07

## 2025-07-17 RX ADMIN — DOCUSATE SODIUM AND SENNOSIDES 1 TABLET: 8.6; 5 TABLET ORAL at 09:07

## 2025-07-17 RX ADMIN — INSULIN ASPART 13 UNITS: 100 INJECTION, SOLUTION INTRAVENOUS; SUBCUTANEOUS at 01:07

## 2025-07-17 RX ADMIN — HEPARIN SODIUM 5000 UNITS: 5000 INJECTION INTRAVENOUS; SUBCUTANEOUS at 01:07

## 2025-07-17 RX ADMIN — HEPARIN SODIUM 5000 UNITS: 5000 INJECTION INTRAVENOUS; SUBCUTANEOUS at 05:07

## 2025-07-17 RX ADMIN — CIPROFLOXACIN 400 MG: 2 INJECTION, SOLUTION INTRAVENOUS at 05:07

## 2025-07-17 RX ADMIN — CIPROFLOXACIN 400 MG: 2 INJECTION, SOLUTION INTRAVENOUS at 06:07

## 2025-07-17 RX ADMIN — METHOCARBAMOL 500 MG: 500 TABLET ORAL at 04:07

## 2025-07-17 RX ADMIN — HEPARIN SODIUM 5000 UNITS: 5000 INJECTION INTRAVENOUS; SUBCUTANEOUS at 09:07

## 2025-07-17 RX ADMIN — ALLOPURINOL 300 MG: 300 TABLET ORAL at 09:07

## 2025-07-17 RX ADMIN — MELATONIN TAB 3 MG 6 MG: 3 TAB at 09:07

## 2025-07-17 RX ADMIN — IPRATROPIUM BROMIDE AND ALBUTEROL 1 PUFF: 20; 100 SPRAY, METERED RESPIRATORY (INHALATION) at 09:07

## 2025-07-17 NOTE — PLAN OF CARE
Problem: Occupational Therapy  Goal: Occupational Therapy Goal  Description: Goals to be met by: 7/31/2025     Patient will increase functional independence with ADLs by performing:    UE Dressing with Supervision.  LE Dressing with Supervision.  Grooming while standing at sink with Modified [place].  Toileting from bedside commode with Stand-by Assistance for hygiene and clothing management.   Toilet transfer to bedside commode with Supervision.    Outcome: Progressing     Initial OT eval/treat complete.  Has SPC, shower chair, and BSC.  Will defer AD needs to PT.  No bathing/toileting needs anticipated.  Recommend post acute Low Intensity therapy.  Lives with daughter that assists with ADL.  To benefit from continued acute care OT services to increase independence in self-care/functional transfers.  OT to follow.

## 2025-07-17 NOTE — SUBJECTIVE & OBJECTIVE
Past Medical History:   Diagnosis Date    Arthritis     Back pain     Cancer     ovarian    Cervical cancer     Coronary artery disease     Depression     Diabetes mellitus     Fibromyalgia     Heart attack     History of MI (myocardial infarction)     Hyperlipidemia     Hypertension     Lupus     Stroke     slight left sided weakness       Past Surgical History:   Procedure Laterality Date    APPENDECTOMY       SECTION      2    CORONARY ANGIOGRAPHY N/A 2022    Procedure: ANGIOGRAM, CORONARY ARTERY;  Surgeon: Jose L Méndez MD;  Location: [facility] CATH LAB;  Service: Cardiology;  Laterality: N/A;    HYSTERECTOMY      with USO for cervical cancer    INJECTION OF ANESTHETIC AGENT AROUND NERVE Bilateral 2021    Procedure: BLOCK, NERVE, SYMPATHIC;  Surgeon: Holden Pereira MD;  Location: [facility] PAIN MGT;  Service: Pain Management;  Laterality: Bilateral;    INJECTION OF ANESTHETIC AGENT AROUND NERVE N/A 2021    Procedure: BLOCK, NERVE, SYMPATHETIC  need consent;  Surgeon: Holden Pereira MD;  Location: [facility] PAIN MGT;  Service: Pain Management;  Laterality: N/A;    WI EVAL,SWALLOW FUNCTION,CINE/VIDEO RECORD  2021         TONSILLECTOMY      TRIAL OF SPINAL CORD NERVE STIMULATOR N/A 3/8/2023    Procedure: LUMBAR SPINAL CORD STIMULATOR TRIAL NEVRO REP PATIENT STATES SHE NO LONGER TAKES PLAVIX;  Surgeon: Holden Pereira MD;  Location: [facility] PAIN MGT;  Service: Pain Management;  Laterality: N/A;       Review of patient's allergies indicates:   Allergen Reactions    Bleach (sodium hypochlorite) Shortness Of Breath    Nitrofurantoin macrocrystalline Anaphylaxis    Pcn [penicillins] Shortness Of Breath    Lipitor [atorvastatin] Diarrhea and Rash    Nsaids (non-steroidal anti-inflammatory drug)      Tolerates aspirin      Statins-hmg-coa reductase inhibitors     Toradol [ketorolac]        No current facility-administered medications on file prior to encounter.     Current Outpatient Medications  on File Prior to Encounter   Medication Sig    acetaminophen (TYLENOL) 500 MG tablet Take 2 tablets (1,000 mg total) by mouth every 8 (eight) hours as needed for Pain.    albuterol (ACCUNEB) 1.25 mg/3 mL Nebu Take 3 mLs (1.25 mg total) by nebulization every 6 (six) hours as needed (for wheezing or shortness of breath). Rescue    allopurinoL (ZYLOPRIM) 300 MG tablet Take 1 tablet (300 mg total) by mouth once daily.    aspirin (ECOTRIN) 81 MG EC tablet Take 1 tablet (81 mg total) by mouth once daily.    evolocumab (REPATHA SURECLICK) 140 mg/mL PnIj Inject 1 mL (140 mg total) into the skin every 14 (fourteen) days.    fenofibrate micronized (LOFIBRA) 134 MG Cap Take 1 capsule (134 mg total) by mouth daily with breakfast.    HYDROcodone-acetaminophen (NORCO)  mg per tablet Take 1 tablet by mouth every 8 (eight) hours as needed for Pain.    insulin glargine U-100, Lantus, (LANTUS SOLOSTAR U-100 INSULIN) 100 unit/mL (3 mL) InPn pen Inject 26 Units into the skin 2 (two) times a day.    insulin lispro (HUMALOG KWIKPEN INSULIN) 100 unit/mL pen Inject 15 Units into the skin before breakfast AND 15 Units daily with lunch AND 11 Units daily with dinner or evening meal. Plus correction scale. Max TDD 50units..    ipratropium-albuteroL (COMBIVENT RESPIMAT)  mcg/actuation inhaler Inhale 1 puff into the lungs every 6 (six) hours.    losartan (COZAAR) 50 MG tablet Take 1 tablet (50 mg total) by mouth once daily.    melatonin (MELATIN) 3 mg tablet Take 2 tablets (6 mg total) by mouth nightly as needed for Insomnia.    methocarbamoL (ROBAXIN) 500 MG Tab Take 1 tablet (500 mg total) by mouth 3 (three) times daily.    metoclopramide HCl (REGLAN) 5 MG tablet TAKE ONE TABLET BY MOUTH FOUR TIMES DAILY AS NEEDED FOR nausea prevention    metoprolol succinate (TOPROL-XL) 100 MG 24 hr tablet Take 1 tablet (100 mg total) by mouth once daily.    miconazole nitrate 2% (MICOTIN) 2 % Oint Apply topically 2 (two) times daily. For 10 days  "   NIFEdipine (PROCARDIA-XL) 30 MG (OSM) 24 hr tablet TAKE ONE TABLET BY MOUTH TWICE DAILY    nystatin (MYCOSTATIN) cream Apply topically 2 (two) times daily. Underneath Alexus's buttpaste for the rash    nystatin (MYCOSTATIN) powder Apply topically 4 (four) times daily.    pregabalin (LYRICA) 150 MG capsule Take 1 capsule (150 mg total) by mouth 2 (two) times daily.    senna-docusate 8.6-50 mg (PERICOLACE) 8.6-50 mg per tablet Take 1 tablet by mouth 2 (two) times daily as needed for Constipation.    sertraline (ZOLOFT) 100 MG tablet TAKE ONE TABLET BY MOUTH EVERY DAY    torsemide (DEMADEX) 20 MG Tab TAKE ONE TABLET BY MOUTH ONCE DAILY    BD ULTRA-FINE MICRO PEN NEEDLE 32 gauge x 1/4" Ndle Use with insulin 5 times a day.    blood sugar diagnostic Strp To check BG 4 times daily, to use with insurance preferred meter    blood-glucose meter kit To check BG 4 times daily, to use with insurance preferred meter    glucagon (BAQSIMI) 3 mg/actuation Spry 3 mg by Nasal route as needed (Severe Hypoglycemia).    lancets Misc To check BG 4 times daily, to use with insurance preferred meter    nitroGLYCERIN (NITROSTAT) 0.4 MG SL tablet DISSOLVE 1 TABLET UNDER THE TONGUE EVERY 5 MINUTES AS NEEDED FOR CHEST PAIN. DO NOT EXCEED A TOTAL OF 3 DOSES IN 15 MINUTES.    [DISCONTINUED] blood-glucose transmitter (DEXCOM G6 TRANSMITTER) Nicole 1 each by Misc.(Non-Drug; Combo Route) route continuous prn.     Family History       Problem Relation (Age of Onset)    COPD Mother    Colon cancer Maternal Grandmother    Coronary artery disease Father    Diabetes Father    Hernia Mother    Lupus Mother    Ovarian cancer Mother    Uterine cancer Mother          Tobacco Use    Smoking status: Former     Current packs/day: 0.00     Types: Cigarettes     Quit date: 2020     Years since quittin.7     Passive exposure: Past    Smokeless tobacco: Never   Substance and Sexual Activity    Alcohol use: Not Currently     Comment: occasionally    Drug " use: Yes     Types: Hydrocodone     Comment: three times a day    Sexual activity: Not Currently     Review of Systems   Constitutional:  Positive for activity change, appetite change, chills, fatigue and fever. Negative for diaphoresis and unexpected weight change.   Respiratory:  Positive for cough, chest tightness and shortness of breath. Negative for wheezing.    Cardiovascular:  Positive for chest pain, palpitations and leg swelling.   Gastrointestinal:  Positive for abdominal pain, nausea and vomiting. Negative for abdominal distention, blood in stool, constipation and diarrhea.   Genitourinary:  Positive for dysuria and urgency. Negative for decreased urine volume, difficulty urinating, flank pain and hematuria.   Musculoskeletal:  Positive for arthralgias, back pain and gait problem.   Neurological:  Positive for dizziness, weakness, light-headedness and headaches. Negative for syncope.       Objective:     Vital Signs (Most Recent):  Temp: 97.5 °F (36.4 °C) (07/16/25 1952)  Pulse: 75 (07/16/25 1952)  Resp: 16 (07/16/25 1952)  BP: 134/83 (07/16/25 1952)  SpO2: 100 % (07/16/25 2013) Vital Signs (24h Range):  Temp:  [97.5 °F (36.4 °C)-98.4 °F (36.9 °C)] 97.5 °F (36.4 °C)  Pulse:  [69-80] 75  Resp:  [16-24] 16  SpO2:  [96 %-100 %] 100 %  BP: (134-172)/(67-83) 134/83     Weight: 89.8 kg (198 lb)  Body mass index is 37.41 kg/m².     Physical Exam  Vitals and nursing note reviewed.   Constitutional:       General: She is not in acute distress.     Appearance: Normal appearance. She is well-developed. She is obese. She is ill-appearing (chronically). She is not toxic-appearing.   HENT:      Head: Normocephalic and atraumatic.      Mouth/Throat:      Dentition: Normal dentition.   Eyes:      General: Lids are normal.      Extraocular Movements: Extraocular movements intact.      Conjunctiva/sclera: Conjunctivae normal.   Cardiovascular:      Rate and Rhythm: Regular rhythm. Tachycardia present.      Heart sounds: No  murmur heard.  Pulmonary:      Effort: Pulmonary effort is normal.      Breath sounds: No wheezing or rhonchi.      Comments: Resting on nasal cannula.  No increased work of breathing or conversational dyspnea.    Abdominal:      General: Bowel sounds are normal. There is no distension.      Palpations: Abdomen is soft.      Tenderness: There is no abdominal tenderness.   Musculoskeletal:      Cervical back: Neck supple.      Right lower leg: Edema (1+ pitting mid shin to ankle) present.      Left lower leg: Edema (1+ pitting mid shin to ankle) present.   Skin:     General: Skin is warm and dry.      Findings: No erythema or rash.   Neurological:      Mental Status: She is alert and oriented to person, place, and time.             Significant Labs: All pertinent labs within the past 24 hours have been reviewed.  CBC:   Recent Labs   Lab 07/16/25  1408   WBC 12.64   HGB 10.7*   HCT 32.9*        CMP:   Recent Labs   Lab 07/15/25  0929 07/16/25  1408    139   K 4.6 4.7    101   CO2 23 22*   * 205*   BUN 30* 31*   CREATININE 1.5* 1.4   CALCIUM 8.9 9.7   PROT  --  7.8   ALBUMIN 3.7 3.8   BILITOT  --  0.2   ALKPHOS  --  60   AST  --  25   ALT  --  15   ANIONGAP 14 16     Cardiac Markers:   Recent Labs   Lab 07/16/25  1557   *     Troponin:   Recent Labs   Lab 07/16/25  1408   TROPONINI 0.009     TSH:   Recent Labs   Lab 07/16/25  1408   TSH 1.551     Urine Studies:   Recent Labs   Lab 07/16/25  1410   COLORU Colorless*   APPEARANCEUA Clear   PHUR 7.0   SPECGRAV 1.015   PROTEINUA Negative   GLUCUA Negative   BILIRUBINUA Negative   OCCULTUA Negative   NITRITE Negative   UROBILINOGEN Negative   LEUKOCYTESUR Negative       Significant Imaging: I have reviewed all pertinent imaging results/findings within the past 24 hours.

## 2025-07-17 NOTE — PLAN OF CARE
MOON Message     Medicare Outpatient and Observation Notification regarding financial responsibility Explained to patient/caregiver; Signed/date by patient/caregiver   Date CRABTREE was signed 7/17/2025   Time CRABTREE was signed 1010

## 2025-07-17 NOTE — ASSESSMENT & PLAN NOTE
-history of   -[facility] 05/2022 detailed in HPI  -continue home ASA  -hold home fenofibrate and repatha for now >> resume at DC  -no statin due to allergies  -continue tele monitoring  -EKG PRN concerns

## 2025-07-17 NOTE — PLAN OF CARE
Case Management Assessment     PCP: Dr Chun Zapata    Patient Arrived From: Home  Existing Help at Home:     Esha Severino (Daughter)  [phone] (Mobile)       Barriers to Discharge: None    Discharge Plan:    A. Home w/family   B. Home      Patient AAOX3, utilizes DME for assistance. Home DME-rollator, BSC, wheelchair, shower chair, cane, oxygen. PCP correct on facesheet. Daughter to provide transportation.     07/17/25 1020   Discharge Assessment   Assessment Type Discharge Planning Assessment   Confirmed/corrected address, phone number and insurance Yes   Confirmed Demographics Correct on Facesheet   Source of Information patient   Communicated KYLE with patient/caregiver Date not available/Unable to determine   People in Home child(georges), adult   Name(s) of People in Home Esha Severino (Daughter)  [phone] (Mobile)   Do you expect to return to your current living situation? Yes   Do you have help at home or someone to help you manage your care at home? Yes   Who are your caregiver(s) and their phone number(s)? Esha Severino (Daughter)  [phone] (Mobile)   Prior to hospitilization cognitive status: Alert/Oriented   Current cognitive status: Alert/Oriented   Walking or Climbing Stairs Difficulty yes   Walking or Climbing Stairs ambulation difficulty, requires equipment   Dressing/Bathing Difficulty yes   Dressing/Bathing bathing difficulty, assistance 1 person   Equipment Currently Used at Home cane, straight;rollator;shower chair;bedside commode;oxygen;wheelchair   Readmission within 30 days? No   Do you currently have service(s) that help you manage your care at home? No   Do you take prescription medications? Yes   Do you have prescription coverage? Yes   Do you have any problems affording any of your prescribed medications? No   Is the patient taking medications as prescribed? yes   Who is going to help you get home at discharge? Esha Severino (Daughter)  [phone] (Mobile)   How do  you get to doctors appointments? family or friend will provide   Are you on dialysis? No   Do you take coumadin? No   Discharge Plan A Home with family   Discharge Plan B Home   DME Needed Upon Discharge  none   Discharge Plan discussed with: Patient   Transition of Care Barriers None     [facility] ([address])  Initial Discharge Assessment       Primary Care Provider: Chun Zapata MD    Admission Diagnosis: Weakness [R53.1]  Fatigue, unspecified type [R53.83]  Type 2 diabetes mellitus with chronic kidney disease, with long-term current use of insulin, unspecified CKD stage [E11.22, Z79.4]  Chronic kidney disease, unspecified CKD stage [N18.9]  Combined systolic and diastolic congestive heart failure, unspecified HF chronicity [I50.40]    Admission Date: 7/16/2025  Expected Discharge Date:     Transition of Care Barriers: None    Payor: MEDICARE / Plan: MEDICARE PART A & B / Product Type: Government /     Extended Emergency Contact Information  Primary Emergency Contact: Esha Severino  Address: [address] [place] [address]  Home Phone: [phone]  Mobile Phone: [phone]  Relation: Daughter   needed? No    Discharge Plan A: Home with family  Discharge Plan B: Home      [id number] - [place], [place] - [address] [place] [address]  Phone: [phone] Fax: [phone]    [facility] [address] [place] [address] - [facility] [address]  Phone: [phone] Fax: [phone]    [facility]  [address] [facility] [address]  [place] [address]  Phone: [phone] Fax: [phone]    [facility]  [address]  [place] [address]  Phone: [phone] Fax: [phone]      Initial Assessment (most recent)       Adult Discharge Assessment - 07/17/25 1020           Discharge Assessment    Assessment Type Discharge Planning Assessment     Confirmed/corrected address, phone number and insurance Yes     Confirmed Demographics Correct on Facesheet     Source of Information patient     Communicated KYLE with patient/caregiver Date not available/Unable to determine     People in Home child(georges), adult     Name(s) of People in Home Esha Severino (Daughter)  [phone] (Mobile)     Do you expect to return to your current living situation? Yes     Do you have help at home or someone to help you manage your care at home? Yes     Who are your caregiver(s) and their phone number(s)? Esha Severino (Daughter)  [phone] (Mobile)     Prior to hospitilization cognitive status: Alert/Oriented     Current cognitive status: Alert/Oriented     Walking or Climbing Stairs Difficulty yes     Walking or Climbing Stairs ambulation difficulty, requires equipment     Dressing/Bathing Difficulty yes     Dressing/Bathing bathing difficulty, assistance 1 person     Equipment Currently Used at Home cane, straight;rollator;shower chair;bedside commode;oxygen;wheelchair     Readmission within 30 days? No     Do you currently have service(s) that help you manage your care at home? No     Do you take prescription medications? Yes     Do you have prescription coverage? Yes     Do you have any problems affording any of your prescribed medications? No     Is the patient taking medications as prescribed? yes     Who is going to help you get home at discharge? Esha Severino (Daughter)  [phone] (Mobile)     How do you get to doctors appointments? family or friend will provide     Are you on dialysis? No     Do you take coumadin? No     Discharge Plan A Home with family     Discharge Plan B Home     DME Needed Upon Discharge  none     Discharge Plan discussed with: Patient     Transition of Care Barriers None

## 2025-07-17 NOTE — HPI
Ms. Waters is a 80 YOF with PMHx of HFrEF (EF 40-50% 06/2024), CAD ([facility] 05/2022 with severe 2 vessel, predominantly distal, disease), aortic atherosclerosis with tortuous aorta, HTN, HLD, DM type II, diabetic polyneuropathy,COPD, chronic respiratory insuffiencey/chronic airflow limitation on home oxygen qHS , former smoker, CKD III (baseline SCr 1.1-1.2), chronic anemia, history of CVA with residual left-sided deficits, GERD, fibromyalgia, chronic pain, osteoarthritis, anxiety/depression, and obesity.     She presents to ED with her daughter due to concerns for weakness, fatigue, shortness of breath, palpitations, chest discomfort, light headiness, dizziness, headaches, dysuria, nausea, vomiting, abdominal discomfort, chronic back pain, fever, chills, cough noting I just do not feel good. She denies diarrhea, constipation, hematuria, decreased UOP, blood in stool, seizures, recent falls, or syncope. She lives with her daughter, uses ambualtory aides as needed, and requires some assistance with ADLs.     In the ED she is hypertensive otherwise HDS and afebrile.  CBC with WBC 13, H/H 10.7/33, platelets 319.  Chemistry was , K 4.7, chloride 101, CO2 22, BUN 31, SCr 1.4, glucose 205.  Magnesium 2.0.  LFTs unremarkable.  .  Troponin 0.009.  TSH 1.551.  Hepatitis-C and HIV nonreactive.  UA does not appear infectious.  CXR with cardiomegaly. CT A/P with normal appearance of the kidneys, ureters and bladder; mild pancreatic atrophy.     The patient was placed in observation to the Hospital Medicine Service for further evaluation and treatment.

## 2025-07-17 NOTE — ASSESSMENT & PLAN NOTE
- Chronic; appears stable.  -continue home metoprolol and nifedipine with hold parameters   - resume losartan, torsemide tomorrow AM if creatinine remains stable.

## 2025-07-17 NOTE — ASSESSMENT & PLAN NOTE
-chronic  -continue home allopurinol and lyrica   -PRN supportive care for pain  -fall precautions  -consider PT/OT consult in AM

## 2025-07-17 NOTE — ASSESSMENT & PLAN NOTE
-history of   -continue home ASA  -hold home fenofibrate and repatha for now >> resume at DC  -no statin due to allergies  -fall precautions

## 2025-07-17 NOTE — ASSESSMENT & PLAN NOTE
- History of COPD with chronic resp failure on home O2.  - Continue PRN nebulizations, controller, incentive spirometry, supportive care.

## 2025-07-17 NOTE — PLAN OF CARE
Within 2 weeks patient will increase functional independence with mobility by performing:     Problem: Physical Therapy  Goal: Physical Therapy Goal  Description: 1. [place] with Bed Mobility  2. Supervision with Transfers using LRAD  3. Tolerate OOB x 2 hours   4. Gait  x 150 feet with Supervision using LRAD.   5. Ascend/descend 6 stair with right Handrails Stand-by Assistance using LRAD.     Outcome: Progressing

## 2025-07-17 NOTE — PLAN OF CARE
Problem: Adult Inpatient Plan of Care  Goal: Plan of Care Review  Outcome: Progressing  Goal: Patient-Specific Goal (Individualized)  Outcome: Progressing  Goal: Absence of Hospital-Acquired Illness or Injury  Outcome: Progressing  Goal: Optimal Comfort and Wellbeing  Outcome: Progressing  Goal: Readiness for Transition of Care  Outcome: Progressing     Problem: Infection  Goal: Absence of Infection Signs and Symptoms  Outcome: Progressing     Problem: Diabetes Comorbidity  Goal: Blood Glucose Level Within Targeted Range  Outcome: Progressing     Problem: Bariatric Environmental Safety  Goal: Safety Maintained with Care  Outcome: Progressing     Problem: Anxiety Signs/Symptoms  Goal: Optimized Energy Level (Anxiety Signs/Symptoms)  Outcome: Progressing  Goal: Optimized Cognitive Function (Anxiety Signs/Symptoms)  Outcome: Progressing  Goal: Improved Mood Symptoms (Anxiety Signs/Symptoms)  Outcome: Progressing  Goal: Improved Sleep (Anxiety Signs/Symptoms)  Outcome: Progressing  Goal: Enhanced Social, Occupational or Functional Skills (Anxiety Signs/Symptoms)  Outcome: Progressing  Goal: Improved Somatic Symptoms (Anxiety Signs/Symptoms)  Outcome: Progressing

## 2025-07-17 NOTE — PLAN OF CARE
Problem: Adult Inpatient Plan of Care  Goal: Plan of Care Review  Outcome: Progressing  Goal: Patient-Specific Goal (Individualized)  Outcome: Progressing  Goal: Absence of Hospital-Acquired Illness or Injury  Outcome: Progressing  Goal: Optimal Comfort and Wellbeing  Outcome: Progressing  Goal: Readiness for Transition of Care  Outcome: Progressing     Problem: Infection  Goal: Absence of Infection Signs and Symptoms  Outcome: Progressing     Problem: Diabetes Comorbidity  Goal: Blood Glucose Level Within Targeted Range  Outcome: Progressing     Problem: Bariatric Environmental Safety  Goal: Safety Maintained with Care  Outcome: Progressing     Problem: Skin Injury Risk Increased  Goal: Skin Health and Integrity  Outcome: Progressing     Problem: Anxiety Signs/Symptoms  Goal: Optimized Energy Level (Anxiety Signs/Symptoms)  Outcome: Progressing  Goal: Optimized Cognitive Function (Anxiety Signs/Symptoms)  Outcome: Progressing  Goal: Improved Mood Symptoms (Anxiety Signs/Symptoms)  Outcome: Progressing  Goal: Improved Sleep (Anxiety Signs/Symptoms)  Outcome: Progressing  Goal: Enhanced Social, Occupational or Functional Skills (Anxiety Signs/Symptoms)  Outcome: Progressing  Goal: Improved Somatic Symptoms (Anxiety Signs/Symptoms)  Outcome: Progressing

## 2025-07-17 NOTE — PROGRESS NOTES
[facility] ([address] Medicine  Progress Note    Patient Name: Indira Waters  MRN: [id number]  Patient Class: IP- Inpatient   Admission Date: 7/16/2025  Length of Stay: 0 days  Attending Physician: HEAVENLY Perez MD  Primary Care Provider: Chun Zapata MD        Subjective     Principal Problem:General weakness        HPI:  Ms. Waters is a 80 YOF with PMHx of HFrEF (EF 40-50% 06/2024), CAD ([facility] 05/2022 with severe 2 vessel, predominantly distal, disease), aortic atherosclerosis with tortuous aorta, HTN, HLD, DM type II, diabetic polyneuropathy,COPD, chronic respiratory insuffiencey/chronic airflow limitation on home oxygen qHS , former smoker, CKD III (baseline SCr 1.1-1.2), chronic anemia, history of CVA with residual left-sided deficits, GERD, fibromyalgia, chronic pain, osteoarthritis, anxiety/depression, and obesity.     She presents to ED with her daughter due to concerns for weakness, fatigue, shortness of breath, palpitations, chest discomfort, light headiness, dizziness, headaches, dysuria, nausea, vomiting, abdominal discomfort, chronic back pain, fever, chills, cough noting I just do not feel good. She denies diarrhea, constipation, hematuria, decreased UOP, blood in stool, seizures, recent falls, or syncope. She lives with her daughter, uses ambualtory aides as needed, and requires some assistance with ADLs.     In the ED she is hypertensive otherwise HDS and afebrile.  CBC with WBC 13, H/H 10.7/33, platelets 319.  Chemistry was , K 4.7, chloride 101, CO2 22, BUN 31, SCr 1.4, glucose 205.  Magnesium 2.0.  LFTs unremarkable.  .  Troponin 0.009.  TSH 1.551.  Hepatitis-C and HIV nonreactive.  UA does not appear infectious.  CXR with cardiomegaly. CT A/P with normal appearance of the kidneys, ureters and bladder; mild pancreatic atrophy.     The patient was placed in observation to the Hospital Medicine Service for further evaluation and treatment.      Overview/Hospital Course:  No notes on file    Interval History: No acute events overnight. Reports feeling worse than yesterday initially but on further questioning symptoms appear comparable. Discussed plan of care with patient and daughter at bedside. No other concerns at this time.    Review of Systems   Constitutional:  Positive for fatigue.   Respiratory:  Positive for cough and shortness of breath.    Cardiovascular:  Negative for chest pain and palpitations.   Gastrointestinal:  Positive for abdominal pain and nausea. Negative for vomiting.   Musculoskeletal:  Positive for arthralgias and myalgias.     Objective:     Vital Signs (Most Recent):  Temp: 97.8 °F (36.6 °C) (07/17/25 1924)  Pulse: 74 (07/17/25 2015)  Resp: 16 (07/17/25 2015)  BP: 131/62 (07/17/25 1924)  SpO2: 99 % (07/17/25 2016) Vital Signs (24h Range):  Temp:  [97.6 °F (36.4 °C)-98.1 °F (36.7 °C)] 97.8 °F (36.6 °C)  Pulse:  [69-75] 74  Resp:  [15-18] 16  SpO2:  [96 %-100 %] 99 %  BP: (120-162)/(57-70) 131/62     Weight: 94 kg (207 lb 3.7 oz)  Body mass index is 40.47 kg/m².    Intake/Output Summary (Last 24 hours) at 7/17/2025 2107  Last data filed at 7/17/2025 0547  Gross per 24 hour   Intake 498.64 ml   Output 550 ml   Net -51.36 ml         Physical Exam  Vitals and nursing note reviewed.   Constitutional:       General: She is not in acute distress.     Appearance: She is well-developed. She is ill-appearing (chronically).   HENT:      Head: Normocephalic and atraumatic.   Eyes:      General:         Right eye: No discharge.         Left eye: No discharge.      Conjunctiva/sclera: Conjunctivae normal.   Cardiovascular:      Rate and Rhythm: Normal rate.      Pulses: Normal pulses.   Pulmonary:      Effort: Pulmonary effort is normal. No respiratory distress.   Abdominal:      Palpations: Abdomen is soft.      Tenderness: There is no abdominal tenderness.   Musculoskeletal:         General: Normal range of motion.      Right lower leg:  Edema present.      Left lower leg: Edema present.   Skin:     General: Skin is warm and dry.   Neurological:      Mental Status: She is alert and oriented to person, place, and time.         Significant Labs:   CBC:  Recent Labs   Lab 07/16/25  1408 07/17/25  0352   WBC 12.64 11.07   HGB 10.7* 10.1*   HCT 32.9* 32.5*    295   NEUTROAUTO 66.0 55.3   LYMPH 2.73  21.6 3.09  27.9   MONO 7.8  0.98 10.3  1.14*   EOS 3.8  0.48 5.5  0.61*   BMP:  Recent Labs   Lab 07/15/25  0929 07/16/25  1408 07/17/25  0352    139 140   K 4.6 4.7 4.3    101 103   CO2 23 22* 24   BUN 30* 31* 26*   CREATININE 1.5* 1.4 1.3   * 205* 211*   CALCIUM 8.9 9.7 9.1   MG  --  2.0 2.1   PHOS 3.6  --   --      Significant Imaging: I have reviewed and interpreted all pertinent imaging results/findings within the past 24 hours.        Assessment & Plan  General weakness  Chronic pain syndrome  Fibromyalgia  - Generalized fatigue, weakness, and multiple complaints involving multiple systems.  - PT/OT evaluations. Labs overall unrevealing for acute abnormality. Clinically appears improved and in better spirits though reports significant symptoms remain. Monitor with repeat labs.  - Anticipate likely DC with home health / additional support at home to assist.  Dysuria  Nausea and vomiting  -at admission had complaints of dysuria with nausea and vomiting; UA without WBCs or RBCs.  - Outpatient urine culture collected but does not appear she had UA at that time.  - Without significant fevers, further reported dysuria suspicion for significant UTI is lower. Hold further ciprofloxacin for time being.  Chronic respiratory failure with hypoxia  CAFL (chronic airflow limitation)  Chronic bronchitis  On home oxygen therapy  COPD (chronic obstructive pulmonary disease)  Former smoker  - History of COPD with chronic resp failure on home O2.  - Continue PRN nebulizations, controller, incentive spirometry, supportive care.  Chronic  combined systolic and diastolic congestive heart failure  Essential hypertension  - Chronic; appears stable.  -continue home metoprolol and nifedipine with hold parameters   - resume losartan, torsemide tomorrow AM if creatinine remains stable.  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 pectoris  Athscl heart disease of native coronary artery w/o ang [facility] 05/2022 detailed in HPI  -continue home ASA  -hold home fenofibrate and repatha for now >> resume at DC  -no statin due to allergies  -continue tele monitoring  -EKG PRN concerns     History of CVA (cerebrovascular accident)  Hemiplegia and hemiparesis following cerebral infarction affecting left non-dominant side  -history of   -continue home ASA  -hold home fenofibrate and repatha for now >> resume at DC  -no statin due to allergies  -fall precautions     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 disease  - Chronic; variable control.  - HgbA1c 8.0.  - Continue insulin glargine at 18U subQ BID, insulin aspart 15U subQ TIDWM, low-dose sliding scale insulin aspart 0-5U subQ TIDWM PRN.  Gastroesophageal reflux disease without esophagitis  -chronic  -PRN supportive care as indicated  Stage 3 chronic kidney disease  -chronic; recent elevation, now improving.  - Monitor. Avoid nephrotoxins, renally dose medications.  Depression, unspecified  Generalized anxiety disorder  Recurrent major depressive disorder, in remission  -chronic  -continue home sertraline  -additional PRN supportive care as indicated       VTE Risk Mitigation (From admission, onward)           Ordered     heparin (porcine) injection 5,000 Units  Every 8 hours         07/16/25 1824     IP VTE HIGH RISK PATIENT  Once         07/16/25 1824     Place sequential compression device  Until discontinued         07/16/25 1824                    Discharge Planning   KYLE: 7/19/2025     Code Status: Full Code   Medical Readiness for Discharge Date:   Discharge  Plan A: Home with family          D William Perez MD  Department of Hospital Medicine   [facility] ([address])

## 2025-07-17 NOTE — ASSESSMENT & PLAN NOTE
- Generalized fatigue, weakness, and multiple complaints involving multiple systems.  - PT/OT evaluations. Labs overall unrevealing for acute abnormality. Clinically appears improved and in better spirits though reports significant symptoms remain. Monitor with repeat labs.  - Anticipate likely DC with home health / additional support at home to assist.

## 2025-07-17 NOTE — ASSESSMENT & PLAN NOTE
-history of known COPD, chronic airflow limitation, chronic respiratory failure on home oxygen, in former smoker  -stable on home oxygen supplementation >>> continue   -encourage PO intake as tolerated  -continue home combivent   -DuoNebs PRN   -incentive spirometer for pulmonary toileting   -PRN supportive care as indicated

## 2025-07-17 NOTE — ASSESSMENT & PLAN NOTE
-history of   -[facility] 05/2022 detailed in HPI  -continue home ASA  -hold home fenofibrate and repatha for now >> resume at DC  -no statin due to allergies  -continue tele monitoring  -EKG PRN concerns

## 2025-07-17 NOTE — H&P
[facility] Surg [address] Medicine  History & Physical    Patient Name: Indira Waters  MRN: [id number]  Patient Class: OP- Observation  Admission Date: 7/16/2025  Attending Physician: HEAVENLY Perez MD   Primary Care Provider: Chun Zapata MD    Patient information was obtained from patient, relative(s), past medical records, and ER records.     Subjective:     Principal Problem:General weakness    Chief Complaint:   Chief Complaint   Patient presents with    Dysuria     Being treated by Dr. Mcgee for RANJEET for past 2 months w/o improvement. Painful urination as well. Sent to ER.         HPI: Ms. Waters is a 80 YOF with PMHx of HFrEF (EF 40-50% 06/2024), CAD ([facility] 05/2022 with severe 2 vessel, predominantly distal, disease), aortic atherosclerosis with tortuous aorta, HTN, HLD, DM type II, diabetic polyneuropathy,COPD, chronic respiratory insuffiencey/chronic airflow limitation on home oxygen qHS , former smoker, CKD III (baseline SCr 1.1-1.2), chronic anemia, history of CVA with residual left-sided deficits, GERD, fibromyalgia, chronic pain, osteoarthritis, anxiety/depression, and obesity.     She presents to ED with her daughter due to concerns for weakness, fatigue, shortness of breath, palpitations, chest discomfort, light headiness, dizziness, headaches, dysuria, nausea, vomiting, abdominal discomfort, chronic back pain, fever, chills, cough noting I just do not feel good. She denies diarrhea, constipation, hematuria, decreased UOP, blood in stool, seizures, recent falls, or syncope. She lives with her daughter, uses ambualtory aides as needed, and requires some assistance with ADLs.     In the ED she is hypertensive otherwise HDS and afebrile.  CBC with WBC 13, H/H 10.7/33, platelets 319.  Chemistry was , K 4.7, chloride 101, CO2 22, BUN 31, SCr 1.4, glucose 205.  Magnesium 2.0.  LFTs unremarkable.  .  Troponin 0.009.  TSH 1.551.  Hepatitis-C and HIV nonreactive.   UA does not appear infectious.  CXR with cardiomegaly. CT A/P with normal appearance of the kidneys, ureters and bladder; mild pancreatic atrophy.     The patient was placed in observation to the Hospital Medicine Service for further evaluation and treatment.     Past Medical History:   Diagnosis Date    Arthritis     Back pain     Cancer     ovarian    Cervical cancer     Coronary artery disease     Depression     Diabetes mellitus     Fibromyalgia     Heart attack     History of MI (myocardial infarction)     Hyperlipidemia     Hypertension     Lupus     Stroke     slight left sided weakness       Past Surgical History:   Procedure Laterality Date    APPENDECTOMY       SECTION      2    CORONARY ANGIOGRAPHY N/A 2022    Procedure: ANGIOGRAM, CORONARY ARTERY;  Surgeon: Jose L Méndez MD;  Location: [facility] CATH LAB;  Service: Cardiology;  Laterality: N/A;    HYSTERECTOMY      with USO for cervical cancer    INJECTION OF ANESTHETIC AGENT AROUND NERVE Bilateral 2021    Procedure: BLOCK, NERVE, SYMPATHIC;  Surgeon: Holden Pereira MD;  Location: [facility] PAIN MGT;  Service: Pain Management;  Laterality: Bilateral;    INJECTION OF ANESTHETIC AGENT AROUND NERVE N/A 2021    Procedure: BLOCK, NERVE, SYMPATHETIC  need consent;  Surgeon: Holden Pereira MD;  Location: [facility] PAIN MGT;  Service: Pain Management;  Laterality: N/A;    DE EVAL,SWALLOW FUNCTION,CINE/VIDEO RECORD  2021         TONSILLECTOMY      TRIAL OF SPINAL CORD NERVE STIMULATOR N/A 3/8/2023    Procedure: LUMBAR SPINAL CORD STIMULATOR TRIAL NEVRO REP PATIENT STATES SHE NO LONGER TAKES PLAVIX;  Surgeon: Holden Pereira MD;  Location: [facility] PAIN MGT;  Service: Pain Management;  Laterality: N/A;       Review of patient's allergies indicates:   Allergen Reactions    Bleach (sodium hypochlorite) Shortness Of Breath    Nitrofurantoin macrocrystalline Anaphylaxis    Pcn [penicillins] Shortness Of Breath    Lipitor [atorvastatin]  Diarrhea and Rash    Nsaids (non-steroidal anti-inflammatory drug)      Tolerates aspirin      Statins-hmg-coa reductase inhibitors     Toradol [ketorolac]        No current facility-administered medications on file prior to encounter.     Current Outpatient Medications on File Prior to Encounter   Medication Sig    acetaminophen (TYLENOL) 500 MG tablet Take 2 tablets (1,000 mg total) by mouth every 8 (eight) hours as needed for Pain.    albuterol (ACCUNEB) 1.25 mg/3 mL Nebu Take 3 mLs (1.25 mg total) by nebulization every 6 (six) hours as needed (for wheezing or shortness of breath). Rescue    allopurinoL (ZYLOPRIM) 300 MG tablet Take 1 tablet (300 mg total) by mouth once daily.    aspirin (ECOTRIN) 81 MG EC tablet Take 1 tablet (81 mg total) by mouth once daily.    evolocumab (REPATHA SURECLICK) 140 mg/mL PnIj Inject 1 mL (140 mg total) into the skin every 14 (fourteen) days.    fenofibrate micronized (LOFIBRA) 134 MG Cap Take 1 capsule (134 mg total) by mouth daily with breakfast.    HYDROcodone-acetaminophen (NORCO)  mg per tablet Take 1 tablet by mouth every 8 (eight) hours as needed for Pain.    insulin glargine U-100, Lantus, (LANTUS SOLOSTAR U-100 INSULIN) 100 unit/mL (3 mL) InPn pen Inject 26 Units into the skin 2 (two) times a day.    insulin lispro (HUMALOG KWIKPEN INSULIN) 100 unit/mL pen Inject 15 Units into the skin before breakfast AND 15 Units daily with lunch AND 11 Units daily with dinner or evening meal. Plus correction scale. Max TDD 50units..    ipratropium-albuteroL (COMBIVENT RESPIMAT)  mcg/actuation inhaler Inhale 1 puff into the lungs every 6 (six) hours.    losartan (COZAAR) 50 MG tablet Take 1 tablet (50 mg total) by mouth once daily.    melatonin (MELATIN) 3 mg tablet Take 2 tablets (6 mg total) by mouth nightly as needed for Insomnia.    methocarbamoL (ROBAXIN) 500 MG Tab Take 1 tablet (500 mg total) by mouth 3 (three) times daily.    metoclopramide HCl (REGLAN) 5 MG tablet  "TAKE ONE TABLET BY MOUTH FOUR TIMES DAILY AS NEEDED FOR nausea prevention    metoprolol succinate (TOPROL-XL) 100 MG 24 hr tablet Take 1 tablet (100 mg total) by mouth once daily.    miconazole nitrate 2% (MICOTIN) 2 % Oint Apply topically 2 (two) times daily. For 10 days    NIFEdipine (PROCARDIA-XL) 30 MG (OSM) 24 hr tablet TAKE ONE TABLET BY MOUTH TWICE DAILY    nystatin (MYCOSTATIN) cream Apply topically 2 (two) times daily. Underneath Alexus's buttpaste for the rash    nystatin (MYCOSTATIN) powder Apply topically 4 (four) times daily.    pregabalin (LYRICA) 150 MG capsule Take 1 capsule (150 mg total) by mouth 2 (two) times daily.    senna-docusate 8.6-50 mg (PERICOLACE) 8.6-50 mg per tablet Take 1 tablet by mouth 2 (two) times daily as needed for Constipation.    sertraline (ZOLOFT) 100 MG tablet TAKE ONE TABLET BY MOUTH EVERY DAY    torsemide (DEMADEX) 20 MG Tab TAKE ONE TABLET BY MOUTH ONCE DAILY    BD ULTRA-FINE MICRO PEN NEEDLE 32 gauge x 1/4" Ndle Use with insulin 5 times a day.    blood sugar diagnostic Strp To check BG 4 times daily, to use with insurance preferred meter    blood-glucose meter kit To check BG 4 times daily, to use with insurance preferred meter    glucagon (BAQSIMI) 3 mg/actuation Spry 3 mg by Nasal route as needed (Severe Hypoglycemia).    lancets Misc To check BG 4 times daily, to use with insurance preferred meter    nitroGLYCERIN (NITROSTAT) 0.4 MG SL tablet DISSOLVE 1 TABLET UNDER THE TONGUE EVERY 5 MINUTES AS NEEDED FOR CHEST PAIN. DO NOT EXCEED A TOTAL OF 3 DOSES IN 15 MINUTES.    [DISCONTINUED] blood-glucose transmitter (DEXCOM G6 TRANSMITTER) Nicole 1 each by Misc.(Non-Drug; Combo Route) route continuous prn.     Family History       Problem Relation (Age of Onset)    COPD Mother    Colon cancer Maternal Grandmother    Coronary artery disease Father    Diabetes Father    Hernia Mother    Lupus Mother    Ovarian cancer Mother    Uterine cancer Mother          Tobacco Use    " Smoking status: Former     Current packs/day: 0.00     Types: Cigarettes     Quit date: 2020     Years since quittin.7     Passive exposure: Past    Smokeless tobacco: Never   Substance and Sexual Activity    Alcohol use: Not Currently     Comment: occasionally    Drug use: Yes     Types: Hydrocodone     Comment: three times a day    Sexual activity: Not Currently     Review of Systems   Constitutional:  Positive for activity change, appetite change, chills, fatigue and fever. Negative for diaphoresis and unexpected weight change.   Respiratory:  Positive for cough, chest tightness and shortness of breath. Negative for wheezing.    Cardiovascular:  Positive for chest pain, palpitations and leg swelling.   Gastrointestinal:  Positive for abdominal pain, nausea and vomiting. Negative for abdominal distention, blood in stool, constipation and diarrhea.   Genitourinary:  Positive for dysuria and urgency. Negative for decreased urine volume, difficulty urinating, flank pain and hematuria.   Musculoskeletal:  Positive for arthralgias, back pain and gait problem.   Neurological:  Positive for dizziness, weakness, light-headedness and headaches. Negative for syncope.       Objective:     Vital Signs (Most Recent):  Temp: 97.5 °F (36.4 °C) (25)  Pulse: 75 (25)  Resp: 16 (25)  BP: 134/83 (25)  SpO2: 100 % (25) Vital Signs (24h Range):  Temp:  [97.5 °F (36.4 °C)-98.4 °F (36.9 °C)] 97.5 °F (36.4 °C)  Pulse:  [69-80] 75  Resp:  [16-24] 16  SpO2:  [96 %-100 %] 100 %  BP: (134-172)/(67-83) 134/83     Weight: 89.8 kg (198 lb)  Body mass index is 37.41 kg/m².     Physical Exam  Vitals and nursing note reviewed.   Constitutional:       General: She is not in acute distress.     Appearance: Normal appearance. She is well-developed. She is obese. She is ill-appearing (chronically). She is not toxic-appearing.   HENT:      Head: Normocephalic and atraumatic.       Mouth/Throat:      Dentition: Normal dentition.   Eyes:      General: Lids are normal.      Extraocular Movements: Extraocular movements intact.      Conjunctiva/sclera: Conjunctivae normal.   Cardiovascular:      Rate and Rhythm: Regular rhythm. Tachycardia present.      Heart sounds: No murmur heard.  Pulmonary:      Effort: Pulmonary effort is normal.      Breath sounds: No wheezing or rhonchi.      Comments: Resting on nasal cannula.  No increased work of breathing or conversational dyspnea.    Abdominal:      General: Bowel sounds are normal. There is no distension.      Palpations: Abdomen is soft.      Tenderness: There is no abdominal tenderness.   Musculoskeletal:      Cervical back: Neck supple.      Right lower leg: Edema (1+ pitting mid shin to ankle) present.      Left lower leg: Edema (1+ pitting mid shin to ankle) present.   Skin:     General: Skin is warm and dry.      Findings: No erythema or rash.   Neurological:      Mental Status: She is alert and oriented to person, place, and time.             Significant Labs: All pertinent labs within the past 24 hours have been reviewed.  CBC:   Recent Labs   Lab 07/16/25  1408   WBC 12.64   HGB 10.7*   HCT 32.9*        CMP:   Recent Labs   Lab 07/15/25  0929 07/16/25  1408    139   K 4.6 4.7    101   CO2 23 22*   * 205*   BUN 30* 31*   CREATININE 1.5* 1.4   CALCIUM 8.9 9.7   PROT  --  7.8   ALBUMIN 3.7 3.8   BILITOT  --  0.2   ALKPHOS  --  60   AST  --  25   ALT  --  15   ANIONGAP 14 16     Cardiac Markers:   Recent Labs   Lab 07/16/25  1557   *     Troponin:   Recent Labs   Lab 07/16/25  1408   TROPONINI 0.009     TSH:   Recent Labs   Lab 07/16/25  1408   TSH 1.551     Urine Studies:   Recent Labs   Lab 07/16/25  1410   COLORU Colorless*   APPEARANCEUA Clear   PHUR 7.0   SPECGRAV 1.015   PROTEINUA Negative   GLUCUA Negative   BILIRUBINUA Negative   OCCULTUA Negative   NITRITE Negative   UROBILINOGEN Negative   LEUKOCYTESUR  Negative       Significant Imaging: I have reviewed all pertinent imaging results/findings within the past 24 hours.  Assessment/Plan:     Assessment & Plan  General weakness  -placed in observation due to generalized weakness with fatigue  -etiology unknown however likely multifactorial with age and co morbidities  -additional imaging/evaluation as indicated over course   -PT/OT consulted  -PRN supportive care as indicated  -fall precautions     Dysuria  Nausea and vomiting  -at admission had complaints of dysuria with nausea and vomiting   -at admission UA does not appear infectious however recent outpatient urine culture from 07/15/2025 with Enterococcus   -begin Cipro for now   -tailor/de-escalate as appropriate   -follow outpatient urine culture  -strict I&Os   -PRN supportive care as indicated    Chronic respiratory failure with hypoxia  CAFL (chronic airflow limitation)  Chronic bronchitis  On home oxygen therapy  COPD (chronic obstructive pulmonary disease)  Former smoker  -history of known COPD, chronic airflow limitation, chronic respiratory failure on home oxygen, in former smoker  -stable on home oxygen supplementation >>> continue   -encourage PO intake as tolerated  -continue home combivent   -DuoNebs PRN   -incentive spirometer for pulmonary toileting   -PRN supportive care as indicated     Chronic combined systolic and diastolic congestive heart failure  Essential hypertension  -chronic, BP elevated at admission, some LE edema on physical exam  -most recent TTE 06/2024 with EF 40-50% mild pulmonary HTN with PASP 45  -continue home metoprolol and nifedipine with hold parameters   -hold home losartan due to slight elevation in SCr >>> resume if creatinine remains stable   -continue home torsemide for now to avoid hypervolemia >>> monitor for toleration and trend renal function  -dose/medication adjustment as appropriate   -continue tele monitoring  -strict I&Os   -trend labs, address/replete  electrolytes as indicated    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 pectoris  Athscl heart disease of native coronary artery w/o ang pctrs  -history of   -[facility] 05/2022 detailed in HPI  -continue home ASA  -hold home fenofibrate and repatha for now >> resume at DC  -no statin due to allergies  -continue tele monitoring  -EKG PRN concerns     History of CVA (cerebrovascular accident)  Hemiplegia and hemiparesis following cerebral infarction affecting left non-dominant side  -history of   -continue home ASA  -hold home fenofibrate and repatha for now >> resume at DC  -no statin due to allergies  -fall precautions     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 disease  -chronic, not well controlled   -hemoglobin A1c 8.0/183 July 2025   -hold home Lantus 26 units BID and lispro 15 units with breakfast, 15 units with lunch, and 11 units with dinner plus low-dose SSI   -begin dose reduced basal and prandial insulin plus low-dose SSI  -dose/medication adjustment as appropriate   -monitor accuchecks AC/HS and PRN hypoglycemic protocol     Gastroesophageal reflux disease without esophagitis  -chronic  -PRN supportive care as indicated    Stage 3 chronic kidney disease  -chronic  -baseline SCr 1.1-1.2  -at admission SCr 1.4  -avoid nephrotoxins and hypotension as able, renally dose medications  -trend labs, address/replete electrolytes as indicated    Depression, unspecified  Generalized anxiety disorder  Recurrent major depressive disorder, in remission  -chronic  -continue home sertraline  -additional PRN supportive care as indicated     Chronic pain syndrome  Fibromyalgia  -chronic  -continue home allopurinol and lyrica   -PRN supportive care for pain  -fall precautions  -consider PT/OT consult in AM     VTE Risk Mitigation (From admission, onward)           Ordered     heparin (porcine) injection 5,000 Units  Every 8 hours         07/16/25 1824     IP VTE HIGH RISK PATIENT   Once         07/16/25 1824     Place sequential compression device  Until discontinued         07/16/25 1824                    On 07/16/2025, patient placed in hospital observation services.        Rina Blanc DNP, AG-ACNP, BC  Department of Hospital Medicine  [facility]

## 2025-07-17 NOTE — ASSESSMENT & PLAN NOTE
-placed in observation due to generalized weakness with fatigue  -etiology unknown however likely multifactorial with age and co morbidities  -additional imaging/evaluation as indicated over course   -PT/OT consulted  -PRN supportive care as indicated  -fall precautions

## 2025-07-17 NOTE — ASSESSMENT & PLAN NOTE
-chronic, not well controlled   -hemoglobin A1c 8.0/183 July 2025   -hold home Lantus 26 units BID and lispro 15 units with breakfast, 15 units with lunch, and 11 units with dinner plus low-dose SSI   -begin dose reduced basal and prandial insulin plus low-dose SSI  -dose/medication adjustment as appropriate   -monitor accuchecks AC/HS and PRN hypoglycemic protocol

## 2025-07-17 NOTE — ASSESSMENT & PLAN NOTE
-at admission had complaints of dysuria with nausea and vomiting; UA without WBCs or RBCs.  - Outpatient urine culture collected but does not appear she had UA at that time.  - Without significant fevers, further reported dysuria suspicion for significant UTI is lower. Hold further ciprofloxacin for time being.

## 2025-07-17 NOTE — ASSESSMENT & PLAN NOTE
-[facility] 05/2022 detailed in HPI  -continue home ASA  -hold home fenofibrate and repatha for now >> resume at DC  -no statin due to allergies  -continue tele monitoring  -EKG PRN concerns

## 2025-07-17 NOTE — ASSESSMENT & PLAN NOTE
-chronic; recent elevation, now improving.  - Monitor. Avoid nephrotoxins, renally dose medications.

## 2025-07-17 NOTE — ASSESSMENT & PLAN NOTE
-chronic, BP elevated at admission, some LE edema on physical exam  -most recent TTE 06/2024 with EF 40-50% mild pulmonary HTN with PASP 45  -continue home metoprolol and nifedipine with hold parameters   -hold home losartan due to slight elevation in SCr >>> resume if creatinine remains stable   -continue home torsemide for now to avoid hypervolemia >>> monitor for toleration and trend renal function  -dose/medication adjustment as appropriate   -continue tele monitoring  -strict I&Os   -trend labs, address/replete electrolytes as indicated

## 2025-07-17 NOTE — PT/OT/SLP EVAL
Physical Therapy Evaluation    Patient Name:  Indira Waters   MRN:  [id number]    Recommendations:     Discharge Recommendations: Low Intensity Therapy   Discharge Equipment Recommendations: walker, rolling   Barriers to discharge: Inaccessible home    Assessment:     Indira Waters is a 80 y.o. female admitted with a medical diagnosis of General weakness.  She presents with the following impairments/functional limitations: weakness, impaired endurance, impaired self care skills, impaired functional mobility, gait instability, impaired balance, impaired cardiopulmonary response to activity.    Pt PLOF is mod indep for ambulation with min A from daughter for ADLs. Pt currently performing bed mobility w SBA. Transferred to standing w RW and Indigo. Returned to supine in bed w SBA. Required Indigo for balance and line management to ambulate in room, fatigued quickly. At this time, pt is performing below her functional baseline and so would benefit from skilled acute PT services to address the above deficits, reduce weakness 2/2 reduced mobility, increase her level of independence and improve long-term functional outcomes. Low Intensity Therapy recommended post-acute care. Recommend RW for equipment upon d/c.      Rehab Prognosis: Good; patient would benefit from acute skilled PT services to address these deficits and reach maximum level of function.    Recent Surgery: * No surgery found *      Plan:     During this hospitalization, patient to be seen 5 x/week to address the identified rehab impairments via gait training, therapeutic activities, therapeutic exercises and progress toward the following goals:    Plan of Care Expires:  07/17/25    Subjective     Chief Complaint: fatigue  Patient/Family Comments/goals: pt agreeable to treatment  Pain/Comfort:  Pain Rating 1: other (see comments) (did not rate)  Pain Addressed 1: Reposition    Patients cultural, spiritual, Taoist conflicts given the current  situation: no    Living Environment:  Pt lives with her daughter, Esha, in [facility] with 17 LYNN, bilateral handrails though widely spaced.  Prior to admission, patients level of function was mod independent for ambulation and min A from her daughter for ADLs. She was requiring greater assist for all activities however in the week prior to admission.  Equipment used at home: cane, straight, shower chair, oxygen, rollator.  DME owned (not currently used): none.  Upon discharge, patient will have assistance from her daughter.    Objective:     Communicated with RN prior to session.  Patient found HOB elevated with telemetry, peripheral IV, PureWick, oxygen  upon PT entry to room.    General Precautions: Standard, fall, diabetic  Orthopedic Precautions:N/A   Braces: N/A  Respiratory Status: Nasal cannula, flow 2 L/min    Exams:  RLE ROM: WFL  RLE Strength: WFL  LLE ROM: WFL  LLE Strength: WFL except hip flexion 3+/5    Functional Mobility:  Bed Mobility:     Scooting: stand by assistance  Supine to Sit: supervision  Sit to Supine: modified independence  Transfers:     Sit to Stand:  stand by assistance, minimum assistance, of 1 persons, and 2 trials with rolling walker  Gait: ambulated in room x20' with RW, Indigo for safety, line management and cues  Balance: unsteady with increased sway in standing w/o AD, steady with increased trunk flexion in RW      AM-PAC 6 CLICK MOBILITY  Total Score:21       Treatment & Education:  Gait Training:  Functional mobility training with RW as noted above, guidance and cues given for body sequencing, appropriate use of DME, and safety  Increased time required due to fatigue and line management with peripheral IV and supplemental O2 (used rolling tank at 2L/min)      Patient left HOB elevated with all lines intact, call button in reach, RN notified, and daughter present.    GOALS:   Multidisciplinary Problems       Physical Therapy Goals          Problem: Physical Therapy    Goal Priority  Disciplines Outcome Interventions   Physical Therapy Goal     PT, PT/OT Progressing    Description: 1. [place] with Bed Mobility  2. Supervision with Transfers using LRAD  3. Tolerate OOB x 2 hours   4. Gait  x 150 feet with Supervision using LRAD.   5. Ascend/descend 6 stair with right Handrails Stand-by Assistance using LRAD.                          DME Justifications:  The mobility limitation cannot be sufficiently resolved by the use of a cane.   Patient's functional mobility deficit can be sufficiently resolved with the use of a rolling walker. Patient's mobility limitation significantly impairs their ability to participate in one or more activities of daily living. The use of a rolling walker will significantly improve the patient's ability to participate in MRADLS and the patient will use it on regular basis in the home.       History:     Past Medical History:   Diagnosis Date    Arthritis     Back pain     Cancer     ovarian    Cervical cancer     Coronary artery disease     Depression     Diabetes mellitus     Fibromyalgia     Heart attack     History of MI (myocardial infarction)     Hyperlipidemia     Hypertension     Lupus     Stroke     slight left sided weakness       Past Surgical History:   Procedure Laterality Date    APPENDECTOMY       SECTION      2    CORONARY ANGIOGRAPHY N/A 2022    Procedure: ANGIOGRAM, CORONARY ARTERY;  Surgeon: Jose L Méndez MD;  Location: [facility] CATH LAB;  Service: Cardiology;  Laterality: N/A;    HYSTERECTOMY      with USO for cervical cancer    INJECTION OF ANESTHETIC AGENT AROUND NERVE Bilateral 2021    Procedure: BLOCK, NERVE, SYMPATHIC;  Surgeon: Holden Pereira MD;  [facility];  Service: Pain Management;  Laterality: Bilateral;    INJECTION OF ANESTHETIC AGENT AROUND NERVE N/A 2021    Procedure: BLOCK, NERVE, SYMPATHETIC  need consent;  Surgeon: Holden Pereira MD;  Location: [facility];  Service: Pain Management;   Laterality: N/A;    AK EVAL,SWALLOW FUNCTION,CINE/VIDEO RECORD  09/09/2021         TONSILLECTOMY      TRIAL OF SPINAL CORD NERVE STIMULATOR N/A 3/8/2023    Procedure: LUMBAR SPINAL CORD STIMULATOR TRIAL NEVRO REP PATIENT STATES SHE NO LONGER TAKES PLAVIX;  Surgeon: Holden Pereira MD;  Location: [facility];  Service: Pain Management;  Laterality: N/A;       Time Tracking:     PT Received On: 07/17/25  PT Start Time: 1019     PT Stop Time: 1050  PT Total Time (min): 31 min     Billable Minutes: Evaluation 15 and Gait Training 16      07/17/2025

## 2025-07-17 NOTE — ASSESSMENT & PLAN NOTE
-at admission had complaints of dysuria with nausea and vomiting   -at admission UA does not appear infectious however recent outpatient urine culture from 07/15/2025 with Enterococcus   -begin Cipro for now   -tailor/de-escalate as appropriate   -follow outpatient urine culture  -strict I&Os   -PRN supportive care as indicated

## 2025-07-17 NOTE — PT/OT/SLP EVAL
Occupational Therapy   Evaluation and Treatment    Name: Indira Waters  MRN: [id number]  Admitting Diagnosis: General weakness  Recent Surgery: * No surgery found *      Recommendations:     Discharge Recommendations: Low Intensity Therapy  Discharge Equipment Recommendations:   (no bathing/toileting DME needs; will defer AD to PT)  Barriers to discharge:   (current functional level)    Assessment:   Initial OT eval/treat complete.  Ambulating approx. 12 feet within room and performing functional transfers CGA for steadying/safety requiring consistent cues for safe RW use including safe hand placement and maintaining RW placement throughout completion of transfers; follows instructions for safe hand placement during transitions 66% of time.  Toileting requiring MIN A for thorough back momo hygiene though able to mange Purewick panties and clean front momo hygiene in stance with steadying assist and unilateral UE support.  Hand hygiene in stance at sink with SBA for instruction on soap/paper towel dispenser use and cues for safe RW positioning.  BSC, Has SPC, shower chair, O2, and BSC.  Will defer AD needs to PT.  No bathing/toileting needs anticipated.  Recommend post acute Low Intensity therapy.  Lives with daughter that assists with ADL.  To benefit from continued acute care OT services to increase independence in self-care/functional transfers.  OT to follow.      Indira Waters is a 80 y.o. female with a medical diagnosis of General weakness.  She presents with below deficits decreasing independence in self-care/functional transfers. Performance deficits affecting function: weakness, impaired endurance, impaired self care skills, impaired functional mobility, gait instability, impaired balance, decreased upper extremity function, decreased lower extremity function, decreased safety awareness, pain, impaired cardiopulmonary response to activity.      Rehab Prognosis: Good; patient would benefit from  acute skilled OT services to address these deficits and reach maximum level of function.       Plan:     Patient to be seen 3 x/week to address the above listed problems via self-care/home management, therapeutic activities, therapeutic exercises  Plan of Care Expires: 07/31/25  Plan of Care Reviewed with: patient    Subjective     Chief Complaint: With c/o BLE calf and lower back pain.   Patient/Family Comments/goals: No goals stated at this time.     Occupational Profile:  Lives with daughter in [address] [place] apt  1 flight of stairs to reach  B-handrails; no elevator access  Bathroom setup  Tub/shower   Shower stool  Standard toilet  Has access to BSC  Uses at bedside   Ambulates with SPC at all times  Daughter assists with ADL and iADL  Equipment Used at Home: bedside commode, cane, straight, shower chair, oxygen  Assistance upon Discharge: Daughter willing/able to assist.     Pain/Comfort:  Pain Rating 1: 8/10  Location - Side 1: Bilateral  Location - Orientation 1: generalized  Location 1: calf  Pain Addressed 1: Distraction, Cessation of Activity, Nurse notified, Reposition  Pain Rating Post-Intervention 1: 8/10  Pain Rating 2: 8/10  Location - Side 2: Bilateral  Location - Orientation 2: lower  Location 2: back  Pain Addressed 2: Reposition, Distraction, Cessation of Activity, Nurse notified  Pain Rating Post-Intervention 2: 8/10    Patients cultural, spiritual, Christianity conflicts given the current situation:  (None stated.)    Objective:     Communicated with: Ayesha DOOLEY RN prior to session.  Patient found HOB elevated with peripheral IV, oxygen, PureWick upon OT entry to room.    General Precautions: Standard, fall, diabetic (strict I&O's)  Orthopedic Precautions: N/A  Braces: N/A  Respiratory Status: Nasal cannula, flow 1 L/min    Occupational Performance:    Bed Mobility:    Supine<>sit Supervision  HOB elevated  Bed rail used  Increased time  Bridging towards HOB MOD I  Bed flat   Overhead rail used      Functional Mobility/Transfers:  Sit>stand EOB>RW  Cues for safe hand placement during transition  No follow through  BUE remaining at RW  even with cues  Ambulating short household distance approx. 12 feet total   Bed>BSC>sink>bed RW CGA   For steadying/safety  Forward flexed trunk noted   Step transfer to BSC RW CGA  For steadying/safety   Cues for safe hand placement during transition  Good follow through  Step transfer to EOB RW CGA  For steadying/safety   Cues for safe hand placement during transition  Good follow through  MIN cues for maintaining RW throughout completion of transfer     Activities of Daily Living:  Toileting MIN A   Unmeasured void and medium formed BM seated at BSC  Able to manage Purewick panty and clean front momo region in stance  CGA for steadying needed  Unilateral UE support  Retrieval of wipes   Assist needed for thorough back momo hygiene   Hand hygiene in stance at sink SBA  Cues for safe RW positioning at sink  Requiring cues for soap/paper towel dispenser use     Cognitive/Visual Perceptual:  Cognitive/Psychosocial Skills:  -       Oriented to: Person, Place, Time, and Situation   -       Follows Commands/attention: mildly distracted; pleasantly talkative requiring gentle redirection to task or current conversation  -       Communication: clear/fluent  -       Memory: No Deficits noted  -       Safety awareness/insight to disability: impaired   -       Mood/Affect/Coping skills/emotional control: Cooperative and Pleasant  Visual/Perceptual:  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        Patient left HOB elevated with all lines intact, call button in reach, bed alarm on, and RN notified    GOALS:   Multidisciplinary Problems       Occupational Therapy Goals          Problem: Occupational Therapy    Goal Priority Disciplines Outcome Interventions   Occupational Therapy Goal     OT, PT/OT Progressing    Description: Goals to be met by: 2025     Patient will increase functional independence with ADLs by performing:    UE Dressing with Supervision.  LE Dressing with Supervision.  Grooming while standing at sink with Modified [place].  Toileting from bedside commode with Stand-by Assistance for hygiene and clothing management.   Toilet transfer to bedside commode with Supervision.                         DME Justifications: No bathing/toileting DME needs anticipated.  Will defer AD needs to PT.     History:     Past Medical History:   Diagnosis Date    Arthritis     Back pain     Cancer     ovarian    Cervical cancer     Coronary artery disease     Depression     Diabetes mellitus     Fibromyalgia     Heart attack     History of MI (myocardial infarction)     Hyperlipidemia     Hypertension     Lupus     Stroke     slight left sided weakness         Past Surgical History:   Procedure Laterality Date    APPENDECTOMY       SECTION      2    CORONARY ANGIOGRAPHY N/A 2022    Procedure: ANGIOGRAM, CORONARY ARTERY;  Surgeon: Jose L Méndez MD;  Location: [facility] CATH LAB;  Service: Cardiology;  Laterality: N/A;    HYSTERECTOMY      with USO for cervical cancer    INJECTION OF ANESTHETIC AGENT AROUND NERVE Bilateral 2021    Procedure: BLOCK, NERVE, SYMPATHIC;  Surgeon: Holden Pereira MD;  Location: [facility] PAIN MGT;  Service: Pain Management;  Laterality: Bilateral;    INJECTION OF ANESTHETIC AGENT AROUND NERVE N/A 2021    Procedure: BLOCK, NERVE, SYMPATHETIC  need consent;  Surgeon: Holden Pereira MD;  Location: [facility] PAIN MGT;  Service: Pain  Management;  Laterality: N/A;    IN EVAL,SWALLOW FUNCTION,CINE/VIDEO RECORD  09/09/2021         TONSILLECTOMY      TRIAL OF SPINAL CORD NERVE STIMULATOR N/A 3/8/2023    Procedure: LUMBAR SPINAL CORD STIMULATOR TRIAL NEVRO REP PATIENT STATES SHE NO LONGER TAKES PLAVIX;  Surgeon: Holden Pereira MD;  Location: [facility];  Service: Pain Management;  Laterality: N/A;       Time Tracking:     OT Date of Treatment: 07/17/25  OT Start Time: 1334  OT Stop Time: 1415  OT Total Time (min): 41 min    Billable Minutes:Evaluation 15  Self Care/Home Management 26    7/17/2025

## 2025-07-17 NOTE — ASSESSMENT & PLAN NOTE
- Chronic; variable control.  - HgbA1c 8.0.  - Continue insulin glargine at 18U subQ BID, insulin aspart 15U subQ TIDWM, low-dose sliding scale insulin aspart 0-5U subQ TIDWM PRN.

## 2025-07-17 NOTE — ASSESSMENT & PLAN NOTE
-[facility] 05/2022 detailed in HPI  -continue home ASA  -hold home fenofibrate and repatha for now >> resume at DC  -no statin due to allergies  -continue tele monitoring  -EKG PRN concerns

## 2025-07-18 PROBLEM — I50.22 CHRONIC SYSTOLIC (CONGESTIVE) HEART FAILURE: Status: ACTIVE | Noted: 2025-07-18

## 2025-07-18 PROBLEM — Z71.89 GOALS OF CARE, COUNSELING/DISCUSSION: Status: ACTIVE | Noted: 2025-07-18

## 2025-07-18 LAB
ABSOLUTE EOSINOPHIL (OHS): 0.7 K/UL
ABSOLUTE MONOCYTE (OHS): 1.03 K/UL (ref 0.3–1)
ABSOLUTE NEUTROPHIL COUNT (OHS): 5.15 K/UL (ref 1.8–7.7)
ANION GAP (OHS): 12 MMOL/L (ref 8–16)
BASOPHILS # BLD AUTO: 0.06 K/UL
BASOPHILS NFR BLD AUTO: 0.6 %
BUN SERPL-MCNC: 24 MG/DL (ref 8–23)
CALCIUM SERPL-MCNC: 9.3 MG/DL (ref 8.7–10.5)
CHLORIDE SERPL-SCNC: 103 MMOL/L (ref 95–110)
CO2 SERPL-SCNC: 25 MMOL/L (ref 23–29)
CREAT SERPL-MCNC: 1.4 MG/DL (ref 0.5–1.4)
ERYTHROCYTE [DISTWIDTH] IN BLOOD BY AUTOMATED COUNT: 15.4 % (ref 11.5–14.5)
GFR SERPLBLD CREATININE-BSD FMLA CKD-EPI: 38 ML/MIN/1.73/M2
GLUCOSE SERPL-MCNC: 113 MG/DL (ref 70–110)
HCT VFR BLD AUTO: 32.2 % (ref 37–48.5)
HGB BLD-MCNC: 10.5 GM/DL (ref 12–16)
IMM GRANULOCYTES # BLD AUTO: 0.03 K/UL (ref 0–0.04)
IMM GRANULOCYTES NFR BLD AUTO: 0.3 % (ref 0–0.5)
LIPASE SERPL-CCNC: 19 U/L (ref 4–60)
LYMPHOCYTES # BLD AUTO: 2.98 K/UL (ref 1–4.8)
MCH RBC QN AUTO: 27.2 PG (ref 27–31)
MCHC RBC AUTO-ENTMCNC: 32.6 G/DL (ref 32–36)
MCV RBC AUTO: 83 FL (ref 82–98)
NUCLEATED RBC (/100WBC) (OHS): 0 /100 WBC
PLATELET # BLD AUTO: 313 K/UL (ref 150–450)
PMV BLD AUTO: 11.5 FL (ref 9.2–12.9)
POCT GLUCOSE: 142 MG/DL (ref 70–110)
POCT GLUCOSE: 208 MG/DL (ref 70–110)
POCT GLUCOSE: 356 MG/DL (ref 70–110)
POCT GLUCOSE: 438 MG/DL (ref 70–110)
POTASSIUM SERPL-SCNC: 4.3 MMOL/L (ref 3.5–5.1)
RBC # BLD AUTO: 3.86 M/UL (ref 4–5.4)
RELATIVE EOSINOPHIL (OHS): 7 %
RELATIVE LYMPHOCYTE (OHS): 29.9 % (ref 18–48)
RELATIVE MONOCYTE (OHS): 10.4 % (ref 4–15)
RELATIVE NEUTROPHIL (OHS): 51.8 % (ref 38–73)
SODIUM SERPL-SCNC: 140 MMOL/L (ref 136–145)
WBC # BLD AUTO: 9.95 K/UL (ref 3.9–12.7)

## 2025-07-18 PROCEDURE — 94799 UNLISTED PULMONARY SVC/PX: CPT | Mod: XB

## 2025-07-18 PROCEDURE — 25500020 PHARM REV CODE 255: Performed by: INTERNAL MEDICINE

## 2025-07-18 PROCEDURE — 99497 ADVNCD CARE PLAN 30 MIN: CPT | Mod: 25,,, | Performed by: STUDENT IN AN ORGANIZED HEALTH CARE EDUCATION/TRAINING PROGRAM

## 2025-07-18 PROCEDURE — 97530 THERAPEUTIC ACTIVITIES: CPT | Mod: CQ

## 2025-07-18 PROCEDURE — 83690 ASSAY OF LIPASE: CPT | Performed by: INTERNAL MEDICINE

## 2025-07-18 PROCEDURE — 25000242 PHARM REV CODE 250 ALT 637 W/ HCPCS: Performed by: INTERNAL MEDICINE

## 2025-07-18 PROCEDURE — 99900035 HC TECH TIME PER 15 MIN (STAT)

## 2025-07-18 PROCEDURE — 94761 N-INVAS EAR/PLS OXIMETRY MLT: CPT

## 2025-07-18 PROCEDURE — 63600175 PHARM REV CODE 636 W HCPCS: Performed by: NURSE PRACTITIONER

## 2025-07-18 PROCEDURE — 80048 BASIC METABOLIC PNL TOTAL CA: CPT | Performed by: INTERNAL MEDICINE

## 2025-07-18 PROCEDURE — 97535 SELF CARE MNGMENT TRAINING: CPT

## 2025-07-18 PROCEDURE — 36415 COLL VENOUS BLD VENIPUNCTURE: CPT | Performed by: INTERNAL MEDICINE

## 2025-07-18 PROCEDURE — 99498 ADVNCD CARE PLAN ADDL 30 MIN: CPT | Mod: ,,, | Performed by: STUDENT IN AN ORGANIZED HEALTH CARE EDUCATION/TRAINING PROGRAM

## 2025-07-18 PROCEDURE — 25000003 PHARM REV CODE 250: Performed by: INTERNAL MEDICINE

## 2025-07-18 PROCEDURE — 27000221 HC OXYGEN, UP TO 24 HOURS

## 2025-07-18 PROCEDURE — 99223 1ST HOSP IP/OBS HIGH 75: CPT | Mod: 25,,, | Performed by: STUDENT IN AN ORGANIZED HEALTH CARE EDUCATION/TRAINING PROGRAM

## 2025-07-18 PROCEDURE — 85025 COMPLETE CBC W/AUTO DIFF WBC: CPT | Performed by: INTERNAL MEDICINE

## 2025-07-18 PROCEDURE — 94640 AIRWAY INHALATION TREATMENT: CPT

## 2025-07-18 PROCEDURE — 11000001 HC ACUTE MED/SURG PRIVATE ROOM

## 2025-07-18 PROCEDURE — 25000003 PHARM REV CODE 250: Performed by: NURSE PRACTITIONER

## 2025-07-18 RX ORDER — DULOXETIN HYDROCHLORIDE 20 MG/1
20 CAPSULE, DELAYED RELEASE ORAL DAILY
Status: DISCONTINUED | OUTPATIENT
Start: 2025-07-18 | End: 2025-07-22 | Stop reason: HOSPADM

## 2025-07-18 RX ORDER — ACETAMINOPHEN 500 MG
1000 TABLET ORAL EVERY 8 HOURS
Status: DISCONTINUED | OUTPATIENT
Start: 2025-07-18 | End: 2025-07-22 | Stop reason: HOSPADM

## 2025-07-18 RX ORDER — LOSARTAN POTASSIUM 25 MG/1
25 TABLET ORAL DAILY
Qty: 30 TABLET | Status: CANCELLED | OUTPATIENT
Start: 2025-07-18

## 2025-07-18 RX ADMIN — INSULIN GLARGINE 18 UNITS: 100 INJECTION, SOLUTION SUBCUTANEOUS at 09:07

## 2025-07-18 RX ADMIN — NIFEDIPINE 30 MG: 30 TABLET, FILM COATED, EXTENDED RELEASE ORAL at 09:07

## 2025-07-18 RX ADMIN — ACETAMINOPHEN 1000 MG: 500 TABLET, FILM COATED ORAL at 02:07

## 2025-07-18 RX ADMIN — HYDROCODONE BITARTRATE AND ACETAMINOPHEN 1 TABLET: 5; 325 TABLET ORAL at 06:07

## 2025-07-18 RX ADMIN — HYDROCODONE BITARTRATE AND ACETAMINOPHEN 1 TABLET: 5; 325 TABLET ORAL at 10:07

## 2025-07-18 RX ADMIN — IPRATROPIUM BROMIDE AND ALBUTEROL 1 PUFF: 20; 100 SPRAY, METERED RESPIRATORY (INHALATION) at 01:07

## 2025-07-18 RX ADMIN — METOPROLOL SUCCINATE 100 MG: 50 TABLET, EXTENDED RELEASE ORAL at 09:07

## 2025-07-18 RX ADMIN — METHOCARBAMOL 500 MG: 500 TABLET ORAL at 08:07

## 2025-07-18 RX ADMIN — SERTRALINE HYDROCHLORIDE 100 MG: 100 TABLET ORAL at 09:07

## 2025-07-18 RX ADMIN — TORSEMIDE 20 MG: 20 TABLET ORAL at 09:07

## 2025-07-18 RX ADMIN — INSULIN ASPART 5 UNITS: 100 INJECTION, SOLUTION INTRAVENOUS; SUBCUTANEOUS at 12:07

## 2025-07-18 RX ADMIN — PREGABALIN 150 MG: 75 CAPSULE ORAL at 09:07

## 2025-07-18 RX ADMIN — ALLOPURINOL 300 MG: 300 TABLET ORAL at 09:07

## 2025-07-18 RX ADMIN — IOHEXOL 75 ML: 350 INJECTION, SOLUTION INTRAVENOUS at 11:07

## 2025-07-18 RX ADMIN — MELATONIN TAB 3 MG 6 MG: 3 TAB at 08:07

## 2025-07-18 RX ADMIN — ASPIRIN 81 MG: 81 TABLET, COATED ORAL at 09:07

## 2025-07-18 RX ADMIN — DOCUSATE SODIUM AND SENNOSIDES 1 TABLET: 8.6; 5 TABLET ORAL at 08:07

## 2025-07-18 RX ADMIN — METHOCARBAMOL 500 MG: 500 TABLET ORAL at 09:07

## 2025-07-18 RX ADMIN — METHOCARBAMOL 500 MG: 500 TABLET ORAL at 02:07

## 2025-07-18 RX ADMIN — DULOXETINE HYDROCHLORIDE 20 MG: 20 CAPSULE, DELAYED RELEASE ORAL at 04:07

## 2025-07-18 RX ADMIN — NIFEDIPINE 30 MG: 30 TABLET, FILM COATED, EXTENDED RELEASE ORAL at 08:07

## 2025-07-18 RX ADMIN — INSULIN ASPART 15 UNITS: 100 INJECTION, SOLUTION INTRAVENOUS; SUBCUTANEOUS at 12:07

## 2025-07-18 RX ADMIN — HYDROXYZINE HYDROCHLORIDE 25 MG: 25 TABLET, FILM COATED ORAL at 08:07

## 2025-07-18 RX ADMIN — IPRATROPIUM BROMIDE AND ALBUTEROL 1 PUFF: 20; 100 SPRAY, METERED RESPIRATORY (INHALATION) at 07:07

## 2025-07-18 RX ADMIN — PREGABALIN 150 MG: 75 CAPSULE ORAL at 08:07

## 2025-07-18 RX ADMIN — HYDROCODONE BITARTRATE AND ACETAMINOPHEN 1 TABLET: 5; 325 TABLET ORAL at 08:07

## 2025-07-18 RX ADMIN — HEPARIN SODIUM 5000 UNITS: 5000 INJECTION INTRAVENOUS; SUBCUTANEOUS at 08:07

## 2025-07-18 RX ADMIN — IPRATROPIUM BROMIDE AND ALBUTEROL 1 PUFF: 20; 100 SPRAY, METERED RESPIRATORY (INHALATION) at 08:07

## 2025-07-18 RX ADMIN — HEPARIN SODIUM 5000 UNITS: 5000 INJECTION INTRAVENOUS; SUBCUTANEOUS at 02:07

## 2025-07-18 RX ADMIN — DOCUSATE SODIUM AND SENNOSIDES 1 TABLET: 8.6; 5 TABLET ORAL at 09:07

## 2025-07-18 RX ADMIN — INSULIN ASPART 2 UNITS: 100 INJECTION, SOLUTION INTRAVENOUS; SUBCUTANEOUS at 08:07

## 2025-07-18 RX ADMIN — ACETAMINOPHEN 1000 MG: 500 TABLET, FILM COATED ORAL at 08:07

## 2025-07-18 RX ADMIN — INSULIN ASPART 5 UNITS: 100 INJECTION, SOLUTION INTRAVENOUS; SUBCUTANEOUS at 04:07

## 2025-07-18 RX ADMIN — INSULIN GLARGINE 18 UNITS: 100 INJECTION, SOLUTION SUBCUTANEOUS at 08:07

## 2025-07-18 RX ADMIN — HEPARIN SODIUM 5000 UNITS: 5000 INJECTION INTRAVENOUS; SUBCUTANEOUS at 06:07

## 2025-07-18 RX ADMIN — INSULIN ASPART 15 UNITS: 100 INJECTION, SOLUTION INTRAVENOUS; SUBCUTANEOUS at 04:07

## 2025-07-18 RX ADMIN — HYDROCODONE BITARTRATE AND ACETAMINOPHEN 1 TABLET: 5; 325 TABLET ORAL at 02:07

## 2025-07-18 NOTE — ASSESSMENT & PLAN NOTE
- noted  - management per pain management who she follows with outpatient  - chronic back/knee pain  - noted plan for steroid knee injection next pain management appointment

## 2025-07-18 NOTE — PT/OT/SLP PROGRESS
Occupational Therapy   Treatment    Name: Indira Waters  MRN: [id number]  Admitting Diagnosis:  General weakness       Recommendations:     Discharge Recommendations: Low Intensity Therapy  Discharge Equipment Recommendations:  none  Barriers to discharge:  None    Assessment:     Indira Waters is a 80 y.o. female with a medical diagnosis of General weakness.  She presents with the following performance deficits affecting function: weakness, impaired endurance, impaired self care skills, impaired functional mobility, gait instability, impaired balance, decreased safety awareness, impaired cardiopulmonary response to activity.  Pt agreeable to participating in therapy upon arrival to room.  Pt able to perform sit <> stand transfer from chair and grooming task standing at sink with SBNILSA RW.    Overall, pt tolerated therapy well, but reported increased back pain after completing grooming task standing at sink and requested to return to bed.  Pt is making progress towards goals and would continue to benefit from skilled OT services to address problems listed above and increase independence with ADLs.  Low intensity therapy is recommended upon d/c from acute care to further address deficits and help pt improve overall functional independence.         Rehab Prognosis:  Good; patient would benefit from acute skilled OT services to address these deficits and reach maximum level of function.       Plan:     Patient to be seen 3 x/week to address the above listed problems via self-care/home management, therapeutic activities, therapeutic exercises  Plan of Care Expires: 07/31/25  Plan of Care Reviewed with: patient, daughter    Subjective     Chief Complaint: back pain  Patient/Family Comments/goals: continue getting stronger  Pain/Comfort:  Pain Rating 1: 0/10  Pain Rating Post-Intervention 2:  (increased back pain reported; did not rate)    Objective:     Communicated with: SHERINE [place]) prior to session.   Patient found up in chair with peripheral IV, oxygen, PureWick upon OT entry to room.  *Daughter present during session    General Precautions: Standard, diabetic, fall    Orthopedic Precautions:N/A  Braces: N/A  Respiratory Status: Nasal cannula, flow 2 L/min     Occupational Performance:     Bed Mobility:    Not assessed 2* pt up in chair upon arrival    Functional Mobility/Transfers:  Sit <> stand: SBA, RW x 1 trial from chair  Functional Mobility: Pt ambulated ~16 ft with SBA, RW.  No instances of LOB noted    Activities of Daily Living:  Grooming: stand by assistance for washing hands while standing at sink.  Cues for RW placement provided      [facility] 6 Click ADL: 19    Treatment & Education:  *Pt educated on role of OT in acute care setting  *Pt performed sit <> stand transfer from chair; cues for technique provided  *Pt ambulated within room to address coordination, endurance, and balance needed for functional mobility.  *Pt performed grooming tasks standing at sink; cues for RW placement provided  *POC reviewed with pt    Patient left HOB elevated with all lines intact and call button in reach; SCDs on, daughter present    GOALS:   Multidisciplinary Problems       Occupational Therapy Goals          Problem: Occupational Therapy    Goal Priority Disciplines Outcome Interventions   Occupational Therapy Goal     OT, PT/OT Progressing    Description: Goals to be met by: 7/31/2025     Patient will increase functional independence with ADLs by performing:    UE Dressing with Supervision.  LE Dressing with Supervision.  Grooming while standing at sink with Modified [place].  Toileting from bedside commode with Stand-by Assistance for hygiene and clothing management.   Toilet transfer to bedside commode with Supervision.                         DME Justifications:  No DME recommended requiring DME justifications    Time Tracking:     OT Date of Treatment: 07/18/25  OT Start Time: 1347  OT Stop Time: 1402  OT  Total Time (min): 15 min    Billable Minutes:Self Care/Home Management 15    OT/AMANDA: OT        BRENDA Austin  7/18/2025

## 2025-07-18 NOTE — ASSESSMENT & PLAN NOTE
- noted hx  - noted on sertraline which patient doesn't feel has been overly effective-  - has had some issues with decreased motivation, but denies feeling depressed  [] recommend consideration for initaiton of duloxetine 20mg daily, with plan for possible continued uptitration of duloxetine overtime and future de-escalation of sertraline. They are aware of need for monitoring how she does while on both of them. Can be further managed by pcp/pain management if in agreement. Duloxetine can help vs fibromyalgia and mood stabilization.

## 2025-07-18 NOTE — SUBJECTIVE & OBJECTIVE
Interval History: No acute events overnight. Reports feeling worse than yesterday initially but on further questioning symptoms appear comparable. Discussed plan of care with patient and daughter at bedside. No other concerns at this time.    Review of Systems   Constitutional:  Positive for fatigue.   Respiratory:  Positive for cough and shortness of breath.    Cardiovascular:  Negative for chest pain and palpitations.   Gastrointestinal:  Positive for abdominal pain and nausea. Negative for vomiting.   Musculoskeletal:  Positive for arthralgias and myalgias.     Objective:     Vital Signs (Most Recent):  Temp: 97.8 °F (36.6 °C) (07/17/25 1924)  Pulse: 74 (07/17/25 2015)  Resp: 16 (07/17/25 2015)  BP: 131/62 (07/17/25 1924)  SpO2: 99 % (07/17/25 2016) Vital Signs (24h Range):  Temp:  [97.6 °F (36.4 °C)-98.1 °F (36.7 °C)] 97.8 °F (36.6 °C)  Pulse:  [69-75] 74  Resp:  [15-18] 16  SpO2:  [96 %-100 %] 99 %  BP: (120-162)/(57-70) 131/62     Weight: 94 kg (207 lb 3.7 oz)  Body mass index is 40.47 kg/m².    Intake/Output Summary (Last 24 hours) at 7/17/2025 2107  Last data filed at 7/17/2025 0547  Gross per 24 hour   Intake 498.64 ml   Output 550 ml   Net -51.36 ml         Physical Exam  Vitals and nursing note reviewed.   Constitutional:       General: She is not in acute distress.     Appearance: She is well-developed. She is ill-appearing (chronically).   HENT:      Head: Normocephalic and atraumatic.   Eyes:      General:         Right eye: No discharge.         Left eye: No discharge.      Conjunctiva/sclera: Conjunctivae normal.   Cardiovascular:      Rate and Rhythm: Normal rate.      Pulses: Normal pulses.   Pulmonary:      Effort: Pulmonary effort is normal. No respiratory distress.   Abdominal:      Palpations: Abdomen is soft.      Tenderness: There is no abdominal tenderness.   Musculoskeletal:         General: Normal range of motion.      Right lower leg: Edema present.      Left lower leg: Edema present.    Skin:     General: Skin is warm and dry.   Neurological:      Mental Status: She is alert and oriented to person, place, and time.         Significant Labs:   CBC:  Recent Labs   Lab 07/16/25  1408 07/17/25  0352   WBC 12.64 11.07   HGB 10.7* 10.1*   HCT 32.9* 32.5*    295   NEUTROAUTO 66.0 55.3   LYMPH 2.73  21.6 3.09  27.9   MONO 7.8  0.98 10.3  1.14*   EOS 3.8  0.48 5.5  0.61*   BMP:  Recent Labs   Lab 07/15/25  0929 07/16/25  1408 07/17/25  0352    139 140   K 4.6 4.7 4.3    101 103   CO2 23 22* 24   BUN 30* 31* 26*   CREATININE 1.5* 1.4 1.3   * 205* 211*   CALCIUM 8.9 9.7 9.1   MG  --  2.0 2.1   PHOS 3.6  --   --      Significant Imaging: I have reviewed and interpreted all pertinent imaging results/findings within the past 24 hours.

## 2025-07-18 NOTE — PLAN OF CARE
Problem: Infection  Goal: Absence of Infection Signs and Symptoms  Outcome: Progressing     Problem: Skin Injury Risk Increased  Goal: Skin Health and Integrity  Outcome: Progressing     Problem: Anxiety Signs/Symptoms  Goal: Optimized Energy Level (Anxiety Signs/Symptoms)  Outcome: Progressing     Problem: Coping Ineffective  Goal: Effective Coping  Outcome: Progressing

## 2025-07-18 NOTE — PT/OT/SLP PROGRESS
Physical Therapy Treatment    Patient Name:  Indira Waters   MRN:  [id number]    Recommendations:     Discharge Recommendations: Low Intensity Therapy  Discharge Equipment Recommendations: none  Barriers to discharge: None    Assessment:     Indira Waters is a 80 y.o. female admitted with a medical diagnosis of General weakness.  She presents with the following impairments/functional limitations: weakness, impaired endurance, impaired self care skills, impaired functional mobility, gait instability, impaired balance, decreased safety awareness, impaired cardiopulmonary response to activity ;pt with good mobility today, amb'd short distance to transport w/c in hallway w/ RW and CGA. Limited session 2/2 going for CT scan.    Rehab Prognosis: Good; patient would benefit from acute skilled PT services to address these deficits and reach maximum level of function.    Recent Surgery: * No surgery found *      Plan:     During this hospitalization, patient to be seen 5 x/week to address the identified rehab impairments via gait training, therapeutic activities, therapeutic exercises and progress toward the following goals:    Plan of Care Expires:  07/31/25    Subjective     Chief Complaint: none stated  Patient/Family Comments/goals: pt agreeable to session, daughter present.   Pain/Comfort:  Pain Rating 1:  (did not report pain at this time)      Objective:     Communicated with nurse prior to session.  Patient found HOB elevated with peripheral IV, oxygen, PureWick upon PT entry to room.     General Precautions: Standard, diabetic, fall  Orthopedic Precautions: N/A  Braces: N/A  Respiratory Status: Nasal cannula, flow 2 L/min     Functional Mobility:  Bed Mobility:     Supine to Sit: minimum assistance and w/ HOB elevated and pt using bedrail  Transfers:     Sit to Stand:  stand by assistance and contact guard assistance with rolling walker  Gait: amb'd 15' w/ RW and CGA (short distance 2/2 pt leaving for  test)      AM-PAC 6 CLICK MOBILITY  Turning over in bed (including adjusting bedclothes, sheets and blankets)?: 4  Sitting down on and standing up from a chair with arms (e.g., wheelchair, bedside commode, etc.): 4  Moving from lying on back to sitting on the side of the bed?: 4  Moving to and from a bed to a chair (including a wheelchair)?: 3  Need to walk in hospital room?: 3  Climbing 3-5 steps with a railing?: 3  Basic Mobility Total Score: 21       Treatment & Education:      Patient left in w/c with transport present..    GOALS:   Multidisciplinary Problems       Physical Therapy Goals          Problem: Physical Therapy    Goal Priority Disciplines Outcome Interventions   Physical Therapy Goal     PT, PT/OT Progressing    Description: 1. [place] with Bed Mobility  2. Supervision with Transfers using LRAD  3. Tolerate OOB x 2 hours   4. Gait  x 150 feet with Supervision using LRAD.   5. Ascend/descend 6 stair with right Handrails Stand-by Assistance using LRAD.                          DME Justifications:      Time Tracking:     PT Received On: 07/18/25  PT Start Time: 1140     PT Stop Time: 1148  PT Total Time (min): 8 min     Billable Minutes: Therapeutic Activity 8    Treatment Type: Treatment  PT/PTA: PTA     Number of PTA visits since last PT visit: 1 07/18/2025

## 2025-07-18 NOTE — CONSULTS
[facility] ([address])  Palliative Medicine  Consult Note    Patient Name: Indira Waters  MRN: [id number]  Admission Date: 7/16/2025  Hospital Length of Stay: 1 days  Code Status: Full Code   Attending Provider: HEAVENLY Perez MD  Consulting Provider: Errol Pedersen MD  Primary Care Physician: Chun Zapata MD  Principal Problem:General weakness    Patient information was obtained from patient, relative(s), past medical records, and primary team.      Inpatient consult to Palliative Care  Consult performed by: Errol Pedersen MD  Consult ordered by: HEAVENLY Perez MD  Reason for consult: support/ACP        Assessment/Plan:     Neuro  Chronic pain syndrome  - noted  - management per pain management who she follows with outpatient  - chronic back/knee pain  - noted plan for steroid knee injection next pain management appointment    Psychiatric  Depression, unspecified  - noted hx  - noted on sertraline which patient doesn't feel has been overly effective-  - has had some issues with decreased motivation, but denies feeling depressed  [] recommend consideration for initaiton of duloxetine 20mg daily, with plan for possible continued uptitration of duloxetine overtime and future de-escalation of sertraline. They are aware of need for monitoring how she does while on both of them. Can be further managed by pcp/pain management if in agreement. Duloxetine can help vs fibromyalgia and mood stabilization.     Pulmonary  COPD (chronic obstructive pulmonary disease)  - noted on 2L O2 at home chronically  - management per primary team    Cardiac/Vascular  Chronic systolic (congestive) heart failure  - noted hx  - noted last echo EF 40%  - management per primary team    Renal/  Stage 3 chronic kidney disease  - noted hx  - management per primary team    Orthopedic  Fibromyalgia  - noted hx  - follows closely with pain management, next appt early 8/2025  - noted on lyrica, methocarbamol, norco  "  - management of chronic pain/fibromyalgia is per pain management  - noted used to follow with rheumatology, with last seen 2022 when they recommended consideration for duloxetine for her fibromyalgia/chronic pain syndrome  [] recommend consideration for initaiton of duloxetine 20mg daily, with plan for possible continued uptitration of duloxetine overtime and future de-escalation of sertraline. Can be further managed by pcp/pain management if in agreement.     Palliative Care  Goals of care, counseling/discussion  7/18/2025:  - chart reviewed in depth  - discussed extensively with primary team  - patient seen at bedside where she is joined by her daughter Esha  - introduced self and role. Sat down and talked with them.   - she shared more about herself and her life  - lives at home with her daughter Esha. It is clear how much love they have for one another  - patient well supported by daughter and leans on her daughter to answer questions at times.   - they have noted that she has been more unmotivated and not quite feeling like herself, which led to this admission  - seems her chronic pain has been playing a role on her more unmotivated, less active state  - she follows closely with pain management with next appointment coming up in 2 weeks   - we discussed her mood/pain extensively  - reflected on past recommendations by rheumatology and consideration for change in depression medication given dual effect of duloxetine as it may help vs mood + pain. Patient and daughter open to this adjustment in hopes of its improvement  - discussed consideration for initaiton of duloxetine 20mg daily, with plan for possible continued uptitration of duloxetine overtime and future de-escalation of sertraline. Can be further managed by pcp/pain management on discharge.   - discussed role of medications being "band-aids" and not actual fixes for underlying causes of her symptoms.   - discussed her medical on bay and " "associated concerns for progressive nature given COPD and HFrEF.   - we discussed the importance of routine and the correct mindset  - she shared her goal/wish is for living and getting better. She has the desire to get on track.   - discussed extensively her poor sleep hygiene and need for improved sleep and role it plays in all facets of how one feels  - encouraged not falling asleep to TV, limiting usage for 30 min to hour before sleeping. Wind down routine. Limiting water/food intake 2-3 hours before sleeping. Sleep routine/consistent schedule.   - discussed role of exercise/aerobics such as swimming to help get her more active, facilitate weight loss and improve strength/resilience.   - she seemed open to considering these things especially since swimming has less pressure on her joints  - shared importance of continued close f/u with pain management for management of her chronic pain.   - allowed them a safe space to share and vent  - provided significant emotional support and listening ear   - shared role of home palliative for continued support on discharge in setting of COPD and HF and overall functional decline.   - updated primary team regarding conversation   [] continue with current full management. Her hope/goal is for getting on track medically and seems motivated to do so.   [] recommend close f/u with pain management on discharge. ? Consideration for rheum re-involvement for fibromyalgia, noted in pain management note.   [] noted HCPOA form on file outlining her daughter Esha as HCPOA  [] recommend home palliative medicine on discharge for continued support.         Thank you for your consult. I will follow-up with patient. Please contact us if you have any additional questions.    Subjective:     HPI:   Per HPI: "Ms. Waters is a 80 YOF with PMHx of HFrEF (EF 40-50% 06/2024), CAD ([facility] 05/2022 with severe 2 vessel, predominantly distal, disease), aortic atherosclerosis with tortuous aorta, HTN, HLD, " "DM type II, diabetic polyneuropathy,COPD, chronic respiratory insuffiencey/chronic airflow limitation on home oxygen qHS , former smoker, CKD III (baseline SCr 1.1-1.2), chronic anemia, history of CVA with residual left-sided deficits, GERD, fibromyalgia, chronic pain, osteoarthritis, anxiety/depression, and obesity. She presents to ED with her daughter due to concerns for weakness, fatigue, shortness of breath, palpitations, chest discomfort, light headiness, dizziness, headaches, dysuria, nausea, vomiting, abdominal discomfort, chronic back pain, fever, chills, cough noting I just do not feel good. She denies diarrhea, constipation, hematuria, decreased UOP, blood in stool, seizures, recent falls, or syncope. She lives with her daughter, uses ambualtory aides as needed, and requires some assistance with ADLs. In the ED she is hypertensive otherwise HDS and afebrile.  CBC with WBC 13, H/H 10.7/33, platelets 319.  Chemistry was , K 4.7, chloride 101, CO2 22, BUN 31, SCr 1.4, glucose 205.  Magnesium 2.0.  LFTs unremarkable.  .  Troponin 0.009.  TSH 1.551.  Hepatitis-C and HIV nonreactive.  UA does not appear infectious.  CXR with cardiomegaly. CT A/P with normal appearance of the kidneys, ureters and bladder; mild pancreatic atrophy. The patient was placed in observation to the Hospital Medicine Service for further evaluation and treatment. "    Palliative medicine consulted for assistance with support/ACP.     Hospital Course:  No notes on file      Past Medical History:   Diagnosis Date    Arthritis     Back pain     Cancer     ovarian    Cervical cancer     Coronary artery disease     Depression     Diabetes mellitus     Fibromyalgia     Heart attack     History of MI (myocardial infarction)     Hyperlipidemia     Hypertension     Lupus     Stroke     slight left sided weakness       Past Surgical History:   Procedure Laterality Date    APPENDECTOMY       SECTION      2    CORONARY " ANGIOGRAPHY N/A 05/06/2022    Procedure: ANGIOGRAM, CORONARY ARTERY;  Surgeon: Jose L Méndez MD;  Location: [facility] CATH LAB;  Service: Cardiology;  Laterality: N/A;    HYSTERECTOMY      with USO for cervical cancer    INJECTION OF ANESTHETIC AGENT AROUND NERVE Bilateral 05/05/2021    Procedure: BLOCK, NERVE, SYMPATHIC;  Surgeon: Holden Pereira MD;  Location: [facility] PAIN MGT;  Service: Pain Management;  Laterality: Bilateral;    INJECTION OF ANESTHETIC AGENT AROUND NERVE N/A 08/25/2021    Procedure: BLOCK, NERVE, SYMPATHETIC  need consent;  Surgeon: Holden Pereira MD;  Location: [facility] PAIN MGT;  Service: Pain Management;  Laterality: N/A;    MN EVAL,SWALLOW FUNCTION,CINE/VIDEO RECORD  09/09/2021         TONSILLECTOMY      TRIAL OF SPINAL CORD NERVE STIMULATOR N/A 3/8/2023    Procedure: LUMBAR SPINAL CORD STIMULATOR TRIAL NEVRO REP PATIENT STATES SHE NO LONGER TAKES PLAVIX;  Surgeon: Holden Pereira MD;  Location: [facility] PAIN MGT;  Service: Pain Management;  Laterality: N/A;       Review of patient's allergies indicates:   Allergen Reactions    Bleach (sodium hypochlorite) Shortness Of Breath    Nitrofurantoin macrocrystalline Anaphylaxis    Pcn [penicillins] Shortness Of Breath    Lipitor [atorvastatin] Diarrhea and Rash    Nsaids (non-steroidal anti-inflammatory drug)      Tolerates aspirin      Statins-hmg-coa reductase inhibitors     Toradol [ketorolac]        Medications:  Continuous Infusions:  Scheduled Meds:   acetaminophen  1,000 mg Oral Q8H    allopurinoL  300 mg Oral Daily    aspirin  81 mg Oral Daily    heparin (porcine)  5,000 Units Subcutaneous Q8H    insulin aspart U-100  15 Units Subcutaneous TIDWM    insulin glargine U-100  18 Units Subcutaneous BID    ipratropium-albuteroL  1 puff Inhalation Q6H    methocarbamoL  500 mg Oral TID    metoprolol succinate  100 mg Oral Daily    NIFEdipine  30 mg Oral BID    pregabalin  150 mg Oral BID    senna-docusate  1 tablet Oral BID    sertraline  100 mg Oral  Daily    torsemide  20 mg Oral Daily     PRN Meds:  Current Facility-Administered Medications:     albuterol-ipratropium, 3 mL, Nebulization, Q4H PRN    dextrose 50%, 12.5 g, Intravenous, PRN    dextrose 50%, 25 g, Intravenous, PRN    glucagon (human recombinant), 1 mg, Intramuscular, PRN    glucose, 16 g, Oral, PRN    glucose, 24 g, Oral, PRN    hydrALAZINE, 10 mg, Intravenous, Q6H PRN    HYDROcodone-acetaminophen, 1 tablet, Oral, Q4H PRN    hydrOXYzine HCL, 25 mg, Oral, Q4H PRN    insulin aspart U-100, 0-5 Units, Subcutaneous, QID (AC + HS) PRN    melatonin, 6 mg, Oral, Nightly PRN    ondansetron, 8 mg, Oral, Q6H PRN    ondansetron, 8 mg, Intravenous, Q6H PRN    polyethylene glycol, 17 g, Oral, BID PRN    sodium chloride 0.9%, 10 mL, Intravenous, PRN    Family History       Problem Relation (Age of Onset)    COPD Mother    Colon cancer Maternal Grandmother    Coronary artery disease Father    Diabetes Father    Hernia Mother    Lupus Mother    Ovarian cancer Mother    Uterine cancer Mother          Tobacco Use    Smoking status: Former     Current packs/day: 0.00     Types: Cigarettes     Quit date: 2020     Years since quittin.7     Passive exposure: Past    Smokeless tobacco: Never   Substance and Sexual Activity    Alcohol use: Not Currently     Comment: occasionally    Drug use: Yes     Types: Hydrocodone     Comment: three times a day    Sexual activity: Not Currently       Objective:     Vital Signs (Most Recent):  Temp: 98.2 °F (36.8 °C) (25 1224)  Pulse: 80 (25 1309)  Resp: 16 (25 1309)  BP: 132/60 (25 1224)  SpO2: 98 % (25 1309) Vital Signs (24h Range):  Temp:  [97.6 °F (36.4 °C)-98.2 °F (36.8 °C)] 98.2 °F (36.8 °C)  Pulse:  [70-80] 80  Resp:  [14-20] 16  SpO2:  [96 %-100 %] 98 %  BP: (110-152)/(56-70) 132/60     Weight: 94 kg (207 lb 3.7 oz)  Body mass index is 40.47 kg/m².       Physical Exam  Vitals and nursing note reviewed.   Constitutional:       General: She is  not in acute distress.  HENT:      Head: Normocephalic and atraumatic.   Eyes:      Extraocular Movements: Extraocular movements intact.   Pulmonary:      Comments: NC in place  Neurological:      Mental Status: She is alert.      Comments: Awake, alert, conversant. Good insight            Review of Symptoms        Living Arrangements:  Lives with family    Psychosocial/Cultural:   See Palliative Psychosocial Note: Yes  - lives at home with her daughter Esha  - used to work at [employer] and then [employer]. Retired around 15 years ago   - enjoys watching tv and spending time with her daughter Esha  **Primary  to Follow**  Palliative Care  Consult: No        Advance Care Planning  Advance Directives:   Medical Power of : Yes      Decision Making:  Patient answered questions  Goals of Care: The patient endorses that what is most important right now is to focus on improvement of condition.     Accordingly, we have decided that the best plan to meet the patient's goals includes continuing with treatment         Significant Labs: reviewed  CBC:   Recent Labs   Lab 07/18/25  0558   WBC 9.95   HGB 10.5*   HCT 32.2*   MCV 83        BMP:  Recent Labs   Lab 07/18/25  0558   *      K 4.3      CO2 25   BUN 24*   CREATININE 1.4   CALCIUM 9.3     LFT:  Lab Results   Component Value Date    AST 17 07/17/2025    ALKPHOS 54 07/17/2025    BILITOT 0.2 07/17/2025     Albumin:   Albumin   Date Value Ref Range Status   07/17/2025 3.4 (L) 3.5 - 5.2 g/dL Final   03/06/2025 4.0 3.5 - 5.2 g/dL Final     Protein:   Protein Total   Date Value Ref Range Status   07/17/2025 7.0 6.0 - 8.4 gm/dL Final     Total Protein   Date Value Ref Range Status   03/06/2025 8.2 6.0 - 8.4 g/dL Final     Lactic acid:   Lab Results   Component Value Date    LACTATE 1.0 06/23/2024    LACTATE 0.9 06/23/2024       Significant Imaging: reviewed    70 minutes face to face time in discussion of symptom  assessment, coordination of care, exploring burden/ benefit of various approaches of treatment and discharge planning.  This also includes non-face to face time preparing to see the patient (eg, review of tests/imaging), obtaining and/or reviewing separately obtained history, documenting clinical information in the electronic or other health record, independently interpreting results and communicating results to the patient/family/caregiver, or care coordinator.     50 minutes ACP time spent: goals of care, emotional support, formulating goals and communication of prognosis      Errol Pedersen MD  Palliative Medicine  [facility] ([address])

## 2025-07-18 NOTE — ASSESSMENT & PLAN NOTE
- noted hx  - follows closely with pain management, next appt early 8/2025  - noted on lyrica, methocarbamol, norco   - management of chronic pain/fibromyalgia is per pain management  - noted used to follow with rheumatology, with last seen 2022 when they recommended consideration for duloxetine for her fibromyalgia/chronic pain syndrome  [] recommend consideration for initaiton of duloxetine 20mg daily, with plan for possible continued uptitration of duloxetine overtime and future de-escalation of sertraline. Can be further managed by pcp/pain management if in agreement.

## 2025-07-18 NOTE — PT/OT/SLP PROGRESS
Occupational Therapy      Patient Name:  Indira Waters   MRN:  [id number]    Patient not seen today secondary to off floor for CT scan  . Will follow-up as schedule permits.    BRENDA Austin  7/18/2025

## 2025-07-18 NOTE — SUBJECTIVE & OBJECTIVE
Past Medical History:   Diagnosis Date    Arthritis     Back pain     Cancer     ovarian    Cervical cancer     Coronary artery disease     Depression     Diabetes mellitus     Fibromyalgia     Heart attack     History of MI (myocardial infarction)     Hyperlipidemia     Hypertension     Lupus     Stroke     slight left sided weakness       Past Surgical History:   Procedure Laterality Date    APPENDECTOMY       SECTION      2    CORONARY ANGIOGRAPHY N/A 2022    Procedure: ANGIOGRAM, CORONARY ARTERY;  Surgeon: Jose L Méndez MD;  Location: [facility] CATH LAB;  Service: Cardiology;  Laterality: N/A;    HYSTERECTOMY      with USO for cervical cancer    INJECTION OF ANESTHETIC AGENT AROUND NERVE Bilateral 2021    Procedure: BLOCK, NERVE, SYMPATHIC;  Surgeon: Holden Pereira MD;  Location: [facility] PAIN MGT;  Service: Pain Management;  Laterality: Bilateral;    INJECTION OF ANESTHETIC AGENT AROUND NERVE N/A 2021    Procedure: BLOCK, NERVE, SYMPATHETIC  need consent;  Surgeon: Holden Pereira MD;  Location: [facility] PAIN MGT;  Service: Pain Management;  Laterality: N/A;    AR EVAL,SWALLOW FUNCTION,CINE/VIDEO RECORD  2021         TONSILLECTOMY      TRIAL OF SPINAL CORD NERVE STIMULATOR N/A 3/8/2023    Procedure: LUMBAR SPINAL CORD STIMULATOR TRIAL NEVRO REP PATIENT STATES SHE NO LONGER TAKES PLAVIX;  Surgeon: Holden Pereira MD;  Location: [facility] PAIN MGT;  Service: Pain Management;  Laterality: N/A;       Review of patient's allergies indicates:   Allergen Reactions    Bleach (sodium hypochlorite) Shortness Of Breath    Nitrofurantoin macrocrystalline Anaphylaxis    Pcn [penicillins] Shortness Of Breath    Lipitor [atorvastatin] Diarrhea and Rash    Nsaids (non-steroidal anti-inflammatory drug)      Tolerates aspirin      Statins-hmg-coa reductase inhibitors     Toradol [ketorolac]        Medications:  Continuous Infusions:  Scheduled Meds:   acetaminophen  1,000 mg Oral Q8H    allopurinoL   300 mg Oral Daily    aspirin  81 mg Oral Daily    heparin (porcine)  5,000 Units Subcutaneous Q8H    insulin aspart U-100  15 Units Subcutaneous TIDWM    insulin glargine U-100  18 Units Subcutaneous BID    ipratropium-albuteroL  1 puff Inhalation Q6H    methocarbamoL  500 mg Oral TID    metoprolol succinate  100 mg Oral Daily    NIFEdipine  30 mg Oral BID    pregabalin  150 mg Oral BID    senna-docusate  1 tablet Oral BID    sertraline  100 mg Oral Daily    torsemide  20 mg Oral Daily     PRN Meds:  Current Facility-Administered Medications:     albuterol-ipratropium, 3 mL, Nebulization, Q4H PRN    dextrose 50%, 12.5 g, Intravenous, PRN    dextrose 50%, 25 g, Intravenous, PRN    glucagon (human recombinant), 1 mg, Intramuscular, PRN    glucose, 16 g, Oral, PRN    glucose, 24 g, Oral, PRN    hydrALAZINE, 10 mg, Intravenous, Q6H PRN    HYDROcodone-acetaminophen, 1 tablet, Oral, Q4H PRN    hydrOXYzine HCL, 25 mg, Oral, Q4H PRN    insulin aspart U-100, 0-5 Units, Subcutaneous, QID (AC + HS) PRN    melatonin, 6 mg, Oral, Nightly PRN    ondansetron, 8 mg, Oral, Q6H PRN    ondansetron, 8 mg, Intravenous, Q6H PRN    polyethylene glycol, 17 g, Oral, BID PRN    sodium chloride 0.9%, 10 mL, Intravenous, PRN    Family History       Problem Relation (Age of Onset)    COPD Mother    Colon cancer Maternal Grandmother    Coronary artery disease Father    Diabetes Father    Hernia Mother    Lupus Mother    Ovarian cancer Mother    Uterine cancer Mother          Tobacco Use    Smoking status: Former     Current packs/day: 0.00     Types: Cigarettes     Quit date: 2020     Years since quittin.7     Passive exposure: Past    Smokeless tobacco: Never   Substance and Sexual Activity    Alcohol use: Not Currently     Comment: occasionally    Drug use: Yes     Types: Hydrocodone     Comment: three times a day    Sexual activity: Not Currently       Objective:     Vital Signs (Most Recent):  Temp: 98.2 °F (36.8 °C) (25  1224)  Pulse: 80 (07/18/25 1309)  Resp: 16 (07/18/25 1309)  BP: 132/60 (07/18/25 1224)  SpO2: 98 % (07/18/25 1309) Vital Signs (24h Range):  Temp:  [97.6 °F (36.4 °C)-98.2 °F (36.8 °C)] 98.2 °F (36.8 °C)  Pulse:  [70-80] 80  Resp:  [14-20] 16  SpO2:  [96 %-100 %] 98 %  BP: (110-152)/(56-70) 132/60     Weight: 94 kg (207 lb 3.7 oz)  Body mass index is 40.47 kg/m².       Physical Exam  Vitals and nursing note reviewed.   Constitutional:       General: She is not in acute distress.  HENT:      Head: Normocephalic and atraumatic.   Eyes:      Extraocular Movements: Extraocular movements intact.   Pulmonary:      Comments: NC in place  Neurological:      Mental Status: She is alert.      Comments: Awake, alert, conversant. Good insight            Review of Symptoms        Living Arrangements:  Lives with family    Psychosocial/Cultural:   See Palliative Psychosocial Note: Yes  - lives at home with her daughter Esha  - used to work at [employer] and then [employer]. Retired around 15 years ago   - enjoys watching tv and spending time with her daughter Esha  **Primary  to Follow**  Palliative Care  Consult: No        Advance Care Planning   Advance Directives:   Medical Power of : Yes      Decision Making:  Patient answered questions  Goals of Care: The patient endorses that what is most important right now is to focus on improvement of condition.     Accordingly, we have decided that the best plan to meet the patient's goals includes continuing with treatment         Significant Labs: reviewed  CBC:   Recent Labs   Lab 07/18/25  0558   WBC 9.95   HGB 10.5*   HCT 32.2*   MCV 83        BMP:  Recent Labs   Lab 07/18/25  0558   *      K 4.3      CO2 25   BUN 24*   CREATININE 1.4   CALCIUM 9.3     LFT:  Lab Results   Component Value Date    AST 17 07/17/2025    ALKPHOS 54 07/17/2025    BILITOT 0.2 07/17/2025     Albumin:   Albumin   Date Value Ref Range Status    07/17/2025 3.4 (L) 3.5 - 5.2 g/dL Final   03/06/2025 4.0 3.5 - 5.2 g/dL Final     Protein:   Protein Total   Date Value Ref Range Status   07/17/2025 7.0 6.0 - 8.4 gm/dL Final     Total Protein   Date Value Ref Range Status   03/06/2025 8.2 6.0 - 8.4 g/dL Final     Lactic acid:   Lab Results   Component Value Date    LACTATE 1.0 06/23/2024    LACTATE 0.9 06/23/2024       Significant Imaging: reviewed

## 2025-07-18 NOTE — HPI
"Per HPI: "Ms. Waters is a 80 YOF with PMHx of HFrEF (EF 40-50% 06/2024), CAD ([facility] 05/2022 with severe 2 vessel, predominantly distal, disease), aortic atherosclerosis with tortuous aorta, HTN, HLD, DM type II, diabetic polyneuropathy,COPD, chronic respiratory insuffiencey/chronic airflow limitation on home oxygen qHS , former smoker, CKD III (baseline SCr 1.1-1.2), chronic anemia, history of CVA with residual left-sided deficits, GERD, fibromyalgia, chronic pain, osteoarthritis, anxiety/depression, and obesity. She presents to ED with her daughter due to concerns for weakness, fatigue, shortness of breath, palpitations, chest discomfort, light headiness, dizziness, headaches, dysuria, nausea, vomiting, abdominal discomfort, chronic back pain, fever, chills, cough noting I just do not feel good. She denies diarrhea, constipation, hematuria, decreased UOP, blood in stool, seizures, recent falls, or syncope. She lives with her daughter, uses ambualtory aides as needed, and requires some assistance with ADLs. In the ED she is hypertensive otherwise HDS and afebrile.  CBC with WBC 13, H/H 10.7/33, platelets 319.  Chemistry was , K 4.7, chloride 101, CO2 22, BUN 31, SCr 1.4, glucose 205.  Magnesium 2.0.  LFTs unremarkable.  .  Troponin 0.009.  TSH 1.551.  Hepatitis-C and HIV nonreactive.  UA does not appear infectious.  CXR with cardiomegaly. CT A/P with normal appearance of the kidneys, ureters and bladder; mild pancreatic atrophy. The patient was placed in observation to the Hospital Medicine Service for further evaluation and treatment. "    Palliative medicine consulted for assistance with support/ACP.   "

## 2025-07-18 NOTE — ASSESSMENT & PLAN NOTE
-chronic  -continue home sertraline  -additional PRN supportive care as indicated      Operative Note      Patient: Armand Mcfarland  YOB: 1929  MRN: [id number]    Date of Procedure: 1/29/2021    Pre-Op Diagnosis: Left femoral neck fracture    Post-Op Diagnosis: Same       Procedure(s):  LEFT HIP HEMIARTHROPLASTY    Surgeon(s):  Elvis Morris MD    Assistant:   Alexa Tian PA-C    Anesthesia: Spinal    Estimated Blood Loss (mL): 846 mL    Complications: None    Specimens:   ID Type Source Tests Collected by Time Destination   A : LEFT HIP BONE Bone Bone SURGICAL PATHOLOGY Elvis Morris MD 1/29/2021 1245        Implants:  Implant Name Type Inv. Item Serial No.  Lot No. LRB No. Used Action   HEAD FEM [id number] +0MM OFFSET HIP CO CHROM V40 TAPR LO FRIC  HEAD FEM [id number] +0MM OFFSET HIP CO CHROM V40 TAPR LO FRIC  [employer] [id number] Left 1 Implanted   HEAD FEM OD47MM ID26MM HIP CO CHROM POLYETH BPLR CEMENTLESS  HEAD FEM OD47MM ID26MM HIP CO CHROM POLYETH BPLR CEMENTLESS  [employer] [id number] Left 1 Implanted   STEM FEM SZ 5 L130MM NK L37MM +48MM OFFSET 127DEG HIP CO  STEM FEM SZ 5 L130MM NK L37MM +48MM OFFSET 127DEG HIP CO  [employer] [id number] Left 1 Implanted         Drains:   Urethral Catheter (Active)   Urine Color Arabella 01/29/21 1125   Urine Appearance Hazy 01/29/21 1125   Output (mL) 250 mL 01/29/21 1125       Findings: Left femoral neck fracture    Detailed Description of Procedure:      History: Patient is a 45-year-old male, lives at home, previous history of Parkinson's disease and significant lower extremity edema issues as well as some compromised renal function. Is been ambulatory with a walker. He got up to go to the bathroom in the early morning hours of 1/27/2021, without his walker, and fell. Pain of the left hip with inability to ambulate. He came to [facility] emergency room where he was evaluated and found to have a left femoral neck fracture. Orthopedic surgery was consulted on 1/28/2021.   Plan was made for left hip hemiarthroplasty. All risks and benefits were discussed with the family and the patient. Patient was cleared medically. All agreed to proceed with surgery on 1/29/2021. Procedure: Patient came to the operating room and underwent spinal anesthesia. He then was positioned in the right lateral decubitus position for a left hip procedure. The area of the left hip and lower extremity was prepped and draped in the usual sterile manner. We made a standard curvilinear incision for a posterior approach to the left hip. Incision was carried through skin and subcutaneous fat tissue to the level of the fascia. We encountered hematoma in the subcu fat layer from the patient's fall. We crossed the tensor fascia in line with the incision to expose the greater trochanter. We took down the short external rotators from the posterior aspect of the greater trochanter to expose the posterior aspect of the hip joint capsule. We incised the capsule at the base of the neck and T'd the capsule back towards the 1 o'clock position. Tag sutures were placed in the capsular flaps. The femur was internally rotated to expose the fracture. We made a femoral neck cut using a size 5 broach from the [employer] HFx fracture system. We then remove the femoral head from the acetabulum and sized the head to a 47 mm diameter bipolar shell. We flexed the hip and internally rotated the femur to gain access to the proximal femur. We used a box chisel to initiate access to the femoral canal.  We then used the canal finder reamer to open up the canal.  We started with a size 1 broach and worked up to a size 5 broach with good proximal metaphyseal filling. We used the reamer to even off our femoral neck cut. We applied the trial neck and a 26 mm +0 diameter femoral head with a 47 mm bipolar shell. We reduced the hip.   This gave us good soft tissue tension and good stability with flexion to 90 degrees and internal rotation to about 75 to 80 degrees. Also were stable in extension external rotation. Therefore chose this combination of implants. Removed our trials. We copiously irrigated the femoral canal and the acetabulum. We inserted the actual Rodney HFx stem, size 5 with a 127 degree angle neck. This was fully seated. We applied the 26 mm +0 head with a 47 mm bipolar shell and reduced the hip. We had excellent stability as described above. We jordin irrigated the wound. We injected the posterior capsular flaps with local anesthetic for postop pain management and closed the posterior flaps with #1 Ethibond figure-of-eight suture. We then repaired the short external rotators back to the posterior margin of the greater trochanter with drill holes through bone. We copiously irrigated the wound. We closed the tensor fascia with running #1 strata fix suture. The subcu layer was injected with local anesthetic for postop pain management and we closed that layer with 2-0 Vicryl inverted suture. The skin was closed with a running 3-0 Monocryl subcuticular stitch. Steri-Strips were applied to the skin. Sterile dressing was applied with Aquacel dressing. Patient was placed in abductor pillow and turned supine on hospital bed. Patient was retrieved from anesthesia and taken to the recovery room in good condition, having tolerated the procedure well. All needle, sponge and instrument counts were correct. Implants: We used the [place] HFx femoral component, size 5 with a 127 degree angle neck, a 26 mm +0 mm head, and a 47 mm bipolar shell. Ramón Blackmon PA-C was present throughout the entirety of the procedure, including prepping and draping, patient positioning, incision and approach to the hip, all steps of the procedure including insertion of implants, wound closure, application of dressing and supervision of the patient leaving the operating room.     Electronically signed by Sepideh Lea MD on 1/29/2021 at 1:58 PM

## 2025-07-18 NOTE — ASSESSMENT & PLAN NOTE
"7/18/2025:  - chart reviewed in depth  - discussed extensively with primary team  - patient seen at bedside where she is joined by her daughter sEha  - introduced self and role. Sat down and talked with them.   - she shared more about herself and her life  - lives at home with her daughter Esha. It is clear how much love they have for one another  - patient well supported by daughter and leans on her daughter to answer questions at times.   - they have noted that she has been more unmotivated and not quite feeling like herself, which led to this admission  - seems her chronic pain has been playing a role on her more unmotivated, less active state  - she follows closely with pain management with next appointment coming up in 2 weeks   - we discussed her mood/pain extensively  - reflected on past recommendations by rheumatology and consideration for change in depression medication given dual effect of duloxetine as it may help vs mood + pain. Patient and daughter open to this adjustment in hopes of its improvement  - discussed consideration for initaiton of duloxetine 20mg daily, with plan for possible continued uptitration of duloxetine overtime and future de-escalation of sertraline. Can be further managed by pcp/pain management on discharge.   - discussed role of medications being "band-aids" and not actual fixes for underlying causes of her symptoms.   - discussed her medical on bay and associated concerns for progressive nature given COPD and HFrEF.   - we discussed the importance of routine and the correct mindset  - she shared her goal/wish is for living and getting better. She has the desire to get on track.   - discussed extensively her poor sleep hygiene and need for improved sleep and role it plays in all facets of how one feels  - encouraged not falling asleep to TV, limiting usage for 30 min to hour before sleeping. Wind down routine. Limiting water/food intake 2-3 hours before sleeping. Sleep " routine/consistent schedule.   - discussed role of exercise/aerobics such as swimming to help get her more active, facilitate weight loss and improve strength/resilience.   - she seemed open to considering these things especially since swimming has less pressure on her joints  - shared importance of continued close f/u with pain management for management of her chronic pain.   - allowed them a safe space to share and vent  - provided significant emotional support and listening ear   - shared role of home palliative for continued support on discharge in setting of COPD and HF and overall functional decline.   - updated primary team regarding conversation   [] continue with current full management. Her hope/goal is for getting on track medically and seems motivated to do so.   [] recommend close f/u with pain management on discharge. ? Consideration for rheum re-involvement for fibromyalgia, noted in pain management note.   [] noted HCPOA form on file outlining her daughter Esha as HCPOA  [] recommend home palliative medicine on discharge for continued support.

## 2025-07-18 NOTE — ASSESSMENT & PLAN NOTE
-[facility] 05/2022 detailed in HPI  -continue home ASA  -hold home fenofibrate and repatha for now >> resume at DC  -no statin due to allergies  -continue tele monitoring  -EKG PRN concerns

## 2025-07-18 NOTE — ASSESSMENT & PLAN NOTE
-[facility] 05/2022 detailed in HPI  -continue home ASA  -hold home fenofibrate and repatha for now >> resume at DC  -no statin due to allergies  -continue tele monitoring  -EKG PRN concerns

## 2025-07-19 LAB
ABSOLUTE EOSINOPHIL (OHS): 0.79 K/UL
ABSOLUTE MONOCYTE (OHS): 1.07 K/UL (ref 0.3–1)
ABSOLUTE NEUTROPHIL COUNT (OHS): 6.18 K/UL (ref 1.8–7.7)
ANION GAP (OHS): 13 MMOL/L (ref 8–16)
BASOPHILS # BLD AUTO: 0.07 K/UL
BASOPHILS NFR BLD AUTO: 0.6 %
BUN SERPL-MCNC: 29 MG/DL (ref 8–23)
CALCIUM SERPL-MCNC: 8.7 MG/DL (ref 8.7–10.5)
CHLORIDE SERPL-SCNC: 100 MMOL/L (ref 95–110)
CO2 SERPL-SCNC: 23 MMOL/L (ref 23–29)
CREAT SERPL-MCNC: 1.4 MG/DL (ref 0.5–1.4)
ERYTHROCYTE [DISTWIDTH] IN BLOOD BY AUTOMATED COUNT: 15.2 % (ref 11.5–14.5)
GFR SERPLBLD CREATININE-BSD FMLA CKD-EPI: 38 ML/MIN/1.73/M2
GLUCOSE SERPL-MCNC: 284 MG/DL (ref 70–110)
HCT VFR BLD AUTO: 31.5 % (ref 37–48.5)
HGB BLD-MCNC: 9.9 GM/DL (ref 12–16)
IMM GRANULOCYTES # BLD AUTO: 0.03 K/UL (ref 0–0.04)
IMM GRANULOCYTES NFR BLD AUTO: 0.3 % (ref 0–0.5)
LYMPHOCYTES # BLD AUTO: 2.88 K/UL (ref 1–4.8)
MAGNESIUM SERPL-MCNC: 1.9 MG/DL (ref 1.6–2.6)
MCH RBC QN AUTO: 26.7 PG (ref 27–31)
MCHC RBC AUTO-ENTMCNC: 31.4 G/DL (ref 32–36)
MCV RBC AUTO: 85 FL (ref 82–98)
NUCLEATED RBC (/100WBC) (OHS): 0 /100 WBC
PHOSPHATE SERPL-MCNC: 4.3 MG/DL (ref 2.7–4.5)
PLATELET # BLD AUTO: 306 K/UL (ref 150–450)
PMV BLD AUTO: 12 FL (ref 9.2–12.9)
POCT GLUCOSE: 146 MG/DL (ref 70–110)
POCT GLUCOSE: 232 MG/DL (ref 70–110)
POCT GLUCOSE: 321 MG/DL (ref 70–110)
POCT GLUCOSE: 382 MG/DL (ref 70–110)
POCT GLUCOSE: 393 MG/DL (ref 70–110)
POTASSIUM SERPL-SCNC: 4.4 MMOL/L (ref 3.5–5.1)
RBC # BLD AUTO: 3.71 M/UL (ref 4–5.4)
RELATIVE EOSINOPHIL (OHS): 7.2 %
RELATIVE LYMPHOCYTE (OHS): 26.1 % (ref 18–48)
RELATIVE MONOCYTE (OHS): 9.7 % (ref 4–15)
RELATIVE NEUTROPHIL (OHS): 56.1 % (ref 38–73)
SODIUM SERPL-SCNC: 136 MMOL/L (ref 136–145)
WBC # BLD AUTO: 11.02 K/UL (ref 3.9–12.7)

## 2025-07-19 PROCEDURE — 25000003 PHARM REV CODE 250: Performed by: INTERNAL MEDICINE

## 2025-07-19 PROCEDURE — 83735 ASSAY OF MAGNESIUM: CPT | Performed by: INTERNAL MEDICINE

## 2025-07-19 PROCEDURE — 25000242 PHARM REV CODE 250 ALT 637 W/ HCPCS: Performed by: INTERNAL MEDICINE

## 2025-07-19 PROCEDURE — 25000003 PHARM REV CODE 250: Performed by: NURSE PRACTITIONER

## 2025-07-19 PROCEDURE — 11000001 HC ACUTE MED/SURG PRIVATE ROOM

## 2025-07-19 PROCEDURE — 85025 COMPLETE CBC W/AUTO DIFF WBC: CPT | Performed by: INTERNAL MEDICINE

## 2025-07-19 PROCEDURE — 94799 UNLISTED PULMONARY SVC/PX: CPT

## 2025-07-19 PROCEDURE — 27000221 HC OXYGEN, UP TO 24 HOURS

## 2025-07-19 PROCEDURE — 94640 AIRWAY INHALATION TREATMENT: CPT

## 2025-07-19 PROCEDURE — 99900035 HC TECH TIME PER 15 MIN (STAT)

## 2025-07-19 PROCEDURE — 63600175 PHARM REV CODE 636 W HCPCS: Performed by: INTERNAL MEDICINE

## 2025-07-19 PROCEDURE — 63600175 PHARM REV CODE 636 W HCPCS: Performed by: NURSE PRACTITIONER

## 2025-07-19 PROCEDURE — 36415 COLL VENOUS BLD VENIPUNCTURE: CPT | Performed by: INTERNAL MEDICINE

## 2025-07-19 PROCEDURE — 80048 BASIC METABOLIC PNL TOTAL CA: CPT | Performed by: INTERNAL MEDICINE

## 2025-07-19 PROCEDURE — 94761 N-INVAS EAR/PLS OXIMETRY MLT: CPT

## 2025-07-19 PROCEDURE — 84100 ASSAY OF PHOSPHORUS: CPT | Performed by: INTERNAL MEDICINE

## 2025-07-19 RX ORDER — INSULIN GLARGINE 100 [IU]/ML
20 INJECTION, SOLUTION SUBCUTANEOUS 2 TIMES DAILY
Status: DISCONTINUED | OUTPATIENT
Start: 2025-07-19 | End: 2025-07-20

## 2025-07-19 RX ORDER — CAPSAICIN 0.03 G/100G
CREAM TOPICAL 3 TIMES DAILY
Status: DISCONTINUED | OUTPATIENT
Start: 2025-07-19 | End: 2025-07-22 | Stop reason: HOSPADM

## 2025-07-19 RX ORDER — HYDROCODONE BITARTRATE AND ACETAMINOPHEN 10; 325 MG/1; MG/1
1 TABLET ORAL EVERY 4 HOURS PRN
Refills: 0 | Status: DISCONTINUED | OUTPATIENT
Start: 2025-07-19 | End: 2025-07-22 | Stop reason: HOSPADM

## 2025-07-19 RX ORDER — INSULIN ASPART 100 [IU]/ML
18 INJECTION, SOLUTION INTRAVENOUS; SUBCUTANEOUS
Status: DISCONTINUED | OUTPATIENT
Start: 2025-07-20 | End: 2025-07-22 | Stop reason: HOSPADM

## 2025-07-19 RX ORDER — MECLIZINE HYDROCHLORIDE 25 MG/1
25 TABLET ORAL 3 TIMES DAILY PRN
Status: DISCONTINUED | OUTPATIENT
Start: 2025-07-19 | End: 2025-07-22 | Stop reason: HOSPADM

## 2025-07-19 RX ORDER — LOSARTAN POTASSIUM 25 MG/1
25 TABLET ORAL DAILY
Status: DISCONTINUED | OUTPATIENT
Start: 2025-07-20 | End: 2025-07-20

## 2025-07-19 RX ADMIN — HYDROCODONE BITARTRATE AND ACETAMINOPHEN 1 TABLET: 5; 325 TABLET ORAL at 02:07

## 2025-07-19 RX ADMIN — ACETAMINOPHEN 1000 MG: 500 TABLET, FILM COATED ORAL at 08:07

## 2025-07-19 RX ADMIN — ALLOPURINOL 300 MG: 300 TABLET ORAL at 08:07

## 2025-07-19 RX ADMIN — HYDROCODONE BITARTRATE AND ACETAMINOPHEN 1 TABLET: 5; 325 TABLET ORAL at 09:07

## 2025-07-19 RX ADMIN — METHOCARBAMOL 500 MG: 500 TABLET ORAL at 08:07

## 2025-07-19 RX ADMIN — PREGABALIN 150 MG: 75 CAPSULE ORAL at 08:07

## 2025-07-19 RX ADMIN — TORSEMIDE 20 MG: 20 TABLET ORAL at 09:07

## 2025-07-19 RX ADMIN — IPRATROPIUM BROMIDE AND ALBUTEROL 1 PUFF: 20; 100 SPRAY, METERED RESPIRATORY (INHALATION) at 07:07

## 2025-07-19 RX ADMIN — INSULIN GLARGINE 20 UNITS: 100 INJECTION, SOLUTION SUBCUTANEOUS at 08:07

## 2025-07-19 RX ADMIN — METOPROLOL SUCCINATE 100 MG: 50 TABLET, EXTENDED RELEASE ORAL at 08:07

## 2025-07-19 RX ADMIN — DOCUSATE SODIUM AND SENNOSIDES 1 TABLET: 8.6; 5 TABLET ORAL at 08:07

## 2025-07-19 RX ADMIN — NIFEDIPINE 30 MG: 30 TABLET, FILM COATED, EXTENDED RELEASE ORAL at 08:07

## 2025-07-19 RX ADMIN — INSULIN ASPART 15 UNITS: 100 INJECTION, SOLUTION INTRAVENOUS; SUBCUTANEOUS at 04:07

## 2025-07-19 RX ADMIN — SERTRALINE HYDROCHLORIDE 100 MG: 100 TABLET ORAL at 08:07

## 2025-07-19 RX ADMIN — IPRATROPIUM BROMIDE AND ALBUTEROL 1 PUFF: 20; 100 SPRAY, METERED RESPIRATORY (INHALATION) at 08:07

## 2025-07-19 RX ADMIN — INSULIN ASPART 2 UNITS: 100 INJECTION, SOLUTION INTRAVENOUS; SUBCUTANEOUS at 04:07

## 2025-07-19 RX ADMIN — HEPARIN SODIUM 5000 UNITS: 5000 INJECTION INTRAVENOUS; SUBCUTANEOUS at 02:07

## 2025-07-19 RX ADMIN — HYDROCODONE BITARTRATE AND ACETAMINOPHEN 1 TABLET: 5; 325 TABLET ORAL at 05:07

## 2025-07-19 RX ADMIN — ACETAMINOPHEN 1000 MG: 500 TABLET, FILM COATED ORAL at 02:07

## 2025-07-19 RX ADMIN — INSULIN ASPART 5 UNITS: 100 INJECTION, SOLUTION INTRAVENOUS; SUBCUTANEOUS at 08:07

## 2025-07-19 RX ADMIN — INSULIN ASPART 4 UNITS: 100 INJECTION, SOLUTION INTRAVENOUS; SUBCUTANEOUS at 12:07

## 2025-07-19 RX ADMIN — DULOXETINE HYDROCHLORIDE 20 MG: 20 CAPSULE, DELAYED RELEASE ORAL at 09:07

## 2025-07-19 RX ADMIN — ASPIRIN 81 MG: 81 TABLET, COATED ORAL at 08:07

## 2025-07-19 RX ADMIN — INSULIN ASPART 15 UNITS: 100 INJECTION, SOLUTION INTRAVENOUS; SUBCUTANEOUS at 08:07

## 2025-07-19 RX ADMIN — IPRATROPIUM BROMIDE AND ALBUTEROL 1 PUFF: 20; 100 SPRAY, METERED RESPIRATORY (INHALATION) at 12:07

## 2025-07-19 RX ADMIN — HYDROXYZINE HYDROCHLORIDE 25 MG: 25 TABLET, FILM COATED ORAL at 08:07

## 2025-07-19 RX ADMIN — INSULIN GLARGINE 18 UNITS: 100 INJECTION, SOLUTION SUBCUTANEOUS at 08:07

## 2025-07-19 RX ADMIN — INSULIN ASPART 15 UNITS: 100 INJECTION, SOLUTION INTRAVENOUS; SUBCUTANEOUS at 12:07

## 2025-07-19 RX ADMIN — METHOCARBAMOL 500 MG: 500 TABLET ORAL at 02:07

## 2025-07-19 RX ADMIN — IPRATROPIUM BROMIDE AND ALBUTEROL 1 PUFF: 20; 100 SPRAY, METERED RESPIRATORY (INHALATION) at 01:07

## 2025-07-19 RX ADMIN — HEPARIN SODIUM 5000 UNITS: 5000 INJECTION INTRAVENOUS; SUBCUTANEOUS at 08:07

## 2025-07-19 RX ADMIN — HYDROCODONE BITARTRATE AND ACETAMINOPHEN 1 TABLET: 10; 325 TABLET ORAL at 08:07

## 2025-07-19 RX ADMIN — CAPSAICIN: 0.25 CREAM TOPICAL at 09:07

## 2025-07-19 RX ADMIN — MELATONIN TAB 3 MG 6 MG: 3 TAB at 08:07

## 2025-07-19 NOTE — PLAN OF CARE
Resting comfortably in bed at this time.  VSS on RA and afebrile this shift.      Tolerating pain on PO.      Good appetite and good UOP this shift, achs- 393 / 321 / 232 - trending down this shift     Repositions self independently in bed and with assist  PT OT following     Free from injury or skin breakdown; Fall precautions maintained and call light in reach.  POC updated questions answered and comments acknowledged.  Purposeful hourly rounding completed this shift.

## 2025-07-19 NOTE — ASSESSMENT & PLAN NOTE
- Generalized fatigue, weakness, and multiple complaints involving multiple systems.  - PT/OT evaluations. Labs overall unrevealing for acute abnormality. Clinically appears improved and in better spirits though reports significant symptoms remain. Monitor with repeat labs.  - Anticipate likely DC with home health / additional support at home to assist.  - Multitude of symptoms remain prominent. Will ask palliative to evaluate in addition for management recommendations.

## 2025-07-19 NOTE — ASSESSMENT & PLAN NOTE
-[facility] 05/2022 detailed in HPI  -continue home ASA  -hold home fenofibrate and repatha for now >> resume at DC  -no statin due to allergies  -continue tele monitoring  -EKG PRN concerns

## 2025-07-19 NOTE — ASSESSMENT & PLAN NOTE
-[facility] 05/2022 detailed in HPI  -continue home ASA  -hold home fenofibrate and repatha for now >> resume at DC  -no statin due to allergies  -continue tele monitoring  -EKG PRN concerns

## 2025-07-19 NOTE — ASSESSMENT & PLAN NOTE
- Chronic; appears stable.  -continue home metoprolol and nifedipine with hold parameters   - Given symptoms persist and poor intake, continue to hold losartan, torsemide for now.

## 2025-07-19 NOTE — ASSESSMENT & PLAN NOTE
-at admission had complaints of dysuria with nausea and vomiting; UA without WBCs or RBCs.  - Outpatient urine culture collected but does not appear she had UA at that time.  - Without significant fevers, further reported dysuria suspicion for significant UTI is lower. No further ciprofloxacin for time being.  - Reports abdominal symptoms persist. Add lipase; repeat CT Abd/Pelvis with contrast to re-evaluate.

## 2025-07-19 NOTE — PROGRESS NOTES
[facility] ([address] Medicine  Progress Note    Patient Name: Indira Waters  MRN: [id number]  Patient Class: IP- Inpatient   Admission Date: 7/16/2025  Length of Stay: 1 days  Attending Physician: HEAVENLY Perez MD  Primary Care Provider: Chun Zapata MD        Subjective     Principal Problem:General weakness        HPI:  Ms. Waters is a 80 YOF with PMHx of HFrEF (EF 40-50% 06/2024), CAD ([facility] 05/2022 with severe 2 vessel, predominantly distal, disease), aortic atherosclerosis with tortuous aorta, HTN, HLD, DM type II, diabetic polyneuropathy,COPD, chronic respiratory insuffiencey/chronic airflow limitation on home oxygen qHS , former smoker, CKD III (baseline SCr 1.1-1.2), chronic anemia, history of CVA with residual left-sided deficits, GERD, fibromyalgia, chronic pain, osteoarthritis, anxiety/depression, and obesity.     She presents to ED with her daughter due to concerns for weakness, fatigue, shortness of breath, palpitations, chest discomfort, light headiness, dizziness, headaches, dysuria, nausea, vomiting, abdominal discomfort, chronic back pain, fever, chills, cough noting I just do not feel good. She denies diarrhea, constipation, hematuria, decreased UOP, blood in stool, seizures, recent falls, or syncope. She lives with her daughter, uses ambualtory aides as needed, and requires some assistance with ADLs.     In the ED she is hypertensive otherwise HDS and afebrile.  CBC with WBC 13, H/H 10.7/33, platelets 319.  Chemistry was , K 4.7, chloride 101, CO2 22, BUN 31, SCr 1.4, glucose 205.  Magnesium 2.0.  LFTs unremarkable.  .  Troponin 0.009.  TSH 1.551.  Hepatitis-C and HIV nonreactive.  UA does not appear infectious.  CXR with cardiomegaly. CT A/P with normal appearance of the kidneys, ureters and bladder; mild pancreatic atrophy.     The patient was placed in observation to the Hospital Medicine Service for further evaluation and treatment.      Overview/Hospital Course:  No notes on file    Interval History: No acute events overnight, though reports symptoms remain significant with fatigue, abdominal pain, nausea. Discussed plan of care with patient/daughter. No other concerns at this time.    Review of Systems   Constitutional:  Positive for fatigue. Negative for chills and fever.   Respiratory:  Positive for shortness of breath. Negative for cough.    Cardiovascular:  Negative for chest pain and palpitations.   Gastrointestinal:  Positive for abdominal pain and nausea. Negative for vomiting.   Musculoskeletal:  Positive for arthralgias and myalgias.     Objective:     Vital Signs (Most Recent):  Temp: 97.9 °F (36.6 °C) (07/18/25 1929)  Pulse: 74 (07/18/25 1929)  Resp: 15 (07/18/25 1929)  BP: 134/61 (07/18/25 1929)  SpO2: 98 % (07/18/25 1929) Vital Signs (24h Range):  Temp:  [97.7 °F (36.5 °C)-98.2 °F (36.8 °C)] 97.9 °F (36.6 °C)  Pulse:  [70-80] 74  Resp:  [14-20] 15  SpO2:  [96 %-99 %] 98 %  BP: (110-134)/(56-61) 134/61     Weight: 94 kg (207 lb 3.7 oz)  Body mass index is 40.47 kg/m².    Intake/Output Summary (Last 24 hours) at 7/18/2025 1954  Last data filed at 7/18/2025 1641  Gross per 24 hour   Intake --   Output 2400 ml   Net -2400 ml         Physical Exam  Vitals and nursing note reviewed.   Constitutional:       General: She is not in acute distress.     Appearance: She is well-developed. She is ill-appearing (chronically).   HENT:      Head: Normocephalic and atraumatic.   Eyes:      General:         Right eye: No discharge.         Left eye: No discharge.      Conjunctiva/sclera: Conjunctivae normal.   Cardiovascular:      Rate and Rhythm: Normal rate.      Pulses: Normal pulses.   Pulmonary:      Effort: Pulmonary effort is normal. No respiratory distress.      Breath sounds: Wheezing (trace bilateral) present.   Abdominal:      Palpations: Abdomen is soft.      Tenderness: There is no abdominal tenderness.   Musculoskeletal:          General: Normal range of motion.      Right lower leg: Edema present.      Left lower leg: Edema present.   Skin:     General: Skin is warm and dry.   Neurological:      Mental Status: She is alert and oriented to person, place, and time.           Significant Labs:   CBC:  Recent Labs   Lab 07/16/25  1408 07/17/25  0352 07/18/25  0558   WBC 12.64 11.07 9.95   HGB 10.7* 10.1* 10.5*   HCT 32.9* 32.5* 32.2*    295 313   NEUTROAUTO 66.0 55.3 51.8   LYMPH 2.73  21.6 3.09  27.9 2.98  29.9   MONO 7.8  0.98 10.3  1.14* 10.4  1.03*   EOS 3.8  0.48 5.5  0.61* 7.0  0.70*   BMP:  Recent Labs   Lab 07/16/25  1408 07/17/25  0352 07/18/25  0558    140 140   K 4.7 4.3 4.3    103 103   CO2 22* 24 25   BUN 31* 26* 24*   CREATININE 1.4 1.3 1.4   * 211* 113*   CALCIUM 9.7 9.1 9.3   MG 2.0 2.1  --      Significant Imaging: I have reviewed and interpreted all pertinent imaging results/findings within the past 24 hours.      Assessment & Plan  General weakness  Chronic pain syndrome  Fibromyalgia  - Generalized fatigue, weakness, and multiple complaints involving multiple systems.  - PT/OT evaluations. Labs overall unrevealing for acute abnormality. Clinically appears improved and in better spirits though reports significant symptoms remain. Monitor with repeat labs.  - Anticipate likely DC with home health / additional support at home to assist.  - Multitude of symptoms remain prominent. Will ask palliative to evaluate in addition for management recommendations.  Dysuria  Nausea and vomiting  -at admission had complaints of dysuria with nausea and vomiting; UA without WBCs or RBCs.  - Outpatient urine culture collected but does not appear she had UA at that time.  - Without significant fevers, further reported dysuria suspicion for significant UTI is lower. No further ciprofloxacin for time being.  - Reports abdominal symptoms persist. Add lipase; repeat CT Abd/Pelvis with contrast to  re-evaluate.  Chronic respiratory failure with hypoxia  CAFL (chronic airflow limitation)  Chronic bronchitis  On home oxygen therapy  COPD (chronic obstructive pulmonary disease)  Former smoker  - History of COPD with chronic resp failure on home O2.  - Continue PRN nebulizations, controller, incentive spirometry, supportive care.  Chronic combined systolic and diastolic congestive heart failure  Essential hypertension  - Chronic; appears stable.  -continue home metoprolol and nifedipine with hold parameters   - Given symptoms persist and poor intake, continue to hold losartan, torsemide for now.  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 pectoris  Athscl heart disease of native coronary artery w/o ang [facility] 05/2022 detailed in HPI  -continue home ASA  -hold home fenofibrate and repatha for now >> resume at DC  -no statin due to allergies  -continue tele monitoring  -EKG PRN concerns     History of CVA (cerebrovascular accident)  Hemiplegia and hemiparesis following cerebral infarction affecting left non-dominant side  -history of   -continue home ASA  -hold home fenofibrate and repatha for now >> resume at DC  -no statin due to allergies  -fall precautions     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 disease  - Chronic; variable control.  - HgbA1c 8.0.  - Continue insulin glargine at 18U subQ BID, insulin aspart 15U subQ TIDWM, low-dose sliding scale insulin aspart 0-5U subQ TIDWM PRN.  Gastroesophageal reflux disease without esophagitis  -chronic  -PRN supportive care as indicated  Stage 3 chronic kidney disease  -chronic; recent elevation, now improving.  - Monitor. Avoid nephrotoxins, renally dose medications.  Depression, unspecified  Generalized anxiety disorder  Recurrent major depressive disorder, in remission  -chronic  -continue home sertraline  -additional PRN supportive care as indicated       VTE Risk Mitigation (From admission, onward)            Ordered     heparin (porcine) injection 5,000 Units  Every 8 hours         07/16/25 1824     IP VTE HIGH RISK PATIENT  Once         07/16/25 1824     Place sequential compression device  Until discontinued         07/16/25 1824                    Discharge Planning   KYLE: 7/19/2025     Code Status: Full Code   Medical Readiness for Discharge Date:   Discharge Plan A: Home with family          D William Perez MD  Department of Hospital Medicine   [facility] [place] ([address])

## 2025-07-19 NOTE — SUBJECTIVE & OBJECTIVE
Interval History: No acute events overnight, though reports symptoms remain significant with fatigue, abdominal pain, nausea. Discussed plan of care with patient/daughter. No other concerns at this time.    Review of Systems   Constitutional:  Positive for fatigue. Negative for chills and fever.   Respiratory:  Positive for shortness of breath. Negative for cough.    Cardiovascular:  Negative for chest pain and palpitations.   Gastrointestinal:  Positive for abdominal pain and nausea. Negative for vomiting.   Musculoskeletal:  Positive for arthralgias and myalgias.     Objective:     Vital Signs (Most Recent):  Temp: 97.9 °F (36.6 °C) (07/18/25 1929)  Pulse: 74 (07/18/25 1929)  Resp: 15 (07/18/25 1929)  BP: 134/61 (07/18/25 1929)  SpO2: 98 % (07/18/25 1929) Vital Signs (24h Range):  Temp:  [97.7 °F (36.5 °C)-98.2 °F (36.8 °C)] 97.9 °F (36.6 °C)  Pulse:  [70-80] 74  Resp:  [14-20] 15  SpO2:  [96 %-99 %] 98 %  BP: (110-134)/(56-61) 134/61     Weight: 94 kg (207 lb 3.7 oz)  Body mass index is 40.47 kg/m².    Intake/Output Summary (Last 24 hours) at 7/18/2025 1954  Last data filed at 7/18/2025 1641  Gross per 24 hour   Intake --   Output 2400 ml   Net -2400 ml         Physical Exam  Vitals and nursing note reviewed.   Constitutional:       General: She is not in acute distress.     Appearance: She is well-developed. She is ill-appearing (chronically).   HENT:      Head: Normocephalic and atraumatic.   Eyes:      General:         Right eye: No discharge.         Left eye: No discharge.      Conjunctiva/sclera: Conjunctivae normal.   Cardiovascular:      Rate and Rhythm: Normal rate.      Pulses: Normal pulses.   Pulmonary:      Effort: Pulmonary effort is normal. No respiratory distress.      Breath sounds: Wheezing (trace bilateral) present.   Abdominal:      Palpations: Abdomen is soft.      Tenderness: There is no abdominal tenderness.   Musculoskeletal:         General: Normal range of motion.      Right lower leg:  Edema present.      Left lower leg: Edema present.   Skin:     General: Skin is warm and dry.   Neurological:      Mental Status: She is alert and oriented to person, place, and time.           Significant Labs:   CBC:  Recent Labs   Lab 07/16/25  1408 07/17/25  0352 07/18/25  0558   WBC 12.64 11.07 9.95   HGB 10.7* 10.1* 10.5*   HCT 32.9* 32.5* 32.2*    295 313   NEUTROAUTO 66.0 55.3 51.8   LYMPH 2.73  21.6 3.09  27.9 2.98  29.9   MONO 7.8  0.98 10.3  1.14* 10.4  1.03*   EOS 3.8  0.48 5.5  0.61* 7.0  0.70*   BMP:  Recent Labs   Lab 07/16/25  1408 07/17/25  0352 07/18/25  0558    140 140   K 4.7 4.3 4.3    103 103   CO2 22* 24 25   BUN 31* 26* 24*   CREATININE 1.4 1.3 1.4   * 211* 113*   CALCIUM 9.7 9.1 9.3   MG 2.0 2.1  --      Significant Imaging: I have reviewed and interpreted all pertinent imaging results/findings within the past 24 hours.

## 2025-07-19 NOTE — ASSESSMENT & PLAN NOTE
-[facility] 05/2022 detailed in HPI  -continue home ASA  -hold home fenofibrate and repatha for now >> resume at DC  -no statin due to allergies  -continue tele monitoring  -EKG PRN concerns

## 2025-07-20 LAB
ABSOLUTE EOSINOPHIL (OHS): 0.87 K/UL
ABSOLUTE MONOCYTE (OHS): 0.98 K/UL (ref 0.3–1)
ABSOLUTE NEUTROPHIL COUNT (OHS): 6.34 K/UL (ref 1.8–7.7)
ANION GAP (OHS): 11 MMOL/L (ref 8–16)
BASOPHILS # BLD AUTO: 0.07 K/UL
BASOPHILS NFR BLD AUTO: 0.6 %
BUN SERPL-MCNC: 31 MG/DL (ref 8–23)
CALCIUM SERPL-MCNC: 9 MG/DL (ref 8.7–10.5)
CHLORIDE SERPL-SCNC: 99 MMOL/L (ref 95–110)
CO2 SERPL-SCNC: 24 MMOL/L (ref 23–29)
CREAT SERPL-MCNC: 1.5 MG/DL (ref 0.5–1.4)
ERYTHROCYTE [DISTWIDTH] IN BLOOD BY AUTOMATED COUNT: 15.2 % (ref 11.5–14.5)
GFR SERPLBLD CREATININE-BSD FMLA CKD-EPI: 35 ML/MIN/1.73/M2
GLUCOSE SERPL-MCNC: 297 MG/DL (ref 70–110)
HCT VFR BLD AUTO: 31.6 % (ref 37–48.5)
HGB BLD-MCNC: 10.1 GM/DL (ref 12–16)
IMM GRANULOCYTES # BLD AUTO: 0.05 K/UL (ref 0–0.04)
IMM GRANULOCYTES NFR BLD AUTO: 0.4 % (ref 0–0.5)
LYMPHOCYTES # BLD AUTO: 3.1 K/UL (ref 1–4.8)
MAGNESIUM SERPL-MCNC: 1.9 MG/DL (ref 1.6–2.6)
MCH RBC QN AUTO: 27.2 PG (ref 27–31)
MCHC RBC AUTO-ENTMCNC: 32 G/DL (ref 32–36)
MCV RBC AUTO: 85 FL (ref 82–98)
NUCLEATED RBC (/100WBC) (OHS): 0 /100 WBC
PHOSPHATE SERPL-MCNC: 4 MG/DL (ref 2.7–4.5)
PLATELET # BLD AUTO: 292 K/UL (ref 150–450)
PMV BLD AUTO: 12 FL (ref 9.2–12.9)
POCT GLUCOSE: 352 MG/DL (ref 70–110)
POTASSIUM SERPL-SCNC: 4.4 MMOL/L (ref 3.5–5.1)
RBC # BLD AUTO: 3.72 M/UL (ref 4–5.4)
RELATIVE EOSINOPHIL (OHS): 7.6 %
RELATIVE LYMPHOCYTE (OHS): 27.2 % (ref 18–48)
RELATIVE MONOCYTE (OHS): 8.6 % (ref 4–15)
RELATIVE NEUTROPHIL (OHS): 55.6 % (ref 38–73)
SODIUM SERPL-SCNC: 134 MMOL/L (ref 136–145)
WBC # BLD AUTO: 11.41 K/UL (ref 3.9–12.7)

## 2025-07-20 PROCEDURE — 99900035 HC TECH TIME PER 15 MIN (STAT)

## 2025-07-20 PROCEDURE — 84100 ASSAY OF PHOSPHORUS: CPT | Performed by: INTERNAL MEDICINE

## 2025-07-20 PROCEDURE — 97116 GAIT TRAINING THERAPY: CPT | Mod: CQ

## 2025-07-20 PROCEDURE — 80048 BASIC METABOLIC PNL TOTAL CA: CPT | Performed by: INTERNAL MEDICINE

## 2025-07-20 PROCEDURE — 94761 N-INVAS EAR/PLS OXIMETRY MLT: CPT

## 2025-07-20 PROCEDURE — 85025 COMPLETE CBC W/AUTO DIFF WBC: CPT | Performed by: INTERNAL MEDICINE

## 2025-07-20 PROCEDURE — 94799 UNLISTED PULMONARY SVC/PX: CPT

## 2025-07-20 PROCEDURE — 25000003 PHARM REV CODE 250: Performed by: INTERNAL MEDICINE

## 2025-07-20 PROCEDURE — 27000221 HC OXYGEN, UP TO 24 HOURS

## 2025-07-20 PROCEDURE — 25000242 PHARM REV CODE 250 ALT 637 W/ HCPCS: Performed by: INTERNAL MEDICINE

## 2025-07-20 PROCEDURE — 11000001 HC ACUTE MED/SURG PRIVATE ROOM

## 2025-07-20 PROCEDURE — 36415 COLL VENOUS BLD VENIPUNCTURE: CPT | Performed by: INTERNAL MEDICINE

## 2025-07-20 PROCEDURE — 97530 THERAPEUTIC ACTIVITIES: CPT | Mod: CQ

## 2025-07-20 PROCEDURE — 83735 ASSAY OF MAGNESIUM: CPT | Performed by: INTERNAL MEDICINE

## 2025-07-20 PROCEDURE — 63600175 PHARM REV CODE 636 W HCPCS: Performed by: INTERNAL MEDICINE

## 2025-07-20 PROCEDURE — 63600175 PHARM REV CODE 636 W HCPCS: Performed by: NURSE PRACTITIONER

## 2025-07-20 PROCEDURE — 25000003 PHARM REV CODE 250: Performed by: NURSE PRACTITIONER

## 2025-07-20 PROCEDURE — 94640 AIRWAY INHALATION TREATMENT: CPT

## 2025-07-20 RX ORDER — INSULIN GLARGINE 100 [IU]/ML
24 INJECTION, SOLUTION SUBCUTANEOUS 2 TIMES DAILY
Status: DISCONTINUED | OUTPATIENT
Start: 2025-07-20 | End: 2025-07-22 | Stop reason: HOSPADM

## 2025-07-20 RX ORDER — INSULIN ASPART 100 [IU]/ML
0-10 INJECTION, SOLUTION INTRAVENOUS; SUBCUTANEOUS
Status: DISCONTINUED | OUTPATIENT
Start: 2025-07-20 | End: 2025-07-22 | Stop reason: HOSPADM

## 2025-07-20 RX ORDER — CAPSAICIN 0.03 G/100G
CREAM TOPICAL 3 TIMES DAILY
Qty: 50 G | Refills: 0 | Status: SHIPPED | OUTPATIENT
Start: 2025-07-20

## 2025-07-20 RX ORDER — POLYETHYLENE GLYCOL 3350 17 G/17G
17 POWDER, FOR SOLUTION ORAL 2 TIMES DAILY
Status: DISCONTINUED | OUTPATIENT
Start: 2025-07-20 | End: 2025-07-22 | Stop reason: HOSPADM

## 2025-07-20 RX ORDER — DULOXETIN HYDROCHLORIDE 20 MG/1
20 CAPSULE, DELAYED RELEASE ORAL DAILY
Qty: 30 CAPSULE | Refills: 0 | Status: SHIPPED | OUTPATIENT
Start: 2025-07-21

## 2025-07-20 RX ORDER — MECLIZINE HYDROCHLORIDE 25 MG/1
25 TABLET ORAL 3 TIMES DAILY PRN
Qty: 30 TABLET | Refills: 0 | Status: SHIPPED | OUTPATIENT
Start: 2025-07-20

## 2025-07-20 RX ADMIN — ACETAMINOPHEN 1000 MG: 500 TABLET, FILM COATED ORAL at 08:07

## 2025-07-20 RX ADMIN — METOPROLOL SUCCINATE 100 MG: 50 TABLET, EXTENDED RELEASE ORAL at 08:07

## 2025-07-20 RX ADMIN — ALLOPURINOL 300 MG: 300 TABLET ORAL at 08:07

## 2025-07-20 RX ADMIN — HYDROCODONE BITARTRATE AND ACETAMINOPHEN 1 TABLET: 10; 325 TABLET ORAL at 08:07

## 2025-07-20 RX ADMIN — NIFEDIPINE 30 MG: 30 TABLET, FILM COATED, EXTENDED RELEASE ORAL at 08:07

## 2025-07-20 RX ADMIN — HYDROCODONE BITARTRATE AND ACETAMINOPHEN 1 TABLET: 10; 325 TABLET ORAL at 05:07

## 2025-07-20 RX ADMIN — INSULIN ASPART 5 UNITS: 100 INJECTION, SOLUTION INTRAVENOUS; SUBCUTANEOUS at 09:07

## 2025-07-20 RX ADMIN — METHOCARBAMOL 500 MG: 500 TABLET ORAL at 08:07

## 2025-07-20 RX ADMIN — ACETAMINOPHEN 1000 MG: 500 TABLET, FILM COATED ORAL at 05:07

## 2025-07-20 RX ADMIN — INSULIN ASPART 18 UNITS: 100 INJECTION, SOLUTION INTRAVENOUS; SUBCUTANEOUS at 04:07

## 2025-07-20 RX ADMIN — INSULIN ASPART 18 UNITS: 100 INJECTION, SOLUTION INTRAVENOUS; SUBCUTANEOUS at 12:07

## 2025-07-20 RX ADMIN — DULOXETINE HYDROCHLORIDE 20 MG: 20 CAPSULE, DELAYED RELEASE ORAL at 08:07

## 2025-07-20 RX ADMIN — HEPARIN SODIUM 5000 UNITS: 5000 INJECTION INTRAVENOUS; SUBCUTANEOUS at 05:07

## 2025-07-20 RX ADMIN — HEPARIN SODIUM 5000 UNITS: 5000 INJECTION INTRAVENOUS; SUBCUTANEOUS at 08:07

## 2025-07-20 RX ADMIN — CAPSAICIN: 0.25 CREAM TOPICAL at 08:07

## 2025-07-20 RX ADMIN — METHOCARBAMOL 500 MG: 500 TABLET ORAL at 04:07

## 2025-07-20 RX ADMIN — INSULIN ASPART 4 UNITS: 100 INJECTION, SOLUTION INTRAVENOUS; SUBCUTANEOUS at 12:07

## 2025-07-20 RX ADMIN — PREGABALIN 150 MG: 75 CAPSULE ORAL at 08:07

## 2025-07-20 RX ADMIN — INSULIN ASPART 4 UNITS: 100 INJECTION, SOLUTION INTRAVENOUS; SUBCUTANEOUS at 04:07

## 2025-07-20 RX ADMIN — POLYETHYLENE GLYCOL 3350 17 G: 17 POWDER, FOR SOLUTION ORAL at 08:07

## 2025-07-20 RX ADMIN — CAPSAICIN: 0.25 CREAM TOPICAL at 03:07

## 2025-07-20 RX ADMIN — IPRATROPIUM BROMIDE AND ALBUTEROL 1 PUFF: 20; 100 SPRAY, METERED RESPIRATORY (INHALATION) at 01:07

## 2025-07-20 RX ADMIN — ASPIRIN 81 MG: 81 TABLET, COATED ORAL at 08:07

## 2025-07-20 RX ADMIN — HYDROXYZINE HYDROCHLORIDE 25 MG: 25 TABLET, FILM COATED ORAL at 08:07

## 2025-07-20 RX ADMIN — DOCUSATE SODIUM AND SENNOSIDES 1 TABLET: 8.6; 5 TABLET ORAL at 08:07

## 2025-07-20 RX ADMIN — INSULIN ASPART 18 UNITS: 100 INJECTION, SOLUTION INTRAVENOUS; SUBCUTANEOUS at 08:07

## 2025-07-20 RX ADMIN — IPRATROPIUM BROMIDE AND ALBUTEROL 1 PUFF: 20; 100 SPRAY, METERED RESPIRATORY (INHALATION) at 08:07

## 2025-07-20 RX ADMIN — IPRATROPIUM BROMIDE AND ALBUTEROL 1 PUFF: 20; 100 SPRAY, METERED RESPIRATORY (INHALATION) at 07:07

## 2025-07-20 RX ADMIN — MELATONIN TAB 3 MG 6 MG: 3 TAB at 08:07

## 2025-07-20 RX ADMIN — INSULIN GLARGINE 24 UNITS: 100 INJECTION, SOLUTION SUBCUTANEOUS at 08:07

## 2025-07-20 RX ADMIN — HYDROCODONE BITARTRATE AND ACETAMINOPHEN 1 TABLET: 10; 325 TABLET ORAL at 01:07

## 2025-07-20 RX ADMIN — HEPARIN SODIUM 5000 UNITS: 5000 INJECTION INTRAVENOUS; SUBCUTANEOUS at 01:07

## 2025-07-20 RX ADMIN — SERTRALINE HYDROCHLORIDE 100 MG: 100 TABLET ORAL at 08:07

## 2025-07-20 RX ADMIN — INSULIN GLARGINE 20 UNITS: 100 INJECTION, SOLUTION SUBCUTANEOUS at 09:07

## 2025-07-20 RX ADMIN — HYDROCODONE BITARTRATE AND ACETAMINOPHEN 1 TABLET: 10; 325 TABLET ORAL at 09:07

## 2025-07-20 NOTE — ASSESSMENT & PLAN NOTE
- Generalized fatigue, weakness, and multiple complaints involving multiple systems.  - PT/OT evaluations. Labs overall unrevealing for acute abnormality. Clinically appears improved and in better spirits though reports significant symptoms remain. Monitor with repeat labs.  - Anticipate likely DC with home health / additional support at home to assist. Discussed with patient/family; power out at home so currently unable to use home O2 which will be a barrier to discharge. CM investigating/assisting.  - Multitude of symptoms remain prominent. Palliative consulted; appreciate assistance. Continue acetaminophen 1000mg PO q8hr, methocarbamol, hydrocodone-acetaminophen at 10-325mg PO q4hr PRN.

## 2025-07-20 NOTE — ASSESSMENT & PLAN NOTE
- Chronic; appears stable.  - Continue metoprolol, nifedipine, torsemide, resume losartan at 25mg.

## 2025-07-20 NOTE — PROGRESS NOTES
[facility] ([address] Medicine  Progress Note    Patient Name: Indira Waters  MRN: [id number]  Patient Class: IP- Inpatient   Admission Date: 7/16/2025  Length of Stay: 2 days  Attending Physician: HEAVENLY Perez MD  Primary Care Provider: Chun Zapata MD    Subjective     Principal Problem:General weakness        HPI:  Ms. Waters is a 80 YOF with PMHx of HFrEF (EF 40-50% 06/2024), CAD ([facility] 05/2022 with severe 2 vessel, predominantly distal, disease), aortic atherosclerosis with tortuous aorta, HTN, HLD, DM type II, diabetic polyneuropathy,COPD, chronic respiratory insuffiencey/chronic airflow limitation on home oxygen qHS , former smoker, CKD III (baseline SCr 1.1-1.2), chronic anemia, history of CVA with residual left-sided deficits, GERD, fibromyalgia, chronic pain, osteoarthritis, anxiety/depression, and obesity.     She presents to ED with her daughter due to concerns for weakness, fatigue, shortness of breath, palpitations, chest discomfort, light headiness, dizziness, headaches, dysuria, nausea, vomiting, abdominal discomfort, chronic back pain, fever, chills, cough noting I just do not feel good. She denies diarrhea, constipation, hematuria, decreased UOP, blood in stool, seizures, recent falls, or syncope. She lives with her daughter, uses ambualtory aides as needed, and requires some assistance with ADLs.     In the ED she is hypertensive otherwise HDS and afebrile.  CBC with WBC 13, H/H 10.7/33, platelets 319.  Chemistry was , K 4.7, chloride 101, CO2 22, BUN 31, SCr 1.4, glucose 205.  Magnesium 2.0.  LFTs unremarkable.  .  Troponin 0.009.  TSH 1.551.  Hepatitis-C and HIV nonreactive.  UA does not appear infectious.  CXR with cardiomegaly. CT A/P with normal appearance of the kidneys, ureters and bladder; mild pancreatic atrophy.     The patient was placed in observation to the Hospital Medicine Service for further evaluation and treatment.      Overview/Hospital Course:  No notes on file    Interval History: No acute events overnight. Not as short of breath today; still some nausea and abdominal pain. Reports a little dizziness today with positional changes as well. Discussed with patient/family at bedside; return home may be a challenge as power is out and she will not have access to her home O2 concentrator at this time.    Review of Systems   Constitutional:  Positive for fatigue. Negative for chills and fever.   Respiratory:  Negative for cough and shortness of breath.    Cardiovascular:  Negative for chest pain and palpitations.   Gastrointestinal:  Positive for abdominal pain and nausea. Negative for vomiting.   Musculoskeletal:  Positive for arthralgias and myalgias.     Objective:     Vital Signs (Most Recent):  Temp: 97.4 °F (36.3 °C) (07/19/25 1925)  Pulse: 75 (07/19/25 1925)  Resp: 15 (07/19/25 1925)  BP: (!) 146/63 (07/19/25 1925)  SpO2: 97 % (07/19/25 1925) Vital Signs (24h Range):  Temp:  [97.1 °F (36.2 °C)-98.6 °F (37 °C)] 97.4 °F (36.3 °C)  Pulse:  [71-78] 75  Resp:  [14-20] 15  SpO2:  [94 %-99 %] 97 %  BP: (124-146)/(56-74) 146/63     Weight: 94 kg (207 lb 3.7 oz)  Body mass index is 40.47 kg/m².    Intake/Output Summary (Last 24 hours) at 7/19/2025 1928  Last data filed at 7/19/2025 1824  Gross per 24 hour   Intake 720 ml   Output 2500 ml   Net -1780 ml         Physical Exam  Vitals and nursing note reviewed.   Constitutional:       General: She is not in acute distress.     Appearance: She is well-developed. She is ill-appearing (chronically).   HENT:      Head: Normocephalic and atraumatic.   Eyes:      General:         Right eye: No discharge.         Left eye: No discharge.      Conjunctiva/sclera: Conjunctivae normal.   Cardiovascular:      Rate and Rhythm: Normal rate.      Pulses: Normal pulses.   Pulmonary:      Effort: Pulmonary effort is normal. No respiratory distress.      Breath sounds: Wheezing (trace bilateral) present.    Abdominal:      Palpations: Abdomen is soft.      Tenderness: There is no abdominal tenderness.   Musculoskeletal:         General: Normal range of motion.      Right lower leg: Edema present.      Left lower leg: Edema present.   Skin:     General: Skin is warm and dry.   Neurological:      Mental Status: She is alert and oriented to person, place, and time.           Significant Labs:   CBC:  Recent Labs   Lab 07/17/25  0352 07/18/25  0558 07/19/25  0316   WBC 11.07 9.95 11.02   HGB 10.1* 10.5* 9.9*   HCT 32.5* 32.2* 31.5*    313 306   NEUTROAUTO 55.3 51.8 56.1   LYMPH 3.09  27.9 2.98  29.9 2.88  26.1   MONO 10.3  1.14* 10.4  1.03* 9.7  1.07*   EOS 5.5  0.61* 7.0  0.70* 7.2  0.79*   BMP:  Recent Labs   Lab 07/17/25 0352 07/18/25  0558 07/19/25  0316    140 136   K 4.3 4.3 4.4    103 100   CO2 24 25 23   BUN 26* 24* 29*   CREATININE 1.3 1.4 1.4   * 113* 284*   CALCIUM 9.1 9.3 8.7   MG 2.1  --  1.9   PHOS  --   --  4.3     Significant Imaging: I have reviewed and interpreted all pertinent imaging results/findings within the past 24 hours.      Assessment & Plan  General weakness  Chronic pain syndrome  Fibromyalgia  Depression, unspecified  Generalized anxiety disorder  Recurrent major depressive disorder, in remission  - Generalized fatigue, weakness, and multiple complaints involving multiple systems.  - PT/OT evaluations. Labs overall unrevealing for acute abnormality. Clinically appears improved and in better spirits though reports significant symptoms remain. Monitor with repeat labs.  - Anticipate likely DC with home health / additional support at home to assist. Discussed with patient/family; power out at home so currently unable to use home O2 which will be a barrier to discharge.  - Multitude of symptoms remain prominent. Palliative consulted; appreciate assistance. Continue acetaminophen 1000mg PO q8hr, methocarbamol, hydrocodone-acetaminophen at 10-325mg PO q4hr  PRN.  Dysuria  Nausea and vomiting  -at admission had complaints of dysuria with nausea and vomiting; UA without WBCs or RBCs.  - Outpatient urine culture collected but does not appear she had UA at that time.  - Without significant fevers, further reported dysuria suspicion for significant UTI is lower. No further ciprofloxacin for time being.  - Reports abdominal symptoms persist. Lipase WNL; repeat CT Abd/Pelvis with contrast without acute intra-abdominal abnormality.  - Symptomatic management.  Chronic respiratory failure with hypoxia  CAFL (chronic airflow limitation)  Chronic bronchitis  On home oxygen therapy  COPD (chronic obstructive pulmonary disease)  Former smoker  - History of COPD with chronic resp failure on home O2.  - Continue PRN nebulizations, controller, incentive spirometry, supportive care.  Chronic combined systolic and diastolic congestive heart failure  Essential hypertension  - Chronic; appears stable.  - Continue metoprolol, nifedipine, torsemide, resume losartan at 25mg.  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 pectoris  Athscl heart disease of native coronary artery w/o ang [facility] 05/2022 detailed in HPI  -continue home ASA  -hold home fenofibrate and repatha for now >> resume at DC  -no statin due to allergies  -continue tele monitoring  -EKG PRN concerns     History of CVA (cerebrovascular accident)  Hemiplegia and hemiparesis following cerebral infarction affecting left non-dominant side  -history of   -continue home ASA  -hold home fenofibrate and repatha for now >> resume at DC  -no statin due to allergies  -fall precautions     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 disease  - Chronic; variable control.  - HgbA1c 8.0.  - Continue insulin glargine at 20U subQ BID, insulin aspart at 18U subQ TIDWM, low-dose sliding scale insulin aspart 0-5U subQ TIDWM PRN.  Gastroesophageal reflux disease without esophagitis  -chronic  -PRN  supportive care as indicated  Stage 3 chronic kidney disease  -chronic; recent elevation, now improving.  - Monitor. Avoid nephrotoxins, renally dose medications.  VTE Risk Mitigation (From admission, onward)           Ordered     heparin (porcine) injection 5,000 Units  Every 8 hours         07/16/25 1824     IP VTE HIGH RISK PATIENT  Once         07/16/25 1824     Place sequential compression device  Until discontinued         07/16/25 1824                    Discharge Planning   KYLE: 7/19/2025     Code Status: Full Code   Medical Readiness for Discharge Date:   Discharge Plan A: Home with family              D William Perez MD  Department of Hospital Medicine   [employer] ([address])

## 2025-07-20 NOTE — ASSESSMENT & PLAN NOTE
-[facility] 05/2022 detailed in HPI  -continue home ASA  -hold home fenofibrate and repatha for now >> resume at DC  -no statin due to allergies  -continue tele monitoring  -EKG PRN concerns

## 2025-07-20 NOTE — PROGRESS NOTES
[facility] ([address] Medicine  Progress Note    Patient Name: Indira Waters  MRN: [id number]  Patient Class: IP- Inpatient   Admission Date: 7/16/2025  Length of Stay: 3 days  Attending Physician: HEAVENLY Perez MD  Primary Care Provider: Chun Zapata MD        Subjective     Principal Problem:General weakness        HPI:  Ms. Waters is a 80 YOF with PMHx of HFrEF (EF 40-50% 06/2024), CAD ([facility] 05/2022 with severe 2 vessel, predominantly distal, disease), aortic atherosclerosis with tortuous aorta, HTN, HLD, DM type II, diabetic polyneuropathy,COPD, chronic respiratory insuffiencey/chronic airflow limitation on home oxygen qHS , former smoker, CKD III (baseline SCr 1.1-1.2), chronic anemia, history of CVA with residual left-sided deficits, GERD, fibromyalgia, chronic pain, osteoarthritis, anxiety/depression, and obesity.     She presents to ED with her daughter due to concerns for weakness, fatigue, shortness of breath, palpitations, chest discomfort, light headiness, dizziness, headaches, dysuria, nausea, vomiting, abdominal discomfort, chronic back pain, fever, chills, cough noting I just do not feel good. She denies diarrhea, constipation, hematuria, decreased UOP, blood in stool, seizures, recent falls, or syncope. She lives with her daughter, uses ambualtory aides as needed, and requires some assistance with ADLs.     In the ED she is hypertensive otherwise HDS and afebrile.  CBC with WBC 13, H/H 10.7/33, platelets 319.  Chemistry was , K 4.7, chloride 101, CO2 22, BUN 31, SCr 1.4, glucose 205.  Magnesium 2.0.  LFTs unremarkable.  .  Troponin 0.009.  TSH 1.551.  Hepatitis-C and HIV nonreactive.  UA does not appear infectious.  CXR with cardiomegaly. CT A/P with normal appearance of the kidneys, ureters and bladder; mild pancreatic atrophy.     The patient was placed in observation to the Hospital Medicine Service for further evaluation and treatment.      Overview/Hospital Course:  No notes on file    Interval History: No acute events overnight. Some abdominal discomfort; overall pain at a comparable but relatively controlled level.    Review of Systems   Constitutional:  Positive for fatigue. Negative for chills and fever.   Respiratory:  Negative for cough and shortness of breath.    Cardiovascular:  Negative for chest pain and palpitations.   Gastrointestinal:  Positive for abdominal pain. Negative for nausea and vomiting.   Musculoskeletal:  Positive for arthralgias and myalgias.     Objective:     Vital Signs (Most Recent):  Temp: 97.8 °F (36.6 °C) (07/20/25 1207)  Pulse: 70 (07/20/25 1322)  Resp: 20 (07/20/25 1333)  BP: 132/62 (07/20/25 1207)  SpO2: 97 % (07/20/25 1207) Vital Signs (24h Range):  Temp:  [97.4 °F (36.3 °C)-98.7 °F (37.1 °C)] 97.8 °F (36.6 °C)  Pulse:  [70-78] 70  Resp:  [14-20] 20  SpO2:  [96 %-98 %] 97 %  BP: (132-146)/(60-65) 132/62     Weight: 94 kg (207 lb 3.7 oz)  Body mass index is 40.47 kg/m².    Intake/Output Summary (Last 24 hours) at 7/20/2025 1516  Last data filed at 7/20/2025 1116  Gross per 24 hour   Intake --   Output 1500 ml   Net -1500 ml         Physical Exam  Vitals and nursing note reviewed.   Constitutional:       General: She is not in acute distress.     Appearance: She is well-developed. She is ill-appearing (chronically).   HENT:      Head: Normocephalic and atraumatic.   Eyes:      General:         Right eye: No discharge.         Left eye: No discharge.      Conjunctiva/sclera: Conjunctivae normal.   Cardiovascular:      Rate and Rhythm: Normal rate.      Pulses: Normal pulses.   Pulmonary:      Effort: Pulmonary effort is normal. No respiratory distress.   Abdominal:      Palpations: Abdomen is soft.      Tenderness: There is abdominal tenderness (mild, RLQ).   Musculoskeletal:         General: Normal range of motion.      Right lower leg: Edema present.      Left lower leg: Edema present.   Skin:     General: Skin  is warm and dry.   Neurological:      Mental Status: She is alert and oriented to person, place, and time.           Significant Labs:   CBC:  Recent Labs   Lab 07/18/25  0558 07/19/25  0316 07/20/25  0507   WBC 9.95 11.02 11.41   HGB 10.5* 9.9* 10.1*   HCT 32.2* 31.5* 31.6*    306 292   NEUTROAUTO 51.8 56.1 55.6   LYMPH 2.98  29.9 2.88  26.1 3.10  27.2   MONO 10.4  1.03* 9.7  1.07* 8.6  0.98   EOS 7.0  0.70* 7.2  0.79* 7.6  0.87*   BMP:  Recent Labs   Lab 07/18/25  0558 07/19/25  0316 07/20/25  0507    136 134*   K 4.3 4.4 4.4    100 99   CO2 25 23 24   BUN 24* 29* 31*   CREATININE 1.4 1.4 1.5*   * 284* 297*   CALCIUM 9.3 8.7 9.0   MG  --  1.9 1.9   PHOS  --  4.3 4.0     Significant Imaging: I have reviewed and interpreted all pertinent imaging results/findings within the past 24 hours.        Assessment & Plan  General weakness  Chronic pain syndrome  Fibromyalgia  Depression, unspecified  Generalized anxiety disorder  Recurrent major depressive disorder, in remission  - Generalized fatigue, weakness, and multiple complaints involving multiple systems.  - PT/OT evaluations. Labs overall unrevealing for acute abnormality. Clinically appears improved and in better spirits though reports significant symptoms remain. Monitor with repeat labs.  - Anticipate likely DC with home health / additional support at home to assist. Discussed with patient/family; power out at home so currently unable to use home O2 which will be a barrier to discharge. CM investigating/assisting.  - Multitude of symptoms remain prominent. Palliative consulted; appreciate assistance. Continue acetaminophen 1000mg PO q8hr, methocarbamol, hydrocodone-acetaminophen at 10-325mg PO q4hr PRN.  Dysuria  Nausea and vomiting  -at admission had complaints of dysuria with nausea and vomiting; UA without WBCs or RBCs.  - Outpatient urine culture collected but does not appear she had UA at that time.  - Without significant  fevers, further reported dysuria suspicion for significant UTI is lower. No further ciprofloxacin for time being.  - Reports abdominal symptoms persist. Lipase WNL; repeat CT Abd/Pelvis with contrast without acute intra-abdominal abnormality.  - Symptomatic management.  Chronic respiratory failure with hypoxia  CAFL (chronic airflow limitation)  Chronic bronchitis  On home oxygen therapy  COPD (chronic obstructive pulmonary disease)  Former smoker  - History of COPD with chronic resp failure on home O2.  - Continue PRN nebulizations, controller, incentive spirometry, supportive care.  Chronic combined systolic and diastolic congestive heart failure  Essential hypertension  - Chronic; appears stable.  - Continue metoprolol, nifedipine, torsemide, resume losartan at 25mg.  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 pectoris  Athscl heart disease of native coronary artery w/o ang [facility]  -[facility] 05/2022 detailed in HPI  -continue home ASA  -hold home fenofibrate and repatha for now >> resume at DC  -no statin due to allergies  -continue tele monitoring  -EKG PRN concerns     History of CVA (cerebrovascular accident)  Hemiplegia and hemiparesis following cerebral infarction affecting left non-dominant side  -history of   -continue home ASA  -hold home fenofibrate and repatha for now >> resume at DC  -no statin due to allergies  -fall precautions     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 disease  - Chronic; variable control.  - HgbA1c 8.0.  - Continue insulin glargine at 24U subQ BID, insulin aspart at 18U subQ TIDWM, convert to moderate-dose sliding scale insulin aspart 1-10U subQ TIDWM PRN.  Gastroesophageal reflux disease without esophagitis  -chronic  -PRN supportive care as indicated  Stage 3 chronic kidney disease  -chronic; recent elevation, now improving.  - Monitor. Avoid nephrotoxins, renally dose medications.    VTE Risk Mitigation (From admission, onward)            Ordered     heparin (porcine) injection 5,000 Units  Every 8 hours         07/16/25 1824     IP VTE HIGH RISK PATIENT  Once         07/16/25 1824     Place sequential compression device  Until discontinued         07/16/25 1824                    Discharge Planning   KYLE: 7/19/2025     Code Status: Full Code   Medical Readiness for Discharge Date:   Discharge Plan A: Home with family        D William Perez MD  Department of Hospital Medicine   [facility] ([address])

## 2025-07-20 NOTE — ASSESSMENT & PLAN NOTE
-[facility] 05/2022 detailed in HPI  -continue home ASA  -hold home fenofibrate and repatha for now >> resume at DC  -no statin due to allergies  -continue tele monitoring  -EKG PRN concerns

## 2025-07-20 NOTE — ASSESSMENT & PLAN NOTE
-[facility] 05/2022 detailed in HPI  -continue home ASA  -hold home fenofibrate and repatha for now >> resume at DC  -no statin due to allergies  -continue tele monitoring  -EKG PRN concerns

## 2025-07-20 NOTE — PLAN OF CARE
This case was escalated the Director of Case Management. Per family and the CM the patient currently without power in her apt since Wednesday of this past week. Patient presented in the ED on Thursday and requires home O2 though without power the patient cannot safely return home. In consulting with the assigned CM the family reports that the power is out and there is no alternative for the patient than staying in the hospital as one of the patient's daughter is still staying in the apartment without power. This writer reached out to the [employer] at [phone] to speak to Katya at the [employer] management and was directed to the on-call as the office was closed on the weekends, the on-call number is [phone] who at 9:30 shared that maintenance was on site at the property assessing the situation and would follow up, this writer left the name and phone number. As of 10:48 am there was no update and this writer called the on-call line again for an update. Per the on-call maintenance was still assessing. At 2:57 pm this writer called the on-call to obtain an update and or resolution to the power loss, the on-call representative had my name and number and the instruction to follow up and shared that the power loss was of an [employer] issue , the meter was taken not the property and that Erickson has been communicating with the daughter since power was lost on Wednesday. Erickson was unwilling to speak to this writer, this writer then attempted to reach out to the daughter.     This writer attempted to reach out to the daughter Esha listed on the chart at [phone] to discuss the plan of care and obtain a clearer understanding of the power outage though the call went straight to voicemail, this writer left a general message and requested a return call. CM also reached out to [facility] to inquire about other options for palliative care in the community and at this time only Hospice patient's  are eligible for respite care in a SNF though both palliative and home health could go to an alternative location such as a hotel or another address. CM will continue to follow and support for a safe and sustainable discharge plan.

## 2025-07-20 NOTE — ASSESSMENT & PLAN NOTE
- Chronic; variable control.  - HgbA1c 8.0.  - Continue insulin glargine at 20U subQ BID, insulin aspart at 18U subQ TIDWM, low-dose sliding scale insulin aspart 0-5U subQ TIDWM PRN.

## 2025-07-20 NOTE — PT/OT/SLP PROGRESS
Physical Therapy Treatment    Patient Name:  Indira Waters   MRN:  [id number]    Recommendations:     Discharge Recommendations: Low Intensity Therapy  Discharge Equipment Recommendations: rolling walker , See PT note dated 7/17 for DME recommendations and justifications    Barriers to discharge: None    Assessment:     Indira Waters is a 80 y.o. female admitted with a medical diagnosis of General weakness.  She presents with the following impairments/functional limitations: weakness, impaired endurance, impaired self care skills, impaired functional mobility, gait instability, impaired balance, decreased coordination, decreased upper extremity function, decreased lower extremity function, decreased safety awareness, pain, decreased ROM ;pt with good mobility today, inc amb distance into hallway today, w/ RW and CGA, some cueing for upright posture and to stay closer to RW.    Rehab Prognosis: Good; patient would benefit from acute skilled PT services to address these deficits and reach maximum level of function.    Recent Surgery: * No surgery found *      Plan:     During this hospitalization, patient to be seen 5 x/week to address the identified rehab impairments via gait training, therapeutic activities, therapeutic exercises and progress toward the following goals:    Plan of Care Expires:  07/31/25    Subjective     Chief Complaint: lowback pain  Patient/Family Comments/goals: pt agreeable to session, daughter present as well and assisting in session.   Pain/Comfort:  Location - Orientation 1: lower  Location 1: back  Pain Addressed 1: Reposition, Distraction (daughter applied medicated cream to low back to help w/pain)      Objective:     Communicated with nurse prior to session.  Patient found HOB elevated with oxygen, PureWick, peripheral IV upon PT entry to room.     General Precautions: Standard, diabetic, fall  Orthopedic Precautions: N/A  Braces: N/A  Respiratory Status: Nasal cannula, flow  2 L/min     Functional Mobility:  Bed Mobility:     Supine to Sit: minimum assistance  Transfers:     Sit to Stand:  contact guard assistance with rolling walker  Gait: amb'd ~80' w/ RW and CGA, cueing for upright posture and turning w/RW      AM-PAC 6 CLICK MOBILITY  Turning over in bed (including adjusting bedclothes, sheets and blankets)?: 4  Sitting down on and standing up from a chair with arms (e.g., wheelchair, bedside commode, etc.): 3  Moving from lying on back to sitting on the side of the bed?: 3  Moving to and from a bed to a chair (including a wheelchair)?: 3  Need to walk in hospital room?: 3  Climbing 3-5 steps with a railing?: 3  Basic Mobility Total Score: 19       Treatment & Education:  Pt inst'd in  supine LE ex's of AP's, heelslides, hip abd/add, perf'd commode t/f's in bathroom w/CGA, pt's daughter present and performing hygiene after pt had a BM. She reports she does this at home as well.     Patient left up in chair with all lines intact, call button in reach, nurse notified, daughter present, and LE' elevated..    GOALS:   Multidisciplinary Problems       Physical Therapy Goals          Problem: Physical Therapy    Goal Priority Disciplines Outcome Interventions   Physical Therapy Goal     PT, PT/OT Progressing    Description: 1. [place] with Bed Mobility  2. Supervision with Transfers using LRAD  3. Tolerate OOB x 2 hours   4. Gait  x 150 feet with Supervision using LRAD.   5. Ascend/descend 6 stair with right Handrails Stand-by Assistance using LRAD.                          DME Justifications:  See PT note 7/17    Time Tracking:     PT Received On: 07/20/25  PT Start Time: 1040     PT Stop Time: 1114  PT Total Time (min): 34 min     Billable Minutes: Gait Training 20 and Therapeutic Activity 14    Treatment Type: Treatment  PT/PTA: PTA     Number of PTA visits since last PT visit: 2     07/20/2025

## 2025-07-20 NOTE — ASSESSMENT & PLAN NOTE
- Chronic; variable control.  - HgbA1c 8.0.  - Continue insulin glargine at 24U subQ BID, insulin aspart at 18U subQ TIDWM, convert to moderate-dose sliding scale insulin aspart 1-10U subQ TIDWM PRN.

## 2025-07-20 NOTE — ASSESSMENT & PLAN NOTE
-[facility] 05/2022 detailed in HPI  -continue home ASA  -hold home fenofibrate and repatha for now >> resume at DC  -no statin due to allergies  -continue tele monitoring  -EKG PRN concerns      PAST SURGICAL HISTORY:  No significant past surgical history

## 2025-07-20 NOTE — ASSESSMENT & PLAN NOTE
- Generalized fatigue, weakness, and multiple complaints involving multiple systems.  - PT/OT evaluations. Labs overall unrevealing for acute abnormality. Clinically appears improved and in better spirits though reports significant symptoms remain. Monitor with repeat labs.  - Anticipate likely DC with home health / additional support at home to assist. Discussed with patient/family; power out at home so currently unable to use home O2 which will be a barrier to discharge.  - Multitude of symptoms remain prominent. Palliative consulted; appreciate assistance. Continue acetaminophen 1000mg PO q8hr, methocarbamol, hydrocodone-acetaminophen at 10-325mg PO q4hr PRN.

## 2025-07-20 NOTE — ASSESSMENT & PLAN NOTE
-at admission had complaints of dysuria with nausea and vomiting; UA without WBCs or RBCs.  - Outpatient urine culture collected but does not appear she had UA at that time.  - Without significant fevers, further reported dysuria suspicion for significant UTI is lower. No further ciprofloxacin for time being.  - Reports abdominal symptoms persist. Lipase WNL; repeat CT Abd/Pelvis with contrast without acute intra-abdominal abnormality.  - Symptomatic management.

## 2025-07-20 NOTE — SUBJECTIVE & OBJECTIVE
Interval History: No acute events overnight. Some abdominal discomfort; overall pain at a comparable but relatively controlled level.    Review of Systems   Constitutional:  Positive for fatigue. Negative for chills and fever.   Respiratory:  Negative for cough and shortness of breath.    Cardiovascular:  Negative for chest pain and palpitations.   Gastrointestinal:  Positive for abdominal pain. Negative for nausea and vomiting.   Musculoskeletal:  Positive for arthralgias and myalgias.     Objective:     Vital Signs (Most Recent):  Temp: 97.8 °F (36.6 °C) (07/20/25 1207)  Pulse: 70 (07/20/25 1322)  Resp: 20 (07/20/25 1333)  BP: 132/62 (07/20/25 1207)  SpO2: 97 % (07/20/25 1207) Vital Signs (24h Range):  Temp:  [97.4 °F (36.3 °C)-98.7 °F (37.1 °C)] 97.8 °F (36.6 °C)  Pulse:  [70-78] 70  Resp:  [14-20] 20  SpO2:  [96 %-98 %] 97 %  BP: (132-146)/(60-65) 132/62     Weight: 94 kg (207 lb 3.7 oz)  Body mass index is 40.47 kg/m².    Intake/Output Summary (Last 24 hours) at 7/20/2025 1516  Last data filed at 7/20/2025 1116  Gross per 24 hour   Intake --   Output 1500 ml   Net -1500 ml         Physical Exam  Vitals and nursing note reviewed.   Constitutional:       General: She is not in acute distress.     Appearance: She is well-developed. She is ill-appearing (chronically).   HENT:      Head: Normocephalic and atraumatic.   Eyes:      General:         Right eye: No discharge.         Left eye: No discharge.      Conjunctiva/sclera: Conjunctivae normal.   Cardiovascular:      Rate and Rhythm: Normal rate.      Pulses: Normal pulses.   Pulmonary:      Effort: Pulmonary effort is normal. No respiratory distress.   Abdominal:      Palpations: Abdomen is soft.      Tenderness: There is abdominal tenderness (mild, RLQ).   Musculoskeletal:         General: Normal range of motion.      Right lower leg: Edema present.      Left lower leg: Edema present.   Skin:     General: Skin is warm and dry.   Neurological:      Mental  Status: She is alert and oriented to person, place, and time.           Significant Labs:   CBC:  Recent Labs   Lab 07/18/25  0558 07/19/25  0316 07/20/25  0507   WBC 9.95 11.02 11.41   HGB 10.5* 9.9* 10.1*   HCT 32.2* 31.5* 31.6*    306 292   NEUTROAUTO 51.8 56.1 55.6   LYMPH 2.98  29.9 2.88  26.1 3.10  27.2   MONO 10.4  1.03* 9.7  1.07* 8.6  0.98   EOS 7.0  0.70* 7.2  0.79* 7.6  0.87*   BMP:  Recent Labs   Lab 07/18/25  0558 07/19/25  0316 07/20/25  0507    136 134*   K 4.3 4.4 4.4    100 99   CO2 25 23 24   BUN 24* 29* 31*   CREATININE 1.4 1.4 1.5*   * 284* 297*   CALCIUM 9.3 8.7 9.0   MG  --  1.9 1.9   PHOS  --  4.3 4.0     Significant Imaging: I have reviewed and interpreted all pertinent imaging results/findings within the past 24 hours.

## 2025-07-20 NOTE — SUBJECTIVE & OBJECTIVE
Interval History: No acute events overnight. Not as short of breath today; still some nausea and abdominal pain. Reports a little dizziness today with positional changes as well. Discussed with patient/family at bedside; return home may be a challenge as power is out and she will not have access to her home O2 concentrator at this time.    Review of Systems   Constitutional:  Positive for fatigue. Negative for chills and fever.   Respiratory:  Negative for cough and shortness of breath.    Cardiovascular:  Negative for chest pain and palpitations.   Gastrointestinal:  Positive for abdominal pain and nausea. Negative for vomiting.   Musculoskeletal:  Positive for arthralgias and myalgias.     Objective:     Vital Signs (Most Recent):  Temp: 97.4 °F (36.3 °C) (07/19/25 1925)  Pulse: 75 (07/19/25 1925)  Resp: 15 (07/19/25 1925)  BP: (!) 146/63 (07/19/25 1925)  SpO2: 97 % (07/19/25 1925) Vital Signs (24h Range):  Temp:  [97.1 °F (36.2 °C)-98.6 °F (37 °C)] 97.4 °F (36.3 °C)  Pulse:  [71-78] 75  Resp:  [14-20] 15  SpO2:  [94 %-99 %] 97 %  BP: (124-146)/(56-74) 146/63     Weight: 94 kg (207 lb 3.7 oz)  Body mass index is 40.47 kg/m².    Intake/Output Summary (Last 24 hours) at 7/19/2025 1928  Last data filed at 7/19/2025 1824  Gross per 24 hour   Intake 720 ml   Output 2500 ml   Net -1780 ml         Physical Exam  Vitals and nursing note reviewed.   Constitutional:       General: She is not in acute distress.     Appearance: She is well-developed. She is ill-appearing (chronically).   HENT:      Head: Normocephalic and atraumatic.   Eyes:      General:         Right eye: No discharge.         Left eye: No discharge.      Conjunctiva/sclera: Conjunctivae normal.   Cardiovascular:      Rate and Rhythm: Normal rate.      Pulses: Normal pulses.   Pulmonary:      Effort: Pulmonary effort is normal. No respiratory distress.      Breath sounds: Wheezing (trace bilateral) present.   Abdominal:      Palpations: Abdomen is soft.       Tenderness: There is no abdominal tenderness.   Musculoskeletal:         General: Normal range of motion.      Right lower leg: Edema present.      Left lower leg: Edema present.   Skin:     General: Skin is warm and dry.   Neurological:      Mental Status: She is alert and oriented to person, place, and time.           Significant Labs:   CBC:  Recent Labs   Lab 07/17/25 0352 07/18/25  0558 07/19/25  0316   WBC 11.07 9.95 11.02   HGB 10.1* 10.5* 9.9*   HCT 32.5* 32.2* 31.5*    313 306   NEUTROAUTO 55.3 51.8 56.1   LYMPH 3.09  27.9 2.98  29.9 2.88  26.1   MONO 10.3  1.14* 10.4  1.03* 9.7  1.07*   EOS 5.5  0.61* 7.0  0.70* 7.2  0.79*   BMP:  Recent Labs   Lab 07/17/25 0352 07/18/25  0558 07/19/25 0316    140 136   K 4.3 4.3 4.4    103 100   CO2 24 25 23   BUN 26* 24* 29*   CREATININE 1.3 1.4 1.4   * 113* 284*   CALCIUM 9.1 9.3 8.7   MG 2.1  --  1.9   PHOS  --   --  4.3     Significant Imaging: I have reviewed and interpreted all pertinent imaging results/findings within the past 24 hours.

## 2025-07-20 NOTE — PLAN OF CARE
[facility] ([address])      HOME HEALTH ORDERS  FACE TO FACE ENCOUNTER    Patient Name: Indira Waters  YOB: 1945    PCP: Chun Zapata MD   PCP Address: [address] [place] [address] / [place] [id number]  PCP Phone Number: [phone]  PCP Fax: [phone]    Encounter Date: 7/16/25    Admit to Home Health    Diagnoses:  Active Hospital Problems    Diagnosis  POA    *General weakness [R53.1]  Yes    Chronic systolic (congestive) heart failure [I50.22]  Yes    Goals of care, counseling/discussion [Z71.89]  Not Applicable    Nausea and vomiting [R11.2]  Yes    Dysuria [R30.0]  Yes    On home oxygen therapy [Z99.81]  Not Applicable    Chronic respiratory failure with hypoxia [J96.11]  Yes    Chronic bronchitis [J42]  Yes    COPD (chronic obstructive pulmonary disease) [J44.9]  Yes    History of CVA (cerebrovascular accident) [Z86.73]  Not Applicable    Recurrent major depressive disorder, in remission [F33.40]  Yes    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 pectoris [I25.118]  Yes    Depression, unspecified [F32.A]  Yes    Chronic combined systolic and diastolic congestive heart failure [I50.42]  Yes    Hemiplegia and hemiparesis following cerebral infarction affecting left non-dominant side [I69.354]  Not Applicable    Athscl heart disease of native coronary artery w/o ang pctrs [I25.10]  Yes    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 disease [E11.22]  Yes    Stage 3 chronic kidney disease [N18.30]  Yes    Generalized anxiety disorder [F41.1]  Yes    Essential hypertension [I10]  Yes     Chronic    Former smoker [Z87.891]  Not Applicable     1964: Began smoking. 0.5 ppd.  2020: Quit smoking.      Chronic pain syndrome [G89.4]  Yes    CAFL (chronic airflow limitation) [J44.9]  Yes    Gastroesophageal reflux disease without esophagitis [K21.9]  Yes     Chronic    Fibromyalgia [M79.7]  Yes     Chronic      Resolved Hospital  Problems   No resolved problems to display.       Follow Up Appointments:  Future Appointments   Date Time Provider Department Center   7/29/2025  1:00 PM James Mendez OD [facility] OPTOMTY [facility]   7/30/2025 11:00 AM William Marroquin MD [facility] HEM ONC [facility]   7/31/2025 10:00 AM Alexandra Tong PharmD [facility] [address]   8/7/2025  2:00 PM Holden Pereira MD [facility] PAINMGT [facility]   9/9/2025 11:30 AM Giuliana Montero, BRAN MARTIN [address]   9/26/2025  3:40 PM Jose L Méndez MD [facility] [id number] [facility]   10/1/2025  8:25 AM LAB, [facility] LABDRAW [facility]   10/3/2025  3:00 PM Elvira Hylton NP [facility] PAINMGT [facility]   10/8/2025 11:00 AM Giuliana Montero NP [facility] [address]       Allergies:  Review of patient's allergies indicates:   Allergen Reactions    Bleach (sodium hypochlorite) Shortness Of Breath    Nitrofurantoin macrocrystalline Anaphylaxis    Pcn [penicillins] Shortness Of Breath    Lipitor [atorvastatin] Diarrhea and Rash    Nsaids (non-steroidal anti-inflammatory drug)      Tolerates aspirin      Statins-hmg-coa reductase inhibitors     Toradol [ketorolac]        Medications: Review discharge medications with patient and family and provide education.    Current Medications[1]  Current Discharge Medication List        START taking these medications    Details   capsaicin (ZOSTRIX) 0.025 % cream Apply topically 3 (three) times daily.  Qty: 50 g, Refills: 0      DULoxetine (CYMBALTA) 20 MG capsule Take 1 capsule (20 mg total) by mouth once daily.  Qty: 30 capsule, Refills: 0      meclizine (ANTIVERT) 25 mg tablet Take 1 tablet (25 mg total) by mouth 3 (three) times daily as needed for Dizziness.  Qty: 30 tablet, Refills: 0           CONTINUE these medications which have NOT CHANGED    Details   acetaminophen (TYLENOL) 500 MG tablet Take 2 tablets (1,000 mg total) by mouth every 8 (eight) hours as needed for Pain.  Refills: 0      albuterol (ACCUNEB) 1.25 mg/3 mL Nebu  Take 3 mLs (1.25 mg total) by nebulization every 6 (six) hours as needed (for wheezing or shortness of breath). Rescue  Qty: 300 mL, Refills: 11    Associated Diagnoses: Chronic obstructive pulmonary disease, unspecified COPD type; Mucopurulent chronic bronchitis      allopurinoL (ZYLOPRIM) 300 MG tablet Take 1 tablet (300 mg total) by mouth once daily.  Qty: 90 tablet, Refills: 3    Comments: This prescription was filled on 11/1/2023. Any refills authorized will be placed on file.  Associated Diagnoses: Hyperuricemia      aspirin (ECOTRIN) 81 MG EC tablet Take 1 tablet (81 mg total) by mouth once daily.  Qty: 30 tablet, Refills: 0      evolocumab (REPATHA SURECLICK) 140 mg/mL PnIj Inject 1 mL (140 mg total) into the skin every 14 (fourteen) days.  Qty: 2 each, Refills: 11    Associated Diagnoses: Familial hypercholesterolemia; 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 pectoris; Coronary artery calcification      fenofibrate micronized (LOFIBRA) 134 MG Cap Take 1 capsule (134 mg total) by mouth daily with breakfast.  Qty: 90 capsule, Refills: 3    Associated Diagnoses: Hypertriglyceridemia      HYDROcodone-acetaminophen (NORCO)  mg per tablet Take 1 tablet by mouth every 8 (eight) hours as needed for Pain.  Qty: 90 tablet, Refills: 0    Comments: Quantity prescribed more than 7 day supply? Yes, quantity medically necessary  Associated Diagnoses: Chronic pain disorder; Painful diabetic neuropathy; Lumbar radiculopathy, chronic; Primary osteoarthritis of both knees      insulin glargine U-100, Lantus, (LANTUS SOLOSTAR U-100 INSULIN) 100 unit/mL (3 mL) InPn pen Inject 26 Units into the skin 2 (two) times a day.  Qty: 60 mL, Refills: 3    Comments: No need to fill until requested  Associated Diagnoses: Stage 3a chronic kidney disease      insulin lispro (HUMALOG KWIKPEN INSULIN) 100 unit/mL pen Inject 15 Units into the skin before breakfast AND 15 Units daily with lunch AND 11  Units daily with dinner or evening meal. Plus correction scale. Max TDD 50units..  Qty: 40 mL, Refills: 3    Associated Diagnoses: Type 2 diabetes mellitus with hyperglycemia, with long-term current use of insulin      ipratropium-albuteroL (COMBIVENT RESPIMAT)  mcg/actuation inhaler Inhale 1 puff into the lungs every 6 (six) hours.  Qty: 12 g, Refills: 3      losartan (COZAAR) 50 MG tablet Take 1 tablet (50 mg total) by mouth once daily.  Qty: 90 tablet, Refills: 3    Comments: .  Associated Diagnoses: Essential hypertension; Stage 4 chronic kidney disease      melatonin (MELATIN) 3 mg tablet Take 2 tablets (6 mg total) by mouth nightly as needed for Insomnia.      methocarbamoL (ROBAXIN) 500 MG Tab Take 1 tablet (500 mg total) by mouth 3 (three) times daily.  Qty: 90 tablet, Refills: 2    Comments: This prescription was filled on 4/28/2025. Any refills authorized will be placed on file.  Associated Diagnoses: Chronic pain disorder; Diabetic polyneuropathy associated with diabetes mellitus due to underlying condition; Chronic pain syndrome      metoclopramide HCl (REGLAN) 5 MG tablet TAKE ONE TABLET BY MOUTH FOUR TIMES DAILY AS NEEDED FOR nausea prevention  Qty: 90 tablet, Refills: 3    Comments: This prescription was filled on 6/16/2025. Any refills authorized will be placed on file.  Associated Diagnoses: Gastroparesis      metoprolol succinate (TOPROL-XL) 100 MG 24 hr tablet Take 1 tablet (100 mg total) by mouth once daily.  Qty: 90 tablet, Refills: 3    Comments: .  Associated Diagnoses: Essential hypertension      NIFEdipine (PROCARDIA-XL) 30 MG (OSM) 24 hr tablet TAKE ONE TABLET BY MOUTH TWICE DAILY  Qty: 180 tablet, Refills: 3    Comments: .  Associated Diagnoses: Essential hypertension      nystatin (MYCOSTATIN) cream Apply topically 2 (two) times daily. Underneath Alexus's buttpaste for the rash  Qty: 30 g, Refills: 1    Associated Diagnoses: Intertrigo      nystatin (MYCOSTATIN) powder Apply  "topically 4 (four) times daily.  Qty: 60 g, Refills: 2    Associated Diagnoses: Intertrigo      pregabalin (LYRICA) 150 MG capsule Take 1 capsule (150 mg total) by mouth 2 (two) times daily.  Qty: 60 capsule, Refills: 5    Associated Diagnoses: Painful diabetic neuropathy; Chronic pain syndrome; Primary osteoarthritis of both knees      senna-docusate 8.6-50 mg (PERICOLACE) 8.6-50 mg per tablet Take 1 tablet by mouth 2 (two) times daily as needed for Constipation.  Qty: 60 tablet, Refills: 0      sertraline (ZOLOFT) 100 MG tablet TAKE ONE TABLET BY MOUTH EVERY DAY  Qty: 90 tablet, Refills: 3    Comments: This prescription was filled on 6/5/2024. Any refills authorized will be placed on file.  Associated Diagnoses: Anxiety disorder, unspecified type      torsemide (DEMADEX) 20 MG Tab TAKE ONE TABLET BY MOUTH ONCE DAILY  Qty: 90 tablet, Refills: 3    Associated Diagnoses: Acute on chronic combined systolic and diastolic congestive heart failure      BD ULTRA-FINE MICRO PEN NEEDLE 32 gauge x 1/4" Ndle Use with insulin 5 times a day.  Qty: 400 each, Refills: 10    Associated Diagnoses: Type 2 diabetes mellitus with hyperglycemia, with long-term current use of insulin      blood sugar diagnostic Strp To check BG 4 times daily, to use with insurance preferred meter  Qty: 200 each, Refills: 11    Associated Diagnoses: Type 2 diabetes mellitus with chronic kidney disease, with long-term current use of insulin, unspecified CKD stage      blood-glucose meter kit To check BG 4 times daily, to use with insurance preferred meter  Qty: 1 each, Refills: 0    Associated Diagnoses: Type 2 diabetes mellitus with chronic kidney disease, with long-term current use of insulin, unspecified CKD stage      glucagon (BAQSIMI) 3 mg/actuation Spry 3 mg by Nasal route as needed (Severe Hypoglycemia).  Qty: 1 each, Refills: 1    Associated Diagnoses: Type 2 diabetes mellitus with chronic kidney disease, with long-term current use of insulin, " unspecified CKD stage      lancets Misc To check BG 4 times daily, to use with insurance preferred meter  Qty: 200 each, Refills: 11    Associated Diagnoses: Type 2 diabetes mellitus with chronic kidney disease, with long-term current use of insulin, unspecified CKD stage      nitroGLYCERIN (NITROSTAT) 0.4 MG SL tablet DISSOLVE 1 TABLET UNDER THE TONGUE EVERY 5 MINUTES AS NEEDED FOR CHEST PAIN. DO NOT EXCEED A TOTAL OF 3 DOSES IN 15 MINUTES.  Qty: 25 tablet, Refills: 11    Associated Diagnoses: Angina at rest           STOP taking these medications       miconazole nitrate 2% (MICOTIN) 2 % Oint Comments:   Reason for Stopping:                 I have seen and examined this patient within the last 30 days. My clinical findings that support the need for the home health skilled services and home bound status are the following:   Weakness/numbness causing balance and gait disturbance due to Weakness/Debility making it taxing to leave home.     Diet:   cardiac diet and diabetic diet 2000 calorie    Referrals/ Consults  Physical Therapy to evaluate and treat. Evaluate for home safety and equipment needs; Establish/upgrade home exercise program. Perform / instruct on therapeutic exercises, gait training, transfer training, and Range of Motion.  Occupational Therapy to evaluate and treat. Evaluate home environment for safety and equipment needs. Perform/Instruct on transfers, ADL training, ROM, and therapeutic exercises.   to evaluate for community resources/long-range planning.  Aide to provide assistance with personal care, ADLs, and vital signs.    Activities:   activity as tolerated    Nursing:   Agency to admit patient within 24 hours of hospital discharge unless specified on physician order or at patient request    SN to complete comprehensive assessment including routine vital signs. Instruct on disease process and s/s of complications to report to MD. Review/verify medication list sent home with the  patient at time of discharge  and instruct patient/caregiver as needed. Frequency may be adjusted depending on start of care date.     Skilled nurse to perform up to 3 visits PRN for symptoms related to diagnosis    Notify MD if SBP > 160 or < 90; DBP > 90 or < 50; HR > 120 or < 50; Temp > 101; O2 < 88%    Ok to schedule additional visits based on staff availability and patient request on consecutive days within the home health episode.    When multiple disciplines ordered:    Start of Care occurs on Sunday - Wednesday schedule remaining discipline evaluations as ordered on separate consecutive days following the start of care.    Thursday SOC -schedule subsequent evaluations Friday and Monday the following week.     Friday - Saturday SOC - schedule subsequent discipline evaluations on consecutive days starting Monday of the following week.    For all post-discharge communication and subsequent orders please contact patient's primary care physician.    Miscellaneous   Routine Skin for Bedridden Patients: Instruct patient/caregiver to apply moisture barrier cream to all skin folds and wet areas in perineal area daily and after baths and all bowel movements.  Heart Failure:      SN to instruct on the following:    Instruct on the definition of CHF.   Instruct on the signs/sympoms of CHF to be reported.   Instruct on and monitor daily weights.   Instruct on factors that cause exacerbation.   Instruct on action, dose, schedule, and side effects of medications.   Instruct on diet as prescribed.   Instruct on activity allowed.   Instruct on life-style modifications for life long management of CHF   SN to assess compliance with daily weights, diet, medications, fluid retention,    safety precautions, activities permitted and life-style modifications.   Additional 1-2 SN visits per week as needed for signs and symptoms     of CHF exacerbation.      For Weight Gain > 2-3 lbs in 1 day or 4-6 lbs over 1 week notify  PCP.    Wound Care Orders  no    I certify that this patient is confined to her home and needs intermittent skilled nursing care, physical therapy, and occupational therapy.           [1]   Current Facility-Administered Medications   Medication Dose Route Frequency Provider Last Rate Last Admin    acetaminophen tablet 1,000 mg  1,000 mg Oral Q8H HEAVENLY Perez MD   1,000 mg at 07/20/25 0508    albuterol-ipratropium 2.5 mg-0.5 mg/3 mL nebulizer solution 3 mL  3 mL Nebulization Q4H PRN Rina Blanc, BRAN        allopurinoL tablet 300 mg  300 mg Oral Daily Rina Blanc, NP   300 mg at 07/20/25 0858    aspirin EC tablet 81 mg  81 mg Oral Daily Rina Blanc, NP   81 mg at 07/20/25 0857    capsaicin 0.025 % cream   Topical (Top) TID HEAVENLY Perez MD   Given at 07/20/25 0858    dextrose 50% injection 12.5 g  12.5 g Intravenous PRN Rina Blanc, NP        dextrose 50% injection 25 g  25 g Intravenous PRN Rina Blanc, NP        DULoxetine DR capsule 20 mg  20 mg Oral Daily HEAVENLY Perez MD   20 mg at 07/20/25 0857    glucagon (human recombinant) injection 1 mg  1 mg Intramuscular PRN Rina Blanc, NP        glucose chewable tablet 16 g  16 g Oral PRN Rina Blanc, NP        glucose chewable tablet 24 g  24 g Oral PRN Rina Blanc, NP        heparin (porcine) injection 5,000 Units  5,000 Units Subcutaneous Q8H Rina Blanc, NP   5,000 Units at 07/20/25 1334    hydrALAZINE injection 10 mg  10 mg Intravenous Q6H PRN Rian Blanc, NP        HYDROcodone-acetaminophen  mg per tablet 1 tablet  1 tablet Oral Q4H PRN HEAVENLY Perez MD   1 tablet at 07/20/25 1333    hydrOXYzine HCL tablet 25 mg  25 mg Oral Q4H PRN Rina Blanc, NP   25 mg at 07/19/25 2047    insulin aspart U-100 pen 0-5 Units  0-5 Units Subcutaneous QID (AC + HS) PRN Rina Blanc, NP   4 Units at 07/20/25 1210    insulin aspart U-100 pen 18 Units  18 Units  Subcutaneous TIDWM HEAVENLY Perez MD   18 Units at 07/20/25 1210    insulin glargine U-100 (Lantus) pen 20 Units  20 Units Subcutaneous BID HEAVENLY Perez MD   20 Units at 07/20/25 0900    ipratropium-albuteroL inhaler 1 puff  1 puff Inhalation Q6H HEAVENLY Perez MD   1 puff at 07/20/25 1322    meclizine tablet 25 mg  25 mg Oral TID PRN HEAVENLY Perez MD        melatonin tablet 6 mg  6 mg Oral Nightly PRN Rina Blanc, NP   6 mg at 07/19/25 2047    methocarbamoL tablet 500 mg  500 mg Oral TID Rina Blanc, NP   500 mg at 07/20/25 0857    metoprolol succinate (TOPROL-XL) 24 hr tablet 100 mg  100 mg Oral Daily Rina Blanc, NP   100 mg at 07/20/25 0858    NIFEdipine 24 hr tablet 30 mg  30 mg Oral BID Rina Blanc, NP   30 mg at 07/20/25 0858    ondansetron disintegrating tablet 8 mg  8 mg Oral Q6H PRN Rina Blanc, NP        ondansetron injection 8 mg  8 mg Intravenous Q6H PRN Rina Blanc, NP        polyethylene glycol packet 17 g  17 g Oral BID PRN Rina Blanc, NP        pregabalin capsule 150 mg  150 mg Oral BID Rina Blanc, NP   150 mg at 07/20/25 0858    senna-docusate 8.6-50 mg per tablet 1 tablet  1 tablet Oral BID Rina Blanc, NP   1 tablet at 07/20/25 0858    sertraline tablet 100 mg  100 mg Oral Daily Rina Blanc, NP   100 mg at 07/20/25 0857    sodium chloride 0.9% flush 10 mL  10 mL Intravenous PRN Rina Blanc, NP

## 2025-07-20 NOTE — PLAN OF CARE
CM met with patient and daughter to discuss power outage issue. Patient daughter stated there is an electrical issue at the home and [employer]. Power has been off since Thursday. The landlord has been notified of the issue but no accomodations have been made on their behalf.    Daughter provided  phone number Marilia [phone], no answer, voicemail left.

## 2025-07-21 LAB
ABSOLUTE EOSINOPHIL (OHS): 0.84 K/UL
ABSOLUTE MONOCYTE (OHS): 0.92 K/UL (ref 0.3–1)
ABSOLUTE NEUTROPHIL COUNT (OHS): 6.46 K/UL (ref 1.8–7.7)
ANION GAP (OHS): 11 MMOL/L (ref 8–16)
BACTERIA UR CULT: ABNORMAL
BASOPHILS # BLD AUTO: 0.07 K/UL
BASOPHILS NFR BLD AUTO: 0.6 %
BUN SERPL-MCNC: 27 MG/DL (ref 8–23)
CALCIUM SERPL-MCNC: 8.9 MG/DL (ref 8.7–10.5)
CHLORIDE SERPL-SCNC: 103 MMOL/L (ref 95–110)
CO2 SERPL-SCNC: 24 MMOL/L (ref 23–29)
CREAT SERPL-MCNC: 1.2 MG/DL (ref 0.5–1.4)
ERYTHROCYTE [DISTWIDTH] IN BLOOD BY AUTOMATED COUNT: 15.5 % (ref 11.5–14.5)
GFR SERPLBLD CREATININE-BSD FMLA CKD-EPI: 46 ML/MIN/1.73/M2
GLUCOSE SERPL-MCNC: 177 MG/DL (ref 70–110)
HCT VFR BLD AUTO: 29.8 % (ref 37–48.5)
HGB BLD-MCNC: 9.5 GM/DL (ref 12–16)
IMM GRANULOCYTES # BLD AUTO: 0.05 K/UL (ref 0–0.04)
IMM GRANULOCYTES NFR BLD AUTO: 0.5 % (ref 0–0.5)
LYMPHOCYTES # BLD AUTO: 2.53 K/UL (ref 1–4.8)
MAGNESIUM SERPL-MCNC: 2.1 MG/DL (ref 1.6–2.6)
MCH RBC QN AUTO: 27 PG (ref 27–31)
MCHC RBC AUTO-ENTMCNC: 31.9 G/DL (ref 32–36)
MCV RBC AUTO: 85 FL (ref 82–98)
NUCLEATED RBC (/100WBC) (OHS): 0 /100 WBC
PHOSPHATE SERPL-MCNC: 4.2 MG/DL (ref 2.7–4.5)
PLATELET # BLD AUTO: 293 K/UL (ref 150–450)
PMV BLD AUTO: 11.8 FL (ref 9.2–12.9)
POCT GLUCOSE: 158 MG/DL (ref 70–110)
POCT GLUCOSE: 206 MG/DL (ref 70–110)
POCT GLUCOSE: 212 MG/DL (ref 70–110)
POCT GLUCOSE: 220 MG/DL (ref 70–110)
POCT GLUCOSE: 319 MG/DL (ref 70–110)
POCT GLUCOSE: 339 MG/DL (ref 70–110)
POTASSIUM SERPL-SCNC: 4.5 MMOL/L (ref 3.5–5.1)
RBC # BLD AUTO: 3.52 M/UL (ref 4–5.4)
RELATIVE EOSINOPHIL (OHS): 7.7 %
RELATIVE LYMPHOCYTE (OHS): 23.3 % (ref 18–48)
RELATIVE MONOCYTE (OHS): 8.5 % (ref 4–15)
RELATIVE NEUTROPHIL (OHS): 59.4 % (ref 38–73)
SODIUM SERPL-SCNC: 138 MMOL/L (ref 136–145)
WBC # BLD AUTO: 10.87 K/UL (ref 3.9–12.7)

## 2025-07-21 PROCEDURE — 25000003 PHARM REV CODE 250: Performed by: INTERNAL MEDICINE

## 2025-07-21 PROCEDURE — 99900035 HC TECH TIME PER 15 MIN (STAT)

## 2025-07-21 PROCEDURE — 80048 BASIC METABOLIC PNL TOTAL CA: CPT | Performed by: INTERNAL MEDICINE

## 2025-07-21 PROCEDURE — 63600175 PHARM REV CODE 636 W HCPCS: Performed by: NURSE PRACTITIONER

## 2025-07-21 PROCEDURE — 94640 AIRWAY INHALATION TREATMENT: CPT

## 2025-07-21 PROCEDURE — 36415 COLL VENOUS BLD VENIPUNCTURE: CPT | Performed by: INTERNAL MEDICINE

## 2025-07-21 PROCEDURE — 94799 UNLISTED PULMONARY SVC/PX: CPT

## 2025-07-21 PROCEDURE — 83735 ASSAY OF MAGNESIUM: CPT | Performed by: INTERNAL MEDICINE

## 2025-07-21 PROCEDURE — 25000003 PHARM REV CODE 250: Performed by: NURSE PRACTITIONER

## 2025-07-21 PROCEDURE — 25000242 PHARM REV CODE 250 ALT 637 W/ HCPCS: Performed by: NURSE PRACTITIONER

## 2025-07-21 PROCEDURE — 84100 ASSAY OF PHOSPHORUS: CPT | Performed by: INTERNAL MEDICINE

## 2025-07-21 PROCEDURE — 94761 N-INVAS EAR/PLS OXIMETRY MLT: CPT

## 2025-07-21 PROCEDURE — 97530 THERAPEUTIC ACTIVITIES: CPT

## 2025-07-21 PROCEDURE — 85025 COMPLETE CBC W/AUTO DIFF WBC: CPT | Performed by: INTERNAL MEDICINE

## 2025-07-21 PROCEDURE — 11000001 HC ACUTE MED/SURG PRIVATE ROOM

## 2025-07-21 PROCEDURE — 25000242 PHARM REV CODE 250 ALT 637 W/ HCPCS: Performed by: PHYSICIAN ASSISTANT

## 2025-07-21 PROCEDURE — 27000221 HC OXYGEN, UP TO 24 HOURS

## 2025-07-21 RX ORDER — GRANULES FOR ORAL 3 G/1
3 POWDER ORAL ONCE
Qty: 3 G | Refills: 0 | Status: SHIPPED | OUTPATIENT
Start: 2025-07-21 | End: 2025-07-21

## 2025-07-21 RX ADMIN — PREGABALIN 150 MG: 75 CAPSULE ORAL at 08:07

## 2025-07-21 RX ADMIN — INSULIN ASPART 4 UNITS: 100 INJECTION, SOLUTION INTRAVENOUS; SUBCUTANEOUS at 05:07

## 2025-07-21 RX ADMIN — IPRATROPIUM BROMIDE AND ALBUTEROL 1 PUFF: 20; 100 SPRAY, METERED RESPIRATORY (INHALATION) at 08:07

## 2025-07-21 RX ADMIN — NIFEDIPINE 30 MG: 30 TABLET, FILM COATED, EXTENDED RELEASE ORAL at 08:07

## 2025-07-21 RX ADMIN — DULOXETINE HYDROCHLORIDE 20 MG: 20 CAPSULE, DELAYED RELEASE ORAL at 09:07

## 2025-07-21 RX ADMIN — DOCUSATE SODIUM AND SENNOSIDES 1 TABLET: 8.6; 5 TABLET ORAL at 09:07

## 2025-07-21 RX ADMIN — HEPARIN SODIUM 5000 UNITS: 5000 INJECTION INTRAVENOUS; SUBCUTANEOUS at 01:07

## 2025-07-21 RX ADMIN — HEPARIN SODIUM 5000 UNITS: 5000 INJECTION INTRAVENOUS; SUBCUTANEOUS at 05:07

## 2025-07-21 RX ADMIN — HEPARIN SODIUM 5000 UNITS: 5000 INJECTION INTRAVENOUS; SUBCUTANEOUS at 09:07

## 2025-07-21 RX ADMIN — IPRATROPIUM BROMIDE AND ALBUTEROL 1 PUFF: 20; 100 SPRAY, METERED RESPIRATORY (INHALATION) at 03:07

## 2025-07-21 RX ADMIN — ALLOPURINOL 300 MG: 300 TABLET ORAL at 09:07

## 2025-07-21 RX ADMIN — MECLIZINE HYDROCHLORIDE 25 MG: 25 TABLET ORAL at 09:07

## 2025-07-21 RX ADMIN — HYDROXYZINE HYDROCHLORIDE 25 MG: 25 TABLET, FILM COATED ORAL at 08:07

## 2025-07-21 RX ADMIN — HYDROCODONE BITARTRATE AND ACETAMINOPHEN 1 TABLET: 10; 325 TABLET ORAL at 05:07

## 2025-07-21 RX ADMIN — HYDROCODONE BITARTRATE AND ACETAMINOPHEN 1 TABLET: 10; 325 TABLET ORAL at 01:07

## 2025-07-21 RX ADMIN — NIFEDIPINE 30 MG: 30 TABLET, FILM COATED, EXTENDED RELEASE ORAL at 09:07

## 2025-07-21 RX ADMIN — MELATONIN TAB 3 MG 6 MG: 3 TAB at 08:07

## 2025-07-21 RX ADMIN — ACETAMINOPHEN 1000 MG: 500 TABLET, FILM COATED ORAL at 05:07

## 2025-07-21 RX ADMIN — SERTRALINE HYDROCHLORIDE 100 MG: 100 TABLET ORAL at 09:07

## 2025-07-21 RX ADMIN — INSULIN ASPART 4 UNITS: 100 INJECTION, SOLUTION INTRAVENOUS; SUBCUTANEOUS at 09:07

## 2025-07-21 RX ADMIN — METOPROLOL SUCCINATE 100 MG: 50 TABLET, EXTENDED RELEASE ORAL at 09:07

## 2025-07-21 RX ADMIN — INSULIN GLARGINE 24 UNITS: 100 INJECTION, SOLUTION SUBCUTANEOUS at 08:07

## 2025-07-21 RX ADMIN — ASPIRIN 81 MG: 81 TABLET, COATED ORAL at 09:07

## 2025-07-21 RX ADMIN — POLYETHYLENE GLYCOL 3350 17 G: 17 POWDER, FOR SOLUTION ORAL at 08:07

## 2025-07-21 RX ADMIN — INSULIN ASPART 18 UNITS: 100 INJECTION, SOLUTION INTRAVENOUS; SUBCUTANEOUS at 09:07

## 2025-07-21 RX ADMIN — DOCUSATE SODIUM AND SENNOSIDES 1 TABLET: 8.6; 5 TABLET ORAL at 08:07

## 2025-07-21 RX ADMIN — METHOCARBAMOL 500 MG: 500 TABLET ORAL at 09:07

## 2025-07-21 RX ADMIN — INSULIN GLARGINE 24 UNITS: 100 INJECTION, SOLUTION SUBCUTANEOUS at 09:07

## 2025-07-21 RX ADMIN — INSULIN ASPART 18 UNITS: 100 INJECTION, SOLUTION INTRAVENOUS; SUBCUTANEOUS at 05:07

## 2025-07-21 RX ADMIN — ACETAMINOPHEN 1000 MG: 500 TABLET, FILM COATED ORAL at 08:07

## 2025-07-21 RX ADMIN — PREGABALIN 150 MG: 75 CAPSULE ORAL at 09:07

## 2025-07-21 RX ADMIN — HYDROCODONE BITARTRATE AND ACETAMINOPHEN 1 TABLET: 10; 325 TABLET ORAL at 08:07

## 2025-07-21 RX ADMIN — IPRATROPIUM BROMIDE AND ALBUTEROL SULFATE 3 ML: .5; 2.5 SOLUTION RESPIRATORY (INHALATION) at 07:07

## 2025-07-21 RX ADMIN — CAPSAICIN: 0.25 CREAM TOPICAL at 08:07

## 2025-07-21 RX ADMIN — POLYETHYLENE GLYCOL 3350 17 G: 17 POWDER, FOR SOLUTION ORAL at 09:07

## 2025-07-21 RX ADMIN — METHOCARBAMOL 500 MG: 500 TABLET ORAL at 03:07

## 2025-07-21 RX ADMIN — CAPSAICIN: 0.25 CREAM TOPICAL at 09:07

## 2025-07-21 RX ADMIN — METHOCARBAMOL 500 MG: 500 TABLET ORAL at 08:07

## 2025-07-21 RX ADMIN — INSULIN ASPART 8 UNITS: 100 INJECTION, SOLUTION INTRAVENOUS; SUBCUTANEOUS at 12:07

## 2025-07-21 RX ADMIN — INSULIN ASPART 18 UNITS: 100 INJECTION, SOLUTION INTRAVENOUS; SUBCUTANEOUS at 12:07

## 2025-07-21 NOTE — ASSESSMENT & PLAN NOTE
-[facility] 05/2022 detailed in HPI  -continue home ASA  -hold home fenofibrate and repatha for now >> resume at DC  -no statin due to allergies  -continue tele monitoring  -EKG PRN concerns

## 2025-07-21 NOTE — PT/OT/SLP PROGRESS
"Physical Therapy Treatment    Patient Name:  Indira Waters   MRN:  [id number]    Recommendations:     Discharge Recommendations: Low Intensity Therapy  Discharge Equipment Recommendations: none  Barriers to discharge: none anticipated at this time.     Assessment:     Indira Waters is a 80 y.o. female admitted with a medical diagnosis of General weakness.  She presents with the following impairments/functional limitations: weakness, impaired endurance, impaired self care skills, impaired functional mobility, impaired balance, gait instability, decreased coordination, decreased upper extremity function, decreased lower extremity function .  Amb to/from bathroom (approx 15'+15') with CGA-SBA and use of RW. Pt ambulating w decreased alonso and step length compared to PLOF. Pt limited by nausea and "not feeling good" today.     Rehab Prognosis: Good; patient would benefit from acute skilled PT services to address these deficits and reach maximum level of function.    Recent Surgery: * No surgery found *      Plan:     During this hospitalization, patient to be seen 5 x/week to address the identified rehab impairments via gait training, therapeutic activities, therapeutic exercises, neuromuscular re-education and progress toward the following goals:    Plan of Care Expires:  07/31/25    Subjective     Chief Complaint: nausea and "not feeling good"  Patient/Family Comments/goals: daughter reporting that he mobility seems lower today. Agreeable to participate with PT.   Pain/Comfort:  Pain Rating 1: 0/10      Objective:     Communicated with TERRY Choi prior to session.  Patient found supine with oxygen, telemetry, peripheral IV upon PT entry to room.     General Precautions: Standard, diabetic, fall  Orthopedic Precautions: N/A  Braces: N/A  Respiratory Status: Nasal cannula, flow 1.5 L/min     Functional Mobility:  Bed Mobility:  Rolling Right: stand by assistance  Scooting: stand by assistance  Bridging: " stand by assistance  Supine to Sit: stand by assistance  Sit to Supine: stand by assistance  Transfers:     Sit to Stand:  contact guard assistance with rolling walker  Toilet Transfer: contact guard assistance with  rolling walker  using  Stand Pivot  Gait: 15' +15' with CGA-SBA and use of RW.   Balance: static sitting balance on commode x 5-8 minutes with ADRY UE support at RW.       AM-PAC 6 CLICK MOBILITY  Turning over in bed (including adjusting bedclothes, sheets and blankets)?: 4  Sitting down on and standing up from a chair with arms (e.g., wheelchair, bedside commode, etc.): 3  Moving from lying on back to sitting on the side of the bed?: 3  Moving to and from a bed to a chair (including a wheelchair)?: 3  Need to walk in hospital room?: 3  Climbing 3-5 steps with a railing?: 3  Basic Mobility Total Score: 19       Treatment & Education:  Educated on PT role, PT POC.      Patient left supine in bed w HOB elevated with all lines intact, call button in reach, and daughter present..    GOALS:   Multidisciplinary Problems       Physical Therapy Goals          Problem: Physical Therapy    Goal Priority Disciplines Outcome Interventions   Physical Therapy Goal     PT, PT/OT Progressing    Description: 1. [place] with Bed Mobility  2. Supervision with Transfers using LRAD  3. Tolerate OOB x 2 hours   4. Gait  x 150 feet with Supervision using LRAD.   5. Ascend/descend 6 stair with right Handrails Stand-by Assistance using LRAD.                          DME Justifications:  No DME recommended requiring DME justifications    Time Tracking:     PT Received On: 07/21/25  PT Start Time: 0936     PT Stop Time: 1001  PT Total Time (min): 25 min     Billable Minutes: Therapeutic Activity 25    Treatment Type: Treatment  PT/PTA: PT     Number of PTA visits since last PT visit: 0     07/21/2025

## 2025-07-21 NOTE — ASSESSMENT & PLAN NOTE
-[facility] 05/2022 detailed in HPI  -continue home ASA  -hold home fenofibrate and repatha for now >> resume at DC  -no statin due to allergies  -continue tele monitoring  -EKG PRN concerns

## 2025-07-21 NOTE — PLAN OF CARE
This writer reached out to the property management at [phone] and s/w with Reinier the  who reported that he has been communicating with a daughter, Jazzmine. Per Reinier last week Wednesday / Thursday the tenant was having interment power and the property management was sending out an  on Friday though family involved [employer] and the power company who then came on to the property and remove the meter. Per Reinier the  will finish up today and also reach out to [employer] in attempt to resolve this in timely matter. This writer did inquire about moving the patient to another apartment unit if unable to resolve the power issue today, Reinier shared that hopefully this can be addressed today though will this can be discussed. This writer shared name and contact number for follow up.   This writer reached out to [employer] customer service [phone] who reported the utility bill is under Jazzmine's name, and confirmed they did a temporary disconnect of service and took the specific meter to that apartment unit only, [employer] is reporting that the [facility] that a permit needs to be issue by Dept of Authority office stating the repair has been cleared, it can sent electronically. Per Jonah Tracey shared if the permit is sent over before 5 pm today the power can be restored same day. The meter was removed to shut down the power for the  to repair the line safely. CM will continue to follow and support for a safe and sustainable discharge plan.

## 2025-07-22 VITALS
BODY MASS INDEX: 40.69 KG/M2 | TEMPERATURE: 98 F | DIASTOLIC BLOOD PRESSURE: 71 MMHG | SYSTOLIC BLOOD PRESSURE: 167 MMHG | RESPIRATION RATE: 16 BRPM | WEIGHT: 207.25 LBS | OXYGEN SATURATION: 97 % | HEART RATE: 73 BPM | HEIGHT: 60 IN

## 2025-07-22 LAB
ABSOLUTE EOSINOPHIL (OHS): 0.77 K/UL
ABSOLUTE MONOCYTE (OHS): 0.88 K/UL (ref 0.3–1)
ABSOLUTE NEUTROPHIL COUNT (OHS): 5.29 K/UL (ref 1.8–7.7)
ANION GAP (OHS): 9 MMOL/L (ref 8–16)
BASOPHILS # BLD AUTO: 0.1 K/UL
BASOPHILS NFR BLD AUTO: 1 %
BUN SERPL-MCNC: 26 MG/DL (ref 8–23)
CALCIUM SERPL-MCNC: 8.8 MG/DL (ref 8.7–10.5)
CHLORIDE SERPL-SCNC: 102 MMOL/L (ref 95–110)
CO2 SERPL-SCNC: 25 MMOL/L (ref 23–29)
CREAT SERPL-MCNC: 1.3 MG/DL (ref 0.5–1.4)
ERYTHROCYTE [DISTWIDTH] IN BLOOD BY AUTOMATED COUNT: 15.7 % (ref 11.5–14.5)
GFR SERPLBLD CREATININE-BSD FMLA CKD-EPI: 42 ML/MIN/1.73/M2
GLUCOSE SERPL-MCNC: 262 MG/DL (ref 70–110)
HCT VFR BLD AUTO: 30.3 % (ref 37–48.5)
HGB BLD-MCNC: 9.7 GM/DL (ref 12–16)
IMM GRANULOCYTES # BLD AUTO: 0.04 K/UL (ref 0–0.04)
IMM GRANULOCYTES NFR BLD AUTO: 0.4 % (ref 0–0.5)
LYMPHOCYTES # BLD AUTO: 2.73 K/UL (ref 1–4.8)
MAGNESIUM SERPL-MCNC: 2 MG/DL (ref 1.6–2.6)
MCH RBC QN AUTO: 27.5 PG (ref 27–31)
MCHC RBC AUTO-ENTMCNC: 32 G/DL (ref 32–36)
MCV RBC AUTO: 86 FL (ref 82–98)
NUCLEATED RBC (/100WBC) (OHS): 0 /100 WBC
PHOSPHATE SERPL-MCNC: 4.1 MG/DL (ref 2.7–4.5)
PLATELET # BLD AUTO: 268 K/UL (ref 150–450)
PMV BLD AUTO: 11.5 FL (ref 9.2–12.9)
POCT GLUCOSE: 236 MG/DL (ref 70–110)
POCT GLUCOSE: 273 MG/DL (ref 70–110)
POTASSIUM SERPL-SCNC: 4.9 MMOL/L (ref 3.5–5.1)
RBC # BLD AUTO: 3.53 M/UL (ref 4–5.4)
RELATIVE EOSINOPHIL (OHS): 7.8 %
RELATIVE LYMPHOCYTE (OHS): 27.8 % (ref 18–48)
RELATIVE MONOCYTE (OHS): 9 % (ref 4–15)
RELATIVE NEUTROPHIL (OHS): 54 % (ref 38–73)
SODIUM SERPL-SCNC: 136 MMOL/L (ref 136–145)
WBC # BLD AUTO: 9.81 K/UL (ref 3.9–12.7)

## 2025-07-22 PROCEDURE — 99233 SBSQ HOSP IP/OBS HIGH 50: CPT | Mod: 25,,, | Performed by: STUDENT IN AN ORGANIZED HEALTH CARE EDUCATION/TRAINING PROGRAM

## 2025-07-22 PROCEDURE — 80048 BASIC METABOLIC PNL TOTAL CA: CPT | Performed by: INTERNAL MEDICINE

## 2025-07-22 PROCEDURE — 94640 AIRWAY INHALATION TREATMENT: CPT

## 2025-07-22 PROCEDURE — 63600175 PHARM REV CODE 636 W HCPCS: Performed by: NURSE PRACTITIONER

## 2025-07-22 PROCEDURE — 27000221 HC OXYGEN, UP TO 24 HOURS

## 2025-07-22 PROCEDURE — 25000003 PHARM REV CODE 250: Performed by: INTERNAL MEDICINE

## 2025-07-22 PROCEDURE — 99900035 HC TECH TIME PER 15 MIN (STAT)

## 2025-07-22 PROCEDURE — 84100 ASSAY OF PHOSPHORUS: CPT | Performed by: INTERNAL MEDICINE

## 2025-07-22 PROCEDURE — 99497 ADVNCD CARE PLAN 30 MIN: CPT | Mod: 25,,, | Performed by: STUDENT IN AN ORGANIZED HEALTH CARE EDUCATION/TRAINING PROGRAM

## 2025-07-22 PROCEDURE — 85025 COMPLETE CBC W/AUTO DIFF WBC: CPT | Performed by: INTERNAL MEDICINE

## 2025-07-22 PROCEDURE — 94761 N-INVAS EAR/PLS OXIMETRY MLT: CPT

## 2025-07-22 PROCEDURE — 83735 ASSAY OF MAGNESIUM: CPT | Performed by: INTERNAL MEDICINE

## 2025-07-22 PROCEDURE — 36415 COLL VENOUS BLD VENIPUNCTURE: CPT | Performed by: INTERNAL MEDICINE

## 2025-07-22 PROCEDURE — 25000242 PHARM REV CODE 250 ALT 637 W/ HCPCS: Performed by: PHYSICIAN ASSISTANT

## 2025-07-22 PROCEDURE — 25000003 PHARM REV CODE 250: Performed by: NURSE PRACTITIONER

## 2025-07-22 RX ADMIN — PREGABALIN 150 MG: 75 CAPSULE ORAL at 08:07

## 2025-07-22 RX ADMIN — DULOXETINE HYDROCHLORIDE 20 MG: 20 CAPSULE, DELAYED RELEASE ORAL at 08:07

## 2025-07-22 RX ADMIN — HYDROCODONE BITARTRATE AND ACETAMINOPHEN 1 TABLET: 10; 325 TABLET ORAL at 10:07

## 2025-07-22 RX ADMIN — DOCUSATE SODIUM AND SENNOSIDES 1 TABLET: 8.6; 5 TABLET ORAL at 08:07

## 2025-07-22 RX ADMIN — CAPSAICIN: 0.25 CREAM TOPICAL at 08:07

## 2025-07-22 RX ADMIN — HEPARIN SODIUM 5000 UNITS: 5000 INJECTION INTRAVENOUS; SUBCUTANEOUS at 05:07

## 2025-07-22 RX ADMIN — ACETAMINOPHEN 1000 MG: 500 TABLET, FILM COATED ORAL at 05:07

## 2025-07-22 RX ADMIN — INSULIN ASPART 18 UNITS: 100 INJECTION, SOLUTION INTRAVENOUS; SUBCUTANEOUS at 08:07

## 2025-07-22 RX ADMIN — INSULIN GLARGINE 24 UNITS: 100 INJECTION, SOLUTION SUBCUTANEOUS at 08:07

## 2025-07-22 RX ADMIN — SERTRALINE HYDROCHLORIDE 100 MG: 100 TABLET ORAL at 08:07

## 2025-07-22 RX ADMIN — ASPIRIN 81 MG: 81 TABLET, COATED ORAL at 08:07

## 2025-07-22 RX ADMIN — METOPROLOL SUCCINATE 100 MG: 50 TABLET, EXTENDED RELEASE ORAL at 08:07

## 2025-07-22 RX ADMIN — INSULIN ASPART 6 UNITS: 100 INJECTION, SOLUTION INTRAVENOUS; SUBCUTANEOUS at 08:07

## 2025-07-22 RX ADMIN — HYDROCODONE BITARTRATE AND ACETAMINOPHEN 1 TABLET: 10; 325 TABLET ORAL at 05:07

## 2025-07-22 RX ADMIN — POLYETHYLENE GLYCOL 3350 17 G: 17 POWDER, FOR SOLUTION ORAL at 08:07

## 2025-07-22 RX ADMIN — NIFEDIPINE 30 MG: 30 TABLET, FILM COATED, EXTENDED RELEASE ORAL at 08:07

## 2025-07-22 RX ADMIN — METHOCARBAMOL 500 MG: 500 TABLET ORAL at 08:07

## 2025-07-22 RX ADMIN — INSULIN ASPART 4 UNITS: 100 INJECTION, SOLUTION INTRAVENOUS; SUBCUTANEOUS at 12:07

## 2025-07-22 RX ADMIN — IPRATROPIUM BROMIDE AND ALBUTEROL 1 PUFF: 20; 100 SPRAY, METERED RESPIRATORY (INHALATION) at 07:07

## 2025-07-22 RX ADMIN — INSULIN ASPART 18 UNITS: 100 INJECTION, SOLUTION INTRAVENOUS; SUBCUTANEOUS at 12:07

## 2025-07-22 RX ADMIN — ALLOPURINOL 300 MG: 300 TABLET ORAL at 08:07

## 2025-07-22 NOTE — ASSESSMENT & PLAN NOTE
-at admission had complaints of dysuria with nausea and vomiting; UA without WBCs or RBCs.  - Outpatient urine culture collected but does not appear she had UA at that time. Discussed with Dr. Zapata; would prefer to treat. Options limited by allergies. Plan for fosfomycin as next best option given PCN, nitrofurantoin allergies.  - Reports abdominal symptoms persist. Lipase WNL; repeat CT Abd/Pelvis with contrast without acute intra-abdominal abnormality.  - Symptomatic management.

## 2025-07-22 NOTE — SUBJECTIVE & OBJECTIVE
Medications:  Continuous Infusions:  Scheduled Meds:   acetaminophen  1,000 mg Oral Q8H    allopurinoL  300 mg Oral Daily    aspirin  81 mg Oral Daily    capsaicin   Topical (Top) TID    DULoxetine  20 mg Oral Daily    heparin (porcine)  5,000 Units Subcutaneous Q8H    insulin aspart U-100  18 Units Subcutaneous TIDWM    insulin glargine U-100  24 Units Subcutaneous BID    ipratropium-albuteroL  1 puff Inhalation Q6H WAKE    methocarbamoL  500 mg Oral TID    metoprolol succinate  100 mg Oral Daily    NIFEdipine  30 mg Oral BID    polyethylene glycol  17 g Oral BID    pregabalin  150 mg Oral BID    senna-docusate  1 tablet Oral BID    sertraline  100 mg Oral Daily     PRN Meds:  Current Facility-Administered Medications:     albuterol-ipratropium, 3 mL, Nebulization, Q4H PRN    dextrose 50%, 12.5 g, Intravenous, PRN    dextrose 50%, 25 g, Intravenous, PRN    glucagon (human recombinant), 1 mg, Intramuscular, PRN    glucose, 16 g, Oral, PRN    glucose, 24 g, Oral, PRN    hydrALAZINE, 10 mg, Intravenous, Q6H PRN    HYDROcodone-acetaminophen, 1 tablet, Oral, Q4H PRN    hydrOXYzine HCL, 25 mg, Oral, Q4H PRN    insulin aspart U-100, 0-10 Units, Subcutaneous, QID (AC + HS) PRN    meclizine, 25 mg, Oral, TID PRN    melatonin, 6 mg, Oral, Nightly PRN    ondansetron, 8 mg, Oral, Q6H PRN    ondansetron, 8 mg, Intravenous, Q6H PRN    sodium chloride 0.9%, 10 mL, Intravenous, PRN    Objective:     Vital Signs (Most Recent):  Temp: 97.5 °F (36.4 °C) (07/22/25 1131)  Pulse: 73 (07/22/25 1131)  Resp: 16 (07/22/25 1131)  BP: (!) 167/71 (07/22/25 1131)  SpO2: 97 % (07/22/25 1131) Vital Signs (24h Range):  Temp:  [97.5 °F (36.4 °C)-98.3 °F (36.8 °C)] 97.5 °F (36.4 °C)  Pulse:  [70-79] 73  Resp:  [16-20] 16  SpO2:  [96 %-99 %] 97 %  BP: (123-167)/(56-71) 167/71     Weight: 94 kg (207 lb 3.7 oz)  Body mass index is 40.47 kg/m².       Physical Exam  Vitals and nursing note reviewed.   Constitutional:       General: She is not in acute  distress.     Comments: Chronically ill appearing   HENT:      Head: Normocephalic and atraumatic.   Eyes:      Extraocular Movements: Extraocular movements intact.   Pulmonary:      Comments: NC in place  Neurological:      Mental Status: She is alert.      Comments: Awake, alert, conversant. Good insight            Review of Symptoms        Living Arrangements:  Lives with family    Psychosocial/Cultural:   See Palliative Psychosocial Note: Yes  - lives at home with her daughter Esha  - used to work at [employer] and then [employer]. Retired around 15 years ago   - enjoys watching tv and spending time with her daughter Esha  **Primary  to Follow**  Palliative Care  Consult: No        Advance Care Planning   Advance Directives:   Medical Power of : Yes      Decision Making:  Patient answered questions  Goals of Care: The patient endorses that what is most important right now is to focus on improvement of condition.     Accordingly, we have decided that the best plan to meet the patient's goals includes continuing with treatment         Significant Labs: reviewed  CBC:   Recent Labs   Lab 07/22/25  0346   WBC 9.81   HGB 9.7*   HCT 30.3*   MCV 86        BMP:  Recent Labs   Lab 07/22/25  0346   *      K 4.9      CO2 25   BUN 26*   CREATININE 1.3   CALCIUM 8.8   MG 2.0     LFT:  Lab Results   Component Value Date    AST 17 07/17/2025    ALKPHOS 54 07/17/2025    BILITOT 0.2 07/17/2025     Albumin:   Albumin   Date Value Ref Range Status   07/17/2025 3.4 (L) 3.5 - 5.2 g/dL Final   03/06/2025 4.0 3.5 - 5.2 g/dL Final     Protein:   Protein Total   Date Value Ref Range Status   07/17/2025 7.0 6.0 - 8.4 gm/dL Final     Total Protein   Date Value Ref Range Status   03/06/2025 8.2 6.0 - 8.4 g/dL Final     Lactic acid:   Lab Results   Component Value Date    LACTATE 1.0 06/23/2024    LACTATE 0.9 06/23/2024       Significant Imaging: reviewed

## 2025-07-22 NOTE — PLAN OF CARE
[facility] ([address])      HOME HEALTH ORDERS  FACE TO FACE ENCOUNTER    Patient Name: Indira Waters  YOB: 1945    PCP: Chun Zapata MD   PCP Address: [address] [place] [address] / [place] [id number]  PCP Phone Number: [phone]  PCP Fax: [phone]    Encounter Date: 7/16/25    Admit to Home Health    Diagnoses:  Active Hospital Problems    Diagnosis  POA    *General weakness [R53.1]  Yes    Chronic systolic (congestive) heart failure [I50.22]  Yes    Goals of care, counseling/discussion [Z71.89]  Not Applicable    Nausea and vomiting [R11.2]  Yes    Dysuria [R30.0]  Yes    On home oxygen therapy [Z99.81]  Not Applicable    Chronic respiratory failure with hypoxia [J96.11]  Yes    Chronic bronchitis [J42]  Yes    COPD (chronic obstructive pulmonary disease) [J44.9]  Yes    History of CVA (cerebrovascular accident) [Z86.73]  Not Applicable    Recurrent major depressive disorder, in remission [F33.40]  Yes    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 pectoris [I25.118]  Yes    Depression, unspecified [F32.A]  Yes    Chronic combined systolic and diastolic congestive heart failure [I50.42]  Yes    Hemiplegia and hemiparesis following cerebral infarction affecting left non-dominant side [I69.354]  Not Applicable    Athscl heart disease of native coronary artery w/o ang pctrs [I25.10]  Yes    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 disease [E11.22]  Yes    Stage 3 chronic kidney disease [N18.30]  Yes    Generalized anxiety disorder [F41.1]  Yes    Essential hypertension [I10]  Yes     Chronic    Former smoker [Z87.891]  Not Applicable     1964: Began smoking. 0.5 ppd.  2020: Quit smoking.      Chronic pain syndrome [G89.4]  Yes    CAFL (chronic airflow limitation) [J44.9]  Yes    Gastroesophageal reflux disease without esophagitis [K21.9]  Yes     Chronic    Fibromyalgia [M79.7]  Yes     Chronic      Resolved Hospital  Problems   No resolved problems to display.       Follow Up Appointments:  Future Appointments   Date Time Provider Department Center   7/29/2025  1:00 PM James Menedz OD [facility] OPTOMTY [facility]   7/30/2025 11:00 AM William Marroquin MD [facility] HEM ONC [facility]   7/31/2025 10:00 AM Alexandra Tong PharmD NOM [facility] [address]   8/7/2025  2:00 PM Holden Pereira MD [facility] PAINMGT [facility]   9/9/2025 11:30 AM Giuliana Montero, BRAN [facility]IN [address]   9/26/2025  3:40 PM Jose L Méndez MD [facility] [id number] [facility]   10/1/2025  8:25 AM LAB, [facility] LABDRAW [facility]   10/3/2025  3:00 PM Elvira Hylton NP [facility] PAINMGT [facility]   10/8/2025 11:00 AM Giuliana Montero NP [facility] [address]       Allergies:  Review of patient's allergies indicates:   Allergen Reactions    Bleach (sodium hypochlorite) Shortness Of Breath    Nitrofurantoin macrocrystalline Anaphylaxis    Pcn [penicillins] Shortness Of Breath    Lipitor [atorvastatin] Diarrhea and Rash    Nsaids (non-steroidal anti-inflammatory drug)      Tolerates aspirin      Statins-hmg-coa reductase inhibitors     Toradol [ketorolac]        Medications: Review discharge medications with patient and family and provide education.    Current Medications[1]     Medication List        PAUSE taking these medications      losartan 50 MG tablet  Wait to take this until your doctor or other care provider tells you to start again.  Commonly known as: COZAAR  Take 1 tablet (50 mg total) by mouth once daily.     torsemide 20 MG Tab  Wait to take this until your doctor or other care provider tells you to start again.  Commonly known as: DEMADEX  TAKE ONE TABLET BY MOUTH ONCE DAILY            START taking these medications      capsaicin 0.025 % cream  Commonly known as: ZOSTRIX  Apply topically 3 (three) times daily.     DULoxetine 20 MG capsule  Commonly known as: CYMBALTA  Take 1 capsule (20 mg total) by mouth once daily.     meclizine 25 mg  "tablet  Commonly known as: ANTIVERT  Take 1 tablet (25 mg total) by mouth 3 (three) times daily as needed for Dizziness.            CONTINUE taking these medications      acetaminophen 500 MG tablet  Commonly known as: TYLENOL  Take 2 tablets (1,000 mg total) by mouth every 8 (eight) hours as needed for Pain.     albuterol 1.25 mg/3 mL Nebu  Commonly known as: ACCUNEB  Take 3 mLs (1.25 mg total) by nebulization every 6 (six) hours as needed (for wheezing or shortness of breath). Rescue     allopurinoL 300 MG tablet  Commonly known as: ZYLOPRIM  Take 1 tablet (300 mg total) by mouth once daily.     aspirin 81 MG EC tablet  Commonly known as: ECOTRIN  Take 1 tablet (81 mg total) by mouth once daily.     BAQSIMI 3 mg/actuation Spry  Generic drug: glucagon  3 mg by Nasal route as needed (Severe Hypoglycemia).     BD ULTRA-FINE MICRO PEN NEEDLE 32 gauge x 1/4" Ndle  Generic drug: pen needle, diabetic  Use with insulin 5 times a day.     blood sugar diagnostic Strp  To check BG 4 times daily, to use with insurance preferred meter     blood-glucose meter kit  To check BG 4 times daily, to use with insurance preferred meter     CombiVENT RESPIMAT  mcg/actuation inhaler  Generic drug: ipratropium-albuteroL  Inhale 1 puff into the lungs every 6 (six) hours.     fenofibrate micronized 134 MG Cap  Commonly known as: LOFIBRA  Take 1 capsule (134 mg total) by mouth daily with breakfast.     HYDROcodone-acetaminophen  mg per tablet  Commonly known as: NORCO  Take 1 tablet by mouth every 8 (eight) hours as needed for Pain.     insulin lispro 100 unit/mL pen  Commonly known as: HumaLOG KwikPen Insulin  Inject 15 Units into the skin before breakfast AND 15 Units daily with lunch AND 11 Units daily with dinner or evening meal. Plus correction scale. Max TDD 50units..     lancets Misc  To check BG 4 times daily, to use with insurance preferred meter     LANTUS SOLOSTAR U-100 INSULIN 100 unit/mL (3 mL) Inpn pen  Generic " drug: insulin glargine U-100 (Lantus)  Inject 26 Units into the skin 2 (two) times a day.     melatonin 3 mg tablet  Commonly known as: MELATIN  Take 2 tablets (6 mg total) by mouth nightly as needed for Insomnia.     methocarbamoL 500 MG Tab  Commonly known as: Robaxin  Take 1 tablet (500 mg total) by mouth 3 (three) times daily.     metoclopramide HCl 5 MG tablet  Commonly known as: REGLAN  TAKE ONE TABLET BY MOUTH FOUR TIMES DAILY AS NEEDED FOR nausea prevention     metoprolol succinate 100 MG 24 hr tablet  Commonly known as: TOPROL-XL  Take 1 tablet (100 mg total) by mouth once daily.     NIFEdipine 30 MG (OSM) 24 hr tablet  Commonly known as: PROCARDIA-XL  TAKE ONE TABLET BY MOUTH TWICE DAILY     nitroGLYCERIN 0.4 MG SL tablet  Commonly known as: NITROSTAT  DISSOLVE 1 TABLET UNDER THE TONGUE EVERY 5 MINUTES AS NEEDED FOR CHEST PAIN. DO NOT EXCEED A TOTAL OF 3 DOSES IN 15 MINUTES.     * nystatin cream  Commonly known as: MYCOSTATIN  Apply topically 2 (two) times daily. Underneath Alexus's buttpaste for the rash     * nystatin powder  Commonly known as: MYCOSTATIN  Apply topically 4 (four) times daily.     pregabalin 150 MG capsule  Commonly known as: LYRICA  Take 1 capsule (150 mg total) by mouth 2 (two) times daily.     REPATHA SURECLICK 140 mg/mL Pnij  Generic drug: evolocumab  Inject 1 mL (140 mg total) into the skin every 14 (fourteen) days.     sertraline 100 MG tablet  Commonly known as: ZOLOFT  TAKE ONE TABLET BY MOUTH EVERY DAY     STOOL SOFTENER-STIMULANT LAXAT 8.6-50 mg per tablet  Generic drug: senna-docusate  Take 1 tablet by mouth 2 (two) times daily as needed for Constipation.           * This list has 2 medication(s) that are the same as other medications prescribed for you. Read the directions carefully, and ask your doctor or other care provider to review them with you.                ASK your doctor about these medications      fosfomycin 3 gram Pack  Commonly known as: MONUROL  Take 3 g by  mouth once. for 1 dose  Ask about: Should I take this medication?                I have seen and examined this patient within the last 30 days. My clinical findings that support the need for the home health skilled services and home bound status are the following:no   Weakness/numbness causing balance and gait disturbance due to Weakness/Debility making it taxing to leave home.     Diet:   cardiac diet and diabetic diet 2000 calorie    Labs:  none    Referrals/ Consults  Physical Therapy to evaluate and treat. Evaluate for home safety and equipment needs; Establish/upgrade home exercise program. Perform / instruct on therapeutic exercises, gait training, transfer training, and Range of Motion.  Occupational Therapy to evaluate and treat. Evaluate home environment for safety and equipment needs. Perform/Instruct on transfers, ADL training, ROM, and therapeutic exercises.   to evaluate for community resources/long-range planning.  Aide to provide assistance with personal care, ADLs, and vital signs.    Activities:   activity as tolerated    Nursing:   Agency to admit patient within 24 hours of hospital discharge unless specified on physician order or at patient request    SN to complete comprehensive assessment including routine vital signs. Instruct on disease process and s/s of complications to report to MD. Review/verify medication list sent home with the patient at time of discharge  and instruct patient/caregiver as needed. Frequency may be adjusted depending on start of care date.     Skilled nurse to perform up to 3 visits PRN for symptoms related to diagnosis    Notify MD if SBP > 160 or < 90; DBP > 90 or < 50; HR > 120 or < 50; Temp > 101; O2 < 88%.    Ok to schedule additional visits based on staff availability and patient request on consecutive days within the home health episode.    When multiple disciplines ordered:    Start of Care occurs on Sunday - Wednesday schedule remaining discipline  evaluations as ordered on separate consecutive days following the start of care.    Thursday SOC -schedule subsequent evaluations Friday and Monday the following week.     Friday - Saturday SOC - schedule subsequent discipline evaluations on consecutive days starting Monday of the following week.    For all post-discharge communication and subsequent orders please contact patient's primary care physician.    Miscellaneous   Home Oxygen:  Oxygen at 2 L/min nasal canula to be used:  Continuously.        I certify that this patient is confined to her home and needs intermittent skilled nursing care, physical therapy, and occupational therapy.               [1]   Current Facility-Administered Medications   Medication Dose Route Frequency Provider Last Rate Last Admin    acetaminophen tablet 1,000 mg  1,000 mg Oral Q8H HEAVENLY Perez MD   1,000 mg at 07/22/25 0524    albuterol-ipratropium 2.5 mg-0.5 mg/3 mL nebulizer solution 3 mL  3 mL Nebulization Q4H PRN Rina Blanc, NP   3 mL at 07/21/25 1905    allopurinoL tablet 300 mg  300 mg Oral Daily Rina Blanc, NP   300 mg at 07/22/25 0839    aspirin EC tablet 81 mg  81 mg Oral Daily Rina Blanc, NP   81 mg at 07/22/25 0839    capsaicin 0.025 % cream   Topical (Top) TID HEAVENLY Perez MD   Given at 07/22/25 0840    dextrose 50% injection 12.5 g  12.5 g Intravenous PRN Rina Blanc, NP        dextrose 50% injection 25 g  25 g Intravenous PRN Rina Blanc, NP        DULoxetine DR capsule 20 mg  20 mg Oral Daily HEAVENLY Perez MD   20 mg at 07/22/25 0839    glucagon (human recombinant) injection 1 mg  1 mg Intramuscular PRN Rina Blanc, NP        glucose chewable tablet 16 g  16 g Oral PRN Rina Blanc, NP        glucose chewable tablet 24 g  24 g Oral PRN Rina Blanc, NP        heparin (porcine) injection 5,000 Units  5,000 Units Subcutaneous Q8H Rina Blanc, NP   5,000 Units at 07/22/25 0525     hydrALAZINE injection 10 mg  10 mg Intravenous Q6H PRN Rina Blanc, NP        HYDROcodone-acetaminophen  mg per tablet 1 tablet  1 tablet Oral Q4H PRN HEAVENLY Perez MD   1 tablet at 07/22/25 1039    hydrOXYzine HCL tablet 25 mg  25 mg Oral Q4H PRN Rina Blanc, NP   25 mg at 07/21/25 2031    insulin aspart U-100 pen 0-10 Units  0-10 Units Subcutaneous QID (AC + HS) PRN HEAVENLY Perez MD   4 Units at 07/22/25 1205    insulin aspart U-100 pen 18 Units  18 Units Subcutaneous TIDWM HEAVENLY Perez MD   18 Units at 07/22/25 1205    insulin glargine U-100 (Lantus) pen 24 Units  24 Units Subcutaneous BID HEAVENLY Perez MD   24 Units at 07/22/25 0840    ipratropium-albuteroL inhaler 1 puff  1 puff Inhalation Q6H Gillett Marifer Boudreaux PA-C   1 puff at 07/22/25 0742    meclizine tablet 25 mg  25 mg Oral TID PRN HEAVENLY Perez MD   25 mg at 07/21/25 0900    melatonin tablet 6 mg  6 mg Oral Nightly PRN Rina Blanc, NP   6 mg at 07/21/25 2031    methocarbamoL tablet 500 mg  500 mg Oral TID Rina Blanc, NP   500 mg at 07/22/25 0839    metoprolol succinate (TOPROL-XL) 24 hr tablet 100 mg  100 mg Oral Daily Rina Blanc, NP   100 mg at 07/22/25 0839    NIFEdipine 24 hr tablet 30 mg  30 mg Oral BID Rina Blanc, NP   30 mg at 07/22/25 0839    ondansetron disintegrating tablet 8 mg  8 mg Oral Q6H PRN Rina Blanc, NP        ondansetron injection 8 mg  8 mg Intravenous Q6H PRN Rina Blanc, NP        polyethylene glycol packet 17 g  17 g Oral BID HEAVENLY Perez MD   17 g at 07/22/25 0839    pregabalin capsule 150 mg  150 mg Oral BID Rina Blanc, NP   150 mg at 07/22/25 0838    senna-docusate 8.6-50 mg per tablet 1 tablet  1 tablet Oral BID Rina Blanc, NP   1 tablet at 07/22/25 0839    sertraline tablet 100 mg  100 mg Oral Daily Rina Blanc, NP   100 mg at 07/22/25 0839    sodium chloride 0.9% flush 10 mL  10 mL Intravenous  NETO Blanc, Rina FARR, NP

## 2025-07-22 NOTE — SUBJECTIVE & OBJECTIVE
Interval History: No acute events overnight. Some abdominal discomfort; overall pain at a comparable but relatively controlled level.    Review of Systems   Constitutional:  Positive for fatigue. Negative for chills and fever.   Respiratory:  Negative for cough and shortness of breath.    Cardiovascular:  Negative for chest pain and palpitations.   Gastrointestinal:  Positive for abdominal pain and nausea. Negative for vomiting.   Musculoskeletal:  Positive for arthralgias and myalgias.     Objective:     Vital Signs (Most Recent):  Temp: 98.3 °F (36.8 °C) (07/21/25 2338)  Pulse: 78 (07/21/25 2338)  Resp: 20 (07/21/25 2338)  BP: (!) 124/59 (07/21/25 2338)  SpO2: 97 % (07/21/25 2338) Vital Signs (24h Range):  Temp:  [97.3 °F (36.3 °C)-98.3 °F (36.8 °C)] 98.3 °F (36.8 °C)  Pulse:  [71-79] 78  Resp:  [16-20] 20  SpO2:  [96 %-99 %] 97 %  BP: (123-142)/(56-66) 124/59     Weight: 94 kg (207 lb 3.7 oz)  Body mass index is 40.47 kg/m².    Intake/Output Summary (Last 24 hours) at 7/21/2025 2339  Last data filed at 7/21/2025 1632  Gross per 24 hour   Intake 420 ml   Output 850 ml   Net -430 ml         Physical Exam  Vitals and nursing note reviewed.   Constitutional:       General: She is not in acute distress.     Appearance: She is well-developed. She is ill-appearing (chronically).   HENT:      Head: Normocephalic and atraumatic.   Eyes:      General:         Right eye: No discharge.         Left eye: No discharge.      Conjunctiva/sclera: Conjunctivae normal.   Cardiovascular:      Rate and Rhythm: Normal rate.      Pulses: Normal pulses.   Pulmonary:      Effort: Pulmonary effort is normal. No respiratory distress.   Abdominal:      Palpations: Abdomen is soft.      Tenderness: There is abdominal tenderness (mild, RLQ).   Musculoskeletal:         General: Normal range of motion.      Right lower leg: Edema present.      Left lower leg: Edema present.   Skin:     General: Skin is warm and dry.   Neurological:      Mental  Status: She is alert and oriented to person, place, and time.           Significant Labs:   CBC:  Recent Labs   Lab 07/19/25 0316 07/20/25  0507 07/21/25  0429   WBC 11.02 11.41 10.87   HGB 9.9* 10.1* 9.5*   HCT 31.5* 31.6* 29.8*    292 293   NEUTROAUTO 56.1 55.6 59.4   LYMPH 2.88  26.1 3.10  27.2 2.53  23.3   MONO 9.7  1.07* 8.6  0.98 8.5  0.92   EOS 7.2  0.79* 7.6  0.87* 7.7  0.84*   BMP:  Recent Labs   Lab 07/19/25 0316 07/20/25  0507 07/21/25  0429    134* 138   K 4.4 4.4 4.5    99 103   CO2 23 24 24   BUN 29* 31* 27*   CREATININE 1.4 1.5* 1.2   * 297* 177*   CALCIUM 8.7 9.0 8.9   MG 1.9 1.9 2.1   PHOS 4.3 4.0 4.2     Significant Imaging: I have reviewed and interpreted all pertinent imaging results/findings within the past 24 hours.

## 2025-07-22 NOTE — ASSESSMENT & PLAN NOTE
7/22/2025:  - chart reviewed in depth  - discussed with primary team   - patient seen at bedside where she is joined by her daughter Esha  - they recalled me from last week   - patient awake, alert, conversant  - they shared more about the disappointing week they have had, with the loss of power at home since Wed  - they stated their landlord has helped set them up with temporary housing until home electricity is fixed   - it is clear that the prolonged hospitalization as a result has taken a toll on patient  - she has had difficulty with sleeping overnight given the disruptions with vital signs and such   - discussed the effect poor sleep can have on mood and pain  - her chronic pain is steady   - has felt well with the initiation of the duloxetine, without any adverse effects of note   - has had some cough, which they discussed with primary team, with plan for cxray for evaluation to ease patients mind   - they are aware of plan for home palliative support on discharge  - they are aware of importance of f/u with pain management  - plan on re-establishing with rheumatology too which was mentioned in pain management notes  - in terms of her on/off fatigue, discussed importance of aerobic exercise and activity to help boost mood and help vs fatigue too. Previously had discussed role of swimming in helping on that front and limiting joint discomfort too.   - answered all questions to best of ability  - provided emotional support and listening ear   - updated primary team regarding conversation  [] continue with current full management. Her hope/goal is for getting on track medically and remains motivated to do so.   [] recommend close f/u with pain management on discharge. Consideration for rheum re-involvement for fibromyalgia, noted in pain management note.   [] noted HCPOA form on file outlining her daughter Esha as HCPOA  [] recommend home palliative medicine on discharge for continued support.

## 2025-07-22 NOTE — PROGRESS NOTES
[facility] ([address])  Palliative Medicine  Progress Note    Patient Name: Indira Waters  MRN: [id number]  Admission Date: 7/16/2025  Hospital Length of Stay: 5 days  Code Status: Full Code   Attending Provider: Nu Abernathy MD  Consulting Provider: Errol Pedersen MD  Primary Care Physician: Chun Zapata MD  Principal Problem:General weakness    Patient information was obtained from patient, relative(s), past medical records, and primary team.      Assessment/Plan:     Neuro  Chronic pain syndrome  - noted  - management per pain management who she follows with outpatient. Also plans to re-establish with rheumatology.   - chronic back/knee pain  - noted plan for steroid knee injection next pain management appointment  - pain overall stable    Psychiatric  Depression, unspecified  - noted hx  - noted on sertraline which patient doesn't feel has been overly effective-  - has had some issues with decreased motivation, but denies feeling depressed  [] continue duloxetine 20mg daily, with plan for continued outpatient uptitration of duloxetine overtime and future de-escalation of sertraline. They are aware of need for monitoring how she does while on both of them. Can be further managed by pcp/pain management. Duloxetine can help vs fibromyalgia and mood stabilization.     Pulmonary  COPD (chronic obstructive pulmonary disease)  - noted on 2L O2 at home chronically  - management per primary team    Cardiac/Vascular  Chronic systolic (congestive) heart failure  - noted hx  - noted last echo EF 40%  - management per primary team    Renal/  Stage 3 chronic kidney disease  - noted hx  - management per primary team    Orthopedic  Fibromyalgia  - noted hx  - follows closely with pain management, next appt early 8/2025  - noted on lyrica, methocarbamol, norco   - management of chronic pain/fibromyalgia is per pain management  - noted used to follow with rheumatology, with last seen 2022 when they  recommended consideration for duloxetine for her fibromyalgia/chronic pain syndrome  - patient plans for re-establishment with rheumatology too  [] she is aware of importance of exercise/activity as possible  [] continue duloxetine 20mg daily, with plan, as preferred by patient, for continued outpatient uptitration of duloxetine overtime and future de-escalation of sertraline, as duloxetine increased.. Can be further managed by pcp/pain management.     Palliative Care  Goals of care, counseling/discussion  7/22/2025:  - chart reviewed in depth  - discussed with primary team   - patient seen at bedside where she is joined by her daughter Esha  - they recalled me from last week   - patient awake, alert, conversant  - they shared more about the disappointing week they have had, with the loss of power at home since Wed  - they stated their landlord has helped set them up with temporary housing until home electricity is fixed   - it is clear that the prolonged hospitalization as a result has taken a toll on patient  - she has had difficulty with sleeping overnight given the disruptions with vital signs and such   - discussed the effect poor sleep can have on mood and pain  - her chronic pain is steady   - has felt well with the initiation of the duloxetine, without any adverse effects of note   - has had some cough, which they discussed with primary team, with plan for cxray for evaluation to ease patients mind   - they are aware of plan for home palliative support on discharge  - they are aware of importance of f/u with pain management  - plan on re-establishing with rheumatology too which was mentioned in pain management notes  - in terms of her on/off fatigue, discussed importance of aerobic exercise and activity to help boost mood and help vs fatigue too. Previously had discussed role of swimming in helping on that front and limiting joint discomfort too.   - answered all questions to best of ability  - provided  "emotional support and listening ear   - updated primary team regarding conversation  [] continue with current full management. Her hope/goal is for getting on track medically and remains motivated to do so.   [] recommend close f/u with pain management on discharge. Consideration for rheum re-involvement for fibromyalgia, noted in pain management note.   [] noted HCPOA form on file outlining her daughter Esha as HCPOA  [] recommend home palliative medicine on discharge for continued support.         I will follow-up with patient. Please contact us if you have any additional questions.    Subjective:     Chief Complaint:   Chief Complaint   Patient presents with    Dysuria     Being treated by Dr. Mcgee for RANJEET for past 2 months w/o improvement. Painful urination as well. Sent to ER.        HPI:   Per HPI: "Ms. Waters is a 80 YOF with PMHx of HFrEF (EF 40-50% 06/2024), CAD ([facility] 05/2022 with severe 2 vessel, predominantly distal, disease), aortic atherosclerosis with tortuous aorta, HTN, HLD, DM type II, diabetic polyneuropathy,COPD, chronic respiratory insuffiencey/chronic airflow limitation on home oxygen qHS , former smoker, CKD III (baseline SCr 1.1-1.2), chronic anemia, history of CVA with residual left-sided deficits, GERD, fibromyalgia, chronic pain, osteoarthritis, anxiety/depression, and obesity. She presents to ED with her daughter due to concerns for weakness, fatigue, shortness of breath, palpitations, chest discomfort, light headiness, dizziness, headaches, dysuria, nausea, vomiting, abdominal discomfort, chronic back pain, fever, chills, cough noting I just do not feel good. She denies diarrhea, constipation, hematuria, decreased UOP, blood in stool, seizures, recent falls, or syncope. She lives with her daughter, uses ambualtory aides as needed, and requires some assistance with ADLs. In the ED she is hypertensive otherwise HDS and afebrile.  CBC with WBC 13, H/H 10.7/33, platelets 319.  Chemistry " "was , K 4.7, chloride 101, CO2 22, BUN 31, SCr 1.4, glucose 205.  Magnesium 2.0.  LFTs unremarkable.  .  Troponin 0.009.  TSH 1.551.  Hepatitis-C and HIV nonreactive.  UA does not appear infectious.  CXR with cardiomegaly. CT A/P with normal appearance of the kidneys, ureters and bladder; mild pancreatic atrophy. The patient was placed in observation to the Hospital Medicine Service for further evaluation and treatment. "    Palliative medicine consulted for assistance with support/ACP.     Hospital Course:  No notes on file      Medications:  Continuous Infusions:  Scheduled Meds:   acetaminophen  1,000 mg Oral Q8H    allopurinoL  300 mg Oral Daily    aspirin  81 mg Oral Daily    capsaicin   Topical (Top) TID    DULoxetine  20 mg Oral Daily    heparin (porcine)  5,000 Units Subcutaneous Q8H    insulin aspart U-100  18 Units Subcutaneous TIDWM    insulin glargine U-100  24 Units Subcutaneous BID    ipratropium-albuteroL  1 puff Inhalation Q6H WAKE    methocarbamoL  500 mg Oral TID    metoprolol succinate  100 mg Oral Daily    NIFEdipine  30 mg Oral BID    polyethylene glycol  17 g Oral BID    pregabalin  150 mg Oral BID    senna-docusate  1 tablet Oral BID    sertraline  100 mg Oral Daily     PRN Meds:  Current Facility-Administered Medications:     albuterol-ipratropium, 3 mL, Nebulization, Q4H PRN    dextrose 50%, 12.5 g, Intravenous, PRN    dextrose 50%, 25 g, Intravenous, PRN    glucagon (human recombinant), 1 mg, Intramuscular, PRN    glucose, 16 g, Oral, PRN    glucose, 24 g, Oral, PRN    hydrALAZINE, 10 mg, Intravenous, Q6H PRN    HYDROcodone-acetaminophen, 1 tablet, Oral, Q4H PRN    hydrOXYzine HCL, 25 mg, Oral, Q4H PRN    insulin aspart U-100, 0-10 Units, Subcutaneous, QID (AC + HS) PRN    meclizine, 25 mg, Oral, TID PRN    melatonin, 6 mg, Oral, Nightly PRN    ondansetron, 8 mg, Oral, Q6H PRN    ondansetron, 8 mg, Intravenous, Q6H PRN    sodium chloride 0.9%, 10 mL, Intravenous, " PRN    Objective:     Vital Signs (Most Recent):  Temp: 97.5 °F (36.4 °C) (07/22/25 1131)  Pulse: 73 (07/22/25 1131)  Resp: 16 (07/22/25 1131)  BP: (!) 167/71 (07/22/25 1131)  SpO2: 97 % (07/22/25 1131) Vital Signs (24h Range):  Temp:  [97.5 °F (36.4 °C)-98.3 °F (36.8 °C)] 97.5 °F (36.4 °C)  Pulse:  [70-79] 73  Resp:  [16-20] 16  SpO2:  [96 %-99 %] 97 %  BP: (123-167)/(56-71) 167/71     Weight: 94 kg (207 lb 3.7 oz)  Body mass index is 40.47 kg/m².       Physical Exam  Vitals and nursing note reviewed.   Constitutional:       General: She is not in acute distress.     Comments: Chronically ill appearing   HENT:      Head: Normocephalic and atraumatic.   Eyes:      Extraocular Movements: Extraocular movements intact.   Pulmonary:      Comments: NC in place  Neurological:      Mental Status: She is alert.      Comments: Awake, alert, conversant. Good insight            Review of Symptoms        Living Arrangements:  Lives with family    Psychosocial/Cultural:   See Palliative Psychosocial Note: Yes  - lives at home with her daughter Esha  - used to work at [employer] and then [employer]. Retired around 15 years ago   - enjoys watching tv and spending time with her daughter Esha  **Primary  to Follow**  Palliative Care  Consult: No        Advance Care Planning  Advance Directives:   Medical Power of : Yes      Decision Making:  Patient answered questions  Goals of Care: The patient endorses that what is most important right now is to focus on improvement of condition.     Accordingly, we have decided that the best plan to meet the patient's goals includes continuing with treatment         Significant Labs: reviewed  CBC:   Recent Labs   Lab 07/22/25  0346   WBC 9.81   HGB 9.7*   HCT 30.3*   MCV 86        BMP:  Recent Labs   Lab 07/22/25  0346   *      K 4.9      CO2 25   BUN 26*   CREATININE 1.3   CALCIUM 8.8   MG 2.0     LFT:  Lab Results   Component Value  Date    AST 17 07/17/2025    ALKPHOS 54 07/17/2025    BILITOT 0.2 07/17/2025     Albumin:   Albumin   Date Value Ref Range Status   07/17/2025 3.4 (L) 3.5 - 5.2 g/dL Final   03/06/2025 4.0 3.5 - 5.2 g/dL Final     Protein:   Protein Total   Date Value Ref Range Status   07/17/2025 7.0 6.0 - 8.4 gm/dL Final     Total Protein   Date Value Ref Range Status   03/06/2025 8.2 6.0 - 8.4 g/dL Final     Lactic acid:   Lab Results   Component Value Date    LACTATE 1.0 06/23/2024    LACTATE 0.9 06/23/2024       Significant Imaging: reviewed    35 minutes face to face time in discussion of symptom assessment, coordination of care, exploring burden/ benefit of various approaches of treatment and discharge planning.  This also includes non-face to face time preparing to see the patient (eg, review of tests/imaging), obtaining and/or reviewing separately obtained history, documenting clinical information in the electronic or other health record, independently interpreting results and communicating results to the patient/family/caregiver, or care coordinator.     16 minutes ACP time spent: goals of care, emotional support, formulating goals and communication of prognosis      Errol Pedersen MD  Palliative Medicine  [facility] ([address])

## 2025-07-22 NOTE — PLAN OF CARE
Problem: Adult Inpatient Plan of Care  Goal: Plan of Care Review  Outcome: Met  Goal: Patient-Specific Goal (Individualized)  Outcome: Met  Goal: Absence of Hospital-Acquired Illness or Injury  Outcome: Met  Goal: Optimal Comfort and Wellbeing  Outcome: Met  Goal: Readiness for Transition of Care  Outcome: Met     Problem: Infection  Goal: Absence of Infection Signs and Symptoms  Outcome: Met     Problem: Diabetes Comorbidity  Goal: Blood Glucose Level Within Targeted Range  Outcome: Met     Problem: Bariatric Environmental Safety  Goal: Safety Maintained with Care  Outcome: Met     Problem: Skin Injury Risk Increased  Goal: Skin Health and Integrity  Outcome: Met     Problem: Anxiety Signs/Symptoms  Goal: Optimized Energy Level (Anxiety Signs/Symptoms)  Outcome: Met  Goal: Optimized Cognitive Function (Anxiety Signs/Symptoms)  Outcome: Met  Goal: Improved Mood Symptoms (Anxiety Signs/Symptoms)  Outcome: Met  Goal: Improved Sleep (Anxiety Signs/Symptoms)  Outcome: Met  Goal: Enhanced Social, Occupational or Functional Skills (Anxiety Signs/Symptoms)  Outcome: Met  Goal: Improved Somatic Symptoms (Anxiety Signs/Symptoms)  Outcome: Met     Problem: Coping Ineffective  Goal: Effective Coping  Outcome: Met     Problem: Fall Injury Risk  Goal: Absence of Fall and Fall-Related Injury  Outcome: Met     Problem: Hospitalized Older Adult  Goal: Optimal Cognitive Function  Outcome: Met  Goal: Effective Bowel Elimination  Outcome: Met  Goal: Optimal Coping  Outcome: Met  Goal: Fluid and Electrolyte Balance  Outcome: Met  Goal: Optimal Functional Ability  Outcome: Met  Goal: Improved Oral Intake  Outcome: Met  Goal: Adequate Sleep/Rest  Outcome: Met  Goal: Effective Urinary Elimination  Outcome: Met

## 2025-07-22 NOTE — DISCHARGE SUMMARY
[facility] [address] Medicine  Discharge Summary      Patient Name: Indira Waters  MRN: [id number]  DIONE: [id number]  Patient Class: IP- Inpatient  Admission Date: 7/16/2025  Hospital Length of Stay: 5 days  Discharge Date and Time: 7/22/2025  2:48 PM  Attending Physician: Nu Abernathy MD  Discharging Provider: Nu Abernathy MD  Primary Care Provider: Chun Zapata MD    Primary Care Team: Networked reference to record PCT     HPI:   Ms. Waters is a 80 YOF with PMHx of HFrEF (EF 40-50% 06/2024), CAD ([facility] 05/2022 with severe 2 vessel, predominantly distal, disease), aortic atherosclerosis with tortuous aorta, HTN, HLD, DM type II, diabetic polyneuropathy,COPD, chronic respiratory insuffiencey/chronic airflow limitation on home oxygen qHS , former smoker, CKD III (baseline SCr 1.1-1.2), chronic anemia, history of CVA with residual left-sided deficits, GERD, fibromyalgia, chronic pain, osteoarthritis, anxiety/depression, and obesity.     She presents to ED with her daughter due to concerns for weakness, fatigue, shortness of breath, palpitations, chest discomfort, light headiness, dizziness, headaches, dysuria, nausea, vomiting, abdominal discomfort, chronic back pain, fever, chills, cough noting I just do not feel good. She denies diarrhea, constipation, hematuria, decreased UOP, blood in stool, seizures, recent falls, or syncope. She lives with her daughter, uses ambualtory aides as needed, and requires some assistance with ADLs.     In the ED she is hypertensive otherwise HDS and afebrile.  CBC with WBC 13, H/H 10.7/33, platelets 319.  Chemistry was , K 4.7, chloride 101, CO2 22, BUN 31, SCr 1.4, glucose 205.  Magnesium 2.0.  LFTs unremarkable.  .  Troponin 0.009.  TSH 1.551.  Hepatitis-C and HIV nonreactive.  UA does not appear infectious.  CXR with cardiomegaly. CT A/P with normal appearance of the kidneys, ureters and bladder; mild pancreatic atrophy.     The  patient was placed in observation to the Hospital Medicine Service for further evaluation and treatment.     * No surgery found *      Hospital Course:   Admitted with generalized weakness and multiple other symptoms. Pain control adjusted, imaging performed of abdomen and medications adjusted for improved symptom control. Lab and imaging evaluation unremarkable for acute abnormality. Therapy consulted and recommended low intensity. With multitude of symptoms, Palliative consulted for symptom management assistance and referral placed for home-based palliative. Discharge delayed slightly by lack of power at home preventing access to home O2. Discussed with PCP (Dr. Zapata) by Dr. Perez; would prefer to treat for E faecalis noted on urine culture though UA unremarkable. Treatment options limited by allergies; patient/family recall use of amoxicillin at some point but unable to locate in record, ordered for fosfomycin 3g x 1 on DC. Arranged for home health with Palliative care and follow up with her PCP. Also confirmed by case management that housing with electricity was confirmed with the landlord prior to discharge.     Goals of Care Treatment Preferences:  Code Status: Full Code         Consults:   Consults (From admission, onward)          Status Ordering Provider     Inpatient consult to Palliative Care  Once        Provider:  Errol Pedersen MD    Completed HEAVENLY PEREZ            Final Active Diagnoses:    Diagnosis Date Noted POA    PRINCIPAL PROBLEM:  General weakness [R53.1] 07/16/2025 Yes    Chronic systolic (congestive) heart failure [I50.22] 07/18/2025 Yes    Goals of care, counseling/discussion [Z71.89] 07/18/2025 Not Applicable    Nausea and vomiting [R11.2] 07/16/2025 Yes    Dysuria [R30.0] 07/16/2025 Yes    On home oxygen therapy [Z99.81] 03/31/2025 Not Applicable    Chronic respiratory failure with hypoxia [J96.11] 07/19/2024 Yes    Chronic bronchitis [J42] 07/19/2024 Yes    COPD (chronic  "obstructive pulmonary disease) [J44.9] 07/19/2024 Yes    History of CVA (cerebrovascular accident) [Z86.73] 07/19/2024 Not Applicable    Recurrent major depressive disorder, in remission [F33.40] 06/20/2022 Yes    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 pectoris [I25.118] 05/04/2022 Yes    Depression, unspecified [F32.A] 11/10/2021 Yes    Chronic combined systolic and diastolic congestive heart failure [I50.42] 09/15/2021 Yes    Hemiplegia and hemiparesis following cerebral infarction affecting left non-dominant side [I69.354] 09/15/2021 Not Applicable    Athscl heart disease of native coronary artery w/o ang pctrs [I25.10] 09/15/2021 Yes    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 disease [E11.22] 09/15/2021 Yes    Stage 3 chronic kidney disease [N18.30] 09/15/2021 Yes    Generalized anxiety disorder [F41.1] 09/15/2021 Yes    Essential hypertension [I10] 09/10/2021 Yes     Chronic    Former smoker [Z87.891] 06/11/2021 Not Applicable    Chronic pain syndrome [G89.4] 05/22/2014 Yes    CAFL (chronic airflow limitation) [J44.9] 04/29/2014 Yes    Gastroesophageal reflux disease without esophagitis [K21.9] 04/29/2014 Yes     Chronic    Fibromyalgia [M79.7] 04/29/2014 Yes     Chronic      Problems Resolved During this Admission:       Discharged Condition: good    Disposition[place] [facility]    [place] Up:   Follow-up Information       [facility] Follow up.    Specialties: Hospice, Home Hospice, Inpatient Hospice  Contact information:  [address]  [address]  [facility] [address]  [phone]             [facility] Follow up.    Specialty: Home Health Services  Contact information:  [address]  [address]  [place] [address]  [phone]                           Patient Instructions:      WALKER FOR HOME USE     Order Specific Question Answer Comments   Type of Walker: Az (4'4"-5'6")    With " wheels? Yes    Height: 5' (1.524 m)    Weight: 94 kg (207 lb 3.7 oz)    Length of need (1-99 months): 99    Does patient have medical equipment at home? bedside commode    Does patient have medical equipment at home? cane, straight    Does patient have medical equipment at home? shower chair    Does patient have medical equipment at home? oxygen    Please check all that apply: Patient's condition impairs ambulation.    Please check all that apply: Patient needs help to get in and out of chair.    Please check all that apply: Walker will be used for gait training.    Please check all that apply: Patient is unable to safely ambulate without equipment.      Ambulatory referral/consult to Palliative Care   Standing Status: Future   Referral Priority: Routine Referral Type: Consultation   Requested Specialty: Palliative Medicine   Number of Visits Requested: 1     Diet diabetic     Diet Cardiac     Activity as tolerated       Significant Diagnostic Studies: N/A    Pending Diagnostic Studies:       None           Medications:  Reconciled Home Medications:      Medication List        PAUSE taking these medications      losartan 50 MG tablet  Wait to take this until your doctor or other care provider tells you to start again.  Commonly known as: COZAAR  Take 1 tablet (50 mg total) by mouth once daily.     torsemide 20 MG Tab  Wait to take this until your doctor or other care provider tells you to start again.  Commonly known as: DEMADEX  TAKE ONE TABLET BY MOUTH ONCE DAILY            START taking these medications      capsaicin 0.025 % cream  Commonly known as: ZOSTRIX  Apply topically 3 (three) times daily.     DULoxetine 20 MG capsule  Commonly known as: CYMBALTA  Take 1 capsule (20 mg total) by mouth once daily.     meclizine 25 mg tablet  Commonly known as: ANTIVERT  Take 1 tablet (25 mg total) by mouth 3 (three) times daily as needed for Dizziness.            CONTINUE taking these medications      acetaminophen 500 MG  "tablet  Commonly known as: TYLENOL  Take 2 tablets (1,000 mg total) by mouth every 8 (eight) hours as needed for Pain.     albuterol 1.25 mg/3 mL Nebu  Commonly known as: ACCUNEB  Take 3 mLs (1.25 mg total) by nebulization every 6 (six) hours as needed (for wheezing or shortness of breath). Rescue     allopurinoL 300 MG tablet  Commonly known as: ZYLOPRIM  Take 1 tablet (300 mg total) by mouth once daily.     aspirin 81 MG EC tablet  Commonly known as: ECOTRIN  Take 1 tablet (81 mg total) by mouth once daily.     BAQSIMI 3 mg/actuation Spry  Generic drug: glucagon  3 mg by Nasal route as needed (Severe Hypoglycemia).     BD ULTRA-FINE MICRO PEN NEEDLE 32 gauge x 1/4" Ndle  Generic drug: pen needle, diabetic  Use with insulin 5 times a day.     blood sugar diagnostic Strp  To check BG 4 times daily, to use with insurance preferred meter     blood-glucose meter kit  To check BG 4 times daily, to use with insurance preferred meter     CombiVENT RESPIMAT  mcg/actuation inhaler  Generic drug: ipratropium-albuteroL  Inhale 1 puff into the lungs every 6 (six) hours.     fenofibrate micronized 134 MG Cap  Commonly known as: LOFIBRA  Take 1 capsule (134 mg total) by mouth daily with breakfast.     HYDROcodone-acetaminophen  mg per tablet  Commonly known as: NORCO  Take 1 tablet by mouth every 8 (eight) hours as needed for Pain.     insulin lispro 100 unit/mL pen  Commonly known as: HumaLOG KwikPen Insulin  Inject 15 Units into the skin before breakfast AND 15 Units daily with lunch AND 11 Units daily with dinner or evening meal. Plus correction scale. Max TDD 50units..     lancets Misc  To check BG 4 times daily, to use with insurance preferred meter     LANTUS SOLOSTAR U-100 INSULIN 100 unit/mL (3 mL) Inpn pen  Generic drug: insulin glargine U-100 (Lantus)  Inject 26 Units into the skin 2 (two) times a day.     melatonin 3 mg tablet  Commonly known as: MELATIN  Take 2 tablets (6 mg total) by mouth nightly as " needed for Insomnia.     methocarbamoL 500 MG Tab  Commonly known as: Robaxin  Take 1 tablet (500 mg total) by mouth 3 (three) times daily.     metoclopramide HCl 5 MG tablet  Commonly known as: REGLAN  TAKE ONE TABLET BY MOUTH FOUR TIMES DAILY AS NEEDED FOR nausea prevention     metoprolol succinate 100 MG 24 hr tablet  Commonly known as: TOPROL-XL  Take 1 tablet (100 mg total) by mouth once daily.     NIFEdipine 30 MG (OSM) 24 hr tablet  Commonly known as: PROCARDIA-XL  TAKE ONE TABLET BY MOUTH TWICE DAILY     nitroGLYCERIN 0.4 MG SL tablet  Commonly known as: NITROSTAT  DISSOLVE 1 TABLET UNDER THE TONGUE EVERY 5 MINUTES AS NEEDED FOR CHEST PAIN. DO NOT EXCEED A TOTAL OF 3 DOSES IN 15 MINUTES.     * nystatin cream  Commonly known as: MYCOSTATIN  Apply topically 2 (two) times daily. Underneath Alexus's buttpaste for the rash     * nystatin powder  Commonly known as: MYCOSTATIN  Apply topically 4 (four) times daily.     pregabalin 150 MG capsule  Commonly known as: LYRICA  Take 1 capsule (150 mg total) by mouth 2 (two) times daily.     REPATHA SURECLICK 140 mg/mL Pnij  Generic drug: evolocumab  Inject 1 mL (140 mg total) into the skin every 14 (fourteen) days.     sertraline 100 MG tablet  Commonly known as: ZOLOFT  TAKE ONE TABLET BY MOUTH EVERY DAY     STOOL SOFTENER-STIMULANT LAXAT 8.6-50 mg per tablet  Generic drug: senna-docusate  Take 1 tablet by mouth 2 (two) times daily as needed for Constipation.           * This list has 2 medication(s) that are the same as other medications prescribed for you. Read the directions carefully, and ask your doctor or other care provider to review them with you.                ASK your doctor about these medications      fosfomycin 3 gram Pack  Commonly known as: MONUROL  Take 3 g by mouth once. for 1 dose  Ask about: Should I take this medication?              Indwelling Lines/Drains at time of discharge:   Lines/Drains/Airways       None                       Time spent  on the discharge of patient: 35 minutes         Nu Abernathy MD  Department of Hospital Medicine  [facility] ([address])

## 2025-07-22 NOTE — ASSESSMENT & PLAN NOTE
- noted hx  - noted on sertraline which patient doesn't feel has been overly effective-  - has had some issues with decreased motivation, but denies feeling depressed  [] continue duloxetine 20mg daily, with plan for continued outpatient uptitration of duloxetine overtime and future de-escalation of sertraline. They are aware of need for monitoring how she does while on both of them. Can be further managed by pcp/pain management. Duloxetine can help vs fibromyalgia and mood stabilization.

## 2025-07-22 NOTE — ASSESSMENT & PLAN NOTE
- noted hx  - follows closely with pain management, next appt early 8/2025  - noted on lyrica, methocarbamol, norco   - management of chronic pain/fibromyalgia is per pain management  - noted used to follow with rheumatology, with last seen 2022 when they recommended consideration for duloxetine for her fibromyalgia/chronic pain syndrome  - patient plans for re-establishment with rheumatology too  [] she is aware of importance of exercise/activity as possible  [] continue duloxetine 20mg daily, with plan, as preferred by patient, for continued outpatient uptitration of duloxetine overtime and future de-escalation of sertraline, as duloxetine increased.. Can be further managed by pcp/pain management.

## 2025-07-22 NOTE — PROGRESS NOTES
[facility] ([address] Medicine  Progress Note    Patient Name: Indira Waters  MRN: [id number]  Patient Class: IP- Inpatient   Admission Date: 7/16/2025  Length of Stay: 4 days  Attending Physician: HEAVENLY Perez MD  Primary Care Provider: Chun Zapata MD        Subjective     Principal Problem:General weakness        HPI:  Ms. Waters is a 80 YOF with PMHx of HFrEF (EF 40-50% 06/2024), CAD ([facility] 05/2022 with severe 2 vessel, predominantly distal, disease), aortic atherosclerosis with tortuous aorta, HTN, HLD, DM type II, diabetic polyneuropathy,COPD, chronic respiratory insuffiencey/chronic airflow limitation on home oxygen qHS , former smoker, CKD III (baseline SCr 1.1-1.2), chronic anemia, history of CVA with residual left-sided deficits, GERD, fibromyalgia, chronic pain, osteoarthritis, anxiety/depression, and obesity.     She presents to ED with her daughter due to concerns for weakness, fatigue, shortness of breath, palpitations, chest discomfort, light headiness, dizziness, headaches, dysuria, nausea, vomiting, abdominal discomfort, chronic back pain, fever, chills, cough noting I just do not feel good. She denies diarrhea, constipation, hematuria, decreased UOP, blood in stool, seizures, recent falls, or syncope. She lives with her daughter, uses ambualtory aides as needed, and requires some assistance with ADLs.     In the ED she is hypertensive otherwise HDS and afebrile.  CBC with WBC 13, H/H 10.7/33, platelets 319.  Chemistry was , K 4.7, chloride 101, CO2 22, BUN 31, SCr 1.4, glucose 205.  Magnesium 2.0.  LFTs unremarkable.  .  Troponin 0.009.  TSH 1.551.  Hepatitis-C and HIV nonreactive.  UA does not appear infectious.  CXR with cardiomegaly. CT A/P with normal appearance of the kidneys, ureters and bladder; mild pancreatic atrophy.     The patient was placed in observation to the Hospital Medicine Service for further evaluation and treatment.      Overview/Hospital Course:  Admitted with generalized weakness and multiple other symptoms. Pain control adjusted, imaging performed of abdomen and medications adjusted for improved symptom control. Lab and imaging evaluation unremarkable for acute abnormality. Therapy consulted and recommended low intensity. With multitude of symptoms palliative consulted for symptom management assistance and referral placed for home-based palliative. Discharge delayed slightly by lack of power at home preventing access to home O2. Discussed with PCP, Dr. Zapata; would prefer to treat for E faecalis noted on urine culture though UA unremarkable. Treatment options limited by allergies; patient/family recall use of amoxicillin at some point but unable to locate in record, plan for fosfomycin on DC.    Interval History: No acute events overnight. Some abdominal discomfort; overall pain at a comparable but relatively controlled level.    Review of Systems   Constitutional:  Positive for fatigue. Negative for chills and fever.   Respiratory:  Negative for cough and shortness of breath.    Cardiovascular:  Negative for chest pain and palpitations.   Gastrointestinal:  Positive for abdominal pain and nausea. Negative for vomiting.   Musculoskeletal:  Positive for arthralgias and myalgias.     Objective:     Vital Signs (Most Recent):  Temp: 98.3 °F (36.8 °C) (07/21/25 2338)  Pulse: 78 (07/21/25 2338)  Resp: 20 (07/21/25 2338)  BP: (!) 124/59 (07/21/25 2338)  SpO2: 97 % (07/21/25 2338) Vital Signs (24h Range):  Temp:  [97.3 °F (36.3 °C)-98.3 °F (36.8 °C)] 98.3 °F (36.8 °C)  Pulse:  [71-79] 78  Resp:  [16-20] 20  SpO2:  [96 %-99 %] 97 %  BP: (123-142)/(56-66) 124/59     Weight: 94 kg (207 lb 3.7 oz)  Body mass index is 40.47 kg/m².    Intake/Output Summary (Last 24 hours) at 7/21/2025 3967  Last data filed at 7/21/2025 1632  Gross per 24 hour   Intake 420 ml   Output 850 ml   Net -430 ml         Physical Exam  Vitals and nursing note  reviewed.   Constitutional:       General: She is not in acute distress.     Appearance: She is well-developed. She is ill-appearing (chronically).   HENT:      Head: Normocephalic and atraumatic.   Eyes:      General:         Right eye: No discharge.         Left eye: No discharge.      Conjunctiva/sclera: Conjunctivae normal.   Cardiovascular:      Rate and Rhythm: Normal rate.      Pulses: Normal pulses.   Pulmonary:      Effort: Pulmonary effort is normal. No respiratory distress.   Abdominal:      Palpations: Abdomen is soft.      Tenderness: There is abdominal tenderness (mild, RLQ).   Musculoskeletal:         General: Normal range of motion.      Right lower leg: Edema present.      Left lower leg: Edema present.   Skin:     General: Skin is warm and dry.   Neurological:      Mental Status: She is alert and oriented to person, place, and time.           Significant Labs:   CBC:  Recent Labs   Lab 07/19/25 0316 07/20/25  0507 07/21/25  0429   WBC 11.02 11.41 10.87   HGB 9.9* 10.1* 9.5*   HCT 31.5* 31.6* 29.8*    292 293   NEUTROAUTO 56.1 55.6 59.4   LYMPH 2.88  26.1 3.10  27.2 2.53  23.3   MONO 9.7  1.07* 8.6  0.98 8.5  0.92   EOS 7.2  0.79* 7.6  0.87* 7.7  0.84*   BMP:  Recent Labs   Lab 07/19/25 0316 07/20/25  0507 07/21/25  0429    134* 138   K 4.4 4.4 4.5    99 103   CO2 23 24 24   BUN 29* 31* 27*   CREATININE 1.4 1.5* 1.2   * 297* 177*   CALCIUM 8.7 9.0 8.9   MG 1.9 1.9 2.1   PHOS 4.3 4.0 4.2     Significant Imaging: I have reviewed and interpreted all pertinent imaging results/findings within the past 24 hours.      Assessment & Plan  General weakness  Chronic pain syndrome  Fibromyalgia  Depression, unspecified  Generalized anxiety disorder  Recurrent major depressive disorder, in remission  - Generalized fatigue, weakness, and multiple complaints involving multiple systems.  - PT/OT evaluations. Labs overall unrevealing for acute abnormality. Clinically appears  improved and in better spirits though reports significant symptoms remain. Monitor with repeat labs.  - Anticipate likely DC with home health / additional support at home to assist. Discussed with patient/family; power out at home so currently unable to use home O2 which will be a barrier to discharge. CM investigating/assisting.  - Multitude of symptoms remain prominent. Palliative consulted; appreciate assistance. Continue acetaminophen 1000mg PO q8hr, methocarbamol, hydrocodone-acetaminophen at 10-325mg PO q4hr PRN.  Dysuria  Nausea and vomiting  -at admission had complaints of dysuria with nausea and vomiting; UA without WBCs or RBCs.  - Outpatient urine culture collected but does not appear she had UA at that time. Discussed with Dr. Zapata; would prefer to treat. Options limited by allergies. Plan for fosfomycin as next best option given PCN, nitrofurantoin allergies.  - Reports abdominal symptoms persist. Lipase WNL; repeat CT Abd/Pelvis with contrast without acute intra-abdominal abnormality.  - Symptomatic management.  Chronic respiratory failure with hypoxia  CAFL (chronic airflow limitation)  Chronic bronchitis  On home oxygen therapy  COPD (chronic obstructive pulmonary disease)  Former smoker  - History of COPD with chronic resp failure on home O2.  - Continue PRN nebulizations, controller, incentive spirometry, supportive care.  Chronic combined systolic and diastolic congestive heart failure  Essential hypertension  - Chronic; appears stable.  - Continue metoprolol, nifedipine, torsemide, resume losartan at 25mg.  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 pectoris  Athscl heart disease of native coronary artery w/o ang [facility]  -[facility] 05/2022 detailed in HPI  -continue home ASA  -hold home fenofibrate and repatha for now >> resume at DC  -no statin due to allergies  -continue tele monitoring  -EKG PRN concerns     History of CVA (cerebrovascular accident)  Hemiplegia and  hemiparesis following cerebral infarction affecting left non-dominant side  -history of   -continue home ASA  -hold home fenofibrate and repatha for now >> resume at DC  -no statin due to allergies  -fall precautions     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 disease  - Chronic; variable control.  - HgbA1c 8.0.  - Continue insulin glargine at 24U subQ BID, insulin aspart at 18U subQ TIDWM, convert to moderate-dose sliding scale insulin aspart 1-10U subQ TIDWM PRN.  Gastroesophageal reflux disease without esophagitis  -chronic  -PRN supportive care as indicated  Stage 3 chronic kidney disease  -chronic; recent elevation, now improving.  - Monitor. Avoid nephrotoxins, renally dose medications.    VTE Risk Mitigation (From admission, onward)           Ordered     heparin (porcine) injection 5,000 Units  Every 8 hours         07/16/25 1824     IP VTE HIGH RISK PATIENT  Once         07/16/25 1824     Place sequential compression device  Until discontinued         07/16/25 1824                    Discharge Planning   KYLE: 7/22/2025     Code Status: Full Code   Medical Readiness for Discharge Date:   Discharge Plan A: Home with family        D William Perez MD  Department of Hospital Medicine   [facility] ([address])

## 2025-07-22 NOTE — HOSPITAL COURSE
Admitted with generalized weakness and multiple other symptoms. Pain control adjusted, imaging performed of abdomen and medications adjusted for improved symptom control. Lab and imaging evaluation unremarkable for acute abnormality. Therapy consulted and recommended low intensity. With multitude of symptoms, Palliative consulted for symptom management assistance and referral placed for home-based palliative. Discharge delayed slightly by lack of power at home preventing access to home O2. Discussed with PCP (Dr. Zapata) by Dr. Perez; would prefer to treat for E faecalis noted on urine culture though UA unremarkable. Treatment options limited by allergies; patient/family recall use of amoxicillin at some point but unable to locate in record, ordered for fosfomycin 3g x 1 on DC. Arranged for home health with Palliative care and follow up with her PCP. Also confirmed by case management that housing with electricity was confirmed with the landlord prior to discharge.

## 2025-07-22 NOTE — PLAN OF CARE
Case Management Final Discharge Note    Discharge Disposition: At Home HH/[facility] (Palliative Care)    New DME ordered / company name: Declined purchase of RW; not covered by insurance due to receiving rollator 9/21    Relevant SDOH / Transition of Care Barriers:  None    Person available to provide assistance at home when needed and their contact information:     Esha Severino (Daughter)  [phone] (Mobile)       Scheduled followup appointment: PCP    Referrals placed:     Transportation: Daughter Esha Severino (daughter)        Patient and family educated on discharge services and updated on DC plan. Bedside RN notified, patient clear to discharge from Case Management Perspective.      07/22/25 1318   Final Note   Assessment Type Final Discharge Note   Anticipated Discharge Disposition Home-Health   Hospital Resources/Appts/Education Provided Provided patient/caregiver with written discharge plan information;Appointments scheduled and added to AVS   Post-Acute Status   Post-Acute Authorization Home Health   Home Health Status Set-up Complete/Auth obtained   Discharge Delays None known at this time     [facility] ([address])  Discharge Final Note    Primary Care Provider: Chun Zapata MD    Expected Discharge Date: 7/22/2025    Final Discharge Note (most recent)       Final Note - 07/22/25 1318          Final Note    Assessment Type Final Discharge Note (P)      Anticipated Discharge Disposition Home-Health Care Svc (P)      Hospital Resources/Appts/Education Provided Provided patient/caregiver with written discharge plan information;Appointments scheduled and added to AVS (P)         Post-Acute Status    Post-Acute Authorization Home Health (P)      Home Health Status Set-up Complete/Auth obtained (P)      Discharge Delays None known at this time (P)                      Important Message from Medicare             Contact Info       HOSPICE [facility]  CON   Specialty: Hospice, Home Hospice, Inpatient Hospice    [address]  [address]  [place] [address]   Phone: [phone]       Next Steps: Follow up    [facility]   Specialty: Home Health Services    [address]  [address]  [place] [address]   Phone: [phone]       Next Steps: Follow up

## 2025-07-22 NOTE — PLAN OF CARE
ED to Hosp-Admission (Discharged) from 7/16/2025 in [facility] ([address])     7/17/2025 7/22/2025    [id number]   Medicare Message     Important Message from Medicare regarding Discharge Appeal Rights -- Explained to patient/caregiver; Signed/date by patient/caregiver   Date IMM was signed -- 7/22/2025   Time IMM was signed -- 3723

## 2025-07-22 NOTE — PLAN OF CARE
Met with patient/daughter Esha to review discharge recommendation of HH and is agreeable to plan    Patient/family provided list of facilities in-network with patient's payor plan. Providers that are owned, operated, or affiliated with [facility] are included on the list.     Notified that referral sent to below listed facilities from in-network list based on proximity to home/family support:     [facility] Home [facility]      If an additional preferred facility not listed above is identified, additional referral to be sent. If above facilities unable to accept, will send additional referrals to in-network providers.     07/22/25 1014   Post-Acute Status   Post-Acute Authorization Home Health   Home Health Status Referrals Sent   Patient choice form signed by patient/caregiver List with quality metrics by geographic area provided;List from CMS Compare;List from System Post-Acute Care   Discharge Delays (!) Post-Acute Set-up   Discharge Plan   Discharge Plan A Home Health   Discharge Plan B Home with family

## 2025-07-22 NOTE — NURSING
Patient is discharged. Telemetry removed. IV removed, tip intact. Discharge instructions given and understood.

## 2025-07-22 NOTE — PLAN OF CARE
CM met with patient and daughter Esha at bedside. [facility] to stay in while electrical issues are being prepared at their residence. MD notified patient housing secured and good for discharge today if medically cleared.

## 2025-07-22 NOTE — PT/OT/SLP PROGRESS
Occupational Therapy      Patient Name:  Indira Waters   MRN:  [id number]    Patient not seen today secondary to not feeling well.  Upon arrival to room at 10:00 pt resting in bed with daughter present in room.  Pt declined participation in therapy shaking her head with daughter reporting she has developed a cough and has not been feeling well . RN (Chloe) notified.  Will follow-up at next scheduled session.    BRENDA Austin  7/22/2025

## 2025-07-22 NOTE — ASSESSMENT & PLAN NOTE
-[facility] 05/2022 detailed in HPI  -continue home ASA  -hold home fenofibrate and repatha for now >> resume at DC  -no statin due to allergies  -continue tele monitoring  -EKG PRN concerns

## 2025-07-22 NOTE — ASSESSMENT & PLAN NOTE
- noted  - management per pain management who she follows with outpatient. Also plans to re-establish with rheumatology.   - chronic back/knee pain  - noted plan for steroid knee injection next pain management appointment  - pain overall stable

## 2025-07-26 DIAGNOSIS — F41.9 ANXIETY DISORDER, UNSPECIFIED TYPE: ICD-10-CM

## 2025-07-26 NOTE — TELEPHONE ENCOUNTER
No care due was identified.  [facility] Embedded Care Due Messages. Reference number: [id number].   7/26/2025 8:05:40 AM CDT

## 2025-07-27 DIAGNOSIS — L30.4 INTERTRIGO: ICD-10-CM

## 2025-07-28 ENCOUNTER — OUTPATIENT CASE MANAGEMENT (OUTPATIENT)
Dept: ADMINISTRATIVE | Facility: OTHER | Age: 80
End: 2025-07-28
Payer: MEDICARE

## 2025-07-28 NOTE — TELEPHONE ENCOUNTER
Refill Encounter    PCP Visits: Recent Visits  Date Type Provider Dept   07/16/25 Office Visit Chun Zapata MD [facility] Internal Medicine   04/15/25 Office Visit Chun Zapata MD [facility] Internal Medicine   03/06/25 Office Visit Chun Zapata MD [facility] Internal Medicine   08/23/24 Office Visit Chun Zapata MD [facility] Internal Medicine   Showing recent visits within past 360 days and meeting all other requirements  Future Appointments  Date Type Provider Dept   08/12/25 Appointment Chun Zapata MD [facility] Internal Medicine   Showing future appointments within next 720 days and meeting all other requirements      Last 3 Blood Pressure:   BP Readings from Last 3 Encounters:   07/22/25 (!) 167/71   07/01/25 (!) 125/57   04/15/25 (!) 123/56     Preferred Pharmacy:     [address] [address]  Phone: [phone] Fax: [phone]    Requested RX:  Requested Prescriptions     Pending Prescriptions Disp Refills    sertraline (ZOLOFT) 100 MG tablet [Pharmacy Med Name: sertraline 100 mg tablet] 90 tablet 3     Sig: TAKE ONE TABLET BY MOUTH EVERY DAY      RX Route: Normal

## 2025-07-28 NOTE — TELEPHONE ENCOUNTER
----- Message from  Madina sent at 7/28/2025 10:22 AM CDT -----  Regarding: Patient's cough and hospital follow up  Good morning,    I just spoke to this patient's daughter.  She was hospitalized at [facility] on 7/16-7/22 where they did not find any acute abnormalities in her labs or imaging.  She was discharged home with  a terrible cough.  Esha, the daughter, brought it to the doctor's attention which they responded saying her CXR was fine and did not prescribe her anything for the cough.  Esha states the coughing is making her chronic back pain worse as well as her chest and ribs are sore now.  She states she is coughing up very little and what she does cough up is clear.  She is using her inhaler TID, her nebulizer BID and her incentive spirometry but they are not helping.  Esha would like to know if you can please prescribe her something for cough.      Also, the hospitalist put her losartan and torsemide on hold until she sees you again and her blood pressures are running 142-149/63-64.  She is not having any signs or symptoms of CHF at this time besides the cough she has been having.  No swelling, shortness of breath or weight gain.    Also, she has a hospital follow up appointment with you but it is not scheduled until 10/12.  Is it possible for her to get in to see you or the NP sooner?  Please advise.         Thank you,  Paola Malhotra RN  RN Case Manager  Outpatient Care Management  [facility]  [phone]  [email or web address]  ----- Message -----  From: Madina Malhotra RN  Sent: 7/28/2025  10:31 AM CDT  To: Jodi Sheehan Staff  Subject: Patient's cough and hospital follow up           Good morning,    I just spoke to this patient's daughter.  She was hospitalized at [facility] on 7/16-7/22 where they did not find any acute abnormalities in her labs or imaging.  She was discharged home with  a terrible cough.  Esha, the daughter, brought it to  the doctor's attention which they responded saying her CXR was fine and did not prescribe her anything for the cough.  Esha states the coughing is making her chronic back pain worse as well as her chest and ribs are sore now.  She states she is coughing up very little and what she does cough up is clear.  She is using her inhaler TID, her nebulizer BID and her incentive spirometry but they are not helping.  Esha would like to know if you can please prescribe her something for cough.      Also, she has a hospital follow up appointment with you but it is not scheduled until 10/12.  Is it possible for her to get in to see you or the NP sooner?  Please advise.     Thank you,  Paola Malhotra RN  RN Case Manager  Outpatient Care Management  [facility]  [phone]  [email or web address]

## 2025-07-28 NOTE — PROGRESS NOTES
Outpatient Care Management  Plan of Care Follow Up Visit    Patient: Indira Waters  MRN: [id number]  Date of Service: 07/28/2025  Completed by: Maidna Malhotra RN  Referral Date: 03/31/2025    Reason for Visit   Patient presents with    OPCM RN Follow Up Call       Brief Summary:   MEMBER'S CURRENT STATUS  :  Patient was hospitalized at [facility] from 7/16-7/22 for weakness, fatigue, shortness of breath, palpitations, chest discomfort, light headedness, dizziness, headache, dysuria, nausea, vomiting, abdominal discomfort, chronic back pain, fever, chills, and a cough.  Testing was done and the labs and imaging showed no acute abnormalities.  She was sent home with [facility] and [facility].  The home health has resumed already but Esha has not heard from the [facility].      -S&S of CKD:  Patient's daughter, Esha, states that the only signs and symptoms of CKD she can think of is swelling.  She states, after the signs and symptoms of CKD reviewed by CM, that the patient does not have any of the symptoms of CKD at this time.  She states the only thing the patient has going on right now is a cough.  She states the doctor's at the hospital knew about the cough but did not prescribe her anything for it.  They only told her that the CXR was fine.  Esha states that the patient is coughing up very little but it is clear.  She states that the patient's ribs and chest very sore and her pain she went to the hospital with worse.  She states she gave her some Mucinex, inhalers , nebulizer, and incentive spirometry but they are not helping.  Esha agreed for CM to send a message to Dr. Zapata about the cough.      -Low sodium diet and fluids:  Esha states the patient is drinking 3301-8908 ml of fluids per day.  She is staying well hydrated.      -Comorbid risk factors:  Esha states that they are not having sensor problems anymore with the Dexcom.  She states she did  ask the Giuliana Joshua from [facility] how often she should calibrate the patient's Dexcom, Giuliana stated that she should calibrate it whenever it shows an out of normal range glucose.  Esha states the glucoses have been running 100-265.  She states her last two blood pressures were 142/63 and 149/64.  Esha states they stopped the patient losartan and torsemide in the hospital for her kidneys and not to resume them until she sees her PCP at the hospital follow up which is not until 10/12.  Her daily weights have been 192.8-196.5 not fluctuating more than a couple of pounds per day.      -S&S of Anemia:  Patient's daughter, Esha, states she does not remember the signs and symptoms of anemia at this time.         INTERVENTIONS  :  -S&S of CKD:  CM reviewed the signs and symptoms of CKD Esha did not mention including anorexia; fatigue; weakness; difficulty sleeping; confusion; frequent urination, especially at night; muscle cramps at night; periorbital edema, especially in the morning; nausea and vomiting; itchy skin (pruritus); weight gain or decreased urine output.  CM sent a message to Dr. Chun Zapata concerning the patient's cough and whether or not there is anything she can take to help herself.      -Low sodium diet and fluids:  CM encouraged Esha to continue to give the patient 3904-7025 ml of fluids daily to keep her kidneys well hydrated.    -Comorbid risk factors:  CM sent message to Dr. Chun Zapata concerning the patient's mildly elevated blood pressures, about the losartan and torsemide being on hold, and her hospital follow up appointment is not scheduled until 10/12.     -S&S of Anemia:  CM reiterated signs and symptoms of anemia including fatigue, Breathlessness, tachypnea, tachycardia, cold hands and feet, dizziness, headache, pale skin, brittle nails and craving unusual food.          Next steps:   Follow up on S&S of CKD  Follow up on S&S of Anemia  Educate that diet should include Vitamin C to  help with iron absorption  Follow up on fluid restriction 2805-3867 ml  Follow up on what BP and BS running  Follow up on plate method  Follow up if patient taking losartan and torsemide

## 2025-07-28 NOTE — TELEPHONE ENCOUNTER
Refill Encounter    PCP Visits: Recent Visits  Date Type Provider Dept   07/16/25 Office Visit Chun Zapata MD [facility] Internal Medicine   04/15/25 Office Visit Chun Zapata MD [facility] Internal Medicine   03/06/25 Office Visit Chun Zapata MD [facility] Internal Medicine   08/23/24 Office Visit Chun Zapata MD [facility] Internal Medicine   Showing recent visits within past 360 days and meeting all other requirements  Future Appointments  Date Type Provider Dept   08/12/25 Appointment Chun Zapata MD [facility] Internal Medicine   Showing future appointments within next 720 days and meeting all other requirements      Last 3 Blood Pressure:   BP Readings from Last 3 Encounters:   07/22/25 (!) 167/71   07/01/25 (!) 125/57   04/15/25 (!) 123/56     Preferred Pharmacy:     [address] [address]  Phone: [phone] Fax: [phone]      Requested RX:  Requested Prescriptions     Pending Prescriptions Disp Refills    NYSTOP powder [Pharmacy Med Name: Nystop 100,000 unit/gram topical powder] 60 g 2     Sig: APPLY TO THE AFFECTED AREA(S) FOUR TIMES DAILY      RX Route: Normal

## 2025-07-29 ENCOUNTER — OFFICE VISIT (OUTPATIENT)
Dept: OPTOMETRY | Facility: CLINIC | Age: 80
End: 2025-07-29
Payer: MEDICARE

## 2025-07-29 ENCOUNTER — TELEPHONE (OUTPATIENT)
Dept: ENDOCRINOLOGY | Facility: CLINIC | Age: 80
End: 2025-07-29
Payer: MEDICARE

## 2025-07-29 DIAGNOSIS — E11.9 DIABETES MELLITUS TYPE 2 WITHOUT RETINOPATHY: Primary | ICD-10-CM

## 2025-07-29 DIAGNOSIS — E11.22 TYPE 2 DIABETES MELLITUS WITH CHRONIC KIDNEY DISEASE, WITH LONG-TERM CURRENT USE OF INSULIN, UNSPECIFIED CKD STAGE: ICD-10-CM

## 2025-07-29 DIAGNOSIS — Z79.4 TYPE 2 DIABETES MELLITUS WITH CHRONIC KIDNEY DISEASE, WITH LONG-TERM CURRENT USE OF INSULIN, UNSPECIFIED CKD STAGE: ICD-10-CM

## 2025-07-29 DIAGNOSIS — H25.813 COMBINED FORM OF SENILE CATARACT OF BOTH EYES: ICD-10-CM

## 2025-07-29 PROCEDURE — 99999 PR PBB SHADOW E&M-EST. PATIENT-LVL III: CPT | Mod: PBBFAC,,, | Performed by: OPTOMETRIST

## 2025-07-29 PROCEDURE — 99213 OFFICE O/P EST LOW 20 MIN: CPT | Mod: PBBFAC | Performed by: OPTOMETRIST

## 2025-07-29 PROCEDURE — 99214 OFFICE O/P EST MOD 30 MIN: CPT | Mod: S$PBB,,, | Performed by: OPTOMETRIST

## 2025-07-29 RX ORDER — SERTRALINE HYDROCHLORIDE 100 MG/1
100 TABLET, FILM COATED ORAL
Qty: 90 TABLET | Refills: 0 | Status: SHIPPED | OUTPATIENT
Start: 2025-07-29

## 2025-07-29 RX ORDER — NYSTATIN 100000 [USP'U]/G
POWDER TOPICAL
Qty: 60 G | Refills: 2 | Status: SHIPPED | OUTPATIENT
Start: 2025-07-29

## 2025-07-30 ENCOUNTER — PATIENT MESSAGE (OUTPATIENT)
Dept: ENDOCRINOLOGY | Facility: CLINIC | Age: 80
End: 2025-07-30
Payer: MEDICARE

## 2025-07-30 ENCOUNTER — OFFICE VISIT (OUTPATIENT)
Dept: HEMATOLOGY/ONCOLOGY | Facility: CLINIC | Age: 80
End: 2025-07-30
Payer: MEDICARE

## 2025-07-30 ENCOUNTER — LAB VISIT (OUTPATIENT)
Dept: LAB | Facility: OTHER | Age: 80
End: 2025-07-30
Attending: STUDENT IN AN ORGANIZED HEALTH CARE EDUCATION/TRAINING PROGRAM
Payer: MEDICARE

## 2025-07-30 VITALS
TEMPERATURE: 98 F | DIASTOLIC BLOOD PRESSURE: 68 MMHG | BODY MASS INDEX: 39.3 KG/M2 | HEART RATE: 76 BPM | WEIGHT: 200.19 LBS | HEIGHT: 60 IN | SYSTOLIC BLOOD PRESSURE: 140 MMHG | OXYGEN SATURATION: 95 % | RESPIRATION RATE: 14 BRPM

## 2025-07-30 DIAGNOSIS — D64.9 ANEMIA, UNSPECIFIED TYPE: ICD-10-CM

## 2025-07-30 DIAGNOSIS — M17.0 PRIMARY OSTEOARTHRITIS OF BOTH KNEES: ICD-10-CM

## 2025-07-30 DIAGNOSIS — E11.40 PAINFUL DIABETIC NEUROPATHY: ICD-10-CM

## 2025-07-30 DIAGNOSIS — G89.4 CHRONIC PAIN DISORDER: ICD-10-CM

## 2025-07-30 DIAGNOSIS — M54.16 LUMBAR RADICULOPATHY, CHRONIC: ICD-10-CM

## 2025-07-30 LAB
ABSOLUTE EOSINOPHIL (OHS): 0.58 K/UL
ABSOLUTE MONOCYTE (OHS): 1.01 K/UL (ref 0.3–1)
ABSOLUTE NEUTROPHIL COUNT (OHS): 8.77 K/UL (ref 1.8–7.7)
ALBUMIN SERPL BCP-MCNC: 3.7 G/DL (ref 3.5–5.2)
ALP SERPL-CCNC: 75 UNIT/L (ref 40–150)
ALT SERPL W/O P-5'-P-CCNC: 17 UNIT/L (ref 10–44)
ANION GAP (OHS): 8 MMOL/L (ref 8–16)
AST SERPL-CCNC: 26 UNIT/L (ref 11–45)
BASOPHILS # BLD AUTO: 0.1 K/UL
BASOPHILS NFR BLD AUTO: 0.8 %
BILIRUB SERPL-MCNC: 0.2 MG/DL (ref 0.1–1)
BUN SERPL-MCNC: 18 MG/DL (ref 8–23)
CALCIUM SERPL-MCNC: 8.7 MG/DL (ref 8.7–10.5)
CHLORIDE SERPL-SCNC: 102 MMOL/L (ref 95–110)
CO2 SERPL-SCNC: 25 MMOL/L (ref 23–29)
CREAT SERPL-MCNC: 1.1 MG/DL (ref 0.5–1.4)
ERYTHROCYTE [DISTWIDTH] IN BLOOD BY AUTOMATED COUNT: 16.5 % (ref 11.5–14.5)
ERYTHROCYTE [SEDIMENTATION RATE] IN BLOOD BY PHOTOMETRIC METHOD: 34 MM/HR
FERRITIN SERPL-MCNC: 48 NG/ML (ref 20–300)
GFR SERPLBLD CREATININE-BSD FMLA CKD-EPI: 51 ML/MIN/1.73/M2
GLUCOSE SERPL-MCNC: 149 MG/DL (ref 70–110)
HCT VFR BLD AUTO: 30.6 % (ref 37–48.5)
HGB BLD-MCNC: 10 GM/DL (ref 12–16)
IMM GRANULOCYTES # BLD AUTO: 0.1 K/UL (ref 0–0.04)
IMM GRANULOCYTES NFR BLD AUTO: 0.8 % (ref 0–0.5)
IRON SATN MFR SERPL: 8 % (ref 20–50)
IRON SERPL-MCNC: 45 UG/DL (ref 30–160)
LDH SERPL-CCNC: 211 U/L (ref 110–260)
LYMPHOCYTES # BLD AUTO: 2.73 K/UL (ref 1–4.8)
MCH RBC QN AUTO: 27.9 PG (ref 27–31)
MCHC RBC AUTO-ENTMCNC: 32.7 G/DL (ref 32–36)
MCV RBC AUTO: 85 FL (ref 82–98)
NUCLEATED RBC (/100WBC) (OHS): 0 /100 WBC
PLATELET # BLD AUTO: 318 K/UL (ref 150–450)
PMV BLD AUTO: 10.9 FL (ref 9.2–12.9)
POTASSIUM SERPL-SCNC: 4.9 MMOL/L (ref 3.5–5.1)
PROT SERPL-MCNC: 6.7 GM/DL (ref 6–8.4)
RBC # BLD AUTO: 3.59 M/UL (ref 4–5.4)
RELATIVE EOSINOPHIL (OHS): 4.4 %
RELATIVE LYMPHOCYTE (OHS): 20.5 % (ref 18–48)
RELATIVE MONOCYTE (OHS): 7.6 % (ref 4–15)
RELATIVE NEUTROPHIL (OHS): 65.9 % (ref 38–73)
RETICS/RBC NFR AUTO: 2.4 % (ref 0.5–2.5)
SODIUM SERPL-SCNC: 135 MMOL/L (ref 136–145)
TIBC SERPL-MCNC: 537 UG/DL (ref 250–450)
TRANSFERRIN SERPL-MCNC: 363 MG/DL (ref 200–375)
TSH SERPL-ACNC: 2.43 UIU/ML (ref 0.4–4)
WBC # BLD AUTO: 13.29 K/UL (ref 3.9–12.7)

## 2025-07-30 PROCEDURE — 99999 PR PBB SHADOW E&M-EST. PATIENT-LVL V: CPT | Mod: PBBFAC,,, | Performed by: INTERNAL MEDICINE

## 2025-07-30 PROCEDURE — 85025 COMPLETE CBC W/AUTO DIFF WBC: CPT

## 2025-07-30 PROCEDURE — 99215 OFFICE O/P EST HI 40 MIN: CPT | Mod: PBBFAC | Performed by: INTERNAL MEDICINE

## 2025-07-30 PROCEDURE — 84165 PROTEIN E-PHORESIS SERUM: CPT

## 2025-07-30 PROCEDURE — 99204 OFFICE O/P NEW MOD 45 MIN: CPT | Mod: S$PBB,,, | Performed by: INTERNAL MEDICINE

## 2025-07-30 PROCEDURE — 84443 ASSAY THYROID STIM HORMONE: CPT

## 2025-07-30 PROCEDURE — 84466 ASSAY OF TRANSFERRIN: CPT

## 2025-07-30 PROCEDURE — 80053 COMPREHEN METABOLIC PANEL: CPT

## 2025-07-30 PROCEDURE — 83521 IG LIGHT CHAINS FREE EACH: CPT

## 2025-07-30 PROCEDURE — 82728 ASSAY OF FERRITIN: CPT

## 2025-07-30 PROCEDURE — 82668 ASSAY OF ERYTHROPOIETIN: CPT

## 2025-07-30 PROCEDURE — 85045 AUTOMATED RETICULOCYTE COUNT: CPT

## 2025-07-30 PROCEDURE — 83615 LACTATE (LD) (LDH) ENZYME: CPT

## 2025-07-30 PROCEDURE — 36415 COLL VENOUS BLD VENIPUNCTURE: CPT

## 2025-07-30 PROCEDURE — 85652 RBC SED RATE AUTOMATED: CPT

## 2025-07-30 PROCEDURE — G2211 COMPLEX E/M VISIT ADD ON: HCPCS | Mod: ,,, | Performed by: INTERNAL MEDICINE

## 2025-07-30 RX ORDER — HYDROCODONE BITARTRATE AND ACETAMINOPHEN 10; 325 MG/1; MG/1
1 TABLET ORAL EVERY 8 HOURS PRN
Qty: 90 TABLET | Refills: 0 | Status: SHIPPED | OUTPATIENT
Start: 2025-07-31 | End: 2025-08-30

## 2025-07-30 NOTE — PROGRESS NOTES
HPI    MARIBEL: 01/2023  Last DFE: 01/2023    Chief complaint (CC): 81 yo F presents for annual diabetic eye exam. Pt   presents today with her daughter who is her care giver. Pt reports blob   blocking vision OS which started over 1 month ago.  Pt denies any trauma.   Pt reports poor vision @ [facility]. Pt denies any other ocular or visual   complaints today.      Glasses? +  Contacts? -  H/o eye surgery, injections or laser: -  H/o eye injury: -  Known eye conditions? -  Family h/o eye conditions? -  Eye gtts? -    (+) Flashes- pt reports auras for several months  they appear as different   colors OS>OD  (+)  Floaters OS pt reports blob in the vision started over 1 month ago/   pt reports floaters OU   (+) Mucous (+)  Tearing (+) Itching  (+) Burning    (+) Headaches (+) Eye Pain/discomfort (-) Irritation   (+)  Redness (-) Double vision (+) Blurry vision    CL Exam: No      DM Dx'ed   BS: 118  Diabetic? Yes  A1c? Lab Results       Component                Value               Date                       HGBA1C                   8.0 (H)             07/01/2025              Last edited by James Mendez, OD on 7/30/2025  2:52 PM.            Assessment /Plan     For exam results, see Encounter Report.      Diabetes mellitus type 2 without retinopathy  Type 2 diabetes mellitus with chronic kidney disease, with long-term current use of insulin, unspecified CKD stage  - Pt educated on the importance of maintaining adequate glycemic and blood pressure control via diet, compliance with medications, exercise and timely follow up with PCP.  No diabetic retinopathy, No CSME to extent seen. Return in 1 year for dilated eye exam.    Combined form of senile cataract of both eyes   - Visually significant OS>>> OD. Pt educated on findings.    - Pt referred for CE Eval and B scan OS.

## 2025-07-30 NOTE — PROGRESS NOTES
CHIEF COMPLAINT: She is anemic     HPI: Mrs Waters is a 80 y.o. year-old woman who is referred by her PCP for evaluation of ane as a history of longstanding COPD and has been on home oxygen for the last year. She has had frequent episodes here mainly with Dr Zapata as wel as pain management and endocrinology. She has SLE as well as diabetes and ischemic cardiomyopathy and fibromyalgia. She has had chronic anemia in the past with recent hemoglobin levels in the 9s. A  year ago, her hemogglobin was in the 10s and 2 years ago, they were in the 11s. She has had anemia workup in the past with most notable result being a slightly elevated STRA. She has never received iron infusions. She ambulates with a walker or cane at home but is in a wheelchair at home.  She was last hospitalized with pneumonia in April. She had another hospuitalization in July wiith generalized weakness. Palliative care was to be established. There was concern about renal insufficiency    Past Medical History:   Diagnosis Date    Arthritis     Back pain     Cancer     ovarian    Cervical cancer     Coronary artery disease     Depression     Diabetes mellitus     Fibromyalgia     Heart attack     History of MI (myocardial infarction)     Hyperlipidemia     Hypertension     Lupus     Stroke     slight left sided weakness     Past Surgical History:   Procedure Laterality Date    APPENDECTOMY       SECTION      2    CORONARY ANGIOGRAPHY N/A 2022    Procedure: ANGIOGRAM, CORONARY ARTERY;  Surgeon: Jose L Méndez MD;  Location: [facility] CATH LAB;  Service: Cardiology;  Laterality: N/A;    HYSTERECTOMY      with USO for cervical cancer    INJECTION OF ANESTHETIC AGENT AROUND NERVE Bilateral 2021    Procedure: BLOCK, NERVE, SYMPATHIC;  Surgeon: Holden Pereira MD;  Location: [facility] PAIN MGT;  Service: Pain Management;  Laterality: Bilateral;    INJECTION OF ANESTHETIC AGENT AROUND NERVE N/A 2021    Procedure: BLOCK, NERVE, SYMPATHETIC   need consent;  Surgeon: Holden Pereira MD;  Location: [facility];  Service: Pain Management;  Laterality: N/A;    YARED LINK,SWALLOW FUNCTION,CINE/VIDEO RECORD  2021         TONSILLECTOMY      TRIAL OF SPINAL CORD NERVE STIMULATOR N/A 3/8/2023    Procedure: LUMBAR SPINAL CORD STIMULATOR TRIAL SUERO REP PATIENT STATES SHE NO LONGER TAKES PLAVIX;  Surgeon: Holden Pereira MD;  Location: [facility];  Service: Pain Management;  Laterality: N/A;     Current Medications[1]  Review of patient's allergies indicates:   Allergen Reactions    Bleach (sodium hypochlorite) Shortness Of Breath    Nitrofurantoin macrocrystalline Anaphylaxis    Pcn [penicillins] Shortness Of Breath    Lipitor [atorvastatin] Diarrhea and Rash    Nsaids (non-steroidal anti-inflammatory drug)      Tolerates aspirin      Statins-hmg-coa reductase inhibitors     Toradol [ketorolac]      Social History: She was born and raised [place] She moved to [place] 7 years ago to live with her oldest daughter after se lost her . She lives with  her daughter Upwn. She stopped smoking in  when she had a heart attack. She does not drionk alcohol or use illicit drugs. She has worked as a manager at [employer]  Family History: Her mother dies at 81 from age related conditions. Her father  in his 70s from diabetes. She has 3 brothers 2 with diabetes.  She has 3 daughters and 1 son. Her son  at 37 from a heart attacke.,     REVIEW OF SYSTEMS:  Gen: There are no fevers or chills. There is no weight loss. There is no fatigue. The appetite is normal.  Head/Skull: There are no headaches.  Eyes: There is no blurred or double vision. There is no eye pain. There is no glaucoma or cataracts.  Ears: There is no loss of hearing, pain, tinnitus, vertigo  Nasal: There are no nosebleeds  Mouth: There is no sore throat. There are no mouth sores. There is no hoarseness.  Cardiovascular: There is no chest pain. There are no palpitations.  There is no paroxysmal nocturnal dyspnea. There is no orthopnea. There is no leg swelling or claudication.  Pulmonary: She is on home O2 There is no wheezing. There is no hemoptysis.  GI: There is no dysphagia or odynophagia. There is no nausea or vomiting. There is no abdominal pain. There is no constipation or diarrhea. There is no melena or hematochezia.  Genitourinary: There is no dysuria, hematuria or urinary incontinence. There is no hesitancy.   Musculoskeletal: There are no bone or joint pains. There is no back pain reported.  Neurologic: There is no history of strokes or seizures. There are no neuropathic symptoms. There is no history of gait or speech disturbance.  Dermatologic: There are no rashes or pruritus. There are no sores that will not heal. There are no changes in moles or atypical appearing pigmented lesions.  Endocrine: There is no heat or cold intolerance. There is no polydipsia, polyphagia, or polyuria.  Psychiatric: There is no history of anxiety. There is no history of depression. There is no history of psychiatric hospitalization.   Hematologic: There is no history of hemoglobinopathy or thalassemia. There is no known bleeding diathesis or clotting disorder.  Female genitalia: There is no vaginal discharge or bleeding. There is no pelvic pain.     Physical Exam:  VS: 140/68       95         20          98.2        90.8 kg  Gen: Awake and Alert, No Apparent Distress  HEENT: NCAT, PERRLA, EOMI,  Moist oral, nasal and ocular mucus membranes, OP Clear  Neck: Supple, No JVD or Adenopathy. Trachea is Midline, No Thyromegaly, No thyroid masses  Heart: Regular Rhythm and rate, No murmurs, gallops or rubs  Lungs: Symmetric chest excursions bilaterally. No use of accessory respiratory muscles. Lungs are clear to auscultation bilaterally  Abdomen: Soft, Nontender/Nondistended. There are normoactive bowel sounds. There is no hepatosplenomegaly  Extremities:  There is no peripheral edema. No joint  deformities, joint effusions or joint tenderness in the ankles, knees, hips, shoulders, elbows, wristst and digits. There is no cyanosis. 2+ radial and dorsalis pedis pulses.   Back: Straight, No paraspinal tenderness or CVA tenderness  Skin: Intact. No sites of breakdown. No subcutaneous nodules. No rashes. No petechiae. No ecchymoses.  Lymphatics: No cervical, supraclavicular, axillary or inguinal adenopathy  Neurologic: Awake, alert and fully oriented. Cranial nerves II-XII intact.     Laboratory Data:   Results for orders placed or performed during the hospital encounter of 07/16/25   EKG 12-lead    Collection Time: 07/16/25 12:59 PM   Result Value Ref Range    QRS Duration 80 ms    [facility]HS QTC Calculation 469 ms   Hepatitis C Antibody    Collection Time: 07/16/25  2:08 PM   Result Value Ref Range    Hep C Ab Interp Negative Negative   HCV Virus Hold Specimen    Collection Time: 07/16/25  2:08 PM   Result Value Ref Range    Extra Tube 1    HIV 1/2 Ag/Ab (4th Gen)    Collection Time: 07/16/25  2:08 PM   Result Value Ref Range    HIV 1/2 Ag/Ab Negative Negative   TSH    Collection Time: 07/16/25  2:08 PM   Result Value Ref Range    TSH 1.551 0.400 - 4.000 uIU/mL   Magnesium    Collection Time: 07/16/25  2:08 PM   Result Value Ref Range    Magnesium  2.0 1.6 - 2.6 mg/dL   Troponin I    Collection Time: 07/16/25  2:08 PM   Result Value Ref Range    Troponin-I 0.009 <=0.026 ng/mL   Comprehensive Metabolic Panel    Collection Time: 07/16/25  2:08 PM   Result Value Ref Range    Sodium 139 136 - 145 mmol/L    Potassium 4.7 3.5 - 5.1 mmol/L    Chloride 101 95 - 110 mmol/L    CO2 22 (L) 23 - 29 mmol/L    Glucose 205 (H) 70 - 110 mg/dL    BUN 31 (H) 8 - 23 mg/dL    Creatinine 1.4 0.5 - 1.4 mg/dL    Calcium 9.7 8.7 - 10.5 mg/dL    Protein Total 7.8 6.0 - 8.4 gm/dL    Albumin 3.8 3.5 - 5.2 g/dL    Bilirubin Total 0.2 0.1 - 1.0 mg/dL    ALP 60 40 - 150 unit/L    AST 25 11 - 45 unit/L    ALT 15 10 - 44 unit/L    Anion Gap 16 8 -  16 mmol/L    eGFR 38 (L) >60 mL/min/1.73/m2   CBC with Differential    Collection Time: 07/16/25  2:08 PM   Result Value Ref Range    WBC 12.64 3.90 - 12.70 K/uL    RBC 3.83 (L) 4.00 - 5.40 M/uL    HGB 10.7 (L) 12.0 - 16.0 gm/dL    HCT 32.9 (L) 37.0 - 48.5 %    MCV 86 82 - 98 fL    MCH 27.9 27.0 - 31.0 pg    MCHC 32.5 32.0 - 36.0 g/dL    RDW 15.6 (H) 11.5 - 14.5 %    Platelet Count 319 150 - 450 K/uL    MPV 11.7 9.2 - 12.9 fL    Nucleated RBC 0 <=0 /100 WBC    Neut % 66.0 38 - 73 %    Lymph % 21.6 18 - 48 %    Mono % 7.8 4 - 15 %    Eos % 3.8 <=8 %    Basophil % 0.6 <=1.9 %    Imm Grans % 0.2 0.0 - 0.5 %    Neut # 8.35 (H) 1.8 - 7.7 K/uL    Lymph # 2.73 1 - 4.8 K/uL    Mono # 0.98 0.3 - 1 K/uL    Eos # 0.48 <=0.5 K/uL    Baso # 0.07 <=0.2 K/uL    Imm Grans # 0.03 0.00 - 0.04 K/uL   Urinalysis, Reflex to Urine Culture Urine, Clean Catch    Collection Time: 07/16/25  2:10 PM    Specimen: Urine   Result Value Ref Range    Color, UA Colorless (A) Straw, Aye, Yellow, Light-Orange    Appearance, UA Clear Clear    pH, UA 7.0 5.0 - 8.0    Spec Grav UA 1.015 1.005 - 1.030    Protein, UA Negative Negative    Glucose, UA Negative Negative    Ketones, UA Negative Negative    Bilirubin, UA Negative Negative    Blood, UA Negative Negative    Nitrites, UA Negative Negative    Urobilinogen, UA Negative <2.0 EU/dL    Leukocyte Esterase, UA Negative Negative   [place] URINE HOLD    Collection Time: 07/16/25  2:10 PM   Result Value Ref Range    Extra Tube Hold for add-ons.    Creatinine, urine, random    Collection Time: 07/16/25  2:10 PM   Result Value Ref Range    Urine Creatinine 19.9 15.0 - 325.0 mg/dL   BNP    Collection Time: 07/16/25  3:57 PM   Result Value Ref Range     (H) 0 - 99 pg/mL   Osmolality, urine    Collection Time: 07/16/25  6:17 PM   Result Value Ref Range    Urine Osmolality 256 50 - 1,200 mOsm/kg   Sodium, urine, random    Collection Time: 07/16/25  6:17 PM   Result Value Ref Range    Urine Sodium 65 20  - 250 mmol/L   Protein / creatinine ratio, urine    Collection Time: 07/16/25  6:17 PM   Result Value Ref Range    Urine Creatinine 20.0 15.0 - 325.0 mg/dL    Urine Protein <7 <=15 mg/dL    Urine Protein/Creatinine Ratio     POCT glucose    Collection Time: 07/16/25  8:26 PM   Result Value Ref Range    POCT Glucose 209 (H) 70 - 110 mg/dL   POCT glucose    Collection Time: 07/16/25 10:51 PM   Result Value Ref Range    POCT Glucose 291 (H) 70 - 110 mg/dL   Comprehensive Metabolic Panel    Collection Time: 07/17/25  3:52 AM   Result Value Ref Range    Sodium 140 136 - 145 mmol/L    Potassium 4.3 3.5 - 5.1 mmol/L    Chloride 103 95 - 110 mmol/L    CO2 24 23 - 29 mmol/L    Glucose 211 (H) 70 - 110 mg/dL    BUN 26 (H) 8 - 23 mg/dL    Creatinine 1.3 0.5 - 1.4 mg/dL    Calcium 9.1 8.7 - 10.5 mg/dL    Protein Total 7.0 6.0 - 8.4 gm/dL    Albumin 3.4 (L) 3.5 - 5.2 g/dL    Bilirubin Total 0.2 0.1 - 1.0 mg/dL    ALP 54 40 - 150 unit/L    AST 17 11 - 45 unit/L    ALT 12 10 - 44 unit/L    Anion Gap 13 8 - 16 mmol/L    eGFR 42 (L) >60 mL/min/1.73/m2   Magnesium    Collection Time: 07/17/25  3:52 AM   Result Value Ref Range    Magnesium  2.1 1.6 - 2.6 mg/dL   CBC with Differential    Collection Time: 07/17/25  3:52 AM   Result Value Ref Range    WBC 11.07 3.90 - 12.70 K/uL    RBC 3.77 (L) 4.00 - 5.40 M/uL    HGB 10.1 (L) 12.0 - 16.0 gm/dL    HCT 32.5 (L) 37.0 - 48.5 %    MCV 86 82 - 98 fL    MCH 26.8 (L) 27.0 - 31.0 pg    MCHC 31.1 (L) 32.0 - 36.0 g/dL    RDW 15.5 (H) 11.5 - 14.5 %    Platelet Count 295 150 - 450 K/uL    MPV 11.6 9.2 - 12.9 fL    Nucleated RBC 0 <=0 /100 WBC    Neut % 55.3 38 - 73 %    Lymph % 27.9 18 - 48 %    Mono % 10.3 4 - 15 %    Eos % 5.5 <=8 %    Basophil % 0.6 <=1.9 %    Imm Grans % 0.4 0.0 - 0.5 %    Neut # 6.12 1.8 - 7.7 K/uL    Lymph # 3.09 1 - 4.8 K/uL    Mono # 1.14 (H) 0.3 - 1 K/uL    Eos # 0.61 (H) <=0.5 K/uL    Baso # 0.07 <=0.2 K/uL    Imm Grans # 0.04 0.00 - 0.04 K/uL   POCT glucose    Collection  Time: 07/17/25  7:58 AM   Result Value Ref Range    POCT Glucose 259 (H) 70 - 110 mg/dL   POCT glucose    Collection Time: 07/17/25 11:32 AM   Result Value Ref Range    POCT Glucose 423 (H) 70 - 110 mg/dL   POCT glucose    Collection Time: 07/17/25  4:26 PM   Result Value Ref Range    POCT Glucose 199 (H) 70 - 110 mg/dL   POCT glucose    Collection Time: 07/17/25  5:51 PM   Result Value Ref Range    POCT Glucose 70 70 - 110 mg/dL   POCT glucose    Collection Time: 07/17/25  7:26 PM   Result Value Ref Range    POCT Glucose 140 (H) 70 - 110 mg/dL   Basic metabolic panel    Collection Time: 07/18/25  5:58 AM   Result Value Ref Range    Sodium 140 136 - 145 mmol/L    Potassium 4.3 3.5 - 5.1 mmol/L    Chloride 103 95 - 110 mmol/L    CO2 25 23 - 29 mmol/L    Glucose 113 (H) 70 - 110 mg/dL    BUN 24 (H) 8 - 23 mg/dL    Creatinine 1.4 0.5 - 1.4 mg/dL    Calcium 9.3 8.7 - 10.5 mg/dL    Anion Gap 12 8 - 16 mmol/L    eGFR 38 (L) >60 mL/min/1.73/m2   CBC with Differential    Collection Time: 07/18/25  5:58 AM   Result Value Ref Range    WBC 9.95 3.90 - 12.70 K/uL    RBC 3.86 (L) 4.00 - 5.40 M/uL    HGB 10.5 (L) 12.0 - 16.0 gm/dL    HCT 32.2 (L) 37.0 - 48.5 %    MCV 83 82 - 98 fL    MCH 27.2 27.0 - 31.0 pg    MCHC 32.6 32.0 - 36.0 g/dL    RDW 15.4 (H) 11.5 - 14.5 %    Platelet Count 313 150 - 450 K/uL    MPV 11.5 9.2 - 12.9 fL    Nucleated RBC 0 <=0 /100 WBC    Neut % 51.8 38 - 73 %    Lymph % 29.9 18 - 48 %    Mono % 10.4 4 - 15 %    Eos % 7.0 <=8 %    Basophil % 0.6 <=1.9 %    Imm Grans % 0.3 0.0 - 0.5 %    Neut # 5.15 1.8 - 7.7 K/uL    Lymph # 2.98 1 - 4.8 K/uL    Mono # 1.03 (H) 0.3 - 1 K/uL    Eos # 0.70 (H) <=0.5 K/uL    Baso # 0.06 <=0.2 K/uL    Imm Grans # 0.03 0.00 - 0.04 K/uL   Lipase    Collection Time: 07/18/25  5:58 AM   Result Value Ref Range    Lipase Level 19 4 - 60 U/L   POCT glucose    Collection Time: 07/18/25  7:40 AM   Result Value Ref Range    POCT Glucose 142 (H) 70 - 110 mg/dL   POCT glucose    Collection  Time: 07/18/25 12:14 PM   Result Value Ref Range    POCT Glucose 438 (H) 70 - 110 mg/dL   POCT glucose    Collection Time: 07/18/25  4:36 PM   Result Value Ref Range    POCT Glucose 356 (H) 70 - 110 mg/dL   POCT glucose    Collection Time: 07/18/25  7:31 PM   Result Value Ref Range    POCT Glucose 208 (H) 70 - 110 mg/dL   Basic Metabolic Panel    Collection Time: 07/19/25  3:16 AM   Result Value Ref Range    Sodium 136 136 - 145 mmol/L    Potassium 4.4 3.5 - 5.1 mmol/L    Chloride 100 95 - 110 mmol/L    CO2 23 23 - 29 mmol/L    Glucose 284 (H) 70 - 110 mg/dL    BUN 29 (H) 8 - 23 mg/dL    Creatinine 1.4 0.5 - 1.4 mg/dL    Calcium 8.7 8.7 - 10.5 mg/dL    Anion Gap 13 8 - 16 mmol/L    eGFR 38 (L) >60 mL/min/1.73/m2   Magnesium    Collection Time: 07/19/25  3:16 AM   Result Value Ref Range    Magnesium  1.9 1.6 - 2.6 mg/dL   Phosphorus    Collection Time: 07/19/25  3:16 AM   Result Value Ref Range    Phosphorus Level 4.3 2.7 - 4.5 mg/dL   CBC with Differential    Collection Time: 07/19/25  3:16 AM   Result Value Ref Range    WBC 11.02 3.90 - 12.70 K/uL    RBC 3.71 (L) 4.00 - 5.40 M/uL    HGB 9.9 (L) 12.0 - 16.0 gm/dL    HCT 31.5 (L) 37.0 - 48.5 %    MCV 85 82 - 98 fL    MCH 26.7 (L) 27.0 - 31.0 pg    MCHC 31.4 (L) 32.0 - 36.0 g/dL    RDW 15.2 (H) 11.5 - 14.5 %    Platelet Count 306 150 - 450 K/uL    MPV 12.0 9.2 - 12.9 fL    Nucleated RBC 0 <=0 /100 WBC    Neut % 56.1 38 - 73 %    Lymph % 26.1 18 - 48 %    Mono % 9.7 4 - 15 %    Eos % 7.2 <=8 %    Basophil % 0.6 <=1.9 %    Imm Grans % 0.3 0.0 - 0.5 %    Neut # 6.18 1.8 - 7.7 K/uL    Lymph # 2.88 1 - 4.8 K/uL    Mono # 1.07 (H) 0.3 - 1 K/uL    Eos # 0.79 (H) <=0.5 K/uL    Baso # 0.07 <=0.2 K/uL    Imm Grans # 0.03 0.00 - 0.04 K/uL   POCT glucose    Collection Time: 07/19/25  8:01 AM   Result Value Ref Range    POCT Glucose 382 (H) 70 - 110 mg/dL   POCT glucose    Collection Time: 07/19/25  8:10 AM   Result Value Ref Range    POCT Glucose 393 (H) 70 - 110 mg/dL   POCT  glucose    Collection Time: 07/19/25 12:08 PM   Result Value Ref Range    POCT Glucose 321 (H) 70 - 110 mg/dL   POCT glucose    Collection Time: 07/19/25  4:16 PM   Result Value Ref Range    POCT Glucose 232 (H) 70 - 110 mg/dL   POCT glucose    Collection Time: 07/19/25  7:24 PM   Result Value Ref Range    POCT Glucose 146 (H) 70 - 110 mg/dL   Basic Metabolic Panel    Collection Time: 07/20/25  5:07 AM   Result Value Ref Range    Sodium 134 (L) 136 - 145 mmol/L    Potassium 4.4 3.5 - 5.1 mmol/L    Chloride 99 95 - 110 mmol/L    CO2 24 23 - 29 mmol/L    Glucose 297 (H) 70 - 110 mg/dL    BUN 31 (H) 8 - 23 mg/dL    Creatinine 1.5 (H) 0.5 - 1.4 mg/dL    Calcium 9.0 8.7 - 10.5 mg/dL    Anion Gap 11 8 - 16 mmol/L    eGFR 35 (L) >60 mL/min/1.73/m2   Magnesium    Collection Time: 07/20/25  5:07 AM   Result Value Ref Range    Magnesium  1.9 1.6 - 2.6 mg/dL   Phosphorus    Collection Time: 07/20/25  5:07 AM   Result Value Ref Range    Phosphorus Level 4.0 2.7 - 4.5 mg/dL   CBC with Differential    Collection Time: 07/20/25  5:07 AM   Result Value Ref Range    WBC 11.41 3.90 - 12.70 K/uL    RBC 3.72 (L) 4.00 - 5.40 M/uL    HGB 10.1 (L) 12.0 - 16.0 gm/dL    HCT 31.6 (L) 37.0 - 48.5 %    MCV 85 82 - 98 fL    MCH 27.2 27.0 - 31.0 pg    MCHC 32.0 32.0 - 36.0 g/dL    RDW 15.2 (H) 11.5 - 14.5 %    Platelet Count 292 150 - 450 K/uL    MPV 12.0 9.2 - 12.9 fL    Nucleated RBC 0 <=0 /100 WBC    Neut % 55.6 38 - 73 %    Lymph % 27.2 18 - 48 %    Mono % 8.6 4 - 15 %    Eos % 7.6 <=8 %    Basophil % 0.6 <=1.9 %    Imm Grans % 0.4 0.0 - 0.5 %    Neut # 6.34 1.8 - 7.7 K/uL    Lymph # 3.10 1 - 4.8 K/uL    Mono # 0.98 0.3 - 1 K/uL    Eos # 0.87 (H) <=0.5 K/uL    Baso # 0.07 <=0.2 K/uL    Imm Grans # 0.05 (H) 0.00 - 0.04 K/uL   POCT glucose    Collection Time: 07/20/25  8:15 AM   Result Value Ref Range    POCT Glucose 352 (H) 70 - 110 mg/dL   POCT glucose    Collection Time: 07/20/25 11:25 AM   Result Value Ref Range    POCT Glucose 339 (H) 70  - 110 mg/dL   POCT glucose    Collection Time: 07/20/25  4:40 PM   Result Value Ref Range    POCT Glucose 220 (H) 70 - 110 mg/dL   Basic Metabolic Panel    Collection Time: 07/21/25  4:29 AM   Result Value Ref Range    Sodium 138 136 - 145 mmol/L    Potassium 4.5 3.5 - 5.1 mmol/L    Chloride 103 95 - 110 mmol/L    CO2 24 23 - 29 mmol/L    Glucose 177 (H) 70 - 110 mg/dL    BUN 27 (H) 8 - 23 mg/dL    Creatinine 1.2 0.5 - 1.4 mg/dL    Calcium 8.9 8.7 - 10.5 mg/dL    Anion Gap 11 8 - 16 mmol/L    eGFR 46 (L) >60 mL/min/1.73/m2   Magnesium    Collection Time: 07/21/25  4:29 AM   Result Value Ref Range    Magnesium  2.1 1.6 - 2.6 mg/dL   Phosphorus    Collection Time: 07/21/25  4:29 AM   Result Value Ref Range    Phosphorus Level 4.2 2.7 - 4.5 mg/dL   CBC with Differential    Collection Time: 07/21/25  4:29 AM   Result Value Ref Range    WBC 10.87 3.90 - 12.70 K/uL    RBC 3.52 (L) 4.00 - 5.40 M/uL    HGB 9.5 (L) 12.0 - 16.0 gm/dL    HCT 29.8 (L) 37.0 - 48.5 %    MCV 85 82 - 98 fL    MCH 27.0 27.0 - 31.0 pg    MCHC 31.9 (L) 32.0 - 36.0 g/dL    RDW 15.5 (H) 11.5 - 14.5 %    Platelet Count 293 150 - 450 K/uL    MPV 11.8 9.2 - 12.9 fL    Nucleated RBC 0 <=0 /100 WBC    Neut % 59.4 38 - 73 %    Lymph % 23.3 18 - 48 %    Mono % 8.5 4 - 15 %    Eos % 7.7 <=8 %    Basophil % 0.6 <=1.9 %    Imm Grans % 0.5 0.0 - 0.5 %    Neut # 6.46 1.8 - 7.7 K/uL    Lymph # 2.53 1 - 4.8 K/uL    Mono # 0.92 0.3 - 1 K/uL    Eos # 0.84 (H) <=0.5 K/uL    Baso # 0.07 <=0.2 K/uL    Imm Grans # 0.05 (H) 0.00 - 0.04 K/uL   POCT glucose    Collection Time: 07/21/25  7:48 AM   Result Value Ref Range    POCT Glucose 206 (H) 70 - 110 mg/dL   POCT glucose    Collection Time: 07/21/25 11:41 AM   Result Value Ref Range    POCT Glucose 319 (H) 70 - 110 mg/dL   POCT glucose    Collection Time: 07/21/25  4:33 PM   Result Value Ref Range    POCT Glucose 212 (H) 70 - 110 mg/dL   POCT glucose    Collection Time: 07/21/25  8:10 PM   Result Value Ref Range    POCT  Glucose 158 (H) 70 - 110 mg/dL   Basic Metabolic Panel    Collection Time: 07/22/25  3:46 AM   Result Value Ref Range    Sodium 136 136 - 145 mmol/L    Potassium 4.9 3.5 - 5.1 mmol/L    Chloride 102 95 - 110 mmol/L    CO2 25 23 - 29 mmol/L    Glucose 262 (H) 70 - 110 mg/dL    BUN 26 (H) 8 - 23 mg/dL    Creatinine 1.3 0.5 - 1.4 mg/dL    Calcium 8.8 8.7 - 10.5 mg/dL    Anion Gap 9 8 - 16 mmol/L    eGFR 42 (L) >60 mL/min/1.73/m2   Magnesium    Collection Time: 07/22/25  3:46 AM   Result Value Ref Range    Magnesium  2.0 1.6 - 2.6 mg/dL   Phosphorus    Collection Time: 07/22/25  3:46 AM   Result Value Ref Range    Phosphorus Level 4.1 2.7 - 4.5 mg/dL   CBC with Differential    Collection Time: 07/22/25  3:46 AM   Result Value Ref Range    WBC 9.81 3.90 - 12.70 K/uL    RBC 3.53 (L) 4.00 - 5.40 M/uL    HGB 9.7 (L) 12.0 - 16.0 gm/dL    HCT 30.3 (L) 37.0 - 48.5 %    MCV 86 82 - 98 fL    MCH 27.5 27.0 - 31.0 pg    MCHC 32.0 32.0 - 36.0 g/dL    RDW 15.7 (H) 11.5 - 14.5 %    Platelet Count 268 150 - 450 K/uL    MPV 11.5 9.2 - 12.9 fL    Nucleated RBC 0 <=0 /100 WBC    Neut % 54.0 38 - 73 %    Lymph % 27.8 18 - 48 %    Mono % 9.0 4 - 15 %    Eos % 7.8 <=8 %    Basophil % 1.0 <=1.9 %    Imm Grans % 0.4 0.0 - 0.5 %    Neut # 5.29 1.8 - 7.7 K/uL    Lymph # 2.73 1 - 4.8 K/uL    Mono # 0.88 0.3 - 1 K/uL    Eos # 0.77 (H) <=0.5 K/uL    Baso # 0.10 <=0.2 K/uL    Imm Grans # 0.04 0.00 - 0.04 K/uL   POCT glucose    Collection Time: 07/22/25  7:40 AM   Result Value Ref Range    POCT Glucose 273 (H) 70 - 110 mg/dL   POCT glucose    Collection Time: 07/22/25 11:32 AM   Result Value Ref Range    POCT Glucose 236 (H) 70 - 110 mg/dL     *Note: Due to a large number of results and/or encounters for the requested time period, some results have not been displayed. A complete set of results can be found in Results Review.         Assessment and Plan:  Multifactoral Anemia   - Will reassess for Fe deficincy with STRA   - Renal Insufficiency may  "contribuute            [1]   Current Outpatient Medications:     acetaminophen (TYLENOL) 500 MG tablet, Take 2 tablets (1,000 mg total) by mouth every 8 (eight) hours as needed for Pain., Disp: , Rfl: 0    albuterol (ACCUNEB) 1.25 mg/3 mL Nebu, Take 3 mLs (1.25 mg total) by nebulization every 6 (six) hours as needed (for wheezing or shortness of breath). Rescue, Disp: 300 mL, Rfl: 11    allopurinoL (ZYLOPRIM) 300 MG tablet, Take 1 tablet (300 mg total) by mouth once daily., Disp: 90 tablet, Rfl: 3    aspirin (ECOTRIN) 81 MG EC tablet, Take 1 tablet (81 mg total) by mouth once daily., Disp: 30 tablet, Rfl: 0    BD ULTRA-FINE MICRO PEN NEEDLE 32 gauge x 1/4" Ndle, Use with insulin 5 times a day., Disp: 400 each, Rfl: 10    blood sugar diagnostic Strp, To check BG 4 times daily, to use with insurance preferred meter, Disp: 200 each, Rfl: 11    blood-glucose meter kit, To check BG 4 times daily, to use with insurance preferred meter, Disp: 1 each, Rfl: 0    capsaicin (ZOSTRIX) 0.025 % cream, Apply topically 3 (three) times daily., Disp: 50 g, Rfl: 0    DULoxetine (CYMBALTA) 20 MG capsule, Take 1 capsule (20 mg total) by mouth once daily., Disp: 30 capsule, Rfl: 0    evolocumab (REPATHA SURECLICK) 140 mg/mL PnIj, Inject 1 mL (140 mg total) into the skin every 14 (fourteen) days., Disp: 2 each, Rfl: 11    fenofibrate micronized (LOFIBRA) 134 MG Cap, Take 1 capsule (134 mg total) by mouth daily with breakfast., Disp: 90 capsule, Rfl: 3    glucagon (BAQSIMI) 3 mg/actuation Spry, 3 mg by Nasal route as needed (Severe Hypoglycemia)., Disp: 1 each, Rfl: 1    HYDROcodone-acetaminophen (NORCO)  mg per tablet, Take 1 tablet by mouth every 8 (eight) hours as needed for Pain., Disp: 90 tablet, Rfl: 0    insulin glargine U-100, Lantus, (LANTUS SOLOSTAR U-100 INSULIN) 100 unit/mL (3 mL) InPn pen, Inject 26 Units into the skin 2 (two) times a day., Disp: 60 mL, Rfl: 3    insulin lispro (HUMALOG KWIKPEN INSULIN) 100 unit/mL pen, " Inject 15 Units into the skin before breakfast AND 15 Units daily with lunch AND 11 Units daily with dinner or evening meal. Plus correction scale. Max TDD 50units.., Disp: 40 mL, Rfl: 3    ipratropium-albuteroL (COMBIVENT RESPIMAT)  mcg/actuation inhaler, Inhale 1 puff into the lungs every 6 (six) hours., Disp: 12 g, Rfl: 3    lancets Misc, To check BG 4 times daily, to use with insurance preferred meter, Disp: 200 each, Rfl: 11    [Paused] losartan (COZAAR) 50 MG tablet, Take 1 tablet (50 mg total) by mouth once daily., Disp: 90 tablet, Rfl: 3    meclizine (ANTIVERT) 25 mg tablet, Take 1 tablet (25 mg total) by mouth 3 (three) times daily as needed for Dizziness., Disp: 30 tablet, Rfl: 0    melatonin (MELATIN) 3 mg tablet, Take 2 tablets (6 mg total) by mouth nightly as needed for Insomnia., Disp: , Rfl:     methocarbamoL (ROBAXIN) 500 MG Tab, Take 1 tablet (500 mg total) by mouth 3 (three) times daily., Disp: 90 tablet, Rfl: 2    metoclopramide HCl (REGLAN) 5 MG tablet, TAKE ONE TABLET BY MOUTH FOUR TIMES DAILY AS NEEDED FOR nausea prevention, Disp: 90 tablet, Rfl: 3    metoprolol succinate (TOPROL-XL) 100 MG 24 hr tablet, Take 1 tablet (100 mg total) by mouth once daily., Disp: 90 tablet, Rfl: 3    NIFEdipine (PROCARDIA-XL) 30 MG (OSM) 24 hr tablet, TAKE ONE TABLET BY MOUTH TWICE DAILY, Disp: 180 tablet, Rfl: 3    nitroGLYCERIN (NITROSTAT) 0.4 MG SL tablet, DISSOLVE 1 TABLET UNDER THE TONGUE EVERY 5 MINUTES AS NEEDED FOR CHEST PAIN. DO NOT EXCEED A TOTAL OF 3 DOSES IN 15 MINUTES., Disp: 25 tablet, Rfl: 11    nystatin (MYCOSTATIN) cream, Apply topically 2 (two) times daily. Underneath Alexus's buttpaste for the rash, Disp: 30 g, Rfl: 1    NYSTOP powder, APPLY TO THE AFFECTED AREA(S) FOUR TIMES DAILY, Disp: 60 g, Rfl: 2    pregabalin (LYRICA) 150 MG capsule, Take 1 capsule (150 mg total) by mouth 2 (two) times daily., Disp: 60 capsule, Rfl: 5    senna-docusate 8.6-50 mg (PERICOLACE) 8.6-50 mg per tablet,  Take 1 tablet by mouth 2 (two) times daily as needed for Constipation., Disp: 60 tablet, Rfl: 0    sertraline (ZOLOFT) 100 MG tablet, TAKE ONE TABLET BY MOUTH EVERY DAY, Disp: 90 tablet, Rfl: 0    [Paused] torsemide (DEMADEX) 20 MG Tab, TAKE ONE TABLET BY MOUTH ONCE DAILY, Disp: 90 tablet, Rfl: 3

## 2025-07-31 ENCOUNTER — TELEPHONE (OUTPATIENT)
Dept: INTERNAL MEDICINE | Facility: CLINIC | Age: 80
End: 2025-07-31
Payer: MEDICARE

## 2025-07-31 ENCOUNTER — PATIENT MESSAGE (OUTPATIENT)
Dept: HEMATOLOGY/ONCOLOGY | Facility: CLINIC | Age: 80
End: 2025-07-31
Payer: MEDICARE

## 2025-07-31 ENCOUNTER — DOCUMENT SCAN (OUTPATIENT)
Dept: HOME HEALTH SERVICES | Facility: HOSPITAL | Age: 80
End: 2025-07-31
Payer: MEDICARE

## 2025-07-31 LAB
ALBUMIN, SPE (OHS): 3.84 G/DL (ref 3.35–5.55)
ALPHA 1 GLOB (OHS): 0.36 GM/DL (ref 0.17–0.41)
ALPHA 2 GLOB (OHS): 0.76 GM/DL (ref 0.43–0.99)
BETA GLOB (OHS): 1.08 GM/DL (ref 0.5–1.1)
GAMMA GLOBULIN (OHS): 0.66 GM/DL (ref 0.67–1.58)
KAPPA LC FREE SER-MCNC: 1.04 MG/L (ref 0.26–1.65)
KAPPA LC FREE/LAMBDA FREE SER: 2.59 MG/DL (ref 0.33–1.94)
LAMBDA LC FREE SERPL-MCNC: 2.48 MG/DL (ref 0.57–2.63)
PROT SERPL-MCNC: 6.7 GM/DL (ref 6–8.4)

## 2025-07-31 NOTE — TELEPHONE ENCOUNTER
Copied from CRM #[id number]. Topic: General Inquiry - Patient Advice  >> Jul 31, 2025  4:41 PM Sarah wrote:  Type:  Needs Medical Advice    Who Called: pt daughter   Would the patient rather a call back or a response via [facility]? Call back   Best Call Back Number: [phone]  Additional Information: pt daughter requesting a call back in regards to and call she received orders for hospice care she is confused and to know what is going on with pt health

## 2025-07-31 NOTE — TELEPHONE ENCOUNTER
Patients daughter expresses concerns regarding orders that were put in, states home health contacted them regarding Hospice care. Staff informed patients daughter that Ambulatory referral/consult to Palliative Care was ordered by HEAVENLY Perez MD. Staff informed patient that message will be sent to providers staff for explanation.       Staff also informed patient that Ambulatory Referral to Chronic Care Management  was ordered by  message will be sent to her for advise. Patients daughter states she and her mom are in a panic and would like explanation. Message sen to providers.

## 2025-08-01 LAB — STFR SERPL-MCNC: 5.7 MG/L (ref 1.8–4.6)

## 2025-08-04 ENCOUNTER — OUTPATIENT CASE MANAGEMENT (OUTPATIENT)
Dept: ADMINISTRATIVE | Facility: OTHER | Age: 80
End: 2025-08-04
Payer: MEDICARE

## 2025-08-04 LAB
PATHOLOGIST REVIEW - SPE (OHS): NORMAL
W ERYTHROPOIETIN (EPO): 28.1 MIU/ML

## 2025-08-04 NOTE — PROGRESS NOTES
Outpatient Care Management  Plan of Care Follow Up Visit    Patient: Indira Waters  MRN: [id number]  Date of Service: 08/04/2025  Completed by: Madina Malhotra RN  Referral Date: 03/31/2025    Reason for Visit   Patient presents with    OPCM RN Follow Up Call       Brief Summary:   MEMBER'S CURRENT STATUS  :  -S&S of CKD:  Patient's daughter, Esha, states that she only remembers a few signs and symptoms of CKD including not wanting much to eat, checking eyes for dark circles, and food cravings for foods she does not normally eat.  She states the patient was really weak and tired and was started on a new medication, Cymbalta, when she was in the hospital.  Esha states she was wondering if it is the medication causing this.  She received a call from Dr. Zapata today and Dr. Zapata told her that the Cymbalta can absolutely cause her to feel that way and informed Esha to stop the medication.      -Low sodium diet and fluids:  Esha states that she is now getting frozen vegetables instead of canned vegetables due to the sodium content of the canned vegetables.  She states she is eating more tomatoes, cut out potatoes but added them back in, and has been eating a lot of keto stuff.  She states she eats the [employer] bread but Keto buns when they eat burgers.  She states the plate method is when you have meat, a cup of vegetables, and a salad.  She states that the patient drinks about three 16 oz bottles of water a day just in taking medications.  She states she has the equivalent of one more bottle a day in all her meals.  It takes her a lot of water to swallow her pills due to difficulty with swallowing.     -Comorbid risk factors:  Esha states that the patient's blood pressures have been running 120//56.  She states her weight has been 195.8-197.8 without fluctuating more than a pound or two in a day.  She states the blood sugars have run  in the morning, 121-187 at lunch and   at bedtime.  She states she treated the 68 blood sugar with a banana but did not rechek it after 15 minutes.      -Palliative care:  Esha states that she got a call from someone named Cathleen with [facility] who told her that she was wanting to come over to their house to sign the patient up for hospice at which time Esha panicked, got very upset and emotional.  She states they were supposed to have Palliative to Cathleen, at which time Cathleen told her that hospice and palliative are the same thing.  She states her sister had a message from a Kelly who said he was palliative care but they were afraid to call him back for fear that it would be hospice as well.     -S&S of Anemia:  Patient's daughter, Esha, states that she does not remember the signs and symptoms of anemia at this time.    -Diet:  Esha states she is still giving the patient a diet high in iron.  She states she started including Vitamin C in her diet and her vitamins as instructed by CM on previous phone call.      INTERVENTIONS  :  -S&S of CKD:  CM reiterated the signs and symptoms of CKD that Esha did not mention including fatigue; weakness; difficulty sleeping; confusion; frequent urination, especially at night; muscle cramps at night; edema; periorbital edema, especially in the morning; nausea and vomiting; itchy skin (pruritus); weight gain or decreased urine output.     -Low sodium diet and fluids:  CM commended Esha for getting the frozen vegetables instead of canned.  CM educated the patient on the plate method in great detail informing the patient that they should fill half the plate with non starchy vegetables, 1/4 of a plate of carbohydrates, and 1/4 of a plate of protein.  CM informed the patient that the carbohydrates should be 30-45 gm per meal.  CM stressed to Esha that the patient needs consistent carbohydrates stressing the 30-45 gm per meal and 15-20 gm per snack.  CM educated Esha that she wants  to keep consistent carbohydrates to keep the patient's blood sugar from fluctuating and to try to keep it more even.  LESTER commended Esha for giving the patient three to four 16 oz bottles of water per day which is approximately 5196-5648 ml per day.  CM stressed it is important for the patient to stay well hydrated without going over the 2000 ml due to the CHF.       -Comorbid risk factors:  CM commended Esha for the good blood pressures and weights the patient has been having.  CM stressed to Esha that the patient needs to have her carbohydrates at each meal to keep her blood sugars more even and not spiking up and down.  LESTER also stressed to Esha to recheck the blood sugar 15-20 minutes after treating a low blood sugar to be sure it is coming back up.      -Palliative care:  LESTER called Cathleen [phone] from [facility] concerning the phone call the patient's daughter, Esha, received concerning signing the patient up for hospice.  Cathleen states that the phone call was simply an informative phone call and that she wanted to go over to their home to explain what hospice is and what benefits she would have with hospice.  She states she knows nothing about the patient and does not even know if she qualifies for hospice.  LESTER then called Kelly [phone] from [facility] concerning the patient.  Kelly states he left them a voicemail message and has not received a phone call from them yet.  He requested that CM have Esha call him when LESTER calls Esha back today for education.  CM instructed Esha to call Kelly when we hang up the phone.     -S&S of Anemia:  LESTER reiterated signs and symptoms of anemia including fatigue, Breathlessness, tachypnea, tachycardia, cold hands and feet, dizziness, headache, pale skin, brittle nails and craving unusual food.      -Diet:  CM commended Esha for giving high iron and for including the vitamin C to help with iron absorption.          Next  steps:   Follow up on S&S of CKD  Follow up on S&S of Anemia  Follow up on what BP and BS running  Follow up on plate method  Follow up if giving consistent carbs 30-45 gm per meal and 15-20 per snack  Follow up if call Kelly from [facility]  Follow up if patient taking losartan and torsemide

## 2025-08-07 ENCOUNTER — PROCEDURE VISIT (OUTPATIENT)
Dept: PAIN MEDICINE | Facility: CLINIC | Age: 80
End: 2025-08-07
Attending: ANESTHESIOLOGY
Payer: MEDICARE

## 2025-08-07 VITALS
TEMPERATURE: 98 F | DIASTOLIC BLOOD PRESSURE: 76 MMHG | SYSTOLIC BLOOD PRESSURE: 148 MMHG | WEIGHT: 198.19 LBS | OXYGEN SATURATION: 93 % | BODY MASS INDEX: 38.91 KG/M2 | HEIGHT: 60 IN | HEART RATE: 83 BPM | RESPIRATION RATE: 18 BRPM

## 2025-08-07 DIAGNOSIS — M62.85 DYSFUNCTION OF THE MULTIFIDUS MUSCLE OF LUMBAR REGION: ICD-10-CM

## 2025-08-07 DIAGNOSIS — M17.0 PRIMARY OSTEOARTHRITIS OF BOTH KNEES: Primary | ICD-10-CM

## 2025-08-07 PROCEDURE — 20611 DRAIN/INJ JOINT/BURSA W/US: CPT | Mod: S$PBB,50,, | Performed by: ANESTHESIOLOGY

## 2025-08-07 PROCEDURE — 20611 DRAIN/INJ JOINT/BURSA W/US: CPT | Mod: PBBFAC | Performed by: ANESTHESIOLOGY

## 2025-08-08 ENCOUNTER — DOCUMENT SCAN (OUTPATIENT)
Dept: HOME HEALTH SERVICES | Facility: HOSPITAL | Age: 80
End: 2025-08-08
Payer: MEDICARE

## 2025-08-11 ENCOUNTER — PATIENT OUTREACH (OUTPATIENT)
Dept: ADMINISTRATIVE | Facility: HOSPITAL | Age: 80
End: 2025-08-11
Payer: MEDICARE

## 2025-08-11 ENCOUNTER — PATIENT MESSAGE (OUTPATIENT)
Dept: PAIN MEDICINE | Facility: OTHER | Age: 80
End: 2025-08-11
Payer: MEDICARE

## 2025-08-11 ENCOUNTER — TELEPHONE (OUTPATIENT)
Dept: ENDOCRINOLOGY | Facility: CLINIC | Age: 80
End: 2025-08-11
Payer: MEDICARE

## 2025-08-11 ENCOUNTER — TELEPHONE (OUTPATIENT)
Dept: INTERNAL MEDICINE | Facility: CLINIC | Age: 80
End: 2025-08-11
Payer: MEDICARE

## 2025-08-11 DIAGNOSIS — M62.85 DYSFUNCTION OF THE MULTIFIDUS MUSCLE OF LUMBAR REGION: Primary | ICD-10-CM

## 2025-08-11 DIAGNOSIS — T85.111A: ICD-10-CM

## 2025-08-11 DIAGNOSIS — E11.9 TYPE 2 DIABETES MELLITUS WITHOUT COMPLICATION, UNSPECIFIED WHETHER LONG TERM INSULIN USE: Primary | ICD-10-CM

## 2025-08-12 ENCOUNTER — PATIENT MESSAGE (OUTPATIENT)
Dept: ENDOCRINOLOGY | Facility: CLINIC | Age: 80
End: 2025-08-12
Payer: MEDICARE

## 2025-08-18 ENCOUNTER — OUTPATIENT CASE MANAGEMENT (OUTPATIENT)
Dept: ADMINISTRATIVE | Facility: OTHER | Age: 80
End: 2025-08-18
Payer: MEDICARE

## 2025-08-20 ENCOUNTER — OFFICE VISIT (OUTPATIENT)
Dept: OPHTHALMOLOGY | Facility: CLINIC | Age: 80
End: 2025-08-20
Payer: MEDICARE

## 2025-08-20 DIAGNOSIS — H25.13 NUCLEAR SCLEROSIS, BILATERAL: Primary | ICD-10-CM

## 2025-08-20 PROCEDURE — 99214 OFFICE O/P EST MOD 30 MIN: CPT | Mod: PBBFAC | Performed by: OPHTHALMOLOGY

## 2025-08-20 PROCEDURE — 92136 OPHTHALMIC BIOMETRY: CPT | Mod: PBBFAC | Performed by: OPHTHALMOLOGY

## 2025-08-20 PROCEDURE — 99999 PR PBB SHADOW E&M-EST. PATIENT-LVL IV: CPT | Mod: PBBFAC,,, | Performed by: OPHTHALMOLOGY

## 2025-08-20 RX ORDER — MOXIFLOXACIN 5 MG/ML
1 SOLUTION/ DROPS OPHTHALMIC
OUTPATIENT
Start: 2025-08-20 | End: 2025-08-20

## 2025-08-20 RX ORDER — FENTANYL CITRATE 50 UG/ML
25 INJECTION, SOLUTION INTRAMUSCULAR; INTRAVENOUS
Refills: 0 | OUTPATIENT
Start: 2025-08-20

## 2025-08-20 RX ORDER — CYCLOP/TROP/PROPA/PHEN/KET/WAT 1-1-0.1%
1 DROPS (EA) OPHTHALMIC (EYE)
OUTPATIENT
Start: 2025-08-20 | End: 2025-08-20

## 2025-08-20 RX ORDER — PREDNISOLONE ACETATE-GATIFLOXACIN-BROMFENAC .75; 5; 1 MG/ML; MG/ML; MG/ML
1 SUSPENSION/ DROPS OPHTHALMIC 3 TIMES DAILY
Qty: 8 ML | Refills: 3 | Status: SHIPPED | OUTPATIENT
Start: 2025-08-20

## 2025-08-20 RX ORDER — PHENYLEPHRINE HYDROCHLORIDE 100 MG/ML
1 SOLUTION/ DROPS OPHTHALMIC
OUTPATIENT
Start: 2025-08-20

## 2025-08-20 RX ORDER — MIDAZOLAM HYDROCHLORIDE 1 MG/ML
1 INJECTION, SOLUTION INTRAMUSCULAR; INTRAVENOUS
OUTPATIENT
Start: 2025-08-20

## 2025-08-21 ENCOUNTER — OFFICE VISIT (OUTPATIENT)
Dept: INTERNAL MEDICINE | Facility: CLINIC | Age: 80
End: 2025-08-21
Payer: MEDICARE

## 2025-08-21 VITALS
OXYGEN SATURATION: 94 % | HEIGHT: 60 IN | BODY MASS INDEX: 39.05 KG/M2 | SYSTOLIC BLOOD PRESSURE: 176 MMHG | HEART RATE: 75 BPM | DIASTOLIC BLOOD PRESSURE: 80 MMHG | WEIGHT: 198.88 LBS

## 2025-08-21 DIAGNOSIS — E55.9 VITAMIN D DEFICIENCY: ICD-10-CM

## 2025-08-21 DIAGNOSIS — E11.22 TYPE 2 DIABETES MELLITUS WITH CHRONIC KIDNEY DISEASE, WITH LONG-TERM CURRENT USE OF INSULIN, UNSPECIFIED CKD STAGE: ICD-10-CM

## 2025-08-21 DIAGNOSIS — G89.4 CHRONIC PAIN DISORDER: ICD-10-CM

## 2025-08-21 DIAGNOSIS — F33.40 RECURRENT MAJOR DEPRESSIVE DISORDER, IN REMISSION: ICD-10-CM

## 2025-08-21 DIAGNOSIS — I69.354 HEMIPLEGIA AND HEMIPARESIS FOLLOWING CEREBRAL INFARCTION AFFECTING LEFT NON-DOMINANT SIDE: ICD-10-CM

## 2025-08-21 DIAGNOSIS — I25.10 ATHSCL HEART DISEASE OF NATIVE CORONARY ARTERY W/O ANG PCTRS: ICD-10-CM

## 2025-08-21 DIAGNOSIS — I10 ESSENTIAL HYPERTENSION: Primary | Chronic | ICD-10-CM

## 2025-08-21 DIAGNOSIS — I50.42 CHRONIC COMBINED SYSTOLIC AND DIASTOLIC CONGESTIVE HEART FAILURE: ICD-10-CM

## 2025-08-21 DIAGNOSIS — I13.0 HYPERTENSIVE HEART AND CHRONIC KIDNEY DISEASE WITH HEART FAILURE AND STAGE 1 THROUGH STAGE 4 CHRONIC KIDNEY DISEASE, OR UNSPECIFIED CHRONIC KIDNEY DISEASE: ICD-10-CM

## 2025-08-21 DIAGNOSIS — J96.11 CHRONIC RESPIRATORY FAILURE WITH HYPOXIA: ICD-10-CM

## 2025-08-21 DIAGNOSIS — Z86.73 HISTORY OF CVA (CEREBROVASCULAR ACCIDENT): ICD-10-CM

## 2025-08-21 DIAGNOSIS — Z79.4 TYPE 2 DIABETES MELLITUS WITH CHRONIC KIDNEY DISEASE, WITH LONG-TERM CURRENT USE OF INSULIN, UNSPECIFIED CKD STAGE: ICD-10-CM

## 2025-08-21 DIAGNOSIS — I50.43 ACUTE ON CHRONIC COMBINED SYSTOLIC AND DIASTOLIC CONGESTIVE HEART FAILURE: ICD-10-CM

## 2025-08-21 DIAGNOSIS — Z99.81 CHRONIC RESPIRATORY FAILURE WITH HYPOXIA, ON HOME O2 THERAPY: ICD-10-CM

## 2025-08-21 DIAGNOSIS — N18.31 STAGE 3A CHRONIC KIDNEY DISEASE: ICD-10-CM

## 2025-08-21 DIAGNOSIS — Z99.81 ON HOME OXYGEN THERAPY: ICD-10-CM

## 2025-08-21 DIAGNOSIS — Z91.199 NONCOMPLIANCE: ICD-10-CM

## 2025-08-21 DIAGNOSIS — G89.4 CHRONIC PAIN SYNDROME: ICD-10-CM

## 2025-08-21 DIAGNOSIS — E78.01 FAMILIAL HYPERCHOLESTEROLEMIA: Chronic | ICD-10-CM

## 2025-08-21 DIAGNOSIS — E08.42 DIABETIC POLYNEUROPATHY ASSOCIATED WITH DIABETES MELLITUS DUE TO UNDERLYING CONDITION: ICD-10-CM

## 2025-08-21 DIAGNOSIS — F32.A DEPRESSION, UNSPECIFIED DEPRESSION TYPE: ICD-10-CM

## 2025-08-21 DIAGNOSIS — J96.11 CHRONIC RESPIRATORY FAILURE WITH HYPOXIA, ON HOME O2 THERAPY: ICD-10-CM

## 2025-08-21 DIAGNOSIS — J44.9 CHRONIC OBSTRUCTIVE PULMONARY DISEASE, UNSPECIFIED COPD TYPE: ICD-10-CM

## 2025-08-21 DIAGNOSIS — R53.1 GENERAL WEAKNESS: ICD-10-CM

## 2025-08-21 PROBLEM — D69.0 IGA MEDIATED LEUKOCYTOCLASTIC VASCULITIS: Status: RESOLVED | Noted: 2023-10-26 | Resolved: 2025-08-21

## 2025-08-21 PROCEDURE — 99999 PR PBB SHADOW E&M-EST. PATIENT-LVL V: CPT | Mod: PBBFAC,,, | Performed by: STUDENT IN AN ORGANIZED HEALTH CARE EDUCATION/TRAINING PROGRAM

## 2025-08-21 PROCEDURE — 99215 OFFICE O/P EST HI 40 MIN: CPT | Mod: PBBFAC | Performed by: STUDENT IN AN ORGANIZED HEALTH CARE EDUCATION/TRAINING PROGRAM

## 2025-08-21 RX ORDER — TORSEMIDE 20 MG/1
20 TABLET ORAL DAILY PRN
Qty: 90 TABLET | Refills: 3 | Status: SHIPPED | OUTPATIENT
Start: 2025-08-21

## 2025-08-21 RX ORDER — METHOCARBAMOL 500 MG/1
500 TABLET, FILM COATED ORAL 3 TIMES DAILY
Qty: 90 TABLET | Refills: 2 | Status: SHIPPED | OUTPATIENT
Start: 2025-08-21

## 2025-08-27 ENCOUNTER — TELEPHONE (OUTPATIENT)
Dept: PAIN MEDICINE | Facility: CLINIC | Age: 80
End: 2025-08-27
Payer: MEDICARE

## 2025-08-27 DIAGNOSIS — E11.40 PAINFUL DIABETIC NEUROPATHY: ICD-10-CM

## 2025-08-27 DIAGNOSIS — M17.0 PRIMARY OSTEOARTHRITIS OF BOTH KNEES: ICD-10-CM

## 2025-08-27 DIAGNOSIS — G89.4 CHRONIC PAIN SYNDROME: ICD-10-CM

## 2025-08-27 RX ORDER — PREGABALIN 150 MG/1
150 CAPSULE ORAL 2 TIMES DAILY
Qty: 60 CAPSULE | Refills: 5 | Status: SHIPPED | OUTPATIENT
Start: 2025-08-27 | End: 2026-02-23

## 2025-08-28 ENCOUNTER — LAB VISIT (OUTPATIENT)
Dept: LAB | Facility: OTHER | Age: 80
End: 2025-08-28
Attending: STUDENT IN AN ORGANIZED HEALTH CARE EDUCATION/TRAINING PROGRAM
Payer: MEDICARE

## 2025-08-28 DIAGNOSIS — N18.31 STAGE 3A CHRONIC KIDNEY DISEASE: ICD-10-CM

## 2025-08-28 DIAGNOSIS — E78.01 FAMILIAL HYPERCHOLESTEROLEMIA: Chronic | ICD-10-CM

## 2025-08-28 DIAGNOSIS — Z79.4 TYPE 2 DIABETES MELLITUS WITH CHRONIC KIDNEY DISEASE, WITH LONG-TERM CURRENT USE OF INSULIN, UNSPECIFIED CKD STAGE: ICD-10-CM

## 2025-08-28 DIAGNOSIS — I10 ESSENTIAL HYPERTENSION: Chronic | ICD-10-CM

## 2025-08-28 DIAGNOSIS — E11.22 TYPE 2 DIABETES MELLITUS WITH CHRONIC KIDNEY DISEASE, WITH LONG-TERM CURRENT USE OF INSULIN, UNSPECIFIED CKD STAGE: ICD-10-CM

## 2025-08-28 LAB
25(OH)D3+25(OH)D2 SERPL-MCNC: 76 NG/ML (ref 30–96)
ALBUMIN SERPL BCP-MCNC: 3.9 G/DL (ref 3.5–5.2)
ALP SERPL-CCNC: 58 UNIT/L (ref 40–150)
ALT SERPL W/O P-5'-P-CCNC: 9 UNIT/L (ref 10–44)
ANION GAP (OHS): 11 MMOL/L (ref 8–16)
AST SERPL-CCNC: 26 UNIT/L (ref 11–45)
BILIRUB SERPL-MCNC: 0.3 MG/DL (ref 0.1–1)
BUN SERPL-MCNC: 25 MG/DL (ref 8–23)
CALCIUM SERPL-MCNC: 9.2 MG/DL (ref 8.7–10.5)
CHLORIDE SERPL-SCNC: 105 MMOL/L (ref 95–110)
CHOLEST SERPL-MCNC: 166 MG/DL (ref 120–199)
CHOLEST/HDLC SERPL: 4.2 {RATIO} (ref 2–5)
CO2 SERPL-SCNC: 25 MMOL/L (ref 23–29)
CREAT SERPL-MCNC: 1.4 MG/DL (ref 0.5–1.4)
EAG (OHS): 160 MG/DL (ref 68–131)
ERYTHROCYTE [DISTWIDTH] IN BLOOD BY AUTOMATED COUNT: 17 % (ref 11.5–14.5)
GFR SERPLBLD CREATININE-BSD FMLA CKD-EPI: 38 ML/MIN/1.73/M2
GLUCOSE SERPL-MCNC: 191 MG/DL (ref 70–110)
HBA1C MFR BLD: 7.2 % (ref 4–5.6)
HCT VFR BLD AUTO: 33.7 % (ref 37–48.5)
HDLC SERPL-MCNC: 40 MG/DL (ref 40–75)
HDLC SERPL: 24.1 % (ref 20–50)
HGB BLD-MCNC: 10.8 GM/DL (ref 12–16)
LDLC SERPL CALC-MCNC: 85.2 MG/DL (ref 63–159)
MAGNESIUM SERPL-MCNC: 2 MG/DL (ref 1.6–2.6)
MCH RBC QN AUTO: 28.2 PG (ref 27–31)
MCHC RBC AUTO-ENTMCNC: 32 G/DL (ref 32–36)
MCV RBC AUTO: 88 FL (ref 82–98)
NONHDLC SERPL-MCNC: 126 MG/DL
PHOSPHATE SERPL-MCNC: 3.1 MG/DL (ref 2.7–4.5)
PLATELET # BLD AUTO: 346 K/UL (ref 150–450)
PMV BLD AUTO: 12 FL (ref 9.2–12.9)
POTASSIUM SERPL-SCNC: 4.6 MMOL/L (ref 3.5–5.1)
PROT SERPL-MCNC: 7 GM/DL (ref 6–8.4)
RBC # BLD AUTO: 3.83 M/UL (ref 4–5.4)
SODIUM SERPL-SCNC: 141 MMOL/L (ref 136–145)
TRIGL SERPL-MCNC: 204 MG/DL (ref 30–150)
WBC # BLD AUTO: 14.09 K/UL (ref 3.9–12.7)

## 2025-08-28 PROCEDURE — 83036 HEMOGLOBIN GLYCOSYLATED A1C: CPT

## 2025-08-28 PROCEDURE — 84100 ASSAY OF PHOSPHORUS: CPT

## 2025-08-28 PROCEDURE — 82465 ASSAY BLD/SERUM CHOLESTEROL: CPT

## 2025-08-28 PROCEDURE — 36415 COLL VENOUS BLD VENIPUNCTURE: CPT

## 2025-08-28 PROCEDURE — 85027 COMPLETE CBC AUTOMATED: CPT

## 2025-08-28 PROCEDURE — 83735 ASSAY OF MAGNESIUM: CPT

## 2025-08-28 PROCEDURE — 82306 VITAMIN D 25 HYDROXY: CPT

## 2025-08-28 PROCEDURE — 82435 ASSAY OF BLOOD CHLORIDE: CPT

## 2025-08-30 DIAGNOSIS — E79.0 HYPERURICEMIA: ICD-10-CM

## 2025-08-31 RX ORDER — ALLOPURINOL 300 MG/1
300 TABLET ORAL DAILY
Qty: 90 TABLET | Refills: 3 | Status: SHIPPED | OUTPATIENT
Start: 2025-08-31

## 2025-09-01 DIAGNOSIS — M17.0 PRIMARY OSTEOARTHRITIS OF BOTH KNEES: ICD-10-CM

## 2025-09-01 DIAGNOSIS — M54.16 LUMBAR RADICULOPATHY, CHRONIC: ICD-10-CM

## 2025-09-01 DIAGNOSIS — G89.4 CHRONIC PAIN DISORDER: Primary | ICD-10-CM

## 2025-09-01 DIAGNOSIS — E11.40 PAINFUL DIABETIC NEUROPATHY: ICD-10-CM

## 2025-09-02 ENCOUNTER — OUTPATIENT CASE MANAGEMENT (OUTPATIENT)
Dept: ADMINISTRATIVE | Facility: OTHER | Age: 80
End: 2025-09-02
Payer: MEDICARE

## 2025-09-02 RX ORDER — HYDROCODONE BITARTRATE AND ACETAMINOPHEN 10; 325 MG/1; MG/1
1 TABLET ORAL EVERY 8 HOURS PRN
Qty: 90 TABLET | Refills: 0 | Status: SHIPPED | OUTPATIENT
Start: 2025-09-02 | End: 2025-10-02

## 2025-09-03 ENCOUNTER — TELEPHONE (OUTPATIENT)
Dept: INTERNAL MEDICINE | Facility: CLINIC | Age: 80
End: 2025-09-03
Payer: MEDICARE

## 2025-09-03 ENCOUNTER — HOSPITAL ENCOUNTER (OUTPATIENT)
Dept: PREADMISSION TESTING | Facility: OTHER | Age: 80
Discharge: HOME | End: 2025-09-03
Attending: ANESTHESIOLOGY
Payer: MEDICARE

## 2025-09-03 ENCOUNTER — E-CONSULT (OUTPATIENT)
Dept: CARDIOLOGY | Facility: CLINIC | Age: 80
End: 2025-09-03
Payer: MEDICARE

## 2025-09-03 VITALS
DIASTOLIC BLOOD PRESSURE: 71 MMHG | TEMPERATURE: 97 F | BODY MASS INDEX: 39.27 KG/M2 | HEART RATE: 71 BPM | SYSTOLIC BLOOD PRESSURE: 158 MMHG | WEIGHT: 200 LBS | HEIGHT: 60 IN | OXYGEN SATURATION: 95 %

## 2025-09-03 DIAGNOSIS — I25.10 CORONARY ARTERY CALCIFICATION: Primary | Chronic | ICD-10-CM

## 2025-09-03 DIAGNOSIS — Z01.818 PREOP EXAMINATION: ICD-10-CM

## 2025-09-03 DIAGNOSIS — Z01.818 PREOP TESTING: Primary | ICD-10-CM

## 2025-09-03 DIAGNOSIS — I25.118 ATHEROSCLEROSIS OF NATIVE CORONARY ARTERY OF NATIVE HEART WITH STABLE ANGINA PECTORIS: ICD-10-CM

## 2025-09-03 DIAGNOSIS — I50.42 CHRONIC COMBINED SYSTOLIC AND DIASTOLIC HEART FAILURE: ICD-10-CM

## 2025-09-03 DIAGNOSIS — I25.10 ATHSCL HEART DISEASE OF NATIVE CORONARY ARTERY W/O ANG PCTRS: ICD-10-CM

## 2025-09-03 DIAGNOSIS — I50.22 CHRONIC SYSTOLIC (CONGESTIVE) HEART FAILURE: ICD-10-CM

## 2025-09-03 DIAGNOSIS — Z01.810 PREOPERATIVE CARDIOVASCULAR EXAMINATION: Primary | ICD-10-CM

## 2025-09-03 DIAGNOSIS — I25.118 CORONARY ARTERY DISEASE OF NATIVE ARTERY OF NATIVE HEART WITH STABLE ANGINA PECTORIS: ICD-10-CM

## 2025-09-03 LAB
ABSOLUTE EOSINOPHIL (OHS): 0.5 K/UL
ABSOLUTE MONOCYTE (OHS): 0.99 K/UL (ref 0.3–1)
ABSOLUTE NEUTROPHIL COUNT (OHS): 8.3 K/UL (ref 1.8–7.7)
ANION GAP (OHS): 9 MMOL/L (ref 8–16)
BASOPHILS # BLD AUTO: 0.06 K/UL
BASOPHILS NFR BLD AUTO: 0.5 %
BUN SERPL-MCNC: 21 MG/DL (ref 8–23)
CALCIUM SERPL-MCNC: 9.3 MG/DL (ref 8.7–10.5)
CHLORIDE SERPL-SCNC: 106 MMOL/L (ref 95–110)
CO2 SERPL-SCNC: 24 MMOL/L (ref 23–29)
CREAT SERPL-MCNC: 1.2 MG/DL (ref 0.5–1.4)
ERYTHROCYTE [DISTWIDTH] IN BLOOD BY AUTOMATED COUNT: 17.2 % (ref 11.5–14.5)
GFR SERPLBLD CREATININE-BSD FMLA CKD-EPI: 46 ML/MIN/1.73/M2
GLUCOSE SERPL-MCNC: 132 MG/DL (ref 70–110)
HCT VFR BLD AUTO: 34.5 % (ref 37–48.5)
HGB BLD-MCNC: 10.8 GM/DL (ref 12–16)
IMM GRANULOCYTES # BLD AUTO: 0.06 K/UL (ref 0–0.04)
IMM GRANULOCYTES NFR BLD AUTO: 0.5 % (ref 0–0.5)
LYMPHOCYTES # BLD AUTO: 2.48 K/UL (ref 1–4.8)
MCH RBC QN AUTO: 27.7 PG (ref 27–31)
MCHC RBC AUTO-ENTMCNC: 31.3 G/DL (ref 32–36)
MCV RBC AUTO: 89 FL (ref 82–98)
NUCLEATED RBC (/100WBC) (OHS): 0 /100 WBC
PLATELET # BLD AUTO: 298 K/UL (ref 150–450)
PMV BLD AUTO: 11.6 FL (ref 9.2–12.9)
POTASSIUM SERPL-SCNC: 5.6 MMOL/L (ref 3.5–5.1)
RBC # BLD AUTO: 3.9 M/UL (ref 4–5.4)
RELATIVE EOSINOPHIL (OHS): 4 %
RELATIVE LYMPHOCYTE (OHS): 20 % (ref 18–48)
RELATIVE MONOCYTE (OHS): 8 % (ref 4–15)
RELATIVE NEUTROPHIL (OHS): 67 % (ref 38–73)
SODIUM SERPL-SCNC: 139 MMOL/L (ref 136–145)
WBC # BLD AUTO: 12.39 K/UL (ref 3.9–12.7)

## 2025-09-03 PROCEDURE — 80048 BASIC METABOLIC PNL TOTAL CA: CPT

## 2025-09-03 PROCEDURE — 85025 COMPLETE CBC W/AUTO DIFF WBC: CPT

## 2025-09-03 RX ORDER — SODIUM CHLORIDE, SODIUM LACTATE, POTASSIUM CHLORIDE, CALCIUM CHLORIDE 600; 310; 30; 20 MG/100ML; MG/100ML; MG/100ML; MG/100ML
INJECTION, SOLUTION INTRAVENOUS CONTINUOUS
OUTPATIENT
Start: 2025-09-03

## 2025-09-03 RX ORDER — FAMOTIDINE 20 MG/1
20 TABLET, FILM COATED ORAL
OUTPATIENT
Start: 2025-09-03 | End: 2025-09-03

## 2025-09-03 RX ORDER — ALBUTEROL SULFATE 2.5 MG/.5ML
2.5 SOLUTION RESPIRATORY (INHALATION)
OUTPATIENT
Start: 2025-09-03 | End: 2025-09-03

## 2025-09-03 RX ORDER — LIDOCAINE HYDROCHLORIDE 10 MG/ML
0.5 INJECTION, SOLUTION EPIDURAL; INFILTRATION; INTRACAUDAL; PERINEURAL ONCE
OUTPATIENT
Start: 2025-09-03 | End: 2025-09-03

## 2025-09-04 ENCOUNTER — TELEPHONE (OUTPATIENT)
Dept: PULMONOLOGY | Facility: CLINIC | Age: 80
End: 2025-09-04
Payer: MEDICARE

## 2025-09-04 DIAGNOSIS — J44.9 CHRONIC OBSTRUCTIVE PULMONARY DISEASE, UNSPECIFIED COPD TYPE: Primary | ICD-10-CM

## 2025-09-05 ENCOUNTER — TELEPHONE (OUTPATIENT)
Dept: OPHTHALMOLOGY | Facility: CLINIC | Age: 80
End: 2025-09-05
Payer: MEDICARE

## 2025-09-05 DIAGNOSIS — H25.12 NUCLEAR SCLEROTIC CATARACT OF LEFT EYE: Primary | ICD-10-CM

## (undated) DEVICE — TRAY CORONARY CUSTOM BAPTIST

## (undated) DEVICE — SYR CNTRL MALE LL 10ML

## (undated) DEVICE — DRESSING TRANS 8X12 TEGADERM

## (undated) DEVICE — GLIDESHEATH SLENDER SS 5FR10CM

## (undated) DEVICE — CONTRAST OMNIPAQUE 300 100ML

## (undated) DEVICE — CATH INFINITI JUDKINS JR4

## (undated) DEVICE — BAG SNAP KOVER BAND 36 X 28 IN

## (undated) DEVICE — CATH OPTITORQUE RADIAL 5FR

## (undated) DEVICE — GLOVE BIOGEL SKINSENSE PI 7.0

## (undated) DEVICE — GLOVE BIOGEL SKINSENSE PI 7.5

## (undated) DEVICE — CATH INFINITI 4F JL4 .042X100

## (undated) DEVICE — GUIDEWIRE ANGIO 1.5MM .035X180

## (undated) DEVICE — Device

## (undated) DEVICE — DRAPE RAD/FEM ANGIO 80X135IN

## (undated) DEVICE — SET MICPUNC ACC STIFF CANNULA

## (undated) DEVICE — BAG DRAINAGE W/SPIKE

## (undated) DEVICE — KIT PROBE COVER WITH GEL

## (undated) DEVICE — APPLICATOR CHLORAPREP CLR 10.5

## (undated) DEVICE — MANIFOLD PERCEPTOR MP 3P RH ON

## (undated) DEVICE — WIRE GUIDE J .035 X 180CM

## (undated) DEVICE — DRESSING LEUKOPLAST FLEX 1X3IN

## (undated) DEVICE — HEMOSTAT VASC BAND REG 24CM

## (undated) DEVICE — INTRODUCER CATH 4F 11CM